# Patient Record
Sex: FEMALE | Race: WHITE | NOT HISPANIC OR LATINO | Employment: OTHER | URBAN - METROPOLITAN AREA
[De-identification: names, ages, dates, MRNs, and addresses within clinical notes are randomized per-mention and may not be internally consistent; named-entity substitution may affect disease eponyms.]

---

## 2018-02-19 ENCOUNTER — OFFICE VISIT (OUTPATIENT)
Dept: PRIMARY CARE CLINIC | Facility: CLINIC | Age: 63
End: 2018-02-19
Payer: MEDICARE

## 2018-02-19 VITALS
HEIGHT: 63 IN | SYSTOLIC BLOOD PRESSURE: 112 MMHG | RESPIRATION RATE: 18 BRPM | BODY MASS INDEX: 26.96 KG/M2 | DIASTOLIC BLOOD PRESSURE: 76 MMHG | HEART RATE: 71 BPM | TEMPERATURE: 99 F | OXYGEN SATURATION: 95 % | WEIGHT: 152.19 LBS

## 2018-02-19 DIAGNOSIS — R10.32 ABDOMINAL PAIN, LLQ (LEFT LOWER QUADRANT): Primary | ICD-10-CM

## 2018-02-19 DIAGNOSIS — M06.9 RHEUMATOID ARTHRITIS, INVOLVING UNSPECIFIED SITE, UNSPECIFIED RHEUMATOID FACTOR PRESENCE: ICD-10-CM

## 2018-02-19 DIAGNOSIS — F41.9 ANXIETY: ICD-10-CM

## 2018-02-19 DIAGNOSIS — I50.9 CONGESTIVE HEART FAILURE, UNSPECIFIED CONGESTIVE HEART FAILURE CHRONICITY, UNSPECIFIED CONGESTIVE HEART FAILURE TYPE: ICD-10-CM

## 2018-02-19 DIAGNOSIS — Z95.0 PACEMAKER: ICD-10-CM

## 2018-02-19 DIAGNOSIS — Z12.31 ENCOUNTER FOR SCREENING MAMMOGRAM FOR BREAST CANCER: ICD-10-CM

## 2018-02-19 PROCEDURE — 99213 OFFICE O/P EST LOW 20 MIN: CPT | Mod: S$PBB,,, | Performed by: INTERNAL MEDICINE

## 2018-02-19 PROCEDURE — 99999 PR PBB SHADOW E&M-NEW PATIENT-LVL III: CPT | Mod: PBBFAC,,, | Performed by: INTERNAL MEDICINE

## 2018-02-19 PROCEDURE — 99203 OFFICE O/P NEW LOW 30 MIN: CPT | Mod: PBBFAC,PN | Performed by: INTERNAL MEDICINE

## 2018-02-19 RX ORDER — CARVEDILOL 12.5 MG/1
12.5 TABLET ORAL 2 TIMES DAILY
Qty: 180 TABLET | Refills: 3 | Status: SHIPPED | OUTPATIENT
Start: 2018-02-19 | End: 2018-09-07 | Stop reason: SDUPTHER

## 2018-02-19 RX ORDER — ALPRAZOLAM 0.5 MG/1
0.5 TABLET ORAL NIGHTLY PRN
COMMUNITY
Start: 2017-12-11 | End: 2018-02-19 | Stop reason: SDUPTHER

## 2018-02-19 RX ORDER — CARVEDILOL 12.5 MG/1
12.5 TABLET ORAL 2 TIMES DAILY
COMMUNITY
Start: 2017-12-11 | End: 2018-02-19 | Stop reason: SDUPTHER

## 2018-02-19 RX ORDER — CEFIXIME 400 MG/1
400 CAPSULE ORAL 2 TIMES DAILY
Qty: 20 CAPSULE | Refills: 1 | Status: SHIPPED | OUTPATIENT
Start: 2018-02-19 | End: 2018-02-20

## 2018-02-19 RX ORDER — CITALOPRAM 20 MG/1
20 TABLET, FILM COATED ORAL DAILY
Qty: 90 TABLET | Refills: 3 | Status: SHIPPED | OUTPATIENT
Start: 2018-02-19 | End: 2018-09-07 | Stop reason: SDUPTHER

## 2018-02-19 RX ORDER — METRONIDAZOLE 500 MG/1
500 TABLET ORAL EVERY 8 HOURS
Qty: 30 TABLET | Refills: 1 | Status: SHIPPED | OUTPATIENT
Start: 2018-02-19 | End: 2018-03-01

## 2018-02-19 RX ORDER — CITALOPRAM 20 MG/1
20 TABLET, FILM COATED ORAL DAILY
COMMUNITY
Start: 2017-12-11 | End: 2018-02-19 | Stop reason: SDUPTHER

## 2018-02-19 RX ORDER — CARVEDILOL 12.5 MG/1
12.5 TABLET ORAL 2 TIMES DAILY
Qty: 180 TABLET | Refills: 3 | Status: SHIPPED | OUTPATIENT
Start: 2018-02-19 | End: 2018-02-19 | Stop reason: SDUPTHER

## 2018-02-19 RX ORDER — ALPRAZOLAM 0.5 MG/1
0.5 TABLET ORAL NIGHTLY PRN
Qty: 30 TABLET | Refills: 0 | Status: SHIPPED | OUTPATIENT
Start: 2018-02-19 | End: 2018-04-11 | Stop reason: SDUPTHER

## 2018-02-20 RX ORDER — CIPROFLOXACIN 500 MG/1
500 TABLET ORAL 2 TIMES DAILY
Qty: 20 TABLET | Refills: 0 | Status: SHIPPED | OUTPATIENT
Start: 2018-02-20 | End: 2018-04-11

## 2018-02-20 NOTE — PROGRESS NOTES
Subjective:       Patient ID: Odette Hart is a 62 y.o. female.    Chief Complaint: Diverticulitis and Routine Check Up    HPI  Pt with h/o diverticuloses by colonscopy c/o LLQ pain few days no fever chill no n/v/d no sob cp  No blo din stool pain not better pt had MI 2012 had stnets need f/u with cardiology has pacemaker pt refuse sReview of Systems    Objective:      Physical Exam   Constitutional: She is oriented to person, place, and time. She appears well-developed and well-nourished. No distress.   HENT:   Head: Normocephalic and atraumatic.   Right Ear: External ear normal.   Left Ear: External ear normal.   Nose: Nose normal.   Mouth/Throat: Oropharynx is clear and moist. No oropharyngeal exudate.   Eyes: Conjunctivae and EOM are normal. Pupils are equal, round, and reactive to light. Right eye exhibits no discharge. Left eye exhibits no discharge.   Neck: Normal range of motion. Neck supple. No thyromegaly present.   Cardiovascular: Normal rate, regular rhythm, normal heart sounds and intact distal pulses.  Exam reveals no gallop and no friction rub.    No murmur heard.  Pulmonary/Chest: Effort normal and breath sounds normal. No respiratory distress. She has no wheezes. She has no rales. She exhibits no tenderness.   Abdominal: Soft. Bowel sounds are normal. She exhibits no distension. There is tenderness (tenderness LLQ with plapation no guarding rebounced). There is no rebound and no guarding.   Musculoskeletal: Normal range of motion. She exhibits no edema, tenderness or deformity.   Lymphadenopathy:     She has no cervical adenopathy.   Neurological: She is alert and oriented to person, place, and time.   Skin: Skin is warm and dry. Capillary refill takes less than 2 seconds. No rash noted. No erythema.   Psychiatric: She has a normal mood and affect. Judgment and thought content normal.   Nursing note and vitals reviewed.      Assessment:       1. Abdominal pain, LLQ (left lower quadrant)    2.  Congestive heart failure, unspecified congestive heart failure chronicity, unspecified congestive heart failure type    3. Pacemaker    4. Anxiety    5. Rheumatoid arthritis, involving unspecified site, unspecified rheumatoid factor presence    6. Encounter for screening mammogram for breast cancer        Plan:       Abdominal pain, LLQ (left lower quadrant)  Comments:  CT scan abd ppelvic if not better in 2 days  Orders:  -     metroNIDAZOLE (FLAGYL) 500 MG tablet; Take 1 tablet (500 mg total) by mouth every 8 (eight) hours.  Dispense: 30 tablet; Refill: 1  -     cefixime 400 mg Cap; Take 400 mg by mouth 2 (two) times daily.  Dispense: 20 capsule; Refill: 1  -     CBC auto differential; Future; Expected date: 02/20/2018  -     Comprehensive metabolic panel; Future; Expected date: 02/20/2018  -     POCT URINE DIPSTICK WITHOUT MICROSCOPE; Future; Expected date: 02/20/2018    Congestive heart failure, unspecified congestive heart failure chronicity, unspecified congestive heart failure type  Comments:  stress echo does not want any IV meds for stress test  Orders:  -     Discontinue: carvedilol (COREG) 12.5 MG tablet; Take 1 tablet (12.5 mg total) by mouth 2 (two) times daily.  Dispense: 180 tablet; Refill: 3  -     carvedilol (COREG) 12.5 MG tablet; Take 1 tablet (12.5 mg total) by mouth 2 (two) times daily.  Dispense: 180 tablet; Refill: 3  -     Lipid panel; Future; Expected date: 02/20/2018    Pacemaker  -     X-Ray Chest PA And Lateral; Future; Expected date: 02/20/2018  -     POCT EKG 12-LEAD (NOT FOR [facility] USE); Future; Expected date: 02/20/2018    Anxiety  -     citalopram (CELEXA) 20 MG tablet; Take 1 tablet (20 mg total) by mouth once daily.  Dispense: 90 tablet; Refill: 3  -     ALPRAZolam (XANAX) 0.5 MG tablet; Take 1 tablet (0.5 mg total) by mouth nightly as needed.  Dispense: 30 tablet; Refill: 0    Rheumatoid arthritis, involving unspecified site, unspecified rheumatoid factor presence  -      Rheumatoid factor; Future; Expected date: 02/21/2018    Encounter for screening mammogram for breast cancer  Comments:  pt currently refuse mammogram

## 2018-02-21 ENCOUNTER — CLINICAL SUPPORT (OUTPATIENT)
Dept: PRIMARY CARE CLINIC | Facility: CLINIC | Age: 63
End: 2018-02-21
Payer: MEDICARE

## 2018-02-21 DIAGNOSIS — E53.8 B12 DEFICIENCY: ICD-10-CM

## 2018-02-21 DIAGNOSIS — M06.9 RHEUMATOID ARTHRITIS, INVOLVING UNSPECIFIED SITE, UNSPECIFIED RHEUMATOID FACTOR PRESENCE: ICD-10-CM

## 2018-02-21 DIAGNOSIS — E55.9 VITAMIN D DEFICIENCY: Primary | ICD-10-CM

## 2018-02-21 DIAGNOSIS — R10.32 ABDOMINAL PAIN, LLQ (LEFT LOWER QUADRANT): ICD-10-CM

## 2018-02-21 DIAGNOSIS — I50.9 CONGESTIVE HEART FAILURE, UNSPECIFIED CONGESTIVE HEART FAILURE CHRONICITY, UNSPECIFIED CONGESTIVE HEART FAILURE TYPE: ICD-10-CM

## 2018-02-21 LAB
25(OH)D3+25(OH)D2 SERPL-MCNC: 31 NG/ML
ALBUMIN SERPL BCP-MCNC: 3.2 G/DL
ALP SERPL-CCNC: 88 U/L
ALT SERPL W/O P-5'-P-CCNC: 19 U/L
ANION GAP SERPL CALC-SCNC: 8 MMOL/L
AST SERPL-CCNC: 18 U/L
BASOPHILS # BLD AUTO: 0.02 K/UL
BASOPHILS NFR BLD: 0.2 %
BILIRUB SERPL-MCNC: 0.9 MG/DL
BILIRUB SERPL-MCNC: ABNORMAL MG/DL
BLOOD URINE, POC: 50
BUN SERPL-MCNC: 13 MG/DL
CALCIUM SERPL-MCNC: 9.6 MG/DL
CHLORIDE SERPL-SCNC: 103 MMOL/L
CHOLEST SERPL-MCNC: 218 MG/DL
CHOLEST/HDLC SERPL: 4.6 {RATIO}
CO2 SERPL-SCNC: 29 MMOL/L
COLOR, POC UA: ABNORMAL
CREAT SERPL-MCNC: 0.8 MG/DL
DIFFERENTIAL METHOD: NORMAL
EOSINOPHIL # BLD AUTO: 0.1 K/UL
EOSINOPHIL NFR BLD: 1 %
ERYTHROCYTE [DISTWIDTH] IN BLOOD BY AUTOMATED COUNT: 12.6 %
EST. GFR  (AFRICAN AMERICAN): >60 ML/MIN/1.73 M^2
EST. GFR  (NON AFRICAN AMERICAN): >60 ML/MIN/1.73 M^2
GLUCOSE SERPL-MCNC: 100 MG/DL
GLUCOSE UR QL STRIP: NORMAL
HCT VFR BLD AUTO: 40.8 %
HDLC SERPL-MCNC: 47 MG/DL
HDLC SERPL: 21.6 %
HGB BLD-MCNC: 14 G/DL
IMM GRANULOCYTES # BLD AUTO: 0.02 K/UL
IMM GRANULOCYTES NFR BLD AUTO: 0.2 %
KETONES UR QL STRIP: ABNORMAL
LDLC SERPL CALC-MCNC: 154.4 MG/DL
LEUKOCYTE ESTERASE URINE, POC: ABNORMAL
LYMPHOCYTES # BLD AUTO: 1.9 K/UL
LYMPHOCYTES NFR BLD: 23.4 %
MCH RBC QN AUTO: 30.2 PG
MCHC RBC AUTO-ENTMCNC: 34.3 G/DL
MCV RBC AUTO: 88 FL
MONOCYTES # BLD AUTO: 0.8 K/UL
MONOCYTES NFR BLD: 10.2 %
NEUTROPHILS # BLD AUTO: 5.3 K/UL
NEUTROPHILS NFR BLD: 65 %
NITRITE, POC UA: ABNORMAL
NONHDLC SERPL-MCNC: 171 MG/DL
NRBC BLD-RTO: 0 /100 WBC
PH, POC UA: 6
PLATELET # BLD AUTO: 284 K/UL
PMV BLD AUTO: 11.2 FL
POTASSIUM SERPL-SCNC: 4.4 MMOL/L
PROT SERPL-MCNC: 7.1 G/DL
PROTEIN, POC: ABNORMAL
RBC # BLD AUTO: 4.64 M/UL
RHEUMATOID FACT SERPL-ACNC: 519 IU/ML
SODIUM SERPL-SCNC: 140 MMOL/L
SPECIFIC GRAVITY, POC UA: 1.01
TRIGL SERPL-MCNC: 83 MG/DL
UROBILINOGEN, POC UA: NORMAL
VIT B12 SERPL-MCNC: 1456 PG/ML
WBC # BLD AUTO: 8.16 K/UL

## 2018-02-21 PROCEDURE — 80061 LIPID PANEL: CPT

## 2018-02-21 PROCEDURE — 80053 COMPREHEN METABOLIC PANEL: CPT

## 2018-02-21 PROCEDURE — 86431 RHEUMATOID FACTOR QUANT: CPT

## 2018-02-21 PROCEDURE — 81002 URINALYSIS NONAUTO W/O SCOPE: CPT | Mod: PBBFAC,PN

## 2018-02-21 PROCEDURE — 99999 PR PBB SHADOW E&M-EST. PATIENT-LVL II: CPT | Mod: PBBFAC,,,

## 2018-02-21 PROCEDURE — 85025 COMPLETE CBC W/AUTO DIFF WBC: CPT

## 2018-02-21 PROCEDURE — 99212 OFFICE O/P EST SF 10 MIN: CPT | Mod: PBBFAC,PN

## 2018-02-21 PROCEDURE — 82607 VITAMIN B-12: CPT

## 2018-02-21 PROCEDURE — 82306 VITAMIN D 25 HYDROXY: CPT

## 2018-02-27 NOTE — TELEPHONE ENCOUNTER
----- Message from Kacy Mccollum sent at 2/26/2018  3:04 PM CST -----  Contact: pt  Pt calling states would like something called in please for nausea cause of medication metroNIDAZOLE (FLAGYL) 500 MG tablet..[phone] (please notify when sent)               .  [address] [place]  [address]  [address] [facility]  [place] [address]  Phone: [phone] Fax: [phone]

## 2018-03-01 RX ORDER — ONDANSETRON 8 MG/1
8 TABLET, ORALLY DISINTEGRATING ORAL EVERY 12 HOURS PRN
Qty: 10 TABLET | Refills: 0 | Status: SHIPPED | OUTPATIENT
Start: 2018-03-01 | End: 2018-09-07 | Stop reason: SDUPTHER

## 2018-03-02 ENCOUNTER — TELEPHONE (OUTPATIENT)
Dept: PRIMARY CARE CLINIC | Facility: CLINIC | Age: 63
End: 2018-03-02

## 2018-03-02 NOTE — TELEPHONE ENCOUNTER
----- Message from Lana Chapin sent at 3/2/2018  2:28 PM CST -----  Returning your call.  Please call patient at [phone]

## 2018-04-11 ENCOUNTER — OFFICE VISIT (OUTPATIENT)
Dept: PRIMARY CARE CLINIC | Facility: CLINIC | Age: 63
End: 2018-04-11
Payer: MEDICARE

## 2018-04-11 ENCOUNTER — TELEPHONE (OUTPATIENT)
Dept: PRIMARY CARE CLINIC | Facility: CLINIC | Age: 63
End: 2018-04-11

## 2018-04-11 VITALS
RESPIRATION RATE: 18 BRPM | WEIGHT: 149.19 LBS | DIASTOLIC BLOOD PRESSURE: 74 MMHG | OXYGEN SATURATION: 97 % | SYSTOLIC BLOOD PRESSURE: 138 MMHG | TEMPERATURE: 98 F | HEIGHT: 63 IN | BODY MASS INDEX: 26.43 KG/M2 | HEART RATE: 67 BPM

## 2018-04-11 DIAGNOSIS — F41.9 ANXIETY: ICD-10-CM

## 2018-04-11 DIAGNOSIS — J32.9 SINUSITIS, UNSPECIFIED CHRONICITY, UNSPECIFIED LOCATION: Primary | ICD-10-CM

## 2018-04-11 DIAGNOSIS — R07.81 PLEURITIC CHEST PAIN: ICD-10-CM

## 2018-04-11 DIAGNOSIS — J40 BRONCHITIS: ICD-10-CM

## 2018-04-11 PROCEDURE — 96372 THER/PROPH/DIAG INJ SC/IM: CPT | Mod: PBBFAC,PN

## 2018-04-11 PROCEDURE — 99999 PR PBB SHADOW E&M-EST. PATIENT-LVL IV: CPT | Mod: PBBFAC,,, | Performed by: INTERNAL MEDICINE

## 2018-04-11 PROCEDURE — 99213 OFFICE O/P EST LOW 20 MIN: CPT | Mod: S$PBB,,, | Performed by: INTERNAL MEDICINE

## 2018-04-11 PROCEDURE — 99214 OFFICE O/P EST MOD 30 MIN: CPT | Mod: PBBFAC,PN,25 | Performed by: INTERNAL MEDICINE

## 2018-04-11 RX ORDER — BETAMETHASONE SODIUM PHOSPHATE AND BETAMETHASONE ACETATE 3; 3 MG/ML; MG/ML
12 INJECTION, SUSPENSION INTRA-ARTICULAR; INTRALESIONAL; INTRAMUSCULAR; SOFT TISSUE
Status: COMPLETED | OUTPATIENT
Start: 2018-04-11 | End: 2018-04-11

## 2018-04-11 RX ORDER — ALBUTEROL SULFATE 90 UG/1
2 AEROSOL, METERED RESPIRATORY (INHALATION) EVERY 6 HOURS PRN
Qty: 18 G | Refills: 1 | Status: SHIPPED | OUTPATIENT
Start: 2018-04-11 | End: 2018-09-07 | Stop reason: SDUPTHER

## 2018-04-11 RX ORDER — PREDNISONE 20 MG/1
20 TABLET ORAL 2 TIMES DAILY
Qty: 14 TABLET | Refills: 0 | Status: SHIPPED | OUTPATIENT
Start: 2018-04-11 | End: 2018-04-18

## 2018-04-11 RX ORDER — CIPROFLOXACIN 500 MG/1
500 TABLET ORAL 2 TIMES DAILY
Qty: 20 TABLET | Refills: 0 | Status: SHIPPED | OUTPATIENT
Start: 2018-04-11 | End: 2018-05-24 | Stop reason: SDUPTHER

## 2018-04-11 RX ORDER — CODEINE PHOSPHATE AND GUAIFENESIN 10; 100 MG/5ML; MG/5ML
5 SOLUTION ORAL 3 TIMES DAILY PRN
Qty: 150 ML | Refills: 0 | Status: SHIPPED | OUTPATIENT
Start: 2018-04-11 | End: 2018-04-21

## 2018-04-11 RX ORDER — ALPRAZOLAM 0.5 MG/1
0.5 TABLET ORAL NIGHTLY PRN
Qty: 30 TABLET | Refills: 0 | Status: SHIPPED | OUTPATIENT
Start: 2018-04-11 | End: 2018-09-07 | Stop reason: SDUPTHER

## 2018-04-11 RX ORDER — LINCOMYCIN HYDROCHLORIDE 300 MG/ML
600 INJECTION, SOLUTION INTRAMUSCULAR; INTRAVENOUS; SUBCONJUNCTIVAL
Status: COMPLETED | OUTPATIENT
Start: 2018-04-11 | End: 2018-04-11

## 2018-04-11 RX ADMIN — LINCOMYCIN HYDROCHLORIDE 600 MG: 300 INJECTION, SOLUTION INTRAMUSCULAR; INTRAVENOUS; SUBCONJUNCTIVAL at 02:04

## 2018-04-11 RX ADMIN — BETAMETHASONE SODIUM PHOSPHATE AND BETAMETHASONE ACETATE 12 MG: 3; 3 INJECTION, SUSPENSION INTRA-ARTICULAR; INTRALESIONAL; INTRAMUSCULAR at 02:04

## 2018-04-11 NOTE — PROGRESS NOTES
Pt ID verified by  and Name. Allergies verified. Celestone 12mg to R VG and Lincocin 600mg to L VG administered per MD order. No adverse reactions noted. Pt tolerated well.

## 2018-04-11 NOTE — TELEPHONE ENCOUNTER
----- Message from Elsa Casper sent at 4/11/2018  4:48 PM CDT -----  Contact: self   Patient has a question regarding her medication, please call back at [phone] (home)

## 2018-04-11 NOTE — TELEPHONE ENCOUNTER
Spoke with patient, wanted to make sure if she needs a refill on cipro we can send. Notified yes, to call and let me know and I will run it by Dr. Barragan to get it sent to the pharmacy

## 2018-04-12 NOTE — PROGRESS NOTES
Subjective:       Patient ID: Odette Hart is a 62 y.o. female.    Chief Complaint: Bronchitis and Cough    HPI  Ptc/o coughing congestion x 1 week not better mucinex not helpingmidchest hurt when cough no sob cp no n/v/d  Review of Systems    Objective:      Physical Exam   Constitutional: She is oriented to person, place, and time. She appears well-developed and well-nourished. No distress.   HENT:   Head: Normocephalic and atraumatic.   Right Ear: External ear normal.   Left Ear: External ear normal.   Mouth/Throat: Oropharynx is clear and moist. No oropharyngeal exudate.   Nasal congestion   Eyes: Conjunctivae and EOM are normal. Pupils are equal, round, and reactive to light. Right eye exhibits no discharge. Left eye exhibits no discharge.   Neck: Normal range of motion. Neck supple. No thyromegaly present.   Cardiovascular: Normal rate, regular rhythm, normal heart sounds and intact distal pulses.  Exam reveals no gallop and no friction rub.    No murmur heard.  Pulmonary/Chest: Effort normal. No respiratory distress. She has no wheezes. She has rales (mild rhonchi). She exhibits no tenderness.   Abdominal: Soft. Bowel sounds are normal. She exhibits no distension. There is no tenderness. There is no rebound and no guarding.   Musculoskeletal: Normal range of motion. She exhibits no edema, tenderness or deformity.   Lymphadenopathy:     She has no cervical adenopathy.   Neurological: She is alert and oriented to person, place, and time.   Skin: Skin is warm and dry. Capillary refill takes less than 2 seconds. No rash noted. No erythema.   Psychiatric: She has a normal mood and affect. Judgment and thought content normal.   Nursing note and vitals reviewed.      Assessment:       1. Sinusitis, unspecified chronicity, unspecified location    2. Anxiety    3. Bronchitis    4. Pleuritic chest pain        Plan:       Sinusitis, unspecified chronicity, unspecified location  -     ciprofloxacin HCl (CIPRO) 500  MG tablet; Take 1 tablet (500 mg total) by mouth 2 (two) times daily.  Dispense: 20 tablet; Refill: 0    Anxiety  -     ALPRAZolam (XANAX) 0.5 MG tablet; Take 1 tablet (0.5 mg total) by mouth nightly as needed.  Dispense: 30 tablet; Refill: 0    Bronchitis  -     X-Ray Chest PA And Lateral  -     guaifenesin-codeine 100-10 mg/5 ml (TUSSI-ORGANIDIN NR)  mg/5 mL syrup; Take 5 mLs by mouth 3 (three) times daily as needed.  Dispense: 150 mL; Refill: 0  -     predniSONE (DELTASONE) 20 MG tablet; Take 1 tablet (20 mg total) by mouth 2 (two) times daily.  Dispense: 14 tablet; Refill: 0  -     albuterol 90 mcg/actuation inhaler; Inhale 2 puffs into the lungs every 6 (six) hours as needed for Wheezing. Rescue  Dispense: 18 g; Refill: 1  -     betamethasone acetate-betamethasone sodium phosphate injection 12 mg; Inject 2 mLs (12 mg total) into the muscle one time.    Pleuritic chest pain  -     lincomycin injection 600 mg; Inject 2 mLs (600 mg total) into the muscle one time.

## 2018-05-22 DIAGNOSIS — F41.9 ANXIETY: ICD-10-CM

## 2018-05-22 RX ORDER — ALPRAZOLAM 0.5 MG/1
TABLET ORAL
Qty: 30 TABLET | Refills: 0 | OUTPATIENT
Start: 2018-05-22

## 2018-05-24 DIAGNOSIS — J32.9 SINUSITIS, UNSPECIFIED CHRONICITY, UNSPECIFIED LOCATION: ICD-10-CM

## 2018-05-25 RX ORDER — CIPROFLOXACIN 500 MG/1
TABLET ORAL
Qty: 20 TABLET | Refills: 0 | Status: SHIPPED | OUTPATIENT
Start: 2018-05-25 | End: 2018-09-07

## 2018-09-07 ENCOUNTER — OFFICE VISIT (OUTPATIENT)
Dept: PRIMARY CARE CLINIC | Facility: CLINIC | Age: 63
End: 2018-09-07
Payer: MEDICARE

## 2018-09-07 ENCOUNTER — CLINICAL SUPPORT (OUTPATIENT)
Dept: PRIMARY CARE CLINIC | Facility: CLINIC | Age: 63
End: 2018-09-07
Payer: MEDICARE

## 2018-09-07 VITALS
HEIGHT: 63 IN | WEIGHT: 151 LBS | DIASTOLIC BLOOD PRESSURE: 74 MMHG | SYSTOLIC BLOOD PRESSURE: 123 MMHG | TEMPERATURE: 99 F | OXYGEN SATURATION: 95 % | HEART RATE: 87 BPM | BODY MASS INDEX: 26.75 KG/M2 | RESPIRATION RATE: 18 BRPM

## 2018-09-07 DIAGNOSIS — K57.92 DIVERTICULITIS: Primary | ICD-10-CM

## 2018-09-07 DIAGNOSIS — K57.92 DIVERTICULITIS: ICD-10-CM

## 2018-09-07 DIAGNOSIS — J40 BRONCHITIS: ICD-10-CM

## 2018-09-07 DIAGNOSIS — I50.9 CONGESTIVE HEART FAILURE: ICD-10-CM

## 2018-09-07 DIAGNOSIS — R10.32 ABDOMINAL PAIN, LLQ (LEFT LOWER QUADRANT): ICD-10-CM

## 2018-09-07 DIAGNOSIS — F41.9 ANXIETY: ICD-10-CM

## 2018-09-07 LAB
ALBUMIN SERPL BCP-MCNC: 3.6 G/DL
ALP SERPL-CCNC: 74 U/L
ALT SERPL W/O P-5'-P-CCNC: 20 U/L
ANION GAP SERPL CALC-SCNC: 8 MMOL/L
AST SERPL-CCNC: 25 U/L
BASOPHILS # BLD AUTO: 0 K/UL
BASOPHILS NFR BLD: 0.4 %
BILIRUB SERPL-MCNC: 0.6 MG/DL
BUN SERPL-MCNC: 14 MG/DL
CALCIUM SERPL-MCNC: 9.2 MG/DL
CHLORIDE SERPL-SCNC: 101 MMOL/L
CO2 SERPL-SCNC: 27 MMOL/L
CREAT SERPL-MCNC: 0.8 MG/DL
DIFFERENTIAL METHOD: NORMAL
EOSINOPHIL # BLD AUTO: 0 K/UL
EOSINOPHIL NFR BLD: 0.5 %
ERYTHROCYTE [DISTWIDTH] IN BLOOD BY AUTOMATED COUNT: 13 %
ERYTHROCYTE [SEDIMENTATION RATE] IN BLOOD BY WESTERGREN METHOD: 63 MM/HR
EST. GFR  (AFRICAN AMERICAN): >60 ML/MIN/1.73 M^2
EST. GFR  (NON AFRICAN AMERICAN): >60 ML/MIN/1.73 M^2
GLUCOSE SERPL-MCNC: 158 MG/DL
HCT VFR BLD AUTO: 42.6 %
HGB BLD-MCNC: 14.6 G/DL
LYMPHOCYTES # BLD AUTO: 2.6 K/UL
LYMPHOCYTES NFR BLD: 23.6 %
MCH RBC QN AUTO: 30 PG
MCHC RBC AUTO-ENTMCNC: 34.3 G/DL
MCV RBC AUTO: 88 FL
MONOCYTES # BLD AUTO: 0.9 K/UL
MONOCYTES NFR BLD: 8.1 %
NEUTROPHILS # BLD AUTO: 7.3 K/UL
NEUTROPHILS NFR BLD: 67.4 %
PLATELET # BLD AUTO: 260 K/UL
PMV BLD AUTO: 9.7 FL
POTASSIUM SERPL-SCNC: 3.7 MMOL/L
PROT SERPL-MCNC: 7.3 G/DL
RBC # BLD AUTO: 4.87 M/UL
SODIUM SERPL-SCNC: 136 MMOL/L
WBC # BLD AUTO: 10.9 K/UL

## 2018-09-07 PROCEDURE — 99213 OFFICE O/P EST LOW 20 MIN: CPT | Mod: S$PBB,,, | Performed by: INTERNAL MEDICINE

## 2018-09-07 PROCEDURE — 99215 OFFICE O/P EST HI 40 MIN: CPT | Mod: PBBFAC,27,PN,25 | Performed by: INTERNAL MEDICINE

## 2018-09-07 PROCEDURE — 99212 OFFICE O/P EST SF 10 MIN: CPT | Mod: PBBFAC,PN,25

## 2018-09-07 PROCEDURE — 96372 THER/PROPH/DIAG INJ SC/IM: CPT | Mod: PBBFAC,PN

## 2018-09-07 PROCEDURE — 99999 PR PBB SHADOW E&M-EST. PATIENT-LVL II: CPT | Mod: PBBFAC,,,

## 2018-09-07 PROCEDURE — 99999 PR PBB SHADOW E&M-EST. PATIENT-LVL V: CPT | Mod: PBBFAC,,, | Performed by: INTERNAL MEDICINE

## 2018-09-07 RX ORDER — LINCOMYCIN HYDROCHLORIDE 300 MG/ML
600 INJECTION, SOLUTION INTRAMUSCULAR; INTRAVENOUS; SUBCONJUNCTIVAL ONCE
Status: COMPLETED | OUTPATIENT
Start: 2018-09-07 | End: 2018-09-07

## 2018-09-07 RX ORDER — ONDANSETRON 8 MG/1
8 TABLET, ORALLY DISINTEGRATING ORAL EVERY 12 HOURS PRN
Qty: 10 TABLET | Refills: 0 | Status: SHIPPED | OUTPATIENT
Start: 2018-09-07 | End: 2019-01-08

## 2018-09-07 RX ORDER — CIPROFLOXACIN 500 MG/1
500 TABLET ORAL 2 TIMES DAILY
Qty: 20 TABLET | Refills: 1 | Status: SHIPPED | OUTPATIENT
Start: 2018-09-07 | End: 2018-09-18

## 2018-09-07 RX ORDER — METRONIDAZOLE 500 MG/1
500 TABLET ORAL EVERY 8 HOURS
Qty: 30 TABLET | Refills: 1 | Status: SHIPPED | OUTPATIENT
Start: 2018-09-07 | End: 2019-01-08

## 2018-09-07 RX ORDER — ALBUTEROL SULFATE 90 UG/1
2 AEROSOL, METERED RESPIRATORY (INHALATION) EVERY 6 HOURS PRN
Qty: 18 G | Refills: 1 | Status: SHIPPED | OUTPATIENT
Start: 2018-09-07 | End: 2019-01-08 | Stop reason: SDUPTHER

## 2018-09-07 RX ORDER — CITALOPRAM 20 MG/1
20 TABLET, FILM COATED ORAL DAILY
Qty: 90 TABLET | Refills: 3 | Status: SHIPPED | OUTPATIENT
Start: 2018-09-07 | End: 2019-01-08 | Stop reason: SDUPTHER

## 2018-09-07 RX ORDER — CARVEDILOL 12.5 MG/1
12.5 TABLET ORAL 2 TIMES DAILY
Qty: 180 TABLET | Refills: 3 | Status: SHIPPED | OUTPATIENT
Start: 2018-09-07 | End: 2019-01-08 | Stop reason: SDUPTHER

## 2018-09-07 RX ORDER — ALPRAZOLAM 0.5 MG/1
0.5 TABLET ORAL NIGHTLY PRN
Qty: 30 TABLET | Refills: 3 | Status: SHIPPED | OUTPATIENT
Start: 2018-09-07 | End: 2019-01-08 | Stop reason: SDUPTHER

## 2018-09-07 RX ADMIN — LINCOMYCIN HYDROCHLORIDE 600 MG: 300 INJECTION, SOLUTION INTRAMUSCULAR; INTRAVENOUS; SUBCONJUNCTIVAL at 12:09

## 2018-09-13 ENCOUNTER — TELEPHONE (OUTPATIENT)
Dept: PRIMARY CARE CLINIC | Facility: CLINIC | Age: 63
End: 2018-09-13

## 2018-09-13 NOTE — TELEPHONE ENCOUNTER
Patient called wanting her results from her ultrasound. Is it possible you can review it and let me know, patient really wants to know the results.      Please advise.

## 2018-09-13 NOTE — TELEPHONE ENCOUNTER
Spoke with the patient and informed her that she does indeed have diverticulitis.  She has not had pancreatitis as previously discussed.  She reports that she has been started on is compliant with Cipro and Flagyl.  Started antibiotics Saturday.  Has been resting her bowels well with Gatorade, water, soups.  The pain has improved in her left lower quadrant.  Diarrhea continues.  Denies fever.

## 2018-09-18 ENCOUNTER — OFFICE VISIT (OUTPATIENT)
Dept: PRIMARY CARE CLINIC | Facility: CLINIC | Age: 63
End: 2018-09-18
Payer: MEDICARE

## 2018-09-18 VITALS
HEIGHT: 63 IN | OXYGEN SATURATION: 99 % | BODY MASS INDEX: 26.61 KG/M2 | HEART RATE: 78 BPM | SYSTOLIC BLOOD PRESSURE: 122 MMHG | DIASTOLIC BLOOD PRESSURE: 76 MMHG | WEIGHT: 150.19 LBS | RESPIRATION RATE: 18 BRPM | TEMPERATURE: 98 F

## 2018-09-18 DIAGNOSIS — I50.9 CONGESTIVE HEART FAILURE, UNSPECIFIED HF CHRONICITY, UNSPECIFIED HEART FAILURE TYPE: ICD-10-CM

## 2018-09-18 DIAGNOSIS — R73.9 HYPERGLYCEMIA: ICD-10-CM

## 2018-09-18 DIAGNOSIS — Z12.31 ENCOUNTER FOR SCREENING MAMMOGRAM FOR BREAST CANCER: ICD-10-CM

## 2018-09-18 DIAGNOSIS — K57.92 DIVERTICULITIS: Primary | ICD-10-CM

## 2018-09-18 DIAGNOSIS — Z23 NEED FOR IMMUNIZATION AGAINST INFLUENZA: ICD-10-CM

## 2018-09-18 PROCEDURE — 99214 OFFICE O/P EST MOD 30 MIN: CPT | Mod: PBBFAC,PN | Performed by: INTERNAL MEDICINE

## 2018-09-18 PROCEDURE — 99999 PR PBB SHADOW E&M-EST. PATIENT-LVL IV: CPT | Mod: PBBFAC,,, | Performed by: INTERNAL MEDICINE

## 2018-09-18 PROCEDURE — 99213 OFFICE O/P EST LOW 20 MIN: CPT | Mod: S$PBB,,, | Performed by: INTERNAL MEDICINE

## 2018-09-18 RX ORDER — ONDANSETRON HYDROCHLORIDE 8 MG/1
TABLET, FILM COATED ORAL
COMMUNITY
Start: 2018-09-09 | End: 2018-09-18

## 2018-09-19 NOTE — PROGRESS NOTES
Subjective:       Patient ID: Odette Hrat is a 62 y.o. female.    Chief Complaint: Follow-up    HPI  Pt is here for f/u abd pain resolved still have few days of po abx had colonoscopy before no polyps just diverticuloses not and diet   Review of Systems    Objective:      Physical Exam   Constitutional: She is oriented to person, place, and time. She appears well-developed and well-nourished. No distress.   HENT:   Head: Normocephalic and atraumatic.   Right Ear: External ear normal.   Left Ear: External ear normal.   Nose: Nose normal.   Mouth/Throat: Oropharynx is clear and moist. No oropharyngeal exudate.   Eyes: Conjunctivae and EOM are normal. Pupils are equal, round, and reactive to light. Right eye exhibits no discharge. Left eye exhibits no discharge.   Neck: Normal range of motion. Neck supple. No thyromegaly present.   Cardiovascular: Normal rate, regular rhythm, normal heart sounds and intact distal pulses. Exam reveals no gallop and no friction rub.   No murmur heard.  Pulmonary/Chest: Effort normal and breath sounds normal. No respiratory distress. She has no wheezes. She has no rales. She exhibits no tenderness.   Abdominal: Soft. Bowel sounds are normal. She exhibits no distension. There is no tenderness. There is no rebound and no guarding.   Musculoskeletal: Normal range of motion. She exhibits no edema, tenderness or deformity.   Lymphadenopathy:     She has no cervical adenopathy.   Neurological: She is alert and oriented to person, place, and time.   Skin: Skin is warm and dry. Capillary refill takes less than 2 seconds. No rash noted. No erythema.   Psychiatric: She has a normal mood and affect. Judgment and thought content normal.   Nursing note and vitals reviewed.      Assessment:       1. Diverticulitis    2. Need for immunization against influenza    3. Congestive heart failure, unspecified HF chronicity, unspecified heart failure type    4. Encounter for screening mammogram for  breast cancer    5. Hyperglycemia        Plan:       Diverticulitis  Comments:  resolved will nathalia colonoscopy in near future pt aware will let me know when ready    Need for immunization against influenza  Comments:  will get in 1 month    Congestive heart failure, unspecified HF chronicity, unspecified heart failure type  -     CBC auto differential; Future; Expected date: 09/18/2018  -     Comprehensive metabolic panel; Future; Expected date: 09/18/2018  -     Lipid panel; Future; Expected date: 09/18/2018  -     TSH; Future; Expected date: 09/18/2018  -     X-Ray Chest PA And Lateral; Future; Expected date: 09/18/2018  -     Hepatitis C antibody; Future; Expected date: 09/18/2018    Encounter for screening mammogram for breast cancer  -     Mammo Digital Screening Bilat without CA; Future; Expected date: 10/02/2018    Hyperglycemia  Comments:  was not fasting  Orders:  -     Hemoglobin A1c; Future; Expected date: 09/18/2018

## 2019-01-08 ENCOUNTER — OFFICE VISIT (OUTPATIENT)
Dept: PRIMARY CARE CLINIC | Facility: CLINIC | Age: 64
End: 2019-01-08
Payer: MEDICARE

## 2019-01-08 VITALS
OXYGEN SATURATION: 98 % | WEIGHT: 151.19 LBS | RESPIRATION RATE: 18 BRPM | TEMPERATURE: 99 F | DIASTOLIC BLOOD PRESSURE: 84 MMHG | HEIGHT: 63 IN | HEART RATE: 66 BPM | SYSTOLIC BLOOD PRESSURE: 138 MMHG | BODY MASS INDEX: 26.79 KG/M2

## 2019-01-08 DIAGNOSIS — Z12.11 COLON CANCER SCREENING: ICD-10-CM

## 2019-01-08 DIAGNOSIS — Z12.31 ENCOUNTER FOR SCREENING MAMMOGRAM FOR BREAST CANCER: ICD-10-CM

## 2019-01-08 DIAGNOSIS — Z11.59 NEED FOR HEPATITIS C SCREENING TEST: ICD-10-CM

## 2019-01-08 DIAGNOSIS — Z13.6 ENCOUNTER FOR SCREENING FOR CARDIOVASCULAR DISORDERS: ICD-10-CM

## 2019-01-08 DIAGNOSIS — J40 BRONCHITIS: Primary | ICD-10-CM

## 2019-01-08 DIAGNOSIS — I50.30 CONGESTIVE HEART FAILURE WITH LEFT VENTRICULAR DIASTOLIC DYSFUNCTION, NYHA CLASS 2: ICD-10-CM

## 2019-01-08 DIAGNOSIS — F41.9 ANXIETY: ICD-10-CM

## 2019-01-08 DIAGNOSIS — K04.7 ABSCESSED TOOTH: ICD-10-CM

## 2019-01-08 PROCEDURE — 99499 UNLISTED E&M SERVICE: CPT | Mod: S$GLB,,, | Performed by: INTERNAL MEDICINE

## 2019-01-08 PROCEDURE — 96372 PR INJECTION,THERAP/PROPH/DIAG2ST, IM OR SUBCUT: ICD-10-PCS | Mod: S$GLB,,, | Performed by: INTERNAL MEDICINE

## 2019-01-08 PROCEDURE — 99214 OFFICE O/P EST MOD 30 MIN: CPT | Mod: 25,S$GLB,, | Performed by: INTERNAL MEDICINE

## 2019-01-08 PROCEDURE — 99999 PR PBB SHADOW E&M-EST. PATIENT-LVL V: CPT | Mod: PBBFAC,,, | Performed by: INTERNAL MEDICINE

## 2019-01-08 PROCEDURE — 96372 THER/PROPH/DIAG INJ SC/IM: CPT | Mod: S$GLB,,, | Performed by: INTERNAL MEDICINE

## 2019-01-08 PROCEDURE — 3008F PR BODY MASS INDEX (BMI) DOCUMENTED: ICD-10-PCS | Mod: CPTII,S$GLB,, | Performed by: INTERNAL MEDICINE

## 2019-01-08 PROCEDURE — 81002 POCT URINE DIPSTICK WITHOUT MICROSCOPE: ICD-10-PCS | Mod: S$GLB,,, | Performed by: INTERNAL MEDICINE

## 2019-01-08 PROCEDURE — 99499 RISK ADDL DX/OHS AUDIT: ICD-10-PCS | Mod: S$GLB,,, | Performed by: INTERNAL MEDICINE

## 2019-01-08 PROCEDURE — 99214 PR OFFICE/OUTPT VISIT, EST, LEVL IV, 30-39 MIN: ICD-10-PCS | Mod: 25,S$GLB,, | Performed by: INTERNAL MEDICINE

## 2019-01-08 PROCEDURE — 81002 URINALYSIS NONAUTO W/O SCOPE: CPT | Mod: S$GLB,,, | Performed by: INTERNAL MEDICINE

## 2019-01-08 PROCEDURE — 3008F BODY MASS INDEX DOCD: CPT | Mod: CPTII,S$GLB,, | Performed by: INTERNAL MEDICINE

## 2019-01-08 PROCEDURE — 99999 PR PBB SHADOW E&M-EST. PATIENT-LVL V: ICD-10-PCS | Mod: PBBFAC,,, | Performed by: INTERNAL MEDICINE

## 2019-01-08 RX ORDER — CEFTRIAXONE 1 G/1
1 INJECTION, POWDER, FOR SOLUTION INTRAMUSCULAR; INTRAVENOUS
Status: COMPLETED | OUTPATIENT
Start: 2019-01-08 | End: 2019-01-08

## 2019-01-08 RX ORDER — CLINDAMYCIN HYDROCHLORIDE 300 MG/1
300 CAPSULE ORAL EVERY 6 HOURS
Qty: 40 CAPSULE | Refills: 0 | Status: SHIPPED | OUTPATIENT
Start: 2019-01-08 | End: 2019-01-21

## 2019-01-08 RX ORDER — CARVEDILOL 12.5 MG/1
12.5 TABLET ORAL 2 TIMES DAILY
Qty: 180 TABLET | Refills: 3 | Status: SHIPPED | OUTPATIENT
Start: 2019-01-08 | End: 2019-09-09 | Stop reason: SDUPTHER

## 2019-01-08 RX ORDER — ALBUTEROL SULFATE 90 UG/1
2 AEROSOL, METERED RESPIRATORY (INHALATION) EVERY 6 HOURS PRN
Qty: 18 G | Refills: 1 | Status: SHIPPED | OUTPATIENT
Start: 2019-01-08 | End: 2019-09-09 | Stop reason: SDUPTHER

## 2019-01-08 RX ORDER — CITALOPRAM 20 MG/1
20 TABLET, FILM COATED ORAL DAILY
Qty: 90 TABLET | Refills: 3 | Status: SHIPPED | OUTPATIENT
Start: 2019-01-08 | End: 2019-09-09 | Stop reason: SDUPTHER

## 2019-01-08 RX ORDER — ALPRAZOLAM 0.5 MG/1
0.5 TABLET ORAL NIGHTLY PRN
Qty: 30 TABLET | Refills: 3 | Status: SHIPPED | OUTPATIENT
Start: 2019-01-08 | End: 2019-09-09 | Stop reason: SDUPTHER

## 2019-01-08 RX ADMIN — CEFTRIAXONE 1 G: 1 INJECTION, POWDER, FOR SOLUTION INTRAMUSCULAR; INTRAVENOUS at 10:01

## 2019-01-08 NOTE — PROGRESS NOTES
Patient ID verified by name and . Allergies verified. Ceftriaxone 1 gram IM injection given in right ventrogluteal using aseptic technique. Aspirated with no blood noted. Patient tolerated well. Given per physicians order. No adverse reaction noted.

## 2019-01-09 NOTE — PATIENT INSTRUCTIONS
Patient need blood tests stool for occult blood mammogram chest x-ray UA and referral to cardiology Dr. resendez

## 2019-01-09 NOTE — PROGRESS NOTES
Subjective:       Patient ID: Odette Hart is a 63 y.o. female.    Chief Complaint: Sinusitis    HPI  patient is here with complaint of sinus congestion coughing greenish phlegm sometime for more than a week no fever chills and no nausea vomiting short of breath or chest pain patient refused the flu vaccine and pneumococcal vaccine she also recently seen by Dr. Cowart cardiologist who ordered a nuclear stress test but patient does not want to have it done she a symptomatic cyst she had history of MI and coronary artery stents in the past she request to be referred to a new cardiologist patient also refused colonoscopy but okay with the stool for occult blood still suffer with chronic anxiety take anxiety medication once in while a prescription last her several months  Review of Systems    Objective:      Physical Exam   Constitutional: She is oriented to person, place, and time. She appears well-developed and well-nourished. No distress.   HENT:   Head: Normocephalic and atraumatic.   Right Ear: External ear normal.   Left Ear: External ear normal.   Nose: Nose normal.   Mouth/Throat: Oropharynx is clear and moist. No oropharyngeal exudate.   Eyes: Conjunctivae and EOM are normal. Pupils are equal, round, and reactive to light. Right eye exhibits no discharge. Left eye exhibits no discharge.   Neck: Normal range of motion. Neck supple. No thyromegaly present.   Cardiovascular: Normal rate, regular rhythm, normal heart sounds and intact distal pulses. Exam reveals no gallop and no friction rub.   No murmur heard.  Pulmonary/Chest: Effort normal and breath sounds normal. No respiratory distress. She has no wheezes. She has no rales. She exhibits no tenderness.   Abdominal: Soft. Bowel sounds are normal. She exhibits no distension. There is no tenderness. There is no rebound and no guarding.   Musculoskeletal: Normal range of motion. She exhibits no edema, tenderness or deformity.   Lymphadenopathy:     She  has no cervical adenopathy.   Neurological: She is alert and oriented to person, place, and time.   Skin: Skin is warm and dry. Capillary refill takes less than 2 seconds. No rash noted. No erythema.   Psychiatric: She has a normal mood and affect. Judgment and thought content normal.   Nursing note and vitals reviewed.      Assessment:       1. Congestive heart failure, unspecified HF chronicity, unspecified heart failure type    2. Bronchitis    3. Anxiety    4. Congestive heart failure    5. Abscessed tooth    6. Colon cancer screening    7. Encounter for screening for cardiovascular disorders    8. Need for hepatitis C screening test    9. Encounter for screening mammogram for breast cancer        Plan:       Congestive heart failure, unspecified HF chronicity, unspecified heart failure type  -     Ambulatory consult to Cardiology    Bronchitis  -     albuterol (PROVENTIL/VENTOLIN HFA) 90 mcg/actuation inhaler; Inhale 2 puffs into the lungs every 6 (six) hours as needed for Wheezing. Rescue  Dispense: 18 g; Refill: 1  -     X-Ray Chest PA And Lateral; Future; Expected date: 01/09/2019    Anxiety  -     ALPRAZolam (XANAX) 0.5 MG tablet; Take 1 tablet (0.5 mg total) by mouth nightly as needed.  Dispense: 30 tablet; Refill: 3  -     citalopram (CELEXA) 20 MG tablet; Take 1 tablet (20 mg total) by mouth once daily.  Dispense: 90 tablet; Refill: 3    Congestive heart failure  -     carvedilol (COREG) 12.5 MG tablet; Take 1 tablet (12.5 mg total) by mouth 2 (two) times daily.  Dispense: 180 tablet; Refill: 3  -     Ambulatory consult to Cardiology  -     CBC auto differential; Future; Expected date: 01/09/2019  -     Comprehensive metabolic panel; Future; Expected date: 01/09/2019  -     POCT URINE DIPSTICK WITHOUT MICROSCOPE  -     SCHEDULED EKG 12-LEAD (to [facility]); Future  -     Ambulatory consult to Cardiology    Abscessed tooth  -     cefTRIAXone injection 1 g  -     clindamycin (CLEOCIN) 300 MG capsule; Take 1  capsule (300 mg total) by mouth every 6 (six) hours.  Dispense: 40 capsule; Refill: 0    Colon cancer screening  -     Fecal Immunochemical Test (iFOBT); Future; Expected date: 01/08/2019    Encounter for screening for cardiovascular disorders  -     Lipid panel; Future; Expected date: 01/09/2019    Need for hepatitis C screening test  -     Hepatitis C antibody; Future; Expected date: 01/09/2019    Encounter for screening mammogram for breast cancer  -     Mammo Digital Screening Bilat without CA; Future; Expected date: 01/23/2019

## 2019-01-14 ENCOUNTER — TELEPHONE (OUTPATIENT)
Dept: PRIMARY CARE CLINIC | Facility: CLINIC | Age: 64
End: 2019-01-14

## 2019-01-14 NOTE — TELEPHONE ENCOUNTER
----- Message from Harini Tubbs sent at 1/14/2019  3:18 PM CST -----  Contact: patient  Type: Needs Medical Advice    Who Called:  Patient  Symptoms (please be specific):    How long has patient had these symptoms:    Pharmacy name and phone #:    Best Call Back Number: [phone]  Additional Information: requesting a call back have questions regarding order for labs

## 2019-01-16 ENCOUNTER — TELEPHONE (OUTPATIENT)
Dept: PRIMARY CARE CLINIC | Facility: CLINIC | Age: 64
End: 2019-01-16

## 2019-01-16 NOTE — TELEPHONE ENCOUNTER
----- Message from Pamela Kaiser sent at 1/16/2019 12:35 PM CST -----  Contact: Patient  Type: Needs Medical Advice    Who Called:  Odette patient  Symptoms (please be specific):  Chest discomfort  How long has patient had these symptoms:  A couple of days  Pharmacy name and phone #:    [facility] [id number]  [place], [place] - [address]  [address]  [facility] [address]  Phone: [phone] Fax: [phone]  Best Call Back Number: [phone]  Additional Information: Calling because she has been having chest discomfort, has tried using Patti Salsa, Prilosec and nothing seems to be helping. Please call her. Thanks.

## 2019-01-16 NOTE — TELEPHONE ENCOUNTER
Spoke with patient states she has had a heart attack in the past and now she is having chest discomfort for a couple of days but doesn't know if it is heart burn or not. Advised her we do not have any appointments for today. States she is going to the ER just to make sure she nothing is going on with her heart.

## 2019-01-17 NOTE — TELEPHONE ENCOUNTER
----- Message from Pamela Kaiser sent at 1/17/2019 12:29 PM CST -----  Contact: Patient  Type: Needs Medical Advice    Who Called:  Odette patient  Symptoms (please be specific):  Acid reflux  How long has patient had these symptoms:  A couple of days  Pharmacy name and phone #:    [facility] [id number]  [place], [place] - [address]  [address]  [place] [address]  Phone: [phone] Fax: [phone]  Best Call Back Number: [phone]  Additional Information: Calling because she went to the emergency room and they told her she had acid reflux and gave her a GI cocktail which made her fell better. They also prescribed Prilosec which is not helping. What help can she take. Please call her. Thanks.

## 2019-01-21 LAB
BILIRUB SERPL-MCNC: NORMAL MG/DL
BLOOD URINE, POC: NORMAL
COLOR, POC UA: YELLOW
GLUCOSE UR QL STRIP: NORMAL
KETONES UR QL STRIP: NORMAL
LEUKOCYTE ESTERASE URINE, POC: NORMAL
NITRITE, POC UA: NORMAL
PH, POC UA: 7
PROTEIN, POC: NORMAL
SPECIFIC GRAVITY, POC UA: 1.01
UROBILINOGEN, POC UA: NORMAL

## 2019-01-21 RX ORDER — AMOXICILLIN 500 MG/1
500 CAPSULE ORAL EVERY 12 HOURS
Qty: 20 CAPSULE | Refills: 0 | Status: SHIPPED | OUTPATIENT
Start: 2019-01-21 | End: 2019-03-07

## 2019-01-21 NOTE — TELEPHONE ENCOUNTER
Patient stopped by, states that she is unable to take clindamycin due to side effect. However, patient would like amoxil sent into the pharmacy, please advise.

## 2019-02-04 ENCOUNTER — OFFICE VISIT (OUTPATIENT)
Dept: CARDIOLOGY | Facility: CLINIC | Age: 64
End: 2019-02-04
Payer: MEDICARE

## 2019-02-04 VITALS
DIASTOLIC BLOOD PRESSURE: 75 MMHG | BODY MASS INDEX: 26.91 KG/M2 | SYSTOLIC BLOOD PRESSURE: 123 MMHG | WEIGHT: 151.88 LBS | HEART RATE: 60 BPM | HEIGHT: 63 IN

## 2019-02-04 DIAGNOSIS — I10 ESSENTIAL HYPERTENSION: ICD-10-CM

## 2019-02-04 DIAGNOSIS — I25.2 OLD MI (MYOCARDIAL INFARCTION): Primary | ICD-10-CM

## 2019-02-04 DIAGNOSIS — I25.5 ISCHEMIC CARDIOMYOPATHY: ICD-10-CM

## 2019-02-04 DIAGNOSIS — E78.00 PURE HYPERCHOLESTEROLEMIA: ICD-10-CM

## 2019-02-04 DIAGNOSIS — Z98.61 S/P PTCA (PERCUTANEOUS TRANSLUMINAL CORONARY ANGIOPLASTY): ICD-10-CM

## 2019-02-04 DIAGNOSIS — Z95.810 AICD (AUTOMATIC CARDIOVERTER/DEFIBRILLATOR) PRESENT: ICD-10-CM

## 2019-02-04 PROCEDURE — 99999 PR PBB SHADOW E&M-EST. PATIENT-LVL III: CPT | Mod: PBBFAC,,, | Performed by: INTERNAL MEDICINE

## 2019-02-04 PROCEDURE — 3008F BODY MASS INDEX DOCD: CPT | Mod: CPTII,S$GLB,, | Performed by: INTERNAL MEDICINE

## 2019-02-04 PROCEDURE — 3078F PR MOST RECENT DIASTOLIC BLOOD PRESSURE < 80 MM HG: ICD-10-PCS | Mod: CPTII,S$GLB,, | Performed by: INTERNAL MEDICINE

## 2019-02-04 PROCEDURE — 3008F PR BODY MASS INDEX (BMI) DOCUMENTED: ICD-10-PCS | Mod: CPTII,S$GLB,, | Performed by: INTERNAL MEDICINE

## 2019-02-04 PROCEDURE — 99205 PR OFFICE/OUTPT VISIT, NEW, LEVL V, 60-74 MIN: ICD-10-PCS | Mod: S$GLB,,, | Performed by: INTERNAL MEDICINE

## 2019-02-04 PROCEDURE — 99999 PR PBB SHADOW E&M-EST. PATIENT-LVL III: ICD-10-PCS | Mod: PBBFAC,,, | Performed by: INTERNAL MEDICINE

## 2019-02-04 PROCEDURE — 3074F PR MOST RECENT SYSTOLIC BLOOD PRESSURE < 130 MM HG: ICD-10-PCS | Mod: CPTII,S$GLB,, | Performed by: INTERNAL MEDICINE

## 2019-02-04 PROCEDURE — 3074F SYST BP LT 130 MM HG: CPT | Mod: CPTII,S$GLB,, | Performed by: INTERNAL MEDICINE

## 2019-02-04 PROCEDURE — 99499 RISK ADDL DX/OHS AUDIT: ICD-10-PCS | Mod: S$GLB,,, | Performed by: INTERNAL MEDICINE

## 2019-02-04 PROCEDURE — 99499 UNLISTED E&M SERVICE: CPT | Mod: S$GLB,,, | Performed by: INTERNAL MEDICINE

## 2019-02-04 PROCEDURE — 3078F DIAST BP <80 MM HG: CPT | Mod: CPTII,S$GLB,, | Performed by: INTERNAL MEDICINE

## 2019-02-04 PROCEDURE — 99205 OFFICE O/P NEW HI 60 MIN: CPT | Mod: S$GLB,,, | Performed by: INTERNAL MEDICINE

## 2019-02-04 RX ORDER — EZETIMIBE 10 MG/1
10 TABLET ORAL DAILY
Qty: 90 TABLET | Refills: 3 | Status: ON HOLD | OUTPATIENT
Start: 2019-02-04 | End: 2021-01-15 | Stop reason: HOSPADM

## 2019-02-04 NOTE — PROGRESS NOTES
Subjective:      Patient ID: Odette Hart is a 63 y.o. female.    Chief Complaint: Coronary Artery Disease (Ref by Dr Barragan - Use to see Dr Alexsander Pérez)    HPI:  Pt had a heart attack and a PTCA in her LAD in  while living in [place].  Due to LVEF of 20% pt states she had an ICD placed.    Pt moved back to [place].  Pt saw Dr Cowart.  Last ICD check 10/18 indicated JOSE 9 years.  Pt never has received a shock.    Pt is no longer using her remote device interrogator.    Active.  Walks daily.  Short of breath with running Pt c/o chest pain when running .  Pt is able to climb stairs without difficulty.    Dr Chacon had recommended a nuclear stress test but pt declined.    Last echo was in .    Review of Systems   Cardiovascular: Positive for chest pain and dyspnea on exertion. Negative for claudication, irregular heartbeat, leg swelling, near-syncope, orthopnea, palpitations and syncope.      Pt had a bad reaction to lisinopril (fluid) and losartan (high potassium).  Pt has had bad reactions to clindamycin (inflamed esophagus) and Augmentin (swelliing and itching of hands)  Pt developed muscle soreness on Lipitor and Crestor and pravastatin(which also caused chest pain.)      Past Medical History:   Diagnosis Date    Acute coronary syndrome     Allergy     Arthritis     Cardiomyopathy     CHF (congestive heart failure)     Coronary artery disease     Diverticulitis     Diverticulosis     Heart attack     Heart disease     Hyperlipidemia     Hypertension     ICD (implantable cardioverter-defibrillator) in place         Past Surgical History:   Procedure Laterality Date    ATRIAL CARDIAC PACEMAKER INSERTION       SECTION      CORONARY STENT PLACEMENT         Family History   Problem Relation Age of Onset    Heart disease Mother     Emphysema Father     Heart attack Brother        Social History     Socioeconomic History    Marital status:      Spouse name: None     Number of children: None    Years of education: None    Highest education level: None   Social Needs    Financial resource strain: None    Food insecurity - worry: None    Food insecurity - inability: None    Transportation needs - medical: None    Transportation needs - non-medical: None   Occupational History    None   Tobacco Use    Smoking status: Former Smoker     Packs/day: 0.50     Start date: 1976     Last attempt to quit: 2012     Years since quittin.1    Smokeless tobacco: Never Used   Substance and Sexual Activity    Alcohol use: No    Drug use: No    Sexual activity: None   Other Topics Concern    None   Social History Narrative    None       Current Outpatient Medications on File Prior to Visit   Medication Sig Dispense Refill    ALPRAZolam (XANAX) 0.5 MG tablet Take 1 tablet (0.5 mg total) by mouth nightly as needed. 30 tablet 3    aspirin (ECOTRIN) 81 MG EC tablet Take 81 mg by mouth once daily.      carvedilol (COREG) 12.5 MG tablet Take 1 tablet (12.5 mg total) by mouth 2 (two) times daily. 180 tablet 3    citalopram (CELEXA) 20 MG tablet Take 1 tablet (20 mg total) by mouth once daily. 90 tablet 3    omeprazole (PRILOSEC) 20 MG capsule Take 2 capsules (40 mg total) by mouth once daily. 30 capsule 0    albuterol (PROVENTIL/VENTOLIN HFA) 90 mcg/actuation inhaler Inhale 2 puffs into the lungs every 6 (six) hours as needed for Wheezing. Rescue 18 g 1    amoxicillin (AMOXIL) 500 MG capsule Take 1 capsule (500 mg total) by mouth every 12 (twelve) hours. 20 capsule 0    ondansetron (ZOFRAN-ODT) 4 MG TbDL Take 1 tablet (4 mg total) by mouth every 6 (six) hours as needed. 20 tablet 0    [DISCONTINUED] ranitidine (ZANTAC) 300 MG tablet Take 1 tablet (300 mg total) by mouth every evening. 30 tablet 2     No current facility-administered medications on file prior to visit.        Review of patient's allergies indicates:   Allergen Reactions    Lisinopril Other (See  "Comments)     Fluid around heart    Losartan Other (See Comments)     High potassium    Shellfish containing products Anaphylaxis     Pt.states she is allergic to SEAFOOD since the age of 13    Statins-hmg-coa reductase inhibitors Other (See Comments)     Chest pains    Levaquin [levofloxacin] Other (See Comments)     Very bad joint pain    Clindamycin Palpitations     Chest pain     Objective:     Vitals:    02/04/19 0857   BP: 123/75   BP Location: Right arm   Patient Position: Sitting   BP Method: Large (Automatic)   Pulse: 60   Weight: 68.9 kg (151 lb 14.4 oz)   Height: 5' 3" (1.6 m)        Physical Exam   Constitutional: She is oriented to person, place, and time. She appears well-developed and well-nourished.   Eyes: No scleral icterus.   Neck: No JVD present. Carotid bruit is not present.   Cardiovascular: Normal rate and regular rhythm. Exam reveals no gallop.   Murmur (I/VI systolic) heard.  Pulses:       Dorsalis pedis pulses are 2+ on the right side.        Posterior tibial pulses are 2+ on the right side, and 2+ on the left side.   Pulmonary/Chest: She has rales in the right lower field and the left lower field.   Musculoskeletal: She exhibits no edema.   Neurological: She is alert and oriented to person, place, and time.   Skin: Skin is warm and dry.   Psychiatric: She has a normal mood and affect. Her behavior is normal. Judgment and thought content normal.   Vitals reviewed.     ECG 1/16/19--atrial pacing with native AV conduction; anteroseptal infarct; RBBB, LAHB    Recent lab1/21/19-- CBC and CMP WNL,  Cholesterol 295, HDL 59,    Assessment:     1. Old MI (myocardial infarction)    2. S/P PTCA (percutaneous transluminal coronary angioplasty)    3. Ischemic cardiomyopathy    4. AICD (automatic cardioverter/defibrillator) present    5. Pure hypercholesterolemia    6. Essential hypertension      Plan:   Odette was seen today for coronary artery disease.    Diagnoses and all orders for " this visit:    Old MI (myocardial infarction)    S/P PTCA (percutaneous transluminal coronary angioplasty)    Ischemic cardiomyopathy    AICD (automatic cardioverter/defibrillator) present    Pure hypercholesterolemia  -     Lipid panel; Future  -     ezetimibe (ZETIA) 10 mg tablet; Take 1 tablet (10 mg total) by mouth once daily.    Essential hypertension       Avoid NSAID's.  Substitute Tylenol.    Pt declines another trial of losartan.  Pt declines trial of Entresto.     Pt declines Lexiscan Myoview stress test.  Pt declines treadmill Myoview stress test.  Pt does not feel her current symptoms warrant a stress test.  Pt attributes her recent non-exertional chest pains to GERD after taking clindamycin.    Pt is willing to try Zetia for hyperlipidemia    Continue carvedilol and ASA    Will check echocardiogram    Consider treadmill stress echocardiogram although pt's RBBB and LAHB will make interpretation of wall motion abnormalities problematic.      CXR has been ordered already by Dr Barragan.    Need records from [place]    Follow-up in about 2 months (around 4/4/2019). with lipid profile.  May need to add colestipol.    Pt needs a new [employer] remote AICD interrogator.

## 2019-02-04 NOTE — LETTER
February 4, 2019      Braxton Barragan MD  [address] [place] [address]           [facility]  [address]  [place] [address]  Phone: [phone]  Fax: [phone]          Patient: Odette Hart   MR Number: [id number]   YOB: 1955   Date of Visit: 2/4/2019       Dear Dr. Braxton Barragan:    Thank you for referring Odette Hart to me for evaluation. Attached you will find relevant portions of my assessment and plan of care.    If you have questions, please do not hesitate to call me. I look forward to following Odette Hart along with you.    Sincerely,    Freddy Arenas MD    Enclosure  CC:  No Recipients    If you would like to receive this communication electronically, please contact [email or web address] or [phone] to request more information on [employer] Link access.    For providers and/or their staff who would like to refer a patient to [facility], please contact us through our one-stop-shop provider referral line, [facility], at [phone].    If you feel you have received this communication in error or would no longer like to receive these types of communications, please e-mail [email or web address]

## 2019-02-11 ENCOUNTER — TELEPHONE (OUTPATIENT)
Dept: CARDIOLOGY | Facility: CLINIC | Age: 64
End: 2019-02-11

## 2019-02-12 NOTE — TELEPHONE ENCOUNTER
Echocardiogram shows anteroseptal and apical akinesis with an LVEF 20%  CXR shows AICD in place, mild cardiomegaly and minimal central pulmonary vasculature prominence.  Discussed with pt.  Continue same meds  Will add a diuretic if pt develops symptoms of shortness of breath and again explain rationale for Entresto which pt has declined.

## 2019-03-07 ENCOUNTER — OFFICE VISIT (OUTPATIENT)
Dept: PRIMARY CARE CLINIC | Facility: CLINIC | Age: 64
End: 2019-03-07
Payer: MEDICARE

## 2019-03-07 VITALS
SYSTOLIC BLOOD PRESSURE: 130 MMHG | HEART RATE: 65 BPM | HEIGHT: 63 IN | DIASTOLIC BLOOD PRESSURE: 68 MMHG | OXYGEN SATURATION: 98 % | BODY MASS INDEX: 27.46 KG/M2 | WEIGHT: 155 LBS | RESPIRATION RATE: 18 BRPM

## 2019-03-07 DIAGNOSIS — M25.531 RIGHT WRIST PAIN: ICD-10-CM

## 2019-03-07 DIAGNOSIS — E66.3 OVERWEIGHT (BMI 25.0-29.9): ICD-10-CM

## 2019-03-07 DIAGNOSIS — Z98.61 S/P PTCA (PERCUTANEOUS TRANSLUMINAL CORONARY ANGIOPLASTY): ICD-10-CM

## 2019-03-07 DIAGNOSIS — R76.8 ELEVATED RHEUMATOID FACTOR: Primary | ICD-10-CM

## 2019-03-07 DIAGNOSIS — I25.2 OLD MI (MYOCARDIAL INFARCTION): ICD-10-CM

## 2019-03-07 DIAGNOSIS — I50.9 CONGESTIVE HEART FAILURE, UNSPECIFIED HF CHRONICITY, UNSPECIFIED HEART FAILURE TYPE: ICD-10-CM

## 2019-03-07 DIAGNOSIS — R07.9 CHEST PAIN: ICD-10-CM

## 2019-03-07 DIAGNOSIS — R21 SKIN RASH: ICD-10-CM

## 2019-03-07 DIAGNOSIS — Z95.810 AICD (AUTOMATIC CARDIOVERTER/DEFIBRILLATOR) PRESENT: ICD-10-CM

## 2019-03-07 DIAGNOSIS — I25.5 ISCHEMIC CARDIOMYOPATHY: ICD-10-CM

## 2019-03-07 PROCEDURE — 3075F SYST BP GE 130 - 139MM HG: CPT | Mod: CPTII,S$GLB,, | Performed by: FAMILY MEDICINE

## 2019-03-07 PROCEDURE — 3008F BODY MASS INDEX DOCD: CPT | Mod: CPTII,S$GLB,, | Performed by: FAMILY MEDICINE

## 2019-03-07 PROCEDURE — 3075F PR MOST RECENT SYSTOLIC BLOOD PRESS GE 130-139MM HG: ICD-10-PCS | Mod: CPTII,S$GLB,, | Performed by: FAMILY MEDICINE

## 2019-03-07 PROCEDURE — 3078F PR MOST RECENT DIASTOLIC BLOOD PRESSURE < 80 MM HG: ICD-10-PCS | Mod: CPTII,S$GLB,, | Performed by: FAMILY MEDICINE

## 2019-03-07 PROCEDURE — 3078F DIAST BP <80 MM HG: CPT | Mod: CPTII,S$GLB,, | Performed by: FAMILY MEDICINE

## 2019-03-07 PROCEDURE — 99999 PR PBB SHADOW E&M-EST. PATIENT-LVL IV: CPT | Mod: PBBFAC,,, | Performed by: FAMILY MEDICINE

## 2019-03-07 PROCEDURE — 96372 THER/PROPH/DIAG INJ SC/IM: CPT | Mod: S$GLB,,, | Performed by: FAMILY MEDICINE

## 2019-03-07 PROCEDURE — 3008F PR BODY MASS INDEX (BMI) DOCUMENTED: ICD-10-PCS | Mod: CPTII,S$GLB,, | Performed by: FAMILY MEDICINE

## 2019-03-07 PROCEDURE — 99999 PR PBB SHADOW E&M-EST. PATIENT-LVL IV: ICD-10-PCS | Mod: PBBFAC,,, | Performed by: FAMILY MEDICINE

## 2019-03-07 PROCEDURE — 96372 PR INJECTION,THERAP/PROPH/DIAG2ST, IM OR SUBCUT: ICD-10-PCS | Mod: S$GLB,,, | Performed by: FAMILY MEDICINE

## 2019-03-07 PROCEDURE — 99214 OFFICE O/P EST MOD 30 MIN: CPT | Mod: 25,S$GLB,, | Performed by: FAMILY MEDICINE

## 2019-03-07 PROCEDURE — 99214 PR OFFICE/OUTPT VISIT, EST, LEVL IV, 30-39 MIN: ICD-10-PCS | Mod: 25,S$GLB,, | Performed by: FAMILY MEDICINE

## 2019-03-07 RX ORDER — METHYLPREDNISOLONE 4 MG/1
TABLET ORAL
Qty: 1 PACKAGE | Refills: 0 | Status: SHIPPED | OUTPATIENT
Start: 2019-03-07 | End: 2019-04-05

## 2019-03-07 RX ORDER — IBUPROFEN 600 MG/1
600 TABLET ORAL EVERY 8 HOURS PRN
Qty: 30 TABLET | Refills: 5 | Status: SHIPPED | OUTPATIENT
Start: 2019-03-07 | End: 2019-04-05 | Stop reason: SDUPTHER

## 2019-03-07 RX ORDER — BETAMETHASONE VALERATE 1.2 MG/G
CREAM TOPICAL 2 TIMES DAILY
Qty: 60 G | Refills: 2 | Status: SHIPPED | OUTPATIENT
Start: 2019-03-07 | End: 2019-09-09

## 2019-03-07 RX ORDER — TRIAMCINOLONE ACETONIDE 40 MG/ML
40 INJECTION, SUSPENSION INTRA-ARTICULAR; INTRAMUSCULAR
Status: COMPLETED | OUTPATIENT
Start: 2019-03-07 | End: 2019-03-07

## 2019-03-07 RX ORDER — OMEPRAZOLE 40 MG/1
40 CAPSULE, DELAYED RELEASE ORAL DAILY
Qty: 30 CAPSULE | Refills: 5 | Status: SHIPPED | OUTPATIENT
Start: 2019-03-07 | End: 2019-09-09 | Stop reason: SDUPTHER

## 2019-03-07 RX ORDER — HYDROCODONE BITARTRATE AND ACETAMINOPHEN 5; 325 MG/1; MG/1
1 TABLET ORAL EVERY 6 HOURS PRN
Qty: 30 TABLET | Refills: 0 | Status: SHIPPED | OUTPATIENT
Start: 2019-03-07 | End: 2019-04-05

## 2019-03-07 RX ADMIN — TRIAMCINOLONE ACETONIDE 40 MG: 40 INJECTION, SUSPENSION INTRA-ARTICULAR; INTRAMUSCULAR at 03:03

## 2019-03-07 NOTE — PROGRESS NOTES
Subjective:       Patient ID: Odette Hart is a 63 y.o. female.    Chief Complaint: Wrist Pain (fell)    HPI:  63-year-old female complaining of right wrist pain--secondary to a fall--fell last night.  Patient went to get something to eat at [employer]--was going to the bathroom.  Patient noticed a black Square in front of the door--patient thought it was a mat--but as she went to step on it slid fall ordered and patient's leg went fall ordered she fell backward--patient tried to catch herself with her right arm and hyperextended her wrist.  Patient then realize it was not a map but was a ceiling tile and had fallen on the floor and no one had picked it up.  Told Nic about it--the owner's son after 2 men who were  eating at a table and help patient up.  Patient had a large swelling on the radial aspect of the right wrist like like an  egg .  Patient put ice on the wrist.  Then patient left  And went home--upset they never got pt's telephone number.  Patient had difficulty sleeping because it hurts so much.  Patient did not want go to the emergency room because of cost of a co-pay.  Had and wrist splint at home so placed in on her right wrist.  Patient has pain even if she is holding the wrist still but has difficulty extending and flexing the wrist and with inversion eversion.  Pain especially in the radial aspect the wrist but does have some pain in the ulnar aspect of the wrist and anterior wrist.  Some swelling radial wrist and 2nd and 3rd digits--fingers.  Can even but her pants       Skin rash on the lower legs bilaterally between the knees and ankles follicular pattern patient states no new soaps no new detergents no new medicines no new foods no new clothes no guarding  ROS:  Skin: no psoriasis, eczema, skin cancer  HEENT: No headache, ocular pain, blurred vision, diplopia, epistaxis, hoarseness change in voice, thyroid trouble  Lung: No pneumonia, asthma, Tb, wheezing, SOB, no smoking  Heart:  No chest pain, ankle edema, palpitations, MI, herb murmur, +hypertension,+ hyperlipidemia,m cardiomyopathy, congestive heart failure, coronary artery disease with stent +old MI has pacemaker defibrillator  Abdomen: No nausea, vomiting, diarrhea, constipation, ulcers, hepatitis, gallbladder disease, melena, hematochezia, hematemesis  : no UTI, renal disease, stones  GYN uterine prolapse states think she needs a hysterectomy no mammogram  MS: no fractures, O/A, lupus, rheumatoid, gout + rheumatoid factor 500 or more Dr. Barragan feels patient may have rheumatoid arthritis  Neuro: No dizziness, LOC, seizures   No diabetes, no anemia, no anxiety, no depression   , 2 children, work unemployed on social security, lives alone    Objective:   Physical Exam:  General: Well nourished, well developed, no acute distress +overweight  Skin:  Follicular lesions on the lower legs bilaterally between the knee and the ankles   HEENT: Eyes PERRLA, EOM intact, nose patent, throat non-erythematous ears TMs clear  NECK: Supple, no bruits, No JVD, no nodes  Lungs: Clear, no rales, rhonchi, wheezing  Heart: Regular rate and rhythm, no murmurs, gallops, or rubs  Abdomen: flat, bowel sounds positive, no tenderness, or organomegaly  MS:  Tenderness in the right wrist especially the radial aspect with some swelling very tender on palpation pain with flexion extension inversion eversion also some discomfort on palpation with the anterior wrist and ulnar aspect of the wrist but much less severe than radial aspect of the wrist--slight swelling of the 2nd 3rd digits of the right hand  Neuro: Alert, CN intact, oriented X 3  Extremities: No cyanosis, clubbing, or edema  patient feels some swelling of the feet        Assessment:       1. Elevated rheumatoid factor    2. Right wrist pain    3. Chest pain    4. Skin rash    5. Overweight (BMI 25.0-29.9)    6. Ischemic cardiomyopathy    7. AICD (automatic cardioverter/defibrillator) present    8.  S/P PTCA (percutaneous transluminal coronary angioplasty)    9. Old MI (myocardial infarction)    10. Congestive heart failure, unspecified HF chronicity, unspecified heart failure type        Plan:       Elevated rheumatoid factor    Right wrist pain  -     X-Ray Wrist Complete 3 views Right; Future; Expected date: 03/07/2019    Chest pain  -     omeprazole (PRILOSEC) 40 MG capsule; Take 1 capsule (40 mg total) by mouth once daily.  Dispense: 30 capsule; Refill: 5    Skin rash    Overweight (BMI 25.0-29.9)    Ischemic cardiomyopathy    AICD (automatic cardioverter/defibrillator) present    S/P PTCA (percutaneous transluminal coronary angioplasty)    Old MI (myocardial infarction)    Congestive heart failure, unspecified HF chronicity, unspecified heart failure type    Other orders  -     triamcinolone acetonide injection 40 mg  -     methylPREDNISolone (MEDROL DOSEPACK) 4 mg tablet; use as directed  Dispense: 1 Package; Refill: 0  -     ibuprofen (ADVIL,MOTRIN) 600 MG tablet; Take 1 tablet (600 mg total) by mouth every 8 (eight) hours as needed for Pain.  Dispense: 30 tablet; Refill: 5  -     betamethasone valerate 0.1% (VALISONE) 0.1 % Crea; Apply topically 2 (two) times daily. Apply to rash b.i.d. or p.r.n. itch do not apply to the face  Dispense: 60 g; Refill: 2  -     HYDROcodone-acetaminophen (NORCO) 5-325 mg per tablet; Take 1 tablet by mouth every 6 (six) hours as needed.  Dispense: 30 tablet; Refill: 0      x-ray right wrist--Ice times 48 hr then heat--continue wrist splint may benefit from a sling--Norco--after Medrol Dosepak complete ibuprofen 600 mg 1 q.8 hours  Due to skin rash--Kenalog/Medrol/Valisone cream b.i.d. or p.r.n. Itch  Patient's history of possible rheumatoid arthritis Dr. Barragan to decide on referral to rheumatologist  Significant coronary artery disease  Ret 2 weeks recheck wrist   History rheumatoid factor elevated 519 patient referred to rheumatologist

## 2019-03-07 NOTE — PROGRESS NOTES
Patient verified by name and . Allergies verified. Kenalog 40mg given im to left ventrogluteal using aseptic technique. Patient tolerated well

## 2019-03-08 ENCOUNTER — OFFICE VISIT (OUTPATIENT)
Dept: ORTHOPEDICS | Facility: CLINIC | Age: 64
End: 2019-03-08
Payer: MEDICARE

## 2019-03-08 VITALS
DIASTOLIC BLOOD PRESSURE: 65 MMHG | BODY MASS INDEX: 27.65 KG/M2 | HEART RATE: 69 BPM | WEIGHT: 156.06 LBS | SYSTOLIC BLOOD PRESSURE: 114 MMHG | HEIGHT: 63 IN

## 2019-03-08 DIAGNOSIS — S52.531A CLOSED COLLES' FRACTURE OF RIGHT RADIUS, INITIAL ENCOUNTER: Primary | ICD-10-CM

## 2019-03-08 PROCEDURE — 3008F BODY MASS INDEX DOCD: CPT | Mod: CPTII,S$GLB,, | Performed by: ORTHOPAEDIC SURGERY

## 2019-03-08 PROCEDURE — 3074F PR MOST RECENT SYSTOLIC BLOOD PRESSURE < 130 MM HG: ICD-10-PCS | Mod: CPTII,S$GLB,, | Performed by: ORTHOPAEDIC SURGERY

## 2019-03-08 PROCEDURE — 99203 OFFICE O/P NEW LOW 30 MIN: CPT | Mod: 25,S$GLB,, | Performed by: ORTHOPAEDIC SURGERY

## 2019-03-08 PROCEDURE — 3008F PR BODY MASS INDEX (BMI) DOCUMENTED: ICD-10-PCS | Mod: CPTII,S$GLB,, | Performed by: ORTHOPAEDIC SURGERY

## 2019-03-08 PROCEDURE — 99999 PR PBB SHADOW E&M-EST. PATIENT-LVL III: CPT | Mod: PBBFAC,,, | Performed by: ORTHOPAEDIC SURGERY

## 2019-03-08 PROCEDURE — 29075 PR APPLY FOREARM CAST: ICD-10-PCS | Mod: RT,S$GLB,, | Performed by: ORTHOPAEDIC SURGERY

## 2019-03-08 PROCEDURE — 99203 PR OFFICE/OUTPT VISIT, NEW, LEVL III, 30-44 MIN: ICD-10-PCS | Mod: 25,S$GLB,, | Performed by: ORTHOPAEDIC SURGERY

## 2019-03-08 PROCEDURE — 3074F SYST BP LT 130 MM HG: CPT | Mod: CPTII,S$GLB,, | Performed by: ORTHOPAEDIC SURGERY

## 2019-03-08 PROCEDURE — 29075 APPL CST ELBW FNGR SHORT ARM: CPT | Mod: RT,S$GLB,, | Performed by: ORTHOPAEDIC SURGERY

## 2019-03-08 PROCEDURE — 3078F DIAST BP <80 MM HG: CPT | Mod: CPTII,S$GLB,, | Performed by: ORTHOPAEDIC SURGERY

## 2019-03-08 PROCEDURE — 3078F PR MOST RECENT DIASTOLIC BLOOD PRESSURE < 80 MM HG: ICD-10-PCS | Mod: CPTII,S$GLB,, | Performed by: ORTHOPAEDIC SURGERY

## 2019-03-08 PROCEDURE — 99999 PR PBB SHADOW E&M-EST. PATIENT-LVL III: ICD-10-PCS | Mod: PBBFAC,,, | Performed by: ORTHOPAEDIC SURGERY

## 2019-03-08 NOTE — PATIENT INSTRUCTIONS
Mrs. Hart has a minimally displaced fracture of right distal radius which was gently manipulated and a cast was applied. She is advised to keep the hand elevated and the fingers moving. She is advised to come to the ER if her pain gets worse or any tingling or numbness of hand appears.

## 2019-03-08 NOTE — LETTER
March 8, 2019      Scout Smith MD  [address] [place] [address]           [facility]  [address]  [place] [address]  Phone: [phone]  Fax: [phone]          Patient: Odette Hart   MR Number: [id number]   YOB: 1955   Date of Visit: 3/8/2019       Dear Dr. Scout Smith:    Thank you for referring Odette Hart to me for evaluation. Attached you will find relevant portions of my assessment and plan of care.    If you have questions, please do not hesitate to call me. I look forward to following Odette Hart along with you.    Sincerely,    Cody Ellis MD    Enclosure  CC:  No Recipients    If you would like to receive this communication electronically, please contact [email or web address] or [phone] to request more information on "[employer] Link access.    For providers and/or their staff who would like to refer a patient to [facility], please contact us through our one-stop-shop provider referral line, [facility], at [phone].    If you feel you have received this communication in error or would no longer like to receive these types of communications, please e-mail [email or web address]

## 2019-03-08 NOTE — PROGRESS NOTES
Subjective:      Patient ID: Odette Hart is a 63 y.o. female.    Chief Complaint: Pain and Injury of the Left Wrist    Distal Radius Fracture: Patient here for a right wrist injury. This is evaluated as a personal injury. The injury occurred 2days ago. Since that time they have been experiencing right wrist pain and swelling.  The pain is currently rated moderate.  They were originally seen at physician's office where an x-ray was done, a fracture of the wrist was detected and was referred here.Her X-ray showed minimally displaced colles fracture which was gently manipulated and the patient was placed in a short arm cast.      Social History     Occupational History    Not on file   Tobacco Use    Smoking status: Former Smoker     Packs/day: 0.50     Start date: 1976     Last attempt to quit: 2012     Years since quittin.2    Smokeless tobacco: Never Used   Substance and Sexual Activity    Alcohol use: No    Drug use: No    Sexual activity: Not on file      Review of Systems   Musculoskeletal: Positive for joint pain, joint swelling, muscle cramps and myalgias.         Objective:    Left Ankle Exam     Comments:  Patient has mild swelling of her right wrist. Neurovascular systems were intact. X-ray showed minimally displaced fracture of distal radius on right. After gentle manipulation she was placed in a short arm cast.               Assessment:       1. Closed Colles' fracture of right radius, initial encounter          Plan:     After gentle manipulation a short arm cast was applied.

## 2019-03-11 ENCOUNTER — TELEPHONE (OUTPATIENT)
Dept: ORTHOPEDICS | Facility: CLINIC | Age: 64
End: 2019-03-11

## 2019-03-11 DIAGNOSIS — T14.8XXA FRACTURE: Primary | ICD-10-CM

## 2019-03-11 NOTE — TELEPHONE ENCOUNTER
Patient advised imaging needed prior to appointment patient verbalized understanding and no other issues discussed.

## 2019-03-18 ENCOUNTER — OFFICE VISIT (OUTPATIENT)
Dept: ORTHOPEDICS | Facility: CLINIC | Age: 64
End: 2019-03-18
Payer: MEDICARE

## 2019-03-18 VITALS
WEIGHT: 155.88 LBS | DIASTOLIC BLOOD PRESSURE: 70 MMHG | SYSTOLIC BLOOD PRESSURE: 126 MMHG | HEART RATE: 60 BPM | BODY MASS INDEX: 27.61 KG/M2

## 2019-03-18 DIAGNOSIS — S52.531D CLOSED COLLES' FRACTURE OF RIGHT RADIUS WITH ROUTINE HEALING, SUBSEQUENT ENCOUNTER: Primary | ICD-10-CM

## 2019-03-18 PROCEDURE — 3078F DIAST BP <80 MM HG: CPT | Mod: CPTII,S$GLB,, | Performed by: ORTHOPAEDIC SURGERY

## 2019-03-18 PROCEDURE — 3008F BODY MASS INDEX DOCD: CPT | Mod: CPTII,S$GLB,, | Performed by: ORTHOPAEDIC SURGERY

## 2019-03-18 PROCEDURE — 99212 OFFICE O/P EST SF 10 MIN: CPT | Mod: S$GLB,,, | Performed by: ORTHOPAEDIC SURGERY

## 2019-03-18 PROCEDURE — 3078F PR MOST RECENT DIASTOLIC BLOOD PRESSURE < 80 MM HG: ICD-10-PCS | Mod: CPTII,S$GLB,, | Performed by: ORTHOPAEDIC SURGERY

## 2019-03-18 PROCEDURE — 99999 PR PBB SHADOW E&M-EST. PATIENT-LVL III: CPT | Mod: PBBFAC,,, | Performed by: ORTHOPAEDIC SURGERY

## 2019-03-18 PROCEDURE — 99999 PR PBB SHADOW E&M-EST. PATIENT-LVL III: ICD-10-PCS | Mod: PBBFAC,,, | Performed by: ORTHOPAEDIC SURGERY

## 2019-03-18 PROCEDURE — 3074F SYST BP LT 130 MM HG: CPT | Mod: CPTII,S$GLB,, | Performed by: ORTHOPAEDIC SURGERY

## 2019-03-18 PROCEDURE — 3074F PR MOST RECENT SYSTOLIC BLOOD PRESSURE < 130 MM HG: ICD-10-PCS | Mod: CPTII,S$GLB,, | Performed by: ORTHOPAEDIC SURGERY

## 2019-03-18 PROCEDURE — 99212 PR OFFICE/OUTPT VISIT, EST, LEVL II, 10-19 MIN: ICD-10-PCS | Mod: S$GLB,,, | Performed by: ORTHOPAEDIC SURGERY

## 2019-03-18 PROCEDURE — 3008F PR BODY MASS INDEX (BMI) DOCUMENTED: ICD-10-PCS | Mod: CPTII,S$GLB,, | Performed by: ORTHOPAEDIC SURGERY

## 2019-03-18 RX ORDER — MELOXICAM 15 MG/1
15 TABLET ORAL DAILY
Qty: 15 TABLET | Refills: 0 | Status: SHIPPED | OUTPATIENT
Start: 2019-03-18 | End: 2019-03-18 | Stop reason: ALTCHOICE

## 2019-03-18 NOTE — PROGRESS NOTES
Subjective:      Patient ID: Odette Hart is a 63 y.o. female.    Chief Complaint: Injury of the Right Forearm and Follow-up    Ms. Hart is doing well. Pain decreasing. Cast intact.       Social History     Occupational History    Not on file   Tobacco Use    Smoking status: Former Smoker     Packs/day: 0.50     Start date: 1976     Last attempt to quit: 2012     Years since quittin.2    Smokeless tobacco: Never Used   Substance and Sexual Activity    Alcohol use: No    Drug use: No    Sexual activity: Not on file      Review of Systems   Musculoskeletal: Positive for joint pain.         Objective:    Right Hand Exam     Comments:  She is in a cast. Her neurovascular status is intact. X-ray shows acceptable position of fracture.               Assessment:       1. Closed Colles' fracture of right radius with routine healing, subsequent encounter          Plan:     Return in 2 weeks with X-rays.

## 2019-03-19 ENCOUNTER — TELEPHONE (OUTPATIENT)
Dept: CARDIOLOGY | Facility: CLINIC | Age: 64
End: 2019-03-19

## 2019-03-19 NOTE — TELEPHONE ENCOUNTER
I spoke with pt,  Tylenol is OK for wrist fracture   Pt advised to avoid NSAID's due to potential for retention of fluid due to severe cardiomyopathy

## 2019-04-01 ENCOUNTER — TELEPHONE (OUTPATIENT)
Dept: SURGERY | Facility: CLINIC | Age: 64
End: 2019-04-01

## 2019-04-01 ENCOUNTER — OFFICE VISIT (OUTPATIENT)
Dept: ORTHOPEDICS | Facility: CLINIC | Age: 64
End: 2019-04-01
Payer: MEDICARE

## 2019-04-01 ENCOUNTER — DOCUMENTATION ONLY (OUTPATIENT)
Dept: SURGERY | Facility: CLINIC | Age: 64
End: 2019-04-01

## 2019-04-01 VITALS — SYSTOLIC BLOOD PRESSURE: 130 MMHG | HEART RATE: 59 BPM | DIASTOLIC BLOOD PRESSURE: 72 MMHG

## 2019-04-01 DIAGNOSIS — S52.531D COLLES' FRACTURE OF RIGHT RADIUS, SUBSEQUENT ENCOUNTER FOR CLOSED FRACTURE WITH ROUTINE HEALING: Primary | ICD-10-CM

## 2019-04-01 PROCEDURE — 99214 PR OFFICE/OUTPT VISIT, EST, LEVL IV, 30-39 MIN: ICD-10-PCS | Mod: S$GLB,,, | Performed by: ORTHOPAEDIC SURGERY

## 2019-04-01 PROCEDURE — 99214 OFFICE O/P EST MOD 30 MIN: CPT | Mod: S$GLB,,, | Performed by: ORTHOPAEDIC SURGERY

## 2019-04-01 PROCEDURE — 99999 PR PBB SHADOW E&M-EST. PATIENT-LVL III: ICD-10-PCS | Mod: PBBFAC,,, | Performed by: ORTHOPAEDIC SURGERY

## 2019-04-01 PROCEDURE — 3075F SYST BP GE 130 - 139MM HG: CPT | Mod: CPTII,S$GLB,, | Performed by: ORTHOPAEDIC SURGERY

## 2019-04-01 PROCEDURE — 99999 PR PBB SHADOW E&M-EST. PATIENT-LVL III: CPT | Mod: PBBFAC,,, | Performed by: ORTHOPAEDIC SURGERY

## 2019-04-01 PROCEDURE — 3078F DIAST BP <80 MM HG: CPT | Mod: CPTII,S$GLB,, | Performed by: ORTHOPAEDIC SURGERY

## 2019-04-01 PROCEDURE — 3078F PR MOST RECENT DIASTOLIC BLOOD PRESSURE < 80 MM HG: ICD-10-PCS | Mod: CPTII,S$GLB,, | Performed by: ORTHOPAEDIC SURGERY

## 2019-04-01 PROCEDURE — 3075F PR MOST RECENT SYSTOLIC BLOOD PRESS GE 130-139MM HG: ICD-10-PCS | Mod: CPTII,S$GLB,, | Performed by: ORTHOPAEDIC SURGERY

## 2019-04-01 NOTE — PATIENT INSTRUCTIONS
Mrs. Hart is doing well in cast. She has pain in her left hand due to overuse. She is advised to return in 2 weeks with X-rays and the cast will be removed at next visit.

## 2019-04-01 NOTE — PROGRESS NOTES
Subjective:      Patient ID: Odette Hart is a 63 y.o. female.    Chief Complaint: Follow-up (2 week folow up)      HPI: Mrs. Hart is back for follow up with X-rays. She is comfortable in cast and pain is subsiding. She has pain in her left wrist and hand from overuse.     Past Medical History:   Diagnosis Date    Acute coronary syndrome     Allergy     Arthritis     Cardiomyopathy     CHF (congestive heart failure)     Coronary artery disease     Diverticulitis     Diverticulosis     Heart attack     Heart disease     Hyperlipidemia     Hypertension     ICD (implantable cardioverter-defibrillator) in place      Past Surgical History:   Procedure Laterality Date    ATRIAL CARDIAC PACEMAKER INSERTION       SECTION      CORONARY STENT PLACEMENT       Social History     Socioeconomic History    Marital status:      Spouse name: Not on file    Number of children: Not on file    Years of education: Not on file    Highest education level: Not on file   Occupational History    Not on file   Social Needs    Financial resource strain: Not on file    Food insecurity:     Worry: Not on file     Inability: Not on file    Transportation needs:     Medical: Not on file     Non-medical: Not on file   Tobacco Use    Smoking status: Former Smoker     Packs/day: 0.50     Start date: 1976     Last attempt to quit: 2012     Years since quittin.3    Smokeless tobacco: Never Used   Substance and Sexual Activity    Alcohol use: No    Drug use: No    Sexual activity: Not on file   Lifestyle    Physical activity:     Days per week: Not on file     Minutes per session: Not on file    Stress: Not on file   Relationships    Social connections:     Talks on phone: Not on file     Gets together: Not on file     Attends Congregation service: Not on file     Active member of club or organization: Not on file     Attends meetings of clubs or organizations: Not on file      Relationship status: Not on file    Intimate partner violence:     Fear of current or ex partner: Not on file     Emotionally abused: Not on file     Physically abused: Not on file     Forced sexual activity: Not on file   Other Topics Concern    Not on file   Social History Narrative    Not on file         Current Outpatient Medications:     albuterol (PROVENTIL/VENTOLIN HFA) 90 mcg/actuation inhaler, Inhale 2 puffs into the lungs every 6 (six) hours as needed for Wheezing. Rescue, Disp: 18 g, Rfl: 1    ALPRAZolam (XANAX) 0.5 MG tablet, Take 1 tablet (0.5 mg total) by mouth nightly as needed., Disp: 30 tablet, Rfl: 3    aspirin (ECOTRIN) 81 MG EC tablet, Take 81 mg by mouth once daily., Disp: , Rfl:     betamethasone valerate 0.1% (VALISONE) 0.1 % Crea, Apply topically 2 (two) times daily. Apply to rash b.i.d. or p.r.n. itch do not apply to the face, Disp: 60 g, Rfl: 2    carvedilol (COREG) 12.5 MG tablet, Take 1 tablet (12.5 mg total) by mouth 2 (two) times daily., Disp: 180 tablet, Rfl: 3    citalopram (CELEXA) 20 MG tablet, Take 1 tablet (20 mg total) by mouth once daily., Disp: 90 tablet, Rfl: 3    ezetimibe (ZETIA) 10 mg tablet, Take 1 tablet (10 mg total) by mouth once daily., Disp: 90 tablet, Rfl: 3    HYDROcodone-acetaminophen (NORCO) 5-325 mg per tablet, Take 1 tablet by mouth every 6 (six) hours as needed., Disp: 30 tablet, Rfl: 0    ibuprofen (ADVIL,MOTRIN) 600 MG tablet, Take 1 tablet (600 mg total) by mouth every 8 (eight) hours as needed for Pain., Disp: 30 tablet, Rfl: 5    methylPREDNISolone (MEDROL DOSEPACK) 4 mg tablet, use as directed, Disp: 1 Package, Rfl: 0    omeprazole (PRILOSEC) 40 MG capsule, Take 1 capsule (40 mg total) by mouth once daily., Disp: 30 capsule, Rfl: 5    ondansetron (ZOFRAN-ODT) 4 MG TbDL, Take 1 tablet (4 mg total) by mouth every 6 (six) hours as needed., Disp: 20 tablet, Rfl: 0  Review of patient's allergies indicates:   Allergen Reactions    Augmentin  [amoxicillin-pot clavulanate] Swelling    Lisinopril Other (See Comments)     Fluid around heart    Losartan Other (See Comments)     High potassium    Shellfish containing products Anaphylaxis     Pt.states she is allergic to SEAFOOD since the age of 13    Statins-hmg-coa reductase inhibitors Other (See Comments)     Chest pains    Levaquin [levofloxacin] Other (See Comments)     Very bad joint pain    Clindamycin Palpitations     Chest pain       /72 (BP Location: Left arm, Patient Position: Sitting, BP Method: Medium (Automatic))   Pulse (!) 59     Review of Systems   Constitution: Negative.   HENT: Negative.    Eyes: Negative.    Cardiovascular: Negative.    Respiratory: Negative.    Endocrine: Negative.    Skin: Negative.    Musculoskeletal: Positive for joint pain, joint swelling, muscle cramps, myalgias, neck pain and stiffness. Negative for arthritis, back pain, falls, gout and muscle weakness.         Objective:    Right Hand Exam     Tenderness   The patient is experiencing tenderness in the pereyra area.    Range of Motion   The patient has normal right wrist ROM.     Muscle Strength   Wrist extension: 4/5   Wrist flexion: 4/5   : 4/5     Tests   Phalens Sign: negative  Tinel's sign (median nerve): negative  Finkelstein's test: negative    Other   Erythema: absent  Scars: absent  Sensation: normal  Pulse: present    Comments:  Mrs. Hart has pain in her left hand from overuse as the right hand is in cast.       Left Hand Exam     Comments:  Mrs. Hart's cast is intact and X-ray shows acceptable position of fracture.She is advised to   Return for follow up with x-rays.Cast will be removed at next visit.               Assessment:     Imagin. Colles' fracture of right radius, subsequent encounter for closed fracture with routine healing          Plan:          RTC in 2 weeks with X-rays.

## 2019-04-01 NOTE — TELEPHONE ENCOUNTER
----- Message from Paemla Kaiser sent at 4/1/2019 12:32 PM CDT -----  Contact: Patient  Type: Needs Medical Advice    Who Called:  Odette patient  Symptoms (please be specific):  Right wrist pain  How long has patient had these symptoms:  ?  Pharmacy name and phone #:  N/A  Best Call Back Number: [phone]  Additional Information: Calling because she just left the office and is confused about the xray reports and what Dr Srivastava told her. Please call her. Thanks.

## 2019-04-01 NOTE — TELEPHONE ENCOUNTER
Returned patient's call. Patient express concern  over x-ray reports. Requested a call from Dr. Ellis to address concerns. Patient have a follow up appointment with Dr Ellis in 2 weeks on 04/15/2019, and does not want to wait until follow up appointment to discuss x-ray report.

## 2019-04-05 ENCOUNTER — OFFICE VISIT (OUTPATIENT)
Dept: PRIMARY CARE CLINIC | Facility: CLINIC | Age: 64
End: 2019-04-05
Payer: MEDICARE

## 2019-04-05 VITALS
OXYGEN SATURATION: 97 % | SYSTOLIC BLOOD PRESSURE: 145 MMHG | DIASTOLIC BLOOD PRESSURE: 66 MMHG | RESPIRATION RATE: 18 BRPM | BODY MASS INDEX: 26.75 KG/M2 | WEIGHT: 151 LBS | HEIGHT: 63 IN | HEART RATE: 58 BPM

## 2019-04-05 DIAGNOSIS — M25.562 CHRONIC PAIN OF BOTH KNEES: ICD-10-CM

## 2019-04-05 DIAGNOSIS — Z98.61 S/P PTCA (PERCUTANEOUS TRANSLUMINAL CORONARY ANGIOPLASTY): ICD-10-CM

## 2019-04-05 DIAGNOSIS — G89.29 CHRONIC LEFT SHOULDER PAIN: ICD-10-CM

## 2019-04-05 DIAGNOSIS — M77.8 TENDINITIS OF LEFT HAND: ICD-10-CM

## 2019-04-05 DIAGNOSIS — I25.5 ISCHEMIC CARDIOMYOPATHY: ICD-10-CM

## 2019-04-05 DIAGNOSIS — E66.3 OVERWEIGHT (BMI 25.0-29.9): ICD-10-CM

## 2019-04-05 DIAGNOSIS — M25.561 CHRONIC PAIN OF BOTH KNEES: ICD-10-CM

## 2019-04-05 DIAGNOSIS — M25.512 CHRONIC LEFT SHOULDER PAIN: ICD-10-CM

## 2019-04-05 DIAGNOSIS — I25.2 OLD MI (MYOCARDIAL INFARCTION): ICD-10-CM

## 2019-04-05 DIAGNOSIS — M71.21 BAKER CYST, RIGHT: ICD-10-CM

## 2019-04-05 DIAGNOSIS — Z95.810 AICD (AUTOMATIC CARDIOVERTER/DEFIBRILLATOR) PRESENT: ICD-10-CM

## 2019-04-05 DIAGNOSIS — I50.9 CONGESTIVE HEART FAILURE, UNSPECIFIED HF CHRONICITY, UNSPECIFIED HEART FAILURE TYPE: ICD-10-CM

## 2019-04-05 DIAGNOSIS — R76.8 ELEVATED RHEUMATOID FACTOR: ICD-10-CM

## 2019-04-05 DIAGNOSIS — G89.29 CHRONIC PAIN OF BOTH KNEES: ICD-10-CM

## 2019-04-05 DIAGNOSIS — M79.602 LEFT ARM PAIN: ICD-10-CM

## 2019-04-05 DIAGNOSIS — S52.124D CLOSED NONDISPLACED FRACTURE OF HEAD OF RIGHT RADIUS WITH ROUTINE HEALING, SUBSEQUENT ENCOUNTER: Primary | ICD-10-CM

## 2019-04-05 PROCEDURE — 3008F PR BODY MASS INDEX (BMI) DOCUMENTED: ICD-10-PCS | Mod: CPTII,S$GLB,, | Performed by: FAMILY MEDICINE

## 2019-04-05 PROCEDURE — 99213 PR OFFICE/OUTPT VISIT, EST, LEVL III, 20-29 MIN: ICD-10-PCS | Mod: S$GLB,,, | Performed by: FAMILY MEDICINE

## 2019-04-05 PROCEDURE — 3008F BODY MASS INDEX DOCD: CPT | Mod: CPTII,S$GLB,, | Performed by: FAMILY MEDICINE

## 2019-04-05 PROCEDURE — 99213 OFFICE O/P EST LOW 20 MIN: CPT | Mod: S$GLB,,, | Performed by: FAMILY MEDICINE

## 2019-04-05 PROCEDURE — 3078F PR MOST RECENT DIASTOLIC BLOOD PRESSURE < 80 MM HG: ICD-10-PCS | Mod: CPTII,S$GLB,, | Performed by: FAMILY MEDICINE

## 2019-04-05 PROCEDURE — 99999 PR PBB SHADOW E&M-EST. PATIENT-LVL IV: ICD-10-PCS | Mod: PBBFAC,,, | Performed by: FAMILY MEDICINE

## 2019-04-05 PROCEDURE — 99999 PR PBB SHADOW E&M-EST. PATIENT-LVL IV: CPT | Mod: PBBFAC,,, | Performed by: FAMILY MEDICINE

## 2019-04-05 PROCEDURE — 3077F SYST BP >= 140 MM HG: CPT | Mod: CPTII,S$GLB,, | Performed by: FAMILY MEDICINE

## 2019-04-05 PROCEDURE — 3078F DIAST BP <80 MM HG: CPT | Mod: CPTII,S$GLB,, | Performed by: FAMILY MEDICINE

## 2019-04-05 PROCEDURE — 3077F PR MOST RECENT SYSTOLIC BLOOD PRESSURE >= 140 MM HG: ICD-10-PCS | Mod: CPTII,S$GLB,, | Performed by: FAMILY MEDICINE

## 2019-04-05 RX ORDER — HYDROCODONE BITARTRATE AND ACETAMINOPHEN 5; 325 MG/1; MG/1
1 TABLET ORAL EVERY 6 HOURS PRN
Qty: 30 TABLET | Refills: 0 | Status: SHIPPED | OUTPATIENT
Start: 2019-04-05 | End: 2019-09-09

## 2019-04-05 RX ORDER — METHOCARBAMOL 500 MG/1
500 TABLET, FILM COATED ORAL 4 TIMES DAILY
Qty: 40 TABLET | Refills: 0 | Status: SHIPPED | OUTPATIENT
Start: 2019-04-05 | End: 2019-04-15

## 2019-04-05 RX ORDER — IBUPROFEN 600 MG/1
600 TABLET ORAL EVERY 8 HOURS PRN
Qty: 30 TABLET | Refills: 5 | Status: SHIPPED | OUTPATIENT
Start: 2019-04-05 | End: 2019-09-09

## 2019-04-05 NOTE — PROGRESS NOTES
Subjective:       Patient ID: Odette Hart is a 63 y.o. female.    Chief Complaint: Arm Pain (left arm and hand pain ) and Knee Pain    HPI:  63-year-old female--fell 03/06/2019--sent for x-ray--found to have a fracture of the right wrist--saw --had a cast placed on the right wrist and hand and forearm.  Just saw Dr. Ellis 4 days ago has an appointment 2 weeks--04/15/2019.  Patient told patient right arm has pain from left shoulder to the left hand and especially the left 3rd digit--pain in the Mccoy tendon area of the 3rd digit occasionally sticks like to Nelson chin is contracture and pain radiates up the left forearm to the left upper arm. Told over use of arm occas can't raise left arm over the shoulder level or overhead--but is intermittent      Both  Knees  with pain in the popliteal area--but right much greater than left .  History of a Baker cyst in the past comes and goes        Office visit 03/07/2019   63-year-old female complaining of right wrist pain--secondary to a fall--fell last night.  Patient went to get something to eat at [employer]--was going to the bathroom.  Patient noticed a black Square in front of the door--patient thought it was a mat--but as she went to step on it slid fall ordered and patient's leg went fall ordered she fell backward--patient tried to catch herself with her right arm and hyperextended her wrist.  Patient then realize it was not a map but was a ceiling tile and had fallen on the floor and no one had picked it up.  Told Nic about it--the owner's son after 2 men who were  eating at a table and help patient up.  Patient had a large swelling on the radial aspect of the right wrist like like an  egg .  Patient put ice on the wrist.  Then patient left  And went home--upset they never got pt's telephone number.  Patient had difficulty sleeping because it hurts so much.  Patient did not want go to the emergency room because of cost of a co-pay.  Had and  wrist splint at home so placed in on her right wrist.  Patient has pain even if she is holding the wrist still but has difficulty extending and flexion  ROS:--no significant change--patient does have a cast on the right arm secondary fracture of the wrist--knee pain is Baker cyst--left shoulder pain--tenderness in the left palmar aspect of the 3rd digit  Skin: no psoriasis, eczema, skin cancer  HEENT: No headache, ocular pain, blurred vision, diplopia, epistaxis, hoarseness change in voice, thyroid trouble  Lung: No pneumonia, asthma, Tb, wheezing, SOB, no smoking  Heart: No chest pain, ankle edema, palpitations, MI, herb murmur, +hypertension,+ hyperlipidemia,m cardiomyopathy, congestive heart failure, coronary artery disease with stent +old MI has pacemaker defibrillator  Abdomen: No nausea, vomiting, diarrhea, constipation, ulcers, hepatitis, gallbladder disease, melena, hematochezia, hematemesis  : no UTI, renal disease, stones  GYN uterine prolapse states think she needs a hysterectomy no mammogram  MS: no fractures, O/A, lupus, rheumatoid, gout + rheumatoid factor 500 or more Dr. Barragan feels patient may have rheumatoid arthritis  Neuro: No dizziness, LOC, seizures   No diabetes, no anemia, no anxiety, no depression   , 2 children, work unemployed on social security, lives alone    Objective:   Physical Exam:  Significant change--cast right wrist, left hand pain especially the 3rd finger and 3rd Mccoy tendon, left arm pain, bilateral popliteal pain with Baker cyst right knee  General: Well nourished, well developed, no acute distress +overweight  Skin:  Follicular lesions on the lower legs bilaterally between the knee and the ankles   HEENT: Eyes PERRLA, EOM intact, nose patent, throat non-erythematous ears TMs clear  NECK: Supple, no bruits, No JVD, no nodes  Lungs: Clear, no rales, rhonchi, wheezing  Heart: Regular rate and rhythm, no murmurs, gallops, or rubs  Abdomen: flat, bowel sounds positive,  no tenderness, or organomegaly  MS:  Tenderness in the right wrist especially the radial aspect with some swelling very tender on palpation pain with flexion extension inversion eversion also some discomfort on palpation with the anterior wrist and ulnar aspect of the wrist but much less severe than radial aspect of the wrist--slight swelling of the 2nd 3rd digits of the right hand  Neuro: Alert, CN intact, oriented X 3  Extremities: No cyanosis, clubbing, or edema  patient feels some swelling of the feet        Assessment:       1. Closed nondisplaced fracture of head of right radius with routine healing, subsequent encounter    2. Siu cyst, right    3. Chronic pain of both knees    4. Chronic left shoulder pain    5. Left arm pain    6. Elevated rheumatoid factor    7. Ischemic cardiomyopathy    8. AICD (automatic cardioverter/defibrillator) present    9. S/P PTCA (percutaneous transluminal coronary angioplasty)    10. Old MI (myocardial infarction)    11. Congestive heart failure, unspecified HF chronicity, unspecified heart failure type    12. Overweight (BMI 25.0-29.9)        Plan:       Closed nondisplaced fracture of head of right radius with routine healing, subsequent encounter    Baker cyst, right    Chronic pain of both knees    Chronic left shoulder pain    Left arm pain    Elevated rheumatoid factor    Ischemic cardiomyopathy    AICD (automatic cardioverter/defibrillator) present    S/P PTCA (percutaneous transluminal coronary angioplasty)    Old MI (myocardial infarction)    Congestive heart failure, unspecified HF chronicity, unspecified heart failure type    Overweight (BMI 25.0-29.9)      history right fractured distal radial head--has cast--F/U Dr Caleb Ross or Dr Young --hand surgeons--evaluate swelling of the left 3rd digit and pain palmar surface left hand 3rd tendon if push 0 1 tendon or patient flexes are makes hand grasps has pain radiating up the left forearm to left upper arm to  the shoulder  Dr. Ellis--evaluate bilateral knee pain especially in the popliteal areas bilaterally with a Siu cyst in the right popliteal area, left shoulder pain left forearm pain left hand pain especially 3rd digit and Mccoy tendon-3rd --may benefit from MRI of the knees to evaluate if Baker cyst needs to be drained removed  Norco/ibuprofen/Flexeril--no recent to follow up with me  History rheumatoid factor elevated 519 patient referred to rheumatologist

## 2019-04-15 ENCOUNTER — OFFICE VISIT (OUTPATIENT)
Dept: CARDIOLOGY | Facility: CLINIC | Age: 64
End: 2019-04-15
Payer: MEDICARE

## 2019-04-15 ENCOUNTER — TELEPHONE (OUTPATIENT)
Dept: CARDIOLOGY | Facility: CLINIC | Age: 64
End: 2019-04-15

## 2019-04-15 ENCOUNTER — OFFICE VISIT (OUTPATIENT)
Dept: ORTHOPEDICS | Facility: CLINIC | Age: 64
End: 2019-04-15
Payer: MEDICARE

## 2019-04-15 VITALS
HEART RATE: 59 BPM | SYSTOLIC BLOOD PRESSURE: 109 MMHG | BODY MASS INDEX: 26.45 KG/M2 | HEIGHT: 63 IN | DIASTOLIC BLOOD PRESSURE: 62 MMHG | WEIGHT: 149.25 LBS

## 2019-04-15 VITALS
DIASTOLIC BLOOD PRESSURE: 62 MMHG | HEART RATE: 59 BPM | SYSTOLIC BLOOD PRESSURE: 109 MMHG | BODY MASS INDEX: 26.44 KG/M2 | WEIGHT: 149.25 LBS

## 2019-04-15 DIAGNOSIS — I50.42 CHRONIC COMBINED SYSTOLIC AND DIASTOLIC CONGESTIVE HEART FAILURE: ICD-10-CM

## 2019-04-15 DIAGNOSIS — Z95.810 AICD (AUTOMATIC CARDIOVERTER/DEFIBRILLATOR) PRESENT: ICD-10-CM

## 2019-04-15 DIAGNOSIS — Z98.61 S/P PTCA (PERCUTANEOUS TRANSLUMINAL CORONARY ANGIOPLASTY): ICD-10-CM

## 2019-04-15 DIAGNOSIS — S52.531D CLOSED COLLES' FRACTURE OF RIGHT RADIUS WITH ROUTINE HEALING, SUBSEQUENT ENCOUNTER: Primary | ICD-10-CM

## 2019-04-15 DIAGNOSIS — I25.2 OLD MI (MYOCARDIAL INFARCTION): ICD-10-CM

## 2019-04-15 DIAGNOSIS — I45.2 RBBB WITH LEFT ANTERIOR FASCICULAR BLOCK: ICD-10-CM

## 2019-04-15 DIAGNOSIS — I25.5 ISCHEMIC CARDIOMYOPATHY: Primary | ICD-10-CM

## 2019-04-15 PROCEDURE — 3008F PR BODY MASS INDEX (BMI) DOCUMENTED: ICD-10-PCS | Mod: CPTII,S$GLB,, | Performed by: ORTHOPAEDIC SURGERY

## 2019-04-15 PROCEDURE — 99214 PR OFFICE/OUTPT VISIT, EST, LEVL IV, 30-39 MIN: ICD-10-PCS | Mod: 25,S$GLB,, | Performed by: INTERNAL MEDICINE

## 2019-04-15 PROCEDURE — 3074F PR MOST RECENT SYSTOLIC BLOOD PRESSURE < 130 MM HG: ICD-10-PCS | Mod: CPTII,S$GLB,, | Performed by: ORTHOPAEDIC SURGERY

## 2019-04-15 PROCEDURE — 3078F PR MOST RECENT DIASTOLIC BLOOD PRESSURE < 80 MM HG: ICD-10-PCS | Mod: CPTII,S$GLB,, | Performed by: ORTHOPAEDIC SURGERY

## 2019-04-15 PROCEDURE — 3008F BODY MASS INDEX DOCD: CPT | Mod: CPTII,S$GLB,, | Performed by: ORTHOPAEDIC SURGERY

## 2019-04-15 PROCEDURE — 3008F BODY MASS INDEX DOCD: CPT | Mod: CPTII,S$GLB,, | Performed by: INTERNAL MEDICINE

## 2019-04-15 PROCEDURE — 3074F SYST BP LT 130 MM HG: CPT | Mod: CPTII,S$GLB,, | Performed by: ORTHOPAEDIC SURGERY

## 2019-04-15 PROCEDURE — 93283 PR PROGRAM EVAL (IN PERSON) IMPLANT DEVICE,CARDVERT/DEFIB,2 LEAD: ICD-10-PCS | Mod: 26,,, | Performed by: INTERNAL MEDICINE

## 2019-04-15 PROCEDURE — 99999 PR PBB SHADOW E&M-EST. PATIENT-LVL II: ICD-10-PCS | Mod: PBBFAC,,, | Performed by: INTERNAL MEDICINE

## 2019-04-15 PROCEDURE — 99213 OFFICE O/P EST LOW 20 MIN: CPT | Mod: S$GLB,,, | Performed by: ORTHOPAEDIC SURGERY

## 2019-04-15 PROCEDURE — 99213 PR OFFICE/OUTPT VISIT, EST, LEVL III, 20-29 MIN: ICD-10-PCS | Mod: S$GLB,,, | Performed by: ORTHOPAEDIC SURGERY

## 2019-04-15 PROCEDURE — 99999 PR PBB SHADOW E&M-EST. PATIENT-LVL II: CPT | Mod: PBBFAC,,, | Performed by: INTERNAL MEDICINE

## 2019-04-15 PROCEDURE — 99999 PR PBB SHADOW E&M-EST. PATIENT-LVL III: ICD-10-PCS | Mod: PBBFAC,,, | Performed by: ORTHOPAEDIC SURGERY

## 2019-04-15 PROCEDURE — 99999 PR PBB SHADOW E&M-EST. PATIENT-LVL III: CPT | Mod: PBBFAC,,, | Performed by: ORTHOPAEDIC SURGERY

## 2019-04-15 PROCEDURE — 3078F DIAST BP <80 MM HG: CPT | Mod: CPTII,S$GLB,, | Performed by: ORTHOPAEDIC SURGERY

## 2019-04-15 PROCEDURE — 3074F SYST BP LT 130 MM HG: CPT | Mod: CPTII,S$GLB,, | Performed by: INTERNAL MEDICINE

## 2019-04-15 PROCEDURE — 3008F PR BODY MASS INDEX (BMI) DOCUMENTED: ICD-10-PCS | Mod: CPTII,S$GLB,, | Performed by: INTERNAL MEDICINE

## 2019-04-15 PROCEDURE — 3074F PR MOST RECENT SYSTOLIC BLOOD PRESSURE < 130 MM HG: ICD-10-PCS | Mod: CPTII,S$GLB,, | Performed by: INTERNAL MEDICINE

## 2019-04-15 PROCEDURE — 99214 OFFICE O/P EST MOD 30 MIN: CPT | Mod: 25,S$GLB,, | Performed by: INTERNAL MEDICINE

## 2019-04-15 PROCEDURE — 3078F PR MOST RECENT DIASTOLIC BLOOD PRESSURE < 80 MM HG: ICD-10-PCS | Mod: CPTII,S$GLB,, | Performed by: INTERNAL MEDICINE

## 2019-04-15 PROCEDURE — 93283 PRGRMG EVAL IMPLANTABLE DFB: CPT | Mod: 26,,, | Performed by: INTERNAL MEDICINE

## 2019-04-15 PROCEDURE — 3078F DIAST BP <80 MM HG: CPT | Mod: CPTII,S$GLB,, | Performed by: INTERNAL MEDICINE

## 2019-04-15 NOTE — PATIENT INSTRUCTIONS
Ms. Hart's X-ray shows good healing and the cast was removed.She is advised to attend physical therapy and to return in 1 month.

## 2019-04-15 NOTE — PROGRESS NOTES
"  Subjective:      Patient ID: Odette Hart is a 63 y.o. female.    Chief Complaint: Follow-up (Device check )    HPI:  Slipped on something on the floor and broke right wrist 3/8/19.    Pt has positional soreness of left upper extremity which she attributes to overuse.    Pt has pain in right knee which pt attributes to Baker's cyst    Review of Systems   Cardiovascular: Negative for chest pain, claudication, dyspnea on exertion, irregular heartbeat, leg swelling, near-syncope, orthopnea, palpitations and syncope.      No cigarettes for 4 years.    "I suffer from anxiety" which "sometimes gives me a rash"    Quit taking Zetia because "I forget" but pt is now back on it daily.  Past Medical History:   Diagnosis Date    Acute coronary syndrome     Allergy     Arthritis     Cardiomyopathy     CHF (congestive heart failure)     Coronary artery disease     Diverticulitis     Diverticulosis     Heart attack     Heart disease     Hyperlipidemia     Hypertension     ICD (implantable cardioverter-defibrillator) in place         Past Surgical History:   Procedure Laterality Date    ATRIAL CARDIAC PACEMAKER INSERTION       SECTION      CORONARY STENT PLACEMENT         Family History   Problem Relation Age of Onset    Heart disease Mother     Emphysema Father     Heart attack Brother        Social History     Socioeconomic History    Marital status:      Spouse name: Not on file    Number of children: Not on file    Years of education: Not on file    Highest education level: Not on file   Occupational History    Not on file   Social Needs    Financial resource strain: Not on file    Food insecurity:     Worry: Not on file     Inability: Not on file    Transportation needs:     Medical: Not on file     Non-medical: Not on file   Tobacco Use    Smoking status: Former Smoker     Packs/day: 0.50     Start date: 1976     Last attempt to quit: 2012     Years since quitting: " 6.3    Smokeless tobacco: Never Used   Substance and Sexual Activity    Alcohol use: No    Drug use: No    Sexual activity: Not on file   Lifestyle    Physical activity:     Days per week: Not on file     Minutes per session: Not on file    Stress: Not on file   Relationships    Social connections:     Talks on phone: Not on file     Gets together: Not on file     Attends Scientologist service: Not on file     Active member of club or organization: Not on file     Attends meetings of clubs or organizations: Not on file     Relationship status: Not on file   Other Topics Concern    Not on file   Social History Narrative    Not on file       Current Outpatient Medications on File Prior to Visit   Medication Sig Dispense Refill    albuterol (PROVENTIL/VENTOLIN HFA) 90 mcg/actuation inhaler Inhale 2 puffs into the lungs every 6 (six) hours as needed for Wheezing. Rescue 18 g 1    ALPRAZolam (XANAX) 0.5 MG tablet Take 1 tablet (0.5 mg total) by mouth nightly as needed. 30 tablet 3    aspirin (ECOTRIN) 81 MG EC tablet Take 81 mg by mouth once daily.      betamethasone valerate 0.1% (VALISONE) 0.1 % Crea Apply topically 2 (two) times daily. Apply to rash b.i.d. or p.r.n. itch do not apply to the face 60 g 2    carvedilol (COREG) 12.5 MG tablet Take 1 tablet (12.5 mg total) by mouth 2 (two) times daily. 180 tablet 3    citalopram (CELEXA) 20 MG tablet Take 1 tablet (20 mg total) by mouth once daily. 90 tablet 3    ezetimibe (ZETIA) 10 mg tablet Take 1 tablet (10 mg total) by mouth once daily. 90 tablet 3    HYDROcodone-acetaminophen (NORCO) 5-325 mg per tablet Take 1 tablet by mouth every 6 (six) hours as needed. 30 tablet 0    ibuprofen (ADVIL,MOTRIN) 600 MG tablet Take 1 tablet (600 mg total) by mouth every 8 (eight) hours as needed for Pain. 30 tablet 5    omeprazole (PRILOSEC) 40 MG capsule Take 1 capsule (40 mg total) by mouth once daily. 30 capsule 5    [DISCONTINUED] methocarbamol (ROBAXIN) 500 MG Tab  "Take 1 tablet (500 mg total) by mouth 4 (four) times daily. for 10 days 40 tablet 0    [DISCONTINUED] ondansetron (ZOFRAN-ODT) 4 MG TbDL Take 1 tablet (4 mg total) by mouth every 6 (six) hours as needed. 20 tablet 0     No current facility-administered medications on file prior to visit.        Review of patient's allergies indicates:   Allergen Reactions    Augmentin [amoxicillin-pot clavulanate] Swelling    Lisinopril Other (See Comments)     Fluid around heart    Losartan Other (See Comments)     High potassium    Shellfish containing products Anaphylaxis     Pt.states she is allergic to SEAFOOD since the age of 13    Statins-hmg-coa reductase inhibitors Other (See Comments)     Chest pains    Levaquin [levofloxacin] Other (See Comments)     Very bad joint pain    Clindamycin Palpitations     Chest pain     Objective:     Vitals:    04/15/19 0907   BP: 109/62   BP Location: Right arm   Patient Position: Sitting   BP Method: Large (Automatic)   Pulse: (!) 59   Weight: 67.7 kg (149 lb 4 oz)   Height: 5' 3" (1.6 m)        Physical Exam   Constitutional: She is oriented to person, place, and time. She appears well-developed and well-nourished.   Eyes: No scleral icterus.   Neck: No JVD present. Carotid bruit is not present.   Cardiovascular: Normal rate and regular rhythm. Exam reveals no gallop.   No murmur heard.  Pulmonary/Chest: Breath sounds normal.   Musculoskeletal: She exhibits no edema.   Neurological: She is alert and oriented to person, place, and time.   Skin: Skin is warm and dry.   Psychiatric: She has a normal mood and affect. Her behavior is normal. Judgment and thought content normal.   Vitals reviewed.     AICD interrogated in office with tech from [employer]--JOSE 8 years.   No events.  Leads OK    Wt down 2 lbs    Reviewed most recent CXR and BNP with pt  Assessment:     1. Ischemic cardiomyopathy    2. AICD (automatic cardioverter/defibrillator) present    3. S/P PTCA (percutaneous " transluminal coronary angioplasty)    4. Old MI (myocardial infarction)    5. Chronic combined systolic and diastolic congestive heart failure    6. RBBB with left anterior fascicular block      Plan:   Odette was seen today for follow-up.    Diagnoses and all orders for this visit:    Ischemic cardiomyopathy    AICD (automatic cardioverter/defibrillator) present    S/P PTCA (percutaneous transluminal coronary angioplasty)    Old MI (myocardial infarction)    Chronic combined systolic and diastolic congestive heart failure    RBBB with left anterior fascicular block         Pt cautioned about risk of fluid retention and CHF when she takes NSAID's     Restrict fluid and salt    Medterranean diet reviewed with pt    Pt again declines another trial of ARB and declines Entresto    Check lipids now that pt is taking Entresto    Pt declines Lexiscan Cardiolite stress test since she does not want an injection    Stress echo would be problematic due to old MI and bifascicular block    Pt set up with a new [employer] remote AICD monitor    Follow up in about 6 months (around 10/15/2019), or with [employer].

## 2019-04-15 NOTE — PROGRESS NOTES
Subjective:      Patient ID: Odette Hart is a 63 y.o. female.    Chief Complaint: Follow-up      HPI: Mrs. Hart is feeling better. Has minimal pain in right wrist.    Past Medical History:   Diagnosis Date    Acute coronary syndrome     Allergy     Arthritis     Cardiomyopathy     CHF (congestive heart failure)     Coronary artery disease     Diverticulitis     Diverticulosis     Heart attack     Heart disease     Hyperlipidemia     Hypertension     ICD (implantable cardioverter-defibrillator) in place      Past Surgical History:   Procedure Laterality Date    ATRIAL CARDIAC PACEMAKER INSERTION       SECTION      CORONARY STENT PLACEMENT       Social History     Socioeconomic History    Marital status:      Spouse name: Not on file    Number of children: Not on file    Years of education: Not on file    Highest education level: Not on file   Occupational History    Not on file   Social Needs    Financial resource strain: Not on file    Food insecurity:     Worry: Not on file     Inability: Not on file    Transportation needs:     Medical: Not on file     Non-medical: Not on file   Tobacco Use    Smoking status: Former Smoker     Packs/day: 0.50     Start date: 1976     Last attempt to quit: 2012     Years since quittin.3    Smokeless tobacco: Never Used   Substance and Sexual Activity    Alcohol use: No    Drug use: No    Sexual activity: Not on file   Lifestyle    Physical activity:     Days per week: Not on file     Minutes per session: Not on file    Stress: Not on file   Relationships    Social connections:     Talks on phone: Not on file     Gets together: Not on file     Attends [employer] service: Not on file     Active member of club or organization: Not on file     Attends meetings of clubs or organizations: Not on file     Relationship status: Not on file   Other Topics Concern    Not on file   Social History Narrative    Not on file          Current Outpatient Medications:     albuterol (PROVENTIL/VENTOLIN HFA) 90 mcg/actuation inhaler, Inhale 2 puffs into the lungs every 6 (six) hours as needed for Wheezing. Rescue, Disp: 18 g, Rfl: 1    ALPRAZolam (XANAX) 0.5 MG tablet, Take 1 tablet (0.5 mg total) by mouth nightly as needed., Disp: 30 tablet, Rfl: 3    aspirin (ECOTRIN) 81 MG EC tablet, Take 81 mg by mouth once daily., Disp: , Rfl:     betamethasone valerate 0.1% (VALISONE) 0.1 % Crea, Apply topically 2 (two) times daily. Apply to rash b.i.d. or p.r.n. itch do not apply to the face, Disp: 60 g, Rfl: 2    carvedilol (COREG) 12.5 MG tablet, Take 1 tablet (12.5 mg total) by mouth 2 (two) times daily., Disp: 180 tablet, Rfl: 3    citalopram (CELEXA) 20 MG tablet, Take 1 tablet (20 mg total) by mouth once daily., Disp: 90 tablet, Rfl: 3    ezetimibe (ZETIA) 10 mg tablet, Take 1 tablet (10 mg total) by mouth once daily., Disp: 90 tablet, Rfl: 3    HYDROcodone-acetaminophen (NORCO) 5-325 mg per tablet, Take 1 tablet by mouth every 6 (six) hours as needed., Disp: 30 tablet, Rfl: 0    ibuprofen (ADVIL,MOTRIN) 600 MG tablet, Take 1 tablet (600 mg total) by mouth every 8 (eight) hours as needed for Pain., Disp: 30 tablet, Rfl: 5    omeprazole (PRILOSEC) 40 MG capsule, Take 1 capsule (40 mg total) by mouth once daily., Disp: 30 capsule, Rfl: 5  Review of patient's allergies indicates:   Allergen Reactions    Augmentin [amoxicillin-pot clavulanate] Swelling    Lisinopril Other (See Comments)     Fluid around heart    Losartan Other (See Comments)     High potassium    Shellfish containing products Anaphylaxis     Pt.states she is allergic to SEAFOOD since the age of 13    Statins-hmg-coa reductase inhibitors Other (See Comments)     Chest pains    Levaquin [levofloxacin] Other (See Comments)     Very bad joint pain    Clindamycin Palpitations     Chest pain       /62   Pulse (!) 59   Wt 67.7 kg (149 lb 4 oz)   LMP  (LMP Unknown)    BMI 26.44 kg/m²     Review of Systems   Constitution: Negative.   HENT: Negative.    Eyes: Negative.    Cardiovascular: Negative.    Respiratory: Negative.    Endocrine: Negative.    Skin: Negative.    Musculoskeletal: Positive for joint pain, joint swelling, muscle cramps, myalgias, neck pain and stiffness. Negative for arthritis, back pain, falls, gout and muscle weakness.         Objective:    Right Hand Exam     Tenderness   The patient is experiencing tenderness in the dorsal area.    Range of Motion   Wrist   Extension: abnormal   Flexion: abnormal   Pronation: abnormal   Supination: abnormal     Muscle Strength   Wrist extension: 4/5   Wrist flexion: 4/5   : 4/5     Tests   Phalens Sign: negative  Tinel's sign (median nerve): negative  Finkelstein's test: negative    Other   Erythema: absent  Scars: absent  Sensation: normal  Pulse: present    Comments:  Ms. Hart's Cast was removed. Has mild stiffness of wrist. Her X-ray shows good healing of the fracture.      Left Hand Exam   Left hand exam is normal.               Assessment:     Imagin. Closed Colles' fracture of right radius with routine healing, subsequent encounter          Plan:          No follow-ups on file.

## 2019-04-15 NOTE — TELEPHONE ENCOUNTER
Records received from Dr Beltran.  Patient will come by clinic to sign a THOM for doctor in [place].     ----- Message from Efrain Maravilla sent at 4/15/2019  1:03 PM CDT -----  Type: Needs Medical Advice    Who Called:  Patient    Best Call Back Number: [phone]  Additional Information: Patient states that she would like to know if Dr. Arenas's office had received the medical records from Dr. Beltran and [place].  Please call to advise

## 2019-04-29 PROBLEM — S52.531D CLOSED COLLES' FRACTURE OF RIGHT RADIUS WITH ROUTINE HEALING: Status: ACTIVE | Noted: 2019-04-29

## 2019-06-18 ENCOUNTER — TELEPHONE (OUTPATIENT)
Dept: ORTHOPEDICS | Facility: CLINIC | Age: 64
End: 2019-06-18

## 2019-06-18 NOTE — TELEPHONE ENCOUNTER
Have previously been in contact regarding an appointment with orthopedics. I tried calling again today, no answer, LVM. Will try again.

## 2019-06-18 NOTE — TELEPHONE ENCOUNTER
----- Message from Cristina Boyce sent at 6/11/2019  9:37 AM CDT -----  Regarding: Ortho patient  Contact: [phone]  Simone Bernardo,  Ms Hart called our office trying to get in touch with you, She is a patient of Dr Ellis and said she had a message from you but when she tried calling your Office back the person on the phone said they didn't see any messages in the system so didn't know how to help her. She requested that you please call her back.   Thank You,   Cristina RANDOLPH

## 2019-06-18 NOTE — TELEPHONE ENCOUNTER
Spoke with patient. She is still attending PT sessions. She would like to f/u with an orthopedic and have an x-ray completed. I offered an appointment with Dr. Padilla, but she stated she prefers to see an orthopedic physician she's seen in the past. She stated she will call them and make an appointment when she is ready. I instructed her to call with any questions or concerns. Patient verbalized understanding; all questions answered; no other issues discussed.

## 2019-06-25 PROBLEM — S52.531D CLOSED COLLES' FRACTURE OF RIGHT RADIUS WITH ROUTINE HEALING: Status: RESOLVED | Noted: 2019-04-29 | Resolved: 2019-06-25

## 2019-08-27 ENCOUNTER — CLINICAL SUPPORT (OUTPATIENT)
Dept: CARDIOLOGY | Facility: CLINIC | Age: 64
End: 2019-08-27
Payer: MEDICARE

## 2019-08-27 DIAGNOSIS — Z95.810 AICD (AUTOMATIC CARDIOVERTER/DEFIBRILLATOR) PRESENT: Primary | ICD-10-CM

## 2019-08-27 PROCEDURE — 99499 RISK ADDL DX/OHS AUDIT: ICD-10-PCS | Mod: S$GLB,,, | Performed by: INTERNAL MEDICINE

## 2019-08-27 PROCEDURE — 93296 PR INTERROG EVAL, REMOTE, UP TO 90 DAYS, PACER/DEFIB,TECH REVIEW: ICD-10-PCS | Mod: S$GLB,,, | Performed by: INTERNAL MEDICINE

## 2019-08-27 PROCEDURE — 93296 REM INTERROG EVL PM/IDS: CPT | Mod: S$GLB,,, | Performed by: INTERNAL MEDICINE

## 2019-08-27 PROCEDURE — 99499 UNLISTED E&M SERVICE: CPT | Mod: S$GLB,,, | Performed by: INTERNAL MEDICINE

## 2019-08-27 PROCEDURE — 93295 DEV INTERROG REMOTE 1/2/MLT: CPT | Mod: S$GLB,,, | Performed by: INTERNAL MEDICINE

## 2019-08-27 PROCEDURE — 93295 PR INTERROG EVAL, REMOTE, UP TO 90 DAYS,CARDVERT/DEFIB: ICD-10-PCS | Mod: S$GLB,,, | Performed by: INTERNAL MEDICINE

## 2019-08-27 NOTE — PROGRESS NOTES
Subjective:      Patient ID: Odette Hart is a 63 y.o. female.    Chief Complaint: No chief complaint on file.    HPI:    [employer] remote interrogation of AICD report downloaded and prepared by medical assistant and interpreted by me and full report scanned into Epic media.  Battery OK.  JOSE 7.5 years.  Leads OK.  30% atrial pacing.  0% ventricular pacing.  One non-treated VT noted. No a fib.  Normal device function.    Past Medical History:   Diagnosis Date    Acute coronary syndrome     Allergy     Arthritis     Cardiomyopathy     CHF (congestive heart failure)     Coronary artery disease     Diverticulitis     Diverticulosis     Heart attack     Heart disease     Hyperlipidemia     Hypertension     ICD (implantable cardioverter-defibrillator) in place         Past Surgical History:   Procedure Laterality Date    ATRIAL CARDIAC PACEMAKER INSERTION       SECTION      CORONARY STENT PLACEMENT         Family History   Problem Relation Age of Onset    Heart disease Mother     Emphysema Father     Heart attack Brother        Social History     Socioeconomic History    Marital status:      Spouse name: Not on file    Number of children: Not on file    Years of education: Not on file    Highest education level: Not on file   Occupational History    Not on file   Social Needs    Financial resource strain: Not on file    Food insecurity:     Worry: Not on file     Inability: Not on file    Transportation needs:     Medical: Not on file     Non-medical: Not on file   Tobacco Use    Smoking status: Former Smoker     Packs/day: 0.50     Start date: 1976     Last attempt to quit: 2012     Years since quittin.7    Smokeless tobacco: Never Used   Substance and Sexual Activity    Alcohol use: No    Drug use: No    Sexual activity: Not on file   Lifestyle    Physical activity:     Days per week: Not on file     Minutes per session: Not on file     Stress: Not on file   Relationships    Social connections:     Talks on phone: Not on file     Gets together: Not on file     Attends Latter-day service: Not on file     Active member of club or organization: Not on file     Attends meetings of clubs or organizations: Not on file     Relationship status: Not on file   Other Topics Concern    Not on file   Social History Narrative    Not on file       Current Outpatient Medications on File Prior to Visit   Medication Sig Dispense Refill    albuterol (PROVENTIL/VENTOLIN HFA) 90 mcg/actuation inhaler Inhale 2 puffs into the lungs every 6 (six) hours as needed for Wheezing. Rescue 18 g 1    ALPRAZolam (XANAX) 0.5 MG tablet Take 1 tablet (0.5 mg total) by mouth nightly as needed. 30 tablet 3    aspirin (ECOTRIN) 81 MG EC tablet Take 81 mg by mouth once daily.      betamethasone valerate 0.1% (VALISONE) 0.1 % Crea Apply topically 2 (two) times daily. Apply to rash b.i.d. or p.r.n. itch do not apply to the face 60 g 2    carvedilol (COREG) 12.5 MG tablet Take 1 tablet (12.5 mg total) by mouth 2 (two) times daily. 180 tablet 3    citalopram (CELEXA) 20 MG tablet Take 1 tablet (20 mg total) by mouth once daily. 90 tablet 3    ezetimibe (ZETIA) 10 mg tablet Take 1 tablet (10 mg total) by mouth once daily. 90 tablet 3    HYDROcodone-acetaminophen (NORCO) 5-325 mg per tablet Take 1 tablet by mouth every 6 (six) hours as needed. 30 tablet 0    ibuprofen (ADVIL,MOTRIN) 600 MG tablet Take 1 tablet (600 mg total) by mouth every 8 (eight) hours as needed for Pain. 30 tablet 5    omeprazole (PRILOSEC) 40 MG capsule Take 1 capsule (40 mg total) by mouth once daily. 30 capsule 5     No current facility-administered medications on file prior to visit.        Review of patient's allergies indicates:   Allergen Reactions    Augmentin [amoxicillin-pot clavulanate] Swelling    Lisinopril Other (See Comments)     Fluid around heart    Losartan Other (See Comments)     High  potassium    Shellfish containing products Anaphylaxis     Pt.states she is allergic to SEAFOOD since the age of 13    Statins-hmg-coa reductase inhibitors Other (See Comments)     Chest pains    Levaquin [levofloxacin] Other (See Comments)     Very bad joint pain    Clindamycin Palpitations     Chest pain     Objective:   There were no vitals filed for this visit.     Physical Exam     Assessment:     1. AICD (automatic cardioverter/defibrillator) present      Plan:   Diagnoses and all orders for this visit:    AICD (automatic cardioverter/defibrillator) present         No follow-ups on file.

## 2019-09-09 ENCOUNTER — OFFICE VISIT (OUTPATIENT)
Dept: PRIMARY CARE CLINIC | Facility: CLINIC | Age: 64
End: 2019-09-09
Payer: MEDICARE

## 2019-09-09 VITALS
SYSTOLIC BLOOD PRESSURE: 129 MMHG | HEIGHT: 63 IN | TEMPERATURE: 98 F | OXYGEN SATURATION: 97 % | RESPIRATION RATE: 18 BRPM | WEIGHT: 145.19 LBS | HEART RATE: 60 BPM | BODY MASS INDEX: 25.73 KG/M2 | DIASTOLIC BLOOD PRESSURE: 74 MMHG

## 2019-09-09 DIAGNOSIS — S52.124D CLOSED NONDISPLACED FRACTURE OF HEAD OF RIGHT RADIUS WITH ROUTINE HEALING, SUBSEQUENT ENCOUNTER: Primary | ICD-10-CM

## 2019-09-09 DIAGNOSIS — Z98.61 S/P PTCA (PERCUTANEOUS TRANSLUMINAL CORONARY ANGIOPLASTY): ICD-10-CM

## 2019-09-09 DIAGNOSIS — Z00.00 ROUTINE MEDICAL EXAM: ICD-10-CM

## 2019-09-09 DIAGNOSIS — F41.9 ANXIETY: ICD-10-CM

## 2019-09-09 DIAGNOSIS — Z13.6 ENCOUNTER FOR SCREENING FOR CARDIOVASCULAR DISORDERS: ICD-10-CM

## 2019-09-09 DIAGNOSIS — I50.30 CONGESTIVE HEART FAILURE WITH LEFT VENTRICULAR DIASTOLIC DYSFUNCTION, NYHA CLASS 2: ICD-10-CM

## 2019-09-09 DIAGNOSIS — J40 BRONCHITIS: ICD-10-CM

## 2019-09-09 DIAGNOSIS — I25.2 OLD MI (MYOCARDIAL INFARCTION): ICD-10-CM

## 2019-09-09 DIAGNOSIS — R76.8 ELEVATED RHEUMATOID FACTOR: ICD-10-CM

## 2019-09-09 DIAGNOSIS — E66.3 OVERWEIGHT (BMI 25.0-29.9): ICD-10-CM

## 2019-09-09 PROCEDURE — 3074F PR MOST RECENT SYSTOLIC BLOOD PRESSURE < 130 MM HG: ICD-10-PCS | Mod: CPTII,S$GLB,, | Performed by: INTERNAL MEDICINE

## 2019-09-09 PROCEDURE — 99214 OFFICE O/P EST MOD 30 MIN: CPT | Mod: 25,S$GLB,, | Performed by: INTERNAL MEDICINE

## 2019-09-09 PROCEDURE — 3078F PR MOST RECENT DIASTOLIC BLOOD PRESSURE < 80 MM HG: ICD-10-PCS | Mod: CPTII,S$GLB,, | Performed by: INTERNAL MEDICINE

## 2019-09-09 PROCEDURE — 81002 POCT URINE DIPSTICK WITHOUT MICROSCOPE: ICD-10-PCS | Mod: S$GLB,,, | Performed by: INTERNAL MEDICINE

## 2019-09-09 PROCEDURE — 3008F PR BODY MASS INDEX (BMI) DOCUMENTED: ICD-10-PCS | Mod: CPTII,S$GLB,, | Performed by: INTERNAL MEDICINE

## 2019-09-09 PROCEDURE — 3008F BODY MASS INDEX DOCD: CPT | Mod: CPTII,S$GLB,, | Performed by: INTERNAL MEDICINE

## 2019-09-09 PROCEDURE — 99499 UNLISTED E&M SERVICE: CPT | Mod: S$GLB,,, | Performed by: INTERNAL MEDICINE

## 2019-09-09 PROCEDURE — 3078F DIAST BP <80 MM HG: CPT | Mod: CPTII,S$GLB,, | Performed by: INTERNAL MEDICINE

## 2019-09-09 PROCEDURE — 99499 RISK ADDL DX/OHS AUDIT: ICD-10-PCS | Mod: S$GLB,,, | Performed by: INTERNAL MEDICINE

## 2019-09-09 PROCEDURE — 99999 PR PBB SHADOW E&M-EST. PATIENT-LVL IV: CPT | Mod: PBBFAC,,, | Performed by: INTERNAL MEDICINE

## 2019-09-09 PROCEDURE — 99214 PR OFFICE/OUTPT VISIT, EST, LEVL IV, 30-39 MIN: ICD-10-PCS | Mod: 25,S$GLB,, | Performed by: INTERNAL MEDICINE

## 2019-09-09 PROCEDURE — 99999 PR PBB SHADOW E&M-EST. PATIENT-LVL IV: ICD-10-PCS | Mod: PBBFAC,,, | Performed by: INTERNAL MEDICINE

## 2019-09-09 PROCEDURE — 81002 URINALYSIS NONAUTO W/O SCOPE: CPT | Mod: S$GLB,,, | Performed by: INTERNAL MEDICINE

## 2019-09-09 PROCEDURE — 3074F SYST BP LT 130 MM HG: CPT | Mod: CPTII,S$GLB,, | Performed by: INTERNAL MEDICINE

## 2019-09-09 RX ORDER — CARVEDILOL 12.5 MG/1
12.5 TABLET ORAL 2 TIMES DAILY
Qty: 180 TABLET | Refills: 3 | Status: SHIPPED | OUTPATIENT
Start: 2019-09-09 | End: 2019-09-09 | Stop reason: SDUPTHER

## 2019-09-09 RX ORDER — ALPRAZOLAM 0.5 MG/1
0.5 TABLET ORAL NIGHTLY PRN
Qty: 30 TABLET | Refills: 0 | Status: SHIPPED | OUTPATIENT
Start: 2019-09-09 | End: 2019-10-30 | Stop reason: SDUPTHER

## 2019-09-09 RX ORDER — ALBUTEROL SULFATE 90 UG/1
2 AEROSOL, METERED RESPIRATORY (INHALATION) EVERY 6 HOURS PRN
Qty: 18 G | Refills: 1 | Status: SHIPPED | OUTPATIENT
Start: 2019-09-09 | End: 2020-01-06 | Stop reason: SDUPTHER

## 2019-09-09 RX ORDER — OMEPRAZOLE 40 MG/1
40 CAPSULE, DELAYED RELEASE ORAL DAILY
Qty: 30 CAPSULE | Refills: 5 | Status: SHIPPED | OUTPATIENT
Start: 2019-09-09 | End: 2020-03-17

## 2019-09-09 RX ORDER — CITALOPRAM 20 MG/1
20 TABLET, FILM COATED ORAL DAILY
Qty: 90 TABLET | Refills: 3 | Status: SHIPPED | OUTPATIENT
Start: 2019-09-09 | End: 2020-06-14 | Stop reason: SDUPTHER

## 2019-09-09 RX ORDER — PREDNISONE 20 MG/1
20 TABLET ORAL 2 TIMES DAILY
Qty: 10 TABLET | Refills: 0 | Status: SHIPPED | OUTPATIENT
Start: 2019-09-09 | End: 2019-09-14

## 2019-09-09 RX ORDER — CARVEDILOL 12.5 MG/1
12.5 TABLET ORAL 2 TIMES DAILY
Qty: 180 TABLET | Refills: 3 | Status: SHIPPED | OUTPATIENT
Start: 2019-09-09 | End: 2020-06-14 | Stop reason: SDUPTHER

## 2019-09-09 NOTE — PROGRESS NOTES
Subjective:       Patient ID: Odette Hart is a 63 y.o. female.    Chief Complaint: Wrist Pain (right wrist) and Medication Refill    HPI  patient here with complaint her right wrist still hurts see fell in May of this year landed on the right wrist suffer with distal radius fracture was in cast for 6 weeks by orthopedist who not practicing anymore here patient had rehab has been operate cast but her right wrist still hurt mainly medial at the distal ulna and of 5th metacarpal and also at the base of the right thumb no swelling no erythema right wrist hurt more when even tim denies short of breath chest pain dyspnea with exertion see has a questionable history of rheumatoid arthritis supposed to see rheumatologist but not able to make appointment yet deny any other joint pain has chronic anxiety insomnia took stand neck low dose p.r.n. request refill denies depression or suicidal ideation  Review of Systems    Objective:      Physical Exam   Constitutional: She is oriented to person, place, and time. She appears well-developed and well-nourished. No distress.   HENT:   Head: Normocephalic and atraumatic.   Right Ear: External ear normal.   Left Ear: External ear normal.   Nose: Nose normal.   Mouth/Throat: Oropharynx is clear and moist. No oropharyngeal exudate.   Eyes: Pupils are equal, round, and reactive to light. Conjunctivae and EOM are normal. Right eye exhibits no discharge. Left eye exhibits no discharge.   Neck: Normal range of motion. Neck supple. No thyromegaly present.   Cardiovascular: Normal rate, regular rhythm, normal heart sounds and intact distal pulses. Exam reveals no gallop and no friction rub.   No murmur heard.  Pulmonary/Chest: Effort normal and breath sounds normal. No respiratory distress. She has no wheezes. She has no rales. She exhibits no tenderness.   Abdominal: Soft. Bowel sounds are normal. She exhibits no distension. There is no tenderness. There is no rebound and no  guarding.   Musculoskeletal: Normal range of motion. She exhibits tenderness (Tenderness with palpation at the medial wrist around the styloid process and the base of the 5th metatarcarpal no swelling or erythema also tenderness at the base of the 1st metacarpal). She exhibits no edema or deformity.   Lymphadenopathy:     She has no cervical adenopathy.   Neurological: She is alert and oriented to person, place, and time.   Skin: Skin is warm and dry. Capillary refill takes less than 2 seconds. No rash noted. No erythema.   Psychiatric: She has a normal mood and affect. Judgment and thought content normal.   Nursing note and vitals reviewed.      Assessment:       1. Closed nondisplaced fracture of head of right radius with routine healing, subsequent encounter    2. Overweight (BMI 25.0-29.9)    3. S/P PTCA (percutaneous transluminal coronary angioplasty)    4. Old MI (myocardial infarction)    5. Anxiety    6. Congestive heart failure with left ventricular diastolic dysfunction, NYHA class 2    7. Bronchitis    8. Elevated rheumatoid factor    9. Encounter for screening for cardiovascular disorders    10. Routine medical exam        Plan:       Closed nondisplaced fracture of head of right radius with routine healing, subsequent encounter  -     X-Ray Wrist Complete 3 views Right; Future; Expected date: 09/09/2019    Overweight (BMI 25.0-29.9)  -     omeprazole (PRILOSEC) 40 MG capsule; Take 1 capsule (40 mg total) by mouth once daily.  Dispense: 30 capsule; Refill: 5    S/P PTCA (percutaneous transluminal coronary angioplasty)  -     omeprazole (PRILOSEC) 40 MG capsule; Take 1 capsule (40 mg total) by mouth once daily.  Dispense: 30 capsule; Refill: 5    Old MI (myocardial infarction)  -     omeprazole (PRILOSEC) 40 MG capsule; Take 1 capsule (40 mg total) by mouth once daily.  Dispense: 30 capsule; Refill: 5    Anxiety  -     citalopram (CELEXA) 20 MG tablet; Take 1 tablet (20 mg total) by mouth once daily.   Dispense: 90 tablet; Refill: 3  -     ALPRAZolam (XANAX) 0.5 MG tablet; Take 1 tablet (0.5 mg total) by mouth nightly as needed.  Dispense: 30 tablet; Refill: 0  -     T4, free; Future; Expected date: 09/09/2019  -     TSH; Future; Expected date: 09/09/2019    Congestive heart failure with left ventricular diastolic dysfunction, NYHA class 2  -     Discontinue: carvedilol (COREG) 12.5 MG tablet; Take 1 tablet (12.5 mg total) by mouth 2 (two) times daily.  Dispense: 180 tablet; Refill: 3  -     carvedilol (COREG) 12.5 MG tablet; Take 1 tablet (12.5 mg total) by mouth 2 (two) times daily.  Dispense: 180 tablet; Refill: 3  -     POCT urine dipstick without microscope    Bronchitis  -     albuterol (PROVENTIL/VENTOLIN HFA) 90 mcg/actuation inhaler; Inhale 2 puffs into the lungs every 6 (six) hours as needed for Wheezing. Rescue  Dispense: 18 g; Refill: 1    Elevated rheumatoid factor  -     X-Ray Hand 2 View Bilat; Future; Expected date: 09/09/2019  -     predniSONE (DELTASONE) 20 MG tablet; Take 1 tablet (20 mg total) by mouth 2 (two) times daily. for 5 days  Dispense: 10 tablet; Refill: 0  -     CBC auto differential; Future; Expected date: 09/09/2019  -     Comprehensive metabolic panel; Future; Expected date: 09/09/2019  -     Rheumatoid factor; Future; Expected date: 09/09/2019    Encounter for screening for cardiovascular disorders  -     Lipid panel; Future; Expected date: 09/09/2019    Routine medical exam

## 2019-09-10 ENCOUNTER — TELEPHONE (OUTPATIENT)
Dept: PRIMARY CARE CLINIC | Facility: CLINIC | Age: 64
End: 2019-09-10

## 2019-09-10 DIAGNOSIS — S52.124D CLOSED NONDISPLACED FRACTURE OF HEAD OF RIGHT RADIUS WITH ROUTINE HEALING, SUBSEQUENT ENCOUNTER: Primary | ICD-10-CM

## 2019-09-10 DIAGNOSIS — M89.9 DISEASE OF BONE: ICD-10-CM

## 2019-09-10 NOTE — TELEPHONE ENCOUNTER
----- Message from Braxton Barragan MD sent at 9/10/2019  7:21 AM CDT -----  Please call the patient regarding her abnormal result. Rt wrist xray chronic fx distal radius and styloid process pt need wrist brace and need f/u with orthopedist and need bone density

## 2019-09-11 ENCOUNTER — TELEPHONE (OUTPATIENT)
Dept: PRIMARY CARE CLINIC | Facility: CLINIC | Age: 64
End: 2019-09-11

## 2019-09-11 ENCOUNTER — TELEPHONE (OUTPATIENT)
Dept: ORTHOPEDICS | Facility: CLINIC | Age: 64
End: 2019-09-11

## 2019-09-11 DIAGNOSIS — S52.124D CLOSED NONDISPLACED FRACTURE OF HEAD OF RIGHT RADIUS WITH ROUTINE HEALING, SUBSEQUENT ENCOUNTER: Primary | ICD-10-CM

## 2019-09-11 NOTE — TELEPHONE ENCOUNTER
Spoke with pt. Ref. Was pended to Dr. Barragan for Dr. Will Ross for Chronic Wrist fracture that was never healed. Waiting on Dr. Barragan to sign. Once signed will fax ref. & imaging to Dr. Ross. Pt. Will call their office to set up apt.

## 2019-09-11 NOTE — TELEPHONE ENCOUNTER
Pt. States she already has a wrist brace with metal that she uses when needed. Reviewed results of wrist x-ray. Pt. States she had PT for her wrist. Ref. For Ortho Lucio Padilla, gave her the telephone # to call and set up apt. Scheduled DEXA scan on Wed 9/18 at 9am.

## 2019-09-11 NOTE — TELEPHONE ENCOUNTER
----- Message from Mindy Hoffman sent at 9/11/2019  9:35 AM CDT -----  Contact: Pt self Mobile/Home [phone]   Patient is returning a phone call.  Who left a message for the patient:   Does patient know what this is regarding:    Comments: Patient said she received a call on today and she would like a call back please.

## 2019-09-11 NOTE — TELEPHONE ENCOUNTER
----- Message from Manju Hassan sent at 9/11/2019  2:32 PM CDT -----  Contact: self/[phone]  Patient is returning a phone call.  Who left a message for the patient: Katia  Does patient know what this is regarding: no   Comments:

## 2019-09-11 NOTE — TELEPHONE ENCOUNTER
----- Message from Georgina Paez sent at 9/11/2019  9:57 AM CDT -----  Contact: Self [phone]  PT is calling for a fu appt sooner than December. She states she was a pt of Dr. Ellis. He left in the middle of her therapy. PT can be reached at [phone].

## 2019-09-12 ENCOUNTER — OFFICE VISIT (OUTPATIENT)
Dept: ORTHOPEDICS | Facility: CLINIC | Age: 64
End: 2019-09-12
Payer: MEDICARE

## 2019-09-12 VITALS
DIASTOLIC BLOOD PRESSURE: 71 MMHG | WEIGHT: 145.63 LBS | SYSTOLIC BLOOD PRESSURE: 124 MMHG | BODY MASS INDEX: 25.79 KG/M2 | HEART RATE: 60 BPM

## 2019-09-12 DIAGNOSIS — S52.124D CLOSED NONDISPLACED FRACTURE OF HEAD OF RIGHT RADIUS WITH ROUTINE HEALING, SUBSEQUENT ENCOUNTER: ICD-10-CM

## 2019-09-12 PROCEDURE — 99999 PR PBB SHADOW E&M-EST. PATIENT-LVL III: CPT | Mod: PBBFAC,,, | Performed by: ORTHOPAEDIC SURGERY

## 2019-09-12 PROCEDURE — 3078F DIAST BP <80 MM HG: CPT | Mod: CPTII,S$GLB,, | Performed by: ORTHOPAEDIC SURGERY

## 2019-09-12 PROCEDURE — 3008F BODY MASS INDEX DOCD: CPT | Mod: CPTII,S$GLB,, | Performed by: ORTHOPAEDIC SURGERY

## 2019-09-12 PROCEDURE — 3008F PR BODY MASS INDEX (BMI) DOCUMENTED: ICD-10-PCS | Mod: CPTII,S$GLB,, | Performed by: ORTHOPAEDIC SURGERY

## 2019-09-12 PROCEDURE — 3078F PR MOST RECENT DIASTOLIC BLOOD PRESSURE < 80 MM HG: ICD-10-PCS | Mod: CPTII,S$GLB,, | Performed by: ORTHOPAEDIC SURGERY

## 2019-09-12 PROCEDURE — 99214 OFFICE O/P EST MOD 30 MIN: CPT | Mod: S$GLB,,, | Performed by: ORTHOPAEDIC SURGERY

## 2019-09-12 PROCEDURE — 3074F PR MOST RECENT SYSTOLIC BLOOD PRESSURE < 130 MM HG: ICD-10-PCS | Mod: CPTII,S$GLB,, | Performed by: ORTHOPAEDIC SURGERY

## 2019-09-12 PROCEDURE — 99214 PR OFFICE/OUTPT VISIT, EST, LEVL IV, 30-39 MIN: ICD-10-PCS | Mod: S$GLB,,, | Performed by: ORTHOPAEDIC SURGERY

## 2019-09-12 PROCEDURE — 3074F SYST BP LT 130 MM HG: CPT | Mod: CPTII,S$GLB,, | Performed by: ORTHOPAEDIC SURGERY

## 2019-09-12 PROCEDURE — 99999 PR PBB SHADOW E&M-EST. PATIENT-LVL III: ICD-10-PCS | Mod: PBBFAC,,, | Performed by: ORTHOPAEDIC SURGERY

## 2019-09-12 RX ORDER — MELOXICAM 15 MG/1
15 TABLET ORAL DAILY
Qty: 30 TABLET | Refills: 2 | Status: SHIPPED | OUTPATIENT
Start: 2019-09-12 | End: 2020-01-06

## 2019-09-12 NOTE — LETTER
September 12, 2019      Braxton Barragan MD  [address] [place] [address]           [facility]  [address], [facility] [address]  [place] [address]  Phone: [phone]  Fax: [phone]          Patient: Odette Hart   MR Number: [id number]   YOB: 1955   Date of Visit: 9/12/2019       Dear Dr. Braxton Barragan:    Thank you for referring Odette Hart to me for evaluation. Attached you will find relevant portions of my assessment and plan of care.    If you have questions, please do not hesitate to call me. I look forward to following Odette Hart along with you.    Sincerely,    Lucio Padilla MD    Enclosure  CC:  No Recipients    If you would like to receive this communication electronically, please contact [email or web address] or [phone] to request more information on [facility] [employer] access.    For providers and/or their staff who would like to refer a patient to [facility], please contact us through our one-stop-shop provider referral line, [facility], at [phone].    If you feel you have received this communication in error or would no longer like to receive these types of communications, please e-mail [email or web address]

## 2019-09-12 NOTE — PROGRESS NOTES
Orthopaedic Surgery History and Physical     History of present illness:   Odette Hart is a 63 y.o. female who presents with history of right wrist pain.  Patient sustained a fall in 2019 where she sustained a distal radius fracture of the right wrist. She presented to Dr. Ellis who made the recommendation of non operative management. Patient was cleared from the cast and presented to physical therapy and eventually discharged.  X-ray taken by primary care physician resulted in a report of a chronic fracture deformity at which point the person who called her told her her wrist was still fractured.  She has been wearing a brace since the phone call.  Presents today with dull achy pain in the wrist and reduced range of motion.    Allergies:   Review of patient's allergies indicates:   Allergen Reactions    Augmentin [amoxicillin-pot clavulanate] Swelling    Lisinopril Other (See Comments)     Fluid around heart    Losartan Other (See Comments)     High potassium    Shellfish containing products Anaphylaxis     Pt.states she is allergic to SEAFOOD since the age of 13    Statins-hmg-coa reductase inhibitors Other (See Comments)     Chest pains    Levaquin [levofloxacin] Other (See Comments)     Very bad joint pain    Clindamycin Palpitations     Chest pain       Past medical history:   Past Medical History:   Diagnosis Date    Acute coronary syndrome     Allergy     Arthritis     Cardiomyopathy     CHF (congestive heart failure)     Coronary artery disease     Diverticulitis     Diverticulosis     Heart attack     Heart disease     Hyperlipidemia     Hypertension     ICD (implantable cardioverter-defibrillator) in place        Past surgical history:  Past Surgical History:   Procedure Laterality Date    ATRIAL CARDIAC PACEMAKER INSERTION       SECTION      CORONARY STENT PLACEMENT         Social history:   Reviewed per EPIC history for tobacco or alcohol use      Medications:    Current Outpatient Medications:     albuterol (PROVENTIL/VENTOLIN HFA) 90 mcg/actuation inhaler, Inhale 2 puffs into the lungs every 6 (six) hours as needed for Wheezing. Rescue, Disp: 18 g, Rfl: 1    ALPRAZolam (XANAX) 0.5 MG tablet, Take 1 tablet (0.5 mg total) by mouth nightly as needed., Disp: 30 tablet, Rfl: 0    aspirin (ECOTRIN) 81 MG EC tablet, Take 81 mg by mouth once daily., Disp: , Rfl:     carvedilol (COREG) 12.5 MG tablet, Take 1 tablet (12.5 mg total) by mouth 2 (two) times daily., Disp: 180 tablet, Rfl: 3    citalopram (CELEXA) 20 MG tablet, Take 1 tablet (20 mg total) by mouth once daily., Disp: 90 tablet, Rfl: 3    ezetimibe (ZETIA) 10 mg tablet, Take 1 tablet (10 mg total) by mouth once daily., Disp: 90 tablet, Rfl: 3    omeprazole (PRILOSEC) 40 MG capsule, Take 1 capsule (40 mg total) by mouth once daily., Disp: 30 capsule, Rfl: 5    predniSONE (DELTASONE) 20 MG tablet, Take 1 tablet (20 mg total) by mouth 2 (two) times daily. for 5 days, Disp: 10 tablet, Rfl: 0    Review of systems:  Denies chest pain, palpitations, shortness of breath.   Denies excessive thirst, urination or heat or cold intolerance.   Denies nausea, vomiting, melena or hematochezia.    Denies fever, chills, night sweats, weight loss.    Denies dysuria or hematuria.   Denies history of anxiety or depression.   Denies any skin abnormalities or rash.   Denies upper or lower extremity paresthesias or lightheadedness.    Denies cough, shortness of breath or hemoptysis.     Physical Exam:   Vitals:    09/12/19 1322   BP: 124/71   Pulse: 60   Weight: 66 kg (145 lb 9.8 oz)     Awake/alert/oriented x3, No acute distress, Afebrile, Vital signs stable  Normocephalic, Atraumatic  Heart is beating at normal rate  Good inspiratory effort with unlaboured breathing  Abdomen soft/nondistended/nontender    Right upper extremity  Motor intact C5-T1  Sensation intact C5-T2  2+ brachial/radial/ulnar pulses  <2s CR refill  to digits        Imaging:  Radiographs of the right wrist demonstrate a malunion of the distal radius fracture with 15° of dorsal angulation    Assessment:   63 y.o. female with right wrist pain    Plan:   Activity as tolerated  NSAIDs p.r.n., script given the patient  Return to clinic p.r.n.    Lucio Padilla MD  [facility]

## 2019-10-02 ENCOUNTER — CLINICAL SUPPORT (OUTPATIENT)
Dept: PRIMARY CARE CLINIC | Facility: CLINIC | Age: 64
End: 2019-10-02
Payer: MEDICARE

## 2019-10-02 DIAGNOSIS — F41.9 ANXIETY: ICD-10-CM

## 2019-10-02 DIAGNOSIS — R76.8 ELEVATED RHEUMATOID FACTOR: ICD-10-CM

## 2019-10-02 DIAGNOSIS — Z13.6 ENCOUNTER FOR SCREENING FOR CARDIOVASCULAR DISORDERS: ICD-10-CM

## 2019-10-02 LAB
ALBUMIN SERPL BCP-MCNC: 3.8 G/DL (ref 3.5–5.2)
ALP SERPL-CCNC: 76 U/L (ref 38–126)
ALT SERPL W/O P-5'-P-CCNC: 24 U/L (ref 14–54)
ANION GAP SERPL CALC-SCNC: 7 MMOL/L (ref 8–16)
AST SERPL-CCNC: 23 U/L (ref 15–41)
BASOPHILS # BLD AUTO: 0 K/UL (ref 0–0.2)
BASOPHILS NFR BLD: 0.4 % (ref 0–1.9)
BILIRUB SERPL-MCNC: 0.8 MG/DL (ref 0.3–1.2)
BILIRUB SERPL-MCNC: ABNORMAL MG/DL
BLOOD URINE, POC: 50
BUN SERPL-MCNC: 15 MG/DL (ref 8–23)
CALCIUM SERPL-MCNC: 9.3 MG/DL (ref 8.6–10)
CHLORIDE SERPL-SCNC: 101 MMOL/L (ref 101–111)
CHOLEST SERPL-MCNC: 331 MG/DL (ref 80–200)
CHOLEST/HDLC SERPL: 5.6 {RATIO} (ref 2–5)
CO2 SERPL-SCNC: 32 MMOL/L (ref 23–29)
COLOR, POC UA: YELLOW
CREAT SERPL-MCNC: 0.7 MG/DL (ref 0.5–1.4)
DIFFERENTIAL METHOD: ABNORMAL
EOSINOPHIL # BLD AUTO: 0.1 K/UL (ref 0–0.5)
EOSINOPHIL NFR BLD: 1.1 % (ref 0–8)
ERYTHROCYTE [DISTWIDTH] IN BLOOD BY AUTOMATED COUNT: 14.4 % (ref 11.5–14.5)
EST. GFR  (AFRICAN AMERICAN): >60 ML/MIN/1.73 M^2
EST. GFR  (NON AFRICAN AMERICAN): >60 ML/MIN/1.73 M^2
GLUCOSE SERPL-MCNC: 99 MG/DL (ref 74–118)
GLUCOSE UR QL STRIP: NORMAL
HCT VFR BLD AUTO: 46.1 % (ref 37–48.5)
HDLC SERPL-MCNC: 59 MG/DL (ref 40–75)
HDLC SERPL: 17.8 % (ref 20–50)
HGB BLD-MCNC: 15.2 G/DL (ref 12–16)
KETONES UR QL STRIP: ABNORMAL
LDLC SERPL CALC-MCNC: 230 MG/DL
LEUKOCYTE ESTERASE URINE, POC: ABNORMAL
LYMPHOCYTES # BLD AUTO: 2.6 K/UL (ref 1–4.8)
LYMPHOCYTES NFR BLD: 48.4 % (ref 18–48)
MCH RBC QN AUTO: 29.5 PG (ref 27–31)
MCHC RBC AUTO-ENTMCNC: 33 G/DL (ref 32–36)
MCV RBC AUTO: 90 FL (ref 82–98)
MONOCYTES # BLD AUTO: 0.7 K/UL (ref 0.3–1)
MONOCYTES NFR BLD: 13.5 % (ref 4–15)
NEUTROPHILS # BLD AUTO: 2 K/UL (ref 1.8–7.7)
NEUTROPHILS NFR BLD: 36.6 % (ref 38–73)
NITRITE, POC UA: ABNORMAL
NONHDLC SERPL-MCNC: 272 MG/DL
PH, POC UA: 7
PLATELET # BLD AUTO: 207 K/UL (ref 150–350)
PMV BLD AUTO: 9.2 FL (ref 9.2–12.9)
POTASSIUM SERPL-SCNC: 4.5 MMOL/L (ref 3.5–5.1)
PROT SERPL-MCNC: 7 G/DL (ref 6–8.4)
PROTEIN, POC: ABNORMAL
RBC # BLD AUTO: 5.14 M/UL (ref 4–5.4)
SODIUM SERPL-SCNC: 140 MMOL/L (ref 136–145)
SPECIFIC GRAVITY, POC UA: 1.01
T4 FREE SERPL-MCNC: 1.05 NG/DL (ref 0.61–1.12)
TRIGL SERPL-MCNC: 211 MG/DL (ref 30–150)
TSH SERPL DL<=0.005 MIU/L-ACNC: 1.99 UIU/ML (ref 0.45–5.33)
UROBILINOGEN, POC UA: NORMAL
WBC # BLD AUTO: 5.3 K/UL (ref 3.9–12.7)

## 2019-10-02 PROCEDURE — 80053 COMPREHEN METABOLIC PANEL: CPT

## 2019-10-02 PROCEDURE — 80061 LIPID PANEL: CPT

## 2019-10-02 PROCEDURE — 85025 COMPLETE CBC W/AUTO DIFF WBC: CPT

## 2019-10-02 PROCEDURE — 84443 ASSAY THYROID STIM HORMONE: CPT

## 2019-10-02 PROCEDURE — 36415 PR COLLECTION VENOUS BLOOD,VENIPUNCTURE: ICD-10-PCS | Mod: S$GLB,,, | Performed by: INTERNAL MEDICINE

## 2019-10-02 PROCEDURE — 84439 ASSAY OF FREE THYROXINE: CPT

## 2019-10-02 PROCEDURE — 36415 COLL VENOUS BLD VENIPUNCTURE: CPT | Mod: S$GLB,,, | Performed by: INTERNAL MEDICINE

## 2019-10-02 PROCEDURE — 86431 RHEUMATOID FACTOR QUANT: CPT

## 2019-10-03 ENCOUNTER — TELEPHONE (OUTPATIENT)
Dept: CARDIOLOGY | Facility: CLINIC | Age: 64
End: 2019-10-03

## 2019-10-03 NOTE — TELEPHONE ENCOUNTER
Spoke to patient.  Will talk to Dr Gilliam's nurse and schedule an appointment and device check in [facility] and call her on Monday.     ----- Message from Alis Adkins sent at 10/3/2019  4:12 PM CDT -----  Contact: Patient   Patient would like a nurse to call her back regarding her pack maker check is due and if the new doctor will have her records    [phone]

## 2019-10-04 LAB — RHEUMATOID FACT SERPL-ACNC: 701 IU/ML (ref 0–15)

## 2019-10-07 ENCOUNTER — TELEPHONE (OUTPATIENT)
Dept: CARDIOLOGY | Facility: CLINIC | Age: 64
End: 2019-10-07

## 2019-10-07 NOTE — TELEPHONE ENCOUNTER
Scheduled patients follow up clinic visit with Dr Gilliam and the [employer] rep for 10/21/2019 @ 8 am.  Confirmed location, date and time with patient.  Patient verbalized understanding.

## 2019-10-15 ENCOUNTER — TELEPHONE (OUTPATIENT)
Dept: PRIMARY CARE CLINIC | Facility: CLINIC | Age: 64
End: 2019-10-15

## 2019-10-15 NOTE — TELEPHONE ENCOUNTER
----- Message from Pamela Kaiser sent at 10/15/2019  3:33 PM CDT -----  Contact: Patient  Type: Needs Medical Advice    Who Called:  Odette, patient  Symptoms (please be specific):  N/A  How long has patient had these symptoms:  N/A  Pharmacy name and phone #:  N/A  Best Call Back Number: [phone]  Additional Information: Calling to ask for her clinicals to be sent to Dr Ross, fax [phone]. Call her to let her know you did it. Thanks.

## 2019-10-16 ENCOUNTER — PATIENT OUTREACH (OUTPATIENT)
Dept: ADMINISTRATIVE | Facility: OTHER | Age: 64
End: 2019-10-16

## 2019-10-16 ENCOUNTER — TELEPHONE (OUTPATIENT)
Dept: PRIMARY CARE CLINIC | Facility: CLINIC | Age: 64
End: 2019-10-16

## 2019-10-16 NOTE — TELEPHONE ENCOUNTER
Spoke with pt. Regarding phone call about mammogram - pt. Would like to decline the mammogram at this time. Post-pone till Jan 2020

## 2019-10-16 NOTE — TELEPHONE ENCOUNTER
----- Message from Luna Cheek sent at 10/16/2019  3:15 PM CDT -----  Contact: self   Patient is returning a phone call.  Who left a message for the patient: Pearl Romero LPN  Does patient know what this is regarding:  ?  Comments:

## 2019-10-17 ENCOUNTER — PATIENT MESSAGE (OUTPATIENT)
Dept: PRIMARY CARE CLINIC | Facility: CLINIC | Age: 64
End: 2019-10-17

## 2019-10-17 DIAGNOSIS — J32.9 SINUSITIS, UNSPECIFIED CHRONICITY, UNSPECIFIED LOCATION: Primary | ICD-10-CM

## 2019-10-17 RX ORDER — FLUTICASONE PROPIONATE 50 MCG
1 SPRAY, SUSPENSION (ML) NASAL DAILY
Qty: 9.9 ML | Refills: 1 | Status: SHIPPED | OUTPATIENT
Start: 2019-10-17 | End: 2020-01-06 | Stop reason: SDUPTHER

## 2019-10-19 ENCOUNTER — PATIENT OUTREACH (OUTPATIENT)
Dept: ADMINISTRATIVE | Facility: HOSPITAL | Age: 64
End: 2019-10-19

## 2019-10-20 NOTE — PROGRESS NOTES
Attempted to contact patient to discuss/schedule overdue HM. I advised number on file is the wrong number.   Portal message sent to patient w/ appt tasks attached.

## 2019-10-21 ENCOUNTER — TELEPHONE (OUTPATIENT)
Dept: CARDIOLOGY | Facility: CLINIC | Age: 64
End: 2019-10-21

## 2019-10-30 DIAGNOSIS — F41.9 ANXIETY: ICD-10-CM

## 2019-10-31 ENCOUNTER — PATIENT MESSAGE (OUTPATIENT)
Dept: PRIMARY CARE CLINIC | Facility: CLINIC | Age: 64
End: 2019-10-31

## 2019-10-31 RX ORDER — ALPRAZOLAM 0.5 MG/1
TABLET ORAL
Qty: 30 TABLET | Refills: 0 | Status: SHIPPED | OUTPATIENT
Start: 2019-10-31 | End: 2019-11-29 | Stop reason: SDUPTHER

## 2019-11-07 ENCOUNTER — PATIENT MESSAGE (OUTPATIENT)
Dept: PRIMARY CARE CLINIC | Facility: CLINIC | Age: 64
End: 2019-11-07

## 2019-11-26 ENCOUNTER — CLINICAL SUPPORT (OUTPATIENT)
Dept: CARDIOLOGY | Facility: CLINIC | Age: 64
End: 2019-11-26
Payer: MEDICARE

## 2019-11-26 DIAGNOSIS — Z95.810 AICD (AUTOMATIC CARDIOVERTER/DEFIBRILLATOR) PRESENT: Primary | ICD-10-CM

## 2019-11-26 PROCEDURE — 99499 NO LOS: ICD-10-PCS | Mod: S$GLB,,, | Performed by: INTERNAL MEDICINE

## 2019-11-26 PROCEDURE — 99499 UNLISTED E&M SERVICE: CPT | Mod: S$GLB,,, | Performed by: INTERNAL MEDICINE

## 2019-11-26 PROCEDURE — 93296 REM INTERROG EVL PM/IDS: CPT | Mod: S$GLB,,, | Performed by: INTERNAL MEDICINE

## 2019-11-26 PROCEDURE — 93295 DEV INTERROG REMOTE 1/2/MLT: CPT | Mod: S$GLB,,, | Performed by: INTERNAL MEDICINE

## 2019-11-26 PROCEDURE — 93295 PR INTERROG EVAL, REMOTE, UP TO 90 DAYS,CARDVERT/DEFIB: ICD-10-PCS | Mod: S$GLB,,, | Performed by: INTERNAL MEDICINE

## 2019-11-26 PROCEDURE — 93296 PR INTERROG EVAL, REMOTE, UP TO 90 DAYS, PACER/DEFIB,TECH REVIEW: ICD-10-PCS | Mod: S$GLB,,, | Performed by: INTERNAL MEDICINE

## 2019-11-29 ENCOUNTER — OFFICE VISIT (OUTPATIENT)
Dept: PRIMARY CARE CLINIC | Facility: CLINIC | Age: 64
End: 2019-11-29
Payer: MEDICARE

## 2019-11-29 VITALS
RESPIRATION RATE: 18 BRPM | SYSTOLIC BLOOD PRESSURE: 128 MMHG | BODY MASS INDEX: 26.38 KG/M2 | DIASTOLIC BLOOD PRESSURE: 78 MMHG | WEIGHT: 148.88 LBS | HEIGHT: 63 IN | HEART RATE: 60 BPM | TEMPERATURE: 98 F | OXYGEN SATURATION: 98 %

## 2019-11-29 DIAGNOSIS — J01.90 ACUTE NON-RECURRENT SINUSITIS, UNSPECIFIED LOCATION: Primary | ICD-10-CM

## 2019-11-29 DIAGNOSIS — Z98.61 S/P PTCA (PERCUTANEOUS TRANSLUMINAL CORONARY ANGIOPLASTY): ICD-10-CM

## 2019-11-29 DIAGNOSIS — Z88.8 ALLERGY TO STATIN MEDICATION: ICD-10-CM

## 2019-11-29 DIAGNOSIS — I50.42 CHRONIC COMBINED SYSTOLIC AND DIASTOLIC CONGESTIVE HEART FAILURE: ICD-10-CM

## 2019-11-29 DIAGNOSIS — R05.9 COUGH: ICD-10-CM

## 2019-11-29 DIAGNOSIS — F41.9 ANXIETY: ICD-10-CM

## 2019-11-29 PROCEDURE — 3008F BODY MASS INDEX DOCD: CPT | Mod: CPTII,S$GLB,, | Performed by: INTERNAL MEDICINE

## 2019-11-29 PROCEDURE — 99999 PR PBB SHADOW E&M-EST. PATIENT-LVL IV: CPT | Mod: PBBFAC,,, | Performed by: INTERNAL MEDICINE

## 2019-11-29 PROCEDURE — 99499 RISK ADDL DX/OHS AUDIT: ICD-10-PCS | Mod: S$GLB,,, | Performed by: INTERNAL MEDICINE

## 2019-11-29 PROCEDURE — 96372 PR INJECTION,THERAP/PROPH/DIAG2ST, IM OR SUBCUT: ICD-10-PCS | Mod: S$GLB,,, | Performed by: INTERNAL MEDICINE

## 2019-11-29 PROCEDURE — 3078F PR MOST RECENT DIASTOLIC BLOOD PRESSURE < 80 MM HG: ICD-10-PCS | Mod: CPTII,S$GLB,, | Performed by: INTERNAL MEDICINE

## 2019-11-29 PROCEDURE — 99214 OFFICE O/P EST MOD 30 MIN: CPT | Mod: 25,S$GLB,, | Performed by: INTERNAL MEDICINE

## 2019-11-29 PROCEDURE — 3074F PR MOST RECENT SYSTOLIC BLOOD PRESSURE < 130 MM HG: ICD-10-PCS | Mod: CPTII,S$GLB,, | Performed by: INTERNAL MEDICINE

## 2019-11-29 PROCEDURE — 3074F SYST BP LT 130 MM HG: CPT | Mod: CPTII,S$GLB,, | Performed by: INTERNAL MEDICINE

## 2019-11-29 PROCEDURE — 99499 UNLISTED E&M SERVICE: CPT | Mod: S$GLB,,, | Performed by: INTERNAL MEDICINE

## 2019-11-29 PROCEDURE — 99999 PR PBB SHADOW E&M-EST. PATIENT-LVL IV: ICD-10-PCS | Mod: PBBFAC,,, | Performed by: INTERNAL MEDICINE

## 2019-11-29 PROCEDURE — 96372 THER/PROPH/DIAG INJ SC/IM: CPT | Mod: S$GLB,,, | Performed by: INTERNAL MEDICINE

## 2019-11-29 PROCEDURE — 3008F PR BODY MASS INDEX (BMI) DOCUMENTED: ICD-10-PCS | Mod: CPTII,S$GLB,, | Performed by: INTERNAL MEDICINE

## 2019-11-29 PROCEDURE — 3078F DIAST BP <80 MM HG: CPT | Mod: CPTII,S$GLB,, | Performed by: INTERNAL MEDICINE

## 2019-11-29 PROCEDURE — 99214 PR OFFICE/OUTPT VISIT, EST, LEVL IV, 30-39 MIN: ICD-10-PCS | Mod: 25,S$GLB,, | Performed by: INTERNAL MEDICINE

## 2019-11-29 RX ORDER — ALPRAZOLAM 0.5 MG/1
0.5 TABLET ORAL NIGHTLY PRN
Qty: 30 TABLET | Refills: 0 | Status: SHIPPED | OUTPATIENT
Start: 2019-11-29 | End: 2020-01-06 | Stop reason: SDUPTHER

## 2019-11-29 RX ORDER — TRIAMCINOLONE ACETONIDE 40 MG/ML
80 INJECTION, SUSPENSION INTRA-ARTICULAR; INTRAMUSCULAR ONCE
Status: COMPLETED | OUTPATIENT
Start: 2019-11-29 | End: 2019-11-29

## 2019-11-29 RX ORDER — CODEINE PHOSPHATE AND GUAIFENESIN 10; 100 MG/5ML; MG/5ML
5 SOLUTION ORAL EVERY 6 HOURS PRN
Qty: 120 ML | Refills: 0 | Status: ON HOLD | OUTPATIENT
Start: 2019-11-29 | End: 2021-01-15 | Stop reason: HOSPADM

## 2019-11-29 RX ORDER — AZITHROMYCIN 250 MG/1
TABLET, FILM COATED ORAL
Qty: 6 TABLET | Refills: 0 | Status: SHIPPED | OUTPATIENT
Start: 2019-11-29 | End: 2019-12-03

## 2019-11-29 RX ADMIN — TRIAMCINOLONE ACETONIDE 80 MG: 40 INJECTION, SUSPENSION INTRA-ARTICULAR; INTRAMUSCULAR at 02:11

## 2019-11-29 NOTE — PROGRESS NOTES
Verified pt ID using name and . Allergies Verified with pt. Administered Kenalog 80 mg IM in Right VG per physician order using aseptic technique. Aspirated and no blood return noted. Pt tolerated well with no adverse reactions noted.

## 2019-11-29 NOTE — PROGRESS NOTES
Subjective:       Patient ID: Odette Hart is a 64 y.o. female.    Chief Complaint: Cough (x 1 week) and Back Pain (upper back)    HPI  Pt c/o coughing congestion and upper back pian since last week also chronic anxiety h/o MI with cardiomyopthy CHF with EF 20% sob with exertion seen by cardiolgy x 2 had stress test positive need neclera stress test but pt refuse afraid will give MI try get another cardiology 2nd opinion  Also seen by hand surgery had 3 injections into hands had Rx for pain med but no pharmacy will fill it and having productive cough with greenish mucous since last week  Review of Systems    Objective:      Physical Exam   Constitutional: She is oriented to person, place, and time. She appears well-developed and well-nourished. No distress.   HENT:   Head: Normocephalic and atraumatic.   Right Ear: External ear normal.   Left Ear: External ear normal.   Nose: Nose normal.   Mouth/Throat: Oropharynx is clear and moist. No oropharyngeal exudate.   Eyes: Pupils are equal, round, and reactive to light. Conjunctivae and EOM are normal. Right eye exhibits no discharge. Left eye exhibits no discharge.   Neck: Normal range of motion. Neck supple. No thyromegaly present.   Cardiovascular: Normal rate, regular rhythm, normal heart sounds and intact distal pulses. Exam reveals no gallop and no friction rub.   No murmur heard.  Pulmonary/Chest: Effort normal and breath sounds normal. No respiratory distress. She has no wheezes. She has no rales. She exhibits no tenderness.   Abdominal: Soft. Bowel sounds are normal. She exhibits no distension. There is no tenderness. There is no rebound and no guarding.   Musculoskeletal: Normal range of motion. She exhibits no edema, tenderness or deformity.   Lymphadenopathy:     She has no cervical adenopathy.   Neurological: She is alert and oriented to person, place, and time.   Skin: Skin is warm and dry. Capillary refill takes less than 2 seconds. No rash noted. No  erythema.   Psychiatric: She has a normal mood and affect. Judgment and thought content normal.   Nursing note and vitals reviewed.      Assessment:       1. Acute non-recurrent sinusitis, unspecified location    2. Cough    3. Chronic combined systolic and diastolic congestive heart failure    4. S/P PTCA (percutaneous transluminal coronary angioplasty)    5. Anxiety    6. Allergy to statin medication        Plan:       Acute non-recurrent sinusitis, unspecified location  -     triamcinolone acetonide injection 80 mg  -     azithromycin (Z-LOWELL) 250 MG tablet; 2 tabs by mouth day 1, then 1 tab by mouth daily x 4 days  Dispense: 6 tablet; Refill: 0    Cough  -     guaifenesin-codeine 100-10 mg/5 ml (TUSSI-ORGANIDIN NR)  mg/5 mL syrup; Take 5 mLs by mouth every 6 (six) hours as needed.  Dispense: 120 mL; Refill: 0    Chronic combined systolic and diastolic congestive heart failure  Comments:  EF 20% h/o CAD s/p stent h/o MI pt is getting 2nd opinion need neclear stress test vs cardiac cath had abnormal stress test    S/P PTCA (percutaneous transluminal coronary angioplasty)    Anxiety  -     ALPRAZolam (XANAX) 0.5 MG tablet; Take 1 tablet (0.5 mg total) by mouth nightly as needed.  Dispense: 30 tablet; Refill: 0    Allergy to statin medication  Comments:  pt is allergic to statin meds

## 2019-12-03 NOTE — PROGRESS NOTES
Subjective:      Patient ID: Odette Hart is a 64 y.o. female.    Chief Complaint: No chief complaint on file.    HPI:    ROS [employer] AICD remote interrogation report downloaded and prepared by medical assistant and interpreted by me and full report scanned into Epic media.  Battery OK with JOSE 7 years.  Leads OK.  No events. No ventricular pacing.  32% atrial pacing.  Normal device function    Past Medical History:   Diagnosis Date    Acute coronary syndrome     Allergy     Arthritis     Cardiomyopathy     CHF (congestive heart failure)     Coronary artery disease     Diverticulitis     Diverticulosis     Heart attack     Heart disease     Hyperlipidemia     Hypertension     ICD (implantable cardioverter-defibrillator) in place         Past Surgical History:   Procedure Laterality Date    ATRIAL CARDIAC PACEMAKER INSERTION       SECTION      CORONARY STENT PLACEMENT         Family History   Problem Relation Age of Onset    Heart disease Mother     Emphysema Father     Heart attack Brother        Social History     Socioeconomic History    Marital status:      Spouse name: Not on file    Number of children: Not on file    Years of education: Not on file    Highest education level: Not on file   Occupational History    Not on file   Social Needs    Financial resource strain: Not on file    Food insecurity:     Worry: Not on file     Inability: Not on file    Transportation needs:     Medical: Not on file     Non-medical: Not on file   Tobacco Use    Smoking status: Former Smoker     Packs/day: 0.50     Start date: 1976     Last attempt to quit: 2012     Years since quittin.9    Smokeless tobacco: Never Used   Substance and Sexual Activity    Alcohol use: No    Drug use: No    Sexual activity: Not on file   Lifestyle    Physical activity:     Days per week: Not on file     Minutes per session: Not on file    Stress: Not on file    Relationships    Social connections:     Talks on phone: Not on file     Gets together: Not on file     Attends Spiritism service: Not on file     Active member of club or organization: Not on file     Attends meetings of clubs or organizations: Not on file     Relationship status: Not on file   Other Topics Concern    Not on file   Social History Narrative    Not on file       Current Outpatient Medications on File Prior to Visit   Medication Sig Dispense Refill    albuterol (PROVENTIL/VENTOLIN HFA) 90 mcg/actuation inhaler Inhale 2 puffs into the lungs every 6 (six) hours as needed for Wheezing. Rescue 18 g 1    aspirin (ECOTRIN) 81 MG EC tablet Take 81 mg by mouth once daily.      carvedilol (COREG) 12.5 MG tablet Take 1 tablet (12.5 mg total) by mouth 2 (two) times daily. 180 tablet 3    citalopram (CELEXA) 20 MG tablet Take 1 tablet (20 mg total) by mouth once daily. 90 tablet 3    ezetimibe (ZETIA) 10 mg tablet Take 1 tablet (10 mg total) by mouth once daily. 90 tablet 3    fluticasone propionate (FLONASE) 50 mcg/actuation nasal spray 1 spray (50 mcg total) by Each Nostril route once daily. 9.9 mL 1    meloxicam (MOBIC) 15 MG tablet Take 1 tablet (15 mg total) by mouth once daily. Do not take with any other NSAIDs  Take with a meal 30 tablet 2    omeprazole (PRILOSEC) 40 MG capsule Take 1 capsule (40 mg total) by mouth once daily. 30 capsule 5     No current facility-administered medications on file prior to visit.        Review of patient's allergies indicates:   Allergen Reactions    Augmentin [amoxicillin-pot clavulanate] Swelling    Lisinopril Other (See Comments)     Fluid around heart    Losartan Other (See Comments)     High potassium    Shellfish containing products Anaphylaxis     Pt.states she is allergic to SEAFOOD since the age of 13    Statins-hmg-coa reductase inhibitors Other (See Comments)     Chest pains    Levaquin [levofloxacin] Other (See Comments)     Very bad joint pain     Clindamycin Palpitations     Chest pain     Objective:   There were no vitals filed for this visit.     Physical Exam     Assessment:     1. AICD (automatic cardioverter/defibrillator) present      Plan:   Diagnoses and all orders for this visit:    AICD (automatic cardioverter/defibrillator) present         No follow-ups on file.

## 2019-12-12 ENCOUNTER — PATIENT OUTREACH (OUTPATIENT)
Dept: ADMINISTRATIVE | Facility: OTHER | Age: 64
End: 2019-12-12

## 2020-01-06 ENCOUNTER — OFFICE VISIT (OUTPATIENT)
Dept: PRIMARY CARE CLINIC | Facility: CLINIC | Age: 65
End: 2020-01-06
Payer: MEDICARE

## 2020-01-06 VITALS
RESPIRATION RATE: 18 BRPM | WEIGHT: 149.69 LBS | DIASTOLIC BLOOD PRESSURE: 80 MMHG | HEART RATE: 67 BPM | SYSTOLIC BLOOD PRESSURE: 138 MMHG | BODY MASS INDEX: 26.52 KG/M2 | OXYGEN SATURATION: 97 % | HEIGHT: 63 IN | TEMPERATURE: 98 F

## 2020-01-06 DIAGNOSIS — F41.9 ANXIETY: ICD-10-CM

## 2020-01-06 DIAGNOSIS — Z01.419 ROUTINE GYNECOLOGICAL EXAMINATION: Primary | ICD-10-CM

## 2020-01-06 DIAGNOSIS — Z13.6 ENCOUNTER FOR LIPID SCREENING FOR CARDIOVASCULAR DISEASE: ICD-10-CM

## 2020-01-06 DIAGNOSIS — J32.9 SINUSITIS, UNSPECIFIED CHRONICITY, UNSPECIFIED LOCATION: ICD-10-CM

## 2020-01-06 DIAGNOSIS — J40 BRONCHITIS: ICD-10-CM

## 2020-01-06 DIAGNOSIS — Z13.220 ENCOUNTER FOR LIPID SCREENING FOR CARDIOVASCULAR DISEASE: ICD-10-CM

## 2020-01-06 DIAGNOSIS — R58 ECCHYMOSIS OF FOREARM: ICD-10-CM

## 2020-01-06 PROCEDURE — 3008F BODY MASS INDEX DOCD: CPT | Mod: CPTII,S$GLB,, | Performed by: INTERNAL MEDICINE

## 2020-01-06 PROCEDURE — 3075F SYST BP GE 130 - 139MM HG: CPT | Mod: CPTII,S$GLB,, | Performed by: INTERNAL MEDICINE

## 2020-01-06 PROCEDURE — 99213 OFFICE O/P EST LOW 20 MIN: CPT | Mod: 25,S$GLB,, | Performed by: INTERNAL MEDICINE

## 2020-01-06 PROCEDURE — 96372 THER/PROPH/DIAG INJ SC/IM: CPT | Mod: S$GLB,,, | Performed by: INTERNAL MEDICINE

## 2020-01-06 PROCEDURE — 3079F PR MOST RECENT DIASTOLIC BLOOD PRESSURE 80-89 MM HG: ICD-10-PCS | Mod: CPTII,S$GLB,, | Performed by: INTERNAL MEDICINE

## 2020-01-06 PROCEDURE — 99213 PR OFFICE/OUTPT VISIT, EST, LEVL III, 20-29 MIN: ICD-10-PCS | Mod: 25,S$GLB,, | Performed by: INTERNAL MEDICINE

## 2020-01-06 PROCEDURE — 96372 PR INJECTION,THERAP/PROPH/DIAG2ST, IM OR SUBCUT: ICD-10-PCS | Mod: S$GLB,,, | Performed by: INTERNAL MEDICINE

## 2020-01-06 PROCEDURE — 3079F DIAST BP 80-89 MM HG: CPT | Mod: CPTII,S$GLB,, | Performed by: INTERNAL MEDICINE

## 2020-01-06 PROCEDURE — 99999 PR PBB SHADOW E&M-EST. PATIENT-LVL IV: CPT | Mod: PBBFAC,,, | Performed by: INTERNAL MEDICINE

## 2020-01-06 PROCEDURE — 3008F PR BODY MASS INDEX (BMI) DOCUMENTED: ICD-10-PCS | Mod: CPTII,S$GLB,, | Performed by: INTERNAL MEDICINE

## 2020-01-06 PROCEDURE — 99999 PR PBB SHADOW E&M-EST. PATIENT-LVL IV: ICD-10-PCS | Mod: PBBFAC,,, | Performed by: INTERNAL MEDICINE

## 2020-01-06 PROCEDURE — 3075F PR MOST RECENT SYSTOLIC BLOOD PRESS GE 130-139MM HG: ICD-10-PCS | Mod: CPTII,S$GLB,, | Performed by: INTERNAL MEDICINE

## 2020-01-06 RX ORDER — ALPRAZOLAM 0.5 MG/1
0.5 TABLET ORAL NIGHTLY PRN
Qty: 30 TABLET | Refills: 0 | Status: SHIPPED | OUTPATIENT
Start: 2020-01-06 | End: 2020-06-13

## 2020-01-06 RX ORDER — TRIAMCINOLONE ACETONIDE 40 MG/ML
60 INJECTION, SUSPENSION INTRA-ARTICULAR; INTRAMUSCULAR ONCE
Status: COMPLETED | OUTPATIENT
Start: 2020-01-06 | End: 2020-01-06

## 2020-01-06 RX ORDER — AZITHROMYCIN 250 MG/1
TABLET, FILM COATED ORAL
Qty: 6 TABLET | Refills: 0 | Status: SHIPPED | OUTPATIENT
Start: 2020-01-06 | End: 2020-01-10

## 2020-01-06 RX ORDER — GUAIFENESIN 1200 MG/1
1200 TABLET, EXTENDED RELEASE ORAL 2 TIMES DAILY PRN
Qty: 30 TABLET | Refills: 1 | Status: SHIPPED | OUTPATIENT
Start: 2020-01-06 | End: 2020-01-16

## 2020-01-06 RX ORDER — BENZONATATE 200 MG/1
200 CAPSULE ORAL 3 TIMES DAILY PRN
Qty: 30 CAPSULE | Refills: 0 | Status: SHIPPED | OUTPATIENT
Start: 2020-01-06 | End: 2020-01-16

## 2020-01-06 RX ORDER — FLUTICASONE PROPIONATE 50 MCG
1 SPRAY, SUSPENSION (ML) NASAL DAILY
Qty: 9.9 ML | Refills: 1 | Status: SHIPPED | OUTPATIENT
Start: 2020-01-06 | End: 2021-08-02 | Stop reason: SDUPTHER

## 2020-01-06 RX ORDER — ALBUTEROL SULFATE 90 UG/1
2 AEROSOL, METERED RESPIRATORY (INHALATION) EVERY 6 HOURS PRN
Qty: 18 G | Refills: 1 | Status: SHIPPED | OUTPATIENT
Start: 2020-01-06 | End: 2020-06-14 | Stop reason: SDUPTHER

## 2020-01-06 RX ADMIN — TRIAMCINOLONE ACETONIDE 60 MG: 40 INJECTION, SUSPENSION INTRA-ARTICULAR; INTRAMUSCULAR at 01:01

## 2020-01-06 NOTE — PROGRESS NOTES
Subjective:       Patient ID: Odette Hart is a 64 y.o. female.    Chief Complaint: Follow-up (bronchitis); Cough; and bruising on arms    HPI  patient states see had chest go 2 week before [place] getting better but the day before [place] the symptoms come back with coughing sneezing feel that the mucus go into her lung and causing pressure in her year and cm been coughing since then her cough is mostly nonproductive she deny high fever does have mild short of breath and chest hurt when she cough she has history of cardiomyopathy coronary artery disease she used to see Dr. Cowart but now changing to Dr. Gilliam she already has appointment with him patient also having chronic anxiety she take a low-dose xanax sometime she deny depression suicidal thought or ideation  Review of Systems    Objective:      Physical Exam   Constitutional: She is oriented to person, place, and time. She appears well-developed and well-nourished. No distress.   HENT:   Head: Normocephalic and atraumatic.   Right Ear: External ear normal.   Left Ear: External ear normal.   Mouth/Throat: Oropharynx is clear and moist. No oropharyngeal exudate.   Nasal congestion bilaterally   Eyes: Pupils are equal, round, and reactive to light. Conjunctivae and EOM are normal. Right eye exhibits no discharge. Left eye exhibits no discharge.   Neck: Normal range of motion. Neck supple. No thyromegaly present.   Cardiovascular: Normal rate, regular rhythm, normal heart sounds and intact distal pulses. Exam reveals no gallop and no friction rub.   No murmur heard.  Pulmonary/Chest: Effort normal. No respiratory distress. She has no wheezes. She has rales (Few scattered rhonchi bilaterally). She exhibits no tenderness.   Abdominal: Soft. Bowel sounds are normal. She exhibits no distension. There is no tenderness. There is no rebound and no guarding.   Musculoskeletal: Normal range of motion. She exhibits no edema, tenderness or deformity.    Lymphadenopathy:     She has no cervical adenopathy.   Neurological: She is alert and oriented to person, place, and time.   Skin: Skin is warm and dry. Capillary refill takes less than 2 seconds. No rash noted. No erythema.   Few patchy ecchymosis in the forearms bilaterally   Psychiatric: She has a normal mood and affect. Judgment and thought content normal.   Nursing note and vitals reviewed.      Assessment:       1. Routine gynecological examination    2. Bronchitis    3. Sinusitis, unspecified chronicity, unspecified location    4. Anxiety    5. Ecchymosis of forearm    6. Encounter for lipid screening for cardiovascular disease        Plan:       Routine gynecological examination  -     Ambulatory referral to Obstetrics / Gynecology    Bronchitis  -     albuterol (PROVENTIL/VENTOLIN HFA) 90 mcg/actuation inhaler; Inhale 2 puffs into the lungs every 6 (six) hours as needed for Wheezing. Rescue  Dispense: 18 g; Refill: 1  -     X-Ray Chest PA And Lateral; Future; Expected date: 01/06/2020  -     azithromycin (Z-LOWELL) 250 MG tablet; 2 tabs by mouth day 1, then 1 tab by mouth daily x 4 days  Dispense: 6 tablet; Refill: 0  -     triamcinolone acetonide injection 60 mg  -     benzonatate (TESSALON) 200 MG capsule; Take 1 capsule (200 mg total) by mouth 3 (three) times daily as needed for Cough.  Dispense: 30 capsule; Refill: 0  -     guaiFENesin (MUCINEX) 1,200 mg Ta12; Take 1,200 mg by mouth 2 (two) times daily as needed.  Dispense: 30 tablet; Refill: 1  -     Comprehensive metabolic panel; Future; Expected date: 01/07/2020    Sinusitis, unspecified chronicity, unspecified location  -     fluticasone propionate (FLONASE) 50 mcg/actuation nasal spray; 1 spray (50 mcg total) by Each Nostril route once daily.  Dispense: 9.9 mL; Refill: 1  -     azithromycin (Z-LOWELL) 250 MG tablet; 2 tabs by mouth day 1, then 1 tab by mouth daily x 4 days  Dispense: 6 tablet; Refill: 0    Anxiety  -     ALPRAZolam (XANAX) 0.5 MG  tablet; Take 1 tablet (0.5 mg total) by mouth nightly as needed.  Dispense: 30 tablet; Refill: 0    Ecchymosis of forearm  -     CBC auto differential; Future; Expected date: 01/07/2020  -     Protime-INR; Future; Expected date: 01/07/2020    Encounter for lipid screening for cardiovascular disease  -     Lipid panel; Future; Expected date: 01/07/2020

## 2020-01-06 NOTE — PROGRESS NOTES
Two patient identifiers verified.  Allergies reviewed.  Kenalog 60mg given to LT upper outer quad qluteus, per MD order advise patient to wait 15min after shot is given for any adverse reaction.

## 2020-01-24 ENCOUNTER — PATIENT MESSAGE (OUTPATIENT)
Dept: PRIMARY CARE CLINIC | Facility: CLINIC | Age: 65
End: 2020-01-24

## 2020-01-25 ENCOUNTER — PATIENT MESSAGE (OUTPATIENT)
Dept: PRIMARY CARE CLINIC | Facility: CLINIC | Age: 65
End: 2020-01-25

## 2020-01-27 ENCOUNTER — TELEPHONE (OUTPATIENT)
Dept: PRIMARY CARE CLINIC | Facility: CLINIC | Age: 65
End: 2020-01-27

## 2020-01-27 ENCOUNTER — PATIENT MESSAGE (OUTPATIENT)
Dept: PRIMARY CARE CLINIC | Facility: CLINIC | Age: 65
End: 2020-01-27

## 2020-02-27 ENCOUNTER — CLINICAL SUPPORT (OUTPATIENT)
Dept: CARDIOLOGY | Facility: CLINIC | Age: 65
End: 2020-02-27
Payer: MEDICARE

## 2020-02-27 DIAGNOSIS — Z95.810 AICD (AUTOMATIC CARDIOVERTER/DEFIBRILLATOR) PRESENT: Primary | ICD-10-CM

## 2020-02-27 PROCEDURE — 93295 DEV INTERROG REMOTE 1/2/MLT: CPT | Mod: S$GLB,,, | Performed by: INTERNAL MEDICINE

## 2020-02-27 PROCEDURE — 93295 PR INTERROG EVAL, REMOTE, UP TO 90 DAYS,CARDVERT/DEFIB: ICD-10-PCS | Mod: S$GLB,,, | Performed by: INTERNAL MEDICINE

## 2020-02-27 NOTE — PROGRESS NOTES
Subjective:      Patient ID: Odette Hart is a 64 y.o. female.    Chief Complaint: No chief complaint on file.    HPI:    ROS [employer] remote AICD interrogation report downloaded and interpreted by me and full report scanned into Epic media.  Battery OK with JOSE 7 years. Leads OK. 30% atrial pacing  No ventricular pacing.  Nonsustained events noted for which there is no rhythm strip provided.  Normal device function.    Past Medical History:   Diagnosis Date    Acute coronary syndrome     Allergy     Arthritis     Cardiomyopathy     CHF (congestive heart failure)     Coronary artery disease     Diverticulitis     Diverticulosis     Heart attack     Heart disease     Hyperlipidemia     Hypertension     ICD (implantable cardioverter-defibrillator) in place         Past Surgical History:   Procedure Laterality Date    ATRIAL CARDIAC PACEMAKER INSERTION       SECTION      CORONARY STENT PLACEMENT         Family History   Problem Relation Age of Onset    Heart disease Mother     Emphysema Father     Heart attack Brother        Social History     Socioeconomic History    Marital status:      Spouse name: Not on file    Number of children: Not on file    Years of education: Not on file    Highest education level: Not on file   Occupational History    Not on file   Social Needs    Financial resource strain: Not on file    Food insecurity:     Worry: Not on file     Inability: Not on file    Transportation needs:     Medical: Not on file     Non-medical: Not on file   Tobacco Use    Smoking status: Former Smoker     Packs/day: 0.50     Start date: 1976     Last attempt to quit: 2012     Years since quittin.2    Smokeless tobacco: Never Used   Substance and Sexual Activity    Alcohol use: No    Drug use: No    Sexual activity: Not on file   Lifestyle    Physical activity:     Days per week: Not on file     Minutes per session: Not on file     Stress: Not on file   Relationships    Social connections:     Talks on phone: Not on file     Gets together: Not on file     Attends Buddhism service: Not on file     Active member of club or organization: Not on file     Attends meetings of clubs or organizations: Not on file     Relationship status: Not on file   Other Topics Concern    Not on file   Social History Narrative    Not on file       Current Outpatient Medications on File Prior to Visit   Medication Sig Dispense Refill    albuterol (PROVENTIL/VENTOLIN HFA) 90 mcg/actuation inhaler Inhale 2 puffs into the lungs every 6 (six) hours as needed for Wheezing. Rescue 18 g 1    ALPRAZolam (XANAX) 0.5 MG tablet Take 1 tablet (0.5 mg total) by mouth nightly as needed. 30 tablet 0    aspirin (ECOTRIN) 81 MG EC tablet Take 81 mg by mouth once daily.      carvedilol (COREG) 12.5 MG tablet Take 1 tablet (12.5 mg total) by mouth 2 (two) times daily. 180 tablet 3    citalopram (CELEXA) 20 MG tablet Take 1 tablet (20 mg total) by mouth once daily. 90 tablet 3    ezetimibe (ZETIA) 10 mg tablet Take 1 tablet (10 mg total) by mouth once daily. 90 tablet 3    fluticasone propionate (FLONASE) 50 mcg/actuation nasal spray 1 spray (50 mcg total) by Each Nostril route once daily. 9.9 mL 1    guaifenesin-codeine 100-10 mg/5 ml (TUSSI-ORGANIDIN NR)  mg/5 mL syrup Take 5 mLs by mouth every 6 (six) hours as needed. 120 mL 0    omeprazole (PRILOSEC) 40 MG capsule Take 1 capsule (40 mg total) by mouth once daily. 30 capsule 5     No current facility-administered medications on file prior to visit.        Review of patient's allergies indicates:   Allergen Reactions    Augmentin [amoxicillin-pot clavulanate] Swelling     Not allergic to amoxicillin just a derivative of augmentin    Lisinopril Other (See Comments)     Fluid around heart    Losartan Other (See Comments)     High potassium    Shellfish containing products Anaphylaxis     Pt.states she is allergic  to SEAFOOD since the age of 13    Statins-hmg-coa reductase inhibitors Other (See Comments)     Chest pains    Levaquin [levofloxacin] Other (See Comments)     Very bad joint pain    Clindamycin Palpitations     Chest pain     Objective:   There were no vitals filed for this visit.     Physical Exam     Assessment:     1. AICD (automatic cardioverter/defibrillator) present      Plan:   Diagnoses and all orders for this visit:    AICD (automatic cardioverter/defibrillator) present         No follow-ups on file.

## 2020-03-16 DIAGNOSIS — E66.3 OVERWEIGHT (BMI 25.0-29.9): ICD-10-CM

## 2020-03-16 DIAGNOSIS — Z98.61 S/P PTCA (PERCUTANEOUS TRANSLUMINAL CORONARY ANGIOPLASTY): ICD-10-CM

## 2020-03-16 DIAGNOSIS — I25.2 OLD MI (MYOCARDIAL INFARCTION): ICD-10-CM

## 2020-03-17 RX ORDER — OMEPRAZOLE 40 MG/1
CAPSULE, DELAYED RELEASE ORAL
Qty: 90 CAPSULE | Refills: 0 | Status: SHIPPED | OUTPATIENT
Start: 2020-03-17 | End: 2020-06-14 | Stop reason: SDUPTHER

## 2020-03-31 ENCOUNTER — TELEPHONE (OUTPATIENT)
Dept: CARDIOLOGY | Facility: CLINIC | Age: 65
End: 2020-03-31

## 2020-03-31 NOTE — TELEPHONE ENCOUNTER
----- Message from Radha Paez sent at 3/31/2020 11:18 AM CDT -----  Contact: pt @ [phone]  Returning a missed call from Dr. Ann's office, please call.

## 2020-04-22 ENCOUNTER — OFFICE VISIT (OUTPATIENT)
Dept: PRIMARY CARE CLINIC | Facility: CLINIC | Age: 65
End: 2020-04-22
Payer: MEDICARE

## 2020-04-22 DIAGNOSIS — K57.92 DIVERTICULITIS: Primary | ICD-10-CM

## 2020-04-22 DIAGNOSIS — R11.0 NAUSEA: ICD-10-CM

## 2020-04-22 PROCEDURE — 99213 PR OFFICE/OUTPT VISIT, EST, LEVL III, 20-29 MIN: ICD-10-PCS | Mod: 95,,, | Performed by: INTERNAL MEDICINE

## 2020-04-22 PROCEDURE — 99213 OFFICE O/P EST LOW 20 MIN: CPT | Mod: 95,,, | Performed by: INTERNAL MEDICINE

## 2020-04-22 RX ORDER — PROMETHAZINE HYDROCHLORIDE 25 MG/1
25 TABLET ORAL EVERY 6 HOURS PRN
Qty: 15 TABLET | Refills: 1 | Status: SHIPPED | OUTPATIENT
Start: 2020-04-22 | End: 2021-03-09

## 2020-04-22 RX ORDER — CIPROFLOXACIN 500 MG/1
500 TABLET ORAL EVERY 12 HOURS
Qty: 20 TABLET | Refills: 0 | Status: SHIPPED | OUTPATIENT
Start: 2020-04-22 | End: 2020-06-14 | Stop reason: ALTCHOICE

## 2020-04-22 RX ORDER — METRONIDAZOLE 500 MG/1
500 TABLET ORAL EVERY 8 HOURS
Qty: 30 TABLET | Refills: 1 | Status: ON HOLD | OUTPATIENT
Start: 2020-04-22 | End: 2021-01-15 | Stop reason: SDUPTHER

## 2020-04-22 NOTE — PROGRESS NOTES
Subjective:    The patient location is: home [address]  The chief complaint leading to consultation is:abd pain  Visit type: audiovisual  Total time spent with patient: 12 minutes  Each patient to whom he or she provides medical services by telemedicine is:  (1) informed of the relationship between the physician and patient and the respective role of any other health care provider with respect to management of the patient; and (2) notified that he or she may decline to receive medical services by telemedicine and may withdraw from such care at any time.    Notes:    Patient ID: Odette Hart is a 64 y.o. female.   Patient was seen by telemedicine physical exam limited vital signs not available  Chief Complaint: No chief complaint on file.    HPI  patient states she begin to feel bad since Monday last week on Wednesday begin to have abdominal pain in the left lower quadrant see begin liquid diet since the it help the last time but she continued to have abdominal pain she took some old  antibiotic on 2018 on Monday but her abdominal pain persists she took Flagyl in the past but had to take medicine for nausea in able to tolerate the medication she states that she can take amoxil without any reaction she only allergic to Augmentin cause finger swelling she deny fever short of breath chest pain dyspnea with exertion  Review of Systems    Objective:      Physical Exam   Constitutional: She is oriented to person, place, and time. She appears well-developed and well-nourished. No distress.   Eyes: EOM are normal.   Neck: Neck supple.   Pulmonary/Chest: Effort normal.   Abdominal: There is tenderness (Patient states she is tender in the left lower quadrant with palpation).   Neurological: She is alert and oriented to person, place, and time.   Psychiatric: She has a normal mood and affect. Her behavior is normal. Judgment and thought content normal.       Assessment:       1. Diverticulitis    2. Nausea        Plan:        Diverticulitis  Comments:  Discussed with patient if not better in 2 days knees blood test CT scan of the abdomen pelvis on need to go to the emergency room  Orders:  -     metroNIDAZOLE (FLAGYL) 500 MG tablet; Take 1 tablet (500 mg total) by mouth every 8 (eight) hours.  Dispense: 30 tablet; Refill: 1  -     ciprofloxacin HCl (CIPRO) 500 MG tablet; Take 1 tablet (500 mg total) by mouth every 12 (twelve) hours.  Dispense: 20 tablet; Refill: 0    Nausea  -     promethazine (PHENERGAN) 25 MG tablet; Take 1 tablet (25 mg total) by mouth every 6 (six) hours as needed for Nausea.  Dispense: 15 tablet; Refill: 1

## 2020-06-04 ENCOUNTER — CLINICAL SUPPORT (OUTPATIENT)
Dept: CARDIOLOGY | Facility: CLINIC | Age: 65
End: 2020-06-04
Payer: MEDICARE

## 2020-06-04 DIAGNOSIS — Z95.810 AICD (AUTOMATIC CARDIOVERTER/DEFIBRILLATOR) PRESENT: Primary | ICD-10-CM

## 2020-06-04 PROCEDURE — 99499 NO LOS: ICD-10-PCS | Mod: S$GLB,,, | Performed by: INTERNAL MEDICINE

## 2020-06-04 PROCEDURE — 93296 REM INTERROG EVL PM/IDS: CPT | Mod: S$GLB,,, | Performed by: INTERNAL MEDICINE

## 2020-06-04 PROCEDURE — 93295 PR INTERROG EVAL, REMOTE, UP TO 90 DAYS,CARDVERT/DEFIB: ICD-10-PCS | Mod: S$GLB,,, | Performed by: INTERNAL MEDICINE

## 2020-06-04 PROCEDURE — 99499 UNLISTED E&M SERVICE: CPT | Mod: S$GLB,,, | Performed by: INTERNAL MEDICINE

## 2020-06-04 PROCEDURE — 93295 DEV INTERROG REMOTE 1/2/MLT: CPT | Mod: S$GLB,,, | Performed by: INTERNAL MEDICINE

## 2020-06-04 PROCEDURE — 93296 PR INTERROG EVAL, REMOTE, UP TO 90 DAYS, PACER/DEFIB,TECH REVIEW: ICD-10-PCS | Mod: S$GLB,,, | Performed by: INTERNAL MEDICINE

## 2020-06-04 NOTE — PROGRESS NOTES
Subjective:      Patient ID: Odette Hart is a 64 y.o. female.    Chief Complaint: No chief complaint on file.    HPI:    ROS  [employer] remote AICD interrogation report downloaded and prepared by medical assistant and interpreted by me and full report scanned into Epic media.  Battery OK .. Leads OK.  Normal device function.    Past Medical History:   Diagnosis Date    Acute coronary syndrome     Allergy     Arthritis     Cardiomyopathy     CHF (congestive heart failure)     Coronary artery disease     Diverticulitis     Diverticulosis     Heart attack     Heart disease     Hyperlipidemia     Hypertension     ICD (implantable cardioverter-defibrillator) in place         Past Surgical History:   Procedure Laterality Date    ATRIAL CARDIAC PACEMAKER INSERTION       SECTION      CORONARY STENT PLACEMENT         Family History   Problem Relation Age of Onset    Heart disease Mother     Emphysema Father     Heart attack Brother        Social History     Socioeconomic History    Marital status:      Spouse name: Not on file    Number of children: Not on file    Years of education: Not on file    Highest education level: Not on file   Occupational History    Not on file   Social Needs    Financial resource strain: Not on file    Food insecurity:     Worry: Not on file     Inability: Not on file    Transportation needs:     Medical: Not on file     Non-medical: Not on file   Tobacco Use    Smoking status: Former Smoker     Packs/day: 0.50     Start date: 1976     Last attempt to quit: 2012     Years since quittin.5    Smokeless tobacco: Never Used   Substance and Sexual Activity    Alcohol use: No    Drug use: No    Sexual activity: Not on file   Lifestyle    Physical activity:     Days per week: Not on file     Minutes per session: Not on file    Stress: Not on file   Relationships    Social connections:     Talks on phone: Not on file     Gets  together: Not on file     Attends Christian service: Not on file     Active member of club or organization: Not on file     Attends meetings of clubs or organizations: Not on file     Relationship status: Not on file   Other Topics Concern    Not on file   Social History Narrative    Not on file       Current Outpatient Medications on File Prior to Visit   Medication Sig Dispense Refill    albuterol (PROVENTIL/VENTOLIN HFA) 90 mcg/actuation inhaler Inhale 2 puffs into the lungs every 6 (six) hours as needed for Wheezing. Rescue 18 g 1    ALPRAZolam (XANAX) 0.5 MG tablet Take 1 tablet (0.5 mg total) by mouth nightly as needed. 30 tablet 0    aspirin (ECOTRIN) 81 MG EC tablet Take 81 mg by mouth once daily.      carvedilol (COREG) 12.5 MG tablet Take 1 tablet (12.5 mg total) by mouth 2 (two) times daily. 180 tablet 3    ciprofloxacin HCl (CIPRO) 500 MG tablet Take 1 tablet (500 mg total) by mouth every 12 (twelve) hours. 20 tablet 0    citalopram (CELEXA) 20 MG tablet Take 1 tablet (20 mg total) by mouth once daily. 90 tablet 3    ezetimibe (ZETIA) 10 mg tablet Take 1 tablet (10 mg total) by mouth once daily. 90 tablet 3    fluticasone propionate (FLONASE) 50 mcg/actuation nasal spray 1 spray (50 mcg total) by Each Nostril route once daily. 9.9 mL 1    guaifenesin-codeine 100-10 mg/5 ml (TUSSI-ORGANIDIN NR)  mg/5 mL syrup Take 5 mLs by mouth every 6 (six) hours as needed. 120 mL 0    metroNIDAZOLE (FLAGYL) 500 MG tablet Take 1 tablet (500 mg total) by mouth every 8 (eight) hours. 30 tablet 1    omeprazole (PRILOSEC) 40 MG capsule Take 1 capsule by mouth once daily 90 capsule 0    promethazine (PHENERGAN) 25 MG tablet Take 1 tablet (25 mg total) by mouth every 6 (six) hours as needed for Nausea. 15 tablet 1     No current facility-administered medications on file prior to visit.        Review of patient's allergies indicates:   Allergen Reactions    Augmentin [amoxicillin-pot clavulanate] Swelling      Not allergic to amoxicillin just a derivative of augmentin    Lisinopril Other (See Comments)     Fluid around heart    Losartan Other (See Comments)     High potassium    Shellfish containing products Anaphylaxis     Pt.states she is allergic to SEAFOOD since the age of 13    Statins-hmg-coa reductase inhibitors Other (See Comments)     Chest pains    Levaquin [levofloxacin] Other (See Comments)     Very bad joint pain    Clindamycin Palpitations     Chest pain     Objective:   There were no vitals filed for this visit.     Physical Exam     Assessment:     1. AICD (automatic cardioverter/defibrillator) present      Plan:   Diagnoses and all orders for this visit:    AICD (automatic cardioverter/defibrillator) present         No follow-ups on file.

## 2020-06-13 ENCOUNTER — PATIENT MESSAGE (OUTPATIENT)
Dept: PRIMARY CARE CLINIC | Facility: CLINIC | Age: 65
End: 2020-06-13

## 2020-06-14 ENCOUNTER — OFFICE VISIT (OUTPATIENT)
Dept: PRIMARY CARE CLINIC | Facility: CLINIC | Age: 65
End: 2020-06-14
Payer: MEDICARE

## 2020-06-14 DIAGNOSIS — Z12.31 ENCOUNTER FOR SCREENING MAMMOGRAM FOR BREAST CANCER: ICD-10-CM

## 2020-06-14 DIAGNOSIS — Z98.61 S/P PTCA (PERCUTANEOUS TRANSLUMINAL CORONARY ANGIOPLASTY): ICD-10-CM

## 2020-06-14 DIAGNOSIS — I50.30 CONGESTIVE HEART FAILURE WITH LEFT VENTRICULAR DIASTOLIC DYSFUNCTION, NYHA CLASS 2: Primary | ICD-10-CM

## 2020-06-14 DIAGNOSIS — Z23 ENCOUNTER FOR VACCINATION: ICD-10-CM

## 2020-06-14 DIAGNOSIS — Z13.6 ENCOUNTER FOR LIPID SCREENING FOR CARDIOVASCULAR DISEASE: ICD-10-CM

## 2020-06-14 DIAGNOSIS — E66.3 OVERWEIGHT (BMI 25.0-29.9): ICD-10-CM

## 2020-06-14 DIAGNOSIS — Z01.419 ROUTINE GYNECOLOGICAL EXAMINATION: ICD-10-CM

## 2020-06-14 DIAGNOSIS — J32.9 SINUSITIS, UNSPECIFIED CHRONICITY, UNSPECIFIED LOCATION: ICD-10-CM

## 2020-06-14 DIAGNOSIS — I25.2 OLD MI (MYOCARDIAL INFARCTION): ICD-10-CM

## 2020-06-14 DIAGNOSIS — Z13.220 ENCOUNTER FOR LIPID SCREENING FOR CARDIOVASCULAR DISEASE: ICD-10-CM

## 2020-06-14 DIAGNOSIS — J40 BRONCHITIS: ICD-10-CM

## 2020-06-14 DIAGNOSIS — F41.9 ANXIETY: ICD-10-CM

## 2020-06-14 PROCEDURE — 99213 PR OFFICE/OUTPT VISIT, EST, LEVL III, 20-29 MIN: ICD-10-PCS | Mod: 95,,, | Performed by: INTERNAL MEDICINE

## 2020-06-14 PROCEDURE — 99499 RISK ADDL DX/OHS AUDIT: ICD-10-PCS | Mod: 95,,, | Performed by: INTERNAL MEDICINE

## 2020-06-14 PROCEDURE — 99499 UNLISTED E&M SERVICE: CPT | Mod: 95,,, | Performed by: INTERNAL MEDICINE

## 2020-06-14 PROCEDURE — 99213 OFFICE O/P EST LOW 20 MIN: CPT | Mod: 95,,, | Performed by: INTERNAL MEDICINE

## 2020-06-14 RX ORDER — ALPRAZOLAM 0.5 MG/1
0.5 TABLET ORAL NIGHTLY PRN
Qty: 30 TABLET | Refills: 0 | Status: SHIPPED | OUTPATIENT
Start: 2020-06-14 | End: 2021-01-26 | Stop reason: SDUPTHER

## 2020-06-14 RX ORDER — CITALOPRAM 20 MG/1
20 TABLET, FILM COATED ORAL DAILY
Qty: 90 TABLET | Refills: 3 | Status: SHIPPED | OUTPATIENT
Start: 2020-06-14 | End: 2021-01-26 | Stop reason: SDUPTHER

## 2020-06-14 RX ORDER — CARVEDILOL 12.5 MG/1
12.5 TABLET ORAL 2 TIMES DAILY
Qty: 180 TABLET | Refills: 3 | Status: SHIPPED | OUTPATIENT
Start: 2020-06-14 | End: 2021-01-26 | Stop reason: SDUPTHER

## 2020-06-14 RX ORDER — ALBUTEROL SULFATE 90 UG/1
2 AEROSOL, METERED RESPIRATORY (INHALATION) EVERY 6 HOURS PRN
Qty: 18 G | Refills: 1 | Status: SHIPPED | OUTPATIENT
Start: 2020-06-14 | End: 2021-01-26 | Stop reason: SDUPTHER

## 2020-06-14 RX ORDER — OMEPRAZOLE 40 MG/1
40 CAPSULE, DELAYED RELEASE ORAL DAILY
Qty: 90 CAPSULE | Refills: 2 | Status: SHIPPED | OUTPATIENT
Start: 2020-06-14 | End: 2021-01-26 | Stop reason: SDUPTHER

## 2020-06-15 RX ORDER — ZOSTER VACCINE RECOMBINANT, ADJUVANTED 50 MCG/0.5
0.5 KIT INTRAMUSCULAR ONCE
Qty: 0.5 ML | Refills: 0 | Status: SHIPPED | OUTPATIENT
Start: 2020-06-15 | End: 2020-06-15

## 2020-06-15 NOTE — PROGRESS NOTES
The patient location is: home  The chief complaint leading to consultation is: refill    Visit type: audiovisual   Subjective:       Patient ID: Odette Hart is a 64 y.o. female.  Patient was seen by telemedicine physical exam limited vital signs not available  Chief Complaint: No chief complaint on file.    HPI  patient visit today is to refill medications  physically patient is doing well no physical complain she has been home due to corona virus endemic she denies short breath chest pain dyspnea with exertion she still has chronic anxiety that see had to take medication on and off as needed she has not had mammogram and Pap smear for few years she refuse mammogram states that see doing well does not feel any abnormality she recently had  diverticulitis resolved with oral antibiotics  Review of Systems    Objective:      Physical Exam  Constitutional:       General: She is not in acute distress.     Appearance: Normal appearance. She is normal weight.   HENT:      Head: Atraumatic.   Eyes:      Extraocular Movements: Extraocular movements intact.   Pulmonary:      Effort: Pulmonary effort is normal.   Abdominal:      Tenderness: There is no abdominal tenderness.   Neurological:      General: No focal deficit present.      Mental Status: She is alert and oriented to person, place, and time.   Psychiatric:         Mood and Affect: Mood normal.         Thought Content: Thought content normal.         Judgment: Judgment normal.         Assessment:       1. Congestive heart failure with left ventricular diastolic dysfunction, NYHA class 2    2. Overweight (BMI 25.0-29.9)    3. S/P PTCA (percutaneous transluminal coronary angioplasty)    4. Old MI (myocardial infarction)    5. Anxiety    6. Sinusitis, unspecified chronicity, unspecified location    7. Bronchitis    8. Encounter for lipid screening for cardiovascular disease    9. Encounter for screening mammogram for breast cancer    10. Routine gynecological  examination    11. Encounter for vaccination        Plan:       Congestive heart failure with left ventricular diastolic dysfunction, NYHA class 2  -     carvediloL (COREG) 12.5 MG tablet; Take 1 tablet (12.5 mg total) by mouth 2 (two) times daily.  Dispense: 180 tablet; Refill: 3  -     CBC auto differential; Future; Expected date: 06/15/2020  -     Comprehensive metabolic panel; Future; Expected date: 06/15/2020  -     POCT urine dipstick without microscope    Overweight (BMI 25.0-29.9)  -     omeprazole (PRILOSEC) 40 MG capsule; Take 1 capsule (40 mg total) by mouth once daily.  Dispense: 90 capsule; Refill: 2    S/P PTCA (percutaneous transluminal coronary angioplasty)  -     omeprazole (PRILOSEC) 40 MG capsule; Take 1 capsule (40 mg total) by mouth once daily.  Dispense: 90 capsule; Refill: 2    Old MI (myocardial infarction)  -     omeprazole (PRILOSEC) 40 MG capsule; Take 1 capsule (40 mg total) by mouth once daily.  Dispense: 90 capsule; Refill: 2    Anxiety  -     ALPRAZolam (XANAX) 0.5 MG tablet; Take 1 tablet (0.5 mg total) by mouth nightly as needed.  Dispense: 30 tablet; Refill: 0  -     citalopram (CELEXA) 20 MG tablet; Take 1 tablet (20 mg total) by mouth once daily.  Dispense: 90 tablet; Refill: 3    Sinusitis, unspecified chronicity, unspecified location    Bronchitis  -     albuterol (PROVENTIL/VENTOLIN HFA) 90 mcg/actuation inhaler; Inhale 2 puffs into the lungs every 6 (six) hours as needed for Wheezing. Rescue  Dispense: 18 g; Refill: 1    Encounter for lipid screening for cardiovascular disease  -     Lipid Panel; Future; Expected date: 06/15/2020    Encounter for screening mammogram for breast cancer  -     Mammo Digital Screening Bilat without CA; Future; Expected date: 06/15/2020    Routine gynecological examination  -     Ambulatory referral/consult to Obstetrics / Gynecology; Future; Expected date: 06/22/2020    Encounter for vaccination  -     varicella-zoster [id number], PF, (SHINGRIX,  PF,) 50 mcg/0.5 mL injection; Inject 0.5 mLs into the muscle once. for 1 dose  Dispense: 0.5 mL; Refill: 0          Face to Face time with patient: 10 minutes  15 minutes of total time spent on the encounter, which includes face to face time and non-face to face time preparing to see the patient (eg, review of tests), Obtaining and/or reviewing separately obtained history, Documenting clinical information in the electronic or other health record, Independently interpreting results (not separately reported) and communicating results to the patient/family/caregiver, or Care coordination (not separately reported).         Each patient to whom he or she provides medical services by telemedicine is:  (1) informed of the relationship between the physician and patient and the respective role of any other health care provider with respect to management of the patient; and (2) notified that he or she may decline to receive medical services by telemedicine and may withdraw from such care at any time.    Notes:

## 2020-08-31 ENCOUNTER — CLINICAL SUPPORT (OUTPATIENT)
Dept: CARDIOLOGY | Facility: CLINIC | Age: 65
End: 2020-08-31
Payer: MEDICARE

## 2020-08-31 DIAGNOSIS — Z95.810 AICD (AUTOMATIC CARDIOVERTER/DEFIBRILLATOR) PRESENT: Primary | ICD-10-CM

## 2020-08-31 PROCEDURE — 93295 DEV INTERROG REMOTE 1/2/MLT: CPT | Mod: S$GLB,,, | Performed by: INTERNAL MEDICINE

## 2020-08-31 PROCEDURE — 93295 PR INTERROG EVAL, REMOTE, UP TO 90 DAYS,CARDVERT/DEFIB: ICD-10-PCS | Mod: S$GLB,,, | Performed by: INTERNAL MEDICINE

## 2020-08-31 PROCEDURE — 93296 REM INTERROG EVL PM/IDS: CPT | Mod: S$GLB,,, | Performed by: INTERNAL MEDICINE

## 2020-08-31 PROCEDURE — 93296 PR INTERROG EVAL, REMOTE, UP TO 90 DAYS, PACER/DEFIB,TECH REVIEW: ICD-10-PCS | Mod: S$GLB,,, | Performed by: INTERNAL MEDICINE

## 2020-09-01 NOTE — PROGRESS NOTES
Subjective:      Patient ID: Odette Hart is a 64 y.o. female.    Chief Complaint: No chief complaint on file.    HPI:    ROS [employer] remote interrogation of AICD report downloaded and prepared by medical assistant and interpreted by me and full report scanned into0 Epic media. Battery OK with JOSE 6.5 years.  Leads OK  No therapy.  Nonsustained VT monitored.  Normal device function.    Past Medical History:   Diagnosis Date    Acute coronary syndrome     Allergy     Arthritis     Cardiomyopathy     CHF (congestive heart failure)     Coronary artery disease     Diverticulitis     Diverticulosis     Heart attack     Heart disease     Hyperlipidemia     Hypertension     ICD (implantable cardioverter-defibrillator) in place         Past Surgical History:   Procedure Laterality Date    ATRIAL CARDIAC PACEMAKER INSERTION       SECTION      CORONARY STENT PLACEMENT         Family History   Problem Relation Age of Onset    Heart disease Mother     Emphysema Father     Heart attack Brother        Social History     Socioeconomic History    Marital status:      Spouse name: Not on file    Number of children: Not on file    Years of education: Not on file    Highest education level: Not on file   Occupational History    Not on file   Social Needs    Financial resource strain: Not on file    Food insecurity     Worry: Not on file     Inability: Not on file    Transportation needs     Medical: Not on file     Non-medical: Not on file   Tobacco Use    Smoking status: Former Smoker     Packs/day: 0.50     Start date: 1976     Quit date: 2012     Years since quittin.7    Smokeless tobacco: Never Used   Substance and Sexual Activity    Alcohol use: No    Drug use: No    Sexual activity: Not on file   Lifestyle    Physical activity     Days per week: Not on file     Minutes per session: Not on file    Stress: Not on file   Relationships    Social  connections     Talks on phone: Not on file     Gets together: Not on file     Attends Orthodox service: Not on file     Active member of club or organization: Not on file     Attends meetings of clubs or organizations: Not on file     Relationship status: Not on file   Other Topics Concern    Not on file   Social History Narrative    Not on file       Current Outpatient Medications on File Prior to Visit   Medication Sig Dispense Refill    albuterol (PROVENTIL/VENTOLIN HFA) 90 mcg/actuation inhaler Inhale 2 puffs into the lungs every 6 (six) hours as needed for Wheezing. Rescue 18 g 1    ALPRAZolam (XANAX) 0.5 MG tablet Take 1 tablet (0.5 mg total) by mouth nightly as needed. 30 tablet 0    aspirin (ECOTRIN) 81 MG EC tablet Take 81 mg by mouth once daily.      carvediloL (COREG) 12.5 MG tablet Take 1 tablet (12.5 mg total) by mouth 2 (two) times daily. 180 tablet 3    citalopram (CELEXA) 20 MG tablet Take 1 tablet (20 mg total) by mouth once daily. 90 tablet 3    ezetimibe (ZETIA) 10 mg tablet Take 1 tablet (10 mg total) by mouth once daily. 90 tablet 3    fluticasone propionate (FLONASE) 50 mcg/actuation nasal spray 1 spray (50 mcg total) by Each Nostril route once daily. 9.9 mL 1    guaifenesin-codeine 100-10 mg/5 ml (TUSSI-ORGANIDIN NR)  mg/5 mL syrup Take 5 mLs by mouth every 6 (six) hours as needed. 120 mL 0    metroNIDAZOLE (FLAGYL) 500 MG tablet Take 1 tablet (500 mg total) by mouth every 8 (eight) hours. 30 tablet 1    omeprazole (PRILOSEC) 40 MG capsule Take 1 capsule (40 mg total) by mouth once daily. 90 capsule 2    promethazine (PHENERGAN) 25 MG tablet Take 1 tablet (25 mg total) by mouth every 6 (six) hours as needed for Nausea. 15 tablet 1     No current facility-administered medications on file prior to visit.        Review of patient's allergies indicates:   Allergen Reactions    Augmentin [amoxicillin-pot clavulanate] Swelling     Not allergic to amoxicillin just a derivative  of augmentin    Lisinopril Other (See Comments)     Fluid around heart    Losartan Other (See Comments)     High potassium    Shellfish containing products Anaphylaxis     Pt.states she is allergic to SEAFOOD since the age of 13    Statins-hmg-coa reductase inhibitors Other (See Comments)     Chest pains    Levaquin [levofloxacin] Other (See Comments)     Very bad joint pain    Clindamycin Palpitations     Chest pain     Objective:   There were no vitals filed for this visit.     Physical Exam     Assessment:     1. AICD (automatic cardioverter/defibrillator) present      Plan:   Diagnoses and all orders for this visit:    AICD (automatic cardioverter/defibrillator) present         No follow-ups on file.

## 2020-10-05 ENCOUNTER — PATIENT MESSAGE (OUTPATIENT)
Dept: ADMINISTRATIVE | Facility: HOSPITAL | Age: 65
End: 2020-10-05

## 2020-12-04 ENCOUNTER — CLINICAL SUPPORT (OUTPATIENT)
Dept: CARDIOLOGY | Facility: CLINIC | Age: 65
End: 2020-12-04
Payer: MEDICARE

## 2020-12-04 DIAGNOSIS — Z95.810 AICD (AUTOMATIC CARDIOVERTER/DEFIBRILLATOR) PRESENT: Primary | ICD-10-CM

## 2020-12-04 PROCEDURE — 93295 DEV INTERROG REMOTE 1/2/MLT: CPT | Mod: S$GLB,,, | Performed by: INTERNAL MEDICINE

## 2020-12-04 PROCEDURE — 99499 UNLISTED E&M SERVICE: CPT | Mod: S$GLB,,, | Performed by: INTERNAL MEDICINE

## 2020-12-04 PROCEDURE — 93296 PR INTERROG EVAL, REMOTE, UP TO 90 DAYS, PACER/DEFIB,TECH REVIEW: ICD-10-PCS | Mod: S$GLB,,, | Performed by: INTERNAL MEDICINE

## 2020-12-04 PROCEDURE — 93295 PR INTERROG EVAL, REMOTE, UP TO 90 DAYS,CARDVERT/DEFIB: ICD-10-PCS | Mod: S$GLB,,, | Performed by: INTERNAL MEDICINE

## 2020-12-04 PROCEDURE — 99499 RISK ADDL DX/OHS AUDIT: ICD-10-PCS | Mod: S$GLB,,, | Performed by: INTERNAL MEDICINE

## 2020-12-04 PROCEDURE — 93296 REM INTERROG EVL PM/IDS: CPT | Mod: S$GLB,,, | Performed by: INTERNAL MEDICINE

## 2020-12-04 NOTE — PROGRESS NOTES
Subjective:      Patient ID: Odette Hart is a 65 y.o. female.    Chief Complaint: No chief complaint on file.    HPI:    ROS  Needle remote interrogation of AICD report downloaded and prepared by medical assistant and interpreted by me and full report scanned into Epic media.  Battery ok WITH emily 6 years.  Leads OK.   Events reviewed.  Normal device function      Past Medical History:   Diagnosis Date    Acute coronary syndrome     Allergy     Arthritis     Cardiomyopathy     CHF (congestive heart failure)     Coronary artery disease     Diverticulitis     Diverticulosis     Heart attack     Heart disease     Hyperlipidemia     Hypertension     ICD (implantable cardioverter-defibrillator) in place         Past Surgical History:   Procedure Laterality Date    ATRIAL CARDIAC PACEMAKER INSERTION       SECTION      CORONARY STENT PLACEMENT         Family History   Problem Relation Age of Onset    Heart disease Mother     Emphysema Father     Heart attack Brother        Social History     Socioeconomic History    Marital status:      Spouse name: Not on file    Number of children: Not on file    Years of education: Not on file    Highest education level: Not on file   Occupational History    Not on file   Social Needs    Financial resource strain: Not on file    Food insecurity     Worry: Not on file     Inability: Not on file    Transportation needs     Medical: Not on file     Non-medical: Not on file   Tobacco Use    Smoking status: Former Smoker     Packs/day: 0.50     Start date: 1976     Quit date: 2012     Years since quittin.0    Smokeless tobacco: Never Used   Substance and Sexual Activity    Alcohol use: No    Drug use: No    Sexual activity: Not on file   Lifestyle    Physical activity     Days per week: Not on file     Minutes per session: Not on file    Stress: Not on file   Relationships    Social connections     Talks on  phone: Not on file     Gets together: Not on file     Attends Anabaptism service: Not on file     Active member of club or organization: Not on file     Attends meetings of clubs or organizations: Not on file     Relationship status: Not on file   Other Topics Concern    Not on file   Social History Narrative    Not on file       Current Outpatient Medications on File Prior to Visit   Medication Sig Dispense Refill    albuterol (PROVENTIL/VENTOLIN HFA) 90 mcg/actuation inhaler Inhale 2 puffs into the lungs every 6 (six) hours as needed for Wheezing. Rescue 18 g 1    ALPRAZolam (XANAX) 0.5 MG tablet Take 1 tablet (0.5 mg total) by mouth nightly as needed. 30 tablet 0    aspirin (ECOTRIN) 81 MG EC tablet Take 81 mg by mouth once daily.      carvediloL (COREG) 12.5 MG tablet Take 1 tablet (12.5 mg total) by mouth 2 (two) times daily. 180 tablet 3    citalopram (CELEXA) 20 MG tablet Take 1 tablet (20 mg total) by mouth once daily. 90 tablet 3    ezetimibe (ZETIA) 10 mg tablet Take 1 tablet (10 mg total) by mouth once daily. 90 tablet 3    fluticasone propionate (FLONASE) 50 mcg/actuation nasal spray 1 spray (50 mcg total) by Each Nostril route once daily. 9.9 mL 1    guaifenesin-codeine 100-10 mg/5 ml (TUSSI-ORGANIDIN NR)  mg/5 mL syrup Take 5 mLs by mouth every 6 (six) hours as needed. 120 mL 0    metroNIDAZOLE (FLAGYL) 500 MG tablet Take 1 tablet (500 mg total) by mouth every 8 (eight) hours. 30 tablet 1    omeprazole (PRILOSEC) 40 MG capsule Take 1 capsule (40 mg total) by mouth once daily. 90 capsule 2    promethazine (PHENERGAN) 25 MG tablet Take 1 tablet (25 mg total) by mouth every 6 (six) hours as needed for Nausea. 15 tablet 1     No current facility-administered medications on file prior to visit.        Review of patient's allergies indicates:   Allergen Reactions    Augmentin [amoxicillin-pot clavulanate] Swelling     Not allergic to amoxicillin just a derivative of augmentin     Lisinopril Other (See Comments)     Fluid around heart    Losartan Other (See Comments)     High potassium    Shellfish containing products Anaphylaxis     Pt.states she is allergic to SEAFOOD since the age of 13    Statins-hmg-coa reductase inhibitors Other (See Comments)     Chest pains    Levaquin [levofloxacin] Other (See Comments)     Very bad joint pain    Clindamycin Palpitations     Chest pain     Objective:   There were no vitals filed for this visit.     Physical Exam     Assessment:     1. AICD (automatic cardioverter/defibrillator) present      Plan:   Diagnoses and all orders for this visit:    AICD (automatic cardioverter/defibrillator) present         No follow-ups on file.

## 2021-01-04 ENCOUNTER — PATIENT MESSAGE (OUTPATIENT)
Dept: ADMINISTRATIVE | Facility: HOSPITAL | Age: 66
End: 2021-01-04

## 2021-01-12 PROBLEM — K57.80 DIVERTICULITIS OF INTESTINE WITH ABSCESS: Status: ACTIVE | Noted: 2021-01-12

## 2021-01-21 ENCOUNTER — PATIENT OUTREACH (OUTPATIENT)
Dept: ADMINISTRATIVE | Facility: OTHER | Age: 66
End: 2021-01-21

## 2021-01-21 ENCOUNTER — PATIENT OUTREACH (OUTPATIENT)
Dept: ADMINISTRATIVE | Facility: HOSPITAL | Age: 66
End: 2021-01-21

## 2021-01-25 ENCOUNTER — TELEPHONE (OUTPATIENT)
Dept: CARDIOLOGY | Facility: CLINIC | Age: 66
End: 2021-01-25

## 2021-01-26 ENCOUNTER — OFFICE VISIT (OUTPATIENT)
Dept: PRIMARY CARE CLINIC | Facility: CLINIC | Age: 66
End: 2021-01-26
Payer: MEDICARE

## 2021-01-26 VITALS
SYSTOLIC BLOOD PRESSURE: 132 MMHG | BODY MASS INDEX: 23.96 KG/M2 | OXYGEN SATURATION: 98 % | DIASTOLIC BLOOD PRESSURE: 80 MMHG | TEMPERATURE: 98 F | HEART RATE: 72 BPM | RESPIRATION RATE: 18 BRPM | HEIGHT: 63 IN | WEIGHT: 135.25 LBS

## 2021-01-26 DIAGNOSIS — Z11.4 SCREENING FOR HIV (HUMAN IMMUNODEFICIENCY VIRUS): ICD-10-CM

## 2021-01-26 DIAGNOSIS — Z98.61 S/P PTCA (PERCUTANEOUS TRANSLUMINAL CORONARY ANGIOPLASTY): ICD-10-CM

## 2021-01-26 DIAGNOSIS — K57.32 DIVERTICULITIS OF SIGMOID COLON: Primary | ICD-10-CM

## 2021-01-26 DIAGNOSIS — N32.1 COLOVESICAL FISTULA: ICD-10-CM

## 2021-01-26 DIAGNOSIS — I25.2 OLD MI (MYOCARDIAL INFARCTION): ICD-10-CM

## 2021-01-26 DIAGNOSIS — E66.3 OVERWEIGHT (BMI 25.0-29.9): ICD-10-CM

## 2021-01-26 DIAGNOSIS — J40 BRONCHITIS: ICD-10-CM

## 2021-01-26 DIAGNOSIS — F41.9 ANXIETY: ICD-10-CM

## 2021-01-26 DIAGNOSIS — Z23 NEED FOR VACCINATION: ICD-10-CM

## 2021-01-26 DIAGNOSIS — I50.30 CONGESTIVE HEART FAILURE WITH LEFT VENTRICULAR DIASTOLIC DYSFUNCTION, NYHA CLASS 2: ICD-10-CM

## 2021-01-26 LAB
BILIRUB SERPL-MCNC: NORMAL MG/DL
BLOOD URINE, POC: NORMAL
CLARITY, POC UA: NORMAL
COLOR, POC UA: NORMAL
GLUCOSE UR QL STRIP: NORMAL
KETONES UR QL STRIP: NORMAL
LEUKOCYTE ESTERASE URINE, POC: NORMAL
NITRITE, POC UA: NORMAL
PH, POC UA: 5
PROTEIN, POC: NORMAL
SPECIFIC GRAVITY, POC UA: 1.01
UROBILINOGEN, POC UA: NORMAL

## 2021-01-26 PROCEDURE — 90471 IMMUNIZATION ADMIN: CPT | Mod: S$GLB,,, | Performed by: INTERNAL MEDICINE

## 2021-01-26 PROCEDURE — 90714 TD VACC NO PRESV 7 YRS+ IM: CPT | Mod: S$GLB,,, | Performed by: INTERNAL MEDICINE

## 2021-01-26 PROCEDURE — 99999 PR PBB SHADOW E&M-EST. PATIENT-LVL IV: ICD-10-PCS | Mod: PBBFAC,,, | Performed by: INTERNAL MEDICINE

## 2021-01-26 PROCEDURE — 1126F PR PAIN SEVERITY QUANTIFIED, NO PAIN PRESENT: ICD-10-PCS | Mod: S$GLB,,, | Performed by: INTERNAL MEDICINE

## 2021-01-26 PROCEDURE — 81002 URINALYSIS NONAUTO W/O SCOPE: CPT | Mod: S$GLB,,, | Performed by: INTERNAL MEDICINE

## 2021-01-26 PROCEDURE — 99214 OFFICE O/P EST MOD 30 MIN: CPT | Mod: 25,S$GLB,, | Performed by: INTERNAL MEDICINE

## 2021-01-26 PROCEDURE — 3079F PR MOST RECENT DIASTOLIC BLOOD PRESSURE 80-89 MM HG: ICD-10-PCS | Mod: CPTII,S$GLB,, | Performed by: INTERNAL MEDICINE

## 2021-01-26 PROCEDURE — 3075F PR MOST RECENT SYSTOLIC BLOOD PRESS GE 130-139MM HG: ICD-10-PCS | Mod: CPTII,S$GLB,, | Performed by: INTERNAL MEDICINE

## 2021-01-26 PROCEDURE — 1126F AMNT PAIN NOTED NONE PRSNT: CPT | Mod: S$GLB,,, | Performed by: INTERNAL MEDICINE

## 2021-01-26 PROCEDURE — 81002 POCT URINE DIPSTICK WITHOUT MICROSCOPE: ICD-10-PCS | Mod: S$GLB,,, | Performed by: INTERNAL MEDICINE

## 2021-01-26 PROCEDURE — 3008F BODY MASS INDEX DOCD: CPT | Mod: CPTII,S$GLB,, | Performed by: INTERNAL MEDICINE

## 2021-01-26 PROCEDURE — 99214 PR OFFICE/OUTPT VISIT, EST, LEVL IV, 30-39 MIN: ICD-10-PCS | Mod: 25,S$GLB,, | Performed by: INTERNAL MEDICINE

## 2021-01-26 PROCEDURE — 90714 TD VACCINE GREATER THAN OR EQUAL TO 7YO PRESERVATIVE FREE IM: ICD-10-PCS | Mod: S$GLB,,, | Performed by: INTERNAL MEDICINE

## 2021-01-26 PROCEDURE — 3079F DIAST BP 80-89 MM HG: CPT | Mod: CPTII,S$GLB,, | Performed by: INTERNAL MEDICINE

## 2021-01-26 PROCEDURE — 99999 PR PBB SHADOW E&M-EST. PATIENT-LVL IV: CPT | Mod: PBBFAC,,, | Performed by: INTERNAL MEDICINE

## 2021-01-26 PROCEDURE — 3008F PR BODY MASS INDEX (BMI) DOCUMENTED: ICD-10-PCS | Mod: CPTII,S$GLB,, | Performed by: INTERNAL MEDICINE

## 2021-01-26 PROCEDURE — 3075F SYST BP GE 130 - 139MM HG: CPT | Mod: CPTII,S$GLB,, | Performed by: INTERNAL MEDICINE

## 2021-01-26 PROCEDURE — 90471 TD VACCINE GREATER THAN OR EQUAL TO 7YO PRESERVATIVE FREE IM: ICD-10-PCS | Mod: S$GLB,,, | Performed by: INTERNAL MEDICINE

## 2021-01-26 RX ORDER — ALPRAZOLAM 0.5 MG/1
0.5 TABLET ORAL NIGHTLY PRN
Qty: 30 TABLET | Refills: 2 | Status: SHIPPED | OUTPATIENT
Start: 2021-01-26 | End: 2021-08-02 | Stop reason: SDUPTHER

## 2021-01-26 RX ORDER — OMEPRAZOLE 40 MG/1
40 CAPSULE, DELAYED RELEASE ORAL DAILY
Qty: 90 CAPSULE | Refills: 2 | Status: SHIPPED | OUTPATIENT
Start: 2021-01-26 | End: 2021-08-02 | Stop reason: SDUPTHER

## 2021-01-26 RX ORDER — CARVEDILOL 12.5 MG/1
12.5 TABLET ORAL 2 TIMES DAILY
Qty: 180 TABLET | Refills: 3 | Status: SHIPPED | OUTPATIENT
Start: 2021-01-26 | End: 2021-08-02 | Stop reason: SDUPTHER

## 2021-01-26 RX ORDER — CITALOPRAM 20 MG/1
20 TABLET, FILM COATED ORAL DAILY
Qty: 90 TABLET | Refills: 3 | Status: SHIPPED | OUTPATIENT
Start: 2021-01-26 | End: 2021-08-02 | Stop reason: SDUPTHER

## 2021-01-26 RX ORDER — ALBUTEROL SULFATE 90 UG/1
2 AEROSOL, METERED RESPIRATORY (INHALATION) EVERY 6 HOURS PRN
Qty: 18 G | Refills: 1 | Status: SHIPPED | OUTPATIENT
Start: 2021-01-26 | End: 2021-08-02 | Stop reason: SDUPTHER

## 2021-02-01 RX ORDER — METRONIDAZOLE 500 MG/1
500 TABLET ORAL EVERY 8 HOURS
Qty: 30 TABLET | Refills: 0 | Status: SHIPPED | OUTPATIENT
Start: 2021-02-01 | End: 2021-03-09

## 2021-02-03 ENCOUNTER — PATIENT MESSAGE (OUTPATIENT)
Dept: PRIMARY CARE CLINIC | Facility: CLINIC | Age: 66
End: 2021-02-03

## 2021-02-03 ENCOUNTER — TELEPHONE (OUTPATIENT)
Dept: PRIMARY CARE CLINIC | Facility: CLINIC | Age: 66
End: 2021-02-03

## 2021-02-03 DIAGNOSIS — K57.32 DIVERTICULITIS OF SIGMOID COLON: Primary | ICD-10-CM

## 2021-02-14 ENCOUNTER — PATIENT MESSAGE (OUTPATIENT)
Dept: CARDIOLOGY | Facility: CLINIC | Age: 66
End: 2021-02-14

## 2021-03-09 ENCOUNTER — CLINICAL SUPPORT (OUTPATIENT)
Dept: CARDIOLOGY | Facility: CLINIC | Age: 66
End: 2021-03-09
Payer: MEDICARE

## 2021-03-09 ENCOUNTER — OFFICE VISIT (OUTPATIENT)
Dept: PRIMARY CARE CLINIC | Facility: CLINIC | Age: 66
End: 2021-03-09
Payer: MEDICARE

## 2021-03-09 ENCOUNTER — TELEPHONE (OUTPATIENT)
Dept: PRIMARY CARE CLINIC | Facility: CLINIC | Age: 66
End: 2021-03-09

## 2021-03-09 DIAGNOSIS — J03.90 TONSILLITIS: Primary | ICD-10-CM

## 2021-03-09 DIAGNOSIS — Z95.810 AICD (AUTOMATIC CARDIOVERTER/DEFIBRILLATOR) PRESENT: Primary | ICD-10-CM

## 2021-03-09 PROCEDURE — 93296 REM INTERROG EVL PM/IDS: CPT | Mod: S$GLB,,, | Performed by: INTERNAL MEDICINE

## 2021-03-09 PROCEDURE — 99213 OFFICE O/P EST LOW 20 MIN: CPT | Mod: 95,,, | Performed by: INTERNAL MEDICINE

## 2021-03-09 PROCEDURE — 93295 PR INTERROG EVAL, REMOTE, UP TO 90 DAYS,CARDVERT/DEFIB: ICD-10-PCS | Mod: S$GLB,,, | Performed by: INTERNAL MEDICINE

## 2021-03-09 PROCEDURE — 93296 PR INTERROG EVAL, REMOTE, UP TO 90 DAYS, PACER/DEFIB,TECH REVIEW: ICD-10-PCS | Mod: S$GLB,,, | Performed by: INTERNAL MEDICINE

## 2021-03-09 PROCEDURE — 93295 DEV INTERROG REMOTE 1/2/MLT: CPT | Mod: S$GLB,,, | Performed by: INTERNAL MEDICINE

## 2021-03-09 PROCEDURE — 99213 PR OFFICE/OUTPT VISIT, EST, LEVL III, 20-29 MIN: ICD-10-PCS | Mod: 95,,, | Performed by: INTERNAL MEDICINE

## 2021-03-09 RX ORDER — AMOXICILLIN 500 MG/1
TABLET, FILM COATED ORAL
Qty: 40 TABLET | Refills: 1 | Status: SHIPPED | OUTPATIENT
Start: 2021-03-09 | End: 2021-04-19 | Stop reason: ALTCHOICE

## 2021-03-13 ENCOUNTER — PATIENT MESSAGE (OUTPATIENT)
Dept: PRIMARY CARE CLINIC | Facility: CLINIC | Age: 66
End: 2021-03-13

## 2021-03-16 ENCOUNTER — PATIENT MESSAGE (OUTPATIENT)
Dept: CARDIOLOGY | Facility: CLINIC | Age: 66
End: 2021-03-16

## 2021-04-14 ENCOUNTER — PATIENT OUTREACH (OUTPATIENT)
Dept: ADMINISTRATIVE | Facility: OTHER | Age: 66
End: 2021-04-14

## 2021-04-19 ENCOUNTER — OFFICE VISIT (OUTPATIENT)
Dept: CARDIOLOGY | Facility: CLINIC | Age: 66
End: 2021-04-19
Payer: MEDICARE

## 2021-04-19 VITALS
SYSTOLIC BLOOD PRESSURE: 138 MMHG | HEIGHT: 63 IN | BODY MASS INDEX: 23.81 KG/M2 | HEART RATE: 62 BPM | DIASTOLIC BLOOD PRESSURE: 62 MMHG | OXYGEN SATURATION: 99 % | WEIGHT: 134.38 LBS

## 2021-04-19 DIAGNOSIS — E87.1 HYPONATREMIA: ICD-10-CM

## 2021-04-19 DIAGNOSIS — I25.2 OLD MI (MYOCARDIAL INFARCTION): ICD-10-CM

## 2021-04-19 DIAGNOSIS — I25.110 CORONARY ARTERY DISEASE INVOLVING NATIVE CORONARY ARTERY OF NATIVE HEART WITH UNSTABLE ANGINA PECTORIS: Primary | ICD-10-CM

## 2021-04-19 DIAGNOSIS — Z98.61 S/P PTCA (PERCUTANEOUS TRANSLUMINAL CORONARY ANGIOPLASTY): ICD-10-CM

## 2021-04-19 DIAGNOSIS — R07.9 CHEST PAIN, UNSPECIFIED TYPE: ICD-10-CM

## 2021-04-19 DIAGNOSIS — I25.5 ISCHEMIC CARDIOMYOPATHY: ICD-10-CM

## 2021-04-19 DIAGNOSIS — E78.01 FAMILIAL HYPERCHOLESTEROLEMIA: ICD-10-CM

## 2021-04-19 DIAGNOSIS — Z95.810 AICD (AUTOMATIC CARDIOVERTER/DEFIBRILLATOR) PRESENT: ICD-10-CM

## 2021-04-19 DIAGNOSIS — I50.42 CHRONIC COMBINED SYSTOLIC AND DIASTOLIC CONGESTIVE HEART FAILURE: ICD-10-CM

## 2021-04-19 DIAGNOSIS — K57.92 DIVERTICULITIS: ICD-10-CM

## 2021-04-19 DIAGNOSIS — I45.2 RBBB WITH LEFT ANTERIOR FASCICULAR BLOCK: ICD-10-CM

## 2021-04-19 PROCEDURE — 99499 RISK ADDL DX/OHS AUDIT: ICD-10-PCS | Mod: S$GLB,,, | Performed by: INTERNAL MEDICINE

## 2021-04-19 PROCEDURE — 93000 ELECTROCARDIOGRAM COMPLETE: CPT | Mod: S$GLB,,, | Performed by: INTERNAL MEDICINE

## 2021-04-19 PROCEDURE — 3008F PR BODY MASS INDEX (BMI) DOCUMENTED: ICD-10-PCS | Mod: CPTII,S$GLB,, | Performed by: INTERNAL MEDICINE

## 2021-04-19 PROCEDURE — 99214 PR OFFICE/OUTPT VISIT, EST, LEVL IV, 30-39 MIN: ICD-10-PCS | Mod: 25,S$GLB,, | Performed by: INTERNAL MEDICINE

## 2021-04-19 PROCEDURE — 99499 UNLISTED E&M SERVICE: CPT | Mod: S$GLB,,, | Performed by: INTERNAL MEDICINE

## 2021-04-19 PROCEDURE — 3008F BODY MASS INDEX DOCD: CPT | Mod: CPTII,S$GLB,, | Performed by: INTERNAL MEDICINE

## 2021-04-19 PROCEDURE — 99999 PR PBB SHADOW E&M-EST. PATIENT-LVL IV: CPT | Mod: PBBFAC,,, | Performed by: INTERNAL MEDICINE

## 2021-04-19 PROCEDURE — 1126F PR PAIN SEVERITY QUANTIFIED, NO PAIN PRESENT: ICD-10-PCS | Mod: S$GLB,,, | Performed by: INTERNAL MEDICINE

## 2021-04-19 PROCEDURE — 99999 PR PBB SHADOW E&M-EST. PATIENT-LVL IV: ICD-10-PCS | Mod: PBBFAC,,, | Performed by: INTERNAL MEDICINE

## 2021-04-19 PROCEDURE — 93000 EKG 12-LEAD: ICD-10-PCS | Mod: S$GLB,,, | Performed by: INTERNAL MEDICINE

## 2021-04-19 PROCEDURE — 1126F AMNT PAIN NOTED NONE PRSNT: CPT | Mod: S$GLB,,, | Performed by: INTERNAL MEDICINE

## 2021-04-19 PROCEDURE — 99214 OFFICE O/P EST MOD 30 MIN: CPT | Mod: 25,S$GLB,, | Performed by: INTERNAL MEDICINE

## 2021-04-19 RX ORDER — EZETIMIBE 10 MG/1
10 TABLET ORAL DAILY
Qty: 90 TABLET | Refills: 3 | Status: SHIPPED | OUTPATIENT
Start: 2021-04-19 | End: 2021-08-02 | Stop reason: SDUPTHER

## 2021-04-21 ENCOUNTER — TELEPHONE (OUTPATIENT)
Dept: CARDIOLOGY | Facility: CLINIC | Age: 66
End: 2021-04-21

## 2021-04-26 ENCOUNTER — PATIENT MESSAGE (OUTPATIENT)
Dept: RESEARCH | Facility: HOSPITAL | Age: 66
End: 2021-04-26

## 2021-06-07 ENCOUNTER — PATIENT OUTREACH (OUTPATIENT)
Dept: ADMINISTRATIVE | Facility: OTHER | Age: 66
End: 2021-06-07

## 2021-06-11 ENCOUNTER — CLINICAL SUPPORT (OUTPATIENT)
Dept: CARDIOLOGY | Facility: CLINIC | Age: 66
End: 2021-06-11
Payer: MEDICARE

## 2021-06-11 DIAGNOSIS — Z95.810 AICD (AUTOMATIC CARDIOVERTER/DEFIBRILLATOR) PRESENT: Primary | ICD-10-CM

## 2021-06-11 PROCEDURE — 93296 PR INTERROG EVAL, REMOTE, UP TO 90 DAYS, PACER/DEFIB,TECH REVIEW: ICD-10-PCS | Mod: S$GLB,,, | Performed by: INTERNAL MEDICINE

## 2021-06-11 PROCEDURE — 93296 REM INTERROG EVL PM/IDS: CPT | Mod: S$GLB,,, | Performed by: INTERNAL MEDICINE

## 2021-06-28 ENCOUNTER — PATIENT MESSAGE (OUTPATIENT)
Dept: CARDIOLOGY | Facility: CLINIC | Age: 66
End: 2021-06-28

## 2021-07-05 ENCOUNTER — PATIENT MESSAGE (OUTPATIENT)
Dept: PRIMARY CARE CLINIC | Facility: CLINIC | Age: 66
End: 2021-07-05

## 2021-07-05 DIAGNOSIS — K21.9 GASTROESOPHAGEAL REFLUX DISEASE, UNSPECIFIED WHETHER ESOPHAGITIS PRESENT: Primary | ICD-10-CM

## 2021-07-06 RX ORDER — SUCRALFATE 1 G/1
TABLET ORAL
Qty: 90 TABLET | Refills: 3 | Status: SHIPPED | OUTPATIENT
Start: 2021-07-06 | End: 2021-08-02 | Stop reason: SDUPTHER

## 2021-07-07 RX ORDER — SUCRALFATE 1 G/1
1 TABLET ORAL
Qty: 90 TABLET | Refills: 1 | OUTPATIENT
Start: 2021-07-07

## 2021-07-28 ENCOUNTER — PATIENT MESSAGE (OUTPATIENT)
Dept: CARDIOLOGY | Facility: CLINIC | Age: 66
End: 2021-07-28

## 2021-07-28 ENCOUNTER — PATIENT MESSAGE (OUTPATIENT)
Dept: PRIMARY CARE CLINIC | Facility: CLINIC | Age: 66
End: 2021-07-28

## 2021-08-02 ENCOUNTER — TELEPHONE (OUTPATIENT)
Dept: PRIMARY CARE CLINIC | Facility: CLINIC | Age: 66
End: 2021-08-02

## 2021-08-02 ENCOUNTER — OFFICE VISIT (OUTPATIENT)
Dept: PRIMARY CARE CLINIC | Facility: CLINIC | Age: 66
End: 2021-08-02
Payer: MEDICARE

## 2021-08-02 VITALS
TEMPERATURE: 98 F | DIASTOLIC BLOOD PRESSURE: 72 MMHG | HEART RATE: 70 BPM | RESPIRATION RATE: 18 BRPM | HEIGHT: 63 IN | WEIGHT: 138 LBS | BODY MASS INDEX: 24.45 KG/M2 | OXYGEN SATURATION: 99 % | SYSTOLIC BLOOD PRESSURE: 114 MMHG

## 2021-08-02 DIAGNOSIS — Z98.61 S/P PTCA (PERCUTANEOUS TRANSLUMINAL CORONARY ANGIOPLASTY): ICD-10-CM

## 2021-08-02 DIAGNOSIS — R76.8 ELEVATED RHEUMATOID FACTOR: ICD-10-CM

## 2021-08-02 DIAGNOSIS — Z95.810 AICD (AUTOMATIC CARDIOVERTER/DEFIBRILLATOR) PRESENT: ICD-10-CM

## 2021-08-02 DIAGNOSIS — E66.3 OVERWEIGHT (BMI 25.0-29.9): ICD-10-CM

## 2021-08-02 DIAGNOSIS — I50.30 CONGESTIVE HEART FAILURE WITH LEFT VENTRICULAR DIASTOLIC DYSFUNCTION, NYHA CLASS 2: ICD-10-CM

## 2021-08-02 DIAGNOSIS — I50.42 CHRONIC COMBINED SYSTOLIC AND DIASTOLIC CONGESTIVE HEART FAILURE: ICD-10-CM

## 2021-08-02 DIAGNOSIS — I45.2 RBBB WITH LEFT ANTERIOR FASCICULAR BLOCK: ICD-10-CM

## 2021-08-02 DIAGNOSIS — K21.9 GASTROESOPHAGEAL REFLUX DISEASE, UNSPECIFIED WHETHER ESOPHAGITIS PRESENT: ICD-10-CM

## 2021-08-02 DIAGNOSIS — K57.80 DIVERTICULITIS OF INTESTINE WITH ABSCESS WITHOUT BLEEDING, UNSPECIFIED PART OF INTESTINAL TRACT: ICD-10-CM

## 2021-08-02 DIAGNOSIS — E78.01 FAMILIAL HYPERCHOLESTEROLEMIA: ICD-10-CM

## 2021-08-02 DIAGNOSIS — F41.9 ANXIETY: ICD-10-CM

## 2021-08-02 DIAGNOSIS — J40 BRONCHITIS: ICD-10-CM

## 2021-08-02 DIAGNOSIS — I25.5 ISCHEMIC CARDIOMYOPATHY: Primary | ICD-10-CM

## 2021-08-02 DIAGNOSIS — I25.2 OLD MI (MYOCARDIAL INFARCTION): ICD-10-CM

## 2021-08-02 DIAGNOSIS — J32.9 SINUSITIS, UNSPECIFIED CHRONICITY, UNSPECIFIED LOCATION: ICD-10-CM

## 2021-08-02 DIAGNOSIS — J02.9 PHARYNGITIS, UNSPECIFIED ETIOLOGY: ICD-10-CM

## 2021-08-02 PROCEDURE — 3074F SYST BP LT 130 MM HG: CPT | Mod: CPTII,S$GLB,, | Performed by: FAMILY MEDICINE

## 2021-08-02 PROCEDURE — 96372 THER/PROPH/DIAG INJ SC/IM: CPT | Mod: S$GLB,,, | Performed by: FAMILY MEDICINE

## 2021-08-02 PROCEDURE — 99214 PR OFFICE/OUTPT VISIT, EST, LEVL IV, 30-39 MIN: ICD-10-PCS | Mod: 25,S$GLB,, | Performed by: FAMILY MEDICINE

## 2021-08-02 PROCEDURE — 3008F PR BODY MASS INDEX (BMI) DOCUMENTED: ICD-10-PCS | Mod: CPTII,S$GLB,, | Performed by: FAMILY MEDICINE

## 2021-08-02 PROCEDURE — 1126F AMNT PAIN NOTED NONE PRSNT: CPT | Mod: CPTII,S$GLB,, | Performed by: FAMILY MEDICINE

## 2021-08-02 PROCEDURE — 1159F MED LIST DOCD IN RCRD: CPT | Mod: CPTII,S$GLB,, | Performed by: FAMILY MEDICINE

## 2021-08-02 PROCEDURE — 3074F PR MOST RECENT SYSTOLIC BLOOD PRESSURE < 130 MM HG: ICD-10-PCS | Mod: CPTII,S$GLB,, | Performed by: FAMILY MEDICINE

## 2021-08-02 PROCEDURE — 99999 PR PBB SHADOW E&M-EST. PATIENT-LVL III: CPT | Mod: PBBFAC,,, | Performed by: FAMILY MEDICINE

## 2021-08-02 PROCEDURE — 1159F PR MEDICATION LIST DOCUMENTED IN MEDICAL RECORD: ICD-10-PCS | Mod: CPTII,S$GLB,, | Performed by: FAMILY MEDICINE

## 2021-08-02 PROCEDURE — 96372 PR INJECTION,THERAP/PROPH/DIAG2ST, IM OR SUBCUT: ICD-10-PCS | Mod: S$GLB,,, | Performed by: FAMILY MEDICINE

## 2021-08-02 PROCEDURE — 99214 OFFICE O/P EST MOD 30 MIN: CPT | Mod: 25,S$GLB,, | Performed by: FAMILY MEDICINE

## 2021-08-02 PROCEDURE — 3078F DIAST BP <80 MM HG: CPT | Mod: CPTII,S$GLB,, | Performed by: FAMILY MEDICINE

## 2021-08-02 PROCEDURE — 1126F PR PAIN SEVERITY QUANTIFIED, NO PAIN PRESENT: ICD-10-PCS | Mod: CPTII,S$GLB,, | Performed by: FAMILY MEDICINE

## 2021-08-02 PROCEDURE — 3078F PR MOST RECENT DIASTOLIC BLOOD PRESSURE < 80 MM HG: ICD-10-PCS | Mod: CPTII,S$GLB,, | Performed by: FAMILY MEDICINE

## 2021-08-02 PROCEDURE — 99999 PR PBB SHADOW E&M-EST. PATIENT-LVL III: ICD-10-PCS | Mod: PBBFAC,,, | Performed by: FAMILY MEDICINE

## 2021-08-02 PROCEDURE — 3008F BODY MASS INDEX DOCD: CPT | Mod: CPTII,S$GLB,, | Performed by: FAMILY MEDICINE

## 2021-08-02 RX ORDER — CARVEDILOL 12.5 MG/1
12.5 TABLET ORAL 2 TIMES DAILY
Qty: 180 TABLET | Refills: 3 | Status: ON HOLD | OUTPATIENT
Start: 2021-08-02 | End: 2022-01-26 | Stop reason: HOSPADM

## 2021-08-02 RX ORDER — TRIAMCINOLONE ACETONIDE 40 MG/ML
40 INJECTION, SUSPENSION INTRA-ARTICULAR; INTRAMUSCULAR ONCE
Status: COMPLETED | OUTPATIENT
Start: 2021-08-02 | End: 2021-08-02

## 2021-08-02 RX ORDER — ALBUTEROL SULFATE 90 UG/1
2 AEROSOL, METERED RESPIRATORY (INHALATION) EVERY 6 HOURS PRN
Qty: 18 G | Refills: 1 | Status: SHIPPED | OUTPATIENT
Start: 2021-08-02 | End: 2022-01-01 | Stop reason: SDUPTHER

## 2021-08-02 RX ORDER — EZETIMIBE 10 MG/1
10 TABLET ORAL DAILY
Qty: 90 TABLET | Refills: 3 | Status: ON HOLD | OUTPATIENT
Start: 2021-08-02 | End: 2022-02-28 | Stop reason: HOSPADM

## 2021-08-02 RX ORDER — SUCRALFATE 1 G/1
TABLET ORAL
Qty: 90 TABLET | Refills: 3 | Status: SHIPPED | OUTPATIENT
Start: 2021-08-02 | End: 2021-10-25

## 2021-08-02 RX ORDER — ALPRAZOLAM 0.5 MG/1
0.5 TABLET ORAL NIGHTLY PRN
Qty: 30 TABLET | Refills: 2 | Status: SHIPPED | OUTPATIENT
Start: 2021-08-02 | End: 2021-11-01 | Stop reason: SDUPTHER

## 2021-08-02 RX ORDER — FLUTICASONE PROPIONATE 50 MCG
1 SPRAY, SUSPENSION (ML) NASAL DAILY
Qty: 9.9 ML | Refills: 1 | Status: SHIPPED | OUTPATIENT
Start: 2021-08-02

## 2021-08-02 RX ORDER — CEFDINIR 300 MG/1
CAPSULE ORAL
Qty: 20 CAPSULE | Refills: 0 | Status: SHIPPED | OUTPATIENT
Start: 2021-08-02 | End: 2021-10-25

## 2021-08-02 RX ORDER — PREDNISONE 5 MG/1
TABLET ORAL
Qty: 20 TABLET | Refills: 0 | Status: SHIPPED | OUTPATIENT
Start: 2021-08-02 | End: 2021-10-25

## 2021-08-02 RX ORDER — OMEPRAZOLE 40 MG/1
40 CAPSULE, DELAYED RELEASE ORAL DAILY
Qty: 90 CAPSULE | Refills: 2 | Status: ON HOLD | OUTPATIENT
Start: 2021-08-02 | End: 2022-01-01

## 2021-08-02 RX ORDER — PROMETHAZINE HYDROCHLORIDE AND CODEINE PHOSPHATE 6.25; 1 MG/5ML; MG/5ML
5 SOLUTION ORAL EVERY 6 HOURS PRN
Qty: 180 ML | Refills: 0 | Status: SHIPPED | OUTPATIENT
Start: 2021-08-02 | End: 2021-12-16

## 2021-08-02 RX ORDER — CITALOPRAM 20 MG/1
20 TABLET, FILM COATED ORAL DAILY
Qty: 90 TABLET | Refills: 3 | Status: ON HOLD | OUTPATIENT
Start: 2021-08-02 | End: 2022-01-26 | Stop reason: HOSPADM

## 2021-08-02 RX ADMIN — TRIAMCINOLONE ACETONIDE 40 MG: 40 INJECTION, SUSPENSION INTRA-ARTICULAR; INTRAMUSCULAR at 10:08

## 2021-08-03 ENCOUNTER — PATIENT MESSAGE (OUTPATIENT)
Dept: PRIMARY CARE CLINIC | Facility: CLINIC | Age: 66
End: 2021-08-03

## 2021-08-10 ENCOUNTER — PATIENT MESSAGE (OUTPATIENT)
Dept: PRIMARY CARE CLINIC | Facility: CLINIC | Age: 66
End: 2021-08-10

## 2021-08-13 NOTE — TELEPHONE ENCOUNTER
She wants a rx for amoxicillin          Put in my record I can take amoxicillin and penicillin not allergic it is something else that's in augmentin that I'm allergic to not amoxicillin thanks         No

## 2021-09-08 ENCOUNTER — PATIENT MESSAGE (OUTPATIENT)
Dept: PRIMARY CARE CLINIC | Facility: CLINIC | Age: 66
End: 2021-09-08

## 2021-09-08 RX ORDER — BETAMETHASONE VALERATE 1.2 MG/G
CREAM TOPICAL 2 TIMES DAILY
Qty: 45 G | Refills: 0 | Status: SHIPPED | OUTPATIENT
Start: 2021-09-08 | End: 2021-12-16

## 2021-09-08 RX ORDER — BETAMETHASONE DIPROPIONATE 0.5 MG/G
CREAM TOPICAL 2 TIMES DAILY
Qty: 45 G | Refills: 1 | Status: CANCELLED | OUTPATIENT
Start: 2021-09-08

## 2021-09-09 ENCOUNTER — PATIENT MESSAGE (OUTPATIENT)
Dept: PRIMARY CARE CLINIC | Facility: CLINIC | Age: 66
End: 2021-09-09

## 2021-09-13 ENCOUNTER — CLINICAL SUPPORT (OUTPATIENT)
Dept: CARDIOLOGY | Facility: CLINIC | Age: 66
End: 2021-09-13
Payer: MEDICARE

## 2021-09-13 DIAGNOSIS — Z95.810 AICD (AUTOMATIC CARDIOVERTER/DEFIBRILLATOR) PRESENT: Primary | ICD-10-CM

## 2021-09-13 PROCEDURE — 93295 PR INTERROG EVAL, REMOTE, UP TO 90 DAYS,CARDVERT/DEFIB: ICD-10-PCS | Mod: S$GLB,,, | Performed by: INTERNAL MEDICINE

## 2021-09-13 PROCEDURE — 93296 PR INTERROG EVAL, REMOTE, UP TO 90 DAYS, PACER/DEFIB,TECH REVIEW: ICD-10-PCS | Mod: S$GLB,,, | Performed by: INTERNAL MEDICINE

## 2021-09-13 PROCEDURE — 93295 DEV INTERROG REMOTE 1/2/MLT: CPT | Mod: S$GLB,,, | Performed by: INTERNAL MEDICINE

## 2021-09-13 PROCEDURE — 99499 UNLISTED E&M SERVICE: CPT | Mod: S$GLB,,, | Performed by: INTERNAL MEDICINE

## 2021-09-13 PROCEDURE — 99499 RISK ADDL DX/OHS AUDIT: ICD-10-PCS | Mod: S$GLB,,, | Performed by: INTERNAL MEDICINE

## 2021-09-13 PROCEDURE — 93296 REM INTERROG EVL PM/IDS: CPT | Mod: S$GLB,,, | Performed by: INTERNAL MEDICINE

## 2021-09-20 ENCOUNTER — PATIENT MESSAGE (OUTPATIENT)
Dept: PRIMARY CARE CLINIC | Facility: CLINIC | Age: 66
End: 2021-09-20

## 2021-09-24 ENCOUNTER — TELEPHONE (OUTPATIENT)
Dept: PRIMARY CARE CLINIC | Facility: CLINIC | Age: 66
End: 2021-09-24

## 2021-09-24 ENCOUNTER — OFFICE VISIT (OUTPATIENT)
Dept: PRIMARY CARE CLINIC | Facility: CLINIC | Age: 66
End: 2021-09-24
Payer: MEDICARE

## 2021-09-24 DIAGNOSIS — R76.8 ELEVATED RHEUMATOID FACTOR: ICD-10-CM

## 2021-09-24 DIAGNOSIS — N76.0 ACUTE VAGINITIS: Primary | ICD-10-CM

## 2021-09-24 DIAGNOSIS — R76.8 POSITIVE ANA (ANTINUCLEAR ANTIBODY): ICD-10-CM

## 2021-09-24 PROCEDURE — 99442 PR PHYSICIAN TELEPHONE EVALUATION 11-20 MIN: CPT | Mod: 95,,, | Performed by: INTERNAL MEDICINE

## 2021-09-24 PROCEDURE — 1160F PR REVIEW ALL MEDS BY PRESCRIBER/CLIN PHARMACIST DOCUMENTED: ICD-10-PCS | Mod: CPTII,95,, | Performed by: INTERNAL MEDICINE

## 2021-09-24 PROCEDURE — 99442 PR PHYSICIAN TELEPHONE EVALUATION 11-20 MIN: ICD-10-PCS | Mod: 95,,, | Performed by: INTERNAL MEDICINE

## 2021-09-24 PROCEDURE — 1159F MED LIST DOCD IN RCRD: CPT | Mod: CPTII,95,, | Performed by: INTERNAL MEDICINE

## 2021-09-24 PROCEDURE — 1159F PR MEDICATION LIST DOCUMENTED IN MEDICAL RECORD: ICD-10-PCS | Mod: CPTII,95,, | Performed by: INTERNAL MEDICINE

## 2021-09-24 PROCEDURE — 1160F RVW MEDS BY RX/DR IN RCRD: CPT | Mod: CPTII,95,, | Performed by: INTERNAL MEDICINE

## 2021-09-24 RX ORDER — METRONIDAZOLE 500 MG/1
500 TABLET ORAL EVERY 12 HOURS
Qty: 14 TABLET | Refills: 0 | Status: SHIPPED | OUTPATIENT
Start: 2021-09-24 | End: 2021-10-25

## 2021-09-24 RX ORDER — METRONIDAZOLE 7.5 MG/G
1 GEL VAGINAL 2 TIMES DAILY
Qty: 70 G | Refills: 1 | Status: SHIPPED | OUTPATIENT
Start: 2021-09-24 | End: 2021-10-25

## 2021-09-27 ENCOUNTER — TELEPHONE (OUTPATIENT)
Dept: RHEUMATOLOGY | Facility: CLINIC | Age: 66
End: 2021-09-27

## 2021-09-29 ENCOUNTER — PATIENT MESSAGE (OUTPATIENT)
Dept: PRIMARY CARE CLINIC | Facility: CLINIC | Age: 66
End: 2021-09-29

## 2021-10-11 ENCOUNTER — PATIENT OUTREACH (OUTPATIENT)
Dept: ADMINISTRATIVE | Facility: OTHER | Age: 66
End: 2021-10-11

## 2021-10-19 ENCOUNTER — PATIENT MESSAGE (OUTPATIENT)
Dept: CARDIOLOGY | Facility: CLINIC | Age: 66
End: 2021-10-19
Payer: MEDICARE

## 2021-10-25 ENCOUNTER — OFFICE VISIT (OUTPATIENT)
Dept: PRIMARY CARE CLINIC | Facility: CLINIC | Age: 66
End: 2021-10-25
Payer: MEDICARE

## 2021-10-25 VITALS
WEIGHT: 130.06 LBS | TEMPERATURE: 98 F | RESPIRATION RATE: 16 BRPM | OXYGEN SATURATION: 100 % | DIASTOLIC BLOOD PRESSURE: 64 MMHG | HEART RATE: 71 BPM | HEIGHT: 63 IN | SYSTOLIC BLOOD PRESSURE: 110 MMHG | BODY MASS INDEX: 23.04 KG/M2

## 2021-10-25 DIAGNOSIS — R76.8 ELEVATED RHEUMATOID FACTOR: ICD-10-CM

## 2021-10-25 DIAGNOSIS — R10.30 LOWER ABDOMINAL PAIN: ICD-10-CM

## 2021-10-25 DIAGNOSIS — N30.00 ACUTE CYSTITIS WITHOUT HEMATURIA: Primary | ICD-10-CM

## 2021-10-25 PROCEDURE — 99214 OFFICE O/P EST MOD 30 MIN: CPT | Mod: S$GLB,,, | Performed by: INTERNAL MEDICINE

## 2021-10-25 PROCEDURE — 99214 PR OFFICE/OUTPT VISIT, EST, LEVL IV, 30-39 MIN: ICD-10-PCS | Mod: S$GLB,,, | Performed by: INTERNAL MEDICINE

## 2021-10-25 PROCEDURE — 3008F PR BODY MASS INDEX (BMI) DOCUMENTED: ICD-10-PCS | Mod: CPTII,S$GLB,, | Performed by: INTERNAL MEDICINE

## 2021-10-25 PROCEDURE — 3288F FALL RISK ASSESSMENT DOCD: CPT | Mod: CPTII,S$GLB,, | Performed by: INTERNAL MEDICINE

## 2021-10-25 PROCEDURE — 3008F BODY MASS INDEX DOCD: CPT | Mod: CPTII,S$GLB,, | Performed by: INTERNAL MEDICINE

## 2021-10-25 PROCEDURE — 1101F PT FALLS ASSESS-DOCD LE1/YR: CPT | Mod: CPTII,S$GLB,, | Performed by: INTERNAL MEDICINE

## 2021-10-25 PROCEDURE — 1160F RVW MEDS BY RX/DR IN RCRD: CPT | Mod: CPTII,S$GLB,, | Performed by: INTERNAL MEDICINE

## 2021-10-25 PROCEDURE — 1159F MED LIST DOCD IN RCRD: CPT | Mod: CPTII,S$GLB,, | Performed by: INTERNAL MEDICINE

## 2021-10-25 PROCEDURE — 99999 PR PBB SHADOW E&M-EST. PATIENT-LVL IV: ICD-10-PCS | Mod: PBBFAC,,, | Performed by: INTERNAL MEDICINE

## 2021-10-25 PROCEDURE — 3078F DIAST BP <80 MM HG: CPT | Mod: CPTII,S$GLB,, | Performed by: INTERNAL MEDICINE

## 2021-10-25 PROCEDURE — 3288F PR FALLS RISK ASSESSMENT DOCUMENTED: ICD-10-PCS | Mod: CPTII,S$GLB,, | Performed by: INTERNAL MEDICINE

## 2021-10-25 PROCEDURE — 1160F PR REVIEW ALL MEDS BY PRESCRIBER/CLIN PHARMACIST DOCUMENTED: ICD-10-PCS | Mod: CPTII,S$GLB,, | Performed by: INTERNAL MEDICINE

## 2021-10-25 PROCEDURE — 1159F PR MEDICATION LIST DOCUMENTED IN MEDICAL RECORD: ICD-10-PCS | Mod: CPTII,S$GLB,, | Performed by: INTERNAL MEDICINE

## 2021-10-25 PROCEDURE — 3074F SYST BP LT 130 MM HG: CPT | Mod: CPTII,S$GLB,, | Performed by: INTERNAL MEDICINE

## 2021-10-25 PROCEDURE — 3078F PR MOST RECENT DIASTOLIC BLOOD PRESSURE < 80 MM HG: ICD-10-PCS | Mod: CPTII,S$GLB,, | Performed by: INTERNAL MEDICINE

## 2021-10-25 PROCEDURE — 99999 PR PBB SHADOW E&M-EST. PATIENT-LVL IV: CPT | Mod: PBBFAC,,, | Performed by: INTERNAL MEDICINE

## 2021-10-25 PROCEDURE — 1101F PR PT FALLS ASSESS DOC 0-1 FALLS W/OUT INJ PAST YR: ICD-10-PCS | Mod: CPTII,S$GLB,, | Performed by: INTERNAL MEDICINE

## 2021-10-25 PROCEDURE — 3074F PR MOST RECENT SYSTOLIC BLOOD PRESSURE < 130 MM HG: ICD-10-PCS | Mod: CPTII,S$GLB,, | Performed by: INTERNAL MEDICINE

## 2021-10-25 RX ORDER — METRONIDAZOLE 500 MG/1
500 TABLET ORAL 3 TIMES DAILY
Qty: 30 TABLET | Refills: 0 | Status: SHIPPED | OUTPATIENT
Start: 2021-10-25 | End: 2021-12-16 | Stop reason: ALTCHOICE

## 2021-10-25 RX ORDER — PHENAZOPYRIDINE HYDROCHLORIDE 100 MG/1
100 TABLET, FILM COATED ORAL 3 TIMES DAILY PRN
Qty: 15 TABLET | Refills: 0 | Status: SHIPPED | OUTPATIENT
Start: 2021-10-25 | End: 2021-11-04

## 2021-10-25 RX ORDER — CIPROFLOXACIN 500 MG/1
500 TABLET ORAL 2 TIMES DAILY
Qty: 20 TABLET | Refills: 0 | Status: SHIPPED | OUTPATIENT
Start: 2021-10-25 | End: 2021-11-01

## 2021-11-01 ENCOUNTER — OFFICE VISIT (OUTPATIENT)
Dept: PRIMARY CARE CLINIC | Facility: CLINIC | Age: 66
End: 2021-11-01
Payer: MEDICARE

## 2021-11-01 VITALS
RESPIRATION RATE: 16 BRPM | OXYGEN SATURATION: 99 % | WEIGHT: 134.5 LBS | HEIGHT: 63 IN | HEART RATE: 71 BPM | BODY MASS INDEX: 23.83 KG/M2 | SYSTOLIC BLOOD PRESSURE: 118 MMHG | DIASTOLIC BLOOD PRESSURE: 62 MMHG

## 2021-11-01 DIAGNOSIS — R76.8 POSITIVE ANA (ANTINUCLEAR ANTIBODY): ICD-10-CM

## 2021-11-01 DIAGNOSIS — Z12.31 ENCOUNTER FOR SCREENING MAMMOGRAM FOR BREAST CANCER: Primary | ICD-10-CM

## 2021-11-01 DIAGNOSIS — N76.0 ACUTE VAGINITIS: ICD-10-CM

## 2021-11-01 DIAGNOSIS — F41.9 ANXIETY: ICD-10-CM

## 2021-11-01 DIAGNOSIS — N30.00 ACUTE CYSTITIS WITHOUT HEMATURIA: ICD-10-CM

## 2021-11-01 DIAGNOSIS — R76.8 ELEVATED RHEUMATOID FACTOR: ICD-10-CM

## 2021-11-01 LAB
BILIRUB SERPL-MCNC: ABNORMAL MG/DL
BLOOD URINE, POC: 50
CLARITY, POC UA: ABNORMAL
COLOR, POC UA: YELLOW
GLUCOSE UR QL STRIP: ABNORMAL
KETONES UR QL STRIP: ABNORMAL
LEUKOCYTE ESTERASE URINE, POC: ABNORMAL
NITRITE, POC UA: ABNORMAL
PH, POC UA: 5
PROTEIN, POC: ABNORMAL
SPECIFIC GRAVITY, POC UA: 1.02
UROBILINOGEN, POC UA: ABNORMAL

## 2021-11-01 PROCEDURE — 3078F DIAST BP <80 MM HG: CPT | Mod: CPTII,S$GLB,, | Performed by: INTERNAL MEDICINE

## 2021-11-01 PROCEDURE — 1101F PR PT FALLS ASSESS DOC 0-1 FALLS W/OUT INJ PAST YR: ICD-10-PCS | Mod: CPTII,S$GLB,, | Performed by: INTERNAL MEDICINE

## 2021-11-01 PROCEDURE — 3008F BODY MASS INDEX DOCD: CPT | Mod: CPTII,S$GLB,, | Performed by: INTERNAL MEDICINE

## 2021-11-01 PROCEDURE — 1160F RVW MEDS BY RX/DR IN RCRD: CPT | Mod: CPTII,S$GLB,, | Performed by: INTERNAL MEDICINE

## 2021-11-01 PROCEDURE — 99999 PR PBB SHADOW E&M-EST. PATIENT-LVL V: CPT | Mod: PBBFAC,,, | Performed by: INTERNAL MEDICINE

## 2021-11-01 PROCEDURE — 1159F PR MEDICATION LIST DOCUMENTED IN MEDICAL RECORD: ICD-10-PCS | Mod: CPTII,S$GLB,, | Performed by: INTERNAL MEDICINE

## 2021-11-01 PROCEDURE — 3288F FALL RISK ASSESSMENT DOCD: CPT | Mod: CPTII,S$GLB,, | Performed by: INTERNAL MEDICINE

## 2021-11-01 PROCEDURE — 3078F PR MOST RECENT DIASTOLIC BLOOD PRESSURE < 80 MM HG: ICD-10-PCS | Mod: CPTII,S$GLB,, | Performed by: INTERNAL MEDICINE

## 2021-11-01 PROCEDURE — 81002 POCT URINE DIPSTICK WITHOUT MICROSCOPE: ICD-10-PCS | Mod: S$GLB,,, | Performed by: INTERNAL MEDICINE

## 2021-11-01 PROCEDURE — 3074F PR MOST RECENT SYSTOLIC BLOOD PRESSURE < 130 MM HG: ICD-10-PCS | Mod: CPTII,S$GLB,, | Performed by: INTERNAL MEDICINE

## 2021-11-01 PROCEDURE — 1159F MED LIST DOCD IN RCRD: CPT | Mod: CPTII,S$GLB,, | Performed by: INTERNAL MEDICINE

## 2021-11-01 PROCEDURE — 99214 PR OFFICE/OUTPT VISIT, EST, LEVL IV, 30-39 MIN: ICD-10-PCS | Mod: S$GLB,,, | Performed by: INTERNAL MEDICINE

## 2021-11-01 PROCEDURE — 1160F PR REVIEW ALL MEDS BY PRESCRIBER/CLIN PHARMACIST DOCUMENTED: ICD-10-PCS | Mod: CPTII,S$GLB,, | Performed by: INTERNAL MEDICINE

## 2021-11-01 PROCEDURE — 99999 PR PBB SHADOW E&M-EST. PATIENT-LVL V: ICD-10-PCS | Mod: PBBFAC,,, | Performed by: INTERNAL MEDICINE

## 2021-11-01 PROCEDURE — 99214 OFFICE O/P EST MOD 30 MIN: CPT | Mod: S$GLB,,, | Performed by: INTERNAL MEDICINE

## 2021-11-01 PROCEDURE — 1101F PT FALLS ASSESS-DOCD LE1/YR: CPT | Mod: CPTII,S$GLB,, | Performed by: INTERNAL MEDICINE

## 2021-11-01 PROCEDURE — 81002 URINALYSIS NONAUTO W/O SCOPE: CPT | Mod: S$GLB,,, | Performed by: INTERNAL MEDICINE

## 2021-11-01 PROCEDURE — 3008F PR BODY MASS INDEX (BMI) DOCUMENTED: ICD-10-PCS | Mod: CPTII,S$GLB,, | Performed by: INTERNAL MEDICINE

## 2021-11-01 PROCEDURE — 3288F PR FALLS RISK ASSESSMENT DOCUMENTED: ICD-10-PCS | Mod: CPTII,S$GLB,, | Performed by: INTERNAL MEDICINE

## 2021-11-01 PROCEDURE — 3074F SYST BP LT 130 MM HG: CPT | Mod: CPTII,S$GLB,, | Performed by: INTERNAL MEDICINE

## 2021-11-01 RX ORDER — ALPRAZOLAM 0.5 MG/1
0.5 TABLET ORAL NIGHTLY PRN
Qty: 30 TABLET | Refills: 2 | Status: SHIPPED | OUTPATIENT
Start: 2021-11-01 | End: 2022-01-01 | Stop reason: SDUPTHER

## 2021-11-02 DIAGNOSIS — U07.1 RASH ASSOCIATED WITH COVID-19: Primary | ICD-10-CM

## 2021-11-02 DIAGNOSIS — R21 RASH ASSOCIATED WITH COVID-19: Primary | ICD-10-CM

## 2021-11-03 RX ORDER — PREDNISONE 20 MG/1
20 TABLET ORAL 2 TIMES DAILY
Qty: 10 TABLET | Refills: 0 | Status: SHIPPED | OUTPATIENT
Start: 2021-11-03 | End: 2021-12-16 | Stop reason: ALTCHOICE

## 2021-11-11 ENCOUNTER — PATIENT MESSAGE (OUTPATIENT)
Dept: CARDIOLOGY | Facility: CLINIC | Age: 66
End: 2021-11-11
Payer: MEDICARE

## 2021-11-11 ENCOUNTER — PATIENT OUTREACH (OUTPATIENT)
Dept: ADMINISTRATIVE | Facility: OTHER | Age: 66
End: 2021-11-11
Payer: MEDICARE

## 2021-12-13 ENCOUNTER — PATIENT OUTREACH (OUTPATIENT)
Dept: ADMINISTRATIVE | Facility: OTHER | Age: 66
End: 2021-12-13
Payer: MEDICARE

## 2021-12-13 ENCOUNTER — CLINICAL SUPPORT (OUTPATIENT)
Dept: CARDIOLOGY | Facility: CLINIC | Age: 66
End: 2021-12-13
Payer: MEDICARE

## 2021-12-13 DIAGNOSIS — Z95.810 AICD (AUTOMATIC CARDIOVERTER/DEFIBRILLATOR) PRESENT: Primary | ICD-10-CM

## 2021-12-13 PROCEDURE — 93296 PR INTERROG EVAL, REMOTE, UP TO 90 DAYS, PACER/DEFIB,TECH REVIEW: ICD-10-PCS | Mod: S$GLB,,, | Performed by: INTERNAL MEDICINE

## 2021-12-13 PROCEDURE — 93295 DEV INTERROG REMOTE 1/2/MLT: CPT | Mod: S$GLB,,, | Performed by: INTERNAL MEDICINE

## 2021-12-13 PROCEDURE — 93295 PR INTERROG EVAL, REMOTE, UP TO 90 DAYS,CARDVERT/DEFIB: ICD-10-PCS | Mod: S$GLB,,, | Performed by: INTERNAL MEDICINE

## 2021-12-13 PROCEDURE — 93296 REM INTERROG EVL PM/IDS: CPT | Mod: S$GLB,,, | Performed by: INTERNAL MEDICINE

## 2021-12-16 ENCOUNTER — OFFICE VISIT (OUTPATIENT)
Dept: CARDIOLOGY | Facility: CLINIC | Age: 66
End: 2021-12-16
Payer: MEDICARE

## 2021-12-16 ENCOUNTER — PATIENT MESSAGE (OUTPATIENT)
Dept: PRIMARY CARE CLINIC | Facility: CLINIC | Age: 66
End: 2021-12-16

## 2021-12-16 VITALS
WEIGHT: 130.5 LBS | OXYGEN SATURATION: 97 % | SYSTOLIC BLOOD PRESSURE: 132 MMHG | HEIGHT: 63 IN | BODY MASS INDEX: 23.12 KG/M2 | DIASTOLIC BLOOD PRESSURE: 63 MMHG | HEART RATE: 73 BPM

## 2021-12-16 DIAGNOSIS — E78.01 FAMILIAL HYPERCHOLESTEROLEMIA: ICD-10-CM

## 2021-12-16 DIAGNOSIS — E87.1 HYPONATREMIA: ICD-10-CM

## 2021-12-16 DIAGNOSIS — I50.22 CHRONIC SYSTOLIC CONGESTIVE HEART FAILURE: ICD-10-CM

## 2021-12-16 DIAGNOSIS — Z98.61 S/P PTCA (PERCUTANEOUS TRANSLUMINAL CORONARY ANGIOPLASTY): ICD-10-CM

## 2021-12-16 DIAGNOSIS — Z95.810 AICD (AUTOMATIC CARDIOVERTER/DEFIBRILLATOR) PRESENT: Primary | ICD-10-CM

## 2021-12-16 DIAGNOSIS — I25.2 OLD MI (MYOCARDIAL INFARCTION): ICD-10-CM

## 2021-12-16 DIAGNOSIS — I45.2 RBBB WITH LEFT ANTERIOR FASCICULAR BLOCK: ICD-10-CM

## 2021-12-16 DIAGNOSIS — I25.5 ISCHEMIC CARDIOMYOPATHY: ICD-10-CM

## 2021-12-16 DIAGNOSIS — I25.10 CORONARY ARTERY DISEASE INVOLVING NATIVE CORONARY ARTERY OF NATIVE HEART WITHOUT ANGINA PECTORIS: ICD-10-CM

## 2021-12-16 PROCEDURE — 99214 OFFICE O/P EST MOD 30 MIN: CPT | Mod: 25,S$GLB,, | Performed by: INTERNAL MEDICINE

## 2021-12-16 PROCEDURE — 99214 PR OFFICE/OUTPT VISIT, EST, LEVL IV, 30-39 MIN: ICD-10-PCS | Mod: 25,S$GLB,, | Performed by: INTERNAL MEDICINE

## 2021-12-16 PROCEDURE — 99999 PR PBB SHADOW E&M-EST. PATIENT-LVL III: ICD-10-PCS | Mod: PBBFAC,,, | Performed by: INTERNAL MEDICINE

## 2021-12-16 PROCEDURE — 93283 PR PROGRAM EVAL (IN PERSON) IMPLANT DEVICE,CARDVERT/DEFIB,2 LEAD: ICD-10-PCS | Mod: 26,,, | Performed by: INTERNAL MEDICINE

## 2021-12-16 PROCEDURE — 93000 EKG 12-LEAD: ICD-10-PCS | Mod: 59,S$GLB,, | Performed by: INTERNAL MEDICINE

## 2021-12-16 PROCEDURE — 93283 PRGRMG EVAL IMPLANTABLE DFB: CPT | Mod: 26,,, | Performed by: INTERNAL MEDICINE

## 2021-12-16 PROCEDURE — 99499 UNLISTED E&M SERVICE: CPT | Mod: S$GLB,,, | Performed by: INTERNAL MEDICINE

## 2021-12-16 PROCEDURE — 93000 ELECTROCARDIOGRAM COMPLETE: CPT | Mod: 59,S$GLB,, | Performed by: INTERNAL MEDICINE

## 2021-12-16 PROCEDURE — 99999 PR PBB SHADOW E&M-EST. PATIENT-LVL III: CPT | Mod: PBBFAC,,, | Performed by: INTERNAL MEDICINE

## 2021-12-16 PROCEDURE — 99499 RISK ADDL DX/OHS AUDIT: ICD-10-PCS | Mod: S$GLB,,, | Performed by: INTERNAL MEDICINE

## 2022-01-01 ENCOUNTER — DOCUMENTATION ONLY (OUTPATIENT)
Dept: CARDIAC CATH/INVASIVE PROCEDURES | Facility: HOSPITAL | Age: 67
End: 2022-01-01
Payer: MEDICARE

## 2022-01-01 ENCOUNTER — PATIENT OUTREACH (OUTPATIENT)
Dept: ADMINISTRATIVE | Facility: OTHER | Age: 67
End: 2022-01-01
Payer: MEDICARE

## 2022-01-01 ENCOUNTER — TELEPHONE (OUTPATIENT)
Dept: SURGERY | Facility: CLINIC | Age: 67
End: 2022-01-01
Payer: MEDICARE

## 2022-01-01 ENCOUNTER — OFFICE VISIT (OUTPATIENT)
Dept: WOUND CARE | Facility: CLINIC | Age: 67
End: 2022-01-01
Payer: MEDICARE

## 2022-01-01 ENCOUNTER — PATIENT MESSAGE (OUTPATIENT)
Dept: PRIMARY CARE CLINIC | Facility: CLINIC | Age: 67
End: 2022-01-01
Payer: MEDICARE

## 2022-01-01 ENCOUNTER — TELEPHONE (OUTPATIENT)
Dept: TRANSPLANT | Facility: CLINIC | Age: 67
End: 2022-01-01
Payer: MEDICARE

## 2022-01-01 ENCOUNTER — DOCUMENTATION ONLY (OUTPATIENT)
Dept: TRANSPLANT | Facility: CLINIC | Age: 67
End: 2022-01-01
Payer: MEDICARE

## 2022-01-01 ENCOUNTER — PATIENT MESSAGE (OUTPATIENT)
Dept: WOUND CARE | Facility: CLINIC | Age: 67
End: 2022-01-01
Payer: MEDICARE

## 2022-01-01 ENCOUNTER — DOCUMENT SCAN (OUTPATIENT)
Dept: HOME HEALTH SERVICES | Facility: HOSPITAL | Age: 67
End: 2022-01-01
Payer: MEDICARE

## 2022-01-01 ENCOUNTER — PATIENT MESSAGE (OUTPATIENT)
Dept: SURGERY | Facility: CLINIC | Age: 67
End: 2022-01-01
Payer: MEDICARE

## 2022-01-01 ENCOUNTER — OFFICE VISIT (OUTPATIENT)
Dept: PRIMARY CARE CLINIC | Facility: CLINIC | Age: 67
End: 2022-01-01
Payer: MEDICARE

## 2022-01-01 ENCOUNTER — PATIENT MESSAGE (OUTPATIENT)
Dept: CARDIOLOGY | Facility: CLINIC | Age: 67
End: 2022-01-01

## 2022-01-01 ENCOUNTER — CLINICAL SUPPORT (OUTPATIENT)
Dept: CARDIOLOGY | Facility: CLINIC | Age: 67
End: 2022-01-01
Payer: MEDICARE

## 2022-01-01 ENCOUNTER — EXTERNAL HOME HEALTH (OUTPATIENT)
Dept: HOME HEALTH SERVICES | Facility: HOSPITAL | Age: 67
End: 2022-01-01
Payer: MEDICARE

## 2022-01-01 ENCOUNTER — HOSPITAL ENCOUNTER (INPATIENT)
Facility: OTHER | Age: 67
LOS: 17 days | Discharge: HOME HEALTH | DRG: 291 | End: 2022-06-25
Attending: EMERGENCY MEDICINE | Admitting: INTERNAL MEDICINE
Payer: MEDICARE

## 2022-01-01 ENCOUNTER — PATIENT OUTREACH (OUTPATIENT)
Dept: ADMINISTRATIVE | Facility: CLINIC | Age: 67
End: 2022-01-01
Payer: MEDICARE

## 2022-01-01 ENCOUNTER — OFFICE VISIT (OUTPATIENT)
Dept: CARDIOLOGY | Facility: CLINIC | Age: 67
End: 2022-01-01
Attending: INTERNAL MEDICINE
Payer: MEDICARE

## 2022-01-01 ENCOUNTER — TELEPHONE (OUTPATIENT)
Dept: CARDIOLOGY | Facility: CLINIC | Age: 67
End: 2022-01-01
Payer: MEDICARE

## 2022-01-01 ENCOUNTER — PATIENT MESSAGE (OUTPATIENT)
Dept: CARDIOLOGY | Facility: CLINIC | Age: 67
End: 2022-01-01
Payer: MEDICARE

## 2022-01-01 ENCOUNTER — TELEPHONE (OUTPATIENT)
Dept: PRIMARY CARE CLINIC | Facility: CLINIC | Age: 67
End: 2022-01-01
Payer: MEDICARE

## 2022-01-01 ENCOUNTER — OFFICE VISIT (OUTPATIENT)
Dept: SURGERY | Facility: CLINIC | Age: 67
End: 2022-01-01
Payer: MEDICARE

## 2022-01-01 ENCOUNTER — HOSPITAL ENCOUNTER (INPATIENT)
Facility: OTHER | Age: 67
LOS: 18 days | Discharge: HOME | DRG: 291 | End: 2022-09-13
Attending: EMERGENCY MEDICINE | Admitting: HOSPITALIST
Payer: MEDICARE

## 2022-01-01 ENCOUNTER — TELEPHONE (OUTPATIENT)
Dept: ADMINISTRATIVE | Facility: OTHER | Age: 67
End: 2022-01-01
Payer: MEDICARE

## 2022-01-01 ENCOUNTER — HOSPITAL ENCOUNTER (INPATIENT)
Facility: OTHER | Age: 67
LOS: 6 days | Discharge: HOME HEALTH | DRG: 291 | End: 2022-05-31
Attending: EMERGENCY MEDICINE | Admitting: INTERNAL MEDICINE
Payer: MEDICARE

## 2022-01-01 ENCOUNTER — PES CALL (OUTPATIENT)
Dept: ADMINISTRATIVE | Facility: CLINIC | Age: 67
End: 2022-01-01
Payer: MEDICARE

## 2022-01-01 ENCOUNTER — PATIENT MESSAGE (OUTPATIENT)
Dept: ADMINISTRATIVE | Facility: CLINIC | Age: 67
End: 2022-01-01
Payer: MEDICARE

## 2022-01-01 ENCOUNTER — OFFICE VISIT (OUTPATIENT)
Dept: CARDIOLOGY | Facility: CLINIC | Age: 67
End: 2022-01-01
Payer: MEDICARE

## 2022-01-01 ENCOUNTER — LAB VISIT (OUTPATIENT)
Dept: LAB | Facility: HOSPITAL | Age: 67
End: 2022-01-01
Attending: STUDENT IN AN ORGANIZED HEALTH CARE EDUCATION/TRAINING PROGRAM
Payer: MEDICARE

## 2022-01-01 ENCOUNTER — HOSPITAL ENCOUNTER (INPATIENT)
Facility: OTHER | Age: 67
LOS: 5 days | Discharge: HOME | DRG: 602 | End: 2022-07-23
Attending: EMERGENCY MEDICINE | Admitting: HOSPITALIST
Payer: MEDICARE

## 2022-01-01 ENCOUNTER — HOSPITAL ENCOUNTER (INPATIENT)
Facility: HOSPITAL | Age: 67
LOS: 5 days | Discharge: HOME | DRG: 291 | End: 2022-08-08
Attending: EMERGENCY MEDICINE | Admitting: FAMILY MEDICINE
Payer: MEDICARE

## 2022-01-01 ENCOUNTER — TELEPHONE (OUTPATIENT)
Dept: INTERNAL MEDICINE | Facility: CLINIC | Age: 67
End: 2022-01-01
Payer: MEDICARE

## 2022-01-01 ENCOUNTER — TELEPHONE (OUTPATIENT)
Dept: WOUND CARE | Facility: CLINIC | Age: 67
End: 2022-01-01
Payer: MEDICARE

## 2022-01-01 ENCOUNTER — HOSPITAL ENCOUNTER (INPATIENT)
Facility: HOSPITAL | Age: 67
LOS: 9 days | Discharge: HOME HEALTH | DRG: 919 | End: 2022-12-31
Attending: STUDENT IN AN ORGANIZED HEALTH CARE EDUCATION/TRAINING PROGRAM | Admitting: STUDENT IN AN ORGANIZED HEALTH CARE EDUCATION/TRAINING PROGRAM
Payer: MEDICARE

## 2022-01-01 VITALS
OXYGEN SATURATION: 98 % | SYSTOLIC BLOOD PRESSURE: 98 MMHG | DIASTOLIC BLOOD PRESSURE: 51 MMHG | OXYGEN SATURATION: 99 % | HEIGHT: 63 IN | BODY MASS INDEX: 20.54 KG/M2 | TEMPERATURE: 98 F | HEART RATE: 80 BPM | WEIGHT: 115.94 LBS | DIASTOLIC BLOOD PRESSURE: 48 MMHG | BODY MASS INDEX: 20.98 KG/M2 | HEART RATE: 83 BPM | SYSTOLIC BLOOD PRESSURE: 100 MMHG | DIASTOLIC BLOOD PRESSURE: 56 MMHG | HEIGHT: 63 IN | HEIGHT: 63 IN | RESPIRATION RATE: 18 BRPM | WEIGHT: 118.38 LBS | BODY MASS INDEX: 20.04 KG/M2 | WEIGHT: 113.13 LBS | OXYGEN SATURATION: 99 % | HEART RATE: 78 BPM | SYSTOLIC BLOOD PRESSURE: 92 MMHG | TEMPERATURE: 97 F | RESPIRATION RATE: 18 BRPM

## 2022-01-01 VITALS
TEMPERATURE: 98 F | RESPIRATION RATE: 18 BRPM | OXYGEN SATURATION: 97 % | HEART RATE: 89 BPM | WEIGHT: 130.94 LBS | SYSTOLIC BLOOD PRESSURE: 103 MMHG | RESPIRATION RATE: 12 BRPM | HEIGHT: 63 IN | DIASTOLIC BLOOD PRESSURE: 51 MMHG | BODY MASS INDEX: 23.2 KG/M2 | SYSTOLIC BLOOD PRESSURE: 109 MMHG | DIASTOLIC BLOOD PRESSURE: 53 MMHG | HEART RATE: 93 BPM | WEIGHT: 118.81 LBS | OXYGEN SATURATION: 100 % | BODY MASS INDEX: 21.05 KG/M2 | TEMPERATURE: 98 F | HEIGHT: 63 IN

## 2022-01-01 VITALS — BODY MASS INDEX: 19.76 KG/M2 | HEIGHT: 63 IN | WEIGHT: 111.5 LBS

## 2022-01-01 VITALS
SYSTOLIC BLOOD PRESSURE: 110 MMHG | DIASTOLIC BLOOD PRESSURE: 60 MMHG | WEIGHT: 109.38 LBS | HEART RATE: 74 BPM | HEART RATE: 94 BPM | BODY MASS INDEX: 19.18 KG/M2 | DIASTOLIC BLOOD PRESSURE: 47 MMHG | SYSTOLIC BLOOD PRESSURE: 98 MMHG | WEIGHT: 108.25 LBS | RESPIRATION RATE: 19 BRPM | RESPIRATION RATE: 16 BRPM | OXYGEN SATURATION: 97 % | HEIGHT: 63 IN | BODY MASS INDEX: 19.38 KG/M2 | HEIGHT: 63 IN | TEMPERATURE: 98 F | OXYGEN SATURATION: 99 %

## 2022-01-01 VITALS
BODY MASS INDEX: 20.02 KG/M2 | SYSTOLIC BLOOD PRESSURE: 101 MMHG | HEART RATE: 86 BPM | WEIGHT: 113 LBS | DIASTOLIC BLOOD PRESSURE: 51 MMHG | HEIGHT: 63 IN

## 2022-01-01 VITALS
RESPIRATION RATE: 18 BRPM | SYSTOLIC BLOOD PRESSURE: 113 MMHG | DIASTOLIC BLOOD PRESSURE: 62 MMHG | RESPIRATION RATE: 18 BRPM | BODY MASS INDEX: 20.91 KG/M2 | HEART RATE: 87 BPM | SYSTOLIC BLOOD PRESSURE: 98 MMHG | OXYGEN SATURATION: 98 % | HEIGHT: 63 IN | TEMPERATURE: 98 F | WEIGHT: 111 LBS | BODY MASS INDEX: 19.67 KG/M2 | DIASTOLIC BLOOD PRESSURE: 62 MMHG | HEART RATE: 69 BPM | HEIGHT: 63 IN | WEIGHT: 118 LBS | OXYGEN SATURATION: 97 %

## 2022-01-01 VITALS
HEIGHT: 63 IN | RESPIRATION RATE: 18 BRPM | BODY MASS INDEX: 21.05 KG/M2 | TEMPERATURE: 99 F | HEART RATE: 87 BPM | OXYGEN SATURATION: 95 % | DIASTOLIC BLOOD PRESSURE: 50 MMHG | SYSTOLIC BLOOD PRESSURE: 106 MMHG

## 2022-01-01 VITALS
HEART RATE: 91 BPM | HEIGHT: 63 IN | SYSTOLIC BLOOD PRESSURE: 90 MMHG | OXYGEN SATURATION: 96 % | BODY MASS INDEX: 19.92 KG/M2 | RESPIRATION RATE: 16 BRPM | WEIGHT: 112.44 LBS | DIASTOLIC BLOOD PRESSURE: 50 MMHG

## 2022-01-01 VITALS
SYSTOLIC BLOOD PRESSURE: 91 MMHG | HEART RATE: 82 BPM | BODY MASS INDEX: 20.51 KG/M2 | WEIGHT: 115.81 LBS | DIASTOLIC BLOOD PRESSURE: 57 MMHG

## 2022-01-01 VITALS
SYSTOLIC BLOOD PRESSURE: 94 MMHG | HEIGHT: 63 IN | HEART RATE: 55 BPM | OXYGEN SATURATION: 99 % | WEIGHT: 116.94 LBS | DIASTOLIC BLOOD PRESSURE: 60 MMHG | BODY MASS INDEX: 20.72 KG/M2

## 2022-01-01 VITALS
HEART RATE: 84 BPM | SYSTOLIC BLOOD PRESSURE: 105 MMHG | OXYGEN SATURATION: 96 % | BODY MASS INDEX: 20.36 KG/M2 | TEMPERATURE: 98 F | WEIGHT: 114.88 LBS | RESPIRATION RATE: 18 BRPM | DIASTOLIC BLOOD PRESSURE: 55 MMHG | HEIGHT: 63 IN

## 2022-01-01 VITALS
SYSTOLIC BLOOD PRESSURE: 100 MMHG | WEIGHT: 110.25 LBS | HEIGHT: 63 IN | HEART RATE: 82 BPM | DIASTOLIC BLOOD PRESSURE: 64 MMHG | BODY MASS INDEX: 19.54 KG/M2 | OXYGEN SATURATION: 99 %

## 2022-01-01 VITALS
SYSTOLIC BLOOD PRESSURE: 108 MMHG | HEIGHT: 63 IN | OXYGEN SATURATION: 98 % | DIASTOLIC BLOOD PRESSURE: 59 MMHG | HEART RATE: 85 BPM | BODY MASS INDEX: 20.9 KG/M2

## 2022-01-01 DIAGNOSIS — Z93.3 COLOSTOMY IN PLACE: ICD-10-CM

## 2022-01-01 DIAGNOSIS — I50.23 ACUTE ON CHRONIC SYSTOLIC HEART FAILURE: ICD-10-CM

## 2022-01-01 DIAGNOSIS — R10.13 EPIGASTRIC PAIN: Primary | ICD-10-CM

## 2022-01-01 DIAGNOSIS — R89.9 ABNORMAL LABORATORY TEST: ICD-10-CM

## 2022-01-01 DIAGNOSIS — R60.0 PERIPHERAL EDEMA: ICD-10-CM

## 2022-01-01 DIAGNOSIS — L02.91 ABSCESS: ICD-10-CM

## 2022-01-01 DIAGNOSIS — M25.519 SHOULDER PAIN, UNSPECIFIED CHRONICITY, UNSPECIFIED LATERALITY: Primary | ICD-10-CM

## 2022-01-01 DIAGNOSIS — Z90.49 HISTORY OF PARTIAL COLECTOMY: ICD-10-CM

## 2022-01-01 DIAGNOSIS — N32.1 COLOVESICAL FISTULA: Primary | ICD-10-CM

## 2022-01-01 DIAGNOSIS — G89.29 CHRONIC MIDLINE LOW BACK PAIN WITHOUT SCIATICA: ICD-10-CM

## 2022-01-01 DIAGNOSIS — Z95.5 HISTORY OF CORONARY ARTERY STENT PLACEMENT: ICD-10-CM

## 2022-01-01 DIAGNOSIS — R62.7 FTT (FAILURE TO THRIVE) IN ADULT: ICD-10-CM

## 2022-01-01 DIAGNOSIS — I50.9 CHF (CONGESTIVE HEART FAILURE): ICD-10-CM

## 2022-01-01 DIAGNOSIS — E87.1 HYPONATREMIA: ICD-10-CM

## 2022-01-01 DIAGNOSIS — R29.898 WEAKNESS OF BOTH LOWER EXTREMITIES: Primary | ICD-10-CM

## 2022-01-01 DIAGNOSIS — R23.9 ALTERATION IN SKIN INTEGRITY IN ADULT: ICD-10-CM

## 2022-01-01 DIAGNOSIS — I25.5 ISCHEMIC CARDIOMYOPATHY: ICD-10-CM

## 2022-01-01 DIAGNOSIS — F41.9 ANXIETY: ICD-10-CM

## 2022-01-01 DIAGNOSIS — Z71.89 ENCOUNTER FOR OSTOMY CARE EDUCATION: ICD-10-CM

## 2022-01-01 DIAGNOSIS — Z43.3 ATTENTION TO COLOSTOMY: Primary | ICD-10-CM

## 2022-01-01 DIAGNOSIS — Z95.810 AUTOMATIC IMPLANTABLE CARDIOVERTER-DEFIBRILLATOR IN SITU: ICD-10-CM

## 2022-01-01 DIAGNOSIS — I50.22 CHRONIC SYSTOLIC CONGESTIVE HEART FAILURE: ICD-10-CM

## 2022-01-01 DIAGNOSIS — D64.9 NORMOCYTIC ANEMIA: ICD-10-CM

## 2022-01-01 DIAGNOSIS — I25.118 CORONARY ARTERY DISEASE OF NATIVE ARTERY OF NATIVE HEART WITH STABLE ANGINA PECTORIS: ICD-10-CM

## 2022-01-01 DIAGNOSIS — J40 BRONCHITIS: ICD-10-CM

## 2022-01-01 DIAGNOSIS — I95.0 IDIOPATHIC HYPOTENSION: ICD-10-CM

## 2022-01-01 DIAGNOSIS — E88.09 HYPOALBUMINEMIA: ICD-10-CM

## 2022-01-01 DIAGNOSIS — M54.50 CHRONIC MIDLINE LOW BACK PAIN WITHOUT SCIATICA: ICD-10-CM

## 2022-01-01 DIAGNOSIS — I10 PRIMARY HYPERTENSION: ICD-10-CM

## 2022-01-01 DIAGNOSIS — Z95.810 AUTOMATIC IMPLANTABLE CARDIOVERTER-DEFIBRILLATOR IN SITU: Primary | ICD-10-CM

## 2022-01-01 DIAGNOSIS — I50.42 HEART FAILURE, SYSTOLIC AND DIASTOLIC, CHRONIC: ICD-10-CM

## 2022-01-01 DIAGNOSIS — I50.42 CHRONIC COMBINED SYSTOLIC AND DIASTOLIC CONGESTIVE HEART FAILURE: ICD-10-CM

## 2022-01-01 DIAGNOSIS — Z16.21 VRE (VANCOMYCIN-RESISTANT ENTEROCOCCI) INFECTION: ICD-10-CM

## 2022-01-01 DIAGNOSIS — I25.10 CORONARY ARTERY DISEASE INVOLVING NATIVE CORONARY ARTERY OF NATIVE HEART WITHOUT ANGINA PECTORIS: ICD-10-CM

## 2022-01-01 DIAGNOSIS — Z87.19 HISTORY OF DIVERTICULITIS: ICD-10-CM

## 2022-01-01 DIAGNOSIS — I50.9 ACUTE ON CHRONIC CONGESTIVE HEART FAILURE, UNSPECIFIED HEART FAILURE TYPE: Primary | ICD-10-CM

## 2022-01-01 DIAGNOSIS — R14.1 ABDOMINAL GAS PAIN: ICD-10-CM

## 2022-01-01 DIAGNOSIS — I49.9 ARRHYTHMIA: ICD-10-CM

## 2022-01-01 DIAGNOSIS — I48.20 ATRIAL FIBRILLATION, CHRONIC: ICD-10-CM

## 2022-01-01 DIAGNOSIS — K63.2 ENTEROCUTANEOUS FISTULA: ICD-10-CM

## 2022-01-01 DIAGNOSIS — K63.2 ENTEROCUTANEOUS FISTULA: Primary | ICD-10-CM

## 2022-01-01 DIAGNOSIS — R07.9 ACUTE CHEST PAIN: ICD-10-CM

## 2022-01-01 DIAGNOSIS — Z87.891 FORMER SMOKER: ICD-10-CM

## 2022-01-01 DIAGNOSIS — I45.2 RBBB WITH LEFT ANTERIOR FASCICULAR BLOCK: ICD-10-CM

## 2022-01-01 DIAGNOSIS — R10.13 EPIGASTRIC PAIN: ICD-10-CM

## 2022-01-01 DIAGNOSIS — K63.2 COLOCUTANEOUS FISTULA: Primary | ICD-10-CM

## 2022-01-01 DIAGNOSIS — Z43.3 ATTENTION TO COLOSTOMY: ICD-10-CM

## 2022-01-01 DIAGNOSIS — R06.02 SOB (SHORTNESS OF BREATH): ICD-10-CM

## 2022-01-01 DIAGNOSIS — T81.31XD SUPERFICIAL DISRUPTION OR DEHISCENCE OF OPERATION WOUND, SUBSEQUENT ENCOUNTER: ICD-10-CM

## 2022-01-01 DIAGNOSIS — N32.1 COLOVESICAL FISTULA: ICD-10-CM

## 2022-01-01 DIAGNOSIS — I50.42 CHRONIC COMBINED SYSTOLIC AND DIASTOLIC CONGESTIVE HEART FAILURE: Primary | ICD-10-CM

## 2022-01-01 DIAGNOSIS — R05.9 COUGH: ICD-10-CM

## 2022-01-01 DIAGNOSIS — F32.A DEPRESSION, UNSPECIFIED DEPRESSION TYPE: ICD-10-CM

## 2022-01-01 DIAGNOSIS — R94.31 PROLONGED Q-T INTERVAL ON ECG: ICD-10-CM

## 2022-01-01 DIAGNOSIS — R06.02 SHORTNESS OF BREATH: ICD-10-CM

## 2022-01-01 DIAGNOSIS — R53.82 CHRONIC FATIGUE: ICD-10-CM

## 2022-01-01 DIAGNOSIS — R53.81 DEBILITY: ICD-10-CM

## 2022-01-01 DIAGNOSIS — I50.9 CHRONIC CONGESTIVE HEART FAILURE, UNSPECIFIED HEART FAILURE TYPE: ICD-10-CM

## 2022-01-01 DIAGNOSIS — L03.90 CELLULITIS, UNSPECIFIED CELLULITIS SITE: ICD-10-CM

## 2022-01-01 DIAGNOSIS — I50.23 ACUTE ON CHRONIC SYSTOLIC CONGESTIVE HEART FAILURE: ICD-10-CM

## 2022-01-01 DIAGNOSIS — R07.9 CHEST PAIN: ICD-10-CM

## 2022-01-01 DIAGNOSIS — L02.211 ABDOMINAL WALL ABSCESS: ICD-10-CM

## 2022-01-01 DIAGNOSIS — Z12.31 ENCOUNTER FOR SCREENING MAMMOGRAM FOR BREAST CANCER: ICD-10-CM

## 2022-01-01 DIAGNOSIS — Z95.810 AICD (AUTOMATIC CARDIOVERTER/DEFIBRILLATOR) PRESENT: ICD-10-CM

## 2022-01-01 DIAGNOSIS — R82.71 ASYMPTOMATIC BACTERIURIA: ICD-10-CM

## 2022-01-01 DIAGNOSIS — Z93.3 S/P COLOSTOMY: ICD-10-CM

## 2022-01-01 DIAGNOSIS — I25.2 HISTORY OF MYOCARDIAL INFARCTION: ICD-10-CM

## 2022-01-01 DIAGNOSIS — A49.1 VRE (VANCOMYCIN-RESISTANT ENTEROCOCCI) INFECTION: ICD-10-CM

## 2022-01-01 DIAGNOSIS — D64.9 ANEMIA, UNSPECIFIED TYPE: ICD-10-CM

## 2022-01-01 DIAGNOSIS — I50.9 CONGESTIVE HEART FAILURE, UNSPECIFIED HF CHRONICITY, UNSPECIFIED HEART FAILURE TYPE: Primary | ICD-10-CM

## 2022-01-01 DIAGNOSIS — T14.8XXA NONHEALING NONSURGICAL WOUND: Primary | ICD-10-CM

## 2022-01-01 DIAGNOSIS — N30.00 ACUTE CYSTITIS WITHOUT HEMATURIA: ICD-10-CM

## 2022-01-01 DIAGNOSIS — L89.159 PRESSURE INJURY OF SKIN OF SACRAL REGION, UNSPECIFIED INJURY STAGE: Primary | ICD-10-CM

## 2022-01-01 DIAGNOSIS — J96.11 CHRONIC HYPOXEMIC RESPIRATORY FAILURE: Primary | ICD-10-CM

## 2022-01-01 DIAGNOSIS — E43 SEVERE PROTEIN-CALORIE MALNUTRITION: ICD-10-CM

## 2022-01-01 DIAGNOSIS — I50.23 ACUTE ON CHRONIC SYSTOLIC HEART FAILURE: Primary | ICD-10-CM

## 2022-01-01 DIAGNOSIS — L03.311 CELLULITIS OF ABDOMINAL WALL: Primary | ICD-10-CM

## 2022-01-01 DIAGNOSIS — L89.153 SACRAL DECUBITUS ULCER, STAGE III: ICD-10-CM

## 2022-01-01 DIAGNOSIS — R63.4 WEIGHT LOSS: ICD-10-CM

## 2022-01-01 DIAGNOSIS — I50.30 CONGESTIVE HEART FAILURE WITH LEFT VENTRICULAR DIASTOLIC DYSFUNCTION, NYHA CLASS 2: ICD-10-CM

## 2022-01-01 DIAGNOSIS — D72.819 LEUKOPENIA, UNSPECIFIED TYPE: ICD-10-CM

## 2022-01-01 DIAGNOSIS — R11.2 NON-INTRACTABLE VOMITING WITH NAUSEA, UNSPECIFIED VOMITING TYPE: ICD-10-CM

## 2022-01-01 DIAGNOSIS — Z78.9 STATIN INTOLERANCE: ICD-10-CM

## 2022-01-01 DIAGNOSIS — I50.41 ACUTE COMBINED SYSTOLIC AND DIASTOLIC CONGESTIVE HEART FAILURE: Primary | ICD-10-CM

## 2022-01-01 DIAGNOSIS — E78.01 FAMILIAL HYPERCHOLESTEROLEMIA: ICD-10-CM

## 2022-01-01 DIAGNOSIS — I25.118 CORONARY ARTERY DISEASE OF NATIVE ARTERY OF NATIVE HEART WITH STABLE ANGINA PECTORIS: Primary | ICD-10-CM

## 2022-01-01 DIAGNOSIS — R53.1 WEAKNESS: ICD-10-CM

## 2022-01-01 DIAGNOSIS — N39.0 URINARY TRACT INFECTION WITHOUT HEMATURIA, SITE UNSPECIFIED: ICD-10-CM

## 2022-01-01 DIAGNOSIS — J44.9 CHRONIC OBSTRUCTIVE PULMONARY DISEASE, UNSPECIFIED COPD TYPE: ICD-10-CM

## 2022-01-01 DIAGNOSIS — F33.0 MILD EPISODE OF RECURRENT MAJOR DEPRESSIVE DISORDER: ICD-10-CM

## 2022-01-01 DIAGNOSIS — R11.0 NAUSEA: ICD-10-CM

## 2022-01-01 LAB
ABO + RH BLD: NORMAL
ALBUMIN SERPL BCP-MCNC: 1.5 G/DL (ref 3.5–5.2)
ALBUMIN SERPL BCP-MCNC: 1.6 G/DL (ref 3.5–5.2)
ALBUMIN SERPL BCP-MCNC: 1.6 G/DL (ref 3.5–5.2)
ALBUMIN SERPL BCP-MCNC: 1.7 G/DL (ref 3.5–5.2)
ALBUMIN SERPL BCP-MCNC: 1.8 G/DL (ref 3.5–5.2)
ALBUMIN SERPL BCP-MCNC: 1.9 G/DL (ref 3.5–5.2)
ALBUMIN SERPL BCP-MCNC: 2.1 G/DL (ref 3.5–5.2)
ALBUMIN SERPL BCP-MCNC: 2.2 G/DL (ref 3.5–5.2)
ALBUMIN SERPL BCP-MCNC: 2.2 G/DL (ref 3.5–5.2)
ALP SERPL-CCNC: 101 U/L (ref 55–135)
ALP SERPL-CCNC: 104 U/L (ref 55–135)
ALP SERPL-CCNC: 104 U/L (ref 55–135)
ALP SERPL-CCNC: 108 U/L (ref 55–135)
ALP SERPL-CCNC: 113 U/L (ref 55–135)
ALP SERPL-CCNC: 128 U/L (ref 55–135)
ALP SERPL-CCNC: 134 U/L (ref 55–135)
ALP SERPL-CCNC: 138 U/L (ref 55–135)
ALP SERPL-CCNC: 142 U/L (ref 55–135)
ALP SERPL-CCNC: 66 U/L (ref 55–135)
ALP SERPL-CCNC: 80 U/L (ref 55–135)
ALP SERPL-CCNC: 81 U/L (ref 55–135)
ALP SERPL-CCNC: 87 U/L (ref 55–135)
ALP SERPL-CCNC: 88 U/L (ref 55–135)
ALP SERPL-CCNC: 88 U/L (ref 55–135)
ALP SERPL-CCNC: 89 U/L (ref 55–135)
ALP SERPL-CCNC: 93 U/L (ref 55–135)
ALP SERPL-CCNC: 94 U/L (ref 55–135)
ALP SERPL-CCNC: 96 U/L (ref 55–135)
ALP SERPL-CCNC: 99 U/L (ref 55–135)
ALT SERPL W/O P-5'-P-CCNC: 11 U/L (ref 10–44)
ALT SERPL W/O P-5'-P-CCNC: 12 U/L (ref 10–44)
ALT SERPL W/O P-5'-P-CCNC: 13 U/L (ref 10–44)
ALT SERPL W/O P-5'-P-CCNC: 13 U/L (ref 10–44)
ALT SERPL W/O P-5'-P-CCNC: 14 U/L (ref 10–44)
ALT SERPL W/O P-5'-P-CCNC: 14 U/L (ref 10–44)
ALT SERPL W/O P-5'-P-CCNC: 15 U/L (ref 10–44)
ALT SERPL W/O P-5'-P-CCNC: 18 U/L (ref 10–44)
ALT SERPL W/O P-5'-P-CCNC: 21 U/L (ref 10–44)
ALT SERPL W/O P-5'-P-CCNC: 22 U/L (ref 10–44)
ALT SERPL W/O P-5'-P-CCNC: 26 U/L (ref 10–44)
ALT SERPL W/O P-5'-P-CCNC: 30 U/L (ref 10–44)
ALT SERPL W/O P-5'-P-CCNC: 34 U/L (ref 10–44)
ALT SERPL W/O P-5'-P-CCNC: 41 U/L (ref 10–44)
ANION GAP SERPL CALC-SCNC: 10 MMOL/L (ref 8–16)
ANION GAP SERPL CALC-SCNC: 11 MMOL/L (ref 8–16)
ANION GAP SERPL CALC-SCNC: 12 MMOL/L (ref 8–16)
ANION GAP SERPL CALC-SCNC: 13 MMOL/L (ref 8–16)
ANION GAP SERPL CALC-SCNC: 13 MMOL/L (ref 8–16)
ANION GAP SERPL CALC-SCNC: 14 MMOL/L (ref 8–16)
ANION GAP SERPL CALC-SCNC: 5 MMOL/L (ref 8–16)
ANION GAP SERPL CALC-SCNC: 6 MMOL/L (ref 8–16)
ANION GAP SERPL CALC-SCNC: 7 MMOL/L (ref 8–16)
ANION GAP SERPL CALC-SCNC: 8 MMOL/L (ref 8–16)
ANION GAP SERPL CALC-SCNC: 9 MMOL/L (ref 8–16)
ANISOCYTOSIS BLD QL SMEAR: SLIGHT
ASCENDING AORTA: 2.82 CM
ASCENDING AORTA: 3.24 CM
ASCENDING AORTA: 3.27 CM
AST SERPL-CCNC: 16 U/L (ref 10–40)
AST SERPL-CCNC: 18 U/L (ref 10–40)
AST SERPL-CCNC: 19 U/L (ref 10–40)
AST SERPL-CCNC: 20 U/L (ref 10–40)
AST SERPL-CCNC: 20 U/L (ref 10–40)
AST SERPL-CCNC: 22 U/L (ref 10–40)
AST SERPL-CCNC: 24 U/L (ref 10–40)
AST SERPL-CCNC: 25 U/L (ref 10–40)
AST SERPL-CCNC: 27 U/L (ref 10–40)
AST SERPL-CCNC: 27 U/L (ref 10–40)
AST SERPL-CCNC: 43 U/L (ref 10–40)
AST SERPL-CCNC: 45 U/L (ref 10–40)
AST SERPL-CCNC: 45 U/L (ref 10–40)
AST SERPL-CCNC: 72 U/L (ref 10–40)
AST SERPL-CCNC: 97 U/L (ref 10–40)
AV INDEX (PROSTH): 0.46
AV INDEX (PROSTH): 0.65
AV INDEX (PROSTH): 0.68
AV INDEX (PROSTH): 0.74
AV MEAN GRADIENT: 4 MMHG
AV MEAN GRADIENT: 4 MMHG
AV MEAN GRADIENT: 5 MMHG
AV MEAN GRADIENT: 6 MMHG
AV PEAK GRADIENT: 11 MMHG
AV PEAK GRADIENT: 11 MMHG
AV PEAK GRADIENT: 7 MMHG
AV PEAK GRADIENT: 7 MMHG
AV VALVE AREA: 1.24 CM2
AV VALVE AREA: 1.65 CM2
AV VALVE AREA: 2.2 CM2
AV VALVE AREA: 2.54 CM2
AV VELOCITY RATIO: 0.63
AV VELOCITY RATIO: 0.71
AV VELOCITY RATIO: 0.8
AV VELOCITY RATIO: 0.81
BACTERIA #/AREA URNS AUTO: ABNORMAL /HPF
BACTERIA #/AREA URNS HPF: ABNORMAL /HPF
BACTERIA BLD CULT: NORMAL
BACTERIA BLD CULT: NORMAL
BACTERIA UR CULT: ABNORMAL
BACTERIA UR CULT: ABNORMAL
BASOPHILS # BLD AUTO: 0.01 K/UL (ref 0–0.2)
BASOPHILS # BLD AUTO: 0.02 K/UL (ref 0–0.2)
BASOPHILS # BLD AUTO: 0.03 K/UL (ref 0–0.2)
BASOPHILS # BLD AUTO: 0.04 K/UL (ref 0–0.2)
BASOPHILS # BLD AUTO: 0.05 K/UL (ref 0–0.2)
BASOPHILS # BLD AUTO: 0.05 K/UL (ref 0–0.2)
BASOPHILS # BLD AUTO: ABNORMAL K/UL (ref 0–0.2)
BASOPHILS NFR BLD: 0 % (ref 0–1.9)
BASOPHILS NFR BLD: 0.2 % (ref 0–1.9)
BASOPHILS NFR BLD: 0.3 % (ref 0–1.9)
BASOPHILS NFR BLD: 0.4 % (ref 0–1.9)
BASOPHILS NFR BLD: 0.5 % (ref 0–1.9)
BASOPHILS NFR BLD: 0.6 % (ref 0–1.9)
BASOPHILS NFR BLD: 0.7 % (ref 0–1.9)
BASOPHILS NFR BLD: 0.8 % (ref 0–1.9)
BASOPHILS NFR BLD: 0.9 % (ref 0–1.9)
BASOPHILS NFR BLD: 0.9 % (ref 0–1.9)
BASOPHILS NFR BLD: 1 % (ref 0–1.9)
BASOPHILS NFR BLD: 1.1 % (ref 0–1.9)
BASOPHILS NFR BLD: 1.1 % (ref 0–1.9)
BASOPHILS NFR BLD: 1.2 % (ref 0–1.9)
BASOPHILS NFR BLD: 1.4 % (ref 0–1.9)
BILIRUB DIRECT SERPL-MCNC: 0.7 MG/DL (ref 0.1–0.3)
BILIRUB SERPL-MCNC: 0.5 MG/DL (ref 0.1–1)
BILIRUB SERPL-MCNC: 0.6 MG/DL (ref 0.1–1)
BILIRUB SERPL-MCNC: 0.7 MG/DL (ref 0.1–1)
BILIRUB SERPL-MCNC: 0.8 MG/DL (ref 0.1–1)
BILIRUB SERPL-MCNC: 0.9 MG/DL (ref 0.1–1)
BILIRUB SERPL-MCNC: 1 MG/DL (ref 0.1–1)
BILIRUB SERPL-MCNC: 1.1 MG/DL (ref 0.1–1)
BILIRUB SERPL-MCNC: 1.2 MG/DL (ref 0.1–1)
BILIRUB SERPL-MCNC: 1.2 MG/DL (ref 0.1–1)
BILIRUB SERPL-MCNC: 1.3 MG/DL (ref 0.1–1)
BILIRUB SERPL-MCNC: 1.5 MG/DL (ref 0.1–1)
BILIRUB UR QL STRIP: NEGATIVE
BLD GP AB SCN CELLS X3 SERPL QL: NORMAL
BNP SERPL-MCNC: 1321 PG/ML (ref 0–99)
BNP SERPL-MCNC: 1847 PG/ML (ref 0–99)
BNP SERPL-MCNC: 1922 PG/ML (ref 0–99)
BNP SERPL-MCNC: 2596 PG/ML (ref 0–99)
BNP SERPL-MCNC: 2699 PG/ML (ref 0–99)
BNP SERPL-MCNC: 3340 PG/ML (ref 0–99)
BNP SERPL-MCNC: 3420 PG/ML (ref 0–99)
BNP SERPL-MCNC: 3698 PG/ML (ref 0–99)
BNP SERPL-MCNC: 4567 PG/ML (ref 0–99)
BNP SERPL-MCNC: >4900 PG/ML (ref 0–99)
BODY FLUID SOURCE, CREATININE: NORMAL
BSA FOR ECHO PROCEDURE: 1.54 M2
BSA FOR ECHO PROCEDURE: 1.57 M2
BSA FOR ECHO PROCEDURE: 1.63 M2
BUN SERPL-MCNC: 10 MG/DL (ref 8–23)
BUN SERPL-MCNC: 11 MG/DL (ref 8–23)
BUN SERPL-MCNC: 12 MG/DL (ref 8–23)
BUN SERPL-MCNC: 13 MG/DL (ref 8–23)
BUN SERPL-MCNC: 14 MG/DL (ref 8–23)
BUN SERPL-MCNC: 15 MG/DL (ref 8–23)
BUN SERPL-MCNC: 16 MG/DL (ref 8–23)
BUN SERPL-MCNC: 16 MG/DL (ref 8–23)
BUN SERPL-MCNC: 17 MG/DL (ref 8–23)
BUN SERPL-MCNC: 17 MG/DL (ref 8–23)
BUN SERPL-MCNC: 18 MG/DL (ref 8–23)
BUN SERPL-MCNC: 19 MG/DL (ref 8–23)
BUN SERPL-MCNC: 20 MG/DL (ref 8–23)
BUN SERPL-MCNC: 21 MG/DL (ref 8–23)
BUN SERPL-MCNC: 22 MG/DL (ref 8–23)
BUN SERPL-MCNC: 23 MG/DL (ref 8–23)
BUN SERPL-MCNC: 24 MG/DL (ref 8–23)
BUN SERPL-MCNC: 26 MG/DL (ref 8–23)
BUN SERPL-MCNC: 8 MG/DL (ref 8–23)
BUN SERPL-MCNC: 9 MG/DL (ref 8–23)
CALCIUM SERPL-MCNC: 7.6 MG/DL (ref 8.7–10.5)
CALCIUM SERPL-MCNC: 7.6 MG/DL (ref 8.7–10.5)
CALCIUM SERPL-MCNC: 7.7 MG/DL (ref 8.7–10.5)
CALCIUM SERPL-MCNC: 7.8 MG/DL (ref 8.7–10.5)
CALCIUM SERPL-MCNC: 7.8 MG/DL (ref 8.7–10.5)
CALCIUM SERPL-MCNC: 7.9 MG/DL (ref 8.7–10.5)
CALCIUM SERPL-MCNC: 8 MG/DL (ref 8.7–10.5)
CALCIUM SERPL-MCNC: 8.1 MG/DL (ref 8.7–10.5)
CALCIUM SERPL-MCNC: 8.2 MG/DL (ref 8.7–10.5)
CALCIUM SERPL-MCNC: 8.3 MG/DL (ref 8.7–10.5)
CALCIUM SERPL-MCNC: 8.4 MG/DL (ref 8.7–10.5)
CALCIUM SERPL-MCNC: 8.5 MG/DL (ref 8.7–10.5)
CALCIUM SERPL-MCNC: 8.6 MG/DL (ref 8.7–10.5)
CALCIUM SERPL-MCNC: 8.7 MG/DL (ref 8.7–10.5)
CALCIUM SERPL-MCNC: 8.8 MG/DL (ref 8.7–10.5)
CALCIUM SERPL-MCNC: 8.9 MG/DL (ref 8.7–10.5)
CALCIUM SERPL-MCNC: 9.1 MG/DL (ref 8.7–10.5)
CALCIUM SERPL-MCNC: 9.1 MG/DL (ref 8.7–10.5)
CHLORIDE SERPL-SCNC: 100 MMOL/L (ref 95–110)
CHLORIDE SERPL-SCNC: 101 MMOL/L (ref 95–110)
CHLORIDE SERPL-SCNC: 102 MMOL/L (ref 95–110)
CHLORIDE SERPL-SCNC: 102 MMOL/L (ref 95–110)
CHLORIDE SERPL-SCNC: 86 MMOL/L (ref 95–110)
CHLORIDE SERPL-SCNC: 87 MMOL/L (ref 95–110)
CHLORIDE SERPL-SCNC: 88 MMOL/L (ref 95–110)
CHLORIDE SERPL-SCNC: 89 MMOL/L (ref 95–110)
CHLORIDE SERPL-SCNC: 90 MMOL/L (ref 95–110)
CHLORIDE SERPL-SCNC: 90 MMOL/L (ref 95–110)
CHLORIDE SERPL-SCNC: 91 MMOL/L (ref 95–110)
CHLORIDE SERPL-SCNC: 92 MMOL/L (ref 95–110)
CHLORIDE SERPL-SCNC: 93 MMOL/L (ref 95–110)
CHLORIDE SERPL-SCNC: 94 MMOL/L (ref 95–110)
CHLORIDE SERPL-SCNC: 95 MMOL/L (ref 95–110)
CHLORIDE SERPL-SCNC: 96 MMOL/L (ref 95–110)
CHLORIDE SERPL-SCNC: 97 MMOL/L (ref 95–110)
CHLORIDE SERPL-SCNC: 98 MMOL/L (ref 95–110)
CHLORIDE SERPL-SCNC: 99 MMOL/L (ref 95–110)
CHLORIDE SERPL-SCNC: 99 MMOL/L (ref 95–110)
CHOLEST SERPL-MCNC: 106 MG/DL (ref 120–199)
CHOLEST/HDLC SERPL: 8.2 {RATIO} (ref 2–5)
CLARITY UR REFRACT.AUTO: CLEAR
CLARITY UR: CLEAR
CO2 SERPL-SCNC: 21 MMOL/L (ref 23–29)
CO2 SERPL-SCNC: 21 MMOL/L (ref 23–29)
CO2 SERPL-SCNC: 22 MMOL/L (ref 23–29)
CO2 SERPL-SCNC: 24 MMOL/L (ref 23–29)
CO2 SERPL-SCNC: 25 MMOL/L (ref 23–29)
CO2 SERPL-SCNC: 26 MMOL/L (ref 23–29)
CO2 SERPL-SCNC: 27 MMOL/L (ref 23–29)
CO2 SERPL-SCNC: 28 MMOL/L (ref 23–29)
CO2 SERPL-SCNC: 29 MMOL/L (ref 23–29)
CO2 SERPL-SCNC: 30 MMOL/L (ref 23–29)
CO2 SERPL-SCNC: 31 MMOL/L (ref 23–29)
CO2 SERPL-SCNC: 32 MMOL/L (ref 23–29)
CO2 SERPL-SCNC: 33 MMOL/L (ref 23–29)
CO2 SERPL-SCNC: 34 MMOL/L (ref 23–29)
CO2 SERPL-SCNC: 35 MMOL/L (ref 23–29)
CO2 SERPL-SCNC: 35 MMOL/L (ref 23–29)
COLOR UR AUTO: ABNORMAL
COLOR UR: YELLOW
CORTIS SERPL-MCNC: 11.6 UG/DL (ref 4.3–22.4)
CORTIS SERPL-MCNC: 13.2 UG/DL
CORTIS SERPL-MCNC: 4.9 UG/DL (ref 4.3–22.4)
CREAT FLD-MCNC: 0.5 MG/DL
CREAT FLD-MCNC: 0.5 MG/DL
CREAT FLD-MCNC: 0.6 MG/DL
CREAT SERPL-MCNC: 0.5 MG/DL (ref 0.5–1.4)
CREAT SERPL-MCNC: 0.6 MG/DL (ref 0.5–1.4)
CREAT SERPL-MCNC: 0.7 MG/DL (ref 0.5–1.4)
CREAT SERPL-MCNC: 0.8 MG/DL (ref 0.5–1.4)
CREAT SERPL-MCNC: 0.9 MG/DL (ref 0.5–1.4)
CREAT SERPL-MCNC: 0.9 MG/DL (ref 0.5–1.4)
CREAT SERPL-MCNC: 1.1 MG/DL (ref 0.5–1.4)
CREAT UR-MCNC: 41 MG/DL (ref 15–325)
CRP SERPL-MCNC: 17.2 MG/L (ref 0–8.2)
CRP SERPL-MCNC: 23.3 MG/L (ref 0–8.2)
CRP SERPL-MCNC: 35 MG/L (ref 0–8.2)
CRP SERPL-MCNC: 49.6 MG/L (ref 0–8.2)
CRP SERPL-MCNC: 50.2 MG/L (ref 0–8.2)
CRP SERPL-MCNC: 69.1 MG/L (ref 0–8.2)
CRP SERPL-MCNC: 81.5 MG/L (ref 0–8.2)
CTP QC/QA: YES
CV ECHO LV RWT: 0.2 CM
CV ECHO LV RWT: 0.21 CM
CV ECHO LV RWT: 0.24 CM
CV ECHO LV RWT: 0.25 CM
D DIMER PPP IA.FEU-MCNC: 9.17 MG/L FEU
DACRYOCYTES BLD QL SMEAR: ABNORMAL
DIFFERENTIAL METHOD: ABNORMAL
DOP CALC AO PEAK VEL: 1.34 M/S
DOP CALC AO PEAK VEL: 1.36 M/S
DOP CALC AO PEAK VEL: 1.63 M/S
DOP CALC AO PEAK VEL: 1.63 M/S
DOP CALC AO VTI: 21.57 CM
DOP CALC AO VTI: 22.75 CM
DOP CALC AO VTI: 26.98 CM
DOP CALC AO VTI: 33.55 CM
DOP CALC LVOT AREA: 2.4 CM2
DOP CALC LVOT AREA: 2.7 CM2
DOP CALC LVOT AREA: 3.4 CM2
DOP CALC LVOT AREA: 3.4 CM2
DOP CALC LVOT DIAMETER: 1.76 CM
DOP CALC LVOT DIAMETER: 1.86 CM
DOP CALC LVOT DIAMETER: 2.08 CM
DOP CALC LVOT DIAMETER: 2.09 CM
DOP CALC LVOT PEAK VEL: 1.02 M/S
DOP CALC LVOT PEAK VEL: 1.09 M/S
DOP CALC LVOT PEAK VEL: 1.09 M/S
DOP CALC LVOT PEAK VEL: 1.16 M/S
DOP CALC LVOT STROKE VOLUME: 35.55 CM3
DOP CALC LVOT STROKE VOLUME: 41.66 CM3
DOP CALC LVOT STROKE VOLUME: 57.81 CM3
DOP CALC LVOT STROKE VOLUME: 59.43 CM3
DOP CALCLVOT PEAK VEL VTI: 14.62 CM
DOP CALCLVOT PEAK VEL VTI: 15.34 CM
DOP CALCLVOT PEAK VEL VTI: 16.86 CM
DOP CALCLVOT PEAK VEL VTI: 17.5 CM
E WAVE DECELERATION TIME: 114.74 MSEC
E WAVE DECELERATION TIME: 131.31 MSEC
E WAVE DECELERATION TIME: 159.98 MSEC
E WAVE DECELERATION TIME: 173.92 MSEC
E/A RATIO: 1.32
E/A RATIO: 1.45
E/A RATIO: 1.48
E/A RATIO: 2.78
E/E' RATIO: 20.5 M/S
E/E' RATIO: 22 M/S
E/E' RATIO: 22 M/S
E/E' RATIO: 41.2 M/S
ECHO LV POSTERIOR WALL: 0.66 CM (ref 0.6–1.1)
ECHO LV POSTERIOR WALL: 0.77 CM (ref 0.6–1.1)
ECHO LV POSTERIOR WALL: 0.82 CM (ref 0.6–1.1)
ECHO LV POSTERIOR WALL: 0.87 CM (ref 0.6–1.1)
EJECTION FRACTION: 10 %
EJECTION FRACTION: 10 %
EJECTION FRACTION: 20 %
EJECTION FRACTION: 20 %
EOSINOPHIL # BLD AUTO: 0 K/UL (ref 0–0.5)
EOSINOPHIL # BLD AUTO: 0.1 K/UL (ref 0–0.5)
EOSINOPHIL # BLD AUTO: 0.1 K/UL (ref 0–0.5)
EOSINOPHIL # BLD AUTO: ABNORMAL K/UL (ref 0–0.5)
EOSINOPHIL NFR BLD: 0 % (ref 0–8)
EOSINOPHIL NFR BLD: 0.1 % (ref 0–8)
EOSINOPHIL NFR BLD: 0.2 % (ref 0–8)
EOSINOPHIL NFR BLD: 0.3 % (ref 0–8)
EOSINOPHIL NFR BLD: 0.4 % (ref 0–8)
EOSINOPHIL NFR BLD: 0.4 % (ref 0–8)
EOSINOPHIL NFR BLD: 0.5 % (ref 0–8)
EOSINOPHIL NFR BLD: 0.6 % (ref 0–8)
EOSINOPHIL NFR BLD: 0.8 % (ref 0–8)
EOSINOPHIL NFR BLD: 1 % (ref 0–8)
EOSINOPHIL NFR BLD: 1 % (ref 0–8)
EOSINOPHIL NFR BLD: 1.7 % (ref 0–8)
EOSINOPHIL NFR BLD: 1.9 % (ref 0–8)
ERYTHROCYTE [DISTWIDTH] IN BLOOD BY AUTOMATED COUNT: 17.9 % (ref 11.5–14.5)
ERYTHROCYTE [DISTWIDTH] IN BLOOD BY AUTOMATED COUNT: 18 % (ref 11.5–14.5)
ERYTHROCYTE [DISTWIDTH] IN BLOOD BY AUTOMATED COUNT: 18.2 % (ref 11.5–14.5)
ERYTHROCYTE [DISTWIDTH] IN BLOOD BY AUTOMATED COUNT: 18.4 % (ref 11.5–14.5)
ERYTHROCYTE [DISTWIDTH] IN BLOOD BY AUTOMATED COUNT: 19 % (ref 11.5–14.5)
ERYTHROCYTE [DISTWIDTH] IN BLOOD BY AUTOMATED COUNT: 19.5 % (ref 11.5–14.5)
ERYTHROCYTE [DISTWIDTH] IN BLOOD BY AUTOMATED COUNT: 19.6 % (ref 11.5–14.5)
ERYTHROCYTE [DISTWIDTH] IN BLOOD BY AUTOMATED COUNT: 19.7 % (ref 11.5–14.5)
ERYTHROCYTE [DISTWIDTH] IN BLOOD BY AUTOMATED COUNT: 19.9 % (ref 11.5–14.5)
ERYTHROCYTE [DISTWIDTH] IN BLOOD BY AUTOMATED COUNT: 20.1 % (ref 11.5–14.5)
ERYTHROCYTE [DISTWIDTH] IN BLOOD BY AUTOMATED COUNT: 20.4 % (ref 11.5–14.5)
ERYTHROCYTE [DISTWIDTH] IN BLOOD BY AUTOMATED COUNT: 20.6 % (ref 11.5–14.5)
ERYTHROCYTE [DISTWIDTH] IN BLOOD BY AUTOMATED COUNT: 20.6 % (ref 11.5–14.5)
ERYTHROCYTE [DISTWIDTH] IN BLOOD BY AUTOMATED COUNT: 20.8 % (ref 11.5–14.5)
ERYTHROCYTE [DISTWIDTH] IN BLOOD BY AUTOMATED COUNT: 21 % (ref 11.5–14.5)
ERYTHROCYTE [DISTWIDTH] IN BLOOD BY AUTOMATED COUNT: 21.1 % (ref 11.5–14.5)
ERYTHROCYTE [DISTWIDTH] IN BLOOD BY AUTOMATED COUNT: 21.1 % (ref 11.5–14.5)
ERYTHROCYTE [DISTWIDTH] IN BLOOD BY AUTOMATED COUNT: 21.2 % (ref 11.5–14.5)
ERYTHROCYTE [DISTWIDTH] IN BLOOD BY AUTOMATED COUNT: 21.3 % (ref 11.5–14.5)
ERYTHROCYTE [DISTWIDTH] IN BLOOD BY AUTOMATED COUNT: 21.3 % (ref 11.5–14.5)
ERYTHROCYTE [DISTWIDTH] IN BLOOD BY AUTOMATED COUNT: 21.4 % (ref 11.5–14.5)
ERYTHROCYTE [DISTWIDTH] IN BLOOD BY AUTOMATED COUNT: 21.4 % (ref 11.5–14.5)
ERYTHROCYTE [DISTWIDTH] IN BLOOD BY AUTOMATED COUNT: 21.5 % (ref 11.5–14.5)
ERYTHROCYTE [DISTWIDTH] IN BLOOD BY AUTOMATED COUNT: 21.6 % (ref 11.5–14.5)
ERYTHROCYTE [DISTWIDTH] IN BLOOD BY AUTOMATED COUNT: 21.7 % (ref 11.5–14.5)
ERYTHROCYTE [DISTWIDTH] IN BLOOD BY AUTOMATED COUNT: 21.8 % (ref 11.5–14.5)
ERYTHROCYTE [DISTWIDTH] IN BLOOD BY AUTOMATED COUNT: 21.9 % (ref 11.5–14.5)
ERYTHROCYTE [DISTWIDTH] IN BLOOD BY AUTOMATED COUNT: 22.6 % (ref 11.5–14.5)
EST. GFR  (AFRICAN AMERICAN): >60 ML/MIN/1.73 M^2
EST. GFR  (NO RACE VARIABLE): 56 ML/MIN/1.73 M^2
EST. GFR  (NO RACE VARIABLE): >60 ML/MIN/1.73 M^2
EST. GFR  (NON AFRICAN AMERICAN): >60 ML/MIN/1.73 M^2
ESTIMATED AVG GLUCOSE: 94 MG/DL (ref 68–131)
ESTIMATED AVG GLUCOSE: 94 MG/DL (ref 68–131)
ESTIMATED AVG GLUCOSE: 97 MG/DL (ref 68–131)
ESTIMATED AVG GLUCOSE: 97 MG/DL (ref 68–131)
FERRITIN SERPL-MCNC: 438 NG/ML (ref 20–300)
FINAL PATHOLOGIC DIAGNOSIS: NORMAL
FOLATE SERPL-MCNC: 12.1 NG/ML (ref 4–24)
FRACTIONAL SHORTENING: 4 % (ref 28–44)
FRACTIONAL SHORTENING: 5 % (ref 28–44)
FRACTIONAL SHORTENING: 7 % (ref 28–44)
FRACTIONAL SHORTENING: 9 % (ref 28–44)
GIANT PLATELETS BLD QL SMEAR: PRESENT
GLUCOSE SERPL-MCNC: 100 MG/DL (ref 70–110)
GLUCOSE SERPL-MCNC: 100 MG/DL (ref 70–110)
GLUCOSE SERPL-MCNC: 101 MG/DL (ref 70–110)
GLUCOSE SERPL-MCNC: 102 MG/DL (ref 70–110)
GLUCOSE SERPL-MCNC: 103 MG/DL (ref 70–110)
GLUCOSE SERPL-MCNC: 103 MG/DL (ref 70–110)
GLUCOSE SERPL-MCNC: 105 MG/DL (ref 70–110)
GLUCOSE SERPL-MCNC: 106 MG/DL (ref 70–110)
GLUCOSE SERPL-MCNC: 106 MG/DL (ref 70–110)
GLUCOSE SERPL-MCNC: 107 MG/DL (ref 70–110)
GLUCOSE SERPL-MCNC: 108 MG/DL (ref 70–110)
GLUCOSE SERPL-MCNC: 108 MG/DL (ref 70–110)
GLUCOSE SERPL-MCNC: 109 MG/DL (ref 70–110)
GLUCOSE SERPL-MCNC: 110 MG/DL (ref 70–110)
GLUCOSE SERPL-MCNC: 111 MG/DL (ref 70–110)
GLUCOSE SERPL-MCNC: 112 MG/DL (ref 70–110)
GLUCOSE SERPL-MCNC: 113 MG/DL (ref 70–110)
GLUCOSE SERPL-MCNC: 113 MG/DL (ref 70–110)
GLUCOSE SERPL-MCNC: 116 MG/DL (ref 70–110)
GLUCOSE SERPL-MCNC: 117 MG/DL (ref 70–110)
GLUCOSE SERPL-MCNC: 117 MG/DL (ref 70–110)
GLUCOSE SERPL-MCNC: 118 MG/DL (ref 70–110)
GLUCOSE SERPL-MCNC: 118 MG/DL (ref 70–110)
GLUCOSE SERPL-MCNC: 119 MG/DL (ref 70–110)
GLUCOSE SERPL-MCNC: 120 MG/DL (ref 70–110)
GLUCOSE SERPL-MCNC: 121 MG/DL (ref 70–110)
GLUCOSE SERPL-MCNC: 121 MG/DL (ref 70–110)
GLUCOSE SERPL-MCNC: 122 MG/DL (ref 70–110)
GLUCOSE SERPL-MCNC: 122 MG/DL (ref 70–110)
GLUCOSE SERPL-MCNC: 123 MG/DL (ref 70–110)
GLUCOSE SERPL-MCNC: 123 MG/DL (ref 70–110)
GLUCOSE SERPL-MCNC: 125 MG/DL (ref 70–110)
GLUCOSE SERPL-MCNC: 126 MG/DL (ref 70–110)
GLUCOSE SERPL-MCNC: 127 MG/DL (ref 70–110)
GLUCOSE SERPL-MCNC: 130 MG/DL (ref 70–110)
GLUCOSE SERPL-MCNC: 131 MG/DL (ref 70–110)
GLUCOSE SERPL-MCNC: 132 MG/DL (ref 70–110)
GLUCOSE SERPL-MCNC: 134 MG/DL (ref 70–110)
GLUCOSE SERPL-MCNC: 139 MG/DL (ref 70–110)
GLUCOSE SERPL-MCNC: 141 MG/DL (ref 70–110)
GLUCOSE SERPL-MCNC: 142 MG/DL (ref 70–110)
GLUCOSE SERPL-MCNC: 147 MG/DL (ref 70–110)
GLUCOSE SERPL-MCNC: 148 MG/DL (ref 70–110)
GLUCOSE SERPL-MCNC: 151 MG/DL (ref 70–110)
GLUCOSE SERPL-MCNC: 173 MG/DL (ref 70–110)
GLUCOSE SERPL-MCNC: 76 MG/DL (ref 70–110)
GLUCOSE SERPL-MCNC: 78 MG/DL (ref 70–110)
GLUCOSE SERPL-MCNC: 79 MG/DL (ref 70–110)
GLUCOSE SERPL-MCNC: 82 MG/DL (ref 70–110)
GLUCOSE SERPL-MCNC: 83 MG/DL (ref 70–110)
GLUCOSE SERPL-MCNC: 84 MG/DL (ref 70–110)
GLUCOSE SERPL-MCNC: 85 MG/DL (ref 70–110)
GLUCOSE SERPL-MCNC: 85 MG/DL (ref 70–110)
GLUCOSE SERPL-MCNC: 88 MG/DL (ref 70–110)
GLUCOSE SERPL-MCNC: 89 MG/DL (ref 70–110)
GLUCOSE SERPL-MCNC: 89 MG/DL (ref 70–110)
GLUCOSE SERPL-MCNC: 91 MG/DL (ref 70–110)
GLUCOSE SERPL-MCNC: 92 MG/DL (ref 70–110)
GLUCOSE SERPL-MCNC: 92 MG/DL (ref 70–110)
GLUCOSE SERPL-MCNC: 93 MG/DL (ref 70–110)
GLUCOSE SERPL-MCNC: 93 MG/DL (ref 70–110)
GLUCOSE SERPL-MCNC: 94 MG/DL (ref 70–110)
GLUCOSE SERPL-MCNC: 95 MG/DL (ref 70–110)
GLUCOSE SERPL-MCNC: 95 MG/DL (ref 70–110)
GLUCOSE SERPL-MCNC: 96 MG/DL (ref 70–110)
GLUCOSE SERPL-MCNC: 96 MG/DL (ref 70–110)
GLUCOSE SERPL-MCNC: 97 MG/DL (ref 70–110)
GLUCOSE SERPL-MCNC: 98 MG/DL (ref 70–110)
GLUCOSE SERPL-MCNC: 98 MG/DL (ref 70–110)
GLUCOSE SERPL-MCNC: 99 MG/DL (ref 70–110)
GLUCOSE SERPL-MCNC: 99 MG/DL (ref 70–110)
GLUCOSE UR QL STRIP: NEGATIVE
HBA1C MFR BLD: 4.9 % (ref 4–5.6)
HBA1C MFR BLD: 4.9 % (ref 4–5.6)
HBA1C MFR BLD: 5 % (ref 4–5.6)
HBA1C MFR BLD: 5 % (ref 4–5.6)
HCT VFR BLD AUTO: 26.7 % (ref 37–48.5)
HCT VFR BLD AUTO: 29 % (ref 37–48.5)
HCT VFR BLD AUTO: 29.2 % (ref 37–48.5)
HCT VFR BLD AUTO: 29.6 % (ref 37–48.5)
HCT VFR BLD AUTO: 29.9 % (ref 37–48.5)
HCT VFR BLD AUTO: 30 % (ref 37–48.5)
HCT VFR BLD AUTO: 30.4 % (ref 37–48.5)
HCT VFR BLD AUTO: 30.5 % (ref 37–48.5)
HCT VFR BLD AUTO: 30.8 % (ref 37–48.5)
HCT VFR BLD AUTO: 30.9 % (ref 37–48.5)
HCT VFR BLD AUTO: 31.1 % (ref 37–48.5)
HCT VFR BLD AUTO: 31.4 % (ref 37–48.5)
HCT VFR BLD AUTO: 31.4 % (ref 37–48.5)
HCT VFR BLD AUTO: 31.9 % (ref 37–48.5)
HCT VFR BLD AUTO: 32.2 % (ref 37–48.5)
HCT VFR BLD AUTO: 32.3 % (ref 37–48.5)
HCT VFR BLD AUTO: 32.8 % (ref 37–48.5)
HCT VFR BLD AUTO: 33 % (ref 37–48.5)
HCT VFR BLD AUTO: 33.1 % (ref 37–48.5)
HCT VFR BLD AUTO: 33.2 % (ref 37–48.5)
HCT VFR BLD AUTO: 33.3 % (ref 37–48.5)
HCT VFR BLD AUTO: 33.4 % (ref 37–48.5)
HCT VFR BLD AUTO: 33.7 % (ref 37–48.5)
HCT VFR BLD AUTO: 34.2 % (ref 37–48.5)
HCT VFR BLD AUTO: 34.6 % (ref 37–48.5)
HCT VFR BLD AUTO: 35.1 % (ref 37–48.5)
HCT VFR BLD AUTO: 35.4 % (ref 37–48.5)
HCT VFR BLD AUTO: 36.4 % (ref 37–48.5)
HCT VFR BLD AUTO: 36.6 % (ref 37–48.5)
HCT VFR BLD AUTO: 36.7 % (ref 37–48.5)
HCV AB SERPL QL IA: NEGATIVE
HDLC SERPL-MCNC: 13 MG/DL (ref 40–75)
HDLC SERPL: 12.3 % (ref 20–50)
HGB BLD-MCNC: 10 G/DL (ref 12–16)
HGB BLD-MCNC: 10.1 G/DL (ref 12–16)
HGB BLD-MCNC: 10.1 G/DL (ref 12–16)
HGB BLD-MCNC: 10.2 G/DL (ref 12–16)
HGB BLD-MCNC: 10.2 G/DL (ref 12–16)
HGB BLD-MCNC: 10.3 G/DL (ref 12–16)
HGB BLD-MCNC: 10.3 G/DL (ref 12–16)
HGB BLD-MCNC: 10.4 G/DL (ref 12–16)
HGB BLD-MCNC: 10.4 G/DL (ref 12–16)
HGB BLD-MCNC: 10.5 G/DL (ref 12–16)
HGB BLD-MCNC: 10.5 G/DL (ref 12–16)
HGB BLD-MCNC: 10.7 G/DL (ref 12–16)
HGB BLD-MCNC: 10.7 G/DL (ref 12–16)
HGB BLD-MCNC: 10.8 G/DL (ref 12–16)
HGB BLD-MCNC: 10.9 G/DL (ref 12–16)
HGB BLD-MCNC: 11 G/DL (ref 12–16)
HGB BLD-MCNC: 11.1 G/DL (ref 12–16)
HGB BLD-MCNC: 11.2 G/DL (ref 12–16)
HGB BLD-MCNC: 11.3 G/DL (ref 12–16)
HGB BLD-MCNC: 11.6 G/DL (ref 12–16)
HGB BLD-MCNC: 8.7 G/DL (ref 12–16)
HGB BLD-MCNC: 9.1 G/DL (ref 12–16)
HGB BLD-MCNC: 9.2 G/DL (ref 12–16)
HGB BLD-MCNC: 9.5 G/DL (ref 12–16)
HGB BLD-MCNC: 9.5 G/DL (ref 12–16)
HGB BLD-MCNC: 9.6 G/DL (ref 12–16)
HGB BLD-MCNC: 9.6 G/DL (ref 12–16)
HGB BLD-MCNC: 9.8 G/DL (ref 12–16)
HGB BLD-MCNC: 9.9 G/DL (ref 12–16)
HGB UR QL STRIP: ABNORMAL
HGB UR QL STRIP: NEGATIVE
HYALINE CASTS UR QL AUTO: 0 /LPF
HYPOCHROMIA BLD QL SMEAR: ABNORMAL
IMM GRANULOCYTES # BLD AUTO: 0 K/UL (ref 0–0.04)
IMM GRANULOCYTES # BLD AUTO: 0.01 K/UL (ref 0–0.04)
IMM GRANULOCYTES # BLD AUTO: 0.02 K/UL (ref 0–0.04)
IMM GRANULOCYTES # BLD AUTO: 0.03 K/UL (ref 0–0.04)
IMM GRANULOCYTES # BLD AUTO: 0.04 K/UL (ref 0–0.04)
IMM GRANULOCYTES # BLD AUTO: 0.06 K/UL (ref 0–0.04)
IMM GRANULOCYTES # BLD AUTO: ABNORMAL K/UL (ref 0–0.04)
IMM GRANULOCYTES NFR BLD AUTO: 0 % (ref 0–0.5)
IMM GRANULOCYTES NFR BLD AUTO: 0.2 % (ref 0–0.5)
IMM GRANULOCYTES NFR BLD AUTO: 0.3 % (ref 0–0.5)
IMM GRANULOCYTES NFR BLD AUTO: 0.4 % (ref 0–0.5)
IMM GRANULOCYTES NFR BLD AUTO: 0.5 % (ref 0–0.5)
IMM GRANULOCYTES NFR BLD AUTO: 0.9 % (ref 0–0.5)
IMM GRANULOCYTES NFR BLD AUTO: ABNORMAL % (ref 0–0.5)
INR PPP: 1.2 (ref 0.8–1.2)
INTERVENTRICULAR SEPTUM: 0.76 CM (ref 0.6–1.1)
INTERVENTRICULAR SEPTUM: 0.79 CM (ref 0.6–1.1)
INTERVENTRICULAR SEPTUM: 0.81 CM (ref 0.6–1.1)
INTERVENTRICULAR SEPTUM: 0.85 CM (ref 0.6–1.1)
IRON SERPL-MCNC: 22 UG/DL (ref 30–160)
IVRT: 108.47 MSEC
KETONES UR QL STRIP: ABNORMAL
KETONES UR QL STRIP: NEGATIVE
LA MAJOR: 5.95 CM
LA MAJOR: 6.19 CM
LA MAJOR: 6.52 CM
LA MAJOR: 6.57 CM
LA MINOR: 5.93 CM
LA MINOR: 5.96 CM
LA MINOR: 6.19 CM
LA MINOR: 6.43 CM
LA WIDTH: 4.34 CM
LA WIDTH: 4.35 CM
LA WIDTH: 4.56 CM
LA WIDTH: 5.38 CM
LACTATE SERPL-SCNC: 0.9 MMOL/L (ref 0.5–2.2)
LACTATE SERPL-SCNC: 1.4 MMOL/L (ref 0.5–2.2)
LACTATE SERPL-SCNC: 1.5 MMOL/L (ref 0.5–2.2)
LACTATE SERPL-SCNC: 1.6 MMOL/L (ref 0.5–2.2)
LACTATE SERPL-SCNC: 2.1 MMOL/L (ref 0.5–2.2)
LACTATE SERPL-SCNC: 3.1 MMOL/L (ref 0.5–2.2)
LDLC SERPL CALC-MCNC: 70.6 MG/DL (ref 63–159)
LEFT ATRIUM SIZE: 4.5 CM
LEFT ATRIUM SIZE: 5.19 CM
LEFT ATRIUM SIZE: 5.35 CM
LEFT ATRIUM SIZE: 5.46 CM
LEFT ATRIUM VOLUME INDEX MOD: 46.1 ML/M2
LEFT ATRIUM VOLUME INDEX MOD: 80.9 ML/M2
LEFT ATRIUM VOLUME INDEX MOD: 84.4 ML/M2
LEFT ATRIUM VOLUME INDEX: 73.5 ML/M2
LEFT ATRIUM VOLUME INDEX: 79.9 ML/M2
LEFT ATRIUM VOLUME INDEX: 93.5 ML/M2
LEFT ATRIUM VOLUME MOD: 127 CM3
LEFT ATRIUM VOLUME MOD: 136.75 CM3
LEFT ATRIUM VOLUME MOD: 71.41 CM3
LEFT ATRIUM VOLUME MOD: 85.48 CM3
LEFT ATRIUM VOLUME: 113.36 CM3
LEFT ATRIUM VOLUME: 113.99 CM3
LEFT ATRIUM VOLUME: 125.43 CM3
LEFT ATRIUM VOLUME: 151.44 CM3
LEFT INTERNAL DIMENSION IN SYSTOLE: 5.85 CM (ref 2.1–4)
LEFT INTERNAL DIMENSION IN SYSTOLE: 6.42 CM (ref 2.1–4)
LEFT INTERNAL DIMENSION IN SYSTOLE: 6.65 CM (ref 2.1–4)
LEFT INTERNAL DIMENSION IN SYSTOLE: 7.28 CM (ref 2.1–4)
LEFT VENTRICLE DIASTOLIC VOLUME INDEX: 127.75 ML/M2
LEFT VENTRICLE DIASTOLIC VOLUME INDEX: 166.87 ML/M2
LEFT VENTRICLE DIASTOLIC VOLUME INDEX: 193.1 ML/M2
LEFT VENTRICLE DIASTOLIC VOLUME: 198.02 ML
LEFT VENTRICLE DIASTOLIC VOLUME: 251.7 ML
LEFT VENTRICLE DIASTOLIC VOLUME: 261.99 ML
LEFT VENTRICLE DIASTOLIC VOLUME: 312.82 ML
LEFT VENTRICLE MASS INDEX: 115 G/M2
LEFT VENTRICLE MASS INDEX: 173 G/M2
LEFT VENTRICLE MASS INDEX: 175 G/M2
LEFT VENTRICLE SYSTOLIC VOLUME INDEX: 109.4 ML/M2
LEFT VENTRICLE SYSTOLIC VOLUME INDEX: 133.6 ML/M2
LEFT VENTRICLE SYSTOLIC VOLUME INDEX: 172.3 ML/M2
LEFT VENTRICLE SYSTOLIC VOLUME: 169.54 ML
LEFT VENTRICLE SYSTOLIC VOLUME: 209.76 ML
LEFT VENTRICLE SYSTOLIC VOLUME: 227.6 ML
LEFT VENTRICLE SYSTOLIC VOLUME: 279.18 ML
LEFT VENTRICULAR INTERNAL DIMENSION IN DIASTOLE: 6.26 CM (ref 3.5–6)
LEFT VENTRICULAR INTERNAL DIMENSION IN DIASTOLE: 6.95 CM (ref 3.5–6)
LEFT VENTRICULAR INTERNAL DIMENSION IN DIASTOLE: 7.08 CM (ref 3.5–6)
LEFT VENTRICULAR INTERNAL DIMENSION IN DIASTOLE: 7.66 CM (ref 3.5–6)
LEFT VENTRICULAR MASS: 177.82 G
LEFT VENTRICULAR MASS: 237.68 G
LEFT VENTRICULAR MASS: 272.31 G
LEFT VENTRICULAR MASS: 283.01 G
LEUKOCYTE ESTERASE UR QL STRIP: ABNORMAL
LEUKOCYTE ESTERASE UR QL STRIP: ABNORMAL
LEUKOCYTE ESTERASE UR QL STRIP: NEGATIVE
LEUKOCYTE ESTERASE UR QL STRIP: NEGATIVE
LIPASE SERPL-CCNC: 5 U/L (ref 4–60)
LV LATERAL E/E' RATIO: 19.25 M/S
LV LATERAL E/E' RATIO: 25.67 M/S
LV LATERAL E/E' RATIO: 27.33 M/S
LV LATERAL E/E' RATIO: 34.33 M/S
LV SEPTAL E/E' RATIO: 16.4 M/S
LV SEPTAL E/E' RATIO: 19.25 M/S
LV SEPTAL E/E' RATIO: 25.67 M/S
LV SEPTAL E/E' RATIO: 51.5 M/S
LYMPHOCYTES # BLD AUTO: 1.1 K/UL (ref 1–4.8)
LYMPHOCYTES # BLD AUTO: 1.3 K/UL (ref 1–4.8)
LYMPHOCYTES # BLD AUTO: 1.4 K/UL (ref 1–4.8)
LYMPHOCYTES # BLD AUTO: 1.4 K/UL (ref 1–4.8)
LYMPHOCYTES # BLD AUTO: 1.5 K/UL (ref 1–4.8)
LYMPHOCYTES # BLD AUTO: 1.5 K/UL (ref 1–4.8)
LYMPHOCYTES # BLD AUTO: 1.6 K/UL (ref 1–4.8)
LYMPHOCYTES # BLD AUTO: 1.7 K/UL (ref 1–4.8)
LYMPHOCYTES # BLD AUTO: 1.7 K/UL (ref 1–4.8)
LYMPHOCYTES # BLD AUTO: 2 K/UL (ref 1–4.8)
LYMPHOCYTES # BLD AUTO: 2 K/UL (ref 1–4.8)
LYMPHOCYTES # BLD AUTO: 2.1 K/UL (ref 1–4.8)
LYMPHOCYTES # BLD AUTO: 2.2 K/UL (ref 1–4.8)
LYMPHOCYTES # BLD AUTO: 2.3 K/UL (ref 1–4.8)
LYMPHOCYTES # BLD AUTO: 2.4 K/UL (ref 1–4.8)
LYMPHOCYTES # BLD AUTO: 2.6 K/UL (ref 1–4.8)
LYMPHOCYTES # BLD AUTO: 2.6 K/UL (ref 1–4.8)
LYMPHOCYTES # BLD AUTO: ABNORMAL K/UL (ref 1–4.8)
LYMPHOCYTES NFR BLD: 21.7 % (ref 18–48)
LYMPHOCYTES NFR BLD: 23 % (ref 18–48)
LYMPHOCYTES NFR BLD: 29.6 % (ref 18–48)
LYMPHOCYTES NFR BLD: 32.4 % (ref 18–48)
LYMPHOCYTES NFR BLD: 34.6 % (ref 18–48)
LYMPHOCYTES NFR BLD: 36.7 % (ref 18–48)
LYMPHOCYTES NFR BLD: 38.5 % (ref 18–48)
LYMPHOCYTES NFR BLD: 39.6 % (ref 18–48)
LYMPHOCYTES NFR BLD: 40.4 % (ref 18–48)
LYMPHOCYTES NFR BLD: 40.5 % (ref 18–48)
LYMPHOCYTES NFR BLD: 44 % (ref 18–48)
LYMPHOCYTES NFR BLD: 44.4 % (ref 18–48)
LYMPHOCYTES NFR BLD: 48.7 % (ref 18–48)
LYMPHOCYTES NFR BLD: 48.8 % (ref 18–48)
LYMPHOCYTES NFR BLD: 49.3 % (ref 18–48)
LYMPHOCYTES NFR BLD: 50.5 % (ref 18–48)
LYMPHOCYTES NFR BLD: 53.1 % (ref 18–48)
LYMPHOCYTES NFR BLD: 53.6 % (ref 18–48)
LYMPHOCYTES NFR BLD: 54 % (ref 18–48)
LYMPHOCYTES NFR BLD: 55 % (ref 18–48)
LYMPHOCYTES NFR BLD: 56.1 % (ref 18–48)
LYMPHOCYTES NFR BLD: 57.4 % (ref 18–48)
LYMPHOCYTES NFR BLD: 59 % (ref 18–48)
LYMPHOCYTES NFR BLD: 62.9 % (ref 18–48)
LYMPHOCYTES NFR BLD: 64 % (ref 18–48)
LYMPHOCYTES NFR BLD: 64.8 % (ref 18–48)
LYMPHOCYTES NFR BLD: 72.3 % (ref 18–48)
LYMPHOCYTES NFR BLD: 72.3 % (ref 18–48)
LYMPHOCYTES NFR BLD: 74 % (ref 18–48)
LYMPHOCYTES NFR BLD: 75 % (ref 18–48)
Lab: NORMAL
MAGNESIUM SERPL-MCNC: 1.3 MG/DL (ref 1.6–2.6)
MAGNESIUM SERPL-MCNC: 1.4 MG/DL (ref 1.6–2.6)
MAGNESIUM SERPL-MCNC: 1.4 MG/DL (ref 1.6–2.6)
MAGNESIUM SERPL-MCNC: 1.5 MG/DL (ref 1.6–2.6)
MAGNESIUM SERPL-MCNC: 1.6 MG/DL (ref 1.6–2.6)
MAGNESIUM SERPL-MCNC: 1.7 MG/DL (ref 1.6–2.6)
MAGNESIUM SERPL-MCNC: 1.8 MG/DL (ref 1.6–2.6)
MAGNESIUM SERPL-MCNC: 1.9 MG/DL (ref 1.6–2.6)
MAGNESIUM SERPL-MCNC: 2 MG/DL (ref 1.6–2.6)
MAGNESIUM SERPL-MCNC: 2.1 MG/DL (ref 1.6–2.6)
MCH RBC QN AUTO: 25.2 PG (ref 27–31)
MCH RBC QN AUTO: 26.2 PG (ref 27–31)
MCH RBC QN AUTO: 26.3 PG (ref 27–31)
MCH RBC QN AUTO: 26.4 PG (ref 27–31)
MCH RBC QN AUTO: 26.4 PG (ref 27–31)
MCH RBC QN AUTO: 26.5 PG (ref 27–31)
MCH RBC QN AUTO: 26.6 PG (ref 27–31)
MCH RBC QN AUTO: 26.7 PG (ref 27–31)
MCH RBC QN AUTO: 26.8 PG (ref 27–31)
MCH RBC QN AUTO: 26.8 PG (ref 27–31)
MCH RBC QN AUTO: 26.9 PG (ref 27–31)
MCH RBC QN AUTO: 27 PG (ref 27–31)
MCH RBC QN AUTO: 27.5 PG (ref 27–31)
MCH RBC QN AUTO: 27.6 PG (ref 27–31)
MCH RBC QN AUTO: 27.7 PG (ref 27–31)
MCH RBC QN AUTO: 27.8 PG (ref 27–31)
MCH RBC QN AUTO: 28.3 PG (ref 27–31)
MCH RBC QN AUTO: 28.5 PG (ref 27–31)
MCH RBC QN AUTO: 28.7 PG (ref 27–31)
MCH RBC QN AUTO: 28.7 PG (ref 27–31)
MCH RBC QN AUTO: 29.3 PG (ref 27–31)
MCH RBC QN AUTO: 29.5 PG (ref 27–31)
MCHC RBC AUTO-ENTMCNC: 30.5 G/DL (ref 32–36)
MCHC RBC AUTO-ENTMCNC: 30.8 G/DL (ref 32–36)
MCHC RBC AUTO-ENTMCNC: 31 G/DL (ref 32–36)
MCHC RBC AUTO-ENTMCNC: 31 G/DL (ref 32–36)
MCHC RBC AUTO-ENTMCNC: 31.1 G/DL (ref 32–36)
MCHC RBC AUTO-ENTMCNC: 31.1 G/DL (ref 32–36)
MCHC RBC AUTO-ENTMCNC: 31.2 G/DL (ref 32–36)
MCHC RBC AUTO-ENTMCNC: 31.4 G/DL (ref 32–36)
MCHC RBC AUTO-ENTMCNC: 31.5 G/DL (ref 32–36)
MCHC RBC AUTO-ENTMCNC: 31.6 G/DL (ref 32–36)
MCHC RBC AUTO-ENTMCNC: 31.7 G/DL (ref 32–36)
MCHC RBC AUTO-ENTMCNC: 31.8 G/DL (ref 32–36)
MCHC RBC AUTO-ENTMCNC: 32 G/DL (ref 32–36)
MCHC RBC AUTO-ENTMCNC: 32 G/DL (ref 32–36)
MCHC RBC AUTO-ENTMCNC: 32.1 G/DL (ref 32–36)
MCHC RBC AUTO-ENTMCNC: 32.1 G/DL (ref 32–36)
MCHC RBC AUTO-ENTMCNC: 32.2 G/DL (ref 32–36)
MCHC RBC AUTO-ENTMCNC: 32.3 G/DL (ref 32–36)
MCHC RBC AUTO-ENTMCNC: 32.4 G/DL (ref 32–36)
MCHC RBC AUTO-ENTMCNC: 32.5 G/DL (ref 32–36)
MCHC RBC AUTO-ENTMCNC: 32.5 G/DL (ref 32–36)
MCHC RBC AUTO-ENTMCNC: 32.6 G/DL (ref 32–36)
MCHC RBC AUTO-ENTMCNC: 32.6 G/DL (ref 32–36)
MCHC RBC AUTO-ENTMCNC: 32.8 G/DL (ref 32–36)
MCHC RBC AUTO-ENTMCNC: 32.9 G/DL (ref 32–36)
MCHC RBC AUTO-ENTMCNC: 33.4 G/DL (ref 32–36)
MCV RBC AUTO: 80 FL (ref 82–98)
MCV RBC AUTO: 82 FL (ref 82–98)
MCV RBC AUTO: 83 FL (ref 82–98)
MCV RBC AUTO: 84 FL (ref 82–98)
MCV RBC AUTO: 84 FL (ref 82–98)
MCV RBC AUTO: 85 FL (ref 82–98)
MCV RBC AUTO: 86 FL (ref 82–98)
MCV RBC AUTO: 87 FL (ref 82–98)
MCV RBC AUTO: 88 FL (ref 82–98)
MCV RBC AUTO: 89 FL (ref 82–98)
MCV RBC AUTO: 90 FL (ref 82–98)
MCV RBC AUTO: 91 FL (ref 82–98)
MICROSCOPIC COMMENT: ABNORMAL
MONOCYTES # BLD AUTO: 0.3 K/UL (ref 0.3–1)
MONOCYTES # BLD AUTO: 0.4 K/UL (ref 0.3–1)
MONOCYTES # BLD AUTO: 0.5 K/UL (ref 0.3–1)
MONOCYTES # BLD AUTO: 0.6 K/UL (ref 0.3–1)
MONOCYTES # BLD AUTO: 0.6 K/UL (ref 0.3–1)
MONOCYTES # BLD AUTO: 0.7 K/UL (ref 0.3–1)
MONOCYTES # BLD AUTO: 0.7 K/UL (ref 0.3–1)
MONOCYTES # BLD AUTO: 0.8 K/UL (ref 0.3–1)
MONOCYTES # BLD AUTO: 0.9 K/UL (ref 0.3–1)
MONOCYTES # BLD AUTO: 1 K/UL (ref 0.3–1)
MONOCYTES # BLD AUTO: 1 K/UL (ref 0.3–1)
MONOCYTES # BLD AUTO: 1.1 K/UL (ref 0.3–1)
MONOCYTES # BLD AUTO: 1.1 K/UL (ref 0.3–1)
MONOCYTES # BLD AUTO: ABNORMAL K/UL (ref 0.3–1)
MONOCYTES NFR BLD: 10 % (ref 4–15)
MONOCYTES NFR BLD: 10.1 % (ref 4–15)
MONOCYTES NFR BLD: 10.4 % (ref 4–15)
MONOCYTES NFR BLD: 11 % (ref 4–15)
MONOCYTES NFR BLD: 12.7 % (ref 4–15)
MONOCYTES NFR BLD: 13.6 % (ref 4–15)
MONOCYTES NFR BLD: 13.8 % (ref 4–15)
MONOCYTES NFR BLD: 15 % (ref 4–15)
MONOCYTES NFR BLD: 15.5 % (ref 4–15)
MONOCYTES NFR BLD: 17.8 % (ref 4–15)
MONOCYTES NFR BLD: 20.1 % (ref 4–15)
MONOCYTES NFR BLD: 21 % (ref 4–15)
MONOCYTES NFR BLD: 21.3 % (ref 4–15)
MONOCYTES NFR BLD: 21.5 % (ref 4–15)
MONOCYTES NFR BLD: 23 % (ref 4–15)
MONOCYTES NFR BLD: 24.9 % (ref 4–15)
MONOCYTES NFR BLD: 24.9 % (ref 4–15)
MONOCYTES NFR BLD: 26.3 % (ref 4–15)
MONOCYTES NFR BLD: 3 % (ref 4–15)
MONOCYTES NFR BLD: 30.4 % (ref 4–15)
MONOCYTES NFR BLD: 31.5 % (ref 4–15)
MONOCYTES NFR BLD: 33.1 % (ref 4–15)
MONOCYTES NFR BLD: 36 % (ref 4–15)
MONOCYTES NFR BLD: 42.5 % (ref 4–15)
MONOCYTES NFR BLD: 6 % (ref 4–15)
MONOCYTES NFR BLD: 6.3 % (ref 4–15)
MONOCYTES NFR BLD: 7 % (ref 4–15)
MONOCYTES NFR BLD: 7.8 % (ref 4–15)
MONOCYTES NFR BLD: 8 % (ref 4–15)
MONOCYTES NFR BLD: 9.4 % (ref 4–15)
MV PEAK A VEL: 0.37 M/S
MV PEAK A VEL: 0.52 M/S
MV PEAK A VEL: 0.53 M/S
MV PEAK A VEL: 0.62 M/S
MV PEAK E VEL: 0.77 M/S
MV PEAK E VEL: 0.77 M/S
MV PEAK E VEL: 0.82 M/S
MV PEAK E VEL: 1.03 M/S
MV STENOSIS PRESSURE HALF TIME: 33.28 MS
MV STENOSIS PRESSURE HALF TIME: 38.08 MS
MV STENOSIS PRESSURE HALF TIME: 39.52 MS
MV STENOSIS PRESSURE HALF TIME: 50.44 MS
MV VALVE AREA P 1/2 METHOD: 4.36 CM2
MV VALVE AREA P 1/2 METHOD: 5.57 CM2
MV VALVE AREA P 1/2 METHOD: 5.78 CM2
MV VALVE AREA P 1/2 METHOD: 6.61 CM2
NEUTROPHILS # BLD AUTO: 0.1 K/UL (ref 1.8–7.7)
NEUTROPHILS # BLD AUTO: 0.2 K/UL (ref 1.8–7.7)
NEUTROPHILS # BLD AUTO: 0.4 K/UL (ref 1.8–7.7)
NEUTROPHILS # BLD AUTO: 0.4 K/UL (ref 1.8–7.7)
NEUTROPHILS # BLD AUTO: 0.5 K/UL (ref 1.8–7.7)
NEUTROPHILS # BLD AUTO: 0.7 K/UL (ref 1.8–7.7)
NEUTROPHILS # BLD AUTO: 0.8 K/UL (ref 1.8–7.7)
NEUTROPHILS # BLD AUTO: 0.9 K/UL (ref 1.8–7.7)
NEUTROPHILS # BLD AUTO: 1.1 K/UL (ref 1.8–7.7)
NEUTROPHILS # BLD AUTO: 1.2 K/UL (ref 1.8–7.7)
NEUTROPHILS # BLD AUTO: 1.6 K/UL (ref 1.8–7.7)
NEUTROPHILS # BLD AUTO: 2.4 K/UL (ref 1.8–7.7)
NEUTROPHILS # BLD AUTO: 2.5 K/UL (ref 1.8–7.7)
NEUTROPHILS # BLD AUTO: 2.6 K/UL (ref 1.8–7.7)
NEUTROPHILS # BLD AUTO: 2.7 K/UL (ref 1.8–7.7)
NEUTROPHILS # BLD AUTO: 2.8 K/UL (ref 1.8–7.7)
NEUTROPHILS # BLD AUTO: 3 K/UL (ref 1.8–7.7)
NEUTROPHILS # BLD AUTO: 3.1 K/UL (ref 1.8–7.7)
NEUTROPHILS # BLD AUTO: 3.9 K/UL (ref 1.8–7.7)
NEUTROPHILS # BLD AUTO: 5.1 K/UL (ref 1.8–7.7)
NEUTROPHILS NFR BLD: 11 % (ref 38–73)
NEUTROPHILS NFR BLD: 11.6 % (ref 38–73)
NEUTROPHILS NFR BLD: 14.4 % (ref 38–73)
NEUTROPHILS NFR BLD: 19.4 % (ref 38–73)
NEUTROPHILS NFR BLD: 2 % (ref 38–73)
NEUTROPHILS NFR BLD: 2.3 % (ref 38–73)
NEUTROPHILS NFR BLD: 20.2 % (ref 38–73)
NEUTROPHILS NFR BLD: 25 % (ref 38–73)
NEUTROPHILS NFR BLD: 25 % (ref 38–73)
NEUTROPHILS NFR BLD: 28.5 % (ref 38–73)
NEUTROPHILS NFR BLD: 3 % (ref 38–73)
NEUTROPHILS NFR BLD: 30 % (ref 38–73)
NEUTROPHILS NFR BLD: 30.9 % (ref 38–73)
NEUTROPHILS NFR BLD: 33 % (ref 38–73)
NEUTROPHILS NFR BLD: 34 % (ref 38–73)
NEUTROPHILS NFR BLD: 35.6 % (ref 38–73)
NEUTROPHILS NFR BLD: 37.8 % (ref 38–73)
NEUTROPHILS NFR BLD: 4 % (ref 38–73)
NEUTROPHILS NFR BLD: 4.8 % (ref 38–73)
NEUTROPHILS NFR BLD: 41.8 % (ref 38–73)
NEUTROPHILS NFR BLD: 47.2 % (ref 38–73)
NEUTROPHILS NFR BLD: 5 % (ref 38–73)
NEUTROPHILS NFR BLD: 52.4 % (ref 38–73)
NEUTROPHILS NFR BLD: 52.7 % (ref 38–73)
NEUTROPHILS NFR BLD: 56.1 % (ref 38–73)
NEUTROPHILS NFR BLD: 56.7 % (ref 38–73)
NEUTROPHILS NFR BLD: 57.2 % (ref 38–73)
NEUTROPHILS NFR BLD: 61.3 % (ref 38–73)
NEUTROPHILS NFR BLD: 61.7 % (ref 38–73)
NEUTROPHILS NFR BLD: 67.9 % (ref 38–73)
NEUTS BAND NFR BLD MANUAL: 3 %
NITRITE UR QL STRIP: NEGATIVE
NON-SQ EPI CELLS #/AREA URNS AUTO: 3 /HPF
NONHDLC SERPL-MCNC: 93 MG/DL
NRBC BLD-RTO: 0 /100 WBC
NRBC BLD-RTO: 1 /100 WBC
OSMOLALITY SERPL: 275 MOSM/KG (ref 275–295)
OSMOLALITY SERPL: 283 MOSM/KG (ref 275–295)
OSMOLALITY UR: 221 MOSM/KG (ref 50–1200)
OSMOLALITY UR: 485 MOSM/KG (ref 50–1200)
OVALOCYTES BLD QL SMEAR: ABNORMAL
PH UR STRIP: 5 [PH] (ref 5–8)
PH UR STRIP: 6 [PH] (ref 5–8)
PH UR STRIP: 6 [PH] (ref 5–8)
PH UR STRIP: 8 [PH] (ref 5–8)
PHOSPHATE SERPL-MCNC: 1.6 MG/DL (ref 2.7–4.5)
PHOSPHATE SERPL-MCNC: 1.7 MG/DL (ref 2.7–4.5)
PHOSPHATE SERPL-MCNC: 2 MG/DL (ref 2.7–4.5)
PHOSPHATE SERPL-MCNC: 2.3 MG/DL (ref 2.7–4.5)
PHOSPHATE SERPL-MCNC: 2.5 MG/DL (ref 2.7–4.5)
PHOSPHATE SERPL-MCNC: 2.6 MG/DL (ref 2.7–4.5)
PHOSPHATE SERPL-MCNC: 2.6 MG/DL (ref 2.7–4.5)
PHOSPHATE SERPL-MCNC: 2.7 MG/DL (ref 2.7–4.5)
PHOSPHATE SERPL-MCNC: 2.7 MG/DL (ref 2.7–4.5)
PHOSPHATE SERPL-MCNC: 2.8 MG/DL (ref 2.7–4.5)
PHOSPHATE SERPL-MCNC: 2.9 MG/DL (ref 2.7–4.5)
PHOSPHATE SERPL-MCNC: 2.9 MG/DL (ref 2.7–4.5)
PHOSPHATE SERPL-MCNC: 3.1 MG/DL (ref 2.7–4.5)
PHOSPHATE SERPL-MCNC: 3.1 MG/DL (ref 2.7–4.5)
PHOSPHATE SERPL-MCNC: 3.2 MG/DL (ref 2.7–4.5)
PHOSPHATE SERPL-MCNC: 3.3 MG/DL (ref 2.7–4.5)
PHOSPHATE SERPL-MCNC: 3.4 MG/DL (ref 2.7–4.5)
PHOSPHATE SERPL-MCNC: 3.6 MG/DL (ref 2.7–4.5)
PHOSPHATE SERPL-MCNC: 3.8 MG/DL (ref 2.7–4.5)
PHOSPHATE SERPL-MCNC: 3.8 MG/DL (ref 2.7–4.5)
PHOSPHATE SERPL-MCNC: 4.6 MG/DL (ref 2.7–4.5)
PISA MRMAX VEL: 0.04 M/S
PISA MRMAX VEL: 0.04 M/S
PISA MRMAX VEL: 0.05 M/S
PISA TR MAX VEL: 2.82 M/S
PISA TR MAX VEL: 3.04 M/S
PISA TR MAX VEL: 3.2 M/S
PISA TR MAX VEL: 3.46 M/S
PLATELET # BLD AUTO: 151 K/UL (ref 150–450)
PLATELET # BLD AUTO: 161 K/UL (ref 150–450)
PLATELET # BLD AUTO: 170 K/UL (ref 150–450)
PLATELET # BLD AUTO: 188 K/UL (ref 150–450)
PLATELET # BLD AUTO: 188 K/UL (ref 150–450)
PLATELET # BLD AUTO: 202 K/UL (ref 150–450)
PLATELET # BLD AUTO: 206 K/UL (ref 150–450)
PLATELET # BLD AUTO: 211 K/UL (ref 150–450)
PLATELET # BLD AUTO: 212 K/UL (ref 150–450)
PLATELET # BLD AUTO: 225 K/UL (ref 150–450)
PLATELET # BLD AUTO: 232 K/UL (ref 150–450)
PLATELET # BLD AUTO: 234 K/UL (ref 150–450)
PLATELET # BLD AUTO: 239 K/UL (ref 150–450)
PLATELET # BLD AUTO: 240 K/UL (ref 150–450)
PLATELET # BLD AUTO: 244 K/UL (ref 150–450)
PLATELET # BLD AUTO: 245 K/UL (ref 150–450)
PLATELET # BLD AUTO: 251 K/UL (ref 150–450)
PLATELET # BLD AUTO: 255 K/UL (ref 150–450)
PLATELET # BLD AUTO: 257 K/UL (ref 150–450)
PLATELET # BLD AUTO: 261 K/UL (ref 150–450)
PLATELET # BLD AUTO: 262 K/UL (ref 150–450)
PLATELET # BLD AUTO: 264 K/UL (ref 150–450)
PLATELET # BLD AUTO: 264 K/UL (ref 150–450)
PLATELET # BLD AUTO: 268 K/UL (ref 150–450)
PLATELET # BLD AUTO: 274 K/UL (ref 150–450)
PLATELET # BLD AUTO: 279 K/UL (ref 150–450)
PLATELET # BLD AUTO: 285 K/UL (ref 150–450)
PLATELET # BLD AUTO: 285 K/UL (ref 150–450)
PLATELET # BLD AUTO: 293 K/UL (ref 150–450)
PLATELET # BLD AUTO: 301 K/UL (ref 150–450)
PLATELET # BLD AUTO: 313 K/UL (ref 150–450)
PLATELET # BLD AUTO: 347 K/UL (ref 150–450)
PLATELET # BLD AUTO: 369 K/UL (ref 150–450)
PLATELET BLD QL SMEAR: ABNORMAL
PMV BLD AUTO: 10 FL (ref 9.2–12.9)
PMV BLD AUTO: 10.1 FL (ref 9.2–12.9)
PMV BLD AUTO: 10.1 FL (ref 9.2–12.9)
PMV BLD AUTO: 10.2 FL (ref 9.2–12.9)
PMV BLD AUTO: 10.3 FL (ref 9.2–12.9)
PMV BLD AUTO: 10.4 FL (ref 9.2–12.9)
PMV BLD AUTO: 10.5 FL (ref 9.2–12.9)
PMV BLD AUTO: 10.5 FL (ref 9.2–12.9)
PMV BLD AUTO: 10.6 FL (ref 9.2–12.9)
PMV BLD AUTO: 10.7 FL (ref 9.2–12.9)
PMV BLD AUTO: 10.9 FL (ref 9.2–12.9)
PMV BLD AUTO: 11 FL (ref 9.2–12.9)
PMV BLD AUTO: 11.1 FL (ref 9.2–12.9)
PMV BLD AUTO: 11.2 FL (ref 9.2–12.9)
PMV BLD AUTO: 11.2 FL (ref 9.2–12.9)
PMV BLD AUTO: 9.6 FL (ref 9.2–12.9)
PMV BLD AUTO: 9.7 FL (ref 9.2–12.9)
PMV BLD AUTO: 9.7 FL (ref 9.2–12.9)
PMV BLD AUTO: 9.8 FL (ref 9.2–12.9)
POCT GLUCOSE: 104 MG/DL (ref 70–110)
POCT GLUCOSE: 105 MG/DL (ref 70–110)
POCT GLUCOSE: 113 MG/DL (ref 70–110)
POCT GLUCOSE: 117 MG/DL (ref 70–110)
POCT GLUCOSE: 126 MG/DL (ref 70–110)
POCT GLUCOSE: 137 MG/DL (ref 70–110)
POCT GLUCOSE: 142 MG/DL (ref 70–110)
POCT GLUCOSE: 144 MG/DL (ref 70–110)
POCT GLUCOSE: 146 MG/DL (ref 70–110)
POCT GLUCOSE: 148 MG/DL (ref 70–110)
POCT GLUCOSE: 153 MG/DL (ref 70–110)
POCT GLUCOSE: 173 MG/DL (ref 70–110)
POCT GLUCOSE: 178 MG/DL (ref 70–110)
POCT GLUCOSE: 94 MG/DL (ref 70–110)
POIKILOCYTOSIS BLD QL SMEAR: SLIGHT
POLYCHROMASIA BLD QL SMEAR: ABNORMAL
POTASSIUM SERPL-SCNC: 2.7 MMOL/L (ref 3.5–5.1)
POTASSIUM SERPL-SCNC: 3 MMOL/L (ref 3.5–5.1)
POTASSIUM SERPL-SCNC: 3.1 MMOL/L (ref 3.5–5.1)
POTASSIUM SERPL-SCNC: 3.2 MMOL/L (ref 3.5–5.1)
POTASSIUM SERPL-SCNC: 3.2 MMOL/L (ref 3.5–5.1)
POTASSIUM SERPL-SCNC: 3.3 MMOL/L (ref 3.5–5.1)
POTASSIUM SERPL-SCNC: 3.4 MMOL/L (ref 3.5–5.1)
POTASSIUM SERPL-SCNC: 3.5 MMOL/L (ref 3.5–5.1)
POTASSIUM SERPL-SCNC: 3.6 MMOL/L (ref 3.5–5.1)
POTASSIUM SERPL-SCNC: 3.7 MMOL/L (ref 3.5–5.1)
POTASSIUM SERPL-SCNC: 3.7 MMOL/L (ref 3.5–5.1)
POTASSIUM SERPL-SCNC: 3.8 MMOL/L (ref 3.5–5.1)
POTASSIUM SERPL-SCNC: 3.9 MMOL/L (ref 3.5–5.1)
POTASSIUM SERPL-SCNC: 4 MMOL/L (ref 3.5–5.1)
POTASSIUM SERPL-SCNC: 4.1 MMOL/L (ref 3.5–5.1)
POTASSIUM SERPL-SCNC: 4.2 MMOL/L (ref 3.5–5.1)
POTASSIUM SERPL-SCNC: 4.3 MMOL/L (ref 3.5–5.1)
POTASSIUM SERPL-SCNC: 4.4 MMOL/L (ref 3.5–5.1)
POTASSIUM SERPL-SCNC: 4.5 MMOL/L (ref 3.5–5.1)
POTASSIUM SERPL-SCNC: 4.6 MMOL/L (ref 3.5–5.1)
POTASSIUM SERPL-SCNC: 4.6 MMOL/L (ref 3.5–5.1)
POTASSIUM SERPL-SCNC: 4.7 MMOL/L (ref 3.5–5.1)
POTASSIUM SERPL-SCNC: 4.8 MMOL/L (ref 3.5–5.1)
POTASSIUM SERPL-SCNC: 4.8 MMOL/L (ref 3.5–5.1)
POTASSIUM SERPL-SCNC: 5.1 MMOL/L (ref 3.5–5.1)
POTASSIUM SERPL-SCNC: 5.3 MMOL/L (ref 3.5–5.1)
POTASSIUM SERPL-SCNC: 5.6 MMOL/L (ref 3.5–5.1)
PREALB SERPL-MCNC: 10 MG/DL (ref 20–43)
PREALB SERPL-MCNC: 10 MG/DL (ref 20–43)
PREALB SERPL-MCNC: 3 MG/DL (ref 20–43)
PROCALCITONIN SERPL IA-MCNC: 0.05 NG/ML
PROCALCITONIN SERPL IA-MCNC: 0.42 NG/ML
PROCALCITONIN SERPL IA-MCNC: 1.6 NG/ML
PROT SERPL-MCNC: 6 G/DL (ref 6–8.4)
PROT SERPL-MCNC: 6.1 G/DL (ref 6–8.4)
PROT SERPL-MCNC: 6.2 G/DL (ref 6–8.4)
PROT SERPL-MCNC: 6.3 G/DL (ref 6–8.4)
PROT SERPL-MCNC: 6.4 G/DL (ref 6–8.4)
PROT SERPL-MCNC: 6.5 G/DL (ref 6–8.4)
PROT SERPL-MCNC: 6.6 G/DL (ref 6–8.4)
PROT SERPL-MCNC: 6.7 G/DL (ref 6–8.4)
PROT SERPL-MCNC: 6.8 G/DL (ref 6–8.4)
PROT SERPL-MCNC: 6.9 G/DL (ref 6–8.4)
PROT SERPL-MCNC: 7.1 G/DL (ref 6–8.4)
PROT SERPL-MCNC: 7.4 G/DL (ref 6–8.4)
PROT SERPL-MCNC: 8.4 G/DL (ref 6–8.4)
PROT SERPL-MCNC: 8.4 G/DL (ref 6–8.4)
PROT UR QL STRIP: ABNORMAL
PROT UR QL STRIP: NEGATIVE
PROTHROMBIN TIME: 12.5 SEC (ref 9–12.5)
PV PEAK S VEL: 0.43 M/S
PV PEAK VELOCITY: 0.74 CM/S
RA MAJOR: 4.63 CM
RA MAJOR: 4.68 CM
RA MAJOR: 4.83 CM
RA MAJOR: 5.36 CM
RA PRESSURE: 3 MMHG
RA PRESSURE: 3 MMHG
RA PRESSURE: 8 MMHG
RA WIDTH: 3.11 CM
RA WIDTH: 3.15 CM
RA WIDTH: 3.46 CM
RA WIDTH: 3.86 CM
RBC # BLD AUTO: 2.97 M/UL (ref 4–5.4)
RBC # BLD AUTO: 3.19 M/UL (ref 4–5.4)
RBC # BLD AUTO: 3.33 M/UL (ref 4–5.4)
RBC # BLD AUTO: 3.45 M/UL (ref 4–5.4)
RBC # BLD AUTO: 3.46 M/UL (ref 4–5.4)
RBC # BLD AUTO: 3.52 M/UL (ref 4–5.4)
RBC # BLD AUTO: 3.58 M/UL (ref 4–5.4)
RBC # BLD AUTO: 3.59 M/UL (ref 4–5.4)
RBC # BLD AUTO: 3.59 M/UL (ref 4–5.4)
RBC # BLD AUTO: 3.66 M/UL (ref 4–5.4)
RBC # BLD AUTO: 3.67 M/UL (ref 4–5.4)
RBC # BLD AUTO: 3.7 M/UL (ref 4–5.4)
RBC # BLD AUTO: 3.73 M/UL (ref 4–5.4)
RBC # BLD AUTO: 3.75 M/UL (ref 4–5.4)
RBC # BLD AUTO: 3.76 M/UL (ref 4–5.4)
RBC # BLD AUTO: 3.77 M/UL (ref 4–5.4)
RBC # BLD AUTO: 3.81 M/UL (ref 4–5.4)
RBC # BLD AUTO: 3.82 M/UL (ref 4–5.4)
RBC # BLD AUTO: 3.82 M/UL (ref 4–5.4)
RBC # BLD AUTO: 3.85 M/UL (ref 4–5.4)
RBC # BLD AUTO: 3.85 M/UL (ref 4–5.4)
RBC # BLD AUTO: 3.86 M/UL (ref 4–5.4)
RBC # BLD AUTO: 3.87 M/UL (ref 4–5.4)
RBC # BLD AUTO: 3.89 M/UL (ref 4–5.4)
RBC # BLD AUTO: 3.89 M/UL (ref 4–5.4)
RBC # BLD AUTO: 3.93 M/UL (ref 4–5.4)
RBC # BLD AUTO: 3.95 M/UL (ref 4–5.4)
RBC # BLD AUTO: 4 M/UL (ref 4–5.4)
RBC # BLD AUTO: 4.07 M/UL (ref 4–5.4)
RBC # BLD AUTO: 4.08 M/UL (ref 4–5.4)
RBC # BLD AUTO: 4.13 M/UL (ref 4–5.4)
RBC # BLD AUTO: 4.15 M/UL (ref 4–5.4)
RBC # BLD AUTO: 4.44 M/UL (ref 4–5.4)
RBC #/AREA URNS AUTO: 38 /HPF (ref 0–4)
RBC #/AREA URNS HPF: 5 /HPF (ref 0–4)
RBC #/AREA URNS HPF: 8 /HPF (ref 0–4)
RIGHT VENTRICULAR END-DIASTOLIC DIMENSION: 3.51 CM
RIGHT VENTRICULAR END-DIASTOLIC DIMENSION: 4.05 CM
RIGHT VENTRICULAR END-DIASTOLIC DIMENSION: 4.1 CM
ROULEAUX BLD QL SMEAR: PRESENT
RV TISSUE DOPPLER FREE WALL SYSTOLIC VELOCITY 1 (APICAL 4 CHAMBER VIEW): 8.58 CM/S
SARS-COV-2 RDRP RESP QL NAA+PROBE: NEGATIVE
SARS-COV-2 RNA RESP QL NAA+PROBE: NOT DETECTED
SATURATED IRON: 8 % (ref 20–50)
SCHISTOCYTES BLD QL SMEAR: ABNORMAL
SINUS: 2.6 CM
SINUS: 2.79 CM
SINUS: 3.15 CM
SINUS: 3.2 CM
SMUDGE CELLS BLD QL SMEAR: ABNORMAL
SMUDGE CELLS BLD QL SMEAR: PRESENT
SODIUM SERPL-SCNC: 125 MMOL/L (ref 136–145)
SODIUM SERPL-SCNC: 126 MMOL/L (ref 136–145)
SODIUM SERPL-SCNC: 127 MMOL/L (ref 136–145)
SODIUM SERPL-SCNC: 128 MMOL/L (ref 136–145)
SODIUM SERPL-SCNC: 129 MMOL/L (ref 136–145)
SODIUM SERPL-SCNC: 130 MMOL/L (ref 136–145)
SODIUM SERPL-SCNC: 131 MMOL/L (ref 136–145)
SODIUM SERPL-SCNC: 132 MMOL/L (ref 136–145)
SODIUM SERPL-SCNC: 133 MMOL/L (ref 136–145)
SODIUM SERPL-SCNC: 134 MMOL/L (ref 136–145)
SODIUM SERPL-SCNC: 135 MMOL/L (ref 136–145)
SODIUM SERPL-SCNC: 136 MMOL/L (ref 136–145)
SODIUM SERPL-SCNC: 137 MMOL/L (ref 136–145)
SODIUM SERPL-SCNC: 138 MMOL/L (ref 136–145)
SODIUM SERPL-SCNC: 138 MMOL/L (ref 136–145)
SODIUM SERPL-SCNC: 139 MMOL/L (ref 136–145)
SODIUM SERPL-SCNC: 140 MMOL/L (ref 136–145)
SODIUM UR-SCNC: <10 MMOL/L (ref 20–250)
SODIUM UR-SCNC: <20 MMOL/L (ref 20–250)
SP GR UR STRIP: 1.01 (ref 1–1.03)
SP GR UR STRIP: 1.01 (ref 1–1.03)
SP GR UR STRIP: <=1.005 (ref 1–1.03)
SP GR UR STRIP: >=1.03 (ref 1–1.03)
SPHEROCYTES BLD QL SMEAR: ABNORMAL
SQUAMOUS #/AREA URNS AUTO: 4 /HPF
STJ: 2.71 CM
STJ: 2.79 CM
STJ: 2.95 CM
STOMATOCYTES BLD QL SMEAR: PRESENT
TB INDURATION 48 - 72 HR READ: 0 MM
TDI LATERAL: 0.03 M/S
TDI LATERAL: 0.04 M/S
TDI SEPTAL: 0.02 M/S
TDI SEPTAL: 0.03 M/S
TDI SEPTAL: 0.04 M/S
TDI SEPTAL: 0.05 M/S
TDI: 0.03 M/S
TDI: 0.04 M/S
TOTAL IRON BINDING CAPACITY: 280 UG/DL (ref 250–450)
TOXIC GRANULES BLD QL SMEAR: PRESENT
TR MAX PG: 32 MMHG
TR MAX PG: 37 MMHG
TR MAX PG: 41 MMHG
TR MAX PG: 48 MMHG
TRANSFERRIN SERPL-MCNC: 189 MG/DL (ref 200–375)
TRICUSPID ANNULAR PLANE SYSTOLIC EXCURSION: 1.3 CM
TRICUSPID ANNULAR PLANE SYSTOLIC EXCURSION: 1.4 CM
TRICUSPID ANNULAR PLANE SYSTOLIC EXCURSION: 1.7 CM
TRICUSPID ANNULAR PLANE SYSTOLIC EXCURSION: 1.9 CM
TRIGL SERPL-MCNC: 112 MG/DL (ref 30–150)
TROPONIN I SERPL DL<=0.01 NG/ML-MCNC: 0.02 NG/ML (ref 0–0.03)
TROPONIN I SERPL DL<=0.01 NG/ML-MCNC: 0.02 NG/ML (ref 0–0.03)
TROPONIN I SERPL DL<=0.01 NG/ML-MCNC: 0.03 NG/ML (ref 0–0.03)
TV REST PULMONARY ARTERY PRESSURE: 35 MMHG
TV REST PULMONARY ARTERY PRESSURE: 45 MMHG
TV REST PULMONARY ARTERY PRESSURE: 51 MMHG
URATE SERPL-MCNC: 6.3 MG/DL (ref 2.4–5.7)
URATE SERPL-MCNC: 6.8 MG/DL (ref 2.4–5.7)
URN SPEC COLLECT METH UR: ABNORMAL
URN SPEC COLLECT METH UR: NORMAL
UROBILINOGEN UR STRIP-ACNC: 1 EU/DL
UROBILINOGEN UR STRIP-ACNC: NEGATIVE EU/DL
UROBILINOGEN UR STRIP-ACNC: NEGATIVE EU/DL
VANCOMYCIN TROUGH SERPL-MCNC: 12.5 UG/ML (ref 10–22)
VANCOMYCIN TROUGH SERPL-MCNC: 17.2 UG/ML (ref 10–22)
VANCOMYCIN TROUGH SERPL-MCNC: 19.4 UG/ML (ref 10–22)
VANCOMYCIN TROUGH SERPL-MCNC: 2.8 UG/ML (ref 10–22)
VANCOMYCIN TROUGH SERPL-MCNC: 24.2 UG/ML (ref 10–22)
VIT B12 SERPL-MCNC: 1452 PG/ML (ref 210–950)
WBC # BLD AUTO: 1.85 K/UL (ref 3.9–12.7)
WBC # BLD AUTO: 2.1 K/UL (ref 3.9–12.7)
WBC # BLD AUTO: 2.19 K/UL (ref 3.9–12.7)
WBC # BLD AUTO: 2.25 K/UL (ref 3.9–12.7)
WBC # BLD AUTO: 2.5 K/UL (ref 3.9–12.7)
WBC # BLD AUTO: 2.52 K/UL (ref 3.9–12.7)
WBC # BLD AUTO: 2.72 K/UL (ref 3.9–12.7)
WBC # BLD AUTO: 2.88 K/UL (ref 3.9–12.7)
WBC # BLD AUTO: 2.89 K/UL (ref 3.9–12.7)
WBC # BLD AUTO: 2.89 K/UL (ref 3.9–12.7)
WBC # BLD AUTO: 2.92 K/UL (ref 3.9–12.7)
WBC # BLD AUTO: 3.02 K/UL (ref 3.9–12.7)
WBC # BLD AUTO: 3.04 K/UL (ref 3.9–12.7)
WBC # BLD AUTO: 3.1 K/UL (ref 3.9–12.7)
WBC # BLD AUTO: 3.23 K/UL (ref 3.9–12.7)
WBC # BLD AUTO: 3.29 K/UL (ref 3.9–12.7)
WBC # BLD AUTO: 3.48 K/UL (ref 3.9–12.7)
WBC # BLD AUTO: 3.53 K/UL (ref 3.9–12.7)
WBC # BLD AUTO: 3.54 K/UL (ref 3.9–12.7)
WBC # BLD AUTO: 3.65 K/UL (ref 3.9–12.7)
WBC # BLD AUTO: 3.76 K/UL (ref 3.9–12.7)
WBC # BLD AUTO: 3.87 K/UL (ref 3.9–12.7)
WBC # BLD AUTO: 4.26 K/UL (ref 3.9–12.7)
WBC # BLD AUTO: 4.29 K/UL (ref 3.9–12.7)
WBC # BLD AUTO: 4.44 K/UL (ref 3.9–12.7)
WBC # BLD AUTO: 4.5 K/UL (ref 3.9–12.7)
WBC # BLD AUTO: 4.74 K/UL (ref 3.9–12.7)
WBC # BLD AUTO: 4.96 K/UL (ref 3.9–12.7)
WBC # BLD AUTO: 5 K/UL (ref 3.9–12.7)
WBC # BLD AUTO: 5.25 K/UL (ref 3.9–12.7)
WBC # BLD AUTO: 5.92 K/UL (ref 3.9–12.7)
WBC # BLD AUTO: 6.39 K/UL (ref 3.9–12.7)
WBC # BLD AUTO: 7.52 K/UL (ref 3.9–12.7)
WBC #/AREA URNS AUTO: 18 /HPF (ref 0–5)
WBC #/AREA URNS HPF: 10 /HPF (ref 0–5)
WBC #/AREA URNS HPF: 3 /HPF (ref 0–5)

## 2022-01-01 PROCEDURE — 25000003 PHARM REV CODE 250: Performed by: INTERNAL MEDICINE

## 2022-01-01 PROCEDURE — 63600175 PHARM REV CODE 636 W HCPCS: Performed by: INTERNAL MEDICINE

## 2022-01-01 PROCEDURE — 81001 URINALYSIS AUTO W/SCOPE: CPT | Performed by: PHYSICIAN ASSISTANT

## 2022-01-01 PROCEDURE — 99232 SBSQ HOSP IP/OBS MODERATE 35: CPT | Mod: GC,,, | Performed by: HOSPITALIST

## 2022-01-01 PROCEDURE — 93010 EKG 12-LEAD: ICD-10-PCS | Mod: ,,, | Performed by: INTERNAL MEDICINE

## 2022-01-01 PROCEDURE — 99233 SBSQ HOSP IP/OBS HIGH 50: CPT | Mod: ,,, | Performed by: INTERNAL MEDICINE

## 2022-01-01 PROCEDURE — 36415 COLL VENOUS BLD VENIPUNCTURE: CPT | Performed by: PHYSICIAN ASSISTANT

## 2022-01-01 PROCEDURE — 97165 OT EVAL LOW COMPLEX 30 MIN: CPT

## 2022-01-01 PROCEDURE — 84100 ASSAY OF PHOSPHORUS: CPT | Performed by: NURSE PRACTITIONER

## 2022-01-01 PROCEDURE — 3044F HG A1C LEVEL LT 7.0%: CPT | Mod: CPTII,S$GLB,, | Performed by: NURSE PRACTITIONER

## 2022-01-01 PROCEDURE — 25000003 PHARM REV CODE 250: Performed by: NURSE PRACTITIONER

## 2022-01-01 PROCEDURE — 3078F PR MOST RECENT DIASTOLIC BLOOD PRESSURE < 80 MM HG: ICD-10-PCS | Mod: CPTII,S$GLB,, | Performed by: INTERNAL MEDICINE

## 2022-01-01 PROCEDURE — 21400001 HC TELEMETRY ROOM

## 2022-01-01 PROCEDURE — 80202 ASSAY OF VANCOMYCIN: CPT | Performed by: HOSPITALIST

## 2022-01-01 PROCEDURE — 25000003 PHARM REV CODE 250: Performed by: SURGERY

## 2022-01-01 PROCEDURE — 82565 ASSAY OF CREATININE: CPT | Performed by: INTERNAL MEDICINE

## 2022-01-01 PROCEDURE — 85025 COMPLETE CBC W/AUTO DIFF WBC: CPT | Performed by: HOSPITALIST

## 2022-01-01 PROCEDURE — 36415 COLL VENOUS BLD VENIPUNCTURE: CPT | Performed by: HOSPITALIST

## 2022-01-01 PROCEDURE — 1160F RVW MEDS BY RX/DR IN RCRD: CPT | Mod: CPTII,S$GLB,, | Performed by: INTERNAL MEDICINE

## 2022-01-01 PROCEDURE — 99499 RISK ADDL DX/OHS AUDIT: ICD-10-PCS | Mod: S$GLB,,, | Performed by: STUDENT IN AN ORGANIZED HEALTH CARE EDUCATION/TRAINING PROGRAM

## 2022-01-01 PROCEDURE — 99232 PR SUBSEQUENT HOSPITAL CARE,LEVL II: ICD-10-PCS | Mod: ,,, | Performed by: FAMILY MEDICINE

## 2022-01-01 PROCEDURE — 84100 ASSAY OF PHOSPHORUS: CPT | Performed by: INTERNAL MEDICINE

## 2022-01-01 PROCEDURE — 1160F PR REVIEW ALL MEDS BY PRESCRIBER/CLIN PHARMACIST DOCUMENTED: ICD-10-PCS | Mod: CPTII,S$GLB,, | Performed by: INTERNAL MEDICINE

## 2022-01-01 PROCEDURE — 25000003 PHARM REV CODE 250: Performed by: HOSPITALIST

## 2022-01-01 PROCEDURE — 20600001 HC STEP DOWN PRIVATE ROOM

## 2022-01-01 PROCEDURE — U0002 COVID-19 LAB TEST NON-CDC: HCPCS | Performed by: NURSE PRACTITIONER

## 2022-01-01 PROCEDURE — 27000207 HC ISOLATION

## 2022-01-01 PROCEDURE — 4010F PR ACE/ARB THEARPY RXD/TAKEN: ICD-10-PCS | Mod: CPTII,S$GLB,, | Performed by: INTERNAL MEDICINE

## 2022-01-01 PROCEDURE — 3074F SYST BP LT 130 MM HG: CPT | Mod: CPTII,S$GLB,, | Performed by: INTERNAL MEDICINE

## 2022-01-01 PROCEDURE — 97535 SELF CARE MNGMENT TRAINING: CPT

## 2022-01-01 PROCEDURE — 93005 ELECTROCARDIOGRAM TRACING: CPT

## 2022-01-01 PROCEDURE — S0030 INJECTION, METRONIDAZOLE: HCPCS | Performed by: STUDENT IN AN ORGANIZED HEALTH CARE EDUCATION/TRAINING PROGRAM

## 2022-01-01 PROCEDURE — 80053 COMPREHEN METABOLIC PANEL: CPT | Performed by: EMERGENCY MEDICINE

## 2022-01-01 PROCEDURE — 83036 HEMOGLOBIN GLYCOSYLATED A1C: CPT | Performed by: STUDENT IN AN ORGANIZED HEALTH CARE EDUCATION/TRAINING PROGRAM

## 2022-01-01 PROCEDURE — 99233 PR SUBSEQUENT HOSPITAL CARE,LEVL III: ICD-10-PCS | Mod: ,,, | Performed by: INTERNAL MEDICINE

## 2022-01-01 PROCEDURE — 99233 SBSQ HOSP IP/OBS HIGH 50: CPT | Mod: ,,, | Performed by: HOSPITALIST

## 2022-01-01 PROCEDURE — 85007 BL SMEAR W/DIFF WBC COUNT: CPT | Performed by: INTERNAL MEDICINE

## 2022-01-01 PROCEDURE — 85025 COMPLETE CBC W/AUTO DIFF WBC: CPT | Performed by: STUDENT IN AN ORGANIZED HEALTH CARE EDUCATION/TRAINING PROGRAM

## 2022-01-01 PROCEDURE — G0378 HOSPITAL OBSERVATION PER HR: HCPCS

## 2022-01-01 PROCEDURE — 1159F MED LIST DOCD IN RCRD: CPT | Mod: CPTII,S$GLB,, | Performed by: CLINICAL NURSE SPECIALIST

## 2022-01-01 PROCEDURE — 3044F HG A1C LEVEL LT 7.0%: CPT | Mod: CPTII,S$GLB,, | Performed by: INTERNAL MEDICINE

## 2022-01-01 PROCEDURE — 99233 SBSQ HOSP IP/OBS HIGH 50: CPT | Mod: GC,,, | Performed by: HOSPITALIST

## 2022-01-01 PROCEDURE — 94761 N-INVAS EAR/PLS OXIMETRY MLT: CPT

## 2022-01-01 PROCEDURE — 85025 COMPLETE CBC W/AUTO DIFF WBC: CPT | Performed by: EMERGENCY MEDICINE

## 2022-01-01 PROCEDURE — 3074F PR MOST RECENT SYSTOLIC BLOOD PRESSURE < 130 MM HG: ICD-10-PCS | Mod: CPTII,S$GLB,, | Performed by: INTERNAL MEDICINE

## 2022-01-01 PROCEDURE — 99233 PR SUBSEQUENT HOSPITAL CARE,LEVL III: ICD-10-PCS | Mod: ,,, | Performed by: PHYSICIAN ASSISTANT

## 2022-01-01 PROCEDURE — 82570 ASSAY OF URINE CREATININE: CPT | Performed by: INTERNAL MEDICINE

## 2022-01-01 PROCEDURE — 99499 UNLISTED E&M SERVICE: CPT | Mod: S$GLB,,, | Performed by: INTERNAL MEDICINE

## 2022-01-01 PROCEDURE — 63600175 PHARM REV CODE 636 W HCPCS: Performed by: STUDENT IN AN ORGANIZED HEALTH CARE EDUCATION/TRAINING PROGRAM

## 2022-01-01 PROCEDURE — 25000003 PHARM REV CODE 250: Performed by: STUDENT IN AN ORGANIZED HEALTH CARE EDUCATION/TRAINING PROGRAM

## 2022-01-01 PROCEDURE — 97530 THERAPEUTIC ACTIVITIES: CPT

## 2022-01-01 PROCEDURE — 83735 ASSAY OF MAGNESIUM: CPT | Performed by: EMERGENCY MEDICINE

## 2022-01-01 PROCEDURE — 93010 ELECTROCARDIOGRAM REPORT: CPT | Mod: ,,, | Performed by: INTERNAL MEDICINE

## 2022-01-01 PROCEDURE — 84100 ASSAY OF PHOSPHORUS: CPT | Performed by: STUDENT IN AN ORGANIZED HEALTH CARE EDUCATION/TRAINING PROGRAM

## 2022-01-01 PROCEDURE — 11000001 HC ACUTE MED/SURG PRIVATE ROOM

## 2022-01-01 PROCEDURE — 96361 HYDRATE IV INFUSION ADD-ON: CPT

## 2022-01-01 PROCEDURE — 27000221 HC OXYGEN, UP TO 24 HOURS

## 2022-01-01 PROCEDURE — 87040 BLOOD CULTURE FOR BACTERIA: CPT | Performed by: EMERGENCY MEDICINE

## 2022-01-01 PROCEDURE — 99223 PR INITIAL HOSPITAL CARE,LEVL III: ICD-10-PCS | Mod: AI,,, | Performed by: STUDENT IN AN ORGANIZED HEALTH CARE EDUCATION/TRAINING PROGRAM

## 2022-01-01 PROCEDURE — 1159F PR MEDICATION LIST DOCUMENTED IN MEDICAL RECORD: ICD-10-PCS | Mod: CPTII,S$GLB,, | Performed by: INTERNAL MEDICINE

## 2022-01-01 PROCEDURE — 99239 HOSP IP/OBS DSCHRG MGMT >30: CPT | Mod: ,,, | Performed by: STUDENT IN AN ORGANIZED HEALTH CARE EDUCATION/TRAINING PROGRAM

## 2022-01-01 PROCEDURE — 1159F MED LIST DOCD IN RCRD: CPT | Mod: CPTII,S$GLB,, | Performed by: STUDENT IN AN ORGANIZED HEALTH CARE EDUCATION/TRAINING PROGRAM

## 2022-01-01 PROCEDURE — 99024 PR POST-OP FOLLOW-UP VISIT: ICD-10-PCS | Mod: ,,, | Performed by: SPECIALIST

## 2022-01-01 PROCEDURE — 80048 BASIC METABOLIC PNL TOTAL CA: CPT | Performed by: PHYSICIAN ASSISTANT

## 2022-01-01 PROCEDURE — 93295 DEV INTERROG REMOTE 1/2/MLT: CPT | Mod: S$GLB,,, | Performed by: INTERNAL MEDICINE

## 2022-01-01 PROCEDURE — 99214 PR OFFICE/OUTPT VISIT, EST, LEVL IV, 30-39 MIN: ICD-10-PCS | Mod: S$GLB,,, | Performed by: NURSE PRACTITIONER

## 2022-01-01 PROCEDURE — 99285 EMERGENCY DEPT VISIT HI MDM: CPT | Mod: 25

## 2022-01-01 PROCEDURE — 1100F PTFALLS ASSESS-DOCD GE2>/YR: CPT | Mod: CPTII,S$GLB,, | Performed by: STUDENT IN AN ORGANIZED HEALTH CARE EDUCATION/TRAINING PROGRAM

## 2022-01-01 PROCEDURE — 99233 PR SUBSEQUENT HOSPITAL CARE,LEVL III: ICD-10-PCS | Mod: ,,, | Performed by: HOSPITALIST

## 2022-01-01 PROCEDURE — 80053 COMPREHEN METABOLIC PANEL: CPT | Performed by: HOSPITALIST

## 2022-01-01 PROCEDURE — 99999 PR PBB SHADOW E&M-EST. PATIENT-LVL III: ICD-10-PCS | Mod: PBBFAC,,, | Performed by: INTERNAL MEDICINE

## 2022-01-01 PROCEDURE — 3044F HG A1C LEVEL LT 7.0%: CPT | Mod: CPTII,S$GLB,, | Performed by: STUDENT IN AN ORGANIZED HEALTH CARE EDUCATION/TRAINING PROGRAM

## 2022-01-01 PROCEDURE — 80048 BASIC METABOLIC PNL TOTAL CA: CPT | Performed by: INTERNAL MEDICINE

## 2022-01-01 PROCEDURE — 96365 THER/PROPH/DIAG IV INF INIT: CPT

## 2022-01-01 PROCEDURE — 1101F PT FALLS ASSESS-DOCD LE1/YR: CPT | Mod: CPTII,S$GLB,, | Performed by: STUDENT IN AN ORGANIZED HEALTH CARE EDUCATION/TRAINING PROGRAM

## 2022-01-01 PROCEDURE — 30200315 PPD INTRADERMAL TEST REV CODE 302: Performed by: STUDENT IN AN ORGANIZED HEALTH CARE EDUCATION/TRAINING PROGRAM

## 2022-01-01 PROCEDURE — 99233 PR SUBSEQUENT HOSPITAL CARE,LEVL III: ICD-10-PCS | Mod: GC,,, | Performed by: HOSPITALIST

## 2022-01-01 PROCEDURE — 3288F PR FALLS RISK ASSESSMENT DOCUMENTED: ICD-10-PCS | Mod: CPTII,S$GLB,, | Performed by: STUDENT IN AN ORGANIZED HEALTH CARE EDUCATION/TRAINING PROGRAM

## 2022-01-01 PROCEDURE — 1111F DSCHRG MED/CURRENT MED MERGE: CPT | Mod: CPTII,S$GLB,, | Performed by: INTERNAL MEDICINE

## 2022-01-01 PROCEDURE — 99233 SBSQ HOSP IP/OBS HIGH 50: CPT | Mod: ,,, | Performed by: PHYSICIAN ASSISTANT

## 2022-01-01 PROCEDURE — 80061 LIPID PANEL: CPT | Performed by: INTERNAL MEDICINE

## 2022-01-01 PROCEDURE — 99214 PR OFFICE/OUTPT VISIT, EST, LEVL IV, 30-39 MIN: ICD-10-PCS | Mod: S$GLB,,, | Performed by: INTERNAL MEDICINE

## 2022-01-01 PROCEDURE — 1159F MED LIST DOCD IN RCRD: CPT | Mod: CPTII,S$GLB,, | Performed by: INTERNAL MEDICINE

## 2022-01-01 PROCEDURE — 3008F PR BODY MASS INDEX (BMI) DOCUMENTED: ICD-10-PCS | Mod: CPTII,S$GLB,, | Performed by: STUDENT IN AN ORGANIZED HEALTH CARE EDUCATION/TRAINING PROGRAM

## 2022-01-01 PROCEDURE — 99213 OFFICE O/P EST LOW 20 MIN: CPT | Mod: S$GLB,,, | Performed by: STUDENT IN AN ORGANIZED HEALTH CARE EDUCATION/TRAINING PROGRAM

## 2022-01-01 PROCEDURE — 80053 COMPREHEN METABOLIC PANEL: CPT | Performed by: STUDENT IN AN ORGANIZED HEALTH CARE EDUCATION/TRAINING PROGRAM

## 2022-01-01 PROCEDURE — 96375 TX/PRO/DX INJ NEW DRUG ADDON: CPT

## 2022-01-01 PROCEDURE — 4010F ACE/ARB THERAPY RXD/TAKEN: CPT | Mod: CPTII,S$GLB,, | Performed by: INTERNAL MEDICINE

## 2022-01-01 PROCEDURE — 3008F BODY MASS INDEX DOCD: CPT | Mod: CPTII,S$GLB,, | Performed by: INTERNAL MEDICINE

## 2022-01-01 PROCEDURE — 36415 COLL VENOUS BLD VENIPUNCTURE: CPT | Performed by: STUDENT IN AN ORGANIZED HEALTH CARE EDUCATION/TRAINING PROGRAM

## 2022-01-01 PROCEDURE — 96374 THER/PROPH/DIAG INJ IV PUSH: CPT

## 2022-01-01 PROCEDURE — 25000003 PHARM REV CODE 250: Performed by: PHYSICIAN ASSISTANT

## 2022-01-01 PROCEDURE — 3008F BODY MASS INDEX DOCD: CPT | Mod: CPTII,S$GLB,, | Performed by: FAMILY MEDICINE

## 2022-01-01 PROCEDURE — 99999 PR PBB SHADOW E&M-EST. PATIENT-LVL III: CPT | Mod: PBBFAC,,, | Performed by: NURSE PRACTITIONER

## 2022-01-01 PROCEDURE — 99232 PR SUBSEQUENT HOSPITAL CARE,LEVL II: ICD-10-PCS | Mod: ,,, | Performed by: INTERNAL MEDICINE

## 2022-01-01 PROCEDURE — 51702 INSERT TEMP BLADDER CATH: CPT

## 2022-01-01 PROCEDURE — 83735 ASSAY OF MAGNESIUM: CPT | Performed by: INTERNAL MEDICINE

## 2022-01-01 PROCEDURE — 82570 ASSAY OF URINE CREATININE: CPT | Performed by: STUDENT IN AN ORGANIZED HEALTH CARE EDUCATION/TRAINING PROGRAM

## 2022-01-01 PROCEDURE — 3288F FALL RISK ASSESSMENT DOCD: CPT | Mod: CPTII,S$GLB,, | Performed by: INTERNAL MEDICINE

## 2022-01-01 PROCEDURE — 87186 SC STD MICRODIL/AGAR DIL: CPT | Mod: 59 | Performed by: PHYSICIAN ASSISTANT

## 2022-01-01 PROCEDURE — 97116 GAIT TRAINING THERAPY: CPT

## 2022-01-01 PROCEDURE — 80048 BASIC METABOLIC PNL TOTAL CA: CPT | Mod: 91,XB | Performed by: STUDENT IN AN ORGANIZED HEALTH CARE EDUCATION/TRAINING PROGRAM

## 2022-01-01 PROCEDURE — U0003 INFECTIOUS AGENT DETECTION BY NUCLEIC ACID (DNA OR RNA); SEVERE ACUTE RESPIRATORY SYNDROME CORONAVIRUS 2 (SARS-COV-2) (CORONAVIRUS DISEASE [COVID-19]), AMPLIFIED PROBE TECHNIQUE, MAKING USE OF HIGH THROUGHPUT TECHNOLOGIES AS DESCRIBED BY CMS-2020-01-R: HCPCS | Performed by: STUDENT IN AN ORGANIZED HEALTH CARE EDUCATION/TRAINING PROGRAM

## 2022-01-01 PROCEDURE — 84295 ASSAY OF SERUM SODIUM: CPT | Performed by: NURSE PRACTITIONER

## 2022-01-01 PROCEDURE — 85610 PROTHROMBIN TIME: CPT | Performed by: STUDENT IN AN ORGANIZED HEALTH CARE EDUCATION/TRAINING PROGRAM

## 2022-01-01 PROCEDURE — 99233 SBSQ HOSP IP/OBS HIGH 50: CPT | Mod: ,,, | Performed by: FAMILY MEDICINE

## 2022-01-01 PROCEDURE — 84100 ASSAY OF PHOSPHORUS: CPT | Performed by: HOSPITALIST

## 2022-01-01 PROCEDURE — 80048 BASIC METABOLIC PNL TOTAL CA: CPT | Performed by: NURSE PRACTITIONER

## 2022-01-01 PROCEDURE — 25000003 PHARM REV CODE 250: Performed by: FAMILY MEDICINE

## 2022-01-01 PROCEDURE — 63600175 PHARM REV CODE 636 W HCPCS: Performed by: PHYSICIAN ASSISTANT

## 2022-01-01 PROCEDURE — 63600175 PHARM REV CODE 636 W HCPCS: Performed by: NURSE PRACTITIONER

## 2022-01-01 PROCEDURE — 99291 CRITICAL CARE FIRST HOUR: CPT | Mod: 25

## 2022-01-01 PROCEDURE — 1101F PR PT FALLS ASSESS DOC 0-1 FALLS W/OUT INJ PAST YR: ICD-10-PCS | Mod: CPTII,S$GLB,, | Performed by: STUDENT IN AN ORGANIZED HEALTH CARE EDUCATION/TRAINING PROGRAM

## 2022-01-01 PROCEDURE — 83735 ASSAY OF MAGNESIUM: CPT | Performed by: HOSPITALIST

## 2022-01-01 PROCEDURE — 30200315 PPD INTRADERMAL TEST REV CODE 302: Performed by: HOSPITALIST

## 2022-01-01 PROCEDURE — 97110 THERAPEUTIC EXERCISES: CPT | Mod: CQ

## 2022-01-01 PROCEDURE — 99024 POSTOP FOLLOW-UP VISIT: CPT | Mod: ,,, | Performed by: SPECIALIST

## 2022-01-01 PROCEDURE — 3288F FALL RISK ASSESSMENT DOCD: CPT | Mod: CPTII,S$GLB,, | Performed by: STUDENT IN AN ORGANIZED HEALTH CARE EDUCATION/TRAINING PROGRAM

## 2022-01-01 PROCEDURE — 1125F AMNT PAIN NOTED PAIN PRSNT: CPT | Mod: CPTII,S$GLB,, | Performed by: STUDENT IN AN ORGANIZED HEALTH CARE EDUCATION/TRAINING PROGRAM

## 2022-01-01 PROCEDURE — 3008F BODY MASS INDEX DOCD: CPT | Mod: CPTII,S$GLB,, | Performed by: STUDENT IN AN ORGANIZED HEALTH CARE EDUCATION/TRAINING PROGRAM

## 2022-01-01 PROCEDURE — 3288F PR FALLS RISK ASSESSMENT DOCUMENTED: ICD-10-PCS | Mod: CPTII,S$GLB,, | Performed by: INTERNAL MEDICINE

## 2022-01-01 PROCEDURE — 36415 COLL VENOUS BLD VENIPUNCTURE: CPT | Performed by: EMERGENCY MEDICINE

## 2022-01-01 PROCEDURE — 99239 HOSP IP/OBS DSCHRG MGMT >30: CPT | Mod: ,,, | Performed by: HOSPITALIST

## 2022-01-01 PROCEDURE — 94799 UNLISTED PULMONARY SVC/PX: CPT

## 2022-01-01 PROCEDURE — 3044F HG A1C LEVEL LT 7.0%: CPT | Mod: CPTII,S$GLB,, | Performed by: FAMILY MEDICINE

## 2022-01-01 PROCEDURE — 93295 PR INTERROG EVAL, REMOTE, UP TO 90 DAYS,CARDVERT/DEFIB: ICD-10-PCS | Mod: S$GLB,,, | Performed by: INTERNAL MEDICINE

## 2022-01-01 PROCEDURE — 80048 BASIC METABOLIC PNL TOTAL CA: CPT | Mod: XB | Performed by: STUDENT IN AN ORGANIZED HEALTH CARE EDUCATION/TRAINING PROGRAM

## 2022-01-01 PROCEDURE — 83880 ASSAY OF NATRIURETIC PEPTIDE: CPT | Performed by: EMERGENCY MEDICINE

## 2022-01-01 PROCEDURE — 25500020 PHARM REV CODE 255: Performed by: EMERGENCY MEDICINE

## 2022-01-01 PROCEDURE — 99900035 HC TECH TIME PER 15 MIN (STAT)

## 2022-01-01 PROCEDURE — 85025 COMPLETE CBC W/AUTO DIFF WBC: CPT | Performed by: INTERNAL MEDICINE

## 2022-01-01 PROCEDURE — 63600175 PHARM REV CODE 636 W HCPCS: Performed by: FAMILY MEDICINE

## 2022-01-01 PROCEDURE — 36415 COLL VENOUS BLD VENIPUNCTURE: CPT | Performed by: NURSE PRACTITIONER

## 2022-01-01 PROCEDURE — 63600175 PHARM REV CODE 636 W HCPCS: Performed by: HOSPITALIST

## 2022-01-01 PROCEDURE — 83735 ASSAY OF MAGNESIUM: CPT | Performed by: PHYSICIAN ASSISTANT

## 2022-01-01 PROCEDURE — 87077 CULTURE AEROBIC IDENTIFY: CPT | Performed by: PHYSICIAN ASSISTANT

## 2022-01-01 PROCEDURE — 36415 COLL VENOUS BLD VENIPUNCTURE: CPT | Performed by: INTERNAL MEDICINE

## 2022-01-01 PROCEDURE — 99499 RISK ADDL DX/OHS AUDIT: ICD-10-PCS | Mod: S$GLB,,, | Performed by: INTERNAL MEDICINE

## 2022-01-01 PROCEDURE — 99999 PR PBB SHADOW E&M-EST. PATIENT-LVL III: ICD-10-PCS | Mod: PBBFAC,,, | Performed by: NURSE PRACTITIONER

## 2022-01-01 PROCEDURE — 99024 POSTOP FOLLOW-UP VISIT: CPT | Mod: ,,, | Performed by: SURGERY

## 2022-01-01 PROCEDURE — 3008F PR BODY MASS INDEX (BMI) DOCUMENTED: ICD-10-PCS | Mod: CPTII,S$GLB,, | Performed by: INTERNAL MEDICINE

## 2022-01-01 PROCEDURE — 99223 PR INITIAL HOSPITAL CARE,LEVL III: ICD-10-PCS | Mod: ,,, | Performed by: NURSE PRACTITIONER

## 2022-01-01 PROCEDURE — 99214 OFFICE O/P EST MOD 30 MIN: CPT | Mod: S$GLB,,, | Performed by: INTERNAL MEDICINE

## 2022-01-01 PROCEDURE — 96376 TX/PRO/DX INJ SAME DRUG ADON: CPT

## 2022-01-01 PROCEDURE — 97161 PT EVAL LOW COMPLEX 20 MIN: CPT

## 2022-01-01 PROCEDURE — 99214 OFFICE O/P EST MOD 30 MIN: CPT | Mod: 25,S$GLB,, | Performed by: INTERNAL MEDICINE

## 2022-01-01 PROCEDURE — 3078F PR MOST RECENT DIASTOLIC BLOOD PRESSURE < 80 MM HG: ICD-10-PCS | Mod: CPTII,S$GLB,, | Performed by: STUDENT IN AN ORGANIZED HEALTH CARE EDUCATION/TRAINING PROGRAM

## 2022-01-01 PROCEDURE — 99233 PR SUBSEQUENT HOSPITAL CARE,LEVL III: ICD-10-PCS | Mod: ,,, | Performed by: COLON & RECTAL SURGERY

## 2022-01-01 PROCEDURE — 80048 BASIC METABOLIC PNL TOTAL CA: CPT | Performed by: EMERGENCY MEDICINE

## 2022-01-01 PROCEDURE — 83605 ASSAY OF LACTIC ACID: CPT | Performed by: NURSE PRACTITIONER

## 2022-01-01 PROCEDURE — 99232 SBSQ HOSP IP/OBS MODERATE 35: CPT | Mod: ,,, | Performed by: FAMILY MEDICINE

## 2022-01-01 PROCEDURE — 25000242 PHARM REV CODE 250 ALT 637 W/ HCPCS: Performed by: STUDENT IN AN ORGANIZED HEALTH CARE EDUCATION/TRAINING PROGRAM

## 2022-01-01 PROCEDURE — 99232 SBSQ HOSP IP/OBS MODERATE 35: CPT | Mod: ,,, | Performed by: INTERNAL MEDICINE

## 2022-01-01 PROCEDURE — 1125F AMNT PAIN NOTED PAIN PRSNT: CPT | Mod: CPTII,S$GLB,, | Performed by: INTERNAL MEDICINE

## 2022-01-01 PROCEDURE — 99232 PR SUBSEQUENT HOSPITAL CARE,LEVL II: ICD-10-PCS | Mod: ,,, | Performed by: NURSE PRACTITIONER

## 2022-01-01 PROCEDURE — 99223 1ST HOSP IP/OBS HIGH 75: CPT | Mod: AI,,, | Performed by: HOSPITALIST

## 2022-01-01 PROCEDURE — 92610 EVALUATE SWALLOWING FUNCTION: CPT

## 2022-01-01 PROCEDURE — 99226 PR SUBSEQUENT OBSERVATION CARE,LEVEL III: CPT | Mod: ,,, | Performed by: FAMILY MEDICINE

## 2022-01-01 PROCEDURE — 83935 ASSAY OF URINE OSMOLALITY: CPT | Performed by: STUDENT IN AN ORGANIZED HEALTH CARE EDUCATION/TRAINING PROGRAM

## 2022-01-01 PROCEDURE — 83735 ASSAY OF MAGNESIUM: CPT | Performed by: STUDENT IN AN ORGANIZED HEALTH CARE EDUCATION/TRAINING PROGRAM

## 2022-01-01 PROCEDURE — 80048 BASIC METABOLIC PNL TOTAL CA: CPT | Mod: 91 | Performed by: INTERNAL MEDICINE

## 2022-01-01 PROCEDURE — G0180 PR HOME HEALTH MD CERTIFICATION: ICD-10-PCS | Mod: ,,, | Performed by: HOSPITALIST

## 2022-01-01 PROCEDURE — 80048 BASIC METABOLIC PNL TOTAL CA: CPT | Performed by: FAMILY MEDICINE

## 2022-01-01 PROCEDURE — 83880 ASSAY OF NATRIURETIC PEPTIDE: CPT | Performed by: HOSPITALIST

## 2022-01-01 PROCEDURE — 99999 PR PBB SHADOW E&M-EST. PATIENT-LVL II: ICD-10-PCS | Mod: PBBFAC,,, | Performed by: STUDENT IN AN ORGANIZED HEALTH CARE EDUCATION/TRAINING PROGRAM

## 2022-01-01 PROCEDURE — 3044F HG A1C LEVEL LT 7.0%: CPT | Mod: CPTII,S$GLB,, | Performed by: CLINICAL NURSE SPECIALIST

## 2022-01-01 PROCEDURE — 3044F PR MOST RECENT HEMOGLOBIN A1C LEVEL <7.0%: ICD-10-PCS | Mod: CPTII,S$GLB,, | Performed by: INTERNAL MEDICINE

## 2022-01-01 PROCEDURE — 99285 PR EMERGENCY DEPT VISIT,LEVEL V: ICD-10-PCS | Mod: ,,, | Performed by: EMERGENCY MEDICINE

## 2022-01-01 PROCEDURE — 25000242 PHARM REV CODE 250 ALT 637 W/ HCPCS: Performed by: PHYSICIAN ASSISTANT

## 2022-01-01 PROCEDURE — 97116 GAIT TRAINING THERAPY: CPT | Mod: CQ

## 2022-01-01 PROCEDURE — 99232 SBSQ HOSP IP/OBS MODERATE 35: CPT | Mod: ,,, | Performed by: STUDENT IN AN ORGANIZED HEALTH CARE EDUCATION/TRAINING PROGRAM

## 2022-01-01 PROCEDURE — 99232 SBSQ HOSP IP/OBS MODERATE 35: CPT | Mod: GC,,, | Performed by: INTERNAL MEDICINE

## 2022-01-01 PROCEDURE — 84484 ASSAY OF TROPONIN QUANT: CPT | Performed by: INTERNAL MEDICINE

## 2022-01-01 PROCEDURE — 25500020 PHARM REV CODE 255: Performed by: FAMILY MEDICINE

## 2022-01-01 PROCEDURE — 99999 PR PBB SHADOW E&M-EST. PATIENT-LVL II: CPT | Mod: PBBFAC,,, | Performed by: CLINICAL NURSE SPECIALIST

## 2022-01-01 PROCEDURE — 3078F DIAST BP <80 MM HG: CPT | Mod: CPTII,S$GLB,, | Performed by: FAMILY MEDICINE

## 2022-01-01 PROCEDURE — 4010F PR ACE/ARB THEARPY RXD/TAKEN: ICD-10-PCS | Mod: CPTII,S$GLB,, | Performed by: STUDENT IN AN ORGANIZED HEALTH CARE EDUCATION/TRAINING PROGRAM

## 2022-01-01 PROCEDURE — 1111F DSCHRG MED/CURRENT MED MERGE: CPT | Mod: CPTII,S$GLB,, | Performed by: FAMILY MEDICINE

## 2022-01-01 PROCEDURE — 1125F PR PAIN SEVERITY QUANTIFIED, PAIN PRESENT: ICD-10-PCS | Mod: CPTII,S$GLB,, | Performed by: STUDENT IN AN ORGANIZED HEALTH CARE EDUCATION/TRAINING PROGRAM

## 2022-01-01 PROCEDURE — 90832 PR PSYCHOTHERAPY W/PATIENT, 30 MIN: ICD-10-PCS | Mod: ,,, | Performed by: STUDENT IN AN ORGANIZED HEALTH CARE EDUCATION/TRAINING PROGRAM

## 2022-01-01 PROCEDURE — 99223 1ST HOSP IP/OBS HIGH 75: CPT | Mod: ,,, | Performed by: INTERNAL MEDICINE

## 2022-01-01 PROCEDURE — 83735 ASSAY OF MAGNESIUM: CPT | Performed by: FAMILY MEDICINE

## 2022-01-01 PROCEDURE — 99239 PR HOSPITAL DISCHARGE DAY,>30 MIN: ICD-10-PCS | Mod: ,,, | Performed by: STUDENT IN AN ORGANIZED HEALTH CARE EDUCATION/TRAINING PROGRAM

## 2022-01-01 PROCEDURE — 99999 PR PBB SHADOW E&M-EST. PATIENT-LVL II: CPT | Mod: PBBFAC,,, | Performed by: STUDENT IN AN ORGANIZED HEALTH CARE EDUCATION/TRAINING PROGRAM

## 2022-01-01 PROCEDURE — 3008F PR BODY MASS INDEX (BMI) DOCUMENTED: ICD-10-PCS | Mod: CPTII,S$GLB,, | Performed by: FAMILY MEDICINE

## 2022-01-01 PROCEDURE — 83605 ASSAY OF LACTIC ACID: CPT | Performed by: PHYSICIAN ASSISTANT

## 2022-01-01 PROCEDURE — 99232 PR SUBSEQUENT HOSPITAL CARE,LEVL II: ICD-10-PCS | Mod: ,,, | Performed by: STUDENT IN AN ORGANIZED HEALTH CARE EDUCATION/TRAINING PROGRAM

## 2022-01-01 PROCEDURE — 1159F PR MEDICATION LIST DOCUMENTED IN MEDICAL RECORD: ICD-10-PCS | Mod: CPTII,S$GLB,, | Performed by: NURSE PRACTITIONER

## 2022-01-01 PROCEDURE — 80048 BASIC METABOLIC PNL TOTAL CA: CPT | Performed by: STUDENT IN AN ORGANIZED HEALTH CARE EDUCATION/TRAINING PROGRAM

## 2022-01-01 PROCEDURE — 80053 COMPREHEN METABOLIC PANEL: CPT | Performed by: NURSE PRACTITIONER

## 2022-01-01 PROCEDURE — 80048 BASIC METABOLIC PNL TOTAL CA: CPT | Mod: XB | Performed by: PHYSICIAN ASSISTANT

## 2022-01-01 PROCEDURE — 99223 1ST HOSP IP/OBS HIGH 75: CPT | Mod: AI,,, | Performed by: INTERNAL MEDICINE

## 2022-01-01 PROCEDURE — 99215 PR OFFICE/OUTPT VISIT, EST, LEVL V, 40-54 MIN: ICD-10-PCS | Mod: S$GLB,,, | Performed by: INTERNAL MEDICINE

## 2022-01-01 PROCEDURE — 84134 ASSAY OF PREALBUMIN: CPT | Performed by: NURSE PRACTITIONER

## 2022-01-01 PROCEDURE — 87086 URINE CULTURE/COLONY COUNT: CPT | Performed by: PHYSICIAN ASSISTANT

## 2022-01-01 PROCEDURE — 99233 PR SUBSEQUENT HOSPITAL CARE,LEVL III: ICD-10-PCS | Mod: ,,, | Performed by: NURSE PRACTITIONER

## 2022-01-01 PROCEDURE — 83880 ASSAY OF NATRIURETIC PEPTIDE: CPT | Performed by: INTERNAL MEDICINE

## 2022-01-01 PROCEDURE — 99215 OFFICE O/P EST HI 40 MIN: CPT | Mod: S$GLB,,, | Performed by: INTERNAL MEDICINE

## 2022-01-01 PROCEDURE — 99239 HOSP IP/OBS DSCHRG MGMT >30: CPT | Mod: ,,, | Performed by: INTERNAL MEDICINE

## 2022-01-01 PROCEDURE — 83036 HEMOGLOBIN GLYCOSYLATED A1C: CPT | Performed by: NURSE PRACTITIONER

## 2022-01-01 PROCEDURE — 85007 BL SMEAR W/DIFF WBC COUNT: CPT | Performed by: HOSPITALIST

## 2022-01-01 PROCEDURE — 3044F PR MOST RECENT HEMOGLOBIN A1C LEVEL <7.0%: ICD-10-PCS | Mod: CPTII,S$GLB,, | Performed by: CLINICAL NURSE SPECIALIST

## 2022-01-01 PROCEDURE — C1751 CATH, INF, PER/CENT/MIDLINE: HCPCS

## 2022-01-01 PROCEDURE — 1159F PR MEDICATION LIST DOCUMENTED IN MEDICAL RECORD: ICD-10-PCS | Mod: CPTII,S$GLB,, | Performed by: STUDENT IN AN ORGANIZED HEALTH CARE EDUCATION/TRAINING PROGRAM

## 2022-01-01 PROCEDURE — 1101F PT FALLS ASSESS-DOCD LE1/YR: CPT | Mod: CPTII,S$GLB,, | Performed by: INTERNAL MEDICINE

## 2022-01-01 PROCEDURE — 99233 PR SUBSEQUENT HOSPITAL CARE,LEVL III: ICD-10-PCS | Mod: ,,, | Performed by: FAMILY MEDICINE

## 2022-01-01 PROCEDURE — 1160F RVW MEDS BY RX/DR IN RCRD: CPT | Mod: CPTII,95,, | Performed by: INTERNAL MEDICINE

## 2022-01-01 PROCEDURE — 99223 PR INITIAL HOSPITAL CARE,LEVL III: ICD-10-PCS | Mod: ,,, | Performed by: INTERNAL MEDICINE

## 2022-01-01 PROCEDURE — 1111F PR DISCHARGE MEDS RECONCILED W/ CURRENT OUTPATIENT MED LIST: ICD-10-PCS | Mod: CPTII,S$GLB,, | Performed by: INTERNAL MEDICINE

## 2022-01-01 PROCEDURE — 3078F DIAST BP <80 MM HG: CPT | Mod: CPTII,S$GLB,, | Performed by: INTERNAL MEDICINE

## 2022-01-01 PROCEDURE — 82607 VITAMIN B-12: CPT | Performed by: NURSE PRACTITIONER

## 2022-01-01 PROCEDURE — 84145 PROCALCITONIN (PCT): CPT | Performed by: EMERGENCY MEDICINE

## 2022-01-01 PROCEDURE — 25500020 PHARM REV CODE 255: Performed by: STUDENT IN AN ORGANIZED HEALTH CARE EDUCATION/TRAINING PROGRAM

## 2022-01-01 PROCEDURE — 83735 ASSAY OF MAGNESIUM: CPT | Performed by: NURSE PRACTITIONER

## 2022-01-01 PROCEDURE — 97110 THERAPEUTIC EXERCISES: CPT

## 2022-01-01 PROCEDURE — 99239 PR HOSPITAL DISCHARGE DAY,>30 MIN: ICD-10-PCS | Mod: ,,, | Performed by: INTERNAL MEDICINE

## 2022-01-01 PROCEDURE — 82533 TOTAL CORTISOL: CPT | Performed by: STUDENT IN AN ORGANIZED HEALTH CARE EDUCATION/TRAINING PROGRAM

## 2022-01-01 PROCEDURE — 4010F ACE/ARB THERAPY RXD/TAKEN: CPT | Mod: CPTII,S$GLB,, | Performed by: STUDENT IN AN ORGANIZED HEALTH CARE EDUCATION/TRAINING PROGRAM

## 2022-01-01 PROCEDURE — 99232 PR SUBSEQUENT HOSPITAL CARE,LEVL II: ICD-10-PCS | Mod: ,,, | Performed by: HOSPITALIST

## 2022-01-01 PROCEDURE — 83880 ASSAY OF NATRIURETIC PEPTIDE: CPT | Performed by: FAMILY MEDICINE

## 2022-01-01 PROCEDURE — G0180 MD CERTIFICATION HHA PATIENT: HCPCS | Mod: ,,, | Performed by: HOSPITALIST

## 2022-01-01 PROCEDURE — 99999 PR PBB SHADOW E&M-EST. PATIENT-LVL IV: ICD-10-PCS | Mod: PBBFAC,,, | Performed by: INTERNAL MEDICINE

## 2022-01-01 PROCEDURE — 96372 THER/PROPH/DIAG INJ SC/IM: CPT | Performed by: NURSE PRACTITIONER

## 2022-01-01 PROCEDURE — 83690 ASSAY OF LIPASE: CPT | Performed by: EMERGENCY MEDICINE

## 2022-01-01 PROCEDURE — 3074F PR MOST RECENT SYSTOLIC BLOOD PRESSURE < 130 MM HG: ICD-10-PCS | Mod: CPTII,S$GLB,, | Performed by: FAMILY MEDICINE

## 2022-01-01 PROCEDURE — 93000 ELECTROCARDIOGRAM COMPLETE: CPT | Mod: S$GLB,,, | Performed by: INTERNAL MEDICINE

## 2022-01-01 PROCEDURE — 1159F MED LIST DOCD IN RCRD: CPT | Mod: CPTII,95,, | Performed by: INTERNAL MEDICINE

## 2022-01-01 PROCEDURE — 99232 PR SUBSEQUENT HOSPITAL CARE,LEVL II: ICD-10-PCS | Mod: GC,,, | Performed by: INTERNAL MEDICINE

## 2022-01-01 PROCEDURE — 99497 PR ADVNCD CARE PLAN 30 MIN: ICD-10-PCS | Mod: 25,,, | Performed by: INTERNAL MEDICINE

## 2022-01-01 PROCEDURE — 1100F PR PT FALLS ASSESS DOC 2+ FALLS/FALL W/INJURY/YR: ICD-10-PCS | Mod: CPTII,S$GLB,, | Performed by: STUDENT IN AN ORGANIZED HEALTH CARE EDUCATION/TRAINING PROGRAM

## 2022-01-01 PROCEDURE — 99024 POSTOP FOLLOW-UP VISIT: CPT | Mod: S$GLB,,, | Performed by: STUDENT IN AN ORGANIZED HEALTH CARE EDUCATION/TRAINING PROGRAM

## 2022-01-01 PROCEDURE — 99214 PR OFFICE/OUTPT VISIT, EST, LEVL IV, 30-39 MIN: ICD-10-PCS | Mod: S$GLB,,, | Performed by: FAMILY MEDICINE

## 2022-01-01 PROCEDURE — 83605 ASSAY OF LACTIC ACID: CPT | Performed by: HOSPITALIST

## 2022-01-01 PROCEDURE — 99024 POSTOP FOLLOW-UP VISIT: CPT | Mod: S$GLB,,, | Performed by: CLINICAL NURSE SPECIALIST

## 2022-01-01 PROCEDURE — 1111F PR DISCHARGE MEDS RECONCILED W/ CURRENT OUTPATIENT MED LIST: ICD-10-PCS | Mod: CPTII,S$GLB,, | Performed by: STUDENT IN AN ORGANIZED HEALTH CARE EDUCATION/TRAINING PROGRAM

## 2022-01-01 PROCEDURE — 4010F ACE/ARB THERAPY RXD/TAKEN: CPT | Mod: CPTII,95,, | Performed by: INTERNAL MEDICINE

## 2022-01-01 PROCEDURE — 83036 HEMOGLOBIN GLYCOSYLATED A1C: CPT | Performed by: EMERGENCY MEDICINE

## 2022-01-01 PROCEDURE — S0030 INJECTION, METRONIDAZOLE: HCPCS | Performed by: NURSE PRACTITIONER

## 2022-01-01 PROCEDURE — 1101F PR PT FALLS ASSESS DOC 0-1 FALLS W/OUT INJ PAST YR: ICD-10-PCS | Mod: CPTII,S$GLB,, | Performed by: INTERNAL MEDICINE

## 2022-01-01 PROCEDURE — 4010F PR ACE/ARB THEARPY RXD/TAKEN: ICD-10-PCS | Mod: CPTII,S$GLB,, | Performed by: CLINICAL NURSE SPECIALIST

## 2022-01-01 PROCEDURE — 99223 1ST HOSP IP/OBS HIGH 75: CPT | Mod: AI,,, | Performed by: STUDENT IN AN ORGANIZED HEALTH CARE EDUCATION/TRAINING PROGRAM

## 2022-01-01 PROCEDURE — 99214 PR OFFICE/OUTPT VISIT, EST, LEVL IV, 30-39 MIN: ICD-10-PCS | Mod: S$GLB,,, | Performed by: STUDENT IN AN ORGANIZED HEALTH CARE EDUCATION/TRAINING PROGRAM

## 2022-01-01 PROCEDURE — 36415 COLL VENOUS BLD VENIPUNCTURE: CPT | Performed by: FAMILY MEDICINE

## 2022-01-01 PROCEDURE — 86140 C-REACTIVE PROTEIN: CPT | Performed by: STUDENT IN AN ORGANIZED HEALTH CARE EDUCATION/TRAINING PROGRAM

## 2022-01-01 PROCEDURE — 81003 URINALYSIS AUTO W/O SCOPE: CPT | Performed by: NURSE PRACTITIONER

## 2022-01-01 PROCEDURE — 99024 PR POST-OP FOLLOW-UP VISIT: ICD-10-PCS | Mod: ,,, | Performed by: SURGERY

## 2022-01-01 PROCEDURE — 1125F PR PAIN SEVERITY QUANTIFIED, PAIN PRESENT: ICD-10-PCS | Mod: CPTII,S$GLB,, | Performed by: INTERNAL MEDICINE

## 2022-01-01 PROCEDURE — 85027 COMPLETE CBC AUTOMATED: CPT | Performed by: NURSE PRACTITIONER

## 2022-01-01 PROCEDURE — 99999 PR PBB SHADOW E&M-EST. PATIENT-LVL V: ICD-10-PCS | Mod: PBBFAC,,, | Performed by: INTERNAL MEDICINE

## 2022-01-01 PROCEDURE — 93296 PR INTERROG EVAL, REMOTE, UP TO 90 DAYS, PACER/DEFIB,TECH REVIEW: ICD-10-PCS | Mod: S$GLB,,, | Performed by: INTERNAL MEDICINE

## 2022-01-01 PROCEDURE — 99999 PR PBB SHADOW E&M-EST. PATIENT-LVL V: CPT | Mod: PBBFAC,,, | Performed by: INTERNAL MEDICINE

## 2022-01-01 PROCEDURE — 99223 PR INITIAL HOSPITAL CARE,LEVL III: ICD-10-PCS | Mod: ,,, | Performed by: FAMILY MEDICINE

## 2022-01-01 PROCEDURE — 83735 ASSAY OF MAGNESIUM: CPT | Mod: 91 | Performed by: PHYSICIAN ASSISTANT

## 2022-01-01 PROCEDURE — 63600175 PHARM REV CODE 636 W HCPCS: Performed by: EMERGENCY MEDICINE

## 2022-01-01 PROCEDURE — 1125F AMNT PAIN NOTED PAIN PRSNT: CPT | Mod: CPTII,S$GLB,, | Performed by: FAMILY MEDICINE

## 2022-01-01 PROCEDURE — 99223 1ST HOSP IP/OBS HIGH 75: CPT | Mod: ,,, | Performed by: NURSE PRACTITIONER

## 2022-01-01 PROCEDURE — 96372 PR INJECTION,THERAP/PROPH/DIAG2ST, IM OR SUBCUT: ICD-10-PCS | Mod: S$GLB,,, | Performed by: INTERNAL MEDICINE

## 2022-01-01 PROCEDURE — 4010F ACE/ARB THERAPY RXD/TAKEN: CPT | Mod: CPTII,S$GLB,, | Performed by: CLINICAL NURSE SPECIALIST

## 2022-01-01 PROCEDURE — 4010F PR ACE/ARB THEARPY RXD/TAKEN: ICD-10-PCS | Mod: CPTII,S$GLB,, | Performed by: NURSE PRACTITIONER

## 2022-01-01 PROCEDURE — 1111F DSCHRG MED/CURRENT MED MERGE: CPT | Mod: CPTII,S$GLB,, | Performed by: STUDENT IN AN ORGANIZED HEALTH CARE EDUCATION/TRAINING PROGRAM

## 2022-01-01 PROCEDURE — 4010F PR ACE/ARB THEARPY RXD/TAKEN: ICD-10-PCS | Mod: CPTII,95,, | Performed by: INTERNAL MEDICINE

## 2022-01-01 PROCEDURE — 85027 COMPLETE CBC AUTOMATED: CPT | Performed by: INTERNAL MEDICINE

## 2022-01-01 PROCEDURE — 86580 TB INTRADERMAL TEST: CPT | Performed by: STUDENT IN AN ORGANIZED HEALTH CARE EDUCATION/TRAINING PROGRAM

## 2022-01-01 PROCEDURE — 86803 HEPATITIS C AB TEST: CPT | Performed by: EMERGENCY MEDICINE

## 2022-01-01 PROCEDURE — 3044F PR MOST RECENT HEMOGLOBIN A1C LEVEL <7.0%: ICD-10-PCS | Mod: CPTII,95,, | Performed by: INTERNAL MEDICINE

## 2022-01-01 PROCEDURE — 83930 ASSAY OF BLOOD OSMOLALITY: CPT | Performed by: STUDENT IN AN ORGANIZED HEALTH CARE EDUCATION/TRAINING PROGRAM

## 2022-01-01 PROCEDURE — 85007 BL SMEAR W/DIFF WBC COUNT: CPT | Performed by: NURSE PRACTITIONER

## 2022-01-01 PROCEDURE — 85027 COMPLETE CBC AUTOMATED: CPT | Performed by: STUDENT IN AN ORGANIZED HEALTH CARE EDUCATION/TRAINING PROGRAM

## 2022-01-01 PROCEDURE — 80076 HEPATIC FUNCTION PANEL: CPT | Performed by: NURSE PRACTITIONER

## 2022-01-01 PROCEDURE — 99999 PR PBB SHADOW E&M-EST. PATIENT-LVL V: CPT | Mod: PBBFAC,,, | Performed by: FAMILY MEDICINE

## 2022-01-01 PROCEDURE — 99223 PR INITIAL HOSPITAL CARE,LEVL III: ICD-10-PCS | Mod: AI,,, | Performed by: HOSPITALIST

## 2022-01-01 PROCEDURE — 80048 BASIC METABOLIC PNL TOTAL CA: CPT | Mod: 91 | Performed by: PHYSICIAN ASSISTANT

## 2022-01-01 PROCEDURE — 25000003 PHARM REV CODE 250: Performed by: EMERGENCY MEDICINE

## 2022-01-01 PROCEDURE — 81000 URINALYSIS NONAUTO W/SCOPE: CPT | Performed by: EMERGENCY MEDICINE

## 2022-01-01 PROCEDURE — 1160F RVW MEDS BY RX/DR IN RCRD: CPT | Mod: CPTII,S$GLB,, | Performed by: CLINICAL NURSE SPECIALIST

## 2022-01-01 PROCEDURE — 99226 PR SUBSEQUENT OBSERVATION CARE,LEVEL III: ICD-10-PCS | Mod: ,,, | Performed by: INTERNAL MEDICINE

## 2022-01-01 PROCEDURE — 83930 ASSAY OF BLOOD OSMOLALITY: CPT | Performed by: INTERNAL MEDICINE

## 2022-01-01 PROCEDURE — 99232 SBSQ HOSP IP/OBS MODERATE 35: CPT | Mod: ,,, | Performed by: NURSE PRACTITIONER

## 2022-01-01 PROCEDURE — 80048 BASIC METABOLIC PNL TOTAL CA: CPT | Mod: 91 | Performed by: HOSPITALIST

## 2022-01-01 PROCEDURE — 99999 PR PBB SHADOW E&M-EST. PATIENT-LVL V: ICD-10-PCS | Mod: PBBFAC,,, | Performed by: FAMILY MEDICINE

## 2022-01-01 PROCEDURE — 90832 PSYTX W PT 30 MINUTES: CPT | Mod: ,,, | Performed by: STUDENT IN AN ORGANIZED HEALTH CARE EDUCATION/TRAINING PROGRAM

## 2022-01-01 PROCEDURE — U0005 INFEC AGEN DETEC AMPLI PROBE: HCPCS | Performed by: STUDENT IN AN ORGANIZED HEALTH CARE EDUCATION/TRAINING PROGRAM

## 2022-01-01 PROCEDURE — 99999 PR PBB SHADOW E&M-EST. PATIENT-LVL III: CPT | Mod: PBBFAC,,, | Performed by: INTERNAL MEDICINE

## 2022-01-01 PROCEDURE — 99214 PR OFFICE/OUTPT VISIT, EST, LEVL IV, 30-39 MIN: ICD-10-PCS | Mod: 25,S$GLB,, | Performed by: INTERNAL MEDICINE

## 2022-01-01 PROCEDURE — U0002 COVID-19 LAB TEST NON-CDC: HCPCS | Performed by: EMERGENCY MEDICINE

## 2022-01-01 PROCEDURE — 99232 SBSQ HOSP IP/OBS MODERATE 35: CPT | Mod: ,,, | Performed by: HOSPITALIST

## 2022-01-01 PROCEDURE — 97162 PT EVAL MOD COMPLEX 30 MIN: CPT

## 2022-01-01 PROCEDURE — 85379 FIBRIN DEGRADATION QUANT: CPT | Performed by: FAMILY MEDICINE

## 2022-01-01 PROCEDURE — 84100 ASSAY OF PHOSPHORUS: CPT | Performed by: PHYSICIAN ASSISTANT

## 2022-01-01 PROCEDURE — 99499 UNLISTED E&M SERVICE: CPT | Mod: S$GLB,,, | Performed by: STUDENT IN AN ORGANIZED HEALTH CARE EDUCATION/TRAINING PROGRAM

## 2022-01-01 PROCEDURE — 83605 ASSAY OF LACTIC ACID: CPT | Mod: 91 | Performed by: EMERGENCY MEDICINE

## 2022-01-01 PROCEDURE — 99219 PR INITIAL OBSERVATION CARE,LEVL II: ICD-10-PCS | Mod: 25,,, | Performed by: INTERNAL MEDICINE

## 2022-01-01 PROCEDURE — 3074F SYST BP LT 130 MM HG: CPT | Mod: CPTII,S$GLB,, | Performed by: STUDENT IN AN ORGANIZED HEALTH CARE EDUCATION/TRAINING PROGRAM

## 2022-01-01 PROCEDURE — 99999 PR PBB SHADOW E&M-EST. PATIENT-LVL IV: CPT | Mod: PBBFAC,,, | Performed by: INTERNAL MEDICINE

## 2022-01-01 PROCEDURE — 93000 EKG 12-LEAD: ICD-10-PCS | Mod: S$GLB,,, | Performed by: INTERNAL MEDICINE

## 2022-01-01 PROCEDURE — 83880 ASSAY OF NATRIURETIC PEPTIDE: CPT | Performed by: STUDENT IN AN ORGANIZED HEALTH CARE EDUCATION/TRAINING PROGRAM

## 2022-01-01 PROCEDURE — 84550 ASSAY OF BLOOD/URIC ACID: CPT | Performed by: NURSE PRACTITIONER

## 2022-01-01 PROCEDURE — 1160F RVW MEDS BY RX/DR IN RCRD: CPT | Mod: CPTII,S$GLB,, | Performed by: NURSE PRACTITIONER

## 2022-01-01 PROCEDURE — 99999 PR PBB SHADOW E&M-EST. PATIENT-LVL III: ICD-10-PCS | Mod: PBBFAC,,, | Performed by: STUDENT IN AN ORGANIZED HEALTH CARE EDUCATION/TRAINING PROGRAM

## 2022-01-01 PROCEDURE — 84484 ASSAY OF TROPONIN QUANT: CPT | Performed by: EMERGENCY MEDICINE

## 2022-01-01 PROCEDURE — 97166 OT EVAL MOD COMPLEX 45 MIN: CPT

## 2022-01-01 PROCEDURE — 99212 OFFICE O/P EST SF 10 MIN: CPT | Mod: GC,,, | Performed by: INTERNAL MEDICINE

## 2022-01-01 PROCEDURE — 99024 PR POST-OP FOLLOW-UP VISIT: ICD-10-PCS | Mod: S$GLB,,, | Performed by: CLINICAL NURSE SPECIALIST

## 2022-01-01 PROCEDURE — 1159F PR MEDICATION LIST DOCUMENTED IN MEDICAL RECORD: ICD-10-PCS | Mod: CPTII,S$GLB,, | Performed by: CLINICAL NURSE SPECIALIST

## 2022-01-01 PROCEDURE — 87088 URINE BACTERIA CULTURE: CPT | Performed by: PHYSICIAN ASSISTANT

## 2022-01-01 PROCEDURE — 99232 PR SUBSEQUENT HOSPITAL CARE,LEVL II: ICD-10-PCS | Mod: GC,,, | Performed by: HOSPITALIST

## 2022-01-01 PROCEDURE — 1160F PR REVIEW ALL MEDS BY PRESCRIBER/CLIN PHARMACIST DOCUMENTED: ICD-10-PCS | Mod: CPTII,S$GLB,, | Performed by: CLINICAL NURSE SPECIALIST

## 2022-01-01 PROCEDURE — 99233 SBSQ HOSP IP/OBS HIGH 50: CPT | Mod: ,,, | Performed by: COLON & RECTAL SURGERY

## 2022-01-01 PROCEDURE — 99220 PR INITIAL OBSERVATION CARE,LEVL III: CPT | Mod: ,,, | Performed by: NURSE PRACTITIONER

## 2022-01-01 PROCEDURE — 99285 EMERGENCY DEPT VISIT HI MDM: CPT | Mod: ,,, | Performed by: EMERGENCY MEDICINE

## 2022-01-01 PROCEDURE — 86901 BLOOD TYPING SEROLOGIC RH(D): CPT | Performed by: EMERGENCY MEDICINE

## 2022-01-01 PROCEDURE — 96365 THER/PROPH/DIAG IV INF INIT: CPT | Mod: 59

## 2022-01-01 PROCEDURE — 99999 PR PBB SHADOW E&M-EST. PATIENT-LVL III: CPT | Mod: PBBFAC,,, | Performed by: STUDENT IN AN ORGANIZED HEALTH CARE EDUCATION/TRAINING PROGRAM

## 2022-01-01 PROCEDURE — 1160F PR REVIEW ALL MEDS BY PRESCRIBER/CLIN PHARMACIST DOCUMENTED: ICD-10-PCS | Mod: CPTII,S$GLB,, | Performed by: NURSE PRACTITIONER

## 2022-01-01 PROCEDURE — 84484 ASSAY OF TROPONIN QUANT: CPT | Performed by: HOSPITALIST

## 2022-01-01 PROCEDURE — 83935 ASSAY OF URINE OSMOLALITY: CPT | Performed by: INTERNAL MEDICINE

## 2022-01-01 PROCEDURE — 84134 ASSAY OF PREALBUMIN: CPT | Performed by: STUDENT IN AN ORGANIZED HEALTH CARE EDUCATION/TRAINING PROGRAM

## 2022-01-01 PROCEDURE — 4010F ACE/ARB THERAPY RXD/TAKEN: CPT | Mod: CPTII,S$GLB,, | Performed by: FAMILY MEDICINE

## 2022-01-01 PROCEDURE — 99024 PR POST-OP FOLLOW-UP VISIT: ICD-10-PCS | Mod: S$GLB,,, | Performed by: STUDENT IN AN ORGANIZED HEALTH CARE EDUCATION/TRAINING PROGRAM

## 2022-01-01 PROCEDURE — 99497 ADVNCD CARE PLAN 30 MIN: CPT | Mod: 25,,, | Performed by: INTERNAL MEDICINE

## 2022-01-01 PROCEDURE — 82533 TOTAL CORTISOL: CPT | Mod: 91 | Performed by: STUDENT IN AN ORGANIZED HEALTH CARE EDUCATION/TRAINING PROGRAM

## 2022-01-01 PROCEDURE — 99999 PR PBB SHADOW E&M-EST. PATIENT-LVL III: ICD-10-PCS | Mod: PBBFAC,,, | Performed by: CLINICAL NURSE SPECIALIST

## 2022-01-01 PROCEDURE — 93296 REM INTERROG EVL PM/IDS: CPT | Mod: S$GLB,,, | Performed by: INTERNAL MEDICINE

## 2022-01-01 PROCEDURE — 3074F SYST BP LT 130 MM HG: CPT | Mod: CPTII,S$GLB,, | Performed by: FAMILY MEDICINE

## 2022-01-01 PROCEDURE — 63600175 PHARM REV CODE 636 W HCPCS: Mod: JG

## 2022-01-01 PROCEDURE — 3074F PR MOST RECENT SYSTOLIC BLOOD PRESSURE < 130 MM HG: ICD-10-PCS | Mod: CPTII,S$GLB,, | Performed by: STUDENT IN AN ORGANIZED HEALTH CARE EDUCATION/TRAINING PROGRAM

## 2022-01-01 PROCEDURE — 84466 ASSAY OF TRANSFERRIN: CPT | Performed by: NURSE PRACTITIONER

## 2022-01-01 PROCEDURE — 96366 THER/PROPH/DIAG IV INF ADDON: CPT

## 2022-01-01 PROCEDURE — 1101F PR PT FALLS ASSESS DOC 0-1 FALLS W/OUT INJ PAST YR: ICD-10-PCS | Mod: CPTII,S$GLB,, | Performed by: FAMILY MEDICINE

## 2022-01-01 PROCEDURE — 3288F PR FALLS RISK ASSESSMENT DOCUMENTED: ICD-10-PCS | Mod: CPTII,S$GLB,, | Performed by: FAMILY MEDICINE

## 2022-01-01 PROCEDURE — 3044F PR MOST RECENT HEMOGLOBIN A1C LEVEL <7.0%: ICD-10-PCS | Mod: CPTII,S$GLB,, | Performed by: STUDENT IN AN ORGANIZED HEALTH CARE EDUCATION/TRAINING PROGRAM

## 2022-01-01 PROCEDURE — 1159F MED LIST DOCD IN RCRD: CPT | Mod: CPTII,S$GLB,, | Performed by: NURSE PRACTITIONER

## 2022-01-01 PROCEDURE — 99223 PR INITIAL HOSPITAL CARE,LEVL III: ICD-10-PCS | Mod: AI,,, | Performed by: INTERNAL MEDICINE

## 2022-01-01 PROCEDURE — 1160F PR REVIEW ALL MEDS BY PRESCRIBER/CLIN PHARMACIST DOCUMENTED: ICD-10-PCS | Mod: CPTII,95,, | Performed by: INTERNAL MEDICINE

## 2022-01-01 PROCEDURE — 99219 PR INITIAL OBSERVATION CARE,LEVL II: CPT | Mod: 25,,, | Performed by: INTERNAL MEDICINE

## 2022-01-01 PROCEDURE — 84145 PROCALCITONIN (PCT): CPT | Performed by: STUDENT IN AN ORGANIZED HEALTH CARE EDUCATION/TRAINING PROGRAM

## 2022-01-01 PROCEDURE — 1101F PT FALLS ASSESS-DOCD LE1/YR: CPT | Mod: CPTII,S$GLB,, | Performed by: FAMILY MEDICINE

## 2022-01-01 PROCEDURE — 63600175 PHARM REV CODE 636 W HCPCS

## 2022-01-01 PROCEDURE — 81000 URINALYSIS NONAUTO W/SCOPE: CPT | Performed by: INTERNAL MEDICINE

## 2022-01-01 PROCEDURE — 83605 ASSAY OF LACTIC ACID: CPT | Performed by: FAMILY MEDICINE

## 2022-01-01 PROCEDURE — 85027 COMPLETE CBC AUTOMATED: CPT | Performed by: HOSPITALIST

## 2022-01-01 PROCEDURE — 99213 PR OFFICE/OUTPT VISIT, EST, LEVL III, 20-29 MIN: ICD-10-PCS | Mod: S$GLB,,, | Performed by: STUDENT IN AN ORGANIZED HEALTH CARE EDUCATION/TRAINING PROGRAM

## 2022-01-01 PROCEDURE — 1126F PR PAIN SEVERITY QUANTIFIED, NO PAIN PRESENT: ICD-10-PCS | Mod: CPTII,S$GLB,, | Performed by: INTERNAL MEDICINE

## 2022-01-01 PROCEDURE — 99443 PR PHYSICIAN TELEPHONE EVALUATION 21-30 MIN: ICD-10-PCS | Mod: 95,,, | Performed by: INTERNAL MEDICINE

## 2022-01-01 PROCEDURE — 84134 ASSAY OF PREALBUMIN: CPT | Performed by: HOSPITALIST

## 2022-01-01 PROCEDURE — 99223 1ST HOSP IP/OBS HIGH 75: CPT | Mod: ,,, | Performed by: FAMILY MEDICINE

## 2022-01-01 PROCEDURE — 3078F DIAST BP <80 MM HG: CPT | Mod: CPTII,S$GLB,, | Performed by: STUDENT IN AN ORGANIZED HEALTH CARE EDUCATION/TRAINING PROGRAM

## 2022-01-01 PROCEDURE — 99220 PR INITIAL OBSERVATION CARE,LEVL III: ICD-10-PCS | Mod: ,,, | Performed by: NURSE PRACTITIONER

## 2022-01-01 PROCEDURE — 99233 SBSQ HOSP IP/OBS HIGH 50: CPT | Mod: ,,, | Performed by: NURSE PRACTITIONER

## 2022-01-01 PROCEDURE — 82570 ASSAY OF URINE CREATININE: CPT | Performed by: NURSE PRACTITIONER

## 2022-01-01 PROCEDURE — 99499 RISK ADDL DX/OHS AUDIT: ICD-10-PCS | Mod: S$GLB,,, | Performed by: FAMILY MEDICINE

## 2022-01-01 PROCEDURE — 84300 ASSAY OF URINE SODIUM: CPT | Performed by: STUDENT IN AN ORGANIZED HEALTH CARE EDUCATION/TRAINING PROGRAM

## 2022-01-01 PROCEDURE — 86901 BLOOD TYPING SEROLOGIC RH(D): CPT | Mod: 91 | Performed by: PHYSICIAN ASSISTANT

## 2022-01-01 PROCEDURE — 99220 PR INITIAL OBSERVATION CARE,LEVL III: CPT | Mod: ,,, | Performed by: FAMILY MEDICINE

## 2022-01-01 PROCEDURE — 84300 ASSAY OF URINE SODIUM: CPT | Performed by: INTERNAL MEDICINE

## 2022-01-01 PROCEDURE — 82728 ASSAY OF FERRITIN: CPT | Performed by: NURSE PRACTITIONER

## 2022-01-01 PROCEDURE — 3288F FALL RISK ASSESSMENT DOCD: CPT | Mod: CPTII,S$GLB,, | Performed by: FAMILY MEDICINE

## 2022-01-01 PROCEDURE — 3044F PR MOST RECENT HEMOGLOBIN A1C LEVEL <7.0%: ICD-10-PCS | Mod: CPTII,S$GLB,, | Performed by: FAMILY MEDICINE

## 2022-01-01 PROCEDURE — 36410 VNPNXR 3YR/> PHY/QHP DX/THER: CPT

## 2022-01-01 PROCEDURE — 80202 ASSAY OF VANCOMYCIN: CPT | Performed by: INTERNAL MEDICINE

## 2022-01-01 PROCEDURE — 3078F PR MOST RECENT DIASTOLIC BLOOD PRESSURE < 80 MM HG: ICD-10-PCS | Mod: CPTII,S$GLB,, | Performed by: FAMILY MEDICINE

## 2022-01-01 PROCEDURE — 4010F PR ACE/ARB THEARPY RXD/TAKEN: ICD-10-PCS | Mod: CPTII,S$GLB,, | Performed by: FAMILY MEDICINE

## 2022-01-01 PROCEDURE — 99214 OFFICE O/P EST MOD 30 MIN: CPT | Mod: S$GLB,,, | Performed by: FAMILY MEDICINE

## 2022-01-01 PROCEDURE — 84484 ASSAY OF TROPONIN QUANT: CPT | Performed by: NURSE PRACTITIONER

## 2022-01-01 PROCEDURE — 99999 PR PBB SHADOW E&M-EST. PATIENT-LVL III: CPT | Mod: PBBFAC,,, | Performed by: CLINICAL NURSE SPECIALIST

## 2022-01-01 PROCEDURE — 3044F HG A1C LEVEL LT 7.0%: CPT | Mod: CPTII,95,, | Performed by: INTERNAL MEDICINE

## 2022-01-01 PROCEDURE — 1111F PR DISCHARGE MEDS RECONCILED W/ CURRENT OUTPATIENT MED LIST: ICD-10-PCS | Mod: CPTII,S$GLB,, | Performed by: FAMILY MEDICINE

## 2022-01-01 PROCEDURE — 86580 TB INTRADERMAL TEST: CPT | Performed by: HOSPITALIST

## 2022-01-01 PROCEDURE — 80202 ASSAY OF VANCOMYCIN: CPT | Performed by: STUDENT IN AN ORGANIZED HEALTH CARE EDUCATION/TRAINING PROGRAM

## 2022-01-01 PROCEDURE — 84100 ASSAY OF PHOSPHORUS: CPT | Performed by: EMERGENCY MEDICINE

## 2022-01-01 PROCEDURE — 99226 PR SUBSEQUENT OBSERVATION CARE,LEVEL III: CPT | Mod: ,,, | Performed by: INTERNAL MEDICINE

## 2022-01-01 PROCEDURE — 99226 PR SUBSEQUENT OBSERVATION CARE,LEVEL III: ICD-10-PCS | Mod: ,,, | Performed by: FAMILY MEDICINE

## 2022-01-01 PROCEDURE — 1125F PR PAIN SEVERITY QUANTIFIED, PAIN PRESENT: ICD-10-PCS | Mod: CPTII,S$GLB,, | Performed by: FAMILY MEDICINE

## 2022-01-01 PROCEDURE — 1126F AMNT PAIN NOTED NONE PRSNT: CPT | Mod: CPTII,S$GLB,, | Performed by: INTERNAL MEDICINE

## 2022-01-01 PROCEDURE — 85025 COMPLETE CBC W/AUTO DIFF WBC: CPT | Performed by: PHYSICIAN ASSISTANT

## 2022-01-01 PROCEDURE — 80053 COMPREHEN METABOLIC PANEL: CPT | Performed by: PHYSICIAN ASSISTANT

## 2022-01-01 PROCEDURE — 99239 PR HOSPITAL DISCHARGE DAY,>30 MIN: ICD-10-PCS | Mod: ,,, | Performed by: HOSPITALIST

## 2022-01-01 PROCEDURE — 99999 PR PBB SHADOW E&M-EST. PATIENT-LVL II: ICD-10-PCS | Mod: PBBFAC,,, | Performed by: CLINICAL NURSE SPECIALIST

## 2022-01-01 PROCEDURE — 97530 THERAPEUTIC ACTIVITIES: CPT | Mod: CQ

## 2022-01-01 PROCEDURE — 99499 UNLISTED E&M SERVICE: CPT | Mod: S$GLB,,, | Performed by: FAMILY MEDICINE

## 2022-01-01 PROCEDURE — 36415 COLL VENOUS BLD VENIPUNCTURE: CPT | Performed by: SURGERY

## 2022-01-01 PROCEDURE — 92526 ORAL FUNCTION THERAPY: CPT

## 2022-01-01 PROCEDURE — 85025 COMPLETE CBC W/AUTO DIFF WBC: CPT | Performed by: NURSE PRACTITIONER

## 2022-01-01 PROCEDURE — 99443 PR PHYSICIAN TELEPHONE EVALUATION 21-30 MIN: CPT | Mod: 95,,, | Performed by: INTERNAL MEDICINE

## 2022-01-01 PROCEDURE — 80048 BASIC METABOLIC PNL TOTAL CA: CPT | Performed by: SURGERY

## 2022-01-01 PROCEDURE — 99214 OFFICE O/P EST MOD 30 MIN: CPT | Mod: S$GLB,,, | Performed by: STUDENT IN AN ORGANIZED HEALTH CARE EDUCATION/TRAINING PROGRAM

## 2022-01-01 PROCEDURE — 96372 THER/PROPH/DIAG INJ SC/IM: CPT | Mod: S$GLB,,, | Performed by: INTERNAL MEDICINE

## 2022-01-01 PROCEDURE — 99214 OFFICE O/P EST MOD 30 MIN: CPT | Mod: S$GLB,,, | Performed by: NURSE PRACTITIONER

## 2022-01-01 PROCEDURE — 1159F PR MEDICATION LIST DOCUMENTED IN MEDICAL RECORD: ICD-10-PCS | Mod: CPTII,95,, | Performed by: INTERNAL MEDICINE

## 2022-01-01 PROCEDURE — 4010F ACE/ARB THERAPY RXD/TAKEN: CPT | Mod: CPTII,S$GLB,, | Performed by: NURSE PRACTITIONER

## 2022-01-01 PROCEDURE — 99220 PR INITIAL OBSERVATION CARE,LEVL III: ICD-10-PCS | Mod: ,,, | Performed by: FAMILY MEDICINE

## 2022-01-01 PROCEDURE — 99212 PR OFFICE/OUTPT VISIT, EST, LEVL II, 10-19 MIN: ICD-10-PCS | Mod: GC,,, | Performed by: INTERNAL MEDICINE

## 2022-01-01 PROCEDURE — 82746 ASSAY OF FOLIC ACID SERUM: CPT | Performed by: NURSE PRACTITIONER

## 2022-01-01 PROCEDURE — 3044F PR MOST RECENT HEMOGLOBIN A1C LEVEL <7.0%: ICD-10-PCS | Mod: CPTII,S$GLB,, | Performed by: NURSE PRACTITIONER

## 2022-01-01 RX ORDER — ONDANSETRON 2 MG/ML
8 INJECTION INTRAMUSCULAR; INTRAVENOUS EVERY 8 HOURS PRN
Status: DISCONTINUED | OUTPATIENT
Start: 2022-01-01 | End: 2022-01-01 | Stop reason: HOSPADM

## 2022-01-01 RX ORDER — CLOPIDOGREL BISULFATE 75 MG/1
75 TABLET ORAL DAILY
Qty: 30 TABLET | Refills: 1 | Status: SHIPPED | OUTPATIENT
Start: 2022-01-01 | End: 2022-01-01 | Stop reason: SDUPTHER

## 2022-01-01 RX ORDER — ALBUTEROL SULFATE 90 UG/1
2 AEROSOL, METERED RESPIRATORY (INHALATION) EVERY 6 HOURS PRN
Qty: 18 G | Refills: 1 | Status: SHIPPED | OUTPATIENT
Start: 2022-01-01 | End: 2023-08-11

## 2022-01-01 RX ORDER — FUROSEMIDE 40 MG/1
40 TABLET ORAL 2 TIMES DAILY
Qty: 60 TABLET | Refills: 1 | Status: SHIPPED | OUTPATIENT
Start: 2022-01-01 | End: 2022-01-01 | Stop reason: SDUPTHER

## 2022-01-01 RX ORDER — ALPRAZOLAM 0.5 MG/1
0.5 TABLET ORAL NIGHTLY PRN
Status: DISCONTINUED | OUTPATIENT
Start: 2022-01-01 | End: 2022-01-01 | Stop reason: HOSPADM

## 2022-01-01 RX ORDER — FUROSEMIDE 20 MG/1
20 TABLET ORAL DAILY
Status: DISCONTINUED | OUTPATIENT
Start: 2022-01-01 | End: 2022-01-01 | Stop reason: HOSPADM

## 2022-01-01 RX ORDER — CLOPIDOGREL BISULFATE 75 MG/1
75 TABLET ORAL DAILY
Status: DISCONTINUED | OUTPATIENT
Start: 2022-01-01 | End: 2022-01-01 | Stop reason: HOSPADM

## 2022-01-01 RX ORDER — HYDROCODONE BITARTRATE AND ACETAMINOPHEN 5; 325 MG/1; MG/1
1 TABLET ORAL EVERY 12 HOURS PRN
Qty: 10 TABLET | Refills: 0 | Status: SHIPPED | OUTPATIENT
Start: 2022-01-01 | End: 2022-01-01 | Stop reason: SDUPTHER

## 2022-01-01 RX ORDER — OXYCODONE HYDROCHLORIDE 5 MG/1
5 TABLET ORAL EVERY 6 HOURS PRN
Status: DISCONTINUED | OUTPATIENT
Start: 2022-01-01 | End: 2022-01-01 | Stop reason: HOSPADM

## 2022-01-01 RX ORDER — FUROSEMIDE 20 MG/1
20 TABLET ORAL DAILY
Status: DISCONTINUED | OUTPATIENT
Start: 2022-01-01 | End: 2022-01-01

## 2022-01-01 RX ORDER — FUROSEMIDE 10 MG/ML
20 INJECTION INTRAMUSCULAR; INTRAVENOUS ONCE
Status: COMPLETED | OUTPATIENT
Start: 2022-01-01 | End: 2022-01-01

## 2022-01-01 RX ORDER — DOXYCYCLINE HYCLATE 100 MG
100 TABLET ORAL EVERY 12 HOURS
Status: DISCONTINUED | OUTPATIENT
Start: 2022-01-01 | End: 2022-01-01 | Stop reason: HOSPADM

## 2022-01-01 RX ORDER — CARVEDILOL 3.12 MG/1
3.12 TABLET ORAL 2 TIMES DAILY
Status: DISCONTINUED | OUTPATIENT
Start: 2022-01-01 | End: 2022-01-01

## 2022-01-01 RX ORDER — MAGNESIUM SULFATE HEPTAHYDRATE 40 MG/ML
2 INJECTION, SOLUTION INTRAVENOUS ONCE
Status: COMPLETED | OUTPATIENT
Start: 2022-01-01 | End: 2022-01-01

## 2022-01-01 RX ORDER — HYDROCODONE BITARTRATE AND ACETAMINOPHEN 5; 325 MG/1; MG/1
1 TABLET ORAL EVERY 6 HOURS PRN
Status: DISCONTINUED | OUTPATIENT
Start: 2022-01-01 | End: 2022-01-01 | Stop reason: HOSPADM

## 2022-01-01 RX ORDER — CARVEDILOL 6.25 MG/1
6.25 TABLET ORAL 2 TIMES DAILY WITH MEALS
Status: DISCONTINUED | OUTPATIENT
Start: 2022-01-01 | End: 2022-01-01

## 2022-01-01 RX ORDER — HYDROCODONE BITARTRATE AND ACETAMINOPHEN 5; 325 MG/1; MG/1
1 TABLET ORAL EVERY 12 HOURS PRN
Qty: 16 TABLET | Refills: 0 | Status: ON HOLD | OUTPATIENT
Start: 2022-01-01 | End: 2022-01-01 | Stop reason: HOSPADM

## 2022-01-01 RX ORDER — CARVEDILOL 3.12 MG/1
3.12 TABLET ORAL 2 TIMES DAILY WITH MEALS
Qty: 60 TABLET | Refills: 0 | Status: ON HOLD
Start: 2022-01-01 | End: 2022-01-01 | Stop reason: SDUPTHER

## 2022-01-01 RX ORDER — ACETAMINOPHEN 325 MG/1
650 TABLET ORAL EVERY 6 HOURS PRN
Status: DISCONTINUED | OUTPATIENT
Start: 2022-01-01 | End: 2022-01-01 | Stop reason: HOSPADM

## 2022-01-01 RX ORDER — POLYETHYLENE GLYCOL 3350 17 G/17G
17 POWDER, FOR SOLUTION ORAL DAILY
Status: DISCONTINUED | OUTPATIENT
Start: 2022-01-01 | End: 2022-01-01 | Stop reason: HOSPADM

## 2022-01-01 RX ORDER — DOXYCYCLINE 100 MG/1
100 CAPSULE ORAL EVERY 12 HOURS
Qty: 14 CAPSULE | Refills: 0 | Status: SHIPPED | OUTPATIENT
Start: 2022-01-01 | End: 2022-01-01

## 2022-01-01 RX ORDER — POLYETHYLENE GLYCOL 3350 17 G/17G
17 POWDER, FOR SOLUTION ORAL 2 TIMES DAILY
Status: DISCONTINUED | OUTPATIENT
Start: 2022-01-01 | End: 2022-01-01 | Stop reason: HOSPADM

## 2022-01-01 RX ORDER — SODIUM CHLORIDE 9 MG/ML
INJECTION, SOLUTION INTRAVENOUS CONTINUOUS
Status: DISCONTINUED | OUTPATIENT
Start: 2022-01-01 | End: 2022-01-01

## 2022-01-01 RX ORDER — POLYETHYLENE GLYCOL 3350 17 G/17G
17 POWDER, FOR SOLUTION ORAL ONCE
Status: COMPLETED | OUTPATIENT
Start: 2022-01-01 | End: 2022-01-01

## 2022-01-01 RX ORDER — METOPROLOL SUCCINATE 25 MG/1
25 TABLET, EXTENDED RELEASE ORAL DAILY
Qty: 30 TABLET | Refills: 1 | Status: SHIPPED | OUTPATIENT
Start: 2022-01-01 | End: 2022-01-01 | Stop reason: SDUPTHER

## 2022-01-01 RX ORDER — ADHESIVE BANDAGE
30 BANDAGE TOPICAL ONCE
Status: COMPLETED | OUTPATIENT
Start: 2022-01-01 | End: 2022-01-01

## 2022-01-01 RX ORDER — EZETIMIBE 10 MG/1
10 TABLET ORAL NIGHTLY
Qty: 90 TABLET | Refills: 3 | Status: SHIPPED | OUTPATIENT
Start: 2022-01-01 | End: 2022-01-01 | Stop reason: SDUPTHER

## 2022-01-01 RX ORDER — LOSARTAN POTASSIUM 25 MG/1
25 TABLET ORAL DAILY
Status: DISCONTINUED | OUTPATIENT
Start: 2022-01-01 | End: 2022-01-01 | Stop reason: HOSPADM

## 2022-01-01 RX ORDER — LANOLIN ALCOHOL/MO/W.PET/CERES
400 CREAM (GRAM) TOPICAL DAILY
Status: DISCONTINUED | OUTPATIENT
Start: 2022-01-01 | End: 2022-01-01 | Stop reason: HOSPADM

## 2022-01-01 RX ORDER — TALC
6 POWDER (GRAM) TOPICAL NIGHTLY PRN
Status: DISCONTINUED | OUTPATIENT
Start: 2022-01-01 | End: 2022-01-01 | Stop reason: HOSPADM

## 2022-01-01 RX ORDER — SIMETHICONE 80 MG
2 TABLET,CHEWABLE ORAL EVERY 8 HOURS PRN
Status: DISCONTINUED | OUTPATIENT
Start: 2022-01-01 | End: 2022-01-01 | Stop reason: HOSPADM

## 2022-01-01 RX ORDER — ONDANSETRON 4 MG/1
4 TABLET, ORALLY DISINTEGRATING ORAL EVERY 6 HOURS PRN
Status: COMPLETED | OUTPATIENT
Start: 2022-01-01 | End: 2022-01-01

## 2022-01-01 RX ORDER — ONDANSETRON 4 MG/1
4 TABLET, ORALLY DISINTEGRATING ORAL
Status: COMPLETED | OUTPATIENT
Start: 2022-01-01 | End: 2022-01-01

## 2022-01-01 RX ORDER — TRAMADOL HYDROCHLORIDE 50 MG/1
50 TABLET ORAL EVERY 6 HOURS PRN
Start: 2022-01-01 | End: 2022-01-01 | Stop reason: SDUPTHER

## 2022-01-01 RX ORDER — POLYETHYLENE GLYCOL 3350 17 G/17G
17 POWDER, FOR SOLUTION ORAL DAILY
Qty: 510 G | Refills: 1 | Status: SHIPPED | OUTPATIENT
Start: 2022-01-01 | End: 2022-01-01 | Stop reason: SDUPTHER

## 2022-01-01 RX ORDER — SODIUM CHLORIDE 0.9 % (FLUSH) 0.9 %
10 SYRINGE (ML) INJECTION
Status: DISCONTINUED | OUTPATIENT
Start: 2022-01-01 | End: 2022-01-01 | Stop reason: HOSPADM

## 2022-01-01 RX ORDER — NALOXONE HCL 0.4 MG/ML
0.02 VIAL (ML) INJECTION
Status: DISCONTINUED | OUTPATIENT
Start: 2022-01-01 | End: 2022-01-01 | Stop reason: HOSPADM

## 2022-01-01 RX ORDER — FEEDING PUMP
240 EACH MISCELLANEOUS 2 TIMES DAILY
Qty: 60 EACH | Refills: 3 | Status: SHIPPED | OUTPATIENT
Start: 2022-01-01

## 2022-01-01 RX ORDER — TOLVAPTAN 30 MG/1
30 TABLET ORAL
Status: DISCONTINUED | OUTPATIENT
Start: 2022-01-01 | End: 2022-01-01 | Stop reason: HOSPADM

## 2022-01-01 RX ORDER — CARVEDILOL 3.12 MG/1
3.12 TABLET ORAL 2 TIMES DAILY WITH MEALS
Qty: 60 TABLET | Refills: 1 | Status: ON HOLD | OUTPATIENT
Start: 2022-01-01 | End: 2022-01-01 | Stop reason: HOSPADM

## 2022-01-01 RX ORDER — PRASUGREL 10 MG/1
10 TABLET, FILM COATED ORAL DAILY
Status: DISCONTINUED | OUTPATIENT
Start: 2022-01-01 | End: 2022-01-01

## 2022-01-01 RX ORDER — CLOPIDOGREL BISULFATE 75 MG/1
75 TABLET ORAL DAILY
Qty: 90 TABLET | Refills: 3 | Status: SHIPPED | OUTPATIENT
Start: 2022-01-01

## 2022-01-01 RX ORDER — POTASSIUM CHLORIDE 20 MEQ/1
40 TABLET, EXTENDED RELEASE ORAL ONCE
Status: COMPLETED | OUTPATIENT
Start: 2022-01-01 | End: 2022-01-01

## 2022-01-01 RX ORDER — ALPRAZOLAM 0.5 MG/1
0.5 TABLET ORAL NIGHTLY PRN
Qty: 30 TABLET | Refills: 1 | Status: SHIPPED | OUTPATIENT
Start: 2022-01-01 | End: 2023-01-01 | Stop reason: SDUPTHER

## 2022-01-01 RX ORDER — MORPHINE SULFATE 2 MG/ML
2 INJECTION, SOLUTION INTRAMUSCULAR; INTRAVENOUS
Status: COMPLETED | OUTPATIENT
Start: 2022-01-01 | End: 2022-01-01

## 2022-01-01 RX ORDER — CARVEDILOL 3.12 MG/1
3.12 TABLET ORAL 2 TIMES DAILY WITH MEALS
Qty: 60 TABLET | Refills: 1 | Status: SHIPPED | OUTPATIENT
Start: 2022-01-01 | End: 2022-01-01 | Stop reason: SDUPTHER

## 2022-01-01 RX ORDER — ASPIRIN 81 MG/1
81 TABLET ORAL DAILY
Status: DISCONTINUED | OUTPATIENT
Start: 2022-01-01 | End: 2022-01-01 | Stop reason: HOSPADM

## 2022-01-01 RX ORDER — SIMETHICONE 80 MG
80 TABLET,CHEWABLE ORAL EVERY 8 HOURS PRN
Qty: 120 TABLET | Refills: 5 | Status: SHIPPED | OUTPATIENT
Start: 2022-01-01

## 2022-01-01 RX ORDER — IPRATROPIUM BROMIDE AND ALBUTEROL SULFATE 2.5; .5 MG/3ML; MG/3ML
3 SOLUTION RESPIRATORY (INHALATION) EVERY 4 HOURS PRN
Qty: 90 ML | Refills: 0 | Status: ON HOLD | OUTPATIENT
Start: 2022-01-01 | End: 2022-01-01 | Stop reason: CLARIF

## 2022-01-01 RX ORDER — FUROSEMIDE 40 MG/1
40 TABLET ORAL 2 TIMES DAILY
Qty: 60 TABLET | Refills: 11 | Status: SHIPPED | OUTPATIENT
Start: 2022-01-01 | End: 2022-01-01

## 2022-01-01 RX ORDER — ONDANSETRON 2 MG/ML
4 INJECTION INTRAMUSCULAR; INTRAVENOUS EVERY 8 HOURS PRN
Status: DISCONTINUED | OUTPATIENT
Start: 2022-01-01 | End: 2022-01-01

## 2022-01-01 RX ORDER — ONDANSETRON 2 MG/ML
4 INJECTION INTRAMUSCULAR; INTRAVENOUS EVERY 6 HOURS PRN
Status: DISCONTINUED | OUTPATIENT
Start: 2022-01-01 | End: 2022-01-01

## 2022-01-01 RX ORDER — ESOMEPRAZOLE MAGNESIUM 40 MG/1
40 CAPSULE, DELAYED RELEASE ORAL
Qty: 30 CAPSULE | Refills: 11 | Status: ON HOLD | OUTPATIENT
Start: 2022-01-01 | End: 2022-01-01 | Stop reason: HOSPADM

## 2022-01-01 RX ORDER — DOCUSATE SODIUM 100 MG/1
100 CAPSULE, LIQUID FILLED ORAL 2 TIMES DAILY
Qty: 60 CAPSULE | Refills: 1 | Status: ON HOLD | OUTPATIENT
Start: 2022-01-01 | End: 2023-01-01 | Stop reason: HOSPADM

## 2022-01-01 RX ORDER — SODIUM,POTASSIUM PHOSPHATES 280-250MG
2 POWDER IN PACKET (EA) ORAL EVERY 4 HOURS
Status: COMPLETED | OUTPATIENT
Start: 2022-01-01 | End: 2022-01-01

## 2022-01-01 RX ORDER — SPIRONOLACTONE 25 MG/1
25 TABLET ORAL DAILY
Status: DISCONTINUED | OUTPATIENT
Start: 2022-01-01 | End: 2022-01-01

## 2022-01-01 RX ORDER — ALPRAZOLAM 0.25 MG/1
0.25 TABLET ORAL 2 TIMES DAILY PRN
Status: DISCONTINUED | OUTPATIENT
Start: 2022-01-01 | End: 2022-01-01 | Stop reason: HOSPADM

## 2022-01-01 RX ORDER — IPRATROPIUM BROMIDE AND ALBUTEROL SULFATE 2.5; .5 MG/3ML; MG/3ML
3 SOLUTION RESPIRATORY (INHALATION) EVERY 6 HOURS PRN
Status: DISCONTINUED | OUTPATIENT
Start: 2022-01-01 | End: 2022-01-01

## 2022-01-01 RX ORDER — SIMETHICONE 80 MG
80 TABLET,CHEWABLE ORAL EVERY 8 HOURS PRN
Status: DISCONTINUED | OUTPATIENT
Start: 2022-01-01 | End: 2022-01-01

## 2022-01-01 RX ORDER — CALCIUM CARBONATE 200(500)MG
500 TABLET,CHEWABLE ORAL ONCE
Status: COMPLETED | OUTPATIENT
Start: 2022-01-01 | End: 2022-01-01

## 2022-01-01 RX ORDER — FUROSEMIDE 10 MG/ML
80 INJECTION INTRAMUSCULAR; INTRAVENOUS 2 TIMES DAILY
Status: DISCONTINUED | OUTPATIENT
Start: 2022-01-01 | End: 2022-01-01

## 2022-01-01 RX ORDER — TOLVAPTAN 30 MG/1
30 TABLET ORAL
Status: DISCONTINUED | OUTPATIENT
Start: 2022-01-01 | End: 2022-01-01

## 2022-01-01 RX ORDER — POTASSIUM CHLORIDE 20 MEQ/1
20 TABLET, EXTENDED RELEASE ORAL ONCE
Status: COMPLETED | OUTPATIENT
Start: 2022-01-01 | End: 2022-01-01

## 2022-01-01 RX ORDER — MIDODRINE HYDROCHLORIDE 5 MG/1
5 TABLET ORAL EVERY 12 HOURS
Status: DISCONTINUED | OUTPATIENT
Start: 2022-01-01 | End: 2022-01-01

## 2022-01-01 RX ORDER — METOPROLOL SUCCINATE 25 MG/1
25 TABLET, EXTENDED RELEASE ORAL DAILY
Qty: 90 TABLET | Refills: 3 | Status: SHIPPED | OUTPATIENT
Start: 2022-01-01 | End: 2023-01-01 | Stop reason: SDUPTHER

## 2022-01-01 RX ORDER — TOLVAPTAN 15 MG/1
15 TABLET ORAL ONCE
Status: COMPLETED | OUTPATIENT
Start: 2022-01-01 | End: 2022-01-01

## 2022-01-01 RX ORDER — MIDODRINE HYDROCHLORIDE 2.5 MG/1
2.5 TABLET ORAL EVERY 24 HOURS
Status: DISCONTINUED | OUTPATIENT
Start: 2022-01-01 | End: 2022-01-01

## 2022-01-01 RX ORDER — EMPAGLIFLOZIN 10 MG/1
10 TABLET, FILM COATED ORAL DAILY
Qty: 30 TABLET | Refills: 2 | Status: SHIPPED | OUTPATIENT
Start: 2022-01-01 | End: 2022-01-01 | Stop reason: SDUPTHER

## 2022-01-01 RX ORDER — MECLIZINE HCL 12.5 MG 12.5 MG/1
12.5 TABLET ORAL 3 TIMES DAILY PRN
Refills: 0 | Status: ON HOLD
Start: 2022-01-01 | End: 2022-01-01

## 2022-01-01 RX ORDER — FUROSEMIDE 10 MG/ML
40 INJECTION INTRAMUSCULAR; INTRAVENOUS DAILY
Status: COMPLETED | OUTPATIENT
Start: 2022-01-01 | End: 2022-01-01

## 2022-01-01 RX ORDER — FLUCONAZOLE 200 MG/1
400 TABLET ORAL
Status: COMPLETED | OUTPATIENT
Start: 2022-01-01 | End: 2022-01-01

## 2022-01-01 RX ORDER — OXYCODONE HYDROCHLORIDE 5 MG/1
5 TABLET ORAL EVERY 6 HOURS PRN
Qty: 20 TABLET | Refills: 0 | Status: ON HOLD | OUTPATIENT
Start: 2022-01-01 | End: 2022-01-01 | Stop reason: HOSPADM

## 2022-01-01 RX ORDER — CARVEDILOL 3.12 MG/1
3.12 TABLET ORAL 2 TIMES DAILY
Qty: 60 TABLET | Refills: 11 | Status: ON HOLD | OUTPATIENT
Start: 2022-01-01 | End: 2022-01-01 | Stop reason: HOSPADM

## 2022-01-01 RX ORDER — POLYETHYLENE GLYCOL 3350 17 G/17G
17 POWDER, FOR SOLUTION ORAL DAILY PRN
Status: DISCONTINUED | OUTPATIENT
Start: 2022-01-01 | End: 2022-01-01 | Stop reason: HOSPADM

## 2022-01-01 RX ORDER — ONDANSETRON 4 MG/1
4 TABLET, ORALLY DISINTEGRATING ORAL ONCE
Status: COMPLETED | OUTPATIENT
Start: 2022-01-01 | End: 2022-01-01

## 2022-01-01 RX ORDER — FUROSEMIDE 40 MG/1
40 TABLET ORAL 2 TIMES DAILY
Status: DISCONTINUED | OUTPATIENT
Start: 2022-01-01 | End: 2022-01-01 | Stop reason: HOSPADM

## 2022-01-01 RX ORDER — CEFEPIME HYDROCHLORIDE 1 G/50ML
2 INJECTION, SOLUTION INTRAVENOUS
Status: COMPLETED | OUTPATIENT
Start: 2022-01-01 | End: 2022-01-01

## 2022-01-01 RX ORDER — FUROSEMIDE 10 MG/ML
40 INJECTION INTRAMUSCULAR; INTRAVENOUS
Status: DISCONTINUED | OUTPATIENT
Start: 2022-01-01 | End: 2022-01-01

## 2022-01-01 RX ORDER — PANTOPRAZOLE SODIUM 40 MG/1
40 TABLET, DELAYED RELEASE ORAL DAILY
Status: DISCONTINUED | OUTPATIENT
Start: 2022-01-01 | End: 2022-01-01 | Stop reason: HOSPADM

## 2022-01-01 RX ORDER — FUROSEMIDE 20 MG/1
20 TABLET ORAL DAILY
Qty: 30 TABLET | Refills: 11 | Status: ON HOLD
Start: 2022-01-01 | End: 2022-01-01 | Stop reason: SDUPTHER

## 2022-01-01 RX ORDER — EZETIMIBE 10 MG/1
10 TABLET ORAL NIGHTLY
Status: DISCONTINUED | OUTPATIENT
Start: 2022-01-01 | End: 2022-01-01 | Stop reason: HOSPADM

## 2022-01-01 RX ORDER — DOXYCYCLINE 100 MG/1
100 CAPSULE ORAL EVERY 12 HOURS
Qty: 14 CAPSULE | Refills: 0 | Status: SHIPPED | OUTPATIENT
Start: 2022-01-01 | End: 2022-01-01 | Stop reason: SDUPTHER

## 2022-01-01 RX ORDER — FAMOTIDINE 20 MG/1
20 TABLET, FILM COATED ORAL DAILY
Status: DISCONTINUED | OUTPATIENT
Start: 2022-01-01 | End: 2022-01-01 | Stop reason: HOSPADM

## 2022-01-01 RX ORDER — MUPIROCIN 20 MG/G
OINTMENT TOPICAL 2 TIMES DAILY
Status: DISPENSED | OUTPATIENT
Start: 2022-01-01 | End: 2022-01-01

## 2022-01-01 RX ORDER — FUROSEMIDE 10 MG/ML
40 INJECTION INTRAMUSCULAR; INTRAVENOUS ONCE
Status: COMPLETED | OUTPATIENT
Start: 2022-01-01 | End: 2022-01-01

## 2022-01-01 RX ORDER — TOLVAPTAN 30 MG/1
30 TABLET ORAL ONCE
Status: COMPLETED | OUTPATIENT
Start: 2022-01-01 | End: 2022-01-01

## 2022-01-01 RX ORDER — MAGNESIUM SULFATE HEPTAHYDRATE 40 MG/ML
2 INJECTION, SOLUTION INTRAVENOUS DAILY
Status: COMPLETED | OUTPATIENT
Start: 2022-01-01 | End: 2022-01-01

## 2022-01-01 RX ORDER — SIMETHICONE 80 MG
80 TABLET,CHEWABLE ORAL EVERY 8 HOURS PRN
Status: DISCONTINUED | OUTPATIENT
Start: 2022-01-01 | End: 2022-01-01 | Stop reason: HOSPADM

## 2022-01-01 RX ORDER — SIMETHICONE 80 MG
1 TABLET,CHEWABLE ORAL 3 TIMES DAILY PRN
Status: DISCONTINUED | OUTPATIENT
Start: 2022-01-01 | End: 2022-01-01 | Stop reason: HOSPADM

## 2022-01-01 RX ORDER — BISACODYL 10 MG
10 SUPPOSITORY, RECTAL RECTAL DAILY PRN
Status: DISCONTINUED | OUTPATIENT
Start: 2022-01-01 | End: 2022-01-01 | Stop reason: HOSPADM

## 2022-01-01 RX ORDER — FUROSEMIDE 10 MG/ML
40 INJECTION INTRAMUSCULAR; INTRAVENOUS DAILY
Status: DISCONTINUED | OUTPATIENT
Start: 2022-01-01 | End: 2022-01-01

## 2022-01-01 RX ORDER — POTASSIUM CHLORIDE 20 MEQ/1
20 TABLET, EXTENDED RELEASE ORAL ONCE
Status: DISCONTINUED | OUTPATIENT
Start: 2022-01-01 | End: 2022-01-01

## 2022-01-01 RX ORDER — TRAMADOL HYDROCHLORIDE 50 MG/1
50 TABLET ORAL EVERY 8 HOURS PRN
Qty: 20 TABLET | Refills: 0 | Status: SHIPPED | OUTPATIENT
Start: 2022-01-01 | End: 2022-01-01 | Stop reason: SDUPTHER

## 2022-01-01 RX ORDER — MECLIZINE HCL 12.5 MG 12.5 MG/1
12.5 TABLET ORAL 3 TIMES DAILY
Qty: 1 TABLET | Refills: 0
Start: 2022-01-01 | End: 2022-01-01 | Stop reason: HOSPADM

## 2022-01-01 RX ORDER — IPRATROPIUM BROMIDE AND ALBUTEROL SULFATE 2.5; .5 MG/3ML; MG/3ML
3 SOLUTION RESPIRATORY (INHALATION) EVERY 6 HOURS PRN
Status: DISCONTINUED | OUTPATIENT
Start: 2022-01-01 | End: 2022-01-01 | Stop reason: HOSPADM

## 2022-01-01 RX ORDER — OXYCODONE HYDROCHLORIDE 5 MG/1
5 TABLET ORAL ONCE
Status: COMPLETED | OUTPATIENT
Start: 2022-01-01 | End: 2022-01-01

## 2022-01-01 RX ORDER — METRONIDAZOLE 500 MG/100ML
500 INJECTION, SOLUTION INTRAVENOUS
Status: DISCONTINUED | OUTPATIENT
Start: 2022-01-01 | End: 2022-01-01

## 2022-01-01 RX ORDER — SODIUM CHLORIDE 9 MG/ML
INJECTION, SOLUTION INTRAVENOUS CONTINUOUS
Status: ACTIVE | OUTPATIENT
Start: 2022-01-01 | End: 2022-01-01

## 2022-01-01 RX ORDER — FUROSEMIDE 20 MG/1
20 TABLET ORAL DAILY
Qty: 30 TABLET | Refills: 1 | Status: ON HOLD | OUTPATIENT
Start: 2022-01-01 | End: 2022-01-01 | Stop reason: HOSPADM

## 2022-01-01 RX ORDER — DOCUSATE SODIUM 100 MG/1
100 CAPSULE, LIQUID FILLED ORAL 2 TIMES DAILY
Status: DISCONTINUED | OUTPATIENT
Start: 2022-01-01 | End: 2022-01-01 | Stop reason: HOSPADM

## 2022-01-01 RX ORDER — LOSARTAN POTASSIUM 25 MG/1
25 TABLET ORAL DAILY
Qty: 90 TABLET | Refills: 3 | Status: SHIPPED | OUTPATIENT
Start: 2022-01-01 | End: 2022-01-01 | Stop reason: SDUPTHER

## 2022-01-01 RX ORDER — MIDODRINE HYDROCHLORIDE 2.5 MG/1
2.5 TABLET ORAL 2 TIMES DAILY WITH MEALS
Status: DISCONTINUED | OUTPATIENT
Start: 2022-01-01 | End: 2022-01-01

## 2022-01-01 RX ORDER — GLUCAGON 1 MG
1 KIT INJECTION
Status: DISCONTINUED | OUTPATIENT
Start: 2022-01-01 | End: 2022-01-01 | Stop reason: HOSPADM

## 2022-01-01 RX ORDER — METOPROLOL TARTRATE 25 MG/1
12.5 TABLET ORAL 2 TIMES DAILY
Status: DISPENSED | OUTPATIENT
Start: 2022-01-01 | End: 2022-01-01

## 2022-01-01 RX ORDER — MORPHINE SULFATE 4 MG/ML
4 INJECTION, SOLUTION INTRAMUSCULAR; INTRAVENOUS
Status: COMPLETED | OUTPATIENT
Start: 2022-01-01 | End: 2022-01-01

## 2022-01-01 RX ORDER — FUROSEMIDE 40 MG/1
40 TABLET ORAL DAILY
Status: DISCONTINUED | OUTPATIENT
Start: 2022-01-01 | End: 2022-01-01

## 2022-01-01 RX ORDER — SODIUM,POTASSIUM PHOSPHATES 280-250MG
1 POWDER IN PACKET (EA) ORAL
Status: COMPLETED | OUTPATIENT
Start: 2022-01-01 | End: 2022-01-01

## 2022-01-01 RX ORDER — METOPROLOL SUCCINATE 25 MG/1
25 TABLET, EXTENDED RELEASE ORAL DAILY
Status: DISCONTINUED | OUTPATIENT
Start: 2022-01-01 | End: 2022-01-01 | Stop reason: HOSPADM

## 2022-01-01 RX ORDER — BALSAM PERU/CASTOR OIL
OINTMENT (GRAM) TOPICAL 2 TIMES DAILY
Status: DISCONTINUED | OUTPATIENT
Start: 2022-01-01 | End: 2022-01-01 | Stop reason: HOSPADM

## 2022-01-01 RX ORDER — EMPAGLIFLOZIN 10 MG/1
10 TABLET, FILM COATED ORAL DAILY
Qty: 30 TABLET | Refills: 2 | Status: SHIPPED | OUTPATIENT
Start: 2022-01-01 | End: 2022-01-01

## 2022-01-01 RX ORDER — VALSARTAN 40 MG/1
40 TABLET ORAL DAILY
Qty: 90 TABLET | Refills: 3 | Status: ON HOLD | OUTPATIENT
Start: 2022-01-01 | End: 2022-01-01

## 2022-01-01 RX ORDER — FLUTICASONE PROPIONATE 50 MCG
1 SPRAY, SUSPENSION (ML) NASAL DAILY
Status: DISCONTINUED | OUTPATIENT
Start: 2022-01-01 | End: 2022-01-01 | Stop reason: HOSPADM

## 2022-01-01 RX ORDER — METRONIDAZOLE 500 MG/1
500 TABLET ORAL EVERY 8 HOURS
Status: DISPENSED | OUTPATIENT
Start: 2022-01-01 | End: 2022-01-01

## 2022-01-01 RX ORDER — TRAMADOL HYDROCHLORIDE 50 MG/1
50 TABLET ORAL EVERY 4 HOURS PRN
Status: DISCONTINUED | OUTPATIENT
Start: 2022-01-01 | End: 2022-01-01 | Stop reason: HOSPADM

## 2022-01-01 RX ORDER — LINEZOLID 600 MG/1
600 TABLET, FILM COATED ORAL EVERY 12 HOURS
Status: COMPLETED | OUTPATIENT
Start: 2022-01-01 | End: 2022-01-01

## 2022-01-01 RX ORDER — ONDANSETRON 4 MG/1
4 TABLET, ORALLY DISINTEGRATING ORAL EVERY 8 HOURS PRN
Status: DISCONTINUED | OUTPATIENT
Start: 2022-01-01 | End: 2022-01-01 | Stop reason: HOSPADM

## 2022-01-01 RX ORDER — VALSARTAN 40 MG/1
40 TABLET ORAL DAILY
Status: DISCONTINUED | OUTPATIENT
Start: 2022-01-01 | End: 2022-01-01

## 2022-01-01 RX ORDER — FUROSEMIDE 40 MG/1
40 TABLET ORAL DAILY
Status: DISCONTINUED | OUTPATIENT
Start: 2022-01-01 | End: 2022-01-01 | Stop reason: HOSPADM

## 2022-01-01 RX ORDER — POTASSIUM CHLORIDE 7.45 MG/ML
20 INJECTION INTRAVENOUS ONCE
Status: COMPLETED | OUTPATIENT
Start: 2022-01-01 | End: 2022-01-01

## 2022-01-01 RX ORDER — CARVEDILOL 3.12 MG/1
3.12 TABLET ORAL 2 TIMES DAILY WITH MEALS
Status: DISCONTINUED | OUTPATIENT
Start: 2022-01-01 | End: 2022-01-01

## 2022-01-01 RX ORDER — HYDROCODONE BITARTRATE AND ACETAMINOPHEN 5; 325 MG/1; MG/1
1 TABLET ORAL EVERY 12 HOURS PRN
Qty: 14 TABLET | Refills: 0 | Status: SHIPPED | OUTPATIENT
Start: 2022-01-01 | End: 2022-01-01

## 2022-01-01 RX ORDER — TRAMADOL HYDROCHLORIDE 50 MG/1
50 TABLET ORAL EVERY 8 HOURS PRN
Qty: 20 TABLET | Refills: 0 | Status: ON HOLD | OUTPATIENT
Start: 2022-01-01 | End: 2022-01-01 | Stop reason: HOSPADM

## 2022-01-01 RX ORDER — FUROSEMIDE 80 MG/1
80 TABLET ORAL 2 TIMES DAILY
Status: DISCONTINUED | OUTPATIENT
Start: 2022-01-01 | End: 2022-01-01

## 2022-01-01 RX ORDER — LANOLIN ALCOHOL/MO/W.PET/CERES
400 CREAM (GRAM) TOPICAL DAILY
Qty: 30 TABLET | Refills: 0 | Status: ON HOLD | OUTPATIENT
Start: 2022-01-01 | End: 2022-01-01 | Stop reason: HOSPADM

## 2022-01-01 RX ORDER — DOCUSATE SODIUM 100 MG/1
100 CAPSULE, LIQUID FILLED ORAL DAILY
Status: DISCONTINUED | OUTPATIENT
Start: 2022-01-01 | End: 2022-01-01

## 2022-01-01 RX ORDER — MAGNESIUM SULFATE 1 G/100ML
1 INJECTION INTRAVENOUS ONCE
Status: COMPLETED | OUTPATIENT
Start: 2022-01-01 | End: 2022-01-01

## 2022-01-01 RX ORDER — TRAMADOL HYDROCHLORIDE 50 MG/1
50 TABLET ORAL EVERY 6 HOURS PRN
Status: DISCONTINUED | OUTPATIENT
Start: 2022-01-01 | End: 2022-01-01 | Stop reason: HOSPADM

## 2022-01-01 RX ORDER — FUROSEMIDE 40 MG/1
40 TABLET ORAL DAILY
Qty: 30 TABLET | Refills: 11 | Status: SHIPPED | OUTPATIENT
Start: 2022-01-01 | End: 2023-01-01 | Stop reason: SDUPTHER

## 2022-01-01 RX ORDER — LOSARTAN POTASSIUM 25 MG/1
25 TABLET ORAL DAILY
Qty: 90 TABLET | Refills: 3 | Status: SHIPPED | OUTPATIENT
Start: 2022-01-01 | End: 2023-01-01 | Stop reason: SDUPTHER

## 2022-01-01 RX ORDER — HYDROXYZINE HYDROCHLORIDE 25 MG/1
25 TABLET, FILM COATED ORAL 3 TIMES DAILY PRN
Status: DISCONTINUED | OUTPATIENT
Start: 2022-01-01 | End: 2022-01-01 | Stop reason: HOSPADM

## 2022-01-01 RX ORDER — ONDANSETRON 4 MG/1
4 TABLET, FILM COATED ORAL EVERY 6 HOURS PRN
Status: DISCONTINUED | OUTPATIENT
Start: 2022-01-01 | End: 2022-01-01 | Stop reason: HOSPADM

## 2022-01-01 RX ORDER — POLYETHYLENE GLYCOL 3350 17 G/17G
17 POWDER, FOR SOLUTION ORAL DAILY
Qty: 1 PACKET | Refills: 0 | Status: ON HOLD
Start: 2022-01-01 | End: 2022-01-01 | Stop reason: SDUPTHER

## 2022-01-01 RX ORDER — POTASSIUM CHLORIDE 7.45 MG/ML
10 INJECTION INTRAVENOUS
Status: COMPLETED | OUTPATIENT
Start: 2022-01-01 | End: 2022-01-01

## 2022-01-01 RX ORDER — LIDOCAINE 50 MG/G
1 PATCH TOPICAL
Status: DISCONTINUED | OUTPATIENT
Start: 2022-01-01 | End: 2022-01-01 | Stop reason: HOSPADM

## 2022-01-01 RX ORDER — LINEZOLID 600 MG/1
600 TABLET, FILM COATED ORAL EVERY 12 HOURS
Status: DISPENSED | OUTPATIENT
Start: 2022-01-01 | End: 2022-01-01

## 2022-01-01 RX ORDER — PANTOPRAZOLE SODIUM 40 MG/1
40 TABLET, DELAYED RELEASE ORAL DAILY
Qty: 30 TABLET | Refills: 1 | Status: ON HOLD | OUTPATIENT
Start: 2022-01-01 | End: 2022-01-01 | Stop reason: HOSPADM

## 2022-01-01 RX ORDER — MIDODRINE HYDROCHLORIDE 5 MG/1
5 TABLET ORAL 2 TIMES DAILY WITH MEALS
Status: DISCONTINUED | OUTPATIENT
Start: 2022-01-01 | End: 2022-01-01

## 2022-01-01 RX ORDER — LOSARTAN POTASSIUM 50 MG/1
50 TABLET ORAL DAILY
Qty: 30 TABLET | Refills: 11 | Status: ON HOLD | OUTPATIENT
Start: 2022-01-01 | End: 2022-01-01 | Stop reason: HOSPADM

## 2022-01-01 RX ORDER — ONDANSETRON 2 MG/ML
4 INJECTION INTRAMUSCULAR; INTRAVENOUS
Status: COMPLETED | OUTPATIENT
Start: 2022-01-01 | End: 2022-01-01

## 2022-01-01 RX ORDER — MECLIZINE HCL 12.5 MG 12.5 MG/1
12.5 TABLET ORAL 3 TIMES DAILY
Status: DISCONTINUED | OUTPATIENT
Start: 2022-01-01 | End: 2022-01-01

## 2022-01-01 RX ORDER — EZETIMIBE 10 MG/1
10 TABLET ORAL NIGHTLY
Qty: 90 TABLET | Refills: 3 | Status: SHIPPED | OUTPATIENT
Start: 2022-01-01 | End: 2023-01-01 | Stop reason: SDUPTHER

## 2022-01-01 RX ORDER — TRAMADOL HYDROCHLORIDE 50 MG/1
50 TABLET ORAL EVERY 4 HOURS PRN
Qty: 20 TABLET | Refills: 0 | Status: SHIPPED | OUTPATIENT
Start: 2022-01-01 | End: 2023-01-01 | Stop reason: SDUPTHER

## 2022-01-01 RX ORDER — HYDROCODONE BITARTRATE AND ACETAMINOPHEN 5; 325 MG/1; MG/1
1 TABLET ORAL EVERY 12 HOURS PRN
Status: DISCONTINUED | OUTPATIENT
Start: 2022-01-01 | End: 2022-01-01

## 2022-01-01 RX ORDER — TALC
6 POWDER (GRAM) TOPICAL NIGHTLY
Status: DISCONTINUED | OUTPATIENT
Start: 2022-01-01 | End: 2022-01-01 | Stop reason: HOSPADM

## 2022-01-01 RX ORDER — CARVEDILOL 3.12 MG/1
3.12 TABLET ORAL 2 TIMES DAILY
Status: DISCONTINUED | OUTPATIENT
Start: 2022-01-01 | End: 2022-01-01 | Stop reason: HOSPADM

## 2022-01-01 RX ORDER — ONDANSETRON 8 MG/1
8 TABLET, ORALLY DISINTEGRATING ORAL EVERY 12 HOURS PRN
Qty: 20 TABLET | Refills: 2 | Status: SHIPPED | OUTPATIENT
Start: 2022-01-01 | End: 2023-01-01 | Stop reason: SDUPTHER

## 2022-01-01 RX ORDER — MECLIZINE HCL 12.5 MG 12.5 MG/1
12.5 TABLET ORAL 3 TIMES DAILY PRN
Status: DISCONTINUED | OUTPATIENT
Start: 2022-01-01 | End: 2022-01-01 | Stop reason: HOSPADM

## 2022-01-01 RX ORDER — MECLIZINE HYDROCHLORIDE 25 MG/1
25 TABLET ORAL 3 TIMES DAILY
Status: DISCONTINUED | OUTPATIENT
Start: 2022-01-01 | End: 2022-01-01

## 2022-01-01 RX ORDER — SPIRONOLACTONE 25 MG/1
12.5 TABLET ORAL DAILY
Qty: 15 TABLET | Refills: 1 | Status: SHIPPED | OUTPATIENT
Start: 2022-01-01 | End: 2022-01-01 | Stop reason: SDUPTHER

## 2022-01-01 RX ORDER — POLYETHYLENE GLYCOL 3350 17 G/17G
17 POWDER, FOR SOLUTION ORAL DAILY
Qty: 510 G | Refills: 1 | Status: ON HOLD | OUTPATIENT
Start: 2022-01-01 | End: 2022-01-01 | Stop reason: HOSPADM

## 2022-01-01 RX ORDER — ENOXAPARIN SODIUM 100 MG/ML
40 INJECTION SUBCUTANEOUS EVERY 24 HOURS
Status: DISCONTINUED | OUTPATIENT
Start: 2022-01-01 | End: 2022-01-01

## 2022-01-01 RX ORDER — ALPRAZOLAM 0.5 MG/1
0.5 TABLET ORAL NIGHTLY PRN
Qty: 30 TABLET | Refills: 1 | Status: SHIPPED | OUTPATIENT
Start: 2022-01-01 | End: 2022-01-01 | Stop reason: SDUPTHER

## 2022-01-01 RX ORDER — METOPROLOL SUCCINATE 25 MG/1
25 TABLET, EXTENDED RELEASE ORAL DAILY
Status: DISCONTINUED | OUTPATIENT
Start: 2022-01-01 | End: 2022-01-01

## 2022-01-01 RX ORDER — FUROSEMIDE 40 MG/1
40 TABLET ORAL 2 TIMES DAILY
Qty: 220 TABLET | Refills: 3 | Status: ON HOLD | OUTPATIENT
Start: 2022-01-01 | End: 2022-01-01 | Stop reason: HOSPADM

## 2022-01-01 RX ORDER — MIRTAZAPINE 15 MG/1
15 TABLET, FILM COATED ORAL NIGHTLY
Qty: 30 TABLET | Refills: 11 | Status: ON HOLD | OUTPATIENT
Start: 2022-01-01 | End: 2022-01-01 | Stop reason: HOSPADM

## 2022-01-01 RX ORDER — DOCUSATE SODIUM 100 MG/1
100 CAPSULE, LIQUID FILLED ORAL 2 TIMES DAILY
Qty: 60 CAPSULE | Refills: 1 | Status: SHIPPED | OUTPATIENT
Start: 2022-01-01 | End: 2022-01-01 | Stop reason: SDUPTHER

## 2022-01-01 RX ORDER — FUROSEMIDE 10 MG/ML
40 INJECTION INTRAMUSCULAR; INTRAVENOUS
Status: COMPLETED | OUTPATIENT
Start: 2022-01-01 | End: 2022-01-01

## 2022-01-01 RX ORDER — MIDODRINE HYDROCHLORIDE 5 MG/1
5 TABLET ORAL EVERY 24 HOURS
Status: DISCONTINUED | OUTPATIENT
Start: 2022-01-01 | End: 2022-01-01

## 2022-01-01 RX ORDER — IBUPROFEN 200 MG
16 TABLET ORAL
Status: DISCONTINUED | OUTPATIENT
Start: 2022-01-01 | End: 2022-01-01 | Stop reason: HOSPADM

## 2022-01-01 RX ORDER — LOSARTAN POTASSIUM 25 MG/1
25 TABLET ORAL DAILY
Qty: 30 TABLET | Refills: 1 | Status: SHIPPED | OUTPATIENT
Start: 2022-01-01 | End: 2022-01-01 | Stop reason: SDUPTHER

## 2022-01-01 RX ORDER — VANCOMYCIN HCL IN 5 % DEXTROSE 1G/250ML
1000 PLASTIC BAG, INJECTION (ML) INTRAVENOUS
Status: DISCONTINUED | OUTPATIENT
Start: 2022-01-01 | End: 2022-01-01

## 2022-01-01 RX ORDER — BALSAM PERU/CASTOR OIL
OINTMENT (GRAM) TOPICAL 2 TIMES DAILY
Qty: 1 EACH | Refills: 0 | Status: ON HOLD
Start: 2022-01-01 | End: 2022-01-01 | Stop reason: CLARIF

## 2022-01-01 RX ORDER — SODIUM,POTASSIUM PHOSPHATES 280-250MG
1 POWDER IN PACKET (EA) ORAL ONCE
Status: COMPLETED | OUTPATIENT
Start: 2022-01-01 | End: 2022-01-01

## 2022-01-01 RX ORDER — SPIRONOLACTONE 25 MG/1
25 TABLET ORAL DAILY
Status: DISCONTINUED | OUTPATIENT
Start: 2022-01-01 | End: 2022-01-01 | Stop reason: HOSPADM

## 2022-01-01 RX ORDER — FUROSEMIDE 10 MG/ML
20 INJECTION INTRAMUSCULAR; INTRAVENOUS
Status: DISCONTINUED | OUTPATIENT
Start: 2022-01-01 | End: 2022-01-01 | Stop reason: HOSPADM

## 2022-01-01 RX ORDER — ONDANSETRON 2 MG/ML
4 INJECTION INTRAMUSCULAR; INTRAVENOUS EVERY 6 HOURS PRN
Status: DISCONTINUED | OUTPATIENT
Start: 2022-01-01 | End: 2022-01-01 | Stop reason: HOSPADM

## 2022-01-01 RX ORDER — NITROGLYCERIN 0.4 MG/1
0.4 TABLET SUBLINGUAL EVERY 5 MIN PRN
Qty: 25 TABLET | Refills: 3 | Status: SHIPPED | OUTPATIENT
Start: 2022-01-01

## 2022-01-01 RX ORDER — LINEZOLID 600 MG/1
600 TABLET, FILM COATED ORAL EVERY 12 HOURS
Qty: 8 TABLET | Refills: 0 | Status: ON HOLD | OUTPATIENT
Start: 2022-01-01 | End: 2022-01-01

## 2022-01-01 RX ORDER — METOPROLOL SUCCINATE 25 MG/1
25 TABLET, EXTENDED RELEASE ORAL DAILY
Qty: 1 TABLET | Refills: 0
Start: 2022-01-01 | End: 2022-01-01

## 2022-01-01 RX ORDER — PANTOPRAZOLE SODIUM 40 MG/1
40 TABLET, DELAYED RELEASE ORAL DAILY
Refills: 11 | Status: DISCONTINUED | OUTPATIENT
Start: 2022-01-01 | End: 2022-01-01 | Stop reason: HOSPADM

## 2022-01-01 RX ORDER — ENOXAPARIN SODIUM 100 MG/ML
40 INJECTION SUBCUTANEOUS EVERY 24 HOURS
Status: DISCONTINUED | OUTPATIENT
Start: 2022-01-01 | End: 2022-01-01 | Stop reason: HOSPADM

## 2022-01-01 RX ORDER — TRAMADOL HYDROCHLORIDE 50 MG/1
50 TABLET ORAL ONCE
Status: COMPLETED | OUTPATIENT
Start: 2022-01-01 | End: 2022-01-01

## 2022-01-01 RX ORDER — FUROSEMIDE 40 MG/1
40 TABLET ORAL DAILY
Qty: 30 TABLET | Refills: 11 | Status: ON HOLD | OUTPATIENT
Start: 2022-01-01 | End: 2022-01-01 | Stop reason: HOSPADM

## 2022-01-01 RX ORDER — POTASSIUM CHLORIDE 20 MEQ/1
40 TABLET, EXTENDED RELEASE ORAL 2 TIMES DAILY
Status: DISCONTINUED | OUTPATIENT
Start: 2022-01-01 | End: 2022-01-01

## 2022-01-01 RX ORDER — SYRING-NEEDL,DISP,INSUL,0.3 ML 29 G X1/2"
296 SYRINGE, EMPTY DISPOSABLE MISCELLANEOUS ONCE
Status: COMPLETED | OUTPATIENT
Start: 2022-01-01 | End: 2022-01-01

## 2022-01-01 RX ORDER — ONDANSETRON 2 MG/ML
4 INJECTION INTRAMUSCULAR; INTRAVENOUS EVERY 8 HOURS PRN
Status: DISCONTINUED | OUTPATIENT
Start: 2022-01-01 | End: 2022-01-01 | Stop reason: HOSPADM

## 2022-01-01 RX ORDER — CLOPIDOGREL BISULFATE 75 MG/1
75 TABLET ORAL DAILY
Qty: 30 TABLET | Refills: 0 | Status: ON HOLD | OUTPATIENT
Start: 2022-01-01 | End: 2022-01-01 | Stop reason: SDUPTHER

## 2022-01-01 RX ORDER — LANOLIN ALCOHOL/MO/W.PET/CERES
400 CREAM (GRAM) TOPICAL DAILY
Status: DISCONTINUED | OUTPATIENT
Start: 2022-01-01 | End: 2022-01-01

## 2022-01-01 RX ORDER — INSULIN ASPART 100 [IU]/ML
1-10 INJECTION, SOLUTION INTRAVENOUS; SUBCUTANEOUS
Status: DISCONTINUED | OUTPATIENT
Start: 2022-01-01 | End: 2022-01-01 | Stop reason: HOSPADM

## 2022-01-01 RX ORDER — ONDANSETRON 8 MG/1
8 TABLET, ORALLY DISINTEGRATING ORAL ONCE
Status: DISCONTINUED | OUTPATIENT
Start: 2022-01-01 | End: 2022-01-01 | Stop reason: HOSPADM

## 2022-01-01 RX ORDER — SPIRONOLACTONE 25 MG/1
12.5 TABLET ORAL DAILY
Qty: 15 TABLET | Refills: 11 | Status: ON HOLD
Start: 2022-01-01 | End: 2022-01-01 | Stop reason: SDUPTHER

## 2022-01-01 RX ORDER — FLUTICASONE PROPIONATE 50 MCG
2 SPRAY, SUSPENSION (ML) NASAL DAILY
Status: DISCONTINUED | OUTPATIENT
Start: 2022-01-01 | End: 2022-01-01 | Stop reason: HOSPADM

## 2022-01-01 RX ORDER — SPIRONOLACTONE 25 MG/1
12.5 TABLET ORAL DAILY
Qty: 45 TABLET | Refills: 3 | Status: SHIPPED | OUTPATIENT
Start: 2022-01-01 | End: 2022-01-01 | Stop reason: SDUPTHER

## 2022-01-01 RX ORDER — HEPARIN SODIUM 5000 [USP'U]/ML
5000 INJECTION, SOLUTION INTRAVENOUS; SUBCUTANEOUS EVERY 8 HOURS
Status: DISCONTINUED | OUTPATIENT
Start: 2022-01-01 | End: 2022-01-01

## 2022-01-01 RX ORDER — FUROSEMIDE 20 MG/1
40 TABLET ORAL DAILY
Status: DISCONTINUED | OUTPATIENT
Start: 2022-01-01 | End: 2022-01-01

## 2022-01-01 RX ORDER — SPIRONOLACTONE 25 MG/1
12.5 TABLET ORAL DAILY
COMMUNITY
End: 2022-01-01

## 2022-01-01 RX ORDER — SPIRONOLACTONE 25 MG/1
12.5 TABLET ORAL DAILY
Qty: 15 TABLET | Refills: 1 | Status: ON HOLD | OUTPATIENT
Start: 2022-01-01 | End: 2022-01-01 | Stop reason: HOSPADM

## 2022-01-01 RX ORDER — ALBUTEROL SULFATE 90 UG/1
2 AEROSOL, METERED RESPIRATORY (INHALATION) EVERY 6 HOURS PRN
Status: DISCONTINUED | OUTPATIENT
Start: 2022-01-01 | End: 2022-01-01 | Stop reason: HOSPADM

## 2022-01-01 RX ORDER — ALPRAZOLAM 0.5 MG/1
0.5 TABLET ORAL NIGHTLY PRN
Qty: 30 TABLET | Refills: 0 | Status: ON HOLD | OUTPATIENT
Start: 2022-01-01 | End: 2022-01-01 | Stop reason: SDUPTHER

## 2022-01-01 RX ORDER — PRASUGREL 10 MG/1
10 TABLET, FILM COATED ORAL DAILY
Status: DISCONTINUED | OUTPATIENT
Start: 2022-01-01 | End: 2022-01-01 | Stop reason: HOSPADM

## 2022-01-01 RX ORDER — TALC
6 POWDER (GRAM) TOPICAL NIGHTLY
Qty: 1 TABLET | Refills: 0 | Status: ON HOLD
Start: 2022-01-01 | End: 2022-01-01

## 2022-01-01 RX ORDER — MIRTAZAPINE 15 MG/1
15 TABLET, FILM COATED ORAL NIGHTLY
Status: DISCONTINUED | OUTPATIENT
Start: 2022-01-01 | End: 2022-01-01

## 2022-01-01 RX ORDER — IBUPROFEN 200 MG
24 TABLET ORAL
Status: DISCONTINUED | OUTPATIENT
Start: 2022-01-01 | End: 2022-01-01 | Stop reason: HOSPADM

## 2022-01-01 RX ORDER — ASPIRIN 81 MG/1
81 TABLET ORAL DAILY
Qty: 90 TABLET | Refills: 3 | Status: SHIPPED | OUTPATIENT
Start: 2022-01-01 | End: 2023-01-01 | Stop reason: SDUPTHER

## 2022-01-01 RX ORDER — HYDROMORPHONE HYDROCHLORIDE 1 MG/ML
0.5 INJECTION, SOLUTION INTRAMUSCULAR; INTRAVENOUS; SUBCUTANEOUS EVERY 6 HOURS PRN
Status: DISCONTINUED | OUTPATIENT
Start: 2022-01-01 | End: 2022-01-01 | Stop reason: HOSPADM

## 2022-01-01 RX ORDER — CARVEDILOL 3.12 MG/1
3.12 TABLET ORAL 2 TIMES DAILY WITH MEALS
Status: DISCONTINUED | OUTPATIENT
Start: 2022-01-01 | End: 2022-01-01 | Stop reason: HOSPADM

## 2022-01-01 RX ORDER — OXYMETAZOLINE HCL 0.05 %
1 SPRAY, NON-AEROSOL (ML) NASAL 2 TIMES DAILY PRN
Status: ACTIVE | OUTPATIENT
Start: 2022-01-01 | End: 2022-01-01

## 2022-01-01 RX ORDER — FUROSEMIDE 10 MG/ML
80 INJECTION INTRAMUSCULAR; INTRAVENOUS
Status: DISCONTINUED | OUTPATIENT
Start: 2022-01-01 | End: 2022-01-01

## 2022-01-01 RX ORDER — TOLVAPTAN 30 MG/1
30 TABLET ORAL
Qty: 24 TABLET | Refills: 1 | Status: SHIPPED | OUTPATIENT
Start: 2022-01-01 | End: 2022-01-01

## 2022-01-01 RX ORDER — PROCHLORPERAZINE EDISYLATE 5 MG/ML
2.5 INJECTION INTRAMUSCULAR; INTRAVENOUS EVERY 6 HOURS PRN
Status: DISCONTINUED | OUTPATIENT
Start: 2022-01-01 | End: 2022-01-01 | Stop reason: HOSPADM

## 2022-01-01 RX ORDER — FUROSEMIDE 20 MG/1
20 TABLET ORAL DAILY
Qty: 30 TABLET | Refills: 1 | Status: SHIPPED | OUTPATIENT
Start: 2022-01-01 | End: 2022-01-01 | Stop reason: SDUPTHER

## 2022-01-01 RX ORDER — FAMOTIDINE 20 MG/1
20 TABLET, FILM COATED ORAL DAILY
Qty: 30 TABLET | Refills: 0 | Status: SHIPPED | OUTPATIENT
Start: 2022-01-01 | End: 2023-09-14

## 2022-01-01 RX ORDER — FUROSEMIDE 10 MG/ML
40 INJECTION INTRAMUSCULAR; INTRAVENOUS EVERY 12 HOURS
Status: DISCONTINUED | OUTPATIENT
Start: 2022-01-01 | End: 2022-01-01

## 2022-01-01 RX ORDER — FUROSEMIDE 10 MG/ML
20 INJECTION INTRAMUSCULAR; INTRAVENOUS
Status: DISCONTINUED | OUTPATIENT
Start: 2022-01-01 | End: 2022-01-01

## 2022-01-01 RX ORDER — FLUTICASONE PROPIONATE 50 MCG
2 SPRAY, SUSPENSION (ML) NASAL DAILY
Status: DISCONTINUED | OUTPATIENT
Start: 2022-01-01 | End: 2022-01-01

## 2022-01-01 RX ORDER — PROCHLORPERAZINE EDISYLATE 5 MG/ML
5 INJECTION INTRAMUSCULAR; INTRAVENOUS EVERY 6 HOURS PRN
Status: DISCONTINUED | OUTPATIENT
Start: 2022-01-01 | End: 2022-01-01 | Stop reason: HOSPADM

## 2022-01-01 RX ORDER — PANTOPRAZOLE SODIUM 40 MG/1
40 TABLET, DELAYED RELEASE ORAL DAILY
Refills: 11 | Status: DISCONTINUED | OUTPATIENT
Start: 2022-01-01 | End: 2022-01-01

## 2022-01-01 RX ORDER — POLYETHYLENE GLYCOL 3350 17 G/17G
17 POWDER, FOR SOLUTION ORAL DAILY
Status: DISCONTINUED | OUTPATIENT
Start: 2022-01-01 | End: 2022-01-01

## 2022-01-01 RX ORDER — PROMETHAZINE HYDROCHLORIDE 25 MG/1
25 TABLET ORAL EVERY 6 HOURS PRN
Qty: 15 TABLET | Refills: 0 | Status: SHIPPED | OUTPATIENT
Start: 2022-01-01 | End: 2023-01-01

## 2022-01-01 RX ORDER — DOXYCYCLINE 100 MG/1
100 CAPSULE ORAL EVERY 12 HOURS
Qty: 14 CAPSULE | Refills: 0
Start: 2022-01-01 | End: 2022-01-01 | Stop reason: SDUPTHER

## 2022-01-01 RX ORDER — ACETAMINOPHEN 325 MG/1
650 TABLET ORAL EVERY 8 HOURS PRN
Status: DISCONTINUED | OUTPATIENT
Start: 2022-01-01 | End: 2022-01-01 | Stop reason: HOSPADM

## 2022-01-01 RX ORDER — CYANOCOBALAMIN 1000 UG/ML
2000 INJECTION, SOLUTION INTRAMUSCULAR; SUBCUTANEOUS ONCE
Status: COMPLETED | OUTPATIENT
Start: 2022-01-01 | End: 2022-01-01

## 2022-01-01 RX ORDER — SPIRONOLACTONE 25 MG/1
25 TABLET ORAL DAILY
Qty: 90 TABLET | Refills: 3 | Status: SHIPPED | OUTPATIENT
Start: 2022-01-01 | End: 2023-01-01

## 2022-01-01 RX ADMIN — EZETIMIBE 10 MG: 10 TABLET ORAL at 10:05

## 2022-01-01 RX ADMIN — OXYCODONE 5 MG: 5 TABLET ORAL at 03:06

## 2022-01-01 RX ADMIN — SIMETHICONE 160 MG: 80 TABLET, CHEWABLE ORAL at 09:09

## 2022-01-01 RX ADMIN — ALPRAZOLAM 0.5 MG: 0.5 TABLET ORAL at 12:12

## 2022-01-01 RX ADMIN — DOCUSATE SODIUM 100 MG: 100 CAPSULE, LIQUID FILLED ORAL at 09:09

## 2022-01-01 RX ADMIN — ONDANSETRON 4 MG: 4 TABLET, ORALLY DISINTEGRATING ORAL at 08:09

## 2022-01-01 RX ADMIN — ASPIRIN 81 MG: 81 TABLET, COATED ORAL at 09:12

## 2022-01-01 RX ADMIN — EZETIMIBE 10 MG: 10 TABLET ORAL at 08:08

## 2022-01-01 RX ADMIN — ASPIRIN 81 MG: 81 TABLET, COATED ORAL at 09:05

## 2022-01-01 RX ADMIN — Medication 400 MG: at 09:09

## 2022-01-01 RX ADMIN — ASPIRIN 81 MG: 81 TABLET, COATED ORAL at 09:09

## 2022-01-01 RX ADMIN — LOSARTAN POTASSIUM 25 MG: 25 TABLET, FILM COATED ORAL at 09:12

## 2022-01-01 RX ADMIN — ALPRAZOLAM 0.5 MG: 0.5 TABLET ORAL at 09:05

## 2022-01-01 RX ADMIN — ONDANSETRON 4 MG: 4 TABLET, ORALLY DISINTEGRATING ORAL at 08:06

## 2022-01-01 RX ADMIN — ASPIRIN 81 MG: 81 TABLET, COATED ORAL at 08:08

## 2022-01-01 RX ADMIN — ACETAMINOPHEN 1000 MG: 500 TABLET ORAL at 09:05

## 2022-01-01 RX ADMIN — ASPIRIN 81 MG: 81 TABLET, COATED ORAL at 09:08

## 2022-01-01 RX ADMIN — ASPIRIN 81 MG: 81 TABLET, COATED ORAL at 09:06

## 2022-01-01 RX ADMIN — ONDANSETRON 4 MG: 2 INJECTION INTRAMUSCULAR; INTRAVENOUS at 11:07

## 2022-01-01 RX ADMIN — TRAMADOL HYDROCHLORIDE 50 MG: 50 TABLET, COATED ORAL at 02:06

## 2022-01-01 RX ADMIN — SIMETHICONE 80 MG: 80 TABLET, CHEWABLE ORAL at 12:08

## 2022-01-01 RX ADMIN — CARVEDILOL 3.12 MG: 3.12 TABLET, FILM COATED ORAL at 08:06

## 2022-01-01 RX ADMIN — SODIUM CHLORIDE 250 ML: 0.9 INJECTION, SOLUTION INTRAVENOUS at 08:08

## 2022-01-01 RX ADMIN — ONDANSETRON 4 MG: 4 TABLET, ORALLY DISINTEGRATING ORAL at 03:08

## 2022-01-01 RX ADMIN — CLOPIDOGREL BISULFATE 75 MG: 75 TABLET ORAL at 09:12

## 2022-01-01 RX ADMIN — TRAMADOL HYDROCHLORIDE 50 MG: 50 TABLET, COATED ORAL at 10:09

## 2022-01-01 RX ADMIN — SIMETHICONE 80 MG: 80 TABLET, CHEWABLE ORAL at 03:06

## 2022-01-01 RX ADMIN — MAGNESIUM CITRATE 296 ML: 1.75 LIQUID ORAL at 12:08

## 2022-01-01 RX ADMIN — HYDROCODONE BITARTRATE AND ACETAMINOPHEN 1 TABLET: 5; 325 TABLET ORAL at 04:07

## 2022-01-01 RX ADMIN — HYDROCODONE BITARTRATE AND ACETAMINOPHEN 1 TABLET: 5; 325 TABLET ORAL at 02:07

## 2022-01-01 RX ADMIN — ONDANSETRON 4 MG: 2 INJECTION INTRAMUSCULAR; INTRAVENOUS at 01:06

## 2022-01-01 RX ADMIN — CLOPIDOGREL 75 MG: 75 TABLET, FILM COATED ORAL at 09:08

## 2022-01-01 RX ADMIN — ASPIRIN 81 MG: 81 TABLET, COATED ORAL at 08:09

## 2022-01-01 RX ADMIN — TRAMADOL HYDROCHLORIDE 50 MG: 50 TABLET, COATED ORAL at 07:06

## 2022-01-01 RX ADMIN — ALPRAZOLAM 0.5 MG: 0.5 TABLET ORAL at 01:12

## 2022-01-01 RX ADMIN — METOPROLOL SUCCINATE 25 MG: 25 TABLET, EXTENDED RELEASE ORAL at 09:09

## 2022-01-01 RX ADMIN — SPIRONOLACTONE 25 MG: 25 TABLET, FILM COATED ORAL at 09:05

## 2022-01-01 RX ADMIN — CARVEDILOL 3.12 MG: 3.12 TABLET, FILM COATED ORAL at 09:09

## 2022-01-01 RX ADMIN — POLYETHYLENE GLYCOL 3350 17 G: 17 POWDER, FOR SOLUTION ORAL at 09:05

## 2022-01-01 RX ADMIN — PROMETHAZINE HYDROCHLORIDE 12.5 MG: 25 INJECTION INTRAMUSCULAR; INTRAVENOUS at 12:05

## 2022-01-01 RX ADMIN — CLOPIDOGREL BISULFATE 75 MG: 75 TABLET, FILM COATED ORAL at 08:06

## 2022-01-01 RX ADMIN — POTASSIUM CHLORIDE 10 MEQ: 7.46 INJECTION, SOLUTION INTRAVENOUS at 01:05

## 2022-01-01 RX ADMIN — POTASSIUM CHLORIDE 10 MEQ: 7.46 INJECTION, SOLUTION INTRAVENOUS at 06:05

## 2022-01-01 RX ADMIN — PANTOPRAZOLE SODIUM 40 MG: 40 TABLET, DELAYED RELEASE ORAL at 09:05

## 2022-01-01 RX ADMIN — PIPERACILLIN SODIUM AND TAZOBACTAM SODIUM 4.5 G: 4; .5 INJECTION, POWDER, LYOPHILIZED, FOR SOLUTION INTRAVENOUS at 02:08

## 2022-01-01 RX ADMIN — ASPIRIN 81 MG: 81 TABLET, COATED ORAL at 08:06

## 2022-01-01 RX ADMIN — SIMETHICONE 80 MG: 80 TABLET, CHEWABLE ORAL at 08:08

## 2022-01-01 RX ADMIN — VANCOMYCIN HYDROCHLORIDE 1250 MG: 1.25 INJECTION, POWDER, LYOPHILIZED, FOR SOLUTION INTRAVENOUS at 03:06

## 2022-01-01 RX ADMIN — TRAMADOL HYDROCHLORIDE 50 MG: 50 TABLET, COATED ORAL at 02:09

## 2022-01-01 RX ADMIN — FAMOTIDINE 20 MG: 20 TABLET ORAL at 09:12

## 2022-01-01 RX ADMIN — ALPRAZOLAM 0.5 MG: 0.5 TABLET ORAL at 11:06

## 2022-01-01 RX ADMIN — CEFEPIME HYDROCHLORIDE 2 G: 2 INJECTION, SOLUTION INTRAVENOUS at 05:05

## 2022-01-01 RX ADMIN — MIDODRINE HYDROCHLORIDE 5 MG: 5 TABLET ORAL at 09:09

## 2022-01-01 RX ADMIN — SPIRONOLACTONE 25 MG: 25 TABLET, FILM COATED ORAL at 11:08

## 2022-01-01 RX ADMIN — SPIRONOLACTONE 12.5 MG: 25 TABLET, FILM COATED ORAL at 11:06

## 2022-01-01 RX ADMIN — LINEZOLID 600 MG: 600 INJECTION, SOLUTION INTRAVENOUS at 06:05

## 2022-01-01 RX ADMIN — POTASSIUM & SODIUM PHOSPHATES POWDER PACK 280-160-250 MG 2 PACKET: 280-160-250 PACK at 06:05

## 2022-01-01 RX ADMIN — MAGNESIUM SULFATE HEPTAHYDRATE 2 G: 40 INJECTION, SOLUTION INTRAVENOUS at 09:05

## 2022-01-01 RX ADMIN — CLOPIDOGREL BISULFATE 75 MG: 75 TABLET, FILM COATED ORAL at 10:06

## 2022-01-01 RX ADMIN — OXYCODONE 5 MG: 5 TABLET ORAL at 10:05

## 2022-01-01 RX ADMIN — PANTOPRAZOLE SODIUM 40 MG: 40 TABLET, DELAYED RELEASE ORAL at 10:08

## 2022-01-01 RX ADMIN — CLOPIDOGREL 75 MG: 75 TABLET, FILM COATED ORAL at 08:08

## 2022-01-01 RX ADMIN — EZETIMIBE 10 MG: 10 TABLET ORAL at 09:06

## 2022-01-01 RX ADMIN — CARVEDILOL 3.12 MG: 3.12 TABLET, FILM COATED ORAL at 05:06

## 2022-01-01 RX ADMIN — EZETIMIBE 10 MG: 10 TABLET ORAL at 11:05

## 2022-01-01 RX ADMIN — VANCOMYCIN HYDROCHLORIDE 750 MG: 750 INJECTION, POWDER, LYOPHILIZED, FOR SOLUTION INTRAVENOUS at 05:07

## 2022-01-01 RX ADMIN — MORPHINE SULFATE 2 MG: 2 INJECTION, SOLUTION INTRAMUSCULAR; INTRAVENOUS at 12:07

## 2022-01-01 RX ADMIN — FUROSEMIDE 20 MG: 20 TABLET ORAL at 11:06

## 2022-01-01 RX ADMIN — TRAMADOL HYDROCHLORIDE 50 MG: 50 TABLET, COATED ORAL at 03:06

## 2022-01-01 RX ADMIN — TRAMADOL HYDROCHLORIDE 50 MG: 50 TABLET, COATED ORAL at 09:09

## 2022-01-01 RX ADMIN — FUROSEMIDE 40 MG: 40 TABLET ORAL at 09:09

## 2022-01-01 RX ADMIN — FLUCONAZOLE 400 MG: 200 TABLET ORAL at 04:05

## 2022-01-01 RX ADMIN — DOCUSATE SODIUM 100 MG: 100 CAPSULE, LIQUID FILLED ORAL at 08:06

## 2022-01-01 RX ADMIN — Medication 400 MG: at 12:09

## 2022-01-01 RX ADMIN — DOXYCYCLINE HYCLATE 100 MG: 100 TABLET, COATED ORAL at 10:06

## 2022-01-01 RX ADMIN — CLOPIDOGREL 75 MG: 75 TABLET, FILM COATED ORAL at 08:09

## 2022-01-01 RX ADMIN — COLLAGENASE SANTYL: 250 OINTMENT TOPICAL at 09:06

## 2022-01-01 RX ADMIN — CLOPIDOGREL 75 MG: 75 TABLET, FILM COATED ORAL at 09:07

## 2022-01-01 RX ADMIN — ENOXAPARIN SODIUM 40 MG: 100 INJECTION SUBCUTANEOUS at 04:08

## 2022-01-01 RX ADMIN — FUROSEMIDE 40 MG: 20 TABLET ORAL at 09:05

## 2022-01-01 RX ADMIN — DOCUSATE SODIUM 100 MG: 100 CAPSULE, LIQUID FILLED ORAL at 08:09

## 2022-01-01 RX ADMIN — CARVEDILOL 12.5 MG: 12.5 TABLET, FILM COATED ORAL at 09:05

## 2022-01-01 RX ADMIN — HYDROCODONE BITARTRATE AND ACETAMINOPHEN 1 TABLET: 5; 325 TABLET ORAL at 12:08

## 2022-01-01 RX ADMIN — POTASSIUM BICARBONATE 40 MEQ: 391 TABLET, EFFERVESCENT ORAL at 08:08

## 2022-01-01 RX ADMIN — METRONIDAZOLE 500 MG: 500 INJECTION, SOLUTION INTRAVENOUS at 03:05

## 2022-01-01 RX ADMIN — MAGNESIUM SULFATE 2 G: 2 INJECTION INTRAVENOUS at 10:05

## 2022-01-01 RX ADMIN — MAGNESIUM SULFATE HEPTAHYDRATE 2 G: 40 INJECTION, SOLUTION INTRAVENOUS at 12:05

## 2022-01-01 RX ADMIN — ENOXAPARIN SODIUM 40 MG: 100 INJECTION SUBCUTANEOUS at 06:09

## 2022-01-01 RX ADMIN — CARVEDILOL 3.12 MG: 3.12 TABLET, FILM COATED ORAL at 08:08

## 2022-01-01 RX ADMIN — CEFEPIME HYDROCHLORIDE 2 G: 2 INJECTION, SOLUTION INTRAVENOUS at 09:05

## 2022-01-01 RX ADMIN — COLLAGENASE SANTYL: 250 OINTMENT TOPICAL at 11:06

## 2022-01-01 RX ADMIN — ASPIRIN 81 MG: 81 TABLET, COATED ORAL at 10:06

## 2022-01-01 RX ADMIN — DOCUSATE SODIUM 100 MG: 100 CAPSULE, LIQUID FILLED ORAL at 09:06

## 2022-01-01 RX ADMIN — ENOXAPARIN SODIUM 40 MG: 100 INJECTION SUBCUTANEOUS at 07:06

## 2022-01-01 RX ADMIN — POTASSIUM & SODIUM PHOSPHATES POWDER PACK 280-160-250 MG 1 PACKET: 280-160-250 PACK at 10:05

## 2022-01-01 RX ADMIN — COLLAGENASE SANTYL: 250 OINTMENT TOPICAL at 08:06

## 2022-01-01 RX ADMIN — SIMETHICONE 80 MG: 80 TABLET, CHEWABLE ORAL at 08:07

## 2022-01-01 RX ADMIN — POLYETHYLENE GLYCOL 3350 17 G: 17 POWDER, FOR SOLUTION ORAL at 08:06

## 2022-01-01 RX ADMIN — SIMETHICONE 160 MG: 80 TABLET, CHEWABLE ORAL at 10:09

## 2022-01-01 RX ADMIN — PANTOPRAZOLE SODIUM 40 MG: 40 TABLET, DELAYED RELEASE ORAL at 09:09

## 2022-01-01 RX ADMIN — POTASSIUM CHLORIDE 40 MEQ: 1500 TABLET, EXTENDED RELEASE ORAL at 08:07

## 2022-01-01 RX ADMIN — MIDODRINE HYDROCHLORIDE 5 MG: 5 TABLET ORAL at 04:09

## 2022-01-01 RX ADMIN — FUROSEMIDE 80 MG: 80 TABLET ORAL at 05:08

## 2022-01-01 RX ADMIN — ONDANSETRON 4 MG: 4 TABLET, ORALLY DISINTEGRATING ORAL at 12:09

## 2022-01-01 RX ADMIN — SODIUM CHLORIDE: 0.9 INJECTION, SOLUTION INTRAVENOUS at 12:05

## 2022-01-01 RX ADMIN — FAMOTIDINE 20 MG: 20 TABLET ORAL at 09:09

## 2022-01-01 RX ADMIN — COLLAGENASE SANTYL: 250 OINTMENT TOPICAL at 02:06

## 2022-01-01 RX ADMIN — METRONIDAZOLE 500 MG: 500 INJECTION, SOLUTION INTRAVENOUS at 02:07

## 2022-01-01 RX ADMIN — CLOPIDOGREL BISULFATE 75 MG: 75 TABLET, FILM COATED ORAL at 09:06

## 2022-01-01 RX ADMIN — TRAMADOL HYDROCHLORIDE 50 MG: 50 TABLET, COATED ORAL at 09:12

## 2022-01-01 RX ADMIN — METOPROLOL SUCCINATE 25 MG: 25 TABLET, EXTENDED RELEASE ORAL at 09:05

## 2022-01-01 RX ADMIN — PANTOPRAZOLE SODIUM 40 MG: 40 TABLET, DELAYED RELEASE ORAL at 08:08

## 2022-01-01 RX ADMIN — PANTOPRAZOLE SODIUM 40 MG: 40 TABLET, DELAYED RELEASE ORAL at 10:06

## 2022-01-01 RX ADMIN — FUROSEMIDE 40 MG: 10 INJECTION, SOLUTION INTRAMUSCULAR; INTRAVENOUS at 08:05

## 2022-01-01 RX ADMIN — ENOXAPARIN SODIUM 40 MG: 100 INJECTION SUBCUTANEOUS at 05:09

## 2022-01-01 RX ADMIN — EZETIMIBE 10 MG: 10 TABLET ORAL at 08:06

## 2022-01-01 RX ADMIN — FUROSEMIDE 40 MG: 40 TABLET ORAL at 09:12

## 2022-01-01 RX ADMIN — PIPERACILLIN SODIUM AND TAZOBACTAM SODIUM 4.5 G: 4; .5 INJECTION, POWDER, LYOPHILIZED, FOR SOLUTION INTRAVENOUS at 05:08

## 2022-01-01 RX ADMIN — FAMOTIDINE 20 MG: 20 TABLET ORAL at 10:12

## 2022-01-01 RX ADMIN — PANTOPRAZOLE SODIUM 40 MG: 40 TABLET, DELAYED RELEASE ORAL at 08:05

## 2022-01-01 RX ADMIN — TRAMADOL HYDROCHLORIDE 50 MG: 50 TABLET, COATED ORAL at 01:06

## 2022-01-01 RX ADMIN — COLLAGENASE SANTYL: 250 OINTMENT TOPICAL at 10:06

## 2022-01-01 RX ADMIN — FUROSEMIDE 20 MG: 20 TABLET ORAL at 08:06

## 2022-01-01 RX ADMIN — DOCUSATE SODIUM 100 MG: 100 CAPSULE, LIQUID FILLED ORAL at 08:08

## 2022-01-01 RX ADMIN — TRAMADOL HYDROCHLORIDE 50 MG: 50 TABLET, COATED ORAL at 08:12

## 2022-01-01 RX ADMIN — PANTOPRAZOLE SODIUM 40 MG: 40 TABLET, DELAYED RELEASE ORAL at 09:08

## 2022-01-01 RX ADMIN — TRAMADOL HYDROCHLORIDE 50 MG: 50 TABLET, COATED ORAL at 03:12

## 2022-01-01 RX ADMIN — CLOPIDOGREL BISULFATE 75 MG: 75 TABLET ORAL at 08:12

## 2022-01-01 RX ADMIN — PROMETHAZINE HYDROCHLORIDE 12.5 MG: 25 INJECTION INTRAMUSCULAR; INTRAVENOUS at 05:05

## 2022-01-01 RX ADMIN — FLUTICASONE PROPIONATE 50 MCG: 50 SPRAY, METERED NASAL at 09:06

## 2022-01-01 RX ADMIN — ALUMINUM HYDROXIDE, MAGNESIUM HYDROXIDE, AND SIMETHICONE 30 ML: 200; 200; 20 SUSPENSION ORAL at 05:05

## 2022-01-01 RX ADMIN — PROMETHAZINE HYDROCHLORIDE 12.5 MG: 25 INJECTION INTRAMUSCULAR; INTRAVENOUS at 06:05

## 2022-01-01 RX ADMIN — PANTOPRAZOLE SODIUM 40 MG: 40 TABLET, DELAYED RELEASE ORAL at 09:06

## 2022-01-01 RX ADMIN — ENOXAPARIN SODIUM 40 MG: 100 INJECTION SUBCUTANEOUS at 05:08

## 2022-01-01 RX ADMIN — CLOPIDOGREL 75 MG: 75 TABLET, FILM COATED ORAL at 11:08

## 2022-01-01 RX ADMIN — ALPRAZOLAM 0.5 MG: 0.5 TABLET ORAL at 10:09

## 2022-01-01 RX ADMIN — TRAMADOL HYDROCHLORIDE 50 MG: 50 TABLET, COATED ORAL at 09:05

## 2022-01-01 RX ADMIN — FAMOTIDINE 20 MG: 20 TABLET ORAL at 08:09

## 2022-01-01 RX ADMIN — FLUTICASONE PROPIONATE 50 MCG: 50 SPRAY, METERED NASAL at 09:12

## 2022-01-01 RX ADMIN — IOHEXOL 75 ML: 350 INJECTION, SOLUTION INTRAVENOUS at 02:07

## 2022-01-01 RX ADMIN — CARVEDILOL 3.12 MG: 3.12 TABLET, FILM COATED ORAL at 09:05

## 2022-01-01 RX ADMIN — COLLAGENASE SANTYL: 250 OINTMENT TOPICAL at 09:08

## 2022-01-01 RX ADMIN — CARVEDILOL 3.12 MG: 3.12 TABLET, FILM COATED ORAL at 08:09

## 2022-01-01 RX ADMIN — TRAMADOL HYDROCHLORIDE 50 MG: 50 TABLET, COATED ORAL at 05:09

## 2022-01-01 RX ADMIN — FUROSEMIDE 80 MG: 10 INJECTION, SOLUTION INTRAMUSCULAR; INTRAVENOUS at 08:08

## 2022-01-01 RX ADMIN — DOCUSATE SODIUM 100 MG: 100 CAPSULE, LIQUID FILLED ORAL at 09:08

## 2022-01-01 RX ADMIN — ALUMINUM HYDROXIDE, MAGNESIUM HYDROXIDE, AND SIMETHICONE 30 ML: 200; 200; 20 SUSPENSION ORAL at 12:05

## 2022-01-01 RX ADMIN — FUROSEMIDE 40 MG: 40 TABLET ORAL at 08:12

## 2022-01-01 RX ADMIN — SIMETHICONE 80 MG: 80 TABLET, CHEWABLE ORAL at 12:07

## 2022-01-01 RX ADMIN — CARVEDILOL 3.12 MG: 3.12 TABLET, FILM COATED ORAL at 09:08

## 2022-01-01 RX ADMIN — ENOXAPARIN SODIUM 40 MG: 100 INJECTION SUBCUTANEOUS at 04:09

## 2022-01-01 RX ADMIN — Medication: at 09:05

## 2022-01-01 RX ADMIN — TRAMADOL HYDROCHLORIDE 50 MG: 50 TABLET, COATED ORAL at 01:09

## 2022-01-01 RX ADMIN — DOCUSATE SODIUM 100 MG: 100 CAPSULE, LIQUID FILLED ORAL at 08:07

## 2022-01-01 RX ADMIN — POTASSIUM CHLORIDE 10 MEQ: 7.46 INJECTION, SOLUTION INTRAVENOUS at 07:05

## 2022-01-01 RX ADMIN — MUPIROCIN: 20 OINTMENT TOPICAL at 09:05

## 2022-01-01 RX ADMIN — POTASSIUM CHLORIDE 20 MEQ: 1500 TABLET, EXTENDED RELEASE ORAL at 11:06

## 2022-01-01 RX ADMIN — SIMETHICONE 80 MG: 80 TABLET, CHEWABLE ORAL at 09:06

## 2022-01-01 RX ADMIN — DOCUSATE SODIUM 100 MG: 100 CAPSULE, LIQUID FILLED ORAL at 10:09

## 2022-01-01 RX ADMIN — SODIUM CHLORIDE 500 ML: 0.9 INJECTION, SOLUTION INTRAVENOUS at 12:05

## 2022-01-01 RX ADMIN — FUROSEMIDE 80 MG: 10 INJECTION, SOLUTION INTRAMUSCULAR; INTRAVENOUS at 09:08

## 2022-01-01 RX ADMIN — CALCIUM CARBONATE (ANTACID) CHEW TAB 500 MG 500 MG: 500 CHEW TAB at 12:05

## 2022-01-01 RX ADMIN — POTASSIUM BICARBONATE 40 MEQ: 391 TABLET, EFFERVESCENT ORAL at 11:08

## 2022-01-01 RX ADMIN — ALTEPLASE 2 MG: 2.2 INJECTION, POWDER, LYOPHILIZED, FOR SOLUTION INTRAVENOUS at 10:05

## 2022-01-01 RX ADMIN — LINEZOLID 600 MG: 600 TABLET, FILM COATED ORAL at 08:05

## 2022-01-01 RX ADMIN — TRAMADOL HYDROCHLORIDE 50 MG: 50 TABLET, COATED ORAL at 08:08

## 2022-01-01 RX ADMIN — VANCOMYCIN HYDROCHLORIDE 1000 MG: 1 INJECTION, POWDER, LYOPHILIZED, FOR SOLUTION INTRAVENOUS at 03:06

## 2022-01-01 RX ADMIN — CARVEDILOL 3.12 MG: 3.12 TABLET, FILM COATED ORAL at 10:06

## 2022-01-01 RX ADMIN — ONDANSETRON 8 MG: 2 INJECTION INTRAMUSCULAR; INTRAVENOUS at 08:08

## 2022-01-01 RX ADMIN — CARVEDILOL 3.12 MG: 3.12 TABLET, FILM COATED ORAL at 08:05

## 2022-01-01 RX ADMIN — ONDANSETRON 4 MG: 2 INJECTION INTRAMUSCULAR; INTRAVENOUS at 07:06

## 2022-01-01 RX ADMIN — EZETIMIBE 10 MG: 10 TABLET ORAL at 09:05

## 2022-01-01 RX ADMIN — VANCOMYCIN HYDROCHLORIDE 750 MG: 750 INJECTION, POWDER, LYOPHILIZED, FOR SOLUTION INTRAVENOUS at 06:07

## 2022-01-01 RX ADMIN — FUROSEMIDE 40 MG: 10 INJECTION, SOLUTION INTRAMUSCULAR; INTRAVENOUS at 02:06

## 2022-01-01 RX ADMIN — TRAMADOL HYDROCHLORIDE 50 MG: 50 TABLET, COATED ORAL at 06:09

## 2022-01-01 RX ADMIN — ONDANSETRON 4 MG: 2 INJECTION INTRAMUSCULAR; INTRAVENOUS at 11:08

## 2022-01-01 RX ADMIN — MIDODRINE HYDROCHLORIDE 2.5 MG: 2.5 TABLET ORAL at 09:08

## 2022-01-01 RX ADMIN — SIMETHICONE 80 MG: 80 TABLET, CHEWABLE ORAL at 05:09

## 2022-01-01 RX ADMIN — ASPIRIN 81 MG: 81 TABLET, COATED ORAL at 10:12

## 2022-01-01 RX ADMIN — MIDODRINE HYDROCHLORIDE 2.5 MG: 2.5 TABLET ORAL at 11:08

## 2022-01-01 RX ADMIN — MAGNESIUM SULFATE 2 G: 2 INJECTION INTRAVENOUS at 10:06

## 2022-01-01 RX ADMIN — HYDROCODONE BITARTRATE AND ACETAMINOPHEN 1 TABLET: 5; 325 TABLET ORAL at 02:06

## 2022-01-01 RX ADMIN — HYDROCODONE BITARTRATE AND ACETAMINOPHEN 1 TABLET: 5; 325 TABLET ORAL at 03:07

## 2022-01-01 RX ADMIN — FUROSEMIDE 40 MG: 10 INJECTION, SOLUTION INTRAMUSCULAR; INTRAVENOUS at 09:06

## 2022-01-01 RX ADMIN — MUPIROCIN: 20 OINTMENT TOPICAL at 10:05

## 2022-01-01 RX ADMIN — VANCOMYCIN HYDROCHLORIDE 750 MG: 750 INJECTION, POWDER, LYOPHILIZED, FOR SOLUTION INTRAVENOUS at 01:07

## 2022-01-01 RX ADMIN — SIMETHICONE 80 MG: 80 TABLET, CHEWABLE ORAL at 12:05

## 2022-01-01 RX ADMIN — FLUCONAZOLE 400 MG: 200 TABLET ORAL at 06:05

## 2022-01-01 RX ADMIN — TRAMADOL HYDROCHLORIDE 50 MG: 50 TABLET, COATED ORAL at 04:06

## 2022-01-01 RX ADMIN — ASPIRIN 81 MG: 81 TABLET, COATED ORAL at 08:05

## 2022-01-01 RX ADMIN — POLYETHYLENE GLYCOL 3350 17 G: 17 POWDER, FOR SOLUTION ORAL at 09:08

## 2022-01-01 RX ADMIN — CARVEDILOL 12.5 MG: 12.5 TABLET, FILM COATED ORAL at 10:05

## 2022-01-01 RX ADMIN — Medication 400 MG: at 02:05

## 2022-01-01 RX ADMIN — PRASUGREL 10 MG: 10 TABLET, FILM COATED ORAL at 01:05

## 2022-01-01 RX ADMIN — LINEZOLID 600 MG: 600 TABLET, FILM COATED ORAL at 09:05

## 2022-01-01 RX ADMIN — ONDANSETRON 4 MG: 2 INJECTION INTRAMUSCULAR; INTRAVENOUS at 12:08

## 2022-01-01 RX ADMIN — SPIRONOLACTONE 25 MG: 25 TABLET, FILM COATED ORAL at 08:05

## 2022-01-01 RX ADMIN — METRONIDAZOLE 500 MG: 500 INJECTION, SOLUTION INTRAVENOUS at 03:07

## 2022-01-01 RX ADMIN — CEFEPIME HYDROCHLORIDE 2 G: 2 INJECTION, SOLUTION INTRAVENOUS at 06:05

## 2022-01-01 RX ADMIN — TRAMADOL HYDROCHLORIDE 50 MG: 50 TABLET, COATED ORAL at 02:12

## 2022-01-01 RX ADMIN — METOPROLOL TARTRATE 12.5 MG: 25 TABLET, FILM COATED ORAL at 09:09

## 2022-01-01 RX ADMIN — COLLAGENASE SANTYL: 250 OINTMENT TOPICAL at 10:05

## 2022-01-01 RX ADMIN — MAGNESIUM SULFATE HEPTAHYDRATE 2 G: 40 INJECTION, SOLUTION INTRAVENOUS at 09:07

## 2022-01-01 RX ADMIN — EZETIMIBE 10 MG: 10 TABLET ORAL at 08:09

## 2022-01-01 RX ADMIN — TRAMADOL HYDROCHLORIDE 50 MG: 50 TABLET, COATED ORAL at 08:09

## 2022-01-01 RX ADMIN — ONDANSETRON 4 MG: 4 TABLET, ORALLY DISINTEGRATING ORAL at 09:08

## 2022-01-01 RX ADMIN — TRAMADOL HYDROCHLORIDE 50 MG: 50 TABLET, COATED ORAL at 12:09

## 2022-01-01 RX ADMIN — FLUCONAZOLE 400 MG: 200 TABLET ORAL at 07:05

## 2022-01-01 RX ADMIN — POTASSIUM CHLORIDE 10 MEQ: 7.46 INJECTION, SOLUTION INTRAVENOUS at 03:05

## 2022-01-01 RX ADMIN — MUPIROCIN: 20 OINTMENT TOPICAL at 08:06

## 2022-01-01 RX ADMIN — FUROSEMIDE 40 MG: 40 TABLET ORAL at 08:09

## 2022-01-01 RX ADMIN — LINEZOLID 600 MG: 600 INJECTION, SOLUTION INTRAVENOUS at 05:05

## 2022-01-01 RX ADMIN — LOSARTAN POTASSIUM 25 MG: 25 TABLET, FILM COATED ORAL at 10:12

## 2022-01-01 RX ADMIN — CARVEDILOL 3.12 MG: 3.12 TABLET, FILM COATED ORAL at 10:08

## 2022-01-01 RX ADMIN — ONDANSETRON 4 MG: 2 INJECTION INTRAMUSCULAR; INTRAVENOUS at 09:06

## 2022-01-01 RX ADMIN — VANCOMYCIN HYDROCHLORIDE 750 MG: 750 INJECTION, POWDER, LYOPHILIZED, FOR SOLUTION INTRAVENOUS at 11:06

## 2022-01-01 RX ADMIN — POLYETHYLENE GLYCOL 3350 17 G: 17 POWDER, FOR SOLUTION ORAL at 09:09

## 2022-01-01 RX ADMIN — ACETAMINOPHEN 650 MG: 325 TABLET, FILM COATED ORAL at 09:09

## 2022-01-01 RX ADMIN — CLOPIDOGREL 75 MG: 75 TABLET, FILM COATED ORAL at 09:09

## 2022-01-01 RX ADMIN — ONDANSETRON 4 MG: 2 INJECTION INTRAMUSCULAR; INTRAVENOUS at 03:07

## 2022-01-01 RX ADMIN — LOSARTAN POTASSIUM 25 MG: 25 TABLET, FILM COATED ORAL at 09:09

## 2022-01-01 RX ADMIN — SIMETHICONE 80 MG: 80 TABLET, CHEWABLE ORAL at 07:08

## 2022-01-01 RX ADMIN — FUROSEMIDE 40 MG: 40 TABLET ORAL at 09:08

## 2022-01-01 RX ADMIN — ALPRAZOLAM 0.5 MG: 0.5 TABLET ORAL at 09:08

## 2022-01-01 RX ADMIN — TRAMADOL HYDROCHLORIDE 50 MG: 50 TABLET, COATED ORAL at 04:09

## 2022-01-01 RX ADMIN — EZETIMIBE 10 MG: 10 TABLET ORAL at 09:09

## 2022-01-01 RX ADMIN — POLYETHYLENE GLYCOL 3350 17 G: 17 POWDER, FOR SOLUTION ORAL at 10:06

## 2022-01-01 RX ADMIN — ENOXAPARIN SODIUM 40 MG: 100 INJECTION SUBCUTANEOUS at 05:05

## 2022-01-01 RX ADMIN — FUROSEMIDE 40 MG: 10 INJECTION, SOLUTION INTRAMUSCULAR; INTRAVENOUS at 04:08

## 2022-01-01 RX ADMIN — COLLAGENASE SANTYL: 250 OINTMENT TOPICAL at 05:05

## 2022-01-01 RX ADMIN — SIMETHICONE 80 MG: 80 TABLET, CHEWABLE ORAL at 12:06

## 2022-01-01 RX ADMIN — TOLVAPTAN 30 MG: 30 TABLET ORAL at 08:09

## 2022-01-01 RX ADMIN — ENOXAPARIN SODIUM 40 MG: 100 INJECTION SUBCUTANEOUS at 05:06

## 2022-01-01 RX ADMIN — ENOXAPARIN SODIUM 40 MG: 100 INJECTION SUBCUTANEOUS at 04:06

## 2022-01-01 RX ADMIN — METRONIDAZOLE 500 MG: 500 TABLET ORAL at 07:05

## 2022-01-01 RX ADMIN — METOPROLOL SUCCINATE 25 MG: 25 TABLET, EXTENDED RELEASE ORAL at 09:08

## 2022-01-01 RX ADMIN — MUPIROCIN: 20 OINTMENT TOPICAL at 08:05

## 2022-01-01 RX ADMIN — Medication: at 08:05

## 2022-01-01 RX ADMIN — FUROSEMIDE 40 MG: 40 TABLET ORAL at 05:09

## 2022-01-01 RX ADMIN — SIMETHICONE 160 MG: 80 TABLET, CHEWABLE ORAL at 05:09

## 2022-01-01 RX ADMIN — TRAMADOL HYDROCHLORIDE 50 MG: 50 TABLET, COATED ORAL at 10:08

## 2022-01-01 RX ADMIN — PRASUGREL 10 MG: 10 TABLET, FILM COATED ORAL at 09:05

## 2022-01-01 RX ADMIN — CEFEPIME HYDROCHLORIDE 2 G: 2 INJECTION, SOLUTION INTRAVENOUS at 04:07

## 2022-01-01 RX ADMIN — EZETIMIBE 10 MG: 10 TABLET ORAL at 08:12

## 2022-01-01 RX ADMIN — CYANOCOBALAMIN 2000 MCG: 1000 INJECTION, SOLUTION INTRAMUSCULAR; SUBCUTANEOUS at 03:09

## 2022-01-01 RX ADMIN — FUROSEMIDE 80 MG: 80 TABLET ORAL at 09:08

## 2022-01-01 RX ADMIN — METRONIDAZOLE 500 MG: 500 INJECTION, SOLUTION INTRAVENOUS at 12:05

## 2022-01-01 RX ADMIN — FUROSEMIDE 20 MG: 20 TABLET ORAL at 09:06

## 2022-01-01 RX ADMIN — CLOPIDOGREL BISULFATE 75 MG: 75 TABLET, FILM COATED ORAL at 11:06

## 2022-01-01 RX ADMIN — SCOPALAMINE 1 PATCH: 1 PATCH, EXTENDED RELEASE TRANSDERMAL at 03:05

## 2022-01-01 RX ADMIN — TRAMADOL HYDROCHLORIDE 50 MG: 50 TABLET, COATED ORAL at 02:08

## 2022-01-01 RX ADMIN — METRONIDAZOLE 500 MG: 500 TABLET ORAL at 09:05

## 2022-01-01 RX ADMIN — METOPROLOL SUCCINATE 25 MG: 25 TABLET, EXTENDED RELEASE ORAL at 09:12

## 2022-01-01 RX ADMIN — PSYLLIUM HUSK 1 PACKET: 3.4 POWDER ORAL at 09:12

## 2022-01-01 RX ADMIN — HYDROCODONE BITARTRATE AND ACETAMINOPHEN 1 TABLET: 5; 325 TABLET ORAL at 02:08

## 2022-01-01 RX ADMIN — DOCUSATE SODIUM 100 MG: 100 CAPSULE, LIQUID FILLED ORAL at 10:08

## 2022-01-01 RX ADMIN — FUROSEMIDE 40 MG: 40 TABLET ORAL at 05:08

## 2022-01-01 RX ADMIN — ALPRAZOLAM 0.5 MG: 0.5 TABLET ORAL at 01:06

## 2022-01-01 RX ADMIN — Medication 400 MG: at 08:09

## 2022-01-01 RX ADMIN — POLYETHYLENE GLYCOL 3350 17 G: 17 POWDER, FOR SOLUTION ORAL at 08:08

## 2022-01-01 RX ADMIN — DOCUSATE SODIUM 100 MG: 100 CAPSULE, LIQUID FILLED ORAL at 09:07

## 2022-01-01 RX ADMIN — SIMETHICONE 80 MG: 80 TABLET, CHEWABLE ORAL at 05:08

## 2022-01-01 RX ADMIN — FUROSEMIDE 20 MG: 10 INJECTION, SOLUTION INTRAMUSCULAR; INTRAVENOUS at 12:05

## 2022-01-01 RX ADMIN — ONDANSETRON 4 MG: 2 INJECTION INTRAMUSCULAR; INTRAVENOUS at 01:05

## 2022-01-01 RX ADMIN — PROCHLORPERAZINE EDISYLATE 2.5 MG: 5 INJECTION INTRAMUSCULAR; INTRAVENOUS at 04:05

## 2022-01-01 RX ADMIN — ONDANSETRON 8 MG: 2 INJECTION INTRAMUSCULAR; INTRAVENOUS at 11:08

## 2022-01-01 RX ADMIN — TRAMADOL HYDROCHLORIDE 50 MG: 50 TABLET, COATED ORAL at 03:08

## 2022-01-01 RX ADMIN — SIMETHICONE 80 MG: 80 TABLET, CHEWABLE ORAL at 08:05

## 2022-01-01 RX ADMIN — EZETIMIBE 10 MG: 10 TABLET ORAL at 09:08

## 2022-01-01 RX ADMIN — CARVEDILOL 3.12 MG: 3.12 TABLET, FILM COATED ORAL at 11:06

## 2022-01-01 RX ADMIN — PANTOPRAZOLE SODIUM 40 MG: 40 TABLET, DELAYED RELEASE ORAL at 08:07

## 2022-01-01 RX ADMIN — TRAMADOL HYDROCHLORIDE 50 MG: 50 TABLET, COATED ORAL at 12:05

## 2022-01-01 RX ADMIN — PANTOPRAZOLE SODIUM 40 MG: 40 TABLET, DELAYED RELEASE ORAL at 09:07

## 2022-01-01 RX ADMIN — METOPROLOL SUCCINATE 25 MG: 25 TABLET, EXTENDED RELEASE ORAL at 10:12

## 2022-01-01 RX ADMIN — SPIRONOLACTONE 12.5 MG: 25 TABLET, FILM COATED ORAL at 08:06

## 2022-01-01 RX ADMIN — MAGNESIUM SULFATE 2 G: 2 INJECTION INTRAVENOUS at 09:05

## 2022-01-01 RX ADMIN — SPIRONOLACTONE 12.5 MG: 25 TABLET, FILM COATED ORAL at 10:06

## 2022-01-01 RX ADMIN — POLYETHYLENE GLYCOL 3350 17 G: 17 POWDER, FOR SOLUTION ORAL at 01:09

## 2022-01-01 RX ADMIN — TRAMADOL HYDROCHLORIDE 50 MG: 50 TABLET, COATED ORAL at 12:06

## 2022-01-01 RX ADMIN — POTASSIUM BICARBONATE 25 MEQ: 978 TABLET, EFFERVESCENT ORAL at 06:05

## 2022-01-01 RX ADMIN — FUROSEMIDE 40 MG: 10 INJECTION, SOLUTION INTRAMUSCULAR; INTRAVENOUS at 01:07

## 2022-01-01 RX ADMIN — OXYCODONE 5 MG: 5 TABLET ORAL at 04:05

## 2022-01-01 RX ADMIN — COLLAGENASE SANTYL: 250 OINTMENT TOPICAL at 09:07

## 2022-01-01 RX ADMIN — ALPRAZOLAM 0.5 MG: 0.5 TABLET ORAL at 09:06

## 2022-01-01 RX ADMIN — TRAMADOL HYDROCHLORIDE 50 MG: 50 TABLET, COATED ORAL at 11:09

## 2022-01-01 RX ADMIN — POTASSIUM BICARBONATE 50 MEQ: 978 TABLET, EFFERVESCENT ORAL at 09:05

## 2022-01-01 RX ADMIN — POTASSIUM BICARBONATE 50 MEQ: 978 TABLET, EFFERVESCENT ORAL at 12:06

## 2022-01-01 RX ADMIN — CEFEPIME HYDROCHLORIDE 2 G: 2 INJECTION, SOLUTION INTRAVENOUS at 11:05

## 2022-01-01 RX ADMIN — CARVEDILOL 3.12 MG: 3.12 TABLET, FILM COATED ORAL at 07:06

## 2022-01-01 RX ADMIN — SIMETHICONE 80 MG: 80 TABLET, CHEWABLE ORAL at 06:08

## 2022-01-01 RX ADMIN — HYDROCODONE BITARTRATE AND ACETAMINOPHEN 1 TABLET: 5; 325 TABLET ORAL at 01:07

## 2022-01-01 RX ADMIN — SPIRONOLACTONE 12.5 MG: 25 TABLET, FILM COATED ORAL at 09:06

## 2022-01-01 RX ADMIN — ALPRAZOLAM 0.5 MG: 0.5 TABLET ORAL at 09:12

## 2022-01-01 RX ADMIN — METRONIDAZOLE 500 MG: 500 INJECTION, SOLUTION INTRAVENOUS at 07:05

## 2022-01-01 RX ADMIN — HEPARIN SODIUM 5000 UNITS: 5000 INJECTION INTRAVENOUS; SUBCUTANEOUS at 05:05

## 2022-01-01 RX ADMIN — FAMOTIDINE 20 MG: 20 TABLET ORAL at 12:09

## 2022-01-01 RX ADMIN — VANCOMYCIN HYDROCHLORIDE 1500 MG: 1.5 INJECTION, POWDER, LYOPHILIZED, FOR SOLUTION INTRAVENOUS at 07:08

## 2022-01-01 RX ADMIN — OXYCODONE 5 MG: 5 TABLET ORAL at 03:05

## 2022-01-01 RX ADMIN — HYDROCODONE BITARTRATE AND ACETAMINOPHEN 1 TABLET: 5; 325 TABLET ORAL at 09:08

## 2022-01-01 RX ADMIN — FUROSEMIDE 40 MG: 40 TABLET ORAL at 10:12

## 2022-01-01 RX ADMIN — TRAMADOL HYDROCHLORIDE 50 MG: 50 TABLET, COATED ORAL at 03:09

## 2022-01-01 RX ADMIN — FUROSEMIDE 20 MG: 20 TABLET ORAL at 10:06

## 2022-01-01 RX ADMIN — FUROSEMIDE 40 MG: 10 INJECTION, SOLUTION INTRAMUSCULAR; INTRAVENOUS at 10:06

## 2022-01-01 RX ADMIN — TRAMADOL HYDROCHLORIDE 50 MG: 50 TABLET, COATED ORAL at 08:05

## 2022-01-01 RX ADMIN — TRAMADOL HYDROCHLORIDE 50 MG: 50 TABLET, COATED ORAL at 06:12

## 2022-01-01 RX ADMIN — SIMETHICONE 80 MG: 80 TABLET, CHEWABLE ORAL at 02:08

## 2022-01-01 RX ADMIN — POTASSIUM CHLORIDE 40 MEQ: 1500 TABLET, EXTENDED RELEASE ORAL at 09:08

## 2022-01-01 RX ADMIN — PANTOPRAZOLE SODIUM 40 MG: 40 TABLET, DELAYED RELEASE ORAL at 11:06

## 2022-01-01 RX ADMIN — SIMETHICONE 80 MG: 80 TABLET, CHEWABLE ORAL at 05:06

## 2022-01-01 RX ADMIN — SODIUM CHLORIDE, SODIUM LACTATE, POTASSIUM CHLORIDE, AND CALCIUM CHLORIDE 250 ML: .6; .31; .03; .02 INJECTION, SOLUTION INTRAVENOUS at 12:07

## 2022-01-01 RX ADMIN — PROMETHAZINE HYDROCHLORIDE 12.5 MG: 25 INJECTION INTRAMUSCULAR; INTRAVENOUS at 02:08

## 2022-01-01 RX ADMIN — SODIUM CHLORIDE: 0.9 INJECTION, SOLUTION INTRAVENOUS at 03:05

## 2022-01-01 RX ADMIN — MAGNESIUM SULFATE 1 G: 1 INJECTION INTRAVENOUS at 12:06

## 2022-01-01 RX ADMIN — SIMETHICONE 160 MG: 80 TABLET, CHEWABLE ORAL at 08:09

## 2022-01-01 RX ADMIN — VANCOMYCIN HYDROCHLORIDE 750 MG: 750 INJECTION, POWDER, LYOPHILIZED, FOR SOLUTION INTRAVENOUS at 10:06

## 2022-01-01 RX ADMIN — LIDOCAINE 1 PATCH: 50 PATCH CUTANEOUS at 09:06

## 2022-01-01 RX ADMIN — FUROSEMIDE 40 MG: 10 INJECTION, SOLUTION INTRAMUSCULAR; INTRAVENOUS at 09:08

## 2022-01-01 RX ADMIN — SIMETHICONE 80 MG: 80 TABLET, CHEWABLE ORAL at 06:06

## 2022-01-01 RX ADMIN — ASPIRIN 81 MG: 81 TABLET, COATED ORAL at 09:07

## 2022-01-01 RX ADMIN — POTASSIUM BICARBONATE 25 MEQ: 978 TABLET, EFFERVESCENT ORAL at 01:05

## 2022-01-01 RX ADMIN — IOHEXOL 75 ML: 350 INJECTION, SOLUTION INTRAVENOUS at 09:08

## 2022-01-01 RX ADMIN — DOCUSATE SODIUM 100 MG: 100 CAPSULE, LIQUID FILLED ORAL at 10:07

## 2022-01-01 RX ADMIN — ONDANSETRON 4 MG: 2 INJECTION INTRAMUSCULAR; INTRAVENOUS at 08:05

## 2022-01-01 RX ADMIN — FUROSEMIDE 40 MG: 10 INJECTION, SOLUTION INTRAMUSCULAR; INTRAVENOUS at 12:05

## 2022-01-01 RX ADMIN — HYDROCODONE BITARTRATE AND ACETAMINOPHEN 1 TABLET: 5; 325 TABLET ORAL at 08:07

## 2022-01-01 RX ADMIN — CARVEDILOL 3.12 MG: 3.12 TABLET, FILM COATED ORAL at 09:06

## 2022-01-01 RX ADMIN — MAGNESIUM SULFATE 2 G: 2 INJECTION INTRAVENOUS at 12:05

## 2022-01-01 RX ADMIN — CEFEPIME HYDROCHLORIDE 2 G: 2 INJECTION, SOLUTION INTRAVENOUS at 07:05

## 2022-01-01 RX ADMIN — POTASSIUM & SODIUM PHOSPHATES POWDER PACK 280-160-250 MG 2 PACKET: 280-160-250 PACK at 10:05

## 2022-01-01 RX ADMIN — ONDANSETRON 4 MG: 2 INJECTION INTRAMUSCULAR; INTRAVENOUS at 09:08

## 2022-01-01 RX ADMIN — TOLVAPTAN 30 MG: 30 TABLET ORAL at 10:09

## 2022-01-01 RX ADMIN — CLOPIDOGREL 75 MG: 75 TABLET, FILM COATED ORAL at 10:09

## 2022-01-01 RX ADMIN — ONDANSETRON 4 MG: 2 INJECTION INTRAMUSCULAR; INTRAVENOUS at 12:07

## 2022-01-01 RX ADMIN — PANTOPRAZOLE SODIUM 40 MG: 40 TABLET, DELAYED RELEASE ORAL at 08:09

## 2022-01-01 RX ADMIN — TRAMADOL HYDROCHLORIDE 50 MG: 50 TABLET, COATED ORAL at 09:06

## 2022-01-01 RX ADMIN — COLLAGENASE SANTYL: 250 OINTMENT TOPICAL at 08:08

## 2022-01-01 RX ADMIN — ALPRAZOLAM 0.5 MG: 0.5 TABLET ORAL at 10:08

## 2022-01-01 RX ADMIN — HEPARIN SODIUM 5000 UNITS: 5000 INJECTION INTRAVENOUS; SUBCUTANEOUS at 01:05

## 2022-01-01 RX ADMIN — CLOPIDOGREL 75 MG: 75 TABLET, FILM COATED ORAL at 08:07

## 2022-01-01 RX ADMIN — ONDANSETRON HYDROCHLORIDE 4 MG: 4 TABLET, FILM COATED ORAL at 11:06

## 2022-01-01 RX ADMIN — ALPRAZOLAM 0.5 MG: 0.5 TABLET ORAL at 11:05

## 2022-01-01 RX ADMIN — EZETIMIBE 10 MG: 10 TABLET ORAL at 08:05

## 2022-01-01 RX ADMIN — CARVEDILOL 3.12 MG: 3.12 TABLET, FILM COATED ORAL at 10:05

## 2022-01-01 RX ADMIN — MAGNESIUM HYDROXIDE 2400 MG: 400 SUSPENSION ORAL at 10:05

## 2022-01-01 RX ADMIN — PROCHLORPERAZINE EDISYLATE 2.5 MG: 5 INJECTION INTRAMUSCULAR; INTRAVENOUS at 05:05

## 2022-01-01 RX ADMIN — ALPRAZOLAM 0.5 MG: 0.5 TABLET ORAL at 08:06

## 2022-01-01 RX ADMIN — MIDODRINE HYDROCHLORIDE 5 MG: 5 TABLET ORAL at 05:09

## 2022-01-01 RX ADMIN — METRONIDAZOLE 500 MG: 500 INJECTION, SOLUTION INTRAVENOUS at 07:07

## 2022-01-01 RX ADMIN — ONDANSETRON 8 MG: 2 INJECTION INTRAMUSCULAR; INTRAVENOUS at 07:08

## 2022-01-01 RX ADMIN — HEPARIN SODIUM 5000 UNITS: 5000 INJECTION INTRAVENOUS; SUBCUTANEOUS at 02:05

## 2022-01-01 RX ADMIN — POLYETHYLENE GLYCOL 3350 17 G: 17 POWDER, FOR SOLUTION ORAL at 09:06

## 2022-01-01 RX ADMIN — PRASUGREL 10 MG: 10 TABLET, FILM COATED ORAL at 08:05

## 2022-01-01 RX ADMIN — FAMOTIDINE 20 MG: 20 TABLET ORAL at 10:09

## 2022-01-01 RX ADMIN — ALPRAZOLAM 0.5 MG: 0.5 TABLET ORAL at 08:08

## 2022-01-01 RX ADMIN — TRAMADOL HYDROCHLORIDE 50 MG: 50 TABLET, COATED ORAL at 11:06

## 2022-01-01 RX ADMIN — FLUTICASONE PROPIONATE 100 MCG: 50 SPRAY, METERED NASAL at 09:07

## 2022-01-01 RX ADMIN — ASPIRIN 81 MG: 81 TABLET, COATED ORAL at 08:07

## 2022-01-01 RX ADMIN — FUROSEMIDE 40 MG: 20 TABLET ORAL at 10:05

## 2022-01-01 RX ADMIN — PROMETHAZINE HYDROCHLORIDE 6.25 MG: 25 INJECTION INTRAMUSCULAR; INTRAVENOUS at 10:08

## 2022-01-01 RX ADMIN — VANCOMYCIN HYDROCHLORIDE 1250 MG: 1.25 INJECTION, POWDER, LYOPHILIZED, FOR SOLUTION INTRAVENOUS at 02:06

## 2022-01-01 RX ADMIN — CLOPIDOGREL BISULFATE 75 MG: 75 TABLET ORAL at 10:12

## 2022-01-01 RX ADMIN — POTASSIUM CHLORIDE 40 MEQ: 1500 TABLET, EXTENDED RELEASE ORAL at 11:07

## 2022-01-01 RX ADMIN — FLUCONAZOLE IN SODIUM CHLORIDE 400 MG: 2 INJECTION, SOLUTION INTRAVENOUS at 05:05

## 2022-01-01 RX ADMIN — TRAMADOL HYDROCHLORIDE 50 MG: 50 TABLET, COATED ORAL at 08:06

## 2022-01-01 RX ADMIN — DOCUSATE SODIUM 100 MG: 100 CAPSULE, LIQUID FILLED ORAL at 10:06

## 2022-01-01 RX ADMIN — ALPRAZOLAM 0.5 MG: 0.5 TABLET ORAL at 01:09

## 2022-01-01 RX ADMIN — MAGNESIUM SULFATE HEPTAHYDRATE 2 G: 40 INJECTION, SOLUTION INTRAVENOUS at 11:06

## 2022-01-01 RX ADMIN — OXYCODONE HYDROCHLORIDE 10 MG: 10 TABLET ORAL at 05:05

## 2022-01-01 RX ADMIN — ACETAMINOPHEN 1000 MG: 500 TABLET ORAL at 02:05

## 2022-01-01 RX ADMIN — EZETIMIBE 10 MG: 10 TABLET ORAL at 10:06

## 2022-01-01 RX ADMIN — FUROSEMIDE 40 MG: 40 TABLET ORAL at 06:09

## 2022-01-01 RX ADMIN — SIMETHICONE 80 MG: 80 TABLET, CHEWABLE ORAL at 11:06

## 2022-01-01 RX ADMIN — MIDODRINE HYDROCHLORIDE 2.5 MG: 2.5 TABLET ORAL at 05:08

## 2022-01-01 RX ADMIN — TRAMADOL HYDROCHLORIDE 50 MG: 50 TABLET, COATED ORAL at 01:12

## 2022-01-01 RX ADMIN — CLOPIDOGREL 75 MG: 75 TABLET, FILM COATED ORAL at 09:05

## 2022-01-01 RX ADMIN — CARVEDILOL 3.12 MG: 3.12 TABLET, FILM COATED ORAL at 05:07

## 2022-01-01 RX ADMIN — SCOPALAMINE 1 PATCH: 1 PATCH, EXTENDED RELEASE TRANSDERMAL at 04:05

## 2022-01-01 RX ADMIN — SIMETHICONE 160 MG: 80 TABLET, CHEWABLE ORAL at 02:09

## 2022-01-01 RX ADMIN — MAGNESIUM SULFATE HEPTAHYDRATE 2 G: 40 INJECTION, SOLUTION INTRAVENOUS at 01:05

## 2022-01-01 RX ADMIN — FUROSEMIDE 40 MG: 40 TABLET ORAL at 08:08

## 2022-01-01 RX ADMIN — FUROSEMIDE 40 MG: 10 INJECTION, SOLUTION INTRAMUSCULAR; INTRAVENOUS at 02:08

## 2022-01-01 RX ADMIN — SIMETHICONE 80 MG: 80 TABLET, CHEWABLE ORAL at 01:08

## 2022-01-01 RX ADMIN — CLOPIDOGREL 75 MG: 75 TABLET, FILM COATED ORAL at 10:08

## 2022-01-01 RX ADMIN — ONDANSETRON 4 MG: 2 INJECTION INTRAMUSCULAR; INTRAVENOUS at 08:07

## 2022-01-01 RX ADMIN — ASPIRIN 81 MG: 81 TABLET, COATED ORAL at 10:09

## 2022-01-01 RX ADMIN — ALPRAZOLAM 0.5 MG: 0.5 TABLET ORAL at 10:06

## 2022-01-01 RX ADMIN — COLLAGENASE SANTYL: 250 OINTMENT TOPICAL at 08:07

## 2022-01-01 RX ADMIN — VANCOMYCIN HYDROCHLORIDE 1250 MG: 1.25 INJECTION, POWDER, LYOPHILIZED, FOR SOLUTION INTRAVENOUS at 04:06

## 2022-01-01 RX ADMIN — PRASUGREL 10 MG: 10 TABLET, FILM COATED ORAL at 11:05

## 2022-01-01 RX ADMIN — FUROSEMIDE 40 MG: 10 INJECTION, SOLUTION INTRAMUSCULAR; INTRAVENOUS at 01:05

## 2022-01-01 RX ADMIN — CARVEDILOL 12.5 MG: 12.5 TABLET, FILM COATED ORAL at 11:05

## 2022-01-01 RX ADMIN — POLYETHYLENE GLYCOL 3350 17 G: 17 POWDER, FOR SOLUTION ORAL at 08:05

## 2022-01-01 RX ADMIN — CARVEDILOL 12.5 MG: 12.5 TABLET, FILM COATED ORAL at 07:05

## 2022-01-01 RX ADMIN — FLUTICASONE PROPIONATE 50 MCG: 50 SPRAY, METERED NASAL at 11:06

## 2022-01-01 RX ADMIN — ALPRAZOLAM 0.5 MG: 0.5 TABLET ORAL at 12:06

## 2022-01-01 RX ADMIN — METRONIDAZOLE 500 MG: 500 INJECTION, SOLUTION INTRAVENOUS at 01:07

## 2022-01-01 RX ADMIN — TOLVAPTAN 30 MG: 30 TABLET ORAL at 11:08

## 2022-01-01 RX ADMIN — ONDANSETRON 4 MG: 2 INJECTION INTRAMUSCULAR; INTRAVENOUS at 03:06

## 2022-01-01 RX ADMIN — ONDANSETRON HYDROCHLORIDE 4 MG: 4 TABLET, FILM COATED ORAL at 09:06

## 2022-01-01 RX ADMIN — MUPIROCIN: 20 OINTMENT TOPICAL at 09:06

## 2022-01-01 RX ADMIN — ALPRAZOLAM 0.5 MG: 0.5 TABLET ORAL at 11:09

## 2022-01-01 RX ADMIN — MORPHINE SULFATE 4 MG: 4 INJECTION INTRAVENOUS at 12:08

## 2022-01-01 RX ADMIN — SIMETHICONE 80 MG: 80 TABLET, CHEWABLE ORAL at 01:06

## 2022-01-01 RX ADMIN — POLYETHYLENE GLYCOL 3350 17 G: 17 POWDER, FOR SOLUTION ORAL at 11:06

## 2022-01-01 RX ADMIN — MIDODRINE HYDROCHLORIDE 5 MG: 5 TABLET ORAL at 08:09

## 2022-01-01 RX ADMIN — ONDANSETRON 8 MG: 2 INJECTION INTRAMUSCULAR; INTRAVENOUS at 09:08

## 2022-01-01 RX ADMIN — POTASSIUM & SODIUM PHOSPHATES POWDER PACK 280-160-250 MG 1 PACKET: 280-160-250 PACK at 09:05

## 2022-01-01 RX ADMIN — MECLIZINE HYDROCHLORIDE 12.5 MG: 12.5 TABLET ORAL at 09:05

## 2022-01-01 RX ADMIN — PANTOPRAZOLE SODIUM 40 MG: 40 TABLET, DELAYED RELEASE ORAL at 08:06

## 2022-01-01 RX ADMIN — VANCOMYCIN HYDROCHLORIDE 750 MG: 750 INJECTION, POWDER, LYOPHILIZED, FOR SOLUTION INTRAVENOUS at 12:06

## 2022-01-01 RX ADMIN — SIMETHICONE 80 MG: 80 TABLET, CHEWABLE ORAL at 07:06

## 2022-01-01 RX ADMIN — LINEZOLID 600 MG: 600 INJECTION, SOLUTION INTRAVENOUS at 04:05

## 2022-01-01 RX ADMIN — ASPIRIN 81 MG: 81 TABLET, COATED ORAL at 10:05

## 2022-01-01 RX ADMIN — METRONIDAZOLE 500 MG: 500 INJECTION, SOLUTION INTRAVENOUS at 09:07

## 2022-01-01 RX ADMIN — ONDANSETRON 4 MG: 2 INJECTION INTRAMUSCULAR; INTRAVENOUS at 07:05

## 2022-01-01 RX ADMIN — ASPIRIN 81 MG: 81 TABLET, COATED ORAL at 11:06

## 2022-01-01 RX ADMIN — PSYLLIUM HUSK 1 PACKET: 3.4 POWDER ORAL at 08:12

## 2022-01-01 RX ADMIN — ONDANSETRON 8 MG: 2 INJECTION INTRAMUSCULAR; INTRAVENOUS at 05:08

## 2022-01-01 RX ADMIN — ALPRAZOLAM 0.5 MG: 0.5 TABLET ORAL at 09:09

## 2022-01-01 RX ADMIN — POLYETHYLENE GLYCOL 3350 17 G: 17 POWDER, FOR SOLUTION ORAL at 01:08

## 2022-01-01 RX ADMIN — POTASSIUM CHLORIDE 10 MEQ: 7.46 INJECTION, SOLUTION INTRAVENOUS at 04:05

## 2022-01-01 RX ADMIN — ONDANSETRON 4 MG: 4 TABLET, ORALLY DISINTEGRATING ORAL at 03:06

## 2022-01-01 RX ADMIN — PROCHLORPERAZINE EDISYLATE 2.5 MG: 5 INJECTION INTRAMUSCULAR; INTRAVENOUS at 11:05

## 2022-01-01 RX ADMIN — POTASSIUM CHLORIDE 20 MEQ: 1500 TABLET, EXTENDED RELEASE ORAL at 10:06

## 2022-01-01 RX ADMIN — CARVEDILOL 3.12 MG: 3.12 TABLET, FILM COATED ORAL at 06:06

## 2022-01-01 RX ADMIN — TRAMADOL HYDROCHLORIDE 50 MG: 50 TABLET, COATED ORAL at 10:06

## 2022-01-01 RX ADMIN — METRONIDAZOLE 500 MG: 500 INJECTION, SOLUTION INTRAVENOUS at 08:05

## 2022-01-01 RX ADMIN — HUMAN ALBUMIN MICROSPHERES AND PERFLUTREN 0.66 MG: 10; .22 INJECTION, SOLUTION INTRAVENOUS at 08:12

## 2022-01-01 RX ADMIN — HUMAN ALBUMIN MICROSPHERES AND PERFLUTREN 0.66 MG: 10; .22 INJECTION, SOLUTION INTRAVENOUS at 10:05

## 2022-01-01 RX ADMIN — ALTEPLASE 2 MG: 2.2 INJECTION, POWDER, LYOPHILIZED, FOR SOLUTION INTRAVENOUS at 09:05

## 2022-01-01 RX ADMIN — TRAMADOL HYDROCHLORIDE 50 MG: 50 TABLET, COATED ORAL at 01:05

## 2022-01-01 RX ADMIN — SPIRONOLACTONE 12.5 MG: 25 TABLET, FILM COATED ORAL at 01:05

## 2022-01-01 RX ADMIN — FUROSEMIDE 20 MG: 20 TABLET ORAL at 08:09

## 2022-01-01 RX ADMIN — PROCHLORPERAZINE EDISYLATE 2.5 MG: 5 INJECTION INTRAMUSCULAR; INTRAVENOUS at 12:05

## 2022-01-01 RX ADMIN — Medication 400 MG: at 10:09

## 2022-01-01 RX ADMIN — SODIUM CHLORIDE: 0.9 INJECTION, SOLUTION INTRAVENOUS at 05:05

## 2022-01-01 RX ADMIN — ACETAMINOPHEN 1000 MG: 500 TABLET ORAL at 06:05

## 2022-01-01 RX ADMIN — SIMETHICONE 80 MG: 80 TABLET, CHEWABLE ORAL at 12:09

## 2022-01-01 RX ADMIN — SPIRONOLACTONE 12.5 MG: 25 TABLET, FILM COATED ORAL at 12:08

## 2022-01-01 RX ADMIN — TRAMADOL HYDROCHLORIDE 50 MG: 50 TABLET, COATED ORAL at 10:12

## 2022-01-01 RX ADMIN — DOXYCYCLINE HYCLATE 100 MG: 100 TABLET, COATED ORAL at 09:06

## 2022-01-01 RX ADMIN — IOHEXOL 75 ML: 350 INJECTION, SOLUTION INTRAVENOUS at 10:08

## 2022-01-01 RX ADMIN — OXYCODONE HYDROCHLORIDE 10 MG: 10 TABLET ORAL at 07:05

## 2022-01-01 RX ADMIN — SODIUM CHLORIDE 500 ML: 0.9 INJECTION, SOLUTION INTRAVENOUS at 09:08

## 2022-01-01 RX ADMIN — Medication: at 10:05

## 2022-01-01 RX ADMIN — PRASUGREL 10 MG: 10 TABLET, FILM COATED ORAL at 02:05

## 2022-01-01 RX ADMIN — HYDROXYZINE HYDROCHLORIDE 25 MG: 25 TABLET, FILM COATED ORAL at 05:08

## 2022-01-01 RX ADMIN — ENOXAPARIN SODIUM 40 MG: 100 INJECTION SUBCUTANEOUS at 06:06

## 2022-01-01 RX ADMIN — FUROSEMIDE 20 MG: 20 TABLET ORAL at 01:05

## 2022-01-01 RX ADMIN — SODIUM CHLORIDE: 0.9 INJECTION, SOLUTION INTRAVENOUS at 10:05

## 2022-01-01 RX ADMIN — POLYETHYLENE GLYCOL 3350 17 G: 17 POWDER, FOR SOLUTION ORAL at 10:05

## 2022-01-01 RX ADMIN — ENOXAPARIN SODIUM 40 MG: 100 INJECTION SUBCUTANEOUS at 06:08

## 2022-01-01 RX ADMIN — POTASSIUM BICARBONATE 25 MEQ: 978 TABLET, EFFERVESCENT ORAL at 11:08

## 2022-01-01 RX ADMIN — ONDANSETRON 8 MG: 2 INJECTION INTRAMUSCULAR; INTRAVENOUS at 07:05

## 2022-01-01 RX ADMIN — PROCHLORPERAZINE EDISYLATE 2.5 MG: 5 INJECTION INTRAMUSCULAR; INTRAVENOUS at 10:05

## 2022-01-01 RX ADMIN — HYDROCODONE BITARTRATE AND ACETAMINOPHEN 1 TABLET: 5; 325 TABLET ORAL at 10:07

## 2022-01-01 RX ADMIN — CEFEPIME HYDROCHLORIDE 2 G: 2 INJECTION, SOLUTION INTRAVENOUS at 08:07

## 2022-01-01 RX ADMIN — FUROSEMIDE 40 MG: 10 INJECTION, SOLUTION INTRAMUSCULAR; INTRAVENOUS at 03:08

## 2022-01-01 RX ADMIN — METRONIDAZOLE 500 MG: 500 TABLET ORAL at 05:05

## 2022-01-01 RX ADMIN — VANCOMYCIN HYDROCHLORIDE 750 MG: 750 INJECTION, POWDER, LYOPHILIZED, FOR SOLUTION INTRAVENOUS at 09:06

## 2022-01-01 RX ADMIN — POTASSIUM CHLORIDE 40 MEQ: 1500 TABLET, EXTENDED RELEASE ORAL at 01:08

## 2022-01-01 RX ADMIN — TRAMADOL HYDROCHLORIDE 50 MG: 50 TABLET, COATED ORAL at 02:05

## 2022-01-01 RX ADMIN — FLUTICASONE PROPIONATE 100 MCG: 50 SPRAY, METERED NASAL at 08:07

## 2022-01-01 RX ADMIN — METOPROLOL TARTRATE 12.5 MG: 25 TABLET, FILM COATED ORAL at 08:09

## 2022-01-01 RX ADMIN — PROCHLORPERAZINE EDISYLATE 2.5 MG: 5 INJECTION INTRAMUSCULAR; INTRAVENOUS at 09:05

## 2022-01-01 RX ADMIN — ONDANSETRON 4 MG: 2 INJECTION INTRAMUSCULAR; INTRAVENOUS at 06:05

## 2022-01-01 RX ADMIN — CEFEPIME HYDROCHLORIDE 2 G: 2 INJECTION, SOLUTION INTRAVENOUS at 10:05

## 2022-01-01 RX ADMIN — FLUTICASONE PROPIONATE 50 MCG: 50 SPRAY, METERED NASAL at 10:12

## 2022-01-01 RX ADMIN — IRON SUCROSE 200 MG: 20 INJECTION, SOLUTION INTRAVENOUS at 11:08

## 2022-01-01 RX ADMIN — ACETAMINOPHEN 1000 MG: 500 TABLET ORAL at 05:05

## 2022-01-01 RX ADMIN — TRAMADOL HYDROCHLORIDE 50 MG: 50 TABLET, COATED ORAL at 11:08

## 2022-01-01 RX ADMIN — POTASSIUM CHLORIDE 10 MEQ: 7.46 INJECTION, SOLUTION INTRAVENOUS at 12:05

## 2022-01-01 RX ADMIN — CARVEDILOL 3.12 MG: 3.12 TABLET, FILM COATED ORAL at 08:07

## 2022-01-01 RX ADMIN — TUBERCULIN PURIFIED PROTEIN DERIVATIVE 5 UNITS: 5 INJECTION, SOLUTION INTRADERMAL at 05:09

## 2022-01-01 RX ADMIN — TUBERCULIN PURIFIED PROTEIN DERIVATIVE 5 UNITS: 5 INJECTION, SOLUTION INTRADERMAL at 01:05

## 2022-01-01 RX ADMIN — SIMETHICONE 80 MG: 80 TABLET, CHEWABLE ORAL at 02:06

## 2022-01-01 RX ADMIN — EZETIMIBE 10 MG: 10 TABLET ORAL at 09:12

## 2022-01-01 RX ADMIN — POTASSIUM BICARBONATE 25 MEQ: 978 TABLET, EFFERVESCENT ORAL at 08:07

## 2022-01-01 RX ADMIN — FUROSEMIDE 20 MG: 10 INJECTION, SOLUTION INTRAMUSCULAR; INTRAVENOUS at 02:06

## 2022-01-01 RX ADMIN — SIMETHICONE 80 MG: 80 TABLET, CHEWABLE ORAL at 08:06

## 2022-01-01 RX ADMIN — ONDANSETRON 4 MG: 4 TABLET, ORALLY DISINTEGRATING ORAL at 02:08

## 2022-01-01 RX ADMIN — POTASSIUM CHLORIDE 10 MEQ: 7.46 INJECTION, SOLUTION INTRAVENOUS at 02:05

## 2022-01-01 RX ADMIN — CEFEPIME HYDROCHLORIDE 2 G: 2 INJECTION, SOLUTION INTRAVENOUS at 03:05

## 2022-01-01 RX ADMIN — FUROSEMIDE 40 MG: 10 INJECTION, SOLUTION INTRAMUSCULAR; INTRAVENOUS at 11:05

## 2022-01-01 RX ADMIN — HYDROCODONE BITARTRATE AND ACETAMINOPHEN 1 TABLET: 5; 325 TABLET ORAL at 01:08

## 2022-01-01 RX ADMIN — HYDROCODONE BITARTRATE AND ACETAMINOPHEN 1 TABLET: 5; 325 TABLET ORAL at 05:08

## 2022-01-01 RX ADMIN — ALPRAZOLAM 0.5 MG: 0.5 TABLET ORAL at 02:09

## 2022-01-01 RX ADMIN — SIMETHICONE 80 MG: 80 TABLET, CHEWABLE ORAL at 10:05

## 2022-01-01 RX ADMIN — CARVEDILOL 3.12 MG: 3.12 TABLET, FILM COATED ORAL at 04:06

## 2022-01-01 RX ADMIN — OXYCODONE 5 MG: 5 TABLET ORAL at 05:05

## 2022-01-01 RX ADMIN — TOLVAPTAN 15 MG: 15 TABLET ORAL at 11:08

## 2022-01-01 RX ADMIN — FAMOTIDINE 20 MG: 20 TABLET ORAL at 08:12

## 2022-01-01 RX ADMIN — MORPHINE SULFATE 2 MG: 2 INJECTION, SOLUTION INTRAMUSCULAR; INTRAVENOUS at 05:07

## 2022-01-01 RX ADMIN — HYDROCODONE BITARTRATE AND ACETAMINOPHEN 1 TABLET: 5; 325 TABLET ORAL at 11:08

## 2022-01-01 RX ADMIN — CEFEPIME HYDROCHLORIDE 2 G: 2 INJECTION, SOLUTION INTRAVENOUS at 02:07

## 2022-01-01 RX ADMIN — HYDROCODONE BITARTRATE AND ACETAMINOPHEN 1 TABLET: 5; 325 TABLET ORAL at 03:06

## 2022-01-01 RX ADMIN — ONDANSETRON 4 MG: 2 INJECTION INTRAMUSCULAR; INTRAVENOUS at 04:07

## 2022-01-01 RX ADMIN — METRONIDAZOLE 500 MG: 500 TABLET ORAL at 02:05

## 2022-01-01 RX ADMIN — ONDANSETRON 4 MG: 2 INJECTION INTRAMUSCULAR; INTRAVENOUS at 10:05

## 2022-01-01 RX ADMIN — LOSARTAN POTASSIUM 25 MG: 25 TABLET, FILM COATED ORAL at 08:12

## 2022-01-01 RX ADMIN — FUROSEMIDE 20 MG: 20 TABLET ORAL at 11:08

## 2022-01-01 RX ADMIN — ONDANSETRON 4 MG: 2 INJECTION INTRAMUSCULAR; INTRAVENOUS at 01:08

## 2022-01-01 RX ADMIN — SODIUM CHLORIDE: 0.9 INJECTION, SOLUTION INTRAVENOUS at 04:05

## 2022-01-01 RX ADMIN — PROMETHAZINE HYDROCHLORIDE 12.5 MG: 25 INJECTION INTRAMUSCULAR; INTRAVENOUS at 09:08

## 2022-01-01 RX ADMIN — FUROSEMIDE 40 MG: 40 TABLET ORAL at 10:05

## 2022-01-01 RX ADMIN — FLUCONAZOLE IN SODIUM CHLORIDE 400 MG: 2 INJECTION, SOLUTION INTRAVENOUS at 06:05

## 2022-01-01 RX ADMIN — CEFEPIME HYDROCHLORIDE 2 G: 2 INJECTION, SOLUTION INTRAVENOUS at 01:05

## 2022-01-01 RX ADMIN — LIDOCAINE 1 PATCH: 50 PATCH CUTANEOUS at 10:06

## 2022-01-01 RX ADMIN — MECLIZINE HYDROCHLORIDE 12.5 MG: 12.5 TABLET ORAL at 04:05

## 2022-01-01 RX ADMIN — SIMETHICONE 80 MG: 80 TABLET, CHEWABLE ORAL at 09:05

## 2022-01-01 RX ADMIN — HYDROCODONE BITARTRATE AND ACETAMINOPHEN 1 TABLET: 5; 325 TABLET ORAL at 03:08

## 2022-01-01 RX ADMIN — POTASSIUM & SODIUM PHOSPHATES POWDER PACK 280-160-250 MG 1 PACKET: 280-160-250 PACK at 11:06

## 2022-01-01 RX ADMIN — HEPARIN SODIUM 5000 UNITS: 5000 INJECTION INTRAVENOUS; SUBCUTANEOUS at 10:05

## 2022-01-01 RX ADMIN — FLUTICASONE PROPIONATE 50 MCG: 50 SPRAY, METERED NASAL at 08:12

## 2022-01-01 RX ADMIN — POTASSIUM BICARBONATE 40 MEQ: 391 TABLET, EFFERVESCENT ORAL at 09:08

## 2022-01-01 RX ADMIN — OXYCODONE 5 MG: 5 TABLET ORAL at 06:05

## 2022-01-01 RX ADMIN — LOSARTAN POTASSIUM 25 MG: 25 TABLET, FILM COATED ORAL at 10:09

## 2022-01-01 RX ADMIN — DOXYCYCLINE HYCLATE 100 MG: 100 TABLET, COATED ORAL at 08:06

## 2022-01-01 RX ADMIN — CEFEPIME HYDROCHLORIDE 2 G: 2 INJECTION, SOLUTION INTRAVENOUS at 03:07

## 2022-01-01 RX ADMIN — ASPIRIN 81 MG: 81 TABLET, COATED ORAL at 08:12

## 2022-01-01 RX ADMIN — POTASSIUM CHLORIDE 20 MEQ: 7.46 INJECTION, SOLUTION INTRAVENOUS at 11:07

## 2022-01-01 RX ADMIN — HYDROCODONE BITARTRATE AND ACETAMINOPHEN 1 TABLET: 5; 325 TABLET ORAL at 04:08

## 2022-01-01 RX ADMIN — OXYCODONE 5 MG: 5 TABLET ORAL at 01:05

## 2022-01-01 RX ADMIN — TRAMADOL HYDROCHLORIDE 50 MG: 50 TABLET, COATED ORAL at 11:05

## 2022-01-01 RX ADMIN — PANTOPRAZOLE SODIUM 40 MG: 40 TABLET, DELAYED RELEASE ORAL at 11:08

## 2022-01-01 RX ADMIN — LINEZOLID 600 MG: 600 TABLET, FILM COATED ORAL at 05:05

## 2022-01-01 RX ADMIN — FUROSEMIDE 40 MG: 10 INJECTION, SOLUTION INTRAMUSCULAR; INTRAVENOUS at 10:08

## 2022-01-01 RX ADMIN — SPIRONOLACTONE 25 MG: 25 TABLET, FILM COATED ORAL at 08:07

## 2022-01-01 RX ADMIN — SIMETHICONE 80 MG: 80 TABLET, CHEWABLE ORAL at 04:05

## 2022-01-01 RX ADMIN — FUROSEMIDE 20 MG: 10 INJECTION, SOLUTION INTRAMUSCULAR; INTRAVENOUS at 03:06

## 2022-01-01 RX ADMIN — METOPROLOL SUCCINATE 25 MG: 25 TABLET, EXTENDED RELEASE ORAL at 08:12

## 2022-01-01 RX ADMIN — SPIRONOLACTONE 25 MG: 25 TABLET, FILM COATED ORAL at 01:07

## 2022-01-01 RX ADMIN — ALPRAZOLAM 0.5 MG: 0.5 TABLET ORAL at 08:12

## 2022-01-01 RX ADMIN — PROMETHAZINE HYDROCHLORIDE 12.5 MG: 25 INJECTION INTRAMUSCULAR; INTRAVENOUS at 01:05

## 2022-01-01 RX ADMIN — HYDROCODONE BITARTRATE AND ACETAMINOPHEN 1 TABLET: 5; 325 TABLET ORAL at 07:07

## 2022-01-01 RX ADMIN — SPIRONOLACTONE 25 MG: 25 TABLET, FILM COATED ORAL at 12:05

## 2022-01-01 RX ADMIN — FUROSEMIDE 20 MG: 10 INJECTION, SOLUTION INTRAMUSCULAR; INTRAVENOUS at 05:06

## 2022-01-01 RX ADMIN — HEPARIN SODIUM 5000 UNITS: 5000 INJECTION INTRAVENOUS; SUBCUTANEOUS at 09:05

## 2022-01-01 RX ADMIN — TRAMADOL HYDROCHLORIDE 50 MG: 50 TABLET, COATED ORAL at 06:06

## 2022-01-01 RX ADMIN — SPIRONOLACTONE 12.5 MG: 25 TABLET, FILM COATED ORAL at 11:08

## 2022-01-01 RX ADMIN — POTASSIUM & SODIUM PHOSPHATES POWDER PACK 280-160-250 MG 1 PACKET: 280-160-250 PACK at 04:05

## 2022-01-01 RX ADMIN — TRAMADOL HYDROCHLORIDE 50 MG: 50 TABLET, COATED ORAL at 05:08

## 2022-01-01 RX ADMIN — POTASSIUM & SODIUM PHOSPHATES POWDER PACK 280-160-250 MG 2 PACKET: 280-160-250 PACK at 03:05

## 2022-01-01 RX ADMIN — SPIRONOLACTONE 25 MG: 25 TABLET, FILM COATED ORAL at 10:05

## 2022-01-01 RX ADMIN — SIMETHICONE 80 MG: 80 TABLET, CHEWABLE ORAL at 08:09

## 2022-01-01 RX ADMIN — METRONIDAZOLE 500 MG: 500 INJECTION, SOLUTION INTRAVENOUS at 08:07

## 2022-01-01 RX ADMIN — ALPRAZOLAM 0.25 MG: 0.25 TABLET ORAL at 09:05

## 2022-01-01 RX ADMIN — ONDANSETRON 8 MG: 2 INJECTION INTRAMUSCULAR; INTRAVENOUS at 04:08

## 2022-01-01 RX ADMIN — CEFEPIME HYDROCHLORIDE 2 G: 2 INJECTION, SOLUTION INTRAVENOUS at 12:07

## 2022-01-01 RX ADMIN — METRONIDAZOLE 500 MG: 500 TABLET ORAL at 03:05

## 2022-01-01 RX ADMIN — PROCHLORPERAZINE EDISYLATE 5 MG: 5 INJECTION INTRAMUSCULAR; INTRAVENOUS at 11:12

## 2022-01-01 RX ADMIN — ONDANSETRON 4 MG: 2 INJECTION INTRAMUSCULAR; INTRAVENOUS at 10:07

## 2022-01-01 RX ADMIN — PROMETHAZINE HYDROCHLORIDE 6.25 MG: 25 INJECTION INTRAMUSCULAR; INTRAVENOUS at 04:06

## 2022-01-01 RX ADMIN — CEFEPIME HYDROCHLORIDE 2 G: 2 INJECTION, SOLUTION INTRAVENOUS at 09:07

## 2022-01-01 RX ADMIN — ALPRAZOLAM 0.5 MG: 0.5 TABLET ORAL at 02:08

## 2022-01-01 RX ADMIN — HYDROCODONE BITARTRATE AND ACETAMINOPHEN 1 TABLET: 5; 325 TABLET ORAL at 07:08

## 2022-01-01 RX ADMIN — CEFEPIME HYDROCHLORIDE 2 G: 2 INJECTION, SOLUTION INTRAVENOUS at 11:07

## 2022-01-01 RX ADMIN — FUROSEMIDE 80 MG: 10 INJECTION, SOLUTION INTRAMUSCULAR; INTRAVENOUS at 11:08

## 2022-01-01 RX ADMIN — LIDOCAINE 1 PATCH: 50 PATCH CUTANEOUS at 01:05

## 2022-01-01 RX ADMIN — ALUMINUM HYDROXIDE, MAGNESIUM HYDROXIDE, AND SIMETHICONE 30 ML: 200; 200; 20 SUSPENSION ORAL at 03:05

## 2022-01-01 RX ADMIN — TOLVAPTAN 30 MG: 30 TABLET ORAL at 09:09

## 2022-01-01 RX ADMIN — METRONIDAZOLE 500 MG: 500 INJECTION, SOLUTION INTRAVENOUS at 10:05

## 2022-01-01 RX ADMIN — VANCOMYCIN HYDROCHLORIDE 1250 MG: 1.25 INJECTION, POWDER, LYOPHILIZED, FOR SOLUTION INTRAVENOUS at 06:07

## 2022-01-01 RX ADMIN — EZETIMIBE 10 MG: 10 TABLET ORAL at 10:08

## 2022-01-01 RX ADMIN — CARVEDILOL 3.12 MG: 3.12 TABLET, FILM COATED ORAL at 09:07

## 2022-01-01 RX ADMIN — POLYETHYLENE GLYCOL 3350 17 G: 17 POWDER, FOR SOLUTION ORAL at 02:05

## 2022-01-01 RX ADMIN — PANTOPRAZOLE SODIUM 40 MG: 40 TABLET, DELAYED RELEASE ORAL at 10:05

## 2022-01-01 RX ADMIN — POTASSIUM CHLORIDE 20 MEQ: 1500 TABLET, EXTENDED RELEASE ORAL at 12:05

## 2022-01-01 RX ADMIN — EZETIMIBE 10 MG: 10 TABLET ORAL at 06:08

## 2022-01-01 RX ADMIN — PRASUGREL 10 MG: 10 TABLET, FILM COATED ORAL at 10:05

## 2022-01-01 RX ADMIN — SIMETHICONE 80 MG: 80 TABLET, CHEWABLE ORAL at 05:07

## 2022-01-01 RX ADMIN — ACETAMINOPHEN 650 MG: 325 TABLET ORAL at 12:05

## 2022-01-01 RX ADMIN — ACETAMINOPHEN 1000 MG: 500 TABLET ORAL at 01:05

## 2022-01-01 RX ADMIN — ENOXAPARIN SODIUM 40 MG: 100 INJECTION SUBCUTANEOUS at 04:05

## 2022-01-01 RX ADMIN — FUROSEMIDE 40 MG: 10 INJECTION, SOLUTION INTRAMUSCULAR; INTRAVENOUS at 01:08

## 2022-01-01 RX ADMIN — POTASSIUM BICARBONATE 25 MEQ: 978 TABLET, EFFERVESCENT ORAL at 01:08

## 2022-01-01 RX ADMIN — CEFTRIAXONE 2 G: 2 INJECTION, SOLUTION INTRAVENOUS at 01:07

## 2022-01-01 RX ADMIN — MAGNESIUM SULFATE 2 G: 2 INJECTION INTRAVENOUS at 11:05

## 2022-01-01 RX ADMIN — PIPERACILLIN SODIUM AND TAZOBACTAM SODIUM 4.5 G: 4; .5 INJECTION, POWDER, LYOPHILIZED, FOR SOLUTION INTRAVENOUS at 10:08

## 2022-01-01 RX ADMIN — ALPRAZOLAM 0.5 MG: 0.5 TABLET ORAL at 12:08

## 2022-01-01 RX ADMIN — FUROSEMIDE 20 MG: 10 INJECTION, SOLUTION INTRAMUSCULAR; INTRAVENOUS at 12:06

## 2022-01-22 ENCOUNTER — HOSPITAL ENCOUNTER (INPATIENT)
Facility: OTHER | Age: 67
LOS: 4 days | Discharge: HOME | DRG: 246 | End: 2022-01-26
Attending: INTERNAL MEDICINE | Admitting: INTERNAL MEDICINE
Payer: MEDICARE

## 2022-01-22 DIAGNOSIS — I21.4 NSTEMI (NON-ST ELEVATED MYOCARDIAL INFARCTION): ICD-10-CM

## 2022-01-22 DIAGNOSIS — I50.9 CHF (CONGESTIVE HEART FAILURE): ICD-10-CM

## 2022-01-22 DIAGNOSIS — R07.9 CHEST PAIN: ICD-10-CM

## 2022-01-22 DIAGNOSIS — I50.23 HEART FAILURE, SYSTOLIC, ACUTE ON CHRONIC: ICD-10-CM

## 2022-01-22 DIAGNOSIS — I21.4 NON-ST ELEVATION MYOCARDIAL INFARCTION (NSTEMI): Primary | ICD-10-CM

## 2022-01-22 DIAGNOSIS — I25.10 CAD (CORONARY ARTERY DISEASE): ICD-10-CM

## 2022-01-22 PROBLEM — E78.5 HLD (HYPERLIPIDEMIA): Status: ACTIVE | Noted: 2022-01-22

## 2022-01-22 LAB
ANION GAP SERPL CALC-SCNC: 9 MMOL/L (ref 8–16)
APTT BLDCRRT: 36.3 SEC (ref 21–32)
BUN SERPL-MCNC: 20 MG/DL (ref 8–23)
CALCIUM SERPL-MCNC: 8.6 MG/DL (ref 8.7–10.5)
CHLORIDE SERPL-SCNC: 97 MMOL/L (ref 95–110)
CO2 SERPL-SCNC: 27 MMOL/L (ref 23–29)
CREAT SERPL-MCNC: 0.7 MG/DL (ref 0.5–1.4)
EST. GFR  (AFRICAN AMERICAN): >60 ML/MIN/1.73 M^2
EST. GFR  (NON AFRICAN AMERICAN): >60 ML/MIN/1.73 M^2
GLUCOSE SERPL-MCNC: 112 MG/DL (ref 70–110)
MAGNESIUM SERPL-MCNC: 1.9 MG/DL (ref 1.6–2.6)
POTASSIUM SERPL-SCNC: 4.6 MMOL/L (ref 3.5–5.1)
SODIUM SERPL-SCNC: 133 MMOL/L (ref 136–145)
TROPONIN I SERPL DL<=0.01 NG/ML-MCNC: 4.95 NG/ML (ref 0–0.03)

## 2022-01-22 PROCEDURE — 83735 ASSAY OF MAGNESIUM: CPT | Performed by: PHYSICIAN ASSISTANT

## 2022-01-22 PROCEDURE — 25000003 PHARM REV CODE 250: Performed by: FAMILY MEDICINE

## 2022-01-22 PROCEDURE — 80048 BASIC METABOLIC PNL TOTAL CA: CPT | Performed by: PHYSICIAN ASSISTANT

## 2022-01-22 PROCEDURE — 93010 ELECTROCARDIOGRAM REPORT: CPT | Mod: ,,, | Performed by: INTERNAL MEDICINE

## 2022-01-22 PROCEDURE — 20000000 HC ICU ROOM

## 2022-01-22 PROCEDURE — 25000003 PHARM REV CODE 250: Performed by: PHYSICIAN ASSISTANT

## 2022-01-22 PROCEDURE — 85025 COMPLETE CBC W/AUTO DIFF WBC: CPT | Performed by: INTERNAL MEDICINE

## 2022-01-22 PROCEDURE — 36415 COLL VENOUS BLD VENIPUNCTURE: CPT | Performed by: INTERNAL MEDICINE

## 2022-01-22 PROCEDURE — 94761 N-INVAS EAR/PLS OXIMETRY MLT: CPT

## 2022-01-22 PROCEDURE — 85730 THROMBOPLASTIN TIME PARTIAL: CPT | Mod: 91 | Performed by: INTERNAL MEDICINE

## 2022-01-22 PROCEDURE — 63600175 PHARM REV CODE 636 W HCPCS: Performed by: FAMILY MEDICINE

## 2022-01-22 PROCEDURE — 93010 EKG 12-LEAD: ICD-10-PCS | Mod: ,,, | Performed by: INTERNAL MEDICINE

## 2022-01-22 PROCEDURE — 84484 ASSAY OF TROPONIN QUANT: CPT | Mod: 91 | Performed by: PHYSICIAN ASSISTANT

## 2022-01-22 PROCEDURE — 93005 ELECTROCARDIOGRAM TRACING: CPT

## 2022-01-22 RX ORDER — SCOLOPAMINE TRANSDERMAL SYSTEM 1 MG/1
1 PATCH, EXTENDED RELEASE TRANSDERMAL
Status: DISCONTINUED | OUTPATIENT
Start: 2022-01-22 | End: 2022-01-25

## 2022-01-22 RX ORDER — NITROGLYCERIN 0.4 MG/1
0.4 TABLET SUBLINGUAL EVERY 5 MIN PRN
Status: DISCONTINUED | OUTPATIENT
Start: 2022-01-22 | End: 2022-01-26 | Stop reason: HOSPADM

## 2022-01-22 RX ORDER — SODIUM CHLORIDE 0.9 % (FLUSH) 0.9 %
10 SYRINGE (ML) INJECTION
Status: DISCONTINUED | OUTPATIENT
Start: 2022-01-22 | End: 2022-01-25

## 2022-01-22 RX ORDER — ACETAMINOPHEN 325 MG/1
650 TABLET ORAL EVERY 6 HOURS PRN
Status: DISCONTINUED | OUTPATIENT
Start: 2022-01-22 | End: 2022-01-25

## 2022-01-22 RX ORDER — PRAVASTATIN SODIUM 20 MG/1
40 TABLET ORAL DAILY
Status: DISCONTINUED | OUTPATIENT
Start: 2022-01-23 | End: 2022-01-26 | Stop reason: HOSPADM

## 2022-01-22 RX ORDER — MUPIROCIN 20 MG/G
OINTMENT TOPICAL 2 TIMES DAILY
Status: DISCONTINUED | OUTPATIENT
Start: 2022-01-22 | End: 2022-01-26 | Stop reason: HOSPADM

## 2022-01-22 RX ORDER — CARVEDILOL 12.5 MG/1
12.5 TABLET ORAL 2 TIMES DAILY
Status: DISCONTINUED | OUTPATIENT
Start: 2022-01-22 | End: 2022-01-26

## 2022-01-22 RX ORDER — EZETIMIBE 10 MG/1
10 TABLET ORAL DAILY
Status: DISCONTINUED | OUTPATIENT
Start: 2022-01-23 | End: 2022-01-26 | Stop reason: HOSPADM

## 2022-01-22 RX ORDER — ASPIRIN 81 MG/1
81 TABLET ORAL DAILY
Status: DISCONTINUED | OUTPATIENT
Start: 2022-01-23 | End: 2022-01-26 | Stop reason: HOSPADM

## 2022-01-22 RX ORDER — PRAVASTATIN SODIUM 20 MG/1
40 TABLET ORAL DAILY
Status: DISCONTINUED | OUTPATIENT
Start: 2022-01-23 | End: 2022-01-22 | Stop reason: ALTCHOICE

## 2022-01-22 RX ORDER — NITROGLYCERIN 80 MG/1
1 PATCH TRANSDERMAL DAILY
Status: DISCONTINUED | OUTPATIENT
Start: 2022-01-23 | End: 2022-01-24

## 2022-01-22 RX ORDER — HEPARIN SODIUM,PORCINE/D5W 25000/250
0-40 INTRAVENOUS SOLUTION INTRAVENOUS CONTINUOUS
Status: DISCONTINUED | OUTPATIENT
Start: 2022-01-22 | End: 2022-01-24

## 2022-01-22 RX ORDER — PANTOPRAZOLE SODIUM 40 MG/1
40 TABLET, DELAYED RELEASE ORAL DAILY
Status: DISCONTINUED | OUTPATIENT
Start: 2022-01-23 | End: 2022-01-26 | Stop reason: HOSPADM

## 2022-01-22 RX ORDER — FUROSEMIDE 10 MG/ML
40 INJECTION INTRAMUSCULAR; INTRAVENOUS DAILY
Status: COMPLETED | OUTPATIENT
Start: 2022-01-23 | End: 2022-01-23

## 2022-01-22 RX ADMIN — HEPARIN SODIUM 12 UNITS/KG/HR: 5000 INJECTION INTRAVENOUS; SUBCUTANEOUS at 08:01

## 2022-01-22 RX ADMIN — CARVEDILOL 12.5 MG: 12.5 TABLET, FILM COATED ORAL at 09:01

## 2022-01-22 RX ADMIN — MUPIROCIN: 20 OINTMENT TOPICAL at 09:01

## 2022-01-22 RX ADMIN — SCOPALAMINE 1 PATCH: 1 PATCH, EXTENDED RELEASE TRANSDERMAL at 10:01

## 2022-01-22 NOTE — Clinical Note
An angiography was performed of the left coronary arteries. The angiography was performed via hand injection with .

## 2022-01-22 NOTE — Clinical Note
The site was marked. The right groin and right radial was prepped. The site was prepped with ChloraPrep. The patient was draped.

## 2022-01-22 NOTE — Clinical Note
The DP pulses were 2+ bilaterally. The PT pulses were 2+ bilaterally. The right radial pulse was +2.

## 2022-01-22 NOTE — Clinical Note
120 ml of contrast were injected throughout the case. 80 mL of contrast was the total wasted during the case. 200 mL was the total amount used during the case.

## 2022-01-23 PROBLEM — D64.9 NORMOCYTIC ANEMIA: Status: ACTIVE | Noted: 2022-01-23

## 2022-01-23 PROBLEM — I50.9 CONGESTIVE HEART FAILURE: Status: RESOLVED | Noted: 2019-03-07 | Resolved: 2022-01-23

## 2022-01-23 PROBLEM — N39.0 UTI (URINARY TRACT INFECTION): Status: ACTIVE | Noted: 2022-01-23

## 2022-01-23 PROBLEM — N39.0 UTI (URINARY TRACT INFECTION): Status: RESOLVED | Noted: 2022-01-23 | Resolved: 2022-01-23

## 2022-01-23 LAB
ANISOCYTOSIS BLD QL SMEAR: SLIGHT
APTT BLDCRRT: 32.2 SEC (ref 21–32)
APTT BLDCRRT: 35.6 SEC (ref 21–32)
APTT BLDCRRT: 37.8 SEC (ref 21–32)
APTT BLDCRRT: 39.3 SEC (ref 21–32)
APTT BLDCRRT: 40.5 SEC (ref 21–32)
BACTERIA #/AREA URNS HPF: NORMAL /HPF
BASOPHILS # BLD AUTO: 0.03 K/UL (ref 0–0.2)
BASOPHILS # BLD AUTO: 0.03 K/UL (ref 0–0.2)
BASOPHILS # BLD AUTO: ABNORMAL K/UL (ref 0–0.2)
BASOPHILS NFR BLD: 0.8 % (ref 0–1.9)
BASOPHILS NFR BLD: 0.9 % (ref 0–1.9)
BASOPHILS NFR BLD: 4 % (ref 0–1.9)
BILIRUB UR QL STRIP: NEGATIVE
BNP SERPL-MCNC: 1406 PG/ML (ref 0–99)
CLARITY UR: CLEAR
COLOR UR: YELLOW
DIFFERENTIAL METHOD: ABNORMAL
EOSINOPHIL # BLD AUTO: 0 K/UL (ref 0–0.5)
EOSINOPHIL # BLD AUTO: 0 K/UL (ref 0–0.5)
EOSINOPHIL # BLD AUTO: ABNORMAL K/UL (ref 0–0.5)
EOSINOPHIL NFR BLD: 0 % (ref 0–8)
EOSINOPHIL NFR BLD: 0 % (ref 0–8)
EOSINOPHIL NFR BLD: 0.3 % (ref 0–8)
ERYTHROCYTE [DISTWIDTH] IN BLOOD BY AUTOMATED COUNT: 19.8 % (ref 11.5–14.5)
ERYTHROCYTE [DISTWIDTH] IN BLOOD BY AUTOMATED COUNT: 19.9 % (ref 11.5–14.5)
ERYTHROCYTE [DISTWIDTH] IN BLOOD BY AUTOMATED COUNT: 20.7 % (ref 11.5–14.5)
GLUCOSE UR QL STRIP: NEGATIVE
HCT VFR BLD AUTO: 32.2 % (ref 37–48.5)
HCT VFR BLD AUTO: 33.2 % (ref 37–48.5)
HCT VFR BLD AUTO: 44.6 % (ref 37–48.5)
HGB BLD-MCNC: 10.5 G/DL (ref 12–16)
HGB BLD-MCNC: 10.7 G/DL (ref 12–16)
HGB BLD-MCNC: 14.7 G/DL (ref 12–16)
HGB UR QL STRIP: ABNORMAL
HYPOCHROMIA BLD QL SMEAR: ABNORMAL
HYPOCHROMIA BLD QL SMEAR: ABNORMAL
IMM GRANULOCYTES # BLD AUTO: 0 K/UL (ref 0–0.04)
IMM GRANULOCYTES # BLD AUTO: 0 K/UL (ref 0–0.04)
IMM GRANULOCYTES # BLD AUTO: ABNORMAL K/UL (ref 0–0.04)
IMM GRANULOCYTES NFR BLD AUTO: 0 % (ref 0–0.5)
IMM GRANULOCYTES NFR BLD AUTO: 0 % (ref 0–0.5)
IMM GRANULOCYTES NFR BLD AUTO: ABNORMAL % (ref 0–0.5)
KETONES UR QL STRIP: NEGATIVE
LEUKOCYTE ESTERASE UR QL STRIP: NEGATIVE
LYMPHOCYTES # BLD AUTO: 2.2 K/UL (ref 1–4.8)
LYMPHOCYTES # BLD AUTO: 2.8 K/UL (ref 1–4.8)
LYMPHOCYTES # BLD AUTO: ABNORMAL K/UL (ref 1–4.8)
LYMPHOCYTES NFR BLD: 68 % (ref 18–48)
LYMPHOCYTES NFR BLD: 68.3 % (ref 18–48)
LYMPHOCYTES NFR BLD: 72.5 % (ref 18–48)
MCH RBC QN AUTO: 27 PG (ref 27–31)
MCH RBC QN AUTO: 27 PG (ref 27–31)
MCH RBC QN AUTO: 27.4 PG (ref 27–31)
MCHC RBC AUTO-ENTMCNC: 32.2 G/DL (ref 32–36)
MCHC RBC AUTO-ENTMCNC: 32.6 G/DL (ref 32–36)
MCHC RBC AUTO-ENTMCNC: 33 G/DL (ref 32–36)
MCV RBC AUTO: 83 FL (ref 82–98)
MCV RBC AUTO: 83 FL (ref 82–98)
MCV RBC AUTO: 84 FL (ref 82–98)
MICROSCOPIC COMMENT: NORMAL
MONOCYTES # BLD AUTO: 1 K/UL (ref 0.3–1)
MONOCYTES # BLD AUTO: 1 K/UL (ref 0.3–1)
MONOCYTES # BLD AUTO: ABNORMAL K/UL (ref 0.3–1)
MONOCYTES NFR BLD: 26.4 % (ref 4–15)
MONOCYTES NFR BLD: 28 % (ref 4–15)
MONOCYTES NFR BLD: 30.8 % (ref 4–15)
NEUTROPHILS # BLD AUTO: 0 K/UL (ref 1.8–7.7)
NEUTROPHILS # BLD AUTO: 0 K/UL (ref 1.8–7.7)
NEUTROPHILS # BLD AUTO: ABNORMAL K/UL (ref 1.8–7.7)
NEUTROPHILS NFR BLD: 0 % (ref 38–73)
NITRITE UR QL STRIP: POSITIVE
NRBC BLD-RTO: 0 /100 WBC
PH UR STRIP: 7 [PH] (ref 5–8)
PLATELET # BLD AUTO: 184 K/UL (ref 150–450)
PLATELET # BLD AUTO: 250 K/UL (ref 150–450)
PLATELET # BLD AUTO: 262 K/UL (ref 150–450)
PLATELET BLD QL SMEAR: ABNORMAL
PMV BLD AUTO: 9.6 FL (ref 9.2–12.9)
PMV BLD AUTO: 9.7 FL (ref 9.2–12.9)
PMV BLD AUTO: 9.8 FL (ref 9.2–12.9)
PROCALCITONIN SERPL IA-MCNC: 0.56 NG/ML
PROT UR QL STRIP: NEGATIVE
RBC # BLD AUTO: 3.89 M/UL (ref 4–5.4)
RBC # BLD AUTO: 3.97 M/UL (ref 4–5.4)
RBC # BLD AUTO: 5.37 M/UL (ref 4–5.4)
RBC #/AREA URNS HPF: 3 /HPF (ref 0–4)
SP GR UR STRIP: 1.01 (ref 1–1.03)
TROPONIN I SERPL DL<=0.01 NG/ML-MCNC: 3.14 NG/ML (ref 0–0.03)
URN SPEC COLLECT METH UR: ABNORMAL
UROBILINOGEN UR STRIP-ACNC: NEGATIVE EU/DL
WBC # BLD AUTO: 1.98 K/UL (ref 3.9–12.7)
WBC # BLD AUTO: 3.18 K/UL (ref 3.9–12.7)
WBC # BLD AUTO: 3.83 K/UL (ref 3.9–12.7)

## 2022-01-23 PROCEDURE — 36415 COLL VENOUS BLD VENIPUNCTURE: CPT | Performed by: PHYSICIAN ASSISTANT

## 2022-01-23 PROCEDURE — 85027 COMPLETE CBC AUTOMATED: CPT | Performed by: FAMILY MEDICINE

## 2022-01-23 PROCEDURE — 20000000 HC ICU ROOM

## 2022-01-23 PROCEDURE — 99233 PR SUBSEQUENT HOSPITAL CARE,LEVL III: ICD-10-PCS | Mod: ,,, | Performed by: INTERNAL MEDICINE

## 2022-01-23 PROCEDURE — 25000003 PHARM REV CODE 250: Performed by: PHYSICIAN ASSISTANT

## 2022-01-23 PROCEDURE — 99223 PR INITIAL HOSPITAL CARE,LEVL III: ICD-10-PCS | Mod: ,,, | Performed by: PHYSICIAN ASSISTANT

## 2022-01-23 PROCEDURE — 25000003 PHARM REV CODE 250: Performed by: FAMILY MEDICINE

## 2022-01-23 PROCEDURE — 85007 BL SMEAR W/DIFF WBC COUNT: CPT | Performed by: FAMILY MEDICINE

## 2022-01-23 PROCEDURE — 81000 URINALYSIS NONAUTO W/SCOPE: CPT | Performed by: PHYSICIAN ASSISTANT

## 2022-01-23 PROCEDURE — 36415 COLL VENOUS BLD VENIPUNCTURE: CPT | Performed by: INTERNAL MEDICINE

## 2022-01-23 PROCEDURE — 84145 PROCALCITONIN (PCT): CPT | Performed by: PHYSICIAN ASSISTANT

## 2022-01-23 PROCEDURE — 85730 THROMBOPLASTIN TIME PARTIAL: CPT | Performed by: FAMILY MEDICINE

## 2022-01-23 PROCEDURE — 85025 COMPLETE CBC W/AUTO DIFF WBC: CPT | Performed by: INTERNAL MEDICINE

## 2022-01-23 PROCEDURE — 84484 ASSAY OF TROPONIN QUANT: CPT | Performed by: PHYSICIAN ASSISTANT

## 2022-01-23 PROCEDURE — 83880 ASSAY OF NATRIURETIC PEPTIDE: CPT | Performed by: PHYSICIAN ASSISTANT

## 2022-01-23 PROCEDURE — 94761 N-INVAS EAR/PLS OXIMETRY MLT: CPT

## 2022-01-23 PROCEDURE — 63600175 PHARM REV CODE 636 W HCPCS: Performed by: FAMILY MEDICINE

## 2022-01-23 PROCEDURE — 85730 THROMBOPLASTIN TIME PARTIAL: CPT | Mod: 91 | Performed by: INTERNAL MEDICINE

## 2022-01-23 PROCEDURE — 99223 1ST HOSP IP/OBS HIGH 75: CPT | Mod: ,,, | Performed by: PHYSICIAN ASSISTANT

## 2022-01-23 PROCEDURE — 99233 SBSQ HOSP IP/OBS HIGH 50: CPT | Mod: ,,, | Performed by: INTERNAL MEDICINE

## 2022-01-23 PROCEDURE — 25000003 PHARM REV CODE 250: Performed by: INTERNAL MEDICINE

## 2022-01-23 RX ORDER — LISINOPRIL 2.5 MG/1
2.5 TABLET ORAL DAILY
Status: DISCONTINUED | OUTPATIENT
Start: 2022-01-23 | End: 2022-01-24

## 2022-01-23 RX ORDER — ALPRAZOLAM 0.5 MG/1
0.5 TABLET ORAL NIGHTLY PRN
Status: DISCONTINUED | OUTPATIENT
Start: 2022-01-24 | End: 2022-01-26 | Stop reason: HOSPADM

## 2022-01-23 RX ORDER — FLUTICASONE PROPIONATE 50 MCG
1 SPRAY, SUSPENSION (ML) NASAL DAILY
Status: DISCONTINUED | OUTPATIENT
Start: 2022-01-24 | End: 2022-01-26 | Stop reason: HOSPADM

## 2022-01-23 RX ADMIN — PANTOPRAZOLE SODIUM 40 MG: 40 TABLET, DELAYED RELEASE ORAL at 08:01

## 2022-01-23 RX ADMIN — ACETAMINOPHEN 650 MG: 325 TABLET, FILM COATED ORAL at 09:01

## 2022-01-23 RX ADMIN — ASPIRIN 81 MG: 81 TABLET, DELAYED RELEASE ORAL at 08:01

## 2022-01-23 RX ADMIN — MUPIROCIN: 20 OINTMENT TOPICAL at 08:01

## 2022-01-23 RX ADMIN — CARVEDILOL 12.5 MG: 12.5 TABLET, FILM COATED ORAL at 08:01

## 2022-01-23 RX ADMIN — HEPARIN SODIUM 18 UNITS/KG/HR: 5000 INJECTION INTRAVENOUS; SUBCUTANEOUS at 07:01

## 2022-01-23 RX ADMIN — NITROGLYCERIN 1 PATCH: 0.4 PATCH TRANSDERMAL at 08:01

## 2022-01-23 RX ADMIN — FUROSEMIDE 40 MG: 40 INJECTION, SOLUTION INTRAMUSCULAR; INTRAVENOUS at 08:01

## 2022-01-23 RX ADMIN — ACETAMINOPHEN 650 MG: 325 TABLET, FILM COATED ORAL at 12:01

## 2022-01-23 RX ADMIN — EZETIMIBE 10 MG: 10 TABLET ORAL at 08:01

## 2022-01-23 RX ADMIN — HEPARIN SODIUM 18 UNITS/KG/HR: 5000 INJECTION INTRAVENOUS; SUBCUTANEOUS at 10:01

## 2022-01-23 NOTE — HPI
"Per Martha Hameed PA-C:    "Ms. Odette Hart is a 66 y.o. female, with PMH of CAD, prior MI (2012) s/p LAD stenting, ischemic cardiomyopathy s/p AICD placement in 2014, HFrEF (EF 20%) , HTN, HLD, RA, [place] [facility] [place] [facility] due to chest pain. She states she has awoken in the night, with chest pain (often relieved by drinking water), which she attributed to heart burn. At 0222 on 1/21/22 she awoke with this same burning sensation and was unable to get relief, this time she notes it feels dull and heavy and locates it to the sternal region and denies radiation. There is not dyspnea or shortness of breath associated with her chest pain. She then began to experience nausea and vomiting, and shortness of breath that afternoon. She called EMS, and was taken to the ED in [place]. She states that while there, her pain resolved. An EKG showed no acute ST changes. [place] [facility] [place] to lack of bed availability at another [facility] facility, or [facility] [place] [place] where there are cardiac catheterization capabilities on weekends. She was started on a high intensity heparin drip, ASA, and her home statin were contined. She is admitted to inpatient status."  "

## 2022-01-23 NOTE — ASSESSMENT & PLAN NOTE
Patient presented to [place] with chest pain that started while sleeping.  Trops elevated to a peak of 8.992.  EKG with changes in inferior leads which are new.  Patient was started on IV Heparin and NTG patch and transferred to [facility] for Cardiology intervention.  Patient will have LHC on Monday.    Plan:  Continue ASA  Continue IV Heparin  Continue Carvedilol  Continue with Telemetry  Follow up with Cardiology recommendations

## 2022-01-23 NOTE — H&P
[facility] Intensive [facility] Medicine  History & Physical    Patient Name: Odette Hart  MRN: [id number]  Patient Class: IP- Inpatient  Admission Date: 1/22/2022  Attending Physician: JAIRO Coker MD   Primary Care Provider: Braxton Barragan MD         Patient information was obtained from patient and ER records.     Subjective:     Principal Problem:Ischemic cardiomyopathy    Chief Complaint: No chief complaint on file.       HPI: Ms. Odette Hart is a 66 y.o. female, with PMH of CAD, prior MI (2012) s/p LAD stenting, ischemic cardiomyopathy s/p AICD placement in 2014, HFrEF (EF 20%) , HTN, HLD, RA, who presented to [facility] due to [facility]. She states she has awoken in the night, with chest pain (often relieved by drinking water), which she attributed to heart burn. At 0222 on 1/21/22 she awoke with this same burning sensation and was unable to get relief, this time she notes it feels dull and heavy and locates it to the sternal region and denies radiation. There is not dyspnea or shortness of breath associated with her chest pain. She then began to experience nausea and vomiting, and shortness of breath that afternoon. She called EMS, and was taken to the ED in [place]. She states that while there, her pain resolved. An EKG showed no acute ST changes. Transfer was arranged to [facility] due to lack of bed availability at another [facility] facility, or [facility] [place] [place] where there are cardiac catheterization capabilities on weekends. She was started on a high intensity heparin drip, ASA, and her home statin were contined. She is admitted to inpatient status.       Past Medical History:   Diagnosis Date    Acute coronary syndrome     Allergy     Arthritis     Cardiomyopathy     CHF (congestive heart failure)     Coronary artery disease     Diverticulitis     Diverticulosis     Heart attack     Heart disease     Hyperlipidemia     Hypertension     ICD (implantable  cardioverter-defibrillator) in place        Past Surgical History:   Procedure Laterality Date    ATRIAL CARDIAC PACEMAKER INSERTION       SECTION      CORONARY STENT PLACEMENT         Review of patient's allergies indicates:   Allergen Reactions    Augmentin [amoxicillin-pot clavulanate] Swelling     Not allergic to amoxicillin just a derivative of augmentin    Lisinopril Other (See Comments)     Fluid around heart    Losartan Other (See Comments)     High potassium    Shellfish containing products Anaphylaxis     Pt.states she is allergic to SEAFOOD since the age of 13    Statins-hmg-coa reductase inhibitors Other (See Comments)     Chest pains    Levaquin [levofloxacin] Other (See Comments)     Very bad joint pain    Clindamycin Palpitations     Chest pain       Current Facility-Administered Medications on File Prior to Encounter   Medication    [COMPLETED] acetaminophen tablet 650 mg    [DISCONTINUED] aspirin EC tablet 81 mg    [DISCONTINUED] carvediloL tablet 12.5 mg    [DISCONTINUED] ezetimibe tablet 10 mg    [DISCONTINUED] furosemide injection 40 mg    [DISCONTINUED] heparin 25,000 units in dextrose 5% (100 units/ml) IV bolus from bag - ADDITIONAL PRN BOLUS - 30 units/kg (max bolus 4000 units)    [DISCONTINUED] heparin 25,000 units in dextrose 5% (100 units/ml) IV bolus from bag - ADDITIONAL PRN BOLUS - 60 units/kg (max bolus 4000 units)    [DISCONTINUED] heparin 25,000 units in dextrose 5% 250 mL (100 units/mL) infusion LOW INTENSITY nomogram - [facility]    [DISCONTINUED] mupirocin 2 % ointment    [DISCONTINUED] nitroGLYCERIN 0.4 MG/HR TD PT24 1 patch    [DISCONTINUED] nitroGLYCERIN SL tablet 0.4 mg    [DISCONTINUED] pantoprazole EC tablet 40 mg    [DISCONTINUED] pravastatin tablet 40 mg    [DISCONTINUED] sodium chloride 0.9% flush 10 mL     Current Outpatient Medications on File Prior to Encounter   Medication Sig    albuterol (PROVENTIL/VENTOLIN HFA) 90 mcg/actuation inhaler  Inhale 2 puffs into the lungs every 6 (six) hours as needed for Wheezing. Rescue    ALPRAZolam (XANAX) 0.5 MG tablet Take 1 tablet (0.5 mg total) by mouth nightly as needed for Anxiety.    aspirin (ECOTRIN) 81 MG EC tablet Take 81 mg by mouth once daily.    carvediloL (COREG) 12.5 MG tablet Take 1 tablet (12.5 mg total) by mouth 2 (two) times daily.    citalopram (CELEXA) 20 MG tablet Take 1 tablet (20 mg total) by mouth once daily.    ezetimibe (ZETIA) 10 mg tablet Take 1 tablet (10 mg total) by mouth once daily.    fluticasone propionate (FLONASE) 50 mcg/actuation nasal spray 1 spray (50 mcg total) by Each Nostril route once daily.    Lactobacillus acidoph-L.bulgar 1 million cell Chew Take 2 tablets by mouth 3 (three) times daily with meals. (Patient not taking: Reported on 2021)    omeprazole (PRILOSEC) 40 MG capsule Take 1 capsule (40 mg total) by mouth once daily.    promethazine (PHENERGAN) 25 MG tablet TAKE 1 TABLET BY MOUTH EVERY 6 HOURS AS NEEDED FOR NAUSEA (Patient not taking: Reported on 2021)     Family History     Problem Relation (Age of Onset)    Emphysema Father    Heart attack Brother    Heart disease Mother        Tobacco Use    Smoking status: Former Smoker     Packs/day: 0.50     Start date: 1976     Quit date: 2012     Years since quittin.1    Smokeless tobacco: Never Used   Substance and Sexual Activity    Alcohol use: No    Drug use: No    Sexual activity: Not on file     Review of Systems   Constitutional: Negative for chills, diaphoresis and fever.   Respiratory: Negative for cough, shortness of breath and wheezing.    Cardiovascular: Positive for chest pain. Negative for palpitations.   Gastrointestinal: Positive for nausea and vomiting. Negative for abdominal distention, abdominal pain, constipation and diarrhea.   Genitourinary: Negative for dysuria, flank pain, frequency, hematuria and urgency.   Musculoskeletal: Positive for myalgias. Negative for  arthralgias, neck pain and neck stiffness.   Skin: Negative for color change and rash.   Neurological: Negative for dizziness, syncope, weakness, light-headedness and headaches.   Psychiatric/Behavioral: Negative for agitation and confusion.     Objective:     Vital Signs (Most Recent):  Temp: 98.4 °F (36.9 °C) (01/23/22 0000)  Pulse: 66 (01/23/22 0300)  Resp: (!) 24 (01/23/22 0300)  BP: (!) 100/56 (01/23/22 0300)  SpO2: (!) 94 % (01/23/22 0300) Vital Signs (24h Range):  Temp:  [97.5 °F (36.4 °C)-100.4 °F (38 °C)] 98.4 °F (36.9 °C)  Pulse:  [66-92] 66  Resp:  [15-35] 24  SpO2:  [92 %-99 %] 94 %  BP: ()/(50-71) 100/56     Weight: 60 kg (132 lb 4.4 oz)  Body mass index is 23.43 kg/m².    Physical Exam  Vitals and nursing note reviewed.   Constitutional:       General: She is not in acute distress.     Appearance: Normal appearance. She is well-developed, normal weight and well-nourished. She is not ill-appearing or diaphoretic.   HENT:      Head: Normocephalic and atraumatic.   Eyes:      General: No scleral icterus.        Right eye: No discharge.         Left eye: No discharge.      Extraocular Movements: EOM normal.      Conjunctiva/sclera: Conjunctivae normal.   Neck:      Trachea: No tracheal deviation.   Cardiovascular:      Rate and Rhythm: Normal rate and regular rhythm.      Pulses: Intact distal pulses.      Heart sounds: Normal heart sounds. No murmur heard.  No friction rub. No gallop.    Pulmonary:      Effort: Pulmonary effort is normal. No respiratory distress.      Breath sounds: Normal breath sounds. No stridor. No wheezing or rales.   Abdominal:      General: Bowel sounds are normal. There is no distension.      Palpations: Abdomen is soft. There is no mass.      Tenderness: There is no abdominal tenderness. There is no guarding.   Musculoskeletal:      Cervical back: Normal range of motion and neck supple.   Skin:     General: Skin is warm and dry.      Coloration: Skin is not pale.       Findings: No erythema or rash.   Neurological:      General: No focal deficit present.      Mental Status: She is alert and oriented to person, place, and time.      Cranial Nerves: No cranial nerve deficit.      Motor: No abnormal muscle tone.   Psychiatric:         Mood and Affect: Mood and affect and mood normal.         Behavior: Behavior normal.         Thought Content: Thought content normal.         Judgment: Judgment normal.           CRANIAL NERVES     CN III, IV, VI   Extraocular motions are normal.        Significant Labs:   All pertinent labs within the past 24 hours have been reviewed.  BMP:   Recent Labs   Lab 01/22/22  1923   *   *   K 4.6   CL 97   CO2 27   BUN 20   CREATININE 0.7   CALCIUM 8.6*   MG 1.9     CBC:   Recent Labs   Lab 01/21/22  1503 01/22/22  0319 01/22/22  2353   WBC 2.26* 3.82* 3.83*   HGB 12.5 11.6* 10.5*   HCT 39.4 36.7* 32.2*    298 262     CMP:   Recent Labs   Lab 01/21/22  1503 01/22/22  1105 01/22/22  1923    134* 133*   K 4.7 4.6 4.6    99 97   CO2 22* 22* 27   * 103 112*   BUN 17 17 20   CREATININE 0.8 0.7 0.7   CALCIUM 9.0 9.1 8.6*   PROT 7.6 7.3  --    ALBUMIN 2.8* 2.7*  --    BILITOT 0.6 0.9  --    ALKPHOS 95 94  --    AST 49* 56*  --    ALT 33 30  --    ANIONGAP 13 13 9   EGFRNONAA >60.0 >60.0 >60     Urine Culture: No results for input(s): LABURIN in the last 48 hours.  Urine Studies:   Recent Labs   Lab 01/23/22  0141   COLORU Yellow   APPEARANCEUA Clear   PHUR 7.0   SPECGRAV 1.010   PROTEINUA Negative   GLUCUA Negative   KETONESU Negative   BILIRUBINUA Negative   OCCULTUA Trace*   NITRITE Positive*   UROBILINOGEN Negative   LEUKOCYTESUR Negative   RBCUA 3   BACTERIA Occasional       Significant Imaging: I have reviewed all pertinent imaging results/findings within the past 24 hours.               Assessment/Plan:     * Ischemic cardiomyopathy  - s/p ACID placement   - monitor on tele       NSTEMI (non-ST elevated myocardial  infarction)  - Ms. Odette Hart presents with chest pain that started while sleeping   - troponin is elevated   - Cardiology consulted    - seen by Cardiology with recs for Cardiac catheterization on Monday  - continue heparin drip, ASA, statin, nitro PRN, oxygen PRN, morphine PRN  - troponin downward trending while in ED       UTI (urinary tract infection)  - started on cipro/vancomycin, continue   - Urine culture pending       HLD (hyperlipidemia)  - continue formulary equivalent of pravastatin       Congestive heart failure  - BNP is 1692 upon admission  - no physical exam swelling of lower extremities   - monitor I&O and daily weights      VTE Risk Mitigation (From admission, onward)         Ordered     heparin 25,000 units in dextrose 5% 250 mL (100 units/mL) infusion LOW INTENSITY nomogram - [facility]  Continuous        Question Answer Comment   Heparin Infusion Adjustment (DO NOT MODIFY ANSWER) \\[email or web address]\epic\Images\Pharmacy\HeparinInfusions\heparin LOW INTENSITY nomogram for [facility] [id number].pdf    Begin at (in units/kg/hr) 12        01/22/22 1907     IP VTE HIGH RISK PATIENT  Once         01/22/22 1907     Reason for no Mechanical VTE Prophylaxis  Once        Question:  Reasons:  Answer:  IV Heparin within 24 hrs. Pre or Post-Op    01/22/22 1907     heparin 25,000 units in dextrose 5% (100 units/ml) IV bolus from bag - ADDITIONAL PRN BOLUS - 60 units/kg (max bolus 4000 units)  As needed (PRN)        Question:  Heparin Infusion Adjustment (DO NOT MODIFY ANSWER)  Answer:  \[email or web address]\epic\Images\Pharmacy\HeparinInfusions\heparin LOW INTENSITY nomogram for [facility] [id number].pdf    01/22/22 1907     heparin 25,000 units in dextrose 5% (100 units/ml) IV bolus from bag - ADDITIONAL PRN BOLUS - 30 units/kg (max bolus 4000 units)  As needed (PRN)        Question:  Heparin Infusion Adjustment (DO NOT MODIFY ANSWER)  Answer:  \[email or web address]\epic\Images\Pharmacy\HeparinInfusions\heparin LOW INTENSITY nomogram for [facility]  [id number].pdf    01/22/22 1907                   Martha Hameed PA-C  Department of Hospital Medicine   [facility] - Intensive Care ([place])

## 2022-01-23 NOTE — ASSESSMENT & PLAN NOTE
- Ms. Odette Hart presents with chest pain that started while sleeping   - troponin is elevated   - Cardiology consulted    - seen by Cardiology with recs for Cardiac catheterization on Monday  - continue heparin drip, ASA, statin, nitro PRN, oxygen PRN, morphine PRN  - troponin downward trending while in ED

## 2022-01-23 NOTE — EICU
Eicu brief admission review note.  Pt was examined on video, notes, images, labs  reviewed.  66F with CAD s/p LAD stent, ICM/HFrEF 20-25% s/p AICD, HTN, HLD who presented to [facility] 1/21 for chest pain. She was diagnosed with NSTEMI, also found to have some volume overload based on CXR nad  BNP 1692.  COVID 19 neg  She was started on Heparin qtt,  Received  IV Lasix 40 mg and transferred for higher level of care.  NSTEMI- ASA,  Coreg,  statin,  Nitro, plans for cath lab on Monday  HFrEF-  Mild pulmonary emeda on CXR,lasix prn  Going in /out of a.fluteer prior to transfer- on coreg, in sinus now  Anemia- mild H&H drop, no active bleeding, monitor   DVT proph- Heparin iv  D/w RN

## 2022-01-23 NOTE — PROGRESS NOTES
[facility] - Intensive Care [facility] Medicine  Progress Note    Patient Name: Odette Hart  MRN: [id number]  Patient Class: IP- Inpatient   Admission Date: 1/22/2022  Length of Stay: 1 days  Attending Physician: Marco Antonio Alvarado MD  Primary Care Provider: Braxton Barragan MD        Subjective:     Principal Problem:Ischemic cardiomyopathy        HPI:  Ms. Odette Hart is a 66 y.o. female, with PMH of CAD, prior MI (2012) s/p LAD stenting, ischemic cardiomyopathy s/p AICD placement in 2014, HFrEF (EF 20%) , HTN, HLD, RA, who presented to [facility] in transfer from [facility] ICU due to chest pain. She states she has awoken in the night, with chest pain (often relieved by drinking water), which she attributed to heart burn. At 0222 on 1/21/22 she awoke with this same burning sensation and was unable to get relief, this time she notes it feels dull and heavy and locates it to the sternal region and denies radiation. There is not dyspnea or shortness of breath associated with her chest pain. She then began to experience nausea and vomiting, and shortness of breath that afternoon. She called EMS, and was taken to the ED in [place]. She states that while there, her pain resolved. An EKG showed no acute ST changes. Transfer was arranged to [facility] due to lack of bed availability at another [facility] facility, or [facility] where there are cardiac catheterization capabilities on weekends. She was started on a high intensity heparin drip, ASA, and her home statin were contined. She is admitted to inpatient status.       Overview/Hospital Course:  Patient was admitted to critical care for NSTEMI. On heparin drip. Going in and out of atrial flutter. Cardiology recommended patient be transferred to facility with function cath lab.  Patient will be transferred to [facility]. She is clinically stable for transfer.       Interval History: Patient is awake and alert this morning.  No further chest pain.  No SOB at rest.  She  has a frontal headache and nausea from the NTG.  No abdominal apin.    Review of Systems   Constitutional: Negative for chills, diaphoresis and fever.   HENT: Negative for ear pain.    Eyes: Negative for pain.   Respiratory: Negative for cough, shortness of breath and wheezing.    Cardiovascular: Negative for chest pain and palpitations.   Gastrointestinal: Positive for nausea. Negative for abdominal distention, abdominal pain, constipation, diarrhea and vomiting.   Genitourinary: Negative for dysuria, flank pain, frequency, hematuria and urgency.   Musculoskeletal: Negative for arthralgias, myalgias, neck pain and neck stiffness.   Skin: Negative for color change and rash.   Neurological: Negative for dizziness, syncope, weakness, light-headedness and headaches.   Psychiatric/Behavioral: Negative for agitation and confusion.     Objective:     Vital Signs (Most Recent):  Temp: 98 °F (36.7 °C) (01/23/22 0705)  Pulse: 79 (01/23/22 0900)  Resp: (!) 25 (01/23/22 0900)  BP: 119/65 (01/23/22 0900)  SpO2: 96 % (01/23/22 0900) Vital Signs (24h Range):  Temp:  [98 °F (36.7 °C)-100.4 °F (38 °C)] 98 °F (36.7 °C)  Pulse:  [64-86] 79  Resp:  [15-35] 25  SpO2:  [90 %-99 %] 96 %  BP: ()/(50-78) 119/65     Weight: 60 kg (132 lb 4.4 oz)  Body mass index is 23.43 kg/m².    Intake/Output Summary (Last 24 hours) at 1/23/2022 1350  Last data filed at 1/23/2022 0633  Gross per 24 hour   Intake 105.7 ml   Output 1000 ml   Net -894.3 ml      Physical Exam  Vitals and nursing note reviewed.   Constitutional:       General: She is not in acute distress.     Appearance: Normal appearance. She is well-developed and normal weight. She is not ill-appearing or diaphoretic.   HENT:      Head: Normocephalic and atraumatic.      Right Ear: External ear normal.      Left Ear: External ear normal.      Nose: Nose normal.      Mouth/Throat:      Mouth: Mucous membranes are moist.      Pharynx: No oropharyngeal exudate.   Eyes:      General: No  scleral icterus.        Right eye: No discharge.         Left eye: No discharge.      Conjunctiva/sclera: Conjunctivae normal.   Neck:      Trachea: No tracheal deviation.   Cardiovascular:      Rate and Rhythm: Normal rate and regular rhythm.      Heart sounds: Normal heart sounds. No murmur heard.  No friction rub. No gallop.    Pulmonary:      Effort: Pulmonary effort is normal. No respiratory distress.      Breath sounds: Normal breath sounds. No stridor. No wheezing or rales.   Abdominal:      General: Bowel sounds are normal. There is no distension.      Palpations: Abdomen is soft. There is no mass.      Tenderness: There is no abdominal tenderness. There is no guarding.   Musculoskeletal:         General: Normal range of motion.      Cervical back: Normal range of motion and neck supple.      Right lower leg: No edema.      Left lower leg: No edema.   Skin:     General: Skin is warm and dry.      Coloration: Skin is not pale.      Findings: No erythema or rash.   Neurological:      General: No focal deficit present.      Mental Status: She is alert and oriented to person, place, and time.      Cranial Nerves: No cranial nerve deficit.      Motor: No abnormal muscle tone.   Psychiatric:         Mood and Affect: Mood normal.         Behavior: Behavior normal.         Significant Labs: All pertinent labs within the past 24 hours have been reviewed.    Significant Imaging: I have reviewed all pertinent imaging results/findings within the past 24 hours.      Assessment/Plan:      * Ischemic cardiomyopathy  Patient is s/p ACID placement.   CXR on admission with mild edema and BNP elevated at 1692.  She was treated with IV Lasix at [place] and a dose given on 1/23/2022 at [facility].  TTE in 2/2019 with Moderate left ventricular enlargement.  Normal right ventricular systolic function.  Severely decreased left ventricular systolic function. The estimated ejection fraction is 20%  Grade I (mild) left ventricular  diastolic dysfunction consistent with impaired relaxation.  Local segmental wall motion abnormalities.  Home Meds: Carvedilol 12.5mg bid  Started on Lisinopril 2.5mg on admission  -Follow up with repeat TTE  -Monitor I&Os      - monitor on tele       NSTEMI (non-ST elevated myocardial infarction)  Patient presented to [place] with chest pain that started while sleeping.  Trops elevated to a peak of 8.992.  EKG with changes in inferior leads which are new.  Patient was started on IV Heparin and NTG patch and transferred to [facility] for Cardiology intervention.  Patient will have LHC on Monday.    Plan:  Continue ASA  Continue IV Heparin  Continue Carvedilol  Continue with Telemetry  Follow up with Cardiology recommendations        HLD (hyperlipidemia)  -Continue Statin        VTE Risk Mitigation (From admission, onward)         Ordered     heparin 25,000 units in dextrose 5% 250 mL (100 units/mL) infusion LOW INTENSITY nomogram - [facility]  Continuous        Question Answer Comment   Heparin Infusion Adjustment (DO NOT MODIFY ANSWER) \\[email or web address]\epic\Images\Pharmacy\HeparinInfusions\heparin LOW INTENSITY nomogram for [facility] [id number].pdf    Begin at (in units/kg/hr) 12        01/22/22 1907     IP VTE HIGH RISK PATIENT  Once         01/22/22 1907     Reason for no Mechanical VTE Prophylaxis  Once        Question:  Reasons:  Answer:  IV Heparin within 24 hrs. Pre or Post-Op    01/22/22 1907     heparin 25,000 units in dextrose 5% (100 units/ml) IV bolus from bag - ADDITIONAL PRN BOLUS - 60 units/kg (max bolus 4000 units)  As needed (PRN)        Question:  Heparin Infusion Adjustment (DO NOT MODIFY ANSWER)  Answer:  \[employer]\epic\Images\Pharmacy\HeparinInfusions\heparin LOW INTENSITY nomogram for [facility] [id number].pdf    01/22/22 1907     heparin 25,000 units in dextrose 5% (100 units/ml) IV bolus from bag - ADDITIONAL PRN BOLUS - 30 units/kg (max bolus 4000 units)  As needed (PRN)        Question:  Heparin Infusion Adjustment  (DO NOT MODIFY ANSWER)  Answer:  \\[email or web address]\epic\Images\Pharmacy\HeparinInfusions\heparin LOW INTENSITY nomogram for [facility] [id number].pdf    01/22/22 1907                Discharge Planning   SIVAN:      Code Status: Full Code   Is the patient medically ready for discharge?:     Reason for patient still in hospital (select all that apply): Patient trending condition, Treatment and Consult recommendations                     Marco Antonio Alvarado MD  Department of Hospital Medicine   [facility] - Intensive Care [facility])

## 2022-01-23 NOTE — ASSESSMENT & PLAN NOTE
- BNP is 1692 upon admission  - no physical exam swelling of lower extremities   - monitor I&O and daily weights

## 2022-01-23 NOTE — DISCHARGE SUMMARY
[facility] Medicine  Discharge Summary      Patient Name: Odette Hart  MRN: [id number]  Admission Date: 1/22/2022  Hospital Length of Stay: 1 days  Discharge Date and Time: 1/22/2022  Attending Physician: Marco Antonio Alvarado MD   Discharging Provider: Katlin Aranda MD  Primary Care Provider: Braxton Barragan MD        HPI: Odette Hart is a 66 y.o.  female who  has a past medical history of acute MI in 2012 s/p coronary stent in LAD, ischemic cardiomyopathy, CHF with severely reduced EF for 20-25% s/p AICD, CAD, RA, Allergy, Arthritis,Diverticulitis, Diverticulosis, Hyperlipidemia, Hypertension. The patient presented to [facility] on 1/21/22 with chest pain x1 day. Patient reports her pain started day before yesterday, describes as dull, heaviness in substernal region, non radiating, rating it 5/10 in intensity. Patient denies associated dyspnea or SOB. ED workup was concerning for NSTEMI with significantly elevated troponin and EKG changes. Ed physician spoke with Cardiologist on call Dr. Cowart who recommended patient be transferred to [facility] for cath.             Hospital Course: Patient was admitted ICU on heparin drip. She is currently asymptomatic. Receiving IV lasix for vascular congestion seen on CXR. Patient currently on 2L NC. Hemodynamically stable.  Patient was evaluated by Cardiology recommending transfer to [facility] ICU for cath. Patient is accepted and is stable for transfer.       Consults:   Consults (From admission, onward)        Status Ordering Provider     Inpatient consult to Cardiology  Once        Provider:  Yohannes Cordova MD    Acknowledged AYDEN CALLAWAY          Final Active Diagnoses:    Diagnosis Date Noted POA    PRINCIPAL PROBLEM:  Ischemic cardiomyopathy [I25.5] 02/04/2019 Yes    UTI (urinary tract infection) [N39.0] 01/23/2022 Yes    HLD (hyperlipidemia) [E78.5] 01/22/2022 Yes    Congestive heart  failure [I50.9] 03/07/2019 Yes    NSTEMI (non-ST elevated myocardial infarction) [I21.4] 02/04/2019 Yes      Problems Resolved During this Admission:      Discharged Condition: stable    Disposition: Discharged to Other Facility    Follow Up:    Patient Instructions:   No discharge procedures on file.  Medications:  Transfer Medications (for Discharge Readmit only):   Current Facility-Administered Medications   Medication Dose Route Frequency Provider Last Rate Last Admin    acetaminophen tablet 650 mg  650 mg Oral Q6H PRN Martha Hameed PA-C   650 mg at 01/23/22 0033    aspirin EC tablet 81 mg  81 mg Oral Daily Jamie Parks MD   81 mg at 01/23/22 0804    carvediloL tablet 12.5 mg  12.5 mg Oral BID Jamie Parks MD   12.5 mg at 01/23/22 0804    ezetimibe tablet 10 mg  10 mg Oral Daily Jamie Parks MD   10 mg at 01/23/22 0803    heparin 25,000 units in dextrose 5% (100 units/ml) IV bolus from bag - ADDITIONAL PRN BOLUS - 30 units/kg (max bolus 4000 units)  30 Units/kg (Adjusted) Intravenous PRN Jamie Parks MD   1,690 Units at 01/23/22 1234    heparin 25,000 units in dextrose 5% (100 units/ml) IV bolus from bag - ADDITIONAL PRN BOLUS - 60 units/kg (max bolus 4000 units)  60 Units/kg (Adjusted) Intravenous PRN Jamie Parks MD        heparin 25,000 units in dextrose 5% 250 mL (100 units/mL) infusion LOW INTENSITY nomogram - [facility]  0-40 Units/kg/hr (Adjusted) Intravenous Continuous Jamie Parks MD 9 mL/hr at 01/23/22 1237 16 Units/kg/hr at 01/23/22 1237    lisinopriL tablet 2.5 mg  2.5 mg Oral Daily Yohannes Cordova MD        mupirocin 2 % ointment   Nasal BID Jamie Parks MD   Given at 01/23/22 0807    nitroGLYCERIN 0.4 MG/HR TD PT24 1 patch  1 patch Transdermal Daily Jamie Parks MD   1 patch at 01/23/22 0805    nitroGLYCERIN SL tablet 0.4 mg  0.4 mg Sublingual Q5 Min PRN Jamie Parks MD        pantoprazole EC tablet 40 mg  40 mg Oral Daily Jamie Parks MD   40 mg at 01/23/22 0803     pravastatin tablet 40 mg  40 mg Oral Daily Jamie Parks MD        scopolamine 1.3-1.5 mg (1 mg over 3 days) 1 patch  1 patch Transdermal Q3 Days Martha Hameed PA-C   1 patch at 01/22/22 2215    sodium chloride 0.9% flush 10 mL  10 mL Intravenous PRN Jamie Parks MD           Significant Diagnostic Studies: Labs:   CMP   Recent Labs   Lab 01/21/22  1503 01/22/22  1105 01/22/22 1923    134* 133*   K 4.7 4.6 4.6    99 97   CO2 22* 22* 27   * 103 112*   BUN 17 17 20   CREATININE 0.8 0.7 0.7   CALCIUM 9.0 9.1 8.6*   PROT 7.6 7.3  --    ALBUMIN 2.8* 2.7*  --    BILITOT 0.6 0.9  --    ALKPHOS 95 94  --    AST 49* 56*  --    ALT 33 30  --    ANIONGAP 13 13 9   ESTGFRAFRICA >60.0 >60.0 >60   EGFRNONAA >60.0 >60.0 >60   , CBC   Recent Labs   Lab 01/22/22  2353 01/22/22  2353 01/23/22  0349 01/23/22  0349 01/23/22  0614   WBC 3.83*  --  1.98*  --  3.18*   HGB 10.5*  --  14.7  --  10.7*   HCT 32.2*   < > 44.6   < > 33.2*     --  184  --  250    < > = values in this interval not displayed.   , INR   Lab Results   Component Value Date    INR 1.0 01/22/2022    and Troponin   Recent Labs   Lab 01/22/22  0319 01/22/22  1923 01/23/22  0350   TROPONINI 6.745* 4.949* 3.138*       Pending Diagnostic Studies:     Procedure Component Value Units Date/Time    EKG 12-LEAD [[id number]]     Order Status: Sent Lab Status: No result     Echo [[id number]]     Order Status: Sent Lab Status: No result         Indwelling Lines/Drains at time of discharge:   Lines/Drains/Airways     None                 Time spent on the discharge of patient: 35 minutes         Katlin Aranda MD  Department of Hospital Medicine  [facility] Int[facility] ([place])

## 2022-01-23 NOTE — ASSESSMENT & PLAN NOTE
Patient presented to [place] with chest pain that started while sleeping.  Trops elevated to a peak of 8.992.  EKG with changes in inferior leads which are new.  Patient was started on IV Heparin and NTG patch and transferred to [facility] for Cardiology intervention.  Patient s/p [facility] this morning:  LAD: Proximal stent patent. LCX: Proximal 80%. LCX: Dominant. Moderate disease. LCX: HEVER 2.75 x 18 mm. Distal dissection. HEVER 2.75 x 22 mm.    Plan:  Follow up with Cardiology recommendations - Start Prasugrel and Stop IV Heparin.  Continue ASA.    Continue Carvedilol  Continue with Telemetry

## 2022-01-23 NOTE — HOSPITAL COURSE
Patient presented to [facility] with chest pain that started while sleeping.  Trops elevated to a peak of 8.992.  EKG with changes in inferior leads which are new.  Patient was started on IV Heparin and NTG patch and transferred to [facility] for Cardiology intervention.  Patient s/p [facility] this morning:  LAD: Proximal stent patent. LCX: Proximal 80%. LCX: Dominant. Moderate disease. LCX: HEVER 2.75 x 18 mm. Distal dissection. HEVER 2.75 x 22 mm.  IV Heparin stopped on 1/24/2021.  Prasugrel started by Cardiology.

## 2022-01-23 NOTE — ASSESSMENT & PLAN NOTE
Patient is s/p ACID placement.   CXR on admission with mild edema and BNP elevated at 1692.  She was treated with IV Lasix at [place] and a dose given on 1/23/2022 at [facility].  TTE in 2/2019 with Moderate left ventricular enlargement.  Normal right ventricular systolic function.  Severely decreased left ventricular systolic function. The estimated ejection fraction is 20%  Grade I (mild) left ventricular diastolic dysfunction consistent with impaired relaxation.  Local segmental wall motion abnormalities.  Home Meds: Carvedilol 12.5mg bid  Started on Lisinopril 2.5mg on admission  -Follow up with repeat TTE  -Monitor I&Os      - monitor on tele
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
## 2022-01-23 NOTE — SUBJECTIVE & OBJECTIVE
Interval History: Patient is awake and alert this morning.  No further chest pain.  No SOB at rest.  She has a frontal headache and nausea from the NTG.  No abdominal apin.    Review of Systems   Constitutional: Negative for chills, diaphoresis and fever.   HENT: Negative for ear pain.    Eyes: Negative for pain.   Respiratory: Negative for cough, shortness of breath and wheezing.    Cardiovascular: Negative for chest pain and palpitations.   Gastrointestinal: Positive for nausea. Negative for abdominal distention, abdominal pain, constipation, diarrhea and vomiting.   Genitourinary: Negative for dysuria, flank pain, frequency, hematuria and urgency.   Musculoskeletal: Negative for arthralgias, myalgias, neck pain and neck stiffness.   Skin: Negative for color change and rash.   Neurological: Negative for dizziness, syncope, weakness, light-headedness and headaches.   Psychiatric/Behavioral: Negative for agitation and confusion.     Objective:     Vital Signs (Most Recent):  Temp: 98 °F (36.7 °C) (01/23/22 0705)  Pulse: 79 (01/23/22 0900)  Resp: (!) 25 (01/23/22 0900)  BP: 119/65 (01/23/22 0900)  SpO2: 96 % (01/23/22 0900) Vital Signs (24h Range):  Temp:  [98 °F (36.7 °C)-100.4 °F (38 °C)] 98 °F (36.7 °C)  Pulse:  [64-86] 79  Resp:  [15-35] 25  SpO2:  [90 %-99 %] 96 %  BP: ()/(50-78) 119/65     Weight: 60 kg (132 lb 4.4 oz)  Body mass index is 23.43 kg/m².    Intake/Output Summary (Last 24 hours) at 1/23/2022 1350  Last data filed at 1/23/2022 0633  Gross per 24 hour   Intake 105.7 ml   Output 1000 ml   Net -894.3 ml      Physical Exam  Vitals and nursing note reviewed.   Constitutional:       General: She is not in acute distress.     Appearance: Normal appearance. She is well-developed and normal weight. She is not ill-appearing or diaphoretic.   HENT:      Head: Normocephalic and atraumatic.      Right Ear: External ear normal.      Left Ear: External ear normal.      Nose: Nose normal.      Mouth/Throat:       Mouth: Mucous membranes are moist.      Pharynx: No oropharyngeal exudate.   Eyes:      General: No scleral icterus.        Right eye: No discharge.         Left eye: No discharge.      Conjunctiva/sclera: Conjunctivae normal.   Neck:      Trachea: No tracheal deviation.   Cardiovascular:      Rate and Rhythm: Normal rate and regular rhythm.      Heart sounds: Normal heart sounds. No murmur heard.  No friction rub. No gallop.    Pulmonary:      Effort: Pulmonary effort is normal. No respiratory distress.      Breath sounds: Normal breath sounds. No stridor. No wheezing or rales.   Abdominal:      General: Bowel sounds are normal. There is no distension.      Palpations: Abdomen is soft. There is no mass.      Tenderness: There is no abdominal tenderness. There is no guarding.   Musculoskeletal:         General: Normal range of motion.      Cervical back: Normal range of motion and neck supple.      Right lower leg: No edema.      Left lower leg: No edema.   Skin:     General: Skin is warm and dry.      Coloration: Skin is not pale.      Findings: No erythema or rash.   Neurological:      General: No focal deficit present.      Mental Status: She is alert and oriented to person, place, and time.      Cranial Nerves: No cranial nerve deficit.      Motor: No abnormal muscle tone.   Psychiatric:         Mood and Affect: Mood normal.         Behavior: Behavior normal.         Significant Labs: All pertinent labs within the past 24 hours have been reviewed.    Significant Imaging: I have reviewed all pertinent imaging results/findings within the past 24 hours.

## 2022-01-23 NOTE — SUBJECTIVE & OBJECTIVE
Past Medical History:   Diagnosis Date    Acute coronary syndrome     Allergy     Arthritis     Cardiomyopathy     CHF (congestive heart failure)     Coronary artery disease     Diverticulitis     Diverticulosis     Heart attack     Heart disease     Hyperlipidemia     Hypertension     ICD (implantable cardioverter-defibrillator) in place        Past Surgical History:   Procedure Laterality Date    ATRIAL CARDIAC PACEMAKER INSERTION       SECTION      CORONARY STENT PLACEMENT         Review of patient's allergies indicates:   Allergen Reactions    Augmentin [amoxicillin-pot clavulanate] Swelling     Not allergic to amoxicillin just a derivative of augmentin    Lisinopril Other (See Comments)     Fluid around heart    Losartan Other (See Comments)     High potassium    Shellfish containing products Anaphylaxis     Pt.states she is allergic to SEAFOOD since the age of 13    Statins-hmg-coa reductase inhibitors Other (See Comments)     Chest pains    Levaquin [levofloxacin] Other (See Comments)     Very bad joint pain    Clindamycin Palpitations     Chest pain       Current Facility-Administered Medications on File Prior to Encounter   Medication    [COMPLETED] acetaminophen tablet 650 mg    [DISCONTINUED] aspirin EC tablet 81 mg    [DISCONTINUED] carvediloL tablet 12.5 mg    [DISCONTINUED] ezetimibe tablet 10 mg    [DISCONTINUED] furosemide injection 40 mg    [DISCONTINUED] heparin 25,000 units in dextrose 5% (100 units/ml) IV bolus from bag - ADDITIONAL PRN BOLUS - 30 units/kg (max bolus 4000 units)    [DISCONTINUED] heparin 25,000 units in dextrose 5% (100 units/ml) IV bolus from bag - ADDITIONAL PRN BOLUS - 60 units/kg (max bolus 4000 units)    [DISCONTINUED] heparin 25,000 units in dextrose 5% 250 mL (100 units/mL) infusion LOW INTENSITY nomogram - [facility]    [DISCONTINUED] mupirocin 2 % ointment    [DISCONTINUED] nitroGLYCERIN 0.4 MG/HR TD PT24 1 patch    [DISCONTINUED]  nitroGLYCERIN SL tablet 0.4 mg    [DISCONTINUED] pantoprazole EC tablet 40 mg    [DISCONTINUED] pravastatin tablet 40 mg    [DISCONTINUED] sodium chloride 0.9% flush 10 mL     Current Outpatient Medications on File Prior to Encounter   Medication Sig    albuterol (PROVENTIL/VENTOLIN HFA) 90 mcg/actuation inhaler Inhale 2 puffs into the lungs every 6 (six) hours as needed for Wheezing. Rescue    ALPRAZolam (XANAX) 0.5 MG tablet Take 1 tablet (0.5 mg total) by mouth nightly as needed for Anxiety.    aspirin (ECOTRIN) 81 MG EC tablet Take 81 mg by mouth once daily.    carvediloL (COREG) 12.5 MG tablet Take 1 tablet (12.5 mg total) by mouth 2 (two) times daily.    citalopram (CELEXA) 20 MG tablet Take 1 tablet (20 mg total) by mouth once daily.    ezetimibe (ZETIA) 10 mg tablet Take 1 tablet (10 mg total) by mouth once daily.    fluticasone propionate (FLONASE) 50 mcg/actuation nasal spray 1 spray (50 mcg total) by Each Nostril route once daily.    Lactobacillus acidoph-L.bulgar 1 million cell Chew Take 2 tablets by mouth 3 (three) times daily with meals. (Patient not taking: Reported on 2021)    omeprazole (PRILOSEC) 40 MG capsule Take 1 capsule (40 mg total) by mouth once daily.    promethazine (PHENERGAN) 25 MG tablet TAKE 1 TABLET BY MOUTH EVERY 6 HOURS AS NEEDED FOR NAUSEA (Patient not taking: Reported on 2021)     Family History     Problem Relation (Age of Onset)    Emphysema Father    Heart attack Brother    Heart disease Mother        Tobacco Use    Smoking status: Former Smoker     Packs/day: 0.50     Start date: 1976     Quit date: 2012     Years since quittin.1    Smokeless tobacco: Never Used   Substance and Sexual Activity    Alcohol use: No    Drug use: No    Sexual activity: Not on file     Review of Systems   Constitutional: Negative for chills, diaphoresis and fever.   Respiratory: Negative for cough, shortness of breath and wheezing.    Cardiovascular:  Positive for chest pain. Negative for palpitations.   Gastrointestinal: Positive for nausea and vomiting. Negative for abdominal distention, abdominal pain, constipation and diarrhea.   Genitourinary: Negative for dysuria, flank pain, frequency, hematuria and urgency.   Musculoskeletal: Positive for myalgias. Negative for arthralgias, neck pain and neck stiffness.   Skin: Negative for color change and rash.   Neurological: Negative for dizziness, syncope, weakness, light-headedness and headaches.   Psychiatric/Behavioral: Negative for agitation and confusion.     Objective:     Vital Signs (Most Recent):  Temp: 98.4 °F (36.9 °C) (01/23/22 0000)  Pulse: 66 (01/23/22 0300)  Resp: (!) 24 (01/23/22 0300)  BP: (!) 100/56 (01/23/22 0300)  SpO2: (!) 94 % (01/23/22 0300) Vital Signs (24h Range):  Temp:  [97.5 °F (36.4 °C)-100.4 °F (38 °C)] 98.4 °F (36.9 °C)  Pulse:  [66-92] 66  Resp:  [15-35] 24  SpO2:  [92 %-99 %] 94 %  BP: ()/(50-71) 100/56     Weight: 60 kg (132 lb 4.4 oz)  Body mass index is 23.43 kg/m².    Physical Exam  Vitals and nursing note reviewed.   Constitutional:       General: She is not in acute distress.     Appearance: Normal appearance. She is well-developed, normal weight and well-nourished. She is not ill-appearing or diaphoretic.   HENT:      Head: Normocephalic and atraumatic.   Eyes:      General: No scleral icterus.        Right eye: No discharge.         Left eye: No discharge.      Extraocular Movements: EOM normal.      Conjunctiva/sclera: Conjunctivae normal.   Neck:      Trachea: No tracheal deviation.   Cardiovascular:      Rate and Rhythm: Normal rate and regular rhythm.      Pulses: Intact distal pulses.      Heart sounds: Normal heart sounds. No murmur heard.  No friction rub. No gallop.    Pulmonary:      Effort: Pulmonary effort is normal. No respiratory distress.      Breath sounds: Normal breath sounds. No stridor. No wheezing or rales.   Abdominal:      General: Bowel sounds are  normal. There is no distension.      Palpations: Abdomen is soft. There is no mass.      Tenderness: There is no abdominal tenderness. There is no guarding.   Musculoskeletal:      Cervical back: Normal range of motion and neck supple.   Skin:     General: Skin is warm and dry.      Coloration: Skin is not pale.      Findings: No erythema or rash.   Neurological:      General: No focal deficit present.      Mental Status: She is alert and oriented to person, place, and time.      Cranial Nerves: No cranial nerve deficit.      Motor: No abnormal muscle tone.   Psychiatric:         Mood and Affect: Mood and affect and mood normal.         Behavior: Behavior normal.         Thought Content: Thought content normal.         Judgment: Judgment normal.           CRANIAL NERVES     CN III, IV, VI   Extraocular motions are normal.        Significant Labs:   All pertinent labs within the past 24 hours have been reviewed.  BMP:   Recent Labs   Lab 01/22/22  1923   *   *   K 4.6   CL 97   CO2 27   BUN 20   CREATININE 0.7   CALCIUM 8.6*   MG 1.9     CBC:   Recent Labs   Lab 01/21/22  1503 01/22/22  0319 01/22/22  2353   WBC 2.26* 3.82* 3.83*   HGB 12.5 11.6* 10.5*   HCT 39.4 36.7* 32.2*    298 262     CMP:   Recent Labs   Lab 01/21/22  1503 01/22/22  1105 01/22/22  1923    134* 133*   K 4.7 4.6 4.6    99 97   CO2 22* 22* 27   * 103 112*   BUN 17 17 20   CREATININE 0.8 0.7 0.7   CALCIUM 9.0 9.1 8.6*   PROT 7.6 7.3  --    ALBUMIN 2.8* 2.7*  --    BILITOT 0.6 0.9  --    ALKPHOS 95 94  --    AST 49* 56*  --    ALT 33 30  --    ANIONGAP 13 13 9   EGFRNONAA >60.0 >60.0 >60     Urine Culture: No results for input(s): LABURIN in the last 48 hours.  Urine Studies:   Recent Labs   Lab 01/23/22  0141   COLORU Yellow   APPEARANCEUA Clear   PHUR 7.0   SPECGRAV 1.010   PROTEINUA Negative   GLUCUA Negative   KETONESU Negative   BILIRUBINUA Negative   OCCULTUA Trace*   NITRITE Positive*   UROBILINOGEN  Negative   LEUKOCYTESUR Negative   RBCUA 3   BACTERIA Occasional       Significant Imaging: I have reviewed all pertinent imaging results/findings within the past 24 hours.

## 2022-01-24 PROBLEM — I50.23 HEART FAILURE, SYSTOLIC, ACUTE ON CHRONIC: Status: ACTIVE | Noted: 2022-01-24

## 2022-01-24 PROBLEM — Z95.5 HISTORY OF CORONARY ARTERY STENT PLACEMENT: Status: ACTIVE | Noted: 2019-02-04

## 2022-01-24 PROBLEM — I10 HYPERTENSION: Status: ACTIVE | Noted: 2022-01-24

## 2022-01-24 PROBLEM — I25.2 HISTORY OF MYOCARDIAL INFARCTION: Status: ACTIVE | Noted: 2022-01-24

## 2022-01-24 PROBLEM — Z87.891 FORMER SMOKER: Status: ACTIVE | Noted: 2022-01-24

## 2022-01-24 LAB
ANION GAP SERPL CALC-SCNC: 11 MMOL/L (ref 8–16)
ANISOCYTOSIS BLD QL SMEAR: SLIGHT
APTT BLDCRRT: 39.7 SEC (ref 21–32)
APTT BLDCRRT: 44.5 SEC (ref 21–32)
APTT BLDCRRT: 45.3 SEC (ref 21–32)
AV INDEX (PROSTH): 0.84
AV MEAN GRADIENT: 6 MMHG
AV PEAK GRADIENT: 10 MMHG
AV VALVE AREA: 3.03 CM2
AV VELOCITY RATIO: 0.89
BASOPHILS # BLD AUTO: 0.02 K/UL (ref 0–0.2)
BASOPHILS NFR BLD: 0.7 % (ref 0–1.9)
BSA FOR ECHO PROCEDURE: 1.63 M2
BUN SERPL-MCNC: 14 MG/DL (ref 8–23)
CALCIUM SERPL-MCNC: 8.7 MG/DL (ref 8.7–10.5)
CHLORIDE SERPL-SCNC: 97 MMOL/L (ref 95–110)
CO2 SERPL-SCNC: 26 MMOL/L (ref 23–29)
CREAT SERPL-MCNC: 0.6 MG/DL (ref 0.5–1.4)
CV ECHO LV RWT: 0.2 CM
DIFFERENTIAL METHOD: ABNORMAL
DOP CALC AO PEAK VEL: 1.59 M/S
DOP CALC AO VTI: 27.57 CM
DOP CALC LVOT AREA: 3.6 CM2
DOP CALC LVOT DIAMETER: 2.14 CM
DOP CALC LVOT PEAK VEL: 1.42 M/S
DOP CALC LVOT STROKE VOLUME: 83.48 CM3
DOP CALCLVOT PEAK VEL VTI: 23.22 CM
E WAVE DECELERATION TIME: 163.88 MSEC
E/A RATIO: 2.23
E/E' RATIO: 26.25 M/S
ECHO LV POSTERIOR WALL: 0.69 CM (ref 0.6–1.1)
EJECTION FRACTION: 20 %
EOSINOPHIL # BLD AUTO: 0 K/UL (ref 0–0.5)
EOSINOPHIL NFR BLD: 0 % (ref 0–8)
ERYTHROCYTE [DISTWIDTH] IN BLOOD BY AUTOMATED COUNT: 19.7 % (ref 11.5–14.5)
ERYTHROCYTE [DISTWIDTH] IN BLOOD BY AUTOMATED COUNT: 19.7 % (ref 11.5–14.5)
EST. GFR  (AFRICAN AMERICAN): >60 ML/MIN/1.73 M^2
EST. GFR  (NON AFRICAN AMERICAN): >60 ML/MIN/1.73 M^2
FRACTIONAL SHORTENING: 5 % (ref 28–44)
GLUCOSE SERPL-MCNC: 111 MG/DL (ref 70–110)
HCT VFR BLD AUTO: 35 % (ref 37–48.5)
HCT VFR BLD AUTO: 35 % (ref 37–48.5)
HGB BLD-MCNC: 11.4 G/DL (ref 12–16)
HGB BLD-MCNC: 11.4 G/DL (ref 12–16)
HYPOCHROMIA BLD QL SMEAR: ABNORMAL
IMM GRANULOCYTES # BLD AUTO: 0 K/UL (ref 0–0.04)
IMM GRANULOCYTES NFR BLD AUTO: 0 % (ref 0–0.5)
INTERVENTRICULAR SEPTUM: 0.69 CM (ref 0.6–1.1)
IVRT: 83.73 MSEC
LA MAJOR: 6.13 CM
LA MINOR: 6.02 CM
LA WIDTH: 4.89 CM
LEFT ATRIUM SIZE: 4.5 CM
LEFT ATRIUM VOLUME INDEX MOD: 59.9 ML/M2
LEFT ATRIUM VOLUME INDEX: 70.1 ML/M2
LEFT ATRIUM VOLUME MOD: 97 CM3
LEFT ATRIUM VOLUME: 113.62 CM3
LEFT INTERNAL DIMENSION IN SYSTOLE: 6.51 CM (ref 2.1–4)
LEFT VENTRICLE DIASTOLIC VOLUME INDEX: 148.71 ML/M2
LEFT VENTRICLE DIASTOLIC VOLUME: 240.91 ML
LEFT VENTRICLE MASS INDEX: 121 G/M2
LEFT VENTRICLE SYSTOLIC VOLUME INDEX: 133.6 ML/M2
LEFT VENTRICLE SYSTOLIC VOLUME: 216.49 ML
LEFT VENTRICULAR INTERNAL DIMENSION IN DIASTOLE: 6.82 CM (ref 3.5–6)
LEFT VENTRICULAR MASS: 195.42 G
LV LATERAL E/E' RATIO: 26.25 M/S
LV SEPTAL E/E' RATIO: 26.25 M/S
LYMPHOCYTES # BLD AUTO: 1.9 K/UL (ref 1–4.8)
LYMPHOCYTES NFR BLD: 66.3 % (ref 18–48)
MCH RBC QN AUTO: 26.8 PG (ref 27–31)
MCH RBC QN AUTO: 26.8 PG (ref 27–31)
MCHC RBC AUTO-ENTMCNC: 32.6 G/DL (ref 32–36)
MCHC RBC AUTO-ENTMCNC: 32.6 G/DL (ref 32–36)
MCV RBC AUTO: 82 FL (ref 82–98)
MCV RBC AUTO: 82 FL (ref 82–98)
MONOCYTES # BLD AUTO: 0.9 K/UL (ref 0.3–1)
MONOCYTES NFR BLD: 31.3 % (ref 4–15)
MV PEAK A VEL: 0.47 M/S
MV PEAK E VEL: 1.05 M/S
MV STENOSIS PRESSURE HALF TIME: 47.53 MS
MV VALVE AREA P 1/2 METHOD: 4.63 CM2
NEUTROPHILS # BLD AUTO: 0.1 K/UL (ref 1.8–7.7)
NEUTROPHILS NFR BLD: 1.7 % (ref 38–73)
NRBC BLD-RTO: 0 /100 WBC
OVALOCYTES BLD QL SMEAR: ABNORMAL
PISA MRMAX VEL: 0.04 M/S
PISA TR MAX VEL: 2.23 M/S
PLATELET # BLD AUTO: 241 K/UL (ref 150–450)
PLATELET # BLD AUTO: 241 K/UL (ref 150–450)
PLATELET BLD QL SMEAR: ABNORMAL
PMV BLD AUTO: 9.9 FL (ref 9.2–12.9)
PMV BLD AUTO: 9.9 FL (ref 9.2–12.9)
POC ACTIVATED CLOTTING TIME K: 196 SEC (ref 74–137)
POC ACTIVATED CLOTTING TIME K: 267 SEC (ref 74–137)
POCT GLUCOSE: 123 MG/DL (ref 70–110)
POTASSIUM SERPL-SCNC: 3.9 MMOL/L (ref 3.5–5.1)
PULM VEIN S/D RATIO: 3.15
PV PEAK D VEL: 0.2 M/S
PV PEAK S VEL: 0.63 M/S
PV PEAK VELOCITY: 0.57 CM/S
RA MAJOR: 4.44 CM
RA PRESSURE: 8 MMHG
RA WIDTH: 3.22 CM
RBC # BLD AUTO: 4.25 M/UL (ref 4–5.4)
RBC # BLD AUTO: 4.25 M/UL (ref 4–5.4)
SAMPLE: ABNORMAL
SAMPLE: ABNORMAL
SINUS: 2.98 CM
SODIUM SERPL-SCNC: 134 MMOL/L (ref 136–145)
STJ: 2.06 CM
TDI LATERAL: 0.04 M/S
TDI SEPTAL: 0.04 M/S
TDI: 0.04 M/S
TR MAX PG: 20 MMHG
TRICUSPID ANNULAR PLANE SYSTOLIC EXCURSION: 1.38 CM
TV REST PULMONARY ARTERY PRESSURE: 28 MMHG
WBC # BLD AUTO: 2.91 K/UL (ref 3.9–12.7)
WBC # BLD AUTO: 2.91 K/UL (ref 3.9–12.7)

## 2022-01-24 PROCEDURE — 92928 PRQ TCAT PLMT NTRAC ST 1 LES: CPT | Mod: LC,,, | Performed by: INTERNAL MEDICINE

## 2022-01-24 PROCEDURE — 25000003 PHARM REV CODE 250: Performed by: FAMILY MEDICINE

## 2022-01-24 PROCEDURE — C9600 PERC DRUG-EL COR STENT SING: HCPCS | Performed by: INTERNAL MEDICINE

## 2022-01-24 PROCEDURE — 20000000 HC ICU ROOM

## 2022-01-24 PROCEDURE — C1894 INTRO/SHEATH, NON-LASER: HCPCS | Performed by: INTERNAL MEDICINE

## 2022-01-24 PROCEDURE — 25000242 PHARM REV CODE 250 ALT 637 W/ HCPCS: Performed by: PHYSICIAN ASSISTANT

## 2022-01-24 PROCEDURE — 25000003 PHARM REV CODE 250: Performed by: INTERNAL MEDICINE

## 2022-01-24 PROCEDURE — 85027 COMPLETE CBC AUTOMATED: CPT | Performed by: INTERNAL MEDICINE

## 2022-01-24 PROCEDURE — 94761 N-INVAS EAR/PLS OXIMETRY MLT: CPT

## 2022-01-24 PROCEDURE — C1725 CATH, TRANSLUMIN NON-LASER: HCPCS | Performed by: INTERNAL MEDICINE

## 2022-01-24 PROCEDURE — 63600175 PHARM REV CODE 636 W HCPCS: Performed by: INTERNAL MEDICINE

## 2022-01-24 PROCEDURE — 99233 SBSQ HOSP IP/OBS HIGH 50: CPT | Mod: ,,, | Performed by: INTERNAL MEDICINE

## 2022-01-24 PROCEDURE — 36415 COLL VENOUS BLD VENIPUNCTURE: CPT | Performed by: INTERNAL MEDICINE

## 2022-01-24 PROCEDURE — 99233 SBSQ HOSP IP/OBS HIGH 50: CPT | Mod: 25,,, | Performed by: INTERNAL MEDICINE

## 2022-01-24 PROCEDURE — 93454 CORONARY ARTERY ANGIO S&I: CPT | Mod: 26,59,51, | Performed by: INTERNAL MEDICINE

## 2022-01-24 PROCEDURE — 85007 BL SMEAR W/DIFF WBC COUNT: CPT | Performed by: INTERNAL MEDICINE

## 2022-01-24 PROCEDURE — C1769 GUIDE WIRE: HCPCS | Performed by: INTERNAL MEDICINE

## 2022-01-24 PROCEDURE — 25000003 PHARM REV CODE 250: Performed by: PHYSICIAN ASSISTANT

## 2022-01-24 PROCEDURE — 99233 PR SUBSEQUENT HOSPITAL CARE,LEVL III: ICD-10-PCS | Mod: ,,, | Performed by: INTERNAL MEDICINE

## 2022-01-24 PROCEDURE — 99152 PR MOD CONSCIOUS SEDATION, SAME PHYS, 5+ YRS, FIRST 15 MIN: ICD-10-PCS | Mod: ,,, | Performed by: INTERNAL MEDICINE

## 2022-01-24 PROCEDURE — C1887 CATHETER, GUIDING: HCPCS | Performed by: INTERNAL MEDICINE

## 2022-01-24 PROCEDURE — 36415 COLL VENOUS BLD VENIPUNCTURE: CPT | Performed by: FAMILY MEDICINE

## 2022-01-24 PROCEDURE — 85730 THROMBOPLASTIN TIME PARTIAL: CPT | Mod: 91 | Performed by: INTERNAL MEDICINE

## 2022-01-24 PROCEDURE — 93454 CORONARY ARTERY ANGIO S&I: CPT | Performed by: INTERNAL MEDICINE

## 2022-01-24 PROCEDURE — 85025 COMPLETE CBC W/AUTO DIFF WBC: CPT | Performed by: FAMILY MEDICINE

## 2022-01-24 PROCEDURE — C1874 STENT, COATED/COV W/DEL SYS: HCPCS | Performed by: INTERNAL MEDICINE

## 2022-01-24 PROCEDURE — 93454 PR CATH PLACE/CORONARY ANGIO, IMG SUPER/INTERP: ICD-10-PCS | Mod: 26,59,51, | Performed by: INTERNAL MEDICINE

## 2022-01-24 PROCEDURE — 99233 PR SUBSEQUENT HOSPITAL CARE,LEVL III: ICD-10-PCS | Mod: 25,,, | Performed by: INTERNAL MEDICINE

## 2022-01-24 PROCEDURE — 92928 PR STENT: ICD-10-PCS | Mod: LC,,, | Performed by: INTERNAL MEDICINE

## 2022-01-24 PROCEDURE — 85730 THROMBOPLASTIN TIME PARTIAL: CPT | Performed by: INTERNAL MEDICINE

## 2022-01-24 PROCEDURE — 99152 MOD SED SAME PHYS/QHP 5/>YRS: CPT | Mod: ,,, | Performed by: INTERNAL MEDICINE

## 2022-01-24 PROCEDURE — 85730 THROMBOPLASTIN TIME PARTIAL: CPT | Mod: 91 | Performed by: FAMILY MEDICINE

## 2022-01-24 PROCEDURE — 93005 ELECTROCARDIOGRAM TRACING: CPT

## 2022-01-24 PROCEDURE — 25500020 PHARM REV CODE 255: Performed by: INTERNAL MEDICINE

## 2022-01-24 PROCEDURE — 80048 BASIC METABOLIC PNL TOTAL CA: CPT | Performed by: INTERNAL MEDICINE

## 2022-01-24 DEVICE — STENT RONYX27522UX RESOLUTE ONYX 2.75X22
Type: IMPLANTABLE DEVICE | Site: HEART | Status: FUNCTIONAL
Brand: RESOLUTE ONYX™

## 2022-01-24 DEVICE — STENT RONYX27518UX RESOLUTE ONYX 2.75X18
Type: IMPLANTABLE DEVICE | Site: HEART | Status: FUNCTIONAL
Brand: RESOLUTE ONYX™

## 2022-01-24 RX ORDER — MIDAZOLAM HYDROCHLORIDE 1 MG/ML
INJECTION, SOLUTION INTRAMUSCULAR; INTRAVENOUS
Status: DISCONTINUED | OUTPATIENT
Start: 2022-01-24 | End: 2022-01-24 | Stop reason: HOSPADM

## 2022-01-24 RX ORDER — ONDANSETRON 8 MG/1
8 TABLET, ORALLY DISINTEGRATING ORAL EVERY 8 HOURS PRN
Status: DISCONTINUED | OUTPATIENT
Start: 2022-01-24 | End: 2022-01-26 | Stop reason: HOSPADM

## 2022-01-24 RX ORDER — SODIUM CHLORIDE 9 MG/ML
INJECTION, SOLUTION INTRAVENOUS CONTINUOUS
Status: DISCONTINUED | OUTPATIENT
Start: 2022-01-24 | End: 2022-01-24

## 2022-01-24 RX ORDER — HEPARIN SODIUM 1000 [USP'U]/ML
INJECTION, SOLUTION INTRAVENOUS; SUBCUTANEOUS
Status: DISCONTINUED | OUTPATIENT
Start: 2022-01-24 | End: 2022-01-24 | Stop reason: HOSPADM

## 2022-01-24 RX ORDER — DIPHENHYDRAMINE HYDROCHLORIDE 50 MG/ML
INJECTION INTRAMUSCULAR; INTRAVENOUS
Status: DISCONTINUED | OUTPATIENT
Start: 2022-01-24 | End: 2022-01-24 | Stop reason: HOSPADM

## 2022-01-24 RX ORDER — PRASUGREL 10 MG/1
10 TABLET, FILM COATED ORAL DAILY
Status: DISCONTINUED | OUTPATIENT
Start: 2022-01-25 | End: 2022-01-26 | Stop reason: HOSPADM

## 2022-01-24 RX ORDER — IPRATROPIUM BROMIDE AND ALBUTEROL SULFATE 2.5; .5 MG/3ML; MG/3ML
3 SOLUTION RESPIRATORY (INHALATION) EVERY 4 HOURS PRN
Status: DISCONTINUED | OUTPATIENT
Start: 2022-01-24 | End: 2022-01-26 | Stop reason: HOSPADM

## 2022-01-24 RX ORDER — NITROGLYCERIN 5 MG/ML
INJECTION, SOLUTION INTRAVENOUS
Status: DISCONTINUED | OUTPATIENT
Start: 2022-01-24 | End: 2022-01-24 | Stop reason: HOSPADM

## 2022-01-24 RX ORDER — FUROSEMIDE 20 MG/1
20 TABLET ORAL DAILY
Status: DISCONTINUED | OUTPATIENT
Start: 2022-01-25 | End: 2022-01-26 | Stop reason: HOSPADM

## 2022-01-24 RX ORDER — FENTANYL CITRATE 50 UG/ML
INJECTION, SOLUTION INTRAMUSCULAR; INTRAVENOUS
Status: DISCONTINUED | OUTPATIENT
Start: 2022-01-24 | End: 2022-01-24 | Stop reason: HOSPADM

## 2022-01-24 RX ORDER — PRASUGREL 10 MG/1
TABLET, FILM COATED ORAL
Status: DISCONTINUED | OUTPATIENT
Start: 2022-01-24 | End: 2022-01-24 | Stop reason: HOSPADM

## 2022-01-24 RX ORDER — DIPHENHYDRAMINE HCL 25 MG
25 CAPSULE ORAL
Status: DISCONTINUED | OUTPATIENT
Start: 2022-01-24 | End: 2022-01-24 | Stop reason: HOSPADM

## 2022-01-24 RX ORDER — ACETAMINOPHEN 325 MG/1
650 TABLET ORAL EVERY 4 HOURS PRN
Status: DISCONTINUED | OUTPATIENT
Start: 2022-01-24 | End: 2022-01-25

## 2022-01-24 RX ORDER — SODIUM CHLORIDE 9 MG/ML
INJECTION, SOLUTION INTRAVENOUS CONTINUOUS
Status: ACTIVE | OUTPATIENT
Start: 2022-01-24 | End: 2022-01-24

## 2022-01-24 RX ORDER — OXYCODONE HYDROCHLORIDE 5 MG/1
5 TABLET ORAL EVERY 4 HOURS PRN
Status: DISCONTINUED | OUTPATIENT
Start: 2022-01-24 | End: 2022-01-25

## 2022-01-24 RX ADMIN — CARVEDILOL 12.5 MG: 12.5 TABLET, FILM COATED ORAL at 08:01

## 2022-01-24 RX ADMIN — SODIUM CHLORIDE: 0.9 INJECTION, SOLUTION INTRAVENOUS at 11:01

## 2022-01-24 RX ADMIN — SODIUM CHLORIDE: 0.9 INJECTION, SOLUTION INTRAVENOUS at 08:01

## 2022-01-24 RX ADMIN — NITROGLYCERIN 1 PATCH: 0.4 PATCH TRANSDERMAL at 08:01

## 2022-01-24 RX ADMIN — ACETAMINOPHEN 650 MG: 325 TABLET, FILM COATED ORAL at 03:01

## 2022-01-24 RX ADMIN — MUPIROCIN: 20 OINTMENT TOPICAL at 08:01

## 2022-01-24 RX ADMIN — PRAVASTATIN SODIUM 40 MG: 20 TABLET ORAL at 08:01

## 2022-01-24 RX ADMIN — ASPIRIN 81 MG: 81 TABLET, DELAYED RELEASE ORAL at 08:01

## 2022-01-24 RX ADMIN — EZETIMIBE 10 MG: 10 TABLET ORAL at 08:01

## 2022-01-24 RX ADMIN — PANTOPRAZOLE SODIUM 40 MG: 40 TABLET, DELAYED RELEASE ORAL at 08:01

## 2022-01-24 RX ADMIN — FLUTICASONE PROPIONATE 50 MCG: 50 SPRAY, METERED NASAL at 08:01

## 2022-01-24 RX ADMIN — ONDANSETRON 8 MG: 8 TABLET, ORALLY DISINTEGRATING ORAL at 11:01

## 2022-01-24 RX ADMIN — ALPRAZOLAM 0.5 MG: 0.5 TABLET ORAL at 08:01

## 2022-01-24 NOTE — PLAN OF CARE
During routine rounds, assessed pt for spiritual distress, and assured pt aware  of spiritual care available.  While providing reflective listening and compassionate presence, pt concerns were explored.  Prayer was offered and led.     No distress or particular spiritual care needs were reported nor presented at this time.  Pt reported and presented with relational, amna, and emotional support.  Pt reported gratitude for the spiritual wellness check.   Follow-up was offered upon request, and will also be offered at staff's discretion, should pt's conditions change, and/or if hospital admission continues significantly.

## 2022-01-24 NOTE — PLAN OF CARE
TR Band deflated per protocol. No hematoma or bleeding noted at site. Site tender to touch, tegaderm placed over site. Will continue to monitor.
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
## 2022-01-24 NOTE — PROGRESS NOTES
Review Echo and cath findings in detail with patient and son.    Discussed need for DAPT, importance of statin and management of HF and value of appropriate therapy.    Yohannes Cordova M.D.

## 2022-01-24 NOTE — ASSESSMENT & PLAN NOTE
Patient is s/p ACID placement.   CXR on admission with mild edema and BNP elevated at 1692.  She was treated with IV Lasix at [place] and a dose given on 1/23/2022 at [facility].  TTE in 2/2019 with Moderate left ventricular enlargement.  Normal right ventricular systolic function.  Severely decreased left ventricular systolic function. The estimated ejection fraction is 20%  Grade I (mild) left ventricular diastolic dysfunction consistent with impaired relaxation.  Local segmental wall motion abnormalities.  Home Meds: Carvedilol 12.5mg bid  -Follow up with repeat TTE  -Monitor I&Os      - monitor on tele

## 2022-01-24 NOTE — BRIEF OP NOTE
1/24/2022: [facility]: Cath: LAD: Proximal stent patent. LCX: Proximal 80%. LCX: Dominant. Moderate disease. LCX: HEVER 2.75 x 18 mm. Distal dissection. HEVER 2.75 x 22 mm.            Yohannes Cordova M.D.

## 2022-01-24 NOTE — PLAN OF CARE
[facility] ([place])  Initial Discharge Assessment       Primary Care Provider: Braxton Barragan MD    Admission Diagnosis: NSTEMI (non-ST elevated myocardial infarction) [I21.4]    Admission Date: 1/22/2022  Expected Discharge Date:      Discharge Barriers Identified: (P) None    Payor: [employer]ARE / Plan: [employer] CHOICES 65 / Product Type: Medicare Advantage /     Extended Emergency Contact Information  Primary Emergency Contact: Sheila Ortiz  Address: [address] [place] [address] [facility]  Home Phone: [phone]  Mobile Phone: [phone]  Relation: Son  Preferred language: English   needed? No    Discharge Plan A: (P) Home  Discharge Plan B: (P) Home      [facility] - [place], [place] - [address]  [address]  [facility] [address]  Phone: [phone] Fax: [phone]      Initial Assessment (most recent)       Adult Discharge Assessment - 01/24/22 1649          Discharge Assessment    Assessment Type Discharge Planning Assessment (P)      Confirmed/corrected address, phone number and insurance Yes (P)      Confirmed Demographics Correct on Facesheet (P)      Source of Information patient;family (P)      Communicated SIVAN with patient/caregiver Date not available/Unable to determine (P)      Reason For Admission transferred here for cardiology (P)      Lives With alone (P)      Facility Arrived From: [facility] (P)      Do you expect to return to your current living situation? Yes (P)      Do you have help at home or someone to help you manage your care at home? Yes (P)      Who are your caregiver(s) and their phone number(s)? sheila hanna, [phone] (P)      Prior to hospitilization cognitive status: Alert/Oriented;No Deficits (P)      Current cognitive status: Alert/Oriented;No Deficits (P)      Walking or Climbing Stairs Difficulty none (P)      Dressing/Bathing  Difficulty none (P)      Equipment Currently Used at Home none (P)      Readmission within 30 days? No (P)      Patient currently being followed by outpatient case management? No (P)      Do you currently have service(s) that help you manage your care at home? No (P)      Do you take prescription medications? Yes (P)      Do you have prescription coverage? Yes (P)      Coverage PHN (P)      Do you have any problems affording any of your prescribed medications? No (P)      Is the patient taking medications as prescribed? yes (P)      Who is going to help you get home at discharge? family (P)      How do you get to doctors appointments? car, drives self (P)      Are you on dialysis? No (P)      Do you take coumadin? No (P)      Discharge Plan A Home (P)      Discharge Plan B Home (P)      DME Needed Upon Discharge  none (P)      Discharge Plan discussed with: Adult children;Patient (P)      Discharge Barriers Identified None (P)                  CM met with pt and son, no CM needs anticipated for discharge. Pt did request PHN meals to be arranged when discharged.

## 2022-01-24 NOTE — SUBJECTIVE & OBJECTIVE
Interval History: Patient is awake and alert this morning.  No further chest pain.  No SOB at rest, but having LI.  Scheduled for Angiogram this morning.    Review of Systems   Constitutional: Negative for chills, diaphoresis and fever.   HENT: Negative for ear pain.    Eyes: Negative for pain.   Respiratory: Negative for cough, shortness of breath and wheezing.    Cardiovascular: Negative for chest pain and palpitations.   Gastrointestinal: Positive for nausea. Negative for abdominal distention, abdominal pain, constipation, diarrhea and vomiting.   Genitourinary: Negative for dysuria, flank pain, frequency, hematuria and urgency.   Musculoskeletal: Negative for arthralgias, myalgias, neck pain and neck stiffness.   Skin: Negative for color change and rash.   Neurological: Negative for dizziness, syncope, weakness, light-headedness and headaches.   Psychiatric/Behavioral: Negative for agitation and confusion.     Objective:     Vital Signs (Most Recent):  Temp: 99.6 °F (37.6 °C) (01/24/22 1105)  Pulse: 66 (01/24/22 1230)  Resp: (!) 26 (01/24/22 1230)  BP: (!) 85/51 (01/24/22 1230)  SpO2: 97 % (01/24/22 1230) Vital Signs (24h Range):  Temp:  [98 °F (36.7 °C)-99.6 °F (37.6 °C)] 99.6 °F (37.6 °C)  Pulse:  [65-89] 66  Resp:  [12-35] 26  SpO2:  [92 %-97 %] 97 %  BP: ()/(46-75) 85/51     Weight: 59.9 kg (132 lb)  Body mass index is 23.38 kg/m².    Intake/Output Summary (Last 24 hours) at 1/24/2022 1338  Last data filed at 1/24/2022 1248  Gross per 24 hour   Intake 720.41 ml   Output 2630 ml   Net -1909.59 ml      Physical Exam  Vitals and nursing note reviewed.   Constitutional:       General: She is not in acute distress.     Appearance: Normal appearance. She is well-developed and normal weight. She is not ill-appearing or diaphoretic.   HENT:      Head: Normocephalic and atraumatic.      Right Ear: External ear normal.      Left Ear: External ear normal.      Nose: Nose normal.      Mouth/Throat:      Mouth:  Mucous membranes are moist.      Pharynx: No oropharyngeal exudate.   Eyes:      General: No scleral icterus.        Right eye: No discharge.         Left eye: No discharge.      Conjunctiva/sclera: Conjunctivae normal.   Neck:      Trachea: No tracheal deviation.   Cardiovascular:      Rate and Rhythm: Normal rate and regular rhythm.      Heart sounds: Normal heart sounds. No murmur heard.  No friction rub. No gallop.    Pulmonary:      Effort: Pulmonary effort is normal. No respiratory distress.      Breath sounds: Normal breath sounds. No stridor. No wheezing or rales.   Abdominal:      General: Bowel sounds are normal. There is no distension.      Palpations: Abdomen is soft. There is no mass.      Tenderness: There is no abdominal tenderness. There is no guarding.   Musculoskeletal:         General: Normal range of motion.      Cervical back: Normal range of motion and neck supple.      Right lower leg: No edema.      Left lower leg: No edema.   Skin:     General: Skin is warm and dry.      Coloration: Skin is not pale.      Findings: No erythema or rash.   Neurological:      General: No focal deficit present.      Mental Status: She is alert and oriented to person, place, and time.      Cranial Nerves: No cranial nerve deficit.      Motor: No abnormal muscle tone.   Psychiatric:         Mood and Affect: Mood normal.         Behavior: Behavior normal.         Significant Labs: All pertinent labs within the past 24 hours have been reviewed.    Significant Imaging: I have reviewed all pertinent imaging results/findings within the past 24 hours.

## 2022-01-24 NOTE — PROGRESS NOTES
[facility] - Intensive Care [facility] Medicine  Progress Note    Patient Name: Odette Hart  MRN: [id number]  Patient Class: IP- Inpatient   Admission Date: 1/22/2022  Length of Stay: 2 days  Attending Physician: Marco Antonio Alvarado MD  Primary Care Provider: Braxton Barragan MD        Subjective:     Principal Problem:Non-ST elevation myocardial infarction (NSTEMI)        HPI:  Ms. Odette Hart is a 66 y.o. female, with PMH of CAD, prior MI (2012) s/p LAD stenting, ischemic cardiomyopathy s/p AICD placement in 2014, HFrEF (EF 20%) , HTN, HLD, RA, who presented to [facility] in transfer from [facility] ICU due to chest pain. She states she has awoken in the night, with chest pain (often relieved by drinking water), which she attributed to heart burn. At 0222 on 1/21/22 she awoke with this same burning sensation and was unable to get relief, this time she notes it feels dull and heavy and locates it to the sternal region and denies radiation. There is not dyspnea or shortness of breath associated with her chest pain. She then began to experience nausea and vomiting, and shortness of breath that afternoon. She called EMS, and was taken to the ED in [place]. She states that while there, her pain resolved. An EKG showed no acute ST changes. Transfer was arranged to [facility] due to lack of bed availability at another [facility] facility, or [facility] where there are cardiac catheterization capabilities on weekends. She was started on a high intensity heparin drip, ASA, and her home statin were contined. She is admitted to inpatient status.       Overview/Hospital Course:  Patient presented to [place] with chest pain that started while sleeping.  Trops elevated to a peak of 8.992.  EKG with changes in inferior leads which are new.  Patient was started on IV Heparin and NTG patch and transferred to [facility] for Cardiology intervention.  Patient will have C on Monday.      Interval History: Patient is awake and alert this  morning.  No further chest pain.  No SOB at rest, but having LI.  Scheduled for Angiogram this morning.    Review of Systems   Constitutional: Negative for chills, diaphoresis and fever.   HENT: Negative for ear pain.    Eyes: Negative for pain.   Respiratory: Negative for cough, shortness of breath and wheezing.    Cardiovascular: Negative for chest pain and palpitations.   Gastrointestinal: Positive for nausea. Negative for abdominal distention, abdominal pain, constipation, diarrhea and vomiting.   Genitourinary: Negative for dysuria, flank pain, frequency, hematuria and urgency.   Musculoskeletal: Negative for arthralgias, myalgias, neck pain and neck stiffness.   Skin: Negative for color change and rash.   Neurological: Negative for dizziness, syncope, weakness, light-headedness and headaches.   Psychiatric/Behavioral: Negative for agitation and confusion.     Objective:     Vital Signs (Most Recent):  Temp: 99.6 °F (37.6 °C) (01/24/22 1105)  Pulse: 66 (01/24/22 1230)  Resp: (!) 26 (01/24/22 1230)  BP: (!) 85/51 (01/24/22 1230)  SpO2: 97 % (01/24/22 1230) Vital Signs (24h Range):  Temp:  [98 °F (36.7 °C)-99.6 °F (37.6 °C)] 99.6 °F (37.6 °C)  Pulse:  [65-89] 66  Resp:  [12-35] 26  SpO2:  [92 %-97 %] 97 %  BP: ()/(46-75) 85/51     Weight: 59.9 kg (132 lb)  Body mass index is 23.38 kg/m².    Intake/Output Summary (Last 24 hours) at 1/24/2022 1338  Last data filed at 1/24/2022 1248  Gross per 24 hour   Intake 720.41 ml   Output 2630 ml   Net -1909.59 ml      Physical Exam  Vitals and nursing note reviewed.   Constitutional:       General: She is not in acute distress.     Appearance: Normal appearance. She is well-developed and normal weight. She is not ill-appearing or diaphoretic.   HENT:      Head: Normocephalic and atraumatic.      Right Ear: External ear normal.      Left Ear: External ear normal.      Nose: Nose normal.      Mouth/Throat:      Mouth: Mucous membranes are moist.      Pharynx: No  oropharyngeal exudate.   Eyes:      General: No scleral icterus.        Right eye: No discharge.         Left eye: No discharge.      Conjunctiva/sclera: Conjunctivae normal.   Neck:      Trachea: No tracheal deviation.   Cardiovascular:      Rate and Rhythm: Normal rate and regular rhythm.      Heart sounds: Normal heart sounds. No murmur heard.  No friction rub. No gallop.    Pulmonary:      Effort: Pulmonary effort is normal. No respiratory distress.      Breath sounds: Normal breath sounds. No stridor. No wheezing or rales.   Abdominal:      General: Bowel sounds are normal. There is no distension.      Palpations: Abdomen is soft. There is no mass.      Tenderness: There is no abdominal tenderness. There is no guarding.   Musculoskeletal:         General: Normal range of motion.      Cervical back: Normal range of motion and neck supple.      Right lower leg: No edema.      Left lower leg: No edema.   Skin:     General: Skin is warm and dry.      Coloration: Skin is not pale.      Findings: No erythema or rash.   Neurological:      General: No focal deficit present.      Mental Status: She is alert and oriented to person, place, and time.      Cranial Nerves: No cranial nerve deficit.      Motor: No abnormal muscle tone.   Psychiatric:         Mood and Affect: Mood normal.         Behavior: Behavior normal.         Significant Labs: All pertinent labs within the past 24 hours have been reviewed.    Significant Imaging: I have reviewed all pertinent imaging results/findings within the past 24 hours.      Assessment/Plan:      * Non-ST elevation myocardial infarction (NSTEMI)  Patient presented to [place] with chest pain that started while sleeping.  Trops elevated to a peak of 8.992.  EKG with changes in inferior leads which are new.  Patient was started on IV Heparin and NTG patch and transferred to [facility] for Cardiology intervention.  Patient s/p [facility] this morning:  LAD: Proximal stent patent. LCX:  Proximal 80%. LCX: Dominant. Moderate disease. LCX: HEVER 2.75 x 18 mm. Distal dissection. HEVER 2.75 x 22 mm.    Plan:  Follow up with Cardiology recommendations - Start Prasugrel and Stop IV Heparin.  Continue ASA.    Continue Carvedilol  Continue with Telemetry          Ischemic cardiomyopathy  Patient is s/p ACID placement.   CXR on admission with mild edema and BNP elevated at 1692.  She was treated with IV Lasix at [place] and a dose given on 1/23/2022 at [facility].  TTE in 2/2019 with Moderate left ventricular enlargement.  Normal right ventricular systolic function.  Severely decreased left ventricular systolic function. The estimated ejection fraction is 20%  Grade I (mild) left ventricular diastolic dysfunction consistent with impaired relaxation.  Local segmental wall motion abnormalities.  Home Meds: Carvedilol 12.5mg bid  -Follow up with repeat TTE  -Monitor I&Os      - monitor on tele       HLD (hyperlipidemia)  -Continue Statin        VTE Risk Mitigation (From admission, onward)         Ordered     IP VTE HIGH RISK PATIENT  Once         01/22/22 1907     Reason for no Mechanical VTE Prophylaxis  Once        Question:  Reasons:  Answer:  IV Heparin within 24 hrs. Pre or Post-Op    01/22/22 1907                Discharge Planning   SIVAN:      Code Status: Full Code   Is the patient medically ready for discharge?:     Reason for patient still in hospital (select all that apply): Patient trending condition and Treatment                     Marco Antonio Alvarado MD  Department of Hospital Medicine   [facility] - Intensive Care ([facility]

## 2022-01-25 PROBLEM — I50.43 HEART FAILURE, SYSTOLIC AND DIASTOLIC, ACUTE ON CHRONIC: Status: ACTIVE | Noted: 2022-01-24

## 2022-01-25 LAB
ANION GAP SERPL CALC-SCNC: 9 MMOL/L (ref 8–16)
BASOPHILS # BLD AUTO: ABNORMAL K/UL (ref 0–0.2)
BASOPHILS NFR BLD: 0 % (ref 0–1.9)
BUN SERPL-MCNC: 12 MG/DL (ref 8–23)
CALCIUM SERPL-MCNC: 8.3 MG/DL (ref 8.7–10.5)
CHLORIDE SERPL-SCNC: 99 MMOL/L (ref 95–110)
CHOLEST SERPL-MCNC: 148 MG/DL (ref 120–199)
CHOLEST/HDLC SERPL: 5.9 {RATIO} (ref 2–5)
CO2 SERPL-SCNC: 22 MMOL/L (ref 23–29)
CREAT SERPL-MCNC: 0.8 MG/DL (ref 0.5–1.4)
DIFFERENTIAL METHOD: ABNORMAL
EOSINOPHIL # BLD AUTO: ABNORMAL K/UL (ref 0–0.5)
EOSINOPHIL NFR BLD: 0 % (ref 0–8)
ERYTHROCYTE [DISTWIDTH] IN BLOOD BY AUTOMATED COUNT: 19.6 % (ref 11.5–14.5)
EST. GFR  (AFRICAN AMERICAN): >60 ML/MIN/1.73 M^2
EST. GFR  (NON AFRICAN AMERICAN): >60 ML/MIN/1.73 M^2
GLUCOSE SERPL-MCNC: 127 MG/DL (ref 70–110)
HCT VFR BLD AUTO: 33.2 % (ref 37–48.5)
HDLC SERPL-MCNC: 25 MG/DL (ref 40–75)
HDLC SERPL: 16.9 % (ref 20–50)
HGB BLD-MCNC: 10.7 G/DL (ref 12–16)
HYPOCHROMIA BLD QL SMEAR: ABNORMAL
IMM GRANULOCYTES # BLD AUTO: ABNORMAL K/UL (ref 0–0.04)
IMM GRANULOCYTES NFR BLD AUTO: ABNORMAL % (ref 0–0.5)
LDLC SERPL CALC-MCNC: 98.6 MG/DL (ref 63–159)
LYMPHOCYTES # BLD AUTO: ABNORMAL K/UL (ref 1–4.8)
LYMPHOCYTES NFR BLD: 53 % (ref 18–48)
MCH RBC QN AUTO: 26.6 PG (ref 27–31)
MCHC RBC AUTO-ENTMCNC: 32.2 G/DL (ref 32–36)
MCV RBC AUTO: 83 FL (ref 82–98)
MONOCYTES # BLD AUTO: ABNORMAL K/UL (ref 0.3–1)
MONOCYTES NFR BLD: 27 % (ref 4–15)
NEUTROPHILS NFR BLD: 20 % (ref 38–73)
NONHDLC SERPL-MCNC: 123 MG/DL
NRBC BLD-RTO: 0 /100 WBC
OVALOCYTES BLD QL SMEAR: ABNORMAL
PLATELET # BLD AUTO: 222 K/UL (ref 150–450)
PLATELET BLD QL SMEAR: ABNORMAL
PMV BLD AUTO: 10.1 FL (ref 9.2–12.9)
POCT GLUCOSE: 142 MG/DL (ref 70–110)
POTASSIUM SERPL-SCNC: 4.2 MMOL/L (ref 3.5–5.1)
RBC # BLD AUTO: 4.02 M/UL (ref 4–5.4)
SODIUM SERPL-SCNC: 130 MMOL/L (ref 136–145)
TRIGL SERPL-MCNC: 122 MG/DL (ref 30–150)
WBC # BLD AUTO: 4.38 K/UL (ref 3.9–12.7)

## 2022-01-25 PROCEDURE — 94761 N-INVAS EAR/PLS OXIMETRY MLT: CPT

## 2022-01-25 PROCEDURE — 99233 SBSQ HOSP IP/OBS HIGH 50: CPT | Mod: ,,, | Performed by: HOSPITALIST

## 2022-01-25 PROCEDURE — 99233 PR SUBSEQUENT HOSPITAL CARE,LEVL III: ICD-10-PCS | Mod: ,,, | Performed by: INTERNAL MEDICINE

## 2022-01-25 PROCEDURE — 25000003 PHARM REV CODE 250: Performed by: INTERNAL MEDICINE

## 2022-01-25 PROCEDURE — 94640 AIRWAY INHALATION TREATMENT: CPT

## 2022-01-25 PROCEDURE — 36415 COLL VENOUS BLD VENIPUNCTURE: CPT | Performed by: INTERNAL MEDICINE

## 2022-01-25 PROCEDURE — 99900035 HC TECH TIME PER 15 MIN (STAT)

## 2022-01-25 PROCEDURE — 97162 PT EVAL MOD COMPLEX 30 MIN: CPT

## 2022-01-25 PROCEDURE — 25000242 PHARM REV CODE 250 ALT 637 W/ HCPCS: Performed by: INTERNAL MEDICINE

## 2022-01-25 PROCEDURE — 80048 BASIC METABOLIC PNL TOTAL CA: CPT | Performed by: INTERNAL MEDICINE

## 2022-01-25 PROCEDURE — 97530 THERAPEUTIC ACTIVITIES: CPT

## 2022-01-25 PROCEDURE — 99233 PR SUBSEQUENT HOSPITAL CARE,LEVL III: ICD-10-PCS | Mod: ,,, | Performed by: HOSPITALIST

## 2022-01-25 PROCEDURE — 80061 LIPID PANEL: CPT | Performed by: INTERNAL MEDICINE

## 2022-01-25 PROCEDURE — 99233 SBSQ HOSP IP/OBS HIGH 50: CPT | Mod: ,,, | Performed by: INTERNAL MEDICINE

## 2022-01-25 PROCEDURE — 20000000 HC ICU ROOM

## 2022-01-25 PROCEDURE — 25000003 PHARM REV CODE 250: Performed by: FAMILY MEDICINE

## 2022-01-25 RX ORDER — ACETAMINOPHEN 500 MG
1000 TABLET ORAL EVERY 8 HOURS PRN
Status: DISCONTINUED | OUTPATIENT
Start: 2022-01-25 | End: 2022-01-26 | Stop reason: HOSPADM

## 2022-01-25 RX ORDER — VALSARTAN 40 MG/1
40 TABLET ORAL DAILY
Status: DISCONTINUED | OUTPATIENT
Start: 2022-01-25 | End: 2022-01-26 | Stop reason: HOSPADM

## 2022-01-25 RX ADMIN — SPIRONOLACTONE 12.5 MG: 25 TABLET, FILM COATED ORAL at 10:01

## 2022-01-25 RX ADMIN — ASPIRIN 81 MG: 81 TABLET, DELAYED RELEASE ORAL at 08:01

## 2022-01-25 RX ADMIN — FUROSEMIDE 20 MG: 20 TABLET ORAL at 08:01

## 2022-01-25 RX ADMIN — PRASUGREL 10 MG: 10 TABLET, FILM COATED ORAL at 08:01

## 2022-01-25 RX ADMIN — PANTOPRAZOLE SODIUM 40 MG: 40 TABLET, DELAYED RELEASE ORAL at 08:01

## 2022-01-25 RX ADMIN — MUPIROCIN: 20 OINTMENT TOPICAL at 08:01

## 2022-01-25 RX ADMIN — MUPIROCIN: 20 OINTMENT TOPICAL at 09:01

## 2022-01-25 RX ADMIN — EZETIMIBE 10 MG: 10 TABLET ORAL at 08:01

## 2022-01-25 RX ADMIN — IPRATROPIUM BROMIDE AND ALBUTEROL SULFATE 3 ML: .5; 3 SOLUTION RESPIRATORY (INHALATION) at 06:01

## 2022-01-25 RX ADMIN — CARVEDILOL 12.5 MG: 12.5 TABLET, FILM COATED ORAL at 08:01

## 2022-01-25 RX ADMIN — VALSARTAN 40 MG: 40 TABLET, FILM COATED ORAL at 02:01

## 2022-01-25 RX ADMIN — FLUTICASONE PROPIONATE 50 MCG: 50 SPRAY, METERED NASAL at 08:01

## 2022-01-25 RX ADMIN — PRAVASTATIN SODIUM 40 MG: 20 TABLET ORAL at 08:01

## 2022-01-25 NOTE — PLAN OF CARE
Plan of care reviewed with patient. Patient voiced understanding. Cath lab intervention today. 2 stents placed. Puncture site R radial. DR band deflated at bedside. No bleeding noted. Pt educated on the immobilizing arm to prevent bleeding. Pt asked to place splint on R wrist site to help with immobilization. Tegaderm placed over puncture site. Small serosanguinous drainage noted at site at the end of shift. No hematoma, swelling, or redness noted. NSR on monitor. No acute distress noted. Side rails x2, bed in lowest position, call bell within reach, pt advised to call for assistance. Maintain bed alarm for patient safety. Will continue to monitor.

## 2022-01-25 NOTE — PLAN OF CARE
Problem: Physical Therapy Goal  Goal: Physical Therapy Goal  Description: Goals to be met by: 22    Patient will increase functional independence with mobility by performin. Gait x 300 feet with supervision with no AD or LRAD.  2. Ascend/descend 4 step(s) with least restrictive assistive device and uni HR with supervision.   3. Standing activity without onset of SOB or CP x5 min.  Outcome: Ongoing, Progressing     Patient evaluated by PT and goals established. Patient performed OOB mobility well with SBA and no AD, occasional LOB with intact stepping responses to prevent fall. Pt reports slight SOB during ambulation without change in vitals. PT will continue to follow and progress as tolerated. Rec for dc to home with OP cardiac rehab. Please see progress note for detailed plan of care and recommendations.
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
## 2022-01-25 NOTE — PT/OT/SLP EVAL
Physical Therapy Evaluation and Treatment    Patient Name:  Odette Hart   MRN:  [id number]    Recommendations:     Discharge Recommendations:  cardiac rehab   Discharge Equipment Recommendations:  (possible rollator)   Barriers to discharge: Inaccessible home and Decreased caregiver support    Assessment:     Odette Hart is a 66 y.o. female admitted with a medical diagnosis of Non-ST elevation myocardial infarction (NSTEMI). Pmhx significant for MI with AICD and cardiomyopathy with EF 20%.  She presents with the following impairments/functional limitations:  impaired endurance,gait instability,impaired balance,decreased safety awareness,impaired cardiopulmonary response to activity.    Patient evaluated by PT and goals established. Patient performed OOB mobility well with SBA and no AD, occasional LOB with intact stepping responses to prevent fall. Pt reports slight SOB during ambulation without change in vitals. PT will continue to follow and progress as tolerated. Rec for dc to home with OP cardiac rehab.    Rehab Prognosis: Good; patient would benefit from acute skilled PT services to address these deficits and reach maximum level of function.    Recent Surgery: Procedure(s) (LRB):  CATHETERIZATION, HEART, LEFT (Left) 1 Day Post-Op    Plan:     During this hospitalization, patient to be seen 3 x/week to address the identified rehab impairments via gait training,therapeutic activities,therapeutic exercises and progress toward the following goals:    · Plan of Care Expires:  02/08/22    Subjective     Chief Complaint: Slight SOB especially when performing bed mobility  Patient/Family Comments/goals: Goal to return home and to not have to use AD; Patient agreeable to evaluation.  Pain/Comfort:  · Pain Rating 1: 0/10  · Pain Rating Post-Intervention 1: 0/10    Patients cultural, spiritual, Sikhism conflicts given the current situation: no    Living Environment:  · Pt lives alone in a mobile home with 4  steps to enter and uni handrail(s).   · Upon discharge, patient will have assistance from family PRN.  Prior level of function:  · Ambulation: Indep  · ADL's: Indep  · IADLs: indep  · Used to be more active pre-pandemic  · Falls: Denies  ·  Equipment used at home: none.    Objective:     Communicated with JUAN Gonzales prior to session.  Patient found up in chair with peripheral IV,telemetry,pulse ox (continuous),blood pressure cuff  upon PT entry to room.    General Precautions: Standard, fall (cardiac)   Orthopedic Precautions:N/A   Braces: N/A  Respiratory Status: Room air    Patient donned yellow socks and gait belt for OOB mobility. Patient donned mask for hallway ambulation.    Exams:  · Cognition:   · Patient is oriented x4.  · Pt follows approximately 100% of one and two step commands.    · Mood: Pleasant and cooperative.   · Safety Awareness: Good  · Musculoskeletal:  · BMI: 23.38  · Posture:  Forward head, rounded shoulders  · LE ROM/Strength:   · R ROM: WFL  · L ROM: WFL  · R Strength: WFL  · L Strength: WFL  · Neuromuscular:  · Sensation: Intact to light touch bilateral LEs.  · Coordination/Tone/Reflexes: No impairments identified with functional mobility. No formal testing performed.   · Balance:   · Static sitting: Good  · Static standing: Good  · Dynamic standing: Good  · Visual-vestibular: No impairments identified with functional mobility. No formal testing performed.  · Integument: Visible skin intact  · Cardiopulmonary:  · O2 Requirements: RA  · Vital signs: BP remained 100-110/50-60, HR maintained less than 90 bpm, SpO2 >90%  · Edema: None noted    Functional Mobility:  · Bed Mobility:     · Sit to Supine: contact guard assistance  · Transfers:     · Sit to Stand:  independence and supervision with no AD  · Gait: x260 ft with no AD and SBA-CGA.   · x4 lateral LOB with intact stepping response with PT with CGA to ensure safety from falls, pt reports d/t lack of glasses.   · Improved after initial half of  gait bout.   · Vitals remained stable.   · Pt denied SOB but increased RR/WOB noted.     Therapeutic Activities and Exercises:  ·  TA:  · Instructed in logroll to prevent excessive exertion with bed mobility  · Vitals monitored during ambulation bout  PT educated patient re:   PT plan of care/role of PT  Safety with OOB mobility  Use of rollator for possible community mobility upon dc  Pt verbalized understanding       AM-PAC 6 CLICK MOBILITY  Total Score:22     Patient left HOB elevated with all lines intact, call button in reach and RN notified.    GOALS:   Multidisciplinary Problems     Physical Therapy Goals        Problem: Physical Therapy Goal    Goal Priority Disciplines Outcome Goal Variances Interventions   Physical Therapy Goal     PT, PT/OT Ongoing, Progressing     Description: Goals to be met by: 22    Patient will increase functional independence with mobility by performin. Gait x 300 feet with supervision with no AD or LRAD.  2. Ascend/descend 4 step(s) with least restrictive assistive device and uni HR with supervision.   3. Standing activity without onset of SOB or CP x5 min.                   History:     Past Medical History:   Diagnosis Date    Acute coronary syndrome     Allergy     Arthritis     Cardiomyopathy     CHF (congestive heart failure)     Coronary artery disease     Diverticulitis     Diverticulosis     Former smoker 2022    Heart attack     Heart disease     History of myocardial infarction 2022    Hyperlipidemia     Hypertension     Hypertension 2022    ICD (implantable cardioverter-defibrillator) in place        Past Surgical History:   Procedure Laterality Date    ATRIAL CARDIAC PACEMAKER INSERTION       SECTION      CORONARY STENT PLACEMENT         Time Tracking:     PT Received On: 22  PT Start Time: 1355     PT Stop Time: 1414  PT Total Time (min): 19 min     Billable Minutes: Evaluation 10 and Therapeutic Activity  9      01/25/2022

## 2022-01-25 NOTE — PLAN OF CARE
[facility] - Intensive Care ([place])  Initial Discharge Assessment       Primary Care Provider: Braxton Barragan MD    Admission Diagnosis: NSTEMI (non-ST elevated myocardial infarction) [I21.4]    Admission Date: 1/22/2022  Expected Discharge Date:      Discharge Barriers Identified: None    Payor: [employer]ARE / Plan: [employer] CHOICES 65 / Product Type: Medicare Advantage /     Extended Emergency Contact Information  Primary Emergency Contact: Sheila Ortiz  Address: [address]           [place] [address] [facility]  Home Phone: [phone]  Mobile Phone: [phone]  Relation: Son  Preferred language: English   needed? No    Discharge Plan A: Home  Discharge Plan B: Home      [facility] [address] [place], [place] - [address]  [address]  [place] [address]  Phone: [phone] Fax: [phone]      Initial Assessment (most recent)       Adult Discharge Assessment - 01/24/22 1649          Discharge Assessment    Assessment Type Discharge Planning Assessment     Confirmed/corrected address, phone number and insurance Yes     Confirmed Demographics Correct on Facesheet     Source of Information patient;family     Communicated SIVAN with patient/caregiver Date not available/Unable to determine     Reason For Admission transferred here for cardiology     Lives With alone     Facility Arrived From: [facility]     Do you expect to return to your current living situation? Yes     Do you have help at home or someone to help you manage your care at home? Yes     Who are your caregiver(s) and their phone number(s)? sheila hanna, [phone]     Prior to hospitilization cognitive status: Alert/Oriented;No Deficits     Current cognitive status: Alert/Oriented;No Deficits     Walking or Climbing Stairs Difficulty none     Dressing/Bathing Difficulty none     Equipment Currently Used at Home none     Readmission within 30  days? No     Patient currently being followed by outpatient case management? No     Do you currently have service(s) that help you manage your care at home? No     Do you take prescription medications? Yes     Do you have prescription coverage? Yes     Coverage [employer]     Do you have any problems affording any of your prescribed medications? No     Is the patient taking medications as prescribed? yes     Who is going to help you get home at discharge? family     How do you get to doctors appointments? car, drives self     Are you on dialysis? No     Do you take coumadin? No     Discharge Plan A Home     Discharge Plan B Home     DME Needed Upon Discharge  none     Discharge Plan discussed with: Adult children;Patient     Discharge Barriers Identified None                     Readmission Assessment (most recent)       Readmission Assessment - 01/25/22 1430          Readmission    Why were you hospitalized in the last 30 days? [place] pt transferred here to [facility];     When you left the hospital where did you go? --   transferred to [facility]

## 2022-01-25 NOTE — PLAN OF CARE
Pt was transferred to [facility] from [facility], she did not discharge home between hospitals.   01/25/22 1430   Readmission   Why were you hospitalized in the last 30 days? [place] pt transferred here to [facility];   When you left the hospital where did you go?   (transferred to [facility])      Detail Level: Zone

## 2022-01-26 ENCOUNTER — PATIENT MESSAGE (OUTPATIENT)
Dept: CARDIOLOGY | Facility: CLINIC | Age: 67
End: 2022-01-26
Payer: MEDICARE

## 2022-01-26 ENCOUNTER — TELEPHONE (OUTPATIENT)
Dept: CARDIOLOGY | Facility: CLINIC | Age: 67
End: 2022-01-26
Payer: MEDICARE

## 2022-01-26 ENCOUNTER — PATIENT MESSAGE (OUTPATIENT)
Dept: PRIMARY CARE CLINIC | Facility: CLINIC | Age: 67
End: 2022-01-26
Payer: MEDICARE

## 2022-01-26 ENCOUNTER — TELEPHONE (OUTPATIENT)
Dept: PRIMARY CARE CLINIC | Facility: CLINIC | Age: 67
End: 2022-01-26

## 2022-01-26 VITALS
WEIGHT: 132 LBS | BODY MASS INDEX: 23.39 KG/M2 | HEART RATE: 64 BPM | RESPIRATION RATE: 18 BRPM | SYSTOLIC BLOOD PRESSURE: 103 MMHG | DIASTOLIC BLOOD PRESSURE: 57 MMHG | TEMPERATURE: 98 F | HEIGHT: 63 IN | OXYGEN SATURATION: 98 %

## 2022-01-26 DIAGNOSIS — I25.5 ISCHEMIC CARDIOMYOPATHY: Primary | ICD-10-CM

## 2022-01-26 LAB — POCT GLUCOSE: 89 MG/DL (ref 70–110)

## 2022-01-26 PROCEDURE — 25000003 PHARM REV CODE 250: Performed by: FAMILY MEDICINE

## 2022-01-26 PROCEDURE — 99233 SBSQ HOSP IP/OBS HIGH 50: CPT | Mod: ,,, | Performed by: INTERNAL MEDICINE

## 2022-01-26 PROCEDURE — 25000003 PHARM REV CODE 250: Performed by: INTERNAL MEDICINE

## 2022-01-26 PROCEDURE — 94761 N-INVAS EAR/PLS OXIMETRY MLT: CPT

## 2022-01-26 PROCEDURE — 99233 PR SUBSEQUENT HOSPITAL CARE,LEVL III: ICD-10-PCS | Mod: ,,, | Performed by: INTERNAL MEDICINE

## 2022-01-26 RX ORDER — FUROSEMIDE 20 MG/1
20 TABLET ORAL DAILY
Qty: 90 TABLET | Refills: 0 | Status: SHIPPED | OUTPATIENT
Start: 2022-01-27 | End: 2022-02-02

## 2022-01-26 RX ORDER — ASPIRIN 81 MG/1
81 TABLET ORAL DAILY
Qty: 90 TABLET | Refills: 0 | Status: SHIPPED | OUTPATIENT
Start: 2022-01-26 | End: 2022-01-01 | Stop reason: SDUPTHER

## 2022-01-26 RX ORDER — SPIRONOLACTONE 25 MG/1
12.5 TABLET ORAL DAILY
Qty: 45 TABLET | Refills: 0 | Status: SHIPPED | OUTPATIENT
Start: 2022-01-27 | End: 2022-02-02 | Stop reason: DRUGHIGH

## 2022-01-26 RX ORDER — ACETAMINOPHEN 500 MG
1000 TABLET ORAL EVERY 8 HOURS PRN
Refills: 0 | COMMUNITY
Start: 2022-01-26

## 2022-01-26 RX ORDER — CARVEDILOL 6.25 MG/1
6.25 TABLET ORAL 2 TIMES DAILY
Qty: 180 TABLET | Refills: 0 | Status: SHIPPED | OUTPATIENT
Start: 2022-01-26 | End: 2022-02-02 | Stop reason: DRUGHIGH

## 2022-01-26 RX ORDER — VALSARTAN 40 MG/1
40 TABLET ORAL DAILY
Qty: 90 TABLET | Refills: 0 | Status: SHIPPED | OUTPATIENT
Start: 2022-01-27 | End: 2022-02-02

## 2022-01-26 RX ORDER — NITROGLYCERIN 0.4 MG/1
0.4 TABLET SUBLINGUAL EVERY 5 MIN PRN
Qty: 25 TABLET | Refills: 0 | Status: SHIPPED | OUTPATIENT
Start: 2022-01-26 | End: 2022-01-01 | Stop reason: SDUPTHER

## 2022-01-26 RX ORDER — PRASUGREL 10 MG/1
10 TABLET, FILM COATED ORAL DAILY
Qty: 90 TABLET | Refills: 0 | Status: SHIPPED | OUTPATIENT
Start: 2022-01-27 | End: 2022-01-01

## 2022-01-26 RX ORDER — PRAVASTATIN SODIUM 40 MG/1
40 TABLET ORAL DAILY
Qty: 90 TABLET | Refills: 0 | Status: SHIPPED | OUTPATIENT
Start: 2022-01-27 | End: 2022-02-02

## 2022-01-26 RX ORDER — CARVEDILOL 6.25 MG/1
6.25 TABLET ORAL 2 TIMES DAILY
Status: DISCONTINUED | OUTPATIENT
Start: 2022-01-26 | End: 2022-01-26 | Stop reason: HOSPADM

## 2022-01-26 RX ADMIN — PRASUGREL 10 MG: 10 TABLET, FILM COATED ORAL at 08:01

## 2022-01-26 RX ADMIN — FUROSEMIDE 20 MG: 20 TABLET ORAL at 08:01

## 2022-01-26 RX ADMIN — PRAVASTATIN SODIUM 40 MG: 20 TABLET ORAL at 08:01

## 2022-01-26 RX ADMIN — VALSARTAN 40 MG: 40 TABLET, FILM COATED ORAL at 08:01

## 2022-01-26 RX ADMIN — PANTOPRAZOLE SODIUM 40 MG: 40 TABLET, DELAYED RELEASE ORAL at 08:01

## 2022-01-26 RX ADMIN — SPIRONOLACTONE 12.5 MG: 25 TABLET, FILM COATED ORAL at 08:01

## 2022-01-26 RX ADMIN — ASPIRIN 81 MG: 81 TABLET, DELAYED RELEASE ORAL at 08:01

## 2022-01-26 RX ADMIN — EZETIMIBE 10 MG: 10 TABLET ORAL at 08:01

## 2022-01-26 RX ADMIN — MUPIROCIN: 20 OINTMENT TOPICAL at 08:01

## 2022-01-26 NOTE — TELEPHONE ENCOUNTER
Spoke with patient.    Advised her that she needs to check with Dr Barragan or Dr Cordova regarding home health.  Patient verbalized understanding.    ----- Message from Sophy Morales sent at 1/26/2022  2:07 PM CST -----  Regarding: Patient Advice  Contact: Pt [phone]  Patient is calling Dr. Barragan's  and Dr. Arenas's office in regards to obtaining a order for [facility]. Please call. [phone]

## 2022-01-26 NOTE — PLAN OF CARE
[facility] - Intensive Care ([place])  Discharge Final Note    Primary Care Provider: Braxton Barragan MD    Expected Discharge Date: 1/26/2022    Final Discharge Note (most recent)       Final Note - 01/26/22 0939          Final Note    Assessment Type Final Discharge Note     Anticipated Discharge Disposition Home or Self Care     What phone number can be called within the next 1-3 days to see how you are doing after discharge? [phone]     Hospital Resources/Appts/Education Provided Appointments scheduled by Navigator/Coordinator;Provided patient/caregiver with written discharge plan information        Post-Acute Status    Discharge Delays None known at this time (P)                    Son to provide transportation home.  Apt scheduled with Dr Arenas for hospital follow-up.  Per pt request, CM called [employer] to arrange home meal delivery.    Pt ready for discharge after meds delivered from [facility] retail pharmacy.    Pt is in agreement with plan.  Important Message from Medicare

## 2022-01-26 NOTE — NURSING
Discharge orders reviewed with patient, including new medications (indications, frequency, side effects, etc), follow up care, s/s to report to PCP, when to seek medical attention. Pt verbalized understanding, able to teach-back d/c instructions. All prescriptions but prasugrel filled and delivered to patient. Pt instructed to  prasugrel Rx from home pharmacy. Pt escorted to [facility] via wheelchair, pt ambulatory to [facility] with steady gait. All belongings sent home with pt. Son to receive patient at home.

## 2022-01-26 NOTE — PROGRESS NOTES
"[facility] - Intensive Care [facility] Medicine  Progress Note    Patient Name: Odette Hart  MRN: [id number]  Patient Class: IP- Inpatient   Admission Date: 1/22/2022  Length of Stay: 3 days  Attending Physician: Nghia Hunt MD  Primary Care Provider: Braxton Barragan MD        Subjective:     Principal Problem:Non-ST elevation myocardial infarction (NSTEMI)        HPI:  Per Martha Hameed, PA-C:    "Ms. Odette Hart is a 66 y.o. female, with PMH of CAD, prior MI (2012) s/p LAD stenting, ischemic cardiomyopathy s/p AICD placement in 2014, HFrEF (EF 20%) , HTN, HLD, RA, who presented to [facility] in transfer from [facility] ICU due to chest pain. She states she has awoken in the night, with chest pain (often relieved by drinking water), which she attributed to heart burn. At 0222 on 1/21/22 she awoke with this same burning sensation and was unable to get relief, this time she notes it feels dull and heavy and locates it to the sternal region and denies radiation. There is not dyspnea or shortness of breath associated with her chest pain. She then began to experience nausea and vomiting, and shortness of breath that afternoon. She called EMS, and was taken to the ED in [place]. She states that while there, her pain resolved. An EKG showed no acute ST changes. Transfer was arranged to [facility] due to lack of bed availability at another [facility] facility, or [facility] where there are cardiac catheterization capabilities on weekends. She was started on a high intensity heparin drip, ASA, and her home statin were contined. She is admitted to inpatient status."      Overview/Hospital Course:  Patient presented to [place] with chest pain that started while sleeping.  Trops elevated to a peak of 8.992.  EKG with changes in inferior leads which are new.  Patient was started on IV Heparin and NTG patch and transferred to [facility] for Cardiology intervention.  Patient s/p [facility] this morning:  LAD: Proximal stent " patent. LCX: Proximal 80%. LCX: Dominant. Moderate disease. LCX: HEVER 2.75 x 18 mm. Distal dissection. HEVER 2.75 x 22 mm.  IV Heparin stopped on 1/24/2021.  Prasugrel started by Cardiology.          Interval History:  No acute events.  Mild shortness of breath with ambulation.    Review of Systems   Constitutional: Negative for chills and fever.   Respiratory: Positive for shortness of breath.    Cardiovascular: Negative for chest pain.   Gastrointestinal: Negative for abdominal pain, nausea and vomiting.   Genitourinary: Negative for dysuria and frequency.     Objective:     Vital Signs (Most Recent):  Temp: 98.7 °F (37.1 °C) (01/25/22 2001)  Pulse: 64 (01/25/22 2001)  Resp: (!) 28 (01/25/22 2001)  BP: (!) 92/55 (01/25/22 2001)  SpO2: 97 % (01/25/22 2001) Vital Signs (24h Range):  Temp:  [98.4 °F (36.9 °C)-99.1 °F (37.3 °C)] 98.7 °F (37.1 °C)  Pulse:  [60-81] 64  Resp:  [16-35] 28  SpO2:  [90 %-97 %] 97 %  BP: ()/(48-58) 92/55     Weight: 59.9 kg (132 lb)  Body mass index is 23.38 kg/m².    Intake/Output Summary (Last 24 hours) at 1/25/2022 2212  Last data filed at 1/25/2022 2100  Gross per 24 hour   Intake 1260 ml   Output 1380 ml   Net -120 ml      Physical Exam  Constitutional:       General: She is not in acute distress.  Eyes:      Conjunctiva/sclera: Conjunctivae normal.   Cardiovascular:      Rate and Rhythm: Normal rate and regular rhythm.   Pulmonary:      Effort: Pulmonary effort is normal. No respiratory distress.      Breath sounds: Rales present. No wheezing.      Comments: Mild crackles at lung base  Abdominal:      General: Bowel sounds are normal.      Palpations: Abdomen is soft.      Tenderness: There is no abdominal tenderness.   Musculoskeletal:         General: No swelling or tenderness.      Cervical back: Neck supple.   Neurological:      General: No focal deficit present.      Mental Status: She is oriented to person, place, and time.      Motor: No weakness.         Significant Labs:  All pertinent labs within the past 24 hours have been reviewed.    Significant Imaging: I have reviewed all pertinent imaging results/findings within the past 24 hours.      Assessment/Plan:      * Non-ST elevation myocardial infarction (NSTEMI)  Patient presented to [place] with chest pain that started while sleeping.  Trops elevated to a peak of 8.992.  EKG with changes in inferior leads which are new.  Patient was started on IV Heparin and NTG patch and transferred to [facility] for Cardiology intervention.  Patient s/p [facility] this morning:  LAD: Proximal stent patent. LCX: Proximal 80%. LCX: Dominant. Moderate disease. LCX: HEVER 2.75 x 18 mm. Distal dissection. HEVER 2.75 x 22 mm.  Continue with medical therapy. Stable to transfer to the floor.    HLD (hyperlipidemia)  -Continue Statin      Ischemic cardiomyopathy  Mild volume overload.  Continue medical therapy including diuretic.      VTE Risk Mitigation (From admission, onward)         Ordered     IP VTE HIGH RISK PATIENT  Once         01/22/22 1907     Reason for no Mechanical VTE Prophylaxis  Once        Question:  Reasons:  Answer:  IV Heparin within 24 hrs. Pre or Post-Op    01/22/22 1907                Nghia Hunt MD  Department of Hospital Medicine   [facility] - Intensive Care ([place])

## 2022-01-26 NOTE — TELEPHONE ENCOUNTER
----- Message from Sophy Morales sent at 1/26/2022  2:07 PM CST -----  Regarding: Patient Advice  Contact: Pt [phone]  Patient is calling Dr. Barragan's  and Dr. Arenas's office in regards to obtaining a order for [facility]. Please call. [phone]
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
## 2022-01-26 NOTE — SUBJECTIVE & OBJECTIVE
Interval History:  No acute events.  Mild shortness of breath with ambulation.    Review of Systems   Constitutional: Negative for chills and fever.   Respiratory: Positive for shortness of breath.    Cardiovascular: Negative for chest pain.   Gastrointestinal: Negative for abdominal pain, nausea and vomiting.   Genitourinary: Negative for dysuria and frequency.     Objective:     Vital Signs (Most Recent):  Temp: 98.7 °F (37.1 °C) (01/25/22 2001)  Pulse: 64 (01/25/22 2001)  Resp: (!) 28 (01/25/22 2001)  BP: (!) 92/55 (01/25/22 2001)  SpO2: 97 % (01/25/22 2001) Vital Signs (24h Range):  Temp:  [98.4 °F (36.9 °C)-99.1 °F (37.3 °C)] 98.7 °F (37.1 °C)  Pulse:  [60-81] 64  Resp:  [16-35] 28  SpO2:  [90 %-97 %] 97 %  BP: ()/(48-58) 92/55     Weight: 59.9 kg (132 lb)  Body mass index is 23.38 kg/m².    Intake/Output Summary (Last 24 hours) at 1/25/2022 2212  Last data filed at 1/25/2022 2100  Gross per 24 hour   Intake 1260 ml   Output 1380 ml   Net -120 ml      Physical Exam  Constitutional:       General: She is not in acute distress.  Eyes:      Conjunctiva/sclera: Conjunctivae normal.   Cardiovascular:      Rate and Rhythm: Normal rate and regular rhythm.   Pulmonary:      Effort: Pulmonary effort is normal. No respiratory distress.      Breath sounds: Rales present. No wheezing.      Comments: Mild crackles at lung base  Abdominal:      General: Bowel sounds are normal.      Palpations: Abdomen is soft.      Tenderness: There is no abdominal tenderness.   Musculoskeletal:         General: No swelling or tenderness.      Cervical back: Neck supple.   Neurological:      General: No focal deficit present.      Mental Status: She is oriented to person, place, and time.      Motor: No weakness.         Significant Labs: All pertinent labs within the past 24 hours have been reviewed.    Significant Imaging: I have reviewed all pertinent imaging results/findings within the past 24 hours.

## 2022-01-31 ENCOUNTER — TELEPHONE (OUTPATIENT)
Dept: PRIMARY CARE CLINIC | Facility: CLINIC | Age: 67
End: 2022-01-31
Payer: MEDICARE

## 2022-01-31 ENCOUNTER — PATIENT OUTREACH (OUTPATIENT)
Dept: ADMINISTRATIVE | Facility: OTHER | Age: 67
End: 2022-01-31
Payer: MEDICARE

## 2022-01-31 NOTE — TELEPHONE ENCOUNTER
----- Message from Martha Cruz sent at 1/31/2022  8:23 AM CST -----  Contact: [facility] [phone] ext. [phone]  Requesting recent notes/orders for Home Health.    Please call and advise.    Thank You

## 2022-01-31 NOTE — TELEPHONE ENCOUNTER
----- Message from Je Soto sent at 1/31/2022  9:43 AM CST -----  Contact: [phone]  [employer] calling to get a PA started for home health for this patient  Case number is [id number].Please advise

## 2022-01-31 NOTE — TELEPHONE ENCOUNTER
Spoke with [facility] representative - pt. Had called around 9:30 am they received the clinical notes at 9:47 am we should be getting a letter of approval shortly

## 2022-01-31 NOTE — PROGRESS NOTES
LINKS immunization registry updated  Care Everywhere updated  Health Maintenance updated  DIS/Chart reviewed for overdue Proactive [facility] Encounters (CLAY) health maintenance testing (CRS, Breast Ca, Diabetic Eye Exam)   Orders entered:N/A

## 2022-02-01 ENCOUNTER — TELEPHONE (OUTPATIENT)
Dept: PRIMARY CARE CLINIC | Facility: CLINIC | Age: 67
End: 2022-02-01
Payer: MEDICARE

## 2022-02-01 NOTE — TELEPHONE ENCOUNTER
Pt. Already has a ref. To Rheumatology in the system from Nov due to a positive [place] - message sent to scheduling.

## 2022-02-01 NOTE — TELEPHONE ENCOUNTER
----- Message from Cristina Candelario sent at 2/1/2022 11:44 AM CST -----  Contact: [phone] Odette  Patient would like to receive a call back in regards to an appointment with an Rheumatoid Arthritis doctor. Please look at patient's blood work. Please call and advise.

## 2022-02-02 ENCOUNTER — OFFICE VISIT (OUTPATIENT)
Dept: CARDIOLOGY | Facility: CLINIC | Age: 67
End: 2022-02-02
Payer: MEDICARE

## 2022-02-02 VITALS
HEART RATE: 66 BPM | DIASTOLIC BLOOD PRESSURE: 58 MMHG | BODY MASS INDEX: 22.58 KG/M2 | HEIGHT: 63 IN | WEIGHT: 127.44 LBS | OXYGEN SATURATION: 100 % | SYSTOLIC BLOOD PRESSURE: 124 MMHG

## 2022-02-02 DIAGNOSIS — E78.01 FAMILIAL HYPERCHOLESTEROLEMIA: ICD-10-CM

## 2022-02-02 DIAGNOSIS — Z95.5 HISTORY OF CORONARY ARTERY STENT PLACEMENT: ICD-10-CM

## 2022-02-02 DIAGNOSIS — I21.4 NON-ST ELEVATION MYOCARDIAL INFARCTION (NSTEMI): Primary | ICD-10-CM

## 2022-02-02 DIAGNOSIS — M05.80 POLYARTHRITIS WITH POSITIVE RHEUMATOID FACTOR: ICD-10-CM

## 2022-02-02 DIAGNOSIS — Z87.891 FORMER SMOKER: ICD-10-CM

## 2022-02-02 DIAGNOSIS — I25.2 HISTORY OF MYOCARDIAL INFARCTION: ICD-10-CM

## 2022-02-02 DIAGNOSIS — I10 PRIMARY HYPERTENSION: ICD-10-CM

## 2022-02-02 DIAGNOSIS — I45.2 RBBB WITH LEFT ANTERIOR FASCICULAR BLOCK: ICD-10-CM

## 2022-02-02 DIAGNOSIS — E78.2 MIXED HYPERLIPIDEMIA: ICD-10-CM

## 2022-02-02 DIAGNOSIS — I25.5 ISCHEMIC CARDIOMYOPATHY: ICD-10-CM

## 2022-02-02 DIAGNOSIS — Z95.810 AUTOMATIC IMPLANTABLE CARDIOVERTER-DEFIBRILLATOR IN SITU: ICD-10-CM

## 2022-02-02 PROCEDURE — 3008F BODY MASS INDEX DOCD: CPT | Mod: CPTII,S$GLB,, | Performed by: INTERNAL MEDICINE

## 2022-02-02 PROCEDURE — 1111F DSCHRG MED/CURRENT MED MERGE: CPT | Mod: CPTII,S$GLB,, | Performed by: INTERNAL MEDICINE

## 2022-02-02 PROCEDURE — 1160F PR REVIEW ALL MEDS BY PRESCRIBER/CLIN PHARMACIST DOCUMENTED: ICD-10-PCS | Mod: CPTII,S$GLB,, | Performed by: INTERNAL MEDICINE

## 2022-02-02 PROCEDURE — 99214 PR OFFICE/OUTPT VISIT, EST, LEVL IV, 30-39 MIN: ICD-10-PCS | Mod: S$GLB,,, | Performed by: INTERNAL MEDICINE

## 2022-02-02 PROCEDURE — 1111F PR DISCHARGE MEDS RECONCILED W/ CURRENT OUTPATIENT MED LIST: ICD-10-PCS | Mod: CPTII,S$GLB,, | Performed by: INTERNAL MEDICINE

## 2022-02-02 PROCEDURE — 99499 UNLISTED E&M SERVICE: CPT | Mod: S$GLB,,, | Performed by: INTERNAL MEDICINE

## 2022-02-02 PROCEDURE — 99999 PR PBB SHADOW E&M-EST. PATIENT-LVL III: ICD-10-PCS | Mod: PBBFAC,,, | Performed by: INTERNAL MEDICINE

## 2022-02-02 PROCEDURE — 1159F MED LIST DOCD IN RCRD: CPT | Mod: CPTII,S$GLB,, | Performed by: INTERNAL MEDICINE

## 2022-02-02 PROCEDURE — 1126F PR PAIN SEVERITY QUANTIFIED, NO PAIN PRESENT: ICD-10-PCS | Mod: CPTII,S$GLB,, | Performed by: INTERNAL MEDICINE

## 2022-02-02 PROCEDURE — 99999 PR PBB SHADOW E&M-EST. PATIENT-LVL III: CPT | Mod: PBBFAC,,, | Performed by: INTERNAL MEDICINE

## 2022-02-02 PROCEDURE — 3074F SYST BP LT 130 MM HG: CPT | Mod: CPTII,S$GLB,, | Performed by: INTERNAL MEDICINE

## 2022-02-02 PROCEDURE — 3078F DIAST BP <80 MM HG: CPT | Mod: CPTII,S$GLB,, | Performed by: INTERNAL MEDICINE

## 2022-02-02 PROCEDURE — 4010F ACE/ARB THERAPY RXD/TAKEN: CPT | Mod: CPTII,S$GLB,, | Performed by: INTERNAL MEDICINE

## 2022-02-02 PROCEDURE — 3288F PR FALLS RISK ASSESSMENT DOCUMENTED: ICD-10-PCS | Mod: CPTII,S$GLB,, | Performed by: INTERNAL MEDICINE

## 2022-02-02 PROCEDURE — 1101F PR PT FALLS ASSESS DOC 0-1 FALLS W/OUT INJ PAST YR: ICD-10-PCS | Mod: CPTII,S$GLB,, | Performed by: INTERNAL MEDICINE

## 2022-02-02 PROCEDURE — 3288F FALL RISK ASSESSMENT DOCD: CPT | Mod: CPTII,S$GLB,, | Performed by: INTERNAL MEDICINE

## 2022-02-02 PROCEDURE — 1126F AMNT PAIN NOTED NONE PRSNT: CPT | Mod: CPTII,S$GLB,, | Performed by: INTERNAL MEDICINE

## 2022-02-02 PROCEDURE — 3008F PR BODY MASS INDEX (BMI) DOCUMENTED: ICD-10-PCS | Mod: CPTII,S$GLB,, | Performed by: INTERNAL MEDICINE

## 2022-02-02 PROCEDURE — 99499 RISK ADDL DX/OHS AUDIT: ICD-10-PCS | Mod: S$GLB,,, | Performed by: INTERNAL MEDICINE

## 2022-02-02 PROCEDURE — 1160F RVW MEDS BY RX/DR IN RCRD: CPT | Mod: CPTII,S$GLB,, | Performed by: INTERNAL MEDICINE

## 2022-02-02 PROCEDURE — 99214 OFFICE O/P EST MOD 30 MIN: CPT | Mod: S$GLB,,, | Performed by: INTERNAL MEDICINE

## 2022-02-02 PROCEDURE — 3074F PR MOST RECENT SYSTOLIC BLOOD PRESSURE < 130 MM HG: ICD-10-PCS | Mod: CPTII,S$GLB,, | Performed by: INTERNAL MEDICINE

## 2022-02-02 PROCEDURE — 1159F PR MEDICATION LIST DOCUMENTED IN MEDICAL RECORD: ICD-10-PCS | Mod: CPTII,S$GLB,, | Performed by: INTERNAL MEDICINE

## 2022-02-02 PROCEDURE — 4010F PR ACE/ARB THEARPY RXD/TAKEN: ICD-10-PCS | Mod: CPTII,S$GLB,, | Performed by: INTERNAL MEDICINE

## 2022-02-02 PROCEDURE — 3078F PR MOST RECENT DIASTOLIC BLOOD PRESSURE < 80 MM HG: ICD-10-PCS | Mod: CPTII,S$GLB,, | Performed by: INTERNAL MEDICINE

## 2022-02-02 PROCEDURE — 1101F PT FALLS ASSESS-DOCD LE1/YR: CPT | Mod: CPTII,S$GLB,, | Performed by: INTERNAL MEDICINE

## 2022-02-02 RX ORDER — SPIRONOLACTONE 25 MG/1
25 TABLET ORAL DAILY
Qty: 90 TABLET | Refills: 3 | Status: ON HOLD | OUTPATIENT
Start: 2022-02-02 | End: 2022-01-01 | Stop reason: HOSPADM

## 2022-02-02 RX ORDER — CARVEDILOL 12.5 MG/1
12.5 TABLET ORAL 2 TIMES DAILY WITH MEALS
Qty: 180 TABLET | Refills: 3 | Status: ON HOLD | OUTPATIENT
Start: 2022-02-02 | End: 2022-01-01 | Stop reason: HOSPADM

## 2022-02-02 NOTE — PROGRESS NOTES
Subjective:      Patient ID: Odette Hart is a 66 y.o. female.    Chief Complaint: Coronary Artery Disease and Hospital Follow Up    HPI:  Pt was hospitalized 22 with chest pain.  Dx with NSTEMI.    Dr Cordova put 2 stents in the circumflex for 80 and 90% stenoses/    Valsartan and prasugrel and pravastatin and furosemide and spironolactone were added to pt's regimen    Pt has a hx of myositis on atorvastatin.    Pt has a hx of chest pains on pravastatin when she tried it in the past.    Pt did not take lasix yesterday or today      The carvedilol was reduced      Valsartan was added but pt did not take since her blood pressure was low    Pt went back to prior higher dose of carvedilol and feels better today.      Pt felt short of breath in Dr Barragan's office yesterday and was sent to ER and then sent home.    Pt had been having heartburn prior to heart attack    Review of Systems   Cardiovascular: Positive for chest pain and dyspnea on exertion. Negative for claudication, irregular heartbeat, leg swelling, near-syncope, orthopnea, palpitations and syncope.        Past Medical History:   Diagnosis Date    Acute coronary syndrome     Allergy     Arthritis     Cardiomyopathy     CHF (congestive heart failure)     Coronary artery disease     Diverticulitis     Diverticulosis     Former smoker 2022    Heart attack     Heart disease     History of myocardial infarction 2022    Hyperlipidemia     Hypertension     Hypertension 2022    ICD (implantable cardioverter-defibrillator) in place         Past Surgical History:   Procedure Laterality Date    ATRIAL CARDIAC PACEMAKER INSERTION       SECTION      CORONARY STENT PLACEMENT      LEFT HEART CATHETERIZATION Left 2022    Procedure: CATHETERIZATION, HEART, LEFT;  Surgeon: Yohannes Cordova MD;  Location: [facility] CATH LAB;  Service: Cardiology;  Laterality: Left;       Family History   Problem Relation Age of Onset     Heart disease Mother     Emphysema Father     Heart attack Brother        Social History     Socioeconomic History    Marital status:    Tobacco Use    Smoking status: Former Smoker     Packs/day: 0.50     Start date: 1976     Quit date: 2012     Years since quittin.1    Smokeless tobacco: Never Used   Substance and Sexual Activity    Alcohol use: No    Drug use: No       Current Outpatient Medications on File Prior to Visit   Medication Sig Dispense Refill    acetaminophen (TYLENOL) 500 MG tablet Take 2 tablets (1,000 mg total) by mouth every 8 (eight) hours as needed for Pain.  0    albuterol (PROVENTIL/VENTOLIN HFA) 90 mcg/actuation inhaler Inhale 2 puffs into the lungs every 6 (six) hours as needed for Wheezing. Rescue 18 g 1    ALPRAZolam (XANAX) 0.5 MG tablet Take 1 tablet (0.5 mg total) by mouth nightly as needed for Anxiety. 30 tablet 2    aspirin (ECOTRIN) 81 MG EC tablet Take 1 tablet (81 mg total) by mouth once daily. 90 tablet 0    ezetimibe (ZETIA) 10 mg tablet Take 1 tablet (10 mg total) by mouth once daily. 90 tablet 3    fluticasone propionate (FLONASE) 50 mcg/actuation nasal spray 1 spray (50 mcg total) by Each Nostril route once daily. 9.9 mL 1    nitroGLYCERIN (NITROSTAT) 0.4 MG SL tablet Place 1 tablet (0.4 mg total) under the tongue every 5 (five) minutes as needed for Chest pain. 25 tablet 0    omeprazole (PRILOSEC) 40 MG capsule Take 1 capsule (40 mg total) by mouth once daily. (Patient taking differently: Take 40 mg by mouth once daily. Pt reports taking PRN) 90 capsule 2    prasugreL (EFFIENT) 10 mg Tab Take 1 tablet (10 mg total) by mouth once daily. 90 tablet 0    [DISCONTINUED] carvediloL (COREG) 6.25 MG tablet Take 1 tablet (6.25 mg total) by mouth 2 (two) times daily. (Patient taking differently: Take 6.25 mg by mouth 2 (two) times daily. Pt reports she started 12.5 mg PO BID on 2022) 180 tablet 0    [DISCONTINUED] pravastatin (PRAVACHOL) 40 MG  "tablet Take 1 tablet (40 mg total) by mouth once daily. 90 tablet 0    [DISCONTINUED] spironolactone (ALDACTONE) 25 MG tablet Take 0.5 tablets (12.5 mg total) by mouth once daily. 45 tablet 0    [DISCONTINUED] furosemide (LASIX) 20 MG tablet Take 1 tablet (20 mg total) by mouth once daily. (Patient not taking: Reported on 2/2/2022) 90 tablet 0    [DISCONTINUED] valsartan (DIOVAN) 40 MG tablet Take 1 tablet (40 mg total) by mouth once daily. (Patient not taking: Reported on 2/2/2022) 90 tablet 0     Current Facility-Administered Medications on File Prior to Visit   Medication Dose Route Frequency Provider Last Rate Last Admin    [DISCONTINUED] LORazepam tablet 1 mg  1 mg Oral ED 1 Time Joseph Collins MD           Review of patient's allergies indicates:   Allergen Reactions    Augmentin [amoxicillin-pot clavulanate] Swelling     Not allergic to amoxicillin just a derivative of augmentin    Lisinopril Other (See Comments)     Fluid around heart    Losartan Other (See Comments)     High potassium    Shellfish containing products Anaphylaxis     Pt.states she is allergic to SEAFOOD since the age of 13    Levaquin [levofloxacin] Other (See Comments)     Very bad joint pain    Clindamycin Palpitations     Chest pain     Objective:     Vitals:    02/02/22 1318   BP: (!) 124/58   BP Location: Left arm   Patient Position: Sitting   BP Method: Medium (Automatic)   Pulse: 66   SpO2: 100%   Weight: 57.8 kg (127 lb 6.8 oz)   Height: 5' 3" (1.6 m)        Physical Exam  Vitals reviewed.   Constitutional:       Appearance: She is well-developed and well-nourished.   Eyes:      General: No scleral icterus.  Neck:      Vascular: No carotid bruit or JVD.   Cardiovascular:      Rate and Rhythm: Normal rate and regular rhythm.      Heart sounds: No murmur heard.  No gallop.    Pulmonary:      Breath sounds: Normal breath sounds.   Musculoskeletal:         General: No edema.      Right lower leg: No edema.      Left lower " leg: No edema.   Skin:     General: Skin is warm and dry.   Neurological:      Mental Status: She is alert and oriented to person, place, and time.   Psychiatric:         Mood and Affect: Mood and affect normal.         Behavior: Behavior normal.         Thought Content: Thought content normal.         Judgment: Judgment normal.       Generalized rash noted  ·      The procedure log was documented by Documenter: RT Waqas and verified by Yohannes Cordova MD.     Date: 1/24/2022  Time: 5:19 PM       Procedures    CATHETERIZATION, HEART, LEFT     Result Image Hyperlink     Show images for Cardiac catheterization      Pre Procedure Diagnosis    NSTEMI (non-ST elevated myocardial infarction) [I21.4]      Post Procedure Diagnosis    NSTEMI (non-ST elevated myocardial infarction) [I21.4]        Reviewed By    Yohannes Cordova MD on 1/24/2022 17:21     Procedural Details    Odette Hart was counseled regarding the potential benefits, risks, and alternatives to the procedure(s). The patient gave informed consent and consent forms were signed. The patient was brought to the cath lab in a fasting state, prepped, and draped in the usual sterile manner.      Coronary Findings      Diagnostic  Dominance: Right      Left Anterior Descending   Previously placed Ost LAD to Prox LAD stent (unknown type) is widely patent. The lesion was 15 mm long. The previous treatment took place >2 years ago.No restenosis in the previous stent. The strut appeared well apposed.   Mid LAD lesion is 70% stenosed. LAD small caliber distal to lesion.   Left Circumflex   Prox Cx lesion is 80% stenosed. This was the culprit lesion. The lesion was located at the bend. The lesion was < 10 mm long. The lesion shape was concentric. The segment angulation was less than 45 degrees. The lesion was not previously treated. The stenosis was measured by a visual reading.   Mid Cx lesion is 90% stenosed. This is not the culprit lesion. The lesion was 15  mm long.   Right Coronary Artery   Prox RCA lesion is 50% stenosed.   Right Posterior Descending Artery   RPDA lesion is 70% stenosed.   Right Posterior Atrioventricular Artery   RPAV lesion is 60% stenosed.     Intervention        Prox Cx lesion   Stent (Also treats lesions: Mid Cx)   A drug-eluting stent was successfully placed. The stent used was a STENT RESOLUTE AMBER [id number]. The stent was deployed by way of balloon expansion. The maximum pressure reached was 16 bishnu. The inflation time was 10 sec. Balloon removed after expansion of stent The stent balloon was inflated.   Supplies used: STENT RESOLUTE AMBER 2.55F90FR   Post-Intervention Lesion Assessment   An intervention was planned with the diagnostic cath. The intervention was successful. The guidewire crossed the lesion. Intentional subintimal strategy was not used. The device was deployed. The diagnostic JOHN flow was 3. The post-intervention JOHN flow was 3. The TMP grade was 3. The lesion had 18 mm of its length treated. There were no complications. Distal dissection was present following the delivery of the appropriately sized stent..   There is a 0% residual stenosis post intervention.   Mid Cx lesion   Stent   A drug-eluting stent was successfully placed. The stent used was a STENT RESOLUTE AMBER [id number]. The stent was deployed by way of balloon expansion. The maximum pressure reached was 12 bishnu. The inflation time was 15 sec. Balloon removed after expansion of stent The stent balloon was inflated.   Supplies used: STENT RESOLUTE AMBER 2.67B26JO   Stent (Also treats lesions: Prox Cx)   See details in Prox Cx lesion.   Supplies used: STENT RESOLUTE AMBER 2.02V50UT   Post-Intervention Lesion Assessment   The intervention was successful. The guidewire crossed the lesion. Intentional subintimal strategy was not used. The device was deployed. The diagnostic JOHN flow was 2. The post-intervention JOHN flow was 3. The TMP grade was 3. The lesion had 22 mm of  its length treated. There were no complications.   There is a 0% residual stenosis post intervention.     Left Heart Findings      Left Ventricle    LV systolic pressure is decreased. LV end diastolic pressure is elevated.   LVEDP (Pre):20 mmHG       Recommendations     Routine post PCI care.   Maximize medical management.   ASA 81mg.   Statin therapy.   Prasugrel (Effient) for one year.     Complications      Complications documented before study signed (1/24/2022  5:19 PM)       No complications were associated with this study.   Documented by Yohannes Cordova MD - 1/24/2022  5:19 PM         Referring Physician Case Authorizing Physician   Yohannes Cordova MD (Primary)         Indications    NSTEMI (non-ST elevated myocardial infarction) [I21.4 (ICD-10-CM)]     Summary       · The left ventricular end diastolic pressure was elevated.  · The pre-procedure left ventricular end diastolic pressure was 20.  · The Mid LAD lesion was 70% stenosed.  · The Prox Cx lesion was 80% stenosed with 0% stenosis post-intervention.  · The Mid Cx lesion was 90% stenosed with 0% stenosis post-intervention.  · The Prox RCA lesion was 50% stenosed.  · The RPDA lesion was 70% stenosed.  · The RPAV lesion was 60% stenosed.  · A STENT RESOLUTE AMBER 2.82U80DW stent was successfully placed at 16 PJ for 10 sec.  · A STENT RESOLUTE AMBER 2.12U98OU stent was successfully placed at 12 PJ for 15 sec.  · The estimated blood loss was <50 mL.  · There was three vessel coronary artery disease.  · The PCI was successful.     The procedure log was documented by Documenter: RT Waqas and verified by Yohannes Cordova MD.     Date: 1/24/2022  Time: 5:19 PM         Reading physician: Yohannes Cordova MD Ordering physician: Yohannes Cordova MD Study date: 1/24/22       Reason for Exam  Priority: Routine  Dx: Heart failure, systolic, acute on chronic [I50.23 (ICD-10-CM)]   Comments: Please do very early am before cath.     Result Image  Hyperlink     Show images for Echo Saline Bubble? No    Summary    · The left ventricle is severely enlarged with mild eccentric hypertrophy and severely decreased systolic function.  · There are segmental left ventricular wall motion abnormalities. The mid anteroseptal and anterior walls are akinetic. The apex is akinetic. The remainder of the left ventricular walls are severely hypokinetic.  · The estimated ejection fraction is 20%.  · Spontaneous echocardiographic contrast in the left ventricle.  · Grade III left ventricular diastolic dysfunction.  · Severe left atrial enlargement.  · Normal right ventricular size with normal right ventricular systolic function.  · Mild right atrial enlargement.  · Mild aortic regurgitation.  · Mild-to-moderate mitral regurgitation.  · Mild tricuspid regurgitation.  · The estimated PA systolic pressure is 28 mmHg.  · Intermediate central venous pressure (8 mmHg).  · Pacemaker/ICD lead.       Details    Reading Physician Reading Date Result Priority   Gigi Lawson MD  [phone]  [phone] 1/21/2022 STAT     Narrative & Impression  EXAMINATION:  XR CHEST AP PORTABLE     CLINICAL HISTORY:  Chest Pain;     TECHNIQUE:  Single frontal view of the chest was performed.     COMPARISON:  08/02/2021     FINDINGS:  ICD remains in place with leads in similar position as compared to prior exam.  Cardiac silhouette is magnified by portable AP technique.  The heart appears to be at the upper limits of normal in size and unchanged.  Tortuous thoracic aorta.  Pulmonary vascularity is a little more prominent as compared to the previous study and may be mildly congested.  Lungs are satisfactorily expanded.  Mild interstitial accentuation bilaterally which could represent interstitial edema.  There may be a little perihilar alveolar filling on the right.  No pleural fluid or pneumothorax.  Skeletal structures appear intact.     Impression:     Pulmonary vascularity is prominent and may be mildly  congested.  Interstitial accentuation bilaterally which may represent mild interstitial edema.  There may be some perihilar alveolar filling on the right as well.  Findings are concerning for mild congestive heart failure.     This report was flagged in Epic as abnormal.        Electronically signed by: Gigi Lawson MD  Date:                                            01/21/2022     MyChart Results Release    fundfindrt Status: Active  Results Release       PACS Images for [facility] [place] Viewer     Show images for X-Ray Chest 1 View    X-Ray Chest 1 View  Order: [id number]  · Status: Final result     · Visible to patient: Yes (seen)      · Next appt: None     · 0 Result Notes      Details    Reading Physician Reading Date Result Priority   Abelardo Fisher MD  [phone]  [phone] 2/1/2022 STAT     Narrative & Impression  EXAMINATION:  XR CHEST 1 VIEW     CLINICAL HISTORY:  shortness of breath;     TECHNIQUE:  Single frontal view of the chest was performed.     FINDINGS:  The lungs are clear.  There is no pneumothorax or pleural fluid.  The cardiac silhouette is not enlarged.  There is a left-sided cardiac defibrillator.  The osseous structures demonstrate degenerative change.     Impression:     As above.        Electronically signed by: Abelardo Fisher MD  Date:                                            02/01/2022               Assessment:     1. Non-ST elevation myocardial infarction (NSTEMI)    2. RBBB with left anterior fascicular block    3. Ischemic cardiomyopathy    4. Primary hypertension    5. Mixed hyperlipidemia    6. History of myocardial infarction    7. History of coronary artery stent placement    8. Familial hypercholesterolemia    9. Automatic implantable cardioverter-defibrillator in situ    10. Former smoker    11. Polyarthritis with positive rheumatoid factor      Plan:   Odette was seen today for coronary artery disease and hospital follow up.    Diagnoses and all orders for this  visit:    Non-ST elevation myocardial infarction (NSTEMI)    RBBB with left anterior fascicular block    Ischemic cardiomyopathy    Primary hypertension    Mixed hyperlipidemia    History of myocardial infarction    History of coronary artery stent placement    Familial hypercholesterolemia    Automatic implantable cardioverter-defibrillator in situ    Former smoker    Polyarthritis with positive rheumatoid factor  -     Ambulatory referral/consult to Rheumatology; Future    Other orders  -     spironolactone (ALDACTONE) 25 MG tablet; Take 1 tablet (25 mg total) by mouth once daily.  -     carvediloL (COREG) 12.5 MG tablet; Take 1 tablet (12.5 mg total) by mouth 2 (two) times daily with meals.           Due to lowish blood pressure at home and skin rash will continue to hold the valsartan    Due to rash  And lowish BP will discontinue the furosemide     Due to rash will discontinue the pravastatin    Continue carvedilol (and go back up to 12.5 mg bid) and ASA and prasugrel and Zetia.  Increase the spironolactone to 25 mg daily    RTC 2 weeks    If rash is improved and BP is OK we may try a low dose of ARB    F/u with dermatology for rash    If rash improves we might add statin again    Pt wishes to see a rheumatologist for elevated RF    If blood pressure goes below 110 mm Hg would hold the spironolactone and reduce the dose to qod    Follow up in about 2 weeks (around 2/16/2022).

## 2022-02-03 ENCOUNTER — TELEPHONE (OUTPATIENT)
Dept: PRIMARY CARE CLINIC | Facility: CLINIC | Age: 67
End: 2022-02-03
Payer: MEDICARE

## 2022-02-03 ENCOUNTER — TELEPHONE (OUTPATIENT)
Dept: RHEUMATOLOGY | Facility: CLINIC | Age: 67
End: 2022-02-03
Payer: MEDICARE

## 2022-02-03 NOTE — TELEPHONE ENCOUNTER
----- Message from Kourtney Cruz sent at 2/3/2022  8:30 AM CST -----  Contact: [facility]/Odette/  Odette said that she is calling in regards to needing to clarify what discipline the doctor is requesting for Home health services for pt and also to  approve the eval and treat . Please advise

## 2022-02-03 NOTE — TELEPHONE ENCOUNTER
Called the patient and left a message in regards to a new patient appointment. I scheduled her with the first available doctor and placed her on the wait list. I also put the reminder in the mail for the patient. And asked her to call back if she finds a doctor sooner to cancel

## 2022-02-07 ENCOUNTER — TELEPHONE (OUTPATIENT)
Dept: PRIMARY CARE CLINIC | Facility: CLINIC | Age: 67
End: 2022-02-07
Payer: MEDICARE

## 2022-02-07 NOTE — TELEPHONE ENCOUNTER
----- Message from Aracely Mercado sent at 2/7/2022  9:39 AM CST -----  Contact: Jen [phone]  Patient is returning a phone call.  Who left a message for the patient: Pearl  Does patient know what this is regarding:  yes   Would you like a call back, or a response through your [facility] portal?:   call back  Comments:

## 2022-02-07 NOTE — TELEPHONE ENCOUNTER
----- Message from Pamela Kaiser sent at 2/7/2022  8:07 AM CST -----  Contact: [place] with [employer] phone [phone]  [place] with [employer] phone [phone], Calling to get the disciplines that are being ordered for Home Health. Please call her. Thanks.

## 2022-02-07 NOTE — TELEPHONE ENCOUNTER
Pt. Will require skilled nursing confirmed with [place] at [facility] that pt. Can receive eval/treat according to their evaluation - pt. Recently hospitalized on 2/1 saw Cardiology Dr. Arenas on 2/2 needs skilled nursing at home to help with ADL's

## 2022-02-07 NOTE — TELEPHONE ENCOUNTER
Spoke with [facility] representative [place] zeng pt. Will be redirected to another  facility as [facility] is not in network with [facility]

## 2022-02-07 NOTE — TELEPHONE ENCOUNTER
----- Message from Francisca Edouard sent at 2/7/2022 10:49 AM CST -----  Contact: blade friedman  [phone]  Is asking for a return call from corinne

## 2022-02-08 ENCOUNTER — OFFICE VISIT (OUTPATIENT)
Dept: PRIMARY CARE CLINIC | Facility: CLINIC | Age: 67
End: 2022-02-08
Payer: MEDICARE

## 2022-02-08 ENCOUNTER — TELEPHONE (OUTPATIENT)
Dept: PRIMARY CARE CLINIC | Facility: CLINIC | Age: 67
End: 2022-02-08

## 2022-02-08 DIAGNOSIS — J03.90 TONSILLITIS: ICD-10-CM

## 2022-02-08 DIAGNOSIS — J03.01 ACUTE RECURRENT STREPTOCOCCAL TONSILLITIS: Primary | ICD-10-CM

## 2022-02-08 PROCEDURE — G0180 MD CERTIFICATION HHA PATIENT: HCPCS | Mod: ,,, | Performed by: INTERNAL MEDICINE

## 2022-02-08 PROCEDURE — 4010F ACE/ARB THERAPY RXD/TAKEN: CPT | Mod: CPTII,95,, | Performed by: STUDENT IN AN ORGANIZED HEALTH CARE EDUCATION/TRAINING PROGRAM

## 2022-02-08 PROCEDURE — 1160F RVW MEDS BY RX/DR IN RCRD: CPT | Mod: CPTII,95,, | Performed by: STUDENT IN AN ORGANIZED HEALTH CARE EDUCATION/TRAINING PROGRAM

## 2022-02-08 PROCEDURE — 1159F PR MEDICATION LIST DOCUMENTED IN MEDICAL RECORD: ICD-10-PCS | Mod: CPTII,95,, | Performed by: STUDENT IN AN ORGANIZED HEALTH CARE EDUCATION/TRAINING PROGRAM

## 2022-02-08 PROCEDURE — 99214 OFFICE O/P EST MOD 30 MIN: CPT | Mod: 95,,, | Performed by: STUDENT IN AN ORGANIZED HEALTH CARE EDUCATION/TRAINING PROGRAM

## 2022-02-08 PROCEDURE — 4010F PR ACE/ARB THEARPY RXD/TAKEN: ICD-10-PCS | Mod: CPTII,95,, | Performed by: STUDENT IN AN ORGANIZED HEALTH CARE EDUCATION/TRAINING PROGRAM

## 2022-02-08 PROCEDURE — 1160F PR REVIEW ALL MEDS BY PRESCRIBER/CLIN PHARMACIST DOCUMENTED: ICD-10-PCS | Mod: CPTII,95,, | Performed by: STUDENT IN AN ORGANIZED HEALTH CARE EDUCATION/TRAINING PROGRAM

## 2022-02-08 PROCEDURE — 1111F PR DISCHARGE MEDS RECONCILED W/ CURRENT OUTPATIENT MED LIST: ICD-10-PCS | Mod: CPTII,95,, | Performed by: STUDENT IN AN ORGANIZED HEALTH CARE EDUCATION/TRAINING PROGRAM

## 2022-02-08 PROCEDURE — 1111F DSCHRG MED/CURRENT MED MERGE: CPT | Mod: CPTII,95,, | Performed by: STUDENT IN AN ORGANIZED HEALTH CARE EDUCATION/TRAINING PROGRAM

## 2022-02-08 PROCEDURE — 1159F MED LIST DOCD IN RCRD: CPT | Mod: CPTII,95,, | Performed by: STUDENT IN AN ORGANIZED HEALTH CARE EDUCATION/TRAINING PROGRAM

## 2022-02-08 PROCEDURE — 99214 PR OFFICE/OUTPT VISIT, EST, LEVL IV, 30-39 MIN: ICD-10-PCS | Mod: 95,,, | Performed by: STUDENT IN AN ORGANIZED HEALTH CARE EDUCATION/TRAINING PROGRAM

## 2022-02-08 PROCEDURE — G0180 PR HOME HEALTH MD CERTIFICATION: ICD-10-PCS | Mod: ,,, | Performed by: INTERNAL MEDICINE

## 2022-02-08 RX ORDER — PREDNISONE 5 MG/1
TABLET ORAL
Qty: 12 TABLET | Refills: 0 | Status: ON HOLD | OUTPATIENT
Start: 2022-02-08 | End: 2022-02-28 | Stop reason: HOSPADM

## 2022-02-08 RX ORDER — AMOXICILLIN 875 MG/1
875 TABLET, FILM COATED ORAL EVERY 12 HOURS
Qty: 20 TABLET | Refills: 0 | Status: SHIPPED | OUTPATIENT
Start: 2022-02-08 | End: 2022-02-18

## 2022-02-08 RX ORDER — LIDOCAINE HYDROCHLORIDE 20 MG/ML
SOLUTION OROPHARYNGEAL 4 TIMES DAILY PRN
Qty: 100 ML | Refills: 0 | Status: ON HOLD | OUTPATIENT
Start: 2022-02-08 | End: 2022-02-28 | Stop reason: HOSPADM

## 2022-02-08 NOTE — TELEPHONE ENCOUNTER
----- Message from Cristina Candelario sent at 2/8/2022  4:46 PM CST -----  Contact: [phone] [facility]  Pharmacy is calling to clarify an RX.  RX name:  LIDOcaine HCl 2% (LIDOCAINE VISCOUS) 2 % Soln  What do they need to clarify:  How much should be taken 4 times daily    Comments: [address] [place] - [address] [facility] [address] [place] [address]  Phone: [phone] Fax: [phone]  Hours: Not open 24 hours

## 2022-02-08 NOTE — PROGRESS NOTES
The patient location is: [facility]  The chief complaint leading to consultation is: URI    Visit type: audiovisual    Face to Face time with patient: 25  25 minutes of total time spent on the encounter, which includes face to face time and non-face to face time preparing to see the patient (eg, review of tests), Obtaining and/or reviewing separately obtained history, Documenting clinical information in the electronic or other health record, Independently interpreting results (not separately reported) and communicating results to the patient/family/caregiver, or Care coordination (not separately reported).         Each patient to whom he or she provides medical services by telemedicine is:  (1) informed of the relationship between the physician and patient and the respective role of any other health care provider with respect to management of the patient; and (2) notified that he or she may decline to receive medical services by telemedicine and may withdraw from such care at any time.    Notes:     65yo female presenting to clinic for right tonsil swelling and fevers since this weekend.     Woke Friday night with her tonsil hurting.  Has tried gargling with salt water and using listarine.     Use some Tylenol and got sweats.    Has watched her temp, 101.9 degrees.  Says the right tonsil is swollen and is also making her ear hurt.    States she took an old Amoxicillin this AM.    No N/V. No diarrhea.     Has a heart attack and 2 stents were placed last month.      Physical Exam  Constitutional:       Appearance: Normal appearance. She is ill-appearing.   HENT:      Right Ear: External ear normal.      Left Ear: External ear normal.      Nose: No rhinorrhea.   Eyes:      Extraocular Movements: Extraocular movements intact.   Pulmonary:      Effort: Pulmonary effort is normal. No respiratory distress.   Neurological:      Mental Status: She is alert and oriented to person, place, and time.   Psychiatric:         Mood  and Affect: Mood normal.         Behavior: Behavior normal.             Tonsillitis:   - has sore throat, swollen tonsils.   - Amoxicillin 875mg BID for 10 days for tonsillitis.    - has not tolerated Augmentin before but has tolerated Amoxicillin.     - Prednisone 15mg daily for 2 days, then 10mg daily for 2 days, then 5mg daily for 2 days.   - would also use some Viscous lidocaine, 5mg as need up to 4 times daily for sore throat. .    - advise use of pro-biotics due to duration of antibiotics needed for treating tonsillitis.   - advised patient to be mindful of her airway, if feeling that her airway is narrowing or tonsill is swelling more in a way that threatens the airway she should come the the ED for help immediately.

## 2022-02-08 NOTE — PATIENT INSTRUCTIONS
Tonsillitis:   - has sore throat, swollen tonsils.   - Amoxicillin 875mg BID for 10 days for tonsillitis   - Prednisone 15mg daily for 2 days, then 10mg daily for 2 days, then 5mg daily for 2 days.   - would also use some Viscous lidocaine, 5mg as need up to 4 times daily for sore throat. .    - advise use of pro-biotics due to duration of antibiotics needed for treating tonsillitis.   - advised patient to be mindful of her airway, if feeling that her airway is narrowing or tonsill is swelling more in a way that threatens the airway she should come the the ED for help immediately.

## 2022-02-17 ENCOUNTER — OFFICE VISIT (OUTPATIENT)
Dept: CARDIOLOGY | Facility: CLINIC | Age: 67
End: 2022-02-17
Payer: MEDICARE

## 2022-02-17 VITALS
BODY MASS INDEX: 22.69 KG/M2 | HEIGHT: 63 IN | DIASTOLIC BLOOD PRESSURE: 58 MMHG | WEIGHT: 128.06 LBS | HEART RATE: 69 BPM | SYSTOLIC BLOOD PRESSURE: 121 MMHG | OXYGEN SATURATION: 98 %

## 2022-02-17 DIAGNOSIS — I45.2 RBBB WITH LEFT ANTERIOR FASCICULAR BLOCK: ICD-10-CM

## 2022-02-17 DIAGNOSIS — Z95.5 HISTORY OF CORONARY ARTERY STENT PLACEMENT: ICD-10-CM

## 2022-02-17 DIAGNOSIS — E78.5 HYPERLIPIDEMIA, UNSPECIFIED HYPERLIPIDEMIA TYPE: ICD-10-CM

## 2022-02-17 DIAGNOSIS — I25.5 ISCHEMIC CARDIOMYOPATHY: Primary | ICD-10-CM

## 2022-02-17 DIAGNOSIS — I25.2 HISTORY OF MYOCARDIAL INFARCTION: ICD-10-CM

## 2022-02-17 DIAGNOSIS — I50.42 CHRONIC COMBINED SYSTOLIC AND DIASTOLIC CONGESTIVE HEART FAILURE: ICD-10-CM

## 2022-02-17 DIAGNOSIS — Z95.810 AUTOMATIC IMPLANTABLE CARDIOVERTER-DEFIBRILLATOR IN SITU: ICD-10-CM

## 2022-02-17 DIAGNOSIS — I25.10 CORONARY ARTERY DISEASE INVOLVING NATIVE CORONARY ARTERY OF NATIVE HEART WITHOUT ANGINA PECTORIS: ICD-10-CM

## 2022-02-17 PROCEDURE — 1160F PR REVIEW ALL MEDS BY PRESCRIBER/CLIN PHARMACIST DOCUMENTED: ICD-10-PCS | Mod: CPTII,S$GLB,, | Performed by: INTERNAL MEDICINE

## 2022-02-17 PROCEDURE — 99999 PR PBB SHADOW E&M-EST. PATIENT-LVL III: ICD-10-PCS | Mod: PBBFAC,,, | Performed by: INTERNAL MEDICINE

## 2022-02-17 PROCEDURE — 1126F AMNT PAIN NOTED NONE PRSNT: CPT | Mod: CPTII,S$GLB,, | Performed by: INTERNAL MEDICINE

## 2022-02-17 PROCEDURE — 1111F PR DISCHARGE MEDS RECONCILED W/ CURRENT OUTPATIENT MED LIST: ICD-10-PCS | Mod: CPTII,S$GLB,, | Performed by: INTERNAL MEDICINE

## 2022-02-17 PROCEDURE — 3008F PR BODY MASS INDEX (BMI) DOCUMENTED: ICD-10-PCS | Mod: CPTII,S$GLB,, | Performed by: INTERNAL MEDICINE

## 2022-02-17 PROCEDURE — 3074F PR MOST RECENT SYSTOLIC BLOOD PRESSURE < 130 MM HG: ICD-10-PCS | Mod: CPTII,S$GLB,, | Performed by: INTERNAL MEDICINE

## 2022-02-17 PROCEDURE — 3074F SYST BP LT 130 MM HG: CPT | Mod: CPTII,S$GLB,, | Performed by: INTERNAL MEDICINE

## 2022-02-17 PROCEDURE — 3288F PR FALLS RISK ASSESSMENT DOCUMENTED: ICD-10-PCS | Mod: CPTII,S$GLB,, | Performed by: INTERNAL MEDICINE

## 2022-02-17 PROCEDURE — 3078F PR MOST RECENT DIASTOLIC BLOOD PRESSURE < 80 MM HG: ICD-10-PCS | Mod: CPTII,S$GLB,, | Performed by: INTERNAL MEDICINE

## 2022-02-17 PROCEDURE — 1101F PT FALLS ASSESS-DOCD LE1/YR: CPT | Mod: CPTII,S$GLB,, | Performed by: INTERNAL MEDICINE

## 2022-02-17 PROCEDURE — 99499 RISK ADDL DX/OHS AUDIT: ICD-10-PCS | Mod: S$GLB,,, | Performed by: INTERNAL MEDICINE

## 2022-02-17 PROCEDURE — 99999 PR PBB SHADOW E&M-EST. PATIENT-LVL III: CPT | Mod: PBBFAC,,, | Performed by: INTERNAL MEDICINE

## 2022-02-17 PROCEDURE — 3078F DIAST BP <80 MM HG: CPT | Mod: CPTII,S$GLB,, | Performed by: INTERNAL MEDICINE

## 2022-02-17 PROCEDURE — 1101F PR PT FALLS ASSESS DOC 0-1 FALLS W/OUT INJ PAST YR: ICD-10-PCS | Mod: CPTII,S$GLB,, | Performed by: INTERNAL MEDICINE

## 2022-02-17 PROCEDURE — 99214 OFFICE O/P EST MOD 30 MIN: CPT | Mod: S$GLB,,, | Performed by: INTERNAL MEDICINE

## 2022-02-17 PROCEDURE — 4010F PR ACE/ARB THEARPY RXD/TAKEN: ICD-10-PCS | Mod: CPTII,S$GLB,, | Performed by: INTERNAL MEDICINE

## 2022-02-17 PROCEDURE — 1111F DSCHRG MED/CURRENT MED MERGE: CPT | Mod: CPTII,S$GLB,, | Performed by: INTERNAL MEDICINE

## 2022-02-17 PROCEDURE — 1159F PR MEDICATION LIST DOCUMENTED IN MEDICAL RECORD: ICD-10-PCS | Mod: CPTII,S$GLB,, | Performed by: INTERNAL MEDICINE

## 2022-02-17 PROCEDURE — 1126F PR PAIN SEVERITY QUANTIFIED, NO PAIN PRESENT: ICD-10-PCS | Mod: CPTII,S$GLB,, | Performed by: INTERNAL MEDICINE

## 2022-02-17 PROCEDURE — 1159F MED LIST DOCD IN RCRD: CPT | Mod: CPTII,S$GLB,, | Performed by: INTERNAL MEDICINE

## 2022-02-17 PROCEDURE — 1160F RVW MEDS BY RX/DR IN RCRD: CPT | Mod: CPTII,S$GLB,, | Performed by: INTERNAL MEDICINE

## 2022-02-17 PROCEDURE — 3008F BODY MASS INDEX DOCD: CPT | Mod: CPTII,S$GLB,, | Performed by: INTERNAL MEDICINE

## 2022-02-17 PROCEDURE — 99499 UNLISTED E&M SERVICE: CPT | Mod: S$GLB,,, | Performed by: INTERNAL MEDICINE

## 2022-02-17 PROCEDURE — 3288F FALL RISK ASSESSMENT DOCD: CPT | Mod: CPTII,S$GLB,, | Performed by: INTERNAL MEDICINE

## 2022-02-17 PROCEDURE — 4010F ACE/ARB THERAPY RXD/TAKEN: CPT | Mod: CPTII,S$GLB,, | Performed by: INTERNAL MEDICINE

## 2022-02-17 PROCEDURE — 99214 PR OFFICE/OUTPT VISIT, EST, LEVL IV, 30-39 MIN: ICD-10-PCS | Mod: S$GLB,,, | Performed by: INTERNAL MEDICINE

## 2022-02-17 NOTE — PROGRESS NOTES
"  Subjective:      Patient ID: Odette Hart is a 66 y.o. female.    Chief Complaint: Follow-up    HPI:  "I feel good"    Walks in yard    Has stationary bike    "I feel like I am getting my strength back."    Pt took antibiotics and steroids for right tonsillitis.    Rash is doing better and no longer itches.    Pt has chronic pain in shoulders and numbness of her hands and pain in her ribs.    Pt took prednisone with relief.    "It wasn't pain in my heart"    Pt has a home health nurse.    Pt had steroid shots in wrists by Dr Will Ross.    "I can't sleep at night due to shoulder pains radiating down both arms."    "My knees swell up sometimes."    Wrists hurt.    Rheumatology consult is pending 6/3/22.    "I get a headache when I take the blood thinner."    Review of Systems   Cardiovascular: Positive for chest pain and irregular heartbeat (rare, transient flutter in chest). Negative for claudication, dyspnea on exertion, leg swelling, near-syncope, orthopnea, palpitations and syncope.      Pt only took spironolactone once over the past week. Pt takes the spironolactone prn (once or twice a week)          Past Medical History:   Diagnosis Date    Acute coronary syndrome     Allergy     Arthritis     Cardiomyopathy     CHF (congestive heart failure)     Coronary artery disease     Diverticulitis     Diverticulosis     Former smoker 2022    Heart attack     Heart disease     History of myocardial infarction 2022    Hyperlipidemia     Hypertension     Hypertension 2022    ICD (implantable cardioverter-defibrillator) in place         Past Surgical History:   Procedure Laterality Date    ATRIAL CARDIAC PACEMAKER INSERTION       SECTION      CORONARY STENT PLACEMENT      LEFT HEART CATHETERIZATION Left 2022    Procedure: CATHETERIZATION, HEART, LEFT;  Surgeon: Yohannes Cordova MD;  Location: [facility] CATH LAB;  Service: Cardiology;  Laterality: Left;       Family History "   Problem Relation Age of Onset    Heart disease Mother     Emphysema Father     Heart attack Brother        Social History     Socioeconomic History    Marital status:    Tobacco Use    Smoking status: Former Smoker     Packs/day: 0.50     Start date: 1976     Quit date: 2012     Years since quittin.2    Smokeless tobacco: Never Used   Substance and Sexual Activity    Alcohol use: No    Drug use: No       Current Outpatient Medications on File Prior to Visit   Medication Sig Dispense Refill    acetaminophen (TYLENOL) 500 MG tablet Take 2 tablets (1,000 mg total) by mouth every 8 (eight) hours as needed for Pain.  0    albuterol (PROVENTIL/VENTOLIN HFA) 90 mcg/actuation inhaler Inhale 2 puffs into the lungs every 6 (six) hours as needed for Wheezing. Rescue 18 g 1    ALPRAZolam (XANAX) 0.5 MG tablet Take 1 tablet (0.5 mg total) by mouth nightly as needed for Anxiety. 30 tablet 2    amoxicillin (AMOXIL) 875 MG tablet Take 1 tablet (875 mg total) by mouth every 12 (twelve) hours. for 10 days 20 tablet 0    aspirin (ECOTRIN) 81 MG EC tablet Take 1 tablet (81 mg total) by mouth once daily. 90 tablet 0    carvediloL (COREG) 12.5 MG tablet Take 1 tablet (12.5 mg total) by mouth 2 (two) times daily with meals. 180 tablet 3    ezetimibe (ZETIA) 10 mg tablet Take 1 tablet (10 mg total) by mouth once daily. 90 tablet 3    fluticasone propionate (FLONASE) 50 mcg/actuation nasal spray 1 spray (50 mcg total) by Each Nostril route once daily. 9.9 mL 1    nitroGLYCERIN (NITROSTAT) 0.4 MG SL tablet Place 1 tablet (0.4 mg total) under the tongue every 5 (five) minutes as needed for Chest pain. 25 tablet 0    omeprazole (PRILOSEC) 40 MG capsule Take 1 capsule (40 mg total) by mouth once daily. 90 capsule 2    prasugreL (EFFIENT) 10 mg Tab Take 1 tablet (10 mg total) by mouth once daily. 90 tablet 0    predniSONE (DELTASONE) 5 MG tablet 15mg (3 tabs) daily for 2 days, then 10mg (2 tabs) daily  "for 2 days, then 5mg (1 tab) daily for 2 days. 12 tablet 0    spironolactone (ALDACTONE) 25 MG tablet Take 1 tablet (25 mg total) by mouth once daily. 90 tablet 3    LIDOcaine HCl 2% (LIDOCAINE VISCOUS) 2 % Soln by Mucous Membrane route 4 (four) times daily as needed (sore throat). (Patient not taking: Reported on 2/17/2022) 100 mL 0     No current facility-administered medications on file prior to visit.       Review of patient's allergies indicates:   Allergen Reactions    Augmentin [amoxicillin-pot clavulanate] Swelling     Not allergic to amoxicillin just a derivative of augmentin    Lisinopril Other (See Comments)     Fluid around heart    Losartan Other (See Comments)     High potassium    Shellfish containing products Anaphylaxis     Pt.states she is allergic to SEAFOOD since the age of 13    Levaquin [levofloxacin] Other (See Comments)     Very bad joint pain    Clindamycin Palpitations     Chest pain     Objective:     Vitals:    02/17/22 1353   BP: (!) 121/58   BP Location: Left arm   Patient Position: Sitting   BP Method: Medium (Automatic)   Pulse: 69   SpO2: 98%   Weight: 58.1 kg (128 lb 1.4 oz)   Height: 5' 3" (1.6 m)        Physical Exam  Constitutional:       Appearance: She is well-developed and well-nourished.   Eyes:      General: No scleral icterus.  Neck:      Vascular: No carotid bruit or JVD.   Cardiovascular:      Rate and Rhythm: Normal rate and regular rhythm.      Heart sounds: Murmur (III/VI systolic) heard.   No gallop.    Pulmonary:      Breath sounds: Normal breath sounds.   Musculoskeletal:         General: No edema.      Left lower leg: No edema.   Skin:     General: Skin is warm and dry.      Comments: Fading generalized rash     Neurological:      Mental Status: She is alert and oriented to person, place, and time.   Psychiatric:         Mood and Affect: Mood and affect normal.         Behavior: Behavior normal.         Thought Content: Thought content normal.         " Judgment: Judgment normal.        Admission on 02/01/2022, Discharged on 02/01/2022   Component Date Value Ref Range Status    WBC 02/01/2022 4.52  3.90 - 12.70 K/uL Final    RBC 02/01/2022 4.82  4.00 - 5.40 M/uL Final    Hemoglobin 02/01/2022 12.7  12.0 - 16.0 g/dL Final    Hematocrit 02/01/2022 39.8  37.0 - 48.5 % Final    MCV 02/01/2022 83  82 - 98 fL Final    MCH 02/01/2022 26.3* 27.0 - 31.0 pg Final    MCHC 02/01/2022 31.9* 32.0 - 36.0 g/dL Final    RDW 02/01/2022 19.6* 11.5 - 14.5 % Final    Platelets 02/01/2022 310  150 - 450 K/uL Final    MPV 02/01/2022 9.7  9.2 - 12.9 fL Final    Immature Granulocytes 02/01/2022 CANCELED  0.0 - 0.5 % Final-Edited    Immature Grans (Abs) 02/01/2022 CANCELED  0.00 - 0.04 K/uL Final-Edited    nRBC 02/01/2022 0  0 /100 WBC Final    Gran % 02/01/2022 19.0* 38.0 - 73.0 % Final    Lymph % 02/01/2022 74.0* 18.0 - 48.0 % Final    Mono % 02/01/2022 7.0  4.0 - 15.0 % Final    Eosinophil % 02/01/2022 0.0  0.0 - 8.0 % Final    Basophil % 02/01/2022 0.0  0.0 - 1.9 % Final    Platelet Estimate 02/01/2022 Appears normal   Final    Aniso 02/01/2022 Slight   Final    Hypo 02/01/2022 Occasional   Final    Ovalocytes 02/01/2022 Occasional   Final    Differential Method 02/01/2022 Manual   Corrected    Sodium 02/01/2022 138  136 - 145 mmol/L Final    Potassium 02/01/2022 4.3  3.5 - 5.1 mmol/L Final    Chloride 02/01/2022 101  95 - 110 mmol/L Final    CO2 02/01/2022 22* 23 - 29 mmol/L Final    Glucose 02/01/2022 124* 70 - 110 mg/dL Final    BUN 02/01/2022 14  8 - 23 mg/dL Final    Creatinine 02/01/2022 0.8  0.5 - 1.4 mg/dL Final    Calcium 02/01/2022 9.2  8.7 - 10.5 mg/dL Final    Total Protein 02/01/2022 7.6  6.0 - 8.4 g/dL Final    Albumin 02/01/2022 3.1* 3.5 - 5.2 g/dL Final    Total Bilirubin 02/01/2022 0.8  0.1 - 1.0 mg/dL Final    Alkaline Phosphatase 02/01/2022 109  55 - 135 U/L Final    AST 02/01/2022 33  10 - 40 U/L Final    ALT 02/01/2022 32  10 -  44 U/L Final    Anion Gap 02/01/2022 15  8 - 16 mmol/L Final    eGFR if African American 02/01/2022 >60.0  >60 mL/min/1.73 m^2 Final    eGFR if non African American 02/01/2022 >60.0  >60 mL/min/1.73 m^2 Final    POC Rapid COVID 02/01/2022 Negative  Negative Final     Acceptable 02/01/2022 Yes   Final    Troponin I 02/01/2022 0.044* 0.000 - 0.026 ng/mL Final    BNP 02/01/2022 377* 0 - 99 pg/mL Final    Troponin I 02/01/2022 0.043* 0.000 - 0.026 ng/mL Final   No results displayed because visit has over 200 results.      Admission on 01/21/2022, Discharged on 01/22/2022   Component Date Value Ref Range Status    WBC 01/21/2022 2.26* 3.90 - 12.70 K/uL Final    RBC 01/21/2022 4.68  4.00 - 5.40 M/uL Final    Hemoglobin 01/21/2022 12.5  12.0 - 16.0 g/dL Final    Hematocrit 01/21/2022 39.4  37.0 - 48.5 % Final    MCV 01/21/2022 84  82 - 98 fL Final    MCH 01/21/2022 26.7* 27.0 - 31.0 pg Final    MCHC 01/21/2022 31.7* 32.0 - 36.0 g/dL Final    RDW 01/21/2022 20.3* 11.5 - 14.5 % Final    Platelets 01/21/2022 360  150 - 450 K/uL Final    MPV 01/21/2022 9.8  9.2 - 12.9 fL Final    Immature Granulocytes 01/21/2022 Test Not Performed  0.0 - 0.5 % Corrected    Immature Grans (Abs) 01/21/2022 Test Not Performed  0.00 - 0.04 K/uL Corrected    nRBC 01/21/2022 0  0 /100 WBC Final    Gran % 01/21/2022 1.0* 38.0 - 73.0 % Corrected    Lymph % 01/21/2022 76.0* 18.0 - 48.0 % Corrected    Mono % 01/21/2022 23.0* 4.0 - 15.0 % Corrected    Eosinophil % 01/21/2022 0.0  0.0 - 8.0 % Final    Basophil % 01/21/2022 0.0  0.0 - 1.9 % Corrected    Platelet Estimate 01/21/2022 Appears normal   Final    Aniso 01/21/2022 Moderate   Final    Acanthocytes 01/21/2022 Present   Final    Large/Giant Platelets 01/21/2022 Present   Final    Differential Method 01/21/2022 Manual   Corrected    Sodium 01/21/2022 138  136 - 145 mmol/L Final    Potassium 01/21/2022 4.7  3.5 - 5.1 mmol/L Final    Chloride 01/21/2022  103  95 - 110 mmol/L Final    CO2 01/21/2022 22* 23 - 29 mmol/L Final    Glucose 01/21/2022 128* 70 - 110 mg/dL Final    BUN 01/21/2022 17  8 - 23 mg/dL Final    Creatinine 01/21/2022 0.8  0.5 - 1.4 mg/dL Final    Calcium 01/21/2022 9.0  8.7 - 10.5 mg/dL Final    Total Protein 01/21/2022 7.6  6.0 - 8.4 g/dL Final    Albumin 01/21/2022 2.8* 3.5 - 5.2 g/dL Final    Total Bilirubin 01/21/2022 0.6  0.1 - 1.0 mg/dL Final    Alkaline Phosphatase 01/21/2022 95  55 - 135 U/L Final    AST 01/21/2022 49* 10 - 40 U/L Final    ALT 01/21/2022 33  10 - 44 U/L Final    Anion Gap 01/21/2022 13  8 - 16 mmol/L Final    eGFR if African American 01/21/2022 >60.0  >60 mL/min/1.73 m^2 Final    eGFR if non African American 01/21/2022 >60.0  >60 mL/min/1.73 m^2 Final    Troponin I 01/21/2022 3.325* 0.000 - 0.026 ng/mL Final    BNP 01/21/2022 1,692* 0 - 99 pg/mL Final    POC Rapid COVID 01/21/2022 Negative  Negative Final     Acceptable 01/21/2022 Yes   Final    Troponin I 01/21/2022 8.992* 0.000 - 0.026 ng/mL Final    Troponin I 01/21/2022 8.581* 0.000 - 0.026 ng/mL Final    Troponin I 01/22/2022 6.745* 0.000 - 0.026 ng/mL Final    aPTT 01/22/2022 29.0  21.0 - 32.0 sec Final    Prothrombin Time 01/22/2022 11.3  9.0 - 12.5 sec Final    INR 01/22/2022 1.0  0.8 - 1.2 Final    WBC 01/22/2022 3.82* 3.90 - 12.70 K/uL Final    RBC 01/22/2022 4.33  4.00 - 5.40 M/uL Final    Hemoglobin 01/22/2022 11.6* 12.0 - 16.0 g/dL Final    Hematocrit 01/22/2022 36.7* 37.0 - 48.5 % Final    MCV 01/22/2022 85  82 - 98 fL Final    MCH 01/22/2022 26.8* 27.0 - 31.0 pg Final    MCHC 01/22/2022 31.6* 32.0 - 36.0 g/dL Final    RDW 01/22/2022 20.2* 11.5 - 14.5 % Final    Platelets 01/22/2022 298  150 - 450 K/uL Final    MPV 01/22/2022 9.8  9.2 - 12.9 fL Final    Immature Granulocytes 01/22/2022 CANCELED  0.0 - 0.5 % Final    Immature Grans (Abs) 01/22/2022 CANCELED  0.00 - 0.04 K/uL Final    nRBC 01/22/2022 0  0 /100  WBC Final    Gran % 01/22/2022 0.0* 38.0 - 73.0 % Final    Lymph % 01/22/2022 82.0* 18.0 - 48.0 % Final    Mono % 01/22/2022 18.0* 4.0 - 15.0 % Final    Eosinophil % 01/22/2022 0.0  0.0 - 8.0 % Final    Basophil % 01/22/2022 0.0  0.0 - 1.9 % Final    Platelet Estimate 01/22/2022 Appears normal   Final    Aniso 01/22/2022 Moderate   Final    Spherocytes 01/22/2022 Occasional   Final    Differential Method 01/22/2022 Manual   Corrected    Sodium 01/22/2022 134* 136 - 145 mmol/L Final    Potassium 01/22/2022 4.6  3.5 - 5.1 mmol/L Final    Chloride 01/22/2022 99  95 - 110 mmol/L Final    CO2 01/22/2022 22* 23 - 29 mmol/L Final    Glucose 01/22/2022 103  70 - 110 mg/dL Final    BUN 01/22/2022 17  8 - 23 mg/dL Final    Creatinine 01/22/2022 0.7  0.5 - 1.4 mg/dL Final    Calcium 01/22/2022 9.1  8.7 - 10.5 mg/dL Final    Total Protein 01/22/2022 7.3  6.0 - 8.4 g/dL Final    Albumin 01/22/2022 2.7* 3.5 - 5.2 g/dL Final    Total Bilirubin 01/22/2022 0.9  0.1 - 1.0 mg/dL Final    Alkaline Phosphatase 01/22/2022 94  55 - 135 U/L Final    AST 01/22/2022 56* 10 - 40 U/L Final    ALT 01/22/2022 30  10 - 44 U/L Final    Anion Gap 01/22/2022 13  8 - 16 mmol/L Final    eGFR if African American 01/22/2022 >60.0  >60 mL/min/1.73 m^2 Final    eGFR if non African American 01/22/2022 >60.0  >60 mL/min/1.73 m^2 Final    aPTT 01/22/2022 32.7* 21.0 - 32.0 sec Final   Appointment on 12/16/2021   Component Date Value Ref Range Status    Color, UA 12/16/2021 Yellow   Final    pH, UA 12/16/2021 Trace   Final    WBC, UA 12/16/2021 Neg   Final    Nitrite, UA 12/16/2021 trace   Final    Protein, POC 12/16/2021 normal   Final    Glucose, UA 12/16/2021 neg   Final    Ketones, UA 12/16/2021 neg   Final    Urobilinogen, UA 12/16/2021 normal   Final    Bilirubin, POC 12/16/2021 neg   Final    Blood, UA 12/16/2021 about 50   Final    Clarity, UA 12/16/2021 Cloudy   Final    Spec Grav UA 12/16/2021 1.020   Final    Office Visit on 11/01/2021   Component Date Value Ref Range Status    Color, UA 11/01/2021 Yellow   Final    pH, UA 11/01/2021 5   Final    WBC, UA 11/01/2021 ++   Final    Nitrite, UA 11/01/2021 neg   Final    Protein, POC 11/01/2021 trace   Final    Glucose, UA 11/01/2021 norm   Final    Ketones, UA 11/01/2021 neg   Final    Urobilinogen, UA 11/01/2021 norm   Final    Bilirubin, POC 11/01/2021 neg   Final    Blood, UA 11/01/2021 50   Final    Clarity, UA 11/01/2021 Cloudy   Final    Spec Grav UA 11/01/2021 1.025   Final   Lab Visit on 10/25/2021   Component Date Value Ref Range Status    WBC 10/25/2021 7.51  3.90 - 12.70 K/uL Final    RBC 10/25/2021 4.90  4.00 - 5.40 M/uL Final    Hemoglobin 10/25/2021 13.2  12.0 - 16.0 g/dL Final    Hematocrit 10/25/2021 39.4  37.0 - 48.5 % Final    MCV 10/25/2021 80* 82 - 98 fL Final    MCH 10/25/2021 26.9* 27.0 - 31.0 pg Final    MCHC 10/25/2021 33.5  32.0 - 36.0 g/dL Final    RDW 10/25/2021 17.9* 11.5 - 14.5 % Final    Platelets 10/25/2021 269  150 - 450 K/uL Final    MPV 10/25/2021 10.1  9.2 - 12.9 fL Final    Immature Granulocytes 10/25/2021 0.3  0.0 - 0.5 % Final    Gran # (ANC) 10/25/2021 2.4  1.8 - 7.7 K/uL Final    Immature Grans (Abs) 10/25/2021 0.02  0.00 - 0.04 K/uL Final    Lymph # 10/25/2021 3.6  1.0 - 4.8 K/uL Final    Mono # 10/25/2021 1.4* 0.3 - 1.0 K/uL Final    Eos # 10/25/2021 0.0  0.0 - 0.5 K/uL Final    Baso # 10/25/2021 0.05  0.00 - 0.20 K/uL Final    nRBC 10/25/2021 0  0 /100 WBC Final    Gran % 10/25/2021 32.3* 38.0 - 73.0 % Final    Lymph % 10/25/2021 47.7  18.0 - 48.0 % Final    Mono % 10/25/2021 19.0* 4.0 - 15.0 % Final    Eosinophil % 10/25/2021 0.0  0.0 - 8.0 % Final    Basophil % 10/25/2021 0.7  0.0 - 1.9 % Final    Differential Method 10/25/2021 Automated   Final    Sodium 10/25/2021 135* 136 - 145 mmol/L Final    Potassium 10/25/2021 4.9  3.5 - 5.1 mmol/L Final    Chloride 10/25/2021 101  95 - 110 mmol/L  Final    CO2 10/25/2021 28  23 - 29 mmol/L Final    Glucose 10/25/2021 115* 70 - 110 mg/dL Final    BUN 10/25/2021 16  8 - 23 mg/dL Final    Creatinine 10/25/2021 0.9  0.5 - 1.4 mg/dL Final    Calcium 10/25/2021 9.8  8.7 - 10.5 mg/dL Final    Total Protein 10/25/2021 7.4  6.0 - 8.4 g/dL Final    Albumin 10/25/2021 2.9* 3.5 - 5.2 g/dL Final    Total Bilirubin 10/25/2021 0.6  0.1 - 1.0 mg/dL Final    Alkaline Phosphatase 10/25/2021 96  55 - 135 U/L Final    AST 10/25/2021 20  10 - 40 U/L Final    ALT 10/25/2021 17  10 - 44 U/L Final    Anion Gap 10/25/2021 6* 8 - 16 mmol/L Final    eGFR if African American 10/25/2021 >60.0  >60 mL/min/1.73 m^2 Final    eGFR if non African American 10/25/2021 >60.0  >60 mL/min/1.73 m^2 Final   Lab Visit on 09/21/2021   Component Date Value Ref Range Status    SARS-CoV2 (COVID-19) Qualitative P* 09/21/2021 Not Detected  Not Detected Final    SARS-COV-2- Cycle Number 09/21/2021 N/A   Final   Lab Visit on 09/16/2021   Component Date Value Ref Range Status    [place] Screen 09/16/2021 Positive* Negative <1:80 Final    CCP Antibodies 09/16/2021 >1173.6* <5.0 U/mL Final    Rheumatoid Factor 09/16/2021 795.0* 0.0 - 15.0 IU/mL Final    Sed Rate 09/16/2021 40* 0 - 20 mm/Hr Final    [place] PATTERN 1 09/16/2021 Homogeneous   Final    Anti Sm Antibody 09/16/2021 0.08  0.00 - 0.99 Ratio Final    Anti-Sm Interpretation 09/16/2021 Negative  Negative Final    Anti-SSA Antibody 09/16/2021 0.08  0.00 - 0.99 Ratio Final    Anti-SSA Interpretation 09/16/2021 Negative  Negative Final    Anti-SSB Antibody 09/16/2021 0.06  0.00 - 0.99 Ratio Final    Anti-SSB Interpretation 09/16/2021 Negative  Negative Final    ds DNA Ab 09/16/2021 Negative 1:10  Negative 1:10 Final    Anti Sm/RNP Antibody 09/16/2021 0.31  0.00 - 0.99 Ratio Final    Anti-Sm/RNP Interpretation 09/16/2021 Negative  Negative Final    [place] Titer 1 09/16/2021 >=1:2560   Final   (        0747 2/1/2022 STAT     Narrative &  Impression  EXAMINATION:  XR CHEST 1 VIEW     CLINICAL HISTORY:  shortness of breath;     TECHNIQUE:  Single frontal view of the chest was performed.     FINDINGS:  The lungs are clear.  There is no pneumothorax or pleural fluid.  The cardiac silhouette is not enlarged.  There is a left-sided cardiac defibrillator.  The osseous structures demonstrate degenerative change.     Impression:     As above.        Electronically signed by: Abelardo Fisher MD  Date:                                            02/01/2022  Time:                                           10:06             Exam Ended: 02/01/22 10:03 Last Resulted: 02/01/22 10:06                Details    Reading Physician Reading Date Result Priority   Gigi Lawson MD  [phone]  [phone] 1/21/2022 STAT     Narrative & Impression  EXAMINATION:  XR CHEST AP PORTABLE     CLINICAL HISTORY:  Chest Pain;     TECHNIQUE:  Single frontal view of the chest was performed.     COMPARISON:  08/02/2021     FINDINGS:  ICD remains in place with leads in similar position as compared to prior exam.  Cardiac silhouette is magnified by portable AP technique.  The heart appears to be at the upper limits of normal in size and unchanged.  Tortuous thoracic aorta.  Pulmonary vascularity is a little more prominent as compared to the previous study and may be mildly congested.  Lungs are satisfactorily expanded.  Mild interstitial accentuation bilaterally which could represent interstitial edema.  There may be a little perihilar alveolar filling on the right.  No pleural fluid or pneumothorax.  Skeletal structures appear intact.     Impression:     Pulmonary vascularity is prominent and may be mildly congested.  Interstitial accentuation bilaterally which may represent mild interstitial edema.  There may be some perihilar alveolar filling on the right as well.  Findings are concerning for mild congestive heart failure.     This report was flagged in Epic as abnormal.         Electronically signed by: Gigi Lawson MD  Date:                                            01/21/2022       Patient Information    Name MRN Description   Odette Hart [id number] 66 y.o. female       Physicians    Panel Physicians Referring Physician Case Authorizing Physician   Yohannes Cordova MD (Primary)       Indications    NSTEMI (non-ST elevated myocardial infarction) [I21.4 (ICD-10-CM)]     Summary       · The left ventricular end diastolic pressure was elevated.  · The pre-procedure left ventricular end diastolic pressure was 20.  · The Mid LAD lesion was 70% stenosed.  · The Prox Cx lesion was 80% stenosed with 0% stenosis post-intervention.  · The Mid Cx lesion was 90% stenosed with 0% stenosis post-intervention.  · The Prox RCA lesion was 50% stenosed.  · The RPDA lesion was 70% stenosed.  · The RPAV lesion was 60% stenosed.  · A STENT RESOLUTE AMBER [id number] stent was successfully placed at 16 PJ for 10 sec.  · A STENT RESOLUTE AMBER [id number] stent was successfully placed at 12 PJ for 15 sec.  · The estimated blood loss was <50 mL.  · There was three vessel coronary artery disease.  · The PCI was successful.     The procedure log was documented by Documenter: RT Waqas and verified by Yohannes Cordova MD.     Date: 1/24/2022  Time: 5:19 PM    0747 2/1/2022 STAT     Narrative & Impression  EXAMINATION:  XR CHEST 1 VIEW     CLINICAL HISTORY:  shortness of breath;     TECHNIQUE:  Single frontal view of the chest was performed.     FINDINGS:  The lungs are clear.  There is no pneumothorax or pleural fluid.  The cardiac silhouette is not enlarged.  There is a left-sided cardiac defibrillator.  The osseous structures demonstrate degenerative change.     Impression:     As above.        Electronically signed by: Abelardo Fisher MD  Date:                                            02/01/2022  Time:                                           10:06             Exam Ended: 02/01/22 10:03 Last Resulted:  02/01/22 10:06                Details    Reading Physician Reading Date Result Priority   Gigi Lawson MD  [phone]  [phone] 1/21/2022 STAT     Narrative & Impression  EXAMINATION:  XR CHEST AP PORTABLE     CLINICAL HISTORY:  Chest Pain;     TECHNIQUE:  Single frontal view of the chest was performed.     COMPARISON:  08/02/2021     FINDINGS:  ICD remains in place with leads in similar position as compared to prior exam.  Cardiac silhouette is magnified by portable AP technique.  The heart appears to be at the upper limits of normal in size and unchanged.  Tortuous thoracic aorta.  Pulmonary vascularity is a little more prominent as compared to the previous study and may be mildly congested.  Lungs are satisfactorily expanded.  Mild interstitial accentuation bilaterally which could represent interstitial edema.  There may be a little perihilar alveolar filling on the right.  No pleural fluid or pneumothorax.  Skeletal structures appear intact.     Impression:     Pulmonary vascularity is prominent and may be mildly congested.  Interstitial accentuation bilaterally which may represent mild interstitial edema.  There may be some perihilar alveolar filling on the right as well.  Findings are concerning for mild congestive heart failure.     This report was flagged in Epic as abnormal.        Electronically signed by: Gigi Lawson MD  Date:                                            01/21/2022     Reading physician: Yohannes Cordova MD Ordering physician: Yohannes Cordova MD Study date: 1/24/22       Reason for Exam  Priority: Routine  Dx: Heart failure, systolic, acute on chronic [I50.23 (ICD-10-CM)]   Comments: Please do very early am before cath.     Result Image Hyperlink     Show images for Echo Saline Bubble? No    Summary    · The left ventricle is severely enlarged with mild eccentric hypertrophy and severely decreased systolic function.  · There are segmental left ventricular wall motion abnormalities. The  mid anteroseptal and anterior walls are akinetic. The apex is akinetic. The remainder of the left ventricular walls are severely hypokinetic.  · The estimated ejection fraction is 20%.  · Spontaneous echocardiographic contrast in the left ventricle.  · Grade III left ventricular diastolic dysfunction.  · Severe left atrial enlargement.  · Normal right ventricular size with normal right ventricular systolic function.  · Mild right atrial enlargement.  · Mild aortic regurgitation.  · Mild-to-moderate mitral regurgitation.  · Mild tricuspid regurgitation.  · The estimated PA systolic pressure is 28 mmHg.  · Intermediate central venous pressure (8 mmHg).  · Pacemaker/ICD lead.                 Assessment:     1. Ischemic cardiomyopathy    2. RBBB with left anterior fascicular block    3. Automatic implantable cardioverter-defibrillator in situ    4. Coronary artery disease involving native coronary artery of native heart without angina pectoris    5. History of coronary artery stent placement    6. History of myocardial infarction    7. Hyperlipidemia, unspecified hyperlipidemia type    8. Chronic combined systolic and diastolic congestive heart failure      Plan:   Odette was seen today for follow-up.    Diagnoses and all orders for this visit:    Ischemic cardiomyopathy    RBBB with left anterior fascicular block    Automatic implantable cardioverter-defibrillator in situ    Coronary artery disease involving native coronary artery of native heart without angina pectoris    History of coronary artery stent placement    History of myocardial infarction    Hyperlipidemia, unspecified hyperlipidemia type    Chronic combined systolic and diastolic congestive heart failure     Pt's rash appears to be improving with cessation of furosemide and valsartan and pravastatin.    CHF appears compensated    CAD is stable    HBP is controlled    Anemia and leukopenia and hypoalbuminemia are improving.    Follow up in about 2 months  (around 4/17/2022).

## 2022-02-25 ENCOUNTER — TELEPHONE (OUTPATIENT)
Dept: PRIMARY CARE CLINIC | Facility: CLINIC | Age: 67
End: 2022-02-25
Payer: MEDICARE

## 2022-02-25 ENCOUNTER — HOSPITAL ENCOUNTER (INPATIENT)
Facility: HOSPITAL | Age: 67
LOS: 2 days | Discharge: HOME | DRG: 392 | End: 2022-02-28
Attending: EMERGENCY MEDICINE | Admitting: INTERNAL MEDICINE
Payer: MEDICARE

## 2022-02-25 DIAGNOSIS — D70.8 OTHER NEUTROPENIA: ICD-10-CM

## 2022-02-25 DIAGNOSIS — I25.118 CORONARY ARTERY DISEASE OF NATIVE ARTERY OF NATIVE HEART WITH STABLE ANGINA PECTORIS: ICD-10-CM

## 2022-02-25 DIAGNOSIS — E78.01 FAMILIAL HYPERCHOLESTEROLEMIA: ICD-10-CM

## 2022-02-25 DIAGNOSIS — Z95.810 AUTOMATIC IMPLANTABLE CARDIOVERTER-DEFIBRILLATOR IN SITU: ICD-10-CM

## 2022-02-25 DIAGNOSIS — I10 PRIMARY HYPERTENSION: ICD-10-CM

## 2022-02-25 DIAGNOSIS — N32.1 COLOVESICAL FISTULA: ICD-10-CM

## 2022-02-25 DIAGNOSIS — I25.2 HISTORY OF MYOCARDIAL INFARCTION: ICD-10-CM

## 2022-02-25 DIAGNOSIS — R65.10 SIRS (SYSTEMIC INFLAMMATORY RESPONSE SYNDROME): Primary | ICD-10-CM

## 2022-02-25 DIAGNOSIS — I25.5 ISCHEMIC CARDIOMYOPATHY: ICD-10-CM

## 2022-02-25 DIAGNOSIS — R10.9 ABDOMINAL PAIN: ICD-10-CM

## 2022-02-25 DIAGNOSIS — N39.0 URINARY TRACT INFECTION WITH PYURIA: ICD-10-CM

## 2022-02-25 DIAGNOSIS — R31.9 URINARY TRACT INFECTION WITH HEMATURIA, SITE UNSPECIFIED: ICD-10-CM

## 2022-02-25 DIAGNOSIS — I50.42 CHRONIC COMBINED SYSTOLIC AND DIASTOLIC CONGESTIVE HEART FAILURE: ICD-10-CM

## 2022-02-25 DIAGNOSIS — K57.92 ACUTE DIVERTICULITIS: ICD-10-CM

## 2022-02-25 DIAGNOSIS — R07.9 CHEST PAIN: ICD-10-CM

## 2022-02-25 DIAGNOSIS — D64.9 NORMOCYTIC ANEMIA: ICD-10-CM

## 2022-02-25 DIAGNOSIS — N82.8 FISTULA OF VAGINA: ICD-10-CM

## 2022-02-25 DIAGNOSIS — Z95.5 HISTORY OF CORONARY ARTERY STENT PLACEMENT: ICD-10-CM

## 2022-02-25 DIAGNOSIS — N39.0 URINARY TRACT INFECTION WITH HEMATURIA, SITE UNSPECIFIED: ICD-10-CM

## 2022-02-25 LAB
ALBUMIN SERPL BCP-MCNC: 2.2 G/DL (ref 3.5–5.2)
ALP SERPL-CCNC: 79 U/L (ref 55–135)
ALT SERPL W/O P-5'-P-CCNC: 14 U/L (ref 10–44)
ANION GAP SERPL CALC-SCNC: 9 MMOL/L (ref 8–16)
ANISOCYTOSIS BLD QL SMEAR: SLIGHT
AST SERPL-CCNC: 22 U/L (ref 10–40)
BACTERIA #/AREA URNS AUTO: ABNORMAL /HPF
BASOPHILS # BLD AUTO: 0.04 K/UL (ref 0–0.2)
BASOPHILS NFR BLD: 1 % (ref 0–1.9)
BILIRUB SERPL-MCNC: 0.6 MG/DL (ref 0.1–1)
BILIRUB UR QL STRIP: NEGATIVE
BNP SERPL-MCNC: 1096 PG/ML (ref 0–99)
BUN SERPL-MCNC: 13 MG/DL (ref 8–23)
BURR CELLS BLD QL SMEAR: ABNORMAL
CALCIUM SERPL-MCNC: 8.5 MG/DL (ref 8.7–10.5)
CHLORIDE SERPL-SCNC: 98 MMOL/L (ref 95–110)
CLARITY UR REFRACT.AUTO: ABNORMAL
CO2 SERPL-SCNC: 24 MMOL/L (ref 23–29)
COLOR UR AUTO: ABNORMAL
CREAT SERPL-MCNC: 0.7 MG/DL (ref 0.5–1.4)
CTP QC/QA: YES
DIFFERENTIAL METHOD: ABNORMAL
EOSINOPHIL # BLD AUTO: 0 K/UL (ref 0–0.5)
EOSINOPHIL NFR BLD: 0 % (ref 0–8)
ERYTHROCYTE [DISTWIDTH] IN BLOOD BY AUTOMATED COUNT: 19.4 % (ref 11.5–14.5)
EST. GFR  (AFRICAN AMERICAN): >60 ML/MIN/1.73 M^2
EST. GFR  (NON AFRICAN AMERICAN): >60 ML/MIN/1.73 M^2
GLUCOSE SERPL-MCNC: 93 MG/DL (ref 70–110)
GLUCOSE UR QL STRIP: NEGATIVE
HCT VFR BLD AUTO: 34 % (ref 37–48.5)
HGB BLD-MCNC: 10.6 G/DL (ref 12–16)
HGB UR QL STRIP: ABNORMAL
HYALINE CASTS UR QL AUTO: 0 /LPF
HYPOCHROMIA BLD QL SMEAR: ABNORMAL
IMM GRANULOCYTES # BLD AUTO: 0.01 K/UL (ref 0–0.04)
IMM GRANULOCYTES NFR BLD AUTO: 0.3 % (ref 0–0.5)
KETONES UR QL STRIP: NEGATIVE
LACTATE SERPL-SCNC: 0.9 MMOL/L (ref 0.5–2.2)
LACTATE SERPL-SCNC: 1.6 MMOL/L (ref 0.5–2.2)
LEUKOCYTE ESTERASE UR QL STRIP: ABNORMAL
LYMPHOCYTES # BLD AUTO: 2.4 K/UL (ref 1–4.8)
LYMPHOCYTES NFR BLD: 61.3 % (ref 18–48)
MCH RBC QN AUTO: 26.2 PG (ref 27–31)
MCHC RBC AUTO-ENTMCNC: 31.2 G/DL (ref 32–36)
MCV RBC AUTO: 84 FL (ref 82–98)
MICROSCOPIC COMMENT: ABNORMAL
MONOCYTES # BLD AUTO: 1.1 K/UL (ref 0.3–1)
MONOCYTES NFR BLD: 27.9 % (ref 4–15)
NEUTROPHILS # BLD AUTO: 0.4 K/UL (ref 1.8–7.7)
NEUTROPHILS NFR BLD: 9.5 % (ref 38–73)
NITRITE UR QL STRIP: POSITIVE
NRBC BLD-RTO: 0 /100 WBC
OVALOCYTES BLD QL SMEAR: ABNORMAL
PH UR STRIP: 6 [PH] (ref 5–8)
PLATELET # BLD AUTO: 213 K/UL (ref 150–450)
PLATELET BLD QL SMEAR: ABNORMAL
PMV BLD AUTO: 10.1 FL (ref 9.2–12.9)
POIKILOCYTOSIS BLD QL SMEAR: SLIGHT
POLYCHROMASIA BLD QL SMEAR: ABNORMAL
POTASSIUM SERPL-SCNC: 4.1 MMOL/L (ref 3.5–5.1)
PROCALCITONIN SERPL IA-MCNC: 0.04 NG/ML
PROT SERPL-MCNC: 7 G/DL (ref 6–8.4)
PROT UR QL STRIP: ABNORMAL
RBC # BLD AUTO: 4.05 M/UL (ref 4–5.4)
RBC #/AREA URNS AUTO: 86 /HPF (ref 0–4)
SARS-COV-2 RDRP RESP QL NAA+PROBE: NEGATIVE
SMUDGE CELLS BLD QL SMEAR: PRESENT
SODIUM SERPL-SCNC: 131 MMOL/L (ref 136–145)
SP GR UR STRIP: 1.02 (ref 1–1.03)
SQUAMOUS #/AREA URNS AUTO: 7 /HPF
TROPONIN I SERPL DL<=0.01 NG/ML-MCNC: 0.03 NG/ML (ref 0–0.03)
URN SPEC COLLECT METH UR: ABNORMAL
WBC # BLD AUTO: 3.9 K/UL (ref 3.9–12.7)
WBC #/AREA URNS AUTO: >100 /HPF (ref 0–5)
WBC CLUMPS UR QL AUTO: ABNORMAL

## 2022-02-25 PROCEDURE — 83605 ASSAY OF LACTIC ACID: CPT | Mod: 91 | Performed by: STUDENT IN AN ORGANIZED HEALTH CARE EDUCATION/TRAINING PROGRAM

## 2022-02-25 PROCEDURE — 85025 COMPLETE CBC W/AUTO DIFF WBC: CPT | Performed by: STUDENT IN AN ORGANIZED HEALTH CARE EDUCATION/TRAINING PROGRAM

## 2022-02-25 PROCEDURE — 81001 URINALYSIS AUTO W/SCOPE: CPT | Performed by: STUDENT IN AN ORGANIZED HEALTH CARE EDUCATION/TRAINING PROGRAM

## 2022-02-25 PROCEDURE — 96375 TX/PRO/DX INJ NEW DRUG ADDON: CPT

## 2022-02-25 PROCEDURE — 80053 COMPREHEN METABOLIC PANEL: CPT | Performed by: STUDENT IN AN ORGANIZED HEALTH CARE EDUCATION/TRAINING PROGRAM

## 2022-02-25 PROCEDURE — 96360 HYDRATION IV INFUSION INIT: CPT | Mod: 59

## 2022-02-25 PROCEDURE — 63600175 PHARM REV CODE 636 W HCPCS: Performed by: STUDENT IN AN ORGANIZED HEALTH CARE EDUCATION/TRAINING PROGRAM

## 2022-02-25 PROCEDURE — 87086 URINE CULTURE/COLONY COUNT: CPT | Performed by: STUDENT IN AN ORGANIZED HEALTH CARE EDUCATION/TRAINING PROGRAM

## 2022-02-25 PROCEDURE — 99285 EMERGENCY DEPT VISIT HI MDM: CPT | Mod: 25

## 2022-02-25 PROCEDURE — 93010 EKG 12-LEAD: ICD-10-PCS | Mod: ,,, | Performed by: INTERNAL MEDICINE

## 2022-02-25 PROCEDURE — U0002 COVID-19 LAB TEST NON-CDC: HCPCS | Performed by: STUDENT IN AN ORGANIZED HEALTH CARE EDUCATION/TRAINING PROGRAM

## 2022-02-25 PROCEDURE — 96361 HYDRATE IV INFUSION ADD-ON: CPT

## 2022-02-25 PROCEDURE — 87040 BLOOD CULTURE FOR BACTERIA: CPT | Performed by: STUDENT IN AN ORGANIZED HEALTH CARE EDUCATION/TRAINING PROGRAM

## 2022-02-25 PROCEDURE — 93005 ELECTROCARDIOGRAM TRACING: CPT

## 2022-02-25 PROCEDURE — 25000003 PHARM REV CODE 250: Performed by: STUDENT IN AN ORGANIZED HEALTH CARE EDUCATION/TRAINING PROGRAM

## 2022-02-25 PROCEDURE — 25500020 PHARM REV CODE 255: Performed by: EMERGENCY MEDICINE

## 2022-02-25 PROCEDURE — 99285 PR EMERGENCY DEPT VISIT,LEVEL V: ICD-10-PCS | Mod: GC,CS,, | Performed by: EMERGENCY MEDICINE

## 2022-02-25 PROCEDURE — 96365 THER/PROPH/DIAG IV INF INIT: CPT

## 2022-02-25 PROCEDURE — 83880 ASSAY OF NATRIURETIC PEPTIDE: CPT | Performed by: STUDENT IN AN ORGANIZED HEALTH CARE EDUCATION/TRAINING PROGRAM

## 2022-02-25 PROCEDURE — 93010 ELECTROCARDIOGRAM REPORT: CPT | Mod: ,,, | Performed by: INTERNAL MEDICINE

## 2022-02-25 PROCEDURE — 84484 ASSAY OF TROPONIN QUANT: CPT | Performed by: STUDENT IN AN ORGANIZED HEALTH CARE EDUCATION/TRAINING PROGRAM

## 2022-02-25 PROCEDURE — 99285 EMERGENCY DEPT VISIT HI MDM: CPT | Mod: GC,CS,, | Performed by: EMERGENCY MEDICINE

## 2022-02-25 PROCEDURE — 84145 PROCALCITONIN (PCT): CPT | Performed by: STUDENT IN AN ORGANIZED HEALTH CARE EDUCATION/TRAINING PROGRAM

## 2022-02-25 RX ORDER — ONDANSETRON 2 MG/ML
4 INJECTION INTRAMUSCULAR; INTRAVENOUS
Status: COMPLETED | OUTPATIENT
Start: 2022-02-25 | End: 2022-02-25

## 2022-02-25 RX ORDER — CEFEPIME HYDROCHLORIDE 1 G/50ML
2 INJECTION, SOLUTION INTRAVENOUS
Status: COMPLETED | OUTPATIENT
Start: 2022-02-25 | End: 2022-02-25

## 2022-02-25 RX ADMIN — ONDANSETRON 4 MG: 2 INJECTION INTRAMUSCULAR; INTRAVENOUS at 05:02

## 2022-02-25 RX ADMIN — SODIUM CHLORIDE 500 ML: 0.9 INJECTION, SOLUTION INTRAVENOUS at 11:02

## 2022-02-25 RX ADMIN — SODIUM CHLORIDE 250 ML: 0.9 INJECTION, SOLUTION INTRAVENOUS at 05:02

## 2022-02-25 RX ADMIN — SODIUM CHLORIDE 500 ML: 0.9 INJECTION, SOLUTION INTRAVENOUS at 06:02

## 2022-02-25 RX ADMIN — IOHEXOL 30 ML: 350 INJECTION, SOLUTION INTRAVENOUS at 09:02

## 2022-02-25 RX ADMIN — CEFEPIME HYDROCHLORIDE 2 G: 2 INJECTION, SOLUTION INTRAVENOUS at 05:02

## 2022-02-25 NOTE — ED TRIAGE NOTES
Odette Hart, a 66 y.o. female presents to the ED w/ complaint of abdominal pain for the past 2 days. Pt states that she has had foul, green discharge from vagina and has noted passing air when urinating. Pt states that yesterday she noted stool when wiping after urinating. Denies chest pain. Reports SOB on exertion. Reports vomiting.     Triage note:  Chief Complaint   Patient presents with    Abdominal Pain     X 2 days; pt reports foul discharge from vagina. She has a diagnosed fistula.      Review of patient's allergies indicates:   Allergen Reactions    Augmentin [amoxicillin-pot clavulanate] Swelling     Not allergic to amoxicillin just a derivative of augmentin    Lisinopril Other (See Comments)     Fluid around heart    Losartan Other (See Comments)     High potassium    Shellfish containing products Anaphylaxis     Pt.states she is allergic to SEAFOOD since the age of 13    Levaquin [levofloxacin] Other (See Comments)     Very bad joint pain    Clindamycin Palpitations     Chest pain     Past Medical History:   Diagnosis Date    Acute coronary syndrome     Allergy     Arthritis     Cardiomyopathy     CHF (congestive heart failure)     Coronary artery disease     Diverticulitis     Diverticulosis     Former smoker 1/24/2022    Heart attack     Heart disease     History of myocardial infarction 1/24/2022    Hyperlipidemia     Hypertension     Hypertension 1/24/2022    ICD (implantable cardioverter-defibrillator) in place      Patient identifiers verified and correct for Odette Hart    LOC: The patient is awake, alert, and aware of environment. The patient is AOX4 and speaking appropriately.   APPEARANCE: No acute distress noted.  HEENT: WDL, PERRLA  PSYCHOSOCIAL: Patient is calm and cooperative. Denies SI/HI.  SKIN: The skin is warm, dry, color consistent with ethnicity. No breakdown or brusing visible.  RESPIRATORY: Airway is open and patent. Bilateral chest rise and fall.  Respiratory rate even and unlabored.  No accessory muscle use noted.  CARDIAC: Patient has a normal rate and rhythm. No complaints of chest pain.  ABDOMEN/GI: Soft, non tender. No distention noted. Reports nausea and vomiting.  URINARY:  Voids independently without difficulty. No complaints of frequency, urgency, burning, or blood in urine. Reports stool when wiping after urination.  NEUROLOGIC: Eyes open spontaneously. Speech clear.  Able to follow commands, demonstrating ability to actively and appropriately communicate within context of current conversation. Symmetrical facial muscles. Movement is purposeful. Denies dizziness/lightheadedness.  MUSCULOSKELETAL: No obvious deformities noted. Generalized weakness noted.  PERIPHERAL VASCULAR: Cap refill <3 secs bilaterally. No peripheral edema noted. Denies numbness and tingling in extremities.

## 2022-02-25 NOTE — ED PROVIDER NOTES
Encounter Date: 2/25/2022       History     Chief Complaint   Patient presents with    Abdominal Pain     X 2 days; pt reports foul discharge from vagina. She has a diagnosed fistula.      66-year-old female with PMH of CHF (EF 30%), ACS, hypertension, ICD placement, and diverticulitis presents with abdominal pain and vaginal discharge.  Patient states that she has had lower abdominal pain for the past 2 days that was acute onset, intermittent, not currently present, 5/10 intensity, associated with nausea and multiple episodes of nonbloody vomiting.  Patient also reports that she has had a foul-smelling, green discharge from her vagina and notes that she is passing air when she urinates.  She reports that she thinks she had a fistula in the past.  She also reports dysuria.  She also reports that when she wiped after urinating, there was stool on the toilet paper.  She denies chest pain, shortness of breath, hematuria, diarrhea, constipation, and black/bloody stools.  Reports a fever of 102° at home yesterday.  History of tobacco use and denies alcohol and recreational drug use. She takes a beta blocker.     The history is provided by the patient.     Review of patient's allergies indicates:   Allergen Reactions    Augmentin [amoxicillin-pot clavulanate] Swelling     Not allergic to amoxicillin just a derivative of augmentin    Lisinopril Other (See Comments)     Fluid around heart    Losartan Other (See Comments)     High potassium    Shellfish containing products Anaphylaxis     Pt.states she is allergic to SEAFOOD since the age of 13    Levaquin [levofloxacin] Other (See Comments)     Very bad joint pain    Clindamycin Palpitations     Chest pain     Past Medical History:   Diagnosis Date    Acute coronary syndrome     Allergy     Arthritis     Cardiomyopathy     CHF (congestive heart failure)     Coronary artery disease     Diverticulitis     Diverticulosis     Former smoker 1/24/2022    Heart  attack     Heart disease     History of myocardial infarction 2022    Hyperlipidemia     Hypertension     Hypertension 2022    ICD (implantable cardioverter-defibrillator) in place      Past Surgical History:   Procedure Laterality Date    ATRIAL CARDIAC PACEMAKER INSERTION       SECTION      CORONARY STENT PLACEMENT      LEFT HEART CATHETERIZATION Left 2022    Procedure: CATHETERIZATION, HEART, LEFT;  Surgeon: Yohannes Cordova MD;  Location: [facility] CATH LAB;  Service: Cardiology;  Laterality: Left;     Family History   Problem Relation Age of Onset    Heart disease Mother     Emphysema Father     Heart attack Brother      Social History     Tobacco Use    Smoking status: Former Smoker     Packs/day: 0.50     Start date: 1976     Quit date: 2012     Years since quittin.2    Smokeless tobacco: Never Used   Substance Use Topics    Alcohol use: No    Drug use: No     Review of Systems   Constitutional: Positive for chills, fatigue and fever.   HENT: Negative for congestion and sinus pain.    Eyes: Negative for pain and visual disturbance.   Respiratory: Negative for cough and shortness of breath.    Cardiovascular: Negative for chest pain and leg swelling.   Gastrointestinal: Positive for abdominal pain, nausea and vomiting. Negative for blood in stool, constipation and diarrhea.   Endocrine: Negative for polydipsia and polyuria.   Genitourinary: Positive for dysuria and vaginal discharge. Negative for hematuria.   Musculoskeletal: Negative for arthralgias and back pain.   Skin: Negative for color change and rash.   Neurological: Negative for dizziness, light-headedness and headaches.       Physical Exam     Initial Vitals   BP Pulse Resp Temp SpO2   22 1642 22 1642 22 1642 22 1801 22 1651   (!) 100/56 78 17 98.5 °F (36.9 °C) 99 %      MAP       --                Physical Exam    Nursing note and vitals reviewed.  Constitutional: She  appears well-developed and well-nourished. She is not diaphoretic. No distress.   HENT:   Head: Normocephalic and atraumatic.   Eyes: Conjunctivae and EOM are normal. Pupils are equal, round, and reactive to light.   Neck: Neck supple.   Normal range of motion.  Cardiovascular: Normal rate, regular rhythm, normal heart sounds and intact distal pulses.   No murmur heard.  Pulmonary/Chest: Breath sounds normal. No respiratory distress. She has no wheezes. She has no rhonchi. She has no rales.   No increased WOB or tachypnea   Abdominal: Abdomen is soft. She exhibits no distension.   Mild lower abdominal TTP without rebound or guarding There is no rebound and no guarding.   Genitourinary:    Genitourinary Comments: Pt declined pelvic exam      Musculoskeletal:         General: No tenderness or edema.      Cervical back: Normal range of motion and neck supple.     Neurological: She is alert and oriented to person, place, and time. She has normal strength. No sensory deficit.   Skin: Skin is warm and dry. Capillary refill takes less than 2 seconds.         ED Course   Procedures  Labs Reviewed   CBC W/ AUTO DIFFERENTIAL - Abnormal; Notable for the following components:       Result Value    Hemoglobin 10.6 (*)     Hematocrit 34.0 (*)     MCH 26.2 (*)     MCHC 31.2 (*)     RDW 19.4 (*)     Gran # (ANC) 0.4 (*)     Mono # 1.1 (*)     Gran % 9.5 (*)     Lymph % 61.3 (*)     Mono % 27.9 (*)     All other components within normal limits   COMPREHENSIVE METABOLIC PANEL - Abnormal; Notable for the following components:    Sodium 131 (*)     Calcium 8.5 (*)     Albumin 2.2 (*)     All other components within normal limits   URINALYSIS, REFLEX TO URINE CULTURE - Abnormal; Notable for the following components:    Appearance, UA Cloudy (*)     Protein, UA 2+ (*)     Occult Blood UA 1+ (*)     Nitrite, UA Positive (*)     Leukocytes, UA 2+ (*)     All other components within normal limits    Narrative:     Specimen Source->Urine    B-TYPE NATRIURETIC PEPTIDE - Abnormal; Notable for the following components:    BNP 1,096 (*)     All other components within normal limits   TROPONIN I - Abnormal; Notable for the following components:    Troponin I 0.032 (*)     All other components within normal limits   URINALYSIS MICROSCOPIC - Abnormal; Notable for the following components:    RBC, UA 86 (*)     WBC, UA >100 (*)     WBC Clumps, UA Many (*)     Bacteria Moderate (*)     All other components within normal limits    Narrative:     Specimen Source->Urine   SARS-COV-2 RDRP GENE - Normal    Narrative:     This test utilizes isothermal nucleic acid amplification   technology to detect the SARS-CoV-2 RdRp nucleic acid segment.   The analytical sensitivity (limit of detection) is 125 genome   equivalents/mL.   A POSITIVE result implies infection with the SARS-CoV-2 virus;   the patient is presumed to be contagious.     A NEGATIVE result means that SARS-CoV-2 nucleic acids are not   present above the limit of detection. A NEGATIVE result should be   treated as presumptive. It does not rule out the possibility of   COVID-19 and should not be the sole basis for treatment decisions.   If COVID-19 is strongly suspected based on clinical and exposure   history, re-testing using an alternate molecular assay should be   considered.   This test is only for use under the Food and Drug   Administration s Emergency Use Authorization (EUA).   Commercial kits are provided by [employer].   Performance characteristics of the EUA have been independently   verified by [facility] Department of   Pathology and Laboratory Medicine.   _________________________________________________________________   The authorized Fact Sheet for Healthcare Providers and the authorized Fact   Sheet for Patients of the ID NOW COVID-19 are available on the FDA   website:     https://www.fda.gov/media/531494/download  https://www.fda.gov/media/931343/download           CULTURE, BLOOD   CULTURE, BLOOD   CULTURE, URINE   LACTIC ACID, PLASMA   PROCALCITONIN   LACTIC ACID, PLASMA   LACTIC ACID, PLASMA     EKG Readings: (Independently Interpreted)   Previous EKG: Compared with most recent EKG   EKG shows normal sinus rhythm at a rate of 62, ST elevations in V1, V2, and V3 in addition to possibly in 2 and 3, consistent with prior EKGs.  No reciprocal depressions.  Not consistent with STEMI.       Imaging Results          CT Abdomen Pelvis With Contrast (In process)                X-Ray Chest AP Portable (Final result)  Result time 02/25/22 17:39:16    Final result by Doug Caballero MD (02/25/22 17:39:16)                 Impression:      Cardiac device without detrimental change or radiographic acute intrathoracic process seen on this single view.  Specifically, no consolidation.      Electronically signed by: Doug Caballero MD  Date:    02/25/2022  Time:    17:39             Narrative:    EXAMINATION:  XR CHEST AP PORTABLE    CLINICAL HISTORY:  Sepsis;    TECHNIQUE:  Single frontal view of the chest was performed.    COMPARISON:  Chest radiograph 02/01/2022    FINDINGS:  Monitoring leads overlie the chest.  No detrimental change.  Left chest dual lead cardiac device is stable.  Cardiomediastinal silhouette is midline and stable without evidence of failure.  Coronary arterial stent versus calcification unchanged.  Pulmonary vasculature and hilar contours are within normal limits.    The lungs are well expanded without large consolidation, pleural effusion or pneumothorax noting bibasilar minimal platelike scarring versus atelectasis.  No acute osseous process seen.  PA and lateral views can be obtained.                                 Medications   sodium chloride 0.9% bolus 250 mL (0 mLs Intravenous Stopped 2/25/22 1806)   cefepime in dextrose 5 % IVPB 2 g (0 g Intravenous Stopped 2/25/22 1850)   ondansetron injection 4 mg (4 mg Intravenous Given 2/25/22 1722)   sodium chloride 0.9%  "bolus 500 mL (0 mLs Intravenous Stopped 2/25/22 1924)   iohexoL (OMNIPAQUE 350) injection 30 mL (30 mLs Rectal Given 2/25/22 2158)     Medical Decision Making:   History:   Old Medical Records: I decided to obtain old medical records.  Initial Assessment:   66-year-old female with PMH of CHF (EF 30%), ACS, hypertension, ICD placement, and diverticulitis presents with a 2 day history of progressive lower abdominal pain associated with dysuria and vaginal discharge but the patient thinks this stool, febrile at home to 102° but afebrile in triage, hypertensive to 100/56 initially, exam remarkable for lower abdominal tenderness to palpation.  Will initiate septic workup.  Patient's EF is 30% so I will not administer 30 cc/kg bolus.  Will initiate with 750 cc normal saline.  Will cover with Zosyn initially for intra-abdominal/enterococcus coverage.  Differential Diagnosis:   Sepsis, UTI, bacteremia, pneumonia, diverticulitis, colitis, appendicitis, vaginitis, PID, ACS  Clinical Tests:   Lab Tests: Ordered and Reviewed  Radiological Study: Ordered and Reviewed  Medical Tests: Ordered and Reviewed  Sepsis Perfusion Assessment: "I attest a sepsis perfusion exam was performed within 6 hours of sepsis, severe sepsis, or septic shock presentation, following fluid resuscitation."    Sepsis Perfusion Assessment Complete: 2/25/2022 8:16 PM    ED Management:  See ED course             ED Course as of 02/25/22 2239 Fri Feb 25, 2022 1856 Troponin I(!): 0.032  Elevated but less than prior [BD]   1856 BNP(!): 1,096  More elevated than prior.  Patient denies any shortness of breath, chest pain, waking, or peripheral edema.  Unclear etiology of this elevation at this time [BD]   1857 Comprehensive metabolic panel(!)  CMP shows mild hyponatremia of 131 without impaired renal function or elevated LFTs [BD]   1857 CBC auto differential(!)  CBC unremarkable without leukocytosis, significant anemia, or decreased platelets   [BD]   1857 " Procalcitonin: 0.04 [BD]   1857 SARS-CoV-2 RNA, Amplification, Qual: Negative [BD]   1857 Lactate, Robbie: 1.6 [BD]   1857 X-Ray Chest AP Portable  Chest x-ray shows no acute process such as pneumonia, pneumothorax, or pulmonary edema.    [BD]   1949 Urinalysis, Reflex to Urine Culture Urine, Clean Catch(!)  UA consistent with UTI for which patient is already receiving antibiotics [BD]   2132 Lactate, Robbie: 0.9 [BD]   2239 Resident care handed off to Dr. Mendez. CT still pending [BD]      ED Course User Index  [BD] Chris Monk MD             Clinical Impression:   Final diagnoses:  [R10.9] Abdominal pain  [R65.10] SIRS (systemic inflammatory response syndrome)  [N39.0, R31.9] Urinary tract infection with hematuria, site unspecified (Primary)          ED Disposition Condition    Admit               Chris Monk MD  Resident  02/25/22 5312

## 2022-02-25 NOTE — TELEPHONE ENCOUNTER
Returned patients call. I advised patient to go to the ER and seek medical attention ASAP. Patient stated that the ER will not do anything for her. She said she needs Dr. Barragan to send her to the person who can help her condition. Put pt on hold and spoke with Dr. Barragan and he advised to send patient to go see Dr. Guerra. I put in referral and routed it to Dr. Barragan. I told pt what he advised. Patient understood and said she will schedule as soon as they call.

## 2022-02-25 NOTE — TELEPHONE ENCOUNTER
----- Message from Daphnie Kinney sent at 2/25/2022 11:50 AM CST -----  Needs advice from nurse:      Who Called:pt  Regarding:patient is running fever of 102 due to possible bladder issues/she has a fistula and is having feces coming through her vagina/having abdominal pain/cannot wait until 3/3 for an appt  Would the patient rather a call back or VIA [facility]?  Best Call Back number:[phone]  Additional Info:

## 2022-02-26 ENCOUNTER — EXTERNAL HOME HEALTH (OUTPATIENT)
Dept: HOME HEALTH SERVICES | Facility: HOSPITAL | Age: 67
End: 2022-02-26
Payer: MEDICARE

## 2022-02-26 ENCOUNTER — TELEPHONE (OUTPATIENT)
Dept: PRIMARY CARE CLINIC | Facility: CLINIC | Age: 67
End: 2022-02-26
Payer: MEDICARE

## 2022-02-26 PROBLEM — E78.01 FAMILIAL HYPERCHOLESTEROLEMIA: Status: ACTIVE | Noted: 2022-01-22

## 2022-02-26 PROBLEM — N32.1 COLOVESICAL FISTULA: Status: ACTIVE | Noted: 2022-02-26

## 2022-02-26 PROBLEM — D72.819 LEUKOPENIA: Status: ACTIVE | Noted: 2022-02-26

## 2022-02-26 PROBLEM — I25.118 CORONARY ARTERY DISEASE OF NATIVE ARTERY OF NATIVE HEART WITH STABLE ANGINA PECTORIS: Status: ACTIVE | Noted: 2021-04-19

## 2022-02-26 PROBLEM — R65.20 SEVERE SEPSIS: Status: ACTIVE | Noted: 2022-02-26

## 2022-02-26 PROBLEM — N39.0 UTI (URINARY TRACT INFECTION): Status: ACTIVE | Noted: 2022-02-26

## 2022-02-26 PROBLEM — I21.4 NON-ST ELEVATION MYOCARDIAL INFARCTION (NSTEMI): Status: RESOLVED | Noted: 2019-02-04 | Resolved: 2022-02-26

## 2022-02-26 PROBLEM — E78.5 HLD (HYPERLIPIDEMIA): Status: RESOLVED | Noted: 2022-01-22 | Resolved: 2022-02-26

## 2022-02-26 PROBLEM — R82.81 PYURIA: Status: ACTIVE | Noted: 2022-02-26

## 2022-02-26 PROBLEM — A41.9 SEVERE SEPSIS: Status: ACTIVE | Noted: 2022-02-26

## 2022-02-26 PROBLEM — E78.2 MIXED HYPERLIPIDEMIA: Status: ACTIVE | Noted: 2022-01-22

## 2022-02-26 PROBLEM — K57.92 ACUTE DIVERTICULITIS: Status: ACTIVE | Noted: 2021-01-12

## 2022-02-26 LAB
ABO + RH BLD: NORMAL
ALBUMIN SERPL BCP-MCNC: 2.1 G/DL (ref 3.5–5.2)
ALP SERPL-CCNC: 70 U/L (ref 55–135)
ALT SERPL W/O P-5'-P-CCNC: 14 U/L (ref 10–44)
ANION GAP SERPL CALC-SCNC: 6 MMOL/L (ref 8–16)
ANISOCYTOSIS BLD QL SMEAR: SLIGHT
AST SERPL-CCNC: 18 U/L (ref 10–40)
BACTERIA UR CULT: NORMAL
BACTERIA UR CULT: NORMAL
BASOPHILS # BLD AUTO: 0.04 K/UL (ref 0–0.2)
BASOPHILS NFR BLD: 1.3 % (ref 0–1.9)
BILIRUB SERPL-MCNC: 0.5 MG/DL (ref 0.1–1)
BLD GP AB SCN CELLS X3 SERPL QL: NORMAL
BUN SERPL-MCNC: 9 MG/DL (ref 8–23)
CALCIUM SERPL-MCNC: 8.3 MG/DL (ref 8.7–10.5)
CHLORIDE SERPL-SCNC: 106 MMOL/L (ref 95–110)
CO2 SERPL-SCNC: 24 MMOL/L (ref 23–29)
CREAT SERPL-MCNC: 0.6 MG/DL (ref 0.5–1.4)
DIFFERENTIAL METHOD: ABNORMAL
EOSINOPHIL # BLD AUTO: 0 K/UL (ref 0–0.5)
EOSINOPHIL NFR BLD: 0 % (ref 0–8)
ERYTHROCYTE [DISTWIDTH] IN BLOOD BY AUTOMATED COUNT: 19.7 % (ref 11.5–14.5)
EST. GFR  (AFRICAN AMERICAN): >60 ML/MIN/1.73 M^2
EST. GFR  (NON AFRICAN AMERICAN): >60 ML/MIN/1.73 M^2
GLUCOSE SERPL-MCNC: 87 MG/DL (ref 70–110)
HCT VFR BLD AUTO: 32.3 % (ref 37–48.5)
HGB BLD-MCNC: 10.1 G/DL (ref 12–16)
IMM GRANULOCYTES # BLD AUTO: 0 K/UL (ref 0–0.04)
IMM GRANULOCYTES NFR BLD AUTO: 0 % (ref 0–0.5)
INR PPP: 1.1 (ref 0.8–1.2)
LYMPHOCYTES # BLD AUTO: 1.9 K/UL (ref 1–4.8)
LYMPHOCYTES NFR BLD: 59.9 % (ref 18–48)
MAGNESIUM SERPL-MCNC: 1.8 MG/DL (ref 1.6–2.6)
MCH RBC QN AUTO: 26.2 PG (ref 27–31)
MCHC RBC AUTO-ENTMCNC: 31.3 G/DL (ref 32–36)
MCV RBC AUTO: 84 FL (ref 82–98)
MONOCYTES # BLD AUTO: 0.8 K/UL (ref 0.3–1)
MONOCYTES NFR BLD: 26.3 % (ref 4–15)
NEUTROPHILS # BLD AUTO: 0.4 K/UL (ref 1.8–7.7)
NEUTROPHILS NFR BLD: 12.5 % (ref 38–73)
NRBC BLD-RTO: 0 /100 WBC
PHOSPHATE SERPL-MCNC: 3 MG/DL (ref 2.7–4.5)
PLATELET # BLD AUTO: 207 K/UL (ref 150–450)
PLATELET BLD QL SMEAR: ABNORMAL
PMV BLD AUTO: 10.2 FL (ref 9.2–12.9)
POTASSIUM SERPL-SCNC: 4 MMOL/L (ref 3.5–5.1)
PROT SERPL-MCNC: 6.7 G/DL (ref 6–8.4)
PROTHROMBIN TIME: 11.9 SEC (ref 9–12.5)
RBC # BLD AUTO: 3.85 M/UL (ref 4–5.4)
SODIUM SERPL-SCNC: 136 MMOL/L (ref 136–145)
TOXIC GRANULES BLD QL SMEAR: PRESENT
TROPONIN I SERPL DL<=0.01 NG/ML-MCNC: 0.02 NG/ML (ref 0–0.03)
WBC # BLD AUTO: 3.19 K/UL (ref 3.9–12.7)

## 2022-02-26 PROCEDURE — 85025 COMPLETE CBC W/AUTO DIFF WBC: CPT | Performed by: STUDENT IN AN ORGANIZED HEALTH CARE EDUCATION/TRAINING PROGRAM

## 2022-02-26 PROCEDURE — 96361 HYDRATE IV INFUSION ADD-ON: CPT

## 2022-02-26 PROCEDURE — 96376 TX/PRO/DX INJ SAME DRUG ADON: CPT

## 2022-02-26 PROCEDURE — 25000003 PHARM REV CODE 250: Performed by: INTERNAL MEDICINE

## 2022-02-26 PROCEDURE — 36415 COLL VENOUS BLD VENIPUNCTURE: CPT | Performed by: INTERNAL MEDICINE

## 2022-02-26 PROCEDURE — 96375 TX/PRO/DX INJ NEW DRUG ADDON: CPT

## 2022-02-26 PROCEDURE — 25000003 PHARM REV CODE 250: Performed by: STUDENT IN AN ORGANIZED HEALTH CARE EDUCATION/TRAINING PROGRAM

## 2022-02-26 PROCEDURE — 86901 BLOOD TYPING SEROLOGIC RH(D): CPT | Performed by: STUDENT IN AN ORGANIZED HEALTH CARE EDUCATION/TRAINING PROGRAM

## 2022-02-26 PROCEDURE — 84484 ASSAY OF TROPONIN QUANT: CPT | Performed by: STUDENT IN AN ORGANIZED HEALTH CARE EDUCATION/TRAINING PROGRAM

## 2022-02-26 PROCEDURE — 20600001 HC STEP DOWN PRIVATE ROOM

## 2022-02-26 PROCEDURE — 63600175 PHARM REV CODE 636 W HCPCS: Performed by: INTERNAL MEDICINE

## 2022-02-26 PROCEDURE — 85610 PROTHROMBIN TIME: CPT | Performed by: STUDENT IN AN ORGANIZED HEALTH CARE EDUCATION/TRAINING PROGRAM

## 2022-02-26 PROCEDURE — 63600175 PHARM REV CODE 636 W HCPCS: Performed by: STUDENT IN AN ORGANIZED HEALTH CARE EDUCATION/TRAINING PROGRAM

## 2022-02-26 PROCEDURE — 80053 COMPREHEN METABOLIC PANEL: CPT | Performed by: STUDENT IN AN ORGANIZED HEALTH CARE EDUCATION/TRAINING PROGRAM

## 2022-02-26 PROCEDURE — 83735 ASSAY OF MAGNESIUM: CPT | Performed by: STUDENT IN AN ORGANIZED HEALTH CARE EDUCATION/TRAINING PROGRAM

## 2022-02-26 PROCEDURE — 99223 PR INITIAL HOSPITAL CARE,LEVL III: ICD-10-PCS | Mod: AI,GC,, | Performed by: INTERNAL MEDICINE

## 2022-02-26 PROCEDURE — 86900 BLOOD TYPING SEROLOGIC ABO: CPT | Performed by: STUDENT IN AN ORGANIZED HEALTH CARE EDUCATION/TRAINING PROGRAM

## 2022-02-26 PROCEDURE — 84100 ASSAY OF PHOSPHORUS: CPT | Performed by: STUDENT IN AN ORGANIZED HEALTH CARE EDUCATION/TRAINING PROGRAM

## 2022-02-26 PROCEDURE — 99223 1ST HOSP IP/OBS HIGH 75: CPT | Mod: AI,GC,, | Performed by: INTERNAL MEDICINE

## 2022-02-26 RX ORDER — ONDANSETRON 2 MG/ML
4 INJECTION INTRAMUSCULAR; INTRAVENOUS
Status: COMPLETED | OUTPATIENT
Start: 2022-02-26 | End: 2022-02-26

## 2022-02-26 RX ORDER — IBUPROFEN 200 MG
16 TABLET ORAL
Status: DISCONTINUED | OUTPATIENT
Start: 2022-02-26 | End: 2022-02-28 | Stop reason: HOSPADM

## 2022-02-26 RX ORDER — GLUCAGON 1 MG
1 KIT INJECTION
Status: DISCONTINUED | OUTPATIENT
Start: 2022-02-26 | End: 2022-02-28 | Stop reason: HOSPADM

## 2022-02-26 RX ORDER — OXYCODONE HYDROCHLORIDE 5 MG/1
5 TABLET ORAL EVERY 6 HOURS PRN
Status: DISCONTINUED | OUTPATIENT
Start: 2022-02-26 | End: 2022-02-28 | Stop reason: HOSPADM

## 2022-02-26 RX ORDER — ACETAMINOPHEN 325 MG/1
650 TABLET ORAL EVERY 6 HOURS PRN
Status: DISCONTINUED | OUTPATIENT
Start: 2022-02-26 | End: 2022-02-28 | Stop reason: HOSPADM

## 2022-02-26 RX ORDER — ASPIRIN 81 MG/1
81 TABLET ORAL DAILY
Status: DISCONTINUED | OUTPATIENT
Start: 2022-02-26 | End: 2022-02-28 | Stop reason: HOSPADM

## 2022-02-26 RX ORDER — IBUPROFEN 200 MG
24 TABLET ORAL
Status: DISCONTINUED | OUTPATIENT
Start: 2022-02-26 | End: 2022-02-28 | Stop reason: HOSPADM

## 2022-02-26 RX ORDER — ALBUTEROL SULFATE 90 UG/1
2 AEROSOL, METERED RESPIRATORY (INHALATION) EVERY 6 HOURS PRN
Status: DISCONTINUED | OUTPATIENT
Start: 2022-02-26 | End: 2022-02-28 | Stop reason: HOSPADM

## 2022-02-26 RX ORDER — PRASUGREL 10 MG/1
10 TABLET, FILM COATED ORAL DAILY
Status: DISCONTINUED | OUTPATIENT
Start: 2022-02-26 | End: 2022-02-28 | Stop reason: HOSPADM

## 2022-02-26 RX ORDER — CIPROFLOXACIN 500 MG/1
500 TABLET ORAL EVERY 12 HOURS
Status: DISCONTINUED | OUTPATIENT
Start: 2022-02-26 | End: 2022-02-26

## 2022-02-26 RX ORDER — KETOROLAC TROMETHAMINE 15 MG/ML
15 INJECTION, SOLUTION INTRAMUSCULAR; INTRAVENOUS EVERY 6 HOURS PRN
Status: DISCONTINUED | OUTPATIENT
Start: 2022-02-26 | End: 2022-02-26

## 2022-02-26 RX ORDER — METRONIDAZOLE 500 MG/1
500 TABLET ORAL EVERY 8 HOURS
Status: DISCONTINUED | OUTPATIENT
Start: 2022-02-26 | End: 2022-02-28 | Stop reason: HOSPADM

## 2022-02-26 RX ORDER — NALOXONE HCL 0.4 MG/ML
0.02 VIAL (ML) INJECTION
Status: DISCONTINUED | OUTPATIENT
Start: 2022-02-26 | End: 2022-02-28 | Stop reason: HOSPADM

## 2022-02-26 RX ORDER — PANTOPRAZOLE SODIUM 40 MG/1
40 TABLET, DELAYED RELEASE ORAL DAILY
Refills: 2 | Status: DISCONTINUED | OUTPATIENT
Start: 2022-02-26 | End: 2022-02-28 | Stop reason: HOSPADM

## 2022-02-26 RX ORDER — CEFEPIME HYDROCHLORIDE 1 G/50ML
1 INJECTION, SOLUTION INTRAVENOUS
Status: DISCONTINUED | OUTPATIENT
Start: 2022-02-26 | End: 2022-02-28 | Stop reason: HOSPADM

## 2022-02-26 RX ORDER — KETOROLAC TROMETHAMINE 30 MG/ML
10 INJECTION, SOLUTION INTRAMUSCULAR; INTRAVENOUS
Status: COMPLETED | OUTPATIENT
Start: 2022-02-26 | End: 2022-02-26

## 2022-02-26 RX ORDER — SODIUM CHLORIDE 0.9 % (FLUSH) 0.9 %
10 SYRINGE (ML) INJECTION EVERY 12 HOURS PRN
Status: DISCONTINUED | OUTPATIENT
Start: 2022-02-26 | End: 2022-02-28 | Stop reason: HOSPADM

## 2022-02-26 RX ORDER — EZETIMIBE 10 MG/1
10 TABLET ORAL NIGHTLY
Status: DISCONTINUED | OUTPATIENT
Start: 2022-02-26 | End: 2022-02-28 | Stop reason: HOSPADM

## 2022-02-26 RX ADMIN — Medication 1 CAPSULE: at 09:02

## 2022-02-26 RX ADMIN — ONDANSETRON 4 MG: 2 INJECTION INTRAMUSCULAR; INTRAVENOUS at 01:02

## 2022-02-26 RX ADMIN — SODIUM CHLORIDE 500 ML: 0.9 INJECTION, SOLUTION INTRAVENOUS at 08:02

## 2022-02-26 RX ADMIN — CIPROFLOXACIN 500 MG: 500 TABLET, FILM COATED ORAL at 03:02

## 2022-02-26 RX ADMIN — METRONIDAZOLE 500 MG: 500 TABLET ORAL at 09:02

## 2022-02-26 RX ADMIN — KETOROLAC TROMETHAMINE 15 MG: 15 INJECTION, SOLUTION INTRAMUSCULAR; INTRAVENOUS at 04:02

## 2022-02-26 RX ADMIN — METRONIDAZOLE 500 MG: 500 TABLET ORAL at 03:02

## 2022-02-26 RX ADMIN — METRONIDAZOLE 500 MG: 500 TABLET ORAL at 02:02

## 2022-02-26 RX ADMIN — KETOROLAC TROMETHAMINE 10 MG: 30 INJECTION, SOLUTION INTRAMUSCULAR at 01:02

## 2022-02-26 RX ADMIN — Medication 1 CAPSULE: at 11:02

## 2022-02-26 RX ADMIN — ASPIRIN 81 MG: 81 TABLET, COATED ORAL at 07:02

## 2022-02-26 RX ADMIN — EZETIMIBE 10 MG: 10 TABLET ORAL at 09:02

## 2022-02-26 RX ADMIN — PANTOPRAZOLE SODIUM 40 MG: 40 TABLET, DELAYED RELEASE ORAL at 08:02

## 2022-02-26 RX ADMIN — PRASUGREL 10 MG: 10 TABLET, FILM COATED ORAL at 12:02

## 2022-02-26 RX ADMIN — SODIUM CHLORIDE 500 ML: 0.9 INJECTION, SOLUTION INTRAVENOUS at 02:02

## 2022-02-26 RX ADMIN — CEFEPIME 1 G: 1 INJECTION, POWDER, FOR SOLUTION INTRAMUSCULAR; INTRAVENOUS at 08:02

## 2022-02-26 RX ADMIN — CEFEPIME 1 G: 1 INJECTION, POWDER, FOR SOLUTION INTRAMUSCULAR; INTRAVENOUS at 04:02

## 2022-02-26 NOTE — ASSESSMENT & PLAN NOTE
"66 year old female with PMH of multivessel CAD/MI s/p PCI (last 1/24/22), HFrEF with EF 20% s/p AICD, arthritis, HLD, HTN who presents due to abdominal pain and concerns for "fistula". In the ED afebrile but soft Bps. Labs with no leukocytosis, normal kidney function, UA appears infectious, LA WNL, trop 0.032. EKG with no acute ischemic changes when compared to prior. CT AP with contrast concerning for diverticulits of sigmoid and concern for vesicovaginal vs colorectal vs colovesical fistula. CRS consulted. She was given 1L IVFs, dose of cefepime,and admitted to .    - CRS to see this AM  - npo for now    "

## 2022-02-26 NOTE — PROGRESS NOTES
Pharmacist Renal Dose Adjustment Note    Odette Hart is a 66 y.o. female being treated with the medication cefepime    Patient Data:    Vital Signs (Most Recent):  Temp: 98.8 °F (37.1 °C) (02/26/22 0641)  Pulse: 60 (02/26/22 0641)  Resp: 18 (02/26/22 0641)  BP: (!) 100/49 (02/26/22 0641)  SpO2: 100 % (02/26/22 0641)   Vital Signs (72h Range):  Temp:  [97.6 °F (36.4 °C)-98.8 °F (37.1 °C)]   Pulse:  [60-78]   Resp:  [17-24]   BP: ()/(38-56)   SpO2:  [95 %-100 %]      Recent Labs   Lab 02/25/22  1710 02/26/22  0450   CREATININE 0.7 0.6     Serum creatinine: 0.6 mg/dL 02/26/22 0450  Estimated creatinine clearance: 77.3 mL/min    Cefepime 1744mg IV q12h was changed to 1g IV q8h    Christine Benavidez, PharmD, BCPS  [id number]

## 2022-02-26 NOTE — PROGRESS NOTES
ED Resident HAND-OFF NOTE:  10:16 PM 2/25/2022  Odette Hart is a 66 y.o. female who presented to the ED on 2/25/2022 and has been managed by ***, who reports patient C/O ***. I assumed care of patient from off-going ED physician team at 10:16 PM pending ***.    On my evaluation, Odette Hart appears {DESC; WELL/ILL:63412} hemodynamically stable and in NAD. Thus far, Odette Hart has received:  Medications   sodium chloride 0.9% bolus 250 mL (0 mLs Intravenous Stopped 2/25/22 1806)   cefepime in dextrose 5 % IVPB 2 g (0 g Intravenous Stopped 2/25/22 1850)   ondansetron injection 4 mg (4 mg Intravenous Given 2/25/22 1722)   sodium chloride 0.9% bolus 500 mL (0 mLs Intravenous Stopped 2/25/22 1924)   iohexoL (OMNIPAQUE 350) injection 30 mL (30 mLs Rectal Given 2/25/22 2158)       On my exam, I appreciate:  BP (!) 98/49   Pulse 72   Temp 97.6 °F (36.4 °C) (Oral)   Resp (!) 22   LMP  (LMP Unknown)   SpO2 100%   Breastfeeding No     ED Course as of 02/25/22 2216 Fri Feb 25, 2022 1856 Troponin I(!): 0.032  Elevated but less than prior [BD]   1856 BNP(!): 1,096  More elevated than prior.  Patient denies any shortness of breath, chest pain, waking, or peripheral edema.  Unclear etiology of this elevation at this time [BD]   1857 Comprehensive metabolic panel(!)  CMP shows mild hyponatremia of 131 without impaired renal function or elevated LFTs [BD]   1857 CBC auto differential(!)  CBC unremarkable without leukocytosis, significant anemia, or decreased platelets   [BD]   1857 Procalcitonin: 0.04 [BD]   1857 SARS-CoV-2 RNA, Amplification, Qual: Negative [BD]   1857 Lactate, Robbie: 1.6 [BD]   1857 X-Ray Chest AP Portable  Chest x-ray shows no acute process such as pneumonia, pneumothorax, or pulmonary edema.    [BD]   1949 Urinalysis, Reflex to Urine Culture Urine, Clean Catch(!)  UA consistent with UTI for which patient is already receiving antibiotics [BD]   2132 Lactate, Robbie: 0.9 [BD]      ED  Course User Index  [BD] Chris Monk MD        Additional ED course:  ***    Disposition: ***  I have discussed and counseled Odette Hart regarding exam, results, diagnosis, treatment, and plan.  ______________________  Iker Mendez MD   Emergency Medicine Resident  2/25/2022

## 2022-02-26 NOTE — PLAN OF CARE
Problem: Adult Inpatient Plan of Care  Goal: Plan of Care Review  Outcome: Ongoing, Progressing  Goal: Absence of Hospital-Acquired Illness or Injury  Outcome: Ongoing, Progressing  Goal: Optimal Comfort and Wellbeing  Outcome: Ongoing, Progressing       AAOx4.  Able to ambulate independently in room.  Tolerating clear liquid diet.  C/O of LLQ abdominal pain, MD notified and new order rec'd for toradol IV.  Moderate relief obtained.  Will continue to monitor. Side rails up x 2, call light in reach, bed low and locked.

## 2022-02-26 NOTE — ED NOTES
"Assumed care of pt bed 07. Pt complaint of 10/10 pain to vagina and rectal area. Pt hypotensive BP 84/45 at this time. Pt states " It's right in my vagina. The pain is awful. It's painful where your pee comes out too and then it goes in my vagina. It's sharp and feels like air too. I can't even pass gas. " Awaiting MD orders/disposition. Bed rails up x 2 with bed locked in lowest position. Call light in reach. ED eval continues. Pt verbalizes understanding of plan of care.   "

## 2022-02-26 NOTE — HPI
"66 year old female with PMH of multivessel CAD/MI s/p PCI (last 1/24/22), HFrEF with EF 20% s/p AICD, arthritis, HLD, HTN who presents due to abdominal pain and concerns for "fistula". Patient reports she has been having green foul smelling vaginal discharge and the feeling of "air in urine" for the past year. Has never been worked up. Two days ago she developed worsening left sided/suprapubic abdominal pain with associated fever of 102, nausea, vomiting yesterday, and dysuria. She also noticed "stool on tissue after wiping". Due to worsening symptoms she presented to ED    In the ED afebrile but soft Bps. Labs with no leukocytosis, normal kidney function, UA appears infectious, LA WNL, trop 0.032. EKG with no acute ischemic changes when compared to prior. CT AP with contrast concerning for diverticulits of sigmoid and concern for vesicovaginal vs colorectal vs colovesical fistula. CRS consulted. She was given 1L IVFs, dose of cefepime,and admitted to .    "

## 2022-02-26 NOTE — ASSESSMENT & PLAN NOTE
Chronic systolic heart failure     PCI approx 1 month ago. BNP elevated but no evidence of volume overload or pulm edema     - started on Asa  - holding prasugrel until cleared by CRS  - hold GDMT given spesis  - continue home zetia     Results for orders placed during the hospital encounter of 01/22/22    Echo Saline Bubble? No    Interpretation Summary  · The left ventricle is severely enlarged with mild eccentric hypertrophy and severely decreased systolic function.  · There are segmental left ventricular wall motion abnormalities. The mid anteroseptal and anterior walls are akinetic. The apex is akinetic. The remainder of the left ventricular walls are severely hypokinetic.  · The estimated ejection fraction is 20%.  · Spontaneous echocardiographic contrast in the left ventricle.  · Grade III left ventricular diastolic dysfunction.  · Severe left atrial enlargement.  · Normal right ventricular size with normal right ventricular systolic function.  · Mild right atrial enlargement.  · Mild aortic regurgitation.  · Mild-to-moderate mitral regurgitation.  · Mild tricuspid regurgitation.  · The estimated PA systolic pressure is 28 mmHg.  · Intermediate central venous pressure (8 mmHg).  · Pacemaker/ICD lead.

## 2022-02-26 NOTE — SUBJECTIVE & OBJECTIVE
Past Medical History:   Diagnosis Date    Acute coronary syndrome     Allergy     Arthritis     Cardiomyopathy     CHF (congestive heart failure)     Coronary artery disease     Diverticulitis     Diverticulosis     Former smoker 2022    Heart attack     Heart disease     History of myocardial infarction 2022    Hyperlipidemia     Hypertension     Hypertension 2022    ICD (implantable cardioverter-defibrillator) in place        Past Surgical History:   Procedure Laterality Date    ATRIAL CARDIAC PACEMAKER INSERTION       SECTION      CORONARY STENT PLACEMENT      LEFT HEART CATHETERIZATION Left 2022    Procedure: CATHETERIZATION, HEART, LEFT;  Surgeon: Yohannes Cordova MD;  Location: [facility] CATH LAB;  Service: Cardiology;  Laterality: Left;       Review of patient's allergies indicates:   Allergen Reactions    Augmentin [amoxicillin-pot clavulanate] Swelling     Not allergic to amoxicillin just a derivative of augmentin    Lisinopril Other (See Comments)     Fluid around heart    Losartan Other (See Comments)     High potassium    Shellfish containing products Anaphylaxis     Pt.states she is allergic to SEAFOOD since the age of 13    Levaquin [levofloxacin] Other (See Comments)     Very bad joint pain    Clindamycin Palpitations     Chest pain       No current facility-administered medications on file prior to encounter.     Current Outpatient Medications on File Prior to Encounter   Medication Sig    acetaminophen (TYLENOL) 500 MG tablet Take 2 tablets (1,000 mg total) by mouth every 8 (eight) hours as needed for Pain.    albuterol (PROVENTIL/VENTOLIN HFA) 90 mcg/actuation inhaler Inhale 2 puffs into the lungs every 6 (six) hours as needed for Wheezing. Rescue    ALPRAZolam (XANAX) 0.5 MG tablet Take 1 tablet (0.5 mg total) by mouth nightly as needed for Anxiety.    aspirin (ECOTRIN) 81 MG EC tablet Take 1 tablet (81 mg total) by mouth once daily.    carvediloL (COREG) 12.5 MG tablet Take  1 tablet (12.5 mg total) by mouth 2 (two) times daily with meals.    ezetimibe (ZETIA) 10 mg tablet Take 1 tablet (10 mg total) by mouth once daily.    fluticasone propionate (FLONASE) 50 mcg/actuation nasal spray 1 spray (50 mcg total) by Each Nostril route once daily.    LIDOcaine HCl 2% (LIDOCAINE VISCOUS) 2 % Soln by Mucous Membrane route 4 (four) times daily as needed (sore throat). (Patient not taking: Reported on 2022)    nitroGLYCERIN (NITROSTAT) 0.4 MG SL tablet Place 1 tablet (0.4 mg total) under the tongue every 5 (five) minutes as needed for Chest pain.    omeprazole (PRILOSEC) 40 MG capsule Take 1 capsule (40 mg total) by mouth once daily.    prasugreL (EFFIENT) 10 mg Tab Take 1 tablet (10 mg total) by mouth once daily.    predniSONE (DELTASONE) 5 MG tablet 15mg (3 tabs) daily for 2 days, then 10mg (2 tabs) daily for 2 days, then 5mg (1 tab) daily for 2 days.    spironolactone (ALDACTONE) 25 MG tablet Take 1 tablet (25 mg total) by mouth once daily.     Family History       Problem Relation (Age of Onset)    Emphysema Father    Heart attack Brother    Heart disease Mother          Tobacco Use    Smoking status: Former Smoker     Packs/day: 0.50     Start date: 1976     Quit date: 2012     Years since quittin.2    Smokeless tobacco: Never Used   Substance and Sexual Activity    Alcohol use: No    Drug use: No    Sexual activity: Not on file     Review of Systems   Constitutional:  Positive for fatigue. Negative for chills and fever.   HENT:  Negative for congestion and rhinorrhea.    Eyes:  Negative for visual disturbance.   Respiratory:  Negative for cough and shortness of breath.    Cardiovascular:  Negative for chest pain and leg swelling.   Gastrointestinal:  Positive for abdominal pain, constipation, nausea and vomiting. Negative for diarrhea.   Genitourinary:  Negative for dysuria.   Musculoskeletal:  Negative for back pain.   Skin:  Negative for rash.   Neurological:  Negative  for syncope and weakness.   Psychiatric/Behavioral:  Negative for agitation and confusion.    Objective:     Vital Signs (Most Recent):  Temp: 98.8 °F (37.1 °C) (02/26/22 0641)  Pulse: 60 (02/26/22 0641)  Resp: 18 (02/26/22 0641)  BP: (!) 100/49 (02/26/22 0641)  SpO2: 100 % (02/26/22 0641)   Vital Signs (24h Range):  Temp:  [97.6 °F (36.4 °C)-98.8 °F (37.1 °C)] 98.8 °F (37.1 °C)  Pulse:  [60-78] 60  Resp:  [17-24] 18  SpO2:  [95 %-100 %] 100 %  BP: ()/(38-56) 100/49        There is no height or weight on file to calculate BMI.    Physical Exam  Constitutional:       General: She is not in acute distress.     Appearance: Normal appearance. She is not ill-appearing.   HENT:      Head: Normocephalic and atraumatic.      Mouth/Throat:      Mouth: Mucous membranes are moist.   Eyes:      Conjunctiva/sclera: Conjunctivae normal.   Cardiovascular:      Rate and Rhythm: Normal rate and regular rhythm.   Pulmonary:      Effort: Pulmonary effort is normal. No respiratory distress.      Breath sounds: Normal breath sounds. No wheezing.   Abdominal:      General: Abdomen is flat. There is no distension.      Palpations: Abdomen is soft.      Tenderness: There is abdominal tenderness (Mild LUQ/LLQ and suprabubic tenderness). There is no guarding or rebound.   Musculoskeletal:         General: Normal range of motion.      Cervical back: Normal range of motion. No rigidity.      Right lower leg: No edema.      Left lower leg: No edema.   Skin:     Capillary Refill: Capillary refill takes less than 2 seconds.   Neurological:      General: No focal deficit present.      Mental Status: She is alert and oriented to person, place, and time.   Psychiatric:         Mood and Affect: Mood normal.           Significant Labs: All pertinent labs within the past 24 hours have been reviewed.  Blood Culture:   Recent Labs   Lab 02/25/22 1710   LABBLOO No Growth to date  No Growth to date     CBC:   Recent Labs   Lab 02/25/22 1710  02/26/22  0450   WBC 3.90 3.19*   HGB 10.6* 10.1*   HCT 34.0* 32.3*    207     CMP:   Recent Labs   Lab 02/25/22  1710 02/26/22  0450   * 136   K 4.1 4.0   CL 98 106   CO2 24 24   GLU 93 87   BUN 13 9   CREATININE 0.7 0.6   CALCIUM 8.5* 8.3*   PROT 7.0 6.7   ALBUMIN 2.2* 2.1*   BILITOT 0.6 0.5   ALKPHOS 79 70   AST 22 18   ALT 14 14   ANIONGAP 9 6*   EGFRNONAA >60.0 >60.0     Lactic Acid:   Recent Labs   Lab 02/25/22 1710 02/25/22 2002   LACTATE 1.6 0.9     Troponin:   Recent Labs   Lab 02/25/22 1710   TROPONINI 0.032*     Urine Studies:   Recent Labs   Lab 02/25/22  1807   COLORU Tammy   APPEARANCEUA Cloudy*   PHUR 6.0   SPECGRAV 1.025   PROTEINUA 2+*   GLUCUA Negative   KETONESU Negative   BILIRUBINUA Negative   OCCULTUA 1+*   NITRITE Positive*   LEUKOCYTESUR 2+*   RBCUA 86*   WBCUA >100*   BACTERIA Moderate*   SQUAMEPITHEL 7   HYALINECASTS 0       Significant Imaging: I have reviewed all pertinent imaging results/findings within the past 24 hours.

## 2022-02-26 NOTE — H&P
"[facility]  History & Physical    Patient Name: Odette Hart  MRN: [id number]  Patient Class: IP- Inpatient  Admission Date: 2/25/2022  Attending Physician: Wesley Gottlieb MD   Primary Care Provider: Braxton Barragan MD         Patient information was obtained from patient, past medical records and ER records.     Subjective:     Principal Problem:Acute diverticulitis    Chief Complaint:   Chief Complaint   Patient presents with    Abdominal Pain     X 2 days; pt reports foul discharge from vagina. She has a diagnosed fistula.         HPI: 66 year old female with PMH of multivessel CAD/MI s/p PCI (last 1/24/22), HFrEF with EF 20% s/p AICD, arthritis, HLD, HTN who presents due to abdominal pain and concerns for "fistula". Patient reports she has been having green foul smelling vaginal discharge and the feeling of "air in urine" for the past year. Has never been worked up. Two days ago she developed worsening left sided/suprapubic abdominal pain with associated fever of 102, nausea, vomiting yesterday, and dysuria. She also noticed "stool on tissue after wiping". Due to worsening symptoms she presented to ED    In the ED afebrile but soft Bps. Labs with no leukocytosis, normal kidney function, UA appears infectious, LA WNL, trop 0.032. EKG with no acute ischemic changes when compared to prior. CT AP with contrast concerning for diverticulits of sigmoid and concern for vesicovaginal vs colorectal vs colovesical fistula. CRS consulted. She was given 1L IVFs, dose of cefepime,and admitted to .        Past Medical History:   Diagnosis Date    Acute coronary syndrome     Allergy     Arthritis     Cardiomyopathy     CHF (congestive heart failure)     Coronary artery disease     Diverticulitis     Diverticulosis     Former smoker 1/24/2022    Heart attack     Heart disease     History of myocardial infarction 1/24/2022    Hyperlipidemia     Hypertension     Hypertension " 2022    ICD (implantable cardioverter-defibrillator) in place        Past Surgical History:   Procedure Laterality Date    ATRIAL CARDIAC PACEMAKER INSERTION       SECTION      CORONARY STENT PLACEMENT      LEFT HEART CATHETERIZATION Left 2022    Procedure: CATHETERIZATION, HEART, LEFT;  Surgeon: Yohannes Cordova MD;  Location: [facility] CATH LAB;  Service: Cardiology;  Laterality: Left;       Review of patient's allergies indicates:   Allergen Reactions    Augmentin [amoxicillin-pot clavulanate] Swelling     Not allergic to amoxicillin just a derivative of augmentin    Lisinopril Other (See Comments)     Fluid around heart    Losartan Other (See Comments)     High potassium    Shellfish containing products Anaphylaxis     Pt.states she is allergic to SEAFOOD since the age of 13    Levaquin [levofloxacin] Other (See Comments)     Very bad joint pain    Clindamycin Palpitations     Chest pain       No current facility-administered medications on file prior to encounter.     Current Outpatient Medications on File Prior to Encounter   Medication Sig    acetaminophen (TYLENOL) 500 MG tablet Take 2 tablets (1,000 mg total) by mouth every 8 (eight) hours as needed for Pain.    albuterol (PROVENTIL/VENTOLIN HFA) 90 mcg/actuation inhaler Inhale 2 puffs into the lungs every 6 (six) hours as needed for Wheezing. Rescue    ALPRAZolam (XANAX) 0.5 MG tablet Take 1 tablet (0.5 mg total) by mouth nightly as needed for Anxiety.    aspirin (ECOTRIN) 81 MG EC tablet Take 1 tablet (81 mg total) by mouth once daily.    carvediloL (COREG) 12.5 MG tablet Take 1 tablet (12.5 mg total) by mouth 2 (two) times daily with meals.    ezetimibe (ZETIA) 10 mg tablet Take 1 tablet (10 mg total) by mouth once daily.    fluticasone propionate (FLONASE) 50 mcg/actuation nasal spray 1 spray (50 mcg total) by Each Nostril route once daily.    LIDOcaine HCl 2% (LIDOCAINE VISCOUS) 2 % Soln by Mucous Membrane route 4 (four)  times daily as needed (sore throat). (Patient not taking: Reported on 2022)    nitroGLYCERIN (NITROSTAT) 0.4 MG SL tablet Place 1 tablet (0.4 mg total) under the tongue every 5 (five) minutes as needed for Chest pain.    omeprazole (PRILOSEC) 40 MG capsule Take 1 capsule (40 mg total) by mouth once daily.    prasugreL (EFFIENT) 10 mg Tab Take 1 tablet (10 mg total) by mouth once daily.    predniSONE (DELTASONE) 5 MG tablet 15mg (3 tabs) daily for 2 days, then 10mg (2 tabs) daily for 2 days, then 5mg (1 tab) daily for 2 days.    spironolactone (ALDACTONE) 25 MG tablet Take 1 tablet (25 mg total) by mouth once daily.     Family History       Problem Relation (Age of Onset)    Emphysema Father    Heart attack Brother    Heart disease Mother          Tobacco Use    Smoking status: Former Smoker     Packs/day: 0.50     Start date: 1976     Quit date: 2012     Years since quittin.2    Smokeless tobacco: Never Used   Substance and Sexual Activity    Alcohol use: No    Drug use: No    Sexual activity: Not on file     Review of Systems   Constitutional:  Positive for fatigue. Negative for chills and fever.   HENT:  Negative for congestion and rhinorrhea.    Eyes:  Negative for visual disturbance.   Respiratory:  Negative for cough and shortness of breath.    Cardiovascular:  Negative for chest pain and leg swelling.   Gastrointestinal:  Positive for abdominal pain, constipation, nausea and vomiting. Negative for diarrhea.   Genitourinary:  Negative for dysuria.   Musculoskeletal:  Negative for back pain.   Skin:  Negative for rash.   Neurological:  Negative for syncope and weakness.   Psychiatric/Behavioral:  Negative for agitation and confusion.    Objective:     Vital Signs (Most Recent):  Temp: 98.8 °F (37.1 °C) (22)  Pulse: 60 (22)  Resp: 18 (22)  BP: (!) 100/49 (22)  SpO2: 100 % (22)   Vital Signs (24h Range):  Temp:  [97.6 °F (36.4  °C)-98.8 °F (37.1 °C)] 98.8 °F (37.1 °C)  Pulse:  [60-78] 60  Resp:  [17-24] 18  SpO2:  [95 %-100 %] 100 %  BP: ()/(38-56) 100/49        There is no height or weight on file to calculate BMI.    Physical Exam  Constitutional:       General: She is not in acute distress.     Appearance: Normal appearance. She is not ill-appearing.   HENT:      Head: Normocephalic and atraumatic.      Mouth/Throat:      Mouth: Mucous membranes are moist.   Eyes:      Conjunctiva/sclera: Conjunctivae normal.   Cardiovascular:      Rate and Rhythm: Normal rate and regular rhythm.   Pulmonary:      Effort: Pulmonary effort is normal. No respiratory distress.      Breath sounds: Normal breath sounds. No wheezing.   Abdominal:      General: Abdomen is flat. There is no distension.      Palpations: Abdomen is soft.      Tenderness: There is abdominal tenderness (Mild LUQ/LLQ and suprabubic tenderness). There is no guarding or rebound.   Musculoskeletal:         General: Normal range of motion.      Cervical back: Normal range of motion. No rigidity.      Right lower leg: No edema.      Left lower leg: No edema.   Skin:     Capillary Refill: Capillary refill takes less than 2 seconds.   Neurological:      General: No focal deficit present.      Mental Status: She is alert and oriented to person, place, and time.   Psychiatric:         Mood and Affect: Mood normal.           Significant Labs: All pertinent labs within the past 24 hours have been reviewed.  Blood Culture:   Recent Labs   Lab 02/25/22 1710   LABBLOO No Growth to date  No Growth to date     CBC:   Recent Labs   Lab 02/25/22 1710 02/26/22  0450   WBC 3.90 3.19*   HGB 10.6* 10.1*   HCT 34.0* 32.3*    207     CMP:   Recent Labs   Lab 02/25/22 1710 02/26/22  0450   * 136   K 4.1 4.0   CL 98 106   CO2 24 24   GLU 93 87   BUN 13 9   CREATININE 0.7 0.6   CALCIUM 8.5* 8.3*   PROT 7.0 6.7   ALBUMIN 2.2* 2.1*   BILITOT 0.6 0.5   ALKPHOS 79 70   AST 22 18   ALT 14 14  "  ANIONGAP 9 6*   EGFRNONAA >60.0 >60.0     Lactic Acid:   Recent Labs   Lab 02/25/22  1710 02/25/22 2002   LACTATE 1.6 0.9     Troponin:   Recent Labs   Lab 02/25/22  1710   TROPONINI 0.032*     Urine Studies:   Recent Labs   Lab 02/25/22  1807   COLORU Tammy   APPEARANCEUA Cloudy*   PHUR 6.0   SPECGRAV 1.025   PROTEINUA 2+*   GLUCUA Negative   KETONESU Negative   BILIRUBINUA Negative   OCCULTUA 1+*   NITRITE Positive*   LEUKOCYTESUR 2+*   RBCUA 86*   WBCUA >100*   BACTERIA Moderate*   SQUAMEPITHEL 7   HYALINECASTS 0       Significant Imaging: I have reviewed all pertinent imaging results/findings within the past 24 hours.    Assessment/Plan:     * Acute diverticulitis  CT scan with acute diverticulitis of sigmoid    - cefepime and flagyl    Colovesical fistula  66 year old female with PMH of multivessel CAD/MI s/p PCI (last 1/24/22), HFrEF with EF 20% s/p AICD, arthritis, HLD, HTN who presents due to abdominal pain and concerns for "fistula". In the ED afebrile but soft Bps. Labs with no leukocytosis, normal kidney function, UA appears infectious, LA WNL, trop 0.032. EKG with no acute ischemic changes when compared to prior. CT AP with contrast concerning for diverticulits of sigmoid and concern for vesicovaginal vs colorectal vs colovesical fistula. CRS consulted. She was given 1L IVFs, dose of cefepime,and admitted to .    - CRS to see this AM  - npo for now      Urinary tract infection with pyuria  UA appears consistent with infection     - cefepime as above  - follow up urine and blood cultures       Primary hypertension  - hold home bp meds      Coronary artery disease of native artery of native heart with stable angina pectoris  - see above      Ischemic cardiomyopathy  Chronic systolic heart failure     PCI approx 1 month ago. BNP elevated but no evidence of volume overload or pulm edema     - started on Asa  - holding prasugrel until cleared by CRS  - hold GDMT given spesis  - continue home zetia "     Results for orders placed during the hospital encounter of 01/22/22    Echo Saline Bubble? No    Interpretation Summary  · The left ventricle is severely enlarged with mild eccentric hypertrophy and severely decreased systolic function.  · There are segmental left ventricular wall motion abnormalities. The mid anteroseptal and anterior walls are akinetic. The apex is akinetic. The remainder of the left ventricular walls are severely hypokinetic.  · The estimated ejection fraction is 20%.  · Spontaneous echocardiographic contrast in the left ventricle.  · Grade III left ventricular diastolic dysfunction.  · Severe left atrial enlargement.  · Normal right ventricular size with normal right ventricular systolic function.  · Mild right atrial enlargement.  · Mild aortic regurgitation.  · Mild-to-moderate mitral regurgitation.  · Mild tricuspid regurgitation.  · The estimated PA systolic pressure is 28 mmHg.  · Intermediate central venous pressure (8 mmHg).  · Pacemaker/ICD lead.          VTE Risk Mitigation (From admission, onward)         Ordered     Reason for No Pharmacological VTE Prophylaxis  Once        Question:  Reasons:  Answer:  Risk of Bleeding    02/26/22 0353     IP VTE HIGH RISK PATIENT  Once         02/26/22 0353     Place sequential compression device  Until discontinued         02/26/22 0353                   Evens Talley MD  Department of Hospital Medicine   [facility] - Telemetry Stepdown

## 2022-02-26 NOTE — CONSULTS
[facility] - Telemetry Stepdown  Colorectal Surgery  Consult Note    Patient Name: Odette Hart  MRN: [id number]  Admission Date: 2/25/2022  Hospital Length of Stay: 0 days  Attending Physician: Wesley Gottlieb MD  Primary Care Provider: Braxton Barragan MD    Inpatient consult to Colorectal Surgery  Consult performed by: Martha Luis MD  Consult ordered by: Iker Mendez MD        Subjective:     Chief Complaint/Reason for Admission: sepsis secondary to urinary tract infection, concern for colovesical fistula     History of Present Illness:   66F with complicated cardiac history including CAD s/p PCI (1/24/22) on dual antiplatelet therapy, CHF (EF 20%), HTN, ICD, and diverticulitis presents with complaints of fecaluria.     Patient has a longstanding history of diverticulitis. First episode in 2006, then another significant episode requiring inpatient admission in 2014 and again in 2021. Describes multiple episodes treated with oral antibiotics by PCP, particularly in the past two years when associated urinary symptoms developed. Initially UTIs were asymptomatic, but have progressively caused dysuria. Yesterday, noted the presence of fecaluria, which prompted her to present for evaluation. Admits to febrile episode to 102 two day ago. Associated weakness and fatigue that attributes to chronic infection and feelings of illness. Denies current CP, SOB, or LE edema. No prior colonoscopy. No family history of colon cancer. Denies BRBPR or melena. No PO intolerance. Also notes new onset of foul smelling vaginal discharge in the past year. No prior referral or evaluation for surgical resection.     Workup in the ED notable for positive U/a (+nitrite, 2+leukocytes) and CT demonstrating sigmoid diverticulitis with concern for colovesical fistula, possible vesicovaginal fistula vs colovaginal fistula.         Current Facility-Administered Medications   Medication    albuterol inhaler 2 puff    aspirin EC tablet 81 mg     ciprofloxacin HCl tablet 500 mg    dextrose 10% bolus 125 mL    dextrose 10% bolus 250 mL    glucagon (human recombinant) injection 1 mg    glucose chewable tablet 16 g    glucose chewable tablet 24 g    metroNIDAZOLE tablet 500 mg    naloxone 0.4 mg/mL injection 0.02 mg    pantoprazole EC tablet 40 mg    sodium chloride 0.9% flush 10 mL       Review of patient's allergies indicates:   Allergen Reactions    Augmentin [amoxicillin-pot clavulanate] Swelling     Not allergic to amoxicillin just a derivative of augmentin    Lisinopril Other (See Comments)     Fluid around heart    Losartan Other (See Comments)     High potassium    Shellfish containing products Anaphylaxis     Pt.states she is allergic to SEAFOOD since the age of 13    Levaquin [levofloxacin] Other (See Comments)     Very bad joint pain    Clindamycin Palpitations     Chest pain       Past Medical History:   Diagnosis Date    Acute coronary syndrome     Allergy     Arthritis     Cardiomyopathy     CHF (congestive heart failure)     Coronary artery disease     Diverticulitis     Diverticulosis     Former smoker 2022    Heart attack     Heart disease     History of myocardial infarction 2022    Hyperlipidemia     Hypertension     Hypertension 2022    ICD (implantable cardioverter-defibrillator) in place      Past Surgical History:   Procedure Laterality Date    ATRIAL CARDIAC PACEMAKER INSERTION       SECTION      CORONARY STENT PLACEMENT      LEFT HEART CATHETERIZATION Left 2022    Procedure: CATHETERIZATION, HEART, LEFT;  Surgeon: Yohannes Cordova MD;  Location: [facility] CATH LAB;  Service: Cardiology;  Laterality: Left;     Family History     Problem Relation (Age of Onset)    Emphysema Father    Heart attack Brother    Heart disease Mother        Tobacco Use    Smoking status: Former Smoker     Packs/day: 0.50     Start date: 1976     Quit date: 2012     Years since quittin.2     Smokeless tobacco: Never Used   Substance and Sexual Activity    Alcohol use: No    Drug use: No    Sexual activity: Not on file     Review of Systems   Constitutional: Positive for fatigue and fever.   HENT: Negative for voice change.    Respiratory: Negative for chest tightness and shortness of breath.    Cardiovascular: Negative for leg swelling.   Gastrointestinal: Positive for constipation. Negative for abdominal pain and blood in stool.   Genitourinary: Positive for difficulty urinating and vaginal discharge.   Neurological: Positive for weakness.   Psychiatric/Behavioral: Negative for confusion.     Objective:     Vital Signs (Most Recent):  Temp: 98.1 °F (36.7 °C) (02/26/22 0332)  Pulse: 60 (02/26/22 0332)  Resp: 18 (02/26/22 0332)  BP: (!) 96/54 (02/26/22 0332)  SpO2: 96 % (02/26/22 0332) Vital Signs (24h Range):  Temp:  [97.6 °F (36.4 °C)-98.5 °F (36.9 °C)] 98.1 °F (36.7 °C)  Pulse:  [60-78] 60  Resp:  [17-24] 18  SpO2:  [95 %-100 %] 96 %  BP: ()/(38-56) 96/54        There is no height or weight on file to calculate BMI.    Physical Exam  Constitutional:       Appearance: Normal appearance.   HENT:      Head: Normocephalic.      Nose: Nose normal.      Mouth/Throat:      Mouth: Mucous membranes are moist.   Eyes:      Extraocular Movements: Extraocular movements intact.   Cardiovascular:      Rate and Rhythm: Normal rate.   Pulmonary:      Effort: Pulmonary effort is normal.   Abdominal:      Comments: Soft, ND, mild suprapubic ttp, no rebound, no guarding, low midline vertical incision well healed   Musculoskeletal:         General: Normal range of motion.      Cervical back: Normal range of motion.   Skin:     General: Skin is warm and dry.   Neurological:      General: No focal deficit present.      Mental Status: She is alert.   Psychiatric:         Mood and Affect: Mood normal.         Behavior: Behavior normal.         Thought Content: Thought content normal.         Judgment: Judgment  normal.         Significant Labs:  BMP:   Recent Labs   Lab 02/26/22  0450   GLU 87      K 4.0      CO2 24   BUN 9   CREATININE 0.6   CALCIUM 8.3*   MG 1.8     CBC:   Recent Labs   Lab 02/26/22  0450   WBC 3.19*   RBC 3.85*   HGB 10.1*   HCT 32.3*      MCV 84   MCH 26.2*   MCHC 31.3*     CRP: No results for input(s): CRP in the last 168 hours.    Significant Diagnostics:  CT: I have reviewed all pertinent results/findings within the past 24 hours:  ct abd/pelvis 2/25/22     Impression:     1. Sigmoid colon extensive diverticulosis with wall thickening and surrounding fat stranding suggestive of recurrent diverticulitis.  No free air or intraperitoneal fluid collection.  Given the chronicity of sigmoid colon wall thickening compared to the October 25, 2021 examination, consider sigmoidoscopy follow-up to exclude neoplasia.  2. No rectal contrast extravasation to suggest rectovaginal fistula, however there is persistent gas within the bladder, cul-de-sac, and left adnexae which could be seen in vesicovaginal fistula or colorectal fistula.  An inflamed diverticulum is contiguous with abnormal urinary bladder wall thickening superolaterally on the left.  CT findings are suspicious but not definitive or colovesical fistula.  Could be further evaluated with nonemergent fluoroscopic examination, as clinically indicated.  3. Atherosclerotic calcification of the abdominal aorta with infrarenal ectasia.    Assessment/Plan:     Active Diagnoses:    Diagnosis Date Noted POA    PRINCIPAL PROBLEM:  Acute diverticulitis [K57.92] 01/12/2021 Yes    Colovesical fistula [N32.1] 02/26/2022 Yes    Urinary tract infection with pyuria [N39.0] 02/26/2022 Yes    Leukopenia [D72.819] 02/26/2022 Yes    Chronic combined systolic and diastolic congestive heart failure [I50.42] 02/17/2022 Yes    History of myocardial infarction [I25.2] 01/24/2022 Not Applicable    Primary hypertension [I10] 01/24/2022 Yes    Normocytic  anemia [D64.9] 01/23/2022 Yes    Familial hypercholesterolemia [E78.01] 01/22/2022 Yes    Coronary artery disease of native artery of native heart with stable angina pectoris [I25.118] 04/19/2021 Yes    Ischemic cardiomyopathy [I25.5] 02/04/2019 Yes    Automatic implantable cardioverter-defibrillator in situ [Z95.810] 02/04/2019 Yes    History of coronary artery stent placement [Z95.5] 02/04/2019 Not Applicable      Problems Resolved During this Admission:     66F c complicated cardiac history including CAD s/p PCI on dual antiplt therapy, CHF, and ICD presents with recurrent diverticulitis c/b colovesical fistula with possible vaginal involvement (colovaginal or vesicovaginal fistula). Clinically, appears improved on exam after resuscitation with IVFs and antibiotics.  However, may require suppressive antibiotics to prevent recurrent UTIs or associated sepsis in the future. Will plan for colonoscopy during this admission to evaluate for extent of diverticular disease and rule out presence of colon mass. Will need cardiac risk stratification for possible surgical intervention in the future, assuming not risk prohibitive, and discuss ability to hold anticoagulation therapy.   -cont antibiotic therapy, may narrow as cultures allow  -cont dual antiplt therapy  -colonoscopy this admission, time to be determined  -may continue diet as tolerated today  -will continue to follow    Thank you for your consult. Please call with questions.    Martha Luis MD  Colorectal Surgery  [facility] - Telemetry Stepdown

## 2022-02-26 NOTE — ASSESSMENT & PLAN NOTE
UA appears consistent with infection     - cefepime as above  - follow up urine and blood cultures

## 2022-02-27 LAB
ALBUMIN SERPL BCP-MCNC: 2.2 G/DL (ref 3.5–5.2)
ALP SERPL-CCNC: 70 U/L (ref 55–135)
ALT SERPL W/O P-5'-P-CCNC: 14 U/L (ref 10–44)
ANION GAP SERPL CALC-SCNC: 9 MMOL/L (ref 8–16)
ANISOCYTOSIS BLD QL SMEAR: SLIGHT
AST SERPL-CCNC: 15 U/L (ref 10–40)
BASOPHILS # BLD AUTO: 0.03 K/UL (ref 0–0.2)
BASOPHILS NFR BLD: 0.9 % (ref 0–1.9)
BILIRUB SERPL-MCNC: 0.7 MG/DL (ref 0.1–1)
BUN SERPL-MCNC: 7 MG/DL (ref 8–23)
CALCIUM SERPL-MCNC: 8.7 MG/DL (ref 8.7–10.5)
CHLORIDE SERPL-SCNC: 104 MMOL/L (ref 95–110)
CO2 SERPL-SCNC: 22 MMOL/L (ref 23–29)
CREAT SERPL-MCNC: 0.7 MG/DL (ref 0.5–1.4)
CRP SERPL-MCNC: 66.2 MG/L (ref 0–8.2)
DIFFERENTIAL METHOD: ABNORMAL
EOSINOPHIL # BLD AUTO: 0 K/UL (ref 0–0.5)
EOSINOPHIL NFR BLD: 0.3 % (ref 0–8)
ERYTHROCYTE [DISTWIDTH] IN BLOOD BY AUTOMATED COUNT: 19.6 % (ref 11.5–14.5)
EST. GFR  (AFRICAN AMERICAN): >60 ML/MIN/1.73 M^2
EST. GFR  (NON AFRICAN AMERICAN): >60 ML/MIN/1.73 M^2
GLUCOSE SERPL-MCNC: 96 MG/DL (ref 70–110)
HCT VFR BLD AUTO: 31.7 % (ref 37–48.5)
HGB BLD-MCNC: 10 G/DL (ref 12–16)
HYPOCHROMIA BLD QL SMEAR: ABNORMAL
IMM GRANULOCYTES # BLD AUTO: 0 K/UL (ref 0–0.04)
IMM GRANULOCYTES NFR BLD AUTO: 0 % (ref 0–0.5)
LYMPHOCYTES # BLD AUTO: 2.1 K/UL (ref 1–4.8)
LYMPHOCYTES NFR BLD: 64.7 % (ref 18–48)
MAGNESIUM SERPL-MCNC: 1.8 MG/DL (ref 1.6–2.6)
MCH RBC QN AUTO: 26.2 PG (ref 27–31)
MCHC RBC AUTO-ENTMCNC: 31.5 G/DL (ref 32–36)
MCV RBC AUTO: 83 FL (ref 82–98)
MONOCYTES # BLD AUTO: 0.7 K/UL (ref 0.3–1)
MONOCYTES NFR BLD: 22.4 % (ref 4–15)
NEUTROPHILS # BLD AUTO: 0.4 K/UL (ref 1.8–7.7)
NEUTROPHILS NFR BLD: 11.7 % (ref 38–73)
NRBC BLD-RTO: 1 /100 WBC
PHOSPHATE SERPL-MCNC: 3 MG/DL (ref 2.7–4.5)
PLATELET # BLD AUTO: 216 K/UL (ref 150–450)
PLATELET BLD QL SMEAR: ABNORMAL
PMV BLD AUTO: 10.7 FL (ref 9.2–12.9)
POTASSIUM SERPL-SCNC: 4.4 MMOL/L (ref 3.5–5.1)
PROT SERPL-MCNC: 6.7 G/DL (ref 6–8.4)
RBC # BLD AUTO: 3.82 M/UL (ref 4–5.4)
SODIUM SERPL-SCNC: 135 MMOL/L (ref 136–145)
WBC # BLD AUTO: 3.31 K/UL (ref 3.9–12.7)

## 2022-02-27 PROCEDURE — 85025 COMPLETE CBC W/AUTO DIFF WBC: CPT | Performed by: STUDENT IN AN ORGANIZED HEALTH CARE EDUCATION/TRAINING PROGRAM

## 2022-02-27 PROCEDURE — 20600001 HC STEP DOWN PRIVATE ROOM

## 2022-02-27 PROCEDURE — 25000003 PHARM REV CODE 250: Performed by: INTERNAL MEDICINE

## 2022-02-27 PROCEDURE — 99233 PR SUBSEQUENT HOSPITAL CARE,LEVL III: ICD-10-PCS | Mod: GC,,, | Performed by: INTERNAL MEDICINE

## 2022-02-27 PROCEDURE — 25000003 PHARM REV CODE 250: Performed by: STUDENT IN AN ORGANIZED HEALTH CARE EDUCATION/TRAINING PROGRAM

## 2022-02-27 PROCEDURE — 83735 ASSAY OF MAGNESIUM: CPT | Performed by: STUDENT IN AN ORGANIZED HEALTH CARE EDUCATION/TRAINING PROGRAM

## 2022-02-27 PROCEDURE — 94761 N-INVAS EAR/PLS OXIMETRY MLT: CPT

## 2022-02-27 PROCEDURE — 63600175 PHARM REV CODE 636 W HCPCS: Performed by: INTERNAL MEDICINE

## 2022-02-27 PROCEDURE — 86140 C-REACTIVE PROTEIN: CPT | Performed by: STUDENT IN AN ORGANIZED HEALTH CARE EDUCATION/TRAINING PROGRAM

## 2022-02-27 PROCEDURE — 36415 COLL VENOUS BLD VENIPUNCTURE: CPT | Performed by: STUDENT IN AN ORGANIZED HEALTH CARE EDUCATION/TRAINING PROGRAM

## 2022-02-27 PROCEDURE — 84100 ASSAY OF PHOSPHORUS: CPT | Performed by: STUDENT IN AN ORGANIZED HEALTH CARE EDUCATION/TRAINING PROGRAM

## 2022-02-27 PROCEDURE — 99233 SBSQ HOSP IP/OBS HIGH 50: CPT | Mod: GC,,, | Performed by: INTERNAL MEDICINE

## 2022-02-27 PROCEDURE — 80053 COMPREHEN METABOLIC PANEL: CPT | Performed by: STUDENT IN AN ORGANIZED HEALTH CARE EDUCATION/TRAINING PROGRAM

## 2022-02-27 RX ORDER — SPIRONOLACTONE 25 MG/1
25 TABLET ORAL DAILY
Status: DISCONTINUED | OUTPATIENT
Start: 2022-02-27 | End: 2022-02-28 | Stop reason: HOSPADM

## 2022-02-27 RX ORDER — POLYETHYLENE GLYCOL 3350 17 G/17G
17 POWDER, FOR SOLUTION ORAL DAILY
Status: DISCONTINUED | OUTPATIENT
Start: 2022-02-27 | End: 2022-02-28 | Stop reason: HOSPADM

## 2022-02-27 RX ORDER — SIMETHICONE 80 MG
1 TABLET,CHEWABLE ORAL 3 TIMES DAILY PRN
Status: DISCONTINUED | OUTPATIENT
Start: 2022-02-27 | End: 2022-02-28 | Stop reason: HOSPADM

## 2022-02-27 RX ADMIN — ACETAMINOPHEN 650 MG: 325 TABLET ORAL at 02:02

## 2022-02-27 RX ADMIN — METRONIDAZOLE 500 MG: 500 TABLET ORAL at 08:02

## 2022-02-27 RX ADMIN — Medication 1 CAPSULE: at 09:02

## 2022-02-27 RX ADMIN — PANTOPRAZOLE SODIUM 40 MG: 40 TABLET, DELAYED RELEASE ORAL at 08:02

## 2022-02-27 RX ADMIN — CEFEPIME 1 G: 1 INJECTION, POWDER, FOR SOLUTION INTRAMUSCULAR; INTRAVENOUS at 11:02

## 2022-02-27 RX ADMIN — METRONIDAZOLE 500 MG: 500 TABLET ORAL at 09:02

## 2022-02-27 RX ADMIN — EZETIMIBE 10 MG: 10 TABLET ORAL at 09:02

## 2022-02-27 RX ADMIN — CEFEPIME 1 G: 1 INJECTION, POWDER, FOR SOLUTION INTRAMUSCULAR; INTRAVENOUS at 08:02

## 2022-02-27 RX ADMIN — PRASUGREL 10 MG: 10 TABLET, FILM COATED ORAL at 08:02

## 2022-02-27 RX ADMIN — CEFEPIME 1 G: 1 INJECTION, POWDER, FOR SOLUTION INTRAMUSCULAR; INTRAVENOUS at 04:02

## 2022-02-27 RX ADMIN — ASPIRIN 81 MG: 81 TABLET, COATED ORAL at 08:02

## 2022-02-27 RX ADMIN — Medication 1 CAPSULE: at 08:02

## 2022-02-27 RX ADMIN — CEFEPIME 1 G: 1 INJECTION, POWDER, FOR SOLUTION INTRAMUSCULAR; INTRAVENOUS at 02:02

## 2022-02-27 RX ADMIN — METRONIDAZOLE 500 MG: 500 TABLET ORAL at 02:02

## 2022-02-27 NOTE — HOSPITAL COURSE
Imaging shows acute diverticulitis, consistent with patient's LLQ abdominal pain. Also shows evidence of a colovesicular fistula with possible vaginal involvement. She was started on cefepime and flagyl with rapid improvement of symptoms and CRP. She was evaluated by CRS who recommend a coloscopy for further insight to where the tract is going and surgical fixation. However, due to her history of very recently HEVER placement, it was decided that the best option is to manage medically for now, with a long course of antibiotics, and pursue colonoscopy and surgery on an outpatient basis. Patient agrees with this plan as she was very anxious about undergoing anesthesia after her recent PCI. She has no history of ESBL on prior urine cx, so we will treat with cefdinir and flagyl (no quinolone due to her extensive cardiac history) for one month.

## 2022-02-27 NOTE — PLAN OF CARE
Problem: Adult Inpatient Plan of Care  Goal: Plan of Care Review  Outcome: Ongoing, Progressing  Goal: Absence of Hospital-Acquired Illness or Injury  Outcome: Ongoing, Progressing  Goal: Optimal Comfort and Wellbeing  Outcome: Ongoing, Progressing  Goal: Readiness for Transition of Care  Outcome: Ongoing, Progressing       AAOx4, ambulates independently in room.  No c/o pain this shift.  Denies n/v.  Will continue to monitor. Side rails up x 2, call light in reach, bed low and locked.

## 2022-02-27 NOTE — PROGRESS NOTES
"[facility] - Telemetry [facility] Medicine  Progress Note    Patient Name: Odette Hart  MRN: [id number]  Patient Class: IP- Inpatient   Admission Date: 2/25/2022  Length of Stay: 1 days  Attending Physician: Wesley Gottlieb MD  Primary Care Provider: Barxton Barragan MD        Subjective:     Principal Problem:Acute diverticulitis        HPI:  66 year old female with PMH of multivessel CAD/MI s/p PCI (last 1/24/22), HFrEF with EF 20% s/p AICD, arthritis, HLD, HTN who presents due to abdominal pain and concerns for "fistula". Patient reports she has been having green foul smelling vaginal discharge and the feeling of "air in urine" for the past year. Has never been worked up. Two days ago she developed worsening left sided/suprapubic abdominal pain with associated fever of 102, nausea, vomiting yesterday, and dysuria. She also noticed "stool on tissue after wiping". Due to worsening symptoms she presented to ED    In the ED afebrile but soft Bps. Labs with no leukocytosis, normal kidney function, UA appears infectious, LA WNL, trop 0.032. EKG with no acute ischemic changes when compared to prior. CT AP with contrast concerning for diverticulits of sigmoid and concern for vesicovaginal vs colorectal vs colovesical fistula. CRS consulted. She was given 1L IVFs, dose of cefepime,and admitted to .        Overview/Hospital Course:  Admitted for acute diverticulitis, IV Abx of cefepime and flagyl. CRS deferred Colonoscopy to outpatient. Diet advanced to full liquid. Patient is very anxious, all questions and concerned addressed.         Interval History: No acute event overnight.       Review of Systems   Constitutional:  Positive for fatigue. Negative for chills and fever.   HENT:  Negative for congestion and rhinorrhea.    Eyes:  Negative for visual disturbance.   Respiratory:  Negative for cough and shortness of breath.    Cardiovascular:  Negative for chest pain and leg swelling.   Gastrointestinal:  Positive " for abdominal pain and vomiting. Negative for constipation, diarrhea and nausea.   Genitourinary:  Negative for dysuria.   Musculoskeletal:  Negative for back pain.   Skin:  Negative for rash.   Neurological:  Negative for syncope and weakness.   Psychiatric/Behavioral:  Negative for agitation and confusion.    Objective:     Vital Signs (Most Recent):  Temp: 97.5 °F (36.4 °C) (02/27/22 1115)  Pulse: 71 (02/27/22 1204)  Resp: 20 (02/27/22 1115)  BP: (!) 125/58 (02/27/22 1115)  SpO2: 98 % (02/27/22 1211)   Vital Signs (24h Range):  Temp:  [97.5 °F (36.4 °C)-98.4 °F (36.9 °C)] 97.5 °F (36.4 °C)  Pulse:  [60-74] 71  Resp:  [18-20] 20  SpO2:  [96 %-100 %] 98 %  BP: (102-125)/(52-59) 125/58     Weight: 59 kg (130 lb 1.1 oz)  Body mass index is 23.04 kg/m².    Intake/Output Summary (Last 24 hours) at 2/27/2022 1212  Last data filed at 2/26/2022 2000  Gross per 24 hour   Intake 1315.9 ml   Output --   Net 1315.9 ml      Physical Exam  Constitutional:       General: She is not in acute distress.     Appearance: Normal appearance. She is not ill-appearing.   HENT:      Head: Normocephalic and atraumatic.      Mouth/Throat:      Mouth: Mucous membranes are moist.   Eyes:      Conjunctiva/sclera: Conjunctivae normal.   Cardiovascular:      Rate and Rhythm: Normal rate and regular rhythm.   Pulmonary:      Effort: Pulmonary effort is normal. No respiratory distress.      Breath sounds: Normal breath sounds. No wheezing.   Abdominal:      General: Abdomen is flat. There is no distension.      Palpations: Abdomen is soft.      Tenderness: There is abdominal tenderness (Mild LUQ/LLQ and suprabubic tenderness). There is no guarding or rebound.   Musculoskeletal:         General: Normal range of motion.      Cervical back: Normal range of motion. No rigidity.      Right lower leg: No edema.      Left lower leg: No edema.   Skin:     Capillary Refill: Capillary refill takes less than 2 seconds.   Neurological:      General: No focal  deficit present.      Mental Status: She is alert and oriented to person, place, and time.   Psychiatric:         Mood and Affect: Mood normal.       Significant Labs: All pertinent labs within the past 24 hours have been reviewed.  Recent Lab Results         02/27/22  0232        Albumin 2.2       Alkaline Phosphatase 70       ALT 14       Anion Gap 9       Aniso Slight       AST 15       Baso # 0.03       Basophil % 0.9       BILIRUBIN TOTAL 0.7  Comment: For infants and newborns, interpretation of results should be based  on gestational age, weight and in agreement with clinical  observations.    Premature Infant recommended reference ranges:  Up to 24 hours.............<8.0 mg/dL  Up to 48 hours............<12.0 mg/dL  3-5 days..................<15.0 mg/dL  6-29 days.................<15.0 mg/dL         BUN 7       Calcium 8.7       Chloride 104       CO2 22       Creatinine 0.7       Differential Method Automated       eGFR if  >60.0       eGFR if non  >60.0  Comment: Calculation used to obtain the estimated glomerular filtration  rate (eGFR) is the CKD-EPI equation.          Eos # 0.0       Eosinophil % 0.3       Glucose 96       Gran # (ANC) 0.4       Gran % 11.7       Hematocrit 31.7       Hemoglobin 10.0       Hypo Occasional       Immature Grans (Abs) 0.00  Comment: Mild elevation in immature granulocytes is non specific and   can be seen in a variety of conditions including stress response,   acute inflammation, trauma and pregnancy. Correlation with other   laboratory and clinical findings is essential.         Immature Granulocytes 0.0       Lymph # 2.1       Lymph % 64.7       Magnesium 1.8       MCH 26.2       MCHC 31.5       MCV 83       Mono # 0.7       Mono % 22.4       MPV 10.7       nRBC 1       Phosphorus 3.0       Platelet Estimate Appears normal       Platelets 216       Potassium 4.4       PROTEIN TOTAL 6.7       RBC 3.82       RDW 19.6       Sodium 135        "WBC 3.31               Significant Imaging: I have reviewed all pertinent imaging results/findings within the past 24 hours.      Assessment/Plan:      * Acute diverticulitis  CT scan with acute diverticulitis of sigmoid    - cefepime and flagyl    Leukopenia        Urinary tract infection with pyuria  UA appears consistent with infection     - cefepime as above  - follow up urine and blood cultures       Colovesical fistula  66 year old female with PMH of multivessel CAD/MI s/p PCI (last 1/24/22), HFrEF with EF 20% s/p AICD, arthritis, HLD, HTN who presents due to abdominal pain and concerns for "fistula". In the ED afebrile but soft Bps. Labs with no leukocytosis, normal kidney function, UA appears infectious, LA WNL, trop 0.032. EKG with no acute ischemic changes when compared to prior. CT AP with contrast concerning for diverticulits of sigmoid and concern for vesicovaginal vs colorectal vs colovesical fistula. CRS consulted. She was given 1L IVFs, dose of cefepime,and admitted to .    - Continue IV Abx for now  - CRS will plan for colonoscopy outpatient   - advance diet   - Bowl regimen       Chronic combined systolic and diastolic congestive heart failure  Resume home Aldactone       Primary hypertension  - hold home bp meds      Normocytic anemia  Daily CBC       Familial hypercholesterolemia  Continue home statin       Coronary artery disease of native artery of native heart with stable angina pectoris  - see above      Automatic implantable cardioverter-defibrillator in situ        Ischemic cardiomyopathy  Chronic systolic heart failure     PCI approx 1 month ago. BNP elevated but no evidence of volume overload or pulm edema     - started on Asa  - holding prasugrel until cleared by CRS  - hold GDMT given spesis  - continue home zetia     Results for orders placed during the hospital encounter of 01/22/22    Echo Saline Bubble? No    Interpretation Summary  · The left ventricle is severely enlarged with " mild eccentric hypertrophy and severely decreased systolic function.  · There are segmental left ventricular wall motion abnormalities. The mid anteroseptal and anterior walls are akinetic. The apex is akinetic. The remainder of the left ventricular walls are severely hypokinetic.  · The estimated ejection fraction is 20%.  · Spontaneous echocardiographic contrast in the left ventricle.  · Grade III left ventricular diastolic dysfunction.  · Severe left atrial enlargement.  · Normal right ventricular size with normal right ventricular systolic function.  · Mild right atrial enlargement.  · Mild aortic regurgitation.  · Mild-to-moderate mitral regurgitation.  · Mild tricuspid regurgitation.  · The estimated PA systolic pressure is 28 mmHg.  · Intermediate central venous pressure (8 mmHg).  · Pacemaker/ICD lead.          VTE Risk Mitigation (From admission, onward)         Ordered     Reason for No Pharmacological VTE Prophylaxis  Once        Question:  Reasons:  Answer:  Risk of Bleeding    02/26/22 0353     IP VTE HIGH RISK PATIENT  Once         02/26/22 0353     Place sequential compression device  Until discontinued         02/26/22 0353                Discharge Planning   SIVAN: 2/28/2022     Code Status: Full Code   Is the patient medically ready for discharge?:     Reason for patient still in hospital (select all that apply): Patient trending condition                     Parth Grajeda MD  Department of Hospital Medicine   [facility] - Telemetry Stepdown

## 2022-02-27 NOTE — ASSESSMENT & PLAN NOTE
"66 year old female with PMH of multivessel CAD/MI s/p PCI (last 1/24/22), HFrEF with EF 20% s/p AICD, arthritis, HLD, HTN who presents due to abdominal pain and concerns for "fistula". In the ED afebrile but soft Bps. Labs with no leukocytosis, normal kidney function, UA appears infectious, LA WNL, trop 0.032. EKG with no acute ischemic changes when compared to prior. CT AP with contrast concerning for diverticulits of sigmoid and concern for vesicovaginal vs colorectal vs colovesical fistula. CRS consulted. She was given 1L IVFs, dose of cefepime,and admitted to .    - Continue IV Abx for now  - CRS will plan for colonoscopy outpatient   - advance diet   - Bowl regimen     "

## 2022-02-27 NOTE — SUBJECTIVE & OBJECTIVE
Interval History: No acute event overnight.       Review of Systems   Constitutional:  Positive for fatigue. Negative for chills and fever.   HENT:  Negative for congestion and rhinorrhea.    Eyes:  Negative for visual disturbance.   Respiratory:  Negative for cough and shortness of breath.    Cardiovascular:  Negative for chest pain and leg swelling.   Gastrointestinal:  Positive for abdominal pain and vomiting. Negative for constipation, diarrhea and nausea.   Genitourinary:  Negative for dysuria.   Musculoskeletal:  Negative for back pain.   Skin:  Negative for rash.   Neurological:  Negative for syncope and weakness.   Psychiatric/Behavioral:  Negative for agitation and confusion.    Objective:     Vital Signs (Most Recent):  Temp: 97.5 °F (36.4 °C) (02/27/22 1115)  Pulse: 71 (02/27/22 1204)  Resp: 20 (02/27/22 1115)  BP: (!) 125/58 (02/27/22 1115)  SpO2: 98 % (02/27/22 1211)   Vital Signs (24h Range):  Temp:  [97.5 °F (36.4 °C)-98.4 °F (36.9 °C)] 97.5 °F (36.4 °C)  Pulse:  [60-74] 71  Resp:  [18-20] 20  SpO2:  [96 %-100 %] 98 %  BP: (102-125)/(52-59) 125/58     Weight: 59 kg (130 lb 1.1 oz)  Body mass index is 23.04 kg/m².    Intake/Output Summary (Last 24 hours) at 2/27/2022 1212  Last data filed at 2/26/2022 2000  Gross per 24 hour   Intake 1315.9 ml   Output --   Net 1315.9 ml      Physical Exam  Constitutional:       General: She is not in acute distress.     Appearance: Normal appearance. She is not ill-appearing.   HENT:      Head: Normocephalic and atraumatic.      Mouth/Throat:      Mouth: Mucous membranes are moist.   Eyes:      Conjunctiva/sclera: Conjunctivae normal.   Cardiovascular:      Rate and Rhythm: Normal rate and regular rhythm.   Pulmonary:      Effort: Pulmonary effort is normal. No respiratory distress.      Breath sounds: Normal breath sounds. No wheezing.   Abdominal:      General: Abdomen is flat. There is no distension.      Palpations: Abdomen is soft.      Tenderness: There is  abdominal tenderness (Mild LUQ/LLQ and suprabubic tenderness). There is no guarding or rebound.   Musculoskeletal:         General: Normal range of motion.      Cervical back: Normal range of motion. No rigidity.      Right lower leg: No edema.      Left lower leg: No edema.   Skin:     Capillary Refill: Capillary refill takes less than 2 seconds.   Neurological:      General: No focal deficit present.      Mental Status: She is alert and oriented to person, place, and time.   Psychiatric:         Mood and Affect: Mood normal.       Significant Labs: All pertinent labs within the past 24 hours have been reviewed.  Recent Lab Results         02/27/22  0232        Albumin 2.2       Alkaline Phosphatase 70       ALT 14       Anion Gap 9       Aniso Slight       AST 15       Baso # 0.03       Basophil % 0.9       BILIRUBIN TOTAL 0.7  Comment: For infants and newborns, interpretation of results should be based  on gestational age, weight and in agreement with clinical  observations.    Premature Infant recommended reference ranges:  Up to 24 hours.............<8.0 mg/dL  Up to 48 hours............<12.0 mg/dL  3-5 days..................<15.0 mg/dL  6-29 days.................<15.0 mg/dL         BUN 7       Calcium 8.7       Chloride 104       CO2 22       Creatinine 0.7       Differential Method Automated       eGFR if  >60.0       eGFR if non  >60.0  Comment: Calculation used to obtain the estimated glomerular filtration  rate (eGFR) is the CKD-EPI equation.          Eos # 0.0       Eosinophil % 0.3       Glucose 96       Gran # (ANC) 0.4       Gran % 11.7       Hematocrit 31.7       Hemoglobin 10.0       Hypo Occasional       Immature Grans (Abs) 0.00  Comment: Mild elevation in immature granulocytes is non specific and   can be seen in a variety of conditions including stress response,   acute inflammation, trauma and pregnancy. Correlation with other   laboratory and clinical findings is  essential.         Immature Granulocytes 0.0       Lymph # 2.1       Lymph % 64.7       Magnesium 1.8       MCH 26.2       MCHC 31.5       MCV 83       Mono # 0.7       Mono % 22.4       MPV 10.7       nRBC 1       Phosphorus 3.0       Platelet Estimate Appears normal       Platelets 216       Potassium 4.4       PROTEIN TOTAL 6.7       RBC 3.82       RDW 19.6       Sodium 135       WBC 3.31               Significant Imaging: I have reviewed all pertinent imaging results/findings within the past 24 hours.

## 2022-02-27 NOTE — PROGRESS NOTES
[facility] - Telemetry Stepdown  Colorectal Surgery  Progress Note    Patient Name: Odette Hart  MRN: [id number]  Admission Date: 2/25/2022  Hospital Length of Stay: 1 days  Attending Physician: Wesley Gottlieb MD    Subjective:     Interval History:   NAEON. Tolerating clears, but expresses interest in regular diet. No BM in past day. Notes mild pain to L side. No further dysuria.     Post-Op Info:  * No surgery found *          Medications:  Continuous Infusions:  Scheduled Meds:   aspirin  81 mg Oral Daily    ceFEPime (MAXIPIME) IVPB  1 g Intravenous Q8H    ezetimibe  10 mg Oral QHS    Lactobacillus rhamnosus GG  1 capsule Oral BID    metroNIDAZOLE  500 mg Oral Q8H    pantoprazole  40 mg Oral Daily    prasugreL  10 mg Oral Daily     PRN Meds:   acetaminophen    albuterol    dextrose 10%    dextrose 10%    glucagon (human recombinant)    glucose    glucose    naloxone    oxyCODONE    sodium chloride 0.9%        Objective:     Vital Signs (Most Recent):  Temp: 97.7 °F (36.5 °C) (02/27/22 0744)  Pulse: 60 (02/27/22 0821)  Resp: 18 (02/27/22 0744)  BP: (!) 121/59 (02/27/22 0744)  SpO2: 96 % (02/27/22 0744) Vital Signs (24h Range):  Temp:  [97.7 °F (36.5 °C)-98.4 °F (36.9 °C)] 97.7 °F (36.5 °C)  Pulse:  [60-74] 60  Resp:  [17-20] 18  SpO2:  [96 %-100 %] 96 %  BP: (102-121)/(52-59) 121/59     Intake/Output - Last 3 Shifts       02/25 0700  02/26 0659 02/26 0700  02/27 0659 02/27 0700  02/28 0659    P.O.  1260     IV Piggyback 833.3 595.9     Total Intake(mL/kg) 833.3 1855.9 (31.5)     Net +833.3 +1855.9            Urine Occurrence  5 x     Stool Occurrence  1 x           Physical Exam  Constitutional:       Appearance: Normal appearance.   HENT:      Head: Normocephalic.      Mouth/Throat:      Mouth: Mucous membranes are moist.   Eyes:      Extraocular Movements: Extraocular movements intact.   Pulmonary:      Effort: Pulmonary effort is normal.   Abdominal:      Comments: Soft, ND, mild L and  suprapubic ttp, no rebound, no guarding   Skin:     General: Skin is warm and dry.   Neurological:      Mental Status: She is alert.           Significant Labs:  CBC (Last 3 Results):   Recent Labs   Lab 02/25/22  1710 02/26/22  0450 02/27/22  0232   WBC 3.90 3.19* 3.31*   RBC 4.05 3.85* 3.82*   HGB 10.6* 10.1* 10.0*   HCT 34.0* 32.3* 31.7*    207 216   MCV 84 84 83   MCH 26.2* 26.2* 26.2*   MCHC 31.2* 31.3* 31.5*       Significant Diagnostics:  None    Assessment/Plan:     Active Diagnoses:    Diagnosis Date Noted POA    PRINCIPAL PROBLEM:  Acute diverticulitis [K57.92] 01/12/2021 Yes    Colovesical fistula [N32.1] 02/26/2022 Yes    Urinary tract infection with pyuria [N39.0] 02/26/2022 Yes    Leukopenia [D72.819] 02/26/2022 Yes    Chronic combined systolic and diastolic congestive heart failure [I50.42] 02/17/2022 Yes    History of myocardial infarction [I25.2] 01/24/2022 Not Applicable    Primary hypertension [I10] 01/24/2022 Yes    Normocytic anemia [D64.9] 01/23/2022 Yes    Familial hypercholesterolemia [E78.01] 01/22/2022 Yes    Coronary artery disease of native artery of native heart with stable angina pectoris [I25.118] 04/19/2021 Yes    Ischemic cardiomyopathy [I25.5] 02/04/2019 Yes    Automatic implantable cardioverter-defibrillator in situ [Z95.810] 02/04/2019 Yes    History of coronary artery stent placement [Z95.5] 02/04/2019 Not Applicable      Problems Resolved During this Admission:     66F with complicated cardiac history including recent PCI on dual antiplatelet therapy admitted with recurrent diverticulitis c/b colovesical fistula, with possible vaginal involvement. Clinically, she appears stable on exam and given need for anticoagulation, will plan for outpatient colonoscopy in the near future and surgical intervention once cleared by Cardiology.   -may advance diet as tolerated  -recommend Miralax daily to prevent constipation  -pain medication as needed  -encourage  ambulation and normal physical activity  -cont antibiotics, may convert to oral regimen   -will continue to follow  -please call with questions      Martha Luis MD  Colorectal Surgery  [facility] - Telemetry Stepdown

## 2022-02-28 VITALS
SYSTOLIC BLOOD PRESSURE: 121 MMHG | BODY MASS INDEX: 23.04 KG/M2 | DIASTOLIC BLOOD PRESSURE: 59 MMHG | RESPIRATION RATE: 18 BRPM | HEIGHT: 63 IN | HEART RATE: 62 BPM | OXYGEN SATURATION: 97 % | TEMPERATURE: 98 F | WEIGHT: 130.06 LBS

## 2022-02-28 LAB
ALBUMIN SERPL BCP-MCNC: 2.1 G/DL (ref 3.5–5.2)
ALP SERPL-CCNC: 100 U/L (ref 55–135)
ALT SERPL W/O P-5'-P-CCNC: 13 U/L (ref 10–44)
ANION GAP SERPL CALC-SCNC: 8 MMOL/L (ref 8–16)
AST SERPL-CCNC: 16 U/L (ref 10–40)
BASOPHILS # BLD AUTO: 0.02 K/UL (ref 0–0.2)
BASOPHILS NFR BLD: 0.7 % (ref 0–1.9)
BILIRUB SERPL-MCNC: 0.6 MG/DL (ref 0.1–1)
BUN SERPL-MCNC: 7 MG/DL (ref 8–23)
CALCIUM SERPL-MCNC: 8.5 MG/DL (ref 8.7–10.5)
CHLORIDE SERPL-SCNC: 101 MMOL/L (ref 95–110)
CO2 SERPL-SCNC: 25 MMOL/L (ref 23–29)
CREAT SERPL-MCNC: 0.7 MG/DL (ref 0.5–1.4)
CRP SERPL-MCNC: 49 MG/L (ref 0–8.2)
DIFFERENTIAL METHOD: ABNORMAL
EOSINOPHIL # BLD AUTO: 0 K/UL (ref 0–0.5)
EOSINOPHIL NFR BLD: 0.4 % (ref 0–8)
ERYTHROCYTE [DISTWIDTH] IN BLOOD BY AUTOMATED COUNT: 19.5 % (ref 11.5–14.5)
EST. GFR  (AFRICAN AMERICAN): >60 ML/MIN/1.73 M^2
EST. GFR  (NON AFRICAN AMERICAN): >60 ML/MIN/1.73 M^2
GLUCOSE SERPL-MCNC: 94 MG/DL (ref 70–110)
HCT VFR BLD AUTO: 34 % (ref 37–48.5)
HGB BLD-MCNC: 11 G/DL (ref 12–16)
IMM GRANULOCYTES # BLD AUTO: 0.01 K/UL (ref 0–0.04)
IMM GRANULOCYTES NFR BLD AUTO: 0.4 % (ref 0–0.5)
LYMPHOCYTES # BLD AUTO: 1.9 K/UL (ref 1–4.8)
LYMPHOCYTES NFR BLD: 65.4 % (ref 18–48)
MAGNESIUM SERPL-MCNC: 1.8 MG/DL (ref 1.6–2.6)
MCH RBC QN AUTO: 26.8 PG (ref 27–31)
MCHC RBC AUTO-ENTMCNC: 32.4 G/DL (ref 32–36)
MCV RBC AUTO: 83 FL (ref 82–98)
MONOCYTES # BLD AUTO: 0.6 K/UL (ref 0.3–1)
MONOCYTES NFR BLD: 19.4 % (ref 4–15)
NEUTROPHILS # BLD AUTO: 0.4 K/UL (ref 1.8–7.7)
NEUTROPHILS NFR BLD: 13.7 % (ref 38–73)
NRBC BLD-RTO: 0 /100 WBC
PHOSPHATE SERPL-MCNC: 2.9 MG/DL (ref 2.7–4.5)
PLATELET # BLD AUTO: 232 K/UL (ref 150–450)
PMV BLD AUTO: 10.3 FL (ref 9.2–12.9)
POTASSIUM SERPL-SCNC: 3.9 MMOL/L (ref 3.5–5.1)
PROT SERPL-MCNC: 6.7 G/DL (ref 6–8.4)
RBC # BLD AUTO: 4.11 M/UL (ref 4–5.4)
SODIUM SERPL-SCNC: 134 MMOL/L (ref 136–145)
WBC # BLD AUTO: 2.83 K/UL (ref 3.9–12.7)

## 2022-02-28 PROCEDURE — 25000003 PHARM REV CODE 250: Performed by: STUDENT IN AN ORGANIZED HEALTH CARE EDUCATION/TRAINING PROGRAM

## 2022-02-28 PROCEDURE — 83735 ASSAY OF MAGNESIUM: CPT | Performed by: STUDENT IN AN ORGANIZED HEALTH CARE EDUCATION/TRAINING PROGRAM

## 2022-02-28 PROCEDURE — 36415 COLL VENOUS BLD VENIPUNCTURE: CPT | Performed by: STUDENT IN AN ORGANIZED HEALTH CARE EDUCATION/TRAINING PROGRAM

## 2022-02-28 PROCEDURE — 84100 ASSAY OF PHOSPHORUS: CPT | Performed by: STUDENT IN AN ORGANIZED HEALTH CARE EDUCATION/TRAINING PROGRAM

## 2022-02-28 PROCEDURE — 86140 C-REACTIVE PROTEIN: CPT | Performed by: STUDENT IN AN ORGANIZED HEALTH CARE EDUCATION/TRAINING PROGRAM

## 2022-02-28 PROCEDURE — 99239 PR HOSPITAL DISCHARGE DAY,>30 MIN: ICD-10-PCS | Mod: GC,,, | Performed by: INTERNAL MEDICINE

## 2022-02-28 PROCEDURE — 63600175 PHARM REV CODE 636 W HCPCS: Performed by: INTERNAL MEDICINE

## 2022-02-28 PROCEDURE — 85025 COMPLETE CBC W/AUTO DIFF WBC: CPT | Performed by: STUDENT IN AN ORGANIZED HEALTH CARE EDUCATION/TRAINING PROGRAM

## 2022-02-28 PROCEDURE — 80053 COMPREHEN METABOLIC PANEL: CPT | Performed by: STUDENT IN AN ORGANIZED HEALTH CARE EDUCATION/TRAINING PROGRAM

## 2022-02-28 PROCEDURE — 63600175 PHARM REV CODE 636 W HCPCS: Performed by: STUDENT IN AN ORGANIZED HEALTH CARE EDUCATION/TRAINING PROGRAM

## 2022-02-28 PROCEDURE — 99239 HOSP IP/OBS DSCHRG MGMT >30: CPT | Mod: GC,,, | Performed by: INTERNAL MEDICINE

## 2022-02-28 PROCEDURE — 25000003 PHARM REV CODE 250: Performed by: INTERNAL MEDICINE

## 2022-02-28 PROCEDURE — 63600175 PHARM REV CODE 636 W HCPCS

## 2022-02-28 RX ORDER — ONDANSETRON 4 MG/1
4 TABLET, ORALLY DISINTEGRATING ORAL EVERY 6 HOURS PRN
Qty: 30 TABLET | Refills: 3 | Status: SHIPPED | OUTPATIENT
Start: 2022-02-28 | End: 2022-03-04 | Stop reason: SDUPTHER

## 2022-02-28 RX ORDER — SIMETHICONE 80 MG
80 TABLET,CHEWABLE ORAL 3 TIMES DAILY PRN
Qty: 30 TABLET | Refills: 2 | Status: SHIPPED | OUTPATIENT
Start: 2022-02-28 | End: 2022-03-30

## 2022-02-28 RX ORDER — EZETIMIBE 10 MG/1
10 TABLET ORAL NIGHTLY
Qty: 90 TABLET | Refills: 3 | Status: ON HOLD | OUTPATIENT
Start: 2022-02-28 | End: 2022-01-01 | Stop reason: SDUPTHER

## 2022-02-28 RX ORDER — ENOXAPARIN SODIUM 100 MG/ML
40 INJECTION SUBCUTANEOUS EVERY 24 HOURS
Status: CANCELLED | OUTPATIENT
Start: 2022-02-28

## 2022-02-28 RX ORDER — CEFDINIR 300 MG/1
300 CAPSULE ORAL 2 TIMES DAILY
Qty: 60 CAPSULE | Refills: 0 | Status: SHIPPED | OUTPATIENT
Start: 2022-02-28 | End: 2022-03-30

## 2022-02-28 RX ORDER — METRONIDAZOLE 500 MG/1
500 TABLET ORAL EVERY 8 HOURS
Qty: 90 TABLET | Refills: 0 | Status: SHIPPED | OUTPATIENT
Start: 2022-02-28 | End: 2022-03-30

## 2022-02-28 RX ORDER — ONDANSETRON 2 MG/ML
4 INJECTION INTRAMUSCULAR; INTRAVENOUS ONCE
Status: DISCONTINUED | OUTPATIENT
Start: 2022-02-28 | End: 2022-02-28

## 2022-02-28 RX ORDER — POTASSIUM CHLORIDE 20 MEQ/1
20 TABLET, EXTENDED RELEASE ORAL ONCE
Status: COMPLETED | OUTPATIENT
Start: 2022-02-28 | End: 2022-02-28

## 2022-02-28 RX ORDER — ONDANSETRON 2 MG/ML
4 INJECTION INTRAMUSCULAR; INTRAVENOUS ONCE
Status: COMPLETED | OUTPATIENT
Start: 2022-02-28 | End: 2022-02-28

## 2022-02-28 RX ORDER — TRAMADOL HYDROCHLORIDE 50 MG/1
50 TABLET ORAL EVERY 12 HOURS PRN
Qty: 14 TABLET | Refills: 0 | Status: SHIPPED | OUTPATIENT
Start: 2022-02-28 | End: 2022-03-07

## 2022-02-28 RX ORDER — ONDANSETRON 4 MG/1
4 TABLET, ORALLY DISINTEGRATING ORAL EVERY 6 HOURS PRN
Qty: 14 TABLET | Refills: 3 | Status: CANCELLED | OUTPATIENT
Start: 2022-02-28 | End: 2022-03-14

## 2022-02-28 RX ORDER — ONDANSETRON 4 MG/1
4 TABLET, ORALLY DISINTEGRATING ORAL EVERY 6 HOURS PRN
Status: DISCONTINUED | OUTPATIENT
Start: 2022-02-28 | End: 2022-02-28 | Stop reason: HOSPADM

## 2022-02-28 RX ORDER — MAGNESIUM SULFATE HEPTAHYDRATE 40 MG/ML
2 INJECTION, SOLUTION INTRAVENOUS ONCE
Status: COMPLETED | OUTPATIENT
Start: 2022-02-28 | End: 2022-02-28

## 2022-02-28 RX ORDER — POLYETHYLENE GLYCOL 3350 17 G/17G
17 POWDER, FOR SOLUTION ORAL DAILY
Qty: 510 G | Refills: 0 | Status: SHIPPED | OUTPATIENT
Start: 2022-03-01 | End: 2022-03-31

## 2022-02-28 RX ADMIN — PANTOPRAZOLE SODIUM 40 MG: 40 TABLET, DELAYED RELEASE ORAL at 08:02

## 2022-02-28 RX ADMIN — Medication 1 CAPSULE: at 08:02

## 2022-02-28 RX ADMIN — CEFEPIME 1 G: 1 INJECTION, POWDER, FOR SOLUTION INTRAMUSCULAR; INTRAVENOUS at 08:02

## 2022-02-28 RX ADMIN — ONDANSETRON 4 MG: 4 TABLET, ORALLY DISINTEGRATING ORAL at 09:02

## 2022-02-28 RX ADMIN — SIMETHICONE 80 MG: 80 TABLET, CHEWABLE ORAL at 07:02

## 2022-02-28 RX ADMIN — ONDANSETRON 4 MG: 2 INJECTION INTRAMUSCULAR; INTRAVENOUS at 04:02

## 2022-02-28 RX ADMIN — PRASUGREL 10 MG: 10 TABLET, FILM COATED ORAL at 08:02

## 2022-02-28 RX ADMIN — METRONIDAZOLE 500 MG: 500 TABLET ORAL at 08:02

## 2022-02-28 RX ADMIN — MAGNESIUM SULFATE IN WATER 2 G: 40 INJECTION, SOLUTION INTRAVENOUS at 09:02

## 2022-02-28 RX ADMIN — POTASSIUM CHLORIDE 20 MEQ: 1500 TABLET, EXTENDED RELEASE ORAL at 08:02

## 2022-02-28 RX ADMIN — ASPIRIN 81 MG: 81 TABLET, COATED ORAL at 08:02

## 2022-02-28 NOTE — DISCHARGE SUMMARY
"[facility] - Telemetry [facility] Medicine  Discharge Summary      Patient Name: Odette Hart  MRN: [id number]  Patient Class: IP- Inpatient  Admission Date: 2/25/2022  Hospital Length of Stay: 2 days  Discharge Date and Time:  02/28/2022 12:48 PM  Attending Physician: Wesley Gottlieb MD   Discharging Provider: Zeina Pelayo DO  Primary Care Provider: Braxton Barragan MD  Hospital Medicine Team: [facility] Zeina Pelayo DO    HPI:   66 year old female with PMH of multivessel CAD/MI s/p PCI (last 1/24/22), HFrEF with EF 20% s/p AICD, arthritis, HLD, HTN who presents due to abdominal pain and concerns for "fistula". Patient reports she has been having green foul smelling vaginal discharge and the feeling of "air in urine" for the past year. Has never been worked up. Two days ago she developed worsening left sided/suprapubic abdominal pain with associated fever of 102, nausea, vomiting yesterday, and dysuria. She also noticed "stool on tissue after wiping". Due to worsening symptoms she presented to ED    In the ED afebrile but soft Bps. Labs with no leukocytosis, normal kidney function, UA appears infectious, LA WNL, trop 0.032. EKG with no acute ischemic changes when compared to prior. CT AP with contrast concerning for diverticulits of sigmoid and concern for vesicovaginal vs colorectal vs colovesical fistula. CRS consulted. She was given 1L IVFs, dose of cefepime,and admitted to .        * No surgery found *      Hospital Course:   Imaging shows acute diverticulitis, consistent with patient's LLQ abdominal pain. Also shows evidence of a colovesicular fistula with possible vaginal involvement. She was started on cefepime and flagyl with rapid improvement of symptoms and CRP. She was evaluated by CRS who recommend a coloscopy for further insight to where the tract is going and surgical fixation. However, due to her history of very recently HEVER placement, it was decided that the best option is to " manage medically for now, with a long course of antibiotics, and pursue colonoscopy and surgery on an outpatient basis. Patient agrees with this plan as she was very anxious about undergoing anesthesia after her recent PCI. She has no history of ESBL on prior urine cx, so we will treat with cefdinir and flagyl (no quinolone due to her extensive cardiac history) for one month.       Objective:      Vital Signs (Most Recent):  Temp: 97.5 °F (36.4 °C) (02/27/22 1115)  Pulse: 71 (02/27/22 1204)  Resp: 20 (02/27/22 1115)  BP: (!) 125/58 (02/27/22 1115)  SpO2: 98 % (02/27/22 1211)    Vital Signs (24h Range):  Temp:  [97.5 °F (36.4 °C)-98.4 °F (36.9 °C)] 97.5 °F (36.4 °C)  Pulse:  [60-74] 71  Resp:  [18-20] 20  SpO2:  [96 %-100 %] 98 %  BP: (102-125)/(52-59) 125/58      Weight: 59 kg (130 lb 1.1 oz)  Body mass index is 23.04 kg/m².     Intake/Output Summary (Last 24 hours) at 2/27/2022 1212  Last data filed at 2/26/2022 2000      Gross per 24 hour   Intake 1315.9 ml   Output --   Net 1315.9 ml      Physical Exam  Constitutional:       General: She is not in acute distress.     Appearance: Normal appearance. She is not ill-appearing.   HENT:      Head: Normocephalic and atraumatic.      Mouth/Throat:      Mouth: Mucous membranes are moist.   Eyes:      Conjunctiva/sclera: Conjunctivae normal.   Cardiovascular:      Rate and Rhythm: Normal rate and regular rhythm.   Pulmonary:      Effort: Pulmonary effort is normal. No respiratory distress.      Breath sounds: Normal breath sounds. No wheezing.   Abdominal:      General: Abdomen is flat. There is no distension.      Palpations: Abdomen is soft.      Tenderness: There is abdominal tenderness (Mild LUQ/LLQ and suprabubic tenderness). There is no guarding or rebound.   Musculoskeletal:         General: Normal range of motion.      Cervical back: Normal range of motion. No rigidity.      Right lower leg: No edema.      Left lower leg: No edema.   Skin:     Capillary Refill:  Capillary refill takes less than 2 seconds.   Neurological:      General: No focal deficit present.      Mental Status: She is alert and oriented to person, place, and time.   Psychiatric:         Mood and Affect: Mood normal.       Goals of Care Treatment Preferences:  Code Status: Full Code      Consults:   Consults (From admission, onward)        Status Ordering Provider     Inpatient consult to Colorectal Surgery  Once        Provider:  (Not yet assigned)    MIMI Rubio          No new Assessment & Plan notes have been filed under this hospital service since the last note was generated.  Service: Hospital Medicine    Final Active Diagnoses:    Diagnosis Date Noted POA    PRINCIPAL PROBLEM:  Acute diverticulitis [K57.92] 01/12/2021 Yes    Colovesical fistula [N32.1] 02/26/2022 Yes    Urinary tract infection with pyuria [N39.0] 02/26/2022 Yes    Leukopenia [D72.819] 02/26/2022 Yes    Chronic combined systolic and diastolic congestive heart failure [I50.42] 02/17/2022 Yes    History of myocardial infarction [I25.2] 01/24/2022 Not Applicable    Primary hypertension [I10] 01/24/2022 Yes    Normocytic anemia [D64.9] 01/23/2022 Yes    Familial hypercholesterolemia [E78.01] 01/22/2022 Yes    Coronary artery disease of native artery of native heart with stable angina pectoris [I25.118] 04/19/2021 Yes    Ischemic cardiomyopathy [I25.5] 02/04/2019 Yes    Automatic implantable cardioverter-defibrillator in situ [Z95.810] 02/04/2019 Yes    History of coronary artery stent placement [Z95.5] 02/04/2019 Not Applicable      Problems Resolved During this Admission:       Discharged Condition: good    Disposition: Home or Self Care    Follow Up:   Follow-up Information     Braxton Barragan MD Follow up in 1 week(s).    Specialties: Internal Medicine, Pediatrics  Why: Hospital follow-up  Contact information:  [address] [place] [address] [phone]             [facility] COLON & RECTAL SURGERY  Follow up in 2 week(s).    Specialty: Colon and Rectal Surgery  Why: Hospital follow-up  Contact information:  [address]  [facility] [address] [phone]                     Patient Instructions:      Ambulatory referral/consult to Colorectal Surgery   Standing Status: Future   Referral Priority: Routine Referral Type: Consultation   Referral Reason: Specialty Services Required   Requested Specialty: Colon and Rectal Surgery   Number of Visits Requested: 1     Diet Dysphagia Mechanical Soft     Notify your health care provider if you experience any of the following:  temperature >100.4     Notify your health care provider if you experience any of the following:  persistent nausea and vomiting or diarrhea     Notify your health care provider if you experience any of the following:  severe uncontrolled pain     Activity as tolerated       Significant Diagnostic Studies: Labs:   CBC   Recent Labs   Lab 02/27/22  0232 02/28/22  0326   WBC 3.31* 2.83*   HGB 10.0* 11.0*   HCT 31.7* 34.0*    232       Pending Diagnostic Studies:     Procedure Component Value Units Date/Time    Lactic acid, plasma #2 [[id number]] Collected: 02/25/22 1710    Order Status: Sent Lab Status: In process Updated: 02/25/22 1710    Specimen: Blood          Medications:  Reconciled Home Medications:      Medication List      START taking these medications    cefdinir 300 MG capsule  Commonly known as: OMNICEF  Take 1 capsule (300 mg total) by mouth 2 (two) times daily.     Lactobacillus rhamnosus GG 10 billion cell capsule  Commonly known as: CULTURELLE  Take 1 capsule by mouth 2 (two) times daily.     metroNIDAZOLE 500 MG tablet  Commonly known as: FLAGYL  Take 1 tablet (500 mg total) by mouth every 8 (eight) hours.     ondansetron 4 MG Tbdl  Commonly known as: ZOFRAN-ODT  Take 1 tablet (4 mg total) by mouth every 6 (six) hours as needed (nausea).     polyethylene glycol 17 gram/dose powder  Commonly known as: GLYCOLAX  Mix  one capful (17 grams) with liquid and drink by mouth once daily.  Start taking on: March 1, 2022     simethicone 80 MG chewable tablet  Commonly known as: MYLICON  Take 1 tablet (80 mg total) by mouth 3 (three) times daily as needed for Flatulence.     traMADoL 50 mg tablet  Commonly known as: ULTRAM  Take 1 tablet (50 mg total) by mouth every 12 (twelve) hours as needed for Pain (Severe pain that does not respond to tylenol; DO NOT TAKE WITH XANAX).        CHANGE how you take these medications    ezetimibe 10 mg tablet  Commonly known as: ZETIA  Take 1 tablet (10 mg total) by mouth every evening.  What changed: when to take this        CONTINUE taking these medications    acetaminophen 500 MG tablet  Commonly known as: TYLENOL  Take 2 tablets (1,000 mg total) by mouth every 8 (eight) hours as needed for Pain.     albuterol 90 mcg/actuation inhaler  Commonly known as: PROVENTIL/VENTOLIN HFA  Inhale 2 puffs into the lungs every 6 (six) hours as needed for Wheezing. Rescue     ALPRAZolam 0.5 MG tablet  Commonly known as: XANAX  Take 1 tablet (0.5 mg total) by mouth nightly as needed for Anxiety.     aspirin 81 MG EC tablet  Commonly known as: ECOTRIN  Take 1 tablet (81 mg total) by mouth once daily.     carvediloL 12.5 MG tablet  Commonly known as: COREG  Take 1 tablet (12.5 mg total) by mouth 2 (two) times daily with meals.     fluticasone propionate 50 mcg/actuation nasal spray  Commonly known as: FLONASE  1 spray (50 mcg total) by Each Nostril route once daily.     nitroGLYCERIN 0.4 MG SL tablet  Commonly known as: NITROSTAT  Place 1 tablet (0.4 mg total) under the tongue every 5 (five) minutes as needed for Chest pain.     omeprazole 40 MG capsule  Commonly known as: PRILOSEC  Take 1 capsule (40 mg total) by mouth once daily.     prasugreL 10 mg Tab  Commonly known as: EFFIENT  Take 1 tablet (10 mg total) by mouth once daily.     spironolactone 25 MG tablet  Commonly known as: ALDACTONE  Take 1 tablet (25 mg  total) by mouth once daily.        STOP taking these medications    LIDOcaine HCl 2% 2 % Soln  Commonly known as: LIDOcaine VISCOUS     predniSONE 5 MG tablet  Commonly known as: DELTASONE            Indwelling Lines/Drains at time of discharge:   Lines/Drains/Airways     None                 Time spent on the discharge of patient: 35 minutes         Zeina Pelayo DO  Department of Hospital Medicine  [facility] - Telemetry Stepdown

## 2022-02-28 NOTE — PLAN OF CARE
[facility] - Telemetry Stepdown  Discharge Final Note    Primary Care Provider: Braxton Barragan MD    Expected Discharge Date: 2/28/2022    Final Discharge Note (most recent)     Final Note - 02/28/22 1613        Final Note    Assessment Type Final Discharge Note     Anticipated Discharge Disposition Home or Self Care     Hospital Resources/Appts/Education Provided Provided patient/caregiver with written discharge plan information        Post-Acute Status    Post-Acute Authorization Other     Other Status No Post-Acute Service Needs     Discharge Delays None known at this time                 Important Message from Medicare  Important Message from Medicare regarding Discharge Appeal Rights: Given to patient/caregiver, Explained to patient/caregiver, Signed/date by patient/caregiver     Date IMM was signed: 02/28/22  Time IMM was signed: 1037    Contact Info     Braxton Barragan MD   Specialty: Internal Medicine, Pediatrics   Relationship: PCP - General    [address] [place] [address]   Phone: [phone]       Next Steps: Follow up in 1 week(s)    Instructions: Hospital follow-up    [facility] COLON & RECTAL SURGERY   Specialty: Colon and Rectal Surgery    [address]  [facility] [address]   Phone: [phone]       Next Steps: Follow up in 2 week(s)    Instructions: Hospital follow-up        Future Appointments   Date Time Provider Department Center   3/3/2022 10:15 AM Braxton Barragan MD [facility]   4/1/2022  1:20 PM Zoila Rodriguez NP-SANDRA [facility] OBG[facility]   4/7/2022  2:40 PM Lucio Sotelo MD [facility] GASTR[facility]   4/20/2022 11:30 AM Freddy Arenas MD [facility] CARDIO [facility]   6/3/2022 11:00 AM Manju Mcduffie MD [facility]     No Social Service needs identified at this time.     Tati Gonzalez LMSW  PRN-  [facility]  Ext. [phone]

## 2022-02-28 NOTE — PLAN OF CARE
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]  SIRS (systemic inflammatory response syndrome) [R65.10]  Abdominal pain [R10.9]  Chest pain [R07.9]  Urinary tract infection with hematuria, site unspecified [N39.0, R31.9]    Admission Date: 2/25/2022  Expected Discharge Date: 2/28/2022    Discharge Barriers Identified: None    Payor: [employer] / Plan: "[employer] 65 / Product Type: Medicare Advantage /     Extended Emergency Contact Information  Primary Emergency Contact: Telly Ortiz  Address: [address] [place] [address] [facility]  Home Phone: [phone]  Mobile Phone: [phone]  Relation: Son  Preferred language: English   needed? No    Discharge Plan A: Home with family  Discharge Plan B: Home Health, Home with family      [address] [facility]  [address] [address]  Phone: [phone] Fax: [phone]    [facility]  [address] [facility] [address]  [facility] [address]  Phone: [phone] Fax: [phone]      Initial Assessment (most recent)     Adult Discharge Assessment - 02/28/22 1036        Discharge Assessment    Confirmed/corrected address, phone number and insurance Yes     Confirmed Demographics Correct on Facesheet     Source of Information patient     Communicated SIVAN with patient/caregiver Yes     Lives With alone     Do you expect to return to your current living situation? No   going to sister in law's home    Prior to hospitilization cognitive status: Alert/Oriented     Current cognitive status: Alert/Oriented     Home Layout Able to live on 1st floor     Equipment Currently Used at Home none     Patient currently being followed by outpatient case management? No     Do you currently have service(s) that help you manage your  care at home? No     Do you take prescription medications? Yes     Do you have prescription coverage? Yes     Do you have any problems affording any of your prescribed medications? No     Is the patient taking medications as prescribed? yes     How do you get to doctors appointments? car, drives self;family or friend will provide     Are you on dialysis? No     Do you take coumadin? No     Discharge Plan A Home with family     Discharge Plan B Home Health;Home with family     DME Needed Upon Discharge  none     Discharge Plan discussed with: Patient     Discharge Barriers Identified None               Patient lives alone, but will be going to her sister-in-law's home until she can care for herself. Patient does not feel she will need any post acute services upon hospital discharge. Family will provide transportation home.

## 2022-02-28 NOTE — NURSING
Patient AAOx4.  Family member at bedside.  Discharge instructions reviewed with patient and family. Questions asked and answered.  Hard copy prescription for tramadol given to patient.  IV removed from right wrist with tip intact.  No complications noted.  Patient ambulated off floor with family member at side.  All personal belongings with patient.  No distress noted.

## 2022-02-28 NOTE — PROGRESS NOTES
Colon & Rectal Surgery Progress Note    Subjective:         Tolerated full liquids yesterday but some bloating overnight and slight nausea.  Feeling better this morning and pain improving    Current Facility-Administered Medications   Medication Dose Route Frequency Provider Last Rate Last Admin    acetaminophen tablet 650 mg  650 mg Oral Q6H PRN Zeina Pelayo DO   650 mg at 02/27/22 0220    albuterol inhaler 2 puff  2 puff Inhalation Q6H PRN Evens Talley MD        aspirin EC tablet 81 mg  81 mg Oral Daily Evens Talley MD   81 mg at 02/28/22 0833    cefepime in dextrose 5 % 1 gram/50 mL IVPB 1 g  1 g Intravenous Q8H Wesley Gottlieb  mL/hr at 02/28/22 0832 1 g at 02/28/22 0832    dextrose 10% bolus 125 mL  12.5 g Intravenous PRN Evens Talley MD        dextrose 10% bolus 250 mL  25 g Intravenous PRN Evens Talley MD        ezetimibe tablet 10 mg  10 mg Oral QHS Evens Talley MD   10 mg at 02/27/22 2122    glucagon (human recombinant) injection 1 mg  1 mg Intramuscular PRN Evens Talley MD        glucose chewable tablet 16 g  16 g Oral PRN Evens Talley MD        glucose chewable tablet 24 g  24 g Oral PRN Evens Talley MD        Lactobacillus rhamnosus GG capsule 1 capsule  1 capsule Oral BID Wesley Gottlieb MD   1 capsule at 02/28/22 0833    magnesium sulfate 2g in water 50mL IVPB (premix)  2 g Intravenous Once Zeina Pelayo DO        metroNIDAZOLE tablet 500 mg  500 mg Oral Q8H Evens Talley MD   500 mg at 02/28/22 0833    naloxone 0.4 mg/mL injection 0.02 mg  0.02 mg Intravenous PRN Evens Talley MD        ondansetron disintegrating tablet 4 mg  4 mg Oral Q6H PRN Zeina Pelayo DO        oxyCODONE immediate release tablet 5 mg  5 mg Oral Q6H PRN Wesley Gottlieb MD        pantoprazole EC tablet 40 mg  40 mg Oral Daily Evens Talley MD   40 mg at 02/28/22 0833    polyethylene glycol packet 17 g  17 g Oral Daily Parth Grajeda MD        prasugreL tablet 10 mg  10 mg Oral Daily Zeina  DO Gita   10 mg at 02/28/22 0834    simethicone chewable tablet 80 mg  1 tablet Oral TID PRN Parth Grajeda MD   80 mg at 02/28/22 0751    sodium chloride 0.9% flush 10 mL  10 mL Intravenous Q12H PRN Evens Talley MD        spironolactone tablet 25 mg  25 mg Oral Daily Parth Grajeda MD               Objective:      General:  Alert and oriented no acute distress  CV:  Normal rate, good peripheral perfusion  Pulmonary:  No increased work of breathing, symmetrical expansion  GI:  Abdomen soft, mildly distended, mild TTP LLQ and suprapubic region  Neuro:  Alert and oriented x3, no focal deficits  Psych:  Cooperative, appropriate mood and affect          Lab Results   Component Value Date    WBC 2.83 (L) 02/28/2022    HGB 11.0 (L) 02/28/2022    HCT 34.0 (L) 02/28/2022    MCV 83 02/28/2022     02/28/2022     BMP  Lab Results   Component Value Date     (L) 02/28/2022    K 3.9 02/28/2022     02/28/2022    CO2 25 02/28/2022    BUN 7 (L) 02/28/2022    CREATININE 0.7 02/28/2022    CALCIUM 8.5 (L) 02/28/2022    ANIONGAP 8 02/28/2022    ESTGFRAFRICA >60.0 02/28/2022    EGFRNONAA >60.0 02/28/2022     CMP  Sodium   Date Value Ref Range Status   02/28/2022 134 (L) 136 - 145 mmol/L Final     Potassium   Date Value Ref Range Status   02/28/2022 3.9 3.5 - 5.1 mmol/L Final     Chloride   Date Value Ref Range Status   02/28/2022 101 95 - 110 mmol/L Final     CO2   Date Value Ref Range Status   02/28/2022 25 23 - 29 mmol/L Final     Glucose   Date Value Ref Range Status   02/28/2022 94 70 - 110 mg/dL Final     BUN   Date Value Ref Range Status   02/28/2022 7 (L) 8 - 23 mg/dL Final     Creatinine   Date Value Ref Range Status   02/28/2022 0.7 0.5 - 1.4 mg/dL Final     Calcium   Date Value Ref Range Status   02/28/2022 8.5 (L) 8.7 - 10.5 mg/dL Final     Total Protein   Date Value Ref Range Status   02/28/2022 6.7 6.0 - 8.4 g/dL Final     Albumin   Date Value Ref Range Status   02/28/2022 2.1 (L) 3.5 - 5.2 g/dL  Final     Total Bilirubin   Date Value Ref Range Status   02/28/2022 0.6 0.1 - 1.0 mg/dL Final     Comment:     For infants and newborns, interpretation of results should be based  on gestational age, weight and in agreement with clinical  observations.    Premature Infant recommended reference ranges:  Up to 24 hours.............<8.0 mg/dL  Up to 48 hours............<12.0 mg/dL  3-5 days..................<15.0 mg/dL  6-29 days.................<15.0 mg/dL       Alkaline Phosphatase   Date Value Ref Range Status   02/28/2022 100 55 - 135 U/L Final     AST   Date Value Ref Range Status   02/28/2022 16 10 - 40 U/L Final     ALT   Date Value Ref Range Status   02/28/2022 13 10 - 44 U/L Final     Anion Gap   Date Value Ref Range Status   02/28/2022 8 8 - 16 mmol/L Final     eGFR if    Date Value Ref Range Status   02/28/2022 >60.0 >60 mL/min/1.73 m^2 Final     eGFR if non    Date Value Ref Range Status   02/28/2022 >60.0 >60 mL/min/1.73 m^2 Final     Comment:     Calculation used to obtain the estimated glomerular filtration  rate (eGFR) is the CKD-EPI equation.        No results found for: CEA        Assessment:       1. SIRS (systemic inflammatory response syndrome)    2. Abdominal pain    3. Urinary tract infection with hematuria, site unspecified    4. Fistula of vagina    5. Chest pain    6. Acute diverticulitis    7. Colovesical fistula    8. Urinary tract infection with pyuria    9. Other neutropenia    10. Automatic implantable cardioverter-defibrillator in situ    11. History of myocardial infarction    12. Primary hypertension    13. Familial hypercholesterolemia    14. Normocytic anemia    15. Chronic combined systolic and diastolic congestive heart failure    16. Coronary artery disease of native artery of native heart with stable angina pectoris    17. History of coronary artery stent placement    18. Ischemic cardiomyopathy        Plan:       66F with complicated cardiac  history including recent PCI on dual antiplatelet therapy admitted with recurrent diverticulitis c/b colovesical fistula, with possible vaginal involvement. Clinically, she appears stable on exam and given need for anticoagulation, will plan for outpatient colonoscopy in the near future and surgical intervention once cleared by Cardiology.     Recommendations:   -advance to low residue diet  -continue antibiotics, may convert to oral regimen  -will recommend suppressive antibiotics given recurring UTIs (Macrobid, Bactrim, or ciprofloxacin) on discharge    Disposition:  Medicine primary.  Colorectal surgery will continue to follow    Brien Morin MD  Fellow  Colon & Rectal Surgery    This note was created using voice recognition software and may contain some unrecognized transcriptional errors.

## 2022-02-28 NOTE — NURSING
"Patient refused miralax and aldactone this AM.  She states she does not want to take the miralax r/t she may be discharging and has a long ride home. She refused the aldactone r/t she only takes prn at home, and does not feel like she has any "extra fluid"  "

## 2022-02-28 NOTE — MEDICAL/APP STUDENT
Subjective:       Patient ID: Odette Hart is a 66 y.o. female with PMHx of CAD/MI s/p PCI (1/24/22), HFrEF (EF of 20%) s/p AICD, HTN, HLD, arthritis.     Chief Complaint: Abdominal Pain (X 2 days; pt reports foul discharge from vagina. She has a diagnosed fistula. )    On admission Ms. Hart complained of abdominal pain, nausea, vomiting, and dysuria. She had a fever of 102 at home, afebrile in ED. She also complained of foul-smelling green vaginal discharge and passing air when urinating. She was treated conservatively for SIRS 2/2 to diverticulitis/UTI with cefepime and metronidazole. CT abdomen was suggestive of diverticulitis. Urine cultures grew many organisms with none in predominance. Blood cultures show no growth to date x 3 days. Based on symptoms, colovesical fistula with possible vaginal involvement is presumed.     Today she feels bloated and is passing gas, but has not had a bowel movement in 2 days. She felt nauseated after taking metronidazole and a probiotic last night and again this morning. The nausea improves on ondansetron. She is tolerating soft foods. She continues to experience lower abdominal pain, especially in the LLQ. She also still reports green, foul-smelling vaginal discharge without dysuria.      Review of Systems   Constitutional: Negative.    HENT: Negative.    Eyes: Negative.    Respiratory: Negative.    Cardiovascular: Negative.    Gastrointestinal: Positive for abdominal distention.   Endocrine: Negative.    Genitourinary: Positive for vaginal discharge.        Green, foul-smelling discharge still present   Musculoskeletal: Negative.    Integumentary:  Negative.   Allergic/Immunologic: Negative.    Neurological: Negative.    Hematological: Negative.    Psychiatric/Behavioral: Negative.          Objective:      Physical Exam  Constitutional:       Appearance: Normal appearance. She is normal weight.   Cardiovascular:      Rate and Rhythm: Normal rate and regular rhythm.       Pulses: Normal pulses.      Heart sounds: Normal heart sounds.   Pulmonary:      Effort: Pulmonary effort is normal.      Breath sounds: Normal breath sounds.   Abdominal:      General: Bowel sounds are normal. There is distension.      Palpations: Abdomen is soft.      Tenderness: There is abdominal tenderness in the suprapubic area and left lower quadrant. There is guarding.      Comments: Voluntary guarding in the LLQ   Musculoskeletal:         General: Normal range of motion.   Skin:     General: Skin is warm and dry.   Neurological:      General: No focal deficit present.      Mental Status: She is alert and oriented to person, place, and time.   Psychiatric:         Mood and Affect: Mood normal.         Behavior: Behavior normal.         Abnormal labs:   WBC = 2.83 (vs. 3.31 yesterday)  Hgb = 11.0  Hct = 34.0  Na+ = 134  Albumin = 2.1  CRP = 49 (vs. 66.2 yesterday)     Urine culture: multiple organisms, none in predominance  Blood culture: no growth to date x3 days     Assessment:       Problem List Items Addressed This Visit        Cardiac/Vascular    Ischemic cardiomyopathy    Automatic implantable cardioverter-defibrillator in situ    History of coronary artery stent placement    Coronary artery disease of native artery of native heart with stable angina pectoris    Familial hypercholesterolemia    History of myocardial infarction    Primary hypertension    Chronic combined systolic and diastolic congestive heart failure       Renal/    Colovesical fistula    Relevant Orders    C-reactive protein (Completed)    Urinary tract infection with pyuria       Oncology    Normocytic anemia    Leukopenia       GI    * (Principal) Acute diverticulitis    Relevant Orders    C-reactive protein      Other Visit Diagnoses     SIRS (systemic inflammatory response syndrome)    -  Primary    Relevant Orders    EKG 12-lead (Completed)    Abdominal pain        Relevant Orders    EKG 12-lead (Completed)    Urinary tract  infection with hematuria, site unspecified        Fistula of vagina        Chest pain        Relevant Orders    EKG 12-lead          Plan:       Acute diverticulitis :   Discontinue IV cefepime, change to cefdinir PO BID x 30 days. Continue metronidazole TID x 30 days. Patient declines probiotic and will incorporate yogurt into diet. She will take OTC polyethylene glycol to avoid constipation and straining bowels. Follow-up with CRS outpatient in 2 weeks for colonoscopy. Surgery is deferred until patient can safely discontinue DAPT s/p PCI with drug-eluting stent (1/24/22).     HFrEF :   AICD in place. Continue home ASA, prasugrel, ezetimibe, PRN spironolactone.    Primary hypertension :   Continue home spironolactone PRN.     Familial hypercholesterolemia :   Continue home ezetimibe.     The patient is medically ready for discharge.

## 2022-03-02 ENCOUNTER — PATIENT OUTREACH (OUTPATIENT)
Dept: ADMINISTRATIVE | Facility: CLINIC | Age: 67
End: 2022-03-02
Payer: MEDICARE

## 2022-03-02 ENCOUNTER — TELEPHONE (OUTPATIENT)
Dept: SURGERY | Facility: CLINIC | Age: 67
End: 2022-03-02
Payer: MEDICARE

## 2022-03-02 LAB
BACTERIA BLD CULT: NORMAL
BACTERIA BLD CULT: NORMAL

## 2022-03-02 NOTE — PROGRESS NOTES
C3 nurse attempted to contact Odette Hart for a TCC post hospital discharge follow up call. No answer. Left voicemail with callback information. The patient has a scheduled HOSFU appointment with Dr. Barragan on 3/14/2022 @ 11:45 am.       C3 nurse attempted to contact Odette Hart for a TCC post hospital discharge follow up call. No answer. Left voicemail with callback information. The patient has a scheduled HOSFU appointment with Dr. Barragan on 3/14/2022 @ 11:45 am.     Patient transferred to triage due to  DC on 2/28 for acute diverticulitis, has been constantly throwing up and is SOB, unable to hold anything down. Will check back with patient later.     C3 nurse spoke with Odette Hart for a TCC post hospital discharge follow up call. The patient has a scheduled HOSFU appointment with Dr. Barragan on 3/14/2022 @ 11:45 am.

## 2022-03-03 ENCOUNTER — PATIENT MESSAGE (OUTPATIENT)
Dept: ADMINISTRATIVE | Facility: CLINIC | Age: 67
End: 2022-03-03
Payer: MEDICARE

## 2022-03-03 ENCOUNTER — NURSE TRIAGE (OUTPATIENT)
Dept: ADMINISTRATIVE | Facility: CLINIC | Age: 67
End: 2022-03-03
Payer: MEDICARE

## 2022-03-03 NOTE — PHYSICIAN QUERY
PT Name: Odette Hart  MR #: [id number]     DOCUMENTATION CLARIFICATION     CDS: Brandy Capley, RN  Email: [email or web address]    This form is a permanent document in the medical record.     Query Date: March 3, 2022    By submitting this query, we are merely seeking further clarification of documentation.  Please utilize your independent clinical judgment when addressing the question(s) below.  The Medical Record contains the following:  Indicators Supporting Clinical Findings Location in Medical Record   X HR         RR          BP        Temp   Reports a fever of 102° at home yesterday.    Vital Signs (24h Range):  Temp:  [97.6 °F (36.4 °C)-98.8 °F (37.1 °C)] 98.8 °F (37.1 °C)  Pulse:  [60-78] 60  Resp:  [17-24] 18  SpO2:  [95 %-100 %] 100 %  BP: ()/(38-56) 100/49    Vital Signs (24h Range):  Temp:  [97.5 °F (36.4 °C)-98.4 °F (36.9 °C)] 97.5 °F (36.4 °C)  Pulse:  [60-74] 71  Resp:  [18-20] 20  SpO2:  [96 %-100 %] 98 %  BP: (102-125)/(52-59) 125/58     ED provider notes 2/25, filed 2/28    H&P 2/26              Hospital medicine progress notes 2/27   X Lactic Acid          Procalcitonin  02/25/22 17:10 02/25/22 20:02   Lactate, Robbie 1.6  0.9       Labs 2/25   X WBC           Bands          CRP     02/25/22 17:10 02/26/22 04:50 02/27/22 02:32 02/28/22 03:26   WBC 3.90 3.19 (L) 3.31 (L) 2.83 (L)      02/27/22 11:53 02/28/22 03:26   CRP 66.2 (H) 49.0 (H)        Labs 2/25-2/28   X Culture(s) No growth after 5 days    Multiple organisms isolated. None in predominance.  Repeat if clinically necessary.   Blood cultures X 2 sets 2/25    Urine culture 2/25    AMS, Confusion, LOC, etc.      Organ Dysfunction/Failure     X Bacteremia or Sepsis / Septic Sepsis secondary to urinary tract infection, concern for colovesical fistula Colon and rectal surgery consult 2/26, filed 2/28   X Known or Suspected Source of Infection documented * Acute diverticulitis  CT scan with acute diverticulitis of sigmoid    Colovesical  fistula    Urinary tract infection with pyuria  UA appears consistent with infection      - cefepime as above  - follow up urine and blood cultures    H&P 2/26    (Failed) Outpatient Treatment     X Medication DC to home with 30 days of PO antibiotics due to diverticulitis with colovesical fistula.     She has no history of ESBL on prior urine cx, so we will treat with cefdinir and flagyl (no quinolone due to her extensive cardiac history) for one month.   Discharge summary 2/28    Treatment     X Other SIRS (systemic inflammatory response syndrome)    Two days ago she developed worsening left sided/suprapubic abdominal pain with associated fever of 102, nausea, vomiting yesterday, and dysuria.    ED provider notes 2/25, filed 2/28    Discharge summary 2/28        Provider, please clarify infectious process:    [   ] SIRS with infection but without Sepsis     [   ] Sepsis due to unknown organism     [   ] Sepsis due to (suspected) organism (please specify): __________     [X   ] Other Infectious Disease (please specify): No sepsis. Acute diverticulitis with colovesical fistula     [  ] Clinically Undetermined         Please document in your progress notes daily for the duration of treatment until resolved and include in your discharge summary.

## 2022-03-03 NOTE — TELEPHONE ENCOUNTER
Message forwarded to MD - pt. Is complaining of vomiting may need zofran - tried calling pt. No answer left a VM to return my call

## 2022-03-04 ENCOUNTER — TELEPHONE (OUTPATIENT)
Dept: PRIMARY CARE CLINIC | Facility: CLINIC | Age: 67
End: 2022-03-04
Payer: MEDICARE

## 2022-03-04 DIAGNOSIS — R11.2 NAUSEA AND VOMITING, INTRACTABILITY OF VOMITING NOT SPECIFIED, UNSPECIFIED VOMITING TYPE: Primary | ICD-10-CM

## 2022-03-04 RX ORDER — ONDANSETRON 4 MG/1
4 TABLET, ORALLY DISINTEGRATING ORAL EVERY 6 HOURS PRN
Qty: 20 TABLET | Refills: 1 | Status: SHIPPED | OUTPATIENT
Start: 2022-03-04 | End: 2022-04-03

## 2022-03-05 ENCOUNTER — NURSE TRIAGE (OUTPATIENT)
Dept: ADMINISTRATIVE | Facility: CLINIC | Age: 67
End: 2022-03-05
Payer: MEDICARE

## 2022-03-05 NOTE — TELEPHONE ENCOUNTER
"Odette calling c/o "not feeling any better" Pt states she has a fistula and most concerned about stool being in urine. Pt also c/o lower abdominal pain rated as 8/10, burning with urination and nausea. Pt states she is on full liquid diet but has had several soft bowel movements in the past 24 hours. Pt taking prescribed antibiotics. Advised pt per triage protocol to go to nearest ED now for physician andres. Verbalized understanding.     Reason for Disposition   [1] Abdominal pain, moderate-severe AND [2] female   [1] SEVERE pain (e.g., excruciating) AND [2] present > 1 hour    Additional Information   Negative: Shock suspected (e.g., cold/pale/clammy skin, too weak to stand, low BP, rapid pulse)   Negative: Difficult to awaken or acting confused (e.g., disoriented, slurred speech)   Negative: Passed out (i.e., lost consciousness, collapsed and was not responding)   Negative: Sounds like a life-threatening emergency to the triager    Protocols used: GI MULTIPLE SYMPTOMS - GUIDELINE [id number], ABDOMINAL PAIN - FEMALE-A-AH      "

## 2022-03-07 ENCOUNTER — TELEPHONE (OUTPATIENT)
Dept: PRIMARY CARE CLINIC | Facility: CLINIC | Age: 67
End: 2022-03-07
Payer: MEDICARE

## 2022-03-07 NOTE — TELEPHONE ENCOUNTER
Spoke with pt. States she had a missed called from [facility] - not sure why? Pt. Has apt. With Dr. Barragan on 3/14 and Colorectal on 3/25 pt. Is complaining of nausea has zofran that she has pick it up. Pt. Is looking to be put in the hospital for antibiotic treatment. Currently taking flagyl and cefdinir for the stool in her urinary tract.

## 2022-03-07 NOTE — TELEPHONE ENCOUNTER
----- Message from Clarence Santoyo sent at 3/7/2022 11:07 AM CST -----  Contact: Pt [phone]  Patient is returning a phone call.  Who left a message for the patient: She doesn't know  Does patient know what this is regarding:  No

## 2022-03-09 ENCOUNTER — PATIENT MESSAGE (OUTPATIENT)
Dept: PRIMARY CARE CLINIC | Facility: CLINIC | Age: 67
End: 2022-03-09
Payer: MEDICARE

## 2022-03-15 ENCOUNTER — OFFICE VISIT (OUTPATIENT)
Dept: PRIMARY CARE CLINIC | Facility: CLINIC | Age: 67
End: 2022-03-15
Payer: MEDICARE

## 2022-03-15 ENCOUNTER — CLINICAL SUPPORT (OUTPATIENT)
Dept: CARDIOLOGY | Facility: CLINIC | Age: 67
End: 2022-03-15
Payer: MEDICARE

## 2022-03-15 ENCOUNTER — TELEPHONE (OUTPATIENT)
Dept: CARDIOLOGY | Facility: CLINIC | Age: 67
End: 2022-03-15
Payer: MEDICARE

## 2022-03-15 VITALS
OXYGEN SATURATION: 99 % | DIASTOLIC BLOOD PRESSURE: 64 MMHG | BODY MASS INDEX: 21.17 KG/M2 | HEIGHT: 63 IN | RESPIRATION RATE: 16 BRPM | WEIGHT: 119.5 LBS | SYSTOLIC BLOOD PRESSURE: 104 MMHG | HEART RATE: 77 BPM

## 2022-03-15 DIAGNOSIS — I12.9 HYPERTENSIVE CHRONIC KIDNEY DISEASE WITH STAGE 1 THROUGH STAGE 4 CHRONIC KIDNEY DISEASE, OR UNSPECIFIED CHRONIC KIDNEY DISEASE: ICD-10-CM

## 2022-03-15 DIAGNOSIS — I45.2 RBBB WITH LEFT ANTERIOR FASCICULAR BLOCK: ICD-10-CM

## 2022-03-15 DIAGNOSIS — N32.1 COLOVESICAL FISTULA: Primary | ICD-10-CM

## 2022-03-15 DIAGNOSIS — R63.4 WEIGHT LOSS: ICD-10-CM

## 2022-03-15 DIAGNOSIS — N30.00 ACUTE CYSTITIS WITHOUT HEMATURIA: ICD-10-CM

## 2022-03-15 DIAGNOSIS — I25.5 ISCHEMIC CARDIOMYOPATHY: ICD-10-CM

## 2022-03-15 DIAGNOSIS — Z95.810 AUTOMATIC IMPLANTABLE CARDIOVERTER-DEFIBRILLATOR IN SITU: Primary | ICD-10-CM

## 2022-03-15 DIAGNOSIS — I47.10 PAROXYSMAL SUPRAVENTRICULAR TACHYCARDIA: ICD-10-CM

## 2022-03-15 PROCEDURE — 1126F PR PAIN SEVERITY QUANTIFIED, NO PAIN PRESENT: ICD-10-PCS | Mod: CPTII,S$GLB,, | Performed by: INTERNAL MEDICINE

## 2022-03-15 PROCEDURE — 1160F PR REVIEW ALL MEDS BY PRESCRIBER/CLIN PHARMACIST DOCUMENTED: ICD-10-PCS | Mod: CPTII,S$GLB,, | Performed by: INTERNAL MEDICINE

## 2022-03-15 PROCEDURE — 4010F ACE/ARB THERAPY RXD/TAKEN: CPT | Mod: CPTII,S$GLB,, | Performed by: INTERNAL MEDICINE

## 2022-03-15 PROCEDURE — 1159F PR MEDICATION LIST DOCUMENTED IN MEDICAL RECORD: ICD-10-PCS | Mod: CPTII,S$GLB,, | Performed by: INTERNAL MEDICINE

## 2022-03-15 PROCEDURE — 3074F SYST BP LT 130 MM HG: CPT | Mod: CPTII,S$GLB,, | Performed by: INTERNAL MEDICINE

## 2022-03-15 PROCEDURE — 3288F PR FALLS RISK ASSESSMENT DOCUMENTED: ICD-10-PCS | Mod: CPTII,S$GLB,, | Performed by: INTERNAL MEDICINE

## 2022-03-15 PROCEDURE — 93296 REM INTERROG EVL PM/IDS: CPT | Mod: S$GLB,,, | Performed by: INTERNAL MEDICINE

## 2022-03-15 PROCEDURE — 99999 PR PBB SHADOW E&M-EST. PATIENT-LVL V: CPT | Mod: PBBFAC,,, | Performed by: INTERNAL MEDICINE

## 2022-03-15 PROCEDURE — 87086 URINE CULTURE/COLONY COUNT: CPT | Performed by: INTERNAL MEDICINE

## 2022-03-15 PROCEDURE — 3074F PR MOST RECENT SYSTOLIC BLOOD PRESSURE < 130 MM HG: ICD-10-PCS | Mod: CPTII,S$GLB,, | Performed by: INTERNAL MEDICINE

## 2022-03-15 PROCEDURE — 99499 RISK ADDL DX/OHS AUDIT: ICD-10-PCS | Mod: S$GLB,,, | Performed by: INTERNAL MEDICINE

## 2022-03-15 PROCEDURE — 99999 PR PBB SHADOW E&M-EST. PATIENT-LVL V: ICD-10-PCS | Mod: PBBFAC,,, | Performed by: INTERNAL MEDICINE

## 2022-03-15 PROCEDURE — 99214 OFFICE O/P EST MOD 30 MIN: CPT | Mod: S$GLB,,, | Performed by: INTERNAL MEDICINE

## 2022-03-15 PROCEDURE — 93296 PR INTERROG EVAL, REMOTE, UP TO 90 DAYS, PACER/DEFIB,TECH REVIEW: ICD-10-PCS | Mod: S$GLB,,, | Performed by: INTERNAL MEDICINE

## 2022-03-15 PROCEDURE — 1101F PR PT FALLS ASSESS DOC 0-1 FALLS W/OUT INJ PAST YR: ICD-10-PCS | Mod: CPTII,S$GLB,, | Performed by: INTERNAL MEDICINE

## 2022-03-15 PROCEDURE — 93295 PR INTERROG EVAL, REMOTE, UP TO 90 DAYS,CARDVERT/DEFIB: ICD-10-PCS | Mod: S$GLB,,, | Performed by: INTERNAL MEDICINE

## 2022-03-15 PROCEDURE — 1126F AMNT PAIN NOTED NONE PRSNT: CPT | Mod: CPTII,S$GLB,, | Performed by: INTERNAL MEDICINE

## 2022-03-15 PROCEDURE — 99214 PR OFFICE/OUTPT VISIT, EST, LEVL IV, 30-39 MIN: ICD-10-PCS | Mod: S$GLB,,, | Performed by: INTERNAL MEDICINE

## 2022-03-15 PROCEDURE — 4010F PR ACE/ARB THEARPY RXD/TAKEN: ICD-10-PCS | Mod: CPTII,S$GLB,, | Performed by: INTERNAL MEDICINE

## 2022-03-15 PROCEDURE — 3008F BODY MASS INDEX DOCD: CPT | Mod: CPTII,S$GLB,, | Performed by: INTERNAL MEDICINE

## 2022-03-15 PROCEDURE — 3078F PR MOST RECENT DIASTOLIC BLOOD PRESSURE < 80 MM HG: ICD-10-PCS | Mod: CPTII,S$GLB,, | Performed by: INTERNAL MEDICINE

## 2022-03-15 PROCEDURE — 1160F RVW MEDS BY RX/DR IN RCRD: CPT | Mod: CPTII,S$GLB,, | Performed by: INTERNAL MEDICINE

## 2022-03-15 PROCEDURE — 1111F DSCHRG MED/CURRENT MED MERGE: CPT | Mod: CPTII,S$GLB,, | Performed by: INTERNAL MEDICINE

## 2022-03-15 PROCEDURE — 93295 DEV INTERROG REMOTE 1/2/MLT: CPT | Mod: S$GLB,,, | Performed by: INTERNAL MEDICINE

## 2022-03-15 PROCEDURE — 1101F PT FALLS ASSESS-DOCD LE1/YR: CPT | Mod: CPTII,S$GLB,, | Performed by: INTERNAL MEDICINE

## 2022-03-15 PROCEDURE — 99499 UNLISTED E&M SERVICE: CPT | Mod: S$GLB,,, | Performed by: INTERNAL MEDICINE

## 2022-03-15 PROCEDURE — 3008F PR BODY MASS INDEX (BMI) DOCUMENTED: ICD-10-PCS | Mod: CPTII,S$GLB,, | Performed by: INTERNAL MEDICINE

## 2022-03-15 PROCEDURE — 3078F DIAST BP <80 MM HG: CPT | Mod: CPTII,S$GLB,, | Performed by: INTERNAL MEDICINE

## 2022-03-15 PROCEDURE — 3288F FALL RISK ASSESSMENT DOCD: CPT | Mod: CPTII,S$GLB,, | Performed by: INTERNAL MEDICINE

## 2022-03-15 PROCEDURE — 1159F MED LIST DOCD IN RCRD: CPT | Mod: CPTII,S$GLB,, | Performed by: INTERNAL MEDICINE

## 2022-03-15 PROCEDURE — 1111F PR DISCHARGE MEDS RECONCILED W/ CURRENT OUTPATIENT MED LIST: ICD-10-PCS | Mod: CPTII,S$GLB,, | Performed by: INTERNAL MEDICINE

## 2022-03-15 NOTE — PROGRESS NOTES
Subjective:       Patient ID: Odette Hart is a 66 y.o. female.    Chief Complaint: Hospital Follow Up, Abdominal Pain, and Pain (Bladder )    HPI Pt just recently d/annalise [facility] with h/o ischemic cardiomyopathy chronic CHF EF 20% s/p recent coronary stent placement and recurrent UTI nad passing air when urinate found to have bladdercolon fistula need surgical correction but not able get of anticoagulant just had new cardiac stent on low residue diet loosing wt no fever chill no abd pain no sob cp but positive fr LI . Pt is currently on omnicef  Review of Systems    Objective:      Physical Exam  Vitals and nursing note reviewed.   Constitutional:       General: She is not in acute distress.     Appearance: She is well-developed.   HENT:      Head: Normocephalic and atraumatic.      Right Ear: External ear normal.      Left Ear: External ear normal.      Nose: Nose normal.      Mouth/Throat:      Pharynx: No oropharyngeal exudate.   Eyes:      Extraocular Movements: Extraocular movements intact.      Conjunctiva/sclera: Conjunctivae normal.      Pupils: Pupils are equal, round, and reactive to light.   Neck:      Thyroid: No thyromegaly.   Cardiovascular:      Rate and Rhythm: Normal rate and regular rhythm.      Heart sounds: Normal heart sounds. No murmur heard.    No friction rub. No gallop.   Pulmonary:      Effort: Pulmonary effort is normal. No respiratory distress.      Breath sounds: Normal breath sounds. No wheezing or rales.   Abdominal:      General: Bowel sounds are normal. There is no distension.      Palpations: Abdomen is soft.      Tenderness: There is no abdominal tenderness. There is no guarding.   Musculoskeletal:         General: No tenderness or deformity. Normal range of motion.      Cervical back: Normal range of motion and neck supple.   Lymphadenopathy:      Cervical: No cervical adenopathy.   Skin:     General: Skin is warm and dry.      Findings: No erythema or rash.    Neurological:      Mental Status: She is alert and oriented to person, place, and time.   Psychiatric:         Thought Content: Thought content normal.         Judgment: Judgment normal.      Comments: Anxious worry         Assessment:       1. Colovesical fistula    2. Acute cystitis without hematuria    3. Weight loss    4. Ischemic cardiomyopathy    5. Paroxysmal supraventricular tachycardia    6. Hypertensive chronic kidney disease with stage 1 through stage 4 chronic kidney disease, or unspecified chronic kidney disease         Plan:       Colovesical fistula  Comments:  await surgery when can get off anticoagulant continue with low residue diet and monitor for infection  Orders:  -     CBC Auto Differential; Future; Expected date: 03/15/2022  -     Comprehensive Metabolic Panel; Future; Expected date: 03/15/2022  -     Procalcitonin; Future; Expected date: 03/15/2022  -     Sedimentation rate; Future; Expected date: 03/15/2022  -     Ambulatory referral/consult to Nutrition Services; Future; Expected date: 03/17/2022    Acute cystitis without hematuria  -     CULTURE, BLOOD; Future; Expected date: 03/15/2022  -     POCT urine dipstick without microscope  -     Urine culture    Weight loss  Comments:  try consult dietitian continue with supplement at home  Orders:  -     Ambulatory referral/consult to Nutrition Services; Future; Expected date: 03/17/2022    Ischemic cardiomyopathy  -     BNP; Future; Expected date: 03/15/2022    Paroxysmal supraventricular tachycardia  Comments:  currently normal sinuses rythm HR controlled    Hypertensive chronic kidney disease with stage 1 through stage 4 chronic kidney disease, or unspecified chronic kidney disease   -     Ambulatory referral/consult to Nutrition Services; Future; Expected date: 03/17/2022        Medication List with Changes/Refills   Current Medications    ACETAMINOPHEN (TYLENOL) 500 MG TABLET    Take 2 tablets (1,000 mg total) by mouth every 8 (eight)  hours as needed for Pain.    ALBUTEROL (PROVENTIL/VENTOLIN HFA) 90 MCG/ACTUATION INHALER    Inhale 2 puffs into the lungs every 6 (six) hours as needed for Wheezing. Rescue    ALPRAZOLAM (XANAX) 0.5 MG TABLET    Take 1 tablet (0.5 mg total) by mouth nightly as needed for Anxiety.    ASPIRIN (ECOTRIN) 81 MG EC TABLET    Take 1 tablet (81 mg total) by mouth once daily.    CARVEDILOL (COREG) 12.5 MG TABLET    Take 1 tablet (12.5 mg total) by mouth 2 (two) times daily with meals.    CEFDINIR (OMNICEF) 300 MG CAPSULE    Take 1 capsule (300 mg total) by mouth 2 (two) times daily.    EZETIMIBE (ZETIA) 10 MG TABLET    Take 1 tablet (10 mg total) by mouth every evening.    FLUTICASONE PROPIONATE (FLONASE) 50 MCG/ACTUATION NASAL SPRAY    1 spray (50 mcg total) by Each Nostril route once daily.    LACTOBACILLUS RHAMNOSUS GG (CULTURELLE) 10 BILLION CELL CAPSULE    Take 1 capsule by mouth 2 (two) times daily.    METRONIDAZOLE (FLAGYL) 500 MG TABLET    Take 1 tablet (500 mg total) by mouth every 8 (eight) hours.    NITROGLYCERIN (NITROSTAT) 0.4 MG SL TABLET    Place 1 tablet (0.4 mg total) under the tongue every 5 (five) minutes as needed for Chest pain.    OMEPRAZOLE (PRILOSEC) 40 MG CAPSULE    Take 1 capsule (40 mg total) by mouth once daily.    ONDANSETRON (ZOFRAN-ODT) 4 MG TBDL    Take 1 tablet (4 mg total) by mouth every 6 (six) hours as needed (nausea).    POLYETHYLENE GLYCOL (GLYCOLAX) 17 GRAM/DOSE POWDER    Mix one capful (17 grams) with liquid and drink by mouth once daily.    PRASUGREL (EFFIENT) 10 MG TAB    Take 1 tablet (10 mg total) by mouth once daily.    SIMETHICONE (MYLICON) 80 MG CHEWABLE TABLET    Take 1 tablet (80 mg total) by mouth 3 (three) times daily as needed for Flatulence.    SPIRONOLACTONE (ALDACTONE) 25 MG TABLET    Take 1 tablet (25 mg total) by mouth once daily.

## 2022-03-15 NOTE — PROGRESS NOTES
Subjective:      Patient ID: Odette Hart is a 66 y.o. female.    Chief Complaint: No chief complaint on file.    HPI:    ROS [employer] AICD remote interrogation report downloaded and prepared by medical assistant and interpreted by me and full report scanned into Epic media.  Battery OK with JOES 5 years. Both leads OK.   17% RA pacing.  No RV pacing.  Nonsustained VT noted (no ECG however). No therapy.  Normal device function.    Past Medical History:   Diagnosis Date    Acute coronary syndrome     Allergy     Arthritis     Cardiomyopathy     CHF (congestive heart failure)     Coronary artery disease     Coronary artery disease of native artery of native heart with stable angina pectoris 2021: [facility]: Cath: LAD: Proximal stent patent. LCX: Proximal 80%. LCX: Dominant. Moderate disease. LCX: HEVER 2.75 x 18 mm. Distal dissection. HEVER 2.75 x 22 mm.     Diverticulitis     Diverticulosis     Familial hypercholesterolemia 2022    Former smoker 2022    Heart attack     Heart disease     History of myocardial infarction 2022    Hyperlipidemia     Hypertension     Hypertension 2022    ICD (implantable cardioverter-defibrillator) in place     Non-ST elevation myocardial infarction (NSTEMI) 2019        Past Surgical History:   Procedure Laterality Date    ATRIAL CARDIAC PACEMAKER INSERTION       SECTION      CORONARY STENT PLACEMENT      LEFT HEART CATHETERIZATION Left 2022    Procedure: CATHETERIZATION, HEART, LEFT;  Surgeon: Yohannes Cordova MD;  Location: [facility] C[facility];  Service: Cardiology;  Laterality: Left;       Family History   Problem Relation Age of Onset    Heart disease Mother     Emphysema Father     Heart attack Brother        Social History     Socioeconomic History    Marital status:    Tobacco Use    Smoking status: Former Smoker     Packs/day: 0.50     Start date: 1976     Quit date: 2012     Years  since quittin.2    Smokeless tobacco: Never Used   Substance and Sexual Activity    Alcohol use: No    Drug use: No       Current Outpatient Medications on File Prior to Visit   Medication Sig Dispense Refill    acetaminophen (TYLENOL) 500 MG tablet Take 2 tablets (1,000 mg total) by mouth every 8 (eight) hours as needed for Pain. (Patient not taking: No sig reported)  0    albuterol (PROVENTIL/VENTOLIN HFA) 90 mcg/actuation inhaler Inhale 2 puffs into the lungs every 6 (six) hours as needed for Wheezing. Rescue 18 g 1    ALPRAZolam (XANAX) 0.5 MG tablet Take 1 tablet (0.5 mg total) by mouth nightly as needed for Anxiety. 30 tablet 2    aspirin (ECOTRIN) 81 MG EC tablet Take 1 tablet (81 mg total) by mouth once daily. 90 tablet 0    carvediloL (COREG) 12.5 MG tablet Take 1 tablet (12.5 mg total) by mouth 2 (two) times daily with meals. 180 tablet 3    cefdinir (OMNICEF) 300 MG capsule Take 1 capsule (300 mg total) by mouth 2 (two) times daily. 60 capsule 0    ezetimibe (ZETIA) 10 mg tablet Take 1 tablet (10 mg total) by mouth every evening. 90 tablet 3    fluticasone propionate (FLONASE) 50 mcg/actuation nasal spray 1 spray (50 mcg total) by Each Nostril route once daily. 9.9 mL 1    Lactobacillus rhamnosus GG (CULTURELLE) 10 billion cell capsule Take 1 capsule by mouth 2 (two) times daily. (Patient not taking: No sig reported) 60 capsule 0    metroNIDAZOLE (FLAGYL) 500 MG tablet Take 1 tablet (500 mg total) by mouth every 8 (eight) hours. 90 tablet 0    nitroGLYCERIN (NITROSTAT) 0.4 MG SL tablet Place 1 tablet (0.4 mg total) under the tongue every 5 (five) minutes as needed for Chest pain. 25 tablet 0    omeprazole (PRILOSEC) 40 MG capsule Take 1 capsule (40 mg total) by mouth once daily. 90 capsule 2    ondansetron (ZOFRAN-ODT) 4 MG TbDL Take 1 tablet (4 mg total) by mouth every 6 (six) hours as needed (nausea). 20 tablet 1    polyethylene glycol (GLYCOLAX) 17 gram/dose powder Mix one capful  (17 grams) with liquid and drink by mouth once daily. 510 g 0    prasugreL (EFFIENT) 10 mg Tab Take 1 tablet (10 mg total) by mouth once daily. 90 tablet 0    simethicone (MYLICON) 80 MG chewable tablet Take 1 tablet (80 mg total) by mouth 3 (three) times daily as needed for Flatulence. 30 tablet 2    spironolactone (ALDACTONE) 25 MG tablet Take 1 tablet (25 mg total) by mouth once daily. 90 tablet 3     No current facility-administered medications on file prior to visit.       Review of patient's allergies indicates:   Allergen Reactions    Augmentin [amoxicillin-pot clavulanate] Swelling     Not allergic to amoxicillin just a derivative of augmentin    Lisinopril Other (See Comments)     Fluid around heart    Losartan Other (See Comments)     High potassium    Shellfish containing products Anaphylaxis     Pt.states she is allergic to SEAFOOD since the age of 13    Levaquin [levofloxacin] Other (See Comments)     Very bad joint pain    Clindamycin Palpitations     Chest pain     Objective:   There were no vitals filed for this visit.     Physical Exam     Assessment:     1. Automatic implantable cardioverter-defibrillator in situ    2. RBBB with left anterior fascicular block      Plan:   Diagnoses and all orders for this visit:    Automatic implantable cardioverter-defibrillator in situ    RBBB with left anterior fascicular block         No follow-ups on file.

## 2022-03-16 ENCOUNTER — PATIENT MESSAGE (OUTPATIENT)
Dept: PRIMARY CARE CLINIC | Facility: CLINIC | Age: 67
End: 2022-03-16
Payer: MEDICARE

## 2022-03-17 LAB
BACTERIA UR CULT: NORMAL
BACTERIA UR CULT: NORMAL

## 2022-03-21 ENCOUNTER — PATIENT MESSAGE (OUTPATIENT)
Dept: CARDIOLOGY | Facility: CLINIC | Age: 67
End: 2022-03-21
Payer: MEDICARE

## 2022-03-21 NOTE — TELEPHONE ENCOUNTER
Patient needs to be scheduled for surgery for colovesical fistula. Rescheduled patient for a sooner appointment with Dr Arenas for recommendations for stopping blood thinners and clearance for surgery.  Confirmed appointment with patient.

## 2022-03-22 ENCOUNTER — PATIENT OUTREACH (OUTPATIENT)
Dept: ADMINISTRATIVE | Facility: OTHER | Age: 67
End: 2022-03-22
Payer: MEDICARE

## 2022-04-04 ENCOUNTER — OFFICE VISIT (OUTPATIENT)
Dept: CARDIOLOGY | Facility: CLINIC | Age: 67
End: 2022-04-04
Payer: MEDICARE

## 2022-04-04 VITALS
SYSTOLIC BLOOD PRESSURE: 117 MMHG | BODY MASS INDEX: 21.38 KG/M2 | OXYGEN SATURATION: 98 % | HEIGHT: 63 IN | WEIGHT: 120.69 LBS | DIASTOLIC BLOOD PRESSURE: 63 MMHG | HEART RATE: 92 BPM

## 2022-04-04 DIAGNOSIS — I25.118 CORONARY ARTERY DISEASE OF NATIVE ARTERY OF NATIVE HEART WITH STABLE ANGINA PECTORIS: ICD-10-CM

## 2022-04-04 DIAGNOSIS — I10 PRIMARY HYPERTENSION: ICD-10-CM

## 2022-04-04 DIAGNOSIS — Z95.810 AUTOMATIC IMPLANTABLE CARDIOVERTER-DEFIBRILLATOR IN SITU: Primary | ICD-10-CM

## 2022-04-04 DIAGNOSIS — E78.01 FAMILIAL HYPERCHOLESTEROLEMIA: ICD-10-CM

## 2022-04-04 DIAGNOSIS — I45.2 RBBB WITH LEFT ANTERIOR FASCICULAR BLOCK: ICD-10-CM

## 2022-04-04 DIAGNOSIS — I50.42 CHRONIC COMBINED SYSTOLIC AND DIASTOLIC CONGESTIVE HEART FAILURE: ICD-10-CM

## 2022-04-04 DIAGNOSIS — N32.1 COLOVESICAL FISTULA: ICD-10-CM

## 2022-04-04 DIAGNOSIS — I25.5 ISCHEMIC CARDIOMYOPATHY: ICD-10-CM

## 2022-04-04 DIAGNOSIS — I25.2 HISTORY OF MYOCARDIAL INFARCTION: ICD-10-CM

## 2022-04-04 DIAGNOSIS — Z95.5 HISTORY OF CORONARY ARTERY STENT PLACEMENT: ICD-10-CM

## 2022-04-04 PROCEDURE — 99214 PR OFFICE/OUTPT VISIT, EST, LEVL IV, 30-39 MIN: ICD-10-PCS | Mod: 25,S$GLB,, | Performed by: INTERNAL MEDICINE

## 2022-04-04 PROCEDURE — 1101F PR PT FALLS ASSESS DOC 0-1 FALLS W/OUT INJ PAST YR: ICD-10-PCS | Mod: CPTII,S$GLB,, | Performed by: INTERNAL MEDICINE

## 2022-04-04 PROCEDURE — 1160F PR REVIEW ALL MEDS BY PRESCRIBER/CLIN PHARMACIST DOCUMENTED: ICD-10-PCS | Mod: CPTII,S$GLB,, | Performed by: INTERNAL MEDICINE

## 2022-04-04 PROCEDURE — 1125F PR PAIN SEVERITY QUANTIFIED, PAIN PRESENT: ICD-10-PCS | Mod: CPTII,S$GLB,, | Performed by: INTERNAL MEDICINE

## 2022-04-04 PROCEDURE — 3078F DIAST BP <80 MM HG: CPT | Mod: CPTII,S$GLB,, | Performed by: INTERNAL MEDICINE

## 2022-04-04 PROCEDURE — 1101F PT FALLS ASSESS-DOCD LE1/YR: CPT | Mod: CPTII,S$GLB,, | Performed by: INTERNAL MEDICINE

## 2022-04-04 PROCEDURE — 3078F PR MOST RECENT DIASTOLIC BLOOD PRESSURE < 80 MM HG: ICD-10-PCS | Mod: CPTII,S$GLB,, | Performed by: INTERNAL MEDICINE

## 2022-04-04 PROCEDURE — 1160F RVW MEDS BY RX/DR IN RCRD: CPT | Mod: CPTII,S$GLB,, | Performed by: INTERNAL MEDICINE

## 2022-04-04 PROCEDURE — 1159F PR MEDICATION LIST DOCUMENTED IN MEDICAL RECORD: ICD-10-PCS | Mod: CPTII,S$GLB,, | Performed by: INTERNAL MEDICINE

## 2022-04-04 PROCEDURE — 3008F PR BODY MASS INDEX (BMI) DOCUMENTED: ICD-10-PCS | Mod: CPTII,S$GLB,, | Performed by: INTERNAL MEDICINE

## 2022-04-04 PROCEDURE — 3008F BODY MASS INDEX DOCD: CPT | Mod: CPTII,S$GLB,, | Performed by: INTERNAL MEDICINE

## 2022-04-04 PROCEDURE — 99499 UNLISTED E&M SERVICE: CPT | Mod: S$GLB,,, | Performed by: INTERNAL MEDICINE

## 2022-04-04 PROCEDURE — 99214 OFFICE O/P EST MOD 30 MIN: CPT | Mod: 25,S$GLB,, | Performed by: INTERNAL MEDICINE

## 2022-04-04 PROCEDURE — 1159F MED LIST DOCD IN RCRD: CPT | Mod: CPTII,S$GLB,, | Performed by: INTERNAL MEDICINE

## 2022-04-04 PROCEDURE — 1125F AMNT PAIN NOTED PAIN PRSNT: CPT | Mod: CPTII,S$GLB,, | Performed by: INTERNAL MEDICINE

## 2022-04-04 PROCEDURE — 3288F FALL RISK ASSESSMENT DOCD: CPT | Mod: CPTII,S$GLB,, | Performed by: INTERNAL MEDICINE

## 2022-04-04 PROCEDURE — 3074F SYST BP LT 130 MM HG: CPT | Mod: CPTII,S$GLB,, | Performed by: INTERNAL MEDICINE

## 2022-04-04 PROCEDURE — 3288F PR FALLS RISK ASSESSMENT DOCUMENTED: ICD-10-PCS | Mod: CPTII,S$GLB,, | Performed by: INTERNAL MEDICINE

## 2022-04-04 PROCEDURE — 4010F PR ACE/ARB THEARPY RXD/TAKEN: ICD-10-PCS | Mod: CPTII,S$GLB,, | Performed by: INTERNAL MEDICINE

## 2022-04-04 PROCEDURE — 93000 EKG 12-LEAD: ICD-10-PCS | Mod: 59,S$GLB,, | Performed by: INTERNAL MEDICINE

## 2022-04-04 PROCEDURE — 93283 PR PROGRAM EVAL (IN PERSON) IMPLANT DEVICE,CARDVERT/DEFIB,2 LEAD: ICD-10-PCS | Mod: 26,,, | Performed by: INTERNAL MEDICINE

## 2022-04-04 PROCEDURE — 99499 RISK ADDL DX/OHS AUDIT: ICD-10-PCS | Mod: S$GLB,,, | Performed by: INTERNAL MEDICINE

## 2022-04-04 PROCEDURE — 93283 PRGRMG EVAL IMPLANTABLE DFB: CPT | Mod: 26,,, | Performed by: INTERNAL MEDICINE

## 2022-04-04 PROCEDURE — 4010F ACE/ARB THERAPY RXD/TAKEN: CPT | Mod: CPTII,S$GLB,, | Performed by: INTERNAL MEDICINE

## 2022-04-04 PROCEDURE — 3074F PR MOST RECENT SYSTOLIC BLOOD PRESSURE < 130 MM HG: ICD-10-PCS | Mod: CPTII,S$GLB,, | Performed by: INTERNAL MEDICINE

## 2022-04-04 PROCEDURE — 99999 PR PBB SHADOW E&M-EST. PATIENT-LVL III: ICD-10-PCS | Mod: PBBFAC,,, | Performed by: INTERNAL MEDICINE

## 2022-04-04 PROCEDURE — 99999 PR PBB SHADOW E&M-EST. PATIENT-LVL III: CPT | Mod: PBBFAC,,, | Performed by: INTERNAL MEDICINE

## 2022-04-04 PROCEDURE — 93000 ELECTROCARDIOGRAM COMPLETE: CPT | Mod: 59,S$GLB,, | Performed by: INTERNAL MEDICINE

## 2022-04-04 RX ORDER — CEFDINIR 300 MG/1
300 CAPSULE ORAL
COMMUNITY
Start: 2022-03-25 | End: 2022-04-18

## 2022-04-04 RX ORDER — METRONIDAZOLE 500 MG/1
500 TABLET ORAL
COMMUNITY
Start: 2022-03-25 | End: 2022-04-18

## 2022-04-04 RX ORDER — SIMETHICONE 80 MG
80 TABLET,CHEWABLE ORAL EVERY 8 HOURS PRN
COMMUNITY
End: 2022-01-01 | Stop reason: SDUPTHER

## 2022-04-04 NOTE — PROGRESS NOTES
"  Subjective:      Patient ID: Odette Hart is a 66 y.o. female.    Chief Complaint: Follow-up (C/o racing heart rate)    HPI:   Hospitalized at [facility] with LLQ pain and pt was dx with diverticulitis and colovesicular fistula.  Pt was told she need surgery but this was delayed due to recent stents and need for DAPT.    Pt was advised to have a colonoscopy but requested deferral of exam due to pain.    Pt saw Dr Pereira.    "I'm peeing feces."    Pt took antibiotics for a month.    Pt went to [facility] with persistent LLQ pain and chest pain.      Pt was instructed to stop daily products and pt was told to eat food.    Review of Systems   Cardiovascular: Positive for chest pain (Pt has mild transient soreness in central chest unrelated to exertion.  The discomfort is worse with deep inspiration.. (It feels like when my esophagus was inflamed)), dyspnea on exertion (Chronic, especially when picking something up or when walking fast.) and palpitations (Pt feels heartbeat after taking Effietn). Negative for claudication, irregular heartbeat, leg swelling, near-syncope, orthopnea and syncope.      Pt also has a vaginal discharge        Past Medical History:   Diagnosis Date    Acute coronary syndrome     Allergy     Arthritis     Cardiomyopathy     CHF (congestive heart failure)     Coronary artery disease     Coronary artery disease of native artery of native heart with stable angina pectoris 4/19/2021 1/24/2022: [facility]: Cath: LAD: Proximal stent patent. LCX: Proximal 80%. LCX: Dominant. Moderate disease. LCX: HEVER 2.75 x 18 mm. Distal dissection. HEVER 2.75 x 22 mm.     Diverticulitis     Diverticulosis     Familial hypercholesterolemia 1/22/2022    Former smoker 1/24/2022    Heart attack     Heart disease     History of myocardial infarction 1/24/2022    Hyperlipidemia     Hypertension     Hypertension 1/24/2022    ICD (implantable cardioverter-defibrillator) in place     Non-ST " elevation myocardial infarction (NSTEMI) 2019        Past Surgical History:   Procedure Laterality Date    ATRIAL CARDIAC PACEMAKER INSERTION       SECTION      CORONARY STENT PLACEMENT      LEFT HEART CATHETERIZATION Left 2022    Procedure: CATHETERIZATION, HEART, LEFT;  Surgeon: Yohannes Cordova MD;  Location: [facility] CATH LAB;  Service: Cardiology;  Laterality: Left;       Family History   Problem Relation Age of Onset    Heart disease Mother     Emphysema Father     Heart attack Brother        Social History     Socioeconomic History    Marital status:    Tobacco Use    Smoking status: Former Smoker     Packs/day: 0.50     Start date: 1976     Quit date: 2012     Years since quittin.3    Smokeless tobacco: Never Used   Substance and Sexual Activity    Alcohol use: No    Drug use: No       Current Outpatient Medications on File Prior to Visit   Medication Sig Dispense Refill    acetaminophen (TYLENOL) 500 MG tablet Take 2 tablets (1,000 mg total) by mouth every 8 (eight) hours as needed for Pain.  0    albuterol (PROVENTIL/VENTOLIN HFA) 90 mcg/actuation inhaler Inhale 2 puffs into the lungs every 6 (six) hours as needed for Wheezing. Rescue 18 g 1    ALPRAZolam (XANAX) 0.5 MG tablet Take 1 tablet (0.5 mg total) by mouth nightly as needed for Anxiety. 30 tablet 2    aspirin (ECOTRIN) 81 MG EC tablet Take 1 tablet (81 mg total) by mouth once daily. 90 tablet 0    carvediloL (COREG) 12.5 MG tablet Take 1 tablet (12.5 mg total) by mouth 2 (two) times daily with meals. 180 tablet 3    cefdinir (OMNICEF) 300 MG capsule Take 300 mg by mouth.      ezetimibe (ZETIA) 10 mg tablet Take 1 tablet (10 mg total) by mouth every evening. 90 tablet 3    fluticasone propionate (FLONASE) 50 mcg/actuation nasal spray 1 spray (50 mcg total) by Each Nostril route once daily. 9.9 mL 1    Lactobacillus rhamnosus GG (CULTURELLE) 10 billion cell capsule Take 1 capsule by mouth once  "daily.      metroNIDAZOLE (FLAGYL) 500 MG tablet Take 500 mg by mouth.      nitroGLYCERIN (NITROSTAT) 0.4 MG SL tablet Place 1 tablet (0.4 mg total) under the tongue every 5 (five) minutes as needed for Chest pain. 25 tablet 0    omeprazole (PRILOSEC) 40 MG capsule Take 1 capsule (40 mg total) by mouth once daily. 90 capsule 2    [] ondansetron (ZOFRAN-ODT) 4 MG TbDL Take 1 tablet (4 mg total) by mouth every 6 (six) hours as needed (nausea). 20 tablet 1    prasugreL (EFFIENT) 10 mg Tab Take 1 tablet (10 mg total) by mouth once daily. 90 tablet 0    spironolactone (ALDACTONE) 25 MG tablet Take 1 tablet (25 mg total) by mouth once daily. 90 tablet 3    simethicone (MYLICON) 80 MG chewable tablet Take 80 mg by mouth every 8 (eight) hours as needed.       Current Facility-Administered Medications on File Prior to Visit   Medication Dose Route Frequency Provider Last Rate Last Admin    [DISCONTINUED] ondansetron disintegrating tablet  4 mg Oral Q6H PRN Generic External Data Provider           Review of patient's allergies indicates:   Allergen Reactions    Augmentin [amoxicillin-pot clavulanate] Swelling     Not allergic to amoxicillin just a derivative of augmentin    Lisinopril Other (See Comments)     Fluid around heart    Losartan Other (See Comments)     High potassium    Shellfish containing products Anaphylaxis     Pt.states she is allergic to SEAFOOD since the age of 13    Levaquin [levofloxacin] Other (See Comments)     Very bad joint pain    Clindamycin Palpitations     Chest pain     Objective:     Vitals:    22 1436   BP: 117/63   BP Location: Right arm   Patient Position: Sitting   BP Method: Large (Automatic)   Pulse: 92   SpO2: 98%   Weight: 54.8 kg (120 lb 11.2 oz)   Height: 5' 3" (1.6 m)        Physical Exam  Vitals reviewed.   Constitutional:       Appearance: She is well-developed.   HENT:      Mouth/Throat:      Pharynx: Oropharyngeal exudate (white coating on tongue) " present.   Eyes:      General: No scleral icterus.  Neck:      Vascular: No carotid bruit or JVD.   Cardiovascular:      Rate and Rhythm: Normal rate and regular rhythm.      Heart sounds: No murmur heard.    No gallop.   Pulmonary:      Breath sounds: Normal breath sounds.   Skin:     General: Skin is warm and dry.   Neurological:      Mental Status: She is alert and oriented to person, place, and time.   Psychiatric:         Behavior: Behavior normal.         Thought Content: Thought content normal.         Judgment: Judgment normal.        ECG in office today reviewed by me:  NSR, RBBB, LAHB, anterolateral infarct, unchanged      AICD interrogated in office with company rep:  Normal function  5 beat VT noted      Lab Visit on 03/15/2022   Component Date Value Ref Range Status    HIV 1/2 Ag/Ab 03/15/2022 Negative  Negative Final    CPK 03/15/2022 43  20 - 180 U/L Final    Magnesium 03/15/2022 2.0  1.6 - 2.6 mg/dL Final    BNP 03/15/2022 425 (A) 0 - 99 pg/mL Final    Cholesterol 03/15/2022 150  120 - 199 mg/dL Final    Triglycerides 03/15/2022 137  30 - 150 mg/dL Final    HDL 03/15/2022 29 (A) 40 - 75 mg/dL Final    LDL Cholesterol 03/15/2022 93.6  63.0 - 159.0 mg/dL Final    HDL/Cholesterol Ratio 03/15/2022 19.3 (A) 20.0 - 50.0 % Final    Total Cholesterol/HDL Ratio 03/15/2022 5.2 (A) 2.0 - 5.0 Final    Non-HDL Cholesterol 03/15/2022 121  mg/dL Final    TSH 03/15/2022 1.553  0.400 - 4.000 uIU/mL Final    WBC 03/15/2022 4.64  3.90 - 12.70 K/uL Final    RBC 03/15/2022 4.76  4.00 - 5.40 M/uL Final    Hemoglobin 03/15/2022 12.7  12.0 - 16.0 g/dL Final    Hematocrit 03/15/2022 40.8  37.0 - 48.5 % Final    MCV 03/15/2022 86  82 - 98 fL Final    MCH 03/15/2022 26.7 (A) 27.0 - 31.0 pg Final    MCHC 03/15/2022 31.1 (A) 32.0 - 36.0 g/dL Final    RDW 03/15/2022 21.2 (A) 11.5 - 14.5 % Final    Platelets 03/15/2022 327  150 - 450 K/uL Final    MPV 03/15/2022 10.0  9.2 - 12.9 fL Final    Immature  Granulocytes 03/15/2022 0.2  0.0 - 0.5 % Final    Gran # (ANC) 03/15/2022 1.1 (A) 1.8 - 7.7 K/uL Final    Immature Grans (Abs) 03/15/2022 0.01  0.00 - 0.04 K/uL Final    Lymph # 03/15/2022 2.9  1.0 - 4.8 K/uL Final    Mono # 03/15/2022 0.6  0.3 - 1.0 K/uL Final    Eos # 03/15/2022 0.0  0.0 - 0.5 K/uL Final    Baso # 03/15/2022 0.05  0.00 - 0.20 K/uL Final    nRBC 03/15/2022 0  0 /100 WBC Final    Gran % 03/15/2022 23.5 (A) 38.0 - 73.0 % Final    Lymph % 03/15/2022 62.1 (A) 18.0 - 48.0 % Final    Mono % 03/15/2022 12.9  4.0 - 15.0 % Final    Eosinophil % 03/15/2022 0.2  0.0 - 8.0 % Final    Basophil % 03/15/2022 1.1  0.0 - 1.9 % Final    Differential Method 03/15/2022 Automated   Final    Sodium 03/15/2022 136  136 - 145 mmol/L Final    Potassium 03/15/2022 4.8  3.5 - 5.1 mmol/L Final    Chloride 03/15/2022 99  95 - 110 mmol/L Final    CO2 03/15/2022 27  23 - 29 mmol/L Final    Glucose 03/15/2022 116 (A) 70 - 110 mg/dL Final    BUN 03/15/2022 9  8 - 23 mg/dL Final    Creatinine 03/15/2022 0.8  0.5 - 1.4 mg/dL Final    Calcium 03/15/2022 9.4  8.7 - 10.5 mg/dL Final    Total Protein 03/15/2022 8.7 (A) 6.0 - 8.4 g/dL Final    Albumin 03/15/2022 3.0 (A) 3.5 - 5.2 g/dL Final    Total Bilirubin 03/15/2022 0.6  0.1 - 1.0 mg/dL Final    Alkaline Phosphatase 03/15/2022 77  55 - 135 U/L Final    AST 03/15/2022 54 (A) 10 - 40 U/L Final    ALT 03/15/2022 32  10 - 44 U/L Final    Anion Gap 03/15/2022 10  8 - 16 mmol/L Final    eGFR if African American 03/15/2022 >60.0  >60 mL/min/1.73 m^2 Final    eGFR if non African American 03/15/2022 >60.0  >60 mL/min/1.73 m^2 Final    Procalcitonin 03/15/2022 0.05  <0.25 ng/mL Final    BNP 03/15/2022 425 (A) 0 - 99 pg/mL Final    Sed Rate 03/15/2022 104 (A) 0 - 20 mm/Hr Final    Blood Culture, Routine 03/15/2022 No growth after 5 days.   Final   Office Visit on 03/15/2022   Component Date Value Ref Range Status    Urine Culture, Routine 03/15/2022 Multiple  organisms isolated. None in predominance.  Repeat if   Final    Urine Culture, Routine 03/15/2022 clinically necessary.   Final   No results displayed because visit has over 200 results.      Admission on 02/01/2022, Discharged on 02/01/2022   Component Date Value Ref Range Status    WBC 02/01/2022 4.52  3.90 - 12.70 K/uL Final    RBC 02/01/2022 4.82  4.00 - 5.40 M/uL Final    Hemoglobin 02/01/2022 12.7  12.0 - 16.0 g/dL Final    Hematocrit 02/01/2022 39.8  37.0 - 48.5 % Final    MCV 02/01/2022 83  82 - 98 fL Final    MCH 02/01/2022 26.3 (A) 27.0 - 31.0 pg Final    MCHC 02/01/2022 31.9 (A) 32.0 - 36.0 g/dL Final    RDW 02/01/2022 19.6 (A) 11.5 - 14.5 % Final    Platelets 02/01/2022 310  150 - 450 K/uL Final    MPV 02/01/2022 9.7  9.2 - 12.9 fL Final    Immature Granulocytes 02/01/2022 CANCELED  0.0 - 0.5 % Final-Edited    Immature Grans (Abs) 02/01/2022 CANCELED  0.00 - 0.04 K/uL Final-Edited    nRBC 02/01/2022 0  0 /100 WBC Final    Gran % 02/01/2022 19.0 (A) 38.0 - 73.0 % Final    Lymph % 02/01/2022 74.0 (A) 18.0 - 48.0 % Final    Mono % 02/01/2022 7.0  4.0 - 15.0 % Final    Eosinophil % 02/01/2022 0.0  0.0 - 8.0 % Final    Basophil % 02/01/2022 0.0  0.0 - 1.9 % Final    Platelet Estimate 02/01/2022 Appears normal   Final    Aniso 02/01/2022 Slight   Final    Hypo 02/01/2022 Occasional   Final    Ovalocytes 02/01/2022 Occasional   Final    Differential Method 02/01/2022 Manual   Corrected    Sodium 02/01/2022 138  136 - 145 mmol/L Final    Potassium 02/01/2022 4.3  3.5 - 5.1 mmol/L Final    Chloride 02/01/2022 101  95 - 110 mmol/L Final    CO2 02/01/2022 22 (A) 23 - 29 mmol/L Final    Glucose 02/01/2022 124 (A) 70 - 110 mg/dL Final    BUN 02/01/2022 14  8 - 23 mg/dL Final    Creatinine 02/01/2022 0.8  0.5 - 1.4 mg/dL Final    Calcium 02/01/2022 9.2  8.7 - 10.5 mg/dL Final    Total Protein 02/01/2022 7.6  6.0 - 8.4 g/dL Final    Albumin 02/01/2022 3.1 (A) 3.5 - 5.2 g/dL Final     Total Bilirubin 02/01/2022 0.8  0.1 - 1.0 mg/dL Final    Alkaline Phosphatase 02/01/2022 109  55 - 135 U/L Final    AST 02/01/2022 33  10 - 40 U/L Final    ALT 02/01/2022 32  10 - 44 U/L Final    Anion Gap 02/01/2022 15  8 - 16 mmol/L Final    eGFR if African American 02/01/2022 >60.0  >60 mL/min/1.73 m^2 Final    eGFR if non African American 02/01/2022 >60.0  >60 mL/min/1.73 m^2 Final    POC Rapid COVID 02/01/2022 Negative  Negative Final     Acceptable 02/01/2022 Yes   Final    Troponin I 02/01/2022 0.044 (A) 0.000 - 0.026 ng/mL Final    BNP 02/01/2022 377 (A) 0 - 99 pg/mL Final    Troponin I 02/01/2022 0.043 (A) 0.000 - 0.026 ng/mL Final   No results displayed because visit has over 200 results.      Admission on 01/21/2022, Discharged on 01/22/2022   Component Date Value Ref Range Status    WBC 01/21/2022 2.26 (A) 3.90 - 12.70 K/uL Final    RBC 01/21/2022 4.68  4.00 - 5.40 M/uL Final    Hemoglobin 01/21/2022 12.5  12.0 - 16.0 g/dL Final    Hematocrit 01/21/2022 39.4  37.0 - 48.5 % Final    MCV 01/21/2022 84  82 - 98 fL Final    MCH 01/21/2022 26.7 (A) 27.0 - 31.0 pg Final    MCHC 01/21/2022 31.7 (A) 32.0 - 36.0 g/dL Final    RDW 01/21/2022 20.3 (A) 11.5 - 14.5 % Final    Platelets 01/21/2022 360  150 - 450 K/uL Final    MPV 01/21/2022 9.8  9.2 - 12.9 fL Final    Immature Granulocytes 01/21/2022 Test Not Performed  0.0 - 0.5 % Corrected    Immature Grans (Abs) 01/21/2022 Test Not Performed  0.00 - 0.04 K/uL Corrected    nRBC 01/21/2022 0  0 /100 WBC Final    Gran % 01/21/2022 1.0 (A) 38.0 - 73.0 % Corrected    Lymph % 01/21/2022 76.0 (A) 18.0 - 48.0 % Corrected    Mono % 01/21/2022 23.0 (A) 4.0 - 15.0 % Corrected    Eosinophil % 01/21/2022 0.0  0.0 - 8.0 % Final    Basophil % 01/21/2022 0.0  0.0 - 1.9 % Corrected    Platelet Estimate 01/21/2022 Appears normal   Final    Aniso 01/21/2022 Moderate   Final    Acanthocytes 01/21/2022 Present   Final    Large/Giant  Platelets 01/21/2022 Present   Final    Differential Method 01/21/2022 Manual   Corrected    Sodium 01/21/2022 138  136 - 145 mmol/L Final    Potassium 01/21/2022 4.7  3.5 - 5.1 mmol/L Final    Chloride 01/21/2022 103  95 - 110 mmol/L Final    CO2 01/21/2022 22 (A) 23 - 29 mmol/L Final    Glucose 01/21/2022 128 (A) 70 - 110 mg/dL Final    BUN 01/21/2022 17  8 - 23 mg/dL Final    Creatinine 01/21/2022 0.8  0.5 - 1.4 mg/dL Final    Calcium 01/21/2022 9.0  8.7 - 10.5 mg/dL Final    Total Protein 01/21/2022 7.6  6.0 - 8.4 g/dL Final    Albumin 01/21/2022 2.8 (A) 3.5 - 5.2 g/dL Final    Total Bilirubin 01/21/2022 0.6  0.1 - 1.0 mg/dL Final    Alkaline Phosphatase 01/21/2022 95  55 - 135 U/L Final    AST 01/21/2022 49 (A) 10 - 40 U/L Final    ALT 01/21/2022 33  10 - 44 U/L Final    Anion Gap 01/21/2022 13  8 - 16 mmol/L Final    eGFR if African American 01/21/2022 >60.0  >60 mL/min/1.73 m^2 Final    eGFR if non African American 01/21/2022 >60.0  >60 mL/min/1.73 m^2 Final    Troponin I 01/21/2022 3.325 (A) 0.000 - 0.026 ng/mL Final    BNP 01/21/2022 1,692 (A) 0 - 99 pg/mL Final    POC Rapid COVID 01/21/2022 Negative  Negative Final     Acceptable 01/21/2022 Yes   Final    Troponin I 01/21/2022 8.992 (A) 0.000 - 0.026 ng/mL Final    Troponin I 01/21/2022 8.581 (A) 0.000 - 0.026 ng/mL Final    Troponin I 01/22/2022 6.745 (A) 0.000 - 0.026 ng/mL Final    aPTT 01/22/2022 29.0  21.0 - 32.0 sec Final    Prothrombin Time 01/22/2022 11.3  9.0 - 12.5 sec Final    INR 01/22/2022 1.0  0.8 - 1.2 Final    WBC 01/22/2022 3.82 (A) 3.90 - 12.70 K/uL Final    RBC 01/22/2022 4.33  4.00 - 5.40 M/uL Final    Hemoglobin 01/22/2022 11.6 (A) 12.0 - 16.0 g/dL Final    Hematocrit 01/22/2022 36.7 (A) 37.0 - 48.5 % Final    MCV 01/22/2022 85  82 - 98 fL Final    MCH 01/22/2022 26.8 (A) 27.0 - 31.0 pg Final    MCHC 01/22/2022 31.6 (A) 32.0 - 36.0 g/dL Final    RDW 01/22/2022 20.2 (A) 11.5 - 14.5 %  Final    Platelets 01/22/2022 298  150 - 450 K/uL Final    MPV 01/22/2022 9.8  9.2 - 12.9 fL Final    Immature Granulocytes 01/22/2022 CANCELED  0.0 - 0.5 % Final    Immature Grans (Abs) 01/22/2022 CANCELED  0.00 - 0.04 K/uL Final    nRBC 01/22/2022 0  0 /100 WBC Final    Gran % 01/22/2022 0.0 (A) 38.0 - 73.0 % Final    Lymph % 01/22/2022 82.0 (A) 18.0 - 48.0 % Final    Mono % 01/22/2022 18.0 (A) 4.0 - 15.0 % Final    Eosinophil % 01/22/2022 0.0  0.0 - 8.0 % Final    Basophil % 01/22/2022 0.0  0.0 - 1.9 % Final    Platelet Estimate 01/22/2022 Appears normal   Final    Aniso 01/22/2022 Moderate   Final    Spherocytes 01/22/2022 Occasional   Final    Differential Method 01/22/2022 Manual   Corrected    Sodium 01/22/2022 134 (A) 136 - 145 mmol/L Final    Potassium 01/22/2022 4.6  3.5 - 5.1 mmol/L Final    Chloride 01/22/2022 99  95 - 110 mmol/L Final    CO2 01/22/2022 22 (A) 23 - 29 mmol/L Final    Glucose 01/22/2022 103  70 - 110 mg/dL Final    BUN 01/22/2022 17  8 - 23 mg/dL Final    Creatinine 01/22/2022 0.7  0.5 - 1.4 mg/dL Final    Calcium 01/22/2022 9.1  8.7 - 10.5 mg/dL Final    Total Protein 01/22/2022 7.3  6.0 - 8.4 g/dL Final    Albumin 01/22/2022 2.7 (A) 3.5 - 5.2 g/dL Final    Total Bilirubin 01/22/2022 0.9  0.1 - 1.0 mg/dL Final    Alkaline Phosphatase 01/22/2022 94  55 - 135 U/L Final    AST 01/22/2022 56 (A) 10 - 40 U/L Final    ALT 01/22/2022 30  10 - 44 U/L Final    Anion Gap 01/22/2022 13  8 - 16 mmol/L Final    eGFR if African American 01/22/2022 >60.0  >60 mL/min/1.73 m^2 Final    eGFR if non African American 01/22/2022 >60.0  >60 mL/min/1.73 m^2 Final    aPTT 01/22/2022 32.7 (A) 21.0 - 32.0 sec Final   Appointment on 12/16/2021   Component Date Value Ref Range Status    Color, UA 12/16/2021 Yellow   Final    pH, UA 12/16/2021 Trace   Final    WBC, UA 12/16/2021 Neg   Final    Nitrite, UA 12/16/2021 trace   Final    Protein, POC 12/16/2021 normal   Final     Glucose, UA 12/16/2021 neg   Final    Ketones, UA 12/16/2021 neg   Final    Urobilinogen, UA 12/16/2021 normal   Final    Bilirubin, POC 12/16/2021 neg   Final    Blood, UA 12/16/2021 about 50   Final    Clarity, UA 12/16/2021 Cloudy   Final    Spec Grav UA 12/16/2021 1.020   Final   Office Visit on 11/01/2021   Component Date Value Ref Range Status    Color, UA 11/01/2021 Yellow   Final    pH, UA 11/01/2021 5   Final    WBC, UA 11/01/2021 ++   Final    Nitrite, UA 11/01/2021 neg   Final    Protein, POC 11/01/2021 trace   Final    Glucose, UA 11/01/2021 norm   Final    Ketones, UA 11/01/2021 neg   Final    Urobilinogen, UA 11/01/2021 norm   Final    Bilirubin, POC 11/01/2021 neg   Final    Blood, UA 11/01/2021 50   Final    Clarity, UA 11/01/2021 Cloudy   Final    Spec Grav UA 11/01/2021 1.025   Final   Lab Visit on 10/25/2021   Component Date Value Ref Range Status    WBC 10/25/2021 7.51  3.90 - 12.70 K/uL Final    RBC 10/25/2021 4.90  4.00 - 5.40 M/uL Final    Hemoglobin 10/25/2021 13.2  12.0 - 16.0 g/dL Final    Hematocrit 10/25/2021 39.4  37.0 - 48.5 % Final    MCV 10/25/2021 80 (A) 82 - 98 fL Final    MCH 10/25/2021 26.9 (A) 27.0 - 31.0 pg Final    MCHC 10/25/2021 33.5  32.0 - 36.0 g/dL Final    RDW 10/25/2021 17.9 (A) 11.5 - 14.5 % Final    Platelets 10/25/2021 269  150 - 450 K/uL Final    MPV 10/25/2021 10.1  9.2 - 12.9 fL Final    Immature Granulocytes 10/25/2021 0.3  0.0 - 0.5 % Final    Gran # (ANC) 10/25/2021 2.4  1.8 - 7.7 K/uL Final    Immature Grans (Abs) 10/25/2021 0.02  0.00 - 0.04 K/uL Final    Lymph # 10/25/2021 3.6  1.0 - 4.8 K/uL Final    Mono # 10/25/2021 1.4 (A) 0.3 - 1.0 K/uL Final    Eos # 10/25/2021 0.0  0.0 - 0.5 K/uL Final    Baso # 10/25/2021 0.05  0.00 - 0.20 K/uL Final    nRBC 10/25/2021 0  0 /100 WBC Final    Gran % 10/25/2021 32.3 (A) 38.0 - 73.0 % Final    Lymph % 10/25/2021 47.7  18.0 - 48.0 % Final    Mono % 10/25/2021 19.0 (A) 4.0 - 15.0 %  Final    Eosinophil % 10/25/2021 0.0  0.0 - 8.0 % Final    Basophil % 10/25/2021 0.7  0.0 - 1.9 % Final    Differential Method 10/25/2021 Automated   Final    Sodium 10/25/2021 135 (A) 136 - 145 mmol/L Final    Potassium 10/25/2021 4.9  3.5 - 5.1 mmol/L Final    Chloride 10/25/2021 101  95 - 110 mmol/L Final    CO2 10/25/2021 28  23 - 29 mmol/L Final    Glucose 10/25/2021 115 (A) 70 - 110 mg/dL Final    BUN 10/25/2021 16  8 - 23 mg/dL Final    Creatinine 10/25/2021 0.9  0.5 - 1.4 mg/dL Final    Calcium 10/25/2021 9.8  8.7 - 10.5 mg/dL Final    Total Protein 10/25/2021 7.4  6.0 - 8.4 g/dL Final    Albumin 10/25/2021 2.9 (A) 3.5 - 5.2 g/dL Final    Total Bilirubin 10/25/2021 0.6  0.1 - 1.0 mg/dL Final    Alkaline Phosphatase 10/25/2021 96  55 - 135 U/L Final    AST 10/25/2021 20  10 - 40 U/L Final    ALT 10/25/2021 17  10 - 44 U/L Final    Anion Gap 10/25/2021 6 (A) 8 - 16 mmol/L Final    eGFR if African American 10/25/2021 >60.0  >60 mL/min/1.73 m^2 Final    eGFR if non African American 10/25/2021 >60.0  >60 mL/min/1.73 m^2 Final   (      XR Chest Ap Pa Lateral 2 VW    Anatomical Region Laterality Modality   Chest -- Computed Radiography       Impression    No radiographic evidence of acute cardiopulmonary process.     Preliminary Report Dictated By: YAMILKA ARNOLD MD     Electronically Signed By: Maxwell Carrasco MD 3/23/2022 4:36 PM CDT    Narrative    [facility] [place]ERAL 2 VW     REASON FOR STUDY: Chest Pain.     COMPARISON: None.     FINDINGS:     SUPPORT LINES & TUBES: Left-sided dual-lead cardiac device in place.     LUNG BORJA: No evidence of edema, airspace disease, mass, or effusion. No evidence of pneumothorax.     HEART & MEDIASTINUM: Coronary artery stents in place. Cardiomediastinal silhouette within normal limits.     LARRY: Normal.     AIRWAYS: Normal.     BONES & JOINTS: No acute abnormality.     SOFT TISSUES OF THORAX & UPPER ABDOMEN: Within normal limits.    Procedure  Note    Maxwell Carrasco MD - 03/23/2022   Formatting of this note might be different from the original.   [facility] [place] CHEST AP PA LATERAL 2 VW     REASON FOR STUDY: Chest Pain.     COMPARISON: None.     FINDINGS:     SUPPORT LINES & TUBES: Left-sided dual-lead cardiac device in place.     LUNG BORJA: No evidence of edema, airspace disease, mass, or effusion. No evidence of pneumothorax.     HEART & MEDIASTINUM: Coronary artery stents in place. Cardiomediastinal silhouette within normal limits.     LARRY: Normal.     AIRWAYS: Normal.     BONES & JOINTS: No acute abnormality.     SOFT TISSUES OF THORAX & UPPER ABDOMEN: Within normal limits.     IMPRESSION:   No radiographic evidence of acute cardiopulmonary process.     Preliminary Report Dictated By: YAMILKA ARNOLD MD     Electronically Signed By: Maxwell Carrasco MD 3/23/2022 4:36 PM CDT  Exam End: 03/23/22  3:24 PM    Specimen Collected: 03/23/22  3:25 PM Last Resulted: 03/23/22  4:36 PM   Received From: [facility] [place] [facility] [place]  Result Received: 04/04/22  2:04 PM     herwise unremarkable.    Procedure Note    Ramya Adam MD - 03/24/2022   Formatting of this note might be different from the original.   [facility] [place] CT ABDOMEN PELVIS WITH CONTRAST     ICD10: R07.9   Chest pain, unspecified type   REASON FOR STUDY: Abdominal abscess/infection suspected.     TECHNIQUE:   Axial images of the abdomen and pelvis were obtained following the administration of IV contrast.     CONTRAST: IOHEXOL 350 MG IODINE/ML INTRAVENOUS SOLUTION:<See Chart>;  IOHEXOL 350 MG IODINE/ML INTRAVENOUS SOLUTION:<See Chart>;  IOHEXOL 350 MG IODINE/ML INTRAVENOUS SOLUTION:<See Chart>;  IOHEXOL 350 MG IODINE/ML INTRAVENOUS SOLUTION:10mL     RADIATION DOSE: 634.9 (mGy.cm)     This CT utilized automated exposure control and/or adjustment of the mA according to patient size and/or iterative reconstruction technique(s).     COMPARISON: None.       FINDINGS:     HEART BASE: Coronary atherosclerosis. Pacemaker  leads. Dilated left ventricle. Otherwise unremarkable.     LIVER/BILE DUCTS: Left hepatic hypodensity too small to characterize. This most likely represents a small cyst. The liver is otherwise unremarkable. No intra or extrahepatic bile duct dilatation. The portal vein is patent.     GALLBLADDER: Unremarkable.     SPLEEN: Small wedge-shaped defect most likely a remote infarct. Prominent in size but otherwise unremarkable.     ADRENAL GLANDS: Unremarkable.     PANCREAS: Unremarkable.     STOMACH, SMALL BOWEL, LARGE BOWEL: The stomach is unremarkable. The small bowel is unremarkable.     The pelvis appears abnormal with diffuse wall thickening of much of the sigmoid colon with scattered diverticula and pericolonic inflammatory change suggestive of acute diverticulitis. There is an approximately 4.5 cm long fistula tract extending from the inferior aspect of the mid to distal sigmoid colon inferiorly between the rectum and the vagina (series 203, images 162-179). It is unclear if this fistula tract inferiorly involves the rectum (colocolonic fistula) or the vagina. Additionally: There is the suggestion of a possible fistula tract between the mid sigmoid colon and the urinary bladder (series 203, images 160-162, series 207 images 58-60). The large bowel demonstrates prominent liquid stool but is otherwise unremarkable.     APPENDIX: Unremarkable.     KIDNEYS & URETERS: Left renal cyst and left renal hypodensities too small to characterize. These most likely represents other small cysts. Otherwise unremarkable. No hydronephrosis.     URINARY BLADDER: Diffuse wall thickening with intraluminal air and fluid. Otherwise unremarkable.     AORTA: Atherosclerotic and ectatic but nonaneurysmal. Otherwise unremarkable     LYMPH NODES & PERITONEAL SPACE: There are a few mildly prominent pelvic sidewall and retroperitoneal lymph nodes. Largest node is to the left of the aorta and the retroperitoneum measuring 9 mm on short axis  ((series 203, image #90). No ascites. No free air.     REPRODUCTIVE ORGANS: Unremarkable.     EXTRAPERITONEUM: Unremarkable. No inguinal lymphadenopathy.     LUNG BASES: Peripheral reticular opacities of the lung bases possibly edema or atelectasis but nonspecific. Otherwise unremarkable.     BONES: Degenerative change of the spine. Otherwise unremarkable.       IMPRESSION:     1. Acute diverticulitis with 2 somewhat thin extraluminal collections of fluid that are worrisome for fistula tracts, one extending to the urinary bladder and the other extending inferiorly from the sigmoid colon possibly to the vagina or to the rectum. No drainable abscess collection is seen. No free air.     2. Diffuse wall thickening of the urinary bladder worrisome for cystitis.     3. Ancillary findings as above.     Electronically Signed By: Ramya Adam MD 3/24/2022 9:21 AM CDT  Exam End: 22 11:52 PM    Specimen Collected: 22  8:41 AM Last Resulted: 22  9:21 AM   Received From: [facility] [place] [facility] [employer]  Result Received: 22  2:04 PM     Transthoracic Echocardiogram (TTE) Complete      Narrative  Performed by [employer]  TRANSTHORACIC ECHOCARDIOGRAM REPORT     Name:   JERE        MRN#:        [id number]   Study Date:    3/24/2022           DIETER   :    1955       Accession#:  [id number] Room #:        2117   Sex:    F               Account #:   [id number]     [facility]      [address] [address]                                                     Provider:      ARLEEN SIMMONS   Height: 63 in           Weight:      115 lb       BSA:           1.53 m²   Age:    66 years        Patient      OBS          Primary                           Type:                     Provider:       Fellow Supervised:        Nora Mack MD   Procedure Performed:      Complete Echocardiogram (2D, M-Mode, Doppler) - CPT                             [address]   Study Priority:           Routine   Study Quality:            This was a technically  difficult study.   Indications / Past        I50.9 Congestive heart failure   History:   Sonographer / Comments:   Braxton Tu SILVERIO   Blood Pressure:           109/58 mmHg       PROCEDURE: Microbubble ultrasound enhancing contrast agent (Optison) was given IV to enhance endocardial border definition.     CARDIAC MEASUREMENTS (normal ranges within parentheses):   +--------+------+---------+ +-----------+-----+---------+   Left          Normal    Aorta/Left      Normal      Ventricl                Atrium:                     e:                      +-----------+-----+---------+   +--------+------+---------+ Left Atrium4.11 (1.9-4.0)   IVSd    0.73  (0.7-1.1) (2D):      cm               (2D):   cm              +-----------+-----+---------+   +--------+------+---------+   LVPWd   0.81  (0.7-1.1)   (2D):   cm                +--------+------+---------+   LVIDd   7.05  (3.4-5.7)   (2D):   cm                +--------+------+---------+   LVIDs   6.53              (2D):   cm                +--------+------+---------+   Relative0.23  (<0.42)     Wall                      Thicknes                  s:                        +--------+------+---------+   LV Mass 155.02            index:  g/m²              +--------+------+---------+       LV DIASTOLIC FUNCTION:   +--------+--------+---------+--------+--------+----------+---------+-----------+   MV Peak 0.83 m/se', MV   0.04    LV IVRT:LA Vol             (<34ml/m²)    E:              Gloria:                     Index:                           +--------+--------+---------+--------+--------+----------+---------+-----------+   MV Peak 0.89 m/sE/e'     20.76   RUPV A  PASP:     19.49    (<35mmHg)     A:              Ratio:           Vmax:             mmHg                   +--------+--------+---------+--------+--------+----------+---------+-----------+   E/A     0.93     Decel    207 msecRUPV A  PADP:              (<15mmHg)     Ratio:         Time:            Dur:                                     +--------+--------+---------+--------+--------+----------+---------+-----------+       SPECTRAL DOPPLER ANAYLSIS (where applicable):   Mitral Valve:   +------------+--------------+--------+--------------+----------+   MV Max Thad: MV Area, PHT: 3.66 cm²MV P1/2 Time: 60.05 msec   +------------+--------------+--------+--------------+----------+     Aortic Valve:   +--------------+--------+---------------+---------+----------+-------------+   LVOT Vmax:    0.74 m/sAoV Mean P.01 mmHgLVOT diam:2.15 cm         +--------------+--------+---------------+---------+----------+-------------+   LVOT VTI      0.13 m  AoV Area, Vmax:1.96 cm² LVSVi:    31.40 ml/m²     +--------------+--------+---------------+---------+----------+-------------+   AoV Vmax      1.38 m/sAoV Area, VTI: 2.14 cm² CO:       3.56 l/min      +--------------+--------+---------------+---------+----------+-------------+   LVOT/ZONIA (DI):0.59    AI Half-time:           CI        2.34 l/min/m²   +--------------+--------+---------------+---------+----------+-------------+     Tricuspid Valve and PA/RV Systolic Pressure:   +----------------+--------+------------+------+----------+-------+   TR Max Velocity:2.04 m/Claudy Pressure:5 mmHgRVSP/PASP:19 mmHg   +----------------+--------+------------+------+----------+-------+       PHYSICIAN INTERPRETATION by Chanda Cooper MD:     RHYTHM: Sinus rhythm. The average ventricular rate was 74 bpm.   LEFT VENTRICLE: Calculated left ventricular geometry shows eccentric left ventricular hypertrophy pattern. Severely increased left ventricular cavity size. Global left ventricular systolic function is severely decreased. Left ventricular ejection fraction is less than 20%. Rest of the walls are hypokinetic. Diastolic dysfunction is  present. The left ventricular diastolic function is mildly abnormal (Grade I). Analysis of strain segmental curves and bullseye plot was not possible on this study.   LV Wall Scoring:     The apical septal segment, apical lateral segment, and apex are dyskinetic.     LV HEMODYNAMICS: The calculated cardiac output is 3.56 l/min. The LV stroke volume index is 31.4 ml/m². Borderline increased estimated systemic vascular resistance of 1570 dynes·s/cm5. Borderline decreased estimated cardiac power output of 0.59 Flowers.   RIGHT VENTRICLE: The right ventricular size is normal. Right ventricular wall thickness is normal. Overall RV systolic function is normal. Normal tricuspid annular plane systolic excursion (TAPSE), measuring 1.7 cm. A pacer or defibrillator wire lead system is seen in right ventricle.   LEFT ATRIUM: The left atrium is moderately dilated.   RIGHT ATRIUM: The right atrial size is normal, measuring 26.2 mL/m². Estimated right atrial pressure is 5 mmHg.   AORTIC VALVE: The native aortic valve is trileaflet. No evidence of aortic sclerosis or stenosis. No aortic valve regurgitation is seen.   MITRAL VALVE: The mitral valve appears normal. No evidence of mitral valve stenosis. Mild mitral valve regurgitation.   TRICUSPID VALVE: Normal tricuspid valve leaflets. No tricuspid regurgitation is present.   PULMONIC VALVE: Trace pulmonic regurgitation. No evidence of pulmonic stenosis.   AORTA: The visualized portions of the aortic root, ascending aorta, aortic arch, descending thoracic aorta and abdominal aorta are normal. The largest aortic root diameter in diastole measures 3.1 cm.   PULMONARY ARTERY: The estimated right ventricular systolic pressure is normal at 22 mmHg.   VENA CAVAE: The inferior vena cava is normal in size, measuring 1.4 cm in diameter. The IVC demonstrates greater than 50% decrease in size with respiration.   PERICARDIUM: No pericardial effusion.     SUMMARY:    1. Eccentric left ventricular  hypertrophy.    2. Severely decreased left ventricular systolic function. LVEF of < 20%.    3. Severely increased left ventricular cavity size.    4. LV diastolic function is mildly abnormal (Grade I).    5. Moderately dilated left atrium.    6. Mild mitral valve regurgitation.    7. Normal right ventricular systolic function.    8. Apical septal segment, apical lateral segment, and apex are abnormal.    9. Rest of the walls are hypokinetic.     Electronically signed by Chanda Cooper MD on 3/24/2022 at 11:21:02 AM        report may not trend or trigger automated decision support.       Troponin I  Order: [id number]   Ref Range & Units 11 d ago   Troponin I <=0.04 ng/mL 0.04    Resulting Agency  [facility]   Specimen Collected: 03/24/22 11:57 Last Resulted: 03/24/22 13:42   Received From: [facility] [place] [place] [email or web address]  Result Received: 04/04/22 14:04       Outside Information       suggestion  Information displayed in this report may not trend or trigger automated decision support.       Magnesium  Order: [id number]   Ref Range & Units 10 d ago   Magnesium 1.5 - 2.6 mg/dL 1.8    Resulting Agency  [facility]   Specimen Collected: 03/25/22 05:25 Last Resulted: 03/25/22 06:50   Received From: [employer]  Result Received: 04/04/22 14:04   ·  View Encounter     ·  Received Information          [facility] Results Release    [facility] Status: Active  Results Release       [facility]  Outside Information       suggestion  Information displayed in this report may not trend or trigger automated decision support.        Contains abnormal data Basic Metabolic Panel  Order: [id number]   Ref Range & Units 10 d ago   Sodium 135 - 146 mmol/L 134 Low     Potassium 3.6 - 5.2 mmol/L 3.9    Chloride 96 - 110 mmol/L 97    Carbon Dioxide 24 - 32 mmol/L 28    Glucose 65 - 99 mg/dL 88    Calcium 8.4 - 10.3 mg/dL 8.4    BUN 7.0 - 25.0 mg/dL 6.0 Low     Creatinine 0.50 - 1.10 mg/dL 0.60    EGFR,  >89 mL/min >105    EGFR, Non   >=89 mL/min 96    Resulting Agency  [facility]   Specimen Collected: 03/25/22 05:25 Last Resulted: 03/25/22 06:50   Received From: [facility]  Result Received: 04/04/22 14:04       Outside Information       suggestion  Information displayed in this report may not trend or trigger automated decision support.        Contains abnormal data CBC  Order: [id number]   Ref Range & Units 10 d ago   WBC 4.5 - 11.0 103/uL 3.8 Low     RBC 4.00 - 5.20 106/uL 4.21    Hemoglobin 12.0 - 16.0 gm/dL 11.4 Low     Hematocrit 35.0 - 46.0 % 33.8 Low     MCV 80.0 - 100.0 fL 80.2    MCH 26.0 - 34.0 pg 27.0    MCHC 31.0 - 37.0 g/dL 33.7    RDW 11.5 - 14.5 % 22.2 High     Platelet Count 130 - 400 103/uL 223    MPV 7.4 - 10.4 fL 8.5    Resulting Agency  [facility]   Specimen Collected: 03/25/22 05:25 Last Resulted: 03/25/22 06:39   Received From: [facility]  Result Received: 04/04/22 14:04     Patient Information    Name MRN Description   Odette Hart [id number] 66 y.o. female       Physicians    Panel Physicians Referring Physician Case Authorizing Physician   Yohannes Cordova MD (Primary)       Indications    NSTEMI (non-ST elevated myocardial infarction) [I21.4 (ICD-10-CM)]     Summary       · The left ventricular end diastolic pressure was elevated.  · The pre-procedure left ventricular end diastolic pressure was 20.  · The Mid LAD lesion was 70% stenosed.  · The Prox Cx lesion was 80% stenosed with 0% stenosis post-intervention.  · The Mid Cx lesion was 90% stenosed with 0% stenosis post-intervention.  · The Prox RCA lesion was 50% stenosed.  · The RPDA lesion was 70% stenosed.  · The RPAV lesion was 60% stenosed.  · A STENT RESOLUTE AMBER [id number] stent was successfully placed at 16 PJ for 10 sec.  · A STENT RESOLUTE AMBER [id number] stent was successfully placed at 12 PJ for 15 sec.  · The estimated blood loss was <50 mL.  · There was three vessel coronary artery disease.  · The PCI was successful.     The procedure log was  documented by Documenter: RT Waqas and verified by Yohannes Cordova MD.     Date: 1/24/2022  Time: 5:19 PM    0747 2/1/2022 STAT     Narrative & Impression  EXAMINATION:  XR CHEST 1 VIEW     CLINICAL HISTORY:  shortness of breath;     TECHNIQUE:  Single frontal view of the chest was performed.     FINDINGS:  The lungs are clear.  There is no pneumothorax or pleural fluid.  The cardiac silhouette is not enlarged.  There is a left-sided cardiac defibrillator.  The osseous structures demonstrate degenerative change.     Impression:     As above.        Electronically signed by: Abelardo Fisher MD  Date:                                            02/01/2022  Time:                                           10:06             Exam Ended: 02/01/22 10:03 Last Resulted: 02/01/22 10:06                Details    Reading Physician Reading Date Result Priority   Gigi Lawson MD  [phone]  [phone] 1/21/2022 STAT     Narrative & Impression  EXAMINATION:  XR CHEST AP PORTABLE     CLINICAL HISTORY:  Chest Pain;     TECHNIQUE:  Single frontal view of the chest was performed.     COMPARISON:  08/02/2021     FINDINGS:  ICD remains in place with leads in similar position as compared to prior exam.  Cardiac silhouette is magnified by portable AP technique.  The heart appears to be at the upper limits of normal in size and unchanged.  Tortuous thoracic aorta.  Pulmonary vascularity is a little more prominent as compared to the previous study and may be mildly congested.  Lungs are satisfactorily expanded.  Mild interstitial accentuation bilaterally which could represent interstitial edema.  There may be a little perihilar alveolar filling on the right.  No pleural fluid or pneumothorax.  Skeletal structures appear intact.     Impression:     Pulmonary vascularity is prominent and may be mildly congested.  Interstitial accentuation bilaterally which may represent mild interstitial edema.  There may be some perihilar alveolar  filling on the right as well.  Findings are concerning for mild congestive heart failure.     This report was flagged in Epic as abnormal.        Electronically signed by: Gigi Lawson MD  Date:                                            01/21/2022         Assessment:     1. Automatic implantable cardioverter-defibrillator in situ    2. Chronic combined systolic and diastolic congestive heart failure    3. Coronary artery disease of native artery of native heart with stable angina pectoris    4. Familial hypercholesterolemia    5. History of coronary artery stent placement    6. History of myocardial infarction    7. Ischemic cardiomyopathy    8. Primary hypertension    9. RBBB with left anterior fascicular block    10. Colovesical fistula      Plan:   Odette was seen today for follow-up.    Diagnoses and all orders for this visit:    Automatic implantable cardioverter-defibrillator in situ  -     IN OFFICE EKG 12-LEAD (to [place])    Chronic combined systolic and diastolic congestive heart failure  -     IN OFFICE EKG 12-LEAD (to [place])    Coronary artery disease of native artery of native heart with stable angina pectoris  -     IN OFFICE EKG 12-LEAD (to [place])    Familial hypercholesterolemia  -     IN OFFICE EKG 12-LEAD (to [facility])    History of coronary artery stent placement  -     IN OFFICE EKG 12-LEAD (to [facility])    History of myocardial infarction  -     IN OFFICE EKG 12-LEAD (to [facility])    Ischemic cardiomyopathy  -     IN OFFICE EKG 12-LEAD (to [facility])    Primary hypertension  -     IN OFFICE EKG 12-LEAD (to [facility])    RBBB with left anterior fascicular block  -     IN OFFICE EKG 12-LEAD (to [facility])    Colovesical fistula    CHF is adequately compensated.    Severe ischemic cardiomyopathy is stable.    Pt may have thrush from prolonged antibiotics.    Same meds     Pt has an appt 4/7/22 with GI Dr Sotelo.    Could colonoscopy be performed while holding the Effient but continuing the ASA?    Could partial colectomy and  colovesicular fistula repair be performed after holding Effient for a week but while still taking ASA 81 mg daily?    The risk of stent thrombosis from holding Effient and especially from holding both Effient and ASA discussed with pt.    The ongoing risk of sepsis from delaying colon surgery discussed.    It may be safer to proceed with surgery.    Pt is at increased risk of heart attack and CHF from surgery.    Pt passes flatus and feces from her bladder daily.  Pt states she also passes feces from her vagina.    Follow up in about 3 months (around 7/4/2022).

## 2022-04-07 ENCOUNTER — OFFICE VISIT (OUTPATIENT)
Dept: GASTROENTEROLOGY | Facility: CLINIC | Age: 67
End: 2022-04-07
Payer: MEDICARE

## 2022-04-07 VITALS
WEIGHT: 117.38 LBS | BODY MASS INDEX: 20.8 KG/M2 | HEART RATE: 95 BPM | HEIGHT: 63 IN | DIASTOLIC BLOOD PRESSURE: 55 MMHG | OXYGEN SATURATION: 97 % | SYSTOLIC BLOOD PRESSURE: 112 MMHG

## 2022-04-07 DIAGNOSIS — R10.9 ABDOMINAL PAIN, UNSPECIFIED ABDOMINAL LOCATION: ICD-10-CM

## 2022-04-07 DIAGNOSIS — K57.90 DIVERTICULOSIS: ICD-10-CM

## 2022-04-07 DIAGNOSIS — R19.7 DIARRHEA, UNSPECIFIED TYPE: ICD-10-CM

## 2022-04-07 DIAGNOSIS — N32.1 COLOVESICAL FISTULA: Primary | ICD-10-CM

## 2022-04-07 PROCEDURE — 1159F MED LIST DOCD IN RCRD: CPT | Mod: CPTII,S$GLB,, | Performed by: INTERNAL MEDICINE

## 2022-04-07 PROCEDURE — 1159F PR MEDICATION LIST DOCUMENTED IN MEDICAL RECORD: ICD-10-PCS | Mod: CPTII,S$GLB,, | Performed by: INTERNAL MEDICINE

## 2022-04-07 PROCEDURE — 99204 PR OFFICE/OUTPT VISIT, NEW, LEVL IV, 45-59 MIN: ICD-10-PCS | Mod: S$GLB,,, | Performed by: INTERNAL MEDICINE

## 2022-04-07 PROCEDURE — 3078F PR MOST RECENT DIASTOLIC BLOOD PRESSURE < 80 MM HG: ICD-10-PCS | Mod: CPTII,S$GLB,, | Performed by: INTERNAL MEDICINE

## 2022-04-07 PROCEDURE — 3008F PR BODY MASS INDEX (BMI) DOCUMENTED: ICD-10-PCS | Mod: CPTII,S$GLB,, | Performed by: INTERNAL MEDICINE

## 2022-04-07 PROCEDURE — 3288F PR FALLS RISK ASSESSMENT DOCUMENTED: ICD-10-PCS | Mod: CPTII,S$GLB,, | Performed by: INTERNAL MEDICINE

## 2022-04-07 PROCEDURE — 1101F PT FALLS ASSESS-DOCD LE1/YR: CPT | Mod: CPTII,S$GLB,, | Performed by: INTERNAL MEDICINE

## 2022-04-07 PROCEDURE — 99499 UNLISTED E&M SERVICE: CPT | Mod: S$GLB,,, | Performed by: INTERNAL MEDICINE

## 2022-04-07 PROCEDURE — 3078F DIAST BP <80 MM HG: CPT | Mod: CPTII,S$GLB,, | Performed by: INTERNAL MEDICINE

## 2022-04-07 PROCEDURE — 3074F SYST BP LT 130 MM HG: CPT | Mod: CPTII,S$GLB,, | Performed by: INTERNAL MEDICINE

## 2022-04-07 PROCEDURE — 1125F AMNT PAIN NOTED PAIN PRSNT: CPT | Mod: CPTII,S$GLB,, | Performed by: INTERNAL MEDICINE

## 2022-04-07 PROCEDURE — 99499 RISK ADDL DX/OHS AUDIT: ICD-10-PCS | Mod: S$GLB,,, | Performed by: INTERNAL MEDICINE

## 2022-04-07 PROCEDURE — 4010F ACE/ARB THERAPY RXD/TAKEN: CPT | Mod: CPTII,S$GLB,, | Performed by: INTERNAL MEDICINE

## 2022-04-07 PROCEDURE — 3008F BODY MASS INDEX DOCD: CPT | Mod: CPTII,S$GLB,, | Performed by: INTERNAL MEDICINE

## 2022-04-07 PROCEDURE — 1101F PR PT FALLS ASSESS DOC 0-1 FALLS W/OUT INJ PAST YR: ICD-10-PCS | Mod: CPTII,S$GLB,, | Performed by: INTERNAL MEDICINE

## 2022-04-07 PROCEDURE — 99999 PR PBB SHADOW E&M-EST. PATIENT-LVL IV: CPT | Mod: PBBFAC,,, | Performed by: INTERNAL MEDICINE

## 2022-04-07 PROCEDURE — 3288F FALL RISK ASSESSMENT DOCD: CPT | Mod: CPTII,S$GLB,, | Performed by: INTERNAL MEDICINE

## 2022-04-07 PROCEDURE — 99999 PR PBB SHADOW E&M-EST. PATIENT-LVL IV: ICD-10-PCS | Mod: PBBFAC,,, | Performed by: INTERNAL MEDICINE

## 2022-04-07 PROCEDURE — 4010F PR ACE/ARB THEARPY RXD/TAKEN: ICD-10-PCS | Mod: CPTII,S$GLB,, | Performed by: INTERNAL MEDICINE

## 2022-04-07 PROCEDURE — 99204 OFFICE O/P NEW MOD 45 MIN: CPT | Mod: S$GLB,,, | Performed by: INTERNAL MEDICINE

## 2022-04-07 PROCEDURE — 3074F PR MOST RECENT SYSTOLIC BLOOD PRESSURE < 130 MM HG: ICD-10-PCS | Mod: CPTII,S$GLB,, | Performed by: INTERNAL MEDICINE

## 2022-04-07 PROCEDURE — 1125F PR PAIN SEVERITY QUANTIFIED, PAIN PRESENT: ICD-10-PCS | Mod: CPTII,S$GLB,, | Performed by: INTERNAL MEDICINE

## 2022-04-07 RX ORDER — DICYCLOMINE HYDROCHLORIDE 10 MG/1
10 CAPSULE ORAL 4 TIMES DAILY PRN
Qty: 120 CAPSULE | Refills: 3 | Status: ON HOLD | OUTPATIENT
Start: 2022-04-07 | End: 2022-01-01 | Stop reason: HOSPADM

## 2022-04-07 NOTE — PROGRESS NOTES
Subjective:       Patient ID: Odette Hart is a 66 y.o. female.    Chief Complaint: Abdominal Pain and Emesis    Patient here today to establish care with aforementioned complaints.  Patient reports history of abdominal pain and dysuria which has been going on for several months.  She also reports passing feces from her bladder as well as 8 year with urination.  She has been hospitalized both within the [facility] system and outside.  She has seen different surgeons, however there has not to perform surgery due to recent MI requiring placement of drug-eluting stents in January.  She reports significant weight loss as she has modified her diet significantly in effort to decrease her diarrhea.  She is currently on chronic antibiotics (at [facility] and Flagyl).      CT abdomen pelvis 03/2022 ([facility])  1. Acute diverticulitis with 2 somewhat thin extraluminal collections of fluid that are worrisome for fistula tracts, one extending to the urinary bladder and the other extending inferiorly from the sigmoid colon possibly to the vagina or to the rectum. No drainable abscess collection is seen. No free air.     2. Diffuse wall thickening of the urinary bladder worrisome for cystitis.     3. Ancillary findings as above.       CT Abd/P 2/2022  Impression:     1. Sigmoid colon extensive diverticulosis with wall thickening and surrounding fat stranding suggestive of recurrent diverticulitis.  No free air or intraperitoneal fluid collection.  Given the chronicity of sigmoid colon wall thickening compared to the October 25, 2021 examination, consider sigmoidoscopy follow-up to exclude neoplasia.  2. No rectal contrast extravasation to suggest rectovaginal fistula, however there is persistent gas within the bladder, cul-de-sac, and left adnexae which could be seen in vesicovaginal fistula or colorectal fistula.  An inflamed diverticulum is contiguous with abnormal urinary bladder wall thickening superolaterally on the left.  CT  findings are suspicious but not definitive or colovesical fistula.  Could be further evaluated with nonemergent fluoroscopic examination, as clinically indicated.  3. Atherosclerotic calcification of the abdominal aorta with infrarenal ectasia.  4. Additional findings as above.  This report was flagged in Epic as abnormal.      Past Medical History:   Diagnosis Date    Acute coronary syndrome     Allergy     Arthritis     Cardiomyopathy     CHF (congestive heart failure)     Coronary artery disease     Coronary artery disease of native artery of native heart with stable angina pectoris 2021: [facility]: Cath: LAD: Proximal stent patent. LCX: Proximal 80%. LCX: Dominant. Moderate disease. LCX: HEVER 2.75 x 18 mm. Distal dissection. HEVER 2.75 x 22 mm.     Diverticulitis     Diverticulosis     Familial hypercholesterolemia 2022    Former smoker 2022    Heart attack     Heart disease     History of myocardial infarction 2022    Hyperlipidemia     Hypertension     Hypertension 2022    ICD (implantable cardioverter-defibrillator) in place     Non-ST elevation myocardial infarction (NSTEMI) 2019       Past Surgical History:   Procedure Laterality Date    ATRIAL CARDIAC PACEMAKER INSERTION       SECTION      CORONARY STENT PLACEMENT      LEFT HEART CATHETERIZATION Left 2022    Procedure: CATHETERIZATION, HEART, LEFT;  Surgeon: Yohannes Cordova MD;  Location: [facility] CATH LAB;  Service: Cardiology;  Laterality: Left;       Social History  Social History     Tobacco Use    Smoking status: Former Smoker     Packs/day: 0.50     Start date: 1976     Quit date: 2012     Years since quittin.3    Smokeless tobacco: Never Used   Substance Use Topics    Alcohol use: No    Drug use: No       Family History   Problem Relation Age of Onset    Heart disease Mother     Emphysema Father     Heart attack Brother        Review of Systems   Constitutional:  Positive for unexpected weight change.   Gastrointestinal: Positive for abdominal distention, abdominal pain, diarrhea, nausea and vomiting.   Genitourinary: Positive for dysuria.   Neurological: Positive for weakness.           Objective:      Physical Exam  Constitutional:       Appearance: She is well-developed.   HENT:      Head: Normocephalic and atraumatic.   Eyes:      Conjunctiva/sclera: Conjunctivae normal.   Pulmonary:      Effort: Pulmonary effort is normal. No respiratory distress.   Musculoskeletal:      Cervical back: Normal range of motion.   Neurological:      Mental Status: She is alert and oriented to person, place, and time.   Psychiatric:         Behavior: Behavior normal.         Thought Content: Thought content normal.         Judgment: Judgment normal.           Pertinent labs and imaging studies reviewed    Assessment:       1. Colovesical fistula    2. Abdominal pain, unspecified abdominal location    3. Diarrhea, unspecified type    4. Diverticulosis        Plan:       Complicated case.  Patient has what sounds like a colovesicular fistula which needs surgery, however she does have recent MI and is currently on dual antiplatelet therapy  Case discussed with Dr. Arenas.  Despite relative increased risk of InStent thrombosis he is willing to allow patient to stop Plavix temporarily for surgery assuming she can continue on aspirin  Patient has previously been referred to colorectal surgery however canceled her appointment due to admission for urinary tract infection.  Will reach back out to see if she can get rescheduled  Will also refer to urology.  May need urogyn  In the meantime continue of suppressive antibiotics  Offered Bentyl for abdominal pain however uncertain whether not his can help    (Portions of this note were dictated using voice recognition software and may contain dictation related errors in spelling/grammar/syntax not found on text review)

## 2022-04-20 ENCOUNTER — OFFICE VISIT (OUTPATIENT)
Dept: UROLOGY | Facility: CLINIC | Age: 67
DRG: 391 | End: 2022-04-20
Payer: MEDICARE

## 2022-04-20 ENCOUNTER — HOSPITAL ENCOUNTER (INPATIENT)
Facility: HOSPITAL | Age: 67
LOS: 7 days | Discharge: NURSING, REHAB OR LONG-TERM CARE | DRG: 391 | End: 2022-04-27
Attending: COLON & RECTAL SURGERY | Admitting: COLON & RECTAL SURGERY
Payer: MEDICARE

## 2022-04-20 VITALS
HEART RATE: 69 BPM | WEIGHT: 117.06 LBS | SYSTOLIC BLOOD PRESSURE: 84 MMHG | BODY MASS INDEX: 20.74 KG/M2 | HEIGHT: 63 IN | DIASTOLIC BLOOD PRESSURE: 52 MMHG

## 2022-04-20 DIAGNOSIS — N32.1 COLOVESICAL FISTULA: ICD-10-CM

## 2022-04-20 DIAGNOSIS — R30.0 DYSURIA: Primary | ICD-10-CM

## 2022-04-20 DIAGNOSIS — N82.4 COLOVAGINAL FISTULA: Primary | ICD-10-CM

## 2022-04-20 LAB
ALBUMIN SERPL BCP-MCNC: 2.1 G/DL (ref 3.5–5.2)
ALP SERPL-CCNC: 68 U/L (ref 55–135)
ALT SERPL W/O P-5'-P-CCNC: 9 U/L (ref 10–44)
ANION GAP SERPL CALC-SCNC: 8 MMOL/L (ref 8–16)
AST SERPL-CCNC: 16 U/L (ref 10–40)
BASOPHILS # BLD AUTO: 0.03 K/UL (ref 0–0.2)
BASOPHILS NFR BLD: 0.9 % (ref 0–1.9)
BILIRUB SERPL-MCNC: 0.6 MG/DL (ref 0.1–1)
BUN SERPL-MCNC: 8 MG/DL (ref 8–23)
CALCIUM SERPL-MCNC: 8.4 MG/DL (ref 8.7–10.5)
CHLORIDE SERPL-SCNC: 100 MMOL/L (ref 95–110)
CO2 SERPL-SCNC: 28 MMOL/L (ref 23–29)
CREAT SERPL-MCNC: 0.6 MG/DL (ref 0.5–1.4)
DIFFERENTIAL METHOD: ABNORMAL
EOSINOPHIL # BLD AUTO: 0 K/UL (ref 0–0.5)
EOSINOPHIL NFR BLD: 0 % (ref 0–8)
ERYTHROCYTE [DISTWIDTH] IN BLOOD BY AUTOMATED COUNT: 22.2 % (ref 11.5–14.5)
EST. GFR  (AFRICAN AMERICAN): >60 ML/MIN/1.73 M^2
EST. GFR  (NON AFRICAN AMERICAN): >60 ML/MIN/1.73 M^2
GLUCOSE SERPL-MCNC: 99 MG/DL (ref 70–110)
HCT VFR BLD AUTO: 34.9 % (ref 37–48.5)
HGB BLD-MCNC: 11.4 G/DL (ref 12–16)
IMM GRANULOCYTES # BLD AUTO: 0.01 K/UL (ref 0–0.04)
IMM GRANULOCYTES NFR BLD AUTO: 0.3 % (ref 0–0.5)
LYMPHOCYTES # BLD AUTO: 1.5 K/UL (ref 1–4.8)
LYMPHOCYTES NFR BLD: 45.6 % (ref 18–48)
MCH RBC QN AUTO: 27.4 PG (ref 27–31)
MCHC RBC AUTO-ENTMCNC: 32.7 G/DL (ref 32–36)
MCV RBC AUTO: 84 FL (ref 82–98)
MONOCYTES # BLD AUTO: 0.9 K/UL (ref 0.3–1)
MONOCYTES NFR BLD: 28.8 % (ref 4–15)
NEUTROPHILS # BLD AUTO: 0.8 K/UL (ref 1.8–7.7)
NEUTROPHILS NFR BLD: 24.4 % (ref 38–73)
NRBC BLD-RTO: 0 /100 WBC
PLATELET # BLD AUTO: 205 K/UL (ref 150–450)
PMV BLD AUTO: 11.6 FL (ref 9.2–12.9)
POTASSIUM SERPL-SCNC: 4.4 MMOL/L (ref 3.5–5.1)
PREALB SERPL-MCNC: 7 MG/DL (ref 20–43)
PROT SERPL-MCNC: 6.4 G/DL (ref 6–8.4)
RBC # BLD AUTO: 4.16 M/UL (ref 4–5.4)
SODIUM SERPL-SCNC: 136 MMOL/L (ref 136–145)
WBC # BLD AUTO: 3.2 K/UL (ref 3.9–12.7)

## 2022-04-20 PROCEDURE — 3288F FALL RISK ASSESSMENT DOCD: CPT | Mod: CPTII,S$GLB,, | Performed by: UROLOGY

## 2022-04-20 PROCEDURE — 85025 COMPLETE CBC W/AUTO DIFF WBC: CPT | Performed by: COLON & RECTAL SURGERY

## 2022-04-20 PROCEDURE — 84134 ASSAY OF PREALBUMIN: CPT | Performed by: STUDENT IN AN ORGANIZED HEALTH CARE EDUCATION/TRAINING PROGRAM

## 2022-04-20 PROCEDURE — 3078F DIAST BP <80 MM HG: CPT | Mod: CPTII,S$GLB,, | Performed by: UROLOGY

## 2022-04-20 PROCEDURE — 1125F AMNT PAIN NOTED PAIN PRSNT: CPT | Mod: CPTII,S$GLB,, | Performed by: UROLOGY

## 2022-04-20 PROCEDURE — 87186 SC STD MICRODIL/AGAR DIL: CPT | Performed by: UROLOGY

## 2022-04-20 PROCEDURE — 87088 URINE BACTERIA CULTURE: CPT | Performed by: UROLOGY

## 2022-04-20 PROCEDURE — 3288F PR FALLS RISK ASSESSMENT DOCUMENTED: ICD-10-PCS | Mod: CPTII,S$GLB,, | Performed by: UROLOGY

## 2022-04-20 PROCEDURE — 63600175 PHARM REV CODE 636 W HCPCS: Performed by: STUDENT IN AN ORGANIZED HEALTH CARE EDUCATION/TRAINING PROGRAM

## 2022-04-20 PROCEDURE — 99223 PR INITIAL HOSPITAL CARE,LEVL III: ICD-10-PCS | Mod: AI,,, | Performed by: COLON & RECTAL SURGERY

## 2022-04-20 PROCEDURE — 99205 PR OFFICE/OUTPT VISIT, NEW, LEVL V, 60-74 MIN: ICD-10-PCS | Mod: S$GLB,,, | Performed by: UROLOGY

## 2022-04-20 PROCEDURE — 3074F PR MOST RECENT SYSTOLIC BLOOD PRESSURE < 130 MM HG: ICD-10-PCS | Mod: CPTII,S$GLB,, | Performed by: UROLOGY

## 2022-04-20 PROCEDURE — 3078F PR MOST RECENT DIASTOLIC BLOOD PRESSURE < 80 MM HG: ICD-10-PCS | Mod: CPTII,S$GLB,, | Performed by: UROLOGY

## 2022-04-20 PROCEDURE — 4010F PR ACE/ARB THEARPY RXD/TAKEN: ICD-10-PCS | Mod: CPTII,S$GLB,, | Performed by: UROLOGY

## 2022-04-20 PROCEDURE — 99999 PR PBB SHADOW E&M-EST. PATIENT-LVL III: CPT | Mod: PBBFAC,,, | Performed by: UROLOGY

## 2022-04-20 PROCEDURE — 1101F PT FALLS ASSESS-DOCD LE1/YR: CPT | Mod: CPTII,S$GLB,, | Performed by: UROLOGY

## 2022-04-20 PROCEDURE — 1101F PR PT FALLS ASSESS DOC 0-1 FALLS W/OUT INJ PAST YR: ICD-10-PCS | Mod: CPTII,S$GLB,, | Performed by: UROLOGY

## 2022-04-20 PROCEDURE — 99999 PR PBB SHADOW E&M-EST. PATIENT-LVL III: ICD-10-PCS | Mod: PBBFAC,,, | Performed by: UROLOGY

## 2022-04-20 PROCEDURE — 36415 COLL VENOUS BLD VENIPUNCTURE: CPT | Performed by: COLON & RECTAL SURGERY

## 2022-04-20 PROCEDURE — 3074F SYST BP LT 130 MM HG: CPT | Mod: CPTII,S$GLB,, | Performed by: UROLOGY

## 2022-04-20 PROCEDURE — 87086 URINE CULTURE/COLONY COUNT: CPT | Performed by: UROLOGY

## 2022-04-20 PROCEDURE — 1125F PR PAIN SEVERITY QUANTIFIED, PAIN PRESENT: ICD-10-PCS | Mod: CPTII,S$GLB,, | Performed by: UROLOGY

## 2022-04-20 PROCEDURE — 80053 COMPREHEN METABOLIC PANEL: CPT | Performed by: COLON & RECTAL SURGERY

## 2022-04-20 PROCEDURE — 87077 CULTURE AEROBIC IDENTIFY: CPT | Mod: 59 | Performed by: UROLOGY

## 2022-04-20 PROCEDURE — 3008F BODY MASS INDEX DOCD: CPT | Mod: CPTII,S$GLB,, | Performed by: UROLOGY

## 2022-04-20 PROCEDURE — 99223 1ST HOSP IP/OBS HIGH 75: CPT | Mod: AI,,, | Performed by: COLON & RECTAL SURGERY

## 2022-04-20 PROCEDURE — 25000003 PHARM REV CODE 250: Performed by: STUDENT IN AN ORGANIZED HEALTH CARE EDUCATION/TRAINING PROGRAM

## 2022-04-20 PROCEDURE — 20600001 HC STEP DOWN PRIVATE ROOM

## 2022-04-20 PROCEDURE — 99205 OFFICE O/P NEW HI 60 MIN: CPT | Mod: S$GLB,,, | Performed by: UROLOGY

## 2022-04-20 PROCEDURE — 4010F ACE/ARB THERAPY RXD/TAKEN: CPT | Mod: CPTII,S$GLB,, | Performed by: UROLOGY

## 2022-04-20 PROCEDURE — 3008F PR BODY MASS INDEX (BMI) DOCUMENTED: ICD-10-PCS | Mod: CPTII,S$GLB,, | Performed by: UROLOGY

## 2022-04-20 RX ORDER — ENOXAPARIN SODIUM 100 MG/ML
40 INJECTION SUBCUTANEOUS EVERY 24 HOURS
Status: DISCONTINUED | OUTPATIENT
Start: 2022-04-21 | End: 2022-04-27 | Stop reason: HOSPADM

## 2022-04-20 RX ORDER — ACETAMINOPHEN 325 MG/1
650 TABLET ORAL EVERY 4 HOURS PRN
Status: DISCONTINUED | OUTPATIENT
Start: 2022-04-20 | End: 2022-04-27 | Stop reason: HOSPADM

## 2022-04-20 RX ORDER — PROCHLORPERAZINE EDISYLATE 5 MG/ML
5 INJECTION INTRAMUSCULAR; INTRAVENOUS EVERY 6 HOURS PRN
Status: DISCONTINUED | OUTPATIENT
Start: 2022-04-20 | End: 2022-04-27 | Stop reason: HOSPADM

## 2022-04-20 RX ORDER — DEXTROSE MONOHYDRATE AND SODIUM CHLORIDE 5; .9 G/100ML; G/100ML
INJECTION, SOLUTION INTRAVENOUS CONTINUOUS
Status: DISCONTINUED | OUTPATIENT
Start: 2022-04-20 | End: 2022-04-22

## 2022-04-20 RX ORDER — ONDANSETRON 2 MG/ML
4 INJECTION INTRAMUSCULAR; INTRAVENOUS EVERY 8 HOURS PRN
Status: DISCONTINUED | OUTPATIENT
Start: 2022-04-20 | End: 2022-04-27 | Stop reason: HOSPADM

## 2022-04-20 RX ORDER — SODIUM CHLORIDE 0.9 % (FLUSH) 0.9 %
10 SYRINGE (ML) INJECTION
Status: DISCONTINUED | OUTPATIENT
Start: 2022-04-20 | End: 2022-04-27 | Stop reason: HOSPADM

## 2022-04-20 RX ORDER — NALOXONE HCL 0.4 MG/ML
0.02 VIAL (ML) INJECTION
Status: DISCONTINUED | OUTPATIENT
Start: 2022-04-20 | End: 2022-04-27 | Stop reason: HOSPADM

## 2022-04-20 RX ADMIN — SODIUM CHLORIDE, SODIUM LACTATE, POTASSIUM CHLORIDE, AND CALCIUM CHLORIDE 1000 ML: .6; .31; .03; .02 INJECTION, SOLUTION INTRAVENOUS at 05:04

## 2022-04-20 RX ADMIN — DEXTROSE AND SODIUM CHLORIDE: 5; .9 INJECTION, SOLUTION INTRAVENOUS at 07:04

## 2022-04-20 RX ADMIN — ONDANSETRON 4 MG: 2 INJECTION INTRAMUSCULAR; INTRAVENOUS at 11:04

## 2022-04-20 RX ADMIN — PIPERACILLIN SODIUM AND TAZOBACTAM SODIUM 4.5 G: 4; .5 INJECTION, POWDER, FOR SOLUTION INTRAVENOUS at 07:04

## 2022-04-20 NOTE — H&P
[facility]  Colorectal Surgery  History & Physical    Patient Name: Odette Hart  MRN: [id number]  Admission Date: (Not on file)  Attending Physician: Joseph Potter MD   Primary Care Provider: Braxton Barragan MD    Subjective:     Chief Complaint/Reason for Admission: Abdominal pain    History of Present Illness: 66F with complicated cardiac history including CAD s/p PCI (1/24/22) on dual antiplatelet therapy, CHF (EF 20%), HTN, ICD, and complicated diverticulitis with colovesical fistula. She describes fecaluria which is getting progressively worse. She also notes night sweats, poor PO tolerance, nausea, weight loss, and abdominal pain. She tries to take her medication but often feels like things are stuck in her throat and and vomits the pills up. She has not been taking her prescribed antibiotics. She overall feels weak and like she is having trouble managing her health conditions. She denies CP or SOB. She has no new LLQ abdominal pain. She has never had a colonoscopy as she was inpatient at the time her previous scope was scheduled. She presents today from Urology clinic for evaluation and hospital admission.    No medications prior to admission.       Review of patient's allergies indicates:   Allergen Reactions    Augmentin [amoxicillin-pot clavulanate] Swelling     Not allergic to amoxicillin just a derivative of augmentin    Lisinopril Other (See Comments)     Fluid around heart    Losartan Other (See Comments)     High potassium    Shellfish containing products Anaphylaxis     Pt.states she is allergic to SEAFOOD since the age of 13    Levaquin [levofloxacin] Other (See Comments)     Very bad joint pain    Clindamycin Palpitations     Chest pain       Past Medical History:   Diagnosis Date    Acute coronary syndrome     Allergy     Arthritis     Cardiomyopathy     CHF (congestive heart failure)     Coronary artery disease     Coronary artery disease of native artery of native  heart with stable angina pectoris 2021: [facility]: Cath: LAD: Proximal stent patent. LCX: Proximal 80%. LCX: Dominant. Moderate disease. LCX: HEVER 2.75 x 18 mm. Distal dissection. HEVER 2.75 x 22 mm.     Diverticulitis     Diverticulosis     Familial hypercholesterolemia 2022    Former smoker 2022    Heart attack     Heart disease     History of myocardial infarction 2022    Hyperlipidemia     Hypertension     Hypertension 2022    ICD (implantable cardioverter-defibrillator) in place     Non-ST elevation myocardial infarction (NSTEMI) 2019     Past Surgical History:   Procedure Laterality Date    ATRIAL CARDIAC PACEMAKER INSERTION       SECTION      CORONARY STENT PLACEMENT      LEFT HEART CATHETERIZATION Left 2022    Procedure: CATHETERIZATION, HEART, LEFT;  Surgeon: Yohannes Cordova MD;  Location: [facility] CATH LAB;  Service: Cardiology;  Laterality: Left;     Family History     Problem Relation (Age of Onset)    Emphysema Father    Heart attack Brother    Heart disease Mother        Tobacco Use    Smoking status: Former Smoker     Packs/day: 0.50     Start date: 1976     Quit date: 2012     Years since quittin.3    Smokeless tobacco: Never Used   Substance and Sexual Activity    Alcohol use: No    Drug use: No    Sexual activity: Not on file     Review of Systems   Constitutional: Positive for activity change, appetite change, fever and unexpected weight change. Negative for chills.   HENT: Negative for congestion and sore throat.    Eyes: Negative for pain and redness.   Respiratory: Negative for cough and shortness of breath.    Cardiovascular: Negative for chest pain and palpitations.   Gastrointestinal: Positive for abdominal pain, nausea and vomiting. Negative for abdominal distention, constipation and diarrhea.   Genitourinary: Positive for dysuria. Negative for hematuria.   Musculoskeletal: Negative for arthralgias and myalgias.    Skin: Negative for color change and pallor.   Neurological: Positive for dizziness and weakness.   Psychiatric/Behavioral: Negative for agitation and confusion.     Objective:     Vital Signs (Most Recent):    Vital Signs (24h Range):  Pulse:  [69] 69  BP: (84)/(52) 84/52        There is no height or weight on file to calculate BMI.    Physical Exam  Constitutional:       Appearance: Normal appearance.   HENT:      Head: Normocephalic and atraumatic.      Nose: Nose normal.   Eyes:      Extraocular Movements: Extraocular movements intact.   Cardiovascular:      Rate and Rhythm: Normal rate and regular rhythm.      Heart sounds: Normal heart sounds.   Pulmonary:      Effort: Pulmonary effort is normal. No respiratory distress.   Abdominal:      Comments: Soft, nondistended, mild lower abdominal tenderness to palpation   Musculoskeletal:         General: No swelling or deformity. Normal range of motion.      Cervical back: Normal range of motion.   Skin:     General: Skin is warm and dry.   Neurological:      General: No focal deficit present.      Mental Status: She is alert and oriented to person, place, and time.   Psychiatric:         Mood and Affect: Mood normal.         Behavior: Behavior normal.       Significant Labs:  Pending    Significant Diagnostics:  Pending    Assessment/Plan:     66F with complicated cardiac history including CAD s/p PCI (1/24/22) on dual antiplatelet therapy, CHF (EF 20%), HTN, ICD, and complicated diverticulitis with colovesical fistula. She presents with overall failure to thrive, poor PO intake, night sweats, weight loss, abdominal pain, and worsening fecaluria. Will plan for admission, medical optimization, and further workup.  -Admit to Colorectal Surgery  -Pain control  -Monitor vitals, hemodynamics  -Encourage IS  -Clears as tolerated, nutrition labs  -CT A/P with PO and IV contrast  -Full labs  -Monitor UOP, follow up outpatient urology note/labs  -SCD/lovenox for DVT  ppx  -Continue home DAPT for now, will likely involve cardiology if need for surgical intervention  -Encourage OOB  -IV zosyn, trend WBC  -Discussed with Dr. Tariq Candelario MD  Colorectal Surgery  [facility]

## 2022-04-20 NOTE — H&P (VIEW-ONLY)
[facility]  Colorectal Surgery  History & Physical    Patient Name: Odette Hart  MRN: [id number]  Admission Date: (Not on file)  Attending Physician: Joseph Potter MD   Primary Care Provider: Braxton Barragan MD    Subjective:     Chief Complaint/Reason for Admission: Abdominal pain    History of Present Illness: 66F with complicated cardiac history including CAD s/p PCI (1/24/22) on dual antiplatelet therapy, CHF (EF 20%), HTN, ICD, and complicated diverticulitis with colovesical fistula. She describes fecaluria which is getting progressively worse. She also notes night sweats, poor PO tolerance, nausea, weight loss, and abdominal pain. She tries to take her medication but often feels like things are stuck in her throat and and vomits the pills up. She has not been taking her prescribed antibiotics. She overall feels weak and like she is having trouble managing her health conditions. She denies CP or SOB. She has no new LLQ abdominal pain. She has never had a colonoscopy as she was inpatient at the time her previous scope was scheduled. She presents today from Urology clinic for evaluation and hospital admission.    No medications prior to admission.       Review of patient's allergies indicates:   Allergen Reactions    Augmentin [amoxicillin-pot clavulanate] Swelling     Not allergic to amoxicillin just a derivative of augmentin    Lisinopril Other (See Comments)     Fluid around heart    Losartan Other (See Comments)     High potassium    Shellfish containing products Anaphylaxis     Pt.states she is allergic to SEAFOOD since the age of 13    Levaquin [levofloxacin] Other (See Comments)     Very bad joint pain    Clindamycin Palpitations     Chest pain       Past Medical History:   Diagnosis Date    Acute coronary syndrome     Allergy     Arthritis     Cardiomyopathy     CHF (congestive heart failure)     Coronary artery disease     Coronary artery disease of native artery of native  heart with stable angina pectoris 2021: [facility]: Cath: LAD: Proximal stent patent. LCX: Proximal 80%. LCX: Dominant. Moderate disease. LCX: HEVER 2.75 x 18 mm. Distal dissection. HEVER 2.75 x 22 mm.     Diverticulitis     Diverticulosis     Familial hypercholesterolemia 2022    Former smoker 2022    Heart attack     Heart disease     History of myocardial infarction 2022    Hyperlipidemia     Hypertension     Hypertension 2022    ICD (implantable cardioverter-defibrillator) in place     Non-ST elevation myocardial infarction (NSTEMI) 2019     Past Surgical History:   Procedure Laterality Date    ATRIAL CARDIAC PACEMAKER INSERTION       SECTION      CORONARY STENT PLACEMENT      LEFT HEART CATHETERIZATION Left 2022    Procedure: CATHETERIZATION, HEART, LEFT;  Surgeon: Yohannes Cordova MD;  Location: [facility] CATH LAB;  Service: Cardiology;  Laterality: Left;     Family History     Problem Relation (Age of Onset)    Emphysema Father    Heart attack Brother    Heart disease Mother        Tobacco Use    Smoking status: Former Smoker     Packs/day: 0.50     Start date: 1976     Quit date: 2012     Years since quittin.3    Smokeless tobacco: Never Used   Substance and Sexual Activity    Alcohol use: No    Drug use: No    Sexual activity: Not on file     Review of Systems   Constitutional: Positive for activity change, appetite change, fever and unexpected weight change. Negative for chills.   HENT: Negative for congestion and sore throat.    Eyes: Negative for pain and redness.   Respiratory: Negative for cough and shortness of breath.    Cardiovascular: Negative for chest pain and palpitations.   Gastrointestinal: Positive for abdominal pain, nausea and vomiting. Negative for abdominal distention, constipation and diarrhea.   Genitourinary: Positive for dysuria. Negative for hematuria.   Musculoskeletal: Negative for arthralgias and myalgias.    Skin: Negative for color change and pallor.   Neurological: Positive for dizziness and weakness.   Psychiatric/Behavioral: Negative for agitation and confusion.     Objective:     Vital Signs (Most Recent):    Vital Signs (24h Range):  Pulse:  [69] 69  BP: (84)/(52) 84/52        There is no height or weight on file to calculate BMI.    Physical Exam  Constitutional:       Appearance: Normal appearance.   HENT:      Head: Normocephalic and atraumatic.      Nose: Nose normal.   Eyes:      Extraocular Movements: Extraocular movements intact.   Cardiovascular:      Rate and Rhythm: Normal rate and regular rhythm.      Heart sounds: Normal heart sounds.   Pulmonary:      Effort: Pulmonary effort is normal. No respiratory distress.   Abdominal:      Comments: Soft, nondistended, mild lower abdominal tenderness to palpation   Musculoskeletal:         General: No swelling or deformity. Normal range of motion.      Cervical back: Normal range of motion.   Skin:     General: Skin is warm and dry.   Neurological:      General: No focal deficit present.      Mental Status: She is alert and oriented to person, place, and time.   Psychiatric:         Mood and Affect: Mood normal.         Behavior: Behavior normal.       Significant Labs:  Pending    Significant Diagnostics:  Pending    Assessment/Plan:     66F with complicated cardiac history including CAD s/p PCI (1/24/22) on dual antiplatelet therapy, CHF (EF 20%), HTN, ICD, and complicated diverticulitis with colovesical fistula. She presents with overall failure to thrive, poor PO intake, night sweats, weight loss, abdominal pain, and worsening fecaluria. Will plan for admission, medical optimization, and further workup.  -Admit to Colorectal Surgery  -Pain control  -Monitor vitals, hemodynamics  -Encourage IS  -Clears as tolerated, nutrition labs  -CT A/P with PO and IV contrast  -Full labs  -Monitor UOP, follow up outpatient urology note/labs  -SCD/lovenox for DVT  ppx  -Continue home DAPT for now, will likely involve cardiology if need for surgical intervention  -Encourage OOB  -IV zosyn, trend WBC  -Discussed with Dr. Tariq Candelario MD  Colorectal Surgery  [facility]

## 2022-04-20 NOTE — PROGRESS NOTES
CHIEF COMPLAINT:    Mrs. Hart is a 66 y.o. female presenting for a consultation at the request of Dr. Lucio Sotelo. Patient presents with .    PRESENTING ILLNESS:    Odette Hart is a 66 y.o. female who has a known enterovesical fistula.  The patient is here with her friend Carie.  They both express a sort of desperation at the patient's situation.  The patient has been in and out of the hospital since February with nausea, vomiting and abdominal pain.  She passes stool in the urine and has pneumaturia.  She also has diarrhea which I suspect is partially caused by urine.  She states she had some apple sauce yesterday.  She was seen at the [place] [facility] 2 days ago, was not given fluids was discharged with prescriptions for cefdinir suspension and flagyl suspension, neither of which she has been able to obtain.  She states that she was scheduled for a follow up after seeing Dr. Potter in the hospital in Feb, but was admitted to [facility] and was unable to make that appointment.     Apparently, when she was initially seen in the hospital in February, she had just had an MI.  She just saw her cardiologist on 4/4/2022 (Dr. Arenas)  She has not had a colonoscopy nor has she had a cystoscopy.  She has been unable to take her antiplatelet therapy due to the nausea.  She states she has dysphagia.      She knows she has pelvic organ prolapse, desired a hysterectomy many years ago but was refused for unknown reason.      REVIEW OF SYSTEMS:    Review of Systems   Constitutional:        Feels ill   HENT: Negative.    Eyes: Negative.    Respiratory: Negative.    Cardiovascular: Negative.         History of CAD   Gastrointestinal: Positive for diarrhea and nausea.   Genitourinary: Positive for dysuria.   Musculoskeletal: Negative.    Skin: Negative.    Neurological: Negative.    Endo/Heme/Allergies: Negative.    Psychiatric/Behavioral: Negative.        PATIENT HISTORY:    Past Medical History:   Diagnosis  Date    Acute coronary syndrome     Allergy     Arthritis     Cardiomyopathy     CHF (congestive heart failure)     Coronary artery disease     Coronary artery disease of native artery of native heart with stable angina pectoris 2021: [facility]: Cath: LAD: Proximal stent patent. LCX: Proximal 80%. LCX: Dominant. Moderate disease. LCX: HEVER 2.75 x 18 mm. Distal dissection. HEVER 2.75 x 22 mm.     Diverticulitis     Diverticulosis     Familial hypercholesterolemia 2022    Former smoker 2022    Heart attack     Heart disease     History of myocardial infarction 2022    Hyperlipidemia     Hypertension     Hypertension 2022    ICD (implantable cardioverter-defibrillator) in place     Non-ST elevation myocardial infarction (NSTEMI) 2019       Past Surgical History:   Procedure Laterality Date    ATRIAL CARDIAC PACEMAKER INSERTION       SECTION      CORONARY STENT PLACEMENT      LEFT HEART CATHETERIZATION Left 2022    Procedure: CATHETERIZATION, HEART, LEFT;  Surgeon: Yohannes Cordova MD;  Location: [facility] CATH LAB;  Service: Cardiology;  Laterality: Left;       Family History   Problem Relation Age of Onset    Heart disease Mother     Emphysema Father     Heart attack Brother        Social History     Socioeconomic History    Marital status:    Tobacco Use    Smoking status: Former Smoker     Packs/day: 0.50     Start date: 1976     Quit date: 2012     Years since quittin.3    Smokeless tobacco: Never Used   Substance and Sexual Activity    Alcohol use: No    Drug use: No       Allergies:  Augmentin [amoxicillin-pot clavulanate], Lisinopril, Losartan, Shellfish containing products, Levaquin [levofloxacin], and Clindamycin    Medications:  Outpatient Encounter Medications as of 2022   Medication Sig Dispense Refill    acetaminophen (TYLENOL) 500 MG tablet Take 2 tablets (1,000 mg total) by mouth every 8 (eight) hours as  needed for Pain.  0    albuterol (PROVENTIL/VENTOLIN HFA) 90 mcg/actuation inhaler Inhale 2 puffs into the lungs every 6 (six) hours as needed for Wheezing. Rescue 18 g 1    ALPRAZolam (XANAX) 0.5 MG tablet Take 1 tablet (0.5 mg total) by mouth nightly as needed for Anxiety. 30 tablet 2    aspirin (ECOTRIN) 81 MG EC tablet Take 1 tablet (81 mg total) by mouth once daily. 90 tablet 0    carvediloL (COREG) 12.5 MG tablet Take 1 tablet (12.5 mg total) by mouth 2 (two) times daily with meals. 180 tablet 3    cefdinir (OMNICEF) 125 mg/5 mL suspension Take 12 mLs (300 mg total) by mouth every 12 (twelve) hours. 720 mL 0    dicyclomine (BENTYL) 10 MG capsule Take 1 capsule (10 mg total) by mouth 4 (four) times daily as needed. 120 capsule 3    ezetimibe (ZETIA) 10 mg tablet Take 1 tablet (10 mg total) by mouth every evening. 90 tablet 3    fluticasone propionate (FLONASE) 50 mcg/actuation nasal spray 1 spray (50 mcg total) by Each Nostril route once daily. 9.9 mL 1    Lactobacillus rhamnosus GG (CULTURELLE) 10 billion cell capsule Take 1 capsule by mouth once daily.      metronidazole (FLAGYL) 50 mg/mL Syrg Take 10 mLs (500 mg total) by mouth every 8 (eight) hours. 900 mL 0    omeprazole (PRILOSEC) 40 MG capsule Take 1 capsule (40 mg total) by mouth once daily. 90 capsule 2    prasugreL (EFFIENT) 10 mg Tab Take 1 tablet (10 mg total) by mouth once daily. 90 tablet 0    promethazine (PROMETHEGAN) 25 MG suppository Place 1 suppository (25 mg total) rectally every 6 (six) hours as needed for Nausea. 12 suppository 2    simethicone (MYLICON) 80 MG chewable tablet Take 80 mg by mouth every 8 (eight) hours as needed.      spironolactone (ALDACTONE) 25 MG tablet Take 1 tablet (25 mg total) by mouth once daily. 90 tablet 3    nitroGLYCERIN (NITROSTAT) 0.4 MG SL tablet Place 1 tablet (0.4 mg total) under the tongue every 5 (five) minutes as needed for Chest pain. (Patient not taking: Reported on 4/20/2022) 25 tablet  0     No facility-administered encounter medications on file as of 4/20/2022.         PHYSICAL EXAMINATION:    The patient generally appears in distress, is appropriately interactive.    Skin: No lesions.    Mental: Cooperative with normal affect.    Neuro: Grossly intact.    HEENT: Normal. No evidence of lymphadenopathy.    Chest:  normal inspiratory effort.    Abdomen:  Soft, non-tender. No masses or organomegaly. Bladder is not palpable. No evidence of flank discomfort. No evidence of inguinal hernia.    Extremities: No clubbing, cyanosis, or edema    Normal external female genitalia  Urethral meatus is normal  Urethra and bladder are nontender to bimanual exam  Well supported anteriorly and posteriorly   Uterus and cervix are normal, cervix is down to the hymenal ring.    No adnexal masses  Catheterized and had brown colored urine with particulate matter.      LABS:    Lab Results   Component Value Date    BUN 10 04/18/2022    CREATININE 0.7 04/18/2022       IMPRESSION:    Encounter Diagnoses   Name Primary?    Dysuria Yes    Colovesical fistula        PLAN:    1. Discussed the patient with Dr. Potter who kindly came to clinic to talk to the patient.    2.  She will be admitted to facilitate her care.  3.  I am happy to be available to assist.      Copy to:  Dr.s Freddy Arenas, Lucio Sotelo

## 2022-04-21 ENCOUNTER — PATIENT MESSAGE (OUTPATIENT)
Dept: CARDIOLOGY | Facility: CLINIC | Age: 67
End: 2022-04-21
Payer: MEDICARE

## 2022-04-21 PROBLEM — E43 SEVERE MALNUTRITION: Status: ACTIVE | Noted: 2022-04-21

## 2022-04-21 LAB
ANION GAP SERPL CALC-SCNC: 9 MMOL/L (ref 8–16)
BASOPHILS # BLD AUTO: 0.03 K/UL (ref 0–0.2)
BASOPHILS NFR BLD: 1 % (ref 0–1.9)
BUN SERPL-MCNC: 7 MG/DL (ref 8–23)
CALCIUM SERPL-MCNC: 8.3 MG/DL (ref 8.7–10.5)
CHLORIDE SERPL-SCNC: 102 MMOL/L (ref 95–110)
CO2 SERPL-SCNC: 25 MMOL/L (ref 23–29)
CREAT SERPL-MCNC: 0.7 MG/DL (ref 0.5–1.4)
DIFFERENTIAL METHOD: ABNORMAL
EOSINOPHIL # BLD AUTO: 0 K/UL (ref 0–0.5)
EOSINOPHIL NFR BLD: 0 % (ref 0–8)
ERYTHROCYTE [DISTWIDTH] IN BLOOD BY AUTOMATED COUNT: 21.2 % (ref 11.5–14.5)
EST. GFR  (AFRICAN AMERICAN): >60 ML/MIN/1.73 M^2
EST. GFR  (NON AFRICAN AMERICAN): >60 ML/MIN/1.73 M^2
GLUCOSE SERPL-MCNC: 85 MG/DL (ref 70–110)
HCT VFR BLD AUTO: 33.6 % (ref 37–48.5)
HGB BLD-MCNC: 10.6 G/DL (ref 12–16)
IMM GRANULOCYTES # BLD AUTO: 0 K/UL (ref 0–0.04)
IMM GRANULOCYTES NFR BLD AUTO: 0 % (ref 0–0.5)
LYMPHOCYTES # BLD AUTO: 1.5 K/UL (ref 1–4.8)
LYMPHOCYTES NFR BLD: 48.4 % (ref 18–48)
MCH RBC QN AUTO: 26.4 PG (ref 27–31)
MCHC RBC AUTO-ENTMCNC: 31.5 G/DL (ref 32–36)
MCV RBC AUTO: 84 FL (ref 82–98)
MONOCYTES # BLD AUTO: 0.8 K/UL (ref 0.3–1)
MONOCYTES NFR BLD: 25.6 % (ref 4–15)
NEUTROPHILS # BLD AUTO: 0.8 K/UL (ref 1.8–7.7)
NEUTROPHILS NFR BLD: 25 % (ref 38–73)
NRBC BLD-RTO: 0 /100 WBC
PLATELET # BLD AUTO: 265 K/UL (ref 150–450)
PLATELET BLD QL SMEAR: ABNORMAL
PMV BLD AUTO: 10.5 FL (ref 9.2–12.9)
POTASSIUM SERPL-SCNC: 3.9 MMOL/L (ref 3.5–5.1)
RBC # BLD AUTO: 4.01 M/UL (ref 4–5.4)
SODIUM SERPL-SCNC: 136 MMOL/L (ref 136–145)
WBC # BLD AUTO: 3.08 K/UL (ref 3.9–12.7)

## 2022-04-21 PROCEDURE — 36573 INSJ PICC RS&I 5 YR+: CPT

## 2022-04-21 PROCEDURE — 99232 PR SUBSEQUENT HOSPITAL CARE,LEVL II: ICD-10-PCS | Mod: ,,, | Performed by: NURSE PRACTITIONER

## 2022-04-21 PROCEDURE — 36415 COLL VENOUS BLD VENIPUNCTURE: CPT | Performed by: STUDENT IN AN ORGANIZED HEALTH CARE EDUCATION/TRAINING PROGRAM

## 2022-04-21 PROCEDURE — 94760 N-INVAS EAR/PLS OXIMETRY 1: CPT

## 2022-04-21 PROCEDURE — A4217 STERILE WATER/SALINE, 500 ML: HCPCS | Performed by: COLON & RECTAL SURGERY

## 2022-04-21 PROCEDURE — 85025 COMPLETE CBC W/AUTO DIFF WBC: CPT | Performed by: STUDENT IN AN ORGANIZED HEALTH CARE EDUCATION/TRAINING PROGRAM

## 2022-04-21 PROCEDURE — B4185 PARENTERAL SOL 10 GM LIPIDS: HCPCS | Performed by: COLON & RECTAL SURGERY

## 2022-04-21 PROCEDURE — 80048 BASIC METABOLIC PNL TOTAL CA: CPT | Performed by: STUDENT IN AN ORGANIZED HEALTH CARE EDUCATION/TRAINING PROGRAM

## 2022-04-21 PROCEDURE — 25000003 PHARM REV CODE 250: Performed by: STUDENT IN AN ORGANIZED HEALTH CARE EDUCATION/TRAINING PROGRAM

## 2022-04-21 PROCEDURE — 25500020 PHARM REV CODE 255: Performed by: COLON & RECTAL SURGERY

## 2022-04-21 PROCEDURE — 76937 US GUIDE VASCULAR ACCESS: CPT

## 2022-04-21 PROCEDURE — A4216 STERILE WATER/SALINE, 10 ML: HCPCS | Performed by: COLON & RECTAL SURGERY

## 2022-04-21 PROCEDURE — 99232 SBSQ HOSP IP/OBS MODERATE 35: CPT | Mod: ,,, | Performed by: NURSE PRACTITIONER

## 2022-04-21 PROCEDURE — 20600001 HC STEP DOWN PRIVATE ROOM

## 2022-04-21 PROCEDURE — 63600175 PHARM REV CODE 636 W HCPCS: Performed by: COLON & RECTAL SURGERY

## 2022-04-21 PROCEDURE — 63600175 PHARM REV CODE 636 W HCPCS: Performed by: STUDENT IN AN ORGANIZED HEALTH CARE EDUCATION/TRAINING PROGRAM

## 2022-04-21 PROCEDURE — C1751 CATH, INF, PER/CENT/MIDLINE: HCPCS

## 2022-04-21 PROCEDURE — 25000003 PHARM REV CODE 250: Performed by: COLON & RECTAL SURGERY

## 2022-04-21 PROCEDURE — 25500020 PHARM REV CODE 255: Performed by: STUDENT IN AN ORGANIZED HEALTH CARE EDUCATION/TRAINING PROGRAM

## 2022-04-21 RX ORDER — INSULIN ASPART 100 [IU]/ML
1-10 INJECTION, SOLUTION INTRAVENOUS; SUBCUTANEOUS EVERY 4 HOURS PRN
Status: DISCONTINUED | OUTPATIENT
Start: 2022-04-21 | End: 2022-04-27 | Stop reason: HOSPADM

## 2022-04-21 RX ORDER — GLUCAGON 1 MG
1 KIT INJECTION
Status: DISCONTINUED | OUTPATIENT
Start: 2022-04-21 | End: 2022-04-27 | Stop reason: HOSPADM

## 2022-04-21 RX ORDER — DEXTROSE MONOHYDRATE 100 MG/ML
INJECTION, SOLUTION INTRAVENOUS CONTINUOUS PRN
Status: DISCONTINUED | OUTPATIENT
Start: 2022-04-21 | End: 2022-04-27 | Stop reason: HOSPADM

## 2022-04-21 RX ORDER — SODIUM CHLORIDE 0.9 % (FLUSH) 0.9 %
10 SYRINGE (ML) INJECTION EVERY 6 HOURS
Status: DISCONTINUED | OUTPATIENT
Start: 2022-04-21 | End: 2022-04-27 | Stop reason: HOSPADM

## 2022-04-21 RX ORDER — SODIUM CHLORIDE 0.9 % (FLUSH) 0.9 %
10 SYRINGE (ML) INJECTION
Status: DISCONTINUED | OUTPATIENT
Start: 2022-04-21 | End: 2022-04-27 | Stop reason: HOSPADM

## 2022-04-21 RX ADMIN — MAGNESIUM SULFATE HEPTAHYDRATE: 500 INJECTION, SOLUTION INTRAMUSCULAR; INTRAVENOUS at 09:04

## 2022-04-21 RX ADMIN — IOHEXOL 15 ML: 350 INJECTION, SOLUTION INTRAVENOUS at 11:04

## 2022-04-21 RX ADMIN — PIPERACILLIN SODIUM AND TAZOBACTAM SODIUM 4.5 G: 4; .5 INJECTION, POWDER, FOR SOLUTION INTRAVENOUS at 05:04

## 2022-04-21 RX ADMIN — PIPERACILLIN SODIUM AND TAZOBACTAM SODIUM 4.5 G: 4; .5 INJECTION, POWDER, FOR SOLUTION INTRAVENOUS at 09:04

## 2022-04-21 RX ADMIN — IOHEXOL 15 ML: 350 INJECTION, SOLUTION INTRAVENOUS at 10:04

## 2022-04-21 RX ADMIN — IOHEXOL 75 ML: 350 INJECTION, SOLUTION INTRAVENOUS at 12:04

## 2022-04-21 RX ADMIN — ONDANSETRON 4 MG: 2 INJECTION INTRAMUSCULAR; INTRAVENOUS at 09:04

## 2022-04-21 RX ADMIN — Medication 10 ML: at 06:04

## 2022-04-21 RX ADMIN — I.V. FAT EMULSION 250 ML: 20 EMULSION INTRAVENOUS at 09:04

## 2022-04-21 RX ADMIN — PROCHLORPERAZINE EDISYLATE 5 MG: 5 INJECTION INTRAMUSCULAR; INTRAVENOUS at 12:04

## 2022-04-21 NOTE — ASSESSMENT & PLAN NOTE
Nutrition consulted. Most recent weight and BMI monitored-     Malnutrition Type and Energy Intake  Malnutrition Type: chronic illness    Malnutrition (Moderate to Severe)  Weight Loss (Malnutrition):  (14% x 3 months per chart review)  Energy Intake (Malnutrition): less than 75% for greater than or equal to 1 month (susected <75% x 3 months given wt loss)       PICC-TPN        Measurements:  Wt Readings from Last 1 Encounters:   04/20/22 53.1 kg (117 lb 1 oz)   Body mass index is 20.74 kg/m².    Recommendations: Recommendation/Intervention: 1.  Goals: receive nutrition by RD follow-up    Patient has been screened and assessed by RD. RD will follow patient.

## 2022-04-21 NOTE — ASSESSMENT & PLAN NOTE
Nutrition Problem:  Severe Protein-Calorie Malnutrition  Malnutrition in the context of Chronic Illness/Injury     Related to (etiology):  Inadequate Energy Intake; Altered GI Function      Signs and Symptoms (as evidenced by):  Energy Intake: <75% of estimated energy requirement for 1 month   Body Fat Depletion: KEO  Muscle Mass Depletion: KEO  Weight Loss: 14% x 3 months per chart review       Interventions (treatment strategy):  Collaboration of nutrition care with other providers   Parenteral Nutrition      Nutrition Diagnosis Status:  New

## 2022-04-21 NOTE — CONSULTS
Radiology Consult    Odette Hart is a 66 y.o. female with a history of complicated cardiac history including CAD s/p PCI (22) on dual antiplatelet therapy, CHF (EF 20%), HTN, ICD, and complicated diverticulitis with colovesical fistula. Recently admitted with worsening fecaluria and abd pain, n/v. Imaging findings of sigmoid/rectal fluid collection, possible fistula (in addition to known colovesical fistula).    IR consulted for sigmoid/rectal fluid collection aspiration.     Past Medical History:   Diagnosis Date    Acute coronary syndrome     Allergy     Arthritis     Cardiomyopathy     CHF (congestive heart failure)     Coronary artery disease     Coronary artery disease of native artery of native heart with stable angina pectoris 2021: [facility]: Cath: LAD: Proximal stent patent. LCX: Proximal 80%. LCX: Dominant. Moderate disease. LCX: HEVER 2.75 x 18 mm. Distal dissection. HEVER 2.75 x 22 mm.     Diverticulitis     Diverticulosis     Familial hypercholesterolemia 2022    Former smoker 2022    Heart attack     Heart disease     History of myocardial infarction 2022    Hyperlipidemia     Hypertension     Hypertension 2022    ICD (implantable cardioverter-defibrillator) in place     Non-ST elevation myocardial infarction (NSTEMI) 2019     Past Surgical History:   Procedure Laterality Date    ATRIAL CARDIAC PACEMAKER INSERTION       SECTION      CORONARY STENT PLACEMENT      LEFT HEART CATHETERIZATION Left 2022    Procedure: CATHETERIZATION, HEART, LEFT;  Surgeon: Yohannes Cordova MD;  Location: [facility] CATH LAB;  Service: Cardiology;  Laterality: Left;       Discussed with primary team, Dr. Candelario.    Imaging reviewed with Radiology staff, Dr. Draper.     Procedure: n/a    Scheduled Meds:    enoxaparin  40 mg Subcutaneous Daily    fat emulsion 20%  250 mL Intravenous Daily    piperacillin-tazobactam (ZOSYN) IVPB  4.5 g Intravenous Q8H     sodium chloride 0.9%  10 mL Intravenous Q6H     Continuous Infusions:    dextrose 10 % in water (D10W)      dextrose 5 % and 0.9 % NaCl 75 mL/hr at 04/20/22 1917    TPN ADULT CENTRAL LINE CUSTOM       PRN Meds:acetaminophen, dextrose 10 % in water (D10W), dextrose 10%, glucagon (human recombinant), insulin aspart U-100, naloxone, ondansetron, prochlorperazine, sodium chloride 0.9%, Flushing PICC Protocol **AND** sodium chloride 0.9% **AND** sodium chloride 0.9%    Allergies:   Review of patient's allergies indicates:   Allergen Reactions    Augmentin [amoxicillin-pot clavulanate] Swelling     Not allergic to amoxicillin just a derivative of augmentin    Lisinopril Other (See Comments)     Fluid around heart    Losartan Other (See Comments)     High potassium    Shellfish containing products Anaphylaxis     Pt.states she is allergic to SEAFOOD since the age of 13    Levaquin [levofloxacin] Other (See Comments)     Very bad joint pain    Clindamycin Palpitations     Chest pain       Labs:  No results for input(s): INR in the last 168 hours.    Invalid input(s):  PT,  PTT    Recent Labs   Lab 04/21/22  0629   WBC 3.08*   HGB 10.6*   HCT 33.6*   MCV 84         Recent Labs   Lab 04/20/22  2134 04/21/22  0629   GLU 99 85    136   K 4.4 3.9    102   CO2 28 25   BUN 8 7*   CREATININE 0.6 0.7   CALCIUM 8.4* 8.3*   ALT 9*  --    AST 16  --    ALBUMIN 2.1*  --    BILITOT 0.6  --          Vitals (Most Recent):  Temp: 98.2 °F (36.8 °C) (04/21/22 1548)  Pulse: 78 (04/21/22 1548)  Resp: 18 (04/21/22 1548)  BP: (!) 114/57 (04/21/22 1548)  SpO2: (!) 94 % (04/21/22 1548)    Physical Exam:  ASA: 3  Mallampati: 2     General: no acute distress, ill-appearing  Mental Status: alert and oriented to person, place and time  HEENT: normocephalic, atraumatic  Chest: unlabored breathing  Heart: regular heart rate  Abdomen: nondistended  Extremity: moves all extremities    Plan:   Imaging reviewed. Case discussed  with primary team. Sigmoid/rectal fluid collection is small in size and, if there is a fistula, aspiration would not achieve source control.  Procedure likely to be of limited benefit.     Thank you for the consult. Please contact with any further questions.     Dinesh Bah MD PGY2  Department of Radiology  [facility]   Offered and patient declined

## 2022-04-21 NOTE — PLAN OF CARE
[facility]  Initial Discharge Assessment       Primary Care Provider: Braxton Barragan MD    Admission Diagnosis: Colovesical fistula [N32.1]    Admission Date: 4/20/2022  Expected Discharge Date: 4/25/2022    Discharge Barriers Identified: Transportation    Payor: [employer] MANAGED MEDICARE / Plan: [employer] 65 / Product Type: Medicare Advantage /     Extended Emergency Contact Information  Primary Emergency Contact: Abelardo Ortiz  Mobile Phone: [phone]  Relation: Son  Secondary Emergency Contact: Suri Ortiz  Mobile Phone: [phone]  Relation: Daughter    Discharge Plan A: Home  Discharge Plan B: Home Health      [facility] - [place], [place] - [address]  [address]  [place] [address]  Phone: [phone] Fax: [phone]    [facility]  [address] [facility] [address]  [place] [address]  Phone: [phone] Fax: [phone]      Initial Assessment (most recent)     Adult Discharge Assessment - 04/21/22 1238        Discharge Assessment    Assessment Type Discharge Planning Assessment     Confirmed/corrected address, phone number and insurance Yes     Confirmed Demographics Correct on Facesheet     Source of Information patient     Communicated SIVAN with patient/caregiver Yes     Lives With alone   may have grand-daughter stay with her after discharge    Do you expect to return to your current living situation? Yes     Do you have help at home or someone to help you manage your care at home? Yes     Prior to hospitilization cognitive status: Alert/Oriented     Current cognitive status: Alert/Oriented     Walking or Climbing Stairs Difficulty none     Dressing/Bathing Difficulty none     Equipment Currently Used at Home none     Readmission within 30 days? No     Do you currently have service(s) that help you manage your care at home? No     Do you take prescription medications? Yes     Do you have prescription coverage? Yes      Do you have any problems affording any of your prescribed medications? No     Is the patient taking medications as prescribed? yes     Who is going to help you get home at discharge? Patient stated she may need help with transportation at d/c     Are you on dialysis? No     Do you take coumadin? No     Discharge Plan A Home     Discharge Plan B Home Health     DME Needed Upon Discharge  none     Discharge Plan discussed with: Patient     Discharge Barriers Identified Transportation                  Nahomy Jalloh RN, CM   Ext: [phone]

## 2022-04-21 NOTE — PLAN OF CARE
Recommendations    1. Suggest Custom TPN 75 gm AA / 235 gm Dex + IV lipids to provide 1599 kcal, 75 gm protein, and GIR = 3.07 mg/kg/min.     2. As medically able, ADAT to low fiber/residue + Boost Plus TID with texture per SLP     3. RD following.     Goals: receive nutrition by RD follow-up  Nutrition Goal Status: new

## 2022-04-21 NOTE — ASSESSMENT & PLAN NOTE
Admitted from home with known colovesical fistula    -cont IV Zosyn  -monitor I&O  -monitor vs  -surgical planning

## 2022-04-21 NOTE — PROGRESS NOTES
[facility]  Colorectal Surgery  Progress Note    Patient Name: Odette Hart  MRN: [id number]  Admission Date: 4/20/2022  Hospital Length of Stay: 1 days  Attending Physician: Joseph Potter MD    Subjective:     Interval History: no acute events overnite, minimal abd pain, no n/v    Post-Op Info:  * No surgery found *          Medications:  Continuous Infusions:   dextrose 10 % in water (D10W)      dextrose 5 % and 0.9 % NaCl 75 mL/hr at 04/20/22 1917    Standard Custom Day One ADULT TPN for patient with NO electrolyte abnormality or NO renal dysfunction (PERIPHERAL)       Scheduled Meds:   enoxaparin  40 mg Subcutaneous Daily    fat emulsion 20%  250 mL Intravenous Daily    piperacillin-tazobactam (ZOSYN) IVPB  4.5 g Intravenous Q8H     PRN Meds:   acetaminophen    dextrose 10 % in water (D10W)    dextrose 10%    glucagon (human recombinant)    insulin aspart U-100    iohexol    naloxone    ondansetron    prochlorperazine    sodium chloride 0.9%        Objective:     Vital Signs (Most Recent):  Temp: 97.9 °F (36.6 °C) (04/21/22 0858)  Pulse: 69 (04/21/22 0858)  Resp: (!) 22 (04/21/22 0858)  BP: (!) 101/55 (04/21/22 0858)  SpO2: 96 % (04/21/22 0858)   Vital Signs (24h Range):  Temp:  [96.3 °F (35.7 °C)-97.9 °F (36.6 °C)] 97.9 °F (36.6 °C)  Pulse:  [65-82] 69  Resp:  [16-22] 22  SpO2:  [92 %-100 %] 96 %  BP: ()/(52-55) 101/55     Intake/Output - Last 3 Shifts         04/19 0700 04/20 0659 04/20 0700 04/21 0659 04/21 0700 04/22 0659    IV Piggyback  884.3     Total Intake(mL/kg)  884.3 (16.7)     Net  +884.3            Stool Occurrence  0 x             Physical Exam  Vitals and nursing note reviewed.   Constitutional:       General: She is not in acute distress.     Appearance: She is well-developed. She is ill-appearing.   HENT:      Head: Normocephalic.   Eyes:      Pupils: Pupils are equal, round, and reactive to light.   Cardiovascular:      Rate and Rhythm: Normal rate and  regular rhythm.      Heart sounds: Normal heart sounds.   Pulmonary:      Effort: Pulmonary effort is normal. No respiratory distress.      Breath sounds: Normal breath sounds. No wheezing or rales.   Abdominal:      Palpations: Abdomen is soft. There is no mass.      Tenderness: There is no guarding or rebound.   Skin:     General: Skin is warm and dry.   Neurological:      Mental Status: She is alert and oriented to person, place, and time.   Psychiatric:         Behavior: Behavior normal.         Thought Content: Thought content normal.         Judgment: Judgment normal.           Significant Labs:  BMP (Last 3 Results):   Recent Labs   Lab 04/18/22 2009 04/20/22 2134 04/21/22  0629   * 99 85   * 136 136   K 4.1 4.4 3.9   CL 98 100 102   CO2 24 28 25   BUN 10 8 7*   CREATININE 0.7 0.6 0.7   CALCIUM 8.6* 8.4* 8.3*     CBC (Last 3 Results):   Recent Labs   Lab 04/18/22 2009 04/20/22  1713 04/21/22  0629   WBC 2.90* 3.20* 3.08*   RBC 3.91* 4.16 4.01   HGB 10.9* 11.4* 10.6*   HCT 32.7* 34.9* 33.6*    205 265   MCV 84 84 84   MCH 27.9 27.4 26.4*   MCHC 33.3 32.7 31.5*     CRP: No results for input(s): CRP in the last 168 hours.  Prealbumin:   Recent Labs   Lab 04/20/22 2134   PREALBUMIN 7*       Significant Diagnostics:  CT a/p today    Assessment/Plan:     * Colovesical fistula  Admitted from home with known colovesical fistula    -cont IV Zosyn  -monitor I&O  -monitor vs  -surgical planning    Severe malnutrition  Nutrition consulted. Most recent weight and BMI monitored-     Malnutrition Type and Energy Intake  Malnutrition Type: chronic illness    Malnutrition (Moderate to Severe)  Weight Loss (Malnutrition):  (14% x 3 months per chart review)  Energy Intake (Malnutrition): less than 75% for greater than or equal to 1 month (susected <75% x 3 months given wt loss)       PICC-TPN        Measurements:  Wt Readings from Last 1 Encounters:   04/20/22 53.1 kg (117 lb 1 oz)   Body mass index is  20.74 kg/m².    Recommendations: Recommendation/Intervention: 1.  Goals: receive nutrition by RD follow-up    Patient has been screened and assessed by RD. RD will follow patient.      Chronic combined systolic and diastolic congestive heart failure  Patient is identified as having Combined Systolic and Diastolic heart failure that is Chronic. CHF is currently controlled. Latest ECHO performed and demonstrates- Results for orders placed during the hospital encounter of 01/22/22    Echo Saline Bubble? No    Interpretation Summary  · The left ventricle is severely enlarged with mild eccentric hypertrophy and severely decreased systolic function.  · There are segmental left ventricular wall motion abnormalities. The mid anteroseptal and anterior walls are akinetic. The apex is akinetic. The remainder of the left ventricular walls are severely hypokinetic.  · The estimated ejection fraction is 20%.  · Spontaneous echocardiographic contrast in the left ventricle.  · Grade III left ventricular diastolic dysfunction.  · Severe left atrial enlargement.  · Normal right ventricular size with normal right ventricular systolic function.  · Mild right atrial enlargement.  · Mild aortic regurgitation.  · Mild-to-moderate mitral regurgitation.  · Mild tricuspid regurgitation.  · The estimated PA systolic pressure is 28 mmHg.  · Intermediate central venous pressure (8 mmHg).  · Pacemaker/ICD lead.  Cont home meds  Monitor weight and vs  ..      Primary hypertension  Chronic, controlled.  Latest blood pressure and vitals reviewed-   Temp:  [96.3 °F (35.7 °C)-97.9 °F (36.6 °C)]   Pulse:  [65-82]   Resp:  [16-22]   BP: ()/(52-55)   SpO2:  [92 %-100 %] .   Home meds for hypertension were reviewed and noted below.   Hypertension Medications             carvediloL (COREG) 12.5 MG tablet Take 1 tablet (12.5 mg total) by mouth 2 (two) times daily with meals.    nitroGLYCERIN (NITROSTAT) 0.4 MG SL tablet Place 1 tablet (0.4 mg total)  under the tongue every 5 (five) minutes as needed for Chest pain.    spironolactone (ALDACTONE) 25 MG tablet Take 1 tablet (25 mg total) by mouth once daily.          While in the hospital, will manage blood pressure as follows; Continue home antihypertensive regimen    Will utilize p.r.n. blood pressure medication only if patient's blood pressure greater than  180/110 and she develops symptoms such as worsening chest pain or shortness of breath.      Coronary artery disease of native artery of native heart with stable angina pectoris  Tele  Cont home meds  Monitor vs    Automatic implantable cardioverter-defibrillator in situ  Tele  Cont home meds  Monitor vs          Carola Buckley NP  Colorectal Surgery  [facility]

## 2022-04-21 NOTE — CONSULTS
Double lumen PICC to RIGHT BASILIC vein.  31cm in length, 26cm arm circumference and 0cm exposed.   Lot # [id number].

## 2022-04-21 NOTE — PROGRESS NOTES
"[facility]  Adult Nutrition  Progress Note    SUMMARY       Recommendations    1. Suggest Custom TPN 75 gm AA / 235 gm Dex + IV lipids to provide 1599 kcal, 75 gm protein, and GIR = 3.07 mg/kg/min.     2. As medically able, ADAT to low fiber/residue + Boost Plus TID with texture per SLP     3. RD following.     Goals: receive nutrition by RD follow-up  Nutrition Goal Status: new  Communication of RD Recs:  (POC)    Assessment and Plan    Nutrition Problem:  Severe Protein-Calorie Malnutrition  Malnutrition in the context of Chronic Illness/Injury    Related to (etiology):  Inadequate Energy Intake; Altered GI Function     Signs and Symptoms (as evidenced by):  Energy Intake: <75% of estimated energy requirement for 1 month   Body Fat Depletion: KEO  Muscle Mass Depletion: KEO  Weight Loss: 14% x 3 months per chart review      Interventions (treatment strategy):  Collaboration of nutrition care with other providers   Parenteral Nutrition     Nutrition Diagnosis Status:  New     Malnutrition Assessment  Malnutrition Type: chronic illness          Weight Loss (Malnutrition):  (14% x 3 months per chart review)  Energy Intake (Malnutrition): less than 75% for greater than or equal to 1 month (susected <75% x 3 months given wt loss)                         Reason for Assessment    Reason For Assessment: new TPN  Diagnosis:  (colovesical fistula)  Relevant Medical History: CAD s/p PCI, CHF, HTN, ICD, complicated diverticulitis with colovesical fistula  Interdisciplinary Rounds: did not attend  General Information Comments: Pt on clear liquid diet. PPN ordered and will begin tonight. PICC consult placed. Per H&P, pt with "poor PO tolerance, nausea, weight loss, and abdominal pain" before admit. Noted significant wt loss per chart review. Pt meets chronic severe malnutrition criteria with current information.   Nutrition Discharge Planning: pending medical course    Nutrition Risk Screen    Nutrition Risk Screen: no " "indicators present    Nutrition/Diet History    Factors Affecting Nutritional Intake: NPO, altered gastrointestinal function    Anthropometrics    Temp: 97.9 °F (36.6 °C)  Height: 5' 2.99" (160 cm)  Height (inches): 62.99 in  Weight Method: Bed Scale  Weight: 53.1 kg (117 lb 1 oz)  Weight (lb): 117.07 lb  Ideal Body Weight (IBW), Female: 114.95 lb  % Ideal Body Weight, Female (lb): 101.84 %  BMI (Calculated): 20.7  BMI Grade: 18.5-24.9 - normal  Weight Loss: unintentional  Usual Body Weight (UBW), k.8 kg (pee chart review 2022)  % Usual Body Weight: 86.1  % Weight Change From Usual Weight: -14.08 %     Lab/Procedures/Meds    Pertinent Labs Reviewed: reviewed  Pertinent Labs Comments: BUN 7  Pertinent Medications Reviewed: reviewed  Pertinent Medications Comments: IVF     Estimated/Assessed Needs    Weight Used For Calorie Calculations: 53.1 kg (117 lb 1 oz)  Energy Calorie Requirements (kcal): 1593 kcal/day  Energy Need Method: Kcal/kg (30)  Protein Requirements: 69-80 gm/day (1.3-1.5 gm/kg)  Weight Used For Protein Calculations: 53.1 kg (117 lb 1 oz)  Fluid Requirements (mL): 1 mL/kcal or per MD     RDA Method (mL): 1593     Nutrition Prescription Ordered    Current Diet Order: Clear Liquids  Nutrition Order Comments: PN ordered, will begin tonight  Current Nutrition Support Formula Ordered:  (Standard Day One PPN + IV lipids)    Evaluation of Received Nutrient/Fluid Intake    I/O: +884.3mL since admit   Comments: LBM   Tolerance:  (will monitor)  % Intake of Estimated Energy Needs: 0 - 25 %  % Meal Intake: 0 - 25 %    Nutrition Risk    Level of Risk/Frequency of Follow-up:  (f/u 1 x wk)     Monitor and Evaluation    Food and Nutrient Intake: energy intake  Food and Nutrient Adminstration: diet order, enteral and parenteral nutrition administration  Physical Activity and Function: nutrition-related ADLs and IADLs  Anthropometric Measurements: weight, weight change, body mass index  Biochemical Data, " Medical Tests and Procedures: electrolyte and renal panel, gastrointestinal profile, glucose/endocrine profile, inflammatory profile, lipid profile  Nutrition-Focused Physical Findings: overall appearance     Nutrition Follow-Up    RD Follow-up?: Yes

## 2022-04-21 NOTE — PROCEDURES
"Odette Hart is a 66 y.o. female patient.    Temp: 97.7 °F (36.5 °C) (04/21/22 1158)  Pulse: 73 (04/21/22 1158)  Resp: 18 (04/21/22 1158)  BP: 118/64 (04/21/22 1158)  SpO2: 95 % (04/21/22 1158)  Weight: 53.1 kg (117 lb 1 oz) (04/20/22 1908)  Height: 5' 2.99" (160 cm) (04/20/22 1908)    PICC  Date/Time: 4/21/2022 2:15 PM  Performed by: Mervat Soto RN  Assisting provider: Benigno Guallpa RN  Post-operative diagnosis: PICC  Consent Done: Yes  Time out: Immediately prior to procedure a time out was called to verify the correct patient, procedure, equipment, support staff and site/side marked as required  Indications: med administration and vascular access  Local anesthetic: lidocaine 1% without epinephrine  Anesthetic Total (mL): 3  Description of findings: PICC  Preparation: skin prepped with chlorhexidine (without alcohol)  Skin prep agent dried: skin prep agent completely dried prior to procedure  Sterile barriers: all five maximum sterile barriers used - cap, mask, sterile gown, sterile gloves, and large sterile sheet  Hand hygiene: hand hygiene performed prior to central venous catheter insertion  Location details: right basilic  Catheter type: double lumen  Catheter size: 5 Fr  Catheter Length: 31cm    Ultrasound guidance: yes  Vessel Caliber: medium and patent, compressibility normal  Needle advanced into vessel with real time Ultrasound guidance.  Guidewire confirmed in vessel.  Image recorded and saved.  Sterile sheath used.  Number of attempts: 1  Post-procedure: blood return through all ports, chlorhexidine patch and sterile dressing applied  Technical procedures used: 3CG  Specimens: No  Implants: No  Assessment: placement verified by x-ray  Complications: none          Name   4/21/2022    "

## 2022-04-21 NOTE — NURSING
Patient noted direct admit   assesssed and order implemented  Iv started to left ac 20 g       Abrasion noted to right elbow  Gomez  Stable   Educated on fall precautions

## 2022-04-21 NOTE — ASSESSMENT & PLAN NOTE
Patient is identified as having Combined Systolic and Diastolic heart failure that is Chronic. CHF is currently controlled. Latest ECHO performed and demonstrates- Results for orders placed during the hospital encounter of 01/22/22    Echo Saline Bubble? No    Interpretation Summary  · The left ventricle is severely enlarged with mild eccentric hypertrophy and severely decreased systolic function.  · There are segmental left ventricular wall motion abnormalities. The mid anteroseptal and anterior walls are akinetic. The apex is akinetic. The remainder of the left ventricular walls are severely hypokinetic.  · The estimated ejection fraction is 20%.  · Spontaneous echocardiographic contrast in the left ventricle.  · Grade III left ventricular diastolic dysfunction.  · Severe left atrial enlargement.  · Normal right ventricular size with normal right ventricular systolic function.  · Mild right atrial enlargement.  · Mild aortic regurgitation.  · Mild-to-moderate mitral regurgitation.  · Mild tricuspid regurgitation.  · The estimated PA systolic pressure is 28 mmHg.  · Intermediate central venous pressure (8 mmHg).  · Pacemaker/ICD lead.  Cont home meds  Monitor weight and vs  ..

## 2022-04-21 NOTE — ASSESSMENT & PLAN NOTE
Chronic, controlled.  Latest blood pressure and vitals reviewed-   Temp:  [96.3 °F (35.7 °C)-97.9 °F (36.6 °C)]   Pulse:  [65-82]   Resp:  [16-22]   BP: ()/(52-55)   SpO2:  [92 %-100 %] .   Home meds for hypertension were reviewed and noted below.   Hypertension Medications             carvediloL (COREG) 12.5 MG tablet Take 1 tablet (12.5 mg total) by mouth 2 (two) times daily with meals.    nitroGLYCERIN (NITROSTAT) 0.4 MG SL tablet Place 1 tablet (0.4 mg total) under the tongue every 5 (five) minutes as needed for Chest pain.    spironolactone (ALDACTONE) 25 MG tablet Take 1 tablet (25 mg total) by mouth once daily.          While in the hospital, will manage blood pressure as follows; Continue home antihypertensive regimen    Will utilize p.r.n. blood pressure medication only if patient's blood pressure greater than  180/110 and she develops symptoms such as worsening chest pain or shortness of breath.

## 2022-04-22 PROBLEM — Z91.89: Status: ACTIVE | Noted: 2022-04-22

## 2022-04-22 LAB
ALBUMIN SERPL BCP-MCNC: 2 G/DL (ref 3.5–5.2)
ALP SERPL-CCNC: 69 U/L (ref 55–135)
ALT SERPL W/O P-5'-P-CCNC: 42 U/L (ref 10–44)
ANION GAP SERPL CALC-SCNC: 6 MMOL/L (ref 8–16)
ANISOCYTOSIS BLD QL SMEAR: SLIGHT
AST SERPL-CCNC: 123 U/L (ref 10–40)
BASOPHILS # BLD AUTO: 0.02 K/UL (ref 0–0.2)
BASOPHILS NFR BLD: 0.6 % (ref 0–1.9)
BILIRUB SERPL-MCNC: 0.6 MG/DL (ref 0.1–1)
BUN SERPL-MCNC: 10 MG/DL (ref 8–23)
CALCIUM SERPL-MCNC: 7.8 MG/DL (ref 8.7–10.5)
CHLORIDE SERPL-SCNC: 101 MMOL/L (ref 95–110)
CO2 SERPL-SCNC: 25 MMOL/L (ref 23–29)
CREAT SERPL-MCNC: 0.8 MG/DL (ref 0.5–1.4)
DACRYOCYTES BLD QL SMEAR: ABNORMAL
DIFFERENTIAL METHOD: ABNORMAL
EOSINOPHIL # BLD AUTO: 0 K/UL (ref 0–0.5)
EOSINOPHIL NFR BLD: 0 % (ref 0–8)
ERYTHROCYTE [DISTWIDTH] IN BLOOD BY AUTOMATED COUNT: 21.1 % (ref 11.5–14.5)
EST. GFR  (AFRICAN AMERICAN): >60 ML/MIN/1.73 M^2
EST. GFR  (NON AFRICAN AMERICAN): >60 ML/MIN/1.73 M^2
GLUCOSE SERPL-MCNC: 168 MG/DL (ref 70–110)
HCT VFR BLD AUTO: 34.5 % (ref 37–48.5)
HGB BLD-MCNC: 11.2 G/DL (ref 12–16)
HYPOCHROMIA BLD QL SMEAR: ABNORMAL
IMM GRANULOCYTES # BLD AUTO: 0.02 K/UL (ref 0–0.04)
IMM GRANULOCYTES NFR BLD AUTO: 0.6 % (ref 0–0.5)
LYMPHOCYTES # BLD AUTO: 1.6 K/UL (ref 1–4.8)
LYMPHOCYTES NFR BLD: 49.8 % (ref 18–48)
MAGNESIUM SERPL-MCNC: 2 MG/DL (ref 1.6–2.6)
MCH RBC QN AUTO: 26.9 PG (ref 27–31)
MCHC RBC AUTO-ENTMCNC: 32.5 G/DL (ref 32–36)
MCV RBC AUTO: 83 FL (ref 82–98)
MONOCYTES # BLD AUTO: 0.5 K/UL (ref 0.3–1)
MONOCYTES NFR BLD: 14.8 % (ref 4–15)
NEUTROPHILS # BLD AUTO: 1.1 K/UL (ref 1.8–7.7)
NEUTROPHILS NFR BLD: 34.2 % (ref 38–73)
NRBC BLD-RTO: 0 /100 WBC
OVALOCYTES BLD QL SMEAR: ABNORMAL
PHOSPHATE SERPL-MCNC: 2.9 MG/DL (ref 2.7–4.5)
PLATELET # BLD AUTO: 239 K/UL (ref 150–450)
PLATELET BLD QL SMEAR: ABNORMAL
PMV BLD AUTO: 10.8 FL (ref 9.2–12.9)
POCT GLUCOSE: 113 MG/DL (ref 70–110)
POCT GLUCOSE: 124 MG/DL (ref 70–110)
POCT GLUCOSE: 129 MG/DL (ref 70–110)
POCT GLUCOSE: 147 MG/DL (ref 70–110)
POCT GLUCOSE: 176 MG/DL (ref 70–110)
POCT GLUCOSE: 191 MG/DL (ref 70–110)
POIKILOCYTOSIS BLD QL SMEAR: SLIGHT
POLYCHROMASIA BLD QL SMEAR: ABNORMAL
POTASSIUM SERPL-SCNC: 3.9 MMOL/L (ref 3.5–5.1)
PREALB SERPL-MCNC: 7 MG/DL (ref 20–43)
PROT SERPL-MCNC: 6.6 G/DL (ref 6–8.4)
RBC # BLD AUTO: 4.17 M/UL (ref 4–5.4)
SMUDGE CELLS BLD QL SMEAR: PRESENT
SODIUM SERPL-SCNC: 132 MMOL/L (ref 136–145)
TARGETS BLD QL SMEAR: ABNORMAL
TOXIC GRANULES BLD QL SMEAR: PRESENT
TRIGL SERPL-MCNC: 148 MG/DL (ref 30–150)
WBC # BLD AUTO: 3.17 K/UL (ref 3.9–12.7)

## 2022-04-22 PROCEDURE — 63600175 PHARM REV CODE 636 W HCPCS: Performed by: COLON & RECTAL SURGERY

## 2022-04-22 PROCEDURE — A4216 STERILE WATER/SALINE, 10 ML: HCPCS | Performed by: COLON & RECTAL SURGERY

## 2022-04-22 PROCEDURE — 85025 COMPLETE CBC W/AUTO DIFF WBC: CPT | Performed by: STUDENT IN AN ORGANIZED HEALTH CARE EDUCATION/TRAINING PROGRAM

## 2022-04-22 PROCEDURE — 84100 ASSAY OF PHOSPHORUS: CPT | Performed by: STUDENT IN AN ORGANIZED HEALTH CARE EDUCATION/TRAINING PROGRAM

## 2022-04-22 PROCEDURE — 63600175 PHARM REV CODE 636 W HCPCS: Performed by: STUDENT IN AN ORGANIZED HEALTH CARE EDUCATION/TRAINING PROGRAM

## 2022-04-22 PROCEDURE — 84478 ASSAY OF TRIGLYCERIDES: CPT | Performed by: STUDENT IN AN ORGANIZED HEALTH CARE EDUCATION/TRAINING PROGRAM

## 2022-04-22 PROCEDURE — B4185 PARENTERAL SOL 10 GM LIPIDS: HCPCS | Performed by: STUDENT IN AN ORGANIZED HEALTH CARE EDUCATION/TRAINING PROGRAM

## 2022-04-22 PROCEDURE — 83735 ASSAY OF MAGNESIUM: CPT | Performed by: STUDENT IN AN ORGANIZED HEALTH CARE EDUCATION/TRAINING PROGRAM

## 2022-04-22 PROCEDURE — 20600001 HC STEP DOWN PRIVATE ROOM

## 2022-04-22 PROCEDURE — 99223 1ST HOSP IP/OBS HIGH 75: CPT | Mod: GC,,, | Performed by: INTERNAL MEDICINE

## 2022-04-22 PROCEDURE — 80053 COMPREHEN METABOLIC PANEL: CPT | Performed by: STUDENT IN AN ORGANIZED HEALTH CARE EDUCATION/TRAINING PROGRAM

## 2022-04-22 PROCEDURE — 84134 ASSAY OF PREALBUMIN: CPT | Performed by: STUDENT IN AN ORGANIZED HEALTH CARE EDUCATION/TRAINING PROGRAM

## 2022-04-22 PROCEDURE — 25000003 PHARM REV CODE 250: Performed by: STUDENT IN AN ORGANIZED HEALTH CARE EDUCATION/TRAINING PROGRAM

## 2022-04-22 PROCEDURE — 36415 COLL VENOUS BLD VENIPUNCTURE: CPT | Performed by: STUDENT IN AN ORGANIZED HEALTH CARE EDUCATION/TRAINING PROGRAM

## 2022-04-22 PROCEDURE — 99223 PR INITIAL HOSPITAL CARE,LEVL III: ICD-10-PCS | Mod: GC,,, | Performed by: INTERNAL MEDICINE

## 2022-04-22 PROCEDURE — 25000003 PHARM REV CODE 250: Performed by: COLON & RECTAL SURGERY

## 2022-04-22 PROCEDURE — A4217 STERILE WATER/SALINE, 500 ML: HCPCS | Performed by: COLON & RECTAL SURGERY

## 2022-04-22 PROCEDURE — 94760 N-INVAS EAR/PLS OXIMETRY 1: CPT

## 2022-04-22 RX ORDER — ASPIRIN 81 MG/1
81 TABLET ORAL DAILY
Status: DISCONTINUED | OUTPATIENT
Start: 2022-04-22 | End: 2022-04-27 | Stop reason: HOSPADM

## 2022-04-22 RX ORDER — FUROSEMIDE 10 MG/ML
40 INJECTION INTRAMUSCULAR; INTRAVENOUS
Status: DISPENSED | OUTPATIENT
Start: 2022-04-22 | End: 2022-04-25

## 2022-04-22 RX ORDER — ALPRAZOLAM 0.5 MG/1
0.5 TABLET ORAL NIGHTLY PRN
Status: DISCONTINUED | OUTPATIENT
Start: 2022-04-22 | End: 2022-04-27 | Stop reason: HOSPADM

## 2022-04-22 RX ORDER — CARVEDILOL 12.5 MG/1
12.5 TABLET ORAL 2 TIMES DAILY WITH MEALS
Status: DISCONTINUED | OUTPATIENT
Start: 2022-04-22 | End: 2022-04-27 | Stop reason: HOSPADM

## 2022-04-22 RX ORDER — ALBUTEROL SULFATE 90 UG/1
2 AEROSOL, METERED RESPIRATORY (INHALATION) EVERY 6 HOURS PRN
Status: DISCONTINUED | OUTPATIENT
Start: 2022-04-22 | End: 2022-04-27 | Stop reason: HOSPADM

## 2022-04-22 RX ORDER — PANTOPRAZOLE SODIUM 40 MG/1
40 TABLET, DELAYED RELEASE ORAL DAILY
Status: DISCONTINUED | OUTPATIENT
Start: 2022-04-22 | End: 2022-04-27 | Stop reason: HOSPADM

## 2022-04-22 RX ORDER — PRASUGREL 10 MG/1
10 TABLET, FILM COATED ORAL DAILY
Status: DISCONTINUED | OUTPATIENT
Start: 2022-04-22 | End: 2022-04-27 | Stop reason: HOSPADM

## 2022-04-22 RX ORDER — DICYCLOMINE HYDROCHLORIDE 10 MG/1
10 CAPSULE ORAL 4 TIMES DAILY PRN
Status: DISCONTINUED | OUTPATIENT
Start: 2022-04-22 | End: 2022-04-27 | Stop reason: HOSPADM

## 2022-04-22 RX ADMIN — PIPERACILLIN SODIUM AND TAZOBACTAM SODIUM 4.5 G: 4; .5 INJECTION, POWDER, FOR SOLUTION INTRAVENOUS at 04:04

## 2022-04-22 RX ADMIN — FUROSEMIDE 40 MG: 10 INJECTION, SOLUTION INTRAMUSCULAR; INTRAVENOUS at 09:04

## 2022-04-22 RX ADMIN — Medication 10 ML: at 12:04

## 2022-04-22 RX ADMIN — PIPERACILLIN SODIUM AND TAZOBACTAM SODIUM 4.5 G: 4; .5 INJECTION, POWDER, FOR SOLUTION INTRAVENOUS at 08:04

## 2022-04-22 RX ADMIN — Medication 10 ML: at 06:04

## 2022-04-22 RX ADMIN — PRASUGREL 10 MG: 10 TABLET, FILM COATED ORAL at 10:04

## 2022-04-22 RX ADMIN — PANTOPRAZOLE SODIUM 40 MG: 40 TABLET, DELAYED RELEASE ORAL at 09:04

## 2022-04-22 RX ADMIN — I.V. FAT EMULSION 250 ML: 20 EMULSION INTRAVENOUS at 09:04

## 2022-04-22 RX ADMIN — CARVEDILOL 12.5 MG: 12.5 TABLET, FILM COATED ORAL at 04:04

## 2022-04-22 RX ADMIN — ASPIRIN 81 MG: 81 TABLET, COATED ORAL at 09:04

## 2022-04-22 RX ADMIN — PIPERACILLIN SODIUM AND TAZOBACTAM SODIUM 4.5 G: 4; .5 INJECTION, POWDER, FOR SOLUTION INTRAVENOUS at 11:04

## 2022-04-22 RX ADMIN — PROCHLORPERAZINE EDISYLATE 5 MG: 5 INJECTION INTRAMUSCULAR; INTRAVENOUS at 04:04

## 2022-04-22 RX ADMIN — CARVEDILOL 12.5 MG: 12.5 TABLET, FILM COATED ORAL at 09:04

## 2022-04-22 RX ADMIN — MAGNESIUM SULFATE HEPTAHYDRATE: 500 INJECTION, SOLUTION INTRAMUSCULAR; INTRAVENOUS at 09:04

## 2022-04-22 RX ADMIN — Medication 10 ML: at 11:04

## 2022-04-22 NOTE — HPI
66F with complicated cardiac history including CAD s/p PCI (1/24/22) on dual antiplatelet therapy, CHF (EF 20%), HTN, ICD, and complicated diverticulitis with colovesical fistula. She describes fecaluria which is getting progressively worse. She also notes night sweats, poor PO tolerance, nausea, weight loss, and abdominal pain. She tries to take her medication but often feels like things are stuck in her throat and and vomits the pills up. She has not been taking her prescribed antibiotics. She overall feels weak and like she is having trouble managing her health conditions. She denies CP or SOB. She has no new LLQ abdominal pain. She has never had a colonoscopy as she was inpatient at the time her previous scope was scheduled.       Per last Cards visit 4/4 Dr. Arenas     CHF is adequately compensated.     Severe ischemic cardiomyopathy is stable.     Pt may have thrush from prolonged antibiotics.     Same meds      Pt has an appt 4/7/22 with GI Dr Sotelo.     Could colonoscopy be performed while holding the Effient but continuing the ASA?     Could partial colectomy and colovesicular fistula repair be performed after holding Effient for a week but while still taking ASA 81 mg daily?     The risk of stent thrombosis from holding Effient and especially from holding both Effient and ASA discussed with pt.     The ongoing risk of sepsis from delaying colon surgery discussed.     It may be safer to proceed with surgery.     Pt is at increased risk of heart attack and CHF from surgery.     Pt passes flatus and feces from her bladder daily.  Pt states she also passes feces from her vagina.     Follow up in about 3 months (around 7/4/2022).    EKG 4/4  Vent. Rate : 083 BPM     Atrial Rate : 083 BPM      P-R Int : 124 ms          QRS Dur : 134 ms       QT Int : 374 ms       P-R-T Axes : 056 -63 089 degrees      QTc Int : 439 ms     Normal sinus rhythm   Right bundle branch block   Left anterior fascicular block     Bifascicular block   Anterolateral infarct (cited on or before 16-JAN-2019)   Echo 1/24  The left ventricle is severely enlarged with mild eccentric hypertrophy and severely decreased systolic function.  There are segmental left ventricular wall motion abnormalities. The mid anteroseptal and anterior walls are akinetic. The apex is akinetic. The remainder of the left ventricular walls are severely hypokinetic.  The estimated ejection fraction is 20%.  Spontaneous echocardiographic contrast in the left ventricle.  Grade III left ventricular diastolic dysfunction.  Severe left atrial enlargement.  Normal right ventricular size with normal right ventricular systolic function.  Mild right atrial enlargement.  Mild aortic regurgitation.  Mild-to-moderate mitral regurgitation.  Mild tricuspid regurgitation.  The estimated PA systolic pressure is 28 mmHg.  Intermediate central venous pressure (8 mmHg).  Pacemaker/ICD lead.  Cath 1/24  The left ventricular end diastolic pressure was elevated.  The pre-procedure left ventricular end diastolic pressure was 20.  The Mid LAD lesion was 70% stenosed.  The Prox Cx lesion was 80% stenosed with 0% stenosis post-intervention.  The Mid Cx lesion was 90% stenosed with 0% stenosis post-intervention.  The Prox RCA lesion was 50% stenosed.  The RPDA lesion was 70% stenosed.  The RPAV lesion was 60% stenosed.  A STENT RESOLUTE AMBER [id number] stent was successfully placed at 16 PJ for 10 sec.  A STENT RESOLUTE AMBER [id number] stent was successfully placed at 12 PJ for 15 sec.  The estimated blood loss was <50 mL.  There was three vessel coronary artery disease.  The PCI was successful.     Latest Reference Range & Units 04/21/22 06:29   WBC 3.90 - 12.70 K/uL 3.08 (L)   RBC 4.00 - 5.40 M/uL 4.01   Hemoglobin 12.0 - 16.0 g/dL 10.6 (L)   Hematocrit 37.0 - 48.5 % 33.6 (L)   MCV 82 - 98 fL 84      Latest Reference Range & Units 04/21/22 06:29   Sodium 136 - 145 mmol/L 136   Potassium 3.5 - 5.1  mmol/L 3.9   Chloride 95 - 110 mmol/L 102   CO2 23 - 29 mmol/L 25   Anion Gap 8 - 16 mmol/L 9   BUN 8 - 23 mg/dL 7 (L)   Creatinine 0.5 - 1.4 mg/dL 0.7

## 2022-04-22 NOTE — ASSESSMENT & PLAN NOTE
Nutrition consulted. Most recent weight and BMI monitored-     Malnutrition Type and Energy Intake  Malnutrition Type: chronic illness    Malnutrition (Moderate to Severe)  Weight Loss (Malnutrition):  (14% x 3 months per chart review)  Energy Intake (Malnutrition): less than 75% for greater than or equal to 1 month (susected <75% x 3 months given wt loss)     The patient is currently receiving TPN  K>4 Mg>2              Measurements:  Wt Readings from Last 1 Encounters:   04/20/22 53.1 kg (117 lb 1 oz)   Body mass index is 20.74 kg/m².    Recommendations: Recommendation/Intervention: 1.  Goals: receive nutrition by RD follow-up    Patient has been screened and assessed by RD. RD will follow patient.

## 2022-04-22 NOTE — ASSESSMENT & PLAN NOTE
Latest Reference Range & Units 04/21/22 06:29   WBC 3.90 - 12.70 K/uL 3.08 (L)   RBC 4.00 - 5.40 M/uL 4.01   Hemoglobin 12.0 - 16.0 g/dL 10.6 (L)   Hematocrit 37.0 - 48.5 % 33.6 (L)   MCV 82 - 98 fL 84

## 2022-04-22 NOTE — ASSESSMENT & PLAN NOTE
-Patient with AICD, appears to be functioning well    -Will interrogate at bedside prior to and after procedure  -Continue to monitor on Continuous Telemetry

## 2022-04-22 NOTE — CONSULTS
[facility]  Cardiology  Consult Note    Patient Name: Odette Hart  MRN: [id number]  Admission Date: 4/20/2022  Hospital Length of Stay: 2 days  Code Status: Full Code   Attending Provider: Joseph Potter MD   Consulting Provider: Balbir Zhong MD  Primary Care Physician: Braxton Barragan MD  Principal Problem:Colovesical fistula    Patient information was obtained from patient and ER records.     Consults  Subjective:     Chief Complaint:  Surgical Risk Stratification      HPI:   66F with complicated cardiac history including CAD s/p PCI (1/24/22) on dual antiplatelet therapy, CHF (EF 20%), HTN, ICD, and complicated diverticulitis with colovesical fistula. She describes fecaluria which is getting progressively worse. She also notes night sweats, poor PO tolerance, nausea, weight loss, and abdominal pain. She tries to take her medication but often feels like things are stuck in her throat and and vomits the pills up. She has not been taking her prescribed antibiotics. She overall feels weak and like she is having trouble managing her health conditions. She denies CP or SOB. She has no new LLQ abdominal pain. She has never had a colonoscopy as she was inpatient at the time her previous scope was scheduled.       Per last Cards visit 4/4 Dr. Arenas     CHF is adequately compensated.     Severe ischemic cardiomyopathy is stable.     Pt may have thrush from prolonged antibiotics.     Same meds      Pt has an appt 4/7/22 with GI Dr Sotelo.     Could colonoscopy be performed while holding the Effient but continuing the ASA?     Could partial colectomy and colovesicular fistula repair be performed after holding Effient for a week but while still taking ASA 81 mg daily?     The risk of stent thrombosis from holding Effient and especially from holding both Effient and ASA discussed with pt.     The ongoing risk of sepsis from delaying colon surgery discussed.     It may be safer to proceed with surgery.      Pt is at increased risk of heart attack and CHF from surgery.     Pt passes flatus and feces from her bladder daily.  Pt states she also passes feces from her vagina.     Follow up in about 3 months (around 7/4/2022).    EKG 4/4  Vent. Rate : 083 BPM     Atrial Rate : 083 BPM      P-R Int : 124 ms          QRS Dur : 134 ms       QT Int : 374 ms       P-R-T Axes : 056 -63 089 degrees      QTc Int : 439 ms     Normal sinus rhythm   Right bundle branch block   Left anterior fascicular block    Bifascicular block   Anterolateral infarct (cited on or before 16-JAN-2019)   Echo 1/24  · The left ventricle is severely enlarged with mild eccentric hypertrophy and severely decreased systolic function.  · There are segmental left ventricular wall motion abnormalities. The mid anteroseptal and anterior walls are akinetic. The apex is akinetic. The remainder of the left ventricular walls are severely hypokinetic.  · The estimated ejection fraction is 20%.  · Spontaneous echocardiographic contrast in the left ventricle.  · Grade III left ventricular diastolic dysfunction.  · Severe left atrial enlargement.  · Normal right ventricular size with normal right ventricular systolic function.  · Mild right atrial enlargement.  · Mild aortic regurgitation.  · Mild-to-moderate mitral regurgitation.  · Mild tricuspid regurgitation.  · The estimated PA systolic pressure is 28 mmHg.  · Intermediate central venous pressure (8 mmHg).  · Pacemaker/ICD lead.  Cath 1/24  · The left ventricular end diastolic pressure was elevated.  · The pre-procedure left ventricular end diastolic pressure was 20.  · The Mid LAD lesion was 70% stenosed.  · The Prox Cx lesion was 80% stenosed with 0% stenosis post-intervention.  · The Mid Cx lesion was 90% stenosed with 0% stenosis post-intervention.  · The Prox RCA lesion was 50% stenosed.  · The RPDA lesion was 70% stenosed.  · The RPAV lesion was 60% stenosed.  · A STENT RESOLUTE AMBER [id number] stent was  successfully placed at 16 PJ for 10 sec.  · A STENT RESOLUTE AMBER [id number] stent was successfully placed at 12 PJ for 15 sec.  · The estimated blood loss was <50 mL.  · There was three vessel coronary artery disease.  · The PCI was successful.     Latest Reference Range & Units 22 06:29   WBC 3.90 - 12.70 K/uL 3.08 (L)   RBC 4.00 - 5.40 M/uL 4.01   Hemoglobin 12.0 - 16.0 g/dL 10.6 (L)   Hematocrit 37.0 - 48.5 % 33.6 (L)   MCV 82 - 98 fL 84      Latest Reference Range & Units 22 06:29   Sodium 136 - 145 mmol/L 136   Potassium 3.5 - 5.1 mmol/L 3.9   Chloride 95 - 110 mmol/L 102   CO2 23 - 29 mmol/L 25   Anion Gap 8 - 16 mmol/L 9   BUN 8 - 23 mg/dL 7 (L)   Creatinine 0.5 - 1.4 mg/dL 0.7           Past Medical History:   Diagnosis Date    Acute coronary syndrome     Allergy     Arthritis     Cardiomyopathy     CHF (congestive heart failure)     Coronary artery disease     Coronary artery disease of native artery of native heart with stable angina pectoris 2021: [facility]: Cath: LAD: Proximal stent patent. LCX: Proximal 80%. LCX: Dominant. Moderate disease. LCX: HEVER 2.75 x 18 mm. Distal dissection. HEVER 2.75 x 22 mm.     Diverticulitis     Diverticulosis     Familial hypercholesterolemia 2022    Former smoker 2022    Heart attack     Heart disease     History of myocardial infarction 2022    Hyperlipidemia     Hypertension     Hypertension 2022    ICD (implantable cardioverter-defibrillator) in place     Non-ST elevation myocardial infarction (NSTEMI) 2019       Past Surgical History:   Procedure Laterality Date    ATRIAL CARDIAC PACEMAKER INSERTION       SECTION      CORONARY STENT PLACEMENT      LEFT HEART CATHETERIZATION Left 2022    Procedure: CATHETERIZATION, HEART, LEFT;  Surgeon: Yohannes Cordova MD;  Location[facility] C[facility] LAB;  Service: Cardiology;  Laterality: Left;       Review of patient's allergies indicates:   Allergen  Reactions    Augmentin [amoxicillin-pot clavulanate] Swelling     Not allergic to amoxicillin just a derivative of augmentin    Lisinopril Other (See Comments)     Fluid around heart    Losartan Other (See Comments)     High potassium    Shellfish containing products Anaphylaxis     Pt.states she is allergic to SEAFOOD since the age of 13    Levaquin [levofloxacin] Other (See Comments)     Very bad joint pain    Clindamycin Palpitations     Chest pain       No current facility-administered medications on file prior to encounter.     Current Outpatient Medications on File Prior to Encounter   Medication Sig    acetaminophen (TYLENOL) 500 MG tablet Take 2 tablets (1,000 mg total) by mouth every 8 (eight) hours as needed for Pain.    albuterol (PROVENTIL/VENTOLIN HFA) 90 mcg/actuation inhaler Inhale 2 puffs into the lungs every 6 (six) hours as needed for Wheezing. Rescue    ALPRAZolam (XANAX) 0.5 MG tablet Take 1 tablet (0.5 mg total) by mouth nightly as needed for Anxiety.    aspirin (ECOTRIN) 81 MG EC tablet Take 1 tablet (81 mg total) by mouth once daily.    carvediloL (COREG) 12.5 MG tablet Take 1 tablet (12.5 mg total) by mouth 2 (two) times daily with meals.    cefdinir (OMNICEF) 125 mg/5 mL suspension Take 12 mLs (300 mg total) by mouth every 12 (twelve) hours.    dicyclomine (BENTYL) 10 MG capsule Take 1 capsule (10 mg total) by mouth 4 (four) times daily as needed.    ezetimibe (ZETIA) 10 mg tablet Take 1 tablet (10 mg total) by mouth every evening.    fluticasone propionate (FLONASE) 50 mcg/actuation nasal spray 1 spray (50 mcg total) by Each Nostril route once daily.    Lactobacillus rhamnosus GG (CULTURELLE) 10 billion cell capsule Take 1 capsule by mouth once daily.    metronidazole (FLAGYL) 50 mg/mL Syrg Take 10 mLs (500 mg total) by mouth every 8 (eight) hours.    nitroGLYCERIN (NITROSTAT) 0.4 MG SL tablet Place 1 tablet (0.4 mg total) under the tongue every 5 (five) minutes as needed  for Chest pain. (Patient not taking: Reported on 2022)    omeprazole (PRILOSEC) 40 MG capsule Take 1 capsule (40 mg total) by mouth once daily.    prasugreL (EFFIENT) 10 mg Tab Take 1 tablet (10 mg total) by mouth once daily.    promethazine (PROMETHEGAN) 25 MG suppository Place 1 suppository (25 mg total) rectally every 6 (six) hours as needed for Nausea.    simethicone (MYLICON) 80 MG chewable tablet Take 80 mg by mouth every 8 (eight) hours as needed.    spironolactone (ALDACTONE) 25 MG tablet Take 1 tablet (25 mg total) by mouth once daily.     Family History       Problem Relation (Age of Onset)    Emphysema Father    Heart attack Brother    Heart disease Mother          Tobacco Use    Smoking status: Former Smoker     Packs/day: 0.50     Start date: 1976     Quit date: 2012     Years since quittin.3    Smokeless tobacco: Never Used   Substance and Sexual Activity    Alcohol use: No    Drug use: No    Sexual activity: Not on file     Review of Systems   Constitutional: Positive for chills.   HENT:  Negative for congestion.    Eyes:  Negative for blurred vision.   Cardiovascular:  Positive for dyspnea on exertion, leg swelling and orthopnea.   Respiratory:  Positive for shortness of breath. Negative for cough and hemoptysis.    Genitourinary:         Feces and air in urine   Neurological:  Positive for weakness. Negative for focal weakness.   Psychiatric/Behavioral:  Negative for altered mental status and depression.    Objective:     Vital Signs (Most Recent):  Temp: 98.5 °F (36.9 °C) (22 1111)  Pulse: 80 (22 1112)  Resp: 18 (22 1111)  BP: 105/70 (22 1111)  SpO2: (!) 92 % (22 1111)   Vital Signs (24h Range):  Temp:  [97.5 °F (36.4 °C)-99 °F (37.2 °C)] 98.5 °F (36.9 °C)  Pulse:  [] 80  Resp:  [16-22] 18  SpO2:  [92 %-95 %] 92 %  BP: (105-152)/(56-76) 105/70     Weight: 53.1 kg (117 lb 1 oz)  Body mass index is 20.74 kg/m².    SpO2: (!) 92 %       No  intake or output data in the 24 hours ending 04/22/22 1324    Lines/Drains/Airways       Peripherally Inserted Central Catheter Line  Duration             PICC Double Lumen 04/21/22 1424 right basilic <1 day              Peripheral Intravenous Line  Duration                  Peripheral IV - Single Lumen 04/20/22 1645 20 G Left Antecubital 1 day                    Physical Exam  Vitals and nursing note reviewed.   Constitutional:       Appearance: She is ill-appearing.   HENT:      Head: Normocephalic and atraumatic.      Right Ear: External ear normal.      Left Ear: External ear normal.      Nose: Nose normal.      Mouth/Throat:      Pharynx: Oropharynx is clear.   Eyes:      Conjunctiva/sclera: Conjunctivae normal.   Neck:      Comments: +JVD  Cardiovascular:      Rate and Rhythm: Normal rate and regular rhythm.      Pulses: Normal pulses.      Heart sounds: Normal heart sounds.   Pulmonary:      Effort: No respiratory distress.      Breath sounds: No rales.   Abdominal:      General: Abdomen is flat.      Palpations: Abdomen is soft.   Musculoskeletal:         General: Swelling present.      Right lower leg: Edema present.      Left lower leg: Edema present.   Skin:     General: Skin is warm.      Coloration: Skin is not pale.   Neurological:      General: No focal deficit present.      Mental Status: She is alert and oriented to person, place, and time.   Psychiatric:         Mood and Affect: Mood normal.         Behavior: Behavior normal.       Significant Labs: All pertinent lab results from the last 24 hours have been reviewed.    Significant Imaging: EKG: reviewed    Assessment and Plan:     At risk for surgical complication  Patient is currently short of breath. Unable to perform ADLs currently without becoming short of breath. Reports at baseline is able to walk to mailbox    RCRI class IV indicating a 15 % chance of 30-day risk of death, MI, or cardiac arrest  Would recommend diuresis prior to major  operation     Severe malnutrition  Nutrition consulted. Most recent weight and BMI monitored-     Malnutrition Type and Energy Intake  Malnutrition Type: chronic illness    Malnutrition (Moderate to Severe)  Weight Loss (Malnutrition):  (14% x 3 months per chart review)  Energy Intake (Malnutrition): less than 75% for greater than or equal to 1 month (susected <75% x 3 months given wt loss)     The patient is currently receiving TPN  K>4 Mg>2              Measurements:  Wt Readings from Last 1 Encounters:   04/20/22 53.1 kg (117 lb 1 oz)   Body mass index is 20.74 kg/m².    Recommendations: Recommendation/Intervention: 1.  Goals: receive nutrition by RD follow-up    Patient has been screened and assessed by RD. RD will follow patient.      Chronic combined systolic and diastolic congestive heart failure  Patient is identified as having Combined Systolic and Diastolic heart failure that is Acute on chronic. CHF is currently controlled. Latest ECHO performed and demonstrates- Results for orders placed during the hospital encounter of 01/22/22    The patient clinically appears to be volume overloaded as evidenced by rales, JVD, peripheral edema, and shortness of breath   Echo Saline Bubble? No    Interpretation Summary  · The left ventricle is severely enlarged with mild eccentric hypertrophy and severely decreased systolic function.  · There are segmental left ventricular wall motion abnormalities. The mid anteroseptal and anterior walls are akinetic. The apex is akinetic. The remainder of the left ventricular walls are severely hypokinetic.  · The estimated ejection fraction is 20%.  · Spontaneous echocardiographic contrast in the left ventricle.  · Grade III left ventricular diastolic dysfunction.  · Severe left atrial enlargement.  · Normal right ventricular size with normal right ventricular systolic function.  · Mild right atrial enlargement.  · Mild aortic regurgitation.  · Mild-to-moderate mitral  regurgitation.  · Mild tricuspid regurgitation.  · The estimated PA systolic pressure is 28 mmHg.  · Intermediate central venous pressure (8 mmHg).  · Pacemaker/ICD lead.  . Continue Beta Blocker and monitor clinical status closely. Monitor on telemetry. Patient is off CHF pathway.  Monitor strict Is&Os and daily weights.  Place on fluid restriction of 1.5 L. Continue to stress to patient importance of self efficacy and  on diet for CHF. Last BNP reviewed- and noted below No results for input(s): BNP, BNPTRIAGEBLO in the last 168 hours..    Recommend Lasix 40 IV BID until the patient is euvolemic  Strict IS and Os  Restrict fluid volume  Continue home dose carvedilol      Former smoker  Will continue to  on continued abstinence    Primary hypertension  Continue Carvedilol 12.5 BID    Normocytic anemia   Latest Reference Range & Units 04/21/22 06:29   WBC 3.90 - 12.70 K/uL 3.08 (L)   RBC 4.00 - 5.40 M/uL 4.01   Hemoglobin 12.0 - 16.0 g/dL 10.6 (L)   Hematocrit 37.0 - 48.5 % 33.6 (L)   MCV 82 - 98 fL 84       Familial hypercholesterolemia  Last lipids   Cholesterol 150 HDL 29 LDL 93.6    Coronary artery disease of native artery of native heart with stable angina pectoris  Last cath 1/24/2022: [facility]: Cath: LAD: Proximal stent patent. LCX: Proximal 80%. LCX: Dominant. Moderate disease. LCX: HEVER 2.75 x 18 mm. Distal dissection. HEVER 2.75 x 22 mm.    -Continue Prasugruel 10  -Continue ASA 81  -If surgery indicated, OK to hold the DAPT, restart immediately when cleared by surgery team     RBBB with left anterior fascicular block  Recent EKG reviewed    -Repeat EKG    History of coronary artery stent placement  See   View All Notes    Coronary artery disease of native artery of native heart with stable angina pectoris               Automatic implantable cardioverter-defibrillator in situ  -Patient with AICD, appears to be functioning well    -Will interrogate at bedside prior to and after procedure  -Continue  to monitor on Continuous Telemetry     Ischemic cardiomyopathy  Last echo reviewed        VTE Risk Mitigation (From admission, onward)         Ordered     enoxaparin injection 40 mg  Daily         04/20/22 1619     IP VTE HIGH RISK PATIENT  Once         04/20/22 1619     Place sequential compression device  Until discontinued         04/20/22 1619                Thank you for your consult. I will sign off. Please contact us if you have any additional questions.    Balbir Zhong MD  Cardiology   [facility]

## 2022-04-22 NOTE — ASSESSMENT & PLAN NOTE
Last cath 1/24/2022: [facility]: Cath: LAD: Proximal stent patent. LCX: Proximal 80%. LCX: Dominant. Moderate disease. LCX: HEVER 2.75 x 18 mm. Distal dissection. HEVER 2.75 x 22 mm.    -Continue Prasugruel 10  -Continue ASA 81  -If surgery indicated, OK to hold the DAPT, restart immediately when cleared by surgery team

## 2022-04-22 NOTE — ASSESSMENT & PLAN NOTE
Nutrition consulted. Most recent weight and BMI monitored-     Malnutrition Type and Energy Intake  Malnutrition Type: chronic illness    Malnutrition (Moderate to Severe)  Weight Loss (Malnutrition):  (14% x 3 months per chart review)  Energy Intake (Malnutrition): less than 75% for greater than or equal to 1 month (susected <75% x 3 months given wt loss)       PICC-TPN        Measurements:  Wt Readings from Last 1 Encounters:   04/20/22 53.1 kg (117 lb 1 oz)   Body mass index is 20.74 kg/m².    Recommendations: Recommendation/Intervention: 1.  Goals: receive nutrition by RD follow-up    Patient has been screened and assessed by RD. RD will follow patient.    TPN/lipids concentrated and running at low rate given malnutrition/CHF

## 2022-04-22 NOTE — SUBJECTIVE & OBJECTIVE
Past Medical History:   Diagnosis Date    Acute coronary syndrome     Allergy     Arthritis     Cardiomyopathy     CHF (congestive heart failure)     Coronary artery disease     Coronary artery disease of native artery of native heart with stable angina pectoris 2021: [facility]: Cath: LAD: Proximal stent patent. LCX: Proximal 80%. LCX: Dominant. Moderate disease. LCX: HEVER 2.75 x 18 mm. Distal dissection. HEVER 2.75 x 22 mm.     Diverticulitis     Diverticulosis     Familial hypercholesterolemia 2022    Former smoker 2022    Heart attack     Heart disease     History of myocardial infarction 2022    Hyperlipidemia     Hypertension     Hypertension 2022    ICD (implantable cardioverter-defibrillator) in place     Non-ST elevation myocardial infarction (NSTEMI) 2019       Past Surgical History:   Procedure Laterality Date    ATRIAL CARDIAC PACEMAKER INSERTION       SECTION      CORONARY STENT PLACEMENT      LEFT HEART CATHETERIZATION Left 2022    Procedure: CATHETERIZATION, HEART, LEFT;  Surgeon: Yohannes Cordova MD;  Location: [facility] CATH LAB;  Service: Cardiology;  Laterality: Left;       Review of patient's allergies indicates:   Allergen Reactions    Augmentin [amoxicillin-pot clavulanate] Swelling     Not allergic to amoxicillin just a derivative of augmentin    Lisinopril Other (See Comments)     Fluid around heart    Losartan Other (See Comments)     High potassium    Shellfish containing products Anaphylaxis     Pt.states she is allergic to SEAFOOD since the age of 13    Levaquin [levofloxacin] Other (See Comments)     Very bad joint pain    Clindamycin Palpitations     Chest pain       No current facility-administered medications on file prior to encounter.     Current Outpatient Medications on File Prior to Encounter   Medication Sig    acetaminophen (TYLENOL) 500 MG tablet Take 2 tablets (1,000 mg total) by mouth every 8 (eight) hours as needed for Pain.     albuterol (PROVENTIL/VENTOLIN HFA) 90 mcg/actuation inhaler Inhale 2 puffs into the lungs every 6 (six) hours as needed for Wheezing. Rescue    ALPRAZolam (XANAX) 0.5 MG tablet Take 1 tablet (0.5 mg total) by mouth nightly as needed for Anxiety.    aspirin (ECOTRIN) 81 MG EC tablet Take 1 tablet (81 mg total) by mouth once daily.    carvediloL (COREG) 12.5 MG tablet Take 1 tablet (12.5 mg total) by mouth 2 (two) times daily with meals.    cefdinir (OMNICEF) 125 mg/5 mL suspension Take 12 mLs (300 mg total) by mouth every 12 (twelve) hours.    dicyclomine (BENTYL) 10 MG capsule Take 1 capsule (10 mg total) by mouth 4 (four) times daily as needed.    ezetimibe (ZETIA) 10 mg tablet Take 1 tablet (10 mg total) by mouth every evening.    fluticasone propionate (FLONASE) 50 mcg/actuation nasal spray 1 spray (50 mcg total) by Each Nostril route once daily.    Lactobacillus rhamnosus GG (CULTURELLE) 10 billion cell capsule Take 1 capsule by mouth once daily.    metronidazole (FLAGYL) 50 mg/mL Syrg Take 10 mLs (500 mg total) by mouth every 8 (eight) hours.    nitroGLYCERIN (NITROSTAT) 0.4 MG SL tablet Place 1 tablet (0.4 mg total) under the tongue every 5 (five) minutes as needed for Chest pain. (Patient not taking: Reported on 4/20/2022)    omeprazole (PRILOSEC) 40 MG capsule Take 1 capsule (40 mg total) by mouth once daily.    prasugreL (EFFIENT) 10 mg Tab Take 1 tablet (10 mg total) by mouth once daily.    promethazine (PROMETHEGAN) 25 MG suppository Place 1 suppository (25 mg total) rectally every 6 (six) hours as needed for Nausea.    simethicone (MYLICON) 80 MG chewable tablet Take 80 mg by mouth every 8 (eight) hours as needed.    spironolactone (ALDACTONE) 25 MG tablet Take 1 tablet (25 mg total) by mouth once daily.     Family History       Problem Relation (Age of Onset)    Emphysema Father    Heart attack Brother    Heart disease Mother          Tobacco Use    Smoking status: Former Smoker     Packs/day: 0.50      Start date: 1976     Quit date: 2012     Years since quittin.3    Smokeless tobacco: Never Used   Substance and Sexual Activity    Alcohol use: No    Drug use: No    Sexual activity: Not on file     Review of Systems   Constitutional: Positive for chills.   HENT:  Negative for congestion.    Eyes:  Negative for blurred vision.   Cardiovascular:  Positive for dyspnea on exertion, leg swelling and orthopnea.   Respiratory:  Positive for shortness of breath. Negative for cough and hemoptysis.    Genitourinary:         Feces and air in urine   Neurological:  Positive for weakness. Negative for focal weakness.   Psychiatric/Behavioral:  Negative for altered mental status and depression.    Objective:     Vital Signs (Most Recent):  Temp: 98.5 °F (36.9 °C) (22 1111)  Pulse: 80 (22 1112)  Resp: 18 (22 1111)  BP: 105/70 (22 1111)  SpO2: (!) 92 % (22 1111)   Vital Signs (24h Range):  Temp:  [97.5 °F (36.4 °C)-99 °F (37.2 °C)] 98.5 °F (36.9 °C)  Pulse:  [] 80  Resp:  [16-22] 18  SpO2:  [92 %-95 %] 92 %  BP: (105-152)/(56-76) 105/70     Weight: 53.1 kg (117 lb 1 oz)  Body mass index is 20.74 kg/m².    SpO2: (!) 92 %       No intake or output data in the 24 hours ending 22 1324    Lines/Drains/Airways       Peripherally Inserted Central Catheter Line  Duration             PICC Double Lumen 22 1424 right basilic <1 day              Peripheral Intravenous Line  Duration                  Peripheral IV - Single Lumen 22 1645 20 G Left Antecubital 1 day                    Physical Exam  Vitals and nursing note reviewed.   Constitutional:       Appearance: She is ill-appearing.   HENT:      Head: Normocephalic and atraumatic.      Right Ear: External ear normal.      Left Ear: External ear normal.      Nose: Nose normal.      Mouth/Throat:      Pharynx: Oropharynx is clear.   Eyes:      Conjunctiva/sclera: Conjunctivae normal.   Neck:      Comments:  +JVD  Cardiovascular:      Rate and Rhythm: Normal rate and regular rhythm.      Pulses: Normal pulses.      Heart sounds: Normal heart sounds.   Pulmonary:      Effort: No respiratory distress.      Breath sounds: No rales.   Abdominal:      General: Abdomen is flat.      Palpations: Abdomen is soft.   Musculoskeletal:         General: Swelling present.      Right lower leg: Edema present.      Left lower leg: Edema present.   Skin:     General: Skin is warm.      Coloration: Skin is not pale.   Neurological:      General: No focal deficit present.      Mental Status: She is alert and oriented to person, place, and time.   Psychiatric:         Mood and Affect: Mood normal.         Behavior: Behavior normal.       Significant Labs: All pertinent lab results from the last 24 hours have been reviewed.    Significant Imaging: EKG: reviewed

## 2022-04-22 NOTE — HOSPITAL COURSE
Rim enhancing abscess extending from the sigmoid colon to the rectum and left posterolateral vaginal cuff with a suspected underlying colorectal fistula.  No definite intravaginal air to suggest a colovaginal fistula.     Colovesical fistula communicating the anterior sigmoid colon with the left lateral bladder dome.     Evolving CT findings of acute on chronic sigmoid colon diverticulitis.     Significant amount of atherosclerotic plaques throughout the abdominal aorta.    Cardiology consulted for surgical clearance and optimization

## 2022-04-22 NOTE — PROGRESS NOTES
[facility]  Colorectal Surgery  Progress Note    Patient Name: Odette Hart  MRN: [id number]  Admission Date: 4/20/2022  Hospital Length of Stay: 2 days  Attending Physician: Joseph Potter MD    Subjective:     Interval History: Patient seen and examined at bedside. Overnight refusing IVF, TPN, lovenox, and insulin. Discussed with patient importance of IV nutrition, glucose control, and DVT prophylaxis. Patient agreeable to accept medications. Also complaining of SOB despite SpO2 97%. 2L NC applied for patient comfort. Denies abdominal pain but endorses nausea. Tolerated small sips of fluids.     Post-Op Info:  * No surgery found *          Medications:  Continuous Infusions:   dextrose 10 % in water (D10W)      TPN ADULT CENTRAL LINE CUSTOM 34 mL/hr at 04/21/22 2116    TPN ADULT CENTRAL LINE CUSTOM       Scheduled Meds:   aspirin  81 mg Oral Daily    carvediloL  12.5 mg Oral BID WM    enoxaparin  40 mg Subcutaneous Daily    fat emulsion 20%  250 mL Intravenous Daily    fat emulsion 20%  250 mL Intravenous Daily    pantoprazole  40 mg Oral Daily    piperacillin-tazobactam (ZOSYN) IVPB  4.5 g Intravenous Q8H    prasugreL  10 mg Oral Daily    sodium chloride 0.9%  10 mL Intravenous Q6H     PRN Meds:   acetaminophen    albuterol    ALPRAZolam    dextrose 10 % in water (D10W)    dextrose 10%    dicyclomine    glucagon (human recombinant)    insulin aspart U-100    naloxone    ondansetron    prochlorperazine    sodium chloride 0.9%    sodium chloride 0.9%        Objective:     Vital Signs (Most Recent):  Temp: 97.5 °F (36.4 °C) (04/22/22 0710)  Pulse: (!) 117 (04/22/22 0710)  Resp: (!) 22 (04/22/22 0710)  BP: 119/76 (04/22/22 0710)  SpO2: 95 % (04/22/22 0710)   Vital Signs (24h Range):  Temp:  [97.5 °F (36.4 °C)-99 °F (37.2 °C)] 97.5 °F (36.4 °C)  Pulse:  [] 117  Resp:  [16-22] 22  SpO2:  [92 %-96 %] 95 %  BP: (101-152)/(55-76) 119/76     Intake/Output - Last 3 Shifts          04/20 0700  04/21 0659 04/21 0700  04/22 0659 04/22 0700  04/23 0659    IV Piggyback 884.3      Total Intake(mL/kg) 884.3 (16.7)      Net +884.3             Stool Occurrence 0 x              Physical Exam  Vitals and nursing note reviewed.   Constitutional:       General: She is not in acute distress.     Appearance: She is well-developed. She is ill-appearing.   HENT:      Head: Normocephalic.   Eyes:      Pupils: Pupils are equal, round, and reactive to light.   Cardiovascular:      Rate and Rhythm: Normal rate and regular rhythm.      Heart sounds: Normal heart sounds.   Pulmonary:      Effort: Pulmonary effort is normal. No respiratory distress.      Breath sounds: Normal breath sounds. No wheezing or rales.   Abdominal:      Palpations: Abdomen is soft. There is no mass.      Tenderness: There is no guarding or rebound.   Skin:     General: Skin is warm and dry.   Neurological:      Mental Status: She is alert and oriented to person, place, and time.   Psychiatric:         Behavior: Behavior normal.         Thought Content: Thought content normal.         Judgment: Judgment normal.           Significant Labs:  BMP (Last 3 Results):   Recent Labs   Lab 04/18/22 2009 04/20/22  2134 04/21/22  0629   * 99 85   * 136 136   K 4.1 4.4 3.9   CL 98 100 102   CO2 24 28 25   BUN 10 8 7*   CREATININE 0.7 0.6 0.7   CALCIUM 8.6* 8.4* 8.3*       CBC (Last 3 Results):   Recent Labs   Lab 04/18/22 2009 04/20/22  1713 04/21/22  0629   WBC 2.90* 3.20* 3.08*   RBC 3.91* 4.16 4.01   HGB 10.9* 11.4* 10.6*   HCT 32.7* 34.9* 33.6*    205 265   MCV 84 84 84   MCH 27.9 27.4 26.4*   MCHC 33.3 32.7 31.5*       Prealbumin:   Recent Labs   Lab 04/20/22 2134   PREALBUMIN 7*         Significant Diagnostics:  CT A/P:   Rim enhancing abscess extending from the sigmoid colon to the rectum and left posterolateral vaginal cuff with a suspected underlying colorectal fistula.  No definite intravaginal air to suggest a  colovaginal fistula.     Colovesical fistula communicating the anterior sigmoid colon with the left lateral bladder dome.     Evolving CT findings of acute on chronic sigmoid colon diverticulitis.     Significant amount of atherosclerotic plaques throughout the abdominal aorta.    Assessment/Plan:     * Colovesical fistula  Admitted from home with known colovesical fistula    -cont IV Zosyn  -monitor I&O  -monitor vs  -surgical planning    Severe malnutrition  Nutrition consulted. Most recent weight and BMI monitored-     Malnutrition Type and Energy Intake  Malnutrition Type: chronic illness    Malnutrition (Moderate to Severe)  Weight Loss (Malnutrition):  (14% x 3 months per chart review)  Energy Intake (Malnutrition): less than 75% for greater than or equal to 1 month (susected <75% x 3 months given wt loss)       PICC-TPN        Measurements:  Wt Readings from Last 1 Encounters:   04/20/22 53.1 kg (117 lb 1 oz)   Body mass index is 20.74 kg/m².    Recommendations: Recommendation/Intervention: 1.  Goals: receive nutrition by RD follow-up    Patient has been screened and assessed by RD. RD will follow patient.    TPN/lipids concentrated and running at low rate given malnutrition/CHF      Chronic combined systolic and diastolic congestive heart failure  Patient is identified as having Combined Systolic and Diastolic heart failure that is Chronic. CHF is currently controlled. Latest ECHO performed and demonstrates- Results for orders placed during the hospital encounter of 01/22/22    Echo Saline Bubble? No    Interpretation Summary  · The left ventricle is severely enlarged with mild eccentric hypertrophy and severely decreased systolic function.  · There are segmental left ventricular wall motion abnormalities. The mid anteroseptal and anterior walls are akinetic. The apex is akinetic. The remainder of the left ventricular walls are severely hypokinetic.  · The estimated ejection fraction is 20%.  · Spontaneous  echocardiographic contrast in the left ventricle.  · Grade III left ventricular diastolic dysfunction.  · Severe left atrial enlargement.  · Normal right ventricular size with normal right ventricular systolic function.  · Mild right atrial enlargement.  · Mild aortic regurgitation.  · Mild-to-moderate mitral regurgitation.  · Mild tricuspid regurgitation.  · The estimated PA systolic pressure is 28 mmHg.  · Intermediate central venous pressure (8 mmHg).  · Pacemaker/ICD lead.  Cont home meds  Monitor weight and vs  ..      Primary hypertension  Chronic, controlled.  Latest blood pressure and vitals reviewed-   Temp:  [96.3 °F (35.7 °C)-97.9 °F (36.6 °C)]   Pulse:  [65-82]   Resp:  [16-22]   BP: ()/(52-55)   SpO2:  [92 %-100 %] .   Home meds for hypertension were reviewed and noted below.   Hypertension Medications             carvediloL (COREG) 12.5 MG tablet Take 1 tablet (12.5 mg total) by mouth 2 (two) times daily with meals.    nitroGLYCERIN (NITROSTAT) 0.4 MG SL tablet Place 1 tablet (0.4 mg total) under the tongue every 5 (five) minutes as needed for Chest pain.    spironolactone (ALDACTONE) 25 MG tablet Take 1 tablet (25 mg total) by mouth once daily.          While in the hospital, will manage blood pressure as follows; Continue home antihypertensive regimen    Will utilize p.r.n. blood pressure medication only if patient's blood pressure greater than  180/110 and she develops symptoms such as worsening chest pain or shortness of breath.      Coronary artery disease of native artery of native heart with stable angina pectoris  Tele  Cont home meds  Monitor vs    Acute diverticulitis  CT imaging reviewed with IR, no benefit from aspiration/drainage at this time    Automatic implantable cardioverter-defibrillator in situ  Tele  Cont home meds  Monitor vs          Suri Candelario MD  Colorectal Surgery  [facility]

## 2022-04-22 NOTE — ASSESSMENT & PLAN NOTE
Patient is currently short of breath. Unable to perform ADLs currently without becoming short of breath. Reports at baseline is able to walk to mailbox    RCRI class IV indicating a 15 % chance of 30-day risk of death, MI, or cardiac arrest  Would recommend diuresis prior to major operation

## 2022-04-22 NOTE — PLAN OF CARE
POC reviewed with pt and family.  Pt A & O x 4, adequate urine output via commode. Vital signs stable on room air. Pt denies pain.  Pt NSR on telemetry.

## 2022-04-22 NOTE — ASSESSMENT & PLAN NOTE
See   View All Notes    Coronary artery disease of native artery of native heart with stable angina pectoris

## 2022-04-23 LAB
ANION GAP SERPL CALC-SCNC: 8 MMOL/L (ref 8–16)
ANISOCYTOSIS BLD QL SMEAR: SLIGHT
BASOPHILS # BLD AUTO: 0.02 K/UL (ref 0–0.2)
BASOPHILS NFR BLD: 0.7 % (ref 0–1.9)
BUN SERPL-MCNC: 15 MG/DL (ref 8–23)
CALCIUM SERPL-MCNC: 7.6 MG/DL (ref 8.7–10.5)
CHLORIDE SERPL-SCNC: 98 MMOL/L (ref 95–110)
CO2 SERPL-SCNC: 31 MMOL/L (ref 23–29)
CREAT SERPL-MCNC: 0.6 MG/DL (ref 0.5–1.4)
DIFFERENTIAL METHOD: ABNORMAL
EOSINOPHIL # BLD AUTO: 0 K/UL (ref 0–0.5)
EOSINOPHIL NFR BLD: 0.3 % (ref 0–8)
ERYTHROCYTE [DISTWIDTH] IN BLOOD BY AUTOMATED COUNT: 21 % (ref 11.5–14.5)
EST. GFR  (AFRICAN AMERICAN): >60 ML/MIN/1.73 M^2
EST. GFR  (NON AFRICAN AMERICAN): >60 ML/MIN/1.73 M^2
GLUCOSE SERPL-MCNC: 113 MG/DL (ref 70–110)
HCT VFR BLD AUTO: 33 % (ref 37–48.5)
HGB BLD-MCNC: 11 G/DL (ref 12–16)
HYPOCHROMIA BLD QL SMEAR: ABNORMAL
IMM GRANULOCYTES # BLD AUTO: 0.01 K/UL (ref 0–0.04)
IMM GRANULOCYTES NFR BLD AUTO: 0.3 % (ref 0–0.5)
LYMPHOCYTES # BLD AUTO: 1.7 K/UL (ref 1–4.8)
LYMPHOCYTES NFR BLD: 55.4 % (ref 18–48)
MAGNESIUM SERPL-MCNC: 1.9 MG/DL (ref 1.6–2.6)
MCH RBC QN AUTO: 27.5 PG (ref 27–31)
MCHC RBC AUTO-ENTMCNC: 33.3 G/DL (ref 32–36)
MCV RBC AUTO: 83 FL (ref 82–98)
MONOCYTES # BLD AUTO: 0.5 K/UL (ref 0.3–1)
MONOCYTES NFR BLD: 16.4 % (ref 4–15)
NEUTROPHILS # BLD AUTO: 0.8 K/UL (ref 1.8–7.7)
NEUTROPHILS NFR BLD: 26.9 % (ref 38–73)
NRBC BLD-RTO: 0 /100 WBC
OVALOCYTES BLD QL SMEAR: ABNORMAL
PHOSPHATE SERPL-MCNC: 2.5 MG/DL (ref 2.7–4.5)
PLATELET # BLD AUTO: 209 K/UL (ref 150–450)
PLATELET BLD QL SMEAR: ABNORMAL
PMV BLD AUTO: 10.1 FL (ref 9.2–12.9)
POCT GLUCOSE: 134 MG/DL (ref 70–110)
POCT GLUCOSE: 140 MG/DL (ref 70–110)
POCT GLUCOSE: 143 MG/DL (ref 70–110)
POCT GLUCOSE: 168 MG/DL (ref 70–110)
POIKILOCYTOSIS BLD QL SMEAR: SLIGHT
POTASSIUM SERPL-SCNC: 3.3 MMOL/L (ref 3.5–5.1)
RBC # BLD AUTO: 4 M/UL (ref 4–5.4)
SODIUM SERPL-SCNC: 137 MMOL/L (ref 136–145)
WBC # BLD AUTO: 2.98 K/UL (ref 3.9–12.7)

## 2022-04-23 PROCEDURE — 63600175 PHARM REV CODE 636 W HCPCS: Performed by: STUDENT IN AN ORGANIZED HEALTH CARE EDUCATION/TRAINING PROGRAM

## 2022-04-23 PROCEDURE — B4185 PARENTERAL SOL 10 GM LIPIDS: HCPCS

## 2022-04-23 PROCEDURE — A4216 STERILE WATER/SALINE, 10 ML: HCPCS | Performed by: COLON & RECTAL SURGERY

## 2022-04-23 PROCEDURE — 85025 COMPLETE CBC W/AUTO DIFF WBC: CPT | Performed by: STUDENT IN AN ORGANIZED HEALTH CARE EDUCATION/TRAINING PROGRAM

## 2022-04-23 PROCEDURE — 63600175 PHARM REV CODE 636 W HCPCS

## 2022-04-23 PROCEDURE — A4217 STERILE WATER/SALINE, 500 ML: HCPCS

## 2022-04-23 PROCEDURE — 84100 ASSAY OF PHOSPHORUS: CPT | Performed by: STUDENT IN AN ORGANIZED HEALTH CARE EDUCATION/TRAINING PROGRAM

## 2022-04-23 PROCEDURE — 25000003 PHARM REV CODE 250

## 2022-04-23 PROCEDURE — 80048 BASIC METABOLIC PNL TOTAL CA: CPT | Performed by: STUDENT IN AN ORGANIZED HEALTH CARE EDUCATION/TRAINING PROGRAM

## 2022-04-23 PROCEDURE — 25000003 PHARM REV CODE 250: Performed by: COLON & RECTAL SURGERY

## 2022-04-23 PROCEDURE — 25000003 PHARM REV CODE 250: Performed by: STUDENT IN AN ORGANIZED HEALTH CARE EDUCATION/TRAINING PROGRAM

## 2022-04-23 PROCEDURE — 20600001 HC STEP DOWN PRIVATE ROOM

## 2022-04-23 PROCEDURE — 83735 ASSAY OF MAGNESIUM: CPT | Performed by: STUDENT IN AN ORGANIZED HEALTH CARE EDUCATION/TRAINING PROGRAM

## 2022-04-23 RX ORDER — MUPIROCIN 20 MG/G
OINTMENT TOPICAL 2 TIMES DAILY
Status: DISCONTINUED | OUTPATIENT
Start: 2022-04-23 | End: 2022-04-27 | Stop reason: HOSPADM

## 2022-04-23 RX ORDER — POTASSIUM CHLORIDE 20 MEQ/1
40 TABLET, EXTENDED RELEASE ORAL ONCE
Status: COMPLETED | OUTPATIENT
Start: 2022-04-23 | End: 2022-04-23

## 2022-04-23 RX ADMIN — MUPIROCIN: 20 OINTMENT TOPICAL at 09:04

## 2022-04-23 RX ADMIN — POTASSIUM CHLORIDE 40 MEQ: 20 TABLET, EXTENDED RELEASE ORAL at 11:04

## 2022-04-23 RX ADMIN — FUROSEMIDE 40 MG: 10 INJECTION, SOLUTION INTRAMUSCULAR; INTRAVENOUS at 06:04

## 2022-04-23 RX ADMIN — ONDANSETRON 4 MG: 2 INJECTION INTRAMUSCULAR; INTRAVENOUS at 01:04

## 2022-04-23 RX ADMIN — CARVEDILOL 12.5 MG: 12.5 TABLET, FILM COATED ORAL at 08:04

## 2022-04-23 RX ADMIN — Medication 10 ML: at 06:04

## 2022-04-23 RX ADMIN — MAGNESIUM SULFATE HEPTAHYDRATE: 500 INJECTION, SOLUTION INTRAMUSCULAR; INTRAVENOUS at 11:04

## 2022-04-23 RX ADMIN — PIPERACILLIN SODIUM AND TAZOBACTAM SODIUM 4.5 G: 4; .5 INJECTION, POWDER, FOR SOLUTION INTRAVENOUS at 04:04

## 2022-04-23 RX ADMIN — SODIUM CHLORIDE, SODIUM LACTATE, POTASSIUM CHLORIDE, AND CALCIUM CHLORIDE 500 ML: .6; .31; .03; .02 INJECTION, SOLUTION INTRAVENOUS at 10:04

## 2022-04-23 RX ADMIN — PANTOPRAZOLE SODIUM 40 MG: 40 TABLET, DELAYED RELEASE ORAL at 11:04

## 2022-04-23 RX ADMIN — ASPIRIN 81 MG: 81 TABLET, COATED ORAL at 11:04

## 2022-04-23 RX ADMIN — MUPIROCIN: 20 OINTMENT TOPICAL at 11:04

## 2022-04-23 RX ADMIN — PIPERACILLIN SODIUM AND TAZOBACTAM SODIUM 4.5 G: 4; .5 INJECTION, POWDER, FOR SOLUTION INTRAVENOUS at 08:04

## 2022-04-23 RX ADMIN — ACETAMINOPHEN 650 MG: 325 TABLET ORAL at 04:04

## 2022-04-23 RX ADMIN — Medication 10 ML: at 11:04

## 2022-04-23 RX ADMIN — SOYBEAN OIL 250 ML: 20 INJECTION, SOLUTION INTRAVENOUS at 11:04

## 2022-04-23 NOTE — ASSESSMENT & PLAN NOTE
Admitted from home with known colovesical fistula    -cont IV Zosyn  -monitor I&O  -monitor vs  -surgical planning  - Renew TPN  - Continue regular diet, encouraged patient to try boost over ice

## 2022-04-23 NOTE — PLAN OF CARE
POC reviewed with PT, stated understanding.  AOX4, VSS.  Fecal matter from voids this shift, unchanged since admit.   AMB IND in RM and to chair. Self turns in bed.  Educated on OOB this shift, only to chair X1.   R PICC infusing TPN per order.   Tolerated diet this shift.  Some pain reported with voids. PRNs given at this time.  No adverse events this shift, bed low, call light in reach, will cont to manage POC.

## 2022-04-23 NOTE — SUBJECTIVE & OBJECTIVE
Subjective:     Interval History: No acute events overnight. Hemodynamically stable. Says she is feeling better this morning and her work of breathing has improved. She is eating small amounts of food but she is not taking boosts because she doesn't like them warm.     Post-Op Info:  * No surgery found *          Medications:  Continuous Infusions:   dextrose 10 % in water (D10W)      TPN ADULT CENTRAL LINE CUSTOM 38 mL/hr at 04/22/22 2141     Scheduled Meds:   aspirin  81 mg Oral Daily    carvediloL  12.5 mg Oral BID WM    enoxaparin  40 mg Subcutaneous Daily    fat emulsion 20%  250 mL Intravenous Daily    furosemide (LASIX) injection  40 mg Intravenous Q12H    pantoprazole  40 mg Oral Daily    piperacillin-tazobactam (ZOSYN) IVPB  4.5 g Intravenous Q8H    prasugreL  10 mg Oral Daily    sodium chloride 0.9%  10 mL Intravenous Q6H     PRN Meds:   acetaminophen    albuterol    ALPRAZolam    dextrose 10 % in water (D10W)    dextrose 10%    dicyclomine    glucagon (human recombinant)    insulin aspart U-100    naloxone    ondansetron    prochlorperazine    sodium chloride 0.9%    sodium chloride 0.9%        Objective:     Vital Signs (Most Recent):  Temp: 98.2 °F (36.8 °C) (04/23/22 0459)  Pulse: 65 (04/23/22 0714)  Resp: 20 (04/23/22 0459)  BP: (!) 90/55 (04/23/22 0459)  SpO2: (!) 94 % (04/23/22 0459)   Vital Signs (24h Range):  Temp:  [97.6 °F (36.4 °C)-98.5 °F (36.9 °C)] 98.2 °F (36.8 °C)  Pulse:  [] 65  Resp:  [18-20] 20  SpO2:  [92 %-96 %] 94 %  BP: ()/(55-72) 90/55     Intake/Output - Last 3 Shifts         04/21 0700  04/22 0659 04/22 0700 04/23 0659 04/23 0700 04/24 0659    IV Piggyback       Total Intake(mL/kg)       Net                      Physical Exam  Vitals and nursing note reviewed.   Constitutional:       General: She is not in acute distress.     Appearance: She is well-developed.   HENT:      Head: Normocephalic.   Eyes:      Pupils: Pupils are equal, round, and reactive to light.    Cardiovascular:      Rate and Rhythm: Normal rate and regular rhythm.      Heart sounds: Normal heart sounds.   Pulmonary:      Effort: Pulmonary effort is normal. No respiratory distress.      Breath sounds: Normal breath sounds. No wheezing or rales.   Abdominal:      Palpations: Abdomen is soft. There is no mass.      Tenderness: There is no guarding or rebound.   Skin:     General: Skin is warm and dry.   Neurological:      Mental Status: She is alert and oriented to person, place, and time.   Psychiatric:         Behavior: Behavior normal.         Thought Content: Thought content normal.         Judgment: Judgment normal.       Significant Labs:  BMP (Last 3 Results):   Recent Labs   Lab 04/21/22  0629 04/22/22  1026 04/23/22  0445   GLU 85 168* 113*    132* 137   K 3.9 3.9 3.3*    101 98   CO2 25 25 31*   BUN 7* 10 15   CREATININE 0.7 0.8 0.6   CALCIUM 8.3* 7.8* 7.6*   MG  --  2.0  --      CBC (Last 3 Results):   Recent Labs   Lab 04/21/22  0629 04/22/22  1025 04/23/22  0445   WBC 3.08* 3.17* 2.98*   RBC 4.01 4.17 4.00   HGB 10.6* 11.2* 11.0*   HCT 33.6* 34.5* 33.0*    239 209   MCV 84 83 83   MCH 26.4* 26.9* 27.5   MCHC 31.5* 32.5 33.3       Significant Diagnostics:  I have reviewed all pertinent imaging results/findings within the past 24 hours.

## 2022-04-23 NOTE — CONSULTS
"RD consulted for "Discoloration to buttocks". Wound acknowledged. Pt previously seen by RD 4/21. Recommendations below. Will f/u as scheduled/needed.     Thanks!      Recommendations     1. Suggest Custom TPN 75 gm AA / 235 gm Dex + IV lipids to provide 1599 kcal, 75 gm protein, and GIR = 3.07 mg/kg/min.      2. As medically able, ADAT to low fiber/residue + Boost Plus TID with texture per SLP     3. For wound healing, add daily MVI, Vit C and Zinc.      4. RD following.      Goals: receive nutrition by RD follow-up  Nutrition Goal Status: new  Communication of RD Recs:  (POC)     Assessment and Plan     Nutrition Problem:  Severe Protein-Calorie Malnutrition  Malnutrition in the context of Chronic Illness/Injury     Related to (etiology):  Inadequate Energy Intake; Altered GI Function      Signs and Symptoms (as evidenced by):  Energy Intake: <75% of estimated energy requirement for 1 month   Body Fat Depletion: KEO  Muscle Mass Depletion: KEO  Weight Loss: 14% x 3 months per chart review       Interventions (treatment strategy):  Collaboration of nutrition care with other providers   Parenteral Nutrition      Nutrition Diagnosis Status:  New   "

## 2022-04-23 NOTE — PLAN OF CARE
POC reviewed with pt and family.  Pt A & O x 4, adequate urine output via commode. Vital signs stable on room air. Pt denies pain.  Pt NSR on telemetry.      Went to give pt furosemide around 1930, but pt had an accident in bed, wanted to get cleaned up, refused to take med until she got cleaned up.  I returned med to [facility]. Samara said she would pull the med.

## 2022-04-23 NOTE — PLAN OF CARE
Pt Aox4, VS remained stable throughout shift, no PRN medications neccessary. Pt to [facility] ambulatory independent. Educated on POC, resting comfortably, bed low and locked, call light within reach, will continue to monitor.     Problem: Adult Inpatient Plan of Care  Goal: Plan of Care Review  Outcome: Ongoing, Progressing     Problem: Adult Inpatient Plan of Care  Goal: Absence of Hospital-Acquired Illness or Injury  Outcome: Ongoing, Progressing     Problem: Adult Inpatient Plan of Care  Goal: Optimal Comfort and Wellbeing  Outcome: Ongoing, Progressing     Problem: Infection  Goal: Absence of Infection Signs and Symptoms  Outcome: Ongoing, Progressing

## 2022-04-24 LAB
ALBUMIN SERPL BCP-MCNC: 2.1 G/DL (ref 3.5–5.2)
ALP SERPL-CCNC: 75 U/L (ref 55–135)
ALT SERPL W/O P-5'-P-CCNC: 88 U/L (ref 10–44)
ANION GAP SERPL CALC-SCNC: 7 MMOL/L (ref 8–16)
ANION GAP SERPL CALC-SCNC: 7 MMOL/L (ref 8–16)
AST SERPL-CCNC: 128 U/L (ref 10–40)
BACTERIA UR CULT: ABNORMAL
BACTERIA UR CULT: ABNORMAL
BASOPHILS # BLD AUTO: 0.04 K/UL (ref 0–0.2)
BASOPHILS NFR BLD: 1.4 % (ref 0–1.9)
BILIRUB SERPL-MCNC: 0.7 MG/DL (ref 0.1–1)
BUN SERPL-MCNC: 17 MG/DL (ref 8–23)
BUN SERPL-MCNC: 17 MG/DL (ref 8–23)
CALCIUM SERPL-MCNC: 7.8 MG/DL (ref 8.7–10.5)
CALCIUM SERPL-MCNC: 7.8 MG/DL (ref 8.7–10.5)
CHLORIDE SERPL-SCNC: 98 MMOL/L (ref 95–110)
CHLORIDE SERPL-SCNC: 98 MMOL/L (ref 95–110)
CO2 SERPL-SCNC: 31 MMOL/L (ref 23–29)
CO2 SERPL-SCNC: 31 MMOL/L (ref 23–29)
CREAT SERPL-MCNC: 0.6 MG/DL (ref 0.5–1.4)
CREAT SERPL-MCNC: 0.6 MG/DL (ref 0.5–1.4)
DIFFERENTIAL METHOD: ABNORMAL
EOSINOPHIL # BLD AUTO: 0 K/UL (ref 0–0.5)
EOSINOPHIL NFR BLD: 1 % (ref 0–8)
ERYTHROCYTE [DISTWIDTH] IN BLOOD BY AUTOMATED COUNT: 21.2 % (ref 11.5–14.5)
EST. GFR  (AFRICAN AMERICAN): >60 ML/MIN/1.73 M^2
EST. GFR  (AFRICAN AMERICAN): >60 ML/MIN/1.73 M^2
EST. GFR  (NON AFRICAN AMERICAN): >60 ML/MIN/1.73 M^2
EST. GFR  (NON AFRICAN AMERICAN): >60 ML/MIN/1.73 M^2
GLUCOSE SERPL-MCNC: 118 MG/DL (ref 70–110)
GLUCOSE SERPL-MCNC: 118 MG/DL (ref 70–110)
HCT VFR BLD AUTO: 35.7 % (ref 37–48.5)
HGB BLD-MCNC: 11.6 G/DL (ref 12–16)
IMM GRANULOCYTES # BLD AUTO: 0 K/UL (ref 0–0.04)
IMM GRANULOCYTES NFR BLD AUTO: 0 % (ref 0–0.5)
LYMPHOCYTES # BLD AUTO: 1.7 K/UL (ref 1–4.8)
LYMPHOCYTES NFR BLD: 56.4 % (ref 18–48)
MAGNESIUM SERPL-MCNC: 2 MG/DL (ref 1.6–2.6)
MCH RBC QN AUTO: 27.6 PG (ref 27–31)
MCHC RBC AUTO-ENTMCNC: 32.5 G/DL (ref 32–36)
MCV RBC AUTO: 85 FL (ref 82–98)
MONOCYTES # BLD AUTO: 0.6 K/UL (ref 0.3–1)
MONOCYTES NFR BLD: 21.3 % (ref 4–15)
NEUTROPHILS # BLD AUTO: 0.6 K/UL (ref 1.8–7.7)
NEUTROPHILS NFR BLD: 19.9 % (ref 38–73)
NRBC BLD-RTO: 0 /100 WBC
PHOSPHATE SERPL-MCNC: 2.3 MG/DL (ref 2.7–4.5)
PLATELET # BLD AUTO: 211 K/UL (ref 150–450)
PMV BLD AUTO: 11 FL (ref 9.2–12.9)
POCT GLUCOSE: 121 MG/DL (ref 70–110)
POCT GLUCOSE: 125 MG/DL (ref 70–110)
POCT GLUCOSE: 130 MG/DL (ref 70–110)
POCT GLUCOSE: 132 MG/DL (ref 70–110)
POCT GLUCOSE: 132 MG/DL (ref 70–110)
POCT GLUCOSE: 156 MG/DL (ref 70–110)
POTASSIUM SERPL-SCNC: 3.5 MMOL/L (ref 3.5–5.1)
POTASSIUM SERPL-SCNC: 3.5 MMOL/L (ref 3.5–5.1)
PROT SERPL-MCNC: 6.5 G/DL (ref 6–8.4)
RBC # BLD AUTO: 4.2 M/UL (ref 4–5.4)
SODIUM SERPL-SCNC: 136 MMOL/L (ref 136–145)
SODIUM SERPL-SCNC: 136 MMOL/L (ref 136–145)
WBC # BLD AUTO: 2.96 K/UL (ref 3.9–12.7)

## 2022-04-24 PROCEDURE — 20600001 HC STEP DOWN PRIVATE ROOM

## 2022-04-24 PROCEDURE — 25000003 PHARM REV CODE 250: Performed by: STUDENT IN AN ORGANIZED HEALTH CARE EDUCATION/TRAINING PROGRAM

## 2022-04-24 PROCEDURE — 83735 ASSAY OF MAGNESIUM: CPT | Performed by: COLON & RECTAL SURGERY

## 2022-04-24 PROCEDURE — 84100 ASSAY OF PHOSPHORUS: CPT | Performed by: COLON & RECTAL SURGERY

## 2022-04-24 PROCEDURE — A4217 STERILE WATER/SALINE, 500 ML: HCPCS | Performed by: STUDENT IN AN ORGANIZED HEALTH CARE EDUCATION/TRAINING PROGRAM

## 2022-04-24 PROCEDURE — 63600175 PHARM REV CODE 636 W HCPCS

## 2022-04-24 PROCEDURE — B4185 PARENTERAL SOL 10 GM LIPIDS: HCPCS | Performed by: STUDENT IN AN ORGANIZED HEALTH CARE EDUCATION/TRAINING PROGRAM

## 2022-04-24 PROCEDURE — 25000003 PHARM REV CODE 250: Performed by: COLON & RECTAL SURGERY

## 2022-04-24 PROCEDURE — 80053 COMPREHEN METABOLIC PANEL: CPT | Performed by: COLON & RECTAL SURGERY

## 2022-04-24 PROCEDURE — 63600175 PHARM REV CODE 636 W HCPCS: Performed by: STUDENT IN AN ORGANIZED HEALTH CARE EDUCATION/TRAINING PROGRAM

## 2022-04-24 PROCEDURE — A4216 STERILE WATER/SALINE, 10 ML: HCPCS | Performed by: COLON & RECTAL SURGERY

## 2022-04-24 PROCEDURE — 36415 COLL VENOUS BLD VENIPUNCTURE: CPT | Performed by: COLON & RECTAL SURGERY

## 2022-04-24 PROCEDURE — 85025 COMPLETE CBC W/AUTO DIFF WBC: CPT | Performed by: STUDENT IN AN ORGANIZED HEALTH CARE EDUCATION/TRAINING PROGRAM

## 2022-04-24 RX ORDER — POTASSIUM CHLORIDE 20 MEQ/1
40 TABLET, EXTENDED RELEASE ORAL ONCE
Status: COMPLETED | OUTPATIENT
Start: 2022-04-24 | End: 2022-04-24

## 2022-04-24 RX ADMIN — PIPERACILLIN SODIUM AND TAZOBACTAM SODIUM 4.5 G: 4; .5 INJECTION, POWDER, FOR SOLUTION INTRAVENOUS at 04:04

## 2022-04-24 RX ADMIN — PRASUGREL 10 MG: 10 TABLET, FILM COATED ORAL at 08:04

## 2022-04-24 RX ADMIN — MAGNESIUM SULFATE HEPTAHYDRATE: 500 INJECTION, SOLUTION INTRAMUSCULAR; INTRAVENOUS at 09:04

## 2022-04-24 RX ADMIN — PANTOPRAZOLE SODIUM 40 MG: 40 TABLET, DELAYED RELEASE ORAL at 08:04

## 2022-04-24 RX ADMIN — PIPERACILLIN SODIUM AND TAZOBACTAM SODIUM 4.5 G: 4; .5 INJECTION, POWDER, FOR SOLUTION INTRAVENOUS at 12:04

## 2022-04-24 RX ADMIN — FUROSEMIDE 40 MG: 10 INJECTION, SOLUTION INTRAMUSCULAR; INTRAVENOUS at 05:04

## 2022-04-24 RX ADMIN — Medication 10 ML: at 12:04

## 2022-04-24 RX ADMIN — PIPERACILLIN SODIUM AND TAZOBACTAM SODIUM 4.5 G: 4; .5 INJECTION, POWDER, FOR SOLUTION INTRAVENOUS at 08:04

## 2022-04-24 RX ADMIN — ASPIRIN 81 MG: 81 TABLET, COATED ORAL at 08:04

## 2022-04-24 RX ADMIN — ALPRAZOLAM 0.5 MG: 0.5 TABLET ORAL at 12:04

## 2022-04-24 RX ADMIN — ACETAMINOPHEN 650 MG: 325 TABLET ORAL at 02:04

## 2022-04-24 RX ADMIN — MUPIROCIN: 20 OINTMENT TOPICAL at 08:04

## 2022-04-24 RX ADMIN — SOYBEAN OIL 250 ML: 20 INJECTION, SOLUTION INTRAVENOUS at 09:04

## 2022-04-24 RX ADMIN — SODIUM CHLORIDE, SODIUM LACTATE, POTASSIUM CHLORIDE, AND CALCIUM CHLORIDE 1000 ML: .6; .31; .03; .02 INJECTION, SOLUTION INTRAVENOUS at 09:04

## 2022-04-24 RX ADMIN — Medication 10 ML: at 06:04

## 2022-04-24 RX ADMIN — MUPIROCIN: 20 OINTMENT TOPICAL at 09:04

## 2022-04-24 RX ADMIN — POTASSIUM CHLORIDE 40 MEQ: 20 TABLET, EXTENDED RELEASE ORAL at 08:04

## 2022-04-24 NOTE — NURSING
Spoke with Dr Anne Patient is requesting pain medication Also notified of patient persistent low bp States that she will place order for more fluids and speak with Fellow on possibly placing parameters on coreg

## 2022-04-24 NOTE — PLAN OF CARE
POC is reviewed and understood by patient.  Oriented x4. Up to bedside commode. One occurrence of stool in urine. No c/o pain, nausea. Low sBP in the 80s. A bolus of fluids given last night. Coreg held. Post bolus, BP increased to low 100s. Lasix still ordered this morning. Also another bolus of fluids given as ordered. Pt remained asymptomatic all throughout the shift.

## 2022-04-24 NOTE — PROGRESS NOTES
[facility]  Colorectal Surgery  Progress Note    Patient Name: Odette Hart  MRN: [id number]  Admission Date: 4/20/2022  Hospital Length of Stay: 4 days  Attending Physician: Joseph Potter MD    Subjective:     Interval History: Patient seen and examined at bedside. NAEON. Tolerating some PO, able to drink boost. Complains of pain only when she feels she has to have a BM or urinate. Interested in discussing home TPN to avoid hospitalization until surgery.    Post-Op Info:  * No surgery found *          Medications:  Continuous Infusions:   dextrose 10 % in water (D10W)      TPN ADULT CENTRAL LINE CUSTOM 38 mL/hr at 04/23/22 2307    TPN ADULT CENTRAL LINE CUSTOM       Scheduled Meds:   aspirin  81 mg Oral Daily    carvediloL  12.5 mg Oral BID WM    enoxaparin  40 mg Subcutaneous Daily    fat emulsion 20%  250 mL Intravenous Daily    fat emulsion 20%  250 mL Intravenous Daily    furosemide (LASIX) injection  40 mg Intravenous Q12H    mupirocin   Nasal BID    pantoprazole  40 mg Oral Daily    piperacillin-tazobactam (ZOSYN) IVPB  4.5 g Intravenous Q8H    potassium chloride  40 mEq Oral Once    prasugreL  10 mg Oral Daily    sodium chloride 0.9%  10 mL Intravenous Q6H     PRN Meds:   acetaminophen    albuterol    ALPRAZolam    dextrose 10 % in water (D10W)    dextrose 10%    dicyclomine    glucagon (human recombinant)    insulin aspart U-100    naloxone    ondansetron    prochlorperazine    sodium chloride 0.9%    sodium chloride 0.9%        Objective:     Vital Signs (Most Recent):  Temp: 98 °F (36.7 °C) (04/24/22 0426)  Pulse: 63 (04/24/22 0708)  Resp: 20 (04/24/22 0426)  BP: (!) 105/52 (04/24/22 0426)  SpO2: 97 % (04/24/22 0426)   Vital Signs (24h Range):  Temp:  [97.5 °F (36.4 °C)-98 °F (36.7 °C)] 98 °F (36.7 °C)  Pulse:  [59-75] 63  Resp:  [16-20] 20  SpO2:  [95 %-99 %] 97 %  BP: ()/(43-57) 105/52     Intake/Output - Last 3 Shifts         04/22 0700  04/23 0638  04/23 0700  04/24 0659 04/24 0700  04/25 0659    P.O.  0     IV Piggyback  1060.6     TPN  2395.9     Total Intake(mL/kg)  3456.6 (65.1)     Net  +3456.6            Urine Occurrence  6 x     Stool Occurrence  3 x             Physical Exam  Vitals and nursing note reviewed.   Constitutional:       General: She is not in acute distress.     Appearance: She is well-developed.   HENT:      Head: Normocephalic.   Eyes:      Pupils: Pupils are equal, round, and reactive to light.   Cardiovascular:      Rate and Rhythm: Normal rate and regular rhythm.      Heart sounds: Normal heart sounds.   Pulmonary:      Effort: Pulmonary effort is normal. No respiratory distress.      Breath sounds: Normal breath sounds. No wheezing or rales.   Abdominal:      Palpations: Abdomen is soft. There is no mass.      Tenderness: There is no guarding or rebound.   Skin:     General: Skin is warm and dry.   Neurological:      Mental Status: She is alert and oriented to person, place, and time.   Psychiatric:         Behavior: Behavior normal.         Thought Content: Thought content normal.         Judgment: Judgment normal.       Significant Labs:  BMP (Last 3 Results):   Recent Labs   Lab 04/22/22  1026 04/23/22  0445 04/24/22  0611   * 113* 118*  118*   * 137 136  136   K 3.9 3.3* 3.5  3.5    98 98  98   CO2 25 31* 31*  31*   BUN 10 15 17  17   CREATININE 0.8 0.6 0.6  0.6   CALCIUM 7.8* 7.6* 7.8*  7.8*   MG 2.0 1.9 2.0       CBC (Last 3 Results):   Recent Labs   Lab 04/22/22  1025 04/23/22  0445 04/24/22  0611   WBC 3.17* 2.98* 2.96*   RBC 4.17 4.00 4.20   HGB 11.2* 11.0* 11.6*   HCT 34.5* 33.0* 35.7*    209 211   MCV 83 83 85   MCH 26.9* 27.5 27.6   MCHC 32.5 33.3 32.5         Significant Diagnostics:  I have reviewed all pertinent imaging results/findings within the past 24 hours.    Assessment/Plan:     * Colovesical fistula  Admitted from home with known colovesical fistula    -cont IV Zosyn  -monitor  I&O  -monitor vs  -surgical planning  - Renew TPN  - Continue regular diet, encouraged patient to try boost over ice    Severe malnutrition  Nutrition consulted. Most recent weight and BMI monitored-     Malnutrition Type and Energy Intake  Malnutrition Type: chronic illness    Malnutrition (Moderate to Severe)  Weight Loss (Malnutrition):  (14% x 3 months per chart review)  Energy Intake (Malnutrition): less than 75% for greater than or equal to 1 month (susected <75% x 3 months given wt loss)       PICC-TPN        Measurements:  Wt Readings from Last 1 Encounters:   04/20/22 53.1 kg (117 lb 1 oz)   Body mass index is 20.74 kg/m².    Recommendations: Recommendation/Intervention: 1.  Goals: receive nutrition by RD follow-up    Patient has been screened and assessed by RD. RD will follow patient.    TPN/lipids concentrated and running at low rate given malnutrition/CHF      Chronic combined systolic and diastolic congestive heart failure  Patient is identified as having Combined Systolic and Diastolic heart failure that is Chronic. CHF is currently controlled. Latest ECHO performed and demonstrates- Results for orders placed during the hospital encounter of 01/22/22    Echo Saline Bubble? No    Interpretation Summary  · The left ventricle is severely enlarged with mild eccentric hypertrophy and severely decreased systolic function.  · There are segmental left ventricular wall motion abnormalities. The mid anteroseptal and anterior walls are akinetic. The apex is akinetic. The remainder of the left ventricular walls are severely hypokinetic.  · The estimated ejection fraction is 20%.  · Spontaneous echocardiographic contrast in the left ventricle.  · Grade III left ventricular diastolic dysfunction.  · Severe left atrial enlargement.  · Normal right ventricular size with normal right ventricular systolic function.  · Mild right atrial enlargement.  · Mild aortic regurgitation.  · Mild-to-moderate mitral  regurgitation.  · Mild tricuspid regurgitation.  · The estimated PA systolic pressure is 28 mmHg.  · Intermediate central venous pressure (8 mmHg).  · Pacemaker/ICD lead.  Cont home meds  Monitor weight and vs  ..      Primary hypertension  Chronic, controlled.  Latest blood pressure and vitals reviewed-   Temp:  [96.3 °F (35.7 °C)-97.9 °F (36.6 °C)]   Pulse:  [65-82]   Resp:  [16-22]   BP: ()/(52-55)   SpO2:  [92 %-100 %] .   Home meds for hypertension were reviewed and noted below.   Hypertension Medications             carvediloL (COREG) 12.5 MG tablet Take 1 tablet (12.5 mg total) by mouth 2 (two) times daily with meals.    nitroGLYCERIN (NITROSTAT) 0.4 MG SL tablet Place 1 tablet (0.4 mg total) under the tongue every 5 (five) minutes as needed for Chest pain.    spironolactone (ALDACTONE) 25 MG tablet Take 1 tablet (25 mg total) by mouth once daily.          While in the hospital, will manage blood pressure as follows; Continue home antihypertensive regimen    Will utilize p.r.n. blood pressure medication only if patient's blood pressure greater than  180/110 and she develops symptoms such as worsening chest pain or shortness of breath.      Coronary artery disease of native artery of native heart with stable angina pectoris  Tele  Cont home meds  Monitor vs    Acute diverticulitis  CT imaging reviewed with IR, no benefit from aspiration/drainage at this time    Automatic implantable cardioverter-defibrillator in situ  Tele  Cont home meds  Monitor vs          Suri Candelario MD  Colorectal Surgery  [facility]

## 2022-04-24 NOTE — PLAN OF CARE
Problem: Adult Inpatient Plan of Care  Goal: Plan of Care Review  Outcome: Ongoing, Progressing  Goal: Patient-Specific Goal (Individualized)  Outcome: Ongoing, Progressing  Goal: Absence of Hospital-Acquired Illness or Injury  Outcome: Ongoing, Progressing  Goal: Optimal Comfort and Wellbeing  Outcome: Ongoing, Progressing  Goal: Readiness for Transition of Care  Outcome: Ongoing, Progressing  Aao x 3 stable  Noted with several bm today as well as small amount of dark urine noted  CORONEL  Ambulated in room  Tolerating oral intake  Lungs CTA  Abdomen soft   Will continue to monitor

## 2022-04-24 NOTE — SUBJECTIVE & OBJECTIVE
Subjective:     Interval History: Patient seen and examined at bedside. NAEON. Tolerating some PO, able to drink boost. Complains of pain only when she feels she has to have a BM or urinate. Interested in discussing home TPN to avoid hospitalization until surgery.    Post-Op Info:  * No surgery found *          Medications:  Continuous Infusions:   dextrose 10 % in water (D10W)      TPN ADULT CENTRAL LINE CUSTOM 38 mL/hr at 04/23/22 2307    TPN ADULT CENTRAL LINE CUSTOM       Scheduled Meds:   aspirin  81 mg Oral Daily    carvediloL  12.5 mg Oral BID WM    enoxaparin  40 mg Subcutaneous Daily    fat emulsion 20%  250 mL Intravenous Daily    fat emulsion 20%  250 mL Intravenous Daily    furosemide (LASIX) injection  40 mg Intravenous Q12H    mupirocin   Nasal BID    pantoprazole  40 mg Oral Daily    piperacillin-tazobactam (ZOSYN) IVPB  4.5 g Intravenous Q8H    potassium chloride  40 mEq Oral Once    prasugreL  10 mg Oral Daily    sodium chloride 0.9%  10 mL Intravenous Q6H     PRN Meds:   acetaminophen    albuterol    ALPRAZolam    dextrose 10 % in water (D10W)    dextrose 10%    dicyclomine    glucagon (human recombinant)    insulin aspart U-100    naloxone    ondansetron    prochlorperazine    sodium chloride 0.9%    sodium chloride 0.9%        Objective:     Vital Signs (Most Recent):  Temp: 98 °F (36.7 °C) (04/24/22 0426)  Pulse: 63 (04/24/22 0708)  Resp: 20 (04/24/22 0426)  BP: (!) 105/52 (04/24/22 0426)  SpO2: 97 % (04/24/22 0426)   Vital Signs (24h Range):  Temp:  [97.5 °F (36.4 °C)-98 °F (36.7 °C)] 98 °F (36.7 °C)  Pulse:  [59-75] 63  Resp:  [16-20] 20  SpO2:  [95 %-99 %] 97 %  BP: ()/(43-57) 105/52     Intake/Output - Last 3 Shifts         04/22 0700  04/23 0659 04/23 0700 04/24 0659 04/24 0700 04/25 0659    P.O.  0     IV Piggyback  1060.6     TPN  2395.9     Total Intake(mL/kg)  3456.6 (65.1)     Net  +3456.6            Urine Occurrence  6 x     Stool Occurrence  3 x             Physical  Exam  Vitals and nursing note reviewed.   Constitutional:       General: She is not in acute distress.     Appearance: She is well-developed.   HENT:      Head: Normocephalic.   Eyes:      Pupils: Pupils are equal, round, and reactive to light.   Cardiovascular:      Rate and Rhythm: Normal rate and regular rhythm.      Heart sounds: Normal heart sounds.   Pulmonary:      Effort: Pulmonary effort is normal. No respiratory distress.      Breath sounds: Normal breath sounds. No wheezing or rales.   Abdominal:      Palpations: Abdomen is soft. There is no mass.      Tenderness: There is no guarding or rebound.   Skin:     General: Skin is warm and dry.   Neurological:      Mental Status: She is alert and oriented to person, place, and time.   Psychiatric:         Behavior: Behavior normal.         Thought Content: Thought content normal.         Judgment: Judgment normal.       Significant Labs:  BMP (Last 3 Results):   Recent Labs   Lab 04/22/22  1026 04/23/22  0445 04/24/22  0611   * 113* 118*  118*   * 137 136  136   K 3.9 3.3* 3.5  3.5    98 98  98   CO2 25 31* 31*  31*   BUN 10 15 17  17   CREATININE 0.8 0.6 0.6  0.6   CALCIUM 7.8* 7.6* 7.8*  7.8*   MG 2.0 1.9 2.0       CBC (Last 3 Results):   Recent Labs   Lab 04/22/22  1025 04/23/22  0445 04/24/22  0611   WBC 3.17* 2.98* 2.96*   RBC 4.17 4.00 4.20   HGB 11.2* 11.0* 11.6*   HCT 34.5* 33.0* 35.7*    209 211   MCV 83 83 85   MCH 26.9* 27.5 27.6   MCHC 32.5 33.3 32.5         Significant Diagnostics:  I have reviewed all pertinent imaging results/findings within the past 24 hours.

## 2022-04-25 LAB
ANION GAP SERPL CALC-SCNC: 9 MMOL/L (ref 8–16)
ANISOCYTOSIS BLD QL SMEAR: SLIGHT
BASOPHILS # BLD AUTO: 0.06 K/UL (ref 0–0.2)
BASOPHILS NFR BLD: 1.5 % (ref 0–1.9)
BUN SERPL-MCNC: 18 MG/DL (ref 8–23)
CALCIUM SERPL-MCNC: 8.1 MG/DL (ref 8.7–10.5)
CHLORIDE SERPL-SCNC: 97 MMOL/L (ref 95–110)
CO2 SERPL-SCNC: 28 MMOL/L (ref 23–29)
CREAT SERPL-MCNC: 0.6 MG/DL (ref 0.5–1.4)
DACRYOCYTES BLD QL SMEAR: ABNORMAL
DIFFERENTIAL METHOD: ABNORMAL
EOSINOPHIL # BLD AUTO: 0 K/UL (ref 0–0.5)
EOSINOPHIL NFR BLD: 0.5 % (ref 0–8)
ERYTHROCYTE [DISTWIDTH] IN BLOOD BY AUTOMATED COUNT: 21.1 % (ref 11.5–14.5)
EST. GFR  (AFRICAN AMERICAN): >60 ML/MIN/1.73 M^2
EST. GFR  (NON AFRICAN AMERICAN): >60 ML/MIN/1.73 M^2
GLUCOSE SERPL-MCNC: 115 MG/DL (ref 70–110)
HCT VFR BLD AUTO: 34.7 % (ref 37–48.5)
HGB BLD-MCNC: 11 G/DL (ref 12–16)
HYPOCHROMIA BLD QL SMEAR: ABNORMAL
IMM GRANULOCYTES # BLD AUTO: 0.01 K/UL (ref 0–0.04)
IMM GRANULOCYTES NFR BLD AUTO: 0.3 % (ref 0–0.5)
LYMPHOCYTES # BLD AUTO: 2.3 K/UL (ref 1–4.8)
LYMPHOCYTES NFR BLD: 58.8 % (ref 18–48)
MAGNESIUM SERPL-MCNC: 1.8 MG/DL (ref 1.6–2.6)
MCH RBC QN AUTO: 27.9 PG (ref 27–31)
MCHC RBC AUTO-ENTMCNC: 31.7 G/DL (ref 32–36)
MCV RBC AUTO: 88 FL (ref 82–98)
MONOCYTES # BLD AUTO: 0.8 K/UL (ref 0.3–1)
MONOCYTES NFR BLD: 19.8 % (ref 4–15)
NEUTROPHILS # BLD AUTO: 0.8 K/UL (ref 1.8–7.7)
NEUTROPHILS NFR BLD: 19.1 % (ref 38–73)
NRBC BLD-RTO: 0 /100 WBC
OVALOCYTES BLD QL SMEAR: ABNORMAL
PHOSPHATE SERPL-MCNC: 2.1 MG/DL (ref 2.7–4.5)
PLATELET # BLD AUTO: 235 K/UL (ref 150–450)
PMV BLD AUTO: 11.1 FL (ref 9.2–12.9)
POCT GLUCOSE: 108 MG/DL (ref 70–110)
POCT GLUCOSE: 131 MG/DL (ref 70–110)
POCT GLUCOSE: 142 MG/DL (ref 70–110)
POCT GLUCOSE: 150 MG/DL (ref 70–110)
POIKILOCYTOSIS BLD QL SMEAR: SLIGHT
POLYCHROMASIA BLD QL SMEAR: ABNORMAL
POTASSIUM SERPL-SCNC: 3.9 MMOL/L (ref 3.5–5.1)
RBC # BLD AUTO: 3.94 M/UL (ref 4–5.4)
SODIUM SERPL-SCNC: 134 MMOL/L (ref 136–145)
SPHEROCYTES BLD QL SMEAR: ABNORMAL
WBC # BLD AUTO: 3.93 K/UL (ref 3.9–12.7)

## 2022-04-25 PROCEDURE — B4185 PARENTERAL SOL 10 GM LIPIDS: HCPCS | Performed by: COLON & RECTAL SURGERY

## 2022-04-25 PROCEDURE — 25000003 PHARM REV CODE 250: Performed by: NURSE PRACTITIONER

## 2022-04-25 PROCEDURE — 25000003 PHARM REV CODE 250: Performed by: STUDENT IN AN ORGANIZED HEALTH CARE EDUCATION/TRAINING PROGRAM

## 2022-04-25 PROCEDURE — 99232 SBSQ HOSP IP/OBS MODERATE 35: CPT | Mod: ,,, | Performed by: NURSE PRACTITIONER

## 2022-04-25 PROCEDURE — 63600175 PHARM REV CODE 636 W HCPCS: Performed by: NURSE PRACTITIONER

## 2022-04-25 PROCEDURE — A4217 STERILE WATER/SALINE, 500 ML: HCPCS | Performed by: COLON & RECTAL SURGERY

## 2022-04-25 PROCEDURE — 63600175 PHARM REV CODE 636 W HCPCS: Performed by: STUDENT IN AN ORGANIZED HEALTH CARE EDUCATION/TRAINING PROGRAM

## 2022-04-25 PROCEDURE — A4216 STERILE WATER/SALINE, 10 ML: HCPCS | Performed by: COLON & RECTAL SURGERY

## 2022-04-25 PROCEDURE — S0030 INJECTION, METRONIDAZOLE: HCPCS | Performed by: NURSE PRACTITIONER

## 2022-04-25 PROCEDURE — 25000003 PHARM REV CODE 250: Performed by: COLON & RECTAL SURGERY

## 2022-04-25 PROCEDURE — 20600001 HC STEP DOWN PRIVATE ROOM

## 2022-04-25 PROCEDURE — 83735 ASSAY OF MAGNESIUM: CPT | Performed by: COLON & RECTAL SURGERY

## 2022-04-25 PROCEDURE — 99232 PR SUBSEQUENT HOSPITAL CARE,LEVL II: ICD-10-PCS | Mod: ,,, | Performed by: NURSE PRACTITIONER

## 2022-04-25 PROCEDURE — 84100 ASSAY OF PHOSPHORUS: CPT | Performed by: COLON & RECTAL SURGERY

## 2022-04-25 PROCEDURE — 94761 N-INVAS EAR/PLS OXIMETRY MLT: CPT

## 2022-04-25 PROCEDURE — 85025 COMPLETE CBC W/AUTO DIFF WBC: CPT | Performed by: STUDENT IN AN ORGANIZED HEALTH CARE EDUCATION/TRAINING PROGRAM

## 2022-04-25 PROCEDURE — 63600175 PHARM REV CODE 636 W HCPCS: Performed by: COLON & RECTAL SURGERY

## 2022-04-25 PROCEDURE — 25000003 PHARM REV CODE 250

## 2022-04-25 PROCEDURE — 80048 BASIC METABOLIC PNL TOTAL CA: CPT | Performed by: STUDENT IN AN ORGANIZED HEALTH CARE EDUCATION/TRAINING PROGRAM

## 2022-04-25 RX ORDER — CEFEPIME HYDROCHLORIDE 1 G/50ML
1 INJECTION, SOLUTION INTRAVENOUS
Status: DISCONTINUED | OUTPATIENT
Start: 2022-04-25 | End: 2022-04-25

## 2022-04-25 RX ORDER — CEFEPIME HYDROCHLORIDE 1 G/50ML
2 INJECTION, SOLUTION INTRAVENOUS
Status: DISCONTINUED | OUTPATIENT
Start: 2022-04-25 | End: 2022-04-27 | Stop reason: HOSPADM

## 2022-04-25 RX ORDER — TRAMADOL HYDROCHLORIDE 50 MG/1
50 TABLET ORAL EVERY 6 HOURS PRN
Status: DISCONTINUED | OUTPATIENT
Start: 2022-04-25 | End: 2022-04-27 | Stop reason: HOSPADM

## 2022-04-25 RX ORDER — METRONIDAZOLE 500 MG/100ML
500 INJECTION, SOLUTION INTRAVENOUS
Status: DISCONTINUED | OUTPATIENT
Start: 2022-04-25 | End: 2022-04-27 | Stop reason: HOSPADM

## 2022-04-25 RX ADMIN — PROCHLORPERAZINE EDISYLATE 5 MG: 5 INJECTION INTRAMUSCULAR; INTRAVENOUS at 08:04

## 2022-04-25 RX ADMIN — Medication 10 ML: at 12:04

## 2022-04-25 RX ADMIN — MAGNESIUM SULFATE HEPTAHYDRATE: 500 INJECTION, SOLUTION INTRAMUSCULAR; INTRAVENOUS at 10:04

## 2022-04-25 RX ADMIN — PRASUGREL 10 MG: 10 TABLET, FILM COATED ORAL at 09:04

## 2022-04-25 RX ADMIN — ONDANSETRON 4 MG: 2 INJECTION INTRAMUSCULAR; INTRAVENOUS at 09:04

## 2022-04-25 RX ADMIN — TRAMADOL HYDROCHLORIDE 50 MG: 50 TABLET, COATED ORAL at 08:04

## 2022-04-25 RX ADMIN — ACETAMINOPHEN 650 MG: 325 TABLET ORAL at 03:04

## 2022-04-25 RX ADMIN — Medication 10 ML: at 06:04

## 2022-04-25 RX ADMIN — SODIUM PHOSPHATE, MONOBASIC, MONOHYDRATE 15 MMOL: 276; 142 INJECTION, SOLUTION INTRAVENOUS at 12:04

## 2022-04-25 RX ADMIN — METRONIDAZOLE 500 MG: 500 INJECTION, SOLUTION INTRAVENOUS at 11:04

## 2022-04-25 RX ADMIN — CEFEPIME 2 G: 2 INJECTION, POWDER, FOR SOLUTION INTRAVENOUS at 03:04

## 2022-04-25 RX ADMIN — PANTOPRAZOLE SODIUM 40 MG: 40 TABLET, DELAYED RELEASE ORAL at 09:04

## 2022-04-25 RX ADMIN — METRONIDAZOLE 500 MG: 500 INJECTION, SOLUTION INTRAVENOUS at 03:04

## 2022-04-25 RX ADMIN — PIPERACILLIN SODIUM AND TAZOBACTAM SODIUM 4.5 G: 4; .5 INJECTION, POWDER, FOR SOLUTION INTRAVENOUS at 12:04

## 2022-04-25 RX ADMIN — MUPIROCIN: 20 OINTMENT TOPICAL at 08:04

## 2022-04-25 RX ADMIN — MUPIROCIN: 20 OINTMENT TOPICAL at 09:04

## 2022-04-25 RX ADMIN — ASPIRIN 81 MG: 81 TABLET, COATED ORAL at 09:04

## 2022-04-25 RX ADMIN — ALPRAZOLAM 0.5 MG: 0.5 TABLET ORAL at 12:04

## 2022-04-25 RX ADMIN — DICYCLOMINE HYDROCHLORIDE 10 MG: 10 CAPSULE ORAL at 08:04

## 2022-04-25 RX ADMIN — I.V. FAT EMULSION 250 ML: 20 EMULSION INTRAVENOUS at 10:04

## 2022-04-25 NOTE — PROGRESS NOTES
[facility]  Colorectal Surgery  Progress Note    Patient Name: Odette Hart  MRN: [id number]  Admission Date: 4/20/2022  Hospital Length of Stay: 5 days  Attending Physician: Joseph Potter MD    Subjective:     Interval History: no acute events overnite, pos for bms, some emesis, wants to go home before surgery, understands she would need home TPN     Post-Op Info:  * No surgery found *          Medications:  Continuous Infusions:   dextrose 10 % in water (D10W)      TPN ADULT CENTRAL LINE CUSTOM 38 mL/hr at 04/24/22 2136     Scheduled Meds:   aspirin  81 mg Oral Daily    carvediloL  12.5 mg Oral BID WM    enoxaparin  40 mg Subcutaneous Daily    furosemide (LASIX) injection  40 mg Intravenous Q12H    mupirocin   Nasal BID    pantoprazole  40 mg Oral Daily    piperacillin-tazobactam (ZOSYN) IVPB  4.5 g Intravenous Q8H    prasugreL  10 mg Oral Daily    sodium chloride 0.9%  10 mL Intravenous Q6H    sodium phosphate IVPB  15 mmol Intravenous Once     PRN Meds:   acetaminophen    albuterol    ALPRAZolam    dextrose 10 % in water (D10W)    dextrose 10%    dicyclomine    glucagon (human recombinant)    insulin aspart U-100    naloxone    ondansetron    prochlorperazine    sodium chloride 0.9%    sodium chloride 0.9%        Objective:     Vital Signs (Most Recent):  Temp: 96.9 °F (36.1 °C) (04/25/22 0912)  Pulse: 65 (04/25/22 0912)  Resp: 16 (04/25/22 0912)  BP: (!) 106/52 (04/25/22 0912)  SpO2: 100 % (04/25/22 0912)   Vital Signs (24h Range):  Temp:  [96.9 °F (36.1 °C)-98.1 °F (36.7 °C)] 96.9 °F (36.1 °C)  Pulse:  [62-70] 65  Resp:  [16-18] 16  SpO2:  [97 %-100 %] 100 %  BP: ()/(47-57) 106/52     Intake/Output - Last 3 Shifts         04/23 0700  04/24 0659 04/24 0700  04/25 0659 04/25 0700  04/26 0659    P.O. 0 1440     IV Piggyback 1060.6 398.7     TPN 2395.9 1213.9     Total Intake(mL/kg) 3456.6 (65.1) 3052.6 (57.5)     Urine (mL/kg/hr)  4 (0)     Emesis/NG output  1      Stool  3     Total Output  8     Net +3456.6 +3044.6            Urine Occurrence 6 x 5 x     Stool Occurrence 3 x 1 x             Physical Exam  Constitutional:       Appearance: She is well-developed.   HENT:      Head: Normocephalic.   Eyes:      Pupils: Pupils are equal, round, and reactive to light.   Cardiovascular:      Rate and Rhythm: Normal rate and regular rhythm.      Heart sounds: Normal heart sounds.   Pulmonary:      Effort: Pulmonary effort is normal. No respiratory distress.      Breath sounds: Normal breath sounds. No wheezing or rales.   Abdominal:      Palpations: Abdomen is soft. There is no mass.      Tenderness: There is no guarding or rebound.   Skin:     General: Skin is warm and dry.   Neurological:      Mental Status: She is alert and oriented to person, place, and time.   Psychiatric:         Behavior: Behavior normal.         Thought Content: Thought content normal.         Judgment: Judgment normal.           Significant Labs:  BMP (Last 3 Results):   Recent Labs   Lab 04/23/22  0445 04/24/22  0611 04/25/22  0305   * 118*  118* 115*    136  136 134*   K 3.3* 3.5  3.5 3.9   CL 98 98  98 97   CO2 31* 31*  31* 28   BUN 15 17  17 18   CREATININE 0.6 0.6  0.6 0.6   CALCIUM 7.6* 7.8*  7.8* 8.1*   MG 1.9 2.0 1.8     CBC (Last 3 Results):   Recent Labs   Lab 04/23/22  0445 04/24/22  0611 04/25/22  0305   WBC 2.98* 2.96* 3.93   RBC 4.00 4.20 3.94*   HGB 11.0* 11.6* 11.0*   HCT 33.0* 35.7* 34.7*    211 235   MCV 83 85 88   MCH 27.5 27.6 27.9   MCHC 33.3 32.5 31.7*       Significant Diagnostics:  None    Assessment/Plan:     * Colovesical fistula  Admitted from home with known colovesical fistula    -cont IV Zosyn  -monitor I&O  -monitor vs  -surgical planning  - Renew TPN  - Continue regular diet, encouraged patient to try boost over ice    Severe malnutrition  Nutrition consulted. Most recent weight and BMI monitored-     Malnutrition Type and Energy  Intake  Malnutrition Type: chronic illness    Malnutrition (Moderate to Severe)  Weight Loss (Malnutrition):  (14% x 3 months per chart review)  Energy Intake (Malnutrition): less than 75% for greater than or equal to 1 month (susected <75% x 3 months given wt loss)       PICC-TPN        Measurements:  Wt Readings from Last 1 Encounters:   04/20/22 53.1 kg (117 lb 1 oz)   Body mass index is 20.74 kg/m².    Recommendations: Recommendation/Intervention: 1.  Goals: receive nutrition by RD follow-up    Patient has been screened and assessed by RD. RD will follow patient.    TPN/lipids concentrated and running at low rate given malnutrition/CHF  Consult social service for home tpn and IV zosyn for 2-3 weeks       Chronic combined systolic and diastolic congestive heart failure  Patient is identified as having Combined Systolic and Diastolic heart failure that is Chronic. CHF is currently controlled. Latest ECHO performed and demonstrates- Results for orders placed during the hospital encounter of 01/22/22    Echo Saline Bubble? No    Interpretation Summary  · The left ventricle is severely enlarged with mild eccentric hypertrophy and severely decreased systolic function.  · There are segmental left ventricular wall motion abnormalities. The mid anteroseptal and anterior walls are akinetic. The apex is akinetic. The remainder of the left ventricular walls are severely hypokinetic.  · The estimated ejection fraction is 20%.  · Spontaneous echocardiographic contrast in the left ventricle.  · Grade III left ventricular diastolic dysfunction.  · Severe left atrial enlargement.  · Normal right ventricular size with normal right ventricular systolic function.  · Mild right atrial enlargement.  · Mild aortic regurgitation.  · Mild-to-moderate mitral regurgitation.  · Mild tricuspid regurgitation.  · The estimated PA systolic pressure is 28 mmHg.  · Intermediate central venous pressure (8 mmHg).  · Pacemaker/ICD lead.  Cont home  meds  Monitor weight and vs  ..      Primary hypertension  Chronic, controlled.  Latest blood pressure and vitals reviewed-   Temp:  [96.9 °F (36.1 °C)-98.1 °F (36.7 °C)]   Pulse:  [62-70]   Resp:  [16-18]   BP: ()/(47-57)   SpO2:  [97 %-100 %] .   Home meds for hypertension were reviewed and noted below.   Hypertension Medications             carvediloL (COREG) 12.5 MG tablet Take 1 tablet (12.5 mg total) by mouth 2 (two) times daily with meals.    nitroGLYCERIN (NITROSTAT) 0.4 MG SL tablet Place 1 tablet (0.4 mg total) under the tongue every 5 (five) minutes as needed for Chest pain.    spironolactone (ALDACTONE) 25 MG tablet Take 1 tablet (25 mg total) by mouth once daily.          While in the hospital, will manage blood pressure as follows; Continue home antihypertensive regimen    Will utilize p.r.n. blood pressure medication only if patient's blood pressure greater than  180/110 and she develops symptoms such as worsening chest pain or shortness of breath.      Coronary artery disease of native artery of native heart with stable angina pectoris  Tele  Cont home meds  Monitor vs    Acute diverticulitis  CT imaging reviewed with IR, no benefit from aspiration/drainage at this time  Cont IV gino    History of coronary artery stent placement  Continue home meds  tele    Automatic implantable cardioverter-defibrillator in situ  Tele  Cont home meds  Monitor vs    Ischemic cardiomyopathy  Cont home meds  tele          Carola Buckley NP  Colorectal Surgery  [facility]

## 2022-04-25 NOTE — PLAN OF CARE
[facility]  Discharge Reassessment    Primary Care Provider: Braxton Barragan MD    Expected Discharge Date: 4/27/2022    If patient discharges before surgery, she will need to d/c with TPN and IV ABX     Reassessment (most recent)     Discharge Reassessment - 04/25/22 1448        Discharge Reassessment    Assessment Type Discharge Planning Reassessment     Discharge Plan A Home Health     Discharge Plan B Home     DME Needed Upon Discharge  feeding device;nutrition supplies     Discharge Barriers Identified Transportation     Why the patient remains in the hospital Requires continued medical care               Nahomy Jalloh RN, CM   Ext: [phone]

## 2022-04-25 NOTE — PLAN OF CARE
POC is reviewed and understood by patient.  Oriented. Pt's SBP in the 80s. A bolus of fluids given last night. Coreg held. Post bolus, BP increased to low 100s. Lasix still ordered this morning. Pt remained asymptomatic all throughout the shift. Up to bedside commode. One occurrence of stool in urine. No c/o pain, nausea, just discomfort to her finger, but no pain med given due to low BP. Pt refuses any insulin needed. Pt remained asymptomatic all throughout the shift.Call bell in reach. HOB [id number]. Bed in lowest positive, WCTM.

## 2022-04-25 NOTE — NURSING
Pt prefers to wait to take the lasix due to consistent drops in BP, followed with boluses. Awaits response from primary team. Will report to day nurse to follow up on situation. Med currently on hold per patient's request.

## 2022-04-25 NOTE — SUBJECTIVE & OBJECTIVE
Subjective:     Interval History: no acute events overnite, pos for bms, some emesis, wants to go home before surgery, understands she would need home TPN     Post-Op Info:  * No surgery found *          Medications:  Continuous Infusions:   dextrose 10 % in water (D10W)      TPN ADULT CENTRAL LINE CUSTOM 38 mL/hr at 04/24/22 2136     Scheduled Meds:   aspirin  81 mg Oral Daily    carvediloL  12.5 mg Oral BID WM    enoxaparin  40 mg Subcutaneous Daily    furosemide (LASIX) injection  40 mg Intravenous Q12H    mupirocin   Nasal BID    pantoprazole  40 mg Oral Daily    piperacillin-tazobactam (ZOSYN) IVPB  4.5 g Intravenous Q8H    prasugreL  10 mg Oral Daily    sodium chloride 0.9%  10 mL Intravenous Q6H    sodium phosphate IVPB  15 mmol Intravenous Once     PRN Meds:   acetaminophen    albuterol    ALPRAZolam    dextrose 10 % in water (D10W)    dextrose 10%    dicyclomine    glucagon (human recombinant)    insulin aspart U-100    naloxone    ondansetron    prochlorperazine    sodium chloride 0.9%    sodium chloride 0.9%        Objective:     Vital Signs (Most Recent):  Temp: 96.9 °F (36.1 °C) (04/25/22 0912)  Pulse: 65 (04/25/22 0912)  Resp: 16 (04/25/22 0912)  BP: (!) 106/52 (04/25/22 0912)  SpO2: 100 % (04/25/22 0912)   Vital Signs (24h Range):  Temp:  [96.9 °F (36.1 °C)-98.1 °F (36.7 °C)] 96.9 °F (36.1 °C)  Pulse:  [62-70] 65  Resp:  [16-18] 16  SpO2:  [97 %-100 %] 100 %  BP: ()/(47-57) 106/52     Intake/Output - Last 3 Shifts         04/23 0700 04/24 0659 04/24 0700 04/25 0659 04/25 0700 04/26 0659    P.O. 0 1440     IV Piggyback 1060.6 398.7     TPN 2395.9 1213.9     Total Intake(mL/kg) 3456.6 (65.1) 3052.6 (57.5)     Urine (mL/kg/hr)  4 (0)     Emesis/NG output  1     Stool  3     Total Output  8     Net +3456.6 +3044.6            Urine Occurrence 6 x 5 x     Stool Occurrence 3 x 1 x             Physical Exam  Constitutional:       Appearance: She is well-developed.   HENT:      Head: Normocephalic.    Eyes:      Pupils: Pupils are equal, round, and reactive to light.   Cardiovascular:      Rate and Rhythm: Normal rate and regular rhythm.      Heart sounds: Normal heart sounds.   Pulmonary:      Effort: Pulmonary effort is normal. No respiratory distress.      Breath sounds: Normal breath sounds. No wheezing or rales.   Abdominal:      Palpations: Abdomen is soft. There is no mass.      Tenderness: There is no guarding or rebound.   Skin:     General: Skin is warm and dry.   Neurological:      Mental Status: She is alert and oriented to person, place, and time.   Psychiatric:         Behavior: Behavior normal.         Thought Content: Thought content normal.         Judgment: Judgment normal.           Significant Labs:  BMP (Last 3 Results):   Recent Labs   Lab 04/23/22  0445 04/24/22  0611 04/25/22  0305   * 118*  118* 115*    136  136 134*   K 3.3* 3.5  3.5 3.9   CL 98 98  98 97   CO2 31* 31*  31* 28   BUN 15 17  17 18   CREATININE 0.6 0.6  0.6 0.6   CALCIUM 7.6* 7.8*  7.8* 8.1*   MG 1.9 2.0 1.8     CBC (Last 3 Results):   Recent Labs   Lab 04/23/22  0445 04/24/22  0611 04/25/22  0305   WBC 2.98* 2.96* 3.93   RBC 4.00 4.20 3.94*   HGB 11.0* 11.6* 11.0*   HCT 33.0* 35.7* 34.7*    211 235   MCV 83 85 88   MCH 27.5 27.6 27.9   MCHC 33.3 32.5 31.7*       Significant Diagnostics:  None

## 2022-04-25 NOTE — ASSESSMENT & PLAN NOTE
Chronic, controlled.  Latest blood pressure and vitals reviewed-   Temp:  [96.9 °F (36.1 °C)-98.1 °F (36.7 °C)]   Pulse:  [62-70]   Resp:  [16-18]   BP: ()/(47-57)   SpO2:  [97 %-100 %] .   Home meds for hypertension were reviewed and noted below.   Hypertension Medications             carvediloL (COREG) 12.5 MG tablet Take 1 tablet (12.5 mg total) by mouth 2 (two) times daily with meals.    nitroGLYCERIN (NITROSTAT) 0.4 MG SL tablet Place 1 tablet (0.4 mg total) under the tongue every 5 (five) minutes as needed for Chest pain.    spironolactone (ALDACTONE) 25 MG tablet Take 1 tablet (25 mg total) by mouth once daily.          While in the hospital, will manage blood pressure as follows; Continue home antihypertensive regimen    Will utilize p.r.n. blood pressure medication only if patient's blood pressure greater than  180/110 and she develops symptoms such as worsening chest pain or shortness of breath.

## 2022-04-25 NOTE — ASSESSMENT & PLAN NOTE
Nutrition consulted. Most recent weight and BMI monitored-     Malnutrition Type and Energy Intake  Malnutrition Type: chronic illness    Malnutrition (Moderate to Severe)  Weight Loss (Malnutrition):  (14% x 3 months per chart review)  Energy Intake (Malnutrition): less than 75% for greater than or equal to 1 month (susected <75% x 3 months given wt loss)       PICC-TPN        Measurements:  Wt Readings from Last 1 Encounters:   04/20/22 53.1 kg (117 lb 1 oz)   Body mass index is 20.74 kg/m².    Recommendations: Recommendation/Intervention: 1.  Goals: receive nutrition by RD follow-up    Patient has been screened and assessed by RD. RD will follow patient.    TPN/lipids concentrated and running at low rate given malnutrition/CHF  Consult social service for home tpn and IV zosyn for 2-3 weeks

## 2022-04-26 PROBLEM — N30.00 ACUTE CYSTITIS WITHOUT HEMATURIA: Status: ACTIVE | Noted: 2022-04-26

## 2022-04-26 LAB
ANION GAP SERPL CALC-SCNC: 6 MMOL/L (ref 8–16)
ANISOCYTOSIS BLD QL SMEAR: SLIGHT
BASOPHILS # BLD AUTO: 0.08 K/UL (ref 0–0.2)
BASOPHILS NFR BLD: 1.6 % (ref 0–1.9)
BUN SERPL-MCNC: 16 MG/DL (ref 8–23)
CALCIUM SERPL-MCNC: 8.4 MG/DL (ref 8.7–10.5)
CHLORIDE SERPL-SCNC: 101 MMOL/L (ref 95–110)
CO2 SERPL-SCNC: 28 MMOL/L (ref 23–29)
CREAT SERPL-MCNC: 0.6 MG/DL (ref 0.5–1.4)
DIFFERENTIAL METHOD: ABNORMAL
EOSINOPHIL # BLD AUTO: 0.1 K/UL (ref 0–0.5)
EOSINOPHIL NFR BLD: 1.2 % (ref 0–8)
ERYTHROCYTE [DISTWIDTH] IN BLOOD BY AUTOMATED COUNT: 21.9 % (ref 11.5–14.5)
EST. GFR  (AFRICAN AMERICAN): >60 ML/MIN/1.73 M^2
EST. GFR  (NON AFRICAN AMERICAN): >60 ML/MIN/1.73 M^2
GLUCOSE SERPL-MCNC: 106 MG/DL (ref 70–110)
HCT VFR BLD AUTO: 34.5 % (ref 37–48.5)
HGB BLD-MCNC: 10.8 G/DL (ref 12–16)
HYPOCHROMIA BLD QL SMEAR: ABNORMAL
IMM GRANULOCYTES # BLD AUTO: 0 K/UL (ref 0–0.04)
IMM GRANULOCYTES NFR BLD AUTO: 0 % (ref 0–0.5)
LYMPHOCYTES # BLD AUTO: 3.1 K/UL (ref 1–4.8)
LYMPHOCYTES NFR BLD: 61.3 % (ref 18–48)
MAGNESIUM SERPL-MCNC: 1.9 MG/DL (ref 1.6–2.6)
MCH RBC QN AUTO: 27.9 PG (ref 27–31)
MCHC RBC AUTO-ENTMCNC: 31.3 G/DL (ref 32–36)
MCV RBC AUTO: 89 FL (ref 82–98)
MONOCYTES # BLD AUTO: 1 K/UL (ref 0.3–1)
MONOCYTES NFR BLD: 19.6 % (ref 4–15)
NEUTROPHILS # BLD AUTO: 0.8 K/UL (ref 1.8–7.7)
NEUTROPHILS NFR BLD: 16.3 % (ref 38–73)
NRBC BLD-RTO: 0 /100 WBC
PHOSPHATE SERPL-MCNC: 3.4 MG/DL (ref 2.7–4.5)
PLATELET # BLD AUTO: 241 K/UL (ref 150–450)
PLATELET BLD QL SMEAR: ABNORMAL
PMV BLD AUTO: 10.7 FL (ref 9.2–12.9)
POCT GLUCOSE: 127 MG/DL (ref 70–110)
POCT GLUCOSE: 132 MG/DL (ref 70–110)
POCT GLUCOSE: 144 MG/DL (ref 70–110)
POLYCHROMASIA BLD QL SMEAR: ABNORMAL
POTASSIUM SERPL-SCNC: 5.2 MMOL/L (ref 3.5–5.1)
RBC # BLD AUTO: 3.87 M/UL (ref 4–5.4)
SODIUM SERPL-SCNC: 135 MMOL/L (ref 136–145)
WBC # BLD AUTO: 5.01 K/UL (ref 3.9–12.7)

## 2022-04-26 PROCEDURE — A4216 STERILE WATER/SALINE, 10 ML: HCPCS | Performed by: COLON & RECTAL SURGERY

## 2022-04-26 PROCEDURE — 25000003 PHARM REV CODE 250: Performed by: STUDENT IN AN ORGANIZED HEALTH CARE EDUCATION/TRAINING PROGRAM

## 2022-04-26 PROCEDURE — 99232 PR SUBSEQUENT HOSPITAL CARE,LEVL II: ICD-10-PCS | Mod: ,,, | Performed by: NURSE PRACTITIONER

## 2022-04-26 PROCEDURE — 80048 BASIC METABOLIC PNL TOTAL CA: CPT | Performed by: STUDENT IN AN ORGANIZED HEALTH CARE EDUCATION/TRAINING PROGRAM

## 2022-04-26 PROCEDURE — S0030 INJECTION, METRONIDAZOLE: HCPCS | Performed by: NURSE PRACTITIONER

## 2022-04-26 PROCEDURE — 20600001 HC STEP DOWN PRIVATE ROOM

## 2022-04-26 PROCEDURE — 25000003 PHARM REV CODE 250: Performed by: NURSE PRACTITIONER

## 2022-04-26 PROCEDURE — B4185 PARENTERAL SOL 10 GM LIPIDS: HCPCS | Performed by: STUDENT IN AN ORGANIZED HEALTH CARE EDUCATION/TRAINING PROGRAM

## 2022-04-26 PROCEDURE — 85025 COMPLETE CBC W/AUTO DIFF WBC: CPT | Performed by: STUDENT IN AN ORGANIZED HEALTH CARE EDUCATION/TRAINING PROGRAM

## 2022-04-26 PROCEDURE — A4217 STERILE WATER/SALINE, 500 ML: HCPCS | Performed by: STUDENT IN AN ORGANIZED HEALTH CARE EDUCATION/TRAINING PROGRAM

## 2022-04-26 PROCEDURE — 36415 COLL VENOUS BLD VENIPUNCTURE: CPT | Performed by: COLON & RECTAL SURGERY

## 2022-04-26 PROCEDURE — 99232 SBSQ HOSP IP/OBS MODERATE 35: CPT | Mod: ,,, | Performed by: NURSE PRACTITIONER

## 2022-04-26 PROCEDURE — 83735 ASSAY OF MAGNESIUM: CPT | Performed by: COLON & RECTAL SURGERY

## 2022-04-26 PROCEDURE — 25000003 PHARM REV CODE 250: Performed by: COLON & RECTAL SURGERY

## 2022-04-26 PROCEDURE — 63600175 PHARM REV CODE 636 W HCPCS: Performed by: STUDENT IN AN ORGANIZED HEALTH CARE EDUCATION/TRAINING PROGRAM

## 2022-04-26 PROCEDURE — 25000003 PHARM REV CODE 250

## 2022-04-26 PROCEDURE — 84100 ASSAY OF PHOSPHORUS: CPT | Performed by: COLON & RECTAL SURGERY

## 2022-04-26 PROCEDURE — 63600175 PHARM REV CODE 636 W HCPCS: Performed by: NURSE PRACTITIONER

## 2022-04-26 RX ADMIN — ONDANSETRON 4 MG: 2 INJECTION INTRAMUSCULAR; INTRAVENOUS at 02:04

## 2022-04-26 RX ADMIN — METRONIDAZOLE 500 MG: 500 INJECTION, SOLUTION INTRAVENOUS at 06:04

## 2022-04-26 RX ADMIN — Medication 10 ML: at 12:04

## 2022-04-26 RX ADMIN — TRAMADOL HYDROCHLORIDE 50 MG: 50 TABLET, COATED ORAL at 09:04

## 2022-04-26 RX ADMIN — PRASUGREL 10 MG: 10 TABLET, FILM COATED ORAL at 09:04

## 2022-04-26 RX ADMIN — CEFEPIME 2 G: 2 INJECTION, POWDER, FOR SOLUTION INTRAVENOUS at 09:04

## 2022-04-26 RX ADMIN — Medication 10 ML: at 06:04

## 2022-04-26 RX ADMIN — PANTOPRAZOLE SODIUM 40 MG: 40 TABLET, DELAYED RELEASE ORAL at 09:04

## 2022-04-26 RX ADMIN — CEFEPIME 2 G: 2 INJECTION, POWDER, FOR SOLUTION INTRAVENOUS at 12:04

## 2022-04-26 RX ADMIN — I.V. FAT EMULSION 250 ML: 20 EMULSION INTRAVENOUS at 10:04

## 2022-04-26 RX ADMIN — CEFEPIME 2 G: 2 INJECTION, POWDER, FOR SOLUTION INTRAVENOUS at 04:04

## 2022-04-26 RX ADMIN — PROCHLORPERAZINE EDISYLATE 5 MG: 5 INJECTION INTRAMUSCULAR; INTRAVENOUS at 07:04

## 2022-04-26 RX ADMIN — MUPIROCIN: 20 OINTMENT TOPICAL at 10:04

## 2022-04-26 RX ADMIN — ASPIRIN 81 MG: 81 TABLET, COATED ORAL at 09:04

## 2022-04-26 RX ADMIN — METRONIDAZOLE 500 MG: 500 INJECTION, SOLUTION INTRAVENOUS at 10:04

## 2022-04-26 RX ADMIN — METRONIDAZOLE 500 MG: 500 INJECTION, SOLUTION INTRAVENOUS at 02:04

## 2022-04-26 RX ADMIN — MAGNESIUM SULFATE HEPTAHYDRATE: 500 INJECTION, SOLUTION INTRAMUSCULAR; INTRAVENOUS at 10:04

## 2022-04-26 NOTE — PROGRESS NOTES
[facility]  Colorectal Surgery  Progress Note    Patient Name: Odette Hart  MRN: [id number]  Admission Date: 4/20/2022  Hospital Length of Stay: 6 days  Attending Physician: Joseph Potter MD    Subjective:     Interval History: no acute events overnite, eating small amt, pos for bm, no emesis, thinks she is feeling better    Post-Op Info:  * No surgery found *          Medications:  Continuous Infusions:   dextrose 10 % in water (D10W)      TPN ADULT CENTRAL LINE CUSTOM 38 mL/hr at 04/25/22 2222    TPN ADULT CENTRAL LINE CUSTOM       Scheduled Meds:   aspirin  81 mg Oral Daily    carvediloL  12.5 mg Oral BID WM    ceFEPime (MAXIPIME) IVPB  2 g Intravenous Q8H    enoxaparin  40 mg Subcutaneous Daily    fat emulsion 20%  250 mL Intravenous Daily    metronidazole  500 mg Intravenous Q8H    mupirocin   Nasal BID    pantoprazole  40 mg Oral Daily    prasugreL  10 mg Oral Daily    sodium chloride 0.9%  10 mL Intravenous Q6H     PRN Meds:   acetaminophen    albuterol    ALPRAZolam    dextrose 10 % in water (D10W)    dextrose 10%    dicyclomine    glucagon (human recombinant)    insulin aspart U-100    naloxone    ondansetron    prochlorperazine    sodium chloride 0.9%    sodium chloride 0.9%    traMADoL        Objective:     Vital Signs (Most Recent):  Temp: 97.3 °F (36.3 °C) (04/26/22 0712)  Pulse: 74 (04/26/22 0722)  Resp: 16 (04/26/22 0912)  BP: 110/60 (04/26/22 0712)  SpO2: 97 % (04/26/22 0712) Vital Signs (24h Range):  Temp:  [96.3 °F (35.7 °C)-98.1 °F (36.7 °C)] 97.3 °F (36.3 °C)  Pulse:  [68-85] 74  Resp:  [15-20] 16  SpO2:  [95 %-99 %] 97 %  BP: ()/(48-60) 110/60     Intake/Output - Last 3 Shifts         04/24 0700 04/25 0659 04/25 0700 04/26 0659 04/26 0700 04/27 0659    P.O. 1440 550     IV Piggyback 398.7      TPN 1213.9      Total Intake(mL/kg) 3052.6 (57.5) 550 (10.4)     Urine (mL/kg/hr) 4 (0)      Emesis/NG output 1      Stool 3      Total Output 8       Net +3044.6 +550            Urine Occurrence 5 x 4 x     Stool Occurrence 1 x 4 x             Physical Exam  Constitutional:       Appearance: She is well-developed.   HENT:      Head: Normocephalic.   Eyes:      Pupils: Pupils are equal, round, and reactive to light.   Cardiovascular:      Rate and Rhythm: Normal rate and regular rhythm.      Heart sounds: Normal heart sounds.   Pulmonary:      Effort: Pulmonary effort is normal. No respiratory distress.      Breath sounds: Normal breath sounds. No wheezing or rales.   Abdominal:      Palpations: Abdomen is soft. There is no mass.      Tenderness: There is no guarding or rebound.   Skin:     General: Skin is warm and dry.   Neurological:      Mental Status: She is alert and oriented to person, place, and time.   Psychiatric:         Behavior: Behavior normal.         Thought Content: Thought content normal.         Judgment: Judgment normal.           Significant Labs:  BMP (Last 3 Results):   Recent Labs   Lab 04/24/22  0611 04/25/22  0305 04/26/22  0332   *  118* 115* 106     136 134* 135*   K 3.5  3.5 3.9 5.2*   CL 98  98 97 101   CO2 31*  31* 28 28   BUN 17  17 18 16   CREATININE 0.6  0.6 0.6 0.6   CALCIUM 7.8*  7.8* 8.1* 8.4*   MG 2.0 1.8 1.9     CBC (Last 3 Results):   Recent Labs   Lab 04/24/22  0611 04/25/22  0305 04/26/22  0332   WBC 2.96* 3.93 5.01   RBC 4.20 3.94* 3.87*   HGB 11.6* 11.0* 10.8*   HCT 35.7* 34.7* 34.5*    235 241   MCV 85 88 89   MCH 27.6 27.9 27.9   MCHC 32.5 31.7* 31.3*       Significant Diagnostics:  None    Assessment/Plan:     * Colovesical fistula  Admitted from home with known colovesical fistula    -cont maxipime and flagyl  -monitor I&O  -monitor vs  -surgical planning  - Renew TPN  - Continue regular diet, encouraged patient to try boost over ice    Acute cystitis without hematuria  Urine pos for pseudomonas and enterobacteroid, sensiitve to maxapime and flagyl      Severe malnutrition  Nutrition  consulted. Most recent weight and BMI monitored-     Malnutrition Type and Energy Intake  Malnutrition Type: chronic illness    Malnutrition (Moderate to Severe)  Weight Loss (Malnutrition):  (14% x 3 months per chart review)  Energy Intake (Malnutrition): less than 75% for greater than or equal to 1 month (susected <75% x 3 months given wt loss)       PICC-TPN        Measurements:  Wt Readings from Last 1 Encounters:   04/20/22 53.1 kg (117 lb 1 oz)   Body mass index is 20.74 kg/m².    Recommendations: Recommendation/Intervention: 1.  Goals: receive nutrition by RD follow-up    Patient has been screened and assessed by RD. RD will follow patient.    TPN/lipids concentrated and running at low rate given malnutrition/CHF  Consult social service for home tpn and IV antibiotics for 2-3 weeks, seen for teaching for home TPN, no support at home and having difficulty with tubing and machine, will continue to evaulate      Chronic combined systolic and diastolic congestive heart failure  Patient is identified as having Combined Systolic and Diastolic heart failure that is Chronic. CHF is currently controlled. Latest ECHO performed and demonstrates- Results for orders placed during the hospital encounter of 01/22/22    Echo Saline Bubble? No    Interpretation Summary  · The left ventricle is severely enlarged with mild eccentric hypertrophy and severely decreased systolic function.  · There are segmental left ventricular wall motion abnormalities. The mid anteroseptal and anterior walls are akinetic. The apex is akinetic. The remainder of the left ventricular walls are severely hypokinetic.  · The estimated ejection fraction is 20%.  · Spontaneous echocardiographic contrast in the left ventricle.  · Grade III left ventricular diastolic dysfunction.  · Severe left atrial enlargement.  · Normal right ventricular size with normal right ventricular systolic function.  · Mild right atrial enlargement.  · Mild aortic  regurgitation.  · Mild-to-moderate mitral regurgitation.  · Mild tricuspid regurgitation.  · The estimated PA systolic pressure is 28 mmHg.  · Intermediate central venous pressure (8 mmHg).  · Pacemaker/ICD lead.  Cont home meds  Monitor weight and vs  ..      Primary hypertension  Chronic, controlled.  Latest blood pressure and vitals reviewed-   Temp:  [96.3 °F (35.7 °C)-98.1 °F (36.7 °C)]   Pulse:  [68-85]   Resp:  [15-20]   BP: ()/(48-60)   SpO2:  [95 %-99 %] .   Home meds for hypertension were reviewed and noted below.   Hypertension Medications             carvediloL (COREG) 12.5 MG tablet Take 1 tablet (12.5 mg total) by mouth 2 (two) times daily with meals.    nitroGLYCERIN (NITROSTAT) 0.4 MG SL tablet Place 1 tablet (0.4 mg total) under the tongue every 5 (five) minutes as needed for Chest pain.    spironolactone (ALDACTONE) 25 MG tablet Take 1 tablet (25 mg total) by mouth once daily.          While in the hospital, will manage blood pressure as follows; Continue home antihypertensive regimen    Will utilize p.r.n. blood pressure medication only if patient's blood pressure greater than  180/110 and she develops symptoms such as worsening chest pain or shortness of breath.      Coronary artery disease of native artery of native heart with stable angina pectoris  Tele  Cont home meds  Monitor vs    Acute diverticulitis  CT imaging reviewed with IR, no benefit from aspiration/drainage at this time      History of coronary artery stent placement  Continue home meds  tele    Automatic implantable cardioverter-defibrillator in situ  Tele  Cont home meds  Monitor vs    Ischemic cardiomyopathy  Cont home meds  tele          Carola Buckley NP  Colorectal Surgery  [facility]

## 2022-04-26 NOTE — PHYSICIAN QUERY
PT Name: Odette Hart  MR #: [id number]     DOCUMENTATION CLARIFICATION     CDS: Maricarmen CASTAÑEDA RN  Contact information: [email or web address]    This form is a permanent document in the medical record.     Query Date: April 26, 2022    By submitting this query, we are merely seeking further clarification of documentation.  Please utilize your independent clinical judgment when addressing the question(s) below.    The Medical Record contains the following   Indicators Supporting Clinical Findings Location in Medical Record   X Heart Failure documented Patient is identified as having Combined Systolic and Diastolic heart failure that is Chronic. CHF is currently controlled.     History and physical examination suggest that her heart failure currently is decompensated.       Patient is identified as having Combined Systolic and Diastolic heart failure that is Acute on chronic. PN Colorectal Surg 4/21/2022     Consults Cards 4/22/2022    Consults Cards 4/22/2022    BNP     X EF/Echo · The left ventricle is severely enlarged with mild eccentric hypertrophy and severely decreased systolic function.  · There are segmental left ventricular wall motion abnormalities. The mid anteroseptal and anterior walls are akinetic. The apex is akinetic. The remainder of the left ventricular walls are severely hypokinetic.  · The estimated ejection fraction is 20%.  · Spontaneous echocardiographic contrast in the left ventricle.  · Grade III left ventricular diastolic dysfunction.  · Severe left atrial enlargement.  · Normal right ventricular size with normal right ventricular systolic function.  · Mild right atrial enlargement.  · Mild aortic regurgitation.  · Mild-to-moderate mitral regurgitation.  · Mild tricuspid regurgitation.  · The estimated PA systolic pressure is 28 mmHg.  · Intermediate central venous pressure (8 mmHg).  · Pacemaker/ICD lead. Consults Cards 4/22/2022   X Radiology findings Lungs/Pleura: Scattered  peripheral reticular opacities, stable when compared to CT 02/25/2022.  Diffuse interlobular septal thickening, consistent with pulmonary edema. CT Abd Pelvis 4/21/2022   X Subjective/Objective Respiratory Conditions Also complaining of SOB despite SpO2 97%. 2L NC applied for patient comfort.    At risk for surgical complication  Patient is currently short of breath. Unable to perform ADLs currently without becoming short of breath. Reports at baseline is able to walk to mailbox    Feeling better (less SOB) with diuresis      Says she is feeling better this morning and her work of breathing has improved.    PN Colorectal Surg 4/22/2022    Consults Cards 4/22/2022        PN Colorectal Surg 4/23/2022    PN Colorectal Surg 4/23/2022    Recent/Current MI      Heart Transplant, LVAD     X Edema, JVD The patient clinically appears to be volume overloaded as evidenced by rales, JVD, peripheral edema, and shortness of breath  Consults Cards 4/22/2022    Ascites     X Diuretics/Meds furosemide injection 40 mg  Dose: 40 mg  Freq: Every 12 hours (non-standard times) Route: IV  Start: 04/22/22 1845 End: 04/25/22 1844 MAR    X Other Treatment Recommend Lasix 40 IV BID until the patient is euvolemic  Strict IS and Os  Restrict fluid volume  Continue home dose carvedilol Consults Cards 4/22/2022    Other       Heart failure is a clinical diagnosis which includes symptomatic fluid retention, elevated intracardiac pressures, and/or the inability of the heart to deliver adequate blood flow.    Heart Failure with reduced Ejection Fraction (HFrEF) or Systolic Heart Failure (loses ability to contract normally, EF is <40%)    Heart Failure with preserved Ejection Fraction (HFpEF) or Diastolic Heart Failure (stiff ventricles, does not relax properly, EF is >50%)     Heart Failure with Combined Systolic and Diastolic Failure (stiff ventricles, does not relax properly and EF is <50%)    Mid-range or mildly reduced ejection fraction (HFmrEF)  is classified as systolic heart failure.   Common clues to acute exacerbation:  Rapidly progressive symptoms (w/in 2 weeks of presentation), using IV diuretics, using supplemental O2, pulmonary edema on Xray, new or worsening pleural effusion, +JVD or other signs of volume overload, MI w/in 4 weeks, and/or BNP >500  The clinical guidelines noted are only system guidelines, and do not replace the providers clinical judgment.    Provider, please specify the diagnosis associated with the above clinical findings.    Doctor, due to conflicting documentation, please clarify the diagnosis of heart failure     [   ]  Acute on Chronic Combined Systolic and Diastolic Heart Failure - worsening of CHF signs/symptoms in preexisting CHF   [  x ]  Chronic Combined Systolic and Diastolic Heart Failure - pre-existing and stable   [   ]  Other (please specify): ___________________________________   [  ]  Clinically Undetermined       Present on admission (POA) status:  [ x  ]  Yes (Y)   [   ]  No (N)   [   ]  Documentation insufficient to determine if condition is POA (U)   [  ]  Clinically Undetermined (W)     Please document in your progress notes daily for the duration of treatment until resolved and include in your discharge summary.    References:  American Heart Association editorial staff. (2017, May). Ejection Fraction Heart Failure Measurement. American Heart Association. https://www.heart.org/en/health-topics/heart-failure/diagnosing-heart-failure/ejection-fraction-heart-failure-measurement#:~:text=Ejection%20fraction%20(EF)%20is%20a,pushed%20out%20with%20each%20heartbeat  AMY Dominique (2020, December 15). Heart failure with preserved ejection fraction: Clinical manifestations and diagnosis. [employer]. https://www.[employer].com/contents/heart-failure-with-preserved-ejection-fraction-clinical-manifestations-and-diagnosis.  ICD-10-CM/PCS Coding Clinic Third Quarter ICD-10, Effective with discharges: September 8, 2020 [facility]  [employer] [employer] § Heart failure with mid-range or mildly reduced ejection fraction (2020).  Form No. 79933

## 2022-04-26 NOTE — ASSESSMENT & PLAN NOTE
Admitted from home with known colovesical fistula    -cont maxipime and flagyl  -monitor I&O  -monitor vs  -surgical planning  - Renew TPN  - Continue regular diet, encouraged patient to try boost over ice

## 2022-04-26 NOTE — ASSESSMENT & PLAN NOTE
Nutrition consulted. Most recent weight and BMI monitored-     Malnutrition Type and Energy Intake  Malnutrition Type: chronic illness    Malnutrition (Moderate to Severe)  Weight Loss (Malnutrition):  (14% x 3 months per chart review)  Energy Intake (Malnutrition): less than 75% for greater than or equal to 1 month (susected <75% x 3 months given wt loss)       PICC-TPN        Measurements:  Wt Readings from Last 1 Encounters:   04/20/22 53.1 kg (117 lb 1 oz)   Body mass index is 20.74 kg/m².    Recommendations: Recommendation/Intervention: 1.  Goals: receive nutrition by RD follow-up    Patient has been screened and assessed by RD. RD will follow patient.    TPN/lipids concentrated and running at low rate given malnutrition/CHF  Consult social service for home tpn and IV antibiotics for 2-3 weeks, seen for teaching for home TPN, no support at home and having difficulty with tubing and machine, will continue to evaulate

## 2022-04-26 NOTE — PLAN OF CARE
04/26/22 1041   Post-Acute Status   Post-Acute Authorization IV Infusion;Home Health   Home Health Status Referrals Sent   IV Infusion Status Referral(s) sent   Sw  sent HH orders to [facility] to approve [facility] for home TPN and Ivabx and to assign home health agency.    Cristina Kendall LMSW  [facility]  [id number]  ]

## 2022-04-26 NOTE — PLAN OF CARE
[facility]      HOME HEALTH ORDERS  FACE TO FACE ENCOUNTER    Patient Name: Odette Hart  YOB: 1955    PCP: Braxton Barragan MD   PCP Address: [address] / [place] [address]  PCP Phone Number: [phone]  PCP Fax: [phone]    Encounter Date: 4/20/22    Admit to Home Health    Diagnoses:  Active Hospital Problems    Diagnosis  POA    *Colovesical fistula [N32.1]  Yes    At risk for surgical complication [Z91.89]  Not Applicable    Severe malnutrition [E43]  Yes    Chronic combined systolic and diastolic congestive heart failure [I50.42]  Yes    Primary hypertension [I10]  Yes    Former smoker [Z87.891]  Not Applicable    Normocytic anemia [D64.9]  Yes    Familial hypercholesterolemia [E78.01]  Yes    Coronary artery disease of native artery of native heart with stable angina pectoris [I25.118]  Yes     1/24/2022: [facility]: Cath: LAD: Proximal stent patent. LCX: Proximal 80%. LCX: Dominant. Moderate disease. LCX: HEVER 2.75 x 18 mm. Distal dissection. HEVER 2.75 x 22 mm.        Acute diverticulitis [K57.92]  Yes     2- CT   1. Sigmoid colon extensive diverticulosis with wall thickening and surrounding fat stranding suggestive of recurrent diverticulitis.  No free air or intraperitoneal fluid collection.  Given the chronicity of sigmoid colon wall thickening compared to the October 25, 2021 examination, consider sigmoidoscopy follow-up to exclude neoplasia.  2. No rectal contrast extravasation to suggest rectovaginal fistula, however there is persistent gas within the bladder, cul-de-sac, and left adnexae which could be seen in vesicovaginal fistula or colorectal fistula.  An inflamed diverticulum is contiguous with abnormal urinary bladder wall thickening superolaterally on the left.  CT findings are suspicious but not definitive or colovesical fistula.  Could be further evaluated with nonemergent fluoroscopic examination, as clinically indicated.      RBBB with  left anterior fascicular block [I45.2]  Yes    Automatic implantable cardioverter-defibrillator in situ [Z95.810]  Yes    History of coronary artery stent placement [Z95.5]  Not Applicable     1/24/2022: [facility]: Cath: LCX: HEVER 2.75 x 18 mm. HEVER 2.75 x 22 mm.        Ischemic cardiomyopathy [I25.5]  Yes      Resolved Hospital Problems   No resolved problems to display.       Follow Up Appointments:  Future Appointments   Date Time Provider Department Center   5/3/2022 11:00 AM Myranda Jarrett RD, CDE [facility]   6/3/2022 11:00 AM Manju Mcduffie MD [facility] [address]   7/7/2022  2:30 PM Freddy Arenas MD [facility] CARDIO [facility]       Allergies:  Review of patient's allergies indicates:   Allergen Reactions    Augmentin [amoxicillin-pot clavulanate] Swelling     Not allergic to amoxicillin just a derivative of augmentin    Lisinopril Other (See Comments)     Fluid around heart    Losartan Other (See Comments)     High potassium    Shellfish containing products Anaphylaxis     Pt.states she is allergic to SEAFOOD since the age of 13    Levaquin [levofloxacin] Other (See Comments)     Very bad joint pain    Clindamycin Palpitations     Chest pain       Medications: Review discharge medications with patient and family and provide education.    Current Facility-Administered Medications   Medication Dose Route Frequency Provider Last Rate Last Admin    acetaminophen tablet 650 mg  650 mg Oral Q4H PRN Suri Candelario MD   650 mg at 04/25/22 1524    albuterol inhaler 2 puff  2 puff Inhalation Q6H PRN Suri Candelario MD        ALPRAZolam tablet 0.5 mg  0.5 mg Oral Nightly PRN Suri Candelario MD   0.5 mg at 04/25/22 0037    aspirin EC tablet 81 mg  81 mg Oral Daily Suri Candelario MD   81 mg at 04/25/22 0913    carvediloL tablet 12.5 mg  12.5 mg Oral BID WM Suri Candelario MD   12.5 mg at 04/23/22 0812    cefepime in dextrose 5 % IVPB 2 g  2 g Intravenous Q8H Carola Buckley NP   Stopped at  04/26/22 0114    dextrose 10 % infusion   Intravenous Continuous PRN Carola Buckley NP        dextrose 10% bolus 125 mL  12.5 g Intravenous PRN Caorla Buckley NP        dicyclomine capsule 10 mg  10 mg Oral QID PRN Suri Candelario MD   10 mg at 04/25/22 2053    enoxaparin injection 40 mg  40 mg Subcutaneous Daily Suri Candelario MD        fat emulsion 20% infusion 250 mL  250 mL Intravenous Daily Joseph Potter MD 20.8 mL/hr at 04/25/22 2214 250 mL at 04/25/22 2214    fat emulsion 20% infusion 250 mL  250 mL Intravenous Daily Suri Candelario MD        glucagon (human recombinant) injection 1 mg  1 mg Intramuscular PRN Carola Buckley NP        insulin aspart U-100 pen 1-10 Units  1-10 Units Subcutaneous Q4H PRN Carola Buckley NP        metronidazole IVPB 500 mg  500 mg Intravenous Q8H Carola Buckley NP   Stopped at 04/26/22 0720    mupirocin 2 % ointment   Nasal BID Joseph Potter MD   Given at 04/25/22 2055    naloxone 0.4 mg/mL injection 0.02 mg  0.02 mg Intravenous PRN Suri Candelario MD        ondansetron injection 4 mg  4 mg Intravenous Q8H PRN Suri Candelario MD   4 mg at 04/25/22 0904    pantoprazole EC tablet 40 mg  40 mg Oral Daily Suri Candelario MD   40 mg at 04/25/22 0913    prasugreL tablet 10 mg  10 mg Oral Daily Suri Candelario MD   10 mg at 04/25/22 0914    prochlorperazine injection Soln 5 mg  5 mg Intravenous Q6H PRN Suri Candelario MD   5 mg at 04/25/22 2054    sodium chloride 0.9% flush 10 mL  10 mL Intravenous PRN Suri Candelario MD        sodium chloride 0.9% flush 10 mL  10 mL Intravenous Q6H Joseph Potter MD   10 mL at 04/26/22 0000    And    sodium chloride 0.9% flush 10 mL  10 mL Intravenous PRN Joseph Potter MD        TPN ADULT CENTRAL LINE CUSTOM   Intravenous Continuous Joseph Potter MD 38 mL/hr at 04/25/22 2222 New Bag at 04/25/22 2222    TPN ADULT CENTRAL LINE CUSTOM   Intravenous Continuous Suri Candelario MD         traMADoL tablet 50 mg  50 mg Oral Q6H PRN Atif Rasmussen MD   50 mg at 04/25/22 2053     Current Discharge Medication List      CONTINUE these medications which have NOT CHANGED    Details   acetaminophen (TYLENOL) 500 MG tablet Take 2 tablets (1,000 mg total) by mouth every 8 (eight) hours as needed for Pain.  Refills: 0      albuterol (PROVENTIL/VENTOLIN HFA) 90 mcg/actuation inhaler Inhale 2 puffs into the lungs every 6 (six) hours as needed for Wheezing. Rescue  Qty: 18 g, Refills: 1    Associated Diagnoses: Bronchitis      ALPRAZolam (XANAX) 0.5 MG tablet Take 1 tablet (0.5 mg total) by mouth nightly as needed for Anxiety.  Qty: 30 tablet, Refills: 2    Associated Diagnoses: Anxiety      aspirin (ECOTRIN) 81 MG EC tablet Take 1 tablet (81 mg total) by mouth once daily.  Qty: 90 tablet, Refills: 0      carvediloL (COREG) 12.5 MG tablet Take 1 tablet (12.5 mg total) by mouth 2 (two) times daily with meals.  Qty: 180 tablet, Refills: 3    Comments: .      cefdinir (OMNICEF) 125 mg/5 mL suspension Take 12 mLs (300 mg total) by mouth every 12 (twelve) hours.  Qty: 720 mL, Refills: 0      dicyclomine (BENTYL) 10 MG capsule Take 1 capsule (10 mg total) by mouth 4 (four) times daily as needed.  Qty: 120 capsule, Refills: 3      ezetimibe (ZETIA) 10 mg tablet Take 1 tablet (10 mg total) by mouth every evening.  Qty: 90 tablet, Refills: 3      fluticasone propionate (FLONASE) 50 mcg/actuation nasal spray 1 spray (50 mcg total) by Each Nostril route once daily.  Qty: 9.9 mL, Refills: 1    Associated Diagnoses: Sinusitis, unspecified chronicity, unspecified location      Lactobacillus rhamnosus GG (CULTURELLE) 10 billion cell capsule Take 1 capsule by mouth once daily.      metronidazole (FLAGYL) 50 mg/mL Syrg Take 10 mLs (500 mg total) by mouth every 8 (eight) hours.  Qty: 900 mL, Refills: 0      nitroGLYCERIN (NITROSTAT) 0.4 MG SL tablet Place 1 tablet (0.4 mg total) under the tongue every 5 (five) minutes as needed for  Chest pain.  Qty: 25 tablet, Refills: 0      omeprazole (PRILOSEC) 40 MG capsule Take 1 capsule (40 mg total) by mouth once daily.  Qty: 90 capsule, Refills: 2    Associated Diagnoses: Old MI (myocardial infarction); S/P PTCA (percutaneous transluminal coronary angioplasty); Overweight (BMI 25.0-29.9)      prasugreL (EFFIENT) 10 mg Tab Take 1 tablet (10 mg total) by mouth once daily.  Qty: 90 tablet, Refills: 0      promethazine (PROMETHEGAN) 25 MG suppository Place 1 suppository (25 mg total) rectally every 6 (six) hours as needed for Nausea.  Qty: 12 suppository, Refills: 2      simethicone (MYLICON) 80 MG chewable tablet Take 80 mg by mouth every 8 (eight) hours as needed.      spironolactone (ALDACTONE) 25 MG tablet Take 1 tablet (25 mg total) by mouth once daily.  Qty: 90 tablet, Refills: 3               I have seen and examined this patient within the last 30 days. My clinical findings that support the need for the home health skilled services and home bound status are the following:no   Weakness/numbness causing balance and gait disturbance due to Infection making it taxing to leave home.     Diet:   regular diet    Labs:  Cbc, cmp, prealb, triger, phos, magnesium weekly, fax results to Dr. Patel [phone]    Referrals/ Consults  Physical Therapy to evaluate and treat. Evaluate for home safety and equipment needs; Establish/upgrade home exercise program. Perform / instruct on therapeutic exercises, gait training, transfer training, and Range of Motion.  Occupational Therapy to evaluate and treat. Evaluate home environment for safety and equipment needs. Perform/Instruct on transfers, ADL training, ROM, and therapeutic exercises.   to evaluate for community resources/long-range planning.    Activities:   activity as tolerated    Nursing:   Agency to admit patient within 24 hours of hospital discharge unless specified on physician order or at patient request    SN to complete comprehensive  assessment including routine vital signs. Instruct on disease process and s/s of complications to report to MD. Review/verify medication list sent home with the patient at time of discharge  and instruct patient/caregiver as needed. Frequency may be adjusted depending on start of care date.     Skilled nurse to perform up to 3 visits PRN for symptoms related to diagnosis    Notify MD if SBP > 160 or < 90; DBP > 90 or < 50; HR > 120 or < 50; Temp > 101; O2 < 88%; Other:       Ok to schedule additional visits based on staff availability and patient request on consecutive days within the home health episode.    When multiple disciplines ordered:    Start of Care occurs on Sunday - Wednesday schedule remaining discipline evaluations as ordered on separate consecutive days following the start of care.    Thursday SOC -schedule subsequent evaluations Friday and Monday the following week.     Friday - Saturday SOC - schedule subsequent discipline evaluations on consecutive days starting Monday of the following week.    For all post-discharge communication and subsequent orders please contact patient's primary care physician. If unable to reach primary care physician or do not receive response within 30 minutes, please contact   Dr. Potter for clinical staff order clarification    Miscellaneous   Home Infusion Therapy:   SN to perform Infusion Therapy/Central Line Care.  Review Central Line Care & Central Line Flush with patient.    Administer (drug and dose): TPN at 38cc/hr    AA 15%  75 gms  Dextrose 70% 182 gms  NaCl (hypertonic) 60meq  NaPhos  50 meq  KCl  50 meq  Mag 2 gms  Calcium 1 gm  MVI 10cc   TE 1cc  250cc 20% intralipids daily  For 3 weeks    Maxapime 2 gms every 8 hours  IVPB for 3 weeks  Flagyl 500 mgm every 8 hours IVPB for 3 weeks  Scrub the Hub: Prior to accessing the line, always perform a 30 second alcohol scrub  Each lumen of the central line is to be flushed at least daily with 10 mL Normal Saline and  3 mL Heparin flush (10 units/mL)  Skilled Nurse (SN) may draw blood from IV access  Blood Draw Procedure:   - Aspirate at least 5 mL of blood   - Discard   - Obtain specimen   - Change injection cap   - Flush with 20 mL Normal Saline followed by a                 3-5 mL Heparin flush (10 units/mL)  Central :   - Sterile dressing changes are done weekly and as needed.   - Use chlor-hexadine scrub to cleanse site, apply Biopatch to insertion site,       apply securement device dressing   - Injection caps are changed weekly and after EVERY lab draw.   - If sterile gauze is under dressing to control oozing,                 dressing change must be performed every 24 hours until gauze is not needed.        I certify that this patient is confined to her home and needs intermittent skilled nursing care, physical therapy and occupational therapy.

## 2022-04-26 NOTE — PLAN OF CARE
04/26/22 1558   Post-Acute Status   Post-Acute Authorization Placement   Post-Acute Placement Status Referrals Sent   Pt attempted to learn home infusion, unable to do.   Pt amenable to facility placement. SW sent referral to [facility].    Cristina Kendall LMSW  [facility]  [id number]

## 2022-04-26 NOTE — SUBJECTIVE & OBJECTIVE
Subjective:     Interval History: no acute events overnite, eating small amt, pos for bm, no emesis, thinks she is feeling better    Post-Op Info:  * No surgery found *          Medications:  Continuous Infusions:   dextrose 10 % in water (D10W)      TPN ADULT CENTRAL LINE CUSTOM 38 mL/hr at 04/25/22 2222    TPN ADULT CENTRAL LINE CUSTOM       Scheduled Meds:   aspirin  81 mg Oral Daily    carvediloL  12.5 mg Oral BID WM    ceFEPime (MAXIPIME) IVPB  2 g Intravenous Q8H    enoxaparin  40 mg Subcutaneous Daily    fat emulsion 20%  250 mL Intravenous Daily    metronidazole  500 mg Intravenous Q8H    mupirocin   Nasal BID    pantoprazole  40 mg Oral Daily    prasugreL  10 mg Oral Daily    sodium chloride 0.9%  10 mL Intravenous Q6H     PRN Meds:   acetaminophen    albuterol    ALPRAZolam    dextrose 10 % in water (D10W)    dextrose 10%    dicyclomine    glucagon (human recombinant)    insulin aspart U-100    naloxone    ondansetron    prochlorperazine    sodium chloride 0.9%    sodium chloride 0.9%    traMADoL        Objective:     Vital Signs (Most Recent):  Temp: 97.3 °F (36.3 °C) (04/26/22 0712)  Pulse: 74 (04/26/22 0722)  Resp: 16 (04/26/22 0912)  BP: 110/60 (04/26/22 0712)  SpO2: 97 % (04/26/22 0712) Vital Signs (24h Range):  Temp:  [96.3 °F (35.7 °C)-98.1 °F (36.7 °C)] 97.3 °F (36.3 °C)  Pulse:  [68-85] 74  Resp:  [15-20] 16  SpO2:  [95 %-99 %] 97 %  BP: ()/(48-60) 110/60     Intake/Output - Last 3 Shifts         04/24 0700 04/25 0659 04/25 0700 04/26 0659 04/26 0700 04/27 0659    P.O. 1440 550     IV Piggyback 398.7      TPN 1213.9      Total Intake(mL/kg) 3052.6 (57.5) 550 (10.4)     Urine (mL/kg/hr) 4 (0)      Emesis/NG output 1      Stool 3      Total Output 8      Net +3044.6 +550            Urine Occurrence 5 x 4 x     Stool Occurrence 1 x 4 x             Physical Exam  Constitutional:       Appearance: She is well-developed.   HENT:      Head: Normocephalic.   Eyes:      Pupils: Pupils are equal,  round, and reactive to light.   Cardiovascular:      Rate and Rhythm: Normal rate and regular rhythm.      Heart sounds: Normal heart sounds.   Pulmonary:      Effort: Pulmonary effort is normal. No respiratory distress.      Breath sounds: Normal breath sounds. No wheezing or rales.   Abdominal:      Palpations: Abdomen is soft. There is no mass.      Tenderness: There is no guarding or rebound.   Skin:     General: Skin is warm and dry.   Neurological:      Mental Status: She is alert and oriented to person, place, and time.   Psychiatric:         Behavior: Behavior normal.         Thought Content: Thought content normal.         Judgment: Judgment normal.           Significant Labs:  BMP (Last 3 Results):   Recent Labs   Lab 04/24/22  0611 04/25/22  0305 04/26/22  0332   *  118* 115* 106     136 134* 135*   K 3.5  3.5 3.9 5.2*   CL 98  98 97 101   CO2 31*  31* 28 28   BUN 17  17 18 16   CREATININE 0.6  0.6 0.6 0.6   CALCIUM 7.8*  7.8* 8.1* 8.4*   MG 2.0 1.8 1.9     CBC (Last 3 Results):   Recent Labs   Lab 04/24/22  0611 04/25/22  0305 04/26/22  0332   WBC 2.96* 3.93 5.01   RBC 4.20 3.94* 3.87*   HGB 11.6* 11.0* 10.8*   HCT 35.7* 34.7* 34.5*    235 241   MCV 85 88 89   MCH 27.6 27.9 27.9   MCHC 32.5 31.7* 31.3*       Significant Diagnostics:  None

## 2022-04-26 NOTE — ASSESSMENT & PLAN NOTE
Chronic, controlled.  Latest blood pressure and vitals reviewed-   Temp:  [96.3 °F (35.7 °C)-98.1 °F (36.7 °C)]   Pulse:  [68-85]   Resp:  [15-20]   BP: ()/(48-60)   SpO2:  [95 %-99 %] .   Home meds for hypertension were reviewed and noted below.   Hypertension Medications             carvediloL (COREG) 12.5 MG tablet Take 1 tablet (12.5 mg total) by mouth 2 (two) times daily with meals.    nitroGLYCERIN (NITROSTAT) 0.4 MG SL tablet Place 1 tablet (0.4 mg total) under the tongue every 5 (five) minutes as needed for Chest pain.    spironolactone (ALDACTONE) 25 MG tablet Take 1 tablet (25 mg total) by mouth once daily.          While in the hospital, will manage blood pressure as follows; Continue home antihypertensive regimen    Will utilize p.r.n. blood pressure medication only if patient's blood pressure greater than  180/110 and she develops symptoms such as worsening chest pain or shortness of breath.

## 2022-04-26 NOTE — PROGRESS NOTES
[facility] home infusion liaison met with patient in room to discuss home TPN and double abx. Patient states she does not believe she is capable of doing related to dexterity and anxiety. She is agreeable to placement in a facility.     Jennifer Vera RN, BSN, Liaison   [facility]   [phone]

## 2022-04-27 VITALS
WEIGHT: 117.06 LBS | OXYGEN SATURATION: 100 % | HEART RATE: 81 BPM | BODY MASS INDEX: 20.74 KG/M2 | TEMPERATURE: 97 F | SYSTOLIC BLOOD PRESSURE: 122 MMHG | RESPIRATION RATE: 16 BRPM | DIASTOLIC BLOOD PRESSURE: 58 MMHG | HEIGHT: 63 IN

## 2022-04-27 LAB
ANION GAP SERPL CALC-SCNC: 6 MMOL/L (ref 8–16)
ANISOCYTOSIS BLD QL SMEAR: SLIGHT
BASO STIPL BLD QL SMEAR: ABNORMAL
BASOPHILS # BLD AUTO: 0.07 K/UL (ref 0–0.2)
BASOPHILS NFR BLD: 2 % (ref 0–1.9)
BUN SERPL-MCNC: 15 MG/DL (ref 8–23)
CALCIUM SERPL-MCNC: 8.6 MG/DL (ref 8.7–10.5)
CHLORIDE SERPL-SCNC: 101 MMOL/L (ref 95–110)
CO2 SERPL-SCNC: 24 MMOL/L (ref 23–29)
CREAT SERPL-MCNC: 0.6 MG/DL (ref 0.5–1.4)
DIFFERENTIAL METHOD: ABNORMAL
EOSINOPHIL # BLD AUTO: 0.1 K/UL (ref 0–0.5)
EOSINOPHIL NFR BLD: 2 % (ref 0–8)
ERYTHROCYTE [DISTWIDTH] IN BLOOD BY AUTOMATED COUNT: 21.7 % (ref 11.5–14.5)
EST. GFR  (AFRICAN AMERICAN): >60 ML/MIN/1.73 M^2
EST. GFR  (NON AFRICAN AMERICAN): >60 ML/MIN/1.73 M^2
GLUCOSE SERPL-MCNC: 102 MG/DL (ref 70–110)
HCT VFR BLD AUTO: 34.4 % (ref 37–48.5)
HGB BLD-MCNC: 10.7 G/DL (ref 12–16)
IMM GRANULOCYTES # BLD AUTO: 0 K/UL (ref 0–0.04)
IMM GRANULOCYTES NFR BLD AUTO: 0 % (ref 0–0.5)
LYMPHOCYTES # BLD AUTO: 2.4 K/UL (ref 1–4.8)
LYMPHOCYTES NFR BLD: 68 % (ref 18–48)
MAGNESIUM SERPL-MCNC: 2 MG/DL (ref 1.6–2.6)
MCH RBC QN AUTO: 27.2 PG (ref 27–31)
MCHC RBC AUTO-ENTMCNC: 31.1 G/DL (ref 32–36)
MCV RBC AUTO: 88 FL (ref 82–98)
MONOCYTES # BLD AUTO: 0.6 K/UL (ref 0.3–1)
MONOCYTES NFR BLD: 16.6 % (ref 4–15)
NEUTROPHILS # BLD AUTO: 0.4 K/UL (ref 1.8–7.7)
NEUTROPHILS NFR BLD: 11.4 % (ref 38–73)
NRBC BLD-RTO: 0 /100 WBC
PHOSPHATE SERPL-MCNC: 3.5 MG/DL (ref 2.7–4.5)
PLATELET # BLD AUTO: 246 K/UL (ref 150–450)
PLATELET BLD QL SMEAR: ABNORMAL
PMV BLD AUTO: 10.8 FL (ref 9.2–12.9)
POCT GLUCOSE: 121 MG/DL (ref 70–110)
POCT GLUCOSE: 130 MG/DL (ref 70–110)
POLYCHROMASIA BLD QL SMEAR: ABNORMAL
POTASSIUM SERPL-SCNC: 5 MMOL/L (ref 3.5–5.1)
RBC # BLD AUTO: 3.93 M/UL (ref 4–5.4)
SODIUM SERPL-SCNC: 131 MMOL/L (ref 136–145)
WBC # BLD AUTO: 3.56 K/UL (ref 3.9–12.7)

## 2022-04-27 PROCEDURE — 99238 HOSP IP/OBS DSCHRG MGMT 30/<: CPT | Mod: ,,, | Performed by: NURSE PRACTITIONER

## 2022-04-27 PROCEDURE — 25000003 PHARM REV CODE 250

## 2022-04-27 PROCEDURE — 99238 PR HOSPITAL DISCHARGE DAY,<30 MIN: ICD-10-PCS | Mod: ,,, | Performed by: NURSE PRACTITIONER

## 2022-04-27 PROCEDURE — 84100 ASSAY OF PHOSPHORUS: CPT | Performed by: COLON & RECTAL SURGERY

## 2022-04-27 PROCEDURE — 25000003 PHARM REV CODE 250: Performed by: NURSE PRACTITIONER

## 2022-04-27 PROCEDURE — 80048 BASIC METABOLIC PNL TOTAL CA: CPT | Performed by: STUDENT IN AN ORGANIZED HEALTH CARE EDUCATION/TRAINING PROGRAM

## 2022-04-27 PROCEDURE — 83735 ASSAY OF MAGNESIUM: CPT | Performed by: COLON & RECTAL SURGERY

## 2022-04-27 PROCEDURE — 25000003 PHARM REV CODE 250: Performed by: COLON & RECTAL SURGERY

## 2022-04-27 PROCEDURE — 85025 COMPLETE CBC W/AUTO DIFF WBC: CPT | Performed by: STUDENT IN AN ORGANIZED HEALTH CARE EDUCATION/TRAINING PROGRAM

## 2022-04-27 PROCEDURE — A4216 STERILE WATER/SALINE, 10 ML: HCPCS | Performed by: COLON & RECTAL SURGERY

## 2022-04-27 PROCEDURE — 25000003 PHARM REV CODE 250: Performed by: STUDENT IN AN ORGANIZED HEALTH CARE EDUCATION/TRAINING PROGRAM

## 2022-04-27 PROCEDURE — 63600175 PHARM REV CODE 636 W HCPCS: Performed by: NURSE PRACTITIONER

## 2022-04-27 PROCEDURE — S0030 INJECTION, METRONIDAZOLE: HCPCS | Performed by: NURSE PRACTITIONER

## 2022-04-27 PROCEDURE — 63600175 PHARM REV CODE 636 W HCPCS: Performed by: STUDENT IN AN ORGANIZED HEALTH CARE EDUCATION/TRAINING PROGRAM

## 2022-04-27 RX ORDER — TALC
6 POWDER (GRAM) TOPICAL NIGHTLY PRN
Status: CANCELLED | OUTPATIENT
Start: 2022-04-27

## 2022-04-27 RX ORDER — PRASUGREL 10 MG/1
10 TABLET, FILM COATED ORAL DAILY
Status: CANCELLED | OUTPATIENT
Start: 2022-04-27

## 2022-04-27 RX ORDER — SODIUM CHLORIDE 0.9 % (FLUSH) 0.9 %
10 SYRINGE (ML) INJECTION
Status: CANCELLED | OUTPATIENT
Start: 2022-04-27

## 2022-04-27 RX ORDER — PROCHLORPERAZINE EDISYLATE 5 MG/ML
5 INJECTION INTRAMUSCULAR; INTRAVENOUS EVERY 6 HOURS PRN
Status: CANCELLED | OUTPATIENT
Start: 2022-04-27

## 2022-04-27 RX ORDER — ASPIRIN 81 MG/1
81 TABLET ORAL DAILY
Status: CANCELLED | OUTPATIENT
Start: 2022-04-28

## 2022-04-27 RX ORDER — PANTOPRAZOLE SODIUM 40 MG/1
40 TABLET, DELAYED RELEASE ORAL DAILY
Status: CANCELLED | OUTPATIENT
Start: 2022-04-28

## 2022-04-27 RX ORDER — CARVEDILOL 12.5 MG/1
12.5 TABLET ORAL 2 TIMES DAILY WITH MEALS
Status: CANCELLED | OUTPATIENT
Start: 2022-04-27

## 2022-04-27 RX ORDER — ALBUTEROL SULFATE 90 UG/1
2 AEROSOL, METERED RESPIRATORY (INHALATION) EVERY 6 HOURS PRN
Status: CANCELLED | OUTPATIENT
Start: 2022-04-27

## 2022-04-27 RX ORDER — NALOXONE HCL 0.4 MG/ML
0.02 VIAL (ML) INJECTION
Status: CANCELLED | OUTPATIENT
Start: 2022-04-27

## 2022-04-27 RX ORDER — SODIUM CHLORIDE 0.9 % (FLUSH) 0.9 %
10 SYRINGE (ML) INJECTION EVERY 6 HOURS
Status: CANCELLED | OUTPATIENT
Start: 2022-04-27

## 2022-04-27 RX ORDER — METRONIDAZOLE 500 MG/100ML
500 INJECTION, SOLUTION INTRAVENOUS
Status: CANCELLED | OUTPATIENT
Start: 2022-04-27

## 2022-04-27 RX ORDER — PROMETHAZINE HYDROCHLORIDE 12.5 MG/1
25 TABLET ORAL EVERY 6 HOURS PRN
Status: CANCELLED | OUTPATIENT
Start: 2022-04-27

## 2022-04-27 RX ORDER — ENOXAPARIN SODIUM 100 MG/ML
40 INJECTION SUBCUTANEOUS EVERY 24 HOURS
Status: CANCELLED | OUTPATIENT
Start: 2022-04-27

## 2022-04-27 RX ORDER — CEFEPIME HYDROCHLORIDE 1 G/50ML
2 INJECTION, SOLUTION INTRAVENOUS
Status: CANCELLED | OUTPATIENT
Start: 2022-04-27

## 2022-04-27 RX ORDER — ACETAMINOPHEN 325 MG/1
650 TABLET ORAL EVERY 4 HOURS PRN
Status: CANCELLED | OUTPATIENT
Start: 2022-04-27

## 2022-04-27 RX ORDER — MUPIROCIN 20 MG/G
OINTMENT TOPICAL 2 TIMES DAILY
Status: CANCELLED | OUTPATIENT
Start: 2022-04-27 | End: 2022-04-28

## 2022-04-27 RX ORDER — FAMOTIDINE 10 MG/ML
20 INJECTION INTRAVENOUS 2 TIMES DAILY
Status: CANCELLED | OUTPATIENT
Start: 2022-04-27

## 2022-04-27 RX ORDER — GLUCAGON 1 MG
1 KIT INJECTION
Status: CANCELLED | OUTPATIENT
Start: 2022-04-27

## 2022-04-27 RX ORDER — DICYCLOMINE HYDROCHLORIDE 10 MG/1
10 CAPSULE ORAL 4 TIMES DAILY PRN
Status: CANCELLED | OUTPATIENT
Start: 2022-04-27

## 2022-04-27 RX ORDER — ONDANSETRON 2 MG/ML
4 INJECTION INTRAMUSCULAR; INTRAVENOUS EVERY 8 HOURS PRN
Status: CANCELLED | OUTPATIENT
Start: 2022-04-27

## 2022-04-27 RX ORDER — INSULIN ASPART 100 [IU]/ML
1-10 INJECTION, SOLUTION INTRAVENOUS; SUBCUTANEOUS EVERY 4 HOURS PRN
Status: CANCELLED | OUTPATIENT
Start: 2022-04-27

## 2022-04-27 RX ORDER — TRAMADOL HYDROCHLORIDE 50 MG/1
50 TABLET ORAL EVERY 6 HOURS PRN
Status: CANCELLED | OUTPATIENT
Start: 2022-04-27

## 2022-04-27 RX ORDER — ALPRAZOLAM 0.5 MG/1
0.5 TABLET ORAL NIGHTLY PRN
Status: CANCELLED | OUTPATIENT
Start: 2022-04-27

## 2022-04-27 RX ORDER — DEXTROSE MONOHYDRATE 100 MG/ML
INJECTION, SOLUTION INTRAVENOUS CONTINUOUS PRN
Status: CANCELLED | OUTPATIENT
Start: 2022-04-27

## 2022-04-27 RX ADMIN — METRONIDAZOLE 500 MG: 500 INJECTION, SOLUTION INTRAVENOUS at 02:04

## 2022-04-27 RX ADMIN — Medication 10 ML: at 11:04

## 2022-04-27 RX ADMIN — METRONIDAZOLE 500 MG: 500 INJECTION, SOLUTION INTRAVENOUS at 06:04

## 2022-04-27 RX ADMIN — Medication 10 ML: at 06:04

## 2022-04-27 RX ADMIN — ASPIRIN 81 MG: 81 TABLET, COATED ORAL at 08:04

## 2022-04-27 RX ADMIN — PROCHLORPERAZINE EDISYLATE 5 MG: 5 INJECTION INTRAMUSCULAR; INTRAVENOUS at 12:04

## 2022-04-27 RX ADMIN — PRASUGREL 10 MG: 10 TABLET, FILM COATED ORAL at 08:04

## 2022-04-27 RX ADMIN — TRAMADOL HYDROCHLORIDE 50 MG: 50 TABLET, COATED ORAL at 12:04

## 2022-04-27 RX ADMIN — CEFEPIME 2 G: 2 INJECTION, POWDER, FOR SOLUTION INTRAVENOUS at 08:04

## 2022-04-27 RX ADMIN — ONDANSETRON 4 MG: 2 INJECTION INTRAMUSCULAR; INTRAVENOUS at 08:04

## 2022-04-27 RX ADMIN — MUPIROCIN: 20 OINTMENT TOPICAL at 08:04

## 2022-04-27 RX ADMIN — Medication 10 ML: at 12:04

## 2022-04-27 RX ADMIN — CEFEPIME 2 G: 2 INJECTION, POWDER, FOR SOLUTION INTRAVENOUS at 12:04

## 2022-04-27 RX ADMIN — PANTOPRAZOLE SODIUM 40 MG: 40 TABLET, DELAYED RELEASE ORAL at 08:04

## 2022-04-27 NOTE — PROGRESS NOTES
[facility]  Colorectal Surgery  Progress Note    Patient Name: Odette Hart  MRN: [id number]  Admission Date: 4/20/2022  Hospital Length of Stay: 7 days  Attending Physician: Joseph Potter MD    Subjective:     Interval History: no acute events overnite, feeling very anxious about poss discharge home with tpn, would like to consider short term placement until surgery, not eating much, abd pain improved    Post-Op Info:  * No surgery found *          Medications:  Continuous Infusions:   dextrose 10 % in water (D10W)      TPN ADULT CENTRAL LINE CUSTOM 38 mL/hr at 04/26/22 2213    TPN ADULT CENTRAL LINE CUSTOM       Scheduled Meds:   aspirin  81 mg Oral Daily    carvediloL  12.5 mg Oral BID WM    ceFEPime (MAXIPIME) IVPB  2 g Intravenous Q8H    enoxaparin  40 mg Subcutaneous Daily    fat emulsion 20%  250 mL Intravenous Daily    metronidazole  500 mg Intravenous Q8H    mupirocin   Nasal BID    pantoprazole  40 mg Oral Daily    prasugreL  10 mg Oral Daily    sodium chloride 0.9%  10 mL Intravenous Q6H     PRN Meds:   acetaminophen    albuterol    ALPRAZolam    dextrose 10 % in water (D10W)    dextrose 10%    dicyclomine    glucagon (human recombinant)    insulin aspart U-100    naloxone    ondansetron    prochlorperazine    sodium chloride 0.9%    sodium chloride 0.9%    traMADoL        Objective:     Vital Signs (Most Recent):  Temp: 96.1 °F (35.6 °C) (04/27/22 0759)  Pulse: 73 (04/27/22 0759)  Resp: 16 (04/27/22 0759)  BP: (!) 107/53 (04/27/22 0759)  SpO2: 100 % (04/27/22 0759)   Vital Signs (24h Range):  Temp:  [96.1 °F (35.6 °C)-98 °F (36.7 °C)] 96.1 °F (35.6 °C)  Pulse:  [69-86] 73  Resp:  [15-20] 16  SpO2:  [94 %-100 %] 100 %  BP: (102-121)/(51-62) 107/53     Intake/Output - Last 3 Shifts         04/25 0700  04/26 0659 04/26 0700 04/27 0659 04/27 0700 04/28 0659    P.O. 550 550     IV Piggyback       TPN       Total Intake(mL/kg) 550 (10.4) 550 (10.4)     Urine  (mL/kg/hr)       Emesis/NG output       Stool       Total Output       Net +550 +550            Urine Occurrence 4 x 3 x     Stool Occurrence 4 x 1 x             Physical Exam  Constitutional:       Appearance: She is well-developed.   HENT:      Head: Normocephalic.   Eyes:      Pupils: Pupils are equal, round, and reactive to light.   Cardiovascular:      Rate and Rhythm: Normal rate and regular rhythm.      Heart sounds: Normal heart sounds.   Pulmonary:      Effort: Pulmonary effort is normal. No respiratory distress.      Breath sounds: Normal breath sounds. No wheezing or rales.   Abdominal:      Palpations: Abdomen is soft. There is no mass.      Tenderness: There is no guarding or rebound.   Skin:     General: Skin is warm and dry.   Neurological:      Mental Status: She is alert and oriented to person, place, and time.   Psychiatric:         Behavior: Behavior normal.         Thought Content: Thought content normal.         Judgment: Judgment normal.           Significant Labs:  BMP (Last 3 Results):   Recent Labs   Lab 04/25/22 0305 04/26/22 0332 04/27/22  0148   * 106 102   * 135* 131*   K 3.9 5.2* 5.0   CL 97 101 101   CO2 28 28 24   BUN 18 16 15   CREATININE 0.6 0.6 0.6   CALCIUM 8.1* 8.4* 8.6*   MG 1.8 1.9 2.0     CBC (Last 3 Results):   Recent Labs   Lab 04/25/22 0305 04/26/22 0332 04/27/22  0148   WBC 3.93 5.01 3.56*   RBC 3.94* 3.87* 3.93*   HGB 11.0* 10.8* 10.7*   HCT 34.7* 34.5* 34.4*    241 246   MCV 88 89 88   MCH 27.9 27.9 27.2   MCHC 31.7* 31.3* 31.1*       Significant Diagnostics:  None    Assessment/Plan:     * Colovesical fistula  Admitted from home with known colovesical fistula    -cont maxipime and flagyl  -monitor I&O  -monitor vs  -surgical planning  - Renew TPN  - Continue regular diet, encouraged patient to try boost over ice    Acute cystitis without hematuria  Urine pos for pseudomonas and enterobacteroid, sensiitve to maxapime and flagyl      Severe  malnutrition  Nutrition consulted. Most recent weight and BMI monitored-     Malnutrition Type and Energy Intake  Malnutrition Type: chronic illness    Malnutrition (Moderate to Severe)  Weight Loss (Malnutrition):  (14% x 3 months per chart review)  Energy Intake (Malnutrition): less than 75% for greater than or equal to 1 month (susected <75% x 3 months given wt loss)       PICC-TPN        Measurements:  Wt Readings from Last 1 Encounters:   04/20/22 53.1 kg (117 lb 1 oz)   Body mass index is 20.74 kg/m².    Recommendations: Recommendation/Intervention: 1.  Goals: receive nutrition by RD follow-up    Patient has been screened and assessed by RD. RD will follow patient.    TPN/lipids concentrated and running at low rate given malnutrition/CHF  Consult social service for home tpn and IV antibiotics for 2-3 weeks, seen for teaching for home TPN, no support at home and having difficulty with tubing and machine, will continue to evaulate, pt now would like to go to a LTC until surgery      Chronic combined systolic and diastolic congestive heart failure  Patient is identified as having Combined Systolic and Diastolic heart failure that is Chronic. CHF is currently controlled. Latest ECHO performed and demonstrates- Results for orders placed during the hospital encounter of 01/22/22    Echo Saline Bubble? No    Interpretation Summary  · The left ventricle is severely enlarged with mild eccentric hypertrophy and severely decreased systolic function.  · There are segmental left ventricular wall motion abnormalities. The mid anteroseptal and anterior walls are akinetic. The apex is akinetic. The remainder of the left ventricular walls are severely hypokinetic.  · The estimated ejection fraction is 20%.  · Spontaneous echocardiographic contrast in the left ventricle.  · Grade III left ventricular diastolic dysfunction.  · Severe left atrial enlargement.  · Normal right ventricular size with normal right ventricular systolic  function.  · Mild right atrial enlargement.  · Mild aortic regurgitation.  · Mild-to-moderate mitral regurgitation.  · Mild tricuspid regurgitation.  · The estimated PA systolic pressure is 28 mmHg.  · Intermediate central venous pressure (8 mmHg).  · Pacemaker/ICD lead.  Cont home meds  Monitor weight and vs  ..      Primary hypertension  Chronic, controlled.  Latest blood pressure and vitals reviewed-   Temp:  [96.1 °F (35.6 °C)-98 °F (36.7 °C)]   Pulse:  [69-86]   Resp:  [15-20]   BP: (102-121)/(51-62)   SpO2:  [94 %-100 %] .   Home meds for hypertension were reviewed and noted below.   Hypertension Medications             carvediloL (COREG) 12.5 MG tablet Take 1 tablet (12.5 mg total) by mouth 2 (two) times daily with meals.    nitroGLYCERIN (NITROSTAT) 0.4 MG SL tablet Place 1 tablet (0.4 mg total) under the tongue every 5 (five) minutes as needed for Chest pain.    spironolactone (ALDACTONE) 25 MG tablet Take 1 tablet (25 mg total) by mouth once daily.          While in the hospital, will manage blood pressure as follows; Continue home antihypertensive regimen    Will utilize p.r.n. blood pressure medication only if patient's blood pressure greater than  180/110 and she develops symptoms such as worsening chest pain or shortness of breath.      Coronary artery disease of native artery of native heart with stable angina pectoris  Tele  Cont home meds  Monitor vs    Acute diverticulitis  CT imaging reviewed with IR, no benefit from aspiration/drainage at this time      History of coronary artery stent placement  Continue home meds  tele    Automatic implantable cardioverter-defibrillator in situ  Tele  Cont home meds  Monitor vs    Ischemic cardiomyopathy  Cont home meds  tele          Carola Buckley NP  Colorectal Surgery  [facility]

## 2022-04-27 NOTE — PROGRESS NOTES
Discharge Note:  Pt AVS reviewed at bedside, pt states understanding of plan of care and transition to [facility].  VSS, TPN and IV Fluids stopped, lines flushed.  Pt belongings with her, WC mode of transportation to [place].  Report called to Nurse Martel for pt going to room 215 at [facility].

## 2022-04-27 NOTE — ASSESSMENT & PLAN NOTE
Nutrition consulted. Most recent weight and BMI monitored-     Malnutrition Type and Energy Intake  Malnutrition Type: chronic illness    Malnutrition (Moderate to Severe)  Weight Loss (Malnutrition):  (14% x 3 months per chart review)  Energy Intake (Malnutrition): less than 75% for greater than or equal to 1 month (susected <75% x 3 months given wt loss)       PICC-TPN        Measurements:  Wt Readings from Last 1 Encounters:   04/20/22 53.1 kg (117 lb 1 oz)   Body mass index is 20.74 kg/m².    Recommendations: Recommendation/Intervention: 1.  Goals: receive nutrition by RD follow-up    Patient has been screened and assessed by RD. RD will follow patient.    TPN/lipids concentrated and running at low rate given malnutrition/CHF  Consult social service for home tpn and IV antibiotics for 2-3 weeks, seen for teaching for home TPN, no support at home and having difficulty with tubing and machine, will continue to evaulate, pt now would like to go to a LTC until surgery

## 2022-04-27 NOTE — ASSESSMENT & PLAN NOTE
Chronic, controlled.  Latest blood pressure and vitals reviewed-   Temp:  [96.1 °F (35.6 °C)-98 °F (36.7 °C)]   Pulse:  [69-86]   Resp:  [15-20]   BP: ()/(46-58)   SpO2:  [94 %-100 %] .   Home meds for hypertension were reviewed and noted below.   Hypertension Medications             carvediloL (COREG) 12.5 MG tablet Take 1 tablet (12.5 mg total) by mouth 2 (two) times daily with meals.    nitroGLYCERIN (NITROSTAT) 0.4 MG SL tablet Place 1 tablet (0.4 mg total) under the tongue every 5 (five) minutes as needed for Chest pain.    spironolactone (ALDACTONE) 25 MG tablet Take 1 tablet (25 mg total) by mouth once daily.          While in the hospital, will manage blood pressure as follows; Continue home antihypertensive regimen    Will utilize p.r.n. blood pressure medication only if patient's blood pressure greater than  180/110 and she develops symptoms such as worsening chest pain or shortness of breath.

## 2022-04-27 NOTE — SUBJECTIVE & OBJECTIVE
Subjective:     Interval History: no acute events overnite, feeling very anxious about poss discharge home with tpn, would like to consider short term placement until surgery, not eating much, abd pain improved    Post-Op Info:  * No surgery found *          Medications:  Continuous Infusions:   dextrose 10 % in water (D10W)      TPN ADULT CENTRAL LINE CUSTOM 38 mL/hr at 04/26/22 2213    TPN ADULT CENTRAL LINE CUSTOM       Scheduled Meds:   aspirin  81 mg Oral Daily    carvediloL  12.5 mg Oral BID WM    ceFEPime (MAXIPIME) IVPB  2 g Intravenous Q8H    enoxaparin  40 mg Subcutaneous Daily    fat emulsion 20%  250 mL Intravenous Daily    metronidazole  500 mg Intravenous Q8H    mupirocin   Nasal BID    pantoprazole  40 mg Oral Daily    prasugreL  10 mg Oral Daily    sodium chloride 0.9%  10 mL Intravenous Q6H     PRN Meds:   acetaminophen    albuterol    ALPRAZolam    dextrose 10 % in water (D10W)    dextrose 10%    dicyclomine    glucagon (human recombinant)    insulin aspart U-100    naloxone    ondansetron    prochlorperazine    sodium chloride 0.9%    sodium chloride 0.9%    traMADoL        Objective:     Vital Signs (Most Recent):  Temp: 96.1 °F (35.6 °C) (04/27/22 0759)  Pulse: 73 (04/27/22 0759)  Resp: 16 (04/27/22 0759)  BP: (!) 107/53 (04/27/22 0759)  SpO2: 100 % (04/27/22 0759)   Vital Signs (24h Range):  Temp:  [96.1 °F (35.6 °C)-98 °F (36.7 °C)] 96.1 °F (35.6 °C)  Pulse:  [69-86] 73  Resp:  [15-20] 16  SpO2:  [94 %-100 %] 100 %  BP: (102-121)/(51-62) 107/53     Intake/Output - Last 3 Shifts         04/25 0700 04/26 0659 04/26 0700 04/27 0659 04/27 0700  04/28 0659    P.O. 550 550     IV Piggyback       TPN       Total Intake(mL/kg) 550 (10.4) 550 (10.4)     Urine (mL/kg/hr)       Emesis/NG output       Stool       Total Output       Net +550 +550            Urine Occurrence 4 x 3 x     Stool Occurrence 4 x 1 x             Physical Exam  Constitutional:       Appearance: She is well-developed.   HENT:       Head: Normocephalic.   Eyes:      Pupils: Pupils are equal, round, and reactive to light.   Cardiovascular:      Rate and Rhythm: Normal rate and regular rhythm.      Heart sounds: Normal heart sounds.   Pulmonary:      Effort: Pulmonary effort is normal. No respiratory distress.      Breath sounds: Normal breath sounds. No wheezing or rales.   Abdominal:      Palpations: Abdomen is soft. There is no mass.      Tenderness: There is no guarding or rebound.   Skin:     General: Skin is warm and dry.   Neurological:      Mental Status: She is alert and oriented to person, place, and time.   Psychiatric:         Behavior: Behavior normal.         Thought Content: Thought content normal.         Judgment: Judgment normal.           Significant Labs:  BMP (Last 3 Results):   Recent Labs   Lab 04/25/22 0305 04/26/22 0332 04/27/22 0148   * 106 102   * 135* 131*   K 3.9 5.2* 5.0   CL 97 101 101   CO2 28 28 24   BUN 18 16 15   CREATININE 0.6 0.6 0.6   CALCIUM 8.1* 8.4* 8.6*   MG 1.8 1.9 2.0     CBC (Last 3 Results):   Recent Labs   Lab 04/25/22 0305 04/26/22 0332 04/27/22  0148   WBC 3.93 5.01 3.56*   RBC 3.94* 3.87* 3.93*   HGB 11.0* 10.8* 10.7*   HCT 34.7* 34.5* 34.4*    241 246   MCV 88 89 88   MCH 27.9 27.9 27.2   MCHC 31.7* 31.3* 31.1*       Significant Diagnostics:  None

## 2022-04-27 NOTE — DISCHARGE SUMMARY
[facility]  Colorectal Surgery  Discharge Summary      Patient Name: Odette Hart  MRN: [id number]  Admission Date: 4/20/2022  Hospital Length of Stay: 7 days  Discharge Date and Time: 4/27/2022  Attending Physician: Joseph Potter MD   Discharging Provider: Carola Buckley NP  Primary Care Provider: Braxton Barragan MD     HPI:  No notes on file    * No surgery found *     Hospital Course:  66F with complicated cardiac history including CAD s/p PCI (1/24/22) on dual antiplatelet therapy, CHF (EF 20%), HTN, ICD, and complicated diverticulitis with colovesical fistula. She describes fecaluria which is getting progressively worse. She also notes night sweats, poor PO tolerance, nausea, weight loss, and abdominal pain. She tries to take her medication but often feels like things are stuck in her throat and and vomits the pills up. She has not been taking her prescribed antibiotics. She overall feels weak and like she is having trouble managing her health conditions. She denies CP or SOB. She has no new LLQ abdominal pain. She has never had a colonoscopy as she was inpatient at the time her previous scope was scheduled. She presents today from Urology clinic for evaluation and hospital admission.  CT:   Rim enhancing abscess extending from the sigmoid colon to the rectum and left posterolateral vaginal cuff with a suspected underlying colorectal fistula.  No definite intravaginal air to suggest a colovaginal fistula.     Colovesical fistula communicating the anterior sigmoid colon with the left lateral bladder dome.     Evolving CT findings of acute on chronic sigmoid colon diverticulitis    Admiited thru ER, placed on JUSTYNA, PICC line placed for TPN due to low albumin.  She slowly improved, JUSTYNA changed once sensivity results on urine.  Cards consulted for sugical clearance:  67 YO woman with underlying ischemic cardiomyopathy and recent deployment of drug-eluting stents, now for intraperitoneal surgery.        Non-obstructive disease remained after stent deployment.         History and physical examination suggest that her heart failure currently is decompensated.         Primary risks at surgery stem from (1) volume overload, (2) EF 20%, (3) ischemic heart disease, and (4) perioperative interruption of dual antiplatelet therapy following recent HEVER deployment.         Volume overload is only modifiable risk.         Risk of interruption of prasugrel is acceptable after 90 days of therapy.         Would:   · Begin IV diuresis and continue until euvolemic   · If anticipating surgery this admission, hold prasugrel for 7 days prior to operation.    · Restart prasugrel once post-operative bleeding risk acceptable (per surgical team.)   · Undertake emergent surgery without delay  if necessary.         Once CHF is treated, RCRI class IV, indicating a 15 % chance of   30-day risk of death, MI, or cardiac arrest.  By AHA guidelines algorithm, she is a high risk candidate for high risk surgery.       She expressed an interest in going home before surgery but she became very anxious about the home TPN and antibiotics.  Consult to LTC and pt waws stable enough for transfer to LTC to continue diet, TPN and antibiotics with plan once albumin normalized surgery would be planned.         Goals of Care Treatment Preferences:  Code Status: Full Code      Consults (From admission, onward)        Status Ordering Provider     Inpatient consult to Urogynecology  Once        Provider:  Jake Irvin MD    Completed UDAY CARMONA     Inpatient consult to Social Work  Once        Provider:  (Not yet assigned)    Acknowledged DAVIS DESOUZA     Inpatient consult to Registered Dietitian/Nutritionist  Once        Provider:  (Not yet assigned)    Completed MATT REINOSO     Inpatient consult to Cardiology  Once        Provider:  (Not yet assigned)    Completed THOMAS DEAN     Inpatient consult to Interventional Radiology   Once        Provider:  (Not yet assigned)    Completed THOMAS DEAN     [facility]  Once        Provider:  (Not yet assigned)    Completed THOMAS DEAN          Significant Diagnostic Studies: Labs:   BMP:   Recent Labs   Lab 04/26/22 0332 04/27/22 0148    102   * 131*   K 5.2* 5.0    101   CO2 28 24   BUN 16 15   CREATININE 0.6 0.6   CALCIUM 8.4* 8.6*   MG 1.9 2.0    and CBC   Recent Labs   Lab 04/26/22 0332 04/27/22 0148   WBC 5.01 3.56*   HGB 10.8* 10.7*   HCT 34.5* 34.4*    246       Pending Diagnostic Studies:     Procedure Component Value Units Date/Time    Basic Metabolic Panel [[id number]] Collected: 04/24/22 0611    Order Status: Sent Lab Status: In process Updated: 04/24/22 0611    Specimen: Blood     CBC auto differential [[id number]] Collected: 04/24/22 0611    Order Status: Sent Lab Status: In process Updated: 04/24/22 0611    Specimen: Blood     Magnesium [[id number]] Collected: 04/24/22 0611    Order Status: Sent Lab Status: In process Updated: 04/24/22 0611    Specimen: Blood     Phosphorus [[id number]] Collected: 04/24/22 0611    Order Status: Sent Lab Status: In process Updated: 04/24/22 0611    Specimen: Blood     Urinalysis, Reflex to Urine Culture Urine, Clean Catch [[id number]] Collected: 04/20/22 1919    Order Status: Sent Lab Status: In process Updated: 04/20/22 1920    Specimen: Urine         Final Active Diagnoses:    Diagnosis Date Noted POA    PRINCIPAL PROBLEM:  Colovesical fistula [N32.1] 02/26/2022 Yes    Acute cystitis without hematuria [N30.00] 04/26/2022 Yes    At risk for surgical complication [Z91.89] 04/22/2022 Not Applicable    Severe malnutrition [E43] 04/21/2022 Yes    Chronic combined systolic and diastolic congestive heart failure [I50.42] 02/17/2022 Yes    Primary hypertension [I10] 01/24/2022 Yes    Former smoker [Z87.891] 01/24/2022 Not Applicable    Normocytic anemia [D64.9] 01/23/2022 Yes    Familial  hypercholesterolemia [E78.01] 01/22/2022 Yes    Coronary artery disease of native artery of native heart with stable angina pectoris [I25.118] 04/19/2021 Yes    Acute diverticulitis [K57.92] 01/12/2021 Yes    RBBB with left anterior fascicular block [I45.2] 04/15/2019 Yes    Automatic implantable cardioverter-defibrillator in situ [Z95.810] 02/04/2019 Yes    History of coronary artery stent placement [Z95.5] 02/04/2019 Not Applicable    Ischemic cardiomyopathy [I25.5] 02/04/2019 Yes      Problems Resolved During this Admission:      Discharged Condition: good    Disposition: Long Term Care    Follow Up:    Patient Instructions:   No discharge procedures on file.  Medications:  Reconciled Home Medications:      Medication List      STOP taking these medications    cefdinir 125 mg/5 mL suspension  Commonly known as: OMNICEF        ASK your doctor about these medications    acetaminophen 500 MG tablet  Commonly known as: TYLENOL  Take 2 tablets (1,000 mg total) by mouth every 8 (eight) hours as needed for Pain.     albuterol 90 mcg/actuation inhaler  Commonly known as: PROVENTIL/VENTOLIN HFA  Inhale 2 puffs into the lungs every 6 (six) hours as needed for Wheezing. Rescue     ALPRAZolam 0.5 MG tablet  Commonly known as: XANAX  Take 1 tablet (0.5 mg total) by mouth nightly as needed for Anxiety.     aspirin 81 MG EC tablet  Commonly known as: ECOTRIN  Take 1 tablet (81 mg total) by mouth once daily.     carvediloL 12.5 MG tablet  Commonly known as: COREG  Take 1 tablet (12.5 mg total) by mouth 2 (two) times daily with meals.     dicyclomine 10 MG capsule  Commonly known as: BENTYL  Take 1 capsule (10 mg total) by mouth 4 (four) times daily as needed.     ezetimibe 10 mg tablet  Commonly known as: ZETIA  Take 1 tablet (10 mg total) by mouth every evening.     fluticasone propionate 50 mcg/actuation nasal spray  Commonly known as: FLONASE  1 spray (50 mcg total) by Each Nostril route once daily.     Lactobacillus  rhamnosus GG 10 billion cell capsule  Commonly known as: CULTURELLE  Take 1 capsule by mouth once daily.     metronidazole 50 mg/mL Syrg  Commonly known as: FLAGYL  Take 10 mLs (500 mg total) by mouth every 8 (eight) hours.     nitroGLYCERIN 0.4 MG SL tablet  Commonly known as: NITROSTAT  Place 1 tablet (0.4 mg total) under the tongue every 5 (five) minutes as needed for Chest pain.     omeprazole 40 MG capsule  Commonly known as: PRILOSEC  Take 1 capsule (40 mg total) by mouth once daily.     prasugreL 10 mg Tab  Commonly known as: EFFIENT  Take 1 tablet (10 mg total) by mouth once daily.     promethazine 25 MG suppository  Commonly known as: PHENERGAN  Place 1 suppository (25 mg total) rectally every 6 (six) hours as needed for Nausea.     simethicone 80 MG chewable tablet  Commonly known as: MYLICON  Take 80 mg by mouth every 8 (eight) hours as needed.     spironolactone 25 MG tablet  Commonly known as: ALDACTONE  Take 1 tablet (25 mg total) by mouth once daily.            Carola Buckley NP  Colorectal Surgery  [facility]

## 2022-04-27 NOTE — PLAN OF CARE
POC discussed with patient- she is agreeable. TPN infusion continues. Seen and assessed by GYN. Analgesia given for pain

## 2022-04-27 NOTE — HOSPITAL COURSE
66F with complicated cardiac history including CAD s/p PCI (1/24/22) on dual antiplatelet therapy, CHF (EF 20%), HTN, ICD, and complicated diverticulitis with colovesical fistula. She describes fecaluria which is getting progressively worse. She also notes night sweats, poor PO tolerance, nausea, weight loss, and abdominal pain. She tries to take her medication but often feels like things are stuck in her throat and and vomits the pills up. She has not been taking her prescribed antibiotics. She overall feels weak and like she is having trouble managing her health conditions. She denies CP or SOB. She has no new LLQ abdominal pain. She has never had a colonoscopy as she was inpatient at the time her previous scope was scheduled. She presents today from Urology clinic for evaluation and hospital admission.  CT:   Rim enhancing abscess extending from the sigmoid colon to the rectum and left posterolateral vaginal cuff with a suspected underlying colorectal fistula.  No definite intravaginal air to suggest a colovaginal fistula.     Colovesical fistula communicating the anterior sigmoid colon with the left lateral bladder dome.     Evolving CT findings of acute on chronic sigmoid colon diverticulitis    Admiited thru ER, placed on JUSTYNA, PICC line placed for TPN due to low albumin.  She slowly improved, JUSTYNA changed once sensivity results on urine.  Cards consulted for sugical clearance:  65 YO woman with underlying ischemic cardiomyopathy and recent deployment of drug-eluting stents, now for intraperitoneal surgery.       Non-obstructive disease remained after stent deployment.         History and physical examination suggest that her heart failure currently is decompensated.         Primary risks at surgery stem from (1) volume overload, (2) EF 20%, (3) ischemic heart disease, and (4) perioperative interruption of dual antiplatelet therapy following recent HEVER deployment.         Volume overload is only modifiable risk.          Risk of interruption of prasugrel is acceptable after 90 days of therapy.         Would:   Begin IV diuresis and continue until euvolemic   If anticipating surgery this admission, hold prasugrel for 7 days prior to operation.    Restart prasugrel once post-operative bleeding risk acceptable (per surgical team.)   Undertake emergent surgery without delay  if necessary.         Once CHF is treated, RCRI class IV, indicating a 15 % chance of   30-day risk of death, MI, or cardiac arrest.  By AHA guidelines algorithm, she is a high risk candidate for high risk surgery.       She expressed an interest in going home before surgery but she became very anxious about the home TPN and antibiotics.  Consult to LTC and pt waws stable enough for transfer to LTC to continue diet, TPN and antibiotics with plan once albumin normalized surgery would be planned.

## 2022-04-27 NOTE — ASSESSMENT & PLAN NOTE
Chronic, controlled.  Latest blood pressure and vitals reviewed-   Temp:  [96.1 °F (35.6 °C)-98 °F (36.7 °C)]   Pulse:  [69-86]   Resp:  [15-20]   BP: (102-121)/(51-62)   SpO2:  [94 %-100 %] .   Home meds for hypertension were reviewed and noted below.   Hypertension Medications             carvediloL (COREG) 12.5 MG tablet Take 1 tablet (12.5 mg total) by mouth 2 (two) times daily with meals.    nitroGLYCERIN (NITROSTAT) 0.4 MG SL tablet Place 1 tablet (0.4 mg total) under the tongue every 5 (five) minutes as needed for Chest pain.    spironolactone (ALDACTONE) 25 MG tablet Take 1 tablet (25 mg total) by mouth once daily.          While in the hospital, will manage blood pressure as follows; Continue home antihypertensive regimen    Will utilize p.r.n. blood pressure medication only if patient's blood pressure greater than  180/110 and she develops symptoms such as worsening chest pain or shortness of breath.

## 2022-04-27 NOTE — CONSULTS
"[facility]  [address]  [facility] [address]  [phone]    Odette Hart  [id number]  1955 April 26, 2022    Consulting Physician: Joseph Potter MD   GYN: near ?  Primary M.D.: Braxton Barragan MD    CC: Consult for colovesical fistula    HPI:     66F with complicated cardiac history including CAD s/p PCI (1/24/22) on dual antiplatelet therapy, CHF (EF 20%), HTN, ICD, and complicated diverticulitis with colovesical fistula.     She states that she has a h/o irregular bowel movements and had diverticulitis in 1/2021, warranting 2-day hospitalization for antibiotics. She states that air was noted the CT scan done then and not followed up until 10/2021.  She has had a feeling of air pass vaginally with urination in the past.  She did have a large gush of feculent material from the vagina in 9/2021, and she notes some continued discharge since then.  She has also had episodes of having some stool in her urine.  She denies vaginal bleeding.  She also reports a h/o POP and claims "they should have taken it all out 20 years ago."  She has tried pessaries in the past, but they were uncomfortable.  She was recently hospitalized by the CRS service with complicated diverticulitis with colovesical fistula. She describes fecaluria which is getting progressively worse. She also notes night sweats, poor PO tolerance, nausea, weight loss, and abdominal pain.     4/21/2022 CT A/P revealed: Rim enhancing abscess extending from the sigmoid colon to the rectum and left posterolateral vaginal cuff with a suspected underlying colorectal fistula.  No definite intravaginal air to suggest a colovaginal fistula. Colovesical fistula communicating the anterior sigmoid colon with the left lateral bladder dome. Evolving CT findings of acute on chronic sigmoid colon diverticulitis. Significant amount of atherosclerotic plaques throughout the abdominal aorta.  Imaging was reviewed with IR, who do not think " there would be benefit from aspiration/drainage at this time.     This admission, she has been receiving TPN for malnutrition and IV Zosyn for the Acute on chronic diverticulitis.   However, due to chronic, persistent nature of the infection, decision has been made to move forward with surgical intervention.     One major concern for her surgical candidacy is her decreased cardiac capacity.  Cardiology was consulted inpatient with the following thoughts:  65 YO woman with underlying ischemic cardiomyopathy and recent deployment of drug-eluting stents, now for intraperitoneal surgery.  Non-obstructive disease remained after stent deployment. History and physical examination suggest that her heart failure currently is decompensated. Primary risks at surgery stem from (1) volume overload, (2) EF 20%, (3) ischemic heart disease, and (4) perioperative interruption of dual antiplatelet therapy following recent HEVER deployment. Volume overload is only modifiable risk. Risk of interruption of prasugrel is acceptable after 90 days of therapy. Would begin IV diuresis and continue until euvolemic.  If anticipating surgery this admission, hold prasugrel for 7 days prior to operation. Restart prasugrel once post-operative bleeding risk acceptable (per surgical team.)  Undertake emergent surgery without delay if necessary. Once CHF is treated, RCRI class IV, indicating a 15 % chance of 30-day risk of death, MI, or cardiac arrest. By AHA guidelines algorithm, she is a high risk candidate for high risk surgery.     Past Medical History  Past Medical History:   Diagnosis Date    Acute coronary syndrome     Allergy     Arthritis     Cardiomyopathy     CHF (congestive heart failure)     Coronary artery disease     Coronary artery disease of native artery of native heart with stable angina pectoris 4/19/2021 1/24/2022: [facility]: Cath: LAD: Proximal stent patent. LCX: Proximal 80%. LCX: Dominant. Moderate disease. LCX: HEVER 2.75 x  18 mm. Distal dissection. HEVER 2.75 x 22 mm.     Diverticulitis     Diverticulosis     Familial hypercholesterolemia 2022    Former smoker 2022    Heart attack     Heart disease     History of myocardial infarction 2022    Hyperlipidemia     Hypertension     Hypertension 2022    ICD (implantable cardioverter-defibrillator) in place     Non-ST elevation myocardial infarction (NSTEMI) 2019        Past Surgical History  Past Surgical History:   Procedure Laterality Date    ATRIAL CARDIAC PACEMAKER INSERTION       SECTION      CORONARY STENT PLACEMENT      LEFT HEART CATHETERIZATION Left 2022    Procedure: CATHETERIZATION, HEART, LEFT;  Surgeon: Yohannes Cordova MD;  Location: [facility] CATH LAB;  Service: Cardiology;  Laterality: Left;      Hysterectomy: No    Past Ob History    SAB x 2  C/s x 2 (1st for breech).      Gynecologic History  LMP: No LMP recorded (lmp unknown). Patient is postmenopausal.  Age of menarche: 12 yo  Age of menopause: 45 yo  Menstrual history: h/o normal  Pap test: , normal per report.  History of abnormal paps: No.  History of STIs:  No  Mammogram: Has never had. States due to pacemaker.   Colonoscopy: none  DEXA:  Date of last: years ago per report.  Result:  unknown.     Family History  Family History   Problem Relation Age of Onset    Heart disease Mother     Emphysema Father     Heart attack Brother      Social History  Social History     Tobacco Use   Smoking Status Former Smoker    Packs/day: 0.50    Start date: 1976    Quit date: 2012    Years since quittin.3   Smokeless Tobacco Never Used     Social History     Substance and Sexual Activity   Alcohol Use No   .    Social History     Substance and Sexual Activity   Drug Use No     Resides in [place] [address].    Allergies  Review of patient's allergies indicates:   Allergen Reactions    Augmentin [amoxicillin-pot clavulanate] Swelling     Not allergic to  amoxicillin just a derivative of augmentin    Lisinopril Other (See Comments)     Fluid around heart    Losartan Other (See Comments)     High potassium    Shellfish containing products Anaphylaxis     Pt.states she is allergic to SEAFOOD since the age of 13    Levaquin [levofloxacin] Other (See Comments)     Very bad joint pain    Clindamycin Palpitations     Chest pain       Medications  No current facility-administered medications on file prior to encounter.     Current Outpatient Medications on File Prior to Encounter   Medication Sig Dispense Refill    acetaminophen (TYLENOL) 500 MG tablet Take 2 tablets (1,000 mg total) by mouth every 8 (eight) hours as needed for Pain.  0    albuterol (PROVENTIL/VENTOLIN HFA) 90 mcg/actuation inhaler Inhale 2 puffs into the lungs every 6 (six) hours as needed for Wheezing. Rescue 18 g 1    ALPRAZolam (XANAX) 0.5 MG tablet Take 1 tablet (0.5 mg total) by mouth nightly as needed for Anxiety. 30 tablet 2    aspirin (ECOTRIN) 81 MG EC tablet Take 1 tablet (81 mg total) by mouth once daily. 90 tablet 0    carvediloL (COREG) 12.5 MG tablet Take 1 tablet (12.5 mg total) by mouth 2 (two) times daily with meals. 180 tablet 3    cefdinir (OMNICEF) 125 mg/5 mL suspension Take 12 mLs (300 mg total) by mouth every 12 (twelve) hours. 720 mL 0    dicyclomine (BENTYL) 10 MG capsule Take 1 capsule (10 mg total) by mouth 4 (four) times daily as needed. 120 capsule 3    ezetimibe (ZETIA) 10 mg tablet Take 1 tablet (10 mg total) by mouth every evening. 90 tablet 3    fluticasone propionate (FLONASE) 50 mcg/actuation nasal spray 1 spray (50 mcg total) by Each Nostril route once daily. 9.9 mL 1    Lactobacillus rhamnosus GG (CULTURELLE) 10 billion cell capsule Take 1 capsule by mouth once daily.      metronidazole (FLAGYL) 50 mg/mL Syrg Take 10 mLs (500 mg total) by mouth every 8 (eight) hours. 900 mL 0    nitroGLYCERIN (NITROSTAT) 0.4 MG SL tablet Place 1 tablet (0.4 mg total)  "under the tongue every 5 (five) minutes as needed for Chest pain. (Patient not taking: Reported on 4/20/2022) 25 tablet 0    omeprazole (PRILOSEC) 40 MG capsule Take 1 capsule (40 mg total) by mouth once daily. 90 capsule 2    prasugreL (EFFIENT) 10 mg Tab Take 1 tablet (10 mg total) by mouth once daily. 90 tablet 0    promethazine (PROMETHEGAN) 25 MG suppository Place 1 suppository (25 mg total) rectally every 6 (six) hours as needed for Nausea. 12 suppository 2    simethicone (MYLICON) 80 MG chewable tablet Take 80 mg by mouth every 8 (eight) hours as needed.      spironolactone (ALDACTONE) 25 MG tablet Take 1 tablet (25 mg total) by mouth once daily. 90 tablet 3       Review of Systems A 14 point ROS was reviewed with pertinent positives as noted above in the history of present illness.      Constitutional: negative  Eyes: negative  Endocrine: negative  Gastrointestinal: negative  Cardiovascular: negative  Respiratory: negative  Allergic/Immunologic: negative  Integumentary: negative  Psychiatric: negative  Musculoskeletal: negative   Ear/Nose/Throat: negative  Neurologic: negative  Genitourinary: SEE HPI  Hematologic/Lymphatic: negative   Breast: negative    Urogynecologic Exam  BP (!) 102/51   Pulse 76   Temp 98 °F (36.7 °C)   Resp 16   Ht 5' 2.99" (1.6 m)   Wt 53.1 kg (117 lb 1 oz)   LMP  (LMP Unknown)   SpO2 99%   BMI 20.74 kg/m²     GENERAL APPEARANCE:  The patient is well-developed, thin.  Neck:  Supple with no thyromegaly, no carotid bruits.  Abdomen:  Soft, nontender, nondistended, no hepatosplenomegaly.  Incisions:  vert midline well-healed    PELVIC:    External genitalia:  Normal Bartholins, Skenes and labia bilaterally.    Urethra:  No caruncle, diverticulum or masses.  (+) hypermobility.    Vagina:  Atrophy (+) . Fistula defect noted at left posterior vagina, lateral left of the cervix, approx 4 cm cephalad of hymenal remnant.  White mucoid discharge noted to eminate from area with " "valsalva.  Difficult to discern size of defect, as area is very TTP on palpation but appears at least 0.5 cm at site of discharge.  Cervix:  normal appearance  Uterus: normal size, contour, position, consistency, mobility, non-tender  Adnexa: Not palpable.    POP-Q:  Aa -1; Ba -1; C -3; Ap -1; Bp -1; D -6.  Genital hiatus 3, perineal body 2, total vaginal length 10-11. (poor valsalva effort)    NEUROLOGIC:  Cranial nerves 2 through 12 intact.  Strength 5/5.  DTRs 2+ lower extremities.  S2 through 4 normal.  Sacral reflexes intact.    EXT: CORONEL, 2+ pulses bilaterally, no C/C/E    COUGH STRESS TEST:  negative  KEGEL: unable to perform    RECTAL:    External:  Normal, (--) hemorrhoids, (--) dovetailing.   Internal:   Deferred due to patient discomfort    Impression    1. Colovaginal fistula    2. Colovesical fistula        Initial Plan  The patient was counseled regarding these issues. The patient was given a summary sheet containing each of these issues with possible options for evaluation and management. When appropriate, we also reviewed computer-generated diagrams specific to their diagnoses..  All questions were addressed to the patient's satisfaction.    1)  Colovaginal fistula  --case discussed with Dr. Mckenzie  --appears L lateral of cervix along posterior vagina at mid rectum (4 cm cephalad of hymenal remnant)  --agree with CRS plan for colon resection + diversion  --would recommend closing the vaginal defect with vicryl or monocryl sutures at time of surgery (tootie if defect is 0.5 cm or >)  --agree with thought of omental J interposition flap to create barrier between healing vagina and pelvis so that incision can heal uninterrupted    2)  Stage 1-2 uterovaginal prolapse:  --difficult to fully evaluate, as valsalva effort was poor  --discussed need to defer surgical or other treatment of prolapse until patient is healed from fistula surgery  --patient inquires about hysterectomy: explained that "less is more" " for now (tootie with multiple, significant medical co-morbidities, and creating another incision (vaginal cuff) to heal would not be ideal in the setting of infectious process nearby  --discussed possible pessary use in future (patient states previous attempt was uncomfortable: reassured her there are many shapes/sizes and treating vaginal atrophy 1st may help with discomfort)    3)  Concern for colovesical fistula:  --patient notes stool in urine  --concern for L dome/lateral bladder defect on imaging  --URO plans to perform cystoscopy/stents at time of OR; would defer to their management of any urinary fistula    4)  We appreciate providing consulting for Odette Hart.  We remain available for any further questions concerns and are happy to be available for intraoperative assistance or postoperative follow up if needed.     Approximately 30 min were spent in consult, 90 % in discussion.     Thank you for requesting consultation of your patient.  I look forward to participating in their care.    Jake Irvin  Female Pelvic Medicine and Reconstructive Surgery  [facility] [place], [place]

## 2022-04-27 NOTE — PLAN OF CARE
[facility]  Discharge Final Note    Primary Care Provider: Braxton Barragan MD    Expected Discharge Date: 4/27/2022    Patient will be discharging to [facility]     Final Discharge Note (most recent)     Final Note - 04/27/22 1430        Final Note    Assessment Type Final Discharge Note     Anticipated Discharge Disposition Long Term Acute Care                 Nahomy Jalloh RN, CM   Ext: [phone]

## 2022-04-27 NOTE — PLAN OF CARE
04/27/22 1432   Post-Acute Status   Post-Acute Authorization Placement   Post-Acute Placement Status Set-up Complete/Auth obtained   Pt going to [facility]. SW arranged wheelchair transport via [facility]. Requested  time is 3:00 PM. Requested  time does not guarantee arrival time.    Nurse to call report to [phone].      Cristina Kendall LMSW  [facility]  [phone]

## 2022-04-28 ENCOUNTER — PATIENT OUTREACH (OUTPATIENT)
Dept: ADMINISTRATIVE | Facility: OTHER | Age: 67
End: 2022-04-28
Payer: MEDICARE

## 2022-05-06 ENCOUNTER — TELEPHONE (OUTPATIENT)
Dept: SURGERY | Facility: CLINIC | Age: 67
End: 2022-05-06
Payer: MEDICARE

## 2022-05-06 ENCOUNTER — HOSPITAL ENCOUNTER (INPATIENT)
Facility: HOSPITAL | Age: 67
LOS: 11 days | Discharge: NURSING, REHAB OR LONG-TERM CARE | DRG: 329 | End: 2022-05-20
Attending: EMERGENCY MEDICINE | Admitting: HOSPITALIST
Payer: MEDICARE

## 2022-05-06 ENCOUNTER — ANESTHESIA EVENT (OUTPATIENT)
Dept: SURGERY | Facility: HOSPITAL | Age: 67
DRG: 329 | End: 2022-05-06
Payer: MEDICARE

## 2022-05-06 DIAGNOSIS — N32.1 COLOVESICAL FISTULA: Primary | ICD-10-CM

## 2022-05-06 DIAGNOSIS — R23.9 ALTERATION IN SKIN INTEGRITY IN ADULT: ICD-10-CM

## 2022-05-06 DIAGNOSIS — K80.10 CALCULUS OF GALLBLADDER WITH CHOLECYSTITIS WITHOUT BILIARY OBSTRUCTION, UNSPECIFIED CHOLECYSTITIS ACUITY: ICD-10-CM

## 2022-05-06 DIAGNOSIS — R07.9 CHEST PAIN: ICD-10-CM

## 2022-05-06 DIAGNOSIS — Z51.81 THERAPEUTIC DRUG MONITORING: ICD-10-CM

## 2022-05-06 DIAGNOSIS — R06.02 SOB (SHORTNESS OF BREATH): ICD-10-CM

## 2022-05-06 DIAGNOSIS — I49.3 PVC'S (PREMATURE VENTRICULAR CONTRACTIONS): ICD-10-CM

## 2022-05-06 DIAGNOSIS — R42 DIZZINESS: ICD-10-CM

## 2022-05-06 DIAGNOSIS — K80.12 CALCULUS OF GALLBLADDER WITH ACUTE ON CHRONIC CHOLECYSTITIS WITHOUT OBSTRUCTION: ICD-10-CM

## 2022-05-06 DIAGNOSIS — F39 MOOD DISORDER: ICD-10-CM

## 2022-05-06 DIAGNOSIS — R23.9 ALTERATION IN SKIN INTEGRITY: ICD-10-CM

## 2022-05-06 DIAGNOSIS — K80.10 CALCULUS OF GALLBLADDER WITH CHOLECYSTITIS WITHOUT BILIARY OBSTRUCTION: ICD-10-CM

## 2022-05-06 DIAGNOSIS — I25.5 ISCHEMIC CARDIOMYOPATHY: ICD-10-CM

## 2022-05-06 DIAGNOSIS — R13.10 DYSPHAGIA, UNSPECIFIED TYPE: ICD-10-CM

## 2022-05-06 DIAGNOSIS — Z91.89 AT RISK FOR LONG QT SYNDROME: ICD-10-CM

## 2022-05-06 DIAGNOSIS — I50.20 SYSTOLIC HEART FAILURE: ICD-10-CM

## 2022-05-06 PROBLEM — K81.0 ACUTE CHOLECYSTITIS: Status: ACTIVE | Noted: 2022-05-06

## 2022-05-06 PROBLEM — R10.12 LEFT UPPER QUADRANT ABDOMINAL PAIN: Status: ACTIVE | Noted: 2022-05-06

## 2022-05-06 LAB
ALBUMIN SERPL BCP-MCNC: 2.2 G/DL (ref 3.5–5.2)
ALP SERPL-CCNC: 85 U/L (ref 55–135)
ALT SERPL W/O P-5'-P-CCNC: 25 U/L (ref 10–44)
ANION GAP SERPL CALC-SCNC: 9 MMOL/L (ref 8–16)
ANISOCYTOSIS BLD QL SMEAR: SLIGHT
AST SERPL-CCNC: 46 U/L (ref 10–40)
BACTERIA #/AREA URNS AUTO: ABNORMAL /HPF
BASOPHILS # BLD AUTO: 0.02 K/UL (ref 0–0.2)
BASOPHILS NFR BLD: 1 % (ref 0–1.9)
BILIRUB SERPL-MCNC: 1.2 MG/DL (ref 0.1–1)
BILIRUB UR QL STRIP: NEGATIVE
BUN SERPL-MCNC: 20 MG/DL (ref 8–23)
BUN SERPL-MCNC: 22 MG/DL (ref 6–30)
CALCIUM SERPL-MCNC: 8.3 MG/DL (ref 8.7–10.5)
CHLORIDE SERPL-SCNC: 99 MMOL/L (ref 95–110)
CHLORIDE SERPL-SCNC: 99 MMOL/L (ref 95–110)
CLARITY UR REFRACT.AUTO: CLEAR
CO2 SERPL-SCNC: 27 MMOL/L (ref 23–29)
COLOR UR AUTO: YELLOW
CREAT SERPL-MCNC: 0.5 MG/DL (ref 0.5–1.4)
CREAT SERPL-MCNC: 0.6 MG/DL (ref 0.5–1.4)
CTP QC/QA: YES
DACRYOCYTES BLD QL SMEAR: ABNORMAL
DIFFERENTIAL METHOD: ABNORMAL
EOSINOPHIL # BLD AUTO: 0 K/UL (ref 0–0.5)
EOSINOPHIL NFR BLD: 0.5 % (ref 0–8)
ERYTHROCYTE [DISTWIDTH] IN BLOOD BY AUTOMATED COUNT: 24.1 % (ref 11.5–14.5)
EST. GFR  (AFRICAN AMERICAN): >60 ML/MIN/1.73 M^2
EST. GFR  (NON AFRICAN AMERICAN): >60 ML/MIN/1.73 M^2
GLUCOSE SERPL-MCNC: 78 MG/DL (ref 70–110)
GLUCOSE SERPL-MCNC: 82 MG/DL (ref 70–110)
GLUCOSE UR QL STRIP: NEGATIVE
HCT VFR BLD AUTO: 50.6 % (ref 37–48.5)
HCT VFR BLD CALC: 46 %PCV (ref 36–54)
HGB BLD-MCNC: 16.1 G/DL (ref 12–16)
HGB UR QL STRIP: ABNORMAL
HYALINE CASTS UR QL AUTO: 0 /LPF
IMM GRANULOCYTES # BLD AUTO: 0 K/UL (ref 0–0.04)
IMM GRANULOCYTES NFR BLD AUTO: 0 % (ref 0–0.5)
KETONES UR QL STRIP: NEGATIVE
LEUKOCYTE ESTERASE UR QL STRIP: ABNORMAL
LIPASE SERPL-CCNC: 7 U/L (ref 4–60)
LYMPHOCYTES # BLD AUTO: 1.3 K/UL (ref 1–4.8)
LYMPHOCYTES NFR BLD: 64 % (ref 18–48)
MCH RBC QN AUTO: 27.2 PG (ref 27–31)
MCHC RBC AUTO-ENTMCNC: 31.8 G/DL (ref 32–36)
MCV RBC AUTO: 86 FL (ref 82–98)
MICROSCOPIC COMMENT: ABNORMAL
MONOCYTES # BLD AUTO: 0.4 K/UL (ref 0.3–1)
MONOCYTES NFR BLD: 18.5 % (ref 4–15)
NEUTROPHILS # BLD AUTO: 0.3 K/UL (ref 1.8–7.7)
NEUTROPHILS NFR BLD: 16 % (ref 38–73)
NITRITE UR QL STRIP: NEGATIVE
NRBC BLD-RTO: 2 /100 WBC
OVALOCYTES BLD QL SMEAR: ABNORMAL
PH UR STRIP: 6 [PH] (ref 5–8)
PLATELET # BLD AUTO: 139 K/UL (ref 150–450)
PLATELET BLD QL SMEAR: ABNORMAL
PMV BLD AUTO: 10.3 FL (ref 9.2–12.9)
POC IONIZED CALCIUM: 1.19 MMOL/L (ref 1.06–1.42)
POC TCO2 (MEASURED): 29 MMOL/L (ref 23–29)
POIKILOCYTOSIS BLD QL SMEAR: SLIGHT
POLYCHROMASIA BLD QL SMEAR: ABNORMAL
POTASSIUM BLD-SCNC: 3.7 MMOL/L (ref 3.5–5.1)
POTASSIUM SERPL-SCNC: 3.8 MMOL/L (ref 3.5–5.1)
PROT SERPL-MCNC: 7 G/DL (ref 6–8.4)
PROT UR QL STRIP: ABNORMAL
RBC # BLD AUTO: 5.92 M/UL (ref 4–5.4)
RBC #/AREA URNS AUTO: 5 /HPF (ref 0–4)
SAMPLE: NORMAL
SARS-COV-2 RDRP RESP QL NAA+PROBE: NEGATIVE
SODIUM BLD-SCNC: 138 MMOL/L (ref 136–145)
SODIUM SERPL-SCNC: 135 MMOL/L (ref 136–145)
SP GR UR STRIP: 1.01 (ref 1–1.03)
SQUAMOUS #/AREA URNS AUTO: 1 /HPF
URN SPEC COLLECT METH UR: ABNORMAL
WBC # BLD AUTO: 2 K/UL (ref 3.9–12.7)
WBC #/AREA URNS AUTO: 48 /HPF (ref 0–5)

## 2022-05-06 PROCEDURE — 80053 COMPREHEN METABOLIC PANEL: CPT | Performed by: EMERGENCY MEDICINE

## 2022-05-06 PROCEDURE — 80047 BASIC METABLC PNL IONIZED CA: CPT

## 2022-05-06 PROCEDURE — 63600175 PHARM REV CODE 636 W HCPCS: Performed by: EMERGENCY MEDICINE

## 2022-05-06 PROCEDURE — 96374 THER/PROPH/DIAG INJ IV PUSH: CPT

## 2022-05-06 PROCEDURE — 83690 ASSAY OF LIPASE: CPT | Performed by: EMERGENCY MEDICINE

## 2022-05-06 PROCEDURE — U0002 COVID-19 LAB TEST NON-CDC: HCPCS | Performed by: EMERGENCY MEDICINE

## 2022-05-06 PROCEDURE — 87086 URINE CULTURE/COLONY COUNT: CPT | Performed by: EMERGENCY MEDICINE

## 2022-05-06 PROCEDURE — 99220 PR INITIAL OBSERVATION CARE,LEVL III: ICD-10-PCS | Mod: ,,, | Performed by: PHYSICIAN ASSISTANT

## 2022-05-06 PROCEDURE — 87077 CULTURE AEROBIC IDENTIFY: CPT | Performed by: EMERGENCY MEDICINE

## 2022-05-06 PROCEDURE — 99285 EMERGENCY DEPT VISIT HI MDM: CPT | Mod: 25

## 2022-05-06 PROCEDURE — 99285 EMERGENCY DEPT VISIT HI MDM: CPT | Mod: CS,,, | Performed by: EMERGENCY MEDICINE

## 2022-05-06 PROCEDURE — 87186 SC STD MICRODIL/AGAR DIL: CPT | Performed by: EMERGENCY MEDICINE

## 2022-05-06 PROCEDURE — 99220 PR INITIAL OBSERVATION CARE,LEVL III: CPT | Mod: ,,, | Performed by: PHYSICIAN ASSISTANT

## 2022-05-06 PROCEDURE — 87088 URINE BACTERIA CULTURE: CPT | Performed by: EMERGENCY MEDICINE

## 2022-05-06 PROCEDURE — 81001 URINALYSIS AUTO W/SCOPE: CPT | Performed by: EMERGENCY MEDICINE

## 2022-05-06 PROCEDURE — 99285 PR EMERGENCY DEPT VISIT,LEVEL V: ICD-10-PCS | Mod: CS,,, | Performed by: EMERGENCY MEDICINE

## 2022-05-06 PROCEDURE — 85025 COMPLETE CBC W/AUTO DIFF WBC: CPT | Mod: 91 | Performed by: EMERGENCY MEDICINE

## 2022-05-06 PROCEDURE — G0378 HOSPITAL OBSERVATION PER HR: HCPCS

## 2022-05-06 RX ORDER — ACETAMINOPHEN 325 MG/1
650 TABLET ORAL EVERY 4 HOURS PRN
Status: DISCONTINUED | OUTPATIENT
Start: 2022-05-07 | End: 2022-05-10

## 2022-05-06 RX ORDER — MAG HYDROX/ALUMINUM HYD/SIMETH 200-200-20
30 SUSPENSION, ORAL (FINAL DOSE FORM) ORAL 4 TIMES DAILY PRN
Status: DISCONTINUED | OUTPATIENT
Start: 2022-05-07 | End: 2022-01-01 | Stop reason: HOSPADM

## 2022-05-06 RX ORDER — TALC
6 POWDER (GRAM) TOPICAL NIGHTLY PRN
Status: DISCONTINUED | OUTPATIENT
Start: 2022-05-07 | End: 2022-05-10

## 2022-05-06 RX ORDER — ENOXAPARIN SODIUM 100 MG/ML
40 INJECTION SUBCUTANEOUS EVERY 24 HOURS
Status: DISCONTINUED | OUTPATIENT
Start: 2022-05-07 | End: 2022-01-01

## 2022-05-06 RX ORDER — IPRATROPIUM BROMIDE AND ALBUTEROL SULFATE 2.5; .5 MG/3ML; MG/3ML
3 SOLUTION RESPIRATORY (INHALATION) EVERY 4 HOURS PRN
Status: DISCONTINUED | OUTPATIENT
Start: 2022-05-07 | End: 2022-01-01 | Stop reason: HOSPADM

## 2022-05-06 RX ORDER — ONDANSETRON 8 MG/1
8 TABLET, ORALLY DISINTEGRATING ORAL EVERY 8 HOURS PRN
Status: DISCONTINUED | OUTPATIENT
Start: 2022-05-07 | End: 2022-05-10

## 2022-05-06 RX ORDER — PROCHLORPERAZINE EDISYLATE 5 MG/ML
5 INJECTION INTRAMUSCULAR; INTRAVENOUS EVERY 6 HOURS PRN
Status: DISCONTINUED | OUTPATIENT
Start: 2022-05-07 | End: 2022-05-10

## 2022-05-06 RX ORDER — POLYETHYLENE GLYCOL 3350 17 G/17G
17 POWDER, FOR SOLUTION ORAL DAILY PRN
Status: DISCONTINUED | OUTPATIENT
Start: 2022-05-07 | End: 2022-05-10

## 2022-05-06 RX ORDER — GLUCAGON 1 MG
1 KIT INJECTION
Status: DISCONTINUED | OUTPATIENT
Start: 2022-05-07 | End: 2022-01-01 | Stop reason: HOSPADM

## 2022-05-06 RX ORDER — ONDANSETRON 2 MG/ML
4 INJECTION INTRAMUSCULAR; INTRAVENOUS
Status: COMPLETED | OUTPATIENT
Start: 2022-05-06 | End: 2022-05-06

## 2022-05-06 RX ORDER — SODIUM CHLORIDE 0.9 % (FLUSH) 0.9 %
10 SYRINGE (ML) INJECTION EVERY 8 HOURS PRN
Status: DISCONTINUED | OUTPATIENT
Start: 2022-05-07 | End: 2022-01-01 | Stop reason: HOSPADM

## 2022-05-06 RX ORDER — IBUPROFEN 200 MG
24 TABLET ORAL
Status: DISCONTINUED | OUTPATIENT
Start: 2022-05-07 | End: 2022-01-01 | Stop reason: HOSPADM

## 2022-05-06 RX ORDER — NALOXONE HCL 0.4 MG/ML
0.02 VIAL (ML) INJECTION
Status: DISCONTINUED | OUTPATIENT
Start: 2022-05-07 | End: 2022-05-09

## 2022-05-06 RX ORDER — SIMETHICONE 80 MG
1 TABLET,CHEWABLE ORAL 4 TIMES DAILY PRN
Status: DISCONTINUED | OUTPATIENT
Start: 2022-05-07 | End: 2022-05-10

## 2022-05-06 RX ORDER — IBUPROFEN 200 MG
16 TABLET ORAL
Status: DISCONTINUED | OUTPATIENT
Start: 2022-05-07 | End: 2022-01-01 | Stop reason: HOSPADM

## 2022-05-06 RX ADMIN — ONDANSETRON 4 MG: 2 INJECTION INTRAMUSCULAR; INTRAVENOUS at 09:05

## 2022-05-06 NOTE — TELEPHONE ENCOUNTER
Spoke with representative at [facility] ([phone]). Callback number provided in regards to arranging transportation for surgery on Monday.

## 2022-05-06 NOTE — TELEPHONE ENCOUNTER
Spoke with Chelsea (NP at [facility]) regarding surgery scheduled for Monday. Chelsea would like to speak to Dr. Cardozo directly regarding need for surgery. Secure message sent via Epic.

## 2022-05-07 PROBLEM — D64.9 ANEMIA: Status: ACTIVE | Noted: 2022-05-07

## 2022-05-07 LAB
ALBUMIN SERPL BCP-MCNC: 2 G/DL (ref 3.5–5.2)
ALP SERPL-CCNC: 82 U/L (ref 55–135)
ALT SERPL W/O P-5'-P-CCNC: 27 U/L (ref 10–44)
ANION GAP SERPL CALC-SCNC: 6 MMOL/L (ref 8–16)
ANISOCYTOSIS BLD QL SMEAR: ABNORMAL
AST SERPL-CCNC: 46 U/L (ref 10–40)
BASOPHILS NFR BLD: 1 % (ref 0–1.9)
BILIRUB SERPL-MCNC: 1.2 MG/DL (ref 0.1–1)
BUN SERPL-MCNC: 21 MG/DL (ref 8–23)
CALCIUM SERPL-MCNC: 8.6 MG/DL (ref 8.7–10.5)
CHLORIDE SERPL-SCNC: 99 MMOL/L (ref 95–110)
CHOLEST SERPL-MCNC: 109 MG/DL (ref 120–199)
CHOLEST/HDLC SERPL: 15.6 {RATIO} (ref 2–5)
CO2 SERPL-SCNC: 31 MMOL/L (ref 23–29)
CREAT SERPL-MCNC: 0.6 MG/DL (ref 0.5–1.4)
DACRYOCYTES BLD QL SMEAR: ABNORMAL
DIFFERENTIAL METHOD: ABNORMAL
EOSINOPHIL NFR BLD: 2 % (ref 0–8)
ERYTHROCYTE [DISTWIDTH] IN BLOOD BY AUTOMATED COUNT: 23.4 % (ref 11.5–14.5)
EST. GFR  (AFRICAN AMERICAN): >60 ML/MIN/1.73 M^2
EST. GFR  (NON AFRICAN AMERICAN): >60 ML/MIN/1.73 M^2
GLUCOSE SERPL-MCNC: 75 MG/DL (ref 70–110)
HCT VFR BLD AUTO: 32.7 % (ref 37–48.5)
HDLC SERPL-MCNC: 7 MG/DL (ref 40–75)
HDLC SERPL: 6.4 % (ref 20–50)
HGB BLD-MCNC: 10.3 G/DL (ref 12–16)
HYPOCHROMIA BLD QL SMEAR: ABNORMAL
IMM GRANULOCYTES # BLD AUTO: ABNORMAL K/UL (ref 0–0.04)
IMM GRANULOCYTES NFR BLD AUTO: ABNORMAL % (ref 0–0.5)
LDLC SERPL CALC-MCNC: 63.8 MG/DL (ref 63–159)
LYMPHOCYTES NFR BLD: 66 % (ref 18–48)
MAGNESIUM SERPL-MCNC: 1.7 MG/DL (ref 1.6–2.6)
MCH RBC QN AUTO: 27.5 PG (ref 27–31)
MCHC RBC AUTO-ENTMCNC: 31.5 G/DL (ref 32–36)
MCV RBC AUTO: 87 FL (ref 82–98)
MONOCYTES NFR BLD: 16 % (ref 4–15)
NEUTROPHILS NFR BLD: 14 % (ref 38–73)
NEUTS BAND NFR BLD MANUAL: 1 %
NONHDLC SERPL-MCNC: 102 MG/DL
NRBC BLD-RTO: 0 /100 WBC
OVALOCYTES BLD QL SMEAR: ABNORMAL
PHOSPHATE SERPL-MCNC: 3 MG/DL (ref 2.7–4.5)
PLATELET # BLD AUTO: 234 K/UL (ref 150–450)
PLATELET BLD QL SMEAR: ABNORMAL
PMV BLD AUTO: 10.7 FL (ref 9.2–12.9)
POCT GLUCOSE: 80 MG/DL (ref 70–110)
POIKILOCYTOSIS BLD QL SMEAR: SLIGHT
POLYCHROMASIA BLD QL SMEAR: ABNORMAL
POTASSIUM SERPL-SCNC: 3.9 MMOL/L (ref 3.5–5.1)
PROT SERPL-MCNC: 6.5 G/DL (ref 6–8.4)
RBC # BLD AUTO: 3.74 M/UL (ref 4–5.4)
ROULEAUX BLD QL SMEAR: PRESENT
SMUDGE CELLS BLD QL SMEAR: PRESENT
SODIUM SERPL-SCNC: 136 MMOL/L (ref 136–145)
TOXIC GRANULES BLD QL SMEAR: PRESENT
TRIGL SERPL-MCNC: 191 MG/DL (ref 30–150)
WBC # BLD AUTO: 3.15 K/UL (ref 3.9–12.7)

## 2022-05-07 PROCEDURE — 99222 PR INITIAL HOSPITAL CARE,LEVL II: ICD-10-PCS | Mod: ,,, | Performed by: SURGERY

## 2022-05-07 PROCEDURE — G0378 HOSPITAL OBSERVATION PER HR: HCPCS

## 2022-05-07 PROCEDURE — 85007 BL SMEAR W/DIFF WBC COUNT: CPT | Mod: NCS | Performed by: PHYSICIAN ASSISTANT

## 2022-05-07 PROCEDURE — 83735 ASSAY OF MAGNESIUM: CPT | Performed by: PHYSICIAN ASSISTANT

## 2022-05-07 PROCEDURE — 84100 ASSAY OF PHOSPHORUS: CPT | Performed by: PHYSICIAN ASSISTANT

## 2022-05-07 PROCEDURE — B4185 PARENTERAL SOL 10 GM LIPIDS: HCPCS | Performed by: STUDENT IN AN ORGANIZED HEALTH CARE EDUCATION/TRAINING PROGRAM

## 2022-05-07 PROCEDURE — 63600175 PHARM REV CODE 636 W HCPCS: Performed by: STUDENT IN AN ORGANIZED HEALTH CARE EDUCATION/TRAINING PROGRAM

## 2022-05-07 PROCEDURE — 85027 COMPLETE CBC AUTOMATED: CPT | Performed by: PHYSICIAN ASSISTANT

## 2022-05-07 PROCEDURE — 25000003 PHARM REV CODE 250: Performed by: STUDENT IN AN ORGANIZED HEALTH CARE EDUCATION/TRAINING PROGRAM

## 2022-05-07 PROCEDURE — A4217 STERILE WATER/SALINE, 500 ML: HCPCS | Performed by: STUDENT IN AN ORGANIZED HEALTH CARE EDUCATION/TRAINING PROGRAM

## 2022-05-07 PROCEDURE — 25000003 PHARM REV CODE 250: Performed by: PHYSICIAN ASSISTANT

## 2022-05-07 PROCEDURE — 99222 1ST HOSP IP/OBS MODERATE 55: CPT | Mod: ,,, | Performed by: SURGERY

## 2022-05-07 PROCEDURE — 84165 PROTEIN E-PHORESIS SERUM: CPT | Mod: 26,,, | Performed by: PATHOLOGY

## 2022-05-07 PROCEDURE — 36415 COLL VENOUS BLD VENIPUNCTURE: CPT | Performed by: PHYSICIAN ASSISTANT

## 2022-05-07 PROCEDURE — S0030 INJECTION, METRONIDAZOLE: HCPCS | Performed by: STUDENT IN AN ORGANIZED HEALTH CARE EDUCATION/TRAINING PROGRAM

## 2022-05-07 PROCEDURE — 84165 PATHOLOGIST INTERPRETATION SPE: ICD-10-PCS | Mod: 26,,, | Performed by: PATHOLOGY

## 2022-05-07 PROCEDURE — 99226 PR SUBSEQUENT OBSERVATION CARE,LEVEL III: CPT | Mod: ,,, | Performed by: STUDENT IN AN ORGANIZED HEALTH CARE EDUCATION/TRAINING PROGRAM

## 2022-05-07 PROCEDURE — 84165 PROTEIN E-PHORESIS SERUM: CPT | Performed by: STUDENT IN AN ORGANIZED HEALTH CARE EDUCATION/TRAINING PROGRAM

## 2022-05-07 PROCEDURE — 99226 PR SUBSEQUENT OBSERVATION CARE,LEVEL III: ICD-10-PCS | Mod: ,,, | Performed by: STUDENT IN AN ORGANIZED HEALTH CARE EDUCATION/TRAINING PROGRAM

## 2022-05-07 PROCEDURE — 80053 COMPREHEN METABOLIC PANEL: CPT | Performed by: PHYSICIAN ASSISTANT

## 2022-05-07 PROCEDURE — 80061 LIPID PANEL: CPT | Performed by: STUDENT IN AN ORGANIZED HEALTH CARE EDUCATION/TRAINING PROGRAM

## 2022-05-07 PROCEDURE — 63600175 PHARM REV CODE 636 W HCPCS: Performed by: PHYSICIAN ASSISTANT

## 2022-05-07 RX ORDER — TRAMADOL HYDROCHLORIDE 50 MG/1
50 TABLET ORAL EVERY 6 HOURS PRN
Status: DISCONTINUED | OUTPATIENT
Start: 2022-05-07 | End: 2022-05-10

## 2022-05-07 RX ORDER — CARVEDILOL 12.5 MG/1
12.5 TABLET ORAL 2 TIMES DAILY WITH MEALS
Status: DISCONTINUED | OUTPATIENT
Start: 2022-05-07 | End: 2022-01-01

## 2022-05-07 RX ORDER — FUROSEMIDE 10 MG/ML
40 INJECTION INTRAMUSCULAR; INTRAVENOUS ONCE
Status: COMPLETED | OUTPATIENT
Start: 2022-05-07 | End: 2022-05-07

## 2022-05-07 RX ORDER — SIMETHICONE 80 MG
1 TABLET,CHEWABLE ORAL 3 TIMES DAILY
Status: DISCONTINUED | OUTPATIENT
Start: 2022-05-07 | End: 2022-05-10

## 2022-05-07 RX ORDER — SCOLOPAMINE TRANSDERMAL SYSTEM 1 MG/1
1 PATCH, EXTENDED RELEASE TRANSDERMAL
Status: DISCONTINUED | OUTPATIENT
Start: 2022-05-07 | End: 2022-01-01

## 2022-05-07 RX ORDER — METRONIDAZOLE 500 MG/100ML
500 INJECTION, SOLUTION INTRAVENOUS
Status: COMPLETED | OUTPATIENT
Start: 2022-05-07 | End: 2022-05-09

## 2022-05-07 RX ORDER — DICYCLOMINE HYDROCHLORIDE 10 MG/1
10 CAPSULE ORAL 3 TIMES DAILY
Status: DISCONTINUED | OUTPATIENT
Start: 2022-05-07 | End: 2022-05-10

## 2022-05-07 RX ORDER — ASPIRIN 81 MG/1
81 TABLET ORAL DAILY
Status: DISCONTINUED | OUTPATIENT
Start: 2022-05-07 | End: 2022-01-01 | Stop reason: HOSPADM

## 2022-05-07 RX ORDER — ONDANSETRON 2 MG/ML
4 INJECTION INTRAMUSCULAR; INTRAVENOUS 4 TIMES DAILY
Status: DISCONTINUED | OUTPATIENT
Start: 2022-05-07 | End: 2022-01-01

## 2022-05-07 RX ORDER — POLYETHYLENE GLYCOL 3350, SODIUM SULFATE ANHYDROUS, SODIUM BICARBONATE, SODIUM CHLORIDE, POTASSIUM CHLORIDE 236; 22.74; 6.74; 5.86; 2.97 G/4L; G/4L; G/4L; G/4L; G/4L
4000 POWDER, FOR SOLUTION ORAL ONCE
Status: COMPLETED | OUTPATIENT
Start: 2022-05-07 | End: 2022-05-07

## 2022-05-07 RX ORDER — INSULIN ASPART 100 [IU]/ML
1-10 INJECTION, SOLUTION INTRAVENOUS; SUBCUTANEOUS EVERY 6 HOURS PRN
Status: DISCONTINUED | OUTPATIENT
Start: 2022-05-07 | End: 2022-01-01 | Stop reason: HOSPADM

## 2022-05-07 RX ORDER — PANTOPRAZOLE SODIUM 40 MG/1
40 TABLET, DELAYED RELEASE ORAL DAILY
Status: DISCONTINUED | OUTPATIENT
Start: 2022-05-07 | End: 2022-01-01 | Stop reason: HOSPADM

## 2022-05-07 RX ORDER — CEFEPIME HYDROCHLORIDE 1 G/50ML
2 INJECTION, SOLUTION INTRAVENOUS EVERY 8 HOURS
Status: DISPENSED | OUTPATIENT
Start: 2022-05-07 | End: 2022-01-01

## 2022-05-07 RX ORDER — HYDROCORTISONE 25 MG/G
CREAM TOPICAL 2 TIMES DAILY
Status: COMPLETED | OUTPATIENT
Start: 2022-05-07 | End: 2022-05-08

## 2022-05-07 RX ORDER — ALPRAZOLAM 0.5 MG/1
0.5 TABLET ORAL NIGHTLY PRN
Status: DISCONTINUED | OUTPATIENT
Start: 2022-05-07 | End: 2022-01-01 | Stop reason: HOSPADM

## 2022-05-07 RX ORDER — FUROSEMIDE 20 MG/1
40 TABLET ORAL DAILY
Status: DISCONTINUED | OUTPATIENT
Start: 2022-05-07 | End: 2022-05-09

## 2022-05-07 RX ADMIN — ONDANSETRON 4 MG: 2 INJECTION INTRAMUSCULAR; INTRAVENOUS at 08:05

## 2022-05-07 RX ADMIN — CEFEPIME HYDROCHLORIDE 2 G: 2 INJECTION, SOLUTION INTRAVENOUS at 06:05

## 2022-05-07 RX ADMIN — FUROSEMIDE 40 MG: 10 INJECTION, SOLUTION INTRAMUSCULAR; INTRAVENOUS at 02:05

## 2022-05-07 RX ADMIN — HYDROCORTISONE: 25 CREAM TOPICAL at 11:05

## 2022-05-07 RX ADMIN — DICYCLOMINE HYDROCHLORIDE 10 MG: 10 CAPSULE ORAL at 09:05

## 2022-05-07 RX ADMIN — MAGNESIUM SULFATE HEPTAHYDRATE: 500 INJECTION, SOLUTION INTRAMUSCULAR; INTRAVENOUS at 10:05

## 2022-05-07 RX ADMIN — CEFEPIME HYDROCHLORIDE 2 G: 2 INJECTION, SOLUTION INTRAVENOUS at 10:05

## 2022-05-07 RX ADMIN — SCOPALAMINE 1 PATCH: 1 PATCH, EXTENDED RELEASE TRANSDERMAL at 05:05

## 2022-05-07 RX ADMIN — CARVEDILOL 12.5 MG: 12.5 TABLET, FILM COATED ORAL at 08:05

## 2022-05-07 RX ADMIN — ONDANSETRON 4 MG: 2 INJECTION INTRAMUSCULAR; INTRAVENOUS at 02:05

## 2022-05-07 RX ADMIN — ONDANSETRON 4 MG: 2 INJECTION INTRAMUSCULAR; INTRAVENOUS at 05:05

## 2022-05-07 RX ADMIN — I.V. FAT EMULSION 250 ML: 20 EMULSION INTRAVENOUS at 10:05

## 2022-05-07 RX ADMIN — CARVEDILOL 12.5 MG: 12.5 TABLET, FILM COATED ORAL at 05:05

## 2022-05-07 RX ADMIN — POLYETHYLENE GLYCOL 3350, SODIUM SULFATE ANHYDROUS, SODIUM BICARBONATE, SODIUM CHLORIDE, POTASSIUM CHLORIDE 4000 ML: 236; 22.74; 6.74; 5.86; 2.97 POWDER, FOR SOLUTION ORAL at 02:05

## 2022-05-07 RX ADMIN — ASPIRIN 81 MG: 81 TABLET, COATED ORAL at 08:05

## 2022-05-07 RX ADMIN — FUROSEMIDE 40 MG: 40 TABLET ORAL at 08:05

## 2022-05-07 RX ADMIN — HYDROCORTISONE: 25 CREAM TOPICAL at 09:05

## 2022-05-07 RX ADMIN — METRONIDAZOLE 500 MG: 500 SOLUTION INTRAVENOUS at 08:05

## 2022-05-07 RX ADMIN — PANTOPRAZOLE SODIUM 40 MG: 40 TABLET, DELAYED RELEASE ORAL at 08:05

## 2022-05-07 RX ADMIN — METRONIDAZOLE 500 MG: 500 SOLUTION INTRAVENOUS at 11:05

## 2022-05-07 RX ADMIN — CEFEPIME HYDROCHLORIDE 2 G: 2 INJECTION, SOLUTION INTRAVENOUS at 02:05

## 2022-05-07 NOTE — ED NOTES
I-STAT Chem-8+ Results:   Value Reference Range   Sodium 138 136-145 mmol/L   Potassium  3.7 3.5-5.1 mmol/L   Chloride 99  mmol/L   Ionized Calcium 1.19 1.06-1.42 mmol/L   CO2 (measured) 29 23-29 mmol/L   Glucose 82  mg/dL   BUN 22 6-30 mg/dL   Creatinine 0.5 0.5-1.4 mg/dL   Hematocrit 46 36-54%

## 2022-05-07 NOTE — ASSESSMENT & PLAN NOTE
- Continue cefepime 2grams iv and flagyl 500mg iv every 8 hours  - Monitor strict I&O   - Continue Zofran 4mg iv every 6 hours while awake.\  - CRS following, recs appreciated. Per discussion today, planning for corrective surgery on Monday with cholecystectomy.

## 2022-05-07 NOTE — SUBJECTIVE & OBJECTIVE
Interval History:   NAEON. This morning Ms. Hart feels improved, notes breathing has improved since receiving IV lasix yesterday, still feeling sob when she removes her oxygen or transfers to the commode. Able to lie flat with mild worsening dyspnea.     Colorectal surgery present in room to discuss plan. Will restart tpn today, npo if HIDA scan can be done today otherwise resume clear liquids. Prep Sunday night for combined colorectal intervention and cholecystectomy on Monday.    Review of Systems   Constitutional:  Negative for chills and fever.   HENT:  Negative for congestion and rhinorrhea.    Respiratory:  Positive for shortness of breath. Negative for chest tightness.    Cardiovascular:  Negative for chest pain and leg swelling.   Gastrointestinal:  Positive for abdominal pain and nausea. Negative for vomiting.   Genitourinary:  Negative for dysuria, frequency and urgency.   Musculoskeletal:  Negative for arthralgias and back pain.   Skin:  Negative for rash and wound.   Neurological:  Negative for dizziness, seizures and weakness.   Psychiatric/Behavioral:  Negative for agitation and confusion.    Objective:     Vital Signs (Most Recent):  Temp: 96 °F (35.6 °C) (05/07/22 0750)  Pulse: 68 (05/07/22 0750)  Resp: 18 (05/07/22 0750)  BP: (!) 113/56 (05/07/22 0750)  SpO2: (!) 94 % (05/07/22 0750)   Vital Signs (24h Range):  Temp:  [96 °F (35.6 °C)-98.6 °F (37 °C)] 96 °F (35.6 °C)  Pulse:  [64-75] 68  Resp:  [16-22] 18  SpO2:  [88 %-100 %] 94 %  BP: (106-121)/(55-63) 113/56     Weight: 59.8 kg (131 lb 13.4 oz)  Body mass index is 23.35 kg/m².    Intake/Output Summary (Last 24 hours) at 5/7/2022 1049  Last data filed at 5/7/2022 0500  Gross per 24 hour   Intake 240 ml   Output 300 ml   Net -60 ml      Physical Exam  Vitals and nursing note reviewed.   Constitutional:       General: She is not in acute distress.     Appearance: Normal appearance. She is well-developed. She is not ill-appearing.   HENT:       Head: Normocephalic and atraumatic.      Mouth/Throat:      Pharynx: No oropharyngeal exudate.   Eyes:      Conjunctiva/sclera: Conjunctivae normal.      Pupils: Pupils are equal, round, and reactive to light.   Neck:      Comments: No JVD visible  Cardiovascular:      Rate and Rhythm: Normal rate and regular rhythm.      Pulses: Normal pulses.      Heart sounds: Normal heart sounds.   Pulmonary:      Effort: Pulmonary effort is normal. No respiratory distress.      Breath sounds: Normal breath sounds. No wheezing.      Comments: On 2L NC. Becomes mildly sob if taken off oxygen or laid down flat  Abdominal:      General: Bowel sounds are normal. There is no distension.      Palpations: Abdomen is soft.      Tenderness: There is abdominal tenderness.   Musculoskeletal:         General: No tenderness. Normal range of motion.      Cervical back: Normal range of motion and neck supple.   Lymphadenopathy:      Cervical: No cervical adenopathy.   Skin:     General: Skin is warm and dry.      Capillary Refill: Capillary refill takes less than 2 seconds.      Findings: No rash.   Neurological:      Mental Status: She is alert and oriented to person, place, and time.      Cranial Nerves: No cranial nerve deficit.      Sensory: No sensory deficit.      Coordination: Coordination normal.   Psychiatric:         Behavior: Behavior normal.         Thought Content: Thought content normal.         Judgment: Judgment normal.       Significant Labs: All pertinent labs within the past 24 hours have been reviewed.    Significant Imaging: I have reviewed all pertinent imaging results/findings within the past 24 hours.

## 2022-05-07 NOTE — ASSESSMENT & PLAN NOTE
History of coronary artery stent placement    - continue ASA, statin  - Prasugrel held on admission

## 2022-05-07 NOTE — ED NOTES
Received report from JUAN Mccallum. Assumed care of pt.  The patient is resting quietly in ED stretcher, and is AAOx4 at this time. Respirations are even and unlabored, with no distress noted. The patient remains on continuous cardiac monitor, automated BP cuff cycling Q30 minutes, and continuous pulse oximeter. The patient is aware of POC and all questions and concerns addressed at this time. The patient was offered restroom assistance and denies need to void. The patient denies further needs and has no complaints at this time. SR raised x2, bed locked and in low position with brake engaged. Call bell within reach and the patient verbalized he/she would call for assistance if needed. Personal belongings are at bedside within reach.

## 2022-05-07 NOTE — ED TRIAGE NOTES
67 y/o presents to ER with c/c nausea and vomiting for 5 days. Pt. Was in a rehab facility of [facility] and CT scan on 5/4 revealed possible gallb ladder involvement. Advised to come to ER.

## 2022-05-07 NOTE — ASSESSMENT & PLAN NOTE
Patient with LUQ, N/V for past few months who also has a thickened GB with pericholecystic fluid    Her pain, N/V are all likely 2/2 to her complicated diverticulitis with colovesicular fistula as she has had them for past several months since she was diagnosed with this problem  Would recommend HIDA to determine if her cystic duct in patent  If HIDA is positive, would likely recommend percutaneous beatriz tube with IR as she is a high risk surgical candidate  Would recommend reaching out to Colorectal Surgery in the AM to determine if they're still planning on surgery next week    Please call with any questions or concerns

## 2022-05-07 NOTE — ASSESSMENT & PLAN NOTE
Left upper quadrant abdominal pain    - Patient with LUQ, N/V for past few months, worsened over the last few days   - US with cholelithiasis, gallbladder wall thickening and pericholecystic fluid.  No sonographic Rasmussen's sign or wall hyperemia.    - general surgery consulted, appreciate recs  - HIDA ordered to determine if her cystic duct in patent  - If HIDA is positive, would likely recommend percutaneous beatriz tube with IR as she is a high risk surgical candidate

## 2022-05-07 NOTE — ASSESSMENT & PLAN NOTE
- Continue cefepime 2grams iv and flagyl 500mg iv every 8 hours  - Monitor strict I&O   - Continue regular diet, encourage patient to drink supplements    - No elective surgery due to recent MI and HEVER on dual antiplatelet therapy.    - Continue Zofran 4mg iv every 6 hours while awake.

## 2022-05-07 NOTE — PLAN OF CARE
CRS Brief Note    66F c diverticulitis c/b colovesical and colovaginal fistula, well known to service with plan for repair on Monday.     Admitted overnight with complaints of abdominal pain and found to have acute cholecystitis. Gen Surg consulted, pending HIDA scan.     From a CRS standpoint, continue to plan for surgery on Monday. Discussed with gen surg, possibility of cholecystectomy at the same time.      For now, will continue to manage medically--  -cont abx therapy  -resume TPN  -may resume clears when able, do not advance  -plan to start bowel prep today (1L golytely today, 3L tomorrow for a slow prep)  -ok to continue ASA, but no therapeutic anticoagulation pending surgery  -ostomy consult placed for marking, which will likely need to be completed first thing Monday morning  -otherwise, will defer care to primary  -please call with questions

## 2022-05-07 NOTE — ASSESSMENT & PLAN NOTE
Left upper quadrant abdominal pain    - Patient with LUQ, N/V for past few months, worsened over the last few days   - US with cholelithiasis, gallbladder wall thickening and pericholecystic fluid.  No sonographic Rasmussen's sign or wall hyperemia.    - general surgery consulted, appreciate recs  - HIDA ordered to determine if her cystic duct in patent - unclear whether this can be done over the weekend. NPO for now pending possible scan today. If not available today can dc order and resume clear liquid diet  - Likely planning for cholecystectomy on Monday per discussion with CRS

## 2022-05-07 NOTE — HPI
Patient is 65 yo F with hx of CAD s/p 2x HEVER placement in 1/2022 (on effient), CHF (EF 20% on TTE 1/22), ICD, and colovesicular fistula 2/2 complicated diverticulitis who presents to the ED with 10 days of constant LUQ abd pain and N/V who was found on US to have pericholecystic fluid and a thickened GB, who general surgery was consulted for r/o cholecystitis. Upon interview, she admits that the pain has been present for the better part of the past few months during which she was diagnosed with complicated diverticulitis. She has also had N/V over that time so severe that she was started on PTN, and has been at rehab during this period. She denies any significant changes to these symptoms in the past few days. She also denies any fevers, chills, or hx of post-prandial pain symptoms similar to this.

## 2022-05-07 NOTE — ED NOTES
Patient identifiers for Odette Hart 66 y.o. female checked and correct.  Chief Complaint   Patient presents with    Abnormal Ct Scan     Had CT earlier showed possible acute cholecystitis. From [facility]     Past Medical History:   Diagnosis Date    Acute coronary syndrome     Allergy     Arthritis     Cardiomyopathy     CHF (congestive heart failure)     Coronary artery disease     Coronary artery disease of native artery of native heart with stable angina pectoris 4/19/2021 1/24/2022: [facility]: Cath: LAD: Proximal stent patent. LCX: Proximal 80%. LCX: Dominant. Moderate disease. LCX: HEVER 2.75 x 18 mm. Distal dissection. HEVER 2.75 x 22 mm.     Diverticulitis     Diverticulosis     Familial hypercholesterolemia 1/22/2022    Former smoker 1/24/2022    Heart attack     Heart disease     History of myocardial infarction 1/24/2022    Hyperlipidemia     Hypertension     Hypertension 1/24/2022    ICD (implantable cardioverter-defibrillator) in place     Non-ST elevation myocardial infarction (NSTEMI) 2/4/2019     Allergies reported:   Review of patient's allergies indicates:   Allergen Reactions    Augmentin [amoxicillin-pot clavulanate] Swelling     Not allergic to amoxicillin just a derivative of augmentin    Lisinopril Other (See Comments)     Fluid around heart    Losartan Other (See Comments)     High potassium    Shellfish containing products Anaphylaxis     Pt.states she is allergic to SEAFOOD since the age of 13    Levaquin [levofloxacin] Other (See Comments)     Very bad joint pain    Clindamycin Palpitations     Chest pain         LOC: Patient is awake, alert, and aware of environment with an appropriate affect. Patient is oriented x 4 and speaking appropriately.  APPEARANCE: Patient resting comfortably and in no acute distress. Patient is clean and well groomed, patient's clothing is properly fastened.  HEENT: - JVD, + midline trach. No lacerations or bruises noted to  face or scalp.  SKIN: The skin is warm and dry. Patient has normal skin turgor and moist mucus membranes.   MUSKULOSKELETAL: Patient is moving all extremities well, no obvious deformities noted. Pulses intact.   RESPIRATORY: Airway is open and patent. Respirations are spontaneous and non-labored with normal effort and rate.  CARDIAC: Patient has a normal rate and rhythm. ** on cardiac monitor. No peripheral edema noted. Pt. Has R brachial PICC line dressed with no drainage, leakage or edema. Pt. Also has L forearm 20 G peripheral IV dressed with no drainage, leakage, or edema.   ABDOMEN: No distention noted. Pt has tender to palpation pain in ULQ rated at 6/10.  NEUROLOGICAL: pupils 4 mm, PERRL. Facial expression is symmetrical. Hand grasps are equal bilaterally. Normal sensation in all extremities when touched with finger.

## 2022-05-07 NOTE — PROGRESS NOTES
General Surgery Note    Will follow up HIDA scan  If positive, recommend holding off on beatriz tube for now - will discuss with staff and Colorectal team about possible combo case to remove her GB along with her colon surgery on Monday    Please call general surgery with any questions or concerns

## 2022-05-07 NOTE — SUBJECTIVE & OBJECTIVE
Current Facility-Administered Medications on File Prior to Encounter   Medication    [MAR  - Suspended Admission] AA 5%-D15W-electrolytes 1,000 mL    [MAR  - Suspended Admission] AA 5%-D15W-electrolytes 1,000 mL    [MAR  - Suspended Admission] AA 5%-D15W-electrolytes 1,000 mL    [] AA 5%-D15W-electrolytes 2,000 mL    [MAR  - Suspended Admission] acetaminophen tablet 650 mg    [MAR  - Suspended Admission] albuterol nebulizer solution 2.5 mg    [MAR  - Suspended Admission] albuterol-ipratropium 2.5 mg-0.5 mg/3 mL nebulizer solution 3 mL    [MAR  - Suspended Admission] ALPRAZolam tablet 0.5 mg    [MAR  -  Admission] aspirin EC tablet 81 mg    [MAR  -  Admission] carvediloL tablet 12.5 mg    [MAR  -  Admission] cefepime in dextrose 5 % IVPB 2 g    [MAR  - Suspended Admission] dextrose 10% bolus 125 mL    [MAR  -  Admission] dextrose 10% bolus 250 mL    [MAR  -  Admission] dextrose 50% injection 12.5 g    [MAR  -  Admission] dicyclomine capsule 10 mg    [MAR  - Suspended Admission] fat emulsion 20% infusion 250 mL    [MAR  - Suspended Admission] fat emulsion 20% infusion 250 mL    [MAR  - Suspended Admission] furosemide injection 40 mg    [COMPLETED] furosemide injection 40 mg    [MAR  -  Admission] glucagon (human recombinant) injection 1 mg    [MAR  - Suspended Admission] hydrocortisone 2.5 % rectal cream    [MAR  - Suspended Admission] insulin aspart U-100 pen 1-10 Units    [MAR  - Suspended Admission] melatonin tablet 6 mg    [MAR  - Suspended Admission] ondansetron injection 4 mg    [MAR  - Suspended Admission] pantoprazole EC tablet 40 mg    [MAR  - Suspended Admission] promethazine tablet 25 mg    [MAR  - Suspended Admission] scopolamine 1.3-1.5 mg (1 mg over 3 days) 1 patch    [MAR  -  Admission] simethicone chewable tablet 80 mg     [MAR Hold - Suspended Admission] sodium chloride 0.9% flush 10 mL    [MAR Hold - Suspended Admission] sodium chloride 0.9% flush 10 mL    And    [MAR Hold - Suspended Admission] sodium chloride 0.9% flush 10 mL    [MAR Hold - Suspended Admission] traMADoL tablet 50 mg    [DISCONTINUED] AA 5%-D15W-electrolytes 2,000 mL    [DISCONTINUED] AA 5%-D15W-electrolytes 2,000 mL    [DISCONTINUED] AA 5%-D15W-electrolytes 2,000 mL     Current Outpatient Medications on File Prior to Encounter   Medication Sig    acetaminophen (TYLENOL) 500 MG tablet Take 2 tablets (1,000 mg total) by mouth every 8 (eight) hours as needed for Pain.    albuterol (PROVENTIL/VENTOLIN HFA) 90 mcg/actuation inhaler Inhale 2 puffs into the lungs every 6 (six) hours as needed for Wheezing. Rescue    ALPRAZolam (XANAX) 0.5 MG tablet Take 1 tablet (0.5 mg total) by mouth nightly as needed for Anxiety.    aspirin (ECOTRIN) 81 MG EC tablet Take 1 tablet (81 mg total) by mouth once daily.    carvediloL (COREG) 12.5 MG tablet Take 1 tablet (12.5 mg total) by mouth 2 (two) times daily with meals.    dicyclomine (BENTYL) 10 MG capsule Take 1 capsule (10 mg total) by mouth 4 (four) times daily as needed.    ezetimibe (ZETIA) 10 mg tablet Take 1 tablet (10 mg total) by mouth every evening.    fluticasone propionate (FLONASE) 50 mcg/actuation nasal spray 1 spray (50 mcg total) by Each Nostril route once daily.    Lactobacillus rhamnosus GG (CULTURELLE) 10 billion cell capsule Take 1 capsule by mouth once daily.    metronidazole (FLAGYL) 50 mg/mL Syrg Take 10 mLs (500 mg total) by mouth every 8 (eight) hours.    nitroGLYCERIN (NITROSTAT) 0.4 MG SL tablet Place 1 tablet (0.4 mg total) under the tongue every 5 (five) minutes as needed for Chest pain. (Patient not taking: Reported on 4/20/2022)    omeprazole (PRILOSEC) 40 MG capsule Take 1 capsule (40 mg total) by mouth once daily.    prasugreL (EFFIENT) 10 mg Tab Take 1 tablet (10 mg total) by mouth once daily.     promethazine (PROMETHEGAN) 25 MG suppository Place 1 suppository (25 mg total) rectally every 6 (six) hours as needed for Nausea.    simethicone (MYLICON) 80 MG chewable tablet Take 80 mg by mouth every 8 (eight) hours as needed.    spironolactone (ALDACTONE) 25 MG tablet Take 1 tablet (25 mg total) by mouth once daily.       Review of patient's allergies indicates:   Allergen Reactions    Augmentin [amoxicillin-pot clavulanate] Swelling     Not allergic to amoxicillin just a derivative of augmentin    Lisinopril Other (See Comments)     Fluid around heart    Losartan Other (See Comments)     High potassium    Shellfish containing products Anaphylaxis     Pt.states she is allergic to SEAFOOD since the age of 13    Levaquin [levofloxacin] Other (See Comments)     Very bad joint pain    Clindamycin Palpitations     Chest pain       Past Medical History:   Diagnosis Date    Acute coronary syndrome     Allergy     Arthritis     Cardiomyopathy     CHF (congestive heart failure)     Coronary artery disease     Coronary artery disease of native artery of native heart with stable angina pectoris 2021: [facility]: Cath: LAD: Proximal stent patent. LCX: Proximal 80%. LCX: Dominant. Moderate disease. LCX: HEVER 2.75 x 18 mm. Distal dissection. HEVER 2.75 x 22 mm.     Diverticulitis     Diverticulosis     Familial hypercholesterolemia 2022    Former smoker 2022    Heart attack     Heart disease     History of myocardial infarction 2022    Hyperlipidemia     Hypertension     Hypertension 2022    ICD (implantable cardioverter-defibrillator) in place     Non-ST elevation myocardial infarction (NSTEMI) 2019     Past Surgical History:   Procedure Laterality Date    ATRIAL CARDIAC PACEMAKER INSERTION       SECTION      CORONARY STENT PLACEMENT      LEFT HEART CATHETERIZATION Left 2022    Procedure: CATHETERIZATION, HEART, LEFT;  Surgeon: Yohannes Cordova MD;  Location: [facility] CATH LAB;   Service: Cardiology;  Laterality: Left;     Family History       Problem Relation (Age of Onset)    Emphysema Father    Heart attack Brother    Heart disease Mother          Tobacco Use    Smoking status: Former Smoker     Packs/day: 0.50     Start date: 1976     Quit date: 2012     Years since quittin.4    Smokeless tobacco: Never Used   Substance and Sexual Activity    Alcohol use: No    Drug use: No    Sexual activity: Not on file     Review of Systems   Constitutional:  Positive for appetite change and unexpected weight change. Negative for chills and fever.   HENT:  Negative for sore throat and trouble swallowing.    Eyes:  Negative for discharge and redness.   Respiratory:  Negative for chest tightness and shortness of breath.    Cardiovascular:  Negative for chest pain.   Gastrointestinal:  Positive for abdominal pain, diarrhea, nausea and vomiting. Negative for constipation.   Genitourinary:  Negative for dysuria.   Musculoskeletal:  Negative for back pain and neck pain.   Skin:  Negative for color change.   Allergic/Immunologic: Negative for immunocompromised state.   Neurological:  Negative for dizziness and headaches.   Hematological:  Negative for adenopathy.   Objective:     Vital Signs (Most Recent):  Temp: 98 °F (36.7 °C) (22)  Pulse: 72 (22)  Resp: 20 (22)  BP: (!) 112/56 (22)  SpO2: 95 % (22)   Vital Signs (24h Range):  Temp:  [96 °F (35.6 °C)-98.6 °F (37 °C)] 98 °F (36.7 °C)  Pulse:  [69-79] 72  Resp:  [17-22] 20  SpO2:  [88 %-100 %] 95 %  BP: (106-119)/(55-63) 112/56     Weight: 60.3 kg (133 lb)  Body mass index is 23.56 kg/m².    Physical Exam  Vitals and nursing note reviewed.   Constitutional:       General: She is not in acute distress.     Appearance: She is well-developed. She is not diaphoretic.   HENT:      Head: Normocephalic and atraumatic.   Eyes:      Pupils: Pupils are equal, round, and reactive to light.    Cardiovascular:      Rate and Rhythm: Normal rate and regular rhythm.   Pulmonary:      Effort: Pulmonary effort is normal. No respiratory distress.   Abdominal:      General: There is no distension.      Palpations: Abdomen is soft.      Tenderness: There is abdominal tenderness. There is no guarding.      Comments: Abd soft moderately tender in LUQ and left flank, non-tender in RUQ to deep palpation, Rasmussen's negative   Musculoskeletal:         General: Normal range of motion.      Cervical back: Normal range of motion and neck supple.   Skin:     General: Skin is warm and dry.   Neurological:      Mental Status: She is alert and oriented to person, place, and time.   Psychiatric:         Behavior: Behavior normal.         Thought Content: Thought content normal.         Judgment: Judgment normal.       Significant Labs:  CBC:   Recent Labs   Lab 05/06/22  2150 05/06/22  2155   WBC 2.00*  --    RBC 5.92*  --    HGB 16.1*  --    HCT 50.6* 46   *  --    MCV 86  --    MCH 27.2  --    MCHC 31.8*  --      BMP:   Recent Labs   Lab 05/06/22  0420   *   *   K 4.9      CO2 23   BUN 22   CREATININE 0.6   CALCIUM 8.3*   MG 1.9       Significant Diagnostics:  I have reviewed all pertinent imaging results/findings within the past 24 hours.    US showed pericholecystic fluid and a thickened GB wall, with gallstones, CBD 1mm

## 2022-05-07 NOTE — ASSESSMENT & PLAN NOTE
- Hypoxic requiring 2 L today. Reports receiving IV lasix yesterday, not apparent on MAR, may have received at OSH.  - F/u CXR, consider additional IV diuresis today  - Continue Coreg 12.5mg BID, lasix 40 mg daily  - strict I&Os, daily weights

## 2022-05-07 NOTE — ASSESSMENT & PLAN NOTE
- unclear if pt has been eating with nitrition shakes or if on TPN-- f/u in AM with [facility]  - Nutrition consulted  - Body mass index is 23.43 kg/m².

## 2022-05-07 NOTE — PROGRESS NOTES
[facility] Medicine  Progress Note    Patient Name: Odette Hart  MRN: [id number]  Patient Class: OP- Observation   Admission Date: 5/6/2022  Length of Stay: 0 days  Attending Physician: Robson Tiwari MD  Primary Care Provider: Braxton Barragan MD        Subjective:     Principal Problem:Calculus of gallbladder with cholecystitis without biliary obstruction        HPI:  Odette Hart is a 66 y.o. 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 Patient was at [facility] and has been complaining of constant LUQ abdominal pain, poor PO intake, and N/V. She has been having abdominal pain, nausea/vomiting, and inability to tolerate PO since she was diagnosed with diverticulitis. They thought her symptoms were due to flagyl and it was discontinued with improvement, however her symptoms returned 10 days ago. She had a CT of her abdomen on 05/04 which showed improving diverticulitis, persistent colovesicular fistula and distended gallbladder with pericholecystic fluid. She denies any fevers, chills, chest pain, dysuria.    General surgery consulted in ED and recommend HIDA.        Overview/Hospital Course:  No notes on file    Interval History:   NAEON. This morning Ms. Hart feels improved, notes breathing has improved since receiving IV lasix yesterday, still feeling sob when she removes her oxygen or transfers to the commode. Able to lie flat with mild worsening dyspnea.     Colorectal surgery present in room to discuss plan. Will restart tpn today, npo if HIDA scan can be done today otherwise resume clear liquids. Prep Sunday night for combined colorectal intervention and cholecystectomy on Monday.    Review of Systems   Constitutional:  Negative for chills and fever.   HENT:  Negative for congestion and rhinorrhea.     Respiratory:  Positive for shortness of breath. Negative for chest tightness.    Cardiovascular:  Negative for chest pain and leg swelling.   Gastrointestinal:  Positive for abdominal pain and nausea. Negative for vomiting.   Genitourinary:  Negative for dysuria, frequency and urgency.   Musculoskeletal:  Negative for arthralgias and back pain.   Skin:  Negative for rash and wound.   Neurological:  Negative for dizziness, seizures and weakness.   Psychiatric/Behavioral:  Negative for agitation and confusion.    Objective:     Vital Signs (Most Recent):  Temp: 96 °F (35.6 °C) (05/07/22 0750)  Pulse: 68 (05/07/22 0750)  Resp: 18 (05/07/22 0750)  BP: (!) 113/56 (05/07/22 0750)  SpO2: (!) 94 % (05/07/22 0750)   Vital Signs (24h Range):  Temp:  [96 °F (35.6 °C)-98.6 °F (37 °C)] 96 °F (35.6 °C)  Pulse:  [64-75] 68  Resp:  [16-22] 18  SpO2:  [88 %-100 %] 94 %  BP: (106-121)/(55-63) 113/56     Weight: 59.8 kg (131 lb 13.4 oz)  Body mass index is 23.35 kg/m².    Intake/Output Summary (Last 24 hours) at 5/7/2022 1049  Last data filed at 5/7/2022 0500  Gross per 24 hour   Intake 240 ml   Output 300 ml   Net -60 ml      Physical Exam  Vitals and nursing note reviewed.   Constitutional:       General: She is not in acute distress.     Appearance: Normal appearance. She is well-developed. She is not ill-appearing.   HENT:      Head: Normocephalic and atraumatic.      Mouth/Throat:      Pharynx: No oropharyngeal exudate.   Eyes:      Conjunctiva/sclera: Conjunctivae normal.      Pupils: Pupils are equal, round, and reactive to light.   Neck:      Comments: No JVD visible  Cardiovascular:      Rate and Rhythm: Normal rate and regular rhythm.      Pulses: Normal pulses.      Heart sounds: Normal heart sounds.   Pulmonary:      Effort: Pulmonary effort is normal. No respiratory distress.      Breath sounds: Normal breath sounds. No wheezing.      Comments: On 2L NC. Becomes mildly sob if taken off oxygen or laid down  flat  Abdominal:      General: Bowel sounds are normal. There is no distension.      Palpations: Abdomen is soft.      Tenderness: There is abdominal tenderness.   Musculoskeletal:         General: No tenderness. Normal range of motion.      Cervical back: Normal range of motion and neck supple.   Lymphadenopathy:      Cervical: No cervical adenopathy.   Skin:     General: Skin is warm and dry.      Capillary Refill: Capillary refill takes less than 2 seconds.      Findings: No rash.   Neurological:      Mental Status: She is alert and oriented to person, place, and time.      Cranial Nerves: No cranial nerve deficit.      Sensory: No sensory deficit.      Coordination: Coordination normal.   Psychiatric:         Behavior: Behavior normal.         Thought Content: Thought content normal.         Judgment: Judgment normal.       Significant Labs: All pertinent labs within the past 24 hours have been reviewed.    Significant Imaging: I have reviewed all pertinent imaging results/findings within the past 24 hours.      Assessment/Plan:      * Calculus of gallbladder with cholecystitis without biliary obstruction  Left upper quadrant abdominal pain    - Patient with LUQ, N/V for past few months, worsened over the last few days   - US with cholelithiasis, gallbladder wall thickening and pericholecystic fluid.  No sonographic Rasmussen's sign or wall hyperemia.    - general surgery consulted, appreciate recs  - HIDA ordered to determine if her cystic duct in patent - unclear whether this can be done over the weekend. NPO for now pending possible scan today. If not available today can dc order and resume clear liquid diet  - Likely planning for cholecystectomy on Monday per discussion with CRS    Anemia  Stable. Blood smear notable for Rouleaux formation  - F/u SPEP, UPEP      Severe malnutrition  - resume tpn  - Nutrition consulted    Urinary tract infection with pyuria  Pseudomonas / Enterobacteroid acute cystitis without  hematuria    - Continue Cefepime 2grams iv and Flagyl 500mg iv every 8 hours    Colovesical fistula  - Continue cefepime 2grams iv and flagyl 500mg iv every 8 hours  - Monitor strict I&O   - Continue Zofran 4mg iv every 6 hours while awake.\  - CRS following, recs appreciated. Per discussion today, planning for corrective surgery on Monday with cholecystectomy.    Chronic combined systolic and diastolic congestive heart failure  - Hypoxic requiring 2 L today. Reports receiving IV lasix yesterday, not apparent on MAR, may have received at OSH.  - F/u CXR, consider additional IV diuresis today  - Continue Coreg 12.5mg BID, lasix 40 mg daily  - strict I&Os, daily weights    Primary hypertension  - Continue Coreg 12.5mg BID    Familial hypercholesterolemia  - continue statin    Coronary artery disease of native artery of native heart with stable angina pectoris  History of coronary artery stent placement    - continue ASA, statin  - Prasugrel held on admission    Acute diverticulitis  Acute on chronic diverticulitis     - Soft diet  - see above  - minimal oral intake on TPN      VTE Risk Mitigation (From admission, onward)         Ordered     enoxaparin injection 40 mg  Daily         05/06/22 2358     IP VTE HIGH RISK PATIENT  Once         05/06/22 2358     Place sequential compression device  Until discontinued         05/06/22 2358                Discharge Planning   SIVAN:      Code Status: Full Code   Is the patient medically ready for discharge?:     Reason for patient still in hospital (select all that apply): Patient trending condition                     Robson Tiwari MD  Department of Hospital Medicine   [place] - Surgery

## 2022-05-07 NOTE — H&P
[facility] - Emergency Dept  [facility] Medicine  History & Physical    Patient Name: Odette Hart  MRN: [id number]  Patient Class: OP- Observation  Admission Date: 5/6/2022  Attending Physician: Josee Hammond MD   Primary Care Provider: Braxton Barragan MD         Patient information was obtained from patient, past medical records and ER records.     Subjective:     Principal Problem:Acute cholecystitis    Chief Complaint:   Chief Complaint   Patient presents with    Abnormal Ct Scan     Had CT earlier showed possible acute cholecystitis. From [facility]        HPI: Odette Hart is a 66 y.o. 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 Patient was at [facility] and has been complaining of constant LUQ abdominal pain, poor PO intake, and N/V. She has been having abdominal pain, nausea/vomiting, and inability to tolerate PO since she was diagnosed with diverticulitis. They thought her symptoms were due to flagyl and it was discontinued with improvement, however her symptoms returned 10 days ago. She had a CT of her abdomen on 05/04 which showed improving diverticulitis, persistent colovesicular fistula and distended gallbladder with pericholecystic fluid. She denies any fevers, chills, chest pain, dysuria.    General surgery consulted in ED and recommend HIDA.        Past Medical History:   Diagnosis Date    Acute coronary syndrome     Allergy     Arthritis     Cardiomyopathy     CHF (congestive heart failure)     Coronary artery disease     Coronary artery disease of native artery of native heart with stable angina pectoris 4/19/2021 1/24/2022: [facility]: Cath: LAD: Proximal stent patent. LCX: Proximal 80%. LCX: Dominant. Moderate disease. LCX: HEVER 2.75 x 18 mm. Distal dissection. HEVER 2.75 x 22 mm.     Diverticulitis      Diverticulosis     Familial hypercholesterolemia 2022    Former smoker 2022    Heart attack     Heart disease     History of myocardial infarction 2022    Hyperlipidemia     Hypertension     Hypertension 2022    ICD (implantable cardioverter-defibrillator) in place     Non-ST elevation myocardial infarction (NSTEMI) 2019       Past Surgical History:   Procedure Laterality Date    ATRIAL CARDIAC PACEMAKER INSERTION       SECTION      CORONARY STENT PLACEMENT      LEFT HEART CATHETERIZATION Left 2022    Procedure: CATHETERIZATION, HEART, LEFT;  Surgeon: Yohannes Cordova MD;  Location: [facility] CATH LAB;  Service: Cardiology;  Laterality: Left;       Review of patient's allergies indicates:   Allergen Reactions    Augmentin [amoxicillin-pot clavulanate] Swelling     Not allergic to amoxicillin just a derivative of augmentin    Lisinopril Other (See Comments)     Fluid around heart    Losartan Other (See Comments)     High potassium    Shellfish containing products Anaphylaxis     Pt.states she is allergic to SEAFOOD since the age of 13    Levaquin [levofloxacin] Other (See Comments)     Very bad joint pain    Clindamycin Palpitations     Chest pain       Current Facility-Administered Medications on File Prior to Encounter   Medication    [MAR Hold - Suspended Admission] AA 5%-D15W-electrolytes 1,000 mL    [MAR Hold - Suspended Admission] AA 5%-D15W-electrolytes 1,000 mL    [MAR Hold - Suspended Admission] AA 5%-D15W-electrolytes 1,000 mL    [MAR Hold - Suspended Admission] acetaminophen tablet 650 mg    [MAR Hold - Suspended Admission] albuterol nebulizer solution 2.5 mg    [MAR Hold - Suspended Admission] albuterol-ipratropium 2.5 mg-0.5 mg/3 mL nebulizer solution 3 mL    [MAR Hold - Suspended Admission] ALPRAZolam tablet 0.5 mg    [MAR Hold - Suspended Admission] aspirin EC tablet 81 mg    [MAR Hold - Suspended Admission] carvediloL tablet 12.5 mg     [MAR Hold - Suspended Admission] cefepime in dextrose 5 % IVPB 2 g    [MAR Hold - Suspended Admission] dextrose 10% bolus 125 mL    [MAR Hold - Suspended Admission] dextrose 10% bolus 250 mL    [MAR Hold - Suspended Admission] dextrose 50% injection 12.5 g    [MAR Hold - Suspended Admission] dicyclomine capsule 10 mg    [MAR Hold - Suspended Admission] fat emulsion 20% infusion 250 mL    [MAR Hold - Suspended Admission] fat emulsion 20% infusion 250 mL    [MAR Hold - Suspended Admission] furosemide injection 40 mg    [COMPLETED] furosemide injection 40 mg    [MAR Hold - Suspended Admission] glucagon (human recombinant) injection 1 mg    [MAR Hold - Suspended Admission] hydrocortisone 2.5 % rectal cream    [MAR Hold - Suspended Admission] insulin aspart U-100 pen 1-10 Units    [MAR Hold - Suspended Admission] melatonin tablet 6 mg    [MAR Hold - Suspended Admission] ondansetron injection 4 mg    [MAR Hold - Suspended Admission] pantoprazole EC tablet 40 mg    [MAR Hold - Suspended Admission] promethazine tablet 25 mg    [MAR Hold - Suspended Admission] scopolamine 1.3-1.5 mg (1 mg over 3 days) 1 patch    [MAR Hold - Suspended Admission] simethicone chewable tablet 80 mg    [MAR Hold - Suspended Admission] sodium chloride 0.9% flush 10 mL    [MAR Hold - Suspended Admission] sodium chloride 0.9% flush 10 mL    And    [MAR Hold - Suspended Admission] sodium chloride 0.9% flush 10 mL    [MAR Hold - Suspended Admission] traMADoL tablet 50 mg    [DISCONTINUED] AA 5%-D15W-electrolytes 2,000 mL    [DISCONTINUED] AA 5%-D15W-electrolytes 2,000 mL    [DISCONTINUED] AA 5%-D15W-electrolytes 2,000 mL     Current Outpatient Medications on File Prior to Encounter   Medication Sig    acetaminophen (TYLENOL) 500 MG tablet Take 2 tablets (1,000 mg total) by mouth every 8 (eight) hours as needed for Pain.    albuterol (PROVENTIL/VENTOLIN HFA) 90 mcg/actuation inhaler Inhale 2 puffs into the lungs every 6 (six)  hours as needed for Wheezing. Rescue    ALPRAZolam (XANAX) 0.5 MG tablet Take 1 tablet (0.5 mg total) by mouth nightly as needed for Anxiety.    aspirin (ECOTRIN) 81 MG EC tablet Take 1 tablet (81 mg total) by mouth once daily.    carvediloL (COREG) 12.5 MG tablet Take 1 tablet (12.5 mg total) by mouth 2 (two) times daily with meals.    dicyclomine (BENTYL) 10 MG capsule Take 1 capsule (10 mg total) by mouth 4 (four) times daily as needed.    ezetimibe (ZETIA) 10 mg tablet Take 1 tablet (10 mg total) by mouth every evening.    fluticasone propionate (FLONASE) 50 mcg/actuation nasal spray 1 spray (50 mcg total) by Each Nostril route once daily.    Lactobacillus rhamnosus GG (CULTURELLE) 10 billion cell capsule Take 1 capsule by mouth once daily.    metronidazole (FLAGYL) 50 mg/mL Syrg Take 10 mLs (500 mg total) by mouth every 8 (eight) hours.    nitroGLYCERIN (NITROSTAT) 0.4 MG SL tablet Place 1 tablet (0.4 mg total) under the tongue every 5 (five) minutes as needed for Chest pain. (Patient not taking: Reported on 2022)    omeprazole (PRILOSEC) 40 MG capsule Take 1 capsule (40 mg total) by mouth once daily.    prasugreL (EFFIENT) 10 mg Tab Take 1 tablet (10 mg total) by mouth once daily.    promethazine (PROMETHEGAN) 25 MG suppository Place 1 suppository (25 mg total) rectally every 6 (six) hours as needed for Nausea.    simethicone (MYLICON) 80 MG chewable tablet Take 80 mg by mouth every 8 (eight) hours as needed.    spironolactone (ALDACTONE) 25 MG tablet Take 1 tablet (25 mg total) by mouth once daily.     Family History       Problem Relation (Age of Onset)    Emphysema Father    Heart attack Brother    Heart disease Mother          Tobacco Use    Smoking status: Former Smoker     Packs/day: 0.50     Start date: 1976     Quit date: 2012     Years since quittin.4    Smokeless tobacco: Never Used   Substance and Sexual Activity    Alcohol use: No    Drug use: No    Sexual  activity: Not on file     Review of Systems   Constitutional:  Negative for chills and fever.   HENT:  Negative for congestion and rhinorrhea.    Respiratory:  Negative for chest tightness and shortness of breath.    Cardiovascular:  Negative for chest pain and leg swelling.   Gastrointestinal:  Positive for abdominal pain, nausea and vomiting.   Genitourinary:  Negative for dysuria, frequency and urgency.   Musculoskeletal:  Negative for arthralgias and back pain.   Skin:  Negative for rash and wound.   Neurological:  Negative for dizziness, seizures and weakness.   Psychiatric/Behavioral:  Negative for agitation and confusion.    Objective:     Vital Signs (Most Recent):  Temp: 97.7 °F (36.5 °C) (05/06/22 2300)  Pulse: 66 (05/07/22 0100)  Resp: 18 (05/07/22 0100)  BP: (!) 121/58 (05/07/22 0100)  SpO2: 96 % (05/07/22 0100)   Vital Signs (24h Range):  Temp:  [96 °F (35.6 °C)-98.6 °F (37 °C)] 97.7 °F (36.5 °C)  Pulse:  [65-79] 66  Resp:  [17-22] 18  SpO2:  [88 %-100 %] 96 %  BP: (106-121)/(55-63) 121/58     Weight: 60.3 kg (133 lb)  Body mass index is 23.56 kg/m².    Physical Exam  Vitals and nursing note reviewed.   Constitutional:       General: She is not in acute distress.     Appearance: She is well-developed.   HENT:      Head: Normocephalic and atraumatic.      Mouth/Throat:      Pharynx: No oropharyngeal exudate.   Eyes:      Conjunctiva/sclera: Conjunctivae normal.      Pupils: Pupils are equal, round, and reactive to light.   Cardiovascular:      Rate and Rhythm: Normal rate and regular rhythm.      Heart sounds: Normal heart sounds.   Pulmonary:      Effort: Pulmonary effort is normal. No respiratory distress.      Breath sounds: Normal breath sounds. No wheezing.   Abdominal:      General: Bowel sounds are normal. There is no distension.      Palpations: Abdomen is soft.      Tenderness: There is abdominal tenderness.   Musculoskeletal:         General: No tenderness. Normal range of motion.      Cervical  back: Normal range of motion and neck supple.   Lymphadenopathy:      Cervical: No cervical adenopathy.   Skin:     General: Skin is warm and dry.      Capillary Refill: Capillary refill takes less than 2 seconds.      Findings: No rash.   Neurological:      Mental Status: She is alert and oriented to person, place, and time.      Cranial Nerves: No cranial nerve deficit.      Sensory: No sensory deficit.      Coordination: Coordination normal.   Psychiatric:         Behavior: Behavior normal.         Thought Content: Thought content normal.         Judgment: Judgment normal.         CRANIAL NERVES     CN III, IV, VI   Pupils are equal, round, and reactive to light.     Significant Labs: All pertinent labs within the past 24 hours have been reviewed.  Bilirubin:   Recent Labs   Lab 04/29/22  0326 05/01/22  0500 05/02/22  0445 05/04/22  0540 05/06/22  0420 05/06/22  2150   BILIDIR 0.3  --  0.3 0.4* 0.5*  --    BILITOT 0.5 0.8 0.6 0.7 0.7 1.2*     CBC:   Recent Labs   Lab 05/06/22  0420 05/06/22  2150 05/06/22  2155   WBC 5.74 2.00*  --    HGB 9.9* 16.1*  --    HCT 32.6* 50.6* 46    139*  --      CMP:   Recent Labs   Lab 05/05/22  0515 05/06/22  0420 05/06/22  2150   * 131* 135*   K 4.3 4.9 3.8    102 99   CO2 24 23 27   * 117* 78   BUN 20 22 20   CREATININE 0.6 0.6 0.6   CALCIUM 8.0* 8.3* 8.3*   PROT  --  6.9 7.0   ALBUMIN  --  2.0* 2.2*   BILITOT  --  0.7 1.2*   ALKPHOS  --  79 85   AST  --  37 46*   ALT  --  22 25   ANIONGAP 5* 6* 9   EGFRNONAA >60.0 >60.0 >60.0       Significant Imaging: I have reviewed all pertinent imaging results/findings within the past 24 hours.  US Abdomen Limited  Narrative: EXAMINATION:  US ABDOMEN LIMITED    CLINICAL HISTORY:  Acute cholecystits on CT scan;.    TECHNIQUE:  Limited ultrasound of the right upper quadrant of the abdomen including pancreas, liver, gallbladder, common bile duct was performed.    COMPARISON:  CT abdomen pelvis  05/04/2022.    FINDINGS:  Liver: Upper normal in size, measuring 18.5 cm. Homogeneous echotexture. No focal hepatic lesions.    Gallbladder: Gallstones are seen.  There is gallbladder wall thickening measuring up to 8 mm.  There is pericholecystic fluid.  No sonographic Rasmussen's sign or hypervascularity.    Biliary system: The common duct is not dilated, measuring 1 mm.  No intrahepatic ductal dilatation.    Spleen: Upper normal in size with a homogeneous echotexture, measuring 11.7 x 5 cm.    Pancreas: The visualized portions of pancreas appear normal.    Miscellaneous: Bilateral pleural effusions.  No hydronephrosis on either side.  Impression: Cholelithiasis, gallbladder wall thickening and pericholecystic fluid.  No sonographic Rasmussen's sign or wall hyperemia.  These findings are equivocal for acute cholecystitis.  Similar findings could be seen in setting of congestive heart failure or hepatic dysfunction.  If clinical concern persists for acute cholecystitis, HIDA scan may provide further information.    Bilateral pleural effusions.    This report was flagged in Epic as abnormal.    Electronically signed by resident: Marixa Santillan MD  Date:    05/05/2022  Time:    18:52    Electronically signed by: Doug Caballero MD  Date:    05/05/2022  Time:    19:01      Assessment/Plan:     * Calculus of gallbladder with cholecystitis without biliary obstruction  Left upper quadrant abdominal pain    - Patient with LUQ, N/V for past few months, worsened over the last few days   - US with cholelithiasis, gallbladder wall thickening and pericholecystic fluid.  No sonographic Rasmussen's sign or wall hyperemia.    - general surgery consulted, appreciate recs  - HIDA ordered to determine if her cystic duct in patent  - If HIDA is positive, would likely recommend percutaneous beatriz tube with IR as she is a high risk surgical candidate    Colovesical fistula  - Continue cefepime 2grams iv and flagyl 500mg iv every 8 hours  -  Monitor strict I&O   - Continue regular diet, encourage patient to drink supplements    - No elective surgery due to recent MI and HEVER on dual antiplatelet therapy.    - Continue Zofran 4mg iv every 6 hours while awake.    Acute diverticulitis  Acute on chronic diverticulitis     - Soft diet  - see above  - minimal oral intake on TPN    Severe malnutrition  - unclear if pt has been eating with nitrition shakes or if on TPN-- f/u in AM with [facility]  - Nutrition consulted  - Body mass index is 23.43 kg/m².    Urinary tract infection with pyuria  Pseudomonas / Enterobacteroid acute cystitis without hematuria    - Continue Cefepime 2grams iv and Flagyl 500mg iv every 8 hours    Chronic combined systolic and diastolic congestive heart failure  - Continue Coreg 12.5mg BID, lasix 40 mg daily  - strict I&Os    Primary hypertension  - Continue Coreg 12.5mg BID    Familial hypercholesterolemia  - continue statin    Coronary artery disease of native artery of native heart with stable angina pectoris  History of coronary artery stent placement    - continue ASA, statin    VTE Risk Mitigation (From admission, onward)         Ordered     enoxaparin injection 40 mg  Daily         05/06/22 2358     IP VTE HIGH RISK PATIENT  Once         05/06/22 2358     Place sequential compression device  Until discontinued         05/06/22 2358                   Yessi Post PA-C  Department of Hospital Medicine   [facility] - Emergency Dept

## 2022-05-07 NOTE — CONSULTS
[facility] - Emergency Dept  General Surgery  Consult Note    Patient Name: Odette Hart  MRN: [id number]  Code Status: Prior  Admission Date: 2022  Hospital Length of Stay: 0 days  Attending Physician: Josee Hammond MD  Primary Care Provider: Braxton Barragan MD    Patient information was obtained from patient, past medical records and ER records.     Inpatient consult to General surgery  Consult performed by: Joseph Larson MD  Consult ordered by: Josee Hammond MD  Reason for consult: Abd pain        Subjective:     Principal Problem: <principal problem not specified>    History of Present Illness: Patient is 67 yo F with hx of CAD s/p 2x HEVER placement in 2022 (on effient), CHF (EF 20% on TTE ), ICD, and colovesicular fistula 2/2 complicated diverticulitis who presents to the ED with 10 days of constant LUQ abd pain and N/V who was found on US to have pericholecystic fluid and a thickened GB, who general surgery was consulted for r/o cholecystitis. Upon interview, she admits that the pain has been present for the better part of the past few months during which she was diagnosed with complicated diverticulitis. She has also had N/V over that time so severe that she was started on PTN, and has been at rehab during this period. She denies any significant changes to these symptoms in the past few days. She also denies any fevers, chills, or hx of post-prandial pain symptoms similar to this.      Current Facility-Administered Medications on File Prior to Encounter   Medication    [MAR Hold - Suspended Admission] AA 5%-D15W-electrolytes 1,000 mL    [MAR Hold - Suspended Admission] AA 5%-D15W-electrolytes 1,000 mL    [MAR Hold - Suspended Admission] AA 5%-D15W-electrolytes 1,000 mL    [] AA 5%-D15W-electrolytes 2,000 mL    [MAR Hold - Suspended Admission] acetaminophen tablet 650 mg    [MAR Hold - Suspended Admission] albuterol nebulizer solution 2.5 mg    [MAR Hold - Suspended Admission]  albuterol-ipratropium 2.5 mg-0.5 mg/3 mL nebulizer solution 3 mL    [MAR Hold - Suspended Admission] ALPRAZolam tablet 0.5 mg    [MAR Hold - Suspended Admission] aspirin EC tablet 81 mg    [MAR Hold - Suspended Admission] carvediloL tablet 12.5 mg    [MAR Hold - Suspended Admission] cefepime in dextrose 5 % IVPB 2 g    [MAR Hold - Suspended Admission] dextrose 10% bolus 125 mL    [MAR Hold - Suspended Admission] dextrose 10% bolus 250 mL    [MAR Hold - Suspended Admission] dextrose 50% injection 12.5 g    [MAR Hold - Suspended Admission] dicyclomine capsule 10 mg    [MAR Hold - Suspended Admission] fat emulsion 20% infusion 250 mL    [MAR Hold - Suspended Admission] fat emulsion 20% infusion 250 mL    [MAR Hold - Suspended Admission] furosemide injection 40 mg    [COMPLETED] furosemide injection 40 mg    [MAR Hold - Suspended Admission] glucagon (human recombinant) injection 1 mg    [MAR Hold - Suspended Admission] hydrocortisone 2.5 % rectal cream    [MAR Hold - Suspended Admission] insulin aspart U-100 pen 1-10 Units    [MAR Hold - Suspended Admission] melatonin tablet 6 mg    [MAR Hold - Suspended Admission] ondansetron injection 4 mg    [MAR Hold - Suspended Admission] pantoprazole EC tablet 40 mg    [MAR Hold - Suspended Admission] promethazine tablet 25 mg    [MAR Hold - Suspended Admission] scopolamine 1.3-1.5 mg (1 mg over 3 days) 1 patch    [MAR Hold - Suspended Admission] simethicone chewable tablet 80 mg    [MAR Hold - Suspended Admission] sodium chloride 0.9% flush 10 mL    [MAR Hold - Suspended Admission] sodium chloride 0.9% flush 10 mL    And    [MAR Hold - Suspended Admission] sodium chloride 0.9% flush 10 mL    [MAR Hold - Suspended Admission] traMADoL tablet 50 mg    [DISCONTINUED] AA 5%-D15W-electrolytes 2,000 mL    [DISCONTINUED] AA 5%-D15W-electrolytes 2,000 mL    [DISCONTINUED] AA 5%-D15W-electrolytes 2,000 mL     Current Outpatient Medications on File Prior to  Encounter   Medication Sig    acetaminophen (TYLENOL) 500 MG tablet Take 2 tablets (1,000 mg total) by mouth every 8 (eight) hours as needed for Pain.    albuterol (PROVENTIL/VENTOLIN HFA) 90 mcg/actuation inhaler Inhale 2 puffs into the lungs every 6 (six) hours as needed for Wheezing. Rescue    ALPRAZolam (XANAX) 0.5 MG tablet Take 1 tablet (0.5 mg total) by mouth nightly as needed for Anxiety.    aspirin (ECOTRIN) 81 MG EC tablet Take 1 tablet (81 mg total) by mouth once daily.    carvediloL (COREG) 12.5 MG tablet Take 1 tablet (12.5 mg total) by mouth 2 (two) times daily with meals.    dicyclomine (BENTYL) 10 MG capsule Take 1 capsule (10 mg total) by mouth 4 (four) times daily as needed.    ezetimibe (ZETIA) 10 mg tablet Take 1 tablet (10 mg total) by mouth every evening.    fluticasone propionate (FLONASE) 50 mcg/actuation nasal spray 1 spray (50 mcg total) by Each Nostril route once daily.    Lactobacillus rhamnosus GG (CULTURELLE) 10 billion cell capsule Take 1 capsule by mouth once daily.    metronidazole (FLAGYL) 50 mg/mL Syrg Take 10 mLs (500 mg total) by mouth every 8 (eight) hours.    nitroGLYCERIN (NITROSTAT) 0.4 MG SL tablet Place 1 tablet (0.4 mg total) under the tongue every 5 (five) minutes as needed for Chest pain. (Patient not taking: Reported on 4/20/2022)    omeprazole (PRILOSEC) 40 MG capsule Take 1 capsule (40 mg total) by mouth once daily.    prasugreL (EFFIENT) 10 mg Tab Take 1 tablet (10 mg total) by mouth once daily.    promethazine (PROMETHEGAN) 25 MG suppository Place 1 suppository (25 mg total) rectally every 6 (six) hours as needed for Nausea.    simethicone (MYLICON) 80 MG chewable tablet Take 80 mg by mouth every 8 (eight) hours as needed.    spironolactone (ALDACTONE) 25 MG tablet Take 1 tablet (25 mg total) by mouth once daily.       Review of patient's allergies indicates:   Allergen Reactions    Augmentin [amoxicillin-pot clavulanate] Swelling     Not allergic  to amoxicillin just a derivative of augmentin    Lisinopril Other (See Comments)     Fluid around heart    Losartan Other (See Comments)     High potassium    Shellfish containing products Anaphylaxis     Pt.states she is allergic to SEAFOOD since the age of 13    Levaquin [levofloxacin] Other (See Comments)     Very bad joint pain    Clindamycin Palpitations     Chest pain       Past Medical History:   Diagnosis Date    Acute coronary syndrome     Allergy     Arthritis     Cardiomyopathy     CHF (congestive heart failure)     Coronary artery disease     Coronary artery disease of native artery of native heart with stable angina pectoris 2021: [facility]: Cath: LAD: Proximal stent patent. LCX: Proximal 80%. LCX: Dominant. Moderate disease. LCX: HEVER 2.75 x 18 mm. Distal dissection. HEVER 2.75 x 22 mm.     Diverticulitis     Diverticulosis     Familial hypercholesterolemia 2022    Former smoker 2022    Heart attack     Heart disease     History of myocardial infarction 2022    Hyperlipidemia     Hypertension     Hypertension 2022    ICD (implantable cardioverter-defibrillator) in place     Non-ST elevation myocardial infarction (NSTEMI) 2019     Past Surgical History:   Procedure Laterality Date    ATRIAL CARDIAC PACEMAKER INSERTION       SECTION      CORONARY STENT PLACEMENT      LEFT HEART CATHETERIZATION Left 2022    Procedure: CATHETERIZATION, HEART, LEFT;  Surgeon: Yohannes Cordova MD;  Location: [facility] CATH LAB;  Service: Cardiology;  Laterality: Left;     Family History       Problem Relation (Age of Onset)    Emphysema Father    Heart attack Brother    Heart disease Mother          Tobacco Use    Smoking status: Former Smoker     Packs/day: 0.50     Start date: 1976     Quit date: 2012     Years since quittin.4    Smokeless tobacco: Never Used   Substance and Sexual Activity    Alcohol use: No    Drug use: No    Sexual  activity: Not on file     Review of Systems   Constitutional:  Positive for appetite change and unexpected weight change. Negative for chills and fever.   HENT:  Negative for sore throat and trouble swallowing.    Eyes:  Negative for discharge and redness.   Respiratory:  Negative for chest tightness and shortness of breath.    Cardiovascular:  Negative for chest pain.   Gastrointestinal:  Positive for abdominal pain, diarrhea, nausea and vomiting. Negative for constipation.   Genitourinary:  Negative for dysuria.   Musculoskeletal:  Negative for back pain and neck pain.   Skin:  Negative for color change.   Allergic/Immunologic: Negative for immunocompromised state.   Neurological:  Negative for dizziness and headaches.   Hematological:  Negative for adenopathy.   Objective:     Vital Signs (Most Recent):  Temp: 98 °F (36.7 °C) (05/06/22 2101)  Pulse: 72 (05/06/22 2230)  Resp: 20 (05/06/22 2230)  BP: (!) 112/56 (05/06/22 2230)  SpO2: 95 % (05/06/22 2230)   Vital Signs (24h Range):  Temp:  [96 °F (35.6 °C)-98.6 °F (37 °C)] 98 °F (36.7 °C)  Pulse:  [69-79] 72  Resp:  [17-22] 20  SpO2:  [88 %-100 %] 95 %  BP: (106-119)/(55-63) 112/56     Weight: 60.3 kg (133 lb)  Body mass index is 23.56 kg/m².    Physical Exam  Vitals and nursing note reviewed.   Constitutional:       General: She is not in acute distress.     Appearance: She is well-developed. She is not diaphoretic.   HENT:      Head: Normocephalic and atraumatic.   Eyes:      Pupils: Pupils are equal, round, and reactive to light.   Cardiovascular:      Rate and Rhythm: Normal rate and regular rhythm.   Pulmonary:      Effort: Pulmonary effort is normal. No respiratory distress.   Abdominal:      General: There is no distension.      Palpations: Abdomen is soft.      Tenderness: There is abdominal tenderness. There is no guarding.      Comments: Abd soft moderately tender in LUQ and left flank, non-tender in RUQ to deep palpation, Rasmussen's negative    Musculoskeletal:         General: Normal range of motion.      Cervical back: Normal range of motion and neck supple.   Skin:     General: Skin is warm and dry.   Neurological:      Mental Status: She is alert and oriented to person, place, and time.   Psychiatric:         Behavior: Behavior normal.         Thought Content: Thought content normal.         Judgment: Judgment normal.       Significant Labs:  CBC:   Recent Labs   Lab 05/06/22  2150 05/06/22  2155   WBC 2.00*  --    RBC 5.92*  --    HGB 16.1*  --    HCT 50.6* 46   *  --    MCV 86  --    MCH 27.2  --    MCHC 31.8*  --      BMP:   Recent Labs   Lab 05/06/22  0420   *   *   K 4.9      CO2 23   BUN 22   CREATININE 0.6   CALCIUM 8.3*   MG 1.9       Significant Diagnostics:  I have reviewed all pertinent imaging results/findings within the past 24 hours.    US showed pericholecystic fluid and a thickened GB wall, with gallstones, CBD 1mm      Assessment/Plan:     Left upper quadrant abdominal pain  Patient with LUQ, N/V for past few months who also has a thickened GB with pericholecystic fluid    Her pain, N/V are all likely 2/2 to her complicated diverticulitis with colovesicular fistula as she has had them for past several months since she was diagnosed with this problem  Would recommend HIDA to determine if her cystic duct in patent  If HIDA is positive, would likely recommend percutaneous beatriz tube with IR as she is a high risk surgical candidate  Would recommend reaching out to Colorectal Surgery in the AM to determine if they're still planning on surgery next week    Please call with any questions or concerns      VTE Risk Mitigation (From admission, onward)    None          Thank you for your consult. I will follow-up with patient. Please contact us if you have any additional questions.    Joseph Larson MD  General Surgery  [facility] - Emergency Dept

## 2022-05-07 NOTE — ASSESSMENT & PLAN NOTE
Pseudomonas / Enterobacteroid acute cystitis without hematuria    - Continue Cefepime 2grams iv and Flagyl 500mg iv every 8 hours

## 2022-05-07 NOTE — HPI
Odette Hart is a 66 y.o. 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 Patient was at [facility] and has been complaining of constant LUQ abdominal pain, poor PO intake, and N/V. She has been having abdominal pain, nausea/vomiting, and inability to tolerate PO since she was diagnosed with diverticulitis. They thought her symptoms were due to flagyl and it was discontinued with improvement, however her symptoms returned 10 days ago. She had a CT of her abdomen on 05/04 which showed improving diverticulitis, persistent colovesicular fistula and distended gallbladder with pericholecystic fluid. She denies any fevers, chills, chest pain, dysuria.    General surgery consulted in ED and recommend HIDA.

## 2022-05-07 NOTE — PLAN OF CARE
Problem: Adult Inpatient Plan of Care  Goal: Plan of Care Review  Outcome: Ongoing, Progressing  Goal: Patient-Specific Goal (Individualized)  Outcome: Ongoing, Progressing  Goal: Absence of Hospital-Acquired Illness or Injury  Outcome: Ongoing, Progressing  Intervention: Identify and Manage Fall Risk  Flowsheets (Taken 5/7/2022 0616)  Safety Promotion/Fall Prevention:   assistive device/personal item within reach   Fall Risk reviewed with patient/family   lighting adjusted   medications reviewed   nonskid shoes/socks when out of bed  Intervention: Prevent Skin Injury  Flowsheets (Taken 5/7/2022 0616)  Body Position: position changed independently  Intervention: Prevent and Manage VTE (Venous Thromboembolism) Risk  Flowsheets (Taken 5/7/2022 0616)  Activity Management: Ambulated to bathroom - L4  Goal: Optimal Comfort and Wellbeing  Outcome: Ongoing, Progressing  Intervention: Monitor Pain and Promote Comfort  Flowsheets (Taken 5/7/2022 0616)  Pain Management Interventions: care clustered  Intervention: Provide Person-Centered Care  Flowsheets (Taken 5/7/2022 0616)  Trust Relationship/Rapport:   care explained   thoughts/feelings acknowledged  Goal: Readiness for Transition of Care  Outcome: Ongoing, Progressing     Problem: Infection  Goal: Absence of Infection Signs and Symptoms  Outcome: Ongoing, Progressing     Problem: Fall Injury Risk  Goal: Absence of Fall and Fall-Related Injury  Outcome: Ongoing, Progressing  Intervention: Identify and Manage Contributors  Flowsheets (Taken 5/7/2022 0616)  Medication Review/Management: medications reviewed  Intervention: Promote Injury-Free Environment  Flowsheets (Taken 5/7/2022 0616)  Safety Promotion/Fall Prevention:   assistive device/personal item within reach   Fall Risk reviewed with patient/family   lighting adjusted   medications reviewed   nonskid shoes/socks when out of bed

## 2022-05-07 NOTE — ED NOTES
Adult Physical Assessment  LOC: Odette Hart, 66 y.o. female verified via two identifiers.  The patient is awake, alert, oriented and speaking appropriately at this time.  APPEARANCE: Patient resting comfortably and appears to be in no acute distress at this time. Patient is clean and well groomed, patient's clothing is properly fastened.  SKIN:The skin is warm and dry, color consistent with ethnicity, patient has normal skin turgor and moist mucus membranes, skin intact, no breakdown or brusing noted.  MUSCULOSKELETAL: Patient moving all extremities well, no obvious swelling or deformities noted.  RESPIRATORY: Airway is open and patent, respirations are spontaneous, patient has a normal effort and rate, no accessory muscle use noted.  CARDIAC: Patient has a normal rate and rhythm, no periphreal edema noted in any extremity, capillary refill < 3 seconds in all extremities.  ABDOMEN: Soft and mildly tender to palpation in RUQ, no abdominal distention noted. Bowel sounds present in all four quadrants.  NEUROLOGIC: Eyes open spontaneously, behavior appropriate to situation, follows commands, facial expression symmetrical, bilateral hand grasp equal and even, purposeful motor response noted, normal sensation in all extremities when touched with a finger.

## 2022-05-07 NOTE — ED NOTES
Telemetry Verification   Patient placed on Telemetry Box  Verified with War Room  Box # [id number]   Monitor Tech    Rate 66   Rhythm NSR

## 2022-05-07 NOTE — ED PROVIDER NOTES
Encounter Date: 5/6/2022       History     Chief Complaint   Patient presents with    Abnormal Ct Scan     Had CT earlier showed possible acute cholecystitis. From [facility]     Patient is a 66-year-old PMH of CAD s/p PCI (1/24/22) on dual antiplatelet therapy, CHF (EF 20%), HTN, ICD, and complicated diverticulitis with colovesical fistula scheduled for repair 05/09/2022 presenting today for abnormal ultrasound.  She has been having pain in her upper abdomen for the past 10 days with associated nausea, dry heaves and vomiting.  Pain is intermittent, not necessarily worse after her TPN feeds.  She denies fevers or chills.  She had a CT of her abdomen on 05/04 which showed improving diverticulitis, persistent colovesicular fistula and distended gallbladder with pericholecystic fluid.  She had a follow-up ultrasound yesterday which showed cholelithiasis, gallbladder wall thickening with pericholecystic fluid, negative Rasmussen sign.  She was referred to the ED for further evaluation.          Review of patient's allergies indicates:   Allergen Reactions    Augmentin [amoxicillin-pot clavulanate] Swelling     Not allergic to amoxicillin just a derivative of augmentin    Lisinopril Other (See Comments)     Fluid around heart    Losartan Other (See Comments)     High potassium    Shellfish containing products Anaphylaxis     Pt.states she is allergic to SEAFOOD since the age of 13    Levaquin [levofloxacin] Other (See Comments)     Very bad joint pain    Clindamycin Palpitations     Chest pain     Past Medical History:   Diagnosis Date    Acute coronary syndrome     Allergy     Arthritis     Cardiomyopathy     CHF (congestive heart failure)     Coronary artery disease     Coronary artery disease of native artery of native heart with stable angina pectoris 4/19/2021 1/24/2022: [facility]: Cath: LAD: Proximal stent patent. LCX: Proximal 80%. LCX: Dominant. Moderate disease. LCX: HEVER 2.75 x 18 mm.  Distal dissection. HEVER 2.75 x 22 mm.     Diverticulitis     Diverticulosis     Familial hypercholesterolemia 2022    Former smoker 2022    Heart attack     Heart disease     History of myocardial infarction 2022    Hyperlipidemia     Hypertension     Hypertension 2022    ICD (implantable cardioverter-defibrillator) in place     Non-ST elevation myocardial infarction (NSTEMI) 2019     Past Surgical History:   Procedure Laterality Date    ATRIAL CARDIAC PACEMAKER INSERTION       SECTION      CORONARY STENT PLACEMENT      LEFT HEART CATHETERIZATION Left 2022    Procedure: CATHETERIZATION, HEART, LEFT;  Surgeon: Yohannes Cordova MD;  Location[facility] CATH LAB;  Service: Cardiology;  Laterality: Left;     Family History   Problem Relation Age of Onset    Heart disease Mother     Emphysema Father     Heart attack Brother      Social History     Tobacco Use    Smoking status: Former Smoker     Packs/day: 0.50     Start date: 1976     Quit date: 2012     Years since quittin.4    Smokeless tobacco: Never Used   Substance Use Topics    Alcohol use: No    Drug use: No     Review of Systems   Constitutional: Negative for chills, diaphoresis and fever.   HENT: Negative for congestion and sore throat.    Eyes: Negative for visual disturbance.   Respiratory: Negative for cough, chest tightness and shortness of breath.    Cardiovascular: Negative for chest pain and leg swelling.   Gastrointestinal: Positive for abdominal distention, abdominal pain, nausea and vomiting. Negative for constipation and diarrhea.   Genitourinary: Negative for dysuria, hematuria, urgency and vaginal discharge.   Musculoskeletal: Negative for arthralgias, back pain and neck stiffness.   Skin: Negative for color change and rash.   Neurological: Negative for light-headedness.       Physical Exam     Initial Vitals [22]   BP Pulse Resp Temp SpO2   118/63 75 17 97.3 °F (36.3 °C)  96 %      MAP       --         Physical Exam    Nursing note and vitals reviewed.  Constitutional: She appears well-developed and well-nourished. No distress.   HENT:   Mouth/Throat: Oropharynx is clear and moist.   Eyes: Conjunctivae are normal. No scleral icterus.   Neck: No JVD present.   Cardiovascular: Normal rate, regular rhythm and intact distal pulses.   Pulmonary/Chest: Breath sounds normal. No respiratory distress. She has no wheezes. She has no rales.   Abdominal: Abdomen is soft. Bowel sounds are normal. She exhibits no distension. There is abdominal tenderness (Right upper quadrant tenderness,). There is no rebound and no guarding.   Musculoskeletal:         General: No edema.     Lymphadenopathy:     She has no cervical adenopathy.   Neurological: She is alert and oriented to person, place, and time.   Skin: Skin is warm. No rash noted.         ED Course   Procedures  Labs Reviewed   CBC W/ AUTO DIFFERENTIAL - Abnormal; Notable for the following components:       Result Value    WBC 2.00 (*)     RBC 5.92 (*)     Hemoglobin 16.1 (*)     Hematocrit 50.6 (*)     MCHC 31.8 (*)     RDW 24.1 (*)     Platelets 139 (*)     Gran # (ANC) 0.3 (*)     nRBC 2 (*)     Gran % 16.0 (*)     Lymph % 64.0 (*)     Mono % 18.5 (*)     Platelet Estimate Clumped (*)     All other components within normal limits   COMPREHENSIVE METABOLIC PANEL - Abnormal; Notable for the following components:    Sodium 135 (*)     Calcium 8.3 (*)     Albumin 2.2 (*)     Total Bilirubin 1.2 (*)     AST 46 (*)     All other components within normal limits   URINALYSIS, REFLEX TO URINE CULTURE - Abnormal; Notable for the following components:    Protein, UA 1+ (*)     Occult Blood UA 1+ (*)     Leukocytes, UA 2+ (*)     All other components within normal limits    Narrative:     Specimen Source->Urine   URINALYSIS MICROSCOPIC - Abnormal; Notable for the following components:    RBC, UA 5 (*)     WBC, UA 48 (*)     All other components within  normal limits    Narrative:     Specimen Source->Urine   CULTURE, URINE   LIPASE   SARS-COV-2 RDRP GENE   ISTAT PROCEDURE   ISTAT CHEM8          Imaging Results    None          Medications   ondansetron injection 4 mg (4 mg Intravenous Given 5/6/22 2146)     Medical Decision Making:   History:   Old Medical Records: I decided to obtain old medical records.  Old Records Summarized: records from previous admission(s).       <> Summary of Records: Imaging reviewed from yesterday, reported in HPI  Initial Assessment:   Emergent evaluation a 66-year-old female presenting today with abnormal ultrasound for equivocal cholecystitis.    Vital signs stable  Differential Diagnosis:   Acute cholecystitis, hypervolemia, cholelithiasis, gastritis, peptic ulcer disease  ED Management:  - d/w general surgery who recommends HIDA scan.  - d/w radiology, HIDA will be done tomorrow.  - per surgery, if HIDA is positive, will recommend perchole tube as pt is high risk for surgery  - recommend obs to  for further workup.                ED Course as of 05/06/22 2327   Fri May 06, 2022   2243 WBC, UA(!): 48 [GM]   2243 RBC, UA(!): 5 [GM]   2243 WBC(!): 2.00 [GM]      ED Course User Index  [GM] Josee Hammond MD             Clinical Impression:   Final diagnoses:  [K80.10] Calculus of gallbladder with cholecystitis without biliary obstruction, unspecified cholecystitis acuity (Primary)          ED Disposition Condition    Observation               Josee Hammond MD  05/06/22 2327

## 2022-05-07 NOTE — ANESTHESIA PREPROCEDURE EVALUATION
[facility]  Anesthesia Pre-Operative Evaluation         Patient Name: Odette Hart  YOB: 1955  MRN: [id number]    SUBJECTIVE:     Pre-operative evaluation for Procedure(s) (LRB):  COLECTOMY, SIGMOID (ERAS High, lithotomy) (N/A)  SIGMOIDOSCOPY, FLEXIBLE (N/A)  CYSTOSCOPY, WITH URETERAL STENT INSERTION (Bilateral)     05/07/2022    Odette Hart is a 66 y.o. female w/ a significant PMHx of HFrEF EF 20% with ICD, CAD s/p 2 HEVER 01/22 on DAPT, colovesicular fistula 2/2 to complicated diverticulitis. She was at [facility] and now hospitalized with concerns for cholecystitis.     The above procedure was scheduled prior to most recent ED visit.      Diverticulitis Diverticulosis   ICD (implantable cardioverter-defibrillator) in place Hypertension   Heart disease Hyperlipidemia   Arthritis Allergy   Coronary artery disease Acute coronary syndrome   CHF (congestive heart failure) Cardiomyopathy   History of myocardial infarction Hypertension   Former smoker Familial hypercholesterolemia   Coronary artery disease of native artery of native heart with stable angina pectoris              Patient now presents for the above procedure(s).    LDA:   PICC Double Lumen 04/21/22 1424 right basilic (Active)   Site Assessment No drainage;No redness;No swelling;No warmth 05/07/22 0200   Extremity Assessment Distal to IV No warmth;No swelling;No redness;No abnormal discoloration 05/07/22 0200   Line Securement Device Secured with sutureless device 05/07/22 0200   Dressing Type Biopatch in place 05/07/22 0200   Dressing Status Clean;Dry;Intact 05/07/22 0200   Dressing Intervention Integrity maintained 05/07/22 0200   Date on Dressing 04/29/22 05/05/22 0740   Dressing Due to be Changed 05/06/22 05/05/22 0740   Left Lumen Patency/Care Infusing 05/05/22 0740   Right Lumen Patency/Care Blood return present;Flushed w/o difficulty;Normal saline locked 05/04/22 0701   Current Insertion Depth (cm) 31 cm  04/21/22 1420   Current Exposed Catheter (cm) 0 cm 04/21/22 1420   Extremity Circumference (cm) 26 cm 04/21/22 1420   Waveform Normal 04/21/22 1420   Line Necessity Review Longterm central access required 05/05/22 0740   Number of days: 15            Peripheral IV - Single Lumen 05/04/22 2058 20 G Left Forearm (Active)   Site Assessment Clean;Dry;Intact 05/06/22 2058   Extremity Assessment Distal to IV No redness;No swelling;No warmth;No abnormal discoloration 05/06/22 2058   Line Status Blood return noted 05/06/22 2058   Dressing Status Clean;Dry;Intact 05/06/22 2058   Number of days: 2       Prev airway: None documented.    Drips: None documented.      Patient Active Problem List   Diagnosis    Ischemic cardiomyopathy    Automatic implantable cardioverter-defibrillator in situ    History of coronary artery stent placement    Elevated rheumatoid factor    RBBB with left anterior fascicular block    Acute diverticulitis    Coronary artery disease of native artery of native heart with stable angina pectoris    Familial hypercholesterolemia    Normocytic anemia    History of myocardial infarction    Primary hypertension    Former smoker    Chronic combined systolic and diastolic congestive heart failure    Colovesical fistula    Urinary tract infection with pyuria    Leukopenia    Severe malnutrition    At risk for surgical complication    Acute cystitis without hematuria    Left upper quadrant abdominal pain    Acute cholecystitis    Calculus of gallbladder with cholecystitis without biliary obstruction       Review of patient's allergies indicates:   Allergen Reactions    Augmentin [amoxicillin-pot clavulanate] Swelling     Not allergic to amoxicillin just a derivative of augmentin    Lisinopril Other (See Comments)     Fluid around heart    Losartan Other (See Comments)     High potassium    Shellfish containing products Anaphylaxis     Pt.states she is allergic to SEAFOOD since the age of  13    Levaquin [levofloxacin] Other (See Comments)     Very bad joint pain    Clindamycin Palpitations     Chest pain       Current Inpatient Medications:      Current Facility-Administered Medications on File Prior to Encounter   Medication Dose Route Frequency Provider Last Rate Last Admin    [MAR Hold - Suspended Admission] AA 5%-D15W-electrolytes 1,000 mL  1,000 mL Intravenous Continuous Chelsea FLuciano Dee NP        [MAR Hold - Suspended Admission] AA 5%-D15W-electrolytes 1,000 mL  1,000 mL Intravenous Continuous Chelsea F. WINNIE Dee        [MAR Hold - Suspended Admission] AA 5%-D15W-electrolytes 1,000 mL  1,000 mL Intravenous Continuous Chelsea F. WINNIE Dee        [MAR Hold - Suspended Admission] acetaminophen tablet 650 mg  650 mg Oral Q4H PRN Carola Buckley NP   650 mg at 04/29/22 2337    [MAR Hold - Suspended Admission] albuterol nebulizer solution 2.5 mg  2.5 mg Nebulization Q6H PRN Carola Buckley NP   2.5 mg at 05/03/22 1950    [MAR Hold - Suspended Admission] albuterol-ipratropium 2.5 mg-0.5 mg/3 mL nebulizer solution 3 mL  3 mL Nebulization Q6H Chelsea Dee NP   3 mL at 05/06/22 1328    [MAR Hold - Suspended Admission] ALPRAZolam tablet 0.5 mg  0.5 mg Oral Nightly PRN Carola Buckley NP   0.5 mg at 05/06/22 0111    [MAR Hold - Suspended Admission] aspirin EC tablet 81 mg  81 mg Oral Daily Carola Buckley NP   81 mg at 05/06/22 0838    [MAR Hold - Suspended Admission] carvediloL tablet 12.5 mg  12.5 mg Oral BID  Messi Bolaños MD   12.5 mg at 05/06/22 0838    [MAR Hold - Suspended Admission] cefepime in dextrose 5 % IVPB 2 g  2 g Intravenous Q8H Chelsea Dee NP   Stopped at 05/06/22 1432    [MAR Hold - Suspended Admission] dextrose 10% bolus 125 mL  12.5 g Intravenous PRN Isatu Brown NP        [MAR Hold - Suspended Admission] dextrose 10% bolus 250 mL  25 g Intravenous PRN Isatu Brown NP        [MAR Hold - Suspended Admission] dextrose 50%  injection 12.5 g  12.5 g Intravenous PRN Isatu Brown NP        [MAR Hold - Suspended Admission] dicyclomine capsule 10 mg  10 mg Oral TID Chelsea Dee NP   10 mg at 05/05/22 1504    [MAR Hold - Suspended Admission] fat emulsion 20% infusion 250 mL  250 mL Intravenous Every Mon, Wed, Fri Chelsea Dee NP        [MAR Hold - Suspended Admission] fat emulsion 20% infusion 250 mL  250 mL Intravenous Every Mon, Wed, Fri Chelsea Dee NP        [MAR Hold - Suspended Admission] furosemide injection 40 mg  40 mg Intravenous Daily Chelsea Dee NP   40 mg at 05/06/22 1141    [MAR Hold - Suspended Admission] glucagon (human recombinant) injection 1 mg  1 mg Intramuscular PRN Isatu Brown NP        [MAR Hold - Suspended Admission] hydrocortisone 2.5 % rectal cream   Rectal BID Chelsea Dee NP   Given at 05/06/22 0838    [MAR Hold - Suspended Admission] insulin aspart U-100 pen 1-10 Units  1-10 Units Subcutaneous Q6H PRN Isatu Brown NP        [MAR Hold - Suspended Admission] melatonin tablet 6 mg  6 mg Oral Nightly PRN Carola Buckley NP        [MAR Hold - Suspended Admission] ondansetron injection 4 mg  4 mg Intravenous QID Chelsea Dee NP   4 mg at 05/06/22 1706    [MAR Hold - Suspended Admission] pantoprazole EC tablet 40 mg  40 mg Oral Daily Carola Buckley NP   40 mg at 05/06/22 0840    [MAR Hold - Suspended Admission] promethazine tablet 25 mg  25 mg Oral Q8H PRN Isatu Brown NP   25 mg at 05/03/22 0157    [MAR Hold - Suspended Admission] scopolamine 1.3-1.5 mg (1 mg over 3 days) 1 patch  1 patch Transdermal Q3 Days Chelsea Dee NP        [MAR Hold - Suspended Admission] simethicone chewable tablet 80 mg  1 tablet Oral TID Chelsea Dee NP   80 mg at 05/05/22 2100    [MAR Hold - Suspended Admission] sodium chloride 0.9% flush 10 mL  10 mL Intravenous PRN Carola Buckley NP        [MAR Hold - Suspended Admission] sodium chloride 0.9% flush 10 mL   10 mL Intravenous Q6H Carola Buckley NP   10 mL at 05/06/22 1707    And    [MAR Hold - Suspended Admission] sodium chloride 0.9% flush 10 mL  10 mL Intravenous PRN Carola Buckley NP   10 mL at 04/29/22 2223    [MAR Hold - Suspended Admission] traMADoL tablet 50 mg  50 mg Oral Q6H PRN Carola Buckley NP   50 mg at 05/01/22 1408     Current Outpatient Medications on File Prior to Encounter   Medication Sig Dispense Refill    acetaminophen (TYLENOL) 500 MG tablet Take 2 tablets (1,000 mg total) by mouth every 8 (eight) hours as needed for Pain.  0    albuterol (PROVENTIL/VENTOLIN HFA) 90 mcg/actuation inhaler Inhale 2 puffs into the lungs every 6 (six) hours as needed for Wheezing. Rescue 18 g 1    ALPRAZolam (XANAX) 0.5 MG tablet Take 1 tablet (0.5 mg total) by mouth nightly as needed for Anxiety. 30 tablet 2    aspirin (ECOTRIN) 81 MG EC tablet Take 1 tablet (81 mg total) by mouth once daily. 90 tablet 0    carvediloL (COREG) 12.5 MG tablet Take 1 tablet (12.5 mg total) by mouth 2 (two) times daily with meals. 180 tablet 3    dicyclomine (BENTYL) 10 MG capsule Take 1 capsule (10 mg total) by mouth 4 (four) times daily as needed. 120 capsule 3    ezetimibe (ZETIA) 10 mg tablet Take 1 tablet (10 mg total) by mouth every evening. 90 tablet 3    fluticasone propionate (FLONASE) 50 mcg/actuation nasal spray 1 spray (50 mcg total) by Each Nostril route once daily. 9.9 mL 1    Lactobacillus rhamnosus GG (CULTURELLE) 10 billion cell capsule Take 1 capsule by mouth once daily.      metronidazole (FLAGYL) 50 mg/mL Syrg Take 10 mLs (500 mg total) by mouth every 8 (eight) hours. 900 mL 0    nitroGLYCERIN (NITROSTAT) 0.4 MG SL tablet Place 1 tablet (0.4 mg total) under the tongue every 5 (five) minutes as needed for Chest pain. (Patient not taking: Reported on 4/20/2022) 25 tablet 0    omeprazole (PRILOSEC) 40 MG capsule Take 1 capsule (40 mg total) by mouth once daily. 90 capsule 2    prasugreL  (EFFIENT) 10 mg Tab Take 1 tablet (10 mg total) by mouth once daily. 90 tablet 0    promethazine (PROMETHEGAN) 25 MG suppository Place 1 suppository (25 mg total) rectally every 6 (six) hours as needed for Nausea. 12 suppository 2    simethicone (MYLICON) 80 MG chewable tablet Take 80 mg by mouth every 8 (eight) hours as needed.      spironolactone (ALDACTONE) 25 MG tablet Take 1 tablet (25 mg total) by mouth once daily. 90 tablet 3       Past Surgical History:   Procedure Laterality Date    ATRIAL CARDIAC PACEMAKER INSERTION       SECTION      CORONARY STENT PLACEMENT      LEFT HEART CATHETERIZATION Left 2022    Procedure: CATHETERIZATION, HEART, LEFT;  Surgeon: Yohannes Cordova MD;  Location: [facility] CATH LAB;  Service: Cardiology;  Laterality: Left;       Social History     Socioeconomic History    Marital status:    Tobacco Use    Smoking status: Former Smoker     Packs/day: 0.50     Start date: 1976     Quit date: 2012     Years since quittin.4    Smokeless tobacco: Never Used   Substance and Sexual Activity    Alcohol use: No    Drug use: No       OBJECTIVE:     Vital Signs Range (Last 24H):  Temp:  [35.6 °C (96 °F)-37 °C (98.6 °F)]   Pulse:  [64-79]   Resp:  [17-22]   BP: (106-121)/(55-63)   SpO2:  [88 %-100 %]       Significant Labs:  Lab Results   Component Value Date    WBC 2.00 (L) 2022    HGB 16.1 (H) 2022    HCT 46 2022     (L) 2022    CHOL 150 03/15/2022    TRIG 97 2022    HDL 29 (L) 03/15/2022    ALT 25 2022    AST 46 (H) 2022     (L) 2022    K 3.8 2022    CL 99 2022    CREATININE 0.6 2022    BUN 20 2022    CO2 27 2022    TSH 1.553 03/15/2022    INR 1.1 2022       Diagnostic Studies: No relevant studies.    EKG:   Results for orders placed or performed in visit on 22   IN OFFICE EKG 12-LEAD (to [place])    Collection Time: 22  2:34 PM    Narrative    Test  Reason : Z95.810,I50.42,I25.118,E78.01,Z95.5,I25.2,I25.5,I10,I45.2,    Vent. Rate : 083 BPM     Atrial Rate : 083 BPM     P-R Int : 124 ms          QRS Dur : 134 ms      QT Int : 374 ms       P-R-T Axes : 056 -63 089 degrees     QTc Int : 439 ms    Normal sinus rhythm  Right bundle branch block  Left anterior fascicular block   Bifascicular block   Anterolateral infarct (cited on or before 16-JAN-2019)  Abnormal ECG  When compared with ECG of 25-FEB-2022 16:48,  Premature atrial complexes are no longer Present  Nonspecific T wave abnormality no longer evident in Inferior leads  T wave inversion less evident in Lateral leads  Confirmed by Freddy Arenas MD ([id number]) on 4/5/2022 5:51:02 PM    Referred By:  dory           Confirmed By:Freddy Arenas MD       ECHOCARDIOGRAM:  TTE:  Results for orders placed or performed during the hospital encounter of 01/22/22   Echo Saline Bubble? No   Result Value Ref Range    BSA 1.63 m2    TDI SEPTAL 0.04 m/s    LV LATERAL E/E' RATIO 26.25 m/s    LV SEPTAL E/E' RATIO 26.25 m/s    LA WIDTH 4.89 cm    TDI LATERAL 0.04 m/s    PV PEAK VELOCITY 0.57 cm/s    LVIDd 6.82 (A) 3.5 - 6.0 cm    IVS 0.69 0.6 - 1.1 cm    Posterior Wall 0.69 0.6 - 1.1 cm    LVIDs 6.51 (A) 2.1 - 4.0 cm    FS 5 28 - 44 %    LA volume 113.62 cm3    Sinus 2.98 cm    STJ 2.06 cm    LV mass 195.42 g    LA size 4.50 cm    TAPSE 1.38 cm    Left Ventricle Relative Wall Thickness 0.20 cm    AV mean gradient 6 mmHg    AV valve area 3.03 cm2    AV Velocity Ratio 0.89     AV index (prosthetic) 0.84     MV valve area p 1/2 method 4.63 cm2    E/A ratio 2.23     Mean e' 0.04 m/s    E wave deceleration time 163.88 msec    IVRT 83.73 msec    Pulm vein S/D ratio 3.15     LVOT diameter 2.14 cm    LVOT area 3.6 cm2    LVOT peak spencer 1.42 m/s    LVOT peak VTI 23.22 cm    Ao peak spencer 1.59 m/s    Ao VTI 27.57 cm    Mr max spencer 0.04 m/s    LVOT stroke volume 83.48 cm3    AV peak gradient 10 mmHg    E/E' ratio 26.25 m/s    MV Peak E Spencer  1.05 m/s    TR Max Thad 2.23 m/s    MV stenosis pressure 1/2 time 47.53 ms    MV Peak A Thad 0.47 m/s    PV Peak S Thad 0.63 m/s    PV Peak D Thad 0.20 m/s    LV Systolic Volume 216.49 mL    LV Systolic Volume Index 133.6 mL/m2    LV Diastolic Volume 240.91 mL    LV Diastolic Volume Index 148.71 mL/m2    LA Volume Index 70.1 mL/m2    LV Mass Index 121 g/m2    RA Major Axis 4.44 cm    Left Atrium Minor Axis 6.02 cm    Left Atrium Major Axis 6.13 cm    Triscuspid Valve Regurgitation Peak Gradient 20 mmHg    LA Volume Index (Mod) 59.9 mL/m2    LA volume (mod) 97.00 cm3    RA Width 3.22 cm    Right Atrial Pressure (from IVC) 8 mmHg    EF 20 %    TV rest pulmonary artery pressure 28 mmHg    Narrative    · The left ventricle is severely enlarged with mild eccentric hypertrophy   and severely decreased systolic function.  · There are segmental left ventricular wall motion abnormalities. The mid   anteroseptal and anterior walls are akinetic. The apex is akinetic. The   remainder of the left ventricular walls are severely hypokinetic.  · The estimated ejection fraction is 20%.  · Spontaneous echocardiographic contrast in the left ventricle.  · Grade III left ventricular diastolic dysfunction.  · Severe left atrial enlargement.  · Normal right ventricular size with normal right ventricular systolic   function.  · Mild right atrial enlargement.  · Mild aortic regurgitation.  · Mild-to-moderate mitral regurgitation.  · Mild tricuspid regurgitation.  · The estimated PA systolic pressure is 28 mmHg.  · Intermediate central venous pressure (8 mmHg).  · Pacemaker/ICD lead.            ASSESSMENT/PLAN:           Pre-op Assessment    I have reviewed the Patient Summary Reports.     I have reviewed the Nursing Notes. I have reviewed the NPO Status.   I have reviewed the Medications.     Review of Systems  Anesthesia Hx:  No problems with previous Anesthesia  History of prior surgery of interest to airway management or planning:  Denies  Personal Hx of Anesthesia complications.   Cardiovascular:   Hypertension Past MI CAD  Dysrhythmias  Angina CHF        Physical Exam  General: Well nourished    Airway:  Mallampati: III   Mouth Opening: Normal  TM Distance: Normal  Tongue: Normal  Neck ROM: Normal ROM    Dental:  Intact    Chest/Lungs:  Clear to auscultation, Normal Respiratory Rate    Heart:  Rate: Normal  Rhythm: Regular Rhythm  Sounds: Normal        Anesthesia Plan  Type of Anesthesia, risks & benefits discussed:    Anesthesia Type: Gen ETT  Intra-op Monitoring Plan: Standard ASA Monitors, Art Line and Central Line  Post Op Pain Control Plan: multimodal analgesia and IV/PO Opioids PRN  Induction:  IV  Airway Plan: Direct  Informed Consent: Informed consent signed with the Patient and all parties understand the risks and agree with anesthesia plan.  All questions answered.   ASA Score: 4  Day of Surgery Review of History & Physical: H&P Update referred to the surgeon/provider.    Ready For Surgery From Anesthesia Perspective.     .

## 2022-05-08 LAB
ALBUMIN SERPL BCP-MCNC: 2.1 G/DL (ref 3.5–5.2)
ALP SERPL-CCNC: 87 U/L (ref 55–135)
ALT SERPL W/O P-5'-P-CCNC: 29 U/L (ref 10–44)
ANION GAP SERPL CALC-SCNC: 9 MMOL/L (ref 8–16)
ANISOCYTOSIS BLD QL SMEAR: ABNORMAL
AST SERPL-CCNC: 46 U/L (ref 10–40)
BASOPHILS # BLD AUTO: 0.03 K/UL (ref 0–0.2)
BASOPHILS NFR BLD: 0.8 % (ref 0–1.9)
BILIRUB SERPL-MCNC: 1.2 MG/DL (ref 0.1–1)
BUN SERPL-MCNC: 20 MG/DL (ref 8–23)
CALCIUM SERPL-MCNC: 8.4 MG/DL (ref 8.7–10.5)
CHLORIDE SERPL-SCNC: 94 MMOL/L (ref 95–110)
CO2 SERPL-SCNC: 30 MMOL/L (ref 23–29)
CREAT SERPL-MCNC: 0.7 MG/DL (ref 0.5–1.4)
DIFFERENTIAL METHOD: ABNORMAL
EOSINOPHIL # BLD AUTO: 0.1 K/UL (ref 0–0.5)
EOSINOPHIL NFR BLD: 1.3 % (ref 0–8)
ERYTHROCYTE [DISTWIDTH] IN BLOOD BY AUTOMATED COUNT: 23.4 % (ref 11.5–14.5)
EST. GFR  (AFRICAN AMERICAN): >60 ML/MIN/1.73 M^2
EST. GFR  (NON AFRICAN AMERICAN): >60 ML/MIN/1.73 M^2
GLUCOSE SERPL-MCNC: 99 MG/DL (ref 70–110)
HCT VFR BLD AUTO: 35.3 % (ref 37–48.5)
HGB BLD-MCNC: 11.4 G/DL (ref 12–16)
HYPOCHROMIA BLD QL SMEAR: ABNORMAL
IMM GRANULOCYTES # BLD AUTO: 0.01 K/UL (ref 0–0.04)
IMM GRANULOCYTES NFR BLD AUTO: 0.3 % (ref 0–0.5)
LYMPHOCYTES # BLD AUTO: 2.6 K/UL (ref 1–4.8)
LYMPHOCYTES NFR BLD: 69.4 % (ref 18–48)
MAGNESIUM SERPL-MCNC: 1.6 MG/DL (ref 1.6–2.6)
MCH RBC QN AUTO: 28.4 PG (ref 27–31)
MCHC RBC AUTO-ENTMCNC: 32.3 G/DL (ref 32–36)
MCV RBC AUTO: 88 FL (ref 82–98)
MONOCYTES # BLD AUTO: 0.7 K/UL (ref 0.3–1)
MONOCYTES NFR BLD: 17.4 % (ref 4–15)
NEUTROPHILS # BLD AUTO: 0.4 K/UL (ref 1.8–7.7)
NEUTROPHILS NFR BLD: 10.8 % (ref 38–73)
NRBC BLD-RTO: 1 /100 WBC
PHOSPHATE SERPL-MCNC: 3.2 MG/DL (ref 2.7–4.5)
PLATELET # BLD AUTO: 247 K/UL (ref 150–450)
PLATELET BLD QL SMEAR: ABNORMAL
PMV BLD AUTO: 11 FL (ref 9.2–12.9)
POLYCHROMASIA BLD QL SMEAR: ABNORMAL
POTASSIUM SERPL-SCNC: 3.7 MMOL/L (ref 3.5–5.1)
PROT SERPL-MCNC: 7 G/DL (ref 6–8.4)
RBC # BLD AUTO: 4.01 M/UL (ref 4–5.4)
SODIUM SERPL-SCNC: 133 MMOL/L (ref 136–145)
WBC # BLD AUTO: 3.73 K/UL (ref 3.9–12.7)

## 2022-05-08 PROCEDURE — 99226 PR SUBSEQUENT OBSERVATION CARE,LEVEL III: CPT | Mod: ,,, | Performed by: HOSPITALIST

## 2022-05-08 PROCEDURE — 84100 ASSAY OF PHOSPHORUS: CPT | Performed by: PHYSICIAN ASSISTANT

## 2022-05-08 PROCEDURE — 99226 PR SUBSEQUENT OBSERVATION CARE,LEVEL III: ICD-10-PCS | Mod: ,,, | Performed by: HOSPITALIST

## 2022-05-08 PROCEDURE — 80053 COMPREHEN METABOLIC PANEL: CPT | Performed by: PHYSICIAN ASSISTANT

## 2022-05-08 PROCEDURE — 25000003 PHARM REV CODE 250: Performed by: STUDENT IN AN ORGANIZED HEALTH CARE EDUCATION/TRAINING PROGRAM

## 2022-05-08 PROCEDURE — 27000221 HC OXYGEN, UP TO 24 HOURS

## 2022-05-08 PROCEDURE — 63600175 PHARM REV CODE 636 W HCPCS: Performed by: PHYSICIAN ASSISTANT

## 2022-05-08 PROCEDURE — G0378 HOSPITAL OBSERVATION PER HR: HCPCS

## 2022-05-08 PROCEDURE — S0030 INJECTION, METRONIDAZOLE: HCPCS | Performed by: STUDENT IN AN ORGANIZED HEALTH CARE EDUCATION/TRAINING PROGRAM

## 2022-05-08 PROCEDURE — S0030 INJECTION, METRONIDAZOLE: HCPCS | Performed by: HOSPITALIST

## 2022-05-08 PROCEDURE — 25000003 PHARM REV CODE 250: Performed by: HOSPITALIST

## 2022-05-08 PROCEDURE — 85025 COMPLETE CBC W/AUTO DIFF WBC: CPT | Performed by: PHYSICIAN ASSISTANT

## 2022-05-08 PROCEDURE — 25000003 PHARM REV CODE 250

## 2022-05-08 PROCEDURE — 25000003 PHARM REV CODE 250: Performed by: PHYSICIAN ASSISTANT

## 2022-05-08 PROCEDURE — 94761 N-INVAS EAR/PLS OXIMETRY MLT: CPT

## 2022-05-08 PROCEDURE — 83735 ASSAY OF MAGNESIUM: CPT | Performed by: PHYSICIAN ASSISTANT

## 2022-05-08 PROCEDURE — 36415 COLL VENOUS BLD VENIPUNCTURE: CPT | Performed by: PHYSICIAN ASSISTANT

## 2022-05-08 RX ORDER — METRONIDAZOLE 500 MG/1
500 TABLET ORAL EVERY 8 HOURS
Status: DISCONTINUED | OUTPATIENT
Start: 2022-05-09 | End: 2022-05-10

## 2022-05-08 RX ORDER — NEOMYCIN SULFATE 500 MG/1
500 TABLET ORAL ONCE
Status: COMPLETED | OUTPATIENT
Start: 2022-05-08 | End: 2022-05-08

## 2022-05-08 RX ORDER — METRONIDAZOLE 500 MG/1
500 TABLET ORAL ONCE
Status: DISCONTINUED | OUTPATIENT
Start: 2022-05-08 | End: 2022-05-08

## 2022-05-08 RX ORDER — EZETIMIBE 10 MG/1
10 TABLET ORAL NIGHTLY
Status: DISCONTINUED | OUTPATIENT
Start: 2022-05-08 | End: 2022-01-01 | Stop reason: HOSPADM

## 2022-05-08 RX ADMIN — NEOMYCIN SULFATE 500 MG: 500 TABLET ORAL at 01:05

## 2022-05-08 RX ADMIN — CARVEDILOL 12.5 MG: 12.5 TABLET, FILM COATED ORAL at 07:05

## 2022-05-08 RX ADMIN — METRONIDAZOLE 500 MG: 500 SOLUTION INTRAVENOUS at 11:05

## 2022-05-08 RX ADMIN — SIMETHICONE 80 MG: 80 TABLET, CHEWABLE ORAL at 08:05

## 2022-05-08 RX ADMIN — NEOMYCIN SULFATE 500 MG: 500 TABLET ORAL at 03:05

## 2022-05-08 RX ADMIN — ONDANSETRON 4 MG: 2 INJECTION INTRAMUSCULAR; INTRAVENOUS at 01:05

## 2022-05-08 RX ADMIN — ONDANSETRON 8 MG: 8 TABLET, ORALLY DISINTEGRATING ORAL at 04:05

## 2022-05-08 RX ADMIN — ONDANSETRON 4 MG: 2 INJECTION INTRAMUSCULAR; INTRAVENOUS at 05:05

## 2022-05-08 RX ADMIN — METRONIDAZOLE 500 MG: 500 SOLUTION INTRAVENOUS at 04:05

## 2022-05-08 RX ADMIN — CEFEPIME HYDROCHLORIDE 2 G: 2 INJECTION, SOLUTION INTRAVENOUS at 01:05

## 2022-05-08 RX ADMIN — HYDROCORTISONE: 25 CREAM TOPICAL at 09:05

## 2022-05-08 RX ADMIN — FUROSEMIDE 40 MG: 40 TABLET ORAL at 09:05

## 2022-05-08 RX ADMIN — METRONIDAZOLE 500 MG: 500 SOLUTION INTRAVENOUS at 08:05

## 2022-05-08 RX ADMIN — ASPIRIN 81 MG: 81 TABLET, COATED ORAL at 09:05

## 2022-05-08 RX ADMIN — ONDANSETRON 4 MG: 2 INJECTION INTRAMUSCULAR; INTRAVENOUS at 09:05

## 2022-05-08 RX ADMIN — NEOMYCIN SULFATE 500 MG: 500 TABLET ORAL at 10:05

## 2022-05-08 RX ADMIN — CEFEPIME HYDROCHLORIDE 2 G: 2 INJECTION, SOLUTION INTRAVENOUS at 06:05

## 2022-05-08 RX ADMIN — CEFEPIME HYDROCHLORIDE 2 G: 2 INJECTION, SOLUTION INTRAVENOUS at 10:05

## 2022-05-08 RX ADMIN — PANTOPRAZOLE SODIUM 40 MG: 40 TABLET, DELAYED RELEASE ORAL at 09:05

## 2022-05-08 NOTE — ASSESSMENT & PLAN NOTE
· Hypoxic requiring 2L on 5/7  · CXR with edema, but on room air now  · Continue Coreg 12.5mg BID  · Continue Lasix 40mg PO daily  · Strict I&Os, daily weights

## 2022-05-08 NOTE — CONSULTS
[facility] - [facility]  Adult Nutrition  Consult Note    SUMMARY     Recommendations    1. Recommend custom TPN of 215g Dextrose, 65g AA, and 250ml IV lipids to provide 1491 kcal, 65g protein, and GIR= 2.49.     2. As medically able, ADAT to low fiber/residue + Boost Plus TID. Texture per SLP.     3. RD following.     Goals: Pt to meet >75% EEN/EPN by RD follow up  Nutrition Goal Status: new  Communication of RD Recs:  (POC)    Assessment and Plan    Severe malnutrition  Nutrition Problem:  Moderate/Severe Protein-Calorie Malnutrition  Malnutrition in the context of Chronic Illness/Injury    Related to (etiology):  Inadequate energy intake, altered GI function    Signs and Symptoms (as evidenced by):  Energy Intake: <75% of estimated energy requirement for >/=1 month   Body Fat Depletion: mild depletion of orbitals and triceps   Muscle Mass Depletion: mild and moderate depletion of clavicle region, interosseous muscle and lower extremities     Interventions(treatment strategy):  Collaboration of nutrition care with other providers  TPN    Nutrition Diagnosis Status:  Improving           Malnutrition Assessment  Malnutrition Type: chronic illness  Energy Intake: moderate energy intake          Energy Intake (Malnutrition): less than 75% for greater than or equal to 1 month   Orbital Region (Subcutaneous Fat Loss): mild depletion  Upper Arm Region (Subcutaneous Fat Loss): mild depletion   Clavicle Bone Region (Muscle Loss): mild depletion  Dorsal Hand (Muscle Loss): moderate depletion  Anterior Thigh Region (Muscle Loss): mild depletion  Posterior Calf Region (Muscle Loss): moderate depletion                 Reason for Assessment    Reason For Assessment: consult  Diagnosis: other (see comments) (calculus of gallbaldder with cholecystitis without bilary obstruction)  Relevant Medical History: CAD, CHF, ICD, coloresicular fistula complicated by diverticulitis  Interdisciplinary Rounds: did not attend  General Information  "Comments: Pt resting in bed at time of visit, TPN running. Pt states her appetite has gotten better, able to eat some jello and italian ice. Reports poor PO intake for 10 days PTA. Since last admission, Pt has gained 14#s. UBW is 130-145 per Pt. NFPE completed 5/8 with mild to moderate wasting present. Pt diagnosed with malnutrition at last admission. Pt continues with malnutrition, but status improving with wt gain. RD to continue to monitor and f/u.  Nutrition Discharge Planning: pending clinical course    Nutrition Risk Screen    Nutrition Risk Screen: no indicators present    Nutrition/Diet History    Food Allergies: NKFA  Factors Affecting Nutritional Intake: clear liquid diet, abdominal pain    Anthropometrics    Temp: 97.1 °F (36.2 °C)  Height: 5' 3" (160 cm)  Height (inches): 63 in  Weight Method: Bed Scale  Weight: 59.8 kg (131 lb 13.4 oz)  Weight (lb): 131.84 lb  Ideal Body Weight (IBW), Female: 115 lb  % Ideal Body Weight, Female (lb): 114.64 %  BMI (Calculated): 23.4       Lab/Procedures/Meds    Pertinent Labs Reviewed: reviewed  Pertinent Labs Comments: sodium 133, calcium 8.4  Pertinent Medications Reviewed: reviewed  Pertinent Medications Comments: furosemide    Estimated/Assessed Needs    Weight Used For Calorie Calculations: 59.8 kg (131 lb 13.4 oz)  Energy Calorie Requirements (kcal): 1495 kcal  Energy Need Method: Kcal/kg (25 kcal/kg)  Protein Requirements: 60-72g (1-1.2g/kg)  Weight Used For Protein Calculations: 59.8 kg (131 lb 13.4 oz)  Fluid Requirements (mL): 1ml/kcal or per MD     RDA Method (mL): 1495         Nutrition Prescription Ordered    Current Diet Order: Clear liquid    Evaluation of Received Nutrient/Fluid Intake    Parenteral Calories (kcal): 453  Parenteral Protein (gm): 28  Lipid Calories (kcals): 500 kcals  GIR (Glucose Infusion Rate) (mg/kg/min): 1.17 mg/kg/min  Total Calories (kcal/kg): 953  % Kcal Needs: 64  Energy Calories Required: not meeting needs  Protein Required: not " meeting needs  Tolerance: tolerating  % Intake of Estimated Energy Needs: 50 - 75 %  % Meal Intake: 0 - 25 %    Nutrition Risk    Level of Risk/Frequency of Follow-up: low       Monitor and Evaluation    Food and Nutrient Intake: energy intake, food and beverage intake, parenteral nutrition intake  Food and Nutrient Adminstration: diet order, enteral and parenteral nutrition administration  Knowledge/Beliefs/Attitudes: food and nutrition knowledge/skill, beliefs and attitudes  Physical Activity and Function: nutrition-related ADLs and IADLs  Anthropometric Measurements: weight, weight change, body mass index  Biochemical Data, Medical Tests and Procedures: electrolyte and renal panel, lipid profile, gastrointestinal profile, glucose/endocrine profile, inflammatory profile  Nutrition-Focused Physical Findings: overall appearance, extremities, muscles and bones       Nutrition Follow-Up    RD Follow-up?: Yes

## 2022-05-08 NOTE — PLAN OF CARE
Patient had clear liquid diet, patient drinking her golytely throughout the day, up to BSC. IV antibiotics given throughout the day. Patient to be NPO after midnight, consents in the chart for tomorrows procedure.     Problem: Adult Inpatient Plan of Care  Goal: Plan of Care Review  Outcome: Ongoing, Progressing  Goal: Patient-Specific Goal (Individualized)  Outcome: Ongoing, Progressing  Goal: Absence of Hospital-Acquired Illness or Injury  Outcome: Ongoing, Progressing  Goal: Optimal Comfort and Wellbeing  Outcome: Ongoing, Progressing  Goal: Readiness for Transition of Care  Outcome: Ongoing, Progressing     Problem: Infection  Goal: Absence of Infection Signs and Symptoms  Outcome: Ongoing, Progressing     Problem: Fall Injury Risk  Goal: Absence of Fall and Fall-Related Injury  Outcome: Ongoing, Progressing

## 2022-05-08 NOTE — SUBJECTIVE & OBJECTIVE
Interval History: No acute events overnight.  Feels better today.  On Golytely.  Nausea improved.    Review of Systems   Constitutional:  Negative for chills, fatigue and fever.   Respiratory:  Negative for cough and shortness of breath.    Cardiovascular:  Negative for chest pain, palpitations and leg swelling.   Gastrointestinal:  Positive for abdominal pain and nausea. Negative for diarrhea and vomiting.   Genitourinary:  Negative for dysuria and urgency.   Neurological:  Negative for dizziness and headaches.   All other systems reviewed and are negative.  Objective:     Vital Signs (Most Recent):  Temp: 97.1 °F (36.2 °C) (05/08/22 0738)  Pulse: 71 (05/08/22 0738)  Resp: 17 (05/08/22 0738)  BP: (!) 115/56 (05/08/22 0738)  SpO2: 97 % (05/08/22 0738)   Vital Signs (24h Range):  Temp:  [96.1 °F (35.6 °C)-97.5 °F (36.4 °C)] 97.1 °F (36.2 °C)  Pulse:  [64-79] 71  Resp:  [16-18] 17  SpO2:  [92 %-98 %] 97 %  BP: ()/(48-56) 115/56     Weight: 59.8 kg (131 lb 13.4 oz)  Body mass index is 23.35 kg/m².    Intake/Output Summary (Last 24 hours) at 5/8/2022 1054  Last data filed at 5/7/2022 1800  Gross per 24 hour   Intake 550 ml   Output 2 ml   Net 548 ml      Physical Exam  Constitutional:       Appearance: Normal appearance. She is well-developed.   HENT:      Head: Normocephalic and atraumatic.   Cardiovascular:      Rate and Rhythm: Normal rate and regular rhythm.      Heart sounds: No murmur heard.  Pulmonary:      Effort: Pulmonary effort is normal. No respiratory distress.      Breath sounds: Normal breath sounds. No wheezing or rales.   Abdominal:      General: There is no distension.      Palpations: Abdomen is soft.      Tenderness: There is abdominal tenderness.   Musculoskeletal:         General: No deformity.      Comments: PICC in RUE   Skin:     General: Skin is warm.   Neurological:      Mental Status: She is alert.       Significant Labs: All pertinent labs within the past 24 hours have been  reviewed.    Significant Imaging: I have reviewed all pertinent imaging results/findings within the past 24 hours.

## 2022-05-08 NOTE — PROGRESS NOTES
[facility] Hospital Medicine  Progress Note    Patient Name: Odette Hart  MRN: [id number]  Patient Class: OP- Observation   Admission Date: 5/6/2022  Length of Stay: 0 days  Attending Physician: Linda Manning MD  Primary Care Provider: Braxton Barragan MD        Subjective:     Principal Problem:Calculus of gallbladder with cholecystitis without biliary obstruction        HPI:  Odette Hart is a 66 y.o. 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 Patient was at [facility] and has been complaining of constant LUQ abdominal pain, poor PO intake, and N/V. She has been having abdominal pain, nausea/vomiting, and inability to tolerate PO since she was diagnosed with diverticulitis. They thought her symptoms were due to flagyl and it was discontinued with improvement, however her symptoms returned 10 days ago. She had a CT of her abdomen on 05/04 which showed improving diverticulitis, persistent colovesicular fistula and distended gallbladder with pericholecystic fluid. She denies any fevers, chills, chest pain, dysuria.    General surgery consulted in ED and recommend HIDA.        Overview/Hospital Course:  Ms. Hart was admitted to Hospital Medicine for management of acute cholecystitis.  Gen Surg was consulted and HIDA was ordered.  CRS was consulted who plan for corrective surgery of her colovesical fistula with ostomy placement on 5/9 with combined beatriz.      Interval History: No acute events overnight.  Feels better today.  On Golytely.  Nausea improved.    Review of Systems   Constitutional:  Negative for chills, fatigue and fever.   Respiratory:  Negative for cough and shortness of breath.    Cardiovascular:  Negative for chest pain, palpitations and leg swelling.   Gastrointestinal:  Positive for abdominal pain  and nausea. Negative for diarrhea and vomiting.   Genitourinary:  Negative for dysuria and urgency.   Neurological:  Negative for dizziness and headaches.   All other systems reviewed and are negative.  Objective:     Vital Signs (Most Recent):  Temp: 97.1 °F (36.2 °C) (05/08/22 0738)  Pulse: 71 (05/08/22 0738)  Resp: 17 (05/08/22 0738)  BP: (!) 115/56 (05/08/22 0738)  SpO2: 97 % (05/08/22 0738)   Vital Signs (24h Range):  Temp:  [96.1 °F (35.6 °C)-97.5 °F (36.4 °C)] 97.1 °F (36.2 °C)  Pulse:  [64-79] 71  Resp:  [16-18] 17  SpO2:  [92 %-98 %] 97 %  BP: ()/(48-56) 115/56     Weight: 59.8 kg (131 lb 13.4 oz)  Body mass index is 23.35 kg/m².    Intake/Output Summary (Last 24 hours) at 5/8/2022 1054  Last data filed at 5/7/2022 1800  Gross per 24 hour   Intake 550 ml   Output 2 ml   Net 548 ml      Physical Exam  Constitutional:       Appearance: Normal appearance. She is well-developed.   HENT:      Head: Normocephalic and atraumatic.   Cardiovascular:      Rate and Rhythm: Normal rate and regular rhythm.      Heart sounds: No murmur heard.  Pulmonary:      Effort: Pulmonary effort is normal. No respiratory distress.      Breath sounds: Normal breath sounds. No wheezing or rales.   Abdominal:      General: There is no distension.      Palpations: Abdomen is soft.      Tenderness: There is abdominal tenderness.   Musculoskeletal:         General: No deformity.      Comments: PICC in RUE   Skin:     General: Skin is warm.   Neurological:      Mental Status: She is alert.       Significant Labs: All pertinent labs within the past 24 hours have been reviewed.    Significant Imaging: I have reviewed all pertinent imaging results/findings within the past 24 hours.      Assessment/Plan:      * Calculus of gallbladder with cholecystitis without biliary obstruction  · LUQ, N/V for past few months, worsened over the last few days   · US with cholelithiasis, gallbladder wall thickening and pericholecystic fluid.  No  sonographic Rasmussen's sign or wall hyperemia.    · Gen Surg consulted, appreciate recs  · HIDA ordered to determine if her cystic duct in patent  · Likely planning for cholecystectomy on Monday per discussion with CRS    Acute cholecystitis  · As above      Colovesical fistula  · Continue Cefepime and Flagyl  · CRS following, recs appreciated. Planning for corrective surgery on Monday with cholecystectomy.    Acute diverticulitis  · Chronic issue  · Has been on TPN  · CRS following    Anemia  · Stable.   · Blood smear notable for Rouleaux formation  · F/u SPEP, UPEP      Left upper quadrant abdominal pain  · As above      Severe malnutrition  · On TPN for chronic diverticulitis  · Nutrition consult    Urinary tract infection with pyuria  · In April - Pseudomonas / Enterobacteroid acute cystitis without hematuria  · Continue Cefepime and Flagyl  · F/u urine cx    Chronic combined systolic and diastolic congestive heart failure  · Hypoxic requiring 2L on 5/7  · CXR with edema, but on room air now  · Continue Coreg 12.5mg BID  · Continue Lasix 40mg PO daily  · Strict I&Os, daily weights    Primary hypertension  · Chronic and stable  · Continue Coreg 12.5mg BID  · Continue Lasix 40mg PO daily    Familial hypercholesterolemia  · Chronic issue but HDL 7  · Continue Zetia  · Might need Niacin    Coronary artery disease of native artery of native heart with stable angina pectoris  · Chronic and stable  · Continue Aspirin 81mg PO daily  · Continue Coreg 12.5mg PO BID  · Prasugrel held on admission for surgery    Automatic implantable cardioverter-defibrillator in situ  · Chronic and stable  · No acute issues      VTE Risk Mitigation (From admission, onward)         Ordered     enoxaparin injection 40 mg  Daily         05/06/22 2358     IP VTE HIGH RISK PATIENT  Once         05/06/22 2358     Place sequential compression device  Until discontinued         05/06/22 2358                Discharge Planning   SIVAN:      Code Status:  Full Code   Is the patient medically ready for discharge?:     Reason for patient still in hospital (select all that apply): Treatment                     Linda Manning MD  Department of [facility] Medicine   [facility]

## 2022-05-08 NOTE — HOSPITAL COURSE
Ms. Hart was admitted to Hospital Medicine for management of acute cholecystitis.  Gen Surg was consulted and HIDA was ordered.  CRS was consulted who plan for corrective surgery of her colovesical fistula with ostomy placement on 5/9 with combined cholecystectomy. Transferred to CRS service,  consulted for continued care in setting of ADHF. Started on diuresis with lasix 40mg IV BID, symptoms and oxygen requirements now much improved. Repeat echo unchanged with IVC of 3cm so lasix stopped. Now with decreased UOP and downtrending sodium. Will given gentle IVFs. Encouraged oral. Na improving. Psychology consulted for depression and anxiety. Poor motivation to engage in care. Still with SOB and dizziness on exertion. VQ scan to r/o PE.

## 2022-05-08 NOTE — ASSESSMENT & PLAN NOTE
· Continue Cefepime and Flagyl  · CRS following, recs appreciated. Planning for corrective surgery on Monday with cholecystectomy.

## 2022-05-08 NOTE — ASSESSMENT & PLAN NOTE
· LUQ, N/V for past few months, worsened over the last few days   · US with cholelithiasis, gallbladder wall thickening and pericholecystic fluid.  No sonographic Rasmussen's sign or wall hyperemia.    · Gen Surg consulted, appreciate recs  · HIDA ordered to determine if her cystic duct in patent  · Likely planning for cholecystectomy on Monday per discussion with CRS

## 2022-05-08 NOTE — PROGRESS NOTES
[facility]  Colorectal Surgery  Progress Note    Patient Name: Odette Hart  MRN: [id number]  Admission Date: 5/6/2022  Hospital Length of Stay: 0 days  Attending Physician: Robson Tiwari MD    Subjective:     Interval History: NAEON. Tolerating golytely. Continues to note mild nausea.     Post-Op Info:  * No surgery found *          Medications:  Continuous Infusions:   Standard Custom Day One ADULT TPN for patient with NO electrolyte abnormality or NO renal dysfunction (PERIPHERAL) 42 mL/hr at 05/07/22 2237     Scheduled Meds:   aspirin  81 mg Oral Daily    carvediloL  12.5 mg Oral BID WM    ceFEPime (MAXIPIME) IVPB  2 g Intravenous Q8H    dicyclomine  10 mg Oral TID    enoxaparin  40 mg Subcutaneous Daily    fat emulsion  250 mL Intravenous Daily    furosemide  40 mg Oral Daily    hydrocortisone   Rectal BID    metronidazole  500 mg Intravenous Q8H    ondansetron  4 mg Intravenous QID    pantoprazole  40 mg Oral Daily    scopolamine  1 patch Transdermal Q3 Days    simethicone  1 tablet Oral TID     PRN Meds:   acetaminophen    albuterol-ipratropium    ALPRAZolam    aluminum-magnesium hydroxide-simethicone    dextrose 10%    dextrose 10%    glucagon (human recombinant)    glucose    glucose    insulin aspart U-100    melatonin    naloxone    ondansetron    polyethylene glycol    prochlorperazine    simethicone    sodium chloride 0.9%    traMADoL        Objective:     Vital Signs (Most Recent):  Temp: 97.5 °F (36.4 °C) (05/08/22 0506)  Pulse: 72 (05/08/22 0506)  Resp: 16 (05/08/22 0506)  BP: (!) 116/54 (05/08/22 0506)  SpO2: (!) 94 % (05/08/22 0506) Vital Signs (24h Range):  Temp:  [96 °F (35.6 °C)-97.6 °F (36.4 °C)] 97.5 °F (36.4 °C)  Pulse:  [64-79] 72  Resp:  [16-18] 16  SpO2:  [92 %-98 %] 94 %  BP: ()/(48-57) 116/54     Intake/Output - Last 3 Shifts       05/06 0700  05/07 0659 05/07 0700  05/08 0659    P.O.  600    Total Intake(mL/kg)  600 (10)    Urine (mL/kg/hr)  300 3 (0)    Total Output 300 3    Net -300 +597                Physical Exam  HENT:      Head: Normocephalic.      Nose: Nose normal.   Eyes:      Extraocular Movements: Extraocular movements intact.   Pulmonary:      Effort: Pulmonary effort is normal.   Abdominal:      Comments: Soft, ND, mild suprapubic ttp, mild RUQ ttp   Musculoskeletal:         General: Normal range of motion.   Neurological:      Mental Status: She is alert.         Significant Labs:  CBC (Last 3 Results):   Recent Labs   Lab 05/06/22 0420 05/06/22 2150 05/06/22 2155 05/07/22  0613   WBC 5.74 2.00*  --  3.15*   RBC 3.66* 5.92*  --  3.74*   HGB 9.9* 16.1*  --  10.3*   HCT 32.6* 50.6* 46 32.7*    139*  --  234   MCV 89 86  --  87   MCH 27.0 27.2  --  27.5   MCHC 30.4* 31.8*  --  31.5*     CMP (Last 3 Results):   Recent Labs   Lab 05/06/22 0420 05/06/22 2150 05/07/22  0608   * 78 75   CALCIUM 8.3* 8.3* 8.6*   ALBUMIN 2.0* 2.2* 2.0*   PROT 6.9 7.0 6.5   * 135* 136   K 4.9 3.8 3.9   CO2 23 27 31*    99 99   BUN 22 20 21   CREATININE 0.6 0.6 0.6   ALKPHOS 79 85 82   ALT 22 25 27   AST 37 46* 46*   BILITOT 0.7 1.2* 1.2*       Significant Diagnostics:  I have reviewed all pertinent imaging results/findings within the past 24 hours.    Assessment/Plan:     Active Diagnoses:    Diagnosis Date Noted POA    PRINCIPAL PROBLEM:  Calculus of gallbladder with cholecystitis without biliary obstruction [K80.10] 05/06/2022 Yes    Anemia [D64.9] 05/07/2022 Unknown    Left upper quadrant abdominal pain [R10.12] 05/06/2022 Yes    Acute cholecystitis [K81.0] 05/06/2022 Yes    Severe malnutrition [E43] 04/21/2022 Yes    Colovesical fistula [N32.1] 02/26/2022 Yes    Urinary tract infection with pyuria [N39.0] 02/26/2022 Yes    Chronic combined systolic and diastolic congestive heart failure [I50.42] 02/17/2022 Yes    Primary hypertension [I10] 01/24/2022 Yes    Familial hypercholesterolemia [E78.01] 01/22/2022 Yes     Coronary artery disease of native artery of native heart with stable angina pectoris [I25.118] 04/19/2021 Yes    Acute diverticulitis [K57.92] 01/12/2021 Yes    Automatic implantable cardioverter-defibrillator in situ [Z95.810] 02/04/2019 Yes      Problems Resolved During this Admission:     66F c diverticulitis c/b colovesical and colovaginal fistula, well known to service with plan for repair on Monday. Admitted with acute cholecystitis. Continue to plan for surgery on Monday, discussed concomitant cholecystectomy with general surgery.   -cont abx therapy  -cont TPN  -clears until 2 hours prior to surgery   -cont bowel prep  -ok to continue ASA, but no therapeutic anticoagulation pending surgery  -ostomy consult placed for marking, which will likely need to be completed first thing Monday morning  -otherwise, will defer care to primary  -please call with questions      Martha Luis MD  Colorectal Surgery  [facility]

## 2022-05-08 NOTE — PLAN OF CARE
Recommendations    1. Recommend custom TPN of 215g Dextrose, 65g AA, and 250ml IV lipids to provide 1491 kcal, 65g protein, and GIR= 2.49.     2. As medically able, ADAT to low fiber/residue + Boost Plus TID. Texture per SLP.     3. RD following.     Goals: Pt to meet >75% EEN/EPN by RD follow up  Nutrition Goal Status: new  Communication of RD Recs:  (POC)

## 2022-05-08 NOTE — ASSESSMENT & PLAN NOTE
· In April - Pseudomonas / Enterobacteroid acute cystitis without hematuria  · Continue Cefepime and Flagyl  · F/u urine cx

## 2022-05-08 NOTE — ASSESSMENT & PLAN NOTE
· Chronic and stable  · Continue Aspirin 81mg PO daily  · Continue Coreg 12.5mg PO BID  · Prasugrel held on admission for surgery

## 2022-05-08 NOTE — ASSESSMENT & PLAN NOTE
Nutrition Problem:  Moderate/Severe Protein-Calorie Malnutrition  Malnutrition in the context of Chronic Illness/Injury    Related to (etiology):  Inadequate energy intake, altered GI function    Signs and Symptoms (as evidenced by):  Energy Intake: <75% of estimated energy requirement for >/=1 month   Body Fat Depletion: mild depletion of orbitals and triceps   Muscle Mass Depletion: mild and moderate depletion of clavicle region, interosseous muscle and lower extremities     Interventions(treatment strategy):  Collaboration of nutrition care with other providers  TPN    Nutrition Diagnosis Status:  Improving

## 2022-05-09 ENCOUNTER — DOCUMENTATION ONLY (OUTPATIENT)
Dept: ELECTROPHYSIOLOGY | Facility: CLINIC | Age: 67
End: 2022-05-09
Payer: MEDICARE

## 2022-05-09 ENCOUNTER — ANESTHESIA (OUTPATIENT)
Dept: SURGERY | Facility: HOSPITAL | Age: 67
DRG: 329 | End: 2022-05-09
Payer: MEDICARE

## 2022-05-09 LAB
ALBUMIN SERPL BCP-MCNC: 2.1 G/DL (ref 3.5–5.2)
ALBUMIN SERPL ELPH-MCNC: 2.52 G/DL (ref 3.35–5.55)
ALP SERPL-CCNC: 83 U/L (ref 55–135)
ALPHA1 GLOB SERPL ELPH-MCNC: 0.37 G/DL (ref 0.17–0.41)
ALPHA2 GLOB SERPL ELPH-MCNC: 0.66 G/DL (ref 0.43–0.99)
ALT SERPL W/O P-5'-P-CCNC: 28 U/L (ref 10–44)
ANION GAP SERPL CALC-SCNC: 8 MMOL/L (ref 8–16)
ANION GAP SERPL CALC-SCNC: 9 MMOL/L (ref 8–16)
ANISOCYTOSIS BLD QL SMEAR: SLIGHT
AST SERPL-CCNC: 43 U/L (ref 10–40)
B-GLOBULIN SERPL ELPH-MCNC: 1.07 G/DL (ref 0.5–1.1)
BACTERIA UR CULT: ABNORMAL
BASOPHILS # BLD AUTO: 0.03 K/UL (ref 0–0.2)
BASOPHILS NFR BLD: 1 % (ref 0–1.9)
BILIRUB SERPL-MCNC: 1.1 MG/DL (ref 0.1–1)
BNP SERPL-MCNC: 2792 PG/ML (ref 0–99)
BUN SERPL-MCNC: 12 MG/DL (ref 8–23)
BUN SERPL-MCNC: 13 MG/DL (ref 8–23)
CALCIUM SERPL-MCNC: 7.8 MG/DL (ref 8.7–10.5)
CALCIUM SERPL-MCNC: 8.5 MG/DL (ref 8.7–10.5)
CHLORIDE SERPL-SCNC: 102 MMOL/L (ref 95–110)
CHLORIDE SERPL-SCNC: 97 MMOL/L (ref 95–110)
CO2 SERPL-SCNC: 27 MMOL/L (ref 23–29)
CO2 SERPL-SCNC: 35 MMOL/L (ref 23–29)
CREAT SERPL-MCNC: 0.6 MG/DL (ref 0.5–1.4)
CREAT SERPL-MCNC: 0.6 MG/DL (ref 0.5–1.4)
DIFFERENTIAL METHOD: ABNORMAL
EOSINOPHIL # BLD AUTO: 0 K/UL (ref 0–0.5)
EOSINOPHIL NFR BLD: 1.3 % (ref 0–8)
ERYTHROCYTE [DISTWIDTH] IN BLOOD BY AUTOMATED COUNT: 22.9 % (ref 11.5–14.5)
ERYTHROCYTE [DISTWIDTH] IN BLOOD BY AUTOMATED COUNT: 23.4 % (ref 11.5–14.5)
EST. GFR  (AFRICAN AMERICAN): >60 ML/MIN/1.73 M^2
EST. GFR  (AFRICAN AMERICAN): >60 ML/MIN/1.73 M^2
EST. GFR  (NON AFRICAN AMERICAN): >60 ML/MIN/1.73 M^2
EST. GFR  (NON AFRICAN AMERICAN): >60 ML/MIN/1.73 M^2
GAMMA GLOB SERPL ELPH-MCNC: 2.28 G/DL (ref 0.67–1.58)
GLUCOSE SERPL-MCNC: 138 MG/DL (ref 70–110)
GLUCOSE SERPL-MCNC: 83 MG/DL (ref 70–110)
HCT VFR BLD AUTO: 35.4 % (ref 37–48.5)
HCT VFR BLD AUTO: 36.5 % (ref 37–48.5)
HGB BLD-MCNC: 11 G/DL (ref 12–16)
HGB BLD-MCNC: 11.5 G/DL (ref 12–16)
HYPOCHROMIA BLD QL SMEAR: ABNORMAL
IMM GRANULOCYTES # BLD AUTO: 0 K/UL (ref 0–0.04)
IMM GRANULOCYTES NFR BLD AUTO: 0 % (ref 0–0.5)
LYMPHOCYTES # BLD AUTO: 2.2 K/UL (ref 1–4.8)
LYMPHOCYTES NFR BLD: 73.5 % (ref 18–48)
MAGNESIUM SERPL-MCNC: 1.7 MG/DL (ref 1.6–2.6)
MCH RBC QN AUTO: 27.6 PG (ref 27–31)
MCH RBC QN AUTO: 27.6 PG (ref 27–31)
MCHC RBC AUTO-ENTMCNC: 31.1 G/DL (ref 32–36)
MCHC RBC AUTO-ENTMCNC: 31.5 G/DL (ref 32–36)
MCV RBC AUTO: 88 FL (ref 82–98)
MCV RBC AUTO: 89 FL (ref 82–98)
MONOCYTES # BLD AUTO: 0.5 K/UL (ref 0.3–1)
MONOCYTES NFR BLD: 17.4 % (ref 4–15)
NEUTROPHILS # BLD AUTO: 0.2 K/UL (ref 1.8–7.7)
NEUTROPHILS NFR BLD: 6.8 % (ref 38–73)
NRBC BLD-RTO: 0 /100 WBC
PATHOLOGIST INTERPRETATION SPE: NORMAL
PHOSPHATE SERPL-MCNC: 3.1 MG/DL (ref 2.7–4.5)
PLATELET # BLD AUTO: 250 K/UL (ref 150–450)
PLATELET # BLD AUTO: 263 K/UL (ref 150–450)
PLATELET BLD QL SMEAR: ABNORMAL
PMV BLD AUTO: 10.9 FL (ref 9.2–12.9)
PMV BLD AUTO: 11.5 FL (ref 9.2–12.9)
POCT GLUCOSE: 110 MG/DL (ref 70–110)
POCT GLUCOSE: 114 MG/DL (ref 70–110)
POCT GLUCOSE: 141 MG/DL (ref 70–110)
POIKILOCYTOSIS BLD QL SMEAR: SLIGHT
POLYCHROMASIA BLD QL SMEAR: ABNORMAL
POTASSIUM SERPL-SCNC: 3.1 MMOL/L (ref 3.5–5.1)
POTASSIUM SERPL-SCNC: 3.5 MMOL/L (ref 3.5–5.1)
PROT SERPL-MCNC: 6.9 G/DL (ref 6–8.4)
PROT SERPL-MCNC: 7.1 G/DL (ref 6–8.4)
RBC # BLD AUTO: 3.99 M/UL (ref 4–5.4)
RBC # BLD AUTO: 4.17 M/UL (ref 4–5.4)
SODIUM SERPL-SCNC: 138 MMOL/L (ref 136–145)
SODIUM SERPL-SCNC: 140 MMOL/L (ref 136–145)
TOXIC GRANULES BLD QL SMEAR: PRESENT
WBC # BLD AUTO: 1.49 K/UL (ref 3.9–12.7)
WBC # BLD AUTO: 2.98 K/UL (ref 3.9–12.7)

## 2022-05-09 PROCEDURE — D9220A PRA ANESTHESIA: ICD-10-PCS | Mod: ANES,,, | Performed by: ANESTHESIOLOGY

## 2022-05-09 PROCEDURE — 88304 TISSUE EXAM BY PATHOLOGIST: CPT | Mod: 26,,, | Performed by: STUDENT IN AN ORGANIZED HEALTH CARE EDUCATION/TRAINING PROGRAM

## 2022-05-09 PROCEDURE — 71000039 HC RECOVERY, EACH ADD'L HOUR: Performed by: STUDENT IN AN ORGANIZED HEALTH CARE EDUCATION/TRAINING PROGRAM

## 2022-05-09 PROCEDURE — 25000003 PHARM REV CODE 250: Performed by: HOSPITALIST

## 2022-05-09 PROCEDURE — 37000009 HC ANESTHESIA EA ADD 15 MINS: Performed by: STUDENT IN AN ORGANIZED HEALTH CARE EDUCATION/TRAINING PROGRAM

## 2022-05-09 PROCEDURE — C1765 ADHESION BARRIER: HCPCS | Performed by: STUDENT IN AN ORGANIZED HEALTH CARE EDUCATION/TRAINING PROGRAM

## 2022-05-09 PROCEDURE — 88307 TISSUE EXAM BY PATHOLOGIST: CPT | Mod: 26,,, | Performed by: STUDENT IN AN ORGANIZED HEALTH CARE EDUCATION/TRAINING PROGRAM

## 2022-05-09 PROCEDURE — 63600175 PHARM REV CODE 636 W HCPCS: Performed by: STUDENT IN AN ORGANIZED HEALTH CARE EDUCATION/TRAINING PROGRAM

## 2022-05-09 PROCEDURE — 85025 COMPLETE CBC W/AUTO DIFF WBC: CPT | Performed by: PHYSICIAN ASSISTANT

## 2022-05-09 PROCEDURE — 83880 ASSAY OF NATRIURETIC PEPTIDE: CPT | Performed by: SURGERY

## 2022-05-09 PROCEDURE — 25000003 PHARM REV CODE 250: Performed by: SURGERY

## 2022-05-09 PROCEDURE — 49905 PR OMENTAL FLAP,INTRA-ABDOMINAL: ICD-10-PCS | Mod: ,,, | Performed by: STUDENT IN AN ORGANIZED HEALTH CARE EDUCATION/TRAINING PROGRAM

## 2022-05-09 PROCEDURE — D9220A PRA ANESTHESIA: Mod: CRNA,,, | Performed by: NURSE ANESTHETIST, CERTIFIED REGISTERED

## 2022-05-09 PROCEDURE — 27201037 HC PRESSURE MONITORING SET UP

## 2022-05-09 PROCEDURE — 63600175 PHARM REV CODE 636 W HCPCS: Performed by: NURSE ANESTHETIST, CERTIFIED REGISTERED

## 2022-05-09 PROCEDURE — 44143 PR PART REMOVAL COLON W END COLOSTOMY: ICD-10-PCS | Mod: ,,, | Performed by: STUDENT IN AN ORGANIZED HEALTH CARE EDUCATION/TRAINING PROGRAM

## 2022-05-09 PROCEDURE — 36000709 HC OR TIME LEV III EA ADD 15 MIN: Performed by: STUDENT IN AN ORGANIZED HEALTH CARE EDUCATION/TRAINING PROGRAM

## 2022-05-09 PROCEDURE — 63600175 PHARM REV CODE 636 W HCPCS: Performed by: HOSPITALIST

## 2022-05-09 PROCEDURE — 71000033 HC RECOVERY, INTIAL HOUR: Performed by: STUDENT IN AN ORGANIZED HEALTH CARE EDUCATION/TRAINING PROGRAM

## 2022-05-09 PROCEDURE — 44143 PARTIAL REMOVAL OF COLON: CPT | Mod: ,,, | Performed by: STUDENT IN AN ORGANIZED HEALTH CARE EDUCATION/TRAINING PROGRAM

## 2022-05-09 PROCEDURE — 47562 LAPAROSCOPIC CHOLECYSTECTOMY: CPT | Mod: ,,, | Performed by: SURGERY

## 2022-05-09 PROCEDURE — 63600175 PHARM REV CODE 636 W HCPCS: Performed by: SURGERY

## 2022-05-09 PROCEDURE — 36415 COLL VENOUS BLD VENIPUNCTURE: CPT | Performed by: PHYSICIAN ASSISTANT

## 2022-05-09 PROCEDURE — 44955 PR APPENDECTOMY,W OTHR PROC: ICD-10-PCS | Mod: ,,, | Performed by: STUDENT IN AN ORGANIZED HEALTH CARE EDUCATION/TRAINING PROGRAM

## 2022-05-09 PROCEDURE — 84100 ASSAY OF PHOSPHORUS: CPT | Performed by: PHYSICIAN ASSISTANT

## 2022-05-09 PROCEDURE — 85027 COMPLETE CBC AUTOMATED: CPT | Performed by: SURGERY

## 2022-05-09 PROCEDURE — S0030 INJECTION, METRONIDAZOLE: HCPCS | Performed by: HOSPITALIST

## 2022-05-09 PROCEDURE — D9220A PRA ANESTHESIA: Mod: ANES,,, | Performed by: ANESTHESIOLOGY

## 2022-05-09 PROCEDURE — 52005 CYSTO W/URTRL CATHJ: CPT | Mod: ,,, | Performed by: UROLOGY

## 2022-05-09 PROCEDURE — 88307 TISSUE EXAM BY PATHOLOGIST: CPT | Performed by: STUDENT IN AN ORGANIZED HEALTH CARE EDUCATION/TRAINING PROGRAM

## 2022-05-09 PROCEDURE — 88304 TISSUE EXAM BY PATHOLOGIST: CPT | Performed by: STUDENT IN AN ORGANIZED HEALTH CARE EDUCATION/TRAINING PROGRAM

## 2022-05-09 PROCEDURE — 83735 ASSAY OF MAGNESIUM: CPT | Performed by: PHYSICIAN ASSISTANT

## 2022-05-09 PROCEDURE — 80053 COMPREHEN METABOLIC PANEL: CPT | Performed by: PHYSICIAN ASSISTANT

## 2022-05-09 PROCEDURE — 63600175 PHARM REV CODE 636 W HCPCS: Performed by: PHYSICIAN ASSISTANT

## 2022-05-09 PROCEDURE — 71000015 HC POSTOP RECOV 1ST HR: Performed by: STUDENT IN AN ORGANIZED HEALTH CARE EDUCATION/TRAINING PROGRAM

## 2022-05-09 PROCEDURE — 94761 N-INVAS EAR/PLS OXIMETRY MLT: CPT

## 2022-05-09 PROCEDURE — 25000003 PHARM REV CODE 250: Performed by: NURSE ANESTHETIST, CERTIFIED REGISTERED

## 2022-05-09 PROCEDURE — 37000008 HC ANESTHESIA 1ST 15 MINUTES: Performed by: STUDENT IN AN ORGANIZED HEALTH CARE EDUCATION/TRAINING PROGRAM

## 2022-05-09 PROCEDURE — 80048 BASIC METABOLIC PNL TOTAL CA: CPT | Mod: XB | Performed by: SURGERY

## 2022-05-09 PROCEDURE — 71000016 HC POSTOP RECOV ADDL HR: Performed by: STUDENT IN AN ORGANIZED HEALTH CARE EDUCATION/TRAINING PROGRAM

## 2022-05-09 PROCEDURE — D9220A PRA ANESTHESIA: ICD-10-PCS | Mod: CRNA,,, | Performed by: NURSE ANESTHETIST, CERTIFIED REGISTERED

## 2022-05-09 PROCEDURE — 36000708 HC OR TIME LEV III 1ST 15 MIN: Performed by: STUDENT IN AN ORGANIZED HEALTH CARE EDUCATION/TRAINING PROGRAM

## 2022-05-09 PROCEDURE — 52005 PR CYSTOURETHROSCOPY,URETER CATHETER: ICD-10-PCS | Mod: ,,, | Performed by: UROLOGY

## 2022-05-09 PROCEDURE — 36620 PR INSERT CATH,ART,PERCUT,SHORTTERM: ICD-10-PCS | Mod: 59,,, | Performed by: ANESTHESIOLOGY

## 2022-05-09 PROCEDURE — 25000003 PHARM REV CODE 250: Performed by: PHYSICIAN ASSISTANT

## 2022-05-09 PROCEDURE — 49905 OMENTAL FLAP INTRA-ABDOM: CPT | Mod: ,,, | Performed by: STUDENT IN AN ORGANIZED HEALTH CARE EDUCATION/TRAINING PROGRAM

## 2022-05-09 PROCEDURE — C1758 CATHETER, URETERAL: HCPCS | Performed by: STUDENT IN AN ORGANIZED HEALTH CARE EDUCATION/TRAINING PROGRAM

## 2022-05-09 PROCEDURE — 88304 PR  SURG PATH,LEVEL III: ICD-10-PCS | Mod: 26,,, | Performed by: STUDENT IN AN ORGANIZED HEALTH CARE EDUCATION/TRAINING PROGRAM

## 2022-05-09 PROCEDURE — 20600001 HC STEP DOWN PRIVATE ROOM

## 2022-05-09 PROCEDURE — S0030 INJECTION, METRONIDAZOLE: HCPCS | Performed by: NURSE ANESTHETIST, CERTIFIED REGISTERED

## 2022-05-09 PROCEDURE — 44955 APPENDECTOMY ADD-ON: CPT | Mod: ,,, | Performed by: STUDENT IN AN ORGANIZED HEALTH CARE EDUCATION/TRAINING PROGRAM

## 2022-05-09 PROCEDURE — 27201423 OPTIME MED/SURG SUP & DEVICES STERILE SUPPLY: Performed by: STUDENT IN AN ORGANIZED HEALTH CARE EDUCATION/TRAINING PROGRAM

## 2022-05-09 PROCEDURE — 36620 INSERTION CATHETER ARTERY: CPT | Mod: 59,,, | Performed by: ANESTHESIOLOGY

## 2022-05-09 PROCEDURE — 47562 PR LAP,CHOLECYSTECTOMY: ICD-10-PCS | Mod: ,,, | Performed by: SURGERY

## 2022-05-09 PROCEDURE — 88307 PR  SURG PATH,LEVEL V: ICD-10-PCS | Mod: 26,,, | Performed by: STUDENT IN AN ORGANIZED HEALTH CARE EDUCATION/TRAINING PROGRAM

## 2022-05-09 DEVICE — BARRIER SEPRAFILM ADHESION: Type: IMPLANTABLE DEVICE | Site: ABDOMEN | Status: FUNCTIONAL

## 2022-05-09 RX ORDER — LIDOCAINE HYDROCHLORIDE ANHYDROUS AND DEXTROSE MONOHYDRATE .8; 5 G/100ML; G/100ML
INJECTION, SOLUTION INTRAVENOUS CONTINUOUS PRN
Status: DISCONTINUED | OUTPATIENT
Start: 2022-05-09 | End: 2022-05-09

## 2022-05-09 RX ORDER — NALOXONE HCL 0.4 MG/ML
0.02 VIAL (ML) INJECTION
Status: DISCONTINUED | OUTPATIENT
Start: 2022-05-09 | End: 2022-05-10

## 2022-05-09 RX ORDER — PROCHLORPERAZINE EDISYLATE 5 MG/ML
5 INJECTION INTRAMUSCULAR; INTRAVENOUS EVERY 30 MIN PRN
Status: DISCONTINUED | OUTPATIENT
Start: 2022-05-09 | End: 2022-05-09 | Stop reason: HOSPADM

## 2022-05-09 RX ORDER — VANCOMYCIN HCL IN 5 % DEXTROSE 1G/250ML
15 PLASTIC BAG, INJECTION (ML) INTRAVENOUS ONCE
Status: DISCONTINUED | OUTPATIENT
Start: 2022-05-09 | End: 2022-05-09

## 2022-05-09 RX ORDER — FENTANYL CITRATE 50 UG/ML
25 INJECTION, SOLUTION INTRAMUSCULAR; INTRAVENOUS EVERY 5 MIN PRN
Status: DISCONTINUED | OUTPATIENT
Start: 2022-05-09 | End: 2022-05-09 | Stop reason: HOSPADM

## 2022-05-09 RX ORDER — FUROSEMIDE 10 MG/ML
40 INJECTION INTRAMUSCULAR; INTRAVENOUS 2 TIMES DAILY
Status: DISCONTINUED | OUTPATIENT
Start: 2022-05-09 | End: 2022-05-11

## 2022-05-09 RX ORDER — ONDANSETRON 2 MG/ML
4 INJECTION INTRAMUSCULAR; INTRAVENOUS EVERY 8 HOURS PRN
Status: DISCONTINUED | OUTPATIENT
Start: 2022-05-09 | End: 2022-05-09

## 2022-05-09 RX ORDER — LIDOCAINE HYDROCHLORIDE 20 MG/ML
INJECTION, SOLUTION EPIDURAL; INFILTRATION; INTRACAUDAL; PERINEURAL
Status: DISCONTINUED | OUTPATIENT
Start: 2022-05-09 | End: 2022-05-09

## 2022-05-09 RX ORDER — ETOMIDATE 2 MG/ML
INJECTION INTRAVENOUS
Status: DISCONTINUED | OUTPATIENT
Start: 2022-05-09 | End: 2022-05-09

## 2022-05-09 RX ORDER — ONDANSETRON 2 MG/ML
INJECTION INTRAMUSCULAR; INTRAVENOUS
Status: DISCONTINUED | OUTPATIENT
Start: 2022-05-09 | End: 2022-05-09

## 2022-05-09 RX ORDER — MUPIROCIN 20 MG/G
OINTMENT TOPICAL 2 TIMES DAILY
Status: COMPLETED | OUTPATIENT
Start: 2022-05-09 | End: 2022-01-01

## 2022-05-09 RX ORDER — ACETAMINOPHEN 10 MG/ML
INJECTION, SOLUTION INTRAVENOUS
Status: DISCONTINUED | OUTPATIENT
Start: 2022-05-09 | End: 2022-05-09

## 2022-05-09 RX ORDER — DEXAMETHASONE SODIUM PHOSPHATE 4 MG/ML
INJECTION, SOLUTION INTRA-ARTICULAR; INTRALESIONAL; INTRAMUSCULAR; INTRAVENOUS; SOFT TISSUE
Status: DISCONTINUED | OUTPATIENT
Start: 2022-05-09 | End: 2022-05-09

## 2022-05-09 RX ORDER — SODIUM CHLORIDE 0.9 % (FLUSH) 0.9 %
10 SYRINGE (ML) INJECTION
Status: DISCONTINUED | OUTPATIENT
Start: 2022-05-09 | End: 2022-01-01 | Stop reason: HOSPADM

## 2022-05-09 RX ORDER — MIDAZOLAM HYDROCHLORIDE 1 MG/ML
INJECTION, SOLUTION INTRAMUSCULAR; INTRAVENOUS
Status: DISCONTINUED | OUTPATIENT
Start: 2022-05-09 | End: 2022-05-09

## 2022-05-09 RX ORDER — ROCURONIUM BROMIDE 10 MG/ML
INJECTION, SOLUTION INTRAVENOUS
Status: DISCONTINUED | OUTPATIENT
Start: 2022-05-09 | End: 2022-05-09

## 2022-05-09 RX ORDER — NOREPINEPHRINE BITARTRATE 1 MG/ML
INJECTION, SOLUTION INTRAVENOUS CONTINUOUS PRN
Status: DISCONTINUED | OUTPATIENT
Start: 2022-05-09 | End: 2022-05-09

## 2022-05-09 RX ORDER — HYDROMORPHONE HYDROCHLORIDE 1 MG/ML
0.2 INJECTION, SOLUTION INTRAMUSCULAR; INTRAVENOUS; SUBCUTANEOUS EVERY 5 MIN PRN
Status: DISCONTINUED | OUTPATIENT
Start: 2022-05-09 | End: 2022-05-09 | Stop reason: HOSPADM

## 2022-05-09 RX ORDER — SODIUM CHLORIDE 0.9 % (FLUSH) 0.9 %
3 SYRINGE (ML) INJECTION
Status: DISCONTINUED | OUTPATIENT
Start: 2022-05-09 | End: 2022-05-09 | Stop reason: HOSPADM

## 2022-05-09 RX ORDER — HALOPERIDOL 5 MG/ML
0.5 INJECTION INTRAMUSCULAR EVERY 10 MIN PRN
Status: DISCONTINUED | OUTPATIENT
Start: 2022-05-09 | End: 2022-05-09 | Stop reason: HOSPADM

## 2022-05-09 RX ORDER — NOREPINEPHRINE BITARTRATE 1 MG/ML
INJECTION, SOLUTION INTRAVENOUS
Status: DISCONTINUED | OUTPATIENT
Start: 2022-05-09 | End: 2022-05-09

## 2022-05-09 RX ORDER — FENTANYL CITRATE 50 UG/ML
INJECTION, SOLUTION INTRAMUSCULAR; INTRAVENOUS
Status: DISCONTINUED | OUTPATIENT
Start: 2022-05-09 | End: 2022-05-09

## 2022-05-09 RX ORDER — HYDROMORPHONE HCL IN 0.9% NACL 6 MG/30 ML
PATIENT CONTROLLED ANALGESIA SYRINGE INTRAVENOUS CONTINUOUS
Status: DISCONTINUED | OUTPATIENT
Start: 2022-05-09 | End: 2022-05-10

## 2022-05-09 RX ORDER — KETAMINE HCL IN 0.9 % NACL 50 MG/5 ML
SYRINGE (ML) INTRAVENOUS
Status: DISCONTINUED | OUTPATIENT
Start: 2022-05-09 | End: 2022-05-09

## 2022-05-09 RX ORDER — ACETAMINOPHEN 500 MG
1000 TABLET ORAL EVERY 8 HOURS
Status: DISCONTINUED | OUTPATIENT
Start: 2022-05-10 | End: 2022-01-01 | Stop reason: HOSPADM

## 2022-05-09 RX ORDER — METRONIDAZOLE 500 MG/100ML
INJECTION, SOLUTION INTRAVENOUS
Status: DISCONTINUED | OUTPATIENT
Start: 2022-05-09 | End: 2022-05-09

## 2022-05-09 RX ORDER — NEOSTIGMINE METHYLSULFATE 0.5 MG/ML
INJECTION, SOLUTION INTRAVENOUS
Status: DISCONTINUED | OUTPATIENT
Start: 2022-05-09 | End: 2022-05-09

## 2022-05-09 RX ORDER — VANCOMYCIN HCL IN 5 % DEXTROSE 1G/250ML
15 PLASTIC BAG, INJECTION (ML) INTRAVENOUS ONCE
Status: COMPLETED | OUTPATIENT
Start: 2022-05-09 | End: 2022-05-09

## 2022-05-09 RX ORDER — ACETAMINOPHEN 10 MG/ML
1000 INJECTION, SOLUTION INTRAVENOUS EVERY 8 HOURS
Status: COMPLETED | OUTPATIENT
Start: 2022-05-09 | End: 2022-05-10

## 2022-05-09 RX ORDER — DIPHENHYDRAMINE HYDROCHLORIDE 50 MG/ML
25 INJECTION INTRAMUSCULAR; INTRAVENOUS EVERY 6 HOURS PRN
Status: DISCONTINUED | OUTPATIENT
Start: 2022-05-09 | End: 2022-05-09 | Stop reason: HOSPADM

## 2022-05-09 RX ADMIN — FUROSEMIDE 40 MG: 10 INJECTION, SOLUTION INTRAVENOUS at 05:05

## 2022-05-09 RX ADMIN — ONDANSETRON 4 MG: 2 INJECTION INTRAMUSCULAR; INTRAVENOUS at 04:05

## 2022-05-09 RX ADMIN — Medication 30 MG: at 07:05

## 2022-05-09 RX ADMIN — EZETIMIBE 10 MG: 10 TABLET ORAL at 09:05

## 2022-05-09 RX ADMIN — ONDANSETRON 4 MG: 2 INJECTION INTRAMUSCULAR; INTRAVENOUS at 10:05

## 2022-05-09 RX ADMIN — LIDOCAINE HYDROCHLORIDE 100 MG: 20 INJECTION, SOLUTION EPIDURAL; INFILTRATION; INTRACAUDAL at 07:05

## 2022-05-09 RX ADMIN — ROCURONIUM BROMIDE 50 MG: 10 INJECTION, SOLUTION INTRAVENOUS at 07:05

## 2022-05-09 RX ADMIN — LIDOCAINE HYDROCHLORIDE 0.02 MG/KG/MIN: 8 INJECTION, SOLUTION INTRAVENOUS at 08:05

## 2022-05-09 RX ADMIN — FUROSEMIDE 40 MG: 40 TABLET ORAL at 04:05

## 2022-05-09 RX ADMIN — SODIUM CHLORIDE: 9 INJECTION, SOLUTION INTRAVENOUS at 07:05

## 2022-05-09 RX ADMIN — ONDANSETRON 4 MG: 2 INJECTION INTRAMUSCULAR; INTRAVENOUS at 01:05

## 2022-05-09 RX ADMIN — METRONIDAZOLE 500 MG: 500 SOLUTION INTRAVENOUS at 07:05

## 2022-05-09 RX ADMIN — SODIUM CHLORIDE, SODIUM GLUCONATE, SODIUM ACETATE, POTASSIUM CHLORIDE, MAGNESIUM CHLORIDE, SODIUM PHOSPHATE, DIBASIC, AND POTASSIUM PHOSPHATE: .53; .5; .37; .037; .03; .012; .00082 INJECTION, SOLUTION INTRAVENOUS at 10:05

## 2022-05-09 RX ADMIN — VANCOMYCIN HYDROCHLORIDE 1000 MG: 1 INJECTION, POWDER, LYOPHILIZED, FOR SOLUTION INTRAVENOUS at 07:05

## 2022-05-09 RX ADMIN — ONDANSETRON 4 MG: 2 INJECTION INTRAMUSCULAR; INTRAVENOUS at 09:05

## 2022-05-09 RX ADMIN — ROCURONIUM BROMIDE 10 MG: 10 INJECTION, SOLUTION INTRAVENOUS at 09:05

## 2022-05-09 RX ADMIN — NEOSTIGMINE METHYLSULFATE 4 MG: 0.5 INJECTION, SOLUTION INTRAVENOUS at 11:05

## 2022-05-09 RX ADMIN — METRONIDAZOLE 500 MG: 500 SOLUTION INTRAVENOUS at 04:05

## 2022-05-09 RX ADMIN — GLYCOPYRROLATE 0.6 MG: 0.2 INJECTION INTRAMUSCULAR; INTRAVENOUS at 11:05

## 2022-05-09 RX ADMIN — NOREPINEPHRINE BITARTRATE 3 MCG: 1 INJECTION, SOLUTION, CONCENTRATE INTRAVENOUS at 09:05

## 2022-05-09 RX ADMIN — PANTOPRAZOLE SODIUM 40 MG: 40 TABLET, DELAYED RELEASE ORAL at 04:05

## 2022-05-09 RX ADMIN — MIDAZOLAM 1 MG: 1 INJECTION INTRAMUSCULAR; INTRAVENOUS at 07:05

## 2022-05-09 RX ADMIN — ROCURONIUM BROMIDE 20 MG: 10 INJECTION, SOLUTION INTRAVENOUS at 09:05

## 2022-05-09 RX ADMIN — ACETAMINOPHEN 1000 MG: 10 INJECTION INTRAVENOUS at 10:05

## 2022-05-09 RX ADMIN — FENTANYL CITRATE 100 MCG: 50 INJECTION INTRAMUSCULAR; INTRAVENOUS at 07:05

## 2022-05-09 RX ADMIN — MUPIROCIN: 20 OINTMENT TOPICAL at 09:05

## 2022-05-09 RX ADMIN — FENTANYL CITRATE 25 MCG: 50 INJECTION INTRAMUSCULAR; INTRAVENOUS at 11:05

## 2022-05-09 RX ADMIN — ACETAMINOPHEN 1000 MG: 10 INJECTION INTRAVENOUS at 05:05

## 2022-05-09 RX ADMIN — NOREPINEPHRINE BITARTRATE 0.06 MCG/KG/MIN: 1 INJECTION, SOLUTION INTRAVENOUS at 09:05

## 2022-05-09 RX ADMIN — CEFEPIME HYDROCHLORIDE 2 G: 2 INJECTION, SOLUTION INTRAVENOUS at 02:05

## 2022-05-09 RX ADMIN — DEXAMETHASONE SODIUM PHOSPHATE 8 MG: 4 INJECTION INTRA-ARTICULAR; INTRALESIONAL; INTRAMUSCULAR; INTRAVENOUS; SOFT TISSUE at 08:05

## 2022-05-09 RX ADMIN — NOREPINEPHRINE BITARTRATE 6 MCG: 1 INJECTION, SOLUTION, CONCENTRATE INTRAVENOUS at 08:05

## 2022-05-09 RX ADMIN — ETOMIDATE 8 MG: 2 INJECTION INTRAVENOUS at 07:05

## 2022-05-09 RX ADMIN — Medication 10 MG: at 10:05

## 2022-05-09 RX ADMIN — ASPIRIN 81 MG: 81 TABLET, COATED ORAL at 04:05

## 2022-05-09 RX ADMIN — CEFTRIAXONE 2 G: 1 INJECTION, POWDER, FOR SOLUTION INTRAMUSCULAR; INTRAVENOUS at 07:05

## 2022-05-09 RX ADMIN — Medication: at 12:05

## 2022-05-09 RX ADMIN — CEFEPIME HYDROCHLORIDE 2 G: 2 INJECTION, SOLUTION INTRAVENOUS at 09:05

## 2022-05-09 RX ADMIN — METRONIDAZOLE 500 MG: 500 TABLET ORAL at 09:05

## 2022-05-09 RX ADMIN — FENTANYL CITRATE 50 MCG: 50 INJECTION INTRAMUSCULAR; INTRAVENOUS at 10:05

## 2022-05-09 RX ADMIN — NOREPINEPHRINE BITARTRATE 6 MCG: 1 INJECTION, SOLUTION, CONCENTRATE INTRAVENOUS at 09:05

## 2022-05-09 NOTE — ANESTHESIA PROCEDURE NOTES
Intubation    Date/Time: 5/9/2022 7:38 AM  Performed by: Nicole A. Lombardi, CRNA  Authorized by: Uriel Bland MD     Intubation:     Induction:  Intravenous    Intubated:  Postinduction    Mask Ventilation:  Easy mask    Attempts:  1    Attempted By:  CRNA    Blade:  Leonides 3    Laryngeal View Grade: Grade IIb - only the arytenoids and epiglottis seen      Difficult Airway Encountered?: No      Complications:  None    Airway Device:  Oral endotracheal tube    Airway Device Size:  7.5    Style/Cuff Inflation:  Cuffed    Inflation Amount (mL):  4    Tube secured:  22    Secured at:  The lips    Placement Verified By:  Capnometry    Complicating Factors:  Small mouth    Findings Post-Intubation:  BS equal bilateral and atraumatic/condition of teeth unchanged

## 2022-05-09 NOTE — OP NOTE
[facility]  Colon and Rectal Surgery    [address]  [place], [place]  Tel: [phone]  Fax: [phone]  [email or web address]   MD Wesley Fink MD Brian Kann, MD W. Forrest Johnston, MD Matthew Giglia, MD Jennifer Paruch, MD William Kethman, MD Danielle Kay, MD     Patient name: Odette Hrat   YOB: 1955   MRN: [id number]  Date of surgery: 05/09/2022    Operative Report    Pre-operative diagnoses: Colovesical and colovaginal fistula, acalculous cholecystitis    Patient Active Problem List   Diagnosis    Ischemic cardiomyopathy    Automatic implantable cardioverter-defibrillator in situ    History of coronary artery stent placement    Elevated rheumatoid factor    RBBB with left anterior fascicular block    Acute diverticulitis    Coronary artery disease of native artery of native heart with stable angina pectoris    Familial hypercholesterolemia    Normocytic anemia    History of myocardial infarction    Primary hypertension    Former smoker    Chronic combined systolic and diastolic congestive heart failure    Colovesical fistula    Urinary tract infection with pyuria    Leukopenia    Severe malnutrition    At risk for surgical complication    Acute cystitis without hematuria    Left upper quadrant abdominal pain    Acute cholecystitis    Calculus of gallbladder with cholecystitis without biliary obstruction    Anemia     Post-operative diagnosis: Same as above    Procedure:  1. Open takedown of colovesical and colovaginal fistuli (two separate areas) with sigmoid colectomy, drainage of intra-abdominal abscess and end colostomy - omental pedicle flap placed between rectal stump and vagina  2. Colonoscopy with endoscopically guided partial cecectomy and appendectomy for appendiceal orifice polyp  3. Cystoscopy and bilateral ureteral catheter placement (Dr. Torres - Urology)  4. Cholecystectomy (Dr. Epstein -  General Surgery)    Findings:  1. Significant inflammation in the mid to distal sigmoid inflammation with two separate fistuli identified - left ureter identified and protected, stents intact bilaterally on completion  2. 1.5 cm sessile appendiceal orifice polyp - appendectomy and partial cecectomy performed and with 45 mm TA blue load stapler   3. Cholecystectomy performed by General Surgery        Surgeon: Rafa Cardozo MD  Assistant: Rafa Morin MD    Indication: Ms. Hart is a 66 year old woman with a history of CAD and PCI in 1/2022, FH (EF 20%), HTN, ICD, severe malnutrition, complicated diverticulitis (clinical evidence of colovaginal and colovesical fistuli), and recent acute cholecystitis who is scheduled to undergo a sigmoid colectomy, end colostomy, takedown of fistuli, and cholecystectomy. The benefits, risks, and alternatives were discussed with the patient, they were given the opportunity to ask questions and they elected to proceed with operative intervention after signing written consent.    Procedure:  After pre-operative assessment and review of informed consent, the patient was taken to the operating room and received general anesthesia. Pre-operative antibiotics were administered and the patient was placed in lithotomy position. The abdomen was prepped and draped in the usual sterile fashion and a timeout was performed according to [facility] Quality and Safety guidelines.      A midline laparotomy was performed and fascia incised sharply and extended inferiorly being careful not to injure the bladder. A wound protector was placed. We began by performed a lateral to medial mobilization of the sigmoid - this was performed bluntly due to severity of inflammation with care to prevent injury to bladder, ureters, uterus, fallopian tubes, ovary, and vagina. Two fistuli were encountered and an intervening abscess was drained. The fistuli were debrided and the bladder irrigated with 200 mL of  methylene blue stained saline - no leak was identified. We then performed a complete colonoscopy to ileocecal valve and appendiceal orifice.    A sessile 1.5 cm polyp was found adjacent to the appendiceal orifice. The appendix was then mobilized and appendiceal artery divided with Ligasure. An appendectomy and partial cecectomy was performed with endoscopic guidance to ensure the polyp was completely excised. This staple line was imbricated with interrupted with 3-0 Vicryl and buttressed with surrounding adipose tissue.     The rectosigmoid junction was then divided with a Contour green load 45 mm stapler. Two 2-0 Prolene sutures were placed at either end of the staple line. The intervening colonic mesentery from this junction to the more proximal descending colon was divided with Ligasure (mid-mesenteric). The descending colon was then mobilized laterally to ensure a tension free ostomy.     A Bookwalter retractor was then placed and retraction setup for our General Surgery colleagues. Dr. Epstein then performed a cholecystectomy which will be dictated separately.     The retractor was then removed and abdomen thoroughly irrigated. An omental flap was then harvested with blood supply derived from the left gastroepiploic artery - Hemoblast was placed in the pelvis and then a 15 Fr Chris drain and then the omental flap between vagina, rectum, and bladder.     A circular disc of skin was excised at the previously marked ostomy site. The subcutaneous tissue was spread down to the anterior fascia - this was incised and rectus spread. The posterior sheath was incised and the defect accommodated two fingerbreadths. The descending colon was brought through the defect and pericolic adipose tissue cleared.    Seprafilm was then placed throughout the abdomen and the midline laparotomy was approximated with two looped 1-PDS suture and subcutaneous tissue irrigated. The skin was approximated with 3-0 Vicryl interrupted sutures  and the ostomy was matured in standard fashion with 3-0 Vicryl sutures. An ostomy appliance and dry dressing was applied.    Prior to closure and after final closure instrument, needle, and sponge counts were correct.    The procedure was completed without complication and was well-tolerated. The bilateral ureteral stents were removed at the conclusion of the case (intact) and the quinones was exchanged. The patient was then brought to the post-anesthesia care unit in stable condition. I was present for the entire operation and discussed our findings with her family (Don) by phone.    Complications: None  Estimated blood loss: 150 mL  Disposition: PACU      Rafa Cardozo MD - Staff Surgeon  Department of Colon & Rectal Surgery  [facility]

## 2022-05-09 NOTE — PLAN OF CARE
POC reviewed with patient, verbalized understanding. AAOx4, VSS on 2LNC.     -ML incision with island dressing  -RS PRASAD drain  -LS colostomy  -Cardona cath in place, blood tinged output noted  -Pain managed with dilaudid PCA  -Tolerating diet well, no c/o nausea  -Bed in low and locked position, call light inreach      Problem: Adult Inpatient Plan of Care  Goal: Plan of Care Review  Outcome: Ongoing, Progressing  Goal: Patient-Specific Goal (Individualized)  Outcome: Ongoing, Progressing  Goal: Absence of Hospital-Acquired Illness or Injury  Outcome: Ongoing, Progressing  Goal: Optimal Comfort and Wellbeing  Outcome: Ongoing, Progressing     Problem: Infection  Goal: Absence of Infection Signs and Symptoms  Outcome: Ongoing, Progressing     Problem: Fall Injury Risk  Goal: Absence of Fall and Fall-Related Injury  Outcome: Ongoing, Progressing

## 2022-05-09 NOTE — TRANSFER OF CARE
"Anesthesia Transfer of Care Note    Patient: Odette Hart    Procedure(s) Performed: Procedure(s) (LRB):  COLECTOMY, SIGMOID (ERAS High, lithotomy) (N/A)  COLONOSCOPY (N/A)  CYSTOSCOPY,WITH URETERAL CATHETER INSERTION (Bilateral)  CHOLECYSTECTOMY (N/A)  APPENDECTOMY (N/A)  EXCISION, CECUM (N/A)  CREATION, COLOSTOMY    Patient location: PACU    Anesthesia Type: general    Transport from OR: Transported from OR on room air with adequate spontaneous ventilation    Post pain: adequate analgesia    Post assessment: no apparent anesthetic complications and tolerated procedure well    Post vital signs: stable    Level of consciousness: responds to stimulation    Nausea/Vomiting: no nausea/vomiting    Complications: none    Transfer of care protocol was followed      Last vitals:   Visit Vitals  BP (!) 125/58 (BP Location: Left arm, Patient Position: Lying)   Pulse 73   Temp 36.7 °C (98.1 °F) (Temporal)   Resp 18   Ht 5' 3" (1.6 m)   Wt 59.8 kg (131 lb 13.4 oz)   LMP  (LMP Unknown)   SpO2 95%   Breastfeeding No   BMI 23.35 kg/m²     "

## 2022-05-09 NOTE — PROGRESS NOTES
Pt has a [employer] Defibrillator.    Pt is going to Surgery for: Colectomy    Underlying rhythm:  NSR    No reprogramming to Base Rate or Mode required, currently programmed DDD .     ICD detection and tachy therapies disabled at bedside    Notified CRNA/Anesthesia that reprogramming was performed.     Post op: Please call [phone] or Cardiology Fellow On Call after hours to have initial settings restored if reprogramming was performed.

## 2022-05-09 NOTE — OP NOTE
[facility]  Operative Note    Date: 05/09/2022    Pre-Op Diagnosis:   1. Colovesical fistula  2. Colovaginal fistula  3. Diverticulitis  4. Acute cholecystitis    Patient Active Problem List    Diagnosis Date Noted    Anemia 05/07/2022    Left upper quadrant abdominal pain 05/06/2022    Acute cholecystitis 05/06/2022    Calculus of gallbladder with cholecystitis without biliary obstruction 05/06/2022    Acute cystitis without hematuria 04/26/2022    At risk for surgical complication 04/22/2022    Severe malnutrition 04/21/2022    Colovesical fistula 02/26/2022    Urinary tract infection with pyuria 02/26/2022    Leukopenia 02/26/2022    Chronic combined systolic and diastolic congestive heart failure 02/17/2022    History of myocardial infarction 01/24/2022    Primary hypertension 01/24/2022    Former smoker 01/24/2022    Normocytic anemia 01/23/2022    Familial hypercholesterolemia 01/22/2022    Coronary artery disease of native artery of native heart with stable angina pectoris 04/19/2021    Acute diverticulitis 01/12/2021    RBBB with left anterior fascicular block 04/15/2019    Elevated rheumatoid factor 03/07/2019    Ischemic cardiomyopathy 02/04/2019    Automatic implantable cardioverter-defibrillator in situ 02/04/2019    History of coronary artery stent placement 02/04/2019     Post-Op Diagnosis: same    Procedure(s) Performed:   1.  Cystoscopy with bilateral ureteral catheter placement    Specimen(s): none    Staff Surgeon: Guadalupe Torres MD    Assistant Surgeon: Braxton Soto MD    Anesthesia: General endotracheal anesthesia    Indications: Odette Hart is a 66 y.o. female with a history of diverticulitis with colovesical and colovaginal fistulas. She is undergoing sigmoid colectomy with colorectal surgery. In addition, she has acute cholecystitis and is undergoing concomitant cholecystectomy with general surgery. Dr. Cardozo has requested intra-operative ureteral catheters to  allow for early intra-operative identification and repair of any injuries.      Findings:   1. Feculent material within bladder lumen.  2. Approximately 2 cm area of bullous edema on upper left posterolateral wall with central punctum (approx 3 mm) consistent with location of colovesical fistula.    Estimated Blood Loss: min    Drains:   1.  Bilateral 5 Fr ureteral catheters  2.  16 Fr Cardona catheter    Procedure in Detail: Upon entering the room, the patient was under general anesthesia.  The patient was then placed in the dorsal lithotomy position and prepped and draped in the usual sterile fashion. Preoperative antibiotics were administered per the primary surgeon preference. In addition, vancomycin was given due to Enterococcus growing in her urine. Timeout was performed.      A 22 Fr cystoscope was inserted into the urethra and formal cystourethroscopy was performed. The urethra was normal. The right and left ureteral orifices were in the normal anatomic position. There was an approximately 2 cm area of bullous edema on the upper left posterolateral wall with a central punctum (approx 3 mm) consistent with location of colovesical fistula. There were no additional areas of the bladder wall concerning for fistulae. A 5 Fr open-ended ureteral catheter was inserted into the left ureteral orifice and advanced to the level of the left renal pelvis. The cystoscope was then removed leaving the ureteral catheter in place.     The cystoscope was then reinserted alongside the ureteral catheter and a 5 Fr open-ended ureteral catheter was inserted into the right ureteral orifice and advanced to the level of the right renal pelvis. The cystoscope was then removed.      A 16 Fr Cardona catheter was inserted and the balloon was filled with 10 mL of sterile water. The ureteral catheters were secured in the standard fashion. There were no complications with the procedure and the patient tolerated our procedure well.     The case  was then turned over to the primary surgeon.     Braxton Soto MD    I was present for the entire case and agree with the above note.

## 2022-05-09 NOTE — NURSING TRANSFER
Nursing Transfer Note      5/9/2022     Reason patient is being transferred: postop    Transfer To: 1001    Transfer via bed    Transfer with cardiac monitoring    Transported by pacu pct    Medicines sent: IVF, PCA dilaudid    Any special needs or follow-up needed: none    Chart send with patient: Yes    Notified: son via text    Patient reassessed at: 5/9/22    Upon arrival to floor:  Report given to Araceli Honeycutt

## 2022-05-09 NOTE — ANESTHESIA PROCEDURE NOTES
Arterial    Diagnosis: ischemic cardiomyopathy    Patient location during procedure: done in OR    Staffing  Authorizing Provider: Uriel Bland MD  Performing Provider: Uriel Bland MD    Anesthesiologist was present at the time of the procedure.    Preanesthetic Checklist  Completed: patient identified, IV checked, site marked, risks and benefits discussed, surgical consent, monitors and equipment checked, pre-op evaluation, timeout performed and anesthesia consent givenArterial  Skin Prep: chlorhexidine gluconate  Local Infiltration: none  Orientation: right  Location: radial    Catheter Size: 20 G  Catheter placement by Anatomical landmarks. Heme positive aspiration all ports. Insertion Attempts: 1  Assessment  Dressing: secured with tape and tegaderm  Patient: Tolerated well

## 2022-05-09 NOTE — INTERVAL H&P NOTE
The patient has been examined and the H&P has been reviewed:    I concur with the findings and no changes have occurred since H&P was written.    Surgery risks, benefits and alternative options discussed and understood by patient/family.          Active Hospital Problems    Diagnosis  POA    *Calculus of gallbladder with cholecystitis without biliary obstruction [K80.10]  Yes    Anemia [D64.9]  Yes    Left upper quadrant abdominal pain [R10.12]  Yes    Acute cholecystitis [K81.0]  Yes    Severe malnutrition [E43]  Yes    Colovesical fistula [N32.1]  Yes    Urinary tract infection with pyuria [N39.0]  Yes    Chronic combined systolic and diastolic congestive heart failure [I50.42]  Yes    Primary hypertension [I10]  Yes    Familial hypercholesterolemia [E78.01]  Yes    Coronary artery disease of native artery of native heart with stable angina pectoris [I25.118]  Yes     1/24/2022: [facility]: Cath: LAD: Proximal stent patent. LCX: Proximal 80%. LCX: Dominant. Moderate disease. LCX: HEVER 2.75 x 18 mm. Distal dissection. HEVER 2.75 x 22 mm.          Acute diverticulitis [K57.92]  Yes     2- CT   1. Sigmoid colon extensive diverticulosis with wall thickening and surrounding fat stranding suggestive of recurrent diverticulitis.  No free air or intraperitoneal fluid collection.  Given the chronicity of sigmoid colon wall thickening compared to the October 25, 2021 examination, consider sigmoidoscopy follow-up to exclude neoplasia.  2. No rectal contrast extravasation to suggest rectovaginal fistula, however there is persistent gas within the bladder, cul-de-sac, and left adnexae which could be seen in vesicovaginal fistula or colorectal fistula.  An inflamed diverticulum is contiguous with abnormal urinary bladder wall thickening superolaterally on the left.  CT findings are suspicious but not definitive or colovesical fistula.  Could be further evaluated with nonemergent fluoroscopic examination, as clinically  indicated.        Automatic implantable cardioverter-defibrillator in situ [Z95.810]  Yes      Resolved Hospital Problems   No resolved problems to display.

## 2022-05-09 NOTE — BRIEF OP NOTE
[facility] - Surgery  Brief Operative Note    SUMMARY     Surgery Date: 5/9/2022     Surgeon(s) and Role:  Panel 1:     * Rafa Cardozo MD - Primary     * Rafa Morin MD - Resident - Assisting  Panel 2:     * Guadalupe Torres MD - Primary     * Braxton Soto MD - Resident - Assisting  Panel 3:     * Doug Epstein MD - Primary     * Joseph Larson MD - Resident - Assisting        Pre-op Diagnosis:  Colovesical fistula [N32.1]  Colovaginal fistula [N82.4]    Post-op Diagnosis:  Post-Op Diagnosis Codes:     * Colovesical fistula [N32.1]     * Colovaginal fistula [N82.4]    Procedure(s) (LRB):  COLECTOMY, SIGMOID (ERAS High, lithotomy) (N/A)  COLONOSCOPY (N/A)  CYSTOSCOPY,WITH URETERAL CATHETER INSERTION (Bilateral)  CHOLECYSTECTOMY (N/A)  APPENDECTOMY (N/A)  EXCISION, CECUM (N/A)    Anesthesia: General    Operative Findings: left sided colovesical and colovaginal fistula.  Negative intraoperative leak test on bladder    Estimated Blood Loss: 150 mL    Estimated Blood Loss has been documented.         Specimens:   Specimen (24h ago, onward)             Start     Ordered    05/09/22 1135  Specimen to Pathology, Surgery Gastrointestinal tract  Once        Comments: Pre-op Diagnosis: Colovesical fistula [N32.1]Colovaginal fistula [N82.4]Procedure(s):COLECTOMY, SIGMOID (ERAS High, lithotomy)SIGMOIDOSCOPY, FLEXIBLECYSTOSCOPY, WITH URETERAL STENT INSERTIONCHOLECYSTECTOMYAPPENDECTOMY Number of specimens: 3Name of specimens: 1. Appendix - permanent2. Sigmoid Colon and Portion of Cecum - permanent3. Gallbladder - permanent     References:    Click here for ordering Quick Tip   Question Answer Comment   Procedure Type: Gastrointestinal tract    Which provider would you like to cc? RAFA CARDOZO    Release to patient Immediate        05/09/22 1135                [id number]

## 2022-05-09 NOTE — PLAN OF CARE
Called EP cardiology; pt has a [employer] pacemaker. Per Grady,Cardiology EP fellow on call; NO intervention needed due to procedure operating below the diaphragm.

## 2022-05-09 NOTE — CARE UPDATE
Has been in the OR all day.  Chart reviewed.  Growing VRE in urine.  ID consulted.  Postop management per surgical specialties    Linda Manning MD  [facility] Medicine

## 2022-05-09 NOTE — OP NOTE
[facility]  Surgery Department  Operative Note    SUMMARY     Patient: Odette Hart  Date: 5/9/2022  MRN: [id number]    Date of Procedure: 5/9/2022     Procedure:   CHOLECYSTECTOMY (N/A)     Surgeon(s) and Role:  Panel 1:     * Rafa Cardozo MD - Primary     * Rafa Morin MD - Resident - Assisting  Panel 2:     * Guadalupe Torres MD - Primary     * Braxton Soto MD - Resident - Assisting  Panel 3:     * Doug Epstein MD - Primary     * Joseph Larson MD - Resident - Assisting        Pre-Operative Diagnosis: Cholelithiasis    Post-Operative Diagnosis: Same    Anesthesia: General    Indications for Procedure:   Odette Hart is a 66 y.o. female with Hx of colovesicular fistula, CHF, CAD and chronic abd pain who was found to have thickened GB on imagining. We recommended cholecystectomy at time of surgery as she was going to colostomy, and she agreed to proceed.    Procedure in Detail:   After consent was obtained, the patient was taken to the operating room and general anesthesia was initiated successfully. The patient was positioned supine. Her abd was prepped and draped in the normal sterile fashion. Pre-operative antibiotics were administered. Time out was performed and all present were in agreement. Colorectal began the surgery via a midline incision, please see their Operative Note for details.    We were called in when they were ready. The gallbladder was dissected off the gallbladder fossa. The cystic duct was identified. It  Was tied off using 2-0 silks, and then transected. The cystic artery was encountered posteriorly, and was controlled with electrocautery. The gallbladder dissection was then completed and the specimen was passed off. The surgical field was hemostatic with no signs of biliary leakage at this time. We then handed care of the patient back to colorectal surgery to complete the case.    Drains:     Estimated Blood Loss (EBL): 20cc    Total IV  Fluids:     Complications: No    Specimens: Gallbladder  Specimen (24h ago, onward)                None            Condition: Stable    Disposition: Still in surgery, stable.    Attestation: Dr. Epstein was present and scrubbed for the entire procedure.      Joseph Larson MD  General Surgery Resident, PGY-5  Pager [phone]  5/9/2022 10:27 AM

## 2022-05-09 NOTE — PROGRESS NOTES
Pt out of the Surgery.    Cardiac device reprogramming  for AICD    No reprogramming to Base Rate or Mode required   ICD detection and tachy therapies enabled at bedside       Pt's nurse notified of changes.

## 2022-05-09 NOTE — PROGRESS NOTES
Pt remained stable during pacu stay. Pt AAOx4. Pt followering commands. VSS. Patient states pain is tolerable. PCA dilaudid in use. No N&V. Pt tolerated clears. Incision to abdomen intact with island dsg and scant drainage. PRASAD drain RLQ to bulb suction with bloody drainage. LLQ colostomy in place. No output yet. WBC was 1.49 MD notified no new orders. Also urine output was 75cc bloody drainage MD made aware. Ok with output for now because of pt CHF. Teds/SCD's in place throughout duration in PACU. Transferred to the next Phase of Care.

## 2022-05-10 PROBLEM — I50.9 ACUTE DECOMPENSATED HEART FAILURE: Status: ACTIVE | Noted: 2022-05-10

## 2022-05-10 LAB
ALBUMIN SERPL BCP-MCNC: 2.1 G/DL (ref 3.5–5.2)
ALP SERPL-CCNC: 73 U/L (ref 55–135)
ALT SERPL W/O P-5'-P-CCNC: 29 U/L (ref 10–44)
ANION GAP SERPL CALC-SCNC: 6 MMOL/L (ref 8–16)
AST SERPL-CCNC: 44 U/L (ref 10–40)
BASOPHILS # BLD AUTO: 0.02 K/UL (ref 0–0.2)
BASOPHILS NFR BLD: 0.6 % (ref 0–1.9)
BILIRUB SERPL-MCNC: 1.1 MG/DL (ref 0.1–1)
BUN SERPL-MCNC: 20 MG/DL (ref 8–23)
CALCIUM SERPL-MCNC: 8.6 MG/DL (ref 8.7–10.5)
CHLORIDE SERPL-SCNC: 100 MMOL/L (ref 95–110)
CO2 SERPL-SCNC: 32 MMOL/L (ref 23–29)
CREAT SERPL-MCNC: 0.7 MG/DL (ref 0.5–1.4)
DIFFERENTIAL METHOD: ABNORMAL
EOSINOPHIL # BLD AUTO: 0 K/UL (ref 0–0.5)
EOSINOPHIL NFR BLD: 0 % (ref 0–8)
ERYTHROCYTE [DISTWIDTH] IN BLOOD BY AUTOMATED COUNT: 23.5 % (ref 11.5–14.5)
EST. GFR  (AFRICAN AMERICAN): >60 ML/MIN/1.73 M^2
EST. GFR  (NON AFRICAN AMERICAN): >60 ML/MIN/1.73 M^2
GLUCOSE SERPL-MCNC: 134 MG/DL (ref 70–110)
HCT VFR BLD AUTO: 37.9 % (ref 37–48.5)
HGB BLD-MCNC: 11.6 G/DL (ref 12–16)
IMM GRANULOCYTES # BLD AUTO: 0 K/UL (ref 0–0.04)
IMM GRANULOCYTES NFR BLD AUTO: 0 % (ref 0–0.5)
LYMPHOCYTES # BLD AUTO: 2.2 K/UL (ref 1–4.8)
LYMPHOCYTES NFR BLD: 66.8 % (ref 18–48)
MCH RBC QN AUTO: 27.5 PG (ref 27–31)
MCHC RBC AUTO-ENTMCNC: 30.6 G/DL (ref 32–36)
MCV RBC AUTO: 90 FL (ref 82–98)
MONOCYTES # BLD AUTO: 1 K/UL (ref 0.3–1)
MONOCYTES NFR BLD: 28.4 % (ref 4–15)
NEUTROPHILS # BLD AUTO: 0.1 K/UL (ref 1.8–7.7)
NEUTROPHILS NFR BLD: 4.2 % (ref 38–73)
NRBC BLD-RTO: 0 /100 WBC
PLATELET # BLD AUTO: 222 K/UL (ref 150–450)
PMV BLD AUTO: 11.2 FL (ref 9.2–12.9)
POTASSIUM SERPL-SCNC: 4.8 MMOL/L (ref 3.5–5.1)
PROT SERPL-MCNC: 6.7 G/DL (ref 6–8.4)
RBC # BLD AUTO: 4.22 M/UL (ref 4–5.4)
SODIUM SERPL-SCNC: 138 MMOL/L (ref 136–145)
WBC # BLD AUTO: 3.34 K/UL (ref 3.9–12.7)

## 2022-05-10 PROCEDURE — 25000003 PHARM REV CODE 250: Performed by: SURGERY

## 2022-05-10 PROCEDURE — 99223 PR INITIAL HOSPITAL CARE,LEVL III: ICD-10-PCS | Mod: ,,, | Performed by: PHYSICIAN ASSISTANT

## 2022-05-10 PROCEDURE — 97116 GAIT TRAINING THERAPY: CPT

## 2022-05-10 PROCEDURE — 99233 SBSQ HOSP IP/OBS HIGH 50: CPT | Mod: GC,,, | Performed by: HOSPITALIST

## 2022-05-10 PROCEDURE — 97530 THERAPEUTIC ACTIVITIES: CPT

## 2022-05-10 PROCEDURE — 97161 PT EVAL LOW COMPLEX 20 MIN: CPT

## 2022-05-10 PROCEDURE — 25000003 PHARM REV CODE 250: Performed by: PHYSICIAN ASSISTANT

## 2022-05-10 PROCEDURE — 99223 1ST HOSP IP/OBS HIGH 75: CPT | Mod: ,,, | Performed by: PHYSICIAN ASSISTANT

## 2022-05-10 PROCEDURE — 99233 PR SUBSEQUENT HOSPITAL CARE,LEVL III: ICD-10-PCS | Mod: GC,,, | Performed by: HOSPITALIST

## 2022-05-10 PROCEDURE — 25000003 PHARM REV CODE 250: Performed by: STUDENT IN AN ORGANIZED HEALTH CARE EDUCATION/TRAINING PROGRAM

## 2022-05-10 PROCEDURE — 20600001 HC STEP DOWN PRIVATE ROOM

## 2022-05-10 PROCEDURE — 25000003 PHARM REV CODE 250: Performed by: HOSPITALIST

## 2022-05-10 PROCEDURE — 63600175 PHARM REV CODE 636 W HCPCS: Performed by: SURGERY

## 2022-05-10 PROCEDURE — 80053 COMPREHEN METABOLIC PANEL: CPT | Performed by: STUDENT IN AN ORGANIZED HEALTH CARE EDUCATION/TRAINING PROGRAM

## 2022-05-10 PROCEDURE — 63600175 PHARM REV CODE 636 W HCPCS: Performed by: PHYSICIAN ASSISTANT

## 2022-05-10 PROCEDURE — 25000003 PHARM REV CODE 250: Performed by: NURSE PRACTITIONER

## 2022-05-10 PROCEDURE — 97535 SELF CARE MNGMENT TRAINING: CPT

## 2022-05-10 PROCEDURE — 27000207 HC ISOLATION

## 2022-05-10 PROCEDURE — 85025 COMPLETE CBC W/AUTO DIFF WBC: CPT | Performed by: SURGERY

## 2022-05-10 PROCEDURE — 36415 COLL VENOUS BLD VENIPUNCTURE: CPT | Performed by: STUDENT IN AN ORGANIZED HEALTH CARE EDUCATION/TRAINING PROGRAM

## 2022-05-10 PROCEDURE — 63600175 PHARM REV CODE 636 W HCPCS: Performed by: HOSPITALIST

## 2022-05-10 PROCEDURE — 97166 OT EVAL MOD COMPLEX 45 MIN: CPT

## 2022-05-10 PROCEDURE — S0030 INJECTION, METRONIDAZOLE: HCPCS | Performed by: NURSE PRACTITIONER

## 2022-05-10 RX ORDER — OXYCODONE HYDROCHLORIDE 10 MG/1
10 TABLET ORAL EVERY 4 HOURS PRN
Status: DISCONTINUED | OUTPATIENT
Start: 2022-05-10 | End: 2022-01-01

## 2022-05-10 RX ORDER — HYDROMORPHONE HYDROCHLORIDE 1 MG/ML
0.2 INJECTION, SOLUTION INTRAMUSCULAR; INTRAVENOUS; SUBCUTANEOUS EVERY 6 HOURS PRN
Status: DISCONTINUED | OUTPATIENT
Start: 2022-05-10 | End: 2022-01-01

## 2022-05-10 RX ORDER — OXYCODONE HYDROCHLORIDE 5 MG/1
5 TABLET ORAL EVERY 4 HOURS PRN
Status: DISCONTINUED | OUTPATIENT
Start: 2022-05-10 | End: 2022-01-01

## 2022-05-10 RX ORDER — LINEZOLID 2 MG/ML
600 INJECTION, SOLUTION INTRAVENOUS
Status: DISCONTINUED | OUTPATIENT
Start: 2022-05-10 | End: 2022-01-01

## 2022-05-10 RX ORDER — METRONIDAZOLE 500 MG/100ML
500 INJECTION, SOLUTION INTRAVENOUS
Status: DISCONTINUED | OUTPATIENT
Start: 2022-05-10 | End: 2022-01-01

## 2022-05-10 RX ORDER — OXYCODONE HYDROCHLORIDE 10 MG/1
10 TABLET ORAL EVERY 6 HOURS PRN
Status: DISCONTINUED | OUTPATIENT
Start: 2022-05-10 | End: 2022-05-10

## 2022-05-10 RX ORDER — OXYCODONE HYDROCHLORIDE 5 MG/1
5 TABLET ORAL EVERY 6 HOURS PRN
Status: DISCONTINUED | OUTPATIENT
Start: 2022-05-10 | End: 2022-05-10

## 2022-05-10 RX ORDER — ONDANSETRON 2 MG/ML
8 INJECTION INTRAMUSCULAR; INTRAVENOUS EVERY 6 HOURS PRN
Status: DISCONTINUED | OUTPATIENT
Start: 2022-05-10 | End: 2022-01-01

## 2022-05-10 RX ORDER — FLUCONAZOLE 2 MG/ML
400 INJECTION, SOLUTION INTRAVENOUS
Status: DISCONTINUED | OUTPATIENT
Start: 2022-05-10 | End: 2022-01-01

## 2022-05-10 RX ADMIN — ONDANSETRON 4 MG: 2 INJECTION INTRAMUSCULAR; INTRAVENOUS at 08:05

## 2022-05-10 RX ADMIN — CEFEPIME HYDROCHLORIDE 2 G: 2 INJECTION, SOLUTION INTRAVENOUS at 05:05

## 2022-05-10 RX ADMIN — ACETAMINOPHEN 1000 MG: 10 INJECTION INTRAVENOUS at 05:05

## 2022-05-10 RX ADMIN — CEFEPIME HYDROCHLORIDE 2 G: 2 INJECTION, SOLUTION INTRAVENOUS at 02:05

## 2022-05-10 RX ADMIN — CARVEDILOL 12.5 MG: 12.5 TABLET, FILM COATED ORAL at 08:05

## 2022-05-10 RX ADMIN — SCOPALAMINE 1 PATCH: 1 PATCH, EXTENDED RELEASE TRANSDERMAL at 04:05

## 2022-05-10 RX ADMIN — METRONIDAZOLE 500 MG: 500 INJECTION, SOLUTION INTRAVENOUS at 11:05

## 2022-05-10 RX ADMIN — FUROSEMIDE 40 MG: 10 INJECTION, SOLUTION INTRAVENOUS at 08:05

## 2022-05-10 RX ADMIN — MUPIROCIN: 20 OINTMENT TOPICAL at 09:05

## 2022-05-10 RX ADMIN — FLUCONAZOLE IN SODIUM CHLORIDE 400 MG: 2 INJECTION, SOLUTION INTRAVENOUS at 05:05

## 2022-05-10 RX ADMIN — OXYCODONE 5 MG: 5 TABLET ORAL at 12:05

## 2022-05-10 RX ADMIN — ACETAMINOPHEN 1000 MG: 500 TABLET ORAL at 09:05

## 2022-05-10 RX ADMIN — FUROSEMIDE 40 MG: 10 INJECTION, SOLUTION INTRAVENOUS at 05:05

## 2022-05-10 RX ADMIN — Medication: at 03:05

## 2022-05-10 RX ADMIN — EZETIMIBE 10 MG: 10 TABLET ORAL at 08:05

## 2022-05-10 RX ADMIN — PANTOPRAZOLE SODIUM 40 MG: 40 TABLET, DELAYED RELEASE ORAL at 08:05

## 2022-05-10 RX ADMIN — MUPIROCIN: 20 OINTMENT TOPICAL at 08:05

## 2022-05-10 RX ADMIN — ONDANSETRON 4 MG: 2 INJECTION INTRAMUSCULAR; INTRAVENOUS at 05:05

## 2022-05-10 RX ADMIN — OXYCODONE HYDROCHLORIDE 10 MG: 10 TABLET ORAL at 08:05

## 2022-05-10 RX ADMIN — CEFEPIME HYDROCHLORIDE 2 G: 2 INJECTION, SOLUTION INTRAVENOUS at 09:05

## 2022-05-10 RX ADMIN — PROCHLORPERAZINE EDISYLATE 5 MG: 5 INJECTION INTRAMUSCULAR; INTRAVENOUS at 01:05

## 2022-05-10 RX ADMIN — LINEZOLID 600 MG: 600 INJECTION, SOLUTION INTRAVENOUS at 05:05

## 2022-05-10 RX ADMIN — ASPIRIN 81 MG: 81 TABLET, COATED ORAL at 08:05

## 2022-05-10 RX ADMIN — ONDANSETRON 4 MG: 2 INJECTION INTRAMUSCULAR; INTRAVENOUS at 12:05

## 2022-05-10 RX ADMIN — ACETAMINOPHEN 1000 MG: 10 INJECTION INTRAVENOUS at 02:05

## 2022-05-10 RX ADMIN — METRONIDAZOLE 500 MG: 500 INJECTION, SOLUTION INTRAVENOUS at 04:05

## 2022-05-10 NOTE — SUBJECTIVE & OBJECTIVE
Interval History: Feeling ok today. 2L O2 via NC. Some LI. Initiated Lasix 40 mg IV BID late yesterday. Monitor UOP and adjust as indicated, diurese to euvolemia and wean O2. MDR VRE in urine, ID consulted. F/u recs.    Review of Systems   Constitutional:  Negative for chills, fatigue and fever.   Respiratory:  Negative for cough and shortness of breath.    Cardiovascular:  Negative for chest pain, palpitations and leg swelling.   Gastrointestinal:  Positive for abdominal pain and nausea. Negative for diarrhea and vomiting.   Genitourinary:  Negative for dysuria and urgency.   Neurological:  Negative for dizziness and headaches.   All other systems reviewed and are negative.  Objective:     Vital Signs (Most Recent):  Temp: 97.8 °F (36.6 °C) (05/10/22 1120)  Pulse: 74 (05/10/22 1120)  Resp: 18 (05/10/22 1240)  BP: (!) 108/55 (05/10/22 1120)  SpO2: 96 % (05/10/22 1120)   Vital Signs (24h Range):  Temp:  [97 °F (36.1 °C)-97.8 °F (36.6 °C)] 97.8 °F (36.6 °C)  Pulse:  [60-78] 74  Resp:  [12-20] 18  SpO2:  [93 %-100 %] 96 %  BP: (108-132)/(52-65) 108/55     Weight: 59.8 kg (131 lb 13.4 oz)  Body mass index is 23.35 kg/m².    Intake/Output Summary (Last 24 hours) at 5/10/2022 1321  Last data filed at 5/10/2022 0509  Gross per 24 hour   Intake 100 ml   Output 455 ml   Net -355 ml        Physical Exam  Constitutional:       Appearance: Normal appearance. She is well-developed.   HENT:      Head: Normocephalic and atraumatic.   Cardiovascular:      Rate and Rhythm: Normal rate and regular rhythm.      Heart sounds: No murmur heard.  Pulmonary:      Effort: Pulmonary effort is normal. No respiratory distress.      Breath sounds: Normal breath sounds. No wheezing or rales.   Abdominal:      General: There is no distension.      Palpations: Abdomen is soft.      Tenderness: There is abdominal tenderness.   Musculoskeletal:         General: No deformity.      Comments: PICC in RUE   Skin:     General: Skin is warm.    Neurological:      Mental Status: She is alert.       Significant Labs: All pertinent labs within the past 24 hours have been reviewed.    Significant Imaging: I have reviewed all pertinent imaging results/findings within the past 24 hours.

## 2022-05-10 NOTE — PROGRESS NOTES
General Surgery Update    Patient seen and examined. Feels well this morning.   Incision without strikethrough. Ostomy is healthy. Drain is thin and nonbilious.    T bili stable at 1.1.    POD1 from open cholecystectomy during concurrent sigmoid colectomy. Doing well. General surgery will sign off at this time. Please call with any questions or concerns.    Alex Richetr MD  General Surgery PGY-2  05/10/2022

## 2022-05-10 NOTE — PROGRESS NOTES
[facility] Medicine  Progress Note    Patient Name: Odette Hart  MRN: [id number]  Patient Class: IP- Inpatient   Admission Date: 5/6/2022  Length of Stay: 1 days  Attending Physician: Rafa Cardozo MD  Primary Care Provider: Braxton Barragan MD        Subjective:     Principal Problem:Calculus of gallbladder with cholecystitis without biliary obstruction        HPI:  Odette Hart is a 66 y.o. 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 Patient was at [facility] and has been complaining of constant LUQ abdominal pain, poor PO intake, and N/V. She has been having abdominal pain, nausea/vomiting, and inability to tolerate PO since she was diagnosed with diverticulitis. They thought her symptoms were due to flagyl and it was discontinued with improvement, however her symptoms returned 10 days ago. She had a CT of her abdomen on 05/04 which showed improving diverticulitis, persistent colovesicular fistula and distended gallbladder with pericholecystic fluid. She denies any fevers, chills, chest pain, dysuria.    General surgery consulted in ED and recommend HIDA.        Overview/Hospital Course:  Ms. Hart was admitted to Hospital Medicine for management of acute cholecystitis.  Gen Surg was consulted and HIDA was ordered.  CRS was consulted who plan for corrective surgery of her colovesical fistula with ostomy placement on 5/9 with combined beatriz.      Interval History: Feeling ok today. 2L O2 via NC. Some LI. Initiated Lasix 40 mg IV BID late yesterday. Monitor UOP and adjust as indicated, diurese to euvolemia and wean O2. MDR VRE in urine, ID consulted. F/u recs.    Review of Systems   Constitutional:  Negative for chills, fatigue and fever.   Respiratory:  Negative for cough and shortness of breath.     Cardiovascular:  Negative for chest pain, palpitations and leg swelling.   Gastrointestinal:  Positive for abdominal pain and nausea. Negative for diarrhea and vomiting.   Genitourinary:  Negative for dysuria and urgency.   Neurological:  Negative for dizziness and headaches.   All other systems reviewed and are negative.  Objective:     Vital Signs (Most Recent):  Temp: 97.8 °F (36.6 °C) (05/10/22 1120)  Pulse: 74 (05/10/22 1120)  Resp: 18 (05/10/22 1240)  BP: (!) 108/55 (05/10/22 1120)  SpO2: 96 % (05/10/22 1120)   Vital Signs (24h Range):  Temp:  [97 °F (36.1 °C)-97.8 °F (36.6 °C)] 97.8 °F (36.6 °C)  Pulse:  [60-78] 74  Resp:  [12-20] 18  SpO2:  [93 %-100 %] 96 %  BP: (108-132)/(52-65) 108/55     Weight: 59.8 kg (131 lb 13.4 oz)  Body mass index is 23.35 kg/m².    Intake/Output Summary (Last 24 hours) at 5/10/2022 1321  Last data filed at 5/10/2022 0509  Gross per 24 hour   Intake 100 ml   Output 455 ml   Net -355 ml        Physical Exam  Constitutional:       Appearance: Normal appearance. She is well-developed.   HENT:      Head: Normocephalic and atraumatic.   Cardiovascular:      Rate and Rhythm: Normal rate and regular rhythm.      Heart sounds: No murmur heard.  Pulmonary:      Effort: Pulmonary effort is normal. No respiratory distress.      Breath sounds: Normal breath sounds. No wheezing or rales.   Abdominal:      General: There is no distension.      Palpations: Abdomen is soft.      Tenderness: There is abdominal tenderness.   Musculoskeletal:         General: No deformity.      Comments: PICC in RUE   Skin:     General: Skin is warm.   Neurological:      Mental Status: She is alert.       Significant Labs: All pertinent labs within the past 24 hours have been reviewed.    Significant Imaging: I have reviewed all pertinent imaging results/findings within the past 24 hours.      Assessment/Plan:      * Calculus of gallbladder with cholecystitis without biliary obstruction  · LUQ, N/V for past few  months, worsened over the last few days   · US with cholelithiasis, gallbladder wall thickening and pericholecystic fluid.  No sonographic Rasmussen's sign or wall hyperemia.    · HIDA ordered to determine if her cystic duct in patent  · S/p cholecystectomy with general surgery and ex-lap with ostomy creation with CRS on 5/9    Acute decompensated heart failure  · Combined HF with EF 20%  · Hypoxic requiring 2L on 5/7  · Radiographic evidence of pulmonary edema  · BNP ~ 3000  · Continue Coreg 12.5mg BID  · Transitioned to Lasix 40 mg IV BID on 5/9 (uses only prn at home). Monitor UOP and adjust as indicated to goal net negative 2-3 L /day  · Low sodium diet with 1.5 L fluid restriction  · Strict I&Os, daily weights      Anemia  · Stable.   · Blood smear notable for Rouleaux formation      Acute cholecystitis  · As above      Left upper quadrant abdominal pain  · As above      Severe malnutrition  · On TPN for chronic diverticulitis  · Nutrition consult    Urinary tract infection with pyuria  · In April - Pseudomonas / Enterobacteroid acute cystitis without hematuria  · Continue Cefepime and Flagyl  · Urine cx positive for MDR VRE. ID consulted. F/u recs    Colovesical fistula  · Continue Cefepime and Flagyl  S/p cholecystectomy with general surgery and ex-lap with ostomy creation with CRS on     Chronic combined systolic and diastolic congestive heart failure  See ADHF    Primary hypertension  · Chronic and stable  · Continue Coreg 12.5mg BID    Familial hypercholesterolemia  Continue Zetia  Might need Niacin  ? Not on statin, unclear why    Coronary artery disease of native artery of native heart with stable angina pectoris  · Chronic and stable  · Continue Aspirin 81mg PO daily  · Continue Coreg 12.5mg PO BID  · Prasugrel held on admission for surgery. Resume when safe from surgical standpoint and f/u her cardiologist outpatient    Acute diverticulitis  · Chronic issue  · Has been on TPN  · CRS following    Automatic  implantable cardioverter-defibrillator in situ  · Chronic and stable  · No acute issues      VTE Risk Mitigation (From admission, onward)         Ordered     enoxaparin injection 40 mg  Daily         05/06/22 2358     IP VTE HIGH RISK PATIENT  Once         05/06/22 2358     Place sequential compression device  Until discontinued         05/06/22 2358                  Analy Garrison DO  Department of Hospital Medicine   [facility] mattie Curry [facility]

## 2022-05-10 NOTE — ASSESSMENT & PLAN NOTE
Chronic, controlled.  Latest blood pressure and vitals reviewed-   Temp:  [97 °F (36.1 °C)-98.1 °F (36.7 °C)]   Pulse:  [60-78]   Resp:  [12-20]   BP: (109-132)/(52-65)   SpO2:  [93 %-100 %] .   Home meds for hypertension were reviewed and noted below.   Hypertension Medications             carvediloL (COREG) 12.5 MG tablet Take 1 tablet (12.5 mg total) by mouth 2 (two) times daily with meals.    nitroGLYCERIN (NITROSTAT) 0.4 MG SL tablet Place 1 tablet (0.4 mg total) under the tongue every 5 (five) minutes as needed for Chest pain.    spironolactone (ALDACTONE) 25 MG tablet Take 1 tablet (25 mg total) by mouth once daily.          While in the hospital, will manage blood pressure as follows; Continue home antihypertensive regimen    Will utilize p.r.n. blood pressure medication only if patient's blood pressure greater than  180/110 and she develops symptoms such as worsening chest pain or shortness of breath.

## 2022-05-10 NOTE — PT/OT/SLP EVAL
Physical Therapy Co-Evaluation and Co-Treatment with OT    Patient Name:  Odette Hart   MRN:  [id number]    Recent Surgery: Procedure(s) (LRB):  COLECTOMY, SIGMOID (ERAS High, lithotomy) (N/A)  COLONOSCOPY (N/A)  CYSTOSCOPY,WITH URETERAL CATHETER INSERTION (Bilateral)  CHOLECYSTECTOMY (N/A)  APPENDECTOMY (N/A)  EXCISION, CECUM (N/A)  CREATION, COLOSTOMY 1 Day Post-Op    *Co-treatment with OT due to suspected patient complexity and need for two skilled therapists to ensure safe mobilization.    Recommendations:     Discharge Recommendations:  home health PT   Discharge Equipment Recommendations: walker, rolling (TBD)   Barriers to discharge: Decreased caregiver support    Highest Level of Mobility: Gait 80'  Assistance Required: SBA w/ RW    Assessment:     Odette Hart is a 66 y.o. female admitted with a medical diagnosis of Calculus of gallbladder with cholecystitis without biliary obstruction. She presents with the following impairments/functional limitations:  weakness, impaired endurance, impaired self care skills, impaired functional mobilty, gait instability, pain, impaired cardiopulmonary response to activity, impaired balance    Pt met with HOB elevated and agreeable to PT session. Pt reports her PLOF is (I) with functional mobility and ADLs using no DME. She is currently limited by above listed deficits and now requires SBA for gait training with RW. Pt expresses dizziness at multiple points during session, especially while turning or transitioning from sitting to standing.    Pt would benefit from continued skilled acute PT 3x/wk to address above listed functional deficits, provide patient/caregiver education, reduce fall risk, and maximize (I) and safety with functional mobility. After hospital discharge, pt would benefit from HHPT to maximize rehab potential.      Rehab Prognosis: Good; patient would benefit from acute skilled PT services to address these deficits and reach maximum level of  "function.      Plan:     During this hospitalization, patient to be seen 3 x/week to address the identified rehab impairments via gait training, therapeutic activities, therapeutic exercises and progress toward the following goals:    · Plan of Care Expires:  06/10/22    This plan of care has been discussed with the patient/caregiver, who was included in its development and is in agreement with the identified goals and treatment plan.     Subjective     Communicated with RN prior to session.  Patient agreeable to participate.     Chief Complaint: Pain  Patient/Family Comments/goals: "I want to go dancing again"    Pain/Comfort:  Pain Rating 1: 7/10  Location - Orientation 1: generalized  Location 1: abdomen  Pain Addressed 1: Distraction, Reposition  Pain Rating Post-Intervention 1: 7/10    Patients cultural, spiritual, Alevism conflicts given the current situation: no    Patient's living environment is as follows:  Living Environment: Pt lives alone in mobile home with 4 SKYLER, B HRs.  Prior Level of Function: independent with mobility and ADLs  DME used: none  DME owned (not currently used): none  Upon discharge, patient will have assistance from: Pt reports she will have very little assist at d/c. Her children work during the day    Objective:     Patient found HOB elevated with telemetry, peripheral IV, PICC line, PRASAD drain, quinones catheter  upon PT entry to room.    General Precautions: Standard, fall   Orthopedic Precautions:N/A   Braces: N/A   BP (!) 111/52 (BP Location: Left arm, Patient Position: Lying)   Pulse 71   Temp 97.5 °F (36.4 °C) (Oral)   Resp 18   Ht 5' 3" (1.6 m)   Wt 59.8 kg (131 lb 13.4 oz)   LMP  (LMP Unknown)   SpO2 95%   Breastfeeding No   BMI 23.35 kg/m²   Oxygen Device: room air      Exams:     Cognition:  o Patient is oriented to Person, Place, Time, Situation, follows commands 100% of the time      Edema: None present     Postural examination/scapula alignment: Rounded " shoulder and Head forward     Lower Extremity Range of Motion:  o Right Lower Extremity: WNL  o Left Lower Extremity: WNL     Lower Extremity Strength:      Iliopsoas Quadriceps Knee  Flexion (HS) Tibialis  anterior Gastro- cnemius EHL   R LE NT (abdominal pain) 4/5 4/5 4/5 4/5 NT   L LE NT (abdominal pain) 4/5 4/5 4/5 4/5 NT       Sensation:   o Light touch sensation: Intact BLEs    Functional Mobility:    Bed Mobility:  · Supine to Sit: Minimal Assistance on L side of bed  · Via logroll   · Dizziness reported when sitting EOB  · Scooting anteriorly to EOB to plant feet on floor: Stand-by Assistance    Transfers:   · Sit to Stand Transfer: Stand-by Assistance  from EOB with RW AD   · Bed to Chair: Stand-by Assistance with RW AD via stand step t/f             Gait:  · Patient received gait training 80 feet with Stand-by Assistance and rolling walker  · Gait Assessment: occasional unsteady gait, decreased step length, flexed posture and decreased jan  · Gait Pattern Observed: Step-through reciprocal gait  · Comments: All lines remained intact throughout ambulation trial, mask donned for out of room ambulation. PT provided multiple cues for forward gaze and proper AD management. Pt reports dizziness during turns    Stair Navigation:    Deferred 2/2 pt's pain level and reported dizziness    Balance:  · Static Sit:   · Supervision at EOB  · Normal: Patient able to maintain steady balance without handhold support.  · Static Stand:   · Stand-By Assist with Rolling walker  · Good: Patient able to maintain balance without handhold support, limited postural sway  · Dynamic Stand:  · Stand-By Assist with Rolling walker        Therapeutic Activities/Exercises      Patient assisted with functional mobility as noted above   Patient ambulated to restroom and completed standing ADLs   Discussed the need to practice stair navigation next session as pt has 4 SKYLER her home   Patient educated on the importance of early  mobility to prevent functional decline during hospital stay   Patient was instructed to utilize staff assistance for mobility/transfers.  o Patient is appropriate to transfer with SBA w/ RW and RN/PCT assist  o Patient instructed to ambulate at least 3x daily with hospital staff   Patient educated on PT POC and role of PT in acute care   White board updated to include patient's safest level of mobility with staff assistance, RN also updated    AM-PAC 6 CLICK MOBILITY  Turning over in bed (including adjusting bedclothes, sheets and blankets)?: 3  Sitting down on and standing up from a chair with arms (e.g., wheelchair, bedside commode, etc.): 3  Moving from lying on back to sitting on the side of the bed?: 3  Moving to and from a bed to a chair (including a wheelchair)?: 3  Need to walk in hospital room?: 3  Climbing 3-5 steps with a railing?: 3  Basic Mobility Total Score: 18      Patient left up in chair with all lines intact, call button in reach and RN notified. Patient requested to apply 1L of O2. RN notified      History/Goals:     PAST MEDICAL HISTORY:  Past Medical History:   Diagnosis Date    Acute coronary syndrome     Allergy     Arthritis     Cardiomyopathy     CHF (congestive heart failure)     Coronary artery disease     Coronary artery disease of native artery of native heart with stable angina pectoris 4/19/2021 1/24/2022: [facility]: Cath: LAD: Proximal stent patent. LCX: Proximal 80%. LCX: Dominant. Moderate disease. LCX: HEVER 2.75 x 18 mm. Distal dissection. HEVER 2.75 x 22 mm.     Diverticulitis     Diverticulosis     Familial hypercholesterolemia 1/22/2022    Former smoker 1/24/2022    Heart attack     Heart disease     History of myocardial infarction 1/24/2022    Hyperlipidemia     Hypertension     Hypertension 1/24/2022    ICD (implantable cardioverter-defibrillator) in place     Non-ST elevation myocardial infarction (NSTEMI) 2/4/2019       Past Surgical History:   Procedure  Laterality Date    APPENDECTOMY N/A 2022    Procedure: APPENDECTOMY;  Surgeon: Rafa Cardozo MD;  Location: [facility]LR;  Service: Colon and Rectal;  Laterality: N/A;    ATRIAL CARDIAC PACEMAKER INSERTION      CECECTOMY N/A 2022    Procedure: EXCISION, CECUM;  Surgeon: Rafa Cardozo MD;  Location: [facility];  Service: Colon and Rectal;  Laterality: N/A;     SECTION      CHOLECYSTECTOMY N/A 2022    Procedure: CHOLECYSTECTOMY;  Surgeon: Doug Epstein MD;  Location: [facility];  Service: General;  Laterality: N/A;    COLONOSCOPY N/A 2022    Procedure: COLONOSCOPY;  Surgeon: Rafa Cardozo MD;  [facility]: [facility];  Service: Colon and Rectal;  Laterality: N/A;    COLOSTOMY  2022    Procedure: CREATION, COLOSTOMY;  Surgeon: Rafa Cardozo MD;  Location: [facility] OR 2[facility] FLR;  Service: Colon and Rectal;;    CORONARY STENT PLACEMENT      LEFT HEART CATHETERIZATION Left 2022    Procedure: CATHETERIZATION, HEART, LEFT;  Surgeon: Yohannes Cordova MD;  Location: [facility] CATH LAB;  Service: Cardiology;  Laterality: Left;       GOALS:   Multidisciplinary Problems     Physical Therapy Goals        Problem: Physical Therapy    Goal Priority Disciplines Outcome Goal Variances Interventions   Physical Therapy Goal     PT, PT/OT Ongoing, Progressing     Description: Goals to be met by: 22     Patient will increase functional independence with mobility by performin. Supine to sit with Modified [place]  2. Sit to supine with Modified [place]  3. Sit to stand transfer with Supervision  4. Bed to chair transfer with Supervision using LRAD  5. Gait  x 150 feet with Supervision using LRAD.   6. Ascend/descend 4 stair with bilateral Handrails and Supervision using LRAD.   7. Lower extremity exercise program x15 reps per handout, with supervision                     Time Tracking:     PT Received On: 05/10/22  PT Start Time: 923     PT Stop  Time: 0956  PT Total Time (min): 33 min     Billable Minutes: Evaluation 10, Gait Training 15 and Therapeutic Activity 8      Marion Arriaga, PT  05/10/2022  Pager# [phone]

## 2022-05-10 NOTE — PLAN OF CARE
Problem: Occupational Therapy  Goal: Occupational Therapy Goal  Description: Goals to be met by: 5/24/22     Patient will increase functional independence with ADLs by performing:    Feeding with [employer].  UE Dressing with [employer].  LE Dressing with Stand-by Assistance.  Grooming while standing at sink with Supervision.  Toileting from toilet with Supervision for hygiene and clothing management.   Toilet transfer to toilet with Supervision.  Pt will engage in functional mobility to simulate household distances in order to maximize functional activity tolerance required for engagement in occupations of choice with supervision using LRAD.   5/10/2022 1013 by Brigid Tamez OT  Outcome: Ongoing, Progressing     Evaluation complete-see note for details. Goals and POC established.    Brigid Tamez OTR/L  5/10/2022   Pager #: [phone]

## 2022-05-10 NOTE — ASSESSMENT & PLAN NOTE
OR 5/9 sigmoid colectomy, end colostomy, takedown of fistuli, and cholecystectomy    -await return of bowel function, lfd  -continue multi modality for pain control, dc pca and begin oral jpain meds  -encourage ambulation and use of IS  -phani hose and SCD  -prophalactic lovenox and PPI  -quinones in place

## 2022-05-10 NOTE — PLAN OF CARE
POC established and functional mobility goals were created to help pt return to PLOF. Will be reassessed as appropriate to measure pt progress.    Problem: Physical Therapy  Goal: Physical Therapy Goal  Description: Goals to be met by: 22     Patient will increase functional independence with mobility by performin. Supine to sit with Modified [place]  2. Sit to supine with Modified [place]  3. Sit to stand transfer with Supervision  4. Bed to chair transfer with Supervision using LRAD  5. Gait  x 150 feet with Supervision using LRAD.   6. Ascend/descend 4 stair with bilateral Handrails and Supervision using LRAD.   7. Lower extremity exercise program x15 reps per handout, with supervision    Outcome: Ongoing, Progressing

## 2022-05-10 NOTE — ASSESSMENT & PLAN NOTE
· In April - Pseudomonas / Enterobacteroid acute cystitis without hematuria  · Continue Cefepime and Flagyl  · Urine cx positive for MDR VRE. ID consulted. F/u recs

## 2022-05-10 NOTE — PLAN OF CARE
Pt is A&O x4. VS remained stable throughout the shift. Pt is on a clear liquid diet and tolerating well. Pain controlled with dilaudid PCA. NSR on the monitor. Adequate red urine on the quinones. Pt resting comfortably, bed in low position, call light within reach, WCTM.    Problem: Adult Inpatient Plan of Care  Goal: Plan of Care Review  Outcome: Ongoing, Progressing     Problem: Adult Inpatient Plan of Care  Goal: Patient-Specific Goal (Individualized)  Outcome: Ongoing, Progressing     Problem: Adult Inpatient Plan of Care  Goal: Absence of Hospital-Acquired Illness or Injury  Outcome: Ongoing, Progressing     Problem: Adult Inpatient Plan of Care  Goal: Optimal Comfort and Wellbeing  Outcome: Ongoing, Progressing     Problem: Adult Inpatient Plan of Care  Goal: Readiness for Transition of Care  Outcome: Ongoing, Progressing     Problem: Infection  Goal: Absence of Infection Signs and Symptoms  Outcome: Ongoing, Progressing

## 2022-05-10 NOTE — SUBJECTIVE & OBJECTIVE
Subjective:     Interval History: no acute events overnite, no n/v, adequate pain control with pca, denies flatus     Post-Op Info:  Procedure(s) (LRB):  COLECTOMY, SIGMOID (ERAS High, lithotomy) (N/A)  COLONOSCOPY (N/A)  CYSTOSCOPY,WITH URETERAL CATHETER INSERTION (Bilateral)  CHOLECYSTECTOMY (N/A)  APPENDECTOMY (N/A)  EXCISION, CECUM (N/A)  CREATION, COLOSTOMY   1 Day Post-Op      Medications:  Continuous Infusions:  Scheduled Meds:   acetaminophen  1,000 mg Intravenous Q8H    Followed by    acetaminophen  1,000 mg Oral Q8H    aspirin  81 mg Oral Daily    carvediloL  12.5 mg Oral BID WM    ceFEPime (MAXIPIME) IVPB  2 g Intravenous Q8H    enoxaparin  40 mg Subcutaneous Daily    ezetimibe  10 mg Oral QHS    furosemide (LASIX) injection  40 mg Intravenous BID    metroNIDAZOLE  500 mg Oral Q8H    mupirocin   Nasal BID    ondansetron  4 mg Intravenous QID    pantoprazole  40 mg Oral Daily    scopolamine  1 patch Transdermal Q3 Days     PRN Meds:   albuterol-ipratropium    ALPRAZolam    aluminum-magnesium hydroxide-simethicone    dextrose 10%    dextrose 10%    glucagon (human recombinant)    glucose    glucose    insulin aspart U-100    ondansetron    oxyCODONE    oxyCODONE    sodium chloride 0.9%    sodium chloride 0.9%        Objective:     Vital Signs (Most Recent):  Temp: 97.5 °F (36.4 °C) (05/10/22 0820)  Pulse: 71 (05/10/22 0820)  Resp: 18 (05/10/22 0820)  BP: (!) 111/52 (05/10/22 0820)  SpO2: 95 % (05/10/22 0820)   Vital Signs (24h Range):  Temp:  [97 °F (36.1 °C)-98.1 °F (36.7 °C)] 97.5 °F (36.4 °C)  Pulse:  [60-78] 71  Resp:  [12-20] 18  SpO2:  [93 %-100 %] 95 %  BP: (109-132)/(52-65) 111/52     Intake/Output - Last 3 Shifts         05/08 0700  05/09 0659 05/09 0700  05/10 0659 05/10 0700  05/11 0659    P.O.       IV Piggyback  1591.7     Total Intake(mL/kg)  1591.7 (26.6)     Urine (mL/kg/hr)  665 (0.5)     Drains  90     Stool  0     Blood  150     Total Output  905     Net  +686.7            Urine  Occurrence 2 x  1 x    Stool Occurrence  0 x 0 x            Physical Exam  Vitals and nursing note reviewed.   Constitutional:       Appearance: She is well-developed. She is not ill-appearing.   HENT:      Head: Normocephalic.   Eyes:      Pupils: Pupils are equal, round, and reactive to light.   Cardiovascular:      Rate and Rhythm: Normal rate and regular rhythm.      Heart sounds: Normal heart sounds.   Pulmonary:      Effort: Pulmonary effort is normal. No respiratory distress.      Breath sounds: Normal breath sounds. No wheezing or rales.   Abdominal:      Palpations: Abdomen is soft. There is no mass.      Tenderness: There is no guarding or rebound.      Comments: Abd inc line healin gwell  Ileostomy with no output but stoma pink and viable  PRASAD serous drainage    Skin:     General: Skin is warm and dry.   Neurological:      Mental Status: She is alert and oriented to person, place, and time.   Psychiatric:         Behavior: Behavior normal.         Thought Content: Thought content normal.         Judgment: Judgment normal.           Significant Labs:  BMP (Last 3 Results):   Recent Labs   Lab 05/07/22  0608 05/08/22  0530 05/09/22  0416 05/09/22  1214 05/10/22  0508   GLU 75 99 83 138* 134*    133* 140 138 138   K 3.9 3.7 3.5 3.1* 4.8   CL 99 94* 97 102 100   CO2 31* 30* 35* 27 32*   BUN 21 20 12 13 20   CREATININE 0.6 0.7 0.6 0.6 0.7   CALCIUM 8.6* 8.4* 8.5* 7.8* 8.6*   MG 1.7 1.6 1.7  --   --      CBC (Last 3 Results):   Recent Labs   Lab 05/09/22  0416 05/09/22  1214 05/10/22  0508   WBC 2.98* 1.49* 3.34*   RBC 4.17 3.99* 4.22   HGB 11.5* 11.0* 11.6*   HCT 36.5* 35.4* 37.9    263 222   MCV 88 89 90   MCH 27.6 27.6 27.5   MCHC 31.5* 31.1* 30.6*       Significant Diagnostics:  None

## 2022-05-10 NOTE — PLAN OF CARE
POC reviewed with Pt, verbalized understanding. AAOx4. VSS on RA. Pt tolerating small amounts of clear liquid diet. Pt did not want to eat her low fiber/low residue diet. Endorses nausea, managed with scheduled meds. Voiding per quinones, adequate UOP.  Up in chair most of day. Midline incision with island dressing, small amount of drainage marked. RLQ PRASAD drain intact. LLQ colostomy intact, no output. Pain managed with PRN meds. Bed in lowest position, call light within reach. WCTM.

## 2022-05-10 NOTE — ASSESSMENT & PLAN NOTE
· Continue Cefepime and Flagyl  S/p cholecystectomy with general surgery and ex-lap with ostomy creation with CRS on

## 2022-05-10 NOTE — PROGRESS NOTES
[facility]  Colorectal Surgery  Progress Note    Patient Name: Odette Hart  MRN: [id number]  Admission Date: 5/6/2022  Hospital Length of Stay: 1 days  Attending Physician: Rafa Cardozo MD    Subjective:     Interval History: no acute events overnite, no n/v, adequate pain control with pca, denies flatus     Post-Op Info:  Procedure(s) (LRB):  COLECTOMY, SIGMOID (ERAS High, lithotomy) (N/A)  COLONOSCOPY (N/A)  CYSTOSCOPY,WITH URETERAL CATHETER INSERTION (Bilateral)  CHOLECYSTECTOMY (N/A)  APPENDECTOMY (N/A)  EXCISION, CECUM (N/A)  CREATION, COLOSTOMY   1 Day Post-Op      Medications:  Continuous Infusions:  Scheduled Meds:   acetaminophen  1,000 mg Intravenous Q8H    Followed by    acetaminophen  1,000 mg Oral Q8H    aspirin  81 mg Oral Daily    carvediloL  12.5 mg Oral BID WM    ceFEPime (MAXIPIME) IVPB  2 g Intravenous Q8H    enoxaparin  40 mg Subcutaneous Daily    ezetimibe  10 mg Oral QHS    furosemide (LASIX) injection  40 mg Intravenous BID    metroNIDAZOLE  500 mg Oral Q8H    mupirocin   Nasal BID    ondansetron  4 mg Intravenous QID    pantoprazole  40 mg Oral Daily    scopolamine  1 patch Transdermal Q3 Days     PRN Meds:   albuterol-ipratropium    ALPRAZolam    aluminum-magnesium hydroxide-simethicone    dextrose 10%    dextrose 10%    glucagon (human recombinant)    glucose    glucose    insulin aspart U-100    ondansetron    oxyCODONE    oxyCODONE    sodium chloride 0.9%    sodium chloride 0.9%        Objective:     Vital Signs (Most Recent):  Temp: 97.5 °F (36.4 °C) (05/10/22 0820)  Pulse: 71 (05/10/22 0820)  Resp: 18 (05/10/22 0820)  BP: (!) 111/52 (05/10/22 0820)  SpO2: 95 % (05/10/22 0820)   Vital Signs (24h Range):  Temp:  [97 °F (36.1 °C)-98.1 °F (36.7 °C)] 97.5 °F (36.4 °C)  Pulse:  [60-78] 71  Resp:  [12-20] 18  SpO2:  [93 %-100 %] 95 %  BP: (109-132)/(52-65) 111/52     Intake/Output - Last 3 Shifts         05/08 0700 05/09 0659 05/09 0700  05/10 0659  05/10 0700  05/11 0659    P.O.       IV Piggyback  1591.7     Total Intake(mL/kg)  1591.7 (26.6)     Urine (mL/kg/hr)  665 (0.5)     Drains  90     Stool  0     Blood  150     Total Output  905     Net  +686.7            Urine Occurrence 2 x  1 x    Stool Occurrence  0 x 0 x            Physical Exam  Vitals and nursing note reviewed.   Constitutional:       Appearance: She is well-developed. She is not ill-appearing.   HENT:      Head: Normocephalic.   Eyes:      Pupils: Pupils are equal, round, and reactive to light.   Cardiovascular:      Rate and Rhythm: Normal rate and regular rhythm.      Heart sounds: Normal heart sounds.   Pulmonary:      Effort: Pulmonary effort is normal. No respiratory distress.      Breath sounds: Normal breath sounds. No wheezing or rales.   Abdominal:      Palpations: Abdomen is soft. There is no mass.      Tenderness: There is no guarding or rebound.      Comments: Abd inc line healin gwell  Ileostomy with no output but stoma pink and viable  PRASAD serous drainage    Skin:     General: Skin is warm and dry.   Neurological:      Mental Status: She is alert and oriented to person, place, and time.   Psychiatric:         Behavior: Behavior normal.         Thought Content: Thought content normal.         Judgment: Judgment normal.           Significant Labs:  BMP (Last 3 Results):   Recent Labs   Lab 05/07/22  0608 05/08/22  0530 05/09/22  0416 05/09/22  1214 05/10/22  0508   GLU 75 99 83 138* 134*    133* 140 138 138   K 3.9 3.7 3.5 3.1* 4.8   CL 99 94* 97 102 100   CO2 31* 30* 35* 27 32*   BUN 21 20 12 13 20   CREATININE 0.6 0.7 0.6 0.6 0.7   CALCIUM 8.6* 8.4* 8.5* 7.8* 8.6*   MG 1.7 1.6 1.7  --   --      CBC (Last 3 Results):   Recent Labs   Lab 05/09/22  0416 05/09/22  1214 05/10/22  0508   WBC 2.98* 1.49* 3.34*   RBC 4.17 3.99* 4.22   HGB 11.5* 11.0* 11.6*   HCT 36.5* 35.4* 37.9    263 222   MCV 88 89 90   MCH 27.6 27.6 27.5   MCHC 31.5* 31.1* 30.6*       Significant  Diagnostics:  None    Assessment/Plan:     Acute cholecystitis  OR 5/9    Severe malnutrition  Nutrition consulted. Most recent weight and BMI monitored-     Malnutrition Type and Energy Intake  Malnutrition Type: chronic illness  Energy Intake: moderate energy intake    Malnutrition (Moderate to Severe)  Energy Intake (Malnutrition): less than 75% for greater than or equal to 1 month              Measurements:  Wt Readings from Last 1 Encounters:   05/09/22 59.8 kg (131 lb 13.4 oz)   Body mass index is 23.35 kg/m².    Recommendations: Recommendation/Intervention: 1.  Goals: Pt to meet >75% EEN/EPN by RD follow up    Patient has been screened and assessed by RD. RD will follow patient.      Urinary tract infection with pyuria  Consult to ID for hx of UTI with VRE, continuing antibiotics until seen by ID    Colovesical fistula  Repaired during surgery 5/9  Cardona in place    Chronic combined systolic and diastolic congestive heart failure  Patient is identified as having Combined Systolic and Diastolic heart failure that is Chronic. CHF is currently controlled. Latest ECHO performed and demonstrates- Results for orders placed during the hospital encounter of 01/22/22    Echo Saline Bubble? No    Interpretation Summary  · The left ventricle is severely enlarged with mild eccentric hypertrophy and severely decreased systolic function.  · There are segmental left ventricular wall motion abnormalities. The mid anteroseptal and anterior walls are akinetic. The apex is akinetic. The remainder of the left ventricular walls are severely hypokinetic.  · The estimated ejection fraction is 20%.  · Spontaneous echocardiographic contrast in the left ventricle.  · Grade III left ventricular diastolic dysfunction.  · Severe left atrial enlargement.  · Normal right ventricular size with normal right ventricular systolic function.  · Mild right atrial enlargement.  · Mild aortic regurgitation.  · Mild-to-moderate mitral  regurgitation.  · Mild tricuspid regurgitation.  · The estimated PA systolic pressure is 28 mmHg.  · Intermediate central venous pressure (8 mmHg).  · Pacemaker/ICD lead.  . Continue home meds and monitor clinical status closely. Monitor on telemetry.   Recent Labs   Lab 05/09/22  1214   BNP 2,792*   .      Primary hypertension  Chronic, controlled.  Latest blood pressure and vitals reviewed-   Temp:  [97 °F (36.1 °C)-98.1 °F (36.7 °C)]   Pulse:  [60-78]   Resp:  [12-20]   BP: (109-132)/(52-65)   SpO2:  [93 %-100 %] .   Home meds for hypertension were reviewed and noted below.   Hypertension Medications             carvediloL (COREG) 12.5 MG tablet Take 1 tablet (12.5 mg total) by mouth 2 (two) times daily with meals.    nitroGLYCERIN (NITROSTAT) 0.4 MG SL tablet Place 1 tablet (0.4 mg total) under the tongue every 5 (five) minutes as needed for Chest pain.    spironolactone (ALDACTONE) 25 MG tablet Take 1 tablet (25 mg total) by mouth once daily.          While in the hospital, will manage blood pressure as follows; Continue home antihypertensive regimen    Will utilize p.r.n. blood pressure medication only if patient's blood pressure greater than  180/110 and she develops symptoms such as worsening chest pain or shortness of breath.    Coronary artery disease of native artery of native heart with stable angina pectoris  Patient with known CAD s/p stent placement and CABG, which is controlled   Tele  Resume home meds    Acute diverticulitis  OR 5/9 sigmoid colectomy, end colostomy, takedown of fistuli, and cholecystectomy    -await return of bowel function, lfd  -continue multi modality for pain control, dc pca and begin oral jpain meds  -encourage ambulation and use of IS  -phani hose and SCD  -prophalactic lovenox and PPI  -quinones in place    Automatic implantable cardioverter-defibrillator in situ  Cont tele  Cont home meds  Monitor vs          Carola Buckley NP  Colorectal Surgery  [facility]

## 2022-05-10 NOTE — ASSESSMENT & PLAN NOTE
Patient is identified as having Combined Systolic and Diastolic heart failure that is Chronic. CHF is currently controlled. Latest ECHO performed and demonstrates- Results for orders placed during the hospital encounter of 01/22/22    Echo Saline Bubble? No    Interpretation Summary  · The left ventricle is severely enlarged with mild eccentric hypertrophy and severely decreased systolic function.  · There are segmental left ventricular wall motion abnormalities. The mid anteroseptal and anterior walls are akinetic. The apex is akinetic. The remainder of the left ventricular walls are severely hypokinetic.  · The estimated ejection fraction is 20%.  · Spontaneous echocardiographic contrast in the left ventricle.  · Grade III left ventricular diastolic dysfunction.  · Severe left atrial enlargement.  · Normal right ventricular size with normal right ventricular systolic function.  · Mild right atrial enlargement.  · Mild aortic regurgitation.  · Mild-to-moderate mitral regurgitation.  · Mild tricuspid regurgitation.  · The estimated PA systolic pressure is 28 mmHg.  · Intermediate central venous pressure (8 mmHg).  · Pacemaker/ICD lead.  . Continue home meds and monitor clinical status closely. Monitor on telemetry.   Recent Labs   Lab 05/09/22  1214   BNP 2,792*   .

## 2022-05-10 NOTE — ASSESSMENT & PLAN NOTE
· Combined HF with EF 20%  · Hypoxic requiring 2L on 5/7  · Radiographic evidence of pulmonary edema  · BNP ~ 3000  · Continue Coreg 12.5mg BID  · Transitioned to Lasix 40 mg IV BID on 5/9 (uses only prn at home). Monitor UOP and adjust as indicated to goal net negative 2-3 L /day  · Low sodium diet with 1.5 L fluid restriction  · Strict I&Os, daily weights

## 2022-05-10 NOTE — PT/OT/SLP EVAL
Occupational Therapy   Co-Evaluation/Treatment with PT    Name: Odette Hart  MRN: [id number]  Admitting Diagnosis:  Calculus of gallbladder with cholecystitis without biliary obstruction  Recent Surgery: Procedure(s) (LRB):  COLECTOMY, SIGMOID (ERAS High, lithotomy) (N/A)  COLONOSCOPY (N/A)  CYSTOSCOPY,WITH URETERAL CATHETER INSERTION (Bilateral)  CHOLECYSTECTOMY (N/A)  APPENDECTOMY (N/A)  EXCISION, CECUM (N/A)  CREATION, COLOSTOMY 1 Day Post-Op    Recommendations:     Discharge Recommendations: home health PT, home health OT  Discharge Equipment Recommendations:  walker, rolling  Barriers to discharge:  None    Assessment:     Odette Hart is a 66 y.o. female with a medical diagnosis of Calculus of gallbladder with cholecystitis without biliary obstruction.  She presents with good motivation and participation. Pt tolerated session well, limited by dizziness with transitional movements and with turns. Pt with mild confusion this date. She is currently requiring increased assistance with functional tasks. Performance deficits affecting function: weakness, impaired endurance, impaired self care skills, impaired functional mobilty, gait instability, impaired balance, pain, impaired cardiopulmonary response to activity.  Pt would benefit from skilled OT services in order to maximize independence with ADLs and facilitate safe discharge. Pt would benefit from home health OT once medically stable for discharge.     Rehab Prognosis: Good; patient would benefit from acute skilled OT services to address these deficits and reach maximum level of function.       Plan:     Patient to be seen 3 x/week to address the above listed problems via self-care/home management, therapeutic activities, therapeutic exercises, neuromuscular re-education  · Plan of Care Expires: 06/09/22  · Plan of Care Reviewed with: patient    Subjective     Chief Complaint: dizziness with turns and transitional movements  Patient/Family  Comments/goals: to return home    Occupational Profile:  Living Environment: Pt lives alone in a mobile home with 4 SKYLER and R HR  Previous level of function: independent with ADLs and mobility  Roles and Routines: retired, drives  Equipment Used at Home:  none  Assistance upon Discharge: Upon discharge, pt will have limited assistance from family    Pain/Comfort:  · Pain Rating 1: 7/10  · Location - Orientation 1: generalized  · Location 1: abdomen  · Pain Addressed 1: Distraction, Reposition  · Pain Rating Post-Intervention 1: 7/10    Patients cultural, spiritual, Worship conflicts given the current situation: no    Objective:     Communicated with: RN and PT prior to session.  Patient found HOB elevated with telemetry, peripheral IV, PICC line upon OT entry to room.    General Precautions: Standard, fall   Orthopedic Precautions:N/A   Braces: N/A  Respiratory Status: Room air    Occupational Performance:    Bed Mobility:    · Patient completed Supine to Sit with minimum assistance   · Patient completed scooting anterior towards EOB with minimum assistance   · Pt with c/o of dizziness following supine>sit     Functional Mobility/Transfers:  · Patient completed Sit <> Stand Transfer with stand by assistance  with  rolling walker   · Functional Mobility: Pt ambulated bed>bathroom and short distance in johnson with CGA-SBA and RW in order to maximize functional activity tolerance and standing balance required for engagement in occupations of choice.    · Cuing for RW management and overall safety  · Pt with c/o of dizziness during turns and mild instability noted    Activities of Daily Living:  · Grooming: stand by assistance to perform oral care and faical hygiene standing at sink  · Upper Body Dressing: minimum assistance to don posterior gown simulating jacket seated EOB  · Lower Body Dressing: maximal assistance to doff/don  socks seated EOB; pt unable to bend in forward flexion or achieve figure 4 position 2'  abdominal pain    Cognitive/Visual Perceptual:  Cognitive/Psychosocial Skills:     -       Oriented to: Person, Place, Time, Situation and demonstrated moments of confusion   -       Follows Commands/attention:Follows multistep  commands  -       Communication: clear/fluent  -       Memory: No Deficits noted  -       Safety awareness/insight to disability: intact   -       Mood/Affect/Coping skills/emotional control: Appropriate to situation    Physical Exam:  Sensation:    -       Intact  Upper Extremity Range of Motion:     -       Right Upper Extremity: WFL  -       Left Upper Extremity: WFL  Upper Extremity Strength:    -       Right Upper Extremity: WFL  -       Left Upper Extremity: WFL   Strength:    -       Right Upper Extremity: WFL  -       Left Upper Extremity: WFL  Fine Motor Coordination:    -       Intact    AMPAC 6 Click ADL:  AMPAC Total Score: 15    Treatment & Education:  -Therapist provided facilitation and instruction of proper body mechanics, energy conservation, and fall prevention strategies during tasks listed above.  -Pt educated on role of OT, POC and goals for therapy  -Pt educated on importance of OOB activities with staff member assistance and sitting OOB majority of the day.   -Pt verbalized understanding. Pt expressed no further concerns/questions  -Whiteboard updated  -Evaluation completed with PT to best establish plan of care for acute setting.   -Pt requesting oxygen to be placed at conclusion of session; RN notified  Education:    Patient left up in chair with all lines intact, call button in reach and RN notified    GOALS:   Multidisciplinary Problems     Occupational Therapy Goals        Problem: Occupational Therapy    Goal Priority Disciplines Outcome Interventions   Occupational Therapy Goal     OT, PT/OT Ongoing, Progressing    Description: Goals to be met by: 5/24/22     Patient will increase functional independence with ADLs by performing:    Feeding with  [place].  UE Dressing with [place].  LE Dressing with Stand-by Assistance.  Grooming while standing at sink with Supervision.  Toileting from toilet with Supervision for hygiene and clothing management.   Toilet transfer to toilet with Supervision.  Pt will engage in functional mobility to simulate household distances in order to maximize functional activity tolerance required for engagement in occupations of choice with supervision using LRAD.                    History:     Past Medical History:   Diagnosis Date    Acute coronary syndrome     Allergy     Arthritis     Cardiomyopathy     CHF (congestive heart failure)     Coronary artery disease     Coronary artery disease of native artery of native heart with stable angina pectoris 2021: [facility]: Cath: LAD: Proximal stent patent. LCX: Proximal 80%. LCX: Dominant. Moderate disease. LCX: HEVER 2.75 x 18 mm. Distal dissection. HEVER 2.75 x 22 mm.     Diverticulitis     Diverticulosis     Familial hypercholesterolemia 2022    Former smoker 2022    Heart attack     Heart disease     History of myocardial infarction 2022    Hyperlipidemia     Hypertension     Hypertension 2022    ICD (implantable cardioverter-defibrillator) in place     Non-ST elevation myocardial infarction (NSTEMI) 2019       Past Surgical History:   Procedure Laterality Date    APPENDECTOMY N/A 2022    Procedure: APPENDECTOMY;  Surgeon: Rafa Cardozo MD;  Location: [facility] OR [address] [place] [address];  Service: Colon and Rectal;  Laterality: N/A;    ATRIAL CARDIAC PACEMAKER INSERTION      CECECTOMY N/A 2022    Procedure: EXCISION, CECUM;  Surgeon: Rafa Cardozo MD;  [facility];  Service: Colon and Rectal;  Laterality: N/A;     SECTION      CHOLECYSTECTOMY N/A 2022    Procedure: CHOLECYSTECTOMY;  Surgeon: Doug Epstein MD;  [facility];  Service: General;  Laterality: N/A;    COLONOSCOPY  N/A 5/9/2022    Procedure: COLONOSCOPY;  Surgeon: Rafa Cardozo MD;  [facility];  Service: Colon and Rectal;  Laterality: N/A;    COLOSTOMY  5/9/2022    Procedure: CREATION, COLOSTOMY;  Surgeon: Rafa Cardozo MD;  [facility];  Service: Colon and Rectal;;    CORONARY STENT PLACEMENT      LEFT HEART CATHETERIZATION Left 1/24/2022    Procedure: CATHETERIZATION, HEART, LEFT;  Surgeon: Yohannes Cordova MD;  Location: [facility] CATH LAB;  Service: Cardiology;  Laterality: Left;       Time Tracking:     OT Date of Treatment: 05/10/22  OT Start Time: 0923  OT Stop Time: 0956  OT Total Time (min): 33 min    Billable Minutes:Evaluation 10  Self Care/Home Management 13  Therapeutic Activity 10    5/10/2022

## 2022-05-10 NOTE — ASSESSMENT & PLAN NOTE
Nutrition consulted. Most recent weight and BMI monitored-     Malnutrition Type and Energy Intake  Malnutrition Type: chronic illness  Energy Intake: moderate energy intake    Malnutrition (Moderate to Severe)  Energy Intake (Malnutrition): less than 75% for greater than or equal to 1 month              Measurements:  Wt Readings from Last 1 Encounters:   05/09/22 59.8 kg (131 lb 13.4 oz)   Body mass index is 23.35 kg/m².    Recommendations: Recommendation/Intervention: 1.  Goals: Pt to meet >75% EEN/EPN by RD follow up    Patient has been screened and assessed by RD. RD will follow patient.

## 2022-05-10 NOTE — ANESTHESIA POSTPROCEDURE EVALUATION
Anesthesia Post Evaluation    Patient: Odette Hart    Procedure(s) Performed: Procedure(s) (LRB):  COLECTOMY, SIGMOID (ERAS High, lithotomy) (N/A)  COLONOSCOPY (N/A)  CYSTOSCOPY,WITH URETERAL CATHETER INSERTION (Bilateral)  CHOLECYSTECTOMY (N/A)  APPENDECTOMY (N/A)  EXCISION, CECUM (N/A)  CREATION, COLOSTOMY    Final Anesthesia Type: general      Patient location during evaluation: floor  Patient participation: Yes- Able to Participate  Level of consciousness: awake and alert  Post-procedure vital signs: reviewed and stable  Pain management: adequate  Airway patency: patent    PONV status at discharge: No PONV  Anesthetic complications: no      Cardiovascular status: hemodynamically stable  Respiratory status: unassisted  Follow-up not needed.          Vitals Value Taken Time   /52 05/10/22 0820   Temp 36.4 °C (97.5 °F) 05/10/22 0820   Pulse 71 05/10/22 0820   Resp 18 05/10/22 0820   SpO2 95 % 05/10/22 0820         Event Time   Out of Recovery 05/09/2022 13:15:00         Pain/Gal Score: Pain Rating Prior to Med Admin: 6 (5/10/2022  7:18 AM)  Pain Rating Post Med Admin: 5 (5/10/2022  5:35 AM)  Gal Score: 8 (5/9/2022  3:15 PM)

## 2022-05-10 NOTE — PLAN OF CARE
[facility]  Initial Discharge Assessment       Primary Care Provider: Braxton Barragan MD    Admission Diagnosis: Chest pain [R07.9]  Calculus of gallbladder with cholecystitis without biliary obstruction, unspecified cholecystitis acuity [K80.10]    Admission Date: 5/6/2022  Expected Discharge Date: 5/13/2022    Discharge Barriers Identified: None    Payor: [employer] / Plan: [id number] / Product Type: Medicare Advantage /     Extended Emergency Contact Information  Primary Emergency Contact: Abelardo Ortiz  Mobile Phone: [phone]  Relation: Son  Secondary Emergency Contact: Suri Ortiz  Mobile Phone: [phone]  Relation: Daughter    Discharge Plan A: Home Health  Discharge Plan B: Other (TBD)      [facility] [address] [facility] [address] [place] - [address]  [address]  [place] [address]  Phone: [phone] Fax: [phone]    [facility]  [address]  [facility] [address]  Phone: [phone] Fax: [phone]      Initial Assessment (most recent)     Adult Discharge Assessment - 05/10/22 1146        Discharge Assessment    Assessment Type Discharge Planning Assessment     Confirmed/corrected address, phone number and insurance Yes     Confirmed Demographics Correct on Facesheet     Source of Information patient     Communicated SIVAN with patient/caregiver Yes     Lives With alone     Do you have help at home or someone to help you manage your care at home? No     Prior to hospitilization cognitive status: Alert/Oriented     Current cognitive status: Alert/Oriented     Walking or Climbing Stairs Difficulty none     Dressing/Bathing Difficulty none     Equipment Currently Used at Home none     Readmission within 30 days? Yes     Do you currently have service(s) that help you manage your care at home? No     Do you take prescription medications? Yes     Do you have prescription coverage? Yes     Do you have any  problems affording any of your prescribed medications? No     Is the patient taking medications as prescribed? yes     Who is going to help you get home at discharge? Patient may have a friend provide transport but she is not sure     Are you on dialysis? No     Do you take coumadin? No     Discharge Plan A Home Health     Discharge Plan B Other   TBD    DME Needed Upon Discharge  other (see comments)   TBD    Discharge Plan discussed with: Patient     Discharge Barriers Identified None                  Nahomy Jalloh RN, CM   Ext: [phone]

## 2022-05-10 NOTE — ASSESSMENT & PLAN NOTE
· Chronic and stable  · Continue Aspirin 81mg PO daily  · Continue Coreg 12.5mg PO BID  · Prasugrel held on admission for surgery. Resume when safe from surgical standpoint and f/u her cardiologist outpatient

## 2022-05-11 LAB
ANION GAP SERPL CALC-SCNC: 9 MMOL/L (ref 8–16)
BUN SERPL-MCNC: 18 MG/DL (ref 8–23)
CALCIUM SERPL-MCNC: 8.7 MG/DL (ref 8.7–10.5)
CHLORIDE SERPL-SCNC: 91 MMOL/L (ref 95–110)
CO2 SERPL-SCNC: 31 MMOL/L (ref 23–29)
CREAT SERPL-MCNC: 0.7 MG/DL (ref 0.5–1.4)
EST. GFR  (AFRICAN AMERICAN): >60 ML/MIN/1.73 M^2
EST. GFR  (NON AFRICAN AMERICAN): >60 ML/MIN/1.73 M^2
GLUCOSE SERPL-MCNC: 81 MG/DL (ref 70–110)
POTASSIUM SERPL-SCNC: 3.2 MMOL/L (ref 3.5–5.1)
SODIUM SERPL-SCNC: 131 MMOL/L (ref 136–145)

## 2022-05-11 PROCEDURE — 25000003 PHARM REV CODE 250: Performed by: PHYSICIAN ASSISTANT

## 2022-05-11 PROCEDURE — 27000221 HC OXYGEN, UP TO 24 HOURS

## 2022-05-11 PROCEDURE — 93010 EKG 12-LEAD: ICD-10-PCS | Mod: ,,, | Performed by: INTERNAL MEDICINE

## 2022-05-11 PROCEDURE — 99900035 HC TECH TIME PER 15 MIN (STAT)

## 2022-05-11 PROCEDURE — 63600175 PHARM REV CODE 636 W HCPCS: Performed by: HOSPITALIST

## 2022-05-11 PROCEDURE — 93010 ELECTROCARDIOGRAM REPORT: CPT | Mod: ,,, | Performed by: INTERNAL MEDICINE

## 2022-05-11 PROCEDURE — 25000003 PHARM REV CODE 250: Performed by: NURSE PRACTITIONER

## 2022-05-11 PROCEDURE — 93005 ELECTROCARDIOGRAM TRACING: CPT

## 2022-05-11 PROCEDURE — 80048 BASIC METABOLIC PNL TOTAL CA: CPT | Performed by: SURGERY

## 2022-05-11 PROCEDURE — 25000003 PHARM REV CODE 250: Performed by: HOSPITALIST

## 2022-05-11 PROCEDURE — 63600175 PHARM REV CODE 636 W HCPCS: Performed by: PHYSICIAN ASSISTANT

## 2022-05-11 PROCEDURE — 25000003 PHARM REV CODE 250: Performed by: SURGERY

## 2022-05-11 PROCEDURE — 25000003 PHARM REV CODE 250: Performed by: STUDENT IN AN ORGANIZED HEALTH CARE EDUCATION/TRAINING PROGRAM

## 2022-05-11 PROCEDURE — 94799 UNLISTED PULMONARY SVC/PX: CPT

## 2022-05-11 PROCEDURE — 99233 PR SUBSEQUENT HOSPITAL CARE,LEVL III: ICD-10-PCS | Mod: GC,,, | Performed by: HOSPITALIST

## 2022-05-11 PROCEDURE — 94761 N-INVAS EAR/PLS OXIMETRY MLT: CPT

## 2022-05-11 PROCEDURE — 99233 SBSQ HOSP IP/OBS HIGH 50: CPT | Mod: GC,,, | Performed by: HOSPITALIST

## 2022-05-11 PROCEDURE — 27000207 HC ISOLATION

## 2022-05-11 PROCEDURE — 99233 SBSQ HOSP IP/OBS HIGH 50: CPT | Mod: ,,, | Performed by: PHYSICIAN ASSISTANT

## 2022-05-11 PROCEDURE — 99233 PR SUBSEQUENT HOSPITAL CARE,LEVL III: ICD-10-PCS | Mod: ,,, | Performed by: PHYSICIAN ASSISTANT

## 2022-05-11 PROCEDURE — 97530 THERAPEUTIC ACTIVITIES: CPT

## 2022-05-11 PROCEDURE — 36415 COLL VENOUS BLD VENIPUNCTURE: CPT | Performed by: SURGERY

## 2022-05-11 PROCEDURE — 97535 SELF CARE MNGMENT TRAINING: CPT

## 2022-05-11 PROCEDURE — 20600001 HC STEP DOWN PRIVATE ROOM

## 2022-05-11 PROCEDURE — S0030 INJECTION, METRONIDAZOLE: HCPCS | Performed by: NURSE PRACTITIONER

## 2022-05-11 PROCEDURE — 63600175 PHARM REV CODE 636 W HCPCS: Performed by: NURSE PRACTITIONER

## 2022-05-11 RX ORDER — POTASSIUM CHLORIDE 20 MEQ/1
40 TABLET, EXTENDED RELEASE ORAL EVERY 4 HOURS
Status: CANCELLED | OUTPATIENT
Start: 2022-05-11 | End: 2022-05-11

## 2022-05-11 RX ORDER — POTASSIUM CHLORIDE 20 MEQ/1
40 TABLET, EXTENDED RELEASE ORAL ONCE
Status: COMPLETED | OUTPATIENT
Start: 2022-05-11 | End: 2022-05-11

## 2022-05-11 RX ADMIN — MUPIROCIN: 20 OINTMENT TOPICAL at 09:05

## 2022-05-11 RX ADMIN — OXYCODONE HYDROCHLORIDE 10 MG: 10 TABLET ORAL at 03:05

## 2022-05-11 RX ADMIN — FUROSEMIDE 40 MG: 10 INJECTION, SOLUTION INTRAVENOUS at 08:05

## 2022-05-11 RX ADMIN — LINEZOLID 600 MG: 600 INJECTION, SOLUTION INTRAVENOUS at 04:05

## 2022-05-11 RX ADMIN — CEFEPIME HYDROCHLORIDE 2 G: 2 INJECTION, SOLUTION INTRAVENOUS at 10:05

## 2022-05-11 RX ADMIN — ONDANSETRON 4 MG: 2 INJECTION INTRAMUSCULAR; INTRAVENOUS at 09:05

## 2022-05-11 RX ADMIN — ONDANSETRON 8 MG: 2 INJECTION INTRAMUSCULAR; INTRAVENOUS at 04:05

## 2022-05-11 RX ADMIN — CEFEPIME HYDROCHLORIDE 2 G: 2 INJECTION, SOLUTION INTRAVENOUS at 05:05

## 2022-05-11 RX ADMIN — CEFEPIME HYDROCHLORIDE 2 G: 2 INJECTION, SOLUTION INTRAVENOUS at 03:05

## 2022-05-11 RX ADMIN — METRONIDAZOLE 500 MG: 500 INJECTION, SOLUTION INTRAVENOUS at 05:05

## 2022-05-11 RX ADMIN — MUPIROCIN: 20 OINTMENT TOPICAL at 08:05

## 2022-05-11 RX ADMIN — OXYCODONE HYDROCHLORIDE 10 MG: 10 TABLET ORAL at 04:05

## 2022-05-11 RX ADMIN — ONDANSETRON 4 MG: 2 INJECTION INTRAMUSCULAR; INTRAVENOUS at 04:05

## 2022-05-11 RX ADMIN — FLUCONAZOLE IN SODIUM CHLORIDE 400 MG: 2 INJECTION, SOLUTION INTRAVENOUS at 06:05

## 2022-05-11 RX ADMIN — EZETIMIBE 10 MG: 10 TABLET ORAL at 09:05

## 2022-05-11 RX ADMIN — POTASSIUM CHLORIDE 40 MEQ: 1500 TABLET, EXTENDED RELEASE ORAL at 12:05

## 2022-05-11 RX ADMIN — ONDANSETRON 4 MG: 2 INJECTION INTRAMUSCULAR; INTRAVENOUS at 08:05

## 2022-05-11 RX ADMIN — PANTOPRAZOLE SODIUM 40 MG: 40 TABLET, DELAYED RELEASE ORAL at 08:05

## 2022-05-11 RX ADMIN — METRONIDAZOLE 500 MG: 500 INJECTION, SOLUTION INTRAVENOUS at 08:05

## 2022-05-11 RX ADMIN — ASPIRIN 81 MG: 81 TABLET, COATED ORAL at 08:05

## 2022-05-11 RX ADMIN — ONDANSETRON 4 MG: 2 INJECTION INTRAMUSCULAR; INTRAVENOUS at 12:05

## 2022-05-11 NOTE — ASSESSMENT & PLAN NOTE
· In April - Pseudomonas / Enterobacteroid acute cystitis without hematuria  · Urine cx positive for MDR VRE. ID following, continue  Cefepime, linezolid, and Flagyl

## 2022-05-11 NOTE — ASSESSMENT & PLAN NOTE
OR 5/9 sigmoid colectomy, end colostomy, takedown of fistuli, and cholecystectomy    -await return of bowel function, lfd  -continue multi modality for pain control, dc pca and begin oral jpain meds  -encourage ambulation and use of IS  -phani hose and SCD  -prophalactic lovenox and PPI  -quinones in place for 8-10 days

## 2022-05-11 NOTE — ASSESSMENT & PLAN NOTE
· Combined HF with EF 20%  · Hypoxic requiring 2L on 5/7  · Radiographic evidence of pulmonary edema  · BNP ~ 3000  · Continue Coreg 12.5mg BID  · Transitioned to Lasix 40 mg IV BID on 5/9 (uses only prn at home).  · Na trended down, symptoms and oxygen requirements improved. Hold further diuresis for now and will reassess daily   · Low sodium diet with 1.5 L fluid restriction  · Strict I&Os, daily weights

## 2022-05-11 NOTE — PROGRESS NOTES
[facility]  Progress Note    Patient Name: Odette Hart  MRN: [id number]  Patient Class: IP- Inpatient   Admission Date: 5/6/2022  Length of Stay: 2 days  Attending Physician: Rafa Cardozo MD  Primary Care Provider: Braxton Barragan MD        Subjective:     Principal Problem:Calculus of gallbladder with cholecystitis without biliary obstruction        HPI:  Odette Hart is a 66 y.o. 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 Patient was at [facility] and has been complaining of constant LUQ abdominal pain, poor PO intake, and N/V. She has been having abdominal pain, nausea/vomiting, and inability to tolerate PO since she was diagnosed with diverticulitis. They thought her symptoms were due to flagyl and it was discontinued with improvement, however her symptoms returned 10 days ago. She had a CT of her abdomen on 05/04 which showed improving diverticulitis, persistent colovesicular fistula and distended gallbladder with pericholecystic fluid. She denies any fevers, chills, chest pain, dysuria.    General surgery consulted in ED and recommend HIDA.        Overview/Hospital Course:  Ms. Hart was admitted to Hospital Medicine for management of acute cholecystitis.  Gen Surg was consulted and HIDA was ordered.  CRS was consulted who plan for corrective surgery of her colovesical fistula with ostomy placement on 5/9 with combined beatriz. Transferred to CRS service,  consulted for continued care in setting of ADHF. Started on diuresis with lasix 40mg IV BID, symptoms and oxygen requirements now much improved. Will hold further diuresis for now       Interval History: POD2. reports she is tired from minimal sleep while inpatient. Expected post op pain. Orthopnea improved and now on 0.5L O2. Na  trended down this AM. Holding further diuresis     Review of Systems   Constitutional:  Negative for chills, fatigue and fever.   Respiratory:  Negative for cough and shortness of breath.    Cardiovascular:  Negative for chest pain, palpitations and leg swelling.   Gastrointestinal:  Positive for abdominal pain and nausea. Negative for diarrhea and vomiting.   Genitourinary:  Negative for dysuria and urgency.   Neurological:  Negative for dizziness and headaches.   All other systems reviewed and are negative.  Objective:     Vital Signs (Most Recent):  Temp: 98.6 °F (37 °C) (05/11/22 1145)  Pulse: 88 (05/11/22 1215)  Resp: 18 (05/11/22 1215)  BP: (!) 105/56 (05/11/22 1145)  SpO2: 96 % (05/11/22 1215)   Vital Signs (24h Range):  Temp:  [98 °F (36.7 °C)-98.7 °F (37.1 °C)] 98.6 °F (37 °C)  Pulse:  [67-91] 88  Resp:  [16-20] 18  SpO2:  [93 %-97 %] 96 %  BP: ()/(44-58) 105/56     Weight: 59.8 kg (131 lb 13.4 oz)  Body mass index is 23.35 kg/m².    Intake/Output Summary (Last 24 hours) at 5/11/2022 1530  Last data filed at 5/11/2022 1506  Gross per 24 hour   Intake 230 ml   Output 4240 ml   Net -4010 ml      Physical Exam  Constitutional:       Appearance: Normal appearance. She is well-developed.   HENT:      Head: Normocephalic and atraumatic.      Mouth/Throat:      Mouth: Mucous membranes are moist.   Eyes:      Conjunctiva/sclera: Conjunctivae normal.   Neck:      Comments: No JVD  Cardiovascular:      Rate and Rhythm: Normal rate and regular rhythm.      Heart sounds: No murmur heard.  Pulmonary:      Effort: Pulmonary effort is normal. No respiratory distress.      Breath sounds: Normal breath sounds. No wheezing or rales.      Comments: On 0.5L NC  Abdominal:      General: There is no distension.      Palpations: Abdomen is soft.      Tenderness: There is abdominal tenderness.      Comments: Colostomy bag with minimal stool and gas, incision site c/d/i   Musculoskeletal:      Right lower leg: No edema.       Left lower leg: No edema.      Comments: PICC in RURADHA   Skin:     General: Skin is warm.   Neurological:      General: No focal deficit present.      Mental Status: She is alert and oriented to person, place, and time.   Psychiatric:      Comments: Appears tired       Significant Labs: All pertinent labs within the past 24 hours have been reviewed.  CBC:   Recent Labs   Lab 05/10/22  0508   WBC 3.34*   HGB 11.6*   HCT 37.9        CMP:   Recent Labs   Lab 05/10/22  0508 05/11/22  0420    131*   K 4.8 3.2*    91*   CO2 32* 31*   * 81   BUN 20 18   CREATININE 0.7 0.7   CALCIUM 8.6* 8.7   PROT 6.7  --    ALBUMIN 2.1*  --    BILITOT 1.1*  --    ALKPHOS 73  --    AST 44*  --    ALT 29  --    ANIONGAP 6* 9   EGFRNONAA >60.0 >60.0       Significant Imaging: I have reviewed all pertinent imaging results/findings within the past 24 hours.      Assessment/Plan:      * Calculus of gallbladder with cholecystitis without biliary obstruction  Colovesical fistula  Acute diverticulitis  · LUQ, N/V for past few months, worsened over the last few days   · US with cholelithiasis, gallbladder wall thickening and pericholecystic fluid.  No sonographic Rasmussen's sign or wall hyperemia.    · HIDA ordered to determine if her cystic duct in patent  · S/p cholecystectomy with general surgery and ex-lap with ostomy creation with CRS on 5/9  · Continue Cefepime and Flagyl    Acute decompensated heart failure  · Combined HF with EF 20%  · Hypoxic requiring 2L on 5/7  · Radiographic evidence of pulmonary edema  · BNP ~ 3000  · Continue Coreg 12.5mg BID  · Transitioned to Lasix 40 mg IV BID on 5/9 (uses only prn at home).  · Na trended down, symptoms and oxygen requirements improved. Hold further diuresis for now and will reassess daily   · Low sodium diet with 1.5 L fluid restriction  · Strict I&Os, daily weights      Urinary tract infection with pyuria  · In April - Pseudomonas / Enterobacteroid acute cystitis without  hematuria  · Urine cx positive for MDR VRE. ID following, continue  Cefepime, linezolid, and Flagyl      Anemia  · Stable.   · Blood smear notable for Rouleaux formation      Acute cholecystitis  · As above      Left upper quadrant abdominal pain  · As above      Severe malnutrition  · On TPN for chronic diverticulitis  · Nutrition consult    Chronic combined systolic and diastolic congestive heart failure  See ADHF    Primary hypertension  · Chronic and stable  · Continue Coreg 12.5mg BID    Familial hypercholesterolemia  Continue Zetia  Might need Niacin  ? Not on statin, unclear why    Coronary artery disease of native artery of native heart with stable angina pectoris  · Chronic and stable  · Continue Aspirin 81mg PO daily  · Continue Coreg 12.5mg PO BID  · Prasugrel held on admission for surgery. Resume when safe from surgical standpoint and f/u her cardiologist outpatient    Automatic implantable cardioverter-defibrillator in situ  · Chronic and stable  · No acute issues      VTE Risk Mitigation (From admission, onward)         Ordered     enoxaparin injection 40 mg  Daily         05/06/22 2358     IP VTE HIGH RISK PATIENT  Once         05/06/22 2358     Place sequential compression device  Until discontinued         05/06/22 2352                        Evens Talley MD  Department of Hospital Medicine   [facility]

## 2022-05-11 NOTE — SUBJECTIVE & OBJECTIVE
Interval History:   No AEON.   Afebrile and WBC 3.34  Still with SOB  CXR: Stable predom perihilar interstitial opacities slightly improved vs prior.  The patient denies any recent fever, chills, or sweats.  CO abd pain and no appetite.  Denies flatus.  The patient denies any recent fever, chills, or sweats.      Review of Systems   Constitutional:  Negative for activity change, chills, diaphoresis and fever.   Respiratory:  Negative for cough, shortness of breath and wheezing.    Cardiovascular:  Negative for chest pain.   Gastrointestinal:  Positive for abdominal pain and nausea. Negative for constipation, diarrhea and vomiting.   Genitourinary:  Negative for decreased urine volume, difficulty urinating, dysuria, frequency and urgency.   Musculoskeletal:  Negative for back pain, gait problem and neck pain.   Neurological:  Negative for dizziness, weakness and numbness.   Hematological:  Does not bruise/bleed easily.   Objective:     Vital Signs (Most Recent):  Temp: 98.6 °F (37 °C) (05/11/22 0727)  Pulse: 82 (05/11/22 0727)  Resp: 18 (05/11/22 0727)  BP: (!) 106/53 (05/11/22 0727)  SpO2: (!) 93 % (05/11/22 0727)   Vital Signs (24h Range):  Temp:  [97.8 °F (36.6 °C)-98.7 °F (37.1 °C)] 98.6 °F (37 °C)  Pulse:  [67-91] 82  Resp:  [16-20] 18  SpO2:  [93 %-97 %] 93 %  BP: ()/(44-58) 106/53     Weight: 59.8 kg (131 lb 13.4 oz)  Body mass index is 23.35 kg/m².    Estimated Creatinine Clearance: 66.3 mL/min (based on SCr of 0.7 mg/dL).    Physical Exam  Constitutional:       General: She is not in acute distress.     Appearance: She is well-developed and underweight. She is ill-appearing. She is not diaphoretic.       HENT:      Head: Normocephalic and atraumatic.      Mouth/Throat:      Mouth: No oral lesions.      Pharynx: Uvula midline.   Eyes:      General: No scleral icterus.     Pupils: Pupils are equal, round, and reactive to light.   Cardiovascular:      Rate and Rhythm: Normal rate and regular rhythm.       Heart sounds: Normal heart sounds. No murmur heard.    No friction rub. No gallop.   Pulmonary:      Effort: Pulmonary effort is normal. No respiratory distress.      Breath sounds: Normal breath sounds. No stridor. No wheezing, rhonchi or rales.   Abdominal:      General: There is no distension.      Palpations: Abdomen is soft. There is no mass.      Tenderness: There is abdominal tenderness. There is no guarding or rebound.   Skin:     General: Skin is warm and dry.      Findings: No rash.   Neurological:      Mental Status: She is alert and oriented to person, place, and time.   Psychiatric:         Behavior: Behavior normal.                 Significant Labs: Blood Culture:   Recent Labs   Lab 02/25/22  1710 03/15/22  1254   LABBLOO No growth after 5 days.  No growth after 5 days. No growth after 5 days.     CBC:   Recent Labs   Lab 05/10/22  0508   WBC 3.34*   HGB 11.6*   HCT 37.9        CMP:   Recent Labs   Lab 05/10/22  0508 05/11/22  0420    131*   K 4.8 3.2*    91*   CO2 32* 31*   * 81   BUN 20 18   CREATININE 0.7 0.7   CALCIUM 8.6* 8.7   PROT 6.7  --    ALBUMIN 2.1*  --    BILITOT 1.1*  --    ALKPHOS 73  --    AST 44*  --    ALT 29  --    ANIONGAP 6* 9   EGFRNONAA >60.0 >60.0     All pertinent labs within the past 24 hours have been reviewed.    Significant Imaging: I have reviewed all pertinent imaging results/findings within the past 24 hours.  X-Ray Chest AP Portable [[id number]] Resulted: 05/11/22 1018   Order Status: Completed Updated: 05/11/22 1021   Narrative:     EXAMINATION:   XR CHEST AP PORTABLE     CLINICAL HISTORY:   eval for pulm edema;.     TECHNIQUE:   Single frontal portable view of the chest was performed.     COMPARISON:   Radiograph one view 05/07/2022     FINDINGS:   Dual lead pacer defibrillator device with leads terminating in the right heart.     Right-sided PICC line with tip terminating at the SVC.     Lungs are symmetrically expanded with patchy  opacities, primarily in a perihilar distribution, slightly improved compared to prior.  No pneumothorax or significant volume pleural fluid.     The cardiac silhouette is stable in appearance.  Prominence of the perihilar vasculature.     Osseous structures demonstrate degenerative changes.  No acute abnormality.    Impression:       Stable predominantly perihilar interstitial opacities, slightly improved compared to prior study.     Electronically signed by resident: Magdalnea Navarrete   Date: 05/11/2022   Time: 10:12     Electronically signed by: Denis Sepulveda MD   Date: 05/11/2022   Time: 10:18   X-Ray Chest 1 View [[id number]] Resulted: 05/07/22 1136   Order Status: Completed Updated: 05/07/22 1138   Narrative:     EXAMINATION:   XR CHEST 1 VIEW     CLINICAL HISTORY:   hypoxia;     TECHNIQUE:   Single frontal view of the chest was performed.     COMPARISON:   Radiograph 05/02/2022, 04/21/2022, 02/25/2022 .     FINDINGS:   Left chest wall cardiac ICD in place.  Right upper extremity PICC line tip projects over the SVC.     Cardiomediastinal silhouette is stable.  Coronary artery stent versus vascular calcification, similar to prior.  Mediastinal structures are midline.  Lungs are symmetrically expanded.  Prominent interstitial opacities throughout both lungs, similar to slightly more pronounced when compared to the most recent prior radiograph.  No focal consolidation, pneumothorax, or significant pleural effusion.    Impression:       Interstitial opacities throughout both lungs which may reflect edema or interstitial pneumonia.       Electronically signed by: Rudolph Cotton   Date: 05/07/2022   Time: 11:36       Imaging History    2022  Date Procedure Name Status Accession Number Location   05/11/22 09:58 AM X-Ray Chest AP Portable Final [id number] [facility]   05/07/22 11:14 AM X-Ray Chest 1 View Final [id number] [facility]   05/05/22 06:46 PM US Abdomen Limited Final [id number] [facility]   05/04/22 02:32 PM CT Abdomen Pelvis With  Contrast Final [id number] [facility]   05/02/22 05:40 PM X-Ray Abdomen AP 1 View Final [id number] [facility]   05/02/22 05:40 PM X-Ray Chest AP Portable Final [id number] [facility]   04/21/22 02:45 PM X-Ray Chest 1 View S/P PICC Line by Nursing Final [id number] [facility]   04/21/22 01:02 PM CT ABDOMEN PELVIS WITH CONTRAST Final [id number] [facility]

## 2022-05-11 NOTE — NURSING
Patient bed saturated when rounded on this afternoon. Patient denies spilling any water on herself and quinones was not leaking when turning patient side to side while cleaning. Patient denies bladder spasms as well. NP was notified of the leakage and WCTM for now at this point.

## 2022-05-11 NOTE — HPI
Odette Hart is a 66 y.o. 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 Patient was at [facility] and has been complaining of constant abdominal pain, poor PO intake, and N/V. She has been having abdominal pain, nausea/vomiting, and inability to tolerate PO since she was diagnosed with diverticulitis. They thought her symptoms were due to flagyl and it was discontinued with improvement, however her symptoms returned days ago.     She had a CT of her abdomen on 05/04 which showed:  1. Improving sigmoid colon diverticulitis.  2. Interval decrease in size of the collection between the anterior rectum and posterior vaginal cuff with persistent colorectal fistulous tract.  Previously noted collection along the anterior border of the sigmoid colon and left lateral border of the bladder is not seen in today's exam.  Air does remain in the bladder suggesting persistent fistula.  3. Distended gallbladder with pericholecystic fluid.  Could be seen in acute cholecystitis in the appropriate setting.  Further evaluation with ultrasound is recommended.  4. Persistent intraluminal air within the urinary bladder could be related to known colovesical fistula or catheterization.    She has undergone sigmoid colectomy, cholecystectomy, colovagino/vesico fistula resection, washout, and appendectomy with colostomy placement on 5/9.  She has been on cefepime and flagyl.  Recent Ucx showed VRE faecium sensitive only to zyvox and nitrofurantoin.  ID consulted for abx recs.

## 2022-05-11 NOTE — ASSESSMENT & PLAN NOTE
Chronic, controlled.  Latest blood pressure and vitals reviewed-   Temp:  [98 °F (36.7 °C)-98.7 °F (37.1 °C)]   Pulse:  [67-91]   Resp:  [16-20]   BP: ()/(44-58)   SpO2:  [93 %-97 %] .   Home meds for hypertension were reviewed and noted below.   Hypertension Medications             carvediloL (COREG) 12.5 MG tablet Take 1 tablet (12.5 mg total) by mouth 2 (two) times daily with meals.    nitroGLYCERIN (NITROSTAT) 0.4 MG SL tablet Place 1 tablet (0.4 mg total) under the tongue every 5 (five) minutes as needed for Chest pain.    spironolactone (ALDACTONE) 25 MG tablet Take 1 tablet (25 mg total) by mouth once daily.          While in the hospital, will manage blood pressure as follows; Continue home antihypertensive regimen    Will utilize p.r.n. blood pressure medication only if patient's blood pressure greater than  180/110 and she develops symptoms such as worsening chest pain or shortness of breath.

## 2022-05-11 NOTE — ASSESSMENT & PLAN NOTE
65 yo female with Hx of colovaginal/vesico fistula, abscess, secondary to diverticulitis and now cholecystitis s/p cholecystectomy, appendectomy, fistula repair, and sigmoid colectomy 5/9 found to have VRE in urine.  Already on flagyl and cefepime.  No OR cultures done.  ID consulted for VRE in urine.  Concern that abscess could have had VRE as well and possible yeast given intrabdominal;    SOB - CXR slightly improved    Plan:  1. Continue cefepime and flagyl x 7 d from sx  2. zyvox for VRE and fluconazole for yeast x 7 days from start - discussed with ID pharmacist and felt ok given other current meds  3. Labs weekly  4. Rec quinones change ASAP as likely contaminated  5. Discussed with ID staff   6. Will sign off - reconsult as needed

## 2022-05-11 NOTE — PROGRESS NOTES
[facility]  Colorectal Surgery  Progress Note    Patient Name: Odette Hart  MRN: [id number]  Admission Date: 5/6/2022  Hospital Length of Stay: 2 days  Attending Physician: Rafa Cardozo MD    Subjective:     Interval History: no acute events overnite, afraid to eat, adequate pain control, not wanting to eat or ambulate    Post-Op Info:  Procedure(s) (LRB):  COLECTOMY, SIGMOID (ERAS High, lithotomy) (N/A)  COLONOSCOPY (N/A)  CYSTOSCOPY,WITH URETERAL CATHETER INSERTION (Bilateral)  CHOLECYSTECTOMY (N/A)  APPENDECTOMY (N/A)  EXCISION, CECUM (N/A)  CREATION, COLOSTOMY   2 Days Post-Op      Medications:  Continuous Infusions:  Scheduled Meds:   acetaminophen  1,000 mg Oral Q8H    aspirin  81 mg Oral Daily    carvediloL  12.5 mg Oral BID WM    ceFEPime (MAXIPIME) IVPB  2 g Intravenous Q8H    enoxaparin  40 mg Subcutaneous Daily    ezetimibe  10 mg Oral QHS    fluconazole (DIFLUCAN) IV (PEDS and ADULTS)  400 mg Intravenous Q24H    linezolid  600 mg Intravenous Q12H    metronidazole  500 mg Intravenous Q8H    mupirocin   Nasal BID    ondansetron  4 mg Intravenous QID    pantoprazole  40 mg Oral Daily    scopolamine  1 patch Transdermal Q3 Days     PRN Meds:   albuterol-ipratropium    ALPRAZolam    aluminum-magnesium hydroxide-simethicone    dextrose 10%    dextrose 10%    glucagon (human recombinant)    glucose    glucose    HYDROmorphone    insulin aspart U-100    ondansetron    oxyCODONE    oxyCODONE    sodium chloride 0.9%    sodium chloride 0.9%        Objective:     Vital Signs (Most Recent):  Temp: 98.6 °F (37 °C) (05/11/22 1145)  Pulse: 88 (05/11/22 1215)  Resp: 18 (05/11/22 1215)  BP: (!) 105/56 (05/11/22 1145)  SpO2: 96 % (05/11/22 1215)   Vital Signs (24h Range):  Temp:  [98 °F (36.7 °C)-98.7 °F (37.1 °C)] 98.6 °F (37 °C)  Pulse:  [67-91] 88  Resp:  [16-20] 18  SpO2:  [93 %-97 %] 96 %  BP: ()/(44-58) 105/56     Intake/Output - Last 3 Shifts         05/09  0700  05/10 0659 05/10 0700 05/11 0659 05/11 0700 05/12 0659    IV Piggyback 1591.7      Total Intake(mL/kg) 1591.7 (26.6)      Urine (mL/kg/hr) 665 (0.5) 3050 (2.1) 300 (0.8)    Drains 90 40     Stool 0 0     Blood 150      Total Output 905 3090 300    Net +686.7 -3090 -300           Urine Occurrence  1 x     Stool Occurrence 0 x 0 x             Physical Exam  Vitals and nursing note reviewed.   Constitutional:       Appearance: She is well-developed. She is not ill-appearing.   HENT:      Head: Normocephalic.   Eyes:      Pupils: Pupils are equal, round, and reactive to light.   Cardiovascular:      Rate and Rhythm: Normal rate and regular rhythm.      Heart sounds: Normal heart sounds.   Pulmonary:      Effort: Pulmonary effort is normal. No respiratory distress.      Breath sounds: Normal breath sounds. No wheezing or rales.   Abdominal:      Palpations: Abdomen is soft. There is no mass.      Tenderness: There is no guarding or rebound.      Comments: Abd inc line healing well  Colostomy liquid stool   Skin:     General: Skin is warm and dry.   Neurological:      Mental Status: She is alert and oriented to person, place, and time.   Psychiatric:         Behavior: Behavior normal.         Thought Content: Thought content normal.         Judgment: Judgment normal.           Significant Labs:  BMP (Last 3 Results):   Recent Labs   Lab 05/07/22  0608 05/08/22  0530 05/09/22  0416 05/09/22  1214 05/10/22  0508 05/11/22  0420   GLU 75 99 83 138* 134* 81    133* 140 138 138 131*   K 3.9 3.7 3.5 3.1* 4.8 3.2*   CL 99 94* 97 102 100 91*   CO2 31* 30* 35* 27 32* 31*   BUN 21 20 12 13 20 18   CREATININE 0.6 0.7 0.6 0.6 0.7 0.7   CALCIUM 8.6* 8.4* 8.5* 7.8* 8.6* 8.7   MG 1.7 1.6 1.7  --   --   --      CBC (Last 3 Results):   Recent Labs   Lab 05/09/22  0416 05/09/22  1214 05/10/22  0508   WBC 2.98* 1.49* 3.34*   RBC 4.17 3.99* 4.22   HGB 11.5* 11.0* 11.6*   HCT 36.5* 35.4* 37.9    263 222   MCV 88 89 90    [facility] 27.6 27.6 27.5   [facility] 31.5* 31.1* 30.6*       Significant Diagnostics:  None    Assessment/Plan:     Acute cholecystitis  OR 5/9    Severe malnutrition  Nutrition consulted. Most recent weight and BMI monitored-     Malnutrition Type and Energy Intake  Malnutrition Type: chronic illness  Energy Intake: moderate energy intake    Malnutrition (Moderate to Severe)  Energy Intake (Malnutrition): less than 75% for greater than or equal to 1 month              Measurements:  Wt Readings from Last 1 Encounters:   05/09/22 59.8 kg (131 lb 13.4 oz)   Body mass index is 23.35 kg/m².    Recommendations: Recommendation/Intervention: 1.  Goals: Pt to meet >75% EEN/EPN by RD follow up    Patient has been screened and assessed by RD. RD will follow patient.      Urinary tract infection with pyuria  Consult to ID for hx of UTI with VRE, continuing antibiotics until seen by ID  Appreciate ID input, continue antibiotics 5-7 days post op    Colovesical fistula  Repaired during surgery 5/9  Cardona in place    Chronic combined systolic and diastolic congestive heart failure  Patient is identified as having Combined Systolic and Diastolic heart failure that is Chronic. CHF is currently controlled. Latest ECHO performed and demonstrates- Results for orders placed during the hospital encounter of 01/22/22    Echo Saline Bubble? No    Interpretation Summary  · The left ventricle is severely enlarged with mild eccentric hypertrophy and severely decreased systolic function.  · There are segmental left ventricular wall motion abnormalities. The mid anteroseptal and anterior walls are akinetic. The apex is akinetic. The remainder of the left ventricular walls are severely hypokinetic.  · The estimated ejection fraction is 20%.  · Spontaneous echocardiographic contrast in the left ventricle.  · Grade III left ventricular diastolic dysfunction.  · Severe left atrial enlargement.  · Normal right ventricular size with normal right ventricular  systolic function.  · Mild right atrial enlargement.  · Mild aortic regurgitation.  · Mild-to-moderate mitral regurgitation.  · Mild tricuspid regurgitation.  · The estimated PA systolic pressure is 28 mmHg.  · Intermediate central venous pressure (8 mmHg).  · Pacemaker/ICD lead.  . Continue home meds and monitor clinical status closely. Monitor on telemetry.   Recent Labs   Lab 05/09/22  1214   BNP 2,792*   .      Primary hypertension  Chronic, controlled.  Latest blood pressure and vitals reviewed-   Temp:  [98 °F (36.7 °C)-98.7 °F (37.1 °C)]   Pulse:  [67-91]   Resp:  [16-20]   BP: ()/(44-58)   SpO2:  [93 %-97 %] .   Home meds for hypertension were reviewed and noted below.   Hypertension Medications             carvediloL (COREG) 12.5 MG tablet Take 1 tablet (12.5 mg total) by mouth 2 (two) times daily with meals.    nitroGLYCERIN (NITROSTAT) 0.4 MG SL tablet Place 1 tablet (0.4 mg total) under the tongue every 5 (five) minutes as needed for Chest pain.    spironolactone (ALDACTONE) 25 MG tablet Take 1 tablet (25 mg total) by mouth once daily.          While in the hospital, will manage blood pressure as follows; Continue home antihypertensive regimen    Will utilize p.r.n. blood pressure medication only if patient's blood pressure greater than  180/110 and she develops symptoms such as worsening chest pain or shortness of breath.    Coronary artery disease of native artery of native heart with stable angina pectoris  Patient with known CAD s/p stent placement and CABG, which is controlled   Tele  Resume home meds    Acute diverticulitis  OR 5/9 sigmoid colectomy, end colostomy, takedown of fistuli, and cholecystectomy    -await return of bowel function, lfd  -continue multi modality for pain control, dc pca and begin oral jpain meds  -encourage ambulation and use of IS  -phani hose and SCD  -prophalactic lovenox and PPI  -quinones in place for 8-10 days    Automatic implantable cardioverter-defibrillator in  situ  Cont tele  Cont home meds  Monitor vs          Carola Buckley NP  Colorectal Surgery  [facility]

## 2022-05-11 NOTE — CONSULTS
[facility]  Infectious Disease  Consult Note    Patient Name: Odette Hart  MRN: [id number]  Admission Date: 5/6/2022  Hospital Length of Stay: 1 days  Attending Physician: Rafa Cardozo MD  Primary Care Provider: Braxton Barragan MD     Isolation Status: Contact    Patient information was obtained from patient and ER records.      Inpatient consult to Infectious Diseases  Consult performed by: TERESSA Salvador Jr.  Consult ordered by: Linda Manning MD        Assessment/Plan:     Urinary tract infection with pyuria  67 yo female with Hx of colovaginal/vesico fistula, abscess, secondary to diverticulitis and now cholecystitis s/p cholecystectomy, appendectomy, fistula repair, and sigmoid colectomy 5/9 found to have VRE in urine.  Already on flagyl and cefepime.  No OR cultures done.  ID consulted for VRE in urine.  Concern that abscess could have had VRE as well and possible yeast given intrabdominal;    Plan:  1. Continue cefepime and flagyl  2. Add zyvox for VRE and fluconazole for yeast - discussed with ID pharmacist and felt ok given other current meds  3. Plan fo 5-7 days  4. Discussed with ID staff - will follow.          Thank you for your consult. I will follow-up with patient. Please contact us if you have any additional questions.    TERESSA Ríos  Infectious Disease  [facility]    Subjective:     Principal Problem: Calculus of gallbladder with cholecystitis without biliary obstruction    HPI: Odette Hart is a 66 y.o. 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 Patient was at [facility] and has been complaining of constant abdominal pain, poor PO intake, and N/V. She has been having abdominal pain, nausea/vomiting, and inability to tolerate PO since she was diagnosed with  diverticulitis. They thought her symptoms were due to flagyl and it was discontinued with improvement, however her symptoms returned days ago.     She had a CT of her abdomen on 05/04 which showed:  1. Improving sigmoid colon diverticulitis.  2. Interval decrease in size of the collection between the anterior rectum and posterior vaginal cuff with persistent colorectal fistulous tract.  Previously noted collection along the anterior border of the sigmoid colon and left lateral border of the bladder is not seen in today's exam.  Air does remain in the bladder suggesting persistent fistula.  3. Distended gallbladder with pericholecystic fluid.  Could be seen in acute cholecystitis in the appropriate setting.  Further evaluation with ultrasound is recommended.  4. Persistent intraluminal air within the urinary bladder could be related to known colovesical fistula or catheterization.    She has undergone sigmoid colectomy, cholecystectomy, colovagino/vesico fistula resection, washout, and appendectomy with colostomy placement on 5/9.  She has been on cefepime and flagyl.  Recent Ucx showed VRE faecium sensitive only to zyvox and nitrofurantoin.  ID consulted for abx recs.      Past Medical History:   Diagnosis Date    Acute coronary syndrome     Allergy     Arthritis     Cardiomyopathy     CHF (congestive heart failure)     Coronary artery disease     Coronary artery disease of native artery of native heart with stable angina pectoris 4/19/2021 1/24/2022: [facility]: Cath: LAD: Proximal stent patent. LCX: Proximal 80%. LCX: Dominant. Moderate disease. LCX: HEVER 2.75 x 18 mm. Distal dissection. HEVER 2.75 x 22 mm.     Diverticulitis     Diverticulosis     Familial hypercholesterolemia 1/22/2022    Former smoker 1/24/2022    Heart attack     Heart disease     History of myocardial infarction 1/24/2022    Hyperlipidemia     Hypertension     Hypertension 1/24/2022    ICD (implantable cardioverter-defibrillator)  in place     Non-ST elevation myocardial infarction (NSTEMI) 2019       Past Surgical History:   Procedure Laterality Date    APPENDECTOMY N/A 2022    Procedure: APPENDECTOMY;  Surgeon: Rafa Cardozo MD;  Location: [facility] OR [facility] FLR;  Service: Colon and Rectal;  Laterality: N/A;    ATRIAL CARDIAC PACEMAKER INSERTION      CECECTOMY N/A 2022    Procedure: EXCISION, CECUM;  Surgeon: Rafa Cardozo MD;  Location: [facility] OR [facility] FLR;  Service: Colon and Rectal;  Laterality: N/A;     SECTION      CHOLECYSTECTOMY N/A 2022    Procedure: CHOLECYSTECTOMY;  Surgeon: Doug Epstein MD;  Location: [facility] OR [facility] FLR;  Service: General;  Laterality: N/A;    COLONOSCOPY N/A 2022    Procedure: COLONOSCOPY;  Surgeon: Rafa Cardozo MD;  [facility];  Service: Colon and Rectal;  Laterality: N/A;    COLOSTOMY  2022    Procedure: CREATION, COLOSTOMY;  Surgeon: Rafa Cardozo MD;  [facility];  Service: Colon and Rectal;;    CORONARY STENT PLACEMENT      LEFT HEART CATHETERIZATION Left 2022    Procedure: CATHETERIZATION, HEART, LEFT;  Surgeon: Yohannes Cordova MD;  Location: [facility];  Service: Cardiology;  Laterality: Left;       Review of patient's allergies indicates:   Allergen Reactions    Augmentin [amoxicillin-pot clavulanate] Swelling     Not allergic to amoxicillin just a derivative of augmentin    Lisinopril Other (See Comments)     Fluid around heart    Losartan Other (See Comments)     High potassium    Shellfish containing products Anaphylaxis     Pt.states she is allergic to SEAFOOD since the age of 13    Levaquin [levofloxacin] Other (See Comments)     Very bad joint pain    Clindamycin Palpitations     Chest pain       Medications:  Medications Prior to Admission   Medication Sig    carvediloL (COREG) 12.5 MG tablet Take 1 tablet (12.5 mg total) by mouth 2 (two) times daily with meals.    acetaminophen (TYLENOL) 500 MG  tablet Take 2 tablets (1,000 mg total) by mouth every 8 (eight) hours as needed for Pain.    albuterol (PROVENTIL/VENTOLIN HFA) 90 mcg/actuation inhaler Inhale 2 puffs into the lungs every 6 (six) hours as needed for Wheezing. Rescue    ALPRAZolam (XANAX) 0.5 MG tablet Take 1 tablet (0.5 mg total) by mouth nightly as needed for Anxiety.    aspirin (ECOTRIN) 81 MG EC tablet Take 1 tablet (81 mg total) by mouth once daily.    [] dicyclomine (BENTYL) 10 MG capsule Take 1 capsule (10 mg total) by mouth 4 (four) times daily as needed.    ezetimibe (ZETIA) 10 mg tablet Take 1 tablet (10 mg total) by mouth every evening.    fluticasone propionate (FLONASE) 50 mcg/actuation nasal spray 1 spray (50 mcg total) by Each Nostril route once daily.    Lactobacillus rhamnosus GG (CULTURELLE) 10 billion cell capsule Take 1 capsule by mouth once daily.    metronidazole (FLAGYL) 50 mg/mL Syrg Take 10 mLs (500 mg total) by mouth every 8 (eight) hours.    nitroGLYCERIN (NITROSTAT) 0.4 MG SL tablet Place 1 tablet (0.4 mg total) under the tongue every 5 (five) minutes as needed for Chest pain. (Patient not taking: Reported on 2022)    omeprazole (PRILOSEC) 40 MG capsule Take 1 capsule (40 mg total) by mouth once daily.    prasugreL (EFFIENT) 10 mg Tab Take 1 tablet (10 mg total) by mouth once daily.    promethazine (PROMETHEGAN) 25 MG suppository Place 1 suppository (25 mg total) rectally every 6 (six) hours as needed for Nausea.    simethicone (MYLICON) 80 MG chewable tablet Take 80 mg by mouth every 8 (eight) hours as needed.    spironolactone (ALDACTONE) 25 MG tablet Take 1 tablet (25 mg total) by mouth once daily.     Antibiotics (From admission, onward)                Start     Stop Route Frequency Ordered    05/10/22 1700  linezolid 600 mg/300 mL IVPB 600 mg         -- IV Every 12 hours (non-standard times) 05/10/22 1559    05/10/22 1630  metronidazole IVPB 500 mg         -- IV Every 8 hours (non-standard  times) 05/10/22 1525    22 2100  mupirocin 2 % ointment          205 Nasl 2 times daily 22 1205    22 0600  cefepime in dextrose 5 % IVPB 2 g          1359 IV Every 8 hours 22 0121          Antifungals (From admission, onward)                Start     Stop Route Frequency Ordered    05/10/22 1800  fluconazole (DIFLUCAN) IVPB 400 mg         -- IV Every 24 hours (non-standard times) 05/10/22 1559          Antivirals (From admission, onward)      None             Immunization History   Administered Date(s) Administered    COVID-19, MRNA, LN-S, PF (MODERNA FULL 0.5 ML DOSE) 08/10/2021    Influenza (FLUBLOK) - Quadrivalent - Recombinant - PF *Preferred* (egg allergy) 10/27/2020    Td (Adult), Unspecified Formulation 2005    Td - PF (ADULT) 2021       Family History       Problem Relation (Age of Onset)    Emphysema Father    Heart attack Brother    Heart disease Mother          Social History     Socioeconomic History    Marital status:    Tobacco Use    Smoking status: Former Smoker     Packs/day: 0.50     Start date: 1976     Quit date: 2012     Years since quittin.4    Smokeless tobacco: Never Used   Substance and Sexual Activity    Alcohol use: No    Drug use: No     Review of Systems   Constitutional:  Positive for fatigue. Negative for activity change, appetite change, chills, diaphoresis, fever and unexpected weight change.   HENT:  Negative for congestion, ear pain, hearing loss, sore throat and tinnitus.    Eyes:  Negative for pain, redness and visual disturbance.   Respiratory:  Positive for shortness of breath. Negative for cough, chest tightness and wheezing.    Cardiovascular:  Negative for chest pain.   Gastrointestinal:  Positive for abdominal pain. Negative for constipation, diarrhea, nausea and vomiting.   Endocrine: Negative for cold intolerance and heat intolerance.   Genitourinary:  Negative for decreased urine volume,  difficulty urinating, dysuria, flank pain, frequency, hematuria and urgency.   Musculoskeletal:  Negative for arthralgias, back pain, myalgias and neck pain.   Skin:  Negative for rash and wound.   Allergic/Immunologic: Negative for environmental allergies, food allergies and immunocompromised state.   Neurological:  Negative for dizziness, facial asymmetry, weakness, light-headedness, numbness and headaches.   Hematological:  Negative for adenopathy. Does not bruise/bleed easily.   Psychiatric/Behavioral:  Negative for agitation, behavioral problems and confusion.    Objective:     Vital Signs (Most Recent):  Temp: 98 °F (36.7 °C) (05/10/22 1617)  Pulse: 85 (05/10/22 1900)  Resp: 18 (05/10/22 1617)  BP: (!) 99/47 (05/10/22 1636)  SpO2: (!) 93 % (05/10/22 1618)   Vital Signs (24h Range):  Temp:  [97 °F (36.1 °C)-98 °F (36.7 °C)] 98 °F (36.7 °C)  Pulse:  [64-85] 85  Resp:  [16-20] 18  SpO2:  [93 %-96 %] 93 %  BP: ()/(44-55) 99/47     Weight: 59.8 kg (131 lb 13.4 oz)  Body mass index is 23.35 kg/m².    Estimated Creatinine Clearance: 66.3 mL/min (based on SCr of 0.7 mg/dL).    Physical Exam  Constitutional:       General: She is not in acute distress.     Appearance: She is well-developed and underweight. She is not diaphoretic.       HENT:      Head: Normocephalic and atraumatic.      Mouth/Throat:      Mouth: No oral lesions.      Pharynx: Uvula midline.   Eyes:      General: No scleral icterus.     Pupils: Pupils are equal, round, and reactive to light.   Cardiovascular:      Rate and Rhythm: Normal rate and regular rhythm.      Heart sounds: Normal heart sounds. No murmur heard.    No friction rub. No gallop.   Pulmonary:      Effort: Pulmonary effort is normal. No respiratory distress.      Breath sounds: Normal breath sounds. No wheezing or rales.   Abdominal:      General: Bowel sounds are normal. There is no distension.      Palpations: Abdomen is soft. There is no mass.      Tenderness: There is no  abdominal tenderness. There is no guarding or rebound.   Skin:     General: Skin is warm and dry.      Findings: No rash.   Neurological:      Mental Status: She is alert and oriented to person, place, and time.   Psychiatric:         Behavior: Behavior normal.       Significant Labs: Blood Culture:   Recent Labs   Lab 02/25/22  1710 03/15/22  1254   LABBLOO No growth after 5 days.  No growth after 5 days. No growth after 5 days.     CBC:   Recent Labs   Lab 05/09/22  0416 05/09/22  1214 05/10/22  0508   WBC 2.98* 1.49* 3.34*   HGB 11.5* 11.0* 11.6*   HCT 36.5* 35.4* 37.9    263 222     CMP:   Recent Labs   Lab 05/09/22  0416 05/09/22  1214 05/10/22  0508    138 138   K 3.5 3.1* 4.8   CL 97 102 100   CO2 35* 27 32*   GLU 83 138* 134*   BUN 12 13 20   CREATININE 0.6 0.6 0.7   CALCIUM 8.5* 7.8* 8.6*   PROT 7.1  --  6.7   ALBUMIN 2.1*  --  2.1*   BILITOT 1.1*  --  1.1*   ALKPHOS 83  --  73   AST 43*  --  44*   ALT 28  --  29   ANIONGAP 8 9 6*   EGFRNONAA >60.0 >60.0 >60.0     Urine Culture:   Recent Labs   Lab 02/25/22  1807 03/15/22  1225 04/20/22  1418 05/06/22  2219   LABURIN Multiple organisms isolated. None in predominance.  Repeat if  clinically necessary. Multiple organisms isolated. None in predominance.  Repeat if  clinically necessary. ENTEROBACTER CLOACAE  >100,000 cfu/ml  *  PSEUDOMONAS AERUGINOSA  10,000 - 49,999 cfu/ml  * ENTEROCOCCUS FAECIUM VRE  10,000 - 49,999 cfu/ml  *     Urine Studies:   Recent Labs   Lab 01/23/22  0141 02/25/22  1807 05/06/22  2219   COLORU Yellow   < > Yellow   APPEARANCEUA Clear   < > Clear   PHUR 7.0   < > 6.0   SPECGRAV 1.010   < > 1.010   PROTEINUA Negative   < > 1+*   GLUCUA Negative   < > Negative   KETONESU Negative   < > Negative   BILIRUBINUA Negative   < > Negative   OCCULTUA Trace*   < > 1+*   NITRITE Positive*   < > Negative   UROBILINOGEN Negative  --   --    LEUKOCYTESUR Negative   < > 2+*   RBCUA 3   < > 5*   WBCUA  --    < > 48*   BACTERIA  Occasional   < > Occasional   SQUAMEPITHEL  --    < > 1   HYALINECASTS  --    < > 0    < > = values in this interval not displayed.     Wound Culture: No results for input(s): LABAERO in the last 4320 hours.  All pertinent labs within the past 24 hours have been reviewed.    Significant Imaging:   Procedure Component Value Units Date/Time   X-Ray Chest 1 View [[id number]] Resulted: 05/07/22 1136   Order Status: Completed Updated: 05/07/22 1138   Narrative:     EXAMINATION:   XR CHEST 1 VIEW     CLINICAL HISTORY:   hypoxia;     TECHNIQUE:   Single frontal view of the chest was performed.     COMPARISON:   Radiograph 05/02/2022, 04/21/2022, 02/25/2022 .     FINDINGS:   Left chest wall cardiac ICD in place.  Right upper extremity PICC line tip projects over the SVC.     Cardiomediastinal silhouette is stable.  Coronary artery stent versus vascular calcification, similar to prior.  Mediastinal structures are midline.  Lungs are symmetrically expanded.  Prominent interstitial opacities throughout both lungs, similar to slightly more pronounced when compared to the most recent prior radiograph.  No focal consolidation, pneumothorax, or significant pleural effusion.    Impression:       Interstitial opacities throughout both lungs which may reflect edema or interstitial pneumonia.       Electronically signed by: Rudolph Cotton   Date: 05/07/2022   Time: 11:36       Imaging History    2022  Date Procedure Name Status Accession Number Location   05/07/22 11:14 AM X-Ray Chest 1 View Final [id number] [facility]   05/05/22 06:46 PM US Abdomen Limited Final [id number] [facility]   05/04/22 02:32 PM CT Abdomen Pelvis With Contrast Final [id number] [facility]   05/02/22 05:40 PM X-Ray Abdomen AP 1 View Final [id number] [facility]   05/02/22 05:40 PM X-Ray Chest AP Portable Final [id number] [facility]   04/21/22 02:45 PM X-Ray Chest 1 View S/P PICC Line by Nursing Final [id number] [facility]   04/21/22 01:02 PM CT ABDOMEN PELVIS WITH CONTRAST Final [id number] [facility]

## 2022-05-11 NOTE — PT/OT/SLP PROGRESS
Occupational Therapy   Treatment    Name: Odette Hart  MRN: [id number]  Admitting Diagnosis:  Calculus of gallbladder with cholecystitis without biliary obstruction  2 Days Post-Op    Recommendations:     Discharge Recommendations: home health OT  Discharge Equipment Recommendations:  walker, rolling  Barriers to discharge:  Decreased caregiver support    Assessment:     Odette Hart is a 66 y.o. female with a medical diagnosis of Calculus of gallbladder with cholecystitis without biliary obstruction. Performance deficits affecting function are weakness, impaired endurance, impaired self care skills, impaired functional mobilty, pain, gait instability, impaired balance, impaired cardiopulmonary response to activity.     Pt was agreeable to session despite reports of pain on this date. With increased time and effort, pt engaged in functional mobility and ADLs at bedside with Mod (A)-SBA. Pt tolerated sitting edge of bed for ~10 minutes with c/o of ongoing pain and nausea. Pt then performed upper body dressing and drank small sips from straw prior to returning to rest in bed. Overall, pt was severely limited on this date secondary to pain and generalized weakness requiring increased (A). She would benefit from continued OT in order to return to [facility] and facilitate safe discharge.     Rehab Prognosis:  Good; patient would benefit from acute skilled OT services to address these deficits and reach maximum level of function.       Plan:     Patient to be seen 3 x/week to address the above listed problems via self-care/home management, therapeutic activities, therapeutic exercises  · Plan of Care Expires: 06/09/22  · Plan of Care Reviewed with: patient    Subjective     Pain/Comfort:  Pain Rating 1: other (see comments) (Pt did not rate)  Location - Orientation 1: generalized  Location 1: abdomen  Pain Addressed 1: Reposition, Cessation of Activity, Nurse notified  Pain Rating Post-Intervention 1: other (see  comments) (pt did not rate)    Objective:     Communicated with: RN prior to session.  Patient found right sidelying with PCT/RN with telemetry, oxygen, quinones catheter, PRASAD drain upon OT entry to room.    General Precautions: Standard, fall, contact  Orthopedic Precautions:N/A   Braces: N/A  Respiratory Status: Nasal cannula, flow 2 L/min     Occupational Performance:     Bed Mobility:    · Patient completed Rolling/Turning to Left with CGA and cueing for hand placement on railing  · Patient completed Rolling/Turning to the Right with CGA and cueing for hand placement on railing  · Patient completed Scooting/Bridging with stand by assistance to bring hips anteriorly and feet flat on floor  · Increased time required  · Patient completed Supine to Sit with moderate assistance to facilitate transitioning of trunk upright   · HOB elevated and increased time required  · Patient completed Sit to Supine with minimum assistance to raise LLE onto bed  · Cueing to bring hips to the right via glute bridge    Functional Mobility/Transfers:  · Patient completed Sit <> Stand Transfer x1 trial from EOB with CGA and no AD  · Pt ambulated 4 small steps to the left x1 trial with CGA and no AD  · Increased trunk flexion observed due to pain    Activities of Daily Living:  · Feeding: Set-up (A) to drink water from straw seated EOB  · Upper Body Dressing: moderate assistance to don/doff hospital gown anteriorly seated EOB    Balance:  Fair+ (SBA) for static sitting balance EOB and tolerated for ~10 minutes     [facility] 6 Click ADL: 15    Treatment & Education:  -Education on energy conservation and task modification to maximize safety and (I) during ADLs and mobility  -Education on importance of OOB activity to improve overall activity tolerance and promote recovery  -Pt educated to call for assistance and to transfer with hospital staff only  -Provided education regarding role of OT, POC, & discharge recommendations with pt verbalizing  understanding.  Pt had no further questions & when asked whether there were any concerns pt reported none.    Patient left right sidelying with all lines intact, call button in reach and PTA  presentEducation:      GOALS:   Multidisciplinary Problems     Occupational Therapy Goals        Problem: Occupational Therapy    Goal Priority Disciplines Outcome Interventions   Occupational Therapy Goal     OT, PT/OT Ongoing, Progressing    Description: Goals to be met by: 5/24/22     Patient will increase functional independence with ADLs by performing:    Feeding with [place].  UE Dressing with [place].  LE Dressing with Stand-by Assistance.  Grooming while standing at sink with Supervision.  Toileting from toilet with Supervision for hygiene and clothing management.   Toilet transfer to toilet with Supervision.  Pt will engage in functional mobility to simulate household distances in order to maximize functional activity tolerance required for engagement in occupations of choice with supervision using LRAD.                    Time Tracking:     OT Date of Treatment: 05/11/22  OT Start Time: 1455  OT Stop Time: 1519  OT Total Time (min): 24 min    Billable Minutes:Self Care/Home Management 10  Therapeutic Activity 14    OT/FROILAN: OT     FROILAN Visit Number: 0    5/11/2022

## 2022-05-11 NOTE — NURSING
Patient turned in bed, redness and purple spot noted to sacrum. Wound care consulted and SOS submitted.

## 2022-05-11 NOTE — ASSESSMENT & PLAN NOTE
Consult to ID for hx of UTI with VRE, continuing antibiotics until seen by ID  Appreciate ID input, continue antibiotics 5-7 days post op

## 2022-05-11 NOTE — SUBJECTIVE & OBJECTIVE
Interval History: POD2. reports she is tired from minimal sleep while inpatient. Expected post op pain. Orthopnea improved and now on 0.5L O2. Na trended down this AM. Holding further diuresis     Review of Systems   Constitutional:  Negative for chills, fatigue and fever.   Respiratory:  Negative for cough and shortness of breath.    Cardiovascular:  Negative for chest pain, palpitations and leg swelling.   Gastrointestinal:  Positive for abdominal pain and nausea. Negative for diarrhea and vomiting.   Genitourinary:  Negative for dysuria and urgency.   Neurological:  Negative for dizziness and headaches.   All other systems reviewed and are negative.  Objective:     Vital Signs (Most Recent):  Temp: 98.6 °F (37 °C) (05/11/22 1145)  Pulse: 88 (05/11/22 1215)  Resp: 18 (05/11/22 1215)  BP: (!) 105/56 (05/11/22 1145)  SpO2: 96 % (05/11/22 1215)   Vital Signs (24h Range):  Temp:  [98 °F (36.7 °C)-98.7 °F (37.1 °C)] 98.6 °F (37 °C)  Pulse:  [67-91] 88  Resp:  [16-20] 18  SpO2:  [93 %-97 %] 96 %  BP: ()/(44-58) 105/56     Weight: 59.8 kg (131 lb 13.4 oz)  Body mass index is 23.35 kg/m².    Intake/Output Summary (Last 24 hours) at 5/11/2022 1530  Last data filed at 5/11/2022 1506  Gross per 24 hour   Intake 230 ml   Output 4240 ml   Net -4010 ml      Physical Exam  Constitutional:       Appearance: Normal appearance. She is well-developed.   HENT:      Head: Normocephalic and atraumatic.      Mouth/Throat:      Mouth: Mucous membranes are moist.   Eyes:      Conjunctiva/sclera: Conjunctivae normal.   Neck:      Comments: No JVD  Cardiovascular:      Rate and Rhythm: Normal rate and regular rhythm.      Heart sounds: No murmur heard.  Pulmonary:      Effort: Pulmonary effort is normal. No respiratory distress.      Breath sounds: Normal breath sounds. No wheezing or rales.      Comments: On 0.5L NC  Abdominal:      General: There is no distension.      Palpations: Abdomen is soft.      Tenderness: There is abdominal  tenderness.      Comments: Colostomy bag with minimal stool and gas, incision site c/d/i   Musculoskeletal:      Right lower leg: No edema.      Left lower leg: No edema.      Comments: PICC in RUE   Skin:     General: Skin is warm.   Neurological:      General: No focal deficit present.      Mental Status: She is alert and oriented to person, place, and time.   Psychiatric:      Comments: Appears tired       Significant Labs: All pertinent labs within the past 24 hours have been reviewed.  CBC:   Recent Labs   Lab 05/10/22  0508   WBC 3.34*   HGB 11.6*   HCT 37.9        CMP:   Recent Labs   Lab 05/10/22  0508 05/11/22  0420    131*   K 4.8 3.2*    91*   CO2 32* 31*   * 81   BUN 20 18   CREATININE 0.7 0.7   CALCIUM 8.6* 8.7   PROT 6.7  --    ALBUMIN 2.1*  --    BILITOT 1.1*  --    ALKPHOS 73  --    AST 44*  --    ALT 29  --    ANIONGAP 6* 9   EGFRNONAA >60.0 >60.0       Significant Imaging: I have reviewed all pertinent imaging results/findings within the past 24 hours.

## 2022-05-11 NOTE — SUBJECTIVE & OBJECTIVE
Past Medical History:   Diagnosis Date    Acute coronary syndrome     Allergy     Arthritis     Cardiomyopathy     CHF (congestive heart failure)     Coronary artery disease     Coronary artery disease of native artery of native heart with stable angina pectoris 2021: [facility]: Cath: LAD: Proximal stent patent. LCX: Proximal 80%. LCX: Dominant. Moderate disease. LCX: HEVER 2.75 x 18 mm. Distal dissection. HEVER 2.75 x 22 mm.     Diverticulitis     Diverticulosis     Familial hypercholesterolemia 2022    Former smoker 2022    Heart attack     Heart disease     History of myocardial infarction 2022    Hyperlipidemia     Hypertension     Hypertension 2022    ICD (implantable cardioverter-defibrillator) in place     Non-ST elevation myocardial infarction (NSTEMI) 2019       Past Surgical History:   Procedure Laterality Date    APPENDECTOMY N/A 2022    Procedure: APPENDECTOMY;  Surgeon: Rafa Cardozo MD;  [facility];  Service: Colon and Rectal;  Laterality: N/A;    ATRIAL CARDIAC PACEMAKER INSERTION      CECECTOMY N/A 2022    Procedure: EXCISION, CECUM;  Surgeon: Rafa Cardozo MD;  [facility];  Service: Colon and Rectal;  Laterality: N/A;     SECTION      CHOLECYSTECTOMY N/A 2022    Procedure: CHOLECYSTECTOMY;  Surgeon: Doug Epstein MD;  [facility];  Service: General;  Laterality: N/A;    COLONOSCOPY N/A 2022    Procedure: COLONOSCOPY;  Surgeon: Rafa Cardozo MD;  [facility];  Service: Colon and Rectal;  Laterality: N/A;    COLOSTOMY  2022    Procedure: CREATION, COLOSTOMY;  Surgeon: Rafa Cardozo MD;  [facility];  Service: Colon and Rectal;;    CORONARY STENT PLACEMENT      LEFT HEART CATHETERIZATION Left 2022    Procedure: CATHETERIZATION, HEART, LEFT;  Surgeon: Yohannes Cordova MD;  Location: [facility] C[facility];  Service: Cardiology;  Laterality: Left;        Review of patient's allergies indicates:   Allergen Reactions    Augmentin [amoxicillin-pot clavulanate] Swelling     Not allergic to amoxicillin just a derivative of augmentin    Lisinopril Other (See Comments)     Fluid around heart    Losartan Other (See Comments)     High potassium    Shellfish containing products Anaphylaxis     Pt.states she is allergic to SEAFOOD since the age of 13    Levaquin [levofloxacin] Other (See Comments)     Very bad joint pain    Clindamycin Palpitations     Chest pain       Medications:  Medications Prior to Admission   Medication Sig    carvediloL (COREG) 12.5 MG tablet Take 1 tablet (12.5 mg total) by mouth 2 (two) times daily with meals.    acetaminophen (TYLENOL) 500 MG tablet Take 2 tablets (1,000 mg total) by mouth every 8 (eight) hours as needed for Pain.    albuterol (PROVENTIL/VENTOLIN HFA) 90 mcg/actuation inhaler Inhale 2 puffs into the lungs every 6 (six) hours as needed for Wheezing. Rescue    ALPRAZolam (XANAX) 0.5 MG tablet Take 1 tablet (0.5 mg total) by mouth nightly as needed for Anxiety.    aspirin (ECOTRIN) 81 MG EC tablet Take 1 tablet (81 mg total) by mouth once daily.    [] dicyclomine (BENTYL) 10 MG capsule Take 1 capsule (10 mg total) by mouth 4 (four) times daily as needed.    ezetimibe (ZETIA) 10 mg tablet Take 1 tablet (10 mg total) by mouth every evening.    fluticasone propionate (FLONASE) 50 mcg/actuation nasal spray 1 spray (50 mcg total) by Each Nostril route once daily.    Lactobacillus rhamnosus GG (CULTURELLE) 10 billion cell capsule Take 1 capsule by mouth once daily.    metronidazole (FLAGYL) 50 mg/mL Syrg Take 10 mLs (500 mg total) by mouth every 8 (eight) hours.    nitroGLYCERIN (NITROSTAT) 0.4 MG SL tablet Place 1 tablet (0.4 mg total) under the tongue every 5 (five) minutes as needed for Chest pain. (Patient not taking: Reported on 2022)    omeprazole (PRILOSEC) 40 MG capsule Take 1 capsule (40 mg total) by mouth once  daily.    prasugreL (EFFIENT) 10 mg Tab Take 1 tablet (10 mg total) by mouth once daily.    promethazine (PROMETHEGAN) 25 MG suppository Place 1 suppository (25 mg total) rectally every 6 (six) hours as needed for Nausea.    simethicone (MYLICON) 80 MG chewable tablet Take 80 mg by mouth every 8 (eight) hours as needed.    spironolactone (ALDACTONE) 25 MG tablet Take 1 tablet (25 mg total) by mouth once daily.     Antibiotics (From admission, onward)                Start     Stop Route Frequency Ordered    05/10/22 1700  linezolid 600 mg/300 mL IVPB 600 mg         -- IV Every 12 hours (non-standard times) 05/10/22 1559    05/10/22 1630  metronidazole IVPB 500 mg         -- IV Every 8 hours (non-standard times) 05/10/22 1525    22 2100  mupirocin 2 % ointment          2059 Nasl 2 times daily 22 1205    22 0600  cefepime in dextrose 5 % IVPB 2 g          1359 IV Every 8 hours 22 0121          Antifungals (From admission, onward)                Start     Stop Route Frequency Ordered    05/10/22 1800  fluconazole (DIFLUCAN) IVPB 400 mg         -- IV Every 24 hours (non-standard times) 05/10/22 1559          Antivirals (From admission, onward)      None             Immunization History   Administered Date(s) Administered    COVID-19, MRNA, LN-S, PF (MODERNA FULL 0.5 ML DOSE) 08/10/2021    Influenza (FLUBLOK) - Quadrivalent - Recombinant - PF *Preferred* (egg allergy) 10/27/2020    Td (Adult), Unspecified Formulation 2005    Td - PF (ADULT) 2021       Family History       Problem Relation (Age of Onset)    Emphysema Father    Heart attack Brother    Heart disease Mother          Social History     Socioeconomic History    Marital status:    Tobacco Use    Smoking status: Former Smoker     Packs/day: 0.50     Start date: 1976     Quit date: 2012     Years since quittin.4    Smokeless tobacco: Never Used   Substance and Sexual Activity    Alcohol use: No     Drug use: No     Review of Systems   Constitutional:  Positive for fatigue. Negative for activity change, appetite change, chills, diaphoresis, fever and unexpected weight change.   HENT:  Negative for congestion, ear pain, hearing loss, sore throat and tinnitus.    Eyes:  Negative for pain, redness and visual disturbance.   Respiratory:  Positive for shortness of breath. Negative for cough, chest tightness and wheezing.    Cardiovascular:  Negative for chest pain.   Gastrointestinal:  Positive for abdominal pain. Negative for constipation, diarrhea, nausea and vomiting.   Endocrine: Negative for cold intolerance and heat intolerance.   Genitourinary:  Negative for decreased urine volume, difficulty urinating, dysuria, flank pain, frequency, hematuria and urgency.   Musculoskeletal:  Negative for arthralgias, back pain, myalgias and neck pain.   Skin:  Negative for rash and wound.   Allergic/Immunologic: Negative for environmental allergies, food allergies and immunocompromised state.   Neurological:  Negative for dizziness, facial asymmetry, weakness, light-headedness, numbness and headaches.   Hematological:  Negative for adenopathy. Does not bruise/bleed easily.   Psychiatric/Behavioral:  Negative for agitation, behavioral problems and confusion.    Objective:     Vital Signs (Most Recent):  Temp: 98 °F (36.7 °C) (05/10/22 1617)  Pulse: 85 (05/10/22 1900)  Resp: 18 (05/10/22 1617)  BP: (!) 99/47 (05/10/22 1636)  SpO2: (!) 93 % (05/10/22 1618)   Vital Signs (24h Range):  Temp:  [97 °F (36.1 °C)-98 °F (36.7 °C)] 98 °F (36.7 °C)  Pulse:  [64-85] 85  Resp:  [16-20] 18  SpO2:  [93 %-96 %] 93 %  BP: ()/(44-55) 99/47     Weight: 59.8 kg (131 lb 13.4 oz)  Body mass index is 23.35 kg/m².    Estimated Creatinine Clearance: 66.3 mL/min (based on SCr of 0.7 mg/dL).    Physical Exam  Constitutional:       General: She is not in acute distress.     Appearance: She is well-developed and underweight. She is not  diaphoretic.       HENT:      Head: Normocephalic and atraumatic.      Mouth/Throat:      Mouth: No oral lesions.      Pharynx: Uvula midline.   Eyes:      General: No scleral icterus.     Pupils: Pupils are equal, round, and reactive to light.   Cardiovascular:      Rate and Rhythm: Normal rate and regular rhythm.      Heart sounds: Normal heart sounds. No murmur heard.    No friction rub. No gallop.   Pulmonary:      Effort: Pulmonary effort is normal. No respiratory distress.      Breath sounds: Normal breath sounds. No wheezing or rales.   Abdominal:      General: Bowel sounds are normal. There is no distension.      Palpations: Abdomen is soft. There is no mass.      Tenderness: There is no abdominal tenderness. There is no guarding or rebound.   Skin:     General: Skin is warm and dry.      Findings: No rash.   Neurological:      Mental Status: She is alert and oriented to person, place, and time.   Psychiatric:         Behavior: Behavior normal.       Significant Labs: Blood Culture:   Recent Labs   Lab 02/25/22  1710 03/15/22  1254   LABBLOO No growth after 5 days.  No growth after 5 days. No growth after 5 days.     CBC:   Recent Labs   Lab 05/09/22  0416 05/09/22  1214 05/10/22  0508   WBC 2.98* 1.49* 3.34*   HGB 11.5* 11.0* 11.6*   HCT 36.5* 35.4* 37.9    263 222     CMP:   Recent Labs   Lab 05/09/22  0416 05/09/22  1214 05/10/22  0508    138 138   K 3.5 3.1* 4.8   CL 97 102 100   CO2 35* 27 32*   GLU 83 138* 134*   BUN 12 13 20   CREATININE 0.6 0.6 0.7   CALCIUM 8.5* 7.8* 8.6*   PROT 7.1  --  6.7   ALBUMIN 2.1*  --  2.1*   BILITOT 1.1*  --  1.1*   ALKPHOS 83  --  73   AST 43*  --  44*   ALT 28  --  29   ANIONGAP 8 9 6*   EGFRNONAA >60.0 >60.0 >60.0     Urine Culture:   Recent Labs   Lab 02/25/22  1807 03/15/22  1225 04/20/22  1418 05/06/22  2219   LABURIN Multiple organisms isolated. None in predominance.  Repeat if  clinically necessary. Multiple organisms isolated. None in predominance.   Repeat if  clinically necessary. ENTEROBACTER CLOACAE  >100,000 cfu/ml  *  PSEUDOMONAS AERUGINOSA  10,000 - 49,999 cfu/ml  * ENTEROCOCCUS FAECIUM VRE  10,000 - 49,999 cfu/ml  *     Urine Studies:   Recent Labs   Lab 01/23/22  0141 02/25/22  1807 05/06/22  2219   COLORU Yellow   < > Yellow   APPEARANCEUA Clear   < > Clear   PHUR 7.0   < > 6.0   SPECGRAV 1.010   < > 1.010   PROTEINUA Negative   < > 1+*   GLUCUA Negative   < > Negative   KETONESU Negative   < > Negative   BILIRUBINUA Negative   < > Negative   OCCULTUA Trace*   < > 1+*   NITRITE Positive*   < > Negative   UROBILINOGEN Negative  --   --    LEUKOCYTESUR Negative   < > 2+*   RBCUA 3   < > 5*   WBCUA  --    < > 48*   BACTERIA Occasional   < > Occasional   SQUAMEPITHEL  --    < > 1   HYALINECASTS  --    < > 0    < > = values in this interval not displayed.     Wound Culture: No results for input(s): LABAERO in the last 4320 hours.  All pertinent labs within the past 24 hours have been reviewed.    Significant Imaging:   Procedure Component Value Units Date/Time   X-Ray Chest 1 View [[id number]] Resulted: 05/07/22 1136   Order Status: Completed Updated: 05/07/22 1138   Narrative:     EXAMINATION:   XR CHEST 1 VIEW     CLINICAL HISTORY:   hypoxia;     TECHNIQUE:   Single frontal view of the chest was performed.     COMPARISON:   Radiograph 05/02/2022, 04/21/2022, 02/25/2022 .     FINDINGS:   Left chest wall cardiac ICD in place.  Right upper extremity PICC line tip projects over the SVC.     Cardiomediastinal silhouette is stable.  Coronary artery stent versus vascular calcification, similar to prior.  Mediastinal structures are midline.  Lungs are symmetrically expanded.  Prominent interstitial opacities throughout both lungs, similar to slightly more pronounced when compared to the most recent prior radiograph.  No focal consolidation, pneumothorax, or significant pleural effusion.    Impression:       Interstitial opacities throughout both lungs which may  reflect edema or interstitial pneumonia.       Electronically signed by: Rudolph Cotton   Date: 05/07/2022   Time: 11:36       Imaging History    2022  Date Procedure Name Status Accession Number Location   05/07/22 11:14 AM X-Ray Chest 1 View Final [id number] [facility]   05/05/22 06:46 PM US Abdomen Limited Final [id number] [facility]   05/04/22 02:32 PM CT Abdomen Pelvis With Contrast Final [id number] [facility]   05/02/22 05:40 PM X-Ray Abdomen AP 1 View Final [id number] [facility]   05/02/22 05:40 PM X-Ray Chest AP Portable Final [id number] [facility]   04/21/22 02:45 PM X-Ray Chest 1 View S/P PICC Line by Nursing Final [id number] [facility]   04/21/22 01:02 PM CT ABDOMEN PELVIS WITH CONTRAST Final [id number] [facility]

## 2022-05-11 NOTE — NURSING
Attempted to continue with ostomy lessons and demonstration. Patient awake and alert in bed but decline lessons this morning due to being nauseated. Ostomy assessed. No output noted in pouch. This writer asked patient was she ambulating with therapy in order to increase her strength and to stimulate her bowels? Patient replied I walked once yesterday with therapy but I haven't today. Patient encouraged to ambulate with therapy and also to try to begin eating to help stimulate her bowels. Patient verbalized understanding. Will continue with lessons 5/12/2022.

## 2022-05-11 NOTE — ASSESSMENT & PLAN NOTE
· LUQ, N/V for past few months, worsened over the last few days   · US with cholelithiasis, gallbladder wall thickening and pericholecystic fluid.  No sonographic Rasmussen's sign or wall hyperemia.    · HIDA ordered to determine if her cystic duct in patent  · S/p cholecystectomy with general surgery and ex-lap with ostomy creation with CRS on 5/9

## 2022-05-11 NOTE — PROGRESS NOTES
[facility]  Infectious Disease  Progress Note    Patient Name: Odette Hart  MRN: [id number]  Admission Date: 5/6/2022  Length of Stay: 2 days  Attending Physician: Rafa Cardozo MD  Primary Care Provider: Braxton Barragan MD    Isolation Status: Contact  Assessment/Plan:      Urinary tract infection with pyuria  65 yo female with Hx of colovaginal/vesico fistula, abscess, secondary to diverticulitis and now cholecystitis s/p cholecystectomy, appendectomy, fistula repair, and sigmoid colectomy 5/9 found to have VRE in urine.  Already on flagyl and cefepime.  No OR cultures done.  ID consulted for VRE in urine.  Concern that abscess could have had VRE as well and possible yeast given intrabdominal;    Plan:  1. Continue cefepime and flagyl x 7 d from sx  2. zyvox for VRE and fluconazole for yeast x 7 days from start - discussed with ID pharmacist and felt ok given other current meds  3. Labs weekly  4. Rec quinones change ASAP as likely contaminated  5. Discussed with ID staff   6. Will sign off - reconsult as needed          Anticipated Disposition: tbd    Thank you for your consult. I will sign off. Please contact us if you have any additional questions.    TERESSA Ríos  Infectious Disease  [facility]    Subjective:     Principal Problem:Calculus of gallbladder with cholecystitis without biliary obstruction    HPI: Odette Hart is a 66 y.o. 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 Patient was at [facility] and has been complaining of constant abdominal pain, poor PO intake, and N/V. She has been having abdominal pain, nausea/vomiting, and inability to tolerate PO since she was diagnosed with diverticulitis. They thought her symptoms were due to flagyl and it was discontinued with improvement, however  her symptoms returned days ago.     She had a CT of her abdomen on 05/04 which showed:  1. Improving sigmoid colon diverticulitis.  2. Interval decrease in size of the collection between the anterior rectum and posterior vaginal cuff with persistent colorectal fistulous tract.  Previously noted collection along the anterior border of the sigmoid colon and left lateral border of the bladder is not seen in today's exam.  Air does remain in the bladder suggesting persistent fistula.  3. Distended gallbladder with pericholecystic fluid.  Could be seen in acute cholecystitis in the appropriate setting.  Further evaluation with ultrasound is recommended.  4. Persistent intraluminal air within the urinary bladder could be related to known colovesical fistula or catheterization.    She has undergone sigmoid colectomy, cholecystectomy, colovagino/vesico fistula resection, washout, and appendectomy with colostomy placement on 5/9.  She has been on cefepime and flagyl.  Recent Ucx showed VRE faecium sensitive only to zyvox and nitrofurantoin.  ID consulted for abx recs.    Interval History:   No AEON.   Afebrile and WBC 3.34  Still with SOB  CXR: Stable predom perihilar interstitial opacities slightly improved vs prior.  The patient denies any recent fever, chills, or sweats.  CO abd pain and no appetite.  Denies flatus.  The patient denies any recent fever, chills, or sweats.      Review of Systems   Constitutional:  Negative for activity change, chills, diaphoresis and fever.   Respiratory:  Negative for cough, shortness of breath and wheezing.    Cardiovascular:  Negative for chest pain.   Gastrointestinal:  Positive for abdominal pain and nausea. Negative for constipation, diarrhea and vomiting.   Genitourinary:  Negative for decreased urine volume, difficulty urinating, dysuria, frequency and urgency.   Musculoskeletal:  Negative for back pain, gait problem and neck pain.   Neurological:  Negative for dizziness, weakness  and numbness.   Hematological:  Does not bruise/bleed easily.   Objective:     Vital Signs (Most Recent):  Temp: 98.6 °F (37 °C) (05/11/22 0727)  Pulse: 82 (05/11/22 0727)  Resp: 18 (05/11/22 0727)  BP: (!) 106/53 (05/11/22 0727)  SpO2: (!) 93 % (05/11/22 0727)   Vital Signs (24h Range):  Temp:  [97.8 °F (36.6 °C)-98.7 °F (37.1 °C)] 98.6 °F (37 °C)  Pulse:  [67-91] 82  Resp:  [16-20] 18  SpO2:  [93 %-97 %] 93 %  BP: ()/(44-58) 106/53     Weight: 59.8 kg (131 lb 13.4 oz)  Body mass index is 23.35 kg/m².    Estimated Creatinine Clearance: 66.3 mL/min (based on SCr of 0.7 mg/dL).    Physical Exam  Constitutional:       General: She is not in acute distress.     Appearance: She is well-developed and underweight. She is ill-appearing. She is not diaphoretic.       HENT:      Head: Normocephalic and atraumatic.      Mouth/Throat:      Mouth: No oral lesions.      Pharynx: Uvula midline.   Eyes:      General: No scleral icterus.     Pupils: Pupils are equal, round, and reactive to light.   Cardiovascular:      Rate and Rhythm: Normal rate and regular rhythm.      Heart sounds: Normal heart sounds. No murmur heard.    No friction rub. No gallop.   Pulmonary:      Effort: Pulmonary effort is normal. No respiratory distress.      Breath sounds: Normal breath sounds. No stridor. No wheezing, rhonchi or rales.   Abdominal:      General: There is no distension.      Palpations: Abdomen is soft. There is no mass.      Tenderness: There is abdominal tenderness. There is no guarding or rebound.   Skin:     General: Skin is warm and dry.      Findings: No rash.   Neurological:      Mental Status: She is alert and oriented to person, place, and time.   Psychiatric:         Behavior: Behavior normal.                 Significant Labs: Blood Culture:   Recent Labs   Lab 02/25/22  1710 03/15/22  1254   LABBLOO No growth after 5 days.  No growth after 5 days. No growth after 5 days.     CBC:   Recent Labs   Lab 05/10/22  0508   WBC  3.34*   HGB 11.6*   HCT 37.9        CMP:   Recent Labs   Lab 05/10/22  0508 05/11/22  0420    131*   K 4.8 3.2*    91*   CO2 32* 31*   * 81   BUN 20 18   CREATININE 0.7 0.7   CALCIUM 8.6* 8.7   PROT 6.7  --    ALBUMIN 2.1*  --    BILITOT 1.1*  --    ALKPHOS 73  --    AST 44*  --    ALT 29  --    ANIONGAP 6* 9   EGFRNONAA >60.0 >60.0     All pertinent labs within the past 24 hours have been reviewed.    Significant Imaging: I have reviewed all pertinent imaging results/findings within the past 24 hours.  X-Ray Chest AP Portable [[id number]] Resulted: 05/11/22 1018   Order Status: Completed Updated: 05/11/22 1021   Narrative:     EXAMINATION:   XR CHEST AP PORTABLE     CLINICAL HISTORY:   eval for pulm edema;.     TECHNIQUE:   Single frontal portable view of the chest was performed.     COMPARISON:   Radiograph one view 05/07/2022     FINDINGS:   Dual lead pacer defibrillator device with leads terminating in the right heart.     Right-sided PICC line with tip terminating at the SVC.     Lungs are symmetrically expanded with patchy opacities, primarily in a perihilar distribution, slightly improved compared to prior.  No pneumothorax or significant volume pleural fluid.     The cardiac silhouette is stable in appearance.  Prominence of the perihilar vasculature.     Osseous structures demonstrate degenerative changes.  No acute abnormality.    Impression:       Stable predominantly perihilar interstitial opacities, slightly improved compared to prior study.     Electronically signed by resident: Magdalena Navarrete   Date: 05/11/2022   Time: 10:12     Electronically signed by: Denis Sepulveda MD   Date: 05/11/2022   Time: 10:18   X-Ray Chest 1 View [[id number]] Resulted: 05/07/22 1136   Order Status: Completed Updated: 05/07/22 1138   Narrative:     EXAMINATION:   XR CHEST 1 VIEW     CLINICAL HISTORY:   hypoxia;     TECHNIQUE:   Single frontal view of the chest was performed.     COMPARISON:    Radiograph 05/02/2022, 04/21/2022, 02/25/2022 .     FINDINGS:   Left chest wall cardiac ICD in place.  Right upper extremity PICC line tip projects over the SVC.     Cardiomediastinal silhouette is stable.  Coronary artery stent versus vascular calcification, similar to prior.  Mediastinal structures are midline.  Lungs are symmetrically expanded.  Prominent interstitial opacities throughout both lungs, similar to slightly more pronounced when compared to the most recent prior radiograph.  No focal consolidation, pneumothorax, or significant pleural effusion.    Impression:       Interstitial opacities throughout both lungs which may reflect edema or interstitial pneumonia.       Electronically signed by: Rudolph Cotton   Date: 05/07/2022   Time: 11:36       Imaging History    2022  Date Procedure Name Status Accession Number Location   05/11/22 09:58 AM X-Ray Chest AP Portable Final [id number] [facility]   05/07/22 11:14 AM X-Ray Chest 1 View Final [id number] [facility]   05/05/22 06:46 PM US Abdomen Limited Final [id number] [facility]   05/04/22 02:32 PM CT Abdomen Pelvis With Contrast Final [id number] [facility]   05/02/22 05:40 PM X-Ray Abdomen AP 1 View Final [id number] [facility]   05/02/22 05:40 PM X-Ray Chest AP Portable Final [id number] [facility]   04/21/22 02:45 PM X-Ray Chest 1 View S/P PICC Line by Nursing Final [id number] [facility]   04/21/22 01:02 PM CT ABDOMEN PELVIS WITH CONTRAST Final [id number] [facility]

## 2022-05-11 NOTE — PT/OT/SLP PROGRESS
Physical Therapy  Pt Not Seen    Patient Name:  Odette Hart   MRN:  [id number]    3:12 pm  Patient not seen today secondary to  Pain (Pt declines getting OOB this date with c/o 10/10 abdominal pain level.  Nurse made aware). Will follow-up on next scheduled visit.    Vera Brady, PTA  5/11/2022

## 2022-05-11 NOTE — SUBJECTIVE & OBJECTIVE
Subjective:     Interval History: no acute events overnite, afraid to eat, adequate pain control, not wanting to eat or ambulate    Post-Op Info:  Procedure(s) (LRB):  COLECTOMY, SIGMOID (ERAS High, lithotomy) (N/A)  COLONOSCOPY (N/A)  CYSTOSCOPY,WITH URETERAL CATHETER INSERTION (Bilateral)  CHOLECYSTECTOMY (N/A)  APPENDECTOMY (N/A)  EXCISION, CECUM (N/A)  CREATION, COLOSTOMY   2 Days Post-Op      Medications:  Continuous Infusions:  Scheduled Meds:   acetaminophen  1,000 mg Oral Q8H    aspirin  81 mg Oral Daily    carvediloL  12.5 mg Oral BID WM    ceFEPime (MAXIPIME) IVPB  2 g Intravenous Q8H    enoxaparin  40 mg Subcutaneous Daily    ezetimibe  10 mg Oral QHS    fluconazole (DIFLUCAN) IV (PEDS and ADULTS)  400 mg Intravenous Q24H    linezolid  600 mg Intravenous Q12H    metronidazole  500 mg Intravenous Q8H    mupirocin   Nasal BID    ondansetron  4 mg Intravenous QID    pantoprazole  40 mg Oral Daily    scopolamine  1 patch Transdermal Q3 Days     PRN Meds:   albuterol-ipratropium    ALPRAZolam    aluminum-magnesium hydroxide-simethicone    dextrose 10%    dextrose 10%    glucagon (human recombinant)    glucose    glucose    HYDROmorphone    insulin aspart U-100    ondansetron    oxyCODONE    oxyCODONE    sodium chloride 0.9%    sodium chloride 0.9%        Objective:     Vital Signs (Most Recent):  Temp: 98.6 °F (37 °C) (05/11/22 1145)  Pulse: 88 (05/11/22 1215)  Resp: 18 (05/11/22 1215)  BP: (!) 105/56 (05/11/22 1145)  SpO2: 96 % (05/11/22 1215)   Vital Signs (24h Range):  Temp:  [98 °F (36.7 °C)-98.7 °F (37.1 °C)] 98.6 °F (37 °C)  Pulse:  [67-91] 88  Resp:  [16-20] 18  SpO2:  [93 %-97 %] 96 %  BP: ()/(44-58) 105/56     Intake/Output - Last 3 Shifts         05/09 0700  05/10 0659 05/10 0700 05/11 0659 05/11 0700 05/12 0659    IV Piggyback 1591.7      Total Intake(mL/kg) 1591.7 (26.6)      Urine (mL/kg/hr) 665 (0.5) 3050 (2.1) 300 (0.8)    Drains 90 40     Stool 0 0     Blood 150      Total Output 905  3090 300    Net +686.7 -3090 -300           Urine Occurrence  1 x     Stool Occurrence 0 x 0 x             Physical Exam  Vitals and nursing note reviewed.   Constitutional:       Appearance: She is well-developed. She is not ill-appearing.   HENT:      Head: Normocephalic.   Eyes:      Pupils: Pupils are equal, round, and reactive to light.   Cardiovascular:      Rate and Rhythm: Normal rate and regular rhythm.      Heart sounds: Normal heart sounds.   Pulmonary:      Effort: Pulmonary effort is normal. No respiratory distress.      Breath sounds: Normal breath sounds. No wheezing or rales.   Abdominal:      Palpations: Abdomen is soft. There is no mass.      Tenderness: There is no guarding or rebound.      Comments: Abd inc line healing well  Colostomy liquid stool   Skin:     General: Skin is warm and dry.   Neurological:      Mental Status: She is alert and oriented to person, place, and time.   Psychiatric:         Behavior: Behavior normal.         Thought Content: Thought content normal.         Judgment: Judgment normal.           Significant Labs:  BMP (Last 3 Results):   Recent Labs   Lab 05/07/22  0608 05/08/22  0530 05/09/22  0416 05/09/22  1214 05/10/22  0508 05/11/22  0420   GLU 75 99 83 138* 134* 81    133* 140 138 138 131*   K 3.9 3.7 3.5 3.1* 4.8 3.2*   CL 99 94* 97 102 100 91*   CO2 31* 30* 35* 27 32* 31*   BUN 21 20 12 13 20 18   CREATININE 0.6 0.7 0.6 0.6 0.7 0.7   CALCIUM 8.6* 8.4* 8.5* 7.8* 8.6* 8.7   MG 1.7 1.6 1.7  --   --   --      CBC (Last 3 Results):   Recent Labs   Lab 05/09/22  0416 05/09/22  1214 05/10/22  0508   WBC 2.98* 1.49* 3.34*   RBC 4.17 3.99* 4.22   HGB 11.5* 11.0* 11.6*   HCT 36.5* 35.4* 37.9    263 222   MCV 88 89 90   MCH 27.6 27.6 27.5   MCHC 31.5* 31.1* 30.6*       Significant Diagnostics:  None

## 2022-05-11 NOTE — ASSESSMENT & PLAN NOTE
65 yo female with Hx of colovaginal/vesico fistula, abscess, secondary to diverticulitis and now cholecystitis s/p cholecystectomy, appendectomy, fistula repair, and sigmoid colectomy 5/9 found to have VRE in urine.  Already on flagyl and cefepime.  No OR cultures done.  ID consulted for VRE in urine.  Concern that abscess could have had VRE as well and possible yeast given intrabdominal;    Plan:  1. Continue cefepime and flagyl  2. Add zyvox for VRE and fluconazole for yeast - discussed with ID pharmacist and felt ok given other current meds  3. Plan fo 5-7 days  4. Discussed with ID staff - will follow.

## 2022-05-12 PROBLEM — R42 DIZZINESS: Status: ACTIVE | Noted: 2022-01-01

## 2022-05-12 NOTE — ASSESSMENT & PLAN NOTE
Chronic, controlled.  Latest blood pressure and vitals reviewed-   Temp:  [97.5 °F (36.4 °C)-99.1 °F (37.3 °C)]   Pulse:  []   Resp:  [16-20]   BP: (102-118)/(52-59)   SpO2:  [94 %-97 %] .   Home meds for hypertension were reviewed and noted below.   Hypertension Medications             carvediloL (COREG) 12.5 MG tablet Take 1 tablet (12.5 mg total) by mouth 2 (two) times daily with meals.    nitroGLYCERIN (NITROSTAT) 0.4 MG SL tablet Place 1 tablet (0.4 mg total) under the tongue every 5 (five) minutes as needed for Chest pain.    spironolactone (ALDACTONE) 25 MG tablet Take 1 tablet (25 mg total) by mouth once daily.          While in the hospital, will manage blood pressure as follows; Continue home antihypertensive regimen    Will utilize p.r.n. blood pressure medication only if patient's blood pressure greater than  180/110 and she develops symptoms such as worsening chest pain or shortness of breath.

## 2022-05-12 NOTE — ASSESSMENT & PLAN NOTE
Nutrition consulted. Most recent weight and BMI monitored-     Malnutrition Type and Energy Intake  Malnutrition Type: chronic illness  Energy Intake: moderate energy intake    Malnutrition (Moderate to Severe)  Energy Intake (Malnutrition): less than 75% for greater than or equal to 1 month  Enc diet and boost            Measurements:  Wt Readings from Last 1 Encounters:   05/09/22 59.8 kg (131 lb 13.4 oz)   Body mass index is 23.35 kg/m².    Recommendations: Recommendation/Intervention: 1.  Goals: Pt to meet >75% EEN/EPN by RD follow up    Patient has been screened and assessed by RD. RD will follow patient.

## 2022-05-12 NOTE — PROGRESS NOTES
[facility]  Colorectal Surgery  Progress Note    Patient Name: Odette Hart  MRN: [id number]  Admission Date: 5/6/2022  Hospital Length of Stay: 3 days  Attending Physician: Rafa Cardozo MD    Subjective:     Interval History: no acute events overnite, trying to eat a little more, adequate pain control, nausea at times but no vomiting    Post-Op Info:  Procedure(s) (LRB):  COLECTOMY, SIGMOID (ERAS High, lithotomy) (N/A)  COLONOSCOPY (N/A)  CYSTOSCOPY,WITH URETERAL CATHETER INSERTION (Bilateral)  CHOLECYSTECTOMY (N/A)  APPENDECTOMY (N/A)  EXCISION, CECUM (N/A)  CREATION, COLOSTOMY   3 Days Post-Op      Medications:  Continuous Infusions:  Scheduled Meds:   acetaminophen  1,000 mg Oral Q8H    aspirin  81 mg Oral Daily    carvediloL  12.5 mg Oral BID WM    ceFEPime (MAXIPIME) IVPB  2 g Intravenous Q8H    enoxaparin  40 mg Subcutaneous Daily    ezetimibe  10 mg Oral QHS    fluconazole (DIFLUCAN) IV (PEDS and ADULTS)  400 mg Intravenous Q24H    linezolid  600 mg Intravenous Q12H    metronidazole  500 mg Intravenous Q8H    mupirocin   Nasal BID    ondansetron  4 mg Intravenous QID    pantoprazole  40 mg Oral Daily    scopolamine  1 patch Transdermal Q3 Days     PRN Meds:   albuterol-ipratropium    ALPRAZolam    aluminum-magnesium hydroxide-simethicone    dextrose 10%    dextrose 10%    glucagon (human recombinant)    glucose    glucose    HYDROmorphone    insulin aspart U-100    ondansetron    oxyCODONE    oxyCODONE    sodium chloride 0.9%    sodium chloride 0.9%        Objective:     Vital Signs (Most Recent):  Temp: 98.1 °F (36.7 °C) (05/12/22 0721)  Pulse: 77 (05/12/22 0847)  Resp: 16 (05/12/22 0722)  BP: (!) 103/57 (05/12/22 0847)  SpO2: 95 % (05/12/22 0721)   Vital Signs (24h Range):  Temp:  [97.5 °F (36.4 °C)-99.1 °F (37.3 °C)] 98.1 °F (36.7 °C)  Pulse:  [] 77  Resp:  [16-20] 16  SpO2:  [94 %-97 %] 95 %  BP: (102-118)/(52-59) 103/57     Intake/Output - Last 3 Shifts          05/10 0700 05/11 0659 05/11 0700 05/12 0659 05/12 0700 05/13 0659    P.O.  520     IV Piggyback       Total Intake(mL/kg)  520 (8.7)     Urine (mL/kg/hr) 3050 (2.1) 2050 (1.4)     Drains 40 25     Stool 0 0     Blood       Total Output 3090 2075     Net -3090 -1555            Urine Occurrence 1 x 1 x     Stool Occurrence 0 x              Physical Exam  Vitals and nursing note reviewed.   Constitutional:       Appearance: She is well-developed. She is not ill-appearing.   HENT:      Head: Normocephalic.   Eyes:      Pupils: Pupils are equal, round, and reactive to light.   Cardiovascular:      Rate and Rhythm: Normal rate and regular rhythm.      Heart sounds: Normal heart sounds.   Pulmonary:      Effort: Pulmonary effort is normal. No respiratory distress.      Breath sounds: Normal breath sounds. No wheezing or rales.   Abdominal:      Palpations: Abdomen is soft. There is no mass.      Tenderness: There is no guarding or rebound.      Comments: Abd inc line healing well  Colostomy small amt of output  PRASAD serous drainage     Skin:     General: Skin is warm and dry.   Neurological:      Mental Status: She is alert and oriented to person, place, and time.   Psychiatric:         Behavior: Behavior normal.         Thought Content: Thought content normal.         Judgment: Judgment normal.           Significant Labs:  BMP (Last 3 Results):   Recent Labs   Lab 05/07/22  0608 05/08/22  0530 05/09/22  0416 05/09/22  1214 05/10/22  0508 05/11/22  0420 05/12/22  0354   GLU 75 99 83   < > 134* 81 78    133* 140   < > 138 131* 131*   K 3.9 3.7 3.5   < > 4.8 3.2* 3.6   CL 99 94* 97   < > 100 91* 91*   CO2 31* 30* 35*   < > 32* 31* 33*   BUN 21 20 12   < > 20 18 15   CREATININE 0.6 0.7 0.6   < > 0.7 0.7 0.6   CALCIUM 8.6* 8.4* 8.5*   < > 8.6* 8.7 8.6*   MG 1.7 1.6 1.7  --   --   --   --     < > = values in this interval not displayed.     CBC (Last 3 Results):   Recent Labs   Lab 05/09/22  0416 05/09/22  1214  05/10/22  0508   WBC 2.98* 1.49* 3.34*   RBC 4.17 3.99* 4.22   HGB 11.5* 11.0* 11.6*   HCT 36.5* 35.4* 37.9    263 222   MCV 88 89 90   MCH 27.6 27.6 27.5   MCHC 31.5* 31.1* 30.6*       Significant Diagnostics:  None    Assessment/Plan:     Acute cholecystitis  OR 5/9    Severe malnutrition  Nutrition consulted. Most recent weight and BMI monitored-     Malnutrition Type and Energy Intake  Malnutrition Type: chronic illness  Energy Intake: moderate energy intake    Malnutrition (Moderate to Severe)  Energy Intake (Malnutrition): less than 75% for greater than or equal to 1 month  Enc diet and boost            Measurements:  Wt Readings from Last 1 Encounters:   05/09/22 59.8 kg (131 lb 13.4 oz)   Body mass index is 23.35 kg/m².    Recommendations: Recommendation/Intervention: 1.  Goals: Pt to meet >75% EEN/EPN by RD follow up    Patient has been screened and assessed by RD. RD will follow patient.      Urinary tract infection with pyuria  Consult to ID for hx of UTI with VRE, continuing antibiotics until seen by ID  Appreciate ID input, continue antibiotics 5-7 days post op    Colovesical fistula  Repaired during surgery 5/9  Cardona in place    Chronic combined systolic and diastolic congestive heart failure  Patient is identified as having Combined Systolic and Diastolic heart failure that is Chronic. CHF is currently controlled. Latest ECHO performed and demonstrates- Results for orders placed during the hospital encounter of 01/22/22    Echo Saline Bubble? No    Interpretation Summary  · The left ventricle is severely enlarged with mild eccentric hypertrophy and severely decreased systolic function.  · There are segmental left ventricular wall motion abnormalities. The mid anteroseptal and anterior walls are akinetic. The apex is akinetic. The remainder of the left ventricular walls are severely hypokinetic.  · The estimated ejection fraction is 20%.  · Spontaneous echocardiographic contrast in the left  ventricle.  · Grade III left ventricular diastolic dysfunction.  · Severe left atrial enlargement.  · Normal right ventricular size with normal right ventricular systolic function.  · Mild right atrial enlargement.  · Mild aortic regurgitation.  · Mild-to-moderate mitral regurgitation.  · Mild tricuspid regurgitation.  · The estimated PA systolic pressure is 28 mmHg.  · Intermediate central venous pressure (8 mmHg).  · Pacemaker/ICD lead.  . Continue home meds and monitor clinical status closely. Monitor on telemetry.   Recent Labs   Lab 05/09/22  1214   BNP 2,792*   .      Primary hypertension  Chronic, controlled.  Latest blood pressure and vitals reviewed-   Temp:  [97.5 °F (36.4 °C)-99.1 °F (37.3 °C)]   Pulse:  []   Resp:  [16-20]   BP: (102-118)/(52-59)   SpO2:  [94 %-97 %] .   Home meds for hypertension were reviewed and noted below.   Hypertension Medications             carvediloL (COREG) 12.5 MG tablet Take 1 tablet (12.5 mg total) by mouth 2 (two) times daily with meals.    nitroGLYCERIN (NITROSTAT) 0.4 MG SL tablet Place 1 tablet (0.4 mg total) under the tongue every 5 (five) minutes as needed for Chest pain.    spironolactone (ALDACTONE) 25 MG tablet Take 1 tablet (25 mg total) by mouth once daily.          While in the hospital, will manage blood pressure as follows; Continue home antihypertensive regimen    Will utilize p.r.n. blood pressure medication only if patient's blood pressure greater than  180/110 and she develops symptoms such as worsening chest pain or shortness of breath.    Coronary artery disease of native artery of native heart with stable angina pectoris  Patient with known CAD s/p stent placement and CABG, which is controlled   Tele  Resume home meds    Acute diverticulitis  OR 5/9 sigmoid colectomy, end colostomy, takedown of fistuli, and cholecystectomy    -await return of bowel function, lfd  -continue multi modality for pain control, dc pca and begin oral jpain  meds  -encourage ambulation and use of IS  -phani hose and SCD  -prophalactic lovenox and PPI  -quinones in place for 8-10 days    Automatic implantable cardioverter-defibrillator in situ  Cont tele  Cont home meds  Monitor vs          Carola Buckley NP  Colorectal Surgery  [facility]

## 2022-05-12 NOTE — PROGRESS NOTES
[facility] Medicine  Progress Note    Patient Name: Odette Hart  MRN: [id number]  Patient Class: IP- Inpatient   Admission Date: 5/6/2022  Length of Stay: 3 days  Attending Physician: Rafa Cardozo MD  Primary Care Provider: Braxton Barragan MD        Subjective:     Principal Problem:Calculus of gallbladder with cholecystitis without biliary obstruction        HPI:  Odette Hart is a 66 y.o. 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 Patient was at [facility] and has been complaining of constant LUQ abdominal pain, poor PO intake, and N/V. She has been having abdominal pain, nausea/vomiting, and inability to tolerate PO since she was diagnosed with diverticulitis. They thought her symptoms were due to flagyl and it was discontinued with improvement, however her symptoms returned 10 days ago. She had a CT of her abdomen on 05/04 which showed improving diverticulitis, persistent colovesicular fistula and distended gallbladder with pericholecystic fluid. She denies any fevers, chills, chest pain, dysuria.    General surgery consulted in ED and recommend HIDA.        Overview/Hospital Course:  Ms. Hart was admitted to Hospital Medicine for management of acute cholecystitis.  Gen Surg was consulted and HIDA was ordered.  CRS was consulted who plan for corrective surgery of her colovesical fistula with ostomy placement on 5/9 with combined beatriz. Transferred to CRS service,  consulted for continued care in setting of ADHF. Started on diuresis with lasix 40mg IV BID, symptoms and oxygen requirements now much improved. Lasix weaned to 40mg IV daily. Repeat echo and BNP pending       Interval History: complaining of some nausea and abd pain this AM. Also reports dizziness when standing/ambulating. Will recheck  cbc. Also restarting lasix 40mg IV qd, recheck echo and bnp    Review of Systems   Constitutional:  Negative for chills, fatigue and fever.   HENT:  Negative for congestion and rhinorrhea.    Eyes:  Negative for visual disturbance.   Respiratory:  Positive for shortness of breath. Negative for cough.    Cardiovascular:  Negative for chest pain, palpitations and leg swelling.   Gastrointestinal:  Negative for abdominal pain, diarrhea, nausea and vomiting.   Genitourinary:  Negative for dysuria and urgency.   Musculoskeletal:  Negative for back pain.   Skin:  Negative for rash.   Neurological:  Positive for light-headedness. Negative for dizziness, syncope, weakness and headaches.   Psychiatric/Behavioral:  Negative for agitation and confusion.    All other systems reviewed and are negative.  Objective:     Vital Signs (Most Recent):  Temp: 98.1 °F (36.7 °C) (05/12/22 0721)  Pulse: 68 (05/12/22 1211)  Resp: 18 (05/12/22 1132)  BP: (!) 93/55 (05/12/22 1211)  SpO2: 99 % (05/12/22 1132)   Vital Signs (24h Range):  Temp:  [97.5 °F (36.4 °C)-99.1 °F (37.3 °C)] 98.1 °F (36.7 °C)  Pulse:  [] 68  Resp:  [16-20] 18  SpO2:  [94 %-99 %] 99 %  BP: ()/(46-59) 93/55     Weight: 59.8 kg (131 lb 13.4 oz)  Body mass index is 23.35 kg/m².    Intake/Output Summary (Last 24 hours) at 5/12/2022 1352  Last data filed at 5/12/2022 0500  Gross per 24 hour   Intake 290 ml   Output 1625 ml   Net -1335 ml      Physical Exam  Constitutional:       Appearance: Normal appearance. She is well-developed.   HENT:      Head: Normocephalic and atraumatic.      Mouth/Throat:      Mouth: Mucous membranes are moist.   Eyes:      Conjunctiva/sclera: Conjunctivae normal.   Neck:      Comments: No JVD  Cardiovascular:      Rate and Rhythm: Normal rate and regular rhythm.      Heart sounds: No murmur heard.  Pulmonary:      Effort: Pulmonary effort is normal. No respiratory distress.      Breath sounds: Normal breath sounds. No wheezing or rales.       Comments: On 0.5L NC  Abdominal:      General: There is no distension.      Palpations: Abdomen is soft.      Tenderness: There is abdominal tenderness.      Comments: Colostomy bag with minimal stool and gas, incision site c/d/i   Musculoskeletal:      Right lower leg: No edema.      Left lower leg: No edema.      Comments: PICC in RUE   Skin:     General: Skin is warm.   Neurological:      General: No focal deficit present.      Mental Status: She is alert and oriented to person, place, and time.   Psychiatric:      Comments: Appears tired       Significant Labs: All pertinent labs within the past 24 hours have been reviewed.  CBC: No results for input(s): WBC, HGB, HCT, PLT in the last 48 hours.  CMP:   Recent Labs   Lab 05/11/22  0420 05/12/22  0354   * 131*   K 3.2* 3.6   CL 91* 91*   CO2 31* 33*   GLU 81 78   BUN 18 15   CREATININE 0.7 0.6   CALCIUM 8.7 8.6*   ANIONGAP 9 7*   EGFRNONAA >60.0 >60.0       Significant Imaging: I have reviewed all pertinent imaging results/findings within the past 24 hours.      Assessment/Plan:        * Calculus of gallbladder with cholecystitis without biliary obstruction  Colovesical fistula  Acute diverticulitis  · LUQ, N/V for past few months, worsened over the last few days   · US with cholelithiasis, gallbladder wall thickening and pericholecystic fluid.  No sonographic Rasmussen's sign or wall hyperemia.    · HIDA ordered to determine if her cystic duct in patent  · S/p cholecystectomy with general surgery and ex-lap with ostomy creation with CRS on 5/9  · Continue Cefepime and Flagyl      Acute decompensated heart failure  · Combined HF with EF 20%  · Hypoxic requiring 2L on 5/7  · Radiographic evidence of pulmonary edema  · BNP ~ 3000  · Continue Coreg 12.5mg BID  · Transitioned to Lasix 40 mg IV BID on 5/9 (uses only prn at home).  · Na trended down, symptoms and oxygen requirements improved. Restarting lasix 40mg IV qd.   · Repeat TTE and bnp pending   · Low  sodium diet with 1.5 L fluid restriction  · Strict I&Os, daily weights      Dizziness  - reports dizziness on exertion/standing. Possibly related to Bps or multifactorial given acute illness and acute decompensated systolic heart failure   - will repeat CBC to check for anemia          Urinary tract infection with pyuria  · In April - Pseudomonas / Enterobacteroid acute cystitis without hematuria  · Urine cx positive for MDR VRE. ID following, continue  Cefepime, linezolid, and Flagyl        Anemia  · Stable.   · Blood smear notable for Rouleaux formation        Acute cholecystitis  · As above     Severe malnutrition  · On TPN for chronic diverticulitis  · Nutrition consult        Primary hypertension  · Chronic and stable  · Continue Coreg 12.5mg BID     Familial hypercholesterolemia  Continue Zetia  Might need Niacin       Coronary artery disease of native artery of native heart with stable angina pectoris  · Chronic and stable  · Continue Aspirin 81mg PO daily  · Continue Coreg 12.5mg PO BID  · Prasugrel held on admission for surgery. Resume when safe from surgical standpoint and f/u her cardiologist outpatient     Automatic implantable cardioverter-defibrillator in situ  · Chronic and stable  · No acute issues      VTE Risk Mitigation (From admission, onward)         Ordered     enoxaparin injection 40 mg  Daily         05/06/22 2358     IP VTE HIGH RISK PATIENT  Once         05/06/22 2358     Place sequential compression device  Until discontinued         05/06/22 2358                        Evens Talley MD  Department of Hospital Medicine   [facility]

## 2022-05-12 NOTE — SUBJECTIVE & OBJECTIVE
Interval History: complaining of some nausea and abd pain this AM. Also reports dizziness when standing/ambulating. Will recheck cbc. Also restarting lasix 40mg IV qd, recheck echo and bnp    Review of Systems   Constitutional:  Negative for chills, fatigue and fever.   HENT:  Negative for congestion and rhinorrhea.    Eyes:  Negative for visual disturbance.   Respiratory:  Positive for shortness of breath. Negative for cough.    Cardiovascular:  Negative for chest pain, palpitations and leg swelling.   Gastrointestinal:  Negative for abdominal pain, diarrhea, nausea and vomiting.   Genitourinary:  Negative for dysuria and urgency.   Musculoskeletal:  Negative for back pain.   Skin:  Negative for rash.   Neurological:  Positive for light-headedness. Negative for dizziness, syncope, weakness and headaches.   Psychiatric/Behavioral:  Negative for agitation and confusion.    All other systems reviewed and are negative.  Objective:     Vital Signs (Most Recent):  Temp: 98.1 °F (36.7 °C) (05/12/22 0721)  Pulse: 68 (05/12/22 1211)  Resp: 18 (05/12/22 1132)  BP: (!) 93/55 (05/12/22 1211)  SpO2: 99 % (05/12/22 1132)   Vital Signs (24h Range):  Temp:  [97.5 °F (36.4 °C)-99.1 °F (37.3 °C)] 98.1 °F (36.7 °C)  Pulse:  [] 68  Resp:  [16-20] 18  SpO2:  [94 %-99 %] 99 %  BP: ()/(46-59) 93/55     Weight: 59.8 kg (131 lb 13.4 oz)  Body mass index is 23.35 kg/m².    Intake/Output Summary (Last 24 hours) at 5/12/2022 1352  Last data filed at 5/12/2022 0500  Gross per 24 hour   Intake 290 ml   Output 1625 ml   Net -1335 ml      Physical Exam  Constitutional:       Appearance: Normal appearance. She is well-developed.   HENT:      Head: Normocephalic and atraumatic.      Mouth/Throat:      Mouth: Mucous membranes are moist.   Eyes:      Conjunctiva/sclera: Conjunctivae normal.   Neck:      Comments: No JVD  Cardiovascular:      Rate and Rhythm: Normal rate and regular rhythm.      Heart sounds: No murmur heard.  Pulmonary:       Effort: Pulmonary effort is normal. No respiratory distress.      Breath sounds: Normal breath sounds. No wheezing or rales.      Comments: On 0.5L NC  Abdominal:      General: There is no distension.      Palpations: Abdomen is soft.      Tenderness: There is abdominal tenderness.      Comments: Colostomy bag with minimal stool and gas, incision site c/d/i   Musculoskeletal:      Right lower leg: No edema.      Left lower leg: No edema.      Comments: PICC in RUE   Skin:     General: Skin is warm.   Neurological:      General: No focal deficit present.      Mental Status: She is alert and oriented to person, place, and time.   Psychiatric:      Comments: Appears tired       Significant Labs: All pertinent labs within the past 24 hours have been reviewed.  CBC: No results for input(s): WBC, HGB, HCT, PLT in the last 48 hours.  CMP:   Recent Labs   Lab 05/11/22  0420 05/12/22  0354   * 131*   K 3.2* 3.6   CL 91* 91*   CO2 31* 33*   GLU 81 78   BUN 18 15   CREATININE 0.7 0.6   CALCIUM 8.7 8.6*   ANIONGAP 9 7*   EGFRNONAA >60.0 >60.0       Significant Imaging: I have reviewed all pertinent imaging results/findings within the past 24 hours.

## 2022-05-12 NOTE — ASSESSMENT & PLAN NOTE
· Combined HF with EF 20%  · Hypoxic requiring 2L on 5/7  · Radiographic evidence of pulmonary edema  · BNP ~ 3000  · Continue Coreg 12.5mg BID  · Transitioned to Lasix 40 mg IV BID on 5/9 (uses only prn at home).  · Na trended down, symptoms and oxygen requirements improved. Restarting lasix 40mg IV qd.   · Repeat TTE and bnp pending   · Low sodium diet with 1.5 L fluid restriction  · Strict I&Os, daily weights

## 2022-05-12 NOTE — PROGRESS NOTES
[facility]  Wound Care    Patient Name:  Odette Hart   MRN:  [id number]  Date: 2022  Diagnosis: Calculus of gallbladder with cholecystitis without biliary obstruction    History:     Past Medical History:   Diagnosis Date    Acute coronary syndrome     Allergy     Arthritis     Cardiomyopathy     CHF (congestive heart failure)     Coronary artery disease     Coronary artery disease of native artery of native heart with stable angina pectoris 2021: [facility]: Cath: LAD: Proximal stent patent. LCX: Proximal 80%. LCX: Dominant. Moderate disease. LCX: HEVER 2.75 x 18 mm. Distal dissection. HEVER 2.75 x 22 mm.     Diverticulitis     Diverticulosis     Familial hypercholesterolemia 2022    Former smoker 2022    Heart attack     Heart disease     History of myocardial infarction 2022    Hyperlipidemia     Hypertension     Hypertension 2022    ICD (implantable cardioverter-defibrillator) in place     Non-ST elevation myocardial infarction (NSTEMI) 2019       Social History     Socioeconomic History    Marital status:    Tobacco Use    Smoking status: Former Smoker     Packs/day: 0.50     Start date: 1976     Quit date: 2012     Years since quittin.4    Smokeless tobacco: Never Used   Substance and Sexual Activity    Alcohol use: No    Drug use: No       Precautions:     Allergies as of 2022 - Reviewed 2022   Allergen Reaction Noted    Augmentin [amoxicillin-pot clavulanate] Swelling 2018    Lisinopril Other (See Comments) 2018    Losartan Other (See Comments) 2018    Shellfish containing products Anaphylaxis 09/10/2018    Levaquin [levofloxacin] Other (See Comments) 2018    Clindamycin Palpitations 2019       [facility] Assessment Details/Treatment        22 1145        Altered Skin Integrity 22 1511 Sacral spine   Date First Assessed/Time First Assessed: 22 1511   Altered  Skin Integrity Present on Admission: suspected hospital acquired  Location: Sacral spine   Wound Image    Description of Altered Skin Integrity Intact skin with non-blanchable redness of localized area   Dressing Appearance Open to air   Appearance Maroon;Red     Wound care consult received from RN for assessment of sacral redness with small portion of marooning. Upon assessment nonblanchable redness to sacrum with area dark maroon discoloration, skin intact at present. Etiology uinknown. Consult discussed with skin integrity NP Callie Hart.      Recommendations made to primary team for:  -Nursing to apply BPCO to sacrum BID/PRN  -Nursing to continue skin and pressure prevention interventions  RN and MD aware. Orders placed. Wound care signing off. Re-consult wound care as needed.    05/12/2022

## 2022-05-12 NOTE — PROGRESS NOTES
[facility]  Wound Care    Patient Name:  Odette Hart   MRN:  [id number]  Date: 2022  Diagnosis: Calculus of gallbladder with cholecystitis without biliary obstruction    History:     Past Medical History:   Diagnosis Date    Acute coronary syndrome     Allergy     Arthritis     Cardiomyopathy     CHF (congestive heart failure)     Coronary artery disease     Coronary artery disease of native artery of native heart with stable angina pectoris 2021: [facility]: Cath: LAD: Proximal stent patent. LCX: Proximal 80%. LCX: Dominant. Moderate disease. LCX: HEVER 2.75 x 18 mm. Distal dissection. HEVER 2.75 x 22 mm.     Diverticulitis     Diverticulosis     Familial hypercholesterolemia 2022    Former smoker 2022    Heart attack     Heart disease     History of myocardial infarction 2022    Hyperlipidemia     Hypertension     Hypertension 2022    ICD (implantable cardioverter-defibrillator) in place     Non-ST elevation myocardial infarction (NSTEMI) 2019       Social History     Socioeconomic History    Marital status:    Tobacco Use    Smoking status: Former Smoker     Packs/day: 0.50     Start date: 1976     Quit date: 2012     Years since quittin.4    Smokeless tobacco: Never Used   Substance and Sexual Activity    Alcohol use: No    Drug use: No       Precautions:     Allergies as of 2022 - Reviewed 2022   Allergen Reaction Noted    Augmentin [amoxicillin-pot clavulanate] Swelling 2018    Lisinopril Other (See Comments) 2018    Losartan Other (See Comments) 2018    Shellfish containing products Anaphylaxis 09/10/2018    Levaquin [levofloxacin] Other (See Comments) 2018    Clindamycin Palpitations 2019       [facility] Assessment Details/Treatment        22 1453        Colostomy 22 1100 Descending/sigmoid LLQ   Placement Date/Time: 22 1100   Inserted by: MD Watkins  Type: Descending/sigmoid  Location: LLQ   Wound Image    Stomal Appliance 1 piece   Stoma Appearance round;rosebud appearance;swollen;moist;red   Stoma Size (in) 30   Site Assessment Clean;Intact   Peristomal Assessment Clean;Intact   Accessories/Skin Care cleansed w/ water;skin sealant   Stoma Function no stool;no flatus;bowel sweat   Output (mL) 0 mL     Ostomy lesson #2 completed. Met with patient up in chair in room. Awake and alert. Discussed and demonstrated cutting ostomy pouch and emptying pouch of stool and flatus. No output noted in pouch, this writer asked patient has her stoma began producing any stool, patient replied, not yet. This writer continues to encourage patient to ambulate and increase po intake to stimulate her bowels. Patient verbalized understanding. Follow up lesson on 5/13/2022.          05/12/2022

## 2022-05-12 NOTE — PLAN OF CARE
POC reviewed with pt. Pt verbalized understanding. AAOX'4. VSS on RA. Pt tolerating small amounts of clear liquid diet. Pt did not want to eat her low fiber/low residue diet. C/o nausea treated with scheduled and PRN meds as per MD order.Midline incision CDI.RLQ PRASAD drain intact. LLQ colostomy intact with  no output.  Bed in lowest position with call light within reach. WCTM.

## 2022-05-12 NOTE — SUBJECTIVE & OBJECTIVE
Subjective:     Interval History: no acute events overnite, trying to eat a little more, adequate pain control, nausea at times but no vomiting    Post-Op Info:  Procedure(s) (LRB):  COLECTOMY, SIGMOID (ERAS High, lithotomy) (N/A)  COLONOSCOPY (N/A)  CYSTOSCOPY,WITH URETERAL CATHETER INSERTION (Bilateral)  CHOLECYSTECTOMY (N/A)  APPENDECTOMY (N/A)  EXCISION, CECUM (N/A)  CREATION, COLOSTOMY   3 Days Post-Op      Medications:  Continuous Infusions:  Scheduled Meds:   acetaminophen  1,000 mg Oral Q8H    aspirin  81 mg Oral Daily    carvediloL  12.5 mg Oral BID WM    ceFEPime (MAXIPIME) IVPB  2 g Intravenous Q8H    enoxaparin  40 mg Subcutaneous Daily    ezetimibe  10 mg Oral QHS    fluconazole (DIFLUCAN) IV (PEDS and ADULTS)  400 mg Intravenous Q24H    linezolid  600 mg Intravenous Q12H    metronidazole  500 mg Intravenous Q8H    mupirocin   Nasal BID    ondansetron  4 mg Intravenous QID    pantoprazole  40 mg Oral Daily    scopolamine  1 patch Transdermal Q3 Days     PRN Meds:   albuterol-ipratropium    ALPRAZolam    aluminum-magnesium hydroxide-simethicone    dextrose 10%    dextrose 10%    glucagon (human recombinant)    glucose    glucose    HYDROmorphone    insulin aspart U-100    ondansetron    oxyCODONE    oxyCODONE    sodium chloride 0.9%    sodium chloride 0.9%        Objective:     Vital Signs (Most Recent):  Temp: 98.1 °F (36.7 °C) (05/12/22 0721)  Pulse: 77 (05/12/22 0847)  Resp: 16 (05/12/22 0722)  BP: (!) 103/57 (05/12/22 0847)  SpO2: 95 % (05/12/22 0721)   Vital Signs (24h Range):  Temp:  [97.5 °F (36.4 °C)-99.1 °F (37.3 °C)] 98.1 °F (36.7 °C)  Pulse:  [] 77  Resp:  [16-20] 16  SpO2:  [94 %-97 %] 95 %  BP: (102-118)/(52-59) 103/57     Intake/Output - Last 3 Shifts         05/10 0700  05/11 0659 05/11 0700 05/12 0659 05/12 0700 05/13 0659    P.O.  520     IV Piggyback       Total Intake(mL/kg)  520 (8.7)     Urine (mL/kg/hr) 3050 (2.1) 2050 (1.4)     Drains 40 25     Stool 0 0     Blood        Total Output 3090 207     Net -3090 -1554            Urine Occurrence 1 x 1 x     Stool Occurrence 0 x              Physical Exam  Vitals and nursing note reviewed.   Constitutional:       Appearance: She is well-developed. She is not ill-appearing.   HENT:      Head: Normocephalic.   Eyes:      Pupils: Pupils are equal, round, and reactive to light.   Cardiovascular:      Rate and Rhythm: Normal rate and regular rhythm.      Heart sounds: Normal heart sounds.   Pulmonary:      Effort: Pulmonary effort is normal. No respiratory distress.      Breath sounds: Normal breath sounds. No wheezing or rales.   Abdominal:      Palpations: Abdomen is soft. There is no mass.      Tenderness: There is no guarding or rebound.      Comments: Abd inc line healing well  Colostomy small amt of output  PRASAD serous drainage     Skin:     General: Skin is warm and dry.   Neurological:      Mental Status: She is alert and oriented to person, place, and time.   Psychiatric:         Behavior: Behavior normal.         Thought Content: Thought content normal.         Judgment: Judgment normal.           Significant Labs:  BMP (Last 3 Results):   Recent Labs   Lab 05/07/22  0608 05/08/22  0530 05/09/22  0416 05/09/22  1214 05/10/22  0508 05/11/22  0420 05/12/22  0354   GLU 75 99 83   < > 134* 81 78    133* 140   < > 138 131* 131*   K 3.9 3.7 3.5   < > 4.8 3.2* 3.6   CL 99 94* 97   < > 100 91* 91*   CO2 31* 30* 35*   < > 32* 31* 33*   BUN 21 20 12   < > 20 18 15   CREATININE 0.6 0.7 0.6   < > 0.7 0.7 0.6   CALCIUM 8.6* 8.4* 8.5*   < > 8.6* 8.7 8.6*   MG 1.7 1.6 1.7  --   --   --   --     < > = values in this interval not displayed.     CBC (Last 3 Results):   Recent Labs   Lab 05/09/22  0416 05/09/22  1214 05/10/22  0508   WBC 2.98* 1.49* 3.34*   RBC 4.17 3.99* 4.22   HGB 11.5* 11.0* 11.6*   HCT 36.5* 35.4* 37.9    263 222   MCV 88 89 90   MCH 27.6 27.6 27.5   MCHC 31.5* 31.1* 30.6*       Significant Diagnostics:  None

## 2022-05-12 NOTE — ASSESSMENT & PLAN NOTE
- reports dizziness on exertion/standing. Possibly related to Bps or multifactorial given acute illness and acute decompensated systolic heart failure   - will repeat CBC to check for anemia

## 2022-05-12 NOTE — PT/OT/SLP PROGRESS
Physical Therapy Treatment    Patient Name:  dOette Hart   MRN:  [id number]    Recent Surgery: Procedure(s) (LRB):  COLECTOMY, SIGMOID (ERAS High, lithotomy) (N/A)  COLONOSCOPY (N/A)  CYSTOSCOPY,WITH URETERAL CATHETER INSERTION (Bilateral)  CHOLECYSTECTOMY (N/A)  APPENDECTOMY (N/A)  EXCISION, CECUM (N/A)  CREATION, COLOSTOMY 3 Days Post-Op    Recommendations:     Discharge Recommendations:  nursing facility, skilled   Discharge Equipment Recommendations: walker, rolling, bedside commode   Barriers to discharge: Increased level of assist and Decreased caregiver support    Highest Level of Mobility: Gait 6', 9'  Assistance Required: Min(A) w/ RW    Assessment:     Odette Hart is a 66 y.o. female admitted with a medical diagnosis of Calculus of gallbladder with cholecystitis without biliary obstruction.  She presents with the following impairments/functional limitations:  weakness, impaired endurance, impaired self care skills, impaired functional mobilty, decreased lower extremity function, gait instability, impaired balance, pain, impaired cardiopulmonary response to activity. Odette Hart would benefit from acute PT intervention to address listed functional deficits, provide patient/caregiver education, reduce fall risk, and maximize (I) and safety with functional mobility.    Pt met seated in bedside chair and agreeable to PT treatment. Today's PT treatment focus was on gait training to improve activity tolerance and function. Pt reports increased pain and dizziness today as compared to previous session and requires increased assistance. PT provided extensive education on the importance of continued participation with therapy and the benefits of OOB mobility. Writing therapist changed d/c recommendation to SNF today as pt requires more assistance as compared to eval. She is also anxious about returning home as she has no caregiver support.    Rehab Prognosis: Good; patient would benefit from acute  "skilled PT services to address these deficits and reach maximum level of function.      Plan:     During this hospitalization, patient to be seen 3 x/week to address the identified rehab impairments via gait training, therapeutic activities, therapeutic exercises and progress toward the following goals:    · Plan of Care Expires:  06/10/22    This plan of care has been discussed with the patient/caregiver, who was included in its development and is in agreement with the identified goals and treatment plan.     Subjective     Communicated with RN prior to session.  Patient agreeable to participate.     Pain/Comfort:  · Pain Rating 1: 9/10  · Location - Orientation 1: generalized  · Location 1: abdomen  · Pain Addressed 1: Reposition, Distraction, Cessation of Activity  · Pain Rating Post-Intervention 1: 9/10    Chief Complaint: Dizziness and fatigue  Patient/Family Comments/goals: "I just feel so bad today"      Objective:     Patient found HOB elevated with telemetry, quinones catheter, PRASAD drain (ostomy)  upon PT entry to room.    General Precautions: Standard, fall   Orthopedic Precautions:N/A   Braces: N/A   Vitals: Sitting up in chair, /55 mmHg      Exams:     Cognition:  o Patient is oriented to Person, Place, Time, Situation, follows commands 100% of the time    Functional Mobility:    Bed Mobility:  · Not assessed as pt found up in chair and returned to chair after session    Transfers:   · Sit to Stand Transfer: Minimal Assistance  from bedside chair with RW AD              Gait:  · Patient received gait training 2 trials, 6', 9' with Minimal Assistance and rolling walker  · Gait Assessment: unsteady gait, decreased step length, flexed posture and decreased jan  · Gait Pattern Observed: Step-to pattern  · Comments: All lines remained intact throughout ambulation trial, gait belt utilized, chair follow for patient safety. Pt demos a very slow pacing and requires cues from therapist for improved upright " posture. Pt reports increased dizziness and requires a seated rest break between gait trials.    Balance:  · Static Stand:   · Contact-Guard Assist with Rolling walker  · Good: Patient able to maintain balance without handhold support, limited postural sway  · Dynamic Stand:  · Min Assist with Rolling walker      Therapeutic Activities/Exercises      Patient assisted with functional mobility as noted above   Discussed d/c rec for SNF, pt agreeable   Patient educated on the importance of early mobility to prevent functional decline during hospital stay   Patient was instructed to utilize staff assistance for mobility/transfers.  o Patient is appropriate to transfer with min(A) and RN/PCT assist   Patient educated on PT POC and role of PT in acute care   White board updated regarding patient's safest level of mobility with staff assistance, RN also updated.     AM-PAC 6 CLICK MOBILITY  Turning over in bed (including adjusting bedclothes, sheets and blankets)?: 3  Sitting down on and standing up from a chair with arms (e.g., wheelchair, bedside commode, etc.): 3  Moving from lying on back to sitting on the side of the bed?: 3  Moving to and from a bed to a chair (including a wheelchair)?: 3  Need to walk in hospital room?: 3  Climbing 3-5 steps with a railing?: 2  Basic Mobility Total Score: 17     Patient left up in chair with all lines intact, call button in reach and RN notified.        History/Goals:     PAST MEDICAL HISTORY:  Past Medical History:   Diagnosis Date    Acute coronary syndrome     Allergy     Arthritis     Cardiomyopathy     CHF (congestive heart failure)     Coronary artery disease     Coronary artery disease of native artery of native heart with stable angina pectoris 4/19/2021 1/24/2022: [facility]: Cath: LAD: Proximal stent patent. LCX: Proximal 80%. LCX: Dominant. Moderate disease. LCX: HEVER 2.75 x 18 mm. Distal dissection. HEVER 2.75 x 22 mm.     Diverticulitis     Diverticulosis      Familial hypercholesterolemia 2022    Former smoker 2022    Heart attack     Heart disease     History of myocardial infarction 2022    Hyperlipidemia     Hypertension     Hypertension 2022    ICD (implantable cardioverter-defibrillator) in place     Non-ST elevation myocardial infarction (NSTEMI) 2019       Past Surgical History:   Procedure Laterality Date    APPENDECTOMY N/A 2022    Procedure: APPENDECTOMY;  Surgeon: Rafa Cardozo MD;  Location: [facility] FLR;  Service: Colon and Rectal;  Laterality: N/A;    ATRIAL CARDIAC PACEMAKER INSERTION      CECECTOMY N/A 2022    Procedure: EXCISION, CECUM;  Surgeon: Rafa Cardozo MD;  Location: [facility] FLR;  Service: Colon and Rectal;  Laterality: N/A;     SECTION      CHOLECYSTECTOMY N/A 2022    Procedure: CHOLECYSTECTOMY;  Surgeon: Doug Epstein MD;  Location: [facility] FLR;  Service: General;  Laterality: N/A;    COLONOSCOPY N/A 2022    Procedure: COLONOSCOPY;  Surgeon: Rafa Cardozo MD;  Location: [facility];  Service: Colon and Rectal;  Laterality: N/A;    COLOSTOMY  2022    Procedure: CREATION, COLOSTOMY;  Surgeon: Rafa Cardozo MD;  Location: [facility] FLR;  Service: Colon and Rectal;;    CORONARY STENT PLACEMENT      LEFT HEART CATHETERIZATION Left 2022    Procedure: CATHETERIZATION, HEART, LEFT;  Surgeon: Yohannes Cordova MD;  Location: [facility] CATH LAB;  Service: Cardiology;  Laterality: Left;       GOALS:   Multidisciplinary Problems     Physical Therapy Goals        Problem: Physical Therapy    Goal Priority Disciplines Outcome Goal Variances Interventions   Physical Therapy Goal     PT, PT/OT Ongoing, Progressing     Description: Goals to be met by: 22     Patient will increase functional independence with mobility by performin. Supine to sit with Modified [place]  2. Sit to supine with Modified [place]  3. Sit to stand  transfer with Supervision  4. Bed to chair transfer with Supervision using LRAD  5. Gait  x 150 feet with Supervision using LRAD.   6. Ascend/descend 4 stair with bilateral Handrails and Supervision using LRAD.   7. Lower extremity exercise program x15 reps per handout, with supervision                     Time Tracking:     PT Received On: 05/12/22  PT Start Time: 1026     PT Stop Time: 1057  PT Total Time (min): 31 min     Billable Minutes: Gait Training 20 and Therapeutic Activity 11      Marion Arriaga, PT  05/12/2022  Pager# [phone]

## 2022-05-12 NOTE — PLAN OF CARE
POC reviewed with pt. Pt verbalized understanding.   AAOX'4.   VSS on RA.   Pt tolerating small amounts of clear liquid diet. Pt did not want to eat her low fiber/low residue diet.   C/o nausea treated with scheduled and PRN meds as per MD order.  Midline incision CDI.RLQ PRASAD drain intact.   LLQ colostomy intact with  no output.   Cardona intact  Worked with PT, OT. Sat up on the chair for more than 3 hrs, complaints of pain in the tailbone, balsam peru applied , foam applied and air mattress on the bed   Bed in lowest position with call light within reach. WCTM

## 2022-05-13 PROBLEM — R23.9 ALTERATION IN SKIN INTEGRITY: Status: ACTIVE | Noted: 2022-01-01

## 2022-05-13 PROBLEM — F39 MOOD DISORDER: Status: ACTIVE | Noted: 2022-01-01

## 2022-05-13 NOTE — NURSING
Pt's quinones leaking, urine draining in the bag also but leaking around the quinones. MD notified.

## 2022-05-13 NOTE — PROGRESS NOTES
[facility]  Colorectal Surgery  Progress Note    Patient Name: Odette Hart  MRN: [id number]  Admission Date: 5/6/2022  Hospital Length of Stay: 4 days  Attending Physician: Rafa Cardozo MD    Subjective:     Interval History: Patient seen and examined at bedside. NAEON. Up with PT yesterday. Tolerating small amounts of PO, trying to drink boost. Endorses some nausea but no emesis.    Post-Op Info:  Procedure(s) (LRB):  COLECTOMY, SIGMOID (ERAS High, lithotomy) (N/A)  COLONOSCOPY (N/A)  CYSTOSCOPY,WITH URETERAL CATHETER INSERTION (Bilateral)  CHOLECYSTECTOMY (N/A)  APPENDECTOMY (N/A)  EXCISION, CECUM (N/A)  CREATION, COLOSTOMY   4 Days Post-Op      Medications:  Continuous Infusions:  Scheduled Meds:   acetaminophen  1,000 mg Oral Q8H    aspirin  81 mg Oral Daily    balsam peru-castor oiL   Topical (Top) BID    carvediloL  12.5 mg Oral BID WM    ceFEPime (MAXIPIME) IVPB  2 g Intravenous Q8H    enoxaparin  40 mg Subcutaneous Daily    ezetimibe  10 mg Oral QHS    fluconazole (DIFLUCAN) IV (PEDS and ADULTS)  400 mg Intravenous Q24H    furosemide (LASIX) injection  40 mg Intravenous Daily    linezolid  600 mg Intravenous Q12H    metronidazole  500 mg Intravenous Q8H    mupirocin   Nasal BID    pantoprazole  40 mg Oral Daily    promethazine (PHENERGAN) IVPB  12.5 mg Intravenous Q6H    scopolamine  1 patch Transdermal Q3 Days     PRN Meds:   albuterol-ipratropium    ALPRAZolam    aluminum-magnesium hydroxide-simethicone    dextrose 10%    dextrose 10%    glucagon (human recombinant)    glucose    glucose    HYDROmorphone    insulin aspart U-100    ondansetron    oxyCODONE    oxyCODONE    sodium chloride 0.9%    sodium chloride 0.9%        Objective:     Vital Signs (Most Recent):  Temp: 96.2 °F (35.7 °C) (05/13/22 0439)  Pulse: 70 (05/13/22 0734)  Resp: 16 (05/13/22 0528)  BP: (!) 111/53 (05/13/22 0439)  SpO2: 96 % (05/13/22 0439)   Vital Signs (24h Range):  Temp:  [96.2 °F  (35.7 °C)-97 °F (36.1 °C)] 96.2 °F (35.7 °C)  Pulse:  [65-84] 70  Resp:  [16-20] 16  SpO2:  [92 %-99 %] 96 %  BP: ()/(46-57) 111/53     Intake/Output - Last 3 Shifts         05/11 0700  05/12 0659 05/12 0700  05/13 0659 05/13 0700  05/14 0659    P.O. 520 150     Total Intake(mL/kg) 520 (8.7) 150 (2.5)     Urine (mL/kg/hr) 2050 (1.4) 2050 (1.4)     Drains 25 10     Stool 0 0     Total Output 2075 2060     Net -1555 -1910            Urine Occurrence 1 x      Stool Occurrence  0 x             Physical Exam  Vitals and nursing note reviewed.   Constitutional:       Appearance: She is well-developed. She is not ill-appearing.   HENT:      Head: Normocephalic.   Eyes:      Pupils: Pupils are equal, round, and reactive to light.   Cardiovascular:      Rate and Rhythm: Normal rate and regular rhythm.      Heart sounds: Normal heart sounds.   Pulmonary:      Effort: Pulmonary effort is normal. No respiratory distress.      Breath sounds: Normal breath sounds. No wheezing or rales.   Abdominal:      Palpations: Abdomen is soft. There is no mass.      Tenderness: There is no guarding or rebound.      Comments: Incisions c/d/I, ostomy pink/viable with no output   Skin:     General: Skin is warm and dry.   Neurological:      Mental Status: She is alert and oriented to person, place, and time.   Psychiatric:         Behavior: Behavior normal.         Thought Content: Thought content normal.         Judgment: Judgment normal.           Significant Labs:  BMP (Last 3 Results):   Recent Labs   Lab 05/07/22  0608 05/08/22  0530 05/09/22  0416 05/09/22  1214 05/11/22  0420 05/12/22  0354 05/13/22  0443   GLU 75 99 83   < > 81 78 84    133* 140   < > 131* 131* 132*   K 3.9 3.7 3.5   < > 3.2* 3.6 3.0*   CL 99 94* 97   < > 91* 91* 91*   CO2 31* 30* 35*   < > 31* 33* 33*   BUN 21 20 12   < > 18 15 14   CREATININE 0.6 0.7 0.6   < > 0.7 0.6 0.6   CALCIUM 8.6* 8.4* 8.5*   < > 8.7 8.6* 8.8   MG 1.7 1.6 1.7  --   --   --   --     <  > = values in this interval not displayed.       CBC (Last 3 Results):   Recent Labs   Lab 05/09/22  1214 05/10/22  0508 05/12/22  1326   WBC 1.49* 3.34* 2.52*   RBC 3.99* 4.22 3.87*   HGB 11.0* 11.6* 10.7*   HCT 35.4* 37.9 33.0*    222 211   MCV 89 90 85   MCH 27.6 27.5 27.6   MCHC 31.1* 30.6* 32.4         Significant Diagnostics:  None    Assessment/Plan:     Acute cholecystitis  OR 5/9    Severe malnutrition  Nutrition consulted. Most recent weight and BMI monitored-     Malnutrition Type and Energy Intake  Malnutrition Type: chronic illness  Energy Intake: moderate energy intake    Malnutrition (Moderate to Severe)  Energy Intake (Malnutrition): less than 75% for greater than or equal to 1 month  Enc diet and boost            Measurements:  Wt Readings from Last 1 Encounters:   05/09/22 59.8 kg (131 lb 13.4 oz)   Body mass index is 23.35 kg/m².    Recommendations: Recommendation/Intervention: 1.  Goals: Pt to meet >75% EEN/EPN by RD follow up    Patient has been screened and assessed by RD. RD will follow patient.      Urinary tract infection with pyuria  Consult to ID for hx of UTI with VRE, continuing antibiotics until seen by ID  Appreciate ID input, continue antibiotics 5-7 days post op    Colovesical fistula  Repaired during surgery 5/9  Cardona in place    Chronic combined systolic and diastolic congestive heart failure  Patient is identified as having Combined Systolic and Diastolic heart failure that is Chronic. CHF is currently controlled. Latest ECHO performed and demonstrates- Results for orders placed during the hospital encounter of 01/22/22    Echo Saline Bubble? No    Interpretation Summary  · The left ventricle is severely enlarged with mild eccentric hypertrophy and severely decreased systolic function.  · There are segmental left ventricular wall motion abnormalities. The mid anteroseptal and anterior walls are akinetic. The apex is akinetic. The remainder of the left ventricular walls are  severely hypokinetic.  · The estimated ejection fraction is 20%.  · Spontaneous echocardiographic contrast in the left ventricle.  · Grade III left ventricular diastolic dysfunction.  · Severe left atrial enlargement.  · Normal right ventricular size with normal right ventricular systolic function.  · Mild right atrial enlargement.  · Mild aortic regurgitation.  · Mild-to-moderate mitral regurgitation.  · Mild tricuspid regurgitation.  · The estimated PA systolic pressure is 28 mmHg.  · Intermediate central venous pressure (8 mmHg).  · Pacemaker/ICD lead.  . Continue home meds and monitor clinical status closely. Monitor on telemetry.   Recent Labs   Lab 05/09/22  1214   BNP 2,792*   .      Primary hypertension  Chronic, controlled.  Latest blood pressure and vitals reviewed-   Temp:  [97.5 °F (36.4 °C)-99.1 °F (37.3 °C)]   Pulse:  []   Resp:  [16-20]   BP: (102-118)/(52-59)   SpO2:  [94 %-97 %] .   Home meds for hypertension were reviewed and noted below.   Hypertension Medications             carvediloL (COREG) 12.5 MG tablet Take 1 tablet (12.5 mg total) by mouth 2 (two) times daily with meals.    nitroGLYCERIN (NITROSTAT) 0.4 MG SL tablet Place 1 tablet (0.4 mg total) under the tongue every 5 (five) minutes as needed for Chest pain.    spironolactone (ALDACTONE) 25 MG tablet Take 1 tablet (25 mg total) by mouth once daily.          While in the hospital, will manage blood pressure as follows; Continue home antihypertensive regimen    Will utilize p.r.n. blood pressure medication only if patient's blood pressure greater than  180/110 and she develops symptoms such as worsening chest pain or shortness of breath.    Coronary artery disease of native artery of native heart with stable angina pectoris  Patient with known CAD s/p stent placement and CABG, which is controlled   Tele  Resume home meds    Acute diverticulitis  OR 5/9 sigmoid colectomy, end colostomy, takedown of fistuli, and  cholecystectomy    -await return of bowel function, lfd  -continue multi modality for pain control, dc pca and begin oral jpain meds  -encourage ambulation and use of IS  -phani hose and SCD  -prophalactic lovenox and PPI  -quinones in place for 8-10 days    Automatic implantable cardioverter-defibrillator in situ  Cont tele  Cont home meds  Monitor vs          Suri Candelario MD  Colorectal Surgery  [facility]

## 2022-05-13 NOTE — SUBJECTIVE & OBJECTIVE
"Interval History: remains tired this morning. Stating "I hope yall are taking care of my infection". Echo with small ivc, transition to oral lasix today. Suspect some of SOB symptoms related to deconditioning/recent surgery and psych component. Will have psychology eval patient today. Also with some lightheadedness after pain meds    Review of Systems   Constitutional:  Negative for chills, fatigue and fever.   HENT:  Negative for congestion and rhinorrhea.    Eyes:  Negative for visual disturbance.   Respiratory:  Positive for shortness of breath. Negative for cough.    Cardiovascular:  Negative for chest pain, palpitations and leg swelling.   Gastrointestinal:  Negative for abdominal pain, diarrhea, nausea and vomiting.   Genitourinary:  Negative for dysuria and urgency.   Musculoskeletal:  Negative for back pain.   Skin:  Negative for rash.   Neurological:  Positive for light-headedness. Negative for dizziness, syncope, weakness and headaches.   Psychiatric/Behavioral:  Negative for agitation and confusion.    All other systems reviewed and are negative.  Objective:     Vital Signs (Most Recent):  Temp: 98 °F (36.7 °C) (05/13/22 1129)  Pulse: 82 (05/13/22 1129)  Resp: 20 (05/13/22 1129)  BP: (!) 99/54 (05/13/22 1129)  SpO2: 98 % (05/13/22 1129)   Vital Signs (24h Range):  Temp:  [96.2 °F (35.7 °C)-98 °F (36.7 °C)] 98 °F (36.7 °C)  Pulse:  [67-84] 82  Resp:  [16-20] 20  SpO2:  [92 %-99 %] 98 %  BP: ()/(47-55) 99/54     Weight: 59.4 kg (131 lb)  Body mass index is 23.21 kg/m².    Intake/Output Summary (Last 24 hours) at 5/13/2022 1252  Last data filed at 5/13/2022 0534  Gross per 24 hour   Intake 150 ml   Output 2060 ml   Net -1910 ml      Physical Exam  Constitutional:       Appearance: Normal appearance. She is well-developed.   HENT:      Head: Normocephalic and atraumatic.      Mouth/Throat:      Mouth: Mucous membranes are moist.   Eyes:      Conjunctiva/sclera: Conjunctivae normal.   Neck:      " Comments: No JVD  Cardiovascular:      Rate and Rhythm: Normal rate and regular rhythm.      Heart sounds: No murmur heard.  Pulmonary:      Effort: Pulmonary effort is normal. No respiratory distress.      Breath sounds: Normal breath sounds. No wheezing or rales.      Comments: On 0.5L NC  Abdominal:      General: There is no distension.      Palpations: Abdomen is soft.      Tenderness: There is abdominal tenderness.      Comments: Colostomy bag with minimal stool and gas, incision site c/d/i   Musculoskeletal:      Right lower leg: No edema.      Left lower leg: No edema.      Comments: PICC in RUE   Skin:     General: Skin is warm.   Neurological:      General: No focal deficit present.      Mental Status: She is alert and oriented to person, place, and time.   Psychiatric:      Comments: Appears tired       Significant Labs: All pertinent labs within the past 24 hours have been reviewed.  CBC:   Recent Labs   Lab 05/12/22  1326   WBC 2.52*   HGB 10.7*   HCT 33.0*        CMP:   Recent Labs   Lab 05/12/22  0354 05/13/22  0443   * 132*   K 3.6 3.0*   CL 91* 91*   CO2 33* 33*   GLU 78 84   BUN 15 14   CREATININE 0.6 0.6   CALCIUM 8.6* 8.8   PROT  --  6.7   ALBUMIN  --  1.8*   BILITOT  --  1.3*   ALKPHOS  --  66   AST  --  18   ALT  --  14   ANIONGAP 7* 8   EGFRNONAA >60.0 >60.0       Significant Imaging: I have reviewed all pertinent imaging results/findings within the past 24 hours.

## 2022-05-13 NOTE — CONSULTS
[facility]  Skin Integrity SIMRAN  Consult Note    Patient Name: Odette Hart  MRN: [id number]  Admission Date: 5/6/2022  Hospital Length of Stay: 4 days  Attending Physician: Rafa Cardozo MD  Primary Care Provider: Braxton Barragan MD     Consults  Subjective:     History of Present Illness:  Odette Hart is a 66 year old female with a PMHx of CAD s/p 2x HEVER placement in 1/2022 (on effient), CHF (EF 20%), ICD, and colovesicular fistula 2/2 complicated diverticulitis who presented to the ED with concerns for acute cholecystitis found on ultrasound. Patient was at [facility] and has been complaining of constant LUQ abdominal pain, poor PO intake, and N/V. She has been having abdominal pain, nausea/vomiting, and inability to tolerate PO since she was diagnosed with diverticulitis. They thought her symptoms were due to flagyl and it was discontinued with improvement, however her symptoms returned 10 days ago. She had a CT of her abdomen on 05/04 which showed improving diverticulitis, persistent colovesicular fistula and distended gallbladder with pericholecystic fluid. She was admitted to hospital medicine for management. Gen surg and CRS consulted. Pt is now s/p sigmoid colectomy, cholecystectomy, colovagino/vesico fistula resection, washout, and appendectomy with colostomy placement on 5/9. Wound care consulted for evaluation of skin injury.            Scheduled Meds:   acetaminophen  1,000 mg Oral Q8H    aspirin  81 mg Oral Daily    balsam peru-castor oiL   Topical (Top) BID    carvediloL  12.5 mg Oral BID WM    ceFEPime (MAXIPIME) IVPB  2 g Intravenous Q8H    enoxaparin  40 mg Subcutaneous Daily    ezetimibe  10 mg Oral QHS    fluconazole (DIFLUCAN) IV (PEDS and ADULTS)  400 mg Intravenous Q24H    [START ON 5/14/2022] furosemide  40 mg Oral Daily    linezolid  600 mg Intravenous Q12H    metronidazole  500 mg Intravenous Q8H    mupirocin   Nasal BID    pantoprazole  40 mg Oral Daily     promethazine (PHENERGAN) IVPB  12.5 mg Intravenous Q6H    scopolamine  1 patch Transdermal Q3 Days     Continuous Infusions:  PRN Meds:albuterol-ipratropium, ALPRAZolam, aluminum-magnesium hydroxide-simethicone, dextrose 10%, dextrose 10%, glucagon (human recombinant), glucose, glucose, HYDROmorphone, insulin aspart U-100, ondansetron, oxyCODONE, oxyCODONE, sodium chloride 0.9%, sodium chloride 0.9%    Review of patient's allergies indicates:   Allergen Reactions    Augmentin [amoxicillin-pot clavulanate] Swelling     Not allergic to amoxicillin just a derivative of augmentin    Lisinopril Other (See Comments)     Fluid around heart    Losartan Other (See Comments)     High potassium    Shellfish containing products Anaphylaxis     Pt.states she is allergic to SEAFOOD since the age of 13    Levaquin [levofloxacin] Other (See Comments)     Very bad joint pain    Clindamycin Palpitations     Chest pain        Past Medical History:   Diagnosis Date    Acute coronary syndrome     Allergy     Arthritis     Cardiomyopathy     CHF (congestive heart failure)     Coronary artery disease     Coronary artery disease of native artery of native heart with stable angina pectoris 4/19/2021 1/24/2022: [facility]: Cath: LAD: Proximal stent patent. LCX: Proximal 80%. LCX: Dominant. Moderate disease. LCX: HEVER 2.75 x 18 mm. Distal dissection. HEVER 2.75 x 22 mm.     Diverticulitis     Diverticulosis     Familial hypercholesterolemia 1/22/2022    Former smoker 1/24/2022    Heart attack     Heart disease     History of myocardial infarction 1/24/2022    Hyperlipidemia     Hypertension     Hypertension 1/24/2022    ICD (implantable cardioverter-defibrillator) in place     Non-ST elevation myocardial infarction (NSTEMI) 2/4/2019     Past Surgical History:   Procedure Laterality Date    APPENDECTOMY N/A 5/9/2022    Procedure: APPENDECTOMY;  Surgeon: Rafa Cardozo MD;  [place] [facility] [place] [address] [place] [address];  Service: Colon  and Rectal;  Laterality: N/A;    ATRIAL CARDIAC PACEMAKER INSERTION      CECECTOMY N/A 2022    Procedure: EXCISION, CECUM;  Surgeon: Rafa Cardozo MD;  Location: [facility] OR 2ND FLR;  Service: Colon and Rectal;  Laterality: N/A;     SECTION      CHOLECYSTECTOMY N/A 2022    Procedure: CHOLECYSTECTOMY;  Surgeon: Doug Epstein MD;  [facility];  Service: General;  Laterality: N/A;    COLONOSCOPY N/A 2022    Procedure: COLONOSCOPY;  Surgeon: Rafa Cardozo MD;  Location: [facility] OR 2ND FLR;  Service: Colon and Rectal;  Laterality: N/A;    COLOSTOMY  2022    Procedure: CREATION, COLOSTOMY;  Surgeon: Rafa Cardozo MD;  Location: [facility] OR 2ND FLR;  Service: Colon and Rectal;;    CORONARY STENT PLACEMENT      LEFT HEART CATHETERIZATION Left 2022    Procedure: CATHETERIZATION, HEART, LEFT;  Surgeon: Yohannes Cordova MD;  Location: [facility] CATH LAB;  Service: Cardiology;  Laterality: Left;       Family History       Problem Relation (Age of Onset)    Emphysema Father    Heart attack Brother    Heart disease Mother          Tobacco Use    Smoking status: Former Smoker     Packs/day: 0.50     Start date: 1976     Quit date: 2012     Years since quittin.4    Smokeless tobacco: Never Used   Substance and Sexual Activity    Alcohol use: No    Drug use: No    Sexual activity: Not on file     Review of Systems   Skin:  Positive for wound.     Objective:     Vital Signs (Most Recent):  Temp: 98 °F (36.7 °C) (22 1129)  Pulse: 82 (22 1129)  Resp: 20 (22 1129)  BP: (!) 99/54 (22 1129)  SpO2: 98 % (22 1129)   Vital Signs (24h Range):  Temp:  [96.2 °F (35.7 °C)-98 °F (36.7 °C)] 98 °F (36.7 °C)  Pulse:  [67-84] 82  Resp:  [16-20] 20  SpO2:  [92 %-99 %] 98 %  BP: ()/(47-55) 99/54     Weight: 59.4 kg (131 lb)  Body mass index is 23.21 kg/m².  Physical Exam  Constitutional:       Appearance: Normal appearance.   Skin:     General:  Skin is warm and dry.      Findings: Lesion present.   Neurological:      Mental Status: She is alert.       Laboratory:  All pertinent labs reviewed within the last 24 hours.    Diagnostic Results:  None        Assessment/Plan:          SIMRAN Skin Integrity Evaluation    Skin Integrity SIMRAN evaluation of patient as part of the comprehensive skin care team.   She has been admitted for 7 days. Skin injury was noted on 5/11/22. POA No.            Alteration in skin integrity  - consult received for evaluation of sacral injury.  - pt transferred from [facility] with LUQ abdominal pain concerning for acute cholecystitis.  - pt underwent cholecystectomy and colostomy placement on 5/9.  - pt seen sitting up to chair on assessment c/o pain to buttocks.  - she also c/o feeling sob and nauseous. Looked unwell. RN notified.  - pt assisted back to bed with help of RN.  - sacrum with small area of maroon/purple discoloration with blanchable redness to the periwound. Skin intact, no breakdown noted.  - foam dressing in place. Pt also with indwelling catheter.  - continue BPCO (Venelex) bid/prn.  - currently on [place] surface with waffle overlay.  - will order specialty bed, heel boots and chair cushion.   - nursing to maintain pressure injury prevention measures.  - nursing to continue scheduled wound care per orders.         Thank you for your consult. I will follow-up with patient. Please contact us if you have any additional questions.         Callie Hart, NP  Skin Integrity SIMRAN  [facility]

## 2022-05-13 NOTE — HPI
Odette Hart is a 66 year old female with a PMHx of CAD s/p 2x HEVER placement in 1/2022 (on effient), CHF (EF 20%), ICD, and colovesicular fistula 2/2 complicated diverticulitis who presented to the ED with concerns for acute cholecystitis found on ultrasound. Patient was at [facility] and has been complaining of constant LUQ abdominal pain, poor PO intake, and N/V. She has been having abdominal pain, nausea/vomiting, and inability to tolerate PO since she was diagnosed with diverticulitis. They thought her symptoms were due to flagyl and it was discontinued with improvement, however her symptoms returned 10 days ago. She had a CT of her abdomen on 05/04 which showed improving diverticulitis, persistent colovesicular fistula and distended gallbladder with pericholecystic fluid. She was admitted to hospital medicine for management. Gen surg and CRS consulted. Pt is now s/p sigmoid colectomy, cholecystectomy, colovagino/vesico fistula resection, washout, and appendectomy with colostomy placement on 5/9. Wound care consulted for evaluation of skin injury.

## 2022-05-13 NOTE — ASSESSMENT & PLAN NOTE
- reports dizziness on exertion/standing. Possibly related to Bps or multifactorial given acute illness and acute decompensated systolic heart failure. Although now appears to be related to pain meds. Hgb stable

## 2022-05-13 NOTE — SUBJECTIVE & OBJECTIVE
Scheduled Meds:   acetaminophen  1,000 mg Oral Q8H    aspirin  81 mg Oral Daily    balsam peru-castor oiL   Topical (Top) BID    carvediloL  12.5 mg Oral BID WM    ceFEPime (MAXIPIME) IVPB  2 g Intravenous Q8H    enoxaparin  40 mg Subcutaneous Daily    ezetimibe  10 mg Oral QHS    fluconazole (DIFLUCAN) IV (PEDS and ADULTS)  400 mg Intravenous Q24H    [START ON 5/14/2022] furosemide  40 mg Oral Daily    linezolid  600 mg Intravenous Q12H    metronidazole  500 mg Intravenous Q8H    mupirocin   Nasal BID    pantoprazole  40 mg Oral Daily    promethazine (PHENERGAN) IVPB  12.5 mg Intravenous Q6H    scopolamine  1 patch Transdermal Q3 Days     Continuous Infusions:  PRN Meds:albuterol-ipratropium, ALPRAZolam, aluminum-magnesium hydroxide-simethicone, dextrose 10%, dextrose 10%, glucagon (human recombinant), glucose, glucose, HYDROmorphone, insulin aspart U-100, ondansetron, oxyCODONE, oxyCODONE, sodium chloride 0.9%, sodium chloride 0.9%    Review of patient's allergies indicates:   Allergen Reactions    Augmentin [amoxicillin-pot clavulanate] Swelling     Not allergic to amoxicillin just a derivative of augmentin    Lisinopril Other (See Comments)     Fluid around heart    Losartan Other (See Comments)     High potassium    Shellfish containing products Anaphylaxis     Pt.states she is allergic to SEAFOOD since the age of 13    Levaquin [levofloxacin] Other (See Comments)     Very bad joint pain    Clindamycin Palpitations     Chest pain        Past Medical History:   Diagnosis Date    Acute coronary syndrome     Allergy     Arthritis     Cardiomyopathy     CHF (congestive heart failure)     Coronary artery disease     Coronary artery disease of native artery of native heart with stable angina pectoris 4/19/2021 1/24/2022: [facility]: Cath: LAD: Proximal stent patent. LCX: Proximal 80%. LCX: Dominant. Moderate disease. LCX: HEVER 2.75 x 18 mm. Distal dissection. HEVER 2.75 x 22 mm.     Diverticulitis     Diverticulosis      Familial hypercholesterolemia 2022    Former smoker 2022    Heart attack     Heart disease     History of myocardial infarction 2022    Hyperlipidemia     Hypertension     Hypertension 2022    ICD (implantable cardioverter-defibrillator) in place     Non-ST elevation myocardial infarction (NSTEMI) 2019     Past Surgical History:   Procedure Laterality Date    APPENDECTOMY N/A 2022    Procedure: APPENDECTOMY;  Surgeon: Rafa Cardozo MD;  [facility];  Service: Colon and Rectal;  Laterality: N/A;    ATRIAL CARDIAC PACEMAKER INSERTION      CECECTOMY N/A 2022    Procedure: EXCISION, CECUM;  Surgeon: Rafa Cardozo MD;  [facility];  Service: Colon and Rectal;  Laterality: N/A;     SECTION      CHOLECYSTECTOMY N/A 2022    Procedure: CHOLECYSTECTOMY;  Surgeon: Doug Epstein MD;  Location: [facility] OR 2ND FLR;  Service: General;  Laterality: N/A;    COLONOSCOPY N/A 2022    Procedure: COLONOSCOPY;  Surgeon: Rafa Cardozo MD;  [facility];  Service: Colon and Rectal;  Laterality: N/A;    COLOSTOMY  2022    Procedure: CREATION, COLOSTOMY;  Surgeon: Rafa Cardozo MD;  [facility];  Service: Colon and Rectal;;    CORONARY STENT PLACEMENT      LEFT HEART CATHETERIZATION Left 2022    Procedure: CATHETERIZATION, HEART, LEFT;  Surgeon: Yohannes Cordova MD;  Location: [facility] CATH LAB;  Service: Cardiology;  Laterality: Left;       Family History       Problem Relation (Age of Onset)    Emphysema Father    Heart attack Brother    Heart disease Mother          Tobacco Use    Smoking status: Former Smoker     Packs/day: 0.50     Start date: 1976     Quit date: 2012     Years since quittin.4    Smokeless tobacco: Never Used   Substance and Sexual Activity    Alcohol use: No    Drug use: No    Sexual activity: Not on file     Review of Systems   Skin:  Positive for wound.     Objective:      Vital Signs (Most Recent):  Temp: 98 °F (36.7 °C) (05/13/22 1129)  Pulse: 82 (05/13/22 1129)  Resp: 20 (05/13/22 1129)  BP: (!) 99/54 (05/13/22 1129)  SpO2: 98 % (05/13/22 1129)   Vital Signs (24h Range):  Temp:  [96.2 °F (35.7 °C)-98 °F (36.7 °C)] 98 °F (36.7 °C)  Pulse:  [67-84] 82  Resp:  [16-20] 20  SpO2:  [92 %-99 %] 98 %  BP: ()/(47-55) 99/54     Weight: 59.4 kg (131 lb)  Body mass index is 23.21 kg/m².  Physical Exam  Constitutional:       Appearance: Normal appearance.   Skin:     General: Skin is warm and dry.      Findings: Lesion present.   Neurological:      Mental Status: She is alert.       Laboratory:  All pertinent labs reviewed within the last 24 hours.    Diagnostic Results:  None

## 2022-05-13 NOTE — SUBJECTIVE & OBJECTIVE
Subjective:     Interval History: Patient seen and examined at bedside. NAEON. Up with PT yesterday. Tolerating small amounts of PO, trying to drink boost. Endorses some nausea but no emesis.    Post-Op Info:  Procedure(s) (LRB):  COLECTOMY, SIGMOID (ERAS High, lithotomy) (N/A)  COLONOSCOPY (N/A)  CYSTOSCOPY,WITH URETERAL CATHETER INSERTION (Bilateral)  CHOLECYSTECTOMY (N/A)  APPENDECTOMY (N/A)  EXCISION, CECUM (N/A)  CREATION, COLOSTOMY   4 Days Post-Op      Medications:  Continuous Infusions:  Scheduled Meds:   acetaminophen  1,000 mg Oral Q8H    aspirin  81 mg Oral Daily    balsam peru-castor oiL   Topical (Top) BID    carvediloL  12.5 mg Oral BID WM    ceFEPime (MAXIPIME) IVPB  2 g Intravenous Q8H    enoxaparin  40 mg Subcutaneous Daily    ezetimibe  10 mg Oral QHS    fluconazole (DIFLUCAN) IV (PEDS and ADULTS)  400 mg Intravenous Q24H    furosemide (LASIX) injection  40 mg Intravenous Daily    linezolid  600 mg Intravenous Q12H    metronidazole  500 mg Intravenous Q8H    mupirocin   Nasal BID    pantoprazole  40 mg Oral Daily    promethazine (PHENERGAN) IVPB  12.5 mg Intravenous Q6H    scopolamine  1 patch Transdermal Q3 Days     PRN Meds:   albuterol-ipratropium    ALPRAZolam    aluminum-magnesium hydroxide-simethicone    dextrose 10%    dextrose 10%    glucagon (human recombinant)    glucose    glucose    HYDROmorphone    insulin aspart U-100    ondansetron    oxyCODONE    oxyCODONE    sodium chloride 0.9%    sodium chloride 0.9%        Objective:     Vital Signs (Most Recent):  Temp: 96.2 °F (35.7 °C) (05/13/22 0439)  Pulse: 70 (05/13/22 0734)  Resp: 16 (05/13/22 0528)  BP: (!) 111/53 (05/13/22 0439)  SpO2: 96 % (05/13/22 0439)   Vital Signs (24h Range):  Temp:  [96.2 °F (35.7 °C)-97 °F (36.1 °C)] 96.2 °F (35.7 °C)  Pulse:  [65-84] 70  Resp:  [16-20] 16  SpO2:  [92 %-99 %] 96 %  BP: ()/(46-57) 111/53     Intake/Output - Last 3 Shifts         05/11 0700  05/12 0659 05/12 0700 05/13 0659 05/13  0700  05/14 0659    P.O. 520 150     Total Intake(mL/kg) 520 (8.7) 150 (2.5)     Urine (mL/kg/hr) 2050 (1.4) 2050 (1.4)     Drains 25 10     Stool 0 0     Total Output 2075 2060     Net -1555 -1910            Urine Occurrence 1 x      Stool Occurrence  0 x             Physical Exam  Vitals and nursing note reviewed.   Constitutional:       Appearance: She is well-developed. She is not ill-appearing.   HENT:      Head: Normocephalic.   Eyes:      Pupils: Pupils are equal, round, and reactive to light.   Cardiovascular:      Rate and Rhythm: Normal rate and regular rhythm.      Heart sounds: Normal heart sounds.   Pulmonary:      Effort: Pulmonary effort is normal. No respiratory distress.      Breath sounds: Normal breath sounds. No wheezing or rales.   Abdominal:      Palpations: Abdomen is soft. There is no mass.      Tenderness: There is no guarding or rebound.      Comments: Incisions c/d/I, ostomy pink/viable with no output   Skin:     General: Skin is warm and dry.   Neurological:      Mental Status: She is alert and oriented to person, place, and time.   Psychiatric:         Behavior: Behavior normal.         Thought Content: Thought content normal.         Judgment: Judgment normal.           Significant Labs:  BMP (Last 3 Results):   Recent Labs   Lab 05/07/22  0608 05/08/22  0530 05/09/22  0416 05/09/22  1214 05/11/22  0420 05/12/22  0354 05/13/22  0443   GLU 75 99 83   < > 81 78 84    133* 140   < > 131* 131* 132*   K 3.9 3.7 3.5   < > 3.2* 3.6 3.0*   CL 99 94* 97   < > 91* 91* 91*   CO2 31* 30* 35*   < > 31* 33* 33*   BUN 21 20 12   < > 18 15 14   CREATININE 0.6 0.7 0.6   < > 0.7 0.6 0.6   CALCIUM 8.6* 8.4* 8.5*   < > 8.7 8.6* 8.8   MG 1.7 1.6 1.7  --   --   --   --     < > = values in this interval not displayed.       CBC (Last 3 Results):   Recent Labs   Lab 05/09/22  1214 05/10/22  0508 05/12/22  1326   WBC 1.49* 3.34* 2.52*   RBC 3.99* 4.22 3.87*   HGB 11.0* 11.6* 10.7*   HCT 35.4* 37.9 33.0*     222 211   MCV 89 90 85   MCH 27.6 27.5 27.6   MCHC 31.1* 30.6* 32.4         Significant Diagnostics:  None

## 2022-05-13 NOTE — PROGRESS NOTES
[facility] Medicine  Progress Note    Patient Name: Odette Hart  MRN: [id number]  Patient Class: IP- Inpatient   Admission Date: 5/6/2022  Length of Stay: 4 days  Attending Physician: Rafa Cardozo MD  Primary Care Provider: Braxton Barragan MD        Subjective:     Principal Problem:Calculus of gallbladder with cholecystitis without biliary obstruction        HPI:  Odette Hart is a 66 y.o. 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 Patient was at [facility] and has been complaining of constant LUQ abdominal pain, poor PO intake, and N/V. She has been having abdominal pain, nausea/vomiting, and inability to tolerate PO since she was diagnosed with diverticulitis. They thought her symptoms were due to flagyl and it was discontinued with improvement, however her symptoms returned 10 days ago. She had a CT of her abdomen on 05/04 which showed improving diverticulitis, persistent colovesicular fistula and distended gallbladder with pericholecystic fluid. She denies any fevers, chills, chest pain, dysuria.    General surgery consulted in ED and recommend HIDA.        Overview/Hospital Course:  Ms. Hart was admitted to Hospital Medicine for management of acute cholecystitis.  Gen Surg was consulted and HIDA was ordered.  CRS was consulted who plan for corrective surgery of her colovesical fistula with ostomy placement on 5/9 with combined cholecystectomy. Transferred to CRS service,  consulted for continued care in setting of ADHF. Started on diuresis with lasix 40mg IV BID, symptoms and oxygen requirements now much improved. Lasix weaned to 40mg IV daily. Repeat echo  unchanged with IVC 3, although BNP remains elevated. Will transition to oral lasix today. Also will have pts outpt psychologist come see  "her given her anxiety and depressive symptoms.       Interval History: remains tired this morning. Stating "I hope yall are taking care of my infection". Echo with small ivc, transition to oral lasix today. Suspect some of SOB symptoms related to deconditioning/recent surgery and psych component. Will have psychology eval patient today. Also with some lightheadedness after pain meds    Review of Systems   Constitutional:  Negative for chills, fatigue and fever.   HENT:  Negative for congestion and rhinorrhea.    Eyes:  Negative for visual disturbance.   Respiratory:  Positive for shortness of breath. Negative for cough.    Cardiovascular:  Negative for chest pain, palpitations and leg swelling.   Gastrointestinal:  Negative for abdominal pain, diarrhea, nausea and vomiting.   Genitourinary:  Negative for dysuria and urgency.   Musculoskeletal:  Negative for back pain.   Skin:  Negative for rash.   Neurological:  Positive for light-headedness. Negative for dizziness, syncope, weakness and headaches.   Psychiatric/Behavioral:  Negative for agitation and confusion.    All other systems reviewed and are negative.  Objective:     Vital Signs (Most Recent):  Temp: 98 °F (36.7 °C) (05/13/22 1129)  Pulse: 82 (05/13/22 1129)  Resp: 20 (05/13/22 1129)  BP: (!) 99/54 (05/13/22 1129)  SpO2: 98 % (05/13/22 1129)   Vital Signs (24h Range):  Temp:  [96.2 °F (35.7 °C)-98 °F (36.7 °C)] 98 °F (36.7 °C)  Pulse:  [67-84] 82  Resp:  [16-20] 20  SpO2:  [92 %-99 %] 98 %  BP: ()/(47-55) 99/54     Weight: 59.4 kg (131 lb)  Body mass index is 23.21 kg/m².    Intake/Output Summary (Last 24 hours) at 5/13/2022 1252  Last data filed at 5/13/2022 0534  Gross per 24 hour   Intake 150 ml   Output 2060 ml   Net -1910 ml      Physical Exam  Constitutional:       Appearance: Normal appearance. She is well-developed.   HENT:      Head: Normocephalic and atraumatic.      Mouth/Throat:      Mouth: Mucous membranes are moist.   Eyes:      " Conjunctiva/sclera: Conjunctivae normal.   Neck:      Comments: No JVD  Cardiovascular:      Rate and Rhythm: Normal rate and regular rhythm.      Heart sounds: No murmur heard.  Pulmonary:      Effort: Pulmonary effort is normal. No respiratory distress.      Breath sounds: Normal breath sounds. No wheezing or rales.      Comments: On 0.5L NC  Abdominal:      General: There is no distension.      Palpations: Abdomen is soft.      Tenderness: There is abdominal tenderness.      Comments: Colostomy bag with minimal stool and gas, incision site c/d/i   Musculoskeletal:      Right lower leg: No edema.      Left lower leg: No edema.      Comments: PICC in RUE   Skin:     General: Skin is warm.   Neurological:      General: No focal deficit present.      Mental Status: She is alert and oriented to person, place, and time.   Psychiatric:      Comments: Appears tired       Significant Labs: All pertinent labs within the past 24 hours have been reviewed.  CBC:   Recent Labs   Lab 05/12/22  1326   WBC 2.52*   HGB 10.7*   HCT 33.0*        CMP:   Recent Labs   Lab 05/12/22  0354 05/13/22  0443   * 132*   K 3.6 3.0*   CL 91* 91*   CO2 33* 33*   GLU 78 84   BUN 15 14   CREATININE 0.6 0.6   CALCIUM 8.6* 8.8   PROT  --  6.7   ALBUMIN  --  1.8*   BILITOT  --  1.3*   ALKPHOS  --  66   AST  --  18   ALT  --  14   ANIONGAP 7* 8   EGFRNONAA >60.0 >60.0       Significant Imaging: I have reviewed all pertinent imaging results/findings within the past 24 hours.      Assessment/Plan:      * Calculus of gallbladder with cholecystitis without biliary obstruction  Acute diverticulitis  Colovesical fistula  · LUQ, N/V for past few months, worsened over the last few days   · US with cholelithiasis, gallbladder wall thickening and pericholecystic fluid.  No sonographic Rasmussen's sign or wall hyperemia.    · HIDA ordered to determine if her cystic duct in patent  · S/p cholecystectomy with general surgery and ex-lap with ostomy  creation with CRS on 5/9  · Cont cefepime and flagyl x 6 days post op   · Plans for voiding trial vs quinones exchange, awaiting CRS input       Acute diverticulitis  · Chronic issue  · Has been on TPN  · CRS following    Urinary tract infection with pyuria  · In April - Pseudomonas / Enterobacteroid acute cystitis without hematuria  · Urine cx positive for MDR VRE. ID following, continue linezolid     Mood disorder  - patient exhibiting signs of depression and anxiety   - psychology consult placed     Dizziness  - reports dizziness on exertion/standing. Possibly related to Bps or multifactorial given acute illness and acute decompensated systolic heart failure. Although now appears to be related to pain meds. Hgb stable       Acute decompensated heart failure  · Combined HF with EF 20%  · Radiographic evidence of pulmonary edema  · BNP ~ 3000  · Continue Coreg 12.5mg BID  · Transitioned to Lasix 40 mg IV BID on 5/9 (uses only prn at home).  · Low sodium diet with 1.5 L fluid restriction  · Strict I&Os, daily weights    - BNP remains elevated but repeat TTE with small IVC, transition to lasix 40mg daily     Results for orders placed during the hospital encounter of 05/06/22    Echo    Interpretation Summary  · The left ventricle is severely enlarged with eccentric hypertrophy and severely decreased systolic function. The estimated ejection fraction is 20%.  · Mild right ventricular enlargement with normal right ventricular systolic function.  · Grade II left ventricular diastolic dysfunction.  · Severe left atrial enlargement.  · Mild-to-moderate mitral regurgitation.  · Mild tricuspid regurgitation.  · The estimated PA systolic pressure is 35 mmHg.  · Normal central venous pressure (3 mmHg).      Anemia  · Stable.   · Blood smear notable for Rouleaux formation      Primary hypertension  · Chronic and stable  · Continue Coreg 12.5mg BID    Familial hypercholesterolemia  Continue Zetia  Might need Niacin    Coronary  artery disease of native artery of native heart with stable angina pectoris  · Chronic and stable  · Continue Aspirin 81mg PO daily  · Continue Coreg 12.5mg PO BID  · Prasugrel held on admission for surgery. Resume when safe from surgical standpoint and f/u her cardiologist outpatient    Automatic implantable cardioverter-defibrillator in situ  · Chronic and stable  · No acute issues      VTE Risk Mitigation (From admission, onward)         Ordered     enoxaparin injection 40 mg  Daily         05/06/22 2358     IP VTE HIGH RISK PATIENT  Once         05/06/22 2358     Place sequential compression device  Until discontinued         05/06/22 2358                            Evens Talley MD  Department of Hospital Medicine   [facility]

## 2022-05-13 NOTE — NURSING
"  Met with patient at the bedside for ostomy lesson # 3. Patient in bed with eyes closed easily aroused to name. Patient noted to be sleepy. This writer asked patient if she would like to change her ostomy pouch and perform her ostomy care. Patient stated, I am sleepy" patient became irritable when this writer attempted to encourage patient  to change her pouch and I wound assist her. Patient unwilling to change pouch today. Will attempt follow up lesson on 5/16/2022.  "

## 2022-05-13 NOTE — PLAN OF CARE
POC reviewed with pt. Pt verbalized understanding.   AAOX'4.   VSS on RA.   Pt tolerating small amounts of food.   C/o nausea treated with scheduled and PRN meds as per MD order.  Midline incision CDI.RLQ PRASAD drain intact.   LLQ colostomy intact with  no output.   Pt refused SCDs  Quinones leaked , MD made aware and replaced the quinones  Worked with OT.  complaints of pain in the tailbone, balsam peru applied , foam applied and air mattress on the bed   Bed in lowest position with call light within reach. WCTM

## 2022-05-13 NOTE — PT/OT/SLP PROGRESS
Occupational Therapy   Treatment    Name: Odette Hart  MRN: [id number]  Admitting Diagnosis:  Calculus of gallbladder with cholecystitis without biliary obstruction  4 Days Post-Op    Recommendations:     Discharge Recommendations: nursing facility, skilled  Discharge Equipment Recommendations:  walker, rolling, bedside commode  Barriers to discharge:  Decreased caregiver support    Assessment:     Odette Hart is a 66 y.o. female with a medical diagnosis of Calculus of gallbladder with cholecystitis without biliary obstruction.  She presents with impaired ADL and mobility performance deficits. Pt found upright in bed requiring encouragement and therapeutic rapport building for session completion. Session focused on bedroom mobility today with pt showing limited activity tolerance and debility. Pt used RW and tolerated stand pivot to bedside chair only this date with min A taking ~8 steps total. Pt fearful of falling and currently is not at her baseline. Pt is a high fall risk and not safe to return home with limited supports at residence. POC updated from  to SNF to improve pt outcomes and increase pt's ability to complete self care tasks and demonstrate safe mobility. Pt would benefit from continued OT skilled services 3x/wk to improve daily living skills to optimize QOL.  Pt is recommended to discharge to SNF at this time.  Performance deficits affecting function are impaired functional mobilty, weakness, impaired endurance, gait instability, impaired self care skills, decreased lower extremity function, decreased upper extremity function, impaired cardiopulmonary response to activity.     Rehab Prognosis:  Good; patient would benefit from acute skilled OT services to address these deficits and reach maximum level of function.       Plan:     Patient to be seen 3 x/week to address the above listed problems via self-care/home management, therapeutic activities, therapeutic exercises  · Plan of Care  Expires: 06/09/22  · Plan of Care Reviewed with: patient    Subjective     Pain/Comfort:  · Pain Rating 1: other (see comments) (pain in abdomen not ranked)  · Location - Orientation 1: generalized  · Location 1: abdomen    Objective:     Communicated with: RN prior to session.  Patient found HOB elevated with telemetry, PRASAD drain, quinones catheter, Other (comments) (ostomy) upon OT entry to room.    General Precautions: Standard, fall   Orthopedic Precautions:N/A   Braces: N/A  Respiratory Status: Nasal cannula, flow 1 L/min     Occupational Performance:     Bed Mobility:    · Patient completed Rolling/Turning to Right with minimum assistance  · Patient completed Scooting/Bridging with minimum assistance  · Patient completed Supine to Sit with minimum assistance     Functional Mobility/Transfers:  · Patient completed Sit <> Stand Transfer with minimum assistance  with  rolling walker   · Patient completed Bed <> Chair Transfer using Stand Pivot technique with minimum assistance with rolling walker  · Functional Mobility: Pt stood and took 6 steps with pivot to bedside chair using RW. Pt with 2 significant posterior leaning episodes with pt crying out in fear however this therapist recovered pt and provided cues for upright posturing and RW management.     Activities of Daily Living:  · Upper Body Dressing: minimum assistance donning gown around back seated   · Lower Body Dressing: total assistance donning  socks       AMPA 6 Click ADL: 15    Treatment & Education:  Pt educated on role of occupational therapy, POC, and safety during ADLs and functional mobility. Pt and OT discussed importance of safe, continued mobility to optimize daily living skills. Pt verbalized understanding. White board updated during session. Pt given instruction to call for medical staff/nurse for assistance.       Patient left up in chair with all lines intact, call button in reach and RN notifiedEducation:      GOALS:   Multidisciplinary  Problems     Occupational Therapy Goals        Problem: Occupational Therapy    Goal Priority Disciplines Outcome Interventions   Occupational Therapy Goal     OT, PT/OT Ongoing, Progressing    Description: Goals to be met by: 5/24/22     Patient will increase functional independence with ADLs by performing:    Feeding with [place].  UE Dressing with [place].  LE Dressing with Stand-by Assistance.  Grooming while standing at sink with Supervision.  Toileting from toilet with Supervision for hygiene and clothing management.   Toilet transfer to toilet with Supervision.  Pt will engage in functional mobility to simulate household distances in order to maximize functional activity tolerance required for engagement in occupations of choice with supervision using LRAD.                    Time Tracking:     OT Date of Treatment: 05/13/22  OT Start Time: 0853  OT Stop Time: 0919  OT Total Time (min): 26 min    Billable Minutes:Self Care/Home Management 14 min  Therapeutic Activity 12 min    OT/FROILAN: OT     FROILAN Visit Number: 0    5/13/2022

## 2022-05-13 NOTE — PLAN OF CARE
"[facility]  Discharge Reassessment    Primary Care Provider: Braxton Barragan MD    Expected Discharge Date: 5/13/2022    Per progress note 5/13: "Interval History: remains tired this morning. Stating "I hope yall are taking care of my infection". Echo with small ivc, transition to oral lasix today. Suspect some of SOB symptoms related to deconditioning/recent surgery and psych component. Will have psychology eval patient today. Also with some lightheadedness after pain meds"    Reassessment (most recent)     Discharge Reassessment - 05/13/22 1350        Discharge Reassessment    Assessment Type Discharge Planning Reassessment     Discharge Plan A Skilled Nursing Facility   Recs are currently SNF - patient is to finish IV ABX course while inpatient, LTAC no longer the plan    Discharge Plan B Home Health     DME Needed Upon Discharge  none     Discharge Barriers Identified None     Why the patient remains in the hospital Requires continued medical care               Nahomy Jalloh RN, CM   Ext: [phone]    "

## 2022-05-13 NOTE — ASSESSMENT & PLAN NOTE
· Combined HF with EF 20%  · Radiographic evidence of pulmonary edema  · BNP ~ 3000  · Continue Coreg 12.5mg BID  · Transitioned to Lasix 40 mg IV BID on 5/9 (uses only prn at home).  · Low sodium diet with 1.5 L fluid restriction  · Strict I&Os, daily weights    - BNP remains elevated but repeat TTE with small IVC, transition to lasix 40mg daily     Results for orders placed during the hospital encounter of 05/06/22    Echo    Interpretation Summary  · The left ventricle is severely enlarged with eccentric hypertrophy and severely decreased systolic function. The estimated ejection fraction is 20%.  · Mild right ventricular enlargement with normal right ventricular systolic function.  · Grade II left ventricular diastolic dysfunction.  · Severe left atrial enlargement.  · Mild-to-moderate mitral regurgitation.  · Mild tricuspid regurgitation.  · The estimated PA systolic pressure is 35 mmHg.  · Normal central venous pressure (3 mmHg).

## 2022-05-13 NOTE — ASSESSMENT & PLAN NOTE
- consult received for evaluation of sacral injury.  - pt transferred from [facility] with LUQ abdominal pain concerning for acute cholecystitis.  - pt underwent cholecystectomy and colostomy placement on 5/9.  - pt seen sitting up to chair on assessment c/o pain to buttocks.  - she also c/o feeling sob and nauseous. RN notified.  - pt assisted back to bed with help of RN.  - sacrum with small area of maroon/purple discoloration with blanchable redness to the periwound. Skin intact, no breakdown noted.  - foam dressing in place. Pt also with indwelling catheter.  - continue BPCO (Venelex) bid/prn.  - currently on Jose surface with waffle overlay.  - will order specialty bed, heel boots and chair cushion.   - nursing to maintain pressure injury prevention measures.  - nursing to continue scheduled wound care per orders.

## 2022-05-13 NOTE — PLAN OF CARE
POC reviewed w/ pt, verbalized understanding. Pt AAOx4. VS stable on 2L NC. On TELE, NSR, some PVC's noticed overnight. IS bedside. Denies chest pain & SOB. SCD's in place. Pt remains free of fall & injury. No acute events. Bed in lowest position, call light in reach, frequent rounds made for safety.    - m/l w DB O2A  - ROSARIO PICC line, 5/7 dressing date  - Sacral spine altered integrity, covered w/ foam  - Cardona in pace d/t retention  - Low fiber/residue diet, poor apetite  - ABX running per MAR  - Nausea managed w/ PRN and schedule antiemetic  - Pain manged w PRN pain meds  - RLQ PRASAD drain, minimal output over night, striped 2x    -* Alteplase ordered for BOTH PICC line lumens d/t no drawback. No success.    Care transferred to oncoming nurse.     Problem: Adult Inpatient Plan of Care  Goal: Optimal Comfort and Wellbeing  Outcome: Ongoing, Progressing     Problem: Infection  Goal: Absence of Infection Signs and Symptoms  Outcome: Ongoing, Progressing     Problem: Fall Injury Risk  Goal: Absence of Fall and Fall-Related Injury  Outcome: Ongoing, Progressing     Problem: Impaired Wound Healing  Goal: Optimal Wound Healing  Outcome: Ongoing, Progressing

## 2022-05-13 NOTE — PLAN OF CARE
Problem: Occupational Therapy  Goal: Occupational Therapy Goal  Description: Goals to be met by: 5/24/22     Patient will increase functional independence with ADLs by performing:    Feeding with [place].  UE Dressing with [place].  LE Dressing with Stand-by Assistance.  Grooming while standing at sink with Supervision.  Toileting from toilet with Supervision for hygiene and clothing management.   Toilet transfer to toilet with Supervision.  Pt will engage in functional mobility to simulate household distances in order to maximize functional activity tolerance required for engagement in occupations of choice with supervision using LRAD.     POC updated from  to SNF 2/2 poor activity tolerance and debility. Continue OT as tolerated.  Pita Hart OT  5/13/2022    Outcome: Ongoing, Progressing

## 2022-05-14 PROBLEM — R94.31 PROLONGED Q-T INTERVAL ON ECG: Status: ACTIVE | Noted: 2022-01-01

## 2022-05-14 NOTE — PROGRESS NOTES
[facility]  Colorectal Surgery  Progress Note    Patient Name: Odette Hart  MRN: [id number]  Admission Date: 5/6/2022  Hospital Length of Stay: 5 days  Attending Physician: Rafa Cardozo MD    Subjective:     Interval History: Patient seen and examined at bedside. NAEON. Continues to complain of nausea, no ostomy output yet. OOB with PT/OT. Tolerating small amounts of PO.    Post-Op Info:  Procedure(s) (LRB):  COLECTOMY, SIGMOID (ERAS High, lithotomy) (N/A)  COLONOSCOPY (N/A)  CYSTOSCOPY,WITH URETERAL CATHETER INSERTION (Bilateral)  CHOLECYSTECTOMY (N/A)  APPENDECTOMY (N/A)  EXCISION, CECUM (N/A)  CREATION, COLOSTOMY   5 Days Post-Op      Medications:  Continuous Infusions:  Scheduled Meds:   acetaminophen  1,000 mg Oral Q8H    aspirin  81 mg Oral Daily    balsam peru-castor oiL   Topical (Top) BID    carvediloL  12.5 mg Oral BID WM    ceFEPime (MAXIPIME) IVPB  2 g Intravenous Q8H    enoxaparin  40 mg Subcutaneous Daily    ezetimibe  10 mg Oral QHS    fluconazole (DIFLUCAN) IV (PEDS and ADULTS)  400 mg Intravenous Q24H    furosemide  40 mg Oral Daily    linezolid  600 mg Intravenous Q12H    metronidazole  500 mg Intravenous Q8H    mupirocin   Nasal BID    pantoprazole  40 mg Oral Daily    polyethylene glycol  17 g Oral Daily    promethazine (PHENERGAN) IVPB  12.5 mg Intravenous Q6H    scopolamine  1 patch Transdermal Q3 Days     PRN Meds:   albuterol-ipratropium    ALPRAZolam    aluminum-magnesium hydroxide-simethicone    dextrose 10%    dextrose 10%    glucagon (human recombinant)    glucose    glucose    HYDROmorphone    insulin aspart U-100    ondansetron    oxyCODONE    oxyCODONE    promethazine (PHENERGAN) IVPB    sodium chloride 0.9%    sodium chloride 0.9%        Objective:     Vital Signs (Most Recent):  Temp: 96.8 °F (36 °C) (05/14/22 0350)  Pulse: 98 (05/14/22 0350)  Resp: 17 (05/14/22 0350)  BP: (!) 115/58 (05/14/22 0350)  SpO2: 96 % (05/14/22 0350)   Vital  Signs (24h Range):  Temp:  [96.8 °F (36 °C)-98.3 °F (36.8 °C)] 96.8 °F (36 °C)  Pulse:  [67-98] 98  Resp:  [16-20] 17  SpO2:  [95 %-98 %] 96 %  BP: ()/(53-58) 115/58     Intake/Output - Last 3 Shifts         05/12 0700 05/13 0659 05/13 0700 05/14 0659 05/14 0700  05/15 0659    P.O. 150      IV Piggyback  4656.4     Total Intake(mL/kg) 150 (2.5) 4656.4 (78.4)     Urine (mL/kg/hr) 2050 (1.4) 2075 (1.5)     Drains 10 0     Stool 0 0     Total Output 2060 2075     Net -1910 +2581.4            Stool Occurrence 0 x 0 x             Physical Exam  Vitals and nursing note reviewed.   Constitutional:       Appearance: She is well-developed. She is not ill-appearing.   HENT:      Head: Normocephalic.   Eyes:      Pupils: Pupils are equal, round, and reactive to light.   Cardiovascular:      Rate and Rhythm: Normal rate and regular rhythm.      Heart sounds: Normal heart sounds.   Pulmonary:      Effort: Pulmonary effort is normal. No respiratory distress.      Breath sounds: Normal breath sounds. No wheezing or rales.   Abdominal:      Palpations: Abdomen is soft. There is no mass.      Tenderness: There is no guarding or rebound.      Comments: Incisions c/d/I, ostomy pink/viable with no output   Skin:     General: Skin is warm and dry.   Neurological:      Mental Status: She is alert and oriented to person, place, and time.   Psychiatric:         Behavior: Behavior normal.         Thought Content: Thought content normal.         Judgment: Judgment normal.           Significant Labs:  BMP (Last 3 Results):   Recent Labs   Lab 05/08/22  0530 05/09/22  0416 05/09/22  1214 05/11/22  0420 05/12/22  0354 05/13/22  0443   GLU 99 83   < > 81 78 84   * 140   < > 131* 131* 132*   K 3.7 3.5   < > 3.2* 3.6 3.0*   CL 94* 97   < > 91* 91* 91*   CO2 30* 35*   < > 31* 33* 33*   BUN 20 12   < > 18 15 14   CREATININE 0.7 0.6   < > 0.7 0.6 0.6   CALCIUM 8.4* 8.5*   < > 8.7 8.6* 8.8   MG 1.6 1.7  --   --   --   --     < > = values  in this interval not displayed.       CBC (Last 3 Results):   Recent Labs   Lab 05/09/22  1214 05/10/22  0508 05/12/22  1326   WBC 1.49* 3.34* 2.52*   RBC 3.99* 4.22 3.87*   HGB 11.0* 11.6* 10.7*   HCT 35.4* 37.9 33.0*    222 211   MCV 89 90 85   MCH 27.6 27.5 27.6   MCHC 31.1* 30.6* 32.4         Significant Diagnostics:  None    Assessment/Plan:     Acute cholecystitis  OR 5/9    Severe malnutrition  Nutrition consulted. Most recent weight and BMI monitored-     Malnutrition Type and Energy Intake  Malnutrition Type: chronic illness  Energy Intake: moderate energy intake    Malnutrition (Moderate to Severe)  Energy Intake (Malnutrition): less than 75% for greater than or equal to 1 month  Enc diet and boost            Measurements:  Wt Readings from Last 1 Encounters:   05/09/22 59.8 kg (131 lb 13.4 oz)   Body mass index is 23.35 kg/m².    Recommendations: Recommendation/Intervention: 1.  Goals: Pt to meet >75% EEN/EPN by RD follow up    Patient has been screened and assessed by RD. RD will follow patient.      Urinary tract infection with pyuria  Consult to ID for hx of UTI with VRE, continuing antibiotics until seen by ID  Appreciate ID input, continue antibiotics 5-7 days post op    Colovesical fistula  Repaired during surgery 5/9  Cardona in place    Primary hypertension  Chronic, controlled.  Latest blood pressure and vitals reviewed-   Temp:  [97.5 °F (36.4 °C)-99.1 °F (37.3 °C)]   Pulse:  []   Resp:  [16-20]   BP: (102-118)/(52-59)   SpO2:  [94 %-97 %] .   Home meds for hypertension were reviewed and noted below.   Hypertension Medications             carvediloL (COREG) 12.5 MG tablet Take 1 tablet (12.5 mg total) by mouth 2 (two) times daily with meals.    nitroGLYCERIN (NITROSTAT) 0.4 MG SL tablet Place 1 tablet (0.4 mg total) under the tongue every 5 (five) minutes as needed for Chest pain.    spironolactone (ALDACTONE) 25 MG tablet Take 1 tablet (25 mg total) by mouth once daily.           While in the hospital, will manage blood pressure as follows; Continue home antihypertensive regimen    Will utilize p.r.n. blood pressure medication only if patient's blood pressure greater than  180/110 and she develops symptoms such as worsening chest pain or shortness of breath.    Coronary artery disease of native artery of native heart with stable angina pectoris  Patient with known CAD s/p stent placement and CABG, which is controlled   Tele  Resume home meds    Acute diverticulitis  OR 5/9 sigmoid colectomy, end colostomy, takedown of fistuli, and cholecystectomy    -await return of bowel function, lfd  -continue multi modality for pain control  -encourage ambulation and use of IS  -phani hose and SCD  -prophalactic lovenox and PPI  -cbc this am, if appropriate will plan to start home effient and stop lovenox  -quinones in place for 8-10 days    Automatic implantable cardioverter-defibrillator in situ  Cont tele  Cont home meds  Monitor vs          Suri Candelario MD  Colorectal Surgery  [facility]

## 2022-05-14 NOTE — PROGRESS NOTES
[facility] Medicine  Progress Note    Patient Name: Odette Hart  MRN: [id number]  Patient Class: IP- Inpatient   Admission Date: 5/6/2022  Length of Stay: 5 days  Attending Physician: Rafa Cardozo MD  Primary Care Provider: Braxton Barragan MD        Subjective:     Principal Problem:Calculus of gallbladder with cholecystitis without biliary obstruction        HPI:  Odette Hart is a 66 y.o. 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 Patient was at [facility] and has been complaining of constant LUQ abdominal pain, poor PO intake, and N/V. She has been having abdominal pain, nausea/vomiting, and inability to tolerate PO since she was diagnosed with diverticulitis. They thought her symptoms were due to flagyl and it was discontinued with improvement, however her symptoms returned 10 days ago. She had a CT of her abdomen on 05/04 which showed improving diverticulitis, persistent colovesicular fistula and distended gallbladder with pericholecystic fluid. She denies any fevers, chills, chest pain, dysuria.    General surgery consulted in ED and recommend HIDA.        Overview/Hospital Course:  Ms. Hart was admitted to Hospital Medicine for management of acute cholecystitis.  Gen Surg was consulted and HIDA was ordered.  CRS was consulted who plan for corrective surgery of her colovesical fistula with ostomy placement on 5/9 with combined cholecystectomy. Transferred to CRS service,  consulted for continued care in setting of ADHF. Started on diuresis with lasix 40mg IV BID, symptoms and oxygen requirements now much improved. Lasix weaned to 40mg IV daily. Repeat echo  unchanged with IVC 3, although BNP remains elevated. transitioned to oral lasix today. Also will have pts outpt psychologist come see her  given her anxiety and depressive symptoms.           Interval History: Patient remains with flat affect this morning and tired off working with PT. She is complaining about nausea and dizziness likely related to pain meds. QTC prolonged on initial ekg, will repeat. Switched to compazine in the mean time. Reports good response to tramadol, will also transition to tramadol if QTC improved. Repeat electrolytes pending. Started on BR for decreased ostomy output. Also will talk with ID about converting to oral abx given 4L in from IV abx yesterday. CBC pending, if hgb stable will restart home antiplt      Review of Systems   Constitutional:  Negative for chills, fatigue and fever.   HENT:  Negative for congestion and rhinorrhea.    Eyes:  Negative for visual disturbance.   Respiratory:  Positive for shortness of breath. Negative for cough.    Cardiovascular:  Negative for chest pain, palpitations and leg swelling.   Gastrointestinal:  Positive for constipation and nausea. Negative for abdominal pain, diarrhea and vomiting.   Genitourinary:  Negative for dysuria and urgency.   Musculoskeletal:  Negative for back pain.   Skin:  Negative for rash.   Neurological:  Positive for light-headedness. Negative for dizziness, syncope, weakness and headaches.   Psychiatric/Behavioral:  Negative for agitation and confusion.    All other systems reviewed and are negative.  Objective:     Vital Signs (Most Recent):  Temp: 98.1 °F (36.7 °C) (05/14/22 0841)  Pulse: 97 (05/14/22 0841)  Resp: 16 (05/14/22 0841)  BP: 112/73 (05/14/22 0841)  SpO2: 96 % (05/14/22 0841) Vital Signs (24h Range):  Temp:  [96.8 °F (36 °C)-98.3 °F (36.8 °C)] 98.1 °F (36.7 °C)  Pulse:  [78-98] 97  Resp:  [16-20] 16  SpO2:  [95 %-98 %] 96 %  BP: ()/(53-73) 112/73     Weight: 59.4 kg (131 lb)  Body mass index is 23.21 kg/m².    Intake/Output Summary (Last 24 hours) at 5/14/2022 0924  Last data filed at 5/14/2022 0604  Gross per 24 hour   Intake 4656.37 ml    Output 2075 ml   Net 2581.37 ml      Physical Exam  Constitutional:       Appearance: Normal appearance. She is well-developed.   HENT:      Head: Normocephalic and atraumatic.      Mouth/Throat:      Mouth: Mucous membranes are moist.   Eyes:      Conjunctiva/sclera: Conjunctivae normal.   Neck:      Comments: No JVD  Cardiovascular:      Rate and Rhythm: Normal rate and regular rhythm.      Heart sounds: No murmur heard.  Pulmonary:      Effort: Pulmonary effort is normal. No respiratory distress.      Breath sounds: Normal breath sounds. No wheezing or rales.      Comments: On 2L NC  Abdominal:      General: There is no distension.      Palpations: Abdomen is soft.      Tenderness: There is abdominal tenderness (improving daily).      Comments: Drain with serosanguinous ouput. Colostomy bag with minimal stool and gas, incision site c/d/i   Musculoskeletal:      Right lower leg: No edema.      Left lower leg: No edema.      Comments: PICC in RUE   Skin:     General: Skin is warm.   Neurological:      General: No focal deficit present.      Mental Status: She is alert and oriented to person, place, and time.   Psychiatric:      Comments: Appears tired       Significant Labs: All pertinent labs within the past 24 hours have been reviewed.  CBC:   Recent Labs   Lab 05/12/22  1326   WBC 2.52*   HGB 10.7*   HCT 33.0*        CMP:   Recent Labs   Lab 05/13/22  0443   *   K 3.0*   CL 91*   CO2 33*   GLU 84   BUN 14   CREATININE 0.6   CALCIUM 8.8   PROT 6.7   ALBUMIN 1.8*   BILITOT 1.3*   ALKPHOS 66   AST 18   ALT 14   ANIONGAP 8   EGFRNONAA >60.0       Significant Imaging: I have reviewed all pertinent imaging results/findings within the past 24 hours.      Assessment/Plan:      * Calculus of gallbladder with cholecystitis without biliary obstruction  Acute diverticulitis  Colovesical fistula  · LUQ, N/V for past few months, worsened over the last few days   · US with cholelithiasis, gallbladder wall  thickening and pericholecystic fluid.  No sonographic Rasmussen's sign or wall hyperemia.    · HIDA ordered to determine if her cystic duct in patent  · S/p cholecystectomy with general surgery and ex-lap with ostomy creation with CRS on 5/9  · Cont cefepime and flagyl x 7 days post op (end date 5/16)    Colovesical fistula  · Continue Cefepime and Flagyl  S/p cholecystectomy with general surgery and ex-lap with ostomy creation with CRS on     Acute diverticulitis  · Chronic issue  · Has been on TPN  · CRS following    Urinary tract infection with pyuria  · In April - Pseudomonas / Enterobacteroid acute cystitis without hematuria  · Urine cx positive for MDR VRE. ID following, continue  linezolid x 7 days (end date 5/16)  · Continue fluconazole for yeast (endate date 5/16)  · Talking with ID about transitioning to oral abx    Prolonged Q-T interval on ECG  QTc 581 on admit  - repeat EKG pending. Switched antiemetics to compazine  - will transition to tramadol for pain meds as well pending repeat EKG   - rechecking electrolytes   - avoid other QT prolonging meds    Alteration in skin integrity  - sacral wound, wound care following       Mood disorder  - patient exhibiting signs of depression and anxiety   - psychology consult placed     Dizziness  - reports dizziness on exertion/standing. Possibly related to Bps or multifactorial given acute illness and acute decompensated systolic heart failure. Although now appears to be related to pain meds. Hgb stable   - may benefit from dose of meclizine if continues and QT okay       Acute decompensated heart failure  · Combined HF with EF 20%  · Radiographic evidence of pulmonary edema  · BNP ~ 3000  · Continue Coreg 12.5mg BID  · Holding further diuresis for now  · Low sodium diet with 1.5 L fluid restriction  · Strict I&Os, daily weights    - BNP remains elevated but repeat TTE with small IVC, volume overload resolved    Results for orders placed during the hospital encounter  of 05/06/22    Echo    Interpretation Summary  · The left ventricle is severely enlarged with eccentric hypertrophy and severely decreased systolic function. The estimated ejection fraction is 20%.  · Mild right ventricular enlargement with normal right ventricular systolic function.  · Grade II left ventricular diastolic dysfunction.  · Severe left atrial enlargement.  · Mild-to-moderate mitral regurgitation.  · Mild tricuspid regurgitation.  · The estimated PA systolic pressure is 35 mmHg.  · Normal central venous pressure (3 mmHg).        Anemia  · Stable.   · Blood smear notable for Rouleaux formation      Acute cholecystitis  · As above      Primary hypertension  · Chronic and stable  · Continue Coreg 12.5mg BID    Familial hypercholesterolemia  Continue Zetia  Might need Niacin  ? Not on statin, unclear why    Coronary artery disease of native artery of native heart with stable angina pectoris  · Chronic and stable  · Continue Aspirin 81mg PO daily  · Continue Coreg 12.5mg PO BID  · Prasugrel restarted 5/14    Automatic implantable cardioverter-defibrillator in situ  · Chronic and stable  · No acute issues      VTE Risk Mitigation (From admission, onward)         Ordered     IP VTE HIGH RISK PATIENT  Once         05/06/22 1930     Place sequential compression device  Until discontinued         05/06/22 3871                        Evens Talley MD  Department of Hospital Medicine   [facility]

## 2022-05-14 NOTE — ASSESSMENT & PLAN NOTE
OR 5/9 sigmoid colectomy, end colostomy, takedown of fistuli, and cholecystectomy    -await return of bowel function, lfd  -continue multi modality for pain control  -encourage ambulation and use of IS  -phani hose and SCD  -prophalactic lovenox and PPI  -cbc this am, if appropriate will plan to start home effient and stop lovenox  -quinones in place for 8-10 days

## 2022-05-14 NOTE — ASSESSMENT & PLAN NOTE
· Chronic and stable  · Continue Aspirin 81mg PO daily  · Continue Coreg 12.5mg PO BID  · Prasugrel restarted 5/14

## 2022-05-14 NOTE — ASSESSMENT & PLAN NOTE
· Combined HF with EF 20%  · Radiographic evidence of pulmonary edema  · BNP ~ 3000  · Continue Coreg 12.5mg BID  · Holding further diuresis for now  · Low sodium diet with 1.5 L fluid restriction  · Strict I&Os, daily weights    - BNP remains elevated but repeat TTE with small IVC, volume overload resolved    Results for orders placed during the hospital encounter of 05/06/22    Echo    Interpretation Summary  · The left ventricle is severely enlarged with eccentric hypertrophy and severely decreased systolic function. The estimated ejection fraction is 20%.  · Mild right ventricular enlargement with normal right ventricular systolic function.  · Grade II left ventricular diastolic dysfunction.  · Severe left atrial enlargement.  · Mild-to-moderate mitral regurgitation.  · Mild tricuspid regurgitation.  · The estimated PA systolic pressure is 35 mmHg.  · Normal central venous pressure (3 mmHg).

## 2022-05-14 NOTE — ASSESSMENT & PLAN NOTE
· In April - Pseudomonas / Enterobacteroid acute cystitis without hematuria  · Urine cx positive for MDR VRE. ID following, continue  linezolid x 7 days (end date 5/16)  · Continue fluconazole for yeast (endate date 5/16)  · Talking with ID about transitioning to oral abx

## 2022-05-14 NOTE — ASSESSMENT & PLAN NOTE
Acute diverticulitis  Colovesical fistula  · LUQ, N/V for past few months, worsened over the last few days   · US with cholelithiasis, gallbladder wall thickening and pericholecystic fluid.  No sonographic Rasmussen's sign or wall hyperemia.    · HIDA ordered to determine if her cystic duct in patent  · S/p cholecystectomy with general surgery and ex-lap with ostomy creation with CRS on 5/9  · Cont cefepime and flagyl x 7 days post op (end date 5/16)

## 2022-05-14 NOTE — PLAN OF CARE
POC reviewed with patient who verbalized understanding. VSS on 2L NC. AAOX4. Remains free of falls and injury. Patient up to side of bed, refused to move to chair or ambulate due to weakness. Frequent weight shift encouraged.     - ML w/DB  - sacral wound, care per order  - RLQ PRASAD  - Colostomy, no flatus or stool  - R PICC  - quinones to gravity     Poor tolerance of diet, no appetite.  Nausea moderately controlled with PRN medications. Pain controlled with PRN medications per MAR. Telemetry being monitored running NSR. Patient denies chest pain & SOB. No acute events. No distress noted. Bed in lowest position, call light within reach, frequent rounds made for safety.     WCTM

## 2022-05-14 NOTE — SUBJECTIVE & OBJECTIVE
Subjective:     Interval History: Patient seen and examined at bedside. NAEON. Continues to complain of nausea, no ostomy output yet. OOB with PT/OT. Tolerating small amounts of PO.    Post-Op Info:  Procedure(s) (LRB):  COLECTOMY, SIGMOID (ERAS High, lithotomy) (N/A)  COLONOSCOPY (N/A)  CYSTOSCOPY,WITH URETERAL CATHETER INSERTION (Bilateral)  CHOLECYSTECTOMY (N/A)  APPENDECTOMY (N/A)  EXCISION, CECUM (N/A)  CREATION, COLOSTOMY   5 Days Post-Op      Medications:  Continuous Infusions:  Scheduled Meds:   acetaminophen  1,000 mg Oral Q8H    aspirin  81 mg Oral Daily    balsam peru-castor oiL   Topical (Top) BID    carvediloL  12.5 mg Oral BID WM    ceFEPime (MAXIPIME) IVPB  2 g Intravenous Q8H    enoxaparin  40 mg Subcutaneous Daily    ezetimibe  10 mg Oral QHS    fluconazole (DIFLUCAN) IV (PEDS and ADULTS)  400 mg Intravenous Q24H    furosemide  40 mg Oral Daily    linezolid  600 mg Intravenous Q12H    metronidazole  500 mg Intravenous Q8H    mupirocin   Nasal BID    pantoprazole  40 mg Oral Daily    polyethylene glycol  17 g Oral Daily    promethazine (PHENERGAN) IVPB  12.5 mg Intravenous Q6H    scopolamine  1 patch Transdermal Q3 Days     PRN Meds:   albuterol-ipratropium    ALPRAZolam    aluminum-magnesium hydroxide-simethicone    dextrose 10%    dextrose 10%    glucagon (human recombinant)    glucose    glucose    HYDROmorphone    insulin aspart U-100    ondansetron    oxyCODONE    oxyCODONE    promethazine (PHENERGAN) IVPB    sodium chloride 0.9%    sodium chloride 0.9%        Objective:     Vital Signs (Most Recent):  Temp: 96.8 °F (36 °C) (05/14/22 0350)  Pulse: 98 (05/14/22 0350)  Resp: 17 (05/14/22 0350)  BP: (!) 115/58 (05/14/22 0350)  SpO2: 96 % (05/14/22 0350)   Vital Signs (24h Range):  Temp:  [96.8 °F (36 °C)-98.3 °F (36.8 °C)] 96.8 °F (36 °C)  Pulse:  [67-98] 98  Resp:  [16-20] 17  SpO2:  [95 %-98 %] 96 %  BP: ()/(53-58) 115/58     Intake/Output - Last 3 Shifts         05/12 0700  05/13 0659  05/13 0700  05/14 0659 05/14 0700  05/15 0659    P.O. 150      IV Piggyback  4656.4     Total Intake(mL/kg) 150 (2.5) 4656.4 (78.4)     Urine (mL/kg/hr) 2050 (1.4) 2075 (1.5)     Drains 10 0     Stool 0 0     Total Output 2060 2075     Net -1910 +2581.4            Stool Occurrence 0 x 0 x             Physical Exam  Vitals and nursing note reviewed.   Constitutional:       Appearance: She is well-developed. She is not ill-appearing.   HENT:      Head: Normocephalic.   Eyes:      Pupils: Pupils are equal, round, and reactive to light.   Cardiovascular:      Rate and Rhythm: Normal rate and regular rhythm.      Heart sounds: Normal heart sounds.   Pulmonary:      Effort: Pulmonary effort is normal. No respiratory distress.      Breath sounds: Normal breath sounds. No wheezing or rales.   Abdominal:      Palpations: Abdomen is soft. There is no mass.      Tenderness: There is no guarding or rebound.      Comments: Incisions c/d/I, ostomy pink/viable with no output   Skin:     General: Skin is warm and dry.   Neurological:      Mental Status: She is alert and oriented to person, place, and time.   Psychiatric:         Behavior: Behavior normal.         Thought Content: Thought content normal.         Judgment: Judgment normal.           Significant Labs:  BMP (Last 3 Results):   Recent Labs   Lab 05/08/22  0530 05/09/22  0416 05/09/22  1214 05/11/22  0420 05/12/22  0354 05/13/22  0443   GLU 99 83   < > 81 78 84   * 140   < > 131* 131* 132*   K 3.7 3.5   < > 3.2* 3.6 3.0*   CL 94* 97   < > 91* 91* 91*   CO2 30* 35*   < > 31* 33* 33*   BUN 20 12   < > 18 15 14   CREATININE 0.7 0.6   < > 0.7 0.6 0.6   CALCIUM 8.4* 8.5*   < > 8.7 8.6* 8.8   MG 1.6 1.7  --   --   --   --     < > = values in this interval not displayed.       CBC (Last 3 Results):   Recent Labs   Lab 05/09/22  1214 05/10/22  0508 05/12/22  1326   WBC 1.49* 3.34* 2.52*   RBC 3.99* 4.22 3.87*   HGB 11.0* 11.6* 10.7*   HCT 35.4* 37.9 33.0*    222 211    MCV 89 90 85   MCH 27.6 27.5 27.6   MCHC 31.1* 30.6* 32.4         Significant Diagnostics:  None

## 2022-05-14 NOTE — PLAN OF CARE
POC reviewed w/ pt, verbalized understanding. Pt AAOx4. VS stable on 2L NC. On TELE, NSR, some PVC's noticed overnight. IS bedside. Denies chest pain & SOB. SCD's in place. Pt remains free of fall & injury. No acute events. Bed in lowest position, call light in reach, frequent rounds made for safety.     - m/l w DB O2A  - ROSARIO PICC line, 5/7 dressing date  - Sacral spine altered integrity, covered w/ foam  - Cardona in pace d/t retention  - Low fiber/residue diet, poor apetite  - ABX running per MAR  - Nausea managed w/ PRN and schedule antiemetic were refused  - Pain manged w PRN pain meds  - RLQ PRASAD drain, minimal output over night, striped 2x          Care transferred to oncoming nurse.      Problem: Adult Inpatient Plan of Care  Goal: Optimal Comfort and Wellbeing  Outcome: Ongoing, Progressing     Problem: Infection  Goal: Absence of Infection Signs and Symptoms  Outcome: Ongoing, Progressing     Problem: Fall Injury Risk  Goal: Absence of Fall and Fall-Related Injury  Outcome: Ongoing, Progressing     Problem: Impaired Wound Healing  Goal: Optimal Wound Healing  Outcome: Ongoing, Progressing

## 2022-05-14 NOTE — ASSESSMENT & PLAN NOTE
QTc 581 on admit  - repeat EKG pending. Switched antiemetics to compazine  - will transition to tramadol for pain meds as well pending repeat EKG   - rechecking electrolytes   - avoid other QT prolonging meds

## 2022-05-14 NOTE — ASSESSMENT & PLAN NOTE
- reports dizziness on exertion/standing. Possibly related to Bps or multifactorial given acute illness and acute decompensated systolic heart failure. Although now appears to be related to pain meds. Hgb stable   - may benefit from dose of meclizine if continues and QT okay

## 2022-05-14 NOTE — SUBJECTIVE & OBJECTIVE
Interval History: Patient remains with flat affect this morning and tired off working with PT. She is complaining about nausea and dizziness likely related to pain meds. QTC prolonged on initial ekg, will repeat. Switched to compazine in the mean time. Reports good response to tramadol, will also transition to tramadol if QTC improved. Repeat electrolytes pending. Started on BR for decreased ostomy output. Also will talk with ID about converting to oral abx given 4L in from IV abx yesterday. CBC pending, if hgb stable will restart home antiplt      Review of Systems   Constitutional:  Negative for chills, fatigue and fever.   HENT:  Negative for congestion and rhinorrhea.    Eyes:  Negative for visual disturbance.   Respiratory:  Positive for shortness of breath. Negative for cough.    Cardiovascular:  Negative for chest pain, palpitations and leg swelling.   Gastrointestinal:  Positive for constipation and nausea. Negative for abdominal pain, diarrhea and vomiting.   Genitourinary:  Negative for dysuria and urgency.   Musculoskeletal:  Negative for back pain.   Skin:  Negative for rash.   Neurological:  Positive for light-headedness. Negative for dizziness, syncope, weakness and headaches.   Psychiatric/Behavioral:  Negative for agitation and confusion.    All other systems reviewed and are negative.  Objective:     Vital Signs (Most Recent):  Temp: 98.1 °F (36.7 °C) (05/14/22 0841)  Pulse: 97 (05/14/22 0841)  Resp: 16 (05/14/22 0841)  BP: 112/73 (05/14/22 0841)  SpO2: 96 % (05/14/22 0841) Vital Signs (24h Range):  Temp:  [96.8 °F (36 °C)-98.3 °F (36.8 °C)] 98.1 °F (36.7 °C)  Pulse:  [78-98] 97  Resp:  [16-20] 16  SpO2:  [95 %-98 %] 96 %  BP: ()/(53-73) 112/73     Weight: 59.4 kg (131 lb)  Body mass index is 23.21 kg/m².    Intake/Output Summary (Last 24 hours) at 5/14/2022 0924  Last data filed at 5/14/2022 0604  Gross per 24 hour   Intake 4656.37 ml   Output 2075 ml   Net 2581.37 ml      Physical  Exam  Constitutional:       Appearance: Normal appearance. She is well-developed.   HENT:      Head: Normocephalic and atraumatic.      Mouth/Throat:      Mouth: Mucous membranes are moist.   Eyes:      Conjunctiva/sclera: Conjunctivae normal.   Neck:      Comments: No JVD  Cardiovascular:      Rate and Rhythm: Normal rate and regular rhythm.      Heart sounds: No murmur heard.  Pulmonary:      Effort: Pulmonary effort is normal. No respiratory distress.      Breath sounds: Normal breath sounds. No wheezing or rales.      Comments: On 2L NC  Abdominal:      General: There is no distension.      Palpations: Abdomen is soft.      Tenderness: There is abdominal tenderness (improving daily).      Comments: Drain with serosanguinous ouput. Colostomy bag with minimal stool and gas, incision site c/d/i   Musculoskeletal:      Right lower leg: No edema.      Left lower leg: No edema.      Comments: PICC in RUE   Skin:     General: Skin is warm.   Neurological:      General: No focal deficit present.      Mental Status: She is alert and oriented to person, place, and time.   Psychiatric:      Comments: Appears tired       Significant Labs: All pertinent labs within the past 24 hours have been reviewed.  CBC:   Recent Labs   Lab 05/12/22  1326   WBC 2.52*   HGB 10.7*   HCT 33.0*        CMP:   Recent Labs   Lab 05/13/22  0443   *   K 3.0*   CL 91*   CO2 33*   GLU 84   BUN 14   CREATININE 0.6   CALCIUM 8.8   PROT 6.7   ALBUMIN 1.8*   BILITOT 1.3*   ALKPHOS 66   AST 18   ALT 14   ANIONGAP 8   EGFRNONAA >60.0       Significant Imaging: I have reviewed all pertinent imaging results/findings within the past 24 hours.

## 2022-05-15 PROBLEM — E87.1 HYPONATREMIA: Status: ACTIVE | Noted: 2022-01-01

## 2022-05-15 NOTE — ASSESSMENT & PLAN NOTE
OR 5/9 sigmoid colectomy, end colostomy, takedown of fistuli, and cholecystectomy    -await return of bowel function, lfd  -continue multi modality for pain control  -encourage ambulation and use of IS  -phani hose and SCD  -prophalactic PPI  -home effient resumed  -quinones in place for 8-10 days

## 2022-05-15 NOTE — PROGRESS NOTES
Pt refused nightly tylenol and ezetimibe for cholesterol due to nausea. Pt also does not want to use SCD's d/t pain on legs w/ compression.    Experiment response team notified to look over pt chart d/t pt having abnormal labs and being symptomatic. Anxiety medication administered d/t pt experiencing some episodes of feeling winded despite having normal oxygen sat's and  Clear lungs sounds all over.    2:00am update - pt currently sleeping, no SOB or tachypnea.    Will continue to monitor.

## 2022-05-15 NOTE — ASSESSMENT & PLAN NOTE
· In April - Pseudomonas / Enterobacteroid acute cystitis without hematuria  · Urine cx positive for MDR VRE. ID following, continue  linezolid x 7 days (end date 5/16)  · Continue fluconazole for yeast (endate date 5/16)

## 2022-05-15 NOTE — ASSESSMENT & PLAN NOTE
- urine studies and exam consistent with hypovolemic hyponatremia  - giving NS and trending BMPs

## 2022-05-15 NOTE — PROGRESS NOTES
[facility]  Colorectal Surgery  Progress Note    Patient Name: Odette Hart  MRN: [id number]  Admission Date: 5/6/2022  Hospital Length of Stay: 6 days  Attending Physician: Rafa Cardozo MD    Subjective:     Interval History: Patient seen and examined at bedside. NAEON. Nausea mildly improved, no ostomy output yet.     Post-Op Info:  Procedure(s) (LRB):  COLECTOMY, SIGMOID (ERAS High, lithotomy) (N/A)  COLONOSCOPY (N/A)  CYSTOSCOPY,WITH URETERAL CATHETER INSERTION (Bilateral)  CHOLECYSTECTOMY (N/A)  APPENDECTOMY (N/A)  EXCISION, CECUM (N/A)  CREATION, COLOSTOMY   6 Days Post-Op      Medications:  Continuous Infusions:  Scheduled Meds:   acetaminophen  1,000 mg Oral Q8H    aspirin  81 mg Oral Daily    balsam peru-castor oiL   Topical (Top) BID    carvediloL  12.5 mg Oral BID WM    ceFEPime (MAXIPIME) IVPB  2 g Intravenous Q8H    ezetimibe  10 mg Oral QHS    fluconazole  400 mg Oral Q24H    linezolid  600 mg Oral Q12H    metroNIDAZOLE  500 mg Oral Q8H    pantoprazole  40 mg Oral Daily    polyethylene glycol  17 g Oral Daily    prasugreL  10 mg Oral Daily    scopolamine  1 patch Transdermal Q3 Days     PRN Meds:   albuterol-ipratropium    ALPRAZolam    aluminum-magnesium hydroxide-simethicone    dextrose 10%    dextrose 10%    glucagon (human recombinant)    glucose    glucose    HYDROmorphone    insulin aspart U-100    oxyCODONE    prochlorperazine    sodium chloride 0.9%    sodium chloride 0.9%        Objective:     Vital Signs (Most Recent):  Temp: 98.1 °F (36.7 °C) (05/15/22 0746)  Pulse: 85 (05/15/22 0746)  Resp: 16 (05/15/22 0746)  BP: (!) 106/55 (05/15/22 0746)  SpO2: 99 % (05/15/22 0746)   Vital Signs (24h Range):  Temp:  [97.3 °F (36.3 °C)-99.2 °F (37.3 °C)] 98.1 °F (36.7 °C)  Pulse:  [] 85  Resp:  [16-18] 16  SpO2:  [93 %-99 %] 99 %  BP: ()/(53-73) 106/55     Intake/Output - Last 3 Shifts         05/13 0700  05/14 0659 05/14 0700  05/15 0659 05/15  0700  05/16 0659    P.O.  360     I.V. (mL/kg)  49.4 (0.8)     IV Piggyback 4656.4 898.9     Total Intake(mL/kg) 4656.4 (78.4) 1308.3 (22)     Urine (mL/kg/hr) 2075 (1.5) 900 (0.6)     Emesis/NG output  0     Drains 0 5     Stool 0 0     Total Output 2075 905     Net +2581.4 +403.3            Urine Occurrence  0 x     Stool Occurrence 0 x 0 x     Emesis Occurrence  0 x             Physical Exam  Vitals and nursing note reviewed.   Constitutional:       Appearance: She is well-developed. She is not ill-appearing.   HENT:      Head: Normocephalic.   Eyes:      Pupils: Pupils are equal, round, and reactive to light.   Cardiovascular:      Rate and Rhythm: Normal rate and regular rhythm.      Heart sounds: Normal heart sounds.   Pulmonary:      Effort: Pulmonary effort is normal. No respiratory distress.      Breath sounds: Normal breath sounds. No wheezing or rales.   Abdominal:      Palpations: Abdomen is soft. There is no mass.      Tenderness: There is no guarding or rebound.      Comments: Incisions c/d/I, ostomy pink/viable with no output   Skin:     General: Skin is warm and dry.   Neurological:      Mental Status: She is alert and oriented to person, place, and time.   Psychiatric:         Behavior: Behavior normal.         Thought Content: Thought content normal.         Judgment: Judgment normal.           Significant Labs:  BMP (Last 3 Results):   Recent Labs   Lab 05/09/22  0416 05/09/22  1214 05/13/22  0443 05/14/22  0850 05/14/22  1529   GLU 83   < > 84 105 107      < > 132* 129* 129*   K 3.5   < > 3.0* 2.7* 3.9   CL 97   < > 91* 89* 89*   CO2 35*   < > 33* 31* 31*   BUN 12   < > 14 11 11   CREATININE 0.6   < > 0.6 0.6 0.6   CALCIUM 8.5*   < > 8.8 8.4* 8.2*   MG 1.7  --   --  1.5*  --     < > = values in this interval not displayed.       CBC (Last 3 Results):   Recent Labs   Lab 05/10/22  0508 05/12/22  1326 05/14/22  0850   WBC 3.34* 2.52* 3.76*   RBC 4.22 3.87* 4.00   HGB 11.6* 10.7* 11.0*   HCT  37.9 33.0* 34.2*    211 285   MCV 90 85 86   MCH 27.5 27.6 27.5   MCHC 30.6* 32.4 32.2         Significant Diagnostics:  None    Assessment/Plan:     Acute cholecystitis  OR 5/9    Urinary tract infection with pyuria  Consult to ID for hx of UTI with VRE, continuing antibiotics until seen by ID  Appreciate ID input, continue antibiotics 5-7 days post op    Colovesical fistula  Repaired during surgery 5/9  Quinones in place  Abx per ID, transitioned to PO due to high volume with IV, completion date 5/16    Primary hypertension  Chronic, controlled.  Latest blood pressure and vitals reviewed-   Temp:  [97.5 °F (36.4 °C)-99.1 °F (37.3 °C)]   Pulse:  []   Resp:  [16-20]   BP: (102-118)/(52-59)   SpO2:  [94 %-97 %] .   Home meds for hypertension were reviewed and noted below.   Hypertension Medications             carvediloL (COREG) 12.5 MG tablet Take 1 tablet (12.5 mg total) by mouth 2 (two) times daily with meals.    nitroGLYCERIN (NITROSTAT) 0.4 MG SL tablet Place 1 tablet (0.4 mg total) under the tongue every 5 (five) minutes as needed for Chest pain.    spironolactone (ALDACTONE) 25 MG tablet Take 1 tablet (25 mg total) by mouth once daily.          While in the hospital, will manage blood pressure as follows; Continue home antihypertensive regimen    Will utilize p.r.n. blood pressure medication only if patient's blood pressure greater than  180/110 and she develops symptoms such as worsening chest pain or shortness of breath.    Coronary artery disease of native artery of native heart with stable angina pectoris  Patient with known CAD s/p stent placement and CABG, which is controlled   Tele  Resume home meds    Acute diverticulitis  OR 5/9 sigmoid colectomy, end colostomy, takedown of fistuli, and cholecystectomy    -await return of bowel function, lfd  -continue multi modality for pain control  -encourage ambulation and use of IS  -phani hose and SCD  -prophalactic PPI  -home effient resumed  -quinones in  place for 8-10 days    Automatic implantable cardioverter-defibrillator in situ  Cont tele  Cont home meds  Monitor vs          Suri Candelario MD  Colorectal Surgery  [facility]

## 2022-05-15 NOTE — PROGRESS NOTES
"[facility]  Progress Note    Patient Name: Odette Hart  MRN: [id number]  Patient Class: IP- Inpatient   Admission Date: 5/6/2022  Length of Stay: 6 days  Attending Physician: Rafa Cardozo MD  Primary Care Provider: Braxton Barragan MD        Subjective:     Principal Problem:Calculus of gallbladder with cholecystitis without biliary obstruction        HPI:  Odette Hart is a 66 y.o. 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 Patient was at [facility] and has been complaining of constant LUQ abdominal pain, poor PO intake, and N/V. She has been having abdominal pain, nausea/vomiting, and inability to tolerate PO since she was diagnosed with diverticulitis. They thought her symptoms were due to flagyl and it was discontinued with improvement, however her symptoms returned 10 days ago. She had a CT of her abdomen on 05/04 which showed improving diverticulitis, persistent colovesicular fistula and distended gallbladder with pericholecystic fluid. She denies any fevers, chills, chest pain, dysuria.    General surgery consulted in ED and recommend HIDA.        Overview/Hospital Course:  Ms. Hart was admitted to Hospital Medicine for management of acute cholecystitis.  Gen Surg was consulted and HIDA was ordered.  CRS was consulted who plan for corrective surgery of her colovesical fistula with ostomy placement on 5/9 with combined cholecystectomy. Transferred to CRS service,  consulted for continued care in setting of ADHF. Started on diuresis with lasix 40mg IV BID, symptoms and oxygen requirements now much improved. Repeat echo unchanged with IVC of 3cm so lasix stopped. Now with decreased UOP and downtrending sodium. Will given gentle IVFs          Interval History: patient complaining of "head " "not feeling right" likely secondary to worsening hypoNa. Suspect volume down. Giving NS today and trending BMPs. Also reports fluttering. Tele with some ectopy, repleating electrolytes. EKG pending     Review of Systems   Constitutional:  Negative for chills, fatigue and fever.   HENT:  Negative for congestion and rhinorrhea.    Eyes:  Negative for visual disturbance.   Respiratory:  Positive for shortness of breath. Negative for cough.    Cardiovascular:  Negative for chest pain, palpitations and leg swelling.   Gastrointestinal:  Positive for constipation and nausea. Negative for abdominal pain, diarrhea and vomiting.   Genitourinary:  Negative for dysuria and urgency.   Musculoskeletal:  Negative for back pain.   Skin:  Negative for rash.   Neurological:  Positive for light-headedness. Negative for dizziness, syncope, weakness and headaches.   Psychiatric/Behavioral:  Negative for agitation and confusion.    All other systems reviewed and are negative.  Objective:     Vital Signs (Most Recent):  Temp: 98.1 °F (36.7 °C) (05/15/22 1303)  Pulse: 70 (05/15/22 1303)  Resp: 20 (05/15/22 1303)  BP: (!) 82/51 (05/15/22 1303)  SpO2: 96 % (05/15/22 1303)   Vital Signs (24h Range):  Temp:  [97.3 °F (36.3 °C)-99.2 °F (37.3 °C)] 98.1 °F (36.7 °C)  Pulse:  [] 70  Resp:  [16-20] 20  SpO2:  [94 %-99 %] 96 %  BP: ()/(51-58) 82/51     Weight: 59.4 kg (131 lb)  Body mass index is 23.21 kg/m².    Intake/Output Summary (Last 24 hours) at 5/15/2022 1426  Last data filed at 5/15/2022 1255  Gross per 24 hour   Intake 1164.83 ml   Output 590 ml   Net 574.83 ml      Physical Exam  Constitutional:       Appearance: Normal appearance. She is well-developed.   HENT:      Head: Normocephalic and atraumatic.      Mouth/Throat:      Mouth: Mucous membranes are moist.   Eyes:      Conjunctiva/sclera: Conjunctivae normal.   Neck:      Comments: No JVD  Cardiovascular:      Rate and Rhythm: Normal rate and regular rhythm.      Heart " sounds: No murmur heard.  Pulmonary:      Effort: Pulmonary effort is normal. No respiratory distress.      Breath sounds: Normal breath sounds. No wheezing or rales.      Comments: On RA  Abdominal:      General: There is no distension.      Palpations: Abdomen is soft.      Tenderness: There is abdominal tenderness (improving daily).      Comments: Drain with serosanguinous ouput. Colostomy bag with minimal stool and gas, incision site c/d/i   Musculoskeletal:      Right lower leg: No edema.      Left lower leg: No edema.      Comments: PICC in RUE   Skin:     General: Skin is warm.   Neurological:      General: No focal deficit present.      Mental Status: She is alert and oriented to person, place, and time.      Comments: Appears more tired today, muttered speech this afternoon        Significant Labs: All pertinent labs within the past 24 hours have been reviewed.  CBC:   Recent Labs   Lab 05/14/22  0850 05/15/22  0636   WBC 3.76* 6.39   HGB 11.0* 11.3*   HCT 34.2* 36.4*    232     CMP:   Recent Labs   Lab 05/14/22  0850 05/14/22  1529 05/15/22  0815 05/15/22  1029 05/15/22  1236   *   < > 126* 127* 127*   K 2.7*   < > 3.5 3.2* 3.3*   CL 89*   < > 88* 89* 88*   CO2 31*   < > 29 29 29      < > 101 148* 151*   BUN 11   < > 14 14 15   CREATININE 0.6   < > 0.7 0.7 0.7   CALCIUM 8.4*   < > 8.3* 8.4* 8.5*   PROT 6.7  --  6.8  --   --    ALBUMIN 1.8*  --  1.8*  --   --    BILITOT 1.0  --  1.2*  --   --    ALKPHOS 80  --  93  --   --    AST 19  --  25  --   --    ALT 13  --  12  --   --    ANIONGAP 9   < > 9 9 10   EGFRNONAA >60.0   < > >60.0 >60.0 >60.0    < > = values in this interval not displayed.       Significant Imaging: I have reviewed all pertinent imaging results/findings within the past 24 hours.      Assessment/Plan:      * Calculus of gallbladder with cholecystitis without biliary obstruction  Acute diverticulitis  Colovesical fistula  · LUQ, N/V for past few months, worsened over the  last few days   · US with cholelithiasis, gallbladder wall thickening and pericholecystic fluid.  No sonographic Rasmussen's sign or wall hyperemia.    · HIDA ordered to determine if her cystic duct in patent  · S/p cholecystectomy with general surgery and ex-lap with ostomy creation with CRS on 5/9  · Cont cefepime and flagyl x 7 days post op (end date 5/16)    Urinary tract infection with pyuria  · In April - Pseudomonas / Enterobacteroid acute cystitis without hematuria  · Urine cx positive for MDR VRE. ID following, continue  linezolid x 7 days (end date 5/16)  · Continue fluconazole for yeast (endate date 5/16)    Hyponatremia  - urine studies and exam consistent with hypovolemic hyponatremia  - s/p 500cc bolus this AM, started on NS infusion 75cc/hr x 12 hours  -trending BMPs q6hr  - goal is no more than 8meq/24hr, please stop IVFs if correcting too quickly.       Prolonged Q-T interval on ECG  QTc 581 on admit  - repeat EKG pending. Switched antiemetics to compazine  - will transition to tramadol for pain meds as well pending repeat EKG   - rechecking electrolytes   - avoid other QT prolonging meds    Alteration in skin integrity  - sacral wound, wound care following       Mood disorder  - patient exhibiting signs of depression and anxiety   - psychology consult placed     Dizziness  - reports dizziness on exertion/standing. Possibly related to Bps or multifactorial given acute illness and acute decompensated systolic heart failure. Although now appears to be related to pain meds. Hgb stable   - may benefit from dose of meclizine if continues and QT okay       Acute decompensated heart failure  · Combined HF with EF 20%  · Radiographic evidence of pulmonary edema  · BNP ~ 3000  · Continue Coreg 12.5mg BID  · Holding further diuresis for now  · Low sodium diet with 1.5 L fluid restriction  · Strict I&Os, daily weights    - BNP remains elevated but repeat TTE with small IVC, volume overload resolved    Results for  orders placed during the hospital encounter of 05/06/22    Echo    Interpretation Summary  · The left ventricle is severely enlarged with eccentric hypertrophy and severely decreased systolic function. The estimated ejection fraction is 20%.  · Mild right ventricular enlargement with normal right ventricular systolic function.  · Grade II left ventricular diastolic dysfunction.  · Severe left atrial enlargement.  · Mild-to-moderate mitral regurgitation.  · Mild tricuspid regurgitation.  · The estimated PA systolic pressure is 35 mmHg.  · Normal central venous pressure (3 mmHg).        Anemia  · Stable.   · Blood smear notable for Rouleaux formation      Acute cholecystitis  · As above      Primary hypertension  · Chronic and stable  · Continue Coreg 12.5mg BID    Familial hypercholesterolemia  Continue Zetia  Might need Niacin    Coronary artery disease of native artery of native heart with stable angina pectoris  · Chronic and stable  · Continue Aspirin 81mg PO daily  · Continue Coreg 12.5mg PO BID  · Prasugrel restarted 5/14    Automatic implantable cardioverter-defibrillator in situ  · Chronic and stable  · No acute issues      VTE Risk Mitigation (From admission, onward)         Ordered     IP VTE HIGH RISK PATIENT  Once         05/06/22 1471     Place sequential compression device  Until discontinued         05/06/22 5597                      Evens Talley MD  Department of Hospital Medicine   [facility]

## 2022-05-15 NOTE — PROGRESS NOTES
Pt put out 150 ml of concentrated urine throughout shift. MD lincoln notified. MD's will round on pt and decide if interventions are necessary given the pt's heart history.    wctm

## 2022-05-15 NOTE — ASSESSMENT & PLAN NOTE
Repaired during surgery 5/9  Cardona in place  Abx per ID, transitioned to PO due to high volume with IV, completion date 5/16

## 2022-05-15 NOTE — SUBJECTIVE & OBJECTIVE
Subjective:     Interval History: Patient seen and examined at bedside. RASHMI. Nausea mildly improved, no ostomy output yet.     Post-Op Info:  Procedure(s) (LRB):  COLECTOMY, SIGMOID (ERAS High, lithotomy) (N/A)  COLONOSCOPY (N/A)  CYSTOSCOPY,WITH URETERAL CATHETER INSERTION (Bilateral)  CHOLECYSTECTOMY (N/A)  APPENDECTOMY (N/A)  EXCISION, CECUM (N/A)  CREATION, COLOSTOMY   6 Days Post-Op      Medications:  Continuous Infusions:  Scheduled Meds:   acetaminophen  1,000 mg Oral Q8H    aspirin  81 mg Oral Daily    balsam peru-castor oiL   Topical (Top) BID    carvediloL  12.5 mg Oral BID WM    ceFEPime (MAXIPIME) IVPB  2 g Intravenous Q8H    ezetimibe  10 mg Oral QHS    fluconazole  400 mg Oral Q24H    linezolid  600 mg Oral Q12H    metroNIDAZOLE  500 mg Oral Q8H    pantoprazole  40 mg Oral Daily    polyethylene glycol  17 g Oral Daily    prasugreL  10 mg Oral Daily    scopolamine  1 patch Transdermal Q3 Days     PRN Meds:   albuterol-ipratropium    ALPRAZolam    aluminum-magnesium hydroxide-simethicone    dextrose 10%    dextrose 10%    glucagon (human recombinant)    glucose    glucose    HYDROmorphone    insulin aspart U-100    oxyCODONE    prochlorperazine    sodium chloride 0.9%    sodium chloride 0.9%        Objective:     Vital Signs (Most Recent):  Temp: 98.1 °F (36.7 °C) (05/15/22 0746)  Pulse: 85 (05/15/22 0746)  Resp: 16 (05/15/22 0746)  BP: (!) 106/55 (05/15/22 0746)  SpO2: 99 % (05/15/22 0746)   Vital Signs (24h Range):  Temp:  [97.3 °F (36.3 °C)-99.2 °F (37.3 °C)] 98.1 °F (36.7 °C)  Pulse:  [] 85  Resp:  [16-18] 16  SpO2:  [93 %-99 %] 99 %  BP: ()/(53-73) 106/55     Intake/Output - Last 3 Shifts         05/13 0700 05/14 0659 05/14 0700  05/15 0659 05/15 0700  05/16 0659    P.O.  360     I.V. (mL/kg)  49.4 (0.8)     IV Piggyback 4656.4 898.9     Total Intake(mL/kg) 4656.4 (78.4) 1308.3 (22)     Urine (mL/kg/hr) 2075 (1.5) 900 (0.6)     Emesis/NG output  0     Drains 0 5     Stool 0 0      Total Output 2075 905     Net +2581.4 +403.3            Urine Occurrence  0 x     Stool Occurrence 0 x 0 x     Emesis Occurrence  0 x             Physical Exam  Vitals and nursing note reviewed.   Constitutional:       Appearance: She is well-developed. She is not ill-appearing.   HENT:      Head: Normocephalic.   Eyes:      Pupils: Pupils are equal, round, and reactive to light.   Cardiovascular:      Rate and Rhythm: Normal rate and regular rhythm.      Heart sounds: Normal heart sounds.   Pulmonary:      Effort: Pulmonary effort is normal. No respiratory distress.      Breath sounds: Normal breath sounds. No wheezing or rales.   Abdominal:      Palpations: Abdomen is soft. There is no mass.      Tenderness: There is no guarding or rebound.      Comments: Incisions c/d/I, ostomy pink/viable with no output   Skin:     General: Skin is warm and dry.   Neurological:      Mental Status: She is alert and oriented to person, place, and time.   Psychiatric:         Behavior: Behavior normal.         Thought Content: Thought content normal.         Judgment: Judgment normal.           Significant Labs:  BMP (Last 3 Results):   Recent Labs   Lab 05/09/22  0416 05/09/22  1214 05/13/22  0443 05/14/22  0850 05/14/22  1529   GLU 83   < > 84 105 107      < > 132* 129* 129*   K 3.5   < > 3.0* 2.7* 3.9   CL 97   < > 91* 89* 89*   CO2 35*   < > 33* 31* 31*   BUN 12   < > 14 11 11   CREATININE 0.6   < > 0.6 0.6 0.6   CALCIUM 8.5*   < > 8.8 8.4* 8.2*   MG 1.7  --   --  1.5*  --     < > = values in this interval not displayed.       CBC (Last 3 Results):   Recent Labs   Lab 05/10/22  0508 05/12/22  1326 05/14/22  0850   WBC 3.34* 2.52* 3.76*   RBC 4.22 3.87* 4.00   HGB 11.6* 10.7* 11.0*   HCT 37.9 33.0* 34.2*    211 285   MCV 90 85 86   MCH 27.5 27.6 27.5   MCHC 30.6* 32.4 32.2         Significant Diagnostics:  None

## 2022-05-15 NOTE — CARE UPDATE
"RAPID RESPONSE NURSE CHART REVIEW        Chart Reviewed: 05/14/2022, 11:06 PM    MRN: [id number]  Bed: 1001/1001 A    Dx: Calculus of gallbladder with cholecystitis without biliary obstruction    Odette Hart has a past medical history of Acute coronary syndrome, Allergy, Arthritis, Cardiomyopathy, CHF (congestive heart failure), Coronary artery disease, Coronary artery disease of native artery of native heart with stable angina pectoris, Diverticulitis, Diverticulosis, Familial hypercholesterolemia, Former smoker, Heart attack, Heart disease, History of myocardial infarction, Hyperlipidemia, Hypertension, Hypertension, ICD (implantable cardioverter-defibrillator) in place, and Non-ST elevation myocardial infarction (NSTEMI).    Last VS: BP (!) 101/53 (BP Location: Left arm, Patient Position: Lying)   Pulse 87   Temp 98 °F (36.7 °C) (Oral)   Resp 16   Ht 5' 3" (1.6 m)   Wt 59.4 kg (131 lb)   LMP  (LMP Unknown)   SpO2 98%   Breastfeeding No   BMI 23.21 kg/m²     24H Vital Sign Range:  Temp:  [96.8 °F (36 °C)-98.2 °F (36.8 °C)]   Pulse:  [68-98]   Resp:  [16-18]   BP: (101-115)/(53-73)   SpO2:  [93 %-98 %]     Level of Consciousness (AVPU): alert    Recent Labs     05/12/22  1326 05/14/22  0850   WBC 2.52* 3.76*   HGB 10.7* 11.0*   HCT 33.0* 34.2*    285       Recent Labs     05/13/22  0443 05/14/22  0850 05/14/22  1529   * 129* 129*   K 3.0* 2.7* 3.9   CL 91* 89* 89*   CO2 33* 31* 31*   CREATININE 0.6 0.6 0.6   GLU 84 105 107   PHOS  --  1.7*  --    MG  --  1.5*  --         No results for input(s): PH, PCO2, PO2, HCO3, POCSATURATED, BE in the last 72 hours.     OXYGEN:  Flow (L/min): 2  Oxygen Concentration (%): 28  O2 Device (Oxygen Therapy): nasal cannula    MEWS score: 1    Bedside RNChauncey contacted. No concerns verbalized at this time. Instructed to call [phone] for further concerns or assistance.    Martha Carter RN      "

## 2022-05-15 NOTE — SUBJECTIVE & OBJECTIVE
"Interval History: patient complaining of "head not feeling right" likely secondary to worsening hypoNa. Suspect volume down. Giving NS today and trending BMPs. Also reports fluttering. Tele with some ectopy, repleating electrolytes. EKG pending     Review of Systems   Constitutional:  Negative for chills, fatigue and fever.   HENT:  Negative for congestion and rhinorrhea.    Eyes:  Negative for visual disturbance.   Respiratory:  Positive for shortness of breath. Negative for cough.    Cardiovascular:  Negative for chest pain, palpitations and leg swelling.   Gastrointestinal:  Positive for constipation and nausea. Negative for abdominal pain, diarrhea and vomiting.   Genitourinary:  Negative for dysuria and urgency.   Musculoskeletal:  Negative for back pain.   Skin:  Negative for rash.   Neurological:  Positive for light-headedness. Negative for dizziness, syncope, weakness and headaches.   Psychiatric/Behavioral:  Negative for agitation and confusion.    All other systems reviewed and are negative.  Objective:     Vital Signs (Most Recent):  Temp: 98.1 °F (36.7 °C) (05/15/22 1303)  Pulse: 70 (05/15/22 1303)  Resp: 20 (05/15/22 1303)  BP: (!) 82/51 (05/15/22 1303)  SpO2: 96 % (05/15/22 1303)   Vital Signs (24h Range):  Temp:  [97.3 °F (36.3 °C)-99.2 °F (37.3 °C)] 98.1 °F (36.7 °C)  Pulse:  [] 70  Resp:  [16-20] 20  SpO2:  [94 %-99 %] 96 %  BP: ()/(51-58) 82/51     Weight: 59.4 kg (131 lb)  Body mass index is 23.21 kg/m².    Intake/Output Summary (Last 24 hours) at 5/15/2022 1426  Last data filed at 5/15/2022 1255  Gross per 24 hour   Intake 1164.83 ml   Output 590 ml   Net 574.83 ml      Physical Exam  Constitutional:       Appearance: Normal appearance. She is well-developed.   HENT:      Head: Normocephalic and atraumatic.      Mouth/Throat:      Mouth: Mucous membranes are moist.   Eyes:      Conjunctiva/sclera: Conjunctivae normal.   Neck:      Comments: No JVD  Cardiovascular:      Rate and " Rhythm: Normal rate and regular rhythm.      Heart sounds: No murmur heard.  Pulmonary:      Effort: Pulmonary effort is normal. No respiratory distress.      Breath sounds: Normal breath sounds. No wheezing or rales.      Comments: On RA  Abdominal:      General: There is no distension.      Palpations: Abdomen is soft.      Tenderness: There is abdominal tenderness (improving daily).      Comments: Drain with serosanguinous ouput. Colostomy bag with minimal stool and gas, incision site c/d/i   Musculoskeletal:      Right lower leg: No edema.      Left lower leg: No edema.      Comments: PICC in RUE   Skin:     General: Skin is warm.   Neurological:      General: No focal deficit present.      Mental Status: She is alert and oriented to person, place, and time.      Comments: Appears more tired today, muttered speech this afternoon        Significant Labs: All pertinent labs within the past 24 hours have been reviewed.  CBC:   Recent Labs   Lab 05/14/22  0850 05/15/22  0636   WBC 3.76* 6.39   HGB 11.0* 11.3*   HCT 34.2* 36.4*    232     CMP:   Recent Labs   Lab 05/14/22  0850 05/14/22  1529 05/15/22  0815 05/15/22  1029 05/15/22  1236   *   < > 126* 127* 127*   K 2.7*   < > 3.5 3.2* 3.3*   CL 89*   < > 88* 89* 88*   CO2 31*   < > 29 29 29      < > 101 148* 151*   BUN 11   < > 14 14 15   CREATININE 0.6   < > 0.7 0.7 0.7   CALCIUM 8.4*   < > 8.3* 8.4* 8.5*   PROT 6.7  --  6.8  --   --    ALBUMIN 1.8*  --  1.8*  --   --    BILITOT 1.0  --  1.2*  --   --    ALKPHOS 80  --  93  --   --    AST 19  --  25  --   --    ALT 13  --  12  --   --    ANIONGAP 9   < > 9 9 10   EGFRNONAA >60.0   < > >60.0 >60.0 >60.0    < > = values in this interval not displayed.       Significant Imaging: I have reviewed all pertinent imaging results/findings within the past 24 hours.

## 2022-05-15 NOTE — PLAN OF CARE
POC reviewed w/ pt, verbalized understanding. Pt AAOx4. VS stable on 2L NC. On TELE, NSR, some PVC's noticed overnight. IS bedside. Denies chest pain & SOB. SCD's in place. Pt remains free of fall & injury. No acute events. Bed in lowest position, call light in reach, frequent rounds made for safety.     - m/l w DB O2A  - ROSARIO PICC line, 5/14 dressing on date  - Sacral spine altered integrity, covered w/ foam, barrier cream applied  - Cardona in place d/t retention  - Low fiber/residue diet, poor apetite  - ABX running per MAR  - Nausea managed w/ PRN meds  - Pain manged w PRN pain meds  - RLQ PRASAD drain, minimal output over night, striped 2x           Care transferred to oncoming nurse.      Problem: Adult Inpatient Plan of Care  Goal: Optimal Comfort and Wellbeing  Outcome: Ongoing, Progressing     Problem: Infection  Goal: Absence of Infection Signs and Symptoms  Outcome: Ongoing, Progressing     Problem: Fall Injury Risk  Goal: Absence of Fall and Fall-Related Injury  Outcome: Ongoing, Progressing     Problem: Impaired Wound Healing  Goal: Optimal Wound Healing  Outcome: Ongoing, Progressing

## 2022-05-16 PROBLEM — R53.81 PHYSICAL DECONDITIONING: Status: ACTIVE | Noted: 2022-01-01

## 2022-05-16 NOTE — PROGRESS NOTES
[facility] Medicine  Progress Note    Patient Name: Odette Hart  MRN: [id number]  Patient Class: IP- Inpatient   Admission Date: 5/6/2022  Length of Stay: 7 days  Attending Physician: Rafa Cardozo MD  Primary Care Provider: Braxton Barragan MD        Subjective:     Principal Problem:Calculus of gallbladder with cholecystitis without biliary obstruction        HPI:  Odette Hart is a 66 y.o. 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 Patient was at [facility] and has been complaining of constant LUQ abdominal pain, poor PO intake, and N/V. She has been having abdominal pain, nausea/vomiting, and inability to tolerate PO since she was diagnosed with diverticulitis. They thought her symptoms were due to flagyl and it was discontinued with improvement, however her symptoms returned 10 days ago. She had a CT of her abdomen on 05/04 which showed improving diverticulitis, persistent colovesicular fistula and distended gallbladder with pericholecystic fluid. She denies any fevers, chills, chest pain, dysuria.    General surgery consulted in ED and recommend HIDA.        Overview/Hospital Course:  Ms. Hart was admitted to Hospital Medicine for management of acute cholecystitis.  Gen Surg was consulted and HIDA was ordered.  CRS was consulted who plan for corrective surgery of her colovesical fistula with ostomy placement on 5/9 with combined cholecystectomy. Transferred to CRS service,  consulted for continued care in setting of ADHF. Started on diuresis with lasix 40mg IV BID, symptoms and oxygen requirements now much improved. Repeat echo unchanged with IVC of 3cm so lasix stopped. Now with decreased UOP and downtrending sodium. Will given gentle IVFs          Interval History: Feels SOB and dizzy at times,  particularly with exertion. Finishing abx and fluconazole today. Linezolid tomorrow. Na 127 > 129 > 127. Dry. Thirsty. Repeat NS today.     Review of Systems   Constitutional:  Negative for chills, fatigue and fever.   HENT:  Negative for congestion and rhinorrhea.    Eyes:  Negative for visual disturbance.   Respiratory:  Positive for shortness of breath. Negative for cough.    Cardiovascular:  Negative for chest pain, palpitations and leg swelling.   Gastrointestinal:  Positive for constipation and nausea. Negative for abdominal pain, diarrhea and vomiting.   Genitourinary:  Negative for dysuria and urgency.   Musculoskeletal:  Negative for back pain.   Skin:  Negative for rash.   Neurological:  Positive for light-headedness. Negative for dizziness, syncope, weakness and headaches.   Psychiatric/Behavioral:  Negative for agitation and confusion.    All other systems reviewed and are negative.  Objective:     Vital Signs (Most Recent):  Temp: 97.5 °F (36.4 °C) (05/16/22 0752)  Pulse: 81 (05/16/22 0752)  Resp: 17 (05/16/22 0752)  BP: (!) 107/57 (05/16/22 0752)  SpO2: (!) 94 % (05/16/22 0752)   Vital Signs (24h Range):  Temp:  [96.1 °F (35.6 °C)-98.1 °F (36.7 °C)] 97.5 °F (36.4 °C)  Pulse:  [62-86] 81  Resp:  [16-20] 17  SpO2:  [92 %-96 %] 94 %  BP: ()/(51-57) 107/57     Weight: 59.4 kg (131 lb)  Body mass index is 23.21 kg/m².    Intake/Output Summary (Last 24 hours) at 5/16/2022 1101  Last data filed at 5/16/2022 0400  Gross per 24 hour   Intake 1667.39 ml   Output 2005 ml   Net -337.61 ml        Physical Exam  Constitutional:       Appearance: Normal appearance. She is well-developed.   HENT:      Head: Normocephalic and atraumatic.      Mouth/Throat:      Mouth: Mucous membranes are moist.   Eyes:      Conjunctiva/sclera: Conjunctivae normal.   Neck:      Comments: No JVD  Cardiovascular:      Rate and Rhythm: Normal rate and regular rhythm.      Heart sounds: No murmur heard.  Pulmonary:      Effort:  Pulmonary effort is normal. No respiratory distress.      Breath sounds: Normal breath sounds. No wheezing or rales.      Comments: On RA  Abdominal:      General: There is no distension.      Palpations: Abdomen is soft.      Tenderness: There is abdominal tenderness (improving daily).      Comments: Drain with serosanguinous ouput. Colostomy bag with minimal stool and gas, incision site c/d/i   Musculoskeletal:      Right lower leg: No edema.      Left lower leg: No edema.      Comments: PICC in RUE   Skin:     General: Skin is warm.   Neurological:      General: No focal deficit present.      Mental Status: She is alert and oriented to person, place, and time.      Comments: Appears more tired today, muttered speech this afternoon        Significant Labs: All pertinent labs within the past 24 hours have been reviewed.  CBC:   Recent Labs   Lab 05/15/22  0636 05/16/22  0607   WBC 6.39 7.52   HGB 11.3* 10.8*   HCT 36.4* 33.2*    264       CMP:   Recent Labs   Lab 05/15/22  0815 05/15/22  1029 05/16/22  0017 05/16/22  0607 05/16/22  0820   *   < > 129* 127* 131*   K 3.5   < > 3.9 4.2 4.0   CL 88*   < > 92* 91* 95   CO2 29   < > 30* 28 28      < > 173* 147* 139*   BUN 14   < > 15 16 16   CREATININE 0.7   < > 0.6 0.6 0.6   CALCIUM 8.3*   < > 7.9* 7.7* 7.7*   PROT 6.8  --   --  6.0  --    ALBUMIN 1.8*  --   --  1.8*  --    BILITOT 1.2*  --   --  0.9  --    ALKPHOS 93  --   --  88  --    AST 25  --   --  19  --    ALT 12  --   --  12  --    ANIONGAP 9   < > 7* 8 8   EGFRNONAA >60.0   < > >60.0 >60.0 >60.0    < > = values in this interval not displayed.         Significant Imaging: I have reviewed all pertinent imaging results/findings within the past 24 hours.      Assessment/Plan:      * Calculus of gallbladder with cholecystitis without biliary obstruction  Acute diverticulitis  Colovesical fistula  · LUQ, N/V for past few months, worsened over the last few days   · US with cholelithiasis,  gallbladder wall thickening and pericholecystic fluid.  No sonographic Rasmussen's sign or wall hyperemia.    · HIDA ordered to determine if her cystic duct in patent  · S/p cholecystectomy with general surgery and ex-lap with ostomy creation with CRS on 5/9  · Finish today cefepime and flagyl x 7 days post op    Hyponatremia  - urine studies and exam consistent with hypovolemic hyponatremia  - giving NS and trending BMPs  - Na 127 > 129 > 127. Dry. Thirsty. Repeat NS today.       Prolonged Q-T interval on ECG  QTc 581 on admit  - repeat EKG pending. Switched antiemetics to compazine  - will transition to tramadol for pain meds as well pending repeat EKG   - rechecking electrolytes   - avoid other QT prolonging meds    Alteration in skin integrity  - sacral wound, wound care following       Mood disorder  - patient exhibiting signs of depression and anxiety   - psychology consult placed     Dizziness  - reports dizziness on exertion/standing. Possibly related to Bps or multifactorial given acute illness and acute decompensated systolic heart failure. Although now appears to be related to pain meds. Hgb stable   - may benefit from dose of meclizine if continues and QT improved      Acute decompensated heart failure  · Combined HF with EF 20%  · Radiographic evidence of pulmonary edema  · BNP ~ 3000  · Continue Coreg 12.5mg BID  · Holding further diuresis for now  · Low sodium diet with 1.5 L fluid restriction  · Strict I&Os, daily weights    - BNP remains elevated but repeat TTE with small IVC, volume overload resolved    Results for orders placed during the hospital encounter of 05/06/22    Echo    Interpretation Summary  · The left ventricle is severely enlarged with eccentric hypertrophy and severely decreased systolic function. The estimated ejection fraction is 20%.  · Mild right ventricular enlargement with normal right ventricular systolic function.  · Grade II left ventricular diastolic dysfunction.  · Severe  left atrial enlargement.  · Mild-to-moderate mitral regurgitation.  · Mild tricuspid regurgitation.  · The estimated PA systolic pressure is 35 mmHg.  · Normal central venous pressure (3 mmHg).        Anemia  · Stable.   · Blood smear notable for Rouleaux formation      Acute cholecystitis  · As above      Urinary tract infection with pyuria  · In April - Pseudomonas / Enterobacteroid acute cystitis without hematuria  · Urine cx positive for MDR VRE. ID following, continue  linezolid x 7 days (end date 5/17)  · Finish fluconazole for yeast today    Colovesical fistula  · Continue Cefepime and Flagyl  S/p cholecystectomy with general surgery and ex-lap with ostomy creation with CRS on     Primary hypertension  · Chronic and stable  · Continue Coreg 12.5mg BID    Familial hypercholesterolemia  Continue Zetia  Might need Niacin    Coronary artery disease of native artery of native heart with stable angina pectoris  · Chronic and stable  · Continue Aspirin 81mg PO daily  · Continue Coreg 12.5mg PO BID  · Prasugrel restarted 5/14    Acute diverticulitis  · Chronic issue  · Has been on TPN  · CRS following    Automatic implantable cardioverter-defibrillator in situ  · Chronic and stable  · No acute issues      VTE Risk Mitigation (From admission, onward)         Ordered     IP VTE HIGH RISK PATIENT  Once         05/06/22 8155     Place sequential compression device  Until discontinued         05/06/22 5817                    Analy Garrison DO  Department of Hospital Medicine   [facility]

## 2022-05-16 NOTE — ASSESSMENT & PLAN NOTE
- reports dizziness on exertion/standing. Possibly related to Bps or multifactorial given acute illness and acute decompensated systolic heart failure. Although now appears to be related to pain meds. Hgb stable   - may benefit from dose of meclizine if continues and QT improved

## 2022-05-16 NOTE — SUBJECTIVE & OBJECTIVE
Interval History: Feels SOB and dizzy at times, particularly with exertion. Finishing abx and fluconazole today. Linezolid tomorrow. Na 127 > 129 > 127. Dry. Thirsty. Repeat NS today.     Review of Systems   Constitutional:  Negative for chills, fatigue and fever.   HENT:  Negative for congestion and rhinorrhea.    Eyes:  Negative for visual disturbance.   Respiratory:  Positive for shortness of breath. Negative for cough.    Cardiovascular:  Negative for chest pain, palpitations and leg swelling.   Gastrointestinal:  Positive for constipation and nausea. Negative for abdominal pain, diarrhea and vomiting.   Genitourinary:  Negative for dysuria and urgency.   Musculoskeletal:  Negative for back pain.   Skin:  Negative for rash.   Neurological:  Positive for light-headedness. Negative for dizziness, syncope, weakness and headaches.   Psychiatric/Behavioral:  Negative for agitation and confusion.    All other systems reviewed and are negative.  Objective:     Vital Signs (Most Recent):  Temp: 97.5 °F (36.4 °C) (05/16/22 0752)  Pulse: 81 (05/16/22 0752)  Resp: 17 (05/16/22 0752)  BP: (!) 107/57 (05/16/22 0752)  SpO2: (!) 94 % (05/16/22 0752)   Vital Signs (24h Range):  Temp:  [96.1 °F (35.6 °C)-98.1 °F (36.7 °C)] 97.5 °F (36.4 °C)  Pulse:  [62-86] 81  Resp:  [16-20] 17  SpO2:  [92 %-96 %] 94 %  BP: ()/(51-57) 107/57     Weight: 59.4 kg (131 lb)  Body mass index is 23.21 kg/m².    Intake/Output Summary (Last 24 hours) at 5/16/2022 1101  Last data filed at 5/16/2022 0400  Gross per 24 hour   Intake 1667.39 ml   Output 2005 ml   Net -337.61 ml        Physical Exam  Constitutional:       Appearance: Normal appearance. She is well-developed.   HENT:      Head: Normocephalic and atraumatic.      Mouth/Throat:      Mouth: Mucous membranes are moist.   Eyes:      Conjunctiva/sclera: Conjunctivae normal.   Neck:      Comments: No JVD  Cardiovascular:      Rate and Rhythm: Normal rate and regular rhythm.      Heart sounds:  No murmur heard.  Pulmonary:      Effort: Pulmonary effort is normal. No respiratory distress.      Breath sounds: Normal breath sounds. No wheezing or rales.      Comments: On RA  Abdominal:      General: There is no distension.      Palpations: Abdomen is soft.      Tenderness: There is abdominal tenderness (improving daily).      Comments: Drain with serosanguinous ouput. Colostomy bag with minimal stool and gas, incision site c/d/i   Musculoskeletal:      Right lower leg: No edema.      Left lower leg: No edema.      Comments: PICC in RUE   Skin:     General: Skin is warm.   Neurological:      General: No focal deficit present.      Mental Status: She is alert and oriented to person, place, and time.      Comments: Appears more tired today, muttered speech this afternoon        Significant Labs: All pertinent labs within the past 24 hours have been reviewed.  CBC:   Recent Labs   Lab 05/15/22  0636 05/16/22  0607   WBC 6.39 7.52   HGB 11.3* 10.8*   HCT 36.4* 33.2*    264       CMP:   Recent Labs   Lab 05/15/22  0815 05/15/22  1029 05/16/22  0017 05/16/22  0607 05/16/22  0820   *   < > 129* 127* 131*   K 3.5   < > 3.9 4.2 4.0   CL 88*   < > 92* 91* 95   CO2 29   < > 30* 28 28      < > 173* 147* 139*   BUN 14   < > 15 16 16   CREATININE 0.7   < > 0.6 0.6 0.6   CALCIUM 8.3*   < > 7.9* 7.7* 7.7*   PROT 6.8  --   --  6.0  --    ALBUMIN 1.8*  --   --  1.8*  --    BILITOT 1.2*  --   --  0.9  --    ALKPHOS 93  --   --  88  --    AST 25  --   --  19  --    ALT 12  --   --  12  --    ANIONGAP 9   < > 7* 8 8   EGFRNONAA >60.0   < > >60.0 >60.0 >60.0    < > = values in this interval not displayed.         Significant Imaging: I have reviewed all pertinent imaging results/findings within the past 24 hours.

## 2022-05-16 NOTE — ASSESSMENT & PLAN NOTE
- urine studies and exam consistent with hypovolemic hyponatremia  - giving NS and trending BMPs  - Na 127 > 129 > 127. Dry. Thirsty. Repeat NS today.

## 2022-05-16 NOTE — PLAN OF CARE
Recommendations     1. Continue current Low fiber diet + ONS. Encourage adequate PO intake as tolerated.   2. RD to monitor & follow-up.     Goals: Pt to meet >75% EEN/EPN by RD follow up  Nutrition Goal Status: goal not met  Communication of RD Recs:  (POC)

## 2022-05-16 NOTE — NURSING
Returned to patients room to continue with ostomy lessons and teaching. Received patient awake sitting in chair at the bedside talking on phone. This writer asked patient did she feel like changing her ostomy pouch this afternoon. Patient voiced that she felt like she could. Pouch removal demonstrated and patient actively cleaned ostomy herself with continued encouragement. Demonstrated ostomy measuring and applying new pouch.Output 250 ml of dark green liquid stool. Patient replied once ostomy lesson and demonstration was completed that she has a bad memory. Will continue with follow up lessons.

## 2022-05-16 NOTE — PLAN OF CARE
Patient up in the chair most of the day, IVF infusing. PO antibiotics, Patient alert and oriented, 2LO2 NC. No family at bedside today.     Problem: Adult Inpatient Plan of Care  Goal: Plan of Care Review  Flowsheets (Taken 5/16/2022 1814)  Plan of Care Reviewed With: patient

## 2022-05-16 NOTE — PROGRESS NOTES
[facility]  Adult Nutrition  Consult Note    SUMMARY     Recommendations    1. Continue current Low fiber diet + ONS. Encourage adequate PO intake as tolerated.   2. RD to monitor & follow-up.    Goals: Pt to meet >75% EEN/EPN by RD follow up  Nutrition Goal Status: goal not met  Communication of RD Recs:  (POC)    Assessment and Plan    Severe malnutrition    Nutrition Problem:  Moderate/Severe Protein-Calorie Malnutrition  Malnutrition in the context of Chronic Illness/Injury     Related to (etiology):  Inadequate energy intake, altered GI function     Signs and Symptoms (as evidenced by):  Energy Intake: <75% of estimated energy requirement for >/=1 month   Body Fat Depletion: mild depletion of orbitals and triceps   Muscle Mass Depletion: mild and moderate depletion of clavicle region, interosseous muscle and lower extremities      Interventions(treatment strategy):  Collaboration of nutrition care with other providers     Nutrition Diagnosis Status:  Continues     Malnutrition Assessment    Malnutrition Type: chronic illness  Energy Intake: moderate energy intake    Energy Intake (Malnutrition): less than 75% for greater than or equal to 1 month   Orbital Region (Subcutaneous Fat Loss): mild depletion  Upper Arm Region (Subcutaneous Fat Loss): mild depletion   Clavicle Bone Region (Muscle Loss): mild depletion  Dorsal Hand (Muscle Loss): moderate depletion  Anterior Thigh Region (Muscle Loss): mild depletion  Posterior Calf Region (Muscle Loss): moderate depletion     Reason for Assessment    Reason For Assessment: RD follow-up  Diagnosis: other (see comments) (calculus of gallbaldder with cholecystitis without bilary obstruction)  Relevant Medical History: CAD, CHF, ICD, 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 UBW is 130-145 per pt. NFPE  "completed  with mild to moderate wasting present. Pt diagnosed with malnutrition at last admission. Pt continues with malnutrition, but status improving with wt gain. RD to continue to monitor and f/u. S/p colectomy, colonoscopy, cystoscopy, appendectomy, colostomy creation, cholecystectomy.  Nutrition Discharge Planning: pending clinical course    Nutrition/Diet History    Spiritual, Cultural Beliefs, Alevism Practices, Values that Affect Care: no  Food Allergies: NKFA  Factors Affecting Nutritional Intake: decreased appetite    Anthropometrics    Temp: 97.5 °F (36.4 °C)  Height: 5' 3" (160 cm)  Height (inches): 63 in  Weight Method: Bed Scale  Weight: 59.4 kg (130 lb 15.3 oz)  Weight (lb): 130.95 lb  Ideal Body Weight (IBW), Female: 115 lb  % Ideal Body Weight, Female (lb): 113.87 %  BMI (Calculated): 23.2  BMI Grade: 18.5-24.9 - normal  Usual Body Weight (UBW), k kg  % Usual Body Weight: 94.48  % Weight Change From Usual Weight: -5.72 %    Lab/Procedures/Meds    Pertinent Labs Reviewed: reviewed  Pertinent Labs Comments: Na 127  Pertinent Medications Reviewed: reviewed  Pertinent Medications Comments: -    Estimated/Assessed Needs    Weight Used For Calorie Calculations: 59.4 kg (130 lb 15.3 oz)     Energy Calorie Requirements (kcal): 1495 kcal  Energy Need Method: Kcal/kg (25 kcal/kg)     Protein Requirements: 60-72g (1-1.2g/kg)   Weight Used For Protein Calculations: 59.4 kg (130 lb 15.3 oz)     Fluid Requirements (mL): 1ml/kcal or per MD  RDA Method (mL): 1495    Nutrition Prescription Ordered    Current Diet Order: Low fiber  Nutrition Order Comments: Boost Plus ONS    Evaluation of Received Nutrient/Fluid Intake    I/O: -2.3L since admit    Comments: LBM: 5/15    Tolerance: tolerating    Nutrition Risk    Level of Risk/Frequency of Follow-up:  (1x/week)     Monitor and Evaluation    Food and Nutrient Intake: energy intake, food and beverage intake, parenteral nutrition intake  Food and Nutrient " Adminstration: diet order, enteral and parenteral nutrition administration  Knowledge/Beliefs/Attitudes: food and nutrition knowledge/skill, beliefs and attitudes  Physical Activity and Function: nutrition-related ADLs and IADLs  Anthropometric Measurements: weight, weight change, body mass index  Biochemical Data, Medical Tests and Procedures: electrolyte and renal panel, lipid profile, gastrointestinal profile, glucose/endocrine profile, inflammatory profile  Nutrition-Focused Physical Findings: overall appearance, extremities, muscles and bones     Nutrition Follow-Up    RD Follow-up?: Yes

## 2022-05-16 NOTE — SUBJECTIVE & OBJECTIVE
Subjective:     Interval History: no acute events overnite, still not eating, refusing to partijcipate in ostomy care, c/o nausea, no appetitie    Post-Op Info:  Procedure(s) (LRB):  COLECTOMY, SIGMOID (ERAS High, lithotomy) (N/A)  COLONOSCOPY (N/A)  CYSTOSCOPY,WITH URETERAL CATHETER INSERTION (Bilateral)  CHOLECYSTECTOMY (N/A)  APPENDECTOMY (N/A)  EXCISION, CECUM (N/A)  CREATION, COLOSTOMY   7 Days Post-Op      Medications:  Continuous Infusions:   sodium chloride 0.9% 100 mL/hr at 05/16/22 1048     Scheduled Meds:   acetaminophen  1,000 mg Oral Q8H    aspirin  81 mg Oral Daily    balsam peru-castor oiL   Topical (Top) BID    carvediloL  12.5 mg Oral BID WM    ceFEPime (MAXIPIME) IVPB  2 g Intravenous Q8H    ezetimibe  10 mg Oral QHS    fluconazole  400 mg Oral Q24H    linezolid  600 mg Oral Q12H    metroNIDAZOLE  500 mg Oral Q8H    pantoprazole  40 mg Oral Daily    polyethylene glycol  17 g Oral Daily    potassium, sodium phosphates  1 packet Oral QID (AC & HS)    prasugreL  10 mg Oral Daily    scopolamine  1 patch Transdermal Q3 Days     PRN Meds:   albuterol-ipratropium    ALPRAZolam    aluminum-magnesium hydroxide-simethicone    dextrose 10%    dextrose 10%    glucagon (human recombinant)    glucose    glucose    HYDROmorphone    insulin aspart U-100    oxyCODONE    prochlorperazine    sodium chloride 0.9%    sodium chloride 0.9%        Objective:     Vital Signs (Most Recent):  Temp: 97.5 °F (36.4 °C) (05/16/22 0752)  Pulse: 81 (05/16/22 0752)  Resp: 17 (05/16/22 0752)  BP: (!) 107/57 (05/16/22 0752)  SpO2: (!) 94 % (05/16/22 0752)   Vital Signs (24h Range):  Temp:  [96.1 °F (35.6 °C)-98.1 °F (36.7 °C)] 97.5 °F (36.4 °C)  Pulse:  [62-86] 81  Resp:  [16-20] 17  SpO2:  [92 %-96 %] 94 %  BP: ()/(51-57) 107/57     Intake/Output - Last 3 Shifts         05/14 0700  05/15 0659 05/15 0700  05/16 0659 05/16 0700  05/17 0659    P.O. 360 480     I.V. (mL/kg) 49.4 (0.8) 781.2 (13.2)     IV Piggyback 898.9 646.2      Total Intake(mL/kg) 1308.3 (22) 1907.4 (32.1)     Urine (mL/kg/hr) 350 (0.2) 1700 (1.2)     Emesis/NG output 0      Drains 5 15     Stool 0 525     Total Output 355 2240     Net +953.3 -332.6            Urine Occurrence 0 x      Stool Occurrence 0 x 0 x     Emesis Occurrence 0 x              Physical Exam  Constitutional:       Appearance: She is well-developed. She is ill-appearing.   HENT:      Head: Normocephalic.   Eyes:      Pupils: Pupils are equal, round, and reactive to light.   Cardiovascular:      Rate and Rhythm: Normal rate and regular rhythm.      Heart sounds: Normal heart sounds.   Pulmonary:      Effort: Pulmonary effort is normal. No respiratory distress.      Breath sounds: Normal breath sounds. No wheezing or rales.   Abdominal:      Palpations: Abdomen is soft. There is no mass.      Tenderness: There is no guarding or rebound.      Comments: Abd inc line healing well  PRASAD serous drainage  Cardona elissa uirne   Skin:     General: Skin is warm and dry.      Comments: Discoloration of sacrum, no skin breakdown ntoed   Neurological:      Mental Status: She is alert and oriented to person, place, and time.   Psychiatric:         Behavior: Behavior normal.         Thought Content: Thought content normal.         Judgment: Judgment normal.           Significant Labs:  BMP (Last 3 Results):   Recent Labs   Lab 05/14/22  0850 05/14/22  1529 05/15/22  0815 05/15/22  1029 05/16/22  0017 05/16/22  0607 05/16/22  0820      < > 101   < > 173* 147* 139*   *   < > 126*   < > 129* 127* 131*   K 2.7*   < > 3.5   < > 3.9 4.2 4.0   CL 89*   < > 88*   < > 92* 91* 95   CO2 31*   < > 29   < > 30* 28 28   BUN 11   < > 14   < > 15 16 16   CREATININE 0.6   < > 0.7   < > 0.6 0.6 0.6   CALCIUM 8.4*   < > 8.3*   < > 7.9* 7.7* 7.7*   MG 1.5*  --  1.7  --   --  1.9  --     < > = values in this interval not displayed.     CBC (Last 3 Results):   Recent Labs   Lab 05/14/22  0850 05/15/22  0636 05/16/22  0607   WBC  3.76* 6.39 7.52   RBC 4.00 4.08 3.93*   HGB 11.0* 11.3* 10.8*   HCT 34.2* 36.4* 33.2*    232 264   MCV 86 89 85   MCH 27.5 27.7 27.5   MCHC 32.2 31.0* 32.5       Significant Diagnostics:  None

## 2022-05-16 NOTE — ASSESSMENT & PLAN NOTE
Cont PT/OT  Strongly enc pt to participate in care, becomes quite irate when providers suggest she attempt to walke and care for her ostomy

## 2022-05-16 NOTE — ASSESSMENT & PLAN NOTE
Acute diverticulitis  Colovesical fistula  · LUQ, N/V for past few months, worsened over the last few days   · US with cholelithiasis, gallbladder wall thickening and pericholecystic fluid.  No sonographic Rasmussen's sign or wall hyperemia.    · HIDA ordered to determine if her cystic duct in patent  · S/p cholecystectomy with general surgery and ex-lap with ostomy creation with CRS on 5/9  · Finish today cefepime and flagyl x 7 days post op

## 2022-05-16 NOTE — ASSESSMENT & PLAN NOTE
· In April - Pseudomonas / Enterobacteroid acute cystitis without hematuria  · Urine cx positive for MDR VRE. ID following, continue  linezolid x 7 days (end date 5/17)  · Finish fluconazole for yeast today

## 2022-05-16 NOTE — ASSESSMENT & PLAN NOTE
Consult to ID for hx of UTI with VRE, continuing antibiotics until seen by ID  Appreciate ID input, continue antibiotics 5-7 days post op, to be finished today 5/16

## 2022-05-16 NOTE — PT/OT/SLP PROGRESS
Physical Therapy Co-Treatment with OT    Patient Name:  Odette Hart   MRN:  [id number]    Recent Surgery: Procedure(s) (LRB):  COLECTOMY, SIGMOID (ERAS High, lithotomy) (N/A)  COLONOSCOPY (N/A)  CYSTOSCOPY,WITH URETERAL CATHETER INSERTION (Bilateral)  CHOLECYSTECTOMY (N/A)  APPENDECTOMY (N/A)  EXCISION, CECUM (N/A)  CREATION, COLOSTOMY 7 Days Post-Op    *Co-treatment with OT due to suspected patient complexity and need for two skilled therapists to ensure safe mobilization.    Recommendations:     Discharge Recommendations:  nursing facility, skilled   Discharge Equipment Recommendations: walker, rolling, bedside commode   Barriers to discharge: Increased level of assist and Inaccessible home    Highest Level of Mobility: Gait 21'  Assistance Required: Min(A) w/ RW    Assessment:     Odette Hart is a 66 y.o. female admitted with a medical diagnosis of Calculus of gallbladder with cholecystitis without biliary obstruction.  She presents with the following impairments/functional limitations:  weakness, impaired endurance, impaired self care skills, gait instability, impaired functional mobilty, decreased lower extremity function, decreased safety awareness, impaired balance, impaired cardiopulmonary response to activity, pain. Odette Hart would benefit from acute PT intervention to address listed functional deficits, provide patient/caregiver education, reduce fall risk, and maximize (I) and safety with functional mobility.    Pt met with HOB elevated and agreeable to PT treatment. Today's PT treatment focus was on gait training to improve pt's activity tolerance and address gait deviations. Pt fatigues very rapidly and requires min(A) for gait training with RW. She was able to ambulate 21' today but required 3 standing rest breaks. Pt is a high fall risk at this time and is unsafe to return home.    Pt is progressing towards acute PT goals appropriately and continues to benefit from acute PT  "sessions. After hospital discharge, pt would benefit from SNF to maximize rehab potential.    Rehab Prognosis: Good; patient would benefit from acute skilled PT services to address these deficits and reach maximum level of function.      Plan:     During this hospitalization, patient to be seen 3 x/week to address the identified rehab impairments via therapeutic activities, therapeutic exercises, gait training and progress toward the following goals:    · Plan of Care Expires:  06/10/22    This plan of care has been discussed with the patient/caregiver, who was included in its development and is in agreement with the identified goals and treatment plan.     Subjective     Communicated with RN prior to session.  Patient agreeable to participate.     Pain/Comfort:  · Pain Rating 1: 5/10  · Location - Orientation 1: generalized  · Location 1: abdomen  · Pain Addressed 1: Reposition, Distraction  · Pain Rating Post-Intervention 1: 5/10    Chief Complaint: Dizziness  Patient/Family Comments/goals: "I get so woozy anytime I move around"      Objective:     Patient found HOB elevated with telemetry, PRASAD drain, quinones catheter (ostomy)  upon PT entry to room.    General Precautions: Standard, fall   Orthopedic Precautions:N/A   Braces: N/A         Exams:     Cognition:  o Patient is oriented to Person, Place, Time, Situation, follows commands 100% of the time    Functional Mobility:    Bed Mobility:  · Supine to Sit: Moderate Assistance on R side of bed  · Rolling R: Minimal Assistance  · Scooting anteriorly to EOB to plant feet on floor: Minimal Assistance     Transfers:   · Sit to Stand Transfer: Minimal Assistance  from EOB with RW AD   · Increased time  · PT provided verbal cues for proper hand placement on AD  · Bed to Chair: Minimal Assistance with RW AD via stand step t/f             Gait:  · Patient received gait training in hallway 21 feet with Minimal Assistance and rolling walker  · Gait Assessment: unsteady gait, " decreased step length, narrow base of support, decreased weight shift, ambulates outside TACHO of RW, flexed posture and decreased jan  · Gait Pattern Observed: Step-to gait pattern  · Comments: All lines remained intact throughout ambulation trial, chair follow for patient safety. Pt required 3 standing rest breaks 2/2 fatigue. PT provided verbal cues for proper AD management.    Balance:  · Static Sit:   · Stand-By Assist at EOB   · Normal: Patient able to maintain steady balance without handhold support.  · Static Stand:   · Contact-Guard Assist with Rolling walker  · Good: Patient able to maintain balance without handhold support, limited postural sway  · Dynamic Stand:  · Min Assist with Rolling walker    Therapeutic Activities/Exercises      Patient assisted with functional mobility as noted above   PT educated pt on performing APs when she begins to feel lightheaded in sitting to improve circulation   Patient educated on the importance of early mobility to prevent functional decline during hospital stay   Patient was instructed to utilize staff assistance for mobility/transfers.  o Patient is appropriate to transfer with min(A) and RN/PCT assist   Patient educated on PT POC and role of PT in acute care   White board updated regarding patient's safest level of mobility with staff assistance, RN also updated.     AM-PAC 6 CLICK MOBILITY  Turning over in bed (including adjusting bedclothes, sheets and blankets)?: 3  Sitting down on and standing up from a chair with arms (e.g., wheelchair, bedside commode, etc.): 3  Moving from lying on back to sitting on the side of the bed?: 3  Moving to and from a bed to a chair (including a wheelchair)?: 3  Need to walk in hospital room?: 3  Climbing 3-5 steps with a railing?: 2  Basic Mobility Total Score: 17     Patient left up in chair with all lines intact, call button in reach and RN notified.        History/Goals:     PAST MEDICAL HISTORY:  Past Medical History:    Diagnosis Date    Acute coronary syndrome     Allergy     Arthritis     Cardiomyopathy     CHF (congestive heart failure)     Coronary artery disease     Coronary artery disease of native artery of native heart with stable angina pectoris 2021: [facility]: Cath: LAD: Proximal stent patent. LCX: Proximal 80%. LCX: Dominant. Moderate disease. LCX: HEVER 2.75 x 18 mm. Distal dissection. HEVER 2.75 x 22 mm.     Diverticulitis     Diverticulosis     Familial hypercholesterolemia 2022    Former smoker 2022    Heart attack     Heart disease     History of myocardial infarction 2022    Hyperlipidemia     Hypertension     Hypertension 2022    ICD (implantable cardioverter-defibrillator) in place     Non-ST elevation myocardial infarction (NSTEMI) 2019       Past Surgical History:   Procedure Laterality Date    APPENDECTOMY N/A 2022    Procedure: APPENDECTOMY;  Surgeon: Rafa Cardozo MD;  [facility];  Service: Colon and Rectal;  Laterality: N/A;    ATRIAL CARDIAC PACEMAKER INSERTION      CECECTOMY N/A 2022    Procedure: EXCISION, CECUM;  Surgeon: Rafa Cardozo MD;  [facility];  Service: Colon and Rectal;  Laterality: N/A;     SECTION      CHOLECYSTECTOMY N/A 2022    Procedure: CHOLECYSTECTOMY;  Surgeon: Doug Epstein MD;  [facility];  Service: General;  Laterality: N/A;    COLONOSCOPY N/A 2022    Procedure: COLONOSCOPY;  Surgeon: Rafa Cardozo MD;  [facility];  Service: Colon and Rectal;  Laterality: N/A;    COLOSTOMY  2022    Procedure: CREATION, COLOSTOMY;  Surgeon: Rafa Cardozo MD;  [facility];  Service: Colon and Rectal;;    CORONARY STENT PLACEMENT      LEFT HEART CATHETERIZATION Left 2022    Procedure: CATHETERIZATION, HEART, LEFT;  Surgeon: Yohannes Cordova MD;  Location: [facility] CATH LAB;  Service: Cardiology;  Laterality:  Left;       GOALS:   Multidisciplinary Problems     Physical Therapy Goals        Problem: Physical Therapy    Goal Priority Disciplines Outcome Goal Variances Interventions   Physical Therapy Goal     PT, PT/OT Ongoing, Progressing     Description: Goals to be met by: 22     Patient will increase functional independence with mobility by performin. Supine to sit with Modified [place]  2. Sit to supine with Modified [place]  3. Sit to stand transfer with Supervision  4. Bed to chair transfer with Supervision using LRAD  5. Gait  x 150 feet with Supervision using LRAD.   6. Ascend/descend 4 stair with bilateral Handrails and Supervision using LRAD.   7. Lower extremity exercise program x15 reps per handout, with supervision                     Time Tracking:     PT Received On: 22  PT Start Time: 906     PT Stop Time: 933  PT Total Time (min): 27 min     Billable Minutes: Gait Training 15 and Therapeutic Activity 12      Marion Arriaga, PT  2022  Pager# [phone]

## 2022-05-16 NOTE — NURSING
Met with patient at the bedside to begin ostomy lesson #3 with return demonstration from patient of removing and putting on new ostomy pouch. Patient requested for this writer to come back later when she is more awake and alert. This writer acknowledged patients request, will return later to see if patient is more awake and willing to participate in her ostomy lessons.

## 2022-05-16 NOTE — PT/OT/SLP PROGRESS
Occupational Therapy   Co-Treatment  Co-treatment with PT for maximal pt participation, safety, and activity tolerance     Name: Odette Hatr  MRN: [id number]  Admitting Diagnosis:  Calculus of gallbladder with cholecystitis without biliary obstruction  7 Days Post-Op    Recommendations:     Discharge Recommendations: nursing facility, skilled  Discharge Equipment Recommendations:  walker, rolling, bedside commode  Barriers to discharge:  Decreased caregiver support    Assessment:     Odette Hart is a 66 y.o. female with a medical diagnosis of Calculus of gallbladder with cholecystitis without biliary obstruction.  She presents with impaired ADL and mobility performance defiicts. Pt found upright in bed and agreeable for therapy with encouragement. Pt continues to report pain in abdomen, dizziness, and LI requiring increased time and cues for safety to complete. Pt required min-mod A for bed mobility today and completed mobility to her doorway today using RW. Pt required 3 standing breaks during gait with LI reported and chair ultimately brought to pt once at doorway 2/2 debility and fatigue noted. Pt on room air throughout entirety of this session. Pt was left upright with grooming care tasks reporting appreciation.     At this time, pt is a high fall risk and is not safe to return home. Pt would benefit from continued OT skilled services 3x/wk to improve daily living skills to optimize QOL. Pt is recommended to discharge to SNF at this time. Performance deficits affecting function are weakness, impaired functional mobilty, impaired endurance, decreased safety awareness, gait instability, impaired balance, impaired self care skills, decreased lower extremity function, decreased upper extremity function, pain, impaired cardiopulmonary response to activity.     Rehab Prognosis:  Good; patient would benefit from acute skilled OT services to address these deficits and reach maximum level of function.        Plan:     Patient to be seen 3 x/week to address the above listed problems via self-care/home management, therapeutic activities, therapeutic exercises  · Plan of Care Expires: 06/09/22  · Plan of Care Reviewed with: patient    Subjective     Pain/Comfort:  · Pain Rating 1: 5/10  · Location - Orientation 1: generalized  · Location 1: abdomen  · Pain Addressed 1: Reposition, Distraction    Objective:     Communicated with: RN prior to session.  Patient found HOB elevated with telemetry, PRASAD drain, quinones catheter, Other (comments) (ostomy) upon OT entry to room.    General Precautions: Standard, fall   Orthopedic Precautions:N/A   Braces: N/A  Respiratory Status: Room air     Occupational Performance:     Bed Mobility:    · Patient completed Rolling/Turning to Right with minimum assistance  · Patient completed Scooting/Bridging with moderate assistance  · Patient completed Supine to Sit with moderate assistance     Functional Mobility/Transfers:  · Patient completed Sit <> Stand Transfer with minimum assistance  with  rolling walker   · Patient completed Bed <> Chair Transfer using Step Transfer technique with minimum assistance with rolling walker  · Functional Mobility: Pt sat EOB ~4 minutes for hair styling with SBA. Pt stood from bed with RW and mobilized to doorway only this date. Pt required increased time for completion using RW and needed 3 standing breaks 2/2 LI. Pt did also require increased time at start of stand to statically maintain 2/2 dizziness. Pt was left in chair with oral care items setup for pt.     Activities of Daily Living:  · Grooming: SBA for balance  with this therapist styling hair for pt at EOB; oral care items set upright for pt in chair.   · Upper Body Dressing: minimum assistance donning gown at EOB seated         [facility] 6 Click ADL: 15    Treatment & Education:  Pt educated on role of occupational therapy, POC, and safety during ADLs and functional mobility. Pt and OT discussed  importance of safe, continued mobility to optimize daily living skills. Pt verbalized understanding.   White board updated during session. Pt given instruction to call for medical staff/nurse for assistance.       Patient left up in chair with all lines intact, call button in reach and RN notifiedEducation:      GOALS:   Multidisciplinary Problems     Occupational Therapy Goals        Problem: Occupational Therapy    Goal Priority Disciplines Outcome Interventions   Occupational Therapy Goal     OT, PT/OT Ongoing, Progressing    Description: Goals to be met by: 5/24/22     Patient will increase functional independence with ADLs by performing:    Feeding with [place].  UE Dressing with [place].  LE Dressing with Stand-by Assistance.  Grooming while standing at sink with Supervision.  Toileting from toilet with Supervision for hygiene and clothing management.   Toilet transfer to toilet with Supervision.  Pt will engage in functional mobility to simulate household distances in order to maximize functional activity tolerance required for engagement in occupations of choice with supervision using LRAD.                    Time Tracking:     OT Date of Treatment: 05/16/22  OT Start Time: 0903  OT Stop Time: 0933  OT Total Time (min): 30 min    Billable Minutes:Self Care/Home Management 15 min  Therapeutic Activity 15 min    OT/FROILAN: OT     FROILAN Visit Number: 0    5/16/2022

## 2022-05-16 NOTE — PLAN OF CARE
POC reviewed with patient who verbalized understanding. VSS on 1L NC.  Remains free of falls and injury. Patient up to chair, refused to ambulate due to weakness. Frequent weight shift encouraged.      - AAOX4 with periods of confusion.  - ML w/DB  - sacral wound, care per order  - RLQ PRASAD  - Colostomy w/ flatus or stool, 1 x bag change  - R PICC  - quinones to gravity      Poor tolerance of diet, no appetite.  Nausea moderately controlled with PRN medications. Pain controlled with PRN medications per MAR. Telemetry being monitored running NSR. Patient denies chest pain & SOB. No acute events. No distress noted. Bed in lowest position, call light within reach, frequent rounds made for safety.      WCTM

## 2022-05-16 NOTE — PROGRESS NOTES
[facility]  Colorectal Surgery  Progress Note    Patient Name: Odette Hart  MRN: [id number]  Admission Date: 5/6/2022  Hospital Length of Stay: 7 days  Attending Physician: Rafa Cardozo MD    Subjective:     Interval History: no acute events overnite, still not eating, refusing to partijcipate in ostomy care, c/o nausea, no appetitie    Post-Op Info:  Procedure(s) (LRB):  COLECTOMY, SIGMOID (ERAS High, lithotomy) (N/A)  COLONOSCOPY (N/A)  CYSTOSCOPY,WITH URETERAL CATHETER INSERTION (Bilateral)  CHOLECYSTECTOMY (N/A)  APPENDECTOMY (N/A)  EXCISION, CECUM (N/A)  CREATION, COLOSTOMY   7 Days Post-Op      Medications:  Continuous Infusions:   sodium chloride 0.9% 100 mL/hr at 05/16/22 1048     Scheduled Meds:   acetaminophen  1,000 mg Oral Q8H    aspirin  81 mg Oral Daily    balsam peru-castor oiL   Topical (Top) BID    carvediloL  12.5 mg Oral BID WM    ceFEPime (MAXIPIME) IVPB  2 g Intravenous Q8H    ezetimibe  10 mg Oral QHS    fluconazole  400 mg Oral Q24H    linezolid  600 mg Oral Q12H    metroNIDAZOLE  500 mg Oral Q8H    pantoprazole  40 mg Oral Daily    polyethylene glycol  17 g Oral Daily    potassium, sodium phosphates  1 packet Oral QID (AC & HS)    prasugreL  10 mg Oral Daily    scopolamine  1 patch Transdermal Q3 Days     PRN Meds:   albuterol-ipratropium    ALPRAZolam    aluminum-magnesium hydroxide-simethicone    dextrose 10%    dextrose 10%    glucagon (human recombinant)    glucose    glucose    HYDROmorphone    insulin aspart U-100    oxyCODONE    prochlorperazine    sodium chloride 0.9%    sodium chloride 0.9%        Objective:     Vital Signs (Most Recent):  Temp: 97.5 °F (36.4 °C) (05/16/22 0752)  Pulse: 81 (05/16/22 0752)  Resp: 17 (05/16/22 0752)  BP: (!) 107/57 (05/16/22 0752)  SpO2: (!) 94 % (05/16/22 0752)   Vital Signs (24h Range):  Temp:  [96.1 °F (35.6 °C)-98.1 °F (36.7 °C)] 97.5 °F (36.4 °C)  Pulse:  [62-86] 81  Resp:  [16-20] 17  SpO2:  [92 %-96  %] 94 %  BP: ()/(51-57) 107/57     Intake/Output - Last 3 Shifts         05/14 0700  05/15 0659 05/15 0700  05/16 0659 05/16 0700  05/17 0659    P.O. 360 480     I.V. (mL/kg) 49.4 (0.8) 781.2 (13.2)     IV Piggyback 898.9 646.2     Total Intake(mL/kg) 1308.3 (22) 1907.4 (32.1)     Urine (mL/kg/hr) 350 (0.2) 1700 (1.2)     Emesis/NG output 0      Drains 5 15     Stool 0 525     Total Output 355 2240     Net +953.3 -332.6            Urine Occurrence 0 x      Stool Occurrence 0 x 0 x     Emesis Occurrence 0 x              Physical Exam  Constitutional:       Appearance: She is well-developed. She is ill-appearing.   HENT:      Head: Normocephalic.   Eyes:      Pupils: Pupils are equal, round, and reactive to light.   Cardiovascular:      Rate and Rhythm: Normal rate and regular rhythm.      Heart sounds: Normal heart sounds.   Pulmonary:      Effort: Pulmonary effort is normal. No respiratory distress.      Breath sounds: Normal breath sounds. No wheezing or rales.   Abdominal:      Palpations: Abdomen is soft. There is no mass.      Tenderness: There is no guarding or rebound.      Comments: Abd inc line healing well  PRASAD serous drainage  Cardona elissa uirne   Skin:     General: Skin is warm and dry.      Comments: Discoloration of sacrum, no skin breakdown ntoed   Neurological:      Mental Status: She is alert and oriented to person, place, and time.   Psychiatric:         Behavior: Behavior normal.         Thought Content: Thought content normal.         Judgment: Judgment normal.           Significant Labs:  BMP (Last 3 Results):   Recent Labs   Lab 05/14/22  0850 05/14/22  1529 05/15/22  0815 05/15/22  1029 05/16/22  0017 05/16/22  0607 05/16/22  0820      < > 101   < > 173* 147* 139*   *   < > 126*   < > 129* 127* 131*   K 2.7*   < > 3.5   < > 3.9 4.2 4.0   CL 89*   < > 88*   < > 92* 91* 95   CO2 31*   < > 29   < > 30* 28 28   BUN 11   < > 14   < > 15 16 16   CREATININE 0.6   < > 0.7   < > 0.6 0.6  0.6   CALCIUM 8.4*   < > 8.3*   < > 7.9* 7.7* 7.7*   MG 1.5*  --  1.7  --   --  1.9  --     < > = values in this interval not displayed.     CBC (Last 3 Results):   Recent Labs   Lab 05/14/22  0850 05/15/22  0636 05/16/22  0607   WBC 3.76* 6.39 7.52   RBC 4.00 4.08 3.93*   HGB 11.0* 11.3* 10.8*   HCT 34.2* 36.4* 33.2*    232 264   MCV 86 89 85   MCH 27.5 27.7 27.5   MCHC 32.2 31.0* 32.5       Significant Diagnostics:  None    Assessment/Plan:     Physical deconditioning  Cont PT/OT  Strongly enc pt to participate in care, becomes quite irate when providers suggest she attempt to [place]ke and care for her ostomy     Hyponatremia  Appreciate medicine recs  IVF intermittent   Monitor labs  Monitor I&O    Alteration in skin integrity in adult  Alteration in skin on sacrum  No skin breakfown  Appreciate wound care recs  Specialty bed  Strongly enc pt to eat and ambulate       Acute cholecystitis  OR 5/9    Urinary tract infection with pyuria  Consult to ID for hx of UTI with VRE, continuing antibiotics until seen by ID  Appreciate ID input, continue antibiotics 5-7 days post op, to be finished today 5/16    Colovesical fistula  Repaired during surgery 5/9  Cardona in place  Abx per ID, transitioned to PO due to high volume with IV, completion date 5/16    Primary hypertension  Chronic, controlled.  Latest blood pressure and vitals reviewed-   Temp:  [96.1 °F (35.6 °C)-98.1 °F (36.7 °C)]   Pulse:  [62-86]   Resp:  [16-20]   BP: ()/(51-57)   SpO2:  [92 %-96 %] .   Home meds for hypertension were reviewed and noted below.   Hypertension Medications             carvediloL (COREG) 12.5 MG tablet Take 1 tablet (12.5 mg total) by mouth 2 (two) times daily with meals.    nitroGLYCERIN (NITROSTAT) 0.4 MG SL tablet Place 1 tablet (0.4 mg total) under the tongue every 5 (five) minutes as needed for Chest pain.    spironolactone (ALDACTONE) 25 MG tablet Take 1 tablet (25 mg total) by mouth once daily.          While in the  hospital, will manage blood pressure as follows; Continue home antihypertensive regimen    Will utilize p.r.n. blood pressure medication only if patient's blood pressure greater than  180/110 and she develops symptoms such as worsening chest pain or shortness of breath.    Coronary artery disease of native artery of native heart with stable angina pectoris  Patient with known CAD s/p stent placement and CABG, which is controlled   Tele  Resume home meds    Acute diverticulitis  OR 5/9 sigmoid colectomy, end colostomy, takedown of fistuli, and cholecystectomy    -await return of bowel function, lfd, not eating, no appetite  -continue multi modality for pain control  -encourage ambulation and use of IS  -phani hose and SCD  -prophalactic PPI  -home effient resumed  -quinones in place for 8-10 days    Automatic implantable cardioverter-defibrillator in situ  Cont tele  Cont home meds  Monitor vs          Carola Buckley NP  Colorectal Surgery  [facility]

## 2022-05-16 NOTE — ASSESSMENT & PLAN NOTE
Chronic, controlled.  Latest blood pressure and vitals reviewed-   Temp:  [96.1 °F (35.6 °C)-98.1 °F (36.7 °C)]   Pulse:  [62-86]   Resp:  [16-20]   BP: ()/(51-57)   SpO2:  [92 %-96 %] .   Home meds for hypertension were reviewed and noted below.   Hypertension Medications             carvediloL (COREG) 12.5 MG tablet Take 1 tablet (12.5 mg total) by mouth 2 (two) times daily with meals.    nitroGLYCERIN (NITROSTAT) 0.4 MG SL tablet Place 1 tablet (0.4 mg total) under the tongue every 5 (five) minutes as needed for Chest pain.    spironolactone (ALDACTONE) 25 MG tablet Take 1 tablet (25 mg total) by mouth once daily.          While in the hospital, will manage blood pressure as follows; Continue home antihypertensive regimen    Will utilize p.r.n. blood pressure medication only if patient's blood pressure greater than  180/110 and she develops symptoms such as worsening chest pain or shortness of breath.

## 2022-05-16 NOTE — PLAN OF CARE
Problem: Occupational Therapy  Goal: Occupational Therapy Goal  Description: Goals to be met by: 5/24/22     Patient will increase functional independence with ADLs by performing:    Feeding with [place].  UE Dressing with [place].  LE Dressing with Stand-by Assistance.  Grooming while standing at sink with Supervision.  Toileting from toilet with Supervision for hygiene and clothing management.   Toilet transfer to toilet with Supervision.  Pt will engage in functional mobility to simulate household distances in order to maximize functional activity tolerance required for engagement in occupations of choice with supervision using LRAD.     Goals remain appropriate. Continue OT as tolerated.  Pt progressing in fx'l mobility tolerance.  Pita Hart OT  5/16/2022    Outcome: Ongoing, Progressing

## 2022-05-16 NOTE — ASSESSMENT & PLAN NOTE
OR 5/9 sigmoid colectomy, end colostomy, takedown of fistuli, and cholecystectomy    -await return of bowel function, lfd, not eating, no appetite  -continue multi modality for pain control  -encourage ambulation and use of IS  -phani hose and SCD  -prophalactic PPI  -home effient resumed  -quinones in place for 8-10 days

## 2022-05-16 NOTE — ASSESSMENT & PLAN NOTE
Alteration in skin on sacrum  No skin breakfown  Appreciate wound care recs  Specialty bed  Strongly enc pt to eat and ambulate

## 2022-05-17 NOTE — ASSESSMENT & PLAN NOTE
- follow up evaluation of sacral injury.  - pt transferred from [facility] with LUQ abdominal pain concerning for acute cholecystitis.  - pt underwent cholecystectomy and colostomy placement on 5/9.  - pt seen sitting up to chair on assessment c/o tenderness to sacral area.  - chair cushion in place.  - sacrum with small area of dark discoloration to periwound and deep purple center. Skin intact, no breakdown noted.  - foam dressing in place. Pt also with indwelling catheter.  - continue BPCO (Venelex) bid/prn.  - currently on [employer] surface with waffle overlay.  - specialty bed ordered 5/13. Will follow up on eta.  - nursing to maintain pressure injury prevention measures.  - nursing to continue scheduled wound care per orders.

## 2022-05-17 NOTE — PT/OT/SLP PROGRESS
"Physical Therapy Treatment    Patient Name:  Odette Hart   MRN:  [id number]  Admitting Diagnosis: Calculus of gallbladder with cholecystitis without biliary obstruction  Recent Surgery: Procedure(s) (LRB):  COLECTOMY, SIGMOID (ERAS High, lithotomy) (N/A)  COLONOSCOPY (N/A)  CYSTOSCOPY,WITH URETERAL CATHETER INSERTION (Bilateral)  CHOLECYSTECTOMY (N/A)  APPENDECTOMY (N/A)  EXCISION, CECUM (N/A)  CREATION, COLOSTOMY 8 Days Post-Op    Recommendations:     Discharge Recommendations:  nursing facility, skilled   Discharge Equipment Recommendations: bedside commode, walker, rolling   Barriers to discharge: Inaccessible home Pt requiring increased assistance at current time.     Plan:     During this hospitalization, patient to be seen 3 x/week to address the above listed problems via gait training, therapeutic activities, therapeutic exercises  · Plan of Care Expires:  06/10/22   Plan of Care Reviewed with: patient    This Plan of care has been discussed with the patient who was involved in its development and understands and is in agreement with the identified goals and treatment plan    Subjective     Communicated with nurse (Zita COLE) prior to session.     Patient comments: "I was having difficulty breathing" during gait trial  Pain/Comfort:  · Pain Rating 1: 0/10  · Pain Rating Post-Intervention 1: 0/10    Objective:     Patient found with: colostomy, PRASAD drain, oxygen, telemetry (waffle pad)    Patient found sup in bed with HOB at 40* angle upon PT entry to room, agreeable to treatment.  No family present in the room.    Respiratory Status: Nasal cannula, flow 2 L/min    General Precautions: Standard, fall, contact (VRE in urine)   Orthopedic Precautions:N/A   Braces: N/A       BED MOBILITY (vc's for hand placement sequencing of task):        Rolling to the R:  Min A.       Rolling to the L:  NT.        Sup > sit at the EOB:  Mod A for trunk elevation and to guide LE's, exiting on the R side, HOB at 15* " angle.       Sit > sup:  Not performed 2* pt left seated up in the chair.                      SITTING AT THE EDGE OF THE BED    Assistance Level Required: SBA for safety with B UE support   Postural deviations noted: flexed trunk   Encouraged: upright posture, B feet flat on the floor        TRANSFERS  (vc's for hand placement, sequencing of task and safety)   Patient completed Sit <> Stand Transfer from EOB/BSC with CGA/min A for hip elevation and balance with rolling walker x203 trial(s)   Patient completed Stand <> Sit Transfer to BSC/BS chair with CGA for balance and safety with rolling walker      GAIT: within room   Patient ambulated: 24ft   Patient required: min/CGA for balance and safety   Patient used:  Rolling Walker   Gait Pattern observed: reciprocal gait   Gait Deviation(s): decreased jan, increased time in double stance, decreased step length, decreased stride length, decreased toe-to-floor clearance and FFP    Comments: vc's for upright posture, placement of AD, directional guidance, appropriate breathing    Pt's SpO2 monitored during tx session and in remains between 98% and 100% while on 2LPM via NC.  HR fluctuated between 66 bpm and 120's bpm.  Nursing was notified      EDUCATION  Patient provided with daily orientation and goals of this PT session. They were educated to call for assistance and to transfer with hospital staff only.  Also, pt was educated on the effects of prolonged immobility and the importance of performing OOB activity and exercises to promote healing and reduce recovery time    Whiteboard updated with correct mobility information. RN/PCT notified.  Pt safe to transfer OOB/BTB and amb short distance with RN/PCT: Use RW with min/CGA.    Patient left up in chair, with  all lines intact, call button in reach and nurse notified    AM-PAC 6 CLICK MOBILITY  Turning over in bed (including adjusting bedclothes, sheets and blankets)?: 3  Sitting down on and standing up from a chair  with arms (e.g., wheelchair, bedside commode, etc.): 3  Moving from lying on back to sitting on the side of the bed?: 2  Moving to and from a bed to a chair (including a wheelchair)?: 3  Need to walk in hospital room?: 3  Climbing 3-5 steps with a railing?: 2  Basic Mobility Total Score: 16     Assessment:     Odette Hart is a 66 y.o. female admitted with a medical diagnosis of Calculus of gallbladder with cholecystitis without biliary obstruction.  She presents with the following impairments/functional limitations:  weakness, impaired endurance, impaired self care skills, impaired functional mobilty, gait instability, impaired balance, decreased upper extremity function, decreased lower extremity function, decreased safety awareness, edema, impaired cardiopulmonary response to activity. requiring significant assistance and verbal cues for bed mob and light for transfers and gait to prevent falls due to weakness, fatigue, SOB.   In light of pt's current functional level and deficits, it is anticipated that pt will need to participate in an intense rehab program consisting of PT and OT in order to achieve full rehab potential to return to previous level of function and roles.  Pt remains motivated to participate in PT session and will cont to benefit from skilled PT intervention.    Rehab Prognosis:  Fair/Good; patient would benefit from acute skilled PT services to address these deficits and reach maximum level of function.      GOALS:   Multidisciplinary Problems     Physical Therapy Goals        Problem: Physical Therapy    Goal Priority Disciplines Outcome Goal Variances Interventions   Physical Therapy Goal     PT, PT/OT Ongoing, Progressing     Description: Goals to be met by: 22     Patient will increase functional independence with mobility by performin. Supine to sit with Modified [place]  2. Sit to supine with Modified [place]  3. Sit to stand transfer with Supervision  4. Bed  to chair transfer with Supervision using LRAD  5. Gait  x 150 feet with Supervision using LRAD.   6. Ascend/descend 4 stair with bilateral Handrails and Supervision using LRAD.   7. Lower extremity exercise program x15 reps per handout, with supervision                     Time Tracking:     PT Received On: 05/17/22  PT Start Time: 1606     PT Stop Time: 1653  PT Total Time (min): 47 min     Billable Minutes: Gait Training 10 and Therapeutic Activity 37    Treatment Type: Treatment  PT/PTA: PTA     PTA Visit Number: 1       FRANCIS Kearney.  Pager [phone]    5/17/2022    .

## 2022-05-17 NOTE — SUBJECTIVE & OBJECTIVE
Subjective:     Interval History: no acute events overnite, nausea slighltyh better, did participate in ostomy lesson, still not eating, c/o of feeling like food gets stuck in her throat     Post-Op Info:  Procedure(s) (LRB):  COLECTOMY, SIGMOID (ERAS High, lithotomy) (N/A)  COLONOSCOPY (N/A)  CYSTOSCOPY,WITH URETERAL CATHETER INSERTION (Bilateral)  CHOLECYSTECTOMY (N/A)  APPENDECTOMY (N/A)  EXCISION, CECUM (N/A)  CREATION, COLOSTOMY   8 Days Post-Op      Medications:  Continuous Infusions:  Scheduled Meds:   acetaminophen  1,000 mg Oral Q8H    aspirin  81 mg Oral Daily    balsam peru-castor oiL   Topical (Top) BID    carvediloL  12.5 mg Oral BID WM    ezetimibe  10 mg Oral QHS    pantoprazole  40 mg Oral Daily    polyethylene glycol  17 g Oral Daily    prasugreL  10 mg Oral Daily    scopolamine  1 patch Transdermal Q3 Days     PRN Meds:   albuterol-ipratropium    ALPRAZolam    aluminum-magnesium hydroxide-simethicone    dextrose 10%    dextrose 10%    glucagon (human recombinant)    glucose    glucose    insulin aspart U-100    oxyCODONE    prochlorperazine    sodium chloride 0.9%    sodium chloride 0.9%        Objective:     Vital Signs (Most Recent):  Temp: 96.6 °F (35.9 °C) (05/17/22 0714)  Pulse: 75 (05/17/22 0714)  Resp: 17 (05/17/22 0714)  BP: (!) 100/59 (05/17/22 0714)  SpO2: 100 % (05/17/22 0714)   Vital Signs (24h Range):  Temp:  [96.2 °F (35.7 °C)-98 °F (36.7 °C)] 96.6 °F (35.9 °C)  Pulse:  [] 75  Resp:  [16-20] 17  SpO2:  [93 %-100 %] 100 %  BP: ()/(50-61) 100/59     Intake/Output - Last 3 Shifts         05/15 0700  05/16 0659 05/16 0700  05/17 0659 05/17 0700  05/18 0659    P.O. 480 600     I.V. (mL/kg) 781.2 (13.2) 1155.6 (19.5)     IV Piggyback 646.2      Total Intake(mL/kg) 1907.4 (32.1) 1755.6 (29.6)     Urine (mL/kg/hr) 1700 (1.2) 550 (0.4)     Emesis/NG output       Drains 15 10     Stool 525 500     Total Output 2240 1060     Net -332.6 +695.6            Stool Occurrence 0 x               Physical Exam  Vitals and nursing note reviewed.   Constitutional:       Appearance: She is well-developed.   HENT:      Head: Normocephalic.   Eyes:      Pupils: Pupils are equal, round, and reactive to light.   Cardiovascular:      Rate and Rhythm: Normal rate and regular rhythm.      Heart sounds: Normal heart sounds.   Pulmonary:      Effort: Pulmonary effort is normal. No respiratory distress.      Breath sounds: Normal breath sounds. No wheezing or rales.   Abdominal:      Palpations: Abdomen is soft. There is no mass.      Tenderness: There is no guarding or rebound.      Comments: Abd in mack healing well  Colostomy functional  Arturo fluid negative for creatinine   Cardona dced   Skin:     General: Skin is warm and dry.   Neurological:      Mental Status: She is alert and oriented to person, place, and time.   Psychiatric:         Behavior: Behavior normal.         Thought Content: Thought content normal.         Judgment: Judgment normal.           Significant Labs:  BMP (Last 3 Results):   Recent Labs   Lab 05/15/22  0815 05/15/22  1029 05/16/22  0607 05/16/22  0820 05/17/22  0017 05/17/22  0428 05/17/22  0952      < > 147*   < > 127* 96 119*   *   < > 127*   < > 134* 132* 132*   K 3.5   < > 4.2   < > 4.1 3.8 4.0   CL 88*   < > 91*   < > 99 98 98   CO2 29   < > 28   < > 27 25 27   BUN 14   < > 16   < > 13 13 11   CREATININE 0.7   < > 0.6   < > 0.6 0.5 0.6   CALCIUM 8.3*   < > 7.7*   < > 7.6* 8.1* 8.1*   MG 1.7  --  1.9  --   --  2.0  --     < > = values in this interval not displayed.     CBC (Last 3 Results):   Recent Labs   Lab 05/15/22  0636 05/16/22  0607 05/17/22  0428   WBC 6.39 7.52 5.92   RBC 4.08 3.93* 3.81*   HGB 11.3* 10.8* 10.5*   HCT 36.4* 33.2* 33.4*    264 234   MCV 89 85 88   MCH 27.7 27.5 27.6   MCHC 31.0* 32.5 31.4*       Significant Diagnostics:  None

## 2022-05-17 NOTE — ASSESSMENT & PLAN NOTE
- patient exhibiting signs of depression and anxiety. Poorly motivated. Not engaging much in care  - Appreciate psychology

## 2022-05-17 NOTE — ASSESSMENT & PLAN NOTE
Chronic, controlled.  Latest blood pressure and vitals reviewed-   Temp:  [96.2 °F (35.7 °C)-98 °F (36.7 °C)]   Pulse:  []   Resp:  [16-20]   BP: ()/(50-61)   SpO2:  [93 %-100 %] .   Home meds for hypertension were reviewed and noted below.   Hypertension Medications             carvediloL (COREG) 12.5 MG tablet Take 1 tablet (12.5 mg total) by mouth 2 (two) times daily with meals.    nitroGLYCERIN (NITROSTAT) 0.4 MG SL tablet Place 1 tablet (0.4 mg total) under the tongue every 5 (five) minutes as needed for Chest pain.    spironolactone (ALDACTONE) 25 MG tablet Take 1 tablet (25 mg total) by mouth once daily.          While in the hospital, will manage blood pressure as follows; Continue home antihypertensive regimen    Will utilize p.r.n. blood pressure medication only if patient's blood pressure greater than  180/110 and she develops symptoms such as worsening chest pain or shortness of breath.

## 2022-05-17 NOTE — PT/OT/SLP EVAL
Speech Language Pathology Evaluation  Bedside Swallow    Patient Name:  Odette Hart   MRN:  [id number]  Admitting Diagnosis: Calculus of gallbladder with cholecystitis without biliary obstruction    Recommendations:                 General Recommendations:  Dysphagia therapy  Diet recommendations:  Regular, Thin   Aspiration Precautions: Standard aspiration precautions   General Precautions: Standard, fall  Communication strategies:  none    History:     Past Medical History:   Diagnosis Date    Acute coronary syndrome     Allergy     Arthritis     Cardiomyopathy     CHF (congestive heart failure)     Coronary artery disease     Coronary artery disease of native artery of native heart with stable angina pectoris 2021: [facility]: Cath: LAD: Proximal stent patent. LCX: Proximal 80%. LCX: Dominant. Moderate disease. LCX: HEVER 2.75 x 18 mm. Distal dissection. HEVER 2.75 x 22 mm.     Diverticulitis     Diverticulosis     Familial hypercholesterolemia 2022    Former smoker 2022    Heart attack     Heart disease     History of myocardial infarction 2022    Hyperlipidemia     Hypertension     Hypertension 2022    ICD (implantable cardioverter-defibrillator) in place     Non-ST elevation myocardial infarction (NSTEMI) 2019       Past Surgical History:   Procedure Laterality Date    APPENDECTOMY N/A 2022    Procedure: APPENDECTOMY;  Surgeon: Rafa Cardozo MD;  Location: [facility] OR [address] [place] [address];  Service: Colon and Rectal;  Laterality: N/A;    ATRIAL CARDIAC PACEMAKER INSERTION      CECECTOMY N/A 2022    Procedure: EXCISION, CECUM;  Surgeon: Rafa Cardozo MD;  [facility];  Service: Colon and Rectal;  Laterality: N/A;     SECTION      CHOLECYSTECTOMY N/A 2022    Procedure: CHOLECYSTECTOMY;  Surgeon: Doug Epstein MD;  [facility];  Service: General;  Laterality: N/A;    COLONOSCOPY N/A 2022    Procedure:  "COLONOSCOPY;  Surgeon: Rafa Cardozo MD;  Location: [facility] OR [address] [place] [address];  Service: Colon and Rectal;  Laterality: N/A;    COLOSTOMY  5/9/2022    Procedure: CREATION, COLOSTOMY;  Surgeon: Rafa Cardozo MD;  [facility];  Service: Colon and Rectal;;    CORONARY STENT PLACEMENT      LEFT HEART CATHETERIZATION Left 1/24/2022    Procedure: CATHETERIZATION, HEART, LEFT;  Surgeon: Yohannes Cordova MD;  Location: [facility] [place] CATH LAB;  Service: Cardiology;  Laterality: Left;       Prior Intubation HX:  5/9/22 6916-0506    Prior diet: regular/thin    Subjective     Awake/alert  "I just feel like my throat and mouth are so dry."     Pain/Comfort:  · Pain Rating 1: 0/10  · Pain Rating Post-Intervention 1: 0/10    Respiratory Status: Room air    Objective:     Oral Musculature Evaluation  · Oral Musculature: WFL  · Dentition: present and adequate  · Oral Labial Strength and Mobility: WFL  · Lingual Strength and Mobility: WFL  · Voice Prior to PO Intake: clear    Bedside Swallow Eval:   Consistencies Assessed:  · Thin liquids x6 cup/straw  · Solids x3     Oral Phase:   · WFL    Pharyngeal Phase:   · no overt clinical signs/symptoms of aspiration; however pt stated "Sometimes things just stick". SLP educated pt on diet recs and swallow precautions. SLP will follow up.       Assessment:     Odette Hart is a 66 y.o. female with an SLP diagnosis of Dysphagia.     Goals:   Multidisciplinary Problems     SLP Goals     Not on file                Plan:     · Patient to be seen:  3 x/week   · Plan of Care reviewed with:  patient   · SLP Follow-Up:  Yes       Discharge recommendations:  nursing facility, skilled       Time Tracking:     SLP Treatment Date:   05/17/22  Speech Start Time:  0822  Speech Stop Time:  0832     Speech Total Time (min):  10 min    Billable Minutes: Eval Swallow and Oral Function 10    05/17/2022           "

## 2022-05-17 NOTE — SUBJECTIVE & OBJECTIVE
Subjective:     HPI:  Odette Hart is a 66 year old female with a PMHx of CAD s/p 2x HEVER placement in 1/2022 (on effient), CHF (EF 20%), ICD, and colovesicular fistula 2/2 complicated diverticulitis who presented to the ED with concerns for acute cholecystitis found on ultrasound. Patient was at [facility] and has been complaining of constant LUQ abdominal pain, poor PO intake, and N/V. She has been having abdominal pain, nausea/vomiting, and inability to tolerate PO since she was diagnosed with diverticulitis. They thought her symptoms were due to flagyl and it was discontinued with improvement, however her symptoms returned 10 days ago. She had a CT of her abdomen on 05/04 which showed improving diverticulitis, persistent colovesicular fistula and distended gallbladder with pericholecystic fluid. She was admitted to hospital medicine for management. Gen surg and CRS consulted. Pt is now s/p sigmoid colectomy, cholecystectomy, colovagino/vesico fistula resection, washout, and appendectomy with colostomy placement on 5/9. Wound care consulted for evaluation of skin injury.          Hospital Course:   Interval history: Pt seen sitting in chair. C/o tenderness to sacrum. Chair cushion being used.       Follow-up For: Procedure(s) (LRB):  COLECTOMY, SIGMOID (ERAS High, lithotomy) (N/A)  COLONOSCOPY (N/A)  CYSTOSCOPY,WITH URETERAL CATHETER INSERTION (Bilateral)  CHOLECYSTECTOMY (N/A)  APPENDECTOMY (N/A)  EXCISION, CECUM (N/A)  CREATION, COLOSTOMY    Post-Operative Day: 8 Days Post-Op    Scheduled Meds:   acetaminophen  1,000 mg Oral Q8H    aspirin  81 mg Oral Daily    balsam peru-castor oiL   Topical (Top) BID    carvediloL  12.5 mg Oral BID WM    ezetimibe  10 mg Oral QHS    pantoprazole  40 mg Oral Daily    polyethylene glycol  17 g Oral Daily    prasugreL  10 mg Oral Daily    scopolamine  1 patch Transdermal Q3 Days     Continuous Infusions:  PRN Meds:albuterol-ipratropium, ALPRAZolam, aluminum-magnesium  hydroxide-simethicone, dextrose 10%, dextrose 10%, glucagon (human recombinant), glucose, glucose, insulin aspart U-100, oxyCODONE, prochlorperazine, sodium chloride 0.9%, sodium chloride 0.9%    Review of Systems   Skin:  Positive for wound.   Objective:     Vital Signs (Most Recent):  Temp: 96.6 °F (35.9 °C) (05/17/22 0714)  Pulse: 75 (05/17/22 0714)  Resp: 17 (05/17/22 0714)  BP: (!) 100/59 (05/17/22 0714)  SpO2: 100 % (05/17/22 0714)   Vital Signs (24h Range):  Temp:  [96.2 °F (35.7 °C)-98 °F (36.7 °C)] 96.6 °F (35.9 °C)  Pulse:  [] 75  Resp:  [16-18] 17  SpO2:  [93 %-100 %] 100 %  BP: ()/(50-59) 100/59     Weight: 59.4 kg (130 lb 15.3 oz)  Body mass index is 23.2 kg/m².    Physical Exam  Constitutional:       Appearance: Normal appearance.   Skin:     General: Skin is warm and dry.      Findings: Lesion present.   Neurological:      Mental Status: She is alert.       Laboratory:  All pertinent labs reviewed within the last 24 hours.    Diagnostic Results:  None

## 2022-05-17 NOTE — ASSESSMENT & PLAN NOTE
OR 5/9 sigmoid colectomy, end colostomy, takedown of fistuli, and cholecystectomy    -await return of bowel function, lfd, not eating, no appetite  -continue multi modality for pain control  -encourage ambulation and use of IS  -phani hose and SCD  -prophalactic PPI  -home effient resumed  -quinones dced after negative creatitine from [facility]

## 2022-05-17 NOTE — SUBJECTIVE & OBJECTIVE
Interval History: Na improving 131 > 134 > 132. Continue gentle fluids through today. Encouraged oral. Evaluated by psychology consulted for depression and anxiety. Poor motivation to engage in care. Still with SOB and dizziness on exertion. VQ scan to r/o PE.    Review of Systems   Constitutional:  Negative for chills, fatigue and fever.   HENT:  Negative for congestion and rhinorrhea.    Eyes:  Negative for visual disturbance.   Respiratory:  Positive for shortness of breath. Negative for cough.    Cardiovascular:  Negative for chest pain, palpitations and leg swelling.   Gastrointestinal:  Positive for constipation and nausea. Negative for abdominal pain, diarrhea and vomiting.   Genitourinary:  Negative for dysuria and urgency.   Musculoskeletal:  Negative for back pain.   Skin:  Negative for rash.   Neurological:  Positive for light-headedness. Negative for dizziness, syncope, weakness and headaches.   Psychiatric/Behavioral:  Negative for agitation and confusion.    All other systems reviewed and are negative.  Objective:     Vital Signs (Most Recent):  Temp: 96.6 °F (35.9 °C) (05/17/22 0714)  Pulse: 75 (05/17/22 0714)  Resp: 17 (05/17/22 0714)  BP: (!) 100/59 (05/17/22 0714)  SpO2: 100 % (05/17/22 0714)   Vital Signs (24h Range):  Temp:  [96.2 °F (35.7 °C)-98 °F (36.7 °C)] 96.6 °F (35.9 °C)  Pulse:  [] 75  Resp:  [16-20] 17  SpO2:  [93 %-100 %] 100 %  BP: ()/(50-61) 100/59     Weight: 59.4 kg (130 lb 15.3 oz)  Body mass index is 23.2 kg/m².    Intake/Output Summary (Last 24 hours) at 5/17/2022 1300  Last data filed at 5/17/2022 1200  Gross per 24 hour   Intake 1895.62 ml   Output 1060 ml   Net 835.62 ml        Physical Exam  Constitutional:       Appearance: Normal appearance. She is well-developed.   HENT:      Head: Normocephalic and atraumatic.      Mouth/Throat:      Mouth: Mucous membranes are moist.   Eyes:      Conjunctiva/sclera: Conjunctivae normal.   Neck:      Comments: No  JVD  Cardiovascular:      Rate and Rhythm: Normal rate and regular rhythm.      Heart sounds: No murmur heard.  Pulmonary:      Effort: Pulmonary effort is normal. No respiratory distress.      Breath sounds: Normal breath sounds. No wheezing or rales.      Comments: On RA  Abdominal:      General: There is no distension.      Palpations: Abdomen is soft.      Tenderness: There is abdominal tenderness (improving daily).      Comments: Drain with serosanguinous ouput. Colostomy bag with minimal stool and gas, incision site c/d/i   Musculoskeletal:      Right lower leg: No edema.      Left lower leg: No edema.      Comments: PICC in RUE   Skin:     General: Skin is warm.   Neurological:      General: No focal deficit present.      Mental Status: She is alert and oriented to person, place, and time.      Comments: Appears more tired today, muttered speech this afternoon        Significant Labs: All pertinent labs within the past 24 hours have been reviewed.  CBC:   Recent Labs   Lab 05/16/22  0607 05/17/22  0428   WBC 7.52 5.92   HGB 10.8* 10.5*   HCT 33.2* 33.4*    234       CMP:   Recent Labs   Lab 05/16/22  0607 05/16/22  0820 05/17/22  0017 05/17/22  0428 05/17/22  0952   *   < > 134* 132* 132*   K 4.2   < > 4.1 3.8 4.0   CL 91*   < > 99 98 98   CO2 28   < > 27 25 27   *   < > 127* 96 119*   BUN 16   < > 13 13 11   CREATININE 0.6   < > 0.6 0.5 0.6   CALCIUM 7.7*   < > 7.6* 8.1* 8.1*   PROT 6.0  --   --  6.4  --    ALBUMIN 1.8*  --   --  1.7*  --    BILITOT 0.9  --   --  0.7  --    ALKPHOS 88  --   --  96  --    AST 19  --   --  22  --    ALT 12  --   --  11  --    ANIONGAP 8   < > 8 9 7*   EGFRNONAA >60.0   < > >60.0 >60.0 >60.0    < > = values in this interval not displayed.         Significant Imaging: I have reviewed all pertinent imaging results/findings within the past 24 hours.

## 2022-05-17 NOTE — PLAN OF CARE
[facility]  Discharge Reassessment    Primary Care Provider: Braxton Barragan MD    Expected Discharge Date: 5/18/2022    Reassessment (most recent)     Discharge Reassessment - 05/17/22 1526        Discharge Reassessment    Assessment Type Discharge Planning Reassessment     Discharge Plan A Skilled Nursing Facility     Discharge Plan B Home Health     DME Needed Upon Discharge  walker, rolling;bedside commode     Discharge Barriers Identified None     Why the patient remains in the hospital Requires continued medical care               Nahomy Jalloh RN, CM   Ext: [phone]

## 2022-05-17 NOTE — PROGRESS NOTES
[facility] Medicine  Progress Note    Patient Name: Odette Hart  MRN: [id number]  Patient Class: IP- Inpatient   Admission Date: 5/6/2022  Length of Stay: 8 days  Attending Physician: Rafa Cardozo MD  Primary Care Provider: Braxton Barragan MD        Subjective:     Principal Problem:Calculus of gallbladder with cholecystitis without biliary obstruction        HPI:  Odette Hart is a 66 y.o. 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 Patient was at [facility] and has been complaining of constant LUQ abdominal pain, poor PO intake, and N/V. She has been having abdominal pain, nausea/vomiting, and inability to tolerate PO since she was diagnosed with diverticulitis. They thought her symptoms were due to flagyl and it was discontinued with improvement, however her symptoms returned 10 days ago. She had a CT of her abdomen on 05/04 which showed improving diverticulitis, persistent colovesicular fistula and distended gallbladder with pericholecystic fluid. She denies any fevers, chills, chest pain, dysuria.    General surgery consulted in ED and recommend HIDA.        Overview/Hospital Course:  Ms. Hart was admitted to Hospital Medicine for management of acute cholecystitis.  Gen Surg was consulted and HIDA was ordered.  CRS was consulted who plan for corrective surgery of her colovesical fistula with ostomy placement on 5/9 with combined cholecystectomy. Transferred to CRS service,  consulted for continued care in setting of ADHF. Started on diuresis with lasix 40mg IV BID, symptoms and oxygen requirements now much improved. Repeat echo unchanged with IVC of 3cm so lasix stopped. Now with decreased UOP and downtrending sodium. Will given gentle IVFs. Encouraged oral. Na improving. Psychology consulted for  depression and anxiety. Poor motivation to engage in care. Still with SOB and dizziness on exertion. VQ scan to r/o PE.          Interval History: Na improving 131 > 134 > 132. Continue gentle fluids through today. Encouraged oral. Evaluated by psychology consulted for depression and anxiety. Poor motivation to engage in care. Still with SOB and dizziness on exertion. VQ scan to r/o PE.    Review of Systems   Constitutional:  Negative for chills, fatigue and fever.   HENT:  Negative for congestion and rhinorrhea.    Eyes:  Negative for visual disturbance.   Respiratory:  Positive for shortness of breath. Negative for cough.    Cardiovascular:  Negative for chest pain, palpitations and leg swelling.   Gastrointestinal:  Positive for constipation and nausea. Negative for abdominal pain, diarrhea and vomiting.   Genitourinary:  Negative for dysuria and urgency.   Musculoskeletal:  Negative for back pain.   Skin:  Negative for rash.   Neurological:  Positive for light-headedness. Negative for dizziness, syncope, weakness and headaches.   Psychiatric/Behavioral:  Negative for agitation and confusion.    All other systems reviewed and are negative.  Objective:     Vital Signs (Most Recent):  Temp: 96.6 °F (35.9 °C) (05/17/22 0714)  Pulse: 75 (05/17/22 0714)  Resp: 17 (05/17/22 0714)  BP: (!) 100/59 (05/17/22 0714)  SpO2: 100 % (05/17/22 0714)   Vital Signs (24h Range):  Temp:  [96.2 °F (35.7 °C)-98 °F (36.7 °C)] 96.6 °F (35.9 °C)  Pulse:  [] 75  Resp:  [16-20] 17  SpO2:  [93 %-100 %] 100 %  BP: ()/(50-61) 100/59     Weight: 59.4 kg (130 lb 15.3 oz)  Body mass index is 23.2 kg/m².    Intake/Output Summary (Last 24 hours) at 5/17/2022 1300  Last data filed at 5/17/2022 1200  Gross per 24 hour   Intake 1895.62 ml   Output 1060 ml   Net 835.62 ml        Physical Exam  Constitutional:       Appearance: Normal appearance. She is well-developed.   HENT:      Head: Normocephalic and atraumatic.      Mouth/Throat:       Mouth: Mucous membranes are moist.   Eyes:      Conjunctiva/sclera: Conjunctivae normal.   Neck:      Comments: No JVD  Cardiovascular:      Rate and Rhythm: Normal rate and regular rhythm.      Heart sounds: No murmur heard.  Pulmonary:      Effort: Pulmonary effort is normal. No respiratory distress.      Breath sounds: Normal breath sounds. No wheezing or rales.      Comments: On RA  Abdominal:      General: There is no distension.      Palpations: Abdomen is soft.      Tenderness: There is abdominal tenderness (improving daily).      Comments: Drain with serosanguinous ouput. Colostomy bag with minimal stool and gas, incision site c/d/i   Musculoskeletal:      Right lower leg: No edema.      Left lower leg: No edema.      Comments: PICC in RUE   Skin:     General: Skin is warm.   Neurological:      General: No focal deficit present.      Mental Status: She is alert and oriented to person, place, and time.      Comments: Appears more tired today, muttered speech this afternoon        Significant Labs: All pertinent labs within the past 24 hours have been reviewed.  CBC:   Recent Labs   Lab 05/16/22  0607 05/17/22  0428   WBC 7.52 5.92   HGB 10.8* 10.5*   HCT 33.2* 33.4*    234       CMP:   Recent Labs   Lab 05/16/22  0607 05/16/22  0820 05/17/22  0017 05/17/22  0428 05/17/22  0952   *   < > 134* 132* 132*   K 4.2   < > 4.1 3.8 4.0   CL 91*   < > 99 98 98   CO2 28   < > 27 25 27   *   < > 127* 96 119*   BUN 16   < > 13 13 11   CREATININE 0.6   < > 0.6 0.5 0.6   CALCIUM 7.7*   < > 7.6* 8.1* 8.1*   PROT 6.0  --   --  6.4  --    ALBUMIN 1.8*  --   --  1.7*  --    BILITOT 0.9  --   --  0.7  --    ALKPHOS 88  --   --  96  --    AST 19  --   --  22  --    ALT 12  --   --  11  --    ANIONGAP 8   < > 8 9 7*   EGFRNONAA >60.0   < > >60.0 >60.0 >60.0    < > = values in this interval not displayed.         Significant Imaging: I have reviewed all pertinent imaging results/findings within the past 24  hours.      Assessment/Plan:      * Calculus of gallbladder with cholecystitis without biliary obstruction  Acute diverticulitis  Colovesical fistula  · LUQ, N/V for past few months, worsened over the last few days   · US with cholelithiasis, gallbladder wall thickening and pericholecystic fluid.  No sonographic Rasmussen's sign or wall hyperemia.    · HIDA ordered to determine if her cystic duct in patent  · S/p cholecystectomy with general surgery and ex-lap with ostomy creation with CRS on 5/9  · Finish 5/16 cefepime and flagyl x 7 days post op    Hyponatremia  - urine studies and exam consistent with hypovolemic hyponatremia  - giving NS and trending BMPs  - Na improving 131 > 134 > 132. Continue gentle fluids through today. Encouraged oral.     Prolonged Q-T interval on ECG  QTc 581 on admit  - Switched antiemetics to compazine  - monitor electrolytes   - avoid other QT prolonging meds    Alteration in skin integrity in adult  - sacral wound, wound care following       Mood disorder  - patient exhibiting signs of depression and anxiety. Poorly motivated. Not engaging much in care  - Appreciate psychology    Dizziness  - reports dizziness on exertion/standing. Possibly related to BP or multifactorial given acute illness, pain medication, and hypovolemia. Hgb stable   - may benefit from dose of meclizine if continues and QT improved  - Continued dizziness and SOB on exertion. At risk for PE in the setting of immobilization /hospitalization /surgery. VQ scan to rule out (defer CTA given critical global shortage of contrast)      Acute decompensated heart failure  · Combined HF with EF 20%  · Radiographic evidence of pulmonary edema  · BNP ~ 3000  · Continue Coreg 12.5mg BID  · Holding further diuresis for now  · Low sodium diet with 1.5 L fluid restriction  · Strict I&Os, daily weights    - BNP remains elevated but repeat TTE with small IVC, volume overload resolved    Results for orders placed during the hospital  encounter of 05/06/22    Echo    Interpretation Summary  · The left ventricle is severely enlarged with eccentric hypertrophy and severely decreased systolic function. The estimated ejection fraction is 20%.  · Mild right ventricular enlargement with normal right ventricular systolic function.  · Grade II left ventricular diastolic dysfunction.  · Severe left atrial enlargement.  · Mild-to-moderate mitral regurgitation.  · Mild tricuspid regurgitation.  · The estimated PA systolic pressure is 35 mmHg.  · Normal central venous pressure (3 mmHg).        Anemia  · Stable.   · Blood smear notable for Rouleaux formation      Acute cholecystitis  · As above      Urinary tract infection with pyuria  · In April - Pseudomonas / Enterobacteroid acute cystitis without hematuria  · Urine cx positive for MDR VRE. ID following, continue  linezolid x 7 days (end date 5/17)  · Finish fluconazole for yeast 5/16    Colovesical fistula  · Continue Cefepime and Flagyl  S/p cholecystectomy with general surgery and ex-lap with ostomy creation with CRS on     Primary hypertension  · Chronic and stable  · Continue Coreg 12.5mg BID    Familial hypercholesterolemia  Continue Zetia  Might need Niacin    Coronary artery disease of native artery of native heart with stable angina pectoris  · Chronic and stable  · Continue Aspirin 81mg PO daily  · Continue Coreg 12.5mg PO BID  · Prasugrel restarted 5/14    Acute diverticulitis  · Chronic issue  · Has been on TPN  · CRS following    Automatic implantable cardioverter-defibrillator in situ  · Chronic and stable  · No acute issues      VTE Risk Mitigation (From admission, onward)         Ordered     IP VTE HIGH RISK PATIENT  Once         05/06/22 2359     Place sequential compression device  Until discontinued         05/06/22 5148                    Analy Garrison DO  Department of Hospital Medicine   [facility]

## 2022-05-17 NOTE — PLAN OF CARE
Patient alert and oriented, noticed patient had lack of interest in getting out of bed, not wanting to eat much. Quinones removed, patient urinated immediately after removing quinones. Patient up with chair. IVF stopped.   Problem: Adult Inpatient Plan of Care  Goal: Plan of Care Review  Outcome: Ongoing, Progressing  Flowsheets (Taken 5/17/2022 1529)  Plan of Care Reviewed With: patient  Goal: Absence of Hospital-Acquired Illness or Injury  Outcome: Ongoing, Progressing  Intervention: Identify and Manage Fall Risk  Flowsheets (Taken 5/17/2022 1529)  Safety Promotion/Fall Prevention:   assistive device/personal item within reach   Fall Risk reviewed with patient/family   Fall Risk signage in place   lighting adjusted   medications reviewed   nonskid shoes/socks when out of bed   side rails raised x 2   instructed to call staff for mobility  Intervention: Prevent Skin Injury  Flowsheets (Taken 5/17/2022 1529)  Body Position: position changed independently  Skin Protection:   adhesive use limited   incontinence pads utilized  Intervention: Prevent and Manage VTE (Venous Thromboembolism) Risk  Flowsheets (Taken 5/17/2022 1529)  Activity Management:   Arm raise - L1   Rolling - L1  VTE Prevention/Management:   remove, assess skin, and reapply sequential compression device   dorsiflexion/plantar flexion performed  Range of Motion: active ROM (range of motion) encouraged  Intervention: Prevent Infection  Flowsheets (Taken 5/17/2022 1529)  Infection Prevention:   environmental surveillance performed   equipment surfaces disinfected   hand hygiene promoted   personal protective equipment utilized   rest/sleep promoted   single patient room provided  Goal: Optimal Comfort and Wellbeing  Outcome: Ongoing, Progressing  Intervention: Monitor Pain and Promote Comfort  Flowsheets (Taken 5/17/2022 1529)  Pain Management Interventions:   awakened for pain meds per patient request   care clustered  Intervention:  Provide Person-Centered Care  Flowsheets (Taken 5/17/2022 1529)  Trust Relationship/Rapport:   care explained   choices provided   emotional support provided   empathic listening provided   questions answered   questions encouraged   reassurance provided     Problem: Infection  Goal: Absence of Infection Signs and Symptoms  Outcome: Ongoing, Progressing     Problem: Impaired Wound Healing  Goal: Optimal Wound Healing  Outcome: Ongoing, Progressing  Intervention: Promote Wound Healing  Flowsheets (Taken 5/17/2022 1529)  Oral Nutrition Promotion: rest periods promoted  Sleep/Rest Enhancement:   awakenings minimized   consistent schedule promoted  Activity Management:   Arm raise - L1   Rolling - L1  Pain Management Interventions:   awakened for pain meds per patient request   care clustered     Problem: Skin Injury Risk Increased  Goal: Skin Health and Integrity  Outcome: Ongoing, Progressing  Intervention: Optimize Skin Protection  Flowsheets (Taken 5/17/2022 1529)  Pressure Reduction Techniques:   frequent weight shift encouraged   weight shift assistance provided  Pressure Reduction Devices: specialty bed utilized  Skin Protection:   adhesive use limited   incontinence pads utilized  Head of Bed (HOB) Positioning: HOB elevated

## 2022-05-17 NOTE — ASSESSMENT & PLAN NOTE
- reports dizziness on exertion/standing. Possibly related to BP or multifactorial given acute illness, pain medication, and hypovolemia. Hgb stable   - may benefit from dose of meclizine if continues and QT improved  - Continued dizziness and SOB on exertion. At risk for PE in the setting of immobilization /hospitalization /surgery. VQ scan to rule out (defer CTA given critical global shortage of contrast)

## 2022-05-17 NOTE — ASSESSMENT & PLAN NOTE
· In April - Pseudomonas / Enterobacteroid acute cystitis without hematuria  · Urine cx positive for MDR VRE. ID following, continue  linezolid x 7 days (end date 5/17)  · Finish fluconazole for yeast 5/16

## 2022-05-17 NOTE — ASSESSMENT & PLAN NOTE
QTc 581 on admit  - Switched antiemetics to compazine  - monitor electrolytes   - avoid other QT prolonging meds

## 2022-05-17 NOTE — PROGRESS NOTES
[facility]  Colorectal Surgery  Progress Note    Patient Name: Odette Hart  MRN: [id number]  Admission Date: 5/6/2022  Hospital Length of Stay: 8 days  Attending Physician: Rafa Cardozo MD    Subjective:     Interval History: no acute events overnite, nausea slighltyh better, did participate in ostomy lesson, still not eating, c/o of feeling like food gets stuck in her throat     Post-Op Info:  Procedure(s) (LRB):  COLECTOMY, SIGMOID (ERAS High, lithotomy) (N/A)  COLONOSCOPY (N/A)  CYSTOSCOPY,WITH URETERAL CATHETER INSERTION (Bilateral)  CHOLECYSTECTOMY (N/A)  APPENDECTOMY (N/A)  EXCISION, CECUM (N/A)  CREATION, COLOSTOMY   8 Days Post-Op      Medications:  Continuous Infusions:  Scheduled Meds:   acetaminophen  1,000 mg Oral Q8H    aspirin  81 mg Oral Daily    balsam peru-castor oiL   Topical (Top) BID    carvediloL  12.5 mg Oral BID WM    ezetimibe  10 mg Oral QHS    pantoprazole  40 mg Oral Daily    polyethylene glycol  17 g Oral Daily    prasugreL  10 mg Oral Daily    scopolamine  1 patch Transdermal Q3 Days     PRN Meds:   albuterol-ipratropium    ALPRAZolam    aluminum-magnesium hydroxide-simethicone    dextrose 10%    dextrose 10%    glucagon (human recombinant)    glucose    glucose    insulin aspart U-100    oxyCODONE    prochlorperazine    sodium chloride 0.9%    sodium chloride 0.9%        Objective:     Vital Signs (Most Recent):  Temp: 96.6 °F (35.9 °C) (05/17/22 0714)  Pulse: 75 (05/17/22 0714)  Resp: 17 (05/17/22 0714)  BP: (!) 100/59 (05/17/22 0714)  SpO2: 100 % (05/17/22 0714)   Vital Signs (24h Range):  Temp:  [96.2 °F (35.7 °C)-98 °F (36.7 °C)] 96.6 °F (35.9 °C)  Pulse:  [] 75  Resp:  [16-20] 17  SpO2:  [93 %-100 %] 100 %  BP: ()/(50-61) 100/59     Intake/Output - Last 3 Shifts         05/15 0700  05/16 0659 05/16 0700 05/17 0659 05/17 0700 05/18 0659    P.O. 480 600     I.V. (mL/kg) 781.2 (13.2) 1155.6 (19.5)     IV Piggyback 646.2      Total  Intake(mL/kg) 1907.4 (32.1) 1755.6 (29.6)     Urine (mL/kg/hr) 1700 (1.2) 550 (0.4)     Emesis/NG output       Drains 15 10     Stool 525 500     Total Output 2240 1060     Net -332.6 +695.6            Stool Occurrence 0 x              Physical Exam  Vitals and nursing note reviewed.   Constitutional:       Appearance: She is well-developed.   HENT:      Head: Normocephalic.   Eyes:      Pupils: Pupils are equal, round, and reactive to light.   Cardiovascular:      Rate and Rhythm: Normal rate and regular rhythm.      Heart sounds: Normal heart sounds.   Pulmonary:      Effort: Pulmonary effort is normal. No respiratory distress.      Breath sounds: Normal breath sounds. No wheezing or rales.   Abdominal:      Palpations: Abdomen is soft. There is no mass.      Tenderness: There is no guarding or rebound.      Comments: Abd in mack healing well  Colostomy functional  Arturo fluid negative for creatinine   Cardona dced   Skin:     General: Skin is warm and dry.   Neurological:      Mental Status: She is alert and oriented to person, place, and time.   Psychiatric:         Behavior: Behavior normal.         Thought Content: Thought content normal.         Judgment: Judgment normal.           Significant Labs:  BMP (Last 3 Results):   Recent Labs   Lab 05/15/22  0815 05/15/22  1029 05/16/22  0607 05/16/22  0820 05/17/22  0017 05/17/22  0428 05/17/22  0952      < > 147*   < > 127* 96 119*   *   < > 127*   < > 134* 132* 132*   K 3.5   < > 4.2   < > 4.1 3.8 4.0   CL 88*   < > 91*   < > 99 98 98   CO2 29   < > 28   < > 27 25 27   BUN 14   < > 16   < > 13 13 11   CREATININE 0.7   < > 0.6   < > 0.6 0.5 0.6   CALCIUM 8.3*   < > 7.7*   < > 7.6* 8.1* 8.1*   MG 1.7  --  1.9  --   --  2.0  --     < > = values in this interval not displayed.     CBC (Last 3 Results):   Recent Labs   Lab 05/15/22  0636 05/16/22  0607 05/17/22  0428   WBC 6.39 7.52 5.92   RBC 4.08 3.93* 3.81*   HGB 11.3* 10.8* 10.5*   HCT 36.4* 33.2* 33.4*     264 234   MCV 89 85 88   MCH 27.7 27.5 27.6   MCHC 31.0* 32.5 31.4*       Significant Diagnostics:  None    Assessment/Plan:     Physical deconditioning  Cont PT/OT  Strongly enc pt to participate in care, becomes quite irate when providers suggest she attempt to walke and care for her ostomy     Hyponatremia  Appreciate medicine recs  IVF intermittent   Monitor labs  Monitor I&O    Alteration in skin integrity in adult  Alteration in skin on sacrum  No skin breakfown  Appreciate wound care recs  Specialty bed  Strongly enc pt to eat and ambulate       Acute cholecystitis  OR 5/9    Urinary tract infection with pyuria  Consult to ID for hx of UTI with VRE, continuing antibiotics until seen by ID  Appreciate ID input, continue antibiotics 5-7 days post op, to be finished today 5/16    Colovesical fistula  Repaired during surgery 5/9  Cardona in place  Abx per ID, transitioned to PO due to high volume with IV, completion date 5/16    Primary hypertension  Chronic, controlled.  Latest blood pressure and vitals reviewed-   Temp:  [96.2 °F (35.7 °C)-98 °F (36.7 °C)]   Pulse:  []   Resp:  [16-20]   BP: ()/(50-61)   SpO2:  [93 %-100 %] .   Home meds for hypertension were reviewed and noted below.   Hypertension Medications             carvediloL (COREG) 12.5 MG tablet Take 1 tablet (12.5 mg total) by mouth 2 (two) times daily with meals.    nitroGLYCERIN (NITROSTAT) 0.4 MG SL tablet Place 1 tablet (0.4 mg total) under the tongue every 5 (five) minutes as needed for Chest pain.    spironolactone (ALDACTONE) 25 MG tablet Take 1 tablet (25 mg total) by mouth once daily.          While in the hospital, will manage blood pressure as follows; Continue home antihypertensive regimen    Will utilize p.r.n. blood pressure medication only if patient's blood pressure greater than  180/110 and she develops symptoms such as worsening chest pain or shortness of breath.    Coronary artery disease of native artery of  native heart with stable angina pectoris  Patient with known CAD s/p stent placement and CABG, which is controlled   Tele  Resume home meds    Acute diverticulitis  OR 5/9 sigmoid colectomy, end colostomy, takedown of fistuli, and cholecystectomy    -await return of bowel function, lfd, not eating, no appetite  -continue multi modality for pain control  -encourage ambulation and use of IS  -phani hose and SCD  -prophalactic PPI  -home effient resumed  -quinones dced after negative creatitine from PRASAD    Automatic implantable cardioverter-defibrillator in situ  Cont tele  Cont home meds  Monitor vs          Carola Buckley NP  Colorectal Surgery  [facility]

## 2022-05-17 NOTE — PROGRESS NOTES
[facility]  Skin Integrity SIMRAN  Progress Note    Patient Name: Odette Hart  MRN: [id number]  Admission Date: 5/6/2022  Hospital Length of Stay: 8 days  Attending Physician: Rafa Cardozo MD  Primary Care Provider: Braxton Barragan MD         Subjective:     HPI:  Odette Hart is a 66 year old female with a PMHx of CAD s/p 2x HEVER placement in 1/2022 (on effient), CHF (EF 20%), ICD, and colovesicular fistula 2/2 complicated diverticulitis who presented to the ED with concerns for acute cholecystitis found on ultrasound. Patient was at [facility] and has been complaining of constant LUQ abdominal pain, poor PO intake, and N/V. She has been having abdominal pain, nausea/vomiting, and inability to tolerate PO since she was diagnosed with diverticulitis. They thought her symptoms were due to flagyl and it was discontinued with improvement, however her symptoms returned 10 days ago. She had a CT of her abdomen on 05/04 which showed improving diverticulitis, persistent colovesicular fistula and distended gallbladder with pericholecystic fluid. She was admitted to hospital medicine for management. Gen surg and CRS consulted. Pt is now s/p sigmoid colectomy, cholecystectomy, colovagino/vesico fistula resection, washout, and appendectomy with colostomy placement on 5/9. Wound care consulted for evaluation of skin injury.          Hospital Course:   Interval history: Pt seen sitting in chair. C/o tenderness to sacrum. Chair cushion being used.       Follow-up For: Procedure(s) (LRB):  COLECTOMY, SIGMOID (ERAS High, lithotomy) (N/A)  COLONOSCOPY (N/A)  CYSTOSCOPY,WITH URETERAL CATHETER INSERTION (Bilateral)  CHOLECYSTECTOMY (N/A)  APPENDECTOMY (N/A)  EXCISION, CECUM (N/A)  CREATION, COLOSTOMY    Post-Operative Day: 8 Days Post-Op    Scheduled Meds:   acetaminophen  1,000 mg Oral Q8H    aspirin  81 mg Oral Daily    balsam peru-castor oiL   Topical (Top) BID    carvediloL  12.5 mg Oral BID WM    ezetimibe  10  mg Oral QHS    pantoprazole  40 mg Oral Daily    polyethylene glycol  17 g Oral Daily    prasugreL  10 mg Oral Daily    scopolamine  1 patch Transdermal Q3 Days     Continuous Infusions:  PRN Meds:albuterol-ipratropium, ALPRAZolam, aluminum-magnesium hydroxide-simethicone, dextrose 10%, dextrose 10%, glucagon (human recombinant), glucose, glucose, insulin aspart U-100, oxyCODONE, prochlorperazine, sodium chloride 0.9%, sodium chloride 0.9%    Review of Systems   Skin:  Positive for wound.   Objective:     Vital Signs (Most Recent):  Temp: 96.6 °F (35.9 °C) (05/17/22 0714)  Pulse: 75 (05/17/22 0714)  Resp: 17 (05/17/22 0714)  BP: (!) 100/59 (05/17/22 0714)  SpO2: 100 % (05/17/22 0714)   Vital Signs (24h Range):  Temp:  [96.2 °F (35.7 °C)-98 °F (36.7 °C)] 96.6 °F (35.9 °C)  Pulse:  [] 75  Resp:  [16-18] 17  SpO2:  [93 %-100 %] 100 %  BP: ()/(50-59) 100/59     Weight: 59.4 kg (130 lb 15.3 oz)  Body mass index is 23.2 kg/m².    Physical Exam  Constitutional:       Appearance: Normal appearance.   Skin:     General: Skin is warm and dry.      Findings: Lesion present.   Neurological:      Mental Status: She is alert.       Laboratory:  All pertinent labs reviewed within the last 24 hours.    Diagnostic Results:  None      Assessment/Plan:          SIMRAN Skin Integrity Evaluation    Skin Integrity SIMRAN evaluation of patient as part of the comprehensive skin care team.   She has been admitted for 11 days. Skin injury was noted on 5/11/22. POA no.    Sacrum            Alteration in skin integrity in adult  - follow up evaluation of sacral injury.  - pt transferred from [facility] with LUQ abdominal pain concerning for acute cholecystitis.  - pt underwent cholecystectomy and colostomy placement on 5/9.  - pt seen sitting up to chair on assessment c/o tenderness to sacral area.  - chair cushion in place.  - sacrum with small area of dark discoloration to periwound and deep purple center. Skin intact, no breakdown  noted.  - foam dressing in place. Pt also with indwelling catheter.  - continue BPCO (Venelex) bid/prn.  - currently on [place] surface with waffle overlay.  - specialty bed ordered 5/13. Will follow up on eta.  - nursing to maintain pressure injury prevention measures.  - nursing to continue scheduled wound care per orders.         Will follow up with patient weekly.      Callie Hart NP  Skin Integrity SIMRAN  [facility]

## 2022-05-17 NOTE — ASSESSMENT & PLAN NOTE
Acute diverticulitis  Colovesical fistula  · LUQ, N/V for past few months, worsened over the last few days   · US with cholelithiasis, gallbladder wall thickening and pericholecystic fluid.  No sonographic Rasmussen's sign or wall hyperemia.    · HIDA ordered to determine if her cystic duct in patent  · S/p cholecystectomy with general surgery and ex-lap with ostomy creation with CRS on 5/9  · Finish 5/16 cefepime and flagyl x 7 days post op

## 2022-05-17 NOTE — ASSESSMENT & PLAN NOTE
- urine studies and exam consistent with hypovolemic hyponatremia  - giving NS and trending BMPs  - Na improving 131 > 134 > 132. Continue gentle fluids through today. Encouraged oral.

## 2022-05-17 NOTE — CONSULTS
[facility]  Psychology  Consult Note    Patient Name: Odette Hart  MRN: [id number]   Patient Class: IP- Inpatient  Admission Date: 5/6/2022  Hospital Length of Stay: 8 days  Attending Physician: Rafa Cardozo MD  Primary Care Provider: Braxton Barragan MD    Consults  History of Present Illness: Odette Hart is a 66 y.o. female with a PMHx of CAD s/p 2x HEVER placement in 1/2022 (on effient), CHF (EF 20%), ICD, and colovesicular fistula 2/2 complicated diverticulitis who presented to the ED with concerns for acute cholecystitis found on ultrasound    Symptoms  · Mood: Depressed mood, anhedonia, passive suicidal ideation. Patient reported that she occasionally wishes for death, and that she views death as an escape from physical suffering. She denied any suicidal plans or intent. Also noted regular periods of irritability.  · Anxiety: Mild anxiety related to worries about health, recovery, functional impairments.   · Pain: Rated current pain as 7/10. Denied concerns regarding pain management. Complained of dry mouth.  · Appetite: Decreased.  · Sleep: Mild-moderate impairment involving delayed onset and regular nighttime awakenings.    Psychiatric History: Reported a history of mild anxiety and depression starting in 2005 after Lana. Noted that she occasionally takes Xanax for difficulties with sleep initiation. No history of self-harm behavior or suicide attempt.    Past Medical History:   Diagnosis Date    Acute coronary syndrome     Allergy     Arthritis     Cardiomyopathy     CHF (congestive heart failure)     Coronary artery disease     Coronary artery disease of native artery of native heart with stable angina pectoris 4/19/2021 1/24/2022: [facility]: Cath: LAD: Proximal stent patent. LCX: Proximal 80%. LCX: Dominant. Moderate disease. LCX: HEVER 2.75 x 18 mm. Distal dissection. HEVER 2.75 x 22 mm.     Diverticulitis     Diverticulosis     Familial hypercholesterolemia 1/22/2022     Former smoker 1/24/2022    Heart attack     Heart disease     History of myocardial infarction 1/24/2022    Hyperlipidemia     Hypertension     Hypertension 1/24/2022    ICD (implantable cardioverter-defibrillator) in place     Non-ST elevation myocardial infarction (NSTEMI) 2/4/2019     Psychotherapeutics (From admission, onward)            Start     Stop Route Frequency Ordered    05/07/22 0120  ALPRAZolam tablet 0.5 mg         -- Oral Nightly PRN 05/07/22 0121        Intervention: Engaged patient in brief Motivational Interviewing focused on initiation of counseling/psychotherapy. Elicited description of health values and recovery goals. Provided overview of benefits of counseling/psychotherapy.    Mental Status Exam  General Appearance:  unremarkable, age appropriate, good grooming and hygiene   Speech: Normal rate, volume, and prosody      Level of Cooperation: cooperative      Thought Processes: normal and logical   Mood: dysphoric      Thought Content: normal, no suicidality, no homicidality, delusions, or paranoia   Affect: congruent and appropriate   Orientation: Oriented x 4   Memory: No deficits noted.   Attention & Concentration: intact   Fund of General Knowledge: intact and appropriate to age and level of education   Abstract Reasoning: No deficits noted.   Judgment & Insight: good   Language: intact   Behavioral Observations: Seated in chair. Good eye contact. No signs of psychomotor agitation or retardation.     Diagnostic Impression - Plan:     Active Diagnoses:    Diagnosis Date Noted POA    PRINCIPAL PROBLEM:  Calculus of gallbladder with cholecystitis without biliary obstruction [K80.10] 05/06/2022 Yes    Physical deconditioning [R53.81] 05/16/2022 Yes    Hyponatremia [E87.1] 05/15/2022 No    Prolonged Q-T interval on ECG [R94.31] 05/14/2022 Yes    Mood disorder [F39] 05/13/2022 Yes    Alteration in skin integrity in adult [R23.9] 05/13/2022 No    Dizziness [R42] 05/12/2022 Yes     Acute decompensated heart failure [I50.9] 05/10/2022 Unknown    Anemia [D64.9] 05/07/2022 Yes    Acute cholecystitis [K81.0] 05/06/2022 Yes    Colovesical fistula [N32.1] 02/26/2022 Yes    Urinary tract infection with pyuria [N39.0] 02/26/2022 Yes    Primary hypertension [I10] 01/24/2022 Yes    Familial hypercholesterolemia [E78.01] 01/22/2022 Yes    Coronary artery disease of native artery of native heart with stable angina pectoris [I25.118] 04/19/2021 Yes    Acute diverticulitis [K57.92] 01/12/2021 Yes    Automatic implantable cardioverter-defibrillator in situ [Z95.810] 02/04/2019 Yes      Problems Resolved During this Admission:     Impression: Odette Hart is a 66 y.o. 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 Psychology consulted to assess depression and anxiety. Patient endorsed mild-moderate anxiety and depressive symptoms. Patient has a history of depression. Current medical stress has exacerbated baseline symptoms. Patient meets criteria for Major Depressive Disorder, Recurrent, In partial remission. Depressive symptoms may be interfering with self-care/health behaviors.    Plan: Psychology will continue to follow.    Recommendations: Follow up with outpatient Psychology or Counseling. Provided Psychiatry Dept. Contact info and encouraged pt to schedule.      Length of Service: 22 minutes    Alan Long, PhD  Psychology  [facility]

## 2022-05-18 PROBLEM — N39.0 URINARY TRACT INFECTION WITH PYURIA: Chronic | Status: ACTIVE | Noted: 2022-02-26

## 2022-05-18 NOTE — ASSESSMENT & PLAN NOTE
Repaired during surgery 5/9  Abx per ID, transitioned to PO due to high volume with IV, completion date 5/16

## 2022-05-18 NOTE — PLAN OF CARE
05/18/22 1112   Post-Acute Status   Post-Acute Authorization Placement   Post-Acute Placement Status Referrals Sent   SW met with pt at the bedside regarding dispo. SW brought list of facilities in network and pt reviewed it. She stated she would like [facility] and [facility]. SW stated [facility] does not have any beds until next week.   SW sent referrals in [facility].  SW completed LOCET and faxed PASRR to state. Waiting on 142.      Cristina Kendall LMSW  [facility]  [id number]

## 2022-05-18 NOTE — PLAN OF CARE
Patient more willing to participate in activities today, patient up in chair most of day, pt ate around 25-50% of meals today which is a big improvement from yesterday. TB kin test done, no noticeable reaction seen hours after administering same.   Patient alert and oriented, RA with occasional oxygen needed. Patient up to BSC.         Problem: Adult Inpatient Plan of Care  Goal: Plan of Care Review  Outcome: Ongoing, Progressing  Flowsheets (Taken 5/18/2022 1436)  Plan of Care Reviewed With: patient  Goal: Absence of Hospital-Acquired Illness or Injury  Outcome: Ongoing, Progressing  Intervention: Identify and Manage Fall Risk  Flowsheets (Taken 5/18/2022 1436)  Safety Promotion/Fall Prevention:   assistive device/personal item within reach   Fall Risk reviewed with patient/family   Fall Risk signage in place   chair alarm set   in recliner, wheels locked   lighting adjusted   medications reviewed   side rails raised x 2   instructed to call staff for mobility   nonskid shoes/socks when out of bed  Intervention: Prevent Skin Injury  Flowsheets (Taken 5/18/2022 1436)  Body Position: position changed independently  Skin Protection:   adhesive use limited   incontinence pads utilized  Intervention: Prevent and Manage VTE (Venous Thromboembolism) Risk  Flowsheets (Taken 5/18/2022 1436)  Activity Management:   Arm raise - L1   Rolling - L1   Ambulated in room - L4   Up in chair - L3  VTE Prevention/Management:   remove, assess skin, and reapply sequential compression device   ambulation promoted   dorsiflexion/plantar flexion performed  Range of Motion: active ROM (range of motion) encouraged  Intervention: Prevent Infection  Flowsheets (Taken 5/18/2022 1436)  Infection Prevention:   environmental surveillance performed   equipment surfaces disinfected   hand hygiene promoted   personal protective equipment utilized   rest/sleep promoted   single patient room provided  Goal: Optimal Comfort and  Wellbeing  Outcome: Ongoing, Progressing  Intervention: Monitor Pain and Promote Comfort  Flowsheets (Taken 5/18/2022 1436)  Pain Management Interventions:   awakened for pain meds per patient request   care clustered  Intervention: Provide Person-Centered Care  Flowsheets (Taken 5/18/2022 1436)  Trust Relationship/Rapport:   care explained   choices provided   emotional support provided   empathic listening provided     Problem: Infection  Goal: Absence of Infection Signs and Symptoms  Outcome: Ongoing, Progressing  Intervention: Prevent or Manage Infection  Flowsheets (Taken 5/18/2022 1436)  Fever Reduction/Comfort Measures:   lightweight clothing   lightweight bedding  Infection Management: aseptic technique maintained  Isolation Precautions: precautions maintained     Problem: Fall Injury Risk  Goal: Absence of Fall and Fall-Related Injury  Outcome: Ongoing, Progressing  Intervention: Identify and Manage Contributors  Flowsheets (Taken 5/18/2022 1436)  Self-Care Promotion:   independence encouraged   BADL personal objects within reach  Medication Review/Management: medications reviewed  Intervention: Promote Injury-Free Environment  Flowsheets (Taken 5/18/2022 1436)  Safety Promotion/Fall Prevention:   assistive device/personal item within reach   Fall Risk reviewed with patient/family   Fall Risk signage in place   chair alarm set   in recliner, wheels locked   lighting adjusted   medications reviewed   side rails raised x 2   instructed to call staff for mobility   nonskid shoes/socks when out of bed     Problem: Impaired Wound Healing  Goal: Optimal Wound Healing  Outcome: Ongoing, Progressing  Intervention: Promote Wound Healing  Flowsheets (Taken 5/18/2022 1436)  Activity Management:   Arm raise - L1   Rolling - L1   Ambulated in room - L4   Up in chair - L3  Pain Management Interventions:   awakened for pain meds per patient request   care clustered

## 2022-05-18 NOTE — PROGRESS NOTES
[facility] Medicine  Progress Note    Patient Name: dOette Hart  MRN: [id number]  Patient Class: IP- Inpatient   Admission Date: 5/6/2022  Length of Stay: 9 days  Attending Physician: Rafa Cardozo MD  Primary Care Provider: Braxton Barragan MD        Subjective:     Principal Problem:Calculus of gallbladder with cholecystitis without biliary obstruction        HPI:  Odette Hart is a 66 y.o. 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 Patient was at [facility] and has been complaining of constant LUQ abdominal pain, poor PO intake, and N/V. She has been having abdominal pain, nausea/vomiting, and inability to tolerate PO since she was diagnosed with diverticulitis. They thought her symptoms were due to flagyl and it was discontinued with improvement, however her symptoms returned 10 days ago. She had a CT of her abdomen on 05/04 which showed improving diverticulitis, persistent colovesicular fistula and distended gallbladder with pericholecystic fluid. She denies any fevers, chills, chest pain, dysuria.    General surgery consulted in ED and recommend HIDA.        Overview/Hospital Course:  Ms. Hart was admitted to Hospital Medicine for management of acute cholecystitis.  Gen Surg was consulted and HIDA was ordered.  CRS was consulted who plan for corrective surgery of her colovesical fistula with ostomy placement on 5/9 with combined cholecystectomy. Transferred to CRS service,  consulted for continued care in setting of ADHF. Started on diuresis with lasix 40mg IV BID, symptoms and oxygen requirements now much improved. Repeat echo unchanged with IVC of 3cm so lasix stopped. Now with decreased UOP and downtrending sodium. Will given gentle IVFs. Encouraged oral. Na improving. Psychology consulted for  depression and anxiety. Poor motivation to engage in care. Still with SOB and dizziness on exertion. VQ scan to r/o PE.          Interval History: Na improving 132 > 133. Orals encouraged. Still dizzy and SOB on exertion 2/2 deconditioning vs PE vs hypotension vs hypovolemia. Perfusion scan indeterminate for PE. Full VQ scan today. BLE US negative for DVT. Decreased Coreg to 6.25 mg BID. Consider transition to Toprol if hypotension continues.    Review of Systems   Constitutional:  Negative for chills, fatigue and fever.   HENT:  Negative for congestion and rhinorrhea.    Eyes:  Negative for visual disturbance.   Respiratory:  Positive for shortness of breath. Negative for cough.    Cardiovascular:  Negative for chest pain, palpitations and leg swelling.   Gastrointestinal:  Positive for constipation and nausea. Negative for abdominal pain, diarrhea and vomiting.   Genitourinary:  Negative for dysuria and urgency.   Musculoskeletal:  Negative for back pain.   Skin:  Negative for rash.   Neurological:  Positive for light-headedness. Negative for dizziness, syncope, weakness and headaches.   Psychiatric/Behavioral:  Negative for agitation and confusion.    All other systems reviewed and are negative.  Objective:     Vital Signs (Most Recent):  Temp: 97 °F (36.1 °C) (05/18/22 0717)  Pulse: 77 (05/18/22 1100)  Resp: 17 (05/18/22 0717)  BP: (!) 98/55 (05/18/22 0717)  SpO2: 100 % (05/18/22 0717)   Vital Signs (24h Range):  Temp:  [97 °F (36.1 °C)-98 °F (36.7 °C)] 97 °F (36.1 °C)  Pulse:  [60-89] 77  Resp:  [16-20] 17  SpO2:  [96 %-100 %] 100 %  BP: ()/(55-64) 98/55     Weight: 59.4 kg (130 lb 15.3 oz)  Body mass index is 23.2 kg/m².    Intake/Output Summary (Last 24 hours) at 5/18/2022 1120  Last data filed at 5/18/2022 0515  Gross per 24 hour   Intake 340 ml   Output 555 ml   Net -215 ml        Physical Exam  Constitutional:       Appearance: Normal appearance. She is well-developed.   HENT:      Head:  Normocephalic and atraumatic.      Mouth/Throat:      Mouth: Mucous membranes are moist.   Eyes:      Conjunctiva/sclera: Conjunctivae normal.   Neck:      Comments: No JVD  Cardiovascular:      Rate and Rhythm: Normal rate and regular rhythm.      Heart sounds: No murmur heard.  Pulmonary:      Effort: Pulmonary effort is normal. No respiratory distress.      Breath sounds: Normal breath sounds. No wheezing or rales.      Comments: On RA  Abdominal:      General: There is no distension.      Palpations: Abdomen is soft.      Tenderness: There is abdominal tenderness (improving daily).      Comments: Drain with serosanguinous ouput. Colostomy bag with minimal stool and gas, incision site c/d/i   Musculoskeletal:      Right lower leg: No edema.      Left lower leg: No edema.      Comments: PICC in RUE   Skin:     General: Skin is warm.   Neurological:      General: No focal deficit present.      Mental Status: She is alert and oriented to person, place, and time.      Comments: Appears more tired today, muttered speech this afternoon        Significant Labs: All pertinent labs within the past 24 hours have been reviewed.  CBC:   Recent Labs   Lab 05/17/22 0428 05/18/22  0429   WBC 5.92 4.44   HGB 10.5* 9.8*   HCT 33.4* 30.9*    202       CMP:   Recent Labs   Lab 05/17/22  0428 05/17/22  0952 05/17/22  1603 05/18/22  0429 05/18/22  0800   *   < > 132* 133*  133* 134*   K 3.8   < > 4.5 4.3  4.3 4.7   CL 98   < > 98 100  100 100   CO2 25   < > 27 24  26 27   GLU 96   < > 113* 100  99 113*   BUN 13   < > 11 12  12 12   CREATININE 0.5   < > 0.6 0.5  0.6 0.6   CALCIUM 8.1*   < > 8.1* 8.3*  8.3* 8.3*   PROT 6.4  --   --  6.0  --    ALBUMIN 1.7*  --   --  1.7*  --    BILITOT 0.7  --   --  0.7  --    ALKPHOS 96  --   --  81  --    AST 22  --   --  19  --    ALT 11  --   --  12  --    ANIONGAP 9   < > 7* 9  7* 7*   EGFRNONAA >60.0   < > >60.0 >60.0  >60.0 >60.0    < > = values in this interval not  displayed.         Significant Imaging: I have reviewed all pertinent imaging results/findings within the past 24 hours.      Assessment/Plan:      * Calculus of gallbladder with cholecystitis without biliary obstruction  Acute diverticulitis  Colovesical fistula  · LUQ, N/V for past few months, worsened over the last few days   · US with cholelithiasis, gallbladder wall thickening and pericholecystic fluid.  No sonographic Rasmussen's sign or wall hyperemia.    · HIDA ordered to determine if her cystic duct in patent  · S/p cholecystectomy with general surgery and ex-lap with ostomy creation with CRS on 5/9  · Finish 5/16 cefepime and flagyl x 7 days post op    Physical deconditioning  -- PT /OT    Hyponatremia  - urine studies and exam consistent with hypovolemic hyponatremia  - Improved with IV fluids  - Na improving 132 > 133. Orals encouraged.    Prolonged Q-T interval on ECG  QTc 581 on admit  - Switched antiemetics to compazine  - monitor electrolytes   - avoid other QT prolonging meds    Alteration in skin integrity in adult  - sacral wound, wound care following       Mood disorder  - patient exhibiting signs of depression and anxiety. Poorly motivated. Not engaging much in care  - Appreciate psychology    Dizziness  - reports dizziness on exertion/standing. Possibly related to BP or multifactorial given acute illness, pain medication, and hypovolemia. Hgb stable   - may benefit from dose of meclizine if continues and QT improved  - Continued dizziness and SOB on exertion 2/2 deconditioning vs PE vs hypotension vs hypovolemia.   - Perfusion scan 5/17 indeterminate for PE. Full VQ scan today. BLE US negative for DVT. If PE present, will need discussion +/- AC as she is on DAPT and recent intra-abdominal surgery.  - Decreased Coreg to 6.25 mg BID. Consider transition to Toprol if hypotension continues.  - Orals encouraged      Acute decompensated heart failure  · Combined HF with EF 20%  · Radiographic evidence  of pulmonary edema  · BNP ~ 3000  · Continue Coreg 12.5mg BID  · Holding further diuresis for now  · Low sodium diet with 1.5 L fluid restriction  · Strict I&Os, daily weights    - BNP remains elevated but repeat TTE with small IVC, volume overload resolved    Results for orders placed during the hospital encounter of 05/06/22    Echo    Interpretation Summary  · The left ventricle is severely enlarged with eccentric hypertrophy and severely decreased systolic function. The estimated ejection fraction is 20%.  · Mild right ventricular enlargement with normal right ventricular systolic function.  · Grade II left ventricular diastolic dysfunction.  · Severe left atrial enlargement.  · Mild-to-moderate mitral regurgitation.  · Mild tricuspid regurgitation.  · The estimated PA systolic pressure is 35 mmHg.  · Normal central venous pressure (3 mmHg).        Anemia  · Stable.   · Blood smear notable for Rouleaux formation      Acute cholecystitis  · As above      Urinary tract infection with pyuria  · In April - Pseudomonas / Enterobacteroid acute cystitis without hematuria  · Urine cx positive for MDR VRE. ID following, continue  linezolid x 7 days (end date 5/17)  · Finish fluconazole for yeast 5/16    Colovesical fistula  · Continue Cefepime and Flagyl  S/p cholecystectomy with general surgery and ex-lap with ostomy creation with CRS on     Primary hypertension  · Chronic and stable  · Decrease Coreg to 6.25 mg BID as she is hypotensive    Familial hypercholesterolemia  Continue Zetia  Might need Niacin    Coronary artery disease of native artery of native heart with stable angina pectoris  · Chronic and stable  · Continue Aspirin 81mg PO daily  · Continue Coreg 12.5mg PO BID  · Prasugrel restarted 5/14    Acute diverticulitis  · Chronic issue  · Has been on TPN  · CRS following    Automatic implantable cardioverter-defibrillator in situ  · Chronic and stable  · No acute issues      VTE Risk Mitigation (From admission,  onward)         Ordered     IP VTE HIGH RISK PATIENT  Once         05/06/22 2358     Place sequential compression device  Until discontinued         05/06/22 6435                  Analy Garrison DO  Department of Hospital Medicine   [facility]

## 2022-05-18 NOTE — SUBJECTIVE & OBJECTIVE
Interval History: Na improving 132 > 133. Orals encouraged. Still dizzy and SOB on exertion 2/2 deconditioning vs PE vs hypotension vs hypovolemia. Perfusion scan indeterminate for PE. Full VQ scan today. BLE US negative for DVT. Decreased Coreg to 6.25 mg BID. Consider transition to Toprol if hypotension continues.    Review of Systems   Constitutional:  Negative for chills, fatigue and fever.   HENT:  Negative for congestion and rhinorrhea.    Eyes:  Negative for visual disturbance.   Respiratory:  Positive for shortness of breath. Negative for cough.    Cardiovascular:  Negative for chest pain, palpitations and leg swelling.   Gastrointestinal:  Positive for constipation and nausea. Negative for abdominal pain, diarrhea and vomiting.   Genitourinary:  Negative for dysuria and urgency.   Musculoskeletal:  Negative for back pain.   Skin:  Negative for rash.   Neurological:  Positive for light-headedness. Negative for dizziness, syncope, weakness and headaches.   Psychiatric/Behavioral:  Negative for agitation and confusion.    All other systems reviewed and are negative.  Objective:     Vital Signs (Most Recent):  Temp: 97 °F (36.1 °C) (05/18/22 0717)  Pulse: 77 (05/18/22 1100)  Resp: 17 (05/18/22 0717)  BP: (!) 98/55 (05/18/22 0717)  SpO2: 100 % (05/18/22 0717)   Vital Signs (24h Range):  Temp:  [97 °F (36.1 °C)-98 °F (36.7 °C)] 97 °F (36.1 °C)  Pulse:  [60-89] 77  Resp:  [16-20] 17  SpO2:  [96 %-100 %] 100 %  BP: ()/(55-64) 98/55     Weight: 59.4 kg (130 lb 15.3 oz)  Body mass index is 23.2 kg/m².    Intake/Output Summary (Last 24 hours) at 5/18/2022 1120  Last data filed at 5/18/2022 0515  Gross per 24 hour   Intake 340 ml   Output 555 ml   Net -215 ml        Physical Exam  Constitutional:       Appearance: Normal appearance. She is well-developed.   HENT:      Head: Normocephalic and atraumatic.      Mouth/Throat:      Mouth: Mucous membranes are moist.   Eyes:      Conjunctiva/sclera: Conjunctivae  normal.   Neck:      Comments: No JVD  Cardiovascular:      Rate and Rhythm: Normal rate and regular rhythm.      Heart sounds: No murmur heard.  Pulmonary:      Effort: Pulmonary effort is normal. No respiratory distress.      Breath sounds: Normal breath sounds. No wheezing or rales.      Comments: On RA  Abdominal:      General: There is no distension.      Palpations: Abdomen is soft.      Tenderness: There is abdominal tenderness (improving daily).      Comments: Drain with serosanguinous ouput. Colostomy bag with minimal stool and gas, incision site c/d/i   Musculoskeletal:      Right lower leg: No edema.      Left lower leg: No edema.      Comments: PICC in RUE   Skin:     General: Skin is warm.   Neurological:      General: No focal deficit present.      Mental Status: She is alert and oriented to person, place, and time.      Comments: Appears more tired today, muttered speech this afternoon        Significant Labs: All pertinent labs within the past 24 hours have been reviewed.  CBC:   Recent Labs   Lab 05/17/22  0428 05/18/22  0429   WBC 5.92 4.44   HGB 10.5* 9.8*   HCT 33.4* 30.9*    202       CMP:   Recent Labs   Lab 05/17/22  0428 05/17/22  0952 05/17/22  1603 05/18/22  0429 05/18/22  0800   *   < > 132* 133*  133* 134*   K 3.8   < > 4.5 4.3  4.3 4.7   CL 98   < > 98 100  100 100   CO2 25   < > 27 24  26 27   GLU 96   < > 113* 100  99 113*   BUN 13   < > 11 12  12 12   CREATININE 0.5   < > 0.6 0.5  0.6 0.6   CALCIUM 8.1*   < > 8.1* 8.3*  8.3* 8.3*   PROT 6.4  --   --  6.0  --    ALBUMIN 1.7*  --   --  1.7*  --    BILITOT 0.7  --   --  0.7  --    ALKPHOS 96  --   --  81  --    AST 22  --   --  19  --    ALT 11  --   --  12  --    ANIONGAP 9   < > 7* 9  7* 7*   EGFRNONAA >60.0   < > >60.0 >60.0  >60.0 >60.0    < > = values in this interval not displayed.         Significant Imaging: I have reviewed all pertinent imaging results/findings within the past 24 hours.

## 2022-05-18 NOTE — PLAN OF CARE
Problem: Occupational Therapy  Goal: Occupational Therapy Goal  Description: Goals to be met by: 5/24/22     Patient will increase functional independence with ADLs by performing:    Feeding with [place].  UE Dressing with [place].  LE Dressing with Stand-by Assistance.  Grooming while standing at sink with Supervision.  Toileting from toilet with Supervision for hygiene and clothing management.   Toilet transfer to toilet with Supervision.  Pt will engage in functional mobility to simulate household distances in order to maximize functional activity tolerance required for engagement in occupations of choice with supervision using LRAD.     Goals remain appropriate. Continue OT as tolerated.  Pita Hart OT  5/18/2022    Outcome: Ongoing, Progressing

## 2022-05-18 NOTE — PT/OT/SLP PROGRESS
Physical Therapy  Continue Current Plan of Care     Patient Name:  Odette Hart   MRN:  [id number]  Admitting Diagnosis:  Calculus of gallbladder with cholecystitis without biliary obstruction   Recent Surgery: Procedure(s) (LRB):  COLECTOMY, SIGMOID (ERAS High, lithotomy) (N/A)  COLONOSCOPY (N/A)  CYSTOSCOPY,WITH URETERAL CATHETER INSERTION (Bilateral)  CHOLECYSTECTOMY (N/A)  APPENDECTOMY (N/A)  EXCISION, CECUM (N/A)  CREATION, COLOSTOMY 9 Days Post-Op  Admit Date: 5/6/2022  Length of Stay: 9 days    Patient not seen on this date, however per chart review pt continues to benefit from acute PT services. PT to follow up as POC allows. Please continue progressive mobility as appropriate.    Discharge Disposition Recommendation: DESEAN Kearney PTA  5/18/2022  Pager: [phone]

## 2022-05-18 NOTE — SUBJECTIVE & OBJECTIVE
Subjective:     Interval History: no acute events overnite, did particpate with ostomy lesson, colostomy functional, seems depressed, psy seeing    Post-Op Info:  Procedure(s) (LRB):  COLECTOMY, SIGMOID (ERAS High, lithotomy) (N/A)  COLONOSCOPY (N/A)  CYSTOSCOPY,WITH URETERAL CATHETER INSERTION (Bilateral)  CHOLECYSTECTOMY (N/A)  APPENDECTOMY (N/A)  EXCISION, CECUM (N/A)  CREATION, COLOSTOMY   9 Days Post-Op      Medications:  Continuous Infusions:  Scheduled Meds:   acetaminophen  1,000 mg Oral Q8H    aspirin  81 mg Oral Daily    balsam peru-castor oiL   Topical (Top) BID    carvediloL  6.25 mg Oral BID WM    ezetimibe  10 mg Oral QHS    pantoprazole  40 mg Oral Daily    polyethylene glycol  17 g Oral Daily    prasugreL  10 mg Oral Daily    scopolamine  1 patch Transdermal Q3 Days     PRN Meds:   albuterol-ipratropium    ALPRAZolam    aluminum-magnesium hydroxide-simethicone    dextrose 10%    dextrose 10%    glucagon (human recombinant)    glucose    glucose    insulin aspart U-100    oxyCODONE    prochlorperazine    sodium chloride 0.9%    sodium chloride 0.9%        Objective:     Vital Signs (Most Recent):  Temp: 97 °F (36.1 °C) (05/18/22 0717)  Pulse: 60 (05/18/22 0730)  Resp: 17 (05/18/22 0717)  BP: (!) 98/55 (05/18/22 0717)  SpO2: 100 % (05/18/22 0717)   Vital Signs (24h Range):  Temp:  [97 °F (36.1 °C)-98 °F (36.7 °C)] 97 °F (36.1 °C)  Pulse:  [60-89] 60  Resp:  [16-20] 17  SpO2:  [96 %-100 %] 100 %  BP: ()/(55-64) 98/55     Intake/Output - Last 3 Shifts         05/16 0700  05/17 0659 05/17 0700  05/18 0659 05/18 0700 05/19 0659    P.O. 600 240     I.V. (mL/kg) 1155.6 (19.5)      Other  100     IV Piggyback       Total Intake(mL/kg) 1755.6 (29.6) 340 (5.7)     Urine (mL/kg/hr) 550 (0.4) 500 (0.4)     Drains 10 55     Stool 500 0     Total Output 1060 555     Net +695.6 -215            Stool Occurrence  1 x             Physical Exam  Vitals and nursing note reviewed.   Constitutional:        Appearance: She is well-developed.   HENT:      Head: Normocephalic.   Eyes:      Pupils: Pupils are equal, round, and reactive to light.   Cardiovascular:      Rate and Rhythm: Normal rate and regular rhythm.      Heart sounds: Normal heart sounds.   Pulmonary:      Effort: Pulmonary effort is normal. No respiratory distress.      Breath sounds: Normal breath sounds. No wheezing or rales.   Abdominal:      Palpations: Abdomen is soft. There is no mass.      Tenderness: There is no guarding or rebound.      Comments: Abd inc line healing well  PRASAD serous drainage  Colostomy flatus    Skin:     General: Skin is warm and dry.      Comments: Skin injury to sacrum    Neurological:      Mental Status: She is alert and oriented to person, place, and time.   Psychiatric:         Behavior: Behavior normal.         Thought Content: Thought content normal.         Judgment: Judgment normal.           Significant Labs:  BMP (Last 3 Results):   Recent Labs   Lab 05/16/22  0607 05/16/22  0820 05/17/22  0428 05/17/22  0952 05/17/22  1603 05/18/22  0429 05/18/22  0800   *   < > 96   < > 113* 100  99 113*   *   < > 132*   < > 132* 133*  133* 134*   K 4.2   < > 3.8   < > 4.5 4.3  4.3 4.7   CL 91*   < > 98   < > 98 100  100 100   CO2 28   < > 25   < > 27 24  26 27   BUN 16   < > 13   < > 11 12  12 12   CREATININE 0.6   < > 0.5   < > 0.6 0.5  0.6 0.6   CALCIUM 7.7*   < > 8.1*   < > 8.1* 8.3*  8.3* 8.3*   MG 1.9  --  2.0  --   --  1.8  --     < > = values in this interval not displayed.     CBC (Last 3 Results):   Recent Labs   Lab 05/16/22  0607 05/17/22 0428 05/18/22 0429   WBC 7.52 5.92 4.44   RBC 3.93* 3.81* 3.52*   HGB 10.8* 10.5* 9.8*   HCT 33.2* 33.4* 30.9*    234 202   MCV 85 88 88   MCH 27.5 27.6 27.8   MCHC 32.5 31.4* 31.7*       Seignificant Diagnostics:  VQ scan quesetionable PE, US legs negative for dvts, for nuclear lung scan today

## 2022-05-18 NOTE — PT/OT/SLP PROGRESS
Occupational Therapy   Treatment    Name: Odette Hart  MRN: [id number]  Admitting Diagnosis:  Calculus of gallbladder with cholecystitis without biliary obstruction  9 Days Post-Op    Recommendations:     Discharge Recommendations: nursing facility, skilled  Discharge Equipment Recommendations:  bedside commode, walker, rolling  Barriers to discharge:  Decreased caregiver support    Assessment:     Odette Hart is a 66 y.o. female with a medical diagnosis of Calculus of gallbladder with cholecystitis without biliary obstruction.  Pt presents with decreased endurance and impaired mobility performance as limited by cardiovascular status and generalized weakness. Pt found upright in bed and agreeable for therapy with encouragement. Session focused on bedroom mobility today however pt limited 2/2 increased LI and chest pain reported during segments of ambulation. Pt stood from chair x3 today with min-mod A using RW. Pt tolerated <1 minute each trial 2/2 voiced discomfort with pt oftentimes placing self back into chair. Pt was provided gentle therapeutic listening and positive reinforcement of pt's progress as pt appears to have anhedonia and is frustrated given current debility. Pt was left upright in chair with IM MD present.     At this time, pt is a high fall risk and not safe to return home. Pt would benefit from continued OT skilled services 3x/wk to improve daily living skills to optimize QOL.  Pt is recommended to discharge to SNF at this time.   Performance deficits affecting function are weakness, impaired functional mobilty, impaired endurance, gait instability, impaired self care skills, decreased lower extremity function, decreased upper extremity function, decreased coordination, impaired cardiopulmonary response to activity, pain.     Rehab Prognosis:  Good; patient would benefit from acute skilled OT services to address these deficits and reach maximum level of function.       Plan:      Patient to be seen 3 x/week to address the above listed problems via self-care/home management, therapeutic activities, therapeutic exercises  · Plan of Care Expires: 06/09/22  · Plan of Care Reviewed with: patient    Subjective     Pain/Comfort:  · Pain Rating 1: 0/10  · Pain Rating Post-Intervention 1: other (see comments) (chest pain/LI)    Objective:     Communicated with: RN prior to session.  Patient found up in chair with colostomy, PRASAD drain, oxygen, telemetry, Other (comments) (avys camera) upon OT entry to room.    General Precautions: Standard, fall, contact   Orthopedic Precautions:N/A   Braces: N/A  Respiratory Status: Room air (removed 1L 02 however was placed back on pt x2 during session)      Occupational Performance:     Bed Mobility:    · NT as pt up in chair     Functional Mobility/Transfers:  · Patient completed Sit <> Stand Transfer with minimum assistance and moderate assistance  with  rolling walker   · Functional Mobility: Pt stood x3 from chair requiring A to lean forward in chair and achieve full stand. Pt was mod A the first 2 trials and min A the second. Pt tolerated static stand and took 4 steps forward in attempt to mobilize to bathroom however placed self in chair abruptly that this therapist was using to follow pt. Pt required seated break. Pt then stood and took ~8 steps forward again placing self in chair with RW with min A. Pt completed third stand tolerating ~10 seconds for chair waffle pillow placement     Activities of Daily Living:  · Feeding:  provided boost  setup in chair   · Grooming: placed in bathroom however ultimately required setup for pt to use in chair. pt was given brush with setup A and complete face cleaning in chair. oral care items setup for pt  .  · Upper Body Dressing: minimum assistance donning gown at edge of chair       [facility] 6 Click ADL: 15    Treatment & Education:  Pt educated on role of occupational therapy, POC, and safety during ADLs and  functional mobility. Pt and OT discussed importance of safe, continued mobility to optimize daily living skills. Pt verbalized understanding.   White board updated during session. Pt given instruction to call for medical staff/nurse for assistance.       Patient left up in chair with all lines intact, call button in reach, RN notified and TONYS camera and pt's MD presentEducation:      GOALS:   Multidisciplinary Problems     Occupational Therapy Goals        Problem: Occupational Therapy    Goal Priority Disciplines Outcome Interventions   Occupational Therapy Goal     OT, PT/OT Ongoing, Progressing    Description: Goals to be met by: 5/24/22     Patient will increase functional independence with ADLs by performing:    Feeding with [place].  UE Dressing with [place].  LE Dressing with Stand-by Assistance.  Grooming while standing at sink with Supervision.  Toileting from toilet with Supervision for hygiene and clothing management.   Toilet transfer to toilet with Supervision.  Pt will engage in functional mobility to simulate household distances in order to maximize functional activity tolerance required for engagement in occupations of choice with supervision using LRAD.                    Time Tracking:     OT Date of Treatment: 05/18/22  OT Start Time: 1003  OT Stop Time: 1029  OT Total Time (min): 26 min    Billable Minutes:Self Care/Home Management 14 min  Therapeutic Activity 12 min    OT/FROILAN: OT     FROILAN Visit Number: 0    5/18/2022

## 2022-05-18 NOTE — ASSESSMENT & PLAN NOTE
Chronic, controlled.  Latest blood pressure and vitals reviewed-   Temp:  [97 °F (36.1 °C)-98 °F (36.7 °C)]   Pulse:  [60-89]   Resp:  [16-20]   BP: ()/(55-64)   SpO2:  [96 %-100 %] .   Home meds for hypertension were reviewed and noted below.   Hypertension Medications             carvediloL (COREG) 12.5 MG tablet Take 1 tablet (12.5 mg total) by mouth 2 (two) times daily with meals.    nitroGLYCERIN (NITROSTAT) 0.4 MG SL tablet Place 1 tablet (0.4 mg total) under the tongue every 5 (five) minutes as needed for Chest pain.    spironolactone (ALDACTONE) 25 MG tablet Take 1 tablet (25 mg total) by mouth once daily.          While in the hospital, will manage blood pressure as follows; Continue home antihypertensive regimen    Will utilize p.r.n. blood pressure medication only if patient's blood pressure greater than  180/110 and she develops symptoms such as worsening chest pain or shortness of breath.

## 2022-05-18 NOTE — ASSESSMENT & PLAN NOTE
- urine studies and exam consistent with hypovolemic hyponatremia  - Improved with IV fluids  - Na improving 132 > 133. Orals encouraged.

## 2022-05-18 NOTE — ASSESSMENT & PLAN NOTE
- reports dizziness on exertion/standing. Possibly related to BP or multifactorial given acute illness, pain medication, and hypovolemia. Hgb stable   - may benefit from dose of meclizine if continues and QT improved  - Continued dizziness and SOB on exertion 2/2 deconditioning vs PE vs hypotension vs hypovolemia.   - Perfusion scan 5/17 indeterminate for PE. Full VQ scan today. BLE US negative for DVT. If PE present, will need discussion +/- AC as she is on DAPT and recent intra-abdominal surgery.  - Decreased Coreg to 6.25 mg BID. Consider transition to Toprol if hypotension continues.  - Orals encouraged

## 2022-05-18 NOTE — ASSESSMENT & PLAN NOTE
Cont PT/OT  Strongly enc pt to participate in care, becomes quite irate when providers suggest she attempt to walke and care for her ostomy   Suspect dizziness related to weakness due to deconditioning   received 2l of ivf. will check repeat lactate level. IVF NS 75cc/hr.  f/u culture results. received dose of zosyn in ED. received 2l of ivf. will check repeat lactate level. IVF NS 75cc/hr.  f/u culture results. received dose of zosyn in ED.  no evidence of acute infection or source.  clinically does not look septic.  repeat lactate. 2.

## 2022-05-18 NOTE — PLAN OF CARE
"BLE US done - negative. Patient remains on 1L NC. No episodes of desatting. Highly anxious and unable to self soothe. She refuses to participate in breathing exercises or anything that would help calm herself. Incontinent, wanting to put Purewick in, currently on Chux pad and encouraging pt to get oob to commode. C/o abd pain. R P-drain with 25ml out. Ostomy w/adequate output. Refusing APAP - claims it doesn't "do anything."  "

## 2022-05-18 NOTE — ASSESSMENT & PLAN NOTE
OR 5/9 sigmoid colectomy, end colostomy, takedown of fistuli, and cholecystectomy    -await return of bowel function, lfd, not eating, no appetite  -continue multi modality for pain control  -encourage ambulation and use of IS  -phani hose and SCD  -prophalactic PPI  -home effient resumed  -quinones dced after negative creatitine from PRASAD, voiding

## 2022-05-18 NOTE — PROGRESS NOTES
[facility]  Colorectal Surgery  Progress Note    Patient Name: Odette Hart  MRN: [id number]  Admission Date: 5/6/2022  Hospital Length of Stay: 9 days  Attending Physician: Rafa Cardozo MD    Subjective:     Interval History: no acute events overnite, did particpate with ostomy lesson, colostomy functional, seems depressed, psy seeing    Post-Op Info:  Procedure(s) (LRB):  COLECTOMY, SIGMOID (ERAS High, lithotomy) (N/A)  COLONOSCOPY (N/A)  CYSTOSCOPY,WITH URETERAL CATHETER INSERTION (Bilateral)  CHOLECYSTECTOMY (N/A)  APPENDECTOMY (N/A)  EXCISION, CECUM (N/A)  CREATION, COLOSTOMY   9 Days Post-Op      Medications:  Continuous Infusions:  Scheduled Meds:   acetaminophen  1,000 mg Oral Q8H    aspirin  81 mg Oral Daily    balsam peru-castor oiL   Topical (Top) BID    carvediloL  6.25 mg Oral BID WM    ezetimibe  10 mg Oral QHS    pantoprazole  40 mg Oral Daily    polyethylene glycol  17 g Oral Daily    prasugreL  10 mg Oral Daily    scopolamine  1 patch Transdermal Q3 Days     PRN Meds:   albuterol-ipratropium    ALPRAZolam    aluminum-magnesium hydroxide-simethicone    dextrose 10%    dextrose 10%    glucagon (human recombinant)    glucose    glucose    insulin aspart U-100    oxyCODONE    prochlorperazine    sodium chloride 0.9%    sodium chloride 0.9%        Objective:     Vital Signs (Most Recent):  Temp: 97 °F (36.1 °C) (05/18/22 0717)  Pulse: 60 (05/18/22 0730)  Resp: 17 (05/18/22 0717)  BP: (!) 98/55 (05/18/22 0717)  SpO2: 100 % (05/18/22 0717)   Vital Signs (24h Range):  Temp:  [97 °F (36.1 °C)-98 °F (36.7 °C)] 97 °F (36.1 °C)  Pulse:  [60-89] 60  Resp:  [16-20] 17  SpO2:  [96 %-100 %] 100 %  BP: ()/(55-64) 98/55     Intake/Output - Last 3 Shifts         05/16 0700 05/17 0659 05/17 0700 05/18 0659 05/18 0700 05/19 0659    P.O. 600 240     I.V. (mL/kg) 1155.6 (19.5)      Other  100     IV Piggyback       Total Intake(mL/kg) 1755.6 (29.6) 340 (5.7)     Urine  (mL/kg/hr) 550 (0.4) 500 (0.4)     Drains 10 55     Stool 500 0     Total Output 1060 555     Net +695.6 -215            Stool Occurrence  1 x             Physical Exam  Vitals and nursing note reviewed.   Constitutional:       Appearance: She is well-developed.   HENT:      Head: Normocephalic.   Eyes:      Pupils: Pupils are equal, round, and reactive to light.   Cardiovascular:      Rate and Rhythm: Normal rate and regular rhythm.      Heart sounds: Normal heart sounds.   Pulmonary:      Effort: Pulmonary effort is normal. No respiratory distress.      Breath sounds: Normal breath sounds. No wheezing or rales.   Abdominal:      Palpations: Abdomen is soft. There is no mass.      Tenderness: There is no guarding or rebound.      Comments: Abd inc line healing well  PRASAD serous drainage  Colostomy flatus    Skin:     General: Skin is warm and dry.      Comments: Skin injury to sacrum    Neurological:      Mental Status: She is alert and oriented to person, place, and time.   Psychiatric:         Behavior: Behavior normal.         Thought Content: Thought content normal.         Judgment: Judgment normal.           Significant Labs:  BMP (Last 3 Results):   Recent Labs   Lab 05/16/22  0607 05/16/22  0820 05/17/22  0428 05/17/22  0952 05/17/22  1603 05/18/22  0429 05/18/22  0800   *   < > 96   < > 113* 100  99 113*   *   < > 132*   < > 132* 133*  133* 134*   K 4.2   < > 3.8   < > 4.5 4.3  4.3 4.7   CL 91*   < > 98   < > 98 100  100 100   CO2 28   < > 25   < > 27 24  26 27   BUN 16   < > 13   < > 11 12  12 12   CREATININE 0.6   < > 0.5   < > 0.6 0.5  0.6 0.6   CALCIUM 7.7*   < > 8.1*   < > 8.1* 8.3*  8.3* 8.3*   MG 1.9  --  2.0  --   --  1.8  --     < > = values in this interval not displayed.     CBC (Last 3 Results):   Recent Labs   Lab 05/16/22  0607 05/17/22  0428 05/18/22  0429   WBC 7.52 5.92 4.44   RBC 3.93* 3.81* 3.52*   HGB 10.8* 10.5* 9.8*   HCT 33.2* 33.4* 30.9*    234 202   MCV 85  88 88   [facility] 27.5 27.6 27.8   [facility] 32.5 31.4* 31.7*       Seignificant Diagnostics:  VQ scan quesetionable PE, US legs negative for dvts, for nuclear lung scan today    Assessment/Plan:     Physical deconditioning  Cont PT/OT  Strongly enc pt to participate in care, becomes quite irate when providers suggest she attempt to walke and care for her ostomy   Suspect dizziness related to weakness due to deconditioning    Hyponatremia  Appreciate medicine recs  IVF intermittent   Monitor labs  Monitor I&O    Alteration in skin integrity in adult  Alteration in skin on sacrum  No skin breakfown  Appreciate wound care recs  Specialty bed  Strongly enc pt to eat and ambulate       Mood disorder  Appreciate psy input    Dizziness  PE work up in progress  C/o of dizziness and some sob when atempting to ambulate    Acute cholecystitis  OR 5/9    Urinary tract infection with pyuria  Consult to ID for hx of UTI with VRE, continuing antibiotics until seen by ID  Appreciate ID input, continue antibiotics 5-7 days post op, to be finished today 5/16    Colovesical fistula  Repaired during surgery 5/9  Abx per ID, transitioned to PO due to high volume with IV, completion date 5/16    Primary hypertension  Chronic, controlled.  Latest blood pressure and vitals reviewed-   Temp:  [97 °F (36.1 °C)-98 °F (36.7 °C)]   Pulse:  [60-89]   Resp:  [16-20]   BP: ()/(55-64)   SpO2:  [96 %-100 %] .   Home meds for hypertension were reviewed and noted below.   Hypertension Medications             carvediloL (COREG) 12.5 MG tablet Take 1 tablet (12.5 mg total) by mouth 2 (two) times daily with meals.    nitroGLYCERIN (NITROSTAT) 0.4 MG SL tablet Place 1 tablet (0.4 mg total) under the tongue every 5 (five) minutes as needed for Chest pain.    spironolactone (ALDACTONE) 25 MG tablet Take 1 tablet (25 mg total) by mouth once daily.          While in the hospital, will manage blood pressure as follows; Continue home antihypertensive  regimen    Will utilize p.r.n. blood pressure medication only if patient's blood pressure greater than  180/110 and she develops symptoms such as worsening chest pain or shortness of breath.    Coronary artery disease of native artery of native heart with stable angina pectoris  Patient with known CAD s/p stent placement and CABG, which is controlled   Tele  Resume home meds    Acute diverticulitis  OR 5/9 sigmoid colectomy, end colostomy, takedown of fistuli, and cholecystectomy    -await return of bowel function, lfd, not eating, no appetite  -continue multi modality for pain control  -encourage ambulation and use of IS  -phani hose and SCD  -prophalactic PPI  -home effient resumed  -quinones dced after negative creatitine from PRASAD, voiding    Automatic implantable cardioverter-defibrillator in situ  Cont tele  Cont home meds  Monitor vs          Carola Buckley NP  Colorectal Surgery  [facility]

## 2022-05-19 PROBLEM — F33.9 RECURRENT MAJOR DEPRESSIVE DISORDER: Status: ACTIVE | Noted: 2022-01-01

## 2022-05-19 PROBLEM — I50.23 ACUTE ON CHRONIC SYSTOLIC HEART FAILURE: Status: ACTIVE | Noted: 2022-05-10

## 2022-05-19 PROBLEM — Z16.21 VRE (VANCOMYCIN-RESISTANT ENTEROCOCCI) INFECTION: Status: ACTIVE | Noted: 2022-01-01

## 2022-05-19 PROBLEM — I95.9 HYPOTENSION (ARTERIAL): Status: ACTIVE | Noted: 2022-01-01

## 2022-05-19 PROBLEM — A49.1 VRE (VANCOMYCIN-RESISTANT ENTEROCOCCI) INFECTION: Status: ACTIVE | Noted: 2022-01-01

## 2022-05-19 NOTE — PROGRESS NOTES
[facility]  Colorectal Surgery  Progress Note    Patient Name: Odette Hart  MRN: [id number]  Admission Date: 5/6/2022  Hospital Length of Stay: 10 days  Attending Physician: Rafa Cardozo MD    Subjective:     Interval History: no acute events overnite, eating a little better, colostomy functional, up in chair most of days yesterday    Post-Op Info:  Procedure(s) (LRB):  COLECTOMY, SIGMOID (ERAS High, lithotomy) (N/A)  COLONOSCOPY (N/A)  CYSTOSCOPY,WITH URETERAL CATHETER INSERTION (Bilateral)  CHOLECYSTECTOMY (N/A)  APPENDECTOMY (N/A)  EXCISION, CECUM (N/A)  CREATION, COLOSTOMY   10 Days Post-Op      Medications:  Continuous Infusions:  Scheduled Meds:   acetaminophen  1,000 mg Oral Q8H    aspirin  81 mg Oral Daily    balsam peru-castor oiL   Topical (Top) BID    ezetimibe  10 mg Oral QHS    magnesium sulfate IVPB  2 g Intravenous Once    [START ON 5/20/2022] metoprolol succinate  25 mg Oral Daily    pantoprazole  40 mg Oral Daily    polyethylene glycol  17 g Oral Daily    prasugreL  10 mg Oral Daily    scopolamine  1 patch Transdermal Q3 Days     PRN Meds:   albuterol-ipratropium    ALPRAZolam    aluminum-magnesium hydroxide-simethicone    dextrose 10%    dextrose 10%    glucagon (human recombinant)    glucose    glucose    insulin aspart U-100    oxyCODONE    prochlorperazine    simethicone    sodium chloride 0.9%    sodium chloride 0.9%        Objective:     Vital Signs (Most Recent):  Temp: 97.6 °F (36.4 °C) (05/19/22 0713)  Pulse: 78 (05/19/22 0746)  Resp: 17 (05/19/22 0713)  BP: (!) 100/51 (05/19/22 0713)  SpO2: 96 % (05/19/22 0713)   Vital Signs (24h Range):  Temp:  [96.5 °F (35.8 °C)-97.7 °F (36.5 °C)] 97.6 °F (36.4 °C)  Pulse:  [69-82] 78  Resp:  [16-18] 17  SpO2:  [96 %-100 %] 96 %  BP: ()/(50-58) 100/51     Intake/Output - Last 3 Shifts         05/17 0700 05/18 0659 05/18 0700 05/19 0659 05/19 0700 05/20 0659    P.O. 240      I.V. (mL/kg)       Other 100       Total Intake(mL/kg) 340 (5.7)      Urine (mL/kg/hr) 500 (0.4) 200 (0.1) 150 (0.7)    Drains 55      Stool 0 100     Total Output 555 300 150    Net -215 -300 -150           Urine Occurrence  3 x     Stool Occurrence 1 x 1 x             Physical Exam  Vitals and nursing note reviewed.   Constitutional:       Appearance: She is well-developed.   HENT:      Head: Normocephalic.   Eyes:      Pupils: Pupils are equal, round, and reactive to light.   Cardiovascular:      Rate and Rhythm: Normal rate and regular rhythm.      Heart sounds: Normal heart sounds.   Pulmonary:      Effort: Pulmonary effort is normal. No respiratory distress.      Breath sounds: Normal breath sounds. No wheezing or rales.   Abdominal:      Palpations: Abdomen is soft. There is no mass.      Tenderness: There is no guarding or rebound.      Comments: Abd inc line healing well  Colotomy functional  PRASAD serous draiange   Skin:     General: Skin is warm and dry.      Comments: Sacral skin injury same   Neurological:      Mental Status: She is alert and oriented to person, place, and time.   Psychiatric:         Behavior: Behavior normal.         Thought Content: Thought content normal.         Judgment: Judgment normal.           Significant Labs:  BMP (Last 3 Results):   Recent Labs   Lab 05/17/22 0428 05/17/22  0952 05/18/22  0429 05/18/22  0800 05/19/22  0003 05/19/22  0622 05/19/22  0837   GLU 96   < > 100  99   < > 89 112* 121*   *   < > 133*  133*   < > 133* 133* 137   K 3.8   < > 4.3  4.3   < > 4.8 4.6 4.6   CL 98   < > 100  100   < > 101 100 102   CO2 25   < > 24  26   < > 27 26 26   BUN 13   < > 12  12   < > 14 14 15   CREATININE 0.5   < > 0.5  0.6   < > 0.5 0.6 0.6   CALCIUM 8.1*   < > 8.3*  8.3*   < > 7.8* 8.1* 8.4*   MG 2.0  --  1.8  --   --  1.7  --     < > = values in this interval not displayed.     CBC (Last 3 Results):   Recent Labs   Lab 05/17/22 0428 05/18/22  0429 05/19/22  0622   WBC 5.92 4.44 4.96   RBC 3.81*  3.52* 3.46*   HGB 10.5* 9.8* 9.5*   HCT 33.4* 30.9* 29.6*    202 170   MCV 88 88 86   MCH 27.6 27.8 27.5   MCHC 31.4* 31.7* 32.1       Significant Diagnostics:  None    Assessment/Plan:     Physical deconditioning  Cont PT/OT  Strongly enc pt to participate in care, becomes quite irate when providers suggest she attempt to walke and care for her ostomy   Suspect dizziness related to weakness due to deconditioning    Hyponatremia  Appreciate medicine recs  IVF intermittent   Monitor labs  Monitor I&O    Alteration in skin integrity in adult  Alteration in skin on sacrum  No skin breakfown  Appreciate wound care recs  Specialty bed  Strongly enc pt to eat and ambulate       Mood disorder  Appreciate psy input    Dizziness  PE work up in progress, negative  C/o of dizziness and some sob when atempting to ambulate, improving with increased activity    Acute cholecystitis  OR 5/9    Urinary tract infection with pyuria  Consult to ID for hx of UTI with VRE, continuing antibiotics until seen by ID  Appreciate ID input, continue antibiotics 5-7 days post op, to be finished today 5/16  Cardona dced and voiding without diff    Colovesical fistula  Repaired during surgery 5/9  Abx per ID, transitioned to PO due to high volume with IV, completion date 5/16    Primary hypertension  Chronic, controlled.  Latest blood pressure and vitals reviewed-   Temp:  [96.5 °F (35.8 °C)-97.7 °F (36.5 °C)]   Pulse:  [69-82]   Resp:  [16-18]   BP: ()/(50-58)   SpO2:  [96 %-100 %] .   Home meds for hypertension were reviewed and noted below.   Hypertension Medications             carvediloL (COREG) 12.5 MG tablet Take 1 tablet (12.5 mg total) by mouth 2 (two) times daily with meals.    nitroGLYCERIN (NITROSTAT) 0.4 MG SL tablet Place 1 tablet (0.4 mg total) under the tongue every 5 (five) minutes as needed for Chest pain.    spironolactone (ALDACTONE) 25 MG tablet Take 1 tablet (25 mg total) by mouth once daily.          While in the  hospital, will manage blood pressure as follows; Continue home antihypertensive regimen    Will utilize p.r.n. blood pressure medication only if patient's blood pressure greater than  180/110 and she develops symptoms such as worsening chest pain or shortness of breath.    Coronary artery disease of native artery of native heart with stable angina pectoris  Patient with known CAD s/p stent placement and CABG, which is controlled   Tele  Resume home meds    Acute diverticulitis  OR 5/9 sigmoid colectomy, end colostomy, takedown of fistuli, and cholecystectomy    -await return of bowel function, lfd, eating better, appetite improved  -continue multi modality for pain control  -encourage ambulation and use of IS  -phani hose and SCD  -prophalactic PPI  -home effient resumed  -quinones dced after negative creatitine from PRASAD, voiding    Automatic implantable cardioverter-defibrillator in situ  Cont tele  Cont home meds  Monitor vs          Carola Buckley NP  Colorectal Surgery  [facility]

## 2022-05-19 NOTE — PLAN OF CARE
05/19/22 1114   Post-Acute Status   Post-Acute Authorization Placement   Post-Acute Placement Status Referrals Sent   Leeann spoke with Kevin in admissions at [facility] ([phone]). He is reviewing referral now and will call LEEANN back.    Update:1:29 PM  LEEANN received call from Kevin that they can clinically accept pt. LEEANN sent auth request to [facility].  LEEANN left message for Cristina at [facility] to make sure she received the request, had to leave a message ([phone]).    UPDATE:2:48 PM  LEEANN spoke with pt at the bedside regarding paperwork for [facility]. LEEANN left paperwork and pen at the bedside for pt to complete.     Cristina Kendall LMSW  [facility]  [phone]

## 2022-05-19 NOTE — ASSESSMENT & PLAN NOTE
· In April - Pseudomonas / Enterobacteroid acute cystitis without hematuria  · Urine cx positive for MDR VRE. ID following, continue  linezolid x 7 days (end date 5/17)  · Finish fluconazole for yeast 5/16  · Cardona d/c on 5/18 after creatinine negative from PRASAD. Voiding well

## 2022-05-19 NOTE — PT/OT/SLP PROGRESS
Physical Therapy Treatment    Patient Name:  Odette Hart   MRN:  [id number]    Recent Surgery: Procedure(s) (LRB):  COLECTOMY, SIGMOID (ERAS High, lithotomy) (N/A)  COLONOSCOPY (N/A)  CYSTOSCOPY,WITH URETERAL CATHETER INSERTION (Bilateral)  CHOLECYSTECTOMY (N/A)  APPENDECTOMY (N/A)  EXCISION, CECUM (N/A)  CREATION, COLOSTOMY 10 Days Post-Op    Recommendations:     Discharge Recommendations:  nursing facility, skilled   Discharge Equipment Recommendations: bedside commode, walker, rolling   Barriers to discharge: Increased level of assist and Decreased caregiver support    Highest Level of Mobility: Gait 15'  Assistance Required: CGA-min(A) w/ RW    Assessment:     Odette Hart is a 66 y.o. female admitted with a medical diagnosis of Calculus of gallbladder with cholecystitis without biliary obstruction.  She presents with the following impairments/functional limitations:  weakness, impaired endurance, impaired self care skills, impaired functional mobilty, gait instability, impaired balance, impaired cardiopulmonary response to activity, pain, decreased safety awareness, decreased lower extremity function. Odette Hart would benefit from acute PT intervention to address listed functional deficits, provide patient/caregiver education, reduce fall risk, and maximize (I) and safety with functional mobility.    Pt met with HOB elevated and agreeable to PT treatment. Today's PT treatment focus was on gait training to improve activity tolerance and address gait deviations. Pt was able to complete 3 gait trials of 15' with CGA-min(A) and RW. Pt is very anxious during mobility and has an increased respiratory rate. PT provided reassurance to pt and guided pt through deep breathing techniques. Pt continues to be a high fall risk and is unsafe to return home at this time.     Pt is progressing towards acute PT goals appropriately and continues to benefit from acute PT sessions. After hospital discharge, pt  "would benefit from SNF to maximize rehab potential.    Rehab Prognosis: Good; patient would benefit from acute skilled PT services to address these deficits and reach maximum level of function.      Plan:     During this hospitalization, patient to be seen 3 x/week to address the identified rehab impairments via gait training, therapeutic activities, therapeutic exercises and progress toward the following goals:    · Plan of Care Expires:  06/10/22    This plan of care has been discussed with the patient/caregiver, who was included in its development and is in agreement with the identified goals and treatment plan.     Subjective     Communicated with RN prior to session.  Patient agreeable to participate.     Pain/Comfort:  · Pain Rating 1: 4/10  · Location - Orientation 1: generalized  · Location 1: abdomen  · Pain Addressed 1: Reposition, Distraction  · Pain Rating Post-Intervention 1: 4/10    Chief Complaint: Fatigue  Patient/Family Comments/goals: "I've been trying to do leg exercises during the day, I'm just so weak"      Objective:     Patient found up in chair with PRASAD drain, colostomy, telemetry ([employer] camera)  upon PT entry to room.    General Precautions: Standard, fall   Orthopedic Precautions:N/A   Braces: N/A         Exams:     Cognition:  o Patient is oriented to Person, Place, Time, Situation, follows commands 100% of the time    Functional Mobility:    Bed Mobility:  · Not assessed as pt found up in chair and returned to chair after session    Transfers:   · Sit to Stand Transfer: Minimal Assistance  from bedside chair with RW AD   · X4 trials   · PT provided multiple cues for proper hand placement on RW             Gait:  · Patient received gait training in room 15 feet with Contact Guard Assistance and Minimal Assistance and rolling walker  · Gait Assessment: occasional unsteady gait, decreased step length, narrow base of support, flexed posture and decreased jan  · Gait Pattern Observed: " Step-through reciprocal gait  · Comments: All lines remained intact throughout ambulation trial, gait belt utilized, chair follow for patient safety. Pt completed 4 trials of 15 ft with chair follow in place. Pt with increased respiratory rate and reported anxiety during mobility. PT guided pt through deep breathing techniques and provided tactile cues for upright posture and chest expansion     Balance:  · Static Stand:   · Contact-Guard Assist with Rolling walker  · Good: Patient able to maintain balance without handhold support, limited postural sway  · Dynamic Stand:  · CGA-min(A) with Rolling walker    Therapeutic Activities/Exercises      Patient assisted with functional mobility as noted above   Patient educated on the importance of early mobility to prevent functional decline during hospital stay   Patient was instructed to utilize staff assistance for mobility/transfers.  o Patient is appropriate to transfer with min(A)-CGA and RN/PCT assist   Patient educated on PT POC and role of PT in acute care   White board updated regarding patient's safest level of mobility with staff assistance, RN also updated.     AM-PAC 6 CLICK MOBILITY  Turning over in bed (including adjusting bedclothes, sheets and blankets)?: 3  Sitting down on and standing up from a chair with arms (e.g., wheelchair, bedside commode, etc.): 3  Moving from lying on back to sitting on the side of the bed?: 2  Moving to and from a bed to a chair (including a wheelchair)?: 3  Need to walk in hospital room?: 3  Climbing 3-5 steps with a railing?: 2  Basic Mobility Total Score: 16     Patient left up in chair with all lines intact, call button in reach and RN notified.        History/Goals:     PAST MEDICAL HISTORY:  Past Medical History:   Diagnosis Date    Acute coronary syndrome     Allergy     Arthritis     Cardiomyopathy     CHF (congestive heart failure)     Coronary artery disease     Coronary artery disease of native artery of  native heart with stable angina pectoris 2021: [facility]: Cath: LAD: Proximal stent patent. LCX: Proximal 80%. LCX: Dominant. Moderate disease. LCX: HEVER 2.75 x 18 mm. Distal dissection. HEVER 2.75 x 22 mm.     Diverticulitis     Diverticulosis     Familial hypercholesterolemia 2022    Former smoker 2022    Heart attack     Heart disease     History of myocardial infarction 2022    Hyperlipidemia     Hypertension     Hypertension 2022    ICD (implantable cardioverter-defibrillator) in place     Non-ST elevation myocardial infarction (NSTEMI) 2019       Past Surgical History:   Procedure Laterality Date    APPENDECTOMY N/A 2022    Procedure: APPENDECTOMY;  Surgeon: Rafa Cardozo MD;  [facility];  Service: Colon and Rectal;  Laterality: N/A;    ATRIAL CARDIAC PACEMAKER INSERTION      CECECTOMY N/A 2022    Procedure: EXCISION, CECUM;  Surgeon: Rafa Cardozo MD;  [facility];  Service: Colon and Rectal;  Laterality: N/A;     SECTION      CHOLECYSTECTOMY N/A 2022    Procedure: CHOLECYSTECTOMY;  Surgeon: Doug Epstein MD;  [facility];  Service: General;  Laterality: N/A;    COLONOSCOPY N/A 2022    Procedure: COLONOSCOPY;  Surgeon: Rafa Cardozo MD;  [facility];  Service: Colon and Rectal;  Laterality: N/A;    COLOSTOMY  2022    Procedure: CREATION, COLOSTOMY;  Surgeon: Rafa Cardozo MD;  [facility];  Service: Colon and Rectal;;    CORONARY STENT PLACEMENT      LEFT HEART CATHETERIZATION Left 2022    Procedure: CATHETERIZATION, HEART, LEFT;  Surgeon: Yohannes Cordova MD;  Location: [facility] CATH LAB;  Service: Cardiology;  Laterality: Left;       GOALS:   Multidisciplinary Problems     Physical Therapy Goals        Problem: Physical Therapy    Goal Priority Disciplines Outcome Goal Variances Interventions   Physical Therapy Goal     PT,  PT/OT Ongoing, Progressing     Description: Goals to be met by: 22     Patient will increase functional independence with mobility by performin. Supine to sit with [place]  2. Sit to supine with [place]  3. Sit to stand transfer with Supervision  4. Bed to chair transfer with Supervision using LRAD  5. Gait  x 150 feet with Supervision using LRAD.   6. Ascend/descend 4 stair with bilateral Handrails and Supervision using LRAD.   7. Lower extremity exercise program x15 reps per handout, with supervision                     Time Tracking:     PT Received On: 22  PT Start Time: 1037     PT Stop Time: 1106  PT Total Time (min): 29 min     Billable Minutes: Gait Training 29      Marion Arriaga, PT  2022  Pager# [phone]

## 2022-05-19 NOTE — ASSESSMENT & PLAN NOTE
- QTc 581 on admit  - Switched antiemetics to compazine  - monitor electrolytes   - avoid other QT prolonging meds  - Improved to 483 on 5/19

## 2022-05-19 NOTE — PROGRESS NOTES
[facility] Medicine  Progress Note    Patient Name: Odette Hart  MRN: [id number]  Patient Class: IP- Inpatient   Admission Date: 5/6/2022  Length of Stay: 10 days  Attending Physician: Rafa Cardozo MD  Primary Care Provider: Braxton Barragan MD        Subjective:     Principal Problem:Calculus of gallbladder with cholecystitis without biliary obstruction        HPI:  Odette Hart is a 66 y.o. 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 Patient was at [facility] and has been complaining of constant LUQ abdominal pain, poor PO intake, and N/V. She has been having abdominal pain, nausea/vomiting, and inability to tolerate PO since she was diagnosed with diverticulitis. They thought her symptoms were due to flagyl and it was discontinued with improvement, however her symptoms returned 10 days ago. She had a CT of her abdomen on 05/04 which showed improving diverticulitis, persistent colovesicular fistula and distended gallbladder with pericholecystic fluid. She denies any fevers, chills, chest pain, dysuria.    General surgery consulted in ED and recommend HIDA.        Overview/Hospital Course:  Ms. Hart was admitted to Hospital Medicine for management of acute cholecystitis.  Gen Surg was consulted and HIDA was ordered.  CRS was consulted who plan for corrective surgery of her colovesical fistula with ostomy placement on 5/9 with combined cholecystectomy. Transferred to CRS service,  consulted for continued care in setting of ADHF. Started on diuresis with lasix 40mg IV BID, symptoms and oxygen requirements now much improved. Repeat echo unchanged with IVC of 3cm so lasix stopped. Now with decreased UOP and downtrending sodium. Will given gentle IVFs. Encouraged oral. Na improving. Psychology consulted  for depression and anxiety. Poor motivation to engage in care. Still with SOB and dizziness on exertion. VQ scan to r/o PE.          Interval History: Na improving 133 > 133. Orals encouraged. Still dizzy and SOB on exertion 2/2 deconditioning vs hypotension vs hypovolemia vs ischemic heart disease. [facility] films reviewed, multivessel disease. Would benefit from continued ischemic w/u outpatient. EKG today without ischemic changes. VQ scan negative for PE. Still hypotensive. Transition Coreg to Toprol with parameters. More motivated. Trial meclizine for dizziness, although reports more lightheadedness than vertigo. Resume home maintenance diuretic tomorrow. Working well with PT and eating and drinking better. Declines anti-depressant. Not sleeping well. No VS overnight and time morning labs after 6am.    Review of Systems   Constitutional:  Negative for chills, fatigue and fever.   HENT:  Negative for congestion and rhinorrhea.    Eyes:  Negative for visual disturbance.   Respiratory:  Positive for shortness of breath. Negative for cough.    Cardiovascular:  Negative for chest pain, palpitations and leg swelling.   Gastrointestinal:  Positive for constipation and nausea. Negative for abdominal pain, diarrhea and vomiting.   Genitourinary:  Negative for dysuria and urgency.   Musculoskeletal:  Negative for back pain.   Skin:  Negative for rash.   Neurological:  Positive for light-headedness. Negative for dizziness, syncope, weakness and headaches.   Psychiatric/Behavioral:  Negative for agitation and confusion.    All other systems reviewed and are negative.  Objective:     Vital Signs (Most Recent):  Temp: 97.6 °F (36.4 °C) (05/19/22 0713)  Pulse: 78 (05/19/22 0746)  Resp: 17 (05/19/22 0713)  BP: (!) 100/51 (05/19/22 0713)  SpO2: 96 % (05/19/22 0713)   Vital Signs (24h Range):  Temp:  [96.5 °F (35.8 °C)-97.7 °F (36.5 °C)] 97.6 °F (36.4 °C)  Pulse:  [69-82] 78  Resp:  [16-18] 17  SpO2:  [96 %-100 %] 96 %  BP:  ()/(50-58) 100/51     Weight: 59.4 kg (130 lb 15.3 oz)  Body mass index is 23.2 kg/m².    Intake/Output Summary (Last 24 hours) at 5/19/2022 1332  Last data filed at 5/19/2022 0815  Gross per 24 hour   Intake 240 ml   Output 450 ml   Net -210 ml        Physical Exam  Constitutional:       Appearance: Normal appearance. She is well-developed.   HENT:      Head: Normocephalic and atraumatic.      Mouth/Throat:      Mouth: Mucous membranes are moist.   Eyes:      Conjunctiva/sclera: Conjunctivae normal.   Neck:      Comments: No JVD  Cardiovascular:      Rate and Rhythm: Normal rate and regular rhythm.      Heart sounds: No murmur heard.  Pulmonary:      Effort: Pulmonary effort is normal. No respiratory distress.      Breath sounds: Normal breath sounds. No wheezing or rales.      Comments: On RA  Abdominal:      General: There is no distension.      Palpations: Abdomen is soft.      Tenderness: There is abdominal tenderness (improving daily).      Comments: Drain with serosanguinous ouput. Colostomy bag with minimal stool and gas, incision site c/d/i   Musculoskeletal:      Right lower leg: No edema.      Left lower leg: No edema.      Comments: PICC in RUE   Skin:     General: Skin is warm.   Neurological:      General: No focal deficit present.      Mental Status: She is alert and oriented to person, place, and time.      Comments: Appears more tired today, muttered speech this afternoon        Significant Labs: All pertinent labs within the past 24 hours have been reviewed.  CBC:   Recent Labs   Lab 05/18/22  0429 05/19/22  0622   WBC 4.44 4.96   HGB 9.8* 9.5*   HCT 30.9* 29.6*    170       CMP:   Recent Labs   Lab 05/18/22  0429 05/18/22  0800 05/19/22  0003 05/19/22  0622 05/19/22  0837   *  133*   < > 133* 133* 137   K 4.3  4.3   < > 4.8 4.6 4.6     100   < > 101 100 102   CO2 24  26   < > 27 26 26     99   < > 89 112* 121*   BUN 12  12   < > 14 14 15   CREATININE 0.5  0.6    < > 0.5 0.6 0.6   CALCIUM 8.3*  8.3*   < > 7.8* 8.1* 8.4*   PROT 6.0  --   --  6.0  --    ALBUMIN 1.7*  --   --  1.7*  --    BILITOT 0.7  --   --  0.7  --    ALKPHOS 81  --   --  87  --    AST 19  --   --  24  --    ALT 12  --   --  12  --    ANIONGAP 9  7*   < > 5* 7* 9   EGFRNONAA >60.0  >60.0   < > >60.0 >60.0 >60.0    < > = values in this interval not displayed.         Significant Imaging: I have reviewed all pertinent imaging results/findings within the past 24 hours.      Assessment/Plan:      * Calculus of gallbladder with cholecystitis without biliary obstruction  Acute diverticulitis  Colovesical fistula  · LUQ, N/V for past few months, worsened over the last few days   · US with cholelithiasis, gallbladder wall thickening and pericholecystic fluid.  No sonographic Rasmussen's sign or wall hyperemia.    · HIDA ordered to determine if her cystic duct in patent  · S/p cholecystectomy with general surgery and ex-lap with ostomy creation with CRS on 5/9  · Finish 5/16 cefepime and flagyl x 7 days post op    Physical deconditioning  -- PT /OT. Working better with today    Hyponatremia  - urine studies and exam consistent with hypovolemic hyponatremia  - Improved with IV fluids  - Na improving 133 > 133. Orals encouraged.  - Spaced lab to once daily    Prolonged Q-T interval on ECG  - QTc 581 on admit  - Switched antiemetics to compazine  - monitor electrolytes   - avoid other QT prolonging meds  - Improved to 483 on 5/19    Alteration in skin integrity in adult  - sacral wound, wound care following       Mood disorder  - patient exhibiting signs of depression and anxiety. Poorly motivated. Not engaging much in care  - Appreciate psychology  - Discussed possibly mirtazapine to also help with appetite and sleep. Declines  - Would avoid Celexa (previously on) due to cardiac history, avoid in prolonged QT, recent MI, CHF, ventricular arrhythmia    Dizziness  - reports dizziness on exertion/standing. Possibly  related to BP or multifactorial given acute illness, pain medication, and hypovolemia. Hgb stable   - may benefit from dose of meclizine if continues and QT improved  - Dizziness and SOB on exertion likely 2/2 deconditioning vs hypotension vs hypovolemia vs ischemic heart disease.   - Ischemic w/u outpatient (see CAD). EKG today without ischemic changes.  - Transition Coreg to Toprol 25 mg daily with parameters for HF  - Orals encouraged  - Trial meclizine for dizziness, although reports more lightheadedness than vertigo.     Acute on chronic systolic heart failure  · Combined HF with EF 20%  · Radiographic evidence of pulmonary edema  · BNP ~ 3000  · Coreg transitioned to metoprolol succinate 25 mg daily for HF due to hypotension. F/u outpatient to optimize  · Low sodium diet with 1.5 L fluid restriction  · Strict I&Os, daily weights  · BNP remains elevated but repeat TTE with small IVC, volume overload resolved  · Resume home maintenance diuretic tomorrow. Avoid today while making changes including meclizine that may affect BP    Results for orders placed during the hospital encounter of 05/06/22    Echo    Interpretation Summary  · The left ventricle is severely enlarged with eccentric hypertrophy and severely decreased systolic function. The estimated ejection fraction is 20%.  · Mild right ventricular enlargement with normal right ventricular systolic function.  · Grade II left ventricular diastolic dysfunction.  · Severe left atrial enlargement.  · Mild-to-moderate mitral regurgitation.  · Mild tricuspid regurgitation.  · The estimated PA systolic pressure is 35 mmHg.  · Normal central venous pressure (3 mmHg).        Anemia  · Stable.   · Blood smear notable for Rouleaux formation      Acute cholecystitis  · As above      Urinary tract infection with pyuria  · In April - Pseudomonas / Enterobacteroid acute cystitis without hematuria  · Urine cx positive for MDR VRE. ID following, continue  linezolid x 7 days  (end date 5/17)  · Finish fluconazole for yeast 5/16  · Cardona d/c on 5/18 after creatinine negative from PRASAD. Voiding well    Colovesical fistula  · Finished Cefepime and Flagyl  · S/p cholecystectomy with general surgery and ex-lap with ostomy creation with CRS 5/9    Primary hypertension  · Chronic and stable  · Transition carvedilol to metoprolol succinate 25 mg daily with parameters, as she remains hypotensive    Familial hypercholesterolemia  Continue Zetia  Might need Niacin    Coronary artery disease of native artery of native heart with stable angina pectoris  · Chronic and stable  · Continue Aspirin 81mg PO daily  · Continue Coreg 12.5mg PO BID  · Prasugrel restarted 5/14  · LHC 1/24/22: mLAD 70%, pCX 80% PCI with 0% stenosis after, mCx 90% PCI with 0% stenosis after, pRCA 50%, RPDA 70%, RPAV 60%  · Symptoms of SOB and dizziness on exertion possible ischemic etiology. [facility] films reviewed. Needs further ischemic workup /[facility] outpatient when safe to heparinize per general surgery. Underlying EKG changes and HFrEF with EF 20% precludes efficacy of stress test  · Can consider ambulation after NTG to assess if improves symptoms on exertion, when BP can tolerate. Remains hypotensive  · EKG today without new ischemic changes. Pathologic q waves in anteroseptal leads unchanged    Acute diverticulitis  · Chronic issue  · Has been on TPN  · CRS following    Automatic implantable cardioverter-defibrillator in situ  · Chronic and stable  · No acute issues      VTE Risk Mitigation (From admission, onward)         Ordered     IP VTE HIGH RISK PATIENT  Once         05/06/22 6327     Place sequential compression device  Until discontinued         05/06/22 0411                    Analy Garrison DO  Department of Hospital Medicine   [facility]

## 2022-05-19 NOTE — ASSESSMENT & PLAN NOTE
OR 5/9 sigmoid colectomy, end colostomy, takedown of fistuli, and cholecystectomy    -await return of bowel function, lfd, eating better, appetite improved  -continue multi modality for pain control  -encourage ambulation and use of IS  -phani hose and SCD  -prophalactic PPI  -home effient resumed  -quinones dced after negative creatitine from PRASAD, voiding

## 2022-05-19 NOTE — ASSESSMENT & PLAN NOTE
· Chronic and stable  · Continue Aspirin 81mg PO daily  · Continue Coreg 12.5mg PO BID  · Prasugrel restarted 5/14  · LHC 1/24/22: mLAD 70%, pCX 80% PCI with 0% stenosis after, mCx 90% PCI with 0% stenosis after, pRCA 50%, RPDA 70%, RPAV 60%  · Symptoms of SOB and dizziness on exertion possible ischemic etiology. [facility] films reviewed. Needs further ischemic workup /[facility] outpatient when safe to heparinize per general surgery. Underlying EKG changes and HFrEF with EF 20% precludes efficacy of stress test  · Can consider ambulation after NTG to assess if improves symptoms on exertion, when BP can tolerate. Remains hypotensive  · EKG today without new ischemic changes. Pathologic q waves in anteroseptal leads unchanged

## 2022-05-19 NOTE — ASSESSMENT & PLAN NOTE
· Finished Cefepime and Flagyl  · S/p cholecystectomy with general surgery and ex-lap with ostomy creation with CRS 5/9

## 2022-05-19 NOTE — SUBJECTIVE & OBJECTIVE
Subjective:     Interval History: no acute events overnite, eating a little better, colostomy functional, up in chair most of days yesterday    Post-Op Info:  Procedure(s) (LRB):  COLECTOMY, SIGMOID (ERAS High, lithotomy) (N/A)  COLONOSCOPY (N/A)  CYSTOSCOPY,WITH URETERAL CATHETER INSERTION (Bilateral)  CHOLECYSTECTOMY (N/A)  APPENDECTOMY (N/A)  EXCISION, CECUM (N/A)  CREATION, COLOSTOMY   10 Days Post-Op      Medications:  Continuous Infusions:  Scheduled Meds:   acetaminophen  1,000 mg Oral Q8H    aspirin  81 mg Oral Daily    balsam peru-castor oiL   Topical (Top) BID    ezetimibe  10 mg Oral QHS    magnesium sulfate IVPB  2 g Intravenous Once    [START ON 5/20/2022] metoprolol succinate  25 mg Oral Daily    pantoprazole  40 mg Oral Daily    polyethylene glycol  17 g Oral Daily    prasugreL  10 mg Oral Daily    scopolamine  1 patch Transdermal Q3 Days     PRN Meds:   albuterol-ipratropium    ALPRAZolam    aluminum-magnesium hydroxide-simethicone    dextrose 10%    dextrose 10%    glucagon (human recombinant)    glucose    glucose    insulin aspart U-100    oxyCODONE    prochlorperazine    simethicone    sodium chloride 0.9%    sodium chloride 0.9%        Objective:     Vital Signs (Most Recent):  Temp: 97.6 °F (36.4 °C) (05/19/22 0713)  Pulse: 78 (05/19/22 0746)  Resp: 17 (05/19/22 0713)  BP: (!) 100/51 (05/19/22 0713)  SpO2: 96 % (05/19/22 0713)   Vital Signs (24h Range):  Temp:  [96.5 °F (35.8 °C)-97.7 °F (36.5 °C)] 97.6 °F (36.4 °C)  Pulse:  [69-82] 78  Resp:  [16-18] 17  SpO2:  [96 %-100 %] 96 %  BP: ()/(50-58) 100/51     Intake/Output - Last 3 Shifts         05/17 0700  05/18 0659 05/18 0700  05/19 0659 05/19 0700 05/20 0659    P.O. 240      I.V. (mL/kg)       Other 100      Total Intake(mL/kg) 340 (5.7)      Urine (mL/kg/hr) 500 (0.4) 200 (0.1) 150 (0.7)    Drains 55      Stool 0 100     Total Output 555 300 150    Net -215 -300 -150           Urine Occurrence  3 x     Stool Occurrence 1 x 1 x              Physical Exam  Vitals and nursing note reviewed.   Constitutional:       Appearance: She is well-developed.   HENT:      Head: Normocephalic.   Eyes:      Pupils: Pupils are equal, round, and reactive to light.   Cardiovascular:      Rate and Rhythm: Normal rate and regular rhythm.      Heart sounds: Normal heart sounds.   Pulmonary:      Effort: Pulmonary effort is normal. No respiratory distress.      Breath sounds: Normal breath sounds. No wheezing or rales.   Abdominal:      Palpations: Abdomen is soft. There is no mass.      Tenderness: There is no guarding or rebound.      Comments: Abd inc line healing well  Colotomy functional  PRASAD serous draiange   Skin:     General: Skin is warm and dry.      Comments: Sacral skin injury same   Neurological:      Mental Status: She is alert and oriented to person, place, and time.   Psychiatric:         Behavior: Behavior normal.         Thought Content: Thought content normal.         Judgment: Judgment normal.           Significant Labs:  BMP (Last 3 Results):   Recent Labs   Lab 05/17/22 0428 05/17/22  0952 05/18/22  0429 05/18/22  0800 05/19/22  0003 05/19/22  0622 05/19/22  0837   GLU 96   < > 100  99   < > 89 112* 121*   *   < > 133*  133*   < > 133* 133* 137   K 3.8   < > 4.3  4.3   < > 4.8 4.6 4.6   CL 98   < > 100  100   < > 101 100 102   CO2 25   < > 24  26   < > 27 26 26   BUN 13   < > 12  12   < > 14 14 15   CREATININE 0.5   < > 0.5  0.6   < > 0.5 0.6 0.6   CALCIUM 8.1*   < > 8.3*  8.3*   < > 7.8* 8.1* 8.4*   MG 2.0  --  1.8  --   --  1.7  --     < > = values in this interval not displayed.     CBC (Last 3 Results):   Recent Labs   Lab 05/17/22 0428 05/18/22 0429 05/19/22  0622   WBC 5.92 4.44 4.96   RBC 3.81* 3.52* 3.46*   HGB 10.5* 9.8* 9.5*   HCT 33.4* 30.9* 29.6*    202 170   MCV 88 88 86   MCH 27.6 27.8 27.5   MCHC 31.4* 31.7* 32.1       Significant Diagnostics:  None

## 2022-05-19 NOTE — ASSESSMENT & PLAN NOTE
Consult to ID for hx of UTI with VRE, continuing antibiotics until seen by ID  Appreciate ID input, continue antibiotics 5-7 days post op, to be finished today 5/16  Cardona dced and voiding without diff

## 2022-05-19 NOTE — ASSESSMENT & PLAN NOTE
- patient exhibiting signs of depression and anxiety. Poorly motivated. Not engaging much in care  - Appreciate psychology  - Discussed possibly mirtazapine to also help with appetite and sleep. Declines  - Would avoid Celexa (previously on) due to cardiac history, avoid in prolonged QT, recent MI, CHF, ventricular arrhythmia

## 2022-05-19 NOTE — ASSESSMENT & PLAN NOTE
· Chronic and stable  · Transition carvedilol to metoprolol succinate 25 mg daily with parameters, as she remains hypotensive

## 2022-05-19 NOTE — SUBJECTIVE & OBJECTIVE
Interval History: Na improving 133 > 133. Orals encouraged. Still dizzy and SOB on exertion 2/2 deconditioning vs hypotension vs hypovolemia vs ischemic heart disease. [facility] films reviewed, multivessel disease. Would benefit from continued ischemic w/u outpatient. EKG today without ischemic changes. VQ scan negative for PE. Still hypotensive. Transition Coreg to Toprol with parameters. More motivated. Trial meclizine for dizziness, although reports more lightheadedness than vertigo. Resume home maintenance diuretic tomorrow. Working well with PT and eating and drinking better. Declines anti-depressant. Not sleeping well. No VS overnight and time morning labs after 6am.    Review of Systems   Constitutional:  Negative for chills, fatigue and fever.   HENT:  Negative for congestion and rhinorrhea.    Eyes:  Negative for visual disturbance.   Respiratory:  Positive for shortness of breath. Negative for cough.    Cardiovascular:  Negative for chest pain, palpitations and leg swelling.   Gastrointestinal:  Positive for constipation and nausea. Negative for abdominal pain, diarrhea and vomiting.   Genitourinary:  Negative for dysuria and urgency.   Musculoskeletal:  Negative for back pain.   Skin:  Negative for rash.   Neurological:  Positive for light-headedness. Negative for dizziness, syncope, weakness and headaches.   Psychiatric/Behavioral:  Negative for agitation and confusion.    All other systems reviewed and are negative.  Objective:     Vital Signs (Most Recent):  Temp: 97.6 °F (36.4 °C) (05/19/22 0713)  Pulse: 78 (05/19/22 0746)  Resp: 17 (05/19/22 0713)  BP: (!) 100/51 (05/19/22 0713)  SpO2: 96 % (05/19/22 0713)   Vital Signs (24h Range):  Temp:  [96.5 °F (35.8 °C)-97.7 °F (36.5 °C)] 97.6 °F (36.4 °C)  Pulse:  [69-82] 78  Resp:  [16-18] 17  SpO2:  [96 %-100 %] 96 %  BP: ()/(50-58) 100/51     Weight: 59.4 kg (130 lb 15.3 oz)  Body mass index is 23.2 kg/m².    Intake/Output Summary (Last 24 hours) at  5/19/2022 1332  Last data filed at 5/19/2022 0815  Gross per 24 hour   Intake 240 ml   Output 450 ml   Net -210 ml        Physical Exam  Constitutional:       Appearance: Normal appearance. She is well-developed.   HENT:      Head: Normocephalic and atraumatic.      Mouth/Throat:      Mouth: Mucous membranes are moist.   Eyes:      Conjunctiva/sclera: Conjunctivae normal.   Neck:      Comments: No JVD  Cardiovascular:      Rate and Rhythm: Normal rate and regular rhythm.      Heart sounds: No murmur heard.  Pulmonary:      Effort: Pulmonary effort is normal. No respiratory distress.      Breath sounds: Normal breath sounds. No wheezing or rales.      Comments: On RA  Abdominal:      General: There is no distension.      Palpations: Abdomen is soft.      Tenderness: There is abdominal tenderness (improving daily).      Comments: Drain with serosanguinous ouput. Colostomy bag with minimal stool and gas, incision site c/d/i   Musculoskeletal:      Right lower leg: No edema.      Left lower leg: No edema.      Comments: PICC in RUE   Skin:     General: Skin is warm.   Neurological:      General: No focal deficit present.      Mental Status: She is alert and oriented to person, place, and time.      Comments: Appears more tired today, muttered speech this afternoon        Significant Labs: All pertinent labs within the past 24 hours have been reviewed.  CBC:   Recent Labs   Lab 05/18/22  0429 05/19/22  0622   WBC 4.44 4.96   HGB 9.8* 9.5*   HCT 30.9* 29.6*    170       CMP:   Recent Labs   Lab 05/18/22  0429 05/18/22  0800 05/19/22  0003 05/19/22  0622 05/19/22  0837   *  133*   < > 133* 133* 137   K 4.3  4.3   < > 4.8 4.6 4.6     100   < > 101 100 102   CO2 24  26   < > 27 26 26     99   < > 89 112* 121*   BUN 12  12   < > 14 14 15   CREATININE 0.5  0.6   < > 0.5 0.6 0.6   CALCIUM 8.3*  8.3*   < > 7.8* 8.1* 8.4*   PROT 6.0  --   --  6.0  --    ALBUMIN 1.7*  --   --  1.7*  --    BILITOT  0.7  --   --  0.7  --    ALKPHOS 81  --   --  87  --    AST 19  --   --  24  --    ALT 12  --   --  12  --    ANIONGAP 9  7*   < > 5* 7* 9   EGFRNONAA >60.0  >60.0   < > >60.0 >60.0 >60.0    < > = values in this interval not displayed.         Significant Imaging: I have reviewed all pertinent imaging results/findings within the past 24 hours.

## 2022-05-19 NOTE — ASSESSMENT & PLAN NOTE
· Combined HF with EF 20%  · Radiographic evidence of pulmonary edema  · BNP ~ 3000  · Coreg transitioned to metoprolol succinate 25 mg daily for HF due to hypotension. F/u outpatient to optimize  · Low sodium diet with 1.5 L fluid restriction  · Strict I&Os, daily weights  · BNP remains elevated but repeat TTE with small IVC, volume overload resolved  · Resume home maintenance diuretic tomorrow. Avoid today while making changes including meclizine that may affect BP    Results for orders placed during the hospital encounter of 05/06/22    Echo    Interpretation Summary  · The left ventricle is severely enlarged with eccentric hypertrophy and severely decreased systolic function. The estimated ejection fraction is 20%.  · Mild right ventricular enlargement with normal right ventricular systolic function.  · Grade II left ventricular diastolic dysfunction.  · Severe left atrial enlargement.  · Mild-to-moderate mitral regurgitation.  · Mild tricuspid regurgitation.  · The estimated PA systolic pressure is 35 mmHg.  · Normal central venous pressure (3 mmHg).

## 2022-05-19 NOTE — ASSESSMENT & PLAN NOTE
- reports dizziness on exertion/standing. Possibly related to BP or multifactorial given acute illness, pain medication, and hypovolemia. Hgb stable   - may benefit from dose of meclizine if continues and QT improved  - Dizziness and SOB on exertion likely 2/2 deconditioning vs hypotension vs hypovolemia vs ischemic heart disease.   - Ischemic w/u outpatient (see CAD). EKG today without ischemic changes.  - Transition Coreg to Toprol 25 mg daily with parameters for HF  - Orals encouraged  - Trial meclizine for dizziness, although reports more lightheadedness than vertigo.

## 2022-05-19 NOTE — PLAN OF CARE
Pt Aox4, VS remained stable throughout shift, pain controlled with PRN meds. Pt to [facility] ambulatory with assist. Educated on POC, resting comfortably, bed low and locked, call light within reach, will continue to monitor.       Problem: Adult Inpatient Plan of Care  Goal: Plan of Care Review  Outcome: Ongoing, Progressing     Problem: Adult Inpatient Plan of Care  Goal: Absence of Hospital-Acquired Illness or Injury  Outcome: Ongoing, Progressing     Problem: Adult Inpatient Plan of Care  Goal: Optimal Comfort and Wellbeing  Outcome: Ongoing, Progressing     Problem: Infection  Goal: Absence of Infection Signs and Symptoms  Outcome: Ongoing, Progressing

## 2022-05-19 NOTE — PT/OT/SLP PROGRESS
"Speech Language Pathology Treatment    Patient Name:  Odette Hart   MRN:  [id number]  Admitting Diagnosis: Calculus of gallbladder with cholecystitis without biliary obstruction    Recommendations:                 General Recommendations:  Follow-up not indicated  Diet recommendations:  Regular, Liquid Diet Level: Thin   Aspiration Precautions: Standard aspiration precautions   General Precautions: Standard, fall  Communication strategies:  none    Subjective     Patient awake and alert.     "that meat is so touch and spicy" - patient comment.     Pain/Comfort:  ·  No c/o pain.     Respiratory Status: Nasal cannula, flow . L/min    Objective:     Has the patient been evaluated by SLP for swallowing?   Yes  Keep patient NPO? No   Current Respiratory Status:        Patient seen for follow up diet assessment.  Patient tolerated thin liquids via straw sips x6 along with regular solids x3 with no overt signs of airway compromise. Both oral and pharyngeal phases of swallow appear WFL. Patient reporting distaste of hospital food, and meats being "tough and spicy".  Patient to remain on regular diet at this time to have optimal vareity of menu options for comfort. Skilled education was provided to patient and family members re: diet recs, standard aspiration precautions of which to follow, and discharge ST plan of care.      Assessment:     Odette Hart is a 66 y.o. female with an SLP diagnosis of no oropharyngeal dysphagia.        Plan:     · Patient to be seen:  3 x/week   · Plan of Care expires:     · Plan of Care reviewed with:  patient   · SLP Follow-Up:  No       Discharge recommendations:  nursing facility, skilled   Barriers to Discharge:  None    Time Tracking:     SLP Treatment Date:   05/19/22  Speech Start Time:  0750  Speech Stop Time:  0757     Speech Total Time (min):  7 min    Billable Minutes: Treatment Swallowing Dysfunction 7    05/19/2022  "

## 2022-05-19 NOTE — PLAN OF CARE
Patient a lot more willing to participate in activities today than yesterday and the day before. Patient alert and oriented, with occasional oxygen needs. Patient in the chair most of the day. Patient participated in PT/OT today.       Problem: Adult Inpatient Plan of Care  Goal: Plan of Care Review  Outcome: Ongoing, Progressing  Goal: Patient-Specific Goal (Individualized)  Outcome: Ongoing, Progressing  Goal: Absence of Hospital-Acquired Illness or Injury  Outcome: Ongoing, Progressing  Goal: Optimal Comfort and Wellbeing  Outcome: Ongoing, Progressing  Goal: Readiness for Transition of Care  Outcome: Ongoing, Progressing     Problem: Infection  Goal: Absence of Infection Signs and Symptoms  Outcome: Ongoing, Progressing     Problem: Fall Injury Risk  Goal: Absence of Fall and Fall-Related Injury  Outcome: Ongoing, Progressing     Problem: Impaired Wound Healing  Goal: Optimal Wound Healing  Outcome: Ongoing, Progressing     Problem: Skin Injury Risk Increased  Goal: Skin Health and Integrity  Outcome: Ongoing, Progressing

## 2022-05-19 NOTE — ASSESSMENT & PLAN NOTE
Chronic, controlled.  Latest blood pressure and vitals reviewed-   Temp:  [96.5 °F (35.8 °C)-97.7 °F (36.5 °C)]   Pulse:  [69-82]   Resp:  [16-18]   BP: ()/(50-58)   SpO2:  [96 %-100 %] .   Home meds for hypertension were reviewed and noted below.   Hypertension Medications             carvediloL (COREG) 12.5 MG tablet Take 1 tablet (12.5 mg total) by mouth 2 (two) times daily with meals.    nitroGLYCERIN (NITROSTAT) 0.4 MG SL tablet Place 1 tablet (0.4 mg total) under the tongue every 5 (five) minutes as needed for Chest pain.    spironolactone (ALDACTONE) 25 MG tablet Take 1 tablet (25 mg total) by mouth once daily.          While in the hospital, will manage blood pressure as follows; Continue home antihypertensive regimen    Will utilize p.r.n. blood pressure medication only if patient's blood pressure greater than  180/110 and she develops symptoms such as worsening chest pain or shortness of breath.

## 2022-05-19 NOTE — ASSESSMENT & PLAN NOTE
Acute diverticulitis  Colovesical fistula  · LUQ, N/V for past few months, worsened over the last few days   · US with cholelithiasis, gallbladder wall thickening and pericholecystic fluid.  No sonographic Rsamussen's sign or wall hyperemia.    · HIDA ordered to determine if her cystic duct in patent  · S/p cholecystectomy with general surgery and ex-lap with ostomy creation with CRS on 5/9  · Finish 5/16 cefepime and flagyl x 7 days post op

## 2022-05-19 NOTE — PLAN OF CARE
NURSING HOME ORDERS    05/19/2022  [facility]  [address]  [address]  [place] [address]  Dept: [phone]  Loc: [phone]     Admit to Nursing Home:  SNF    Diagnoses:  Active Hospital Problems    Diagnosis  POA    *Calculus of gallbladder with cholecystitis without biliary obstruction [K80.10]  Yes    Recurrent major depressive disorder [F33.9]  Yes    Hypotension (arterial) [I95.9]  Yes    VRE (vancomycin-resistant Enterococci) infection [A49.1, Z16.21]  Yes    Physical deconditioning [R53.81]  Yes    Hyponatremia [E87.1]  No    Prolonged Q-T interval on ECG [R94.31]  Yes    Mood disorder [F39]  Yes    Alteration in skin integrity in adult [R23.9]  No    Dizziness [R42]  Yes    Acute on chronic systolic heart failure [I50.23]  Yes    Anemia [D64.9]  Yes    Acute cholecystitis [K81.0]  Yes    Colovesical fistula [N32.1]  Yes    Urinary tract infection with pyuria [N39.0]  Yes     Chronic    Primary hypertension [I10]  Yes    Familial hypercholesterolemia [E78.01]  Yes    Coronary artery disease of native artery of native heart with stable angina pectoris [I25.118]  Yes     1/24/2022: [facility]: Cath: LAD: Proximal stent patent. LCX: Proximal 80%. LCX: Dominant. Moderate disease. LCX: HEVER 2.75 x 18 mm. Distal dissection. HEVER 2.75 x 22 mm.        Acute diverticulitis [K57.92]  Yes     2- CT   1. Sigmoid colon extensive diverticulosis with wall thickening and surrounding fat stranding suggestive of recurrent diverticulitis.  No free air or intraperitoneal fluid collection.  Given the chronicity of sigmoid colon wall thickening compared to the October 25, 2021 examination, consider sigmoidoscopy follow-up to exclude neoplasia.  2. No rectal contrast extravasation to suggest rectovaginal fistula, however there is persistent gas within the bladder, cul-de-sac, and left adnexae which could be seen in vesicovaginal fistula or colorectal fistula.  An  inflamed diverticulum is contiguous with abnormal urinary bladder wall thickening superolaterally on the left.  CT findings are suspicious but not definitive or colovesical fistula.  Could be further evaluated with nonemergent fluoroscopic examination, as clinically indicated.      Automatic implantable cardioverter-defibrillator in situ [Z95.810]  Yes      Resolved Hospital Problems   No resolved problems to display.       Code Status: full    Patient is homebound due to:  Calculus of gallbladder with cholecystitis without biliary obstruction    Allergies:  Review of patient's allergies indicates:   Allergen Reactions    Augmentin [amoxicillin-pot clavulanate] Swelling     Not allergic to amoxicillin just a derivative of augmentin    Lisinopril Other (See Comments)     Fluid around heart    Losartan Other (See Comments)     High potassium    Shellfish containing products Anaphylaxis     Pt.states she is allergic to SEAFOOD since the age of 13    Levaquin [levofloxacin] Other (See Comments)     Very bad joint pain    Clindamycin Palpitations     Chest pain       Vitals:  Every shift    Diet: regular diet    Activities:   Ambulate with assistance to bathroom and Activity as tolerated    Labs:  na    Nursing Precautions:  Aspiration  and Pressure ulcer prevention    Consults:   PT to evaluate and treat- 5 times a week, OT to evaluate and treat- 5 times a week, ST to evaluate and treat- 5 times a week, Wound Care and Nutrition to evaluate and recommend diet     Miscellaneous Care: Colostomy Care:  Empty bag every shift and prn  Wound Care: yes:  Pressure Ulcer(s) Stage I :   Location: sacrum  Apply Miconzazole:  2% cream                       Frequency:  Twice Daily                             If incontinent of stool or urine, apply thin layer Barrier cream                   twice daily and PRN to wound         Pressure relief measure:  for pressure redistribution                            Diabetes Care:         Medications: Discontinue all previous medication orders, if any. See new list below.     Medication List      START taking these medications    albuterol-ipratropium 2.5 mg-0.5 mg/3 mL nebulizer solution  Commonly known as: DUO-NEB  Take 3 mLs by nebulization every 4 (four) hours as needed for Wheezing or Shortness of Breath. Rescue     balsam peru-castor oiL Oint  Apply topically 2 (two) times daily.     meclizine 12.5 mg tablet  Commonly known as: ANTIVERT  Take 1 tablet (12.5 mg total) by mouth 3 (three) times daily.     melatonin 3 mg tablet  Commonly known as: MELATIN  Take 2 tablets (6 mg total) by mouth nightly.     metoprolol succinate 25 MG 24 hr tablet  Commonly known as: TOPROL-XL  Take 1 tablet (25 mg total) by mouth once daily.  Start taking on: May 20, 2022     oxyCODONE 5 MG immediate release tablet  Commonly known as: ROXICODONE  Take 1 tablet (5 mg total) by mouth every 6 (six) hours as needed for Pain.     polyethylene glycol 17 gram Pwpk  Commonly known as: GLYCOLAX  Take 17 g by mouth once daily.  Start taking on: May 20, 2022        CHANGE how you take these medications    * ALPRAZolam 0.5 MG tablet  Commonly known as: XANAX  Take 1 tablet (0.5 mg total) by mouth nightly as needed for Anxiety.  What changed: Another medication with the same name was added. Make sure you understand how and when to take each.     * ALPRAZolam 0.5 MG tablet  Commonly known as: XANAX  Take 1 tablet (0.5 mg total) by mouth nightly as needed for Anxiety.  What changed: You were already taking a medication with the same name, and this prescription was added. Make sure you understand how and when to take each.         * This list has 2 medication(s) that are the same as other medications prescribed for you. Read the directions carefully, and ask your doctor or other care provider to review them with you.            CONTINUE taking these medications    acetaminophen 500 MG tablet  Commonly known as: TYLENOL  Take  2 tablets (1,000 mg total) by mouth every 8 (eight) hours as needed for Pain.     albuterol 90 mcg/actuation inhaler  Commonly known as: PROVENTIL/VENTOLIN HFA  Inhale 2 puffs into the lungs every 6 (six) hours as needed for Wheezing. Rescue     aspirin 81 MG EC tablet  Commonly known as: ECOTRIN  Take 1 tablet (81 mg total) by mouth once daily.     carvediloL 12.5 MG tablet  Commonly known as: COREG  Take 1 tablet (12.5 mg total) by mouth 2 (two) times daily with meals.     ezetimibe 10 mg tablet  Commonly known as: ZETIA  Take 1 tablet (10 mg total) by mouth every evening.     fluticasone propionate 50 mcg/actuation nasal spray  Commonly known as: FLONASE  1 spray (50 mcg total) by Each Nostril route once daily.     Lactobacillus rhamnosus GG 10 billion cell capsule  Commonly known as: CULTURELLE  Take 1 capsule by mouth once daily.     nitroGLYCERIN 0.4 MG SL tablet  Commonly known as: NITROSTAT  Place 1 tablet (0.4 mg total) under the tongue every 5 (five) minutes as needed for Chest pain.     omeprazole 40 MG capsule  Commonly known as: PRILOSEC  Take 1 capsule (40 mg total) by mouth once daily.     prasugreL 10 mg Tab  Commonly known as: EFFIENT  Take 1 tablet (10 mg total) by mouth once daily.     simethicone 80 MG chewable tablet  Commonly known as: MYLICON  Take 80 mg by mouth every 8 (eight) hours as needed.     spironolactone 25 MG tablet  Commonly known as: ALDACTONE  Take 1 tablet (25 mg total) by mouth once daily.        STOP taking these medications    dicyclomine 10 MG capsule  Commonly known as: BENTYL     metronidazole 50 mg/mL Syrg  Commonly known as: FLAGYL     promethazine 25 MG suppository  Commonly known as: PHENERGAN          Dc coreg    Immunizations Administered as of 5/19/2022     Name Date Dose VIS Date Route Exp Date    COVID-19, MRNA, LN-S, PF (Moderna) 8/10/2021 -- -- Intramuscular --    Site: Left arm     Lot: [id number]           see above    Some patients may experience side effects  after vaccination.  These may include fever, headache, muscle or joint aches.  Most symptoms resolve with 24-48 hours and do not require urgent medical evaluation unless they persist for more than 72 hours or symptoms are concerning for an unrelated medical condition.          _________________________________  Carola Buckley NP  05/19/2022

## 2022-05-19 NOTE — ASSESSMENT & PLAN NOTE
- urine studies and exam consistent with hypovolemic hyponatremia  - Improved with IV fluids  - Na improving 133 > 133. Orals encouraged.  - Spaced lab to once daily

## 2022-05-19 NOTE — ASSESSMENT & PLAN NOTE
PE work up in progress, negative  C/o of dizziness and some sob when atempting to ambulate, improving with increased activity

## 2022-05-20 NOTE — PROGRESS NOTES
[facility]  Progress Note    Patient Name: Odette Hart  MRN: [id number]  Patient Class: IP- Inpatient   Admission Date: 5/6/2022  Length of Stay: 11 days  Attending Physician: Rafa Cardozo MD  Primary Care Provider: Braxton Barragan MD        Subjective:     Principal Problem:Calculus of gallbladder with cholecystitis without biliary obstruction        HPI:  Odette Hart is a 66 y.o. 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 Patient was at [facility] and has been complaining of constant LUQ abdominal pain, poor PO intake, and N/V. She has been having abdominal pain, nausea/vomiting, and inability to tolerate PO since she was diagnosed with diverticulitis. They thought her symptoms were due to flagyl and it was discontinued with improvement, however her symptoms returned 10 days ago. She had a CT of her abdomen on 05/04 which showed improving diverticulitis, persistent colovesicular fistula and distended gallbladder with pericholecystic fluid. She denies any fevers, chills, chest pain, dysuria.    General surgery consulted in ED and recommend HIDA.        Overview/Hospital Course:  Ms. Hart was admitted to Hospital Medicine for management of acute cholecystitis.  Gen Surg was consulted and HIDA was ordered.  CRS was consulted who plan for corrective surgery of her colovesical fistula with ostomy placement on 5/9 with combined cholecystectomy. Transferred to CRS service,  consulted for continued care in setting of ADHF. Started on diuresis with lasix 40mg IV BID, symptoms and oxygen requirements now much improved. Repeat echo unchanged with IVC of 3cm so lasix stopped. Now with decreased UOP and downtrending sodium. Will given gentle IVFs. Encouraged oral. Na improving. Psychology consulted  for depression and anxiety. Poor motivation to engage in care. Still with SOB and dizziness on exertion. VQ scan to r/o PE.          Interval History: Na 134. Mild ankle edema. Initiate Lasix 20 mg daily and spironolactone 12.5 mg daily. Sleepy on meclizine. Unclear if helpful. Decrease to prn. SBP slightly improved. Cortisol normal.     Review of Systems   Constitutional:  Negative for chills, fatigue and fever.   HENT:  Negative for congestion and rhinorrhea.    Eyes:  Negative for visual disturbance.   Respiratory:  Positive for shortness of breath. Negative for cough.    Cardiovascular:  Negative for chest pain, palpitations and leg swelling.   Gastrointestinal:  Positive for constipation and nausea. Negative for abdominal pain, diarrhea and vomiting.   Genitourinary:  Negative for dysuria and urgency.   Musculoskeletal:  Negative for back pain.   Skin:  Negative for rash.   Neurological:  Positive for light-headedness. Negative for dizziness, syncope, weakness and headaches.   Psychiatric/Behavioral:  Negative for agitation and confusion.    All other systems reviewed and are negative.  Objective:     Vital Signs (Most Recent):  Temp: 97.6 °F (36.4 °C) (05/20/22 0441)  Pulse: 87 (05/20/22 0738)  Resp: 17 (05/20/22 0738)  BP: 104/60 (05/20/22 0738)  SpO2: 100 % (05/20/22 0738)   Vital Signs (24h Range):  Temp:  [97 °F (36.1 °C)-97.8 °F (36.6 °C)] 97.6 °F (36.4 °C)  Pulse:  [76-98] 87  Resp:  [16-20] 17  SpO2:  [84 %-100 %] 100 %  BP: (101-111)/(54-61) 104/60     Weight: 59.4 kg (130 lb 15.3 oz)  Body mass index is 23.2 kg/m².    Intake/Output Summary (Last 24 hours) at 5/20/2022 1156  Last data filed at 5/20/2022 0659  Gross per 24 hour   Intake 560 ml   Output 365 ml   Net 195 ml        Physical Exam  Constitutional:       Appearance: Normal appearance. She is well-developed.   HENT:      Head: Normocephalic and atraumatic.      Mouth/Throat:      Mouth: Mucous membranes are moist.   Eyes:       Conjunctiva/sclera: Conjunctivae normal.   Neck:      Comments: No JVD  Cardiovascular:      Rate and Rhythm: Normal rate and regular rhythm.      Heart sounds: No murmur heard.  Pulmonary:      Effort: Pulmonary effort is normal. No respiratory distress.      Breath sounds: Normal breath sounds. No wheezing or rales.      Comments: On RA  Abdominal:      General: There is no distension.      Palpations: Abdomen is soft.      Tenderness: There is abdominal tenderness (improving daily).      Comments: Drain with serosanguinous ouput. Colostomy bag with minimal stool and gas, incision site c/d/i   Musculoskeletal:      Right lower leg: No edema.      Left lower leg: No edema.      Comments: PICC in RUE   Skin:     General: Skin is warm.   Neurological:      General: No focal deficit present.      Mental Status: She is alert and oriented to person, place, and time.      Comments: Appears more tired today, muttered speech this afternoon        Significant Labs: All pertinent labs within the past 24 hours have been reviewed.  CBC:   Recent Labs   Lab 05/19/22  0622 05/20/22  0556   WBC 4.96 4.74   HGB 9.5* 9.2*   HCT 29.6* 29.2*    161       CMP:   Recent Labs   Lab 05/19/22  0622 05/19/22  0837 05/20/22  0556   * 137 134*   K 4.6 4.6 4.5    102 102   CO2 26 26 24   * 121* 106   BUN 14 15 13   CREATININE 0.6 0.6 0.5   CALCIUM 8.1* 8.4* 8.2*   PROT 6.0  --  6.2   ALBUMIN 1.7*  --  1.8*   BILITOT 0.7  --  0.9   ALKPHOS 87  --  89   AST 24  --  25   ALT 12  --  13   ANIONGAP 7* 9 8   EGFRNONAA >60.0 >60.0 >60.0         Significant Imaging: I have reviewed all pertinent imaging results/findings within the past 24 hours.      Assessment/Plan:      * Calculus of gallbladder with cholecystitis without biliary obstruction  Acute diverticulitis  Colovesical fistula  · LUQ, N/V for past few months, worsened over the last few days   · US with cholelithiasis, gallbladder wall thickening and pericholecystic  fluid.  No sonographic Rasmussen's sign or wall hyperemia.    · HIDA ordered to determine if her cystic duct in patent  · S/p cholecystectomy with general surgery and ex-lap with ostomy creation with CRS on 5/9  · Finish 5/16 cefepime and flagyl x 7 days post op    Physical deconditioning  -- PT /OT. Working better with    Hyponatremia  - urine studies and exam consistent with hypovolemic hyponatremia  - Improved with IV fluids  - Na improving 133 > 133 > 134. Orals encouraged.    Prolonged Q-T interval on ECG  - QTc 581 on admit  - Switched antiemetics to compazine  - monitor electrolytes   - avoid other QT prolonging meds  - Improved to 483 on 5/19    Alteration in skin integrity in adult  - sacral wound, wound care following       Mood disorder  - patient exhibiting signs of depression and anxiety. Poorly motivated. Not engaging much in care  - Appreciate psychology  - Discussed possibly mirtazapine to also help with appetite and sleep. Declines currently  - Would avoid Celexa (previously on) due to cardiac history, avoid in prolonged QT, recent MI, CHF, ventricular arrhythmia    Dizziness  - reports dizziness on exertion/standing. Possibly related to BP or multifactorial given acute illness, pain medication, and hypovolemia. Hgb stable   - may benefit from dose of meclizine if continues and QT improved  - Dizziness and SOB on exertion likely 2/2 deconditioning vs hypotension vs hypovolemia vs ischemic heart disease.   - Ischemic w/u outpatient (see CAD).  - Transitioned Coreg to Toprol 25 mg daily with parameters for HF due to hypotension  - Orals encouraged  - Meclizine prn for dizziness (unclear if helpful yesterday), although reports more lightheadedness than vertigo.   - D/c scopolamine patch  - Cortisol 8am WNL    Acute on chronic systolic heart failure  · Combined HF with EF 20%  · Radiographic evidence of pulmonary edema  · BNP ~ 3000  · Coreg transitioned to metoprolol succinate 25 mg daily for HF due to  hypotension. F/u outpatient to optimize  · Low sodium diet with 1.5 L fluid restriction  · Strict I&Os, daily weights  · BNP remains elevated but repeat TTE with small IVC, volume overload resolved  · Resume maintenance diuretic. Developing some BLE edema. Uses prn sparingly at home. Lasix 20 mg daily and spironolactone 12.5 mg daily. F/u with cardiologist 1 month    Results for orders placed during the hospital encounter of 05/06/22    Echo    Interpretation Summary  · The left ventricle is severely enlarged with eccentric hypertrophy and severely decreased systolic function. The estimated ejection fraction is 20%.  · Mild right ventricular enlargement with normal right ventricular systolic function.  · Grade II left ventricular diastolic dysfunction.  · Severe left atrial enlargement.  · Mild-to-moderate mitral regurgitation.  · Mild tricuspid regurgitation.  · The estimated PA systolic pressure is 35 mmHg.  · Normal central venous pressure (3 mmHg).        Anemia  · Stable.   · Blood smear notable for Rouleaux formation      Acute cholecystitis  · As above      Urinary tract infection with pyuria  · In April - Pseudomonas / Enterobacteroid acute cystitis without hematuria  · Urine cx positive for MDR VRE. ID following, continue  linezolid x 7 days (end date 5/17)  · Finish fluconazole for yeast 5/16  · Cardona d/c on 5/18 after creatinine negative from PR[facility]. Voiding well    Colovesical fistula  · Finished Cefepime and Flagyl  · S/p cholecystectomy with general surgery and ex-lap with ostomy creation with CRS 5/9    Primary hypertension  · Chronic and stable  · Transitioned carvedilol to metoprolol succinate 25 mg daily with parameters, as she remains hypotensive    Familial hypercholesterolemia  Continue Zetia  Might need Niacin    Coronary artery disease of native artery of native heart with stable angina pectoris  · Chronic and stable  · Continue Aspirin 81mg PO daily  · Continue Coreg 12.5mg PO BID  · Prasugrel  restarted 5/14  · LHC 1/24/22: mLAD 70%, pCX 80% PCI with 0% stenosis after, mCx 90% PCI with 0% stenosis after, pRCA 50%, RPDA 70%, RPAV 60%  · Symptoms of SOB and dizziness on exertion possible ischemic etiology. [facility] films reviewed. F/u with her cardiologist for further ischemic workup /[facility] outpatient when safe to heparinize per general surgery. Underlying EKG changes and HFrEF with EF 20% precludes efficacy of stress test  · EKG repeat without new ischemic changes. Pathologic q waves in anteroseptal leads unchanged    Acute diverticulitis  · Chronic issue  · Has been on TPN  · CRS following    Automatic implantable cardioverter-defibrillator in situ  · Chronic and stable  · No acute issues      VTE Risk Mitigation (From admission, onward)         Ordered     IP VTE HIGH RISK PATIENT  Once         05/06/22 7751     Place sequential compression device  Until discontinued         05/06/22 7411                    Analy Garrison DO  Department of Hospital Medicine   [facility]

## 2022-05-20 NOTE — PLAN OF CARE
05/20/22 1024   Post-Acute Status   Post-Acute Authorization Placement   Post-Acute Placement Status   (NH paperwork)   [facility] can accept pt today, pending completion of nursing home paperwork.    UPDATE:11:36 AM  SW assisted pt at the bedside with NH paperwork. ADAN sent packet to admissions at [facility].     Cristina Kendall LMSW  [facility]  [id number]

## 2022-05-20 NOTE — PT/OT/SLP PROGRESS
Occupational Therapy   Treatment    Name: Odette Hart  MRN: [id number]  Admitting Diagnosis:  Calculus of gallbladder with cholecystitis without biliary obstruction  11 Days Post-Op    Recommendations:     Discharge Recommendations: nursing facility, skilled  Discharge Equipment Recommendations:  bedside commode  Barriers to discharge:  Decreased caregiver support    Assessment:     Odette Hart is a 66 y.o. female with a medical diagnosis of Calculus of gallbladder with cholecystitis without biliary obstruction.  She presents with impaired ADL and mobility performance deficits. Pt found upright in bed and agreeable for therapy today with session focused on  OOB mobility, toileting using BSC, and assisting in filling out d/c paperwork as pt with low vision. Pt required SBA for bed mobility and CGA using RW for short distance bedroom mobility. Pt continues to require increased time for all mobility and cues for step length and posturing as pt oftentimes with decreased jan and kyphotic in nature. Pt reported abdomen discomfort however improved overall OOB tolerance today. Pt left upright on commode setup for bed bath with PCT.     At this time, pt is a high fall risk and not safe to return home. Pt appearing more motivated this date and is eager to participate in therapy. Pt would benefit from continued OT skilled services 3x/wk to improve daily living skills to optimize QOL.  Pt is recommended to discharge to SNF at this time.   Performance deficits affecting function are weakness, impaired functional mobilty, impaired endurance, gait instability, impaired self care skills, impaired balance, impaired cardiopulmonary response to activity, pain.     Rehab Prognosis:  Good; patient would benefit from acute skilled OT services to address these deficits and reach maximum level of function.       Plan:     Patient to be seen 3 x/week to address the above listed problems via self-care/home management,  therapeutic activities, therapeutic exercises  · Plan of Care Expires: 06/09/22  · Plan of Care Reviewed with: patient    Subjective     Pain/Comfort:  · Pain Rating 1: other (see comments) (generalized abdomen pain not ranked)  · Location - Orientation 1: generalized  · Location 1: abdomen  · Pain Addressed 1: Reposition, Distraction    Objective:     Communicated with: RN prior to session.  Patient found HOB elevated with PRASAD drain, colostomy, telemetry (avys camera) upon OT entry to room.    General Precautions: Standard, fall   Orthopedic Precautions:N/A   Braces: N/A  Respiratory Status: NC at 2L, off of pt upon arrival     Occupational Performance:     Bed Mobility:    · Patient completed Rolling/Turning to Right with stand by assistance  · Patient completed Scooting/Bridging with stand by assistance  · Patient completed Supine to Sit with stand by assistance     Functional Mobility/Transfers:  · Patient completed Sit <> Stand Transfer with contact guard assistance  with  rolling walker   · Patient completed Toilet Transfer Step Transfer technique with contact guard assistance with  rolling walker  · Functional Mobility: Pt stood from bed and mobilized 14 steps towards BSC using RW. Pt completed pivot and sat for toileting episode. Pt completed standing pericare with A for upper body dressing management.     Activities of Daily Living:  · Grooming: setup A of lotion for LEs as well as hand wipe following toileting   · Upper Body Dressing: minimum assistance adjusting gown fit at EOB   · Lower Body Dressing: total assistance donning  socks for pt in bed   · Toileting: minimum assistance for clothing management with pt standing following urination       AMPAC 6 Click ADL: 15    Treatment & Education:  Pt educated on role of occupational therapy, POC, and safety during ADLs and functional mobility. Pt and OT discussed importance of safe, continued mobility to optimize daily living skills. Pt verbalized  understanding. White board updated during session. Pt given instruction to call for medical staff/nurse for assistance.       Patient left up on BSC with all lines intact, call button in reach and PCT presentEducation:      GOALS:   Multidisciplinary Problems     Occupational Therapy Goals        Problem: Occupational Therapy    Goal Priority Disciplines Outcome Interventions   Occupational Therapy Goal     OT, PT/OT Ongoing, Progressing    Description: Goals to be met by: 5/24/22     Patient will increase functional independence with ADLs by performing:    Feeding with [place].  UE Dressing with [place].  LE Dressing with Stand-by Assistance.  Grooming while standing at sink with Supervision.  Toileting from toilet with Supervision for hygiene and clothing management.   Toilet transfer to toilet with Supervision.  Pt will engage in functional mobility to simulate household distances in order to maximize functional activity tolerance required for engagement in occupations of choice with supervision using LRAD.                    Time Tracking:     OT Date of Treatment: 05/20/22  OT Start Time: 1032  OT Stop Time: 1058  OT Total Time (min): 26 min    Billable Minutes:Self Care/Home Management 14 min  Therapeutic Activity 12 min    OT/FROILAN: OT     FROILAN Visit Number: 0    5/20/2022

## 2022-05-20 NOTE — PLAN OF CARE
Problem: Occupational Therapy  Goal: Occupational Therapy Goal  Description: Goals to be met by: 5/24/22     Patient will increase functional independence with ADLs by performing:    Feeding with [place].  UE Dressing with [place].  LE Dressing with Stand-by Assistance.  Grooming while standing at sink with Supervision.  Toileting from toilet with Supervision for hygiene and clothing management.   Toilet transfer to toilet with Supervision.  Pt will engage in functional mobility to simulate household distances in order to maximize functional activity tolerance required for engagement in occupations of choice with supervision using LRAD.     Goals remain appropriate. Continue OT as tolerated.  Pita Hart OT  5/20/2022 5/20/2022 1312 by Pita Hart OT  Outcome: Ongoing, Progressing

## 2022-05-20 NOTE — SUBJECTIVE & OBJECTIVE
Interval History: Na 134. Mild ankle edema. Initiate Lasix 20 mg daily and spironolactone 25 mg daily. Sleepy on meclizine. Unclear if helpful. Decrease to prn. SBP slightly improved. Cortisol normal.     Review of Systems   Constitutional:  Negative for chills, fatigue and fever.   HENT:  Negative for congestion and rhinorrhea.    Eyes:  Negative for visual disturbance.   Respiratory:  Positive for shortness of breath. Negative for cough.    Cardiovascular:  Negative for chest pain, palpitations and leg swelling.   Gastrointestinal:  Positive for constipation and nausea. Negative for abdominal pain, diarrhea and vomiting.   Genitourinary:  Negative for dysuria and urgency.   Musculoskeletal:  Negative for back pain.   Skin:  Negative for rash.   Neurological:  Positive for light-headedness. Negative for dizziness, syncope, weakness and headaches.   Psychiatric/Behavioral:  Negative for agitation and confusion.    All other systems reviewed and are negative.  Objective:     Vital Signs (Most Recent):  Temp: 97.6 °F (36.4 °C) (05/20/22 0441)  Pulse: 87 (05/20/22 0738)  Resp: 17 (05/20/22 0738)  BP: 104/60 (05/20/22 0738)  SpO2: 100 % (05/20/22 0738)   Vital Signs (24h Range):  Temp:  [97 °F (36.1 °C)-97.8 °F (36.6 °C)] 97.6 °F (36.4 °C)  Pulse:  [76-98] 87  Resp:  [16-20] 17  SpO2:  [84 %-100 %] 100 %  BP: (101-111)/(54-61) 104/60     Weight: 59.4 kg (130 lb 15.3 oz)  Body mass index is 23.2 kg/m².    Intake/Output Summary (Last 24 hours) at 5/20/2022 1156  Last data filed at 5/20/2022 0659  Gross per 24 hour   Intake 560 ml   Output 365 ml   Net 195 ml        Physical Exam  Constitutional:       Appearance: Normal appearance. She is well-developed.   HENT:      Head: Normocephalic and atraumatic.      Mouth/Throat:      Mouth: Mucous membranes are moist.   Eyes:      Conjunctiva/sclera: Conjunctivae normal.   Neck:      Comments: No JVD  Cardiovascular:      Rate and Rhythm: Normal rate and regular rhythm.       Heart sounds: No murmur heard.  Pulmonary:      Effort: Pulmonary effort is normal. No respiratory distress.      Breath sounds: Normal breath sounds. No wheezing or rales.      Comments: On RA  Abdominal:      General: There is no distension.      Palpations: Abdomen is soft.      Tenderness: There is abdominal tenderness (improving daily).      Comments: Drain with serosanguinous ouput. Colostomy bag with minimal stool and gas, incision site c/d/i   Musculoskeletal:      Right lower leg: No edema.      Left lower leg: No edema.      Comments: PICC in RUE   Skin:     General: Skin is warm.   Neurological:      General: No focal deficit present.      Mental Status: She is alert and oriented to person, place, and time.      Comments: Appears more tired today, muttered speech this afternoon        Significant Labs: All pertinent labs within the past 24 hours have been reviewed.  CBC:   Recent Labs   Lab 05/19/22  0622 05/20/22  0556   WBC 4.96 4.74   HGB 9.5* 9.2*   HCT 29.6* 29.2*    161       CMP:   Recent Labs   Lab 05/19/22  0622 05/19/22  0837 05/20/22  0556   * 137 134*   K 4.6 4.6 4.5    102 102   CO2 26 26 24   * 121* 106   BUN 14 15 13   CREATININE 0.6 0.6 0.5   CALCIUM 8.1* 8.4* 8.2*   PROT 6.0  --  6.2   ALBUMIN 1.7*  --  1.8*   BILITOT 0.7  --  0.9   ALKPHOS 87  --  89   AST 24  --  25   ALT 12  --  13   ANIONGAP 7* 9 8   EGFRNONAA >60.0 >60.0 >60.0         Significant Imaging: I have reviewed all pertinent imaging results/findings within the past 24 hours.

## 2022-05-20 NOTE — ASSESSMENT & PLAN NOTE
- reports dizziness on exertion/standing. Possibly related to BP or multifactorial given acute illness, pain medication, and hypovolemia. Hgb stable   - may benefit from dose of meclizine if continues and QT improved  - Dizziness and SOB on exertion likely 2/2 deconditioning vs hypotension vs hypovolemia vs ischemic heart disease.   - Ischemic w/u outpatient (see CAD).  - Transitioned Coreg to Toprol 25 mg daily with parameters for HF due to hypotension  - Orals encouraged  - Meclizine prn for dizziness (unclear if helpful yesterday), although reports more lightheadedness than vertigo.   - D/c scopolamine patch  - Cortisol 8am WNL

## 2022-05-20 NOTE — PLAN OF CARE
POC reviewed with patient. No complaints of SOB, headaches, or dizziness. Patient did complain of being fatigued after being in chair most of the day. AAOx4, able to assist in her care. VSS on 2L O2 NC. Low fiber/residue diet, no intake during evening shift. No complaints of N/V. Bowel output per ostomy, small. Pain controlled with ordered pain medications. Patient goal was to get as much rest/sleep as she could, enabled. Blood glucose monitored q6h, within range requiring no insulin coverage. Resting with side rails up and call light with in reach. Continuing to monitor patient status.

## 2022-05-20 NOTE — PLAN OF CARE
Froy Dangeloy Patricio GISSU  Discharge Final Note    Primary Care Provider: Braxton Barragan MD    Expected Discharge Date: 5/20/2022    Patient will be discharged to [facility]     Final Discharge Note (most recent)     Final Note - 05/20/22 1350        Final Note    Assessment Type Final Discharge Note     Anticipated Discharge Disposition Skilled Nursing Facility        Post-Acute Status    Post-Acute Authorization Placement   Simultaneous filing. User may not have seen previous data.    Post-Acute Placement Status Set-up Complete/Auth obtained   Simultaneous filing. User may not have seen previous data.                Important Message from Medicare  Important Message from Medicare regarding Discharge Appeal Rights: Given to patient/caregiver, Explained to patient/caregiver, Signed/date by patient/caregiver     Date IMM was signed: 05/20/22  Time IMM was signed: 0945    Nahomy Jalloh RN, CM   Ext: [phone]

## 2022-05-20 NOTE — SUBJECTIVE & OBJECTIVE
Subjective:     Interval History: Patient seen and examined at bedside yesterday. More active yesterday, eating more. Continues to complain of fatigue with activities.    Post-Op Info:  Procedure(s) (LRB):  COLECTOMY, SIGMOID (ERAS High, lithotomy) (N/A)  COLONOSCOPY (N/A)  CYSTOSCOPY,WITH URETERAL CATHETER INSERTION (Bilateral)  CHOLECYSTECTOMY (N/A)  APPENDECTOMY (N/A)  EXCISION, CECUM (N/A)  CREATION, COLOSTOMY   11 Days Post-Op      Medications:  Continuous Infusions:  Scheduled Meds:   acetaminophen  1,000 mg Oral Q8H    aspirin  81 mg Oral Daily    balsam peru-castor oiL   Topical (Top) BID    ezetimibe  10 mg Oral QHS    meclizine  12.5 mg Oral TID    melatonin  6 mg Oral Nightly    metoprolol succinate  25 mg Oral Daily    pantoprazole  40 mg Oral Daily    polyethylene glycol  17 g Oral Daily    prasugreL  10 mg Oral Daily     PRN Meds:   albuterol-ipratropium    ALPRAZolam    aluminum-magnesium hydroxide-simethicone    dextrose 10%    dextrose 10%    glucagon (human recombinant)    glucose    glucose    insulin aspart U-100    oxyCODONE    prochlorperazine    simethicone    sodium chloride 0.9%    sodium chloride 0.9%        Objective:     Vital Signs (Most Recent):  Temp: 97.6 °F (36.4 °C) (05/20/22 0441)  Pulse: 85 (05/20/22 0441)  Resp: 20 (05/20/22 0640)  BP: (!) 101/56 (05/20/22 0441)  SpO2: 99 % (2L O2 NC) (05/20/22 0443)   Vital Signs (24h Range):  Temp:  [97 °F (36.1 °C)-97.8 °F (36.6 °C)] 97.6 °F (36.4 °C)  Pulse:  [76-98] 85  Resp:  [16-20] 20  SpO2:  [84 %-100 %] 99 %  BP: (101-111)/(54-61) 101/56     Intake/Output - Last 3 Shifts         05/18 0700  05/19 0659 05/19 0700  05/20 0659 05/20 0700 05/21 0659    P.O.  720     Other  80     Total Intake(mL/kg)  800 (13.5)     Urine (mL/kg/hr) 200 (0.1) 375 (0.3)     Emesis/NG output  0     Drains  40     Other  0     Stool 100 100     Total Output 300 515     Net -300 +285            Urine Occurrence 3 x 2 x     Stool Occurrence 1 x 1 x     Emesis  Occurrence  0 x             Physical Exam  Vitals and nursing note reviewed.   Constitutional:       Appearance: She is well-developed.   HENT:      Head: Normocephalic.   Eyes:      Pupils: Pupils are equal, round, and reactive to light.   Cardiovascular:      Rate and Rhythm: Normal rate and regular rhythm.      Heart sounds: Normal heart sounds.   Pulmonary:      Effort: Pulmonary effort is normal. No respiratory distress.      Breath sounds: Normal breath sounds. No wheezing or rales.   Abdominal:      Palpations: Abdomen is soft. There is no mass.      Tenderness: There is no guarding or rebound.      Comments: Midline incision with mild drainage, non-purulent  Colotomy functional  PRASAD with SS output   Skin:     General: Skin is warm and dry.      Comments: Sacral skin injury same   Neurological:      Mental Status: She is alert and oriented to person, place, and time.   Psychiatric:         Behavior: Behavior normal.         Thought Content: Thought content normal.         Judgment: Judgment normal.           Significant Labs:  BMP (Last 3 Results):   Recent Labs   Lab 05/17/22 0428 05/17/22  0952 05/18/22  0429 05/18/22  0800 05/19/22  0003 05/19/22  0622 05/19/22  0837   GLU 96   < > 100  99   < > 89 112* 121*   *   < > 133*  133*   < > 133* 133* 137   K 3.8   < > 4.3  4.3   < > 4.8 4.6 4.6   CL 98   < > 100  100   < > 101 100 102   CO2 25   < > 24  26   < > 27 26 26   BUN 13   < > 12  12   < > 14 14 15   CREATININE 0.5   < > 0.5  0.6   < > 0.5 0.6 0.6   CALCIUM 8.1*   < > 8.3*  8.3*   < > 7.8* 8.1* 8.4*   MG 2.0  --  1.8  --   --  1.7  --     < > = values in this interval not displayed.       CBC (Last 3 Results):   Recent Labs   Lab 05/17/22 0428 05/18/22 0429 05/19/22  0622   WBC 5.92 4.44 4.96   RBC 3.81* 3.52* 3.46*   HGB 10.5* 9.8* 9.5*   HCT 33.4* 30.9* 29.6*    202 170   MCV 88 88 86   MCH 27.6 27.8 27.5   MCHC 31.4* 31.7* 32.1         Significant Diagnostics:  None

## 2022-05-20 NOTE — ASSESSMENT & PLAN NOTE
· Chronic and stable  · Continue Aspirin 81mg PO daily  · Continue Coreg 12.5mg PO BID  · Prasugrel restarted 5/14  · C 1/24/22: mLAD 70%, pCX 80% PCI with 0% stenosis after, mCx 90% PCI with 0% stenosis after, pRCA 50%, RPDA 70%, RPAV 60%  · Symptoms of SOB and dizziness on exertion possible ischemic etiology. [facility] films reviewed. F/u with her cardiologist for further ischemic workup /[facility] outpatient when safe to heparinize per general surgery. Underlying EKG changes and HFrEF with EF 20% precludes efficacy of stress test  · EKG repeat without new ischemic changes. Pathologic q waves in anteroseptal leads unchanged

## 2022-05-20 NOTE — DISCHARGE SUMMARY
[facility]  Colorectal Surgery  Discharge Summary      Patient Name: Odette Hart  MRN: [id number]  Admission Date: 5/6/2022  Hospital Length of Stay: 11 days  Discharge Date and Time:  05/20/2022 3:38 PM  Attending Physician: Rafa Cardozo MD   Discharging Provider: Suri Candelario MD  Primary Care Provider: Braxton Barragan MD     HPI: Odette Hart is a 66 y.o. 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 Patient was at [facility] and has been complaining of constant LUQ abdominal pain, poor PO intake, and N/V. She has been having abdominal pain, nausea/vomiting, and inability to tolerate PO since she was diagnosed with diverticulitis. They thought her symptoms were due to flagyl and it was discontinued with improvement, however her symptoms returned 10 days ago. She had a CT of her abdomen on 05/04 which showed improving diverticulitis, persistent colovesicular fistula and distended gallbladder with pericholecystic fluid. She denies any fevers, chills, chest pain, dysuria.      General surgery consulted in ED and recommend HIDA.    Procedure(s) (LRB):  COLECTOMY, SIGMOID (ERAS High, lithotomy) (N/A)  COLONOSCOPY (N/A)  CYSTOSCOPY,WITH URETERAL CATHETER INSERTION (Bilateral)  CHOLECYSTECTOMY (N/A)  APPENDECTOMY (N/A)  EXCISION, CECUM (N/A)  CREATION, COLOSTOMY     Hospital Course: She was admitted to hospital medicine and remained on TPN for planned operation. She tolerated her bowel prep. On 5/9/22 she underwent cystoscopy with BL ureteral catheters, open sigmoid colon resection, appendectomy, and creation of end colostomy. She tolerated the procedure well and was transferred to Colorectal Surgery postop. On POD1 she tolerated some clear liquids. Her pain was well controlled but she was afraid to eat. She was  encouraged to take in PO with boost. Her diet as advanced to regular diet and she continued to have small intake. On POD5 she was given a dose of milk of magnesia and started to have ostomy output. She was appropriately diuresed with medicine consulting. She remained on antibiotics for 7 days postoperatively per ID recommendations. On POD 6-12 she continued to eat more, tolerated her diet, and moved more with PT and OT. PRASAD creatinine was normal and her quinones was removed. She voided without issue. PRASAD creatinine was checked again prior to arrival and discontinued.    Consults (From admission, onward)        Status Ordering Provider     Inpatient consult to [facility]  Once        Provider:  (Not yet assigned)    Acknowledged UDAY CARMONA     Inpatient consult to Social Work  Once        Provider:  (Not yet assigned)    Acknowledged DAVIS DESOUZA     Inpatient consult to Psychology  Once        Provider:  (Not yet assigned)    Completed LU TORIBIO     Inpatient consult to Infectious Diseases  Once        Provider:  (Not yet assigned)    Completed EMANUEL ALVARADO     Inpatient consult to Registered Dietitian/Nutritionist  Once        Provider:  (Not yet assigned)    Completed ROSEANNA WAYNE     Inpatient consult to Registered Dietitian/Nutritionist  Once        Provider:  (Not yet assigned)    Completed NICOLE CROSS     Inpatient consult to General surgery  Once        Provider:  (Not yet assigned)    Completed DWIGHT SCHAFFER          Significant Diagnostic Studies: Labs:   BMP:   Recent Labs   Lab 05/19/22  0622 05/19/22  0837 05/20/22  0556   * 121* 106   * 137 134*   K 4.6 4.6 4.5    102 102   CO2 26 26 24   BUN 14 15 13   CREATININE 0.6 0.6 0.5   CALCIUM 8.1* 8.4* 8.2*   MG 1.7  --  1.7    and CBC   Recent Labs   Lab 05/19/22  0622 05/20/22  0556   WBC 4.96 4.74   HGB 9.5* 9.2*   HCT 29.6* 29.2*    161       Pending Diagnostic Studies:     None        Final Active  Diagnoses:    Diagnosis Date Noted POA    PRINCIPAL PROBLEM:  Calculus of gallbladder with cholecystitis without biliary obstruction [K80.10] 05/06/2022 Yes    Recurrent major depressive disorder [F33.9] 05/19/2022 Yes    Hypotension (arterial) [I95.9] 05/19/2022 Yes    VRE (vancomycin-resistant Enterococci) infection [A49.1, Z16.21] 05/19/2022 Yes    Physical deconditioning [R53.81] 05/16/2022 Yes    Hyponatremia [E87.1] 05/15/2022 No    Prolonged Q-T interval on ECG [R94.31] 05/14/2022 Yes    Mood disorder [F39] 05/13/2022 Yes    Alteration in skin integrity in adult [R23.9] 05/13/2022 No    Dizziness [R42] 05/12/2022 Yes    Acute on chronic systolic heart failure [I50.23] 05/10/2022 Yes    Anemia [D64.9] 05/07/2022 Yes    Acute cholecystitis [K81.0] 05/06/2022 Yes    Colovesical fistula [N32.1] 02/26/2022 Yes    Urinary tract infection with pyuria [N39.0] 02/26/2022 Yes     Chronic    Primary hypertension [I10] 01/24/2022 Yes    Familial hypercholesterolemia [E78.01] 01/22/2022 Yes    Coronary artery disease of native artery of native heart with stable angina pectoris [I25.118] 04/19/2021 Yes    Acute diverticulitis [K57.92] 01/12/2021 Yes    Automatic implantable cardioverter-defibrillator in situ [Z95.810] 02/04/2019 Yes      Problems Resolved During this Admission:      Discharged Condition: fair    Disposition: Short Term Hospital    Follow Up:   Follow-up Information     Rafa Cardozo MD Follow up.    Specialty: Colon and Rectal Surgery  Why: as scheduled  Contact information:  [address]  [facility] [address] [phone]                       Patient Instructions:      Diet Adult Regular   Order Comments: Low residue/low fiber diet     Lifting restrictions   Order Comments: Do not lift more than 20 lbs (2 milk jugs) for 4-6 weeks     No driving until:   Order Comments: No driving within 48 hours of taking narcotic pain medication     Notify your health care provider if you  experience any of the following:  temperature >100.4     Notify your health care provider if you experience any of the following:  persistent nausea and vomiting or diarrhea     Notify your health care provider if you experience any of the following:  severe uncontrolled pain     Notify your health care provider if you experience any of the following:  redness, tenderness, or signs of infection (pain, swelling, redness, odor or green/yellow discharge around incision site)     Nursing communication   Order Comments: Remove picc     Activity as tolerated   Order Comments: Keep active and out of the bed during the day     Shower on day dressing removed (No bath)   Order Comments: Showering ok. Pat incision dry.  Do not swim or take bath until clinic follow up     Medications:  Reconciled Home Medications:      Medication List      START taking these medications    albuterol-ipratropium 2.5 mg-0.5 mg/3 mL nebulizer solution  Commonly known as: DUO-NEB  Take 3 mLs by nebulization every 4 (four) hours as needed for Wheezing or Shortness of Breath. Rescue     balsam peru-castor oiL Oint  Apply topically 2 (two) times daily.     furosemide 20 MG tablet  Commonly known as: LASIX  Take 1 tablet (20 mg total) by mouth once daily.     linezolid 600 mg Tab  Commonly known as: ZYVOX  Take 1 tablet (600 mg total) by mouth every 12 (twelve) hours. for 4 days     meclizine 12.5 mg tablet  Commonly known as: ANTIVERT  Take 1 tablet (12.5 mg total) by mouth 3 (three) times daily as needed for Dizziness.     melatonin 3 mg tablet  Commonly known as: MELATIN  Take 2 tablets (6 mg total) by mouth nightly.     metoprolol succinate 25 MG 24 hr tablet  Commonly known as: TOPROL-XL  Take 1 tablet (25 mg total) by mouth once daily.     oxyCODONE 5 MG immediate release tablet  Commonly known as: ROXICODONE  Take 1 tablet (5 mg total) by mouth every 6 (six) hours as needed for Pain.     polyethylene glycol 17 gram Pwpk  Commonly known as:  GLYCOLAX  Take 17 g by mouth once daily.        CHANGE how you take these medications    * ALPRAZolam 0.5 MG tablet  Commonly known as: XANAX  Take 1 tablet (0.5 mg total) by mouth nightly as needed for Anxiety.  What changed: Another medication with the same name was added. Make sure you understand how and when to take each.     * ALPRAZolam 0.5 MG tablet  Commonly known as: XANAX  Take 1 tablet (0.5 mg total) by mouth nightly as needed for Anxiety.  What changed: You were already taking a medication with the same name, and this prescription was added. Make sure you understand how and when to take each.     spironolactone 25 MG tablet  Commonly known as: ALDACTONE  Take 0.5 tablets (12.5 mg total) by mouth once daily.  What changed: how much to take         * This list has 2 medication(s) that are the same as other medications prescribed for you. Read the directions carefully, and ask your doctor or other care provider to review them with you.            CONTINUE taking these medications    acetaminophen 500 MG tablet  Commonly known as: TYLENOL  Take 2 tablets (1,000 mg total) by mouth every 8 (eight) hours as needed for Pain.     albuterol 90 mcg/actuation inhaler  Commonly known as: PROVENTIL/VENTOLIN HFA  Inhale 2 puffs into the lungs every 6 (six) hours as needed for Wheezing. Rescue     aspirin 81 MG EC tablet  Commonly known as: ECOTRIN  Take 1 tablet (81 mg total) by mouth once daily.     ezetimibe 10 mg tablet  Commonly known as: ZETIA  Take 1 tablet (10 mg total) by mouth every evening.     fluticasone propionate 50 mcg/actuation nasal spray  Commonly known as: FLONASE  1 spray (50 mcg total) by Each Nostril route once daily.     Lactobacillus rhamnosus GG 10 billion cell capsule  Commonly known as: CULTURELLE  Take 1 capsule by mouth once daily.     nitroGLYCERIN 0.4 MG SL tablet  Commonly known as: NITROSTAT  Place 1 tablet (0.4 mg total) under the tongue every 5 (five) minutes as needed for Chest  pain.     omeprazole 40 MG capsule  Commonly known as: PRILOSEC  Take 1 capsule (40 mg total) by mouth once daily.     prasugreL 10 mg Tab  Commonly known as: EFFIENT  Take 1 tablet (10 mg total) by mouth once daily.     simethicone 80 MG chewable tablet  Commonly known as: MYLICON  Take 80 mg by mouth every 8 (eight) hours as needed.        STOP taking these medications    carvediloL 12.5 MG tablet  Commonly known as: COREG     dicyclomine 10 MG capsule  Commonly known as: BENTYL     metronidazole 50 mg/mL Syrg  Commonly known as: FLAGYL     promethazine 25 MG suppository  Commonly known as: PHENERGAN            Suri Candelario MD  Colorectal Surgery  [facility]

## 2022-05-20 NOTE — ASSESSMENT & PLAN NOTE
- urine studies and exam consistent with hypovolemic hyponatremia  - Improved with IV fluids  - Na improving 133 > 133 > 134. Orals encouraged.

## 2022-05-20 NOTE — ASSESSMENT & PLAN NOTE
Patient with known CAD s/p stent placement and CABG, which is controlled   Tele  Resume home meds  Ischemic workup as outpatient

## 2022-05-20 NOTE — NURSING
"  Met with patient at the bedside for continued ostomy lessons, ostomy lesson #4. Patient actively participated in pouch removal, cleansing of the stoma, she remembered the stoma size from previous lesson. Patient needed assistance with cutting of wafer and applying new pouch. Once lesson was completed and questions and concerns addressed. Patient replied, " I will learn eventually." Patient is progressing towards ostomy goal of being able to perform ostomy care independently.  "

## 2022-05-20 NOTE — ASSESSMENT & PLAN NOTE
Chronic, controlled.  Latest blood pressure and vitals reviewed-   Temp:  [97 °F (36.1 °C)-97.8 °F (36.6 °C)]   Pulse:  [76-98]   Resp:  [16-20]   BP: (101-111)/(54-61)   SpO2:  [84 %-100 %] .   Home meds for hypertension were reviewed and noted below.   Hypertension Medications             carvediloL (COREG) 12.5 MG tablet Take 1 tablet (12.5 mg total) by mouth 2 (two) times daily with meals.    nitroGLYCERIN (NITROSTAT) 0.4 MG SL tablet Place 1 tablet (0.4 mg total) under the tongue every 5 (five) minutes as needed for Chest pain.    spironolactone (ALDACTONE) 25 MG tablet Take 1 tablet (25 mg total) by mouth once daily.          While in the hospital, will manage blood pressure as follows; Continue home antihypertensive regimen    Will utilize p.r.n. blood pressure medication only if patient's blood pressure greater than  180/110 and she develops symptoms such as worsening chest pain or shortness of breath.    Medicine following for management of medication regimen, appreciate input

## 2022-05-20 NOTE — PROGRESS NOTES
[facility]  Psychology  Consult Note    Patient Name: Odette Hart  MRN: [id number]   Patient Class: IP- Inpatient  Admission Date: 5/6/2022  Hospital Length of Stay: 11 days  Attending Physician: Rafa Cardozo MD  Primary Care Provider: Braxton Barragan MD    Consults  History of Present Illness: Odette Hart is a 66 y.o. female with a PMHx of CAD s/p 2x HEVER placement in 1/2022 (on effient), CHF (EF 20%), ICD, and colovesicular fistula 2/2 complicated diverticulitis who presented to the ED with concerns for acute cholecystitis found on ultrasound    Symptoms  · Mood: Endorsed low mood but clarified that mood has lifted over the past few days. Denied anhedonia. Denied suicidal ideation.  · Anxiety: Mild anxiety related to worries about health, recovery, functional impairments. Also noted anxiety associated with participation in PT.  · Pain: Rated current pain as 5/10. Denied concerns regarding pain management.  · Appetite: Decreased.  · Sleep: Mild-moderate impairment involving delayed onset and regular nighttime awakenings.    Psychotherapeutics (From admission, onward)            Start     Stop Route Frequency Ordered    05/07/22 0120  ALPRAZolam tablet 0.5 mg         -- Oral Nightly PRN 05/07/22 0121        Intervention: Engaged patient in brief Motivational Interviewing focused on participation in rehab therapies. Elicited description of health values and recovery goals. Patient described strong desire to regain independence. Assisted patient in linking recovery goals to therapy participation.    Mental Status Exam  General Appearance:  unremarkable, age appropriate, good grooming and hygiene   Speech: Normal rate, volume, and prosody      Level of Cooperation: cooperative      Thought Processes: normal and logical   Mood: dysphoric      Thought Content: normal, no suicidality, no homicidality, delusions, or paranoia   Affect: congruent and appropriate   Orientation: Oriented x 4   Memory: No  deficits noted.   Attention & Concentration: intact   Fund of General Knowledge: intact and appropriate to age and level of education   Abstract Reasoning: No deficits noted.   Judgment & Insight: good   Language: intact   Behavioral Observations: Laying with head of bed elevated. Good eye contact. No signs of psychomotor agitation or retardation.     Diagnostic Impression - Plan:     Active Diagnoses:    Diagnosis Date Noted POA    PRINCIPAL PROBLEM:  Calculus of gallbladder with cholecystitis without biliary obstruction [K80.10] 05/06/2022 Yes    Recurrent major depressive disorder [F33.9] 05/19/2022 Yes    Hypotension (arterial) [I95.9] 05/19/2022 Yes    VRE (vancomycin-resistant Enterococci) infection [A49.1, Z16.21] 05/19/2022 Yes    Physical deconditioning [R53.81] 05/16/2022 Yes    Hyponatremia [E87.1] 05/15/2022 No    Prolonged Q-T interval on ECG [R94.31] 05/14/2022 Yes    Mood disorder [F39] 05/13/2022 Yes    Alteration in skin integrity in adult [R23.9] 05/13/2022 No    Dizziness [R42] 05/12/2022 Yes    Acute on chronic systolic heart failure [I50.23] 05/10/2022 Yes    Anemia [D64.9] 05/07/2022 Yes    Acute cholecystitis [K81.0] 05/06/2022 Yes    Colovesical fistula [N32.1] 02/26/2022 Yes    Urinary tract infection with pyuria [N39.0] 02/26/2022 Yes     Chronic    Primary hypertension [I10] 01/24/2022 Yes    Familial hypercholesterolemia [E78.01] 01/22/2022 Yes    Coronary artery disease of native artery of native heart with stable angina pectoris [I25.118] 04/19/2021 Yes    Acute diverticulitis [K57.92] 01/12/2021 Yes    Automatic implantable cardioverter-defibrillator in situ [Z95.810] 02/04/2019 Yes      Problems Resolved During this Admission:     Impression: Odette Hart is a 66 y.o. female with a PMHx of CAD s/p 2x HEVER placement in 1/2022 (on effient), CHF (EF 20%), ICD, and colovesicular fistula 2/2 complicated diverticulitis who presented to the ED with concerns for acute  cholecystitis found on ultrasound. Psychology consulted to assess depression and anxiety. Patient endorsed mild anxiety and depressive symptoms. Patient has a history of depression. Current medical stress has exacerbated baseline symptoms. Patient meets criteria for Major Depressive Disorder, Recurrent, In partial remission. Patient described good motivation for rehab therapies. Overall, she appears to be coping within normal limits.    Plan: Psychology will continue to follow.    Recommendations: Follow up with outpatient Psychology or Counseling. Provided Psychiatry Dept. Contact info and encouraged pt to schedule.       Length of Service: 24 minutes    Alan Long, PhD  Psychology  [facility]

## 2022-05-20 NOTE — PROGRESS NOTES
[facility]  Colorectal Surgery  Progress Note    Patient Name: Odette Hart  MRN: [id number]  Admission Date: 5/6/2022  Hospital Length of Stay: 11 days  Attending Physician: Rafa Cardozo MD    Subjective:     Interval History: Patient seen and examined at bedside yesterday. More active yesterday, eating more. Continues to complain of fatigue with activities.    Post-Op Info:  Procedure(s) (LRB):  COLECTOMY, SIGMOID (ERAS High, lithotomy) (N/A)  COLONOSCOPY (N/A)  CYSTOSCOPY,WITH URETERAL CATHETER INSERTION (Bilateral)  CHOLECYSTECTOMY (N/A)  APPENDECTOMY (N/A)  EXCISION, CECUM (N/A)  CREATION, COLOSTOMY   11 Days Post-Op      Medications:  Continuous Infusions:  Scheduled Meds:   acetaminophen  1,000 mg Oral Q8H    aspirin  81 mg Oral Daily    balsam peru-castor oiL   Topical (Top) BID    ezetimibe  10 mg Oral QHS    meclizine  12.5 mg Oral TID    melatonin  6 mg Oral Nightly    metoprolol succinate  25 mg Oral Daily    pantoprazole  40 mg Oral Daily    polyethylene glycol  17 g Oral Daily    prasugreL  10 mg Oral Daily     PRN Meds:   albuterol-ipratropium    ALPRAZolam    aluminum-magnesium hydroxide-simethicone    dextrose 10%    dextrose 10%    glucagon (human recombinant)    glucose    glucose    insulin aspart U-100    oxyCODONE    prochlorperazine    simethicone    sodium chloride 0.9%    sodium chloride 0.9%        Objective:     Vital Signs (Most Recent):  Temp: 97.6 °F (36.4 °C) (05/20/22 0441)  Pulse: 85 (05/20/22 0441)  Resp: 20 (05/20/22 0640)  BP: (!) 101/56 (05/20/22 0441)  SpO2: 99 % (2L O2 NC) (05/20/22 0443)   Vital Signs (24h Range):  Temp:  [97 °F (36.1 °C)-97.8 °F (36.6 °C)] 97.6 °F (36.4 °C)  Pulse:  [76-98] 85  Resp:  [16-20] 20  SpO2:  [84 %-100 %] 99 %  BP: (101-111)/(54-61) 101/56     Intake/Output - Last 3 Shifts         05/18 0700 05/19 0659 05/19 0700 05/20 0659 05/20 0700 05/21 0659    P.O.  720     Other  80     Total Intake(mL/kg)  800  (13.5)     Urine (mL/kg/hr) 200 (0.1) 375 (0.3)     Emesis/NG output  0     Drains  40     Other  0     Stool 100 100     Total Output 300 515     Net -300 +285            Urine Occurrence 3 x 2 x     Stool Occurrence 1 x 1 x     Emesis Occurrence  0 x             Physical Exam  Vitals and nursing note reviewed.   Constitutional:       Appearance: She is well-developed.   HENT:      Head: Normocephalic.   Eyes:      Pupils: Pupils are equal, round, and reactive to light.   Cardiovascular:      Rate and Rhythm: Normal rate and regular rhythm.      Heart sounds: Normal heart sounds.   Pulmonary:      Effort: Pulmonary effort is normal. No respiratory distress.      Breath sounds: Normal breath sounds. No wheezing or rales.   Abdominal:      Palpations: Abdomen is soft. There is no mass.      Tenderness: There is no guarding or rebound.      Comments: Midline incision with mild drainage, non-purulent  Colotomy functional  PRASAD with SS output   Skin:     General: Skin is warm and dry.      Comments: Sacral skin injury same   Neurological:      Mental Status: She is alert and oriented to person, place, and time.   Psychiatric:         Behavior: Behavior normal.         Thought Content: Thought content normal.         Judgment: Judgment normal.           Significant Labs:  BMP (Last 3 Results):   Recent Labs   Lab 05/17/22 0428 05/17/22  0952 05/18/22  0429 05/18/22  0800 05/19/22  0003 05/19/22  0622 05/19/22  0837   GLU 96   < > 100  99   < > 89 112* 121*   *   < > 133*  133*   < > 133* 133* 137   K 3.8   < > 4.3  4.3   < > 4.8 4.6 4.6   CL 98   < > 100  100   < > 101 100 102   CO2 25   < > 24  26   < > 27 26 26   BUN 13   < > 12  12   < > 14 14 15   CREATININE 0.5   < > 0.5  0.6   < > 0.5 0.6 0.6   CALCIUM 8.1*   < > 8.3*  8.3*   < > 7.8* 8.1* 8.4*   MG 2.0  --  1.8  --   --  1.7  --     < > = values in this interval not displayed.       CBC (Last 3 Results):   Recent Labs   Lab 05/17/22 0428  05/18/22  0429 05/19/22  0622   WBC 5.92 4.44 4.96   RBC 3.81* 3.52* 3.46*   HGB 10.5* 9.8* 9.5*   HCT 33.4* 30.9* 29.6*    202 170   MCV 88 88 86   MCH 27.6 27.8 27.5   MCHC 31.4* 31.7* 32.1         Significant Diagnostics:  None    Assessment/Plan:     Physical deconditioning  Cont PT/OT  Strongly enc pt to participate in care, becomes quite irate when providers suggest she attempt to walke and care for her ostomy   Suspect dizziness related to weakness due to deconditioning    Hyponatremia  Appreciate medicine recs  IVF intermittent   Monitor labs  Monitor I&O    Alteration in skin integrity in adult  Alteration in skin on sacrum  No skin breakfown  Appreciate wound care recs  Specialty bed  Strongly enc pt to eat and ambulate       Mood disorder  Appreciate psy input    Dizziness  PE work up in progress, negative  C/o of dizziness and some sob when atempting to ambulate, improving with increased activity    Acute cholecystitis  OR 5/9    Urinary tract infection with pyuria  Consult to ID for hx of UTI with VRE, continuing antibiotics until seen by ID  Appreciate ID input, continue antibiotics 5-7 days post op, finished 5/16  Cardona dced and voiding without diff    Colovesical fistula  Repaired during surgery 5/9  Abx per ID, transitioned to PO due to high volume with IV, completion date 5/16    Primary hypertension  Chronic, controlled.  Latest blood pressure and vitals reviewed-   Temp:  [97 °F (36.1 °C)-97.8 °F (36.6 °C)]   Pulse:  [76-98]   Resp:  [16-20]   BP: (101-111)/(54-61)   SpO2:  [84 %-100 %] .   Home meds for hypertension were reviewed and noted below.   Hypertension Medications             carvediloL (COREG) 12.5 MG tablet Take 1 tablet (12.5 mg total) by mouth 2 (two) times daily with meals.    nitroGLYCERIN (NITROSTAT) 0.4 MG SL tablet Place 1 tablet (0.4 mg total) under the tongue every 5 (five) minutes as needed for Chest pain.    spironolactone (ALDACTONE) 25 MG tablet Take 1 tablet (25  mg total) by mouth once daily.          While in the hospital, will manage blood pressure as follows; Continue home antihypertensive regimen    Will utilize p.r.n. blood pressure medication only if patient's blood pressure greater than  180/110 and she develops symptoms such as worsening chest pain or shortness of breath.    Medicine following for management of medication regimen, appreciate input    Coronary artery disease of native artery of native heart with stable angina pectoris  Patient with known CAD s/p stent placement and CABG, which is controlled   Tele  Resume home meds  Ischemic workup as outpatient    Acute diverticulitis  OR 5/9 sigmoid colectomy, end colostomy, takedown of fistuli, and cholecystectomy    -lfd, eating better, appetite improved  -continue multi modality for pain control  -encourage ambulation and use of IS  -phani hose and SCD  -prophalactic PPI  -home effient resumed  -quinones dced after negative creatitine from PRASAD, voiding    Automatic implantable cardioverter-defibrillator in situ  Cont tele  Cont home meds  Monitor vs  Will plan for outpatient ischemic workup          Suri Candelario MD  Colorectal Surgery  [facility]

## 2022-05-20 NOTE — ASSESSMENT & PLAN NOTE
Consult to ID for hx of UTI with VRE, continuing antibiotics until seen by ID  Appreciate ID input, continue antibiotics 5-7 days post op, finished 5/16  Cardona dced and voiding without diff

## 2022-05-20 NOTE — PLAN OF CARE
05/20/22 1350   Post-Acute Status   Post-Acute Authorization Placement   Post-Acute Placement Status Set-up Complete/Auth obtained   Pt going to [facility]. SW arranged wheelchair transport via [facility]. Requested  time is 3:000 PM. Requested  time does not guarantee arrival time.     Pt going to room P101 and nurse can call report  to [phone].      Cristina Kendall LMSW  [facility]  [id number]

## 2022-05-20 NOTE — ASSESSMENT & PLAN NOTE
· Combined HF with EF 20%  · Radiographic evidence of pulmonary edema  · BNP ~ 3000  · Coreg transitioned to metoprolol succinate 25 mg daily for HF due to hypotension. F/u outpatient to optimize  · Low sodium diet with 1.5 L fluid restriction  · Strict I&Os, daily weights  · BNP remains elevated but repeat TTE with small IVC, volume overload resolved  · Resume maintenance diuretic. Developing some BLE edema. Uses prn sparingly at home. Lasix 20 mg daily and spironolactone 25 mg daily. F/u with cardiologist 1 month    Results for orders placed during the hospital encounter of 05/06/22    Echo    Interpretation Summary  · The left ventricle is severely enlarged with eccentric hypertrophy and severely decreased systolic function. The estimated ejection fraction is 20%.  · Mild right ventricular enlargement with normal right ventricular systolic function.  · Grade II left ventricular diastolic dysfunction.  · Severe left atrial enlargement.  · Mild-to-moderate mitral regurgitation.  · Mild tricuspid regurgitation.  · The estimated PA systolic pressure is 35 mmHg.  · Normal central venous pressure (3 mmHg).

## 2022-05-20 NOTE — ASSESSMENT & PLAN NOTE
OR 5/9 sigmoid colectomy, end colostomy, takedown of fistuli, and cholecystectomy    -lfd, eating better, appetite improved  -continue multi modality for pain control  -encourage ambulation and use of IS  -phani hose and SCD  -prophalactic PPI  -home effient resumed  -quinones dced after negative creatitine from PRASAD, voiding

## 2022-05-20 NOTE — ASSESSMENT & PLAN NOTE
· Chronic and stable  · Transitioned carvedilol to metoprolol succinate 25 mg daily with parameters, as she remains hypotensive

## 2022-05-23 NOTE — ED TRIAGE NOTES
Pt presents to ED c/o SOB on exertion. States this has been occurring since major abdominal surgery on 5/9. States when she moves or sits up, it is hard to catch her breath. Denies chest pain, N/V, fever at home.

## 2022-05-23 NOTE — ED PROVIDER NOTES
Encounter Date: 5/23/2022    SCRIBE #1 NOTE: I, Debra Reyes, am scribing for, and in the presence of, Ajith Diaz II, MD.       History     Chief Complaint   Patient presents with    Shortness of Breath     Reporting SOB, fatigue x4 days. Recently seen in ED for similar s/s. Extensive Hx.      Time seen by provider: 3:47 PM    This is a 66 y.o. female with PMHx of HTN, HLD, CAD, CHF with EF of 20%, MI and ICD who presents with complaint of shortness of breath x 2 weeks. Patient explains that she had an appendectomy, colostomy, cecectomy, and cholecystectomy on 05/09. Since the surgery, she has been recovering in a rehab facility. She states that she has been experiencing shortness of breath on exertion since the surgery. No fever or cough. She reports nausea but no vomiting. Patient's cardiologist is Dr. Arenas. She denies smoking, drinking or drug use. This is the extent of the patient's complaints at this time.    The history is provided by the patient.     Review of patient's allergies indicates:   Allergen Reactions    Augmentin [amoxicillin-pot clavulanate] Swelling     Not allergic to amoxicillin just a derivative of augmentin    Lisinopril Other (See Comments)     Fluid around heart    Losartan Other (See Comments)     High potassium    Shellfish containing products Anaphylaxis     Pt.states she is allergic to SEAFOOD since the age of 13    Levaquin [levofloxacin] Other (See Comments)     Very bad joint pain    Clindamycin Palpitations     Chest pain     Past Medical History:   Diagnosis Date    Acute coronary syndrome     Allergy     Arthritis     Cardiomyopathy     CHF (congestive heart failure)     Coronary artery disease     Coronary artery disease of native artery of native heart with stable angina pectoris 4/19/2021 1/24/2022: [facility]: Cath: LAD: Proximal stent patent. LCX: Proximal 80%. LCX: Dominant. Moderate disease. LCX: HEVER 2.75 x 18 mm. Distal dissection. HEVER 2.75 x 22 mm.      Diverticulitis     Diverticulosis     Familial hypercholesterolemia 2022    Former smoker 2022    Heart attack     Heart disease     History of myocardial infarction 2022    Hyperlipidemia     Hypertension     Hypertension 2022    ICD (implantable cardioverter-defibrillator) in place     Non-ST elevation myocardial infarction (NSTEMI) 2019     Past Surgical History:   Procedure Laterality Date    APPENDECTOMY N/A 2022    Procedure: APPENDECTOMY;  Surgeon: Rafa Cardozo MD;  Location: [facility] OR 2ND FLR;  Service: Colon and Rectal;  Laterality: N/A;    ATRIAL CARDIAC PACEMAKER INSERTION      CECECTOMY N/A 2022    Procedure: EXCISION, CECUM;  Surgeon: Rafa Cardozo MD;  Location: [facility] OR [facility] FLR;  Service: Colon and Rectal;  Laterality: N/A;     SECTION      CHOLECYSTECTOMY N/A 2022    Procedure: CHOLECYSTECTOMY;  Surgeon: Doug Epstein MD;  Location: [facility] OR [facility] FLR;  Service: General;  Laterality: N/A;    COLONOSCOPY N/A 2022    Procedure: COLONOSCOPY;  Surgeon: Rafa Cardozo MD;  Location: [facility] OR 2ND FLR;  Service: Colon and Rectal;  Laterality: N/A;    COLOSTOMY  2022    Procedure: CREATION, COLOSTOMY;  Surgeon: Rafa Cardozo MD;  Location: [facility] OR 2ND FLR;  Service: Colon and Rectal;;    CORONARY STENT PLACEMENT      LEFT HEART CATHETERIZATION Left 2022    Procedure: CATHETERIZATION, HEART, LEFT;  Surgeon: Yohannes Cordova MD;  Location: [facility] CATH LAB;  Service: Cardiology;  Laterality: Left;     Family History   Problem Relation Age of Onset    Heart disease Mother     Emphysema Father     Heart attack Brother      Social History     Tobacco Use    Smoking status: Former Smoker     Packs/day: 0.50     Start date: 1976     Quit date: 2012     Years since quittin.4    Smokeless tobacco: Never Used   Substance Use Topics    Alcohol use: No    Drug use: No     Review of Systems    Constitutional: Negative for fever.   HENT: Negative for sore throat.    Respiratory: Positive for shortness of breath. Negative for cough.    Cardiovascular: Negative for chest pain.   Gastrointestinal: Positive for nausea. Negative for vomiting.   Genitourinary: Negative for dysuria.   Musculoskeletal: Negative for back pain.   Skin: Negative for rash.   Neurological: Negative for weakness.   Hematological: Does not bruise/bleed easily.       Physical Exam     Initial Vitals   BP Pulse Resp Temp SpO2   05/23/22 1446 05/23/22 1446 05/23/22 1446 05/23/22 1546 05/23/22 1446   (!) 106/53 79 18 97.4 °F (36.3 °C) 100 %      MAP       --                Physical Exam    Nursing note and vitals reviewed.  Constitutional: She appears well-developed. She is not diaphoretic. No distress.   Frail. Chronically ill-appearing.   HENT:   Head: Normocephalic and atraumatic.   Eyes: EOM are normal.   Neck: Neck supple. No JVD present.   Normal range of motion.  Cardiovascular: Normal rate, regular rhythm and normal heart sounds. Exam reveals no gallop and no friction rub.    No murmur heard.  Pulmonary/Chest: No respiratory distress. She has no rhonchi.   Not tachypneic on stretcher. Good air exchange. No expiratory wheezes. Bibasilar rales.   Abdominal:   Colostomy bag LLQ without surrounding erythema with brown stool.   Musculoskeletal:         General: No tenderness. Normal range of motion.      Cervical back: Normal range of motion and neck supple.      Comments: 1+ edema bilateral ankles.      Neurological: She is alert and oriented to person, place, and time.   Skin: Skin is warm and dry.         ED Course   Procedures  Labs Reviewed   B-TYPE NATRIURETIC PEPTIDE - Abnormal; Notable for the following components:       Result Value    BNP 3,340 (*)     All other components within normal limits   CBC W/ AUTO DIFFERENTIAL - Abnormal; Notable for the following components:    RBC 2.97 (*)     Hemoglobin 8.7 (*)     Hematocrit 26.7  (*)     RDW 20.6 (*)     nRBC 1 (*)     All other components within normal limits   COMPREHENSIVE METABOLIC PANEL - Abnormal; Notable for the following components:    Potassium 5.6 (*)     Calcium 8.3 (*)     Albumin 2.1 (*)     AST 72 (*)     All other components within normal limits   HEPATITIS C ANTIBODY    Narrative:     Release to patient->Immediate   SARS-COV-2 RDRP GENE     EKG Readings: (Independently Interpreted)   Rhythm: Normal Sinus Rhythm. Heart Rate: 70.   Widened QRS. Nonspecific ST changes. Similar to 05/19. No acute ischemia.        Imaging Results          X-Ray Chest AP Portable (Final result)  Result time 05/23/22 15:52:28    Final result by Messi Villanueva MD (05/23/22 15:52:28)                 Impression:      Stable findings of cardiomegaly with mild pulmonary vascular congestion.      Electronically signed by: Messi Villanueva MD  Date:    05/23/2022  Time:    15:52             Narrative:    EXAMINATION:  XR CHEST AP PORTABLE    CLINICAL HISTORY:  Chest Pain;    TECHNIQUE:  Single frontal view of the chest was performed.    COMPARISON:  05/11/2022.    FINDINGS:  There has been interval removal of the right-sided PICC line.  There is stable position of left-sided AICD.  The trachea is unremarkable.  The stable enlargement cardiac silhouette.  There is no evidence of free air beneath the hemidiaphragms.  The stable appearance of trace bilateral pleural effusions.  There is no evidence of a pneumothorax.  There is no evidence of pneumomediastinum.  There is mild pulmonary vascular congestion.  There is no focal consolidation.  There are degenerative changes in the osseous structures.                              X-Rays:   Independently Interpreted Readings:   Chest X-Ray: Moderate pulmonary edema. No focal infiltrate. No pneumothorax.      Medications   furosemide injection 40 mg (0 mg Intravenous Hold 5/23/22 1930)     Medical Decision Making:   History:   Old Medical Records: I decided to obtain old  medical records.  Independently Interpreted Test(s):   I have ordered and independently interpreted X-rays - see prior notes.  I have ordered and independently interpreted EKG Reading(s) - see prior notes  Clinical Tests:   Lab Tests: Reviewed and Ordered  Radiological Study: Ordered and Reviewed  Medical Tests: Ordered and Reviewed  Patient presents shortly after placement at nursing facility, as she was experiencing shortness of breath with movement since arriving there.  Denies fever.  She did have recent protracted stay with Colorectal Service at [facility], with colostomy placement, but is not having any acute GI symptoms.  On exam she did have bibasilar rales and peripheral edema but no JVD.  Good oxygenation.  Chest x-ray is consistent with exacerbation of congestive heart failure.  BNP is 3300, higher than prior values.  Potassium is elevated however we will be initiating Lasix therapy for the CHF which should help correct this as well.  Case discussed with hospitalist service to whom she will be admitted        Scribe Attestation:   Scribe #1: I performed the above scribed service and the documentation accurately describes the services I performed. I attest to the accuracy of the note.              Physician Attestation for Scribe: I, [facility], reviewed documentation as scribed in my presence, which is both accurate and complete.    Clinical Impression:   Final diagnoses:  [R06.02] SOB (shortness of breath)          ED Disposition Condition    Observation               Ajith Diaz II, MD  05/23/22 0999

## 2022-05-23 NOTE — TELEPHONE ENCOUNTER
Spoke with patient.   She is currently admitted at [facility] skilled nursing and rehab facility.  She is complaining of SOB.  Patient is currently on oxygen and says she still cannot breathe.   Since she is currently admitted to a facility.  Instructed her to call for her nurse to be evaluated.           ----- Message from Judie Franklin sent at 5/23/2022 12:03 PM CDT -----  Contact: pt  Pt is requesting call back , pt is out off breath and needs to speak with Dr ASAP .     She at [facility] , they are saying she is having panic attacks , pt says she's not . Please call . Pt is on oxygen and says she still cant breath.          Confirmed patient's contact info below:  Contact Name: Odette Hart  Phone Number: [phone]

## 2022-05-24 PROBLEM — E87.5 HYPERKALEMIA: Status: ACTIVE | Noted: 2022-01-01

## 2022-05-24 PROBLEM — I25.10 CAD (CORONARY ARTERY DISEASE): Status: ACTIVE | Noted: 2022-01-01

## 2022-05-24 PROBLEM — E83.42 HYPOMAGNESEMIA: Status: ACTIVE | Noted: 2022-01-01

## 2022-05-24 NOTE — CONSULTS
[facility]    Date of admission:  5/23/2022     Reason for Consult:    Heart failure    Chief Complaint    Chief Complaint   Patient presents with    Shortness of Breath     Reporting SOB, fatigue x4 days. Recently seen in ED for similar s/s. Extensive Hx.        HPI:    This 66-year-old female with a known ischemic cardiomyopathy was brought from her nursing with worsening shortness of breath.  Shortness of breath has been worsening for past couple weeks.  She has been in and out the hospital and long-term facilities over the past couple of months due to abdominal problems.  This past January, she underwent implantation of coronary stents after non ST segment elevation myocardial infarction.  She is usually followed by Dr. Arenas.  Efforts at diuresis have been somewhat limited by the patient's relative hypotension.    Medications  Current Facility-Administered Medications   Medication Dose Route Frequency Provider Last Rate Last Admin    aspirin EC tablet 81 mg  81 mg Oral Daily Michael Long NP   81 mg at 05/24/22 0942    ezetimibe tablet 10 mg  10 mg Oral QHS Michael Long NP        furosemide injection 40 mg  40 mg Intravenous Q12H Sanjuanita Briones MD   40 mg at 05/24/22 1231    heparin (porcine) injection 5,000 Units  5,000 Units Subcutaneous Q8H Michael Long NP   5,000 Units at 05/24/22 1330    linezolid tablet 600 mg  600 mg Oral Q12H Michael Long NP   600 mg at 05/24/22 0942    magnesium oxide tablet 400 mg  400 mg Oral Daily Sanjuanita Briones MD   400 mg at 05/24/22 1459    ondansetron injection 4 mg  4 mg Intravenous Q8H PRN Michael Long NP        prasugreL tablet 10 mg  10 mg Oral Daily Sanjuanita Briones MD   10 mg at 05/24/22 1459    sodium chloride 0.9% flush 10 mL  10 mL Intravenous PRN Michael Long NP          Prior to Admission medications    Medication Sig Start Date End Date Taking? Authorizing Provider   acetaminophen (TYLENOL) 500 MG  tablet Take 2 tablets (1,000 mg total) by mouth every 8 (eight) hours as needed for Pain. 1/26/22   Nghia Hunt MD   albuterol (PROVENTIL/VENTOLIN HFA) 90 mcg/actuation inhaler Inhale 2 puffs into the lungs every 6 (six) hours as needed for Wheezing. Rescue 8/2/21 8/2/22  Scout Smith MD   albuterol-ipratropium (DUO-NEB) 2.5 mg-0.5 mg/3 mL nebulizer solution Take 3 mLs by nebulization every 4 (four) hours as needed for Wheezing or Shortness of Breath. Rescue 5/19/22 5/19/23  Carola Buckley NP   ALPRAZolam (XANAX) 0.5 MG tablet Take 1 tablet (0.5 mg total) by mouth nightly as needed for Anxiety. 11/1/21   Braxton Barragan MD   ALPRAZolam (XANAX) 0.5 MG tablet Take 1 tablet (0.5 mg total) by mouth nightly as needed for Anxiety. 5/19/22 6/19/22  Carola Buckley NP   aspirin (ECOTRIN) 81 MG EC tablet Take 1 tablet (81 mg total) by mouth once daily. 1/26/22   Nghia Hunt MD   balsam peru-castor oiL Oint Apply topically 2 (two) times daily. 5/19/22   Carola Buckley NP   ezetimibe (ZETIA) 10 mg tablet Take 1 tablet (10 mg total) by mouth every evening. 2/28/22 2/28/23  Zeina Pelayo,    fluticasone propionate (FLONASE) 50 mcg/actuation nasal spray 1 spray (50 mcg total) by Each Nostril route once daily. 8/2/21   Scout Smith MD   furosemide (LASIX) 20 MG tablet Take 1 tablet (20 mg total) by mouth once daily. 5/20/22 5/20/23  Analy Garrison,    Lactobacillus rhamnosus GG (CULTURELLE) 10 billion cell capsule Take 1 capsule by mouth once daily.    Historical Provider   linezolid (ZYVOX) 600 mg Tab Take 1 tablet (600 mg total) by mouth every 12 (twelve) hours. for 4 days 5/20/22 5/24/22  Rafa Morin MD   meclizine (ANTIVERT) 12.5 mg tablet Take 1 tablet (12.5 mg total) by mouth 3 (three) times daily as needed for Dizziness. 5/20/22   Analy Garrison DO   melatonin (MELATIN) 3 mg tablet Take 2 tablets (6 mg total) by mouth nightly. 5/19/22   Carola Buckley NP   metoprolol  succinate (TOPROL-XL) 25 MG 24 hr tablet Take 1 tablet (25 mg total) by mouth once daily. 5/20/22 5/20/23  Carola Buckley NP   nitroGLYCERIN (NITROSTAT) 0.4 MG SL tablet Place 1 tablet (0.4 mg total) under the tongue every 5 (five) minutes as needed for Chest pain.  Patient not taking: Reported on 4/20/2022 1/26/22 1/26/23  Nghia Hunt MD   omeprazole (PRILOSEC) 40 MG capsule Take 1 capsule (40 mg total) by mouth once daily. 8/2/21   Scout Smith MD   oxyCODONE (ROXICODONE) 5 MG immediate release tablet Take 1 tablet (5 mg total) by mouth every 6 (six) hours as needed for Pain. 5/19/22   Carola Buckley NP   polyethylene glycol (GLYCOLAX) 17 gram PwPk Take 17 g by mouth once daily. 5/20/22   Carola Buckley NP   prasugreL (EFFIENT) 10 mg Tab Take 1 tablet (10 mg total) by mouth once daily. 1/27/22 4/27/22  Nghia Hunt MD   simethicone (MYLICON) 80 MG chewable tablet Take 80 mg by mouth every 8 (eight) hours as needed.    Historical Provider   spironolactone (ALDACTONE) 25 MG tablet Take 0.5 tablets (12.5 mg total) by mouth once daily. 5/20/22 5/20/23  Analy Garrison DO   carvediloL (COREG) 12.5 MG tablet Take 1 tablet (12.5 mg total) by mouth 2 (two) times daily with meals. 2/2/22 5/20/22  Freddy Arenas MD       History  Past Medical History:   Diagnosis Date    Acute coronary syndrome     Allergy     Arthritis     Cardiomyopathy     CHF (congestive heart failure)     Coronary artery disease     Coronary artery disease of native artery of native heart with stable angina pectoris 4/19/2021 1/24/2022: [facility]: Cath: LAD: Proximal stent patent. LCX: Proximal 80%. LCX: Dominant. Moderate disease. LCX: HEVER 2.75 x 18 mm. Distal dissection. HEVER 2.75 x 22 mm.     Diverticulitis     Diverticulosis     Familial hypercholesterolemia 1/22/2022    Former smoker 1/24/2022    Heart attack     Heart disease     History of myocardial infarction 1/24/2022    Hyperlipidemia      Hypertension     Hypertension 2022    ICD (implantable cardioverter-defibrillator) in place     Non-ST elevation myocardial infarction (NSTEMI) 2019     Past Surgical History:   Procedure Laterality Date    APPENDECTOMY N/A 2022    Procedure: APPENDECTOMY;  Surgeon: Rafa Cardozo MD;  Location: [facility] OR [facility];  Service: Colon and Rectal;  Laterality: N/A;    ATRIAL CARDIAC PACEMAKER INSERTION      CECECTOMY N/A 2022    Procedure: EXCISION, CECUM;  Surgeon: Rafa Cardozo MD;  Location: [facility] OR [facility];  Service: Colon and Rectal;  Laterality: N/A;     SECTION      CHOLECYSTECTOMY N/A 2022    Procedure: CHOLECYSTECTOMY;  Surgeon: Doug Epstein MD;  Location: [facility] OR [facility];  Service: General;  Laterality: N/A;    COLONOSCOPY N/A 2022    Procedure: COLONOSCOPY;  Surgeon: Rafa Cardozo MD;  Location: [facility] OR [facility];  Service: Colon and Rectal;  Laterality: N/A;    COLOSTOMY  2022    Procedure: CREATION, COLOSTOMY;  Surgeon: Rafa Cardozo MD;  [facility];  Service: Colon and Rectal;;    CORONARY STENT PLACEMENT      LEFT HEART CATHETERIZATION Left 2022    Procedure: CATHETERIZATION, HEART, LEFT;  Surgeon: Yohannes Cordova MD;  Location: [facility] CATH [facility];  Service: Cardiology;  Laterality: Left;     Social History     Socioeconomic History    Marital status:    Tobacco Use    Smoking status: Former Smoker     Packs/day: 0.50     Start date: 1976     Quit date: 2012     Years since quittin.4    Smokeless tobacco: Never Used   Substance and Sexual Activity    Alcohol use: No    Drug use: No     Family History   Problem Relation Age of Onset    Heart disease Mother     Emphysema Father     Heart attack Brother         Allergies  Review of patient's allergies indicates:   Allergen Reactions    Augmentin [amoxicillin-pot clavulanate] Swelling     Not allergic to amoxicillin just a derivative of augmentin     Lisinopril Other (See Comments)     Fluid around heart    Losartan Other (See Comments)     High potassium    Shellfish containing products Anaphylaxis     Pt.states she is allergic to SEAFOOD since the age of 13    Levaquin [levofloxacin] Other (See Comments)     Very bad joint pain    Clindamycin Palpitations     Chest pain       Review of Systems   Review of Systems   Constitutional: Negative for malaise/fatigue, weight gain and weight loss.   Eyes: Negative for visual disturbance.   Cardiovascular: Positive for chest pain, dyspnea on exertion and leg swelling. Negative for claudication, cyanosis, irregular heartbeat, near-syncope, orthopnea, palpitations, paroxysmal nocturnal dyspnea and syncope.   Respiratory: Positive for cough and shortness of breath. Negative for hemoptysis, sleep disturbances due to breathing and wheezing.    Hematologic/Lymphatic: Negative for bleeding problem. Does not bruise/bleed easily.   Skin: Negative for poor wound healing.   Musculoskeletal: Negative for muscle cramps and myalgias.   Gastrointestinal: Negative for abdominal pain, diarrhea, heartburn, hematemesis, hematochezia, melena, nausea and vomiting.   Genitourinary: Negative for hematuria and nocturia.   Neurological: Negative for excessive daytime sleepiness, dizziness, focal weakness, light-headedness and weakness.       Physical Exam    Temp:  [98.2 °F (36.8 °C)-98.6 °F (37 °C)]   Pulse:  [68-73]   Resp:  [18]   BP: ()/(40-62)   SpO2:  [96 %-100 %]    Wt Readings from Last 1 Encounters:   05/24/22 55.8 kg (123 lb)     Physical Exam  Vitals and nursing note reviewed.   Constitutional:       General: She is not in acute distress.     Appearance: She is not toxic-appearing or diaphoretic.   HENT:      Head: Normocephalic and atraumatic.      Mouth/Throat:      Lips: Pink.      Mouth: Mucous membranes are moist.   Eyes:      General: No scleral icterus.     Conjunctiva/sclera: Conjunctivae normal.   Neck:       Thyroid: No thyromegaly.      Vascular: JVD present. No carotid bruit.      Trachea: Trachea normal.   Cardiovascular:      Rate and Rhythm: Normal rate and regular rhythm.  No extrasystoles are present.     Chest Wall: PMI is not displaced.      Pulses:           Carotid pulses are 2+ on the right side and 2+ on the left side.       Radial pulses are 2+ on the right side and 2+ on the left side.      Heart sounds: S1 normal and S2 normal. No murmur heard.    No friction rub. Gallop present. S3 and S4 sounds present.   Pulmonary:      Effort: Pulmonary effort is normal. No accessory muscle usage or respiratory distress.      Breath sounds: Normal air entry. Examination of the right-lower field reveals rales. Examination of the left-lower field reveals rales. Rales present. No decreased breath sounds, wheezing or rhonchi.   Abdominal:      General: Bowel sounds are normal. There is no distension or abdominal bruit.      Palpations: Abdomen is soft. There is no hepatomegaly, splenomegaly or pulsatile mass.      Tenderness: There is no abdominal tenderness.   Musculoskeletal:         General: No tenderness or deformity.      Right lower leg: Edema present.      Left lower leg: Edema present.   Skin:     General: Skin is warm and dry.      Capillary Refill: Capillary refill takes less than 2 seconds.      Coloration: Skin is not cyanotic or pale.      Nails: There is no clubbing.   Neurological:      General: No focal deficit present.      Mental Status: She is alert and oriented to person, place, and time.   Psychiatric:         Attention and Perception: Attention normal.         Mood and Affect: Mood normal.         Speech: Speech normal.         Behavior: Behavior normal. Behavior is cooperative.         EKG  Sinus rhythm, right bundle-branch block, anterolateral wall infarct, age undetermined.  Not significantly changed compared to prior electrocardiograms.    Echocardiogram  · The left ventricle is severely  enlarged with eccentric hypertrophy and severely decreased systolic function.  · Severe left atrial enlargement.  · Grade III left ventricular diastolic dysfunction.  · Normal right ventricular size with normal right ventricular systolic function.  · Mild-to-moderate mitral regurgitation.  · Mild tricuspid regurgitation.  · There is mild pulmonary hypertension.    Labs  Recent Results (from the past 72 hour(s))   POCT COVID-19 Rapid Screening    Collection Time: 05/23/22  3:10 PM   Result Value Ref Range    POC Rapid COVID Negative Negative     Acceptable Yes    Hepatitis C Antibody    Collection Time: 05/23/22  5:43 PM   Result Value Ref Range    Hepatitis C Ab Negative Negative   B-Type natriuretic peptide (BNP)    Collection Time: 05/23/22  5:43 PM   Result Value Ref Range    BNP 3,340 (H) 0 - 99 pg/mL   CBC auto differential    Collection Time: 05/23/22  6:14 PM   Result Value Ref Range    WBC 5.25 3.90 - 12.70 K/uL    RBC 2.97 (L) 4.00 - 5.40 M/uL    Hemoglobin 8.7 (L) 12.0 - 16.0 g/dL    Hematocrit 26.7 (L) 37.0 - 48.5 %    MCV 90 82 - 98 fL    MCH 29.3 27.0 - 31.0 pg    MCHC 32.6 32.0 - 36.0 g/dL    RDW 20.6 (H) 11.5 - 14.5 %    Platelets 151 150 - 450 K/uL    MPV 11.1 9.2 - 12.9 fL    Immature Granulocytes 0.2 0.0 - 0.5 %    Gran # (ANC) 3.0 1.8 - 7.7 K/uL    Immature Grans (Abs) 0.01 0.00 - 0.04 K/uL    Lymph # 1.7 1.0 - 4.8 K/uL    Mono # 0.5 0.3 - 1.0 K/uL    Eos # 0.0 0.0 - 0.5 K/uL    Baso # 0.03 0.00 - 0.20 K/uL    nRBC 1 (A) 0 /100 WBC    Gran % 56.7 38.0 - 73.0 %    Lymph % 32.4 18.0 - 48.0 %    Mono % 10.1 4.0 - 15.0 %    Eosinophil % 0.0 0.0 - 8.0 %    Basophil % 0.6 0.0 - 1.9 %    Differential Method Automated    Comprehensive Metabolic Panel    Collection Time: 05/23/22  7:02 PM   Result Value Ref Range    Sodium 137 136 - 145 mmol/L    Potassium 5.6 (H) 3.5 - 5.1 mmol/L    Chloride 99 95 - 110 mmol/L    CO2 27 23 - 29 mmol/L    Glucose 93 70 - 110 mg/dL    BUN 19 8 - 23 mg/dL     Creatinine 0.7 0.5 - 1.4 mg/dL    Calcium 8.3 (L) 8.7 - 10.5 mg/dL    Total Protein 6.5 6.0 - 8.4 g/dL    Albumin 2.1 (L) 3.5 - 5.2 g/dL    Total Bilirubin 1.0 0.1 - 1.0 mg/dL    Alkaline Phosphatase 89 55 - 135 U/L    AST 72 (H) 10 - 40 U/L    ALT 41 10 - 44 U/L    Anion Gap 11 8 - 16 mmol/L    eGFR if African American >60 >60 mL/min/1.73 m^2    eGFR if non African American >60 >60 mL/min/1.73 m^2   Hemoglobin A1c    Collection Time: 05/24/22 12:18 AM   Result Value Ref Range    Hemoglobin A1C 5.0 4.0 - 5.6 %    Estimated Avg Glucose 97 68 - 131 mg/dL   Lactic acid, plasma    Collection Time: 05/24/22  2:13 AM   Result Value Ref Range    Lactate (Lactic Acid) 0.9 0.5 - 2.2 mmol/L   Magnesium    Collection Time: 05/24/22  2:13 AM   Result Value Ref Range    Magnesium 1.5 (L) 1.6 - 2.6 mg/dL   Phosphorus    Collection Time: 05/24/22  2:13 AM   Result Value Ref Range    Phosphorus 3.6 2.7 - 4.5 mg/dL   CBC auto differential    Collection Time: 05/24/22  2:13 AM   Result Value Ref Range    WBC 5.00 3.90 - 12.70 K/uL    RBC 3.19 (L) 4.00 - 5.40 M/uL    Hemoglobin 9.1 (L) 12.0 - 16.0 g/dL    Hematocrit 29.0 (L) 37.0 - 48.5 %    MCV 91 82 - 98 fL    MCH 28.5 27.0 - 31.0 pg    MCHC 31.4 (L) 32.0 - 36.0 g/dL    RDW 20.8 (H) 11.5 - 14.5 %    Platelets 188 150 - 450 K/uL    MPV 10.9 9.2 - 12.9 fL    Immature Granulocytes 0.4 0.0 - 0.5 %    Gran # (ANC) 2.4 1.8 - 7.7 K/uL    Immature Grans (Abs) 0.02 0.00 - 0.04 K/uL    Lymph # 2.0 1.0 - 4.8 K/uL    Mono # 0.6 0.3 - 1.0 K/uL    Eos # 0.0 0.0 - 0.5 K/uL    Baso # 0.03 0.00 - 0.20 K/uL    nRBC 1 (A) 0 /100 WBC    Gran % 47.2 38.0 - 73.0 %    Lymph % 40.4 18.0 - 48.0 %    Mono % 11.0 4.0 - 15.0 %    Eosinophil % 0.4 0.0 - 8.0 %    Basophil % 0.6 0.0 - 1.9 %    Differential Method Automated    Echo    Collection Time: 05/24/22  9:23 AM   Result Value Ref Range    AV mean gradient 5 mmHg    Ao peak spencer 1.63 m/s    Ao VTI 33.55 cm    IVRT 108.47 msec    IVS 0.85 0.6 - 1.1 cm    LA  size 5.46 cm    Left Atrium Major Axis 6.52 cm    Left Atrium Minor Axis 5.96 cm    LVIDd 7.08 (A) 3.5 - 6.0 cm    LVIDs 6.42 (A) 2.1 - 4.0 cm    LVOT diameter 1.86 cm    LVOT peak VTI 15.34 cm    Posterior Wall 0.87 0.6 - 1.1 cm    MV Peak A Thad 0.37 m/s    E wave deceleration time 159.98 msec    MV Peak E Thad 1.03 m/s    PV Peak S Thad 0.43 m/s    RA Major Axis 4.68 cm    RA Width 3.86 cm    Sinus 2.79 cm    TAPSE 1.70 cm    TR Max Thad 3.04 m/s    LA WIDTH 4.34 cm    PV PEAK VELOCITY 0.74 cm/s    MV stenosis pressure 1/2 time 39.52 ms    LV Diastolic Volume 261.99 mL    LV Systolic Volume 209.76 mL    LVOT peak thad 1.02 m/s    TDI LATERAL 0.03 m/s    TDI SEPTAL 0.02 m/s    Mr max thad 0.05 m/s    LV LATERAL E/E' RATIO 34.33 m/s    LV SEPTAL E/E' RATIO 51.50 m/s    FS 9 %    LA volume 125.43 cm3    LV mass 272.31 g    Left Ventricle Relative Wall Thickness 0.25 cm    AV valve area 1.24 cm2    AV Velocity Ratio 0.63     AV index (prosthetic) 0.46     MV valve area p 1/2 method 5.57 cm2    E/A ratio 2.78     Mean e' 0.03 m/s    LVOT area 2.7 cm2    LVOT stroke volume 41.66 cm3    AV peak gradient 11 mmHg    E/E' ratio 41.20 m/s    LV Systolic Volume Index 133.6 mL/m2    LV Diastolic Volume Index 166.87 mL/m2    LA Volume Index 79.9 mL/m2    LV Mass Index 173 g/m2    Triscuspid Valve Regurgitation Peak Gradient 37 mmHg    BSA 1.57 m2    LA Volume Index (Mod) 80.9 mL/m2    LA volume (mod) 127.00 cm3    Right Atrial Pressure (from IVC) 8 mmHg    EF 20 %    TV rest pulmonary artery pressure 45 mmHg   Basic Metabolic Panel    Collection Time: 05/24/22  2:42 PM   Result Value Ref Range    Sodium 140 136 - 145 mmol/L    Potassium 4.2 3.5 - 5.1 mmol/L    Chloride 97 95 - 110 mmol/L    CO2 31 (H) 23 - 29 mmol/L    Glucose 94 70 - 110 mg/dL    BUN 15 8 - 23 mg/dL    Creatinine 0.7 0.5 - 1.4 mg/dL    Calcium 8.7 8.7 - 10.5 mg/dL    Anion Gap 12 8 - 16 mmol/L    eGFR if African American >60 >60 mL/min/1.73 m^2    eGFR if non  African American >60 >60 mL/min/1.73 m^2        Imaging  X-Ray Chest 1 View    Result Date: 5/7/2022  EXAMINATION: XR CHEST 1 VIEW CLINICAL HISTORY: hypoxia; TECHNIQUE: Single frontal view of the chest was performed. COMPARISON: Radiograph 05/02/2022, 04/21/2022, 02/25/2022 . FINDINGS: Left chest wall cardiac ICD in place.  Right upper extremity PICC line tip projects over the SVC. Cardiomediastinal silhouette is stable.  Coronary artery stent versus vascular calcification, similar to prior.  Mediastinal structures are midline.  Lungs are symmetrically expanded.  Prominent interstitial opacities throughout both lungs, similar to slightly more pronounced when compared to the most recent prior radiograph.  No focal consolidation, pneumothorax, or significant pleural effusion.     Interstitial opacities throughout both lungs which may reflect edema or interstitial pneumonia. Electronically signed by: Rudolph Cotton Date:    05/07/2022 Time:    11:36    X-Ray Abdomen AP 1 View    Result Date: 5/2/2022  EXAMINATION: XR ABDOMEN AP 1 VIEW CLINICAL HISTORY: n/v; TECHNIQUE: AP View(s) of the abdomen was performed. COMPARISON: None FINDINGS: AP abdominal radiographic examination is submitted, 2 radiographs are submitted.  Intrathoracic findings are dictated separately on the chest radiograph performed the same date. There are a few air-filled bowel loops of the abdomen noted however there is no abnormal bowel distention.  The pattern is nonspecific.  The visualized osseous structures demonstrate chronic change.     Abdominal findings as above. Electronically signed by: Adriná Valdez Date:    05/02/2022 Time:    21:50    NM Lung Scan Perfusion Particulate    Result Date: 5/17/2022  EXAMINATION: NM LUNG SCAN PERFUSION CLINICAL HISTORY: Pulmonary embolism (PE) suspected, high prob;pe; TECHNIQUE: Following the IV administration of 5.2 mCi of [id number], multiple images of the thorax were obtained in various projections.  COMPARISON: Chest radiograph 05/01/2022, 05/07/2022 FINDINGS: Small wedge-shaped perfusion defect noted within the peripheral right lower anterior lung zone as well as possibly in the medial basal segment of the right lower lobe, best appreciated on the RPO view.     Perfusion defects in the right lung as noted above.  This represents a moderate suspicion for pulmonary embolism. Recommend further evaluation with lower extremity duplex or PE protocol chest CT.  Alternatively, correlative ventilation imaging may be performed in approximately 24 hours to allow for decay of perfusion radiotracer. This report was flagged in Epic as abnormal. I, Mehran Gottlieb MD, attest that I reviewed and interpreted the images. Electronically signed by resident: Dinesh Bah Date:    05/17/2022 Time:    15:28 Electronically signed by: Mehran Gottlieb Date:    05/17/2022 Time:    15:42    CT Abdomen Pelvis With Contrast    Result Date: 5/4/2022  EXAMINATION: CT ABDOMEN PELVIS WITH CONTRAST CLINICAL HISTORY: Abdominal abscess/infection suspected; TECHNIQUE: Axial images of the abdomen and pelvis were acquired  after the use of 75 cc [id number] IV contrast.  Coronal and sagittal reconstructions were also obtained COMPARISON: CT 04/21/2022 02/25/2022 FINDINGS: Heart: Enlarged.  No pericardial effusion.  Right heart transvenous leads. Lungs: Small-sized right and trace left pleural fluid associated with right compressive atelectasis.  Bilateral ground-glass and reticular opacities.  Peribronchial cuffing is noted Liver: Normal in size and contour.  No focal hepatic lesion. Gallbladder: Distended gallbladder with pericholecystic fluid.  No calcified gallstones. Bile Ducts: No evidence of dilated ducts. Pancreas: No mass or peripancreatic fat stranding. Spleen: Unremarkable. Stomach and duodenum: Unremarkable. Adrenals: Unremarkable. Kidneys/ Ureters: Normal in size and location. Left renal hypodensity, too small to characterize.  No hydronephrosis or  nephrolithiasis. No ureteral dilatation. Bladder: Persistent intraluminal air likely related to known colovesical fistula. Reproductive organs: Unremarkable. Bowel/Mesentery/peritoneum: Duodenal diverticulum.  Previously noted collection between the sigmoid colon and left lateral border of the bladder is not visualized in today's exam.  Significantly decreased collection between posterior vaginal cuff and anterior rectum with persistent fistula between the sigmoid colon and rectum (axial series 2, image 151 through 157).  Persistent sigmoid colon thickening associated with surrounding fat stranding. Lymph nodes: Prominent periaortic lymph nodes, likely reactive. Vasculature: No aneurysm. Extensive atherosclerotic disease associated with aortic ectasia. Abdominal wall:  Unremarkable. Bones: Degenerative spine changes.  No acute fracture. No suspicious osseous lesions.     1. Improving sigmoid colon diverticulitis. 2. Interval decrease in size of the collection between the anterior rectum and posterior vaginal cuff with persistent colorectal fistulous tract.  Previously noted collection along the anterior border of the sigmoid colon and left lateral border of the bladder is not seen in today's exam.  Air does remain in the bladder suggesting persistent fistula. 3. Distended gallbladder with pericholecystic fluid.  Could be seen in acute cholecystitis in the appropriate setting.  Further evaluation with ultrasound is recommended. 4. Persistent intraluminal air within the urinary bladder could be related to known colovesical fistula or catheterization. 5. Small-sized right and trace left pleural fluid associated with right compressive atelectasis. 6. Cardiomegaly associated with peribronchial cuffing and bilateral reticular and ground-glass opacities, favored to represent pulmonary edema. Electronically signed by resident: Lillie Leigh Date:    05/04/2022 Time:    15:18 Electronically signed by: Mehran Schulz MD  Date:    05/04/2022 Time:    15:56    X-Ray Chest AP Portable    Result Date: 5/23/2022  EXAMINATION: XR CHEST AP PORTABLE CLINICAL HISTORY: Chest Pain; TECHNIQUE: Single frontal view of the chest was performed. COMPARISON: 05/11/2022. FINDINGS: There has been interval removal of the right-sided PICC line.  There is stable position of left-sided AICD.  The trachea is unremarkable.  The stable enlargement cardiac silhouette.  There is no evidence of free air beneath the hemidiaphragms.  The stable appearance of trace bilateral pleural effusions.  There is no evidence of a pneumothorax.  There is no evidence of pneumomediastinum.  There is mild pulmonary vascular congestion.  There is no focal consolidation.  There are degenerative changes in the osseous structures.     Stable findings of cardiomegaly with mild pulmonary vascular congestion. Electronically signed by: Messi Villanueva MD Date:    05/23/2022 Time:    15:52    X-Ray Chest AP Portable    Result Date: 5/11/2022  EXAMINATION: XR CHEST AP PORTABLE CLINICAL HISTORY: eval for pulm edema;. TECHNIQUE: Single frontal portable view of the chest was performed. COMPARISON: Radiograph one view 05/07/2022 FINDINGS: Dual lead pacer defibrillator device with leads terminating in the right heart. Right-sided PICC line with tip terminating at the SVC. Lungs are symmetrically expanded with patchy opacities, primarily in a perihilar distribution, slightly improved compared to prior.  No pneumothorax or significant volume pleural fluid. The cardiac silhouette is stable in appearance.  Prominence of the perihilar vasculature. Osseous structures demonstrate degenerative changes.  No acute abnormality.     Stable predominantly perihilar interstitial opacities, slightly improved compared to prior study. Electronically signed by resident: Magdalena Navarrete Date:    05/11/2022 Time:    10:12 Electronically signed by: Denis Sepulveda MD Date:    05/11/2022 Time:    10:18    X-Ray Chest  AP Portable    Result Date: 5/2/2022  EXAMINATION: XR CHEST AP PORTABLE CLINICAL HISTORY: SOB; TECHNIQUE: Single frontal view of the chest was performed. COMPARISON: Chest radiograph April 21, 2022 FINDINGS: Single portable chest view is submitted.  Right-sided PICC line and cardiac pacemaker again noted.  The appearance of the cardiomediastinal silhouette is stable. There is mild prominence of the central pulmonary vasculature noted.  There is appearance of mild perihilar infiltrate, more prominent on the right.  Bilateral pattern of interstitial and patchy alveolar infiltrate noted, the appearance is similar to the prior study perhaps mild progression of radiographic findings.  These findings are seen superimposed on chronic change. There is no evidence for pneumothorax and there is no evidence for significant pleural effusion.  The visualized osseous structures demonstrate chronic change.     Intrathoracic findings as above. Electronically signed by: Adrián Valdez Date:    05/02/2022 Time:    21:52    US Lower Extremity Veins Bilateral    Result Date: 5/17/2022  EXAMINATION: US LOWER EXTREMITY VEINS BILATERAL CLINICAL HISTORY: Rule out DVT. VQ scan moderate suspicion for PE; Dizziness and giddiness TECHNIQUE: Duplex and color flow Doppler and dynamic compression was performed of the bilateral lower extremity veins was performed. COMPARISON: None FINDINGS: Right thigh veins: The common femoral, femoral, popliteal, upper greater saphenous, and deep femoral veins are patent and free of thrombus. The veins are normally compressible and have normal phasic flow and augmentation response. Right calf veins: The visualized calf veins are patent. Left thigh veins: The common femoral, femoral, popliteal, upper greater saphenous, and deep femoral veins are patent and free of thrombus. The veins are normally compressible and have normal phasic flow and augmentation response. Left calf veins: The visualized calf veins are  patent. Miscellaneous: Nonvascular complex cyst right popliteal fossa measuring 8.3 x 1.3 x 4.5 cm containing internal debris, suspect hemorrhagic popliteal fossa cyst.  Nonvascular irregular margined cyst left popliteal fossa measuring 4.7 x 1.6 x 2.0 cm, suspect partially ruptured popliteal fossa cyst.     No evidence of deep venous thrombosis in either lower extremity. Nonvascular complex cyst right popliteal fossa measuring 8.3 x 1.3 x 4.5 cm containing internal debris, suspect hemorrhagic popliteal fossa cyst. Nonvascular irregular margined cyst left popliteal fossa measuring 4.7 x 1.6 x 2.0 cm, suspect partially ruptured popliteal fossa cyst. Electronically signed by: Ryan Condon MD Date:    05/17/2022 Time:    22:51    Echo    Result Date: 5/24/2022  · The left ventricle is severely enlarged with eccentric hypertrophy and severely decreased systolic function. · Severe left atrial enlargement. · Grade III left ventricular diastolic dysfunction. · Normal right ventricular size with normal right ventricular systolic function. · Mild-to-moderate mitral regurgitation. · Mild tricuspid regurgitation. · There is mild pulmonary hypertension.      Echo    Result Date: 5/13/2022  · The left ventricle is severely enlarged with eccentric hypertrophy and severely decreased systolic function. The estimated ejection fraction is 20%. · Mild right ventricular enlargement with normal right ventricular systolic function. · Grade II left ventricular diastolic dysfunction. · Severe left atrial enlargement. · Mild-to-moderate mitral regurgitation. · Mild tricuspid regurgitation. · The estimated PA systolic pressure is 35 mmHg. · Normal central venous pressure (3 mmHg).      US Abdomen Limited    Result Date: 5/5/2022  EXAMINATION: US ABDOMEN LIMITED CLINICAL HISTORY: Acute cholecystits on CT scan;. TECHNIQUE: Limited ultrasound of the right upper quadrant of the abdomen including pancreas, liver, gallbladder, common bile duct  was performed. COMPARISON: CT abdomen pelvis 05/04/2022. FINDINGS: Liver: Upper normal in size, measuring 18.5 cm. Homogeneous echotexture. No focal hepatic lesions. Gallbladder: Gallstones are seen.  There is gallbladder wall thickening measuring up to 8 mm.  There is pericholecystic fluid.  No sonographic Rasmussen's sign or hypervascularity. Biliary system: The common duct is not dilated, measuring 1 mm.  No intrahepatic ductal dilatation. Spleen: Upper normal in size with a homogeneous echotexture, measuring 11.7 x 5 cm. Pancreas: The visualized portions of pancreas appear normal. Miscellaneous: Bilateral pleural effusions.  No hydronephrosis on either side.     Cholelithiasis, gallbladder wall thickening and pericholecystic fluid.  No sonographic Rasmussen's sign or wall hyperemia.  These findings are equivocal for acute cholecystitis.  Similar findings could be seen in setting of congestive heart failure or hepatic dysfunction.  If clinical concern persists for acute cholecystitis, HIDA scan may provide further information. Bilateral pleural effusions. This report was flagged in Epic as abnormal. Electronically signed by resident: Marixa Santillan MD Date:    05/05/2022 Time:    18:52 Electronically signed by: Doug Caballero MD Date:    05/05/2022 Time:    19:01    NM Lung Scan Ventilation Perfusion    Result Date: 5/18/2022  EXAMINATION: NM LUNG VENTILATION AND PERFUSION IMAGING CLINICAL HISTORY: Ventilation only, perfusion study performed 5/17;  Dizziness and giddiness TECHNIQUE: 42.3 mCi of Tc-99m-DTPA were placed in the nebulizer. Following the inhalation Tc-99m-DTPA in aerosol, multiple images of the thorax were obtained in various projections.  Ventilation imaging was performed for correlation with lung perfusion imaging performed the previous day. Lung perfusion imaging was not performed as part of this examination. COMPARISON: NM lung perfusion 05/17/2022 FINDINGS: Ventilation imaging is compromised by retention  of tracer in the upper aerodigestive tract and some central clumping of tracer due to poor inspiratory effort versus reactive airways disease. In comparison to the perfusion study, there is a smaller peripheral wedge-shaped ventilation defect in the region of the right lower lobe medial basal segment.  The anterior right lung perfusion defect appears to be matched with the ventilation defect best appreciated on the ZAMORA view.     Allowing for degraded ventilation images, there are mostly matched perfusion defects.  This represents a low probability of pulmonary embolism. Please note that only ventilation images were acquired.  There should be no charge for perfusion imaging. I, Mehran Gottlieb MD, attest that I reviewed and interpreted the images. Electronically signed by resident: Dinesh Bah Date:    05/18/2022 Time:    11:49 Electronically signed by: Mehran Gottlieb Date:    05/18/2022 Time:    13:21      Assessment    Acute on chronic systolic heart failure    Coronary atherosclerosis    Hypercholesterolemia    Plan and Discussion    Withhold her usual afterload reducing agents.  Allow her blood pressure to come up.  Continue with cautious diuresis.  Hold parameters will be ordered.       Vince Lopez MD

## 2022-05-24 NOTE — ASSESSMENT & PLAN NOTE
CXR-  Stable findings of cardiomegaly with mild pulmonary vascular congestion.   BNP- 3340    Consult Cardiology  Echo  Lasix BID, increase to 40 bid  Will check us lower extremities

## 2022-05-24 NOTE — PROGRESS NOTES
05/23/22 2154   Paul Risk Assessment   Sensory Perception 4-->no impairment   Moisture 3-->occasionally moist   Activity 2-->chairfast   Mobility 2-->very limited   Nutrition 2-->probably inadequate   Friction and Shear 1-->problem   Paul Score 14   [facility] ([facility])  Wound Care  Progress Note    Patient Name: Odette Hart  MRN: [id number]  Admission Date: 5/23/2022  Hospital Length of Stay: 0 days  Attending Physician: Sanjuanita Briones MD  Primary Care Provider: Braxton Barragan MD   Subjective & objective note cannot be loaded without a specified hospital service.    Assessment/Plan:         HAPI prevention, Paul <18 PIP orders placed .    Nemo Stover LPN  Wound Care  [facility] ([facility])

## 2022-05-24 NOTE — ASSESSMENT & PLAN NOTE
CXR-  Stable findings of cardiomegaly with mild pulmonary vascular congestion.   BNP- 3340    Consult Cardiology  Echo  Lasix BID, attempt gentle diuresis with hypotension

## 2022-05-24 NOTE — ED NOTES
MD aware pt's BP 93/52, still requesting RN hold Lasix.  No further actions requested.    Pt currently speaking in full sentences, respirations unlabored at this time, not reporting any pain.  Restroom and comfort needs addressed.  Call light at bedside.  Pt updated on POC.  Will continue to monitor.

## 2022-05-24 NOTE — PROGRESS NOTES
[facility] Medicine  Progress Note    Patient Name: Odette Hart  MRN: [id number]  Patient Class: OP- Observation   Admission Date: 5/23/2022  Length of Stay: 0 days  Attending Physician: Sanjuanita Briones MD  Primary Care Provider: Braxton Barragan MD        Subjective:     Principal Problem:Acute on chronic systolic heart failure        HPI:  The patient is a 66 y.o. female with a past medical history of HTN, HLD, CAD, CHF with EF of 20%, MI and ICD who presents with complaint of shortness of breath x 2 weeks. Patient explains that she had an appendectomy, colostomy, cecectomy, and cholecystectomy on 05/09. Since the surgery, she has been recovering in a rehab facility. She states that she has been experiencing shortness of breath on exertion since the surgery. No fever or cough. She reports nausea but no vomiting.  On initial exam, the patient with acute dyspnea with any exertion.  BNP greater than 3000 with peripheral edema.  CXR not very impressive for pulmonary edema.  She will be admitted for further management of her acute on chronic systolic heart failure and Cardiology consult.      Overview/Hospital Course:  No notes on file    Interval History: still c/o sob , little better than admit.    Review of Systems   Constitutional:  Negative for chills and fever.   HENT:  Negative for trouble swallowing.    Respiratory:  Positive for shortness of breath. Negative for cough.    Cardiovascular:  Positive for leg swelling. Negative for chest pain.   Gastrointestinal:  Positive for abdominal pain. Negative for blood in stool, nausea and vomiting.   Genitourinary:  Negative for dysuria and hematuria.   Skin:  Negative for rash.   Neurological:  Positive for weakness. Negative for headaches.   Psychiatric/Behavioral:  Negative for confusion.    Objective:     Vital Signs (Most Recent):  Temp: 98.2 °F (36.8 °C) (05/24/22 1234)  Pulse: 70 (05/24/22 1234)  Resp: 18 (05/24/22 1234)  BP: (!)  89/40 (05/24/22 1234)  SpO2: 100 % (05/24/22 1234)   Vital Signs (24h Range):  Temp:  [97.4 °F (36.3 °C)-98.2 °F (36.8 °C)] 98.2 °F (36.8 °C)  Pulse:  [64-91] 70  Resp:  [16-31] 18  SpO2:  [96 %-100 %] 100 %  BP: ()/(40-55) 89/40     Weight: 55.8 kg (123 lb)  Body mass index is 21.79 kg/m².    Intake/Output Summary (Last 24 hours) at 5/24/2022 1418  Last data filed at 5/24/2022 0300  Gross per 24 hour   Intake 240 ml   Output 865 ml   Net -625 ml      Physical Exam  Vitals reviewed.   Constitutional:       General: She is not in acute distress.     Appearance: She is well-developed.   HENT:      Head: Normocephalic and atraumatic.   Eyes:      Extraocular Movements: Extraocular movements intact.      Pupils: Pupils are equal, round, and reactive to light.   Cardiovascular:      Rate and Rhythm: Normal rate and regular rhythm.   Pulmonary:      Effort: Pulmonary effort is normal. No respiratory distress.      Comments: Bilateral crackles in back.  Abdominal:      General: Bowel sounds are normal. There is no distension.      Palpations: Abdomen is soft.      Tenderness: There is no abdominal tenderness.      Comments: Midline incision, ostomy with liquid stool   Musculoskeletal:         General: Swelling present. Normal range of motion.      Cervical back: Normal range of motion and neck supple.      Comments: Right leg more swollen than left, swelling in both feet   Skin:     General: Skin is warm.      Findings: No rash.   Neurological:      General: No focal deficit present.      Mental Status: She is alert and oriented to person, place, and time.   Psychiatric:         Mood and Affect: Mood normal.         Behavior: Behavior normal.       Significant Labs: All pertinent labs within the past 24 hours have been reviewed.    Significant Imaging: I have reviewed all pertinent imaging results/findings within the past 24 hours.      Assessment/Plan:      * Acute on chronic systolic heart failure  CXR-  Stable  findings of cardiomegaly with mild pulmonary vascular congestion.   BNP- 3340    Consult Cardiology  Echo  Lasix BID, increase to 40 bid  Will check us lower extremities        CAD (coronary artery disease)  s/p stent in 01/22  On asa and effient, per last cards note till 5/1/22  Will resume      Hypomagnesemia  Replace       Hyperkalemia  K- 5.6    Hold aldactone  Lasix BID  Check labs today      Hypotension (arterial)  Running low   hold antihypertensives      Colovesical fistula   On 5/9/22 she underwent cystoscopy with BL ureteral catheters, open sigmoid colon resection, appendectomy, and creation of end colostomy.  Diet as tolerated      Familial hypercholesterolemia  Continue Zetia        VTE Risk Mitigation (From admission, onward)         Ordered     heparin (porcine) injection 5,000 Units  Every 8 hours         05/24/22 0009     IP VTE HIGH RISK PATIENT  Once         05/24/22 0009     Place sequential compression device  Until discontinued         05/24/22 0009                Discharge Planning   SIVAN:      Code Status: Full Code   Is the patient medically ready for discharge?:     Reason for patient still in hospital (select all that apply): Patient trending condition and Treatment                     Sanjuanita Briones MD  Department of Hospital Medicine   [facility] ([facility])

## 2022-05-24 NOTE — SUBJECTIVE & OBJECTIVE
Past Medical History:   Diagnosis Date    Acute coronary syndrome     Allergy     Arthritis     Cardiomyopathy     CHF (congestive heart failure)     Coronary artery disease     Coronary artery disease of native artery of native heart with stable angina pectoris 2021: [facility]: Cath: LAD: Proximal stent patent. LCX: Proximal 80%. LCX: Dominant. Moderate disease. LCX: HEVER 2.75 x 18 mm. Distal dissection. HEVER 2.75 x 22 mm.     Diverticulitis     Diverticulosis     Familial hypercholesterolemia 2022    Former smoker 2022    Heart attack     Heart disease     History of myocardial infarction 2022    Hyperlipidemia     Hypertension     Hypertension 2022    ICD (implantable cardioverter-defibrillator) in place     Non-ST elevation myocardial infarction (NSTEMI) 2019       Past Surgical History:   Procedure Laterality Date    APPENDECTOMY N/A 2022    Procedure: APPENDECTOMY;  Surgeon: Rafa Cardozo MD;  [facility];  Service: Colon and Rectal;  Laterality: N/A;    ATRIAL CARDIAC PACEMAKER INSERTION      CECECTOMY N/A 2022    Procedure: EXCISION, CECUM;  Surgeon: Rafa Cardozo MD;  Location: [facility];  Service: Colon and Rectal;  Laterality: N/A;     SECTION      CHOLECYSTECTOMY N/A 2022    Procedure: CHOLECYSTECTOMY;  Surgeon: Doug Epstein MD;  Location: [facility];  Service: General;  Laterality: N/A;    COLONOSCOPY N/A 2022    Procedure: COLONOSCOPY;  Surgeon: Rafa Cardozo MD;  Location: [facility];  Service: Colon and Rectal;  Laterality: N/A;    COLOSTOMY  2022    Procedure: CREATION, COLOSTOMY;  Surgeon: Rafa Cardozo MD;  Location: [facility];  Service: Colon and Rectal;;    CORONARY STENT PLACEMENT      LEFT HEART CATHETERIZATION Left 2022    Procedure: CATHETERIZATION, HEART, LEFT;  Surgeon: Yohannes Cordova MD;  Location: [facility] CATH LAB;  Service: Cardiology;  Laterality: Left;        Review of patient's allergies indicates:   Allergen Reactions    Augmentin [amoxicillin-pot clavulanate] Swelling     Not allergic to amoxicillin just a derivative of augmentin    Lisinopril Other (See Comments)     Fluid around heart    Losartan Other (See Comments)     High potassium    Shellfish containing products Anaphylaxis     Pt.states she is allergic to SEAFOOD since the age of 13    Levaquin [levofloxacin] Other (See Comments)     Very bad joint pain    Clindamycin Palpitations     Chest pain       No current facility-administered medications on file prior to encounter.     Current Outpatient Medications on File Prior to Encounter   Medication Sig    acetaminophen (TYLENOL) 500 MG tablet Take 2 tablets (1,000 mg total) by mouth every 8 (eight) hours as needed for Pain.    albuterol (PROVENTIL/VENTOLIN HFA) 90 mcg/actuation inhaler Inhale 2 puffs into the lungs every 6 (six) hours as needed for Wheezing. Rescue    albuterol-ipratropium (DUO-NEB) 2.5 mg-0.5 mg/3 mL nebulizer solution Take 3 mLs by nebulization every 4 (four) hours as needed for Wheezing or Shortness of Breath. Rescue    ALPRAZolam (XANAX) 0.5 MG tablet Take 1 tablet (0.5 mg total) by mouth nightly as needed for Anxiety.    ALPRAZolam (XANAX) 0.5 MG tablet Take 1 tablet (0.5 mg total) by mouth nightly as needed for Anxiety.    aspirin (ECOTRIN) 81 MG EC tablet Take 1 tablet (81 mg total) by mouth once daily.    balsam peru-castor oiL Oint Apply topically 2 (two) times daily.    ezetimibe (ZETIA) 10 mg tablet Take 1 tablet (10 mg total) by mouth every evening.    fluticasone propionate (FLONASE) 50 mcg/actuation nasal spray 1 spray (50 mcg total) by Each Nostril route once daily.    furosemide (LASIX) 20 MG tablet Take 1 tablet (20 mg total) by mouth once daily.    Lactobacillus rhamnosus GG (CULTURELLE) 10 billion cell capsule Take 1 capsule by mouth once daily.    linezolid (ZYVOX) 600 mg Tab Take 1 tablet (600 mg total) by mouth every  12 (twelve) hours. for 4 days    meclizine (ANTIVERT) 12.5 mg tablet Take 1 tablet (12.5 mg total) by mouth 3 (three) times daily as needed for Dizziness.    melatonin (MELATIN) 3 mg tablet Take 2 tablets (6 mg total) by mouth nightly.    metoprolol succinate (TOPROL-XL) 25 MG 24 hr tablet Take 1 tablet (25 mg total) by mouth once daily.    nitroGLYCERIN (NITROSTAT) 0.4 MG SL tablet Place 1 tablet (0.4 mg total) under the tongue every 5 (five) minutes as needed for Chest pain. (Patient not taking: Reported on 2022)    omeprazole (PRILOSEC) 40 MG capsule Take 1 capsule (40 mg total) by mouth once daily.    oxyCODONE (ROXICODONE) 5 MG immediate release tablet Take 1 tablet (5 mg total) by mouth every 6 (six) hours as needed for Pain.    polyethylene glycol (GLYCOLAX) 17 gram PwPk Take 17 g by mouth once daily.    prasugreL (EFFIENT) 10 mg Tab Take 1 tablet (10 mg total) by mouth once daily.    simethicone (MYLICON) 80 MG chewable tablet Take 80 mg by mouth every 8 (eight) hours as needed.    spironolactone (ALDACTONE) 25 MG tablet Take 0.5 tablets (12.5 mg total) by mouth once daily.    [DISCONTINUED] carvediloL (COREG) 12.5 MG tablet Take 1 tablet (12.5 mg total) by mouth 2 (two) times daily with meals.     Family History       Problem Relation (Age of Onset)    Emphysema Father    Heart attack Brother    Heart disease Mother          Tobacco Use    Smoking status: Former Smoker     Packs/day: 0.50     Start date: 1976     Quit date: 2012     Years since quittin.4    Smokeless tobacco: Never Used   Substance and Sexual Activity    Alcohol use: No    Drug use: No    Sexual activity: Not on file     Review of Systems   Constitutional:  Positive for activity change, appetite change and fatigue. Negative for fever.   HENT:  Negative for congestion, ear pain, rhinorrhea and sinus pressure.    Eyes:  Negative for pain and discharge.   Respiratory:  Positive for cough, chest tightness and shortness of  breath. Negative for wheezing.    Cardiovascular:  Positive for leg swelling. Negative for chest pain.   Gastrointestinal:  Positive for abdominal pain. Negative for abdominal distention, diarrhea, nausea and vomiting.   Endocrine: Negative for cold intolerance and heat intolerance.   Genitourinary:  Negative for difficulty urinating, flank pain, frequency, hematuria and urgency.   Musculoskeletal:  Positive for myalgias. Negative for arthralgias and joint swelling.   Allergic/Immunologic: Negative for environmental allergies and food allergies.   Neurological:  Negative for dizziness, weakness, light-headedness and headaches.   Hematological:  Does not bruise/bleed easily.   Psychiatric/Behavioral:  Negative for agitation, behavioral problems and decreased concentration.    Objective:     Vital Signs (Most Recent):  Temp: 98.1 °F (36.7 °C) (05/23/22 2352)  Pulse: 65 (05/23/22 2352)  Resp: 17 (05/23/22 2352)  BP: (!) 100/54 (05/23/22 2352)  SpO2: 100 % (05/23/22 2352)   Vital Signs (24h Range):  Temp:  [97.4 °F (36.3 °C)-98.1 °F (36.7 °C)] 98.1 °F (36.7 °C)  Pulse:  [65-79] 65  Resp:  [17-31] 17  SpO2:  [96 %-100 %] 100 %  BP: ()/(50-55) 100/54     Weight: 53 kg (116 lb 13.5 oz)  Body mass index is 20.7 kg/m².    Physical Exam  Constitutional:       Appearance: Normal appearance. She is well-developed.   HENT:      Head: Normocephalic.   Eyes:      General:         Right eye: No discharge.         Left eye: No discharge.      Conjunctiva/sclera: Conjunctivae normal.   Cardiovascular:      Rate and Rhythm: Tachycardia present. Rhythm irregular.      Pulses:           Radial pulses are 2+ on the right side and 2+ on the left side.      Heart sounds: Normal heart sounds.   Pulmonary:      Effort: Pulmonary effort is normal. Tachypnea present. No respiratory distress.      Breath sounds: Examination of the right-lower field reveals rales. Examination of the left-lower field reveals rales. Rales present.    Abdominal:      General: Bowel sounds are decreased. There is no distension.      Palpations: Abdomen is soft.      Tenderness: There is generalized abdominal tenderness.   Musculoskeletal:         General: Normal range of motion.      Cervical back: Normal range of motion and neck supple.      Right lower le+ Pitting Edema present.      Left lower le+ Pitting Edema present.   Skin:     General: Skin is warm and dry.      Coloration: Skin is pale.   Neurological:      Mental Status: She is alert and oriented to person, place, and time.      GCS: GCS eye subscore is 4. GCS verbal subscore is 5. GCS motor subscore is 6.      Motor: Motor function is intact.   Psychiatric:         Mood and Affect: Mood is depressed.         Speech: Speech normal.         Behavior: Behavior is slowed.         Thought Content: Thought content normal.           Significant Labs: All pertinent labs within the past 24 hours have been reviewed.  CBC:   Recent Labs   Lab 22  1814   WBC 5.25   HGB 8.7*   HCT 26.7*        CMP:   Recent Labs   Lab 22  1902      K 5.6*   CL 99   CO2 27   GLU 93   BUN 19   CREATININE 0.7   CALCIUM 8.3*   PROT 6.5   ALBUMIN 2.1*   BILITOT 1.0   ALKPHOS 89   AST 72*   ALT 41   ANIONGAP 11   EGFRNONAA >60       Significant Imaging: I have reviewed all pertinent imaging results/findings within the past 24 hours.

## 2022-05-24 NOTE — CONSULTS
Food & Nutrition  Education    Diet Education: CHF diet edu   Time Spent: 15 minutes   Learners: patient       Nutrition Education provided with handouts: Heart Failure Nutrition Therapy; Shake the Salt Habit; Fluid Restriction Nutrition Therapy       Comments: RD consulted for FR + low Na diet edu. Pt resting quietly in bed and awaiting lunch upon admission. Pt explains has never had any issues with fluid/ breathing until her most recent surgery. Dicussed diets in full. Explains she was never a big salt eater- her biggest thing was eating potato chips. She states she uses a Greek Seasoning without salt to flavor foods. She occasionally adds salt to add flavor to her foods. Expalined other seasonings avaliable that would not affect her salt intake. Fluid Restriction explained- pt without questions and understood. Pt without any comments/questions/ concerns. Wound noted- see recommendation below.     Nutriton Recommendations:   1.Recommend Ezra BID mix with 8 fl oz to promote wound healing.     All questions and concerns answered. Dietitian's contact information provided.       Follow-Up:  5/31/2022    Please Re-consult as needed        Thanks!

## 2022-05-24 NOTE — HPI
"From H&P by Michael Long NP:  "The patient is a 66 y.o. female with a past medical history of HTN, HLD, CAD, CHF with EF of 20%, MI and ICD who presents with complaint of shortness of breath x 2 weeks. Patient explains that she had an appendectomy, colostomy, cecectomy, and cholecystectomy on 05/09. Since the surgery, she has been recovering in a rehab facility. She states that she has been experiencing shortness of breath on exertion since the surgery. No fever or cough. She reports nausea but no vomiting.  On initial exam, the patient with acute dyspnea with any exertion.  BNP greater than 3000 with peripheral edema.  CXR not very impressive for pulmonary edema.  She will be admitted for further management of her acute on chronic systolic heart failure and Cardiology consult."  "

## 2022-05-24 NOTE — SUBJECTIVE & OBJECTIVE
Interval History: still c/o sob , little better than admit.    Review of Systems   Constitutional:  Negative for chills and fever.   HENT:  Negative for trouble swallowing.    Respiratory:  Positive for shortness of breath. Negative for cough.    Cardiovascular:  Positive for leg swelling. Negative for chest pain.   Gastrointestinal:  Positive for abdominal pain. Negative for blood in stool, nausea and vomiting.   Genitourinary:  Negative for dysuria and hematuria.   Skin:  Negative for rash.   Neurological:  Positive for weakness. Negative for headaches.   Psychiatric/Behavioral:  Negative for confusion.    Objective:     Vital Signs (Most Recent):  Temp: 98.2 °F (36.8 °C) (05/24/22 1234)  Pulse: 70 (05/24/22 1234)  Resp: 18 (05/24/22 1234)  BP: (!) 89/40 (05/24/22 1234)  SpO2: 100 % (05/24/22 1234)   Vital Signs (24h Range):  Temp:  [97.4 °F (36.3 °C)-98.2 °F (36.8 °C)] 98.2 °F (36.8 °C)  Pulse:  [64-91] 70  Resp:  [16-31] 18  SpO2:  [96 %-100 %] 100 %  BP: ()/(40-55) 89/40     Weight: 55.8 kg (123 lb)  Body mass index is 21.79 kg/m².    Intake/Output Summary (Last 24 hours) at 5/24/2022 1418  Last data filed at 5/24/2022 0300  Gross per 24 hour   Intake 240 ml   Output 865 ml   Net -625 ml      Physical Exam  Vitals reviewed.   Constitutional:       General: She is not in acute distress.     Appearance: She is well-developed.   HENT:      Head: Normocephalic and atraumatic.   Eyes:      Extraocular Movements: Extraocular movements intact.      Pupils: Pupils are equal, round, and reactive to light.   Cardiovascular:      Rate and Rhythm: Normal rate and regular rhythm.   Pulmonary:      Effort: Pulmonary effort is normal. No respiratory distress.      Comments: Bilateral crackles in back.  Abdominal:      General: Bowel sounds are normal. There is no distension.      Palpations: Abdomen is soft.      Tenderness: There is no abdominal tenderness.      Comments: Midline incision, ostomy with liquid stool    Musculoskeletal:         General: Swelling present. Normal range of motion.      Cervical back: Normal range of motion and neck supple.      Comments: Right leg more swollen than left, swelling in both feet   Skin:     General: Skin is warm.      Findings: No rash.   Neurological:      General: No focal deficit present.      Mental Status: She is alert and oriented to person, place, and time.   Psychiatric:         Mood and Affect: Mood normal.         Behavior: Behavior normal.       Significant Labs: All pertinent labs within the past 24 hours have been reviewed.    Significant Imaging: I have reviewed all pertinent imaging results/findings within the past 24 hours.

## 2022-05-24 NOTE — H&P
[facility] Medicine  History & Physical    Patient Name: Odette Hart  MRN: [id number]  Patient Class: OP- Observation  Admission Date: 5/23/2022  Attending Physician: Elina Moncada MD   Primary Care Provider: Braxton Barragan MD         Patient information was obtained from patient, past medical records and ER records.     Subjective:     Principal Problem:Acute on chronic systolic heart failure    Chief Complaint:   Chief Complaint   Patient presents with    Shortness of Breath     Reporting SOB, fatigue x4 days. Recently seen in ED for similar s/s. Extensive Hx.         HPI: The patient is a 66 y.o. female with a past medical history of HTN, HLD, CAD, CHF with EF of 20%, MI and ICD who presents with complaint of shortness of breath x 2 weeks. Patient explains that she had an appendectomy, colostomy, cecectomy, and cholecystectomy on 05/09. Since the surgery, she has been recovering in a rehab facility. She states that she has been experiencing shortness of breath on exertion since the surgery. No fever or cough. She reports nausea but no vomiting.  On initial exam, the patient with acute dyspnea with any exertion.  BNP greater than 3000 with peripheral edema.  CXR not very impressive for pulmonary edema.  She will be admitted for further management of her acute on chronic systolic heart failure and Cardiology consult.      Past Medical History:   Diagnosis Date    Acute coronary syndrome     Allergy     Arthritis     Cardiomyopathy     CHF (congestive heart failure)     Coronary artery disease     Coronary artery disease of native artery of native heart with stable angina pectoris 4/19/2021 1/24/2022: [facility]: Cath: LAD: Proximal stent patent. LCX: Proximal 80%. LCX: Dominant. Moderate disease. LCX: HEVER 2.75 x 18 mm. Distal dissection. HEVER 2.75 x 22 mm.     Diverticulitis     Diverticulosis     Familial hypercholesterolemia 1/22/2022    Former smoker 1/24/2022     Heart attack     Heart disease     History of myocardial infarction 2022    Hyperlipidemia     Hypertension     Hypertension 2022    ICD (implantable cardioverter-defibrillator) in place     Non-ST elevation myocardial infarction (NSTEMI) 2019       Past Surgical History:   Procedure Laterality Date    APPENDECTOMY N/A 2022    Procedure: APPENDECTOMY;  Surgeon: Rafa Cardozo MD;  Location: [facility] OR [facility] FLR;  Service: Colon and Rectal;  Laterality: N/A;    ATRIAL CARDIAC PACEMAKER INSERTION      CECECTOMY N/A 2022    Procedure: EXCISION, CECUM;  Surgeon: Rafa Cardozo MD;  [facility];  Service: Colon and Rectal;  Laterality: N/A;     SECTION      CHOLECYSTECTOMY N/A 2022    Procedure: CHOLECYSTECTOMY;  Surgeon: Doug Epstein MD;  Location: [facility] OR [facility] FLR;  Service: General;  Laterality: N/A;    COLONOSCOPY N/A 2022    Procedure: COLONOSCOPY;  Surgeon: Rafa Cardozo MD;  Location: [facility] OR [facility] FLR;  Service: Colon and Rectal;  Laterality: N/A;    COLOSTOMY  2022    Procedure: CREATION, COLOSTOMY;  Surgeon: Rafa Cardozo MD;  [facility];  Service: Colon and Rectal;;    CORONARY STENT PLACEMENT      LEFT HEART CATHETERIZATION Left 2022    Procedure: CATHETERIZATION, HEART, LEFT;  Surgeon: Yohannes Cordova MD;  Location[facility];  Service: Cardiology;  Laterality: Left;       Review of patient's allergies indicates:   Allergen Reactions    Augmentin [amoxicillin-pot clavulanate] Swelling     Not allergic to amoxicillin just a derivative of augmentin    Lisinopril Other (See Comments)     Fluid around heart    Losartan Other (See Comments)     High potassium    Shellfish containing products Anaphylaxis     Pt.states she is allergic to SEAFOOD since the age of 13    Levaquin [levofloxacin] Other (See Comments)     Very bad joint pain    Clindamycin Palpitations     Chest pain       No  current facility-administered medications on file prior to encounter.     Current Outpatient Medications on File Prior to Encounter   Medication Sig    acetaminophen (TYLENOL) 500 MG tablet Take 2 tablets (1,000 mg total) by mouth every 8 (eight) hours as needed for Pain.    albuterol (PROVENTIL/VENTOLIN HFA) 90 mcg/actuation inhaler Inhale 2 puffs into the lungs every 6 (six) hours as needed for Wheezing. Rescue    albuterol-ipratropium (DUO-NEB) 2.5 mg-0.5 mg/3 mL nebulizer solution Take 3 mLs by nebulization every 4 (four) hours as needed for Wheezing or Shortness of Breath. Rescue    ALPRAZolam (XANAX) 0.5 MG tablet Take 1 tablet (0.5 mg total) by mouth nightly as needed for Anxiety.    ALPRAZolam (XANAX) 0.5 MG tablet Take 1 tablet (0.5 mg total) by mouth nightly as needed for Anxiety.    aspirin (ECOTRIN) 81 MG EC tablet Take 1 tablet (81 mg total) by mouth once daily.    balsam peru-castor oiL Oint Apply topically 2 (two) times daily.    ezetimibe (ZETIA) 10 mg tablet Take 1 tablet (10 mg total) by mouth every evening.    fluticasone propionate (FLONASE) 50 mcg/actuation nasal spray 1 spray (50 mcg total) by Each Nostril route once daily.    furosemide (LASIX) 20 MG tablet Take 1 tablet (20 mg total) by mouth once daily.    Lactobacillus rhamnosus GG (CULTURELLE) 10 billion cell capsule Take 1 capsule by mouth once daily.    linezolid (ZYVOX) 600 mg Tab Take 1 tablet (600 mg total) by mouth every 12 (twelve) hours. for 4 days    meclizine (ANTIVERT) 12.5 mg tablet Take 1 tablet (12.5 mg total) by mouth 3 (three) times daily as needed for Dizziness.    melatonin (MELATIN) 3 mg tablet Take 2 tablets (6 mg total) by mouth nightly.    metoprolol succinate (TOPROL-XL) 25 MG 24 hr tablet Take 1 tablet (25 mg total) by mouth once daily.    nitroGLYCERIN (NITROSTAT) 0.4 MG SL tablet Place 1 tablet (0.4 mg total) under the tongue every 5 (five) minutes as needed for Chest pain. (Patient not taking:  Reported on 2022)    omeprazole (PRILOSEC) 40 MG capsule Take 1 capsule (40 mg total) by mouth once daily.    oxyCODONE (ROXICODONE) 5 MG immediate release tablet Take 1 tablet (5 mg total) by mouth every 6 (six) hours as needed for Pain.    polyethylene glycol (GLYCOLAX) 17 gram PwPk Take 17 g by mouth once daily.    prasugreL (EFFIENT) 10 mg Tab Take 1 tablet (10 mg total) by mouth once daily.    simethicone (MYLICON) 80 MG chewable tablet Take 80 mg by mouth every 8 (eight) hours as needed.    spironolactone (ALDACTONE) 25 MG tablet Take 0.5 tablets (12.5 mg total) by mouth once daily.    [DISCONTINUED] carvediloL (COREG) 12.5 MG tablet Take 1 tablet (12.5 mg total) by mouth 2 (two) times daily with meals.     Family History       Problem Relation (Age of Onset)    Emphysema Father    Heart attack Brother    Heart disease Mother          Tobacco Use    Smoking status: Former Smoker     Packs/day: 0.50     Start date: 1976     Quit date: 2012     Years since quittin.4    Smokeless tobacco: Never Used   Substance and Sexual Activity    Alcohol use: No    Drug use: No    Sexual activity: Not on file     Review of Systems   Constitutional:  Positive for activity change, appetite change and fatigue. Negative for fever.   HENT:  Negative for congestion, ear pain, rhinorrhea and sinus pressure.    Eyes:  Negative for pain and discharge.   Respiratory:  Positive for cough, chest tightness and shortness of breath. Negative for wheezing.    Cardiovascular:  Positive for leg swelling. Negative for chest pain.   Gastrointestinal:  Positive for abdominal pain. Negative for abdominal distention, diarrhea, nausea and vomiting.   Endocrine: Negative for cold intolerance and heat intolerance.   Genitourinary:  Negative for difficulty urinating, flank pain, frequency, hematuria and urgency.   Musculoskeletal:  Positive for myalgias. Negative for arthralgias and joint swelling.   Allergic/Immunologic:  Negative for environmental allergies and food allergies.   Neurological:  Negative for dizziness, weakness, light-headedness and headaches.   Hematological:  Does not bruise/bleed easily.   Psychiatric/Behavioral:  Negative for agitation, behavioral problems and decreased concentration.    Objective:     Vital Signs (Most Recent):  Temp: 98.1 °F (36.7 °C) (22)  Pulse: 65 (22)  Resp: 17 (22)  BP: (!) 100/54 (22)  SpO2: 100 % (22)   Vital Signs (24h Range):  Temp:  [97.4 °F (36.3 °C)-98.1 °F (36.7 °C)] 98.1 °F (36.7 °C)  Pulse:  [65-79] 65  Resp:  [17-31] 17  SpO2:  [96 %-100 %] 100 %  BP: ()/(50-55) 100/54     Weight: 53 kg (116 lb 13.5 oz)  Body mass index is 20.7 kg/m².    Physical Exam  Constitutional:       Appearance: Normal appearance. She is well-developed.   HENT:      Head: Normocephalic.   Eyes:      General:         Right eye: No discharge.         Left eye: No discharge.      Conjunctiva/sclera: Conjunctivae normal.   Cardiovascular:      Rate and Rhythm: Tachycardia present. Rhythm irregular.      Pulses:           Radial pulses are 2+ on the right side and 2+ on the left side.      Heart sounds: Normal heart sounds.   Pulmonary:      Effort: Pulmonary effort is normal. Tachypnea present. No respiratory distress.      Breath sounds: Examination of the right-lower field reveals rales. Examination of the left-lower field reveals rales. Rales present.   Abdominal:      General: Bowel sounds are decreased. There is no distension.      Palpations: Abdomen is soft.      Tenderness: There is generalized abdominal tenderness.   Musculoskeletal:         General: Normal range of motion.      Cervical back: Normal range of motion and neck supple.      Right lower le+ Pitting Edema present.      Left lower le+ Pitting Edema present.   Skin:     General: Skin is warm and dry.      Coloration: Skin is pale.   Neurological:      Mental Status: She is  alert and oriented to person, place, and time.      GCS: GCS eye subscore is 4. GCS verbal subscore is 5. GCS motor subscore is 6.      Motor: Motor function is intact.   Psychiatric:         Mood and Affect: Mood is depressed.         Speech: Speech normal.         Behavior: Behavior is slowed.         Thought Content: Thought content normal.           Significant Labs: All pertinent labs within the past 24 hours have been reviewed.  CBC:   Recent Labs   Lab 05/23/22  1814   WBC 5.25   HGB 8.7*   HCT 26.7*        CMP:   Recent Labs   Lab 05/23/22  1902      K 5.6*   CL 99   CO2 27   GLU 93   BUN 19   CREATININE 0.7   CALCIUM 8.3*   PROT 6.5   ALBUMIN 2.1*   BILITOT 1.0   ALKPHOS 89   AST 72*   ALT 41   ANIONGAP 11   EGFRNONAA >60       Significant Imaging: I have reviewed all pertinent imaging results/findings within the past 24 hours.    Assessment/Plan:     * Acute on chronic systolic heart failure  CXR-  Stable findings of cardiomegaly with mild pulmonary vascular congestion.   BNP- 3340    Consult Cardiology  Echo  Lasix BID, attempt gentle diuresis with hypotension        Hyperkalemia  K- 5.6    Hold aldactone  Lasix BID  CMP daily      Hypotension (arterial)  SBP ; hold antihypertensives      Familial hypercholesterolemia  Continue Zetia        VTE Risk Mitigation (From admission, onward)         Ordered     heparin (porcine) injection 5,000 Units  Every 8 hours         05/24/22 0009     IP VTE HIGH RISK PATIENT  Once         05/24/22 0009     Place sequential compression device  Until discontinued         05/24/22 0009                   Michael Long NP  Department of Hospital Medicine   [facility] Surg [facility]

## 2022-05-24 NOTE — CONSULTS
[facility] - Med Surg [facility])  Wound Care    Patient Name:  Odette Hart   MRN:  [id number]  Date: 2022  Diagnosis: Acute on chronic systolic heart failure    History:     Past Medical History:   Diagnosis Date    Acute coronary syndrome     Allergy     Arthritis     Cardiomyopathy     CHF (congestive heart failure)     Coronary artery disease     Coronary artery disease of native artery of native heart with stable angina pectoris 2021: [facility]: Cath: LAD: Proximal stent patent. LCX: Proximal 80%. LCX: Dominant. Moderate disease. LCX: HEVER 2.75 x 18 mm. Distal dissection. HEVER 2.75 x 22 mm.     Diverticulitis     Diverticulosis     Familial hypercholesterolemia 2022    Former smoker 2022    Heart attack     Heart disease     History of myocardial infarction 2022    Hyperlipidemia     Hypertension     Hypertension 2022    ICD (implantable cardioverter-defibrillator) in place     Non-ST elevation myocardial infarction (NSTEMI) 2019       Social History     Socioeconomic History    Marital status:    Tobacco Use    Smoking status: Former Smoker     Packs/day: 0.50     Start date: 1976     Quit date: 2012     Years since quittin.4    Smokeless tobacco: Never Used   Substance and Sexual Activity    Alcohol use: No    Drug use: No       Precautions:     Allergies as of 2022 - Reviewed 2022   Allergen Reaction Noted    Augmentin [amoxicillin-pot clavulanate] Swelling 2018    Lisinopril Other (See Comments) 2018    Losartan Other (See Comments) 2018    Shellfish containing products Anaphylaxis 09/10/2018    Levaquin [levofloxacin] Other (See Comments) 2018    Clindamycin Palpitations 2019       WO Assessment Details/Treatment     Wound care consult received from RN for assessment of sacrum. Per chart review, patient is a 66 year old female with a past medical history of HTN, HLD,  CAD, CHF, MI and ICD who presents with complaint of shortness of breath x 2 weeks. Assessment and picture listed below.     Upon assessment noted a present on admit unstageable sacral pressure injury. Patient complaining of pain to sacrum. Recommend Triad ointment BID to promote autolytic debridement and moist wound healing. Nursing and MD team notified with recommendations. Orders placed. Nursing to maintain pressure injury prevention interventions. Will order a low air loss immerse surface. Wound care to assist with pt prn.      05/24/22 1107        Altered Skin Integrity 05/09/22 1630 Sacral spine Full thickness tissue loss. Base is covered by slough and/or eschar in the wound bed   Date First Assessed/Time First Assessed: 05/09/22 1630   Altered Skin Integrity Present on Admission: yes  Location: Sacral spine  Description of Altered Skin Integrity: Full thickness tissue loss. Base is covered by slough and/or eschar in the wound bed   Wound Image    Description of Altered Skin Integrity Full thickness tissue loss. Base is covered by slough and/or eschar in the wound bed   Dressing Appearance Moist drainage   Drainage Amount Small   Drainage Characteristics/Odor Serous;Tan;No odor   Appearance Pink;Yellow;Slough;Moist   Tissue loss description Full thickness   Periwound Area Dry;Intact   Wound Edges Undefined   Wound Length (cm) 7 cm   Wound Width (cm) 5 cm   Wound Surface Area (cm^2) 35 cm^2   Care Cleansed with:;Soap and water;Applied:;Skin Barrier  (Bathing wipes)   Dressing Other (comment)  (Left open to air due to incontience)           05/24/2022

## 2022-05-24 NOTE — ASSESSMENT & PLAN NOTE
On 5/9/22 she underwent cystoscopy with BL ureteral catheters, open sigmoid colon resection, appendectomy, and creation of end colostomy.  Diet as tolerated

## 2022-05-24 NOTE — PLAN OF CARE
05/24/22 1541   Discharge Assessment   Assessment Type Discharge Planning Assessment   Confirmed/corrected address, phone number and insurance Yes   Confirmed Demographics Correct on Facesheet   Source of Information patient   Does patient/caregiver understand observation status Yes   Communicated SIVAN with patient/caregiver Yes;Date not available/Unable to determine   Lives With facility resident   Facility Arrived From: [facility]   Do you expect to return to your current living situation? No   Prior to hospitilization cognitive status: Alert/Oriented   Current cognitive status: Alert/Oriented   Walking or Climbing Stairs Difficulty ambulation difficulty, requires equipment   Mobility Management Walker   Dressing/Bathing Difficulty bathing difficulty, requires equipment   Equipment Currently Used at Home walker, rolling   Readmission within 30 days? Yes   Patient currently being followed by outpatient case management? No   Do you take prescription medications? Yes   Is the patient taking medications as prescribed? yes   Are you on dialysis? No   Do you take coumadin? Yes   Discharge Plan A Skilled Nursing Facility   Discharge Barriers Identified None       Patient stated that she does not want to return to [facility] because they did not change her dressings and they did not help her.

## 2022-05-24 NOTE — PLAN OF CARE
Problem: Adult Inpatient Plan of Care  Goal: Absence of Hospital-Acquired Illness or Injury  Outcome: Ongoing, Progressing     Problem: Impaired Wound Healing  Goal: Optimal Wound Healing  Outcome: Ongoing, Progressing     Problem: Skin Injury Risk Increased  Goal: Skin Health and Integrity  Outcome: Ongoing, Progressing

## 2022-05-24 NOTE — ED NOTES
Furosemide ordered, pt. Imer blood pressure 86-50 and blood pressure on the monitor is 86/44, notified Rey Long NP and was advised to hold the lasix for now. Attempted to give report to the nurse for 316 but they floor just received 5 pts and would like a call back in 10 minutes.

## 2022-05-25 NOTE — PT/OT/SLP EVAL
Physical Therapy Evaluation and treatment    Patient Name:  Odette Hart   MRN:  [id number]    Recommendations:     Discharge Recommendations:  nursing facility, skilled (short stay SNF)   Discharge Equipment Recommendations: walker, rolling, bedside commode, shower chair, oxygen   Barriers to discharge: Inaccessible home and Decreased caregiver support    Assessment:     Odette Hart is a 66 y.o. female admitted with a medical diagnosis of Acute on chronic systolic heart failure - admitted from [facility] d/t LI. Previous hospitalization for colovesical fistula s/p appendectomy, colostomy, cecectomy, and cholecystectomy 05/09 with DC to SNF.  She presents with the following impairments/functional limitations:  weakness, impaired endurance, impaired self care skills, impaired functional mobilty, gait instability, impaired balance, decreased coordination, decreased safety awareness, pain, decreased ROM, impaired skin, impaired cardiopulmonary response to activity.    Patient evaluated by PT and goals established. Patient with multiple abdominal surgeries at beginning of month, dc to SNF but returned to ED d/t increasing SOB/LI. Patient reports fear of return of SOB but eager for OOB mobility. Performed mobility with CGA with RW - slow and guarded movements noted with minimal SOB during gait bout. PT will continue to follow and progress as tolerated. Rec for dc to short stay SNF - anticipate with intensive therapy efforts patient will be appropriate for transition to home within 2 weeks.     Rehab Prognosis: Good; patient would benefit from acute skilled PT services to address these deficits and reach maximum level of function.    Recent Surgery: * No surgery found *      Plan:     During this hospitalization, patient to be seen 5 x/week to address the identified rehab impairments via gait training, therapeutic activities, therapeutic exercises, neuromuscular re-education and progress toward the following  goals:    · Plan of Care Expires:  06/24/22    Subjective     Chief Complaint: Worried about being SOB, reports she c/o being unable to breath with previous therapists felt like she was not listened to  Patient/Family Comments/goals: Goal to be able to return to her PLOF including living (I) and driving; Patient agreeable to evaluation.  Pain/Comfort:  · Pain Rating 1: 0/10  · Location 1: abdomen  · Pain Addressed 1: Reposition, Pre-medicate for activity, Distraction, Cessation of Activity  · Pain Rating Post-Intervention 1: 0/10 (abd pain with mobility, appears comfortable at rest)    Patients cultural, spiritual, [employer] conflicts given the current situation: no    Living Environment:  · Pt lives alone in a single story home/trailer with 4 steps to enter and B handrail(s).    · Upon discharge, patient will have assistance from her granddaughter - pt unsure if able to provide level of assistance needed.  Prior level of function:  · Ambulation: Indep  · Immediately prior to admission, was using rollator (borrowed from friend)  · ADL's: Indep  · IADLs: Indep  · Manages her home  · Drives  ·  Equipment used at home: none.     Objective:     Communicated with JUAN Mckeon prior to session.  Patient found supine with colostomy, PureWick, telemetry, peripheral IV, oxygen, bed alarm  upon PT entry to room.    General Precautions: Standard, fall   Orthopedic Precautions:N/A (abd midline incision, LUQ ostomy)   Braces: N/A  Respiratory Status: Nasal cannula, flow 3 L/min    Patient donned red socks and gait belt for OOB mobility.    Exams:  · Cognition:   · Patient is oriented x4.  · Pt follows approximately 100% of one and two step commands.    · Mood: Pleasant and cooperative.   · Safety Awareness: Good  · Musculoskeletal:  · BMI: 21.05  · Posture:  Forward head, rounded shoulders, guarding of abd  · LE ROM/Strength:   · R ROM: WFL  · L ROM: WFL  · R Strength:   · Knee extension: 4/5  · Dorsiflexion: 4/5   · L Strength:    · Knee extension: 4/5  · Dorsiflexion: 4/5   · Neuromuscular:  · Sensation: Intact to light touch bilateral LEs. Denies paresthesias.   · Coordination/Tone/Reflexes: No impairments identified with functional mobility. No formal testing performed.   · Balance:   · Static sitting: Good  · Static standing: Fair, maintains BUE support on RW  · Dynamic standing: Fair, maintains BUE support on RW  · Visual-vestibular: No impairments identified with functional mobility. No formal testing performed.  · Integument: Visible skin intact and abd dressings intact  · Cardiopulmonary:  · O2 Requirements: 3 L NC  · Vital signs:   · Pre(supine): HR 70 SpO2 100%  · During(ambulation): HR 80 max SpO2 96%  · Post(sitting): HR 69 SpO2 100%  · Edema: None noted BLE    Functional Mobility:  · Bed Mobility:     · Supine to Sit: contact guard assistance  · Transfers:     · Sit to Stand:  contact guard assistance with rolling walker  · From EOB and chair  · Gait: x60 ft with RW and CGA.   · Slow gait with decreased jan, increased B stance time, and narrowed step widths.   · Stiff posture with guarding of abd.   · No overt LOB noted.     Therapeutic Activities and Exercises:  ·  Gait:  · Cueing for use of RW  · Encouraged standing rest breaks as needed d/t slight SOB at end of gait bout  PT educated patient re:   PT plan of care/role of PT  Safety with OOB mobility  Use of RW  Discharge disposition    Pt verbalized understanding       AM-PAC 6 CLICK MOBILITY  Total Score:17     Patient left up in chair with all lines intact, call button in reach, Tangela Mckeon notified, OT present and white board updated. No chair alarm box present in room, notified nursing of pt c/o meals not being delivered.    GOALS:   Multidisciplinary Problems     Physical Therapy Goals        Problem: Physical Therapy    Goal Priority Disciplines Outcome Goal Variances Interventions   Physical Therapy Goal     PT, PT/OT Ongoing, Progressing     Description: Goals to  be met by: 22    Patient will increase functional independence with mobility by performin. Supine<>sit with supervision without use of hospital bed features utilizing logroll technique.   2. Sit<>stand with supervision with LRAD.  3. Gait x 150 feet with supervision with LRAD.  4. Standing activity x5 min on appropriate supplemental O2 without LI.   5. Ascend/descend 4 step(s) with least restrictive assistive device and B HR with SBA.                    History:     Past Medical History:   Diagnosis Date    Acute coronary syndrome     Allergy     Arthritis     Cardiomyopathy     CHF (congestive heart failure)     Coronary artery disease     Coronary artery disease of native artery of native heart with stable angina pectoris 2021: [facility]: Cath: LAD: Proximal stent patent. LCX: Proximal 80%. LCX: Dominant. Moderate disease. LCX: HEVER 2.75 x 18 mm. Distal dissection. HEVER 2.75 x 22 mm.     Diverticulitis     Diverticulosis     Familial hypercholesterolemia 2022    Former smoker 2022    Heart attack     Heart disease     History of myocardial infarction 2022    Hyperlipidemia     Hypertension     Hypertension 2022    ICD (implantable cardioverter-defibrillator) in place     Non-ST elevation myocardial infarction (NSTEMI) 2019       Past Surgical History:   Procedure Laterality Date    APPENDECTOMY N/A 2022    Procedure: APPENDECTOMY;  Surgeon: Rafa Cardozo MD;  Location: [facility] OR [address] [place] [address];  Service: Colon and Rectal;  Laterality: N/A;    ATRIAL CARDIAC PACEMAKER INSERTION      CECECTOMY N/A 2022    Procedure: EXCISION, CECUM;  Surgeon: Rafa Cardozo MD;  [facility];  Service: Colon and Rectal;  Laterality: N/A;     SECTION      CHOLECYSTECTOMY N/A 2022    Procedure: CHOLECYSTECTOMY;  Surgeon: Doug Epstein MD;  Location: [facility] OR [address] [place] [address];  Service: General;  Laterality: N/A;    COLONOSCOPY N/A  5/9/2022    Procedure: COLONOSCOPY;  Surgeon: Rafa Cardozo MD;  [facility];  Service: Colon and Rectal;  Laterality: N/A;    COLOSTOMY  5/9/2022    Procedure: CREATION, COLOSTOMY;  Surgeon: Rafa Cardozo MD;  [facility] OR [facility] FLR;  Service: Colon and Rectal;;    CORONARY STENT PLACEMENT      LEFT HEART CATHETERIZATION Left 1/24/2022    Procedure: CATHETERIZATION, HEART, LEFT;  Surgeon: Yohannes Cordova MD;  Location: [facility] CATH LAB;  Service: Cardiology;  Laterality: Left;       Time Tracking:     PT Received On: 05/25/22  PT Start Time: 1217     PT Stop Time: 1242  PT Total Time (min): 25 min     Overlap with OT for portions of session due to complex nature of patient, tolerance for therapeutic modalities, and safety with mobility to decrease fall risk for patient and caregiver injury requiring two skilled therapists to provide interventions.    Billable Minutes: Evaluation 13 and Gait Training 8      05/25/2022

## 2022-05-25 NOTE — PT/OT/SLP EVAL
Occupational Therapy   Evaluation    Name: Odette Hart  MRN: [id number]  Admitting Diagnosis:  Acute on chronic systolic heart failure  Recent Surgery: * No surgery found *      Recommendations:     Discharge Recommendations:  Short stay SNF  Discharge Equipment Recommendations:   (TBD pending progress)  Barriers to discharge:  None    Assessment:     Odette Hart is a 66 y.o. female with a medical diagnosis of Acute on chronic systolic heart failure.  She presents with mild belly pain.  Performance deficits affecting function: impaired endurance, impaired self care skills, impaired functional mobilty, impaired balance, impaired cardiopulmonary response to activity.  Pt agreeable to participating in therapy upon arrival to room.   Pt demonstrates strength and ROM in (B) UE needed for ADLs that is WFL, and is able to perform sit <> stand transfer with CGA, RW.  Pt able to perform LB dressing with SBA-CGA, RW.  While ambulating in room with CGA, RW no instances of LOB noted.    Overall, pt tolerated therapy well and was excited to be sitting up in chair at end of session. Pt was recently at SNF, but PTA there pt reports being (I) with ADLs and mobility.  Pt would benefit from skilled OT services to address problems listed above and increase independence with ADLs.  Short stay SNF is recommended upon d/c from acute care to further address deficits and help pt improve overall functional independence.         Rehab Prognosis: Good; patient would benefit from acute skilled OT services to address these deficits and reach maximum level of function.       Plan:     Patient to be seen 5 x/week to address the above listed problems via self-care/home management, therapeutic activities, therapeutic exercises  · Plan of Care Expires: 06/24/22  · Plan of Care Reviewed with: patient    Subjective     Pt does not want to return to previous SNF!    Chief Complaint: Decreased endurance, incontinence  Patient/Family  "Comments/goals: "Get back to walking like I used to"    Occupational Profile:  Living Environment: Pt lives alone in mobile home, 4 SKYLER, R HR.  Bathroom has tub/shower.  Previous level of function:   -ADLs: Independent  -Mobility: Independent  -Other:  States she got sick in February and grew weak; since then has still been able to manage at home, but more challenging  Roles and Routines: Mother, grandmother, drives, enjoys walking (states she used to walk daily)  Equipment Used at Home:  none  Assistance upon Discharge: Granddaughter (just graduated high school) able to stay with pt; but pt unsure of how long or in what capacity    Pain/Comfort:  · Pain Rating 1: mild belly pain; not rated  · Pain Rating Post-Intervention 1: mild belly pain; not rated    Patients cultural, spiritual, Baptism conflicts given the current situation: no    Objective:     Communicated with: RN Kimberly) prior to session.  Patient found HOB elevated with colostomy, PureWick, telemetry, peripheral IV, oxygen upon OT entry to room.    General Precautions: Standard, fall   Orthopedic Precautions:N/A   Braces: N/A  Respiratory Status: Nasal cannula, flow 3 L/min    Occupational Performance:    Bed Mobility:    · Supine <> sit: CGA  · Scooting: CGA    Functional Mobility/Transfers:  · Sit <> Stand: CGA, RW x 1 trial from EOB  · Functional Mobility: Pt ambulated ~40 ft in room with CGA, RW.  · No LOB noted    Activities of Daily Living:  · Grooming: Set up for applying chap stick while supine with HOB elevated.  · Upper Body Dressing: SBA for donning gown while supine with HOB elevated.  · Lower Body Dressing:   · SBA-CGA for donning mesh underwear.    · Pt started task in seated position at EOB utilizing figure four dressing technique; SBA.  · Pt then stood with RW to pull underwear up and over bottom; CGA, RW     Cognitive/Visual Perceptual:  Cognitive/Psychosocial Skills:    -       Oriented to: Person, Place, Time and Situation   -      "  Follows Commands/attention: Follows multistep commands  -       Communication: clear/fluent  -       Memory: No Deficits noted  -       Safety awareness/insight to disability: intact   -       Mood/Affect/Coping skills/emotional control: Cooperative and Pleasant    Physical Exam:  Postural examination/scapula alignment:    -       No postural abnormalities identified  Skin integrity: Visible skin intact  Edema:  None noted  Sensation: -       Intact for (B) UE; reports feet feel swollen  Motor Planning: -       WNL  Dominant hand: -       Right  Upper Extremity Range of Motion: As observed through functional tasks  -       Right Upper Extremity: WFL  -       Left Upper Extremity: WFL  Upper Extremity Strength: As observed through functional tasks  -       Right Upper Extremity: WFL  -       Left Upper Extremity: WFL   Strength: WNL  Fine Motor Coordination: Intact  Gross motor coordination: WFL  Balance:  Sitting- Independent; Standing- CGA  Vision: Wears glasses; reports blurred vision    AMPAC 6 Click ADL:  AMPAC Total Score: 19    Treatment & Education:  *Pt educated on role of OT in acute care setting  *Pt performed grooming, UB dressing, and LB dressing task; endurance, ROM, and technique addressed  *Pt ambulated within room to address coordination, endurance, and balance needed for functional mobility   *POC reviewed with pt  Education:    Patient left up in chair with all lines intact, call button in reach and RN (Linda) notified    GOALS:   Multidisciplinary Problems     Occupational Therapy Goals        Problem: Occupational Therapy    Goal Priority Disciplines Outcome Interventions   Occupational Therapy Goal     OT, PT/OT Ongoing, Progressing    Description: Goals to be met by: 6/1/2022    Patient will increase functional independence with ADLs by performing:    UE Dressing with [place].  LE Dressing with [place].  Grooming while standing at sink with Supervision.  Toileting from toilet  with Supervision for hygiene and clothing management.   Toilet transfer to toilet with Supervision.                     History:     Past Medical History:   Diagnosis Date    Acute coronary syndrome     Allergy     Arthritis     Cardiomyopathy     CHF (congestive heart failure)     Coronary artery disease     Coronary artery disease of native artery of native heart with stable angina pectoris 2021: [facility]: Cath: LAD: Proximal stent patent. LCX: Proximal 80%. LCX: Dominant. Moderate disease. LCX: HEVER 2.75 x 18 mm. Distal dissection. HEVER 2.75 x 22 mm.     Diverticulitis     Diverticulosis     Familial hypercholesterolemia 2022    Former smoker 2022    Heart attack     Heart disease     History of myocardial infarction 2022    Hyperlipidemia     Hypertension     Hypertension 2022    ICD (implantable cardioverter-defibrillator) in place     Non-ST elevation myocardial infarction (NSTEMI) 2019         Past Surgical History:   Procedure Laterality Date    APPENDECTOMY N/A 2022    Procedure: APPENDECTOMY;  Surgeon: Rafa Cardozo MD;  Location: [facility] OR 2ND FLR;  Service: Colon and Rectal;  Laterality: N/A;    ATRIAL CARDIAC PACEMAKER INSERTION      CECECTOMY N/A 2022    Procedure: EXCISION, CECUM;  Surgeon: Rafa Cardozo MD;  [facility] OR [facility]ND [facility]LR;  Service: Colon and Rectal;  Laterality: N/A;     SECTION      CHOLECYSTECTOMY N/A 2022    Procedure: CHOLECYSTECTOMY;  Surgeon: Doug Epstein MD;  Location: [facility] OR 2ND FLR;  Service: General;  Laterality: N/A;    COLONOSCOPY N/A 2022    Procedure: COLONOSCOPY;  Surgeon: Rafa Cardozo MD;  Location: [facility] OR 2ND FLR;  Service: Colon and Rectal;  Laterality: N/A;    COLOSTOMY  2022    Procedure: CREATION, COLOSTOMY;  Surgeon: Rafa Cardozo MD;  Location: [facility] OR 2ND FLR;  Service: Colon and Rectal;;    CORONARY STENT PLACEMENT      LEFT HEART  CATHETERIZATION Left 1/24/2022    Procedure: CATHETERIZATION, HEART, LEFT;  Surgeon: Yohannes Cordova MD;  Location: [facility] CATH LAB;  Service: Cardiology;  Laterality: Left;       Time Tracking:     OT Date of Treatment: 05/25/22  OT Start Time: 1217  OT Stop Time: 1246  OT Total Time (min): 29 min    Billable Minutes:Evaluation 21  Self Care/Home Management 8  *Completed with PT    5/25/2022

## 2022-05-25 NOTE — PLAN OF CARE
Problem: Occupational Therapy  Goal: Occupational Therapy Goal  Description: Goals to be met by: 6/1/2022    Patient will increase functional independence with ADLs by performing:    UE Dressing with White.  LE Dressing with White.  Grooming while standing at sink with Supervision.  Toileting from toilet with Supervision for hygiene and clothing management.   Toilet transfer to toilet with Supervision.    Outcome: Ongoing, Progressing     OT evaluation complete and POC established.  Short stay SNF is recommended upon d/c from acute care to further address deficits and help pt improve overall functional independence.       WILL Hess  5/25/2022

## 2022-05-25 NOTE — ASSESSMENT & PLAN NOTE
CXR-  Stable findings of cardiomegaly with mild pulmonary vascular congestion.   BNP- 3340    Consult Cardiology, following  Echo  Lasix BID, increase to 40 bid  dvt negative  Continue diuresis  Results for orders placed during the hospital encounter of 05/23/22    Echo    Interpretation Summary  · The left ventricle is severely enlarged with eccentric hypertrophy and severely decreased systolic function.  · Severe left atrial enlargement.  · Grade III left ventricular diastolic dysfunction.  · Normal right ventricular size with normal right ventricular systolic function.  · Mild-to-moderate mitral regurgitation.  · Mild tricuspid regurgitation.  · There is mild pulmonary hypertension.

## 2022-05-25 NOTE — PLAN OF CARE
Problem: Physical Therapy  Goal: Physical Therapy Goal  Description: Goals to be met by: 22    Patient will increase functional independence with mobility by performin. Supine<>sit with supervision without use of hospital bed features utilizing logroll technique.   2. Sit<>stand with supervision with LRAD.  3. Gait x 150 feet with supervision with LRAD.  4. Standing activity x5 min on appropriate supplemental O2 without LI.   5. Ascend/descend 4 step(s) with least restrictive assistive device and B HR with SBA.   Outcome: Ongoing, Progressing     Patient evaluated by PT and goals established. Patient with multiple abdominal surgeries at beginning of month, dc to SNF but returned to ED d/t increasing SOB/LI. Patient reports fear of return of SOB but eager for OOB mobility. Performed mobility with CGA with RW - slow and guarded movements noted with minimal SOB during gait bout. PT will continue to follow and progress as tolerated. Rec for dc to SNF. Please see progress note for detailed plan of care and recommendations.

## 2022-05-25 NOTE — HOSPITAL COURSE
Patient was admitted for IV diuresis, which was difficult as she has relative hypotension.  She had a good improvement in breathing and edema and was weaned off oxygen supplementation.  She was seen by cardiology and 2D echo was done with summary below:    Summary    The left ventricle is severely enlarged with eccentric hypertrophy and severely decreased systolic function.  Severe left atrial enlargement.  Grade III left ventricular diastolic dysfunction.  Normal right ventricular size with normal right ventricular systolic function.  Mild-to-moderate mitral regurgitation.  Mild tricuspid regurgitation.  There is mild pulmonary hypertension.    She was continued on low dose carvedilol, furosemide and spironolactone while she was here.  She was not on an ARB or Ace due to documented intolerance to both.  Diuresis was difficult due to her relative hypotension, and on the day of discharge her furosemide was held as she felt weak (although still felt comfortable with being discharged).  She will need close follow up as outpatient.  Home health was ordered as well as DME (bedside commode, rolling walker, bath bench).

## 2022-05-25 NOTE — PROGRESS NOTES
[facility] Medicine  Progress Note    Patient Name: Odette Hart  MRN: [id number]  Patient Class: IP- Inpatient   Admission Date: 5/23/2022  Length of Stay: 0 days  Attending Physician: Sanjuanita Briones MD  Primary Care Provider: Braxton Barragan MD        Subjective:     Principal Problem:Acute on chronic systolic heart failure        HPI:  The patient is a 66 y.o. female with a past medical history of HTN, HLD, CAD, CHF with EF of 20%, MI and ICD who presents with complaint of shortness of breath x 2 weeks. Patient explains that she had an appendectomy, colostomy, cecectomy, and cholecystectomy on 05/09. Since the surgery, she has been recovering in a rehab facility. She states that she has been experiencing shortness of breath on exertion since the surgery. No fever or cough. She reports nausea but no vomiting.  On initial exam, the patient with acute dyspnea with any exertion.  BNP greater than 3000 with peripheral edema.  CXR not very impressive for pulmonary edema.  She will be admitted for further management of her acute on chronic systolic heart failure and Cardiology consult.      Overview/Hospital Course:  Continued on diuresis with slow improvement.      Interval History: improved  sob and swelling in legs, still very weak, able to eat some.    Review of Systems   Constitutional:  Negative for chills and fever.   HENT:  Negative for trouble swallowing.    Respiratory:  Positive for shortness of breath. Negative for cough.    Cardiovascular:  Positive for leg swelling. Negative for chest pain.   Gastrointestinal:  Positive for abdominal pain. Negative for blood in stool, nausea and vomiting.   Genitourinary:  Negative for dysuria and hematuria.   Skin:  Negative for rash.   Neurological:  Positive for weakness. Negative for headaches.   Psychiatric/Behavioral:  Negative for confusion.    Objective:     Vital Signs (Most Recent):  Temp: 97.5 °F (36.4 °C) (05/25/22  0829)  Pulse: 74 (05/25/22 1400)  Resp: 17 (05/25/22 0829)  BP: 113/63 (05/25/22 0829)  SpO2: 97 % (05/25/22 1300)   Vital Signs (24h Range):  Temp:  [97.5 °F (36.4 °C)-97.6 °F (36.4 °C)] 97.5 °F (36.4 °C)  Pulse:  [66-87] 74  Resp:  [16-18] 17  SpO2:  [97 %-100 %] 97 %  BP: ()/(52-63) 113/63     Weight: 53.9 kg (118 lb 13.3 oz)  Body mass index is 21.05 kg/m².    Intake/Output Summary (Last 24 hours) at 5/25/2022 1613  Last data filed at 5/25/2022 0800  Gross per 24 hour   Intake 240 ml   Output 900 ml   Net -660 ml        Physical Exam  Vitals reviewed.   Constitutional:       General: She is not in acute distress.     Appearance: She is well-developed.   HENT:      Head: Normocephalic and atraumatic.   Eyes:      Extraocular Movements: Extraocular movements intact.      Pupils: Pupils are equal, round, and reactive to light.   Cardiovascular:      Rate and Rhythm: Normal rate and regular rhythm.   Pulmonary:      Effort: Pulmonary effort is normal. No respiratory distress.      Comments: Bilateral crackles in back.  Abdominal:      General: Bowel sounds are normal. There is no distension.      Palpations: Abdomen is soft.      Tenderness: There is no abdominal tenderness.      Comments: Midline incision, ostomy with liquid stool   Musculoskeletal:         General: Swelling present. Normal range of motion.      Cervical back: Normal range of motion and neck supple.      Comments: Right leg more swollen than left, swelling in both feet much improved   Skin:     General: Skin is warm.      Findings: No rash.   Neurological:      General: No focal deficit present.      Mental Status: She is alert and oriented to person, place, and time.   Psychiatric:         Mood and Affect: Mood normal.         Behavior: Behavior normal.       Significant Labs: All pertinent labs within the past 24 hours have been reviewed.    Significant Imaging: I have reviewed all pertinent imaging results/findings within the past 24  hours.      Assessment/Plan:      * Acute on chronic systolic heart failure  CXR-  Stable findings of cardiomegaly with mild pulmonary vascular congestion.   BNP- 3340    Consult Cardiology, following  Echo  Lasix BID, increase to 40 bid  dvt negative  Continue diuresis  Results for orders placed during the hospital encounter of 05/23/22    Echo    Interpretation Summary  · The left ventricle is severely enlarged with eccentric hypertrophy and severely decreased systolic function.  · Severe left atrial enlargement.  · Grade III left ventricular diastolic dysfunction.  · Normal right ventricular size with normal right ventricular systolic function.  · Mild-to-moderate mitral regurgitation.  · Mild tricuspid regurgitation.  · There is mild pulmonary hypertension.      CAD (coronary artery disease)  s/p stent in 01/22  On asa and effient, per last cards note till 5/1/22  Will resume      Hypomagnesemia  Replace iv      Hyperkalemia  K- 5.6    Hold aldactone  Lasix BID  On low side now      Hypotension (arterial)  Running low   hold antihypertensives      Debility  Therapy eval      Colovesical fistula   On 5/9/22 she underwent cystoscopy with BL ureteral catheters, open sigmoid colon resection, appendectomy, and creation of end colostomy.  Diet as tolerated      Familial hypercholesterolemia  Continue Zetia        VTE Risk Mitigation (From admission, onward)         Ordered     heparin (porcine) injection 5,000 Units  Every 8 hours         05/24/22 0009     IP VTE HIGH RISK PATIENT  Once         05/24/22 0009     Place sequential compression device  Until discontinued         05/24/22 0009                Discharge Planning   SIVAN:      Code Status: Full Code   Is the patient medically ready for discharge?:     Reason for patient still in hospital (select all that apply): Patient trending condition and Treatment  Discharge Plan A: Skilled Nursing Facility                  Sanjuanita Briones MD  Department of Hospital  [facility] - Med Surg [facility])

## 2022-05-25 NOTE — ASSESSMENT & PLAN NOTE
K- 5.6    Hold aldactone  Lasix BID  On low side now     SEVERITY:- ABNORMAL ECG -

SINUS RHYTHM

LEFT ANTERIOR FASCICULAR BLOCK

LOW VOLTAGE IN FRONTAL LEADS

:

Confirmed by: Indira Kapoor 16-Mar-2020 00:30:23

## 2022-05-25 NOTE — PLAN OF CARE
Problem: Adult Inpatient Plan of Care  Goal: Plan of Care Review  Outcome: Ongoing, Progressing  Goal: Patient-Specific Goal (Individualized)  Outcome: Ongoing, Progressing  Goal: Absence of Hospital-Acquired Illness or Injury  Outcome: Ongoing, Progressing  Goal: Optimal Comfort and Wellbeing  Outcome: Ongoing, Progressing  Goal: Readiness for Transition of Care  Outcome: Ongoing, Progressing     Problem: Impaired Wound Healing  Goal: Optimal Wound Healing  Outcome: Ongoing, Progressing     Problem: Skin Injury Risk Increased  Goal: Skin Health and Integrity  Outcome: Ongoing, Progressing

## 2022-05-25 NOTE — SUBJECTIVE & OBJECTIVE
Interval History: improved  sob and swelling in legs, still very weak, able to eat some.    Review of Systems   Constitutional:  Negative for chills and fever.   HENT:  Negative for trouble swallowing.    Respiratory:  Positive for shortness of breath. Negative for cough.    Cardiovascular:  Positive for leg swelling. Negative for chest pain.   Gastrointestinal:  Positive for abdominal pain. Negative for blood in stool, nausea and vomiting.   Genitourinary:  Negative for dysuria and hematuria.   Skin:  Negative for rash.   Neurological:  Positive for weakness. Negative for headaches.   Psychiatric/Behavioral:  Negative for confusion.    Objective:     Vital Signs (Most Recent):  Temp: 97.5 °F (36.4 °C) (05/25/22 0829)  Pulse: 74 (05/25/22 1400)  Resp: 17 (05/25/22 0829)  BP: 113/63 (05/25/22 0829)  SpO2: 97 % (05/25/22 1300)   Vital Signs (24h Range):  Temp:  [97.5 °F (36.4 °C)-97.6 °F (36.4 °C)] 97.5 °F (36.4 °C)  Pulse:  [66-87] 74  Resp:  [16-18] 17  SpO2:  [97 %-100 %] 97 %  BP: ()/(52-63) 113/63     Weight: 53.9 kg (118 lb 13.3 oz)  Body mass index is 21.05 kg/m².    Intake/Output Summary (Last 24 hours) at 5/25/2022 1613  Last data filed at 5/25/2022 0800  Gross per 24 hour   Intake 240 ml   Output 900 ml   Net -660 ml        Physical Exam  Vitals reviewed.   Constitutional:       General: She is not in acute distress.     Appearance: She is well-developed.   HENT:      Head: Normocephalic and atraumatic.   Eyes:      Extraocular Movements: Extraocular movements intact.      Pupils: Pupils are equal, round, and reactive to light.   Cardiovascular:      Rate and Rhythm: Normal rate and regular rhythm.   Pulmonary:      Effort: Pulmonary effort is normal. No respiratory distress.      Comments: Bilateral crackles in back.  Abdominal:      General: Bowel sounds are normal. There is no distension.      Palpations: Abdomen is soft.      Tenderness: There is no abdominal tenderness.      Comments: Midline  incision, ostomy with liquid stool   Musculoskeletal:         General: Swelling present. Normal range of motion.      Cervical back: Normal range of motion and neck supple.      Comments: Right leg more swollen than left, swelling in both feet much improved   Skin:     General: Skin is warm.      Findings: No rash.   Neurological:      General: No focal deficit present.      Mental Status: She is alert and oriented to person, place, and time.   Psychiatric:         Mood and Affect: Mood normal.         Behavior: Behavior normal.       Significant Labs: All pertinent labs within the past 24 hours have been reviewed.    Significant Imaging: I have reviewed all pertinent imaging results/findings within the past 24 hours.

## 2022-05-26 PROBLEM — E87.5 HYPERKALEMIA: Status: RESOLVED | Noted: 2022-01-01 | Resolved: 2022-01-01

## 2022-05-26 NOTE — PT/OT/SLP PROGRESS
"Occupational Therapy   Treatment    Name: Odette Hart  MRN: [id number]  Admitting Diagnosis:  Acute on chronic systolic heart failure       Recommendations:     Discharge Recommendations: nursing facility, skilled  Discharge Equipment Recommendations:   (TBD pending progress; at this time RW, BSC, and O2 tank)  Barriers to discharge:  None    Assessment:     Odette Hart is a 66 y.o. female with a medical diagnosis of Acute on chronic systolic heart failure.  She presents with discomfort in stomach. Performance deficits affecting function are weakness, impaired endurance, impaired self care skills, impaired functional mobilty, gait instability, impaired balance, impaired cardiopulmonary response to activity, pain.  Upon arrival to room pt reported she had already completed therapy earlier in day.  With encouragement pt agreeable to participating in chair level activity.  Adequate ROM noted in (B) UE.    Overall, pt tolerated therapy well.  Mild SOB noted during session, but with rest pt able to recover.  Pt would continue to benefit from skilled OT services to address problems listed above and increase independence with ADLs.  SNF is recommended upon d/c from acute care to further address deficits and help pt improve overall functional independence.       Rehab Prognosis:  Good; patient would benefit from acute skilled OT services to address these deficits and reach maximum level of function.       Plan:     Patient to be seen 5 x/week to address the above listed problems via self-care/home management, therapeutic activities, therapeutic exercises  · Plan of Care Expires: 06/24/22  · Plan of Care Reviewed with: patient    Subjective     Pt reiterated she does not want to go back to previous SNF.  Pt stated, "I would rather go to retirement."    Pain/Comfort:  · Pain Rating 1:  (discomfort in stomach reported; unrated)  · Pain Rating Post-Intervention 1:  (discomfort in stomach remained consistent)    Objective: "     Communicated with: Patient found up in chair with colostomy, PureWick, peripheral IV, oxygen upon OT entry to room.    General Precautions: Standard, fall   Orthopedic Precautions:N/A   Braces: N/A  Respiratory Status: Nasal cannula, flow 3 L/min     Occupational Performance:     Bed Mobility:    · Not assessed 2* pt up in chair upon arrival.    Functional Mobility/Transfers:  · Sit <> Stand:  Not assessed 2 requested to remain in chair during session.  · Functional Mobility: To be assessed in upcoming session    Activities of Daily Living:  · To be assessed in upcoming sessions.      [facility] 6 Click ADL: 19    Treatment & Education:  *Pt educated on role of OT in acute care setting  *Pt performed UB exercise to provide stretch and promote increased endurance needed for ADLs: 2 sets x 10 reps per exercise; seated up in chair  -Shoulder flexion  -Shoulder abduction/adduction with scapular squeeze  *POC reviewed     Patient left up in chair with all lines intact, Pure wick in place, and call button in reachEducation:      GOALS:   Multidisciplinary Problems     Occupational Therapy Goals        Problem: Occupational Therapy    Goal Priority Disciplines Outcome Interventions   Occupational Therapy Goal     OT, PT/OT Ongoing, Progressing    Description: Goals to be met by: 6/1/2022    Patient will increase functional independence with ADLs by performing:    UE Dressing with [place].  LE Dressing with [place].  Grooming while standing at sink with Supervision.  Toileting from toilet with Supervision for hygiene and clothing management.   Toilet transfer to toilet with Supervision.                     Time Tracking:     OT Date of Treatment: 05/26/22  OT Start Time: 1353  OT Stop Time: 1409  OT Total Time (min): 16 min    Billable Minutes:Therapeutic Activity 16    OT/FROILAN: OT          5/26/2022

## 2022-05-26 NOTE — SUBJECTIVE & OBJECTIVE
DISPLAY PLAN FREE TEXT Interval History: improved  sob and swelling in legs,still very weak, able to eat small amounts at times.    Review of Systems   Constitutional:  Negative for chills and fever.   HENT:  Negative for trouble swallowing.    Respiratory:  Positive for shortness of breath. Negative for cough.    Cardiovascular:  Negative for chest pain and leg swelling.   Gastrointestinal:  Positive for abdominal pain. Negative for blood in stool, nausea and vomiting.   Genitourinary:  Negative for dysuria and hematuria.   Skin:  Negative for rash.   Neurological:  Positive for weakness. Negative for headaches.   Psychiatric/Behavioral:  Negative for confusion.    Objective:     Vital Signs (Most Recent):  Temp: 97.8 °F (36.6 °C) (05/26/22 1125)  Pulse: 84 (05/26/22 1125)  Resp: 17 (05/26/22 1245)  BP: (!) 164/110 (05/26/22 1125)  SpO2: 100 % (05/26/22 1125)   Vital Signs (24h Range):  Temp:  [97.6 °F (36.4 °C)-98.1 °F (36.7 °C)] 97.8 °F (36.6 °C)  Pulse:  [67-84] 84  Resp:  [17-20] 17  SpO2:  [99 %-100 %] 100 %  BP: ()/() 164/110     Weight: 53.9 kg (118 lb 13.3 oz)  Body mass index is 21.05 kg/m².    Intake/Output Summary (Last 24 hours) at 5/26/2022 1453  Last data filed at 5/26/2022 1000  Gross per 24 hour   Intake 60 ml   Output 2300 ml   Net -2240 ml        Physical Exam  Vitals reviewed.   Constitutional:       General: She is not in acute distress.     Appearance: She is well-developed. She is ill-appearing.   HENT:      Head: Normocephalic and atraumatic.   Eyes:      Extraocular Movements: Extraocular movements intact.      Pupils: Pupils are equal, round, and reactive to light.   Cardiovascular:      Rate and Rhythm: Normal rate and regular rhythm.   Pulmonary:      Effort: Pulmonary effort is normal. No respiratory distress.      Comments: Bilateral crackles in back improved.  Abdominal:      General: Bowel sounds are normal. There is no distension.      Palpations: Abdomen is soft.      Tenderness: There is no  abdominal tenderness.      Comments: Midline incision, ostomy with small amount of semi solid stool   Musculoskeletal:         General: Swelling present. Normal range of motion.      Cervical back: Normal range of motion and neck supple.      Comments: swelling in both feet much improved   Skin:     General: Skin is warm.   Neurological:      General: No focal deficit present.      Mental Status: She is alert and oriented to person, place, and time.   Psychiatric:         Mood and Affect: Mood normal.         Behavior: Behavior normal.       Significant Labs: All pertinent labs within the past 24 hours have been reviewed.    Significant Imaging: I have reviewed all pertinent imaging results/findings within the past 24 hours.   DISPLAY PLAN FREE TEXT

## 2022-05-26 NOTE — PROGRESS NOTES
[facility] - Med Surg [facility])  Wound Care    Patient Name:  Odette Hart   MRN:  [id number]  Date: 2022  Diagnosis: Acute on chronic systolic heart failure    History:     Past Medical History:   Diagnosis Date    Acute coronary syndrome     Allergy     Arthritis     Cardiomyopathy     CHF (congestive heart failure)     Coronary artery disease     Coronary artery disease of native artery of native heart with stable angina pectoris 2021: [facility]: Cath: LAD: Proximal stent patent. LCX: Proximal 80%. LCX: Dominant. Moderate disease. LCX: HEVER 2.75 x 18 mm. Distal dissection. HEVER 2.75 x 22 mm.     Diverticulitis     Diverticulosis     Familial hypercholesterolemia 2022    Former smoker 2022    Heart attack     Heart disease     History of myocardial infarction 2022    Hyperlipidemia     Hypertension     Hypertension 2022    ICD (implantable cardioverter-defibrillator) in place     Non-ST elevation myocardial infarction (NSTEMI) 2019       Social History     Socioeconomic History    Marital status:    Tobacco Use    Smoking status: Former Smoker     Packs/day: 0.50     Start date: 1976     Quit date: 2012     Years since quittin.4    Smokeless tobacco: Never Used   Substance and Sexual Activity    Alcohol use: No    Drug use: No       Precautions:     Allergies as of 2022 - Reviewed 2022   Allergen Reaction Noted    Augmentin [amoxicillin-pot clavulanate] Swelling 2018    Lisinopril Other (See Comments) 2018    Losartan Other (See Comments) 2018    Shellfish containing products Anaphylaxis 09/10/2018    Levaquin [levofloxacin] Other (See Comments) 2018    Clindamycin Palpitations 2019     66 y.o. female with a past medical history of HTN, HLD, CAD, CHF with EF of 20%, MI and ICD who presents with complaint of shortness of breath x 2 weeks. Patient explains that she had an  appendectomy, colostomy, cecectomy, and cholecystectomy on 05/09. Since the surgery, she has been recovering in a rehab facility. She states that she has been experiencing shortness of breath on exertion since the surgery. No fever or cough. She reports nausea but no vomiting.  On initial exam, the patient with acute dyspnea with any exertion. Admitted with exacerbation of acute on chronic CHF.  [facility] Assessment Details/Treatment     Patient expresses need for additional teaching opportunities in regards to her new ostomy.  She has a well budded stoma which measures 35mm oval . Peristomal skin is clear. Participated in lesson by cutting out pouch and assisting with placement. Had difficulty manipulating scissors and cutting out appliance.Will discuss with Dr Briones if Occupational therapy consult is warranted due to poor hand strength.  Will benefit from a precut system once post op swelling has abated and home supplies have been ordered.  Unsure of marquis ring application so she may not have received this instruction previously at [facility].  Will continue to reinforce teaching with prn visits.   Noted serosanguinous exudate along superior and distal midline incision. Cleaned and  redressed.     05/26/22 1500        Colostomy 05/09/22 1100 Descending/sigmoid LLQ   Placement Date/Time: 05/09/22 1100   Inserted by: MD  Colostomy Type: Descending/sigmoid  Location: LLQ   Stomal Appliance 1 piece;Changed;No Leakage  (Changed for patient teaching)   Stoma Appearance oval;red;rosebud appearance;protruding above skin level   Stoma Size (in) 35mm oval   Site Assessment Moist;Red;Protruding above skin level;Rosebud appearance   Peristomal Assessment Intact without breakdown   Accessories/Skin Care cleansed w/ water;skin barrier ring;skin sealant   Stoma Function flatus;stool   Treatment Bag change   Tolerance assisted with stoma care     05/26/2022

## 2022-05-26 NOTE — PROGRESS NOTES
[facility] Medicine  Progress Note    Patient Name: Odette Hart  MRN: [id number]  Patient Class: IP- Inpatient   Admission Date: 5/23/2022  Length of Stay: 1 days  Attending Physician: Sanjuanita Briones MD  Primary Care Provider: Braxton Barragan MD        Subjective:     Principal Problem:Acute on chronic systolic heart failure        HPI:  The patient is a 66 y.o. female with a past medical history of HTN, HLD, CAD, CHF with EF of 20%, MI and ICD who presents with complaint of shortness of breath x 2 weeks. Patient explains that she had an appendectomy, colostomy, cecectomy, and cholecystectomy on 05/09. Since the surgery, she has been recovering in a rehab facility. She states that she has been experiencing shortness of breath on exertion since the surgery. No fever or cough. She reports nausea but no vomiting.  On initial exam, the patient with acute dyspnea with any exertion.  BNP greater than 3000 with peripheral edema.  CXR not very impressive for pulmonary edema.  She will be admitted for further management of her acute on chronic systolic heart failure and Cardiology consult.      Overview/Hospital Course:  Continued on diuresis with slow improvement.      Interval History: improved  sob and swelling in legs,still very weak, able to eat small amounts at times.    Review of Systems   Constitutional:  Negative for chills and fever.   HENT:  Negative for trouble swallowing.    Respiratory:  Positive for shortness of breath. Negative for cough.    Cardiovascular:  Negative for chest pain and leg swelling.   Gastrointestinal:  Positive for abdominal pain. Negative for blood in stool, nausea and vomiting.   Genitourinary:  Negative for dysuria and hematuria.   Skin:  Negative for rash.   Neurological:  Positive for weakness. Negative for headaches.   Psychiatric/Behavioral:  Negative for confusion.    Objective:     Vital Signs (Most Recent):  Temp: 97.8 °F (36.6 °C) (05/26/22  1125)  Pulse: 84 (05/26/22 1125)  Resp: 17 (05/26/22 1245)  BP: (!) 164/110 (05/26/22 1125)  SpO2: 100 % (05/26/22 1125)   Vital Signs (24h Range):  Temp:  [97.6 °F (36.4 °C)-98.1 °F (36.7 °C)] 97.8 °F (36.6 °C)  Pulse:  [67-84] 84  Resp:  [17-20] 17  SpO2:  [99 %-100 %] 100 %  BP: ()/() 164/110     Weight: 53.9 kg (118 lb 13.3 oz)  Body mass index is 21.05 kg/m².    Intake/Output Summary (Last 24 hours) at 5/26/2022 1453  Last data filed at 5/26/2022 1000  Gross per 24 hour   Intake 60 ml   Output 2300 ml   Net -2240 ml        Physical Exam  Vitals reviewed.   Constitutional:       General: She is not in acute distress.     Appearance: She is well-developed. She is ill-appearing.   HENT:      Head: Normocephalic and atraumatic.   Eyes:      Extraocular Movements: Extraocular movements intact.      Pupils: Pupils are equal, round, and reactive to light.   Cardiovascular:      Rate and Rhythm: Normal rate and regular rhythm.   Pulmonary:      Effort: Pulmonary effort is normal. No respiratory distress.      Comments: Bilateral crackles in back improved.  Abdominal:      General: Bowel sounds are normal. There is no distension.      Palpations: Abdomen is soft.      Tenderness: There is no abdominal tenderness.      Comments: Midline incision, ostomy with small amount of semi solid stool   Musculoskeletal:         General: Swelling present. Normal range of motion.      Cervical back: Normal range of motion and neck supple.      Comments: swelling in both feet much improved   Skin:     General: Skin is warm.   Neurological:      General: No focal deficit present.      Mental Status: She is alert and oriented to person, place, and time.   Psychiatric:         Mood and Affect: Mood normal.         Behavior: Behavior normal.       Significant Labs: All pertinent labs within the past 24 hours have been reviewed.    Significant Imaging: I have reviewed all pertinent imaging results/findings within the past 24  hours.      Assessment/Plan:      * Acute on chronic systolic heart failure  CXR-  Stable findings of cardiomegaly with mild pulmonary vascular congestion.   BNP- 3340    Consult Cardiology, following  Lasix decreased to 40 mg oral with improved volume status  Also started aldactone 25 mg daily  dvt negative    Results for orders placed during the hospital encounter of 05/23/22    Echo    Interpretation Summary  · The left ventricle is severely enlarged with eccentric hypertrophy and severely decreased systolic function.  · Severe left atrial enlargement.  · Grade III left ventricular diastolic dysfunction.  · Normal right ventricular size with normal right ventricular systolic function.  · Mild-to-moderate mitral regurgitation.  · Mild tricuspid regurgitation.  · There is mild pulmonary hypertension.      CAD (coronary artery disease)  s/p stent in 01/22  On asa and effient, per last cards note till 5/1/22  Will resume      Hypomagnesemia  Replaced iv  improved      Hypotension (arterial)  Stable on meds      Debility  Therapy eval  Skilled suggested, does not want to go back to same facility    Anemia  Appears stable, will monitor      Colovesical fistula   On 5/9/22 she underwent cystoscopy with BL ureteral catheters, open sigmoid colon resection, appendectomy, and creation of end colostomy.  Diet as tolerated  Add miralax      Familial hypercholesterolemia  Continue Zetia        VTE Risk Mitigation (From admission, onward)         Ordered     heparin (porcine) injection 5,000 Units  Every 8 hours         05/24/22 0009     IP VTE HIGH RISK PATIENT  Once         05/24/22 0009     Place sequential compression device  Until discontinued         05/24/22 0009                Discharge Planning   SIVAN:      Code Status: Full Code   Is the patient medically ready for discharge?:     Reason for patient still in hospital (select all that apply): Patient trending condition and Treatment  Discharge Plan A: Skilled Nursing  Facility                  Sanjuanita Briones MD  Department of Hospital Medicine   [facility]

## 2022-05-26 NOTE — ASSESSMENT & PLAN NOTE
CXR-  Stable findings of cardiomegaly with mild pulmonary vascular congestion.   BNP- 3340    Consult Cardiology, following  Lasix decreased to 40 mg oral with improved volume status  Also started aldactone 25 mg daily  dvt negative    Results for orders placed during the hospital encounter of 05/23/22    Echo    Interpretation Summary  · The left ventricle is severely enlarged with eccentric hypertrophy and severely decreased systolic function.  · Severe left atrial enlargement.  · Grade III left ventricular diastolic dysfunction.  · Normal right ventricular size with normal right ventricular systolic function.  · Mild-to-moderate mitral regurgitation.  · Mild tricuspid regurgitation.  · There is mild pulmonary hypertension.

## 2022-05-26 NOTE — PROGRESS NOTES
[facility]    Admission date:  5/23/2022     Assessment    Acute on chronic systolic heart failure  With no documented intake, I/O are inaccurate.  But, seems to be responding well to current diuretic regimen.     Coronary atherosclerosis  Stable and free of angina    Plan and Discussion    Will convert to oral diuretics.  Continue to cautiously reintroduce afterload reducing agents    Subjective    Feels much better since admission.  Less dyspneic.  Walked around in her room yesterday with some initial orthostasis that resolved quickly.    Medications  Current Facility-Administered Medications   Medication Dose Route Frequency Provider Last Rate Last Admin    aspirin EC tablet 81 mg  81 mg Oral Daily Michael Long NP   81 mg at 05/26/22 0851    carvediloL tablet 3.125 mg  3.125 mg Oral BID Vince Lopez MD   3.125 mg at 05/26/22 0851    ezetimibe tablet 10 mg  10 mg Oral QHS Michael Long NP   10 mg at 05/25/22 2215    furosemide injection 40 mg  40 mg Intravenous Daily Vince Lopez MD   40 mg at 05/26/22 0851    heparin (porcine) injection 5,000 Units  5,000 Units Subcutaneous Q8H Michael Long NP   5,000 Units at 05/26/22 0559    magnesium sulfate 2g in water 50mL IVPB (premix)  2 g Intravenous Daily Vince Lopez MD   Stopped at 05/25/22 1533    ondansetron injection 4 mg  4 mg Intravenous Q8H PRN Michael Long NP        potassium chloride SA CR tablet 20 mEq  20 mEq Oral Once Sanjuanita Briones MD        prasugreL tablet 10 mg  10 mg Oral Daily Sanjuanita Briones MD   10 mg at 05/26/22 0851    sodium chloride 0.9% flush 10 mL  10 mL Intravenous PRN Michael Long NP        spironolactone tablet 25 mg  25 mg Oral Daily Vince Lopez MD   25 mg at 05/26/22 0851        Physical Exam    Temp:  [97.6 °F (36.4 °C)-98.1 °F (36.7 °C)]   Pulse:  [67-86]   Resp:  [17-20]   BP: ()/(53-56)   SpO2:  [97 %-100 %]    Wt Readings from Last 3 Encounters:   05/25/22  53.9 kg (118 lb 13.3 oz)   05/16/22 59.4 kg (130 lb 15.3 oz)   05/05/22 60.7 kg (133 lb 13.1 oz)     Physical Exam  Constitutional:       General: She is not in acute distress.  Neck:      Vascular: No JVD.   Cardiovascular:      Rate and Rhythm: Normal rate and regular rhythm.      Heart sounds: S1 normal and S2 normal. No murmur heard.    Gallop present. S4 sounds present. No S3 sounds.   Pulmonary:      Effort: Pulmonary effort is normal.      Breath sounds: Normal breath sounds and air entry.   Abdominal:      General: Bowel sounds are normal.      Palpations: Abdomen is soft. There is no hepatomegaly.      Tenderness: There is no abdominal tenderness.   Musculoskeletal:      Right lower leg: No edema.      Left lower leg: No edema.   Skin:     Coloration: Skin is not pale.   Neurological:      Mental Status: She is alert.   Psychiatric:         Behavior: Behavior is cooperative.         Telemetry  Sinus rhythm    Labs  Recent Results (from the past 24 hour(s))   Basic metabolic panel    Collection Time: 05/26/22  5:54 AM   Result Value Ref Range    Sodium 136 136 - 145 mmol/L    Potassium 4.1 3.5 - 5.1 mmol/L    Chloride 94 (L) 95 - 110 mmol/L    CO2 33 (H) 23 - 29 mmol/L    Glucose 96 70 - 110 mg/dL    BUN 10 8 - 23 mg/dL    Creatinine 0.6 0.5 - 1.4 mg/dL    Calcium 8.3 (L) 8.7 - 10.5 mg/dL    Anion Gap 9 8 - 16 mmol/L    eGFR if African American >60 >60 mL/min/1.73 m^2    eGFR if non African American >60 >60 mL/min/1.73 m^2   CBC auto differential    Collection Time: 05/26/22  5:54 AM   Result Value Ref Range    WBC 2.89 (L) 3.90 - 12.70 K/uL    RBC 3.67 (L) 4.00 - 5.40 M/uL    Hemoglobin 10.4 (L) 12.0 - 16.0 g/dL    Hematocrit 32.8 (L) 37.0 - 48.5 %    MCV 89 82 - 98 fL    MCH 28.3 27.0 - 31.0 pg    MCHC 31.7 (L) 32.0 - 36.0 g/dL    RDW 21.8 (H) 11.5 - 14.5 %    Platelets 245 150 - 450 K/uL    MPV 10.5 9.2 - 12.9 fL    Immature Granulocytes CANCELED 0.0 - 0.5 %    Immature Grans (Abs) CANCELED 0.00 - 0.04  K/uL    Lymph # CANCELED 1.0 - 4.8 K/uL    Mono # CANCELED 0.3 - 1.0 K/uL    Eos # CANCELED 0.0 - 0.5 K/uL    Baso # CANCELED 0.00 - 0.20 K/uL    nRBC 0 0 /100 WBC    Gran % 33.0 (L) 38.0 - 73.0 %    Lymph % 64.0 (H) 18.0 - 48.0 %    Mono % 3.0 (L) 4.0 - 15.0 %    Eosinophil % 0.0 0.0 - 8.0 %    Basophil % 0.0 0.0 - 1.9 %    Platelet Estimate Clumped (A)     Aniso Slight     Poik Slight     Poly Occasional     Hypo Occasional     Ovalocytes Occasional     Tear Drop Cells Occasional     Stomatocytes Present     Spherocytes Occasional     Toxic Granulation Present     Rouleaux Present (A)     Smudge Cells CANCELED     Differential Method Manual    Magnesium    Collection Time: 05/26/22  5:54 AM   Result Value Ref Range    Magnesium 1.9 1.6 - 2.6 mg/dL             Vince Lopez MD

## 2022-05-26 NOTE — PLAN OF CARE
Problem: Adult Inpatient Plan of Care  Goal: Plan of Care Review  Outcome: Ongoing, Progressing  Goal: Patient-Specific Goal (Individualized)  Outcome: Ongoing, Progressing  Goal: Absence of Hospital-Acquired Illness or Injury  Outcome: Ongoing, Progressing  Goal: Optimal Comfort and Wellbeing  Outcome: Ongoing, Progressing     Problem: Impaired Wound Healing  Goal: Optimal Wound Healing  Outcome: Ongoing, Progressing     Problem: Skin Injury Risk Increased  Goal: Skin Health and Integrity  Outcome: Ongoing, Progressing

## 2022-05-26 NOTE — ASSESSMENT & PLAN NOTE
On 5/9/22 she underwent cystoscopy with BL ureteral catheters, open sigmoid colon resection, appendectomy, and creation of end colostomy.  Diet as tolerated  Add miralax

## 2022-05-26 NOTE — PT/OT/SLP PROGRESS
"Physical Therapy Treatment    Patient Name:  Odette Hart   MRN:  [id number]    Recommendations:     Discharge Recommendations:  nursing facility, skilled (short stay)   Discharge Equipment Recommendations:  (TBD, though at this time using a RW and O2 tank)   Barriers to discharge: Decreased caregiver support    Assessment:     Odette Hart is a 66 y.o. female admitted with a medical diagnosis of Acute on chronic systolic heart failure.  She presents with the following impairments/functional limitations:  weakness, impaired endurance, impaired self care skills, impaired functional mobilty, gait instability, impaired balance, impaired cardiopulmonary response to activity ;pt with good mobility today, inc amb distance in room, less c/o's SOB.    Rehab Prognosis: Good; patient would benefit from acute skilled PT services to address these deficits and reach maximum level of function.    Recent Surgery: * No surgery found *      Plan:     During this hospitalization, patient to be seen 5 x/week to address the identified rehab impairments via gait training, therapeutic activities, therapeutic exercises, neuromuscular re-education and progress toward the following goals:    · Plan of Care Expires:  06/24/22    Subjective     Chief Complaint: urinary incontinence  Patient/Family Comments/goals: "I don't know why I can't tell when I need to go". Pt agreeable to session, requesting to go to restroom. Pt reports feeling better sitting up in chair.  Pain/Comfort:  · Pain Rating 1: 0/10  · Pain Rating Post-Intervention 1: 0/10      Objective:     Communicated with nurse prior to session.  Patient found HOB elevated with colostomy, PureWick, peripheral IV, oxygen upon PT entry to room.     General Precautions: Standard, fall   Orthopedic Precautions:N/A   Braces: N/A  Respiratory Status: Nasal cannula, flow 3 L/min     Functional Mobility:  · Bed Mobility:     · Supine to Sit: minimum assistance and for log rolling, " using bedrail  · Transfers:     · Sit to Stand:  contact guard assistance with rolling walker  · Gait: amb'd 25' and 50' w/ RW and CGA, O2@3L:100% following      AM-PAC 6 CLICK MOBILITY  Turning over in bed (including adjusting bedclothes, sheets and blankets)?: 3  Sitting down on and standing up from a chair with arms (e.g., wheelchair, bedside commode, etc.): 3  Moving from lying on back to sitting on the side of the bed?: 3  Moving to and from a bed to a chair (including a wheelchair)?: 3  Need to walk in hospital room?: 3  Climbing 3-5 steps with a railing?: 2  Basic Mobility Total Score: 17       Therapeutic Activities and Exercises:   PCT present to take BP during session, 164/110, nsg. Notified.  Pt perf'd commode t/f's in bathroom w/ CGA and use of railing on wall, Danii for donning /doffing briefs w/ pad for incontinence.     Patient left up in chair with all lines intact, call button in reach, nurse notified and Purewick replaced and pt sitting on pillow w/ 2 pillows supporting back in upright position..    GOALS:   Multidisciplinary Problems     Physical Therapy Goals        Problem: Physical Therapy    Goal Priority Disciplines Outcome Goal Variances Interventions   Physical Therapy Goal     PT, PT/OT Ongoing, Progressing     Description: Goals to be met by: 22    Patient will increase functional independence with mobility by performin. Supine<>sit with supervision without use of hospital bed features utilizing logroll technique.   2. Sit<>stand with supervision with LRAD.  3. Gait x 150 feet with supervision with LRAD.  4. Standing activity x5 min on appropriate supplemental O2 without LI.   5. Ascend/descend 4 step(s) with least restrictive assistive device and B HR with SBA.                    Time Tracking:     PT Received On: 22  PT Start Time: 1108     PT Stop Time: 1136  PT Total Time (min): 28 min     Billable Minutes: Gait Training 18 and Therapeutic Activity 10    Treatment  Type: Treatment  PT/PTA: PTA     PTA Visit Number: 1     05/26/2022

## 2022-05-27 PROBLEM — J96.11 CHRONIC HYPOXEMIC RESPIRATORY FAILURE: Status: ACTIVE | Noted: 2022-01-01

## 2022-05-27 PROBLEM — I95.9 HYPOTENSION (ARTERIAL): Status: RESOLVED | Noted: 2022-01-01 | Resolved: 2022-01-01

## 2022-05-27 NOTE — PLAN OF CARE
Spoke with patient about SNF placement, patient stated that she would like to go to [facility] and stated that if she can not get into [facility] she would like to go home with home health and home oxygen. Patient stated that she was at [facility] before she had surgery.

## 2022-05-27 NOTE — PT/OT/SLP PROGRESS
Physical Therapy Treatment    Patient Name:  Odette Hart   MRN:  [id number]    Recommendations:     Discharge Recommendations:  nursing facility, skilled (short stay)   Discharge Equipment Recommendations:  (TBD, though at this time using a RW and O2 tank)   Barriers to discharge: Decreased caregiver support    Assessment:     Odette Hart is a 66 y.o. female admitted with a medical diagnosis of Acute on chronic systolic heart failure.  She presents with the following impairments/functional limitations:  weakness, impaired self care skills, impaired functional mobilty, gait instability, impaired endurance, pain .    Pt seated in bedside chair upon arrival. Sit<>stand with RW CGA for steadying. Pt ambulated 25ft x2 (to and from bathroom) with RW & CGA on RA. Pt c/o dizziness with gait, SPO2 99%, HR 81 on RA with standing. Pt performed LAQ, HF & AP x15 ea LE with cues for technique.    Rehab Prognosis: Good; patient would benefit from acute skilled PT services to address these deficits and reach maximum level of function.    Recent Surgery: * No surgery found *      Plan:     During this hospitalization, patient to be seen 5 x/week to address the identified rehab impairments via gait training, therapeutic activities, therapeutic exercises, neuromuscular re-education and progress toward the following goals:    · Plan of Care Expires:  06/24/22    Subjective     Chief Complaint: pain in bottom with sitting, assisted with  Pillow positioning to comfort  Patient/Family Comments/goals: pt agreeable to therapy session  Pain/Comfort:  · Pain Rating 1: 5/10  · Location 1: coccyx  · Pain Addressed 1: Reposition, Distraction, Cessation of Activity      Objective:     Communicated with nurse liao prior to session.  Patient found up in chair with colostomy, PureWick, peripheral IV, oxygen upon PT entry to room.     General Precautions: Standard, fall   Orthopedic Precautions:N/A   Braces:    Respiratory Status:  Nasal cannula, flow 2 L/min   100%on 2LO2, 98-99% on RA during gait  Functional Mobility:  Transfers- Pt seated in bedside chair upon arrival. Sit<>stand with RW CGA for steadying.   Gait- Pt ambulated 25ft x2 (to and from bathroom) with RW & CGA on RA. Pt c/o dizziness with gait, SPO2 99%, HR 81 on RA with standing.       AM-PAC 6 CLICK MOBILITY  Turning over in bed (including adjusting bedclothes, sheets and blankets)?: 3  Sitting down on and standing up from a chair with arms (e.g., wheelchair, bedside commode, etc.): 3  Moving from lying on back to sitting on the side of the bed?: 3  Moving to and from a bed to a chair (including a wheelchair)?: 3  Need to walk in hospital room?: 3  Climbing 3-5 steps with a railing?: 2  Basic Mobility Total Score: 17       Therapeutic Activities and Exercises:  Pt performed LAQ, HF & AP x15 ea LE with cues for technique.    Patient left up in chair with all lines intact, call button in reach and chair alarm on..    GOALS:   Multidisciplinary Problems     Physical Therapy Goals        Problem: Physical Therapy    Goal Priority Disciplines Outcome Goal Variances Interventions   Physical Therapy Goal     PT, PT/OT Ongoing, Progressing     Description: Goals to be met by: 22    Patient will increase functional independence with mobility by performin. Supine<>sit with supervision without use of hospital bed features utilizing logroll technique.   2. Sit<>stand with supervision with LRAD.  3. Gait x 150 feet with supervision with LRAD.  4. Standing activity x5 min on appropriate supplemental O2 without LI.   5. Ascend/descend 4 step(s) with least restrictive assistive device and B HR with SBA.                    Time Tracking:     PT Received On: 22  PT Start Time: 1005     PT Stop Time: 1028  PT Total Time (min): 23 min     Billable Minutes: Gait Training 13 and Therapeutic Exercise 10    Treatment Type: Treatment  PT/PTA: PTA     PTA Visit Number: 2      05/27/2022

## 2022-05-27 NOTE — ASSESSMENT & PLAN NOTE
CXR-  Stable findings of cardiomegaly with mild pulmonary vascular congestion.   BNP- 3340    Consult Cardiology, following  Lasix decreased to 40 mg oral with improved volume status  Also started aldactone 25 mg daily  dvt negative  Improved fluid status    Results for orders placed during the hospital encounter of 05/23/22    Echo    Interpretation Summary  · The left ventricle is severely enlarged with eccentric hypertrophy and severely decreased systolic function.  · Severe left atrial enlargement.  · Grade III left ventricular diastolic dysfunction.  · Normal right ventricular size with normal right ventricular systolic function.  · Mild-to-moderate mitral regurgitation.  · Mild tricuspid regurgitation.  · There is mild pulmonary hypertension.

## 2022-05-27 NOTE — ASSESSMENT & PLAN NOTE
Patient has been on o2 since her dc from last sx  Currently saturating well on o2, wean off as tolerated

## 2022-05-27 NOTE — ASSESSMENT & PLAN NOTE
On 5/9/22 she underwent cystoscopy with BL ureteral catheters, open sigmoid colon resection, appendectomy, and creation of end colostomy.  Diet as tolerated  Added miralax

## 2022-05-27 NOTE — SUBJECTIVE & OBJECTIVE
Interval History: improved  sob and swelling in legs,able to eat small amounts at times, weak    Review of Systems   Constitutional:  Negative for chills and fever.   HENT:  Negative for trouble swallowing.    Respiratory:  Positive for shortness of breath. Negative for cough.    Cardiovascular:  Negative for chest pain and leg swelling.   Gastrointestinal:  Positive for abdominal pain. Negative for blood in stool, nausea and vomiting.   Genitourinary:  Negative for dysuria and hematuria.   Skin:  Negative for rash.   Neurological:  Positive for weakness. Negative for headaches.   Psychiatric/Behavioral:  Negative for confusion.    Objective:     Vital Signs (Most Recent):  Temp: 98.4 °F (36.9 °C) (05/27/22 1231)  Pulse: 79 (05/27/22 1231)  Resp: 18 (05/27/22 1231)  BP: (!) 104/53 (05/27/22 1231)  SpO2: 100 % (05/27/22 1231)   Vital Signs (24h Range):  Temp:  [97.5 °F (36.4 °C)-98.4 °F (36.9 °C)] 98.4 °F (36.9 °C)  Pulse:  [76-82] 79  Resp:  [16-19] 18  SpO2:  [99 %-100 %] 100 %  BP: ()/(53-60) 104/53     Weight: 53.9 kg (118 lb 13.3 oz)  Body mass index is 21.05 kg/m².    Intake/Output Summary (Last 24 hours) at 5/27/2022 1351  Last data filed at 5/27/2022 1000  Gross per 24 hour   Intake 780 ml   Output 1532 ml   Net -752 ml        Physical Exam  Vitals reviewed.   Constitutional:       General: She is not in acute distress.     Appearance: She is well-developed. She is ill-appearing.   HENT:      Head: Normocephalic and atraumatic.   Eyes:      Extraocular Movements: Extraocular movements intact.      Pupils: Pupils are equal, round, and reactive to light.   Cardiovascular:      Rate and Rhythm: Normal rate and regular rhythm.   Pulmonary:      Effort: Pulmonary effort is normal. No respiratory distress.      Comments: Bilateral crackles in back improved.  Abdominal:      General: Bowel sounds are normal. There is no distension.      Palpations: Abdomen is soft.      Tenderness: There is no abdominal  tenderness.      Comments: Midline incision, ostomy with small amount of semi solid stool   Musculoskeletal:         General: Swelling present. Normal range of motion.      Cervical back: Normal range of motion and neck supple.      Comments: swelling in both feet much improved   Skin:     General: Skin is warm.   Neurological:      General: No focal deficit present.      Mental Status: She is alert and oriented to person, place, and time.   Psychiatric:         Mood and Affect: Mood normal.         Behavior: Behavior normal.       Significant Labs: All pertinent labs within the past 24 hours have been reviewed.    Significant Imaging: I have reviewed all pertinent imaging results/findings within the past 24 hours.

## 2022-05-27 NOTE — PLAN OF CARE
Problem: Occupational Therapy  Goal: Occupational Therapy Goal  Description: Goals to be met by: 6/1/2022    Patient will increase functional independence with ADLs by performing:      LE Dressing with [place].  Grooming while standing at sink with Supervision.  Toileting from toilet with Supervision for hygiene and clothing management.   Toilet transfer to toilet with Supervision.    Completed Goals:   UE Dressing with [place].  Met 5/27/2022  Outcome: Ongoing, Progressing     Pt met one goal this date and is making progress towards others.  Short stay SNF is recommended upon d/c from acute care to further address deficits and help pt improve overall functional independence.     WILL Hess  5/27/2022

## 2022-05-27 NOTE — PT/OT/SLP PROGRESS
Occupational Therapy   Treatment    Name: Odette Hart  MRN: [id number]  Admitting Diagnosis:  Acute on chronic systolic heart failure       Recommendations:     Discharge Recommendations: nursing facility, skilled  Discharge Equipment Recommendations:   (TBD at next level of care)  Barriers to discharge:  None    Assessment:     Odette Hart is a 66 y.o. female with a medical diagnosis of Acute on chronic systolic heart failure.  She presents with mild discomfort in stomach. Performance deficits affecting function are weakness, impaired endurance, impaired self care skills, impaired functional mobilty, gait instability, impaired balance.  Pt agreeable to participating in therapy upon arrival to room.  Pt demonstrated improvement with toileting this date performing with CGA from BSC, and with grooming in standing performing with SBA, RW.  In addition, pt able to perform UB dressing without assist.  Pt reported feeling slightly dizzy while ambulating.  No LOB noted.      Overall, pt tolerated therapy well, but appeared somewhat anxious about breathing during session requiring encouragement, education, and redirection.  Pt expressed feeling SOB; however, SpO2 remained at 96% and above throughout session.  She met one goal this date and is making strong progress towards others.  She would continue to benefit from skilled OT services to address problems listed above and increase independence with ADLs.  SNF is recommended upon d/c from acute care to further address deficits and help pt improve overall functional independence.           Rehab Prognosis:  Good; patient would benefit from acute skilled OT services to address these deficits and reach maximum level of function.       Plan:     Patient to be seen 5 x/week to address the above listed problems via self-care/home management, therapeutic activities, therapeutic exercises  · Plan of Care Expires: 06/24/22  · Plan of Care Reviewed with:  patient    Subjective     Pain/Comfort:  Pain Rating 1: 5/10    Objective:     Communicated with: RN prior to session.  Patient found sitting edge of bed with oxygen, peripheral IV upon OT entry to room.  -Pt reported pads and underwear had been soaked with urine    General Precautions: Standard, fall   Orthopedic Precautions:N/A   Braces: N/A  Respiratory Status: Nasal cannula, flow 2 L/min     Occupational Performance:     Bed Mobility:    · Scooting: SBA seated towards EOB    Functional Mobility/Transfers:  · Sit <> Stand:   · CGA x 1 trial from EOB  · CGA x 1 trial from BSC  · CGA, RW x 1 trial from chair  · Toilet:  CGA/HHA taking a step from bed <> BSC  · Functional Mobility: Pt ambulated ~50 ft in room with CGA, RW on room air (bed <> sink in bathroom <> bedside chair); SpO2 remained at 99%.    Activities of Daily Living:  · Grooming: SBA, RW for washing hands while standing at sink.  · BSC placed behind pt in case seated rest break needed; not required   · Upper Body Dressing: Independent for donning gown on backside while seated on BSC.  · Lower Body Dressing: CGA for donning/doffing underwear.  · Pt performed first part of task in seated position on BSC, then stood to pull up.  · Toileting: CGA from BSC placed next to bed.  · Pt performed hygiene care after urinating in standing position.      [facility] 6 Click ADL: 20    Treatment & Education:  *Session focused on promoting increased endurance, UB strength, balance,  strength, fine motor control, coordination, and ROM needed for ADLs and functional transfers as part of daily routine.   -pt educated on role of OT in acute care setting and hand strengthening exercises  -pt performed toileting from BSC, LB dressing, UB dressing, and grooming in standing; cues provided for RW placement and safety.  -pt ambulated within room to address coordination, endurance, and balance needed for functional mobility  -pt performed hand strengthening exercises using  theraputty while seated up in chair  -pt performed sit <> stand transfer from EOB, BSC, and chair  -pt performed toilet transfer to BSC  -POC reviewed with pt        Patient left up in chair with all lines intact, Pure[place] in place, and call button in reachEducation:      GOALS:   Multidisciplinary Problems     Occupational Therapy Goals        Problem: Occupational Therapy    Goal Priority Disciplines Outcome Interventions   Occupational Therapy Goal     OT, PT/OT Ongoing, Progressing    Description: Goals to be met by: 6/1/2022    Patient will increase functional independence with ADLs by performing:      LE Dressing with [place].  Grooming while standing at sink with Supervision.  Toileting from toilet with Supervision for hygiene and clothing management.   Toilet transfer to toilet with Supervision.    Completed Goals:   UE Dressing with [place].  Met 5/27/2022                   Time Tracking:     OT Date of Treatment: 05/27/22  OT Start Time: 0956  OT Stop Time: 1028  OT Total Time (min): 31 min    Billable Minutes:Self Care/Home Management 31   *partial co-tx    OT/FROILAN: OT          5/27/2022

## 2022-05-27 NOTE — PROGRESS NOTES
[facility] Medicine  Progress Note    Patient Name: Odette Hart  MRN: [id number]  Patient Class: IP- Inpatient   Admission Date: 5/23/2022  Length of Stay: 2 days  Attending Physician: Sanjuanita Briones MD  Primary Care Provider: Braxton Barragan MD        Subjective:     Principal Problem:Acute on chronic systolic heart failure        HPI:  The patient is a 66 y.o. female with a past medical history of HTN, HLD, CAD, CHF with EF of 20%, MI and ICD who presents with complaint of shortness of breath x 2 weeks. Patient explains that she had an appendectomy, colostomy, cecectomy, and cholecystectomy on 05/09. Since the surgery, she has been recovering in a rehab facility. She states that she has been experiencing shortness of breath on exertion since the surgery. No fever or cough. She reports nausea but no vomiting.  On initial exam, the patient with acute dyspnea with any exertion.  BNP greater than 3000 with peripheral edema.  CXR not very impressive for pulmonary edema.  She will be admitted for further management of her acute on chronic systolic heart failure and Cardiology consult.      Overview/Hospital Course:  Continued on diuresis with improvement in breathing and swelling.      Interval History: improved  sob and swelling in legs,able to eat small amounts at times, weak    Review of Systems   Constitutional:  Negative for chills and fever.   HENT:  Negative for trouble swallowing.    Respiratory:  Positive for shortness of breath. Negative for cough.    Cardiovascular:  Negative for chest pain and leg swelling.   Gastrointestinal:  Positive for abdominal pain. Negative for blood in stool, nausea and vomiting.   Genitourinary:  Negative for dysuria and hematuria.   Skin:  Negative for rash.   Neurological:  Positive for weakness. Negative for headaches.   Psychiatric/Behavioral:  Negative for confusion.    Objective:     Vital Signs (Most Recent):  Temp: 98.4 °F (36.9 °C)  (05/27/22 1231)  Pulse: 79 (05/27/22 1231)  Resp: 18 (05/27/22 1231)  BP: (!) 104/53 (05/27/22 1231)  SpO2: 100 % (05/27/22 1231)   Vital Signs (24h Range):  Temp:  [97.5 °F (36.4 °C)-98.4 °F (36.9 °C)] 98.4 °F (36.9 °C)  Pulse:  [76-82] 79  Resp:  [16-19] 18  SpO2:  [99 %-100 %] 100 %  BP: ()/(53-60) 104/53     Weight: 53.9 kg (118 lb 13.3 oz)  Body mass index is 21.05 kg/m².    Intake/Output Summary (Last 24 hours) at 5/27/2022 1351  Last data filed at 5/27/2022 1000  Gross per 24 hour   Intake 780 ml   Output 1532 ml   Net -752 ml        Physical Exam  Vitals reviewed.   Constitutional:       General: She is not in acute distress.     Appearance: She is well-developed. She is ill-appearing.   HENT:      Head: Normocephalic and atraumatic.   Eyes:      Extraocular Movements: Extraocular movements intact.      Pupils: Pupils are equal, round, and reactive to light.   Cardiovascular:      Rate and Rhythm: Normal rate and regular rhythm.   Pulmonary:      Effort: Pulmonary effort is normal. No respiratory distress.      Comments: Bilateral crackles in back improved.  Abdominal:      General: Bowel sounds are normal. There is no distension.      Palpations: Abdomen is soft.      Tenderness: There is no abdominal tenderness.      Comments: Midline incision, ostomy with small amount of semi solid stool   Musculoskeletal:         General: Swelling present. Normal range of motion.      Cervical back: Normal range of motion and neck supple.      Comments: swelling in both feet much improved   Skin:     General: Skin is warm.   Neurological:      General: No focal deficit present.      Mental Status: She is alert and oriented to person, place, and time.   Psychiatric:         Mood and Affect: Mood normal.         Behavior: Behavior normal.       Significant Labs: All pertinent labs within the past 24 hours have been reviewed.    Significant Imaging: I have reviewed all pertinent imaging results/findings within the  past 24 hours.      Assessment/Plan:      * Acute on chronic systolic heart failure  CXR-  Stable findings of cardiomegaly with mild pulmonary vascular congestion.   BNP- 3340    Consult Cardiology, following  Lasix decreased to 40 mg oral with improved volume status  Also started aldactone 25 mg daily  dvt negative  Improved fluid status    Results for orders placed during the hospital encounter of 05/23/22    Echo    Interpretation Summary  · The left ventricle is severely enlarged with eccentric hypertrophy and severely decreased systolic function.  · Severe left atrial enlargement.  · Grade III left ventricular diastolic dysfunction.  · Normal right ventricular size with normal right ventricular systolic function.  · Mild-to-moderate mitral regurgitation.  · Mild tricuspid regurgitation.  · There is mild pulmonary hypertension.      Chronic hypoxemic respiratory failure  Patient has been on o2 since her dc from last sx  Currently saturating well on o2, wean off as tolerated        CAD (coronary artery disease)  s/p stent in 01/22  On asa and effient, per last cards note till 5/1/22  resumed      Hypomagnesemia  Replaced iv  improved      Debility  Therapy eval  Skilled suggested, does not want to go back to same facility    Anemia  Appears stable, will monitor      Colovesical fistula   On 5/9/22 she underwent cystoscopy with BL ureteral catheters, open sigmoid colon resection, appendectomy, and creation of end colostomy.  Diet as tolerated  Added miralax      Familial hypercholesterolemia  Continue Zetia        VTE Risk Mitigation (From admission, onward)         Ordered     enoxaparin injection 40 mg  Daily         05/26/22 1459     IP VTE HIGH RISK PATIENT  Once         05/24/22 0009     Place sequential compression device  Until discontinued         05/24/22 0009                Discharge Planning   SIVAN:      Code Status: Full Code   Is the patient medically ready for discharge?:     Reason for patient still  in hospital (select all that apply): Patient trending condition and Treatment  Discharge Plan A: Skilled Nursing Facility   Discharge Delays: None known at this time              Sanjuanita Briones MD  Department of Hospital Medicine   [facility] - Med Surg ([facility])

## 2022-05-27 NOTE — PROGRESS NOTES
[facility]    Admission date:  5/23/2022     Assessment    Acute on chronic systolic heart failure  Volume status looks good.     Coronary atherosclerosis  Stable and free of angina    Plan and Discussion    Continue with oral diuretics.  Continue carvedilol as blood pressure tolerates.  Is not on an ACE-inhibitor or ARB because of documented intolerances.  Does not seem to be true allergies.  Can consider rechallenging as an outpatient.    Subjective    Feels well.  Reports being out of bed most of yesterday.  Feeling stronger.  Less dyspneic.    Medications  Current Facility-Administered Medications   Medication Dose Route Frequency Provider Last Rate Last Admin    acetaminophen tablet 650 mg  650 mg Oral Q6H PRN Sanjuanita Briones MD        aspirin EC tablet 81 mg  81 mg Oral Daily Michael Long NP   81 mg at 05/26/22 0851    carvediloL tablet 3.125 mg  3.125 mg Oral BID Vince Lopez MD   3.125 mg at 05/26/22 0851    enoxaparin injection 40 mg  40 mg Subcutaneous Daily Sanjuanita Briones MD   40 mg at 05/26/22 1723    ezetimibe tablet 10 mg  10 mg Oral QHS Michael Long NP   10 mg at 05/26/22 2052    furosemide tablet 40 mg  40 mg Oral Daily Vince Lopez MD        ondansetron injection 4 mg  4 mg Intravenous Q8H PRN Michael Long NP        polyethylene glycol packet 17 g  17 g Oral Daily Sanjuanita Briones MD   17 g at 05/26/22 1441    prasugreL tablet 10 mg  10 mg Oral Daily Sanjuanita Briones MD   10 mg at 05/26/22 0851    sodium chloride 0.9% flush 10 mL  10 mL Intravenous PRN Michael Long NP        spironolactone tablet 25 mg  25 mg Oral Daily Vince Loepz MD   25 mg at 05/26/22 0851    traMADoL tablet 50 mg  50 mg Oral Q6H PRN Sanjuanita Briones MD   50 mg at 05/26/22 2052        Physical Exam    Temp:  [97.5 °F (36.4 °C)-97.9 °F (36.6 °C)]   Pulse:  [76-84]   Resp:  [16-19]   BP: ()/()   SpO2:  [99 %-100 %]    Wt Readings from Last 3 Encounters:    05/25/22 53.9 kg (118 lb 13.3 oz)   05/16/22 59.4 kg (130 lb 15.3 oz)   05/05/22 60.7 kg (133 lb 13.1 oz)     Physical Exam  Constitutional:       General: She is not in acute distress.  Neck:      Vascular: No JVD.   Cardiovascular:      Rate and Rhythm: Normal rate and regular rhythm.      Heart sounds: S1 normal and S2 normal. No murmur heard.    Gallop present. S4 sounds present. No S3 sounds.   Pulmonary:      Effort: Pulmonary effort is normal.      Breath sounds: Normal breath sounds and air entry.   Abdominal:      General: Bowel sounds are normal.      Palpations: Abdomen is soft. There is no hepatomegaly.      Tenderness: There is no abdominal tenderness.   Musculoskeletal:      Right lower leg: No edema.      Left lower leg: No edema.   Skin:     Coloration: Skin is not pale.   Neurological:      Mental Status: She is alert.   Psychiatric:         Behavior: Behavior is cooperative.           Labs  Recent Results (from the past 24 hour(s))   Basic metabolic panel    Collection Time: 05/27/22  3:41 AM   Result Value Ref Range    Sodium 134 (L) 136 - 145 mmol/L    Potassium 4.2 3.5 - 5.1 mmol/L    Chloride 92 (L) 95 - 110 mmol/L    CO2 34 (H) 23 - 29 mmol/L    Glucose 85 70 - 110 mg/dL    BUN 13 8 - 23 mg/dL    Creatinine 0.6 0.5 - 1.4 mg/dL    Calcium 8.6 (L) 8.7 - 10.5 mg/dL    Anion Gap 8 8 - 16 mmol/L    eGFR if African American >60 >60 mL/min/1.73 m^2    eGFR if non African American >60 >60 mL/min/1.73 m^2   CBC Auto Differential    Collection Time: 05/27/22  3:41 AM   Result Value Ref Range    WBC 2.88 (L) 3.90 - 12.70 K/uL    RBC 3.45 (L) 4.00 - 5.40 M/uL    Hemoglobin 9.9 (L) 12.0 - 16.0 g/dL    Hematocrit 31.1 (L) 37.0 - 48.5 %    MCV 90 82 - 98 fL    MCH 28.7 27.0 - 31.0 pg    MCHC 31.8 (L) 32.0 - 36.0 g/dL    RDW 21.1 (H) 11.5 - 14.5 %    Platelets 240 150 - 450 K/uL    MPV 10.2 9.2 - 12.9 fL    Immature Granulocytes CANCELED 0.0 - 0.5 %    Immature Grans (Abs) CANCELED 0.00 - 0.04 K/uL    Lymph  # CANCELED 1.0 - 4.8 K/uL    Mono # CANCELED 0.3 - 1.0 K/uL    Eos # CANCELED 0.0 - 0.5 K/uL    Baso # CANCELED 0.00 - 0.20 K/uL    nRBC 0 0 /100 WBC    Gran % 11.0 (L) 38.0 - 73.0 %    Lymph % 74.0 (H) 18.0 - 48.0 %    Mono % 15.0 4.0 - 15.0 %    Eosinophil % 0.0 0.0 - 8.0 %    Basophil % 0.0 0.0 - 1.9 %    Platelet Estimate Appears normal     Aniso Slight     Differential Method Manual    Magnesium    Collection Time: 05/27/22  3:41 AM   Result Value Ref Range    Magnesium 1.9 1.6 - 2.6 mg/dL   Phosphorus    Collection Time: 05/27/22  3:41 AM   Result Value Ref Range    Phosphorus 3.1 2.7 - 4.5 mg/dL               Vince Lopez MD

## 2022-05-27 NOTE — PLAN OF CARE
05/27/22 1004   Post-Acute Status   Post-Acute Authorization Placement   Post-Acute Placement Status Referrals Sent   Discharge Delays None known at this time   Discharge Plan   Discharge Plan A Skilled Nursing Facility

## 2022-05-28 NOTE — PLAN OF CARE
Pt AAO x 4. Pt sitting up in chair in room. Pt up with assist to BSCC. Voiding clear yellow urine. Pain controlled with pain medications as ordered. Pt free from falls or injury. Dsg to sacrum and Lt arm CDI. Tolerating diet. Safety maintained.  Call light in reach. VSS.  Problem: Adult Inpatient Plan of Care  Goal: Plan of Care Review  Outcome: Ongoing, Progressing  Goal: Patient-Specific Goal (Individualized)  Outcome: Ongoing, Progressing  Goal: Absence of Hospital-Acquired Illness or Injury  Outcome: Ongoing, Progressing  Goal: Optimal Comfort and Wellbeing  Outcome: Ongoing, Progressing  Goal: Readiness for Transition of Care  Outcome: Ongoing, Progressing     Problem: Impaired Wound Healing  Goal: Optimal Wound Healing  Outcome: Ongoing, Progressing     Problem: Skin Injury Risk Increased  Goal: Skin Health and Integrity  Outcome: Ongoing, Progressing     Problem: Fall Injury Risk  Goal: Absence of Fall and Fall-Related Injury  Outcome: Ongoing, Progressing

## 2022-05-28 NOTE — PROGRESS NOTES
"[facility]  Cardiology  Progress Note    Patient Name: Odette Hart  MRN: [id number]  Admission Date: 5/23/2022  Hospital Length of Stay: 3 days  Code Status: Full Code   Attending Physician: Sanjuanita Briones MD   Primary Care Physician: Braxton Barragan MD  Expected Discharge Date:   Principal Problem:Acute on chronic systolic heart failure    Subjective:     Brief HPI:    "This 66-year-old female with a known ischemic cardiomyopathy was brought from her nursing with worsening shortness of breath.  Shortness of breath has been worsening for past couple weeks.  She has been in and out the hospital and long-term facilities over the past couple of months due to abdominal problems.  This past January, she underwent implantation of coronary stents after non ST segment elevation myocardial infarction.  She is usually followed by Dr. Arenas.  Efforts at diuresis have been somewhat limited by the patient's relative hypotension".    Hospital Course:     Diuresis.    Interval History:     Weak.    Not SOB.    Review of Systems   Constitutional: Positive for malaise/fatigue. Negative for chills and fever.   HENT: Negative for nosebleeds.    Eyes: Negative for vision loss in left eye and vision loss in right eye.   Cardiovascular: Negative for chest pain, leg swelling, orthopnea, palpitations and paroxysmal nocturnal dyspnea.   Respiratory: Negative for cough, hemoptysis, shortness of breath, sputum production and wheezing.    Hematologic/Lymphatic: Negative for bleeding problem. Does not bruise/bleed easily.   Skin: Negative for color change and rash.   Musculoskeletal: Negative for muscle weakness and myalgias.   Gastrointestinal: Negative for abdominal pain, heartburn, hematemesis, hematochezia, melena, nausea and vomiting.   Genitourinary: Negative for hematuria.   Neurological: Positive for weakness. Negative for dizziness, focal weakness, headaches, light-headedness and vertigo. "   Psychiatric/Behavioral: Negative for altered mental status. The patient is not nervous/anxious.    Allergic/Immunologic: Negative for persistent infections.     Objective:     Vital Signs (Most Recent):  Temp: 98.2 °F (36.8 °C) (05/28/22 1238)  Pulse: 86 (05/28/22 1238)  Resp: 18 (05/28/22 1238)  BP: (!) 113/52 (05/28/22 1238)  SpO2: (!) 94 % (05/28/22 1238) Vital Signs (24h Range):  Temp:  [97.6 °F (36.4 °C)-98.4 °F (36.9 °C)] 98.2 °F (36.8 °C)  Pulse:  [] 86  Resp:  [18-20] 18  SpO2:  [94 %-100 %] 94 %  BP: ()/(45-60) 113/52     Weight: 53.9 kg (118 lb 13.3 oz)  Body mass index is 21.05 kg/m².    SpO2: (!) 94 %  O2 Device (Oxygen Therapy): nasal cannula      Intake/Output Summary (Last 24 hours) at 5/28/2022 1349  Last data filed at 5/28/2022 0745  Gross per 24 hour   Intake 80 ml   Output 501 ml   Net -421 ml       Lines/Drains/Airways     Drain  Duration                Colostomy 05/09/22 1100 Descending/sigmoid LLQ 19 days          Peripheral Intravenous Line  Duration                Peripheral IV - Single Lumen 05/23/22 1650 22 G Right Hand 4 days                Physical Exam  Constitutional:       General: She is not in acute distress.     Appearance: Normal appearance. She is well-developed. She is not ill-appearing, toxic-appearing or diaphoretic.   HENT:      Head: Normocephalic and atraumatic.      Nose: Nose normal.   Eyes:      General:         Right eye: No discharge.         Left eye: No discharge.      Conjunctiva/sclera:      Right eye: Right conjunctiva is not injected. No hemorrhage.     Left eye: Left conjunctiva is not injected. No hemorrhage.     Pupils: Pupils are equal.      Right eye: Pupil is round.      Left eye: Pupil is round.   Neck:      Thyroid: No thyromegaly.      Vascular: No carotid bruit or JVD.   Cardiovascular:      Rate and Rhythm: Normal rate and regular rhythm.  No extrasystoles are present.     Chest Wall: PMI is not displaced.      Pulses:           Radial  pulses are 2+ on the right side and 2+ on the left side.        Femoral pulses are 2+ on the right side and 2+ on the left side.       Dorsalis pedis pulses are 2+ on the right side and 2+ on the left side.        Posterior tibial pulses are 2+ on the right side and 2+ on the left side.      Heart sounds: S1 normal and S2 normal. Murmur heard.     Gallop present. S3 and S4 sounds present.   Pulmonary:      Effort: Pulmonary effort is normal.      Breath sounds: Normal breath sounds.   Chest:      Chest wall: No swelling or tenderness.   Abdominal:      Palpations: Abdomen is soft.      Tenderness: There is no abdominal tenderness.      Comments: Colostomy bag.   Musculoskeletal:      Cervical back: Neck supple.      Right lower leg: Normal. No swelling. No edema.      Left lower leg: Normal. No swelling. No edema.      Right ankle: No swelling.      Left ankle: No swelling.   Lymphadenopathy:      Head:      Right side of head: No submandibular adenopathy.      Left side of head: No submandibular adenopathy.      Cervical: No cervical adenopathy.   Skin:     General: Skin is warm and dry.      Findings: No rash.      Nails: There is no clubbing.   Neurological:      General: No focal deficit present.      Mental Status: She is alert and oriented to person, place, and time. She is not disoriented.      Cranial Nerves: No cranial nerve deficit.   Psychiatric:         Attention and Perception: Attention and perception normal.         Mood and Affect: Affect is blunt.         Current Medications:     aspirin  81 mg Oral Daily    carvediloL  3.125 mg Oral BID    enoxaparin  40 mg Subcutaneous Daily    ezetimibe  10 mg Oral QHS    furosemide  40 mg Oral Daily    polyethylene glycol  17 g Oral Daily    prasugreL  10 mg Oral Daily    spironolactone  25 mg Oral Daily     Current Laboratory Results:    No results found for this or any previous visit (from the past 24 hour(s)).  Current Imaging Results:    US Lower  Extremity Veins Bilateral   Final Result      No evidence of deep venous thrombosis in either lower extremity.      Bilateral popliteal fossa cysts.         Electronically signed by: Lucy Tovar MD   Date:    05/24/2022   Time:    21:17      X-Ray Chest AP Portable   Final Result      Stable findings of cardiomegaly with mild pulmonary vascular congestion.         Electronically signed by: Messi Villanueva MD   Date:    05/23/2022   Time:    15:52          Assessment and Plan:     Problem List:    Active Diagnoses:    Diagnosis Date Noted POA    PRINCIPAL PROBLEM:  Acute on chronic systolic heart failure [I50.23] 05/10/2022 Yes    Chronic hypoxemic respiratory failure [J96.11] 05/27/2022 Yes    Hypomagnesemia [E83.42] 05/24/2022 Yes    CAD (coronary artery disease) [I25.10] 05/24/2022 Yes    Debility [R53.81] 05/16/2022 Yes    Anemia [D64.9] 05/07/2022 Yes    Colovesical fistula [N32.1] 02/26/2022 Yes    Familial hypercholesterolemia [E78.01] 01/22/2022 Yes      Problems Resolved During this Admission:    Diagnosis Date Noted Date Resolved POA    Hyperkalemia [E87.5] 05/24/2022 05/26/2022 Yes    Hypotension (arterial) [I95.9] 05/19/2022 05/27/2022 Yes       Assessment and Plan:    Acute on chronic systolic heart failure   Volume status looks good.   Is not on an ACE-inhibitor or ARB because of documented intolerances.  Does not seem to be true allergies.   Continue with oral diuretics.  Continue carvedilol as blood pressure tolerates.    Coronary atherosclerosis   Stable and free of angina       VTE Risk Mitigation (From admission, onward)         Ordered     enoxaparin injection 40 mg  Daily         05/26/22 9189     IP VTE HIGH RISK PATIENT  Once         05/24/22 0009     Place sequential compression device  Until discontinued         05/24/22 0009                Yohannes Cordova MD  Cardiology  [facility] ([facility]

## 2022-05-28 NOTE — SUBJECTIVE & OBJECTIVE
Interval History: improved  sob and swelling in legs,able to eat small amounts at times, some amount of stool in bag, weaned to room air.    Review of Systems   Constitutional:  Negative for chills and fever.   HENT:  Negative for trouble swallowing.    Respiratory:  Positive for shortness of breath. Negative for cough.    Cardiovascular:  Negative for chest pain and leg swelling.   Gastrointestinal:  Positive for abdominal pain. Negative for blood in stool, nausea and vomiting.   Genitourinary:  Negative for dysuria and hematuria.   Skin:  Negative for rash.   Neurological:  Positive for weakness. Negative for headaches.   Psychiatric/Behavioral:  Negative for confusion.    Objective:     Vital Signs (Most Recent):  Temp: 98.2 °F (36.8 °C) (05/28/22 1238)  Pulse: 86 (05/28/22 1238)  Resp: 16 (05/28/22 1409)  BP: (!) 113/52 (05/28/22 1238)  SpO2: 99 % (05/28/22 1245)   Vital Signs (24h Range):  Temp:  [97.6 °F (36.4 °C)-98.4 °F (36.9 °C)] 98.2 °F (36.8 °C)  Pulse:  [] 86  Resp:  [16-20] 16  SpO2:  [94 %-100 %] 99 %  BP: ()/(45-60) 113/52     Weight: 53.9 kg (118 lb 13.3 oz)  Body mass index is 21.05 kg/m².    Intake/Output Summary (Last 24 hours) at 5/28/2022 1434  Last data filed at 5/28/2022 0745  Gross per 24 hour   Intake 20 ml   Output 500 ml   Net -480 ml        Physical Exam  Vitals reviewed.   Constitutional:       General: She is not in acute distress.     Appearance: She is well-developed. She is ill-appearing.   HENT:      Head: Normocephalic and atraumatic.   Eyes:      Extraocular Movements: Extraocular movements intact.      Pupils: Pupils are equal, round, and reactive to light.   Cardiovascular:      Rate and Rhythm: Normal rate and regular rhythm.   Pulmonary:      Effort: Pulmonary effort is normal. No respiratory distress.      Comments: Decreased breath sounds at bases.  Abdominal:      General: Bowel sounds are normal. There is no distension.      Palpations: Abdomen is soft.       Tenderness: There is no abdominal tenderness.      Comments: Midline incision, ostomy with small amount of semi solid stool   Musculoskeletal:         General: No swelling. Normal range of motion.      Cervical back: Normal range of motion and neck supple.      Comments: swelling in both feet much improved   Skin:     General: Skin is warm.   Neurological:      General: No focal deficit present.      Mental Status: She is alert and oriented to person, place, and time.   Psychiatric:         Mood and Affect: Mood normal.         Behavior: Behavior normal.       Significant Labs: All pertinent labs within the past 24 hours have been reviewed.    Significant Imaging: I have reviewed all pertinent imaging results/findings within the past 24 hours.

## 2022-05-28 NOTE — PT/OT/SLP PROGRESS
"Physical Therapy Treatment    Patient Name:  Odette Hart   MRN:  [id number]    Recommendations:     Discharge Recommendations:  nursing facility, skilled (short stay)   Discharge Equipment Recommendations:  (TBD, though at this time using a RW and O2 tank)   Barriers to discharge: Decreased caregiver support    Assessment:     Odette Hart is a 66 y.o. female admitted with a medical diagnosis of Acute on chronic systolic heart failure.  She presents with the following impairments/functional limitations:  weakness, gait instability, impaired balance, impaired endurance, impaired functional mobilty, impaired self care skills.    Sit>stand with RW and SBA  Toilet transfer with no AD and CGA, step transfer  Amb 3 x 30' with RW and SBA on RA, standing rest breaks taken btwn bouts, decreased gait speed, jan and B step length  SpO2 at rest on RA 93% increased to 99% with intentional breathing  Pt with decreased O2 needs, good sats on RA, decreased endurance and strength  Rec SNF    Rehab Prognosis: Good; patient would benefit from acute skilled PT services to address these deficits and reach maximum level of function.    Recent Surgery: * No surgery found *      Plan:     During this hospitalization, patient to be seen 5 x/week to address the identified rehab impairments via gait training, therapeutic activities, therapeutic exercises, neuromuscular re-education and progress toward the following goals:    · Plan of Care Expires:  06/24/22    Subjective     Chief Complaint: colostomy pain from air intrusion; some stinging when I pee, I hope it's not a UTI  Patient/Family Comments/goals: They took me off O2 this morning; The edema is down and "my feet actually look like feet today."  Pain/Comfort:  · Pain Rating 1: 6/10  · Location - Orientation 1: anterior  · Location 1:  (colostomy site)  · Pain Addressed 1: Reposition, Distraction, Cessation of Activity  · Pain Rating Post-Intervention 1: " 6/10      Objective:     Communicated with nurse Mon prior to session.  Patient found ambulatory in room/johnson with peripheral IV upon PT entry to room.     General Precautions: Standard, fall   Orthopedic Precautions:N/A   Braces:    Respiratory Status: Room air     Functional Mobility:  · Transfers:     · Sit to Stand:  stand by assistance with no AD and rolling walker  · Toilet Transfer: contact guard assistance with  no AD  using  Step Transfer  · Gait: 3 x 30' with RW and SBA on RA, standing rest breaks taken btwn bouts, decreased gait speed, jan and B step length      AM-PAC 6 CLICK MOBILITY  Turning over in bed (including adjusting bedclothes, sheets and blankets)?: 3  Sitting down on and standing up from a chair with arms (e.g., wheelchair, bedside commode, etc.): 3  Moving from lying on back to sitting on the side of the bed?: 3  Moving to and from a bed to a chair (including a wheelchair)?: 3  Need to walk in hospital room?: 3  Climbing 3-5 steps with a railing?: 2  Basic Mobility Total Score: 17       Therapeutic Activities and Exercises:   SpO2 at rest on RA 93% increased to 99% with intentional breathing  Gait training as noted    Patient left up in chair with all lines intact, call button in reach and nurse Yaa notified..    GOALS:   Multidisciplinary Problems     Physical Therapy Goals        Problem: Physical Therapy    Goal Priority Disciplines Outcome Goal Variances Interventions   Physical Therapy Goal     PT, PT/OT Ongoing, Progressing     Description: Goals to be met by: 22    Patient will increase functional independence with mobility by performin. Supine<>sit with supervision without use of hospital bed features utilizing logroll technique.   2. Sit<>stand with supervision with LRAD.  3. Gait x 150 feet with supervision with LRAD.  4. Standing activity x5 min on appropriate supplemental O2 without LI.   5. Ascend/descend 4 step(s) with least restrictive assistive device and B  HR with SBA.                    Time Tracking:     PT Received On: 05/28/22  PT Start Time: 1332     PT Stop Time: 1357  PT Total Time (min): 25 min     Billable Minutes: Gait Training 15 and Therapeutic Exercise 10    Treatment Type: Treatment  PT/PTA: PTA     PTA Visit Number: 3     05/28/2022

## 2022-05-28 NOTE — PROGRESS NOTES
[facility] Medicine  Progress Note    Patient Name: Odette Hart  MRN: [id number]  Patient Class: IP- Inpatient   Admission Date: 5/23/2022  Length of Stay: 3 days  Attending Physician: Sanjuanita Briones MD  Primary Care Provider: Braxton Barragan MD        Subjective:     Principal Problem:Acute on chronic systolic heart failure        HPI:  The patient is a 66 y.o. female with a past medical history of HTN, HLD, CAD, CHF with EF of 20%, MI and ICD who presents with complaint of shortness of breath x 2 weeks. Patient explains that she had an appendectomy, colostomy, cecectomy, and cholecystectomy on 05/09. Since the surgery, she has been recovering in a rehab facility. She states that she has been experiencing shortness of breath on exertion since the surgery. No fever or cough. She reports nausea but no vomiting.  On initial exam, the patient with acute dyspnea with any exertion.  BNP greater than 3000 with peripheral edema.  CXR not very impressive for pulmonary edema.  She will be admitted for further management of her acute on chronic systolic heart failure and Cardiology consult.      Overview/Hospital Course:  Continued on diuresis with improvement in breathing and swelling.      Interval History: improved  sob and swelling in legs,able to eat small amounts at times, some amount of stool in bag, weaned to room air.    Review of Systems   Constitutional:  Negative for chills and fever.   HENT:  Negative for trouble swallowing.    Respiratory:  Positive for shortness of breath. Negative for cough.    Cardiovascular:  Negative for chest pain and leg swelling.   Gastrointestinal:  Positive for abdominal pain. Negative for blood in stool, nausea and vomiting.   Genitourinary:  Negative for dysuria and hematuria.   Skin:  Negative for rash.   Neurological:  Positive for weakness. Negative for headaches.   Psychiatric/Behavioral:  Negative for confusion.    Objective:     Vital Signs  (Most Recent):  Temp: 98.2 °F (36.8 °C) (05/28/22 1238)  Pulse: 86 (05/28/22 1238)  Resp: 16 (05/28/22 1409)  BP: (!) 113/52 (05/28/22 1238)  SpO2: 99 % (05/28/22 1245)   Vital Signs (24h Range):  Temp:  [97.6 °F (36.4 °C)-98.4 °F (36.9 °C)] 98.2 °F (36.8 °C)  Pulse:  [] 86  Resp:  [16-20] 16  SpO2:  [94 %-100 %] 99 %  BP: ()/(45-60) 113/52     Weight: 53.9 kg (118 lb 13.3 oz)  Body mass index is 21.05 kg/m².    Intake/Output Summary (Last 24 hours) at 5/28/2022 1434  Last data filed at 5/28/2022 0745  Gross per 24 hour   Intake 20 ml   Output 500 ml   Net -480 ml        Physical Exam  Vitals reviewed.   Constitutional:       General: She is not in acute distress.     Appearance: She is well-developed. She is ill-appearing.   HENT:      Head: Normocephalic and atraumatic.   Eyes:      Extraocular Movements: Extraocular movements intact.      Pupils: Pupils are equal, round, and reactive to light.   Cardiovascular:      Rate and Rhythm: Normal rate and regular rhythm.   Pulmonary:      Effort: Pulmonary effort is normal. No respiratory distress.      Comments: Decreased breath sounds at bases.  Abdominal:      General: Bowel sounds are normal. There is no distension.      Palpations: Abdomen is soft.      Tenderness: There is no abdominal tenderness.      Comments: Midline incision, ostomy with small amount of semi solid stool   Musculoskeletal:         General: No swelling. Normal range of motion.      Cervical back: Normal range of motion and neck supple.      Comments: swelling in both feet much improved   Skin:     General: Skin is warm.   Neurological:      General: No focal deficit present.      Mental Status: She is alert and oriented to person, place, and time.   Psychiatric:         Mood and Affect: Mood normal.         Behavior: Behavior normal.       Significant Labs: All pertinent labs within the past 24 hours have been reviewed.    Significant Imaging: I have reviewed all pertinent imaging  results/findings within the past 24 hours.      Assessment/Plan:      * Acute on chronic systolic heart failure  CXR-  Stable findings of cardiomegaly with mild pulmonary vascular congestion.   BNP- 3340    Consult Cardiology, following  Lasix decreased to 40 mg oral with improved volume status  Also started aldactone 25 mg daily  dvt negative  Improved fluid status    Results for orders placed during the hospital encounter of 05/23/22    Echo    Interpretation Summary  · The left ventricle is severely enlarged with eccentric hypertrophy and severely decreased systolic function.  · Severe left atrial enlargement.  · Grade III left ventricular diastolic dysfunction.  · Normal right ventricular size with normal right ventricular systolic function.  · Mild-to-moderate mitral regurgitation.  · Mild tricuspid regurgitation.  · There is mild pulmonary hypertension.      Chronic hypoxemic respiratory failure  Patient has been on o2 since her dc from last sx  Currently saturating well on o2, wean off as tolerated  On room air today        CAD (coronary artery disease)  s/p stent in 01/22  On asa and effient, per last cards note till 5/1/22  resumed      Hypomagnesemia  Replaced iv  improved      Debility  Therapy eval  Skilled suggested, does not want to go back to same facility    Anemia  Appears stable, will monitor      Colovesical fistula   On 5/9/22 she underwent cystoscopy with BL ureteral catheters, open sigmoid colon resection, appendectomy, and creation of end colostomy.  Diet as tolerated  Added miralax      Familial hypercholesterolemia  Continue Zetia        VTE Risk Mitigation (From admission, onward)         Ordered     enoxaparin injection 40 mg  Daily         05/26/22 1459     IP VTE HIGH RISK PATIENT  Once         05/24/22 0009     Place sequential compression device  Until discontinued         05/24/22 0009                Discharge Planning   SIVAN:      Code Status: Full Code   Is the patient medically  ready for discharge?:     Reason for patient still in hospital (select all that apply): Patient trending condition and Treatment  Discharge Plan A: Skilled Nursing Facility   Discharge Delays: None known at this time              Sanjuanita Briones MD  Department of Hospital Medicine   [facility])

## 2022-05-28 NOTE — ASSESSMENT & PLAN NOTE
Patient has been on o2 since her dc from last sx  Currently saturating well on o2, wean off as tolerated  On room air today

## 2022-05-29 NOTE — PLAN OF CARE
Patient on RA at %    Up in chair and OOB.  BRP/BSC x assist Wound care completed and revised per Dr Briones.

## 2022-05-29 NOTE — ASSESSMENT & PLAN NOTE
Therapy andrse  Skilled suggested, does not want to go back to same facility  Wants to go to [facility]

## 2022-05-29 NOTE — PLAN OF CARE
Patient A/Ox4, afebrile resting in bed, able to make needs known. Vss within normal range, respiration even bilaterally and unlabored. Complained of legs pain and PRN pain medication administered per Physician order. Strict fluids restriction to 1.5 L maintained. Safety precaution are in place. Purposeful Hourly rounding completed, no events noted during shift.   Problem: Adult Inpatient Plan of Care  Goal: Plan of Care Review  Outcome: Ongoing, Progressing  Goal: Patient-Specific Goal (Individualized)  Outcome: Ongoing, Progressing  Goal: Absence of Hospital-Acquired Illness or Injury  Outcome: Ongoing, Progressing  Goal: Optimal Comfort and Wellbeing  Outcome: Ongoing, Progressing  Goal: Readiness for Transition of Care  Outcome: Ongoing, Progressing     Problem: Impaired Wound Healing  Goal: Optimal Wound Healing  Outcome: Ongoing, Progressing     Problem: Skin Injury Risk Increased  Goal: Skin Health and Integrity  Outcome: Ongoing, Progressing     Problem: Fall Injury Risk  Goal: Absence of Fall and Fall-Related Injury  Outcome: Ongoing, Progressing

## 2022-05-29 NOTE — PROGRESS NOTES
"[facility]  Cardiology  Progress Note    Patient Name: Odette Hart  MRN: [id number]  Admission Date: 5/23/2022  Hospital Length of Stay: 4 days  Code Status: Full Code   Attending Physician: Sanjuanita Briones MD   Primary Care Physician: Braxton Barragan MD  Expected Discharge Date:   Principal Problem:Acute on chronic systolic heart failure    Subjective:     Brief HPI:    "This 66-year-old female with a known ischemic cardiomyopathy was brought from her nursing with worsening shortness of breath.  Shortness of breath has been worsening for past couple weeks.  She has been in and out the hospital and long-term facilities over the past couple of months due to abdominal problems.  This past January, she underwent implantation of coronary stents after non ST segment elevation myocardial infarction.  She is usually followed by Dr. Arenas.  Efforts at diuresis have been somewhat limited by the patient's relative hypotension".    Hospital Course:     Diuresis.    Interval History:     Weak.    Not SOB.    Review of Systems   Constitutional: Positive for malaise/fatigue. Negative for chills and fever.   HENT: Negative for nosebleeds.    Eyes: Negative for vision loss in left eye and vision loss in right eye.   Cardiovascular: Negative for chest pain, leg swelling, orthopnea, palpitations and paroxysmal nocturnal dyspnea.   Respiratory: Negative for cough, hemoptysis, shortness of breath, sputum production and wheezing.    Hematologic/Lymphatic: Negative for bleeding problem. Does not bruise/bleed easily.   Skin: Negative for color change and rash.   Musculoskeletal: Negative for muscle weakness and myalgias.   Gastrointestinal: Negative for abdominal pain, heartburn, hematemesis, hematochezia, melena, nausea and vomiting.   Genitourinary: Negative for hematuria.   Neurological: Positive for weakness. Negative for dizziness, focal weakness, headaches, light-headedness and vertigo. "   Psychiatric/Behavioral: Negative for altered mental status. The patient is not nervous/anxious.    Allergic/Immunologic: Negative for persistent infections.     Objective:     Vital Signs (Most Recent):  Temp: 98.6 °F (37 °C) (05/29/22 1155)  Pulse: 92 (05/29/22 1155)  Resp: 18 (05/29/22 1155)  BP: (S) (!) 88/53 (05/29/22 1155)  SpO2: 99 % (05/29/22 1155) Vital Signs (24h Range):  Temp:  [97.8 °F (36.6 °C)-98.6 °F (37 °C)] 98.6 °F (37 °C)  Pulse:  [] 92  Resp:  [16-19] 18  SpO2:  [95 %-100 %] 99 %  BP: ()/(50-59) 88/53     Weight: 53.9 kg (118 lb 13.3 oz)  Body mass index is 21.05 kg/m².    SpO2: 99 %  O2 Device (Oxygen Therapy): room air      Intake/Output Summary (Last 24 hours) at 5/29/2022 1340  Last data filed at 5/29/2022 1227  Gross per 24 hour   Intake 290 ml   Output 800 ml   Net -510 ml       Lines/Drains/Airways     Drain  Duration                Colostomy 05/09/22 1100 Descending/sigmoid LLQ 20 days          Peripheral Intravenous Line  Duration                Peripheral IV - Single Lumen 05/23/22 1650 22 G Right Hand 5 days                Physical Exam  Constitutional:       General: She is not in acute distress.     Appearance: Normal appearance. She is well-developed. She is not ill-appearing, toxic-appearing or diaphoretic.   HENT:      Head: Normocephalic and atraumatic.      Nose: Nose normal.   Eyes:      General:         Right eye: No discharge.         Left eye: No discharge.      Conjunctiva/sclera:      Right eye: Right conjunctiva is not injected. No hemorrhage.     Left eye: Left conjunctiva is not injected. No hemorrhage.     Pupils: Pupils are equal.      Right eye: Pupil is round.      Left eye: Pupil is round.   Neck:      Thyroid: No thyromegaly.      Vascular: No carotid bruit or JVD.   Cardiovascular:      Rate and Rhythm: Normal rate and regular rhythm.  No extrasystoles are present.     Chest Wall: PMI is not displaced.      Pulses:           Radial pulses are 2+ on  the right side and 2+ on the left side.        Femoral pulses are 2+ on the right side and 2+ on the left side.       Dorsalis pedis pulses are 2+ on the right side and 2+ on the left side.        Posterior tibial pulses are 2+ on the right side and 2+ on the left side.      Heart sounds: S1 normal and S2 normal. Murmur heard.     Gallop present. S3 and S4 sounds present.   Pulmonary:      Effort: Pulmonary effort is normal.      Breath sounds: Normal breath sounds.   Chest:      Chest wall: No swelling or tenderness.   Abdominal:      Palpations: Abdomen is soft.      Tenderness: There is no abdominal tenderness.      Comments: Colostomy bag.   Musculoskeletal:      Cervical back: Neck supple.      Right lower leg: Normal. No swelling. No edema.      Left lower leg: Normal. No swelling. No edema.      Right ankle: No swelling.      Left ankle: No swelling.   Lymphadenopathy:      Head:      Right side of head: No submandibular adenopathy.      Left side of head: No submandibular adenopathy.      Cervical: No cervical adenopathy.   Skin:     General: Skin is warm and dry.      Findings: No rash.      Nails: There is no clubbing.   Neurological:      General: No focal deficit present.      Mental Status: She is alert and oriented to person, place, and time. She is not disoriented.      Cranial Nerves: No cranial nerve deficit.   Psychiatric:         Attention and Perception: Attention and perception normal.         Mood and Affect: Affect is blunt.         Current Medications:     aspirin  81 mg Oral Daily    carvediloL  3.125 mg Oral BID    enoxaparin  40 mg Subcutaneous Daily    ezetimibe  10 mg Oral QHS    furosemide  40 mg Oral Daily    magnesium sulfate IVPB  2 g Intravenous Once    polyethylene glycol  17 g Oral Daily    prasugreL  10 mg Oral Daily    spironolactone  25 mg Oral Daily     Current Laboratory Results:    Recent Results (from the past 24 hour(s))   Urinalysis, Reflex to Urine Culture  Urine, Catheterized    Collection Time: 05/28/22  4:20 PM    Specimen: Urine   Result Value Ref Range    Specimen UA Urine, Catheterized     Color, UA Yellow Yellow, Straw, Tammy    Appearance, UA Clear Clear    pH, UA 8.0 5.0 - 8.0    Specific Gravity, UA 1.015 1.005 - 1.030    Protein, UA Negative Negative    Glucose, UA Negative Negative    Ketones, UA Negative Negative    Bilirubin (UA) Negative Negative    Occult Blood UA 3+ (A) Negative    Nitrite, UA Negative Negative    Urobilinogen, UA Negative <2.0 EU/dL    Leukocytes, UA 1+ (A) Negative   Urinalysis Microscopic    Collection Time: 05/28/22  4:20 PM   Result Value Ref Range    RBC, UA 8 (H) 0 - 4 /hpf    WBC, UA 10 (H) 0 - 5 /hpf    Bacteria Occasional None-Occ /hpf    Microscopic Comment SEE COMMENT    Basic Metabolic Panel    Collection Time: 05/29/22  4:59 AM   Result Value Ref Range    Sodium 135 (L) 136 - 145 mmol/L    Potassium 3.5 3.5 - 5.1 mmol/L    Chloride 96 95 - 110 mmol/L    CO2 27 23 - 29 mmol/L    Glucose 93 70 - 110 mg/dL    BUN 11 8 - 23 mg/dL    Creatinine 0.6 0.5 - 1.4 mg/dL    Calcium 8.7 8.7 - 10.5 mg/dL    Anion Gap 12 8 - 16 mmol/L    eGFR if African American >60 >60 mL/min/1.73 m^2    eGFR if non African American >60 >60 mL/min/1.73 m^2   CBC Auto Differential    Collection Time: 05/29/22  4:59 AM   Result Value Ref Range    WBC 3.10 (L) 3.90 - 12.70 K/uL    RBC 3.59 (L) 4.00 - 5.40 M/uL    Hemoglobin 10.3 (L) 12.0 - 16.0 g/dL    Hematocrit 32.2 (L) 37.0 - 48.5 %    MCV 90 82 - 98 fL    MCH 28.7 27.0 - 31.0 pg    MCHC 32.0 32.0 - 36.0 g/dL    RDW 20.6 (H) 11.5 - 14.5 %    Platelets 255 150 - 450 K/uL    MPV 10.1 9.2 - 12.9 fL    Immature Granulocytes CANCELED 0.0 - 0.5 %    Immature Grans (Abs) CANCELED 0.00 - 0.04 K/uL    Lymph # CANCELED 1.0 - 4.8 K/uL    Mono # CANCELED 0.3 - 1.0 K/uL    Eos # CANCELED 0.0 - 0.5 K/uL    Baso # CANCELED 0.00 - 0.20 K/uL    nRBC 0 0 /100 WBC    Gran % 3.0 (L) 38.0 - 73.0 %    Lymph % 75.0 (H) 18.0  - 48.0 %    Mono % 21.0 (H) 4.0 - 15.0 %    Eosinophil % 1.0 0.0 - 8.0 %    Basophil % 0.0 0.0 - 1.9 %    Platelet Estimate Appears normal     Aniso Slight     Differential Method Manual    Magnesium    Collection Time: 05/29/22  4:59 AM   Result Value Ref Range    Magnesium 1.6 1.6 - 2.6 mg/dL   Phosphorus    Collection Time: 05/29/22  4:59 AM   Result Value Ref Range    Phosphorus 2.9 2.7 - 4.5 mg/dL     Current Imaging Results:    US Lower Extremity Veins Bilateral   Final Result      No evidence of deep venous thrombosis in either lower extremity.      Bilateral popliteal fossa cysts.         Electronically signed by: Lucy Tovar MD   Date:    05/24/2022   Time:    21:17      X-Ray Chest AP Portable   Final Result      Stable findings of cardiomegaly with mild pulmonary vascular congestion.         Electronically signed by: Messi Villanueva MD   Date:    05/23/2022   Time:    15:52          Assessment and Plan:     Problem List:    Active Diagnoses:    Diagnosis Date Noted POA    PRINCIPAL PROBLEM:  Acute on chronic systolic heart failure [I50.23] 05/10/2022 Yes    Chronic hypoxemic respiratory failure [J96.11] 05/27/2022 Yes    Hypomagnesemia [E83.42] 05/24/2022 Yes    CAD (coronary artery disease) [I25.10] 05/24/2022 Yes    Debility [R53.81] 05/16/2022 Yes    Anemia [D64.9] 05/07/2022 Yes    Colovesical fistula [N32.1] 02/26/2022 Yes    Familial hypercholesterolemia [E78.01] 01/22/2022 Yes      Problems Resolved During this Admission:    Diagnosis Date Noted Date Resolved POA    Hyperkalemia [E87.5] 05/24/2022 05/26/2022 Yes    Hypotension (arterial) [I95.9] 05/19/2022 05/27/2022 Yes       Assessment and Plan:    Acute on chronic systolic heart failure   Volume status looks good.   Is not on an ACE-inhibitor or ARB because of documented intolerances.  Does not seem to be true allergies.   Continue with oral diuretics.  Continue carvedilol as blood pressure tolerates.    Coronary atherosclerosis   Stable  and free of angina.   Will change prasugrel to clopidogrel to reduec risk of bleeding.       VTE Risk Mitigation (From admission, onward)         Ordered     enoxaparin injection 40 mg  Daily         05/26/22 1459     IP VTE HIGH RISK PATIENT  Once         05/24/22 0009     Place sequential compression device  Until discontinued         05/24/22 0009                Yohannes Cordova MD  Cardiology  [facility] ([facility])

## 2022-05-29 NOTE — SUBJECTIVE & OBJECTIVE
Interval History: improved  sob and swelling in legs,able to eat small amounts at times, more stool in bag, weaned to room air, c/o urine darker in colour and some burning.    Review of Systems   Constitutional:  Negative for chills and fever.   HENT:  Negative for trouble swallowing.    Respiratory:  Negative for cough and shortness of breath.    Cardiovascular:  Negative for chest pain and leg swelling.   Gastrointestinal:  Positive for abdominal pain. Negative for blood in stool, nausea and vomiting.   Genitourinary:  Negative for dysuria and hematuria.   Skin:  Negative for rash.   Neurological:  Positive for weakness. Negative for headaches.   Psychiatric/Behavioral:  Negative for confusion.    Objective:     Vital Signs (Most Recent):  Temp: 98.6 °F (37 °C) (05/29/22 1155)  Pulse: 92 (05/29/22 1155)  Resp: 18 (05/29/22 1155)  BP: (S) (!) 88/53 (05/29/22 1155)  SpO2: 99 % (05/29/22 1155)   Vital Signs (24h Range):  Temp:  [97.8 °F (36.6 °C)-98.6 °F (37 °C)] 98.6 °F (37 °C)  Pulse:  [] 92  Resp:  [17-19] 18  SpO2:  [95 %-100 %] 99 %  BP: ()/(50-59) 88/53     Weight: 53.9 kg (118 lb 13.3 oz)  Body mass index is 21.05 kg/m².    Intake/Output Summary (Last 24 hours) at 5/29/2022 1439  Last data filed at 5/29/2022 1227  Gross per 24 hour   Intake 230 ml   Output 800 ml   Net -570 ml        Physical Exam  Vitals reviewed.   Constitutional:       General: She is not in acute distress.     Appearance: She is well-developed. She is ill-appearing.   HENT:      Head: Normocephalic and atraumatic.   Eyes:      Extraocular Movements: Extraocular movements intact.      Pupils: Pupils are equal, round, and reactive to light.   Cardiovascular:      Rate and Rhythm: Normal rate and regular rhythm.   Pulmonary:      Effort: Pulmonary effort is normal. No respiratory distress.      Comments: Decreased breath sounds at bases.  Abdominal:      General: Bowel sounds are normal. There is no distension.      Palpations:  Abdomen is soft.      Tenderness: There is no abdominal tenderness.      Comments: Midline incision, ostomy with small amount of semi solid stool   Musculoskeletal:         General: No swelling. Normal range of motion.      Cervical back: Normal range of motion and neck supple.      Comments: swelling in both feet much improved   Skin:     General: Skin is warm.   Neurological:      General: No focal deficit present.      Mental Status: She is alert and oriented to person, place, and time.   Psychiatric:         Mood and Affect: Mood normal.         Behavior: Behavior normal.       Significant Labs: All pertinent labs within the past 24 hours have been reviewed.    Significant Imaging: I have reviewed all pertinent imaging results/findings within the past 24 hours.

## 2022-05-29 NOTE — ASSESSMENT & PLAN NOTE
CXR-  Stable findings of cardiomegaly with mild pulmonary vascular congestion.   BNP- 3340    Consult Cardiology, following  Lasix decreased to 40 mg oral with improved volume status  Also started aldactone 25 mg daily  dvt negative  Improved fluid status  ? Decrease lasix to 20 mg daily, bp on lower side and urine looks concentrated    Results for orders placed during the hospital encounter of 05/23/22    Echo    Interpretation Summary  · The left ventricle is severely enlarged with eccentric hypertrophy and severely decreased systolic function.  · Severe left atrial enlargement.  · Grade III left ventricular diastolic dysfunction.  · Normal right ventricular size with normal right ventricular systolic function.  · Mild-to-moderate mitral regurgitation.  · Mild tricuspid regurgitation.  · There is mild pulmonary hypertension.

## 2022-05-30 NOTE — PLAN OF CARE
[facility] Med [facility] ([facility])  Discharge Reassessment    Primary Care Provider: Braxton Barragan MD    Expected Discharge Date:     Reassessment (most recent)       Discharge Reassessment - 05/30/22 1623          Discharge Reassessment    Assessment Type Discharge Planning Reassessment     Did the patient's condition or plan change since previous assessment? Yes     Discharge Plan discussed with: Patient     Discharge Plan A Rehab     Discharge Plan B Home Health     Discharge Barriers Identified None     Why the patient remains in the hospital Placement issues (P)         Post-Acute Status    Post-Acute Placement Status Referrals Sent (P)      Patient choice form signed by patient/caregiver List with quality metrics by geographic area provided (P)      Discharge Delays Post-Acute Set-up (P)                    Patient Choice form signed by patient.  Stated that granddaughter will stay with her after discharge.  Patient stated that she does not want [facility]IFF.  She said she wants [facility] or home with HH.  Patient [facility] her.

## 2022-05-30 NOTE — ASSESSMENT & PLAN NOTE
s/p stent in 01/22  On asa and effient, per last cards note till 5/1/22  Resumed and changed to plavix

## 2022-05-30 NOTE — ASSESSMENT & PLAN NOTE
Patient has been on o2 since her dc from last sx  Currently saturating well on o2, wean off as tolerated  On room air for 3 days

## 2022-05-30 NOTE — ASSESSMENT & PLAN NOTE
Therapy andres  Skilled suggested, does not want to go back to same facility  Wants to go to [facility]   Patient decided to go home tomorrow

## 2022-05-30 NOTE — PT/OT/SLP PROGRESS
Occupational Therapy   Treatment    Name: Odette Hart  MRN: [id number]  Admitting Diagnosis:  Acute on chronic systolic heart failure       Recommendations:     Discharge Recommendations: nursing facility, skilled  Discharge Equipment Recommendations:   (defer to next level of care)  Barriers to discharge:   (current functional level)    Assessment:   Requiring CGA for steadying/safety during standing ADL for clothing management and during household level ambulation to restroom due to impaired dynamic standing.  Requires consistent reminders for preparatory positioning during transitional movements.  Progressing towards goals.  Continued recommendation of post acute OT in SNF.  To benefit from continued acute care OT services to increase independence in self-care/functional transfers.  Continue POC.     Odette Hart is a 66 y.o. female with a medical diagnosis of Acute on chronic systolic heart failure.  She presents with below deficits decreasing independence in self-care/functional transfers. Performance deficits affecting function are weakness, impaired endurance, impaired self care skills, gait instability, impaired functional mobilty, impaired balance, decreased safety awareness, impaired cardiopulmonary response to activity, impaired skin.     Rehab Prognosis:  Good; patient would benefit from acute skilled OT services to address these deficits and reach maximum level of function.       Plan:     Patient to be seen 5 x/week to address the above listed problems via self-care/home management, therapeutic activities, therapeutic exercises  · Plan of Care Expires: 06/24/22  · Plan of Care Reviewed with: patient    Subjective     Pain/Comfort:  · Pain Rating 1: 0/10  · Pain Rating Post-Intervention 1: 0/10    Objective:     Communicated with: Elisabeth Bae RN prior to session.  Patient found sitting edge of bed with peripheral IV, colostomy upon OT entry to room.    General Precautions: Standard,  fall (fluid restriction)   Orthopedic Precautions:N/A   Braces: N/A  Respiratory Status: Room air     Occupational Performance:     Vitals     Prior to activity:  Position:  EOB sitting   BP at LUE:  99/59  O2 on room air:  100%  HR:   BPM    After activity:  Position:  EOB sitting   BP at LUE:  100/54  O2 on room air:  99%  HR:   BPM    Bed Mobility:    · Found seated EOB; sit>supine with SBA.     Functional Mobility/Transfers:  · Sit>stand EOB>RW with CGA for steadying/safety and verbal cuing for hand placement during transitions though with 0 follow through.  Ambulating short household distance with RW with CGA for steadying/safety bed>bathroom sink>bathroom toilet and return to bed.  Step transfer to toilet with RW and BSC frame over toilet with verbal cuing for safe hand placement 0 follow through during controlled descent though following through with command during stance.     Activities of Daily Living:  · Voiding less than 100 CC seated at BSC frame over toilet, able to clean front renata region and place sanitary pad in mesh underwear all in sitting and retrieval of needed items.  CGA for steadying/safety while managing clothing prior to and after toileting task.  Hand hygiene and oral care in stance at sink with good self-initiation of RW positioning at sink and retrieval of all needed items.       [facility] 6 Click ADL: 19    Treatment & Education:  · Found seated EOB; sit>supine with SBA.   · Sit>stand EOB>RW with CGA for steadying/safety and verbal cuing for hand placement during transitions though with 0 follow through.  Ambulating short household distance with RW with CGA for steadying/safety bed>bathroom sink>bathroom toilet and return to bed.  Step transfer to toilet with RW and BSC frame over toilet with verbal cuing for safe hand placement 0 follow through during controlled descent though following through with command during stance.   · Voiding less than 100 CC seated at BSC frame over  toilet, able to clean front renata region and place sanitary pad in mesh underwear all in sitting and retrieval of needed items.  CGA for steadying/safety while managing clothing prior to and after toileting task.  Hand hygiene and oral care in stance at sink with good self-initiation of RW positioning at sink and retrieval of all needed items.   · Educated on call light review and importance of calling for assists with general needs and all OOB activity.     Patient left right sidelying with all lines intact, call button in reach, bed alarm on, nursing notified and nursing presentEducation:      GOALS:   Multidisciplinary Problems     Occupational Therapy Goals        Problem: Occupational Therapy    Goal Priority Disciplines Outcome Interventions   Occupational Therapy Goal     OT, PT/OT Ongoing, Progressing    Description: Goals to be met by: 6/1/2022    Patient will increase functional independence with ADLs by performing:      LE Dressing with [place].  Grooming while standing at sink with Supervision.  Toileting from toilet with Supervision for hygiene and clothing management.   Toilet transfer to toilet with Supervision.    Completed Goals:   UE Dressing with [place].  Met 5/27/2022                   Time Tracking:     OT Date of Treatment: 05/29/22  OT Start Time: 1728  OT Stop Time: 1759  OT Total Time (min): 31 min    Billable Minutes:Self Care/Home Management 31    OT/FROILAN: OT          5/29/2022

## 2022-05-30 NOTE — PT/OT/SLP PROGRESS
Occupational Therapy   Treatment    Name: Odette Hart  MRN: [id number]  Admitting Diagnosis:  Acute on chronic systolic heart failure       Recommendations:     Discharge Recommendations: rehabilitation facility (If returning home will require HH OT/PT and increased assistance/supervision from family)  Discharge Equipment Recommendations:  bedside commode, walker, rolling, bath bench (currently needed)  Barriers to discharge:   (current functional level)    Assessment:   Pt. Requiring unilateral UE support during shower to wash perineum.  Cues needed to wash all other body regions while seated with good follow through.  MIN to MOD cuing for cross leg tech to wash legs/feet during task.  Pt. Fatigued after showering task and able to don mesh underwear with CGA for steadying, increased time, and cues for cross leg tech though assist needed to don socks via cross leg tech this day - overall MOD A for LB dress.  Progressing towards goals.  Currently needs RW, BSC, and TTB.  Recommend post acute OT/PT in IRF.  If returning home with require HH OT/PT with increased supervision/assistance from family.  To benefit from continued acute care OT services to increase independence in self-care/functional transfers.  Continue POC.     Odette Hart is a 66 y.o. female with a medical diagnosis of Acute on chronic systolic heart failure.  She presents with below deficits decreasing independence in self-care/functional transfers. Performance deficits affecting function are weakness, impaired endurance, impaired self care skills, impaired functional mobilty, gait instability, impaired balance, decreased safety awareness, pain, impaired skin, decreased upper extremity function, decreased lower extremity function, impaired cardiopulmonary response to activity.     Rehab Prognosis:  Good; patient would benefit from acute skilled OT services to address these deficits and reach maximum level of function.       Plan:     Patient  to be seen 5 x/week to address the above listed problems via self-care/home management, therapeutic activities, therapeutic exercises  · Plan of Care Expires: 06/24/22  · Plan of Care Reviewed with: patient    Subjective     Pain/Comfort:  · Pain Rating 1:  (Not initially rated.)  · Location 1: sacral spine  · Pain Addressed 1: Reposition, Distraction, Cessation of Activity, Nurse notified  · Pain Rating Post-Intervention 1: 4/10 (at rest)    Objective:     Communicated with: Elisabeth Bae RN prior to session.  Patient found right sidelying with peripheral IV, colostomy upon OT entry to room.    General Precautions: Standard, fall (fluid restriction; midline abdominal incision)   Orthopedic Precautions:N/A   Braces: N/A  Respiratory Status: Room air     Occupational Performance:     Bed Mobility:    · Supine<>sit with SBA.      Functional Mobility/Transfers:  · Ambulating short household distance bed>bathroom with RW and CGA for steadying/safety.  Step transfer to standard toilet X 2 trials with use of grab bar for lift/lower and CGA for steadying.  Step transfer to built-in shower chair in walk-in shower with CGA for steadying and MOD cuing for safe sequencing of task.     Activities of Daily Living:  · Voiding seated at toilet and performing front renata hygiene while in stance.  Assist needed to don hospital while seated.  MOD A for LB dress task this day due to fatigue as Pt. Requiring assist to don socks while seated at toilet and requiring increased cuing for cross leg tech, increased time, and CGA for pulling underwear to waist.  Requires cues for washing most body regions while seated and use of cross leg tech to wash legs/feet, assist needed to wash back, and needing LUE support at grab bar while washing front/back renata regions.      [facility] 6 Click ADL: 19    Treatment & Education:  · Supine<>sit with SBA.    · Ambulating short household distance bed>bathroom with RW and CGA for steadying/safety.  Step  transfer to standard toilet X 2 trials with use of grab bar for lift/lower and CGA for steadying.  Step transfer to built-in shower chair in walk-in shower with CGA for steadying and MOD cuing for safe sequencing of task.   · Voiding seated at toilet and performing front renata hygiene while in stance.  Assist needed to don hospital while seated.  MOD A for LB dress task this day due to fatigue as Pt. Requiring assist to don socks while seated at toilet and requiring increased cuing for cross leg tech, increased time, and CGA for pulling underwear to waist.  Requires cues for washing most body regions while seated and use of cross leg tech to wash legs/feet, assist needed to wash back, and needing LUE support at grab bar while washing front/back renata regions.      Patient left HOB elevated with all lines intact, call button in reach, bed alarm on and nursing notifiedEducation:      GOALS:   Multidisciplinary Problems     Occupational Therapy Goals        Problem: Occupational Therapy    Goal Priority Disciplines Outcome Interventions   Occupational Therapy Goal     OT, PT/OT Ongoing, Progressing    Description: Goals to be met by: 6/1/2022    Patient will increase functional independence with ADLs by performing:      LE Dressing with [place].  Grooming while standing at sink with Supervision.  Toileting from toilet with Supervision for hygiene and clothing management.   Toilet transfer to toilet with Supervision.    Completed Goals:   UE Dressing with Gaston.  Met 5/27/2022                   Time Tracking:     OT Date of Treatment: 05/30/22  OT Start Time: 1633  OT Stop Time: 1711  OT Total Time (min): 38 min    Billable Minutes:Self Care/Home Management 38    OT/FROILAN: OT          5/30/2022

## 2022-05-30 NOTE — PROGRESS NOTES
[facility] Medicine  Progress Note    Patient Name: Odette Hart  MRN: [id number]  Patient Class: IP- Inpatient   Admission Date: 5/23/2022  Length of Stay: 5 days  Attending Physician: Sanjuanita Briones MD  Primary Care Provider: Braxton Barragan MD        Subjective:     Principal Problem:Acute on chronic systolic heart failure        HPI:  The patient is a 66 y.o. female with a past medical history of HTN, HLD, CAD, CHF with EF of 20%, MI and ICD who presents with complaint of shortness of breath x 2 weeks. Patient explains that she had an appendectomy, colostomy, cecectomy, and cholecystectomy on 05/09. Since the surgery, she has been recovering in a rehab facility. She states that she has been experiencing shortness of breath on exertion since the surgery. No fever or cough. She reports nausea but no vomiting.  On initial exam, the patient with acute dyspnea with any exertion.  BNP greater than 3000 with peripheral edema.  CXR not very impressive for pulmonary edema.  She will be admitted for further management of her acute on chronic systolic heart failure and Cardiology consult.      Overview/Hospital Course:  Continued on diuresis with improvement in breathing and swelling.Weaned off o2.      Interval History: improved  sob and swelling in legs,able to eat small amounts at times, more stool in bag, weaned to room air, thinks on too much diuretics now.  Review of Systems   Constitutional:  Negative for chills and fever.   HENT:  Negative for trouble swallowing.    Respiratory:  Negative for cough and shortness of breath.    Cardiovascular:  Negative for chest pain and leg swelling.   Gastrointestinal:  Positive for abdominal pain. Negative for blood in stool, nausea and vomiting.   Genitourinary:  Negative for dysuria and hematuria.   Skin:  Negative for rash.   Neurological:  Positive for weakness. Negative for headaches.   Psychiatric/Behavioral:  Negative for confusion.     Objective:     Vital Signs (Most Recent):  Temp: 98.4 °F (36.9 °C) (05/30/22 1531)  Pulse: 85 (05/30/22 1531)  Resp: 18 (05/30/22 1531)  BP: (!) 99/55 (05/30/22 1531)  SpO2: 97 % (05/30/22 1531)   Vital Signs (24h Range):  Temp:  [97.5 °F (36.4 °C)-98.4 °F (36.9 °C)] 98.4 °F (36.9 °C)  Pulse:  [85-94] 85  Resp:  [17-20] 18  SpO2:  [96 %-100 %] 97 %  BP: ()/(51-58) 99/55     Weight: 53.9 kg (118 lb 13.3 oz)  Body mass index is 21.05 kg/m².    Intake/Output Summary (Last 24 hours) at 5/30/2022 1608  Last data filed at 5/30/2022 1300  Gross per 24 hour   Intake 1234.9 ml   Output 650 ml   Net 584.9 ml        Physical Exam  Vitals reviewed.   Constitutional:       General: She is not in acute distress.     Appearance: She is well-developed. She is ill-appearing.   HENT:      Head: Normocephalic and atraumatic.   Eyes:      Extraocular Movements: Extraocular movements intact.      Pupils: Pupils are equal, round, and reactive to light.   Cardiovascular:      Rate and Rhythm: Normal rate and regular rhythm.   Pulmonary:      Effort: Pulmonary effort is normal. No respiratory distress.      Comments: Decreased breath sounds at bases.  Abdominal:      General: Bowel sounds are normal. There is no distension.      Palpations: Abdomen is soft.      Tenderness: There is no abdominal tenderness.      Comments: Midline incision, ostomy with small amount of semi solid stool   Musculoskeletal:         General: No swelling. Normal range of motion.      Cervical back: Normal range of motion and neck supple.      Comments: swelling in both feet much improved   Skin:     General: Skin is warm.   Neurological:      General: No focal deficit present.      Mental Status: She is alert and oriented to person, place, and time.   Psychiatric:         Mood and Affect: Mood normal.         Behavior: Behavior normal.       Significant Labs: All pertinent labs within the past 24 hours have been reviewed.    Significant Imaging: I have  reviewed all pertinent imaging results/findings within the past 24 hours.      Assessment/Plan:      * Acute on chronic systolic heart failure  CXR-  Stable findings of cardiomegaly with mild pulmonary vascular congestion.   BNP- 3340    Consult Cardiology, following  Lasix decreased to 40 mg oral with improved volume status  Also started aldactone 25 mg daily  dvt negative  Improved fluid status   Decrease lasix to 20 mg daily, bp on lower side a    Results for orders placed during the hospital encounter of 05/23/22    Echo    Interpretation Summary  · The left ventricle is severely enlarged with eccentric hypertrophy and severely decreased systolic function.  · Severe left atrial enlargement.  · Grade III left ventricular diastolic dysfunction.  · Normal right ventricular size with normal right ventricular systolic function.  · Mild-to-moderate mitral regurgitation.  · Mild tricuspid regurgitation.  · There is mild pulmonary hypertension.      Chronic hypoxemic respiratory failure  Patient has been on o2 since her dc from last sx  Currently saturating well on o2, wean off as tolerated  On room air for 3 days          CAD (coronary artery disease)  s/p stent in 01/22  On asa and effient, per last cards note till 5/1/22  Resumed and changed to plavix      Hypomagnesemia  Replaced iv  Improved  Replace 5/29      Debility  Therapy eval  Skilled suggested, does not want to go back to same facility  Wants to go to [facility]   Patient decided to go home tomorrow    Anemia  Appears stable, will monitor      Colovesical fistula   On 5/9/22 she underwent cystoscopy with BL ureteral catheters, open sigmoid colon resection, appendectomy, and creation of end colostomy.  Diet as tolerated  Added miralax      Familial hypercholesterolemia  Continue Zetia        VTE Risk Mitigation (From admission, onward)         Ordered     enoxaparin injection 40 mg  Daily         05/26/22 1459     IP VTE HIGH RISK PATIENT  Once          05/24/22 0009     Place sequential compression device  Until discontinued         05/24/22 0009                Discharge Planning   SIVAN:      Code Status: Full Code   Is the patient medically ready for discharge?:     Reason for patient still in hospital (select all that apply): Patient trending condition and Treatment  Discharge Plan A: Skilled Nursing Facility   Discharge Delays: None known at this time              Sanjuanita Briones MD  Department of Hospital Medicine   [facility])

## 2022-05-30 NOTE — ASSESSMENT & PLAN NOTE
CXR-  Stable findings of cardiomegaly with mild pulmonary vascular congestion.   BNP- 3340    Consult Cardiology, following  Lasix decreased to 40 mg oral with improved volume status  Also started aldactone 25 mg daily  dvt negative  Improved fluid status   Decrease lasix to 20 mg daily, bp on lower side a    Results for orders placed during the hospital encounter of 05/23/22    Echo    Interpretation Summary  · The left ventricle is severely enlarged with eccentric hypertrophy and severely decreased systolic function.  · Severe left atrial enlargement.  · Grade III left ventricular diastolic dysfunction.  · Normal right ventricular size with normal right ventricular systolic function.  · Mild-to-moderate mitral regurgitation.  · Mild tricuspid regurgitation.  · There is mild pulmonary hypertension.

## 2022-05-30 NOTE — PLAN OF CARE
Problem: Adult Inpatient Plan of Care  Goal: Plan of Care Review  Outcome: Ongoing, Progressing  Goal: Patient-Specific Goal (Individualized)  Outcome: Ongoing, Progressing  Goal: Absence of Hospital-Acquired Illness or Injury  Outcome: Ongoing, Progressing  Goal: Optimal Comfort and Wellbeing  Outcome: Ongoing, Progressing  Goal: Readiness for Transition of Care  Outcome: Ongoing, Progressing     Problem: Impaired Wound Healing  Goal: Optimal Wound Healing  Outcome: Ongoing, Progressing     Problem: Skin Injury Risk Increased  Goal: Skin Health and Integrity  Outcome: Ongoing, Progressing     Problem: Fall Injury Risk  Goal: Absence of Fall and Fall-Related Injury  Outcome: Ongoing, Progressing

## 2022-05-30 NOTE — PROGRESS NOTES
"[facility]  Cardiology  Progress Note    Patient Name: Odette Hart  MRN: [id number]  Admission Date: 5/23/2022  Hospital Length of Stay: 5 days  Code Status: Full Code   Attending Physician: Sanjuanita Briones MD   Primary Care Physician: Braxton Barragan MD  Expected Discharge Date:   Principal Problem:Acute on chronic systolic heart failure    Subjective:     Brief HPI:    "This 66-year-old female with a known ischemic cardiomyopathy was brought from her nursing with worsening shortness of breath.  Shortness of breath has been worsening for past couple weeks.  She has been in and out the hospital and long-term facilities over the past couple of months due to abdominal problems.  This past January, she underwent implantation of coronary stents after non ST segment elevation myocardial infarction.  She is usually followed by Dr. Arenas.  Efforts at diuresis have been somewhat limited by the patient's relative hypotension".    Hospital Course:     Diuresis.    Interval History:     Weak.    Not SOB.    Review of Systems   Constitutional: Positive for malaise/fatigue. Negative for chills and fever.   HENT: Negative for nosebleeds.    Eyes: Negative for vision loss in left eye and vision loss in right eye.   Cardiovascular: Negative for chest pain, leg swelling, orthopnea, palpitations and paroxysmal nocturnal dyspnea.   Respiratory: Negative for cough, hemoptysis, shortness of breath, sputum production and wheezing.    Hematologic/Lymphatic: Negative for bleeding problem. Does not bruise/bleed easily.   Skin: Negative for color change and rash.   Musculoskeletal: Negative for muscle weakness and myalgias.   Gastrointestinal: Positive for heartburn. Negative for abdominal pain, hematemesis, hematochezia, melena, nausea and vomiting.   Genitourinary: Negative for hematuria.   Neurological: Positive for weakness. Negative for dizziness, focal weakness, headaches, light-headedness and vertigo. "   Psychiatric/Behavioral: Negative for altered mental status. The patient is not nervous/anxious.    Allergic/Immunologic: Negative for persistent infections.     Objective:     Vital Signs (Most Recent):  Temp: 97.5 °F (36.4 °C) (05/30/22 0826)  Pulse: 85 (05/30/22 0826)  Resp: 18 (05/30/22 0826)  BP: (!) 102/58 (05/30/22 0826)  SpO2: 99 % (05/30/22 0826) Vital Signs (24h Range):  Temp:  [97.5 °F (36.4 °C)-98.6 °F (37 °C)] 97.5 °F (36.4 °C)  Pulse:  [85-94] 85  Resp:  [17-20] 18  SpO2:  [96 %-100 %] 99 %  BP: ()/(51-58) 102/58     Weight: 53.9 kg (118 lb 13.3 oz)  Body mass index is 21.05 kg/m².    SpO2: 99 %  O2 Device (Oxygen Therapy): room air      Intake/Output Summary (Last 24 hours) at 5/30/2022 0859  Last data filed at 5/30/2022 0600  Gross per 24 hour   Intake 1204.9 ml   Output 2150 ml   Net -945.1 ml       Lines/Drains/Airways     Drain  Duration                Colostomy 05/09/22 1100 Descending/sigmoid LLQ 20 days          Peripheral Intravenous Line  Duration                Peripheral IV - Single Lumen 05/23/22 1650 22 G Right Hand 6 days                Physical Exam  Constitutional:       General: She is not in acute distress.     Appearance: Normal appearance. She is well-developed. She is not ill-appearing, toxic-appearing or diaphoretic.   HENT:      Head: Normocephalic and atraumatic.      Nose: Nose normal.   Eyes:      General:         Right eye: No discharge.         Left eye: No discharge.      Conjunctiva/sclera:      Right eye: Right conjunctiva is not injected. No hemorrhage.     Left eye: Left conjunctiva is not injected. No hemorrhage.     Pupils: Pupils are equal.      Right eye: Pupil is round.      Left eye: Pupil is round.   Neck:      Thyroid: No thyromegaly.      Vascular: No carotid bruit or JVD.   Cardiovascular:      Rate and Rhythm: Normal rate and regular rhythm.  No extrasystoles are present.     Chest Wall: PMI is not displaced.      Pulses:           Radial pulses are  2+ on the right side and 2+ on the left side.        Femoral pulses are 2+ on the right side and 2+ on the left side.       Dorsalis pedis pulses are 2+ on the right side and 2+ on the left side.        Posterior tibial pulses are 2+ on the right side and 2+ on the left side.      Heart sounds: S1 normal and S2 normal. Murmur heard.     Gallop present. S3 and S4 sounds present.   Pulmonary:      Effort: Pulmonary effort is normal.      Breath sounds: Normal breath sounds.   Chest:      Chest wall: No swelling or tenderness.   Abdominal:      Palpations: Abdomen is soft.      Tenderness: There is no abdominal tenderness.      Comments: Colostomy bag.   Musculoskeletal:      Cervical back: Neck supple.      Right lower leg: Normal. No swelling. No edema.      Left lower leg: Normal. No swelling. No edema.      Right ankle: No swelling.      Left ankle: No swelling.   Lymphadenopathy:      Head:      Right side of head: No submandibular adenopathy.      Left side of head: No submandibular adenopathy.      Cervical: No cervical adenopathy.   Skin:     General: Skin is warm and dry.      Findings: No rash.      Nails: There is no clubbing.   Neurological:      General: No focal deficit present.      Mental Status: She is alert and oriented to person, place, and time. She is not disoriented.      Cranial Nerves: No cranial nerve deficit.   Psychiatric:         Attention and Perception: Attention and perception normal.         Mood and Affect: Affect is blunt.         Current Medications:     aspirin  81 mg Oral Daily    carvediloL  3.125 mg Oral BID    clopidogreL  75 mg Oral Daily    enoxaparin  40 mg Subcutaneous Daily    ezetimibe  10 mg Oral QHS    furosemide  40 mg Oral Daily    polyethylene glycol  17 g Oral Daily    potassium bicarbonate  50 mEq Oral Once    spironolactone  25 mg Oral Daily     Current Laboratory Results:    Recent Results (from the past 24 hour(s))   Basic Metabolic Panel    Collection  Time: 05/30/22  4:44 AM   Result Value Ref Range    Sodium 134 (L) 136 - 145 mmol/L    Potassium 3.4 (L) 3.5 - 5.1 mmol/L    Chloride 94 (L) 95 - 110 mmol/L    CO2 30 (H) 23 - 29 mmol/L    Glucose 107 70 - 110 mg/dL    BUN 11 8 - 23 mg/dL    Creatinine 0.6 0.5 - 1.4 mg/dL    Calcium 8.5 (L) 8.7 - 10.5 mg/dL    Anion Gap 10 8 - 16 mmol/L    eGFR if African American >60 >60 mL/min/1.73 m^2    eGFR if non African American >60 >60 mL/min/1.73 m^2   Magnesium    Collection Time: 05/30/22  4:44 AM   Result Value Ref Range    Magnesium 1.9 1.6 - 2.6 mg/dL   Phosphorus    Collection Time: 05/30/22  4:44 AM   Result Value Ref Range    Phosphorus 2.7 2.7 - 4.5 mg/dL     Current Imaging Results:    US Lower Extremity Veins Bilateral   Final Result      No evidence of deep venous thrombosis in either lower extremity.      Bilateral popliteal fossa cysts.         Electronically signed by: Lucy Tovar MD   Date:    05/24/2022   Time:    21:17      X-Ray Chest AP Portable   Final Result      Stable findings of cardiomegaly with mild pulmonary vascular congestion.         Electronically signed by: Messi Villanueva MD   Date:    05/23/2022   Time:    15:52          Assessment and Plan:     Problem List:    Active Diagnoses:    Diagnosis Date Noted POA    PRINCIPAL PROBLEM:  Acute on chronic systolic heart failure [I50.23] 05/10/2022 Yes    Chronic hypoxemic respiratory failure [J96.11] 05/27/2022 Yes    Hypomagnesemia [E83.42] 05/24/2022 Yes    CAD (coronary artery disease) [I25.10] 05/24/2022 Yes    Debility [R53.81] 05/16/2022 Yes    Anemia [D64.9] 05/07/2022 Yes    Colovesical fistula [N32.1] 02/26/2022 Yes    Familial hypercholesterolemia [E78.01] 01/22/2022 Yes      Problems Resolved During this Admission:    Diagnosis Date Noted Date Resolved POA    Hyperkalemia [E87.5] 05/24/2022 05/26/2022 Yes    Hypotension (arterial) [I95.9] 05/19/2022 05/27/2022 Yes       Assessment and Plan:    Acute on chronic systolic heart  failure   Volume status looks good.   Is not on an ACE-inhibitor or ARB because of documented intolerances.  Does not seem to be true allergies.   Continue with oral diuretics.  Continue carvedilol as blood pressure tolerates.   On carvedilol 3.125 mg Q12, spironolactone 25 mg Q24 and furosemide 40 mg Q24.   Occasional doses of KCl.    At some point would again try at least ARB.    Coronary atherosclerosis   Stable and free of angina.   Prasugrel was changed to clopidogrel to reduce risk of bleeding.       VTE Risk Mitigation (From admission, onward)         Ordered     enoxaparin injection 40 mg  Daily         05/26/22 1459     IP VTE HIGH RISK PATIENT  Once         05/24/22 0009     Place sequential compression device  Until discontinued         05/24/22 0009                Yohannes Cordova MD  Cardiology  [facility] ([facility])

## 2022-05-30 NOTE — SUBJECTIVE & OBJECTIVE
Interval History: improved  sob and swelling in legs,able to eat small amounts at times, more stool in bag, weaned to room air, thinks on too much diuretics now.  Review of Systems   Constitutional:  Negative for chills and fever.   HENT:  Negative for trouble swallowing.    Respiratory:  Negative for cough and shortness of breath.    Cardiovascular:  Negative for chest pain and leg swelling.   Gastrointestinal:  Positive for abdominal pain. Negative for blood in stool, nausea and vomiting.   Genitourinary:  Negative for dysuria and hematuria.   Skin:  Negative for rash.   Neurological:  Positive for weakness. Negative for headaches.   Psychiatric/Behavioral:  Negative for confusion.    Objective:     Vital Signs (Most Recent):  Temp: 98.4 °F (36.9 °C) (05/30/22 1531)  Pulse: 85 (05/30/22 1531)  Resp: 18 (05/30/22 1531)  BP: (!) 99/55 (05/30/22 1531)  SpO2: 97 % (05/30/22 1531)   Vital Signs (24h Range):  Temp:  [97.5 °F (36.4 °C)-98.4 °F (36.9 °C)] 98.4 °F (36.9 °C)  Pulse:  [85-94] 85  Resp:  [17-20] 18  SpO2:  [96 %-100 %] 97 %  BP: ()/(51-58) 99/55     Weight: 53.9 kg (118 lb 13.3 oz)  Body mass index is 21.05 kg/m².    Intake/Output Summary (Last 24 hours) at 5/30/2022 1608  Last data filed at 5/30/2022 1300  Gross per 24 hour   Intake 1234.9 ml   Output 650 ml   Net 584.9 ml        Physical Exam  Vitals reviewed.   Constitutional:       General: She is not in acute distress.     Appearance: She is well-developed. She is ill-appearing.   HENT:      Head: Normocephalic and atraumatic.   Eyes:      Extraocular Movements: Extraocular movements intact.      Pupils: Pupils are equal, round, and reactive to light.   Cardiovascular:      Rate and Rhythm: Normal rate and regular rhythm.   Pulmonary:      Effort: Pulmonary effort is normal. No respiratory distress.      Comments: Decreased breath sounds at bases.  Abdominal:      General: Bowel sounds are normal. There is no distension.      Palpations: Abdomen is  soft.      Tenderness: There is no abdominal tenderness.      Comments: Midline incision, ostomy with small amount of semi solid stool   Musculoskeletal:         General: No swelling. Normal range of motion.      Cervical back: Normal range of motion and neck supple.      Comments: swelling in both feet much improved   Skin:     General: Skin is warm.   Neurological:      General: No focal deficit present.      Mental Status: She is alert and oriented to person, place, and time.   Psychiatric:         Mood and Affect: Mood normal.         Behavior: Behavior normal.       Significant Labs: All pertinent labs within the past 24 hours have been reviewed.    Significant Imaging: I have reviewed all pertinent imaging results/findings within the past 24 hours.

## 2022-05-31 PROBLEM — R63.8 INCREASED NUTRITIONAL NEEDS: Status: ACTIVE | Noted: 2022-01-01

## 2022-05-31 NOTE — PLAN OF CARE
[facility] - [facility] ([facility])      HOME HEALTH ORDERS  FACE TO FACE ENCOUNTER    Patient Name: Odette Hart  YOB: 1955    PCP: Braxton Barragan MD   PCP Address: [address] / [place] [address]  PCP Phone Number: [phone]  PCP Fax: [phone]    Encounter Date: 5/23/22    Admit to Home Health    Diagnoses:  Active Hospital Problems    Diagnosis  POA    *Acute on chronic systolic heart failure [I50.23]  Yes    Chronic hypoxemic respiratory failure [J96.11]  Yes    Hypomagnesemia [E83.42]  Yes    CAD (coronary artery disease) [I25.10]  Yes    Debility [R53.81]  Yes    Anemia [D64.9]  Yes    Colovesical fistula [N32.1]  Yes    Familial hypercholesterolemia [E78.01]  Yes      Resolved Hospital Problems    Diagnosis Date Resolved POA    Hyperkalemia [E87.5] 05/26/2022 Yes    Hypotension (arterial) [I95.9] 05/27/2022 Yes       Follow Up Appointments:  Future Appointments   Date Time Provider Department Center   6/3/2022 11:00 AM Manju Mcduffie MD [facility] [address]   7/7/2022  2:30 PM Freddy Arenas MD [facility]       Allergies:  Review of patient's allergies indicates:   Allergen Reactions    Augmentin [amoxicillin-pot clavulanate] Swelling     Not allergic to amoxicillin just a derivative of augmentin    Lisinopril Other (See Comments)     Fluid around heart    Losartan Other (See Comments)     High potassium    Shellfish containing products Anaphylaxis     Pt.states she is allergic to SEAFOOD since the age of 13    Levaquin [levofloxacin] Other (See Comments)     Very bad joint pain    Clindamycin Palpitations     Chest pain       Medications: Review discharge medications with patient and family and provide education.      Current Discharge Medication List      START taking these medications    Details   clopidogreL (PLAVIX) 75 mg tablet Take 1 tablet (75 mg total) by mouth once daily.  Qty: 30 tablet, Refills: 0         CONTINUE these  medications which have CHANGED    Details   carvediloL (COREG) 3.125 MG tablet Take 1 tablet (3.125 mg total) by mouth 2 (two) times daily with meals.  Qty: 60 tablet, Refills: 0    Comments: .         CONTINUE these medications which have NOT CHANGED    Details   acetaminophen (TYLENOL) 500 MG tablet Take 2 tablets (1,000 mg total) by mouth every 8 (eight) hours as needed for Pain.  Refills: 0      albuterol (PROVENTIL/VENTOLIN HFA) 90 mcg/actuation inhaler Inhale 2 puffs into the lungs every 6 (six) hours as needed for Wheezing. Rescue  Qty: 18 g, Refills: 1    Associated Diagnoses: Bronchitis      ALPRAZolam (XANAX) 0.5 MG tablet Take 1 tablet (0.5 mg total) by mouth nightly as needed for Anxiety.  Qty: 30 tablet, Refills: 2    Associated Diagnoses: Anxiety      aspirin (ECOTRIN) 81 MG EC tablet Take 1 tablet (81 mg total) by mouth once daily.  Qty: 90 tablet, Refills: 0      ezetimibe (ZETIA) 10 mg tablet Take 1 tablet (10 mg total) by mouth every evening.  Qty: 90 tablet, Refills: 3      fluticasone propionate (FLONASE) 50 mcg/actuation nasal spray 1 spray (50 mcg total) by Each Nostril route once daily.  Qty: 9.9 mL, Refills: 1    Associated Diagnoses: Sinusitis, unspecified chronicity, unspecified location      furosemide (LASIX) 20 MG tablet Take 1 tablet (20 mg total) by mouth once daily.  Qty: 30 tablet, Refills: 11      Lactobacillus rhamnosus GG (CULTURELLE) 10 billion cell capsule Take 1 capsule by mouth once daily.      nitroGLYCERIN (NITROSTAT) 0.4 MG SL tablet Place 1 tablet (0.4 mg total) under the tongue every 5 (five) minutes as needed for Chest pain.  Qty: 25 tablet, Refills: 0      polyethylene glycol (GLYCOLAX) 17 gram PwPk Take 17 g by mouth once daily.  Qty: 1 packet, Refills: 0      simethicone (MYLICON) 80 MG chewable tablet Take 80 mg by mouth every 8 (eight) hours as needed.      spironolactone (ALDACTONE) 25 MG tablet Take 0.5 tablets (12.5 mg total) by mouth once daily.  Qty: 15 tablet,  Refills: 11    Comments: .         STOP taking these medications       metoprolol succinate (TOPROL-XL) 25 MG 24 hr tablet Comments:   Reason for Stopping:         oxyCODONE (ROXICODONE) 5 MG immediate release tablet Comments:   Reason for Stopping:         prasugreL (EFFIENT) 10 mg Tab Comments:   Reason for Stopping:                 I have seen and examined this patient within the last 30 days. My clinical findings that support the need for the home health skilled services and home bound status are the following:no   Weakness/numbness causing balance and gait disturbance due to Surgery making it taxing to leave home.     Diet:   cardiac diet      Referrals/ Consults  Physical Therapy to evaluate and treat. Evaluate for home safety and equipment needs; Establish/upgrade home exercise program. Perform / instruct on therapeutic exercises, gait training, transfer training, and Range of Motion.  Occupational Therapy to evaluate and treat. Evaluate home environment for safety and equipment needs. Perform/Instruct on transfers, ADL training, ROM, and therapeutic exercises.   to evaluate for community resources/long-range planning.  Aide to provide assistance with personal care, ADLs, and vital signs.    Activities:   activity as tolerated    Nursing:   Agency to admit patient within 24 hours of hospital discharge unless specified on physician order or at patient request    SN to complete comprehensive assessment including routine vital signs. Instruct on disease process and s/s of complications to report to MD. Review/verify medication list sent home with the patient at time of discharge  and instruct patient/caregiver as needed. Frequency may be adjusted depending on start of care date.     Skilled nurse to perform up to 3 visits PRN for symptoms related to diagnosis    Notify MD if SBP > 160 or < 90; DBP > 90 or < 50; HR > 120 or < 50; Temp > 101; O2 < 88%    Ok to schedule additional visits based on staff  availability and patient request on consecutive days within the home health episode.    When multiple disciplines ordered:    Start of Care occurs on Sunday - Wednesday schedule remaining discipline evaluations as ordered on separate consecutive days following the start of care.    Thursday SOC -schedule subsequent evaluations Friday and Monday the following week.     Friday - Saturday SOC - schedule subsequent discipline evaluations on consecutive days starting Monday of the following week.    For all post-discharge communication and subsequent orders please contact patient's primary care physician.     Wound Care Orders  Sacrum- soak and clean with Vashe wound cleanser. Pat dry. Protect renata-wound with Cavalon.    Apply a nickel thick layer of Santyl to yellow area of wound bed.   Please cover unless having incontinence.  Standard ostomy care instructions    I certify that this patient is confined to her home and needs intermittent skilled nursing care, physical therapy and occupational therapy.

## 2022-05-31 NOTE — PT/OT/SLP PROGRESS
"Physical Therapy Treatment    Patient Name:  Odette Hart   MRN:  [id number]    Recommendations:     Discharge Recommendations:  nursing facility, skilled (though if pt discharges home, will need HHPT and inc family assistance)   Discharge Equipment Recommendations: bedside commode, walker, rolling, bath bench   Barriers to discharge: None    Assessment:     Odette Hart is a 66 y.o. female admitted with a medical diagnosis of Acute on chronic systolic heart failure.  She presents with the following impairments/functional limitations:  weakness, impaired endurance, impaired self care skills, impaired functional mobilty, gait instability, impaired balance, decreased lower extremity function, decreased upper extremity function, decreased safety awareness, pain, impaired skin, impaired cardiopulmonary response to activity ;pt with good mobility, NO LOB noted, mild SOB, O2 sats % on RA, HR:90's.    Rehab Prognosis: Good and Fair; patient would benefit from acute skilled PT services to address these deficits and reach maximum level of function.    Recent Surgery: * No surgery found *      Plan:     During this hospitalization, patient to be seen 5 x/week to address the identified rehab impairments via gait training, therapeutic activities, therapeutic exercises, neuromuscular re-education and progress toward the following goals:    · Plan of Care Expires:  06/24/22    Subjective     Chief Complaint: some pain, and SOB  Patient/Family Comments/goals: "If my oxygen's good, why am I so short of breath". "I know, it's my heart". Pt reports her 19yo granddaughter will be home to help her.  Pain/Comfort:  · Pain Rating 1: 6/10  · Location - Orientation 1: generalized  · Location 1: abdomen  · Pain Addressed 1: Reposition, Distraction  · Pain Rating Post-Intervention 1: 6/10      Objective:     Communicated with nurse prior to session.  Patient found left sidelying with peripheral IV, colostomy upon PT entry to " room.     General Precautions: Standard, fall   Orthopedic Precautions:N/A   Braces: N/A  Respiratory Status: Nasal cannula, flow .5 L/min     Functional Mobility:  · Bed Mobility:     · Supine to Sit: modified independence and supervision  · Transfers:     · Sit to Stand:  supervision with rolling walker  · Gait: amb'd 30' x 2 w/ RW and SBA      AM-PAC 6 CLICK MOBILITY  Turning over in bed (including adjusting bedclothes, sheets and blankets)?: 4  Sitting down on and standing up from a chair with arms (e.g., wheelchair, bedside commode, etc.): 4  Moving from lying on back to sitting on the side of the bed?: 4  Moving to and from a bed to a chair (including a wheelchair)?: 3  Need to walk in hospital room?: 3  Climbing 3-5 steps with a railing?: 3  Basic Mobility Total Score: 21       Therapeutic Activities and Exercises:   pt perf'd doffing/donning of undergarments and hygiene in bathroom w/ SBA/Sup.     Patient left sitting edge of bed with all lines intact, call button in reach, nurse notified and lunch tray present..    GOALS:   Multidisciplinary Problems     Physical Therapy Goals     Not on file          Multidisciplinary Problems (Resolved)        Problem: Physical Therapy    Goal Priority Disciplines Outcome Goal Variances Interventions   Physical Therapy Goal   (Resolved)     PT, PT/OT Met     Description: Goals to be met by: 22    Patient will increase functional independence with mobility by performin. Supine<>sit with supervision without use of hospital bed features utilizing logroll technique.   2. Sit<>stand with supervision with LRAD.  3. Gait x 150 feet with supervision with LRAD.  4. Standing activity x5 min on appropriate supplemental O2 without LI.   5. Ascend/descend 4 step(s) with least restrictive assistive device and B HR with SBA.                    Time Tracking:     PT Received On: 22  PT Start Time: 1330     PT Stop Time: 1350  PT Total Time (min): 20 min     Billable  Minutes: Gait Training 20    Treatment Type: Treatment  PT/PTA: PTA     PTA Visit Number: 4     05/31/2022

## 2022-05-31 NOTE — NURSING
Patient was discharged, reviewed AVS, patient verbally understood and had no concerns, no respiratory distress, and was transported by transportation via wheelchair to friend's car in [facility].

## 2022-05-31 NOTE — PT/OT/SLP PROGRESS
Occupational Therapy      Patient Name:  Odette Hart   MRN:  [id number]    Patient not seen today secondary to Other (Comment) (Pt declined x2 attempts and reporting anticipating d/c later today). 1st attempt- undergoing wound care and need vashe to soak; 2nd attempt requesting to take nap prior to dc.  Will follow-up on next available date if pt remains inpatient.    5/31/2022

## 2022-05-31 NOTE — PLAN OF CARE
05/31/22 1223   Final Note   Assessment Type Final Discharge Note   Anticipated Discharge Disposition Home-Health   Post-Acute Status   Post-Acute Authorization Home Health   Home Health Status Referrals Sent   Discharge Delays None known at this time     Referral sent to [facility] for Home Health set up, spoke with Snehal at [facility] to confirm that she received referral, she will contact me back on placement.     Patient stated that her friend will come pick her up when discharged.

## 2022-05-31 NOTE — PROGRESS NOTES
IP Liaison - Initial Visit Note    Patient: Odette Hart  MRN:  [id number]  Date of Service:  5/31/2022  Completed by:  RADHA Madrigal    Reason for Visit   Patient presents with    IP Liaison Initial Visit       RSW met with patient at bedside in order to complete SDOH questionnaire and liaison assessment.  Pt has identified no barriers to care.    The following were addressed during this visit:  - Review SDOH Questions   - Complete patient assessment   - Complete initial visit with patient        Patient Summary     IP Liaison Patient Assessment    General  Level of Caregiver support: Caregiver currently provides assistance  Have you had to make a decision between paying for any of the following in the last 2 months?: None  Transportation means: Family  Employment status: Disabled  Current symptoms: Sleep disturbances  Assessments  Was the PHQ Depression Screening completed this visit?: Yes  Was the DANIEL-7 Screening completed this visit?: No         RADHA Madrigal

## 2022-05-31 NOTE — PROGRESS NOTES
[facility] - Med Surg ([facility])  Adult Nutrition  Progress Note    SUMMARY       Recommendations  1. Continue low Na diet with FR as tolerated.    - continue Ezra BID mix w 8 fl oz of fluid of choice   -continue small/ frequent meals with the largest meal for lunch  2. If PO intake is miniamal consider Boost Plus daily.   3.Encourage good PO intake as needed.    Goals: Pt to meet % EEN/EPN via PO intake + ONS by RD f/u.  Nutrition Goal Status: new  Communication of RD Recs:  (POC)    Assessment and Plan    Increased nutritional needs  Contributing Nutrition Diagnosis  Increased nutritonal needs    Related to (etiology):   Increase demand for nutrients kcals/ protein (CHF/ wounds)     Signs and Symptoms (as evidenced by):   Small PO intake 25-75%- ostomy   ONS to aid in wound healing  Low Na diet with FR     Interventions/Recommendations (treatment strategy):  Collaboration with other healthcare providers  Low Na; FR 1500mL   ONS    Nutrition Diagnosis Status:   New    Malnutrition Assessment     Skin (Micronutrient):  (Paul Score 16)     Reason for Assessment    Reason For Assessment: RD follow-up  Diagnosis:  (acute on chronic systolic heart failure)  Relevant Medical History:   Past Medical History:   Diagnosis Date    Acute coronary syndrome     Allergy     Arthritis     Cardiomyopathy     CHF (congestive heart failure)     Coronary artery disease     Coronary artery disease of native artery of native heart with stable angina pectoris 4/19/2021 1/24/2022: [facility]: Cath: LAD: Proximal stent patent. LCX: Proximal 80%. LCX: Dominant. Moderate disease. LCX: HEVER 2.75 x 18 mm. Distal dissection. HEVER 2.75 x 22 mm.     Diverticulitis     Diverticulosis     Familial hypercholesterolemia 1/22/2022    Former smoker 1/24/2022    Heart attack     Heart disease     History of myocardial infarction 1/24/2022    Hyperlipidemia     Hypertension     Hypertension 1/24/2022    ICD (implantable  "cardioverter-defibrillator) in place     Non-ST elevation myocardial infarction (NSTEMI) 2/4/2019     Interdisciplinary Rounds: did not attend  General Information Comments: 5/31- RD f/u. Pt resting quietly- explains how she was restless all night. Explains how she needs to get up and get dresses 2/2 hopeful d/c later today. Recently seen patient for FR + low Na diet. No questions/comments/ concerns regarding diet. Gave the patient edu- Colostomy Nutriton Theray 2/2 pt reports complatins of GI issues. Explained tips/ tricks to help patient to help with some of these issues. Minimal PO intake noted- explains has been trying to eat small / frequent meals. Attempting to drink Ezra- explained benefits- states will drink as long as it does not make her nauseated. Will continue to monitor and follow patient.    Nutrition Discharge Planning: Pt to follow a low Na diet with FR as tolerated. - ONS Ezra BID to aid in wound healing.    Nutrition Risk Screen    Nutrition Risk Screen: large or nonhealing wound, burn or pressure injury    Nutrition/Diet History    Spiritual, Cultural Beliefs, Orthodox Practices, Values that Affect Care:  (None stated.)  Factors Affecting Nutritional Intake: altered gastrointestinal function (ostomy)    Anthropometrics    Temp: 98.2 °F (36.8 °C)  Height Method: Stated  Height: 5' 3" (160 cm)  Height (inches): 63 in  Weight Method: Bed Scale  Weight: 53.9 kg (118 lb 13.3 oz)  Weight (lb): 118.83 lb  Ideal Body Weight (IBW), Female: 115 lb  % Ideal Body Weight, Female (lb): 106.96 %  BMI (Calculated): 21.1  BMI Grade: 18.5-24.9 - normal    Lab/Procedures/Meds    Pertinent Labs Reviewed: reviewed  Pertinent Labs Comments: Na 134, Cl 94, H/H 10.3/32.2  Pertinent Medications Reviewed: reviewed  Pertinent Medications Comments: Carvedilol, clopidogrel, collagenase, enoxaparin, ezetimibe, furosemide, polyethylene glycol, spironolactone    Estimated/Assessed Needs    Weight Used For Calorie " Calculations: 53.9 kg (118 lb 13.3 oz)  Energy Calorie Requirements (kcal): 5049-8020 kcal day (25-35 kcal/kg low BMI with wounds)  Energy Need Method: Kcal/kg  Protein Requirements: 65 - 81 g day (1.2-1.5 g/kg wounds/ low BMI)  Weight Used For Protein Calculations: 53.9 kg (118 lb 13.3 oz)  Estimated Fluid Requirement Method: RDA Method  RDA Method (mL): 1300  CHO Requirement: 163- 236 g day    Nutrition Prescription Ordered    Current Diet Order: Low Na; FR 1500mL  Oral Nutrition Supplement: Ezra BID    Evaluation of Received Nutrient/Fluid Intake    Energy Calories Required: not meeting needs  Protein Required: not meeting needs  Fluid Required: not meeting needs  Comments: LBM 5/30  Tolerance: tolerating  % Intake of Estimated Energy Needs: Other: 25-75%  % Meal Intake: Other: 25-75%    Nutrition Risk    Level of Risk/Frequency of Follow-up:  (1-2x weekly)     Monitor and Evaluation    Food and Nutrient Intake: food and beverage intake, energy intake  Food and Nutrient Adminstration: diet order  Knowledge/Beliefs/Attitudes: food and nutrition knowledge/skill  Physical Activity and Function: nutrition-related ADLs and IADLs  Anthropometric Measurements: weight, weight change, body mass index  Biochemical Data, Medical Tests and Procedures: glucose/endocrine profile, gastrointestinal profile, electrolyte and renal panel, inflammatory profile, lipid profile  Nutrition-Focused Physical Findings: overall appearance     Nutrition Follow-Up    RD Follow-up?: Yes

## 2022-05-31 NOTE — PLAN OF CARE
Recommendations  1. Continue low Na diet with FR as tolerated.    - continue Ezra BID mix w 8 fl oz of fluid of choice   -continue small/ frequent meals with the largest meal for lunch  2. If PO intake is miniamal consider Boost Plus daily.   3.Encourage good PO intake as needed.    Goals: Pt to meet % EEN/EPN via PO intake + ONS by RD f/u.  Nutrition Goal Status: new  Communication of RD Recs:  (POC)

## 2022-05-31 NOTE — ASSESSMENT & PLAN NOTE
Contributing Nutrition Diagnosis  Increased nutritonal needs    Related to (etiology):   Increase demand for nutrients kcals/ protein (CHF/ wounds)     Signs and Symptoms (as evidenced by):   Minimal PO intake 25-75%  ONS to aid in wound healing  Low Na diet with FR     Interventions/Recommendations (treatment strategy):  Collaboration with other healthcare providers  Low Na; FR 1500mL   ONS    Nutrition Diagnosis Status:   New

## 2022-05-31 NOTE — PROGRESS NOTES
"[facility]  Cardiology  Progress Note    Patient Name: Odette Hart  MRN: [id number]  Admission Date: 5/23/2022  Hospital Length of Stay: 6 days  Code Status: Full Code   Attending Physician: Elina Moncada MD   Primary Care Physician: Braxton Barragan MD  Expected Discharge Date:   Principal Problem:Acute on chronic systolic heart failure    Subjective:     Brief HPI:    "This 66-year-old female with a known ischemic cardiomyopathy was brought from her nursing with worsening shortness of breath.  Shortness of breath has been worsening for past couple weeks.  She has been in and out the hospital and long-term facilities over the past couple of months due to abdominal problems.  This past January, she underwent implantation of coronary stents after non ST segment elevation myocardial infarction.  She is usually followed by Dr. Arenas.  Efforts at diuresis have been somewhat limited by the patient's relative hypotension".    Hospital Course:     Diuresis.    Interval History:     5/31/2022: Furosemide and spironolactone held due to low blood pressure readings.    Weak.    Not SOB.    Review of Systems   Constitutional: Positive for malaise/fatigue. Negative for chills and fever.   HENT: Negative for nosebleeds.    Eyes: Negative for vision loss in left eye and vision loss in right eye.   Cardiovascular: Negative for chest pain, leg swelling, orthopnea, palpitations and paroxysmal nocturnal dyspnea.   Respiratory: Negative for cough, hemoptysis, shortness of breath, sputum production and wheezing.    Hematologic/Lymphatic: Negative for bleeding problem. Does not bruise/bleed easily.   Skin: Negative for color change and rash.   Musculoskeletal: Negative for muscle weakness and myalgias.   Gastrointestinal: Positive for heartburn. Negative for abdominal pain, hematemesis, hematochezia, melena, nausea and vomiting.   Genitourinary: Negative for hematuria.   Neurological: Positive for weakness. " Negative for dizziness, focal weakness, headaches, light-headedness and vertigo.   Psychiatric/Behavioral: Negative for altered mental status. The patient is not nervous/anxious.    Allergic/Immunologic: Negative for persistent infections.     Objective:     Vital Signs (Most Recent):  Temp: 98.2 °F (36.8 °C) (05/31/22 0850)  Pulse: 89 (05/31/22 1230)  Resp: 17 (05/31/22 0543)  BP: (!) 103/51 (05/31/22 1248)  SpO2: 100 % (05/31/22 1248) Vital Signs (24h Range):  Temp:  [98.2 °F (36.8 °C)-99 °F (37.2 °C)] 98.2 °F (36.8 °C)  Pulse:  [85-99] 89  Resp:  [17-18] 17  SpO2:  [94 %-100 %] 100 %  BP: ()/(49-58) 103/51     Weight: 53.9 kg (118 lb 13.3 oz)  Body mass index is 21.05 kg/m².    SpO2: 100 %  O2 Device (Oxygen Therapy): nasal cannula (0.5 L)      Intake/Output Summary (Last 24 hours) at 5/31/2022 1356  Last data filed at 5/31/2022 0334  Gross per 24 hour   Intake 240 ml   Output 500 ml   Net -260 ml       Lines/Drains/Airways     Drain  Duration                Colostomy 05/09/22 1100 Descending/sigmoid LLQ 22 days          Peripheral Intravenous Line  Duration                Peripheral IV - Single Lumen 05/23/22 1650 22 G Right Hand 7 days                Physical Exam  Constitutional:       General: She is not in acute distress.     Appearance: Normal appearance. She is well-developed. She is not ill-appearing, toxic-appearing or diaphoretic.   HENT:      Head: Normocephalic and atraumatic.      Nose: Nose normal.   Eyes:      General:         Right eye: No discharge.         Left eye: No discharge.      Conjunctiva/sclera:      Right eye: Right conjunctiva is not injected. No hemorrhage.     Left eye: Left conjunctiva is not injected. No hemorrhage.     Pupils: Pupils are equal.      Right eye: Pupil is round.      Left eye: Pupil is round.   Neck:      Thyroid: No thyromegaly.      Vascular: No carotid bruit or JVD.   Cardiovascular:      Rate and Rhythm: Normal rate and regular rhythm.  No extrasystoles  are present.     Chest Wall: PMI is not displaced.      Pulses:           Radial pulses are 2+ on the right side and 2+ on the left side.        Femoral pulses are 2+ on the right side and 2+ on the left side.       Dorsalis pedis pulses are 2+ on the right side and 2+ on the left side.        Posterior tibial pulses are 2+ on the right side and 2+ on the left side.      Heart sounds: S1 normal and S2 normal. Murmur heard.     Gallop present. S3 and S4 sounds present.   Pulmonary:      Effort: Pulmonary effort is normal.      Breath sounds: Normal breath sounds.   Chest:      Chest wall: No swelling or tenderness.   Abdominal:      Palpations: Abdomen is soft.      Tenderness: There is no abdominal tenderness.      Comments: Colostomy bag.   Musculoskeletal:      Cervical back: Neck supple.      Right lower leg: Normal. No swelling. No edema.      Left lower leg: Normal. No swelling. No edema.      Right ankle: No swelling.      Left ankle: No swelling.   Lymphadenopathy:      Head:      Right side of head: No submandibular adenopathy.      Left side of head: No submandibular adenopathy.      Cervical: No cervical adenopathy.   Skin:     General: Skin is warm and dry.      Findings: No rash.      Nails: There is no clubbing.   Neurological:      General: No focal deficit present.      Mental Status: She is alert and oriented to person, place, and time. She is not disoriented.      Cranial Nerves: No cranial nerve deficit.   Psychiatric:         Attention and Perception: Attention and perception normal.         Mood and Affect: Affect is blunt.         Current Medications:     aspirin  81 mg Oral Daily    carvediloL  3.125 mg Oral BID    clopidogreL  75 mg Oral Daily    collagenase   Topical (Top) Daily    enoxaparin  40 mg Subcutaneous Daily    ezetimibe  10 mg Oral QHS    furosemide  20 mg Oral Daily    polyethylene glycol  17 g Oral Daily    spironolactone  25 mg Oral Daily     Current Laboratory  Results:    Recent Results (from the past 24 hour(s))   Basic Metabolic Panel    Collection Time: 05/31/22  6:23 AM   Result Value Ref Range    Sodium 134 (L) 136 - 145 mmol/L    Potassium 4.8 3.5 - 5.1 mmol/L    Chloride 94 (L) 95 - 110 mmol/L    CO2 29 23 - 29 mmol/L    Glucose 98 70 - 110 mg/dL    BUN 12 8 - 23 mg/dL    Creatinine 0.6 0.5 - 1.4 mg/dL    Calcium 9.1 8.7 - 10.5 mg/dL    Anion Gap 11 8 - 16 mmol/L    eGFR if African American >60 >60 mL/min/1.73 m^2    eGFR if non African American >60 >60 mL/min/1.73 m^2   Magnesium    Collection Time: 05/31/22  6:23 AM   Result Value Ref Range    Magnesium 1.8 1.6 - 2.6 mg/dL     Current Imaging Results:    US Lower Extremity Veins Bilateral   Final Result      No evidence of deep venous thrombosis in either lower extremity.      Bilateral popliteal fossa cysts.         Electronically signed by: Lucy Tovar MD   Date:    05/24/2022   Time:    21:17      X-Ray Chest AP Portable   Final Result      Stable findings of cardiomegaly with mild pulmonary vascular congestion.         Electronically signed by: Messi Villanueva MD   Date:    05/23/2022   Time:    15:52          Assessment and Plan:     Problem List:    Active Diagnoses:    Diagnosis Date Noted POA    PRINCIPAL PROBLEM:  Acute on chronic systolic heart failure [I50.23] 05/10/2022 Yes    Increased nutritional needs [R63.8] 05/31/2022 Yes    Chronic hypoxemic respiratory failure [J96.11] 05/27/2022 Yes    Hypomagnesemia [E83.42] 05/24/2022 Yes    CAD (coronary artery disease) [I25.10] 05/24/2022 Yes    Debility [R53.81] 05/16/2022 Yes    Anemia [D64.9] 05/07/2022 Yes    Colovesical fistula [N32.1] 02/26/2022 Yes    Familial hypercholesterolemia [E78.01] 01/22/2022 Yes      Problems Resolved During this Admission:    Diagnosis Date Noted Date Resolved POA    Hyperkalemia [E87.5] 05/24/2022 05/26/2022 Yes    Hypotension (arterial) [I95.9] 05/19/2022 05/27/2022 Yes       Assessment and Plan:    Acute on  chronic systolic heart failure   Volume status looks good.   Is not on an ACE-inhibitor or ARB because of documented intolerances.  Does not seem to be true allergies.   Continue with oral diuretics.  Continue carvedilol as blood pressure tolerates.   On carvedilol 3.125 mg Q12, spironolactone 25 mg Q24 and furosemide 40 mg Q24.   5/31/2022: Furosemide and spironolactone held due to low blood pressure readings.   Occasional doses of KCl.    At some point would again try at least ARB.    Coronary atherosclerosis   Stable and free of angina.   Prasugrel was changed to clopidogrel to reduce risk of bleeding.       VTE Risk Mitigation (From admission, onward)         Ordered     enoxaparin injection 40 mg  Daily         05/26/22 6859     IP VTE HIGH RISK PATIENT  Once         05/24/22 0009     Place sequential compression device  Until discontinued         05/24/22 0009                Yohannes Cordova MD  Cardiology  [facility] - [facility] ([facility])

## 2022-05-31 NOTE — PROGRESS NOTES
[facility] - Med Surg [facility])  Wound Care    Patient Name:  Odette Hart   MRN:  [id number]  Date: 2022  Diagnosis: Acute on chronic systolic heart failure    History:     Past Medical History:   Diagnosis Date    Acute coronary syndrome     Allergy     Arthritis     Cardiomyopathy     CHF (congestive heart failure)     Coronary artery disease     Coronary artery disease of native artery of native heart with stable angina pectoris 2021: [facility]: Cath: LAD: Proximal stent patent. LCX: Proximal 80%. LCX: Dominant. Moderate disease. LCX: HEVER 2.75 x 18 mm. Distal dissection. HEVER 2.75 x 22 mm.     Diverticulitis     Diverticulosis     Familial hypercholesterolemia 2022    Former smoker 2022    Heart attack     Heart disease     History of myocardial infarction 2022    Hyperlipidemia     Hypertension     Hypertension 2022    ICD (implantable cardioverter-defibrillator) in place     Non-ST elevation myocardial infarction (NSTEMI) 2019       Social History     Socioeconomic History    Marital status:    Tobacco Use    Smoking status: Former Smoker     Packs/day: 0.50     Start date: 1976     Quit date: 2012     Years since quittin.4    Smokeless tobacco: Never Used   Substance and Sexual Activity    Alcohol use: No    Drug use: No       Precautions:     Allergies as of 2022 - Reviewed 2022   Allergen Reaction Noted    Augmentin [amoxicillin-pot clavulanate] Swelling 2018    Lisinopril Other (See Comments) 2018    Losartan Other (See Comments) 2018    Shellfish containing products Anaphylaxis 09/10/2018    Levaquin [levofloxacin] Other (See Comments) 2018    Clindamycin Palpitations 2019       WOC Assessment Details/Treatment   Wound care follow up:  Colostomy appliance is intact and she has not functioned since it was changed yesterday.  Pt participated in pouch change and feels more accepting of ostomy  care.  Sacral ulcer is smaller since last assessment , but remains slough filled. Santyl and sacral foam in use.   Supplies for home use and will have  care for assistance.     05/31/22 1030        Colostomy 05/09/22 1100 Descending/sigmoid LLQ   Placement Date/Time: 05/09/22 1100   Inserted by: MD  Colostomy Type: Descending/sigmoid  Location: LLQ   Stomal Appliance 1 piece;Clean;Intact;No Leakage   Stoma Appearance red;oval;rosebud appearance   Stoma Size (in) 35mm oval   Site Assessment Red;Oval;Rosebud appearance   Peristomal Assessment KEO   Stoma Function flatus  (pouch is empty at this time)        Altered Skin Integrity 05/09/22 1630 Sacral spine Full thickness tissue loss. Base is covered by slough and/or eschar in the wound bed   Date First Assessed/Time First Assessed: 05/09/22 1630   Altered Skin Integrity Present on Admission: yes  Location: Sacral spine  Description of Altered Skin Integrity: Full thickness tissue loss. Base is covered by slough and/or eschar in the wound bed   Wound Image    Description of Altered Skin Integrity Full thickness tissue loss. Base is covered by slough and/or eschar in the wound bed   Dressing Appearance Clean;Intact   Drainage Amount None   Drainage Characteristics/Odor No odor   Appearance Slough;Yellow;White;Moist   Tissue loss description Full thickness   Yellow (%), Wound Tissue Color 100 %   Periwound Area Redness   Wound Edges Open   Wound Length (cm) 3 cm   Wound Width (cm) 1.8 cm   Wound Depth (cm) 0.2 cm   Wound Volume (cm^3) 1.08 cm^3   Wound Surface Area (cm^2) 5.4 cm^2   Care Cleansed with:;Wound cleanser;Applied:;Skin Barrier  (Vashe, cavilon to wound edges)   Dressing Applied;Silicone;Foam  (santyl and mepilex)       05/31/2022

## 2022-05-31 NOTE — NURSING
Patient refused diuretics and pain med today  Due to concern of bowel movements, gave her miralax as ordered     Will continue to monitor

## 2022-05-31 NOTE — DISCHARGE SUMMARY
"[facility] Medicine  Discharge Summary      Patient Name: Odette Hart  MRN: [id number]  Patient Class: IP- Inpatient  Admission Date: 5/23/2022  Hospital Length of Stay: 6 days  Discharge Date and Time: 5/31/2022  3:47 PM  Attending Physician: No att. providers found   Discharging Provider: Elina Lyons MD  Primary Care Provider: Braxton Barragan MD      HPI:   From H&P by Michael Long NP:  "The patient is a 66 y.o. female with a past medical history of HTN, HLD, CAD, CHF with EF of 20%, MI and ICD who presents with complaint of shortness of breath x 2 weeks. Patient explains that she had an appendectomy, colostomy, cecectomy, and cholecystectomy on 05/09. Since the surgery, she has been recovering in a rehab facility. She states that she has been experiencing shortness of breath on exertion since the surgery. No fever or cough. She reports nausea but no vomiting.  On initial exam, the patient with acute dyspnea with any exertion.  BNP greater than 3000 with peripheral edema.  CXR not very impressive for pulmonary edema.  She will be admitted for further management of her acute on chronic systolic heart failure and Cardiology consult."            Hospital Course:   Patient was admitted for IV diuresis, which was difficult as she has relative hypotension.  She had a good improvement in breathing and edema and was weaned off oxygen supplementation.  She was seen by cardiology and 2D echo was done with summary below:    Summary    · The left ventricle is severely enlarged with eccentric hypertrophy and severely decreased systolic function.  · Severe left atrial enlargement.  · Grade III left ventricular diastolic dysfunction.  · Normal right ventricular size with normal right ventricular systolic function.  · Mild-to-moderate mitral regurgitation.  · Mild tricuspid regurgitation.  · There is mild pulmonary hypertension.    She was continued on low dose carvedilol, furosemide " and spironolactone while she was here.  She was not on an ARB or Ace due to documented intolerance to both.  Diuresis was difficult due to her relative hypotension, and on the day of discharge her furosemide was held as she felt weak (although still felt comfortable with being discharged).  She will need close follow up as outpatient.  Home health was ordered as well as DME (bedside commode, rolling walker, bath bench).         Goals of Care Treatment Preferences:  Code Status: Full Code      Consults:   Consults (From admission, onward)        Status Ordering Provider     Inpatient consult to Cardiology  Once        Provider:  Vince Lopez MD    Completed GAVI ROMANO     Inpatient consult to Social Work/Case Management  Once        Provider:  (Not yet assigned)    Completed GAVI ROMANO     Inpatient consult to Registered Dietitian/Nutritionist  Once        Provider:  (Not yet assigned)    Completed GAVI ROMANO     IP consult to case management  Once        Provider:  (Not yet assigned)    Completed GAVI ROMANO            Final Active Diagnoses:    Diagnosis Date Noted POA    PRINCIPAL PROBLEM:  Acute on chronic systolic heart failure [I50.23] 05/10/2022 Yes    Increased nutritional needs [R63.8] 05/31/2022 Yes    Chronic hypoxemic respiratory failure [J96.11] 05/27/2022 Yes    Hypomagnesemia [E83.42] 05/24/2022 Yes    CAD (coronary artery disease) [I25.10] 05/24/2022 Yes    Debility [R53.81] 05/16/2022 Yes    Anemia [D64.9] 05/07/2022 Yes    Colovesical fistula [N32.1] 02/26/2022 Yes    Familial hypercholesterolemia [E78.01] 01/22/2022 Yes      Problems Resolved During this Admission:    Diagnosis Date Noted Date Resolved POA    Hyperkalemia [E87.5] 05/24/2022 05/26/2022 Yes    Hypotension (arterial) [I95.9] 05/19/2022 05/27/2022 Yes       Discharged Condition: stable    Disposition: Home-Health Care Svc    Follow Up:   Follow-up Information     Freddy Arenas MD  "Follow up.    Specialty: Cardiology  Why: Follow up for the next available appointment  Contact information:  [address]  [address]  [facility] [address]  [phone]             Manju Mcduffie MD Follow up.    Specialty: Rheumatology  Why: As scheduled 6/3/2022  Contact information:  [address]  [address]  [place] [address]  [phone]             Braxton Barragan MD Follow up.    Specialties: Internal Medicine, Pediatrics  Why: Follow up in 1-2 weeks  Contact information:  [address] [place] [address]  [phone]             [facility] Follow up.    Why: For Home Health  Contact information:  [address]  [facility] [address]  [phone]                     Patient Instructions:      WALKER FOR HOME USE     Order Specific Question Answer Comments   Type of Walker: Adult (5'4"-6'6")    With wheels? Yes    Height: 5' 3" (1.6 m)    Weight: 53.9 kg (118 lb 13.3 oz)    Length of need (1-99 months): 99    Does patient have medical equipment at home? none    Please check all that apply: Patient's condition impairs ambulation.    Please check all that apply: Patient is unable to safely ambulate without equipment.    Please check all that apply: Patient needs help to get in and out of chair.      COMMODE FOR HOME USE     Order Specific Question Answer Comments   Type: Standard    Height: 5' 3" (1.6 m)    Weight: 53.9 kg (118 lb 13.3 oz)    Does patient have medical equipment at home? none    Length of need (1-99 months): 99      TRANSFER TUB BENCH FOR HOME USE   Order Comments: Call son for payment:   Telly Ortiz  [phone]     Order Specific Question Answer Comments   Type of Transfer Tub Bench: Unpadded    Height: 5' 3" (1.6 m)    Weight: 53.9 kg (118 lb 13.3 oz)    Does patient have medical equipment at home? none    Length of need (1-99 months): 99    Patient notified - Not covered by insurance considered a convenience item Yes    Discussed financial " responsibility with responsible party Yes      Diet Cardiac     Activity as tolerated       Medications:  Reconciled Home Medications:      Medication List      START taking these medications    clopidogreL 75 mg tablet  Commonly known as: PLAVIX  Take 1 tablet (75 mg total) by mouth once daily.  Start taking on: June 1, 2022        CHANGE how you take these medications    carvediloL 3.125 MG tablet  Commonly known as: COREG  Take 1 tablet (3.125 mg total) by mouth 2 (two) times daily with meals.  What changed:   · medication strength  · how much to take        CONTINUE taking these medications    acetaminophen 500 MG tablet  Commonly known as: TYLENOL  Take 2 tablets (1,000 mg total) by mouth every 8 (eight) hours as needed for Pain.     albuterol 90 mcg/actuation inhaler  Commonly known as: PROVENTIL/VENTOLIN HFA  Inhale 2 puffs into the lungs every 6 (six) hours as needed for Wheezing. Rescue     ALPRAZolam 0.5 MG tablet  Commonly known as: XANAX  Take 1 tablet (0.5 mg total) by mouth nightly as needed for Anxiety.     aspirin 81 MG EC tablet  Commonly known as: ECOTRIN  Take 1 tablet (81 mg total) by mouth once daily.     ezetimibe 10 mg tablet  Commonly known as: ZETIA  Take 1 tablet (10 mg total) by mouth every evening.     fluticasone propionate 50 mcg/actuation nasal spray  Commonly known as: FLONASE  1 spray (50 mcg total) by Each Nostril route once daily.     furosemide 20 MG tablet  Commonly known as: LASIX  Take 1 tablet (20 mg total) by mouth once daily.     Lactobacillus rhamnosus GG 10 billion cell capsule  Commonly known as: CULTURELLE  Take 1 capsule by mouth once daily.     nitroGLYCERIN 0.4 MG SL tablet  Commonly known as: NITROSTAT  Place 1 tablet (0.4 mg total) under the tongue every 5 (five) minutes as needed for Chest pain.     polyethylene glycol 17 gram Pwpk  Commonly known as: GLYCOLAX  Take 17 g by mouth once daily.     simethicone 80 MG chewable tablet  Commonly known as: MYLICON  Take 80  mg by mouth every 8 (eight) hours as needed.     spironolactone 25 MG tablet  Commonly known as: ALDACTONE  Take 0.5 tablets (12.5 mg total) by mouth once daily.        STOP taking these medications    metoprolol succinate 25 MG 24 hr tablet  Commonly known as: TOPROL-XL     oxyCODONE 5 MG immediate release tablet  Commonly known as: ROXICODONE     prasugreL 10 mg Tab  Commonly known as: EFFIENT            Indwelling Lines/Drains at time of discharge:   Lines/Drains/Airways     Drain  Duration                Colostomy 05/09/22 1100 Descending/sigmoid LLQ 22 days                Time spent on the discharge of patient: >30 minutes         Elina Lyons MD  Department of Hospital Medicine  [facility] ([facility])

## 2022-06-01 NOTE — PROGRESS NOTES
IP Liaison - Final Visit Note    Patient: Odette Hart  MRN:  [id number]  Date of Service:  6/1/2022  Completed by:  RADHA Madrigal    Reason for Visit   Patient presents with    IP Liaison Chart Review       Patient's granddaughter will make pcp appointment.    Patient Summary     Discharge Date: 5/31/2022  Discharge telephone number/address:[phone] /  [address] [place] [address]  Follow up provider: Manju Mcduffie MD  Follow up appointments: 6/3/2022, 11:00am  Home Health agency & telephone number: [facility] agency  DME ordered &  name: [facility]  Assigned OPCM RN/SW: n/a  Report sent to follow up team (PCP/OPCM) via in basket message: n/a  Community Resources arranged including agency name & contact info: No support & services have been documented.    RADHA Madrigal

## 2022-06-01 NOTE — TELEPHONE ENCOUNTER
Called patient to schedule 2 week post op. No answer. Left message to please call back to confirm date and time okay. Appointment slip mailed as well.

## 2022-06-01 NOTE — PROGRESS NOTES
C3 nurse spoke with Odette Hart   for a TCC post hospital discharge follow up call. The patient has a scheduled HOSFU appointment with Braxton Barragan MD   on 6/7/2022 @ 2:15.      jacques Varner, is going  prescription, Plavix, today at [facility] pharmacy. Patient states she will take 1st dose today.    Patient is upset as she does not have information about Home Health that was ordered. C3 Nurse called [facility] and left VM with patient concerns asking them to reach out to patient.    HOSFU appointment with pcp created by C3 nurse and details provided to Telly hanna, who verbalized understanding.

## 2022-06-02 NOTE — PLAN OF CARE
Spoke with patient about Home Health set up with [facility], patient stated that nobody has called her about her home health.    I contacted Miguelina with [facility] and was informed that they are having a hard time finding an home health agency because they also need a wound care nurse. I let her know that the patient has been inquiring about the status. Miguelina stated that they are still working on it and she or the care coordinator will call me back with an update.     I spoke with the patient and informed her of everything Miguelina told me and gave her [facility]s light phone number so she can call and continue to check on the status of her home health set up.

## 2022-06-07 NOTE — TELEPHONE ENCOUNTER
----- Message from Pamela Kaiser sent at 6/7/2022  8:31 AM CDT -----  Contact: [facility] phone [phone]  Calling to change patients appointment today to a virtual appointment. Please call the patient. Thanks.

## 2022-06-07 NOTE — TELEPHONE ENCOUNTER
Spoke with pt. Changed in person visit to a Virtual visit this afternoon - pt. Went to the ER at [facility] about a month ago on 5/6 winded up with emergency surgery - for a colovesical fistula repair, appendectomy, cholecystectomy and partial colectomy. Recently notified she was also in Heart Failure. She has f/u with Cardiology in July. Yesterday, Went to the ED and was told her incision looked fine. Pictures in that evaluation - as the middle incision looks infected they told her it was okay. Respirations 26. Also, told she has a UTI. Pt. Also has a bed sore on her sacrum. HH is coming now to evaluate her bed sore- pt. Does not see colorectal until the 17th of June and is about to run out of bags.

## 2022-06-07 NOTE — PROGRESS NOTES
Subjective:    The patient location is: home  The chief complaint leading to consultation is: abd pain    Visit type: audio only    Face to Face time with patient: 25  minutes of total time spent on the encounter, which includes face to face time and non-face to face time preparing to see the patient (eg, review of tests), Obtaining and/or reviewing separately obtained history, Documenting clinical information in the electronic or other health record, Independently interpreting results (not separately reported) and communicating results to the patient/family/caregiver, or Care coordination (not separately reported).         Each patient to whom he or she provides medical services by telemedicine is:  (1) informed of the relationship between the physician and patient and the respective role of any other health care provider with respect to management of the patient; and (2) notified that he or she may decline to receive medical services by telemedicine and may withdraw from such care at any time.    Notes:    Patient ID: Odette Hart is a 66 y.o. female.    Chief Complaint: No chief complaint on file.    HPI  Pt with multiple medical conditions was just seen in ER yesterday for abd pain send home any any new medication or anything for pain her pain in epigastric area nausea no vomitting no diarrhea she has colostomy review medical records  She had cholecystectomy colon vesicular fistula repair h/o MI ischemic cardiomyopathy CHF s/p AICD placement her main symptoms today is abd pain and she not given any med for pain she denies sob cp no edema  Her labs in ER were stable and procalcitonin negative  Review of Systems    Objective:      Physical Exam   Pt sound she is in apin does not want to go back to ER nausea no vomitting   Assessment:       1. Epigastric pain    2. Colovesical fistula    3. S/P colostomy    4. AICD (automatic cardioverter/defibrillator) present    5. Congestive heart failure with left  ventricular diastolic dysfunction, NYHA class 2        Plan:       Epigastric pain  Comments:  will treat with pain med and antiacid if not better or fever no intractable N/V need to go to ER keep apptw ith surgery  Orders:  -     pantoprazole (PROTONIX) 40 MG tablet; Take 1 tablet (40 mg total) by mouth once daily.  Dispense: 30 tablet; Refill: 1  -     HYDROcodone-acetaminophen (NORCO) 5-325 mg per tablet; Take 1 tablet by mouth every 12 (twelve) hours as needed for Pain.  Dispense: 16 tablet; Refill: 0    Colovesical fistula  Comments:  s/p repair    S/P colostomy  Comments:  stool present and normal    AICD (automatic cardioverter/defibrillator) present    Congestive heart failure with left ventricular diastolic dysfunction, NYHA class 2  Comments:  no symptoms of decomsation        Medication List with Changes/Refills   New Medications    HYDROCODONE-ACETAMINOPHEN (NORCO) 5-325 MG PER TABLET    Take 1 tablet by mouth every 12 (twelve) hours as needed for Pain.    PANTOPRAZOLE (PROTONIX) 40 MG TABLET    Take 1 tablet (40 mg total) by mouth once daily.   Current Medications    ACETAMINOPHEN (TYLENOL) 500 MG TABLET    Take 2 tablets (1,000 mg total) by mouth every 8 (eight) hours as needed for Pain.    ALBUTEROL (PROVENTIL/VENTOLIN HFA) 90 MCG/ACTUATION INHALER    Inhale 2 puffs into the lungs every 6 (six) hours as needed for Wheezing. Rescue    ALPRAZOLAM (XANAX) 0.5 MG TABLET    Take 1 tablet (0.5 mg total) by mouth nightly as needed for Anxiety.    ASPIRIN (ECOTRIN) 81 MG EC TABLET    Take 1 tablet (81 mg total) by mouth once daily.    CARVEDILOL (COREG) 3.125 MG TABLET    Take 1 tablet (3.125 mg total) by mouth 2 (two) times daily with meals.    CLOPIDOGREL (PLAVIX) 75 MG TABLET    Take 1 tablet (75 mg total) by mouth once daily.    EZETIMIBE (ZETIA) 10 MG TABLET    Take 1 tablet (10 mg total) by mouth every evening.    FLUTICASONE PROPIONATE (FLONASE) 50 MCG/ACTUATION NASAL SPRAY    1 spray (50 mcg total) by  Each Nostril route once daily.    FUROSEMIDE (LASIX) 20 MG TABLET    Take 1 tablet (20 mg total) by mouth once daily.    LACTOBACILLUS RHAMNOSUS GG (CULTURELLE) 10 BILLION CELL CAPSULE    Take 1 capsule by mouth once daily.    NITROFURANTOIN, MACROCRYSTAL-MONOHYDRATE, (MACROBID) 100 MG CAPSULE    Take 1 capsule (100 mg total) by mouth 2 (two) times daily. for 5 days    NITROGLYCERIN (NITROSTAT) 0.4 MG SL TABLET    Place 1 tablet (0.4 mg total) under the tongue every 5 (five) minutes as needed for Chest pain.    POLYETHYLENE GLYCOL (GLYCOLAX) 17 GRAM PWPK    Take 17 g by mouth once daily.    SIMETHICONE (MYLICON) 80 MG CHEWABLE TABLET    Take 80 mg by mouth every 8 (eight) hours as needed.    SPIRONOLACTONE (ALDACTONE) 25 MG TABLET    Take 0.5 tablets (12.5 mg total) by mouth once daily.

## 2022-06-08 PROBLEM — J44.1 ACUTE EXACERBATION OF CHRONIC OBSTRUCTIVE PULMONARY DISEASE (COPD): Status: ACTIVE | Noted: 2022-03-23

## 2022-06-08 PROBLEM — T81.31XA SUPERFICIAL DEHISCENCE OF OPERATION WOUND: Status: ACTIVE | Noted: 2022-01-01

## 2022-06-08 NOTE — ED PROVIDER NOTES
"Encounter Date: 6/8/2022    SCRIBE #1 NOTE: I, Daphnie Sims, am scribing for, and in the presence of, Analy Stern MD.       History     Chief Complaint   Patient presents with    multiple complaints     Pt seen yesterday at [facility] for pain to ostomy site and D/C, pt also has Hx of CHF and wants to be checked for fluid, complains of SOB     Odette Hart is a 66 y.o. female, with a PMHx of HTN, HLD, CAD, CHF (EF 20%), MI and ICD, who presents to the ED with shortness of breath. Patient reports she started experiencing a sudden onset of persistent, moderate shortness of breath tonight prior to arrival while "doing nothing". She also notes worsening shortness of breath on exertion. She also reports having a colostomy bag placed in on 5/7/22, and has been experiencing pain around her colostomy site for the last several days as well. Currently reports pain a 10/10 in severity. She also notes 2 episodes of vomiting today. No other exacerbating or alleviating factors. Patient is on Plavix. She states she has been taking her Lasix. Denies home O2 use. Denies leg swelling, or other associated symptoms.     The history is provided by the patient.     Review of patient's allergies indicates:   Allergen Reactions    Augmentin [amoxicillin-pot clavulanate] Swelling     Not allergic to amoxicillin just a derivative of augmentin    Lisinopril Other (See Comments)     Fluid around heart    Losartan Other (See Comments)     High potassium    Shellfish containing products Anaphylaxis     Pt.states she is allergic to SEAFOOD since the age of 13    Levaquin [levofloxacin] Other (See Comments)     Very bad joint pain    Clindamycin Palpitations     Chest pain     Past Medical History:   Diagnosis Date    Acute coronary syndrome     Allergy     Arthritis     Cardiomyopathy     CHF (congestive heart failure)     Coronary artery disease     Coronary artery disease of native artery of native heart with stable angina " pectoris 2021: [facility]: Cath: LAD: Proximal stent patent. LCX: Proximal 80%. LCX: Dominant. Moderate disease. LCX: HEVER 2.75 x 18 mm. Distal dissection. HEVER 2.75 x 22 mm.     Diverticulitis     Diverticulosis     Familial hypercholesterolemia 2022    Former smoker 2022    Heart attack     Heart disease     History of myocardial infarction 2022    Hyperlipidemia     Hypertension     Hypertension 2022    ICD (implantable cardioverter-defibrillator) in place     Non-ST elevation myocardial infarction (NSTEMI) 2019     Past Surgical History:   Procedure Laterality Date    APPENDECTOMY N/A 2022    Procedure: APPENDECTOMY;  Surgeon: Rafa Cardozo MD;  Location: [facility];  Service: Colon and Rectal;  Laterality: N/A;    ATRIAL CARDIAC PACEMAKER INSERTION      CECECTOMY N/A 2022    Procedure: EXCISION, CECUM;  Surgeon: Rafa Cardozo MD;  Location: [facility];  Service: Colon and Rectal;  Laterality: N/A;     SECTION      CHOLECYSTECTOMY N/A 2022    Procedure: CHOLECYSTECTOMY;  Surgeon: Doug Epstein MD;  Location: [facility];  Service: General;  Laterality: N/A;    COLONOSCOPY N/A 2022    Procedure: COLONOSCOPY;  Surgeon: Rafa Cardozo MD;  Location: [facility];  Service: Colon and Rectal;  Laterality: N/A;    COLOSTOMY  2022    Procedure: CREATION, COLOSTOMY;  Surgeon: Rafa Cardozo MD;  Location: [facility];  Service: Colon and Rectal;;    CORONARY STENT PLACEMENT      LEFT HEART CATHETERIZATION Left 2022    Procedure: CATHETERIZATION, HEART, LEFT;  Surgeon: Yohannes Cordova MD;  Location: [facility] CATH LAB;  Service: Cardiology;  Laterality: Left;     Family History   Problem Relation Age of Onset    Heart disease Mother     Emphysema Father     Heart attack Brother      Social History     Tobacco Use    Smoking status: Former Smoker     Packs/day: 0.50     Start date:  1976     Quit date: 2012     Years since quittin.5    Smokeless tobacco: Never Used   Substance Use Topics    Alcohol use: No    Drug use: No     Review of Systems   Constitutional: Negative for activity change, appetite change, chills, diaphoresis and fever.   HENT: Negative for congestion, sore throat and trouble swallowing.    Eyes: Negative for photophobia and visual disturbance.   Respiratory: Positive for shortness of breath. Negative for cough and chest tightness.    Cardiovascular: Negative for chest pain and leg swelling.   Gastrointestinal: Positive for abdominal pain (around ostomy site), nausea and vomiting.   Endocrine: Negative for polydipsia and polyuria.   Genitourinary: Negative for difficulty urinating and flank pain.   Musculoskeletal: Negative for back pain and neck pain.   Skin: Negative for rash.   Neurological: Negative for weakness and headaches.   Psychiatric/Behavioral: Negative for confusion.       Physical Exam     Initial Vitals [22 0023]   BP Pulse Resp Temp SpO2   115/84 93 20 98.8 °F (37.1 °C) 96 %      MAP       --         Physical Exam    Nursing note and vitals reviewed.  Constitutional: She is cooperative.  Non-toxic appearance. No distress.   Chronically ill appearing.    HENT:   Head: Normocephalic and atraumatic.   Mouth/Throat: Oropharynx is clear and moist.   Eyes: Conjunctivae and EOM are normal. Pupils are equal, round, and reactive to light.   Neck: Neck supple. No thyromegaly present.   Normal range of motion.   Full passive range of motion without pain.     Cardiovascular: Normal rate, regular rhythm, normal heart sounds and normal pulses.   Pulmonary/Chest: Effort normal.   Speaking in short sentences, diminished air entry in the bilateral bases, ICD present to the left anterior chest.    Abdominal: Abdomen is soft. Bowel sounds are normal. She exhibits no distension. There is no abdominal tenderness.   Colostomy bag in place.    Musculoskeletal:          General: No edema. Normal range of motion.      Cervical back: Full passive range of motion without pain, normal range of motion and neck supple.      Comments: No lower extremity edema in BLE     Neurological: She is alert and oriented to person, place, and time. She has normal strength. No cranial nerve deficit or sensory deficit.   Skin: Skin is warm, dry and intact. No rash noted.   Psychiatric: She has a normal mood and affect. Her speech is normal and behavior is normal. Judgment and thought content normal.         ED Course   Critical Care    Date/Time: 6/8/2022 2:19 AM  Performed by: Analy Stern MD  Authorized by: Analy Stern MD   Total critical care time (exclusive of procedural time) : 60 minutes  Critical care was necessary to treat or prevent imminent or life-threatening deterioration of the following conditions: cardiac failure.  Critical care was time spent personally by me on the following activities: re-evaluation of patient's condition, review of old charts, ordering and review of radiographic studies, pulse oximetry, ordering and review of laboratory studies, obtaining history from patient or surrogate and examination of patient.        Labs Reviewed   CBC W/ AUTO DIFFERENTIAL - Abnormal; Notable for the following components:       Result Value    RBC 3.70 (*)     Hemoglobin 10.9 (*)     Hematocrit 33.1 (*)     RDW 20.1 (*)     All other components within normal limits   COMPREHENSIVE METABOLIC PANEL - Abnormal; Notable for the following components:    Sodium 132 (*)     Glucose 125 (*)     Albumin 2.1 (*)     Alkaline Phosphatase 138 (*)     All other components within normal limits   B-TYPE NATRIURETIC PEPTIDE - Abnormal; Notable for the following components:    BNP >4,900 (*)     All other components within normal limits   HEMOGLOBIN A1C   MAGNESIUM   PROCALCITONIN   SARS-COV-2 RDRP GENE     EKG Readings: (Independently Interpreted)   Initial Reading: No STEMI.   Sinus arrhythmia  with a rate of 91 bpm. RBBB present. No change in comparison to previous EKG on 5/23/22.        Imaging Results          X-Ray Chest 1 View (Final result)  Result time 06/08/22 01:10:16    Final result by Adrián Valdez MD (06/08/22 01:10:16)                 Impression:      Intrathoracic findings as above.      Electronically signed by: Adrián Valdez  Date:    06/08/2022  Time:    01:10             Narrative:    EXAMINATION:  XR CHEST 1 VIEW    CLINICAL HISTORY:  Cough, unspecified    TECHNIQUE:  Single frontal view of the chest was performed.    COMPARISON:  Chest radiograph May 23, 2022    FINDINGS:  Single chest view is submitted.  Cardiac pacemaker is again noted.  When accounting for difference in position and technique the appearance of the cardiomediastinal silhouette is stable.  There is mild diminished depth of inspiration and mild rotation.    Prominence of the pulmonary vasculature is noted in there is bilateral pattern of interstitial and patchy alveolar infiltrate.  There is no evidence for significant pleural effusion and no evidence for pneumothorax.  The osseous structures appear intact with chronic change noted.                              X-Rays:   Independently Interpreted Readings:   Chest X-Ray: Cardiomegaly present. Defibrillator in place. Moderate pulmonary edema noted.      Medications   sodium chloride 0.9% flush 10 mL (has no administration in time range)   melatonin tablet 6 mg (has no administration in time range)   acetaminophen tablet 650 mg (has no administration in time range)   sodium chloride 0.9% flush 10 mL (has no administration in time range)   furosemide injection 40 mg (has no administration in time range)   ALPRAZolam tablet 0.5 mg (has no administration in time range)   aspirin EC tablet 81 mg (has no administration in time range)   carvediloL tablet 3.125 mg (has no administration in time range)   clopidogreL tablet 75 mg (has no administration in time range)    ezetimibe tablet 10 mg (has no administration in time range)   fluticasone propionate 50 mcg/actuation nasal spray 50 mcg (has no administration in time range)   HYDROcodone-acetaminophen 5-325 mg per tablet 1 tablet (has no administration in time range)   pantoprazole EC tablet 40 mg (has no administration in time range)   spironolactone split tablet 12.5 mg (has no administration in time range)   furosemide injection 40 mg (40 mg Intravenous Given 6/8/22 0212)     Medical Decision Making:   History:   Old Medical Records: I decided to obtain old medical records.  Initial Assessment:   Urgent evaluation a 66-year-old female with history of hypertension, hyperlipidemia, CAD, CHF with EF of 20%, with AICD in place here with complaint of shortness of breath.  Patient had extensive abdominal surgery last month, with colostomy placed.  She has been experiencing shortness of breath since that surgery, discharge to rehab facility, then to home.  Patient reports minimal exercise tolerance.  Independently Interpreted Test(s):   I have ordered and independently interpreted X-rays - see prior notes.  I have ordered and independently interpreted EKG Reading(s) - see prior notes  Clinical Tests:   Lab Tests: Ordered and Reviewed  Radiological Study: Ordered and Reviewed  Medical Tests: Ordered and Reviewed  ED Management:  Pt had a V/Q on last admission and neg, BNP greatly elevated here though consistently >1000 no overt fluid overload on exam- no edema or elevated jvd. I have some concerns deconditioning may be contributing to her dyspnea and expressed concern about need for recurrent admissions- pt desiring admission for cardiac eval and concerns about caring for herself at home. Will admit for gentle diuresis, possible card eval, pt/ot placement eval.            Scribe Attestation:   Scribe #1: I performed the above scribed service and the documentation accurately describes the services I performed. I attest to the  accuracy of the note.        ED Course as of 06/08/22 0256 Wed Jun 08, 2022 0255 BNP(!): >4,900 [DM]      ED Course User Index  [DM] MD LUTHER Mitchell Muhlfelder, personally performed the services described in this documentation. All medical record entries made by the scribe were at my direction and in my presence. I have reviewed the chart and agree that the record reflects my personal performance and is accurate and complete.    Clinical Impression:   Final diagnoses:  [R05.9] Cough  [I50.9] Acute on chronic congestive heart failure, unspecified heart failure type (Primary)  [Z93.3] Colostomy in place          ED Disposition Condition    Admit               Analy Stern MD  06/08/22 0219       Analy Stern MD  06/08/22 0256

## 2022-06-08 NOTE — ED TRIAGE NOTES
Patient presents with multiple complaints after being discharged s/p colostomy placement. Patient states she doesn't want to go home to die. AAOx4.

## 2022-06-08 NOTE — ASSESSMENT & PLAN NOTE
- started on Macrobid on 6/6/22 during an ED visit   - Urine culture is without reported growth to date   - she had a prior VRE UTI   - continue Macrobid

## 2022-06-08 NOTE — HPI
"From H&P by Martha GANNON:  "Ms. Odette Hart is a 66 y.o. female, with PMH of HFrEF (EF 20%), CAD s/p 2x HEVER placement in 1/2022 (on Plavix), Ischemic Cardiomyopathy s/p ICD placement, RBBB, HTN, calculus cholecystitis s/p recent cholecystectomy, colovesicular fistula 2/2 complicated diverticulitis s/p cystoscopy with BL ureteral catheters, open sigmoid colon resection, appendectomy, and creation of end colostomy on 5/9/22, anemia, chronic hypoxemic respiratory failure (not on home O2), MDD, Debility, who presented to [facility] ED on 6/8/22 due to shortness of breath. She notes sudden onset a few days ago, with worsening tonight. She states this is worse with exertion. She also notes pain around her colostomy site over the past several days. She was seen in the ED due to this pain on 6/6/22 and was diagnosed with a UTI, and discharged on Macrobid. She states she had two episodes of emesis yesterday. She was evaluated in the ED with labs showing anemia with H&H of 10.9/33.1, ALP of 138, BNP of >4900. CXR showed prominent pulmonary vasculature, with interstitial and patchy alveolar infiltrates, and without pleural effusion. She was treated in the ED with 40 mg IV lasix. She was admitted to inpatient status."  "

## 2022-06-08 NOTE — PLAN OF CARE
[facility], [place], operated by [facility] - Med Surg ([facility])  Initial Discharge Assessment       Primary Care Provider: Braxton Barragan MD    Admission Diagnosis: Cough [R05.9]  Colostomy in place [Z93.3]  Acute on chronic congestive heart failure, unspecified heart failure type [I50.9]    Admission Date: 6/8/2022  Expected Discharge Date:     Discharge Barriers Identified: None    Payor: [employer] / Plan: [employer] 65 / Product Type: Medicare Advantage /     Extended Emergency Contact Information  Primary Emergency Contact: Telly Ortiz  Mobile Phone: [phone]  Relation: Son  Preferred language: English   needed? No  Secondary Emergency Contact: Scot Marroquin  Mobile Phone: [phone]  Relation: Other  Preferred language: English   needed? No              [facility] [place] [id number]  [place] - [address]  [address]  [facility] [address]  Phone: [phone] Fax: [phone]    [facility], [place], operated by [facility]  [address] [place] [address] [address]  [facility] [address]  Phone: [phone] Fax: [phone]      Initial Assessment (most recent)       Adult Discharge Assessment - 06/08/22 1647          Discharge Assessment    Assessment Type Discharge Planning Assessment     Confirmed/corrected address, phone number and insurance Yes     Confirmed Demographics Correct on Facesheet     Source of Information patient     Lives With alone     Do you expect to return to your current living situation? Yes   Patient stated she wants to go home and not to rehab.    Do you have help at home or someone to help you manage your care at home? Yes     Prior to hospitilization cognitive status: Alert/Oriented     Current cognitive status: Alert/Oriented     Walking or Climbing Stairs Difficulty ambulation difficulty, assistance 1 person     Dressing/Bathing Difficulty none     Home Layout Able to live on 1st floor     Equipment Currently Used at Home walker, rolling      Readmission within 30 days? Yes     Do you currently have service(s) that help you manage your care at home? Yes     Name and Contact number of agency --   Patient has home health that is supposed to start coming out to her this week.  She cannot remember name of agency.    Do you take prescription medications? Yes     Do you have prescription coverage? Yes     Do you have any problems affording any of your prescribed medications? No     Is the patient taking medications as prescribed? yes     Who is going to help you get home at discharge? Family/friend     How do you get to doctors appointments? car, drives self     Are you on dialysis? No     Do you take coumadin? No     DME Needed Upon Discharge  none     Discharge Plan discussed with: Patient     Discharge Barriers Identified None                      CM met with the patient at the bedside.     Patient is alert and oriented with no communication barriers.     Prior to admission patient is independent. Patient has a HH agency that is supposed to start coming to see her this week. Patient uses a RW. Patient stated that she wants to go home and not to a SNF because she feels that another facility made her sick.    Patients PCP is correct on the face sheet. Patient choice pharmacy is [facility] on [address].    Advance directives: Unknown      Patients family will transport the patient home at discharge.     No CM needs identified at this time.     CM team will continue to follow.

## 2022-06-08 NOTE — HOSPITAL COURSE
Patient small amount of drainage from the most superior and inferior aspect of her midline incision.  some stool contents spilled into her incision and caused a superficial dehiscence.  No sign of any deep abscess.

## 2022-06-08 NOTE — PROGRESS NOTES
[facility] ([facility])  Wound Care    Patient Name:  Odette Hart   MRN:  [id number]  Date: 2022  Diagnosis: Acute on chronic congestive heart failure    History:     Past Medical History:   Diagnosis Date    Acute coronary syndrome     Acute exacerbation of chronic obstructive pulmonary disease (COPD) 3/23/2022    Allergy     Arthritis     Cardiomyopathy     CHF (congestive heart failure)     Coronary artery disease     Coronary artery disease of native artery of native heart with stable angina pectoris 2021: [facility]: Cath: LAD: Proximal stent patent. LCX: Proximal 80%. LCX: Dominant. Moderate disease. LCX: HEVER 2.75 x 18 mm. Distal dissection. HEVER 2.75 x 22 mm.     Diverticulitis     Diverticulosis     Familial hypercholesterolemia 2022    Former smoker 2022    Heart attack     Heart disease     History of myocardial infarction 2022    Hyperlipidemia     Hypertension     Hypertension 2022    ICD (implantable cardioverter-defibrillator) in place     Non-ST elevation myocardial infarction (NSTEMI) 2019       Social History     Socioeconomic History    Marital status:    Tobacco Use    Smoking status: Former Smoker     Packs/day: 0.50     Start date: 1976     Quit date: 2012     Years since quittin.5    Smokeless tobacco: Never Used   Substance and Sexual Activity    Alcohol use: No    Drug use: No     Social Determinants of Health     Financial Resource Strain: Low Risk     Difficulty of Paying Living Expenses: Not very hard   Food Insecurity: No Food Insecurity    Worried About Running Out of Food in the Last Year: Never true    Ran Out of Food in the Last Year: Never true   Transportation Needs: No Transportation Needs    Lack of Transportation (Medical): No    Lack of Transportation (Non-Medical): No   Physical Activity: Insufficiently Active    Days of Exercise per Week: 3 days    Minutes of Exercise per Session: 10 min   Stress: Stress  Concern Present    Feeling of Stress : To some extent   Social Connections: Socially Isolated    Frequency of Communication with Friends and Family: Twice a week    Frequency of Social Gatherings with Friends and Family: Three times a week    Attends Gnosticist Services: Never    Active Member of Clubs or Organizations: No    Attends Club or Organization Meetings: Never    Marital Status:    Housing Stability: Low Risk     Unable to Pay for Housing in the Last Year: No    Number of Places Lived in the Last Year: 1    Unstable Housing in the Last Year: No       Precautions:     Allergies as of 06/08/2022 - Reviewed 06/08/2022   Allergen Reaction Noted    Augmentin [amoxicillin-pot clavulanate] Swelling 02/19/2018    Lisinopril Other (See Comments) 02/19/2018    Losartan Other (See Comments) 02/19/2018    Shellfish containing products Anaphylaxis 09/10/2018    Levaquin [levofloxacin] Other (See Comments) 09/18/2018    Clindamycin Palpitations 01/21/2019       WOC Assessment Details/Treatment     Wound care consulted for multiple wounds  Chronic full thickness sacral wound with loose slough in base. Was able to preform conservative sharp wound debridement to remove loose slough from wound base. Will order santyl ointment enzymatic debrider for daily wound care.  Left buttock with two punctate openings. Superior opening is 0.3x0.3x0.2cm with a pink base.  Distal opening is 0.2x0.2cm and slough filled.   Midline incision with 5 openings :      Umbilicus to distal   0.7x0.5x0.5 and tracks medially 1cm , applied aquacel AG to wick wound opening.  0.3x0.2x0.2cm with black suture exposed., applied Triad  1.8x0.9x0.3cm slough filled wound, applied Triad  0.4x0.3x0.2cm slough filled wound, applied Triad.  0.2x0.3x0.2cm slough filled wound , applied Triad  Colostomy appliance is intact and pt reports it was changed last night. Admits to multiple leaks at home and wants reinforcement with education.   Right lower quadrant  wound covered by dark brown scab, lifted to reveal slough filled wound base. Cleaned with vase and applied triad to wound bed and covered with mepore dressing.    Left forearm skin teat with mepilex border dressing in use.     06/08/22 1500   Genitourinary   Urine Characteristics clear;clear yellow;without foul odor        Incision/Site 05/09/22 1135 Abdomen   Date First Assessed/Time First Assessed: 05/09/22 1135   Present Prior to Hospital Arrival?: Yes  Location: Abdomen   Wound Image   (5 openings in incision line , 3 distal wounds are slough filled)   Incision WDL ex   Dressing Appearance Clean;Intact   Drainage Amount Scant   Drainage Characteristics/Odor Yellow;No odor   Appearance Yellow;Slough;Moist  (umbilicus wound tracts 1cm, suture visible in base,)   Periwound Area Redness   Wound Edges Open;Dehisced   Wound Length (cm) 0.7 cm  (see wound care notes)   Wound Width (cm) 0.5 cm   Wound Depth (cm) 0.5 cm   Wound Volume (cm^3) 0.175 cm^3   Wound Surface Area (cm^2) 0.35 cm^2   Tunneling (depth (cm)/location) 1cm medially   Care Cleansed with:;Wound cleanser  (Vashe soak)        Incision/Site 05/23/22 2200 Right Lower quadrant lower Laparoscopic Puncture   Date First Assessed/Time First Assessed: 05/23/22 2200   Present Prior to Hospital Arrival?: Yes  Side: Right  Location: Lower quadrant  Orientation: lower  Incision Type: Laparoscopic Puncture  Additional Comments: purulent drainage.   Incision WDL ex   Dressing Appearance Open to air;No dressing   Drainage Amount None   Drainage Characteristics/Odor No odor   Appearance Dry  (brown crusted scab)   Black (%), Wound Tissue Color 100 %  (brown)   Periwound Area Blomkest   Wound Edges Open   Wound Length (cm) 0.8 cm   Wound Width (cm) 1.5 cm   Wound Depth (cm) 0.2 cm   Wound Volume (cm^3) 0.24 cm^3   Wound Surface Area (cm^2) 1.2 cm^2   Care Cleansed with:;Wound cleanser   Dressing Applied;Island/border  (triad)        Wound 05/23/22 2200 Skin Tear Left  distal;lower;posterior Arm #1   Date First Assessed/Time First Assessed: 05/23/22 2200   Pre-existing: Yes  Primary Wound Type: Skin Tear  Side: Left  Orientation: distal;lower;posterior  Location: Arm  Wound Number: #1   Wound WDL ex   Dressing Appearance Dry;Intact   Drainage Amount None   Drainage Characteristics/Odor No odor   Appearance Red   Dressing Silicone;Foam        Altered Skin Integrity 05/09/22 1630 Sacral spine Full thickness tissue loss. Base is covered by slough and/or eschar in the wound bed   Date First Assessed/Time First Assessed: 05/09/22 1630   Altered Skin Integrity Present on Admission: yes  Location: Sacral spine  Description of Altered Skin Integrity: Full thickness tissue loss. Base is covered by slough and/or eschar in the wound bed   Wound Image    Appearance Moist;Tan;Slough;Pink;White   Tissue loss description Full thickness   Periwound Area Redness   Wound Edges Open   Wound Length (cm) 3.2 cm   Wound Width (cm) 1.5 cm   Wound Depth (cm) 0.5 cm   Wound Volume (cm^3) 2.4 cm^3   Wound Surface Area (cm^2) 4.8 cm^2   Care Cleansed with:;Wound cleanser  (Vashe soak, triad applied)   Dressing Applied;Silicone;Foam        Altered Skin Integrity 06/08/22 1558 Left medial Buttocks Abscess   Date First Assessed/Time First Assessed: 06/08/22 1558   Altered Skin Integrity Present on Admission: yes  Side: Left  Orientation: medial  Location: Buttocks  Is this injury device related?: No  Primary Wound Type: Abscess   Dressing Appearance Clean;Intact   Drainage Amount None   Drainage Characteristics/Odor No odor   Appearance Pink;Yellow;Moist   Tissue loss description Full thickness   Red (%), Wound Tissue Color   (superior opening pink, distal opening yellow and slough filled)   Periwound Area Redness   Wound Edges Open   Wound Length (cm) 0.3 cm   Wound Width (cm) 0.3 cm   Wound Depth (cm) 0.2 cm  (distal 0.2x0.2 slough filled)   Wound Volume (cm^3) 0.018 cm^3   Wound Surface Area (cm^2) 0.09  cm^2   Care Cleansed with:;Wound cleanser;Applied:;Skin Barrier   Dressing Silicone;Foam     06/08/2022

## 2022-06-08 NOTE — PT/OT/SLP EVAL
"Physical Therapy Evaluation    Patient Name:  Odette Hart   MRN:  [id number]    Recommendations:     Discharge Recommendations:  home health PT, home health OT (HH aide)   Discharge Equipment Recommendations: colostomy/ostomy supplies   Barriers to discharge: Inaccessible home and Decreased caregiver support    Assessment:     Odette Hart is a 66 y.o. female admitted with a medical diagnosis of Acute on chronic congestive heart failure. Previous hospitalization for colovesical fistula s/p appendectomy, colostomy, cecectomy, and cholecystectomy 05/09 with DC to SNF. Readmitted from SNF 5/23-5/31 d/t LI with DC to home with HH. She presents with the following impairments/functional limitations:  impaired endurance, weakness, impaired self care skills, impaired functional mobilty, gait instability, impaired balance, impaired skin, impaired cardiopulmonary response to activity.    Patient evaluated by PT and goals established. Patient with improved endurance and cardiopulm response to activity from previous admission; patient remarks that she "messed up" her medications and feels improved this PM and wishing to return home with HH when medically cleared. Currently performing stand step transfers with no AD and ambulation with rollator with SBA-supervision. PT will continue to follow and progress as tolerated. Rec for dc to home with HH (pt with difficulty establishing care after last DC, would greatly benefit from closer management of HH set up).     Rehab Prognosis: Good; patient would benefit from acute skilled PT services to address these deficits and reach maximum level of function.    Recent Surgery: * No surgery found *      Plan:     During this hospitalization, patient to be seen 3 x/week to address the identified rehab impairments via gait training, therapeutic activities, therapeutic exercises, neuromuscular re-education and progress toward the following goals:    · Plan of Care Expires:  " 06/22/22    Subjective     Chief Complaint: Feeling better after the night  Patient/Family Comments/goals: Goal to return home without a delay in getting HH; Patient agreeable to evaluation.  Pain/Comfort:  · Pain Rating 1: 0/10  · Pain Rating Post-Intervention 1: 0/10    Patients cultural, spiritual, Judaism conflicts given the current situation: no    Living Environment:  · Pt lives alone in a single story home/trailer with 4 steps to enter and B handrail(s).    · Upon discharge, patient will have assistance from her granddaughter PRN  Prior level of function:  · Ambulation: Mod(I) - uses rollator, has been progressing to (I) recently  · ADL's: Indep  · IADLs: Indep  ? Manages her home  ? Drives  ·  Equipment used at home: rollator.    Objective:     Communicated with PCT prior to session.  Patient found sitting on BSC with peripheral IV, colostomy, telemetry  upon PT entry to room.    General Precautions: Standard, fall   Orthopedic Precautions:N/A   Braces: N/A  Respiratory Status: Room air    Patient donned yellow socks and gait belt for OOB mobility.    Exams:  · Cognition:   ? Patient is oriented x4.  ? Pt follows approximately 100% of one and two step commands.    ? Mood: Pleasant and cooperative.   ? Safety Awareness: Good  · Musculoskeletal:  ? BMI: 19.82 (declined from 21.0 in 5/25)  ? Posture:  Forward head, rounded shoulders  ? LE ROM/Strength:   § R ROM: WFL  § L ROM: WFL  § R Strength:   § Knee extension: 4/5  § L Strength:   § Knee extension: 4/5   · Neuromuscular:  ? Sensation: Intact to light touch bilateral LEs. Denies paresthesias.   ? Coordination/Tone/Reflexes: No impairments identified with functional mobility. No formal testing performed.   ? Balance:   § Static sitting: Good  § Static standing: Good, maintains without UE support and without sway  ? Visual-vestibular: No impairments identified with functional mobility. No formal testing performed.  · Integument: Visible skin intact - abd  incisions not visualized  · Cardiopulmonary:  ? O2 Requirements: RA  ? Vital signs:   § Pre(sitting): HR 69 SpO2 96%  ? Edema: None noted BLE    Functional Mobility:  · Transfers:     · Sit to Stand:  supervision with no AD and 4 wheeled walker  · Toilet Transfer: supervision with  no AD  using  Step Transfer  · Gait: x90 ft with rollator and SBA-supervision.   · Cueing for use of brakes.  ·  No overt LOB or gross gait instability noted.   · Pt without c/o LI.     Therapeutic Activities and Exercises:   PT educated patient re:   PT plan of care/role of PT  Safety with OOB mobility  Use of rollator  Discharge disposition    Pt verbalized understanding       AM-PAC 6 CLICK MOBILITY  Total Score:19     Patient left sitting edge of bed with all lines intact, call button in reach and RN Lety notified.    GOALS:   Multidisciplinary Problems     Physical Therapy Goals        Problem: Physical Therapy    Goal Priority Disciplines Outcome Goal Variances Interventions   Physical Therapy Goal     PT, PT/OT Ongoing, Progressing     Description: Goals to be met by: 22    Patient will increase functional independence with mobility by performin. TUG in less than 12 sec to indicate not at increased risk for falls.   2. Gait x 150 feet with supervision with rollator or no AD.  3. Ascend/descend 4 step(s) with no AD and BHR.                    History:     Past Medical History:   Diagnosis Date    Acute coronary syndrome     Acute exacerbation of chronic obstructive pulmonary disease (COPD) 3/23/2022    Allergy     Arthritis     Cardiomyopathy     CHF (congestive heart failure)     Coronary artery disease     Coronary artery disease of native artery of native heart with stable angina pectoris 2021: [facility]: Cath: LAD: Proximal stent patent. LCX: Proximal 80%. LCX: Dominant. Moderate disease. LCX: HEVER 2.75 x 18 mm. Distal dissection. HEVER 2.75 x 22 mm.     Diverticulitis     Diverticulosis      Familial hypercholesterolemia 2022    Former smoker 2022    Heart attack     Heart disease     History of myocardial infarction 2022    Hyperlipidemia     Hypertension     Hypertension 2022    ICD (implantable cardioverter-defibrillator) in place     Non-ST elevation myocardial infarction (NSTEMI) 2019       Past Surgical History:   Procedure Laterality Date    APPENDECTOMY N/A 2022    Procedure: APPENDECTOMY;  Surgeon: Rafa Cardozo MD;  Location: [facility];  Service: Colon and Rectal;  Laterality: N/A;    ATRIAL CARDIAC PACEMAKER INSERTION      CECECTOMY N/A 2022    Procedure: EXCISION, CECUM;  Surgeon: Rafa Cardozo MD;  [facility]: [facility];  Service: Colon and Rectal;  Laterality: N/A;     SECTION      CHOLECYSTECTOMY N/A 2022    Procedure: CHOLECYSTECTOMY;  Surgeon: Doug Epstein MD;  [facility]: [facility];  Service: General;  Laterality: N/A;    COLONOSCOPY N/A 2022    Procedure: COLONOSCOPY;  Surgeon: Rafa Cardozo MD;  [facility]: [facility];  Service: Colon and Rectal;  Laterality: N/A;    COLOSTOMY  2022    Procedure: CREATION, COLOSTOMY;  Surgeon: Rafa Cardozo MD;  [facility]: [facility];  Service: Colon and Rectal;;    CORONARY STENT PLACEMENT      LEFT HEART CATHETERIZATION Left 2022    Procedure: CATHETERIZATION, HEART, LEFT;  Surgeon: Yohannes Cordova MD;  Location: [facility] CATH LAB;  Service: Cardiology;  Laterality: Left;       Time Tracking:     PT Received On: 22  PT Start Time: 1201     PT Stop Time: 1216  PT Total Time (min): 15 min     Billable Minutes: Evaluation 15      2022

## 2022-06-08 NOTE — ASSESSMENT & PLAN NOTE
Patient with a superficial wound dehiscence and her lower midline incision.  Recommend local wound care for now.  No sign of a deep abscess or obvious infection.  Plan discussed with hospitalist.

## 2022-06-08 NOTE — PLAN OF CARE
"  Problem: Physical Therapy  Goal: Physical Therapy Goal  Description: Goals to be met by: 22    Patient will increase functional independence with mobility by performin. TUG in less than 12 sec to indicate not at increased risk for falls.   2. Gait x 150 feet with supervision with rollator or no AD.  3. Ascend/descend 4 step(s) with no AD and BHR.   Outcome: Ongoing, Progressing     Patient evaluated by PT and goals established. Patient with improved endurance and cardiopulm response to activity from previous admission; patient remarks that she "messed up" her medications and feels improved this PM and wishing to return home with HH when medically cleared. Currently performing stand step transfers with no AD and ambulation with rollator with SBA-supervision. PT will continue to follow and progress as tolerated. Rec for dc to home with HH (pt with difficulty establishing care after last DC, would greatly benefit from closer management of HH set up). Please see progress note for detailed plan of care and recommendations.  "

## 2022-06-08 NOTE — SUBJECTIVE & OBJECTIVE
Past Medical History:   Diagnosis Date    Acute coronary syndrome     Acute exacerbation of chronic obstructive pulmonary disease (COPD) 3/23/2022    Allergy     Arthritis     Cardiomyopathy     CHF (congestive heart failure)     Coronary artery disease     Coronary artery disease of native artery of native heart with stable angina pectoris 2021: [facility]: Cath: LAD: Proximal stent patent. LCX: Proximal 80%. LCX: Dominant. Moderate disease. LCX: HEVER 2.75 x 18 mm. Distal dissection. HEVER 2.75 x 22 mm.     Diverticulitis     Diverticulosis     Familial hypercholesterolemia 2022    Former smoker 2022    Heart attack     Heart disease     History of myocardial infarction 2022    Hyperlipidemia     Hypertension     Hypertension 2022    ICD (implantable cardioverter-defibrillator) in place     Non-ST elevation myocardial infarction (NSTEMI) 2019       Past Surgical History:   Procedure Laterality Date    APPENDECTOMY N/A 2022    Procedure: APPENDECTOMY;  Surgeon: Rafa Cardozo MD;  Location: [facility];  Service: Colon and Rectal;  Laterality: N/A;    ATRIAL CARDIAC PACEMAKER INSERTION      CECECTOMY N/A 2022    Procedure: EXCISION, CECUM;  Surgeon: Rafa Cardozo MD;  Location: [facility];  Service: Colon and Rectal;  Laterality: N/A;     SECTION      CHOLECYSTECTOMY N/A 2022    Procedure: CHOLECYSTECTOMY;  Surgeon: Doug Epstein MD;  Location: [facility];  Service: General;  Laterality: N/A;    COLONOSCOPY N/A 2022    Procedure: COLONOSCOPY;  Surgeon: Rafa Cardozo MD;  Location: [facility];  Service: Colon and Rectal;  Laterality: N/A;    COLOSTOMY  2022    Procedure: CREATION, COLOSTOMY;  Surgeon: Rafa Cardozo MD;  Location: [facility];  Service: Colon and Rectal;;    CORONARY STENT PLACEMENT      LEFT HEART CATHETERIZATION Left 2022    Procedure: CATHETERIZATION, HEART, LEFT;  Surgeon: Yohannes  MD Tasha;  Location: [facility] CATH LAB;  Service: Cardiology;  Laterality: Left;       Review of patient's allergies indicates:   Allergen Reactions    Augmentin [amoxicillin-pot clavulanate] Swelling     Not allergic to amoxicillin just a derivative of augmentin    Lisinopril Other (See Comments)     Fluid around heart    Losartan Other (See Comments)     High potassium    Shellfish containing products Anaphylaxis     Pt.states she is allergic to SEAFOOD since the age of 13    Levaquin [levofloxacin] Other (See Comments)     Very bad joint pain    Clindamycin Palpitations     Chest pain       No current facility-administered medications on file prior to encounter.     Current Outpatient Medications on File Prior to Encounter   Medication Sig    acetaminophen (TYLENOL) 500 MG tablet Take 2 tablets (1,000 mg total) by mouth every 8 (eight) hours as needed for Pain.    albuterol (PROVENTIL/VENTOLIN HFA) 90 mcg/actuation inhaler Inhale 2 puffs into the lungs every 6 (six) hours as needed for Wheezing. Rescue    ALPRAZolam (XANAX) 0.5 MG tablet Take 1 tablet (0.5 mg total) by mouth nightly as needed for Anxiety.    aspirin (ECOTRIN) 81 MG EC tablet Take 1 tablet (81 mg total) by mouth once daily.    carvediloL (COREG) 3.125 MG tablet Take 1 tablet (3.125 mg total) by mouth 2 (two) times daily with meals.    clopidogreL (PLAVIX) 75 mg tablet Take 1 tablet (75 mg total) by mouth once daily.    ezetimibe (ZETIA) 10 mg tablet Take 1 tablet (10 mg total) by mouth every evening.    fluticasone propionate (FLONASE) 50 mcg/actuation nasal spray 1 spray (50 mcg total) by Each Nostril route once daily.    furosemide (LASIX) 20 MG tablet Take 1 tablet (20 mg total) by mouth once daily. (Patient not taking: Reported on 6/1/2022)    HYDROcodone-acetaminophen (NORCO) 5-325 mg per tablet Take 1 tablet by mouth every 12 (twelve) hours as needed for Pain.    Lactobacillus rhamnosus GG (CULTURELLE) 10 billion cell capsule Take 1 capsule  by mouth once daily.    nitrofurantoin, macrocrystal-monohydrate, (MACROBID) 100 MG capsule Take 1 capsule (100 mg total) by mouth 2 (two) times daily. for 5 days    nitroGLYCERIN (NITROSTAT) 0.4 MG SL tablet Place 1 tablet (0.4 mg total) under the tongue every 5 (five) minutes as needed for Chest pain.    pantoprazole (PROTONIX) 40 MG tablet Take 1 tablet (40 mg total) by mouth once daily.    polyethylene glycol (GLYCOLAX) 17 gram PwPk Take 17 g by mouth once daily.    simethicone (MYLICON) 80 MG chewable tablet Take 80 mg by mouth every 8 (eight) hours as needed.    spironolactone (ALDACTONE) 25 MG tablet Take 0.5 tablets (12.5 mg total) by mouth once daily.    [DISCONTINUED] metoprolol succinate (TOPROL-XL) 25 MG 24 hr tablet Take 1 tablet (25 mg total) by mouth once daily.    [DISCONTINUED] prasugreL (EFFIENT) 10 mg Tab Take 1 tablet (10 mg total) by mouth once daily.     Family History       Problem Relation (Age of Onset)    Emphysema Father    Heart attack Brother    Heart disease Mother          Tobacco Use    Smoking status: Former Smoker     Packs/day: 0.50     Start date: 1976     Quit date: 2012     Years since quittin.5    Smokeless tobacco: Never Used   Substance and Sexual Activity    Alcohol use: No    Drug use: No    Sexual activity: Not on file     Review of Systems   Constitutional:  Negative for chills, diaphoresis, fatigue and fever.   Respiratory:  Positive for shortness of breath. Negative for cough, chest tightness and wheezing.    Cardiovascular:  Negative for chest pain, palpitations and leg swelling.   Gastrointestinal:  Positive for vomiting. Negative for abdominal distention, abdominal pain, constipation, diarrhea and nausea.   Genitourinary:  Positive for decreased urine volume. Negative for dysuria, flank pain, frequency, hematuria and urgency.   Musculoskeletal:  Negative for arthralgias, back pain, gait problem, neck pain and neck stiffness.   Skin:  Positive for wound.  Negative for color change and rash.   Neurological:  Negative for dizziness, syncope, weakness, light-headedness, numbness and headaches.   Psychiatric/Behavioral:  Negative for agitation and confusion.    Objective:     Vital Signs (Most Recent):  Temp: 97.8 °F (36.6 °C) (06/08/22 0347)  Pulse: 77 (06/08/22 0420)  Resp: (!) 36 (06/08/22 0304)  BP: (!) 91/53 (06/08/22 0347)  SpO2: (!) 92 % (06/08/22 0347)   Vital Signs (24h Range):  Temp:  [97.8 °F (36.6 °C)-98.8 °F (37.1 °C)] 97.8 °F (36.6 °C)  Pulse:  [76-93] 77  Resp:  [20-36] 36  SpO2:  [89 %-97 %] 92 %  BP: ()/(52-84) 91/53     Weight: 52.7 kg (116 lb 4.7 oz)  Body mass index is 20.6 kg/m².    Physical Exam  Vitals and nursing note reviewed.   Constitutional:       General: She is not in acute distress.     Appearance: She is well-developed and normal weight. She is not ill-appearing, toxic-appearing or diaphoretic.      Comments: Elderly and chronically ill appearing.   HENT:      Head: Normocephalic and atraumatic.   Eyes:      General: No scleral icterus.        Right eye: No discharge.         Left eye: No discharge.      Conjunctiva/sclera: Conjunctivae normal.   Neck:      Trachea: No tracheal deviation.   Cardiovascular:      Rate and Rhythm: Normal rate and regular rhythm.      Pulses: Normal pulses.      Heart sounds: Normal heart sounds. No murmur heard.    No gallop.   Pulmonary:      Effort: Pulmonary effort is normal. No respiratory distress.      Breath sounds: Normal breath sounds. No stridor. No wheezing or rales.   Abdominal:      General: Bowel sounds are normal. There is no distension.      Palpations: Abdomen is soft. There is no mass.      Tenderness: There is no abdominal tenderness. There is no guarding.   Musculoskeletal:         General: No deformity. Normal range of motion.      Cervical back: Normal range of motion and neck supple.   Skin:     General: Skin is warm and dry.      Coloration: Skin is not pale.      Findings: No  erythema or rash.      Comments: Vertical midline surgical scar, with dehiscence in the lower 1/3 with drainage of slightly purulent material.    Neurological:      General: No focal deficit present.      Mental Status: She is alert and oriented to person, place, and time.      Cranial Nerves: No cranial nerve deficit.      Motor: No abnormal muscle tone.   Psychiatric:         Mood and Affect: Mood normal.         Behavior: Behavior normal.         Thought Content: Thought content normal.         Judgment: Judgment normal.           Significant Labs: All pertinent labs within the past 24 hours have been reviewed.  BMP:   Recent Labs   Lab 06/08/22 0107   *   *   K 3.8   CL 96   CO2 24   BUN 18   CREATININE 0.8   CALCIUM 8.9   MG 1.5*     CBC:   Recent Labs   Lab 06/06/22 1729 06/08/22 0107   WBC 3.50* 4.50   HGB 11.3* 10.9*   HCT 37.2 33.1*    212     CMP:   Recent Labs   Lab 06/06/22 1729 06/08/22 0107    132*   K 3.6 3.8   CL 98 96   CO2 27 24   * 125*   BUN 20 18   CREATININE 0.7 0.8   CALCIUM 8.6* 8.9   PROT 7.1 6.7   ALBUMIN 2.3* 2.1*   BILITOT 0.8 0.8   ALKPHOS 104 138*   AST 15 27   ALT 11 15   ANIONGAP 12 12   EGFRNONAA >60.0 >60     Urine Culture: No results for input(s): LABURIN in the last 48 hours.  Urine Studies:   Recent Labs   Lab 06/06/22 1959   COLORU Yellow   APPEARANCEUA Clear   PHUR 6.0   SPECGRAV 1.020   PROTEINUA Trace*   GLUCUA Negative   KETONESU Trace*   BILIRUBINUA Negative   OCCULTUA Negative   NITRITE Negative   UROBILINOGEN 1.0   LEUKOCYTESUR 1+*   RBCUA 0   WBCUA 14*   BACTERIA Rare       Significant Imaging: I have reviewed all pertinent imaging results/findings within the past 24 hours.  Imaging Results              X-Ray Chest 1 View (Final result)  Result time 06/08/22 01:10:16      Final result by Adrián Valdez MD (06/08/22 01:10:16)                   Impression:      Intrathoracic findings as above.      Electronically signed  by: Adrián Valdez  Date:    06/08/2022  Time:    01:10               Narrative:    EXAMINATION:  XR CHEST 1 VIEW    CLINICAL HISTORY:  Cough, unspecified    TECHNIQUE:  Single frontal view of the chest was performed.    COMPARISON:  Chest radiograph May 23, 2022    FINDINGS:  Single chest view is submitted.  Cardiac pacemaker is again noted.  When accounting for difference in position and technique the appearance of the cardiomediastinal silhouette is stable.  There is mild diminished depth of inspiration and mild rotation.    Prominence of the pulmonary vasculature is noted in there is bilateral pattern of interstitial and patchy alveolar infiltrate.  There is no evidence for significant pleural effusion and no evidence for pneumothorax.  The osseous structures appear intact with chronic change noted.

## 2022-06-08 NOTE — ASSESSMENT & PLAN NOTE
- continue Plavix   - 1/24/2022: [facility]: Cath: LAD: Proximal stent patent. LCX: Proximal 80%. LCX: Dominant. Moderate disease. LCX: HEVER 2.75 x 18 mm. Distal dissection. HEVER 2.75 x 22 mm.

## 2022-06-08 NOTE — ASSESSMENT & PLAN NOTE
- Ms. Odette Hart presents with shortness of breath  - BNP is elevated at >4900   - CXR showed prominent pulmonary vasculature, with interstitial and patchy alveolar infiltrates, and without pleural effusion  - s/p 40 mg IV lasix in ED   - last Echo was 5/24/2022  Summary  · The left ventricle is severely enlarged with eccentric hypertrophy and severely decreased systolic function.  · Severe left atrial enlargement.  · Grade III left ventricular diastolic dysfunction.  · Normal right ventricular size with normal right ventricular systolic function.  · Mild-to-moderate mitral regurgitation.  · Mild tricuspid regurgitation.  · There is mild pulmonary hypertension.  - CHF pathways initiated   - monitor during diuresis

## 2022-06-08 NOTE — H&P
Formerly [facility] Medicine  History & Physical    Patient Name: Odette Hart  MRN: [id number]  Patient Class: IP- Inpatient  Admission Date: 6/8/2022  Attending Physician: Sanjuanita Briones MD   Primary Care Provider: Braxton Barragan MD         Patient information was obtained from patient, past medical records and ER records.     Subjective:     Principal Problem:Acute on chronic congestive heart failure    Chief Complaint:   Chief Complaint   Patient presents with    multiple complaints     Pt seen yesterday at [facility] for pain to ostomy site and D/C, pt also has Hx of CHF and wants to be checked for fluid, complains of SOB        HPI: Ms. Odette Hart is a 66 y.o. female, with PMH of HFrEF (EF 20%), CAD s/p 2x HEVER placement in 1/2022 (on Plavix), Ischemic Cardiomyopathy s/p ICD placement, RBBB, HTN, calculus cholecystitis s/p recent cholecystectomy, colovesicular fistula 2/2 complicated diverticulitis s/p cystoscopy with BL ureteral catheters, open sigmoid colon resection, appendectomy, and creation of end colostomy on 5/9/22, anemia, chronic hypoxemic respiratory failure (not on home O2), MDD, Debility, who presented to [facility] ED on 6/8/22 due to shortness of breath. She notes sudden onset a few days ago, with worsening tonight. She states this is worse with exertion. She also notes pain around her colostomy site over the past several days. She was seen in the ED due to this pain on 6/6/22 and was diagnosed with a UTI, and discharged on Macrobid. She states she had two episodes of emesis yesterday. She was evaluated in the ED with labs showing anemia with H&H of 10.9/33.1, ALP of 138, BNP of >4900. CXR showed prominent pulmonary vasculature, with interstitial and patchy alveolar infiltrates, and without pleural effusion. She was treated in the ED with 40 mg IV lasix. She was admitted to inpatient status.       Past Medical History:   Diagnosis Date    Acute coronary syndrome     Acute  exacerbation of chronic obstructive pulmonary disease (COPD) 3/23/2022    Allergy     Arthritis     Cardiomyopathy     CHF (congestive heart failure)     Coronary artery disease     Coronary artery disease of native artery of native heart with stable angina pectoris 2021: [facility]: Cath: LAD: Proximal stent patent. LCX: Proximal 80%. LCX: Dominant. Moderate disease. LCX: HEVER 2.75 x 18 mm. Distal dissection. HEVER 2.75 x 22 mm.     Diverticulitis     Diverticulosis     Familial hypercholesterolemia 2022    Former smoker 2022    Heart attack     Heart disease     History of myocardial infarction 2022    Hyperlipidemia     Hypertension     Hypertension 2022    ICD (implantable cardioverter-defibrillator) in place     Non-ST elevation myocardial infarction (NSTEMI) 2019       Past Surgical History:   Procedure Laterality Date    APPENDECTOMY N/A 2022    Procedure: APPENDECTOMY;  Surgeon: Rafa Cardozo MD;  [facility];  Service: Colon and Rectal;  Laterality: N/A;    ATRIAL CARDIAC PACEMAKER INSERTION      CECECTOMY N/A 2022    Procedure: EXCISION, CECUM;  Surgeon: Rafa Cardozo MD;  Location: [facility] OR [facility] FLR;  Service: Colon and Rectal;  Laterality: N/A;     SECTION      CHOLECYSTECTOMY N/A 2022    Procedure: CHOLECYSTECTOMY;  Surgeon: Doug Epstein MD;  Location: [facility] OR 2ND FLR;  Service: General;  Laterality: N/A;    COLONOSCOPY N/A 2022    Procedure: COLONOSCOPY;  Surgeon: Rafa Cardozo MD;  Location: [facility] OR 2ND FLR;  Service: Colon and Rectal;  Laterality: N/A;    COLOSTOMY  2022    Procedure: CREATION, COLOSTOMY;  Surgeon: Rafa Cardozo MD;  Location: [facility] OR 2ND FLR;  Service: Colon and Rectal;;    CORONARY STENT PLACEMENT      LEFT HEART CATHETERIZATION Left 2022    Procedure: CATHETERIZATION, HEART, LEFT;  Surgeon: Yohannes Cordova MD;  Location: [facility] CATH LAB;  Service:  Cardiology;  Laterality: Left;       Review of patient's allergies indicates:   Allergen Reactions    Augmentin [amoxicillin-pot clavulanate] Swelling     Not allergic to amoxicillin just a derivative of augmentin    Lisinopril Other (See Comments)     Fluid around heart    Losartan Other (See Comments)     High potassium    Shellfish containing products Anaphylaxis     Pt.states she is allergic to SEAFOOD since the age of 13    Levaquin [levofloxacin] Other (See Comments)     Very bad joint pain    Clindamycin Palpitations     Chest pain       No current facility-administered medications on file prior to encounter.     Current Outpatient Medications on File Prior to Encounter   Medication Sig    acetaminophen (TYLENOL) 500 MG tablet Take 2 tablets (1,000 mg total) by mouth every 8 (eight) hours as needed for Pain.    albuterol (PROVENTIL/VENTOLIN HFA) 90 mcg/actuation inhaler Inhale 2 puffs into the lungs every 6 (six) hours as needed for Wheezing. Rescue    ALPRAZolam (XANAX) 0.5 MG tablet Take 1 tablet (0.5 mg total) by mouth nightly as needed for Anxiety.    aspirin (ECOTRIN) 81 MG EC tablet Take 1 tablet (81 mg total) by mouth once daily.    carvediloL (COREG) 3.125 MG tablet Take 1 tablet (3.125 mg total) by mouth 2 (two) times daily with meals.    clopidogreL (PLAVIX) 75 mg tablet Take 1 tablet (75 mg total) by mouth once daily.    ezetimibe (ZETIA) 10 mg tablet Take 1 tablet (10 mg total) by mouth every evening.    fluticasone propionate (FLONASE) 50 mcg/actuation nasal spray 1 spray (50 mcg total) by Each Nostril route once daily.    furosemide (LASIX) 20 MG tablet Take 1 tablet (20 mg total) by mouth once daily. (Patient not taking: Reported on 6/1/2022)    HYDROcodone-acetaminophen (NORCO) 5-325 mg per tablet Take 1 tablet by mouth every 12 (twelve) hours as needed for Pain.    Lactobacillus rhamnosus GG (CULTURELLE) 10 billion cell capsule Take 1 capsule by mouth once daily.     nitrofurantoin, macrocrystal-monohydrate, (MACROBID) 100 MG capsule Take 1 capsule (100 mg total) by mouth 2 (two) times daily. for 5 days    nitroGLYCERIN (NITROSTAT) 0.4 MG SL tablet Place 1 tablet (0.4 mg total) under the tongue every 5 (five) minutes as needed for Chest pain.    pantoprazole (PROTONIX) 40 MG tablet Take 1 tablet (40 mg total) by mouth once daily.    polyethylene glycol (GLYCOLAX) 17 gram PwPk Take 17 g by mouth once daily.    simethicone (MYLICON) 80 MG chewable tablet Take 80 mg by mouth every 8 (eight) hours as needed.    spironolactone (ALDACTONE) 25 MG tablet Take 0.5 tablets (12.5 mg total) by mouth once daily.    [DISCONTINUED] metoprolol succinate (TOPROL-XL) 25 MG 24 hr tablet Take 1 tablet (25 mg total) by mouth once daily.    [DISCONTINUED] prasugreL (EFFIENT) 10 mg Tab Take 1 tablet (10 mg total) by mouth once daily.     Family History       Problem Relation (Age of Onset)    Emphysema Father    Heart attack Brother    Heart disease Mother          Tobacco Use    Smoking status: Former Smoker     Packs/day: 0.50     Start date: 1976     Quit date: 2012     Years since quittin.5    Smokeless tobacco: Never Used   Substance and Sexual Activity    Alcohol use: No    Drug use: No    Sexual activity: Not on file     Review of Systems   Constitutional:  Negative for chills, diaphoresis, fatigue and fever.   Respiratory:  Positive for shortness of breath. Negative for cough, chest tightness and wheezing.    Cardiovascular:  Negative for chest pain, palpitations and leg swelling.   Gastrointestinal:  Positive for vomiting. Negative for abdominal distention, abdominal pain, constipation, diarrhea and nausea.   Genitourinary:  Positive for decreased urine volume. Negative for dysuria, flank pain, frequency, hematuria and urgency.   Musculoskeletal:  Negative for arthralgias, back pain, gait problem, neck pain and neck stiffness.   Skin:  Positive for wound. Negative for  color change and rash.   Neurological:  Negative for dizziness, syncope, weakness, light-headedness, numbness and headaches.   Psychiatric/Behavioral:  Negative for agitation and confusion.    Objective:     Vital Signs (Most Recent):  Temp: 97.8 °F (36.6 °C) (06/08/22 0347)  Pulse: 77 (06/08/22 0420)  Resp: (!) 36 (06/08/22 0304)  BP: (!) 91/53 (06/08/22 0347)  SpO2: (!) 92 % (06/08/22 0347)   Vital Signs (24h Range):  Temp:  [97.8 °F (36.6 °C)-98.8 °F (37.1 °C)] 97.8 °F (36.6 °C)  Pulse:  [76-93] 77  Resp:  [20-36] 36  SpO2:  [89 %-97 %] 92 %  BP: ()/(52-84) 91/53     Weight: 52.7 kg (116 lb 4.7 oz)  Body mass index is 20.6 kg/m².    Physical Exam  Vitals and nursing note reviewed.   Constitutional:       General: She is not in acute distress.     Appearance: She is well-developed and normal weight. She is not ill-appearing, toxic-appearing or diaphoretic.      Comments: Elderly and chronically ill appearing.   HENT:      Head: Normocephalic and atraumatic.   Eyes:      General: No scleral icterus.        Right eye: No discharge.         Left eye: No discharge.      Conjunctiva/sclera: Conjunctivae normal.   Neck:      Trachea: No tracheal deviation.   Cardiovascular:      Rate and Rhythm: Normal rate and regular rhythm.      Pulses: Normal pulses.      Heart sounds: Normal heart sounds. No murmur heard.    No gallop.   Pulmonary:      Effort: Pulmonary effort is normal. No respiratory distress.      Breath sounds: Normal breath sounds. No stridor. No wheezing or rales.   Abdominal:      General: Bowel sounds are normal. There is no distension.      Palpations: Abdomen is soft. There is no mass.      Tenderness: There is no abdominal tenderness. There is no guarding.   Musculoskeletal:         General: No deformity. Normal range of motion.      Cervical back: Normal range of motion and neck supple.   Skin:     General: Skin is warm and dry.      Coloration: Skin is not pale.      Findings: No erythema or  rash.      Comments: Vertical midline surgical scar, with dehiscence in the lower 1/3 with drainage of slightly purulent material.    Neurological:      General: No focal deficit present.      Mental Status: She is alert and oriented to person, place, and time.      Cranial Nerves: No cranial nerve deficit.      Motor: No abnormal muscle tone.   Psychiatric:         Mood and Affect: Mood normal.         Behavior: Behavior normal.         Thought Content: Thought content normal.         Judgment: Judgment normal.           Significant Labs: All pertinent labs within the past 24 hours have been reviewed.  BMP:   Recent Labs   Lab 06/08/22 0107   *   *   K 3.8   CL 96   CO2 24   BUN 18   CREATININE 0.8   CALCIUM 8.9   MG 1.5*     CBC:   Recent Labs   Lab 06/06/22 1729 06/08/22 0107   WBC 3.50* 4.50   HGB 11.3* 10.9*   HCT 37.2 33.1*    212     CMP:   Recent Labs   Lab 06/06/22 1729 06/08/22 0107    132*   K 3.6 3.8   CL 98 96   CO2 27 24   * 125*   BUN 20 18   CREATININE 0.7 0.8   CALCIUM 8.6* 8.9   PROT 7.1 6.7   ALBUMIN 2.3* 2.1*   BILITOT 0.8 0.8   ALKPHOS 104 138*   AST 15 27   ALT 11 15   ANIONGAP 12 12   EGFRNONAA >60.0 >60     Urine Culture: No results for input(s): LABURIN in the last 48 hours.  Urine Studies:   Recent Labs   Lab 06/06/22 1959   COLORU Yellow   APPEARANCEUA Clear   PHUR 6.0   SPECGRAV 1.020   PROTEINUA Trace*   GLUCUA Negative   KETONESU Trace*   BILIRUBINUA Negative   OCCULTUA Negative   NITRITE Negative   UROBILINOGEN 1.0   LEUKOCYTESUR 1+*   RBCUA 0   WBCUA 14*   BACTERIA Rare       Significant Imaging: I have reviewed all pertinent imaging results/findings within the past 24 hours.  Imaging Results              X-Ray Chest 1 View (Final result)  Result time 06/08/22 01:10:16      Final result by Adrián Valdez MD (06/08/22 01:10:16)                   Impression:      Intrathoracic findings as above.      Electronically signed by: Adrián  Courtney  Date:    06/08/2022  Time:    01:10               Narrative:    EXAMINATION:  XR CHEST 1 VIEW    CLINICAL HISTORY:  Cough, unspecified    TECHNIQUE:  Single frontal view of the chest was performed.    COMPARISON:  Chest radiograph May 23, 2022    FINDINGS:  Single chest view is submitted.  Cardiac pacemaker is again noted.  When accounting for difference in position and technique the appearance of the cardiomediastinal silhouette is stable.  There is mild diminished depth of inspiration and mild rotation.    Prominence of the pulmonary vasculature is noted in there is bilateral pattern of interstitial and patchy alveolar infiltrate.  There is no evidence for significant pleural effusion and no evidence for pneumothorax.  The osseous structures appear intact with chronic change noted.                                       Assessment/Plan:     * Acute on chronic congestive heart failure  - Ms. Odette Hart presents with shortness of breath  - BNP is elevated at >4900   - CXR showed prominent pulmonary vasculature, with interstitial and patchy alveolar infiltrates, and without pleural effusion  - s/p 40 mg IV lasix in ED   - last Echo was 5/24/2022  Summary  · The left ventricle is severely enlarged with eccentric hypertrophy and severely decreased systolic function.  · Severe left atrial enlargement.  · Grade III left ventricular diastolic dysfunction.  · Normal right ventricular size with normal right ventricular systolic function.  · Mild-to-moderate mitral regurgitation.  · Mild tricuspid regurgitation.  · There is mild pulmonary hypertension.  - CHF pathways initiated   - monitor during diuresis       Acute cystitis without hematuria  - started on Macrobid on 6/6/22 during an ED visit   - Urine culture is without reported growth to date   - she had a prior VRE UTI   - continue Macrobid       Ischemic cardiomyopathy  - history noted   - s/p ICD placement       CAD (coronary artery disease)  - continue  Plavix   - 1/24/2022: [facility]: Cath: LAD: Proximal stent patent. LCX: Proximal 80%. LCX: Dominant. Moderate disease. LCX: HEVER 2.75 x 18 mm. Distal dissection. HEVER 2.75 x 22 mm.       Primary hypertension  - mildly hypotensive with preserved MAP at time of admission   - home meds: carvedilol 3.125 mg BID, lasix 20 mg QD, spironolactone 25 mg QD  - monitor     Anemia  - H&H appear to be at approximate baseline   - monitor       Chronic hypoxemic respiratory failure  - not on home O2   - sats are 95% on RA   - monitor       Debility  - fall precautions   - Social Work consulted for NH placement as patient no longer desires to live independently       VTE Risk Mitigation (From admission, onward)         Ordered     IP VTE HIGH RISK PATIENT  Once         06/08/22 0253     Place sequential compression device  Until discontinued         06/08/22 0253     Place sequential compression device  Until discontinued         06/08/22 0252                   PRIMO LucasC  Department of Hospital Medicine   [facility] - Emergency Dept

## 2022-06-08 NOTE — PT/OT/SLP EVAL
Occupational Therapy   Evaluation    Name: Odette Hart  MRN: [id number]  Admitting Diagnosis:  Acute on chronic congestive heart failure  Recent Surgery: * No surgery found *      Recommendations:     Discharge Recommendations: home health OT  Discharge Equipment Recommendations:  colostomy/ostomy supplies  Barriers to discharge:  None    Assessment:     Odette Hart is a 66 y.o. female with a medical diagnosis of Acute on chronic congestive heart failure.  She presents with no pain.  Performance deficits affecting function: impaired endurance, impaired self care skills, impaired functional mobilty, gait instability, impaired cardiopulmonary response to activity, weakness.  Pt agreeable to participating in therapy upon arrival to room.  Pt demonstrates strength and ROM in (B) UE needed for ADLs that is WFL, and is able to perform sit <> stand transfer with supervision, rollator.    Overall, pt tolerated therapy well.  Pt recently at hospital at end of May 2022; discharged home.  Since being discharged pt states she has been requiring some assist with ADLs and was Mod I-I with mobility.  Pt would benefit from skilled OT services to address problems listed above and maximize independence with ADLs.  Home health is recommended upon d/c from acute care to further address deficits and help pt improve overall functional independence.       Rehab Prognosis: Good; patient would benefit from acute skilled OT services to address these deficits and reach maximum level of function.       Plan:     Patient to be seen 3 x/week to address the above listed problems via self-care/home management, therapeutic activities, therapeutic exercises  · Plan of Care Expires:  7/8/2022  · Plan of Care Reviewed with: patient    Subjective     Chief Complaint: SOB with activity   Patient/Family Comments/goals: Return home; resume Home health     Occupational Profile: Information taken from my OT eval dated 5/25; confirmed with pt  "information is same  Living Environment: Pt lives alone in mobile home, 4 SKYL[address], R HR.  Bathroom has tub/shower.  Previous level of function:   -ADLs: Independent for most part, but sometimes "needs a little help" when feeling fatigued  -Mobility: Independent- Mod I with rollator.  Pt states she uses equipment when she is tired, but sometimes walks on her own.    -Other:  States she got sick in February and grew weak; since then has still been able to manage at home, but more challenging  Roles and Routines: Mother, mother in law, grandmother, drives, enjoys walking (states she used to walk daily), about to receive Home health therapy before being admitted to hospital again  Equipment Used at Home:  none  Assistance upon Discharge: Granddaughter (just graduated high school) able to stay with pt    Pain/Comfort:  · Pain Rating 1: 0/10  · Pain Rating Post-Intervention 1: 0/10    Patients cultural, spiritual, Oriental orthodox conflicts given the current situation:  None stated    Objective:     Communicated with: RN prior to session.  Patient found sitting on BSC with peripheral IV, colostomy, telemetry upon OT entry to room.    General Precautions: Standard, fall   Orthopedic Precautions:N/A   Braces: N/A  Respiratory Status: Room air    Occupational Performance:    Bed Mobility:    · Not assessed 2* pt sitting on BSC upon arrival to room    Functional Mobility/Transfers:  · Sit <> Stand: Supervision, rollator x 1 trial from EOB  · Functional Mobility: Pt ambulated ~50 ft with supervision, rollator  · No instances of LOB noted    Activities of Daily Living:  · Grooming: Set up for cleaning hands with  while seated at EOB.    Cognitive/Visual Perceptual:  Cognitive/Psychosocial Skills:    -       Oriented to: Person, Place, Time and Situation   -       Follows Commands/attention: Follows multistep commands  -       Communication: clear/fluent  -       Memory: No Deficits noted  -       Safety awareness/insight to " disability: intact   -       Mood/Affect/Coping skills/emotional control: Cooperative and Pleasant    Physical Exam:  Postural examination/scapula alignment:    -       No postural abnormalities identified  Skin integrity: Visible skin intact  Edema:  None noted  Sensation: -       Intact  Motor Planning: -       WNL  Dominant hand: Right  Upper Extremity Range of Motion:    -       Right Upper Extremity: WFL  -       Left Upper Extremity: WFL  Upper Extremity Strength:   -       Right Upper Extremity: WFL; grossly 4/5 all muscle groups  -       Left Upper Extremity: WFL; grossly 4/5 all muscle gruops   Strength: WNL  Fine Motor Coordination: Intact  Gross motor coordination: WFL  Balance:  Sitting- Independent; Standing- Supervision  Vision: Intact    AMPAC 6 Click ADL:  AMPAC Total Score: 20    Treatment & Education:  *Pt educated on role of OT in acute care setting  Education:    Patient left sitting at EOB with all lines intact and call button in reach    GOALS:   Multidisciplinary Problems     Occupational Therapy Goals     Not on file                History:     Past Medical History:   Diagnosis Date    Acute coronary syndrome     Acute exacerbation of chronic obstructive pulmonary disease (COPD) 3/23/2022    Allergy     Arthritis     Cardiomyopathy     CHF (congestive heart failure)     Coronary artery disease     Coronary artery disease of native artery of native heart with stable angina pectoris 4/19/2021 1/24/2022: [facility]: Cath: LAD: Proximal stent patent. LCX: Proximal 80%. LCX: Dominant. Moderate disease. LCX: HEVER 2.75 x 18 mm. Distal dissection. HEVER 2.75 x 22 mm.     Diverticulitis     Diverticulosis     Familial hypercholesterolemia 1/22/2022    Former smoker 1/24/2022    Heart attack     Heart disease     History of myocardial infarction 1/24/2022    Hyperlipidemia     Hypertension     Hypertension 1/24/2022    ICD (implantable cardioverter-defibrillator) in place     Non-ST  elevation myocardial infarction (NSTEMI) 2019       Past Surgical History:   Procedure Laterality Date    APPENDECTOMY N/A 2022    Procedure: APPENDECTOMY;  Surgeon: Rafa Cardozo MD;  Location: [facility] OR 2ND FLR;  Service: Colon and Rectal;  Laterality: N/A;    ATRIAL CARDIAC PACEMAKER INSERTION      CECECTOMY N/A 2022    Procedure: EXCISION, CECUM;  Surgeon: Rafa Cardozo MD;  Location: [facility] OR [facility] FLR;  Service: Colon and Rectal;  Laterality: N/A;     SECTION      CHOLECYSTECTOMY N/A 2022    Procedure: CHOLECYSTECTOMY;  Surgeon: Doug Epstein MD;  Location: [facility] OR [facility] FLR;  Service: General;  Laterality: N/A;    COLONOSCOPY N/A 2022    Procedure: COLONOSCOPY;  Surgeon: Rafa Cardozo MD;  Location: [facility] OR [facility] FLR;  Service: Colon and Rectal;  Laterality: N/A;    COLOSTOMY  2022    Procedure: CREATION, COLOSTOMY;  Surgeon: Rafa Cardozo MD;  [facility];  Service: Colon and Rectal;;    CORONARY STENT PLACEMENT      LEFT HEART CATHETERIZATION Left 2022    Procedure: CATHETERIZATION, HEART, LEFT;  Surgeon: Yohannes Cordova MD;  Location: [facility] CATH LAB;  Service: Cardiology;  Laterality: Left;       Time Tracking:     OT Date of Treatment: 22  OT Start Time: 1201  OT Stop Time: 1220  OT Total Time (min): 19 min    Billable Minutes:Evaluation 19    2022

## 2022-06-08 NOTE — CONSULTS
Food & Nutrition  Education    Diet Education: CHF diet edu   Time Spent:  Learners:      Nutrition Education provided with handouts: Heart Failure Nutrition Therapy; Shake the Salt Habit; Fluid Restriction Nutrition Therapy       Comments:RD consulted for FR + low Na diet edu. Pt was hospitalized recently and was educated of FR + Low Na diet and colostomy diet. Explains she was following a low Na diet but not a FR. Explains how she is concerned with dehydration with taking a fluid pill with the restriction. No questions/ comments/ concerns at this time. With 2 sacral wounds- Paul Score 17. Was drinking Ezra when she was d/c. Will continue to monitor.     · Recommendations: Recommend Ezra BID to promote wound healing.     All questions and concerns answered. Dietitian's contact information provided.       Follow-Up: 6/15/2022    Please Re-consult as needed        Thanks!

## 2022-06-08 NOTE — PROGRESS NOTES
[facility] Med Surg [facility])  Wound Care    Patient Name:  Odette Hart   MRN:  [id number]  Date: 2022  Diagnosis: Acute on chronic congestive heart failure    History:     Past Medical History:   Diagnosis Date    Acute coronary syndrome     Acute exacerbation of chronic obstructive pulmonary disease (COPD) 3/23/2022    Allergy     Arthritis     Cardiomyopathy     CHF (congestive heart failure)     Coronary artery disease     Coronary artery disease of native artery of native heart with stable angina pectoris 2021: [facility]: Cath: LAD: Proximal stent patent. LCX: Proximal 80%. LCX: Dominant. Moderate disease. LCX: HEVER 2.75 x 18 mm. Distal dissection. HEVER 2.75 x 22 mm.     Diverticulitis     Diverticulosis     Familial hypercholesterolemia 2022    Former smoker 2022    Heart attack     Heart disease     History of myocardial infarction 2022    Hyperlipidemia     Hypertension     Hypertension 2022    ICD (implantable cardioverter-defibrillator) in place     Non-ST elevation myocardial infarction (NSTEMI) 2019       Social History     Socioeconomic History    Marital status:    Tobacco Use    Smoking status: Former Smoker     Packs/day: 0.50     Start date: 1976     Quit date: 2012     Years since quittin.5    Smokeless tobacco: Never Used   Substance and Sexual Activity    Alcohol use: No    Drug use: No     Social Determinants of Health     Financial Resource Strain: Low Risk     Difficulty of Paying Living Expenses: Not very hard   Food Insecurity: No Food Insecurity    Worried About Running Out of Food in the Last Year: Never true    Ran Out of Food in the Last Year: Never true   Transportation Needs: No Transportation Needs    Lack of Transportation (Medical): No    Lack of Transportation (Non-Medical): No   Physical Activity: Insufficiently Active    Days of Exercise per Week: 3 days    Minutes of Exercise per  Session: 10 min   Stress: Stress Concern Present    Feeling of Stress : To some extent   Social Connections: Socially Isolated    Frequency of Communication with Friends and Family: Twice a week    Frequency of Social Gatherings with Friends and Family: Three times a week    Attends Denominational Services: Never    Active Member of Clubs or Organizations: No    Attends Club or Organization Meetings: Never    Marital Status:    Housing Stability: Low Risk     Unable to Pay for Housing in the Last Year: No    Number of Places Lived in the Last Year: 1    Unstable Housing in the Last Year: No       Precautions:     Allergies as of 06/08/2022 - Reviewed 06/08/2022   Allergen Reaction Noted    Augmentin [amoxicillin-pot clavulanate] Swelling 02/19/2018    Lisinopril Other (See Comments) 02/19/2018    Losartan Other (See Comments) 02/19/2018    Shellfish containing products Anaphylaxis 09/10/2018    Levaquin [levofloxacin] Other (See Comments) 09/18/2018    Clindamycin Palpitations 01/21/2019       WOC Assessment Details/Treatment     Identified as at risk for pressure injury with low Paul score. Pressure injury prevention orders entered.     Paul Risk Assessment   Sensory Perception 3-->slightly limited   Moisture 4-->rarely moist   Activity 3-->walks occasionally   Mobility 3-->slightly limited   Nutrition 3-->adequate   Friction and Shear 1-->problem   Paul Score 17     06/08/2022

## 2022-06-08 NOTE — NURSING
Patient arrived to floor via wheelchair. Midline incision to abdomen weeping. Foul smelling. Colostomy leaking. 2 sacral wounds noted. Wound care performed and colostomy changed. CHG bath given. Commode at bedside. Patient resting comfortably at present. Bed locked in lowest position with side rails up x 2. Call light within reach of patient. Will continue purposeful rounding.

## 2022-06-08 NOTE — ASSESSMENT & PLAN NOTE
- fall precautions   - Social Work consulted for NH placement as patient no longer desires to live independently

## 2022-06-08 NOTE — ASSESSMENT & PLAN NOTE
- mildly hypotensive with preserved MAP at time of admission   - home meds: carvedilol 3.125 mg BID, lasix 20 mg QD, spironolactone 25 mg QD  - monitor

## 2022-06-08 NOTE — HPI
67 yo recently discharged to home with home health after chf exacerbation, comes with shortness of breath and nausea, vomiting, abdominal pain, some drainage from  midline abdominal incision.Patient states she just got home health to see her day before yesterday.She has been having trouble with leaking of the ostomy bag at home.Does c/o nausea and vomiting yesterday and abdominal pain with eating.Patient not consistently taking her lasix but admits to taking aldactone.Continue lasix bid for chf exacerbation.

## 2022-06-08 NOTE — SUBJECTIVE & OBJECTIVE
No current facility-administered medications on file prior to encounter.     Current Outpatient Medications on File Prior to Encounter   Medication Sig    acetaminophen (TYLENOL) 500 MG tablet Take 2 tablets (1,000 mg total) by mouth every 8 (eight) hours as needed for Pain.    albuterol (PROVENTIL/VENTOLIN HFA) 90 mcg/actuation inhaler Inhale 2 puffs into the lungs every 6 (six) hours as needed for Wheezing. Rescue    ALPRAZolam (XANAX) 0.5 MG tablet Take 1 tablet (0.5 mg total) by mouth nightly as needed for Anxiety.    aspirin (ECOTRIN) 81 MG EC tablet Take 1 tablet (81 mg total) by mouth once daily.    carvediloL (COREG) 3.125 MG tablet Take 1 tablet (3.125 mg total) by mouth 2 (two) times daily with meals.    clopidogreL (PLAVIX) 75 mg tablet Take 1 tablet (75 mg total) by mouth once daily.    ezetimibe (ZETIA) 10 mg tablet Take 1 tablet (10 mg total) by mouth every evening.    fluticasone propionate (FLONASE) 50 mcg/actuation nasal spray 1 spray (50 mcg total) by Each Nostril route once daily.    furosemide (LASIX) 20 MG tablet Take 1 tablet (20 mg total) by mouth once daily. (Patient not taking: Reported on 6/1/2022)    HYDROcodone-acetaminophen (NORCO) 5-325 mg per tablet Take 1 tablet by mouth every 12 (twelve) hours as needed for Pain.    Lactobacillus rhamnosus GG (CULTURELLE) 10 billion cell capsule Take 1 capsule by mouth once daily.    nitrofurantoin, macrocrystal-monohydrate, (MACROBID) 100 MG capsule Take 1 capsule (100 mg total) by mouth 2 (two) times daily. for 5 days    nitroGLYCERIN (NITROSTAT) 0.4 MG SL tablet Place 1 tablet (0.4 mg total) under the tongue every 5 (five) minutes as needed for Chest pain.    pantoprazole (PROTONIX) 40 MG tablet Take 1 tablet (40 mg total) by mouth once daily.    polyethylene glycol (GLYCOLAX) 17 gram PwPk Take 17 g by mouth once daily.    simethicone (MYLICON) 80 MG chewable tablet Take 80 mg by mouth every 8 (eight) hours as needed.    spironolactone (ALDACTONE)  25 MG tablet Take 0.5 tablets (12.5 mg total) by mouth once daily.    [DISCONTINUED] metoprolol succinate (TOPROL-XL) 25 MG 24 hr tablet Take 1 tablet (25 mg total) by mouth once daily.    [DISCONTINUED] prasugreL (EFFIENT) 10 mg Tab Take 1 tablet (10 mg total) by mouth once daily.       Review of patient's allergies indicates:   Allergen Reactions    Augmentin [amoxicillin-pot clavulanate] Swelling     Not allergic to amoxicillin just a derivative of augmentin    Lisinopril Other (See Comments)     Fluid around heart    Losartan Other (See Comments)     High potassium    Shellfish containing products Anaphylaxis     Pt.states she is allergic to SEAFOOD since the age of 13    Levaquin [levofloxacin] Other (See Comments)     Very bad joint pain    Clindamycin Palpitations     Chest pain       Past Medical History:   Diagnosis Date    Acute coronary syndrome     Acute exacerbation of chronic obstructive pulmonary disease (COPD) 3/23/2022    Allergy     Arthritis     Cardiomyopathy     CHF (congestive heart failure)     Coronary artery disease     Coronary artery disease of native artery of native heart with stable angina pectoris 4/19/2021 1/24/2022: [facility]: Cath: LAD: Proximal stent patent. LCX: Proximal 80%. LCX: Dominant. Moderate disease. LCX: HEVER 2.75 x 18 mm. Distal dissection. HEVER 2.75 x 22 mm.     Diverticulitis     Diverticulosis     Familial hypercholesterolemia 1/22/2022    Former smoker 1/24/2022    Heart attack     Heart disease     History of myocardial infarction 1/24/2022    Hyperlipidemia     Hypertension     Hypertension 1/24/2022    ICD (implantable cardioverter-defibrillator) in place     Non-ST elevation myocardial infarction (NSTEMI) 2/4/2019     Past Surgical History:   Procedure Laterality Date    APPENDECTOMY N/A 5/9/2022    Procedure: APPENDECTOMY;  Surgeon: Rafa Cardozo MD;  Location: [facility] OR [address] [place] [address];  Service: Colon and Rectal;  Laterality: N/A;    ATRIAL CARDIAC PACEMAKER  INSERTION      CECECTOMY N/A 2022    Procedure: EXCISION, CECUM;  Surgeon: Rafa Cardozo MD;  Location: [facility] OR 2ND FLR;  Service: Colon and Rectal;  Laterality: N/A;     SECTION      CHOLECYSTECTOMY N/A 2022    Procedure: CHOLECYSTECTOMY;  Surgeon: Doug Epstein MD;  Location: [facility] OR 2ND FLR;  Service: General;  Laterality: N/A;    COLONOSCOPY N/A 2022    Procedure: COLONOSCOPY;  Surgeon: Rafa Cardozo MD;  Location: [facility] OR 2ND FLR;  Service: Colon and Rectal;  Laterality: N/A;    COLOSTOMY  2022    Procedure: CREATION, COLOSTOMY;  Surgeon: Rafa Cardozo MD;  Location: [facility] OR 2ND FLR;  Service: Colon and Rectal;;    CORONARY STENT PLACEMENT      LEFT HEART CATHETERIZATION Left 2022    Procedure: CATHETERIZATION, HEART, LEFT;  Surgeon: Yohannes Cordova MD;  Location: [facility] [place] CATH LAB;  Service: Cardiology;  Laterality: Left;     Family History       Problem Relation (Age of Onset)    Emphysema Father    Heart attack Brother    Heart disease Mother          Tobacco Use    Smoking status: Former Smoker     Packs/day: 0.50     Start date: 1976     Quit date: 2012     Years since quittin.5    Smokeless tobacco: Never Used   Substance and Sexual Activity    Alcohol use: No    Drug use: No    Sexual activity: Not on file     Review of Systems   Constitutional:  Negative for appetite change, fatigue, fever and unexpected weight change.   HENT:  Negative for sore throat and trouble swallowing.    Eyes: Negative.    Respiratory:  Negative for cough, shortness of breath and wheezing.    Cardiovascular:  Negative for chest pain and leg swelling.   Gastrointestinal:  Negative for abdominal distention, abdominal pain, blood in stool, constipation, diarrhea, nausea and vomiting.   Endocrine: Negative.    Genitourinary: Negative.    Musculoskeletal:  Negative for back pain.   Skin: Negative.  Negative for rash.   Allergic/Immunologic: Negative.    Neurological:  Negative.    Hematological: Negative.    Psychiatric/Behavioral:  Negative for confusion.    Objective:     Vital Signs (Most Recent):  Temp: 97.6 °F (36.4 °C) (06/08/22 1124)  Pulse: 70 (06/08/22 1200)  Resp: 14 (06/08/22 1124)  BP: (!) 92/53 (06/08/22 1124)  SpO2: 96 % (06/08/22 1124)   Vital Signs (24h Range):  Temp:  [97.6 °F (36.4 °C)-98.8 °F (37.1 °C)] 97.6 °F (36.4 °C)  Pulse:  [70-93] 70  Resp:  [14-36] 14  SpO2:  [89 %-97 %] 96 %  BP: ()/(52-84) 92/53     Weight: 50.8 kg (111 lb 14.1 oz)  Body mass index is 19.82 kg/m².    Physical Exam  Vitals and nursing note reviewed.   Constitutional:       Appearance: She is well-developed.   HENT:      Head: Normocephalic and atraumatic.   Cardiovascular:      Rate and Rhythm: Normal rate.      Heart sounds: Normal heart sounds.   Pulmonary:      Effort: Pulmonary effort is normal.   Abdominal:      General: Bowel sounds are normal. There is no distension.      Palpations: Abdomen is soft.      Tenderness: There is no abdominal tenderness.   Musculoskeletal:         General: Normal range of motion.      Cervical back: Normal range of motion.   Skin:     General: Skin is warm and dry.      Capillary Refill: Capillary refill takes less than 2 seconds.             Comments: 1.5 cm drainage from the most superior aspect of the incision.  Inferior aspect of incision has a small opening as well with minimal amount of drainage and some fibrinous exudate.     Neurological:      Mental Status: She is alert and oriented to person, place, and time.   Psychiatric:         Behavior: Behavior normal.       Significant Labs:  I have reviewed all pertinent lab results within the past 24 hours.  CBC:   Recent Labs   Lab 06/08/22 0107   WBC 4.50   RBC 3.70*   HGB 10.9*   HCT 33.1*      MCV 90   MCH 29.5   MCHC 32.9     CMP:   Recent Labs   Lab 06/08/22 0107   *   CALCIUM 8.9   ALBUMIN 2.1*   PROT 6.7   *   K 3.8   CO2 24   CL 96   BUN 18   CREATININE 0.8    ALKPHOS 138*   ALT 15   AST 27   BILITOT 0.8

## 2022-06-09 PROBLEM — J96.11 CHRONIC HYPOXEMIC RESPIRATORY FAILURE: Status: RESOLVED | Noted: 2022-01-01 | Resolved: 2022-01-01

## 2022-06-09 NOTE — ASSESSMENT & PLAN NOTE
- continue Plavix , asa, zetia  - 1/24/2022: [facility]: Cath: LAD: Proximal stent patent. LCX: Proximal 80%. LCX: Dominant. Moderate disease. LCX: HEVER 2.75 x 18 mm. Distal dissection. HEVER 2.75 x 22 mm.

## 2022-06-09 NOTE — PROGRESS NOTES
[facility], [place] - Med Surg [facility])  Wound Care    Patient Name:  Odette Hart   MRN:  [id number]  Date: 2022  Diagnosis: Acute on chronic congestive heart failure    History:     Past Medical History:   Diagnosis Date    Acute coronary syndrome     Acute exacerbation of chronic obstructive pulmonary disease (COPD) 3/23/2022    Allergy     Arthritis     Cardiomyopathy     CHF (congestive heart failure)     Coronary artery disease     Coronary artery disease of native artery of native heart with stable angina pectoris 2021: [facility]: Cath: LAD: Proximal stent patent. LCX: Proximal 80%. LCX: Dominant. Moderate disease. LCX: HEVER 2.75 x 18 mm. Distal dissection. HEVER 2.75 x 22 mm.     Diverticulitis     Diverticulosis     Familial hypercholesterolemia 2022    Former smoker 2022    Heart attack     Heart disease     History of myocardial infarction 2022    Hyperlipidemia     Hypertension     Hypertension 2022    ICD (implantable cardioverter-defibrillator) in place     Non-ST elevation myocardial infarction (NSTEMI) 2019       Social History     Socioeconomic History    Marital status:    Tobacco Use    Smoking status: Former Smoker     Packs/day: 0.50     Start date: 1976     Quit date: 2012     Years since quittin.5    Smokeless tobacco: Never Used   Substance and Sexual Activity    Alcohol use: No    Drug use: No     Social Determinants of Health     Financial Resource Strain: Low Risk     Difficulty of Paying Living Expenses: Not hard at all   Food Insecurity: No Food Insecurity    Worried About Running Out of Food in the Last Year: Never true    Ran Out of Food in the Last Year: Never true   Transportation Needs: No Transportation Needs    Lack of Transportation (Medical): No    Lack of Transportation (Non-Medical): No   Physical Activity: Inactive    Days of Exercise per Week: 0 days    Minutes of Exercise per Session: 0 min   Stress: No Stress Concern  Present    Feeling of Stress : Not at all   Social Connections: Socially Isolated    Frequency of Communication with Friends and Family: More than three times a week    Frequency of Social Gatherings with Friends and Family: Once a week    Attends Yarsanism Services: Never    Active Member of Clubs or Organizations: No    Attends Club or Organization Meetings: Never    Marital Status:    Housing Stability: Low Risk     Unable to Pay for Housing in the Last Year: No    Number of Places Lived in the Last Year: 1    Unstable Housing in the Last Year: No       Precautions:     Allergies as of 06/08/2022 - Reviewed 06/08/2022   Allergen Reaction Noted    Augmentin [amoxicillin-pot clavulanate] Swelling 02/19/2018    Lisinopril Other (See Comments) 02/19/2018    Losartan Other (See Comments) 02/19/2018    Shellfish containing products Anaphylaxis 09/10/2018    Levaquin [levofloxacin] Other (See Comments) 09/18/2018    Clindamycin Palpitations 01/21/2019       [facility] Assessment Details/Treatment     Dressings changed to midline incision. Used silver hydrofiber to packs wound at umbilicus, triad applied to all slough filled wounds. Covered with island dressing.   Sacral wound assessed and still has slough in base. Buttock with two pinpoint openings and redness. Ostomy pouch intact .      06/09/22 1400        Incision/Site 05/09/22 1135 Abdomen   Date First Assessed/Time First Assessed: 05/09/22 1135   Present Prior to Hospital Arrival?: Yes  Location: Abdomen   Incision WDL ex   Dressing Appearance Moist drainage;Intact   Drainage Amount Small   Drainage Characteristics/Odor Tan;No odor   Appearance Yellow;Slough  (wound near umbilicus tracking and has suture exposed)   Periwound Area Redness   Wound Edges Open   Care Cleansed with:;Wound cleanser;Applied:;Skin Barrier  (Triad hydrophillic)   Dressing Applied;Hydrofiber;Silver;Island/border  (to umbilicus wound)        Incision/Site 05/23/22 2200 Right Lower quadrant  lower Laparoscopic Puncture   Date First Assessed/Time First Assessed: 05/23/22 2200   Present Prior to Hospital Arrival?: Yes  Side: Right  Location: Lower quadrant  Orientation: lower  Incision Type: Laparoscopic Puncture  Additional Comments: purulent drainage.   Incision WDL ex   Dressing Appearance Clean;Intact   Drainage Amount Scant   Drainage Characteristics/Odor No odor;Yellow   Appearance Pink;Yellow;Slough;Moist   Red (%), Wound Tissue Color 50 %   Yellow (%), Wound Tissue Color 50 %   Periwound Area Redness   Wound Edges Open   Care Cleansed with:;Wound cleanser;Applied:  (Triad)   Dressing Applied;Island/border        Altered Skin Integrity 05/09/22 1630 Sacral spine Full thickness tissue loss. Base is covered by slough and/or eschar in the wound bed   Date First Assessed/Time First Assessed: 05/09/22 1630   Altered Skin Integrity Present on Admission: yes  Location: Sacral spine  Description of Altered Skin Integrity: Full thickness tissue loss. Base is covered by slough and/or eschar in the wound bed   Description of Altered Skin Integrity Full thickness tissue loss. Base is covered by slough and/or eschar in the wound bed   Dressing Appearance Clean;Intact   Drainage Amount Scant   Drainage Characteristics/Odor Yellow;No odor   Appearance Pink;White;Slough;Moist   Tissue loss description Full thickness   Periwound Area Redness   Wound Edges Open   Care Cleansed with:;Wound cleanser;Applied:;Skin Barrier  (triad: santyl ordered but not available)   Dressing Applied;Silicone;Foam        Altered Skin Integrity 06/08/22 1558 Left medial Buttocks Abscess   Date First Assessed/Time First Assessed: 06/08/22 1558   Altered Skin Integrity Present on Admission: yes  Side: Left  Orientation: medial  Location: Buttocks  Is this injury device related?: No  Primary Wound Type: Abscess   Dressing Appearance Intact   Drainage Amount None   Drainage Characteristics/Odor No odor   Appearance Pink;Yellow  (two pinpoint  openings one pink, one slough filled, unable to express)   Periwound Area Swelling;Redness   Care Cleansed with:;Wound cleanser;Applied:;Skin Barrier  (triad)   Dressing Silicone;Foam     06/09/2022

## 2022-06-09 NOTE — ASSESSMENT & PLAN NOTE
- mildly hypotensive with preserved MAP at time of admission   - home meds: carvedilol 3.125 mg BID, lasix 20 mg QD, spironolactone 12.5 mg QD  - monitor

## 2022-06-09 NOTE — PROGRESS NOTES
IP Liaison - Initial Visit Note    Patient: Odette Hart  MRN:  [id number]  Date of Service:  6/9/2022  Completed by:  RADHA Madrigal    Reason for Visit   Patient presents with    IP Liaison Initial Visit       RSW met with patient at bedside in order to complete SDOH questionnaire and liaison assessment.  Pt has identified no barriers to care.  RSW is familiar with patient from previous inpatient stay last week. Patient has granddaughters that help her at home. RSW to follow up with patient.   The following were addressed during this visit:  - Review SDOH Questions   - Complete patient assessment   - Complete initial visit with patient        Patient Summary     IP Liaison Patient Assessment    General  Level of Caregiver support: Caregiver currently provides assistance  Have you had to make a decision between paying for any of the following in the last 2 months?: None  Transportation means: Family  Employment status: Disabled  Current symptoms: Sleep disturbances, Restlessness  Assessments  Was the PHQ Depression Screening completed this visit?: Yes  Was the DANIEL-7 Screening completed this visit?: No         RADHA Madrigal

## 2022-06-09 NOTE — PT/OT/SLP PROGRESS
Physical Therapy Treatment    Patient Name:  Odette Hart   MRN:  [id number]    Recommendations:     Discharge Recommendations:  home health OT, home health PT   Discharge Equipment Recommendations: colostomy/ostomy supplies   Barriers to discharge: None    Assessment:     Odette Hart is a 66 y.o. female admitted with a medical diagnosis of Acute on chronic congestive heart failure.  She presents with the following impairments/functional limitations:  weakness, impaired endurance, impaired functional mobilty, gait instability, decreased lower extremity function, impaired cardiopulmonary response to activity ;pt with good mobility today, inc amb distance into hallway, NO LOB or SOB noted, O2:99% on RA, HR:70's following amb..    Rehab Prognosis: Good; patient would benefit from acute skilled PT services to address these deficits and reach maximum level of function.    Recent Surgery: * No surgery found *      Plan:     During this hospitalization, patient to be seen 3 x/week to address the identified rehab impairments via gait training, therapeutic activities, therapeutic exercises, neuromuscular re-education and progress toward the following goals:    · Plan of Care Expires:  06/22/22    Subjective     Chief Complaint: gas pains  Patient/Family Comments/goals: pt agreeable to session  Pain/Comfort:  · Pain Rating 1:  (pt c/o gas pains in stomach, did not rate)      Objective:     Communicated with nurse prior to session.  Patient found sitting up on sofa upon arrival with peripheral IV, colostomy, telemetry upon PT entry to room.     General Precautions: Standard, fall   Orthopedic Precautions:N/A   Braces: N/A  Respiratory Status: Room air     Functional Mobility:  · Transfers:     · Sit to Stand:  supervision with 4 wheeled walker  · Gait: amb'd ~300' w/ rollator and SBA, 3 brief standing rests      AM-PAC 6 CLICK MOBILITY  Turning over in bed (including adjusting bedclothes, sheets and blankets)?:  4  Sitting down on and standing up from a chair with arms (e.g., wheelchair, bedside commode, etc.): 3  Moving from lying on back to sitting on the side of the bed?: 3  Moving to and from a bed to a chair (including a wheelchair)?: 3  Need to walk in hospital room?: 3  Climbing 3-5 steps with a railing?: 3  Basic Mobility Total Score: 19       Therapeutic Activities and Exercises:   pt inst'd in seated LE ex's of LAQ's and AP's    Patient left sitting on sofa with all lines intact..    GOALS:   Multidisciplinary Problems     Physical Therapy Goals        Problem: Physical Therapy    Goal Priority Disciplines Outcome Goal Variances Interventions   Physical Therapy Goal     PT, PT/OT Ongoing, Progressing     Description: Goals to be met by: 22    Patient will increase functional independence with mobility by performin. TUG in less than 12 sec to indicate not at increased risk for falls.   2. Gait x 150 feet with supervision with rollator or no AD.  3. Ascend/descend 4 step(s) with no AD and BHR.                    Time Tracking:     PT Received On: 22  PT Start Time: 1644     PT Stop Time: 1700  PT Total Time (min): 16 min     Billable Minutes: Gait Training 16    Treatment Type: Treatment  PT/PTA: PTA     PTA Visit Number: 1     2022

## 2022-06-09 NOTE — PROGRESS NOTES
ADAN spoke with Miguelina Domínguez from [facility] to discuss what the patient may need at d/c. Since the patient has returned to the hospital so quickly, ADAN and Miguelina were discussing how the d/c might be different so that the patient does better at home this time. Will follow up with this.

## 2022-06-09 NOTE — ASSESSMENT & PLAN NOTE
- started on Macrobid on 6/6/22 during an ED visit   - Urine culture is without reported growth to date   - she had a prior VRE UTI   - will dc macrobid

## 2022-06-09 NOTE — PLAN OF CARE
Patient is AAOx4. Complained of nausea overnight and medications were administered per orders. Run of V tach noted and orders were placed. Patient up to bedside commode with assistance. Dressing to sacral wound and abdomen, CDI. Colostomy intact. Patient resting comfortably at present. Bed locked in lowest position with side rails up x 2. Call light within reach of patient. Will continue purposeful rounding.

## 2022-06-09 NOTE — ASSESSMENT & PLAN NOTE
- fall precautions   -wants to continue hh, just got hh a day before admit  - therapy recommends hh

## 2022-06-09 NOTE — PROGRESS NOTES
[facility] [place] - [facility] Surg ([facility])  General Surgery  Progress Note    Subjective:     History of Present Illness:  65 yo recently discharged to home with home health after chf exacerbation, comes with shortness of breath and nausea, vomiting, abdominal pain, some drainage from  midline abdominal incision.Patient states she just got home health to see her day before yesterday.She has been having trouble with leaking of the ostomy bag at home.Does c/o nausea and vomiting yesterday and abdominal pain with eating.Patient not consistently taking her lasix but admits to taking aldactone.Continue lasix bid for chf exacerbation.      Post-Op Info:  * No surgery found *         Interval History: kimberly taylor  Continue current wound care  Ostomy teaching    Medications:  Continuous Infusions:  Scheduled Meds:   aspirin  81 mg Oral Daily    carvediloL  3.125 mg Oral BID WM    clopidogreL  75 mg Oral Daily    collagenase   Topical (Top) Daily    enoxaparin  40 mg Subcutaneous Daily    ezetimibe  10 mg Oral QHS    fluticasone propionate  1 spray Each Nostril Daily    furosemide (LASIX) injection  40 mg Intravenous Daily    pantoprazole  40 mg Oral Daily    spironolactone  12.5 mg Oral Daily     PRN Meds:acetaminophen, ALPRAZolam, HYDROcodone-acetaminophen, melatonin, sodium chloride 0.9%, sodium chloride 0.9%     Review of patient's allergies indicates:   Allergen Reactions    Augmentin [amoxicillin-pot clavulanate] Swelling     Not allergic to amoxicillin just a derivative of augmentin    Lisinopril Other (See Comments)     Fluid around heart    Losartan Other (See Comments)     High potassium    Shellfish containing products Anaphylaxis     Pt.states she is allergic to SEAFOOD since the age of 13    Levaquin [levofloxacin] Other (See Comments)     Very bad joint pain    Clindamycin Palpitations     Chest pain     Objective:     Vital Signs (Most Recent):  Temp: 98.4 °F (36.9 °C) (06/09/22 1200)  Pulse: 75 (06/09/22  1400)  Resp: 18 (06/09/22 1200)  BP: (!) 137/56 (06/09/22 1200)  SpO2: 100 % (06/09/22 1200)   Vital Signs (24h Range):  Temp:  [95.8 °F (35.4 °C)-98.4 °F (36.9 °C)] 98.4 °F (36.9 °C)  Pulse:  [63-75] 75  Resp:  [12-18] 18  SpO2:  [96 %-100 %] 100 %  BP: ()/(51-56) 137/56     Weight: 50.8 kg (111 lb 14.1 oz)  Body mass index is 19.82 kg/m².    Intake/Output - Last 3 Shifts         06/07 0700  06/08 0659 06/08 0700 06/09 0659 06/09 0700  06/10 0659    P.O.  600 180    I.V. (mL/kg)  46.4 (0.9)     Total Intake(mL/kg)  646.4 (12.7) 180 (3.6)    Urine (mL/kg/hr) 750 2300 (1.9)     Total Output 750 2300     Net -750 -1653.7 +180           Urine Occurrence   1 x    Stool Occurrence   0 x            Physical Exam  Vitals and nursing note reviewed.   Constitutional:       Appearance: She is well-developed.   HENT:      Head: Normocephalic and atraumatic.   Cardiovascular:      Rate and Rhythm: Normal rate.      Heart sounds: Normal heart sounds.   Pulmonary:      Effort: Pulmonary effort is normal.   Abdominal:      General: Bowel sounds are normal. There is no distension.      Palpations: Abdomen is soft.      Tenderness: There is no abdominal tenderness.   Musculoskeletal:         General: Normal range of motion.      Cervical back: Normal range of motion.   Skin:     General: Skin is warm and dry.      Capillary Refill: Capillary refill takes less than 2 seconds.   Neurological:      Mental Status: She is alert and oriented to person, place, and time.   Psychiatric:         Behavior: Behavior normal.       Significant Labs:  I have reviewed all pertinent lab results within the past 24 hours.  CBC:   Recent Labs   Lab 06/09/22  0519   WBC 2.89*   RBC 3.89*   HGB 11.1*   HCT 34.6*      MCV 89   MCH 28.5   MCHC 32.1     CMP:   Recent Labs   Lab 06/08/22  0107 06/08/22  2309 06/09/22  0519   *   < > 108   CALCIUM 8.9   < > 8.6*   ALBUMIN 2.1*  --   --    PROT 6.7  --   --    *   < > 135*   K 3.8    < > 4.2   CO2 24   < > 31*   CL 96   < > 94*   BUN 18   < > 18   CREATININE 0.8   < > 0.7   ALKPHOS 138*  --   --    ALT 15  --   --    AST 27  --   --    BILITOT 0.8  --   --     < > = values in this interval not displayed.           Assessment/Plan:     Superficial dehiscence of operation wound  Patient with a superficial wound dehiscence and her lower midline incision.  Recommend local wound care for now.  No sign of a deep abscess or obvious infection.  Plan discussed with hospitalist.          Stu Jacobsen MD  General Surgery  [facility] Surg [facility])

## 2022-06-09 NOTE — PT/OT/SLP PROGRESS
Occupational Therapy      Patient Name:  Odette Hart   MRN:  [id number]    Attempted to see pt twice this date.  On first attempt at 13:04 wound care RN present with pt.  On second attempt at 14:03 pt sitting up in chair eating lunch.  Per pt she has been getting up and walking in room well this date, but states her legs feel heavy.  Will follow-up at next scheduled session.    WILL Hess  6/9/2022

## 2022-06-09 NOTE — HOSPITAL COURSE
Patient diuresed with improvement in her shortness of breath and lower extremity swelling.  Able to tolerate her diet.  She was seen by surgery and wound care ordered to evaluate the ostomy and midline incision with dehisced areas.  In addition to dehiscence the wound care nurse noted multiple pustular lesions scattered on the patient's buttocks and abdomen that were new.  She was started on IV vancomycin on 6/15 with improvement in lesions. Surgery re-evaluated and recommended continued wound care and antibiotics.  As she will need daily wound care SNF was consulted.Patient with accepting facility but she refused to go.Vancomycin was changed to oral doxycycline for completion of antibiotics.Patients original home health agency refused to take her back.Patient was notified that it has been difficult to get home health agency for her but she still wanted to go home.She was taught how to do her abdominal wound care.The patient would have to do wound care at home even with home health as they would not provide daily wound care at home.Also referral sent to follow with wound care/ostomy clinic at [facility] , appointment could not be made as the provider was out of office.She will follow with her primary surgeon  as well for wound check and follow up.Patients insurance was trying to find her home health agency on follow up.Patient was ambulating good while in hospital with walker by herself and therapy discharged her from their services.

## 2022-06-09 NOTE — SUBJECTIVE & OBJECTIVE
Interval History: kimberly taylor  Continue current wound care  Ostomy teaching    Medications:  Continuous Infusions:  Scheduled Meds:   aspirin  81 mg Oral Daily    carvediloL  3.125 mg Oral BID WM    clopidogreL  75 mg Oral Daily    collagenase   Topical (Top) Daily    enoxaparin  40 mg Subcutaneous Daily    ezetimibe  10 mg Oral QHS    fluticasone propionate  1 spray Each Nostril Daily    furosemide (LASIX) injection  40 mg Intravenous Daily    pantoprazole  40 mg Oral Daily    spironolactone  12.5 mg Oral Daily     PRN Meds:acetaminophen, ALPRAZolam, HYDROcodone-acetaminophen, melatonin, sodium chloride 0.9%, sodium chloride 0.9%     Review of patient's allergies indicates:   Allergen Reactions    Augmentin [amoxicillin-pot clavulanate] Swelling     Not allergic to amoxicillin just a derivative of augmentin    Lisinopril Other (See Comments)     Fluid around heart    Losartan Other (See Comments)     High potassium    Shellfish containing products Anaphylaxis     Pt.states she is allergic to SEAFOOD since the age of 13    Levaquin [levofloxacin] Other (See Comments)     Very bad joint pain    Clindamycin Palpitations     Chest pain     Objective:     Vital Signs (Most Recent):  Temp: 98.4 °F (36.9 °C) (06/09/22 1200)  Pulse: 75 (06/09/22 1400)  Resp: 18 (06/09/22 1200)  BP: (!) 137/56 (06/09/22 1200)  SpO2: 100 % (06/09/22 1200)   Vital Signs (24h Range):  Temp:  [95.8 °F (35.4 °C)-98.4 °F (36.9 °C)] 98.4 °F (36.9 °C)  Pulse:  [63-75] 75  Resp:  [12-18] 18  SpO2:  [96 %-100 %] 100 %  BP: ()/(51-56) 137/56     Weight: 50.8 kg (111 lb 14.1 oz)  Body mass index is 19.82 kg/m².    Intake/Output - Last 3 Shifts         06/07 0700  06/08 0659 06/08 0700  06/09 0659 06/09 0700  06/10 0659    P.O.  600 180    I.V. (mL/kg)  46.4 (0.9)     Total Intake(mL/kg)  646.4 (12.7) 180 (3.6)    Urine (mL/kg/hr) 750 2300 (1.9)     Total Output 750 2300     Net -750 -1653.7 +180           Urine Occurrence   1 x    Stool Occurrence    0 x            Physical Exam  Vitals and nursing note reviewed.   Constitutional:       Appearance: She is well-developed.   HENT:      Head: Normocephalic and atraumatic.   Cardiovascular:      Rate and Rhythm: Normal rate.      Heart sounds: Normal heart sounds.   Pulmonary:      Effort: Pulmonary effort is normal.   Abdominal:      General: Bowel sounds are normal. There is no distension.      Palpations: Abdomen is soft.      Tenderness: There is no abdominal tenderness.   Musculoskeletal:         General: Normal range of motion.      Cervical back: Normal range of motion.   Skin:     General: Skin is warm and dry.      Capillary Refill: Capillary refill takes less than 2 seconds.   Neurological:      Mental Status: She is alert and oriented to person, place, and time.   Psychiatric:         Behavior: Behavior normal.       Significant Labs:  I have reviewed all pertinent lab results within the past 24 hours.  CBC:   Recent Labs   Lab 06/09/22  0519   WBC 2.89*   RBC 3.89*   HGB 11.1*   HCT 34.6*      MCV 89   MCH 28.5   MCHC 32.1     CMP:   Recent Labs   Lab 06/08/22  0107 06/08/22  2309 06/09/22  0519   *   < > 108   CALCIUM 8.9   < > 8.6*   ALBUMIN 2.1*  --   --    PROT 6.7  --   --    *   < > 135*   K 3.8   < > 4.2   CO2 24   < > 31*   CL 96   < > 94*   BUN 18   < > 18   CREATININE 0.8   < > 0.7   ALKPHOS 138*  --   --    ALT 15  --   --    AST 27  --   --    BILITOT 0.8  --   --     < > = values in this interval not displayed.

## 2022-06-09 NOTE — PROGRESS NOTES
[facility] Medicine  Progress Note    Patient Name: Odette Hart  MRN: [id number]  Patient Class: IP- Inpatient   Admission Date: 6/8/2022  Length of Stay: 1 days  Attending Physician: Sanjuanita Briones MD  Primary Care Provider: Braxton Barragan MD        Subjective:     Principal Problem:Acute on chronic congestive heart failure        HPI:  Ms. Odette Hart is a 66 y.o. female, with PMH of HFrEF (EF 20%), CAD s/p 2x HEVER placement in 1/2022 (on Plavix), Ischemic Cardiomyopathy s/p ICD placement, RBBB, HTN, calculus cholecystitis s/p recent cholecystectomy, colovesicular fistula 2/2 complicated diverticulitis s/p cystoscopy with BL ureteral catheters, open sigmoid colon resection, appendectomy, and creation of end colostomy on 5/9/22, anemia, chronic hypoxemic respiratory failure (not on home O2), MDD, Debility, who [place] [facility] [place]/8/22 due to shortness of breath. She notes sudden onset a few days ago, with worsening tonight. She states this is worse with exertion. She also notes pain around her colostomy site over the past several days. She was seen in the ED due to this pain on 6/6/22 and was diagnosed with a UTI, and discharged on Macrobid. She states she had two episodes of emesis yesterday. She was evaluated in the ED with labs showing anemia with H&H of 10.9/33.1, ALP of 138, BNP of >4900. CXR showed prominent pulmonary vasculature, with interstitial and patchy alveolar infiltrates, and without pleural effusion. She was treated in the ED with 40 mg IV lasix. She was admitted to inpatient status.       Overview/Hospital Course:  Improved breathing and swelling with diuresis.      Interval History: c/o pain in her buttock, passing flatus but not much output in bag, tolerating diet    Review of Systems   Constitutional:  Negative for chills and fever.   HENT:  Negative for trouble swallowing.    Respiratory:  Negative for cough and shortness of breath.     Cardiovascular:  Negative for chest pain and leg swelling.   Gastrointestinal:  Positive for abdominal pain. Negative for blood in stool, nausea and vomiting.   Genitourinary:  Negative for dysuria and hematuria.   Musculoskeletal:  Negative for gait problem.   Skin:  Positive for wound.   Neurological:  Positive for weakness. Negative for headaches.   Psychiatric/Behavioral:  Negative for confusion.    Objective:     Vital Signs (Most Recent):  Temp: 98.1 °F (36.7 °C) (06/09/22 1633)  Pulse: 69 (06/09/22 1633)  Resp: 18 (06/09/22 1633)  BP: (!) 91/53 (06/09/22 1633)  SpO2: 100 % (06/09/22 1633)   Vital Signs (24h Range):  Temp:  [97.3 °F (36.3 °C)-98.4 °F (36.9 °C)] 98.1 °F (36.7 °C)  Pulse:  [63-75] 69  Resp:  [18] 18  SpO2:  [96 %-100 %] 100 %  BP: ()/(53-56) 91/53     Weight: 50.8 kg (111 lb 14.1 oz)  Body mass index is 19.82 kg/m².    Intake/Output Summary (Last 24 hours) at 6/9/2022 1753  Last data filed at 6/9/2022 1600  Gross per 24 hour   Intake 960 ml   Output 1400 ml   Net -440 ml      Physical Exam  Vitals reviewed.   Constitutional:       General: She is not in acute distress.     Appearance: She is well-developed. She is ill-appearing.   HENT:      Head: Normocephalic and atraumatic.   Eyes:      Extraocular Movements: Extraocular movements intact.      Pupils: Pupils are equal, round, and reactive to light.   Cardiovascular:      Rate and Rhythm: Normal rate and regular rhythm.   Pulmonary:      Effort: Pulmonary effort is normal. No respiratory distress.      Breath sounds: Normal breath sounds.   Abdominal:      General: Bowel sounds are normal. There is no distension.      Palpations: Abdomen is soft.      Tenderness: There is no abdominal tenderness.      Comments: Dressing with small amount of drainage   Musculoskeletal:         General: No swelling. Normal range of motion.      Cervical back: Normal range of motion and neck supple.   Skin:     General: Skin is warm.      Comments: Sacral  wound healing, small left buttock wound dressed   Neurological:      General: No focal deficit present.      Mental Status: She is alert and oriented to person, place, and time.   Psychiatric:         Mood and Affect: Mood normal.         Behavior: Behavior normal.       Significant Labs: All pertinent labs within the past 24 hours have been reviewed.    Significant Imaging: I have reviewed all pertinent imaging results/findings within the past 24 hours.      Assessment/Plan:      * Acute on chronic congestive heart failure  - Ms. Odette Hart presents with shortness of breath  - BNP is elevated at >4900   - CXR showed prominent pulmonary vasculature, with interstitial and patchy alveolar infiltrates, and without pleural effusion  - s/p 40 mg IV lasix in ED   - last Echo was 5/24/2022  Summary  · The left ventricle is severely enlarged with eccentric hypertrophy and severely decreased systolic function.  · Severe left atrial enlargement.  · Grade III left ventricular diastolic dysfunction.  · Normal right ventricular size with normal right ventricular systolic function.  · Mild-to-moderate mitral regurgitation.  · Mild tricuspid regurgitation.  · There is mild pulmonary hypertension.  - CHF pathways initiated   - monitor during diuresis   - decreased lasix to 40 mg iv daily, will change to oral in am      Superficial dehiscence of operation wound  appr sx recs  Wound care  Had leaking ostomy at home, doing good here      CAD (coronary artery disease)  - continue Plavix , asa, zetia  - 1/24/2022: [facility]: Cath: LAD: Proximal stent patent. LCX: Proximal 80%. LCX: Dominant. Moderate disease. LCX: HEVER 2.75 x 18 mm. Distal dissection. HEVER 2.75 x 22 mm.       Debility  - fall precautions   -wants to continue hh, just got hh a day before admit  - therapy recommends hh      Anemia  - H&H appear to be at approximate baseline   - monitor       Acute cystitis without hematuria  - started on Macrobid on 6/6/22 during an ED  visit   - Urine culture is without reported growth to date   - she had a prior VRE UTI   - will dc macrobid    Primary hypertension  - mildly hypotensive with preserved MAP at time of admission   - home meds: carvedilol 3.125 mg BID, lasix 20 mg QD, spironolactone 12.5 mg QD  - monitor     Ischemic cardiomyopathy  - history noted   - s/p ICD placement         VTE Risk Mitigation (From admission, onward)         Ordered     enoxaparin injection 40 mg  Daily         06/08/22 1122     IP VTE HIGH RISK PATIENT  Once         06/08/22 0253     Place sequential compression device  Until discontinued         06/08/22 0253     Place sequential compression device  Until discontinued         06/08/22 0252                Discharge Planning   SIVAN:      Code Status: Full Code   Is the patient medically ready for discharge?:     Reason for patient still in hospital (select all that apply): Patient trending condition and Treatment                     Sanjuanita Briones MD  Department of Hospital Medicine   [facility] Med Surg ([facility])

## 2022-06-09 NOTE — SUBJECTIVE & OBJECTIVE
Interval History: c/o pain in her buttock, passing flatus but not much output in bag, tolerating diet    Review of Systems   Constitutional:  Negative for chills and fever.   HENT:  Negative for trouble swallowing.    Respiratory:  Negative for cough and shortness of breath.    Cardiovascular:  Negative for chest pain and leg swelling.   Gastrointestinal:  Positive for abdominal pain. Negative for blood in stool, nausea and vomiting.   Genitourinary:  Negative for dysuria and hematuria.   Musculoskeletal:  Negative for gait problem.   Skin:  Positive for wound.   Neurological:  Positive for weakness. Negative for headaches.   Psychiatric/Behavioral:  Negative for confusion.    Objective:     Vital Signs (Most Recent):  Temp: 98.1 °F (36.7 °C) (06/09/22 1633)  Pulse: 69 (06/09/22 1633)  Resp: 18 (06/09/22 1633)  BP: (!) 91/53 (06/09/22 1633)  SpO2: 100 % (06/09/22 1633)   Vital Signs (24h Range):  Temp:  [97.3 °F (36.3 °C)-98.4 °F (36.9 °C)] 98.1 °F (36.7 °C)  Pulse:  [63-75] 69  Resp:  [18] 18  SpO2:  [96 %-100 %] 100 %  BP: ()/(53-56) 91/53     Weight: 50.8 kg (111 lb 14.1 oz)  Body mass index is 19.82 kg/m².    Intake/Output Summary (Last 24 hours) at 6/9/2022 1753  Last data filed at 6/9/2022 1600  Gross per 24 hour   Intake 960 ml   Output 1400 ml   Net -440 ml      Physical Exam  Vitals reviewed.   Constitutional:       General: She is not in acute distress.     Appearance: She is well-developed. She is ill-appearing.   HENT:      Head: Normocephalic and atraumatic.   Eyes:      Extraocular Movements: Extraocular movements intact.      Pupils: Pupils are equal, round, and reactive to light.   Cardiovascular:      Rate and Rhythm: Normal rate and regular rhythm.   Pulmonary:      Effort: Pulmonary effort is normal. No respiratory distress.      Breath sounds: Normal breath sounds.   Abdominal:      General: Bowel sounds are normal. There is no distension.      Palpations: Abdomen is soft.      Tenderness:  There is no abdominal tenderness.      Comments: Dressing with small amount of drainage   Musculoskeletal:         General: No swelling. Normal range of motion.      Cervical back: Normal range of motion and neck supple.   Skin:     General: Skin is warm.      Comments: Sacral wound healing, small left buttock wound dressed   Neurological:      General: No focal deficit present.      Mental Status: She is alert and oriented to person, place, and time.   Psychiatric:         Mood and Affect: Mood normal.         Behavior: Behavior normal.       Significant Labs: All pertinent labs within the past 24 hours have been reviewed.    Significant Imaging: I have reviewed all pertinent imaging results/findings within the past 24 hours.

## 2022-06-09 NOTE — NURSING
Received report from JUAN Jacobs. Pt lying in bed, alert and oriented x 4. Pt lying on side due to pain from wound on buttocks. Pt denies need for pain medication at this time. Bed in lowest position, Call light in reach. Side rails up x 2.

## 2022-06-09 NOTE — ASSESSMENT & PLAN NOTE
- Ms. Odette Hart presents with shortness of breath  - BNP is elevated at >4900   - CXR showed prominent pulmonary vasculature, with interstitial and patchy alveolar infiltrates, and without pleural effusion  - s/p 40 mg IV lasix in ED   - last Echo was 5/24/2022  Summary  · The left ventricle is severely enlarged with eccentric hypertrophy and severely decreased systolic function.  · Severe left atrial enlargement.  · Grade III left ventricular diastolic dysfunction.  · Normal right ventricular size with normal right ventricular systolic function.  · Mild-to-moderate mitral regurgitation.  · Mild tricuspid regurgitation.  · There is mild pulmonary hypertension.  - CHF pathways initiated   - monitor during diuresis   - decreased lasix to 40 mg iv daily, will change to oral in am

## 2022-06-10 NOTE — PLAN OF CARE
Problem: Occupational Therapy  Goal: Occupational Therapy Goal  Description: Goals to be met by: 6/24/2022     Patient will increase functional independence with ADLs by performing:    UE Dressing with Modified [place].  LE Dressing with Modified [place].  Grooming while standing at sink with Modified [place].  Toileting from bedside commode with Modified [place] for hygiene and clothing management.   Toilet transfer to bedside commode with Modified [place].    Outcome: Ongoing, Progressing     OT goals established at this time.  Continued recommendation of post acute HH OT/PT.  To benefit from continued acute care OT services to increase independence in self-care/functional transfers.  Continue POC.

## 2022-06-10 NOTE — PT/OT/SLP PROGRESS
Occupational Therapy   Treatment    Name: Odette Hart  MRN: [id number]  Admitting Diagnosis:  Acute on chronic congestive heart failure       Recommendations:     Discharge Recommendations: home health PT, home health OT  Discharge Equipment Recommendations:  colostomy/ostomy supplies  Barriers to discharge:   (current functional level)    Assessment:   SBA for functional transfers and ambulation with MIN verbal cuing for locking/maintaining rollator during task.  Arms tiring during standing ADL task requiring task modification with good follow through of instruction.  Short seated rest breaks needed during BUE exercise due to decreased endurance; mildly labored breathing noted after each exercise requiring rest and deep breathing tech with good follow through.  OT goals established at this time.  Continued recommendation of post acute HH OT/PT.  To benefit from continued acute care OT services to increase independence in self-care/functional transfers.  Continue POC.     Odette Hart is a 66 y.o. female with a medical diagnosis of Acute on chronic congestive heart failure.  She presents with below deficits decreasing independence in self-care/functional transfers. Performance deficits affecting function are weakness, impaired endurance, impaired self care skills, impaired functional mobilty, gait instability, impaired balance, impaired cardiopulmonary response to activity, pain, decreased safety awareness, impaired skin.     Rehab Prognosis:  Good; patient would benefit from acute skilled OT services to address these deficits and reach maximum level of function.       Plan:     Patient to be seen 3 x/week to address the above listed problems via self-care/home management, therapeutic activities, therapeutic exercises  · Plan of Care Expires: 06/24/22  · Plan of Care Reviewed with: patient    Subjective     Pain/Comfort:  · Pain Rating 1: 5/10  · Location - Orientation 1: generalized (Pt. attributed pain  to gas.)  · Location 1: abdomen  · Pain Addressed 1: Nurse notified, Distraction  · Pain Rating Post-Intervention 1: 5/10    Objective:     Communicated with: Chelsea REYES RN prior to session.  Patient found up in chair with peripheral IV, telemetry, colostomy upon OT entry to room.    General Precautions: Standard, fall (cardiac diet)   Orthopedic Precautions:N/A   Braces: N/A  Respiratory Status: Room air     Occupational Performance:     Vitals:  Position: Seated  O2:  99%  HR: 72 BPM  BP: 100/56 @ LUE    Functional Mobility/Transfers:  · Sit>stand bedside chair to rollator with verbal cue for locking prior to stance and ambulating short household distance bedside chair<>bathroom with verbal cue needed to maintain rollator throughout entire step transfer to bedside chair all with SBA.    Activities of Daily Living:  · Required retrieval and setup of shampoo cap with oral hygiene supplies already present at sink.  Performed oral hygiene while in stance at sink with MIN verbal cuing for safe rollator positioning at sink.  Pt. With c/o arms getting tired while standing upright and scrubbing head through shampoo cap; given instruction on resting elbows on sink while scrubbing to rest shoulders during task with good follow through.  Pt. Combing hair while in stance with retrieval of comb needed; Pt. Encouraged to wrap towel as would turban around wet hair with good follow through and assist needed to tuck towel at posterior neck to secure it.       [facility] 6 Click ADL: 19    Treatment & Education:  · Sit>stand bedside chair to rollator with verbal cue for locking prior to stance and ambulating short household distance bedside chair<>bathroom with verbal cue needed to maintain rollator throughout entire step transfer to bedside chair all with SBA.  · Required retrieval and setup of shampoo cap with oral hygiene supplies already present at sink.  Performed oral hygiene while in stance at sink with MIN verbal cuing for safe  rollator positioning at sink.  Pt. With c/o arms getting tired while standing upright and scrubbing head through shampoo cap; given instruction on resting elbows on sink while scrubbing to rest shoulders during task with good follow through.  Pt. Combing hair while in stance with retrieval of comb needed; Pt. Encouraged to wrap towel as would turban around wet hair with good follow through and assist needed to tuck towel at posterior neck to secure it.   · Participated in BUE exercise for shoulder flex/ext, forward punches, and forward rows followed by scapular retraction X 8 each and BUE small circumduction with extended UE in scapular plane at 90 degrees X 15; requiring seated rest breaks between sets with MIN verbal cuing and good follow through of deep breathing tech.  For BUE strengthening and endurance needed to increase independence in ADL/functional transfers.    Patient left up in chair with all lines intact, call button in reach and nursing notifiedEducation:      GOALS:   Multidisciplinary Problems     Occupational Therapy Goals        Problem: Occupational Therapy    Goal Priority Disciplines Outcome Interventions   Occupational Therapy Goal     OT, PT/OT Ongoing, Progressing    Description: Goals to be met by: 6/24/2022     Patient will increase functional independence with ADLs by performing:    UE Dressing with Modified [place].  LE Dressing with Modified [place].  Grooming while standing at sink with Modified [place].  Toileting from bedside commode with Modified [place] for hygiene and clothing management.   Toilet transfer to bedside commode with Modified [place].                     Time Tracking:     OT Date of Treatment: 06/10/22  OT Start Time: 1051  OT Stop Time: 1125  OT Total Time (min): 34 min (5-minutes with RN interjecting for admin of meds)    Billable Minutes:Self Care/Home Management 19  Therapeutic Exercise 10    OT/FROILAN: OT          6/10/2022

## 2022-06-10 NOTE — PROGRESS NOTES
[facility], [place] Medicine  Progress Note    Patient Name: Odette Hart  MRN: [id number]  Patient Class: IP- Inpatient   Admission Date: 6/8/2022  Length of Stay: 2 days  Attending Physician: Sanjuanita Briones MD  Primary Care Provider: Braxton Barragan MD        Subjective:     Principal Problem:Acute on chronic congestive heart failure        HPI:  Ms. Odette Hart is a 66 y.o. female, with PMH of HFrEF (EF 20%), CAD s/p 2x HEVER placement in 1/2022 (on Plavix), Ischemic Cardiomyopathy s/p ICD placement, RBBB, HTN, calculus cholecystitis s/p recent cholecystectomy, colovesicular fistula 2/2 complicated diverticulitis s/p cystoscopy with BL ureteral catheters, open sigmoid colon resection, appendectomy, and creation of end colostomy on 5/9/22, anemia, chronic hypoxemic respiratory failure (not on home O2), MDD, Debility, who [place] [facility] [place] due to shortness of breath. She notes sudden onset a few days ago, with worsening tonight. She states this is worse with exertion. She also notes pain around her colostomy site over the past several days. She was seen in the ED due to this pain on 6/6/22 and was diagnosed with a UTI, and discharged on Macrobid. She states she had two episodes of emesis yesterday. She was evaluated in the ED with labs showing anemia with H&H of 10.9/33.1, ALP of 138, BNP of >4900. CXR showed prominent pulmonary vasculature, with interstitial and patchy alveolar infiltrates, and without pleural effusion. She was treated in the ED with 40 mg IV lasix. She was admitted to inpatient status.       Overview/Hospital Course:  Improved breathing and swelling with diuresis.      Interval History: passing flatus but not much output in bag, tolerating diet, no nausea, vomiting, c/o gas pains.    Review of Systems   Constitutional:  Negative for chills and fever.   HENT:  Negative for trouble swallowing.    Respiratory:  Negative for cough and shortness of breath.     Cardiovascular:  Negative for chest pain and leg swelling.   Gastrointestinal:  Positive for abdominal pain. Negative for blood in stool, nausea and vomiting.   Genitourinary:  Negative for dysuria and hematuria.   Musculoskeletal:  Negative for gait problem.   Skin:  Positive for wound.   Neurological:  Positive for weakness. Negative for headaches.   Psychiatric/Behavioral:  Negative for confusion.    Objective:     Vital Signs (Most Recent):  Temp: 97.7 °F (36.5 °C) (06/10/22 1624)  Pulse: 72 (06/10/22 1624)  Resp: 18 (06/10/22 1624)  BP: (!) 91/50 (06/10/22 1624)  SpO2: 98 % (06/10/22 1624)   Vital Signs (24h Range):  Temp:  [97.5 °F (36.4 °C)-98 °F (36.7 °C)] 97.7 °F (36.5 °C)  Pulse:  [59-80] 72  Resp:  [17-19] 18  SpO2:  [98 %-99 %] 98 %  BP: ()/(47-60) 91/50     Weight: 49.7 kg (109 lb 10.6 oz)  Body mass index is 19.43 kg/m².    Intake/Output Summary (Last 24 hours) at 6/10/2022 1642  Last data filed at 6/10/2022 1000  Gross per 24 hour   Intake 720 ml   Output 1800 ml   Net -1080 ml        Physical Exam  Vitals reviewed.   Constitutional:       General: She is not in acute distress.     Appearance: She is well-developed. She is ill-appearing.   HENT:      Head: Normocephalic and atraumatic.   Eyes:      Extraocular Movements: Extraocular movements intact.      Pupils: Pupils are equal, round, and reactive to light.   Cardiovascular:      Rate and Rhythm: Normal rate and regular rhythm.   Pulmonary:      Effort: Pulmonary effort is normal. No respiratory distress.      Breath sounds: Normal breath sounds.   Abdominal:      General: Bowel sounds are normal. There is no distension.      Palpations: Abdomen is soft.      Tenderness: There is no abdominal tenderness.      Comments: Dressing with small amount of drainage   Musculoskeletal:         General: No swelling. Normal range of motion.      Cervical back: Normal range of motion and neck supple.   Skin:     General: Skin is warm.      Comments:  Sacral wound healing, small left buttock wound dressed   Neurological:      General: No focal deficit present.      Mental Status: She is alert and oriented to person, place, and time.   Psychiatric:         Mood and Affect: Mood normal.         Behavior: Behavior normal.       Significant Labs: All pertinent labs within the past 24 hours have been reviewed.    Significant Imaging: I have reviewed all pertinent imaging results/findings within the past 24 hours.      Assessment/Plan:      * Acute on chronic congestive heart failure  - Ms. Odette Hart presents with shortness of breath  - BNP is elevated at >4900   - CXR showed prominent pulmonary vasculature, with interstitial and patchy alveolar infiltrates, and without pleural effusion  - s/p 40 mg IV lasix in ED   - last Echo was 5/24/2022  Summary  · The left ventricle is severely enlarged with eccentric hypertrophy and severely decreased systolic function.  · Severe left atrial enlargement.  · Grade III left ventricular diastolic dysfunction.  · Normal right ventricular size with normal right ventricular systolic function.  · Mild-to-moderate mitral regurgitation.  · Mild tricuspid regurgitation.  · There is mild pulmonary hypertension.  - CHF pathways initiated   - monitor during diuresis   - decreased lasix to 40 mg iv daily,  - changed to 20 mg oral daily with 12.5 mg aldactone      Superficial dehiscence of operation wound  appr sx recs  Wound care  Had leaking ostomy at home, doing good here      CAD (coronary artery disease)  - continue Plavix , asa, zetia  - 1/24/2022: [facility]: Cath: LAD: Proximal stent patent. LCX: Proximal 80%. LCX: Dominant. Moderate disease. LCX: HEVER 2.75 x 18 mm. Distal dissection. HEVER 2.75 x 22 mm.       Debility  - fall precautions   -wants to continue hh, just got hh a day before admit  - therapy recommends hh  - will not be accepted back by previous hh company      Anemia  - H&H appear to be at approximate baseline   -  monitor       Acute cystitis without hematuria  - started on Macrobid on 6/6/22 during an ED visit   - Urine culture is without reported growth to date   - she had a prior VRE UTI   - will dc macrobid    Primary hypertension  - mildly hypotensive with preserved MAP at time of admission   - home meds: carvedilol 3.125 mg BID, lasix 20 mg QD, spironolactone 12.5 mg QD  - monitor     Ischemic cardiomyopathy  - history noted   - s/p ICD placement         VTE Risk Mitigation (From admission, onward)         Ordered     enoxaparin injection 40 mg  Daily         06/08/22 1122     IP VTE HIGH RISK PATIENT  Once         06/08/22 0253     Place sequential compression device  Until discontinued         06/08/22 0253     Place sequential compression device  Until discontinued         06/08/22 0252                Discharge Planning   SIVAN:      Code Status: Full Code   Is the patient medically ready for discharge?:     Reason for patient still in hospital (select all that apply): Patient trending condition and Treatment                     Sanjuanita Briones MD  Department of Hospital Medicine   [facility] Med S[facility]

## 2022-06-10 NOTE — ASSESSMENT & PLAN NOTE
- fall precautions   -wants to continue hh, just got hh a day before admit  - therapy recommends hh  - will not be accepted back by previous hh company

## 2022-06-10 NOTE — PROGRESS NOTES
RSW called patient over the phone and patient has no additional needs. MALKAW scheduled patient follow up with PCP.     RADHA Madrigal

## 2022-06-10 NOTE — PLAN OF CARE
Patient A&Ox4. Pain managed with PRN meds. Wound care completed tolerated well. Patient up with PT / OT tolerated well. Purposeful hourly rounding completed. Call light within reach. Side railsX2       Problem: Adult Inpatient Plan of Care  Goal: Plan of Care Review  Outcome: Ongoing, Progressing  Goal: Patient-Specific Goal (Individualized)  Outcome: Ongoing, Progressing  Goal: Absence of Hospital-Acquired Illness or Injury  Outcome: Ongoing, Progressing  Goal: Readiness for Transition of Care  Outcome: Ongoing, Progressing     Problem: Impaired Wound Healing  Goal: Optimal Wound Healing  Outcome: Ongoing, Progressing     Problem: Fall Injury Risk  Goal: Absence of Fall and Fall-Related Injury  Outcome: Ongoing, Progressing     Problem: Skin Injury Risk Increased  Goal: Skin Health and Integrity  Outcome: Ongoing, Progressing

## 2022-06-10 NOTE — PLAN OF CARE
Pt ambulating in room without assistance. Wound cleaned and dressing changed via Wound Care.     Problem: Adult Inpatient Plan of Care  Goal: Plan of Care Review  Outcome: Ongoing, Progressing  Goal: Patient-Specific Goal (Individualized)  Outcome: Ongoing, Progressing  Goal: Absence of Hospital-Acquired Illness or Injury  Outcome: Ongoing, Progressing  Goal: Optimal Comfort and Wellbeing  Outcome: Ongoing, Progressing  Goal: Readiness for Transition of Care  Outcome: Ongoing, Progressing     Problem: Impaired Wound Healing  Goal: Optimal Wound Healing  Outcome: Ongoing, Progressing     Problem: Fall Injury Risk  Goal: Absence of Fall and Fall-Related Injury  Outcome: Ongoing, Progressing     Problem: Skin Injury Risk Increased  Goal: Skin Health and Integrity  Outcome: Ongoing, Progressing

## 2022-06-10 NOTE — PLAN OF CARE
Pt AOX4 and ambulatory. Pt anxiety managed with PRN medication. No major complaints overnight. No acute distress noted. Will continue to monitor.     Problem: Adult Inpatient Plan of Care  Goal: Plan of Care Review  Outcome: Ongoing, Progressing     Problem: Impaired Wound Healing  Goal: Optimal Wound Healing  Outcome: Ongoing, Progressing     Problem: Fall Injury Risk  Goal: Absence of Fall and Fall-Related Injury  Outcome: Ongoing, Progressing     Problem: Skin Injury Risk Increased  Goal: Skin Health and Integrity  Outcome: Ongoing, Progressing

## 2022-06-10 NOTE — ASSESSMENT & PLAN NOTE
- Ms. Odette Hart presents with shortness of breath  - BNP is elevated at >4900   - CXR showed prominent pulmonary vasculature, with interstitial and patchy alveolar infiltrates, and without pleural effusion  - s/p 40 mg IV lasix in ED   - last Echo was 5/24/2022  Summary  · The left ventricle is severely enlarged with eccentric hypertrophy and severely decreased systolic function.  · Severe left atrial enlargement.  · Grade III left ventricular diastolic dysfunction.  · Normal right ventricular size with normal right ventricular systolic function.  · Mild-to-moderate mitral regurgitation.  · Mild tricuspid regurgitation.  · There is mild pulmonary hypertension.  - CHF pathways initiated   - monitor during diuresis   - decreased lasix to 40 mg iv daily,  - changed to 20 mg oral daily with 12.5 mg aldactone

## 2022-06-10 NOTE — SUBJECTIVE & OBJECTIVE
Interval History: passing flatus but not much output in bag, tolerating diet, no nausea, vomiting, c/o gas pains.    Review of Systems   Constitutional:  Negative for chills and fever.   HENT:  Negative for trouble swallowing.    Respiratory:  Negative for cough and shortness of breath.    Cardiovascular:  Negative for chest pain and leg swelling.   Gastrointestinal:  Positive for abdominal pain. Negative for blood in stool, nausea and vomiting.   Genitourinary:  Negative for dysuria and hematuria.   Musculoskeletal:  Negative for gait problem.   Skin:  Positive for wound.   Neurological:  Positive for weakness. Negative for headaches.   Psychiatric/Behavioral:  Negative for confusion.    Objective:     Vital Signs (Most Recent):  Temp: 97.7 °F (36.5 °C) (06/10/22 1624)  Pulse: 72 (06/10/22 1624)  Resp: 18 (06/10/22 1624)  BP: (!) 91/50 (06/10/22 1624)  SpO2: 98 % (06/10/22 1624)   Vital Signs (24h Range):  Temp:  [97.5 °F (36.4 °C)-98 °F (36.7 °C)] 97.7 °F (36.5 °C)  Pulse:  [59-80] 72  Resp:  [17-19] 18  SpO2:  [98 %-99 %] 98 %  BP: ()/(47-60) 91/50     Weight: 49.7 kg (109 lb 10.6 oz)  Body mass index is 19.43 kg/m².    Intake/Output Summary (Last 24 hours) at 6/10/2022 1642  Last data filed at 6/10/2022 1000  Gross per 24 hour   Intake 720 ml   Output 1800 ml   Net -1080 ml        Physical Exam  Vitals reviewed.   Constitutional:       General: She is not in acute distress.     Appearance: She is well-developed. She is ill-appearing.   HENT:      Head: Normocephalic and atraumatic.   Eyes:      Extraocular Movements: Extraocular movements intact.      Pupils: Pupils are equal, round, and reactive to light.   Cardiovascular:      Rate and Rhythm: Normal rate and regular rhythm.   Pulmonary:      Effort: Pulmonary effort is normal. No respiratory distress.      Breath sounds: Normal breath sounds.   Abdominal:      General: Bowel sounds are normal. There is no distension.      Palpations: Abdomen is soft.       Tenderness: There is no abdominal tenderness.      Comments: Dressing with small amount of drainage   Musculoskeletal:         General: No swelling. Normal range of motion.      Cervical back: Normal range of motion and neck supple.   Skin:     General: Skin is warm.      Comments: Sacral wound healing, small left buttock wound dressed   Neurological:      General: No focal deficit present.      Mental Status: She is alert and oriented to person, place, and time.   Psychiatric:         Mood and Affect: Mood normal.         Behavior: Behavior normal.       Significant Labs: All pertinent labs within the past 24 hours have been reviewed.    Significant Imaging: I have reviewed all pertinent imaging results/findings within the past 24 hours.

## 2022-06-10 NOTE — PT/OT/SLP PROGRESS
Physical Therapy Treatment    Patient Name:  Odette Hart   MRN:  [id number]    Recommendations:     Discharge Recommendations:  home health PT, home health OT   Discharge Equipment Recommendations: colostomy/ostomy supplies   Barriers to discharge: None    Assessment:     Odette Hart is a 66 y.o. female admitted with a medical diagnosis of Acute on chronic congestive heart failure.  She presents with the following impairments/functional limitations:  weakness, impaired endurance, impaired self care skills, impaired functional mobilty, gait instability, pain, impaired skin, impaired cardiopulmonary response to activity ;pt with good mobility today, amb'd w/ rollator and SBA, NO LOB or SOB noted.    Rehab Prognosis: Good; patient would benefit from acute skilled PT services to address these deficits and reach maximum level of function.    Recent Surgery: * No surgery found *      Plan:     During this hospitalization, patient to be seen 3 x/week to address the identified rehab impairments via gait training, therapeutic activities, therapeutic exercises, neuromuscular re-education and progress toward the following goals:    · Plan of Care Expires:  06/22/22    Subjective     Chief Complaint: some abd pain, and pt c/o foul odor coming from top of incision in abdomen.  Patient/Family Comments/goals: pt agreeable to session, feels better after walking.  Pain/Comfort:  · Pain Rating 1:  (did not rate)  · Location - Orientation 1: generalized  · Location 1: abdomen  · Pain Addressed 1: Reposition, Distraction      Objective:     Communicated with nurse prior to session.  Patient found up in chair with colostomy, telemetry, peripheral IV upon PT entry to room.     General Precautions: Standard, fall (cardiac diet)   Orthopedic Precautions:N/A   Braces: N/A  Respiratory Status: Room air     Functional Mobility:  · Transfers:     · Sit to Stand:  modified independence with 4 wheeled walker  · Gait: amb'd  ~300' w/  rollator and SBA, ~4 brief standing rests      AM-PAC 6 CLICK MOBILITY  Turning over in bed (including adjusting bedclothes, sheets and blankets)?: 4  Sitting down on and standing up from a chair with arms (e.g., wheelchair, bedside commode, etc.): 4  Moving from lying on back to sitting on the side of the bed?: 3  Moving to and from a bed to a chair (including a wheelchair)?: 4  Need to walk in hospital room?: 3  Climbing 3-5 steps with a railing?: 3  Basic Mobility Total Score: 21       Therapeutic Activities and Exercises:   pt to perform LE ex's on her own.    Patient left up in chair with all lines intact, call button in reach and nurse notified..    GOALS:   Multidisciplinary Problems     Physical Therapy Goals        Problem: Physical Therapy    Goal Priority Disciplines Outcome Goal Variances Interventions   Physical Therapy Goal     PT, PT/OT Ongoing, Progressing     Description: Goals to be met by: 22    Patient will increase functional independence with mobility by performin. TUG in less than 12 sec to indicate not at increased risk for falls.   2. Gait x 150 feet with supervision with rollator or no AD.  3. Ascend/descend 4 step(s) with no AD and BHR.                    Time Tracking:     PT Received On: 06/10/22  PT Start Time: 1018     PT Stop Time: 1034  PT Total Time (min): 16 min     Billable Minutes: Gait Training 16    Treatment Type: Treatment  PT/PTA: PTA     PTA Visit Number: 2     06/10/2022

## 2022-06-11 NOTE — PROGRESS NOTES
"ADAN and Miguelina from [facility] spoke to discuss the patient's quick re-admission to the hospital. Her home health was with [facility]. But didn't start until days after her d/c. The patient really needed the home health to start sooner. ADAN called to check if the patient's HH would be able to continue the service as soon as she is d/c'd this time. ADAN was told by Sabrina, nurse at [place], that the patient "wasn't HH appropriate" . They believed she wasn't well/strong enough to stay alone. Sabrina checked with her supervisor who said they would not take the patient back after d/c. ADAN told this to Miguelina at [facility]. Her answer was, "please don't d/c the patient until Monday". She stated that maybe the patient could go to Rehab or a different SNF. Miguelina would need to get this approved by their Medical Director. ADAN  Checked the patient's therapy notes which recommend  HH. Her therapists say she is much stronger now. ADAN will follow up on this on Monday.     "

## 2022-06-11 NOTE — PLAN OF CARE
Problem: Adult Inpatient Plan of Care  Goal: Optimal Comfort and Wellbeing  Outcome: Ongoing, Progressing     Problem: Impaired Wound Healing  Goal: Optimal Wound Healing  Outcome: Ongoing, Progressing     Problem: Fall Injury Risk  Goal: Absence of Fall and Fall-Related Injury  Outcome: Ongoing, Progressing     Problem: Skin Injury Risk Increased  Goal: Skin Health and Integrity  Outcome: Ongoing, Progressing

## 2022-06-11 NOTE — SUBJECTIVE & OBJECTIVE
Interval History: passing flatus , started with small liquid output in bag, tolerating diet,improved appetite,  no nausea, vomiting.    Review of Systems   Constitutional:  Negative for chills and fever.   HENT:  Negative for trouble swallowing.    Respiratory:  Negative for cough and shortness of breath.    Cardiovascular:  Negative for chest pain and leg swelling.   Gastrointestinal:  Positive for abdominal pain. Negative for blood in stool, nausea and vomiting.   Genitourinary:  Negative for dysuria and hematuria.   Musculoskeletal:  Negative for gait problem.   Skin:  Positive for wound.   Neurological:  Positive for weakness. Negative for headaches.   Psychiatric/Behavioral:  Negative for confusion.    Objective:     Vital Signs (Most Recent):  Temp: 97.7 °F (36.5 °C) (06/11/22 1153)  Pulse: 79 (06/11/22 1400)  Resp: 18 (06/11/22 1532)  BP: (!) 96/51 (06/11/22 1153)  SpO2: 100 % (06/11/22 1153)   Vital Signs (24h Range):  Temp:  [97.5 °F (36.4 °C)-98.3 °F (36.8 °C)] 97.7 °F (36.5 °C)  Pulse:  [66-79] 79  Resp:  [14-18] 18  SpO2:  [98 %-100 %] 100 %  BP: ()/(41-51) 96/51     Weight: 50.2 kg (110 lb 11.8 oz)  Body mass index is 19.62 kg/m².    Intake/Output Summary (Last 24 hours) at 6/11/2022 1556  Last data filed at 6/11/2022 1541  Gross per 24 hour   Intake 360 ml   Output 2200 ml   Net -1840 ml        Physical Exam  Vitals reviewed.   Constitutional:       General: She is not in acute distress.     Appearance: She is well-developed. She is ill-appearing.   HENT:      Head: Normocephalic and atraumatic.   Eyes:      Extraocular Movements: Extraocular movements intact.      Pupils: Pupils are equal, round, and reactive to light.   Cardiovascular:      Rate and Rhythm: Normal rate and regular rhythm.   Pulmonary:      Effort: Pulmonary effort is normal. No respiratory distress.      Breath sounds: Normal breath sounds.   Abdominal:      General: Bowel sounds are normal. There is no distension.       Palpations: Abdomen is soft.      Tenderness: There is no abdominal tenderness.      Comments: Dressing with small amount of drainage   Musculoskeletal:         General: No swelling. Normal range of motion.      Cervical back: Normal range of motion and neck supple.   Skin:     General: Skin is warm.      Comments: Sacral wound healing, small left buttock wound dressed   Neurological:      General: No focal deficit present.      Mental Status: She is alert and oriented to person, place, and time.   Psychiatric:         Mood and Affect: Mood normal.         Behavior: Behavior normal.       Significant Labs: All pertinent labs within the past 24 hours have been reviewed.    Significant Imaging: I have reviewed all pertinent imaging results/findings within the past 24 hours.

## 2022-06-11 NOTE — PROGRESS NOTES
[facility] Medicine  Progress Note    Patient Name: Odette Hart  MRN: [id number]  Patient Class: IP- Inpatient   Admission Date: 6/8/2022  Length of Stay: 3 days  Attending Physician: Sanjuanita Briones MD  Primary Care Provider: Braxton Barragan MD        Subjective:     Principal Problem:Acute on chronic congestive heart failure        HPI:  Ms. Odette Hart is a 66 y.o. female, with PMH of HFrEF (EF 20%), CAD s/p 2x HEVER placement in 1/2022 (on Plavix), Ischemic Cardiomyopathy s/p ICD placement, RBBB, HTN, calculus cholecystitis s/p recent cholecystectomy, colovesicular fistula 2/2 complicated diverticulitis s/p cystoscopy with BL ureteral catheters, open sigmoid colon resection, appendectomy, and creation of end colostomy on 5/9/22, anemia, chronic hypoxemic respiratory failure (not on home O2), MDD, Debility, who presented to [facility] ED on 6/8/22 due to shortness of breath. She notes sudden onset a few days ago, with worsening tonight. She states this is worse with exertion. She also notes pain around her colostomy site over the past several days. She was seen in the ED due to this pain on 6/6/22 and was diagnosed with a UTI, and discharged on Macrobid. She states she had two episodes of emesis yesterday. She was evaluated in the ED with labs showing anemia with H&H of 10.9/33.1, ALP of 138, BNP of >4900. CXR showed prominent pulmonary vasculature, with interstitial and patchy alveolar infiltrates, and without pleural effusion. She was treated in the ED with 40 mg IV lasix. She was admitted to inpatient status.       Overview/Hospital Course:  Improved breathing and swelling with diuresis.Was tolerating food better.Wound care for midline open incision.      Interval History: passing flatus , started with small liquid output in bag, tolerating diet,improved appetite,  no nausea, vomiting.    Review of Systems   Constitutional:  Negative for chills and fever.   HENT:  Negative for  trouble swallowing.    Respiratory:  Negative for cough and shortness of breath.    Cardiovascular:  Negative for chest pain and leg swelling.   Gastrointestinal:  Positive for abdominal pain. Negative for blood in stool, nausea and vomiting.   Genitourinary:  Negative for dysuria and hematuria.   Musculoskeletal:  Negative for gait problem.   Skin:  Positive for wound.   Neurological:  Positive for weakness. Negative for headaches.   Psychiatric/Behavioral:  Negative for confusion.    Objective:     Vital Signs (Most Recent):  Temp: 97.7 °F (36.5 °C) (06/11/22 1153)  Pulse: 79 (06/11/22 1400)  Resp: 18 (06/11/22 1532)  BP: (!) 96/51 (06/11/22 1153)  SpO2: 100 % (06/11/22 1153)   Vital Signs (24h Range):  Temp:  [97.5 °F (36.4 °C)-98.3 °F (36.8 °C)] 97.7 °F (36.5 °C)  Pulse:  [66-79] 79  Resp:  [14-18] 18  SpO2:  [98 %-100 %] 100 %  BP: ()/(41-51) 96/51     Weight: 50.2 kg (110 lb 11.8 oz)  Body mass index is 19.62 kg/m².    Intake/Output Summary (Last 24 hours) at 6/11/2022 1556  Last data filed at 6/11/2022 1541  Gross per 24 hour   Intake 360 ml   Output 2200 ml   Net -1840 ml        Physical Exam  Vitals reviewed.   Constitutional:       General: She is not in acute distress.     Appearance: She is well-developed. She is ill-appearing.   HENT:      Head: Normocephalic and atraumatic.   Eyes:      Extraocular Movements: Extraocular movements intact.      Pupils: Pupils are equal, round, and reactive to light.   Cardiovascular:      Rate and Rhythm: Normal rate and regular rhythm.   Pulmonary:      Effort: Pulmonary effort is normal. No respiratory distress.      Breath sounds: Normal breath sounds.   Abdominal:      General: Bowel sounds are normal. There is no distension.      Palpations: Abdomen is soft.      Tenderness: There is no abdominal tenderness.      Comments: Dressing with small amount of drainage   Musculoskeletal:         General: No swelling. Normal range of motion.      Cervical back: Normal  range of motion and neck supple.   Skin:     General: Skin is warm.      Comments: Sacral wound healing, small left buttock wound dressed   Neurological:      General: No focal deficit present.      Mental Status: She is alert and oriented to person, place, and time.   Psychiatric:         Mood and Affect: Mood normal.         Behavior: Behavior normal.       Significant Labs: All pertinent labs within the past 24 hours have been reviewed.    Significant Imaging: I have reviewed all pertinent imaging results/findings within the past 24 hours.      Assessment/Plan:      * Acute on chronic congestive heart failure  - Ms. Odette Hart presents with shortness of breath  - BNP is elevated at >4900   - CXR showed prominent pulmonary vasculature, with interstitial and patchy alveolar infiltrates, and without pleural effusion  - s/p 40 mg IV lasix in ED   - last Echo was 5/24/2022  Summary  · The left ventricle is severely enlarged with eccentric hypertrophy and severely decreased systolic function.  · Severe left atrial enlargement.  · Grade III left ventricular diastolic dysfunction.  · Normal right ventricular size with normal right ventricular systolic function.  · Mild-to-moderate mitral regurgitation.  · Mild tricuspid regurgitation.  · There is mild pulmonary hypertension.  - CHF pathways initiated   - monitor during diuresis   - decreased lasix to 40 mg iv daily,  - changed to 20 mg oral daily with 12.5 mg aldactone      Superficial dehiscence of operation wound  appr sx recs  Wound care  Had leaking ostomy at home, doing good here      CAD (coronary artery disease)  - continue Plavix , asa, zetia  - 1/24/2022: [facility]: Cath: LAD: Proximal stent patent. LCX: Proximal 80%. LCX: Dominant. Moderate disease. LCX: HEVER 2.75 x 18 mm. Distal dissection. HEEVR 2.75 x 22 mm.       Debility  - fall precautions   -wants to continue hh, just got hh a day before admit  - therapy recommends hh  - will not be accepted back by  previous  company  - possible rehab, does not want to go to skilled facility    Anemia  - H&H appear to be at approximate baseline   - monitor       Acute cystitis without hematuria  - started on Macrobid on 6/6/22 during an ED visit   - Urine culture is without reported growth to date   - she had a prior VRE UTI   - will dc macrobid    Primary hypertension  - mildly hypotensive with preserved MAP at time of admission   - home meds: carvedilol 3.125 mg BID, lasix 20 mg QD, spironolactone 12.5 mg QD  - monitor     Ischemic cardiomyopathy  - history noted   - s/p ICD placement         VTE Risk Mitigation (From admission, onward)         Ordered     enoxaparin injection 40 mg  Daily         06/08/22 1122     IP VTE HIGH RISK PATIENT  Once         06/08/22 0253     Place sequential compression device  Until discontinued         06/08/22 0253     Place sequential compression device  Until discontinued         06/08/22 0252                Discharge Planning   SIVAN:      Code Status: Full Code   Is the patient medically ready for discharge?:     Reason for patient still in hospital (select all that apply): Patient trending condition and Treatment                     Sanjuanita Briones MD  Department of Hospital Medicine   [facility] ([facility])

## 2022-06-11 NOTE — ASSESSMENT & PLAN NOTE
- fall precautions   -wants to continue hh, just got hh a day before admit  - therapy recommends hh  - will not be accepted back by previous hh company  - possible rehab, does not want to go to skilled facility

## 2022-06-12 NOTE — PLAN OF CARE
VSS and afebrile, AAOx4. Dressings monitored closely. Midline incision dressing changed. Colostomy bag changed by nurse during shift. Pain controlled with PRN medications. PRN anxiety medications utilized. Plan of care reviewed with patient. Purposeful rounding done, call light at bed side, bed at lowest position, brakes on, non-skid socks on, will continue to monitor.     0450- Patient ambulated x2 laps in hallway using walker. Standby assistance provided.    Problem: Adult Inpatient Plan of Care  Goal: Optimal Comfort and Wellbeing  Outcome: Ongoing, Progressing     Problem: Impaired Wound Healing  Goal: Optimal Wound Healing  Outcome: Ongoing, Progressing     Problem: Fall Injury Risk  Goal: Absence of Fall and Fall-Related Injury  Outcome: Ongoing, Progressing     Problem: Skin Injury Risk Increased  Goal: Skin Health and Integrity  Outcome: Ongoing, Progressing

## 2022-06-12 NOTE — SUBJECTIVE & OBJECTIVE
Interval History: had hard stool in bag last night, ,c/o abdominal pain,  tolerating diet,improved appetite,  no nausea, vomiting.    Review of Systems   Constitutional:  Negative for chills and fever.   HENT:  Negative for trouble swallowing.    Respiratory:  Negative for cough and shortness of breath.    Cardiovascular:  Negative for chest pain and leg swelling.   Gastrointestinal:  Positive for abdominal pain. Negative for blood in stool, nausea and vomiting.   Genitourinary:  Negative for dysuria and hematuria.   Musculoskeletal:  Negative for gait problem.   Skin:  Positive for wound.   Neurological:  Positive for weakness. Negative for headaches.   Psychiatric/Behavioral:  Negative for confusion.    Objective:     Vital Signs (Most Recent):  Temp: 97.3 °F (36.3 °C) (06/12/22 1201)  Pulse: 79 (06/12/22 1201)  Resp: 20 (06/12/22 1320)  BP: (!) 103/55 (06/12/22 1201)  SpO2: 98 % (06/12/22 1201)   Vital Signs (24h Range):  Temp:  [97.3 °F (36.3 °C)-98.2 °F (36.8 °C)] 97.3 °F (36.3 °C)  Pulse:  [63-86] 79  Resp:  [14-20] 20  SpO2:  [97 %-100 %] 98 %  BP: ()/(49-58) 103/55     Weight: 50 kg (110 lb 3.7 oz)  Body mass index is 19.53 kg/m².    Intake/Output Summary (Last 24 hours) at 6/12/2022 1351  Last data filed at 6/12/2022 1200  Gross per 24 hour   Intake 240 ml   Output 1250 ml   Net -1010 ml        Physical Exam  Vitals reviewed.   Constitutional:       General: She is not in acute distress.     Appearance: She is well-developed. She is ill-appearing.   HENT:      Head: Normocephalic and atraumatic.   Eyes:      Extraocular Movements: Extraocular movements intact.      Pupils: Pupils are equal, round, and reactive to light.   Cardiovascular:      Rate and Rhythm: Normal rate and regular rhythm.   Pulmonary:      Effort: Pulmonary effort is normal. No respiratory distress.      Breath sounds: Normal breath sounds.   Abdominal:      General: Bowel sounds are normal. There is no distension.      Palpations:  Abdomen is soft.      Tenderness: There is no abdominal tenderness.      Comments: Dressing with drainage from open wound  on top of incision with silver seen, mild redness around the wound, bigger than on admit.   Musculoskeletal:         General: No swelling. Normal range of motion.      Cervical back: Normal range of motion and neck supple.   Skin:     General: Skin is warm.      Comments: Sacral wound healing, small left buttock wound dressed   Neurological:      General: No focal deficit present.      Mental Status: She is alert and oriented to person, place, and time.   Psychiatric:         Mood and Affect: Mood normal.         Behavior: Behavior normal.       Significant Labs: All pertinent labs within the past 24 hours have been reviewed.    Significant Imaging: I have reviewed all pertinent imaging results/findings within the past 24 hours.

## 2022-06-12 NOTE — ASSESSMENT & PLAN NOTE
- fall precautions   -wants to continue hh, just got hh a day before admit  - therapy recommends hh  - will not be accepted back by previous hh company  - possible rehab, does not want to go to skilled facility, but willing to go to [facility] skilled facility  - will need wound care

## 2022-06-12 NOTE — PLAN OF CARE
"Met with patient at bedside to discuss discharge dispo - patient concerned after being told HH refusing to resume care - states "I need some big time help with this wound" - patient interested in SNF placement but only at [facility] recalling a bad experience at previous SNF - explained PHN would need to authorize SNF level of care & [facility] would have to agree to accept - choice form signed - referral forwarded to [facility] SN[facility] via [facility] - packet faxed to PHN for review   "

## 2022-06-12 NOTE — PROGRESS NOTES
[facility] Medicine  Progress Note    Patient Name: Odette Hart  MRN: [id number]  Patient Class: IP- Inpatient   Admission Date: 6/8/2022  Length of Stay: 4 days  Attending Physician: Sanjuanita Briones MD  Primary Care Provider: Braxton Barragan MD        Subjective:     Principal Problem:Acute on chronic congestive heart failure        HPI:  Ms. Odette Hart is a 66 y.o. female, with PMH of HFrEF (EF 20%), CAD s/p 2x HEVER placement in 1/2022 (on Plavix), Ischemic Cardiomyopathy s/p ICD placement, RBBB, HTN, calculus cholecystitis s/p recent cholecystectomy, colovesicular fistula 2/2 complicated diverticulitis s/p cystoscopy with BL ureteral catheters, open sigmoid colon resection, appendectomy, and creation of end colostomy on 5/9/22, anemia, chronic hypoxemic respiratory failure (not on home O2), MDD, Debility, who presented to [facility] ED on 6/8/22 due to shortness of breath. She notes sudden onset a few days ago, with worsening tonight. She states this is worse with exertion. She also notes pain around her colostomy site over the past several days. She was seen in the ED due to this pain on 6/6/22 and was diagnosed with a UTI, and discharged on Macrobid. She states she had two episodes of emesis yesterday. She was evaluated in the ED with labs showing anemia with H&H of 10.9/33.1, ALP of 138, BNP of >4900. CXR showed prominent pulmonary vasculature, with interstitial and patchy alveolar infiltrates, and without pleural effusion. She was treated in the ED with 40 mg IV lasix. She was admitted to inpatient status.       Overview/Hospital Course:  Improved breathing and swelling with diuresis.Was tolerating food better.Wound care for midline open incision.      Interval History: had hard stool in bag last night, ,c/o abdominal pain,  tolerating diet,improved appetite,  no nausea, vomiting.    Review of Systems   Constitutional:  Negative for chills and fever.   HENT:  Negative for  trouble swallowing.    Respiratory:  Negative for cough and shortness of breath.    Cardiovascular:  Negative for chest pain and leg swelling.   Gastrointestinal:  Positive for abdominal pain. Negative for blood in stool, nausea and vomiting.   Genitourinary:  Negative for dysuria and hematuria.   Musculoskeletal:  Negative for gait problem.   Skin:  Positive for wound.   Neurological:  Positive for weakness. Negative for headaches.   Psychiatric/Behavioral:  Negative for confusion.    Objective:     Vital Signs (Most Recent):  Temp: 97.3 °F (36.3 °C) (06/12/22 1201)  Pulse: 79 (06/12/22 1201)  Resp: 20 (06/12/22 1320)  BP: (!) 103/55 (06/12/22 1201)  SpO2: 98 % (06/12/22 1201)   Vital Signs (24h Range):  Temp:  [97.3 °F (36.3 °C)-98.2 °F (36.8 °C)] 97.3 °F (36.3 °C)  Pulse:  [63-86] 79  Resp:  [14-20] 20  SpO2:  [97 %-100 %] 98 %  BP: ()/(49-58) 103/55     Weight: 50 kg (110 lb 3.7 oz)  Body mass index is 19.53 kg/m².    Intake/Output Summary (Last 24 hours) at 6/12/2022 1351  Last data filed at 6/12/2022 1200  Gross per 24 hour   Intake 240 ml   Output 1250 ml   Net -1010 ml        Physical Exam  Vitals reviewed.   Constitutional:       General: She is not in acute distress.     Appearance: She is well-developed. She is ill-appearing.   HENT:      Head: Normocephalic and atraumatic.   Eyes:      Extraocular Movements: Extraocular movements intact.      Pupils: Pupils are equal, round, and reactive to light.   Cardiovascular:      Rate and Rhythm: Normal rate and regular rhythm.   Pulmonary:      Effort: Pulmonary effort is normal. No respiratory distress.      Breath sounds: Normal breath sounds.   Abdominal:      General: Bowel sounds are normal. There is no distension.      Palpations: Abdomen is soft.      Tenderness: There is no abdominal tenderness.      Comments: Dressing with drainage from open wound  on top of incision with silver seen, mild redness around the wound, bigger than on admit.    Musculoskeletal:         General: No swelling. Normal range of motion.      Cervical back: Normal range of motion and neck supple.   Skin:     General: Skin is warm.      Comments: Sacral wound healing, small left buttock wound dressed   Neurological:      General: No focal deficit present.      Mental Status: She is alert and oriented to person, place, and time.   Psychiatric:         Mood and Affect: Mood normal.         Behavior: Behavior normal.       Significant Labs: All pertinent labs within the past 24 hours have been reviewed.    Significant Imaging: I have reviewed all pertinent imaging results/findings within the past 24 hours.      Assessment/Plan:      * Acute on chronic congestive heart failure  - Ms. Odette Hart presents with shortness of breath  - BNP is elevated at >4900   - CXR showed prominent pulmonary vasculature, with interstitial and patchy alveolar infiltrates, and without pleural effusion  - s/p 40 mg IV lasix in ED   - last Echo was 5/24/2022  Summary  · The left ventricle is severely enlarged with eccentric hypertrophy and severely decreased systolic function.  · Severe left atrial enlargement.  · Grade III left ventricular diastolic dysfunction.  · Normal right ventricular size with normal right ventricular systolic function.  · Mild-to-moderate mitral regurgitation.  · Mild tricuspid regurgitation.  · There is mild pulmonary hypertension.  - CHF pathways initiated   - monitor during diuresis   - decreased lasix to 40 mg iv daily,  - changed to 20 mg oral daily with 12.5 mg aldactone      Superficial dehiscence of operation wound  appr sx recs  Wound care  Had leaking ostomy at home, doing good here  Recent ct abdomen 6/6 showed post operative changes without a large focal fluid collection.      CAD (coronary artery disease)  - continue Plavix , asa, zetia  - 1/24/2022: [facility]: Cath: LAD: Proximal stent patent. LCX: Proximal 80%. LCX: Dominant. Moderate disease. LCX: HEVER 2.75 x 18  mm. Distal dissection. HEVER 2.75 x 22 mm.       Debility  - fall precautions   -wants to continue hh, just got hh a day before admit  - therapy recommends hh  - will not be accepted back by previous hh company  - possible rehab, does not want to go to skilled facility, but willing to go to [facility] skilled facility  - will need wound care    Anemia  - H&H appear to be at approximate baseline   - monitor       Acute cystitis without hematuria  - started on Macrobid on 6/6/22 during an ED visit   - Urine culture is without reported growth to date   - she had a prior VRE UTI   - will dc macrobid    Primary hypertension  - mildly hypotensive with preserved MAP at time of admission   - home meds: carvedilol 3.125 mg BID, lasix 20 mg QD, spironolactone 12.5 mg QD  - monitor     Ischemic cardiomyopathy  - history noted   - s/p ICD placement         VTE Risk Mitigation (From admission, onward)         Ordered     enoxaparin injection 40 mg  Daily         06/08/22 1122     IP VTE HIGH RISK PATIENT  Once         06/08/22 0253     Place sequential compression device  Until discontinued         06/08/22 0253     Place sequential compression device  Until discontinued         06/08/22 0252                Discharge Planning   SIVAN:      Code Status: Full Code   Is the patient medically ready for discharge?:     Reason for patient still in hospital (select all that apply): Patient trending condition and Treatment                     Sanjuanita Briones MD  Department of Hospital Medicine   [facility] - Med Surg ([facility])

## 2022-06-12 NOTE — PLAN OF CARE
Patient A&Ox4. Pain managed with PRN. Wound care completed tolerated well. Wound with odor and increased redness Dr. Briones notified no new orders. Patient ambulated in [address]. Purposeful hourly rounding completed. Call light within reach. Side railsX2.       Problem: Adult Inpatient Plan of Care  Goal: Plan of Care Review  Outcome: Ongoing, Progressing  Goal: Patient-Specific Goal (Individualized)  Outcome: Ongoing, Progressing  Goal: Optimal Comfort and Wellbeing  Outcome: Ongoing, Progressing     Problem: Impaired Wound Healing  Goal: Optimal Wound Healing  Outcome: Ongoing, Progressing

## 2022-06-13 PROBLEM — N30.00 ACUTE CYSTITIS WITHOUT HEMATURIA: Status: RESOLVED | Noted: 2022-04-26 | Resolved: 2022-01-01

## 2022-06-13 NOTE — PROGRESS NOTES
ADAN received a call from Miguelina Domínguez at [facility], who said the patient was approved for SNF. Dr. Briones spoke to ADAN stating that she was too high level for inpatient rehab., but needed wound care which could be provided at a SNF. The patient prefers [facility]. ADAN called Jen at [facility]. She said that there are no open beds and wouldn't be one until Thursday or Friday of this week. Also, there many referral to [facility] at this time and no guarantee that Ms. Chan will get a bed. ADAN will discuss other options with her.

## 2022-06-13 NOTE — SUBJECTIVE & OBJECTIVE
Interval History: improved stool output in bag,off and on  abdominal pain,  tolerating diet,improved appetite,  no nausea, vomiting.    Review of Systems   Constitutional:  Negative for chills and fever.   HENT:  Negative for trouble swallowing.    Respiratory:  Negative for cough and shortness of breath.    Cardiovascular:  Negative for chest pain and leg swelling.   Gastrointestinal:  Positive for abdominal pain. Negative for blood in stool, nausea and vomiting.   Genitourinary:  Negative for dysuria and hematuria.   Musculoskeletal:  Negative for gait problem.   Skin:  Positive for wound.   Neurological:  Positive for weakness. Negative for headaches.   Psychiatric/Behavioral:  Negative for confusion.    Objective:     Vital Signs (Most Recent):  Temp: 97.9 °F (36.6 °C) (06/13/22 1209)  Pulse: 72 (06/13/22 1209)  Resp: 18 (06/13/22 1209)  BP: (!) 91/53 (06/13/22 1209)  SpO2: 99 % (06/13/22 1209)   Vital Signs (24h Range):  Temp:  [97.3 °F (36.3 °C)-98.1 °F (36.7 °C)] 97.9 °F (36.6 °C)  Pulse:  [70-74] 72  Resp:  [17-20] 18  SpO2:  [96 %-100 %] 99 %  BP: ()/(51-60) 91/53     Weight: 50.2 kg (110 lb 11.8 oz)  Body mass index is 19.62 kg/m².    Intake/Output Summary (Last 24 hours) at 6/13/2022 1413  Last data filed at 6/13/2022 0745  Gross per 24 hour   Intake 0 ml   Output 500 ml   Net -500 ml        Physical Exam  Vitals reviewed.   Constitutional:       General: She is not in acute distress.     Appearance: She is well-developed. She is ill-appearing.   HENT:      Head: Normocephalic and atraumatic.   Eyes:      Extraocular Movements: Extraocular movements intact.      Pupils: Pupils are equal, round, and reactive to light.   Cardiovascular:      Rate and Rhythm: Normal rate and regular rhythm.   Pulmonary:      Effort: Pulmonary effort is normal. No respiratory distress.      Breath sounds: Normal breath sounds.   Abdominal:      General: Bowel sounds are normal. There is no distension.      Palpations:  Abdomen is soft.      Tenderness: There is no abdominal tenderness.      Comments: Dressing with drainage from open wound  on top of incision with silver seen, mild redness around the wound, bigger than on admit.   Musculoskeletal:         General: No swelling. Normal range of motion.      Cervical back: Normal range of motion and neck supple.   Skin:     General: Skin is warm.      Comments: Sacral wound healing, small left buttock wound dressed   Neurological:      General: No focal deficit present.      Mental Status: She is alert and oriented to person, place, and time.   Psychiatric:         Mood and Affect: Mood normal.         Behavior: Behavior normal.       Significant Labs: All pertinent labs within the past 24 hours have been reviewed.    Significant Imaging: I have reviewed all pertinent imaging results/findings within the past 24 hours.

## 2022-06-13 NOTE — ASSESSMENT & PLAN NOTE
- fall precautions   -wants to continue hh, just got hh a day before admit  - therapy recommends hh  - will not be accepted back by previous hh company  -  willing to go to [facility], referral sent  - will need wound care

## 2022-06-13 NOTE — PROGRESS NOTES
[facility] Surg [facility] Medicine  Progress Note    Patient Name: Odette Hart  MRN: [id number]  Patient Class: IP- Inpatient   Admission Date: 6/8/2022  Length of Stay: 5 days  Attending Physician: Sanjuanita Briones MD  Primary Care Provider: Braxton Barragan MD        Subjective:     Principal Problem:Acute on chronic congestive heart failure        HPI:  Ms. Odette Hart is a 66 y.o. female, with PMH of HFrEF (EF 20%), CAD s/p 2x HEVER placement in 1/2022 (on Plavix), Ischemic Cardiomyopathy s/p ICD placement, RBBB, HTN, calculus cholecystitis s/p recent cholecystectomy, colovesicular fistula 2/2 complicated diverticulitis s/p cystoscopy with BL ureteral catheters, open sigmoid colon resection, appendectomy, and creation of end colostomy on 5/9/22, anemia, chronic hypoxemic respiratory failure (not on home O2), MDD, Debility, who presented to [facility] ED on 6/8/22 due to shortness of breath. She notes sudden onset a few days ago, with worsening tonight. She states this is worse with exertion. She also notes pain around her colostomy site over the past several days. She was seen in the ED due to this pain on 6/6/22 and was diagnosed with a UTI, and discharged on Macrobid. She states she had two episodes of emesis yesterday. She was evaluated in the ED with labs showing anemia with H&H of 10.9/33.1, ALP of 138, BNP of >4900. CXR showed prominent pulmonary vasculature, with interstitial and patchy alveolar infiltrates, and without pleural effusion. She was treated in the ED with 40 mg IV lasix. She was admitted to inpatient status.       Overview/Hospital Course:  Improved breathing and swelling with diuresis.Was tolerating food better.Wound care for midline open incision.      Interval History: improved stool output in bag,off and on  abdominal pain,  tolerating diet,improved appetite,  no nausea, vomiting.    Review of Systems   Constitutional:  Negative for chills and fever.   HENT:  Negative for  trouble swallowing.    Respiratory:  Negative for cough and shortness of breath.    Cardiovascular:  Negative for chest pain and leg swelling.   Gastrointestinal:  Positive for abdominal pain. Negative for blood in stool, nausea and vomiting.   Genitourinary:  Negative for dysuria and hematuria.   Musculoskeletal:  Negative for gait problem.   Skin:  Positive for wound.   Neurological:  Positive for weakness. Negative for headaches.   Psychiatric/Behavioral:  Negative for confusion.    Objective:     Vital Signs (Most Recent):  Temp: 97.9 °F (36.6 °C) (06/13/22 1209)  Pulse: 72 (06/13/22 1209)  Resp: 18 (06/13/22 1209)  BP: (!) 91/53 (06/13/22 1209)  SpO2: 99 % (06/13/22 1209)   Vital Signs (24h Range):  Temp:  [97.3 °F (36.3 °C)-98.1 °F (36.7 °C)] 97.9 °F (36.6 °C)  Pulse:  [70-74] 72  Resp:  [17-20] 18  SpO2:  [96 %-100 %] 99 %  BP: ()/(51-60) 91/53     Weight: 50.2 kg (110 lb 11.8 oz)  Body mass index is 19.62 kg/m².    Intake/Output Summary (Last 24 hours) at 6/13/2022 1413  Last data filed at 6/13/2022 0745  Gross per 24 hour   Intake 0 ml   Output 500 ml   Net -500 ml        Physical Exam  Vitals reviewed.   Constitutional:       General: She is not in acute distress.     Appearance: She is well-developed. She is ill-appearing.   HENT:      Head: Normocephalic and atraumatic.   Eyes:      Extraocular Movements: Extraocular movements intact.      Pupils: Pupils are equal, round, and reactive to light.   Cardiovascular:      Rate and Rhythm: Normal rate and regular rhythm.   Pulmonary:      Effort: Pulmonary effort is normal. No respiratory distress.      Breath sounds: Normal breath sounds.   Abdominal:      General: Bowel sounds are normal. There is no distension.      Palpations: Abdomen is soft.      Tenderness: There is no abdominal tenderness.      Comments: Dressing with drainage from open wound  on top of incision with silver seen, mild redness around the wound, bigger than on admit.    Musculoskeletal:         General: No swelling. Normal range of motion.      Cervical back: Normal range of motion and neck supple.   Skin:     General: Skin is warm.      Comments: Sacral wound healing, small left buttock wound dressed   Neurological:      General: No focal deficit present.      Mental Status: She is alert and oriented to person, place, and time.   Psychiatric:         Mood and Affect: Mood normal.         Behavior: Behavior normal.       Significant Labs: All pertinent labs within the past 24 hours have been reviewed.    Significant Imaging: I have reviewed all pertinent imaging results/findings within the past 24 hours.      Assessment/Plan:      * Acute on chronic congestive heart failure  - Ms. Odette Hart presents with shortness of breath  - BNP is elevated at >4900   - CXR showed prominent pulmonary vasculature, with interstitial and patchy alveolar infiltrates, and without pleural effusion  - s/p 40 mg IV lasix in ED   - last Echo was 5/24/2022  Summary  · The left ventricle is severely enlarged with eccentric hypertrophy and severely decreased systolic function.  · Severe left atrial enlargement.  · Grade III left ventricular diastolic dysfunction.  · Normal right ventricular size with normal right ventricular systolic function.  · Mild-to-moderate mitral regurgitation.  · Mild tricuspid regurgitation.  · There is mild pulmonary hypertension.  - CHF pathways initiated   - monitor during diuresis   - decreased lasix to 40 mg iv daily,  - changed to 20 mg oral daily with 12.5 mg aldactone      Superficial dehiscence of operation wound  appr sx recs  Wound care  Had leaking ostomy at home, doing good here  Recent ct abdomen 6/6 showed post operative changes without a large focal fluid collection.      CAD (coronary artery disease)  - continue Plavix , asa, zetia  - 1/24/2022: [facility]: Cath: LAD: Proximal stent patent. LCX: Proximal 80%. LCX: Dominant. Moderate disease. LCX: HEVER 2.75 x 18  mm. Distal dissection. HEVER 2.75 x 22 mm.       Debility  - fall precautions   -wants to continue hh, just got hh a day before admit  - therapy recommends hh  - will not be accepted back by previous  company  -  willing to go to [facility], referral sent  - will need wound care    Anemia  - H&H appear to be at approximate baseline   - monitor       Primary hypertension  - mildly hypotensive with preserved MAP at time of admission   - home meds: carvedilol 3.125 mg BID, lasix 20 mg QD, spironolactone 12.5 mg QD  - monitor     Ischemic cardiomyopathy  - history noted   - s/p ICD placement         VTE Risk Mitigation (From admission, onward)         Ordered     enoxaparin injection 40 mg  Daily         06/08/22 1122     IP VTE HIGH RISK PATIENT  Once         06/08/22 0253     Place sequential compression device  Until discontinued         06/08/22 0253     Place sequential compression device  Until discontinued         06/08/22 0252                Discharge Planning   SIVAN:      Code Status: Full Code   Is the patient medically ready for discharge?:     Reason for patient still in hospital (select all that apply):   pending disposition                 Sanjuanita Briones MD  Department of Hospital Medicine   [facility] Med Surg ([facility])

## 2022-06-13 NOTE — ASSESSMENT & PLAN NOTE
How Severe Is Your Skin Lesion?: moderate appr sx recs  Wound care  Had leaking ostomy at home, doing good here  Recent ct abdomen 6/6 showed post operative changes without a large focal fluid collection.     Have Your Skin Lesions Been Treated?: not been treated Is This A New Presentation, Or A Follow-Up?: Skin Lesions Which Family Member (Optional)?: Father bcc

## 2022-06-13 NOTE — PLAN OF CARE
This CM Director spoke with Jen with [facility]. Per huddle, the patient only wanted to go to [facility].  Message sent to Jen regarding medical appropriateness for short stay snf at [facility]. Awaiting feedback.

## 2022-06-14 NOTE — PROGRESS NOTES
[facility] Surg [facility])  Wound Care    Patient Name:  Odette Hart   MRN:  [id number]  Date: 2022  Diagnosis: Acute on chronic congestive heart failure    History:     Past Medical History:   Diagnosis Date    Acute coronary syndrome     Acute exacerbation of chronic obstructive pulmonary disease (COPD) 3/23/2022    Allergy     Arthritis     Cardiomyopathy     CHF (congestive heart failure)     Coronary artery disease     Coronary artery disease of native artery of native heart with stable angina pectoris 2021: [facility]: Cath: LAD: Proximal stent patent. LCX: Proximal 80%. LCX: Dominant. Moderate disease. LCX: HEVER 2.75 x 18 mm. Distal dissection. HEVER 2.75 x 22 mm.     Diverticulitis     Diverticulosis     Familial hypercholesterolemia 2022    Former smoker 2022    Heart attack     Heart disease     History of myocardial infarction 2022    Hyperlipidemia     Hypertension     Hypertension 2022    ICD (implantable cardioverter-defibrillator) in place     Non-ST elevation myocardial infarction (NSTEMI) 2019       Social History     Socioeconomic History    Marital status:    Tobacco Use    Smoking status: Former Smoker     Packs/day: 0.50     Start date: 1976     Quit date: 2012     Years since quittin.5    Smokeless tobacco: Never Used   Substance and Sexual Activity    Alcohol use: No    Drug use: No     Social Determinants of Health     Financial Resource Strain: Low Risk     Difficulty of Paying Living Expenses: Not hard at all   Food Insecurity: No Food Insecurity    Worried About Running Out of Food in the Last Year: Never true    Ran Out of Food in the Last Year: Never true   Transportation Needs: No Transportation Needs    Lack of Transportation (Medical): No    Lack of Transportation (Non-Medical): No   Physical Activity: Inactive    Days of Exercise per Week: 0 days    Minutes of Exercise per Session: 0  min   Stress: No Stress Concern Present    Feeling of Stress : Not at all   Social Connections: Socially Isolated    Frequency of Communication with Friends and Family: More than three times a week    Frequency of Social Gatherings with Friends and Family: Once a week    Attends Catholic Services: Never    Active Member of Clubs or Organizations: No    Attends Club or Organization Meetings: Never    Marital Status:    Housing Stability: Low Risk     Unable to Pay for Housing in the Last Year: No    Number of Places Lived in the Last Year: 1    Unstable Housing in the Last Year: No       Precautions:     Allergies as of 06/08/2022 - Reviewed 06/08/2022   Allergen Reaction Noted    Augmentin [amoxicillin-pot clavulanate] Swelling 02/19/2018    Lisinopril Other (See Comments) 02/19/2018    Losartan Other (See Comments) 02/19/2018    Shellfish containing products Anaphylaxis 09/10/2018    Levaquin [levofloxacin] Other (See Comments) 09/18/2018    Clindamycin Palpitations 01/21/2019       [facility] Assessment Details/Treatment     Patient requesting a visit from the ostomy nurse  Pouch is intact , but changed today at her request. Noted a chain of yellow pustules forming at the edge of the appliance from 10-12 o'clock and  4-6 o'clock.         Noted another one along the incision line just superior to the umbilicus dehiscence. Secure chat to Dr Roy and Dr Jacobsen to relate findings of pustules.    Umbilicus wound larger and moderate exudate noted during dressing change. Base is white with suture visible.       Sacral wound improved with decreased slough in base and increased granulation tissue.   Continuing present treatment.              06/14/2022

## 2022-06-14 NOTE — PT/OT/SLP PROGRESS
Physical Therapy      Patient Name:  Odette Hart   MRN:  [id number]    Patient not seen today secondary to pt reports just returning from ambulating in hallway w/ rollator. Will follow-up in PM.

## 2022-06-14 NOTE — ASSESSMENT & PLAN NOTE
appr sx recs  Wound care  Had leaking ostomy at home, doing good here  Recent ct abdomen 6/6 showed post operative changes without a large focal fluid collection.

## 2022-06-14 NOTE — PROGRESS NOTES
[facility] Surg [facility] Medicine  Progress Note    Patient Name: Odette Hart  MRN: [id number]  Patient Class: IP- Inpatient   Admission Date: 6/8/2022  Length of Stay: 6 days  Attending Physician: Zaire Roy MD  Primary Care Provider: Braxton Barragan MD        Subjective:     Principal Problem:Acute on chronic congestive heart failure        HPI:  Ms. Odette Hart is a 66 y.o. female, with PMH of HFrEF (EF 20%), CAD s/p 2x HEVER placement in 1/2022 (on Plavix), Ischemic Cardiomyopathy s/p ICD placement, RBBB, HTN, calculus cholecystitis s/p recent cholecystectomy, colovesicular fistula 2/2 complicated diverticulitis s/p cystoscopy with BL ureteral catheters, open sigmoid colon resection, appendectomy, and creation of end colostomy on 5/9/22, anemia, chronic hypoxemic respiratory failure (not on home O2), MDD, Debility, who presented to [facility] ED on 6/8/22 due to shortness of breath. She notes sudden onset a few days ago, with worsening tonight. She states this is worse with exertion. She also notes pain around her colostomy site over the past several days. She was seen in the ED due to this pain on 6/6/22 and was diagnosed with a UTI, and discharged on Macrobid. She states she had two episodes of emesis yesterday. She was evaluated in the ED with labs showing anemia with H&H of 10.9/33.1, ALP of 138, BNP of >4900. CXR showed prominent pulmonary vasculature, with interstitial and patchy alveolar infiltrates, and without pleural effusion. She was treated in the ED with 40 mg IV lasix. She was admitted to inpatient status.       Overview/Hospital Course:  Improved breathing and swelling with diuresis.Was tolerating food better.Wound care for midline open incision.      Interval History:  continues to report on/off bdominal pain,  tolerating diet,improved appetite,  no nausea, vomiting.  + colostomy output    Denies SOB. On no supplemental oxygen    Looking for SNF placement      Review of  Systems   Constitutional:  Negative for chills and fever.   HENT:  Negative for trouble swallowing.    Respiratory:  Negative for cough and shortness of breath.    Cardiovascular:  Negative for chest pain and leg swelling.   Gastrointestinal:  Positive for abdominal pain. Negative for blood in stool, nausea and vomiting.   Genitourinary:  Negative for dysuria and hematuria.   Musculoskeletal:  Negative for gait problem.   Skin:  Positive for wound.   Neurological:  Positive for weakness. Negative for headaches.   Psychiatric/Behavioral:  Negative for confusion.    Objective:     Vital Signs (Most Recent):  Temp: 97.8 °F (36.6 °C) (06/14/22 0904)  Pulse: 70 (06/14/22 0904)  Resp: 17 (06/14/22 0904)  BP: (!) 104/49 (06/14/22 0904)  SpO2: 98 % (06/14/22 0904)   Vital Signs (24h Range):  Temp:  [97.8 °F (36.6 °C)-98.9 °F (37.2 °C)] 97.8 °F (36.6 °C)  Pulse:  [70-87] 70  Resp:  [17-18] 17  SpO2:  [94 %-100 %] 98 %  BP: ()/(49-55) 104/49     Weight: 50.1 kg (110 lb 7.9 oz)  Body mass index is 19.57 kg/m².    Intake/Output Summary (Last 24 hours) at 6/14/2022 1047  Last data filed at 6/13/2022 1937  Gross per 24 hour   Intake --   Output 100 ml   Net -100 ml        Physical Exam  Vitals reviewed.   Constitutional:       General: She is not in acute distress.     Appearance: She is well-developed. She is ill-appearing.   HENT:      Head: Normocephalic and atraumatic.   Eyes:      Extraocular Movements: Extraocular movements intact.      Pupils: Pupils are equal, round, and reactive to light.   Cardiovascular:      Rate and Rhythm: Normal rate and regular rhythm.   Pulmonary:      Effort: Pulmonary effort is normal. No respiratory distress.      Breath sounds: Normal breath sounds.   Abdominal:      General: Bowel sounds are normal. There is no distension.      Palpations: Abdomen is soft.      Tenderness: There is no abdominal tenderness.      Comments: Dressing with drainage from open wound  on top of incision with  silver seen, mild redness around the wound, bigger than on admit.   Musculoskeletal:         General: No swelling. Normal range of motion.      Cervical back: Normal range of motion and neck supple.   Skin:     General: Skin is warm.      Comments: Sacral wound healing, small left buttock wound dressed   Neurological:      General: No focal deficit present.      Mental Status: She is alert and oriented to person, place, and time.   Psychiatric:         Mood and Affect: Mood normal.         Behavior: Behavior normal.       Significant Labs: All pertinent labs within the past 24 hours have been reviewed.    Significant Imaging: I have reviewed all pertinent imaging results/findings within the past 24 hours.      Assessment/Plan:      * Acute on chronic congestive heart failure  Acute on chronic combines systolic and diastolic HF   Echo  5/24/2022  Summary  · The left ventricle is severely enlarged with eccentric hypertrophy and severely decreased systolic function.  · Severe left atrial enlargement.  · Grade III left ventricular diastolic dysfunction.  · Normal right ventricular size with normal right ventricular systolic function.  · Mild-to-moderate mitral regurgitation.  · Mild tricuspid regurgitation.  · There is mild pulmonary hypertension.  - CHF pathways initiated   - monitor during diuresis     -  20 mg lasix oral daily with 12.5 mg aldactone    - clinically euvolemic      Superficial dehiscence of operation wound  appr sx recs  Wound care  Had leaking ostomy at home, doing good here  Recent ct abdomen 6/6 showed post operative changes without a large focal fluid collection.      CAD (coronary artery disease)  - continue Plavix , asa, zetia  - 1/24/2022: [facility]: Cath: LAD: Proximal stent patent. LCX: Proximal 80%. LCX: Dominant. Moderate disease. LCX: HEVER 2.75 x 18 mm. Distal dissection. HEVER 2.75 x 22 mm.       Debility  - fall precautions   - SNF vs HH    Anemia  - H&H appear to be at approximate baseline   -  monitor       Primary hypertension  - mildly hypotensive with preserved MAP at time of admission   - home meds: carvedilol 3.125 mg BID, lasix 20 mg QD, spironolactone 12.5 mg QD  - monitor     Ischemic cardiomyopathy  - history noted   - s/p ICD placement         VTE Risk Mitigation (From admission, onward)         Ordered     enoxaparin injection 40 mg  Daily         06/08/22 1122     IP VTE HIGH RISK PATIENT  Once         06/08/22 0253     Place sequential compression device  Until discontinued         06/08/22 0253     Place sequential compression device  Until discontinued         06/08/22 0252                Discharge Planning   SIVAN:      Code Status: Full Code   Is the patient medically ready for discharge?:     Reason for patient still in hospital (select all that apply): Pending disposition           Zaire Roy MD  Department of Hospital Medicine   [facility] Med Surg ([facility]

## 2022-06-14 NOTE — PT/OT/SLP PROGRESS
Occupational Therapy      Patient Name:  Odette Hart   MRN:  [id number]    Patient not seen today secondary to  (per nurse, pt is sleeping and asking OT to let her rest.). Will follow-up on 6/15/22.    6/14/2022

## 2022-06-14 NOTE — SUBJECTIVE & OBJECTIVE
Interval History:  continues to report on/off bdominal pain,  tolerating diet,improved appetite,  no nausea, vomiting.  + colostomy output    Denies SOB. On no supplemental oxygen    Looking for SNF placement      Review of Systems   Constitutional:  Negative for chills and fever.   HENT:  Negative for trouble swallowing.    Respiratory:  Negative for cough and shortness of breath.    Cardiovascular:  Negative for chest pain and leg swelling.   Gastrointestinal:  Positive for abdominal pain. Negative for blood in stool, nausea and vomiting.   Genitourinary:  Negative for dysuria and hematuria.   Musculoskeletal:  Negative for gait problem.   Skin:  Positive for wound.   Neurological:  Positive for weakness. Negative for headaches.   Psychiatric/Behavioral:  Negative for confusion.    Objective:     Vital Signs (Most Recent):  Temp: 97.8 °F (36.6 °C) (06/14/22 0904)  Pulse: 70 (06/14/22 0904)  Resp: 17 (06/14/22 0904)  BP: (!) 104/49 (06/14/22 0904)  SpO2: 98 % (06/14/22 0904)   Vital Signs (24h Range):  Temp:  [97.8 °F (36.6 °C)-98.9 °F (37.2 °C)] 97.8 °F (36.6 °C)  Pulse:  [70-87] 70  Resp:  [17-18] 17  SpO2:  [94 %-100 %] 98 %  BP: ()/(49-55) 104/49     Weight: 50.1 kg (110 lb 7.9 oz)  Body mass index is 19.57 kg/m².    Intake/Output Summary (Last 24 hours) at 6/14/2022 1047  Last data filed at 6/13/2022 1937  Gross per 24 hour   Intake --   Output 100 ml   Net -100 ml        Physical Exam  Vitals reviewed.   Constitutional:       General: She is not in acute distress.     Appearance: She is well-developed. She is ill-appearing.   HENT:      Head: Normocephalic and atraumatic.   Eyes:      Extraocular Movements: Extraocular movements intact.      Pupils: Pupils are equal, round, and reactive to light.   Cardiovascular:      Rate and Rhythm: Normal rate and regular rhythm.   Pulmonary:      Effort: Pulmonary effort is normal. No respiratory distress.      Breath sounds: Normal breath sounds.   Abdominal:       General: Bowel sounds are normal. There is no distension.      Palpations: Abdomen is soft.      Tenderness: There is no abdominal tenderness.      Comments: Dressing with drainage from open wound  on top of incision with silver seen, mild redness around the wound, bigger than on admit.   Musculoskeletal:         General: No swelling. Normal range of motion.      Cervical back: Normal range of motion and neck supple.   Skin:     General: Skin is warm.      Comments: Sacral wound healing, small left buttock wound dressed   Neurological:      General: No focal deficit present.      Mental Status: She is alert and oriented to person, place, and time.   Psychiatric:         Mood and Affect: Mood normal.         Behavior: Behavior normal.       Significant Labs: All pertinent labs within the past 24 hours have been reviewed.    Significant Imaging: I have reviewed all pertinent imaging results/findings within the past 24 hours.

## 2022-06-14 NOTE — ASSESSMENT & PLAN NOTE
Acute on chronic combines systolic and diastolic HF   Echo  5/24/2022  Summary  · The left ventricle is severely enlarged with eccentric hypertrophy and severely decreased systolic function.  · Severe left atrial enlargement.  · Grade III left ventricular diastolic dysfunction.  · Normal right ventricular size with normal right ventricular systolic function.  · Mild-to-moderate mitral regurgitation.  · Mild tricuspid regurgitation.  · There is mild pulmonary hypertension.  - CHF pathways initiated   - monitor during diuresis     -  20 mg lasix oral daily with 12.5 mg aldactone    - clinically euvolemic

## 2022-06-14 NOTE — PLAN OF CARE
06/14/22 1744   Discharge Reassessment   Assessment Type Discharge Planning Reassessment   Did the patient's condition or plan change since previous assessment? No   Discharge Plan discussed with: Patient   Communicated SIVAN with patient/caregiver Other (see comments)  (Date could be tomorrow.)   Discharge Plan A Home with family;Home Health   DME Needed Upon Discharge  none   Discharge Barriers Identified None   Why the patient remains in the hospital Placement issues   Post-Acute Status   Post-Acute Authorization Home Health   Home Health Status Referrals Sent   Discharge Delays None known at this time     ADAN visited with the patient today to discuss her d/c. The patient would like to go to [facility], and was willing to go to [place] if there is a bed available there and she was accepted. Patient stated that she spoke to [facility] earlier today and they agreed to take her back after d/c. There was some question about this when she was first re-admitted, because  said that the patient needed someone in the home with her, but had no one. She explained to [place] that her granddaughter who is a young adult, will be staying with her. If she does not get into one of these two SNFs, she wants to go home with [facility].        Gary answered back in [facility] this afternoon stating that the patient was not accepted there.  There is no bed available at [facility] until Thursday or Friday of this week. There is no guarantee that when there is an open bed, Ms. Hart will get it.        ADAN informed Dr. Roy of this. He said she will be d/c'd with  in the AM.

## 2022-06-14 NOTE — PLAN OF CARE
Referral update:    1. [facility]: unable to meet needs  2. [facility]: no beds until the end of the week      ADAN Pino updated and will follow up with the patient. She will continue to follow for d/c needs.

## 2022-06-14 NOTE — PT/OT/SLP PROGRESS
Physical Therapy      Patient Name:  Odette Hart   MRN:  [id number]    Patient not seen today secondary to pt reports being sleepy from pain meds recently. Reports she has been up and ambulating again. Will follow-up in AM.

## 2022-06-15 PROBLEM — L03.311 CELLULITIS OF ABDOMINAL WALL: Status: ACTIVE | Noted: 2022-01-01

## 2022-06-15 NOTE — PROGRESS NOTES
Pharmacokinetic Initial Assessment: IV Vancomycin    Assessment/Plan:    Initiate intravenous vancomycin with loading dose of 1250 mg once followed by a maintenance dose of vancomycin 1000 mg IV every 24 hours  Desired empiric serum trough concentration is 10 to 20 mcg/mL  Draw vancomycin trough level 30 min prior to third dose on 6/17/22 at approximately 1330  Pharmacy will continue to follow and monitor vancomycin.      Please contact pharmacy at extension [phone] with any questions regarding this assessment.     Thank you for the consult,   Pallavi Pearl       Patient brief summary:  Odette Hart is a 66 y.o. female initiated on antimicrobial therapy with IV Vancomycin for treatment of suspected skin & soft tissue infection    Drug Allergies:   Review of patient's allergies indicates:   Allergen Reactions    Augmentin [amoxicillin-pot clavulanate] Swelling     Not allergic to amoxicillin just a derivative of augmentin    Lisinopril Other (See Comments)     Fluid around heart    Losartan Other (See Comments)     High potassium    Shellfish containing products Anaphylaxis     Pt.states she is allergic to SEAFOOD since the age of 13    Levaquin [levofloxacin] Other (See Comments)     Very bad joint pain    Clindamycin Palpitations     Chest pain       Actual Body Weight:   50.1 kg    Renal Function:   Estimated Creatinine Clearance: 63.4 mL/min (based on SCr of 0.7 mg/dL).,     Dialysis Method (if applicable):  N/A    CBC (last 72 hours):  No results for input(s): WHITE BLOOD CELL COUNT, HEMOGLOBIN, HEMATOCRIT, PLATELETS, GRAN%, LYMPH%, MONO%, EOSINOPHIL%, BASOPHIL%, DIFFERENTIAL METHOD in the last 72 hours.    Metabolic Panel (last 72 hours):  No results for input(s): SODIUM, POTASSIUM, CHLORIDE, CO2, GLUCOSE, BUN BLD, CREATININE, ALBUMIN, BILIRUBIN TOTAL, ALK PHOS, AST, ALT, MAGNESIUM, PHOSPHORUS in the last 72 hours.    Drug levels (last 3 results):  No results for input(s): VANCOMYCINRA, VANCORANDOM,  VANCOMYCINPE, VANCOPEAK, VANCOMYCINTR, VANCOTROUGH in the last 72 hours.    Microbiologic Results:  Microbiology Results (last 7 days)       ** No results found for the last 168 hours. **

## 2022-06-15 NOTE — PROGRESS NOTES
[facility] - Med Surg ([facility])  Adult Nutrition  Progress Note    SUMMARY       Recommendations  1. Continue low fiber/residue diet as tolerated.    -continue ONS Ezra BID and Boost Plus TID as tolerated.   2.Encourage good PO intake.    Goals: Pt to meet % EEN/EPN via PO intake + ONS by RD f/u.  Nutrition Goal Status: new  Communication of RD Recs:  (POC)    Assessment and Plan    Increased nutritional needs  Contributing Nutrition Diagnosis  Increased nutrient needs; kcal/protein    Related to (etiology):   Increase demand for nutrients wounds    Signs and Symptoms (as evidenced by):   PO intake 75%, tolerating PO intake, receiving wounds  multiple wounds    Interventions/Recommendations (treatment strategy):  Collaboraiton with other healthcare providers  Low fiber/residue diet  ONS    Nutrition Diagnosis Status:   New    Malnutrition Assessment     Skin (Micronutrient):  (Paul Score 19)     Reason for Assessment    Reason For Assessment: RD follow-up  Diagnosis:  (acute on chronic CHF)  Relevant Medical History:   Past Medical History:   Diagnosis Date    Acute coronary syndrome     Acute exacerbation of chronic obstructive pulmonary disease (COPD) 3/23/2022    Allergy     Arthritis     Cardiomyopathy     CHF (congestive heart failure)     Coronary artery disease     Coronary artery disease of native artery of native heart with stable angina pectoris 4/19/2021 1/24/2022: [facility]: Cath: LAD: Proximal stent patent. LCX: Proximal 80%. LCX: Dominant. Moderate disease. LCX: HEVER 2.75 x 18 mm. Distal dissection. HEVER 2.75 x 22 mm.     Diverticulitis     Diverticulosis     Familial hypercholesterolemia 1/22/2022    Former smoker 1/24/2022    Heart attack     Heart disease     History of myocardial infarction 1/24/2022    Hyperlipidemia     Hypertension     Hypertension 1/24/2022    ICD (implantable cardioverter-defibrillator) in place     Non-ST elevation myocardial infarction (NSTEMI)  "2/4/2019     Interdisciplinary Rounds: attended  General Information Comments: 6/15- RD f/u. Pt  on cardiac diet. With Boost Plus and Ezra ordred. Tolerating diet. PO intake 75%. Appetite is improving. Pt was walking in room on 6/15. Has been with additional pustules.Paul Score 19. Will continue to monitor. NFPE not warranted.    Nutrition Discharge Planning: Pt to follow a low Na diet with FR as tolerated.    Nutrition Risk Screen    Nutrition Risk Screen: large or nonhealing wound, burn or pressure injury    Nutrition/Diet History    Spiritual, Cultural Beliefs, Orthodoxy Practices, Values that Affect Care: no    Anthropometrics    Temp: 97.5 °F (36.4 °C)  Height Method: Stated  Height: 5' 3" (160 cm)  Height (inches): 63 in  Weight Method: Standard Scale  Weight: 50.1 kg (110 lb 7.9 oz)  Weight (lb): 110.5 lb  Ideal Body Weight (IBW), Female: 115 lb  % Ideal Body Weight, Female (lb): 97.29 %  BMI (Calculated): 19.6  BMI Grade: 18.5-24.9 - normal    Lab/Procedures/Meds    Pertinent Labs Reviewed: reviewed  Pertinent Labs Comments: none  Pertinent Medications Reviewed: reviewed  Pertinent Medications Comments: carvedilol, clopidogrel, collagenase, docusate Na, enoxparin, exetimibe, furosemide, pantoprazole, polyethylene glycol, spironolactone, vanc.    Estimated/Assessed Needs    Weight Used For Calorie Calculations: 50.1 kg (110 lb 7.2 oz)  Energy Calorie Requirements (kcal): 1400 kcal day (MSJ X AF 1.4)  Energy Need Method: [place]- Joelor  Protein Requirements: 70 g day (1.4 g/kg- wounds/ CHF)  Weight Used For Protein Calculations: 50.1 kg (110 lb 7.2 oz)  Estimated Fluid Requirement Method: RDA Method  RDA Method (mL): 1400  CHO Requirement: 175 g day    Nutrition Prescription Ordered    Current Diet Order: low fiber/residue  Oral Nutrition Supplement: Ezra BID and Boost Plus TID    Evaluation of Received Nutrient/Fluid Intake    I/O: -5.7L since admit   Tolerance: tolerating  % Intake of Estimated Energy " Needs: 50 - 75 %  % Meal Intake: 50 - 75 %    Nutrition Risk    Level of Risk/Frequency of Follow-up:  (1 x weekly)     Monitor and Evaluation    Food and Nutrient Intake: energy intake, food and beverage intake  Food and Nutrient Adminstration: diet order  Knowledge/Beliefs/Attitudes: food and nutrition knowledge/skill  Physical Activity and Function: nutrition-related ADLs and IADLs  Anthropometric Measurements: weight, weight change, body mass index  Biochemical Data, Medical Tests and Procedures: glucose/endocrine profile, gastrointestinal profile, inflammatory profile, electrolyte and renal panel  Nutrition-Focused Physical Findings: overall appearance     Nutrition Follow-Up    RD Follow-up?: Yes

## 2022-06-15 NOTE — PROGRESS NOTES
[facility] - Med Surg [facility])  Wound Care    Patient Name:  Odette Hart   MRN:  [id number]  Date: 6/15/2022  Diagnosis: Acute on chronic congestive heart failure    History:     Past Medical History:   Diagnosis Date    Acute coronary syndrome     Acute exacerbation of chronic obstructive pulmonary disease (COPD) 3/23/2022    Allergy     Arthritis     Cardiomyopathy     CHF (congestive heart failure)     Coronary artery disease     Coronary artery disease of native artery of native heart with stable angina pectoris 2021: [facility]: Cath: LAD: Proximal stent patent. LCX: Proximal 80%. LCX: Dominant. Moderate disease. LCX: HEVER 2.75 x 18 mm. Distal dissection. HEVER 2.75 x 22 mm.     Diverticulitis     Diverticulosis     Familial hypercholesterolemia 2022    Former smoker 2022    Heart attack     Heart disease     History of myocardial infarction 2022    Hyperlipidemia     Hypertension     Hypertension 2022    ICD (implantable cardioverter-defibrillator) in place     Non-ST elevation myocardial infarction (NSTEMI) 2019       Social History     Socioeconomic History    Marital status:    Tobacco Use    Smoking status: Former Smoker     Packs/day: 0.50     Start date: 1976     Quit date: 2012     Years since quittin.5    Smokeless tobacco: Never Used   Substance and Sexual Activity    Alcohol use: No    Drug use: No     Social Determinants of Health     Financial Resource Strain: Low Risk     Difficulty of Paying Living Expenses: Not hard at all   Food Insecurity: No Food Insecurity    Worried About Running Out of Food in the Last Year: Never true    Ran Out of Food in the Last Year: Never true   Transportation Needs: No Transportation Needs    Lack of Transportation (Medical): No    Lack of Transportation (Non-Medical): No   Physical Activity: Inactive    Days of Exercise per Week: 0 days    Minutes of Exercise per Session: 0 min   Stress: No Stress Concern  Present    Feeling of Stress : Not at all   Social Connections: Socially Isolated    Frequency of Communication with Friends and Family: More than three times a week    Frequency of Social Gatherings with Friends and Family: Once a week    Attends [employer] Services: Never    Active Member of Clubs or Organizations: No    Attends Club or Organization Meetings: Never    Marital Status:    Housing Stability: Low Risk     Unable to Pay for Housing in the Last Year: No    Number of Places Lived in the Last Year: 1    Unstable Housing in the Last Year: No       Precautions:     Allergies as of 06/08/2022 - Reviewed 06/08/2022   Allergen Reaction Noted    Augmentin [amoxicillin-pot clavulanate] Swelling 02/19/2018    Lisinopril Other (See Comments) 02/19/2018    Losartan Other (See Comments) 02/19/2018    Shellfish containing products Anaphylaxis 09/10/2018    Levaquin [levofloxacin] Other (See Comments) 09/18/2018    Clindamycin Palpitations 01/21/2019       [facility] Assessment Details/Treatment     Patient requesting wound care  States nobody has changed the dressing today. Informed her the nursing staff has orders to perform the wound care . Informed her I would be happy to change her dressing at this time.   Midline dressing changed and umbilicus opening had moderate exudate,aquacel was soaked. Cleaned and packed wound with aquacel AG ribbon, covered with mepore dressing.   Sacral wound red and granulating 60% and slough filled 40% was able to remove some loose slough from wound base. Salvatore applied and dressed with sacral foam. Still complaining the wound is quite painful and is unable to lie for any long periods of time on her back. Noted red pustules on abdomen as documented yesterday. Discussed with Dr Moncada earlier.    06/15/2022

## 2022-06-15 NOTE — PLAN OF CARE
Recommendations  1. Continue low fiber/residue diet as tolerated.    -continue ONS Ezra BID and Boost Plus TID as tolerated.   2.Encourage good PO intake.    Goals: Pt to meet % EEN/EPN via PO intake + ONS by RD f/u.  Nutrition Goal Status: new  Communication of RD Recs:  (POC)

## 2022-06-15 NOTE — PT/OT/SLP PROGRESS
Physical Therapy Treatment    Patient Name:  Odette Hart   MRN:  [id number]    Recommendations:     Discharge Recommendations:  home health PT, home health OT, home with home health (pt will need woundcare as well)  Discharge Equipment Recommendations: colostomy/ostomy supplies   Barriers to discharge: None    Assessment:     Odette Hart is a 66 y.o. female admitted with a medical diagnosis of Acute on chronic congestive heart failure.  She presents with the following impairments/functional limitations:  weakness, impaired endurance, impaired self care skills, impaired functional mobilty, decreased lower extremity function, impaired skin, impaired cardiopulmonary response to activity ;pt with good mobility today, amb'd ~400' w/ rollator and Supervision, 4 brief standing rests.    Rehab Prognosis: Good; patient would benefit from acute skilled PT services to address these deficits and reach maximum level of function.    Recent Surgery: * No surgery found *      Plan:     During this hospitalization, patient to be seen 3 x/week to address the identified rehab impairments via gait training, therapeutic activities, therapeutic exercises, neuromuscular re-education and progress toward the following goals:    · Plan of Care Expires:  06/22/22    Subjective     Chief Complaint: pt concerned about her abdominal incision, and the drainage that is present.   Patient/Family Comments/goals: pt worried about who will be doing woundcare if she goes home. Pt stating she would rather go to [facility].  Pain/Comfort:  · Pain Rating 1: 0/10 (pt reports L sided abd pain yesterday, but did not c/o pain at this time)  · Pain Rating Post-Intervention 1: 0/10      Objective:     Communicated with nurse prior to session.  Patient found right sidelying with peripheral IV, colostomy upon PT entry to room.     General Precautions: Standard, fall (cardiac diet)   Orthopedic Precautions:N/A   Braces: N/A  Respiratory Status: Room  air     Functional Mobility:  · Bed Mobility:     · Supine to Sit: modified independence  · Transfers:     · Sit to Stand:  modified independence with 4 wheeled walker  · Gait: amb'd ~400' w/ rollator and Supervision; ~4 brief standing rests 2/2 fatigue, O2 sats upper 90's on RA.      AM-PAC 6 CLICK MOBILITY  Turning over in bed (including adjusting bedclothes, sheets and blankets)?: 4  Sitting down on and standing up from a chair with arms (e.g., wheelchair, bedside commode, etc.): 4  Moving from lying on back to sitting on the side of the bed?: 4  Moving to and from a bed to a chair (including a wheelchair)?: 4  Need to walk in hospital room?: 3  Climbing 3-5 steps with a railing?: 3  Basic Mobility Total Score: 22       Therapeutic Activities and Exercises:   pt perf'd standing LE ex's of heelraises, hip flex, hip abd, hip ext., hs curls x 5 reps ea., holding onto bedrail for support.     Patient left up in chair with all lines intact and call button in reach..    GOALS:   Multidisciplinary Problems     Physical Therapy Goals        Problem: Physical Therapy    Goal Priority Disciplines Outcome Goal Variances Interventions   Physical Therapy Goal     PT, PT/OT Ongoing, Progressing     Description: Goals to be met by: 22    Patient will increase functional independence with mobility by performin. TUG in less than 12 sec to indicate not at increased risk for falls.   2. Gait x 150 feet with supervision with rollator or no AD.  3. Ascend/descend 4 step(s) with no AD and BHR.                    Time Tracking:     PT Received On: 06/15/22  PT Start Time: 1053     PT Stop Time: 1117  PT Total Time (min): 24 min     Billable Minutes: Gait Training 14 and Therapeutic Exercise 10    Treatment Type: Treatment  PT/PTA: PTA     PTA Visit Number: 3     06/15/2022

## 2022-06-15 NOTE — PLAN OF CARE
Referral update:    Per Jen with [facility], PT/OT recommended home health on Friday The patient has not worked with therapy since then.  On 6/14, patient refused therapy as she was returning from a walk.  Per Jen, home health is a recommendation. Denial from [facility]. Dr. Moncada and ADAN Pion updated.    06/15/22 1153   Post-Acute Status   Post-Acute Authorization Placement   Post-Acute Placement Status Pending payor review/awaiting authorization (if required)   Discharge Plan   Discharge Plan A Home Health;Home   Discharge Plan B Home

## 2022-06-15 NOTE — ASSESSMENT & PLAN NOTE
Contributing Nutrition Diagnosis  Increased nutrient needs; kcal/protein    Related to (etiology):   Increase demand for nutrients wounds    Signs and Symptoms (as evidenced by):   PO intake 75%, tolerating PO intake, receiving wounds  multiple wounds    Interventions/Recommendations (treatment strategy):  Collaboraiton with other healthcare providers  Low fiber/residue diet  ONS    Nutrition Diagnosis Status:   New

## 2022-06-15 NOTE — PLAN OF CARE
Patient is AAOx4. Patient reported pain and nausea during the night and medications were administered per orders. VSS on RA. Wounds CDI. Patient resting comfortably at present. Bed locked in lowest positions with side rails up x 2. Call light within reach of patient. Will continue purposeful rounding.

## 2022-06-15 NOTE — ASSESSMENT & PLAN NOTE
Patient with new eruption of pustules over her abdomen.  Wound care nurse had noted coccygeal DTI on presentation as well as 2 pustules on her buttocks.  Now with new pustules on her abdomen.  Cause of this is unclear, although appears to be a Staph infection.  Will start IV antibiotics.  Surgery following, unclear how this is related to the recent surgery/ostomy, although there is wound dehiscence.

## 2022-06-15 NOTE — SUBJECTIVE & OBJECTIVE
Interval History:  Patient is new to me.  She is concerned about all the new pustular lesions on her abdomen.  One lesion is just under the ostomy site.    Review of Systems   Constitutional:  Negative for chills and fever.   Respiratory:  Negative for cough and shortness of breath.    Cardiovascular:  Negative for chest pain and palpitations.   Skin:         Multiple pustular lesions scattered over abdomen.     Objective:     Vital Signs (Most Recent):  Temp: 98.4 °F (36.9 °C) (06/15/22 1553)  Pulse: 78 (06/15/22 1553)  Resp: 17 (06/15/22 1614)  BP: (!) 86/50 (06/15/22 1553)  SpO2: 97 % (06/15/22 1553)   Vital Signs (24h Range):  Temp:  [97.5 °F (36.4 °C)-99.2 °F (37.3 °C)] 98.4 °F (36.9 °C)  Pulse:  [78-93] 78  Resp:  [16-20] 17  SpO2:  [95 %-98 %] 97 %  BP: ()/(48-64) 86/50     Weight: 50.1 kg (110 lb 7.9 oz)  Body mass index is 19.57 kg/m².    Intake/Output Summary (Last 24 hours) at 6/15/2022 1630  Last data filed at 6/15/2022 0200  Gross per 24 hour   Intake 480 ml   Output --   Net 480 ml      Physical Exam  Cardiovascular:      Rate and Rhythm: Normal rate and regular rhythm.      Heart sounds: No murmur heard.    No gallop.   Pulmonary:      Effort: Pulmonary effort is normal.      Breath sounds: Normal breath sounds.   Abdominal:      Comments: Midline incision with areas of dehiscence.  Dressing right side of abdomen with drainage noted.  Multiple red pustules scattered over abdomen.   Neurological:      Mental Status: She is alert.       Significant Labs: All pertinent labs within the past 24 hours have been reviewed.    Significant Imaging: I have reviewed all pertinent imaging results/findings within the past 24 hours.

## 2022-06-15 NOTE — PLAN OF CARE
[facility] ([facility])      HOME HEALTH ORDERS  FACE TO FACE ENCOUNTER    Patient Name: Odette Hart  YOB: 1955    PCP: Braxton Barragan MD   PCP Address: [address] / [place] [address]  PCP Phone Number: [phone]  PCP Fax: [phone]    Encounter Date: 6/8/22    Admit to Home Health    Diagnoses:  Active Hospital Problems    Diagnosis  POA    *Acute on chronic congestive heart failure [I50.9]  Yes    Superficial dehiscence of operation wound [T81.31XA]  Yes     Status post colectomy 1 month ago      CAD (coronary artery disease) [I25.10]  Yes    Debility [R53.81]  Yes    Anemia [D64.9]  Yes    Primary hypertension [I10]  Yes    Ischemic cardiomyopathy [I25.5]  Yes      Resolved Hospital Problems    Diagnosis Date Resolved POA    Chronic hypoxemic respiratory failure [J96.11] 06/09/2022 Yes    Acute cystitis without hematuria [N30.00] 06/13/2022 Yes       Follow Up Appointments:  Future Appointments   Date Time Provider Department Center   6/17/2022  9:00 AM Rafa Cardozo MD [facility]   6/20/2022 12:15 PM Braxton Barragan MD [facility]   7/7/2022  2:30 PM Freddy Arenas MD [facility]       Allergies:  Review of patient's allergies indicates:   Allergen Reactions    Augmentin [amoxicillin-pot clavulanate] Swelling     Not allergic to amoxicillin just a derivative of augmentin    Lisinopril Other (See Comments)     Fluid around heart    Losartan Other (See Comments)     High potassium    Shellfish containing products Anaphylaxis     Pt.states she is allergic to SEAFOOD since the age of 13    Levaquin [levofloxacin] Other (See Comments)     Very bad joint pain    Clindamycin Palpitations     Chest pain       Medications: Review discharge medications with patient and family and provide education.      Current Discharge Medication List      CONTINUE these medications which have NOT CHANGED    Details    acetaminophen (TYLENOL) 500 MG tablet Take 2 tablets (1,000 mg total) by mouth every 8 (eight) hours as needed for Pain.  Refills: 0      albuterol (PROVENTIL/VENTOLIN HFA) 90 mcg/actuation inhaler Inhale 2 puffs into the lungs every 6 (six) hours as needed for Wheezing. Rescue  Qty: 18 g, Refills: 1    Associated Diagnoses: Bronchitis      ALPRAZolam (XANAX) 0.5 MG tablet Take 1 tablet (0.5 mg total) by mouth nightly as needed for Anxiety.  Qty: 30 tablet, Refills: 2    Associated Diagnoses: Anxiety      aspirin (ECOTRIN) 81 MG EC tablet Take 1 tablet (81 mg total) by mouth once daily.  Qty: 90 tablet, Refills: 0      carvediloL (COREG) 3.125 MG tablet Take 1 tablet (3.125 mg total) by mouth 2 (two) times daily with meals.  Qty: 60 tablet, Refills: 0    Comments: .      clopidogreL (PLAVIX) 75 mg tablet Take 1 tablet (75 mg total) by mouth once daily.  Qty: 30 tablet, Refills: 0      ezetimibe (ZETIA) 10 mg tablet Take 1 tablet (10 mg total) by mouth every evening.  Qty: 90 tablet, Refills: 3      fluticasone propionate (FLONASE) 50 mcg/actuation nasal spray 1 spray (50 mcg total) by Each Nostril route once daily.  Qty: 9.9 mL, Refills: 1    Associated Diagnoses: Sinusitis, unspecified chronicity, unspecified location      furosemide (LASIX) 20 MG tablet Take 1 tablet (20 mg total) by mouth once daily.  Qty: 30 tablet, Refills: 11      HYDROcodone-acetaminophen (NORCO) 5-325 mg per tablet Take 1 tablet by mouth every 12 (twelve) hours as needed for Pain.  Qty: 16 tablet, Refills: 0    Comments: Quantity prescribed more than 7 day supply? Yes, quantity medically necessary  Associated Diagnoses: Epigastric pain      Lactobacillus rhamnosus GG (CULTURELLE) 10 billion cell capsule Take 1 capsule by mouth once daily.      nitroGLYCERIN (NITROSTAT) 0.4 MG SL tablet Place 1 tablet (0.4 mg total) under the tongue every 5 (five) minutes as needed for Chest pain.  Qty: 25 tablet, Refills: 0      pantoprazole (PROTONIX) 40  MG tablet Take 1 tablet (40 mg total) by mouth once daily.  Qty: 30 tablet, Refills: 1    Associated Diagnoses: Epigastric pain      polyethylene glycol (GLYCOLAX) 17 gram PwPk Take 17 g by mouth once daily.  Qty: 1 packet, Refills: 0      simethicone (MYLICON) 80 MG chewable tablet Take 80 mg by mouth every 8 (eight) hours as needed.      spironolactone (ALDACTONE) 25 MG tablet Take 0.5 tablets (12.5 mg total) by mouth once daily.  Qty: 15 tablet, Refills: 11    Comments: .         STOP taking these medications       metoprolol succinate (TOPROL-XL) 25 MG 24 hr tablet Comments:   Reason for Stopping:         prasugreL (EFFIENT) 10 mg Tab Comments:   Reason for Stopping:                 I have seen and examined this patient within the last 30 days. My clinical findings that support the need for the home health skilled services and home bound status are the following:no   Weakness/numbness causing balance and gait disturbance due to Weakness/Debility making it taxing to leave home.     Diet:   cardiac diet      Referrals/ Consults  Physical Therapy to evaluate and treat. Evaluate for home safety and equipment needs; Establish/upgrade home exercise program. Perform / instruct on therapeutic exercises, gait training, transfer training, and Range of Motion.  Occupational Therapy to evaluate and treat. Evaluate home environment for safety and equipment needs. Perform/Instruct on transfers, ADL training, ROM, and therapeutic exercises.   to evaluate for community resources/long-range planning.  Aide to provide assistance with personal care, ADLs, and vital signs.    Activities:   activity as tolerated    Nursing:   Agency to admit patient within 24 hours of hospital discharge unless specified on physician order or at patient request    SN to complete comprehensive assessment including routine vital signs. Instruct on disease process and s/s of complications to report to MD. Review/verify medication list sent  home with the patient at time of discharge  and instruct patient/caregiver as needed. Frequency may be adjusted depending on start of care date.     Skilled nurse to perform up to 3 visits PRN for symptoms related to diagnosis    Notify MD if SBP > 160 or < 90; DBP > 90 or < 50; HR > 120 or < 50; Temp > 101; O2 < 88%    Ok to schedule additional visits based on staff availability and patient request on consecutive days within the home health episode.    When multiple disciplines ordered:    Start of Care occurs on Sunday - Wednesday schedule remaining discipline evaluations as ordered on separate consecutive days following the start of care.    Thursday SOC -schedule subsequent evaluations Friday and Monday the following week.     Friday - Saturday SOC - schedule subsequent discipline evaluations on consecutive days starting Monday of the following week.    For all post-discharge communication and subsequent orders please contact patient's primary care physician.    Wound Care Orders  1.  Twice weekly:  Left arm skin tear- clean with wound cleanser and apply mepilex border dressing.  2.  Daily:  Clean sacral ulcer wound with sterile normal saline. Apply skin prep to periwound. Apply Santyl ointment (collagenase), nickel-thick, to wound bed and cover with sacral mepilex.  3.  Daily:  Clean abdominal wounds with Vashe. Apply Triad to all slough filled openings , pack a small thin ribbon of Aquacel AG into umbilicus wound and cover with [place] dressings.      I certify that this patient is confined to her home and needs intermittent skilled nursing care, physical therapy and occupational therapy.

## 2022-06-15 NOTE — PROGRESS NOTES
"[facility] [place], operated by [facility]  Progress Note    Patient Name: Odette Hart  MRN: [id number]  Patient Class: IP- Inpatient   Admission Date: 6/8/2022  Length of Stay: 7 days  Attending Physician: Elina Moncada MD  Primary Care Provider: Braxton Barragan MD        Subjective:     Principal Problem:Acute on chronic congestive heart failure        HPI:  From H&P by Martha Hameed PA:  "Ms. Odette Hart is a 66 y.o. female, with PMH of HFrEF (EF 20%), CAD s/p 2x HEVER placement in 1/2022 (on Plavix), Ischemic Cardiomyopathy s/p ICD placement, RBBB, HTN, calculus cholecystitis s/p recent cholecystectomy, colovesicular fistula 2/2 complicated diverticulitis s/p cystoscopy with BL ureteral catheters, open sigmoid colon resection, appendectomy, and creation of end colostomy on 5/9/22, anemia, chronic hypoxemic respiratory failure (not on home O2), MDD, Debility, who presented to [facility] ED on 6/8/22 due to shortness of breath. She notes sudden onset a few days ago, with worsening tonight. She states this is worse with exertion. She also notes pain around her colostomy site over the past several days. She was seen in the ED due to this pain on 6/6/22 and was diagnosed with a UTI, and discharged on Macrobid. She states she had two episodes of emesis yesterday. She was evaluated in the ED with labs showing anemia with H&H of 10.9/33.1, ALP of 138, BNP of >4900. CXR showed prominent pulmonary vasculature, with interstitial and patchy alveolar infiltrates, and without pleural effusion. She was treated in the ED with 40 mg IV lasix. She was admitted to inpatient status."      Overview/Hospital Course:  Patient diuresed with improvement in her shortness of breath and lower extremity swelling.  Able to tolerate her diet.  She was seen by surgery and wound care ordered to evaluate the ostomy and midline incision with dehisced areas.  In addition to dehiscence the wound care nurse noted multiple pustular " lesions scattered on the patient's buttocks and abdomen that were new.  She was started on IV vancomycin on 6/15.      Interval History:  Patient is new to me.  She is concerned about all the new pustular lesions on her abdomen.  One lesion is just under the ostomy site.    Review of Systems   Constitutional:  Negative for chills and fever.   Respiratory:  Negative for cough and shortness of breath.    Cardiovascular:  Negative for chest pain and palpitations.   Skin:         Multiple pustular lesions scattered over abdomen.     Objective:     Vital Signs (Most Recent):  Temp: 98.4 °F (36.9 °C) (06/15/22 1553)  Pulse: 78 (06/15/22 1553)  Resp: 17 (06/15/22 1614)  BP: (!) 86/50 (06/15/22 1553)  SpO2: 97 % (06/15/22 1553)   Vital Signs (24h Range):  Temp:  [97.5 °F (36.4 °C)-99.2 °F (37.3 °C)] 98.4 °F (36.9 °C)  Pulse:  [78-93] 78  Resp:  [16-20] 17  SpO2:  [95 %-98 %] 97 %  BP: ()/(48-64) 86/50     Weight: 50.1 kg (110 lb 7.9 oz)  Body mass index is 19.57 kg/m².    Intake/Output Summary (Last 24 hours) at 6/15/2022 1630  Last data filed at 6/15/2022 0200  Gross per 24 hour   Intake 480 ml   Output --   Net 480 ml      Physical Exam  Cardiovascular:      Rate and Rhythm: Normal rate and regular rhythm.      Heart sounds: No murmur heard.    No gallop.   Pulmonary:      Effort: Pulmonary effort is normal.      Breath sounds: Normal breath sounds.   Abdominal:      Comments: Midline incision with areas of dehiscence.  Dressing right side of abdomen with drainage noted.  Multiple red pustules scattered over abdomen.   Neurological:      Mental Status: She is alert.       Significant Labs: All pertinent labs within the past 24 hours have been reviewed.    Significant Imaging: I have reviewed all pertinent imaging results/findings within the past 24 hours.      Assessment/Plan:      * Acute on chronic congestive heart failure  Acute on chronic combines systolic and diastolic HF   Echo  5/24/2022  Summary  · The left  ventricle is severely enlarged with eccentric hypertrophy and severely decreased systolic function.  · Severe left atrial enlargement.  · Grade III left ventricular diastolic dysfunction.  · Normal right ventricular size with normal right ventricular systolic function.  · Mild-to-moderate mitral regurgitation.  · Mild tricuspid regurgitation.  · There is mild pulmonary hypertension.  - CHF pathways initiated   - monitor during diuresis     -  20 mg lasix oral daily with 12.5 mg aldactone    - clinically euvolemic      Cellulitis of abdominal wall  Patient with new eruption of pustules over her abdomen.  Wound care nurse had noted coccygeal DTI on presentation as well as 2 pustules on her buttocks.  Now with new pustules on her abdomen.  Cause of this is unclear, although appears to be a Staph infection.  Will start IV antibiotics.  Surgery following, unclear how this is related to the recent surgery/ostomy, although there is wound dehiscence.      Superficial dehiscence of operation wound  appr sx recs  Wound care  Had leaking ostomy at home, doing good here  Recent ct abdomen 6/6 showed post operative changes without a large focal fluid collection.      Increased nutritional needs        CAD (coronary artery disease)  - continue Plavix , asa, zetia  - 1/24/2022: [facility]: Cath: LAD: Proximal stent patent. LCX: Proximal 80%. LCX: Dominant. Moderate disease. LCX: HEVER 2.75 x 18 mm. Distal dissection. HEVER 2.75 x 22 mm.       Debility  - fall precautions   - SNF vs HH    Anemia  - H&H appear to be at approximate baseline   - monitor       Primary hypertension  - mildly hypotensive with preserved MAP at time of admission   - home meds: carvedilol 3.125 mg BID, lasix 20 mg QD, spironolactone 12.5 mg QD  - monitor     Ischemic cardiomyopathy  - history noted   - s/p ICD placement         VTE Risk Mitigation (From admission, onward)         Ordered     enoxaparin injection 40 mg  Daily         06/08/22 1122     IP VTE HIGH  RISK PATIENT  Once         06/08/22 0253     Place sequential compression device  Until discontinued         06/08/22 0253     Place sequential compression device  Until discontinued         06/08/22 0252                      Elina Lyons MD  Department of Hospital Medicine   [facility]

## 2022-06-16 NOTE — ASSESSMENT & PLAN NOTE
Patient with new eruption of pustules over her abdomen.  Wound care nurse had noted coccygeal DTI on presentation as well as 2 pustules on her buttocks.  Now with new pustules on her abdomen.  Cause of this is unclear, although appears to be a Staph infection.  Started vancomycin.  Surgery following, unclear how this is related to the recent surgery/ostomy, although there is some wound dehiscence that looks unrelated to the skin infection.

## 2022-06-16 NOTE — PT/OT/SLP PROGRESS
Occupational Therapy   Treatment    Name: Odette Hart  MRN: [id number]  Admitting Diagnosis:  Acute on chronic congestive heart failure       Recommendations:     Discharge Recommendations: home health OT, home health PT (Assist as needed)  Discharge Equipment Recommendations:  colostomy/ostomy supplies; Pt would benefit from hospital bed to elevate chest/head and LEs due to cardiopulmonary diagnoses  Barriers to discharge:   (Pt receiving antibiotics for wound/infection.)    Assessment:     Odette Hart is a 66 y.o. female with a medical diagnosis of Acute on chronic congestive heart failure.  She presents alert and agreeable to participating in OT tx session.   Pt receptive to all education and training today for ADL and bilateral hand strengthening.  Pt reporting she felt her hands were getting weaker so pt issued Theraputty and trained in HEP.  Pt with concerns about he cardiac diet and wanting to know foods that will not produce a lot of gas due to ileostomy throughout tx session.  Meme COLE RD and Kell FLORES RN contacted about pt's concerns through secure chat.  Performance deficits affecting function are weakness, impaired endurance, impaired self care skills, impaired functional mobilty, gait instability, impaired balance, decreased lower extremity function, impaired skin, impaired cardiopulmonary response to activity, pain.     Rehab Prognosis:  Good; patient would benefit from acute skilled OT services to address these deficits and reach maximum level of function.       Plan:     Patient to be seen 3 x/week to address the above listed problems via self-care/home management, therapeutic activities, therapeutic exercises  · Plan of Care Expires: 06/24/22  · Plan of Care Reviewed with: patient    Subjective   Pt stating she is concerned about going home with an infection.      Pain/Comfort:  · Pain Rating 1:  (Pt reporting some buttocks pain.)  · Location - Orientation 1: generalized  · Location 1:   (Sacral/coccyx area)  · Pain Addressed 1: Reposition (Pt to call for pain medication if needed using call button.)  · Pain Rating Post-Intervention 1:  (Pt more comfortable in right sidelying in bed and reporting she was going to call nurse for pain medication using call button)    Objective:     Communicated with: nurseLinda prior to session.  Patient found up in chair with peripheral IV, colostomy upon OT entry to room.    General Precautions: Standard, fall (Cardiac diet)   Orthopedic Precautions:N/A   Braces: N/A  Respiratory Status: Room air     Occupational Performance:     Bed Mobility:    · Patient completed Sit to Supine with supervision     Functional Mobility/Transfers:  · Sit to stand: Mod I  · Transfers: Supervision  · Functional Mobility: No LOB during ADL mobility in room    Activities of Daily Living:  · Pt educated on not bringing her head below her heart level during functional tasks and positioning.  Pt has a recliner in her sofa but has some difficulty pushing to foot rest back down when she elevates her feet at home.  Pt trained in crossing foot over opposite knee to reach her feet for self care tasks.  Pt needing assist of her UEs to lift each leg to position herself with foot over knee but able to do it with right and left LE.  Pt educated on positioning herself in bed with her head and chest elevated due to CHF.      Theraputty Hand Exercises:    and pinch exercises with mix of yellow and pink Theraputty since pt reporting the yellow was too easy.  Pt reporting she had the yellow Theraputty at home so HEP started with yellow strength.     [facility] 6 Click ADL: 19    Treatment & Education:    Role of OT, POC, adaptive techniques for LB self care, positioning in bed and when sitting for CHF, energy conservation education, bilateral hand strengthening, discharge recs      Patient left right sidelying and HOB elevated with all lines intact and call button in reachEducation:      GOALS:    Multidisciplinary Problems     Occupational Therapy Goals        Problem: Occupational Therapy    Goal Priority Disciplines Outcome Interventions   Occupational Therapy Goal     OT, PT/OT Ongoing, Progressing    Description: Goals to be met by: 6/24/2022     Patient will increase functional independence with ADLs by performing:    UE Dressing with Modified [place].  LE Dressing with Modified [place].  Grooming while standing at sink with Modified [place].  Toileting from bedside commode with Modified [place] for hygiene and clothing management.   Toilet transfer to bedside commode with Modified [place].                     Time Tracking:     OT Date of Treatment: 06/15/22  OT Start Time: 1515  OT Stop Time: 1602  OT Total Time (min): 47 min    Billable Minutes:Self Care/Home Management 32  Therapeutic Exercise 15    OT/FROILAN: OT          6/15/2022

## 2022-06-16 NOTE — ASSESSMENT & PLAN NOTE
appr sx recs  Wound care  Had leaking ostomy at home, doing OK here  Recent ct abdomen 6/6 showed post operative changes without a large focal fluid collection.

## 2022-06-16 NOTE — CARE UPDATE
"Colorectal Surgery Staff Update     Ms. Hart is a 66 year old woman with a history of CAD and PCI in 1/2022, HF (EF 20%), HTN, ICD, chronic hypoxemic respiratory failure, MDD, debility, severe malnutrition, recent acute cholecystitis who underwent an open takedown of colovesical and colovaginal fistuli (two separate areas) with sigmoid colectomy, drainage of intra-abdominal abscess and end colostomy (with cholecystectomy by Dr. Epstein) on 5/9/2022, complicated by readmission for management of shortness of breath/HF exacerbation, severe malnutrition, and wound care (images of wounds reviewed). She was recently started on IV Vancomycin. She has been scheduled to see me in clinic at [facility] (work I primarily work), however, has been admitted at [facility]. Discussed care with Dr. Moncada - overall she was doing better - she is walking and was going to be discharged until these small wounds developed just outside her ostomy appliance a few days ago.     BP (!) 81/43 (BP Location: Left arm, Patient Position: Lying)   Pulse 79   Temp 97.5 °F (36.4 °C) (Oral)   Resp 18   Ht 5' 3" (1.6 m)   Wt 50.1 kg (110 lb 7.9 oz)   LMP  (LMP Unknown)   SpO2 100%   Breastfeeding No   BMI 19.57 kg/m²     Albumin 2.1 - 2.3 from 1.8  CT Reviewed, Echo reviewed     OOB on my evaluation at bedside, walking, eating now, still having intermittent nausea, ostomy productive and pink, midline and old drain wounds stable, new wounds improving - overall she is much improved than immediately post-operative    · Discussed importance of nutritional optimization, agree with Nutrition recommendations  · Continue Miralax daily - suspect at least intermittently her nausea is related to constipation - seems better when ostomy is more productive  · Unclear etiology for new wounds - agree with treatment for presumed MRSA with likely transition to oral antibiotics as they seem to be improving  · Pressure ulcer prevention and ongoing " treatment- discussed importance of offloading pressure with patient but to be careful to not put to much pressure on other parts of her body  · Continue PT/OT  · Defer management of co-morbidities  · Will ultimately defer to in-house General Surgery while admitted, however, am available as need and by phone (cell phone provided to Dr. Moncada and patient)      Rafa Cardozo MD - Staff Surgeon  Department of Colon & Rectal Surgery  [facility]

## 2022-06-16 NOTE — PROGRESS NOTES
Food & Nutrition  Education    Diet Education: Ostomy   Time Spent: 15 minutes   Learners: patient       Nutrition Education provided with handouts: Ostomy Nutrition Therapy       Comments: RD received secure message from WILL Lopez regarding patient with complaints of gas and questions regarding diet. Most recent edu provided on 5/31/2022. Pt reports has been eating fibrous foods- salads, broccoli, bananas, etc. Also explains she has been sipping through a straw. Dicussed diet. Pt without questions. Highlighted sections of importance for patient.  Will continue to monitor.     All questions and concerns answered. Dietitian's contact information provided.       Follow-Up:Yes   Please Re-consult as needed        Thanks!

## 2022-06-16 NOTE — SUBJECTIVE & OBJECTIVE
Interval History:  She has more drainage from abdominal lesions and states they are painful.  No fever, WBC normal (low, at baseline).  Has had more output from ostomy as well.  She is ambulating well.    Review of Systems   Constitutional:  Negative for chills and fever.   Respiratory:  Negative for cough and shortness of breath.    Cardiovascular:  Negative for chest pain and palpitations.   Skin:         Painful draining lesions scattered over abdomen.     Objective:     Vital Signs (Most Recent):  Temp: 97.9 °F (36.6 °C) (06/16/22 0739)  Pulse: 84 (06/16/22 1149)  Resp: 16 (06/16/22 1516)  BP: (!) 91/53 (06/16/22 1149)  SpO2: 99 % (06/16/22 1149)   Vital Signs (24h Range):  Temp:  [97.9 °F (36.6 °C)-99.2 °F (37.3 °C)] 97.9 °F (36.6 °C)  Pulse:  [81-93] 84  Resp:  [14-18] 16  SpO2:  [93 %-99 %] 99 %  BP: (86-92)/(49-53) 91/53     Weight: 50.1 kg (110 lb 7.9 oz)  Body mass index is 19.57 kg/m².  No intake or output data in the 24 hours ending 06/16/22 1608   Physical Exam  Cardiovascular:      Rate and Rhythm: Normal rate and regular rhythm.      Heart sounds: No murmur heard.    No gallop.   Pulmonary:      Effort: Pulmonary effort is normal.      Breath sounds: Normal breath sounds.   Abdominal:      Comments: Midline incision with areas of dehiscence.  Dressing right side of abdomen with drainage noted.  Multiple red pustules scattered over abdomen.   Neurological:      Mental Status: She is alert.       Significant Labs: All pertinent labs within the past 24 hours have been reviewed.    Significant Imaging: I have reviewed all pertinent imaging results/findings within the past 24 hours.

## 2022-06-16 NOTE — PLAN OF CARE
VSS and afebrile, AAOx4. Dressings monitored closely. Midline incision dressing CDI. Sacral dressing changed during shift. Colostomy care performed PRN. Pain controlled with PRN medications. Plan of care reviewed with patient. Purposeful rounding done, call light at bed side, bed at lowest position, brakes on, non-skid socks on, will continue to monitor.     Problem: Adult Inpatient Plan of Care  Goal: Optimal Comfort and Wellbeing  Outcome: Ongoing, Progressing     Problem: Impaired Wound Healing  Goal: Optimal Wound Healing  Outcome: Ongoing, Progressing     Problem: Fall Injury Risk  Goal: Absence of Fall and Fall-Related Injury  Outcome: Ongoing, Progressing     Problem: Skin Injury Risk Increased  Goal: Skin Health and Integrity  Outcome: Ongoing, Progressing

## 2022-06-16 NOTE — PLAN OF CARE
Problem: Physical Therapy  Goal: Physical Therapy Goal  Description: Goals to be met by: 22    Patient will increase functional independence with mobility by performin. TUG in less than 12 sec to indicate not at increased risk for falls.   2. Gait x 150 feet with supervision with rollator or no AD.-MET 22  3. Ascend/descend 4 step(s) with no AD and BHR. -MET 22    Outcome: Ongoing, Progressing   Amb'd ~500' w/ Supervision w/ rollator, asc/desc 8 steps w/ 2 rails and CGA. Recommend HHPT and family assistance as needed.

## 2022-06-16 NOTE — PT/OT/SLP PROGRESS
Occupational Therapy   Treatment    Name: Odette Hart  MRN: [id number]  Admitting Diagnosis:  Acute on chronic congestive heart failure       Recommendations:     Discharge Recommendations: home health OT, home health PT (Family/friend assist)  Discharge Equipment Recommendations:  rollator, colostomy/ostomy supplies  Barriers to discharge:   (Medical needs)    Assessment:     Odette Hart is a 66 y.o. female with a medical diagnosis of Acute on chronic congestive heart failure.  She presents alert and agreeable to participating in OT tx session.  Pt participated in tx session focusing on education and training on positioning for pressure relief and cardiopulmonary function.  Pt is often sitting with LEs in dependent position and needing assistance for understanding how to position herself with head/chest elevated, feet elevated and sacral/coccyx pressure relief when sitting in chair.  Pt resistant at first to attempting positioning but once positioned in recliner in slight left sidelying with head/chest elevated and feet elevated, pt reporting that she does feel better and able to tolerate.  Pt does have a recliner as part of her sofa so she can reproduce this positioning for comfort and for edema management of LEs.  Performance deficits affecting function are weakness, impaired endurance, impaired self care skills, impaired functional mobilty, gait instability, impaired balance, impaired skin, edema, decreased coordination, decreased lower extremity function, impaired cardiopulmonary response to activity.     Rehab Prognosis:  Fair; patient would benefit from acute skilled OT services to address these deficits and reach maximum level of function.       Plan:     Patient to be seen 3 x/week to address the above listed problems via self-care/home management, therapeutic activities, therapeutic exercises  · Plan of Care Expires: 06/24/22  · Plan of Care Reviewed with: patient    Subjective  "    Pain/Comfort:  · Pain Rating 1:  (Pain in lower back and "tailbone".)  · Location - Side 1: Bilateral  · Location - Orientation 1: generalized  · Location 1:  (Lower back and sacral/coccyx area)  · Pain Addressed 1: Reposition, Nurse notified  · Pain Rating Post-Intervention 1:  (Pt reporting increased comfort at end of tx session.)    Objective:     Communicated with: Kade otero and FRANCIS Larson, prior to session.  Patient found up in chair with peripheral IV, colostomy, telemetry upon OT entry to room.    General Precautions: Standard, fall   Orthopedic Precautions:N/A   Braces: N/A  Respiratory Status: Room air     Occupational Performance:     Bed Mobility:    · N/A    Functional Mobility/Transfers:  · Functional Mobility: Supervision<>MOd I  · Pt educated and trained in positioning for pressure relief and cardiopulmonary function when sitting in chair.    Activities of Daily Living:  · Pt already performed self care tasks today.  Nurse reporting that pt did recall LB dressing technique pt trained on in previous OT tx sessions.     [facility] 6 Click ADL: 19    Treatment & Education:  Role of OT, POC, positioning when upright for pressure relief of butocks and keeping head/chest elevated and bilateral LEs elevated, edema management techniques, discharge recs    Patient left in recliner in partial left sidelying, head/chest and feet elevated with all lines intact, call button in reach and Kade otero notifiedEducation:      GOALS:   Multidisciplinary Problems     Occupational Therapy Goals        Problem: Occupational Therapy    Goal Priority Disciplines Outcome Interventions   Occupational Therapy Goal     OT, PT/OT Ongoing, Progressing    Description: Goals to be met by: 6/24/2022     Patient will increase functional independence with ADLs by performing:    UE Dressing with Modified [place].  LE Dressing with Modified [place].  Grooming while standing at sink with Modified " [place].  Toileting from bedside commode with Modified [place] for hygiene and clothing management.   Toilet transfer to bedside commode with Modified [place].                     Time Tracking:     OT Date of Treatment: 06/16/22  OT Start Time: 1716  OT Stop Time: 1736  OT Total Time (min): 20 min    Billable Minutes:Self Care/Home Management 20    OT/FROILAN: OT          6/16/2022

## 2022-06-16 NOTE — PROGRESS NOTES
"[facility] Medicine  Progress Note    Patient Name: Odette Hart  MRN: [id number]  Patient Class: IP- Inpatient   Admission Date: 6/8/2022  Length of Stay: 8 days  Attending Physician: Elina Moncada MD  Primary Care Provider: Braxton Barragan MD        Subjective:     Principal Problem:Acute on chronic congestive heart failure        HPI:  From H&P by Martha Hameed PA:  "Ms. Odette Hart is a 66 y.o. 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 She notes sudden onset a few days ago, with worsening tonight. She states this is worse with exertion. She also notes pain around her colostomy site over the past several days. She was seen in the ED due to this pain on 6/6/22 and was diagnosed with a UTI, and discharged on Macrobid. She states she had two episodes of emesis yesterday. She was evaluated in the ED with labs showing anemia with H&H of 10.9/33.1, ALP of 138, BNP of >4900. CXR showed prominent pulmonary vasculature, with interstitial and patchy alveolar infiltrates, and without pleural effusion. She was treated in the ED with 40 mg IV lasix. She was admitted to inpatient status."      Overview/Hospital Course:  Patient diuresed with improvement in her shortness of breath and lower extremity swelling.  Able to tolerate her diet.  She was seen by surgery and wound care ordered to evaluate the ostomy and midline incision with dehisced areas.  In addition to dehiscence the wound care nurse noted multiple pustular " lesions scattered on the patient's buttocks and abdomen that were new.  She was started on IV vancomycin on 6/15.      Interval History:  She has more drainage from abdominal lesions and states they are painful.  No fever, WBC normal (low, at baseline).  Has had more output from ostomy as well.  She is ambulating well.    Review of Systems   Constitutional:  Negative for chills and fever.   Respiratory:  Negative for cough and shortness of breath.    Cardiovascular:  Negative for chest pain and palpitations.   Skin:         Painful draining lesions scattered over abdomen.     Objective:     Vital Signs (Most Recent):  Temp: 97.9 °F (36.6 °C) (06/16/22 0739)  Pulse: 84 (06/16/22 1149)  Resp: 16 (06/16/22 1516)  BP: (!) 91/53 (06/16/22 1149)  SpO2: 99 % (06/16/22 1149)   Vital Signs (24h Range):  Temp:  [97.9 °F (36.6 °C)-99.2 °F (37.3 °C)] 97.9 °F (36.6 °C)  Pulse:  [81-93] 84  Resp:  [14-18] 16  SpO2:  [93 %-99 %] 99 %  BP: (86-92)/(49-53) 91/53     Weight: 50.1 kg (110 lb 7.9 oz)  Body mass index is 19.57 kg/m².  No intake or output data in the 24 hours ending 06/16/22 1608   Physical Exam  Cardiovascular:      Rate and Rhythm: Normal rate and regular rhythm.      Heart sounds: No murmur heard.    No gallop.   Pulmonary:      Effort: Pulmonary effort is normal.      Breath sounds: Normal breath sounds.   Abdominal:      Comments: Midline incision with areas of dehiscence.  Dressing right side of abdomen with drainage noted.  Multiple red pustules scattered over abdomen.   Neurological:      Mental Status: She is alert.       Significant Labs: All pertinent labs within the past 24 hours have been reviewed.    Significant Imaging: I have reviewed all pertinent imaging results/findings within the past 24 hours.      Assessment/Plan:      * Acute on chronic congestive heart failure  Acute on chronic combines systolic and diastolic HF   Echo  5/24/2022  Summary  · The left ventricle is severely enlarged with eccentric  hypertrophy and severely decreased systolic function.  · Severe left atrial enlargement.  · Grade III left ventricular diastolic dysfunction.  · Normal right ventricular size with normal right ventricular systolic function.  · Mild-to-moderate mitral regurgitation.  · Mild tricuspid regurgitation.  · There is mild pulmonary hypertension.  - CHF pathways initiated   - monitor during diuresis     -  20 mg lasix oral daily with 12.5 mg aldactone    - clinically euvolemic      Cellulitis of abdominal wall  Patient with new eruption of pustules over her abdomen.  Wound care nurse had noted coccygeal DTI on presentation as well as 2 pustules on her buttocks.  Now with new pustules on her abdomen.  Cause of this is unclear, although appears to be a Staph infection.  Started vancomycin.  Surgery following, unclear how this is related to the recent surgery/ostomy, although there is some wound dehiscence that looks unrelated to the skin infection.      Superficial dehiscence of operation wound  appr sx recs  Wound care  Had leaking ostomy at home, doing OK here  Recent ct abdomen 6/6 showed post operative changes without a large focal fluid collection.      Increased nutritional needs        CAD (coronary artery disease)  - continue Plavix , asa, zetia  - 1/24/2022: [facility]: Cath: LAD: Proximal stent patent. LCX: Proximal 80%. LCX: Dominant. Moderate disease. LCX: HEVER 2.75 x 18 mm. Distal dissection. HEVER 2.75 x 22 mm.       Debility  - fall precautions   - SNF vs HH    Anemia  - H&H appear to be at approximate baseline   - monitor       Primary hypertension  - mildly hypotensive with preserved MAP at time of admission   - home meds: carvedilol 3.125 mg BID, lasix 20 mg QD, spironolactone 12.5 mg QD  - monitor     Ischemic cardiomyopathy  - history noted   - s/p ICD placement         VTE Risk Mitigation (From admission, onward)         Ordered     enoxaparin injection 40 mg  Daily         06/08/22 1122     IP VTE HIGH RISK  PATIENT  Once         06/08/22 0253     Place sequential compression device  Until discontinued         06/08/22 0253     Place sequential compression device  Until discontinued         06/08/22 0252                        Elina Lyons MD  Department of Hospital Medicine   [facility]

## 2022-06-16 NOTE — PLAN OF CARE
Problem: Occupational Therapy  Goal: Occupational Therapy Goal  Description: Goals to be met by: 6/24/2022     Patient will increase functional independence with ADLs by performing:    UE Dressing with Modified [place].  LE Dressing with Modified [place].  Grooming while standing at sink with Modified [place].  Toileting from bedside commode with Modified [place] for hygiene and clothing management.   Toilet transfer to bedside commode with Modified [place].    Outcome: Ongoing, Progressing   Pt is grossly SBA<>Supervision at ADL and ADL mobility.  Pt educated on positioning with head/chest elevated and LE elevated when lying in bedor sitting and also avoiding bending over to floor level to reach feet for LB self care due to CHF.  Pt trained in adaptive techniques for LB self care.   Pt issued Theraputty and training in bilateral hand strengthening HEP.

## 2022-06-16 NOTE — PT/OT/SLP PROGRESS
Physical Therapy Treatment    Patient Name:  Odette Hart   MRN:  [id number]    Recommendations:     Discharge Recommendations:  home health PT, home health OT (family assistance as needed)   Discharge Equipment Recommendations: colostomy/ostomy supplies   Barriers to discharge: None    Assessment:     Odette Hart is a 66 y.o. female admitted with a medical diagnosis of Acute on chronic congestive heart failure.  She presents with the following impairments/functional limitations:  weakness, impaired endurance, impaired self care skills, impaired functional mobilty, gait instability, decreased lower extremity function, pain, impaired skin, edema, impaired cardiopulmonary response to activity ;pt with good mobility today, able to asc/desc 8 steps w/ 2 rails w/ CGA, ambulating around unit, though req's brief standing rests 2/2 SOB, though O2:97% on RA.    Rehab Prognosis: Good and Fair; patient would benefit from acute skilled PT services to address these deficits and reach maximum level of function.    Recent Surgery: * No surgery found *      Plan:     During this hospitalization, patient to be seen 3 x/week to address the identified rehab impairments via gait training, therapeutic activities, therapeutic exercises, neuromuscular re-education and progress toward the following goals:    · Plan of Care Expires:  06/22/22    Subjective     Chief Complaint: pt c/o nausea mostly  Patient/Family Comments/goals: pt agreeable to session, wants to sit up near window in waiting area of 3rd floor after therapy.   Pain/Comfort:  · Pain Rating 1:  (pt reporting some buttocks pain, did not rate)  · Pain Rating 2: 6/10  · Location 2:  (colostomy area)  · Pain Addressed 2: Reposition, Distraction  · Pain Rating Post-Intervention 2: 6/10      Objective:     Communicated with nurse prior to session.  Patient found left sidelying with peripheral IV, colostomy, telemetry upon PT entry to room.     General Precautions:  Standard, fall   Orthopedic Precautions:N/A   Braces: N/A  Respiratory Status: Room air     Functional Mobility:  · Bed Mobility:     · Supine to Sit: modified independence  · Transfers:     · Sit to Stand:  modified independence and supervision with no AD  · Gait: amb'd ~500' w/ rollator and Supervision   · Stairs: pt asc/desc 8 steps w/ 2 rails and CGA      AM-PAC 6 CLICK MOBILITY  Turning over in bed (including adjusting bedclothes, sheets and blankets)?: 4  Sitting down on and standing up from a chair with arms (e.g., wheelchair, bedside commode, etc.): 4  Moving from lying on back to sitting on the side of the bed?: 4  Moving to and from a bed to a chair (including a wheelchair)?: 4  Need to walk in hospital room?: 3  Climbing 3-5 steps with a railing?: 3  Basic Mobility Total Score: 22       Therapeutic Activities and Exercises:   perf'd standing LE exs' of heelraises, hip flex, hip abd, hs curls x 5 reps ea. (rests b/w sets).    Patient left sitting up in chair in waiting area w/ rollator with all lines intact and nurse notified..    GOALS:   Multidisciplinary Problems     Physical Therapy Goals        Problem: Physical Therapy    Goal Priority Disciplines Outcome Goal Variances Interventions   Physical Therapy Goal     PT, PT/OT Ongoing, Progressing     Description: Goals to be met by: 22    Patient will increase functional independence with mobility by performin. TUG in less than 12 sec to indicate not at increased risk for falls.   2. Gait x 150 feet with supervision with rollator or no AD.  3. Ascend/descend 4 step(s) with no AD and BHR.                    Time Tracking:     PT Received On: 22  PT Start Time: 1033     PT Stop Time: 1100  PT Total Time (min): 27 min     Billable Minutes: Gait Training 18 and Therapeutic Exercise 9    Treatment Type: Treatment  PT/PTA: PTA     PTA Visit Number: 4     2022

## 2022-06-16 NOTE — PROGRESS NOTES
Patient reviewed, renal function stable, cultures reviewed, no new levels, continue current therapy; Next levels due: 6/17 @2525

## 2022-06-17 NOTE — PT/OT/SLP PROGRESS
Occupational Therapy      Patient Name:  Odette Hart   MRN:  [id number]    Patient not seen today secondary to Patient fatigue. Will follow-up per POC.    6/17/2022

## 2022-06-17 NOTE — PROGRESS NOTES
Pharmacokinetic Assessment Follow Up: IV Vancomycin    Vancomycin serum concentration assessment(s):    The trough level was drawn correctly and can be used to guide therapy at this time. The measurement is below the desired definitive target range of 10 to 20 mcg/mL.    Vancomycin Regimen Plan:    Change regimen to Vancomycin 1250 mg IV every 12 hours with next serum trough concentration measured at 1430 prior to 1500 dose on 6/19    Drug levels (last 3 results):  Recent Labs   Lab Result Units 06/17/22  1404   Vancomycin-Trough ug/mL 2.8*       Pharmacy will continue to follow and monitor vancomycin.    Please contact pharmacy at extension [phone] for questions regarding this assessment.    Thank you for the consult,   Jen Swann       Patient brief summary:  Odette Hart is a 66 y.o. female initiated on antimicrobial therapy with IV Vancomycin for treatment of skin & soft tissue infection    The patient's current regimen is Vancomycin 1250mg IVPB every 12 hours    Drug Allergies:   Review of patient's allergies indicates:   Allergen Reactions    Augmentin [amoxicillin-pot clavulanate] Swelling     Not allergic to amoxicillin just a derivative of augmentin    Lisinopril Other (See Comments)     Fluid around heart    Losartan Other (See Comments)     High potassium    Shellfish containing products Anaphylaxis     Pt.states she is allergic to SEAFOOD since the age of 13    Levaquin [levofloxacin] Other (See Comments)     Very bad joint pain    Clindamycin Palpitations     Chest pain       Actual Body Weight:   50.1kg    Renal Function:   Estimated Creatinine Clearance: 63.4 mL/min (based on SCr of 0.7 mg/dL).,     Dialysis Method (if applicable):  N/A    CBC (last 72 hours):  No results for input(s): WHITE BLOOD CELL COUNT, HEMOGLOBIN, HEMATOCRIT, PLATELETS, GRAN%, LYMPH%, MONO%, EOSINOPHIL%, BASOPHIL%, DIFFERENTIAL METHOD in the last 72 hours.    Metabolic Panel (last 72 hours):  No results for input(s):  SODIUM, POTASSIUM, CHLORIDE, CO2, GLUCOSE, BUN BLD, CREATININE, ALBUMIN, BILIRUBIN TOTAL, ALK PHOS, AST, ALT, MAGNESIUM, PHOSPHORUS in the last 72 hours.    Vancomycin Administrations:  vancomycin given in the last 96 hours                     vancomycin in dextrose 5 % 1 gram/250 mL IVPB 1,000 mg (mg) 1,000 mg New Bag 06/16/22 1512    vancomycin 1.25 g in dextrose 5% 250 mL IVPB (ready to mix) (mg) 1,250 mg New Bag 06/15/22 1422                    Microbiologic Results:  Microbiology Results (last 7 days)       ** No results found for the last 168 hours. **

## 2022-06-17 NOTE — ASSESSMENT & PLAN NOTE
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  Required RLQ drain for some time.  Recent ct abdomen 6/6 showed post operative changes without a large focal fluid collection.

## 2022-06-17 NOTE — ASSESSMENT & PLAN NOTE
Patient with new eruption of pustules over her abdomen.  Wound care nurse had noted coccygeal DTI on presentation as well as 2 pustules on her buttocks.  Now with new pustules on her abdomen.  Cause of this is unclear, although appears to be a Staph infection.  Started vancomycin.  Surgery following, unclear how this is related to the recent surgery/ostomy, although there is some wound dehiscence that looks unrelated to the skin infection.  Dr. Cardozo was the CRS on her case and visited her yesterday, see his note.  Having good improvement on the antibiotics with daily wound care.

## 2022-06-17 NOTE — SUBJECTIVE & OBJECTIVE
Interval History:  She is wondering if her carvedilol should be increased as she says she feels better on a higher dose.  We discussed her low blood pressure.  She has been asymptomatic from low BP and HR is well controlled, so probably should hold off on increasing medication for now.    Review of Systems   Constitutional:  Negative for chills and fever.   Respiratory:  Negative for cough and shortness of breath.    Cardiovascular:  Negative for chest pain and palpitations.   Skin:         Painful draining lesions scattered over abdomen.     Objective:     Vital Signs (Most Recent):  Temp: 98.1 °F (36.7 °C) (06/17/22 1526)  Pulse: 76 (06/17/22 1526)  Resp: 18 (06/17/22 1526)  BP: (!) 86/45 (06/17/22 1526)  SpO2: 98 % (06/17/22 1526)   Vital Signs (24h Range):  Temp:  [97.9 °F (36.6 °C)-98.9 °F (37.2 °C)] 98.1 °F (36.7 °C)  Pulse:  [71-89] 76  Resp:  [18-19] 18  SpO2:  [96 %-98 %] 98 %  BP: (84-99)/(45-56) 86/45     Weight: 50.1 kg (110 lb 7.9 oz)  Body mass index is 19.57 kg/m².    Intake/Output Summary (Last 24 hours) at 6/17/2022 1641  Last data filed at 6/17/2022 0500  Gross per 24 hour   Intake 120 ml   Output --   Net 120 ml      Physical Exam  Cardiovascular:      Rate and Rhythm: Normal rate and regular rhythm.      Heart sounds: No murmur heard.    No gallop.   Pulmonary:      Effort: Pulmonary effort is normal.      Breath sounds: Normal breath sounds.   Abdominal:      Comments: Midline incision with areas of dehiscence.  Dressing right side of abdomen with drainage noted.  Multiple red pustules scattered over abdomen, improving.   Neurological:      Mental Status: She is alert.       Significant Labs: All pertinent labs within the past 24 hours have been reviewed.    Significant Imaging: I have reviewed all pertinent imaging results/findings within the past 24 hours.

## 2022-06-18 NOTE — PROGRESS NOTES
"[facility] Surg [facility] Medicine  Progress Note    Patient Name: Odette Hart  MRN: [id number]  Patient Class: IP- Inpatient   Admission Date: 6/8/2022  Length of Stay: 10 days  Attending Physician: Elina Moncada MD  Primary Care Provider: Braxton Barragan MD        Subjective:     Principal Problem:Acute on chronic congestive heart failure        HPI:  From H&P by Martha Hameed PA:  "Ms. Odette Hart is a 66 y.o. 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 She notes sudden onset a few days ago, with worsening tonight. She states this is worse with exertion. She also notes pain around her colostomy site over the past several days. She was seen in the ED due to this pain on 6/6/22 and was diagnosed with a UTI, and discharged on Macrobid. She states she had two episodes of emesis yesterday. She was evaluated in the ED with labs showing anemia with H&H of 10.9/33.1, ALP of 138, BNP of >4900. CXR showed prominent pulmonary vasculature, with interstitial and patchy alveolar infiltrates, and without pleural effusion. She was treated in the ED with 40 mg IV lasix. She was admitted to inpatient status."      Overview/Hospital Course:  Patient diuresed with improvement in her shortness of breath and lower extremity swelling.  Able to tolerate her diet.  She was seen by surgery and wound care ordered to evaluate the ostomy and midline incision with dehisced areas.  In addition to dehiscence the wound care nurse noted multiple " pustular lesions scattered on the patient's buttocks and abdomen that were new.  She was started on IV vancomycin on 6/15 with some improvement.  Surgery re-evaluated and recommended continued wound care and antibiotics.  As she will need daily wound care SNF was consulted.      Interval History:  Doing well, drainage from lesions a little better.  Needs help from nurses/wound care with dressing changes as the ostomy bag adheres to some of the lesions.  She has been on low fiber diet and has less gap produced.    Review of Systems   Constitutional:  Negative for chills and fever.   Respiratory:  Negative for cough and shortness of breath.    Cardiovascular:  Negative for chest pain and palpitations.   Skin:         Painful draining lesions scattered over abdomen.     Objective:     Vital Signs (Most Recent):  Temp: 98 °F (36.7 °C) (06/18/22 0827)  Pulse: 68 (06/18/22 0827)  Resp: 18 (06/18/22 0827)  BP: (!) 97/51 (06/18/22 0827)  SpO2: 99 % (06/18/22 0827)   Vital Signs (24h Range):  Temp:  [97.7 °F (36.5 °C)-98.5 °F (36.9 °C)] 98 °F (36.7 °C)  Pulse:  [68-82] 68  Resp:  [14-18] 18  SpO2:  [95 %-99 %] 99 %  BP: ()/(44-57) 97/51     Weight: 50.1 kg (110 lb 7.9 oz)  Body mass index is 19.57 kg/m².    Intake/Output Summary (Last 24 hours) at 6/18/2022 0852  Last data filed at 6/18/2022 0800  Gross per 24 hour   Intake 960 ml   Output --   Net 960 ml      Physical Exam  Cardiovascular:      Rate and Rhythm: Normal rate and regular rhythm.      Heart sounds: No murmur heard.    No gallop.   Pulmonary:      Effort: Pulmonary effort is normal.      Breath sounds: Normal breath sounds.   Abdominal:      Comments: Midline incision with areas of dehiscence.  Dressings over multiple pustular lesions including adjacent to ostomy bag.  No strike-through noted today, improving.   Neurological:      Mental Status: She is alert.       Significant Labs: All pertinent labs within the past 24 hours have been  reviewed.    Significant Imaging: I have reviewed all pertinent imaging results/findings within the past 24 hours.      Assessment/Plan:      * Acute on chronic congestive heart failure  Acute on chronic combines systolic and diastolic HF   Echo  5/24/2022  Summary  · The left ventricle is severely enlarged with eccentric hypertrophy and severely decreased systolic function.  · Severe left atrial enlargement.  · Grade III left ventricular diastolic dysfunction.  · Normal right ventricular size with normal right ventricular systolic function.  · Mild-to-moderate mitral regurgitation.  · Mild tricuspid regurgitation.  · There is mild pulmonary hypertension.  - CHF pathways initiated   - monitor during diuresis     -  20 mg lasix oral daily with 12.5 mg aldactone    - clinically euvolemic      Cellulitis of abdominal wall  Patient with new eruption of pustules over her abdomen.  Wound care nurse had noted coccygeal DTI on presentation as well as 2 pustules on her buttocks.  Now with new pustules on her abdomen.  Cause of this is unclear, although appears to be a Staph infection.  Started vancomycin.  Surgery following, unclear how this is related to the recent surgery/ostomy, although there is some wound dehiscence that looks unrelated to the skin infection.  Dr. Cardozo was the CRS on her case and visited her yesterday, see his note.  Having good improvement on the antibiotics with daily wound care.      Superficial dehiscence of operation wound  appr sx recs  Wound care  Had leaking ostomy at home, doing OK here  Had both a colo-vaginal and colo-vesical fistula prior to surgery, repaired.  Required RLQ drain for some time.  Recent ct abdomen 6/6 showed post operative changes without a large focal fluid collection.      Increased nutritional needs        CAD (coronary artery disease)  - continue Plavix , asa, zetia  - 1/24/2022: [facility]: Cath: LAD: Proximal stent patent. LCX: Proximal 80%. LCX: Dominant. Moderate  disease. LCX: HEVER 2.75 x 18 mm. Distal dissection. HEVER 2.75 x 22 mm.       Debility  - fall precautions   - She will need SNF due to need for daily wound care.    Anemia  - H&H appear to be at approximate baseline   - monitor       Primary hypertension  - mildly hypotensive with preserved MAP at time of admission   - home meds: carvedilol 3.125 mg BID, lasix 20 mg QD, spironolactone 12.5 mg QD  - monitor     Ischemic cardiomyopathy  - history noted   - s/p ICD placement         VTE Risk Mitigation (From admission, onward)         Ordered     enoxaparin injection 40 mg  Daily         06/08/22 1122     IP VTE HIGH RISK PATIENT  Once         06/08/22 0253     Place sequential compression device  Until discontinued         06/08/22 0253     Place sequential compression device  Until discontinued         06/08/22 0252                        Elina Lyons MD  Department of Hospital Medicine   [facility] ([facility]

## 2022-06-18 NOTE — SUBJECTIVE & OBJECTIVE
Interval History:  Doing well, drainage from lesions a little better.  Needs help from nurses/wound care with dressing changes as the ostomy bag adheres to some of the lesions.  She has been on low fiber diet and has less gap produced.    Review of Systems   Constitutional:  Negative for chills and fever.   Respiratory:  Negative for cough and shortness of breath.    Cardiovascular:  Negative for chest pain and palpitations.   Skin:         Painful draining lesions scattered over abdomen.     Objective:     Vital Signs (Most Recent):  Temp: 98 °F (36.7 °C) (06/18/22 0827)  Pulse: 68 (06/18/22 0827)  Resp: 18 (06/18/22 0827)  BP: (!) 97/51 (06/18/22 0827)  SpO2: 99 % (06/18/22 0827)   Vital Signs (24h Range):  Temp:  [97.7 °F (36.5 °C)-98.5 °F (36.9 °C)] 98 °F (36.7 °C)  Pulse:  [68-82] 68  Resp:  [14-18] 18  SpO2:  [95 %-99 %] 99 %  BP: ()/(44-57) 97/51     Weight: 50.1 kg (110 lb 7.9 oz)  Body mass index is 19.57 kg/m².    Intake/Output Summary (Last 24 hours) at 6/18/2022 0852  Last data filed at 6/18/2022 0800  Gross per 24 hour   Intake 960 ml   Output --   Net 960 ml      Physical Exam  Cardiovascular:      Rate and Rhythm: Normal rate and regular rhythm.      Heart sounds: No murmur heard.    No gallop.   Pulmonary:      Effort: Pulmonary effort is normal.      Breath sounds: Normal breath sounds.   Abdominal:      Comments: Midline incision with areas of dehiscence.  Dressings over multiple pustular lesions including adjacent to ostomy bag.  No strike-through noted today, improving.   Neurological:      Mental Status: She is alert.       Significant Labs: All pertinent labs within the past 24 hours have been reviewed.    Significant Imaging: I have reviewed all pertinent imaging results/findings within the past 24 hours.

## 2022-06-18 NOTE — PLAN OF CARE
Problem: Adult Inpatient Plan of Care  Goal: Optimal Comfort and Wellbeing  6/18/2022 0337 by Evens Newsome RN  Outcome: Ongoing, Progressing  6/18/2022 0314 by Evens Newsome RN  Outcome: Ongoing, Progressing     Problem: Impaired Wound Healing  Goal: Optimal Wound Healing  6/18/2022 0337 by Evens Newsome RN  Outcome: Ongoing, Progressing  6/18/2022 0314 by Evens Newsome RN  Outcome: Ongoing, Progressing     Problem: Fall Injury Risk  Goal: Absence of Fall and Fall-Related Injury  Outcome: Ongoing, Progressing     Problem: Skin Injury Risk Increased  Goal: Skin Health and Integrity  6/18/2022 0337 by Evens Newsome RN  Outcome: Ongoing, Progressing  6/18/2022 0314 by Evens Newsome RN  Outcome: Ongoing, Progressing

## 2022-06-19 NOTE — PROGRESS NOTES
"[facility] Surg [facility] Medicine  Progress Note    Patient Name: Odette Hart  MRN: [id number]  Patient Class: IP- Inpatient   Admission Date: 6/8/2022  Length of Stay: 11 days  Attending Physician: Elina Moncada MD  Primary Care Provider: Braxton Barragan MD        Subjective:     Principal Problem:Acute on chronic congestive heart failure        HPI:  From H&P by Martha Hameed PA:  "Ms. Odette Hart is a 66 y.o. female, with PMH of HFrEF (EF 20%), CAD s/p 2x HEVER placement in 1/2022 (on Plavix), Ischemic Cardiomyopathy s/p ICD placement, RBBB, HTN, calculus cholecystitis s/p recent cholecystectomy, colovesicular fistula 2/2 complicated diverticulitis s/p cystoscopy with BL ureteral catheters, open sigmoid colon resection, appendectomy, and creation of end colostomy on 5/9/22, anemia, chronic hypoxemic respiratory failure (not on home O2), MDD, Debility, who presented to [facility] ED on 6/8/22 due to shortness of breath. She notes sudden onset a few days ago, with worsening tonight. She states this is worse with exertion. She also notes pain around her colostomy site over the past several days. She was seen in the ED due to this pain on 6/6/22 and was diagnosed with a UTI, and discharged on Macrobid. She states she had two episodes of emesis yesterday. She was evaluated in the ED with labs showing anemia with H&H of 10.9/33.1, ALP of 138, BNP of >4900. CXR showed prominent pulmonary vasculature, with interstitial and patchy alveolar infiltrates, and without pleural effusion. She was treated in the ED with 40 mg IV lasix. She was admitted to inpatient status."      Overview/Hospital Course:  Patient diuresed with improvement in her shortness of breath and lower extremity swelling.  Able to tolerate her diet.  She was seen by surgery and wound care ordered to evaluate the ostomy and midline incision with dehisced areas.  In addition to dehiscence the wound care nurse noted multiple " pustular lesions scattered on the patient's buttocks and abdomen that were new.  She was started on IV vancomycin on 6/15 with some improvement.  Surgery re-evaluated and recommended continued wound care and antibiotics.  As she will need daily wound care SNF was consulted.      Interval History:  Doing better overall, lesions drying up.  One is under the adhesive of the ostomy bag and this one is taking longer to heal.    Review of Systems   Constitutional:  Negative for chills and fever.   Respiratory:  Negative for cough and shortness of breath.    Cardiovascular:  Positive for leg swelling. Negative for chest pain and palpitations.   Skin:         Painful draining lesions scattered over abdomen.     Objective:     Vital Signs (Most Recent):  Temp: 97.7 °F (36.5 °C) (06/19/22 1153)  Pulse: 65 (06/19/22 1153)  Resp: 18 (06/19/22 1153)  BP: (!) 95/50 (06/19/22 1153)  SpO2: 99 % (06/19/22 1153)   Vital Signs (24h Range):  Temp:  [97.7 °F (36.5 °C)-98.5 °F (36.9 °C)] 97.7 °F (36.5 °C)  Pulse:  [61-68] 65  Resp:  [18] 18  SpO2:  [95 %-100 %] 99 %  BP: ()/(44-55) 95/50     Weight: 51.4 kg (113 lb 5.8 oz)  Body mass index is 20.08 kg/m².    Intake/Output Summary (Last 24 hours) at 6/19/2022 1357  Last data filed at 6/19/2022 1200  Gross per 24 hour   Intake 1320 ml   Output --   Net 1320 ml      Physical Exam  Cardiovascular:      Rate and Rhythm: Normal rate and regular rhythm.      Heart sounds: No murmur heard.    No gallop.   Pulmonary:      Effort: Pulmonary effort is normal.      Breath sounds: Normal breath sounds.   Abdominal:      Comments: Midline incision with areas of dehiscence.  Dressings over multiple pustular lesions including adjacent to ostomy bag.  No strike-through noted today, improving.   Neurological:      Mental Status: She is alert.       Significant Labs: All pertinent labs within the past 24 hours have been reviewed.    Significant Imaging: I have reviewed all pertinent imaging  results/findings within the past 24 hours.      Assessment/Plan:      * Acute on chronic congestive heart failure  Acute on chronic combines systolic and diastolic HF   Echo  5/24/2022  Summary  · The left ventricle is severely enlarged with eccentric hypertrophy and severely decreased systolic function.  · Severe left atrial enlargement.  · Grade III left ventricular diastolic dysfunction.  · Normal right ventricular size with normal right ventricular systolic function.  · Mild-to-moderate mitral regurgitation.  · Mild tricuspid regurgitation.  · There is mild pulmonary hypertension.  - CHF pathways initiated   - monitor during diuresis     -  20 mg lasix oral daily with 12.5 mg aldactone    - clinically euvolemic      Cellulitis of abdominal wall  Patient with new eruption of pustules over her abdomen.  Wound care nurse had noted coccygeal DTI on presentation as well as 2 pustules on her buttocks.  Now with new pustules on her abdomen.  Cause of this is unclear, although appears to be a Staph infection.  Started vancomycin.  Surgery following, unclear how this is related to the recent surgery/ostomy, although there is some wound dehiscence that looks unrelated to the skin infection.  Dr. Cardozo was the CRS on her case and visited her yesterday, see his note.  Having good improvement on the antibiotics with daily wound care.      Superficial dehiscence of operation wound  appr sx recs  Wound care  Had leaking ostomy at home, doing OK here  Had both a colo-vaginal and colo-vesical fistula prior to surgery, repaired.  Required RLQ drain for some time.  Recent ct abdomen 6/6 showed post operative changes without a large focal fluid collection.      Increased nutritional needs        CAD (coronary artery disease)  - continue Plavix , asa, zetia  - 1/24/2022: [facility]: Cath: LAD: Proximal stent patent. LCX: Proximal 80%. LCX: Dominant. Moderate disease. LCX: HEVER 2.75 x 18 mm. Distal dissection. HEVER 2.75 x 22 mm.        Debility  - fall precautions   - She will need SNF due to need for daily wound care.    Anemia  - H&H appear to be at approximate baseline   - monitor       Primary hypertension  - mildly hypotensive with preserved MAP at time of admission   - home meds: carvedilol 3.125 mg BID, lasix 20 mg QD, spironolactone 12.5 mg QD  - monitor     Ischemic cardiomyopathy  - history noted   - s/p ICD placement         VTE Risk Mitigation (From admission, onward)         Ordered     enoxaparin injection 40 mg  Daily         06/08/22 1122     IP VTE HIGH RISK PATIENT  Once         06/08/22 0253     Place sequential compression device  Until discontinued         06/08/22 0253     Place sequential compression device  Until discontinued         06/08/22 0252                      Elina Lyons MD  Department of Hospital Medicine   [facility] Surg ([facility])

## 2022-06-19 NOTE — PROGRESS NOTES
Pharmacokinetic Assessment Follow Up: IV Vancomycin    Vancomycin serum concentration assessment(s):    The trough level was drawn correctly and can be used to guide therapy at this time. The measurement is above the desired definitive target range of 10 to 20 mcg/mL.    Vancomycin Regimen Plan:    Change regimen to Vancomycin 750 mg IV every 12 hours with next serum trough concentration measured at 0930 prior to 4th dose on 6/21    Drug levels (last 3 results):  Recent Labs   Lab Result Units 06/17/22  1404 06/19/22  1441   Vancomycin-Trough ug/mL 2.8* 24.2*       Pharmacy will continue to follow and monitor vancomycin.    Please contact pharmacy at extension [phone] for questions regarding this assessment.    Thank you for the consult,   Hyacinth Osborne       Patient brief summary:  Odette Hart is a 66 y.o. female initiated on antimicrobial therapy with IV Vancomycin for treatment of skin & soft tissue infection    The patient's current regimen is 750 mg q12h    Drug Allergies:   Review of patient's allergies indicates:   Allergen Reactions    Augmentin [amoxicillin-pot clavulanate] Swelling     Not allergic to amoxicillin just a derivative of augmentin    Lisinopril Other (See Comments)     Fluid around heart    Losartan Other (See Comments)     High potassium    Shellfish containing products Anaphylaxis     Pt.states she is allergic to SEAFOOD since the age of 13    Levaquin [levofloxacin] Other (See Comments)     Very bad joint pain    Clindamycin Palpitations     Chest pain       Actual Body Weight:   51.4 kg    Renal Function:   CrCl cannot be calculated (Patient's most recent lab result is older than the maximum 7 days allowed.).,     Dialysis Method (if applicable):  N/A    CBC (last 72 hours):  No results for input(s): WHITE BLOOD CELL COUNT, HEMOGLOBIN, HEMATOCRIT, PLATELETS, GRAN%, LYMPH%, MONO%, EOSINOPHIL%, BASOPHIL%, DIFFERENTIAL METHOD in the last 72 hours.    Metabolic Panel (last 72  hours):  No results for input(s): SODIUM, POTASSIUM, CHLORIDE, CO2, GLUCOSE, BUN BLD, CREATININE, ALBUMIN, BILIRUBIN TOTAL, ALK PHOS, AST, ALT, MAGNESIUM, PHOSPHORUS in the last 72 hours.    Vancomycin Administrations:  vancomycin given in the last 96 hours                   vancomycin 1.25 g in dextrose 5% 250 mL IVPB (ready to mix) (mg) 1,250 mg New Bag 06/19/22 0319     1,250 mg New Bag 06/18/22 1543     1,250 mg New Bag  0400    vancomycin in dextrose 5 % 1 gram/250 mL IVPB 1,000 mg (mg) 1,000 mg New Bag 06/16/22 1512                Microbiologic Results:  Microbiology Results (last 7 days)     ** No results found for the last 168 hours. **

## 2022-06-19 NOTE — SUBJECTIVE & OBJECTIVE
Interval History:  Doing better overall, lesions drying up.  One is under the adhesive of the ostomy bag and this one is taking longer to heal.    Review of Systems   Constitutional:  Negative for chills and fever.   Respiratory:  Negative for cough and shortness of breath.    Cardiovascular:  Positive for leg swelling. Negative for chest pain and palpitations.   Skin:         Painful draining lesions scattered over abdomen.     Objective:     Vital Signs (Most Recent):  Temp: 97.7 °F (36.5 °C) (06/19/22 1153)  Pulse: 65 (06/19/22 1153)  Resp: 18 (06/19/22 1153)  BP: (!) 95/50 (06/19/22 1153)  SpO2: 99 % (06/19/22 1153)   Vital Signs (24h Range):  Temp:  [97.7 °F (36.5 °C)-98.5 °F (36.9 °C)] 97.7 °F (36.5 °C)  Pulse:  [61-68] 65  Resp:  [18] 18  SpO2:  [95 %-100 %] 99 %  BP: ()/(44-55) 95/50     Weight: 51.4 kg (113 lb 5.8 oz)  Body mass index is 20.08 kg/m².    Intake/Output Summary (Last 24 hours) at 6/19/2022 1357  Last data filed at 6/19/2022 1200  Gross per 24 hour   Intake 1320 ml   Output --   Net 1320 ml      Physical Exam  Cardiovascular:      Rate and Rhythm: Normal rate and regular rhythm.      Heart sounds: No murmur heard.    No gallop.   Pulmonary:      Effort: Pulmonary effort is normal.      Breath sounds: Normal breath sounds.   Abdominal:      Comments: Midline incision with areas of dehiscence.  Dressings over multiple pustular lesions including adjacent to ostomy bag.  No strike-through noted today, improving.   Neurological:      Mental Status: She is alert.       Significant Labs: All pertinent labs within the past 24 hours have been reviewed.    Significant Imaging: I have reviewed all pertinent imaging results/findings within the past 24 hours.

## 2022-06-20 NOTE — PT/OT/SLP PROGRESS
Occupational Therapy   Treatment    Name: Odette Hart  MRN: [id number]  Admitting Diagnosis:  Acute on chronic congestive heart failure       Recommendations:     Discharge Recommendations: home health OT, home health PT  Discharge Equipment Recommendations:  rollator, colostomy/ostomy supplies  Barriers to discharge:   (medical needs)    Assessment:     Odette Hart is a 66 y.o. female with a medical diagnosis of Acute on chronic congestive heart failure.  She presents with swelling in both ankles and feet. Performance deficits affecting function are weakness, impaired endurance, impaired self care skills, impaired functional mobilty, gait instability, impaired balance, impaired skin, edema, decreased coordination, decreased lower extremity function, impaired cardiopulmonary response to activity.  Pt agreeable to participating in therapy upon arrival to room.  Pt able to perform grooming task standing at sink with supervision.    Overall, pt tolerated therapy well; however, experienced pain in UB muscles during exercises.  Pt states she believes it is from exercising throughout the day.  No SOB observed during activity.  Pt would continues to benefit from skilled OT services to address problems listed above and maximzie independence with ADLs.  Home health is recommended upon d/c from acute care to further address deficits and help pt improve overall functional independence.         Rehab Prognosis:  Good; patient would benefit from acute skilled OT services to address these deficits and reach maximum level of function.       Plan:     Patient to be seen 3 x/week to address the above listed problems via self-care/home management, therapeutic activities, therapeutic exercises  · Plan of Care Expires: 06/24/22  · Plan of Care Reviewed with: patient    Subjective     Pt states she has been getting up on her own and going to bathroom without rollator.  Pt reports she has some difficulty getting up and down  "from toilet stating sometimes it requires extra time and effort.    Pain/Comfort:  · Pain Rating 1:  (pt reported some pain in back, but did not rate)    Objective:     Communicated with:  Patient found lying down on couch with peripheral IV, colostomy, telemetry upon OT entry to room.    General Precautions: Standard, fall   Orthopedic Precautions:N/A   Braces: N/A  Respiratory Status: Room air     Occupational Performance:     Bed Mobility:    · Not assessed 2* pt up in chair upon arrival.    Functional Mobility/Transfers:  · Sit <> Stand: Supervision, rollator x 2 trials from couch  · Functional Mobility: Pt ambulated ~30 ft in room with supervision, no AD.  · No LOB noted    Activities of Daily Living:  · Grooming: supervision for washing hands while standing at sink.      [facility] 6 Click ADL: 19    Treatment & Education:  *Pt educated on role of OT in acute care setting  *Pt ambulated within room to address coordination, endurance, and balance needed for functional mobility  *Pt performed grooming task standing at sink  *Pt performed UB exercises to provide stretch and promote increased endurance needed for ADLs: 2 sets x 10 reps per exercise; seated on couch  -Chest press  -Shoulder shrugs and circles  -Overhead claps  -"Runners"  *POC reviewed with pt        Patient left sitting up on couch with PCT presentEducation:      GOALS:   Multidisciplinary Problems     Occupational Therapy Goals        Problem: Occupational Therapy    Goal Priority Disciplines Outcome Interventions   Occupational Therapy Goal     OT, PT/OT Ongoing, Progressing    Description: Goals to be met by: 6/24/2022     Patient will increase functional independence with ADLs by performing:    UE Dressing with Modified [place].  LE Dressing with Modified [place].  Grooming while standing at sink with Modified [place].  Toileting from bedside commode with Modified [place] for hygiene and clothing management.   Toilet transfer to " bedside commode with Modified [place].                     Time Tracking:     OT Date of Treatment: 06/20/22  OT Start Time: 1005  OT Stop Time: 1021  OT Total Time (min): 16 min    Billable Minutes:Therapeutic Activity 16    OT/FROILAN: OT          6/20/2022

## 2022-06-20 NOTE — PROGRESS NOTES
ADAN called [facility] and [facility] because neither responded in [facility]. ADAN spoke with Alek at [facility] who said that the patient was still not accepted there after the second referral. ADAN had to leave a message requesting a call back at [facility].   The patient will need daily wound care which she cannot do. ADAN will speak with Ms. Tanner about going to a different SNF, which she thus far has refused to consider.

## 2022-06-20 NOTE — SUBJECTIVE & OBJECTIVE
Interval History:  She is doing well except for the lesions involving the appliance.  Has 2 lesions that are difficult to dress as they are underneath the adhesive of the bag.  Daily wound care still being done.    Review of Systems   Constitutional:  Negative for chills and fever.   Respiratory:  Negative for cough and shortness of breath.    Cardiovascular:  Positive for leg swelling. Negative for chest pain and palpitations.   Skin:         Painful draining lesions scattered over abdomen.     Objective:     Vital Signs (Most Recent):  Temp: 97.9 °F (36.6 °C) (06/20/22 1143)  Pulse: 71 (06/20/22 1143)  Resp: 18 (06/20/22 1143)  BP: (!) 90/47 (06/20/22 1143)  SpO2: 100 % (06/20/22 1143)   Vital Signs (24h Range):  Temp:  [97.4 °F (36.3 °C)-98.2 °F (36.8 °C)] 97.9 °F (36.6 °C)  Pulse:  [65-88] 71  Resp:  [16-18] 18  SpO2:  [96 %-100 %] 100 %  BP: ()/(42-59) 90/47     Weight: 51.9 kg (114 lb 6.4 oz)  Body mass index is 20.26 kg/m².  No intake or output data in the 24 hours ending 06/20/22 1425   Physical Exam  Cardiovascular:      Rate and Rhythm: Normal rate and regular rhythm.      Heart sounds: No murmur heard.    No gallop.   Pulmonary:      Effort: Pulmonary effort is normal.      Breath sounds: Normal breath sounds.   Abdominal:      Comments: Midline incision with areas of dehiscence.  Dressings over multiple pustular lesions including adjacent to ostomy bag.  No strike-through noted today, improving.   Neurological:      Mental Status: She is alert.       Significant Labs: All pertinent labs within the past 24 hours have been reviewed.    Significant Imaging: I have reviewed all pertinent imaging results/findings within the past 24 hours.

## 2022-06-20 NOTE — NURSING
Received report from JUAN Mullins. Pt sitting in chair alert and oriented x 4. Pt denies pain. Call light in reach. Pt instructed to call if assistance is needed. Pt voiced understanding.

## 2022-06-20 NOTE — PROGRESS NOTES
SW sent referrals for several SNFs. These were included: [facility], [facility], [facility], [facility], [facility], [facility], [facility], [facility], [facility] and [facility]. ADAN also called in the [facility] and faxed the PASSR.

## 2022-06-20 NOTE — PROGRESS NOTES
"ADAN spoke to the patient about her d/c plan. ADAN informed her that she had made referrals to [facility] and [facility] on her behalf. ADAN told her that [facility] refused her. (Stating that they could not provide the level of care.) The patient still wants and believes she will be accepted to [facility] SNF. ADAN stressed that there was a good chance that she would not, and should pick another facility. Ms. Hart would not pick another. She stated that she would like to stay here until her wounds are better. ADAN told her that her insurance company will expect her to go to a "lower level of care". ADAN explained what this is, usually a SNF.   ADAN spoke with Jen at [facility] who said that they are full and have many patient's on the waiting list.  "

## 2022-06-20 NOTE — PROGRESS NOTES
"[facility] Surg [facility] Medicine  Progress Note    Patient Name: Odette Hart  MRN: [id number]  Patient Class: IP- Inpatient   Admission Date: 6/8/2022  Length of Stay: 12 days  Attending Physician: Elina Moncada MD  Primary Care Provider: Braxton Barragan MD        Subjective:     Principal Problem:Acute on chronic congestive heart failure        HPI:  From H&P by Martha Hameed PA:  "Ms. Odette Hart is a 66 y.o. 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 She notes sudden onset a few days ago, with worsening tonight. She states this is worse with exertion. She also notes pain around her colostomy site over the past several days. She was seen in the ED due to this pain on 6/6/22 and was diagnosed with a UTI, and discharged on Macrobid. She states she had two episodes of emesis yesterday. She was evaluated in the ED with labs showing anemia with H&H of 10.9/33.1, ALP of 138, BNP of >4900. CXR showed prominent pulmonary vasculature, with interstitial and patchy alveolar infiltrates, and without pleural effusion. She was treated in the ED with 40 mg IV lasix. She was admitted to inpatient status."      Overview/Hospital Course:  Patient diuresed with improvement in her shortness of breath and lower extremity swelling.  Able to tolerate her diet.  She was seen by surgery and wound care ordered to evaluate the ostomy and midline incision with dehisced areas.  In addition to dehiscence the wound care nurse noted multiple " pustular lesions scattered on the patient's buttocks and abdomen that were new.  She was started on IV vancomycin on 6/15 with some improvement.  Surgery re-evaluated and recommended continued wound care and antibiotics.  As she will need daily wound care SNF was consulted.      Interval History:  She is doing well except for the lesions involving the appliance.  Has 2 lesions that are difficult to dress as they are underneath the adhesive of the bag.  Daily wound care still being done.    Review of Systems   Constitutional:  Negative for chills and fever.   Respiratory:  Negative for cough and shortness of breath.    Cardiovascular:  Positive for leg swelling. Negative for chest pain and palpitations.   Skin:         Painful draining lesions scattered over abdomen.     Objective:     Vital Signs (Most Recent):  Temp: 97.9 °F (36.6 °C) (06/20/22 1143)  Pulse: 71 (06/20/22 1143)  Resp: 18 (06/20/22 1143)  BP: (!) 90/47 (06/20/22 1143)  SpO2: 100 % (06/20/22 1143)   Vital Signs (24h Range):  Temp:  [97.4 °F (36.3 °C)-98.2 °F (36.8 °C)] 97.9 °F (36.6 °C)  Pulse:  [65-88] 71  Resp:  [16-18] 18  SpO2:  [96 %-100 %] 100 %  BP: ()/(42-59) 90/47     Weight: 51.9 kg (114 lb 6.4 oz)  Body mass index is 20.26 kg/m².  No intake or output data in the 24 hours ending 06/20/22 1425   Physical Exam  Cardiovascular:      Rate and Rhythm: Normal rate and regular rhythm.      Heart sounds: No murmur heard.    No gallop.   Pulmonary:      Effort: Pulmonary effort is normal.      Breath sounds: Normal breath sounds.   Abdominal:      Comments: Midline incision with areas of dehiscence.  Dressings over multiple pustular lesions including adjacent to ostomy bag.  No strike-through noted today, improving.   Neurological:      Mental Status: She is alert.       Significant Labs: All pertinent labs within the past 24 hours have been reviewed.    Significant Imaging: I have reviewed all pertinent imaging results/findings within the past  24 hours.      Assessment/Plan:      * Acute on chronic congestive heart failure  Acute on chronic combines systolic and diastolic HF   Echo  5/24/2022  Summary  · The left ventricle is severely enlarged with eccentric hypertrophy and severely decreased systolic function.  · Severe left atrial enlargement.  · Grade III left ventricular diastolic dysfunction.  · Normal right ventricular size with normal right ventricular systolic function.  · Mild-to-moderate mitral regurgitation.  · Mild tricuspid regurgitation.  · There is mild pulmonary hypertension.  - CHF pathways initiated   - monitor during diuresis     -  20 mg lasix oral daily with 12.5 mg aldactone    - clinically euvolemic      Cellulitis of abdominal wall  Patient with new eruption of pustules over her abdomen.  Wound care nurse had noted coccygeal DTI on presentation as well as 2 pustules on her buttocks.  Now with new pustules on her abdomen.  Cause of this is unclear, although appears to be a Staph infection.  Started vancomycin.  Surgery following, unclear how this is related to the recent surgery/ostomy, although there is some wound dehiscence that looks unrelated to the skin infection.  Dr. Cardozo was the CRS on her case and has visited her here, see his note.  Having good improvement on the antibiotics with daily wound care.    Superficial dehiscence of operation wound  appr sx recs  Wound care  Had leaking ostomy at home, doing OK here  Had both a colo-vaginal and colo-vesical fistula prior to surgery, repaired.  Required RLQ drain for some time.  Recent ct abdomen 6/6 showed post operative changes without a large focal fluid collection.      Increased nutritional needs        CAD (coronary artery disease)  - continue Plavix , asa, zetia  - 1/24/2022: [facility]: Cath: LAD: Proximal stent patent. LCX: Proximal 80%. LCX: Dominant. Moderate disease. LCX: HEVER 2.75 x 18 mm. Distal dissection. HEVER 2.75 x 22 mm.       Debility  - fall precautions   - She  will need SNF due to need for daily wound care.    Anemia  - H&H appear to be at approximate baseline   - monitor       Primary hypertension  - mildly hypotensive with preserved MAP at time of admission   - home meds: carvedilol 3.125 mg BID, lasix 20 mg QD, spironolactone 12.5 mg QD  - monitor     Ischemic cardiomyopathy  - history noted   - s/p ICD placement         VTE Risk Mitigation (From admission, onward)         Ordered     enoxaparin injection 40 mg  Daily         06/08/22 1122     IP VTE HIGH RISK PATIENT  Once         06/08/22 0253     Place sequential compression device  Until discontinued         06/08/22 0253     Place sequential compression device  Until discontinued         06/08/22 0252                        Elina Lyons MD  Department of Hospital Medicine   [facility] ([facility])

## 2022-06-20 NOTE — PLAN OF CARE
Per SW Pat, patient not accepted to [facility]. [facility] does not have beds. SW Pat to send additional referrals to SNF facilities.  Accepting facilities to be presented to the patient once received. SW Pat to continue to follow for d/c needs.

## 2022-06-20 NOTE — NURSING
Right wrist IV removed. C/o pain to site. Area red with thrombosed vein noted. Warm compresses for comfort recommended.

## 2022-06-20 NOTE — PT/OT/SLP PROGRESS
Physical Therapy      Patient Name:  Odette Hart   MRN:  [id number]    Patient not seen today secondary to Other (Comment) ( with patient). Will follow-up as schedule allows and as pt is available / appropriate.

## 2022-06-20 NOTE — ASSESSMENT & PLAN NOTE
Patient with new eruption of pustules over her abdomen.  Wound care nurse had noted coccygeal DTI on presentation as well as 2 pustules on her buttocks.  Now with new pustules on her abdomen.  Cause of this is unclear, although appears to be a Staph infection.  Started vancomycin.  Surgery following, unclear how this is related to the recent surgery/ostomy, although there is some wound dehiscence that looks unrelated to the skin infection.  Dr. Cardozo was the CRS on her case and has visited her here, see his note.  Having good improvement on the antibiotics with daily wound care.

## 2022-06-20 NOTE — PROGRESS NOTES
Patient reviewed, renal function - cannot determine (ordered Scr), cultures reviewed, no new levels, continue current therapy; Next levels due: 6/21 @0558

## 2022-06-21 NOTE — PLAN OF CARE
06/21/22 1520   Discharge Reassessment   Assessment Type Discharge Planning Reassessment   Did the patient's condition or plan change since previous assessment? No   Discharge Plan discussed with: Patient   Communicated SIVAN with patient/caregiver Yes   Discharge Plan A Skilled Nursing Facility   Discharge Plan B Home with family;Home Health   DME Needed Upon Discharge  none   Discharge Barriers Identified Other (see comments)  (Patient only wishes to consider [facility], but there are no beds available or [facility] which denied her. She does need wound care which she is not able to do at this time.)   Why the patient remains in the hospital Requires continued medical care   Post-Acute Status   Post-Acute Authorization Placement   Post-Acute Placement Status Referrals Sent   Patient choice form signed by patient/caregiver List from [employer]   Discharge Delays None known at this time     ADAN spoke with the patient this morning. She was doing well walking down the hallway with her walker. She is stronger and could return to her home, except for the wound care she needs daily. She is not able to do this herself. She continues to hope for a bed at [facility], but no bed will be available for the foreseeable future. She told ADAN that she was not trying hard enough to get her into [facility] ADAN stated that she has sent them referrals twice and called daily.    ADAN sent other facilities referrals in the case that the patient is ready for d/c'd but still has no one to do her wound care. [facility] is interested in the patient and requested more information on her wounds. ADAN sent this to Yaa at [facility]. ADAN called Asha  at [facility] three times, but was unable to reach her today. ADAN left her a message to return her call.  [facility] responded by saying they were not in next work as did [facility]. [facility] had no open beds. ADAN will continue to follow up.

## 2022-06-21 NOTE — PROGRESS NOTES
Pharmacokinetic Assessment Follow Up: IV Vancomycin    Vancomycin serum concentration assessment(s):    The trough level was drawn correctly and can be used to guide therapy at this time. The measurement is within the desired definitive target range of 10 to 20 mcg/mL.    Vancomycin Regimen Plan:    Continue regimen to Vancomycin 750 mg IV every 12 hours with next serum trough concentration measured at 2130 prior to next dose on 6/22/22.    Drug levels (last 3 results):  Recent Labs   Lab Result Units 06/19/22  1441 06/21/22  0914   Vancomycin-Trough ug/mL 24.2* 19.4       Pharmacy will continue to follow and monitor vancomycin.    Please contact pharmacy at extension [phone] for questions regarding this assessment.    Thank you for the consult,   Melanie Christy       Patient brief summary:  Odette Hart is a 66 y.o. female initiated on antimicrobial therapy with IV Vancomycin for treatment of skin & soft tissue infection    The patient's current regimen is 750 mg IVPB every 12 hours. SCr not drawn on Monday per order. Order modified to 6/22/22 at 2130 with Vancomycin level. Also No urine output charted to review.     Drug Allergies:   Review of patient's allergies indicates:   Allergen Reactions    Augmentin [amoxicillin-pot clavulanate] Swelling     Not allergic to amoxicillin just a derivative of augmentin    Lisinopril Other (See Comments)     Fluid around heart    Losartan Other (See Comments)     High potassium    Shellfish containing products Anaphylaxis     Pt.states she is allergic to SEAFOOD since the age of 13    Levaquin [levofloxacin] Other (See Comments)     Very bad joint pain    Clindamycin Palpitations     Chest pain       Actual Body Weight:   51.2 kg    Renal Function:   CrCl cannot be calculated (Patient's most recent lab result is older than the maximum 7 days allowed.).,     Dialysis Method (if applicable):  N/A    CBC (last 72 hours):  No results for input(s): WHITE BLOOD CELL COUNT,  HEMOGLOBIN, HEMATOCRIT, PLATELETS, GRAN%, LYMPH%, MONO%, EOSINOPHIL%, BASOPHIL%, DIFFERENTIAL METHOD in the last 72 hours.    Metabolic Panel (last 72 hours):  No results for input(s): SODIUM, POTASSIUM, CHLORIDE, CO2, GLUCOSE, BUN BLD, CREATININE, ALBUMIN, BILIRUBIN TOTAL, ALK PHOS, AST, ALT, MAGNESIUM, PHOSPHORUS in the last 72 hours.    Vancomycin Administrations:  vancomycin given in the last 96 hours                     vancomycin 750 mg in dextrose 5 % 250 mL IVPB (ready to mix system) (mg) 750 mg New Bag 06/21/22 0959     750 mg New Bag 06/20/22 2206     750 mg New Bag  1202     750 mg New Bag 06/19/22 2330    vancomycin 1.25 g in dextrose 5% 250 mL IVPB (ready to mix) (mg) 1,250 mg New Bag 06/19/22 0319     1,250 mg New Bag 06/18/22 1543     1,250 mg New Bag  0400                    Microbiologic Results:  Microbiology Results (last 7 days)       ** No results found for the last 168 hours. **

## 2022-06-21 NOTE — PT/OT/SLP PROGRESS
"Occupational Therapy   Treatment & Discharge Summary    Name: Odette Hart  MRN: [id number]  Admitting Diagnosis:  Acute on chronic congestive heart failure       Recommendations:     Discharge Recommendations: home health OT, home health PT  Discharge Equipment Recommendations:  rollator, colostomy/ostomy supplies, tub transfer bench, reacher  Barriers to discharge:   (medical needs)    Assessment:     Odette Hart is a 66 y.o. female with a medical diagnosis of Acute on chronic congestive heart failure.  She presents with edema in both feet, pain in R knee, and pain in both shoulders. Performance deficits affecting function are weakness, impaired endurance, impaired self care skills, impaired functional mobilty, gait instability, impaired balance, impaired skin, edema, decreased coordination, decreased lower extremity function, impaired cardiopulmonary response to activity.  Pt agreeable to participating in therapy upon arrival to room.  Pt demonstrated improvement with toilet transfer to toilet performing with Mod I.      Overall, pt tolerated therapy well.  Pt reports her legs feeling "heavy" causing her to require increased time and effort to perform certain activities.  Pt has made significant progress and no longer requires OT services in the acute care setting at this time.  Pt to d/c from OT.  Home health is recommended upon d/c from acute care to further address deficits and help pt improve overall functional independence.       Rehab Prognosis:  Good; patient would benefit from acute skilled OT services to address these deficits and reach maximum level of function.       Plan:     · Patient to d/c from OT; home health recommended    Subjective     Pitting edema noted in both feet; no pain indicated    Pain/Comfort:  · Pain Rating 1:  (pt reported increased pain in shoulders; did not rate)  · Pain Rating Post-Intervention 2:  (pain in shoulders remained consistent)    Objective:     Communicated " "with: Patient found up in chair with peripheral IV, colostomy, telemetry upon OT entry to room.    General Precautions: Standard, fall   Orthopedic Precautions:N/A   Braces: N/A  Respiratory Status: Room air     Occupational Performance:     Bed Mobility:    · Not assessed 2* pt up in chair upon arrival.    Functional Mobility/Transfers:  · Sit <> Stand:   · Mod I x 1 trial from chair  · Slightly increased time  · Mod I, grab bar x 1 trial from toilet  · Toilet transfer: Mod I, grab bar taking steps to toilet.  · Functional Mobility: Pt ambulated ~40 ft with Mod I (slightly increased time required).  · No instances of LOB noted.     Activities of Daily Living:  · Lower Body Dressing: modified independence for adjusting slippers while seated up in chair utilizing figure four dressing technique.  · Increased time required 2* pt reporting legs feeling "heavy"      [facility] 6 Click ADL: 22    Treatment & Education:  *Pt educated on role of OT in acute care setting  *Pt ambulated within room to address coordination, endurance, and balance needed for functional mobility.  *Pt performed toilet transfer to toilet; cues provided for technique  *POC reviewed with pt      Patient left up in chair with call button in reach and food services presentEducation:  ; breakfast tray set up    GOALS:   Multidisciplinary Problems     Occupational Therapy Goals        Problem: Occupational Therapy    Goal Priority Disciplines Outcome Interventions   Occupational Therapy Goal     OT, PT/OT Ongoing, Progressing    Description: Goals to be met by: 6/24/2022     Patient will increase functional independence with ADLs by performing:    UE Dressing with Modified [place].  LE Dressing with Modified [place].  Grooming while standing at sink with Modified [place].  Toileting from bedside commode with Modified [place] for hygiene and clothing management.   Toilet transfer to bedside commode with Modified [place].    Completed " Goals:  Toilet transfer to bedside commode with Modified [place]. MET 6/21/2022                     Time Tracking:     OT Date of Treatment: 06/21/22  OT Start Time: 1007  OT Stop Time: 1020  OT Total Time (min): 13 min    Billable Minutes:Self Care/Home Management 13    OT/FROILAN: OT          6/21/2022

## 2022-06-21 NOTE — PROGRESS NOTES
[facility])  Wound Care    Patient Name:  Odette Hart   MRN:  [id number]  Date: 2022  Diagnosis: Acute on chronic congestive heart failure    History:     Past Medical History:   Diagnosis Date    Acute coronary syndrome     Acute exacerbation of chronic obstructive pulmonary disease (COPD) 3/23/2022    Allergy     Arthritis     Cardiomyopathy     CHF (congestive heart failure)     Coronary artery disease     Coronary artery disease of native artery of native heart with stable angina pectoris 2021: [facility]: Cath: LAD: Proximal stent patent. LCX: Proximal 80%. LCX: Dominant. Moderate disease. LCX: HEVER 2.75 x 18 mm. Distal dissection. HEVER 2.75 x 22 mm.     Diverticulitis     Diverticulosis     Familial hypercholesterolemia 2022    Former smoker 2022    Heart attack     Heart disease     History of myocardial infarction 2022    Hyperlipidemia     Hypertension     Hypertension 2022    ICD (implantable cardioverter-defibrillator) in place     Non-ST elevation myocardial infarction (NSTEMI) 2019       Social History     Socioeconomic History    Marital status:    Tobacco Use    Smoking status: Former Smoker     Packs/day: 0.50     Start date: 1976     Quit date: 2012     Years since quittin.5    Smokeless tobacco: Never Used   Substance and Sexual Activity    Alcohol use: No    Drug use: No     Social Determinants of Health     Financial Resource Strain: Low Risk     Difficulty of Paying Living Expenses: Not hard at all   Food Insecurity: No Food Insecurity    Worried About Running Out of Food in the Last Year: Never true    Ran Out of Food in the Last Year: Never true   Transportation Needs: No Transportation Needs    Lack of Transportation (Medical): No    Lack of Transportation (Non-Medical): No   Physical Activity: Inactive    Days of Exercise per Week: 0 days    Minutes of Exercise per Session: 0 min   Stress: No Stress Concern  Present    Feeling of Stress : Not at all   Social Connections: Socially Isolated    Frequency of Communication with Friends and Family: More than three times a week    Frequency of Social Gatherings with Friends and Family: Once a week    Attends Protestant Services: Never    Active Member of Clubs or Organizations: No    Attends Club or Organization Meetings: Never    Marital Status:    Housing Stability: Low Risk     Unable to Pay for Housing in the Last Year: No    Number of Places Lived in the Last Year: 1    Unstable Housing in the Last Year: No       Precautions:     Allergies as of 06/08/2022 - Reviewed 06/08/2022   Allergen Reaction Noted    Augmentin [amoxicillin-pot clavulanate] Swelling 02/19/2018    Lisinopril Other (See Comments) 02/19/2018    Losartan Other (See Comments) 02/19/2018    Shellfish containing products Anaphylaxis 09/10/2018    Levaquin [levofloxacin] Other (See Comments) 09/18/2018    Clindamycin Palpitations 01/21/2019       [facility] Assessment Details/Treatment     Wound care consult received for assessment of sacrum and left arm skin tear. Patient currently being followed by wound care weekly. Wounds to abdomen and sacrum with less slough than previous assessment. Patient asked that I did not reassess skin tear because of recent dressing change to that area. Recommend nursing to continue with wound care orders currently in place. Nursing to maintain pressure injury prevention interventions. Wound care to assist with pt prn.       06/21/22 1325        Altered Skin Integrity 05/09/22 1630 Sacral spine Full thickness tissue loss. Base is covered by slough and/or eschar in the wound bed   Date First Assessed/Time First Assessed: 05/09/22 1630   Altered Skin Integrity Present on Admission: yes  Location: Sacral spine  Description of Altered Skin Integrity: Full thickness tissue loss. Base is covered by slough and/or eschar in the wound bed   Wound Image    Description of Altered Skin  Integrity Full thickness tissue loss. Base is covered by slough and/or eschar in the wound bed   Dressing Appearance Dry;Intact;Clean   Drainage Amount Scant   Drainage Characteristics/Odor No odor   Appearance Yellow;Slough;Moist;Red   Tissue loss description Full thickness   Periwound Area Redness   Wound Edges Open;Defined   Wound Length (cm) 3 cm   Wound Width (cm) 1.2 cm   Wound Depth (cm) 0.3 cm   Wound Volume (cm^3) 1.08 cm^3   Wound Surface Area (cm^2) 3.6 cm^2   Care Cleansed with:;Wound cleanser   Dressing Applied;Silicone;Foam  (Santyl ointment)   Dressing Change Due 06/22/22        Incision/Site 05/09/22 1135 Abdomen   Date First Assessed/Time First Assessed: 05/09/22 1135   Present Prior to Hospital Arrival?: Yes  Location: Abdomen   Wound Image     Incision WDL ex   Dressing Appearance Moist drainage;Intact   Drainage Amount Small   Drainage Characteristics/Odor Serous;Yellow   Appearance Moist;Red;Yellow   Periwound Area Redness;Intact   Wound Edges Open   Care Antimicrobial agent  (Vashe)   Dressing Applied;Silver;Hydrofiber;Silicone;Foam     06/21/2022

## 2022-06-21 NOTE — PT/OT/SLP PROGRESS
Physical Therapy Treatment and Discharge    Patient Name:  Odette Hart   MRN:  [id number]    Recommendations:     Discharge Recommendations:  home health PT (per MD rec for wound care needs)   Discharge Equipment Recommendations: bath bench, colostomy/ostomy supplies (reacher)   Barriers to discharge: None    Assessment:     Odette Hart is a 66 y.o. female admitted with a medical diagnosis of Acute on chronic congestive heart failure. Previous hospitalization for colovesical fistula s/p appendectomy, colostomy, cecectomy, and cholecystectomy 05/09 with DC to SNF. Readmitted from SNF 5/23-5/31 d/t LI with DC to home with HH.  She presents with the following impairments/functional limitations:  gait instability, decreased lower extremity function, pain, edema .    Patient demo's mod(I) mobility with and without rollator. Has been performing ambulation and ramp navigation ad gilberto for strengthening. Discussed DC from acute care PT with sufficient achievement of goals with pt in agreement - deferred trial of TUG d/t increased muscle soreness from ramp walking.     During this hospitalization, patient does not require further acute PT services.  Please re-consult if situation changes.      Rehab Prognosis: Good; patient would benefit from acute skilled PT services to address these deficits and reach maximum level of function.    Recent Surgery: * No surgery found *      Plan:     During this hospitalization, patient to be seen  (DC) to address the identified rehab impairments via  (DC) and progress toward the following goals:    · Plan of Care Expires:  06/21/22    Subjective     Chief Complaint: Increased abd pain last week d/t lots of salads, increased soreness from walking up ramp  Patient/Family Comments/goals: Asking about knee brace; Patient agreeable to PT treatment.  Pain/Comfort:  Pain Rating 1:  (unrated)  Location - Orientation 1: generalized  Location 1:  (arms, hamstrings and buttocks soreness; R  knee pain at patellar tendon)  Pain Rating Post-Intervention 1:  (improved knee pain with application of ace wrap)      Objective:     Communicated with OT prior to session.  Patient found ambulatory in room/johnson with peripheral IV, colostomy upon PT entry to room.     General Precautions: Standard, fall   Orthopedic Precautions:N/A   Braces: N/A  Respiratory Status: Room air     Functional Mobility:  · Transfers:     · Sit to Stand:  modified independence with no AD and 4 wheeled walker   · Gait: 2x200 ft with mod(I) with rollator - brief bouts with no AD.   · With rollator, slow but stable gait with pt able to maintain conversation while walking.   · Without AD, increased gait instability without overt LOB.   · Stairs:  Pt ascended/descended 8 stair(s) with No Assistive Device with bilateral handrails with Modified Independent.    · Cueing for step to gait pattern leading with LLE when ascending d/t R patellar tendon pain with good effect on pain level      AM-PAC 6 CLICK MOBILITY  Turning over in bed (including adjusting bedclothes, sheets and blankets)?: 4  Sitting down on and standing up from a chair with arms (e.g., wheelchair, bedside commode, etc.): 4  Moving from lying on back to sitting on the side of the bed?: 4  Moving to and from a bed to a chair (including a wheelchair)?: 4  Need to walk in hospital room?: 4  Climbing 3-5 steps with a railing?: 4  Basic Mobility Total Score: 24       Therapeutic Activities and Exercises:  · Assisted pt in donning ACE wrap for R knee pain - instructed pt to remove if increased R distal edema     Patient left ambulatory in room/johnson with all lines intact and JUAN Braun notified.    GOALS:   Multidisciplinary Problems     Physical Therapy Goals        Problem: Physical Therapy    Goal Priority Disciplines Outcome Goal Variances Interventions   Physical Therapy Goal     PT, PT/OT Adequate for Care Transition     Description: Goals to be met by: 6/22/22    Patient will  increase functional independence with mobility by performin. TUG in less than 12 sec to indicate not at increased risk for falls.   2. Gait x 150 feet with supervision with rollator or no AD. MET   3. Ascend/descend 4 step(s) with no AD and BHR. MET                    Time Tracking:     PT Received On: 22  PT Start Time: 1521     PT Stop Time: 1544  PT Total Time (min): 23 min     Billable Minutes: Gait Training 15 and Therapeutic Activity 8    Treatment Type: Treatment (DC)  PT/PTA: PT     PTA Visit Number: 0     2022

## 2022-06-21 NOTE — PLAN OF CARE
Problem: Occupational Therapy  Goal: Occupational Therapy Goal  Description: Goals to be met by: 6/24/2022     Patient will increase functional independence with ADLs by performing:    UE Dressing with Modified [place].  LE Dressing with Modified [place].  Grooming while standing at sink with Modified [place].  Toileting from bedside commode with Modified [place] for hygiene and clothing management.   Toilet transfer to bedside commode with Modified [place].    Completed Goals:  Toilet transfer to bedside commode with Modified [place]. MET 6/21/2022    Outcome: Ongoing, Progressing     Pt has made significant progress and no longer requires OT services in the acute care setting at this time.  Pt to d/c from OT.  Home health is recommended upon d/c from acute care to further address deficits and help pt improve overall functional independence.     WILL Hess  6/21/2022

## 2022-06-21 NOTE — PROGRESS NOTES
"[facility] Medicine  Progress Note    Patient Name: Odette Hart  MRN: [id number]  Patient Class: IP- Inpatient   Admission Date: 6/8/2022  Length of Stay: 13 days  Attending Physician: Sanjuanita Briones MD  Primary Care Provider: Braxton Barragan MD        Subjective:     Principal Problem:Acute on chronic congestive heart failure        HPI:  From H&P by Martha Hameed PA:  "Ms. Odette Hart is a 66 y.o. female, with PMH of HFrEF (EF 20%), CAD s/p 2x HEVER placement in 1/2022 (on Plavix), Ischemic Cardiomyopathy s/p ICD placement, RBBB, HTN, calculus cholecystitis s/p recent cholecystectomy, colovesicular fistula 2/2 complicated diverticulitis s/p cystoscopy with BL ureteral catheters, open sigmoid colon resection, appendectomy, and creation of end colostomy on 5/9/22, anemia, chronic hypoxemic respiratory failure (not on home O2), MDD, Debility, who presented to [facility] ED on 6/8/22 due to shortness of breath. She notes sudden onset a few days ago, with worsening tonight. She states this is worse with exertion. She also notes pain around her colostomy site over the past several days. She was seen in the ED due to this pain on 6/6/22 and was diagnosed with a UTI, and discharged on Macrobid. She states she had two episodes of emesis yesterday. She was evaluated in the ED with labs showing anemia with H&H of 10.9/33.1, ALP of 138, BNP of >4900. CXR showed prominent pulmonary vasculature, with interstitial and patchy alveolar infiltrates, and without pleural effusion. She was treated in the ED with 40 mg IV lasix. She was admitted to inpatient status."      Overview/Hospital Course:  Patient diuresed with improvement in her shortness of breath and lower extremity swelling.  Able to tolerate her diet.  She was seen by surgery and wound care ordered to evaluate the ostomy and midline incision with dehisced areas.  In addition to dehiscence the wound care nurse noted multiple pustular " lesions scattered on the patient's buttocks and abdomen that were new.  She was started on IV vancomycin on 6/15 with some improvement.  Surgery re-evaluated and recommended continued wound care and antibiotics.  As she will need daily wound care SNF was consulted.      Interval History:  More swollen in legs for past 2 days per patient, healing lesions on abdomen but still has draining abdominal wound.  Review of Systems   Constitutional:  Negative for chills and fever.   Respiratory:  Negative for cough and shortness of breath.    Cardiovascular:  Positive for leg swelling. Negative for chest pain and palpitations.   Skin:  Positive for rash and wound.        Painful draining lesions scattered over abdomen.     Objective:     Vital Signs (Most Recent):  Temp: 97.9 °F (36.6 °C) (06/21/22 1526)  Pulse: 67 (06/21/22 1526)  Resp: 12 (06/21/22 1526)  BP: (!) 110/54 (06/21/22 1526)  SpO2: 100 % (06/21/22 1526)   Vital Signs (24h Range):  Temp:  [97 °F (36.1 °C)-98.1 °F (36.7 °C)] 97.9 °F (36.6 °C)  Pulse:  [61-82] 67  Resp:  [12-20] 12  SpO2:  [95 %-100 %] 100 %  BP: ()/(44-57) 110/54     Weight: 51.2 kg (112 lb 15.4 oz)  Body mass index is 20.01 kg/m².    Intake/Output Summary (Last 24 hours) at 6/21/2022 1540  Last data filed at 6/21/2022 1100  Gross per 24 hour   Intake 240 ml   Output --   Net 240 ml        Physical Exam  Constitutional:       General: She is not in acute distress.  Eyes:      Extraocular Movements: Extraocular movements intact.      Pupils: Pupils are equal, round, and reactive to light.   Cardiovascular:      Rate and Rhythm: Normal rate and regular rhythm.      Heart sounds: No murmur heard.    No gallop.   Pulmonary:      Effort: Pulmonary effort is normal.      Breath sounds: Normal breath sounds.   Abdominal:      General: Bowel sounds are normal.      Palpations: Abdomen is soft.      Tenderness: There is no abdominal tenderness.      Comments: Midline incision with areas of dehiscence.   Dressings over multiple pustular lesions including adjacent to ostomy bag, drying up.   Musculoskeletal:         General: Swelling present. Normal range of motion.      Cervical back: Normal range of motion and neck supple.   Neurological:      General: No focal deficit present.      Mental Status: She is alert and oriented to person, place, and time.   Psychiatric:         Mood and Affect: Mood normal.         Behavior: Behavior normal.       Significant Labs: All pertinent labs within the past 24 hours have been reviewed.    Significant Imaging: I have reviewed all pertinent imaging results/findings within the past 24 hours.      Assessment/Plan:      * Acute on chronic congestive heart failure  Acute on chronic combines systolic and diastolic HF   Echo  5/24/2022  Summary  · The left ventricle is severely enlarged with eccentric hypertrophy and severely decreased systolic function.  · Severe left atrial enlargement.  · Grade III left ventricular diastolic dysfunction.  · Normal right ventricular size with normal right ventricular systolic function.  · Mild-to-moderate mitral regurgitation.  · Mild tricuspid regurgitation.  · There is mild pulmonary hypertension.  - CHF pathways initiated   - monitor during diuresis     -  20 mg lasix oral daily with 12.5 mg aldactone  - has leg swelling, will give lasix 20 mg iv once    Cellulitis of abdominal wall  Patient with new eruption of pustules over her abdomen.  Wound care nurse had noted coccygeal DTI on presentation as well as 2 pustules on her buttocks.  Now with new pustules on her abdomen.  Cause of this is unclear, although appears to be a Staph infection.  Started vancomycin.  Surgery following, unclear how this is related to the recent surgery/ostomy, although there is some wound dehiscence that looks unrelated to the skin infection.  Dr. Cardozo was the CRS on her case and has visited her here, see his note.  Having good improvement on the antibiotics with  daily wound care.    Superficial dehiscence of operation wound  appr sx recs  Wound care  Had leaking ostomy at home, doing OK here  Had both a colo-vaginal and colo-vesical fistula prior to surgery, repaired.  Required RLQ drain for some time.  Recent ct abdomen 6/6 showed post operative changes without a large focal fluid collection.  Daily wound care    Increased nutritional needs  Continue diet and supplements      CAD (coronary artery disease)  - continue Plavix , asa, zetia  - 1/24/2022: [facility]: Cath: LAD: Proximal stent patent. LCX: Proximal 80%. LCX: Dominant. Moderate disease. LCX: HEVER 2.75 x 18 mm. Distal dissection. HEVER 2.75 x 22 mm.       Debility  - fall precautions   - She will need SNF due to need for daily wound care.    Anemia  - H&H appear to be at approximate baseline   - monitor       Primary hypertension  - mildly hypotensive with preserved MAP at time of admission   - home meds: carvedilol 3.125 mg BID, lasix 20 mg QD, spironolactone 12.5 mg QD  - monitor     Ischemic cardiomyopathy  - history noted   - s/p ICD placement         VTE Risk Mitigation (From admission, onward)         Ordered     enoxaparin injection 40 mg  Daily         06/08/22 1122     IP VTE HIGH RISK PATIENT  Once         06/08/22 0253     Place sequential compression device  Until discontinued         06/08/22 0253     Place sequential compression device  Until discontinued         06/08/22 0252                Discharge Planning   SIVAN:      Code Status: Full Code   Is the patient medically ready for discharge?:     Reason for patient still in hospital (select all that apply): Patient trending condition and Treatment  Discharge Plan A: (P) Skilled Nursing Facility   Discharge Delays: (P) None known at this time              Sanjuanita Briones MD  Department of Hospital Medicine   [facility])

## 2022-06-21 NOTE — PROGRESS NOTES
Pharmacokinetic Assessment Follow Up: IV Vancomycin    Vancomycin serum concentration assessment(s):    The trough level was drawn correctly and can be used to guide therapy at this time. The measurement is within the desired definitive target range of 10 to 20 mcg/mL.    Vancomycin Regimen Plan:    Continue regimen to Vancomycin 750 mg IV every 12 hours with next serum trough concentration measured at 0930 prior to 1000 dose on 6/23    Drug levels (last 3 results):  Recent Labs   Lab Result Units 06/19/22  1441 06/21/22  0914   Vancomycin-Trough ug/mL 24.2* 19.4       Pharmacy will continue to follow and monitor vancomycin.    Please contact pharmacy at extension [phone] for questions regarding this assessment.    Thank you for the consult,   Jen Swann       Patient brief summary:  Odetet Hart is a 66 y.o. female initiated on antimicrobial therapy with IV Vancomycin for treatment of skin & soft tissue infection    The patient's current regimen is Vancomycin 750mg IVPB every 12 hours    Drug Allergies:   Review of patient's allergies indicates:   Allergen Reactions    Augmentin [amoxicillin-pot clavulanate] Swelling     Not allergic to amoxicillin just a derivative of augmentin    Lisinopril Other (See Comments)     Fluid around heart    Losartan Other (See Comments)     High potassium    Shellfish containing products Anaphylaxis     Pt.states she is allergic to SEAFOOD since the age of 13    Levaquin [levofloxacin] Other (See Comments)     Very bad joint pain    Clindamycin Palpitations     Chest pain       Actual Body Weight:   51.2kg    Renal Function:   CrCl cannot be calculated (Patient's most recent lab result is older than the maximum 7 days allowed.).,     Dialysis Method (if applicable):  N/A    CBC (last 72 hours):  No results for input(s): WHITE BLOOD CELL COUNT, HEMOGLOBIN, HEMATOCRIT, PLATELETS, GRAN%, LYMPH%, MONO%, EOSINOPHIL%, BASOPHIL%, DIFFERENTIAL METHOD in the last 72 hours.    Metabolic  Panel (last 72 hours):  No results for input(s): SODIUM, POTASSIUM, CHLORIDE, CO2, GLUCOSE, BUN BLD, CREATININE, ALBUMIN, BILIRUBIN TOTAL, ALK PHOS, AST, ALT, MAGNESIUM, PHOSPHORUS in the last 72 hours.    Vancomycin Administrations:  vancomycin given in the last 96 hours                     vancomycin 750 mg in dextrose 5 % 250 mL IVPB (ready to mix system) (mg) 750 mg New Bag 06/21/22 0959     750 mg New Bag 06/20/22 2206     750 mg New Bag  1202     750 mg New Bag 06/19/22 2330    vancomycin 1.25 g in dextrose 5% 250 mL IVPB (ready to mix) (mg) 1,250 mg New Bag 06/19/22 0319     1,250 mg New Bag 06/18/22 1543     1,250 mg New Bag  0400                    Microbiologic Results:  Microbiology Results (last 7 days)       ** No results found for the last 168 hours. **

## 2022-06-21 NOTE — ASSESSMENT & PLAN NOTE
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  Required RLQ drain for some time.  Recent ct abdomen 6/6 showed post operative changes without a large focal fluid collection.  Daily wound care

## 2022-06-21 NOTE — PLAN OF CARE
Problem: Physical Therapy  Goal: Physical Therapy Goal  Description: Goals to be met by: 22    Patient will increase functional independence with mobility by performin. TUG in less than 12 sec to indicate not at increased risk for falls.   2. Gait x 150 feet with supervision with rollator or no AD. MET   3. Ascend/descend 4 step(s) with no AD and BHR. MET   Outcome: Adequate for Care Transition     Patient demo's mod(I) mobility with and without rollator. Has been performing ambulation and ramp navigation ad gilberto for strengthening. Discussed DC from acute care PT with sufficient achievement of goals with pt in agreement - deferred trial of TUG d/t increased muscle soreness from ramp walking.

## 2022-06-21 NOTE — SUBJECTIVE & OBJECTIVE
Interval History:  More swollen in legs for past 2 days per patient, healing lesions on abdomen but still has draining abdominal wound.  Review of Systems   Constitutional:  Negative for chills and fever.   Respiratory:  Negative for cough and shortness of breath.    Cardiovascular:  Positive for leg swelling. Negative for chest pain and palpitations.   Skin:  Positive for rash and wound.        Painful draining lesions scattered over abdomen.     Objective:     Vital Signs (Most Recent):  Temp: 97.9 °F (36.6 °C) (06/21/22 1526)  Pulse: 67 (06/21/22 1526)  Resp: 12 (06/21/22 1526)  BP: (!) 110/54 (06/21/22 1526)  SpO2: 100 % (06/21/22 1526)   Vital Signs (24h Range):  Temp:  [97 °F (36.1 °C)-98.1 °F (36.7 °C)] 97.9 °F (36.6 °C)  Pulse:  [61-82] 67  Resp:  [12-20] 12  SpO2:  [95 %-100 %] 100 %  BP: ()/(44-57) 110/54     Weight: 51.2 kg (112 lb 15.4 oz)  Body mass index is 20.01 kg/m².    Intake/Output Summary (Last 24 hours) at 6/21/2022 1540  Last data filed at 6/21/2022 1100  Gross per 24 hour   Intake 240 ml   Output --   Net 240 ml        Physical Exam  Constitutional:       General: She is not in acute distress.  Eyes:      Extraocular Movements: Extraocular movements intact.      Pupils: Pupils are equal, round, and reactive to light.   Cardiovascular:      Rate and Rhythm: Normal rate and regular rhythm.      Heart sounds: No murmur heard.    No gallop.   Pulmonary:      Effort: Pulmonary effort is normal.      Breath sounds: Normal breath sounds.   Abdominal:      General: Bowel sounds are normal.      Palpations: Abdomen is soft.      Tenderness: There is no abdominal tenderness.      Comments: Midline incision with areas of dehiscence.  Dressings over multiple pustular lesions including adjacent to ostomy bag, drying up.   Musculoskeletal:         General: Swelling present. Normal range of motion.      Cervical back: Normal range of motion and neck supple.   Neurological:      General: No focal  deficit present.      Mental Status: She is alert and oriented to person, place, and time.   Psychiatric:         Mood and Affect: Mood normal.         Behavior: Behavior normal.       Significant Labs: All pertinent labs within the past 24 hours have been reviewed.    Significant Imaging: I have reviewed all pertinent imaging results/findings within the past 24 hours.

## 2022-06-21 NOTE — ASSESSMENT & PLAN NOTE
Acute on chronic combines systolic and diastolic HF   Echo  5/24/2022  Summary  · The left ventricle is severely enlarged with eccentric hypertrophy and severely decreased systolic function.  · Severe left atrial enlargement.  · Grade III left ventricular diastolic dysfunction.  · Normal right ventricular size with normal right ventricular systolic function.  · Mild-to-moderate mitral regurgitation.  · Mild tricuspid regurgitation.  · There is mild pulmonary hypertension.  - CHF pathways initiated   - monitor during diuresis     -  20 mg lasix oral daily with 12.5 mg aldactone  - has leg swelling, will give lasix 20 mg iv once

## 2022-06-22 NOTE — ASSESSMENT & PLAN NOTE
Contributing Nutrition Diagnosis  Increased nutrient needs; kcal/protein    Related to (etiology):   Increase demand for nutrients wounds    Signs and Symptoms (as evidenced by):   PO intake 75%, tolerating PO intake, receiving ONS to aid in wound healing   multiple wounds    Interventions/Recommendations (treatment strategy):  Collaboraiton with other healthcare providers  Low fiber/residue diet  ONS    Nutrition Diagnosis Status:   Continues

## 2022-06-22 NOTE — PROGRESS NOTES
ADAN sent additional information regarding the patient's wound care  to Yaa at [facility]. After her DON reviewed the information the patient was accepted there. They could admit the patient, tomorrow. ADAN spoke with Miguelina at [facility]  to check if the auth was currently open. She updated the approval.                                                          Later ADAN met with the patient to discuss the option of going to the SNF at [facility] for wound care. Ms. Syd shankar refused the plan at the facility. She looked it up on line on her phone and said they did not have good reviews.  She said she was not going to a nursing home, [facility].  She called the HH company she had prior to admit, [facility]. They said to call tomorrow as it was close to 5:00 pm. ADAN explained that the DrLuciano Did not think she was able to do the wound her self at home. The patient said that she will have her granddaughter help her and have HH, [facility], come over as much as possible. ADAN told her that she would inform the doctor.      Of her decision.                                                                                                                                                                                                                                                                                                                                                                                                                         ,

## 2022-06-22 NOTE — PROGRESS NOTES
[facility] - Med Surg ([facility])  Adult Nutrition  Progress Note    SUMMARY       Recommendations  1. Continue low fiber/residue diet as tolerated.    -continue ONS Ezra BID and Boost Plus TID as tolerated.   2.Encourage good PO intake.    Goals: Pt to meet % EEN/EPN via PO intake + ONS by RD f/u.  Nutrition Goal Status: goal met  Communication of RD Recs:  (POC)     Assessment and Plan    Increased nutritional needs  Contributing Nutrition Diagnosis  Increased nutrient needs; kcal/protein    Related to (etiology):   Increase demand for nutrients wounds    Signs and Symptoms (as evidenced by):   PO intake 75%, tolerating PO intake, receiving ONS to aid in wound healing   multiple wounds    Interventions/Recommendations (treatment strategy):  Collaboraiton with other healthcare providers  Low fiber/residue diet  ONS    Nutrition Diagnosis Status:   Continues      Malnutrition Assessment     Skin (Micronutrient):  (Paul score 15)     Reason for Assessment    Reason For Assessment: consult, RD follow-up  Diagnosis:  (acute on chronic CHF)  Relevant Medical History:  Past Medical History:   Diagnosis Date    Acute coronary syndrome     Acute exacerbation of chronic obstructive pulmonary disease (COPD) 3/23/2022    Allergy     Arthritis     Cardiomyopathy     CHF (congestive heart failure)     Coronary artery disease     Coronary artery disease of native artery of native heart with stable angina pectoris 4/19/2021 1/24/2022: [facility]: Cath: LAD: Proximal stent patent. LCX: Proximal 80%. LCX: Dominant. Moderate disease. LCX: HEVER 2.75 x 18 mm. Distal dissection. HEVER 2.75 x 22 mm.     Diverticulitis     Diverticulosis     Familial hypercholesterolemia 1/22/2022    Former smoker 1/24/2022    Heart attack     Heart disease     History of myocardial infarction 1/24/2022    Hyperlipidemia     Hypertension     Hypertension 1/24/2022    ICD (implantable cardioverter-defibrillator) in place     Non-ST  "elevation myocardial infarction (NSTEMI) 2/4/2019       Interdisciplinary Rounds: attended  General Information Comments: 6/22 RD consulted for altered skin. Continues to follow patient. Reports her appetiet is getting better. Gas is now improving wiht low fiber/residue diet. Encouragement of ONS to aid in wound healing and to help with PO. PO intake % of meals. Paul Score 15. Remains walking around hospital halls. Will continue to monitor.    Nutrition Discharge Planning: Pt to follow a low Na diet with FR as tolerated.    Nutrition Risk Screen    Nutrition Risk Screen: large or nonhealing wound, burn or pressure injury    Nutrition/Diet History    Spiritual, Cultural Beliefs, Denominational Practices, Values that Affect Care: no    Anthropometrics    Temp: 97.8 °F (36.6 °C)  Height Method: Stated  Height: 5' 3" (160 cm)  Height (inches): 63 in  Weight Method: Bed Scale  Weight: 52.1 kg (114 lb 13.8 oz)  Weight (lb): 114.86 lb  Ideal Body Weight (IBW), Female: 115 lb  % Ideal Body Weight, Female (lb): 97.29 %  BMI (Calculated): 20.4  BMI Grade: 18.5-24.9 - normal     Lab/Procedures/Meds    Pertinent Labs Reviewed: reviewed  Pertinent Labs Comments: none  Pertinent Medications Reviewed: reviewed  Pertinent Medications Comments: carvedilol, clopidogrel, collagenase, docusate Na, enoxaparin, ezetimbie, furomseide, pantoprazole, polyethylene glycol, spironoloactone, vanc.    Estimated/Assessed Needs    Weight Used For Calorie Calculations: 50.1 kg (110 lb 7.2 oz)  Energy Calorie Requirements (kcal): 1400 kcal day (MSJ X AF 1.4)  Energy Need Method: [place]-St Jeor  Protein Requirements: 70 g day (1.4 g/kg- wounds/ CHF)  Weight Used For Protein Calculations: 50.1 kg (110 lb 7.2 oz)  Estimated Fluid Requirement Method: RDA Method  RDA Method (mL): 1400  CHO Requirement: 175 g day    Nutrition Prescription Ordered    Current Diet Order: low fiber/residue  Oral Nutrition Supplement: Ezra BID and Boost Plus " TID    Evaluation of Received Nutrient/Fluid Intake    I/O: -2.6L since admit  Energy Calories Required: meeting needs  Protein Required: meeting needs  Fluid Required: meeting needs  Tolerance: tolerating  % Intake of Estimated Energy Needs: 75 - 100 %  % Meal Intake: 75 - 100 %    Nutrition Risk    Level of Risk/Frequency of Follow-up:  (1 x weekly)     Monitor and Evaluation    Food and Nutrient Intake: energy intake, food and beverage intake  Food and Nutrient Adminstration: diet order  Knowledge/Beliefs/Attitudes: food and nutrition knowledge/skill  Physical Activity and Function: nutrition-related ADLs and IADLs  Anthropometric Measurements: weight, weight change, body mass index  Biochemical Data, Medical Tests and Procedures: glucose/endocrine profile, gastrointestinal profile, inflammatory profile, electrolyte and renal panel  Nutrition-Focused Physical Findings: overall appearance     Nutrition Follow-Up    RD Follow-up?: Yes

## 2022-06-22 NOTE — PLAN OF CARE
Recommendations  1. Continue low fiber/residue diet as tolerated.    -continue ONS Ezra BID and Boost Plus TID as tolerated.   2.Encourage good PO intake.    Goals: Pt to meet % EEN/EPN via PO intake + ONS by RD f/u.  Nutrition Goal Status: goal met  Communication of RD Recs:  (POC)

## 2022-06-22 NOTE — PROGRESS NOTES
ADAN spoke with Asha at [facility] this morning. She will present the patient at her 9:00 am meeting and will contact ADAN  with the outcome.  Yaa at [facility] is to discuss the patient's wound care needs with her DON this morning and get back to ADAN.

## 2022-06-23 NOTE — ASSESSMENT & PLAN NOTE
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  Required RLQ drain for some time.  Recent ct abdomen 6/6 showed post operative changes without a large focal fluid collection.  Daily wound care  - patient wants to go home, trying to arrange home health, previous [employer] company not able to take her back  - patient advised to learn wound care as will have to do by herself  - follow up with wound care ostomy clinic on dc and surgeon.

## 2022-06-23 NOTE — ASSESSMENT & PLAN NOTE
Acute on chronic combines systolic and diastolic HF   Echo  5/24/2022  Summary  · The left ventricle is severely enlarged with eccentric hypertrophy and severely decreased systolic function.  · Severe left atrial enlargement.  · Grade III left ventricular diastolic dysfunction.  · Normal right ventricular size with normal right ventricular systolic function.  · Mild-to-moderate mitral regurgitation.  · Mild tricuspid regurgitation.  · There is mild pulmonary hypertension.  - CHF pathways initiated   - monitor during diuresis     -  20 mg lasix oral daily with 12.5 mg aldactone  - has leg swelling, will give lasix 20 mg iv bid , dc iv vanc

## 2022-06-23 NOTE — NURSING
Received report from JUAN Jeong. Pt sitting in chair alert and oriented x 4. Pt in smiling and denies pain. Call light in reach.

## 2022-06-23 NOTE — ASSESSMENT & PLAN NOTE
Received emailed referral from [facility] for Dr Langley for eval & recommendations of this patient with (LGL) Lymphocytosis.    In case it was possible, asked pt if she can do telemed (lives in [place]).    Pt declined --- she prefers face-to-face for initial visit.    Scheduled 9/15 with Dr Coleman / Dr Langley    Mailed appointment reminder & directions.  Will order slides.    Emailed [facility] navigator (Kris KENNEDY) of apptmnt date.  Offered sooner appt but with another provider.   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  Required RLQ drain for some time.  Recent ct abdomen 6/6 showed post operative changes without a large focal fluid collection.  Daily wound care

## 2022-06-23 NOTE — ASSESSMENT & PLAN NOTE
Patient with new eruption of pustules over her abdomen.  Wound care nurse had noted coccygeal DTI on presentation as well as 2 pustules on her buttocks.  Now with new pustules on her abdomen.  Cause of this is unclear, although appears to be a Staph infection.  Started vancomycin.  Surgery following, unclear how this is related to the recent surgery/ostomy, although there is some wound dehiscence that looks unrelated to the skin infection.  Dr. Cardozo was the CRS on her case and has visited her here, see his note.  Having good improvement on the antibiotics with daily wound care.Dc vanc and start doxycycline.

## 2022-06-23 NOTE — PROGRESS NOTES
Pharmacy To Dose- Vancomycin    Pharmacy to dose vancomycin consult discontinued by provider. Pharmacy will sign off. Please re-consult if clinically appropriate.    Susanna Medina, PharmD  [phone]

## 2022-06-23 NOTE — SUBJECTIVE & OBJECTIVE
Interval History:  less swelling in legs, healing lesions on abdomen but still has draining mid abdominal wound.  Review of Systems   Constitutional:  Negative for chills and fever.   Respiratory:  Negative for cough and shortness of breath.    Cardiovascular:  Positive for leg swelling. Negative for chest pain and palpitations.   Skin:  Positive for rash and wound.        Painful draining lesions scattered over abdomen.     Objective:     Vital Signs (Most Recent):  Temp: 97.7 °F (36.5 °C) (06/22/22 1946)  Pulse: 73 (06/22/22 1946)  Resp: 18 (06/22/22 2007)  BP: (!) 101/59 (06/22/22 1946)  SpO2: 99 % (06/22/22 1946)   Vital Signs (24h Range):  Temp:  [97.4 °F (36.3 °C)-98.2 °F (36.8 °C)] 97.7 °F (36.5 °C)  Pulse:  [72-85] 73  Resp:  [14-18] 18  SpO2:  [94 %-100 %] 99 %  BP: ()/(46-61) 101/59     Weight: 52.1 kg (114 lb 13.8 oz)  Body mass index is 20.35 kg/m².    Intake/Output Summary (Last 24 hours) at 6/22/2022 2013  Last data filed at 6/22/2022 1800  Gross per 24 hour   Intake 1320 ml   Output 100 ml   Net 1220 ml        Physical Exam  Constitutional:       General: She is not in acute distress.  Eyes:      Extraocular Movements: Extraocular movements intact.      Pupils: Pupils are equal, round, and reactive to light.   Cardiovascular:      Rate and Rhythm: Normal rate and regular rhythm.      Heart sounds: No murmur heard.    No gallop.   Pulmonary:      Effort: Pulmonary effort is normal.      Breath sounds: Normal breath sounds.   Abdominal:      General: Bowel sounds are normal.      Palpations: Abdomen is soft.      Tenderness: There is no abdominal tenderness.      Comments: Midline incision with areas of dehiscence.  Dressings over multiple pustular lesions including adjacent to ostomy bag, drying up.   Musculoskeletal:         General: Swelling present. Normal range of motion.      Cervical back: Normal range of motion and neck supple.   Neurological:      General: No focal deficit present.       Mental Status: She is alert and oriented to person, place, and time.   Psychiatric:         Mood and Affect: Mood normal.         Behavior: Behavior normal.       Significant Labs: All pertinent labs within the past 24 hours have been reviewed.    Significant Imaging: I have reviewed all pertinent imaging results/findings within the past 24 hours.

## 2022-06-23 NOTE — ASSESSMENT & PLAN NOTE
Acute on chronic combines systolic and diastolic HF   Echo  5/24/2022  Summary  · The left ventricle is severely enlarged with eccentric hypertrophy and severely decreased systolic function.  · Severe left atrial enlargement.  · Grade III left ventricular diastolic dysfunction.  · Normal right ventricular size with normal right ventricular systolic function.  · Mild-to-moderate mitral regurgitation.  · Mild tricuspid regurgitation.  · There is mild pulmonary hypertension.  - CHF pathways initiated   - monitor during diuresis     -  20 mg lasix oral daily with 12.5 mg aldactone  - has leg swelling, will give lasix 20 mg iv again

## 2022-06-23 NOTE — PLAN OF CARE
[facility] ([facility])    HOME HEALTH ORDERS  FACE TO FACE ENCOUNTER    Patient Name: Odette Hart  YOB: 1955    PCP: Braxton Barragan MD   PCP Address: [address] / [place] [address]  PCP Phone Number: [phone]  PCP Fax: [phone]    Encounter Date: 06/23/2022    Admit to Home Health    Diagnoses:  Active Hospital Problems    Diagnosis  POA    *Acute on chronic congestive heart failure [I50.9]  Yes    Cellulitis of abdominal wall [L03.311]  No    Superficial dehiscence of operation wound [T81.31XA]  Yes     Status post colectomy 1 month ago      Increased nutritional needs [R63.8]  Yes    CAD (coronary artery disease) [I25.10]  Yes    Debility [R53.81]  Yes    Anemia [D64.9]  Yes    Primary hypertension [I10]  Yes    Ischemic cardiomyopathy [I25.5]  Yes      Resolved Hospital Problems    Diagnosis Date Resolved POA    Chronic hypoxemic respiratory failure [J96.11] 06/09/2022 Yes    Acute cystitis without hematuria [N30.00] 06/13/2022 Yes       Future Appointments   Date Time Provider Department Center   7/1/2022 10:20 AM Rafa Cardozo MD [facility]   7/7/2022  2:30 PM Freddy Arenas MD [facility]           I have seen and examined this patient face to face today. My clinical findings that support the need for the home health skilled services and home bound status are the following:  Weakness/numbness causing balance and gait disturbance due to Surgery making it taxing to leave home.    Allergies:  Review of patient's allergies indicates:   Allergen Reactions    Augmentin [amoxicillin-pot clavulanate] Swelling     Not allergic to amoxicillin just a derivative of augmentin    Lisinopril Other (See Comments)     Fluid around heart    Losartan Other (See Comments)     High potassium    Shellfish containing products Anaphylaxis     Pt.states she is allergic to SEAFOOD since the age of 13    Levaquin [levofloxacin] Other  (See Comments)     Very bad joint pain    Clindamycin Palpitations     Chest pain       Diet: cardiac diet, fluid restriction 1500 cc a day    Activities: activity as tolerated    Nursing:   SN to complete comprehensive assessment including routine vital signs. Instruct on disease process and s/s of complications to report to MD. Review/verify medication list sent home with the patient at time of discharge  and instruct patient/caregiver as needed. Frequency may be adjusted depending on start of care date.    Notify MD if SBP > 160 or < 90; DBP > 90 or < 50; HR > 120 or < 50; Temp > 101      CONSULTS:    Physical Therapy to evaluate and treat. Evaluate for home safety and equipment needs; Establish/upgrade home exercise program. Perform / instruct on therapeutic exercises, gait training, transfer training, and Range of Motion.  Occupational Therapy to evaluate and treat. Evaluate home environment for safety and equipment needs. Perform/Instruct on transfers, ADL training, ROM, and therapeutic exercises.   to evaluate for community resources/long-range planning.  Aide to provide assistance with personal care, ADLs, and vital signs.    MISCELLANEOUS CARE:  Heart Failure:      SN to instruct on the following:    Instruct on the definition of CHF.   Instruct on the signs/sympoms of CHF to be reported.   Instruct on and monitor daily weights.   Instruct on factors that cause exacerbation.   Instruct on action, dose, schedule, and side effects of medications.   Instruct on diet as prescribed.   Instruct on activity allowed.   Instruct on life-style modifications for life long management of CHF   SN to assess compliance with daily weights, diet, medications, fluid retention,    safety precautions, activities permitted and life-style modifications.   Additional 1-2 SN visits per week as needed for signs and symptoms     of CHF exacerbation.      For Weight Gain > 2-3 lbs in 1 day or 4-6 lbs over 1 week notify  PCP:      WOUND CARE ORDERS  Clean abdominal wounds with Vashe. Apply Triad to all slough filled openings , pack a small thin ribbon of Aquacel AG into umbilicus wound and cover with [place] dressings.Change daily.    cleanse sacral ulcer wound with sterile normal saline. Apply skin prep to periwound. Apply Santyl ointment (collagenase), nickel-thick, to wound bed and cover with sacral mepilex, change daily      left arm skin tear- clean with wound cleanser and apply mepilex border dressing twice weekly    Medications: Review discharge medications with patient and family and provide education.      Current Discharge Medication List      START taking these medications    Details   collagenase (SANTYL) ointment Apply topically once daily.  Refills: 0      doxycycline (VIBRAMYCIN) 100 MG Cap Take 1 capsule (100 mg total) by mouth every 12 (twelve) hours. for 7 days  Qty: 14 capsule, Refills: 0      traMADoL (ULTRAM) 50 mg tablet Take 1 tablet (50 mg total) by mouth every 6 (six) hours as needed for Pain.    Comments: Quantity prescribed more than 7 day supply? No         CONTINUE these medications which have NOT CHANGED    Details   acetaminophen (TYLENOL) 500 MG tablet Take 2 tablets (1,000 mg total) by mouth every 8 (eight) hours as needed for Pain.  Refills: 0      albuterol (PROVENTIL/VENTOLIN HFA) 90 mcg/actuation inhaler Inhale 2 puffs into the lungs every 6 (six) hours as needed for Wheezing. Rescue  Qty: 18 g, Refills: 1    Associated Diagnoses: Bronchitis      ALPRAZolam (XANAX) 0.5 MG tablet Take 1 tablet (0.5 mg total) by mouth nightly as needed for Anxiety.  Qty: 30 tablet, Refills: 2    Associated Diagnoses: Anxiety      aspirin (ECOTRIN) 81 MG EC tablet Take 1 tablet (81 mg total) by mouth once daily.  Qty: 90 tablet, Refills: 0      carvediloL (COREG) 3.125 MG tablet Take 1 tablet (3.125 mg total) by mouth 2 (two) times daily with meals.  Qty: 60 tablet, Refills: 0    Comments: .      clopidogreL  (PLAVIX) 75 mg tablet Take 1 tablet (75 mg total) by mouth once daily.  Qty: 30 tablet, Refills: 0      ezetimibe (ZETIA) 10 mg tablet Take 1 tablet (10 mg total) by mouth every evening.  Qty: 90 tablet, Refills: 3      fluticasone propionate (FLONASE) 50 mcg/actuation nasal spray 1 spray (50 mcg total) by Each Nostril route once daily.  Qty: 9.9 mL, Refills: 1    Associated Diagnoses: Sinusitis, unspecified chronicity, unspecified location      furosemide (LASIX) 20 MG tablet Take 1 tablet (20 mg total) by mouth once daily.  Qty: 30 tablet, Refills: 11      nitroGLYCERIN (NITROSTAT) 0.4 MG SL tablet Place 1 tablet (0.4 mg total) under the tongue every 5 (five) minutes as needed for Chest pain.  Qty: 25 tablet, Refills: 0      polyethylene glycol (GLYCOLAX) 17 gram PwPk Take 17 g by mouth once daily.  Qty: 1 packet, Refills: 0      simethicone (MYLICON) 80 MG chewable tablet Take 80 mg by mouth every 8 (eight) hours as needed.      spironolactone (ALDACTONE) 25 MG tablet Take 0.5 tablets (12.5 mg total) by mouth once daily.  Qty: 15 tablet, Refills: 11    Comments: .         STOP taking these medications       HYDROcodone-acetaminophen (NORCO) 5-325 mg per tablet Comments:   Reason for Stopping:         Lactobacillus rhamnosus GG (CULTURELLE) 10 billion cell capsule Comments:   Reason for Stopping:         metoprolol succinate (TOPROL-XL) 25 MG 24 hr tablet Comments:   Reason for Stopping:         pantoprazole (PROTONIX) 40 MG tablet Comments:   Reason for Stopping:         prasugreL (EFFIENT) 10 mg Tab Comments:   Reason for Stopping:               I certify that this patient is confined to her home and needs intermittent skilled nursing care, physical therapy and occupational therapy.        Sanjuanita Briones MD  06/23/2022

## 2022-06-23 NOTE — PLAN OF CARE
Home health referral sent to N via [facility]. Pending home health orders. SW Pat to follow up with N regarding previous home health agency.   06/23/22 1042   Post-Acute Status   Post-Acute Authorization Home Health   Post-Acute Placement Status Referrals Sent   Home Health Status   (home health consult sent, pending home health orders)   Discharge Plan   Discharge Plan A Home Health   Discharge Plan B Home with family;Home

## 2022-06-23 NOTE — ASSESSMENT & PLAN NOTE
- fall precautions   - She will need SNF due to need for daily wound care.  - ambulating well with walker

## 2022-06-23 NOTE — PROGRESS NOTES
Pharmacokinetic Assessment Follow Up: IV Vancomycin    Vancomycin serum concentration assessment(s):    The trough level was drawn correctly and can be used to guide therapy at this time. The measurement is within the desired definitive target range of 10 to 20 mcg/mL.    Vancomycin Regimen Plan:    Continue regimen of Vancomycin 750 mg IV every 12 hours with next serum trough concentration measured at 1100 prior to 1130 dose on 6/25/22    Drug levels (last 3 results):  Recent Labs   Lab Result Units 06/21/22  0914 06/23/22  0929   Vancomycin-Trough ug/mL 19.4 17.2       Pharmacy will continue to follow and monitor vancomycin.    Please contact pharmacy at extension [phone] for questions regarding this assessment.    Thank you for the consult,   Pallavi Pearl       Patient brief summary:  Odette Hart is a 66 y.o. female initiated on antimicrobial therapy with IV Vancomycin for treatment of skin & soft tissue infection    The patient's current regimen is vancomycin 750 mg IV q12h    Drug Allergies:   Review of patient's allergies indicates:   Allergen Reactions    Augmentin [amoxicillin-pot clavulanate] Swelling     Not allergic to amoxicillin just a derivative of augmentin    Lisinopril Other (See Comments)     Fluid around heart    Losartan Other (See Comments)     High potassium    Shellfish containing products Anaphylaxis     Pt.states she is allergic to SEAFOOD since the age of 13    Levaquin [levofloxacin] Other (See Comments)     Very bad joint pain    Clindamycin Palpitations     Chest pain       Actual Body Weight:   52.1 kg    Renal Function:   Estimated Creatinine Clearance: 65.9 mL/min (based on SCr of 0.7 mg/dL).,     Dialysis Method (if applicable):  N/A    CBC (last 72 hours):  No results for input(s): WHITE BLOOD CELL COUNT, HEMOGLOBIN, HEMATOCRIT, PLATELETS, GRAN%, LYMPH%, MONO%, EOSINOPHIL%, BASOPHIL%, DIFFERENTIAL METHOD in the last 72 hours.    Metabolic Panel (last 72 hours):  Recent Labs    Lab Result Units 06/23/22  0929   Creatinine mg/dL 0.7       Vancomycin Administrations:  vancomycin given in the last 96 hours                     vancomycin 750 mg in dextrose 5 % 250 mL IVPB (ready to mix system) (mg) 750 mg New Bag 06/23/22 1138     750 mg New Bag 06/22/22 2236     750 mg New Bag  1020     750 mg New Bag 06/21/22 2208     750 mg New Bag  0959     750 mg New Bag 06/20/22 2206     750 mg New Bag  1202     750 mg New Bag 06/19/22 2330                    Microbiologic Results:  Microbiology Results (last 7 days)       ** No results found for the last 168 hours. **

## 2022-06-23 NOTE — SUBJECTIVE & OBJECTIVE
Interval History:  less swelling in legs, healing lesions on abdomen but still has draining mid abdominal wound.  Review of Systems   Constitutional:  Negative for chills and fever.   Respiratory:  Negative for cough and shortness of breath.    Cardiovascular:  Positive for leg swelling. Negative for chest pain and palpitations.   Skin:  Positive for rash and wound.        Painful draining lesions scattered over abdomen.     Objective:     Vital Signs (Most Recent):  Temp: 98.1 °F (36.7 °C) (06/23/22 1117)  Pulse: 74 (06/23/22 1117)  Resp: 18 (06/23/22 1117)  BP: (!) 83/40 (06/23/22 1117)  SpO2: 97 % (06/23/22 1117)   Vital Signs (24h Range):  Temp:  [97.7 °F (36.5 °C)-98.1 °F (36.7 °C)] 98.1 °F (36.7 °C)  Pulse:  [64-78] 74  Resp:  [18] 18  SpO2:  [96 %-99 %] 97 %  BP: ()/(40-66) 83/40     Weight: 52.1 kg (114 lb 13.8 oz)  Body mass index is 20.35 kg/m².    Intake/Output Summary (Last 24 hours) at 6/23/2022 1530  Last data filed at 6/23/2022 1000  Gross per 24 hour   Intake 960 ml   Output 100 ml   Net 860 ml        Physical Exam  Constitutional:       General: She is not in acute distress.  Eyes:      Extraocular Movements: Extraocular movements intact.      Pupils: Pupils are equal, round, and reactive to light.   Cardiovascular:      Rate and Rhythm: Normal rate and regular rhythm.      Heart sounds: No murmur heard.    No gallop.   Pulmonary:      Effort: Pulmonary effort is normal.      Breath sounds: Normal breath sounds.   Abdominal:      General: Bowel sounds are normal.      Palpations: Abdomen is soft.      Tenderness: There is no abdominal tenderness.      Comments: Midline incision with areas of dehiscence.  Dressings over multiple pustular lesions including adjacent to ostomy bag, drying up.   Musculoskeletal:         General: Swelling present. Normal range of motion.      Cervical back: Normal range of motion and neck supple.   Neurological:      General: No focal deficit present.      Mental  Status: She is alert and oriented to person, place, and time.   Psychiatric:         Mood and Affect: Mood normal.         Behavior: Behavior normal.       Significant Labs: All pertinent labs within the past 24 hours have been reviewed.    Significant Imaging: I have reviewed all pertinent imaging results/findings within the past 24 hours.

## 2022-06-23 NOTE — PROGRESS NOTES
RADHA met with patient, patient has no additional needs.    Follow Up visit scheduled by RADHA Yen

## 2022-06-23 NOTE — PROGRESS NOTES
"[facility] Medicine  Progress Note    Patient Name: Odette Hart  MRN: [id number]  Patient Class: IP- Inpatient   Admission Date: 6/8/2022  Length of Stay: 14 days  Attending Physician: Sanjuanita Briones MD  Primary Care Provider: Braxton Barragan MD        Subjective:     Principal Problem:Acute on chronic congestive heart failure        HPI:  From H&P by Martha Hameed PA:  "Ms. Odette Hart is a 66 y.o. 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 She notes sudden onset a few days ago, with worsening tonight. She states this is worse with exertion. She also notes pain around her colostomy site over the past several days. She was seen in the ED due to this pain on 6/6/22 and was diagnosed with a UTI, and discharged on Macrobid. She states she had two episodes of emesis yesterday. She was evaluated in the ED with labs showing anemia with H&H of 10.9/33.1, ALP of 138, BNP of >4900. CXR showed prominent pulmonary vasculature, with interstitial and patchy alveolar infiltrates, and without pleural effusion. She was treated in the ED with 40 mg IV lasix. She was admitted to inpatient status."      Overview/Hospital Course:  Patient diuresed with improvement in her shortness of breath and lower extremity swelling.  Able to tolerate her diet.  She was seen by surgery and wound care ordered to evaluate the ostomy and midline incision with dehisced areas.  In addition to dehiscence the wound care nurse noted multiple pustular " lesions scattered on the patient's buttocks and abdomen that were new.  She was started on IV vancomycin on 6/15 with some improvement.  Surgery re-evaluated and recommended continued wound care and antibiotics.  As she will need daily wound care SNF was consulted.      Interval History:  less swelling in legs, healing lesions on abdomen but still has draining mid abdominal wound.  Review of Systems   Constitutional:  Negative for chills and fever.   Respiratory:  Negative for cough and shortness of breath.    Cardiovascular:  Positive for leg swelling. Negative for chest pain and palpitations.   Skin:  Positive for rash and wound.        Painful draining lesions scattered over abdomen.     Objective:     Vital Signs (Most Recent):  Temp: 97.7 °F (36.5 °C) (06/22/22 1946)  Pulse: 73 (06/22/22 1946)  Resp: 18 (06/22/22 2007)  BP: (!) 101/59 (06/22/22 1946)  SpO2: 99 % (06/22/22 1946)   Vital Signs (24h Range):  Temp:  [97.4 °F (36.3 °C)-98.2 °F (36.8 °C)] 97.7 °F (36.5 °C)  Pulse:  [72-85] 73  Resp:  [14-18] 18  SpO2:  [94 %-100 %] 99 %  BP: ()/(46-61) 101/59     Weight: 52.1 kg (114 lb 13.8 oz)  Body mass index is 20.35 kg/m².    Intake/Output Summary (Last 24 hours) at 6/22/2022 2013  Last data filed at 6/22/2022 1800  Gross per 24 hour   Intake 1320 ml   Output 100 ml   Net 1220 ml        Physical Exam  Constitutional:       General: She is not in acute distress.  Eyes:      Extraocular Movements: Extraocular movements intact.      Pupils: Pupils are equal, round, and reactive to light.   Cardiovascular:      Rate and Rhythm: Normal rate and regular rhythm.      Heart sounds: No murmur heard.    No gallop.   Pulmonary:      Effort: Pulmonary effort is normal.      Breath sounds: Normal breath sounds.   Abdominal:      General: Bowel sounds are normal.      Palpations: Abdomen is soft.      Tenderness: There is no abdominal tenderness.      Comments: Midline incision with areas of dehiscence.  Dressings over  multiple pustular lesions including adjacent to ostomy bag, drying up.   Musculoskeletal:         General: Swelling present. Normal range of motion.      Cervical back: Normal range of motion and neck supple.   Neurological:      General: No focal deficit present.      Mental Status: She is alert and oriented to person, place, and time.   Psychiatric:         Mood and Affect: Mood normal.         Behavior: Behavior normal.       Significant Labs: All pertinent labs within the past 24 hours have been reviewed.    Significant Imaging: I have reviewed all pertinent imaging results/findings within the past 24 hours.      Assessment/Plan:      * Acute on chronic congestive heart failure  Acute on chronic combines systolic and diastolic HF   Echo  5/24/2022  Summary  · The left ventricle is severely enlarged with eccentric hypertrophy and severely decreased systolic function.  · Severe left atrial enlargement.  · Grade III left ventricular diastolic dysfunction.  · Normal right ventricular size with normal right ventricular systolic function.  · Mild-to-moderate mitral regurgitation.  · Mild tricuspid regurgitation.  · There is mild pulmonary hypertension.  - CHF pathways initiated   - monitor during diuresis     -  20 mg lasix oral daily with 12.5 mg aldactone  - has leg swelling, will give lasix 20 mg iv again    Cellulitis of abdominal wall  Patient with new eruption of pustules over her abdomen.  Wound care nurse had noted coccygeal DTI on presentation as well as 2 pustules on her buttocks.  Now with new pustules on her abdomen.  Cause of this is unclear, although appears to be a Staph infection.  Started vancomycin.  Surgery following, unclear how this is related to the recent surgery/ostomy, although there is some wound dehiscence that looks unrelated to the skin infection.  Dr. Cardozo was the CRS on her case and has visited her here, see his note.  Having good improvement on the antibiotics with daily wound  care.    Superficial dehiscence of operation wound  appr sx recs  Wound care  Had leaking ostomy at home, doing OK here  Had both a colo-vaginal and colo-vesical fistula prior to surgery, repaired.  Required RLQ drain for some time.  Recent ct abdomen 6/6 showed post operative changes without a large focal fluid collection.  Daily wound care    Increased nutritional needs  Continue diet and supplements      CAD (coronary artery disease)  - continue Plavix , asa, zetia  - 1/24/2022: [facility]: Cath: LAD: Proximal stent patent. LCX: Proximal 80%. LCX: Dominant. Moderate disease. LCX: HEVER 2.75 x 18 mm. Distal dissection. HEVER 2.75 x 22 mm.       Debility  - fall precautions   - She will need SNF due to need for daily wound care.    Anemia  - H&H appear to be at approximate baseline   - monitor       Primary hypertension  - mildly hypotensive with preserved MAP at time of admission   - home meds: carvedilol 3.125 mg BID, lasix 20 mg QD, spironolactone 12.5 mg QD  - monitor     Ischemic cardiomyopathy  - history noted   - s/p ICD placement         VTE Risk Mitigation (From admission, onward)         Ordered     enoxaparin injection 40 mg  Daily         06/08/22 1122     IP VTE HIGH RISK PATIENT  Once         06/08/22 0253     Place sequential compression device  Until discontinued         06/08/22 0253     Place sequential compression device  Until discontinued         06/08/22 0252                Discharge Planning   SIVAN:      Code Status: Full Code   Is the patient medically ready for discharge?:     Reason for patient still in hospital (select all that apply): Patient trending condition and Treatment  Discharge Plan A: Skilled Nursing Facility   Discharge Delays: None known at this time              Sanjuanita Briones MD  Department of Hospital Medicine   [facility] Surg ([facility])

## 2022-06-23 NOTE — PROGRESS NOTES
"[facility] Medicine  Progress Note    Patient Name: Odette Hart  MRN: [id number]  Patient Class: IP- Inpatient   Admission Date: 6/8/2022  Length of Stay: 15 days  Attending Physician: Sanjuanita Briones MD  Primary Care Provider: Braxton Barragan MD        Subjective:     Principal Problem:Acute on chronic congestive heart failure        HPI:  From H&P by Martha Hameed PA:  "Ms. Odette Hart is a 66 y.o. 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 She notes sudden onset a few days ago, with worsening tonight. She states this is worse with exertion. She also notes pain around her colostomy site over the past several days. She was seen in the ED due to this pain on 6/6/22 and was diagnosed with a UTI, and discharged on Macrobid. She states she had two episodes of emesis yesterday. She was evaluated in the ED with labs showing anemia with H&H of 10.9/33.1, ALP of 138, BNP of >4900. CXR showed prominent pulmonary vasculature, with interstitial and patchy alveolar infiltrates, and without pleural effusion. She was treated in the ED with 40 mg IV lasix. She was admitted to inpatient status."      Overview/Hospital Course:  Patient diuresed with improvement in her shortness of breath and lower extremity swelling.  Able to tolerate her diet.  She was seen by surgery and wound care ordered to evaluate the ostomy and midline incision with dehisced areas.  In addition to dehiscence the wound care nurse noted multiple pustular " lesions scattered on the patient's buttocks and abdomen that were new.  She was started on IV vancomycin on 6/15 with some improvement.  Surgery re-evaluated and recommended continued wound care and antibiotics.  As she will need daily wound care SNF was consulted.Patient with accepting facility but she refuses to go.      Interval History:  less swelling in legs, healing lesions on abdomen but still has draining mid abdominal wound.  Review of Systems   Constitutional:  Negative for chills and fever.   Respiratory:  Negative for cough and shortness of breath.    Cardiovascular:  Positive for leg swelling. Negative for chest pain and palpitations.   Skin:  Positive for rash and wound.        Painful draining lesions scattered over abdomen.     Objective:     Vital Signs (Most Recent):  Temp: 98.1 °F (36.7 °C) (06/23/22 1117)  Pulse: 74 (06/23/22 1117)  Resp: 18 (06/23/22 1117)  BP: (!) 83/40 (06/23/22 1117)  SpO2: 97 % (06/23/22 1117)   Vital Signs (24h Range):  Temp:  [97.7 °F (36.5 °C)-98.1 °F (36.7 °C)] 98.1 °F (36.7 °C)  Pulse:  [64-78] 74  Resp:  [18] 18  SpO2:  [96 %-99 %] 97 %  BP: ()/(40-66) 83/40     Weight: 52.1 kg (114 lb 13.8 oz)  Body mass index is 20.35 kg/m².    Intake/Output Summary (Last 24 hours) at 6/23/2022 1530  Last data filed at 6/23/2022 1000  Gross per 24 hour   Intake 960 ml   Output 100 ml   Net 860 ml        Physical Exam  Constitutional:       General: She is not in acute distress.  Eyes:      Extraocular Movements: Extraocular movements intact.      Pupils: Pupils are equal, round, and reactive to light.   Cardiovascular:      Rate and Rhythm: Normal rate and regular rhythm.      Heart sounds: No murmur heard.    No gallop.   Pulmonary:      Effort: Pulmonary effort is normal.      Breath sounds: Normal breath sounds.   Abdominal:      General: Bowel sounds are normal.      Palpations: Abdomen is soft.      Tenderness: There is no abdominal tenderness.      Comments: Midline  incision with areas of dehiscence.  Dressings over multiple pustular lesions including adjacent to ostomy bag, drying up.   Musculoskeletal:         General: Swelling present. Normal range of motion.      Cervical back: Normal range of motion and neck supple.   Neurological:      General: No focal deficit present.      Mental Status: She is alert and oriented to person, place, and time.   Psychiatric:         Mood and Affect: Mood normal.         Behavior: Behavior normal.       Significant Labs: All pertinent labs within the past 24 hours have been reviewed.    Significant Imaging: I have reviewed all pertinent imaging results/findings within the past 24 hours.      Assessment/Plan:      * Acute on chronic congestive heart failure  Acute on chronic combines systolic and diastolic HF   Echo  5/24/2022  Summary  · The left ventricle is severely enlarged with eccentric hypertrophy and severely decreased systolic function.  · Severe left atrial enlargement.  · Grade III left ventricular diastolic dysfunction.  · Normal right ventricular size with normal right ventricular systolic function.  · Mild-to-moderate mitral regurgitation.  · Mild tricuspid regurgitation.  · There is mild pulmonary hypertension.  - CHF pathways initiated   - monitor during diuresis     -  20 mg lasix oral daily with 12.5 mg aldactone  - has leg swelling, will give lasix 20 mg iv bid , dc iv vanc    Cellulitis of abdominal wall  Patient with new eruption of pustules over her abdomen.  Wound care nurse had noted coccygeal DTI on presentation as well as 2 pustules on her buttocks.  Now with new pustules on her abdomen.  Cause of this is unclear, although appears to be a Staph infection.  Started vancomycin.  Surgery following, unclear how this is related to the recent surgery/ostomy, although there is some wound dehiscence that looks unrelated to the skin infection.  Dr. Cardozo was the CRS on her case and has visited her here, see his note.   Having good improvement on the antibiotics with daily wound care.Dc vanc and start doxycycline.    Superficial dehiscence of operation wound  appr sx recs  Wound care  Had leaking ostomy at home, doing OK here  Had both a colo-vaginal and colo-vesical fistula prior to surgery, repaired.  Required RLQ drain for some time.  Recent ct abdomen 6/6 showed post operative changes without a large focal fluid collection.  Daily wound care  - patient wants to go home, trying to arrange home health, previous [employer] company not able to take her back  - patient advised to learn wound care as will have to do by herself  - follow up with wound care ostomy clinic on dc and surgeon.      Increased nutritional needs  Continue diet and supplements      CAD (coronary artery disease)  - continue Plavix , asa, zetia  - 1/24/2022: [facility]: Cath: LAD: Proximal stent patent. LCX: Proximal 80%. LCX: Dominant. Moderate disease. LCX: HEVER 2.75 x 18 mm. Distal dissection. HEVER 2.75 x 22 mm.       Debility  - fall precautions   - She will need SNF due to need for daily wound care.  - ambulating well with walker    Anemia  - H&H appear to be at approximate baseline   - monitor       Primary hypertension  - mildly hypotensive with preserved MAP at time of admission   - home meds: carvedilol 3.125 mg BID, lasix 20 mg QD, spironolactone 12.5 mg QD  - monitor     Ischemic cardiomyopathy  - history noted   - s/p ICD placement         VTE Risk Mitigation (From admission, onward)         Ordered     enoxaparin injection 40 mg  Daily         06/08/22 1122     IP VTE HIGH RISK PATIENT  Once         06/08/22 0253     Place sequential compression device  Until discontinued         06/08/22 0253     Place sequential compression device  Until discontinued         06/08/22 0252                Discharge Planning   SIVAN:      Code Status: Full Code   Is the patient medically ready for discharge?:     Reason for patient still in hospital (select all that  apply):treatment  Discharge Plan A: Home Health   Discharge Delays: None known at this time              Sanjuanita Briones MD  Department of Hospital Medicine   [facility] Med Surg [facility])

## 2022-06-24 NOTE — PROGRESS NOTES
MALKAW met with patient and patient has no additional needs. Discharge pending home health acceptance.     RADHA Madrigal

## 2022-06-24 NOTE — PROGRESS NOTES
ADAN called Susanna Moreira at [facility], but she left for the day at 12 pm.  ADAN tried to reach Camilo  or Lori but neither answered. ADAN spoke maurice Gruber at [facility] who said that she would work on getting the patient HH. ADAN told her the names of those agencies that had already turning the referral down. She is to call ADAN back in a short while.

## 2022-06-24 NOTE — PROGRESS NOTES
SW is currently waiting for [facility] to review patient's information, and decide if they will accept the patient.  ADAN contacted [facility], [facility], and [facility]. They all refused the referral. The Medical Team responded that they were unable to take the patient through [facility].  SW called [facility], but they refused the Referral  t

## 2022-06-24 NOTE — PLAN OF CARE
ALECIA spoke with Sabrina at [facility] [phone] and asked her about possibly taking the patient for 2-3 times a week.  Sabrina said that she will check with their  and call me back.  She also suggested that I search for other  agencies for her.  ALECIA will look for other agencies.

## 2022-06-24 NOTE — PLAN OF CARE
ALECIA sent Elaine Lyle, wound care RN for [facility], a message at the suggestion of Dr. Briones.  Dr. Briones wants to check to see if Elaine might be able to help patient Tanner with her outpatient wound care once a week.  However, Elaine is out of the office until June 29th.  Dr. Briones also mentioned that patient spoke with her surgeon who may be able to care for her wound once a week.  Dr. Briones feels that if patient can get wound care twice a week, one day with her surgeon and possibly one day with Elaine, maybe patient would be able to handle the other days of wound care by herself.  CM will follow up with patient.    ALECIA spoke with patient. She stated that she likes the idea but also wants me to check back with [facility] to see if they'd take her back 2-3 times per week if the plan stated above can be confirmed.  ALECIA called [facility] [phone], however the office was not open at the time.  CM to call back today.

## 2022-06-24 NOTE — PROGRESS NOTES
ADAN spoke with July at [facility], who is covering for Sandra Moreira. She said that she has called all of the HH agencies in their network and NONE of them accepted the patient. They do not have nurses that cover the area in which the patient lives. ADAN suggested [facility]  that had covered that area in the past. ADAN spoke with Sadia at [facility] that said they do not take [facility] and if they make an out of network agreement, it takes [facility] weeks to send the ok so the patient doesn't get seen, which doesn't meet her needs anyway. ADAN also spoke to Jefferson at [facility], another CM who covers for Miguelina. He could only suggest that outpatient wound care might be an option for the patient. ADAN will follow up with this if no HH steps in.

## 2022-06-24 NOTE — PLAN OF CARE
This CM Director spoke with Radha at [facility]. Per Radha, Elaine is out of the office until 6/29.  Patient needs follow up for ostomy.  Radha sent a message to the doctor as high priority to get the patient scheduled.  The clinic will call the patient with the appointment.      Home health update: Case escalated to [facility] leadership for home health set up. Pending completion.    Dr. Briones, ALECIA Mckeon, and DAAN Holt updated.

## 2022-06-24 NOTE — PLAN OF CARE
Referrals sent to the following for home health:    [facility]:  1. [facility]  2. [facility]  3. [facility]    [facility]:   [facility]. [facility]  2. [facility]  3. [facility]    [facility]-denied    ADAN Holt and ALECIA Mckeon will continue to follow.

## 2022-06-24 NOTE — PLAN OF CARE
06/24/22 1449   Final Note   Assessment Type Final Discharge Note   Anticipated Discharge Disposition Home  (Patient is going home with the plan of [facility] establishing her with a home health agency.  However, as per Dr. Briones, pt is fine to go home now without home health.)   Hospital Resources/Appts/Education Provided Appointments scheduled and added to AVS   Post-Acute Status   Discharge Delays None known at this time       As per Dr. Briones, patient is fine to go home without home health considering [facility] will try and find her a  agency.

## 2022-06-24 NOTE — PLAN OF CARE
Received call from patient bedside RN inquiring of home health set up before discharge.  Verified [facility] patient portal patient is active with [facility] at the time of admission and current;  Called [facility] at [phone] spoke with after hours intake. Unable to confirm why patient has been denied resumption in care; plan to contact DON to find out why patient can not resume care at discharge. Pending call back from on [facility] nursing staff; updated Odette LEE    Received call back from Suri with [facility] stating unable to continue to provide care for patient in geographical area ([facility])

## 2022-06-24 NOTE — PLAN OF CARE
Pt had a good night, no significant events this shift. AAOx4. VSS. Poc reviewed with pt and questions answered. Repositions self independently. Pt is eating and tolerating diet well. 1500 fluid restriction maintained; intake-360cc this shift. Pt voiding adequately up to bathroom. Clear and yellow urine noted. Dressings to abdomen, CDI. Encouraged fluids and use of IS this shift. Pain is managed with prn p.o medication. Remains free from falls, injury, and skin breakdown. All needs and concerns met. Purposeful rounding done. Bed is locked and in lowest position, side rails up x2, call light in reach. Pt is resting comfortably at present. Will continue to monitor.

## 2022-06-25 NOTE — SIGNIFICANT EVENT
"Patient was to be discharged this evening. Lyft was ordered, but none were available for "a few hours." Patient expresses desire not to return home at the last hour. Discharge delayed until AM, at which time the patient will travel home with a friend by private vehilce   "

## 2022-06-25 NOTE — PROGRESS NOTES
Pt Pt AAOx4. VSS throughout shift. Poc reviewed with pt and questions answered. Discharge paperwork and scripts received and in possession. Pt verbalized knowledge of daily wound care routine and supplies given. IV no longer in place, removed yesterday by day nurse. Pt denies pain or discomfort. No distress noted. Pt escorted down with all belongings via wheelchair and transported home with JUAN Hill.

## 2022-06-25 NOTE — PROGRESS NOTES
Pt transported downstairs @ 1930 to wait on Lyft. Lyft not available and told wait would be a few hours before someone was available. Pt transported back to room and now refusing to leave this shift. Pt states she's uncomfortable leaving this time of night and with ADT 30. Called and informed TERESSA Thomas; verbalized understanding and will delay discharge until to tomorrow morning. Raz Gutierrez notified and aware of decision. Will continue to monitor.

## 2022-06-27 NOTE — PROGRESS NOTES
ADAN learned that [facility] has still been unable to staff the home health wound care ordered on Friday 6/24/22 for this patient. They were having a great deal of trouble attempting to schedule it then. Today, ADAN spoke with ANAM Pandey CM, who suggested outpatient wound care. ADAN was made aware that [facility] is in Network with [facility]  by Fabiola Lucio RN CM Director.   ADAN spoke with Krys at [facility]. She stated she could use the orders she already had in the patient's chart. The patient has an appointment for Thursday, (6/30/22 at 9:30 am).

## 2022-06-27 NOTE — PLAN OF CARE
6/27/2022:    Per ADAN Mckeon, still pending home health acceptance.  Closet outpatient wound care is [facility]. Order for wound care needed. Phone number for scheduling is [phone], fax [phone]. They are in network with [facility]. Discussed information with ADAN Guzman

## 2022-06-27 NOTE — PROGRESS NOTES
IP Liaison - Final Visit Note    Patient: Odette Hart  MRN:  [id number]  Date of Service:  6/27/2022  Completed by:  RADHA Madrigal    Reason for Visit   Patient presents with    IP Liaison Chart Review            Patient Summary     Discharge Date: 6/25/2022  Discharge telephone number/address: [phone]/[address] [place] [address]  Follow up provider: Braxton Barragan MD  Follow up appointments: 6/30/2022, 12:15pm  Home Health agency & telephone number: Do be determined by [facility]  DME ordered &  name: n/a  Assigned OPCM RN/SW: n/a  Report sent to follow up team (PCP/OPCM) via in basket message: n/a  Community Resources arranged including agency name & contact info: No support & services have been documented.        RADHA Madrigal

## 2022-06-28 NOTE — PROGRESS NOTES
Subjective:      Patient ID: Odette Hart is a 66 y.o. female.    Chief Complaint: No chief complaint on file.    HPI:    ROS Cardeeo remote AICD interrogation report downloaded and prepared by medical assistant and interpreted by me and full report scanned into Epic media.  Battery OK with JOSE 4.5 years.  Leads OK.  Nonsustained VT noted.  Normal device function    Past Medical History:   Diagnosis Date    Acute coronary syndrome     Acute exacerbation of chronic obstructive pulmonary disease (COPD) 3/23/2022    Allergy     Arthritis     Cardiomyopathy     CHF (congestive heart failure)     Coronary artery disease     Coronary artery disease of native artery of native heart with stable angina pectoris 2021: [facility]: Cath: LAD: Proximal stent patent. LCX: Proximal 80%. LCX: Dominant. Moderate disease. LCX: HEVER 2.75 x 18 mm. Distal dissection. HEVER 2.75 x 22 mm.     Diverticulitis     Diverticulosis     Familial hypercholesterolemia 2022    Former smoker 2022    Heart attack     Heart disease     History of myocardial infarction 2022    Hyperlipidemia     Hypertension     Hypertension 2022    ICD (implantable cardioverter-defibrillator) in place     Non-ST elevation myocardial infarction (NSTEMI) 2019        Past Surgical History:   Procedure Laterality Date    APPENDECTOMY N/A 2022    Procedure: APPENDECTOMY;  Surgeon: Rafa Cardozo MD;  Location: [facility] OR [address] [place] [address];  Service: Colon and Rectal;  Laterality: N/A;    ATRIAL CARDIAC PACEMAKER INSERTION      CECECTOMY N/A 2022    Procedure: EXCISION, CECUM;  Surgeon: Rafa Cardozo MD;  [facility];  Service: Colon and Rectal;  Laterality: N/A;     SECTION      CHOLECYSTECTOMY N/A 2022    Procedure: CHOLECYSTECTOMY;  Surgeon: Doug Epstein MD;  [facility];  Service: General;  Laterality: N/A;    COLONOSCOPY N/A 2022     Procedure: COLONOSCOPY;  Surgeon: Rafa Cardozo MD;  [facility];  Service: Colon and Rectal;  Laterality: N/A;    COLOSTOMY  2022    Procedure: CREATION, COLOSTOMY;  Surgeon: Rafa Cardozo MD;  [facility];  Service: Colon and Rectal;;    CORONARY STENT PLACEMENT      LEFT HEART CATHETERIZATION Left 2022    Procedure: CATHETERIZATION, HEART, LEFT;  Surgeon: Yohannes Cordova MD;  Location: [facility] CATH LAB;  Service: Cardiology;  Laterality: Left;       Family History   Problem Relation Age of Onset    Heart disease Mother     Emphysema Father     Heart attack Brother        Social History     Socioeconomic History    Marital status:    Tobacco Use    Smoking status: Former Smoker     Packs/day: 0.50     Start date: 1976     Quit date: 2012     Years since quittin.5    Smokeless tobacco: Never Used   Substance and Sexual Activity    Alcohol use: No    Drug use: No     Social Determinants of Health     Financial Resource Strain: Low Risk     Difficulty of Paying Living Expenses: Not hard at all   Food Insecurity: No Food Insecurity    Worried About Running Out of Food in the Last Year: Never true    Ran Out of Food in the Last Year: Never true   Transportation Needs: No Transportation Needs    Lack of Transportation (Medical): No    Lack of Transportation (Non-Medical): No   Physical Activity: Inactive    Days of Exercise per Week: 0 days    Minutes of Exercise per Session: 0 min   Stress: No Stress Concern Present    Feeling of Stress : Not at all   Social Connections: Socially Isolated    Frequency of Communication with Friends and Family: More than three times a week    Frequency of Social Gatherings with Friends and Family: Once a week    Attends Yazdanism Services: Never    Active Member of Clubs or Organizations: No    Attends Club or Organization Meetings: Never    Marital Status:    Housing Stability: Low Risk      Unable to Pay for Housing in the Last Year: No    Number of Places Lived in the Last Year: 1    Unstable Housing in the Last Year: No       Current Outpatient Medications on File Prior to Visit   Medication Sig Dispense Refill    acetaminophen (TYLENOL) 500 MG tablet Take 2 tablets (1,000 mg total) by mouth every 8 (eight) hours as needed for Pain.  0    albuterol (PROVENTIL/VENTOLIN HFA) 90 mcg/actuation inhaler Inhale 2 puffs into the lungs every 6 (six) hours as needed for Wheezing. Rescue 18 g 1    ALPRAZolam (XANAX) 0.5 MG tablet Take 1 tablet (0.5 mg total) by mouth nightly as needed for Anxiety. 30 tablet 2    aspirin (ECOTRIN) 81 MG EC tablet Take 1 tablet (81 mg total) by mouth once daily. 90 tablet 0    carvediloL (COREG) 3.125 MG tablet Take 1 tablet (3.125 mg total) by mouth 2 (two) times daily with meals. 60 tablet 1    clopidogreL (PLAVIX) 75 mg tablet Take 1 tablet (75 mg total) by mouth once daily. 30 tablet 1    collagenase (SANTYL) ointment Apply topically once daily. 90 g 1    docusate sodium (COLACE) 100 MG capsule Take 1 capsule (100 mg total) by mouth 2 (two) times daily. 60 capsule 1    doxycycline (VIBRAMYCIN) 100 MG Cap Take 1 capsule (100 mg total) by mouth every 12 (twelve) hours. for 7 days 14 capsule 0    ezetimibe (ZETIA) 10 mg tablet Take 1 tablet (10 mg total) by mouth every evening. 90 tablet 3    fluticasone propionate (FLONASE) 50 mcg/actuation nasal spray 1 spray (50 mcg total) by Each Nostril route once daily. 9.9 mL 1    furosemide (LASIX) 20 MG tablet Take 1 tablet (20 mg total) by mouth once daily. 30 tablet 1    nitroGLYCERIN (NITROSTAT) 0.4 MG SL tablet Place 1 tablet (0.4 mg total) under the tongue every 5 (five) minutes as needed for Chest pain. 25 tablet 0    polyethylene glycol (GLYCOLAX) 17 gram/dose powder Take 17 g by mouth once daily. 510 g 1    simethicone (MYLICON) 80 MG chewable tablet Take 80 mg by mouth every 8 (eight) hours as needed.       spironolactone (ALDACTONE) 25 MG tablet Take half a tablet (12.5 mg total) by mouth once daily. 15 tablet 1    traMADoL (ULTRAM) 50 mg tablet Take 1 tablet (50 mg total) by mouth every 8 (eight) hours as needed for Pain. 20 tablet 0    [DISCONTINUED] metoprolol succinate (TOPROL-XL) 25 MG 24 hr tablet Take 1 tablet (25 mg total) by mouth once daily. 1 tablet 0    [DISCONTINUED] pantoprazole (PROTONIX) 40 MG tablet Take 1 tablet (40 mg total) by mouth once daily. 30 tablet 1    [DISCONTINUED] prasugreL (EFFIENT) 10 mg Tab Take 1 tablet (10 mg total) by mouth once daily. 90 tablet 0     No current facility-administered medications on file prior to visit.       Review of patient's allergies indicates:   Allergen Reactions    Augmentin [amoxicillin-pot clavulanate] Swelling     Not allergic to amoxicillin just a derivative of augmentin    Lisinopril Other (See Comments)     Fluid around heart    Losartan Other (See Comments)     High potassium    Shellfish containing products Anaphylaxis     Pt.states she is allergic to SEAFOOD since the age of 13    Levaquin [levofloxacin] Other (See Comments)     Very bad joint pain    Clindamycin Palpitations     Chest pain     Objective:   There were no vitals filed for this visit.     Physical Exam     Assessment:     1. Automatic implantable cardioverter-defibrillator in situ      Plan:   Diagnoses and all orders for this visit:    Automatic implantable cardioverter-defibrillator in situ         No follow-ups on file.

## 2022-06-30 NOTE — PROGRESS NOTES
Subjective:       Patient ID: Odette Hart is a 66 y.o. female.    Chief Complaint: Hospital Follow Up, Abdominal Pain, and Medication Refill    HPI  Pt visit today for f/u from hospital s/p colon bladder fistula repair  S/p intraabd sepsis colostomy  malnutrion s/p TPN pt tolerating po diet now but having chronic nausea no vomitting and chronic bad pain she also getting wound care at home she denies sob cp LI she has h/o CHF from dilated cardiomyopathy EF 20% and bilateral leg weakness she was getting better with PT request to continue. She has chronic anxiety worsening with critical med conditions she denies suicidal thought or ideation  Review of Systems    Objective:      Physical Exam  Vitals and nursing note reviewed.   Constitutional:       General: She is not in acute distress.     Appearance: She is well-developed.   HENT:      Head: Normocephalic and atraumatic.      Right Ear: External ear normal.      Left Ear: External ear normal.      Nose: Nose normal.      Mouth/Throat:      Pharynx: No oropharyngeal exudate.   Eyes:      General:         Right eye: No discharge.         Left eye: No discharge.      Conjunctiva/sclera: Conjunctivae normal.      Pupils: Pupils are equal, round, and reactive to light.   Neck:      Thyroid: No thyromegaly.   Cardiovascular:      Rate and Rhythm: Normal rate and regular rhythm.      Heart sounds: Normal heart sounds. No murmur heard.    No friction rub. No gallop.   Pulmonary:      Effort: Pulmonary effort is normal. No respiratory distress.      Breath sounds: Normal breath sounds. No wheezing.   Abdominal:      General: Bowel sounds are normal. There is no distension.      Palpations: Abdomen is soft.      Tenderness: There is no abdominal tenderness.   Musculoskeletal:         General: No tenderness or deformity. Normal range of motion.      Cervical back: Normal range of motion and neck supple.   Lymphadenopathy:      Cervical: No cervical adenopathy.    Skin:     General: Skin is warm and dry.      Findings: No erythema or rash.   Neurological:      Mental Status: She is alert and oriented to person, place, and time.   Psychiatric:         Mood and Affect: Mood normal.         Thought Content: Thought content normal.         Judgment: Judgment normal.         Assessment:       1. Weakness of both lower extremities    2. Anxiety    3. Epigastric pain    4. S/P colostomy    5. Nausea    6. Abdominal gas pain    7. Congestive heart failure with left ventricular diastolic dysfunction, NYHA class 2        Plan:       Weakness of both lower extremities  -     Ambulatory referral/consult to Physical/Occupational Therapy; Future; Expected date: 07/07/2022    Anxiety  -     ALPRAZolam (XANAX) 0.5 MG tablet; Take 1 tablet (0.5 mg total) by mouth nightly as needed for Anxiety.  Dispense: 30 tablet; Refill: 1    Epigastric pain  -     traMADoL (ULTRAM) 50 mg tablet; Take 1 tablet (50 mg total) by mouth every 8 (eight) hours as needed for Pain.  Dispense: 20 tablet; Refill: 0  -     esomeprazole (NEXIUM) 40 MG capsule; Take 1 capsule (40 mg total) by mouth before breakfast.  Dispense: 30 capsule; Refill: 11    S/P colostomy  -     simethicone (MYLICON) 80 MG chewable tablet; Take 1 tablet (80 mg total) by mouth every 8 (eight) hours as needed for Flatulence.  Dispense: 120 tablet; Refill: 5    Nausea  -     ondansetron (ZOFRAN-ODT) 8 MG TbDL; Take 1 tablet (8 mg total) by mouth every 12 (twelve) hours as needed (nausea).  Dispense: 20 tablet; Refill: 2    Abdominal gas pain  -     simethicone (MYLICON) 80 MG chewable tablet; Take 1 tablet (80 mg total) by mouth every 8 (eight) hours as needed for Flatulence.  Dispense: 120 tablet; Refill: 5    Congestive heart failure with left ventricular diastolic dysfunction, NYHA class 2  Comments:  EF 20 % currently compensated        Medication List with Changes/Refills   New Medications    ESOMEPRAZOLE (NEXIUM) 40 MG CAPSULE    Take 1  capsule (40 mg total) by mouth before breakfast.    ONDANSETRON (ZOFRAN-ODT) 8 MG TBDL    Take 1 tablet (8 mg total) by mouth every 12 (twelve) hours as needed (nausea).   Current Medications    ACETAMINOPHEN (TYLENOL) 500 MG TABLET    Take 2 tablets (1,000 mg total) by mouth every 8 (eight) hours as needed for Pain.    ALBUTEROL (PROVENTIL/VENTOLIN HFA) 90 MCG/ACTUATION INHALER    Inhale 2 puffs into the lungs every 6 (six) hours as needed for Wheezing. Rescue    ASPIRIN (ECOTRIN) 81 MG EC TABLET    Take 1 tablet (81 mg total) by mouth once daily.    CARVEDILOL (COREG) 3.125 MG TABLET    Take 1 tablet (3.125 mg total) by mouth 2 (two) times daily with meals.    CLOPIDOGREL (PLAVIX) 75 MG TABLET    Take 1 tablet (75 mg total) by mouth once daily.    COLLAGENASE (SANTYL) OINTMENT    Apply topically once daily.    DOCUSATE SODIUM (COLACE) 100 MG CAPSULE    Take 1 capsule (100 mg total) by mouth 2 (two) times daily.    DOXYCYCLINE (VIBRAMYCIN) 100 MG CAP    Take 1 capsule (100 mg total) by mouth every 12 (twelve) hours. for 7 days    EZETIMIBE (ZETIA) 10 MG TABLET    Take 1 tablet (10 mg total) by mouth every evening.    FLUTICASONE PROPIONATE (FLONASE) 50 MCG/ACTUATION NASAL SPRAY    1 spray (50 mcg total) by Each Nostril route once daily.    FUROSEMIDE (LASIX) 20 MG TABLET    Take 1 tablet (20 mg total) by mouth once daily.    NITROGLYCERIN (NITROSTAT) 0.4 MG SL TABLET    Place 1 tablet (0.4 mg total) under the tongue every 5 (five) minutes as needed for Chest pain.    POLYETHYLENE GLYCOL (GLYCOLAX) 17 GRAM/DOSE POWDER    Take 17 g by mouth once daily.    SPIRONOLACTONE (ALDACTONE) 25 MG TABLET    Take half a tablet (12.5 mg total) by mouth once daily.   Changed and/or Refilled Medications    Modified Medication Previous Medication    ALPRAZOLAM (XANAX) 0.5 MG TABLET ALPRAZolam (XANAX) 0.5 MG tablet       Take 1 tablet (0.5 mg total) by mouth nightly as needed for Anxiety.    Take 1 tablet (0.5 mg total) by mouth  nightly as needed for Anxiety.    SIMETHICONE (MYLICON) 80 MG CHEWABLE TABLET simethicone (MYLICON) 80 MG chewable tablet       Take 1 tablet (80 mg total) by mouth every 8 (eight) hours as needed for Flatulence.    Take 80 mg by mouth every 8 (eight) hours as needed.    TRAMADOL (ULTRAM) 50 MG TABLET traMADoL (ULTRAM) 50 mg tablet       Take 1 tablet (50 mg total) by mouth every 8 (eight) hours as needed for Pain.    Take 1 tablet (50 mg total) by mouth every 8 (eight) hours as needed for Pain.

## 2022-07-01 NOTE — TELEPHONE ENCOUNTER
----- Message from Alma Alvarenga sent at 7/1/2022 11:20 AM CDT -----  Type:  Patient Returning Call    Who Called:pt   Would the patient rather a call back or a response via [facility]? Call   Best Call Back Number: [phone]  Additional Information:     Pt stated she can not make it she isn't feeling well  Pt would a call back with new appt date/ time

## 2022-07-05 NOTE — ASSESSMENT & PLAN NOTE
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  Required RLQ drain for some time.  Recent ct abdomen 6/6 showed post operative changes without a large focal fluid collection.  Daily wound care  - patient wants to go home, trying to arrange home health, previous [employer] company not able to take her back  - patient taught her wound care  - follow up with wound care ostomy clinic on dc and primary  Surgeon as well as pcp.

## 2022-07-05 NOTE — ASSESSMENT & PLAN NOTE
Acute on chronic combines systolic and diastolic HF   Echo  5/24/2022  Summary  · The left ventricle is severely enlarged with eccentric hypertrophy and severely decreased systolic function.  · Severe left atrial enlargement.  · Grade III left ventricular diastolic dysfunction.  · Normal right ventricular size with normal right ventricular systolic function.  · Mild-to-moderate mitral regurgitation.  · Mild tricuspid regurgitation.  · There is mild pulmonary hypertension.  - CHF pathways initiated   - monitor during diuresis     -  20 mg lasix oral daily with 12.5 mg aldactone  - has leg swelling, will give lasix 20 mg iv bid , dc iv vanc  - fluid status better, dc on aldactone and daily lasix, advised to increase lasix to bid with worsening swelling or weight gain.

## 2022-07-05 NOTE — DISCHARGE SUMMARY
"[facility] Medicine  Discharge Summary      Patient Name: Odette Hart  MRN: [id number]  Patient Class: IP- Inpatient  Admission Date: 6/8/2022  Hospital Length of Stay: 17 days  Discharge Date and Time: 6/25/2022  7:00 AM  Attending Physician: No att. providers found   Discharging Provider: Sanjuanita Briones MD  Primary Care Provider: Braxton Barragan MD      HPI:   From H&P by Martha Hameed PA:  "Ms. Odette Hart is a 66 y.o. female, with PMH of HFrEF (EF 20%), CAD s/p 2x HEVER placement in 1/2022 (on Plavix), Ischemic Cardiomyopathy s/p ICD placement, RBBB, HTN, calculus cholecystitis s/p recent cholecystectomy, colovesicular fistula 2/2 complicated diverticulitis s/p cystoscopy with BL ureteral catheters, open sigmoid colon resection, appendectomy, and creation of end colostomy on 5/9/22, anemia, chronic hypoxemic respiratory failure (not on home O2), MDD, Debility, who presented to [facility] ED on 6/8/22 due to shortness of breath. She notes sudden onset a few days ago, with worsening tonight. She states this is worse with exertion. She also notes pain around her colostomy site over the past several days. She was seen in the ED due to this pain on 6/6/22 and was diagnosed with a UTI, and discharged on Macrobid. She states she had two episodes of emesis yesterday. She was evaluated in the ED with labs showing anemia with H&H of 10.9/33.1, ALP of 138, BNP of >4900. CXR showed prominent pulmonary vasculature, with interstitial and patchy alveolar infiltrates, and without pleural effusion. She was treated in the ED with 40 mg IV lasix. She was admitted to inpatient status."      * No surgery found *      Hospital Course:   Patient diuresed with improvement in her shortness of breath and lower extremity swelling.  Able to tolerate her diet.  She was seen by surgery and wound care ordered to evaluate the ostomy and midline incision with dehisced areas.  In addition to dehiscence the " wound care nurse noted multiple pustular lesions scattered on the patient's buttocks and abdomen that were new.  She was started on IV vancomycin on 6/15 with improvement in lesions. Surgery re-evaluated and recommended continued wound care and antibiotics.  As she will need daily wound care SNF was consulted.Patient with accepting facility but she refused to go.Vancomycin was changed to oral doxycycline for completion of antibiotics.Patients original home health agency refused to take her back.Patient was notified that it has been difficult to get home health agency for her but she still wanted to go home.She was taught how to do her abdominal wound care.The patient would have to do wound care at home even with home health as they would not provide daily wound care at home.Also referral sent to follow with wound care/ostomy clinic at [facility] , appointment could not be made as the provider was out of office.She will follow with her primary surgeon  as well for wound check and follow up.Patients insurance was trying to find her home health agency on follow up.Patient was ambulating good while in hospital with walker by herself and therapy discharged her from their services.  Patient left the next morning due to transportation.     Goals of Care Treatment Preferences:  Code Status: Full Code      Consults:   Consults (From admission, onward)        Status Ordering Provider     Inpatient consult to Registered Dietitian/Nutritionist  Once        Provider:  (Not yet assigned)    Completed SANTIAGO LAMBERT     Inpatient consult to Social Work  Once        Provider:  (Not yet assigned)    Completed CHERI MORSE     Inpatient consult to General Surgery  Once        Provider:  Stu Jacobsen MD    Completed CHERI MORSE     Inpatient consult to Social Work/Case Management  Once        Provider:  (Not yet assigned)    Completed AYDEN CALLAWAY     Inpatient consult to Registered Dietitian/Nutritionist   Once        Provider:  (Not yet assigned)    Completed AYDEN CALLAWAY     Inpatient consult to Social Work  Once        Provider:  (Not yet assigned)    Completed AYDEN CALLAWAY          * Acute on chronic congestive heart failure  Acute on chronic combines systolic and diastolic HF   Echo  5/24/2022  Summary  · The left ventricle is severely enlarged with eccentric hypertrophy and severely decreased systolic function.  · Severe left atrial enlargement.  · Grade III left ventricular diastolic dysfunction.  · Normal right ventricular size with normal right ventricular systolic function.  · Mild-to-moderate mitral regurgitation.  · Mild tricuspid regurgitation.  · There is mild pulmonary hypertension.  - CHF pathways initiated   - monitor during diuresis     -  20 mg lasix oral daily with 12.5 mg aldactone  - has leg swelling, will give lasix 20 mg iv bid , dc iv vanc  - fluid status better, dc on aldactone and daily lasix, advised to increase lasix to bid with worsening swelling or weight gain.    Cellulitis of abdominal wall  Patient with new eruption of pustules over her abdomen.  Wound care nurse had noted coccygeal DTI on presentation as well as 2 pustules on her buttocks.  Now with new pustules on her abdomen.  Cause of this is unclear, although appears to be a Staph infection.  Started vancomycin.  Surgery following, unclear how this is related to the recent surgery/ostomy, although there is some wound dehiscence that looks unrelated to the skin infection.  Dr. Cardozo was the CRS on her case and has visited her here, see his note.  Having good improvement on the antibiotics with daily wound care.Dc vanc and started doxycycline.    Superficial dehiscence of operation wound  appr sx recs  Wound care  Had leaking ostomy at home, doing OK here  Had both a colo-vaginal and colo-vesical fistula prior to surgery, repaired.  Required RLQ drain for some time.  Recent ct abdomen 6/6 showed post operative  changes without a large focal fluid collection.  Daily wound care  - patient wants to go home, trying to arrange home health, previous [employer] company not able to take her back  - patient taught her wound care  - follow up with wound care ostomy clinic on dc and primary  Surgeon as well as pcp.      Increased nutritional needs  Continue diet and supplements      CAD (coronary artery disease)  - continue Plavix , asa, zetia  - 1/24/2022: [facility]: Cath: LAD: Proximal stent patent. LCX: Proximal 80%. LCX: Dominant. Moderate disease. LCX: HEVER 2.75 x 18 mm. Distal dissection. HEVER 2.75 x 22 mm.       Anemia  - H&H appear to be at approximate baseline   - monitor       Primary hypertension  - mildly hypotensive with preserved MAP at time of admission   - home meds: carvedilol 3.125 mg BID, lasix 20 mg QD, spironolactone 12.5 mg QD  - monitor     Ischemic cardiomyopathy  - history noted   - s/p ICD placement         Final Active Diagnoses:    Diagnosis Date Noted POA    PRINCIPAL PROBLEM:  Acute on chronic congestive heart failure [I50.9] 03/07/2019 Yes    Cellulitis of abdominal wall [L03.311] 06/15/2022 No    Superficial dehiscence of operation wound [T81.31XA] 06/08/2022 Yes    Increased nutritional needs [R63.8] 05/31/2022 Yes    CAD (coronary artery disease) [I25.10] 05/24/2022 Yes    Debility [R53.81] 05/16/2022 Yes    Anemia [D64.9] 05/07/2022 Yes    Primary hypertension [I10] 01/24/2022 Yes    Ischemic cardiomyopathy [I25.5] 02/04/2019 Yes      Problems Resolved During this Admission:    Diagnosis Date Noted Date Resolved POA    Chronic hypoxemic respiratory failure [J96.11] 05/27/2022 06/09/2022 Yes    Acute cystitis without hematuria [N30.00] 04/26/2022 06/13/2022 Yes       Discharged Condition: stable    Disposition: Home or Self Care    Follow Up:   Follow-up Information     Braxton Barragan MD Follow up in 1 week(s).    Specialties: Internal Medicine, Pediatrics  Contact information:  [address] [place] [address]  [phone]             Rafa Cardozo MD Follow up in 1 week(s).    Specialty: Colon and Rectal Surgery  Contact information:  [address]  [facility] [address]  [phone]             Vince Lopez MD Follow up in 2 week(s).    Specialties: Cardiovascular Disease, Cardiology, Interventional Cardiology  Contact information:  [address]  [address]  [facility] [address] [phone]             [facility] Follow up.    Specialties: Home Health Services, SNF Agency, Physical Therapy, Speech Pathology, Occupational Therapy  Contact information:  [facility]  [phone]                       Patient Instructions:      Ambulatory referral/consult to Wound Clinic   Standing Status: Future   Referral Priority: Urgent Referral Type: Consultation   Referral Reason: Specialty Services Required   Referred to Provider: LC ANTONIO Requested Specialty: Wound Care   Number of Visits Requested: 1     Diet Cardiac   Order Comments: Fluid restriction 1500 cc a day       Significant Diagnostic Studies:   Lab Results   Component Value Date    WBC 3.04 (L) 06/10/2022    HGB 11.6 (L) 06/10/2022    HCT 36.6 (L) 06/10/2022    MCV 90 06/10/2022     06/10/2022       BMP  Lab Results   Component Value Date     (L) 06/24/2022    K 3.6 06/24/2022    CL 93 (L) 06/24/2022    CO2 32 (H) 06/24/2022    BUN 12 06/24/2022    CREATININE 0.6 06/24/2022    CALCIUM 8.7 06/24/2022    ANIONGAP 7 (L) 06/24/2022    ESTGFRAFRICA >60 06/24/2022    EGFRNONAA >60 06/24/2022     X-Ray Chest 1 View  Narrative: EXAMINATION:  XR CHEST 1 VIEW    CLINICAL HISTORY:  Cough, unspecified    TECHNIQUE:  Single frontal view of the chest was performed.    COMPARISON:  Chest radiograph May 23, 2022    FINDINGS:  Single chest view is submitted.  Cardiac pacemaker is again noted.  When accounting for difference in position and technique the appearance of the cardiomediastinal silhouette is stable.  There is  mild diminished depth of inspiration and mild rotation.    Prominence of the pulmonary vasculature is noted in there is bilateral pattern of interstitial and patchy alveolar infiltrate.  There is no evidence for significant pleural effusion and no evidence for pneumothorax.  The osseous structures appear intact with chronic change noted.  Impression: Intrathoracic findings as above.    Electronically signed by: Adrián Valdez  Date:    06/08/2022  Time:    01:10      Pending Diagnostic Studies:     None         Medications:  Reconciled Home Medications:      Medication List      START taking these medications    collagenase ointment  Commonly known as: SANTYL  Apply topically once daily.     docusate sodium 100 MG capsule  Commonly known as: COLACE  Take 1 capsule (100 mg total) by mouth 2 (two) times daily.     polyethylene glycol 17 gram/dose powder  Commonly known as: GLYCOLAX  Take 17 g by mouth once daily.  Replaces: polyethylene glycol 17 gram Pwpk     Tramadol 50 mg  Take  1 tablet every 6 hours as needed for pain.     CONTINUE taking these medications    acetaminophen 500 MG tablet  Commonly known as: TYLENOL  Take 2 tablets (1,000 mg total) by mouth every 8 (eight) hours as needed for Pain.     albuterol 90 mcg/actuation inhaler  Commonly known as: PROVENTIL/VENTOLIN HFA  Inhale 2 puffs into the lungs every 6 (six) hours as needed for Wheezing. Rescue     aspirin 81 MG EC tablet  Commonly known as: ECOTRIN  Take 1 tablet (81 mg total) by mouth once daily.     carvediloL 3.125 MG tablet  Commonly known as: COREG  Take 1 tablet (3.125 mg total) by mouth 2 (two) times daily with meals.     clopidogreL 75 mg tablet  Commonly known as: PLAVIX  Take 1 tablet (75 mg total) by mouth once daily.     ezetimibe 10 mg tablet  Commonly known as: ZETIA  Take 1 tablet (10 mg total) by mouth every evening.     fluticasone propionate 50 mcg/actuation nasal spray  Commonly known as: FLONASE  1 spray (50 mcg total) by Each  Nostril route once daily.     furosemide 20 MG tablet  Commonly known as: LASIX  Take 1 tablet (20 mg total) by mouth once daily.     nitroGLYCERIN 0.4 MG SL tablet  Commonly known as: NITROSTAT  Place 1 tablet (0.4 mg total) under the tongue every 5 (five) minutes as needed for Chest pain.     spironolactone 25 MG tablet  Commonly known as: ALDACTONE  Take half a tablet (12.5 mg total) by mouth once daily.        STOP taking these medications    HYDROcodone-acetaminophen 5-325 mg per tablet  Commonly known as: NORCO     Lactobacillus rhamnosus GG 10 billion cell capsule  Commonly known as: CULTURELLE     metoprolol succinate 25 MG 24 hr tablet  Commonly known as: TOPROL-XL     pantoprazole 40 MG tablet  Commonly known as: PROTONIX     polyethylene glycol 17 gram Pwpk  Commonly known as: GLYCOLAX  Replaced by: polyethylene glycol 17 gram/dose powder     prasugreL 10 mg Tab  Commonly known as: EFFIENT        ASK your doctor about these medications    doxycycline 100 MG Cap  Commonly known as: VIBRAMYCIN  Take 1 capsule (100 mg total) by mouth every 12 (twelve) hours. for 7 days  Ask about: Should I take this medication?            Indwelling Lines/Drains at time of discharge:   Lines/Drains/Airways     Drain  Duration                Colostomy 05/09/22 1100 Descending/sigmoid LLQ 57 days                Time spent on the discharge of patient: 60 minutes         Sanjuanita Briones MD  Department of Hospital Medicine  [facility] ([facility]

## 2022-07-05 NOTE — ASSESSMENT & PLAN NOTE
Patient with new eruption of pustules over her abdomen.  Wound care nurse had noted coccygeal DTI on presentation as well as 2 pustules on her buttocks.  Now with new pustules on her abdomen.  Cause of this is unclear, although appears to be a Staph infection.  Started vancomycin.  Surgery following, unclear how this is related to the recent surgery/ostomy, although there is some wound dehiscence that looks unrelated to the skin infection.  Dr. Cardozo was the CRS on her case and has visited her here, see his note.  Having good improvement on the antibiotics with daily wound care.Dc vanc and started doxycycline.

## 2022-07-07 PROBLEM — E88.09 HYPOALBUMINEMIA: Status: ACTIVE | Noted: 2022-01-01

## 2022-07-07 NOTE — PROGRESS NOTES
"  Subjective:      Patient ID: Odette Hart is a 66 y.o. female.    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  Pt has a colostomy.  Pt has a bedsore and a wound drainage and is being followed by wound care at [facility].    Pt went to SNF at [facility] and was very unhappy there.    Pt has been hospitalized twice since surgery at [facility] with CHF.    Pt drinks protein supplement.    Review of Systems   Cardiovascular: Positive for dyspnea on exertion (chronic, stable), leg swelling (intermittent;  pt takes an extra lasix prn.) and palpitations (mild, intermittent). Negative for chest pain, claudication, irregular heartbeat, near-syncope, orthopnea and syncope.      "I feel tired"    Past Medical History:   Diagnosis Date    Acute coronary syndrome     Acute exacerbation of chronic obstructive pulmonary disease (COPD) 3/23/2022    Allergy     Arthritis     Cardiomyopathy     CHF (congestive heart failure)     Coronary artery disease     Coronary artery disease of native artery of native heart with stable angina pectoris 4/19/2021 1/24/2022: [facility]: Cath: LAD: Proximal stent patent. LCX: Proximal 80%. LCX: Dominant. Moderate disease. LCX: HEVER 2.75 x 18 mm. Distal dissection. HEVER 2.75 x 22 mm.     Diverticulitis     Diverticulosis     Familial hypercholesterolemia 1/22/2022    Former smoker 1/24/2022    Heart attack     Heart disease     History of myocardial infarction 1/24/2022    Hyperlipidemia     Hypertension     Hypertension 1/24/2022    ICD (implantable cardioverter-defibrillator) in place     Non-ST elevation myocardial infarction (NSTEMI) 2/4/2019        Past Surgical History:   Procedure Laterality Date    APPENDECTOMY N/A 5/9/2022    Procedure: APPENDECTOMY;  Surgeon: Rafa Cardozo MD;  Location: [facility] OR [address] [place] [address];  Service: Colon and Rectal;  Laterality: N/A;    ATRIAL CARDIAC PACEMAKER INSERTION   "    CECECTOMY N/A 2022    Procedure: EXCISION, CECUM;  Surgeon: Rafa Cardozo MD;  Location: [facility] OR 2ND FLR;  Service: Colon and Rectal;  Laterality: N/A;     SECTION      CHOLECYSTECTOMY N/A 2022    Procedure: CHOLECYSTECTOMY;  Surgeon: Doug Epstein MD;  Location: [facility] OR 2ND FLR;  Service: General;  Laterality: N/A;    COLONOSCOPY N/A 2022    Procedure: COLONOSCOPY;  Surgeon: Rafa Cardozo MD;  Location: [facility] OR 2ND FLR;  Service: Colon and Rectal;  Laterality: N/A;    COLOSTOMY  2022    Procedure: CREATION, COLOSTOMY;  Surgeon: Rafa Cardozo MD;  Location: [facility] OR 2ND FLR;  Service: Colon and Rectal;;    CORONARY STENT PLACEMENT      LEFT HEART CATHETERIZATION Left 2022    Procedure: CATHETERIZATION, HEART, LEFT;  Surgeon: Yohannes Cordova MD;  Location: [facility] CATH LAB;  Service: Cardiology;  Laterality: Left;       Family History   Problem Relation Age of Onset    Heart disease Mother     Emphysema Father     Heart attack Brother        Social History     Socioeconomic History    Marital status:    Tobacco Use    Smoking status: Former Smoker     Packs/day: 0.50     Start date: 1976     Quit date: 2012     Years since quittin.5    Smokeless tobacco: Never Used   Substance and Sexual Activity    Alcohol use: No    Drug use: No     Social Determinants of Health     Financial Resource Strain: Low Risk     Difficulty of Paying Living Expenses: Not hard at all   Food Insecurity: No Food Insecurity    Worried About Running Out of Food in the Last Year: Never true    Ran Out of Food in the Last Year: Never true   Transportation Needs: No Transportation Needs    Lack of Transportation (Medical): No    Lack of Transportation (Non-Medical): No   Physical Activity: Inactive    Days of Exercise per Week: 0 days    Minutes of Exercise per Session: 0 min   Stress: No Stress Concern Present    Feeling of Stress : Not at all   Social  Connections: Socially Isolated    Frequency of Communication with Friends and Family: More than three times a week    Frequency of Social Gatherings with Friends and Family: Once a week    Attends Adventist Services: Never    Active Member of Clubs or Organizations: No    Attends Club or Organization Meetings: Never    Marital Status:    Housing Stability: Low Risk     Unable to Pay for Housing in the Last Year: No    Number of Places Lived in the Last Year: 1    Unstable Housing in the Last Year: No       Current Outpatient Medications on File Prior to Visit   Medication Sig Dispense Refill    acetaminophen (TYLENOL) 500 MG tablet Take 2 tablets (1,000 mg total) by mouth every 8 (eight) hours as needed for Pain.  0    albuterol (PROVENTIL/VENTOLIN HFA) 90 mcg/actuation inhaler Inhale 2 puffs into the lungs every 6 (six) hours as needed for Wheezing. Rescue 18 g 1    ALPRAZolam (XANAX) 0.5 MG tablet Take 1 tablet (0.5 mg total) by mouth nightly as needed for Anxiety. 30 tablet 1    aspirin (ECOTRIN) 81 MG EC tablet Take 1 tablet (81 mg total) by mouth once daily. 90 tablet 0    carvediloL (COREG) 3.125 MG tablet Take 1 tablet (3.125 mg total) by mouth 2 (two) times daily with meals. 60 tablet 1    clopidogreL (PLAVIX) 75 mg tablet Take 1 tablet (75 mg total) by mouth once daily. 30 tablet 1    collagenase (SANTYL) ointment Apply topically once daily. 90 g 1    docusate sodium (COLACE) 100 MG capsule Take 1 capsule (100 mg total) by mouth 2 (two) times daily. 60 capsule 1    esomeprazole (NEXIUM) 40 MG capsule Take 1 capsule (40 mg total) by mouth before breakfast. 30 capsule 11    ezetimibe (ZETIA) 10 mg tablet Take 1 tablet (10 mg total) by mouth every evening. 90 tablet 3    fluticasone propionate (FLONASE) 50 mcg/actuation nasal spray 1 spray (50 mcg total) by Each Nostril route once daily. 9.9 mL 1    furosemide (LASIX) 20 MG tablet Take 1 tablet (20 mg total) by mouth once daily. 30  "tablet 1    nitroGLYCERIN (NITROSTAT) 0.4 MG SL tablet Place 1 tablet (0.4 mg total) under the tongue every 5 (five) minutes as needed for Chest pain. 25 tablet 0    ondansetron (ZOFRAN-ODT) 8 MG TbDL Take 1 tablet (8 mg total) by mouth every 12 (twelve) hours as needed (nausea). 20 tablet 2    polyethylene glycol (GLYCOLAX) 17 gram/dose powder Take 17 g by mouth once daily. 510 g 1    simethicone (MYLICON) 80 MG chewable tablet Take 1 tablet (80 mg total) by mouth every 8 (eight) hours as needed for Flatulence. 120 tablet 5    spironolactone (ALDACTONE) 25 MG tablet Take half a tablet (12.5 mg total) by mouth once daily. 15 tablet 1    traMADoL (ULTRAM) 50 mg tablet Take 1 tablet (50 mg total) by mouth every 8 (eight) hours as needed for Pain. 20 tablet 0    [DISCONTINUED] metoprolol succinate (TOPROL-XL) 25 MG 24 hr tablet Take 1 tablet (25 mg total) by mouth once daily. 1 tablet 0    [DISCONTINUED] pantoprazole (PROTONIX) 40 MG tablet Take 1 tablet (40 mg total) by mouth once daily. 30 tablet 1    [DISCONTINUED] prasugreL (EFFIENT) 10 mg Tab Take 1 tablet (10 mg total) by mouth once daily. 90 tablet 0     No current facility-administered medications on file prior to visit.       Review of patient's allergies indicates:   Allergen Reactions    Augmentin [amoxicillin-pot clavulanate] Swelling     Not allergic to amoxicillin just a derivative of augmentin    Lisinopril Other (See Comments)     Fluid around heart    Losartan Other (See Comments)     High potassium    Shellfish containing products Anaphylaxis     Pt.states she is allergic to SEAFOOD since the age of 13    Levaquin [levofloxacin] Other (See Comments)     Very bad joint pain    Clindamycin Palpitations     Chest pain     Objective:     Vitals:    07/07/22 1419   BP: (!) 92/51   BP Location: Left arm   Patient Position: Sitting   BP Method: Large (Automatic)   Pulse: 80   SpO2: 99%   Weight: 51.3 kg (113 lb 1.5 oz)   Height: 5' 3" (1.6 m) "        Physical Exam  Vitals reviewed.   Constitutional:       Appearance: She is well-developed.   Eyes:      General: No scleral icterus.  Neck:      Vascular: No carotid bruit or JVD.   Cardiovascular:      Rate and Rhythm: Normal rate and regular rhythm.      Heart sounds: Murmur (III/VI systolic) heard.     No gallop.   Pulmonary:      Breath sounds: Normal breath sounds.   Musculoskeletal:      Right lower leg: No edema.      Left lower leg: No edema.   Skin:     General: Skin is warm and dry.   Neurological:      Mental Status: She is alert and oriented to person, place, and time.   Psychiatric:         Behavior: Behavior normal.         Thought Content: Thought content normal.         Judgment: Judgment normal.              ECG today: NSR, RBBB, anteroseptal infarct age undetermined, no significant change    Admission on 06/08/2022, Discharged on 06/25/2022   Component Date Value Ref Range Status    WBC 06/08/2022 4.50  3.90 - 12.70 K/uL Final    RBC 06/08/2022 3.70 (A) 4.00 - 5.40 M/uL Final    Hemoglobin 06/08/2022 10.9 (A) 12.0 - 16.0 g/dL Final    Hematocrit 06/08/2022 33.1 (A) 37.0 - 48.5 % Final    MCV 06/08/2022 90  82 - 98 fL Final    MCH 06/08/2022 29.5  27.0 - 31.0 pg Final    MCHC 06/08/2022 32.9  32.0 - 36.0 g/dL Final    RDW 06/08/2022 20.1 (A) 11.5 - 14.5 % Final    Platelets 06/08/2022 212  150 - 450 K/uL Final    MPV 06/08/2022 9.8  9.2 - 12.9 fL Final    Immature Granulocytes 06/08/2022 0.4  0.0 - 0.5 % Final    Gran # (ANC) 06/08/2022 2.8  1.8 - 7.7 K/uL Final    Immature Grans (Abs) 06/08/2022 0.02  0.00 - 0.04 K/uL Final    Lymph # 06/08/2022 1.3  1.0 - 4.8 K/uL Final    Mono # 06/08/2022 0.4  0.3 - 1.0 K/uL Final    Eos # 06/08/2022 0.0  0.0 - 0.5 K/uL Final    Baso # 06/08/2022 0.03  0.00 - 0.20 K/uL Final    nRBC 06/08/2022 0  0 /100 WBC Final    Gran % 06/08/2022 61.3  38.0 - 73.0 % Final    Lymph % 06/08/2022 29.6  18.0 - 48.0 % Final    Mono % 06/08/2022 8.0   4.0 - 15.0 % Final    Eosinophil % 06/08/2022 0.0  0.0 - 8.0 % Final    Basophil % 06/08/2022 0.7  0.0 - 1.9 % Final    Differential Method 06/08/2022 Automated   Final    Sodium 06/08/2022 132 (A) 136 - 145 mmol/L Final    Potassium 06/08/2022 3.8  3.5 - 5.1 mmol/L Final    Chloride 06/08/2022 96  95 - 110 mmol/L Final    CO2 06/08/2022 24  23 - 29 mmol/L Final    Glucose 06/08/2022 125 (A) 70 - 110 mg/dL Final    BUN 06/08/2022 18  8 - 23 mg/dL Final    Creatinine 06/08/2022 0.8  0.5 - 1.4 mg/dL Final    Calcium 06/08/2022 8.9  8.7 - 10.5 mg/dL Final    Total Protein 06/08/2022 6.7  6.0 - 8.4 g/dL Final    Albumin 06/08/2022 2.1 (A) 3.5 - 5.2 g/dL Final    Total Bilirubin 06/08/2022 0.8  0.1 - 1.0 mg/dL Final    Alkaline Phosphatase 06/08/2022 138 (A) 55 - 135 U/L Final    AST 06/08/2022 27  10 - 40 U/L Final    ALT 06/08/2022 15  10 - 44 U/L Final    Anion Gap 06/08/2022 12  8 - 16 mmol/L Final    eGFR if African American 06/08/2022 >60  >60 mL/min/1.73 m^2 Final    eGFR if non African American 06/08/2022 >60  >60 mL/min/1.73 m^2 Final    BNP 06/08/2022 >4,900 (A) 0 - 99 pg/mL Final    POC Rapid COVID 06/08/2022 Negative  Negative Final     Acceptable 06/08/2022 Yes   Final    Magnesium 06/08/2022 1.5 (A) 1.6 - 2.6 mg/dL Final    Hemoglobin A1C 06/08/2022 4.9  4.0 - 5.6 % Final    Estimated Avg Glucose 06/08/2022 94  68 - 131 mg/dL Final    Procalcitonin 06/08/2022 1.60 (A) <0.25 ng/mL Final    Sodium 06/08/2022 136  136 - 145 mmol/L Final    Potassium 06/08/2022 3.0 (A) 3.5 - 5.1 mmol/L Final    Chloride 06/08/2022 94 (A) 95 - 110 mmol/L Final    CO2 06/08/2022 29  23 - 29 mmol/L Final    Glucose 06/08/2022 118 (A) 70 - 110 mg/dL Final    BUN 06/08/2022 18  8 - 23 mg/dL Final    Creatinine 06/08/2022 0.7  0.5 - 1.4 mg/dL Final    Calcium 06/08/2022 8.5 (A) 8.7 - 10.5 mg/dL Final    Anion Gap 06/08/2022 13  8 - 16 mmol/L Final    eGFR if   06/08/2022 >60  >60 mL/min/1.73 m^2 Final    eGFR if non African American 06/08/2022 >60  >60 mL/min/1.73 m^2 Final    Magnesium 06/08/2022 2.0  1.6 - 2.6 mg/dL Final    Phosphorus 06/08/2022 3.4  2.7 - 4.5 mg/dL Final    Sodium 06/09/2022 135 (A) 136 - 145 mmol/L Final    Potassium 06/09/2022 4.2  3.5 - 5.1 mmol/L Final    Chloride 06/09/2022 94 (A) 95 - 110 mmol/L Final    CO2 06/09/2022 31 (A) 23 - 29 mmol/L Final    Glucose 06/09/2022 108  70 - 110 mg/dL Final    BUN 06/09/2022 18  8 - 23 mg/dL Final    Creatinine 06/09/2022 0.7  0.5 - 1.4 mg/dL Final    Calcium 06/09/2022 8.6 (A) 8.7 - 10.5 mg/dL Final    Anion Gap 06/09/2022 10  8 - 16 mmol/L Final    eGFR if African American 06/09/2022 >60  >60 mL/min/1.73 m^2 Final    eGFR if non African American 06/09/2022 >60  >60 mL/min/1.73 m^2 Final    Magnesium 06/09/2022 2.1  1.6 - 2.6 mg/dL Final    Phosphorus 06/09/2022 2.8  2.7 - 4.5 mg/dL Final    WBC 06/09/2022 2.89 (A) 3.90 - 12.70 K/uL Final    RBC 06/09/2022 3.89 (A) 4.00 - 5.40 M/uL Final    Hemoglobin 06/09/2022 11.1 (A) 12.0 - 16.0 g/dL Final    Hematocrit 06/09/2022 34.6 (A) 37.0 - 48.5 % Final    MCV 06/09/2022 89  82 - 98 fL Final    MCH 06/09/2022 28.5  27.0 - 31.0 pg Final    MCHC 06/09/2022 32.1  32.0 - 36.0 g/dL Final    RDW 06/09/2022 19.9 (A) 11.5 - 14.5 % Final    Platelets 06/09/2022 188  150 - 450 K/uL Final    MPV 06/09/2022 10.4  9.2 - 12.9 fL Final    Immature Granulocytes 06/09/2022 0.3  0.0 - 0.5 % Final    Gran # (ANC) 06/09/2022 1.1 (A) 1.8 - 7.7 K/uL Final    Immature Grans (Abs) 06/09/2022 0.01  0.00 - 0.04 K/uL Final    Lymph # 06/09/2022 1.4  1.0 - 4.8 K/uL Final    Mono # 06/09/2022 0.3  0.3 - 1.0 K/uL Final    Eos # 06/09/2022 0.1  0.0 - 0.5 K/uL Final    Baso # 06/09/2022 0.03  0.00 - 0.20 K/uL Final    nRBC 06/09/2022 0  0 /100 WBC Final    Gran % 06/09/2022 37.8 (A) 38.0 - 73.0 % Final    Lymph % 06/09/2022 48.8 (A) 18.0 - 48.0 % Final    Mono  % 06/09/2022 10.4  4.0 - 15.0 % Final    Eosinophil % 06/09/2022 1.7  0.0 - 8.0 % Final    Basophil % 06/09/2022 1.0  0.0 - 1.9 % Final    Differential Method 06/09/2022 Automated   Final    Sodium 06/10/2022 139  136 - 145 mmol/L Final    Potassium 06/10/2022 3.5  3.5 - 5.1 mmol/L Final    Chloride 06/10/2022 95  95 - 110 mmol/L Final    CO2 06/10/2022 34 (A) 23 - 29 mmol/L Final    Glucose 06/10/2022 83  70 - 110 mg/dL Final    BUN 06/10/2022 15  8 - 23 mg/dL Final    Creatinine 06/10/2022 0.6  0.5 - 1.4 mg/dL Final    Calcium 06/10/2022 8.6 (A) 8.7 - 10.5 mg/dL Final    Anion Gap 06/10/2022 10  8 - 16 mmol/L Final    eGFR if African American 06/10/2022 >60  >60 mL/min/1.73 m^2 Final    eGFR if non African American 06/10/2022 >60  >60 mL/min/1.73 m^2 Final    WBC 06/10/2022 3.04 (A) 3.90 - 12.70 K/uL Final    RBC 06/10/2022 4.07  4.00 - 5.40 M/uL Final    Hemoglobin 06/10/2022 11.6 (A) 12.0 - 16.0 g/dL Final    Hematocrit 06/10/2022 36.6 (A) 37.0 - 48.5 % Final    MCV 06/10/2022 90  82 - 98 fL Final    MCH 06/10/2022 28.5  27.0 - 31.0 pg Final    MCHC 06/10/2022 31.7 (A) 32.0 - 36.0 g/dL Final    RDW 06/10/2022 19.7 (A) 11.5 - 14.5 % Final    Platelets 06/10/2022 206  150 - 450 K/uL Final    MPV 06/10/2022 10.7  9.2 - 12.9 fL Final    Immature Granulocytes 06/10/2022 CANCELED  0.0 - 0.5 % Final    Immature Grans (Abs) 06/10/2022 CANCELED  0.00 - 0.04 K/uL Final    Lymph # 06/10/2022 CANCELED  1.0 - 4.8 K/uL Final    Mono # 06/10/2022 CANCELED  0.3 - 1.0 K/uL Final    Eos # 06/10/2022 CANCELED  0.0 - 0.5 K/uL Final    Baso # 06/10/2022 CANCELED  0.00 - 0.20 K/uL Final    nRBC 06/10/2022 0  0 /100 WBC Final    Gran % 06/10/2022 34.0 (A) 38.0 - 73.0 % Final    Lymph % 06/10/2022 55.0 (A) 18.0 - 48.0 % Final    Mono % 06/10/2022 10.0  4.0 - 15.0 % Final    Eosinophil % 06/10/2022 1.0  0.0 - 8.0 % Final    Basophil % 06/10/2022 0.0  0.0 - 1.9 % Final    Platelet Estimate 06/10/2022  Appears normal   Final    Aniso 06/10/2022 Slight   Final    Poik 06/10/2022 Slight   Final    Poly 06/10/2022 Occasional   Final    Hypo 06/10/2022 Occasional   Final    Ovalocytes 06/10/2022 Occasional   Final    Differential Method 06/10/2022 Manual   Corrected    Phosphorus 06/10/2022 2.6 (A) 2.7 - 4.5 mg/dL Final    Magnesium 06/10/2022 1.9  1.6 - 2.6 mg/dL Final    Sodium 06/11/2022 138  136 - 145 mmol/L Final    Potassium 06/11/2022 4.1  3.5 - 5.1 mmol/L Final    Chloride 06/11/2022 95  95 - 110 mmol/L Final    CO2 06/11/2022 35 (A) 23 - 29 mmol/L Final    Glucose 06/11/2022 116 (A) 70 - 110 mg/dL Final    BUN 06/11/2022 13  8 - 23 mg/dL Final    Creatinine 06/11/2022 0.7  0.5 - 1.4 mg/dL Final    Calcium 06/11/2022 8.4 (A) 8.7 - 10.5 mg/dL Final    Anion Gap 06/11/2022 8  8 - 16 mmol/L Final    eGFR if African American 06/11/2022 >60  >60 mL/min/1.73 m^2 Final    eGFR if non African American 06/11/2022 >60  >60 mL/min/1.73 m^2 Final    Sodium 06/12/2022 137  136 - 145 mmol/L Final    Potassium 06/12/2022 4.3  3.5 - 5.1 mmol/L Final    Chloride 06/12/2022 96  95 - 110 mmol/L Final    CO2 06/12/2022 32 (A) 23 - 29 mmol/L Final    Glucose 06/12/2022 109  70 - 110 mg/dL Final    BUN 06/12/2022 15  8 - 23 mg/dL Final    Creatinine 06/12/2022 0.7  0.5 - 1.4 mg/dL Final    Calcium 06/12/2022 8.8  8.7 - 10.5 mg/dL Final    Anion Gap 06/12/2022 9  8 - 16 mmol/L Final    eGFR if African American 06/12/2022 >60  >60 mL/min/1.73 m^2 Final    eGFR if non African American 06/12/2022 >60  >60 mL/min/1.73 m^2 Final    Vancomycin-Trough 06/17/2022 2.8 (A) 10.0 - 22.0 ug/mL Final    Vancomycin-Trough 06/19/2022 24.2 (A) 10.0 - 22.0 ug/mL Final    Vancomycin-Trough 06/21/2022 19.4  10.0 - 22.0 ug/mL Final    Vancomycin-Trough 06/23/2022 17.2  10.0 - 22.0 ug/mL Final    Creatinine 06/23/2022 0.7  0.5 - 1.4 mg/dL Final    eGFR if African American 06/23/2022 >60  >60 mL/min/1.73 m^2 Final     eGFR if non African American 06/23/2022 >60  >60 mL/min/1.73 m^2 Final    Sodium 06/24/2022 132 (A) 136 - 145 mmol/L Final    Potassium 06/24/2022 3.6  3.5 - 5.1 mmol/L Final    Chloride 06/24/2022 93 (A) 95 - 110 mmol/L Final    CO2 06/24/2022 32 (A) 23 - 29 mmol/L Final    Glucose 06/24/2022 91  70 - 110 mg/dL Final    BUN 06/24/2022 12  8 - 23 mg/dL Final    Creatinine 06/24/2022 0.6  0.5 - 1.4 mg/dL Final    Calcium 06/24/2022 8.7  8.7 - 10.5 mg/dL Final    Anion Gap 06/24/2022 7 (A) 8 - 16 mmol/L Final    eGFR if African American 06/24/2022 >60  >60 mL/min/1.73 m^2 Final    eGFR if non African American 06/24/2022 >60  >60 mL/min/1.73 m^2 Final    Magnesium 06/24/2022 1.7  1.6 - 2.6 mg/dL Final   Admission on 06/06/2022, Discharged on 06/06/2022   Component Date Value Ref Range Status    WBC 06/06/2022 3.50 (A) 3.90 - 12.70 K/uL Final    RBC 06/06/2022 3.93 (A) 4.00 - 5.40 M/uL Final    Hemoglobin 06/06/2022 11.3 (A) 12.0 - 16.0 g/dL Final    Hematocrit 06/06/2022 37.2  37.0 - 48.5 % Final    MCV 06/06/2022 95  82 - 98 fL Final    MCH 06/06/2022 28.8  27.0 - 31.0 pg Final    MCHC 06/06/2022 30.4 (A) 32.0 - 36.0 g/dL Final    RDW 06/06/2022 20.5 (A) 11.5 - 14.5 % Final    Platelets 06/06/2022 248  150 - 450 K/uL Final    MPV 06/06/2022 10.3  9.2 - 12.9 fL Final    Immature Granulocytes 06/06/2022 CANCELED  0.0 - 0.5 % Final    Immature Grans (Abs) 06/06/2022 CANCELED  0.00 - 0.04 K/uL Final    Lymph # 06/06/2022 CANCELED  1.0 - 4.8 K/uL Final    Mono # 06/06/2022 CANCELED  0.3 - 1.0 K/uL Final    Eos # 06/06/2022 CANCELED  0.0 - 0.5 K/uL Final    Baso # 06/06/2022 CANCELED  0.00 - 0.20 K/uL Final    nRBC 06/06/2022 0  0 /100 WBC Final    Gran % 06/06/2022 27.0 (A) 38.0 - 73.0 % Final    Lymph % 06/06/2022 56.0 (A) 18.0 - 48.0 % Final    Mono % 06/06/2022 11.0  4.0 - 15.0 % Final    Eosinophil % 06/06/2022 1.0  0.0 - 8.0 % Final    Basophil % 06/06/2022 0.0  0.0 - 1.9 % Final     Bands 06/06/2022 5.0  % Final    Platelet Estimate 06/06/2022 Appears normal   Final    Aniso 06/06/2022 Slight   Final    Poik 06/06/2022 Slight   Final    Poly 06/06/2022 Occasional   Final    Ovalocytes 06/06/2022 Occasional   Final    Differential Method 06/06/2022 Manual   Corrected    Sodium 06/06/2022 137  136 - 145 mmol/L Final    Potassium 06/06/2022 3.6  3.5 - 5.1 mmol/L Final    Chloride 06/06/2022 98  95 - 110 mmol/L Final    CO2 06/06/2022 27  23 - 29 mmol/L Final    Glucose 06/06/2022 133 (A) 70 - 110 mg/dL Final    BUN 06/06/2022 20  8 - 23 mg/dL Final    Creatinine 06/06/2022 0.7  0.5 - 1.4 mg/dL Final    Calcium 06/06/2022 8.6 (A) 8.7 - 10.5 mg/dL Final    Total Protein 06/06/2022 7.1  6.0 - 8.4 g/dL Final    Albumin 06/06/2022 2.3 (A) 3.5 - 5.2 g/dL Final    Total Bilirubin 06/06/2022 0.8  0.1 - 1.0 mg/dL Final    Alkaline Phosphatase 06/06/2022 104  55 - 135 U/L Final    AST 06/06/2022 15  10 - 40 U/L Final    ALT 06/06/2022 11  10 - 44 U/L Final    Anion Gap 06/06/2022 12  8 - 16 mmol/L Final    eGFR if African American 06/06/2022 >60.0  >60 mL/min/1.73 m^2 Final    eGFR if non African American 06/06/2022 >60.0  >60 mL/min/1.73 m^2 Final    Lipase 06/06/2022 40  4 - 60 U/L Final    Specimen UA 06/06/2022 Urine, Clean Catch   Final    Color, UA 06/06/2022 Yellow  Yellow, Straw, Tammy Final    Appearance, UA 06/06/2022 Clear  Clear Final    pH, UA 06/06/2022 6.0  5.0 - 8.0 Final    Specific Bieber, UA 06/06/2022 1.020  1.005 - 1.030 Final    Protein, UA 06/06/2022 Trace (A) Negative Final    Glucose, UA 06/06/2022 Negative  Negative Final    Ketones, UA 06/06/2022 Trace (A) Negative Final    Bilirubin (UA) 06/06/2022 Negative  Negative Final    Occult Blood UA 06/06/2022 Negative  Negative Final    Nitrite, UA 06/06/2022 Negative  Negative Final    Urobilinogen, UA 06/06/2022 1.0  Negative EU/dL Final    Leukocytes, UA 06/06/2022 1+ (A) Negative Final    RBC,  UA 06/06/2022 0  0 - 4 /hpf Final    WBC, UA 06/06/2022 14 (A) 0 - 5 /hpf Final    WBC Clumps, UA 06/06/2022 Occasional (A) None-Rare Final    Bacteria 06/06/2022 Rare  None-Occ /hpf Final    Ca Oxalate Carol, UA 06/06/2022 Few  None-Moderate Final    Microscopic Comment 06/06/2022 SEE COMMENT   Final    Urine Culture, Routine 06/06/2022 Multiple organisms isolated. None in predominance.  Repeat if   Final    Urine Culture, Routine 06/06/2022 clinically necessary.   Final   No results displayed because visit has over 200 results.      No results displayed because visit has over 200 results.      No results displayed because visit has over 200 results.      No results displayed because visit has over 200 results.      Office Visit on 04/20/2022   Component Date Value Ref Range Status    Urine Culture, Routine 04/20/2022  (A)  Final                    Value:ENTEROBACTER CLOACAE  >100,000 cfu/ml      Urine Culture, Routine 04/20/2022  (A)  Final                    Value:PSEUDOMONAS AERUGINOSA  10,000 - 49,999 cfu/ml     Admission on 04/18/2022, Discharged on 04/18/2022   Component Date Value Ref Range Status    WBC 04/18/2022 2.90 (A) 3.90 - 12.70 K/uL Final    RBC 04/18/2022 3.91 (A) 4.00 - 5.40 M/uL Final    Hemoglobin 04/18/2022 10.9 (A) 12.0 - 16.0 g/dL Final    Hematocrit 04/18/2022 32.7 (A) 37.0 - 48.5 % Final    MCV 04/18/2022 84  82 - 98 fL Final    MCH 04/18/2022 27.9  27.0 - 31.0 pg Final    MCHC 04/18/2022 33.3  32.0 - 36.0 g/dL Final    RDW 04/18/2022 20.7 (A) 11.5 - 14.5 % Final    Platelets 04/18/2022 266  150 - 450 K/uL Final    MPV 04/18/2022 9.9  9.2 - 12.9 fL Final    Immature Granulocytes 04/18/2022 CANCELED  0.0 - 0.5 % Final    Immature Grans (Abs) 04/18/2022 CANCELED  0.00 - 0.04 K/uL Final    Lymph # 04/18/2022 CANCELED  1.0 - 4.8 K/uL Final    Mono # 04/18/2022 CANCELED  0.3 - 1.0 K/uL Final    Eos # 04/18/2022 CANCELED  0.0 - 0.5 K/uL Final    Baso # 04/18/2022 CANCELED   0.00 - 0.20 K/uL Final    nRBC 04/18/2022 0  0 /100 WBC Final    Gran % 04/18/2022 4.0 (A) 38.0 - 73.0 % Final    Lymph % 04/18/2022 57.0 (A) 18.0 - 48.0 % Final    Mono % 04/18/2022 39.0 (A) 4.0 - 15.0 % Final    Eosinophil % 04/18/2022 0.0  0.0 - 8.0 % Final    Basophil % 04/18/2022 0.0  0.0 - 1.9 % Final    Platelet Estimate 04/18/2022 Appears normal   Final    Aniso 04/18/2022 Slight   Final    Differential Method 04/18/2022 Manual   Final    Sodium 04/18/2022 132 (A) 136 - 145 mmol/L Final    Potassium 04/18/2022 4.1  3.5 - 5.1 mmol/L Final    Chloride 04/18/2022 98  95 - 110 mmol/L Final    CO2 04/18/2022 24  23 - 29 mmol/L Final    Glucose 04/18/2022 111 (A) 70 - 110 mg/dL Final    BUN 04/18/2022 10  8 - 23 mg/dL Final    Creatinine 04/18/2022 0.7  0.5 - 1.4 mg/dL Final    Calcium 04/18/2022 8.6 (A) 8.7 - 10.5 mg/dL Final    Total Protein 04/18/2022 6.7  6.0 - 8.4 g/dL Final    Albumin 04/18/2022 2.3 (A) 3.5 - 5.2 g/dL Final    Total Bilirubin 04/18/2022 0.7  0.1 - 1.0 mg/dL Final    Alkaline Phosphatase 04/18/2022 75  55 - 135 U/L Final    AST 04/18/2022 20  10 - 40 U/L Final    ALT 04/18/2022 13  10 - 44 U/L Final    Anion Gap 04/18/2022 10  8 - 16 mmol/L Final    eGFR if African American 04/18/2022 >60  >60 mL/min/1.73 m^2 Final    eGFR if non African American 04/18/2022 >60  >60 mL/min/1.73 m^2 Final   Lab Visit on 03/15/2022   Component Date Value Ref Range Status    HIV 1/2 Ag/Ab 03/15/2022 Negative  Negative Final    CPK 03/15/2022 43  20 - 180 U/L Final    Magnesium 03/15/2022 2.0  1.6 - 2.6 mg/dL Final    BNP 03/15/2022 425 (A) 0 - 99 pg/mL Final    Cholesterol 03/15/2022 150  120 - 199 mg/dL Final    Triglycerides 03/15/2022 137  30 - 150 mg/dL Final    HDL 03/15/2022 29 (A) 40 - 75 mg/dL Final    LDL Cholesterol 03/15/2022 93.6  63.0 - 159.0 mg/dL Final    HDL/Cholesterol Ratio 03/15/2022 19.3 (A) 20.0 - 50.0 % Final    Total Cholesterol/HDL Ratio 03/15/2022 5.2  (A) 2.0 - 5.0 Final    Non-HDL Cholesterol 03/15/2022 121  mg/dL Final    TSH 03/15/2022 1.553  0.400 - 4.000 uIU/mL Final    WBC 03/15/2022 4.64  3.90 - 12.70 K/uL Final    RBC 03/15/2022 4.76  4.00 - 5.40 M/uL Final    Hemoglobin 03/15/2022 12.7  12.0 - 16.0 g/dL Final    Hematocrit 03/15/2022 40.8  37.0 - 48.5 % Final    MCV 03/15/2022 86  82 - 98 fL Final    MCH 03/15/2022 26.7 (A) 27.0 - 31.0 pg Final    MCHC 03/15/2022 31.1 (A) 32.0 - 36.0 g/dL Final    RDW 03/15/2022 21.2 (A) 11.5 - 14.5 % Final    Platelets 03/15/2022 327  150 - 450 K/uL Final    MPV 03/15/2022 10.0  9.2 - 12.9 fL Final    Immature Granulocytes 03/15/2022 0.2  0.0 - 0.5 % Final    Gran # (ANC) 03/15/2022 1.1 (A) 1.8 - 7.7 K/uL Final    Immature Grans (Abs) 03/15/2022 0.01  0.00 - 0.04 K/uL Final    Lymph # 03/15/2022 2.9  1.0 - 4.8 K/uL Final    Mono # 03/15/2022 0.6  0.3 - 1.0 K/uL Final    Eos # 03/15/2022 0.0  0.0 - 0.5 K/uL Final    Baso # 03/15/2022 0.05  0.00 - 0.20 K/uL Final    nRBC 03/15/2022 0  0 /100 WBC Final    Gran % 03/15/2022 23.5 (A) 38.0 - 73.0 % Final    Lymph % 03/15/2022 62.1 (A) 18.0 - 48.0 % Final    Mono % 03/15/2022 12.9  4.0 - 15.0 % Final    Eosinophil % 03/15/2022 0.2  0.0 - 8.0 % Final    Basophil % 03/15/2022 1.1  0.0 - 1.9 % Final    Differential Method 03/15/2022 Automated   Final    Sodium 03/15/2022 136  136 - 145 mmol/L Final    Potassium 03/15/2022 4.8  3.5 - 5.1 mmol/L Final    Chloride 03/15/2022 99  95 - 110 mmol/L Final    CO2 03/15/2022 27  23 - 29 mmol/L Final    Glucose 03/15/2022 116 (A) 70 - 110 mg/dL Final    BUN 03/15/2022 9  8 - 23 mg/dL Final    Creatinine 03/15/2022 0.8  0.5 - 1.4 mg/dL Final    Calcium 03/15/2022 9.4  8.7 - 10.5 mg/dL Final    Total Protein 03/15/2022 8.7 (A) 6.0 - 8.4 g/dL Final    Albumin 03/15/2022 3.0 (A) 3.5 - 5.2 g/dL Final    Total Bilirubin 03/15/2022 0.6  0.1 - 1.0 mg/dL Final    Alkaline Phosphatase 03/15/2022 77  55 - 135 U/L  "Final    AST 03/15/2022 54 (A) 10 - 40 U/L Final    ALT 03/15/2022 32  10 - 44 U/L Final    Anion Gap 03/15/2022 10  8 - 16 mmol/L Final    eGFR if African American 03/15/2022 >60.0  >60 mL/min/1.73 m^2 Final    eGFR if non African American 03/15/2022 >60.0  >60 mL/min/1.73 m^2 Final    Procalcitonin 03/15/2022 0.05  <0.25 ng/mL Final    BNP 03/15/2022 425 (A) 0 - 99 pg/mL Final    Sed Rate 03/15/2022 104 (A) 0 - 20 mm/Hr Final    Blood Culture, Routine 03/15/2022 No growth after 5 days.   Final   Office Visit on 03/15/2022   Component Date Value Ref Range Status    Urine Culture, Routine 03/15/2022 Multiple organisms isolated. None in predominance.  Repeat if   Final    Urine Culture, Routine 03/15/2022 clinically necessary.   Final   There may be more visits with results that are not included.   (  Accession #: [id number]    Transthoracic echo (TTE) complete  Order# [id number]  Reading physician: Vince Lopez MD Ordering physician: Michael Long NP Study date: 5/24/22       Reason for Exam  Priority: Routine  Dx: Acute on chronic systolic congestive heart failure [I50.23 (ICD-10-CM)]     Result Image Hyperlink     Show images for Echo    Summary    · The left ventricle is severely enlarged with eccentric hypertrophy and severely decreased systolic function.  · Severe left atrial enlargement.  · Grade III left ventricular diastolic dysfunction.  · Normal right ventricular size with normal right ventricular systolic function.  · Mild-to-moderate mitral regurgitation.  · Mild tricuspid regurgitation.  · There is mild pulmonary hypertension.         Vitals    Height Weight BMI (Calculated) BSA (Calculated - sq m) BP Pulse   5' 3" (1.6 m) 55.8 kg (123 lb) 21.8 1.57 sq meters 112/55 69     Study Details    A complete echo was performed using complete 2D, color flow Doppler and spectral Doppler. During the study, the apical, parasternal and subcostal views were captured.  Overall the study quality was " adequate. This was a portable study performed at the patient's bedside.     Echocardiography Findings      Left Ventricle    The left ventricle is severely enlarged with severely decreased systolic function. The estimated ejection fraction is 20%. There is grade III diastolic dysfunction consistent with restrictive physiology. Left atrial pressure is elevated.     Right Ventricle    Normal cavity size, wall thickness and systolic function. Wall motion normal. There is a lead/wire present in the ventricle.     Left Atrium    There is severe left atrial enlargement. Left atrial volume index is 80.9 mL/m2.     Right Atrium    The right atrium is normal in size.     Aortic Valve    The aortic valve appears structurally normal. There is normal leaflet mobility. No regurgitation. There is no stenosis.     Mitral Valve    The mitral valve appears structurally normal. There is normal leaflet mobility.  There is mild to moderate mitral valve regurgitation. There is no stenosis. The estimated mitral valve area by pressure half time is 5.57 cm2.     Tricuspid Valve    The tricuspid valve appears structurally normal. There is normal leaflet mobility. There is mild regurgitation. There is no tricuspid valve stenosis. There is mild pulmonary hypertension. The estimated PA systolic pressure is greater than 37 mmHg.     Pulmonic Valve    Pulmonic valve is structurally normal. There is normal leaflet mobility. No regurgitation. No stenosis.     IVC/SVC    Intermediate central venous pressure (8 mm Hg).     Ascending Aorta    The aortic root and ascending aorta are normal in size.     Pericardium and Other Findings    Pericardium is normal. There is no pericardial effusion.         Status: Final result       [id number] Results Release    [employer] Status: Active  Results Release       PACS Images for [employer] Viewer     Show images for X-Ray Chest 1 View    X-Ray Chest 1 View  Order: [id number]  · Status: Final result     · Visible  to patient: Yes (not seen)      · Next appt: 07/08/2022 at 10:00 AM in Outpatient Rehab ([facility]ALIE [facility]LVARE[facility] DPT)      · 0 Result Notes      Details    Reading Physician Reading Date Result Priority   Adrián Valdez MD  [phone] 6/8/2022 STAT     Narrative & Impression  EXAMINATION:  XR CHEST 1 VIEW     CLINICAL HISTORY:  Cough, unspecified     TECHNIQUE:  Single frontal view of the chest was performed.     COMPARISON:  Chest radiograph May 23, 2022     FINDINGS:  Single chest view is submitted.  Cardiac pacemaker is again noted.  When accounting for difference in position and technique the appearance of the cardiomediastinal silhouette is stable.  There is mild diminished depth of inspiration and mild rotation.     Prominence of the pulmonary vasculature is noted in there is bilateral pattern of interstitial and patchy alveolar infiltrate.  There is no evidence for significant pleural effusion and no evidence for pneumothorax.  The osseous structures appear intact with chronic change noted.     Impression:     Intrathoracic findings as above.        Electronically signed by: Adrián Valdez  Date:                                            06/08/2022  Time:                                           01:10               Exam Ended: 06/08/22 01:05 Last Resulted: 06/08/22 01:10              Patient Information    Name MRN Description   Odette Hart [id number] 66 y.o. female       Physicians    Panel Physicians Referring Physician Case Authorizing Physician   Yohannes Cordova MD (Primary)       Indications    NSTEMI (non-ST elevated myocardial infarction) [I21.4 (ICD-10-CM)]     Summary       · The left ventricular end diastolic pressure was elevated.  · The pre-procedure left ventricular end diastolic pressure was 20.  · The Mid LAD lesion was 70% stenosed.  · The Prox Cx lesion was 80% stenosed with 0% stenosis post-intervention.  · The Mid Cx lesion was 90% stenosed with 0% stenosis post-intervention.  · The  Prox RCA lesion was 50% stenosed.  · The RPDA lesion was 70% stenosed.  · The RPAV lesion was 60% stenosed.  · A STENT RESOLUTE AMBER [id number] stent was successfully placed at 16 PJ for 10 sec.  · A STENT RESOLUTE AMBER [id number] stent was successfully placed at 12 PJ for 15 sec.  · The estimated blood loss was <50 mL.  · There was three vessel coronary artery disease.  · The PCI was successful.     The procedure log was documented by Documenter: RT Waqas and verified by Yohannes Cordova MD.     Date: 1/24/2022  Time: 5:19 PM      Assessment:     1. Coronary artery disease of native artery of native heart with stable angina pectoris    2. Ischemic cardiomyopathy    3. History of myocardial infarction    4. History of coronary artery stent placement    5. Automatic implantable cardioverter-defibrillator in situ    6. Prolonged Q-T interval on ECG    7. RBBB with left anterior fascicular block    8. Hypoalbuminemia    9. Hyponatremia      Plan:   Odette was seen today for follow-up.    Diagnoses and all orders for this visit:    Coronary artery disease of native artery of native heart with stable angina pectoris  -     IN OFFICE EKG 12-LEAD (to Muse)  -     CBC Auto Differential; Future  -     Comprehensive Metabolic Panel; Future  -     TSH; Future    Ischemic cardiomyopathy  -     IN OFFICE EKG 12-LEAD (to Muse)  -     CBC Auto Differential; Future  -     Comprehensive Metabolic Panel; Future  -     TSH; Future    History of myocardial infarction  -     IN OFFICE EKG 12-LEAD (to Muse)  -     CBC Auto Differential; Future  -     Comprehensive Metabolic Panel; Future  -     TSH; Future    History of coronary artery stent placement  -     IN OFFICE EKG 12-LEAD (to Muse)  -     CBC Auto Differential; Future  -     Comprehensive Metabolic Panel; Future  -     TSH; Future    Automatic implantable cardioverter-defibrillator in situ  -     IN OFFICE EKG 12-LEAD (to Muse)  -     CBC Auto Differential; Future  -      Comprehensive Metabolic Panel; Future  -     TSH; Future    Prolonged Q-T interval on ECG  -     IN OFFICE EKG 12-LEAD (to Muse)  -     CBC Auto Differential; Future  -     Comprehensive Metabolic Panel; Future  -     TSH; Future    RBBB with left anterior fascicular block  -     IN OFFICE EKG 12-LEAD (to Muse)  -     CBC Auto Differential; Future  -     Comprehensive Metabolic Panel; Future  -     TSH; Future    Hypoalbuminemia  -     IN OFFICE EKG 12-LEAD (to Muse)  -     CBC Auto Differential; Future  -     Comprehensive Metabolic Panel; Future  -     TSH; Future    Hyponatremia  -     IN OFFICE EKG 12-LEAD (to Muse)  -     CBC Auto Differential; Future  -     Comprehensive Metabolic Panel; Future  -     TSH; Future     Strategies to increase the protein in diet discussed at length    CHF and angina are stable    Same meds except try reducing the furosemide and spironolactone to qod due to low BP.  Resume daily doses of both prn edema or worse shortness of breath.    Pt has chronic low blood pressure.    RTC one month with lab      Follow up in about 4 weeks (around 8/4/2022).

## 2022-07-12 NOTE — PROGRESS NOTES
[facility]  Colon and Rectal Surgery    [address]  [place], [place]  Tel: [phone]  Fax: [phone]  [email or web address]   MD Wesley Fink MD Brian Kann, MD W. Forrest Johnston, MD Matthew Giglia, MD Jennifer Paruch, MD William Kethman, MD Danielle Kay, MD     Patient name: Odette Hart   YOB: 1955   MRN: [id number]  Date of visit: 07/12/2022    Dear Dr. Barragan,    It was a pleasure seeing Ms. Hart in the Colon and Rectal Surgery clinic here at [facility].     Ms. 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 Epstein) on 5/9/2022, complicated by readmission for management of shortness of breath/HF exacerbation, severe malnutrition, and wound care. She has been discharged and is here for follow-up.    Pathology  A.   APPENDIX, APPENDECTOMY:          -Benign appendix, without diagnostic abnormality.          -Attached portion of benign colon, without diagnostic abnormality.          -Proximal margin, negative for tumor.          -Negative for malignancy.   B.   COLON, SIGMOID AND PORTION OF CECUM, SIGMOID COLECTOMY:          -Portions of benign colon showing diverticulitis with associated mural wall thickening, serosal hemorrhage and adhesions (clinical, colovesical/colovaginal fistula).          -Surgical margins, viable benign colon, without diagnostic abnormality.          -Negative for malignancy.   C.   GALLBLADDER, CHOLECYSTECTOMY:          -Mild chronic cholecystitis.     She is walking on her own more, still spirits are low. She is  developing more of an appetite - but not perfect. She is having only intermittent nausea, no vomiting. She gained 3 lbs. She has been seeing wound care and they are marking some progress on the sacral wound. PT is pending.     The patient was informed of the availability of a certified  without charge. A certified  was not necessary for this visit.    Review of Systems  See pertinent review of systems above    Past Medical History:   Diagnosis Date    Acute coronary syndrome     Acute exacerbation of chronic obstructive pulmonary disease (COPD) 3/23/2022    Allergy     Arthritis     Cardiomyopathy     CHF (congestive heart failure)     Coronary artery disease     Coronary artery disease of native artery of native heart with stable angina pectoris 2021: [facility]: Cath: LAD: Proximal stent patent. LCX: Proximal 80%. LCX: Dominant. Moderate disease. LCX: HEVER 2.75 x 18 mm. Distal dissection. HEVER 2.75 x 22 mm.     Diverticulitis     Diverticulosis     Familial hypercholesterolemia 2022    Former smoker 2022    Heart attack     Heart disease     History of myocardial infarction 2022    Hyperlipidemia     Hypertension     Hypertension 2022    ICD (implantable cardioverter-defibrillator) in place     Non-ST elevation myocardial infarction (NSTEMI) 2019     Past Surgical History:   Procedure Laterality Date    APPENDECTOMY N/A 2022    Procedure: APPENDECTOMY;  Surgeon: Rafa Cardozo MD;  Location: [facility] OR [address] [place] [address];  Service: Colon and Rectal;  Laterality: N/A;    ATRIAL CARDIAC PACEMAKER INSERTION      CECECTOMY N/A 2022    Procedure: EXCISION, CECUM;  Surgeon: Rafa Cardozo MD;  Location: [facility] OR [address] [place] [address];  Service: Colon and Rectal;  Laterality: N/A;     SECTION      CHOLECYSTECTOMY N/A 2022    Procedure: CHOLECYSTECTOMY;  Surgeon: Doug Epstein MD;  Location: [facility] OR [address] [place] [address];  Service: General;   Laterality: N/A;    COLONOSCOPY N/A 2022    Procedure: COLONOSCOPY;  Surgeon: Rafa Cardozo MD;  Location: [facility] OR [facility] FLR;  Service: Colon and Rectal;  Laterality: N/A;    COLOSTOMY  2022    Procedure: CREATION, COLOSTOMY;  Surgeon: Rafa Cardozo MD;  Location: [facility] OR 2ND FLR;  Service: Colon and Rectal;;    CORONARY STENT PLACEMENT      LEFT HEART CATHETERIZATION Left 2022    Procedure: CATHETERIZATION, HEART, LEFT;  Surgeon: Yohannes Cordova MD;  Location: [facility] CATH LAB;  Service: Cardiology;  Laterality: Left;     Family History   Problem Relation Age of Onset    Heart disease Mother     Emphysema Father     Heart attack Brother      Social History     Tobacco Use    Smoking status: Former Smoker     Packs/day: 0.50     Start date: 1976     Quit date: 2012     Years since quittin.6    Smokeless tobacco: Never Used   Substance Use Topics    Alcohol use: No    Drug use: No     Review of patient's allergies indicates:   Allergen Reactions    Augmentin [amoxicillin-pot clavulanate] Swelling     Not allergic to amoxicillin just a derivative of augmentin    Lisinopril Other (See Comments)     Fluid around heart    Losartan Other (See Comments)     High potassium    Shellfish containing products Anaphylaxis     Pt.states she is allergic to SEAFOOD since the age of 13    Levaquin [levofloxacin] Other (See Comments)     Very bad joint pain    Clindamycin Palpitations     Chest pain       Current Outpatient Medications on File Prior to Visit   Medication Sig Dispense Refill    acetaminophen (TYLENOL) 500 MG tablet Take 2 tablets (1,000 mg total) by mouth every 8 (eight) hours as needed for Pain.  0    albuterol (PROVENTIL/VENTOLIN HFA) 90 mcg/actuation inhaler Inhale 2 puffs into the lungs every 6 (six) hours as needed for Wheezing. Rescue 18 g 1    ALPRAZolam (XANAX) 0.5 MG tablet Take 1 tablet (0.5 mg total) by mouth nightly as needed for Anxiety. 30 tablet 1     aspirin (ECOTRIN) 81 MG EC tablet Take 1 tablet (81 mg total) by mouth once daily. 90 tablet 0    carvediloL (COREG) 3.125 MG tablet Take 1 tablet (3.125 mg total) by mouth 2 (two) times daily with meals. 60 tablet 1    clopidogreL (PLAVIX) 75 mg tablet Take 1 tablet (75 mg total) by mouth once daily. 30 tablet 1    collagenase (SANTYL) ointment Apply topically once daily. 90 g 1    docusate sodium (COLACE) 100 MG capsule Take 1 capsule (100 mg total) by mouth 2 (two) times daily. 60 capsule 1    esomeprazole (NEXIUM) 40 MG capsule Take 1 capsule (40 mg total) by mouth before breakfast. 30 capsule 11    ezetimibe (ZETIA) 10 mg tablet Take 1 tablet (10 mg total) by mouth every evening. 90 tablet 3    fluticasone propionate (FLONASE) 50 mcg/actuation nasal spray 1 spray (50 mcg total) by Each Nostril route once daily. 9.9 mL 1    furosemide (LASIX) 20 MG tablet Take 1 tablet (20 mg total) by mouth once daily. 30 tablet 1    nitroGLYCERIN (NITROSTAT) 0.4 MG SL tablet Place 1 tablet (0.4 mg total) under the tongue every 5 (five) minutes as needed for Chest pain. 25 tablet 0    ondansetron (ZOFRAN-ODT) 8 MG TbDL Take 1 tablet (8 mg total) by mouth every 12 (twelve) hours as needed (nausea). 20 tablet 2    polyethylene glycol (GLYCOLAX) 17 gram/dose powder Take 17 g by mouth once daily. 510 g 1    simethicone (MYLICON) 80 MG chewable tablet Take 1 tablet (80 mg total) by mouth every 8 (eight) hours as needed for Flatulence. 120 tablet 5    spironolactone (ALDACTONE) 25 MG tablet Take half a tablet (12.5 mg total) by mouth once daily. 15 tablet 1    traMADoL (ULTRAM) 50 mg tablet Take 1 tablet (50 mg total) by mouth every 8 (eight) hours as needed for Pain. 20 tablet 0    [DISCONTINUED] metoprolol succinate (TOPROL-XL) 25 MG 24 hr tablet Take 1 tablet (25 mg total) by mouth once daily. 1 tablet 0    [DISCONTINUED] pantoprazole (PROTONIX) 40 MG tablet Take 1 tablet (40 mg total) by mouth once daily. 30  "tablet 1    [DISCONTINUED] prasugreL (EFFIENT) 10 mg Tab Take 1 tablet (10 mg total) by mouth once daily. 90 tablet 0     No current facility-administered medications on file prior to visit.     Physical Examination  BP (!) 101/51 (BP Location: Left arm, Patient Position: Sitting, BP Method: Large (Automatic))   Pulse 86   Ht 5' 3" (1.6 m)   Wt 51.3 kg (113 lb)   LMP  (LMP Unknown)   BMI 20.02 kg/m²      A chaperone was present for the physical examination.    Constitutional: well developed, no cough, some SOB - at baseline - but not on oxygen  Head: Normocephalic, no lesions, without obvious abnormality  Eye: Normal external eye, conjunctiva, and lids  Cardiovascular: regular rate and regular rhythm  Respiratory: normal air entry  Gastrointestinal: soft, non-tender, non-distended, wound explored at midline and sacral wound dressing evaluated, lower extremities not swollen  Musculoskeletal: full range of motion without pain  Neurologic: alert, oriented, normal speech, no focal findings or movement disorder noted  Psychiatric: appropriate, normal mood    Assessment and Plan of Care    Thank you again for referring Ms. Hart to my care. In summary, Ms. Hart is a 66 year old woman presenting for post-operative follow-up. We discussed treatment options and have provided the following recommendations:    1. Discussed wound care - will ultimately defer to [facility] wound care and management but recommend intermittent dry gauze dressing to midline to encourage granulation (self-debridement with dressing changes). Sacral wound seems to be making significant progress - encouraged.   2. Appreciate multi-discipline coordinated care regarding co-morbidities  3. Colonoscopy in 1 year (7/2023)   4. Follow-up in 1 month    Please do not hesitate to contact me if you have any questions.      Rafa Cardozo MD - Staff Surgeon  Department of Colon & Rectal Surgery  [facility]    "

## 2022-07-18 PROBLEM — Z87.19 HISTORY OF DIVERTICULITIS: Status: ACTIVE | Noted: 2021-01-12

## 2022-07-18 PROBLEM — J44.9 COPD (CHRONIC OBSTRUCTIVE PULMONARY DISEASE): Status: ACTIVE | Noted: 2022-03-23

## 2022-07-18 PROBLEM — R53.1 WEAKNESS: Status: ACTIVE | Noted: 2022-01-01

## 2022-07-18 PROBLEM — E46 MALNOURISHED: Status: ACTIVE | Noted: 2022-01-01

## 2022-07-18 NOTE — ED TRIAGE NOTES
65 yo female reports to ED with L sided lower abd pain surrounding colostomy bag and nausea with intermittent vomiting starting a week ago. Reports fever starting yesterday. AAOx4.

## 2022-07-18 NOTE — ED NOTES
-Open surgical wound to medial abdomen. Area surrounding wound cleaned with normal saline and dressing applied.   -Wound noted to sacrum. Packing placed by wound care nurse intact. Old dressing removed, area around wound cleaned, new foam dressing applied.   -Colostomy bag changed. Area clean. Stoma pink and moist.

## 2022-07-18 NOTE — ED PROVIDER NOTES
"SCRIBE #1 NOTE: I, Brigid Sepulveda, am scribing for, and in the presence of, Lea Watkins MD.         Source of History:  The patient    Chief complaint:  Vomiting (N/v x 1 week with leaking from colostomy and fatigue x 1 week. )      HPI:  Odette Hart is a 66 y.o. female presenting with nausea and vomiting for the past week. The patient states that she has had multiple hospital visits over the past 6 months and lost 21 pounds, including 3 over the past week. She had an abdominal abscess 2 months ago with a colostomy placed 5/9 by Dr. Cardozo and Dr. Epstein. She also has colovesical fistula. The patient now states she is unable to eat or drink without becoming extremely nauseated. She thus has not eaten or drank water over the past week. She has taken Zofran without relief. The patient also notes a fever of 101.5 degrees 2 nights ago, but reports it was a one-time occurrence. She states that she has been unable to sleep due to abdominal pain near her colostomy site. She reports this lack of sleep has made her feel fatigued, weak, and as if her "heart is weak." She further notes red/brown mucus with tissue coming from her rectum, but states it is without odor. The patient reports a sore near her rectum. She also notes a sore throat, but denies cough or rhinorrhea. She denies dysuria or urinary difficulty.    This is the extent to the patients complaints today here in the emergency department.    ROS: As per HPI and below:  General: Notes fever. No chills. Notes unexpected weight change. Notes change in appetite. Notes fatigue. Notes general weakness.  Eyes: No visual changes.  ENT: Notes sore throat. No ear pain. No rhinorrhea.  Head: No headache.    Respiratory: No shortness of breath. No cough.  Cardiovascular: No chest pain.  Abdomen: Notes nausea. Notes vomiting. Notes abdominal pain. Notes abnormal red/brown bowel movements.  Genito-Urinary: No abnormal urination.  Neurologic: No focal weakness.  No " numbness.  MSK: No back pain.  Integument: No rashes or lesions. Notes wound near rectum.  Hematologic: No easy bruising.  Endocrine: No excessive thirst or urination.    Review of patient's allergies indicates:   Allergen Reactions    Augmentin [amoxicillin-pot clavulanate] Swelling     Not allergic to amoxicillin just a derivative of augmentin    Lisinopril Other (See Comments)     Fluid around heart    Losartan Other (See Comments)     High potassium    Shellfish containing products Anaphylaxis     Pt.states she is allergic to SEAFOOD since the age of 13    Levaquin [levofloxacin] Other (See Comments)     Very bad joint pain    Clindamycin Palpitations     Chest pain       PMH:  As per HPI and below:  Past Medical History:   Diagnosis Date    Acute coronary syndrome     Acute exacerbation of chronic obstructive pulmonary disease (COPD) 3/23/2022    Allergy     Arthritis     Cardiomyopathy     CHF (congestive heart failure)     Coronary artery disease     Coronary artery disease of native artery of native heart with stable angina pectoris 4/19/2021 1/24/2022: [facility]: Cath: LAD: Proximal stent patent. LCX: Proximal 80%. LCX: Dominant. Moderate disease. LCX: HEVER 2.75 x 18 mm. Distal dissection. HEVER 2.75 x 22 mm.     Diverticulitis     Diverticulosis     Familial hypercholesterolemia 1/22/2022    Former smoker 1/24/2022    Heart attack     Heart disease     History of myocardial infarction 1/24/2022    Hyperlipidemia     Hypertension     Hypertension 1/24/2022    ICD (implantable cardioverter-defibrillator) in place     Non-ST elevation myocardial infarction (NSTEMI) 2/4/2019     Past Surgical History:   Procedure Laterality Date    APPENDECTOMY N/A 5/9/2022    Procedure: APPENDECTOMY;  Surgeon: Rafa Cardozo MD;  Location: [facility] OR [address] [place] [address];  Service: Colon and Rectal;  Laterality: N/A;    ATRIAL CARDIAC PACEMAKER INSERTION      CECECTOMY N/A 5/9/2022    Procedure: EXCISION, CECUM;  " Surgeon: Rafa Cardozo MD;  Location: [facility] OR 2ND FLR;  Service: Colon and Rectal;  Laterality: N/A;     SECTION      CHOLECYSTECTOMY N/A 2022    Procedure: CHOLECYSTECTOMY;  Surgeon: Doug Epstein MD;  Location: [facility] OR 2ND FLR;  Service: General;  Laterality: N/A;    COLONOSCOPY N/A 2022    Procedure: COLONOSCOPY;  Surgeon: Rafa Cardozo MD;  Location: [facility] OR 2ND FLR;  Service: Colon and Rectal;  Laterality: N/A;    COLOSTOMY  2022    Procedure: CREATION, COLOSTOMY;  Surgeon: Rafa Cardozo MD;  Location: [facility] OR [facility] FLR;  Service: Colon and Rectal;;    CORONARY STENT PLACEMENT      LEFT HEART CATHETERIZATION Left 2022    Procedure: CATHETERIZATION, HEART, LEFT;  Surgeon: Yohannes Cordova MD;  Location: [facility] CATH LAB;  Service: Cardiology;  Laterality: Left;       Social History     Tobacco Use    Smoking status: Former Smoker     Packs/day: 0.50     Start date: 1976     Quit date: 2012     Years since quittin.6    Smokeless tobacco: Never Used   Substance Use Topics    Alcohol use: No    Drug use: No       Physical Exam:    BP (!) 98/48 (BP Location: Right arm, Patient Position: Sitting)   Pulse 78   Temp 97.7 °F (36.5 °C) (Oral)   Resp 18   Ht 5' 3" (1.6 m)   Wt 52.6 kg (115 lb 15.4 oz)   LMP  (LMP Unknown)   SpO2 98%   Breastfeeding No   BMI 20.54 kg/m²   Nursing note and vital signs reviewed.    Appearance: No acute distress. Thin. Frail appearing.  Eyes: No conjunctival injection.  Neck: No deformity.   ENT: Oropharynx clear.  No stridor.   Chest/ Respiratory: Clear to auscultation bilaterally.  Good air movement.  No wheezes.  No rhonchi. No rales. No accessory muscle use.  Cardiovascular: Regular rate. S3 murmur. No gallops. No rubs. Strong peripheral pulses.  Abdomen: Soft.  Not distended.  Nontender.  No guarding.  No rebound. Non-peritoneal. Colostomy in place left abdomen with surrounding erythema. No fluctuance. Tender. Dark " brown stool present. Large scar in lower abdomen. No dehiscence or hernia.  Musculoskeletal: Good range of motion all joints.  No deformities.  Neck supple.  No meningismus.  Skin: No rashes seen.  Good turgor.  No abrasions.  No ecchymoses. Pale. Dry skin with some tinting. Would over sacrum:  Area of 1 cm of fat extrusion and exposure. Mild surrounding erythema and tenderness. No anal involvement.  Neurologic: Motor intact.  Sensation intact.  Cerebellar intact.  Cranial nerves intact.  Mental Status:  Alert and oriented x 3.  Appropriate, conversant.      EKG:  I independently reviewed and interpreted the EKG and my findings are as follows:   Normal sinus rhythm. Rate of 78 bpm. Intraventricular block. Left axis deviation. Anterior Q waves.    Imaging:  I independently reviewed and interpreted the patient's chest x-ray and my findings are as follows:  Cardiomegaly. Some interstitial opacification without consolidation or effusion.    IR  CT Limited   Final Result      Scheduled aspiration of midline subcutaneous anterior abdominal wall fluid collection was canceled as fluid was no longer present within the collection.  Note that the collection likely communicates with the open anterior abdominal wall wound and the fluid has likely been absorbed by the gauze packing when compared to the prior exam.         Electronically signed by: Abelardo De Leon MD   Date:    07/21/2022   Time:    15:02      CT Abdomen Pelvis With Contrast   Final Result      Interval development of a collection of air and fluid along the midline incision anterior abdominal wall measuring 2.4 x 1 cm suspicious for a small developing abscess.  This is too small for percutaneous drain at this time.      Prior partial colectomy with left upper quadrant colostomy.  Stable edema and trace fluid in the pelvis with no focal fluid collection seen.      Small pleural effusions, right greater than left.         Electronically signed by: Layla Verdugo    Date:    07/18/2022   Time:    15:02      X-Ray Chest AP Portable   Final Result      Stable chest with no acute consolidation.  Reticular opacities in both lungs unchanged.  These can be seen with edema in the acute setting or interstitial lung disease in the chronic setting.         Electronically signed by: Layla Verdugo   Date:    07/18/2022   Time:    13:29            Labs:  Results for orders placed or performed during the hospital encounter of 07/18/22   Blood culture x two cultures. Draw prior to antibiotics.    Specimen: Blood   Result Value Ref Range    Blood Culture, Routine No growth after 5 days.    Blood culture x two cultures. Draw prior to antibiotics.    Specimen: Blood   Result Value Ref Range    Blood Culture, Routine No growth after 5 days.    CBC auto differential   Result Value Ref Range    WBC 3.65 (L) 3.90 - 12.70 K/uL    RBC 3.85 (L) 4.00 - 5.40 M/uL    Hemoglobin 10.2 (L) 12.0 - 16.0 g/dL    Hematocrit 31.4 (L) 37.0 - 48.5 %    MCV 82 82 - 98 fL    MCH 26.5 (L) 27.0 - 31.0 pg    MCHC 32.5 32.0 - 36.0 g/dL    RDW 17.9 (H) 11.5 - 14.5 %    Platelets 261 150 - 450 K/uL    MPV 10.3 9.2 - 12.9 fL    Immature Granulocytes 0.0 0.0 - 0.5 %    Gran # (ANC) 0.1 (L) 1.8 - 7.7 K/uL    Immature Grans (Abs) 0.00 0.00 - 0.04 K/uL    Lymph # 2.6 1.0 - 4.8 K/uL    Mono # 0.9 0.3 - 1.0 K/uL    Eos # 0.0 0.0 - 0.5 K/uL    Baso # 0.03 0.00 - 0.20 K/uL    nRBC 0 0 /100 WBC    Gran % 2.0 (L) 38.0 - 73.0 %    Lymph % 72.3 (H) 18.0 - 48.0 %    Mono % 24.9 (H) 4.0 - 15.0 %    Eosinophil % 0.0 0.0 - 8.0 %    Basophil % 0.8 0.0 - 1.9 %    Platelet Estimate Appears normal     Aniso Slight     Poik Slight     Poly Occasional     Hypo Occasional     Differential Method Automated    Comprehensive metabolic panel   Result Value Ref Range    Sodium 128 (L) 136 - 145 mmol/L    Potassium 4.1 3.5 - 5.1 mmol/L    Chloride 91 (L) 95 - 110 mmol/L    CO2 27 23 - 29 mmol/L    Glucose 102 70 - 110 mg/dL    BUN 12 8 - 23 mg/dL     Creatinine 0.7 0.5 - 1.4 mg/dL    Calcium 8.4 (L) 8.7 - 10.5 mg/dL    Total Protein 6.7 6.0 - 8.4 g/dL    Albumin 1.8 (L) 3.5 - 5.2 g/dL    Total Bilirubin 1.5 (H) 0.1 - 1.0 mg/dL    Alkaline Phosphatase 94 55 - 135 U/L    AST 45 (H) 10 - 40 U/L    ALT 22 10 - 44 U/L    Anion Gap 10 8 - 16 mmol/L    eGFR if African American >60 >60 mL/min/1.73 m^2    eGFR if non African American >60 >60 mL/min/1.73 m^2   Lactic Acid, Plasma #1   Result Value Ref Range    Lactate (Lactic Acid) 1.6 0.5 - 2.2 mmol/L   Urinalysis, Reflex to Urine Culture Urine, Clean Catch    Specimen: Urine   Result Value Ref Range    Specimen UA Urine, Clean Catch     Color, UA Yellow Yellow, Straw, Tammy    Appearance, UA Clear Clear    pH, UA 6.0 5.0 - 8.0    Specific Gravity, UA <=1.005 (A) 1.005 - 1.030    Protein, UA Negative Negative    Glucose, UA Negative Negative    Ketones, UA Negative Negative    Bilirubin (UA) Negative Negative    Occult Blood UA 2+ (A) Negative    Nitrite, UA Negative Negative    Urobilinogen, UA 1.0 <2.0 EU/dL    Leukocytes, UA 1+ (A) Negative   Magnesium   Result Value Ref Range    Magnesium 1.7 1.6 - 2.6 mg/dL   Procalcitonin   Result Value Ref Range    Procalcitonin 0.42 (H) <0.25 ng/mL   Troponin I   Result Value Ref Range    Troponin I 0.031 (H) 0.000 - 0.026 ng/mL   Brain natriuretic peptide   Result Value Ref Range    BNP 4,567 (H) 0 - 99 pg/mL   Lipase   Result Value Ref Range    Lipase 5 4 - 60 U/L   Urinalysis Microscopic   Result Value Ref Range    RBC, UA 5 (H) 0 - 4 /hpf    WBC, UA 3 0 - 5 /hpf    Microscopic Comment SEE COMMENT    Lactic acid, plasma #2   Result Value Ref Range    Lactate (Lactic Acid) 1.4 0.5 - 2.2 mmol/L   Uric acid   Result Value Ref Range    Uric Acid 6.3 (H) 2.4 - 5.7 mg/dL   CBC auto differential   Result Value Ref Range    WBC 2.50 (L) 3.90 - 12.70 K/uL    RBC 3.73 (L) 4.00 - 5.40 M/uL    Hemoglobin 10.0 (L) 12.0 - 16.0 g/dL    Hematocrit 29.9 (L) 37.0 - 48.5 %    MCV 80 (L) 82 - 98  fL    MCH 26.8 (L) 27.0 - 31.0 pg    MCHC 33.4 32.0 - 36.0 g/dL    RDW 18.0 (H) 11.5 - 14.5 %    Platelets 262 150 - 450 K/uL    MPV 10.6 9.2 - 12.9 fL    Immature Granulocytes 0.0 0.0 - 0.5 %    Gran # (ANC) 0.1 (L) 1.8 - 7.7 K/uL    Immature Grans (Abs) 0.00 0.00 - 0.04 K/uL    Lymph # 1.6 1.0 - 4.8 K/uL    Mono # 0.8 0.3 - 1.0 K/uL    Eos # 0.0 0.0 - 0.5 K/uL    Baso # 0.02 0.00 - 0.20 K/uL    nRBC 1 (A) 0 /100 WBC    Gran % 4.0 (L) 38.0 - 73.0 %    Lymph % 64.8 (H) 18.0 - 48.0 %    Mono % 30.4 (H) 4.0 - 15.0 %    Eosinophil % 0.0 0.0 - 8.0 %    Basophil % 0.8 0.0 - 1.9 %    Platelet Estimate Appears normal     Aniso Slight     Poly Occasional     Hypo Occasional     Fragmented Cells Occasional     Differential Method Automated    Comprehensive metabolic panel   Result Value Ref Range    Sodium 131 (L) 136 - 145 mmol/L    Potassium 3.5 3.5 - 5.1 mmol/L    Chloride 96 95 - 110 mmol/L    CO2 26 23 - 29 mmol/L    Glucose 85 70 - 110 mg/dL    BUN 12 8 - 23 mg/dL    Creatinine 0.6 0.5 - 1.4 mg/dL    Calcium 8.4 (L) 8.7 - 10.5 mg/dL    Total Protein 6.1 6.0 - 8.4 g/dL    Albumin 1.5 (L) 3.5 - 5.2 g/dL    Total Bilirubin 1.5 (H) 0.1 - 1.0 mg/dL    Alkaline Phosphatase 89 55 - 135 U/L    AST 25 10 - 40 U/L    ALT 18 10 - 44 U/L    Anion Gap 9 8 - 16 mmol/L    eGFR if African American >60 >60 mL/min/1.73 m^2    eGFR if non African American >60 >60 mL/min/1.73 m^2   Basic Metabolic Panel   Result Value Ref Range    Sodium 132 (L) 136 - 145 mmol/L    Potassium 3.5 3.5 - 5.1 mmol/L    Chloride 96 95 - 110 mmol/L    CO2 27 23 - 29 mmol/L    Glucose 123 (H) 70 - 110 mg/dL    BUN 8 8 - 23 mg/dL    Creatinine 0.6 0.5 - 1.4 mg/dL    Calcium 8.0 (L) 8.7 - 10.5 mg/dL    Anion Gap 9 8 - 16 mmol/L    eGFR if African American >60 >60 mL/min/1.73 m^2    eGFR if non African American >60 >60 mL/min/1.73 m^2   CBC Without Differential   Result Value Ref Range    WBC 1.85 (LL) 3.90 - 12.70 K/uL    RBC 3.95 (L) 4.00 - 5.40 M/uL     Hemoglobin 10.5 (L) 12.0 - 16.0 g/dL    Hematocrit 32.2 (L) 37.0 - 48.5 %    MCV 82 82 - 98 fL    MCH 26.6 (L) 27.0 - 31.0 pg    MCHC 32.6 32.0 - 36.0 g/dL    RDW 18.2 (H) 11.5 - 14.5 %    Platelets 285 150 - 450 K/uL    MPV 10.4 9.2 - 12.9 fL   Basic Metabolic Panel   Result Value Ref Range    Sodium 131 (L) 136 - 145 mmol/L    Potassium 3.3 (L) 3.5 - 5.1 mmol/L    Chloride 96 95 - 110 mmol/L    CO2 25 23 - 29 mmol/L    Glucose 98 70 - 110 mg/dL    BUN 9 8 - 23 mg/dL    Creatinine 0.6 0.5 - 1.4 mg/dL    Calcium 7.8 (L) 8.7 - 10.5 mg/dL    Anion Gap 10 8 - 16 mmol/L    eGFR if African American >60 >60 mL/min/1.73 m^2    eGFR if non African American >60 >60 mL/min/1.73 m^2   CBC Without Differential   Result Value Ref Range    WBC 2.19 (L) 3.90 - 12.70 K/uL    RBC 3.75 (L) 4.00 - 5.40 M/uL    Hemoglobin 10.1 (L) 12.0 - 16.0 g/dL    Hematocrit 30.8 (L) 37.0 - 48.5 %    MCV 82 82 - 98 fL    MCH 26.9 (L) 27.0 - 31.0 pg    MCHC 32.8 32.0 - 36.0 g/dL    RDW 18.4 (H) 11.5 - 14.5 %    Platelets 293 150 - 450 K/uL    MPV 10.0 9.2 - 12.9 fL   Protime-INR   Result Value Ref Range    Prothrombin Time 12.5 9.0 - 12.5 sec    INR 1.2 0.8 - 1.2   VANCOMYCIN, TROUGH   Result Value Ref Range    Vancomycin-Trough 12.5 10.0 - 22.0 ug/mL   Basic Metabolic Panel   Result Value Ref Range    Sodium 133 (L) 136 - 145 mmol/L    Potassium 3.1 (L) 3.5 - 5.1 mmol/L    Chloride 98 95 - 110 mmol/L    CO2 26 23 - 29 mmol/L    Glucose 110 70 - 110 mg/dL    BUN 8 8 - 23 mg/dL    Creatinine 0.7 0.5 - 1.4 mg/dL    Calcium 7.7 (L) 8.7 - 10.5 mg/dL    Anion Gap 9 8 - 16 mmol/L    eGFR if African American >60 >60 mL/min/1.73 m^2    eGFR if non African American >60 >60 mL/min/1.73 m^2   CBC Without Differential   Result Value Ref Range    WBC 2.10 (L) 3.90 - 12.70 K/uL    RBC 3.66 (L) 4.00 - 5.40 M/uL    Hemoglobin 9.6 (L) 12.0 - 16.0 g/dL    Hematocrit 30.4 (L) 37.0 - 48.5 %    MCV 83 82 - 98 fL    MCH 26.2 (L) 27.0 - 31.0 pg    MCHC 31.6 (L) 32.0 - 36.0  g/dL    RDW 19.0 (H) 11.5 - 14.5 %    Platelets 301 150 - 450 K/uL    MPV 9.7 9.2 - 12.9 fL   Cortisol, 8AM   Result Value Ref Range    Cortisol, 8 AM 11.60 4.30 - 22.40 ug/dL   C-reactive protein   Result Value Ref Range    CRP 50.2 (H) 0.0 - 8.2 mg/L   Procalcitonin   Result Value Ref Range    Procalcitonin 0.05 <0.25 ng/mL   Basic Metabolic Panel   Result Value Ref Range    Sodium 132 (L) 136 - 145 mmol/L    Potassium 3.5 3.5 - 5.1 mmol/L    Chloride 96 95 - 110 mmol/L    CO2 29 23 - 29 mmol/L    Glucose 122 (H) 70 - 110 mg/dL    BUN 17 8 - 23 mg/dL    Creatinine 0.7 0.5 - 1.4 mg/dL    Calcium 7.9 (L) 8.7 - 10.5 mg/dL    Anion Gap 7 (L) 8 - 16 mmol/L    eGFR if African American >60 >60 mL/min/1.73 m^2    eGFR if non African American >60 >60 mL/min/1.73 m^2   Magnesium   Result Value Ref Range    Magnesium 1.5 (L) 1.6 - 2.6 mg/dL   CBC auto differential   Result Value Ref Range    WBC 2.72 (L) 3.90 - 12.70 K/uL    RBC 3.86 (L) 4.00 - 5.40 M/uL    Hemoglobin 10.3 (L) 12.0 - 16.0 g/dL    Hematocrit 31.9 (L) 37.0 - 48.5 %    MCV 83 82 - 98 fL    MCH 26.7 (L) 27.0 - 31.0 pg    MCHC 32.3 32.0 - 36.0 g/dL    RDW 19.5 (H) 11.5 - 14.5 %    Platelets 313 150 - 450 K/uL    MPV 9.7 9.2 - 12.9 fL    Immature Granulocytes 0.4 0.0 - 0.5 %    Gran # (ANC) 0.5 (L) 1.8 - 7.7 K/uL    Immature Grans (Abs) 0.01 0.00 - 0.04 K/uL    Lymph # 1.6 1.0 - 4.8 K/uL    Mono # 0.6 0.3 - 1.0 K/uL    Eos # 0.0 0.0 - 0.5 K/uL    Baso # 0.03 0.00 - 0.20 K/uL    nRBC 0 0 /100 WBC    Gran % 19.4 (L) 38.0 - 73.0 %    Lymph % 57.4 (H) 18.0 - 48.0 %    Mono % 21.3 (H) 4.0 - 15.0 %    Eosinophil % 0.4 0.0 - 8.0 %    Basophil % 1.1 0.0 - 1.9 %    Platelet Estimate Appears normal     Aniso Slight     Poik Slight     Poly Occasional     Hypo Occasional     Ovalocytes Occasional     Toxic Granulation Present     Smudge Cells Present     Differential Method Automated    POCT COVID-19 Rapid Screening   Result Value Ref Range    POC Rapid COVID Negative  Negative     Acceptable Yes          Initial Impression  66 y.o. female with fatigue, nausea, and vomiting for 1 week with fever a few days ago. Multiple sources of infection, including wounds. Also concerning for intraabdominal infection, abscess, fistula. Plan sepsis workup, gentle hydration, broad spectrum antibiotics. Anticipate admission.    Differential Dx:  Sepsis, bacteremia, UTI, pneumonia, cellulitis, abscess, indwelling line/catheter infection, cholecystitis, viral URI, gastroenteritis, viral syndrome, sinusitis, otitis media/externa, neoplasm, drug reaction, serotonin syndrome, intoxication/withdrawal syndrome.    MDM:    Discussed patient with Dr. Krishnamurthy as well as Dr. Cardozo, her colorectal surgeon.  We all agreed surgical intervention not indicated at this time based on CT results. Patient will be admitted to hospital medicine for broad spectrum abx and close monitoring of vitals, with wound care for sacral and abdominal wall wounds.  Patient with hyponatremia and hypochloremia, likely due to nutritional status and medication side effects. Patient given small fluid bolus of 250ML due to transient hypotension with improvement, however patient with elevated BNP >4000 and known severe heart failure with CXR also concerning for mild pulmonary edema, thus risk v/s benefit does not support large fluid support.  I discussed the patient's presentation, findings and response to treatment in the ED with the hospitalist on call Dr. Hernandez who will admit for further treatment and evaluation.                        Scribe Attestation:   Scribe #1: I performed the above scribed service and the documentation accurately describes the services I performed. I attest to the accuracy of the note.    Physician Attestation for Scribe: I, Lea Watkins MD, reviewed documentation as scribed in my presence, which is both accurate and complete.    Diagnostic Impression:    1. Cellulitis of abdominal wall    2.  Weakness    3. Hyponatremia    4. History of diverticulitis    5. Abscess    6. Abdominal wall abscess    7. Chronic obstructive pulmonary disease, unspecified COPD type    8. Chronic systolic congestive heart failure         ED Disposition Condition    Admit                   Lea Watkins MD  08/05/22 0628

## 2022-07-19 PROBLEM — E43 SEVERE PROTEIN-CALORIE MALNUTRITION: Status: ACTIVE | Noted: 2022-01-01

## 2022-07-19 NOTE — PLAN OF CARE
[facility] - Med Surg ([facility])  Initial Discharge Assessment       Primary Care Provider: Braxton Barragan MD    Admission Diagnosis: Hyponatremia [E87.1]  Weakness [R53.1]    Admission Date: 7/18/2022  Expected Discharge Date:     Discharge Barriers Identified: (P) None    Payor: [employer] / Plan: [employer] CHOICES 65 / Product Type: Medicare Advantage /     Extended Emergency Contact Information  Primary Emergency Contact: Abelardo Ortiz  Mobile Phone: [phone]  Relation: Son  Secondary Emergency Contact: Suri Ortiz  Mobile Phone: [phone]  Relation: Daughter    Discharge Plan A: (P) Home  Discharge Plan B: (P) Home      [facility] - [place], [place] - [address]  [address]  [place] [address]  Phone: [phone] Fax: [phone]    [facility]  [address]  [place] [address]  Phone: [phone] Fax: [phone]      Initial Assessment (most recent)       Adult Discharge Assessment - 07/19/22 1753          Discharge Assessment    Assessment Type Discharge Planning Assessment (P)      Confirmed/corrected address, phone number and insurance Yes (P)      Confirmed Demographics Correct on Facesheet (P)      Source of Information patient (P)      Lives With alone (P)      Do you expect to return to your current living situation? Yes (P)      Do you have help at home or someone to help you manage your care at home? No (P)      Prior to hospitilization cognitive status: Alert/Oriented (P)      Current cognitive status: Alert/Oriented (P)      Dressing/Bathing Difficulty none (P)      Equipment Currently Used at Home walker, rolling (P)      Readmission within 30 days? Yes (P)      Patient currently being followed by outpatient case management? No (P)      Do you currently have service(s) that help you manage your care at home? Yes (P)      Name and Contact number of agency [facility] (P)      Is the pt/caregiver preference to resume services with current agency Yes (P)      Do you take prescription medications? Yes (P)      Do you have prescription coverage? Yes (P)      Do you have any problems affording any of your prescribed medications? No (P)      Is the patient taking medications as prescribed? yes (P)      Who is going to help you get home at discharge? Pt will need a Lyft home. (P)      How do you get to doctors appointments? family or friend will provide (P)      Are you on dialysis? No (P)      Do you take coumadin? No (P)      Discharge Plan A Home (P)      Discharge Plan B Home (P)      DME Needed Upon Discharge  none (P)      Discharge Plan discussed with: Patient (P)      Discharge Barriers Identified None (P)                         CM met with the patient at the bedside.     Patient is alert and oriented with no communication barriers.     Prior to admission patient is independent. Patient denies the use of HH. Patient uses a RW.     Patients PCP is correct on the face sheet. Patient choice pharmacy is [facility] in [facility].    Advance directives: Unknown    Patient will need a Lyft ride home at discharge.     No CM needs identified at this time.     CM team will continue to follow.

## 2022-07-19 NOTE — ASSESSMENT & PLAN NOTE
Nutrition Problem:  Severe Protein-Calorie Malnutrition  Malnutrition in the context of Chronic Illness/Injury    Related to (etiology):  Altered GI function/ poor appetite     Signs and Symptoms (as evidenced by):  Energy Intake: <75% of estimated energy requirement for 1 month  Body Fat Depletion: severe depletion of orbitals and thoracic and lumbar region   Muscle Mass Depletion: severe depletion of temples and scapular region     Interventions(treatment strategy):  Collaboration with other healthcare providers  Clear liquid- cardiac diet  ONS    Nutrition Diagnosis Status:  New

## 2022-07-19 NOTE — H&P
[facility] Medicine  History & Physical    Patient Name: Odette Hart  MRN: [id number]  Patient Class: IP- Inpatient  Admission Date: 7/18/2022  Attending Physician: Brandon Anderson MD   Primary Care Provider: Braxton Barragan MD         Patient information was obtained from patient and ER records.     Subjective:     Principal Problem:History of diverticulitis    Chief Complaint:   Chief Complaint   Patient presents with    Vomiting     N/v x 1 week with leaking from colostomy and fatigue x 1 week.         HPI: Ms. Pino is a 66-year-old female with a past medical history that includes adiverticulitis s/p cystoscopy with BL ureteral catheters, open sigmoid colon resection, appendectomy, and creation of end colostomy on 5/9/22.  She also has coronary artery disease with stents and cardiomyopathy, heart failure and AICD placement.  She seems wound care outpatient for sacral wound and a right lower abdomen surgical wound.  Patient came to emergency department today due to pain to the area of skin around her colostomy and weakness.  She reports she has not been eating and drinking for the past week.  She has pain to her stomach with intake of solids or liquids.  Patient also reports that she had the appearance of blood clots come out of her rectum and some blood on her underwear indicating some red blood from her vagina.  Patient does ambulate.  She has a sacral wound that she sees wound care Tuesday through Friday outpatient.  She reports she is not taking medications since last week due to her nausea.  She lives at home by herself with help from her grandson.  General surgery has been consulted due to concern for a possible abscess.  Patient admitted to hospital medicine for further management.      Past Medical History:   Diagnosis Date    Acute coronary syndrome     Acute exacerbation of chronic obstructive pulmonary disease (COPD) 3/23/2022    Allergy     Arthritis      Cardiomyopathy     CHF (congestive heart failure)     Coronary artery disease     Coronary artery disease of native artery of native heart with stable angina pectoris 2021: [facility]: Cath: LAD: Proximal stent patent. LCX: Proximal 80%. LCX: Dominant. Moderate disease. LCX: HEVER 2.75 x 18 mm. Distal dissection. HEVER 2.75 x 22 mm.     Diverticulitis     Diverticulosis     Familial hypercholesterolemia 2022    Former smoker 2022    Heart attack     Heart disease     History of myocardial infarction 2022    Hyperlipidemia     Hypertension     Hypertension 2022    ICD (implantable cardioverter-defibrillator) in place     Non-ST elevation myocardial infarction (NSTEMI) 2019       Past Surgical History:   Procedure Laterality Date    APPENDECTOMY N/A 2022    Procedure: APPENDECTOMY;  Surgeon: Rafa Cardozo MD;  Location: [facility] OR 2ND FLR;  Service: Colon and Rectal;  Laterality: N/A;    ATRIAL CARDIAC PACEMAKER INSERTION      CECECTOMY N/A 2022    Procedure: EXCISION, CECUM;  Surgeon: Rafa Cardozo MD;  Location: [facility] OR 2ND FLR;  Service: Colon and Rectal;  Laterality: N/A;     SECTION      CHOLECYSTECTOMY N/A 2022    Procedure: CHOLECYSTECTOMY;  Surgeon: Doug Epstein MD;  [facility];  Service: General;  Laterality: N/A;    COLONOSCOPY N/A 2022    Procedure: COLONOSCOPY;  Surgeon: Rafa Cardozo MD;  Location: [facility] OR [facility] FLR;  Service: Colon and Rectal;  Laterality: N/A;    COLOSTOMY  2022    Procedure: CREATION, COLOSTOMY;  Surgeon: Rafa Cardozo MD;  Location: [facility] OR 2ND FLR;  Service: Colon and Rectal;;    CORONARY STENT PLACEMENT      LEFT HEART CATHETERIZATION Left 2022    Procedure: CATHETERIZATION, HEART, LEFT;  Surgeon: Yohannes Cordova MD;  Location: [facility] CATH LAB;  Service: Cardiology;  Laterality: Left;       Review of patient's allergies indicates:   Allergen Reactions     Augmentin [amoxicillin-pot clavulanate] Swelling     Not allergic to amoxicillin just a derivative of augmentin    Lisinopril Other (See Comments)     Fluid around heart    Losartan Other (See Comments)     High potassium    Shellfish containing products Anaphylaxis     Pt.states she is allergic to SEAFOOD since the age of 13    Levaquin [levofloxacin] Other (See Comments)     Very bad joint pain    Clindamycin Palpitations     Chest pain       No current facility-administered medications on file prior to encounter.     Current Outpatient Medications on File Prior to Encounter   Medication Sig    acetaminophen (TYLENOL) 500 MG tablet Take 2 tablets (1,000 mg total) by mouth every 8 (eight) hours as needed for Pain.    albuterol (PROVENTIL/VENTOLIN HFA) 90 mcg/actuation inhaler Inhale 2 puffs into the lungs every 6 (six) hours as needed for Wheezing. Rescue    ALPRAZolam (XANAX) 0.5 MG tablet Take 1 tablet (0.5 mg total) by mouth nightly as needed for Anxiety.    aspirin (ECOTRIN) 81 MG EC tablet Take 1 tablet (81 mg total) by mouth once daily.    carvediloL (COREG) 3.125 MG tablet Take 1 tablet (3.125 mg total) by mouth 2 (two) times daily with meals.    clopidogreL (PLAVIX) 75 mg tablet Take 1 tablet (75 mg total) by mouth once daily.    docusate sodium (COLACE) 100 MG capsule Take 1 capsule (100 mg total) by mouth 2 (two) times daily.    esomeprazole (NEXIUM) 40 MG capsule Take 1 capsule (40 mg total) by mouth before breakfast.    ezetimibe (ZETIA) 10 mg tablet Take 1 tablet (10 mg total) by mouth every evening.    fluticasone propionate (FLONASE) 50 mcg/actuation nasal spray 1 spray (50 mcg total) by Each Nostril route once daily.    furosemide (LASIX) 20 MG tablet Take 1 tablet (20 mg total) by mouth once daily.    ondansetron (ZOFRAN-ODT) 8 MG TbDL Take 1 tablet (8 mg total) by mouth every 12 (twelve) hours as needed (nausea).    polyethylene glycol (GLYCOLAX) 17 gram/dose powder Take 17 g by  mouth once daily.    simethicone (MYLICON) 80 MG chewable tablet Take 1 tablet (80 mg total) by mouth every 8 (eight) hours as needed for Flatulence.    spironolactone (ALDACTONE) 25 MG tablet Take half a tablet (12.5 mg total) by mouth once daily.    traMADoL (ULTRAM) 50 mg tablet Take 1 tablet (50 mg total) by mouth every 8 (eight) hours as needed for Pain.    collagenase (SANTYL) ointment Apply topically once daily.    nitroGLYCERIN (NITROSTAT) 0.4 MG SL tablet Place 1 tablet (0.4 mg total) under the tongue every 5 (five) minutes as needed for Chest pain.    [DISCONTINUED] metoprolol succinate (TOPROL-XL) 25 MG 24 hr tablet Take 1 tablet (25 mg total) by mouth once daily.    [DISCONTINUED] pantoprazole (PROTONIX) 40 MG tablet Take 1 tablet (40 mg total) by mouth once daily.    [DISCONTINUED] prasugreL (EFFIENT) 10 mg Tab Take 1 tablet (10 mg total) by mouth once daily.     Family History       Problem Relation (Age of Onset)    Emphysema Father    Heart attack Brother    Heart disease Mother          Tobacco Use    Smoking status: Former Smoker     Packs/day: 0.50     Start date: 1976     Quit date: 2012     Years since quittin.6    Smokeless tobacco: Never Used   Substance and Sexual Activity    Alcohol use: No    Drug use: No    Sexual activity: Not on file     Review of Systems   Constitutional:  Positive for appetite change and fatigue. Negative for activity change, chills and fever.   HENT:  Negative for congestion and trouble swallowing.    Eyes:  Negative for photophobia and visual disturbance.   Respiratory:  Positive for shortness of breath. Negative for cough and wheezing.    Cardiovascular:  Negative for chest pain, palpitations and leg swelling.   Gastrointestinal:  Positive for abdominal pain, anal bleeding, nausea and vomiting. Negative for diarrhea.   Endocrine: Negative for polydipsia and polyuria.   Genitourinary:  Positive for vaginal bleeding. Negative for dysuria and  frequency.   Musculoskeletal:  Negative for arthralgias and gait problem.   Skin:  Positive for wound (Sacral and abdominal). Negative for rash.   Neurological:  Positive for weakness. Negative for dizziness, speech difficulty and headaches. Tremors: Generalized.  Psychiatric/Behavioral:  Negative for confusion and sleep disturbance.    Objective:     Vital Signs (Most Recent):  Temp: 97.4 °F (36.3 °C) (07/18/22 2006)  Pulse: 81 (07/18/22 2006)  Resp: 16 (07/18/22 2006)  BP: (!) 107/57 (07/18/22 2006)  SpO2: 97 % (07/18/22 2006)   Vital Signs (24h Range):  Temp:  [96.6 °F (35.9 °C)-97.8 °F (36.6 °C)] 97.4 °F (36.3 °C)  Pulse:  [71-81] 81  Resp:  [16-26] 16  SpO2:  [97 %-100 %] 97 %  BP: ()/(50-63) 107/57     Weight: 51.3 kg (113 lb)  Body mass index is 20.02 kg/m².    Physical Exam  Constitutional:       General: She is not in acute distress.     Appearance: She is not ill-appearing.   HENT:      Head: Normocephalic and atraumatic.      Nose: No congestion.      Mouth/Throat:      Mouth: Mucous membranes are moist.      Pharynx: Oropharynx is clear.   Eyes:      General:         Right eye: No discharge.         Left eye: No discharge.      Extraocular Movements: Extraocular movements intact.      Pupils: Pupils are equal, round, and reactive to light.   Cardiovascular:      Rate and Rhythm: Normal rate and regular rhythm.      Heart sounds: Normal heart sounds. No murmur heard.  Pulmonary:      Effort: Pulmonary effort is normal. No respiratory distress.      Breath sounds: Normal breath sounds. No wheezing or rhonchi.   Abdominal:      General: There is no distension.      Palpations: Abdomen is soft.      Tenderness: There is abdominal tenderness. There is no guarding.      Comments: Colostomy/decreased bowel sounds   Musculoskeletal:         General: No swelling or tenderness. Normal range of motion.      Cervical back: Normal range of motion. No rigidity or tenderness.   Skin:     General: Skin is warm.       Findings: No lesion or rash.   Neurological:      General: No focal deficit present.      Mental Status: She is alert and oriented to person, place, and time.      Sensory: No sensory deficit.      Gait: Gait normal.   Psychiatric:         Mood and Affect: Mood normal.         Behavior: Behavior normal.         CRANIAL NERVES     CN III, IV, VI   Pupils are equal, round, and reactive to light.     Significant Labs: All pertinent labs within the past 24 hours have been reviewed.  A1C:   Recent Labs   Lab 05/14/22  0850 05/24/22  0018 06/08/22  0107   HGBA1C 4.9 5.0 4.9     Blood Culture: No results for input(s): LABBLOO in the last 48 hours.  BMP:   Recent Labs   Lab 07/18/22  1300      *   K 4.1   CL 91*   CO2 27   BUN 12   CREATININE 0.7   CALCIUM 8.4*   MG 1.7     CBC:   Recent Labs   Lab 07/18/22  1300   WBC 3.65*   HGB 10.2*   HCT 31.4*        CMP:   Recent Labs   Lab 07/18/22  1300   *   K 4.1   CL 91*   CO2 27      BUN 12   CREATININE 0.7   CALCIUM 8.4*   PROT 6.7   ALBUMIN 1.8*   BILITOT 1.5*   ALKPHOS 94   AST 45*   ALT 22   ANIONGAP 10   EGFRNONAA >60     Lactic Acid:   Recent Labs   Lab 07/18/22  1300 07/18/22  1858   LACTATE 1.6 1.4     Lipase:   Recent Labs   Lab 07/18/22  1300   LIPASE 5     Magnesium:   Recent Labs   Lab 07/18/22  1300   MG 1.7     TSH:   Recent Labs   Lab 03/15/22  1254   TSH 1.553     Urine Culture: No results for input(s): LABURIN in the last 48 hours.  Urine Studies:   Recent Labs   Lab 07/18/22  1322   COLORU Yellow   APPEARANCEUA Clear   PHUR 6.0   SPECGRAV <=1.005*   PROTEINUA Negative   GLUCUA Negative   KETONESU Negative   BILIRUBINUA Negative   OCCULTUA 2+*   NITRITE Negative   UROBILINOGEN 1.0   LEUKOCYTESUR 1+*   RBCUA 5*   WBCUA 3       Significant Imaging: I have reviewed all pertinent imaging results/findings within the past 24 hours.  CT Abdomen Pelvis With Contrast  Narrative: EXAMINATION:  CT ABDOMEN PELVIS WITH CONTRAST    CLINICAL  HISTORY:  Abdominal abscess/infection suspected;    TECHNIQUE:  Low dose axial images, sagittal and coronal reformations were obtained from the lung bases to the pubic symphysis following the IV administration of 75 mL of Omnipaque 350.    COMPARISON:  06/06/2022    FINDINGS:  The lung bases show small pleural effusions, right greater than left, similar compared to prior.  Persistent reticular opacities at the lung bases which can be seen with mild edema or underlying interstitial lung disease.    The liver is homogeneous.  Gallbladder is unremarkable.  Spleen and pancreas are unremarkable.    Adrenal glands are normal.  Kidneys concentrate contrast appropriately.  The urinary bladder is unremarkable.    Ectasia of the infrarenal abdominal aorta measuring 2 cm.  Atherosclerosis.  No retroperitoneal or mesenteric lymph node enlargement seen.    Stomach and loops of bowel are normal caliber.  Duodenal diverticulum.  Left upper quadrant colostomy with postoperative changes at the level of the rectum.  Mild edema and trace free fluid in the pelvis, similar compared to prior.    Regional skeleton shows degenerative change L5-S1.  Impression: Interval development of a collection of air and fluid along the midline incision anterior abdominal wall measuring 2.4 x 1 cm suspicious for a small developing abscess.  This is too small for percutaneous drain at this time.    Prior partial colectomy with left upper quadrant colostomy.  Stable edema and trace fluid in the pelvis with no focal fluid collection seen.    Small pleural effusions, right greater than left.    Electronically signed by: Layla Verdugo  Date:    07/18/2022  Time:    15:02  X-Ray Chest AP Portable  Narrative: EXAMINATION:  XR CHEST AP PORTABLE    CLINICAL HISTORY:  Sepsis;    TECHNIQUE:  Single frontal view of the chest was performed.    COMPARISON:  06/08/2022    FINDINGS:  Lungs are well expanded.  No acute consolidation, pleural effusion, or pneumothorax.   Reticular opacities in both lungs unchanged.    Cardiac silhouette is mildly enlarged, stable.  Left chest wall pacing device with leads in stable position  Impression: Stable chest with no acute consolidation.  Reticular opacities in both lungs unchanged.  These can be seen with edema in the acute setting or interstitial lung disease in the chronic setting.    Electronically signed by: Layla Verdugo  Date:    07/18/2022  Time:    13:29      Assessment/Plan:     * History of diverticulitis  Colovesical Fistula  Nausea/Vomiting  Hyponatremia  Patient with a recent colon resection and colostomy creation in May due to complicated diverticulitis.  She had a wound dehiscence and a colovesical fistula.  She reports difficulty with intake of solids and liquids.  Nausea and vomiting began about a week ago. She also reports pain surrounding her colostomy site and epigastric pain.  Patient has not been taking medications at home due to the nausea.  Patient last vomited this morning.  She reports blood clots came out of her rectum today and she had some possible vaginal bleeding.    -WBC: 3.65  -lactic: 3.65  -H/H: 10/31  -Na 128  -CTA abdomen/pelvis:  Interval development of a collection of air and fluid along the midline incision anterior abdominal wall measuring 2.4 x 1 cm; possible suspicious for a small developing abscess, too small for percutaneous drain at this time. Stable edema and trace fluid in the pelvis with no focal fluid collection seen.    Plan: In the ED, Dr Krishnamurthy was consulted and Dr. Cardozo was contacted(colorectal surgeon). Dr Cardozo recommended IV abx. Pt received cefepime 2g IV in the ED.            Hyponatremia  Pt is asymptomatic. Pt has been having decreased sodium since May. 128 is her lowest.   Likely hypovolemic hyponatremia due to decreased intake.  Uric acid and urine sodium pending. Restrict free water.  Will conservatively manage due to her elevated BNP.      Chronic congestive heart  failure  BNP 4567, last admit it was greater than 4900.  Since May it has been running over 2000. Pt is not hypoxic on room air. Her lungs sound clear to auscultation. No lower extremity edema present. Pt has not been taking her diuretics aldactone/lasix at home due to nausea and vomiting. Chest xray and CTA ab/pelvis indicate small, bilateral pleural effusions. Holding diuresis to not further hyponatremia, as her BNP elevation is along baseline and she is asymptomatic at this time.     Alteration in skin integrity in adult  Sacral wound and healing abdominal wound. Wound care consulted.      COPD (chronic obstructive pulmonary disease)  Unable to locate PFTs. Pt is a former smoker. PRN duo-nebs ordered.      CAD (coronary artery disease)  Patient with history of coronary artery disease, cardiac stents placed in January, cardiomyopathy, and AICD since 2014. Home medications includes carvedilol, Plavix, and ezetimibe.      Malnourished  Weakness  Albumin 1.8. Pt with poor intake. Nutritionist consulted.         VTE Risk Mitigation (From admission, onward)         Ordered     IP VTE HIGH RISK PATIENT  Once         07/18/22 1652     Place sequential compression device  Until discontinued         07/18/22 1652                   Martha Khan DNP  Department of Hospital Medicine   [facility] ([facility])

## 2022-07-19 NOTE — ASSESSMENT & PLAN NOTE
BNP 4567, last admit it was greater than 4900.  Since May it has been running over 2000. Pt is not hypoxic on room air. Her lungs sound clear to auscultation. No lower extremity edema present. Pt has not been taking her diuretics aldactone/lasix at home due to nausea and vomiting. Chest xray and CTA ab/pelvis indicate small, bilateral pleural effusions. Holding diuresis to not further hyponatremia, as her BNP elevation is along baseline and she is asymptomatic at this time.

## 2022-07-19 NOTE — ASSESSMENT & PLAN NOTE
Improving. Pt is asymptomatic. Pt has been having decreased sodium since May. 128 is her lowest.   Likely hypovolemic hyponatremia due to decreased intake.  Uric acid and urine sodium pending. Restrict free water.  Will conservatively manage due to her elevated BNP.

## 2022-07-19 NOTE — PROGRESS NOTES
[facility] Medicine  Progress Note    Patient Name: Odette Hart  MRN: [id number]  Patient Class: IP- Inpatient   Admission Date: 7/18/2022  Length of Stay: 1 days  Attending Physician: Brandon Anderson MD  Primary Care Provider: Braxton Barragan MD        Subjective:     Principal Problem:History of diverticulitis        HPI:  Ms. Pino is a 66-year-old female with a past medical history that includes adiverticulitis s/p cystoscopy with BL ureteral catheters, open sigmoid colon resection, appendectomy, and creation of end colostomy on 5/9/22.  She also has coronary artery disease with stents and cardiomyopathy, heart failure and AICD placement.  She seems wound care outpatient for sacral wound and a right lower abdomen surgical wound.  Patient came to emergency department today due to pain to the area of skin around her colostomy and weakness.  She reports she has not been eating and drinking for the past week.  She has pain to her stomach with intake of solids or liquids.  Patient also reports that she had the appearance of blood clots come out of her rectum and some blood on her underwear indicating some red blood from her vagina.  Patient does ambulate.  She has a sacral wound that she sees wound care Tuesday through Friday outpatient.  She reports she is not taking medications since last week due to her nausea.  She lives at home by herself with help from her grandson.  General surgery has been consulted due to concern for a possible abscess.  Patient admitted to hospital medicine for further management.      Overview/Hospital Course:  Foreign body removed from open abdominal wound by wound care.      Interval History: chronic abdominal pain with open abdominal and sacral wounds. She endorses prior augmentin reaction. A foreign body (unclear if suture material or other object) was removed from the wound.    Review of Systems   Constitutional:  Positive for chills, fatigue  and fever.   HENT:  Positive for congestion. Negative for ear pain.    Respiratory:  Negative for shortness of breath.    Cardiovascular:  Negative for chest pain.   Musculoskeletal:  Negative for back pain.   Skin:  Positive for wound.   Objective:     Vital Signs (Most Recent):  Temp: 98 °F (36.7 °C) (07/19/22 1144)  Pulse: 70 (07/19/22 1144)  Resp: 18 (07/19/22 1144)  BP: (!) 98/50 (07/19/22 1144)  SpO2: 96 % (07/19/22 1144)   Vital Signs (24h Range):  Temp:  [97.4 °F (36.3 °C)-98 °F (36.7 °C)] 98 °F (36.7 °C)  Pulse:  [64-88] 70  Resp:  [16-21] 18  SpO2:  [96 %-100 %] 96 %  BP: ()/(50-60) 98/50     Weight: 52.6 kg (115 lb 15.4 oz)  Body mass index is 20.54 kg/m².    Intake/Output Summary (Last 24 hours) at 7/19/2022 1409  Last data filed at 7/19/2022 1112  Gross per 24 hour   Intake 300 ml   Output 950 ml   Net -650 ml      Physical Exam  Vitals reviewed.   Constitutional:       General: She is not in acute distress.     Comments: Chronically ill appearing   HENT:      Head: Normocephalic.   Eyes:      Pupils: Pupils are equal, round, and reactive to light.   Cardiovascular:      Rate and Rhythm: Normal rate and regular rhythm.   Pulmonary:      Breath sounds: No wheezing or rales.   Abdominal:      Comments: Bowel sounds present. Chronic abdominal wound with fibrinous material adjacent to the wound.    Musculoskeletal:         General: No swelling. Normal range of motion.      Cervical back: Neck supple.   Skin:     General: Skin is warm.      Comments: Sacral wound through to fat, noted on admission.    Neurological:      General: No focal deficit present.      Mental Status: She is alert and oriented to person, place, and time.       Significant Labs: All pertinent labs within the past 24 hours have been reviewed.    Significant Imaging: I have reviewed all pertinent imaging results/findings within the past 24 hours.      Assessment/Plan:      * History of diverticulitis  Continue vancomycin  Continue  ceftriaxone  Continue flagyl  General surgery consulted           Severe protein-calorie malnutrition  Albumin 1.8. Pt with poor intake. Nutritionist consulted.       COPD (chronic obstructive pulmonary disease)  Not in exacerbation. Unable to locate PFTs. Pt is a former smoker. PRN duo-nebs ordered.      CAD (coronary artery disease)  Continue aspirin  Continue plavix  Continue ezetimibe      Hyponatremia  Improving. Pt is asymptomatic. Pt has been having decreased sodium since May. 128 is her lowest.   Likely hypovolemic hyponatremia due to decreased intake.  Uric acid and urine sodium pending. Restrict free water.  Will conservatively manage due to her elevated BNP.      Alteration in skin integrity in adult  Sacral wound and healing abdominal wound. Wound care consulted.      Colovesical fistula  Surgical consult      Chronic congestive heart failure  BNP 4567, last admit it was greater than 4900.  Since May it has been running over 2000. Pt is not hypoxic on room air. Her lungs sound clear to auscultation. No lower extremity edema present. Pt has not been taking her diuretics aldactone/lasix at home due to nausea and vomiting. Chest xray and CTA ab/pelvis indicate small, bilateral pleural effusions. Holding diuresis to not further hyponatremia, as her BNP elevation is along baseline and she is asymptomatic at this time.       VTE Risk Mitigation (From admission, onward)         Ordered     IP VTE HIGH RISK PATIENT  Once         07/18/22 1652     Place sequential compression device  Until discontinued         07/18/22 1652                Discharge Planning   SIVAN:      Code Status: Full Code   Is the patient medically ready for discharge?:     Reason for patient still in hospital (select all that apply): Treatment and Consult recommendations                     Brandon Anderson MD  Department of Hospital Medicine   [facility]

## 2022-07-19 NOTE — HOSPITAL COURSE
Chronically ill patient with malnutrition and nonhealing, open abdominal wound. She was thought to have abdominal wall soft tissue infection. A midline fluid collection seen on initial CT scan was found to have resolved on repeat CT scan. There was discussion about placing a wound VAC, but the patient has retained foreign material (sutures) with rounded wound edges and is not a candidate for a wound VAC (this was discussed with Wound Care and General surgery). Patient was treated with 5 days of antibiotics (skin and soft tissue infection) and stable for discharge. She was instructed to follow up with her primary general surgeon at [facility] and outpatient wound care.    She had borderline systolic blood pressure for which her beta blockers, ace inhibitor, and potassium sparing diuretic were held throughout her admission. This appears to be her baseline blood pressure. She will continue PO lasix upon discharge, but other medications that would induce hypotension at home (coreg and spironolactone) will be held pending re-evaluation in cardiology clinic.

## 2022-07-19 NOTE — PROGRESS NOTES
Pharmacokinetic Initial Assessment: IV Vancomycin    Assessment/Plan:    Initiate intravenous vancomycin with loading dose of 1250 mg once followed by a maintenance dose of vancomycin 750 mg IV every 12 hours  Desired empiric serum trough concentration is 10 to 20 mcg/mL  Draw vancomycin trough level 30 min prior to next dose on 07/21/22 at approximately 10:30  Pharmacy will continue to follow and monitor vancomycin.      Please contact pharmacy at extension [phone] with any questions regarding this assessment.     Thank you for the consult,   Hitesh Wade       Patient brief summary:  Odette Hart is a 66 y.o. female initiated on antimicrobial therapy with IV Vancomycin for treatment of suspected skin & soft tissue infection    Drug Allergies:   Review of patient's allergies indicates:   Allergen Reactions    Augmentin [amoxicillin-pot clavulanate] Swelling     Not allergic to amoxicillin just a derivative of augmentin    Lisinopril Other (See Comments)     Fluid around heart    Losartan Other (See Comments)     High potassium    Shellfish containing products Anaphylaxis     Pt.states she is allergic to SEAFOOD since the age of 13    Levaquin [levofloxacin] Other (See Comments)     Very bad joint pain    Clindamycin Palpitations     Chest pain       Actual Body Weight:   52.6 kg     Renal Function:   Estimated Creatinine Clearance: 77.3 mL/min (based on SCr of 0.6 mg/dL).,     Dialysis Method (if applicable):  N/A    CBC (last 72 hours):  Recent Labs   Lab Result Units 07/18/22  1300 07/19/22  0419   WBC K/uL 3.65* 2.50*   Hemoglobin g/dL 10.2* 10.0*   Hematocrit % 31.4* 29.9*   Platelets K/uL 261 262   Gran % % 2.0* 4.0*   Lymph % % 72.3* 64.8*   Mono % % 24.9* 30.4*   Eosinophil % % 0.0 0.0   Basophil % % 0.8 0.8   Differential Method  Automated Automated       Metabolic Panel (last 72 hours):  Recent Labs   Lab Result Units 07/18/22  1300 07/18/22  1322 07/19/22  0419   Sodium mmol/L 128*  --  131*   Potassium  mmol/L 4.1  --  3.5   Chloride mmol/L 91*  --  96   CO2 mmol/L 27  --  26   Glucose mg/dL 102  --  85   Glucose, UA   --  Negative  --    BUN mg/dL 12  --  12   Creatinine mg/dL 0.7  --  0.6   Albumin g/dL 1.8*  --  1.5*   Total Bilirubin mg/dL 1.5*  --  1.5*   Alkaline Phosphatase U/L 94  --  89   AST U/L 45*  --  25   ALT U/L 22  --  18   Magnesium mg/dL 1.7  --   --        Drug levels (last 3 results):  No results for input(s): VANCOMYCINRA, VANCORANDOM, VANCOMYCINPE, VANCOPEAK, VANCOMYCINTR, VANCOTROUGH in the last 72 hours.    Microbiologic Results:  Microbiology Results (last 7 days)       Procedure Component Value Units Date/Time    Blood culture x two cultures. Draw prior to antibiotics. [[id number]] Collected: 07/18/22 1223    Order Status: Completed Specimen: Blood Updated: 07/19/22 0115     Blood Culture, Routine No Growth to date    Narrative:      Aerobic and anaerobic    Blood culture x two cultures. Draw prior to antibiotics. [[id number]] Collected: 07/18/22 1223    Order Status: Completed Specimen: Blood Updated: 07/19/22 0115     Blood Culture, Routine No Growth to date    Narrative:      Aerobic and anaerobic

## 2022-07-19 NOTE — ASSESSMENT & PLAN NOTE
Colovesical Fistula  Nausea/Vomiting  Hyponatremia  Patient with a recent colon resection and colostomy creation in May due to complicated diverticulitis.  She had a wound dehiscence and a colovesical fistula.  She reports difficulty with intake of solids and liquids.  Nausea and vomiting began about a week ago. She also reports pain surrounding her colostomy site and epigastric pain.  Patient has not been taking medications at home due to the nausea.  Patient last vomited this morning.  She reports blood clots came out of her rectum today and she had some possible vaginal bleeding.    -WBC: 3.65  -lactic: 3.65  -H/H: 10/31  -Na 128  -CTA abdomen/pelvis:  Interval development of a collection of air and fluid along the midline incision anterior abdominal wall measuring 2.4 x 1 cm; possible suspicious for a small developing abscess, too small for percutaneous drain at this time. Stable edema and trace fluid in the pelvis with no focal fluid collection seen.    Plan: In the ED, Dr Krishnamurthy was consulted and Dr. Cardozo was contacted(colorectal surgeon). Dr Cardozo recommended IV abx. Pt received cefepime 2g IV in the ED.

## 2022-07-19 NOTE — PLAN OF CARE
Recommendations  1. Continue clear liquid diet with FR per MD orders.    -when medically able advance to cardiac diet : consider adding [facility] Peptide 1.5 TID.   2.Add Ezra BID mix with 8 fl oz of fluid of choice to promote wound healing.   3.RD to monitor diet advancement, tolerance, wt changes, and labs.    Goals: Diet to advance by RD f/u.  Nutrition Goal Status: new  Communication of RD Recs:  (POC)

## 2022-07-19 NOTE — ASSESSMENT & PLAN NOTE
Patient with history of coronary artery disease, cardiac stents placed in January, cardiomyopathy, and AICD since 2014. Home medications includes carvedilol, Plavix, and ezetimibe.

## 2022-07-19 NOTE — HPI
Ms. Pino is a 66-year-old female with a past medical history that includes adiverticulitis s/p cystoscopy with BL ureteral catheters, open sigmoid colon resection, appendectomy, and creation of end colostomy on 5/9/22.  She also has coronary artery disease with stents and cardiomyopathy, heart failure and AICD placement.  She seems wound care outpatient for sacral wound and a right lower abdomen surgical wound.  Patient came to emergency department today due to pain to the area of skin around her colostomy and weakness.  She reports she has not been eating and drinking for the past week.  She has pain to her stomach with intake of solids or liquids.  Patient also reports that she had the appearance of blood clots come out of her rectum and some blood on her underwear indicating some red blood from her vagina.  Patient does ambulate.  She has a sacral wound that she sees wound care Tuesday through Friday outpatient.  She reports she is not taking medications since last week due to her nausea.  She lives at home by herself with help from her grandson.  General surgery has been consulted due to concern for a possible abscess.  Patient admitted to hospital medicine for further management.

## 2022-07-19 NOTE — SUBJECTIVE & OBJECTIVE
Past Medical History:   Diagnosis Date    Acute coronary syndrome     Acute exacerbation of chronic obstructive pulmonary disease (COPD) 3/23/2022    Allergy     Arthritis     Cardiomyopathy     CHF (congestive heart failure)     Coronary artery disease     Coronary artery disease of native artery of native heart with stable angina pectoris 2021: [facility]: Cath: LAD: Proximal stent patent. LCX: Proximal 80%. LCX: Dominant. Moderate disease. LCX: HEVER 2.75 x 18 mm. Distal dissection. HEVER 2.75 x 22 mm.     Diverticulitis     Diverticulosis     Familial hypercholesterolemia 2022    Former smoker 2022    Heart attack     Heart disease     History of myocardial infarction 2022    Hyperlipidemia     Hypertension     Hypertension 2022    ICD (implantable cardioverter-defibrillator) in place     Non-ST elevation myocardial infarction (NSTEMI) 2019       Past Surgical History:   Procedure Laterality Date    APPENDECTOMY N/A 2022    Procedure: APPENDECTOMY;  Surgeon: Rafa Cardozo MD;  Location: [facility] 2ND FLR;  Service: Colon and Rectal;  Laterality: N/A;    ATRIAL CARDIAC PACEMAKER INSERTION      CECECTOMY N/A 2022    Procedure: EXCISION, CECUM;  Surgeon: Rafa Cardozo MD;  Location: [facility] 2ND F[facility];  Service: Colon and Rectal;  Laterality: N/A;     SECTION      CHOLECYSTECTOMY N/A 2022    Procedure: CHOLECYSTECTOMY;  Surgeon: Doug Epstein MD;  Location: [facility] FLR;  Service: General;  Laterality: N/A;    COLONOSCOPY N/A 2022    Procedure: COLONOSCOPY;  Surgeon: Rafa Cardozo MD;  Location: [facility];  Service: Colon and Rectal;  Laterality: N/A;    COLOSTOMY  2022    Procedure: CREATION, COLOSTOMY;  Surgeon: Rafa Cardozo MD;  Location: [facility] F[facility];  Service: Colon and Rectal;;    CORONARY STENT PLACEMENT      LEFT HEART CATHETERIZATION Left 2022    Procedure: CATHETERIZATION, HEART, LEFT;  Surgeon: Yohannes  MD Tasha;  Location: [facility] CATH LAB;  Service: Cardiology;  Laterality: Left;       Review of patient's allergies indicates:   Allergen Reactions    Augmentin [amoxicillin-pot clavulanate] Swelling     Not allergic to amoxicillin just a derivative of augmentin    Lisinopril Other (See Comments)     Fluid around heart    Losartan Other (See Comments)     High potassium    Shellfish containing products Anaphylaxis     Pt.states she is allergic to SEAFOOD since the age of 13    Levaquin [levofloxacin] Other (See Comments)     Very bad joint pain    Clindamycin Palpitations     Chest pain       No current facility-administered medications on file prior to encounter.     Current Outpatient Medications on File Prior to Encounter   Medication Sig    acetaminophen (TYLENOL) 500 MG tablet Take 2 tablets (1,000 mg total) by mouth every 8 (eight) hours as needed for Pain.    albuterol (PROVENTIL/VENTOLIN HFA) 90 mcg/actuation inhaler Inhale 2 puffs into the lungs every 6 (six) hours as needed for Wheezing. Rescue    ALPRAZolam (XANAX) 0.5 MG tablet Take 1 tablet (0.5 mg total) by mouth nightly as needed for Anxiety.    aspirin (ECOTRIN) 81 MG EC tablet Take 1 tablet (81 mg total) by mouth once daily.    carvediloL (COREG) 3.125 MG tablet Take 1 tablet (3.125 mg total) by mouth 2 (two) times daily with meals.    clopidogreL (PLAVIX) 75 mg tablet Take 1 tablet (75 mg total) by mouth once daily.    docusate sodium (COLACE) 100 MG capsule Take 1 capsule (100 mg total) by mouth 2 (two) times daily.    esomeprazole (NEXIUM) 40 MG capsule Take 1 capsule (40 mg total) by mouth before breakfast.    ezetimibe (ZETIA) 10 mg tablet Take 1 tablet (10 mg total) by mouth every evening.    fluticasone propionate (FLONASE) 50 mcg/actuation nasal spray 1 spray (50 mcg total) by Each Nostril route once daily.    furosemide (LASIX) 20 MG tablet Take 1 tablet (20 mg total) by mouth once daily.    ondansetron (ZOFRAN-ODT) 8 MG TbDL Take 1  tablet (8 mg total) by mouth every 12 (twelve) hours as needed (nausea).    polyethylene glycol (GLYCOLAX) 17 gram/dose powder Take 17 g by mouth once daily.    simethicone (MYLICON) 80 MG chewable tablet Take 1 tablet (80 mg total) by mouth every 8 (eight) hours as needed for Flatulence.    spironolactone (ALDACTONE) 25 MG tablet Take half a tablet (12.5 mg total) by mouth once daily.    traMADoL (ULTRAM) 50 mg tablet Take 1 tablet (50 mg total) by mouth every 8 (eight) hours as needed for Pain.    collagenase (SANTYL) ointment Apply topically once daily.    nitroGLYCERIN (NITROSTAT) 0.4 MG SL tablet Place 1 tablet (0.4 mg total) under the tongue every 5 (five) minutes as needed for Chest pain.    [DISCONTINUED] metoprolol succinate (TOPROL-XL) 25 MG 24 hr tablet Take 1 tablet (25 mg total) by mouth once daily.    [DISCONTINUED] pantoprazole (PROTONIX) 40 MG tablet Take 1 tablet (40 mg total) by mouth once daily.    [DISCONTINUED] prasugreL (EFFIENT) 10 mg Tab Take 1 tablet (10 mg total) by mouth once daily.     Family History       Problem Relation (Age of Onset)    Emphysema Father    Heart attack Brother    Heart disease Mother          Tobacco Use    Smoking status: Former Smoker     Packs/day: 0.50     Start date: 1976     Quit date: 2012     Years since quittin.6    Smokeless tobacco: Never Used   Substance and Sexual Activity    Alcohol use: No    Drug use: No    Sexual activity: Not on file     Review of Systems   Constitutional:  Positive for appetite change and fatigue. Negative for activity change, chills and fever.   HENT:  Negative for congestion and trouble swallowing.    Eyes:  Negative for photophobia and visual disturbance.   Respiratory:  Positive for shortness of breath. Negative for cough and wheezing.    Cardiovascular:  Negative for chest pain, palpitations and leg swelling.   Gastrointestinal:  Positive for abdominal pain, anal bleeding, nausea and vomiting. Negative for  diarrhea.   Endocrine: Negative for polydipsia and polyuria.   Genitourinary:  Positive for vaginal bleeding. Negative for dysuria and frequency.   Musculoskeletal:  Negative for arthralgias and gait problem.   Skin:  Positive for wound (Sacral and abdominal). Negative for rash.   Neurological:  Positive for weakness. Negative for dizziness, speech difficulty and headaches. Tremors: Generalized.  Psychiatric/Behavioral:  Negative for confusion and sleep disturbance.    Objective:     Vital Signs (Most Recent):  Temp: 97.4 °F (36.3 °C) (07/18/22 2006)  Pulse: 81 (07/18/22 2006)  Resp: 16 (07/18/22 2006)  BP: (!) 107/57 (07/18/22 2006)  SpO2: 97 % (07/18/22 2006)   Vital Signs (24h Range):  Temp:  [96.6 °F (35.9 °C)-97.8 °F (36.6 °C)] 97.4 °F (36.3 °C)  Pulse:  [71-81] 81  Resp:  [16-26] 16  SpO2:  [97 %-100 %] 97 %  BP: ()/(50-63) 107/57     Weight: 51.3 kg (113 lb)  Body mass index is 20.02 kg/m².    Physical Exam  Constitutional:       General: She is not in acute distress.     Appearance: She is not ill-appearing.   HENT:      Head: Normocephalic and atraumatic.      Nose: No congestion.      Mouth/Throat:      Mouth: Mucous membranes are moist.      Pharynx: Oropharynx is clear.   Eyes:      General:         Right eye: No discharge.         Left eye: No discharge.      Extraocular Movements: Extraocular movements intact.      Pupils: Pupils are equal, round, and reactive to light.   Cardiovascular:      Rate and Rhythm: Normal rate and regular rhythm.      Heart sounds: Normal heart sounds. No murmur heard.  Pulmonary:      Effort: Pulmonary effort is normal. No respiratory distress.      Breath sounds: Normal breath sounds. No wheezing or rhonchi.   Abdominal:      General: There is no distension.      Palpations: Abdomen is soft.      Tenderness: There is abdominal tenderness. There is no guarding.      Comments: Colostomy/decreased bowel sounds   Musculoskeletal:         General: No swelling or  tenderness. Normal range of motion.      Cervical back: Normal range of motion. No rigidity or tenderness.   Skin:     General: Skin is warm.      Findings: No lesion or rash.   Neurological:      General: No focal deficit present.      Mental Status: She is alert and oriented to person, place, and time.      Sensory: No sensory deficit.      Gait: Gait normal.   Psychiatric:         Mood and Affect: Mood normal.         Behavior: Behavior normal.         CRANIAL NERVES     CN III, IV, VI   Pupils are equal, round, and reactive to light.     Significant Labs: All pertinent labs within the past 24 hours have been reviewed.  A1C:   Recent Labs   Lab 05/14/22  0850 05/24/22  0018 06/08/22  0107   HGBA1C 4.9 5.0 4.9     Blood Culture: No results for input(s): LABBLOO in the last 48 hours.  BMP:   Recent Labs   Lab 07/18/22  1300      *   K 4.1   CL 91*   CO2 27   BUN 12   CREATININE 0.7   CALCIUM 8.4*   MG 1.7     CBC:   Recent Labs   Lab 07/18/22  1300   WBC 3.65*   HGB 10.2*   HCT 31.4*        CMP:   Recent Labs   Lab 07/18/22  1300   *   K 4.1   CL 91*   CO2 27      BUN 12   CREATININE 0.7   CALCIUM 8.4*   PROT 6.7   ALBUMIN 1.8*   BILITOT 1.5*   ALKPHOS 94   AST 45*   ALT 22   ANIONGAP 10   EGFRNONAA >60     Lactic Acid:   Recent Labs   Lab 07/18/22  1300 07/18/22  1858   LACTATE 1.6 1.4     Lipase:   Recent Labs   Lab 07/18/22  1300   LIPASE 5     Magnesium:   Recent Labs   Lab 07/18/22  1300   MG 1.7     TSH:   Recent Labs   Lab 03/15/22  1254   TSH 1.553     Urine Culture: No results for input(s): LABURIN in the last 48 hours.  Urine Studies:   Recent Labs   Lab 07/18/22  1322   COLORU Yellow   APPEARANCEUA Clear   PHUR 6.0   SPECGRAV <=1.005*   PROTEINUA Negative   GLUCUA Negative   KETONESU Negative   BILIRUBINUA Negative   OCCULTUA 2+*   NITRITE Negative   UROBILINOGEN 1.0   LEUKOCYTESUR 1+*   RBCUA 5*   WBCUA 3       Significant Imaging: I have reviewed all pertinent imaging  results/findings within the past 24 hours.  CT Abdomen Pelvis With Contrast  Narrative: EXAMINATION:  CT ABDOMEN PELVIS WITH CONTRAST    CLINICAL HISTORY:  Abdominal abscess/infection suspected;    TECHNIQUE:  Low dose axial images, sagittal and coronal reformations were obtained from the lung bases to the pubic symphysis following the IV administration of 75 mL of Omnipaque 350.    COMPARISON:  06/06/2022    FINDINGS:  The lung bases show small pleural effusions, right greater than left, similar compared to prior.  Persistent reticular opacities at the lung bases which can be seen with mild edema or underlying interstitial lung disease.    The liver is homogeneous.  Gallbladder is unremarkable.  Spleen and pancreas are unremarkable.    Adrenal glands are normal.  Kidneys concentrate contrast appropriately.  The urinary bladder is unremarkable.    Ectasia of the infrarenal abdominal aorta measuring 2 cm.  Atherosclerosis.  No retroperitoneal or mesenteric lymph node enlargement seen.    Stomach and loops of bowel are normal caliber.  Duodenal diverticulum.  Left upper quadrant colostomy with postoperative changes at the level of the rectum.  Mild edema and trace free fluid in the pelvis, similar compared to prior.    Regional skeleton shows degenerative change L5-S1.  Impression: Interval development of a collection of air and fluid along the midline incision anterior abdominal wall measuring 2.4 x 1 cm suspicious for a small developing abscess.  This is too small for percutaneous drain at this time.    Prior partial colectomy with left upper quadrant colostomy.  Stable edema and trace fluid in the pelvis with no focal fluid collection seen.    Small pleural effusions, right greater than left.    Electronically signed by: Layla Verdugo  Date:    07/18/2022  Time:    15:02  X-Ray Chest AP Portable  Narrative: EXAMINATION:  XR CHEST AP PORTABLE    CLINICAL HISTORY:  Sepsis;    TECHNIQUE:  Single frontal view of the  chest was performed.    COMPARISON:  06/08/2022    FINDINGS:  Lungs are well expanded.  No acute consolidation, pleural effusion, or pneumothorax.  Reticular opacities in both lungs unchanged.    Cardiac silhouette is mildly enlarged, stable.  Left chest wall pacing device with leads in stable position  Impression: Stable chest with no acute consolidation.  Reticular opacities in both lungs unchanged.  These can be seen with edema in the acute setting or interstitial lung disease in the chronic setting.    Electronically signed by: Layla Verdugo  Date:    07/18/2022  Time:    13:29

## 2022-07-19 NOTE — PROGRESS NOTES
[facility] Surg [facility])  Wound Care    Patient Name:  Odette Hart   MRN:  [id number]  Date: 2022  Diagnosis: History of diverticulitis    History:     Past Medical History:   Diagnosis Date    Acute coronary syndrome     Acute exacerbation of chronic obstructive pulmonary disease (COPD) 3/23/2022    Allergy     Arthritis     Cardiomyopathy     CHF (congestive heart failure)     Coronary artery disease     Coronary artery disease of native artery of native heart with stable angina pectoris 2021: [facility]: Cath: LAD: Proximal stent patent. LCX: Proximal 80%. LCX: Dominant. Moderate disease. LCX: HEVER 2.75 x 18 mm. Distal dissection. HEVER 2.75 x 22 mm.     Diverticulitis     Diverticulosis     Familial hypercholesterolemia 2022    Former smoker 2022    Heart attack     Heart disease     History of myocardial infarction 2022    Hyperlipidemia     Hypertension     Hypertension 2022    ICD (implantable cardioverter-defibrillator) in place     Non-ST elevation myocardial infarction (NSTEMI) 2019       Social History     Socioeconomic History    Marital status:    Tobacco Use    Smoking status: Former Smoker     Packs/day: 0.50     Start date: 1976     Quit date: 2012     Years since quittin.6    Smokeless tobacco: Never Used   Substance and Sexual Activity    Alcohol use: No    Drug use: No     Social Determinants of Health     Financial Resource Strain: Low Risk     Difficulty of Paying Living Expenses: Not hard at all   Food Insecurity: No Food Insecurity    Worried About Running Out of Food in the Last Year: Never true    Ran Out of Food in the Last Year: Never true   Transportation Needs: No Transportation Needs    Lack of Transportation (Medical): No    Lack of Transportation (Non-Medical): No   Physical Activity: Inactive    Days of Exercise per Week: 0 days    Minutes of Exercise per Session: 0 min   Stress:  "No Stress Concern Present    Feeling of Stress : Not at all   Social Connections: Socially Isolated    Frequency of Communication with Friends and Family: More than three times a week    Frequency of Social Gatherings with Friends and Family: Once a week    Attends Mor[employer] Services: Never    Active Member of Clubs or Organizations: No    Attends Club or Organization Meetings: Never    Marital Status:    Housing Stability: Low Risk     Unable to Pay for Housing in the Last Year: No    Number of Places Lived in the Last Year: 1    Unstable Housing in the Last Year: No       Precautions:     Allergies as of 07/18/2022 - Reviewed 07/18/2022   Allergen Reaction Noted    Augmentin [amoxicillin-pot clavulanate] Swelling 02/19/2018    Lisinopril Other (See Comments) 02/19/2018    Losartan Other (See Comments) 02/19/2018    Shellfish containing products Anaphylaxis 09/10/2018    Levaquin [levofloxacin] Other (See Comments) 09/18/2018    Clindamycin Palpitations 01/21/2019       [facility] Assessment Details/Treatment     Consulted for sacral wound.  Patient well known to wound care from previous admissions  She has a colostomy, non healing midline abdominal wound and an resolving stage 4 pressure injury to the sacrum.  Colostomy - pouch was leaking and new appliance placed. Stoma assessed as a 35mm pink oval with clear peristomal skin. New pouch applied using a barrier ring.  Midline non healing surgical wound- wound has been chronic with yellow base and today presents with a loose mass of knotted clear suture in wound base. Pt states she is "reluctant to return to [facility] as she was not treated well on their med-surg unit. Surgeon told her it would take a long time to heal and she was referred to outpatient wound care in [facility]".  Admitting MD has asked Dr Krishnamurthy to evaluate. Unsure when she can have ostomy reversal.  Sacral wound- Full thickness stage 4 pressure injury acquired at [facility] after " ostomy surgery, tissue pink with yellow fibrinous exudate and slough at edges. Used santyl packing during last admission and will continue same . Will cover with topical sacral foam dressing.According to the nursing staff, she has refused adding a waffle mattress to her bed . Discussed needing to offload her sacral wound and her complaints of being uncomfortable in any position. She is now agreeable to adding the support surface.  Recommendations: begin Santyl packing to abdominal and sacral wound daily. Frequent repositioning, waffle mattress, routine colostomy care.  Orders written for care.     07/19/22 1000        Incision/Site 05/09/22 1135 Abdomen   Date First Assessed/Time First Assessed: 05/09/22 1135   Present Prior to Hospital Arrival?: Yes  Location: Abdomen   Wound Image     Incision WDL ex   Dressing Appearance Moist drainage;Intact   Drainage Amount Small   Drainage Characteristics/Odor Tan;No odor   Appearance Yellow;Not granulating;Other (see comments)  (large mass of clear suture in wound base.)   Yellow (%), Wound Tissue Color 100 %   Periwound Area Intact   Wound Edges Open   Wound Length (cm) 2 cm   Wound Width (cm) 1.5 cm   Wound Depth (cm) 2 cm   Wound Volume (cm^3) 6 cm^3   Wound Surface Area (cm^2) 3 cm^2   Undermining (depth (cm)/location) undermines 2cm at 6 o'clock,1 at 9 o'clockand 1.5 at 3o'clock   Care Cleansed with:;Wound cleanser   Dressing Applied;Silver;Hydrofiber;Silicone;Foam        Altered Skin Integrity 05/09/22 1630 Sacral spine Full thickness tissue loss. Base is covered by slough and/or eschar in the wound bed   Date First Assessed/Time First Assessed: 05/09/22 1630   Altered Skin Integrity Present on Admission: yes  Location: Sacral spine  Description of Altered Skin Integrity: Full thickness tissue loss. Base is covered by slough and/or eschar in the wound bed   Wound Image     Description of Altered Skin Integrity Full thickness tissue loss. Subcutaneous fat may be visible  but bone, tendon or muscle are not exposed   Dressing Appearance Clean;Intact   Drainage Amount Scant   Drainage Characteristics/Odor Serous;No odor   Appearance Pink;Yellow;Moist;Not granulating;Muscle   Tissue loss description Full thickness   Red (%), Wound Tissue Color 50 %   Yellow (%), Wound Tissue Color 50 %   Periwound Area Redness   Wound Edges Open   Wound Length (cm) 1.6 cm   Wound Width (cm) 1 cm   Wound Depth (cm) 1.5 cm   Wound Volume (cm^3) 2.4 cm^3   Wound Surface Area (cm^2) 1.6 cm^2   Undermining (depth (cm)/location) up to 2cm from 9-12 o'clock   Care Cleansed with:;Wound cleanser   Dressing Applied;Silver;Hydrofiber;Silicone;Foam      07/19/22 1112        Colostomy 05/09/22 1100 Descending/sigmoid LLQ   Placement Date/Time: 05/09/22 1100   Inserted by: MD  Colostomy Type: Descending/sigmoid  Location: LLQ   Stomal Appliance 1 piece;Leakage;Changed   Stoma Appearance pink;oval;protruding above skin level;moist   Stoma Size (in) 35mm oval   Site Assessment Pink;Oval;Moist;Protruding above skin level   Peristomal Assessment Intact   Accessories/Skin Care cleansed w/ water;skin barrier ring   Stoma Function brown;mushy;stool;flatus   Treatment Bag change   Output (mL) 200 mL     07/19/2022

## 2022-07-19 NOTE — SUBJECTIVE & OBJECTIVE
Interval History: chronic abdominal pain with open abdominal and sacral wounds. She endorses prior augmentin reaction. A foreign body (unclear if suture material or other object) was removed from the wound.    Review of Systems   Constitutional:  Positive for chills, fatigue and fever.   HENT:  Positive for congestion. Negative for ear pain.    Respiratory:  Negative for shortness of breath.    Cardiovascular:  Negative for chest pain.   Musculoskeletal:  Negative for back pain.   Skin:  Positive for wound.   Objective:     Vital Signs (Most Recent):  Temp: 98 °F (36.7 °C) (07/19/22 1144)  Pulse: 70 (07/19/22 1144)  Resp: 18 (07/19/22 1144)  BP: (!) 98/50 (07/19/22 1144)  SpO2: 96 % (07/19/22 1144)   Vital Signs (24h Range):  Temp:  [97.4 °F (36.3 °C)-98 °F (36.7 °C)] 98 °F (36.7 °C)  Pulse:  [64-88] 70  Resp:  [16-21] 18  SpO2:  [96 %-100 %] 96 %  BP: ()/(50-60) 98/50     Weight: 52.6 kg (115 lb 15.4 oz)  Body mass index is 20.54 kg/m².    Intake/Output Summary (Last 24 hours) at 7/19/2022 1409  Last data filed at 7/19/2022 1112  Gross per 24 hour   Intake 300 ml   Output 950 ml   Net -650 ml      Physical Exam  Vitals reviewed.   Constitutional:       General: She is not in acute distress.     Comments: Chronically ill appearing   HENT:      Head: Normocephalic.   Eyes:      Pupils: Pupils are equal, round, and reactive to light.   Cardiovascular:      Rate and Rhythm: Normal rate and regular rhythm.   Pulmonary:      Breath sounds: No wheezing or rales.   Abdominal:      Comments: Bowel sounds present. Chronic abdominal wound with fibrinous material adjacent to the wound.    Musculoskeletal:         General: No swelling. Normal range of motion.      Cervical back: Neck supple.   Skin:     General: Skin is warm.      Comments: Sacral wound through to fat, noted on admission.    Neurological:      General: No focal deficit present.      Mental Status: She is alert and oriented to person, place, and time.        Significant Labs: All pertinent labs within the past 24 hours have been reviewed.    Significant Imaging: I have reviewed all pertinent imaging results/findings within the past 24 hours.

## 2022-07-19 NOTE — PLAN OF CARE
ALECIA spoke with patient about her experience at the [facility].  She stated she still goes there 4 times a week for her wound care.  She also stated that Cristina a hospital employee in the wound care clinic said that she can come back there after her hospital discharge but the doctor here will need to put another order in for her to go back.    ALECIA will continue to follow.

## 2022-07-19 NOTE — CONSULTS
[facility] - Med Surg ([facility])  Adult Nutrition  Consult Note    SUMMARY     Recommendations  1. Continue clear liquid diet with FR per MD orders.    -when medically able advance to cardiac diet : consider adding [employer] Peptide 1.5 TID.   2.Add Ezra BID mix with 8 fl oz of fluid of choice to promote wound healing.   3.RD to monitor diet advancement, tolerance, wt changes, and labs.    Goals: Diet to advance by RD f/u.  Nutrition Goal Status: new  Communication of RD Recs:  (POC)    Assessment and Plan    Severe protein-calorie malnutrition  Nutrition Problem:  Severe Protein-Calorie Malnutrition  Malnutrition in the context of Chronic Illness/Injury    Related to (etiology):  Altered GI function/ poor appetite / chronic wounds    Signs and Symptoms (as evidenced by):  Energy Intake: <75% of estimated energy requirement for 1 month  Body Fat Depletion: severe depletion of orbitals and thoracic and lumbar region   Muscle Mass Depletion: severe depletion of temples and scapular region     Interventions(treatment strategy):  Collaboration with other healthcare providers  Clear liquid- cardiac diet  ONS    Nutrition Diagnosis Status:  New    Malnutrition Assessment  Malnutrition Type: chronic illness  Energy Intake: severe energy intake  Energy Intake (Malnutrition): less than or equal to 75% for greater than or equal to 1 month   Orbital Region (Subcutaneous Fat Loss): severe depletion  Upper Arm Region (Subcutaneous Fat Loss): mild depletion  Thoracic and Lumbar Region: severe depletion   Voodoo Region (Muscle Loss): severe depletion  Clavicle Bone Region (Muscle Loss): mild depletion  Clavicle and Acromion Bone Region (Muscle Loss): mild depletion  Scapular Bone Region (Muscle Loss): severe depletion  Dorsal Hand (Muscle Loss): mild depletion       Subcutaneous Fat Loss (Final Summary): severe protein-calorie malnutrition  Muscle Loss Evaluation (Final Summary): severe protein-calorie malnutrition      "    Reason for Assessment    Reason For Assessment: consult  Diagnosis:  (hx of diverticultiis)  Relevant Medical History:   Past Medical History:   Diagnosis Date    Acute coronary syndrome     Acute exacerbation of chronic obstructive pulmonary disease (COPD) 3/23/2022    Allergy     Arthritis     Cardiomyopathy     CHF (congestive heart failure)     Coronary artery disease     Coronary artery disease of native artery of native heart with stable angina pectoris 4/19/2021 1/24/2022: [facility]: Cath: LAD: Proximal stent patent. LCX: Proximal 80%. LCX: Dominant. Moderate disease. LCX: HEVER 2.75 x 18 mm. Distal dissection. HEVER 2.75 x 22 mm.     Diverticulitis     Diverticulosis     Familial hypercholesterolemia 1/22/2022    Former smoker 1/24/2022    Heart attack     Heart disease     History of myocardial infarction 1/24/2022    Hyperlipidemia     Hypertension     Hypertension 1/24/2022    ICD (implantable cardioverter-defibrillator) in place     Non-ST elevation myocardial infarction (NSTEMI) 2/4/2019       Interdisciplinary Rounds: did not attend  General Information Comments: 7/19-RD consulted for malnutrition. RD familiar with patient. Multiple recent visits. Pt complaints of nausea this a.m. Does not seem to be eating small/ frequent meals at home. Does still consume small amounts of food- has eaten hamburger/ fried fish/ watermelon. Vomiting X 1 week. Wt stable. NFPE performed- meets criteria for severe malnutrtion in the context of chronic illness/ injury.    Nutrition Discharge Planning: Pending medical couse- cardiac diet with ONS as tolerated    Nutrition Risk Screen    Nutrition Risk Screen: no indicators present    Nutrition/Diet History    Factors Affecting Nutritional Intake: decreased appetite, altered gastrointestinal function, nausea/vomiting    Anthropometrics    Temp: 98 °F (36.7 °C)  Height Method: Stated  Height: 5' 3" (160 cm)  Height (inches): 63 in  Weight Method: Bed " Scale  Weight: 52.6 kg (115 lb 15.4 oz)  Weight (lb): 115.96 lb  Ideal Body Weight (IBW), Female: 115 lb  % Ideal Body Weight, Female (lb): 98.26 %  BMI (Calculated): 20.5  BMI Grade: 18.5-24.9 - normal       Lab/Procedures/Meds    Pertinent Labs Reviewed: reviewed  Pertinent Labs Comments: Na 131, H/H 10.0/29.9, BNP 4567, Troponin I 0.031  Pertinent Medications Reviewed: reviewed  Pertinent Medications Comments: carvedilol, ceftriaxone, clopdiogrel, docusate Na, metronidazole, pantoprazole, vanc    Estimated/Assessed Needs    Weight Used For Calorie Calculations: 52.6 kg (115 lb 15.4 oz)  Energy Calorie Requirements (kcal): 2489-9949 kcal day (23-35 kcal/kg)  Energy Need Method: Kcal/kg  Protein Requirements: 63-79 g day (1.2-1.5 g/kg wounds)  Weight Used For Protein Calculations: 52.6 kg (115 lb 15.4 oz)  Fluid Requirements (mL): per MD ordrs  Estimated Fluid Requirement Method: other (see comments) (CHF)  RDA Method (mL): 1300  CHO Requirement: 163 g day    Nutrition Prescription Ordered    Current Diet Order: clear liquid; FR 1000mL    Evaluation of Received Nutrient/Fluid Intake    I/O: -0.65L since admit  Energy Calories Required: not meeting needs  Protein Required: not meeting needs  Fluid Required:  (per MD orders)  % Intake of Estimated Energy Needs: 0 - 25 %  % Meal Intake: 0 - 25 %    Nutrition Risk    Level of Risk/Frequency of Follow-up:  (2x weekly)     Monitor and Evaluation    Food and Nutrient Intake: energy intake, food and beverage intake  Food and Nutrient Adminstration: diet order  Knowledge/Beliefs/Attitudes: food and nutrition knowledge/skill  Physical Activity and Function: nutrition-related ADLs and IADLs  Anthropometric Measurements: weight, weight change, body mass index  Biochemical Data, Medical Tests and Procedures: glucose/endocrine profile, gastrointestinal profile, electrolyte and renal panel, inflammatory profile, lipid profile  Nutrition-Focused Physical Findings: overall  appearance     Nutrition Follow-Up    RD Follow-up?: Yes

## 2022-07-19 NOTE — ASSESSMENT & PLAN NOTE
Pt is asymptomatic. Pt has been having decreased sodium since May. 128 is her lowest.   Likely hypovolemic hyponatremia due to decreased intake.  Uric acid and urine sodium pending. Restrict free water.  Will conservatively manage due to her elevated BNP.

## 2022-07-20 PROBLEM — L02.211 ABDOMINAL WALL ABSCESS: Status: ACTIVE | Noted: 2022-01-01

## 2022-07-20 PROBLEM — T81.49XA WOUND INFECTION AFTER SURGERY: Status: ACTIVE | Noted: 2022-01-01

## 2022-07-20 NOTE — SUBJECTIVE & OBJECTIVE
Past Medical History:   Diagnosis Date    Acute coronary syndrome     Acute exacerbation of chronic obstructive pulmonary disease (COPD) 3/23/2022    Allergy     Arthritis     Cardiomyopathy     CHF (congestive heart failure)     Coronary artery disease     Coronary artery disease of native artery of native heart with stable angina pectoris 2021: [facility]: Cath: LAD: Proximal stent patent. LCX: Proximal 80%. LCX: Dominant. Moderate disease. LCX: HEVER 2.75 x 18 mm. Distal dissection. HEVER 2.75 x 22 mm.     Diverticulitis     Diverticulosis     Familial hypercholesterolemia 2022    Former smoker 2022    Heart attack     Heart disease     History of myocardial infarction 2022    Hyperlipidemia     Hypertension     Hypertension 2022    ICD (implantable cardioverter-defibrillator) in place     Non-ST elevation myocardial infarction (NSTEMI) 2019       Past Surgical History:   Procedure Laterality Date    APPENDECTOMY N/A 2022    Procedure: APPENDECTOMY;  Surgeon: Rafa Cardozo MD;  Location: [facility] 2ND FLR;  Service: Colon and Rectal;  Laterality: N/A;    ATRIAL CARDIAC PACEMAKER INSERTION      CECECTOMY N/A 2022    Procedure: EXCISION, CECUM;  Surgeon: Rafa Cardozo MD;  Location: [facility]ND FLR;  Service: Colon and Rectal;  Laterality: N/A;     SECTION      CHOLECYSTECTOMY N/A 2022    Procedure: CHOLECYSTECTOMY;  Surgeon: Doug Epstein MD;  Location: [facility]LR;  Service: General;  Laterality: N/A;    COLONOSCOPY N/A 2022    Procedure: COLONOSCOPY;  Surgeon: Rafa Cardozo MD;  Location: [facility];  Service: Colon and Rectal;  Laterality: N/A;    COLOSTOMY  2022    Procedure: CREATION, COLOSTOMY;  Surgeon: Rafa Cardozo MD;  Location: [facility] FLR;  Service: Colon and Rectal;;    CORONARY STENT PLACEMENT      LEFT HEART CATHETERIZATION Left 2022    Procedure: CATHETERIZATION, HEART, LEFT;  Surgeon: Yohannes  MD Tasha;  Location: [facility] CATH LAB;  Service: Cardiology;  Laterality: Left;       Review of patient's allergies indicates:   Allergen Reactions    Augmentin [amoxicillin-pot clavulanate] Swelling     Not allergic to amoxicillin just a derivative of augmentin    Lisinopril Other (See Comments)     Fluid around heart    Losartan Other (See Comments)     High potassium    Shellfish containing products Anaphylaxis     Pt.states she is allergic to SEAFOOD since the age of 13    Levaquin [levofloxacin] Other (See Comments)     Very bad joint pain    Clindamycin Palpitations     Chest pain       Medications:  Medications Prior to Admission   Medication Sig    acetaminophen (TYLENOL) 500 MG tablet Take 2 tablets (1,000 mg total) by mouth every 8 (eight) hours as needed for Pain.    albuterol (PROVENTIL/VENTOLIN HFA) 90 mcg/actuation inhaler Inhale 2 puffs into the lungs every 6 (six) hours as needed for Wheezing. Rescue    ALPRAZolam (XANAX) 0.5 MG tablet Take 1 tablet (0.5 mg total) by mouth nightly as needed for Anxiety.    aspirin (ECOTRIN) 81 MG EC tablet Take 1 tablet (81 mg total) by mouth once daily.    carvediloL (COREG) 3.125 MG tablet Take 1 tablet (3.125 mg total) by mouth 2 (two) times daily with meals.    clopidogreL (PLAVIX) 75 mg tablet Take 1 tablet (75 mg total) by mouth once daily.    collagenase (SANTYL) ointment Apply topically once daily.    docusate sodium (COLACE) 100 MG capsule Take 1 capsule (100 mg total) by mouth 2 (two) times daily.    esomeprazole (NEXIUM) 40 MG capsule Take 1 capsule (40 mg total) by mouth before breakfast.    ezetimibe (ZETIA) 10 mg tablet Take 1 tablet (10 mg total) by mouth every evening.    fluticasone propionate (FLONASE) 50 mcg/actuation nasal spray 1 spray (50 mcg total) by Each Nostril route once daily.    furosemide (LASIX) 20 MG tablet Take 1 tablet (20 mg total) by mouth once daily.    ondansetron (ZOFRAN-ODT) 8 MG TbDL Take 1 tablet (8 mg total) by mouth every  "12 (twelve) hours as needed (nausea).    spironolactone (ALDACTONE) 25 MG tablet Take half a tablet (12.5 mg total) by mouth once daily.    traMADoL (ULTRAM) 50 mg tablet Take 1 tablet (50 mg total) by mouth every 8 (eight) hours as needed for Pain.    nitroGLYCERIN (NITROSTAT) 0.4 MG SL tablet Place 1 tablet (0.4 mg total) under the tongue every 5 (five) minutes as needed for Chest pain.    polyethylene glycol (GLYCOLAX) 17 gram/dose powder Take 17 g by mouth once daily.    simethicone (MYLICON) 80 MG chewable tablet Take 1 tablet (80 mg total) by mouth every 8 (eight) hours as needed for Flatulence.     Antibiotics (From admission, onward)                Start     Stop Route Frequency Ordered    07/19/22 2300  vancomycin 750 mg in dextrose 5 % 250 mL IVPB (ready to mix system)        "Followed by" Linked Group Details    -- IV Every 12 hours (non-standard times) 07/19/22 1006    07/19/22 1100  cefTRIAXone (ROCEPHIN) 2 g/50 mL D5W IVPB         -- IV Every 24 hours (non-standard times) 07/19/22 0947    07/19/22 1100  metronidazole IVPB 500 mg         -- IV Every 8 hours (non-standard times) 07/19/22 0947    07/19/22 1046  vancomycin - pharmacy to dose  (vancomycin IVPB)        "And" Linked Group Details    -- IV pharmacy to manage frequency 07/19/22 0947          Antifungals (From admission, onward)                None          Antivirals (From admission, onward)      None             Immunization History   Administered Date(s) Administered    COVID-19, MRNA, LN-S, PF (MODERNA FULL 0.5 ML DOSE) 08/10/2021    Influenza (FLUBLOK) - Quadrivalent - Recombinant - PF *Preferred* (egg allergy) 10/27/2020    PPD Test 05/18/2022    Td (Adult), Unspecified Formulation 09/28/2005    Td - PF (ADULT) 01/26/2021       Family History       Problem Relation (Age of Onset)    Emphysema Father    Heart attack Brother    Heart disease Mother          Social History     Socioeconomic History    Marital status:    Tobacco Use    " Smoking status: Former Smoker     Packs/day: 0.50     Start date: 1976     Quit date: 2012     Years since quittin.6    Smokeless tobacco: Never Used   Substance and Sexual Activity    Alcohol use: No    Drug use: No     Social Determinants of Health     Financial Resource Strain: Low Risk     Difficulty of Paying Living Expenses: Not hard at all   Food Insecurity: No Food Insecurity    Worried About Running Out of Food in the Last Year: Never true    Ran Out of Food in the Last Year: Never true   Transportation Needs: No Transportation Needs    Lack of Transportation (Medical): No    Lack of Transportation (Non-Medical): No   Physical Activity: Inactive    Days of Exercise per Week: 0 days    Minutes of Exercise per Session: 0 min   Stress: No Stress Concern Present    Feeling of Stress : Not at all   Social Connections: Socially Isolated    Frequency of Communication with Friends and Family: More than three times a week    Frequency of Social Gatherings with Friends and Family: Once a week    Attends [employer] Services: Never    Active Member of Clubs or Organizations: No    Attends Club or Organization Meetings: Never    Marital Status:    Housing Stability: Low Risk     Unable to Pay for Housing in the Last Year: No    Number of Places Lived in the Last Year: 1    Unstable Housing in the Last Year: No     Review of Systems   Constitutional:  Positive for activity change, appetite change, fatigue and fever.   Gastrointestinal:  Positive for nausea and vomiting.   All other systems reviewed and are negative.  Objective:     Vital Signs (Most Recent):  Temp: 97.5 °F (36.4 °C) (22 07)  Pulse: 70 (22 0800)  Resp: 18 (22 07)  BP: (!) 93/44 (22 07)  SpO2: 97 % (22)   Vital Signs (24h Range):  Temp:  [97.5 °F (36.4 °C)-98.1 °F (36.7 °C)] 97.5 °F (36.4 °C)  Pulse:  [59-89] 70  Resp:  [14-18] 18  SpO2:  [96 %-99 %] 97 %  BP: ()/(44-58) 93/44     Weight:  52.6 kg (115 lb 15.4 oz)  Body mass index is 20.54 kg/m².    Estimated Creatinine Clearance: 77.3 mL/min (based on SCr of 0.6 mg/dL).    Physical Exam  Vitals and nursing note reviewed.   Constitutional:       General: She is not in acute distress.     Appearance: She is not ill-appearing, toxic-appearing or diaphoretic.   HENT:      Head: Normocephalic and atraumatic.      Right Ear: External ear normal.      Left Ear: External ear normal.   Eyes:      Extraocular Movements: Extraocular movements intact.      Conjunctiva/sclera: Conjunctivae normal.      Pupils: Pupils are equal, round, and reactive to light.   Abdominal:      General: Abdomen is flat. Bowel sounds are normal.      Comments: Ostomy; drainage from wound?   Neurological:      General: No focal deficit present.      Mental Status: She is alert and oriented to person, place, and time. Mental status is at baseline.   Psychiatric:         Mood and Affect: Mood normal.         Behavior: Behavior normal.       Significant Labs: Blood Culture:   Recent Labs   Lab 02/25/22  1710 03/15/22  1254 07/18/22  1223   LABBLOO No growth after 5 days.  No growth after 5 days. No growth after 5 days. No Growth to date  No Growth to date  No Growth to date  No Growth to date     CBC:   Recent Labs   Lab 07/18/22  1300 07/19/22  0419 07/20/22  0607   WBC 3.65* 2.50* 1.85*   HGB 10.2* 10.0* 10.5*   HCT 31.4* 29.9* 32.2*    262 285     Wound Culture: No results for input(s): LABAERO in the last 4320 hours.    Significant Imaging: I have reviewed all pertinent imaging results/findings within the past 24 hours.

## 2022-07-20 NOTE — PROGRESS NOTES
House day 3.   Status post exploratory laparotomy with sigmoid colectomy, Landry's pouch, end colostomy, drainage of pelvic abscess,  History of wound infection and dehiscence    Subjective   Patient advanced to low residue diet which she is tolerating without difficulty  Denies fever chills     PE   Afebrile   Vital signs stable   Abdomen -soft, colostomy functioning with significant amounts of stool. ,  Abdominal midline incision with open area 1.5 by 2 cm, no evidence of enteric contents or abscess.  Minimal surrounding cellulitis      Lab   White blood cell count -within normal limits    Imaging  CT- small fluid collection along site of open wound,    Impression /plan  No compelling evidence of surgical issue  I.e. abscess requiring drainage or fistula..Continue local care, antibiotics, serial exam

## 2022-07-20 NOTE — ASSESSMENT & PLAN NOTE
Not in acute exacerbation.  Holding diuresis to not further hyponatremia, as her BNP elevation is along baseline and she is asymptomatic at this time.

## 2022-07-20 NOTE — ASSESSMENT & PLAN NOTE
- patient is neutropenic  - will expand abx coverage to include Pseudomonas  - would drain fluid for culture, if at all possible

## 2022-07-20 NOTE — ASSESSMENT & PLAN NOTE
Abdominal wall fluid collection. Symptoms thought to be from abdominal wall infection    Discussed percutaneous drainage with IR  Continue IV abx  ID following

## 2022-07-20 NOTE — ASSESSMENT & PLAN NOTE
Poorly healing sacral and abdominal wound in setting of chronic illness and malnutrition     The patient is a 82y Female complaining of rapid heart beat.

## 2022-07-20 NOTE — SUBJECTIVE & OBJECTIVE
Interval History: advancing diet today. ID consulted. Discussed fluid aspiration with IR    Review of Systems   Constitutional:  Negative for chills, fatigue and fever.   HENT:  Negative for congestion and ear pain.    Respiratory:  Negative for shortness of breath.    Cardiovascular:  Negative for chest pain.   Musculoskeletal:  Negative for back pain.   Skin:  Positive for wound.   Objective:     Vital Signs (Most Recent):  Temp: 97.9 °F (36.6 °C) (07/20/22 1538)  Pulse: 91 (07/20/22 1538)  Resp: 16 (07/20/22 1538)  BP: (!) 103/58 (07/20/22 1538)  SpO2: 98 % (07/20/22 1538)   Vital Signs (24h Range):  Temp:  [97.5 °F (36.4 °C)-97.9 °F (36.6 °C)] 97.9 °F (36.6 °C)  Pulse:  [59-91] 91  Resp:  [14-18] 16  SpO2:  [97 %-99 %] 98 %  BP: ()/(44-58) 103/58     Weight: 52.6 kg (115 lb 15.4 oz)  Body mass index is 20.54 kg/m².    Intake/Output Summary (Last 24 hours) at 7/20/2022 1626  Last data filed at 7/20/2022 0145  Gross per 24 hour   Intake 997.75 ml   Output 600 ml   Net 397.75 ml        Physical Exam  Vitals reviewed.   Constitutional:       General: She is not in acute distress.     Comments: Chronically ill appearing   HENT:      Head: Normocephalic.   Eyes:      Pupils: Pupils are equal, round, and reactive to light.   Cardiovascular:      Rate and Rhythm: Normal rate and regular rhythm.   Pulmonary:      Breath sounds: No wheezing or rales.   Abdominal:      Comments: Bowel sounds present. Chronic abdominal wound with fibrinous material adjacent to the wound.    Musculoskeletal:         General: No swelling. Normal range of motion.      Cervical back: Neck supple.   Skin:     General: Skin is warm.      Comments: Sacral wound through to fat, noted on admission.    Neurological:      General: No focal deficit present.      Mental Status: She is alert and oriented to person, place, and time.       Significant Labs: All pertinent labs within the past 24 hours have been reviewed.    Significant Imaging: I have  reviewed all pertinent imaging results/findings within the past 24 hours.

## 2022-07-20 NOTE — PROGRESS NOTES
[facility] Medicine  Progress Note    Patient Name: Odette Hart  MRN: [id number]  Patient Class: IP- Inpatient   Admission Date: 7/18/2022  Length of Stay: 2 days  Attending Physician: Brandon Anderson MD  Primary Care Provider: Braxton Barragan MD        Subjective:     Principal Problem:Abdominal wall abscess        HPI:  Ms. Pino is a 66-year-old female with a past medical history that includes adiverticulitis s/p cystoscopy with BL ureteral catheters, open sigmoid colon resection, appendectomy, and creation of end colostomy on 5/9/22.  She also has coronary artery disease with stents and cardiomyopathy, heart failure and AICD placement.  She seems wound care outpatient for sacral wound and a right lower abdomen surgical wound.  Patient came to emergency department today due to pain to the area of skin around her colostomy and weakness.  She reports she has not been eating and drinking for the past week.  She has pain to her stomach with intake of solids or liquids.  Patient also reports that she had the appearance of blood clots come out of her rectum and some blood on her underwear indicating some red blood from her vagina.  Patient does ambulate.  She has a sacral wound that she sees wound care Tuesday through Friday outpatient.  She reports she is not taking medications since last week due to her nausea.  She lives at home by herself with help from her grandson.  General surgery has been consulted due to concern for a possible abscess.  Patient admitted to hospital medicine for further management.      Overview/Hospital Course:  Chronically ill patient with malnutrition and nonhealing open abdominal wound.       Interval History: advancing diet today. ID consulted. Discussed fluid aspiration with IR    Review of Systems   Constitutional:  Negative for chills, fatigue and fever.   HENT:  Negative for congestion and ear pain.    Respiratory:  Negative for shortness of  breath.    Cardiovascular:  Negative for chest pain.   Musculoskeletal:  Negative for back pain.   Skin:  Positive for wound.   Objective:     Vital Signs (Most Recent):  Temp: 97.9 °F (36.6 °C) (07/20/22 1538)  Pulse: 91 (07/20/22 1538)  Resp: 16 (07/20/22 1538)  BP: (!) 103/58 (07/20/22 1538)  SpO2: 98 % (07/20/22 1538)   Vital Signs (24h Range):  Temp:  [97.5 °F (36.4 °C)-97.9 °F (36.6 °C)] 97.9 °F (36.6 °C)  Pulse:  [59-91] 91  Resp:  [14-18] 16  SpO2:  [97 %-99 %] 98 %  BP: ()/(44-58) 103/58     Weight: 52.6 kg (115 lb 15.4 oz)  Body mass index is 20.54 kg/m².    Intake/Output Summary (Last 24 hours) at 7/20/2022 1626  Last data filed at 7/20/2022 0145  Gross per 24 hour   Intake 997.75 ml   Output 600 ml   Net 397.75 ml        Physical Exam  Vitals reviewed.   Constitutional:       General: She is not in acute distress.     Comments: Chronically ill appearing   HENT:      Head: Normocephalic.   Eyes:      Pupils: Pupils are equal, round, and reactive to light.   Cardiovascular:      Rate and Rhythm: Normal rate and regular rhythm.   Pulmonary:      Breath sounds: No wheezing or rales.   Abdominal:      Comments: Bowel sounds present. Chronic abdominal wound with fibrinous material adjacent to the wound.    Musculoskeletal:         General: No swelling. Normal range of motion.      Cervical back: Neck supple.   Skin:     General: Skin is warm.      Comments: Sacral wound through to fat, noted on admission.    Neurological:      General: No focal deficit present.      Mental Status: She is alert and oriented to person, place, and time.       Significant Labs: All pertinent labs within the past 24 hours have been reviewed.    Significant Imaging: I have reviewed all pertinent imaging results/findings within the past 24 hours.      Assessment/Plan:      * Abdominal wall abscess  Abdominal wall fluid collection. Symptoms thought to be from abdominal wall infection    Discussed percutaneous drainage with  IR  Continue IV abx  ID following      Wound infection after surgery        Severe protein-calorie malnutrition  Albumin 1.8. Pt with poor intake. Nutritionist consulted.       COPD (chronic obstructive pulmonary disease)  Not in exacerbation. Unable to locate PFTs. Pt is a former smoker. PRN duo-nebs ordered.      CAD (coronary artery disease)  Continue aspirin  Continue plavix  Continue ezetimibe      Weakness        Hyponatremia  Improving. Pt is asymptomatic.      Alteration in skin integrity in adult  Poorly healing sacral and abdominal wound in setting of chronic illness and malnutrition      Colovesical fistula  Surgical consult      History of diverticulitis  Continue vancomycin  Continue ceftriaxone  Continue flagyl  General surgery consulted           Chronic congestive heart failure  Not in acute exacerbation.  Holding diuresis to not further hyponatremia, as her BNP elevation is along baseline and she is asymptomatic at this time.       VTE Risk Mitigation (From admission, onward)         Ordered     IP VTE HIGH RISK PATIENT  Once         07/18/22 1652     Place sequential compression device  Until discontinued         07/18/22 1652                Discharge Planning   SIVAN:      Code Status: Full Code   Is the patient medically ready for discharge?:     Reason for patient still in hospital (select all that apply): Consult recommendations  Discharge Plan A: Home                  Brandon Anderson MD  Department of Hospital Medicine   [facility])

## 2022-07-20 NOTE — PLAN OF CARE
Back in bed at this time.  Hypotensive on RA and afebrile this shift.  CO Nausea relieved with medications and advanced to + appetite at end of shift.  Anorexic to minimal appetite and adequate UOP this shift, independent with BRP.      Fluid Restriction 1L maintained.    IV access achieved per HBourg, midline education provided (3rd IV in 24 hrs) ABX administration adjusted and re-timed per Pharmacy.    Repositions self independently in bed and ambulating around room Independently.       Free from injury or skin breakdown; Fall precautions maintained and call light in reach.  POC updated questions answered and comments acknowledged.  Purposeful hourly rounding completed this shift.    Wound care / Dr Krishnamurthy / RSIENNA consulted and following    Bld Cx collected on 7/18 pending - NGTD

## 2022-07-20 NOTE — CONSULTS
RD consulted for non- healing wound. RD seen patient on 7/19 documented at 12:45pm. Ezra was added to orders at that time. Will continue to monitor.    Please re-consult as needed.    Thanks!  Meme Madera

## 2022-07-20 NOTE — PLAN OF CARE
Patient AAOX4 VSS plan of care discuss with patient verbalizes understanding purposeful round completed . Dsg to abdominal intact . New order for midline awaiting for midline to be placed . Safety maintain , call bell in reach, bed lock in lowest position .   Problem: Adult Inpatient Plan of Care  Goal: Plan of Care Review  Outcome: Ongoing, Progressing  Goal: Patient-Specific Goal (Individualized)  Outcome: Ongoing, Progressing  Goal: Absence of Hospital-Acquired Illness or Injury  Outcome: Ongoing, Progressing  Goal: Optimal Comfort and Wellbeing  Outcome: Ongoing, Progressing  Goal: Readiness for Transition of Care  Outcome: Ongoing, Progressing     Problem: Impaired Wound Healing  Goal: Optimal Wound Healing  Outcome: Ongoing, Progressing     Problem: Fall Injury Risk  Goal: Absence of Fall and Fall-Related Injury  Outcome: Ongoing, Progressing     Problem: Pain Acute  Goal: Acceptable Pain Control and Functional Ability  Outcome: Ongoing, Progressing     Problem: Nausea and Vomiting  Goal: Fluid and Electrolyte Balance  Outcome: Ongoing, Progressing

## 2022-07-20 NOTE — PROGRESS NOTES
IP Liaison - Initial Visit Note    Patient: Odette Hart  MRN:  [id number]  Date of Service:  7/20/2022  Completed by:  RADHA Madrigal    Reason for Visit   Patient presents with    IP Liaison Initial Visit       RSW met with patient at bedside in order to complete SDOH questionnaire and liaison assessment.  Pt has identified no barriers to care. RSW familiar with patient from previous admissions. RSW to follow up.  The following were addressed during this visit:  - Review SDOH Questions   - Complete patient assessment   - Review and discuss options for social groups or events   - Complete initial visit with patient        Patient Summary     IP Liaison Patient Assessment    General  Level of Caregiver support: Caregiver currently provides assistance  Have you had to make a decision between paying for any of the following in the last 2 months?: None  Transportation means: Family  Employment status: Retired and not working  Current symptoms: Sleep disturbances  Assessments  Was the PHQ Depression Screening completed this visit?: Yes  Was the DANIEL-7 Screening completed this visit?: No         RADHA Madrigal

## 2022-07-20 NOTE — CONSULTS
"[facility] ([facility])  Infectious Disease  Consult Note    Patient Name: Odette Hart  MRN: [id number]  Admission Date: 7/18/2022  Hospital Length of Stay: 2 days  Attending Physician: Brandon Anderson MD  Primary Care Provider: Braxton Barragan MD     Isolation Status: No active isolations    Patient information was obtained from patient, past medical records and ER records.      Inpatient consult to Infectious Diseases  Consult performed by: Lucio Perez MD  Consult ordered by: Brandon Anderson MD        Assessment/Plan:     Wound infection after surgery  - clinically, does not look infected but patient is neutropenic  - will expand abx coverage to include Pseudomonas  - would drain fluid for culture, if at all possible    Thank you for your consult. I will follow-up with patient. Please contact us if you have any additional questions.    Lucio Perez MD  Infectious Disease  [facility] ([facility])    Subjective:     Principal Problem: History of diverticulitis    HPI: 65 yo female with Hx of colovaginal/vesico fistula, abscess, secondary to diverticulitis and now cholecystitis s/p cholecystectomy, appendectomy, fistula repair, and sigmoid colectomy 5/9. Developed wound dehiscense and subjective fever. Was on abx x 1 month. Admitted yesterday. CT imaging reviewed. ID is consulted for "dehiscent abdominal wound."      Past Medical History:   Diagnosis Date    Acute coronary syndrome     Acute exacerbation of chronic obstructive pulmonary disease (COPD) 3/23/2022    Allergy     Arthritis     Cardiomyopathy     CHF (congestive heart failure)     Coronary artery disease     Coronary artery disease of native artery of native heart with stable angina pectoris 4/19/2021 1/24/2022: [facility]: Cath: LAD: Proximal stent patent. LCX: Proximal 80%. LCX: Dominant. Moderate disease. LCX: HEVER 2.75 x 18 mm. Distal dissection. HEVER 2.75 x 22 mm.     Diverticulitis     " Diverticulosis     Familial hypercholesterolemia 2022    Former smoker 2022    Heart attack     Heart disease     History of myocardial infarction 2022    Hyperlipidemia     Hypertension     Hypertension 2022    ICD (implantable cardioverter-defibrillator) in place     Non-ST elevation myocardial infarction (NSTEMI) 2019       Past Surgical History:   Procedure Laterality Date    APPENDECTOMY N/A 2022    Procedure: APPENDECTOMY;  Surgeon: Rafa Cardozo MD;  [facility]: [facility] FLR;  Service: Colon and Rectal;  Laterality: N/A;    ATRIAL CARDIAC PACEMAKER INSERTION      CECECTOMY N/A 2022    Procedure: EXCISION, CECUM;  Surgeon: Rafa Cardozo MD;  Location: [facility] OR [facility] FLR;  Service: Colon and Rectal;  Laterality: N/A;     SECTION      CHOLECYSTECTOMY N/A 2022    Procedure: CHOLECYSTECTOMY;  Surgeon: Doug Epstein MD;  Location: [facility] OR [facility] FLR;  Service: General;  Laterality: N/A;    COLONOSCOPY N/A 2022    Procedure: COLONOSCOPY;  Surgeon: Rafa Cardozo MD;  Location: [facility] OR 2ND FLR;  Service: Colon and Rectal;  Laterality: N/A;    COLOSTOMY  2022    Procedure: CREATION, COLOSTOMY;  Surgeon: Rafa Cardozo MD;  Location: [facility] OR 2ND FLR;  Service: Colon and Rectal;;    CORONARY STENT PLACEMENT      LEFT HEART CATHETERIZATION Left 2022    Procedure: CATHETERIZATION, HEART, LEFT;  Surgeon: Yohannes Cordova MD;  Location: [facility] CATH LAB;  Service: Cardiology;  Laterality: Left;       Review of patient's allergies indicates:   Allergen Reactions    Augmentin [amoxicillin-pot clavulanate] Swelling     Not allergic to amoxicillin just a derivative of augmentin    Lisinopril Other (See Comments)     Fluid around heart    Losartan Other (See Comments)     High potassium    Shellfish containing products Anaphylaxis     Pt.states she is allergic to SEAFOOD since the age of 13    Levaquin [levofloxacin] Other (See  Comments)     Very bad joint pain    Clindamycin Palpitations     Chest pain       Medications:  Medications Prior to Admission   Medication Sig    acetaminophen (TYLENOL) 500 MG tablet Take 2 tablets (1,000 mg total) by mouth every 8 (eight) hours as needed for Pain.    albuterol (PROVENTIL/VENTOLIN HFA) 90 mcg/actuation inhaler Inhale 2 puffs into the lungs every 6 (six) hours as needed for Wheezing. Rescue    ALPRAZolam (XANAX) 0.5 MG tablet Take 1 tablet (0.5 mg total) by mouth nightly as needed for Anxiety.    aspirin (ECOTRIN) 81 MG EC tablet Take 1 tablet (81 mg total) by mouth once daily.    carvediloL (COREG) 3.125 MG tablet Take 1 tablet (3.125 mg total) by mouth 2 (two) times daily with meals.    clopidogreL (PLAVIX) 75 mg tablet Take 1 tablet (75 mg total) by mouth once daily.    collagenase (SANTYL) ointment Apply topically once daily.    docusate sodium (COLACE) 100 MG capsule Take 1 capsule (100 mg total) by mouth 2 (two) times daily.    esomeprazole (NEXIUM) 40 MG capsule Take 1 capsule (40 mg total) by mouth before breakfast.    ezetimibe (ZETIA) 10 mg tablet Take 1 tablet (10 mg total) by mouth every evening.    fluticasone propionate (FLONASE) 50 mcg/actuation nasal spray 1 spray (50 mcg total) by Each Nostril route once daily.    furosemide (LASIX) 20 MG tablet Take 1 tablet (20 mg total) by mouth once daily.    ondansetron (ZOFRAN-ODT) 8 MG TbDL Take 1 tablet (8 mg total) by mouth every 12 (twelve) hours as needed (nausea).    spironolactone (ALDACTONE) 25 MG tablet Take half a tablet (12.5 mg total) by mouth once daily.    traMADoL (ULTRAM) 50 mg tablet Take 1 tablet (50 mg total) by mouth every 8 (eight) hours as needed for Pain.    nitroGLYCERIN (NITROSTAT) 0.4 MG SL tablet Place 1 tablet (0.4 mg total) under the tongue every 5 (five) minutes as needed for Chest pain.    polyethylene glycol (GLYCOLAX) 17 gram/dose powder Take 17 g by mouth once daily.    simethicone  "(MYLICON) 80 MG chewable tablet Take 1 tablet (80 mg total) by mouth every 8 (eight) hours as needed for Flatulence.     Antibiotics (From admission, onward)                Start     Stop Route Frequency Ordered    22 2300  vancomycin 750 mg in dextrose 5 % 250 mL IVPB (ready to mix system)        "Followed by" Linked Group Details    -- IV Every 12 hours (non-standard times) 22 1006    22 1100  cefTRIAXone (ROCEPHIN) 2 g/50 mL D5W IVPB         -- IV Every 24 hours (non-standard times) 22 0947    22 1100  metronidazole IVPB 500 mg         -- IV Every 8 hours (non-standard times) 22 0947    22 1046  vancomycin - pharmacy to dose  (vancomycin IVPB)        "And" Linked Group Details    -- IV pharmacy to manage frequency 22 0947          Antifungals (From admission, onward)                None          Antivirals (From admission, onward)      None             Immunization History   Administered Date(s) Administered    COVID-19, MRNA, LN-S, PF (MODERNA FULL 0.5 ML DOSE) 08/10/2021    Influenza (FLUBLOK) - Quadrivalent - Recombinant - PF *Preferred* (egg allergy) 10/27/2020    PPD Test 2022    Td (Adult), Unspecified Formulation 2005    Td - PF (ADULT) 2021       Family History       Problem Relation (Age of Onset)    Emphysema Father    Heart attack Brother    Heart disease Mother          Social History     Socioeconomic History    Marital status:    Tobacco Use    Smoking status: Former Smoker     Packs/day: 0.50     Start date: 1976     Quit date: 2012     Years since quittin.6    Smokeless tobacco: Never Used   Substance and Sexual Activity    Alcohol use: No    Drug use: No     Social Determinants of Health     Financial Resource Strain: Low Risk     Difficulty of Paying Living Expenses: Not hard at all   Food Insecurity: No Food Insecurity    Worried About Running Out of Food in the Last Year: Never true    Ran Out of " Food in the Last Year: Never true   Transportation Needs: No Transportation Needs    Lack of Transportation (Medical): No    Lack of Transportation (Non-Medical): No   Physical Activity: Inactive    Days of Exercise per Week: 0 days    Minutes of Exercise per Session: 0 min   Stress: No Stress Concern Present    Feeling of Stress : Not at all   Social Connections: Socially Isolated    Frequency of Communication with Friends and Family: More than three times a week    Frequency of Social Gatherings with Friends and Family: Once a week    Attends Yarsanism Services: Never    Active Member of Clubs or Organizations: No    Attends Club or Organization Meetings: Never    Marital Status:    Housing Stability: Low Risk     Unable to Pay for Housing in the Last Year: No    Number of Places Lived in the Last Year: 1    Unstable Housing in the Last Year: No     Review of Systems   Constitutional:  Positive for activity change, appetite change, fatigue and fever.   Gastrointestinal:  Positive for nausea and vomiting.   All other systems reviewed and are negative.  Objective:     Vital Signs (Most Recent):  Temp: 97.5 °F (36.4 °C) (07/20/22 0721)  Pulse: 70 (07/20/22 0800)  Resp: 18 (07/20/22 0721)  BP: (!) 93/44 (07/20/22 0721)  SpO2: 97 % (07/20/22 0721)   Vital Signs (24h Range):  Temp:  [97.5 °F (36.4 °C)-98.1 °F (36.7 °C)] 97.5 °F (36.4 °C)  Pulse:  [59-89] 70  Resp:  [14-18] 18  SpO2:  [96 %-99 %] 97 %  BP: ()/(44-58) 93/44     Weight: 52.6 kg (115 lb 15.4 oz)  Body mass index is 20.54 kg/m².    Estimated Creatinine Clearance: 77.3 mL/min (based on SCr of 0.6 mg/dL).    Physical Exam  Vitals and nursing note reviewed.   Constitutional:       General: She is not in acute distress.     Appearance: She is not ill-appearing, toxic-appearing or diaphoretic.   HENT:      Head: Normocephalic and atraumatic.      Right Ear: External ear normal.      Left Ear: External ear normal.   Eyes:       Extraocular Movements: Extraocular movements intact.      Conjunctiva/sclera: Conjunctivae normal.      Pupils: Pupils are equal, round, and reactive to light.   Abdominal:      General: Abdomen is flat. Bowel sounds are normal.      Comments: Ostomy; drainage from wound?   Neurological:      General: No focal deficit present.      Mental Status: She is alert and oriented to person, place, and time. Mental status is at baseline.   Psychiatric:         Mood and Affect: Mood normal.         Behavior: Behavior normal.       Significant Labs: Blood Culture:   Recent Labs   Lab 02/25/22  1710 03/15/22  1254 07/18/22  1223   LABBLOO No growth after 5 days.  No growth after 5 days. No growth after 5 days. No Growth to date  No Growth to date  No Growth to date  No Growth to date     CBC:   Recent Labs   Lab 07/18/22  1300 07/19/22  0419 07/20/22  0607   WBC 3.65* 2.50* 1.85*   HGB 10.2* 10.0* 10.5*   HCT 31.4* 29.9* 32.2*    262 285     Wound Culture: No results for input(s): LABAERO in the last 4320 hours.    Significant Imaging: I have reviewed all pertinent imaging results/findings within the past 24 hours.

## 2022-07-20 NOTE — HPI
"65 yo female with Hx of colovaginal/vesico fistula, abscess, secondary to diverticulitis and now cholecystitis s/p cholecystectomy, appendectomy, fistula repair, and sigmoid colectomy 5/9. Developed wound dehiscense and subjective fever. Was on abx x 1 month. Admitted yesterday. CT imaging reviewed. ID is consulted for "dehiscent abdominal wound."  "

## 2022-07-20 NOTE — PROGRESS NOTES
Active pharmacy to dose vancomycin consult:    Patient reviewed, renal function reviewed, cultures reviewed, no new levels, continue current therapy; Next levels due: 7/21/22 @ 2624    Please contact inpatient pharmacy with any questions: [phone]  Thanks, Pallavi Pearl PharmD

## 2022-07-20 NOTE — PLAN OF CARE
VSS and afebrile, AAOx4. Pain/Nausea controlled with PRN medications. Abdominal/Sacral wound care performed during shift. Colostomy care performed per patient. Patient ambulating/repositioning independently. Plan of care reviewed with patient. Purposeful rounding done, call light at bed side, bed at lowest position, brakes on, non-skid socks on, will continue to monitor.     Problem: Adult Inpatient Plan of Care  Goal: Optimal Comfort and Wellbeing  7/20/2022 0206 by Evens Newsome RN  Outcome: Ongoing, Progressing  7/20/2022 0206 by Evens Newsome, RN  Outcome: Ongoing, Progressing  7/20/2022 0206 by Evens Newsome, RN  Outcome: Ongoing, Progressing     Problem: Impaired Wound Healing  Goal: Optimal Wound Healing  7/20/2022 0206 by Evens Newsome, RN  Outcome: Ongoing, Progressing  7/20/2022 0206 by Evens Newsome, RN  Outcome: Ongoing, Progressing  7/20/2022 0206 by Evens Newsome, RN  Outcome: Ongoing, Progressing     Problem: Fall Injury Risk  Goal: Absence of Fall and Fall-Related Injury  7/20/2022 0206 by Evens Newsome, RN  Outcome: Ongoing, Progressing  7/20/2022 0206 by Evens Newsome, RN  Outcome: Ongoing, Progressing  7/20/2022 0206 by Evens Newsome, RN  Outcome: Ongoing, Progressing     Problem: Pain Acute  Goal: Acceptable Pain Control and Functional Ability  7/20/2022 0206 by Evens Newsome, RN  Outcome: Ongoing, Progressing  7/20/2022 0206 by Evens Newsome, RN  Outcome: Ongoing, Progressing     Problem: Nausea and Vomiting  Goal: Fluid and Electrolyte Balance  Outcome: Ongoing, Progressing

## 2022-07-20 NOTE — CONSULTS
[facility] ([facility])  Consult Note      Date of Consultation:7/19/2022    Reason for Consult:  Abdominal pain, nausea and vomiting  SUBJECTIVE:     History of Present Illness:  66-year-old female with history of exploratory laparotomy, sigmoid colectomy with end-colostomy and Landry's pouch, takedown of colovesical and colovaginal fistulas, open cholecystectomy in May of 2022. Procedure performed for complications of sigmoid diverticulitis.  Patient with recent nausea and vomiting and pain below colostomy site.  Patient with superficial dehiscence of abdominal incision with fascial sutures extruding.  Patient has been seen in Wound Care as an outpatient for treatment of this.  Patient tolerating clear liquids and desires advancing diet.  Significant history of coronary artery disease and congestive heart failure.  Patient with sacral decubitus on admission.   Patient with some rectal and vaginal bleeding.    Past Medical History:   Diagnosis Date    Acute coronary syndrome     Acute exacerbation of chronic obstructive pulmonary disease (COPD) 3/23/2022    Allergy     Arthritis     Cardiomyopathy     CHF (congestive heart failure)     Coronary artery disease     Coronary artery disease of native artery of native heart with stable angina pectoris 4/19/2021 1/24/2022: [facility]: Cath: LAD: Proximal stent patent. LCX: Proximal 80%. LCX: Dominant. Moderate disease. LCX: HEVER 2.75 x 18 mm. Distal dissection. HEVER 2.75 x 22 mm.     Diverticulitis     Diverticulosis     Familial hypercholesterolemia 1/22/2022    Former smoker 1/24/2022    Heart attack     Heart disease     History of myocardial infarction 1/24/2022    Hyperlipidemia     Hypertension     Hypertension 1/24/2022    ICD (implantable cardioverter-defibrillator) in place     Non-ST elevation myocardial infarction (NSTEMI) 2/4/2019     Past Surgical History:   Procedure Laterality Date    APPENDECTOMY N/A 5/9/2022    Procedure:  APPENDECTOMY;  Surgeon: Rafa Cardozo MD;  [facility];  Service: Colon and Rectal;  Laterality: N/A;    ATRIAL CARDIAC PACEMAKER INSERTION      CECECTOMY N/A 2022    Procedure: EXCISION, CECUM;  Surgeon: Rafa Cardozo MD;  [facility];  Service: Colon and Rectal;  Laterality: N/A;     SECTION      CHOLECYSTECTOMY N/A 2022    Procedure: CHOLECYSTECTOMY;  Surgeon: Doug Epstein MD;  [facility];  Service: General;  Laterality: N/A;    COLONOSCOPY N/A 2022    Procedure: COLONOSCOPY;  Surgeon: Rafa Cardozo MD;  [facility];  Service: Colon and Rectal;  Laterality: N/A;    COLOSTOMY  2022    Procedure: CREATION, COLOSTOMY;  Surgeon: Rafa Cardozo MD;  [facility]: [facility];  Service: Colon and Rectal;;    CORONARY STENT PLACEMENT      LEFT HEART CATHETERIZATION Left 2022    Procedure: CATHETERIZATION, HEART, LEFT;  Surgeon: Yohannes Cordova MD;  Location: [facility] CATH LAB;  Service: Cardiology;  Laterality: Left;     Family History   Problem Relation Age of Onset    Heart disease Mother     Emphysema Father     Heart attack Brother      Social History     Tobacco Use    Smoking status: Former Smoker     Packs/day: 0.50     Start date: 1976     Quit date: 2012     Years since quittin.6    Smokeless tobacco: Never Used   Substance Use Topics    Alcohol use: No    Drug use: No       Current Facility-Administered Medications:     acetaminophen tablet 650 mg, 650 mg, Oral, Q6H PRN, Martha Khan DNP    albuterol-ipratropium 2.5 mg-0.5 mg/3 mL nebulizer solution 3 mL, 3 mL, Nebulization, Q6H PRN, Martha Khan DNP    aspirin EC tablet 81 mg, 81 mg, Oral, Daily, Martah Khan DNP    carvediloL tablet 3.125 mg, 3.125 mg, Oral, BID WM, Martha Khan DNP    cefTRIAXone (ROCEPHIN) 2 g/50 mL D5W IVPB, 2 g, Intravenous, Q24H, Brandon Anderson MD, Last Rate: 50  mL/hr at 07/19/22 1334, 2 g at 07/19/22 1334    clopidogreL tablet 75 mg, 75 mg, Oral, Daily, Martha Khan DNP    collagenase ointment, , Topical (Top), Daily, Brandon Anderson MD    docusate sodium capsule 100 mg, 100 mg, Oral, BID, Martha Khan DNP, 100 mg at 07/18/22 2237    HYDROcodone-acetaminophen 5-325 mg per tablet 1 tablet, 1 tablet, Oral, Q6H PRN, Martha Khan DNP, 1 tablet at 07/18/22 2237    melatonin tablet 6 mg, 6 mg, Oral, Nightly PRN, Lea Watkins MD    metronidazole IVPB 500 mg, 500 mg, Intravenous, Q8H, Brandon Anderson MD, Stopped at 07/19/22 1552    ondansetron injection 4 mg, 4 mg, Intravenous, Q6H PRN, Martha Khan DNP, 4 mg at 07/19/22 1105    pantoprazole EC tablet 40 mg, 40 mg, Oral, Daily, Martha Khan DNP    promethazine (PHENERGAN) 12.5 mg in dextrose 5 % 50 mL IVPB, 12.5 mg, Intravenous, Q6H PRN, Martha Khan DNP    simethicone chewable tablet 80 mg, 80 mg, Oral, Q8H PRN, Martha Khan DNP    sodium chloride 0.9% flush 10 mL, 10 mL, Intravenous, PRN, Lea Watkins MD    Pharmacy to dose Vancomycin consult, , , Once **AND** vancomycin - pharmacy to dose, , Intravenous, pharmacy to manage frequency, Brandon Anderson MD    vancomycin 1.25 g in dextrose 5% 250 mL IVPB (ready to mix), 1,250 mg, Intravenous, Once **FOLLOWED BY** vancomycin 750 mg in dextrose 5 % 250 mL IVPB (ready to mix system), 750 mg, Intravenous, Q12H, Brandon Anderson MD     Review of patient's allergies indicates:   Allergen Reactions    Augmentin [amoxicillin-pot clavulanate] Swelling     Not allergic to amoxicillin just a derivative of augmentin    Lisinopril Other (See Comments)     Fluid around heart    Losartan Other (See Comments)     High potassium    Shellfish containing products Anaphylaxis     Pt.states she is allergic to SEAFOOD since the age of 13    Levaquin [levofloxacin] Other (See Comments)     Very bad joint pain    Clindamycin  Palpitations     Chest pain             Current Facility-Administered Medications   Medication    acetaminophen tablet 650 mg    albuterol-ipratropium 2.5 mg-0.5 mg/3 mL nebulizer solution 3 mL    aspirin EC tablet 81 mg    carvediloL tablet 3.125 mg    cefTRIAXone (ROCEPHIN) 2 g/50 mL D5W IVPB    clopidogreL tablet 75 mg    collagenase ointment    docusate sodium capsule 100 mg    HYDROcodone-acetaminophen 5-325 mg per tablet 1 tablet    melatonin tablet 6 mg    metronidazole IVPB 500 mg    ondansetron injection 4 mg    pantoprazole EC tablet 40 mg    promethazine (PHENERGAN) 12.5 mg in dextrose 5 % 50 mL IVPB    simethicone chewable tablet 80 mg    sodium chloride 0.9% flush 10 mL    vancomycin - pharmacy to dose    vancomycin 1.25 g in dextrose 5% 250 mL IVPB (ready to mix)    Followed by    vancomycin 750 mg in dextrose 5 % 250 mL IVPB (ready to mix system)       OBJECTIVE:     Physical exam  Afebrile, vital signs stable,  66-year-old female in no acute distress, asthenic, does not appear toxic.  Awake alert and oriented  HEENT-atraumatic, normocephalic, anicteric  Neck-supple, trachea midline  Chest-clear  Heart-regular rate and rhythm  Abdomen-soft, nondistended, normal bowel sounds, left upper quadrant colostomy functioning with significant amounts of stool.  Midline incision with area of dehiscence extending to fascia, no evidence of surrounding cellulitis or abscess, no evidence of enteric drainage.  Extremities-no tenderness    Laboratory:  Recent Labs   Lab 07/19/22  0419   WBC 2.50*   RBC 3.73*   HGB 10.0*   HCT 29.9*      MCV 80*   MCH 26.8*   MCHC 33.4     BMP:   Recent Labs   Lab 07/18/22  1300 07/19/22  0419    85   * 131*   K 4.1 3.5   CL 91* 96   CO2 27 26   BUN 12 12   CREATININE 0.7 0.6   CALCIUM 8.4* 8.4*   MG 1.7  --      Lab Results   Component Value Date    CALCIUM 8.4 (L) 07/19/2022    PHOS 2.6 (L) 06/10/2022     BNP  Recent Labs   Lab 07/18/22  1300    BNP 4,567*     Lab Results   Component Value Date    URICACID 6.3 (H) 07/18/2022   No results found for: IRON, TIBC, FERRITIN, SATURATEDIRO  Lab Results   Component Value Date    CALCIUM 8.4 (L) 07/19/2022    PHOS 2.6 (L) 06/10/2022       Diagnostic Results:  CT Abdomen Pelvis With Contrast  Narrative: EXAMINATION:  CT ABDOMEN PELVIS WITH CONTRAST    CLINICAL HISTORY:  Abdominal abscess/infection suspected;    TECHNIQUE:  Low dose axial images, sagittal and coronal reformations were obtained from the lung bases to the pubic symphysis following the IV administration of 75 mL of Omnipaque 350.    COMPARISON:  06/06/2022    FINDINGS:  The lung bases show small pleural effusions, right greater than left, similar compared to prior.  Persistent reticular opacities at the lung bases which can be seen with mild edema or underlying interstitial lung disease.    The liver is homogeneous.  Gallbladder is unremarkable.  Spleen and pancreas are unremarkable.    Adrenal glands are normal.  Kidneys concentrate contrast appropriately.  The urinary bladder is unremarkable.    Ectasia of the infrarenal abdominal aorta measuring 2 cm.  Atherosclerosis.  No retroperitoneal or mesenteric lymph node enlargement seen.    Stomach and loops of bowel are normal caliber.  Duodenal diverticulum.  Left upper quadrant colostomy with postoperative changes at the level of the rectum.  Mild edema and trace free fluid in the pelvis, similar compared to prior.    Regional skeleton shows degenerative change L5-S1.  Impression: Interval development of a collection of air and fluid along the midline incision anterior abdominal wall measuring 2.4 x 1 cm suspicious for a small developing abscess.  This is too small for percutaneous drain at this time.    Prior partial colectomy with left upper quadrant colostomy.  Stable edema and trace fluid in the pelvis with no focal fluid collection seen.    Small pleural effusions, right greater than  left.    Electronically signed by: Layla Verdugo  Date:    07/18/2022  Time:    15:02  X-Ray Chest AP Portable  Narrative: EXAMINATION:  XR CHEST AP PORTABLE    CLINICAL HISTORY:  Sepsis;    TECHNIQUE:  Single frontal view of the chest was performed.    COMPARISON:  06/08/2022    FINDINGS:  Lungs are well expanded.  No acute consolidation, pleural effusion, or pneumothorax.  Reticular opacities in both lungs unchanged.    Cardiac silhouette is mildly enlarged, stable.  Left chest wall pacing device with leads in stable position  Impression: Stable chest with no acute consolidation.  Reticular opacities in both lungs unchanged.  These can be seen with edema in the acute setting or interstitial lung disease in the chronic setting.    Electronically signed by: Layla Verdugo  Date:    07/18/2022  Time:    13:29      IMPRESSION:  Segmental dehiscence of abdominal incision.  No evidence of evisceration or enteric fistula.  Abdominal exam benign with functioning colostomy    Plan:  We will advance to low residue diet.  Continue local wound care.  Monitor vaginal and rectal bleeding.  No gross bleeding on physical exam today and small amounts of bleeding not unusual after repair of colovaginal fistula and colostomy.    Thank you for the opportunity of seeing Odette Hart in consultation

## 2022-07-21 PROBLEM — Z71.89 ADVANCE CARE PLANNING: Status: ACTIVE | Noted: 2022-01-01

## 2022-07-21 NOTE — ASSESSMENT & PLAN NOTE
Not in acute exacerbation.  Hold coreg due to blood pressure  Hold aldactone due to blood pressure  Hold losartan due to blood pressure  Resume lasix in AM with holding parameters to help with volume status    Borderline hypotension likely due to underlying cardiomyopathy (EF 20%, G3DD)

## 2022-07-21 NOTE — PLAN OF CARE
VSS and afebrile, AAOx4. NPO status initiated at midnight. Pain/Nausea controlled with PRN medications. Colostomy care performed per patient. Patient ambulating/repositioning independently. Plan of care reviewed with patient. Purposeful rounding done, call light at bed side, bed at lowest position, brakes on, non-skid socks on, will continue to monitor.    Problem: Adult Inpatient Plan of Care  Goal: Optimal Comfort and Wellbeing  Outcome: Ongoing, Progressing     Problem: Impaired Wound Healing  Goal: Optimal Wound Healing  Outcome: Ongoing, Progressing     Problem: Fall Injury Risk  Goal: Absence of Fall and Fall-Related Injury  Outcome: Ongoing, Progressing     Problem: Pain Acute  Goal: Acceptable Pain Control and Functional Ability  Outcome: Ongoing, Progressing     Problem: Nausea and Vomiting  Goal: Fluid and Electrolyte Balance  Outcome: Ongoing, Progressing        Will give Epogen with HD. Fe saturation 10%. Will give IV iron with HD.

## 2022-07-21 NOTE — SUBJECTIVE & OBJECTIVE
Past Medical History:   Diagnosis Date    Acute coronary syndrome     Acute exacerbation of chronic obstructive pulmonary disease (COPD) 3/23/2022    Allergy     Arthritis     Cardiomyopathy     CHF (congestive heart failure)     Coronary artery disease     Coronary artery disease of native artery of native heart with stable angina pectoris 2021: [facility]: Cath: LAD: Proximal stent patent. LCX: Proximal 80%. LCX: Dominant. Moderate disease. LCX: HEVER 2.75 x 18 mm. Distal dissection. HEVER 2.75 x 22 mm.     Diverticulitis     Diverticulosis     Familial hypercholesterolemia 2022    Former smoker 2022    Heart attack     Heart disease     History of myocardial infarction 2022    Hyperlipidemia     Hypertension     Hypertension 2022    ICD (implantable cardioverter-defibrillator) in place     Non-ST elevation myocardial infarction (NSTEMI) 2019       Past Surgical History:   Procedure Laterality Date    APPENDECTOMY N/A 2022    Procedure: APPENDECTOMY;  Surgeon: Rafa Cardozo MD;  [facility];  Service: Colon and Rectal;  Laterality: N/A;    ATRIAL CARDIAC PACEMAKER INSERTION      CECECTOMY N/A 2022    Procedure: EXCISION, CECUM;  Surgeon: Rafa Cardozo MD;  [facility];  Service: Colon and Rectal;  Laterality: N/A;     SECTION      CHOLECYSTECTOMY N/A 2022    Procedure: CHOLECYSTECTOMY;  Surgeon: Doug Epstein MD;  [facility];  Service: General;  Laterality: N/A;    COLONOSCOPY N/A 2022    Procedure: COLONOSCOPY;  Surgeon: Rafa Cardozo MD;  [facility];  Service: Colon and Rectal;  Laterality: N/A;    COLOSTOMY  2022    Procedure: CREATION, COLOSTOMY;  Surgeon: Rafa Cardozo MD;  [facility];  Service: Colon and Rectal;;    CORONARY STENT PLACEMENT      LEFT HEART CATHETERIZATION Left 2022    Procedure: CATHETERIZATION, HEART, LEFT;  Surgeon: Yohannes  MD Tasha;  Location: [facility] CATH LAB;  Service: Cardiology;  Laterality: Left;       Review of patient's allergies indicates:   Allergen Reactions    Augmentin [amoxicillin-pot clavulanate] Swelling     Not allergic to amoxicillin just a derivative of augmentin    Lisinopril Other (See Comments)     Fluid around heart    Losartan Other (See Comments)     High potassium    Shellfish containing products Anaphylaxis     Pt.states she is allergic to SEAFOOD since the age of 13    Levaquin [levofloxacin] Other (See Comments)     Very bad joint pain    Clindamycin Palpitations     Chest pain       Medications:  Continuous Infusions:  Scheduled Meds:   aspirin  81 mg Oral Daily    carvediloL  3.125 mg Oral BID WM    ceFEPime (MAXIPIME) IVPB  2 g Intravenous Q8H    clopidogreL  75 mg Oral Daily    collagenase   Topical (Top) Daily    docusate sodium  100 mg Oral BID    fluticasone propionate  2 spray Each Nostril Daily    metronidazole  500 mg Intravenous Q8H    pantoprazole  40 mg Oral Daily    vancomycin (VANCOCIN) IVPB  750 mg Intravenous Q12H     PRN Meds:acetaminophen, albuterol-ipratropium, HYDROcodone-acetaminophen, melatonin, ondansetron, promethazine (PHENERGAN) IVPB, simethicone, sodium chloride 0.9%, Pharmacy to dose Vancomycin consult **AND** vancomycin - pharmacy to dose    Family History       Problem Relation (Age of Onset)    Emphysema Father    Heart attack Brother    Heart disease Mother          Tobacco Use    Smoking status: Former Smoker     Packs/day: 0.50     Start date: 1976     Quit date: 2012     Years since quittin.6    Smokeless tobacco: Never Used   Substance and Sexual Activity    Alcohol use: No    Drug use: No    Sexual activity: Not on file       Review of Systems   Constitutional:  Negative for fever.   HENT:  Negative for congestion.    Eyes:  Negative for visual disturbance.   Respiratory:  Negative for shortness of breath.    Cardiovascular:  Negative for chest pain.    Gastrointestinal:  Positive for nausea.        (Improved)    Genitourinary:  Negative for difficulty urinating.   Musculoskeletal:  Negative for back pain.   Skin:  Positive for wound.   Neurological:  Negative for headaches.   Psychiatric/Behavioral:  Negative for confusion.    Objective:     Vital Signs (Most Recent):  Temp: 97.8 °F (36.6 °C) (07/21/22 0717)  Pulse: 68 (07/21/22 1000)  Resp: 18 (07/21/22 0717)  BP: (!) 91/54 (07/21/22 0717)  SpO2: 99 % (07/21/22 0717)   Vital Signs (24h Range):  Temp:  [97.8 °F (36.6 °C)-98.3 °F (36.8 °C)] 97.8 °F (36.6 °C)  Pulse:  [62-91] 68  Resp:  [15-18] 18  SpO2:  [97 %-99 %] 99 %  BP: ()/(47-58) 91/54     Weight: 52.6 kg (115 lb 15.4 oz)  Body mass index is 20.54 kg/m².    Physical Exam  Constitutional:       Comments: Sitting up in chair, chronically ill-appearing, polite and conversational    HENT:      Head: Normocephalic.      Nose: Nose normal.      Mouth/Throat:      Mouth: Mucous membranes are moist.   Cardiovascular:      Rate and Rhythm: Normal rate.   Pulmonary:      Comments: On room air   Skin:     General: Skin is warm.   Neurological:      General: No focal deficit present.      Mental Status: She is alert and oriented to person, place, and time.   Psychiatric:         Mood and Affect: Mood normal.         Behavior: Behavior normal.     Significant Labs: All pertinent labs within the past 24 hours have been reviewed.  CBC:   Recent Labs   Lab 07/21/22 0429   WBC 2.19*   HGB 10.1*   HCT 30.8*   MCV 82        BMP:  Recent Labs   Lab 07/21/22 0428   GLU 98   *   K 3.3*   CL 96   CO2 25   BUN 9   CREATININE 0.6   CALCIUM 7.8*     LFT:  Lab Results   Component Value Date    AST 25 07/19/2022    ALKPHOS 89 07/19/2022    BILITOT 1.5 (H) 07/19/2022     Albumin:   Albumin   Date Value Ref Range Status   07/19/2022 1.5 (L) 3.5 - 5.2 g/dL Final     Protein:   Total Protein   Date Value Ref Range Status   07/19/2022 6.1 6.0 - 8.4 g/dL Final      Lactic acid:   Lab Results   Component Value Date    LACTATE 1.4 07/18/2022    LACTATE 1.6 07/18/2022       Significant Imaging: I have reviewed all pertinent imaging results/findings within the past 24 hours.

## 2022-07-21 NOTE — ASSESSMENT & PLAN NOTE
- PT/OT consult  - Had traumatic experience at SNF in the past; would prefer to go home after discharge

## 2022-07-21 NOTE — CONSULTS
Interventional Radiology    Consulted to aspirate abdominal wall fluid collection noted on CT scan dated 7/18/22.  CT scan obtained for procedure and no residual fluid noted. The suspected early abscess noted on the prior examination likely represented fluid communicating with patient's known open abdominal wound rather than a walled off collection. With the interval packing of the wound the fluid is no longer present. Scheduled abscess aspiration canceled.    Abelardo De Leon MD  [phone]

## 2022-07-21 NOTE — CONSULTS
[facility] Surg ([facility])  Palliative Medicine  Consult Note    Patient Name: Odette Hart  MRN: [id number]  Admission Date: 7/18/2022  Hospital Length of Stay: 3 days  Code Status: Full Code   Attending Provider: Brandon Anderson MD  Consulting Provider: Leticia Morales MD  Primary Care Physician: Braxton Barragan MD  Principal Problem:Abdominal wall abscess    Patient information was obtained from patient, past medical records, ER records and primary team.      Inpatient consult to Palliative Care  Consult performed by: Leticia Morales MD  Consult ordered by: Brandon Anderson MD  Reason for consult: Advance Care Planning         Assessment/Plan:     Abdominal wall abscess  - See wound infection     Advance Care Planning       7/21/22  - Consult for advance care planning and support in patient with complex surgical history resulting in chronic wounds, currently in hospital for nausea, vomiting, and increased drainage from wounds. Chart reviewed in depth; discussed pt in person with primary team and during MDT rounds.   - Met with pt at bedside; awake, sitting up in chair, agreeable to conversation. Introduced role of palliative medicine, and learned more about pt outside of hospital. She has been  since 2011. Her 's family is from the [place] initially, so she told me about the [place] [employer] in [facility], which celebrates this heritage. She has two sons (Telly lives locally, whereas Abelardo lives in [place]). She was tearful while we discussed her strained relationship with her son Telly. For this reason, her preferred MPOA is her other son Abelardo Ortiz ([phone]); can complete MPOA paperwork prior to discharge. She does get along well with her grandson, who often will help with appointments. She resides alone, and does majority of her ADLs independently.   - She has always been active and outgoing (loves dancing, for example), and says it's been tough sitting around  "the house "like an old lady." Validated that it seems like she's been through a lot the last few months to years.   - She does have insight into the possibility of her wounds never healing, though for now, remains hopeful. Her specific goals are to improve her nutritional and functional status as much as possible. While she prefers to avoid being in the hospital, she stated, "if I didn't go, that means I would die." We did discuss an alternative path (hospice care, which she qualifies for based on severe protein-calorie nutrition, chronic wounds, HF with EF 20% and AICD), and she was clear that her current goals are not in line with this philosophy of care, and that she would like to continue maximal medical therapy. Did encourage her to consider hospice if she has a worsening functional status, is more frequently in the hospital, or is more symptomatic.   - Following discharge, she is open to having more support at home (ie, home PT/OT or HH), and does not want to go to a SNF. Told her we will respect this preference.   - Spiritual care consult for additional support; patient enjoys having visitors   - Did not discuss code status, as it was initial time meeting patient   - Our team will follow up with pt while admitted and during future hospital stays. Recommend home-based palliative medicine upon discharge.      Wound infection after surgery  - ID consulted; abx per their team and primary   - Per chart review, has history of exploratory laparotomy, sigmoid colectomy with end-colostomy and Landry's pouch, takedown of colovesical and colovaginal fistulas, open cholecystectomy in May of 2022. Procedure performed for complications of sigmoid diverticulitis.     Severe protein-calorie malnutrition  - BMI 20.54 at time of consult, though albumin 1.5 on recent labs. Likely contributes to difficulties with wound healing.     COPD (chronic obstructive pulmonary disease)  - Noted underlying disease     CAD (coronary " "artery disease)  - Noted underlying disease     Weakness  - PT/OT consult  - Had traumatic experience at SNF in the past; would prefer to go home after discharge     Alteration in skin integrity in adult  - General surgery and wound care following     Mood disorder  - We did discuss that she has depression related to her overall situation. Suggested consideration of antidepressant, and she would prefer to start at this time. Can revisit in future.     Colovesical fistula  - S/p surgical repair in past     Chronic congestive heart failure  - Cardiac medications and management per primary; has AICD in place, EF estimated to be 20%.     Thank you for your consult. I will follow-up with patient. Please contact us if you have any additional questions.     ELSA Tsang  Palliative Medicine Staff   [phone]    Total visit time: 86 minutes    > 50% of 70 min visit spent in chart review, face to face discussion of symptom assessment, coordination of care with other specialists, and discharge planning.    16 min ACP time spent: goals of care, emotional support, formulating and communicating prognosis, exploring burden/ benefit of various approaches of treatment.     Subjective:     HPI:   (From H&P) "Ms. Pino is a 66-year-old female with a past medical history that includes adiverticulitis s/p cystoscopy with BL ureteral catheters, open sigmoid colon resection, appendectomy, and creation of end colostomy on 5/9/22.  She also has coronary artery disease with stents and cardiomyopathy, heart failure and AICD placement.  She seems wound care outpatient for sacral wound and a right lower abdomen surgical wound.  Patient came to emergency department today due to pain to the area of skin around her colostomy and weakness.  She reports she has not been eating and drinking for the past week.  She has pain to her stomach with intake of solids or liquids.  Patient also reports that she had the appearance of blood clots " "come out of her rectum and some blood on her underwear indicating some red blood from her vagina.  Patient does ambulate.  She has a sacral wound that she sees wound care Tuesday through Friday outpatient.  She reports she is not taking medications since last week due to her nausea.  She lives at home by herself with help from her grandson.  General surgery has been consulted due to concern for a possible abscess.  Patient admitted to hospital medicine for further management."    Palliative medicine is consulted for advance care planning and support, given her multiple chronic medical conditions. Met with pt at bedside; for details of discussion, see advance care planning section of plan.         Past Medical History:   Diagnosis Date    Acute coronary syndrome     Acute exacerbation of chronic obstructive pulmonary disease (COPD) 3/23/2022    Allergy     Arthritis     Cardiomyopathy     CHF (congestive heart failure)     Coronary artery disease     Coronary artery disease of native artery of native heart with stable angina pectoris 4/19/2021 1/24/2022: [facility]: Cath: LAD: Proximal stent patent. LCX: Proximal 80%. LCX: Dominant. Moderate disease. LCX: HEVER 2.75 x 18 mm. Distal dissection. HEVER 2.75 x 22 mm.     Diverticulitis     Diverticulosis     Familial hypercholesterolemia 1/22/2022    Former smoker 1/24/2022    Heart attack     Heart disease     History of myocardial infarction 1/24/2022    Hyperlipidemia     Hypertension     Hypertension 1/24/2022    ICD (implantable cardioverter-defibrillator) in place     Non-ST elevation myocardial infarction (NSTEMI) 2/4/2019       Past Surgical History:   Procedure Laterality Date    APPENDECTOMY N/A 5/9/2022    Procedure: APPENDECTOMY;  Surgeon: Rafa Cardozo MD;  Location: [facility] OR [address] [place] [address];  Service: Colon and Rectal;  Laterality: N/A;    ATRIAL CARDIAC PACEMAKER INSERTION      CECECTOMY N/A 5/9/2022    Procedure: EXCISION, CECUM;  Surgeon: " Rafa Cardozo MD;  Location: [facility] OR 2ND FLR;  Service: Colon and Rectal;  Laterality: N/A;     SECTION      CHOLECYSTECTOMY N/A 2022    Procedure: CHOLECYSTECTOMY;  Surgeon: Doug Epstein MD;  Location: [facility] OR 2ND FLR;  Service: General;  Laterality: N/A;    COLONOSCOPY N/A 2022    Procedure: COLONOSCOPY;  Surgeon: Rafa Cardozo MD;  Location: [facility] OR 2ND FLR;  Service: Colon and Rectal;  Laterality: N/A;    COLOSTOMY  2022    Procedure: CREATION, COLOSTOMY;  Surgeon: Rafa Cardozo MD;  Location: [facility] OR 2ND FLR;  Service: Colon and Rectal;;    CORONARY STENT PLACEMENT      LEFT HEART CATHETERIZATION Left 2022    Procedure: CATHETERIZATION, HEART, LEFT;  Surgeon: Yohannes Cordova MD;  Location: [facility] [place] CATH LAB;  Service: Cardiology;  Laterality: Left;       Review of patient's allergies indicates:   Allergen Reactions    Augmentin [amoxicillin-pot clavulanate] Swelling     Not allergic to amoxicillin just a derivative of augmentin    Lisinopril Other (See Comments)     Fluid around heart    Losartan Other (See Comments)     High potassium    Shellfish containing products Anaphylaxis     Pt.states she is allergic to SEAFOOD since the age of 13    Levaquin [levofloxacin] Other (See Comments)     Very bad joint pain    Clindamycin Palpitations     Chest pain       Medications:  Continuous Infusions:  Scheduled Meds:   aspirin  81 mg Oral Daily    carvediloL  3.125 mg Oral BID WM    ceFEPime (MAXIPIME) IVPB  2 g Intravenous Q8H    clopidogreL  75 mg Oral Daily    collagenase   Topical (Top) Daily    docusate sodium  100 mg Oral BID    fluticasone propionate  2 spray Each Nostril Daily    metronidazole  500 mg Intravenous Q8H    pantoprazole  40 mg Oral Daily    vancomycin (VANCOCIN) IVPB  750 mg Intravenous Q12H     PRN Meds:acetaminophen, albuterol-ipratropium, HYDROcodone-acetaminophen, melatonin, ondansetron, promethazine (PHENERGAN) IVPB,  simethicone, sodium chloride 0.9%, Pharmacy to dose Vancomycin consult **AND** vancomycin - pharmacy to dose    Family History       Problem Relation (Age of Onset)    Emphysema Father    Heart attack Brother    Heart disease Mother          Tobacco Use    Smoking status: Former Smoker     Packs/day: 0.50     Start date: 1976     Quit date: 2012     Years since quittin.6    Smokeless tobacco: Never Used   Substance and Sexual Activity    Alcohol use: No    Drug use: No    Sexual activity: Not on file       Review of Systems   Constitutional:  Negative for fever.   HENT:  Negative for congestion.    Eyes:  Negative for visual disturbance.   Respiratory:  Negative for shortness of breath.    Cardiovascular:  Negative for chest pain.   Gastrointestinal:  Positive for nausea.        (Improved)    Genitourinary:  Negative for difficulty urinating.   Musculoskeletal:  Negative for back pain.   Skin:  Positive for wound.   Neurological:  Negative for headaches.   Psychiatric/Behavioral:  Negative for confusion.    Objective:     Vital Signs (Most Recent):  Temp: 97.8 °F (36.6 °C) (22 07)  Pulse: 68 (22 1000)  Resp: 18 (22 07)  BP: (!) 91/54 (22 07)  SpO2: 99 % (22)   Vital Signs (24h Range):  Temp:  [97.8 °F (36.6 °C)-98.3 °F (36.8 °C)] 97.8 °F (36.6 °C)  Pulse:  [62-91] 68  Resp:  [15-18] 18  SpO2:  [97 %-99 %] 99 %  BP: ()/(47-58) 91/54     Weight: 52.6 kg (115 lb 15.4 oz)  Body mass index is 20.54 kg/m².    Physical Exam  Constitutional:       Comments: Sitting up in chair, chronically ill-appearing, polite and conversational    HENT:      Head: Normocephalic.      Nose: Nose normal.      Mouth/Throat:      Mouth: Mucous membranes are moist.   Cardiovascular:      Rate and Rhythm: Normal rate.   Pulmonary:      Comments: On room air   Skin:     General: Skin is warm.   Neurological:      General: No focal deficit present.      Mental Status: She is alert  and oriented to person, place, and time.   Psychiatric:         Mood and Affect: Mood normal.         Behavior: Behavior normal.     Significant Labs: All pertinent labs within the past 24 hours have been reviewed.  CBC:   Recent Labs   Lab 07/21/22 0429   WBC 2.19*   HGB 10.1*   HCT 30.8*   MCV 82        BMP:  Recent Labs   Lab 07/21/22 0428   GLU 98   *   K 3.3*   CL 96   CO2 25   BUN 9   CREATININE 0.6   CALCIUM 7.8*     LFT:  Lab Results   Component Value Date    AST 25 07/19/2022    ALKPHOS 89 07/19/2022    BILITOT 1.5 (H) 07/19/2022     Albumin:   Albumin   Date Value Ref Range Status   07/19/2022 1.5 (L) 3.5 - 5.2 g/dL Final     Protein:   Total Protein   Date Value Ref Range Status   07/19/2022 6.1 6.0 - 8.4 g/dL Final     Lactic acid:   Lab Results   Component Value Date    LACTATE 1.4 07/18/2022    LACTATE 1.6 07/18/2022       Significant Imaging: I have reviewed all pertinent imaging results/findings within the past 24 hours.

## 2022-07-21 NOTE — ASSESSMENT & PLAN NOTE
Poorly healing sacral and abdominal wound in setting of chronic illness and malnutrition. Sacral and abdominal wounds present on admission.  Patient is not a candidate for abdominal wound VAC as patient has retained foreign material (sutures) within the abdominal wound.  This was discussed with Wound Care and General surgery.

## 2022-07-21 NOTE — PROGRESS NOTES
House day 3.  Status post exploratory laparotomy with sigmoid colectomy, end colostomy, Landry's pouch, evacuation of pelvic abscess at [facility]  Status post small upper midline wound dehiscence    Subjective  Tolerating low residue diet  Denies nausea    PE  Afebrile  Vital signs stable  Abdomen-soft, nontender, nondistended, stoma functioning  Incision-wounds packed, no gross necrosis abscess or cellulitis    CT abdomen  Interval resolution of small fluid collection    Surgically stable  Will advance to regular diet

## 2022-07-21 NOTE — ASSESSMENT & PLAN NOTE
Relative hypotension in the setting of malnutrition and abdominal wall infection    Check AM cortisol

## 2022-07-21 NOTE — ASSESSMENT & PLAN NOTE
- We did discuss that she has depression related to her overall situation. Suggested consideration of antidepressant, and she would prefer to start at this time. Can revisit in future.

## 2022-07-21 NOTE — PROGRESS NOTES
[facility] Medicine  Progress Note    Patient Name: Odette Hart  MRN: [id number]  Patient Class: IP- Inpatient   Admission Date: 7/18/2022  Length of Stay: 3 days  Attending Physician: Brandon Anderson MD  Primary Care Provider: Braxton Barragan MD        Subjective:     Principal Problem:Abdominal wall abscess        HPI:  Ms. Pino is a 66-year-old female with a past medical history that includes adiverticulitis s/p cystoscopy with BL ureteral catheters, open sigmoid colon resection, appendectomy, and creation of end colostomy on 5/9/22.  She also has coronary artery disease with stents and cardiomyopathy, heart failure and AICD placement.  She seems wound care outpatient for sacral wound and a right lower abdomen surgical wound.  Patient came to emergency department today due to pain to the area of skin around her colostomy and weakness.  She reports she has not been eating and drinking for the past week.  She has pain to her stomach with intake of solids or liquids.  Patient also reports that she had the appearance of blood clots come out of her rectum and some blood on her underwear indicating some red blood from her vagina.  Patient does ambulate.  She has a sacral wound that she sees wound care Tuesday through Friday outpatient.  She reports she is not taking medications since last week due to her nausea.  She lives at home by herself with help from her grandson.  General surgery has been consulted due to concern for a possible abscess.  Patient admitted to hospital medicine for further management.      Overview/Hospital Course:  Chronically ill patient with malnutrition and nonhealing open abdominal wound. Thought to have abdominal wall soft tissue infection. Unable to aspirate fluid from the abdominal wall.       Interval History: unable to aspirate fluid from the abdominal wall with IR. Resuming diet.    Review of Systems   Constitutional:  Negative for chills,  fatigue and fever.   HENT:  Negative for congestion and ear pain.    Respiratory:  Negative for shortness of breath.    Cardiovascular:  Negative for chest pain.   Musculoskeletal:  Negative for back pain.   Skin:  Positive for wound.   Objective:     Vital Signs (Most Recent):  Temp: 97.6 °F (36.4 °C) (07/21/22 1432)  Pulse: 77 (07/21/22 1432)  Resp: 18 (07/21/22 1432)  BP: 115/61 (07/21/22 1432)  SpO2: 99 % (07/21/22 1432)   Vital Signs (24h Range):  Temp:  [97.6 °F (36.4 °C)-98.3 °F (36.8 °C)] 97.6 °F (36.4 °C)  Pulse:  [62-91] 77  Resp:  [15-18] 18  SpO2:  [97 %-100 %] 99 %  BP: ()/(47-61) 115/61     Weight: 52.6 kg (115 lb 15.4 oz)  Body mass index is 20.54 kg/m².    Intake/Output Summary (Last 24 hours) at 7/21/2022 1454  Last data filed at 7/21/2022 1200  Gross per 24 hour   Intake --   Output 1420 ml   Net -1420 ml        Physical Exam  Vitals reviewed.   Constitutional:       General: She is not in acute distress.     Comments: Chronically ill appearing   HENT:      Head: Normocephalic.   Eyes:      Pupils: Pupils are equal, round, and reactive to light.   Cardiovascular:      Rate and Rhythm: Normal rate and regular rhythm.   Pulmonary:      Breath sounds: No wheezing or rales.   Abdominal:      Comments: Bowel sounds present. Chronic abdominal wound with fibrinous material adjacent to the wound.    Musculoskeletal:         General: No swelling. Normal range of motion.      Cervical back: Neck supple.   Skin:     General: Skin is warm.      Comments: Sacral wound through to fat, noted on admission.    Neurological:      General: No focal deficit present.      Mental Status: She is alert and oriented to person, place, and time.       Significant Labs: All pertinent labs within the past 24 hours have been reviewed.    Significant Imaging: I have reviewed all pertinent imaging results/findings within the past 24 hours.      Assessment/Plan:      * Abdominal wall abscess  Abdominal wall fluid collection.  Symptoms thought to be from abdominal wall infection  Discussed percutaneous drainage with IR; drainage cancelled    Continue IV abx  ID following; discuss final abx recs  Repeat inflammatory markers in AM      Advance care planning        Wound infection after surgery        Severe protein-calorie malnutrition  Albumin 1.8. Pt with poor intake. Nutritionist consulted.       COPD (chronic obstructive pulmonary disease)  Not in exacerbation. Unable to locate PFTs. Pt is a former smoker. PRN duo-nebs ordered.      CAD (coronary artery disease)  Continue aspirin  Continue plavix  Continue ezetimibe      Hypotension  Relative hypotension in the setting of malnutrition and abdominal wall infection    Check AM cortisol      Weakness        Hyponatremia  Improving. Pt is asymptomatic.      Alteration in skin integrity in adult  Poorly healing sacral and abdominal wound in setting of chronic illness and malnutrition.  Sacral and abdominal wounds present on admission      Mood disorder      Colovesical fistula  General surgery following      Chronic congestive heart failure  Not in acute exacerbation.  Hold coreg due to blood pressure  Hold aldactone due to blood pressure  Hold losartan due to blood pressure  Resume lasix in AM with holding parameters to help with volume status    Borderline hypotension likely due to underlying cardiomyopathy (EF 20%, G3DD)        VTE Risk Mitigation (From admission, onward)         Ordered     IP VTE HIGH RISK PATIENT  Once         07/18/22 1652     Place sequential compression device  Until discontinued         07/18/22 1652                Discharge Planning   SIVAN:      Code Status: Full Code   Is the patient medically ready for discharge?:     Reason for patient still in hospital (select all that apply): Consult recommendations  Discharge Plan A: Home                  Brandon Anderson MD  Department of Hospital Medicine   [employer] - [facility]

## 2022-07-21 NOTE — ASSESSMENT & PLAN NOTE
- ID consulted; abx per their team and primary   - Per chart review, has history of exploratory laparotomy, sigmoid colectomy with end-colostomy and Landry's pouch, takedown of colovesical and colovaginal fistulas, open cholecystectomy in May of 2022. Procedure performed for complications of sigmoid diverticulitis.

## 2022-07-21 NOTE — H&P
[facility] - Med Surg ([facility])  History & Physical - Short Stay  Interventional Radiology    SUBJECTIVE:     Chief Complaint/Reason for Admission: Vomiting    Informant(s):  self and Electronic Health Record    History of Present Illness:  Odette Hart is a 66 y.o. female with a history of abdominal surgeries and open abdominal wound.    Patient presents for aspiration of suspected developing abscess.    Scheduled Meds:    aspirin  81 mg Oral Daily    carvediloL  3.125 mg Oral BID WM    ceFEPime (MAXIPIME) IVPB  2 g Intravenous Q8H    clopidogreL  75 mg Oral Daily    collagenase   Topical (Top) Daily    docusate sodium  100 mg Oral BID    fluticasone propionate  2 spray Each Nostril Daily    metronidazole  500 mg Intravenous Q8H    pantoprazole  40 mg Oral Daily    vancomycin (VANCOCIN) IVPB  750 mg Intravenous Q12H     Continuous Infusions:   PRN Meds: acetaminophen, albuterol-ipratropium, HYDROcodone-acetaminophen, melatonin, ondansetron, promethazine (PHENERGAN) IVPB, simethicone, sodium chloride 0.9%, Pharmacy to dose Vancomycin consult **AND** vancomycin - pharmacy to dose    Review of patient's allergies indicates:   Allergen Reactions    Augmentin [amoxicillin-pot clavulanate] Swelling     Not allergic to amoxicillin just a derivative of augmentin    Lisinopril Other (See Comments)     Fluid around heart    Losartan Other (See Comments)     High potassium    Shellfish containing products Anaphylaxis     Pt.states she is allergic to SEAFOOD since the age of 13    Levaquin [levofloxacin] Other (See Comments)     Very bad joint pain    Clindamycin Palpitations     Chest pain       Past Medical History:   Diagnosis Date    Acute coronary syndrome     Acute exacerbation of chronic obstructive pulmonary disease (COPD) 3/23/2022    Allergy     Arthritis     Cardiomyopathy     CHF (congestive heart failure)     Coronary artery disease     Coronary artery disease of native artery of  native heart with stable angina pectoris 2021: [facility]: Cath: LAD: Proximal stent patent. LCX: Proximal 80%. LCX: Dominant. Moderate disease. LCX: HEVER 2.75 x 18 mm. Distal dissection. HEVER 2.75 x 22 mm.     Diverticulitis     Diverticulosis     Familial hypercholesterolemia 2022    Former smoker 2022    Heart attack     Heart disease     History of myocardial infarction 2022    Hyperlipidemia     Hypertension     Hypertension 2022    ICD (implantable cardioverter-defibrillator) in place     Non-ST elevation myocardial infarction (NSTEMI) 2019     Past Surgical History:   Procedure Laterality Date    APPENDECTOMY N/A 2022    Procedure: APPENDECTOMY;  Surgeon: Rafa Cardozo MD;  [facility];  Service: Colon and Rectal;  Laterality: N/A;    ATRIAL CARDIAC PACEMAKER INSERTION      CECECTOMY N/A 2022    Procedure: EXCISION, CECUM;  Surgeon: Rafa Cardozo MD;  Location: [facility] OR 2ND FLR;  Service: Colon and Rectal;  Laterality: N/A;     SECTION      CHOLECYSTECTOMY N/A 2022    Procedure: CHOLECYSTECTOMY;  Surgeon: Doug Epstein MD;  Location: [facility] OR 2ND FLR;  Service: General;  Laterality: N/A;    COLONOSCOPY N/A 2022    Procedure: COLONOSCOPY;  Surgeon: Rafa Cardozo MD;  Location: [facility] OR 2ND FLR;  Service: Colon and Rectal;  Laterality: N/A;    COLOSTOMY  2022    Procedure: CREATION, COLOSTOMY;  Surgeon: Rafa Cardozo MD;  Location: [facility] OR 2ND FLR;  Service: Colon and Rectal;;    CORONARY STENT PLACEMENT      LEFT HEART CATHETERIZATION Left 2022    Procedure: CATHETERIZATION, HEART, LEFT;  Surgeon: Yohannes Cordova MD;  Location: [facility] CATH LAB;  Service: Cardiology;  Laterality: Left;     Family History   Problem Relation Age of Onset    Heart disease Mother     Emphysema Father     Heart attack Brother      Social History     Tobacco Use    Smoking status: Former Smoker      Packs/day: 0.50     Start date: 1976     Quit date: 2012     Years since quittin.6    Smokeless tobacco: Never Used   Substance Use Topics    Alcohol use: No    Drug use: No        Review of Systems:  ROS not obtained    OBJECTIVE:     Vital Signs (Most Recent):  Temp: 98 °F (36.7 °C) (22 1136)  Pulse: 66 (22 1200)  Resp: 16 (22 1136)  BP: (!) 106/55 (22 1136)  SpO2: 100 % (22 113)    Physical Exam:  Lungs: No respiratory distress  Cardiac: regular rate and rhythm    Laboratory  CBC:   Lab Results   Component Value Date/Time    WBC 2.19 (L) 2022 04:29 AM    RBC 3.75 (L) 2022 04:29 AM    HGB 10.1 (L) 2022 04:29 AM    HCT 30.8 (L) 2022 04:29 AM    HCT 46 2022 09:55 PM     2022 04:29 AM    MCV 82 2022 04:29 AM    MCH 26.9 (L) 2022 04:29 AM    MCHC 32.8 2022 04:29 AM     Coagulation:   Lab Results   Component Value Date/Time    INR 1.2 2022 04:28 AM    APTT 39.7 (H) 2022 06:31 AM         ASSESSMENT/PLAN:     Suspected developing abdominal wall abscess.    Patient will undergo CT guided aspiration of abdominal wall fluid collection.    Sedation/Anesthesia Assessment:  ASA Classification: II = Mild systemic disease  Mallampati Score: II (hard and soft palate, upper portion of tonsils anduvula visible)    Sedation History: No problems    Sedation Plan: Lidocaine local, conscious sedation if needed.

## 2022-07-21 NOTE — SUBJECTIVE & OBJECTIVE
Interval History: unable to aspirate fluid from the abdominal wall with IR. Resuming diet.    Review of Systems   Constitutional:  Negative for chills, fatigue and fever.   HENT:  Negative for congestion and ear pain.    Respiratory:  Negative for shortness of breath.    Cardiovascular:  Negative for chest pain.   Musculoskeletal:  Negative for back pain.   Skin:  Positive for wound.   Objective:     Vital Signs (Most Recent):  Temp: 97.6 °F (36.4 °C) (07/21/22 1432)  Pulse: 77 (07/21/22 1432)  Resp: 18 (07/21/22 1432)  BP: 115/61 (07/21/22 1432)  SpO2: 99 % (07/21/22 1432)   Vital Signs (24h Range):  Temp:  [97.6 °F (36.4 °C)-98.3 °F (36.8 °C)] 97.6 °F (36.4 °C)  Pulse:  [62-91] 77  Resp:  [15-18] 18  SpO2:  [97 %-100 %] 99 %  BP: ()/(47-61) 115/61     Weight: 52.6 kg (115 lb 15.4 oz)  Body mass index is 20.54 kg/m².    Intake/Output Summary (Last 24 hours) at 7/21/2022 1454  Last data filed at 7/21/2022 1200  Gross per 24 hour   Intake --   Output 1420 ml   Net -1420 ml        Physical Exam  Vitals reviewed.   Constitutional:       General: She is not in acute distress.     Comments: Chronically ill appearing   HENT:      Head: Normocephalic.   Eyes:      Pupils: Pupils are equal, round, and reactive to light.   Cardiovascular:      Rate and Rhythm: Normal rate and regular rhythm.   Pulmonary:      Breath sounds: No wheezing or rales.   Abdominal:      Comments: Bowel sounds present. Chronic abdominal wound with fibrinous material adjacent to the wound.    Musculoskeletal:         General: No swelling. Normal range of motion.      Cervical back: Neck supple.   Skin:     General: Skin is warm.      Comments: Sacral wound through to fat, noted on admission.    Neurological:      General: No focal deficit present.      Mental Status: She is alert and oriented to person, place, and time.       Significant Labs: All pertinent labs within the past 24 hours have been reviewed.    Significant Imaging: I have  reviewed all pertinent imaging results/findings within the past 24 hours.

## 2022-07-21 NOTE — PROGRESS NOTES
Pharmacokinetic Assessment Follow Up: IV Vancomycin    Vancomycin serum concentration assessment(s):    The trough level was drawn correctly and can be used to guide therapy at this time. The measurement is within the desired definitive target range of 10 to 20 mcg/mL.    Vancomycin Regimen Plan:    Continue regimen to Vancomycin 750 mg IV every 12 hours with next serum trough concentration measured at 1630 prior to 4th dose on 7/22/22    Drug levels (last 3 results):  Recent Labs   Lab Result Units 07/21/22  0429   Vancomycin-Trough ug/mL 12.5       Pharmacy will continue to follow and monitor vancomycin.    Please contact pharmacy at extension [phone] for questions regarding this assessment.    Thank you for the consult,   Arnaldo Ham       Patient brief summary:  Odette Hart is a 66 y.o. female initiated on antimicrobial therapy with IV Vancomycin for treatment of skin & soft tissue infection    The patient's current regimen is 750 mg iv q 12h    Drug Allergies:   Review of patient's allergies indicates:   Allergen Reactions    Augmentin [amoxicillin-pot clavulanate] Swelling     Not allergic to amoxicillin just a derivative of augmentin    Lisinopril Other (See Comments)     Fluid around heart    Losartan Other (See Comments)     High potassium    Shellfish containing products Anaphylaxis     Pt.states she is allergic to SEAFOOD since the age of 13    Levaquin [levofloxacin] Other (See Comments)     Very bad joint pain    Clindamycin Palpitations     Chest pain       Actual Body Weight:   52.6kg    Renal Function:   Estimated Creatinine Clearance: 77.3 mL/min (based on SCr of 0.6 mg/dL).,     Dialysis Method (if applicable):  N/A    CBC (last 72 hours):  Recent Labs   Lab Result Units 07/18/22  1300 07/19/22  0419 07/20/22  0607 07/21/22  0429   WBC K/uL 3.65* 2.50* 1.85* 2.19*   Hemoglobin g/dL 10.2* 10.0* 10.5* 10.1*   Hematocrit % 31.4* 29.9* 32.2* 30.8*   Platelets K/uL 261 262 285 293   Gran % %  2.0* 4.0*  --   --    Lymph % % 72.3* 64.8*  --   --    Mono % % 24.9* 30.4*  --   --    Eosinophil % % 0.0 0.0  --   --    Basophil % % 0.8 0.8  --   --    Differential Method  Automated Automated  --   --        Metabolic Panel (last 72 hours):  Recent Labs   Lab Result Units 07/18/22  1300 07/18/22  1322 07/19/22  0419 07/20/22  0607 07/21/22  0428   Sodium mmol/L 128*  --  131* 132* 131*   Potassium mmol/L 4.1  --  3.5 3.5 3.3*   Chloride mmol/L 91*  --  96 96 96   CO2 mmol/L 27  --  26 27 25   Glucose mg/dL 102  --  85 123* 98   Glucose, UA   --  Negative  --   --   --    BUN mg/dL 12  --  12 8 9   Creatinine mg/dL 0.7  --  0.6 0.6 0.6   Albumin g/dL 1.8*  --  1.5*  --   --    Total Bilirubin mg/dL 1.5*  --  1.5*  --   --    Alkaline Phosphatase U/L 94  --  89  --   --    AST U/L 45*  --  25  --   --    ALT U/L 22  --  18  --   --    Magnesium mg/dL 1.7  --   --   --   --        Vancomycin Administrations:  vancomycin given in the last 96 hours                     vancomycin 750 mg in dextrose 5 % 250 mL IVPB (ready to mix system) (mg) 750 mg New Bag 07/20/22 1712     750 mg New Bag  0126    vancomycin 1.25 g in dextrose 5% 250 mL IVPB (ready to mix) (mg) 1,250 mg New Bag 07/19/22 1805                    Microbiologic Results:  Microbiology Results (last 7 days)       Procedure Component Value Units Date/Time    Blood culture x two cultures. Draw prior to antibiotics. [[id number]] Collected: 07/18/22 1223    Order Status: Completed Specimen: Blood Updated: 07/20/22 2012     Blood Culture, Routine No Growth to date      No Growth to date      No Growth to date    Narrative:      Aerobic and anaerobic    Blood culture x two cultures. Draw prior to antibiotics. [[id number]] Collected: 07/18/22 1223    Order Status: Completed Specimen: Blood Updated: 07/20/22 2012     Blood Culture, Routine No Growth to date      No Growth to date      No Growth to date    Narrative:      Aerobic and anaerobic

## 2022-07-21 NOTE — ASSESSMENT & PLAN NOTE
Abdominal wall fluid collection. Symptoms thought to be from abdominal wall infection  Discussed percutaneous drainage with IR; drainage cancelled    Continue IV abx  ID following; discuss final abx recs  Repeat inflammatory markers in AM

## 2022-07-21 NOTE — ASSESSMENT & PLAN NOTE
Poorly healing sacral and abdominal wound in setting of chronic illness and malnutrition.  Sacral and abdominal wounds present on admission

## 2022-07-21 NOTE — PLAN OF CARE
Patient AAOX4 , VSS  plan of care discuss with patient verbalizes understanding no c/o pain or discomfort noted . Midline to left arm patent and infusing ABT with no adverse reaction noted . Safety maintain , call bell in reach bed lock and lowest position . Will continue to monitor   Problem: Adult Inpatient Plan of Care  Goal: Plan of Care Review  Outcome: Ongoing, Progressing  Goal: Patient-Specific Goal (Individualized)  Outcome: Ongoing, Progressing  Goal: Absence of Hospital-Acquired Illness or Injury  Outcome: Ongoing, Progressing  Goal: Optimal Comfort and Wellbeing  Outcome: Ongoing, Progressing  Goal: Readiness for Transition of Care  Outcome: Ongoing, Progressing     Problem: Impaired Wound Healing  Goal: Optimal Wound Healing  Outcome: Ongoing, Progressing     Problem: Fall Injury Risk  Goal: Absence of Fall and Fall-Related Injury  Outcome: Ongoing, Progressing     Problem: Pain Acute  Goal: Acceptable Pain Control and Functional Ability  Outcome: Ongoing, Progressing     Problem: Nausea and Vomiting  Goal: Fluid and Electrolyte Balance  Outcome: Ongoing, Progressing     Problem: Infection  Goal: Absence of Infection Signs and Symptoms  Outcome: Ongoing, Progressing     Problem: Coping Ineffective  Goal: Effective Coping  Outcome: Ongoing, Progressing

## 2022-07-21 NOTE — HPI
"(From H&P) "Ms. Pino is a 66-year-old female with a past medical history that includes adiverticulitis s/p cystoscopy with BL ureteral catheters, open sigmoid colon resection, appendectomy, and creation of end colostomy on 5/9/22.  She also has coronary artery disease with stents and cardiomyopathy, heart failure and AICD placement.  She seems wound care outpatient for sacral wound and a right lower abdomen surgical wound.  Patient came to emergency department today due to pain to the area of skin around her colostomy and weakness.  She reports she has not been eating and drinking for the past week.  She has pain to her stomach with intake of solids or liquids.  Patient also reports that she had the appearance of blood clots come out of her rectum and some blood on her underwear indicating some red blood from her vagina.  Patient does ambulate.  She has a sacral wound that she sees wound care Tuesday through Friday outpatient.  She reports she is not taking medications since last week due to her nausea.  She lives at home by herself with help from her grandson.  General surgery has been consulted due to concern for a possible abscess.  Patient admitted to hospital medicine for further management."    Palliative medicine is consulted for advance care planning and support, given her multiple chronic medical conditions. Met with pt at bedside; for details of discussion, see advance care planning section of plan.   "

## 2022-07-21 NOTE — ASSESSMENT & PLAN NOTE
Abdominal wall fluid collection spontaneously resolved. Symptoms thought to be from abdominal wall infection  Discussed percutaneous drainage with IR; drainage cancelled.    Continue IV abx until tomorrow  discontinue antibiotics in a.m. after 5 days total therapy per discussion with ID

## 2022-07-21 NOTE — ASSESSMENT & PLAN NOTE
- BMI 20.54 at time of consult, though albumin 1.5 on recent labs. Likely contributes to difficulties with wound healing.      Discussed IOL options of Standard OU VS  Custom Lensx OU. Patient elects Standard OU to proceed OS first OD later.

## 2022-07-22 NOTE — ASSESSMENT & PLAN NOTE
Not in acute exacerbation.  Continue Coreg with holding parameters per Cardiology  Hold aldactone due to blood pressure  Hold losartan due to blood pressure  Lasix per Cardiology    Borderline hypotension likely due to underlying cardiomyopathy (EF 20%, G3DD)

## 2022-07-22 NOTE — ASSESSMENT & PLAN NOTE
Nutrition Problem:  Severe Protein-Calorie Malnutrition  Malnutrition in the context of Chronic Illness/Injury    Related to (etiology):  Altered GI function/ poor appetite / chronic wounds     Signs and Symptoms (as evidenced by):  Energy Intake: <75% of estimated energy requirement for 1 month  Body Fat Depletion: severe depletion of orbitals and thoracic and lumbar region   Muscle Mass Depletion: severe depletion of temples and scapular region     Interventions(treatment strategy):  Collaboration with other healthcare providers  Regular diet  ONS    Nutrition Diagnosis Status:  Continues

## 2022-07-22 NOTE — SUBJECTIVE & OBJECTIVE
Interval History: Feels okay. No fever or chills. Feet are swollen.    Review of Systems   Constitutional:  Negative for chills and fever.   Skin:  Positive for wound.   Objective:     Vital Signs (Most Recent):  Temp: 97.8 °F (36.6 °C) (07/22/22 0757)  Pulse: 81 (07/22/22 1000)  Resp: 18 (07/22/22 0757)  BP: (!) 108/57 (07/22/22 0757)  SpO2: 100 % (07/22/22 0757)   Vital Signs (24h Range):  Temp:  [97.6 °F (36.4 °C)-98.1 °F (36.7 °C)] 97.8 °F (36.6 °C)  Pulse:  [64-84] 81  Resp:  [16-18] 18  SpO2:  [99 %-100 %] 100 %  BP: ()/(50-61) 108/57     Weight: 52.6 kg (115 lb 15.4 oz)  Body mass index is 20.54 kg/m².    Estimated Creatinine Clearance: 66.3 mL/min (based on SCr of 0.7 mg/dL).    Physical Exam  Vitals and nursing note reviewed.   Constitutional:       General: She is not in acute distress.     Appearance: Normal appearance. She is not ill-appearing, toxic-appearing or diaphoretic.   HENT:      Head: Normocephalic and atraumatic.      Right Ear: External ear normal.      Left Ear: External ear normal.   Abdominal:      General: There is distension.      Comments: Wound clean but deep   Neurological:      General: No focal deficit present.      Mental Status: She is alert and oriented to person, place, and time. Mental status is at baseline.   Psychiatric:         Mood and Affect: Mood normal.         Behavior: Behavior normal.         Thought Content: Thought content normal.         Judgment: Judgment normal.       Significant Labs: Blood Culture:   Recent Labs   Lab 02/25/22  1710 03/15/22  1254 07/18/22  1223   LABBLOO No growth after 5 days.  No growth after 5 days. No growth after 5 days. No Growth to date  No Growth to date  No Growth to date  No Growth to date  No Growth to date  No Growth to date  No Growth to date  No Growth to date     CBC:   Recent Labs   Lab 07/21/22  0429 07/22/22  0432   WBC 2.19* 2.10*   HGB 10.1* 9.6*   HCT 30.8* 30.4*    301     CMP:   Recent Labs   Lab  07/21/22  0428 07/22/22  0432   * 133*   K 3.3* 3.1*   CL 96 98   CO2 25 26   GLU 98 110   BUN 9 8   CREATININE 0.6 0.7   CALCIUM 7.8* 7.7*   ANIONGAP 10 9   EGFRNONAA >60 >60     Microbiology Results (last 7 days)       Procedure Component Value Units Date/Time    Blood culture x two cultures. Draw prior to antibiotics. [[id number]] Collected: 07/18/22 1223    Order Status: Completed Specimen: Blood Updated: 07/21/22 2012     Blood Culture, Routine No Growth to date      No Growth to date      No Growth to date      No Growth to date    Narrative:      Aerobic and anaerobic    Blood culture x two cultures. Draw prior to antibiotics. [[id number]] Collected: 07/18/22 1223    Order Status: Completed Specimen: Blood Updated: 07/21/22 2012     Blood Culture, Routine No Growth to date      No Growth to date      No Growth to date      No Growth to date    Narrative:      Aerobic and anaerobic          Respiratory Culture: No results for input(s): GSRESP, RESPIRATORYC in the last 4320 hours.    Significant Imaging: I have reviewed all pertinent imaging results/findings within the past 24 hours.

## 2022-07-22 NOTE — ASSESSMENT & PLAN NOTE
- neutropenia has resolved  - no new culture data  - will stop vancomycin  - would she benefit from a vac?

## 2022-07-22 NOTE — PROGRESS NOTES
[facility] Medicine  Progress Note    Patient Name: Odette Hart  MRN: [id number]  Patient Class: IP- Inpatient   Admission Date: 7/18/2022  Length of Stay: 4 days  Attending Physician: Brandon Anderson MD  Primary Care Provider: Braxton Barragan MD        Subjective:     Principal Problem:Abdominal wall abscess        HPI:  Ms. Pino is a 66-year-old female with a past medical history that includes adiverticulitis s/p cystoscopy with BL ureteral catheters, open sigmoid colon resection, appendectomy, and creation of end colostomy on 5/9/22.  She also has coronary artery disease with stents and cardiomyopathy, heart failure and AICD placement.  She seems wound care outpatient for sacral wound and a right lower abdomen surgical wound.  Patient came to emergency department today due to pain to the area of skin around her colostomy and weakness.  She reports she has not been eating and drinking for the past week.  She has pain to her stomach with intake of solids or liquids.  Patient also reports that she had the appearance of blood clots come out of her rectum and some blood on her underwear indicating some red blood from her vagina.  Patient does ambulate.  She has a sacral wound that she sees wound care Tuesday through Friday outpatient.  She reports she is not taking medications since last week due to her nausea.  She lives at home by herself with help from her grandson.  General surgery has been consulted due to concern for a possible abscess.  Patient admitted to hospital medicine for further management.      Overview/Hospital Course:  Chronically ill patient with malnutrition and nonhealing open abdominal wound. Thought to have abdominal wall soft tissue infection. Fluid collection thought to be the midline of the abdominal wall was found to be resolved on repeat CT scan.  No fluid was aspirated.  Patient was treated for 5 days with IV antibiotics.  There was discussion about  placing a wound VAC, but the patient has retained foreign material (sutures) with rounded wound edges and is not a candidate for a wound VAC (this was discussed with Wound Care and General surgery).      Interval History:  Fluid collection spontaneously resolved.  No new complaints.  She declines repeat visit from palliative Care today.    Review of Systems   Constitutional:  Negative for chills, fatigue and fever.   HENT:  Negative for congestion and ear pain.    Respiratory:  Negative for shortness of breath.    Cardiovascular:  Negative for chest pain.   Musculoskeletal:  Negative for back pain.   Skin:  Positive for wound.   Objective:     Vital Signs (Most Recent):  Temp: 97.6 °F (36.4 °C) (07/22/22 1146)  Pulse: 86 (07/22/22 1400)  Resp: 18 (07/22/22 1357)  BP: (!) 102/59 (07/22/22 1347)  SpO2: (!) 94 % (07/22/22 1146)   Vital Signs (24h Range):  Temp:  [97.6 °F (36.4 °C)-98.1 °F (36.7 °C)] 97.6 °F (36.4 °C)  Pulse:  [64-86] 86  Resp:  [16-18] 18  SpO2:  [94 %-100 %] 94 %  BP: ()/(41-60) 102/59     Weight: 52.6 kg (115 lb 15.4 oz)  Body mass index is 20.54 kg/m².    Intake/Output Summary (Last 24 hours) at 7/22/2022 1509  Last data filed at 7/22/2022 0800  Gross per 24 hour   Intake 1809.78 ml   Output 1450 ml   Net 359.78 ml        Physical Exam  Vitals reviewed.   Constitutional:       General: She is not in acute distress.     Comments: Chronically ill appearing   HENT:      Head: Normocephalic.   Eyes:      Pupils: Pupils are equal, round, and reactive to light.   Cardiovascular:      Rate and Rhythm: Normal rate and regular rhythm.   Pulmonary:      Breath sounds: No wheezing or rales.   Abdominal:      Comments: Bowel sounds present. Chronic abdominal wound with fibrinous material adjacent to the wound.    Musculoskeletal:         General: No swelling. Normal range of motion.      Cervical back: Neck supple.   Skin:     General: Skin is warm.      Comments: Sacral wound through to fat, noted on  admission.    Neurological:      General: No focal deficit present.      Mental Status: She is alert and oriented to person, place, and time.       Significant Labs: All pertinent labs within the past 24 hours have been reviewed.    Significant Imaging: I have reviewed all pertinent imaging results/findings within the past 24 hours.      Assessment/Plan:      * Abdominal wall abscess  Abdominal wall fluid collection spontaneously resolved. Symptoms thought to be from abdominal wall infection  Discussed percutaneous drainage with IR; drainage cancelled.    Continue IV abx until tomorrow  discontinue antibiotics in a.m. after 5 days total therapy per discussion with ID          Advance care planning  Patient declining further palliative care visits      Wound infection after surgery        Severe protein-calorie malnutrition  Albumin 1.8. Pt with poor intake. Nutritionist consulted.       COPD (chronic obstructive pulmonary disease)  Not in exacerbation. Unable to locate PFTs. Pt is a former smoker. PRN duo-nebs ordered.      CAD (coronary artery disease)  Continue aspirin  Continue plavix  Continue ezetimibe      Hypotension  Relative hypotension in the setting of malnutrition and abdominal wall infection    Check AM cortisol      Weakness  Continue home health upon discharge      Hyponatremia  Improving. Pt is asymptomatic.      Alteration in skin integrity in adult  Poorly healing sacral and abdominal wound in setting of chronic illness and malnutrition. Sacral and abdominal wounds present on admission.  Patient is not a candidate for abdominal wound VAC as patient has retained foreign material (sutures) within the abdominal wound.  This was discussed with Wound Care and General surgery.      Mood disorder        Colovesical fistula  General surgery following      History of diverticulitis             Chronic congestive heart failure  Not in acute exacerbation.  Continue Coreg with holding parameters per  Cardiology  Hold aldactone due to blood pressure  Hold losartan due to blood pressure  Lasix per Cardiology    Borderline hypotension likely due to underlying cardiomyopathy (EF 20%, G3DD)        VTE Risk Mitigation (From admission, onward)         Ordered     IP VTE HIGH RISK PATIENT  Once         07/18/22 1652     Place sequential compression device  Until discontinued         07/18/22 1652                Discharge Planning   SIVAN:      Code Status: Full Code   Is the patient medically ready for discharge?:     Reason for patient still in hospital (select all that apply): Treatment and Pending disposition  Discharge Plan A: Home      Anticipate discharge home with home health tomorrow after completing antibiotics            Brandon Anderson MD  Department of Hospital Medicine   [facility] ([facility])

## 2022-07-22 NOTE — PROGRESS NOTES
House day 4.  Status post laparotomy with sigmoid colectomy, Landry's pouch, end colostomy, drainage of pelvic abscess and [facility] in April of this year  Upper midline wound dehiscence    Subjective  Feeling better  Tolerating regular diet  Colostomy functioning    PE  Abdomen-soft, nontender, stoma functioning, some expected erythema around wound edges, sutures in depths of wound    Impression/plan  Discussed with wound care, continue with daily local care, do not feel that VAC drainage system necessary  Would leave sutures at base of wound, fear removal would result in fascial dehiscence.

## 2022-07-22 NOTE — PROGRESS NOTES
"[facility]  Infectious Disease  Progress Note    Patient Name: Odette Hart  MRN: [id number]  Admission Date: 7/18/2022  Length of Stay: 4 days  Attending Physician: Brandon Anderson MD  Primary Care Provider: Braxton Barragan MD    Isolation Status: No active isolations     Assessment/Plan:      Wound infection after surgery  - neutropenia has resolved  - no new culture data  - will stop vancomycin  - would she benefit from a vac?      Lucio Perez MD  Infectious Disease  [facility])    Subjective:     Principal Problem:Abdominal wall abscess    HPI: 65 yo female with Hx of colovaginal/vesico fistula, abscess, secondary to diverticulitis and now cholecystitis s/p cholecystectomy, appendectomy, fistula repair, and sigmoid colectomy 5/9. Developed wound dehiscense and subjective fever. Was on abx x 1 month. Admitted yesterday. CT imaging reviewed. ID is consulted for "dehiscent abdominal wound."    Interval History: Feels okay. No fever or chills. Feet are swollen.    Review of Systems   Constitutional:  Negative for chills and fever.   Skin:  Positive for wound.   Objective:     Vital Signs (Most Recent):  Temp: 97.8 °F (36.6 °C) (07/22/22 0757)  Pulse: 81 (07/22/22 1000)  Resp: 18 (07/22/22 0757)  BP: (!) 108/57 (07/22/22 0757)  SpO2: 100 % (07/22/22 0757)   Vital Signs (24h Range):  Temp:  [97.6 °F (36.4 °C)-98.1 °F (36.7 °C)] 97.8 °F (36.6 °C)  Pulse:  [64-84] 81  Resp:  [16-18] 18  SpO2:  [99 %-100 %] 100 %  BP: ()/(50-61) 108/57     Weight: 52.6 kg (115 lb 15.4 oz)  Body mass index is 20.54 kg/m².    Estimated Creatinine Clearance: 66.3 mL/min (based on SCr of 0.7 mg/dL).    Physical Exam  Vitals and nursing note reviewed.   Constitutional:       General: She is not in acute distress.     Appearance: Normal appearance. She is not ill-appearing, toxic-appearing or diaphoretic.   HENT:      Head: Normocephalic and atraumatic.      Right Ear: External ear " normal.      Left Ear: External ear normal.   Abdominal:      General: There is distension.      Comments: Wound clean but deep   Neurological:      General: No focal deficit present.      Mental Status: She is alert and oriented to person, place, and time. Mental status is at baseline.   Psychiatric:         Mood and Affect: Mood normal.         Behavior: Behavior normal.         Thought Content: Thought content normal.         Judgment: Judgment normal.       Significant Labs: Blood Culture:   Recent Labs   Lab 02/25/22  1710 03/15/22  1254 07/18/22  1223   LABBLOO No growth after 5 days.  No growth after 5 days. No growth after 5 days. No Growth to date  No Growth to date  No Growth to date  No Growth to date  No Growth to date  No Growth to date  No Growth to date  No Growth to date     CBC:   Recent Labs   Lab 07/21/22  0429 07/22/22  0432   WBC 2.19* 2.10*   HGB 10.1* 9.6*   HCT 30.8* 30.4*    301     CMP:   Recent Labs   Lab 07/21/22  0428 07/22/22  0432   * 133*   K 3.3* 3.1*   CL 96 98   CO2 25 26   GLU 98 110   BUN 9 8   CREATININE 0.6 0.7   CALCIUM 7.8* 7.7*   ANIONGAP 10 9   EGFRNONAA >60 >60     Microbiology Results (last 7 days)       Procedure Component Value Units Date/Time    Blood culture x two cultures. Draw prior to antibiotics. [[id number]] Collected: 07/18/22 1223    Order Status: Completed Specimen: Blood Updated: 07/21/22 2012     Blood Culture, Routine No Growth to date      No Growth to date      No Growth to date      No Growth to date    Narrative:      Aerobic and anaerobic    Blood culture x two cultures. Draw prior to antibiotics. [[id number]] Collected: 07/18/22 1223    Order Status: Completed Specimen: Blood Updated: 07/21/22 2012     Blood Culture, Routine No Growth to date      No Growth to date      No Growth to date      No Growth to date    Narrative:      Aerobic and anaerobic          Respiratory Culture: No results for input(s): GSRESP, RESPIRATORYC in the  last 4320 hours.    Significant Imaging: I have reviewed all pertinent imaging results/findings within the past 24 hours.

## 2022-07-22 NOTE — PLAN OF CARE
Recommendations  1. Continue regular diet as tolerated.    - consider adding [employer] Peptide 1.5 TID if PO intake <50%.   2.Continue Ezra BID mix with 8 fl oz of fluid of choice to promote wound healing.   3.RD to monitor diet advancement, tolerance, wt changes, and labs.    Goals: Diet to advance by RD f/u.  Nutrition Goal Status: goal met  Communication of RD Recs:  (POC)

## 2022-07-22 NOTE — CONSULTS
[facility]    Date of admission:  7/18/2022     Reason for Consult:    Heart failure    Chief Complaint    Chief Complaint   Patient presents with    Vomiting     N/v x 1 week with leaking from colostomy and fatigue x 1 week.        HPI:    This 66-year-old female with advanced ischemic cardiomyopathy came to the emergency department because of concerns about an abdominal wound.  She offers no cardiac complaints today.  She reports that her lower extremity edema is better than usual.  She denies paroxysmal nocturnal dyspnea and orthopnea.  Has been compliant with medications as an outpatient.  No exertional chest discomfort or dyspnea.    Medications  Current Facility-Administered Medications   Medication Dose Route Frequency Provider Last Rate Last Admin    acetaminophen tablet 650 mg  650 mg Oral Q6H PRN Martha Khan DNP        albuterol-ipratropium 2.5 mg-0.5 mg/3 mL nebulizer solution 3 mL  3 mL Nebulization Q6H PRN Martha Khan DNP        aspirin EC tablet 81 mg  81 mg Oral Daily Martha Khan DNP   81 mg at 07/22/22 0945    cefepime in dextrose 5 % IVPB 2 g  2 g Intravenous Q8H Lucio Perez MD   Stopped at 07/22/22 1024    clopidogreL tablet 75 mg  75 mg Oral Daily Martha Khan DNP   75 mg at 07/22/22 0949    collagenase ointment   Topical (Top) Daily Brandon Anderson MD   Given at 07/22/22 0951    docusate sodium capsule 100 mg  100 mg Oral BID Martha Khan DNP   100 mg at 07/22/22 0945    fluticasone propionate 50 mcg/actuation nasal spray 100 mcg  2 spray Each Nostril Daily Martha Hameed PA-C   100 mcg at 07/22/22 0951    HYDROcodone-acetaminophen 5-325 mg per tablet 1 tablet  1 tablet Oral Q6H PRN Martha Khan DNP   1 tablet at 07/22/22 0401    melatonin tablet 6 mg  6 mg Oral Nightly PRN Lea Watkins MD        metronidazole IVPB 500 mg  500 mg Intravenous Q8H Brandon Anderson MD   Stopped at 07/22/22 5718     ondansetron injection 4 mg  4 mg Intravenous Q6H PRN Martha Khan DNP   4 mg at 07/21/22 2239    pantoprazole EC tablet 40 mg  40 mg Oral Daily Martha Khan DNP   40 mg at 07/22/22 0900    promethazine (PHENERGAN) 12.5 mg in dextrose 5 % 50 mL IVPB  12.5 mg Intravenous Q6H PRN Martha Khan DNP        simethicone chewable tablet 80 mg  80 mg Oral Q8H PRN Martha Khan DNP   80 mg at 07/21/22 2044    sodium chloride 0.9% flush 10 mL  10 mL Intravenous PRN Lea Watkins MD          Prior to Admission medications    Medication Sig Start Date End Date Taking? Authorizing Provider   acetaminophen (TYLENOL) 500 MG tablet Take 2 tablets (1,000 mg total) by mouth every 8 (eight) hours as needed for Pain. 1/26/22  Yes Nghia Hunt MD   albuterol (PROVENTIL/VENTOLIN HFA) 90 mcg/actuation inhaler Inhale 2 puffs into the lungs every 6 (six) hours as needed for Wheezing. Rescue 8/2/21 8/2/22 Yes Scout Smith MD   ALPRAZolam (XANAX) 0.5 MG tablet Take 1 tablet (0.5 mg total) by mouth nightly as needed for Anxiety. 6/30/22  Yes Braxton Barragan MD   aspirin (ECOTRIN) 81 MG EC tablet Take 1 tablet (81 mg total) by mouth once daily. 1/26/22  Yes Nghia Hunt MD   carvediloL (COREG) 3.125 MG tablet Take 1 tablet (3.125 mg total) by mouth 2 (two) times daily with meals. 6/24/22  Yes Sanjuanita Briones MD   clopidogreL (PLAVIX) 75 mg tablet Take 1 tablet (75 mg total) by mouth once daily. 6/24/22  Yes Sanjuanita Briones MD   collagenase (SANTYL) ointment Apply topically once daily. 6/24/22  Yes Sanjuanita Briones MD   docusate sodium (COLACE) 100 MG capsule Take 1 capsule (100 mg total) by mouth 2 (two) times daily. 6/24/22  Yes Sanjuanita Briones MD   esomeprazole (NEXIUM) 40 MG capsule Take 1 capsule (40 mg total) by mouth before breakfast. 6/30/22 6/30/23 Yes Braxton Barragan MD   ezetimibe (ZETIA) 10 mg tablet Take 1 tablet (10 mg total) by mouth every evening. 6/24/22 6/24/23 Yes Sanjuanita Briones MD    fluticasone propionate (FLONASE) 50 mcg/actuation nasal spray 1 spray (50 mcg total) by Each Nostril route once daily. 8/2/21  Yes Scout Smith MD   furosemide (LASIX) 20 MG tablet Take 1 tablet (20 mg total) by mouth once daily. 6/24/22  Yes Sanjuanita Briones MD   ondansetron (ZOFRAN-ODT) 8 MG TbDL Take 1 tablet (8 mg total) by mouth every 12 (twelve) hours as needed (nausea). 6/30/22  Yes Braxton Barragan MD   spironolactone (ALDACTONE) 25 MG tablet Take half a tablet (12.5 mg total) by mouth once daily. 6/24/22  Yes Sanjuanita Briones MD   traMADoL (ULTRAM) 50 mg tablet Take 1 tablet (50 mg total) by mouth every 8 (eight) hours as needed for Pain. 6/30/22  Yes Braxton Barragan MD   nitroGLYCERIN (NITROSTAT) 0.4 MG SL tablet Place 1 tablet (0.4 mg total) under the tongue every 5 (five) minutes as needed for Chest pain. 1/26/22 1/26/23  Nghai Hunt MD   polyethylene glycol (GLYCOLAX) 17 gram/dose powder Take 17 g by mouth once daily. 6/24/22   Sanjuanita Briones MD   simethicone (MYLICON) 80 MG chewable tablet Take 1 tablet (80 mg total) by mouth every 8 (eight) hours as needed for Flatulence. 6/30/22   Braxton Barragan MD   metoprolol succinate (TOPROL-XL) 25 MG 24 hr tablet Take 1 tablet (25 mg total) by mouth once daily. 5/20/22 5/31/22  Carola Buckley NP   pantoprazole (PROTONIX) 40 MG tablet Take 1 tablet (40 mg total) by mouth once daily. 6/7/22 6/23/22  Braxton Barragan MD   prasugreL (EFFIENT) 10 mg Tab Take 1 tablet (10 mg total) by mouth once daily. 1/27/22 5/31/22  Nghia Hunt MD       History  Past Medical History:   Diagnosis Date    Acute coronary syndrome     Acute exacerbation of chronic obstructive pulmonary disease (COPD) 3/23/2022    Allergy     Arthritis     Cardiomyopathy     CHF (congestive heart failure)     Coronary artery disease     Coronary artery disease of native artery of native heart with stable angina pectoris 4/19/2021 1/24/2022: [facility]: Cath: LAD: Proximal stent  patent. LCX: Proximal 80%. LCX: Dominant. Moderate disease. LCX: HEVER 2.75 x 18 mm. Distal dissection. HEVER 2.75 x 22 mm.     Diverticulitis     Diverticulosis     Familial hypercholesterolemia 2022    Former smoker 2022    Heart attack     Heart disease     History of myocardial infarction 2022    Hyperlipidemia     Hypertension     Hypertension 2022    ICD (implantable cardioverter-defibrillator) in place     Non-ST elevation myocardial infarction (NSTEMI) 2019     Past Surgical History:   Procedure Laterality Date    APPENDECTOMY N/A 2022    Procedure: APPENDECTOMY;  Surgeon: Rafa Cardozo MD;  Location: [facility] OR 2ND FLR;  Service: Colon and Rectal;  Laterality: N/A;    ATRIAL CARDIAC PACEMAKER INSERTION      CECECTOMY N/A 2022    Procedure: EXCISION, CECUM;  Surgeon: Rafa Cardozo MD;  Location: [facility] OR 2ND FLR;  Service: Colon and Rectal;  Laterality: N/A;     SECTION      CHOLECYSTECTOMY N/A 2022    Procedure: CHOLECYSTECTOMY;  Surgeon: Doug Epstein MD;  Location: [facility] OR 2ND FLR;  Service: General;  Laterality: N/A;    COLONOSCOPY N/A 2022    Procedure: COLONOSCOPY;  Surgeon: Rafa Cardozo MD;  Location: [facility] OR 2ND FLR;  Service: Colon and Rectal;  Laterality: N/A;    COLOSTOMY  2022    Procedure: CREATION, COLOSTOMY;  Surgeon: Rafa Cardozo MD;  Location: [facility] OR 2ND FLR;  Service: Colon and Rectal;;    CORONARY STENT PLACEMENT      LEFT HEART CATHETERIZATION Left 2022    Procedure: CATHETERIZATION, HEART, LEFT;  Surgeon: Yohannes Cordova MD;  Location: [facility] CATH LAB;  Service: Cardiology;  Laterality: Left;     Social History     Socioeconomic History    Marital status:    Tobacco Use    Smoking status: Former Smoker     Packs/day: 0.50     Start date: 1976     Quit date: 2012     Years since quittin.6    Smokeless tobacco: Never Used   Substance and Sexual Activity    Alcohol use:  No    Drug use: No     Social Determinants of Health     Financial Resource Strain: Low Risk     Difficulty of Paying Living Expenses: Not hard at all   Food Insecurity: No Food Insecurity    Worried About Running Out of Food in the Last Year: Never true    Ran Out of Food in the Last Year: Never true   Transportation Needs: No Transportation Needs    Lack of Transportation (Medical): No    Lack of Transportation (Non-Medical): No   Physical Activity: Inactive    Days of Exercise per Week: 0 days    Minutes of Exercise per Session: 0 min   Stress: No Stress Concern Present    Feeling of Stress : Not at all   Social Connections: Socially Isolated    Frequency of Communication with Friends and Family: More than three times a week    Frequency of Social Gatherings with Friends and Family: Once a week    Attends [employer] Services: Never    Active Member of Clubs or Organizations: No    Attends Club or Organization Meetings: Never    Marital Status:    Housing Stability: Low Risk     Unable to Pay for Housing in the Last Year: No    Number of Places Lived in the Last Year: 1    Unstable Housing in the Last Year: No     Family History   Problem Relation Age of Onset    Heart disease Mother     Emphysema Father     Heart attack Brother         Allergies  Review of patient's allergies indicates:   Allergen Reactions    Augmentin [amoxicillin-pot clavulanate] Swelling     Not allergic to amoxicillin just a derivative of augmentin    Lisinopril Other (See Comments)     Fluid around heart    Losartan Other (See Comments)     High potassium    Shellfish containing products Anaphylaxis     Pt.states she is allergic to SEAFOOD since the age of 13    Levaquin [levofloxacin] Other (See Comments)     Very bad joint pain    Clindamycin Palpitations     Chest pain       Review of Systems   Review of Systems   Constitutional: Negative for malaise/fatigue, weight gain and weight loss.   Eyes: Negative  for visual disturbance.   Cardiovascular: Positive for leg swelling. Negative for chest pain, claudication, cyanosis, dyspnea on exertion, irregular heartbeat, near-syncope, orthopnea, palpitations, paroxysmal nocturnal dyspnea and syncope.   Respiratory: Negative for cough, hemoptysis, shortness of breath, sleep disturbances due to breathing and wheezing.    Hematologic/Lymphatic: Negative for bleeding problem. Does not bruise/bleed easily.   Skin: Positive for poor wound healing.   Musculoskeletal: Negative for muscle cramps and myalgias.   Gastrointestinal: Negative for abdominal pain, diarrhea, heartburn, hematemesis, hematochezia, melena, nausea and vomiting.   Genitourinary: Negative for hematuria and nocturia.   Neurological: Negative for excessive daytime sleepiness, dizziness, focal weakness, light-headedness and weakness.       Physical Exam    Temp:  [97.6 °F (36.4 °C)-97.9 °F (36.6 °C)]   Pulse:  [64-81]   Resp:  [18]   BP: ()/(41-60)   SpO2:  [94 %-100 %]    Wt Readings from Last 1 Encounters:   07/18/22 52.6 kg (115 lb 15.4 oz)     Physical Exam  Vitals and nursing note reviewed.   Constitutional:       General: She is not in acute distress.     Appearance: She is not toxic-appearing or diaphoretic.   HENT:      Head: Normocephalic and atraumatic.      Mouth/Throat:      Lips: Pink.      Mouth: Mucous membranes are moist.   Eyes:      General: No scleral icterus.     Conjunctiva/sclera: Conjunctivae normal.   Neck:      Thyroid: No thyromegaly.      Vascular: No carotid bruit, hepatojugular reflux or JVD.      Trachea: Trachea normal.   Cardiovascular:      Rate and Rhythm: Normal rate and regular rhythm.  No extrasystoles are present.     Pulses:           Carotid pulses are 2+ on the right side and 2+ on the left side.       Radial pulses are 2+ on the right side and 2+ on the left side.      Heart sounds: S1 normal and S2 normal. No murmur heard.    No friction rub. Gallop present. S4 sounds  present. No S3 sounds.   Pulmonary:      Effort: Pulmonary effort is normal. No accessory muscle usage or respiratory distress.      Breath sounds: Normal breath sounds and air entry. No decreased breath sounds, wheezing, rhonchi or rales.   Chest:      Comments: Left subclavicular defibrillator site is well healed.  Abdominal:      General: Bowel sounds are normal. There is no distension or abdominal bruit.      Palpations: Abdomen is soft. There is no hepatomegaly, splenomegaly or pulsatile mass.   Musculoskeletal:         General: No tenderness or deformity.      Right lower leg: Edema present.      Left lower leg: Edema present.   Skin:     General: Skin is warm and dry.      Capillary Refill: Capillary refill takes less than 2 seconds.      Coloration: Skin is not cyanotic or pale.      Nails: There is no clubbing.   Neurological:      General: No focal deficit present.      Mental Status: She is alert and oriented to person, place, and time.   Psychiatric:         Attention and Perception: Attention normal.         Mood and Affect: Mood normal.         Speech: Speech normal.         Behavior: Behavior normal. Behavior is cooperative.         EKG  Sinus rhythm.  Left axis deviation.  Incomplete right bundle-branch block.  Anterolateral infarct, age undetermined.  No significant change compared to prior electrocardiograms.    Echocardiogram 5/24/2022  · The left ventricle is severely enlarged with eccentric hypertrophy and severely decreased systolic function.  · Severe left atrial enlargement.  · Grade III left ventricular diastolic dysfunction.  · Normal right ventricular size with normal right ventricular systolic function.  · Mild-to-moderate mitral regurgitation.  · Mild tricuspid regurgitation.  · There is mild pulmonary hypertension.    Labs  Recent Results (from the past 72 hour(s))   Basic Metabolic Panel    Collection Time: 07/20/22  6:07 AM   Result Value Ref Range    Sodium 132 (L) 136 - 145 mmol/L     Potassium 3.5 3.5 - 5.1 mmol/L    Chloride 96 95 - 110 mmol/L    CO2 27 23 - 29 mmol/L    Glucose 123 (H) 70 - 110 mg/dL    BUN 8 8 - 23 mg/dL    Creatinine 0.6 0.5 - 1.4 mg/dL    Calcium 8.0 (L) 8.7 - 10.5 mg/dL    Anion Gap 9 8 - 16 mmol/L    eGFR if African American >60 >60 mL/min/1.73 m^2    eGFR if non African American >60 >60 mL/min/1.73 m^2   CBC Without Differential    Collection Time: 07/20/22  6:07 AM   Result Value Ref Range    WBC 1.85 (LL) 3.90 - 12.70 K/uL    RBC 3.95 (L) 4.00 - 5.40 M/uL    Hemoglobin 10.5 (L) 12.0 - 16.0 g/dL    Hematocrit 32.2 (L) 37.0 - 48.5 %    MCV 82 82 - 98 fL    MCH 26.6 (L) 27.0 - 31.0 pg    MCHC 32.6 32.0 - 36.0 g/dL    RDW 18.2 (H) 11.5 - 14.5 %    Platelets 285 150 - 450 K/uL    MPV 10.4 9.2 - 12.9 fL   Basic Metabolic Panel    Collection Time: 07/21/22  4:28 AM   Result Value Ref Range    Sodium 131 (L) 136 - 145 mmol/L    Potassium 3.3 (L) 3.5 - 5.1 mmol/L    Chloride 96 95 - 110 mmol/L    CO2 25 23 - 29 mmol/L    Glucose 98 70 - 110 mg/dL    BUN 9 8 - 23 mg/dL    Creatinine 0.6 0.5 - 1.4 mg/dL    Calcium 7.8 (L) 8.7 - 10.5 mg/dL    Anion Gap 10 8 - 16 mmol/L    eGFR if African American >60 >60 mL/min/1.73 m^2    eGFR if non African American >60 >60 mL/min/1.73 m^2   Protime-INR    Collection Time: 07/21/22  4:28 AM   Result Value Ref Range    Prothrombin Time 12.5 9.0 - 12.5 sec    INR 1.2 0.8 - 1.2   CBC Without Differential    Collection Time: 07/21/22  4:29 AM   Result Value Ref Range    WBC 2.19 (L) 3.90 - 12.70 K/uL    RBC 3.75 (L) 4.00 - 5.40 M/uL    Hemoglobin 10.1 (L) 12.0 - 16.0 g/dL    Hematocrit 30.8 (L) 37.0 - 48.5 %    MCV 82 82 - 98 fL    MCH 26.9 (L) 27.0 - 31.0 pg    MCHC 32.8 32.0 - 36.0 g/dL    RDW 18.4 (H) 11.5 - 14.5 %    Platelets 293 150 - 450 K/uL    MPV 10.0 9.2 - 12.9 fL   VANCOMYCIN, TROUGH    Collection Time: 07/21/22  4:29 AM   Result Value Ref Range    Vancomycin-Trough 12.5 10.0 - 22.0 ug/mL   Basic Metabolic Panel    Collection Time:  07/22/22  4:32 AM   Result Value Ref Range    Sodium 133 (L) 136 - 145 mmol/L    Potassium 3.1 (L) 3.5 - 5.1 mmol/L    Chloride 98 95 - 110 mmol/L    CO2 26 23 - 29 mmol/L    Glucose 110 70 - 110 mg/dL    BUN 8 8 - 23 mg/dL    Creatinine 0.7 0.5 - 1.4 mg/dL    Calcium 7.7 (L) 8.7 - 10.5 mg/dL    Anion Gap 9 8 - 16 mmol/L    eGFR if African American >60 >60 mL/min/1.73 m^2    eGFR if non African American >60 >60 mL/min/1.73 m^2   CBC Without Differential    Collection Time: 07/22/22  4:32 AM   Result Value Ref Range    WBC 2.10 (L) 3.90 - 12.70 K/uL    RBC 3.66 (L) 4.00 - 5.40 M/uL    Hemoglobin 9.6 (L) 12.0 - 16.0 g/dL    Hematocrit 30.4 (L) 37.0 - 48.5 %    MCV 83 82 - 98 fL    MCH 26.2 (L) 27.0 - 31.0 pg    MCHC 31.6 (L) 32.0 - 36.0 g/dL    RDW 19.0 (H) 11.5 - 14.5 %    Platelets 301 150 - 450 K/uL    MPV 9.7 9.2 - 12.9 fL   C-reactive protein    Collection Time: 07/22/22  4:32 AM   Result Value Ref Range    CRP 50.2 (H) 0.0 - 8.2 mg/L   Procalcitonin    Collection Time: 07/22/22  4:32 AM   Result Value Ref Range    Procalcitonin 0.05 <0.25 ng/mL        Imaging  CT Abdomen Pelvis With Contrast    Result Date: 7/18/2022  EXAMINATION: CT ABDOMEN PELVIS WITH CONTRAST CLINICAL HISTORY: Abdominal abscess/infection suspected; TECHNIQUE: Low dose axial images, sagittal and coronal reformations were obtained from the lung bases to the pubic symphysis following the IV administration of 75 mL of Omnipaque 350. COMPARISON: 06/06/2022 FINDINGS: The lung bases show small pleural effusions, right greater than left, similar compared to prior.  Persistent reticular opacities at the lung bases which can be seen with mild edema or underlying interstitial lung disease. The liver is homogeneous.  Gallbladder is unremarkable.  Spleen and pancreas are unremarkable. Adrenal glands are normal.  Kidneys concentrate contrast appropriately.  The urinary bladder is unremarkable. Ectasia of the infrarenal abdominal aorta measuring 2 cm.   Atherosclerosis.  No retroperitoneal or mesenteric lymph node enlargement seen. Stomach and loops of bowel are normal caliber.  Duodenal diverticulum.  Left upper quadrant colostomy with postoperative changes at the level of the rectum.  Mild edema and trace free fluid in the pelvis, similar compared to prior. Regional skeleton shows degenerative change L5-S1.     Interval development of a collection of air and fluid along the midline incision anterior abdominal wall measuring 2.4 x 1 cm suspicious for a small developing abscess.  This is too small for percutaneous drain at this time. Prior partial colectomy with left upper quadrant colostomy.  Stable edema and trace fluid in the pelvis with no focal fluid collection seen. Small pleural effusions, right greater than left. Electronically signed by: Layla Verdugo Date:    07/18/2022 Time:    15:02    X-Ray Chest AP Portable    Result Date: 7/18/2022  EXAMINATION: XR CHEST AP PORTABLE CLINICAL HISTORY: Sepsis; TECHNIQUE: Single frontal view of the chest was performed. COMPARISON: 06/08/2022 FINDINGS: Lungs are well expanded.  No acute consolidation, pleural effusion, or pneumothorax.  Reticular opacities in both lungs unchanged. Cardiac silhouette is mildly enlarged, stable.  Left chest wall pacing device with leads in stable position     Stable chest with no acute consolidation.  Reticular opacities in both lungs unchanged.  These can be seen with edema in the acute setting or interstitial lung disease in the chronic setting. Electronically signed by: Layla Verdugo Date:    07/18/2022 Time:    13:29    Interventional Radiology    Result Date: 7/21/2022  Procedure performed in the Invasive Lab  - See Procedure Log link below for nursing documentation  - See OpNote on Surgeries Tab for physician findings  - See Imaging Tab for radiologist dictation    IR  CT Limited    Result Date: 7/21/2022  EXAMINATION: IR CT LIMITED CLINICAL HISTORY: Midline fluid collection  aspiration; TECHNIQUE: A limited abdominal ultrasound examination was performed for preprocedural planning purposes in this patient referred for aspiration of abdominal wall fluid collection.  Note that planned procedure was canceled. COMPARISON: CT abdomen and pelvis with contrast dated 07/18/2022. FINDINGS: Patient was consented for aspiration of midline anterior abdominal wall fluid collection noted on CT examination dated 07/18/2022.  On today's examination, there is gauze packing of the open midline anterior abdominal wall wound.  There is no longer fluid layering within this open anterior abdominal wall wound and there is no longer fluid identified within the previously identified collection of air and fluid within the subcutaneous tissues of the anterior abdominal wall noted on the prior examination.  The previously noted air and fluid collection likely communicates with the open midline anterior abdominal wall wound.  As there was no fluid identified to aspirate, procedure was canceled.     Scheduled aspiration of midline subcutaneous anterior abdominal wall fluid collection was canceled as fluid was no longer present within the collection.  Note that the collection likely communicates with the open anterior abdominal wall wound and the fluid has likely been absorbed by the gauze packing when compared to the prior exam. Electronically signed by: Abelardo De Leon MD Date:    07/21/2022 Time:    15:02      Assessment    Chronic systolic heart failure  Asymptomatic per probably mildly volume overloaded.    Coronary atherosclerosis  Stable and free of angina    Open abdominal wound    Plan and Discussion    Will adjust her diuretic dosing.  At hold parameters to her heart failure medications.  Will follow along.      Vince Lopez MD

## 2022-07-22 NOTE — PLAN OF CARE
Discussed patient with ID staff, Dr Perez; per his visit with patient, she is not receptive to further advance care planning visits or visits from palliative. Would suggest completing MPOA paperwork with patient. Please call if further assistance can be needed.     ELSA Tsang  Palliative Medicine Staff   [phone]

## 2022-07-22 NOTE — SUBJECTIVE & OBJECTIVE
Interval History:  Fluid collection spontaneously resolved.  No new complaints.  She declines repeat visit from palliative Care today.    Review of Systems   Constitutional:  Negative for chills, fatigue and fever.   HENT:  Negative for congestion and ear pain.    Respiratory:  Negative for shortness of breath.    Cardiovascular:  Negative for chest pain.   Musculoskeletal:  Negative for back pain.   Skin:  Positive for wound.   Objective:     Vital Signs (Most Recent):  Temp: 97.6 °F (36.4 °C) (07/22/22 1146)  Pulse: 86 (07/22/22 1400)  Resp: 18 (07/22/22 1357)  BP: (!) 102/59 (07/22/22 1347)  SpO2: (!) 94 % (07/22/22 1146)   Vital Signs (24h Range):  Temp:  [97.6 °F (36.4 °C)-98.1 °F (36.7 °C)] 97.6 °F (36.4 °C)  Pulse:  [64-86] 86  Resp:  [16-18] 18  SpO2:  [94 %-100 %] 94 %  BP: ()/(41-60) 102/59     Weight: 52.6 kg (115 lb 15.4 oz)  Body mass index is 20.54 kg/m².    Intake/Output Summary (Last 24 hours) at 7/22/2022 1509  Last data filed at 7/22/2022 0800  Gross per 24 hour   Intake 1809.78 ml   Output 1450 ml   Net 359.78 ml        Physical Exam  Vitals reviewed.   Constitutional:       General: She is not in acute distress.     Comments: Chronically ill appearing   HENT:      Head: Normocephalic.   Eyes:      Pupils: Pupils are equal, round, and reactive to light.   Cardiovascular:      Rate and Rhythm: Normal rate and regular rhythm.   Pulmonary:      Breath sounds: No wheezing or rales.   Abdominal:      Comments: Bowel sounds present. Chronic abdominal wound with fibrinous material adjacent to the wound.    Musculoskeletal:         General: No swelling. Normal range of motion.      Cervical back: Neck supple.   Skin:     General: Skin is warm.      Comments: Sacral wound through to fat, noted on admission.    Neurological:      General: No focal deficit present.      Mental Status: She is alert and oriented to person, place, and time.       Significant Labs: All pertinent labs within the past 24  hours have been reviewed.    Significant Imaging: I have reviewed all pertinent imaging results/findings within the past 24 hours.

## 2022-07-22 NOTE — PLAN OF CARE
During routine rounds and at nurse's request, assessed pt for spiritual distress, and assured pt aware  of spiritual care available.  While providing reflective listening and compassionate presence, pt concerns were explored.  Wishes for peace and healing were shared.  Of note regarding the pt as a person: she largely had questions about end of life decisions and the implications of using palliative care services .  Her questions asked were answered, and she was encouraged to have Palliative Care come back, at least to complete an MPOA.  She said she would have to think about who she would nominate and discuss this with that person before doing the p/w.  No other distress or particular spiritual care needs were reported nor presented at this time.  Pt reported and presented with relational, amna, and emotional support.  Pt reported gratitude for the spiritual wellness check.   Follow-up was offered upon request, and will also be offered at staff's discretion, should pt's conditions change, and/or if hospital admission continues significantly.

## 2022-07-22 NOTE — PROGRESS NOTES
[facility] ([facility])  Adult Nutrition  Progress Note    SUMMARY       Recommendations  1. Continue regular diet as tolerated.    - consider adding [employer] Peptide 1.5 TID if PO intake <50%.   2.Continue Ezra BID mix with 8 fl oz of fluid of choice to promote wound healing.   3.RD to monitor diet advancement, tolerance, wt changes, and labs.    Goals: Diet to advance by RD f/u.  Nutrition Goal Status: goal met  Communication of RD Recs:  (POC)    Assessment and Plan    Severe protein-calorie malnutrition  Nutrition Problem:  Severe Protein-Calorie Malnutrition  Malnutrition in the context of Chronic Illness/Injury    Related to (etiology):  Altered GI function/ poor appetite / chronic wounds     Signs and Symptoms (as evidenced by):  Energy Intake: <75% of estimated energy requirement for 1 month  Body Fat Depletion: severe depletion of orbitals and thoracic and lumbar region   Muscle Mass Depletion: severe depletion of temples and scapular region     Interventions(treatment strategy):  Collaboration with other healthcare providers  Regular diet  ONS    Nutrition Diagnosis Status:  Continues    Malnutrition Assessment  Malnutrition Type: chronic illness  Energy Intake: severe energy intake  Energy Intake (Malnutrition): less than or equal to 75% for greater than or equal to 1 month   Orbital Region (Subcutaneous Fat Loss): severe depletion  Upper Arm Region (Subcutaneous Fat Loss): mild depletion  Thoracic and Lumbar Region: severe depletion   [place]osque Region (Muscle Loss): severe depletion  Clavicle Bone Region (Muscle Loss): mild depletion  Clavicle and Acromion Bone Region (Muscle Loss): mild depletion  Scapular Bone Region (Muscle Loss): severe depletion  Dorsal Hand (Muscle Loss): mild depletion   Subcutaneous Fat Loss (Final Summary): severe protein-calorie malnutrition  Muscle Loss Evaluation (Final Summary): severe protein-calorie malnutrition      Reason for Assessment    Reason For Assessment:  "RD follow-up  Diagnosis:  (hx of diverticultiis)  Relevant Medical History:   Past Medical History:   Diagnosis Date    Acute coronary syndrome     Acute exacerbation of chronic obstructive pulmonary disease (COPD) 3/23/2022    Allergy     Arthritis     Cardiomyopathy     CHF (congestive heart failure)     Coronary artery disease     Coronary artery disease of native artery of native heart with stable angina pectoris 4/19/2021 1/24/2022: [facility]: Cath: LAD: Proximal stent patent. LCX: Proximal 80%. LCX: Dominant. Moderate disease. LCX: HEVER 2.75 x 18 mm. Distal dissection. HEVER 2.75 x 22 mm.     Diverticulitis     Diverticulosis     Familial hypercholesterolemia 1/22/2022    Former smoker 1/24/2022    Heart attack     Heart disease     History of myocardial infarction 1/24/2022    Hyperlipidemia     Hypertension     Hypertension 1/24/2022    ICD (implantable cardioverter-defibrillator) in place     Non-ST elevation myocardial infarction (NSTEMI) 2/4/2019       Interdisciplinary Rounds: did not attend  General Information Comments: 7/22- RD f/u. Now on regular diet. ONS Ezra BID ordered- hasn't been receieving. Notified kitchen. Has been tolerating diet. Ate most of omlete this a.m. Will attempt to try a small garden salad today. Still not intersted in ONS Boost. Complaints of godwin sauce/ pizza being salty- notified . No nausea. Will continue to monitor.Continues to meet criteria for malnutrtion.    Nutrition Discharge Planning: Pending medical couse- cardiac diet with ONS as tolerated    Nutrition Risk Screen    Nutrition Risk Screen: large or nonhealing wound, burn or pressure injury    Nutrition/Diet History    Factors Affecting Nutritional Intake: decreased appetite, altered gastrointestinal function, nausea/vomiting    Anthropometrics    Temp: 97.8 °F (36.6 °C)  Height Method: Stated  Height: 5' 3" (160 cm)  Height (inches): 63 in  Weight Method: Bed Scale  Weight: 52.6 kg (115 lb 15.4 " oz)  Weight (lb): 115.96 lb  Ideal Body Weight (IBW), Female: 115 lb  % Ideal Body Weight, Female (lb): 98.26 %  BMI (Calculated): 20.5  BMI Grade: 18.5-24.9 - normal    Lab/Procedures/Meds    Pertinent Labs Reviewed: reviewed  Pertinent Labs Comments: Na 133. K3.1, H/H 9.6/30.4  Pertinent Medications Reviewed: reviewed  Pertinent Medications Comments: cefeounem cllopidogrel, collagenase, docusate Na, metronidazole, pantoprazole, KCl, KCl SA CR    Estimated/Assessed Needs    Weight Used For Calorie Calculations: 52.6 kg (115 lb 15.4 oz)  Energy Calorie Requirements (kcal): 4506-5252 kcal day (23-35 kcal/kg)  Energy Need Method: Kcal/kg  Protein Requirements: 63-79 g day (1.2-1.5 g/kg wounds)  Weight Used For Protein Calculations: 52.6 kg (115 lb 15.4 oz)  Fluid Requirements (mL): per MD ordrs  Estimated Fluid Requirement Method: other (see comments) (CHF)  RDA Method (mL): 1300  CHO Requirement: 163 g day    Nutrition Prescription Ordered    Current Diet Order: regular  Oral Nutrition Supplement: Ezra BID    Evaluation of Received Nutrient/Fluid Intake    I/O: -2.05L since admit  Energy Calories Required: not meeting needs  Protein Required: not meeting needs  Fluid Required:  (per MD orders)  % Intake of Estimated Energy Needs: 50 - 75 %  % Meal Intake: 50 - 75 %    Nutrition Risk    Level of Risk/Frequency of Follow-up:  (2x weekly)     Monitor and Evaluation    Food and Nutrient Intake: energy intake, food and beverage intake  Food and Nutrient Adminstration: diet order  Knowledge/Beliefs/Attitudes: food and nutrition knowledge/skill  Physical Activity and Function: nutrition-related ADLs and IADLs  Anthropometric Measurements: weight, weight change, body mass index  Biochemical Data, Medical Tests and Procedures: glucose/endocrine profile, gastrointestinal profile, electrolyte and renal panel, inflammatory profile, lipid profile  Nutrition-Focused Physical Findings: overall appearance     Nutrition  Follow-Up    RD Follow-up?: Yes

## 2022-07-22 NOTE — PROGRESS NOTES
[facility])  Wound Care    Patient Name:  Odette Hart   MRN:  [id number]  Date: 2022  Diagnosis: Abdominal wall abscess    History:     Past Medical History:   Diagnosis Date    Acute coronary syndrome     Acute exacerbation of chronic obstructive pulmonary disease (COPD) 3/23/2022    Allergy     Arthritis     Cardiomyopathy     CHF (congestive heart failure)     Coronary artery disease     Coronary artery disease of native artery of native heart with stable angina pectoris 2021: [facility]: Cath: LAD: Proximal stent patent. LCX: Proximal 80%. LCX: Dominant. Moderate disease. LCX: HEVER 2.75 x 18 mm. Distal dissection. HEVER 2.75 x 22 mm.     Diverticulitis     Diverticulosis     Familial hypercholesterolemia 2022    Former smoker 2022    Heart attack     Heart disease     History of myocardial infarction 2022    Hyperlipidemia     Hypertension     Hypertension 2022    ICD (implantable cardioverter-defibrillator) in place     Non-ST elevation myocardial infarction (NSTEMI) 2019       Social History     Socioeconomic History    Marital status:    Tobacco Use    Smoking status: Former Smoker     Packs/day: 0.50     Start date: 1976     Quit date: 2012     Years since quittin.6    Smokeless tobacco: Never Used   Substance and Sexual Activity    Alcohol use: No    Drug use: No     Social Determinants of Health     Financial Resource Strain: Low Risk     Difficulty of Paying Living Expenses: Not hard at all   Food Insecurity: No Food Insecurity    Worried About Running Out of Food in the Last Year: Never true    Ran Out of Food in the Last Year: Never true   Transportation Needs: No Transportation Needs    Lack of Transportation (Medical): No    Lack of Transportation (Non-Medical): No   Physical Activity: Inactive    Days of Exercise per Week: 0 days    Minutes of Exercise per Session: 0 min   Stress: No Stress Concern Present    Feeling  of Stress : Not at all   Social Connections: Socially Isolated    Frequency of Communication with Friends and Family: More than three times a week    Frequency of Social Gatherings with Friends and Family: Once a week    Attends Alevism Services: Never    Active Member of Clubs or Organizations: No    Attends Club or Organization Meetings: Never    Marital Status:    Housing Stability: Low Risk     Unable to Pay for Housing in the Last Year: No    Number of Places Lived in the Last Year: 1    Unstable Housing in the Last Year: No       Precautions:     Allergies as of 07/18/2022 - Reviewed 07/18/2022   Allergen Reaction Noted    Augmentin [amoxicillin-pot clavulanate] Swelling 02/19/2018    Lisinopril Other (See Comments) 02/19/2018    Losartan Other (See Comments) 02/19/2018    Shellfish containing products Anaphylaxis 09/10/2018    Levaquin [levofloxacin] Other (See Comments) 09/18/2018    Clindamycin Palpitations 01/21/2019       WOC Assessment Details/Treatment     Follow up on abdominal wound and sacral pressure injury  Abdominal wound with yellow base and wound margin, tracts 4cm today when we could only track 2cm earlier in the week. Erythema is more extensive and feels indurated.  Sacral wound still slough filled up to 50%. Continue santyl packing.  Colostomy pouch is intact and was changed yesterday.     07/22/22 1045        Incision/Site 05/09/22 1135 Abdomen   Date First Assessed/Time First Assessed: 05/09/22 1135   Present Prior to Hospital Arrival?: Yes  Location: Abdomen   Wound Image    Incision WDL ex   Dressing Appearance Moist drainage;Intact   Drainage Amount Moderate   Drainage Characteristics/Odor Serous;No odor   Appearance Yellow;Slough;Moist  (loose clear suture in wound bed)   Periwound Area Redness;Indurated   Wound Edges Open   Wound Length (cm) 2.5 cm   Wound Width (cm) 2 cm   Wound Depth (cm) 1.5 cm   Wound Volume (cm^3) 7.5 cm^3   Wound Surface Area (cm^2) 5 cm^2   Tunneling  (depth (cm)/location) 4cm at 6 oclock   Undermining (depth (cm)/location) 1cm at 9 o'clock, 1.5cm at 3 oc'lock   Care Cleansed with:;Wound cleanser   Dressing Applied;Hydrofiber;Silicone;Gauze  (santyl)        Altered Skin Integrity 05/09/22 1630 Sacral spine Full thickness tissue loss. Base is covered by slough and/or eschar in the wound bed   Date First Assessed/Time First Assessed: 05/09/22 1630   Altered Skin Integrity Present on Admission: yes  Location: Sacral spine  Description of Altered Skin Integrity: Full thickness tissue loss. Base is covered by slough and/or eschar in the wound bed   Wound Image    Description of Altered Skin Integrity Full thickness tissue loss. Base is covered by slough and/or eschar in the wound bed   Dressing Appearance Moist drainage;Intact   Drainage Amount Moderate   Drainage Characteristics/Odor Serous   Appearance Pink;Yellow;Fibrin;Moist;Slough   Tissue loss description Full thickness   Red (%), Wound Tissue Color 50 %   Yellow (%), Wound Tissue Color 50 %   Periwound Area Redness   Wound Edges Open   Wound Length (cm) 1.2 cm   Wound Width (cm) 0.8 cm   Wound Depth (cm) 1.3 cm   Wound Volume (cm^3) 1.248 cm^3   Wound Surface Area (cm^2) 0.96 cm^2   Undermining (depth (cm)/location) 2cm   Care Cleansed with:;Wound cleanser   Dressing Hydrofiber;Silicone;Foam  (santyl)     07/22/2022

## 2022-07-22 NOTE — PLAN OF CARE
Problem: Adult Inpatient Plan of Care  Goal: Plan of Care Review  Outcome: Ongoing, Progressing     Problem: Impaired Wound Healing  Goal: Optimal Wound Healing  Outcome: Ongoing, Progressing     Problem: Fall Injury Risk  Goal: Absence of Fall and Fall-Related Injury  Outcome: Ongoing, Progressing     Problem: Pain Acute  Goal: Acceptable Pain Control and Functional Ability  Outcome: Ongoing, Progressing     Problem: Nausea and Vomiting  Goal: Fluid and Electrolyte Balance  Outcome: Ongoing, Progressing     Problem: Infection  Goal: Absence of Infection Signs and Symptoms  Outcome: Ongoing, Progressing     Problem: Coping Ineffective  Goal: Effective Coping  Outcome: Ongoing, Progressing     POC reviewed with pt who verbalized understanding. AAOx4. VSS on RA. Remains free of falls and injury. Left colostomy in place. ML abd wound C/D/I. Sacral dressing in place. IV ABX given per MAR. Up independently to bathroom. Tele monitored. Resting between care. Call light in reach and rounds made for safety. Will continue to monitor.

## 2022-07-22 NOTE — PROGRESS NOTES
SW meet with the patient to discuss her d/c. She stated that she wants to return home at d/c with outpatient wound care, as she had before. SW provided supportive  as the patient wanted to discuss all that she has been through during the last few months.

## 2022-07-23 PROBLEM — L03.311 ABDOMINAL WALL CELLULITIS: Status: ACTIVE | Noted: 2022-01-01

## 2022-07-23 NOTE — PROGRESS NOTES
[facility] Medicine  Progress Note    Patient Name: Odette Hart  MRN: [id number]  Patient Class: IP- Inpatient   Admission Date: 7/18/2022  Length of Stay: 5 days  Attending Physician: Brandon Anderson MD  Primary Care Provider: Braxton Barragan MD        Subjective:     Principal Problem:Abdominal wall cellulitis        HPI:  Ms. Pino is a 66-year-old female with a past medical history that includes adiverticulitis s/p cystoscopy with BL ureteral catheters, open sigmoid colon resection, appendectomy, and creation of end colostomy on 5/9/22.  She also has coronary artery disease with stents and cardiomyopathy, heart failure and AICD placement.  She seems wound care outpatient for sacral wound and a right lower abdomen surgical wound.  Patient came to emergency department today due to pain to the area of skin around her colostomy and weakness.  She reports she has not been eating and drinking for the past week.  She has pain to her stomach with intake of solids or liquids.  Patient also reports that she had the appearance of blood clots come out of her rectum and some blood on her underwear indicating some red blood from her vagina.  Patient does ambulate.  She has a sacral wound that she sees wound care Tuesday through Friday outpatient.  She reports she is not taking medications since last week due to her nausea.  She lives at home by herself with help from her grandson.  General surgery has been consulted due to concern for a possible abscess.  Patient admitted to hospital medicine for further management.      Overview/Hospital Course:  Chronically ill patient with malnutrition and nonhealing, open abdominal wound. She was thought to have abdominal wall soft tissue infection. A midline fluid collection seen on initial CT scan was found to have resolved on repeat CT scan. There was discussion about placing a wound VAC, but the patient has retained foreign material (sutures)  "with rounded wound edges and is not a candidate for a wound VAC (this was discussed with Wound Care and General surgery). Patient was treated with 5 days of antibiotics (skin and soft tissue infection) and stable for discharge. She was instructed to follow up with her primary general surgeon at [facility] and outpatient wound care.    She had borderline systolic blood pressure for which her beta blockers, ace inhibitor, and potassium sparing diuretic were held throughout her admission. This appears to be her baseline blood pressure. She will continue PO lasix upon discharge, but other medications that would induce hypotension at home (coreg and spironolactone) will be held pending re-evaluation in cardiology clinic.         Interval History:  She has fears about "fluid". She is worried about returning home because of this. She is refusing home health services.    Review of Systems   Constitutional:  Negative for chills, fatigue and fever.   HENT:  Negative for congestion and ear pain.    Respiratory:  Negative for shortness of breath.    Cardiovascular:  Negative for chest pain.   Musculoskeletal:  Negative for back pain.   Skin:  Positive for wound.   Objective:     Vital Signs (Most Recent):  Temp: 97.7 °F (36.5 °C) (07/23/22 1106)  Pulse: 68 (07/23/22 1200)  Resp: 18 (07/23/22 1106)  BP: (!) 98/48 (07/23/22 1106)  SpO2: 98 % (07/23/22 1106)   Vital Signs (24h Range):  Temp:  [97.7 °F (36.5 °C)-98.3 °F (36.8 °C)] 97.7 °F (36.5 °C)  Pulse:  [] 68  Resp:  [17-18] 18  SpO2:  [97 %-100 %] 98 %  BP: ()/(47-59) 98/48     Weight: 52.6 kg (115 lb 15.4 oz)  Body mass index is 20.54 kg/m².    Intake/Output Summary (Last 24 hours) at 7/23/2022 1332  Last data filed at 7/23/2022 0800  Gross per 24 hour   Intake 240 ml   Output 2600 ml   Net -2360 ml        Physical Exam  Vitals reviewed.   Constitutional:       General: She is not in acute distress.     Comments: Chronically ill appearing   HENT:      Head: " Normocephalic.   Eyes:      Pupils: Pupils are equal, round, and reactive to light.   Cardiovascular:      Rate and Rhythm: Normal rate and regular rhythm.   Pulmonary:      Breath sounds: No wheezing or rales.   Abdominal:      Comments: Bowel sounds present. Chronic abdominal wound with fibrinous material adjacent to the wound.    Musculoskeletal:         General: No swelling. Normal range of motion.      Cervical back: Neck supple.   Skin:     General: Skin is warm.      Comments: Sacral wound through to fat, noted on admission.    Neurological:      General: No focal deficit present.      Mental Status: She is alert and oriented to person, place, and time.       Significant Labs: All pertinent labs within the past 24 hours have been reviewed.    Significant Imaging: I have reviewed all pertinent imaging results/findings within the past 24 hours.      Assessment/Plan:      * Abdominal wall cellulitis  Abdominal wall fluid collection spontaneously resolved. Symptoms thought to be from abdominal wall infection  Discussed percutaneous drainage with IR; drainage cancelled.    Discontinue IV Abx  discontinue antibiotics in a.m. after 5 days total therapy per discussion with ID          Advance care planning  Patient declining further palliative care visits      Wound infection after surgery        Severe protein-calorie malnutrition  Albumin 1.8. Pt with poor intake. Nutritionist consulted.       COPD (chronic obstructive pulmonary disease)  Not in exacerbation. Unable to locate PFTs. Pt is a former smoker. PRN duo-nebs ordered.      CAD (coronary artery disease)  Continue aspirin  Continue plavix  Continue ezetimibe      Hypotension  Relative hypotension in the setting of malnutrition and abdominal wall infection    Check AM cortisol      Weakness  Continue home health upon discharge      Hyponatremia  Improving. Pt is asymptomatic.      Alteration in skin integrity in adult  Poorly healing sacral and abdominal wound  in setting of chronic illness and malnutrition. Sacral and abdominal wounds present on admission.  Patient is not a candidate for abdominal wound VAC as patient has retained foreign material (sutures) within the abdominal wound.  This was discussed with Wound Care and General surgery.      Mood disorder        Colovesical fistula  General surgery following.  She needs to see her primary surgeon at [facility]      History of diverticulitis             Chronic congestive heart failure  Not in acute exacerbation. Able to lay flat comfortably.   Continue Coreg with holding parameters per Cardiology  Hold aldactone due to blood pressure  Switch back to PO lasix in AM    Borderline hypotension likely due to underlying cardiomyopathy (EF 20%, G3DD)        VTE Risk Mitigation (From admission, onward)         Ordered     IP VTE HIGH RISK PATIENT  Once         07/18/22 1652     Place sequential compression device  Until discontinued         07/18/22 1652                Discharge Planning   SIVAN: 7/23/2022     Code Status: Full Code   Is the patient medically ready for discharge?:     Reason for patient still in hospital (select all that apply): Pending disposition  Discharge Plan A: Home                  Brandon Anderson MD  Department of Hospital Medicine   [facility])

## 2022-07-23 NOTE — ASSESSMENT & PLAN NOTE
Not in acute exacerbation. Able to lay flat comfortably.   Continue Coreg with holding parameters per Cardiology  Hold aldactone due to blood pressure  Switch back to PO lasix in AM    Borderline hypotension likely due to underlying cardiomyopathy (EF 20%, G3DD)

## 2022-07-23 NOTE — PLAN OF CARE
After speaking with MD patient has agreed to discharge - will need transportation home - house supv to set up LYFT once ready for discharge - RN at bedside completing wound care - ambulatory referral placed for outpatient PT/OT - will resume outpatient wound care       07/23/22 7111   Final Note   Assessment Type Final Discharge Note   Anticipated Discharge Disposition Home   What phone number can be called within the next 1-3 days to see how you are doing after discharge? [phone]   Hospital Resources/Appts/Education Provided Provided patient/caregiver with written discharge plan information;Appointments scheduled and added to AVS

## 2022-07-23 NOTE — ASSESSMENT & PLAN NOTE
Abdominal wall fluid collection spontaneously resolved. Symptoms thought to be from abdominal wall infection  Discussed percutaneous drainage with IR; drainage cancelled.    Discontinue IV Abx  discontinue antibiotics in a.m. after 5 days total therapy per discussion with ID

## 2022-07-23 NOTE — PLAN OF CARE
"Met with patient at bedside to discuss discharge dispo - patient declining home health stating "i'd prefer to continue outpatient wound care at [facility]" - questioned regarding PT/OT & she replied "I want to do that there too. My doctor was working to set it up" - patient requesting to see MD stating "I know what the heart doctor said but I don't think I should be discharged today. I want to be monitored a few days to make sure that fluid don't come back & I have to be readmitted for shortness of breath" - MD aware & will round soon   "

## 2022-07-23 NOTE — NURSING
Discharge instructions given to patient. Patient verbalizes understanding of instructions. IV site discontinued. Patient being transported for discharge to home.

## 2022-07-23 NOTE — PROGRESS NOTES
[facility]    Admission date:  7/18/2022     Assessment    Chronic systolic heart failure  Responded well to IV diuresis yesterday..     Coronary atherosclerosis  Stable and free of angina     Open abdominal wound    Plan and Discussion    Will resume oral diuretics tomorrow.  Cardiac status is stable.    Subjective    Reports brisk urine output after Lasix yesterday.  No orthostasis.    Medications  Current Facility-Administered Medications   Medication Dose Route Frequency Provider Last Rate Last Admin    acetaminophen tablet 650 mg  650 mg Oral Q6H PRN Martha Khan DNP        albuterol-ipratropium 2.5 mg-0.5 mg/3 mL nebulizer solution 3 mL  3 mL Nebulization Q6H PRN Martha Khan DNP        aspirin EC tablet 81 mg  81 mg Oral Daily Martha Khan DNP   81 mg at 07/23/22 0849    carvediloL tablet 3.125 mg  3.125 mg Oral BID Vince Lopez MD   3.125 mg at 07/23/22 0849    clopidogreL tablet 75 mg  75 mg Oral Daily Martha Khan DNP   75 mg at 07/23/22 0849    collagenase ointment   Topical (Top) Daily Brandon Anderson MD   Given at 07/23/22 0853    docusate sodium capsule 100 mg  100 mg Oral BID Martha Khan DNP   100 mg at 07/22/22 2008    fluticasone propionate 50 mcg/actuation nasal spray 100 mcg  2 spray Each Nostril Daily Martha Hameed PA-C   100 mcg at 07/23/22 0858    HYDROcodone-acetaminophen 5-325 mg per tablet 1 tablet  1 tablet Oral Q6H PRN Martha Khan DNP   1 tablet at 07/23/22 0251    magnesium sulfate 2g in water 50mL IVPB (premix)  2 g Intravenous Once Brandon Anderson MD 25 mL/hr at 07/23/22 0945 2 g at 07/23/22 0945    melatonin tablet 6 mg  6 mg Oral Nightly PRN Lea Watkins MD        ondansetron injection 4 mg  4 mg Intravenous Q6H PRN Martha Khan DNP   4 mg at 07/22/22 2301    pantoprazole EC tablet 40 mg  40 mg Oral Daily Martha Khan DNP   40 mg at 07/23/22 0850    potassium bicarbonate  disintegrating tablet 25 mEq  25 mEq Oral Daily Brandon Anderson MD   25 mEq at 22 0850    promethazine (PHENERGAN) 12.5 mg in dextrose 5 % 50 mL IVPB  12.5 mg Intravenous Q6H PRN Martha Khan DNP        simethicone chewable tablet 80 mg  80 mg Oral Q8H PRN Martha Khan DNP   80 mg at 22    sodium chloride 0.9% flush 10 mL  10 mL Intravenous PRN Lea Watkins MD        spironolactone tablet 25 mg  25 mg Oral Daily Vince Lopez MD   25 mg at 22 0849        Physical Exam    Temp:  [97.6 °F (36.4 °C)-98.3 °F (36.8 °C)]   Pulse:  []   Resp:  [17-18]   BP: ()/(41-60)   SpO2:  [94 %-100 %]    Wt Readings from Last 3 Encounters:   22 52.6 kg (115 lb 15.4 oz)   22 51.3 kg (113 lb)   22 51.3 kg (113 lb 1.5 oz)     Physical Exam  Constitutional:       General: She is not in acute distress.  Neck:      Vascular: No hepatojugular reflux or JVD.   Cardiovascular:      Rate and Rhythm: Normal rate and regular rhythm.      Heart sounds: S1 normal and S2 normal. No murmur heard.    Gallop present. S4 sounds present. No S3 sounds.   Pulmonary:      Effort: Pulmonary effort is normal.      Breath sounds: Normal breath sounds and air entry.   Abdominal:      General: Bowel sounds are normal.      Palpations: Abdomen is soft. There is no hepatomegaly.   Musculoskeletal:      Right lower le+ Edema present.      Left lower le+ Edema present.   Skin:     Coloration: Skin is not pale.   Neurological:      Mental Status: She is alert.   Psychiatric:         Behavior: Behavior is cooperative.         Telemetry  Sinus rhythm    Labs  Recent Results (from the past 24 hour(s))   Basic Metabolic Panel    Collection Time: 22  3:52 AM   Result Value Ref Range    Sodium 132 (L) 136 - 145 mmol/L    Potassium 3.5 3.5 - 5.1 mmol/L    Chloride 96 95 - 110 mmol/L    CO2 29 23 - 29 mmol/L    Glucose 122 (H) 70 - 110 mg/dL    BUN 17 8 - 23 mg/dL    Creatinine 0.7 0.5  - 1.4 mg/dL    Calcium 7.9 (L) 8.7 - 10.5 mg/dL    Anion Gap 7 (L) 8 - 16 mmol/L    eGFR if African American >60 >60 mL/min/1.73 m^2    eGFR if non African American >60 >60 mL/min/1.73 m^2   Magnesium    Collection Time: 07/23/22  3:52 AM   Result Value Ref Range    Magnesium 1.5 (L) 1.6 - 2.6 mg/dL   CBC auto differential    Collection Time: 07/23/22  3:52 AM   Result Value Ref Range    WBC 2.72 (L) 3.90 - 12.70 K/uL    RBC 3.86 (L) 4.00 - 5.40 M/uL    Hemoglobin 10.3 (L) 12.0 - 16.0 g/dL    Hematocrit 31.9 (L) 37.0 - 48.5 %    MCV 83 82 - 98 fL    MCH 26.7 (L) 27.0 - 31.0 pg    MCHC 32.3 32.0 - 36.0 g/dL    RDW 19.5 (H) 11.5 - 14.5 %    Platelets 313 150 - 450 K/uL    MPV 9.7 9.2 - 12.9 fL    Immature Granulocytes 0.4 0.0 - 0.5 %    Gran # (ANC) 0.5 (L) 1.8 - 7.7 K/uL    Immature Grans (Abs) 0.01 0.00 - 0.04 K/uL    Lymph # 1.6 1.0 - 4.8 K/uL    Mono # 0.6 0.3 - 1.0 K/uL    Eos # 0.0 0.0 - 0.5 K/uL    Baso # 0.03 0.00 - 0.20 K/uL    nRBC 0 0 /100 WBC    Gran % 19.4 (L) 38.0 - 73.0 %    Lymph % 57.4 (H) 18.0 - 48.0 %    Mono % 21.3 (H) 4.0 - 15.0 %    Eosinophil % 0.4 0.0 - 8.0 %    Basophil % 1.1 0.0 - 1.9 %    Platelet Estimate Appears normal     Aniso Slight     Poik Slight     Poly Occasional     Hypo Occasional     Ovalocytes Occasional     Toxic Granulation Present     Smudge Cells Present     Differential Method Automated              Vince Lopez MD

## 2022-07-23 NOTE — DISCHARGE SUMMARY
[facility]  Discharge Summary      Patient Name: Odette Hart  MRN: [id number]  Patient Class: IP- Inpatient  Admission Date: 7/18/2022  Hospital Length of Stay: 5 days  Discharge Date and Time:  07/23/2022 12:56 PM  Attending Physician: Brandon Anderson MD   Discharging Provider: Brandon Anderson MD  Primary Care Provider: Braxton Barragan MD      HPI:   Ms. Pino is a 66-year-old female with a past medical history that includes adiverticulitis s/p cystoscopy with BL ureteral catheters, open sigmoid colon resection, appendectomy, and creation of end colostomy on 5/9/22.  She also has coronary artery disease with stents and cardiomyopathy, heart failure and AICD placement.  She seems wound care outpatient for sacral wound and a right lower abdomen surgical wound.  Patient came to emergency department today due to pain to the area of skin around her colostomy and weakness.  She reports she has not been eating and drinking for the past week.  She has pain to her stomach with intake of solids or liquids.  Patient also reports that she had the appearance of blood clots come out of her rectum and some blood on her underwear indicating some red blood from her vagina.  Patient does ambulate.  She has a sacral wound that she sees wound care Tuesday through Friday outpatient.  She reports she is not taking medications since last week due to her nausea.  She lives at home by herself with help from her grandson.  General surgery has been consulted due to concern for a possible abscess.  Patient admitted to hospital medicine for further management.      Procedure(s) (LRB):  DRAINAGE (N/A)      Hospital Course:   Chronically ill patient with malnutrition and nonhealing, open abdominal wound. She was thought to have abdominal wall soft tissue infection. A midline fluid collection seen on initial CT scan was found to have resolved on repeat CT scan. There was discussion about placing  a wound VAC, but the patient has retained foreign material (sutures) with rounded wound edges and is not a candidate for a wound VAC (this was discussed with Wound Care and General surgery). Patient was treated with 5 days of antibiotics (skin and soft tissue infection) and stable for discharge. She was instructed to follow up with her primary general surgeon at [facility] and outpatient wound care.    She had borderline systolic blood pressure for which her beta blockers, ace inhibitor, and potassium sparing diuretic were held throughout her admission. This appears to be her baseline blood pressure. She will continue PO lasix and coreg upon discharge, but spironolactone will be held due to blood pressure (pending re-evaluation in cardiology clinic).     The patient will be discharged with home health and will follow up in wound care clinic.    Physical Exam:  Constitutional:       General: She is not in acute distress.Laying flat in bed comfortably.      Comments: Chronically ill appearing   HENT:      Head: Normocephalic.   Eyes:      Pupils: Pupils are equal, round, and reactive to light.   Cardiovascular:      Rate and Rhythm: Normal rate and regular rhythm.   Pulmonary:      Respiratory effort normal     Breath sounds: No wheezing or rales.   Abdominal:      Comments: Bowel sounds present. Chronic abdominal wound with fibrinous material adjacent to the wound.    Musculoskeletal:         General: No swelling. Normal range of motion.      Cervical back: Neck supple.   Skin:     General: Skin is warm.      Comments: Sacral wound through to fat, noted on admission.    Neurological:      General: No focal deficit present.      Mental Status: She is alert and oriented to person, place, and time.            Goals of Care Treatment Preferences:  Code Status: Full Code      Consults:   Consults (From admission, onward)        Status Ordering Provider     Inpatient consult to Cardiology  Once        Provider:   Tony Wade MD    Completed ELVI BAY     Inpatient consult to Spiritual Care  Once        Provider:  (Not yet assigned)    Completed JOANIE MASSEY     Inpatient consult to Palliative Care  Once        Provider:  (Not yet assigned)    Completed ELVI BAY     Inpatient consult to Interventional Radiology  Once        Provider:  (Not yet assigned)    Completed ELVI BAY     Inpatient consult to Midline team  Once        Provider:  (Not yet assigned)    Completed ELVI BAY     Inpatient consult to Infectious Diseases  Once        Provider:  Lucio Perez MD    Completed ELVI BAY     Inpatient consult to Registered Dietitian/Nutritionist  Once        Provider:  (Not yet assigned)    Completed ELVI BAY     Inpatient consult to Registered Dietitian/Nutritionist  Once        Provider:  (Not yet assigned)    Completed AYDEN ARAGON     Inpatient consult to General surgery  Once        Provider:  Braxton Krishnamurthy Jr., MD    Acknowledged FAVIAN CAMPOS          No new Assessment & Plan notes have been filed under this hospital service since the last note was generated.  Service: Hospital Medicine    Final Active Diagnoses:    Diagnosis Date Noted POA    PRINCIPAL PROBLEM:  Abdominal wall abscess [L02.211] 07/20/2022 Yes    Advance care planning [Z71.89] 07/21/2022 Not Applicable    Wound infection after surgery [T81.49XA] 07/20/2022 Yes    Severe protein-calorie malnutrition [E43] 07/18/2022 Yes    CAD (coronary artery disease) [I25.10] 05/24/2022 Yes    Hypotension [I95.9] 05/19/2022 Yes    Weakness [R53.1] 05/16/2022 Yes    Hyponatremia [E87.1] 05/15/2022 Yes    Alteration in skin integrity in adult [R23.9] 05/13/2022 Yes    Mood disorder [F39] 05/13/2022 Yes    COPD (chronic obstructive pulmonary disease) [J44.9] 03/23/2022 No    Colovesical fistula [N32.1] 02/26/2022 Yes    Chronic congestive heart failure [I50.9] 03/07/2019 Yes     Automatic implantable cardioverter-defibrillator in situ [Z95.810] 02/04/2019 Yes      Problems Resolved During this Admission:       Discharged Condition: fair    Disposition: Home-Health Care [facility]    Follow Up:   Follow-up Information     Yohannes Cordova MD. Schedule an appointment as soon as possible for a visit in 1 week(s).    Specialties: Cardiology, Interventional Cardiology  Contact information:  [address]  [address]  [facility] [address]  [phone]             Rafa Cardozo MD. Schedule an appointment as soon as possible for a visit in 1 week(s).    Specialty: Colon and Rectal Surgery  Contact information:  [address]  [facility] [address]  [phone]                       Patient Instructions:      Diet Adult Regular     Activity as tolerated       Significant Diagnostic Studies: Labs:   CMP   Recent Labs   Lab 07/22/22  0432 07/23/22  0352   * 132*   K 3.1* 3.5   CL 98 96   CO2 26 29    122*   BUN 8 17   CREATININE 0.7 0.7   CALCIUM 7.7* 7.9*   ANIONGAP 9 7*   ESTGFRAFRICA >60 >60   EGFRNONAA >60 >60    and CBC   Recent Labs   Lab 07/22/22  0432 07/23/22  0352   WBC 2.10* 2.72*   HGB 9.6* 10.3*   HCT 30.4* 31.9*    313       Pending Diagnostic Studies:     None         Medications:  Reconciled Home Medications:      Medication List      START taking these medications    HYDROcodone-acetaminophen 5-325 mg per tablet  Commonly known as: NORCO  Take 1 tablet by mouth every 12 (twelve) hours as needed for Pain.        CHANGE how you take these medications    carvediloL 3.125 MG tablet  Commonly known as: COREG  Take 1 tablet (3.125 mg total) by mouth 2 (two) times daily.  What changed: when to take this        CONTINUE taking these medications    acetaminophen 500 MG tablet  Commonly known as: TYLENOL  Take 2 tablets (1,000 mg total) by mouth every 8 (eight) hours as needed for Pain.     albuterol 90 mcg/actuation inhaler  Commonly known as: PROVENTIL/VENTOLIN  HFA  Inhale 2 puffs into the lungs every 6 (six) hours as needed for Wheezing. Rescue     ALPRAZolam 0.5 MG tablet  Commonly known as: XANAX  Take 1 tablet (0.5 mg total) by mouth nightly as needed for Anxiety.     aspirin 81 MG EC tablet  Commonly known as: ECOTRIN  Take 1 tablet (81 mg total) by mouth once daily.     clopidogreL 75 mg tablet  Commonly known as: PLAVIX  Take 1 tablet (75 mg total) by mouth once daily.     collagenase ointment  Commonly known as: SANTYL  Apply topically once daily.     docusate sodium 100 MG capsule  Commonly known as: COLACE  Take 1 capsule (100 mg total) by mouth 2 (two) times daily.     esomeprazole 40 MG capsule  Commonly known as: NEXIUM  Take 1 capsule (40 mg total) by mouth before breakfast.     ezetimibe 10 mg tablet  Commonly known as: ZETIA  Take 1 tablet (10 mg total) by mouth every evening.     fluticasone propionate 50 mcg/actuation nasal spray  Commonly known as: FLONASE  1 spray (50 mcg total) by Each Nostril route once daily.     furosemide 20 MG tablet  Commonly known as: LASIX  Take 1 tablet (20 mg total) by mouth once daily.     nitroGLYCERIN 0.4 MG SL tablet  Commonly known as: NITROSTAT  Place 1 tablet (0.4 mg total) under the tongue every 5 (five) minutes as needed for Chest pain.     ondansetron 8 MG Tbdl  Commonly known as: ZOFRAN-ODT  Take 1 tablet (8 mg total) by mouth every 12 (twelve) hours as needed (nausea).     polyethylene glycol 17 gram/dose powder  Commonly known as: GLYCOLAX  Take 17 g by mouth once daily.     simethicone 80 MG chewable tablet  Commonly known as: MYLICON  Take 1 tablet (80 mg total) by mouth every 8 (eight) hours as needed for Flatulence.        STOP taking these medications    metoprolol succinate 25 MG 24 hr tablet  Commonly known as: TOPROL-XL     pantoprazole 40 MG tablet  Commonly known as: PROTONIX     prasugreL 10 mg Tab  Commonly known as: EFFIENT     spironolactone 25 MG tablet  Commonly known as: ALDACTONE     traMADoL 50  mg tablet  Commonly known as: ULTRAM            Indwelling Lines/Drains at time of discharge:   Lines/Drains/Airways     Drain  Duration                Colostomy 05/09/22 1100 Descending/sigmoid LLQ 75 days                Time spent on the discharge of patient: 36 minutes         Brandon Anderson MD  Department of Hospital Medicine  [facility] ([facility]

## 2022-07-23 NOTE — PLAN OF CARE
[facility] S[facility])      HOME HEALTH ORDERS  FACE TO FACE ENCOUNTER    Patient Name: Odette Hart  YOB: 1955    PCP: Braxton Barragan MD   PCP Address: [address] / [place] [address]  PCP Phone Number: [phone]  PCP Fax: [phone]    Encounter Date: 7/18/22    Admit to Home Health    Diagnoses:  Active Hospital Problems    Diagnosis  POA    *Abdominal wall abscess [L02.211]  Yes    Advance care planning [Z71.89]  Not Applicable    Wound infection after surgery [T81.49XA]  Yes    Severe protein-calorie malnutrition [E43]  Yes    CAD (coronary artery disease) [I25.10]  Yes    Hypotension [I95.9]  Yes    Weakness [R53.1]  Yes    Hyponatremia [E87.1]  Yes    Alteration in skin integrity in adult [R23.9]  Yes    Mood disorder [F39]  Yes    COPD (chronic obstructive pulmonary disease) [J44.9]  No    Colovesical fistula [N32.1]  Yes    Chronic congestive heart failure [I50.9]  Yes    Automatic implantable cardioverter-defibrillator in situ [Z95.810]  Yes      Resolved Hospital Problems   No resolved problems to display.       Follow Up Appointments:  Future Appointments   Date Time Provider Department Center   7/26/2022 10:00 AM KALIE ÁLVAREZ DPT [facility]   7/27/2022 10:00 AM KALIE ÁLVAREZ DPT [facility]   7/28/2022 10:00 AM KALIE ÁLVAREZ DPT [facility]   7/29/2022 10:00 AM KALIE ÁLVAREZ DPT [facility]   8/3/2022 10:30 AM KALIE ÁLVAREZ DPT [facility]   8/3/2022 11:00 AM DEBBIE BAZAN DPT [facility]   8/10/2022 12:30 PM LAB, [facility] LAB [facility]   8/10/2022  1:30 PM Freddy Arenas MD [facility]   8/12/2022 10:20 AM Rafa Cardozo MD [facility]ON [address]   9/1/2022 11:00 AM Braxton Barragan MD [facility]       Allergies:  Review of patient's allergies indicates:   Allergen Reactions    Augmentin [amoxicillin-pot clavulanate]  Swelling     Not allergic to amoxicillin just a derivative of augmentin    Lisinopril Other (See Comments)     Fluid around heart    Losartan Other (See Comments)     High potassium    Shellfish containing products Anaphylaxis     Pt.states she is allergic to SEAFOOD since the age of 13    Levaquin [levofloxacin] Other (See Comments)     Very bad joint pain    Clindamycin Palpitations     Chest pain       Medications: Review discharge medications with patient and family and provide education.    Current Facility-Administered Medications   Medication Dose Route Frequency Provider Last Rate Last Admin    acetaminophen tablet 650 mg  650 mg Oral Q6H PRN Martha Khan DNP        albuterol-ipratropium 2.5 mg-0.5 mg/3 mL nebulizer solution 3 mL  3 mL Nebulization Q6H PRN Martha Khan DNP        aspirin EC tablet 81 mg  81 mg Oral Daily Martha Khan DNP   81 mg at 07/23/22 0849    carvediloL tablet 3.125 mg  3.125 mg Oral BID Vince Lopez MD   3.125 mg at 07/23/22 0849    clopidogreL tablet 75 mg  75 mg Oral Daily Martha Khan DNP   75 mg at 07/23/22 0849    collagenase ointment   Topical (Top) Daily Brandon Anderson MD   Given at 07/23/22 0853    docusate sodium capsule 100 mg  100 mg Oral BID Martha Khan DNP   100 mg at 07/22/22 2008    fluticasone propionate 50 mcg/actuation nasal spray 100 mcg  2 spray Each Nostril Daily Martha Hameed PA-C   100 mcg at 07/23/22 0858    [START ON 7/24/2022] furosemide tablet 20 mg  20 mg Oral Daily Vince Lopez MD        HYDROcodone-acetaminophen 5-325 mg per tablet 1 tablet  1 tablet Oral Q6H PRN Martha Khan DNP   1 tablet at 07/23/22 0251    melatonin tablet 6 mg  6 mg Oral Nightly PRN Lea Watkins MD        ondansetron injection 4 mg  4 mg Intravenous Q6H PRN Martha Khan DNP   4 mg at 07/22/22 2301    pantoprazole EC tablet 40 mg  40 mg Oral Daily Martha Khan DNP   40 mg at 07/23/22 0850     potassium bicarbonate disintegrating tablet 25 mEq  25 mEq Oral Daily Brandon Anderson MD   25 mEq at 07/23/22 0850    promethazine (PHENERGAN) 12.5 mg in dextrose 5 % 50 mL IVPB  12.5 mg Intravenous Q6H PRN Martha Khan DNP        simethicone chewable tablet 80 mg  80 mg Oral Q8H PRN Martha Khan DNP   80 mg at 07/22/22 2008    sodium chloride 0.9% flush 10 mL  10 mL Intravenous PRN Lea Watkins MD        spironolactone tablet 25 mg  25 mg Oral Daily Vince Lopez MD   25 mg at 07/23/22 0849     Current Discharge Medication List      CONTINUE these medications which have NOT CHANGED    Details   acetaminophen (TYLENOL) 500 MG tablet Take 2 tablets (1,000 mg total) by mouth every 8 (eight) hours as needed for Pain.  Refills: 0      albuterol (PROVENTIL/VENTOLIN HFA) 90 mcg/actuation inhaler Inhale 2 puffs into the lungs every 6 (six) hours as needed for Wheezing. Rescue  Qty: 18 g, Refills: 1    Associated Diagnoses: Bronchitis      ALPRAZolam (XANAX) 0.5 MG tablet Take 1 tablet (0.5 mg total) by mouth nightly as needed for Anxiety.  Qty: 30 tablet, Refills: 1    Associated Diagnoses: Anxiety      aspirin (ECOTRIN) 81 MG EC tablet Take 1 tablet (81 mg total) by mouth once daily.  Qty: 90 tablet, Refills: 0      clopidogreL (PLAVIX) 75 mg tablet Take 1 tablet (75 mg total) by mouth once daily.  Qty: 30 tablet, Refills: 1      collagenase (SANTYL) ointment Apply topically once daily.  Qty: 90 g, Refills: 1      docusate sodium (COLACE) 100 MG capsule Take 1 capsule (100 mg total) by mouth 2 (two) times daily.  Qty: 60 capsule, Refills: 1      esomeprazole (NEXIUM) 40 MG capsule Take 1 capsule (40 mg total) by mouth before breakfast.  Qty: 30 capsule, Refills: 11    Associated Diagnoses: Epigastric pain      ezetimibe (ZETIA) 10 mg tablet Take 1 tablet (10 mg total) by mouth every evening.  Qty: 90 tablet, Refills: 3      fluticasone propionate (FLONASE) 50 mcg/actuation nasal spray 1 spray (50  mcg total) by Each Nostril route once daily.  Qty: 9.9 mL, Refills: 1    Associated Diagnoses: Sinusitis, unspecified chronicity, unspecified location      furosemide (LASIX) 20 MG tablet Take 1 tablet (20 mg total) by mouth once daily.  Qty: 30 tablet, Refills: 1      ondansetron (ZOFRAN-ODT) 8 MG TbDL Take 1 tablet (8 mg total) by mouth every 12 (twelve) hours as needed (nausea).  Qty: 20 tablet, Refills: 2    Associated Diagnoses: Nausea      traMADoL (ULTRAM) 50 mg tablet Take 1 tablet (50 mg total) by mouth every 8 (eight) hours as needed for Pain.  Qty: 20 tablet, Refills: 0    Comments: Quantity prescribed more than 7 day supply? No  Associated Diagnoses: Epigastric pain      nitroGLYCERIN (NITROSTAT) 0.4 MG SL tablet Place 1 tablet (0.4 mg total) under the tongue every 5 (five) minutes as needed for Chest pain.  Qty: 25 tablet, Refills: 0      polyethylene glycol (GLYCOLAX) 17 gram/dose powder Take 17 g by mouth once daily.  Qty: 510 g, Refills: 1      simethicone (MYLICON) 80 MG chewable tablet Take 1 tablet (80 mg total) by mouth every 8 (eight) hours as needed for Flatulence.  Qty: 120 tablet, Refills: 5    Associated Diagnoses: S/P colostomy; Abdominal gas pain         STOP taking these medications       carvediloL (COREG) 3.125 MG tablet Comments:   Reason for Stopping:         spironolactone (ALDACTONE) 25 MG tablet Comments:   Reason for Stopping:         metoprolol succinate (TOPROL-XL) 25 MG 24 hr tablet Comments:   Reason for Stopping:         pantoprazole (PROTONIX) 40 MG tablet Comments:   Reason for Stopping:         prasugreL (EFFIENT) 10 mg Tab Comments:   Reason for Stopping:                 I have seen and examined this patient within the last 30 days. My clinical findings that support the need for the home health skilled services and home bound status are the following:no   Weakness/numbness causing balance and gait disturbance due to Heart Failure and Infection making it taxing to leave  home.     Diet:   regular diet    Referrals/ Consults  Physical Therapy to evaluate and treat. Evaluate for home safety and equipment needs; Establish/upgrade home exercise program. Perform / instruct on therapeutic exercises, gait training, transfer training, and Range of Motion.  Occupational Therapy to evaluate and treat. Evaluate home environment for safety and equipment needs. Perform/Instruct on transfers, ADL training, ROM, and therapeutic exercises.   to evaluate for community resources/long-range planning.  Aide to provide assistance with personal care, ADLs, and vital signs.    Activities:   activity as tolerated    Nursing:   Agency to admit patient within 24 hours of hospital discharge unless specified on physician order or at patient request    SN to complete comprehensive assessment including routine vital signs. Instruct on disease process and s/s of complications to report to MD. Review/verify medication list sent home with the patient at time of discharge  and instruct patient/caregiver as needed. Frequency may be adjusted depending on start of care date.     Skilled nurse to perform up to 3 visits PRN for symptoms related to diagnosis    Notify MD if SBP > 160 or < 90; DBP > 90 or < 50; HR > 120 or < 50; Temp > 101; O2 < 88%; Other:      Ok to schedule additional visits based on staff availability and patient request on consecutive days within the home health episode.    When multiple disciplines ordered:    Start of Care occurs on Sunday - Wednesday schedule remaining discipline evaluations as ordered on separate consecutive days following the start of care.    Thursday SOC -schedule subsequent evaluations Friday and Monday the following week.     Friday - Saturday SOC - schedule subsequent discipline evaluations on consecutive days starting Monday of the following week.    For all post-discharge communication and subsequent orders please contact patient's primary care physician. If  unable to reach primary care physician or do not receive response within 30 minutes, please contact  for clinical staff order clarification    Miscellaneous   Colostomy Care:  Instruct patient/caregiver to empty bag when full and PRN., Change and clean site every 48 hours and Monitor skin integrity.  Routine Skin for Bedridden Patients: Instruct patient/caregiver to apply moisture barrier cream to all skin folds and wet areas in perineal area daily and after baths and all bowel movements.  Heart Failure:      SN to instruct on the following:    Instruct on the definition of CHF.   Instruct on the signs/sympoms of CHF to be reported.   Instruct on and monitor daily weights.   Instruct on factors that cause exacerbation.   Instruct on action, dose, schedule, and side effects of medications.   Instruct on diet as prescribed.   Instruct on activity allowed.   Instruct on life-style modifications for life long management of CHF   SN to assess compliance with daily weights, diet, medications, fluid retention,    safety precautions, activities permitted and life-style modifications.   Additional 1-2 SN visits per week as needed for signs and symptoms of CHF exacerbation.    For Weight Gain > 2-3 lbs in 1 day or 4-6 lbs over 1 week notify PCP:     Skilled nurse visits daily to instruct and monitor medication adherence until target weight. Then resume prior order frequency.     If weight gain exceeds 5 lbs over target weight, call MD       Potassium supplementation   Scr > 1.5 mg/dl  Scr  1.5 mg/dl    K < 3.0 - NOTIFY MD and 40 mEq bid  40 mEq tid   K- 3.1-3.3  20 mEq bid  20 mEq tid    K 3.4-3.7  10 mEq bid  10 mEq tid      If target weight not reached after 5 days of increased oral diuretic, proceed to IV diuretic with daily patient contact to include face-to-face visit and telephone encounters.        Home Health Aide:  Nursing Weekly, Physical Therapy Three times weekly, Occupational Therapy Three times weekly, Medical  Social Work Weekly and Home Health Aide Three times weekly    Wound Care Orders  yes, patient follows with [facility] wound clinic.Please see clinic MD instructions.    I certify that this patient is confined to her home and needs intermittent skilled nursing care, physical therapy and occupational therapy.

## 2022-07-23 NOTE — SUBJECTIVE & OBJECTIVE
"Interval History:  She has fears about "fluid". She is worried about returning home because of this. She is refusing home health services.    Review of Systems   Constitutional:  Negative for chills, fatigue and fever.   HENT:  Negative for congestion and ear pain.    Respiratory:  Negative for shortness of breath.    Cardiovascular:  Negative for chest pain.   Musculoskeletal:  Negative for back pain.   Skin:  Positive for wound.   Objective:     Vital Signs (Most Recent):  Temp: 97.7 °F (36.5 °C) (07/23/22 1106)  Pulse: 68 (07/23/22 1200)  Resp: 18 (07/23/22 1106)  BP: (!) 98/48 (07/23/22 1106)  SpO2: 98 % (07/23/22 1106)   Vital Signs (24h Range):  Temp:  [97.7 °F (36.5 °C)-98.3 °F (36.8 °C)] 97.7 °F (36.5 °C)  Pulse:  [] 68  Resp:  [17-18] 18  SpO2:  [97 %-100 %] 98 %  BP: ()/(47-59) 98/48     Weight: 52.6 kg (115 lb 15.4 oz)  Body mass index is 20.54 kg/m².    Intake/Output Summary (Last 24 hours) at 7/23/2022 1332  Last data filed at 7/23/2022 0800  Gross per 24 hour   Intake 240 ml   Output 2600 ml   Net -2360 ml        Physical Exam  Vitals reviewed.   Constitutional:       General: She is not in acute distress.     Comments: Chronically ill appearing   HENT:      Head: Normocephalic.   Eyes:      Pupils: Pupils are equal, round, and reactive to light.   Cardiovascular:      Rate and Rhythm: Normal rate and regular rhythm.   Pulmonary:      Breath sounds: No wheezing or rales.   Abdominal:      Comments: Bowel sounds present. Chronic abdominal wound with fibrinous material adjacent to the wound.    Musculoskeletal:         General: No swelling. Normal range of motion.      Cervical back: Neck supple.   Skin:     General: Skin is warm.      Comments: Sacral wound through to fat, noted on admission.    Neurological:      General: No focal deficit present.      Mental Status: She is alert and oriented to person, place, and time.       Significant Labs: All pertinent labs within the past 24 hours " have been reviewed.    Significant Imaging: I have reviewed all pertinent imaging results/findings within the past 24 hours.

## 2022-07-23 NOTE — ASSESSMENT & PLAN NOTE
General surgery following.  She needs to see her primary surgeon at [facility]

## 2022-07-25 NOTE — PROGRESS NOTES
IP Liaison - Final Visit Note    Patient: Odette Hart  MRN:  [id number]  Date of Service:  7/25/2022  Completed by:  RADHA Madrigal    Reason for Visit   Patient presents with    IP Liaison Chart Review            Patient Summary     Discharge Date: 7/23/2022  Discharge telephone number/address: [phone] [place] [address]  Follow up provider: Scout Smith MD  Follow up appointments: 7/28/2022, 2:40pm  Home Health agency & telephone number: n/a  DME ordered &  name: n/a  Assigned OPCM RN/SW: n/a  Report sent to follow up team (PCP/OPCM) via in basket message: n/a  Community Resources arranged including agency name & contact info: No support & services have been documented.    RADHA Madrigal

## 2022-07-26 NOTE — TELEPHONE ENCOUNTER
From: Daphnie Kramer RN   Sent: 7/25/2022   9:43 AM CDT   To: Brooklyn Dos Santos MA   Subject: RE: Wound Care Appointment                       Good morning, I spoke to the patient and she is already an established wound care patient at [facility] wound clinic   ----- Message -----

## 2022-07-26 NOTE — PROGRESS NOTES
C3 nurse attempted to contact Odette Hart for a TCC post hospital discharge follow up call. The patient is unable to conduct the call @ this time. The patient requested a callback.    The patient has a scheduled [facility] appointment with Scout Smith MD on 07/28/22 @ 1695. Message sent to Physician staff.

## 2022-07-27 NOTE — PROGRESS NOTES
C3 nurse attempted to contact Odette Hart for a TCC post hospital discharge follow up call. No answer. Left voicemail with callback information. The patient does not have a scheduled HOSFU appointment. Message sent to PCP staff for assistance with scheduling visit with patient.

## 2022-07-28 PROBLEM — R60.0 PERIPHERAL EDEMA: Status: ACTIVE | Noted: 2022-01-01

## 2022-07-28 PROBLEM — Z93.3 COLOSTOMY IN PLACE: Status: ACTIVE | Noted: 2022-01-01

## 2022-07-28 PROBLEM — Z90.49 HISTORY OF PARTIAL COLECTOMY: Status: ACTIVE | Noted: 2022-01-01

## 2022-07-28 NOTE — PROGRESS NOTES
Subjective:       Patient ID: Odette Hart is a 66 y.o. female.    Chief Complaint: Hospital Follow Up    HPI:  66 year-old white female--in for hospital follow-up  Patient was admitted 07/18/2022 for cellulitis of the appt--patient had diverticulitis with abscess formation and fistula draining into pt's bladder --had to have a partial colectomy with colostomy--patient was on vancomycin.  Patient not on antibiotics told had no infection.  Patient was in the hospital--had congestive heart failure given a shot IV Lasix and urinated really well--patient now on Lasix but has some peripheral edema--feet hurt  Lab was reviewed showed white count 2.72--Hct 31.9 b--magnesium 1.5--glucose 122--sodium 132  Main problem patient having difficulty getting with the fluid and getting occasionally short of breath  Goes doubt patient wound care 4 days a week      ROS:  Skin: no psoriasis, eczema, skin cancer  HEENT: No headache, ocular pain, blurred vision, diplopia, epistaxis, hoarseness change in voice, thyroid trouble  Lung: No pneumonia, asthma, + hx Tb child txed ,no wheezing  has inhalers use occasionally,no SOB, used to smoke quit 3 years ago half pack per day for 30 years  Heart: No chest pain, ankle edema, palpitations, MI, herb murmur, hypertension, hyperlipidemia + CAD with stent LAD/old mi/defibrillator pacemaker/ischemic cardiomyopathy/hypertension right bundle branch block with left anterior fascicular block/hyperlipidemia  Abdomen: + nausea, no  vomiting, diarrhea, constipation, ulcers, hepatitis, gallbladder disease, melena, hematochezia, hematemesis  : no UTI, renal disease, stones  GYN needs hysterectomy --uterine prolapse--does not want to get mammogram due to defibrillator  MS: no fractures, O/A, lupus, rheumatoid, gout--history elevated rheumatoid factor--came out 500 did not go to rheumatologist is Dr. Yung mao  Neuro: No dizziness, LOC, seizures   No diabetes, no anemia, +anxiety, + depression     2 children lives alone Retired     Objective:   Physical Exam:  General: Well nourished, well developed, no acute distress  Skin:  Wound dehiscence mid incision line patient going to palpation wound care   HEENT: Eyes PERRLA, EOM intact, nose patent, throat non  erythematous ears TMs clear  NECK: Supple, no bruits, No JVD, no nodes  Lungs: Clear, no rales, rhonchi, wheezing   Heart: Regular rate and rhythm, no murmurs, gallops, or rubs  Abdomen: flat, bowel sounds positive, no tenderness, or organomegaly colostomy left lower quadrant  MS: Range of motion and muscle strength intact  Neuro: Alert, CN intact, oriented X 3  Extremities: No cyanosis, clubbing, + edema feet bilat         Assessment:       1. Chronic hypoxemic respiratory failure    2. Peripheral edema    3. History of partial colectomy    4. Colostomy in place    5. Mild episode of recurrent major depressive disorder    6. Ischemic cardiomyopathy    7. History of coronary artery stent placement    8. RBBB with left anterior fascicular block    9. History of myocardial infarction    10. Primary hypertension    11. Acute on chronic systolic heart failure    12. VRE (vancomycin-resistant Enterococci) infection    13. Leukopenia, unspecified type    14. Normocytic anemia        Plan:       Chronic hypoxemic respiratory failure    Peripheral edema  -     CBC Auto Differential; Future; Expected date: 07/28/2022  -     Comprehensive Metabolic Panel; Future; Expected date: 07/28/2022  -     BNP; Future; Expected date: 07/28/2022    History of partial colectomy    Colostomy in place    Mild episode of recurrent major depressive disorder    Ischemic cardiomyopathy    History of coronary artery stent placement    RBBB with left anterior fascicular block    History of myocardial infarction    Primary hypertension    Acute on chronic systolic heart failure    VRE (vancomycin-resistant Enterococci) infection    Leukopenia, unspecified type    Normocytic  anemia    Other orders  -     HYDROcodone-acetaminophen (NORCO) 5-325 mg per tablet; Take 1 tablet by mouth every 12 (twelve) hours as needed for Pain.  Dispense: 10 tablet; Refill: 0        Main Reason for Visit  Hospital follow-up--patient with diverticulitis with abscess formation with fistula into the bladder--subsequent partial colectomy with colostomy--wound dehiscence--patient presently getting wound care 4 days per week  Peripheral edema--on Lasix 40 mg per day told to increase to 40 morning 20 afternoon--limit fluids to a 1000 cc per day or 33 oz--elevate the feet 30 minutes in the morning and 30 knee afternoon above her heart  Lab--Fridays CBCs CMP BMP  Other medical issues  History TB skin test positive treated as a child  History tobacco abuse smokes half pack per day times 30 years quit 3 years ago  Multiple cardiac issues hyperlipidemia old MI CAD with stent in the LAD defibrillator pacemaker ischemic cardiomyopathy CHF--Coreg 12.5, zetia   History chronic bronchitis with bronchospasm has inhaler uses intermittently  History uterine prolapse discussed hysterectomy not sure of wants a due to age  History of elevated rheumatoid factor does not want to take any DMARDs is no on her see Rheumatology  Anxiety depression on Xanax and Celexa  History of GERD on omeprazole occasional Carafate  Refuses mammogram refuses to see rheumatologist despite history rheumatoid factor positive 500  Appointment see Dr. Barragan 2 weeks   OK refill [place]--told should not drive on this medication

## 2022-07-29 NOTE — TELEPHONE ENCOUNTER
----- Message from Pamela Kaiser sent at 7/29/2022  8:20 AM CDT -----  Contact: Patient, [phone]  Calling to speak with Emani. Please call her back. Thanks.

## 2022-07-29 NOTE — ASSESSMENT & PLAN NOTE
"- Consult for advance care planning and support in patient with complex surgical history resulting in chronic wounds, currently in hospital for nausea, vomiting, and increased drainage from wounds. Chart reviewed in depth; discussed pt in person with primary team and during MDT rounds.   - Met with pt at bedside; awake, sitting up in chair, agreeable to conversation. Introduced role of palliative medicine, and learned more about pt outside of hospital. She has been  since 2011. Her 's family is from the [place] initially, so she told me about the [employer] in [facility], which celebrates this heritage. She has two sons (Telly lives locally, whereas Abelardo lives in [place]). She was tearful while we discussed her strained relationship with her son Telly. For this reason, her preferred MPOA is her other son Abelardo Ortiz ([phone]); suggested completing MPOA paperwork prior to discharge to clarify this. She does get along well with her grandson, who often will help with appointments. She resides alone, and does majority of her ADLs independently.   - She has always been active and outgoing (loves dancing, for example), and says it's been tough sitting around the house "like an old lady." Validated that it seems like she's been through a lot the last few months to years.   - She does have insight into the possibility of her wounds never healing, though for now, remains hopeful. Her specific goals are to improve her nutritional and functional status as much as possible. While she prefers to avoid being in the hospital, she stated, "if I didn't go, that means I would die." We did discuss an alternative path (hospice care), and she was clear that her current goals are not in line with this philosophy of care, and that she would like to continue maximal medical therapy. Did encourage her to consider hospice if she has a worsening functional status, is more frequently in the hospital, or " is more symptomatic.   - Following discharge, she is open to having more support at home (ie, home PT/OT or HH), and does not want to go to a SNF. Told her we will respect this preference.   - Spiritual care consult for additional support; patient enjoys having visitors   - Did not discuss code status, as it was initial time meeting patient   - Our team will follow up with pt while admitted and during future hospital stays    There are 2 Wet Read(s) to document.

## 2022-08-01 PROBLEM — R82.71 ASYMPTOMATIC BACTERIURIA: Status: ACTIVE | Noted: 2022-01-01

## 2022-08-01 NOTE — ED TRIAGE NOTES
Pt c/o nausea and vomiting x 2 days after eating hot sausage, has been sick ever since. Has colostomy, had formed stool this am, with bright red stool. Tolerating fluids this am.

## 2022-08-01 NOTE — ED PROVIDER NOTES
Encounter Date: 8/1/2022       History     Chief Complaint   Patient presents with    Abdominal Pain     Abdominal pain X1 day after eating hot sausage. Pt. Also reporting reporting vaginal and rectal bleeding.      Ms. Pino is a 66-year-old female with a past medical history that includes adiverticulitis s/p cystoscopy with BL ureteral catheters, open sigmoid colon resection, appendectomy, creation of end colostomy on 5/9/22, heart failure and AICD placement.  Patient presents to the emergency department due to abdominal pain which she states has been going on for the past 2 days after she had some hot sausage. She describes the abdominal pain as aching all over her abdomen she also states that she has been having increased output into her colostomy bag. Additionally patient states that the wound next to her colostomy bag has been draining foul-smelling fluid.  Patient also states that a week ago she had noticed some rectal bleeding but it has since resolved.  She does states that today she had a bowel movement from her rectum which is not something that usually happens.         Review of patient's allergies indicates:   Allergen Reactions    Augmentin [amoxicillin-pot clavulanate] Swelling     Not allergic to amoxicillin just a derivative of augmentin    Lisinopril Other (See Comments)     Fluid around heart    Losartan Other (See Comments)     High potassium    Shellfish containing products Anaphylaxis     Pt.states she is allergic to SEAFOOD since the age of 13    Levaquin [levofloxacin] Other (See Comments)     Very bad joint pain    Clindamycin Palpitations     Chest pain     Past Medical History:   Diagnosis Date    Acute coronary syndrome     Acute exacerbation of chronic obstructive pulmonary disease (COPD) 3/23/2022    Allergy     Arthritis     Cardiomyopathy     CHF (congestive heart failure)     Coronary artery disease     Coronary artery disease of native artery of native heart with  stable angina pectoris 2021: [facility]: Cath: LAD: Proximal stent patent. LCX: Proximal 80%. LCX: Dominant. Moderate disease. LCX: HEVER 2.75 x 18 mm. Distal dissection. HEVER 2.75 x 22 mm.     Diverticulitis     Diverticulosis     Familial hypercholesterolemia 2022    Former smoker 2022    Heart attack     Heart disease     History of myocardial infarction 2022    Hyperlipidemia     Hypertension     Hypertension 2022    ICD (implantable cardioverter-defibrillator) in place     Non-ST elevation myocardial infarction (NSTEMI) 2019     Past Surgical History:   Procedure Laterality Date    APPENDECTOMY N/A 2022    Procedure: APPENDECTOMY;  Surgeon: Rafa Cardozo MD;  Location: [facility] OR 2ND FLR;  Service: Colon and Rectal;  Laterality: N/A;    ATRIAL CARDIAC PACEMAKER INSERTION      CECECTOMY N/A 2022    Procedure: EXCISION, CECUM;  Surgeon: Rafa Cardozo MD;  Location: [facility] OR 2ND FLR;  Service: Colon and Rectal;  Laterality: N/A;     SECTION      CHOLECYSTECTOMY N/A 2022    Procedure: CHOLECYSTECTOMY;  Surgeon: Doug Epstein MD;  Location: [facility] OR 2ND FLR;  Service: General;  Laterality: N/A;    COLONOSCOPY N/A 2022    Procedure: COLONOSCOPY;  Surgeon: Rafa Cardozo MD;  Location: [facility] OR 2ND FLR;  Service: Colon and Rectal;  Laterality: N/A;    COLOSTOMY  2022    Procedure: CREATION, COLOSTOMY;  Surgeon: Rafa Cardozo MD;  Location: [facility] OR 2ND FLR;  Service: Colon and Rectal;;    CORONARY STENT PLACEMENT      LEFT HEART CATHETERIZATION Left 2022    Procedure: CATHETERIZATION, HEART, LEFT;  Surgeon: Yohannes Cordova MD;  Location: [facility] CATH LAB;  Service: Cardiology;  Laterality: Left;     Family History   Problem Relation Age of Onset    Heart disease Mother     Emphysema Father     Heart attack Brother      Social History     Tobacco Use    Smoking status: Former Smoker     Packs/day: 0.50     Start  date: 1976     Quit date: 2012     Years since quittin.6    Smokeless tobacco: Never Used   Substance Use Topics    Alcohol use: No    Drug use: No     Review of Systems   Constitutional: Positive for fatigue. Negative for activity change, appetite change, chills, fever and unexpected weight change.   HENT: Negative for congestion, ear discharge, facial swelling, postnasal drip, rhinorrhea, sinus pressure, sinus pain, sore throat and trouble swallowing.    Eyes: Negative for photophobia, pain and visual disturbance.   Respiratory: Negative for cough, chest tightness, shortness of breath and wheezing.    Cardiovascular: Negative for chest pain, palpitations and leg swelling.   Gastrointestinal: Positive for abdominal pain, nausea and vomiting. Negative for constipation and diarrhea.   Genitourinary: Negative for decreased urine volume, difficulty urinating, dysuria, flank pain, menstrual problem and pelvic pain.   Musculoskeletal: Negative for arthralgias, back pain, gait problem and myalgias.   Skin: Positive for wound. Negative for color change.   Neurological: Negative for dizziness, seizures, speech difficulty, weakness, light-headedness, numbness and headaches.   Psychiatric/Behavioral: Negative for agitation and confusion.       Physical Exam     Initial Vitals [22 0717]   BP Pulse Resp Temp SpO2   128/82 (!) 116 18 98.1 °F (36.7 °C) 99 %      MAP       --         Physical Exam    Nursing note and vitals reviewed.  Constitutional: She appears well-developed and well-nourished. She is not diaphoretic. She appears distressed.   HENT:   Head: Normocephalic and atraumatic.   Mouth/Throat: Oropharynx is clear and moist. No oropharyngeal exudate.   Eyes: Conjunctivae and EOM are normal. Pupils are equal, round, and reactive to light. Right eye exhibits no discharge. Left eye exhibits no discharge.   Neck: No tracheal deviation present. No JVD present.   Normal range of motion.  Cardiovascular:  Normal rate, normal heart sounds and intact distal pulses. Exam reveals no gallop.    No murmur heard.  Pulmonary/Chest: Breath sounds normal. No respiratory distress. She has no wheezes. She has no rales. She exhibits no tenderness.   Abdominal: Abdomen is soft. Bowel sounds are normal. She exhibits no distension and no mass. There is abdominal tenderness.   Generalized abdominal tenderness  Presence of 3 cm circumferential deep wound adjacent to colostomy bag, presence of sutures poking out of wound.  There is no rebound and no guarding.   Musculoskeletal:         General: No tenderness or edema. Normal range of motion.      Cervical back: Normal range of motion.     Neurological: She is alert and oriented to person, place, and time. She has normal strength.   Skin: Skin is warm. Capillary refill takes less than 2 seconds.   Psychiatric: She has a normal mood and affect.         ED Course   Procedures  Labs Reviewed   CBC W/ AUTO DIFFERENTIAL - Abnormal; Notable for the following components:       Result Value    WBC 2.25 (*)     RBC 3.85 (*)     Hemoglobin 10.4 (*)     Hematocrit 33.3 (*)     MCHC 31.2 (*)     RDW 22.6 (*)     Gran # (ANC) 0.8 (*)     Gran % 35.6 (*)     Lymph % 49.3 (*)     All other components within normal limits   COMPREHENSIVE METABOLIC PANEL - Abnormal; Notable for the following components:    Potassium 5.3 (*)     Albumin 2.1 (*)     AST 97 (*)     All other components within normal limits   LACTIC ACID, PLASMA - Abnormal; Notable for the following components:    Lactate (Lactic Acid) 3.1 (*)     All other components within normal limits   PHOSPHORUS - Abnormal; Notable for the following components:    Phosphorus 4.6 (*)     All other components within normal limits   URINALYSIS, REFLEX TO URINE CULTURE - Abnormal; Notable for the following components:    Specific Gravity, UA >=1.030 (*)     Protein, UA 2+ (*)     Ketones, UA Trace (*)     Occult Blood UA 1+ (*)     All other components  within normal limits    Narrative:     Specimen Source->Urine   URINALYSIS MICROSCOPIC - Abnormal; Notable for the following components:    RBC, UA 38 (*)     WBC, UA 18 (*)     Bacteria Many (*)     Non-Squam Epith 3 (*)     All other components within normal limits    Narrative:     Specimen Source->Urine   CULTURE, URINE   MAGNESIUM   B-TYPE NATRIURETIC PEPTIDE   SARS-COV-2 RNA AMPLIFICATION, QUAL   TYPE & SCREEN   TYPE & SCREEN        ECG Results          EKG 12-lead (Final result)  Result time 08/01/22 10:40:05    Final result by Interface, Lab In [facility] (08/01/22 10:40:05)                 Narrative:    Test Reason : R89.9,    Vent. Rate : 108 BPM     Atrial Rate : 108 BPM     P-R Int : 122 ms          QRS Dur : 120 ms      QT Int : 366 ms       P-R-T Axes : 081 -33 078 degrees     QTc Int : 490 ms    Sinus tachycardia  Left axis deviation  Cannot exclude  Anterolateral infarct (cited on or before 16-JAN-2019)  Abnormal ECG  When compared with ECG of 18-JUL-2022 13:25,  QRS duration has decreased  Questionable change in initial forces of Lateral leads  T wave inversion no longer evident in Anterior leads  QT has shortened  Confirmed by Paulino FLORES MD ([id number]) on 8/1/2022 10:39:49 AM    Referred By: [email or web address]   SELF           Confirmed By:Paulino FLORES MD                            Imaging Results          X-Ray Chest AP Portable (Final result)  Result time 08/01/22 10:27:14    Final result by Felice Willingham MD (08/01/22 10:27:14)                 Impression:      No convincing evidence of acute detrimental change relative prior radiograph performed 07/18/2022.      Electronically signed by: Felice Willingham  Date:    08/01/2022  Time:    10:27             Narrative:    EXAMINATION:  XR CHEST AP PORTABLE    CLINICAL HISTORY:  Shortness of breath    TECHNIQUE:  Single frontal view of the chest was performed.    COMPARISON:  Chest radiograph 07/18/2022    FINDINGS:  Monitoring leads overlie the chest.  Left subclavian  approach AICD, as before.  Enlarged cardiac silhouette again noted.  Interstitial prominent opacities are noted, which may be on a chronic basis, with persistent pulmonary vascular congestion/edema not excluded.  Overall relatively similar appearance to 07/18/2022 study.    No definite pneumothorax or large volume pleural effusion.  No acute findings identified in the visualized abdomen.  Osseous and soft tissue structures appear without definite acute abnormality.                               CT Abdomen Pelvis With Contrast (Final result)  Result time 08/01/22 10:51:06    Final result by Kirsten Gunter MD (08/01/22 10:51:06)                 Impression:      Mild stranding near the proximal duodenum and gastric pylorus.  Recommend correlation for gastroduodenitis.    Postoperative changes of partial colectomy with left-sided colostomy.    Soft tissue thickening with minimal air in the soft tissues overlying the sacrococcygeal region, correlating with patient's known wound.    Small volume pleural fluid bilaterally increased compared to prior.    Continued reticular opacification of the visualized lung bases similar to prior which may represent interstitial lung disease, pulmonary edema, or other infectious/noninfectious inflammatory process.    Previously noted midline anterior abdominal fluid collection demonstrates decreased fluid component compared to prior, with minimal air and is similar in size.  No significant sized drainable abscess.    Electronically signed by resident: Braxton Aquino  Date:    08/01/2022  Time:    09:57    Electronically signed by: Kirsten Gunter MD  Date:    08/01/2022  Time:    10:51             Narrative:    EXAMINATION:  CT ABDOMEN PELVIS WITH CONTRAST    CLINICAL HISTORY:  Abdominal pain, acute, nonlocalized;    TECHNIQUE:  Routine axial CT images of the abdomen and pelvis were obtained after administration 75cc of IV Omnipaque 350.    Coronal and Sagittal reformatted images were  also obtained.    COMPARISON:  CT abdomen pelvis 07/18/2022, 06/06/2022    FINDINGS:  Continued reticular opacification of the visualized lung bases similar to prior.  Small volume bilateral pleural fluid increased from prior.  The visualized portions of the heart demonstrate no cardiomegaly or significant volume pericardial fluid.  Partially imaged right atrial and right ventricle leads.  Stable prominent cardiophrenic 0.8 cm lymph node (02:23).    The liver is normal in size and attenuation with no focal hepatic abnormality.  There is periportal edema.  Gallbladder is surgically absent.  There is no intra-or extrahepatic biliary ductal dilatation.    Small hiatal hernia.  Previously noted duodenal diverticulum is not well visualized this exam.  There is mild stranding about the proximal duodenum and gastric pylorus.  The spleen, pancreas, and adrenal glands are unremarkable.    Kidneys are normal in size and location.  There is symmetric enhancement.  No hydronephrosis.  No dilatation of the right ureter.  The visualized left ureter demonstrates no dilatation proximally.  Urinary bladder is partially distended and unremarkable.  The uterus demonstrates no significant abnormality.    The abdominal aorta is normal in course with extensive atherosclerotic disease.  Mild fusiform ectasia of the infrarenal aorta measuring up to 2.5 cm (2:77).  Multifocal mild narrowing of the infrarenal aorta.    Mild stable scattered mesenteric stranding or edema.  No significant dilatation of the small bowel loops.  There are postoperative changes of reported prior partial right hemicolectomy and appendectomy.  There is mild wall thickening near the anastomosis (2:157)..  There are postoperative changes of sigmoid colectomy with left sided colostomy.  Rectal stump or Landry's pouch is unremarkable.  There is mild diffuse stool burden of the residual colon.  No free intraperitoneal air.    Osseous structures are intact without  evidence of acute fracture.  Degenerative changes of spine most significant at L5-S1.    There are postoperative changes of the midline anterior abdominal wall.  At the anterior midline of the previously noted fluid and air collection is less conspicuous with less fluid attenuation however there is continued small foci of gas and soft tissue thickening measuring proximally 3.0 x 0.8 cm (2:101), with previous fluid air collection measuring 2.4 x 1.0  cm.    Generalized subcutaneous edema.  There is increased soft tissue density with minimal air in the soft tissues overlying the sacrococcygeal region, for instance series 2, image 155.  This is compatible with patient's known wound in this region.                                 Medications   piperacillin-tazobactam 4.5 g in sodium chloride 0.9% 100 mL IVPB (ready to mix system) (4.5 g Intravenous New Bag 8/1/22 1401)   vancomycin 1.5 g in dextrose 5 % 250 mL IVPB (ready to mix) (has no administration in time range)   sodium chloride 0.9% bolus 250 mL (0 mLs Intravenous Stopped 8/1/22 0950)   ondansetron injection 4 mg (4 mg Intravenous Given 8/1/22 0905)   iohexoL (OMNIPAQUE 350) injection 75 mL (75 mLs Intravenous Given 8/1/22 0942)   ondansetron injection 4 mg (4 mg Intravenous Given 8/1/22 1208)   morphine injection 4 mg (4 mg Intravenous Given 8/1/22 1208)     Medical Decision Making:   History:   Old Medical Records: I decided to obtain old medical records.  Old Records Summarized: records from previous admission(s).  Initial Assessment:   Ms. Pino is a 66-year-old female with a past medical history that includes adiverticulitis s/p cystoscopy with BL ureteral catheters, open sigmoid colon resection, appendectomy, ,creation of end colostomy on 5/9/22, heart failure and AICD placement.  Patient presents to the emergency department due to abdominal pain  Differential Diagnosis:   Cellulitis  Intra-abdominal abscess  UTI  Sepsis  Clinical Tests:   Lab Tests:  Ordered and Reviewed  Radiological Study: Ordered and Reviewed  ED Management:  Patient was examined,  Lab workup was significant for UTI as well as an elevated lactate.  I was concerned that patient was having an infection and so she was given antibiotics.  Discussion was had with colorectal surgery who evaluated the patient and stated that they will follow her after she is admitted but they would not admit the patient primarily to their service due to her multiple cardiac co-morbidities.  Discussion was had with medicine and patient was admitted             ED Course as of 08/01/22 1616   Mon Aug 01, 2022   1125 Discussion had with General surgery they will come see patient [OO]      ED Course User Index  [OO] Elli Young MD             Clinical Impression:   Final diagnoses:  [R06.02] Shortness of breath  [R89.9] Abnormal laboratory test  [N39.0] Urinary tract infection without hematuria, site unspecified (Primary)  [L03.90] Cellulitis, unspecified cellulitis site          ED Disposition Condition    Observation               Elli Young MD  Resident  08/01/22 1616

## 2022-08-01 NOTE — ED NOTES
Patient eating ice chips.  Brighter affect.  Continues to c/o mild cramping LLQ.  Zosyn 4.5gm IVPB infusing without difficulty.

## 2022-08-01 NOTE — ED NOTES
Pt assisted to bedpan for u/a.  Pt noted to have a full thickness wound to sacrum.  Dressing changed.  Pt reports that she has home health nurse wound care at home.

## 2022-08-01 NOTE — HPI
Ms. Pino is a 66-year-old female with a past medical history that includes adiverticulitis s/p cystoscopy with BL ureteral catheters, open sigmoid colon resection, appendectomy, creation of end colostomy on 5/9/22, heart failure and AICD placement.  Patient presents to the emergency department due to abdominal pain which she states has been going on for the past 2 days after she had some hot sausage. She describes the abdominal pain as aching all over her abdomen she also states that she has been having increased output into her colostomy bag. Additionally patient states that the wound next to her colostomy bag has been draining foul-smelling fluid.  Patient also states that a week ago she had noticed some rectal bleeding but it has since resolved.  She does states that today she had a bowel movement from her rectum which is not something that usually happens.   Patient is also endorsing increasing shortness of breath and lower extremity swelling. She took double her Lasix dose yesterday but her SOB is persistent. She is seen by wound care as an outpatient 4x/week.     Per chart review, she has been readmitted multiple times since her sigmoidectomy, for wound care and SOB/HF exacerbation, most recently to [facility] from 7/18/22 - 7/23/22. There she was treated for a midline wound with wound care and 5 days of abx, as well as for CHF with diuretics.      In the ED, Lab workup was significant for UTI as well as an elevated lactate. She was given antibiotics. She was evaluated by CRS who do not feel surgical intervention is warranted at this time.

## 2022-08-01 NOTE — ED NOTES
Patient resting in stretcher and is in NAD at this time. Pt is awake alert and oriented x4, VSS, respirations even and unlabored. Pt updated on plan of care. Bed low and locked with side rails up x2, call bell in pt reach. Pt voices no needs at this time.  Pillow provided.  Will continue to monitor.

## 2022-08-01 NOTE — CONSULTS
[facility] - Emergency Dept  Colorectal Surgery  Consult Note    Patient Name: Odette Hart  MRN: [id number]  Admission Date: 8/1/2022  Hospital Length of Stay: 0 days  Attending Physician: Trinh Salazar Jr., MD  Primary Care Provider: Braxton Barragan MD    Consults  Subjective:     Chief Complaint/Reason for Admission: Shortness of breath, abdominal pain    History of Present Illness:   66F PMH CAD and PCI (1/2022), CHF (EF 20%), HTN, ICD, Chronic hypoxemic respiratory failure, MDD, cholecystitis and diverticulitis s/p open sigmoidectomy, cholecystectomy, takedown of colovesical and colovaginal fistuli, and partial cecectomy with appendectomy (5/9/22), presenting with SOB and abdominal pain. Since her sigmoidectomy patient has been readmitted multiple times for wound care and SOB/HF exacerbation, most recently to [facility] from 7/18/22 - 7/23/22. There she was treated for a midline wound with wound care and 5 days of abx, as well as for CHF with diuretics.     Patient states that she had a hot sausage 2 days ago after which she began to develop increasing abdominal pain around her ostomy. She continues to have ostomy output. Pt also endorses continued drainage from her midline wound - she is seen by wound care as an outpatient 4x/week.     Patient is also endorsing increasing shortness of breath and lower extremity swelling. She took double her Lasix dose yesterday but her SOB is persistent.      Current Facility-Administered Medications   Medication    piperacillin-tazobactam 4.5 g in sodium chloride 0.9% 100 mL IVPB (ready to mix system)    vancomycin 1.5 g in dextrose 5 % 250 mL IVPB (ready to mix)     Current Outpatient Medications   Medication Sig    acetaminophen (TYLENOL) 500 MG tablet Take 2 tablets (1,000 mg total) by mouth every 8 (eight) hours as needed for Pain.    albuterol (PROVENTIL/VENTOLIN HFA) 90 mcg/actuation inhaler Inhale 2 puffs into the lungs every 6 (six) hours as needed for Wheezing.  Rescue    ALPRAZolam (XANAX) 0.5 MG tablet Take 1 tablet (0.5 mg total) by mouth nightly as needed for Anxiety.    aspirin (ECOTRIN) 81 MG EC tablet Take 1 tablet (81 mg total) by mouth once daily.    carvediloL (COREG) 3.125 MG tablet Take 1 tablet (3.125 mg total) by mouth 2 (two) times daily.    clopidogreL (PLAVIX) 75 mg tablet Take 1 tablet (75 mg total) by mouth once daily.    collagenase (SANTYL) ointment Apply topically once daily.    docusate sodium (COLACE) 100 MG capsule Take 1 capsule (100 mg total) by mouth 2 (two) times daily.    esomeprazole (NEXIUM) 40 MG capsule Take 1 capsule (40 mg total) by mouth before breakfast.    ezetimibe (ZETIA) 10 mg tablet Take 1 tablet (10 mg total) by mouth every evening.    fluticasone propionate (FLONASE) 50 mcg/actuation nasal spray 1 spray (50 mcg total) by Each Nostril route once daily.    furosemide (LASIX) 20 MG tablet Take 1 tablet (20 mg total) by mouth once daily.    HYDROcodone-acetaminophen (NORCO) 5-325 mg per tablet Take 1 tablet by mouth every 12 (twelve) hours as needed for Pain.    nitroGLYCERIN (NITROSTAT) 0.4 MG SL tablet Place 1 tablet (0.4 mg total) under the tongue every 5 (five) minutes as needed for Chest pain.    ondansetron (ZOFRAN-ODT) 8 MG TbDL Take 1 tablet (8 mg total) by mouth every 12 (twelve) hours as needed (nausea).    polyethylene glycol (GLYCOLAX) 17 gram/dose powder Take 17 g by mouth once daily.    simethicone (MYLICON) 80 MG chewable tablet Take 1 tablet (80 mg total) by mouth every 8 (eight) hours as needed for Flatulence.       Review of patient's allergies indicates:   Allergen Reactions    Augmentin [amoxicillin-pot clavulanate] Swelling     Not allergic to amoxicillin just a derivative of augmentin    Lisinopril Other (See Comments)     Fluid around heart    Losartan Other (See Comments)     High potassium    Shellfish containing products Anaphylaxis     Pt.states she is allergic to SEAFOOD since the age of  13    Levaquin [levofloxacin] Other (See Comments)     Very bad joint pain    Clindamycin Palpitations     Chest pain       Past Medical History:   Diagnosis Date    Acute coronary syndrome     Acute exacerbation of chronic obstructive pulmonary disease (COPD) 3/23/2022    Allergy     Arthritis     Cardiomyopathy     CHF (congestive heart failure)     Coronary artery disease     Coronary artery disease of native artery of native heart with stable angina pectoris 2021: [facility]: Cath: LAD: Proximal stent patent. LCX: Proximal 80%. LCX: Dominant. Moderate disease. LCX: HEVER 2.75 x 18 mm. Distal dissection. HEVER 2.75 x 22 mm.     Diverticulitis     Diverticulosis     Familial hypercholesterolemia 2022    Former smoker 2022    Heart attack     Heart disease     History of myocardial infarction 2022    Hyperlipidemia     Hypertension     Hypertension 2022    ICD (implantable cardioverter-defibrillator) in place     Non-ST elevation myocardial infarction (NSTEMI) 2019     Past Surgical History:   Procedure Laterality Date    APPENDECTOMY N/A 2022    Procedure: APPENDECTOMY;  Surgeon: Rafa Cardozo MD;  [facility];  Service: Colon and Rectal;  Laterality: N/A;    ATRIAL CARDIAC PACEMAKER INSERTION      CECECTOMY N/A 2022    Procedure: EXCISION, CECUM;  Surgeon: Rafa Cardozo MD;  [facility];  Service: Colon and Rectal;  Laterality: N/A;     SECTION      CHOLECYSTECTOMY N/A 2022    Procedure: CHOLECYSTECTOMY;  Surgeon: Doug Epstein MD;  [facility];  Service: General;  Laterality: N/A;    COLONOSCOPY N/A 2022    Procedure: COLONOSCOPY;  Surgeon: Rafa Cardozo MD;  [facility];  Service: Colon and Rectal;  Laterality: N/A;    COLOSTOMY  2022    Procedure: CREATION, COLOSTOMY;  Surgeon: Rafa Cardozo MD;  [facility];  Service: Colon  and Rectal;;    CORONARY STENT PLACEMENT      LEFT HEART CATHETERIZATION Left 2022    Procedure: CATHETERIZATION, HEART, LEFT;  Surgeon: Yohannes Cordova MD;  Location: [facility] CATH LAB;  Service: Cardiology;  Laterality: Left;     Family History     Problem Relation (Age of Onset)    Emphysema Father    Heart attack Brother    Heart disease Mother        Tobacco Use    Smoking status: Former Smoker     Packs/day: 0.50     Start date: 1976     Quit date: 2012     Years since quittin.6    Smokeless tobacco: Never Used   Substance and Sexual Activity    Alcohol use: No    Drug use: No    Sexual activity: Not on file     Review of Systems  Objective:     Vital Signs (Most Recent):  Temp: 98.1 °F (36.7 °C) (22 0717)  Pulse: 99 (22 1132)  Resp: 18 (22 1208)  BP: 120/62 (22 1132)  SpO2: 96 % (22 1132) Vital Signs (24h Range):  Temp:  [98.1 °F (36.7 °C)] 98.1 °F (36.7 °C)  Pulse:  [] 99  Resp:  [18-24] 18  SpO2:  [96 %-99 %] 96 %  BP: (105-128)/(55-82) 120/62     Weight: 53.7 kg (118 lb 6.2 oz)  Body mass index is 20.97 kg/m².    Physical Exam  Gen: Laying in bed, in NAD  Resp: Mild tachypnea, increased work of breathing  Abd: Soft, nondistended. Midline wound approximately 2x3 cm, with some serosanguinous drainage, some granulation in bed. Ostomy in place over left hemiabdomen, pink/viable, patent, stool in bag  Msk: No tenderness or edema      Significant Labs:  BMP:   Recent Labs   Lab 22  0809   GLU 99      K 5.3*      CO2 24   BUN 19   CREATININE 0.7   CALCIUM 8.8   MG 1.7     CBC:   Recent Labs   Lab 22  0809   WBC 2.25*   RBC 3.85*   HGB 10.4*   HCT 33.3*      MCV 87   MCH 27.0   MCHC 31.2*       Significant Diagnostics:    Imaging Results          X-Ray Chest AP Portable (Final result)  Result time 22 10:27:14    Final result by Felice Willingham MD (22 10:27:14)                 Impression:      No convincing  evidence of acute detrimental change relative prior radiograph performed 07/18/2022.      Electronically signed by: Felice Willingham  Date:    08/01/2022  Time:    10:27             Narrative:    EXAMINATION:  XR CHEST AP PORTABLE    CLINICAL HISTORY:  Shortness of breath    TECHNIQUE:  Single frontal view of the chest was performed.    COMPARISON:  Chest radiograph 07/18/2022    FINDINGS:  Monitoring leads overlie the chest.  Left subclavian approach AICD, as before.  Enlarged cardiac silhouette again noted.  Interstitial prominent opacities are noted, which may be on a chronic basis, with persistent pulmonary vascular congestion/edema not excluded.  Overall relatively similar appearance to 07/18/2022 study.    No definite pneumothorax or large volume pleural effusion.  No acute findings identified in the visualized abdomen.  Osseous and soft tissue structures appear without definite acute abnormality.                               CT Abdomen Pelvis With Contrast (Final result)  Result time 08/01/22 10:51:06    Final result by Kirsten Gunter MD (08/01/22 10:51:06)                 Impression:      Mild stranding near the proximal duodenum and gastric pylorus.  Recommend correlation for gastroduodenitis.    Postoperative changes of partial colectomy with left-sided colostomy.    Soft tissue thickening with minimal air in the soft tissues overlying the sacrococcygeal region, correlating with patient's known wound.    Small volume pleural fluid bilaterally increased compared to prior.    Continued reticular opacification of the visualized lung bases similar to prior which may represent interstitial lung disease, pulmonary edema, or other infectious/noninfectious inflammatory process.    Previously noted midline anterior abdominal fluid collection demonstrates decreased fluid component compared to prior, with minimal air and is similar in size.  No significant sized drainable abscess.    Electronically signed by  resident: Braxton Aquino  Date:    08/01/2022  Time:    09:57    Electronically signed by: Kirsten Gunter MD  Date:    08/01/2022  Time:    10:51             Narrative:    EXAMINATION:  CT ABDOMEN PELVIS WITH CONTRAST    CLINICAL HISTORY:  Abdominal pain, acute, nonlocalized;    TECHNIQUE:  Routine axial CT images of the abdomen and pelvis were obtained after administration 75cc of IV Omnipaque 350.    Coronal and Sagittal reformatted images were also obtained.    COMPARISON:  CT abdomen pelvis 07/18/2022, 06/06/2022    FINDINGS:  Continued reticular opacification of the visualized lung bases similar to prior.  Small volume bilateral pleural fluid increased from prior.  The visualized portions of the heart demonstrate no cardiomegaly or significant volume pericardial fluid.  Partially imaged right atrial and right ventricle leads.  Stable prominent cardiophrenic 0.8 cm lymph node (02:23).    The liver is normal in size and attenuation with no focal hepatic abnormality.  There is periportal edema.  Gallbladder is surgically absent.  There is no intra-or extrahepatic biliary ductal dilatation.    Small hiatal hernia.  Previously noted duodenal diverticulum is not well visualized this exam.  There is mild stranding about the proximal duodenum and gastric pylorus.  The spleen, pancreas, and adrenal glands are unremarkable.    Kidneys are normal in size and location.  There is symmetric enhancement.  No hydronephrosis.  No dilatation of the right ureter.  The visualized left ureter demonstrates no dilatation proximally.  Urinary bladder is partially distended and unremarkable.  The uterus demonstrates no significant abnormality.    The abdominal aorta is normal in course with extensive atherosclerotic disease.  Mild fusiform ectasia of the infrarenal aorta measuring up to 2.5 cm (2:77).  Multifocal mild narrowing of the infrarenal aorta.    Mild stable scattered mesenteric stranding or edema.  No significant dilatation  of the small bowel loops.  There are postoperative changes of reported prior partial right hemicolectomy and appendectomy.  There is mild wall thickening near the anastomosis (2:157)..  There are postoperative changes of sigmoid colectomy with left sided colostomy.  Rectal stump or Landry's pouch is unremarkable.  There is mild diffuse stool burden of the residual colon.  No free intraperitoneal air.    Osseous structures are intact without evidence of acute fracture.  Degenerative changes of spine most significant at L5-S1.    There are postoperative changes of the midline anterior abdominal wall.  At the anterior midline of the previously noted fluid and air collection is less conspicuous with less fluid attenuation however there is continued small foci of gas and soft tissue thickening measuring proximally 3.0 x 0.8 cm (2:101), with previous fluid air collection measuring 2.4 x 1.0  cm.    Generalized subcutaneous edema.  There is increased soft tissue density with minimal air in the soft tissues overlying the sacrococcygeal region, for instance series 2, image 155.  This is compatible with patient's known wound in this region.                                Assessment/Plan:     66F PMH CAD and PCI (1/2022), CHF (EF 20%), HTN, ICD, Chronic hypoxemic respiratory failure, MDD, cholecystitis and diverticulitis s/p open sigmoidectomy, cholecystectomy, takedown of colovesical and colovaginal fistuli, and partial cecectomy with appendectomy (5/9/22), presenting with SOB and abdominal discomfort    - Midline wound stable on physical exam and on imaging without drainable fluid collection. No indication for surgical intervention at this time. Continue wound care  - Ostomy care  - Medicine/cardiology management of HF and SOB  - Will follow    Discussed with attending Dr. Darci MOFFETT MD  Colorectal Surgery  [facility] - Emergency Dept

## 2022-08-01 NOTE — FIRST PROVIDER EVALUATION
Medical screening exam completed.  I have conducted a focused provider triage encounter, findings are as follows:    Brief history of present illness:  65 y/o F with history of HTN, ICD, hyperlipidemia, CAD, HFrEF (20%), COPD presents to the ED c/o abdominal pain and n/v.  She has an ostomy but states she had a BM from her rectum this morning.  She has been having n/v for the past 2 days, unable to tolerate anything by mouth.  She was able to tolerate a few small sips of water this morning. She reports chills, SOB, dysuria, headache, lightheadedness, pain to the stoma. She denies any melena or bright red stool.     Vitals:    08/01/22 0717   BP: 128/82   BP Location: Right arm   Patient Position: Sitting   Pulse: (!) 116   Resp: 18   Temp: 98.1 °F (36.7 °C)   TempSrc: Oral   SpO2: 99%   Weight: 53.7 kg (118 lb 6.2 oz)       Pertinent physical exam:  Chronically ill appearing. Tachycardic. Formed brown stool in ostomy. Generalized tenderness to palpation.     Brief workup plan:  Labs, CT    Preliminary workup initiated; this workup will be continued and followed by the physician or advanced practice provider that is assigned to the patient when roomed.

## 2022-08-01 NOTE — H&P
[facility] - Emergency Dept  [facility] Medicine  History & Physical    Patient Name: Odette Hart  MRN: [id number]  Patient Class: OP- Observation  Admission Date: 8/1/2022  Attending Physician: Abida Caban MD  Primary Care Provider: Braxton Barragan MD      Patient information was obtained from patient, past medical records and ER records.     Subjective:     Principal Problem:Asymptomatic bacteriuria    Chief Complaint:   Chief Complaint   Patient presents with    Abdominal Pain     Abdominal pain X1 day after eating hot sausage. Pt. Also reporting reporting vaginal and rectal bleeding.         HPI: Ms. Pino is a 66-year-old female with a past medical history that includes adiverticulitis s/p cystoscopy with BL ureteral catheters, open sigmoid colon resection, appendectomy, creation of end colostomy on 5/9/22, heart failure and AICD placement.  Patient presents to the emergency department due to abdominal pain which she states has been going on for the past 2 days after she had some hot sausage. She describes the abdominal pain as aching all over her abdomen she also states that she has been having increased output into her colostomy bag. Additionally patient states that the wound next to her colostomy bag has been draining foul-smelling fluid.  Patient also states that a week ago she had noticed some rectal bleeding but it has since resolved.  She does states that today she had a bowel movement from her rectum which is not something that usually happens.   Patient is also endorsing increasing shortness of breath and lower extremity swelling. She took double her Lasix dose yesterday but her SOB is persistent. She is seen by wound care as an outpatient 4x/week.     Per chart review, she has been readmitted multiple times since her sigmoidectomy, for wound care and SOB/HF exacerbation, most recently to [facility] from 7/18/22 - 7/23/22. There she was treated for a midline wound with wound care and 5 days of  abx, as well as for CHF with diuretics.      In the ED, Lab workup was significant for UTI as well as an elevated lactate. She was given antibiotics. She was evaluated by CRS who do not feel surgical intervention is warranted at this time.       Past Medical History:   Diagnosis Date    Acute coronary syndrome     Acute exacerbation of chronic obstructive pulmonary disease (COPD) 3/23/2022    Allergy     Arthritis     Cardiomyopathy     CHF (congestive heart failure)     Coronary artery disease     Coronary artery disease of native artery of native heart with stable angina pectoris 2021: [facility]: Cath: LAD: Proximal stent patent. LCX: Proximal 80%. LCX: Dominant. Moderate disease. LCX: HEVER 2.75 x 18 mm. Distal dissection. HEVER 2.75 x 22 mm.     Diverticulitis     Diverticulosis     Familial hypercholesterolemia 2022    Former smoker 2022    Heart attack     Heart disease     History of myocardial infarction 2022    Hyperlipidemia     Hypertension     Hypertension 2022    ICD (implantable cardioverter-defibrillator) in place     Non-ST elevation myocardial infarction (NSTEMI) 2019       Past Surgical History:   Procedure Laterality Date    APPENDECTOMY N/A 2022    Procedure: APPENDECTOMY;  Surgeon: Rafa Cardozo MD;  [facility];  Service: Colon and Rectal;  Laterality: N/A;    ATRIAL CARDIAC PACEMAKER INSERTION      CECECTOMY N/A 2022    Procedure: EXCISION, CECUM;  Surgeon: Rafa Cardozo MD;  [facility];  Service: Colon and Rectal;  Laterality: N/A;     SECTION      CHOLECYSTECTOMY N/A 2022    Procedure: CHOLECYSTECTOMY;  Surgeon: Doug Epstein MD;  [facility];  Service: General;  Laterality: N/A;    COLONOSCOPY N/A 2022    Procedure: COLONOSCOPY;  Surgeon: Rafa Cardozo MD;  [facility];  Service: Colon and Rectal;  Laterality: N/A;    COLOSTOMY   5/9/2022    Procedure: CREATION, COLOSTOMY;  Surgeon: Rafa Cardozo MD;  Location: [facility] OR [address] [place] [address];  Service: Colon and Rectal;;    CORONARY STENT PLACEMENT      LEFT HEART CATHETERIZATION Left 1/24/2022    Procedure: CATHETERIZATION, HEART, LEFT;  Surgeon: Yohannes Cordova MD;  Location: [facility] CATH LAB;  Service: Cardiology;  Laterality: Left;       Review of patient's allergies indicates:   Allergen Reactions    Augmentin [amoxicillin-pot clavulanate] Swelling     Not allergic to amoxicillin just a derivative of augmentin    Lisinopril Other (See Comments)     Fluid around heart    Losartan Other (See Comments)     High potassium    Shellfish containing products Anaphylaxis     Pt.states she is allergic to SEAFOOD since the age of 13    Levaquin [levofloxacin] Other (See Comments)     Very bad joint pain    Clindamycin Palpitations     Chest pain       No current facility-administered medications on file prior to encounter.     Current Outpatient Medications on File Prior to Encounter   Medication Sig    acetaminophen (TYLENOL) 500 MG tablet Take 2 tablets (1,000 mg total) by mouth every 8 (eight) hours as needed for Pain.    albuterol (PROVENTIL/VENTOLIN HFA) 90 mcg/actuation inhaler Inhale 2 puffs into the lungs every 6 (six) hours as needed for Wheezing. Rescue    ALPRAZolam (XANAX) 0.5 MG tablet Take 1 tablet (0.5 mg total) by mouth nightly as needed for Anxiety.    aspirin (ECOTRIN) 81 MG EC tablet Take 1 tablet (81 mg total) by mouth once daily.    carvediloL (COREG) 3.125 MG tablet Take 1 tablet (3.125 mg total) by mouth 2 (two) times daily.    clopidogreL (PLAVIX) 75 mg tablet Take 1 tablet (75 mg total) by mouth once daily.    collagenase (SANTYL) ointment Apply topically once daily.    docusate sodium (COLACE) 100 MG capsule Take 1 capsule (100 mg total) by mouth 2 (two) times daily.    esomeprazole (NEXIUM) 40 MG capsule Take 1 capsule (40 mg total) by mouth before breakfast.     ezetimibe (ZETIA) 10 mg tablet Take 1 tablet (10 mg total) by mouth every evening.    fluticasone propionate (FLONASE) 50 mcg/actuation nasal spray 1 spray (50 mcg total) by Each Nostril route once daily.    furosemide (LASIX) 20 MG tablet Take 1 tablet (20 mg total) by mouth once daily.    HYDROcodone-acetaminophen (NORCO) 5-325 mg per tablet Take 1 tablet by mouth every 12 (twelve) hours as needed for Pain.    nitroGLYCERIN (NITROSTAT) 0.4 MG SL tablet Place 1 tablet (0.4 mg total) under the tongue every 5 (five) minutes as needed for Chest pain.    ondansetron (ZOFRAN-ODT) 8 MG TbDL Take 1 tablet (8 mg total) by mouth every 12 (twelve) hours as needed (nausea).    polyethylene glycol (GLYCOLAX) 17 gram/dose powder Take 17 g by mouth once daily.    simethicone (MYLICON) 80 MG chewable tablet Take 1 tablet (80 mg total) by mouth every 8 (eight) hours as needed for Flatulence.    [DISCONTINUED] metoprolol succinate (TOPROL-XL) 25 MG 24 hr tablet Take 1 tablet (25 mg total) by mouth once daily.    [DISCONTINUED] pantoprazole (PROTONIX) 40 MG tablet Take 1 tablet (40 mg total) by mouth once daily.    [DISCONTINUED] prasugreL (EFFIENT) 10 mg Tab Take 1 tablet (10 mg total) by mouth once daily.    [DISCONTINUED] spironolactone (ALDACTONE) 25 MG tablet Take half a tablet (12.5 mg total) by mouth once daily.     Family History       Problem Relation (Age of Onset)    Emphysema Father    Heart attack Brother    Heart disease Mother          Tobacco Use    Smoking status: Former Smoker     Packs/day: 0.50     Start date: 1976     Quit date: 2012     Years since quittin.6    Smokeless tobacco: Never Used   Substance and Sexual Activity    Alcohol use: No    Drug use: No    Sexual activity: Not on file     Review of Systems   Constitutional:  Negative for activity change, appetite change, chills, diaphoresis, fatigue and fever.   HENT:  Negative for congestion, sinus pressure, sore throat and  tinnitus.    Eyes:  Negative for visual disturbance.   Respiratory:  Negative for cough, chest tightness, shortness of breath and wheezing.    Cardiovascular:  Negative for chest pain, palpitations and leg swelling.   Gastrointestinal:  Negative for abdominal distention, abdominal pain, nausea and vomiting.   Endocrine: Negative for polydipsia, polyphagia and polyuria.   Genitourinary:  Negative for dysuria.   Musculoskeletal:  Negative for arthralgias and back pain.   Skin:  Negative for rash and wound.   Neurological:  Negative for dizziness, syncope, light-headedness and headaches.   Psychiatric/Behavioral:  Negative for confusion.    Objective:     Vital Signs (Most Recent):  Temp: 98.1 °F (36.7 °C) (08/01/22 0717)  Pulse: 89 (08/01/22 1401)  Resp: 18 (08/01/22 1208)  BP: 110/63 (08/01/22 1401)  SpO2: 97 % (08/01/22 1401) Vital Signs (24h Range):  Temp:  [98.1 °F (36.7 °C)] 98.1 °F (36.7 °C)  Pulse:  [] 89  Resp:  [18-24] 18  SpO2:  [96 %-99 %] 97 %  BP: (105-128)/(55-82) 110/63     Weight: 53.7 kg (118 lb 6.2 oz)  Body mass index is 20.97 kg/m².    Physical Exam  Vitals and nursing note reviewed.   Constitutional:       General: She is not in acute distress.     Appearance: Normal appearance. She is well-developed. She is not ill-appearing or toxic-appearing.   HENT:      Head: Normocephalic and atraumatic.      Right Ear: External ear normal.      Left Ear: External ear normal.      Nose: Nose normal.      Mouth/Throat:      Pharynx: Uvula midline.   Eyes:      General: Lids are normal.      Extraocular Movements: EOM normal.      Conjunctiva/sclera: Conjunctivae normal.      Pupils: Pupils are equal, round, and reactive to light.   Neck:      Thyroid: No thyroid mass.      Vascular: No JVD.      Trachea: Trachea normal.   Cardiovascular:      Rate and Rhythm: Normal rate and regular rhythm.      Heart sounds: Normal heart sounds, S1 normal and S2 normal.   Pulmonary:      Effort: Pulmonary effort is  normal.      Breath sounds: Normal breath sounds.   Abdominal:      General: Bowel sounds are normal. There is no distension.      Palpations: Abdomen is soft.      Tenderness: There is no abdominal tenderness.   Musculoskeletal:      Cervical back: Full passive range of motion without pain, normal range of motion and neck supple.      Right lower leg: No edema.      Left lower leg: No edema.   Neurological:      Mental Status: She is alert.      Cranial Nerves: No cranial nerve deficit.      Sensory: No sensory deficit.   Psychiatric:         Speech: Speech normal.         Behavior: Behavior normal. Behavior is cooperative.         Thought Content: Thought content normal.         CRANIAL NERVES     CN III, IV, VI   Pupils are equal, round, and reactive to light.  Extraocular motions are normal.      Significant Labs: All pertinent labs within the past 24 hours have been reviewed.  Blood Culture: No results for input(s): LABBLOO in the last 48 hours.  CBC:   Recent Labs   Lab 08/01/22  0809   WBC 2.25*   HGB 10.4*   HCT 33.3*        CMP:   Recent Labs   Lab 08/01/22  0809      K 5.3*      CO2 24   GLU 99   BUN 19   CREATININE 0.7   CALCIUM 8.8   PROT 7.4   ALBUMIN 2.1*   BILITOT 1.0   ALKPHOS 128   AST 97*   ALT 30   ANIONGAP 11     Lactic Acid:   Recent Labs   Lab 08/01/22  0809   LACTATE 3.1*     Lipase: No results for input(s): LIPASE in the last 48 hours.  Magnesium:   Recent Labs   Lab 08/01/22  0809   MG 1.7     Urine Culture: No results for input(s): LABURIN in the last 48 hours.  Urine Studies:   Recent Labs   Lab 08/01/22  1052   COLORU Tammy   APPEARANCEUA Clear   PHUR 5.0   SPECGRAV >=1.030*   PROTEINUA 2+*   GLUCUA Negative   KETONESU Trace*   BILIRUBINUA Negative   OCCULTUA 1+*   NITRITE Negative   LEUKOCYTESUR Negative   RBCUA 38*   WBCUA 18*   BACTERIA Many*   SQUAMEPITHEL 4   HYALINECASTS 0       Significant Imaging: I have reviewed all pertinent imaging results/findings within the  past 24 hours.    Assessment/Plan:     Alteration in skin integrity in adult  takedown of colovesical and colovaginal fistuli, and partial cecectomy with appendectomy (5/9/22)  CRS recs:   - Midline wound stable on physical exam and on imaging without drainable fluid collection. No indication for surgical intervention at this time. Continue wound care  - Ostomy care  Per [facility] records d/c 7/23: Patient was treated for 5 days with IV antibiotics. Abdominal wall fluid collection spontaneously resolved. Symptoms thought to be from abdominal wall infection.      Asymptomatic bacteruria  - UA not suggestive of UTI. Would not give further abx. F/u Ucx. Trend lactate. Judicial IVFs 2/2 CHF.     Severe protein-calorie malnutrition   maximize oral intake     COPD (chronic obstructive pulmonary disease)  Chronic hypoxemic respiratory failure  -- on baseline 2L NC  - continue routine inhalers    CAD (coronary artery disease)  Continue aspirin/ plavix/ BB        Chronic congestive heart failure  Not in acute exacerbation.  EF 20%, G3DD  Continue GDMT      VTE Risk Mitigation (From admission, onward)    None          Abida Caban MD  Department of Hospital Medicine   [facility] - Emergency Dept

## 2022-08-01 NOTE — SUBJECTIVE & OBJECTIVE
Past Medical History:   Diagnosis Date    Acute coronary syndrome     Acute exacerbation of chronic obstructive pulmonary disease (COPD) 3/23/2022    Allergy     Arthritis     Cardiomyopathy     CHF (congestive heart failure)     Coronary artery disease     Coronary artery disease of native artery of native heart with stable angina pectoris 2021: [facility]: Cath: LAD: Proximal stent patent. LCX: Proximal 80%. LCX: Dominant. Moderate disease. LCX: HEVER 2.75 x 18 mm. Distal dissection. HEVER 2.75 x 22 mm.     Diverticulitis     Diverticulosis     Familial hypercholesterolemia 2022    Former smoker 2022    Heart attack     Heart disease     History of myocardial infarction 2022    Hyperlipidemia     Hypertension     Hypertension 2022    ICD (implantable cardioverter-defibrillator) in place     Non-ST elevation myocardial infarction (NSTEMI) 2019       Past Surgical History:   Procedure Laterality Date    APPENDECTOMY N/A 2022    Procedure: APPENDECTOMY;  Surgeon: Rafa Cardozo MD;  [facility]: [facility];  Service: Colon and Rectal;  Laterality: N/A;    ATRIAL CARDIAC PACEMAKER INSERTION      CECECTOMY N/A 2022    Procedure: EXCISION, CECUM;  Surgeon: Rafa Cardozo MD;  Location: [facility];  Service: Colon and Rectal;  Laterality: N/A;     SECTION      CHOLECYSTECTOMY N/A 2022    Procedure: CHOLECYSTECTOMY;  Surgeon: Doug Epstein MD;  Location: [facility];  Service: General;  Laterality: N/A;    COLONOSCOPY N/A 2022    Procedure: COLONOSCOPY;  Surgeon: Rafa Cardozo MD;  Location: [facility];  Service: Colon and Rectal;  Laterality: N/A;    COLOSTOMY  2022    Procedure: CREATION, COLOSTOMY;  Surgeon: Rafa Cardozo MD;  Location: [facility];  Service: Colon and Rectal;;    CORONARY STENT PLACEMENT      LEFT HEART CATHETERIZATION Left 2022    Procedure: CATHETERIZATION, HEART, LEFT;  Surgeon: Yohannes  MD Tasha;  [facility] CATH LAB;  Service: Cardiology;  Laterality: Left;       Review of patient's allergies indicates:   Allergen Reactions    Augmentin [amoxicillin-pot clavulanate] Swelling     Not allergic to amoxicillin just a derivative of augmentin    Lisinopril Other (See Comments)     Fluid around heart    Losartan Other (See Comments)     High potassium    Shellfish containing products Anaphylaxis     Pt.states she is allergic to SEAFOOD since the age of 13    Levaquin [levofloxacin] Other (See Comments)     Very bad joint pain    Clindamycin Palpitations     Chest pain       No current facility-administered medications on file prior to encounter.     Current Outpatient Medications on File Prior to Encounter   Medication Sig    acetaminophen (TYLENOL) 500 MG tablet Take 2 tablets (1,000 mg total) by mouth every 8 (eight) hours as needed for Pain.    albuterol (PROVENTIL/VENTOLIN HFA) 90 mcg/actuation inhaler Inhale 2 puffs into the lungs every 6 (six) hours as needed for Wheezing. Rescue    ALPRAZolam (XANAX) 0.5 MG tablet Take 1 tablet (0.5 mg total) by mouth nightly as needed for Anxiety.    aspirin (ECOTRIN) 81 MG EC tablet Take 1 tablet (81 mg total) by mouth once daily.    carvediloL (COREG) 3.125 MG tablet Take 1 tablet (3.125 mg total) by mouth 2 (two) times daily.    clopidogreL (PLAVIX) 75 mg tablet Take 1 tablet (75 mg total) by mouth once daily.    collagenase (SANTYL) ointment Apply topically once daily.    docusate sodium (COLACE) 100 MG capsule Take 1 capsule (100 mg total) by mouth 2 (two) times daily.    esomeprazole (NEXIUM) 40 MG capsule Take 1 capsule (40 mg total) by mouth before breakfast.    ezetimibe (ZETIA) 10 mg tablet Take 1 tablet (10 mg total) by mouth every evening.    fluticasone propionate (FLONASE) 50 mcg/actuation nasal spray 1 spray (50 mcg total) by Each Nostril route once daily.    furosemide (LASIX) 20 MG tablet Take 1 tablet (20 mg total) by mouth once daily.     HYDROcodone-acetaminophen (NORCO) 5-325 mg per tablet Take 1 tablet by mouth every 12 (twelve) hours as needed for Pain.    nitroGLYCERIN (NITROSTAT) 0.4 MG SL tablet Place 1 tablet (0.4 mg total) under the tongue every 5 (five) minutes as needed for Chest pain.    ondansetron (ZOFRAN-ODT) 8 MG TbDL Take 1 tablet (8 mg total) by mouth every 12 (twelve) hours as needed (nausea).    polyethylene glycol (GLYCOLAX) 17 gram/dose powder Take 17 g by mouth once daily.    simethicone (MYLICON) 80 MG chewable tablet Take 1 tablet (80 mg total) by mouth every 8 (eight) hours as needed for Flatulence.    [DISCONTINUED] metoprolol succinate (TOPROL-XL) 25 MG 24 hr tablet Take 1 tablet (25 mg total) by mouth once daily.    [DISCONTINUED] pantoprazole (PROTONIX) 40 MG tablet Take 1 tablet (40 mg total) by mouth once daily.    [DISCONTINUED] prasugreL (EFFIENT) 10 mg Tab Take 1 tablet (10 mg total) by mouth once daily.    [DISCONTINUED] spironolactone (ALDACTONE) 25 MG tablet Take half a tablet (12.5 mg total) by mouth once daily.     Family History       Problem Relation (Age of Onset)    Emphysema Father    Heart attack Brother    Heart disease Mother          Tobacco Use    Smoking status: Former Smoker     Packs/day: 0.50     Start date: 1976     Quit date: 2012     Years since quittin.6    Smokeless tobacco: Never Used   Substance and Sexual Activity    Alcohol use: No    Drug use: No    Sexual activity: Not on file     Review of Systems   Constitutional:  Negative for activity change, appetite change, chills, diaphoresis, fatigue and fever.   HENT:  Negative for congestion, sinus pressure, sore throat and tinnitus.    Eyes:  Negative for visual disturbance.   Respiratory:  Negative for cough, chest tightness, shortness of breath and wheezing.    Cardiovascular:  Negative for chest pain, palpitations and leg swelling.   Gastrointestinal:  Negative for abdominal distention, abdominal pain, nausea and vomiting.    Endocrine: Negative for polydipsia, polyphagia and polyuria.   Genitourinary:  Positive for dysuria (chronic/ intermittent).   Musculoskeletal:  Negative for arthralgias and back pain.   Skin:  Negative for rash and wound.   Neurological:  Negative for dizziness, syncope, light-headedness and headaches.   Psychiatric/Behavioral:  Negative for confusion.    Objective:     Vital Signs (Most Recent):  Temp: 98.1 °F (36.7 °C) (08/01/22 0717)  Pulse: 89 (08/01/22 1401)  Resp: 18 (08/01/22 1208)  BP: 110/63 (08/01/22 1401)  SpO2: 97 % (08/01/22 1401) Vital Signs (24h Range):  Temp:  [98.1 °F (36.7 °C)] 98.1 °F (36.7 °C)  Pulse:  [] 89  Resp:  [18-24] 18  SpO2:  [96 %-99 %] 97 %  BP: (105-128)/(55-82) 110/63     Weight: 53.7 kg (118 lb 6.2 oz)  Body mass index is 20.97 kg/m².    Physical Exam  Vitals and nursing note reviewed.   Constitutional:       General: She is not in acute distress.     Appearance: Normal appearance. She is well-developed. She is not ill-appearing or toxic-appearing.   HENT:      Head: Normocephalic and atraumatic.      Right Ear: External ear normal.      Left Ear: External ear normal.      Nose: Nose normal.      Mouth/Throat:      Pharynx: Uvula midline.   Eyes:      General: Lids are normal.      Extraocular Movements: EOM normal.      Conjunctiva/sclera: Conjunctivae normal.      Pupils: Pupils are equal, round, and reactive to light.   Neck:      Thyroid: No thyroid mass.      Vascular: No JVD.      Trachea: Trachea normal.   Cardiovascular:      Rate and Rhythm: Normal rate and regular rhythm.      Heart sounds: Normal heart sounds, S1 normal and S2 normal.   Pulmonary:      Effort: Pulmonary effort is normal.      Breath sounds: Normal breath sounds.   Abdominal:      General: Bowel sounds are normal. There is no distension.      Palpations: Abdomen is soft.      Tenderness: There is no abdominal tenderness.      Comments: Midline surgical scar with wound dehiscence around level of  ostomy. No sign of infection. No drainage.     Musculoskeletal:      Cervical back: Full passive range of motion without pain, normal range of motion and neck supple.      Right lower leg: No edema.      Left lower leg: No edema.   Neurological:      Mental Status: She is alert.      Cranial Nerves: No cranial nerve deficit.      Sensory: No sensory deficit.   Psychiatric:         Speech: Speech normal.         Behavior: Behavior normal. Behavior is cooperative.         Thought Content: Thought content normal.         CRANIAL NERVES     CN III, IV, VI   Pupils are equal, round, and reactive to light.  Extraocular motions are normal.      Significant Labs: All pertinent labs within the past 24 hours have been reviewed.  Blood Culture: No results for input(s): LABBLOO in the last 48 hours.  CBC:   Recent Labs   Lab 08/01/22  0809   WBC 2.25*   HGB 10.4*   HCT 33.3*        CMP:   Recent Labs   Lab 08/01/22  0809      K 5.3*      CO2 24   GLU 99   BUN 19   CREATININE 0.7   CALCIUM 8.8   PROT 7.4   ALBUMIN 2.1*   BILITOT 1.0   ALKPHOS 128   AST 97*   ALT 30   ANIONGAP 11     Lactic Acid:   Recent Labs   Lab 08/01/22  0809   LACTATE 3.1*     Lipase: No results for input(s): LIPASE in the last 48 hours.  Magnesium:   Recent Labs   Lab 08/01/22  0809   MG 1.7     Urine Culture: No results for input(s): LABURIN in the last 48 hours.  Urine Studies:   Recent Labs   Lab 08/01/22  1052   COLORU Tammy   APPEARANCEUA Clear   PHUR 5.0   SPECGRAV >=1.030*   PROTEINUA 2+*   GLUCUA Negative   KETONESU Trace*   BILIRUBINUA Negative   OCCULTUA 1+*   NITRITE Negative   LEUKOCYTESUR Negative   RBCUA 38*   WBCUA 18*   BACTERIA Many*   SQUAMEPITHEL 4   HYALINECASTS 0       Significant Imaging: I have reviewed all pertinent imaging results/findings within the past 24 hours.

## 2022-08-01 NOTE — ED NOTES
Patient c/o 8/10 LLQ stoma pain she describes as sharp and aching along with some periodic burning sensation.  Order received from MD to medicate her with Morphine 4mg IVP - will monitor effectiveness.  Zofran 4mg IVP administered for nausea.

## 2022-08-01 NOTE — PHARMACY MED REC
"Admission Medication History     The home medication history was taken by Melissa Jarrett.    You may go to "Admission" then "Reconcile Home Medications" tabs to review and/or act upon these items.      The home medication list has been updated by the Pharmacy department.    Please read ALL comments highlighted in yellow.    Please address this information as you see fit.     Feel free to contact us if you have any questions or require assistance.    Medications listed below were obtained from: Patient/family    Thank you,  Melissa Jarrett  [phone]    .   No current facility-administered medications on file prior to encounter.     Current Outpatient Medications on File Prior to Encounter   Medication Sig    aspirin (ECOTRIN) 81 MG EC tablet Take 1 tablet (81 mg total) by mouth once daily.    carvediloL (COREG) 3.125 MG tablet Take 1 tablet (3.125 mg total) by mouth 2 (two) times daily.    clopidogreL (PLAVIX) 75 mg tablet Take 1 tablet (75 mg total) by mouth once daily.    docusate sodium (COLACE) 100 MG capsule Take 1 capsule (100 mg total) by mouth 2 (two) times daily.    esomeprazole (NEXIUM) 40 MG capsule Take 1 capsule (40 mg total) by mouth before breakfast.    ezetimibe (ZETIA) 10 mg tablet Take 1 tablet (10 mg total) by mouth every evening.    furosemide (LASIX) 20 MG tablet Take 1 tablet (20 mg total) by mouth once daily.    spironolactone (ALDACTONE) 25 MG tablet Take 12.5 mg by mouth once daily.    acetaminophen (TYLENOL) 500 MG tablet Take 2 tablets (1,000 mg total) by mouth every 8 (eight) hours as needed for Pain.    albuterol (PROVENTIL/VENTOLIN HFA) 90 mcg/actuation inhaler Inhale 2 puffs into the lungs every 6 (six) hours as needed for Wheezing. Rescue    ALPRAZolam (XANAX) 0.5 MG tablet Take 1 tablet (0.5 mg total) by mouth nightly as needed for Anxiety.    collagenase (SANTYL) ointment Apply topically once daily.    fluticasone propionate (FLONASE) 50 mcg/actuation nasal spray " 1 spray (50 mcg total) by Each Nostril route once daily.    HYDROcodone-acetaminophen (NORCO) 5-325 mg per tablet Take 1 tablet by mouth every 12 (twelve) hours as needed for Pain.    nitroGLYCERIN (NITROSTAT) 0.4 MG SL tablet Place 1 tablet (0.4 mg total) under the tongue every 5 (five) minutes as needed for Chest pain.    ondansetron (ZOFRAN-ODT) 8 MG TbDL Take 1 tablet (8 mg total) by mouth every 12 (twelve) hours as needed (nausea).    polyethylene glycol (GLYCOLAX) 17 gram/dose powder Take 17 g by mouth once daily.    simethicone (MYLICON) 80 MG chewable tablet Take 1 tablet (80 mg total) by mouth every 8 (eight) hours as needed for Flatulence.    [DISCONTINUED] metoprolol succinate (TOPROL-XL) 25 MG 24 hr tablet Take 1 tablet (25 mg total) by mouth once daily.    [DISCONTINUED] pantoprazole (PROTONIX) 40 MG tablet Take 1 tablet (40 mg total) by mouth once daily.    [DISCONTINUED] prasugreL (EFFIENT) 10 mg Tab Take 1 tablet (10 mg total) by mouth once daily.

## 2022-08-02 NOTE — PROGRESS NOTES
[facility] Medicine  Progress Note    Patient Name: Odette Hart  MRN: [id number]  Patient Class: OP- Observation   Admission Date: 8/1/2022  Length of Stay: 0 days  Attending Physician: Abida Caban MD  Primary Care Provider: Braxton Barragan MD      Subjective:     Principal Problem:Asymptomatic bacteriuria      HPI:  Ms. Pino is a 66-year-old female with a past medical history that includes adiverticulitis s/p cystoscopy with BL ureteral catheters, open sigmoid colon resection, appendectomy, creation of end colostomy on 5/9/22, heart failure and AICD placement.  Patient presents to the emergency department due to abdominal pain which she states has been going on for the past 2 days after she had some hot sausage. She describes the abdominal pain as aching all over her abdomen she also states that she has been having increased output into her colostomy bag. Additionally patient states that the wound next to her colostomy bag has been draining foul-smelling fluid.  Patient also states that a week ago she had noticed some rectal bleeding but it has since resolved.  She does states that today she had a bowel movement from her rectum which is not something that usually happens.   Patient is also endorsing increasing shortness of breath and lower extremity swelling. She took double her Lasix dose yesterday but her SOB is persistent. She is seen by wound care as an outpatient 4x/week.     Per chart review, she has been readmitted multiple times since her sigmoidectomy, for wound care and SOB/HF exacerbation, most recently to [facility] from 7/18/22 - 7/23/22. There she was treated for a midline wound with wound care and 5 days of abx, as well as for CHF with diuretics.      In the ED, Lab workup was significant for UTI as well as an elevated lactate. She was given antibiotics. She was evaluated by CRS who do not feel surgical intervention is warranted at this time.       Interval History:  Patient verbalized understanding of discharge instructions and need to follow up with cardiology. No further questions upon discharge. "NAEON. Still feels SOB. "I feel like I still have fluid".       Review of Systems   Constitutional:  Positive for fatigue. Negative for chills and fever.   Respiratory:  Positive for shortness of breath.    Cardiovascular:  Negative for chest pain.   Gastrointestinal:  Negative for abdominal pain.   Genitourinary:  Negative for dysuria.   Neurological:  Positive for weakness. Negative for headaches.   Psychiatric/Behavioral:  Negative for confusion.      Objective:     Vital Signs (Most Recent):  Temp: 97.4 °F (36.3 °C) (08/02/22 1201)  Pulse: 76 (08/02/22 1201)  Resp: 18 (08/02/22 1201)  BP: 101/62 (08/02/22 1201)  SpO2: 100 % (08/02/22 1201) Vital Signs (24h Range):  Temp:  [97.4 °F (36.3 °C)-98.3 °F (36.8 °C)] 97.4 °F (36.3 °C)  Pulse:  [74-85] 76  Resp:  [16-18] 18  SpO2:  [97 %-100 %] 100 %  BP: ()/(52-68) 101/62     Weight: 53.7 kg (118 lb 6.2 oz)  Body mass index is 20.97 kg/m².    Intake/Output Summary (Last 24 hours) at 8/2/2022 1509  Last data filed at 8/1/2022 2353  Gross per 24 hour   Intake 377.37 ml   Output --   Net 377.37 ml      Physical Exam  Vitals and nursing note reviewed.   Constitutional:       General: She is not in acute distress.     Appearance: She is well-developed.      Interventions: Nasal cannula in place.   HENT:      Head: Normocephalic and atraumatic.   Eyes:      Conjunctiva/sclera: Conjunctivae normal.   Cardiovascular:      Rate and Rhythm: Normal rate and regular rhythm.      Heart sounds: Normal heart sounds.   Pulmonary:      Effort: Pulmonary effort is normal.      Breath sounds: Normal breath sounds.   Abdominal:      General: Bowel sounds are normal. There is no distension.      Palpations: Abdomen is soft.      Tenderness: There is no abdominal tenderness.   Musculoskeletal:      Cervical back: Neck supple.      Right lower leg: No edema.      Left lower leg: No edema.   Neurological:      Mental Status: She is alert.   Psychiatric:         Behavior: Behavior normal. "       Significant Labs: All pertinent labs within the past 24 hours have been reviewed.  CBC:   Recent Labs   Lab 08/01/22  0809   WBC 2.25*   HGB 10.4*   HCT 33.3*        CMP:   Recent Labs   Lab 08/01/22  0809 08/02/22  0359    133*   K 5.3* 5.1    98   CO2 24 25   GLU 99 76   BUN 19 26*   CREATININE 0.7 0.8   CALCIUM 8.8 8.5*   PROT 7.4  --    ALBUMIN 2.1*  --    BILITOT 1.0  --    ALKPHOS 128  --    AST 97*  --    ALT 30  --    ANIONGAP 11 10     Cardiac Markers:   Recent Labs   Lab 08/02/22  1243   BNP 2,596*     Lactic Acid:   Recent Labs   Lab 08/01/22  0809 08/02/22  1243   LACTATE 3.1* 2.1       Significant Imaging: I have reviewed all pertinent imaging results/findings within the past 24 hours.      Assessment/Plan:      COPD (chronic obstructive pulmonary disease)  Dyspnea  -- she denies that she is on home O2. Checked d dimer due to symptoms as she does not appear fluid overloaded. D dimer very elevated. Check CTA.   - continue routine inhalers    Alteration in skin integrity in adult  takedown of colovesical and colovaginal fistuli, and partial cecectomy with appendectomy (5/9/22)  CRS recs:   - Midline wound stable on physical exam and on imaging without drainable fluid collection. No indication for surgical intervention at this time. Continue wound care  - Ostomy care  [facility] d/c 7/23: Patient was treated for 5 days with IV antibiotics. Abdominal wall fluid collection spontaneously resolved. Symptoms thought to be from abdominal wall infection.     - will need to continue outpt wound care upon dc.      Asymptomatic bacteruria  - UA not suggestive of UTI. Would not give further abx. F/u Ucx. Has had 3 negative ucx in last 5 months.      Severe protein-calorie malnutrition   maximize oral intake     CAD (coronary artery disease)  Continue aspirin/ plavix/ BB     Chronic congestive heart failure  Not in acute exacerbation.  EF 20%, G3DD  Continue GDMT  Cards consulted at  patient request      VTE Risk Mitigation (From admission, onward)         Ordered     Place sequential compression device  Until discontinued         08/01/22 0339                Discharge Planning   SIVAN: 8/3/2022     Code Status: Full Code   Is the patient medically ready for discharge?: No    Reason for patient still in hospital (select all that apply): Patient trending condition, Laboratory test, Treatment and Consult recommendations. Will hold dc today pending further investigation of SOB.             Abida Caban MD  Department of Hospital Medicine   [facility] S[facility]

## 2022-08-02 NOTE — CONSULTS
[facility] [place]  Consultation  Cardiology      Odette Hart  YOB: 1955  Medical Record Number:  [id number]  Attending Physician:  Abida Caban MD   Date of Admission: 8/1/2022       Hospital Day:  0  Current Principal Problem:  Asymptomatic bacteriuria      History     Cc:  Heart fails extubation    HPI  [age] year female with a history ischemic cardiomyopathy status post PCI to the prox and mid left circ with 2.75 x 18 mm and 2.75 x 22 mm resolute marlee stent 01/24/2022, heart failure reduced EF-20% status post AICD, hypertension, hyperlipidemia, adiverticulitis s/p cystoscopy with BL ureteral catheters, open sigmoid colon resection, appendectomy, creation of end colostomy on 5/9/22 who presents to hospital secondary to worsening shortness of breath, generalized weakness, nausea/vomiting abdominal pain. Per chart review, she has been readmitted multiple times since her sigmoidectomy, for wound care and SOB/HF exacerbation, most recently to [facility] from 7/18/22 - 7/23/22. There she was treated for a midline wound with wound care and 5 days of abx.  Patient's dyspnea has been worsening and she requested Cardiology consultation regarding heart failure.  She is lying in bed, dyspneic appearing on oxygen nasal cannula.  CTA chest x-ray showing pulmonary edema with pulmonary vascular congestion.  She has JVD past to tragus of her ear on exam with 1+ edema in lower extremities bilaterally.      Medications - Outpatient  Prior to Admission medications    Medication Sig Start Date End Date Taking? Authorizing Provider   aspirin (ECOTRIN) 81 MG EC tablet Take 1 tablet (81 mg total) by mouth once daily. 1/26/22  Yes Nghia Hunt MD   carvediloL (COREG) 3.125 MG tablet Take 1 tablet (3.125 mg total) by mouth 2 (two) times daily. 7/23/22 7/23/23 Yes Brandon Anderson MD   clopidogreL (PLAVIX) 75 mg tablet Take 1 tablet (75 mg total) by mouth once daily. 6/24/22  Yes Sanjuanita  MD Anali   docusate sodium (COLACE) 100 MG capsule Take 1 capsule (100 mg total) by mouth 2 (two) times daily. 6/24/22  Yes Sanjuanita Briones MD   esomeprazole (NEXIUM) 40 MG capsule Take 1 capsule (40 mg total) by mouth before breakfast. 6/30/22 6/30/23 Yes Braxton Barragan MD   ezetimibe (ZETIA) 10 mg tablet Take 1 tablet (10 mg total) by mouth every evening. 6/24/22 6/24/23 Yes Sanjuanita Briones MD   furosemide (LASIX) 20 MG tablet Take 1 tablet (20 mg total) by mouth once daily. 6/24/22  Yes Sanjuanita Briones MD   spironolactone (ALDACTONE) 25 MG tablet Take 12.5 mg by mouth once daily.   Yes Historical Provider   acetaminophen (TYLENOL) 500 MG tablet Take 2 tablets (1,000 mg total) by mouth every 8 (eight) hours as needed for Pain. 1/26/22   Nghia Hunt MD   albuterol (PROVENTIL/VENTOLIN HFA) 90 mcg/actuation inhaler Inhale 2 puffs into the lungs every 6 (six) hours as needed for Wheezing. Rescue 8/2/21 8/2/22  Scout Smith MD   ALPRAZolam (XANAX) 0.5 MG tablet Take 1 tablet (0.5 mg total) by mouth nightly as needed for Anxiety. 6/30/22   Braxton Barragan MD   collagenase (SANTYL) ointment Apply topically once daily. 6/24/22   Sanjuanita Briones MD   fluticasone propionate (FLONASE) 50 mcg/actuation nasal spray 1 spray (50 mcg total) by Each Nostril route once daily. 8/2/21   Scout Smith MD   HYDROcodone-acetaminophen (NORCO) 5-325 mg per tablet Take 1 tablet by mouth every 12 (twelve) hours as needed for Pain. 7/28/22   Scout Smith MD   nitroGLYCERIN (NITROSTAT) 0.4 MG SL tablet Place 1 tablet (0.4 mg total) under the tongue every 5 (five) minutes as needed for Chest pain. 1/26/22 1/26/23  Nghia Hunt MD   ondansetron (ZOFRAN-ODT) 8 MG TbDL Take 1 tablet (8 mg total) by mouth every 12 (twelve) hours as needed (nausea). 6/30/22   Braxton Barragan MD   polyethylene glycol (GLYCOLAX) 17 gram/dose powder Take 17 g by mouth once daily. 6/24/22   Sanjuanita Briones MD   simethicone (MYLICON) 80 MG  chewable tablet Take 1 tablet (80 mg total) by mouth every 8 (eight) hours as needed for Flatulence. 6/30/22   Braxton Barragan MD   metoprolol succinate (TOPROL-XL) 25 MG 24 hr tablet Take 1 tablet (25 mg total) by mouth once daily. 5/20/22 5/31/22  Carola Buckley NP   pantoprazole (PROTONIX) 40 MG tablet Take 1 tablet (40 mg total) by mouth once daily. 6/7/22 6/23/22  Braxton Barragan MD   prasugreL (EFFIENT) 10 mg Tab Take 1 tablet (10 mg total) by mouth once daily. 1/27/22 5/31/22  Nghia Hunt MD         Medications - Current  Scheduled Meds:   aspirin  81 mg Oral Daily    carvediloL  3.125 mg Oral BID    clopidogreL  75 mg Oral Daily    collagenase   Topical (Top) Daily    docusate sodium  100 mg Oral BID    furosemide  20 mg Oral Daily    ondansetron  8 mg Oral Once    pantoprazole  40 mg Oral Daily    spironolactone  25 mg Oral Daily     Continuous Infusions:  PRN Meds:.acetaminophen, albuterol-ipratropium, HYDROcodone-acetaminophen, melatonin, ondansetron, promethazine (PHENERGAN) IVPB, simethicone, sodium chloride 0.9%      Allergies  Review of patient's allergies indicates:   Allergen Reactions    Augmentin [amoxicillin-pot clavulanate] Swelling     Not allergic to amoxicillin just a derivative of augmentin    Lisinopril Other (See Comments)     Fluid around heart    Losartan Other (See Comments)     High potassium    Shellfish containing products Anaphylaxis     Pt.states she is allergic to SEAFOOD since the age of 13    Levaquin [levofloxacin] Other (See Comments)     Very bad joint pain    Clindamycin Palpitations     Chest pain         Past Medical History  Past Medical History:   Diagnosis Date    Acute coronary syndrome     Acute exacerbation of chronic obstructive pulmonary disease (COPD) 3/23/2022    Allergy     Arthritis     Cardiomyopathy     CHF (congestive heart failure)     Coronary artery disease     Coronary artery disease of native artery of native heart with  stable angina pectoris 2021: [facility]: Cath: LAD: Proximal stent patent. LCX: Proximal 80%. LCX: Dominant. Moderate disease. LCX: HEVER 2.75 x 18 mm. Distal dissection. HEVER 2.75 x 22 mm.     Diverticulitis     Diverticulosis     Familial hypercholesterolemia 2022    Former smoker 2022    Heart attack     Heart disease     History of myocardial infarction 2022    Hyperlipidemia     Hypertension     Hypertension 2022    ICD (implantable cardioverter-defibrillator) in place     Non-ST elevation myocardial infarction (NSTEMI) 2019         Past Surgical History  Past Surgical History:   Procedure Laterality Date    APPENDECTOMY N/A 2022    Procedure: APPENDECTOMY;  Surgeon: Rafa Cardozo MD;  Location: [facility] OR 2ND FLR;  Service: Colon and Rectal;  Laterality: N/A;    ATRIAL CARDIAC PACEMAKER INSERTION      CECECTOMY N/A 2022    Procedure: EXCISION, CECUM;  Surgeon: Rafa Cardozo MD;  Location: [facility] OR 2ND FLR;  Service: Colon and Rectal;  Laterality: N/A;     SECTION      CHOLECYSTECTOMY N/A 2022    Procedure: CHOLECYSTECTOMY;  Surgeon: Doug Epstein MD;  Location: [facility] OR 2ND FLR;  Service: General;  Laterality: N/A;    COLONOSCOPY N/A 2022    Procedure: COLONOSCOPY;  Surgeon: Rafa Cardozo MD;  Location: [facility] OR 2ND FLR;  Service: Colon and Rectal;  Laterality: N/A;    COLOSTOMY  2022    Procedure: CREATION, COLOSTOMY;  Surgeon: Rafa Cardozo MD;  Location: [facility] OR 2ND FLR;  Service: Colon and Rectal;;    CORONARY STENT PLACEMENT      LEFT HEART CATHETERIZATION Left 2022    Procedure: CATHETERIZATION, HEART, LEFT;  Surgeon: Yohannes Cordova MD;  Location: [facility] [place] CATH LAB;  Service: Cardiology;  Laterality: Left;         Social History  Social History     Socioeconomic History    Marital status:    Tobacco Use    Smoking status: Former Smoker     Packs/day: 0.50     Start date: 1976     Quit  date: 2012     Years since quittin.6    Smokeless tobacco: Never Used   Substance and Sexual Activity    Alcohol use: No    Drug use: No     Social Determinants of Health     Financial Resource Strain: Low Risk     Difficulty of Paying Living Expenses: Not hard at all   Food Insecurity: No Food Insecurity    Worried About Running Out of Food in the Last Year: Never true    Ran Out of Food in the Last Year: Never true   Transportation Needs: No Transportation Needs    Lack of Transportation (Medical): No    Lack of Transportation (Non-Medical): No   Physical Activity: Inactive    Days of Exercise per Week: 0 days    Minutes of Exercise per Session: 0 min   Stress: No Stress Concern Present    Feeling of Stress : Not at all   Social Connections: Socially Isolated    Frequency of Communication with Friends and Family: More than three times a week    Frequency of Social Gatherings with Friends and Family: Once a week    Attends Spiritism Services: Never    Active Member of Clubs or Organizations: No    Attends Club or Organization Meetings: Never    Marital Status:    Housing Stability: Low Risk     Unable to Pay for Housing in the Last Year: No    Number of Places Lived in the Last Year: 1    Unstable Housing in the Last Year: No         ROS   Admits Denies   Constitutional  Chills, diaphoresis, malaise   Eyes  Visual changes   ENMT  Dysphagia, Epistaxis, nasal congestion, hearing loss   Cardiovascular  Chest pain, palpitations, edema   Respiratory Dyspnea Cough, wheezing   Gastrointestinal Abdominal pain Nausea, vomiting, constipation, diarrhea, anorexia.     Genitourinary  Dysuria, incontinence   Musculoskeletal  Myalgias, joint pain, joint swelling   Integumentary  Rash, inflammation, burning   Neruo-Psychiatric  Anxiety, insomnia.  Changes in speech, strength, sensation.     Endocrine     Hematologic  Abnormal bruising, bleeding   Immunologic  Inflammation, pain at IV sites.   Pruritis.         Physical Examination         Vital Signs  Vitals  Temp: 97 °F (36.1 °C)  Temp src: Oral  Pulse: 80  Heart Rate Source: Monitor  Resp: 20  SpO2: 98 %  Flow (L/min): 2  Oxygen Concentration (%): 97  O2 Device (Oxygen Therapy): nasal cannula  BP: 108/60  MAP (mmHg): 78  BP Location: Right arm  BP Method: Automatic  Patient Position: Lying          24 Hour VS Range    Temp:  [97 °F (36.1 °C)-98.3 °F (36.8 °C)]   Pulse:  [74-85]   Resp:  [16-20]   BP: ()/(52-68)   SpO2:  [97 %-100 %]     Intake/Output Summary (Last 24 hours) at 8/2/2022 1854  Last data filed at 8/2/2022 1756  Gross per 24 hour   Intake 377.37 ml   Output 1050 ml   Net -672.63 ml         General:  Lying in bed, complaining of shortness of breath and discomfort.  Head: NCAT  Eyes: conjunctivae and lids normal, no scleral icterus, EOMI.  ENMT:  no gingival bleeding, normal oral mucosa without pallor or cyanosis.   Neck:  JVP distended past tragus of the ear.  Trachea non-displaced.     Chest:  Inspiratory crackles noted inferiorly bilaterally.  No wheezing or rhonchi.  Heart:  Normal PMI, S1 S2 normal quality, splitting.  No murmurs rubs or gallops.  Peripheral pulses 2+.  Abdomen:  Non-distended, normal bowel sounds, non-tender.  Positive hepato-jugular reflux.  Extremities:  1+ bilateral lower extremity edema. Normal capillary refill.    Skin:  Warm and dry.  No cyanosis or pallor.  No ulcers, stasis.  IV sites without tenderness or inflammation.    Neurological / Psychiatric:  Oriented to person, time, and place.  No facial asymmetry, drift.  Fluent without dysarthria.  Mood euthymic, affect normal.         Data       Recent Labs   Lab 08/01/22  0809   WBC 2.25*   HGB 10.4*   HCT 33.3*           No results for input(s): PROTIME, INR in the last 168 hours.     Recent Labs   Lab 08/01/22  0809 08/02/22  0359    133*   K 5.3* 5.1    98   CO2 24 25   BUN 19 26*   CREATININE 0.7 0.8   ANIONGAP 11 10   CALCIUM 8.8  8.5*        Recent Labs   Lab 08/01/22  0809   PROT 7.4   ALBUMIN 2.1*   BILITOT 1.0   ALKPHOS 128   AST 97*   ALT 30        No results for input(s): TROPONINI in the last 168 hours.     BNP (pg/mL)   Date Value   08/02/2022 2,596 (H)   07/18/2022 4,567 (H)   06/08/2022 >4,900 (H)   05/23/2022 3,340 (H)   05/12/2022 1,922 (H)             Assessment & Plan     Acute on chronic systolic/diastolic heart failure:  EF 20%  -recommend diuresis with Lasix 80 mg IV b.i.d.  -continue aspirin, Plavix, and statin if tolerated  -may switch to metoprolol succinate 25 daily for better heart rate control.  Patient's blood pressure is in the low 100s to 90s systolic on low-dose Coreg.  -monitor urine output electrolytes.  Please diurese 1-2 L per day and maintain Net neg 1.5-2L daily  -maintain potassium greater than 4 and magnesium greater than 2  -repeat echo  -optimize guideline directed medical therapy prior to discharge.  If patient can tolerate Entresto, will recommend starting this while inpatient.  Otherwise will attempt to do low-dose losartan with outpatient initiation of Entresto.  -patient will need Jardiance at discharge as well.    Ischemic cardiomyopathy EF 30%  Left heart catheterization from January reviewed.  Her LAD was not intervened upon.  -will follow patient closely.  If she develops chest pain or decompensates she may need to be evaluated by Interventional Cardiology for possible left heart catheterization and intervention of her LAD.  -continue management as above  -cardiology will follow up with her in a.m.        Signed:  Lucio Yates M.D.  Page # [phone]  Cardiovascular Fellow  [facility]

## 2022-08-02 NOTE — SUBJECTIVE & OBJECTIVE
"Interval History: NAEON. Still feels SOB. "I feel like I still have fluid".       Review of Systems   Constitutional:  Positive for fatigue. Negative for chills and fever.   Respiratory:  Positive for shortness of breath.    Cardiovascular:  Negative for chest pain.   Gastrointestinal:  Negative for abdominal pain.   Genitourinary:  Negative for dysuria.   Neurological:  Positive for weakness. Negative for headaches.   Psychiatric/Behavioral:  Negative for confusion.      Objective:     Vital Signs (Most Recent):  Temp: 97.4 °F (36.3 °C) (08/02/22 1201)  Pulse: 76 (08/02/22 1201)  Resp: 18 (08/02/22 1201)  BP: 101/62 (08/02/22 1201)  SpO2: 100 % (08/02/22 1201) Vital Signs (24h Range):  Temp:  [97.4 °F (36.3 °C)-98.3 °F (36.8 °C)] 97.4 °F (36.3 °C)  Pulse:  [74-85] 76  Resp:  [16-18] 18  SpO2:  [97 %-100 %] 100 %  BP: ()/(52-68) 101/62     Weight: 53.7 kg (118 lb 6.2 oz)  Body mass index is 20.97 kg/m².    Intake/Output Summary (Last 24 hours) at 8/2/2022 1509  Last data filed at 8/1/2022 2353  Gross per 24 hour   Intake 377.37 ml   Output --   Net 377.37 ml      Physical Exam  Vitals and nursing note reviewed.   Constitutional:       General: She is not in acute distress.     Appearance: She is well-developed.      Interventions: Nasal cannula in place.   HENT:      Head: Normocephalic and atraumatic.   Eyes:      Conjunctiva/sclera: Conjunctivae normal.   Neck:      Vascular: No JVD.   Cardiovascular:      Rate and Rhythm: Normal rate and regular rhythm.      Heart sounds: Normal heart sounds.   Pulmonary:      Effort: Pulmonary effort is normal.      Breath sounds: Normal breath sounds.   Abdominal:      General: Bowel sounds are normal. There is no distension.      Palpations: Abdomen is soft.      Tenderness: There is no abdominal tenderness.   Musculoskeletal:      Cervical back: Neck supple.      Right lower leg: No edema.      Left lower leg: No edema.   Neurological:      Mental Status: She is alert. "   Psychiatric:         Behavior: Behavior normal.       Significant Labs: All pertinent labs within the past 24 hours have been reviewed.  CBC:   Recent Labs   Lab 08/01/22  0809   WBC 2.25*   HGB 10.4*   HCT 33.3*        CMP:   Recent Labs   Lab 08/01/22  0809 08/02/22  0359    133*   K 5.3* 5.1    98   CO2 24 25   GLU 99 76   BUN 19 26*   CREATININE 0.7 0.8   CALCIUM 8.8 8.5*   PROT 7.4  --    ALBUMIN 2.1*  --    BILITOT 1.0  --    ALKPHOS 128  --    AST 97*  --    ALT 30  --    ANIONGAP 11 10     Cardiac Markers:   Recent Labs   Lab 08/02/22  1243   BNP 2,596*     Lactic Acid:   Recent Labs   Lab 08/01/22  0809 08/02/22  1243   LACTATE 3.1* 2.1       Significant Imaging: I have reviewed all pertinent imaging results/findings within the past 24 hours.

## 2022-08-02 NOTE — ED NOTES
"Pt complaining of ostomy bag at this time. Pt states " I don't want to change it. It's the middle of the night." Encouraged pt to allow staff to assist in change of bag. Bag is full and malodorous with poor positioning over stoma. Ordered supplies.   "

## 2022-08-02 NOTE — PROGRESS NOTES
08/02/22 1320   WOCN Assessment   WOCN Total Time (mins) 15   Visit Date 08/02/22   Visit Time 1320   Consult Type New   WOCN Speciality Wound   Intervention assessed;changed;applied;chart review;coordination of care;consult other service;orders        Altered Skin Integrity 05/09/22 1630 Sacral spine Full thickness tissue loss. Base is covered by slough and/or eschar in the wound bed   Date First Assessed/Time First Assessed: 05/09/22 1630   Altered Skin Integrity Present on Admission: yes  Location: Sacral spine  Description of Altered Skin Integrity: Full thickness tissue loss. Base is covered by slough and/or eschar in the wound bed   Wound Image    Drainage Amount Small   Red (%), Wound Tissue Color 95 %   Yellow (%), Wound Tissue Color 5 %   Wound Length (cm) 1.5 cm   Wound Width (cm) 1.5 cm   Wound Depth (cm) 1.5 cm   Wound Volume (cm^3) 3.375 cm^3   Wound Surface Area (cm^2) 2.25 cm^2        Incision/Site 05/09/22 1135 Abdomen   Date First Assessed/Time First Assessed: 05/09/22 1135   Present Prior to Hospital Arrival?: Yes  Location: Abdomen   Wound Image    Drainage Amount Moderate   Red (%), Wound Tissue Color 90 %   Yellow (%), Wound Tissue Color 10 %   Wound Length (cm) 3.5 cm   Wound Width (cm) 3.5 cm   Wound Depth (cm) 1.5 cm   Wound Volume (cm^3) 18.375 cm^3   Wound Surface Area (cm^2) 12.25 cm^2   Undermining (depth (cm)/location) 3.5@ 6 o'clock   Patient consult for wound care to abdomen and sacral area. Abdoment site is cleanse with normal saline and loosely packed with hydrofera blue ready and covered with dry clean dressing. The sacral are is cleanse with normal saline and loosely packed with aquacel ag then covered with dry clean dressing daily and prn for saturation.

## 2022-08-02 NOTE — ED NOTES
Requested piperacillin-tazobactam 4.5 g in sodium chloride 0.9% 100 mL IVPB (ready to mix system)  4.5 g  :  Intravenous from pharmacy. Unable to be pulled from ED pyxis. Per pharmacy medication to be tubed to ED 21 tube station.

## 2022-08-02 NOTE — PLAN OF CARE
[facility] - Med Surg  Initial Discharge Assessment       Primary Care Provider: Braxton Barragan MD    Admission Diagnosis: Shortness of breath [R06.02]  Abnormal laboratory test [R89.9]  Urinary tract infection without hematuria, site unspecified [N39.0]  Cellulitis, unspecified cellulitis site [L03.90]    Admission Date: 8/1/2022  Expected Discharge Date: 8/3/2022    Discharge Barriers Identified: None    Payor: [employer] / Plan: Blip 65 / Product Type: Medicare Advantage /     Extended Emergency Contact Information  Primary Emergency Contact: Abelardo Ortiz  Mobile Phone: [phone]  Relation: Son  Secondary Emergency Contact: Suri Ortiz  Mobile Phone: [phone]  Relation: Daughter    Discharge Plan A: Home Health  Discharge Plan B: Home with family      [facility] - [place], [place] - [address]  [address]  [place] [address]  Phone: [phone] Fax: [phone]    [facility]  [address]  [facility] [address]  Phone: [phone] Fax: [phone]      Initial Assessment (most recent)     Adult Discharge Assessment - 08/02/22 1604        Discharge Assessment    Assessment Type Discharge Planning Assessment     Confirmed/corrected address, phone number and insurance Yes     Confirmed Demographics Correct on Facesheet     Source of Information patient     Does patient/caregiver understand observation status Yes     Communicated SIVAN with patient/caregiver Yes     Reason For Admission SOB     Lives With alone     Facility Arrived From: Home     Do you expect to return to your current living situation? Yes     Do you have help at home or someone to help you manage your care at home? Yes     Who are your caregiver(s) and their phone number(s)? Son Abelardo Ortiz     Prior to hospitilization cognitive status: Alert/Oriented;No Deficits     Current cognitive status: Alert/Oriented;No Deficits      Walking or Climbing Stairs Difficulty none     Dressing/Bathing Difficulty none     Equipment Currently Used at Home walker, rolling     Readmission within 30 days? No     Patient currently being followed by outpatient case management? No     Do you currently have service(s) that help you manage your care at home? No     Do you take prescription medications? Yes     Do you have prescription coverage? Yes     Do you have any problems affording any of your prescribed medications? No     Is the patient taking medications as prescribed? yes     Who is going to help you get home at discharge? May need ride     How do you get to doctors appointments? family or friend will provide;car, drives self     Are you on dialysis? No     Do you take coumadin? No     Discharge Plan A Home Health     Discharge Plan B Home with family     DME Needed Upon Discharge  none     Discharge Plan discussed with: Patient     Discharge Barriers Identified None

## 2022-08-02 NOTE — PLAN OF CARE
Problem: Adult Inpatient Plan of Care  Goal: Plan of Care Review  Outcome: Ongoing, Progressing  Flowsheets (Taken 8/2/2022 1040)  Plan of Care Reviewed With: patient  Goal: Optimal Comfort and Wellbeing  Outcome: Ongoing, Progressing  Intervention: Monitor Pain and Promote Comfort  Flowsheets (Taken 8/2/2022 1040)  Pain Management Interventions:   pillow support provided   medication offered  Goal: Readiness for Transition of Care  Outcome: Ongoing, Progressing     Problem: Adult Inpatient Plan of Care  Goal: Optimal Comfort and Wellbeing  Intervention: Monitor Pain and Promote Comfort  Flowsheets (Taken 8/2/2022 1040)  Pain Management Interventions:   pillow support provided   medication offered     Patient arrived to room, awake and alert, no signs of distress noted, breathing even and unlabored, oriented to room and call bell system, bed in low position, call bell within reach, patient complains of nausea, no Iv access, pt states she is a hard stick, awaiting IV team of new access, Dr. Caban notified for new orders for nausea.

## 2022-08-02 NOTE — ED NOTES
I have assumed care of the pt from JUAN Cam. Two pt identifiers verbally confirmed w/ pt; pt is Odette Hart,  1955.

## 2022-08-02 NOTE — NURSING
Patient arrived to room, awake and alert, no signs of distress noted, breathing even and unlabored, oriented to room and call bell system, bed in low position, call bell within reach, patient complains of nausea, no Iv access, pt states she is a hard stick, awaiting IV team of new access, Dr. Caban notified for new orders for nausea.

## 2022-08-03 NOTE — SUBJECTIVE & OBJECTIVE
Interval History: Patient continuing to diurese. No increase in SOB or Chestpain     Review of Systems   Constitutional: Positive for malaise/fatigue and weight gain. Negative for decreased appetite.   HENT:  Negative for congestion and ear pain.    Cardiovascular:  Positive for leg swelling and orthopnea.   Respiratory:  Positive for shortness of breath. Negative for cough.    Skin:  Negative for color change and nail changes.   Musculoskeletal:  Negative for arthritis and back pain.   Gastrointestinal:  Negative for bloating and abdominal pain.   Genitourinary:  Negative for bladder incontinence and dysuria.   Neurological:  Negative for aphonia and brief paralysis.   Psychiatric/Behavioral:  Negative for altered mental status and depression.    Objective:     Vital Signs (Most Recent):  Temp: 97.4 °F (36.3 °C) (08/03/22 0726)  Pulse: 67 (08/03/22 0726)  Resp: 16 (08/03/22 0726)  BP: (!) 100/53 (08/03/22 0726)  SpO2: 98 % (08/03/22 0726)   Vital Signs (24h Range):  Temp:  [97 °F (36.1 °C)-98 °F (36.7 °C)] 97.4 °F (36.3 °C)  Pulse:  [67-81] 67  Resp:  [16-20] 16  SpO2:  [98 %-100 %] 98 %  BP: ()/(53-62) 100/53     Weight: 53.7 kg (118 lb 6.2 oz)  Body mass index is 20.97 kg/m².     SpO2: 98 %  O2 Device (Oxygen Therapy): nasal cannula      Intake/Output Summary (Last 24 hours) at 8/3/2022 1010  Last data filed at 8/2/2022 1756  Gross per 24 hour   Intake --   Output 850 ml   Net -850 ml       Lines/Drains/Airways       Drain  Duration                  Colostomy 05/09/22 1100 Descending/sigmoid LLQ 85 days              Peripheral Intravenous Line  Duration                  Midline Catheter Insertion/Assessment  - Single Lumen 07/20/22 1600 Left basilic vein (medial side of arm)  13 days         Peripheral IV - Single Lumen 08/02/22 1145 20 G;1 3/4 in Left Upper Arm <1 day                    Physical Exam  Vitals reviewed.   Constitutional:       General: She is not in acute distress.     Appearance: Normal  appearance.   HENT:      Head: Normocephalic and atraumatic.      Right Ear: External ear normal.      Left Ear: External ear normal.      Nose: Nose normal.      Mouth/Throat:      Pharynx: Oropharynx is clear.   Eyes:      Conjunctiva/sclera: Conjunctivae normal.   Cardiovascular:      Rate and Rhythm: Normal rate.      Pulses: Normal pulses.   Pulmonary:      Effort: Pulmonary effort is normal.      Breath sounds: Rales present.   Abdominal:      General: Abdomen is flat. There is no distension.      Palpations: Abdomen is soft.   Musculoskeletal:      Right lower leg: Edema present.      Left lower leg: Edema present.   Neurological:      General: No focal deficit present.      Mental Status: She is oriented to person, place, and time.   Psychiatric:         Mood and Affect: Mood normal.         Behavior: Behavior normal.       Significant Labs: All pertinent lab results from the last 24 hours have been reviewed.    Significant Imaging: EKG: reviewed

## 2022-08-03 NOTE — NURSING
Verified with CT that patient is to remain NPO for CTA today. After CTA she can have a cardia diet ordered. Will continue to monitor patient.

## 2022-08-03 NOTE — ASSESSMENT & PLAN NOTE
1 L out yesterday  Echo in May  · The left ventricle is severely enlarged with eccentric hypertrophy and severely decreased systolic function.  · Severe left atrial enlargement.  · Grade III left ventricular diastolic dysfunction.  · Normal right ventricular size with normal right ventricular systolic function.  · Mild-to-moderate mitral regurgitation.  · Mild tricuspid regurgitation.  · There is mild pulmonary hypertension.      -Recommend increase from Lasix 80 mg IV b.i.d. to 120 IV BID  -continue aspirin, Plavix, and statin if tolerated  -may switch to metoprolol succinate 25 daily for better heart rate control.  Patient's blood pressure is in the low 100s to 90s systolic on low-dose Coreg.  -monitor urine output electrolytes.  Please diurese 1-2 L per day and maintain Net neg 1.5-2L daily  -maintain potassium greater than 4 and magnesium greater than 2  -repeat echo  -optimize guideline directed medical therapy prior to discharge.  If patient can tolerate Entresto, will recommend starting this while inpatient.  Otherwise will attempt to do low-dose losartan with outpatient initiation of Entresto.  -patient will need Jardiance at discharge as well.

## 2022-08-03 NOTE — PROGRESS NOTES
[facility] - Med S[facility]  Cardiology  Progress Note    Patient Name: Odette Hart  MRN: [id number]  Admission Date: 8/1/2022  Hospital Length of Stay: 0 days  Code Status: Full Code   Attending Physician: Abida Caban MD   Primary Care Physician: Braxton Barragan MD  Expected Discharge Date: 8/3/2022  Principal Problem:Asymptomatic bacteriuria    Subjective:     Hospital Course:   Started on Lasix 80 IV BID, diuresing well and clinically improving     Interval History: Patient continuing to diurese. No increase in SOB or Chestpain     Review of Systems   Constitutional: Positive for malaise/fatigue and weight gain. Negative for decreased appetite.   HENT:  Negative for congestion and ear pain.    Cardiovascular:  Positive for leg swelling and orthopnea.   Respiratory:  Positive for shortness of breath. Negative for cough.    Skin:  Negative for color change and nail changes.   Musculoskeletal:  Negative for arthritis and back pain.   Gastrointestinal:  Negative for bloating and abdominal pain.   Genitourinary:  Negative for bladder incontinence and dysuria.   Neurological:  Negative for aphonia and brief paralysis.   Psychiatric/Behavioral:  Negative for altered mental status and depression.    Objective:     Vital Signs (Most Recent):  Temp: 97.4 °F (36.3 °C) (08/03/22 0726)  Pulse: 67 (08/03/22 0726)  Resp: 16 (08/03/22 0726)  BP: (!) 100/53 (08/03/22 0726)  SpO2: 98 % (08/03/22 0726)   Vital Signs (24h Range):  Temp:  [97 °F (36.1 °C)-98 °F (36.7 °C)] 97.4 °F (36.3 °C)  Pulse:  [67-81] 67  Resp:  [16-20] 16  SpO2:  [98 %-100 %] 98 %  BP: ()/(53-62) 100/53     Weight: 53.7 kg (118 lb 6.2 oz)  Body mass index is 20.97 kg/m².     SpO2: 98 %  O2 Device (Oxygen Therapy): nasal cannula      Intake/Output Summary (Last 24 hours) at 8/3/2022 1010  Last data filed at 8/2/2022 1756  Gross per 24 hour   Intake --   Output 850 ml   Net -850 ml       Lines/Drains/Airways       Drain  Duration                   Colostomy 05/09/22 1100 Descending/sigmoid LLQ 85 days              Peripheral Intravenous Line  Duration                  Midline Catheter Insertion/Assessment  - Single Lumen 07/20/22 1600 Left basilic vein (medial side of arm)  13 days         Peripheral IV - Single Lumen 08/02/22 1145 20 G;1 3/4 in Left Upper Arm <1 day                    Physical Exam  Vitals reviewed.   Constitutional:       General: She is not in acute distress.     Appearance: Normal appearance.   HENT:      Head: Normocephalic and atraumatic.      Right Ear: External ear normal.      Left Ear: External ear normal.      Nose: Nose normal.      Mouth/Throat:      Pharynx: Oropharynx is clear.   JVD present  Eyes:      Conjunctiva/sclera: Conjunctivae normal.   Cardiovascular:      Rate and Rhythm: Normal rate.      Pulses: Normal pulses.   Pulmonary:      Effort: Pulmonary effort is normal.      Breath sounds: Rales present.   Abdominal:      General: Abdomen is flat. There is no distension.      Palpations: Abdomen is soft.   Musculoskeletal:      Right lower leg: Edema present.      Left lower leg: Edema present.   Neurological:      General: No focal deficit present.      Mental Status: She is oriented to person, place, and time.   Psychiatric:         Mood and Affect: Mood normal.         Behavior: Behavior normal.       Significant Labs: All pertinent lab results from the last 24 hours have been reviewed.    Significant Imaging: EKG: reviewed    Assessment and Plan:     Brief HPI:   [age] year female with a history ischemic cardiomyopathy status post PCI to the prox and mid left circ with 2.75 x 18 mm and 2.75 x 22 mm resolute marlee stent 01/24/2022, heart failure reduced EF-20% status post AICD, hypertension, hyperlipidemia, adiverticulitis s/p cystoscopy with BL ureteral catheters, open sigmoid colon resection, appendectomy, creation of end colostomy on 5/9/22 who presents to hospital secondary to worsening shortness of breath,  generalized weakness, nausea/vomiting abdominal pain. Per chart review, she has been readmitted multiple times since her sigmoidectomy, for wound care and SOB/HF exacerbation, most recently to [facility] from 7/18/22 - 7/23/22. There she was treated for a midline wound with wound care and 5 days of abx.  Patient's dyspnea has been worsening and she requested Cardiology consultation regarding heart failure.  She is lying in bed, dyspneic appearing on oxygen nasal cannula.  CTA chest x-ray showing pulmonary edema with pulmonary vascular congestion.  She has JVD past to tragus of her ear on exam with 1+ edema in lower extremities bilaterally.    Prolonged Q-T interval on ECG  EKG reviewed    Acute on chronic systolic heart failure  1 L out yesterday, Clinically improving    · The left ventricle is severely enlarged with eccentric hypertrophy and severely decreased systolic function.  · Severe left atrial enlargement.  · Grade III left ventricular diastolic dysfunction.  · Normal right ventricular size with normal right ventricular systolic function.  · Mild-to-moderate mitral regurgitation.  · Mild tricuspid regurgitation.  · There is mild pulmonary hypertension.      -Continue Lasix 80 mg IV b.i.d.  -continue aspirin, Plavix, and statin if tolerated  -Switch Coreg to metoprolol succinate 25 daily  -Please diurese 1-2 L per day and maintain Net neg 1.5-2L daily  -maintain potassium greater than 4 and magnesium greater than 2  -repeat echo  -optimize guideline directed medical therapy prior to discharge.  If patient can tolerate Entresto, will recommend starting this while inpatient.  Otherwise will attempt to do low-dose losartan with outpatient initiation of Entresto.  -patient will need Jardiance at discharge as well.    History of myocardial infarction  See CAD    Coronary artery disease of native artery of native heart with stable angina pectoris  1/24/2022: [facility]: Cath: LAD: Proximal stent patent. LCX: Proximal 80%.  LCX: Dominant. Moderate disease. LCX: HEVER 2.75 x 18 mm. Distal dissection. HEVER 2.75 x 22 mm.  She underwent PCI in January of this year and had a 70% mid LAD lesion not intervened upon. Chest pain may be due to volume overload.     -If still having chest pain when euvolemic, she may require revascularization.         RBBB with left anterior fascicular block  See prolonged QT    History of coronary artery stent placement      Automatic implantable cardioverter-defibrillator in situ  Noted hx    Ischemic cardiomyopathy  -Left heart catheterization from January reviewed.  Her LAD was not intervened upon.  -will follow patient closely.  If she develops chest pain or decompensates she may need to be evaluated by Interventional Cardiology for possible left heart catheterization and intervention of her LAD.          VTE Risk Mitigation (From admission, onward)         Ordered     Place sequential compression device  Until discontinued         08/01/22 0040                Balbir Zhong MD  Cardiology  [facility]

## 2022-08-03 NOTE — ASSESSMENT & PLAN NOTE
· The left ventricular end diastolic pressure was elevated.  · The pre-procedure left ventricular end diastolic pressure was 20.  · The Mid LAD lesion was 70% stenosed.  · The Prox Cx lesion was 80% stenosed with 0% stenosis post-intervention.  · The Mid Cx lesion was 90% stenosed with 0% stenosis post-intervention.  · The Prox RCA lesion was 50% stenosed.  · The RPDA lesion was 70% stenosed.  · The RPAV lesion was 60% stenosed.  · A STENT RESOLUTE AMBER [id number] stent was successfully placed at 16 PJ for 10 sec.  · A STENT RESOLUTE AMBER [id number] stent was successfully placed at 12 PJ for 15 sec.  · The estimated blood loss was <50 mL.  · There was three vessel coronary artery disease.  · The PCI was successful.

## 2022-08-03 NOTE — ASSESSMENT & PLAN NOTE
EKG 8/1    Vent. Rate : 108 BPM     Atrial Rate : 108 BPM      P-R Int : 122 ms          QRS Dur : 120 ms       QT Int : 366 ms       P-R-T Axes : 081 -33 078 degrees      QTc Int : 490 ms     Sinus tachycardia   Left axis deviation   Cannot exclude  Anterolateral infarct (cited on or before 16-JAN-2019)   Abnormal ECG   When compared with ECG of 18-JUL-2022 13:25,   QRS duration has decreased   Questionable change in initial forces of Lateral leads   T wave inversion no longer evident in Anterior leads   QT has shortened

## 2022-08-03 NOTE — HPI
[age] year female with a history ischemic cardiomyopathy status post PCI to the prox and mid left circ with 2.75 x 18 mm and 2.75 x 22 mm resolute marlee stent 01/24/2022, heart failure reduced EF-20% status post AICD, hypertension, hyperlipidemia, adiverticulitis s/p cystoscopy with BL ureteral catheters, open sigmoid colon resection, appendectomy, creation of end colostomy on 5/9/22 who presents to hospital secondary to worsening shortness of breath, generalized weakness, nausea/vomiting abdominal pain. Per chart review, she has been readmitted multiple times since her sigmoidectomy, for wound care and SOB/HF exacerbation, most recently to [facility] from 7/18/22 - 7/23/22. There she was treated for a midline wound with wound care and 5 days of abx.  Patient's dyspnea has been worsening and she requested Cardiology consultation regarding heart failure.  She is lying in bed, dyspneic appearing on oxygen nasal cannula.  CTA chest x-ray showing pulmonary edema with pulmonary vascular congestion.  She has JVD past to tragus of her ear on exam with 1+ edema in lower extremities bilaterally.

## 2022-08-03 NOTE — ASSESSMENT & PLAN NOTE
-Left heart catheterization from January reviewed.  Her LAD was not intervened upon.  -will follow patient closely.  If she develops chest pain or decompensates she may need to be evaluated by Interventional Cardiology for possible left heart catheterization and intervention of her LAD.

## 2022-08-03 NOTE — PROGRESS NOTES
[facility] Medicine  Progress Note    Patient Name: Odette Hart  MRN: [id number]  Patient Class: IP- Inpatient   Admission Date: 8/1/2022  Length of Stay: 0 days  Attending Physician: Abida Caban MD  Primary Care Provider: Braxton Barragan MD      Subjective:     Principal Problem:Asymptomatic bacteriuria      HPI:  Ms. Pino is a 66-year-old female with a past medical history that includes adiverticulitis s/p cystoscopy with BL ureteral catheters, open sigmoid colon resection, appendectomy, creation of end colostomy on 5/9/22, heart failure and AICD placement.  Patient presents to the emergency department due to abdominal pain which she states has been going on for the past 2 days after she had some hot sausage. She describes the abdominal pain as aching all over her abdomen she also states that she has been having increased output into her colostomy bag. Additionally patient states that the wound next to her colostomy bag has been draining foul-smelling fluid.  Patient also states that a week ago she had noticed some rectal bleeding but it has since resolved.  She does states that today she had a bowel movement from her rectum which is not something that usually happens.   Patient is also endorsing increasing shortness of breath and lower extremity swelling. She took double her Lasix dose yesterday but her SOB is persistent. She is seen by wound care as an outpatient 4x/week.     Per chart review, she has been readmitted multiple times since her sigmoidectomy, for wound care and SOB/HF exacerbation, most recently to [facility] from 7/18/22 - 7/23/22. There she was treated for a midline wound with wound care and 5 days of abx, as well as for CHF with diuretics.      In the ED, Lab workup was significant for UTI as well as an elevated lactate. She was given antibiotics. She was evaluated by CRS who do not feel surgical intervention is warranted at this time.       Interval History: RASHMI.  Still feels chest pressure, but SOB is improved. Has appetite now.        Review of Systems   Constitutional:  Negative for chills and fever.   Respiratory:  Positive for chest tightness (pressure) and shortness of breath.    Cardiovascular:  Negative for chest pain.   Gastrointestinal:  Positive for nausea (intermittent). Negative for abdominal pain.   Genitourinary:  Negative for dysuria.   Neurological:  Negative for headaches.   Psychiatric/Behavioral:  Negative for confusion.      Objective:     Vital Signs (Most Recent):  Temp: 97.9 °F (36.6 °C) (08/03/22 1548)  Pulse: 75 (08/03/22 1548)  Resp: 16 (08/03/22 1548)  BP: (!) 98/53 (08/03/22 1548)  SpO2: 99 % (08/03/22 1548) Vital Signs (24h Range):  Temp:  [97.4 °F (36.3 °C)-98 °F (36.7 °C)] 97.9 °F (36.6 °C)  Pulse:  [67-81] 75  Resp:  [16-20] 16  SpO2:  [98 %-100 %] 99 %  BP: ()/(53-58) 98/53     Weight: 53.7 kg (118 lb 6.2 oz)  Body mass index is 20.97 kg/m².    Intake/Output Summary (Last 24 hours) at 8/3/2022 1559  Last data filed at 8/2/2022 1756  Gross per 24 hour   Intake --   Output 200 ml   Net -200 ml        Physical Exam  Vitals and nursing note reviewed.   Constitutional:       General: She is not in acute distress.     Appearance: She is well-developed.      Interventions: Nasal cannula in place.   HENT:      Head: Normocephalic and atraumatic.   Eyes:      Conjunctiva/sclera: Conjunctivae normal.   Neck:      Vascular: No JVD.   Cardiovascular:      Rate and Rhythm: Normal rate and regular rhythm.      Heart sounds: Normal heart sounds.   Pulmonary:      Effort: Pulmonary effort is normal.      Breath sounds: Normal breath sounds.   Abdominal:      General: Bowel sounds are normal. There is no distension.      Palpations: Abdomen is soft.      Tenderness: There is no abdominal tenderness.   Musculoskeletal:      Cervical back: Neck supple.      Right lower leg: No edema.      Left lower leg: No edema.   Neurological:      Mental Status: She  is alert.   Psychiatric:         Behavior: Behavior normal.       Significant Labs: All pertinent labs within the past 24 hours have been reviewed.  CBC:   No results for input(s): WBC, HGB, HCT, PLT in the last 48 hours.    CMP:   Recent Labs   Lab 08/02/22  0359 08/03/22  0544   * 134*   K 5.1 3.8   CL 98 97   CO2 25 27   GLU 76 111*   BUN 26* 20   CREATININE 0.8 0.8   CALCIUM 8.5* 8.5*   ANIONGAP 10 10       Cardiac Markers:   Recent Labs   Lab 08/02/22  1243   BNP 2,596*       Lactic Acid:   Recent Labs   Lab 08/02/22  1243   LACTATE 2.1         Significant Imaging: I have reviewed all pertinent imaging results/findings within the past 24 hours.      Assessment/Plan:      Acute on chronic systolic and diastolic congestive heart failure  BNP elevated. Cards following. Improved on high dose IV Lasix.   EF 20%, G3DD  Recs: optimize guideline directed medical therapy prior to discharge.  If patient can tolerate Entresto, will recommend starting this while inpatient.  Otherwise will attempt to do low-dose losartan with outpatient initiation of Entresto.  -patient will need Jardiance at discharge as well.  -recommend diuresis with Lasix 80 mg IV b.i.d.  -continue aspirin, Plavix, and statin if tolerated  -may switch to metoprolol succinate 25 daily for better heart rate control.      - discussed with cards-- poss angiogram while IP, will reassess after diuresis.       COPD (chronic obstructive pulmonary disease)  Dyspnea  D dimer very elevated. CTA neg for PE, but fluid overload seen.   - continue routine inhalers, diuresis        Alteration in skin integrity in adult  takedown of colovesical and colovaginal fistuli, and partial cecectomy with appendectomy (5/9/22)  CRS recs:   - Midline wound stable on physical exam and on imaging without drainable fluid collection. No indication for surgical intervention at this time. Continue wound care  - Ostomy care  Per [facility] records d/c 7/23: Patient was treated for 5  days with IV antibiotics. Abdominal wall fluid collection spontaneously resolved. Symptoms thought to be from abdominal wall infection.     - will need to continue outpt wound care upon dc.      Asymptomatic bacteruria  - UA not suggestive of UTI. Would not give further abx. F/u Ucx. Has had 3 negative ucx in last 5 months.      Severe protein-calorie malnutrition   maximize oral intake     CAD (coronary artery disease)  Continue aspirin/ plavix/ BB       VTE Risk Mitigation (From admission, onward)         Ordered     Place sequential compression device  Until discontinued         08/01/22 7386                Discharge Planning   SIVAN: 8/3/2022     Code Status: Full Code   Is the patient medically ready for discharge?: No    Reason for patient still in hospital (select all that apply): Patient trending condition, Treatment and Consult recommendations  Discharge Plan A: Home Health          Abida Caban MD  Department of Hospital Medicine   [facility]

## 2022-08-03 NOTE — SUBJECTIVE & OBJECTIVE
Interval History: NAEON. Still feels chest pressure, but SOB is improved. Has appetite now.        Review of Systems   Constitutional:  Negative for chills and fever.   Respiratory:  Positive for chest tightness (pressure) and shortness of breath.    Cardiovascular:  Negative for chest pain.   Gastrointestinal:  Positive for nausea (intermittent). Negative for abdominal pain.   Genitourinary:  Negative for dysuria.   Neurological:  Negative for headaches.   Psychiatric/Behavioral:  Negative for confusion.      Objective:     Vital Signs (Most Recent):  Temp: 97.9 °F (36.6 °C) (08/03/22 1548)  Pulse: 75 (08/03/22 1548)  Resp: 16 (08/03/22 1548)  BP: (!) 98/53 (08/03/22 1548)  SpO2: 99 % (08/03/22 1548) Vital Signs (24h Range):  Temp:  [97.4 °F (36.3 °C)-98 °F (36.7 °C)] 97.9 °F (36.6 °C)  Pulse:  [67-81] 75  Resp:  [16-20] 16  SpO2:  [98 %-100 %] 99 %  BP: ()/(53-58) 98/53     Weight: 53.7 kg (118 lb 6.2 oz)  Body mass index is 20.97 kg/m².    Intake/Output Summary (Last 24 hours) at 8/3/2022 1559  Last data filed at 8/2/2022 1756  Gross per 24 hour   Intake --   Output 200 ml   Net -200 ml        Physical Exam  Vitals and nursing note reviewed.   Constitutional:       General: She is not in acute distress.     Appearance: She is well-developed.      Interventions: Nasal cannula in place.   HENT:      Head: Normocephalic and atraumatic.   Eyes:      Conjunctiva/sclera: Conjunctivae normal.   Neck:      Vascular: No JVD.   Cardiovascular:      Rate and Rhythm: Normal rate and regular rhythm.      Heart sounds: Normal heart sounds.   Pulmonary:      Effort: Pulmonary effort is normal.      Breath sounds: Normal breath sounds.   Abdominal:      General: Bowel sounds are normal. There is no distension.      Palpations: Abdomen is soft.      Tenderness: There is no abdominal tenderness.   Musculoskeletal:      Cervical back: Neck supple.      Right lower leg: No edema.      Left lower leg: No edema.   Neurological:       Mental Status: She is alert.   Psychiatric:         Behavior: Behavior normal.       Significant Labs: All pertinent labs within the past 24 hours have been reviewed.  CBC:   No results for input(s): WBC, HGB, HCT, PLT in the last 48 hours.    CMP:   Recent Labs   Lab 08/02/22  0359 08/03/22  0544   * 134*   K 5.1 3.8   CL 98 97   CO2 25 27   GLU 76 111*   BUN 26* 20   CREATININE 0.8 0.8   CALCIUM 8.5* 8.5*   ANIONGAP 10 10       Cardiac Markers:   Recent Labs   Lab 08/02/22  1243   BNP 2,596*       Lactic Acid:   Recent Labs   Lab 08/02/22  1243   LACTATE 2.1         Significant Imaging: I have reviewed all pertinent imaging results/findings within the past 24 hours.

## 2022-08-03 NOTE — ASSESSMENT & PLAN NOTE
1/24/2022: [facility]: Cath: LAD: Proximal stent patent. LCX: Proximal 80%. LCX: Dominant. Moderate disease. LCX: HEVER 2.75 x 18 mm. Distal dissection. HEVER 2.75 x 22 mm.    She underwent PCI in January of this year and had a 70% mid LAD lesion not intervened upon. Chest pain may be due to volume overload. If still having chest pain when euvolemic, she may require revascularization.

## 2022-08-04 NOTE — PROGRESS NOTES
[facility] Medicine  Progress Note    Patient Name: Odette Hart  MRN: [id number]  Patient Class: IP- Inpatient   Admission Date: 8/1/2022  Length of Stay: 1 days  Attending Physician: Abida Caban MD  Primary Care Provider: Braxton Barragan MD      Subjective:     Principal Problem:Asymptomatic bacteriuria      HPI:  Ms. Pino is a 66-year-old female with a past medical history that includes adiverticulitis s/p cystoscopy with BL ureteral catheters, open sigmoid colon resection, appendectomy, creation of end colostomy on 5/9/22, heart failure and AICD placement.  Patient presents to the emergency department due to abdominal pain which she states has been going on for the past 2 days after she had some hot sausage. She describes the abdominal pain as aching all over her abdomen she also states that she has been having increased output into her colostomy bag. Additionally patient states that the wound next to her colostomy bag has been draining foul-smelling fluid.  Patient also states that a week ago she had noticed some rectal bleeding but it has since resolved.  She does states that today she had a bowel movement from her rectum which is not something that usually happens.   Patient is also endorsing increasing shortness of breath and lower extremity swelling. She took double her Lasix dose yesterday but her SOB is persistent. She is seen by wound care as an outpatient 4x/week.     Per chart review, she has been readmitted multiple times since her sigmoidectomy, for wound care and SOB/HF exacerbation, most recently to [facility] from 7/18/22 - 7/23/22. There she was treated for a midline wound with wound care and 5 days of abx, as well as for CHF with diuretics.      In the ED, Lab workup was significant for UTI as well as an elevated lactate. She was given antibiotics. She was evaluated by CRS who do not feel surgical intervention is warranted at this time.       Interval History: RASHMI.  Still feels chest pressure/ SOB.       Review of Systems   Constitutional:  Negative for chills and fever.   Respiratory:  Positive for chest tightness (pressure) and shortness of breath.    Cardiovascular:  Negative for chest pain.   Gastrointestinal:  Positive for nausea (intermittent). Negative for abdominal pain.   Genitourinary:  Negative for dysuria.   Neurological:  Negative for headaches.   Psychiatric/Behavioral:  Negative for confusion.      Objective:     Vital Signs (Most Recent):  Temp: 97.5 °F (36.4 °C) (08/04/22 1149)  Pulse: 82 (08/04/22 1149)  Resp: 18 (08/04/22 1149)  BP: 112/75 (08/04/22 1149)  SpO2: 100 % (08/04/22 1149) Vital Signs (24h Range):  Temp:  [97.5 °F (36.4 °C)-98.5 °F (36.9 °C)] 97.5 °F (36.4 °C)  Pulse:  [18-89] 82  Resp:  [16-19] 18  SpO2:  [93 %-100 %] 100 %  BP: ()/(53-75) 112/75     Weight: 53.7 kg (118 lb 6.2 oz)  Body mass index is 20.97 kg/m².    Intake/Output Summary (Last 24 hours) at 8/4/2022 1430  Last data filed at 8/4/2022 0106  Gross per 24 hour   Intake --   Output 3500 ml   Net -3500 ml        Physical Exam  Vitals and nursing note reviewed.   Constitutional:       General: She is not in acute distress.     Appearance: She is well-developed.      Interventions: Nasal cannula in place.   HENT:      Head: Normocephalic and atraumatic.   Eyes:      Conjunctiva/sclera: Conjunctivae normal.   Neck:      Vascular: No JVD.   Cardiovascular:      Rate and Rhythm: Normal rate and regular rhythm.      Heart sounds: Normal heart sounds.   Pulmonary:      Effort: Pulmonary effort is normal.      Breath sounds: Normal breath sounds.   Abdominal:      General: Bowel sounds are normal. There is no distension.      Palpations: Abdomen is soft.      Tenderness: There is no abdominal tenderness.   Musculoskeletal:      Cervical back: Neck supple.      Right lower leg: No edema.      Left lower leg: No edema.   Neurological:      Mental Status: She is alert.   Psychiatric:          Behavior: Behavior normal.       Significant Labs: All pertinent labs within the past 24 hours have been reviewed.  CBC:   No results for input(s): WBC, HGB, HCT, PLT in the last 48 hours.    CMP:   Recent Labs   Lab 08/03/22  0544 08/04/22  0244   * 135*   K 3.8 3.0*   CL 97 92*   CO2 27 33*   * 108   BUN 20 11   CREATININE 0.8 0.7   CALCIUM 8.5* 8.2*   ANIONGAP 10 10       Cardiac Markers:   No results for input(s): CKMB, MYOGLOBIN, BNP, TROPISTAT in the last 48 hours.    Lactic Acid:   No results for input(s): LACTATE in the last 48 hours.      Significant Imaging: I have reviewed all pertinent imaging results/findings within the past 24 hours.      Assessment/Plan:      Acute on chronic systolic and diastolic congestive heart failure  BNP elevated. Cards following. Improved on high dose IV Lasix- continue. Replace K++.   EF 20%, G3DD  Recs: Transition to 80 po BID  -Strict Is and Os, is not recorded  -Order Echocardio  -continue ASA, Plavix  -Initiate Statin  -metoprolol succinate 25 daily  -Resume her low dose Entresto  -maintain potassium greater than 4 and magnesium greater than 2-Replete  -Jardiance at discharge        COPD (chronic obstructive pulmonary disease)  Dyspnea  D dimer very elevated. CTA neg for PE, but fluid overload seen.   - continue routine inhalers, diuresis         Alteration in skin integrity in adult  takedown of colovesical and colovaginal fistuli, and partial cecectomy with appendectomy (5/9/22)  CRS recs:   - Midline wound stable on physical exam and on imaging without drainable fluid collection. No indication for surgical intervention at this time. Continue wound care  - Ostomy care  Per [facility] records d/c 7/23: Patient was treated for 5 days with IV antibiotics. Abdominal wall fluid collection spontaneously resolved. Symptoms thought to be from abdominal wall infection.     - will need to continue outpt wound care upon dc.      Asymptomatic bacteruria  - UA not  suggestive of UTI. Would not give further abx. F/u Ucx. Has had 3 negative ucx in last 5 months.      Severe protein-calorie malnutrition   maximize oral intake     CAD (coronary artery disease)  Continue aspirin/ plavix/ BB  Possible IP angiogram if continued chest pressure      VTE Risk Mitigation (From admission, onward)         Ordered     Place sequential compression device  Until discontinued         08/01/22 4785                Discharge Planning   SIVAN: 8/8/2022     Code Status: Full Code   Is the patient medically ready for discharge?: No    Reason for patient still in hospital (select all that apply): Patient trending condition, Treatment and Consult recommendations  Discharge Plan A: Home Health           Abida Caban MD  Department of Hospital Medicine   [facility]

## 2022-08-04 NOTE — HOSPITAL COURSE
Diuresed using IV lasix. Echo repeated which showed The left ventricle is moderately enlarged with eccentric hypertrophy and severely decreased systolic function.The estimated ejection fraction is 10%.  Grade II left ventricular diastolic dysfunction.Moderate left atrial enlargement.Moderate mitral regurgitation.  No obvious endocarditisNormal right ventricular size with low normal right ventricular systolic function.  Mild to moderate tricuspid regurgitation.Mild aortic regurgitation.There are segmental left ventricular wall motion abnormalities. Akinetic apex. No thrombus.

## 2022-08-04 NOTE — ASSESSMENT & PLAN NOTE
Left heart catheterization from January reviewed.  Her LAD was not intervened upon.  will follow patient closely.  If she develops chest pain or decompensates she may need to be evaluated by Interventional Cardiology for possible left heart catheterization and intervention of her LAD    -Continue to monitor for any new chest pain

## 2022-08-04 NOTE — ASSESSMENT & PLAN NOTE
EKG 8/1 Vent. Rate : 108 BPM     Atrial Rate : 108 BPM  P-R Int : 122 ms     QRS Dur : 120 ms   QT Int : 366 ms    P-R-T Axes : 081 -33 078 degrees QTc Int : 490 ms   Sinus tachycardia Left axis deviation Cannot exclude  Anterolateral infarct (cited on or before 16-JAN-2019) Abnormal ECG When compared with ECG of 18-JUL-2022 13:25,   QRS duration has decreased Questionable change in initial forces of Lateral leads   T wave inversion no longer evident in Anterior leads QT has shortened     -Avoid QT prolonging medicines as able

## 2022-08-04 NOTE — SUBJECTIVE & OBJECTIVE
Interval History: NAEON. Still feels chest pressure/ SOB.       Review of Systems   Constitutional:  Negative for chills and fever.   Respiratory:  Positive for chest tightness (pressure) and shortness of breath.    Cardiovascular:  Negative for chest pain.   Gastrointestinal:  Positive for nausea (intermittent). Negative for abdominal pain.   Genitourinary:  Negative for dysuria.   Neurological:  Negative for headaches.   Psychiatric/Behavioral:  Negative for confusion.      Objective:     Vital Signs (Most Recent):  Temp: 97.5 °F (36.4 °C) (08/04/22 1149)  Pulse: 82 (08/04/22 1149)  Resp: 18 (08/04/22 1149)  BP: 112/75 (08/04/22 1149)  SpO2: 100 % (08/04/22 1149) Vital Signs (24h Range):  Temp:  [97.5 °F (36.4 °C)-98.5 °F (36.9 °C)] 97.5 °F (36.4 °C)  Pulse:  [18-89] 82  Resp:  [16-19] 18  SpO2:  [93 %-100 %] 100 %  BP: ()/(53-75) 112/75     Weight: 53.7 kg (118 lb 6.2 oz)  Body mass index is 20.97 kg/m².    Intake/Output Summary (Last 24 hours) at 8/4/2022 1430  Last data filed at 8/4/2022 0106  Gross per 24 hour   Intake --   Output 3500 ml   Net -3500 ml        Physical Exam  Vitals and nursing note reviewed.   Constitutional:       General: She is not in acute distress.     Appearance: She is well-developed.      Interventions: Nasal cannula in place.   HENT:      Head: Normocephalic and atraumatic.   Eyes:      Conjunctiva/sclera: Conjunctivae normal.   Neck:      Vascular: No JVD.   Cardiovascular:      Rate and Rhythm: Normal rate and regular rhythm.      Heart sounds: Normal heart sounds.   Pulmonary:      Effort: Pulmonary effort is normal.      Breath sounds: Normal breath sounds.   Abdominal:      General: Bowel sounds are normal. There is no distension.      Palpations: Abdomen is soft.      Tenderness: There is no abdominal tenderness.   Musculoskeletal:      Cervical back: Neck supple.      Right lower leg: No edema.      Left lower leg: No edema.   Neurological:      Mental Status: She is  alert.   Psychiatric:         Behavior: Behavior normal.       Significant Labs: All pertinent labs within the past 24 hours have been reviewed.  CBC:   No results for input(s): WBC, HGB, HCT, PLT in the last 48 hours.    CMP:   Recent Labs   Lab 08/03/22  0544 08/04/22  0244   * 135*   K 3.8 3.0*   CL 97 92*   CO2 27 33*   * 108   BUN 20 11   CREATININE 0.8 0.7   CALCIUM 8.5* 8.2*   ANIONGAP 10 10       Cardiac Markers:   No results for input(s): CKMB, MYOGLOBIN, BNP, TROPISTAT in the last 48 hours.    Lactic Acid:   No results for input(s): LACTATE in the last 48 hours.      Significant Imaging: I have reviewed all pertinent imaging results/findings within the past 24 hours.

## 2022-08-04 NOTE — PROGRESS NOTES
[facility] - [facility] S[facility]  Cardiology  Progress Note    Patient Name: Odette Hart  MRN: [id number]  Admission Date: 8/1/2022  Hospital Length of Stay: 1 days  Code Status: Full Code   Attending Physician: Abida Caban MD   Primary Care Physician: Braxton Barragan MD  Expected Discharge Date: 8/8/2022  Principal Problem:Asymptomatic bacteriuria    Subjective:     Hospital Course:   Continuing to diurese.      Interval History: Net negative 3.5, hypokalemic    Review of Systems   Constitutional: Positive for malaise/fatigue and weight gain. Negative for decreased appetite.   HENT:  Negative for congestion and ear pain.    Cardiovascular:  Positive for leg swelling and orthopnea.   Respiratory:  Positive for shortness of breath. Negative for cough.    Skin:  Negative for color change and nail changes.   Musculoskeletal:  Negative for arthritis and back pain.   Gastrointestinal:  Negative for bloating and abdominal pain.   Genitourinary:  Negative for bladder incontinence and dysuria.   Neurological:  Negative for aphonia and brief paralysis.   Psychiatric/Behavioral:  Negative for altered mental status and depression.    Objective:     Vital Signs (Most Recent):  Temp: 97.7 °F (36.5 °C) (08/04/22 0416)  Pulse: 89 (08/04/22 0416)  Resp: 16 (08/04/22 0416)  BP: 109/68 (08/04/22 0416)  SpO2: (!) 94 % (08/04/22 0416)   Vital Signs (24h Range):  Temp:  [97.7 °F (36.5 °C)-98 °F (36.7 °C)] 97.7 °F (36.5 °C)  Pulse:  [18-89] 89  Resp:  [16-18] 16  SpO2:  [94 %-99 %] 94 %  BP: ()/(53-68) 109/68     Weight: 53.7 kg (118 lb 6.2 oz)  Body mass index is 20.97 kg/m².     SpO2: (!) 94 %  O2 Device (Oxygen Therapy): nasal cannula      Intake/Output Summary (Last 24 hours) at 8/4/2022 0741  Last data filed at 8/4/2022 0106  Gross per 24 hour   Intake --   Output 3500 ml   Net -3500 ml       Lines/Drains/Airways       Drain  Duration                  Colostomy 05/09/22 1100 Descending/sigmoid LLQ 86 days              Peripheral  Intravenous Line  Duration                  Midline Catheter Insertion/Assessment  - Single Lumen 07/20/22 1600 Left basilic vein (medial side of arm)  14 days         Peripheral IV - Single Lumen 08/02/22 1145 20 G;1 3/4 in Left Upper Arm 1 day                    Physical Exam  Vitals reviewed.   Constitutional:       General: She is not in acute distress.     Appearance: Normal appearance.   HENT:      Head: Normocephalic and atraumatic.      Right Ear: External ear normal.      Left Ear: External ear normal.      Nose: Nose normal.      Mouth/Throat:      Pharynx: Oropharynx is clear.   Eyes:      Conjunctiva/sclera: Conjunctivae normal.   Cardiovascular:      Rate and Rhythm: Normal rate.      Pulses: Normal pulses.   Pulmonary:      Effort: Pulmonary effort is normal.      Breath sounds: Rales present.   Abdominal:      General: Abdomen is flat. There is no distension.      Palpations: Abdomen is soft.   Musculoskeletal:      Right lower leg: Edema present.      Left lower leg: Edema present.   Neurological:      General: No focal deficit present.      Mental Status: She is oriented to person, place, and time.   Psychiatric:         Mood and Affect: Mood normal.         Behavior: Behavior normal.       Significant Labs: All pertinent lab results from the last 24 hours have been reviewed.    Significant Imaging: EKG: reviewed    Assessment and Plan:     Brief HPI:     [age] year female with a history ischemic cardiomyopathy status post PCI to the prox and mid left circ with 2.75 x 18 mm and 2.75 x 22 mm resolute marlee stent 01/24/2022, heart failure reduced EF-20% status post AICD, hypertension, hyperlipidemia, adiverticulitis s/p cystoscopy with BL ureteral catheters, open sigmoid colon resection, appendectomy, creation of end colostomy on 5/9/22 who presents to hospital secondary to worsening shortness of breath, generalized weakness, nausea/vomiting abdominal pain. Per chart review, she has been  readmitted multiple times since her sigmoidectomy, for wound care and SOB/HF exacerbation, most recently to [facility] from 7/18/22 - 7/23/22. There she was treated for a midline wound with wound care and 5 days of abx.  Patient's dyspnea has been worsening and she requested Cardiology consultation regarding heart failure.  She is lying in bed, dyspneic appearing on oxygen nasal cannula.  CTA chest x-ray showing pulmonary edema with pulmonary vascular congestion.  She has JVD past to tragus of her ear on exam with 1+ edema in lower extremities bilaterally.    Prolonged Q-T interval on ECG  EKG 8/1 Vent. Rate : 108 BPM     Atrial Rate : 108 BPM  P-R Int : 122 ms     QRS Dur : 120 ms   QT Int : 366 ms    P-R-T Axes : 081 -33 078 degrees QTc Int : 490 ms   Sinus tachycardia Left axis deviation Cannot exclude  Anterolateral infarct (cited on or before 16-JAN-2019) Abnormal ECG When compared with ECG of 18-JUL-2022 13:25,   QRS duration has decreased Questionable change in initial forces of Lateral leads   T wave inversion no longer evident in Anterior leads QT has shortened     -Avoid QT prolonging medicines as able    Acute on chronic systolic heart failure  Echo in May.The left ventricle is severely enlarged with eccentric hypertrophy and severely decreased systolic function.Severe left atrial enlargement.Grade III left ventricular diastolic dysfunction.Normal right ventricular size with normal right ventricular systolic function.Mild-to-moderate mitral regurgitation.Mild tricuspid regurgitation.There is mild pulmonary hypertension.    The patient is heavily diuresing, net negative 3.5 L    -Lasix 80 mg IV b.i.d. ---> Transition to 80 po BID  -Strict Is and Os, is not recorded  -Order Echocardio  -continue ASA, Plavix  -Initiate Statin  -metoprolol succinate 25 daily  -Resume her low dose Entresto  -maintain potassium greater than 4 and magnesium greater than 2-Replete  -Jardiance at discharge    History of myocardial  infarction  See CAD    Coronary artery disease of native artery of native heart with stable angina pectoris  See CAD stent placement        RBBB with left anterior fascicular block  See prolonged QT    History of coronary artery stent placement  The left ventricular end diastolic pressure was elevated.The pre-procedure left ventricular end diastolic pressure was 20.The Mid LAD lesion was 70% stenosed.The Prox Cx lesion was 80% stenosed with 0% stenosis post-intervention.The Mid Cx lesion was 90% stenosed with 0% stenosis post-intervention.The Prox RCA lesion was 50% stenosed.The RPDA lesion was 70% stenosed.The RPAV lesion was 60% stenosed.A STENT RESOLUTE AMBER [id number] stent was successfully placed at 16 PJ for 10 sec.A STENT RESOLUTE AMBER [id number] stent was successfully placed at 12 PJ for 15 sec.The estimated blood loss was <50 mL.There was three vessel coronary artery disease.    -Continue to monitor for chest pain   -Continue ASA  -Continue Clopidogrel      Automatic implantable cardioverter-defibrillator in situ  Noted hx    Ischemic cardiomyopathy  Left heart catheterization from January reviewed.  Her LAD was not intervened upon.  will follow patient closely.  If she develops chest pain or decompensates she may need to be evaluated by Interventional Cardiology for possible left heart catheterization and intervention of her LAD    -Continue to monitor for any new chest pain           VTE Risk Mitigation (From admission, onward)         Ordered     Place sequential compression device  Until discontinued         08/01/22 2642                Balbir Zhong MD  Cardiology  [facility]

## 2022-08-04 NOTE — ASSESSMENT & PLAN NOTE
Echo in May.The left ventricle is severely enlarged with eccentric hypertrophy and severely decreased systolic function.Severe left atrial enlargement.Grade III left ventricular diastolic dysfunction.Normal right ventricular size with normal right ventricular systolic function.Mild-to-moderate mitral regurgitation.Mild tricuspid regurgitation.There is mild pulmonary hypertension.    The patient is heavily diuresing, net negative 3.5 L    -Lasix 80 mg IV b.i.d. ---> Transition to 80 po BID  -Strict Is and Os, is not recorded  -Order Echocardio  -continue ASA, Plavix  -Initiate Statin  -metoprolol succinate 25 daily  -Resume her low dose Entresto  -maintain potassium greater than 4 and magnesium greater than 2-Replete  -Jardiance at discharge

## 2022-08-04 NOTE — ASSESSMENT & PLAN NOTE
The left ventricular end diastolic pressure was elevated.The pre-procedure left ventricular end diastolic pressure was 20.The Mid LAD lesion was 70% stenosed.The Prox Cx lesion was 80% stenosed with 0% stenosis post-intervention.The Mid Cx lesion was 90% stenosed with 0% stenosis post-intervention.The Prox RCA lesion was 50% stenosed.The RPDA lesion was 70% stenosed.The RPAV lesion was 60% stenosed.A STENT RESOLUTE AMBER [id number] stent was successfully placed at 16 PJ for 10 sec.A STENT RESOLUTE AMBER 2.11T23FM stent was successfully placed at 12 PJ for 15 sec.The estimated blood loss was <50 mL.There was three vessel coronary artery disease.    -Continue to monitor for chest pain   -Continue ASA  -Continue Clopidogrel

## 2022-08-05 PROBLEM — E78.1 HYPERTRIGLYCERIDEMIA: Status: ACTIVE | Noted: 2022-01-01

## 2022-08-05 NOTE — ASSESSMENT & PLAN NOTE
Echo in May.The left ventricle is severely enlarged with eccentric hypertrophy and severely decreased systolic function.Severe left atrial enlargement.Grade III left ventricular diastolic dysfunction.Normal right ventricular size with normal right ventricular systolic function.Mild-to-moderate mitral regurgitation.Mild tricuspid regurgitation.There is mild pulmonary hypertension.    Echo was repeated yesterday showing  · The left ventricle is moderately enlarged with eccentric hypertrophy and severely decreased systolic function.  · The estimated ejection fraction is 10%.  · Grade II left ventricular diastolic dysfunction.  · Moderate left atrial enlargement.  · Moderate mitral regurgitation.  · No obvious endocarditis  · Normal right ventricular size with low normal right ventricular systolic function.  · Mild to moderate tricuspid regurgitation.  · Mild aortic regurgitation.  · There are segmental left ventricular wall motion abnormalities. Akinetic apex. No thrombus.     The patient is heavily diuresing, net negative 1.8 L with 2 outputs unrecorded    Plan was to initiate Entresto at a low dose but BPs remain low    -Decrease Lasix  80 po BID to 40 PO BID  -Repeat a BNP to evaluate diurese  -Strict Is and Os  -continue ASA, Plavix  -Initiate Statin  -Transition metoprolol succinate 25 daily to Coreg 3.25 BID  -maintain potassium greater than 4 and magnesium greater than 2  -Jardiance at discharge

## 2022-08-05 NOTE — PLAN OF CARE
Pt continues with Lasix, wound care provided  Problem: Adult Inpatient Plan of Care  Goal: Plan of Care Review  Outcome: Ongoing, Progressing  Goal: Patient-Specific Goal (Individualized)  Outcome: Ongoing, Progressing  Goal: Absence of Hospital-Acquired Illness or Injury  Outcome: Ongoing, Progressing  Goal: Optimal Comfort and Wellbeing  Outcome: Ongoing, Progressing  Goal: Readiness for Transition of Care  Outcome: Ongoing, Progressing     Problem: Infection  Goal: Absence of Infection Signs and Symptoms  Outcome: Ongoing, Progressing     Problem: Impaired Wound Healing  Goal: Optimal Wound Healing  Outcome: Ongoing, Progressing     Problem: Skin Injury Risk Increased  Goal: Skin Health and Integrity  Outcome: Ongoing, Progressing

## 2022-08-05 NOTE — SUBJECTIVE & OBJECTIVE
Interval History: Continuing to diurese well after transition to oral lasix. Subjectively feeling much better. Continuing to have low blood pressures. Underwent Echo    Review of Systems   Constitutional: Positive for malaise/fatigue. Negative for decreased appetite and weight gain.   HENT:  Negative for congestion and ear pain.    Cardiovascular:  Positive for orthopnea. Negative for chest pain and leg swelling.   Respiratory:  Positive for shortness of breath. Negative for cough.    Skin:  Negative for color change and nail changes.   Musculoskeletal:  Negative for arthritis and back pain.   Gastrointestinal:  Negative for bloating and abdominal pain.   Genitourinary:  Negative for bladder incontinence and dysuria.   Neurological:  Negative for aphonia and brief paralysis.   Psychiatric/Behavioral:  Negative for altered mental status and depression.    Objective:     Vital Signs (Most Recent):  Temp: 98 °F (36.7 °C) (08/05/22 0735)  Pulse: 66 (08/05/22 0735)  Resp: 18 (08/05/22 0735)  BP: (!) 106/52 (08/05/22 0735)  SpO2: 95 % (08/05/22 0735)   Vital Signs (24h Range):  Temp:  [97.5 °F (36.4 °C)-98.5 °F (36.9 °C)] 98 °F (36.7 °C)  Pulse:  [66-82] 66  Resp:  [18-20] 18  SpO2:  [93 %-100 %] 95 %  BP: ()/(52-75) 106/52     Weight: 53.5 kg (118 lb)  Body mass index is 20.9 kg/m².     SpO2: 95 %  O2 Device (Oxygen Therapy): room air      Intake/Output Summary (Last 24 hours) at 8/5/2022 0810  Last data filed at 8/5/2022 0406  Gross per 24 hour   Intake 200 ml   Output 2000 ml   Net -1800 ml       Lines/Drains/Airways       Drain  Duration                  Colostomy 05/09/22 1100 Descending/sigmoid LLQ 87 days              Peripheral Intravenous Line  Duration                  Midline Catheter Insertion/Assessment  - Single Lumen 07/20/22 1600 Left basilic vein (medial side of arm)  15 days         Peripheral IV - Single Lumen 08/02/22 1145 20 G;1 3/4 in Left Upper Arm 2 days                    Physical  Exam  Vitals reviewed.   Constitutional:       General: She is not in acute distress.     Appearance: Normal appearance.   HENT:      Head: Normocephalic and atraumatic.      Right Ear: External ear normal.      Left Ear: External ear normal.      Nose: Nose normal.      Mouth/Throat:      Pharynx: Oropharynx is clear.   Eyes:      Conjunctiva/sclera: Conjunctivae normal.   Cardiovascular:      Rate and Rhythm: Normal rate.      Pulses: Normal pulses.   Pulmonary:      Effort: Pulmonary effort is normal.      Breath sounds: Rales present.      Comments: Rales greatly improved and only at the bases  Abdominal:      General: Abdomen is flat. There is no distension.      Palpations: Abdomen is soft.   Musculoskeletal:      Right lower leg: No edema.      Left lower leg: No edema.   Skin:     General: Skin is warm and dry.   Neurological:      General: No focal deficit present.      Mental Status: She is oriented to person, place, and time.   Psychiatric:         Mood and Affect: Mood normal.         Behavior: Behavior normal.       Significant Labs: All pertinent lab results from the last 24 hours have been reviewed.    Significant Imaging: EKG: reviewed

## 2022-08-05 NOTE — PROGRESS NOTES
[facility]  Cardiology  Progress Note    Patient Name: Odette Hart  MRN: [id number]  Admission Date: 8/1/2022  Hospital Length of Stay: 2 days  Code Status: Full Code   Attending Physician: Abida Caban MD   Primary Care Physician: Braxton Barragan MD  Expected Discharge Date: 8/8/2022  Principal Problem:Asymptomatic bacteriuria    Subjective:     Hospital Course:   Diuresed using IV lasix. Echo repeated which showed The left ventricle is moderately enlarged with eccentric hypertrophy and severely decreased systolic function.The estimated ejection fraction is 10%.  Grade II left ventricular diastolic dysfunction.Moderate left atrial enlargement.Moderate mitral regurgitation.  No obvious endocarditisNormal right ventricular size with low normal right ventricular systolic function.  Mild to moderate tricuspid regurgitation.Mild aortic regurgitation.There are segmental left ventricular wall motion abnormalities. Akinetic apex. No thrombus.         Interval History: Continuing to diurese well after transition to oral lasix. Subjectively feeling much better. Continuing to have low blood pressures. Underwent Echo    Review of Systems   Constitutional: Positive for malaise/fatigue. Negative for decreased appetite and weight gain.   HENT:  Negative for congestion and ear pain.    Cardiovascular:  Positive for orthopnea. Negative for chest pain and leg swelling.   Respiratory:  Positive for shortness of breath. Negative for cough.    Skin:  Negative for color change and nail changes.   Musculoskeletal:  Negative for arthritis and back pain.   Gastrointestinal:  Negative for bloating and abdominal pain.   Genitourinary:  Negative for bladder incontinence and dysuria.   Neurological:  Negative for aphonia and brief paralysis.   Psychiatric/Behavioral:  Negative for altered mental status and depression.    Objective:     Vital Signs (Most Recent):  Temp: 98 °F (36.7 °C) (08/05/22 0735)  Pulse: 66 (08/05/22  0735)  Resp: 18 (08/05/22 0735)  BP: (!) 106/52 (08/05/22 0735)  SpO2: 95 % (08/05/22 0735)   Vital Signs (24h Range):  Temp:  [97.5 °F (36.4 °C)-98.5 °F (36.9 °C)] 98 °F (36.7 °C)  Pulse:  [66-82] 66  Resp:  [18-20] 18  SpO2:  [93 %-100 %] 95 %  BP: ()/(52-75) 106/52     Weight: 53.5 kg (118 lb)  Body mass index is 20.9 kg/m².     SpO2: 95 %  O2 Device (Oxygen Therapy): room air      Intake/Output Summary (Last 24 hours) at 8/5/2022 0810  Last data filed at 8/5/2022 0406  Gross per 24 hour   Intake 200 ml   Output 2000 ml   Net -1800 ml       Lines/Drains/Airways       Drain  Duration                  Colostomy 05/09/22 1100 Descending/sigmoid LLQ 87 days              Peripheral Intravenous Line  Duration                  Midline Catheter Insertion/Assessment  - Single Lumen 07/20/22 1600 Left basilic vein (medial side of arm)  15 days         Peripheral IV - Single Lumen 08/02/22 1145 20 G;1 3/4 in Left Upper Arm 2 days                    Physical Exam  Vitals reviewed.   Constitutional:       General: She is not in acute distress.     Appearance: Normal appearance.   HENT:      Head: Normocephalic and atraumatic.      Right Ear: External ear normal.      Left Ear: External ear normal.      Nose: Nose normal.      Mouth/Throat:      Pharynx: Oropharynx is clear.   Eyes:      Conjunctiva/sclera: Conjunctivae normal.   Cardiovascular:      Rate and Rhythm: Normal rate.      Pulses: Normal pulses.   Pulmonary:      Effort: Pulmonary effort is normal.      Breath sounds: Rales present.      Comments: Rales greatly improved and only at the bases  Abdominal:      General: Abdomen is flat. There is no distension.      Palpations: Abdomen is soft.   Musculoskeletal:      Right lower leg: No edema.      Left lower leg: No edema.   Skin:     General: Skin is warm and dry.   Neurological:      General: No focal deficit present.      Mental Status: She is oriented to person, place, and time.   Psychiatric:         Mood  and Affect: Mood normal.         Behavior: Behavior normal.       Significant Labs: All pertinent lab results from the last 24 hours have been reviewed.    Significant Imaging: EKG: reviewed    Assessment and Plan:     Brief HPI:   [age] year female with a history ischemic cardiomyopathy status post PCI to the prox and mid left circ with 2.75 x 18 mm and 2.75 x 22 mm resolute marlee stent 01/24/2022, heart failure reduced EF-20% status post AICD, hypertension, hyperlipidemia, adiverticulitis s/p cystoscopy with BL ureteral catheters, open sigmoid colon resection, appendectomy, creation of end colostomy on 5/9/22 who presents to hospital secondary to worsening shortness of breath, generalized weakness, nausea/vomiting abdominal pain. Per chart review, she has been readmitted multiple times since her sigmoidectomy, for wound care and SOB/HF exacerbation, most recently to [facility] from 7/18/22 - 7/23/22. There she was treated for a midline wound with wound care and 5 days of abx.  Patient's dyspnea has been worsening and she requested Cardiology consultation regarding heart failure.  She is lying in bed, dyspneic appearing on oxygen nasal cannula.  CTA chest x-ray showing pulmonary edema with pulmonary vascular congestion.  She has JVD past to tragus of her ear on exam with 1+ edema in lower extremities bilaterally.    Hypertriglyceridemia  On Zetia 10 nightly     Prolonged Q-T interval on ECG  EKG 8/1 Vent. Rate : 108 BPM     Atrial Rate : 108 BPM  P-R Int : 122 ms     QRS Dur : 120 ms   QT Int : 366 ms    P-R-T Axes : 081 -33 078 degrees QTc Int : 490 ms   Sinus tachycardia Left axis deviation Cannot exclude  Anterolateral infarct (cited on or before 16-JAN-2019) Abnormal ECG When compared with ECG of 18-JUL-2022 13:25,   QRS duration has decreased Questionable change in initial forces of Lateral leads   T wave inversion no longer evident in Anterior leads QT has shortened     -Avoid QT prolonging medicines as  able    Acute on chronic systolic heart failure  Echo in May.The left ventricle is severely enlarged with eccentric hypertrophy and severely decreased systolic function.Severe left atrial enlargement.Grade III left ventricular diastolic dysfunction.Normal right ventricular size with normal right ventricular systolic function.Mild-to-moderate mitral regurgitation.Mild tricuspid regurgitation.There is mild pulmonary hypertension.    Echo was repeated yesterday showing  · The left ventricle is moderately enlarged with eccentric hypertrophy and severely decreased systolic function.  · The estimated ejection fraction is 10%.  · Grade II left ventricular diastolic dysfunction.  · Moderate left atrial enlargement.  · Moderate mitral regurgitation.  · No obvious endocarditis  · Normal right ventricular size with low normal right ventricular systolic function.  · Mild to moderate tricuspid regurgitation.  · Mild aortic regurgitation.  · There are segmental left ventricular wall motion abnormalities. Akinetic apex. No thrombus.     The patient is heavily diuresing, net negative 1.8 L with 2 outputs unrecorded    Plan was to initiate Entresto at a low dose but BPs remain low    -Decrease Lasix  80 po BID to 40 PO BID  -Repeat a BNP to evaluate diurese  -Strict Is and Os  -continue ASA, Plavix  -Initiate Statin  -Transition metoprolol succinate 25 daily to Coreg 3.25 BID  -maintain potassium greater than 4 and magnesium greater than 2  -Jardiance at discharge    History of myocardial infarction  See CAD    Coronary artery disease of native artery of native heart with stable angina pectoris  See CAD stent placement        RBBB with left anterior fascicular block  See prolonged QT    History of coronary artery stent placement  The left ventricular end diastolic pressure was elevated.The pre-procedure left ventricular end diastolic pressure was 20.The Mid LAD lesion was 70% stenosed.The Prox Cx lesion was 80% stenosed with 0%  stenosis post-intervention.The Mid Cx lesion was 90% stenosed with 0% stenosis post-intervention.The Prox RCA lesion was 50% stenosed.The RPDA lesion was 70% stenosed.The RPAV lesion was 60% stenosed.A STENT RESOLUTE AMBER [id number] stent was successfully placed at 16 PJ for 10 sec.A STENT RESOLUTE AMBER 2.70T07PL stent was successfully placed at 12 PJ for 15 sec.The estimated blood loss was <50 mL.There was three vessel coronary artery disease.    -Continue to monitor for chest pain   -Continue ASA  -Continue Clopidogrel      Automatic implantable cardioverter-defibrillator in situ  Noted hx    Ischemic cardiomyopathy  Left heart catheterization from January reviewed.  Her LAD was not intervened upon.  will follow patient closely.  If she develops chest pain or decompensates she may need to be evaluated by Interventional Cardiology for possible left heart catheterization and intervention of her LAD    -Continue to monitor for any new chest pain           VTE Risk Mitigation (From admission, onward)         Ordered     Place sequential compression device  Until discontinued         08/01/22 0386                Balbir Zhong MD  Cardiology  [facility]

## 2022-08-05 NOTE — PROGRESS NOTES
[facility] Medicine  Progress Note    Patient Name: Odette Hart  MRN: [id number]  Patient Class: IP- Inpatient   Admission Date: 8/1/2022  Length of Stay: 2 days  Attending Physician: Abida Caban MD  Primary Care Provider: Braxton Barragan MD      Subjective:     Principal Problem:Asymptomatic bacteriuria      HPI:  Ms. Pino is a 66-year-old female with a past medical history that includes adiverticulitis s/p cystoscopy with BL ureteral catheters, open sigmoid colon resection, appendectomy, creation of end colostomy on 5/9/22, heart failure and AICD placement.  Patient presents to the emergency department due to abdominal pain which she states has been going on for the past 2 days after she had some hot sausage. She describes the abdominal pain as aching all over her abdomen she also states that she has been having increased output into her colostomy bag. Additionally patient states that the wound next to her colostomy bag has been draining foul-smelling fluid.  Patient also states that a week ago she had noticed some rectal bleeding but it has since resolved.  She does states that today she had a bowel movement from her rectum which is not something that usually happens.   Patient is also endorsing increasing shortness of breath and lower extremity swelling. She took double her Lasix dose yesterday but her SOB is persistent. She is seen by wound care as an outpatient 4x/week.     Per chart review, she has been readmitted multiple times since her sigmoidectomy, for wound care and SOB/HF exacerbation, most recently to [facility] from 7/18/22 - 7/23/22. There she was treated for a midline wound with wound care and 5 days of abx, as well as for CHF with diuretics.      In the ED, Lab workup was significant for UTI as well as an elevated lactate. She was given antibiotics. She was evaluated by CRS who do not feel surgical intervention is warranted at this time.       Interval History: RASHMI.  Still feels chest pressure/ SOB with deep breathing.       Review of Systems   Constitutional:  Negative for chills and fever.   Respiratory:  Positive for chest tightness (pressure) and shortness of breath.    Cardiovascular:  Negative for chest pain.   Gastrointestinal:  Positive for nausea (intermittent). Negative for abdominal pain.   Genitourinary:  Negative for dysuria.   Neurological:  Negative for headaches.   Psychiatric/Behavioral:  Negative for confusion.      Objective:     Vital Signs (Most Recent):  Temp: 98 °F (36.7 °C) (08/05/22 1134)  Pulse: 75 (08/05/22 1134)  Resp: 19 (08/05/22 1134)  BP: (!) 93/56 (08/05/22 1134)  SpO2: 98 % (08/05/22 1134) Vital Signs (24h Range):  Temp:  [97.8 °F (36.6 °C)-98.4 °F (36.9 °C)] 98 °F (36.7 °C)  Pulse:  [66-77] 75  Resp:  [18-20] 19  SpO2:  [94 %-99 %] 98 %  BP: ()/(52-75) 93/56     Weight: 53.5 kg (118 lb)  Body mass index is 20.9 kg/m².    Intake/Output Summary (Last 24 hours) at 8/5/2022 1405  Last data filed at 8/5/2022 1000  Gross per 24 hour   Intake 560 ml   Output 2000 ml   Net -1440 ml        Physical Exam  Vitals and nursing note reviewed.   Constitutional:       General: She is not in acute distress.     Appearance: She is well-developed.      Interventions: Nasal cannula in place.   HENT:      Head: Normocephalic and atraumatic.   Eyes:      Conjunctiva/sclera: Conjunctivae normal.   Neck:      Vascular: No JVD.   Cardiovascular:      Rate and Rhythm: Normal rate and regular rhythm.      Heart sounds: Normal heart sounds.   Pulmonary:      Effort: Pulmonary effort is normal.      Breath sounds: Normal breath sounds.   Abdominal:      General: Bowel sounds are normal. There is no distension.      Palpations: Abdomen is soft.      Tenderness: There is no abdominal tenderness.   Musculoskeletal:      Cervical back: Neck supple.      Right lower leg: No edema.      Left lower leg: No edema.   Neurological:      Mental Status: She is alert.    Psychiatric:         Mood and Affect: Mood is depressed. Affect is tearful.         Behavior: Behavior normal.      Comments: Feels she might not live much longer given her worsening EF       Significant Labs: All pertinent labs within the past 24 hours have been reviewed.  CBC:   No results for input(s): WBC, HGB, HCT, PLT in the last 48 hours.    CMP:   Recent Labs   Lab 08/04/22  0244 08/05/22  0536   * 131*   K 3.0* 3.5   CL 92* 87*   CO2 33* 34*    126*   BUN 11 11   CREATININE 0.7 0.8   CALCIUM 8.2* 8.7   ANIONGAP 10 10       Cardiac Markers:   No results for input(s): CKMB, MYOGLOBIN, BNP, TROPISTAT in the last 48 hours.    Lactic Acid:   No results for input(s): LACTATE in the last 48 hours.      Significant Imaging: I have reviewed all pertinent imaging results/findings within the past 24 hours.      Assessment/Plan:      Acute on chronic systolic and diastolic congestive heart failure  BNP elevated. Cards following. Improved on high dose IV Lasix. Replace K++.   Echo: The estimated ejection fraction is 10%.  Grade II left ventricular diastolic dysfunction.  · There are segmental left ventricular wall motion abnormalities. Akinetic apex. No thrombus.     Recs: -Decrease Lasix  80 po BID to 40 PO BID   -Strict Is and Os  -continue ASA, Plavix  -Initiate Statin  -Repeat a BNP to evaluate diurese   Coreg 3.25 BID   -maintain potassium greater than 4 and magnesium greater than 2  -Jardiance at discharge        COPD (chronic obstructive pulmonary disease)  Dyspnea  D dimer very elevated. CTA neg for PE, but fluid overload seen.   - continue routine inhalers, diuresis         Alteration in skin integrity in adult  takedown of colovesical and colovaginal fistuli, and partial cecectomy with appendectomy (5/9/22)  CRS recs:   - Midline wound stable on physical exam and on imaging without drainable fluid collection. No indication for surgical intervention at this time. Continue wound care  - Ostomy  care  Per [facility] records d/c 7/23: Patient was treated for 5 days with IV antibiotics. Abdominal wall fluid collection spontaneously resolved. Symptoms thought to be from abdominal wall infection.     - will need to continue outpt wound care upon dc.      Asymptomatic bacteruria  - UA not suggestive of UTI. Would not give further abx. F/u Ucx. Has had 3 negative ucx in last 5 months.      Severe protein-calorie malnutrition   maximize oral intake     CAD (coronary artery disease)  Continue aspirin/ plavix/ BB  Possible IP angiogram if continued chest pressure      VTE Risk Mitigation (From admission, onward)         Ordered     Place sequential compression device  Until discontinued         08/01/22 1297                Discharge Planning   SIVAN: 8/8/2022     Code Status: Full Code   Is the patient medically ready for discharge?: No    Reason for patient still in hospital (select all that apply): Patient trending condition, Treatment and Consult recommendations  Discharge Plan A: Home Health          Abida Caban MD  Department of Hospital Medicine   [facility]

## 2022-08-05 NOTE — ASSESSMENT & PLAN NOTE
The left ventricular end diastolic pressure was elevated.The pre-procedure left ventricular end diastolic pressure was 20.The Mid LAD lesion was 70% stenosed.The Prox Cx lesion was 80% stenosed with 0% stenosis post-intervention.The Mid Cx lesion was 90% stenosed with 0% stenosis post-intervention.The Prox RCA lesion was 50% stenosed.The RPDA lesion was 70% stenosed.The RPAV lesion was 60% stenosed.A STENT RESOLUTE AMBER [id number] stent was successfully placed at 16 PJ for 10 sec.A STENT RESOLUTE AMBER [id number] stent was successfully placed at 12 PJ for 15 sec.The estimated blood loss was <50 mL.There was three vessel coronary artery disease.    -Continue to monitor for chest pain   -Continue ASA  -Continue Clopidogrel

## 2022-08-05 NOTE — NURSING
0725 Pt in bed A&O x4 resp even and unlabored, no s/s distress/ discomfort, communication board noted updated by PCT, bed in low locked position, call light in reach    0910 Pt sitting up on side of bed eating breakfast,  resp even and unlabored, no s/s distress/ discomfort, reports concerns not having enough BM and when had it was hard little balls, took stool softener will take miralax later today

## 2022-08-05 NOTE — SUBJECTIVE & OBJECTIVE
Interval History: NAEON. Still feels chest pressure/ SOB with deep breathing.       Review of Systems   Constitutional:  Negative for chills and fever.   Respiratory:  Positive for chest tightness (pressure) and shortness of breath.    Cardiovascular:  Negative for chest pain.   Gastrointestinal:  Positive for nausea (intermittent). Negative for abdominal pain.   Genitourinary:  Negative for dysuria.   Neurological:  Negative for headaches.   Psychiatric/Behavioral:  Negative for confusion.      Objective:     Vital Signs (Most Recent):  Temp: 98 °F (36.7 °C) (08/05/22 1134)  Pulse: 75 (08/05/22 1134)  Resp: 19 (08/05/22 1134)  BP: (!) 93/56 (08/05/22 1134)  SpO2: 98 % (08/05/22 1134) Vital Signs (24h Range):  Temp:  [97.8 °F (36.6 °C)-98.4 °F (36.9 °C)] 98 °F (36.7 °C)  Pulse:  [66-77] 75  Resp:  [18-20] 19  SpO2:  [94 %-99 %] 98 %  BP: ()/(52-75) 93/56     Weight: 53.5 kg (118 lb)  Body mass index is 20.9 kg/m².    Intake/Output Summary (Last 24 hours) at 8/5/2022 1405  Last data filed at 8/5/2022 1000  Gross per 24 hour   Intake 560 ml   Output 2000 ml   Net -1440 ml        Physical Exam  Vitals and nursing note reviewed.   Constitutional:       General: She is not in acute distress.     Appearance: She is well-developed.      Interventions: Nasal cannula in place.   HENT:      Head: Normocephalic and atraumatic.   Eyes:      Conjunctiva/sclera: Conjunctivae normal.   Neck:      Vascular: No JVD.   Cardiovascular:      Rate and Rhythm: Normal rate and regular rhythm.      Heart sounds: Normal heart sounds.   Pulmonary:      Effort: Pulmonary effort is normal.      Breath sounds: Normal breath sounds.   Abdominal:      General: Bowel sounds are normal. There is no distension.      Palpations: Abdomen is soft.      Tenderness: There is no abdominal tenderness.   Musculoskeletal:      Cervical back: Neck supple.      Right lower leg: No edema.      Left lower leg: No edema.   Neurological:      Mental Status:  She is alert.   Psychiatric:         Mood and Affect: Mood is depressed. Affect is tearful.         Behavior: Behavior normal.      Comments: Feels she might not live much longer given her worsening EF       Significant Labs: All pertinent labs within the past 24 hours have been reviewed.  CBC:   No results for input(s): WBC, HGB, HCT, PLT in the last 48 hours.    CMP:   Recent Labs   Lab 08/04/22  0244 08/05/22  0536   * 131*   K 3.0* 3.5   CL 92* 87*   CO2 33* 34*    126*   BUN 11 11   CREATININE 0.7 0.8   CALCIUM 8.2* 8.7   ANIONGAP 10 10       Cardiac Markers:   No results for input(s): CKMB, MYOGLOBIN, BNP, TROPISTAT in the last 48 hours.    Lactic Acid:   No results for input(s): LACTATE in the last 48 hours.      Significant Imaging: I have reviewed all pertinent imaging results/findings within the past 24 hours.

## 2022-08-06 NOTE — NURSING
"0749 Pt in bed lying at the foot of the bed arouses to verbal stimuli, vitals done, communication board updated, pt states had a rough night not feeling well and nauseated, noted only drank half magnesium citrate , no BM yet even with miralax and colace    0933 Pt lying in bed back lying at HOB, states just hasn't felt like eating much food, refused to take K+ dissolving tabs says "just too much right now needs to have BM" took miralax, a sprite provided to try to get the rest of mag citrate drank will try to continue to encourage    1040 Still 1/4 mag citrate in bottle only drank part of other 1/4 in sprite and on Ice resting with eyes closed    1136 Pt never finished Mag citrate but had afull bag of soft watery BM in colostomy, bag changed at this time, c/o some nausea gave zofran, also gave the K+ ordered for earlier, pt sitting up in bed about to eat lunch starting to look better and says feels some better now  "

## 2022-08-06 NOTE — PROGRESS NOTES
[facility] Medicine  Progress Note    Patient Name: Odette Hart  MRN: [id number]  Patient Class: IP- Inpatient   Admission Date: 8/1/2022  Length of Stay: 3 days  Attending Physician: Abida Caban MD  Primary Care Provider: Braxton Barragan MD      Subjective:     Principal Problem:Asymptomatic bacteriuria      HPI:  Ms. Pino is a 66-year-old female with a past medical history that includes adiverticulitis s/p cystoscopy with BL ureteral catheters, open sigmoid colon resection, appendectomy, creation of end colostomy on 5/9/22, heart failure and AICD placement.  Patient presents to the emergency department due to abdominal pain which she states has been going on for the past 2 days after she had some hot sausage. She describes the abdominal pain as aching all over her abdomen she also states that she has been having increased output into her colostomy bag. Additionally patient states that the wound next to her colostomy bag has been draining foul-smelling fluid.  Patient also states that a week ago she had noticed some rectal bleeding but it has since resolved.  She does states that today she had a bowel movement from her rectum which is not something that usually happens.   Patient is also endorsing increasing shortness of breath and lower extremity swelling. She took double her Lasix dose yesterday but her SOB is persistent. She is seen by wound care as an outpatient 4x/week.     Per chart review, she has been readmitted multiple times since her sigmoidectomy, for wound care and SOB/HF exacerbation, most recently to [facility] from 7/18/22 - 7/23/22. There she was treated for a midline wound with wound care and 5 days of abx, as well as for CHF with diuretics.      In the ED, Lab workup was significant for UTI as well as an elevated lactate. She was given antibiotics. She was evaluated by CRS who do not feel surgical intervention is warranted at this time.       Interval History: RASHMI.  She does not feel that oral lasix works as well as the IV.      Review of Systems   Constitutional:  Negative for chills and fever.   Respiratory:  Negative for shortness of breath.    Cardiovascular:  Negative for chest pain.   Gastrointestinal:  Positive for nausea (intermittent). Negative for abdominal pain.   Genitourinary:  Negative for dysuria.   Neurological:  Negative for headaches.   Psychiatric/Behavioral:  Negative for confusion.      Objective:     Vital Signs (Most Recent):  Temp: 97.9 °F (36.6 °C) (08/06/22 1615)  Pulse: 71 (08/06/22 1615)  Resp: 17 (08/06/22 1553)  BP: (!) 96/55 (08/06/22 1133)  SpO2: 97 % (08/06/22 1615) Vital Signs (24h Range):  Temp:  [96.7 °F (35.9 °C)-99.1 °F (37.3 °C)] 97.9 °F (36.6 °C)  Pulse:  [67-81] 71  Resp:  [16-18] 17  SpO2:  [92 %-98 %] 97 %  BP: ()/(55-59) 96/55     Weight: 53.5 kg (118 lb)  Body mass index is 20.9 kg/m².    Intake/Output Summary (Last 24 hours) at 8/6/2022 1632  Last data filed at 8/6/2022 1200  Gross per 24 hour   Intake 600 ml   Output --   Net 600 ml        Physical Exam  Vitals and nursing note reviewed.   Constitutional:       General: She is not in acute distress.     Appearance: She is well-developed.      Interventions: Nasal cannula in place.   HENT:      Head: Normocephalic and atraumatic.   Eyes:      Conjunctiva/sclera: Conjunctivae normal.   Neck:      Vascular: No JVD.   Cardiovascular:      Rate and Rhythm: Normal rate and regular rhythm.      Heart sounds: Normal heart sounds.   Pulmonary:      Effort: Pulmonary effort is normal.      Breath sounds: Normal breath sounds.   Abdominal:      General: Bowel sounds are normal. There is no distension.      Palpations: Abdomen is soft.      Tenderness: There is no abdominal tenderness.   Musculoskeletal:      Cervical back: Neck supple.      Right lower leg: No edema.      Left lower leg: No edema.   Neurological:      Mental Status: She is alert.   Psychiatric:         Mood and Affect:  Mood is depressed. Affect is tearful.         Behavior: Behavior normal.      Comments: Feels she might not live much longer given her worsening EF       Significant Labs: All pertinent labs within the past 24 hours have been reviewed.  CBC:   No results for input(s): WBC, HGB, HCT, PLT in the last 48 hours.    CMP:   Recent Labs   Lab 08/05/22  0536 08/06/22  0307   * 131*   K 3.5 3.6   CL 87* 91*   CO2 34* 31*   * 134*   BUN 11 12   CREATININE 0.8 0.8   CALCIUM 8.7 8.9   ANIONGAP 10 9       Cardiac Markers:   Recent Labs   Lab 08/06/22  0307   BNP 2,699*       Lactic Acid:   No results for input(s): LACTATE in the last 48 hours.      Significant Imaging: I have reviewed all pertinent imaging results/findings within the past 24 hours.      Assessment/Plan:      Acute on chronic systolic and diastolic congestive heart failure  BNP elevated. Cards following. Improved on high dose IV Lasix. Replace K++.   Echo: The estimated ejection fraction is 10%.  Grade II left ventricular diastolic dysfunction.  · There are segmental left ventricular wall motion abnormalities. Akinetic apex. No thrombus.     Cards recs: - Lasix 40 PO BID   -Strict Is and Os  -continue ASA, Plavix  Coreg 3.25 BID   -maintain potassium greater than 4 and magnesium greater than 2  -Jardiance at discharge        COPD (chronic obstructive pulmonary disease)  Dyspnea  D dimer very elevated. CTA neg for PE, but fluid overload seen.   - continue routine inhalers, diuresis         Alteration in skin integrity in adult  takedown of colovesical and colovaginal fistuli, and partial cecectomy with appendectomy (5/9/22)  CRS recs:   - Midline wound stable on physical exam and on imaging without drainable fluid collection. No indication for surgical intervention at this time. Continue wound care  - Ostomy care  Per [facility] records d/c 7/23: Patient was treated for 5 days with IV antibiotics. Abdominal wall fluid collection spontaneously resolved.  Symptoms thought to be from abdominal wall infection.     - will need to continue outpt wound care upon dc.      Asymptomatic bacteruria  - UA not suggestive of UTI. Would not give further abx. F/u Ucx. Has had 3 negative ucx in last 5 months.      Severe protein-calorie malnutrition   maximize oral intake     CAD (coronary artery disease)  Continue aspirin/ plavix/ BB  Possible IP angiogram if continued chest pressure      VTE Risk Mitigation (From admission, onward)         Ordered     Place sequential compression device  Until discontinued         08/01/22 4570                Discharge Planning   SIVAN: 8/8/2022     Code Status: Full Code   Is the patient medically ready for discharge?: No    Reason for patient still in hospital (select all that apply): Patient trending condition, Treatment and PT / OT recommendations  Discharge Plan A: Home Health          Abida Caban MD  Department of Hospital Medicine   [facility] Surg

## 2022-08-06 NOTE — PLAN OF CARE
Cardiology - Cholesterol therapy    Patient with a history of CAD s/p PCI in 01/2022, not on a statin. She is taking ezitimibe as cholesterol monotherapy. Due to her cardiac history, patient should be on more intensive cholesterol-lowering medication regimen. Patient states that she has taken statins in the past and experienced myopathy and malaise while on them. She is not amenable to statin therapy.     -Patient needs an additional cholesterol-lowering agent. Recommend adding a PCSK9 inhibitor on discharge.    Joseluis Powers M.D.  Internal Medicine PGY-1, Cardiology

## 2022-08-06 NOTE — SUBJECTIVE & OBJECTIVE
Interval History: NAEON.        Review of Systems   Constitutional:  Negative for chills and fever.   Respiratory:  Negative for shortness of breath.    Cardiovascular:  Negative for chest pain.   Gastrointestinal:  Positive for nausea (intermittent). Negative for abdominal pain.   Genitourinary:  Negative for dysuria.   Neurological:  Negative for headaches.   Psychiatric/Behavioral:  Negative for confusion.      Objective:     Vital Signs (Most Recent):  Temp: 97.9 °F (36.6 °C) (08/06/22 1615)  Pulse: 71 (08/06/22 1615)  Resp: 17 (08/06/22 1553)  BP: (!) 96/55 (08/06/22 1133)  SpO2: 97 % (08/06/22 1615) Vital Signs (24h Range):  Temp:  [96.7 °F (35.9 °C)-99.1 °F (37.3 °C)] 97.9 °F (36.6 °C)  Pulse:  [67-81] 71  Resp:  [16-18] 17  SpO2:  [92 %-98 %] 97 %  BP: ()/(55-59) 96/55     Weight: 53.5 kg (118 lb)  Body mass index is 20.9 kg/m².    Intake/Output Summary (Last 24 hours) at 8/6/2022 1632  Last data filed at 8/6/2022 1200  Gross per 24 hour   Intake 600 ml   Output --   Net 600 ml        Physical Exam  Vitals and nursing note reviewed.   Constitutional:       General: She is not in acute distress.     Appearance: She is well-developed.      Interventions: Nasal cannula in place.   HENT:      Head: Normocephalic and atraumatic.   Eyes:      Conjunctiva/sclera: Conjunctivae normal.   Neck:      Vascular: No JVD.   Cardiovascular:      Rate and Rhythm: Normal rate and regular rhythm.      Heart sounds: Normal heart sounds.   Pulmonary:      Effort: Pulmonary effort is normal.      Breath sounds: Normal breath sounds.   Abdominal:      General: Bowel sounds are normal. There is no distension.      Palpations: Abdomen is soft.      Tenderness: There is no abdominal tenderness.   Musculoskeletal:      Cervical back: Neck supple.      Right lower leg: No edema.      Left lower leg: No edema.   Neurological:      Mental Status: She is alert.   Psychiatric:         Mood and Affect: Mood is depressed. Affect is  tearful.         Behavior: Behavior normal.      Comments: Feels she might not live much longer given her worsening EF       Significant Labs: All pertinent labs within the past 24 hours have been reviewed.  CBC:   No results for input(s): WBC, HGB, HCT, PLT in the last 48 hours.    CMP:   Recent Labs   Lab 08/05/22  0536 08/06/22  0307   * 131*   K 3.5 3.6   CL 87* 91*   CO2 34* 31*   * 134*   BUN 11 12   CREATININE 0.8 0.8   CALCIUM 8.7 8.9   ANIONGAP 10 9       Cardiac Markers:   Recent Labs   Lab 08/06/22  0307   BNP 2,699*       Lactic Acid:   No results for input(s): LACTATE in the last 48 hours.      Significant Imaging: I have reviewed all pertinent imaging results/findings within the past 24 hours.

## 2022-08-06 NOTE — PLAN OF CARE
Pt had large Bm and now having good output to colostomy bag, pt beginning to feel better will start therapy soon as orders placed today, gets up to BSC to void, continues Po lasix  Problem: Adult Inpatient Plan of Care  Goal: Plan of Care Review  Outcome: Ongoing, Progressing  Goal: Patient-Specific Goal (Individualized)  Outcome: Ongoing, Progressing  Goal: Absence of Hospital-Acquired Illness or Injury  Outcome: Ongoing, Progressing  Goal: Optimal Comfort and Wellbeing  Outcome: Ongoing, Progressing  Goal: Readiness for Transition of Care  Outcome: Ongoing, Progressing     Problem: Infection  Goal: Absence of Infection Signs and Symptoms  Outcome: Ongoing, Progressing     Problem: Impaired Wound Healing  Goal: Optimal Wound Healing  Outcome: Ongoing, Progressing     Problem: Skin Injury Risk Increased  Goal: Skin Health and Integrity  Outcome: Ongoing, Progressing

## 2022-08-07 NOTE — PROGRESS NOTES
[facility] Medicine  Progress Note    Patient Name: Odette Hart  MRN: [id number]  Patient Class: IP- Inpatient   Admission Date: 8/1/2022  Length of Stay: 4 days  Attending Physician: Abida Caban MD  Primary Care Provider: Braxton Barragan MD      Subjective:     Principal Problem:Asymptomatic bacteriuria      HPI:  Ms. Pino is a 66-year-old female with a past medical history that includes adiverticulitis s/p cystoscopy with BL ureteral catheters, open sigmoid colon resection, appendectomy, creation of end colostomy on 5/9/22, heart failure and AICD placement.  Patient presents to the emergency department due to abdominal pain which she states has been going on for the past 2 days after she had some hot sausage. She describes the abdominal pain as aching all over her abdomen she also states that she has been having increased output into her colostomy bag. Additionally patient states that the wound next to her colostomy bag has been draining foul-smelling fluid.  Patient also states that a week ago she had noticed some rectal bleeding but it has since resolved.  She does states that today she had a bowel movement from her rectum which is not something that usually happens.   Patient is also endorsing increasing shortness of breath and lower extremity swelling. She took double her Lasix dose yesterday but her SOB is persistent. She is seen by wound care as an outpatient 4x/week.     Per chart review, she has been readmitted multiple times since her sigmoidectomy, for wound care and SOB/HF exacerbation, most recently to [facility] from 7/18/22 - 7/23/22. There she was treated for a midline wound with wound care and 5 days of abx, as well as for CHF with diuretics.      In the ED, Lab workup was significant for UTI as well as an elevated lactate. She was given antibiotics. She was evaluated by CRS who do not feel surgical intervention is warranted at this time.       Interval History: RASHMI.         Review of Systems   Constitutional:  Negative for chills and fever.   Respiratory:  Negative for shortness of breath.    Cardiovascular:  Negative for chest pain.   Gastrointestinal:  Negative for abdominal pain.   Genitourinary:  Negative for dysuria.   Neurological:  Negative for headaches.   Psychiatric/Behavioral:  Negative for confusion.      Objective:     Vital Signs (Most Recent):  Temp: 96.1 °F (35.6 °C) (08/07/22 1115)  Pulse: 75 (08/07/22 1115)  Resp: 18 (08/07/22 1115)  BP: (!) 104/52 (08/07/22 1115)  SpO2: 98 % (08/07/22 1115) Vital Signs (24h Range):  Temp:  [96.1 °F (35.6 °C)-98.2 °F (36.8 °C)] 96.1 °F (35.6 °C)  Pulse:  [71-76] 75  Resp:  [16-18] 18  SpO2:  [95 %-98 %] 98 %  BP: ()/(40-57) 104/52     Weight: 53.5 kg (118 lb)  Body mass index is 20.9 kg/m².    Intake/Output Summary (Last 24 hours) at 8/7/2022 1341  Last data filed at 8/7/2022 1200  Gross per 24 hour   Intake 240 ml   Output 900 ml   Net -660 ml        Physical Exam  Vitals and nursing note reviewed.   Constitutional:       General: She is not in acute distress.     Appearance: She is well-developed.      Interventions: Nasal cannula in place.   HENT:      Head: Normocephalic and atraumatic.   Eyes:      Conjunctiva/sclera: Conjunctivae normal.   Neck:      Vascular: No JVD.   Cardiovascular:      Rate and Rhythm: Normal rate and regular rhythm.      Heart sounds: Normal heart sounds.   Pulmonary:      Effort: Pulmonary effort is normal.      Breath sounds: Normal breath sounds.   Abdominal:      General: Bowel sounds are normal. There is no distension.      Palpations: Abdomen is soft.      Tenderness: There is no abdominal tenderness.   Musculoskeletal:      Cervical back: Neck supple.      Right lower leg: No edema.      Left lower leg: No edema.   Neurological:      Mental Status: She is alert.   Psychiatric:         Mood and Affect: Mood normal.         Behavior: Behavior normal.       Significant Labs: All pertinent  labs within the past 24 hours have been reviewed.  CBC:   No results for input(s): WBC, HGB, HCT, PLT in the last 48 hours.    CMP:   Recent Labs   Lab 08/06/22  0307 08/07/22  0549   * 128*   K 3.6 4.0   CL 91* 86*   CO2 31* 35*   * 92   BUN 12 13   CREATININE 0.8 0.8   CALCIUM 8.9 8.7   ANIONGAP 9 7*       Cardiac Markers:   Recent Labs   Lab 08/06/22  0307   BNP 2,699*       Lactic Acid:   No results for input(s): LACTATE in the last 48 hours.      Significant Imaging: I have reviewed all pertinent imaging results/findings within the past 24 hours.      Assessment/Plan:      Acute on chronic systolic and diastolic congestive heart failure  BNP elevated. Cards following. Improved on high dose IV Lasix. Replace K++.   Echo: The estimated ejection fraction is 10%.  Grade II left ventricular diastolic dysfunction.  · There are segmental left ventricular wall motion abnormalities. Akinetic apex. No thrombus.   emotional support provided   Cards recs: - Lasix 40 PO BID   -Strict Is and Os  -continue ASA, Plavix  Coreg 3.25 BID   -maintain potassium greater than 4 and magnesium greater than 2  -Jardiance at discharge        COPD (chronic obstructive pulmonary disease)  Dyspnea- resolved   D dimer very elevated. CTA neg for PE, but fluid overload seen.   - continue routine inhalers, diuresis         Alteration in skin integrity in adult  takedown of colovesical and colovaginal fistuli, and partial cecectomy with appendectomy (5/9/22)  CRS recs:   - Midline wound stable on physical exam and on imaging without drainable fluid collection. No indication for surgical intervention at this time. Continue wound care  - Ostomy care  Per [facility] records d/c 7/23: Patient was treated for 5 days with IV antibiotics. Abdominal wall fluid collection spontaneously resolved. Symptoms thought to be from abdominal wall infection.     - will need to continue outpt wound care upon dc.      Asymptomatic bacteruria  - UA not  suggestive of UTI. Would not give further abx. F/u Ucx. Has had 3 negative ucx in last 5 months.      Severe protein-calorie malnutrition   maximize oral intake     CAD (coronary artery disease)  Continue aspirin/ plavix/ BB        VTE Risk Mitigation (From admission, onward)         Ordered     Place sequential compression device  Until discontinued         08/01/22 7396                Discharge Planning   SIVAN: 8/8/2022     Code Status: Full Code   Is the patient medically ready for discharge?: No    Reason for patient still in hospital (select all that apply): Patient trending condition and Treatment. Anticipate dc tomorrow.   Discharge Plan A: Home Health          Abida Caban MD  Department of Hospital Medicine   [facility] Surg

## 2022-08-07 NOTE — NURSING
"0732 Pt in bed resting with eyes closed, resp even and unlabored, arouses to verbal stimuli, vitals done, communication board updated, bed in low locked position, call light in reach    0857 Pt in Bed A&O x4 finished breakfast, scheduled meds given , states will drink K+ but has to drink slowly, set up at bedside will monitor and encourage it,    1142 Pt sitting up in bed looks a little better more cheerful today talking on phone    1244 Pt sitting up in bed states having gas and indigestion prn given    1821 Pt is complaining of nausea and not feeling well has repeatedly been saying she would "wish they would do an angiogram" B/P was low tonight 84/58 and then after having her sit up some went to 96/49 held Lasix tonight , Paged Dr. Caban and discussed with her will place on telemetry and monitor heart  "

## 2022-08-07 NOTE — PLAN OF CARE
Pt appeared to be doing better and feeling better although concerns for needing an angiogram, this evening B/P low and feeling worse again , prn nausea medicine given and team messaged  Problem: Adult Inpatient Plan of Care  Goal: Plan of Care Review  Outcome: Ongoing, Progressing  Goal: Patient-Specific Goal (Individualized)  Outcome: Ongoing, Progressing  Goal: Absence of Hospital-Acquired Illness or Injury  Outcome: Ongoing, Progressing  Goal: Optimal Comfort and Wellbeing  Outcome: Ongoing, Progressing  Goal: Readiness for Transition of Care  Outcome: Ongoing, Progressing     Problem: Infection  Goal: Absence of Infection Signs and Symptoms  Outcome: Ongoing, Progressing     Problem: Impaired Wound Healing  Goal: Optimal Wound Healing  Outcome: Ongoing, Progressing     Problem: Skin Injury Risk Increased  Goal: Skin Health and Integrity  Outcome: Ongoing, Progressing

## 2022-08-07 NOTE — SUBJECTIVE & OBJECTIVE
Interval History: NAEON.        Review of Systems   Constitutional:  Negative for chills and fever.   Respiratory:  Negative for shortness of breath.    Cardiovascular:  Negative for chest pain.   Gastrointestinal:  Positive for nausea (intermittent). Negative for abdominal pain.   Genitourinary:  Negative for dysuria.   Neurological:  Negative for headaches.   Psychiatric/Behavioral:  Negative for confusion.      Objective:     Vital Signs (Most Recent):  Temp: 96.1 °F (35.6 °C) (08/07/22 1115)  Pulse: 75 (08/07/22 1115)  Resp: 18 (08/07/22 1115)  BP: (!) 104/52 (08/07/22 1115)  SpO2: 98 % (08/07/22 1115) Vital Signs (24h Range):  Temp:  [96.1 °F (35.6 °C)-98.2 °F (36.8 °C)] 96.1 °F (35.6 °C)  Pulse:  [71-76] 75  Resp:  [16-18] 18  SpO2:  [95 %-98 %] 98 %  BP: ()/(40-57) 104/52     Weight: 53.5 kg (118 lb)  Body mass index is 20.9 kg/m².    Intake/Output Summary (Last 24 hours) at 8/7/2022 1341  Last data filed at 8/7/2022 1200  Gross per 24 hour   Intake 240 ml   Output 900 ml   Net -660 ml        Physical Exam  Vitals and nursing note reviewed.   Constitutional:       General: She is not in acute distress.     Appearance: She is well-developed.      Interventions: Nasal cannula in place.   HENT:      Head: Normocephalic and atraumatic.   Eyes:      Conjunctiva/sclera: Conjunctivae normal.   Neck:      Vascular: No JVD.   Cardiovascular:      Rate and Rhythm: Normal rate and regular rhythm.      Heart sounds: Normal heart sounds.   Pulmonary:      Effort: Pulmonary effort is normal.      Breath sounds: Normal breath sounds.   Abdominal:      General: Bowel sounds are normal. There is no distension.      Palpations: Abdomen is soft.      Tenderness: There is no abdominal tenderness.   Musculoskeletal:      Cervical back: Neck supple.      Right lower leg: No edema.      Left lower leg: No edema.   Neurological:      Mental Status: She is alert.   Psychiatric:         Mood and Affect: Mood normal.          Behavior: Behavior normal.       Significant Labs: All pertinent labs within the past 24 hours have been reviewed.  CBC:   No results for input(s): WBC, HGB, HCT, PLT in the last 48 hours.    CMP:   Recent Labs   Lab 08/06/22 0307 08/07/22  0549   * 128*   K 3.6 4.0   CL 91* 86*   CO2 31* 35*   * 92   BUN 12 13   CREATININE 0.8 0.8   CALCIUM 8.9 8.7   ANIONGAP 9 7*       Cardiac Markers:   Recent Labs   Lab 08/06/22 0307   BNP 2,699*       Lactic Acid:   No results for input(s): LACTATE in the last 48 hours.      Significant Imaging: I have reviewed all pertinent imaging results/findings within the past 24 hours.

## 2022-08-08 NOTE — NURSING
Patient discharged. Discharge instructions provided, verbalized understanding. IV removed, catheter tip intact. Cardiac monitor removed. Wound care done to buttocks and abdomen. Patient waiting on transportation. Will continue to monitor patient.

## 2022-08-08 NOTE — PT/OT/SLP EVAL
"Physical Therapy Evaluation, Tx and Discharge Note    Patient Name:  Odette Hart   MRN:  [id number]    Recommendations:     Discharge Recommendations:  home   Discharge Equipment Recommendations: none   Barriers to discharge: None    Assessment:     Odette Hart is a 66 y.o. female admitted with a medical diagnosis of Acute on chronic systolic heart failure. .  At this time, patient is functioning at their prior level of function and does not require further acute PT services.     Recent Surgery: * No surgery found *      Plan:     During this hospitalization, patient does not require further acute PT services.  Please re-consult if situation changes.      Subjective     Chief Complaint: "My heart just got weaker."  Patient/Family Comments/goals: return home  Pain/Comfort:  · Pain Rating 1: 0/10  · Pain Rating Post-Intervention 1: 0/10    Patients cultural, spiritual, Taoism conflicts given the current situation: no    Living Environment:  Pt lives alone in [facility] with 4STE and BHRs  Prior to admission, patients level of function was (I) to mod(I).  Equipment used at home: walker, rolling.   Upon discharge, patient will have assistance from granddaughter; limited.    Objective:     Communicated with RN prior to session.  Patient found up in chair with telemetry upon PT entry to room.    General Precautions: Standard, fall   Orthopedic Precautions:N/A   Braces: N/A   Respiratory Status: Room air    Exams:  · Cognitive Exam:  Patient is oriented to Person, Place, Time and Situation  · Sensation:    · -       Intact  · Skin Integrity/Edema:      · -       Skin integrity: Visible skin intact  · RLE ROM: WFL  · RLE Strength: WFL  · LLE ROM: WFL  · LLE Strength: WFL    Functional Mobility:  · Transfers:     · Sit to Stand:  contact guard assistance with hand-held assist  · Gait: Pt [id number] ~40 ft with SBA-CGA and no AD; Pt often cruising on walls or furniture  · Balance: good    AM-PAC 6 CLICK " MOBILITY  Total Score:20       Therapeutic Activities and Exercises:   -Pt education on PT role and POC.  -Importance of E/OOB activity with staff assistance, emphasis on daily participation  -Safety during functional transfer and mobility ensured  -Patient provided with education on importance of Bilateral UB/LB integration during functional tasks for improvement in functional performance.   -Education provided/reviewed, questions answered within PT scope of practice.   -Patient demonstrates understanding and learning this date.       AM-PAC 6 CLICK MOBILITY  Total Score:20     Patient left up in chair with all lines intact.    GOALS:   Multidisciplinary Problems     Physical Therapy Goals     Not on file                History:     Past Medical History:   Diagnosis Date    Acute coronary syndrome     Acute exacerbation of chronic obstructive pulmonary disease (COPD) 3/23/2022    Allergy     Arthritis     Cardiomyopathy     CHF (congestive heart failure)     Coronary artery disease     Coronary artery disease of native artery of native heart with stable angina pectoris 4/19/2021 1/24/2022: [facility]: Cath: LAD: Proximal stent patent. LCX: Proximal 80%. LCX: Dominant. Moderate disease. LCX: HEVER 2.75 x 18 mm. Distal dissection. HEVER 2.75 x 22 mm.     Diverticulitis     Diverticulosis     Familial hypercholesterolemia 1/22/2022    Former smoker 1/24/2022    Heart attack     Heart disease     History of myocardial infarction 1/24/2022    Hyperlipidemia     Hypertension     Hypertension 1/24/2022    ICD (implantable cardioverter-defibrillator) in place     Non-ST elevation myocardial infarction (NSTEMI) 2/4/2019       Past Surgical History:   Procedure Laterality Date    APPENDECTOMY N/A 5/9/2022    Procedure: APPENDECTOMY;  Surgeon: Rafa Cardozo MD;  [facility] OR [address] [place] [address];  Service: Colon and Rectal;  Laterality: N/A;    ATRIAL CARDIAC PACEMAKER INSERTION      CECECTOMY N/A 5/9/2022     Procedure: EXCISION, CECUM;  Surgeon: Rafa Cardozo MD;  Location: [facility] OR [facility] FLR;  Service: Colon and Rectal;  Laterality: N/A;     SECTION      CHOLECYSTECTOMY N/A 2022    Procedure: CHOLECYSTECTOMY;  Surgeon: Doug Epstein MD;  Location: [facility] OR 2ND FLR;  Service: General;  Laterality: N/A;    COLONOSCOPY N/A 2022    Procedure: COLONOSCOPY;  Surgeon: Rafa Cardozo MD;  Location: [facility] OR [facility] FLR;  Service: Colon and Rectal;  Laterality: N/A;    COLOSTOMY  2022    Procedure: CREATION, COLOSTOMY;  Surgeon: Rafa Cardozo MD;  [facility];  Service: Colon and Rectal;;    CORONARY STENT PLACEMENT      LEFT HEART CATHETERIZATION Left 2022    Procedure: CATHETERIZATION, HEART, LEFT;  Surgeon: Yohannes Cordova MD;  Location: [facility] CATH LAB;  Service: Cardiology;  Laterality: Left;       Time Tracking:     PT Received On: 22  PT Start Time: 1140     PT Stop Time: 1201  PT Total Time (min): 21 min     Billable Minutes: Evaluation 10 and Therapeutic Activity 11      2022

## 2022-08-08 NOTE — NURSING
Pt anxious, complained of SOB, nausea and chest pain. All VS stable. BP 96/57. Dr. Cook notified. EKG,  mL bolus, troponin, lactic, BNP and BMP ordered. Phenegran PRN administered as per MD order. Will continue to monitor.

## 2022-08-08 NOTE — PHYSICIAN QUERY
PT Name: Odette Hart  MR #: [id number]     DOCUMENTATION CLARIFICATION     CDS/: Carla Hebert RN CDI     Contact information: [email or web address]                                                                            This form is a permanent document in the medical record.     Query Date: August 8, 2022    By submitting this query, we are merely seeking further clarification of documentation.  Please utilize your independent clinical judgment when addressing the question(s) below.    The Medical Record contains the following:   Indicators   Supporting Clinical Findings Location in Medical Record    Non-blanchable erythema/redness      Ulcer/Injury/Skin Breakdown      Deep Tissue Injury     X Wound care consult Altered Skin Integrity  First assessed 05/09/22 1630 - POA  Sacral spine Full thickness tissue loss. Base is covered by slough and/or eschar in the wound bed  1.5 cm x 1.5 cm x 1.5 cm           Wound care 8/2 S Bell LPN   X Acute/Chronic Illness Acute on chronic systolic and diastolic congestive heart failure  COPD (chronic obstructive pulmonary disease)  Dyspnea- resolved   Alteration in skin integrity in adult    takedown of colovesical and colovaginal fistuli, and partial cecectomy with appendectomy (5/9/22)  CRS recs:   - Midline wound stable on physical exam and on imaging without drainable fluid collection. No indication for surgical intervention at this time. Continue wound care  - Ostomy care  Asymptomatic bacteruria  Severe protein-calorie malnutrition  CAD   H Med 8/7 MAKAYLA Caban MD   X Medication/Treatment The sacral are is cleanse with normal saline and loosely packed with aquacel ag then covered with dry clean dressing daily and prn for saturation   Wound care 8/2 S Vaishnavi LPN    Other       The clinical guidelines noted are only a system guideline. It does not replace the providers clinical judgment.    Per the National Pressure Injury Advisory Panel:   A pressure injury is  localized damage to the skin and underlying soft tissue usually over a bony prominence or related to a medical or other device. The injury can present as intact skin or an open ulcer and may be painful. The injury occurs as a result of intense and/or prolonged pressure or pressure in combination with shear. The tolerance of soft tissue for pressure and shear may also be affected by microclimate, nutrition, perfusion, co-morbidities and condition of the soft tissue.       Stage 1 Pressure Injury:  Intact skin with a localized area of non-blanchable erythema, which may appear differently in darkly pigmented skin. Color changes do not include purple or maroon discoloration; these may indicate deep tissue pressure injury.    Stage 2 Pressure Injury:  Partial-thickness loss of skin with exposed dermis. The wound bed is viable, pink or red, moist, and may also present as an intact or ruptured serum-filled blister.    Stage 3 Pressure Injury:  Full-thickness loss of skin, in which adipose (fat) is visible in the ulcer and granulation tissue and epibole (rolled wound edges) are often present. Slough and/or eschar may be visible. Undermining and tunneling may occur.    Stage 4 Pressure Injury:  Full-thickness skin and tissue loss with exposed or directly palpable fascia, muscle, tendon, ligament, cartilage or bone in the ulcer. Slough and/or eschar may be visible. Epibole (rolled edges), undermining and/or tunneling often occur.    Unstageable Pressure Injury:  Full-thickness skin and tissue loss in which the extent of tissue damage within the ulcer cannot be confirmed because it is obscured by slough or eschar. If slough or eschar is removed, a Stage 3 or Stage 4 pressure injury will be revealed.    Deep Tissue Pressure Injury:  Intact or non-intact skin with localized area of persistent non-blanchable deep red, maroon, purple discoloration or epidermal separation revealing a dark wound bed or blood filled blister. This  "injury results from intense and/or prolonged pressure and shear forces at the bone-muscle interface. The wound may evolve rapidly to reveal the actual extent of tissue injury, or may resolve without tissue loss. If necrotic tissue, subcutaneous tissue, granulation tissue, fascia, muscle or other underlying structures are visible, this indicates a full thickness pressure injury (Unstageable, Stage 3 or Stage 4). Do not use DTPI to describe vascular, traumatic, neuropathic, or dermatologic conditions.   Medical Device Related Pressure Injury: This describes an etiology. Medical device related pressure injuries result from the use of devices designed and applied for diagnostic or therapeutic purposes. The resultant pressure injury generally conforms to the pattern or shape of the device. The injury should be staged using the staging system.    Mucosal Membrane Pressure Injury: Mucosal membrane pressure injury is found on mucous membranes with a history of a medical device in use at the location of the injury. Due to the anatomy of the tissue these ulcers cannot be staged.       Provider, please provide the integumentary diagnosis related to the documentation of  " Sacral Spine":     [ x  ] Pressure Injury/Decubitus Ulcer, Unstageable   [   ] Pressure Injury/Decubitus Ulcer, Other Stage, specify________________   [   ] Other Integumentary Diagnosis (please specify):______________   [  ] Clinically Undetermined     Please document in your progress notes daily for the duration of treatment until resolved and include in your discharge summary.    Reference:    HARSH Garcia., GUANAKO Oliver., Goldberg, M., SHANNON Anne., SHANNON Brown., & SUN Schaeffer. (2016). Revised National Pressure Ulcer Advisory Panel Pressure Injury Staging System: Revised Pressure Injury Staging System. J Wound Ostomy Continence Nurs, 43(6), 585-597. doi:10.1097/[id number]    Form No.52013  "

## 2022-08-08 NOTE — DISCHARGE SUMMARY
[facility] Medicine  Discharge Summary      Patient Name: Odette Hart  MRN: [id number]  Patient Class: IP- Inpatient  Admission Date: 8/1/2022  Hospital Length of Stay: 5 days  Discharge Date and Time: 8/8/2022  1:39 PM  Discharging Provider: Abiad Caban MD  Primary Care Provider: Braxton Barragan MD  [facility] Medicine Team: [facility] MED B Abida Caban MD      HPI:   Ms. Pino is a 66-year-old female with a past medical history that includes adiverticulitis s/p cystoscopy with BL ureteral catheters, open sigmoid colon resection, appendectomy, creation of end colostomy on 5/9/22, heart failure and AICD placement.  Patient presents to the emergency department due to abdominal pain which she states has been going on for the past 2 days after she had some hot sausage. She describes the abdominal pain as aching all over her abdomen she also states that she has been having increased output into her colostomy bag. Additionally patient states that the wound next to her colostomy bag has been draining foul-smelling fluid.  Patient also states that a week ago she had noticed some rectal bleeding but it has since resolved.  She does states that today she had a bowel movement from her rectum which is not something that usually happens.   Patient is also endorsing increasing shortness of breath and lower extremity swelling. She took double her Lasix dose yesterday but her SOB is persistent. She is seen by wound care as an outpatient 4x/week.     Per chart review, she has been readmitted multiple times since her sigmoidectomy, for wound care and SOB/HF exacerbation, most recently to [facility] from 7/18/22 - 7/23/22. There she was treated for a midline wound with wound care and 5 days of abx, as well as for CHF with diuretics.      In the ED, Lab workup was significant for UTI as well as an elevated lactate. She was given antibiotics. She was evaluated by CRS who do not feel surgical intervention  is warranted at this time.       Hospital Course:   She was observed overnight. She continued to feel dyspneic. D dimer and BNP was elevated. CTA revealed she was fluid overloaded and in acute heart failure exacerbation. Cardiology managed her with IV lasix. Repeat echo with severely low EF. Her dyspnea eventually resolved. She continued to have low blood pressures, but tolerated Coreg and lasix. She was cleared for dc home.        Consults:   Consults (From admission, onward)        Status Ordering Provider     Inpatient consult to Cardiology  Once        Provider:  (Not yet assigned)    Completed GIANFRANCO BELTRAN     Inpatient consult to Midline team  Once        Provider:  (Not yet assigned)    Completed GIANFRANCO BELTRAN     Inpatient consult to Colorectal Surgery  Once        Provider:  (Not yet assigned)    Completed RHONDA HARVEY          Acute on chronic systolic and diastolic congestive heart failure  Echo: The estimated ejection fraction is 10%.  Grade II left ventricular diastolic dysfunction.  · There are segmental left ventricular wall motion abnormalities. Akinetic apex. No thrombus.  - Lasix 40 PO BID, continue ASA, Plavix and Coreg 3.25 BID   Cards rec:   -Jardiance at discharge- -patient refused. Will see her cardiologist in a couple of days.         COPD (chronic obstructive pulmonary disease)  Dyspnea- resolved   Stable.         Alteration in skin integrity in adult  takedown of colovesical and colovaginal fistuli, and partial cecectomy with appendectomy (5/9/22)  CRS recs:   - Midline wound stable on physical exam and on imaging without drainable fluid collection. No indication for surgical intervention at this time. Continue wound care  - continue outpt wound care upon dc.      Asymptomatic bacteruria  - ucx w low colony counts of organisms below. Repeat studies at PCP office.      Microbiology Results (last 7 days)     Procedure Component Value Units Date/Time    Urine culture  [[id number]]  (Abnormal)  (Susceptibility) Collected: 08/01/22 1052    Order Status: Completed Specimen: Urine Updated: 08/04/22 1313     Urine Culture, Routine ENTEROBACTER CLOACAE  10,000 - 49,999 cfu/ml        KLEBSIELLA OXYTOCA  10,000 - 49,999 cfu/ml  No other significant isolate      Narrative:      Specimen Source->Urine      MDRO/ VRE    Severe protein-calorie malnutrition   maximize oral intake     CAD (coronary artery disease)  Continue aspirin/ plavix/ BB      Final Active Diagnoses:    Diagnosis Date Noted POA    PRINCIPAL PROBLEM:  Acute on chronic systolic heart failure [I50.23] 05/10/2022 Yes    Hypertriglyceridemia [E78.1] 08/05/2022 Yes    Asymptomatic bacteriuria [R82.71] 08/01/2022 Yes    Prolonged Q-T interval on ECG [R94.31] 05/14/2022 Yes    History of myocardial infarction [I25.2] 01/24/2022 Not Applicable    Coronary artery disease of native artery of native heart with stable angina pectoris [I25.118] 04/19/2021 Yes    RBBB with left anterior fascicular block [I45.2] 04/15/2019 Yes    Chronic congestive heart failure [I50.9] 03/07/2019 Yes    Automatic implantable cardioverter-defibrillator in situ [Z95.810] 02/04/2019 Yes    History of coronary artery stent placement [Z95.5] 02/04/2019 Not Applicable    Ischemic cardiomyopathy [I25.5] 02/04/2019 Yes      Problems Resolved During this Admission:       Discharged Condition: stable    Disposition: Home or Self Care    Follow Up:   Follow-up Information     Braxton Barragan MD Follow up in 1 week(s).    Specialties: Internal Medicine, Pediatrics  Contact information:  [address] [place] [address] [phone]                       Patient Instructions:      Ambulatory referral/consult to Wound Clinic   Standing Status: Future   Referral Priority: Routine Referral Type: Consultation   Referral Reason: Specialty Services Required   Requested Specialty: Wound Care   Number of Visits Requested: 1     Ambulatory referral/consult  to Physical/Occupational Therapy   Standing Status: Future   Referral Priority: Routine Referral Type: Physical Medicine   Referral Reason: Specialty Services Required   Requested Specialty: Physical Therapy   Number of Visits Requested: 1     Diet Cardiac   Order Comments: 1L fluid restriction     Diet Cardiac   Order Comments: 1L fluid restriction     Activity as tolerated       Significant Diagnostic Studies: Cardiac Graphics: Echocardiogram:   Transthoracic echo (TTE) complete (Cupid Only):   Results for orders placed or performed during the hospital encounter of 08/01/22   Echo   Result Value Ref Range    Ascending aorta 2.82 cm    STJ 2.71 cm    AV mean gradient 4 mmHg    Ao peak thad 1.36 m/s    Ao VTI 21.57 cm    IVS 0.79 0.6 - 1.1 cm    LA size 5.19 cm    Left Atrium Major Axis 5.95 cm    Left Atrium Minor Axis 5.93 cm    LVIDd 6.26 (A) 3.5 - 6.0 cm    LVIDs 5.85 (A) 2.1 - 4.0 cm    LVOT diameter 1.76 cm    LVOT peak VTI 14.62 cm    Posterior Wall 0.66 0.6 - 1.1 cm    MV Peak A Thad 0.62 m/s    E wave deceleration time 114.74 msec    MV Peak E Tahd 0.82 m/s    RA Major Axis 4.63 cm    RA Width 3.11 cm    RVDD 3.51 cm    Sinus 2.60 cm    TAPSE 1.40 cm    TR Max Thad 3.20 m/s    TDI LATERAL 0.03 m/s    TDI SEPTAL 0.05 m/s    LA WIDTH 4.35 cm    MV stenosis pressure 1/2 time 33.28 ms    LV Diastolic Volume 198.02 mL    LV Systolic Volume 169.54 mL    RV S' 8.58 cm/s    LVOT peak thad 1.09 m/s    LA volume (mod) 71.41 cm3    LV LATERAL E/E' RATIO 27.33 m/s    LV SEPTAL E/E' RATIO 16.40 m/s    FS 7 %    LA volume 113.99 cm3    LV mass 177.82 g    Left Ventricle Relative Wall Thickness 0.21 cm    AV valve area 1.65 cm2    AV Velocity Ratio 0.80     AV index (prosthetic) 0.68     MV valve area p 1/2 method 6.61 cm2    E/A ratio 1.32     Mean e' 0.04 m/s    LVOT area 2.4 cm2    LVOT stroke volume 35.55 cm3    AV peak gradient 7 mmHg    E/E' ratio 20.50 m/s    Triscuspid Valve Regurgitation Peak Gradient 41 mmHg    BSA  1.54 m2    LV Systolic Volume Index 109.4 mL/m2    LV Diastolic Volume Index 127.75 mL/m2    LA Volume Index 73.5 mL/m2    LV Mass Index 115 g/m2    LA Volume Index (Mod) 46.1 mL/m2    EF 10 %    Narrative    · The left ventricle is moderately enlarged with eccentric hypertrophy and   severely decreased systolic function.  · The estimated ejection fraction is 10%.  · Grade II left ventricular diastolic dysfunction.  · Moderate left atrial enlargement.  · Moderate mitral regurgitation.  · No obvious endocarditis  · Normal right ventricular size with low normal right ventricular systolic   function.  · Mild to moderate tricuspid regurgitation.  · Mild aortic regurgitation.  · There are segmental left ventricular wall motion abnormalities. Akinetic   apex. No thrombus.          Pending Diagnostic Studies:     None         Medications:  Reconciled Home Medications:      Medication List      START taking these medications    JARDIANCE 10 mg tablet  Generic drug: empagliflozin  Take 1 tablet (10 mg total) by mouth once daily.        CHANGE how you take these medications    furosemide 40 MG tablet  Commonly known as: LASIX  Take 1 tablet (40 mg total) by mouth 2 (two) times daily.  What changed:   · medication strength  · how much to take  · when to take this        CONTINUE taking these medications    acetaminophen 500 MG tablet  Commonly known as: TYLENOL  Take 2 tablets (1,000 mg total) by mouth every 8 (eight) hours as needed for Pain.     albuterol 90 mcg/actuation inhaler  Commonly known as: PROVENTIL/VENTOLIN HFA  Inhale 2 puffs into the lungs every 6 (six) hours as needed for Wheezing. Rescue     ALPRAZolam 0.5 MG tablet  Commonly known as: XANAX  Take 1 tablet (0.5 mg total) by mouth nightly as needed for Anxiety.     aspirin 81 MG EC tablet  Commonly known as: ECOTRIN  Take 1 tablet (81 mg total) by mouth once daily.     carvediloL 3.125 MG tablet  Commonly known as: COREG  Take 1 tablet (3.125 mg total) by mouth  2 (two) times daily.     clopidogreL 75 mg tablet  Commonly known as: PLAVIX  Take 1 tablet (75 mg total) by mouth once daily.     collagenase ointment  Commonly known as: SANTYL  Apply topically once daily.     docusate sodium 100 MG capsule  Commonly known as: COLACE  Take 1 capsule (100 mg total) by mouth 2 (two) times daily.     esomeprazole 40 MG capsule  Commonly known as: NEXIUM  Take 1 capsule (40 mg total) by mouth before breakfast.     ezetimibe 10 mg tablet  Commonly known as: ZETIA  Take 1 tablet (10 mg total) by mouth every evening.     fluticasone propionate 50 mcg/actuation nasal spray  Commonly known as: FLONASE  1 spray (50 mcg total) by Each Nostril route once daily.     HYDROcodone-acetaminophen 5-325 mg per tablet  Commonly known as: NORCO  Take 1 tablet by mouth every 12 (twelve) hours as needed for Pain.     nitroGLYCERIN 0.4 MG SL tablet  Commonly known as: NITROSTAT  Place 1 tablet (0.4 mg total) under the tongue every 5 (five) minutes as needed for Chest pain.     ondansetron 8 MG Tbdl  Commonly known as: ZOFRAN-ODT  Take 1 tablet (8 mg total) by mouth every 12 (twelve) hours as needed (nausea).     polyethylene glycol 17 gram/dose powder  Commonly known as: GLYCOLAX  Take 17 g by mouth once daily.     simethicone 80 MG chewable tablet  Commonly known as: MYLICON  Take 1 tablet (80 mg total) by mouth every 8 (eight) hours as needed for Flatulence.        STOP taking these medications    metoprolol succinate 25 MG 24 hr tablet  Commonly known as: TOPROL-XL     pantoprazole 40 MG tablet  Commonly known as: PROTONIX     prasugreL 10 mg Tab  Commonly known as: EFFIENT     spironolactone 25 MG tablet  Commonly known as: ALDACTONE            Indwelling Lines/Drains at time of discharge:   Lines/Drains/Airways     Drain  Duration                Colostomy 05/09/22 1100 Descending/sigmoid LLQ 91 days                Time spent on the discharge of patient: 36 minutes  Patient examined at bedside on day  of discharge. Exam findings stable. She was deemed safe for discharge.       Abida Caban MD  Department of Hospital Medicine  [facility]

## 2022-08-08 NOTE — PLAN OF CARE
Problem: Adult Inpatient Plan of Care  Goal: Plan of Care Review  Outcome: Met  Goal: Patient-Specific Goal (Individualized)  Outcome: Met  Goal: Absence of Hospital-Acquired Illness or Injury  Outcome: Met  Goal: Optimal Comfort and Wellbeing  Outcome: Met  Goal: Readiness for Transition of Care  Outcome: Met     Problem: Infection  Goal: Absence of Infection Signs and Symptoms  Outcome: Met     Problem: Impaired Wound Healing  Goal: Optimal Wound Healing  Outcome: Met     Problem: Skin Injury Risk Increased  Goal: Skin Health and Integrity  Outcome: Met

## 2022-08-08 NOTE — HOSPITAL COURSE
She was observed overnight. She continued to feel dyspneic. D dimer and BNP was elevated. CTA revealed she was fluid overloaded and in acute heart failure exacerbation. Cardiology managed her with IV lasix. Repeat echo with severely low EF. Her dyspnea eventually resolved. She continued to have low blood pressures, but tolerated Coreg and lasix. She was cleared for dc home.

## 2022-08-09 NOTE — PROGRESS NOTES
C3 nurse attempted to contact Odette Hart for a TCC post hospital discharge follow up call. No answer. Left voicemail with callback information. Also [place] and left message.The patient has a scheduled appointment with Braxton Barragan MD on 8/11/2022 @ 2702. Message to PCP to change to hospfu from estab pt.

## 2022-08-10 PROBLEM — Z78.9 STATIN INTOLERANCE: Status: ACTIVE | Noted: 2022-01-01

## 2022-08-10 NOTE — PROGRESS NOTES
C3 nurse spoke with Odette Hart for a TCC post hospital discharge follow up call. The patient has a scheduled [facility] appointment with Braxton Barragan MD on 8/11/2022 @ 1230.

## 2022-08-10 NOTE — PATIENT INSTRUCTIONS
Fluid restriction  to 1000 ml.     Increase protein in diet.     Will reduce the furosemide to 40 mg daily prn swelling of legs     Will try low dose valsartan 40 mg daily     Discussed low potassium diet     RTC 2 weeks with labs

## 2022-08-10 NOTE — TELEPHONE ENCOUNTER
----- Message from Ruben Nicolas LPN sent at 8/10/2022 10:25 AM CDT -----  Regarding: Pt questioning Jardiance Rx  Pt was Rx'd Jardiance in hospital. Says she is not diabetic, nor was she told that she is. Please discuss with her @ 8/11/2022 HOSPFU visit.    Respectfully,  Ruben Nicolas LPN

## 2022-08-10 NOTE — PROGRESS NOTES
"  Subjective:      Patient ID: Odette Hart is a 66 y.o. female.    Chief Complaint: Hospital Follow Up (Recent admit on 08/01/2022), Nausea, and Shortness of Breath    HPI:  [place] [facility] [place]    Pt goes to wound care for sacral decubitus which is slowly improving.    Short of breath at times    Pt had some chest pains during recent hospitalization.    Review of Systems   Cardiovascular: Positive for chest pain, dyspnea on exertion and leg swelling (prior to recent hospitalization for CHF). Negative for claudication, irregular heartbeat, near-syncope, orthopnea, palpitations and syncope.      Abdominal wound drainage continues to improve but has not completely resolved    Note pt was intolerant to atorvastatin and pravastatin.    Pt has a hx of "fluid around the heart and high potassium"when on lisinopril and losartan in the past.        Past Medical History:   Diagnosis Date    Acute coronary syndrome     Acute exacerbation of chronic obstructive pulmonary disease (COPD) 3/23/2022    Allergy     Arthritis     Cardiomyopathy     CHF (congestive heart failure)     Coronary artery disease     Coronary artery disease of native artery of native heart with stable angina pectoris 4/19/2021 1/24/2022: CBC: Cath: LAD: Proximal stent patent. LCX: Proximal 80%. LCX: Dominant. Moderate disease. LCX: HEVER 2.75 x 18 mm. Distal dissection. HEVER 2.75 x 22 mm.     Diverticulitis     Diverticulosis     Familial hypercholesterolemia 1/22/2022    Former smoker 1/24/2022    Heart attack     Heart disease     History of myocardial infarction 1/24/2022    Hyperlipidemia     Hypertension     Hypertension 1/24/2022    ICD (implantable cardioverter-defibrillator) in place     Non-ST elevation myocardial infarction (NSTEMI) 2/4/2019        Past Surgical History:   Procedure Laterality Date    APPENDECTOMY N/A 5/9/2022    Procedure: APPENDECTOMY;  Surgeon: Rafa Cardozo MD;  " Location: [facility] 2ND FLR;  Service: Colon and Rectal;  Laterality: N/A;    ATRIAL CARDIAC PACEMAKER INSERTION      CECECTOMY N/A 2022    Procedure: EXCISION, CECUM;  Surgeon: Rafa Cardozo MD;  Location: [facility] FLR;  Service: Colon and Rectal;  Laterality: N/A;     SECTION      CHOLECYSTECTOMY N/A 2022    Procedure: CHOLECYSTECTOMY;  Surgeon: Doug Epstein MD;  Location: [facility] FLR;  Service: General;  Laterality: N/A;    COLONOSCOPY N/A 2022    Procedure: COLONOSCOPY;  Surgeon: Rafa Cardozo MD;  Location: [facility] FLR;  Service: Colon and Rectal;  Laterality: N/A;    COLOSTOMY  2022    Procedure: CREATION, COLOSTOMY;  Surgeon: Rafa Cardozo MD;  Location: [facility] OR [facility] FLR;  Service: Colon and Rectal;;    CORONARY STENT PLACEMENT      LEFT HEART CATHETERIZATION Left 2022    Procedure: CATHETERIZATION, HEART, LEFT;  Surgeon: Yohannes Cordova MD;  Location: [facility] CATH LAB;  Service: Cardiology;  Laterality: Left;       Family History   Problem Relation Age of Onset    Heart disease Mother     Emphysema Father     Heart attack Brother        Social History     Socioeconomic History    Marital status:    Tobacco Use    Smoking status: Former Smoker     Packs/day: 0.50     Start date: 1976     Quit date: 2012     Years since quittin.6    Smokeless tobacco: Never Used   Substance and Sexual Activity    Alcohol use: No    Drug use: No     Social Determinants of Health     Financial Resource Strain: Low Risk     Difficulty of Paying Living Expenses: Not hard at all   Food Insecurity: No Food Insecurity    Worried About Running Out of Food in the Last Year: Never true    Ran Out of Food in the Last Year: Never true   Transportation Needs: No Transportation Needs    Lack of Transportation (Medical): No    Lack of Transportation (Non-Medical): No   Physical Activity: Inactive    Days of Exercise per Week: 0 days    Minutes  of Exercise per Session: 0 min   Stress: No Stress Concern Present    Feeling of Stress : Not at all   Social Connections: Socially Isolated    Frequency of Communication with Friends and Family: More than three times a week    Frequency of Social Gatherings with Friends and Family: Once a week    Attends Mandaeism Services: Never    Active Member of Clubs or Organizations: No    Attends Club or Organization Meetings: Never    Marital Status:    Housing Stability: Low Risk     Unable to Pay for Housing in the Last Year: No    Number of Places Lived in the Last Year: 1    Unstable Housing in the Last Year: No       Current Outpatient Medications on File Prior to Visit   Medication Sig Dispense Refill    acetaminophen (TYLENOL) 500 MG tablet Take 2 tablets (1,000 mg total) by mouth every 8 (eight) hours as needed for Pain.  0    albuterol (PROVENTIL/VENTOLIN HFA) 90 mcg/actuation inhaler Inhale 2 puffs into the lungs every 6 (six) hours as needed for Wheezing. Rescue 18 g 1    ALPRAZolam (XANAX) 0.5 MG tablet Take 1 tablet (0.5 mg total) by mouth nightly as needed for Anxiety. 30 tablet 1    aspirin (ECOTRIN) 81 MG EC tablet Take 1 tablet (81 mg total) by mouth once daily. 90 tablet 0    carvediloL (COREG) 3.125 MG tablet Take 1 tablet (3.125 mg total) by mouth 2 (two) times daily. 60 tablet 11    clopidogreL (PLAVIX) 75 mg tablet Take 1 tablet (75 mg total) by mouth once daily. 30 tablet 1    collagenase (SANTYL) ointment Apply topically once daily. 90 g 1    docusate sodium (COLACE) 100 MG capsule Take 1 capsule (100 mg total) by mouth 2 (two) times daily. 60 capsule 1    esomeprazole (NEXIUM) 40 MG capsule Take 1 capsule (40 mg total) by mouth before breakfast. 30 capsule 11    ezetimibe (ZETIA) 10 mg tablet Take 1 tablet (10 mg total) by mouth every evening. 90 tablet 3    fluticasone propionate (FLONASE) 50 mcg/actuation nasal spray 1 spray (50 mcg total) by Each Nostril route once  daily. 9.9 mL 1    HYDROcodone-acetaminophen (NORCO) 5-325 mg per tablet Take 1 tablet by mouth every 12 (twelve) hours as needed for Pain. 10 tablet 0    nitroGLYCERIN (NITROSTAT) 0.4 MG SL tablet Place 1 tablet (0.4 mg total) under the tongue every 5 (five) minutes as needed for Chest pain. 25 tablet 0    ondansetron (ZOFRAN-ODT) 8 MG TbDL Take 1 tablet (8 mg total) by mouth every 12 (twelve) hours as needed (nausea). 20 tablet 2    polyethylene glycol (GLYCOLAX) 17 gram/dose powder Take 17 g by mouth once daily. 510 g 1    simethicone (MYLICON) 80 MG chewable tablet Take 1 tablet (80 mg total) by mouth every 8 (eight) hours as needed for Flatulence. 120 tablet 5    [DISCONTINUED] furosemide (LASIX) 40 MG tablet Take 1 tablet (40 mg total) by mouth 2 (two) times daily. 60 tablet 11    empagliflozin (JARDIANCE) 10 mg tablet Take 1 tablet (10 mg total) by mouth once daily. (Patient not taking: No sig reported) 30 tablet 2    [DISCONTINUED] metoprolol succinate (TOPROL-XL) 25 MG 24 hr tablet Take 1 tablet (25 mg total) by mouth once daily. 1 tablet 0    [DISCONTINUED] pantoprazole (PROTONIX) 40 MG tablet Take 1 tablet (40 mg total) by mouth once daily. 30 tablet 1    [DISCONTINUED] prasugreL (EFFIENT) 10 mg Tab Take 1 tablet (10 mg total) by mouth once daily. 90 tablet 0    [DISCONTINUED] spironolactone (ALDACTONE) 25 MG tablet Take 12.5 mg by mouth once daily.       No current facility-administered medications on file prior to visit.       Review of patient's allergies indicates:   Allergen Reactions    Augmentin [amoxicillin-pot clavulanate] Swelling     Not allergic to amoxicillin just a derivative of augmentin    Lisinopril Other (See Comments)     Fluid around heart    Losartan Other (See Comments)     High potassium    Shellfish containing products Anaphylaxis     Pt.states she is allergic to SEAFOOD since the age of 13    Levaquin [levofloxacin] Other (See Comments)     Very bad joint pain     "Clindamycin Palpitations     Chest pain     Objective:     Vitals:    08/10/22 1354   BP: (!) 108/59   BP Location: Right arm   Patient Position: Sitting   BP Method: Medium (Automatic)   Pulse: 85   SpO2: 98%   Height: 5' 3" (1.6 m)        Physical Exam  Vitals reviewed.   Constitutional:       Appearance: She is well-developed.   Eyes:      General: No scleral icterus.  Neck:      Vascular: No carotid bruit or JVD.   Cardiovascular:      Rate and Rhythm: Normal rate and regular rhythm.      Heart sounds: No murmur heard.    No gallop.   Pulmonary:      Breath sounds: Rales (few crackles right base) present.   Skin:     General: Skin is warm and dry.   Neurological:      Mental Status: She is alert and oriented to person, place, and time.   Psychiatric:         Behavior: Behavior normal.         Thought Content: Thought content normal.         Judgment: Judgment normal.              Accession #: [id number]    Transthoracic echo (TTE) complete with contrast  Order# [id number]  Reading physician: Hayder Alvarado MD Ordering physician: Abida Caban MD Study date: 8/4/22       Reason for Exam  Priority: Routine  Dx: CHF (congestive heart failure) [I50.9 (ICD-10-CM)]     View Images Vital Vitrea     Show images for Echo    Summary    · The left ventricle is moderately enlarged with eccentric hypertrophy and severely decreased systolic function.  · The estimated ejection fraction is 10%.  · Grade II left ventricular diastolic dysfunction.  · Moderate left atrial enlargement.  · Moderate mitral regurgitation.  · No obvious endocarditis  · Normal right ventricular size with low normal right ventricular systolic function.  · Mild to moderate tricuspid regurgitation.  · Mild aortic regurgitation.  · There are segmental left ventricular wall motion abnormalities. Akinetic apex. No thrombus.               MyChart Results Release    MyChart Status: Active  Results Release         Contains abnormal data CBC Auto " Differential  Order: [id number]  · Status: Final result   · Visible to patient: Yes (seen)    · Next appt: 08/11/2022 at 10:00 AM in Outpatient Rehab ([facility])    · Dx: Peripheral edema    · 0 Result Notes    Component Ref Range & Units 1 d ago   (8/9/22) 9 d ago   (8/1/22) 2 wk ago   (7/23/22) 2 wk ago   (7/22/22) 2 wk ago   (7/21/22) 3 wk ago   (7/20/22) 3 wk ago   (7/19/22)   WBC 3.90 - 12.70 K/uL 3.58 Low   2.25 Low   2.72 Low   2.10 Low   2.19 Low   1.85 Low Panic  CM  2.50 Low     RBC 4.00 - 5.40 M/uL 4.68  3.85 Low   3.86 Low   3.66 Low   3.75 Low   3.95 Low   3.73 Low     Hemoglobin 12.0 - 16.0 g/dL 12.3  10.4 Low   10.3 Low   9.6 Low   10.1 Low   10.5 Low   10.0 Low     Hematocrit 37.0 - 48.5 % 39.9  33.3 Low   31.9 Low   30.4 Low   30.8 Low   32.2 Low   29.9 Low     MCV 82 - 98 fL 85  87  83  83  82  82  80 Low     MCH 27.0 - 31.0 pg 26.3 Low   27.0  26.7 Low   26.2 Low   26.9 Low   26.6 Low   26.8 Low     MCHC 32.0 - 36.0 g/dL 30.8 Low   31.2 Low   32.3  31.6 Low   32.8  32.6  33.4    RDW 11.5 - 14.5 % 22.0 High   22.6 High   19.5 High   19.0 High   18.4 High   18.2 High   18.0 High     Platelets 150 - 450 K/uL 357  369  313  301  293  285  262    MPV 9.2 - 12.9 fL 10.0  9.6  9.7  9.7  10.0  10.4  10.6    Immature Granulocytes 0.0 - 0.5 % CANCELED  0.4  0.4     0.0    Comment: Result canceled by the ancillary.   Immature Grans (Abs) 0.00 - 0.04 K/uL CANCELED  0.01 CM  0.01 CM     0.00 CM    Comment: Mild elevation in immature granulocytes is non specific and   can be seen in a variety of conditions including stress response,   acute inflammation, trauma and pregnancy. Correlation with other   laboratory and clinical findings is essential.               MyChart Results Release    MyChart Status: Active  Results Release         Contains abnormal data BNP  Order: [id number]  · Status: Final result   · Visible to patient: Yes (seen)    · Next appt: 08/11/2022 at 10:00 AM in Outpatient Rehab ([facility])    · Dx: Peripheral edema    · 0 Result Notes    Component Ref Range & Units 1 d ago   (8/9/22) 3 d ago   (8/7/22) 4 d ago   (8/6/22) 8 d ago   (8/2/22) 3 wk ago   (7/18/22) 2 mo ago   (6/8/22) 2 mo ago   (5/23/22)   BNP 0 - 99 pg/mL 1,977 High   1,321 High  CM  2,699 High  CM  2,596 High  CM  4,567 High  CM  >4,900 High  CM  3,340 High  CM    Comment: Values of less than 100 pg/ml are consistent with non-CHF populations.   Resulting Agency  [facility]              Specimen Collected: 08/09/22 10:44 Last Resulted: 08/09/22 11:40      ·   Lab Flowsheet    ·  Order Details    ·  View Encounter    ·  Lab and Collection Details    ·  Routing   ·  Result History       CM=Additional comments          Result Care Coordination        Patient Communication     Add Comments   Seen Back to Top           All Reviewers List    Scout Smith MD on 8/9/2022 23:54     Order: [id number]  · Status: Final result   · Visible to patient: Yes (seen)    · Next appt: 08/11/2022 at 10:00 AM in Outpatient Rehab ([facility])    · Dx: Peripheral edema    · 0 Result Notes    Component Ref Range & Units 1 d ago   (8/9/22) 2 d ago   (8/8/22) 3 d ago   (8/7/22) 3 d ago   (8/7/22) 4 d ago   (8/6/22) 5 d ago   (8/5/22) 6 d ago   (8/4/22)   Sodium 136 - 145 mmol/L 128 Low   131 Low   126 Low   128 Low   131 Low   131 Low   135 Low     Potassium 3.5 - 5.1 mmol/L 4.0  4.1  4.2  4.0  3.6  3.5  3.0 Low     Chloride 95 - 110 mmol/L 90 Low   90 Low   88 Low   86 Low   91 Low   87 Low   92 Low     CO2 23 - 29 mmol/L 29  34 High   30 High   35 High   31 High   34 High   33 High     Glucose 70 - 110 mg/dL 105  89  103  92  134 High   126 High   108    BUN 8 - 23 mg/dL 13  10  13  13  12  11  11    Creatinine 0.5 - 1.4 mg/dL 0.8  0.8  0.8  0.8  0.8  0.8  0.7    Calcium 8.7 - 10.5 mg/dL 8.9  8.9  8.4 Low   8.7  8.9  8.7  8.2 Low     Total Protein 6.0 - 8.4 g/dL 8.6 High           Albumin 3.5 - 5.2 g/dL  2.4 Low           Total Bilirubin 0.1 - 1.0 mg/dL 1.1 High           Comment: For infants and newborns, interpretation of results should be based   on gestational age, weight and in agreement with clinical   observations.     Premature Infant recommended reference ranges:   Up to 24 hours.............<8.0 mg/dL   Up to 48 hours............<12.0 mg/dL   3-5 days..................<15.0 mg/dL   6-29 days.................<15.0 mg/dL    Alkaline Phosphatase 55 - 135 U/L 131          AST 10 - 40 U/L 36          ALT 10 - 44 U/L 120 High           Anion Gap 8 - 16 mmol/L 9  7 Low   8  7 Low   9  10  10    eGFR >60 mL/min/1.73 m^2 >60.0  >60.0  >60.0  >60.0  >60.0  >60.0  >60.0    Resulting Agency  [facility]              Specimen Collected: 08/09/22 10:44 Last Resulted: 08/09/22 12:29      ·   Lab Flowsheet    ·  Order Details    ·  View Encounter    ·  Lab and Collection Details    ·  Routing   ·  Result History            Result Care Coordination          Accession #: [id number]    Odette Hart  Cardiac catheterization  Order# [id number]  Reading physician: Yohannes Cordova MD Ordering physician: Yohannes Cordova MD Study date: 1/24/22       Patient Information    Name MRN Description   Odette Hart [id number] 66 y.o. female     Physicians    Panel Physicians Referring Physician Case Authorizing Physician   Yohannes Cordova MD (Primary)       Indications    NSTEMI (non-ST elevated myocardial infarction) [I21.4 (ICD-10-CM)]     Summary       · The left ventricular end diastolic pressure was elevated.  · The pre-procedure left ventricular end diastolic pressure was 20.  · The Mid LAD lesion was 70% stenosed.  · The Prox Cx lesion was 80% stenosed with 0% stenosis post-intervention.  · The Mid Cx lesion was 90% stenosed with 0% stenosis post-intervention.  · The Prox RCA lesion was 50% stenosed.  · The RPDA lesion was 70% stenosed.  · The RPAV lesion was 60% stenosed.  · A STENT RESOLUTE  AMBER [id number] stent was successfully placed at 16 PJ for 10 sec.  · A STENT RESOLUTE AMBER [id number] stent was successfully placed at 12 PJ for 15 sec.  · The estimated blood loss was <50 mL.  · There was three vessel coronary artery disease.  · The PCI was successful.         Assessment:     1. Chronic combined systolic and diastolic congestive heart failure    2. Coronary artery disease involving native coronary artery of native heart without angina pectoris    3. Automatic implantable cardioverter-defibrillator in situ    4. Familial hypercholesterolemia    5. History of coronary artery stent placement    6. History of myocardial infarction    7. Ischemic cardiomyopathy    8. Primary hypertension    9. RBBB with left anterior fascicular block    10. Severe protein-calorie malnutrition    11. Colostomy in place    12. History of partial colectomy    13. Alteration in skin integrity in adult    14. Statin intolerance    15. Hyponatremia      Plan:   Odette was seen today for hospital follow up, nausea and shortness of breath.    Diagnoses and all orders for this visit:    Chronic combined systolic and diastolic congestive heart failure  -     Comprehensive Metabolic Panel; Future    Coronary artery disease involving native coronary artery of native heart without angina pectoris  -     Comprehensive Metabolic Panel; Future    Automatic implantable cardioverter-defibrillator in situ  -     Comprehensive Metabolic Panel; Future    Familial hypercholesterolemia  -     Comprehensive Metabolic Panel; Future    History of coronary artery stent placement  -     Comprehensive Metabolic Panel; Future    History of myocardial infarction  -     Comprehensive Metabolic Panel; Future    Ischemic cardiomyopathy  -     Ambulatory referral/consult to Transplant, Heart; Future  -     Comprehensive Metabolic Panel; Future    Primary hypertension  -     Comprehensive Metabolic Panel; Future    RBBB with left anterior fascicular  block  -     Comprehensive Metabolic Panel; Future    Severe protein-calorie malnutrition  -     Comprehensive Metabolic Panel; Future    Colostomy in place  -     Comprehensive Metabolic Panel; Future    History of partial colectomy  -     Comprehensive Metabolic Panel; Future    Alteration in skin integrity in adult  -     Comprehensive Metabolic Panel; Future    Statin intolerance  -     Comprehensive Metabolic Panel; Future    Hyponatremia  -     Ambulatory referral/consult to Transplant, Heart; Future  -     Comprehensive Metabolic Panel; Future    Other orders  -     valsartan (DIOVAN) 40 MG tablet; Take 1 tablet (40 mg total) by mouth once daily.  -     furosemide (LASIX) 40 MG tablet; Take 1 tablet (40 mg total) by mouth once daily. Prn wt gain or shortness of breath     More intense fluid restriction discussed in detail    Strategies to increase protein in diet discussed    Will reduce the furosemide to 40 mg daily prn swelling of legs    Will try low dose valsartan 40 mg daily    Discussed low potassium diet    RTC 2 weeks with CMP    Will try to titrate carvedilol and valsartan (and hopefully Entresto) as tolerated    Consult advanced heart failure clinic    Follow up in about 2 weeks (around 8/24/2022).

## 2022-08-11 NOTE — PROGRESS NOTES
Subjective:       Patient ID: Odette Hart is a 66 y.o. female.    Chief Complaint: Hospital Follow Up (Heart failure), Rectal Pain (Pain on her butt - bed sore from the hospital), Nausea (Wants to try Phenergan ), and Shortness of Breath    HPI   Pt visit today for f/u from the hospital she was admitted again for CHF fluid overload sob her last EF 10% was 20% few months ago pt states her breathing is better after lost the flud by diuretic she is now on coreg and valsartan she can't tolerate Entresto today she c/o tired fatigue no appetde wt loss  And rt shoulder pain lower back pain and pain from sacral decubitus just had wound care pt prefer not open the wound cover 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   Constitutional:       General: She is not in acute distress.     Appearance: She is well-developed. She is ill-appearing.      Comments: Wt loss   HENT:      Head: Normocephalic and atraumatic.      Right Ear: External ear normal.      Left Ear: External ear normal.      Nose: Nose normal.   Eyes:      Extraocular Movements: Extraocular movements intact.      Conjunctiva/sclera: Conjunctivae normal.      Pupils: Pupils are equal, round, and reactive to light.   Neck:      Thyroid: No thyromegaly.   Cardiovascular:      Rate and Rhythm: Normal rate. Rhythm irregular.      Heart sounds: Normal heart sounds. No murmur heard.    No friction rub. No gallop.   Pulmonary:      Effort: Pulmonary effort is normal. No respiratory distress.      Breath sounds: Normal breath sounds. No wheezing.   Abdominal:      General: Bowel sounds are normal. There is no distension.      Palpations: Abdomen is soft.      Tenderness: There is no abdominal tenderness.   Musculoskeletal:         General: No tenderness or deformity. Normal range of motion.      Cervical back: Normal range of motion and neck supple.   Lymphadenopathy:       Cervical: No cervical adenopathy.   Skin:     General: Skin is warm and dry.      Findings: No erythema or rash.   Neurological:      Mental Status: She is alert and oriented to person, place, and time.   Psychiatric:         Thought Content: Thought content normal.         Judgment: Judgment normal.         Assessment:       1. Chronic combined systolic and diastolic congestive heart failure    2. Anxiety    3. Bronchitis    4. Sacral decubitus ulcer, stage III    5. Encounter for screening mammogram for breast cancer    6. Weight loss    7. FTT (failure to thrive) in adult    8. Chronic midline low back pain without sciatica    9. Depression, unspecified depression type    10. Atrial fibrillation, chronic        Plan:       Chronic combined systolic and diastolic congestive heart failure    Anxiety  -     ALPRAZolam (XANAX) 0.5 MG tablet; Take 1 tablet (0.5 mg total) by mouth nightly as needed for Anxiety.  Dispense: 30 tablet; Refill: 1    Bronchitis  -     albuterol (PROVENTIL/VENTOLIN HFA) 90 mcg/actuation inhaler; Inhale 2 puffs into the lungs every 6 (six) hours as needed for Wheezing. Rescue  Dispense: 18 g; Refill: 1    Sacral decubitus ulcer, stage III  -     HYDROcodone-acetaminophen (NORCO) 5-325 mg per tablet; Take 1 tablet by mouth every 12 (twelve) hours as needed for Pain.  Dispense: 14 tablet; Refill: 0    Encounter for screening mammogram for breast cancer  -     Mammo Digital Screening Jason [facility]/ [facility]; Future; Expected date: 08/11/2022    Weight loss  -     food supplemt, lactose-reduced (ENSURE HIGH PROTEIN) Liqd; Take 240 mLs by mouth 2 (two) times a day.  Dispense: 60 each; Refill: 3  -     mirtazapine (REMERON) 15 MG tablet; Take 1 tablet (15 mg total) by mouth every evening.  Dispense: 30 tablet; Refill: 11    FTT (failure to thrive) in adult  -     food supplemt, lactose-reduced (ENSURE HIGH PROTEIN) Liqd; Take 240 mLs by mouth 2 (two) times a day.  Dispense: 60 each; Refill: 3  -      mirtazapine (REMERON) 15 MG tablet; Take 1 tablet (15 mg total) by mouth every evening.  Dispense: 30 tablet; Refill: 11    Chronic midline low back pain without sciatica    Depression, unspecified depression type  -     mirtazapine (REMERON) 15 MG tablet; Take 1 tablet (15 mg total) by mouth every evening.  Dispense: 30 tablet; Refill: 11    Atrial fibrillation, chronic  Comments:  continue with tx and anticoagulant        Medication List with Changes/Refills   New Medications    FOOD SUPPLEMT, LACTOSE-REDUCED (ENSURE HIGH PROTEIN) LIQD    Take 240 mLs by mouth 2 (two) times a day.    MIRTAZAPINE (REMERON) 15 MG TABLET    Take 1 tablet (15 mg total) by mouth every evening.   Current Medications    ACETAMINOPHEN (TYLENOL) 500 MG TABLET    Take 2 tablets (1,000 mg total) by mouth every 8 (eight) hours as needed for Pain.    ASPIRIN (ECOTRIN) 81 MG EC TABLET    Take 1 tablet (81 mg total) by mouth once daily.    CARVEDILOL (COREG) 3.125 MG TABLET    Take 1 tablet (3.125 mg total) by mouth 2 (two) times daily.    CLOPIDOGREL (PLAVIX) 75 MG TABLET    Take 1 tablet (75 mg total) by mouth once daily.    COLLAGENASE (SANTYL) OINTMENT    Apply topically once daily.    DOCUSATE SODIUM (COLACE) 100 MG CAPSULE    Take 1 capsule (100 mg total) by mouth 2 (two) times daily.    ESOMEPRAZOLE (NEXIUM) 40 MG CAPSULE    Take 1 capsule (40 mg total) by mouth before breakfast.    EZETIMIBE (ZETIA) 10 MG TABLET    Take 1 tablet (10 mg total) by mouth every evening.    FLUTICASONE PROPIONATE (FLONASE) 50 MCG/ACTUATION NASAL SPRAY    1 spray (50 mcg total) by Each Nostril route once daily.    FUROSEMIDE (LASIX) 40 MG TABLET    Take 1 tablet (40 mg total) by mouth once daily. Prn wt gain or shortness of breath    NITROGLYCERIN (NITROSTAT) 0.4 MG SL TABLET    Place 1 tablet (0.4 mg total) under the tongue every 5 (five) minutes as needed for Chest pain.    ONDANSETRON (ZOFRAN-ODT) 8 MG TBDL    Take 1 tablet (8 mg total) by mouth every 12  (twelve) hours as needed (nausea).    POLYETHYLENE GLYCOL (GLYCOLAX) 17 GRAM/DOSE POWDER    Take 17 g by mouth once daily.    SIMETHICONE (MYLICON) 80 MG CHEWABLE TABLET    Take 1 tablet (80 mg total) by mouth every 8 (eight) hours as needed for Flatulence.    VALSARTAN (DIOVAN) 40 MG TABLET    Take 1 tablet (40 mg total) by mouth once daily.   Changed and/or Refilled Medications    Modified Medication Previous Medication    ALBUTEROL (PROVENTIL/VENTOLIN HFA) 90 MCG/ACTUATION INHALER albuterol (PROVENTIL/VENTOLIN HFA) 90 mcg/actuation inhaler       Inhale 2 puffs into the lungs every 6 (six) hours as needed for Wheezing. Rescue    Inhale 2 puffs into the lungs every 6 (six) hours as needed for Wheezing. Rescue    ALPRAZOLAM (XANAX) 0.5 MG TABLET ALPRAZolam (XANAX) 0.5 MG tablet       Take 1 tablet (0.5 mg total) by mouth nightly as needed for Anxiety.    Take 1 tablet (0.5 mg total) by mouth nightly as needed for Anxiety.    HYDROCODONE-ACETAMINOPHEN (NORCO) 5-325 MG PER TABLET HYDROcodone-acetaminophen (NORCO) 5-325 mg per tablet       Take 1 tablet by mouth every 12 (twelve) hours as needed for Pain.    Take 1 tablet by mouth every 12 (twelve) hours as needed for Pain.   Discontinued Medications    EMPAGLIFLOZIN (JARDIANCE) 10 MG TABLET    Take 1 tablet (10 mg total) by mouth once daily.

## 2022-08-12 NOTE — PROGRESS NOTES
[facility]  Colon and Rectal Surgery    [address]  [place], [place]  Tel: [phone]  Fax: [phone]  [email or web address]   MD Wesley Fink MD Brian Kann, MD W. Forrest Johnston, MD Matthew Giglia, MD Jennifer Paruch, MD William Kethman, MD Danielle Kay, MD     Patient name: Odette Hart   YOB: 1955   MRN: [id number]  Date of visit: 08/12/2022    Dear Dr. Barragan,    It was a pleasure seeing Ms. Hart in the Colon and Rectal Surgery clinic here at [facility].     Ms. 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 Epstein) on 5/9/2022, complicated by readmission for management of shortness of breath/HF exacerbation, severe malnutrition, and wound care. She has been discharged and is here for follow-up.    Pathology  A.   APPENDIX, APPENDECTOMY:          -Benign appendix, without diagnostic abnormality.          -Attached portion of benign colon, without diagnostic abnormality.          -Proximal margin, negative for tumor.          -Negative for malignancy.   B.   COLON, SIGMOID AND PORTION OF CECUM, SIGMOID COLECTOMY:          -Portions of benign colon showing diverticulitis with associated mural wall thickening, serosal hemorrhage and adhesions (clinical, colovesical/colovaginal fistula).          -Surgical margins, viable benign colon, without diagnostic abnormality.          -Negative for malignancy.   C.   GALLBLADDER, CHOLECYSTECTOMY:          -Mild chronic cholecystitis.     She is walking on her own more, still spirits are low. She is  developing more of an appetite - but not perfect. She is having only intermittent nausea, no vomiting. She gained 3 lbs. She has been seeing wound care and they are marking some progress on the sacral wound.     8/12/2022  Here today - she was recently readmitted for heart failure management which has been a recurrent issue for her post-operatively (dfificult to manage) - our service followed to assist in wound care. She is going to see our ostomy care nurses today as well. She seems to be doing better - wearing makeup today. She is having seldom nausea, no vomiting. Her wounds do seem to be making progress. She is still titrating cardiac medications.    The patient was informed of the availability of a certified  without charge. A certified  was not necessary for this visit.    Review of Systems  See pertinent review of systems above    Past Medical History:   Diagnosis Date    Acute coronary syndrome     Acute exacerbation of chronic obstructive pulmonary disease (COPD) 3/23/2022    Allergy     Arthritis     Cardiomyopathy     CHF (congestive heart failure)     Coronary artery disease     Coronary artery disease of native artery of native heart with stable angina pectoris 4/19/2021 1/24/2022: [facility]: Cath: LAD: Proximal stent patent. LCX: Proximal 80%. LCX: Dominant. Moderate disease. LCX: HEVER 2.75 x 18 mm. Distal dissection. HEVER 2.75 x 22 mm.     Diverticulitis     Diverticulosis     Familial hypercholesterolemia 1/22/2022    Former smoker 1/24/2022    Heart attack     Heart disease     History of myocardial infarction 1/24/2022    Hyperlipidemia     Hypertension     Hypertension 1/24/2022    ICD (implantable cardioverter-defibrillator) in place     Non-ST elevation myocardial infarction (NSTEMI) 2/4/2019     Past Surgical History:   Procedure Laterality Date    APPENDECTOMY N/A 5/9/2022    Procedure: APPENDECTOMY;  Surgeon: Rafa Cardozo MD;  Location:  [facility] FLR;  Service: Colon and Rectal;  Laterality: N/A;    ATRIAL CARDIAC PACEMAKER INSERTION      CECECTOMY N/A 2022    Procedure: EXCISION, CECUM;  Surgeon: Rafa Cardozo MD;  [facility] FLR;  Service: Colon and Rectal;  Laterality: N/A;     SECTION      CHOLECYSTECTOMY N/A 2022    Procedure: CHOLECYSTECTOMY;  Surgeon: Doug Epstein MD;  Location: [facility] FLR;  Service: General;  Laterality: N/A;    COLONOSCOPY N/A 2022    Procedure: COLONOSCOPY;  Surgeon: Rafa Cardozo MD;  [facility]: [facility] FLR;  Service: Colon and Rectal;  Laterality: N/A;    COLOSTOMY  2022    Procedure: CREATION, COLOSTOMY;  Surgeon: Rafa Cardozo MD;  [facility];  Service: Colon and Rectal;;    CORONARY STENT PLACEMENT      LEFT HEART CATHETERIZATION Left 2022    Procedure: CATHETERIZATION, HEART, LEFT;  Surgeon: Yohannes Cordova MD;  Location: [facility] CATH LAB;  Service: Cardiology;  Laterality: Left;     Family History   Problem Relation Age of Onset    Heart disease Mother     Emphysema Father     Heart attack Brother      Social History     Tobacco Use    Smoking status: Former Smoker     Packs/day: 0.50     Start date: 1976     Quit date: 2012     Years since quittin.6    Smokeless tobacco: Never Used   Substance Use Topics    Alcohol use: No    Drug use: No     Review of patient's allergies indicates:   Allergen Reactions    Augmentin [amoxicillin-pot clavulanate] Swelling     Not allergic to amoxicillin just a derivative of augmentin    Lisinopril Other (See Comments)     Fluid around heart    Losartan Other (See Comments)     High potassium    Shellfish containing products Anaphylaxis     Pt.states she is allergic to SEAFOOD since the age of 13    Levaquin [levofloxacin] Other (See Comments)     Very bad joint pain    Clindamycin Palpitations     Chest pain       Current Outpatient Medications on File Prior to  Visit   Medication Sig Dispense Refill    acetaminophen (TYLENOL) 500 MG tablet Take 2 tablets (1,000 mg total) by mouth every 8 (eight) hours as needed for Pain.  0    albuterol (PROVENTIL/VENTOLIN HFA) 90 mcg/actuation inhaler Inhale 2 puffs into the lungs every 6 (six) hours as needed for Wheezing. Rescue 18 g 1    ALPRAZolam (XANAX) 0.5 MG tablet Take 1 tablet (0.5 mg total) by mouth nightly as needed for Anxiety. 30 tablet 1    aspirin (ECOTRIN) 81 MG EC tablet Take 1 tablet (81 mg total) by mouth once daily. 90 tablet 0    carvediloL (COREG) 3.125 MG tablet Take 1 tablet (3.125 mg total) by mouth 2 (two) times daily. 60 tablet 11    clopidogreL (PLAVIX) 75 mg tablet Take 1 tablet (75 mg total) by mouth once daily. 30 tablet 1    collagenase (SANTYL) ointment Apply topically once daily. 90 g 1    docusate sodium (COLACE) 100 MG capsule Take 1 capsule (100 mg total) by mouth 2 (two) times daily. 60 capsule 1    esomeprazole (NEXIUM) 40 MG capsule Take 1 capsule (40 mg total) by mouth before breakfast. 30 capsule 11    ezetimibe (ZETIA) 10 mg tablet Take 1 tablet (10 mg total) by mouth every evening. 90 tablet 3    fluticasone propionate (FLONASE) 50 mcg/actuation nasal spray 1 spray (50 mcg total) by Each Nostril route once daily. 9.9 mL 1    food supplemt, lactose-reduced (ENSURE HIGH PROTEIN) Liqd Take 240 mLs by mouth 2 (two) times a day. 60 each 3    furosemide (LASIX) 40 MG tablet Take 1 tablet (40 mg total) by mouth once daily. Prn wt gain or shortness of breath 30 tablet 11    HYDROcodone-acetaminophen (NORCO) 5-325 mg per tablet Take 1 tablet by mouth every 12 (twelve) hours as needed for Pain. 14 tablet 0    nitroGLYCERIN (NITROSTAT) 0.4 MG SL tablet Place 1 tablet (0.4 mg total) under the tongue every 5 (five) minutes as needed for Chest pain. 25 tablet 0    ondansetron (ZOFRAN-ODT) 8 MG TbDL Take 1 tablet (8 mg total) by mouth every 12 (twelve) hours as needed (nausea). 20 tablet 2     "polyethylene glycol (GLYCOLAX) 17 gram/dose powder Take 17 g by mouth once daily. 510 g 1    simethicone (MYLICON) 80 MG chewable tablet Take 1 tablet (80 mg total) by mouth every 8 (eight) hours as needed for Flatulence. 120 tablet 5    valsartan (DIOVAN) 40 MG tablet Take 1 tablet (40 mg total) by mouth once daily. 90 tablet 3    [DISCONTINUED] metoprolol succinate (TOPROL-XL) 25 MG 24 hr tablet Take 1 tablet (25 mg total) by mouth once daily. 1 tablet 0    [DISCONTINUED] pantoprazole (PROTONIX) 40 MG tablet Take 1 tablet (40 mg total) by mouth once daily. 30 tablet 1    [DISCONTINUED] prasugreL (EFFIENT) 10 mg Tab Take 1 tablet (10 mg total) by mouth once daily. 90 tablet 0    [DISCONTINUED] spironolactone (ALDACTONE) 25 MG tablet Take 12.5 mg by mouth once daily.       No current facility-administered medications on file prior to visit.     Physical Examination  Ht 5' 3" (1.6 m)   Wt 50.6 kg (111 lb 8 oz)   LMP  (LMP Unknown)   BMI 19.75 kg/m²      A chaperone was present for the physical examination.    Constitutional: well developed, no cough, some SOB - at baseline - but not on oxygen  Head: Normocephalic, no lesions, without obvious abnormality  Eye: Normal external eye, conjunctiva, and lids  Cardiovascular: regular rate and regular rhythm  Respiratory: normal air entry  Gastrointestinal: soft, non-tender, non-distended, wound explored at midline, lower extremities not swollen            Musculoskeletal: full range of motion without pain  Neurologic: alert, oriented, normal speech, no focal findings or movement disorder noted  Psychiatric: appropriate, normal mood    Assessment and Plan of Care    Thank you again for referring Ms. Hart to my care. In summary, Ms. Hart is a 66 year old woman presenting for post-operative follow-up. We discussed treatment options and have provided the following recommendations:    1. Discussed wound care - will ultimately defer to [facility] wound care for management " - would not recommend wound vac placement.  2. Appreciate multi-discipline coordinated care regarding co-morbidities  3. Colonoscopy in 1 year (7/2023)   4. Follow-up in 1 month    Please do not hesitate to contact me if you have any questions.      Rafa Cardozo MD - Staff Surgeon  Department of Colon & Rectal Surgery  [facility]

## 2022-08-12 NOTE — PROGRESS NOTES
This patient is unknown to me and is here in clinic today for first post op evaluation related to colostomy. Surgery done few months  by Dr. Cardozo. The patient reports some problems with  new ostomy. The patient is not receiving home health care now.   Pain level today is reported as  0.    The colostomy is 30 mm gloria medium budded stoma.  The patient is currently  wearing a 1piece pouching system by  [employer].   Average wear time is 2 days.   Peristomal skin is clear    There is no  mucocutaneous separation.  Pt is coping well with the new ostomy.with help of a good friend   SUPPLIES/DME: will send to  N for approval and OHME     Pouching concerns include:    Suggested closed end for ease of use and disposal   Applied REINA and gave a few Rolly closed end to try and then order once PHN gives approval        Wound care being done at [facility] wound center   SPECIAL NEEDS:  Pt counseled on skin care, nutrition, hydration as well as  how to order ostomy supplies.  I spent greater than 50% of this 45 minute visit in face to face counseling.

## 2022-08-15 NOTE — TELEPHONE ENCOUNTER
----- Message from Manju Hassan sent at 8/15/2022  4:22 PM CDT -----  Contact: [phone]  [facility] stated that patient is enroll in case management now, but is not willing to start the medication mirtazapine (REMERON) 15 MG tablet, patient is having pain on both shoulder and wrist, and would like to be refer to ortho. Please call and advise. Thank you

## 2022-08-17 NOTE — TELEPHONE ENCOUNTER
I called pt and reiterated the info regarding getting ostomy supplies, I had explained in our clinic visit  That [facility] would have to be the supplier for [facility] pt which she is  I gave her written info too. She has been trying to get supplies from another place and wondering why there is a hold up ,   I have confirmed with HEATHER that supplies are on the way to her and she said she lost the paper I gave her so I gave her the info and she wrote it down.      Patient presents today for her annual exam.  She had her last pap on 02/21/2017 and it was negative.    Her last menses started 11/26/2019 and she reports her cycles to be regular.     Latex:  Patient denies allergy to latex.  Medications reviewed with patient.  Tobacco use verified.  Allergies verified.    PMD - Dell Prasad MD    Patient would like communication of their results via:      Cell Phone:   Telephone Information:   Mobile [phone]     Okay to leave a message containing results? Yes    Patient's current [facility] status: Inactive - Information given.     Would you like STD screening:  No    Chaperone needed:  No      Depo Provera injection given Patient aware to return to clinic between Feb 24th and March 10th for next injection..  Patient tolerated without incident.  See MAR for documentation.

## 2022-08-26 NOTE — PROGRESS NOTES
IP Liaison - Initial Visit Note    Patient: Odette Hart  MRN:  [id number]  Date of Service:  8/26/2022  Completed by:  RADHA Madrigal    Reason for Visit   Patient presents with    IP Liaison Initial Visit       RSW met with patient at bedside in order to complete SDOH questionnaire and liaison assessment.  Pt has identified no barriers to care.    The following were addressed during this visit:  - Review SDOH Questions   - Complete patient assessment   - Review and discuss options for social groups or events   - Review and discuss options to become more physically active   - Complete initial visit with patient        Patient Summary     IP Liaison Patient Assessment    General  Level of Caregiver support: Caregiver currently provides assistance  Have you had to make a decision between paying for any of the following in the last 2 months?: None  Transportation means: Family  Employment status: Disabled  Current symptoms: Sleep disturbances  Assessments  Was the PHQ Depression Screening completed this visit?: Yes  Was the DANIEL-7 Screening completed this visit?: No         RADHA Madrigal

## 2022-08-26 NOTE — PROGRESS NOTES
Patient seen and examined.  Breathing comfortably on room air, though she reports she is more short of breath than baseline.  +bibasilar rales.    * Acute on chronic systolic heart failure  BNP- 3698, pulmonary edema on CXR; EF 10% on 8/4  Appreciate cardiology consult  Continue 40mg IV Lasix BID  Continue Coreg     Primary hypertension  BP tends to run low  Continue Coreg with hold parameters while diuresing     Familial hypercholesterolemia  Continue Zetia    Hyponatremia  Chronic and sodium at recent baseline of 129.  Monitor    Weakness, debility  PT/OT
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
## 2022-08-26 NOTE — ASSESSMENT & PLAN NOTE
BNP- 3698, troponin-.029; pulmonary edema on CXR    Lasix BID  Continue Coreg  EF 10% on 8/4  Consult Cardiology

## 2022-08-26 NOTE — ED PROVIDER NOTES
"     Source of History:  Patient    Chief complaint:  Shortness of Breath (Pt c/o difficulty breathing with associated weakness and nausea.  Pmh of CHF)      HPI:  Odette Hart is a 66 y.o. female BIBA with PMHx MI, HLD, CAD, cardiomyopathy, HTN, and CHF presenting to the ED for evaluation of shortness of breath onset earlier today. She reports "I've been feeling bad," endorsing associated symptoms of nausea and weakness. She states she has been near vomiting but denies any vomiting episodes. She reports elevated temperature at 99 F last night. She states her current presentations feel similar to her previous hospitalizations for dyspnea. Pt is regularly followed by cardiology. She denies pain elsewhere and other somatic complaints.    This is the extent to the patients complaints today here in the emergency department.    ROS: As per HPI and below:  Constitutional: Notes elevated temperature.  No chills.  Eyes: No visual changes.  ENT: No sore throat. No ear pain    Cardiovascular: No chest pain.  Respiratory: Notes shortness of breath.  GI: No abdominal pain.  No nausea or vomiting.  Genitourinary: No abnormal urination.  Neurologic: No headache. No focal weakness.  No numbness.  MSK: no back pain.  Integument: No rashes or lesions.  Hematologic: No easy bruising.  Endocrine: No excessive thirst or urination.    Review of patient's allergies indicates:   Allergen Reactions    Augmentin [amoxicillin-pot clavulanate] Swelling     Not allergic to amoxicillin just a derivative of augmentin    Lisinopril Other (See Comments)     Fluid around heart    Losartan Other (See Comments)     High potassium    Shellfish containing products Anaphylaxis     Pt.states she is allergic to SEAFOOD since the age of 13    Levaquin [levofloxacin] Other (See Comments)     Very bad joint pain    Clindamycin Palpitations     Chest pain       PMH:  As per HPI and below:  Past Medical History:   Diagnosis Date    Acute coronary " syndrome     Acute exacerbation of chronic obstructive pulmonary disease (COPD) 3/23/2022    Allergy     Arthritis     Cardiomyopathy     CHF (congestive heart failure)     Coronary artery disease     Coronary artery disease of native artery of native heart with stable angina pectoris 2021: [facility]: Cath: LAD: Proximal stent patent. LCX: Proximal 80%. LCX: Dominant. Moderate disease. LCX: HEVER 2.75 x 18 mm. Distal dissection. HEVER 2.75 x 22 mm.     Diverticulitis     Diverticulosis     Familial hypercholesterolemia 2022    Former smoker 2022    Heart attack     Heart disease     History of myocardial infarction 2022    Hyperlipidemia     Hypertension     Hypertension 2022    ICD (implantable cardioverter-defibrillator) in place     Non-ST elevation myocardial infarction (NSTEMI) 2019     Past Surgical History:   Procedure Laterality Date    APPENDECTOMY N/A 2022    Procedure: APPENDECTOMY;  Surgeon: Rafa Cardozo MD;  Location: [facility] OR [facility] FLR;  Service: Colon and Rectal;  Laterality: N/A;    ATRIAL CARDIAC PACEMAKER INSERTION      CECECTOMY N/A 2022    Procedure: EXCISION, CECUM;  Surgeon: Rafa Cardozo MD;  Location: [facility] OR [facility] FLR;  Service: Colon and Rectal;  Laterality: N/A;     SECTION      CHOLECYSTECTOMY N/A 2022    Procedure: CHOLECYSTECTOMY;  Surgeon: Doug Epstein MD;  Location: [facility] OR 2ND FLR;  Service: General;  Laterality: N/A;    COLONOSCOPY N/A 2022    Procedure: COLONOSCOPY;  Surgeon: Rafa Cardozo MD;  Location: [facility] OR 2ND FLR;  Service: Colon and Rectal;  Laterality: N/A;    COLOSTOMY  2022    Procedure: CREATION, COLOSTOMY;  Surgeon: Rafa Cardozo MD;  Location: [facility] OR 2ND FLR;  Service: Colon and Rectal;;    CORONARY STENT PLACEMENT      LEFT HEART CATHETERIZATION Left 2022    Procedure: CATHETERIZATION, HEART, LEFT;  Surgeon: Yohannes Cordova MD;  Location: [facility] CATH  LAB;  Service: Cardiology;  Laterality: Left;       Social History     Tobacco Use    Smoking status: Former Smoker     Packs/day: 0.50     Start date: 1976     Quit date: 2012     Years since quittin.7    Smokeless tobacco: Never Used   Substance Use Topics    Alcohol use: No    Drug use: No       Physical Exam:    BP (!) 108/55   Pulse 75   Temp 98.1 °F (36.7 °C) (Oral)   Resp 20   LMP  (LMP Unknown)   SpO2 99%   Breastfeeding No   Nursing note and vital signs reviewed.  Constitutional: No acute distress.  Nontoxic  Eyes: No conjunctival injection.  Extraocular muscles are intact.  ENT: Oropharynx clear.  Cardiovascular:  Regular rate and rhythm no murmurs gallops or rubs  Chest/ Respiratory:  Bibasilar crackles. Tachypneic.   Abdomen: Colostomy bag in abdomen. Soft.  Not distended.  Nontender.  No guarding.  No rebound. Non-peritoneal.  Musculoskeletal: Good range of motion all joints.  No deformities.  Neck supple.  No meningismus.  Skin: No rashes seen.  Good turgor.  No abrasions.  No ecchymoses.  Neuro: alert and oriented x3,  no focal neurological deficits.  Psych: Appropriate, conversant    Labs that have been ordered have been independently reviewed and interpreted by myself.    I decided to obtain the patient's medical records.  Summary of Medical Records:  2022-2022 hospitalization at [facility].  D-dimer as well as BNP were elevated at that time.  CTA revealed fluid overloaded and acute heart failure.  Managed with IV Lasix.    Also previous hospitalizations in  as well as a May 2022 for dyspnea secondary to heart failure.    2022 echocardiogram shows ejection fraction of 10% with grade 2 left ventricular diastolic dysfunction and severely decreased systolic function.    MDM/ Differential Dx:  66 y.o. female with dyspnea.  Most likely secondary to heart failure.  Also considered but less likely is COVID, pneumonia.  Considered but do not suspect  pulmonary embolus or dissection.  Will get labs including chest x-ray give Lasix and observed.    ED Course as of 08/26/22 0253   Fri Aug 26, 2022   0207 EKG 12-lead  EKG independently interpreted by myself shows normal sinus rhythm at a rate of 70, normal intervals, widened QRS due to conduction block, no ST changes.  Nonspecific T-wave inversions in V2 and V3.  Overall impression no STEMI.  Compared to an EKG on August 18, 2020 shows no significant change. [SM]   0229 WBC(!): 3.54 [SM]   0229 Hemoglobin(!): 9.6 [SM]   0233 Sodium(!): 129  baseline [SM]   0236 X-Ray Chest AP Portable  Chest x-ray independently interpreted by myself shows cardiomegaly, increased pulmonary congestion with some mild cephalization.  No focal consolidation. [SM]   0245 Troponin I(!): 0.029  Baseline for the patient [SM]   0248 BNP(!): 3,698 [SM]   0252 Exam and workup is consistent with CHF.  Will admit to Hospital Medicine for diuresis and observation and management.  Will admit to Dr. Hunt. []      ED Course User Index  [] Roosevelt Saenz DO               Diagnostic Impression:    1. Acute combined systolic and diastolic congestive heart failure    2. Shortness of breath      I, Oneida Sims, scribed for, and in the presence of, Roosevelt Saenz DO. I performed the scribed service and the documentation accurately describes the services I performed. I attest to the accuracy of the note.     I, Dr Roosevelt Saenz, personally performed the services described in this documentation. All medical record entries made by the scribe were at my direction and in my presence. I have reviewed the chart and agree that the record reflects my personal performance and is accurate and complete.   ED Disposition Condition    Observation                 Roosevelt Saenz DO  08/26/22 0253

## 2022-08-26 NOTE — PLAN OF CARE
Initial Discharge Planning Assessment:  Patient admitted on: 8/26/22     Chart reviewed, Care plan discussed with treatment team,  attending Dr. Hunt      PCP updated in Epic: Dr. Kat   Pharmacy, updated in Epic: [facility]      DME at home: Rolling walker       Current dispo: Home with family vs Home health       Transportation: Family can provide      Power of  or Living Will: Family      Anticipated DC needs from  perspective:             08/26/22 1320   Discharge Assessment   Assessment Type Discharge Planning Assessment   Confirmed/corrected address, phone number and insurance Yes   Confirmed Demographics Correct on Facesheet   Source of Information patient;health record   Lives With alone   Do you expect to return to your current living situation? Yes   Do you have help at home or someone to help you manage your care at home? Yes   Who are your caregiver(s) and their phone number(s)? Family   Prior to hospitilization cognitive status: Alert/Oriented   Current cognitive status: Alert/Oriented   Walking or Climbing Stairs Difficulty none   Dressing/Bathing Difficulty none   Equipment Currently Used at Home none   Readmission within 30 days? Yes   Patient currently being followed by outpatient case management? No   Do you currently have service(s) that help you manage your care at home? No   Do you take prescription medications? Yes   Do you have prescription coverage? Yes   Do you have any problems affording any of your prescribed medications? No   Is the patient taking medications as prescribed? yes   How do you get to doctors appointments? family or friend will provide;health plan transportation   Are you on dialysis? No   Do you take coumadin? No   Discharge Plan A Home with family   Discharge Plan B Home Health   DME Needed Upon Discharge  none   Discharge Plan discussed with: Patient   Discharge Barriers Identified None

## 2022-08-26 NOTE — PLAN OF CARE
Problem: Impaired Wound Healing  Goal: Optimal Wound Healing  Outcome: Ongoing, Progressing     Problem: Skin Injury Risk Increased  Goal: Skin Health and Integrity  Outcome: Ongoing, Progressing     Problem: Fall Injury Risk  Goal: Absence of Fall and Fall-Related Injury  Outcome: Ongoing, Progressing     Problem: Adult Inpatient Plan of Care  Goal: Plan of Care Review  Outcome: Ongoing, Progressing  Goal: Patient-Specific Goal (Individualized)  Outcome: Ongoing, Progressing  Goal: Absence of Hospital-Acquired Illness or Injury  Outcome: Ongoing, Progressing  Goal: Optimal Comfort and Wellbeing  Outcome: Ongoing, Progressing  Goal: Readiness for Transition of Care  Outcome: Ongoing, Progressing

## 2022-08-26 NOTE — HPI
"Per Michael Long, WINNIE:  "The patient is a 66 y.o. female with a past medical history of MI, HLD, CAD, cardiomyopathy, HTN, and CHF who presents for evaluation of shortness of breath onset earlier today. She reports "I've been feeling bad," endorsing associated symptoms of nausea and weakness. She states she has been near vomiting but denies any vomiting episodes. She reports elevated temperature at 99 F last night. She states her current presentations feel similar to her previous hospitalizations for dyspnea. Pt is regularly followed by cardiology. She denies pain elsewhere and other somatic complaints.  BNP elevated to greater than 3000 with pulmonary edema noted on CXR.  She will be admitted for further management of her acute on chronic systolic congestive heart failure."  "

## 2022-08-26 NOTE — H&P
"[facility] Medicine  History & Physical    Patient Name: Odette Hart  MRN: [id number]  Patient Class: OP- Observation  Admission Date: 8/26/2022  Attending Physician: Nghia Hunt MD   Primary Care Provider: Braxton Barragan MD         Patient information was obtained from patient, past medical records and ER records.     Subjective:     Principal Problem:Acute on chronic systolic heart failure    Chief Complaint:   Chief Complaint   Patient presents with    Shortness of Breath     Pt c/o difficulty breathing with associated weakness and nausea.  Pmh of CHF        HPI: The patient is a 66 y.o. female with a past medical history of MI, HLD, CAD, cardiomyopathy, HTN, and CHF who presents for evaluation of shortness of breath onset earlier today. She reports "I've been feeling bad," endorsing associated symptoms of nausea and weakness. She states she has been near vomiting but denies any vomiting episodes. She reports elevated temperature at 99 F last night. She states her current presentations feel similar to her previous hospitalizations for dyspnea. Pt is regularly followed by cardiology. She denies pain elsewhere and other somatic complaints.  BNP elevated to greater than 3000 with pulmonary edema noted on CXR.  She will be admitted for further management of her acute on chronic systolic congestive heart failure.      Past Medical History:   Diagnosis Date    Acute coronary syndrome     Acute exacerbation of chronic obstructive pulmonary disease (COPD) 3/23/2022    Allergy     Arthritis     Cardiomyopathy     CHF (congestive heart failure)     Coronary artery disease     Coronary artery disease of native artery of native heart with stable angina pectoris 4/19/2021 1/24/2022: [facility]: Cath: LAD: Proximal stent patent. LCX: Proximal 80%. LCX: Dominant. Moderate disease. LCX: HEVER 2.75 x 18 mm. Distal dissection. HEVER 2.75 x 22 mm.     Diverticulitis     Diverticulosis     " Familial hypercholesterolemia 2022    Former smoker 2022    Heart attack     Heart disease     History of myocardial infarction 2022    Hyperlipidemia     Hypertension     Hypertension 2022    ICD (implantable cardioverter-defibrillator) in place     Non-ST elevation myocardial infarction (NSTEMI) 2019       Past Surgical History:   Procedure Laterality Date    APPENDECTOMY N/A 2022    Procedure: APPENDECTOMY;  Surgeon: Rafa Cardozo MD;  [facility] OR 2ND FLR;  Service: Colon and Rectal;  Laterality: N/A;    ATRIAL CARDIAC PACEMAKER INSERTION      CECECTOMY N/A 2022    Procedure: EXCISION, CECUM;  Surgeon: Rafa Cardozo MD;  Location: [facility] OR [facility] FLR;  Service: Colon and Rectal;  Laterality: N/A;     SECTION      CHOLECYSTECTOMY N/A 2022    Procedure: CHOLECYSTECTOMY;  Surgeon: Doug Epstein MD;  Location: [facility] OR [facility] FLR;  Service: General;  Laterality: N/A;    COLONOSCOPY N/A 2022    Procedure: COLONOSCOPY;  Surgeon: Rafa Cardozo MD;  Location: [facility] OR 2ND FLR;  Service: Colon and Rectal;  Laterality: N/A;    COLOSTOMY  2022    Procedure: CREATION, COLOSTOMY;  Surgeon: Rafa Cardozo MD;  Location: [facility] OR 2ND FLR;  Service: Colon and Rectal;;    CORONARY STENT PLACEMENT      LEFT HEART CATHETERIZATION Left 2022    Procedure: CATHETERIZATION, HEART, LEFT;  Surgeon: Yohannes Cordova MD;  Location: [facility] CATH LAB;  Service: Cardiology;  Laterality: Left;       Review of patient's allergies indicates:   Allergen Reactions    Augmentin [amoxicillin-pot clavulanate] Swelling     Not allergic to amoxicillin just a derivative of augmentin    Lisinopril Other (See Comments)     Fluid around heart    Losartan Other (See Comments)     High potassium    Shellfish containing products Anaphylaxis     Pt.states she is allergic to SEAFOOD since the age of 13    Levaquin [levofloxacin] Other (See Comments)     Very bad  joint pain    Clindamycin Palpitations     Chest pain       No current facility-administered medications on file prior to encounter.     Current Outpatient Medications on File Prior to Encounter   Medication Sig    acetaminophen (TYLENOL) 500 MG tablet Take 2 tablets (1,000 mg total) by mouth every 8 (eight) hours as needed for Pain.    albuterol (PROVENTIL/VENTOLIN HFA) 90 mcg/actuation inhaler Inhale 2 puffs into the lungs every 6 (six) hours as needed for Wheezing. Rescue    ALPRAZolam (XANAX) 0.5 MG tablet Take 1 tablet (0.5 mg total) by mouth nightly as needed for Anxiety.    aspirin (ECOTRIN) 81 MG EC tablet Take 1 tablet (81 mg total) by mouth once daily.    carvediloL (COREG) 3.125 MG tablet Take 1 tablet (3.125 mg total) by mouth 2 (two) times daily.    clopidogreL (PLAVIX) 75 mg tablet Take 1 tablet (75 mg total) by mouth once daily.    collagenase (SANTYL) ointment Apply topically once daily.    docusate sodium (COLACE) 100 MG capsule Take 1 capsule (100 mg total) by mouth 2 (two) times daily.    esomeprazole (NEXIUM) 40 MG capsule Take 1 capsule (40 mg total) by mouth before breakfast.    ezetimibe (ZETIA) 10 mg tablet Take 1 tablet (10 mg total) by mouth every evening.    fluticasone propionate (FLONASE) 50 mcg/actuation nasal spray 1 spray (50 mcg total) by Each Nostril route once daily.    food supplemt, lactose-reduced (ENSURE HIGH PROTEIN) Liqd Take 240 mLs by mouth 2 (two) times a day.    furosemide (LASIX) 40 MG tablet Take 1 tablet (40 mg total) by mouth once daily. Prn wt gain or shortness of breath    HYDROcodone-acetaminophen (NORCO) 5-325 mg per tablet Take 1 tablet by mouth every 12 (twelve) hours as needed for Pain.    mirtazapine (REMERON) 15 MG tablet Take 1 tablet (15 mg total) by mouth every evening.    nitroGLYCERIN (NITROSTAT) 0.4 MG SL tablet Place 1 tablet (0.4 mg total) under the tongue every 5 (five) minutes as needed for Chest pain.    ondansetron (ZOFRAN-ODT) 8  MG TbDL Take 1 tablet (8 mg total) by mouth every 12 (twelve) hours as needed (nausea).    polyethylene glycol (GLYCOLAX) 17 gram/dose powder Take 17 g by mouth once daily.    promethazine (PHENERGAN) 25 MG tablet Take 1 tablet (25 mg total) by mouth every 6 (six) hours as needed for Nausea.    simethicone (MYLICON) 80 MG chewable tablet Take 1 tablet (80 mg total) by mouth every 8 (eight) hours as needed for Flatulence.    [] sulfamethoxazole-trimethoprim 800-160mg (BACTRIM DS) 800-160 mg Tab Take 1 tablet by mouth 2 (two) times daily. for 7 days    traMADoL (ULTRAM) 50 mg tablet Take 1 tablet (50 mg total) by mouth every 6 (six) hours as needed for Pain.    valsartan (DIOVAN) 40 MG tablet Take 1 tablet (40 mg total) by mouth once daily.    [DISCONTINUED] metoprolol succinate (TOPROL-XL) 25 MG 24 hr tablet Take 1 tablet (25 mg total) by mouth once daily.    [DISCONTINUED] pantoprazole (PROTONIX) 40 MG tablet Take 1 tablet (40 mg total) by mouth once daily.    [DISCONTINUED] prasugreL (EFFIENT) 10 mg Tab Take 1 tablet (10 mg total) by mouth once daily.    [DISCONTINUED] spironolactone (ALDACTONE) 25 MG tablet Take 12.5 mg by mouth once daily.     Family History       Problem Relation (Age of Onset)    Emphysema Father    Heart attack Brother    Heart disease Mother          Tobacco Use    Smoking status: Former Smoker     Packs/day: 0.50     Start date: 1976     Quit date: 2012     Years since quittin.7    Smokeless tobacco: Never Used   Substance and Sexual Activity    Alcohol use: No    Drug use: No    Sexual activity: Not on file     Review of Systems   Constitutional:  Positive for activity change, appetite change and fatigue. Negative for fever.   HENT:  Negative for congestion, ear pain, rhinorrhea and sinus pressure.    Eyes:  Negative for pain and discharge.   Respiratory:  Positive for shortness of breath. Negative for cough, chest tightness and wheezing.     Cardiovascular:  Positive for leg swelling. Negative for chest pain.   Gastrointestinal:  Negative for abdominal distention, abdominal pain, diarrhea, nausea and vomiting.   Endocrine: Negative for cold intolerance and heat intolerance.   Genitourinary:  Negative for difficulty urinating, flank pain, frequency, hematuria and urgency.   Musculoskeletal:  Positive for myalgias. Negative for arthralgias and joint swelling.   Allergic/Immunologic: Negative for environmental allergies and food allergies.   Neurological:  Negative for dizziness, weakness, light-headedness and headaches.   Hematological:  Does not bruise/bleed easily.   Psychiatric/Behavioral:  Negative for agitation, behavioral problems and decreased concentration.    Objective:     Vital Signs (Most Recent):  Temp: 98.1 °F (36.7 °C) (08/26/22 0138)  Pulse: 69 (08/26/22 0330)  Resp: 17 (08/26/22 0330)  BP: (!) 100/59 (08/26/22 0330)  SpO2: 95 % (08/26/22 0330)   Vital Signs (24h Range):  Temp:  [98.1 °F (36.7 °C)] 98.1 °F (36.7 °C)  Pulse:  [69-75] 69  Resp:  [17-30] 17  SpO2:  [95 %-99 %] 95 %  BP: (100-111)/(53-59) 100/59        There is no height or weight on file to calculate BMI.    Physical Exam  Constitutional:       Appearance: She is well-developed. She is ill-appearing.   HENT:      Head: Normocephalic.   Eyes:      General:         Right eye: No discharge.         Left eye: No discharge.      Conjunctiva/sclera: Conjunctivae normal.   Cardiovascular:      Rate and Rhythm: Regular rhythm. Tachycardia present.      Pulses:           Radial pulses are 1+ on the right side and 1+ on the left side.      Heart sounds: Normal heart sounds.   Pulmonary:      Effort: Pulmonary effort is normal. Tachypnea present. No respiratory distress.      Breath sounds: Examination of the right-lower field reveals rales. Examination of the left-lower field reveals rales. Rales present.   Abdominal:      General: Bowel sounds are decreased. There is no distension.       Palpations: Abdomen is soft.      Tenderness: There is no abdominal tenderness.   Musculoskeletal:         General: Normal range of motion.      Cervical back: Normal range of motion and neck supple.   Skin:     General: Skin is warm and dry.      Coloration: Skin is pale.      Comments: Sacral decubitus noted   Neurological:      Mental Status: She is alert and oriented to person, place, and time.      GCS: GCS eye subscore is 4. GCS verbal subscore is 5. GCS motor subscore is 6.      Motor: Motor function is intact.   Psychiatric:         Mood and Affect: Mood is anxious and depressed.         Speech: Speech normal.         Behavior: Behavior normal.           Significant Labs: All pertinent labs within the past 24 hours have been reviewed.  CBC:   Recent Labs   Lab 08/26/22 0202   WBC 3.54*   HGB 9.6*   HCT 30.0*        CMP:   Recent Labs   Lab 08/26/22 0202   *   K 4.7   CL 98   CO2 21*      BUN 23   CREATININE 0.9   CALCIUM 8.2*   ANIONGAP 10       Significant Imaging: I have reviewed all pertinent imaging results/findings within the past 24 hours.    Assessment/Plan:     * Acute on chronic systolic heart failure  BNP- 3698, troponin-.029; pulmonary edema on CXR    Lasix BID  Continue Coreg  EF 10% on 8/4  Consult Cardiology      Primary hypertension  Normotensive currently    Continue Coreg, lasix      Familial hypercholesterolemia  Continue Zetia        VTE Risk Mitigation (From admission, onward)         Ordered     enoxaparin injection 40 mg  Daily         08/26/22 0325     IP VTE HIGH RISK PATIENT  Once         08/26/22 0325     Place sequential compression device  Until discontinued         08/26/22 0325                   Michael Long NP  Department of Hospital Medicine   [facility])

## 2022-08-26 NOTE — PROGRESS NOTES
[facility] ([place])  Wound Care    Patient Name:  Odette Hart   MRN:  [id number]  Date: 2022  Diagnosis: Acute on chronic systolic heart failure    History:     Past Medical History:   Diagnosis Date    Acute coronary syndrome     Acute exacerbation of chronic obstructive pulmonary disease (COPD) 3/23/2022    Allergy     Arthritis     Cardiomyopathy     CHF (congestive heart failure)     Coronary artery disease     Coronary artery disease of native artery of native heart with stable angina pectoris 2021: [facility]: Cath: LAD: Proximal stent patent. LCX: Proximal 80%. LCX: Dominant. Moderate disease. LCX: HEVER 2.75 x 18 mm. Distal dissection. HEVER 2.75 x 22 mm.     Diverticulitis     Diverticulosis     Familial hypercholesterolemia 2022    Former smoker 2022    Heart attack     Heart disease     History of myocardial infarction 2022    Hyperlipidemia     Hypertension     Hypertension 2022    ICD (implantable cardioverter-defibrillator) in place     Non-ST elevation myocardial infarction (NSTEMI) 2019       Social History     Socioeconomic History    Marital status:    Tobacco Use    Smoking status: Former Smoker     Packs/day: 0.50     Start date: 1976     Quit date: 2012     Years since quittin.7    Smokeless tobacco: Never Used   Substance and Sexual Activity    Alcohol use: No    Drug use: No     Social Determinants of Health     Financial Resource Strain: Low Risk     Difficulty of Paying Living Expenses: Not hard at all   Food Insecurity: No Food Insecurity    Worried About Running Out of Food in the Last Year: Never true    Ran Out of Food in the Last Year: Never true   Transportation Needs: No Transportation Needs    Lack of Transportation (Medical): No    Lack of Transportation (Non-Medical): No   Physical Activity: Inactive    Days of Exercise per Week: 0 days    Minutes of Exercise per Session: 0 min  "  Stress: No Stress Concern Present    Feeling of Stress : Not at all   Social Connections: Socially Isolated    Frequency of Communication with Friends and Family: More than three times a week    Frequency of Social Gatherings with Friends and Family: Once a week    Attends Adventism Services: Never    Active Member of Clubs or Organizations: No    Attends Club or Organization Meetings: Never    Marital Status:    Housing Stability: Low Risk     Unable to Pay for Housing in the Last Year: No    Number of Places Lived in the Last Year: 1    Unstable Housing in the Last Year: No       Precautions:     Allergies as of 08/26/2022 - Reviewed 08/26/2022   Allergen Reaction Noted    Augmentin [amoxicillin-pot clavulanate] Swelling 02/19/2018    Lisinopril Other (See Comments) 02/19/2018    Losartan Other (See Comments) 02/19/2018    Shellfish containing products Anaphylaxis 09/10/2018    Levaquin [levofloxacin] Other (See Comments) 09/18/2018    Clindamycin Palpitations 01/21/2019       [facility] Assessment Details/Treatment   66 y.o. female with a past medical history of MI, HLD, CAD, cardiomyopathy, HTN, and CHF who presents for evaluation of shortness of breath onset earlier today. She reports "I've been feeling bad," endorsing associated symptoms of nausea and weakness.   Wound care consulted.  Chronic Non healing midline abdominal wound , pink , clean and still undermines at 3 and 6 o'clock . Wound requires packing. Note clear suture present in wound base.  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   Base is clean red, and smaller than last seen pt aware of protein malnutrition and offloads and repositions in bed when appropriate. Complains of chronic pain associated with sacral wound when sitting. Will request a waffle cushion for chair seating.   Colostomy pouch is intact . Ostomy appliance is closed end and opaque. Unable to visualize te stoma.   Nutrition remains a concern with poor " wound healing. CAD and heart disease noted. Pt expressed her sadness and reports her heart disease is getting worse. States she just wants to see her youngest son in [place].     08/26/22 1030        Colostomy 05/09/22 1100 Descending/sigmoid LLQ   Placement Date/Time: 05/09/22 1100   Inserted by: MD  Colostomy Type: Descending/sigmoid  Location: LLQ   Stomal Appliance 1 piece;Clean;Dry;Intact;No Leakage   Stoma Appearance other (see comments)  (in opaque applience)   Site Assessment KEO        Incision/Site 05/09/22 1135 Abdomen   Date First Assessed/Time First Assessed: 05/09/22 1135   Present Prior to Hospital Arrival?: Yes  Location: Abdomen   Wound Image    Incision WDL ex   Dressing Appearance Moist drainage;Intact   Drainage Amount Small   Drainage Characteristics/Odor Tan;No odor   Appearance Red;Pink;Moist  (clear suture in wound base)   Red (%), Wound Tissue Color 100 %   Wound Edges Rolled/closed   Wound Length (cm) 1.5 cm   Wound Width (cm) 1.5 cm   Wound Depth (cm) 1.1 cm   Wound Volume (cm^3) 2.475 cm^3   Wound Surface Area (cm^2) 2.25 cm^2   Undermining (depth (cm)/location) 1.5cm at 3 o'clock and 2cm at 6 o'clock   Care Cleansed with:;Wound cleanser   Dressing Applied;Silver;Hydrofiber;Foam   Packing packed with;hydrofiber/alginate        Altered Skin Integrity 05/09/22 1630 Sacral spine Full thickness tissue loss. Base is covered by slough and/or eschar in the wound bed   Date First Assessed/Time First Assessed: 05/09/22 1630   Altered Skin Integrity Present on Admission: yes  Location: Sacral spine  Description of Altered Skin Integrity: Full thickness tissue loss. Base is covered by slough and/or eschar in the wound bed   Wound Image    Description of Altered Skin Integrity Full thickness tissue loss. Subcutaneous fat may be visible but bone, tendon or muscle are not exposed   Dressing Appearance Intact   Drainage Amount Small   Drainage Characteristics/Odor Serous;No odor   Appearance  Pink;Moist;Smooth   Tissue loss description Full thickness   Red (%), Wound Tissue Color 100 %  (pink)   Periwound Area Intact   Wound Edges Open   Wound Length (cm) 0.9 cm   Wound Width (cm) 0.9 cm   Wound Depth (cm) 0.9 cm   Wound Volume (cm^3) 0.729 cm^3   Wound Surface Area (cm^2) 0.81 cm^2   Undermining (depth (cm)/location) 0.8 @ 12 o'clock   Care Cleansed with:;Wound cleanser   Dressing Applied;Silver;Hydrofiber;Silicone;Foam   Packing packed with;hydrofiber/alginate     08/26/2022

## 2022-08-26 NOTE — PLAN OF CARE
POC reviewed. No significant events this shift. Cardiac monitor maintained. 2LPM O2 used per pt when she thinks she needs it. No complaints of pain. Complaints of nausea treated with PRN antiemetic per MAR. Up to C with assist but sometimes bladder incontinent.  Bed low and locked, side rails up x3, call light within reach.    Problem: Adult Inpatient Plan of Care  Goal: Plan of Care Review  Outcome: Ongoing, Progressing  Goal: Patient-Specific Goal (Individualized)  Outcome: Ongoing, Progressing  Goal: Absence of Hospital-Acquired Illness or Injury  Outcome: Ongoing, Progressing  Goal: Optimal Comfort and Wellbeing  Outcome: Ongoing, Progressing  Goal: Readiness for Transition of Care  Outcome: Ongoing, Progressing     Problem: Impaired Wound Healing  Goal: Optimal Wound Healing  Outcome: Ongoing, Progressing     Problem: Skin Injury Risk Increased  Goal: Skin Health and Integrity  Outcome: Ongoing, Progressing     Problem: Fall Injury Risk  Goal: Absence of Fall and Fall-Related Injury  Outcome: Ongoing, Progressing

## 2022-08-26 NOTE — NURSING
End of Shift note:    VSS. AAOx4. UA to be collected. Telemetry in place. Pt on 1.5 L fluid restriction. Wound noted to patient sacral area and abdomen from recent surgery in May.   No PRNS given with relief noted. Pt rested throughout the shift. Will continue to monitor until oncoming nurse arrives.    Vitals:    08/26/22 0600   BP:    Pulse: 73   Resp:    Temp:         Intake/Output Summary (Last 24 hours) at 8/26/2022 0645  Last data filed at 8/26/2022 0533  Gross per 24 hour   Intake --   Output 1150 ml   Net -1150 ml

## 2022-08-26 NOTE — PT/OT/SLP PROGRESS
"Physical Therapy      Patient Name:  Odette Hart   MRN:  [id number]    Patient not seen today secondary to Patient fatigue, Patient ill (reporting SOB). Pt encouraged to sit edge of bed, however, reporting "I came here to get the fluid out of my lungs, not take a walk." PT verbalized understanding.     Will follow-up as schedule allows and as pt is available / appropriate for therapy services.        "

## 2022-08-26 NOTE — SUBJECTIVE & OBJECTIVE
Past Medical History:   Diagnosis Date    Acute coronary syndrome     Acute exacerbation of chronic obstructive pulmonary disease (COPD) 3/23/2022    Allergy     Arthritis     Cardiomyopathy     CHF (congestive heart failure)     Coronary artery disease     Coronary artery disease of native artery of native heart with stable angina pectoris 2021: [facility]: Cath: LAD: Proximal stent patent. LCX: Proximal 80%. LCX: Dominant. Moderate disease. LCX: HEVER 2.75 x 18 mm. Distal dissection. HEVER 2.75 x 22 mm.     Diverticulitis     Diverticulosis     Familial hypercholesterolemia 2022    Former smoker 2022    Heart attack     Heart disease     History of myocardial infarction 2022    Hyperlipidemia     Hypertension     Hypertension 2022    ICD (implantable cardioverter-defibrillator) in place     Non-ST elevation myocardial infarction (NSTEMI) 2019       Past Surgical History:   Procedure Laterality Date    APPENDECTOMY N/A 2022    Procedure: APPENDECTOMY;  Surgeon: Rafa Cardozo MD;  Location: [facility];  Service: Colon and Rectal;  Laterality: N/A;    ATRIAL CARDIAC PACEMAKER INSERTION      CECECTOMY N/A 2022    Procedure: EXCISION, CECUM;  Surgeon: Rafa Cardozo MD;  Location: [facility];  Service: Colon and Rectal;  Laterality: N/A;     SECTION      CHOLECYSTECTOMY N/A 2022    Procedure: CHOLECYSTECTOMY;  Surgeon: Duog Epstein MD;  Location: [facility];  Service: General;  Laterality: N/A;    COLONOSCOPY N/A 2022    Procedure: COLONOSCOPY;  Surgeon: Rafa Cardozo MD;  Location: [facility];  Service: Colon and Rectal;  Laterality: N/A;    COLOSTOMY  2022    Procedure: CREATION, COLOSTOMY;  Surgeon: Rafa Cardozo MD;  Location: [facility];  Service: Colon and Rectal;;    CORONARY STENT PLACEMENT      LEFT HEART CATHETERIZATION Left 2022    Procedure: CATHETERIZATION, HEART, LEFT;  Surgeon: Yohannes  MD Tasha;  Location: [facility] CATH LAB;  Service: Cardiology;  Laterality: Left;       Review of patient's allergies indicates:   Allergen Reactions    Augmentin [amoxicillin-pot clavulanate] Swelling     Not allergic to amoxicillin just a derivative of augmentin    Lisinopril Other (See Comments)     Fluid around heart    Losartan Other (See Comments)     High potassium    Shellfish containing products Anaphylaxis     Pt.states she is allergic to SEAFOOD since the age of 13    Levaquin [levofloxacin] Other (See Comments)     Very bad joint pain    Clindamycin Palpitations     Chest pain       No current facility-administered medications on file prior to encounter.     Current Outpatient Medications on File Prior to Encounter   Medication Sig    acetaminophen (TYLENOL) 500 MG tablet Take 2 tablets (1,000 mg total) by mouth every 8 (eight) hours as needed for Pain.    albuterol (PROVENTIL/VENTOLIN HFA) 90 mcg/actuation inhaler Inhale 2 puffs into the lungs every 6 (six) hours as needed for Wheezing. Rescue    ALPRAZolam (XANAX) 0.5 MG tablet Take 1 tablet (0.5 mg total) by mouth nightly as needed for Anxiety.    aspirin (ECOTRIN) 81 MG EC tablet Take 1 tablet (81 mg total) by mouth once daily.    carvediloL (COREG) 3.125 MG tablet Take 1 tablet (3.125 mg total) by mouth 2 (two) times daily.    clopidogreL (PLAVIX) 75 mg tablet Take 1 tablet (75 mg total) by mouth once daily.    collagenase (SANTYL) ointment Apply topically once daily.    docusate sodium (COLACE) 100 MG capsule Take 1 capsule (100 mg total) by mouth 2 (two) times daily.    esomeprazole (NEXIUM) 40 MG capsule Take 1 capsule (40 mg total) by mouth before breakfast.    ezetimibe (ZETIA) 10 mg tablet Take 1 tablet (10 mg total) by mouth every evening.    fluticasone propionate (FLONASE) 50 mcg/actuation nasal spray 1 spray (50 mcg total) by Each Nostril route once daily.    food supplemt, lactose-reduced (ENSURE HIGH PROTEIN) Liqd Take 240 mLs by mouth 2  (two) times a day.    furosemide (LASIX) 40 MG tablet Take 1 tablet (40 mg total) by mouth once daily. Prn wt gain or shortness of breath    HYDROcodone-acetaminophen (NORCO) 5-325 mg per tablet Take 1 tablet by mouth every 12 (twelve) hours as needed for Pain.    mirtazapine (REMERON) 15 MG tablet Take 1 tablet (15 mg total) by mouth every evening.    nitroGLYCERIN (NITROSTAT) 0.4 MG SL tablet Place 1 tablet (0.4 mg total) under the tongue every 5 (five) minutes as needed for Chest pain.    ondansetron (ZOFRAN-ODT) 8 MG TbDL Take 1 tablet (8 mg total) by mouth every 12 (twelve) hours as needed (nausea).    polyethylene glycol (GLYCOLAX) 17 gram/dose powder Take 17 g by mouth once daily.    promethazine (PHENERGAN) 25 MG tablet Take 1 tablet (25 mg total) by mouth every 6 (six) hours as needed for Nausea.    simethicone (MYLICON) 80 MG chewable tablet Take 1 tablet (80 mg total) by mouth every 8 (eight) hours as needed for Flatulence.    [] sulfamethoxazole-trimethoprim 800-160mg (BACTRIM DS) 800-160 mg Tab Take 1 tablet by mouth 2 (two) times daily. for 7 days    traMADoL (ULTRAM) 50 mg tablet Take 1 tablet (50 mg total) by mouth every 6 (six) hours as needed for Pain.    valsartan (DIOVAN) 40 MG tablet Take 1 tablet (40 mg total) by mouth once daily.    [DISCONTINUED] metoprolol succinate (TOPROL-XL) 25 MG 24 hr tablet Take 1 tablet (25 mg total) by mouth once daily.    [DISCONTINUED] pantoprazole (PROTONIX) 40 MG tablet Take 1 tablet (40 mg total) by mouth once daily.    [DISCONTINUED] prasugreL (EFFIENT) 10 mg Tab Take 1 tablet (10 mg total) by mouth once daily.    [DISCONTINUED] spironolactone (ALDACTONE) 25 MG tablet Take 12.5 mg by mouth once daily.     Family History       Problem Relation (Age of Onset)    Emphysema Father    Heart attack Brother    Heart disease Mother          Tobacco Use    Smoking status: Former Smoker     Packs/day: 0.50     Start date: 1976     Quit date: 2012      Years since quittin.7    Smokeless tobacco: Never Used   Substance and Sexual Activity    Alcohol use: No    Drug use: No    Sexual activity: Not on file     Review of Systems   Constitutional:  Positive for activity change, appetite change and fatigue. Negative for fever.   HENT:  Negative for congestion, ear pain, rhinorrhea and sinus pressure.    Eyes:  Negative for pain and discharge.   Respiratory:  Positive for shortness of breath. Negative for cough, chest tightness and wheezing.    Cardiovascular:  Positive for leg swelling. Negative for chest pain.   Gastrointestinal:  Negative for abdominal distention, abdominal pain, diarrhea, nausea and vomiting.   Endocrine: Negative for cold intolerance and heat intolerance.   Genitourinary:  Negative for difficulty urinating, flank pain, frequency, hematuria and urgency.   Musculoskeletal:  Positive for myalgias. Negative for arthralgias and joint swelling.   Allergic/Immunologic: Negative for environmental allergies and food allergies.   Neurological:  Negative for dizziness, weakness, light-headedness and headaches.   Hematological:  Does not bruise/bleed easily.   Psychiatric/Behavioral:  Negative for agitation, behavioral problems and decreased concentration.    Objective:     Vital Signs (Most Recent):  Temp: 98.1 °F (36.7 °C) (22 0138)  Pulse: 69 (22 0330)  Resp: 17 (22 0330)  BP: (!) 100/59 (22 033)  SpO2: 95 % (22 033)   Vital Signs (24h Range):  Temp:  [98.1 °F (36.7 °C)] 98.1 °F (36.7 °C)  Pulse:  [69-75] 69  Resp:  [17-30] 17  SpO2:  [95 %-99 %] 95 %  BP: (100-111)/(53-59) 100/59        There is no height or weight on file to calculate BMI.    Physical Exam  Constitutional:       Appearance: She is well-developed. She is ill-appearing.   HENT:      Head: Normocephalic.   Eyes:      General:         Right eye: No discharge.         Left eye: No discharge.      Conjunctiva/sclera: Conjunctivae normal.   Cardiovascular:       Rate and Rhythm: Regular rhythm. Tachycardia present.      Pulses:           Radial pulses are 1+ on the right side and 1+ on the left side.      Heart sounds: Normal heart sounds.   Pulmonary:      Effort: Pulmonary effort is normal. Tachypnea present. No respiratory distress.      Breath sounds: Examination of the right-lower field reveals rales. Examination of the left-lower field reveals rales. Rales present.   Abdominal:      General: Bowel sounds are decreased. There is no distension.      Palpations: Abdomen is soft.      Tenderness: There is no abdominal tenderness.   Musculoskeletal:         General: Normal range of motion.      Cervical back: Normal range of motion and neck supple.   Skin:     General: Skin is warm and dry.      Coloration: Skin is pale.      Comments: Sacral decubitus noted   Neurological:      Mental Status: She is alert and oriented to person, place, and time.      GCS: GCS eye subscore is 4. GCS verbal subscore is 5. GCS motor subscore is 6.      Motor: Motor function is intact.   Psychiatric:         Mood and Affect: Mood is anxious and depressed.         Speech: Speech normal.         Behavior: Behavior normal.           Significant Labs: All pertinent labs within the past 24 hours have been reviewed.  CBC:   Recent Labs   Lab 08/26/22  0202   WBC 3.54*   HGB 9.6*   HCT 30.0*        CMP:   Recent Labs   Lab 08/26/22  0202   *   K 4.7   CL 98   CO2 21*      BUN 23   CREATININE 0.9   CALCIUM 8.2*   ANIONGAP 10       Significant Imaging: I have reviewed all pertinent imaging results/findings within the past 24 hours.

## 2022-08-27 NOTE — SUBJECTIVE & OBJECTIVE
Interval History:  Feeling slightly better today.  Breathing is improved but still feels short of breath.  Complains of feeling stiff from lying in bed, but refused physical therapy this morning.    Review of Systems   Constitutional: Negative.  Negative for appetite change, chills and fever.   Respiratory:  Positive for shortness of breath. Negative for cough and wheezing.    Cardiovascular:  Negative for chest pain, palpitations and leg swelling.   Gastrointestinal:  Negative for abdominal pain, constipation, diarrhea, nausea and vomiting.   Genitourinary:  Negative for difficulty urinating.   Objective:     Vital Signs (Most Recent):  Temp: 98.1 °F (36.7 °C) (08/27/22 1126)  Pulse: 67 (08/27/22 1126)  Resp: 16 (08/27/22 1126)  BP: (!) 91/45 (08/27/22 1126)  SpO2: 97 % (08/27/22 1126)   Vital Signs (24h Range):  Temp:  [98.1 °F (36.7 °C)-99 °F (37.2 °C)] 98.1 °F (36.7 °C)  Pulse:  [] 67  Resp:  [16-20] 16  SpO2:  [95 %-98 %] 97 %  BP: ()/(45-82) 91/45     Weight: 53.7 kg (118 lb 6.4 oz)  Body mass index is 20.97 kg/m².    Intake/Output Summary (Last 24 hours) at 8/27/2022 1217  Last data filed at 8/27/2022 0636  Gross per 24 hour   Intake 290 ml   Output 1350 ml   Net -1060 ml      Physical Exam  Vitals and nursing note reviewed.   Constitutional:       General: She is not in acute distress.     Appearance: Normal appearance.   Cardiovascular:      Rate and Rhythm: Normal rate and regular rhythm.      Pulses: Normal pulses.      Heart sounds: Normal heart sounds. No murmur heard.  Pulmonary:      Effort: Pulmonary effort is normal.      Breath sounds: Rales present. No wheezing.   Abdominal:      General: Bowel sounds are normal. There is no distension.      Palpations: Abdomen is soft.      Tenderness: There is no abdominal tenderness.      Comments: +colostomy   Skin:     General: Skin is warm and dry.   Neurological:      General: No focal deficit present.      Mental Status: She is alert and  oriented to person, place, and time. Mental status is at baseline.   Psychiatric:         Behavior: Behavior normal.         Thought Content: Thought content normal.       Significant Labs: All pertinent labs within the past 24 hours have been reviewed.  CBC:   Recent Labs   Lab 08/26/22  0202   WBC 3.54*   HGB 9.6*   HCT 30.0*        CMP:   Recent Labs   Lab 08/26/22  0202 08/27/22  0415   * 125*   K 4.7 4.5   CL 98 91*   CO2 21* 21*    103   BUN 23 24*   CREATININE 0.9 1.1   CALCIUM 8.2* 8.3*   ANIONGAP 10 13     TSH:   Recent Labs   Lab 08/09/22  1044   TSH 1.682       Significant Imaging: I have reviewed all pertinent imaging results/findings within the past 24 hours.

## 2022-08-27 NOTE — NURSING
End of Shift note:    VSS.  Pain, anxiety, nausea, and flatulence PRNS given with relief noted. Pt very anxious and frequently calling out to  be adjusted in the bed or for multiple PRNs. Purewick in place. Pt rested throughout the shift. Will continue to monitor until oncoming nurse arrives.    Vitals:    08/27/22 0600   BP:    Pulse: 73   Resp:    Temp:         Intake/Output Summary (Last 24 hours) at 8/27/2022 0650  Last data filed at 8/27/2022 0636  Gross per 24 hour   Intake 290 ml   Output 1350 ml   Net -1060 ml

## 2022-08-27 NOTE — CONSULTS
[facility] Consult Note    Date of Admit: 8/26/2022    Chief Complaint/Reason for Consult     Shortness of Breath (Pt c/o difficulty breathing with associated weakness and nausea.  Pmh of CHF)    Reason for consult: Hyponatremia    Subjective:      History of Present Illness:  Odette Hart is a 66 y.o. female who  has a past medical history of Acute coronary syndrome, Acute exacerbation of chronic obstructive pulmonary disease (COPD) (3/23/2022), Allergy, Arthritis, Cardiomyopathy, CHF (congestive heart failure), Coronary artery disease, Coronary artery disease of native artery of native heart with stable angina pectoris (4/19/2021), Diverticulitis, Diverticulosis, Familial hypercholesterolemia (1/22/2022), Former smoker (1/24/2022), Heart attack, Heart disease, History of myocardial infarction (1/24/2022), Hyperlipidemia, Hypertension, Hypertension (1/24/2022), ICD (implantable cardioverter-defibrillator) in place, and Non-ST elevation myocardial infarction (NSTEMI) (2/4/2019).. The patient presented to [facility] on 8/26/22 with worsening shortness of breath. Has history of CHFrEF and has had multiple hospitalizations for dyspnea. BNP > 3k on presentation and pulmonary edema noted on CXR. EF of 10% on last Echo. Patient diuresed with IV Lasix but noted to have worsening hyponatremia prompting nephrology eval.    Past Medical History:  Past Medical History:   Diagnosis Date    Acute coronary syndrome     Acute exacerbation of chronic obstructive pulmonary disease (COPD) 3/23/2022    Allergy     Arthritis     Cardiomyopathy     CHF (congestive heart failure)     Coronary artery disease     Coronary artery disease of native artery of native heart with stable angina pectoris 4/19/2021 1/24/2022: [facility]: Cath: LAD: Proximal stent patent. LCX: Proximal 80%. LCX: Dominant. Moderate disease. LCX: HEVER 2.75 x 18 mm. Distal dissection. HEVER 2.75 x 22 mm.     Diverticulitis     Diverticulosis      Familial hypercholesterolemia 2022    Former smoker 2022    Heart attack     Heart disease     History of myocardial infarction 2022    Hyperlipidemia     Hypertension     Hypertension 2022    ICD (implantable cardioverter-defibrillator) in place     Non-ST elevation myocardial infarction (NSTEMI) 2019       Past Surgical History:  Past Surgical History:   Procedure Laterality Date    APPENDECTOMY N/A 2022    Procedure: APPENDECTOMY;  Surgeon: Rafa Cardozo MD;  Location: [facility] OR 2ND FLR;  Service: Colon and Rectal;  Laterality: N/A;    ATRIAL CARDIAC PACEMAKER INSERTION      CECECTOMY N/A 2022    Procedure: EXCISION, CECUM;  Surgeon: Rafa Cardozo MD;  Location: [facility] OR [facility] FLR;  Service: Colon and Rectal;  Laterality: N/A;     SECTION      CHOLECYSTECTOMY N/A 2022    Procedure: CHOLECYSTECTOMY;  Surgeon: Doug Epstein MD;  Location: [facility] OR [facility] FLR;  Service: General;  Laterality: N/A;    COLONOSCOPY N/A 2022    Procedure: COLONOSCOPY;  Surgeon: Rafa Cardozo MD;  Location: [facility] OR 2ND FLR;  Service: Colon and Rectal;  Laterality: N/A;    COLOSTOMY  2022    Procedure: CREATION, COLOSTOMY;  Surgeon: Rafa Cardozo MD;  Location: [facility] OR 2ND FLR;  Service: Colon and Rectal;;    CORONARY STENT PLACEMENT      LEFT HEART CATHETERIZATION Left 2022    Procedure: CATHETERIZATION, HEART, LEFT;  Surgeon: Yohannes Cordova MD;  Location: [facility] CATH LAB;  Service: Cardiology;  Laterality: Left;       Allergies:  Review of patient's allergies indicates:   Allergen Reactions    Augmentin [amoxicillin-pot clavulanate] Swelling     Not allergic to amoxicillin just a derivative of augmentin    Lisinopril Other (See Comments)     Fluid around heart    Losartan Other (See Comments)     High potassium    Shellfish containing products Anaphylaxis     Pt.states she is allergic to SEAFOOD since the age of 13    Levaquin [levofloxacin] Other (See Comments)      Very bad joint pain    Clindamycin Palpitations     Chest pain       Home Medications:  Prior to Admission medications    Medication Sig Start Date End Date Taking? Authorizing Provider   acetaminophen (TYLENOL) 500 MG tablet Take 2 tablets (1,000 mg total) by mouth every 8 (eight) hours as needed for Pain. 1/26/22  Yes Nghia Hunt MD   albuterol (PROVENTIL/VENTOLIN HFA) 90 mcg/actuation inhaler Inhale 2 puffs into the lungs every 6 (six) hours as needed for Wheezing. Rescue 8/11/22 8/11/23 Yes Braxton Barragan MD   ALPRAZolam (XANAX) 0.5 MG tablet Take 1 tablet (0.5 mg total) by mouth nightly as needed for Anxiety. 8/11/22  Yes Braxton Barragan MD   aspirin (ECOTRIN) 81 MG EC tablet Take 1 tablet (81 mg total) by mouth once daily. 1/26/22  Yes Nghia Hunt MD   carvediloL (COREG) 3.125 MG tablet Take 1 tablet (3.125 mg total) by mouth 2 (two) times daily. 7/23/22 7/23/23 Yes Brandon Anderson MD   clopidogreL (PLAVIX) 75 mg tablet Take 1 tablet (75 mg total) by mouth once daily. 6/24/22  Yes Sanjuanita Briones MD   collagenase (SANTYL) ointment Apply topically once daily. 6/24/22  Yes Sanjuanita Briones MD   docusate sodium (COLACE) 100 MG capsule Take 1 capsule (100 mg total) by mouth 2 (two) times daily. 6/24/22  Yes Sanjuanita Briones MD   esomeprazole (NEXIUM) 40 MG capsule Take 1 capsule (40 mg total) by mouth before breakfast. 6/30/22 6/30/23 Yes Braxton Barragan MD   ezetimibe (ZETIA) 10 mg tablet Take 1 tablet (10 mg total) by mouth every evening. 6/24/22 6/24/23 Yes Sanjuanita Brionse MD   fluticasone propionate (FLONASE) 50 mcg/actuation nasal spray 1 spray (50 mcg total) by Each Nostril route once daily. 8/2/21  Yes Scout Smith MD   food supplemt, lactose-reduced (ENSURE HIGH PROTEIN) Liqd Take 240 mLs by mouth 2 (two) times a day. 8/11/22  Yes Braxton Barragan MD   HYDROcodone-acetaminophen (NORCO) 5-325 mg per tablet Take 1 tablet by mouth every 12 (twelve) hours as needed for Pain. 8/11/22  Yes Braxton LOCKE  MD Yung   nitroGLYCERIN (NITROSTAT) 0.4 MG SL tablet Place 1 tablet (0.4 mg total) under the tongue every 5 (five) minutes as needed for Chest pain. 1/26/22 1/26/23 Yes Nghia Hunt MD   ondansetron (ZOFRAN-ODT) 8 MG TbDL Take 1 tablet (8 mg total) by mouth every 12 (twelve) hours as needed (nausea). 6/30/22  Yes Braxton Barragan MD   polyethylene glycol (GLYCOLAX) 17 gram/dose powder Take 17 g by mouth once daily. 6/24/22  Yes Sanjuanita Briones MD   promethazine (PHENERGAN) 25 MG tablet Take 1 tablet (25 mg total) by mouth every 6 (six) hours as needed for Nausea. 8/18/22  Yes Braxton Barragan MD   simethicone (MYLICON) 80 MG chewable tablet Take 1 tablet (80 mg total) by mouth every 8 (eight) hours as needed for Flatulence. 6/30/22  Yes Braxton Barragan MD   traMADoL (ULTRAM) 50 mg tablet Take 1 tablet (50 mg total) by mouth every 6 (six) hours as needed for Pain. 8/18/22  Yes Mesfin Davis MD   furosemide (LASIX) 40 MG tablet Take 1 tablet (40 mg total) by mouth once daily. Prn wt gain or shortness of breath  Patient taking differently: Take 20 mg by mouth once daily. Prn wt gain or shortness of breath 8/10/22 8/10/23  Freddy Arenas MD   mirtazapine (REMERON) 15 MG tablet Take 1 tablet (15 mg total) by mouth every evening. 8/12/22 8/12/23  Braxton Barragan MD   metoprolol succinate (TOPROL-XL) 25 MG 24 hr tablet Take 1 tablet (25 mg total) by mouth once daily. 5/20/22 5/31/22  Carola Buckley NP   pantoprazole (PROTONIX) 40 MG tablet Take 1 tablet (40 mg total) by mouth once daily. 6/7/22 6/23/22  Braxton Barragan MD   prasugreL (EFFIENT) 10 mg Tab Take 1 tablet (10 mg total) by mouth once daily. 1/27/22 5/31/22  Nghia Hunt MD   spironolactone (ALDACTONE) 25 MG tablet Take 12.5 mg by mouth once daily.  8/8/22  Historical Provider       Family History:  Family History   Problem Relation Age of Onset    Heart disease Mother     Emphysema Father     Heart attack Brother        Social History:  Social  History     Tobacco Use    Smoking status: Former     Packs/day: 0.50     Types: Cigarettes     Start date: 1976     Quit date: 2012     Years since quittin.7    Smokeless tobacco: Never   Substance Use Topics    Alcohol use: No    Drug use: No       Review of Systems:  Pertinent positives and negatives are listed in HPI.  All other systems are reviewed and are negative.     Objective:   Last 24 Hour Vital Signs:  BP  Min: 91/45  Max: 119/74  Temp  Av.4 °F (36.9 °C)  Min: 98.1 °F (36.7 °C)  Max: 99 °F (37.2 °C)  Pulse  Av.1  Min: 65  Max: 104  Resp  Av  Min: 16  Max: 20  SpO2  Av.2 %  Min: 95 %  Max: 97 %  Weight  Av.7 kg (118 lb 6.4 oz)  Min: 53.7 kg (118 lb 6.4 oz)  Max: 53.7 kg (118 lb 6.4 oz)  Body mass index is 20.97 kg/m².  I/O last 3 completed shifts:  In: 290 [P.O.:240; IV Piggyback:50]  Out: 2500 [Urine:2500]    Physical Examination:  Gen: NAD, AAOx3  HEENT: NCAT, EOMI, PERRL, MM, pink conjunctiva  Neck: no thyroidmegaly, no LAD  Cardio: RRR, normal S1/S2, no MRG, JVP wnl  Lungs: +bibasilar crackles  Abd: soft non tender, non distended, normal bowel sounds, no hepatosplenomegaly  Ext: 2+ pulses B/L, no clubbing, cyanosis, edema  Skin: warm, dry, no rashes noted  Neuro: CN 2-12 grossly intact, UE/LE motor 5/5, sensation intact  Psych: conversational, responsive      Laboratory:  Most Recent Data:  CBC:   Lab Results   Component Value Date    WBC 3.54 (L) 2022    HGB 9.6 (L) 2022    HCT 30.0 (L) 2022     2022    MCV 84 2022    RDW 21.8 (H) 2022     BMP:   Lab Results   Component Value Date     (L) 2022    K 3.9 2022    CL 94 (L) 2022    CO2 26 2022    BUN 22 2022    CREATININE 0.9 2022     2022    CALCIUM 8.2 (L) 2022    MG 1.9 2022    PHOS 3.3 2022     Urinalysis:   Lab Results   Component Value Date    LABURIN ENTEROBACTER CLOACAE  10,000 - 49,999 cfu/ml    (A) 08/01/2022    LABURIN (A) 08/01/2022     KLEBSIELLA OXYTOCA  10,000 - 49,999 cfu/ml  No other significant isolate      COLORU Yellow 08/18/2022    CLARITYU Cloudy 12/16/2021    SPECGRAV 1.025 08/18/2022    NITRITE Positive (A) 08/18/2022    KETONESU Negative 08/18/2022    UROBILINOGEN 1.0 08/18/2022    WBCUA 0 08/18/2022       Radiology:  X-Ray Chest AP Portable   Final Result      Cardiomegaly and findings suggestive of pulmonary edema/CHF although correlation for infectious process advised.         Electronically signed by: Lucy Tovar MD   Date:    08/26/2022   Time:    03:21           Assessment and Plan:      Odette Hart is a 66 y.o.female    Hyponatremia  - does have history of chronic hyponatremia with baseline around 129  - On 8/27 noted to have worsening level to 125. Held Lasix and repeated NA level and was up to 131.   - Can give Lasix again today and repeat Na level this evening.    Acute on chronic systolic heart failure  - severely reduced EF of 10% earlier this month  - Cardio following; continue diuresis with Lasix    Hypertension  - Remains on CoReg  - monitor BP with diuresis    Thank you for allowing us to participate in taking care of your patient. Please call us if you have any questions regarding this consult.     Chung Bojorquez MD  [facility]

## 2022-08-27 NOTE — PROGRESS NOTES
"[facility] Medicine  Progress Note    Patient Name: Odette Hart  MRN: [id number]  Patient Class: IP- Inpatient   Admission Date: 8/26/2022  Length of Stay: 1 days  Attending Physician: Nghia Hunt MD  Primary Care Provider: Braxton Barragan MD        Subjective:     Principal Problem:Acute on chronic systolic heart failure        HPI:  Per Michael Long, NP:  "The patient is a 66 y.o. female with a past medical history of MI, HLD, CAD, cardiomyopathy, HTN, and CHF who presents for evaluation of shortness of breath onset earlier today. She reports "I've been feeling bad," endorsing associated symptoms of nausea and weakness. She states she has been near vomiting but denies any vomiting episodes. She reports elevated temperature at 99 F last night. She states her current presentations feel similar to her previous hospitalizations for dyspnea. Pt is regularly followed by cardiology. She denies pain elsewhere and other somatic complaints.  BNP elevated to greater than 3000 with pulmonary edema noted on CXR.  She will be admitted for further management of her acute on chronic systolic congestive heart failure."      Overview/Hospital Course:  No notes on file    Interval History:  Feeling slightly better today.  Breathing is improved but still feels short of breath.  Complains of feeling stiff from lying in bed, but refused physical therapy this morning.    Review of Systems   Constitutional: Negative.  Negative for appetite change, chills and fever.   Respiratory:  Positive for shortness of breath. Negative for cough and wheezing.    Cardiovascular:  Negative for chest pain, palpitations and leg swelling.   Gastrointestinal:  Negative for abdominal pain, constipation, diarrhea, nausea and vomiting.   Genitourinary:  Negative for difficulty urinating.   Objective:     Vital Signs (Most Recent):  Temp: 98.1 °F (36.7 °C) (08/27/22 1126)  Pulse: 67 (08/27/22 1126)  Resp: 16 (08/27/22 " 1126)  BP: (!) 91/45 (08/27/22 1126)  SpO2: 97 % (08/27/22 1126)   Vital Signs (24h Range):  Temp:  [98.1 °F (36.7 °C)-99 °F (37.2 °C)] 98.1 °F (36.7 °C)  Pulse:  [] 67  Resp:  [16-20] 16  SpO2:  [95 %-98 %] 97 %  BP: ()/(45-82) 91/45     Weight: 53.7 kg (118 lb 6.4 oz)  Body mass index is 20.97 kg/m².    Intake/Output Summary (Last 24 hours) at 8/27/2022 1217  Last data filed at 8/27/2022 0636  Gross per 24 hour   Intake 290 ml   Output 1350 ml   Net -1060 ml      Physical Exam  Vitals and nursing note reviewed.   Constitutional:       General: She is not in acute distress.     Appearance: Normal appearance.   Cardiovascular:      Rate and Rhythm: Normal rate and regular rhythm.      Pulses: Normal pulses.      Heart sounds: Normal heart sounds. No murmur heard.  Pulmonary:      Effort: Pulmonary effort is normal.      Breath sounds: Rales present. No wheezing.   Abdominal:      General: Bowel sounds are normal. There is no distension.      Palpations: Abdomen is soft.      Tenderness: There is no abdominal tenderness.      Comments: +colostomy   Skin:     General: Skin is warm and dry.   Neurological:      General: No focal deficit present.      Mental Status: She is alert and oriented to person, place, and time. Mental status is at baseline.   Psychiatric:         Behavior: Behavior normal.         Thought Content: Thought content normal.       Significant Labs: All pertinent labs within the past 24 hours have been reviewed.  CBC:   Recent Labs   Lab 08/26/22  0202   WBC 3.54*   HGB 9.6*   HCT 30.0*        CMP:   Recent Labs   Lab 08/26/22  0202 08/27/22  0415   * 125*   K 4.7 4.5   CL 98 91*   CO2 21* 21*    103   BUN 23 24*   CREATININE 0.9 1.1   CALCIUM 8.2* 8.3*   ANIONGAP 10 13     TSH:   Recent Labs   Lab 08/09/22  1044   TSH 1.682       Significant Imaging: I have reviewed all pertinent imaging results/findings within the past 24 hours.      Assessment/Plan:         Acute on  chronic systolic heart failure  - BNP- 3698, pulmonary edema on CXR; EF 10% on 8/4  - Appreciate cardiology consult  - receiving 40mg IV Lasix BID, net negative 2L thus far if I/O accurate  - hold lasix this morning given sodium (see below)     Hyponatremia  - Chronic with recent baseline of 129  - trended down to 125 today  - hold lasix and consult nephrology    Primary hypertension  - BP tends to run low  - Continue Coreg with hold parameters while diuresing     Familial hypercholesterolemia  - Continue Zetia    Weakness, debility  - PT/OT -- patient refusing        VTE Risk Mitigation (From admission, onward)           Ordered     enoxaparin injection 40 mg  Daily         08/26/22 0325     IP VTE HIGH RISK PATIENT  Once         08/26/22 0325     Place sequential compression device  Until discontinued         08/26/22 0325                    Discharge Planning   SIVAN:      Code Status: Full Code   Is the patient medically ready for discharge?:     Reason for patient still in hospital (select all that apply): Patient trending condition and Treatment  Discharge Plan A: Home with family                  Nikki Mckinley PA-C  Department of Hospital Medicine   [facility] ([place])

## 2022-08-27 NOTE — PT/OT/SLP EVAL
"Occupational Therapy   Evaluation and Treatment    Name: Odette Hart  MRN: [id number]  Admitting Diagnosis:  Acute on chronic systolic heart failure  Recent Surgery: * No surgery found *      Recommendations:     Discharge Recommendations:  (TBD as pt progresses.   Pt lives alone and is needing 24/7 assist at this time.)  Discharge Equipment Recommendations:   (TBD)  Barriers to discharge:  Inaccessible home environment, Decreased caregiver support (Current functional level.)    Assessment:     Odette Hart is a 66 y.o. female with a medical diagnosis of Acute on chronic systolic heart failure.  She presents alert and reporting, "my heart got weaker", when OT asked pt how she has been doing since the last time OT had seen her.  Pt expressing concerns about caring for herself and getting around since she has had such a decline in her functional level.  Performance deficits affecting function: weakness, impaired self care skills, impaired functional mobility, impaired endurance, gait instability, impaired balance, decreased coordination, impaired skin, pain, decreased lower extremity function, impaired fine motor, impaired cardiopulmonary response to activity.      Rehab Prognosis: Guarded; patient would benefit from acute skilled OT services to address these deficits and reach maximum level of function.       Plan:     Patient to be seen 4 x/week to address the above listed problems via self-care/home management, therapeutic activities  · Plan of Care Expires: 09/25/22  · Plan of Care Reviewed with: patient    Subjective     Chief Complaint: "My heart got weaker"  Patient/Family Comments/goals: To be able to breath easier, not get fatigued so quickly, and feel stronger so she can return home.    Occupational Profile:  Living Environment: Lives alone in mobile home with 4 SKYLER, Right HR, tub/shower  Previous level of function: Pt was Mod I and driving to outpatient wound care, using rollator; Pt would need " minimal assist occasionally with IADL.  Reports recently she had to start having assist from her friend daily and no longer could drive and unable to participate in Outpatient PT which she was supposed to start recently.  Pt reporting grandson in his 20's was living with her but has moved out of state.    Equipment Used at Home:  rollator  Assistance upon Discharge: Friend but unsure how much assist she will have from her long term.    Pain/Comfort:  Pain Rating 1:  (Pt reporting her backside hurt but lying on her side relieves the pain.  Pt did not rate pain.)  Pain Addressed 1: Reposition  Pain Rating Post-Intervention 1:  (Pt reporting being comfortable at rest in sidelying.)    Patients cultural, spiritual, Adventism conflicts given the current situation: no    Objective:     Communicated with: nurse prior to session.  Patient found in right sidelying with HOB elevated but scooted down in bed with peripheral IV, oxygen, PureWick (ostomy) upon OT entry to room.    General Precautions: Standard, fall (EF of 10%)   Orthopedic Precautions:N/A   Braces: N/A  Respiratory Status: Pt reporting she was told she no longer needs O2 but states she feels better and more relaxed wearing it.    Occupational Performance:    Bed Mobility:    · Scooting to HOB in supine using bed rails: Min A    Functional Mobility/Transfers:  · Functional Mobility: Pt declined getting OOB.    Activities of Daily Living:  · Grooming: Set up for oral care in bed  · Toileting: Using Pure Wick, OT set up BSC height for pt to use BSC with assist of nursing    Cognitive/Visual Perceptual:  Cognitive/Psychosocial Skills:     -       Oriented to: Person, Place, Time and Situation   -       Follows Commands/attention:Follows one-step commands  -       Communication: clear/fluent  -       Memory: No Deficits noted  -       Safety awareness/insight to disability: intact   -       Mood/Affect/Coping skills/emotional control: Cooperative and  Pleasant    Physical Exam:  UE AROM: WFL for self care  UE Strength: Grossly overall 4-/5  UE Coordination: WFL for self care  Sensation: Light touch intact  Edema: None noted in extremities    AMPAC 6 Click ADL:  AMPA Total Score: 17    Treatment & Education:  Role of OT, POC, use of BSC with assist of nursing staff, positioning in bed with chest and head elevated for cardiopulmonary function, use of call button    Education:    Patient left right sidelying with HOB elevated with all lines intact, call button in reach and nurse notified    GOALS:   Multidisciplinary Problems     Occupational Therapy Goals        Problem: Occupational Therapy    Goal Priority Disciplines Outcome Interventions   Occupational Therapy Goal     OT, PT/OT     Description: Goals to be met by: 9/3/22    Patient will increase functional independence with ADLs by performing:    BSC/toilet transfers at SBA.  ToIleting at SBA.  LB dressing at SBA.  UB dressing at SBA.  Grooming sitting EOB at SBA.                     History:     Past Medical History:   Diagnosis Date    Acute coronary syndrome     Acute exacerbation of chronic obstructive pulmonary disease (COPD) 3/23/2022    Allergy     Arthritis     Cardiomyopathy     CHF (congestive heart failure)     Coronary artery disease     Coronary artery disease of native artery of native heart with stable angina pectoris 4/19/2021 1/24/2022: [facility]: Cath: LAD: Proximal stent patent. LCX: Proximal 80%. LCX: Dominant. Moderate disease. LCX: HEVER 2.75 x 18 mm. Distal dissection. HEVER 2.75 x 22 mm.     Diverticulitis     Diverticulosis     Familial hypercholesterolemia 1/22/2022    Former smoker 1/24/2022    Heart attack     Heart disease     History of myocardial infarction 1/24/2022    Hyperlipidemia     Hypertension     Hypertension 1/24/2022    ICD (implantable cardioverter-defibrillator) in place     Non-ST elevation myocardial infarction (NSTEMI) 2/4/2019         Past  Surgical History:   Procedure Laterality Date    APPENDECTOMY N/A 2022    Procedure: APPENDECTOMY;  Surgeon: Rafa Cardozo MD;  Location: [facility] OR [facility] FLR;  Service: Colon and Rectal;  Laterality: N/A;    ATRIAL CARDIAC PACEMAKER INSERTION      CECECTOMY N/A 2022    Procedure: EXCISION, CECUM;  Surgeon: Rafa Cardozo MD;  [facility];  Service: Colon and Rectal;  Laterality: N/A;     SECTION      CHOLECYSTECTOMY N/A 2022    Procedure: CHOLECYSTECTOMY;  Surgeon: Doug Epstein MD;  [facility];  Service: General;  Laterality: N/A;    COLONOSCOPY N/A 2022    Procedure: COLONOSCOPY;  Surgeon: Rafa Cardozo MD;  Location: [facility] OR [facility] FLR;  Service: Colon and Rectal;  Laterality: N/A;    COLOSTOMY  2022    Procedure: CREATION, COLOSTOMY;  Surgeon: Rafa Cardozo MD;  [facility];  Service: Colon and Rectal;;    CORONARY STENT PLACEMENT      LEFT HEART CATHETERIZATION Left 2022    Procedure: CATHETERIZATION, HEART, LEFT;  Surgeon: Yohannes Cordova MD;  Location: [facility] CATH LAB;  Service: Cardiology;  Laterality: Left;       Time Tracking:     OT Date of Treatment: 22  OT Start Time: 1154  OT Stop Time: 1216  OT Total Time (min): 22 min    Billable Minutes:Evaluation 12  Self Care/Home Management 10    2022
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
## 2022-08-27 NOTE — CONSULTS
Food & Nutrition  Education    Diet Education: Low Na, Fluid Restriction  Time Spent: RD remote   Learners:Patient       Nutrition Education provided with handouts:   DASH diet   Heart Healthy   Cooking Tips  Dietary Fats     Comments: On site RD to follow up  All questions and concerns answered. Dietitian's contact information provided.       Follow-Up: Yes    Please Re-consult as needed          [facility] ([place]  Adult Nutrition  Consult Note    SUMMARY     Recommendations    Recommendation/Intervention:   1. Encourage po intake    2. Provide recommended diet education   3. Collaboration with medical providers    Goals: Patient to consume, tolerate, and understand the importance of compliance of recommended diet.    Nutrition Goal Status: new, progressing towards goal    Communication of RD Recs: other (comment) (poc, consults)    Assessment and Plan  Nutrition Problem  Increased nutritional needs    Related to (etiology):   Condition associated with diagnoses    Signs and Symptoms (as evidenced by):   Wounds, delayed healing and decreased po intake     Interventions/Recommendations (treatment strategy):  1. Encourage po intake    2. Provide recommended diet education   3. Collaboration with medical providers    Nutrition Diagnosis Status:   Continues      Malnutrition Assessment                                       Reason for Assessment    Reason For Assessment: consult, other (see comments) (low na and fluid restriction educations)    Diagnosis: cardiac disease, gastrointestinal disease  Patient Active Problem List   Diagnosis    Ischemic cardiomyopathy    Automatic implantable cardioverter-defibrillator in situ    History of coronary artery stent placement    Chronic congestive heart failure    Elevated rheumatoid factor    RBBB with left anterior fascicular block    History of diverticulitis    Coronary artery disease of native artery of native heart with stable angina pectoris    Familial  hypercholesterolemia    Normocytic anemia    History of myocardial infarction    Primary hypertension    Former smoker    Colovesical fistula    Urinary tract infection with pyuria    Leukopenia    At risk for surgical complication    Acute cholecystitis    Calculus of gallbladder with cholecystitis without biliary obstruction    Anemia    Acute on chronic systolic heart failure    Dizziness    Mood disorder    Alteration in skin integrity in adult    Prolonged Q-T interval on ECG    Hyponatremia    Weakness    Recurrent major depressive disorder    Hypotension    VRE (vancomycin-resistant Enterococci) infection    Hypomagnesemia    CAD (coronary artery disease)    Increased nutritional needs    COPD (chronic obstructive pulmonary disease)    Superficial dehiscence of operation wound    Cellulitis of abdominal wall    Hypoalbuminemia    Severe protein-calorie malnutrition    Wound infection after surgery    Abdominal wall cellulitis    Advance care planning    Peripheral edema    History of partial colectomy    Colostomy in place    Asymptomatic bacteriuria    Hypertriglyceridemia    Statin intolerance     Past Medical History:   Diagnosis Date    Acute coronary syndrome     Acute exacerbation of chronic obstructive pulmonary disease (COPD) 3/23/2022    Allergy     Arthritis     Cardiomyopathy     CHF (congestive heart failure)     Coronary artery disease     Coronary artery disease of native artery of native heart with stable angina pectoris 4/19/2021 1/24/2022: [facility]: Cath: LAD: Proximal stent patent. LCX: Proximal 80%. LCX: Dominant. Moderate disease. LCX: HEVER 2.75 x 18 mm. Distal dissection. HEVER 2.75 x 22 mm.     Diverticulitis     Diverticulosis     Familial hypercholesterolemia 1/22/2022    Former smoker 1/24/2022    Heart attack     Heart disease     History of myocardial infarction 1/24/2022    Hyperlipidemia     Hypertension     Hypertension 1/24/2022    ICD (implantable cardioverter-defibrillator) in  "place     Non-ST elevation myocardial infarction (NSTEMI) 2/4/2019       General Information Comments:   8/27/2022: Patient with a 2gm, 1500ml fluid restriction diet.  PO intake noted at 50% with reported decreased po intake and fair tolerance. Allergies: Shellfish. Patient with previous medical history of malnutrition related to muscle and fat wasting along with decreased po intake.  Patient's weight history shows weight gain from 52.6kg on 7/22 to current weight of 53.7kg.  On site RD to follow up with [facility].  Patient with colostomy and reduced bowel sounds.  Sacral wound noted with ezra ordered BID to promote wound healing. RD consulted for low na, fluid restriction educations.  Handouts provided with discharge paperwork.  RD to continue to monitor.  (RD remote)    Nutrition Discharge Planning: Patient to consume and tolerate adequate nutrition to support wound healing.    Nutrition Risk Screen    Nutrition Risk Screen: large or nonhealing wound, burn or pressure injury    Nutrition/Diet History    Spiritual, Cultural Beliefs, Muslim Practices, Values that Affect Care: no  Supplemental Drinks or Food Habits: Ezra  Food Allergies: NKFA  Factors Affecting Nutritional Intake: malabsorption    Anthropometrics    Temp: 98.8 °F (37.1 °C)  Height Method: Stated  Height: 5' 3" (160 cm)  Height (inches): 63 in  Weight Method: Bed Scale  Weight: 53.7 kg (118 lb 6.4 oz)  Weight (lb): 118.4 lb  Ideal Body Weight (IBW), Female: 115 lb  % Ideal Body Weight, Female (lb): 102.96 %  BMI (Calculated): 21  BMI Grade: 18.5-24.9 - normal     Wt Readings from Last 16 Encounters:   08/27/22 53.7 kg (118 lb 6.4 oz)   08/18/22 50.3 kg (111 lb)   08/12/22 50.6 kg (111 lb 8 oz)   08/11/22 50.4 kg (111 lb)   08/04/22 53.5 kg (118 lb)   07/18/22 52.6 kg (115 lb 15.4 oz)   07/12/22 51.3 kg (113 lb)   07/07/22 51.3 kg (113 lb 1.5 oz)   06/30/22 51 kg (112 lb 7 oz)   06/22/22 52.1 kg (114 lb 13.8 oz)   06/06/22 53.9 kg (118 lb 13.3 oz) "   05/25/22 53.9 kg (118 lb 13.3 oz)   05/16/22 59.4 kg (130 lb 15.3 oz)   05/05/22 60.7 kg (133 lb 13.1 oz)   04/20/22 53.1 kg (117 lb 1 oz)   04/20/22 53.1 kg (117 lb 1 oz)       Lab/Procedures/Meds    Pertinent Labs Reviewed: reviewed  Pertinent Medications Reviewed: reviewed  BMP  Lab Results   Component Value Date     (L) 08/27/2022    K 3.9 08/27/2022    CL 94 (L) 08/27/2022    CO2 26 08/27/2022    BUN 22 08/27/2022    CREATININE 0.9 08/27/2022    CALCIUM 8.2 (L) 08/27/2022    ANIONGAP 11 08/27/2022    ESTGFRAFRICA >60 07/23/2022    EGFRNONAA >60 07/23/2022     Lab Results   Component Value Date     (L) 08/27/2022    K 3.9 08/27/2022    CL 94 (L) 08/27/2022    CO2 26 08/27/2022     Lab Results   Component Value Date    CREATININE 0.9 08/27/2022    BUN 22 08/27/2022     (L) 08/27/2022    K 3.9 08/27/2022    CL 94 (L) 08/27/2022    CO2 26 08/27/2022     Lab Results   Component Value Date    CALCIUM 8.2 (L) 08/27/2022    PHOS 3.3 08/26/2022     Lab Results   Component Value Date    LABPROT 12.5 07/21/2022    ALBUMIN 2.4 (L) 08/18/2022     Lab Results   Component Value Date    HGBA1C 5.0 08/26/2022     Scheduled Meds:   aspirin  81 mg Oral Daily    carvediloL  3.125 mg Oral BID    clopidogreL  75 mg Oral Daily    docusate sodium  100 mg Oral BID    enoxaparin  40 mg Subcutaneous Daily    ezetimibe  10 mg Oral QHS    pantoprazole  40 mg Oral Daily     Continuous Infusions:  PRN Meds:.ALPRAZolam, ondansetron, simethicone, sodium chloride 0.9%, traMADoL    Physical Findings/Assessment         Estimated/Assessed Needs    Weight Used For Calorie Calculations: 53.7 kg (118 lb 6.2 oz)  Energy Calorie Requirements (kcal): 25-30kcals (1342-1611kcals per day)     Protein Requirements: 1.0-1.2g/kg  Weight Used For Protein Calculations: 53.7 kg (118 lb 6.2 oz)  Fluid Requirements (mL): 1500ml fluid restriction  Estimated Fluid Requirement Method: other (see comments) (fluid restriction)  RDA Method (mL):  25  CHO Requirement: 168-201      Nutrition Prescription Ordered    Current Diet Order: 2 gm na, 1500 fluid restriction  Oral Nutrition Supplement: yoana BID    Evaluation of Received Nutrient/Fluid Intake    % Kcal Needs: 50  % Protein Needs: 50  Energy Calories Required: meeting needs  Protein Required: meeting needs  Fluid Required: meeting needs  Tolerance: tolerating  % Intake of Estimated Energy Needs: 50 - 75 %  % Meal Intake: 50 - 75 %  Intake/Output - Last 3 Shifts         08/25 0700 08/26 0659 08/26 0700 08/27 0659 08/27 0700 08/28 0659    P.O.  240 600    IV Piggyback  50     Total Intake(mL/kg)  290 (5.4) 600 (11.2)    Urine (mL/kg/hr) 1150 1350 (1) 850 (1.6)    Total Output 1150 1350 850    Net -1150 -1060 -250                   Nutrition Risk    Level of Risk/Frequency of Follow-up: moderate       Monitor and Evaluation    Food and Nutrient Intake: energy intake, food and beverage intake  Food and Nutrient Adminstration: diet order  Knowledge/Beliefs/Attitudes: food and nutrition knowledge/skill, beliefs and attitudes  Physical Activity and Function: nutrition-related ADLs and IADLs, factors affecting access to physical activity  Anthropometric Measurements: height/length, weight, weight change, body mass index  Biochemical Data, Medical Tests and Procedures: glucose/endocrine profile, gastrointestinal profile, electrolyte and renal panel, lipid profile, inflammatory profile       Nutrition Follow-Up    RD Follow-up?: Yes  Suri Rivers, MS, RDN, LDN

## 2022-08-27 NOTE — PLAN OF CARE
Nutrition Plan of Care:    Recommendation/Intervention:   1. Encourage po intake    2. Provide recommended diet education   3. Collaboration with medical providers    Goals: Patient to consume, tolerate, and understand the importance of compliance of recommended diet.    Nutrition Goal Status: new, progressing towards goal    Communication of RD Recs: other (comment) (poc, consults)    Suri Rivers MS, RDN, LDN

## 2022-08-27 NOTE — PROGRESS NOTES
"Patient refuses to participate in physical therapy "I did not come here for physical therapy, I came here because I don't feel good and my heart". PT explained the purpose of PT and the benefits of PT related to her condition. Patient reports there is no chance she will participate.   "

## 2022-08-27 NOTE — PLAN OF CARE
Problem: Fall Injury Risk  Goal: Absence of Fall and Fall-Related Injury  Outcome: Ongoing, Progressing     Problem: Skin Injury Risk Increased  Goal: Skin Health and Integrity  Outcome: Ongoing, Progressing     Problem: Impaired Wound Healing  Goal: Optimal Wound Healing  Outcome: Ongoing, Progressing     Problem: Adult Inpatient Plan of Care  Goal: Plan of Care Review  Outcome: Ongoing, Progressing  Goal: Patient-Specific Goal (Individualized)  Outcome: Ongoing, Progressing  Goal: Absence of Hospital-Acquired Illness or Injury  Outcome: Ongoing, Progressing  Goal: Optimal Comfort and Wellbeing  Outcome: Ongoing, Progressing  Goal: Readiness for Transition of Care  Outcome: Ongoing, Progressing

## 2022-08-28 NOTE — PROGRESS NOTES
"[facility] Medicine  Progress Note    Patient Name: Odette Hart  MRN: [id number]  Patient Class: IP- Inpatient   Admission Date: 8/26/2022  Length of Stay: 2 days  Attending Physician: Nghia Hunt MD  Primary Care Provider: Braxton Barragan MD        Subjective:     Principal Problem:Acute on chronic systolic heart failure        HPI:  Per Michael Long, NP:  "The patient is a 66 y.o. female with a past medical history of MI, HLD, CAD, cardiomyopathy, HTN, and CHF who presents for evaluation of shortness of breath onset earlier today. She reports "I've been feeling bad," endorsing associated symptoms of nausea and weakness. She states she has been near vomiting but denies any vomiting episodes. She reports elevated temperature at 99 F last night. She states her current presentations feel similar to her previous hospitalizations for dyspnea. Pt is regularly followed by cardiology. She denies pain elsewhere and other somatic complaints.  BNP elevated to greater than 3000 with pulmonary edema noted on CXR.  She will be admitted for further management of her acute on chronic systolic congestive heart failure."      Overview/Hospital Course:  No notes on file    Interval History:  Breathing continues to improve but still not to baseline.      Review of Systems   Constitutional: Negative.  Negative for appetite change, chills and fever.   Respiratory:  Positive for shortness of breath. Negative for cough and wheezing.    Cardiovascular:  Negative for chest pain, palpitations and leg swelling.   Gastrointestinal:  Negative for abdominal pain, constipation, diarrhea, nausea and vomiting.   Genitourinary:  Negative for difficulty urinating.   Objective:     Vital Signs (Most Recent):  Temp: 98.3 °F (36.8 °C) (08/28/22 0825)  Pulse: 72 (08/28/22 1000)  Resp: 18 (08/28/22 0825)  BP: (!) 91/59 (08/28/22 0825)  SpO2: 96 % (08/28/22 0825)   Vital Signs (24h Range):  Temp:  [98.1 °F (36.7 " °C)-99.7 °F (37.6 °C)] 98.3 °F (36.8 °C)  Pulse:  [65-82] 72  Resp:  [15-18] 18  SpO2:  [95 %-100 %] 96 %  BP: (78-99)/(45-59) 91/59     Weight: 53.7 kg (118 lb 6.4 oz)  Body mass index is 20.97 kg/m².    Intake/Output Summary (Last 24 hours) at 8/28/2022 1118  Last data filed at 8/28/2022 0500  Gross per 24 hour   Intake 840 ml   Output 2100 ml   Net -1260 ml      Physical Exam  Vitals and nursing note reviewed.   Constitutional:       General: She is not in acute distress.     Appearance: Normal appearance.   Cardiovascular:      Rate and Rhythm: Normal rate and regular rhythm.      Pulses: Normal pulses.      Heart sounds: Normal heart sounds. No murmur heard.  Pulmonary:      Effort: Pulmonary effort is normal.      Breath sounds: Rales present. No wheezing.   Abdominal:      General: Bowel sounds are normal. There is no distension.      Palpations: Abdomen is soft.      Tenderness: There is no abdominal tenderness.      Comments: +colostomy   Skin:     General: Skin is warm and dry.   Neurological:      General: No focal deficit present.      Mental Status: She is alert and oriented to person, place, and time. Mental status is at baseline.   Psychiatric:         Behavior: Behavior normal.         Thought Content: Thought content normal.       Significant Labs: All pertinent labs within the past 24 hours have been reviewed.  CBC:   No results for input(s): WBC, HGB, HCT, PLT in the last 48 hours.    CMP:   Recent Labs   Lab 08/27/22  1337 08/27/22  1828 08/28/22  0402   * 127* 127*   K 3.9 3.6 3.3*   CL 94* 93* 91*   CO2 26 22* 24    123* 130*   BUN 22 19 18   CREATININE 0.9 0.8 0.8   CALCIUM 8.2* 8.1* 7.6*   ANIONGAP 11 12 12     TSH:   Recent Labs   Lab 08/09/22  1044   TSH 1.682       Significant Imaging: I have reviewed all pertinent imaging results/findings within the past 24 hours.      Assessment/Plan:         Acute on chronic systolic heart failure  - BNP- 3698, pulmonary edema on CXR; EF  10% on 8/4  - Appreciate cardiology consult  - received 40mg IV Lasix BID initially, transitioned to once daily given sodium (see below)  - breathing improving but still with rales     Hyponatremia  - Chronic with recent baseline of 129  - trended down to 125, but repeat 131 >>127>>127 so question lab accuracy.  - nephrology following  - continue lasix cautiously and trend BMP    Primary hypertension  - BP tends to run low  - Continue Coreg with hold parameters while diuresing     Familial hypercholesterolemia  - Continue Zetia    Weakness, debility  - PT/OT -- patient refusing        VTE Risk Mitigation (From admission, onward)           Ordered     enoxaparin injection 40 mg  Daily         08/26/22 0325     IP VTE HIGH RISK PATIENT  Once         08/26/22 0325     Place sequential compression device  Until discontinued         08/26/22 0325                    Discharge Planning   SIVAN:      Code Status: Full Code   Is the patient medically ready for discharge?:     Reason for patient still in hospital (select all that apply): Patient trending condition and Treatment  Discharge Plan A: Home with family                  Nikki Mckinley PA-C  Department of Hospital Medicine   [facility], [place], operated by [facility] ([place])

## 2022-08-28 NOTE — PROGRESS NOTES
[facility] Nephrology Progress Note    Date of Admit: 2022    Chief Complaint/Reason for Consult     Shortness of Breath (Pt c/o difficulty breathing with associated weakness and nausea.  Pmh of CHF)    Reason for consult: Hyponatremia    Subjective:      History of Present Illness:  Odette Hart is a 66 y.o. female who  presented to [facility] on 22 with worsening shortness of breath. Has history of CHFrEF and has had multiple hospitalizations for dyspnea. BNP > 3k on presentation and pulmonary edema noted on CXR. EF of 10% on last Echo. Patient diuresed with IV Lasix but noted to have worsening hyponatremia prompting nephrology eval.    Interval history: Patient's sodium level 127 overnight and again this morning. 1.75 L UOP. Lasix given again this morning.    Review of Systems:  Pertinent positives and negatives are listed in HPI.  All other systems are reviewed and are negative.     Objective:   Last 24 Hour Vital Signs:  BP  Min: 78/49  Max: 99/58  Temp  Av.9 °F (37.2 °C)  Min: 98.3 °F (36.8 °C)  Max: 99.7 °F (37.6 °C)  Pulse  Av.5  Min: 70  Max: 82  Resp  Av  Min: 15  Max: 18  SpO2  Av.6 %  Min: 95 %  Max: 99 %  Weight  Av.7 kg (118 lb 6.4 oz)  Min: 53.7 kg (118 lb 6.4 oz)  Max: 53.7 kg (118 lb 6.4 oz)  Body mass index is 20.97 kg/m².  I/O last 3 completed shifts:  In: 1370 [P.O.:1320; IV Piggyback:50]  Out: 2350 [Urine:2000; Stool:350]    Physical Examination:  Gen: NAD, AAOx3  HEENT: NCAT, EOMI, PERRL, MM, pink conjunctiva  Neck: no thyroidmegaly, no LAD  Cardio: RRR, normal S1/S2, no MRG, JVP wnl  Lungs: +bibasilar crackles  Abd: soft non tender, non distended, normal bowel sounds, no hepatosplenomegaly  Ext: 2+ pulses B/L, no clubbing, cyanosis, edema  Skin: warm, dry, no rashes noted  Neuro: CN 2-12 grossly intact, UE/LE motor 5/5, sensation intact  Psych: conversational, responsive      Laboratory:  Most Recent Data:  CBC:   Lab Results   Component Value  Date    WBC 3.54 (L) 08/26/2022    HGB 9.6 (L) 08/26/2022    HCT 30.0 (L) 08/26/2022     08/26/2022    MCV 84 08/26/2022    RDW 21.8 (H) 08/26/2022     BMP:   Lab Results   Component Value Date     (L) 08/28/2022    K 3.3 (L) 08/28/2022    CL 91 (L) 08/28/2022    CO2 24 08/28/2022    BUN 18 08/28/2022    CREATININE 0.8 08/28/2022     (H) 08/28/2022    CALCIUM 7.6 (L) 08/28/2022    MG 1.9 08/26/2022    PHOS 3.3 08/26/2022     Urinalysis:   Lab Results   Component Value Date    LABURIN ENTEROBACTER CLOACAE  10,000 - 49,999 cfu/ml   (A) 08/01/2022    LABURIN (A) 08/01/2022     KLEBSIELLA OXYTOCA  10,000 - 49,999 cfu/ml  No other significant isolate      COLORU Yellow 08/18/2022    CLARITYU Cloudy 12/16/2021    SPECGRAV 1.025 08/18/2022    NITRITE Positive (A) 08/18/2022    KETONESU Negative 08/18/2022    UROBILINOGEN 1.0 08/18/2022    WBCUA 0 08/18/2022       Radiology:  X-Ray Chest AP Portable   Final Result      Cardiomegaly and findings suggestive of pulmonary edema/CHF although correlation for infectious process advised.         Electronically signed by: Lucy Tovar MD   Date:    08/26/2022   Time:    03:21           Assessment and Plan:      Odette Hart is a 66 y.o.female    Hyponatremia  - does have history of chronic hyponatremia with baseline around 129  - On 8/27 noted to have worsening level to 125. Held Lasix and repeated NA level and was up to 131. Then 127 -> 127  - Given Lasix again this morning  - Repeat BMP just collected; will follow trend  - Discussed with patient importance of restricting free water    Acute on chronic systolic heart failure  - severely reduced EF of 10% earlier this month  - Cardio following; continue diuresis with Lasix    Hypertension  - Remains on CoReg  - monitor BP with diuresis    Thank you for allowing us to participate in taking care of your patient. Please call us if you have any questions regarding this consult.     Chung Bojorquez MD  [place]  Nephrology

## 2022-08-28 NOTE — PROGRESS NOTES
"Patient reported that she would attempt PT today, but she received an injection of "fluid medication" and does not want to risk having to use the restroom while doing Physical Therapy.  Pt also reported that has a RW , BSC and rollator.   She stated that she was able to ambulate independently at home and used a RW outside of her mobile home prior to hospital admission.  She has 4 steps with railings to enter the mobile home.    She stated that she was supposed to start outpatient PT at [facility] after her last hospital stay, but was evaluated by home health.  She reported that home health was unable to continue her treatments.    Physical Therapy will follow up with patient on Monday 8/30/2022.  She appears more willing to participate at this time.    Asael Richardson, PT    "
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
## 2022-08-28 NOTE — SUBJECTIVE & OBJECTIVE
Interval History:  Reports improvement in  shortness of breath, biggest complaint is lethargy. Discussed Na 127, holding lasix and tolvaptan x 1 per nephrology. Palliative care consulted    Review of Systems   Constitutional:  Negative for chills and fever.   Respiratory:  Positive for shortness of breath. Negative for cough and wheezing.    Cardiovascular:  Negative for chest pain, palpitations and leg swelling.   Gastrointestinal:  Negative for abdominal pain, constipation, diarrhea, nausea and vomiting.   Genitourinary:  Negative for difficulty urinating.   Musculoskeletal:  Negative for arthralgias and gait problem.   Neurological:  Positive for weakness.   Psychiatric/Behavioral:  Negative for agitation and confusion.    Objective:     Vital Signs (Most Recent):  Temp: 98.5 °F (36.9 °C) (08/29/22 0902)  Pulse: 80 (08/29/22 1000)  Resp: 18 (08/29/22 0902)  BP: (!) 100/55 (08/29/22 0904)  SpO2: 96 % (08/29/22 0902)   Vital Signs (24h Range):  Temp:  [98.3 °F (36.8 °C)-98.9 °F (37.2 °C)] 98.5 °F (36.9 °C)  Pulse:  [69-85] 80  Resp:  [15-20] 18  SpO2:  [96 %-100 %] 96 %  BP: ()/(50-55) 100/55     Weight: 53.7 kg (118 lb 6.2 oz)  Body mass index is 20.97 kg/m².    Intake/Output Summary (Last 24 hours) at 8/29/2022 1104  Last data filed at 8/29/2022 0500  Gross per 24 hour   Intake 480 ml   Output 2100 ml   Net -1620 ml      Physical Exam  Vitals and nursing note reviewed.   Constitutional:       General: She is not in acute distress.     Appearance: Normal appearance.   Cardiovascular:      Rate and Rhythm: Normal rate and regular rhythm.      Pulses: Normal pulses.      Heart sounds: Normal heart sounds. No murmur heard.  Pulmonary:      Effort: Pulmonary effort is normal. No respiratory distress.      Breath sounds: No wheezing or rales.   Abdominal:      General: Abdomen is flat. Bowel sounds are normal. There is no distension.      Palpations: Abdomen is soft.      Tenderness: There is no abdominal  tenderness.      Comments: +colostomy   Skin:     General: Skin is warm and dry.   Neurological:      General: No focal deficit present.      Mental Status: She is alert and oriented to person, place, and time. Mental status is at baseline.   Psychiatric:         Behavior: Behavior normal.         Thought Content: Thought content normal.       Significant Labs: All pertinent labs within the past 24 hours have been reviewed.  CBC:   No results for input(s): WBC, HGB, HCT, PLT in the last 48 hours.    CMP:   Recent Labs   Lab 08/27/22  1828 08/28/22  0402 08/29/22  0517   * 127* 127*   K 3.6 3.3* 3.5   CL 93* 91* 90*   CO2 22* 24 27   * 130* 95   BUN 19 18 13   CREATININE 0.8 0.8 0.7   CALCIUM 8.1* 7.6* 7.9*   PROT  --   --  6.7   ALBUMIN  --   --  1.9*   BILITOT  --   --  1.1*   ALKPHOS  --   --  108   AST  --   --  43*   ALT  --   --  34   ANIONGAP 12 12 10     TSH:   Recent Labs   Lab 08/09/22  1044   TSH 1.682       Significant Imaging: I have reviewed all pertinent imaging results/findings within the past 24 hours.

## 2022-08-29 NOTE — ASSESSMENT & PLAN NOTE
- BNP- 3698, pulmonary edema on CXR; EF 10% on 8/4  - Appreciate cardiology consult  - received 40mg IV Lasix BID initially, transitioned to once daily given sodium (see below)  - breathing improving but still with rales  - continue coreg, resume spironolactone today per cardiology

## 2022-08-29 NOTE — PLAN OF CARE
Problem: Physical Therapy  Goal: Physical Therapy Goal  Description: Goals to be met by: 2022    Patient will increase functional independence with mobility by performin. Sit<>stand with supervision with least restrictive AD.  2. Gait x 200 feet with SBA with least restrictive AD.  3. Ascend/descend 4 step(s) with least restrictive assistive device and CGA.      Outcome: Ongoing, Progressing     Patient evaluated by PT and goals established. Patient able to ambulate 100 feet with CGA using the RW. Notable BLE weakness. Patient discharge recommendation home with home health physical therapy. Patient reports she will have assistance every evening and intermittently throughout the day from friends. Discharge DME TBD pending patient progress.  PT will continue to follow and progress as tolerated. Please see progress note for detailed plan of care and recommendations.
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
## 2022-08-29 NOTE — PROGRESS NOTES
[facility] Nephrology Progress Note    Date of Admit: 2022    Chief Complaint/Reason for Consult     Shortness of Breath (Pt c/o difficulty breathing with associated weakness and nausea.  Pmh of CHF)    Reason for consult: Hyponatremia    Subjective:      History of Present Illness:  Odette Hart is a 66 y.o. female who  presented to [facility] on 22 with worsening shortness of breath. Has history of CHFrEF and has had multiple hospitalizations for dyspnea. BNP > 3k on presentation and pulmonary edema noted on CXR. EF of 10% on last Echo. Patient diuresed with IV Lasix but noted to have worsening hyponatremia prompting nephrology eval.    Interval history:   still with SOB and volume overload.  Na stuck at 127.  Discussed with team.     Review of Systems:  Pertinent positives and negatives are listed in HPI.  All other systems are reviewed and are negative.     Objective:   Last 24 Hour Vital Signs:  BP  Min: 91/53  Max: 108/50  Temp  Av.6 °F (37 °C)  Min: 98.3 °F (36.8 °C)  Max: 98.9 °F (37.2 °C)  Pulse  Av.8  Min: 69  Max: 85  Resp  Av.3  Min: 15  Max: 20  SpO2  Av %  Min: 96 %  Max: 100 %  Weight  Av.7 kg (118 lb 6.2 oz)  Min: 53.7 kg (118 lb 6.2 oz)  Max: 53.7 kg (118 lb 6.2 oz)  Body mass index is 20.97 kg/m².  I/O last 3 completed shifts:  In: 960 [P.O.:960]  Out: 3500 [Urine:3500]    Physical Examination:  Gen: NAD, AAOx3  HEENT: NCAT, EOMI, PERRL, MM, pink conjunctiva  Neck: no thyroidmegaly, no LAD  Cardio: RRR, normal S1/S2, no MRG, JVP wnl  Lungs: +bibasilar crackles  Abd: soft non tender, non distended, normal bowel sounds, no hepatosplenomegaly  Ext: 2+ pulses B/L, no clubbing, cyanosis, edema  Skin: warm, dry, no rashes noted  Neuro: CN 2-12 grossly intact, UE/LE motor 5/5, sensation intact  Psych: conversational, responsive      Laboratory:  Most Recent Data:  CBC:   Lab Results   Component Value Date    WBC 3.54 (L) 2022    HGB 9.6 (L)  08/26/2022    HCT 30.0 (L) 08/26/2022     08/26/2022    MCV 84 08/26/2022    RDW 21.8 (H) 08/26/2022     BMP:   Lab Results   Component Value Date     (L) 08/29/2022    K 3.5 08/29/2022    CL 90 (L) 08/29/2022    CO2 27 08/29/2022    BUN 13 08/29/2022    CREATININE 0.7 08/29/2022    GLU 95 08/29/2022    CALCIUM 7.9 (L) 08/29/2022    MG 1.9 08/26/2022    PHOS 3.3 08/26/2022     Urinalysis:   Lab Results   Component Value Date    LABURIN ENTEROBACTER CLOACAE  10,000 - 49,999 cfu/ml   (A) 08/01/2022    LABURIN (A) 08/01/2022     KLEBSIELLA OXYTOCA  10,000 - 49,999 cfu/ml  No other significant isolate      COLORU Yellow 08/28/2022    CLARITYU Cloudy 12/16/2021    SPECGRAV 1.010 08/28/2022    NITRITE Negative 08/28/2022    KETONESU Negative 08/28/2022    UROBILINOGEN Negative 08/28/2022    WBCUA 0 08/18/2022       Radiology:  X-Ray Chest AP Portable   Final Result      Cardiomegaly and findings suggestive of pulmonary edema/CHF although correlation for infectious process advised.         Electronically signed by: Lucy Tovar MD   Date:    08/26/2022   Time:    03:21           Assessment and Plan:      Odette Hart is a 66 y.o.female    Hyponatremia  - does have history of chronic hyponatremia with baseline around 129  - On 8/27 noted to have worsening level to 125. Held Lasix and repeated NA level and was up to 131 but suspect labs error and its stagnant at 127  - continue lasix.    -will dose samsca X1 for persistent <NA despite water restriction/lasix.  -trend    Acute on chronic systolic heart failure  - severely reduced EF of 10% earlier this month  - Cardio following; continue diuresis with Lasix  -BP too soft for ACE/ARB.    Hypertension  - Remains on CoReg  - as above  -may need midodrine or Milrinone.     Normocytic Anemia:  -Check iron/folate/b12.        Thank you for allowing us to participate in taking care of your patient. Please call us if you have any questions regarding this consult.

## 2022-08-29 NOTE — PROGRESS NOTES
"[facility] Medicine  Progress Note    Patient Name: Odette Hart  MRN: [id number]  Patient Class: IP- Inpatient   Admission Date: 8/26/2022  Length of Stay: 3 days  Attending Physician: Nghia Hunt MD  Primary Care Provider: Braxton Barragan MD        Subjective:     Principal Problem:Acute on chronic systolic heart failure        HPI:  Per Michael Long, NP:  "The patient is a 66 y.o. female with a past medical history of MI, HLD, CAD, cardiomyopathy, HTN, and CHF who presents for evaluation of shortness of breath onset earlier today. She reports "I've been feeling bad," endorsing associated symptoms of nausea and weakness. She states she has been near vomiting but denies any vomiting episodes. She reports elevated temperature at 99 F last night. She states her current presentations feel similar to her previous hospitalizations for dyspnea. Pt is regularly followed by cardiology. She denies pain elsewhere and other somatic complaints.  BNP elevated to greater than 3000 with pulmonary edema noted on CXR.  She will be admitted for further management of her acute on chronic systolic congestive heart failure."          Interval History:  Breathing continues to improve. Walked with therapy today and did not require O2.    Review of Systems   Constitutional: Negative.  Negative for appetite change, chills and fever.   Respiratory:  Positive for shortness of breath. Negative for cough and wheezing.    Cardiovascular:  Negative for chest pain, palpitations and leg swelling.   Gastrointestinal:  Negative for abdominal pain, constipation, diarrhea, nausea and vomiting.   Genitourinary:  Negative for difficulty urinating.   Objective:     Vital Signs (Most Recent):  Temp: 98.5 °F (36.9 °C) (08/29/22 0902)  Pulse: 80 (08/29/22 1000)  Resp: 18 (08/29/22 0902)  BP: (!) 100/55 (08/29/22 0904)  SpO2: 96 % (08/29/22 0902)   Vital Signs (24h Range):  Temp:  [98.3 °F (36.8 °C)-98.9 °F (37.2 °C)] " 98.5 °F (36.9 °C)  Pulse:  [69-85] 80  Resp:  [15-20] 18  SpO2:  [96 %-100 %] 96 %  BP: ()/(50-55) 100/55     Weight: 53.7 kg (118 lb 6.2 oz)  Body mass index is 20.97 kg/m².    Intake/Output Summary (Last 24 hours) at 8/29/2022 1104  Last data filed at 8/29/2022 0500  Gross per 24 hour   Intake 480 ml   Output 2100 ml   Net -1620 ml      Physical Exam  Vitals and nursing note reviewed.   Constitutional:       General: She is not in acute distress.     Appearance: Normal appearance.   Cardiovascular:      Rate and Rhythm: Normal rate and regular rhythm.      Pulses: Normal pulses.      Heart sounds: Normal heart sounds. No murmur heard.  Pulmonary:      Effort: Pulmonary effort is normal.      Breath sounds: Rales present. No wheezing.   Abdominal:      General: Bowel sounds are normal. There is no distension.      Palpations: Abdomen is soft.      Tenderness: There is no abdominal tenderness.      Comments: +colostomy   Skin:     General: Skin is warm and dry.   Neurological:      General: No focal deficit present.      Mental Status: She is alert and oriented to person, place, and time. Mental status is at baseline.   Psychiatric:         Behavior: Behavior normal.         Thought Content: Thought content normal.       Significant Labs: All pertinent labs within the past 24 hours have been reviewed.  CBC:   No results for input(s): WBC, HGB, HCT, PLT in the last 48 hours.    CMP:   Recent Labs   Lab 08/27/22  1828 08/28/22  0402 08/29/22  0517   * 127* 127*   K 3.6 3.3* 3.5   CL 93* 91* 90*   CO2 22* 24 27   * 130* 95   BUN 19 18 13   CREATININE 0.8 0.8 0.7   CALCIUM 8.1* 7.6* 7.9*   PROT  --   --  6.7   ALBUMIN  --   --  1.9*   BILITOT  --   --  1.1*   ALKPHOS  --   --  108   AST  --   --  43*   ALT  --   --  34   ANIONGAP 12 12 10     TSH:   Recent Labs   Lab 08/09/22  1044   TSH 1.682       Significant Imaging: I have reviewed all pertinent imaging results/findings within the past 24  hours.      Assessment/Plan:         Acute on chronic systolic heart failure  - BNP- 3698, pulmonary edema on CXR; EF 10% on 8/4  - Appreciate cardiology consult  - received 40mg IV Lasix BID initially, transitioned to once daily given sodium (see below)  - breathing improving but still with rales  - continue coreg, resume spironolactone today per cardiology     Hyponatremia  - Chronic with recent baseline of 129  - trended down to 125, but repeat 131 >>127>>127 so question lab accuracy.  - nephrology following  - continue lasix and trend BMP  - samsca x1 today for persistent hyponatremia despite water restriction/lasix per nephrology    Primary hypertension  - BP tends to run low  - Continue coreg with hold parameters     Familial hypercholesterolemia  - Continue Zetia    Weakness, debility  - PT/OT         VTE Risk Mitigation (From admission, onward)           Ordered     enoxaparin injection 40 mg  Daily         08/26/22 0325     IP VTE HIGH RISK PATIENT  Once         08/26/22 0325     Place sequential compression device  Until discontinued         08/26/22 0325                    Discharge Planning   SIVAN:      Code Status: Full Code   Is the patient medically ready for discharge?:     Reason for patient still in hospital (select all that apply): Patient trending condition and Treatment  Discharge Plan A: Home with family                  Nikki Mckinley PA-C  Department of Hospital Medicine   [facility] ([place])

## 2022-08-29 NOTE — ASSESSMENT & PLAN NOTE
- Chronic with recent baseline of 129  - trended down to 125, but repeat 131 >>127>>127 >>127 so question lab accuracy.  - nephrology following  - continue lasix and trend BMP  - samsca x1 today for persistent hyponatremia despite water restriction/lasix per nephrology, additional dose of tolvaptan 8/30

## 2022-08-29 NOTE — PT/OT/SLP EVAL
"Physical Therapy Evaluation    Patient Name:  Odette Hart   MRN:  [id number]    Recommendations:     Discharge Recommendations:  home health PT, home health OT   Discharge Equipment Recommendations: other (see comments) (TBD pending patient progress)   Barriers to discharge: Decreased caregiver support    Assessment:     Odette Hart is a 66 y.o. female admitted with a medical diagnosis of Acute on chronic systolic heart failure.  She presents with the following impairments/functional limitations:  weakness, impaired endurance, impaired self care skills, impaired functional mobility, gait instability, impaired balance, decreased lower extremity function, decreased safety awareness     Patient evaluated by PT and goals established. Patient able to ambulate 100 feet with CGA using the RW. Notable BLE weakness. Patient discharge recommendation home with home health physical therapy. Patient reports she will have assistance every evening and intermittently throughout the day from friends. Discharge DME TBD pending patient progress.  PT will continue to follow and progress as tolerated. Please see progress note for detailed plan of care and recommendations.    Rehab Prognosis: Good; patient would benefit from acute skilled PT services to address these deficits and reach maximum level of function.    Recent Surgery: * No surgery found *      Plan:     During this hospitalization, patient to be seen 4 x/week to address the identified rehab impairments via gait training, therapeutic activities, therapeutic exercises, neuromuscular re-education and progress toward the following goals:    Plan of Care Expires:  09/28/22    Subjective     Chief Complaint: "I am tired and really don't feel like getting up but I will"  Patient/Family Comments/goals: Patient agreeable to participate in physical therapy evaluation   Pain/Comfort:  Pain Rating 1: 0/10    Patients cultural, spiritual, Zoroastrian conflicts given the " current situation:      Living Environment:  Patient lives alone in one story home with 4 steps to enter. The patient reports being able to ambulate around home independently and will occasionally use either her RW or rollator. The patient reports her friends will come over every evening to assist with meals and cleaning.   The patient reports she is looking into getting her meals delivered to her.     Prior to admission, patients level of function was modified independent.  Equipment used at home: walker, rolling, rollator.   Upon discharge, patient will have assistance from female friend will assist in evening and male friend will assist intermittently throughout the day.    Objective:     Communicated with nursing prior to session.  Patient found sitting edge of bed reporting that she just walked back from the bathroom by herself without and assistive device with oxygen, colostomy, telemetry  upon PT entry to room.    General Precautions: Standard, fall   Orthopedic Precautions:N/A   Braces: N/A  Respiratory Status: Nasal cannula    Exams:  Cognition:   Patient is oriented to person, place, situation, time.  Pt follows approximately 80% of verbal commands.    Mood: cooperative.   Safety Awareness: fair, patient expresses that she does not understand why she needs to wait for staff to return to bed after using the restroom. Patient educated on fall risk and the importance of using the call button.   Musculoskeletal:  BMI: 20  Posture:  stooped shoulder and forward head posture   LE ROM/Strength:   R ROM: WNL  L ROM: WNL  R Strength:   Hip flexion: 3/5  Knee extension: 4/5  Dorsiflexion: 3/5   L Strength:   Hip flexion: 3/5  Knee extension: 4/5  Dorsiflexion: 3/5   Neuromuscular:  Sensation: Intact to light touch bilateral LEs.   Tone/Reflexes: No impairments identified with functional mobility. No formal testing performed.   Balance:   Static sitting: good  Static standing: good  Dynamic standing:  good  Visual-vestibular: No impairments identified with functional mobility. No formal testing performed.  Integument: Visible skin intact  Cardiopulmonary:  O2 Requirements: nasal canula   Edema: none noted      Functional Mobility:  Transfers:     Sit to Stand:  stand by assistance with rolling walker  Gait: Patient ambulated 100 feet using the rolling walker with CGA   Decreased speed, jan and velocity   Impaired BLE clearance due to decreased hip flexion strength and endurance   Balance: good    Therapeutic Activities and Exercises:   Patient educated on the purpose of physical therapy, discharge planning, and POC.   Patient reports she does not want to go to a skilled nursing facility and she only wants to go home as soon as possible.     AM-PAC 6 CLICK MOBILITY  Total Score:19     Patient left sitting edge of bed with all lines intact, call button in reach, bed alarm on, and nursing notified.    GOALS:   Multidisciplinary Problems       Physical Therapy Goals          Problem: Physical Therapy    Goal Priority Disciplines Outcome Goal Variances Interventions   Physical Therapy Goal     PT, PT/OT Ongoing, Progressing     Description: Goals to be met by: 2022    Patient will increase functional independence with mobility by performin. Sit<>stand with supervision with least restrictive AD.  2. Gait x 200 feet with SBA with least restrictive AD.  3. Ascend/descend 4 step(s) with least restrictive assistive device and CGA.                           History:     Past Medical History:   Diagnosis Date    Acute coronary syndrome     Acute exacerbation of chronic obstructive pulmonary disease (COPD) 3/23/2022    Allergy     Arthritis     Cardiomyopathy     CHF (congestive heart failure)     Coronary artery disease     Coronary artery disease of native artery of native heart with stable angina pectoris 2021: [facility]: Cath: LAD: Proximal stent patent. LCX: Proximal 80%. LCX: Dominant.  Moderate disease. LCX: HEVER 2.75 x 18 mm. Distal dissection. HEVER 2.75 x 22 mm.     Diverticulitis     Diverticulosis     Familial hypercholesterolemia 2022    Former smoker 2022    Heart attack     Heart disease     History of myocardial infarction 2022    Hyperlipidemia     Hypertension     Hypertension 2022    ICD (implantable cardioverter-defibrillator) in place     Non-ST elevation myocardial infarction (NSTEMI) 2019       Past Surgical History:   Procedure Laterality Date    APPENDECTOMY N/A 2022    Procedure: APPENDECTOMY;  Surgeon: Rafa Cardozo MD;  Location: [facility] OR 2ND FLR;  Service: Colon and Rectal;  Laterality: N/A;    ATRIAL CARDIAC PACEMAKER INSERTION      CECECTOMY N/A 2022    Procedure: EXCISION, CECUM;  Surgeon: Rafa Cardozo MD;  Location: [facility]ND FLR;  Service: Colon and Rectal;  Laterality: N/A;     SECTION      CHOLECYSTECTOMY N/A 2022    Procedure: CHOLECYSTECTOMY;  Surgeon: Doug Epstein MD;  Location: [facility] 2ND FLR;  Service: General;  Laterality: N/A;    COLONOSCOPY N/A 2022    Procedure: COLONOSCOPY;  Surgeon: Rafa Cardozo MD;  Location: [facility] FLR;  Service: Colon and Rectal;  Laterality: N/A;    COLOSTOMY  2022    Procedure: CREATION, COLOSTOMY;  Surgeon: Rafa Cardozo MD;  Location: [facility] 2ND FLR;  Service: Colon and Rectal;;    CORONARY STENT PLACEMENT      LEFT HEART CATHETERIZATION Left 2022    Procedure: CATHETERIZATION, HEART, LEFT;  Surgeon: Yohannes Cordova MD;  Location: [facility] CATH LAB;  Service: Cardiology;  Laterality: Left;       Time Tracking:     PT Received On: 22  PT Start Time: 934     PT Stop Time: 952  PT Total Time (min): 18 min     Billable Minutes: Evaluation 18      2022

## 2022-08-29 NOTE — PT/OT/SLP PROGRESS
Occupational Therapy   Treatment    Name: Odette Hart  MRN: [id number]  Admitting Diagnosis:  Acute on chronic systolic heart failure       Recommendations:     Discharge Recommendations: home health OT, home health PT (Home with assist from friends)  Discharge Equipment Recommendations:  bedside commode  Barriers to discharge:  Inaccessible home environment, Decreased caregiver support (Current level of function)    Assessment:     Odette Hart is a 66 y.o. female with a medical diagnosis of Acute on chronic systolic heart failure.  She presents alert and reporting that she is not able to get OOB for therapy session due to fatigue from extra fluid, participating in PT evaluation, and getting up to BSC several times already today due to taking Lasix.  Pt with limited activity tolerance for all functional tasks. Performance deficits affecting function are weakness, impaired endurance, impaired self care skills, impaired functional mobility, gait instability, impaired balance, decreased lower extremity function, decreased upper extremity function, decreased coordination, decreased safety awareness, impaired cardiopulmonary response to activity.     Rehab Prognosis:   Fair- ; patient would benefit from acute skilled OT services to address these deficits and reach maximum level of function.       Plan:     Patient to be seen 4 x/week to address the above listed problems via self-care/home management, therapeutic activities, therapeutic exercises  Plan of Care Expires: 09/25/22  Plan of Care Reviewed with: patient    Subjective     Pain/Comfort:  Pain Rating 1: 0/10 (Pt only having pain when rolling onto buttocks/sacral are)  Pain Rating Post-Intervention 1: 0/10    Objective:     Communicated with: nurseCruz, prior to session.  Patient found right sidelying with oxygen, colostomy, telemetry, peripheral IV upon OT entry to room.    General Precautions: Standard, fall, respiratory   Orthopedic  Precautions:N/A   Braces: N/A  Respiratory Status: Nasal cannula, flow 2 L/min     Occupational Performance:     Bed Mobility:    Scooting up in bed: SBA with OT operating controls on bed; cues for hand placement to assist with scooting to HOB, pt performed while in right sidelying due to sacral/buttock pain if lying supine.    Functional Mobility/Transfers:  Pt is using BSC but should have assist for safety.   Functional Mobility: Pt declined OOB during OT session.    Activities of Daily Living:  NT      AMPAC 6 Click ADL: 18    Treatment & Education:  Role of OT, POC, positioning when lying in bed with focus on having chest and head elevated and to have head at the top of the bed and not slumped down in bed, safety with BSC transfers and to call for assist using call button, discharge recs    Patient left right sidelying with all lines intact, call button in reach, bed alarm on, and nurseCruz notified    GOALS:   Multidisciplinary Problems       Occupational Therapy Goals          Problem: Occupational Therapy    Goal Priority Disciplines Outcome Interventions   Occupational Therapy Goal     OT, PT/OT     Description: Goals to be met by: 9/3/22    Patient will increase functional independence with ADLs by performing:    BSC/toilet transfers at SBA.  ToIleting at SBA.  LB dressing at SBA.  UB dressing at SBA.  Grooming sitting EOB at SBA.                         Time Tracking:     OT Date of Treatment: 08/29/22  OT Start Time: 1611  OT Stop Time: 1623  OT Total Time (min): 12 min    Billable Minutes:Self Care/Home Management 12    OT/FRIOLAN: OT          8/29/2022

## 2022-08-30 PROBLEM — I50.41 ACUTE COMBINED SYSTOLIC AND DIASTOLIC CONGESTIVE HEART FAILURE: Status: ACTIVE | Noted: 2022-01-01

## 2022-08-30 PROBLEM — Z51.5 ENCOUNTER FOR PALLIATIVE CARE: Status: ACTIVE | Noted: 2022-01-01

## 2022-08-30 PROBLEM — R06.02 SHORTNESS OF BREATH: Status: ACTIVE | Noted: 2022-01-01

## 2022-08-30 NOTE — PT/OT/SLP PROGRESS
Occupational Therapy      Patient Name:  Odette Hart   MRN:  [id number]    Patient not seen today secondary to Patient fatigue. Pt reporting she feels weak and fatigued.  Pt encouraged to participate in OT tx session but pt stating she couldn't due to how badly she feels.  Will follow-up 8/31/22.    8/30/2022

## 2022-08-30 NOTE — PT/OT/SLP PROGRESS
"Physical Therapy Treatment    Patient Name:  Odette Hart   MRN:  [id number]    Recommendations:     Discharge Recommendations:  home health PT, home health OT   Discharge Equipment Recommendations: other (see comments) (TBD pending patient progress)   Barriers to discharge: Decreased caregiver support    Assessment:     Odette Hart is a 66 y.o. female admitted with a medical diagnosis of Acute on chronic systolic heart failure.  She presents with the following impairments/functional limitations:  weakness, impaired endurance, impaired self care skills, impaired functional mobility, gait instability, impaired balance, decreased lower extremity function, decreased safety awareness .    Patient tolerated today's session very well. Patient ambulated 200 feet using the RW with CGA. Remainder of the session focused on seated LE strength exercises. The patient will benefit from continued physical therapy intervention to address functional mobility deficits.     Rehab Prognosis: Good; patient would benefit from acute skilled PT services to address these deficits and reach maximum level of function.    Recent Surgery: * No surgery found *      Plan:     During this hospitalization, patient to be seen 4 x/week to address the identified rehab impairments via gait training, therapeutic activities, therapeutic exercises, neuromuscular re-education and progress toward the following goals:    Plan of Care Expires:  09/28/22    Subjective     Chief Complaint: "I just feel so tired all the time, I never want to do anything"  Patient/Family Comments/goals: patient agreeable to participate in physical therapy intervention.   Pain/Comfort:  Pain Rating 1: 0/10      Objective:     Communicated with nursing prior to session.  Patient found supine with telemetry, colostomy upon PT entry to room.     General Precautions: Standard, fall   Orthopedic Precautions:N/A   Braces: N/A  Respiratory Status: Room air     Functional " Mobility:  Bed Mobility:     Rolling Left:  independence  Supine to Sit: stand by assistance  Transfers:     Sit to Stand:  stand by assistance with rolling walker  Gait: Patient ambulated 200 feet using the RW with CGA  Decreased jan, velocity and step length   Patient requires occasional standing rest breaks while ambulating  Verbal cues for use of the RW      AM-PAC 6 CLICK MOBILITY  Turning over in bed (including adjusting bedclothes, sheets and blankets)?: 4  Moving from lying on back to sitting on the side of the bed?: 4  Moving to and from a bed to a chair (including a wheelchair)?: 3  Need to walk in hospital room?: 3  Climbing 3-5 steps with a railing?: 3       Therapeutic Activities and Exercises:     Patient educated on the importance of performing LE strengthening exercises daily outside of therapy to improve functional mobility. The patient was educated on and performed 2 x 10 reps the following LE exercises:   Ankle pumps   Marches: decreased range of motion noted due to impaired strength   LAQ    Patient required multiple rest breaks throughout due to reports of significant fatigue     Patient left sitting edge of bed with all lines intact, call button in reach, and bed alarm on..    GOALS:   Multidisciplinary Problems       Physical Therapy Goals          Problem: Physical Therapy    Goal Priority Disciplines Outcome Goal Variances Interventions   Physical Therapy Goal     PT, PT/OT Ongoing, Progressing     Description: Goals to be met by: 2022    Patient will increase functional independence with mobility by performin. Sit<>stand with supervision with least restrictive AD.  2. Gait x 200 feet with SBA with least restrictive AD.  3. Ascend/descend 4 step(s) with least restrictive assistive device and CGA.                           Time Tracking:     PT Received On: 22  PT Start Time: 1152     PT Stop Time: 1220  PT Total Time (min): 28 min     Billable Minutes: Gait Training 19  and Therapeutic Exercise 9    Treatment Type: Treatment  PT/PTA: PT     PTA Visit Number: 0     08/30/2022

## 2022-08-30 NOTE — PLAN OF CARE
Problem: Fall Injury Risk  Goal: Absence of Fall and Fall-Related Injury  Outcome: Ongoing, Progressing     Problem: Skin Injury Risk Increased  Goal: Skin Health and Integrity  Outcome: Ongoing, Progressing     Problem: Impaired Wound Healing  Goal: Optimal Wound Healing  Outcome: Ongoing, Progressing     Problem: Adult Inpatient Plan of Care  Goal: Plan of Care Review  Outcome: Ongoing, Progressing  Goal: Patient-Specific Goal (Individualized)  Outcome: Ongoing, Progressing
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
## 2022-08-30 NOTE — CONSULTS
"[facility] ([facility])  Palliative Medicine  Consult Note    Patient Name: Odette Hart  MRN: [id number]  Admission Date: 8/26/2022  Hospital Length of Stay: 4 days  Code Status: Full Code   Attending Provider: Marco Antonio Alvarado MD  Consulting Provider: Layla Taylor DNP  Primary Care Physician: Braxton Barragan MD  Principal Problem:Acute on chronic systolic heart failure    Patient information was obtained from patient and primary team.      Inpatient consult to Palliative Care  Consult performed by: Layla Taylor DNP  Consult ordered by: Joseph Bean NP        Assessment/Plan:     * Acute on chronic systolic heart failure  8/4/22 Echo   The left ventricle is moderately enlarged with eccentric hypertrophy and severely decreased systolic function.   The estimated ejection fraction is 10%.   Grade II left ventricular diastolic dysfunction.   Moderate left atrial enlargement.   Moderate mitral regurgitation.   No obvious endocarditis   Normal right ventricular size with low normal right ventricular systolic function.   Mild to moderate tricuspid regurgitation.   Mild aortic regurgitation.   There are segmental left ventricular wall motion abnormalities. Akinetic apex. No thrombus.  Patient with end stage HF, contributes to overall prognosis of 6 months or less    Encounter for palliative care  - Consult for end of life including hospice discussion in patient with several chronic illnesses including CHF ( EF 10%) and COPD. Chart reviewed in depth and discussed with Joseph Bean who met with patient this AM and discussed home based pall care.  - Met patient at bedside, she was alert and awake, however reports feeling very fatigued. She is familiar with our service as Dr. Morales has seen her in the past. Upon entering the room she stated "my heart doctor said I am not ready for palliative care". We discussed at length the services she would receive with home based pall care " "including symptom management and improving quality of life. I also discussed with her that this would be in addition to following up with her doctors, including her heart doctor, and coming back to the hospital. She reports she is hopeful doctors are able to "get heart to improve with medication". She has two sons (Telly lives locally, whereas Abelardo lives in [place]).  Her preferred MPOA is her son Abelardo Ortiz ([phone]); can complete MPOA paperwork prior to discharge. She reports she has 4 grandchildren, 1 lives locally who she is close to. She reports that while her son is of assistance he works a lot. She has many friends who she relies on for help, including one who takes her to medical appointments. She reports that she was recently hospitalized and did well at home, ambulating well, however she has progressively become weaker and more tired. She reports she sleeps most of the day (falling asleep at times during our conversation). She reports she is indepdent of ADLs however this is becoming increasingly difficult. She was driving but lately has not felt up to driving due to low blood pressure and being so tired.   - She is very clear that her goals are to medically manage her HF and to maintain her current health.   - Patient reports she has outpt physical therapy but this was discontinued due to "it being too intense for my heart". Patient is open to home health.   - I did introduce code status, patient reports that while she understands this is a very important topic, she has never thought about this and wants to think and discuss this with her son. She wants to discuss her wishes so she does not put any decisions on him.  - Recommend home-based palliative medicine upon discharge.         Debility  - PT/OT evaluated patient, recommended 24/7 assist at home     Hyponatremia  - Nephrology consulted and managing         Thank you for your consult.     Subjective:     HPI:   Per H&P: "HPI: The patient is a 66 " "y.o. female with a past medical history of MI, HLD, CAD, cardiomyopathy, HTN, and CHF who presents for evaluation of shortness of breath onset earlier today. She reports "I've been feeling bad," endorsing associated symptoms of nausea and weakness. She states she has been near vomiting but denies any vomiting episodes. She reports elevated temperature at 99 F last night. She states her current presentations feel similar to her previous hospitalizations for dyspnea. Pt is regularly followed by cardiology. She denies pain elsewhere and other somatic complaints.  BNP elevated to greater than 3000 with pulmonary edema noted on CXR.  She will be admitted for further management of her acute on chronic systolic congestive heart failure."    At time of initial consult, patient lying in bed alert and awake. Palliative medicine consulted for end of life including hospice discussion.         Hospital Course:  No notes on file    Interval History: Patient lying in bed, reports fatigue and sleeping more.     Past Medical History:   Diagnosis Date    Acute coronary syndrome     Acute exacerbation of chronic obstructive pulmonary disease (COPD) 3/23/2022    Allergy     Arthritis     Cardiomyopathy     CHF (congestive heart failure)     Coronary artery disease     Coronary artery disease of native artery of native heart with stable angina pectoris 4/19/2021 1/24/2022: [facility]: Cath: LAD: Proximal stent patent. LCX: Proximal 80%. LCX: Dominant. Moderate disease. LCX: HEVER 2.75 x 18 mm. Distal dissection. HEVER 2.75 x 22 mm.     Diverticulitis     Diverticulosis     Familial hypercholesterolemia 1/22/2022    Former smoker 1/24/2022    Heart attack     Heart disease     History of myocardial infarction 1/24/2022    Hyperlipidemia     Hypertension     Hypertension 1/24/2022    ICD (implantable cardioverter-defibrillator) in place     Non-ST elevation myocardial infarction (NSTEMI) 2/4/2019       Past Surgical History: "   Procedure Laterality Date    APPENDECTOMY N/A 2022    Procedure: APPENDECTOMY;  Surgeon: Rafa Cardozo MD;  Location: [facility] OR 2ND FLR;  Service: Colon and Rectal;  Laterality: N/A;    ATRIAL CARDIAC PACEMAKER INSERTION      CECECTOMY N/A 2022    Procedure: EXCISION, CECUM;  Surgeon: Rafa Cardozo MD;  Location: [facility] OR [facility] FLR;  Service: Colon and Rectal;  Laterality: N/A;     SECTION      CHOLECYSTECTOMY N/A 2022    Procedure: CHOLECYSTECTOMY;  Surgeon: Doug Epstein MD;  Location: [facility] OR 2ND FLR;  Service: General;  Laterality: N/A;    COLONOSCOPY N/A 2022    Procedure: COLONOSCOPY;  Surgeon: Rafa Cardozo MD;  Location: [facility] OR [facility] FLR;  Service: Colon and Rectal;  Laterality: N/A;    COLOSTOMY  2022    Procedure: CREATION, COLOSTOMY;  Surgeon: Rafa Cardozo MD;  [facility];  Service: Colon and Rectal;;    CORONARY STENT PLACEMENT      LEFT HEART CATHETERIZATION Left 2022    Procedure: CATHETERIZATION, HEART, LEFT;  Surgeon: Yohannes Cordova MD;  Location: [facility] CATH LAB;  Service: Cardiology;  Laterality: Left;       Review of patient's allergies indicates:   Allergen Reactions    Augmentin [amoxicillin-pot clavulanate] Swelling     Not allergic to amoxicillin just a derivative of augmentin    Lisinopril Other (See Comments)     Fluid around heart    Losartan Other (See Comments)     High potassium    Shellfish containing products Anaphylaxis     Pt.states she is allergic to SEAFOOD since the age of 13    Levaquin [levofloxacin] Other (See Comments)     Very bad joint pain    Clindamycin Palpitations     Chest pain       Medications:  Continuous Infusions:  Scheduled Meds:   aspirin  81 mg Oral Daily    carvediloL  3.125 mg Oral BID    clopidogreL  75 mg Oral Daily    docusate sodium  100 mg Oral BID    enoxaparin  40 mg Subcutaneous Daily    ezetimibe  10 mg Oral QHS    midodrine  2.5 mg Oral BID WM     pantoprazole  40 mg Oral Daily     PRN Meds:ALPRAZolam, ondansetron, simethicone, sodium chloride 0.9%, traMADoL    Family History       Problem Relation (Age of Onset)    Emphysema Father    Heart attack Brother    Heart disease Mother          Tobacco Use    Smoking status: Former     Packs/day: 0.50     Types: Cigarettes     Start date: 1976     Quit date: 2012     Years since quittin.7    Smokeless tobacco: Never   Substance and Sexual Activity    Alcohol use: No    Drug use: No    Sexual activity: Not on file       Review of Systems   Constitutional:  Positive for activity change and fatigue.   Respiratory:  Positive for shortness of breath. Negative for cough.    Cardiovascular:  Negative for chest pain.   Objective:     Vital Signs (Most Recent):  Temp: 98.4 °F (36.9 °C) (22 1118)  Pulse: 70 (22 1400)  Resp: 18 (22 1442)  BP: (!) 95/54 (22 1118)  SpO2: 98 % (22 1118)   Vital Signs (24h Range):  Temp:  [98 °F (36.7 °C)-98.4 °F (36.9 °C)] 98.4 °F (36.9 °C)  Pulse:  [70-90] 70  Resp:  [18] 18  SpO2:  [95 %-100 %] 98 %  BP: ()/(49-78) 95/54     Weight: 53.7 kg (118 lb 6.2 oz)  Body mass index is 20.97 kg/m².    Physical Exam  Cardiovascular:      Rate and Rhythm: Normal rate.   Pulmonary:      Effort: No respiratory distress.   Neurological:      Mental Status: She is alert and oriented to person, place, and time.       Review of Symptoms      Symptom Assessment (ESAS 0-10 Scale)  Fatigue:  5         Living Arrangements:  Lives alone      Advance Care Planning   Advance Directives:     Decision Making:  Patient answered questions       Significant Labs: All pertinent labs within the past 24 hours have been reviewed.  CBC:   Recent Labs   Lab 22  0202   WBC 3.54*   HGB 9.6*   HCT 30.0*   MCV 84        BMP:  Recent Labs   Lab 22  0551 22  0937   *  --    *  127*  --    K 3.8  --    CL 91*  --    CO2 28  --    BUN 11  --     CREATININE 0.7  --    CALCIUM 8.1*  --    MG  --  1.8     LFT:  Lab Results   Component Value Date    AST 43 (H) 08/29/2022    ALKPHOS 108 08/29/2022    BILITOT 1.1 (H) 08/29/2022     Albumin:   Albumin   Date Value Ref Range Status   08/29/2022 1.9 (L) 3.5 - 5.2 g/dL Final     Protein:   Total Protein   Date Value Ref Range Status   08/29/2022 6.7 6.0 - 8.4 g/dL Final     Lactic acid:   Lab Results   Component Value Date    LACTATE 1.4 08/18/2022    LACTATE 2.3 (H) 08/18/2022       Significant Imaging: I have reviewed all pertinent imaging results/findings within the past 24 hours.        > 50% of 70 min visit spent in chart review, face to face discussion of goals of care,  symptom assessment, coordination of care and emotional support.    Layla Taylor, JUNG  Palliative Medicine  [facility])

## 2022-08-30 NOTE — HOSPITAL COURSE
Patient with CAD s/p MI with known CM with Chronic systolic and diastolic CHF who presented with CHF.  Followed by Freddy Arenas MD at [facility].  BNP was 3698 and CXR with pulmonary edema on admission.  Repeat TTE on 6/4/2022 with EF down to 10% and Grade II DD with low normal RVSF.  Started on Lasix for diuresis, but course complicated with hypotension and hyponatremia.  Patient with chronic hyponatremia with recent baseline of 129 and likely Cardiorenal in origin.  Followed by Cardiology and Nephrology.  For her Hyponatremia, she received Tolvaptan and now on twice a day and Na stable at 132.  Her blood pressure slowly improved (required intermittent Midodrine) and Metoprolol XL added, as well as Losartan with SBP in 90s and stable.  She as adequately diuresed and off Oxygen.  She has worked with PT/OT and recommended home PT/OT - patient is declining this.  She has 24 hour family support.  She is a high risk for falling and this was discussed in detail with patient - she continues to decline SNF and limited options for HH where she lives.  She will continue/resume previous wound care as outpatient.

## 2022-08-30 NOTE — SUBJECTIVE & OBJECTIVE
Interval History: Patient lying in bed, reports fatigue and sleeping more.     Past Medical History:   Diagnosis Date    Acute coronary syndrome     Acute exacerbation of chronic obstructive pulmonary disease (COPD) 3/23/2022    Allergy     Arthritis     Cardiomyopathy     CHF (congestive heart failure)     Coronary artery disease     Coronary artery disease of native artery of native heart with stable angina pectoris 2021: [facility]: Cath: LAD: Proximal stent patent. LCX: Proximal 80%. LCX: Dominant. Moderate disease. LCX: HEVER 2.75 x 18 mm. Distal dissection. HEVER 2.75 x 22 mm.     Diverticulitis     Diverticulosis     Familial hypercholesterolemia 2022    Former smoker 2022    Heart attack     Heart disease     History of myocardial infarction 2022    Hyperlipidemia     Hypertension     Hypertension 2022    ICD (implantable cardioverter-defibrillator) in place     Non-ST elevation myocardial infarction (NSTEMI) 2019       Past Surgical History:   Procedure Laterality Date    APPENDECTOMY N/A 2022    Procedure: APPENDECTOMY;  Surgeon: Rafa Cardozo MD;  [facility];  Service: Colon and Rectal;  Laterality: N/A;    ATRIAL CARDIAC PACEMAKER INSERTION      CECECTOMY N/A 2022    Procedure: EXCISION, CECUM;  Surgeon: Rafa Cardozo MD;  Location: [facility] OR 2ND FLR;  Service: Colon and Rectal;  Laterality: N/A;     SECTION      CHOLECYSTECTOMY N/A 2022    Procedure: CHOLECYSTECTOMY;  Surgeon: Doug Epstein MD;  [facility];  Service: General;  Laterality: N/A;    COLONOSCOPY N/A 2022    Procedure: COLONOSCOPY;  Surgeon: Rafa Cardozo MD;  Location: [facility] OR 2ND FLR;  Service: Colon and Rectal;  Laterality: N/A;    COLOSTOMY  2022    Procedure: CREATION, COLOSTOMY;  Surgeon: Rafa Cardozo MD;  [facility];  Service: Colon and Rectal;;    CORONARY STENT PLACEMENT      LEFT HEART CATHETERIZATION  Left 2022    Procedure: CATHETERIZATION, HEART, LEFT;  Surgeon: Yohannes Cordova MD;  Location: [facility] CATH LAB;  Service: Cardiology;  Laterality: Left;       Review of patient's allergies indicates:   Allergen Reactions    Augmentin [amoxicillin-pot clavulanate] Swelling     Not allergic to amoxicillin just a derivative of augmentin    Lisinopril Other (See Comments)     Fluid around heart    Losartan Other (See Comments)     High potassium    Shellfish containing products Anaphylaxis     Pt.states she is allergic to SEAFOOD since the age of 13    Levaquin [levofloxacin] Other (See Comments)     Very bad joint pain    Clindamycin Palpitations     Chest pain       Medications:  Continuous Infusions:  Scheduled Meds:   aspirin  81 mg Oral Daily    carvediloL  3.125 mg Oral BID    clopidogreL  75 mg Oral Daily    docusate sodium  100 mg Oral BID    enoxaparin  40 mg Subcutaneous Daily    ezetimibe  10 mg Oral QHS    midodrine  2.5 mg Oral BID WM    pantoprazole  40 mg Oral Daily     PRN Meds:ALPRAZolam, ondansetron, simethicone, sodium chloride 0.9%, traMADoL    Family History       Problem Relation (Age of Onset)    Emphysema Father    Heart attack Brother    Heart disease Mother          Tobacco Use    Smoking status: Former     Packs/day: 0.50     Types: Cigarettes     Start date: 1976     Quit date: 2012     Years since quittin.7    Smokeless tobacco: Never   Substance and Sexual Activity    Alcohol use: No    Drug use: No    Sexual activity: Not on file       Review of Systems   Constitutional:  Positive for activity change and fatigue.   Respiratory:  Positive for shortness of breath. Negative for cough.    Cardiovascular:  Negative for chest pain.   Objective:     Vital Signs (Most Recent):  Temp: 98.4 °F (36.9 °C) (22 1118)  Pulse: 70 (22 1400)  Resp: 18 (22 1442)  BP: (!) 95/54 (22 1118)  SpO2: 98 % (22 1118)   Vital Signs (24h Range):  Temp:  [98 °F (36.7  °C)-98.4 °F (36.9 °C)] 98.4 °F (36.9 °C)  Pulse:  [70-90] 70  Resp:  [18] 18  SpO2:  [95 %-100 %] 98 %  BP: ()/(49-78) 95/54     Weight: 53.7 kg (118 lb 6.2 oz)  Body mass index is 20.97 kg/m².    Physical Exam  Cardiovascular:      Rate and Rhythm: Normal rate.   Pulmonary:      Effort: No respiratory distress.   Neurological:      Mental Status: She is alert and oriented to person, place, and time.       Review of Symptoms      Symptom Assessment (ESAS 0-10 Scale)  Fatigue:  5         Living Arrangements:  Lives alone      Advance Care Planning   Advance Directives:     Decision Making:  Patient answered questions       Significant Labs: All pertinent labs within the past 24 hours have been reviewed.  CBC:   Recent Labs   Lab 08/26/22  0202   WBC 3.54*   HGB 9.6*   HCT 30.0*   MCV 84        BMP:  Recent Labs   Lab 08/30/22  0551 08/30/22  0937   *  --    *  127*  --    K 3.8  --    CL 91*  --    CO2 28  --    BUN 11  --    CREATININE 0.7  --    CALCIUM 8.1*  --    MG  --  1.8     LFT:  Lab Results   Component Value Date    AST 43 (H) 08/29/2022    ALKPHOS 108 08/29/2022    BILITOT 1.1 (H) 08/29/2022     Albumin:   Albumin   Date Value Ref Range Status   08/29/2022 1.9 (L) 3.5 - 5.2 g/dL Final     Protein:   Total Protein   Date Value Ref Range Status   08/29/2022 6.7 6.0 - 8.4 g/dL Final     Lactic acid:   Lab Results   Component Value Date    LACTATE 1.4 08/18/2022    LACTATE 2.3 (H) 08/18/2022       Significant Imaging: I have reviewed all pertinent imaging results/findings within the past 24 hours.

## 2022-08-30 NOTE — HPI
"Per H&P: "HPI: The patient is a 66 y.o. female with a past medical history of MI, HLD, CAD, cardiomyopathy, HTN, and CHF who presents for evaluation of shortness of breath onset earlier today. She reports "I've been feeling bad," endorsing associated symptoms of nausea and weakness. She states she has been near vomiting but denies any vomiting episodes. She reports elevated temperature at 99 F last night. She states her current presentations feel similar to her previous hospitalizations for dyspnea. Pt is regularly followed by cardiology. She denies pain elsewhere and other somatic complaints.  BNP elevated to greater than 3000 with pulmonary edema noted on CXR.  She will be admitted for further management of her acute on chronic systolic congestive heart failure."    At time of initial consult, patient lying in bed alert and awake. Palliative medicine consulted for end of life including hospice discussion.     "

## 2022-08-30 NOTE — ASSESSMENT & PLAN NOTE
8/4/22 Echo   The left ventricle is moderately enlarged with eccentric hypertrophy and severely decreased systolic function.   The estimated ejection fraction is 10%.   Grade II left ventricular diastolic dysfunction.   Moderate left atrial enlargement.   Moderate mitral regurgitation.   No obvious endocarditis   Normal right ventricular size with low normal right ventricular systolic function.   Mild to moderate tricuspid regurgitation.   Mild aortic regurgitation.   There are segmental left ventricular wall motion abnormalities. Akinetic apex. No thrombus.  Patient with end stage HF, contributes to overall prognosis of 6 months or less

## 2022-08-30 NOTE — PROGRESS NOTES
"[facility] Medicine  Progress Note    Patient Name: Odette Hart  MRN: [id number]  Patient Class: IP- Inpatient   Admission Date: 8/26/2022  Length of Stay: 4 days  Attending Physician: Marco Antonio Alvarado MD  Primary Care Provider: Braxton Barragan MD        Subjective:     Principal Problem:Acute on chronic systolic heart failure        HPI:  Per Michael Long, NP:  "The patient is a 66 y.o. female with a past medical history of MI, HLD, CAD, cardiomyopathy, HTN, and CHF who presents for evaluation of shortness of breath onset earlier today. She reports "I've been feeling bad," endorsing associated symptoms of nausea and weakness. She states she has been near vomiting but denies any vomiting episodes. She reports elevated temperature at 99 F last night. She states her current presentations feel similar to her previous hospitalizations for dyspnea. Pt is regularly followed by cardiology. She denies pain elsewhere and other somatic complaints.  BNP elevated to greater than 3000 with pulmonary edema noted on CXR.  She will be admitted for further management of her acute on chronic systolic congestive heart failure."      Overview/Hospital Course:  Odette Hart was admitted for CHF exacerbation. Diuresing with IVP lasix, cardiology following along. During stay her chronic hyponatremia worsened, nephrology consulted. Holding lasix, giving tolvaptan. Given her advanced heart failure, palliative care consulted      Interval History:  Reports improvement in  shortness of breath, biggest complaint is lethargy. Discussed Na 127, holding lasix and tolvaptan x 1 per nephrology. Palliative care consulted    Review of Systems   Constitutional:  Negative for chills and fever.   Respiratory:  Positive for shortness of breath. Negative for cough and wheezing.    Cardiovascular:  Negative for chest pain, palpitations and leg swelling.   Gastrointestinal:  Negative for abdominal pain, " constipation, diarrhea, nausea and vomiting.   Genitourinary:  Negative for difficulty urinating.   Musculoskeletal:  Negative for arthralgias and gait problem.   Neurological:  Positive for weakness.   Psychiatric/Behavioral:  Negative for agitation and confusion.    Objective:     Vital Signs (Most Recent):  Temp: 98.5 °F (36.9 °C) (08/29/22 0902)  Pulse: 80 (08/29/22 1000)  Resp: 18 (08/29/22 0902)  BP: (!) 100/55 (08/29/22 0904)  SpO2: 96 % (08/29/22 0902)   Vital Signs (24h Range):  Temp:  [98.3 °F (36.8 °C)-98.9 °F (37.2 °C)] 98.5 °F (36.9 °C)  Pulse:  [69-85] 80  Resp:  [15-20] 18  SpO2:  [96 %-100 %] 96 %  BP: ()/(50-55) 100/55     Weight: 53.7 kg (118 lb 6.2 oz)  Body mass index is 20.97 kg/m².    Intake/Output Summary (Last 24 hours) at 8/29/2022 1104  Last data filed at 8/29/2022 0500  Gross per 24 hour   Intake 480 ml   Output 2100 ml   Net -1620 ml      Physical Exam  Vitals and nursing note reviewed.   Constitutional:       General: She is not in acute distress.     Appearance: Normal appearance.   Cardiovascular:      Rate and Rhythm: Normal rate and regular rhythm.      Pulses: Normal pulses.      Heart sounds: Normal heart sounds. No murmur heard.  Pulmonary:      Effort: Pulmonary effort is normal. No respiratory distress.      Breath sounds: No wheezing or rales.   Abdominal:      General: Abdomen is flat. Bowel sounds are normal. There is no distension.      Palpations: Abdomen is soft.      Tenderness: There is no abdominal tenderness.      Comments: +colostomy   Skin:     General: Skin is warm and dry.   Neurological:      General: No focal deficit present.      Mental Status: She is alert and oriented to person, place, and time. Mental status is at baseline.   Psychiatric:         Behavior: Behavior normal.         Thought Content: Thought content normal.       Significant Labs: All pertinent labs within the past 24 hours have been reviewed.  CBC:   No results for input(s): WBC, HGB,  HCT, PLT in the last 48 hours.    CMP:   Recent Labs   Lab 08/27/22  1828 08/28/22  0402 08/29/22  0517   * 127* 127*   K 3.6 3.3* 3.5   CL 93* 91* 90*   CO2 22* 24 27   * 130* 95   BUN 19 18 13   CREATININE 0.8 0.8 0.7   CALCIUM 8.1* 7.6* 7.9*   PROT  --   --  6.7   ALBUMIN  --   --  1.9*   BILITOT  --   --  1.1*   ALKPHOS  --   --  108   AST  --   --  43*   ALT  --   --  34   ANIONGAP 12 12 10     TSH:   Recent Labs   Lab 08/09/22  1044   TSH 1.682       Significant Imaging: I have reviewed all pertinent imaging results/findings within the past 24 hours.      Assessment/Plan:      * Acute on chronic systolic heart failure  - BNP- 3698, pulmonary edema on CXR; EF 10% on 8/4  - Appreciate cardiology consult  - received 40mg IV Lasix BID initially, transitioned to once daily given sodium (see below)  - breathing improving but still with rales  - continue coreg, resume spironolactone today per cardiology      Debility  - PT/OT       Hyponatremia  - Chronic with recent baseline of 129  - trended down to 125, but repeat 131 >>127>>127 >>127 so question lab accuracy.  - nephrology following  - continue lasix and trend BMP  - samsca x1 today for persistent hyponatremia despite water restriction/lasix per nephrology, additional dose of tolvaptan 8/30      Primary hypertension  - BP tends to run low  - Continue coreg with hold parameters      Familial hypercholesterolemia  Continue Zetia        VTE Risk Mitigation (From admission, onward)         Ordered     enoxaparin injection 40 mg  Daily         08/26/22 0325     IP VTE HIGH RISK PATIENT  Once         08/26/22 0325     Place sequential compression device  Until discontinued         08/26/22 0325                Discharge Planning   SIVAN:      Code Status: Full Code   Is the patient medically ready for discharge?:     Reason for patient still in hospital (select all that apply): Patient trending condition, Treatment, Consult recommendations and Pending  disposition  Discharge Plan A: Home with family                  Chelsea Meza PA-C  Department of Hospital Medicine   [facility] ([facility])

## 2022-08-30 NOTE — PROGRESS NOTES
[facility] Nephrology Progress Note    Date of Admit: 2022    Chief Complaint/Reason for Consult     Shortness of Breath (Pt c/o difficulty breathing with associated weakness and nausea.  Pmh of CHF)    Reason for consult: Hyponatremia    Subjective:      History of Present Illness:  Odette Hart is a 66 y.o. female who  presented to [facility] on 22 with worsening shortness of breath. Has history of CHFrEF and has had multiple hospitalizations for dyspnea. BNP > 3k on presentation and pulmonary edema noted on CXR. EF of 10% on last Echo. Patient diuresed with IV Lasix but noted to have worsening hyponatremia prompting nephrology eval.    Interval history:   Sitting on bedside feeling a little better.  Open to home palliative care program now.  Labs noted with labile NA.  Soft BP's remain.       Review of Systems:  Pertinent positives and negatives are listed in HPI.  All other systems are reviewed and are negative.     Objective:   Last 24 Hour Vital Signs:  BP  Min: 91/54  Max: 103/56  Temp  Av.2 °F (36.8 °C)  Min: 98 °F (36.7 °C)  Max: 98.7 °F (37.1 °C)  Pulse  Av.5  Min: 71  Max: 90  Resp  Av  Min: 18  Max: 18  SpO2  Av.8 %  Min: 95 %  Max: 100 %  Body mass index is 20.97 kg/m².  I/O last 3 completed shifts:  In: 458 [P.O.:458]  Out: 3451 [Urine:3451]    Physical Examination:  Gen: NAD, AAOx3  HEENT: NCAT, EOMI, PERRL, MM, pink conjunctiva  Neck: no thyroidmegaly, no LAD  Cardio: RRR, normal S1/S2, no MRG, JVP wnl  Lungs: +bibasilar crackles improving.  Abd: soft non tender, non distended, normal bowel sounds, no hepatosplenomegaly  Ext: 2+ pulses B/L, no clubbing, cyanosis, edema  Skin: warm, dry, no rashes noted  Neuro: CN 2-12 grossly intact, UE/LE motor 5/5, sensation intact  Psych: conversational, responsive      Laboratory:  Most Recent Data:    Recent Labs   Lab 22  0202 22  0415 22  0551   *   < > 127*  127*   K 4.7   < > 3.8   CL 98    < > 91*   CO2 21*   < > 28   BUN 23   < > 11   CREATININE 0.9   < > 0.7   CALCIUM 8.2*   < > 8.1*   PHOS 3.3  --   --     < > = values in this interval not displayed.           Radiology:  X-Ray Chest AP Portable   Final Result      Cardiomegaly and findings suggestive of pulmonary edema/CHF although correlation for infectious process advised.         Electronically signed by: Lucy Tovar MD   Date:    08/26/2022   Time:    03:21           Assessment and Plan:      Odette Hart is a 66 y.o.female    Hyponatremia  - does have history of chronic hyponatremia with baseline around 129  - On 8/27 noted to have worsening level to 125. Held Lasix and repeated NA level and was up to 131 but suspect labs error and its stagnant at 127  - dosed samsca X1 for persistent <NA despite water restriction/lasix with initial improvement and now dropping again.  -hold lasix/danielito today.  Redose samsca.  -trend    Acute on chronic systolic heart failure  - severely reduced EF of 10% earlier this month  - Cardio following; continue diuresis with Lasix  -BP too soft for ACE/ARB.  -see below.    Hypertension  - Remains on CoReg  -as above  -remains low and will add low dose midodrine.     Normocytic Anemia:  -RIGO noted.  -dose venofer.    End stage HF as above:  -appropriate for home based palliative care and pt now open to it.  -consult placed.       See above

## 2022-08-30 NOTE — ASSESSMENT & PLAN NOTE
"- Consult for end of life including hospice discussion in patient with several chronic illnesses including CHF ( EF 10%) and COPD. Chart reviewed in depth and discussed with Joseph Bean who met with patient this AM and discussed home based pall care.  - Met patient at bedside, she was alert and awake, however reports feeling very fatigued. She is familiar with our service as Dr. Morales has seen her in the past. Upon entering the room she stated "my heart doctor said I am not ready for palliative care". We discussed at length the services she would receive with home based pall care including symptom management and improving quality of life. I also discussed with her that this would be in addition to following up with her doctors, including her heart doctor, and coming back to the hospital. She reports she is hopeful doctors are able to "get heart to improve with medication". She has two sons (Telly lives locally, whereas Abelardo lives in [place]).  Her preferred MPOA is her son Abelardo Ortiz ([phone]); can complete MPOA paperwork prior to discharge. She reports she has 4 grandchildren, 1 lives locally who she is close to. She reports that while her son is of assistance he works a lot. She has many friends who she relies on for help, including one who takes her to medical appointments. She reports that she was recently hospitalized and did well at home, ambulating well, however she has progressively become weaker and more tired. She reports she sleeps most of the day (falling asleep at times during our conversation). She reports she is indepdent of ADLs however this is becoming increasingly difficult. She was driving but lately has not felt up to driving due to low blood pressure and being so tired.   - She is very clear that her goals are to medically manage her HF and to maintain her current health.   - Patient reports she has outpt physical therapy but this was discontinued due to "it being too intense for my " "heart". Patient is open to home health.   - I did introduce code status, patient reports that while she understands this is a very important topic, she has never thought about this and wants to think and discuss this with her son. She wants to discuss her wishes so she does not put any decisions on him.  - Recommend home-based palliative medicine upon discharge.       "

## 2022-08-31 NOTE — ASSESSMENT & PLAN NOTE
- Chronic with recent baseline of 129  - trended down to 125, but repeat 131 >>127>>127 >>129 so question lab accuracy.  - nephrology following  -  trend BMP  - samsca x2 for persistent hyponatremia despite water restriction/lasix per nephrology:  does have history of chronic hyponatremia with baseline around 129  -difficult to gauge outpt treatment regimen.  -consider samsca twice weekly vs lasix/danielito and monitoring of BMP  - will follow up cardiology recommendations

## 2022-08-31 NOTE — PT/OT/SLP PROGRESS
Physical Therapy Treatment    Patient Name:  Odette Hart   MRN:  [id number]    Recommendations:     Discharge Recommendations:  home health PT, home health OT   Discharge Equipment Recommendations: other (see comments) (TBD pending patient progress)   Barriers to discharge:  Increased mob    Assessment:     Odette Hart is a 66 y.o. female admitted with a medical diagnosis of Acute on chronic systolic heart failure.  She presents with the following impairments/functional limitations:  weakness, impaired endurance, impaired functional mobility, gait instability . Upon arrival, pt was L sidelying on room air. Pt was CGA sup<>sit. Pt was SBA sit<>stand w/ RW. Pt amb 200ft CGA w/ RW, slow velocity. Pt ascend/descend 5 stairs w/ RW BHR w/ step to gait pattern.     Rehab Prognosis: Good; patient would benefit from acute skilled PT services to address these deficits and reach maximum level of function.    Recent Surgery: * No surgery found *      Plan:     During this hospitalization, patient to be seen 3 x/week to address the identified rehab impairments via gait training, therapeutic activities, therapeutic exercises, neuromuscular re-education and progress toward the following goals:    Plan of Care Expires:  09/28/22    Subjective     Chief Complaint: I have pain in my L Hip  Patient/Family Comments/goals: None stated  Pain/Comfort:  Pain Rating 1: 0/10      Objective:     Communicated with [place] RN prior to session.  Patient found left sidelying with telemetry, colostomy upon PT entry to room.     General Precautions: Standard, fall, respiratory   Orthopedic Precautions:N/A   Braces: N/A  Respiratory Status: Room air     Functional Mobility:  Bed Mobility:     Supine to Sit: contact guard assistance  Sit to Supine: contact guard assistance  Transfers:     Sit to Stand:  contact guard assistance with rolling walker  Gait: 200ft w/ RW CGA   Ascend/descend 5 stairs w/ RW CGA step to gait pattern      AM-PAC  6 CLICK MOBILITY          Therapeutic Activities and Exercises:   Importance of mob, safety awareness, gait training for increased functional mob    Patient left left sidelying with all lines intact, call button in reach, and RN notified..    GOALS:   Multidisciplinary Problems       Physical Therapy Goals          Problem: Physical Therapy    Goal Priority Disciplines Outcome Goal Variances Interventions   Physical Therapy Goal     PT, PT/OT Ongoing, Progressing     Description: Goals to be met by: 2022    Patient will increase functional independence with mobility by performin. Sit<>stand with supervision with least restrictive AD.   2. Gait x 200 feet with SBA with least restrictive AD.   3. Ascend/descend 4 step(s) with least restrictive assistive device and CGA.                           Time Tracking:     PT Received On: 22  PT Start Time: 1106     PT Stop Time: 1122  PT Total Time (min): 16 min     Billable Minutes: Gait Training 16    Treatment Type: Treatment  PT/PTA: PT     PTA Visit Number: 0     2022

## 2022-08-31 NOTE — SUBJECTIVE & OBJECTIVE
Interval History: Na improved this AM, significant UOP overnight. Still sleepy this morning, but improved shortness of breath. Per nephro- consider samsca 2x weekly vs lasix/spironolactone and BMP monitoring for outpatient. Will follow up cardiology recs    Review of Systems   Constitutional:  Negative for chills and fever.   Respiratory:  Positive for shortness of breath. Negative for cough and wheezing.    Cardiovascular:  Negative for chest pain, palpitations and leg swelling.   Gastrointestinal:  Negative for abdominal pain, constipation, diarrhea, nausea and vomiting.   Genitourinary:  Negative for difficulty urinating.   Musculoskeletal:  Negative for arthralgias and gait problem.   Neurological:  Positive for weakness.   Psychiatric/Behavioral:  Negative for agitation and confusion.    Objective:     Vital Signs (Most Recent):  Temp: 97.9 °F (36.6 °C) (08/31/22 1144)  Pulse: 75 (08/31/22 1400)  Resp: 16 (08/31/22 1144)  BP: (!) 99/58 (08/31/22 1144)  SpO2: 98 % (08/31/22 1144)   Vital Signs (24h Range):  Temp:  [97.9 °F (36.6 °C)-99.5 °F (37.5 °C)] 97.9 °F (36.6 °C)  Pulse:  [73-89] 75  Resp:  [16-18] 16  SpO2:  [93 %-98 %] 98 %  BP: ()/(39-72) 99/58     Weight: 53.7 kg (118 lb 6.2 oz)  Body mass index is 20.97 kg/m².    Intake/Output Summary (Last 24 hours) at 8/31/2022 1540  Last data filed at 8/31/2022 1100  Gross per 24 hour   Intake 590 ml   Output 3200 ml   Net -2610 ml      Physical Exam  Vitals and nursing note reviewed.   Constitutional:       General: She is not in acute distress.     Appearance: Normal appearance.   Cardiovascular:      Rate and Rhythm: Normal rate and regular rhythm.   Pulmonary:      Effort: Pulmonary effort is normal. No respiratory distress.      Breath sounds: No wheezing.   Abdominal:      General: Abdomen is flat. There is no distension.      Palpations: Abdomen is soft.      Tenderness: There is no abdominal tenderness.      Comments: +colostomy   Musculoskeletal:       Right lower leg: No edema.      Left lower leg: No edema.   Skin:     General: Skin is warm and dry.   Neurological:      General: No focal deficit present.      Mental Status: She is alert.   Psychiatric:         Behavior: Behavior normal.         Thought Content: Thought content normal.       Significant Labs: All pertinent labs within the past 24 hours have been reviewed.    Significant Imaging: I have reviewed all pertinent imaging results/findings within the past 24 hours.

## 2022-08-31 NOTE — ASSESSMENT & PLAN NOTE
- BNP- 3698, pulmonary edema on CXR; EF 10% on 8/4  - Appreciate cardiology consult  - received 40mg IV Lasix BID initially, transitioned to once daily the stopped all together given sodium (see below)  - breathing improving but still with rales  - continue coreg
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
## 2022-08-31 NOTE — PROGRESS NOTES
[facility] ([place])  Adult Nutrition  Progress Note    SUMMARY     Recommendations    Recommendation/Intervention:   1. Encourage po intake    2. Provide recommended diet education   3. Collaboration with medical providers    Goals: Patient to consume, tolerate, and understand the importance of compliance of recommended diet.    Nutrition Goal Status: progressing     Communication of RD Recs: other (comment) (poc, consults)    Assessment and Plan  Nutrition Problem  Increased nutritional needs    Related to (etiology):   Condition associated with diagnoses    Signs and Symptoms (as evidenced by):   Wounds, delayed healing and decreased po intake     Interventions/Recommendations (treatment strategy):  1. Encourage po intake    2. Provide recommended diet education   3. Collaboration with medical providers    Nutrition Diagnosis Status:   Continues      Malnutrition Assessment                                       Reason for Assessment    Reason For Assessment:follow up    Diagnosis: cardiac disease, gastrointestinal disease  Patient Active Problem List   Diagnosis    Ischemic cardiomyopathy    Automatic implantable cardioverter-defibrillator in situ    History of coronary artery stent placement    Chronic congestive heart failure    Elevated rheumatoid factor    RBBB with left anterior fascicular block    History of diverticulitis    Coronary artery disease of native artery of native heart with stable angina pectoris    Familial hypercholesterolemia    Normocytic anemia    History of myocardial infarction    Primary hypertension    Former smoker    Colovesical fistula    Urinary tract infection with pyuria    Leukopenia    At risk for surgical complication    Acute cholecystitis    Calculus of gallbladder with cholecystitis without biliary obstruction    Anemia    Acute on chronic systolic heart failure    Dizziness    Mood disorder    Alteration in skin integrity in adult    Prolonged Q-T interval on ECG     Hyponatremia    Debility    Recurrent major depressive disorder    Hypotension    VRE (vancomycin-resistant Enterococci) infection    Hypomagnesemia    CAD (coronary artery disease)    Increased nutritional needs    COPD (chronic obstructive pulmonary disease)    Superficial dehiscence of operation wound    Cellulitis of abdominal wall    Hypoalbuminemia    Severe protein-calorie malnutrition    Wound infection after surgery    Abdominal wall cellulitis    Advance care planning    Peripheral edema    History of partial colectomy    Colostomy in place    Asymptomatic bacteriuria    Hypertriglyceridemia    Statin intolerance    Encounter for palliative care    Acute combined systolic and diastolic congestive heart failure    Shortness of breath     Past Medical History:   Diagnosis Date    Acute coronary syndrome     Acute exacerbation of chronic obstructive pulmonary disease (COPD) 3/23/2022    Allergy     Arthritis     Cardiomyopathy     CHF (congestive heart failure)     Coronary artery disease     Coronary artery disease of native artery of native heart with stable angina pectoris 4/19/2021 1/24/2022: [facility]: Cath: LAD: Proximal stent patent. LCX: Proximal 80%. LCX: Dominant. Moderate disease. LCX: HEVER 2.75 x 18 mm. Distal dissection. HEVER 2.75 x 22 mm.     Diverticulitis     Diverticulosis     Familial hypercholesterolemia 1/22/2022    Former smoker 1/24/2022    Heart attack     Heart disease     History of myocardial infarction 1/24/2022    Hyperlipidemia     Hypertension     Hypertension 1/24/2022    ICD (implantable cardioverter-defibrillator) in place     Non-ST elevation myocardial infarction (NSTEMI) 2/4/2019       General Information Comments:   8/27/2022: Patient with a 2gm, 1500ml fluid restriction diet.  PO intake noted at 50% with reported decreased po intake and fair tolerance. Allergies: Shellfish. Patient with previous medical history of malnutrition related to muscle and fat wasting along with  "decreased po intake.  Patient's weight history shows weight gain from 52.6kg on 7/22 to current weight of 53.7kg.  On site RD to follow up with [facility].  Patient with colostomy and reduced bowel sounds.  Sacral wound noted with ezra ordered BID to promote wound healing. RD consulted for low na, fluid restriction educations.  Handouts provided with discharge paperwork.  RD to continue to monitor.  (RD remote)    8/31: Patient continues with oral intake and 50-75% po intake noted.  Allergies: shellfish.  Oral supplements ordered to promote wound healing.  Patient with palliative care consult but remains full code status at this time.  RD will continue to monitor.  (RD remote)    Nutrition Discharge Planning: Patient to consume and tolerate adequate nutrition to support wound healing.    Nutrition Risk Screen    Nutrition Risk Screen: large or nonhealing wound, burn or pressure injury    Nutrition/Diet History    Spiritual, Cultural Beliefs, Latter day Practices, Values that Affect Care: no  Supplemental Drinks or Food Habits: Ezra  Food Allergies: NKFA  Factors Affecting Nutritional Intake: malabsorption    Anthropometrics    Temp: 99 °F (37.2 °C)  Height Method: Stated  Height: 5' 3" (160 cm)  Height (inches): 63 in  Weight Method: Bed Scale  Weight: 53.7 kg (118 lb 6.2 oz)  Weight (lb): 118.39 lb  Ideal Body Weight (IBW), Female: 115 lb  % Ideal Body Weight, Female (lb): 102.96 %  BMI (Calculated): 21  BMI Grade: 18.5-24.9 - normal     Wt Readings from Last 16 Encounters:   08/29/22 53.7 kg (118 lb 6.2 oz)   08/18/22 50.3 kg (111 lb)   08/12/22 50.6 kg (111 lb 8 oz)   08/11/22 50.4 kg (111 lb)   08/04/22 53.5 kg (118 lb)   07/18/22 52.6 kg (115 lb 15.4 oz)   07/12/22 51.3 kg (113 lb)   07/07/22 51.3 kg (113 lb 1.5 oz)   06/30/22 51 kg (112 lb 7 oz)   06/22/22 52.1 kg (114 lb 13.8 oz)   06/06/22 53.9 kg (118 lb 13.3 oz)   05/25/22 53.9 kg (118 lb 13.3 oz)   05/16/22 59.4 kg (130 lb 15.3 oz)   05/05/22 60.7 kg (133 lb " 13.1 oz)   04/20/22 53.1 kg (117 lb 1 oz)   04/20/22 53.1 kg (117 lb 1 oz)       Lab/Procedures/Meds    Pertinent Labs Reviewed: reviewed  Pertinent Medications Reviewed: reviewed  BMP  Lab Results   Component Value Date     (L) 08/31/2022    K 4.3 08/31/2022    CL 93 (L) 08/31/2022    CO2 25 08/31/2022    BUN 10 08/31/2022    CREATININE 0.7 08/31/2022    CALCIUM 8.4 (L) 08/31/2022    ANIONGAP 11 08/31/2022    ESTGFRAFRICA >60 07/23/2022    EGFRNONAA >60 07/23/2022     Lab Results   Component Value Date     (L) 08/31/2022    K 4.3 08/31/2022    CL 93 (L) 08/31/2022    CO2 25 08/31/2022     Lab Results   Component Value Date    CREATININE 0.7 08/31/2022    BUN 10 08/31/2022     (L) 08/31/2022    K 4.3 08/31/2022    CL 93 (L) 08/31/2022    CO2 25 08/31/2022     Lab Results   Component Value Date    CALCIUM 8.4 (L) 08/31/2022    PHOS 3.3 08/26/2022     Lab Results   Component Value Date    LABPROT 12.5 07/21/2022    ALBUMIN 1.9 (L) 08/29/2022     Lab Results   Component Value Date    HGBA1C 5.0 08/26/2022     Scheduled Meds:   aspirin  81 mg Oral Daily    carvediloL  3.125 mg Oral BID    clopidogreL  75 mg Oral Daily    docusate sodium  100 mg Oral BID    enoxaparin  40 mg Subcutaneous Daily    ezetimibe  10 mg Oral QHS    [START ON 9/1/2022] furosemide  20 mg Oral Daily    [START ON 9/1/2022] midodrine  5 mg Oral Daily    pantoprazole  40 mg Oral Daily    spironolactone  12.5 mg Oral Daily     Continuous Infusions:  PRN Meds:.ALPRAZolam, ondansetron, simethicone, sodium chloride 0.9%, traMADoL    Physical Findings/Assessment         Estimated/Assessed Needs    Weight Used For Calorie Calculations: 53.7 kg (118 lb 6.2 oz)  Energy Calorie Requirements (kcal): 25-30kcals (1342-1611kcals per day)     Protein Requirements: 1.0-1.2g/kg  Weight Used For Protein Calculations: 53.7 kg (118 lb 6.2 oz)  Fluid Requirements (mL): 1500ml fluid restriction  Estimated Fluid Requirement Method: other (see comments)  (fluid restriction)  RDA Method (mL): 25  CHO Requirement: 168-201      Nutrition Prescription Ordered    Current Diet Order: 2 gm na, 1500 fluid restriction  Oral Nutrition Supplement: yoana BID    Evaluation of Received Nutrient/Fluid Intake    % Kcal Needs: 50  % Protein Needs: 50  Energy Calories Required: meeting needs  Protein Required: meeting needs  Fluid Required: meeting needs  Tolerance: tolerating  % Intake of Estimated Energy Needs: 50 - 75 %  % Meal Intake: 50 - 75 %  Intake/Output - Last 3 Shifts         08/29 0700 08/30 0659 08/30 0700 08/31 0659 08/31 0700 09/01 0659    P.O. 218 350     Total Intake(mL/kg) 218 (4.1) 350 (6.5)     Urine (mL/kg/hr) 2551 (2) 3200 (2.5)     Total Output 2551 3200     Net -2330 -2150                    Nutrition Risk    Level of Risk/Frequency of Follow-up: moderate       Monitor and Evaluation    Food and Nutrient Intake: energy intake, food and beverage intake  Food and Nutrient Adminstration: diet order  Knowledge/Beliefs/Attitudes: food and nutrition knowledge/skill, beliefs and attitudes  Physical Activity and Function: nutrition-related ADLs and IADLs, factors affecting access to physical activity  Anthropometric Measurements: height/length, weight, weight change, body mass index  Biochemical Data, Medical Tests and Procedures: glucose/endocrine profile, gastrointestinal profile, electrolyte and renal panel, lipid profile, inflammatory profile       Nutrition Follow-Up    RD Follow-up?: Yes  Suri Rivers, MS, RDN, LDN

## 2022-08-31 NOTE — PROGRESS NOTES
[facility] Nephrology Progress Note    Date of Admit: 2022    Chief Complaint/Reason for Consult     Shortness of Breath (Pt c/o difficulty breathing with associated weakness and nausea.  Pmh of CHF)    Reason for consult: Hyponatremia    Subjective:      History of Present Illness:  Odette Hart is a 66 y.o. female who  presented to [facility] on 22 with worsening shortness of breath. Has history of CHFrEF and has had multiple hospitalizations for dyspnea. BNP > 3k on presentation and pulmonary edema noted on CXR. EF of 10% on last Echo. Patient diuresed with IV Lasix but noted to have worsening hyponatremia prompting nephrology eval.    Interval history:   Diuresed well with samsca (3200 cc).  Labs stable and pt feels better.  Discussed with team.      Review of Systems:  Pertinent positives and negatives are listed in HPI.  All other systems are reviewed and are negative.     Objective:   Last 24 Hour Vital Signs:  BP  Min: 81/41  Max: 127/60  Temp  Av.8 °F (37.1 °C)  Min: 98 °F (36.7 °C)  Max: 99.5 °F (37.5 °C)  Pulse  Av.6  Min: 70  Max: 89  Resp  Av.8  Min: 16  Max: 18  SpO2  Av.4 %  Min: 93 %  Max: 98 %  Body mass index is 20.97 kg/m².  I/O last 3 completed shifts:  In: 568 [P.O.:568]  Out: 3950 [Urine:3950]    Physical Examination:  Gen: NAD, AAOx3  HEENT: NCAT, EOMI, PERRL, MM, pink conjunctiva  Neck: no thyroidmegaly, no LAD  Cardio: RRR, normal S1/S2, no MRG, JVP wnl  Lungs:  CTA today.   Abd: soft non tender, non distended, normal bowel sounds, no hepatosplenomegaly  Ext: 2+ pulses B/L, no clubbing, cyanosis, edema  Skin: warm, dry, no rashes noted  Neuro: CN 2-12 grossly intact, UE/LE motor 5/5, sensation intact  Psych: conversational, responsive      Laboratory:  Most Recent Data:    Recent Labs   Lab 22  0202 22  0415 22  0523   *   < > 129*   K 4.7   < > 4.3   CL 98   < > 93*   CO2 21*   < > 25   BUN 23   < > 10   CREATININE 0.9    < > 0.7   CALCIUM 8.2*   < > 8.4*   PHOS 3.3  --   --     < > = values in this interval not displayed.             Radiology:  X-Ray Chest AP Portable   Final Result      Cardiomegaly and findings suggestive of pulmonary edema/CHF although correlation for infectious process advised.         Electronically signed by: Lucy Tovar MD   Date:    08/26/2022   Time:    03:21           Assessment and Plan:      Odette Hart is a 66 y.o.female    Hyponatremia-hypervolemic  - does have history of chronic hyponatremia with baseline around 129  - On 8/27 noted to have worsening level to 125. Held Lasix and repeated NA level and was up to 131 but suspect labs error and was stagnant at 127  - dosed samsca X1 for persistent <NA despite water restriction/lasix with initial improvement and dropped again.  -redosed samsca with great response (3200 cc) and NA now 129  -difficult to gauge outpt treatment regimen.  -consider samsca twice weekly vs lasix/danielito and monitoring of BMP.      Acute on chronic systolic heart failure  - severely reduced EF of 10% earlier this month  - Cardio following  - BP too soft for ACE/ARB.  - see below.    Hypertension  - Remains on Coreg  -as above  -remains low and added low dose midodrine.     Normocytic Anemia:  -RIGO noted.  -dosed venofer.    End stage HF as above:  -appropriate for home based palliative care and pt now open to it.  -consult placed.       See above  Ok to DC from Renal  Discussed with Chelsea GANNON

## 2022-08-31 NOTE — PLAN OF CARE
"  Problem: Occupational Therapy  Goal: Occupational Therapy Goal  Description: Goals to be met by: 9/3/22    Patient will increase functional independence with ADLs by performing:    LB dressing at SBA.    BSC/toilet transfers at SBA. MET 8/31/2022   ToIleting at SBA.  MET 8/31/2022   UB dressing at SBA.  MET 8/31/2022   Grooming sitting EOB at SBA.  MET 8/31/2022 in standing    Outcome: Ongoing, Progressing   Pt has met 4 established goals today.  Pt stating she feels much better today than she has been since admission but still feels "fluid" pressing on her chest.  Pt reporting she prefers to hold onto furniture and walls for balance rather than using RW in room.  Pt educated on need for RW for balance and off loading weight of bilateral LE onto bilateral UE using RW for energy conservation and safety.  At end of tx session pt verbalizing understanding and receptive to continue using RW.  Pt needs reinforcement for using RW in the future for safety.  Pt and nurse educated on pt needing assist of another person during transfers for toileting, even when using BSC.    "

## 2022-08-31 NOTE — PROGRESS NOTES
"[facility] operated by [facility] Medicine  Progress Note    Patient Name: Odette Hart  MRN: [id number]  Patient Class: IP- Inpatient   Admission Date: 8/26/2022  Length of Stay: 5 days  Attending Physician: Marco Antonio Alvarado MD  Primary Care Provider: Braxton Barragan MD        Subjective:     Principal Problem:Acute on chronic systolic heart failure        HPI:  Per Michael Long, NP:  "The patient is a 66 y.o. female with a past medical history of MI, HLD, CAD, cardiomyopathy, HTN, and CHF who presents for evaluation of shortness of breath onset earlier today. She reports "I've been feeling bad," endorsing associated symptoms of nausea and weakness. She states she has been near vomiting but denies any vomiting episodes. She reports elevated temperature at 99 F last night. She states her current presentations feel similar to her previous hospitalizations for dyspnea. Pt is regularly followed by cardiology. She denies pain elsewhere and other somatic complaints.  BNP elevated to greater than 3000 with pulmonary edema noted on CXR.  She will be admitted for further management of her acute on chronic systolic congestive heart failure."      Overview/Hospital Course:  Odette Hart was admitted for CHF exacerbation. Diuresing with IVP lasix, cardiology following along. During stay her chronic hyponatremia worsened, nephrology consulted. Holding lasix, giving tolvaptan x 2 with improvement in sodium.       Given her advanced heart failure, palliative care consulted and recommending palliative based care at home (referral placed)      Interval History: Na improved this AM, significant UOP overnight. Still sleepy this morning, but improved shortness of breath. Per nephro- consider samsca 2x weekly vs lasix/spironolactone and BMP monitoring for outpatient. Will follow up cardiology recs    Review of Systems   Constitutional:  Negative for chills and fever.   Respiratory:  Positive for shortness of breath. " Negative for cough and wheezing.    Cardiovascular:  Negative for chest pain, palpitations and leg swelling.   Gastrointestinal:  Negative for abdominal pain, constipation, diarrhea, nausea and vomiting.   Genitourinary:  Negative for difficulty urinating.   Musculoskeletal:  Negative for arthralgias and gait problem.   Neurological:  Positive for weakness.   Psychiatric/Behavioral:  Negative for agitation and confusion.    Objective:     Vital Signs (Most Recent):  Temp: 97.9 °F (36.6 °C) (08/31/22 1144)  Pulse: 75 (08/31/22 1400)  Resp: 16 (08/31/22 1144)  BP: (!) 99/58 (08/31/22 1144)  SpO2: 98 % (08/31/22 1144)   Vital Signs (24h Range):  Temp:  [97.9 °F (36.6 °C)-99.5 °F (37.5 °C)] 97.9 °F (36.6 °C)  Pulse:  [73-89] 75  Resp:  [16-18] 16  SpO2:  [93 %-98 %] 98 %  BP: ()/(39-72) 99/58     Weight: 53.7 kg (118 lb 6.2 oz)  Body mass index is 20.97 kg/m².    Intake/Output Summary (Last 24 hours) at 8/31/2022 1540  Last data filed at 8/31/2022 1100  Gross per 24 hour   Intake 590 ml   Output 3200 ml   Net -2610 ml      Physical Exam  Vitals and nursing note reviewed.   Constitutional:       General: She is not in acute distress.     Appearance: Normal appearance.   Cardiovascular:      Rate and Rhythm: Normal rate and regular rhythm.   Pulmonary:      Effort: Pulmonary effort is normal. No respiratory distress.      Breath sounds: No wheezing.   Abdominal:      General: Abdomen is flat. There is no distension.      Palpations: Abdomen is soft.      Tenderness: There is no abdominal tenderness.      Comments: +colostomy   Musculoskeletal:      Right lower leg: No edema.      Left lower leg: No edema.   Skin:     General: Skin is warm and dry.   Neurological:      General: No focal deficit present.      Mental Status: She is alert.   Psychiatric:         Behavior: Behavior normal.         Thought Content: Thought content normal.       Significant Labs: All pertinent labs within the past 24 hours have been  reviewed.    Significant Imaging: I have reviewed all pertinent imaging results/findings within the past 24 hours.      Assessment/Plan:      * Acute on chronic systolic heart failure  - BNP- 3698, pulmonary edema on CXR; EF 10% on 8/4  - Appreciate cardiology consult  - received 40mg IV Lasix BID initially, transitioned to once daily the stopped all together given sodium (see below)  - breathing improving but still with rales  - continue coreg      Debility  - PT/OT       Hyponatremia  - Chronic with recent baseline of 129  - trended down to 125, but repeat 131 >>127>>127 >>129 so question lab accuracy.  - nephrology following  -  trend BMP  - samsca x2 for persistent hyponatremia despite water restriction/lasix per nephrology:  does have history of chronic hyponatremia with baseline around 129  -difficult to gauge outpt treatment regimen.  -consider samsca twice weekly vs lasix/danielito and monitoring of BMP  - will follow up cardiology recommendations         Primary hypertension  - BP tends to run low  - Continue coreg with hold parameters      Familial hypercholesterolemia  Continue Zetia        VTE Risk Mitigation (From admission, onward)         Ordered     enoxaparin injection 40 mg  Daily         08/26/22 0325     IP VTE HIGH RISK PATIENT  Once         08/26/22 0325     Place sequential compression device  Until discontinued         08/26/22 0325                Discharge Planning   SIVAN:      Code Status: Full Code   Is the patient medically ready for discharge?:     Reason for patient still in hospital (select all that apply): Patient trending condition and Consult recommendations  Discharge Plan A: Home with family                  Chelsea Meza PA-C  Department of Hospital Medicine   [facility] ([facility])

## 2022-08-31 NOTE — PLAN OF CARE
Nutrition Plan of Care:    Recommendations     Recommendation/Intervention:   1. Encourage po intake    2. Provide recommended diet education   3. Collaboration with medical providers     Goals: Patient to consume, tolerate, and understand the importance of compliance of recommended diet.     Nutrition Goal Status: progressing      Communication of RD Recs: other (comment) (poc, consults)    Suri Rivers MS, RDN, LDN

## 2022-08-31 NOTE — PT/OT/SLP PROGRESS
"Occupational Therapy   Treatment    Name: Odette Hart  MRN: [id number]  Admitting Diagnosis:  Acute on chronic systolic heart failure       Recommendations:     Discharge Recommendations: home health OT, home health PT (Assist from friends)  Discharge Equipment Recommendations:  bedside commode  Barriers to discharge:  Inaccessible home environment, Decreased caregiver support (Current level of function)    Assessment:     Odette Hart is a 66 y.o. female with a medical diagnosis of Acute on chronic systolic heart failure.  She presents alert, oriented and agreeable to participating in OT tx session.  Education and training on safety and energy conservation for ADL and ADL mobility and need for pt to use AD for ADL mobility.  Pt stating she doesn't have room in her mobile home for using her rollator.  OT reinforced need for pt to use AD device for ADL mobility and pt agreed that she can use her RW inside her mobile home for improved balance and increased safety.  Pt will need reinforcement of safety recommendations. Performance deficits affecting function are weakness, impaired endurance, impaired self care skills, impaired functional mobility, gait instability, impaired balance, decreased lower extremity function, decreased ROM, impaired cardiopulmonary response to activity, decreased safety awareness.     Rehab Prognosis:   Fair- ; patient would benefit from acute skilled OT services to address these deficits and reach maximum level of function.       Plan:     Patient to be seen 4 x/week to address the above listed problems via self-care/home management, therapeutic activities  Plan of Care Expires: 09/25/22  Plan of Care Reviewed with: patient    Subjective     Pain/Comfort:  Pain Rating 1:  (Pt reporting "pressure on chest from fluid".)    Objective:     Communicated with: Leonarda otero, prior to session.  Patient found HOB elevated with telemetry, colostomy upon OT entry to room.    General " Precautions: Standard, fall, respiratory   Orthopedic Precautions:N/A   Braces: N/A  Respiratory Status: Room air     Occupational Performance:     Bed Mobility:    Patient completed Supine to Sit with modified independence     Functional Mobility/Transfers:  Sit to stand: SBA with RW and verbal cues for hand placement  Toilet transfers: SBA with RW  Functional Mobility: Education on safety for ADL mobility and need for pt to use RW    Activities of Daily Living:  Feeding: Indep  Grooming: SBA standing for combing hair and washing hands at sink  UB Dressing: SBA for donning hospital gown as a robe  Toileting: SBA using Toilet; Pt reporting she has been getting OOB on her own to use the toilet or BSC.  Education provided to nurse and pt that pt needs assist for all OOB mobility and activities to decrease fall risk.    [facility] 6 Click ADL: 19    Treatment & Education:  Role of OT, POC, safety with ADL mobility and need to use RW, use of RW at home due to her rollator not fitting through walkways in her mobile home, energy conservation, standing balance for ADL    Patient left sitting edge of bed with all lines intact, call button in reach, and nurse, [place] notified    GOALS:   Multidisciplinary Problems       Occupational Therapy Goals          Problem: Occupational Therapy    Goal Priority Disciplines Outcome Interventions   Occupational Therapy Goal     OT, PT/OT Ongoing, Progressing    Description: Goals to be met by: 9/3/22    Patient will increase functional independence with ADLs by performing:    LB dressing at SBA.    BSC/toilet transfers at SBA. MET 8/31/2022   ToIleting at SBA.  MET 8/31/2022   UB dressing at SBA.  MET 8/31/2022   Grooming sitting EOB at SBA.  MET 8/31/2022 in standing                         Time Tracking:     OT Date of Treatment: 08/31/22  OT Start Time: 1131  OT Stop Time: 1211  OT Total Time (min): 40 min    Billable Minutes:Self Care/Home Management 40    OT/FROILAN: OT           8/31/2022

## 2022-09-01 PROBLEM — I50.23 ACUTE ON CHRONIC SYSTOLIC HEART FAILURE: Status: RESOLVED | Noted: 2022-05-10 | Resolved: 2022-01-01

## 2022-09-01 NOTE — ASSESSMENT & PLAN NOTE
Chronic with recent baseline of 129 and likely from Cardiorenal in origin.  Received Tolvaptan x 2 and now starting biw.  Followed by Nephrology.    Plan:  Follow up with Nephrology recommendations - Tolvaptan 30mg twice weekly  Follow BMP

## 2022-09-01 NOTE — ASSESSMENT & PLAN NOTE
S/p MI and s/p stents  Home Meds:  ASA 81mg/Plavix 75mg, Carvedilol 3.125mg bid.  Has Statin intolerance.  -Continue Home Meds

## 2022-09-01 NOTE — PLAN OF CARE
Problem: Adult Inpatient Plan of Care  Goal: Plan of Care Review  Outcome: Ongoing, Progressing  Goal: Patient-Specific Goal (Individualized)  Outcome: Ongoing, Progressing  Goal: Absence of Hospital-Acquired Illness or Injury  Outcome: Ongoing, Progressing  Goal: Optimal Comfort and Wellbeing  Outcome: Ongoing, Progressing  Goal: Readiness for Transition of Care  Outcome: Ongoing, Progressing     Problem: Impaired Wound Healing  Goal: Optimal Wound Healing  Outcome: Ongoing, Progressing     Problem: Skin Injury Risk Increased  Goal: Skin Health and Integrity  Outcome: Ongoing, Progressing     Problem: Fall Injury Risk  Goal: Absence of Fall and Fall-Related Injury  Outcome: Ongoing, Progressing     Problem: Coping Ineffective  Goal: Effective Coping  Outcome: Ongoing, Progressing

## 2022-09-01 NOTE — SUBJECTIVE & OBJECTIVE
Interval History: Patient is awake and alert this morning.  Feels her breathing is stable to overall improved, but still on O2 2L NC.      Review of Systems   Constitutional:  Negative for chills and fever.   HENT:  Negative for ear pain.    Eyes:  Negative for pain.   Respiratory:  Positive for shortness of breath. Negative for cough and wheezing.    Cardiovascular:  Negative for chest pain, palpitations and leg swelling.   Gastrointestinal:  Negative for abdominal pain, constipation, diarrhea, nausea and vomiting.   Genitourinary:  Negative for difficulty urinating and dysuria.   Musculoskeletal:  Negative for arthralgias and gait problem.   Neurological:  Positive for weakness. Negative for dizziness and headaches.   Psychiatric/Behavioral:  Negative for agitation and confusion.    Objective:     Vital Signs (Most Recent):  Temp: 98.6 °F (37 °C) (09/01/22 0730)  Pulse: 72 (09/01/22 1000)  Resp: 18 (09/01/22 0730)  BP: (!) 99/52 (09/01/22 0855)  SpO2: 98 % (09/01/22 0730)   Vital Signs (24h Range):  Temp:  [97.8 °F (36.6 °C)-98.7 °F (37.1 °C)] 98.6 °F (37 °C)  Pulse:  [71-91] 72  Resp:  [16-19] 18  SpO2:  [96 %-100 %] 98 %  BP: ()/(47-61) 99/52     Weight: 53.7 kg (118 lb 6.2 oz)  Body mass index is 20.97 kg/m².    Intake/Output Summary (Last 24 hours) at 9/1/2022 1036  Last data filed at 9/1/2022 0900  Gross per 24 hour   Intake 840 ml   Output 1150 ml   Net -310 ml        Physical Exam  Vitals and nursing note reviewed.   Constitutional:       General: She is not in acute distress.     Appearance: Normal appearance. She is ill-appearing (chronically).   HENT:      Head: Normocephalic and atraumatic.      Right Ear: External ear normal.      Left Ear: External ear normal.      Nose: Nose normal.      Mouth/Throat:      Mouth: Mucous membranes are moist.      Pharynx: Oropharynx is clear.   Eyes:      General: No scleral icterus.     Pupils: Pupils are equal, round, and reactive to light.   Cardiovascular:       Rate and Rhythm: Normal rate and regular rhythm.   Pulmonary:      Effort: Pulmonary effort is normal. No respiratory distress.      Breath sounds: Rales present. No wheezing.   Abdominal:      General: Abdomen is flat. There is no distension.      Palpations: Abdomen is soft.      Tenderness: There is no abdominal tenderness.      Comments: +colostomy   Musculoskeletal:      Cervical back: Normal range of motion and neck supple.      Right lower leg: No edema.      Left lower leg: No edema.   Skin:     General: Skin is warm and dry.   Neurological:      General: No focal deficit present.      Mental Status: She is alert and oriented to person, place, and time. Mental status is at baseline.   Psychiatric:         Mood and Affect: Mood normal.         Behavior: Behavior normal.         Thought Content: Thought content normal.       Significant Labs: All pertinent labs within the past 24 hours have been reviewed.    Significant Imaging: I have reviewed all pertinent imaging results/findings within the past 24 hours.

## 2022-09-01 NOTE — PLAN OF CARE
Problem: Occupational Therapy  Goal: Occupational Therapy Goal  Description: Goals to be met by: 9/3/22    Patient will increase functional independence with ADLs by performing:    LB dressing at SBA.    BSC/toilet transfers at SBA. MET 8/31/2022   ToIleting at SBA.  MET 8/31/2022   UB dressing at SBA.  MET 8/31/2022   Grooming sitting EOB at SBA.  MET 8/31/2022 in standing    Outcome: Ongoing, Progressing   Pt needing Min A with LB dressing.  Pt reporting that she will have assistance from friends when she returns home.  Pt stating she was glad to hear that her heart may function better taking new medications recommended.  Pt reporting that she doesn't feel unsteady and has been walking to the bathroom on her own without RW.  Educated pt on safety but pt insists she doesn't need RW at this time due to not feeling like she is going to fall.  OT had pt walk to bathroom without AD and back to bed without any LOB.  Pt adamant about not using RW at this time unless she feels weak.

## 2022-09-01 NOTE — ASSESSMENT & PLAN NOTE
Patient with CAD s/p MI with known CM with Chronic systolic and diastolic CHF who presented with CHF.  Followed by Freddy Arenas MD at [facility].  BNP was 3698 and CXR with pulmonary edema on admission.  Repeat TTE on 6/4/2022 with EF down to 10% and Grade II DD with low normal RVSF.  Started on Lasix for diuresis, but course complicated with hypotension and hyponatremia.  Followed by Cardiology and Nephrology.  Urine output decent at 1150ml.  Still with mild crackles and on 2L O2NC.    Home Meds:  Carvedilol 3.125mg bid and Lasix 40mg  Hospital Meds:  Carvedilol 3.125mg bid, Lasix 20mg qday    Plan:  Follow up with Cardiology and Nephrology recommendations - continue diuresis.  Consider SGLT2 (nonformulary inpatient).  Continue with current regimen  Continue with Low Na diet and Fluid restrictions  Monitor I&Os

## 2022-09-01 NOTE — PT/OT/SLP PROGRESS
Physical Therapy Treatment    Patient Name:  Odette Hart   MRN:  [id number]    Recommendations:     Discharge Recommendations:  home health PT, home health OT   Discharge Equipment Recommendations: other (see comments) (TBD pending patient progress)   Barriers to discharge:  functional mobility impairments    Assessment:     Odette Hart is a 66 y.o. female admitted with a medical diagnosis of Acute on chronic systolic heart failure.  She presents with the following impairments/functional limitations:  weakness, gait instability, impaired endurance, impaired functional mobility.    Supine>sit with SBA  Sit>stand SBA with RW  Amb 120' with RW and CGA, decreased gait speed, jan and B step length, no LoB  Pt c/o sacral wound, reports some relief with OOB, progressing toward goals  Rec HHPT    Rehab Prognosis: Good; patient would benefit from acute skilled PT services to address these deficits and reach maximum level of function.    Recent Surgery: * No surgery found *      Plan:     During this hospitalization, patient to be seen 3 x/week to address the identified rehab impairments via gait training, therapeutic activities, therapeutic exercises, neuromuscular re-education and progress toward the following goals:    Plan of Care Expires:  22    Subjective     Chief Complaint: sacral wound pain  Patient/Family Comments/goals: I was in really bad shape, totally malnourished, I almost   Pain/Comfort:  Pain Rating 1: 8/10  Location - Side 1: Right  Location - Orientation 1: posterior  Location 1: sacral spine (pressure injury)  Pain Addressed 1: Reposition, Distraction, Cessation of Activity, Nurse notified  Pain Rating Post-Intervention 1: 8/10 (relief while ambulating)      Objective:     Communicated with nurse Marcano prior to session.  Patient found HOB elevated with telemetry, colostomy, peripheral IV upon PT entry to room.     General Precautions: Standard, fall, respiratory   Orthopedic  Precautions:N/A   Braces:    Respiratory Status: Room air     Functional Mobility:  Bed Mobility:     Supine to Sit: stand by assistance  Transfers:     Sit to Stand:  stand by assistance with rolling walker  Gait: 120' with RW and CGA, no LoB, decreased gait speed, jan and B step length      AM-PAC 6 CLICK MOBILITY  Turning over in bed (including adjusting bedclothes, sheets and blankets)?: 4  Sitting down on and standing up from a chair with arms (e.g., wheelchair, bedside commode, etc.): 3  Moving from lying on back to sitting on the side of the bed?: 4  Moving to and from a bed to a chair (including a wheelchair)?: 3  Need to walk in hospital room?: 3  Climbing 3-5 steps with a railing?: 3  Basic Mobility Total Score: 20       Therapeutic Activities and Exercises:   Seated therex: heel raises, LAQ x 10  Gait training as noted    Patient left sitting edge of bed with all lines intact, call button in reach, and nurse Krys present..    GOALS:   Multidisciplinary Problems       Physical Therapy Goals          Problem: Physical Therapy    Goal Priority Disciplines Outcome Goal Variances Interventions   Physical Therapy Goal     PT, PT/OT Ongoing, Progressing     Description: Goals to be met by: 2022    Patient will increase functional independence with mobility by performin. Sit<>stand with supervision with least restrictive AD.   2. Gait x 200 feet with SBA with least restrictive AD.   3. Ascend/descend 4 step(s) with least restrictive assistive device and CGA.                           Time Tracking:     PT Received On: 22  PT Start Time: 08     PT Stop Time: 0856  PT Total Time (min): 23 min     Billable Minutes: Gait Training 15 and Therapeutic Exercise 8    Treatment Type: Treatment  PT/PTA: PTA     PTA Visit Number: 1     2022

## 2022-09-01 NOTE — ASSESSMENT & PLAN NOTE
Home Meds:  Carvedilol 3.125mg bid and Lasix 4omg  SBP runs low outpatient.  Stable at 93/47 this morning  -Continue Carvedilol and Lasix   -Monitor and adjust as needed

## 2022-09-01 NOTE — PT/OT/SLP PROGRESS
"Occupational Therapy   Treatment    Name: Odette Hart  MRN: [id number]  Admitting Diagnosis:  Acute combined systolic and diastolic congestive heart failure       Recommendations:     Discharge Recommendations: home health OT, home health PT (Assist from friends)  Discharge Equipment Recommendations:  bedside commode  Barriers to discharge:  Inaccessible home environment, Decreased caregiver support (Current functional level)    Assessment:     Odette Hart is a 66 y.o. female with a medical diagnosis of Acute combined systolic and diastolic congestive heart failure.  She continues to feel weak and fatigued Pt reporting she now feels a little more hopeful after speaking with Cardiologist that maybe her heart will function a little better with new medications.  Pt continues to say she "feels" the fluid pressing on her chest.  Performance deficits affecting function are weakness, impaired endurance, impaired self care skills, impaired functional mobility, gait instability, impaired balance, decreased safety awareness, decreased lower extremity function, impaired skin, pain, impaired cardiopulmonary response to activity.     Rehab Prognosis:   Guarded ; patient would benefit from acute skilled OT services to address these deficits and reach maximum level of function.       Plan:     Patient to be seen 4 x/week to address the above listed problems via self-care/home management, therapeutic activities  Plan of Care Expires: 09/25/22  Plan of Care Reviewed with: patient    Subjective     Pain/Comfort:  Pain Rating 1:  (Pt not rating pain.  Pt reporting the only pain she has is her sacral wound pain.)    Objective:     Communicated with: nurseKrys, prior to session.  Patient found HOB elevated with colostomy, telemetry, peripheral IV upon OT entry to room.    General Precautions: Standard, fall, respiratory   Orthopedic Precautions:N/A   Braces: N/A  Respiratory Status: Room air     Occupational Performance: " "    Bed Mobility:    Patient completed Supine to Sit with modified independence  Patient completed Sit to Supine with modified independence     Functional Mobility/Transfers:  Sit to stand: Mod I  Toilet transfer: Supervision  Functional Mobility: Pt performed walking from bed to bathroom at SBA level without RW due to pt insistent on not using RW.      Activities of Daily Living:  Feeding: Indep  Grooming: SBA standing at sink  LB Dressing: Min A  Toileting: SBA  Education on use of RW assisting with energy conservation and safety but pt insistent that she didn't need to use RW.  Pt reporting that she is walking to the bathroom throughout the day without calling for assist and "knows"she is not going to fall.      [facility] 6 Click ADL: 19    Treatment & Education:  Role of OT, POC, safety wtih ADL mobility to decrease fall risk, discharge recs and need for assist at home    Patient left HOB elevated with all lines intact, call button in reach, and nurse notified    GOALS:   Multidisciplinary Problems       Occupational Therapy Goals          Problem: Occupational Therapy    Goal Priority Disciplines Outcome Interventions   Occupational Therapy Goal     OT, PT/OT Ongoing, Progressing    Description: Goals to be met by: 9/3/22    Patient will increase functional independence with ADLs by performing:    LB dressing at SBA.    BSC/toilet transfers at SBA. MET 8/31/2022   ToIleting at SBA.  MET 8/31/2022   UB dressing at SBA.  MET 8/31/2022   Grooming sitting EOB at SBA.  MET 8/31/2022 in standing                         Time Tracking:     OT Date of Treatment: 09/01/22  OT Start Time: 1433  OT Stop Time: 1503  OT Total Time (min): 30 min    Billable Minutes:Self Care/Home Management 15  Therapeutic Activity 15    OT/FROILAN: OT          9/1/2022  "

## 2022-09-01 NOTE — NURSING
Pt removed oxygen said she did not think she needed it at this time, 02 sat noted to be 99% on room air will continue to monitor

## 2022-09-01 NOTE — PROGRESS NOTES
[facility] Nephrology Progress Note    Date of Admit: 2022    Chief Complaint/Reason for Consult     Shortness of Breath (Pt c/o difficulty breathing with associated weakness and nausea.  Pmh of CHF)    Reason for consult: Hyponatremia    Subjective:      History of Present Illness:  Odette Hart is a 66 y.o. female who  presented to [facility] on 22 with worsening shortness of breath. Has history of CHFrEF and has had multiple hospitalizations for dyspnea. BNP > 3k on presentation and pulmonary edema noted on CXR. EF of 10% on last Echo. Patient diuresed with IV Lasix but noted to have worsening hyponatremia prompting nephrology eval.    Interval history:       Had vague CP yesterday and EKG/Trop checked.  UOP 1150.  NA dropping again and BP remains soft.  Options discussed.  Discussed with Dr. Cordova in detail.  Hold midodrine and allow low BP.       Review of Systems:  Pertinent positives and negatives are listed in HPI.  All other systems are reviewed and are negative.     Objective:   Last 24 Hour Vital Signs:  BP  Min: 84/48  Max: 109/61  Temp  Av.2 °F (36.8 °C)  Min: 97.8 °F (36.6 °C)  Max: 98.7 °F (37.1 °C)  Pulse  Av  Min: 74  Max: 91  Resp  Av.3  Min: 16  Max: 19  SpO2  Av.3 %  Min: 96 %  Max: 100 %  Body mass index is 20.97 kg/m².  I/O last 3 completed shifts:  In: 710 [P.O.:710]  Out: 2150 [Urine:2150]    Physical Examination:  Gen: NAD, AAOx3  HEENT: NCAT, EOMI, PERRL, MM, pink conjunctiva  Neck: no thyroidmegaly, no LAD  Cardio: RRR, normal S1/S2, no MRG, JVP wnl  Lungs:  diminished.    Abd: soft non tender, non distended, normal bowel sounds, no hepatosplenomegaly  Ext: 2+ pulses B/L, no clubbing, cyanosis, edema  Skin: warm, dry, no rashes noted  Neuro: CN 2-12 grossly intact, UE/LE motor 5/5, sensation intact  Psych: conversational, responsive      Laboratory:  Most Recent Data:    Recent Labs   Lab 22  0202 22  0415 22  0515   NA  129*   < > 127*   K 4.7   < > 4.5   CL 98   < > 92*   CO2 21*   < > 25   BUN 23   < > 12   CREATININE 0.9   < > 0.8   CALCIUM 8.2*   < > 8.2*   PHOS 3.3  --   --     < > = values in this interval not displayed.             Radiology:  X-Ray Chest AP Portable   Final Result      Cardiomegaly and findings suggestive of pulmonary edema/CHF although correlation for infectious process advised.         Electronically signed by: Lucy Tovar MD   Date:    08/26/2022   Time:    03:21           Assessment and Plan:      Odette Hart is a 66 y.o.female    Hyponatremia-hypervolemic  - does have history of chronic hyponatremia with baseline around 129  - On 8/27 noted to have worsening level to 125. Held Lasix and repeated NA level and was up to 131 but suspect labs error and was stagnant at 127  - dosed samsca X1 for persistent <NA despite water restriction/lasix with initial improvement and dropped again.  -redosed samsca with great response (3200 cc) and   -difficult to gauge outpt treatment regimen.  -consider samsca twice weekly vs lasix/danielito and monitoring of BMP however dropped with later.  -Na down to 127 again so will start Samsca twice weekly, hold danielito, but continue lasix.       Acute on chronic systolic heart failure  - severely reduced EF of 10% earlier this month  - Cardio following  - BP likely too low to tolerate ACE/ARB but defer to Cards  - see below.    Hypotension  - Remains on Coreg  -as above  -remains low and added low dose midodrine.   Will hold and defer hypotension to Dr. Cordova.    Normocytic Anemia:  -RIGO noted.  -dosed venofer.    End stage HF as above:  -appropriate for home based palliative care and pt now open to it.  -consult placed.       See above

## 2022-09-01 NOTE — NURSING
Md notified pt c/o of pain requesting tramadol, BP still low approved to give med  , 1348 med given per orders will monitor pt.

## 2022-09-01 NOTE — PROGRESS NOTES
"[facility] Medicine  Progress Note    Patient Name: Odette Hart  MRN: [id number]  Patient Class: IP- Inpatient   Admission Date: 8/26/2022  Length of Stay: 6 days  Attending Physician: Marco Antonio Alvarado MD  Primary Care Provider: Braxton Barragan MD        Subjective:     Principal Problem:Acute combined systolic and diastolic congestive heart failure        HPI:  Per Michael Long, NP:  "The patient is a 66 y.o. female with a past medical history of MI, HLD, CAD, cardiomyopathy, HTN, and CHF who presents for evaluation of shortness of breath onset earlier today. She reports "I've been feeling bad," endorsing associated symptoms of nausea and weakness. She states she has been near vomiting but denies any vomiting episodes. She reports elevated temperature at 99 F last night. She states her current presentations feel similar to her previous hospitalizations for dyspnea. Pt is regularly followed by cardiology. She denies pain elsewhere and other somatic complaints.  BNP elevated to greater than 3000 with pulmonary edema noted on CXR.  She will be admitted for further management of her acute on chronic systolic congestive heart failure."      Overview/Hospital Course:  Odette Hart was admitted for CHF exacerbation. Diuresing with IVP lasix, cardiology following along. During stay her chronic hyponatremia worsened, nephrology consulted. Holding lasix, giving tolvaptan x 2 with improvement in sodium.       Given her advanced heart failure, palliative care consulted and recommending palliative based care at home (referral placed)      Interval History: Patient is awake and alert this morning.  Feels her breathing is stable to overall improved, but still on O2 2L NC.      Review of Systems   Constitutional:  Negative for chills and fever.   HENT:  Negative for ear pain.    Eyes:  Negative for pain.   Respiratory:  Positive for shortness of breath. Negative for cough and wheezing.  "   Cardiovascular:  Negative for chest pain, palpitations and leg swelling.   Gastrointestinal:  Negative for abdominal pain, constipation, diarrhea, nausea and vomiting.   Genitourinary:  Negative for difficulty urinating and dysuria.   Musculoskeletal:  Negative for arthralgias and gait problem.   Neurological:  Positive for weakness. Negative for dizziness and headaches.   Psychiatric/Behavioral:  Negative for agitation and confusion.    Objective:     Vital Signs (Most Recent):  Temp: 98.6 °F (37 °C) (09/01/22 0730)  Pulse: 72 (09/01/22 1000)  Resp: 18 (09/01/22 0730)  BP: (!) 99/52 (09/01/22 0855)  SpO2: 98 % (09/01/22 0730)   Vital Signs (24h Range):  Temp:  [97.8 °F (36.6 °C)-98.7 °F (37.1 °C)] 98.6 °F (37 °C)  Pulse:  [71-91] 72  Resp:  [16-19] 18  SpO2:  [96 %-100 %] 98 %  BP: ()/(47-61) 99/52     Weight: 53.7 kg (118 lb 6.2 oz)  Body mass index is 20.97 kg/m².    Intake/Output Summary (Last 24 hours) at 9/1/2022 1036  Last data filed at 9/1/2022 0900  Gross per 24 hour   Intake 840 ml   Output 1150 ml   Net -310 ml        Physical Exam  Vitals and nursing note reviewed.   Constitutional:       General: She is not in acute distress.     Appearance: Normal appearance. She is ill-appearing (chronically).   HENT:      Head: Normocephalic and atraumatic.      Right Ear: External ear normal.      Left Ear: External ear normal.      Nose: Nose normal.      Mouth/Throat:      Mouth: Mucous membranes are moist.      Pharynx: Oropharynx is clear.   Eyes:      General: No scleral icterus.     Pupils: Pupils are equal, round, and reactive to light.   Cardiovascular:      Rate and Rhythm: Normal rate and regular rhythm.   Pulmonary:      Effort: Pulmonary effort is normal. No respiratory distress.      Breath sounds: Rales present. No wheezing.   Abdominal:      General: Abdomen is flat. There is no distension.      Palpations: Abdomen is soft.      Tenderness: There is no abdominal tenderness.      Comments:  +colostomy   Musculoskeletal:      Cervical back: Normal range of motion and neck supple.      Right lower leg: No edema.      Left lower leg: No edema.   Skin:     General: Skin is warm and dry.   Neurological:      General: No focal deficit present.      Mental Status: She is alert and oriented to person, place, and time. Mental status is at baseline.   Psychiatric:         Mood and Affect: Mood normal.         Behavior: Behavior normal.         Thought Content: Thought content normal.       Significant Labs: All pertinent labs within the past 24 hours have been reviewed.    Significant Imaging: I have reviewed all pertinent imaging results/findings within the past 24 hours.      Assessment/Plan:      * Acute combined systolic and diastolic congestive heart failure  Patient with CAD s/p MI with known CM with Chronic systolic and diastolic CHF who presented with CHF.  Followed by Freddy Arenas MD at [facility].  BNP was 3698 and CXR with pulmonary edema on admission.  Repeat TTE on 6/4/2022 with EF down to 10% and Grade II DD with low normal RVSF.  Started on Lasix for diuresis, but course complicated with hypotension and hyponatremia.  Followed by Cardiology and Nephrology.  Urine output decent at 1150ml.  Still with mild crackles and on 2L O2NC.    Home Meds:  Carvedilol 3.125mg bid and Lasix 40mg  Hospital Meds:  Carvedilol 3.125mg bid, Lasix 20mg qday    Plan:  Follow up with Cardiology and Nephrology recommendations - continue diuresis.  Consider SGLT2 (nonformulary inpatient).  Continue with current regimen  Continue with Low Na diet and Fluid restrictions  Monitor I&Os      Hyponatremia  Chronic with recent baseline of 129 and likely from Cardiorenal in origin.  Received Tolvaptan x 2 and now starting biw.  Followed by Nephrology.    Plan:  Follow up with Nephrology recommendations - Tolvaptan 30mg twice weekly  Follow BMP           Encounter for palliative care  Followed by Palliative Care  Patient is Full  Code      Debility  PT/OT consulted -  PT/OT on discharge      Primary hypertension  Home Meds:  Carvedilol 3.125mg bid and Lasix 4omg  SBP runs low outpatient.  Stable at 93/47 this morning  -Continue Carvedilol and Lasix   -Monitor and adjust as needed      Familial hypercholesterolemia  Has previous Statin intolerance.  -Continue Zetia      Coronary artery disease of native artery of native heart with stable angina pectoris  S/p MI and s/p stents  Home Meds:  ASA 81mg/Plavix 75mg, Carvedilol 3.125mg bid.  Has Statin intolerance.  -Continue Home Meds        VTE Risk Mitigation (From admission, onward)         Ordered     enoxaparin injection 40 mg  Daily         08/26/22 0325     IP VTE HIGH RISK PATIENT  Once         08/26/22 0325     Place sequential compression device  Until discontinued         08/26/22 0325                Discharge Planning   SIVAN:      Code Status: Full Code   Is the patient medically ready for discharge?:     Reason for patient still in hospital (select all that apply): Patient trending condition, Treatment and Consult recommendations  Discharge Plan A: Home with family                  Marco Antonio Alvarado MD  Department of Hospital Medicine   [facility] ([facility])

## 2022-09-02 NOTE — ASSESSMENT & PLAN NOTE
Patient with CAD s/p MI with known CM with Chronic systolic and diastolic CHF who presented with CHF.  Followed by Freddy Arenas MD at [facility].  BNP was 3698 and CXR with pulmonary edema on admission.  Repeat TTE on 6/4/2022 with EF down to 10% and Grade II DD with low normal RVSF.  Started on Lasix for diuresis, but course complicated with hypotension and hyponatremia.  Followed by Cardiology and Nephrology.  Urine output decent at 1550ml.  Still with mild crackles and on 2L O2NC.    Home Meds:  Carvedilol 3.125mg bid and Lasix 40mg  Hospital Meds:  Carvedilol 3.125mg bid, Lasix 40mg qday    Plan:  Follow up with Cardiology and Nephrology recommendations - continue diuresis.  Consider SGLT2 (nonformulary inpatient).  Continue with current regimen  Continue with Fluid restrictions  Monitor I&Os

## 2022-09-02 NOTE — SUBJECTIVE & OBJECTIVE
Interval History: Patient is awake and alert this morning.  Feels her breathing is stable to overall improved, but still on O2 2L NC.  No adverse events reported overnight.    Review of Systems   Constitutional:  Negative for chills and fever.   HENT:  Negative for ear pain.    Eyes:  Negative for pain.   Respiratory:  Positive for shortness of breath. Negative for cough and wheezing.    Cardiovascular:  Negative for chest pain, palpitations and leg swelling.   Gastrointestinal:  Negative for abdominal pain, constipation, diarrhea, nausea and vomiting.   Genitourinary:  Negative for difficulty urinating and dysuria.   Musculoskeletal:  Negative for arthralgias and gait problem.   Neurological:  Positive for weakness. Negative for dizziness and headaches.   Psychiatric/Behavioral:  Negative for agitation and confusion.    Objective:     Vital Signs (Most Recent):  Temp: 98.2 °F (36.8 °C) (09/02/22 0728)  Pulse: 67 (09/02/22 1000)  Resp: 16 (09/02/22 0728)  BP: (!) 105/51 (09/02/22 0728)  SpO2: 99 % (09/02/22 1000)   Vital Signs (24h Range):  Temp:  [98.1 °F (36.7 °C)-98.5 °F (36.9 °C)] 98.2 °F (36.8 °C)  Pulse:  [] 67  Resp:  [16-20] 16  SpO2:  [93 %-99 %] 99 %  BP: ()/(42-51) 105/51     Weight: 53.7 kg (118 lb 6.2 oz)  Body mass index is 20.97 kg/m².    Intake/Output Summary (Last 24 hours) at 9/2/2022 1118  Last data filed at 9/2/2022 1100  Gross per 24 hour   Intake 820 ml   Output 2000 ml   Net -1180 ml        Physical Exam  Vitals and nursing note reviewed.   Constitutional:       General: She is not in acute distress.     Appearance: Normal appearance. She is ill-appearing (chronically).   HENT:      Head: Normocephalic and atraumatic.      Right Ear: External ear normal.      Left Ear: External ear normal.      Nose: Nose normal.      Mouth/Throat:      Mouth: Mucous membranes are moist.      Pharynx: Oropharynx is clear.   Eyes:      General: No scleral icterus.     Pupils: Pupils are equal,  round, and reactive to light.   Cardiovascular:      Rate and Rhythm: Normal rate and regular rhythm.   Pulmonary:      Effort: Pulmonary effort is normal. No respiratory distress.      Breath sounds: Rales present. No wheezing.   Abdominal:      General: Abdomen is flat. There is no distension.      Palpations: Abdomen is soft.      Tenderness: There is no abdominal tenderness.      Comments: +colostomy   Musculoskeletal:      Cervical back: Normal range of motion and neck supple.      Right lower leg: No edema.      Left lower leg: No edema.   Skin:     General: Skin is warm and dry.   Neurological:      General: No focal deficit present.      Mental Status: She is alert and oriented to person, place, and time. Mental status is at baseline.   Psychiatric:         Mood and Affect: Mood normal.         Behavior: Behavior normal.         Thought Content: Thought content normal.       Significant Labs: All pertinent labs within the past 24 hours have been reviewed.    Significant Imaging: I have reviewed all pertinent imaging results/findings within the past 24 hours.

## 2022-09-02 NOTE — PT/OT/SLP PROGRESS
Occupational Therapy   Treatment    Name: Odette Hart  MRN: [id number]  Admitting Diagnosis:  Acute combined systolic and diastolic congestive heart failure       Recommendations:     Discharge Recommendations: home health OT, home health PT (Assist from friends, Home Health Aide)  Discharge Equipment Recommendations:  bedside commode  Barriers to discharge:  Inaccessible home environment, Decreased caregiver support (Current functional level)    Assessment:     Odette Hart is a 66 y.o. female with a medical diagnosis of Acute combined systolic and diastolic congestive heart failure.  She reporting she is very frustrated with her limited fluid intake.  Performance deficits affecting function are weakness, impaired endurance, impaired self care skills, impaired functional mobility, gait instability, impaired balance, decreased lower extremity function, decreased ROM, edema, impaired skin, impaired cardiopulmonary response to activity, decreased safety awareness.     Rehab Prognosis:   Guarded due to medical status ; patient would benefit from acute skilled OT services to address these deficits and reach maximum level of function.       Plan:     Patient to be seen 4 x/week to address the above listed problems via self-care/home management, therapeutic activities  Plan of Care Expires: 09/25/22  Plan of Care Reviewed with: patient    Subjective     Pain/Comfort:  Pain Rating 1:  (Did not rate pain.)  Location 1: sacral spine  Pain Addressed 1: Reposition, Distraction, Cessation of Activity  Pain Rating Post-Intervention 1:  (Did not rate pain.)    Objective:     Communicated with:  Patient found sitting edge of bed with colostomy, telemetry, peripheral IV upon OT entry to room.    General Precautions: Standard, fall, respiratory   Orthopedic Precautions:N/A   Braces: N/A  Respiratory Status: Room air     Occupational Performance:     Bed Mobility:    NT     Functional Mobility/Transfers:  Sit to stand: Mod  I level  Toilet transfer: SBA   Functional Mobility: SBA without AD in room. No LOB.  Pt not receptive to using RW.    Activities of Daily Living:  Feeding: Indep; Pt reporting she is always thirsty and she doesn't feel like she gets enough to drink.  Recommended pt suck on hard candy and when she gets her meal, drink her drink at end of meal instead of throughout the meal.  Grooming: Supervision standing at sink  LB dressing: Donned right sock with SBA and left sock with Total A; pt attempted to don left sock several times but by the time she could reach her foot by propping it up on the bed or crossing her foot over her right knee, she would fatigue; After 4 attempts to don left sock, pt reported she was too fatigued to complete task.  Toileting: SBA      [facility] 6 Click ADL: 19    Treatment & Education:  Role of OT, POC, safety with ADL and ADL mobility, need for assist when returning home    Patient left sitting edge of bed with all lines intact, call button in reach, and FRANCIS Barrios present    GOALS:   Multidisciplinary Problems       Occupational Therapy Goals          Problem: Occupational Therapy    Goal Priority Disciplines Outcome Interventions   Occupational Therapy Goal     OT, PT/OT Ongoing, Progressing    Description: Goals to be met by: 9/3/22    Patient will increase functional independence with ADLs by performing:    LB dressing at A.    BSC/toilet transfers at [facility]. MET 8/31/2022   ToIleting at [facility].  MET 8/31/2022   UB dressing at A.  MET 8/31/2022   Grooming sitting EOB at A.  MET 8/31/2022 in standing                         Time Tracking:     OT Date of Treatment: 09/02/22  OT Start Time: 1233  OT Stop Time: 1256  OT Total Time (min): 23 min    Billable Minutes:Self Care/Home Management 23    OT/FROILAN: OT          9/2/2022

## 2022-09-02 NOTE — PLAN OF CARE
Problem: Physical Therapy  Goal: Physical Therapy Goal  Description: Goals to be met by: 2022    Patient will increase functional independence with mobility by performin. Sit<>stand with supervision with least restrictive AD. - MET 22  2. Gait x 200 feet with SBA with least restrictive AD. - MET 22  3. Ascend/descend 4 step(s) with least restrictive assistive device and CGA. - MET 22     Sit>stand with SPV and no AD, RW  Amb 200' with RW and SBA, no LoB  Ascended/descended 4 steps with B HR and SBA ascending, CGA descending (x 2 trials)  Pt demos good mobility and has met all goals this visit  Rec HHPT  Outcome: Met

## 2022-09-02 NOTE — NURSING
"Spoke with pt concerning her fluid restriction diet and her issues with the dietary department. I read the order to the pt about no plain water, but that she can have diet soda or juice pt upset that dietary will not send her drinks and food she is ordering I explained to pt that they have to go by her low sodium diet and her fluid restriction guidelines as well and that if it does not fit in her parameters they can not send it up.pt still refuses to understand the restrictions placed on her by her physicians. Pt stated"I just wanna go home and be left alone"  I advised pt to call the kitchen to order dinner so she relieves food , pt refuses at this time   "

## 2022-09-02 NOTE — PT/OT/SLP PROGRESS
Physical Therapy Treatment    Patient Name:  Odette Hart   MRN:  [id number]    Recommendations:     Discharge Recommendations:  home health PT, home health OT   Discharge Equipment Recommendations: other (see comments) (TBD pending patient progress)   Barriers to discharge:  functional mobility impairments    Assessment:     Odette Hart is a 66 y.o. female admitted with a medical diagnosis of Acute combined systolic and diastolic congestive heart failure.  She presents with the following impairments/functional limitations:  weakness, edema, gait instability, impaired balance, impaired cardiopulmonary response to activity, impaired endurance, impaired functional mobility, impaired self care skills, impaired skin, decreased lower extremity function, decreased ROM, decreased safety awareness.    Sit>stand with RW, no AD and SPV  Amb 200' with RW and SBA, no LoB  Ascended/descended 4 steps with B HR and SBA ascending, CGA descending (x 2 trials)  Pt demos good mobility, met all goals today    Rehab Prognosis: Good; patient would benefit from acute skilled PT services to address these deficits and reach maximum level of function.    Recent Surgery: * No surgery found *      Plan:     During this hospitalization, patient to be seen 3 x/week to address the identified rehab impairments via gait training, therapeutic activities, therapeutic exercises, neuromuscular re-education and progress toward the following goals:    Plan of Care Expires:  09/28/22    Subjective     Chief Complaint: fluid restriction  Patient/Family Comments/goals: pt agreeable to therapy  Pain/Comfort:  Pain Rating 1: 0/10  Pain Rating Post-Intervention 1: 0/10      Objective:     Communicated with nurse Marcano prior to session.  Patient found sitting edge of bed with colostomy, telemetry, peripheral IV upon PT entry to room.     General Precautions: Standard, fall, respiratory   Orthopedic Precautions:N/A   Braces:    Respiratory Status:  Room air     Functional Mobility:  Transfers:     Sit to Stand:  supervision with rolling walker  Gait: 200' with RW and SBA, no LoB  Stairs:  Pt ascended/descended 4 stair(s) with No Assistive Device with bilateral handrails with Stand-by Assistance and Contact Guard Assistance.       AM-PAC 6 CLICK MOBILITY  Turning over in bed (including adjusting bedclothes, sheets and blankets)?: 4  Sitting down on and standing up from a chair with arms (e.g., wheelchair, bedside commode, etc.): 3  Moving from lying on back to sitting on the side of the bed?: 4  Moving to and from a bed to a chair (including a wheelchair)?: 3  Need to walk in hospital room?: 3  Climbing 3-5 steps with a railing?: 3  Basic Mobility Total Score: 20       Therapeutic Activities and Exercises:   Seated therex: heel raises, LAQ x 10  Gait and stair training as noted    Patient left sitting edge of bed with all lines intact, call button in reach, and nurse Krys notified..    GOALS:   Multidisciplinary Problems       Physical Therapy Goals       Not on file              Multidisciplinary Problems (Resolved)          Problem: Physical Therapy    Goal Priority Disciplines Outcome Goal Variances Interventions   Physical Therapy Goal   (Resolved)     PT, PT/OT Met     Description: Goals to be met by: 2022    Patient will increase functional independence with mobility by performin. Sit<>stand with supervision with least restrictive AD. - MET 22  2. Gait x 200 feet with SBA with least restrictive AD. - MET 22  3. Ascend/descend 4 step(s) with least restrictive assistive device and CGA. - MET 22                          Time Tracking:     PT Received On: 22  PT Start Time: 1258     PT Stop Time: 1321  PT Total Time (min): 23 min     Billable Minutes: Gait Training 23    Treatment Type: Treatment  PT/PTA: PTA     PTA Visit Number: 2     2022

## 2022-09-02 NOTE — ASSESSMENT & PLAN NOTE
Home Meds:  Carvedilol 3.125mg bid and Lasix 40mg  SBP runs low outpatient.  Stable at 93/47 this morning  -Continue Carvedilol and Lasix   -Monitor and adjust as needed

## 2022-09-02 NOTE — PROGRESS NOTES
"[facility] Medicine  Progress Note    Patient Name: Odette Hart  MRN: [id number]  Patient Class: IP- Inpatient   Admission Date: 8/26/2022  Length of Stay: 7 days  Attending Physician: Marco Antonio Alvarado MD  Primary Care Provider: Braxton Barragan MD        Subjective:     Principal Problem:Acute combined systolic and diastolic congestive heart failure        HPI:  Per Michael Long, NP:  "The patient is a 66 y.o. female with a past medical history of MI, HLD, CAD, cardiomyopathy, HTN, and CHF who presents for evaluation of shortness of breath onset earlier today. She reports "I've been feeling bad," endorsing associated symptoms of nausea and weakness. She states she has been near vomiting but denies any vomiting episodes. She reports elevated temperature at 99 F last night. She states her current presentations feel similar to her previous hospitalizations for dyspnea. Pt is regularly followed by cardiology. She denies pain elsewhere and other somatic complaints.  BNP elevated to greater than 3000 with pulmonary edema noted on CXR.  She will be admitted for further management of her acute on chronic systolic congestive heart failure."      Overview/Hospital Course:  Odette Hart was admitted for CHF exacerbation. Diuresing with IVP lasix, cardiology following along. During stay her chronic hyponatremia worsened, nephrology consulted. Holding lasix, giving tolvaptan x 2 with improvement in sodium.       Given her advanced heart failure, palliative care consulted and recommending palliative based care at home (referral placed)      Interval History: Patient is awake and alert this morning.  Feels her breathing is stable to overall improved, but still on O2 2L NC.  No adverse events reported overnight.    Review of Systems   Constitutional:  Negative for chills and fever.   HENT:  Negative for ear pain.    Eyes:  Negative for pain.   Respiratory:  Positive for shortness of " breath. Negative for cough and wheezing.    Cardiovascular:  Negative for chest pain, palpitations and leg swelling.   Gastrointestinal:  Negative for abdominal pain, constipation, diarrhea, nausea and vomiting.   Genitourinary:  Negative for difficulty urinating and dysuria.   Musculoskeletal:  Negative for arthralgias and gait problem.   Neurological:  Positive for weakness. Negative for dizziness and headaches.   Psychiatric/Behavioral:  Negative for agitation and confusion.    Objective:     Vital Signs (Most Recent):  Temp: 98.2 °F (36.8 °C) (09/02/22 0728)  Pulse: 67 (09/02/22 1000)  Resp: 16 (09/02/22 0728)  BP: (!) 105/51 (09/02/22 0728)  SpO2: 99 % (09/02/22 1000)   Vital Signs (24h Range):  Temp:  [98.1 °F (36.7 °C)-98.5 °F (36.9 °C)] 98.2 °F (36.8 °C)  Pulse:  [] 67  Resp:  [16-20] 16  SpO2:  [93 %-99 %] 99 %  BP: ()/(42-51) 105/51     Weight: 53.7 kg (118 lb 6.2 oz)  Body mass index is 20.97 kg/m².    Intake/Output Summary (Last 24 hours) at 9/2/2022 1118  Last data filed at 9/2/2022 1100  Gross per 24 hour   Intake 820 ml   Output 2000 ml   Net -1180 ml        Physical Exam  Vitals and nursing note reviewed.   Constitutional:       General: She is not in acute distress.     Appearance: Normal appearance. She is ill-appearing (chronically).   HENT:      Head: Normocephalic and atraumatic.      Right Ear: External ear normal.      Left Ear: External ear normal.      Nose: Nose normal.      Mouth/Throat:      Mouth: Mucous membranes are moist.      Pharynx: Oropharynx is clear.   Eyes:      General: No scleral icterus.     Pupils: Pupils are equal, round, and reactive to light.   Cardiovascular:      Rate and Rhythm: Normal rate and regular rhythm.   Pulmonary:      Effort: Pulmonary effort is normal. No respiratory distress.      Breath sounds: Rales present. No wheezing.   Abdominal:      General: Abdomen is flat. There is no distension.      Palpations: Abdomen is soft.      Tenderness:  There is no abdominal tenderness.      Comments: +colostomy   Musculoskeletal:      Cervical back: Normal range of motion and neck supple.      Right lower leg: No edema.      Left lower leg: No edema.   Skin:     General: Skin is warm and dry.   Neurological:      General: No focal deficit present.      Mental Status: She is alert and oriented to person, place, and time. Mental status is at baseline.   Psychiatric:         Mood and Affect: Mood normal.         Behavior: Behavior normal.         Thought Content: Thought content normal.       Significant Labs: All pertinent labs within the past 24 hours have been reviewed.    Significant Imaging: I have reviewed all pertinent imaging results/findings within the past 24 hours.      Assessment/Plan:      * Acute combined systolic and diastolic congestive heart failure  Patient with CAD s/p MI with known CM with Chronic systolic and diastolic CHF who presented with CHF.  Followed by Freddy Arenas MD at [facility].  BNP was 3698 and CXR with pulmonary edema on admission.  Repeat TTE on 6/4/2022 with EF down to 10% and Grade II DD with low normal RVSF.  Started on Lasix for diuresis, but course complicated with hypotension and hyponatremia.  Followed by Cardiology and Nephrology.  Urine output decent at 1550ml.  Still with mild crackles and on 2L O2NC.    Home Meds:  Carvedilol 3.125mg bid and Lasix 40mg  Hospital Meds:  Carvedilol 3.125mg bid, Lasix 40mg qday    Plan:  Follow up with Cardiology and Nephrology recommendations - continue diuresis.  Consider SGLT2 (nonformulary inpatient).  Continue with current regimen  Continue with Fluid restrictions  Monitor I&Os      Hyponatremia  Chronic with recent baseline of 129 and likely from Cardiorenal in origin.  Received Tolvaptan x 2 and now starting biw.  Followed by Nephrology.  Na stable at 129 today from 127 yesterday    Plan:  Follow up with Nephrology recommendations - Tolvaptan increased to 30mg three times  weekly  Follow BMP           Encounter for palliative care  Followed by Palliative Care  Patient is Full Code      Debility  PT/OT consulted -  PT/OT on discharge      Primary hypertension  Home Meds:  Carvedilol 3.125mg bid and Lasix 40mg  SBP runs low outpatient.  Stable at 93/47 this morning  -Continue Carvedilol and Lasix   -Monitor and adjust as needed      Familial hypercholesterolemia  Has previous Statin intolerance.  -Continue Zetia      Coronary artery disease of native artery of native heart with stable angina pectoris  S/p MI and s/p stents  Home Meds:  ASA 81mg/Plavix 75mg, Carvedilol 3.125mg bid.  Has Statin intolerance.  -Continue Home Meds        VTE Risk Mitigation (From admission, onward)         Ordered     Place JESS hose  Until discontinued         09/02/22 0910     enoxaparin injection 40 mg  Daily         08/26/22 0325     IP VTE HIGH RISK PATIENT  Once         08/26/22 0325     Place sequential compression device  Until discontinued         08/26/22 0325                Discharge Planning   SIVAN:      Code Status: Full Code   Is the patient medically ready for discharge?:     Reason for patient still in hospital (select all that apply): Patient trending condition, Treatment and Consult recommendations  Discharge Plan A: Home with family                  Marco Antonio Alvarado MD  Department of Hospital Medicine   [facility] - Med Surg ([facility])

## 2022-09-02 NOTE — PROGRESS NOTES
"    Cardiology Progress Note    9/2/2022  10:45 AM    Subjective/Interim:      No complaints.     Objective:   24-hour Vitals:  Temp:  [98.1 °F (36.7 °C)-98.5 °F (36.9 °C)] 98.2 °F (36.8 °C)  Pulse:  [] 73  Resp:  [16-20] 16  SpO2:  [93 %-99 %] 99 %  BP: ()/(42-51) 105/51    Physical Examination:  Vitals: BP (!) 105/51 (BP Location: Right arm, Patient Position: Sitting)   Pulse 73   Temp 98.2 °F (36.8 °C) (Oral)   Resp 16   Ht 5' 3" (1.6 m)   Wt 53.7 kg (118 lb 6.2 oz)   LMP  (LMP Unknown)   SpO2 99%   Breastfeeding No   BMI 20.97 kg/m²     Physical Exam  Constitutional:       General: She is not in acute distress.     Appearance: Normal appearance. She is well-developed. She is not ill-appearing or toxic-appearing.   Eyes:      Conjunctiva/sclera:      Right eye: Right conjunctiva is not injected. No hemorrhage.     Left eye: Left conjunctiva is not injected. No hemorrhage.  Neck:      Vascular: No JVD.   Cardiovascular:      Rate and Rhythm: Normal rate and regular rhythm.      Heart sounds: S1 normal and S2 normal. Murmur heard.   Systolic murmur is present with a grade of 2/6 at the lower left sternal border.     Gallop present. S3 sounds present.      Comments: ICD left chest wall.  Pulmonary:      Effort: Pulmonary effort is normal.      Breath sounds: No rales.   Chest:      Chest wall: No swelling or tenderness.   Abdominal:      Tenderness: There is no abdominal tenderness.      Comments: Colostomy.   Musculoskeletal:      Right ankle: No swelling.      Left ankle: No swelling.   Skin:     General: Skin is warm and dry.      Findings: No rash.   Neurological:      General: No focal deficit present.      Mental Status: She is alert and oriented to person, place, and time. She is not disoriented.   Psychiatric:         Attention and Perception: Attention normal.         Mood and Affect: Mood normal.         Speech: Speech normal.         Behavior: Behavior normal.         Thought Content: " Thought content normal.         Cognition and Memory: Cognition and memory normal.         Judgment: Judgment normal.         Intake/Output Summary (Last 24 hours) at 9/2/2022 1045  Last data filed at 9/2/2022 0852  Gross per 24 hour   Intake 700 ml   Output 2000 ml   Net -1300 ml       Laboratory:  Trended Lab Data:  No results for input(s): WBC, HGB, HCT, PLT in the last 168 hours.    Recent Labs   Lab 08/30/22  0937 08/31/22  0523 09/01/22  0515 09/02/22  0611   NA  --  129* 127* 129*   K  --  4.3 4.5 4.7   CL  --  93* 92* 91*   CO2  --  25 25 27   BUN  --  10 12 13   GLU  --  107 131* 88   CALCIUM  --  8.4* 8.2* 8.3*   MG 1.8  --   --   --        Recent Labs   Lab 08/29/22  0517   PROT 6.7   ALBUMIN 1.9*   BILITOT 1.1*   AST 43*   ALT 34   ALKPHOS 108       No results for input(s): PROTIME, PTT, INR in the last 168 hours.    Cardiac:   Recent Labs   Lab 08/30/22  0551 09/01/22  0110   TROPONINI  --  0.017   BNP 3,420*  --        FLP:   Lab Results   Component Value Date    CHOL 106 (L) 08/30/2022    HDL 13 (L) 08/30/2022    LDLCALC 70.6 08/30/2022    TRIG 112 08/30/2022    CHOLHDL 12.3 (L) 08/30/2022     DM:   Lab Results   Component Value Date    HGBA1C 5.0 08/26/2022    HGBA1C 4.9 06/08/2022    HGBA1C 5.0 05/24/2022    LDLCALC 70.6 08/30/2022    CREATININE 0.7 09/02/2022     Thyroid:   Lab Results   Component Value Date    TSH 1.682 08/09/2022    FREET4 1.05 10/02/2019     Anemia:   Lab Results   Component Value Date    IRON 22 (L) 08/29/2022    TIBC 280 08/29/2022    FERRITIN 438 (H) 08/29/2022    KHJLAZUX83 1452 (H) 08/29/2022    FOLATE 12.1 08/29/2022     Urinalysis:   Lab Results   Component Value Date    LABURIN ENTEROBACTER CLOACAE  10,000 - 49,999 cfu/ml   (A) 08/01/2022    LABURIN (A) 08/01/2022     KLEBSIELLA OXYTOCA  10,000 - 49,999 cfu/ml  No other significant isolate      COLORU Yellow 08/28/2022    CLARITYU Cloudy 12/16/2021    SPECGRAV 1.010 08/28/2022    NITRITE Negative 08/28/2022    KETONESU  Negative 08/28/2022    UROBILINOGEN Negative 08/28/2022     @      Other Results:  EKG (my interpretation):    TELEMETRY:  Sinus rhythm rate 70-80    Echo:   Results for orders placed or performed during the hospital encounter of 08/01/22   Echo   Result Value Ref Range    Ascending aorta 2.82 cm    STJ 2.71 cm    AV mean gradient 4 mmHg    Ao peak thad 1.36 m/s    Ao VTI 21.57 cm    IVS 0.79 0.6 - 1.1 cm    LA size 5.19 cm    Left Atrium Major Axis 5.95 cm    Left Atrium Minor Axis 5.93 cm    LVIDd 6.26 (A) 3.5 - 6.0 cm    LVIDs 5.85 (A) 2.1 - 4.0 cm    LVOT diameter 1.76 cm    LVOT peak VTI 14.62 cm    Posterior Wall 0.66 0.6 - 1.1 cm    MV Peak A Thad 0.62 m/s    E wave deceleration time 114.74 msec    MV Peak E Thad 0.82 m/s    RA Major Axis 4.63 cm    RA Width 3.11 cm    RVDD 3.51 cm    Sinus 2.60 cm    TAPSE 1.40 cm    TR Max Thad 3.20 m/s    TDI LATERAL 0.03 m/s    TDI SEPTAL 0.05 m/s    LA WIDTH 4.35 cm    MV stenosis pressure 1/2 time 33.28 ms    LV Diastolic Volume 198.02 mL    LV Systolic Volume 169.54 mL    RV S' 8.58 cm/s    LVOT peak thad 1.09 m/s    LA volume (mod) 71.41 cm3    LV LATERAL E/E' RATIO 27.33 m/s    LV SEPTAL E/E' RATIO 16.40 m/s    FS 7 %    LA volume 113.99 cm3    LV mass 177.82 g    Left Ventricle Relative Wall Thickness 0.21 cm    AV valve area 1.65 cm2    AV Velocity Ratio 0.80     AV index (prosthetic) 0.68     MV valve area p 1/2 method 6.61 cm2    E/A ratio 1.32     Mean e' 0.04 m/s    LVOT area 2.4 cm2    LVOT stroke volume 35.55 cm3    AV peak gradient 7 mmHg    E/E' ratio 20.50 m/s    Triscuspid Valve Regurgitation Peak Gradient 41 mmHg    BSA 1.54 m2    LV Systolic Volume Index 109.4 mL/m2    LV Diastolic Volume Index 127.75 mL/m2    LA Volume Index 73.5 mL/m2    LV Mass Index 115 g/m2    LA Volume Index (Mod) 46.1 mL/m2    EF 10 %    Narrative    · The left ventricle is moderately enlarged with eccentric hypertrophy and   severely decreased systolic function.  · The estimated  ejection fraction is 10%.  · Grade II left ventricular diastolic dysfunction.  · Moderate left atrial enlargement.  · Moderate mitral regurgitation.  · No obvious endocarditis  · Normal right ventricular size with low normal right ventricular systolic   function.  · Mild to moderate tricuspid regurgitation.  · Mild aortic regurgitation.  · There are segmental left ventricular wall motion abnormalities. Akinetic   apex. No thrombus.          Radiology:  X-Ray Chest 1 View    Result Date: 9/1/2022  EXAMINATION: XR CHEST 1 VIEW CLINICAL HISTORY: CHF; TECHNIQUE: Single frontal view of the chest was performed. COMPARISON: Chest radiograph 08/26/2022 FINDINGS: Pacing device is noted in the left chest, with transvenous leads remaining in stable position. Unchanged enlargement of the cardiomediastinal silhouette.  Lungs appear symmetrically expanded, with slight improvement of interstitial and parenchymal attenuation, as well as patchy bibasilar opacities. Osseous structures appear intact.     Slight improvement of aeration of the lungs bilaterally Electronically signed by: Asael Ortiz Date:    09/01/2022 Time:    11:27    CTA Chest Non Coronary    Result Date: 8/3/2022  EXAMINATION: CTA CHEST NON CORONARY CLINICAL HISTORY: Pulmonary embolism (PE) suspected, positive D-dimer;SOB, elevated d dimer; TECHNIQUE: Low dose axial images, sagittal and coronal reformations were obtained from the thoracic inlet to the lung bases following the IV administration of 75 mL of Omnipaque 350.  Contrast timing was optimized to evaluate the pulmonary arteries.  Maximum intensity projection images were provided for review. COMPARISON: Chest radiograph 08/01/2022.  CT abdomen pelvis 08/01/2022.  Nuclear medicine lung ventilation scan 05/18/2022. FINDINGS: Pulmonary vasculature: Satisfactory opacification of the pulmonary arterial system with no filling defect to the segmental level. Aorta: Left-sided aortic arch.  No aneurysm.   Atherosclerosis of the aortic arch, coronary arteries, and descending aorta. Base of Neck: No significant abnormality. Thoracic soft tissues: Left-sided cardiac pacer device with cardiac leads are noted. Heart: Mild-moderate enlargement of the left atrium..  No pericardial effusion.  There is reflux of contrast into the hepatic veins consistent with right heart failure versus tricuspid valvular disease. Nafisa/Mediastinum: No pathologic adam enlargement. Airways: The large airways are patent. No foci of endobronchial filling. Lungs/Pleura: Scattered ground-glass and consolidative opacities in the bilateral lower lobes, right greater than left.  Mild, smooth interlobular septal thickening.  Small-moderate right and trace-small left pleural effusion.  Dependent atelectasis in the bilateral lungs. Esophagus: Unremarkable Upper Abdomen: No abnormality of the partially imaged upper abdomen. Bones: No acute fracture. No suspicious lytic or sclerotic lesions.     No evidence of pulmonary thromboembolism to the subsegmental branches. Pulmonary edema with small right greater than left pleural effusions. Findings consistent with right heart failure and/or tricuspid valvular disease. Electronically signed by resident: Marbella Monaco Date:    08/03/2022 Time:    10:56 Electronically signed by: Nikki Valencia Date:    08/03/2022 Time:    11:42    CT Abdomen Pelvis With Contrast    Result Date: 8/18/2022  EXAMINATION: CT ABDOMEN PELVIS WITH CONTRAST CLINICAL HISTORY: Sepsis;Abdominal pain, acute, nonlocalized; TECHNIQUE: Low dose axial images, sagittal and coronal reformations were obtained from the lung bases to the pubic symphysis following the IV administration of 75 mL of Omnipaque 350 .  Oral contrast was not given. COMPARISON: 08/01/2022 FINDINGS: Images of the lower thorax are remarkable for bilateral dependent atelectasis.  There is bilateral interlobular septal thickening and fibrotic change primarily involving the  periphery of the bilateral lower lobes.  There is a calcified granuloma within the right lower lobe.  There are a few scattered tree-in-bud type nodules within the periphery of the right lower lobe, possibly new since the previous exam although pleural effusion on previous exam limits direct comparison.  There is a trace right pleural effusion, no significant left pleural effusion pacer wires noted. The liver is hypoattenuating, may reflect contrast phase however correlation with LFTs is advised as findings can be seen with steatosis.  The spleen, pancreas and right adrenal gland are unremarkable.  There is nodular thickening of the left adrenal gland.  The gallbladder is surgically absent.  No significant biliary dilation status post cholecystectomy.  There is a small hiatal hernia.  The stomach is nondistended, no significant gastric wall thickening.  The portal vein, splenic vein, SMV, celiac axis and SMA all are patent.  No significant abdominal lymphadenopathy. The kidneys enhance symmetrically without hydronephrosis or nephrolithiasis.  There are subcentimeter low attenuating lesions within the interpolar region of the left kidney, too small for characterization.  The bilateral ureters are unable to be followed in their entirety to the urinary bladder, no definite calculi seen or secondary findings to suggest obstructive uropathy.  The urinary bladder is relatively decompressed.  Motion artifact limits evaluation of the deep pelvis.  There is indistinctness about the uterus.  There is a small amount of fluid in the pelvis. Surgical changes are noted of distal colectomy.  The rectal stump appears intact.  There is moderate to large amount of stool within the residual large bowel.  The appendix and terminal ileum are unremarkable.  The ostomy site within the left anterior abdominal wall appears intact.  The small bowel is grossly unremarkable.  No focal organized pelvic fluid collection.  There is prominent  atherosclerotic plaque and calcification of the aorta and its branches. Degenerative changes are noted of the bilateral sacroiliac joints, femoroacetabular joints, and spine.  There is osteopenia.  No significant inguinal lymphadenopathy. Surgical changes are noted of the anterior abdominal wall.  There is rim enhancing fluid within the anterior abdominal wall incision, just cranial to the umbilicus and appears new since the previous examination.  The collection is difficult to measure given its lobular nature however measures approximately 2.2 x 1.1 x 1.4 cm.  Caudal to the umbilicus, an additional fluid collection is identified, appears more organized than on the previous examination and measures approximately 2.4 x 1.2 x 3.2 cm.  Caudal to this collection, an additional collection is noted, possibly contiguous with the above measuring approximately 1.1 x 1.1 cm peer there is heterogeneous air at the level of the umbilicus, likely reflecting air related to packing material.  Please note, the transverse colon abuts this region however no convincing fistula, correlation however is advised.  There are scattered right gluteal calcifications suggesting injection granuloma.  In comparison to the previous examination, there has been possible interval incision and drainage involving the posterior body wall posterior to the sacrum.  Induration remains in the region without focal organized collection.     This report was flagged in Epic as abnormal. 1. Rim enhancing collections along the anterior abdominal incision, collections cranial to the umbilicus appear new since the previous exam.  Collections inferior to the umbilicus please note, the transverse colon closely abuts these regions, no direct fistulous communication is identified although correlation is recommended. 2. Moderate stool within the residual colon upstream of the colostomy.  No findings to suggest high-grade obstruction however correlation with ostomy  output advised. 3. Possible hepatic steatosis, correlation with LFTs recommended. 4. Improved pleural effusions bilaterally. 5. Scattered tree-in-bud type nodules within the right lower lobe, developing infectious or inflammatory process not excluded.  Follow-up is advised. 6. Please see above for several additional findings. Electronically signed by: Bret Kamara MD Date:    08/18/2022 Time:    12:26    X-Ray Chest AP Portable    Result Date: 8/26/2022  EXAMINATION: XR CHEST AP PORTABLE CLINICAL HISTORY: Shortness of breath TECHNIQUE: Single frontal view of the chest was performed. COMPARISON: 08/01/2021 FINDINGS: Stably positioned left-sided cardiac pacing device in place.  There is unchanged enlargement of the cardiomediastinal silhouette.  Mediastinal structures are midline.  The lungs are symmetrically expanded with diffuse increased interstitial and parenchymal attenuation and a few patchy bibasilar opacities.  Findings can be seen in the setting of pulmonary edema/CHF although correlation for infectious process advised.  No substantial volume of pleural fluid or pneumothorax identified.  Osseous structures are intact.     Cardiomegaly and findings suggestive of pulmonary edema/CHF although correlation for infectious process advised. Electronically signed by: Lucy Tovar MD Date:    08/26/2022 Time:    03:21    Echo    Result Date: 8/4/2022  · The left ventricle is moderately enlarged with eccentric hypertrophy and severely decreased systolic function. · The estimated ejection fraction is 10%. · Grade II left ventricular diastolic dysfunction. · Moderate left atrial enlargement. · Moderate mitral regurgitation. · No obvious endocarditis · Normal right ventricular size with low normal right ventricular systolic function. · Mild to moderate tricuspid regurgitation. · Mild aortic regurgitation. · There are segmental left ventricular wall motion abnormalities. Akinetic apex. No thrombus.          Current  Medications:     Infusions:       Scheduled:   aspirin  81 mg Oral Daily    carvediloL  3.125 mg Oral BID    clopidogreL  75 mg Oral Daily    docusate sodium  100 mg Oral BID    enoxaparin  40 mg Subcutaneous Daily    ezetimibe  10 mg Oral QHS    [START ON 9/3/2022] furosemide  40 mg Oral Daily    pantoprazole  40 mg Oral Daily    tolvaptan  30 mg Oral Every Mon, Wed, Fri        PRN:  ALPRAZolam, ondansetron, simethicone, sodium chloride 0.9%, traMADoL     Assessment and Plan:     Odette Hart is a 66 y.o.female with  Coronary Artery Disease, stable  Continue aspirin 81 mg and clopidogrel 75 mg daily    Heart Failure, Systolic & Diastolic, Acute on Chronic  Continue current carvedilol 3.125 mg twice daily and furosemide 40mg qd.  BP is too low for ARB at present    HLP  On zetia    ICD in place         Tony Wade MD        Disclaimer: This document was created using voice recognition software (M*Modal Fluency Direct). Although it may be edited, this document may contain errors related to incorrect recognition of the spoken word. Please call the physician if clarification is needed.

## 2022-09-02 NOTE — PROGRESS NOTES
[facility] Nephrology Progress Note    Date of Admit: 2022    Chief Complaint/Reason for Consult     Shortness of Breath (Pt c/o difficulty breathing with associated weakness and nausea.  Pmh of CHF)    Reason for consult: Hyponatremia    Subjective:      History of Present Illness:  Odette Hart is a 66 y.o. female who  presented to [facility] on 22 with worsening shortness of breath. Has history of CHFrEF and has had multiple hospitalizations for dyspnea. BNP > 3k on presentation and pulmonary edema noted on CXR. EF of 10% on last Echo. Patient diuresed with IV Lasix but noted to have worsening hyponatremia prompting nephrology eval.    Interval history:       Despite adequate diuresis pt remains volume overloaded.  +LI/feet edema/rales.  See below.       Review of Systems:  Pertinent positives and negatives are listed in HPI.  All other systems are reviewed and are negative.     Objective:   Last 24 Hour Vital Signs:  BP  Min: 87/49  Max: 114/51  Temp  Av.3 °F (36.8 °C)  Min: 98.1 °F (36.7 °C)  Max: 98.5 °F (36.9 °C)  Pulse  Av.5  Min: 66  Max: 163  Resp  Av.4  Min: 16  Max: 20  SpO2  Av %  Min: 93 %  Max: 99 %  Body mass index is 20.97 kg/m².  I/O last 3 completed shifts:  In: 700 [P.O.:700]  Out: 2750 [Urine:2500; Stool:250]    Physical Examination:  Gen: NAD, AAOx3  HEENT: NCAT, EOMI, PERRL, MM, pink conjunctiva  Neck: no thyroidmegaly, no LAD  Cardio: RRR, normal S1/S2, no MRG, JVP wnl  Lungs:  diminished.    Abd: soft non tender, non distended, normal bowel sounds, no hepatosplenomegaly, +colostomy.   Ext: 2+ pulses B/L, no clubbing, cyanosis, 1+ feet edema  Skin: warm, dry, no rashes noted  Neuro: CN 2-12 grossly intact, UE/LE motor 5/5, sensation intact  Psych: conversational, responsive      Laboratory:  Most Recent Data:    Recent Labs   Lab 09/02/22  0611   *   K 4.7   CL 91*   CO2 27   BUN 13   CREATININE 0.7   CALCIUM 8.3*              Radiology:  X-Ray Chest 1 View   Final Result      Slight improvement of aeration of the lungs bilaterally         Electronically signed by: Asael Ortiz   Date:    09/01/2022   Time:    11:27      X-Ray Chest AP Portable   Final Result      Cardiomegaly and findings suggestive of pulmonary edema/CHF although correlation for infectious process advised.         Electronically signed by: Lucy Tovar MD   Date:    08/26/2022   Time:    03:21           Assessment and Plan:      Odette Hart is a 66 y.o.female    Hyponatremia-hypervolemic  - does have history of chronic hyponatremia with baseline around 129  - On 8/27 noted to have worsening level to 125. Held Lasix and repeated NA level and was up to 131 but suspect labs error and was stagnant at 127  - dosed samsca X1 for persistent <NA despite water restriction/lasix with initial improvement and dropped again.  -redosed samsca with great response (3200 cc) and   -difficult to gauge outpt treatment regimen.  -consider samsca 2-3 weekly vs lasix/danielito and monitoring of BMP(dropped with later).  -Na down to 127 again so will start Samsca 3 weekly, hold danielito, but continue lasix and increase to 40.       Acute on chronic systolic heart failure  - severely reduced EF of 10% earlier this month  - Cardio following  - BP likely too low to tolerate ACE/ARB but defer to Cards  - see below.    Hypotension  - Remains on Coreg  -as above  -remains low and added low dose midodrine but will hold and defer hypotension to Dr. Cordova.  -place teds    Normocytic Anemia:  -RIGO noted.  -dosed venofer.    End stage HF as above:  -appropriate for home based palliative care and pt now open to it.  -consult placed.       See above

## 2022-09-02 NOTE — ASSESSMENT & PLAN NOTE
Chronic with recent baseline of 129 and likely from Cardiorenal in origin.  Received Tolvaptan x 2 and now starting biw.  Followed by Nephrology.  Na stable at 129 today from 127 yesterday    Plan:  Follow up with Nephrology recommendations - Tolvaptan increased to 30mg three times weekly  Follow BMP

## 2022-09-03 NOTE — ASSESSMENT & PLAN NOTE
Chronic with recent baseline of 129 and likely from Cardiorenal in origin.  Received Tolvaptan x 2 and now starting biw.  Followed by Nephrology.  Na stable at 132 today from 129 yesterday    Plan:  Follow up with Nephrology recommendations - Tolvaptan increased to 30mg three times weekly  Follow BMP

## 2022-09-03 NOTE — CARE UPDATE
"Patient had a fall this afternoon - she was using her walker to go to the bathroom (did not notify nursing).  She thinks the walker "stuck" on something and she fell.  She hit the back of her head.  No significant pain.  No LOC.  Alert and oriented.  No neurologic deficits.  Small posterior scalp induration without skin tear.    -[facility] wo now  -Discussed with patient about need to call for assistance with any further ambulation  "

## 2022-09-03 NOTE — PROGRESS NOTES
"    Cardiology Progress Note    9/3/2022  11:29 AM    Subjective/Interim:      Just came back from a walk.  Complains of low back pain. BP was low (82/44) overnight and 95/52 this AM.     Objective:   24-hour Vitals:  Temp:  [97.8 °F (36.6 °C)-99 °F (37.2 °C)] 98.5 °F (36.9 °C)  Pulse:  [69-88] 78  Resp:  [12-20] 20  SpO2:  [93 %-99 %] 97 %  BP: ()/(44-62) 95/52    Physical Examination:  Vitals: BP (!) 95/52 (BP Location: Right arm, Patient Position: Lying)   Pulse 78   Temp 98.5 °F (36.9 °C) (Oral)   Resp 20   Ht 5' 3" (1.6 m)   Wt 53.7 kg (118 lb 6.2 oz)   LMP  (LMP Unknown)   SpO2 97%   Breastfeeding No   BMI 20.97 kg/m²     Physical Exam  Constitutional:       General: She is not in acute distress.     Appearance: Normal appearance. She is well-developed. She is not ill-appearing or toxic-appearing.   Eyes:      Conjunctiva/sclera:      Right eye: Right conjunctiva is not injected. No hemorrhage.     Left eye: Left conjunctiva is not injected. No hemorrhage.  Neck:      Vascular: No JVD.   Cardiovascular:      Rate and Rhythm: Normal rate and regular rhythm.      Heart sounds: S1 normal and S2 normal. Murmur heard.   Systolic murmur is present with a grade of 2/6 at the lower left sternal border.     Gallop present. S3 sounds present.      Comments: ICD left chest wall.  Pulmonary:      Effort: Pulmonary effort is normal.      Breath sounds: No rales.   Chest:      Chest wall: No swelling or tenderness.   Abdominal:      Tenderness: There is no abdominal tenderness.      Comments: Colostomy.   Musculoskeletal:      Right ankle: No swelling.      Left ankle: No swelling.   Skin:     General: Skin is warm and dry.      Findings: No rash.   Neurological:      General: No focal deficit present.      Mental Status: She is alert and oriented to person, place, and time. She is not disoriented.   Psychiatric:         Attention and Perception: Attention normal.         Mood and Affect: Mood normal.         " Speech: Speech normal.         Behavior: Behavior normal.         Thought Content: Thought content normal.         Cognition and Memory: Cognition and memory normal.         Judgment: Judgment normal.         Intake/Output Summary (Last 24 hours) at 9/3/2022 1129  Last data filed at 9/3/2022 0700  Gross per 24 hour   Intake 600 ml   Output 1150 ml   Net -550 ml         Laboratory:  Trended Lab Data:  No results for input(s): WBC, HGB, HCT, PLT in the last 168 hours.    Recent Labs   Lab 08/30/22  0937 08/31/22  0523 09/01/22  0515 09/02/22  0611 09/03/22  0456   NA  --    < > 127* 129* 132*   K  --    < > 4.5 4.7 4.3   CL  --    < > 92* 91* 94*   CO2  --    < > 25 27 28   BUN  --    < > 12 13 12   GLU  --    < > 131* 88 120*   CALCIUM  --    < > 8.2* 8.3* 8.2*   MG 1.8  --   --   --   --     < > = values in this interval not displayed.         Recent Labs   Lab 08/29/22  0517   PROT 6.7   ALBUMIN 1.9*   BILITOT 1.1*   AST 43*   ALT 34   ALKPHOS 108         No results for input(s): PROTIME, PTT, INR in the last 168 hours.    Cardiac:   Recent Labs   Lab 08/30/22  0551 09/01/22  0110   TROPONINI  --  0.017   BNP 3,420*  --          FLP:   Lab Results   Component Value Date    CHOL 106 (L) 08/30/2022    HDL 13 (L) 08/30/2022    LDLCALC 70.6 08/30/2022    TRIG 112 08/30/2022    CHOLHDL 12.3 (L) 08/30/2022     DM:   Lab Results   Component Value Date    HGBA1C 5.0 08/26/2022    HGBA1C 4.9 06/08/2022    HGBA1C 5.0 05/24/2022    LDLCALC 70.6 08/30/2022    CREATININE 0.7 09/03/2022     Thyroid:   Lab Results   Component Value Date    TSH 1.682 08/09/2022    FREET4 1.05 10/02/2019     Anemia:   Lab Results   Component Value Date    IRON 22 (L) 08/29/2022    TIBC 280 08/29/2022    FERRITIN 438 (H) 08/29/2022    JILBCCAM23 1452 (H) 08/29/2022    FOLATE 12.1 08/29/2022     Urinalysis:   Lab Results   Component Value Date    LABURIN ENTEROBACTER CLOACAE  10,000 - 49,999 cfu/ml   (A) 08/01/2022    LABURIN (A) 08/01/2022      KLEBSIELLA OXYTOCA  10,000 - 49,999 cfu/ml  No other significant isolate      COLORU Yellow 08/28/2022    CLARITYU Cloudy 12/16/2021    SPECGRAV 1.010 08/28/2022    NITRITE Negative 08/28/2022    KETONESU Negative 08/28/2022    UROBILINOGEN Negative 08/28/2022     @      Other Results:  EKG (my interpretation):    TELEMETRY:  Sinus rhythm rate 70-80    Echo:   Results for orders placed or performed during the hospital encounter of 08/01/22   Echo   Result Value Ref Range    Ascending aorta 2.82 cm    STJ 2.71 cm    AV mean gradient 4 mmHg    Ao peak thad 1.36 m/s    Ao VTI 21.57 cm    IVS 0.79 0.6 - 1.1 cm    LA size 5.19 cm    Left Atrium Major Axis 5.95 cm    Left Atrium Minor Axis 5.93 cm    LVIDd 6.26 (A) 3.5 - 6.0 cm    LVIDs 5.85 (A) 2.1 - 4.0 cm    LVOT diameter 1.76 cm    LVOT peak VTI 14.62 cm    Posterior Wall 0.66 0.6 - 1.1 cm    MV Peak A Thad 0.62 m/s    E wave deceleration time 114.74 msec    MV Peak E Thad 0.82 m/s    RA Major Axis 4.63 cm    RA Width 3.11 cm    RVDD 3.51 cm    Sinus 2.60 cm    TAPSE 1.40 cm    TR Max Thad 3.20 m/s    TDI LATERAL 0.03 m/s    TDI SEPTAL 0.05 m/s    LA WIDTH 4.35 cm    MV stenosis pressure 1/2 time 33.28 ms    LV Diastolic Volume 198.02 mL    LV Systolic Volume 169.54 mL    RV S' 8.58 cm/s    LVOT peak thad 1.09 m/s    LA volume (mod) 71.41 cm3    LV LATERAL E/E' RATIO 27.33 m/s    LV SEPTAL E/E' RATIO 16.40 m/s    FS 7 %    LA volume 113.99 cm3    LV mass 177.82 g    Left Ventricle Relative Wall Thickness 0.21 cm    AV valve area 1.65 cm2    AV Velocity Ratio 0.80     AV index (prosthetic) 0.68     MV valve area p 1/2 method 6.61 cm2    E/A ratio 1.32     Mean e' 0.04 m/s    LVOT area 2.4 cm2    LVOT stroke volume 35.55 cm3    AV peak gradient 7 mmHg    E/E' ratio 20.50 m/s    Triscuspid Valve Regurgitation Peak Gradient 41 mmHg    BSA 1.54 m2    LV Systolic Volume Index 109.4 mL/m2    LV Diastolic Volume Index 127.75 mL/m2    LA Volume Index 73.5 mL/m2    LV Mass Index 115  g/m2    LA Volume Index (Mod) 46.1 mL/m2    EF 10 %    Narrative    · The left ventricle is moderately enlarged with eccentric hypertrophy and   severely decreased systolic function.  · The estimated ejection fraction is 10%.  · Grade II left ventricular diastolic dysfunction.  · Moderate left atrial enlargement.  · Moderate mitral regurgitation.  · No obvious endocarditis  · Normal right ventricular size with low normal right ventricular systolic   function.  · Mild to moderate tricuspid regurgitation.  · Mild aortic regurgitation.  · There are segmental left ventricular wall motion abnormalities. Akinetic   apex. No thrombus.          Radiology:  X-Ray Chest 1 View    Result Date: 9/1/2022  EXAMINATION: XR CHEST 1 VIEW CLINICAL HISTORY: CHF; TECHNIQUE: Single frontal view of the chest was performed. COMPARISON: Chest radiograph 08/26/2022 FINDINGS: Pacing device is noted in the left chest, with transvenous leads remaining in stable position. Unchanged enlargement of the cardiomediastinal silhouette.  Lungs appear symmetrically expanded, with slight improvement of interstitial and parenchymal attenuation, as well as patchy bibasilar opacities. Osseous structures appear intact.     Slight improvement of aeration of the lungs bilaterally Electronically signed by: Asael Ortiz Date:    09/01/2022 Time:    11:27    CTA Chest Non Coronary    Result Date: 8/3/2022  EXAMINATION: CTA CHEST NON CORONARY CLINICAL HISTORY: Pulmonary embolism (PE) suspected, positive D-dimer;SOB, elevated d dimer; TECHNIQUE: Low dose axial images, sagittal and coronal reformations were obtained from the thoracic inlet to the lung bases following the IV administration of 75 mL of Omnipaque 350.  Contrast timing was optimized to evaluate the pulmonary arteries.  Maximum intensity projection images were provided for review. COMPARISON: Chest radiograph 08/01/2022.  CT abdomen pelvis 08/01/2022.  Nuclear medicine lung ventilation scan  05/18/2022. FINDINGS: Pulmonary vasculature: Satisfactory opacification of the pulmonary arterial system with no filling defect to the segmental level. Aorta: Left-sided aortic arch.  No aneurysm.  Atherosclerosis of the aortic arch, coronary arteries, and descending aorta. Base of Neck: No significant abnormality. Thoracic soft tissues: Left-sided cardiac pacer device with cardiac leads are noted. Heart: Mild-moderate enlargement of the left atrium..  No pericardial effusion.  There is reflux of contrast into the hepatic veins consistent with right heart failure versus tricuspid valvular disease. Nafisa/Mediastinum: No pathologic adam enlargement. Airways: The large airways are patent. No foci of endobronchial filling. Lungs/Pleura: Scattered ground-glass and consolidative opacities in the bilateral lower lobes, right greater than left.  Mild, smooth interlobular septal thickening.  Small-moderate right and trace-small left pleural effusion.  Dependent atelectasis in the bilateral lungs. Esophagus: Unremarkable Upper Abdomen: No abnormality of the partially imaged upper abdomen. Bones: No acute fracture. No suspicious lytic or sclerotic lesions.     No evidence of pulmonary thromboembolism to the subsegmental branches. Pulmonary edema with small right greater than left pleural effusions. Findings consistent with right heart failure and/or tricuspid valvular disease. Electronically signed by resident: Marbella Monaco Date:    08/03/2022 Time:    10:56 Electronically signed by: Nikki Valencia Date:    08/03/2022 Time:    11:42    CT Abdomen Pelvis With Contrast    Result Date: 8/18/2022  EXAMINATION: CT ABDOMEN PELVIS WITH CONTRAST CLINICAL HISTORY: Sepsis;Abdominal pain, acute, nonlocalized; TECHNIQUE: Low dose axial images, sagittal and coronal reformations were obtained from the lung bases to the pubic symphysis following the IV administration of 75 mL of Omnipaque 350 .  Oral contrast was not given. COMPARISON:  08/01/2022 FINDINGS: Images of the lower thorax are remarkable for bilateral dependent atelectasis.  There is bilateral interlobular septal thickening and fibrotic change primarily involving the periphery of the bilateral lower lobes.  There is a calcified granuloma within the right lower lobe.  There are a few scattered tree-in-bud type nodules within the periphery of the right lower lobe, possibly new since the previous exam although pleural effusion on previous exam limits direct comparison.  There is a trace right pleural effusion, no significant left pleural effusion pacer wires noted. The liver is hypoattenuating, may reflect contrast phase however correlation with LFTs is advised as findings can be seen with steatosis.  The spleen, pancreas and right adrenal gland are unremarkable.  There is nodular thickening of the left adrenal gland.  The gallbladder is surgically absent.  No significant biliary dilation status post cholecystectomy.  There is a small hiatal hernia.  The stomach is nondistended, no significant gastric wall thickening.  The portal vein, splenic vein, SMV, celiac axis and SMA all are patent.  No significant abdominal lymphadenopathy. The kidneys enhance symmetrically without hydronephrosis or nephrolithiasis.  There are subcentimeter low attenuating lesions within the interpolar region of the left kidney, too small for characterization.  The bilateral ureters are unable to be followed in their entirety to the urinary bladder, no definite calculi seen or secondary findings to suggest obstructive uropathy.  The urinary bladder is relatively decompressed.  Motion artifact limits evaluation of the deep pelvis.  There is indistinctness about the uterus.  There is a small amount of fluid in the pelvis. Surgical changes are noted of distal colectomy.  The rectal stump appears intact.  There is moderate to large amount of stool within the residual large bowel.  The appendix and terminal ileum are  unremarkable.  The ostomy site within the left anterior abdominal wall appears intact.  The small bowel is grossly unremarkable.  No focal organized pelvic fluid collection.  There is prominent atherosclerotic plaque and calcification of the aorta and its branches. Degenerative changes are noted of the bilateral sacroiliac joints, femoroacetabular joints, and spine.  There is osteopenia.  No significant inguinal lymphadenopathy. Surgical changes are noted of the anterior abdominal wall.  There is rim enhancing fluid within the anterior abdominal wall incision, just cranial to the umbilicus and appears new since the previous examination.  The collection is difficult to measure given its lobular nature however measures approximately 2.2 x 1.1 x 1.4 cm.  Caudal to the umbilicus, an additional fluid collection is identified, appears more organized than on the previous examination and measures approximately 2.4 x 1.2 x 3.2 cm.  Caudal to this collection, an additional collection is noted, possibly contiguous with the above measuring approximately 1.1 x 1.1 cm peer there is heterogeneous air at the level of the umbilicus, likely reflecting air related to packing material.  Please note, the transverse colon abuts this region however no convincing fistula, correlation however is advised.  There are scattered right gluteal calcifications suggesting injection granuloma.  In comparison to the previous examination, there has been possible interval incision and drainage involving the posterior body wall posterior to the sacrum.  Induration remains in the region without focal organized collection.     This report was flagged in Epic as abnormal. 1. Rim enhancing collections along the anterior abdominal incision, collections cranial to the umbilicus appear new since the previous exam.  Collections inferior to the umbilicus please note, the transverse colon closely abuts these regions, no direct fistulous communication is  identified although correlation is recommended. 2. Moderate stool within the residual colon upstream of the colostomy.  No findings to suggest high-grade obstruction however correlation with ostomy output advised. 3. Possible hepatic steatosis, correlation with LFTs recommended. 4. Improved pleural effusions bilaterally. 5. Scattered tree-in-bud type nodules within the right lower lobe, developing infectious or inflammatory process not excluded.  Follow-up is advised. 6. Please see above for several additional findings. Electronically signed by: Bret Kamara MD Date:    08/18/2022 Time:    12:26    X-Ray Chest AP Portable    Result Date: 8/26/2022  EXAMINATION: XR CHEST AP PORTABLE CLINICAL HISTORY: Shortness of breath TECHNIQUE: Single frontal view of the chest was performed. COMPARISON: 08/01/2021 FINDINGS: Stably positioned left-sided cardiac pacing device in place.  There is unchanged enlargement of the cardiomediastinal silhouette.  Mediastinal structures are midline.  The lungs are symmetrically expanded with diffuse increased interstitial and parenchymal attenuation and a few patchy bibasilar opacities.  Findings can be seen in the setting of pulmonary edema/CHF although correlation for infectious process advised.  No substantial volume of pleural fluid or pneumothorax identified.  Osseous structures are intact.     Cardiomegaly and findings suggestive of pulmonary edema/CHF although correlation for infectious process advised. Electronically signed by: Lucy Tovar MD Date:    08/26/2022 Time:    03:21    Echo    Result Date: 8/4/2022  · The left ventricle is moderately enlarged with eccentric hypertrophy and severely decreased systolic function. · The estimated ejection fraction is 10%. · Grade II left ventricular diastolic dysfunction. · Moderate left atrial enlargement. · Moderate mitral regurgitation. · No obvious endocarditis · Normal right ventricular size with low normal right ventricular systolic  function. · Mild to moderate tricuspid regurgitation. · Mild aortic regurgitation. · There are segmental left ventricular wall motion abnormalities. Akinetic apex. No thrombus.          Current Medications:     Infusions:       Scheduled:   aspirin  81 mg Oral Daily    carvediloL  3.125 mg Oral BID    clopidogreL  75 mg Oral Daily    docusate sodium  100 mg Oral BID    enoxaparin  40 mg Subcutaneous Daily    ezetimibe  10 mg Oral QHS    furosemide  40 mg Oral Daily    pantoprazole  40 mg Oral Daily    tolvaptan  30 mg Oral Every Mon, Wed, Fri        PRN:  ALPRAZolam, ondansetron, simethicone, sodium chloride 0.9%, traMADoL     Assessment and Plan:     Odette Hart is a 66 y.o.female with  Coronary Artery Disease, stable  Continue aspirin 81 mg and clopidogrel 75 mg daily    Heart Failure, Systolic & Diastolic, Acute on Chronic  Continue furosemide 40mg qd.  BP is too low for ARB at present  Stop carvedilol 3.125mg due to hypotension.    HLP  On zetia    ICD in place         Tony Wade MD        Disclaimer: This document was created using voice recognition software ([employer] Fluency Direct). Although it may be edited, this document may contain errors related to incorrect recognition of the spoken word. Please call the physician if clarification is needed.

## 2022-09-03 NOTE — NURSING
Report gave to oncoming nurse, pt transferred to room 320 via WC. No distress or complaints voiced.

## 2022-09-03 NOTE — NURSING
pt found on floor by staff , pt stated I did not call for help to bathroom,  and she said walker fell and she fell trying to catch her walker, has a small skin tear to right elbow, I applied a foam dressing , pt stated she hit her head no injury noted no change in LOC noted ,MD notified . Pt assisted up x2 staff member, pt ambulated to bathroom with walker without any difficulty, pt voided and assisted back to bed. Pt reports no pain at this time.

## 2022-09-03 NOTE — NURSING
Pt back to room from Ct, no complaints voices no changes in LOC noted pt positioned in bed CB in reach, bed low instructed pt to call for assistance before getting up she voiced understanding.

## 2022-09-03 NOTE — SUBJECTIVE & OBJECTIVE
Interval History: Patient is awake and alert this morning.  Feels her breathing has overall and now sating well on room air.  No adverse events reported overnight.  Na improving.    Review of Systems   Constitutional:  Negative for chills and fever.   HENT:  Negative for ear pain.    Eyes:  Negative for pain.   Respiratory:  Positive for shortness of breath (improving). Negative for cough and wheezing.    Cardiovascular:  Negative for chest pain, palpitations and leg swelling.   Gastrointestinal:  Negative for abdominal pain, constipation, diarrhea, nausea and vomiting.   Genitourinary:  Negative for difficulty urinating and dysuria.   Musculoskeletal:  Negative for arthralgias and gait problem.   Neurological:  Positive for weakness. Negative for dizziness and headaches.   Psychiatric/Behavioral:  Negative for agitation and confusion.    Objective:     Vital Signs (Most Recent):  Temp: 98.5 °F (36.9 °C) (09/03/22 0747)  Pulse: 76 (09/03/22 0807)  Resp: 20 (09/03/22 0747)  BP: (!) 95/52 (09/03/22 0747)  SpO2: 97 % (09/03/22 0807)   Vital Signs (24h Range):  Temp:  [97.8 °F (36.6 °C)-99 °F (37.2 °C)] 98.5 °F (36.9 °C)  Pulse:  [67-88] 76  Resp:  [12-20] 20  SpO2:  [93 %-99 %] 97 %  BP: ()/(44-62) 95/52     Weight: 53.7 kg (118 lb 6.2 oz)  Body mass index is 20.97 kg/m².    Intake/Output Summary (Last 24 hours) at 9/3/2022 0927  Last data filed at 9/3/2022 0700  Gross per 24 hour   Intake 720 ml   Output 1450 ml   Net -730 ml        Physical Exam  Vitals and nursing note reviewed.   Constitutional:       General: She is not in acute distress.     Appearance: Normal appearance. She is ill-appearing (chronically).   HENT:      Head: Normocephalic and atraumatic.      Right Ear: External ear normal.      Left Ear: External ear normal.      Nose: Nose normal.      Mouth/Throat:      Mouth: Mucous membranes are moist.      Pharynx: Oropharynx is clear.   Eyes:      General: No scleral icterus.     Pupils: Pupils are  equal, round, and reactive to light.   Cardiovascular:      Rate and Rhythm: Normal rate and regular rhythm.   Pulmonary:      Effort: Pulmonary effort is normal. No respiratory distress.      Breath sounds: No wheezing or rales.   Abdominal:      General: Abdomen is flat. There is no distension.      Palpations: Abdomen is soft.      Tenderness: There is no abdominal tenderness.      Comments: +colostomy   Musculoskeletal:      Cervical back: Normal range of motion and neck supple.      Right lower leg: No edema.      Left lower leg: No edema.   Skin:     General: Skin is warm and dry.   Neurological:      General: No focal deficit present.      Mental Status: She is alert and oriented to person, place, and time. Mental status is at baseline.   Psychiatric:         Mood and Affect: Mood normal.         Behavior: Behavior normal.         Thought Content: Thought content normal.       Significant Labs: All pertinent labs within the past 24 hours have been reviewed.    Significant Imaging: I have reviewed all pertinent imaging results/findings within the past 24 hours.

## 2022-09-03 NOTE — PROGRESS NOTES
"[facility] Medicine  Progress Note    Patient Name: Odette Hart  MRN: [id number]  Patient Class: IP- Inpatient   Admission Date: 8/26/2022  Length of Stay: 8 days  Attending Physician: Marco Antonio Alvarado MD  Primary Care Provider: Braxton Barragan MD        Subjective:     Principal Problem:Acute combined systolic and diastolic congestive heart failure        HPI:  Per Michael Long, NP:  "The patient is a 66 y.o. female with a past medical history of MI, HLD, CAD, cardiomyopathy, HTN, and CHF who presents for evaluation of shortness of breath onset earlier today. She reports "I've been feeling bad," endorsing associated symptoms of nausea and weakness. She states she has been near vomiting but denies any vomiting episodes. She reports elevated temperature at 99 F last night. She states her current presentations feel similar to her previous hospitalizations for dyspnea. Pt is regularly followed by cardiology. She denies pain elsewhere and other somatic complaints.  BNP elevated to greater than 3000 with pulmonary edema noted on CXR.  She will be admitted for further management of her acute on chronic systolic congestive heart failure."      Overview/Hospital Course:  Odette Hart was admitted for CHF exacerbation. Diuresing with IVP lasix, cardiology following along. During stay her chronic hyponatremia worsened, nephrology consulted. Holding lasix, giving tolvaptan x 2 with improvement in sodium.       Given her advanced heart failure, palliative care consulted and recommending palliative based care at home (referral placed)      Interval History: Patient is awake and alert this morning.  Feels her breathing has overall and now sating well on room air.  No adverse events reported overnight.  Na improving.    Review of Systems   Constitutional:  Negative for chills and fever.   HENT:  Negative for ear pain.    Eyes:  Negative for pain.   Respiratory:  Positive for shortness of " breath (improving). Negative for cough and wheezing.    Cardiovascular:  Negative for chest pain, palpitations and leg swelling.   Gastrointestinal:  Negative for abdominal pain, constipation, diarrhea, nausea and vomiting.   Genitourinary:  Negative for difficulty urinating and dysuria.   Musculoskeletal:  Negative for arthralgias and gait problem.   Neurological:  Positive for weakness. Negative for dizziness and headaches.   Psychiatric/Behavioral:  Negative for agitation and confusion.    Objective:     Vital Signs (Most Recent):  Temp: 98.5 °F (36.9 °C) (09/03/22 0747)  Pulse: 76 (09/03/22 0807)  Resp: 20 (09/03/22 0747)  BP: (!) 95/52 (09/03/22 0747)  SpO2: 97 % (09/03/22 0807)   Vital Signs (24h Range):  Temp:  [97.8 °F (36.6 °C)-99 °F (37.2 °C)] 98.5 °F (36.9 °C)  Pulse:  [67-88] 76  Resp:  [12-20] 20  SpO2:  [93 %-99 %] 97 %  BP: ()/(44-62) 95/52     Weight: 53.7 kg (118 lb 6.2 oz)  Body mass index is 20.97 kg/m².    Intake/Output Summary (Last 24 hours) at 9/3/2022 0927  Last data filed at 9/3/2022 0700  Gross per 24 hour   Intake 720 ml   Output 1450 ml   Net -730 ml        Physical Exam  Vitals and nursing note reviewed.   Constitutional:       General: She is not in acute distress.     Appearance: Normal appearance. She is ill-appearing (chronically).   HENT:      Head: Normocephalic and atraumatic.      Right Ear: External ear normal.      Left Ear: External ear normal.      Nose: Nose normal.      Mouth/Throat:      Mouth: Mucous membranes are moist.      Pharynx: Oropharynx is clear.   Eyes:      General: No scleral icterus.     Pupils: Pupils are equal, round, and reactive to light.   Cardiovascular:      Rate and Rhythm: Normal rate and regular rhythm.   Pulmonary:      Effort: Pulmonary effort is normal. No respiratory distress.      Breath sounds: No wheezing or rales.   Abdominal:      General: Abdomen is flat. There is no distension.      Palpations: Abdomen is soft.      Tenderness:  There is no abdominal tenderness.      Comments: +colostomy   Musculoskeletal:      Cervical back: Normal range of motion and neck supple.      Right lower leg: No edema.      Left lower leg: No edema.   Skin:     General: Skin is warm and dry.   Neurological:      General: No focal deficit present.      Mental Status: She is alert and oriented to person, place, and time. Mental status is at baseline.   Psychiatric:         Mood and Affect: Mood normal.         Behavior: Behavior normal.         Thought Content: Thought content normal.       Significant Labs: All pertinent labs within the past 24 hours have been reviewed.    Significant Imaging: I have reviewed all pertinent imaging results/findings within the past 24 hours.      Assessment/Plan:      * Acute combined systolic and diastolic congestive heart failure  Patient with CAD s/p MI with known CM with Chronic systolic and diastolic CHF who presented with CHF.  Followed by Freddy Arenas MD at [facility].  BNP was 3698 and CXR with pulmonary edema on admission.  Repeat TTE on 6/4/2022 with EF down to 10% and Grade II DD with low normal RVSF.  Started on Lasix for diuresis, but course complicated with hypotension and hyponatremia.  Followed by Cardiology and Nephrology.  Urine output decent at 1350ml.  Sating well on RA this morning  Home Meds:  Carvedilol 3.125mg bid and Lasix 40mg  Hospital Meds:  Carvedilol 3.125mg bid, Lasix 40mg qday    Plan:  6 minute walk test  Follow up with Cardiology and Nephrology recommendations - continue diuresis.  Consider SGLT2 (nonformulary inpatient) on discharge.  Continue with current regimen  Continue with Fluid restrictions  Monitor I&Os        Hyponatremia  Chronic with recent baseline of 129 and likely from Cardiorenal in origin.  Received Tolvaptan x 2 and now starting biw.  Followed by Nephrology.  Na stable at 132 today from 129 yesterday    Plan:  Follow up with Nephrology recommendations - Tolvaptan increased to 30mg  three times weekly  Follow BMP           Encounter for palliative care  Followed by Palliative Care  Patient is Full Code      Debility  PT/OT consulted -  PT/OT on discharge      Primary hypertension  Home Meds:  Carvedilol 3.125mg bid and Lasix 40mg  SBP runs low outpatient.  Stable at 93/47 this morning  -Continue Carvedilol and Lasix   -Monitor and adjust as needed      Familial hypercholesterolemia  Has previous Statin intolerance.  -Continue Zetia      Coronary artery disease of native artery of native heart with stable angina pectoris  S/p MI and s/p stents  Home Meds:  ASA 81mg/Plavix 75mg, Carvedilol 3.125mg bid.  Has Statin intolerance.  -Continue Home Meds        VTE Risk Mitigation (From admission, onward)         Ordered     Place JESS hose  Until discontinued         09/02/22 0910     enoxaparin injection 40 mg  Daily         08/26/22 0325     IP VTE HIGH RISK PATIENT  Once         08/26/22 0325     Place sequential compression device  Until discontinued         08/26/22 0325                Discharge Planning   SIVAN:      Code Status: Full Code   Is the patient medically ready for discharge?:     Reason for patient still in hospital (select all that apply): Patient trending condition, Treatment and Consult recommendations  Discharge Plan A: Home with family                  Marco Antonio Alvarado MD  Department of Hospital Medicine   [facility] - Med Surg ([facility])

## 2022-09-03 NOTE — ASSESSMENT & PLAN NOTE
Patient with CAD s/p MI with known CM with Chronic systolic and diastolic CHF who presented with CHF.  Followed by Freddy Arenas MD at [facility].  BNP was 3698 and CXR with pulmonary edema on admission.  Repeat TTE on 6/4/2022 with EF down to 10% and Grade II DD with low normal RVSF.  Started on Lasix for diuresis, but course complicated with hypotension and hyponatremia.  Followed by Cardiology and Nephrology.  Urine output decent at 1350ml.  Sating well on RA this morning  Home Meds:  Carvedilol 3.125mg bid and Lasix 40mg  Hospital Meds:  Carvedilol 3.125mg bid, Lasix 40mg qday    Plan:  6 minute walk test  Follow up with Cardiology and Nephrology recommendations - continue diuresis.  Consider SGLT2 (nonformulary inpatient) on discharge.  Continue with current regimen  Continue with Fluid restrictions  Monitor I&Os

## 2022-09-03 NOTE — PROGRESS NOTES
[facility] Nephrology Progress Note    Date of Admit: 2022    Chief Complaint/Reason for Consult     Shortness of Breath (Pt c/o difficulty breathing with associated weakness and nausea.  Pmh of CHF)    Reason for consult: Hyponatremia    Subjective:      History of Present Illness:  Odette Hart is a 66 y.o. female who  presented to [facility] on 22 with worsening shortness of breath. Has history of CHFrEF and has had multiple hospitalizations for dyspnea. BNP > 3k on presentation and pulmonary edema noted on CXR. EF of 10% on last Echo. Patient diuresed with IV Lasix but noted to have worsening hyponatremia prompting nephrology eval.    Interval history:     GOOD UOP after [facility]      Review of Systems:  Pertinent positives and negatives are listed in HPI.  All other systems are reviewed and are negative.     Objective:   Last 24 Hour Vital Signs:  BP  Min: 82/44  Max: 110/61  Temp  Av.5 °F (36.9 °C)  Min: 97.8 °F (36.6 °C)  Max: 99 °F (37.2 °C)  Pulse  Av.4  Min: 69  Max: 88  Resp  Av.3  Min: 12  Max: 20  SpO2  Av.9 %  Min: 93 %  Max: 99 %  Body mass index is 20.97 kg/m².  I/O last 3 completed shifts:  In: 1080 [P.O.:1080]  Out: 2000 [Urine:2000]    Physical Examination:  Gen: NAD, AAOx3  HEENT: NCAT, EOMI, PERRL, MM, pink conjunctiva  Neck: no thyroidmegaly, no LAD  Cardio: RRR, normal S1/S2, no MRG, JVP wnl  Lungs:  diminished.    Abd: soft non tender, non distended, normal bowel sounds, no hepatosplenomegaly, +colostomy.   Ext: 2+ pulses B/L, no clubbing, cyanosis, 1+ feet edema  Skin: warm, dry, no rashes noted  Neuro: CN 2-12 grossly intact, UE/LE motor 5/5, sensation intact  Psych: conversational, responsive      Laboratory:  Most Recent Data:    Recent Labs   Lab 22  0456   *   K 4.3   CL 94*   CO2 28   BUN 12   CREATININE 0.7   CALCIUM 8.2*             Radiology:  X-Ray Chest 1 View   Final Result      Slight improvement of aeration of the lungs  bilaterally         Electronically signed by: Asael Ortiz   Date:    09/01/2022   Time:    11:27      X-Ray Chest AP Portable   Final Result      Cardiomegaly and findings suggestive of pulmonary edema/CHF although correlation for infectious process advised.         Electronically signed by: Lucy Tovar MD   Date:    08/26/2022   Time:    03:21           Assessment and Plan:      Odette Hart is a 66 y.o.female    Hyponatremia-hypervolemic  - does have history of chronic hyponatremia with baseline around 129  - On 8/27 noted to have worsening level to 125. Held Lasix and repeated NA level and was up to 131 but suspect labs error and was stagnant at 127  -failed trial of lasix/danielito  -Na improving on Samsca 3 times a week, hold danielito, but continue lasix and increase to 40.  Will need to get insurance approval of samsca for outpatient use prior to discharge.      Acute on chronic systolic heart failure  - severely reduced EF of 10% earlier this month  - Cardio following  - BP likely too low to tolerate ACE/ARB but defer to Cards  - see below.    Hypotension  - Remains on Coreg  -as above  -remains low and added low dose midodrine but will hold and defer hypotension to Dr. Cordova.  -place teds    Normocytic Anemia:  -RIGO noted.  -dosed venofer.    End stage HF as above:  -appropriate for home based palliative care and pt now open to it.  -consult placed.       See above

## 2022-09-04 NOTE — PROGRESS NOTES
"[facility] [place] Medicine  Progress Note    Patient Name: Odette Hart  MRN: [id number]  Patient Class: IP- Inpatient   Admission Date: 8/26/2022  Length of Stay: 9 days  Attending Physician: Marco Antonio Alvarado MD  Primary Care Provider: Braxton Barragan MD        Subjective:     Principal Problem:Acute combined systolic and diastolic congestive heart failure        HPI:  Per Michael Long, NP:  "The patient is a 66 y.o. female with a past medical history of MI, HLD, CAD, cardiomyopathy, HTN, and CHF who presents for evaluation of shortness of breath onset earlier today. She reports "I've been feeling bad," endorsing associated symptoms of nausea and weakness. She states she has been near vomiting but denies any vomiting episodes. She reports elevated temperature at 99 F last night. She states her current presentations feel similar to her previous hospitalizations for dyspnea. Pt is regularly followed by cardiology. She denies pain elsewhere and other somatic complaints.  BNP elevated to greater than 3000 with pulmonary edema noted on CXR.  She will be admitted for further management of her acute on chronic systolic congestive heart failure."      Overview/Hospital Course:  Odette Hart was admitted for CHF exacerbation. Diuresing with IVP lasix, cardiology following along. During stay her chronic hyponatremia worsened, nephrology consulted. Holding lasix, giving tolvaptan x 2 with improvement in sodium.       Given her advanced heart failure, palliative care consulted and recommending palliative based care at home (referral placed)      Interval History: Patient is awake and alert this morning.  No adverse events reported overnight.  Na improving/Stable.      Review of Systems   Constitutional:  Negative for chills and fever.   HENT:  Negative for ear pain.    Eyes:  Negative for pain.   Respiratory:  Positive for shortness of breath (improving). Negative for cough and wheezing.  "   Cardiovascular:  Negative for chest pain, palpitations and leg swelling.   Gastrointestinal:  Negative for abdominal pain, constipation, diarrhea, nausea and vomiting.   Genitourinary:  Negative for difficulty urinating and dysuria.   Musculoskeletal:  Negative for arthralgias and gait problem.   Neurological:  Positive for weakness. Negative for dizziness and headaches.   Psychiatric/Behavioral:  Negative for agitation and confusion.    Objective:     Vital Signs (Most Recent):  Temp: 98 °F (36.7 °C) (09/04/22 0948)  Pulse: 80 (09/04/22 1000)  Resp: 16 (09/04/22 0948)  BP: (!) 88/50 (09/04/22 0949)  SpO2: 97 % (09/04/22 0948)   Vital Signs (24h Range):  Temp:  [97.4 °F (36.3 °C)-98.9 °F (37.2 °C)] 98 °F (36.7 °C)  Pulse:  [70-99] 80  Resp:  [16-25] 16  SpO2:  [94 %-99 %] 97 %  BP: ()/(42-58) 88/50     Weight: 53.7 kg (118 lb 6.2 oz)  Body mass index is 20.97 kg/m².    Intake/Output Summary (Last 24 hours) at 9/4/2022 1101  Last data filed at 9/4/2022 0100  Gross per 24 hour   Intake 360 ml   Output 1675 ml   Net -1315 ml        Physical Exam  Vitals and nursing note reviewed.   Constitutional:       General: She is not in acute distress.     Appearance: Normal appearance. She is ill-appearing (chronically).   HENT:      Head: Normocephalic and atraumatic.      Right Ear: External ear normal.      Left Ear: External ear normal.      Nose: Nose normal.      Mouth/Throat:      Mouth: Mucous membranes are moist.      Pharynx: Oropharynx is clear.   Eyes:      General: No scleral icterus.     Pupils: Pupils are equal, round, and reactive to light.   Cardiovascular:      Rate and Rhythm: Normal rate and regular rhythm.   Pulmonary:      Effort: Pulmonary effort is normal. No respiratory distress.      Breath sounds: No wheezing or rales.   Abdominal:      General: Abdomen is flat. There is no distension.      Palpations: Abdomen is soft.      Tenderness: There is no abdominal tenderness.      Comments: +colostomy    Musculoskeletal:      Cervical back: Normal range of motion and neck supple.      Right lower leg: No edema.      Left lower leg: No edema.   Skin:     General: Skin is warm and dry.   Neurological:      General: No focal deficit present.      Mental Status: She is alert and oriented to person, place, and time. Mental status is at baseline.   Psychiatric:         Mood and Affect: Mood normal.         Behavior: Behavior normal.         Thought Content: Thought content normal.       Significant Labs: All pertinent labs within the past 24 hours have been reviewed.    Significant Imaging: I have reviewed all pertinent imaging results/findings within the past 24 hours.      Assessment/Plan:      * Acute combined systolic and diastolic congestive heart failure  Patient with CAD s/p MI with known CM with Chronic systolic and diastolic CHF who presented with CHF.  Followed by Freddy Arenas MD at [facility].  BNP was 3698 and CXR with pulmonary edema on admission.  Repeat TTE on 6/4/2022 with EF down to 10% and Grade II DD with low normal RVSF.  Started on Lasix for diuresis, but course complicated with hypotension and hyponatremia.  Followed by Cardiology and Nephrology.  Urine output decent at 1675ml.  Sating well on RA this morning - passed 6 minute walk test and will not require home O2 on discharge.    Home Meds:  Carvedilol 3.125mg bid and Lasix 40mg  Hospital Meds:  Carvedilol 3.125mg bid, Lasix 40mg qday    Plan:  Follow up with Cardiology and Nephrology recommendations - continue diuresis.  Consider SGLT2 (nonformulary inpatient) on discharge.  Carvedilol d/c on 9/3/2022 by Cardiology.  Continue with current regimen  Continue with Fluid restrictions  Monitor I&Os        Hyponatremia  Chronic with recent baseline of 129 and likely from Cardiorenal in origin.  Received Tolvaptan x 2 and now starting biw.  Followed by Nephrology.  Na stable at 130 today from 132 yesterday    Plan:  Follow up with Nephrology  recommendations - Tolvaptan increased to 30mg three times weekly  Follow BMP           Encounter for palliative care  Followed by Palliative Care  Patient is Full Code      Debility  Patient with fall on 9/3/2022 while using walking.  Hit the back of her head.  CTH negative.  PT/OT consulted -  PT/OT on discharge        Primary hypertension  Home Meds:  Carvedilol 3.125mg bid and Lasix 40mg  SBP runs low outpatient.  Stable at 93/47 this morning  -Continue Lasix as above  -Monitor and adjust as needed      Familial hypercholesterolemia  Has previous Statin intolerance.  -Continue Zetia      Coronary artery disease of native artery of native heart with stable angina pectoris  S/p MI and s/p stents  Home Meds:  ASA 81mg/Plavix 75mg, Carvedilol 3.125mg bid.  Has Statin intolerance.  Hospital Meds:  ASA 81mg/Plavix 75mg        VTE Risk Mitigation (From admission, onward)         Ordered     Place JESS hose  Until discontinued         09/02/22 0910     enoxaparin injection 40 mg  Daily         08/26/22 0325     IP VTE HIGH RISK PATIENT  Once         08/26/22 0325     Place sequential compression device  Until discontinued         08/26/22 0325                Discharge Planning   SIVAN:      Code Status: Full Code   Is the patient medically ready for discharge?:     Reason for patient still in hospital (select all that apply): Patient trending condition, Treatment and Consult recommendations  Discharge Plan A: Home with family                  Marco Antonio Alvarado MD  Department of Hospital Medicine   [facility] - Med Surg ([place])

## 2022-09-04 NOTE — PLAN OF CARE
POC reviewed. No significant events this shift. Cardiac monitor maintained. Stable on RA. Complaints of pain treated with PRN pain medication per MAR. Pt voids and shifts in bed with SBA. Bed low and locked, side rails up x3, call light within reach.    Problem: Adult Inpatient Plan of Care  Goal: Plan of Care Review  Outcome: Ongoing, Progressing  Goal: Patient-Specific Goal (Individualized)  Outcome: Ongoing, Progressing  Goal: Absence of Hospital-Acquired Illness or Injury  Outcome: Ongoing, Progressing  Goal: Optimal Comfort and Wellbeing  Outcome: Ongoing, Progressing  Goal: Readiness for Transition of Care  Outcome: Ongoing, Progressing     Problem: Impaired Wound Healing  Goal: Optimal Wound Healing  Outcome: Ongoing, Progressing     Problem: Skin Injury Risk Increased  Goal: Skin Health and Integrity  Outcome: Ongoing, Progressing     Problem: Fall Injury Risk  Goal: Absence of Fall and Fall-Related Injury  Outcome: Ongoing, Progressing     Problem: Coping Ineffective  Goal: Effective Coping  Outcome: Ongoing, Progressing

## 2022-09-04 NOTE — SUBJECTIVE & OBJECTIVE
Interval History: Patient is awake and alert this morning.  No adverse events reported overnight.  Na improving/Stable.      Review of Systems   Constitutional:  Negative for chills and fever.   HENT:  Negative for ear pain.    Eyes:  Negative for pain.   Respiratory:  Positive for shortness of breath (improving). Negative for cough and wheezing.    Cardiovascular:  Negative for chest pain, palpitations and leg swelling.   Gastrointestinal:  Negative for abdominal pain, constipation, diarrhea, nausea and vomiting.   Genitourinary:  Negative for difficulty urinating and dysuria.   Musculoskeletal:  Negative for arthralgias and gait problem.   Neurological:  Positive for weakness. Negative for dizziness and headaches.   Psychiatric/Behavioral:  Negative for agitation and confusion.    Objective:     Vital Signs (Most Recent):  Temp: 98 °F (36.7 °C) (09/04/22 0948)  Pulse: 80 (09/04/22 1000)  Resp: 16 (09/04/22 0948)  BP: (!) 88/50 (09/04/22 0949)  SpO2: 97 % (09/04/22 0948)   Vital Signs (24h Range):  Temp:  [97.4 °F (36.3 °C)-98.9 °F (37.2 °C)] 98 °F (36.7 °C)  Pulse:  [70-99] 80  Resp:  [16-25] 16  SpO2:  [94 %-99 %] 97 %  BP: ()/(42-58) 88/50     Weight: 53.7 kg (118 lb 6.2 oz)  Body mass index is 20.97 kg/m².    Intake/Output Summary (Last 24 hours) at 9/4/2022 1101  Last data filed at 9/4/2022 0100  Gross per 24 hour   Intake 360 ml   Output 1675 ml   Net -1315 ml        Physical Exam  Vitals and nursing note reviewed.   Constitutional:       General: She is not in acute distress.     Appearance: Normal appearance. She is ill-appearing (chronically).   HENT:      Head: Normocephalic and atraumatic.      Right Ear: External ear normal.      Left Ear: External ear normal.      Nose: Nose normal.      Mouth/Throat:      Mouth: Mucous membranes are moist.      Pharynx: Oropharynx is clear.   Eyes:      General: No scleral icterus.     Pupils: Pupils are equal, round, and reactive to light.   Cardiovascular:       Rate and Rhythm: Normal rate and regular rhythm.   Pulmonary:      Effort: Pulmonary effort is normal. No respiratory distress.      Breath sounds: No wheezing or rales.   Abdominal:      General: Abdomen is flat. There is no distension.      Palpations: Abdomen is soft.      Tenderness: There is no abdominal tenderness.      Comments: +colostomy   Musculoskeletal:      Cervical back: Normal range of motion and neck supple.      Right lower leg: No edema.      Left lower leg: No edema.   Skin:     General: Skin is warm and dry.   Neurological:      General: No focal deficit present.      Mental Status: She is alert and oriented to person, place, and time. Mental status is at baseline.   Psychiatric:         Mood and Affect: Mood normal.         Behavior: Behavior normal.         Thought Content: Thought content normal.       Significant Labs: All pertinent labs within the past 24 hours have been reviewed.    Significant Imaging: I have reviewed all pertinent imaging results/findings within the past 24 hours.

## 2022-09-04 NOTE — ASSESSMENT & PLAN NOTE
S/p MI and s/p stents  Home Meds:  ASA 81mg/Plavix 75mg, Carvedilol 3.125mg bid.  Has Statin intolerance.  Hospital Meds:  ASA 81mg/Plavix 75mg

## 2022-09-04 NOTE — ASSESSMENT & PLAN NOTE
Patient with CAD s/p MI with known CM with Chronic systolic and diastolic CHF who presented with CHF.  Followed by Freddy Arenas MD at [facility].  BNP was 3698 and CXR with pulmonary edema on admission.  Repeat TTE on 6/4/2022 with EF down to 10% and Grade II DD with low normal RVSF.  Started on Lasix for diuresis, but course complicated with hypotension and hyponatremia.  Followed by Cardiology and Nephrology.  Urine output decent at 1675ml.  Sating well on RA this morning - passed 6 minute walk test and will not require home O2 on discharge.    Home Meds:  Carvedilol 3.125mg bid and Lasix 40mg  Hospital Meds:  Carvedilol 3.125mg bid, Lasix 40mg qday    Plan:  Follow up with Cardiology and Nephrology recommendations - continue diuresis.  Consider SGLT2 (nonformulary inpatient) on discharge.  Carvedilol d/c on 9/3/2022 by Cardiology.  Continue with current regimen  Continue with Fluid restrictions  Monitor I&Os

## 2022-09-04 NOTE — ASSESSMENT & PLAN NOTE
Patient with fall on 9/3/2022 while using walking.  Hit the back of her head.  CTH negative.  PT/OT consulted -  PT/OT on discharge

## 2022-09-04 NOTE — PROGRESS NOTES
"[facility] Nephrology Progress Note    Date of Admit: 2022    Chief Complaint/Reason for Consult     Shortness of Breath (Pt c/o difficulty breathing with associated weakness and nausea.  Pmh of CHF)    Reason for consult: Hyponatremia    Subjective:      History of Present Illness:  Odette Hart is a 66 y.o. female who  presented to [facility] on 22 with worsening shortness of breath. Has history of CHFrEF and has had multiple hospitalizations for dyspnea. BNP > 3k on presentation and pulmonary edema noted on CXR. EF of 10% on last Echo. Patient diuresed with IV Lasix but noted to have worsening hyponatremia prompting nephrology eval.    Interval history:     GOOD UOP. Patient had a fall yesterday- she was using her walker to go to the bathroom (did not notify nursing).  She thinks the walker "stuck" on something and she fell.  She hit the back of her head. No current c/o.    Review of Systems:  Pertinent positives and negatives are listed in HPI.  All other systems are reviewed and are negative.     Objective:   Last 24 Hour Vital Signs:  BP  Min: 82/42  Max: 120/53  Temp  Av.1 °F (36.7 °C)  Min: 97.4 °F (36.3 °C)  Max: 98.9 °F (37.2 °C)  Pulse  Av.8  Min: 70  Max: 99  Resp  Av  Min: 16  Max: 25  SpO2  Av.1 %  Min: 94 %  Max: 99 %  Body mass index is 20.97 kg/m².  I/O last 3 completed shifts:  In: 720 [P.O.:720]  Out:  [Urine:]    Physical Examination:  Gen: NAD, AAOx3  HEENT: NCAT, EOMI, PERRL, MM, pink conjunctiva  Neck: no thyroidmegaly, no LAD  Cardio: RRR, normal S1/S2, no MRG, JVP wnl  Lungs:  diminished.    Abd: soft non tender, non distended, normal bowel sounds, no hepatosplenomegaly, +colostomy.   Ext: 2+ pulses B/L, no clubbing, cyanosis, 1+ feet edema  Skin: warm, dry, no rashes noted  Neuro: CN 2-12 grossly intact, UE/LE motor 5/5, sensation intact  Psych: conversational, responsive      Laboratory:  Most Recent Data:    Recent Labs   Lab " 09/04/22  0443   *   K 4.0   CL 92*   CO2 28   BUN 8   CREATININE 0.7   CALCIUM 8.1*             Radiology:  CT Head Without Contrast   Final Result      No acute intracranial abnormality.         Electronically signed by: Vicente Nichols   Date:    09/03/2022   Time:    16:06      X-Ray Chest 1 View   Final Result      Slight improvement of aeration of the lungs bilaterally         Electronically signed by: Asael Ortiz   Date:    09/01/2022   Time:    11:27      X-Ray Chest AP Portable   Final Result      Cardiomegaly and findings suggestive of pulmonary edema/CHF although correlation for infectious process advised.         Electronically signed by: Lucy Tovar MD   Date:    08/26/2022   Time:    03:21           Assessment and Plan:      Odette Hart is a 66 y.o.female    Hyponatremia-hypervolemic  - does have history of chronic hyponatremia with baseline around 129  - On 8/27 noted to have worsening level to 125. Held Lasix and repeated NA level and was up to 131 but suspect labs error and was stagnant at 127  -failed trial of lasix/danielito  -Na improving on Samsca 3 times a week, hold danielito, but continue lasix and increase to 40.  Will need to get insurance approval of samsca for outpatient use prior to discharge.      Acute on chronic systolic heart failure  - severely reduced EF of 10% earlier this month  - Cardio following  - BP likely too low to tolerate ACE/ARB but defer to Cards  - see below.    Hypotension  - Remains on Coreg  -as above  -remains low and added low dose midodrine but will hold and defer hypotension to Dr. Cordova.  -place teds    Normocytic Anemia:  -RIGO noted.  -dosed venofer.    End stage HF as above:  -appropriate for home based palliative care and pt now open to it.  -consult placed.

## 2022-09-04 NOTE — ASSESSMENT & PLAN NOTE
Chronic with recent baseline of 129 and likely from Cardiorenal in origin.  Received Tolvaptan x 2 and now starting biw.  Followed by Nephrology.  Na stable at 130 today from 132 yesterday    Plan:  Follow up with Nephrology recommendations - Tolvaptan increased to 30mg three times weekly  Follow BMP

## 2022-09-04 NOTE — ASSESSMENT & PLAN NOTE
Home Meds:  Carvedilol 3.125mg bid and Lasix 40mg  SBP runs low outpatient.  Stable at 93/47 this morning  -Continue Lasix as above  -Monitor and adjust as needed

## 2022-09-04 NOTE — PROGRESS NOTES
"    Cardiology Progress Note    9/4/2022  1:25 PM    Subjective/Interim:      No complaints.  BP has been low.     Objective:   24-hour Vitals:  Temp:  [96.4 °F (35.8 °C)-98.9 °F (37.2 °C)] 96.4 °F (35.8 °C)  Pulse:  [76-99] 79  Resp:  [16-25] 16  SpO2:  [94 %-99 %] 96 %  BP: ()/(42-58) 82/46    Physical Examination:  Vitals: BP (!) 82/46   Pulse 79   Temp 96.4 °F (35.8 °C) (Oral)   Resp 16   Ht 5' 3" (1.6 m)   Wt 53.7 kg (118 lb 6.2 oz)   LMP  (LMP Unknown)   SpO2 96%   Breastfeeding No   BMI 20.97 kg/m²     Physical Exam  Constitutional:       General: She is not in acute distress.     Appearance: Normal appearance. She is well-developed. She is not ill-appearing or toxic-appearing.   Eyes:      Conjunctiva/sclera:      Right eye: Right conjunctiva is not injected. No hemorrhage.     Left eye: Left conjunctiva is not injected. No hemorrhage.  Neck:      Vascular: No JVD.   Cardiovascular:      Rate and Rhythm: Normal rate and regular rhythm.      Heart sounds: S1 normal and S2 normal. Murmur heard.   Systolic murmur is present with a grade of 2/6 at the lower left sternal border.     Gallop present. S3 sounds present.      Comments: ICD left chest wall.  Pulmonary:      Effort: Pulmonary effort is normal.      Breath sounds: No rales.   Chest:      Chest wall: No swelling or tenderness.   Abdominal:      Tenderness: There is no abdominal tenderness.      Comments: Colostomy.   Musculoskeletal:      Right ankle: No swelling.      Left ankle: No swelling.   Skin:     General: Skin is warm and dry.      Findings: No rash.   Neurological:      General: No focal deficit present.      Mental Status: She is alert and oriented to person, place, and time. She is not disoriented.   Psychiatric:         Attention and Perception: Attention normal.         Mood and Affect: Mood normal.         Speech: Speech normal.         Behavior: Behavior normal.         Thought Content: Thought content normal.         " Cognition and Memory: Cognition and memory normal.         Judgment: Judgment normal.         Intake/Output Summary (Last 24 hours) at 9/4/2022 1325  Last data filed at 9/4/2022 0100  Gross per 24 hour   Intake 120 ml   Output 1375 ml   Net -1255 ml         Laboratory:  Trended Lab Data:  Recent Labs   Lab 09/04/22  0443   WBC 3.29*   HGB 9.5*   HCT 30.5*          Recent Labs   Lab 08/30/22  0937 08/31/22  0523 09/02/22  0611 09/03/22  0456 09/04/22  0443   NA  --    < > 129* 132* 130*   K  --    < > 4.7 4.3 4.0   CL  --    < > 91* 94* 92*   CO2  --    < > 27 28 28   BUN  --    < > 13 12 8   GLU  --    < > 88 120* 118*   CALCIUM  --    < > 8.3* 8.2* 8.1*   MG 1.8  --   --   --   --     < > = values in this interval not displayed.         Recent Labs   Lab 08/29/22  0517   PROT 6.7   ALBUMIN 1.9*   BILITOT 1.1*   AST 43*   ALT 34   ALKPHOS 108         No results for input(s): PROTIME, PTT, INR in the last 168 hours.    Cardiac:   Recent Labs   Lab 08/30/22  0551 09/01/22  0110   TROPONINI  --  0.017   BNP 3,420*  --          FLP:   Lab Results   Component Value Date    CHOL 106 (L) 08/30/2022    HDL 13 (L) 08/30/2022    LDLCALC 70.6 08/30/2022    TRIG 112 08/30/2022    CHOLHDL 12.3 (L) 08/30/2022     DM:   Lab Results   Component Value Date    HGBA1C 5.0 08/26/2022    HGBA1C 4.9 06/08/2022    HGBA1C 5.0 05/24/2022    LDLCALC 70.6 08/30/2022    CREATININE 0.7 09/04/2022     Thyroid:   Lab Results   Component Value Date    TSH 1.682 08/09/2022    FREET4 1.05 10/02/2019     Anemia:   Lab Results   Component Value Date    IRON 22 (L) 08/29/2022    TIBC 280 08/29/2022    FERRITIN 438 (H) 08/29/2022    NSSZJWFQ02 1452 (H) 08/29/2022    FOLATE 12.1 08/29/2022     Urinalysis:   Lab Results   Component Value Date    LABURIN ENTEROBACTER CLOACAE  10,000 - 49,999 cfu/ml   (A) 08/01/2022    LABURIN (A) 08/01/2022     KLEBSIELLA OXYTOCA  10,000 - 49,999 cfu/ml  No other significant isolate      COLORU Yellow 08/28/2022     CLARITYU Cloudy 12/16/2021    SPECGRAV 1.010 08/28/2022    NITRITE Negative 08/28/2022    KETONESU Negative 08/28/2022    UROBILINOGEN Negative 08/28/2022     @      Other Results:  EKG (my interpretation):    TELEMETRY:  Sinus rhythm rate 70-80    Echo:   Results for orders placed or performed during the hospital encounter of 08/01/22   Echo   Result Value Ref Range    Ascending aorta 2.82 cm    STJ 2.71 cm    AV mean gradient 4 mmHg    Ao peak thad 1.36 m/s    Ao VTI 21.57 cm    IVS 0.79 0.6 - 1.1 cm    LA size 5.19 cm    Left Atrium Major Axis 5.95 cm    Left Atrium Minor Axis 5.93 cm    LVIDd 6.26 (A) 3.5 - 6.0 cm    LVIDs 5.85 (A) 2.1 - 4.0 cm    LVOT diameter 1.76 cm    LVOT peak VTI 14.62 cm    Posterior Wall 0.66 0.6 - 1.1 cm    MV Peak A Thad 0.62 m/s    E wave deceleration time 114.74 msec    MV Peak E Thad 0.82 m/s    RA Major Axis 4.63 cm    RA Width 3.11 cm    RVDD 3.51 cm    Sinus 2.60 cm    TAPSE 1.40 cm    TR Max Thad 3.20 m/s    TDI LATERAL 0.03 m/s    TDI SEPTAL 0.05 m/s    LA WIDTH 4.35 cm    MV stenosis pressure 1/2 time 33.28 ms    LV Diastolic Volume 198.02 mL    LV Systolic Volume 169.54 mL    RV S' 8.58 cm/s    LVOT peak thad 1.09 m/s    LA volume (mod) 71.41 cm3    LV LATERAL E/E' RATIO 27.33 m/s    LV SEPTAL E/E' RATIO 16.40 m/s    FS 7 %    LA volume 113.99 cm3    LV mass 177.82 g    Left Ventricle Relative Wall Thickness 0.21 cm    AV valve area 1.65 cm2    AV Velocity Ratio 0.80     AV index (prosthetic) 0.68     MV valve area p 1/2 method 6.61 cm2    E/A ratio 1.32     Mean e' 0.04 m/s    LVOT area 2.4 cm2    LVOT stroke volume 35.55 cm3    AV peak gradient 7 mmHg    E/E' ratio 20.50 m/s    Triscuspid Valve Regurgitation Peak Gradient 41 mmHg    BSA 1.54 m2    LV Systolic Volume Index 109.4 mL/m2    LV Diastolic Volume Index 127.75 mL/m2    LA Volume Index 73.5 mL/m2    LV Mass Index 115 g/m2    LA Volume Index (Mod) 46.1 mL/m2    EF 10 %    Narrative    · The left ventricle is moderately  enlarged with eccentric hypertrophy and   severely decreased systolic function.  · The estimated ejection fraction is 10%.  · Grade II left ventricular diastolic dysfunction.  · Moderate left atrial enlargement.  · Moderate mitral regurgitation.  · No obvious endocarditis  · Normal right ventricular size with low normal right ventricular systolic   function.  · Mild to moderate tricuspid regurgitation.  · Mild aortic regurgitation.  · There are segmental left ventricular wall motion abnormalities. Akinetic   apex. No thrombus.          Radiology:  X-Ray Chest 1 View    Result Date: 9/1/2022  EXAMINATION: XR CHEST 1 VIEW CLINICAL HISTORY: CHF; TECHNIQUE: Single frontal view of the chest was performed. COMPARISON: Chest radiograph 08/26/2022 FINDINGS: Pacing device is noted in the left chest, with transvenous leads remaining in stable position. Unchanged enlargement of the cardiomediastinal silhouette.  Lungs appear symmetrically expanded, with slight improvement of interstitial and parenchymal attenuation, as well as patchy bibasilar opacities. Osseous structures appear intact.     Slight improvement of aeration of the lungs bilaterally Electronically signed by: Asael Ortiz Date:    09/01/2022 Time:    11:27    CTA Chest Non Coronary    Result Date: 8/3/2022  EXAMINATION: CTA CHEST NON CORONARY CLINICAL HISTORY: Pulmonary embolism (PE) suspected, positive D-dimer;SOB, elevated d dimer; TECHNIQUE: Low dose axial images, sagittal and coronal reformations were obtained from the thoracic inlet to the lung bases following the IV administration of 75 mL of Omnipaque 350.  Contrast timing was optimized to evaluate the pulmonary arteries.  Maximum intensity projection images were provided for review. COMPARISON: Chest radiograph 08/01/2022.  CT abdomen pelvis 08/01/2022.  Nuclear medicine lung ventilation scan 05/18/2022. FINDINGS: Pulmonary vasculature: Satisfactory opacification of the pulmonary arterial system with no  filling defect to the segmental level. Aorta: Left-sided aortic arch.  No aneurysm.  Atherosclerosis of the aortic arch, coronary arteries, and descending aorta. Base of Neck: No significant abnormality. Thoracic soft tissues: Left-sided cardiac pacer device with cardiac leads are noted. Heart: Mild-moderate enlargement of the left atrium..  No pericardial effusion.  There is reflux of contrast into the hepatic veins consistent with right heart failure versus tricuspid valvular disease. Nafisa/Mediastinum: No pathologic adam enlargement. Airways: The large airways are patent. No foci of endobronchial filling. Lungs/Pleura: Scattered ground-glass and consolidative opacities in the bilateral lower lobes, right greater than left.  Mild, smooth interlobular septal thickening.  Small-moderate right and trace-small left pleural effusion.  Dependent atelectasis in the bilateral lungs. Esophagus: Unremarkable Upper Abdomen: No abnormality of the partially imaged upper abdomen. Bones: No acute fracture. No suspicious lytic or sclerotic lesions.     No evidence of pulmonary thromboembolism to the subsegmental branches. Pulmonary edema with small right greater than left pleural effusions. Findings consistent with right heart failure and/or tricuspid valvular disease. Electronically signed by resident: Marbella Monaco Date:    08/03/2022 Time:    10:56 Electronically signed by: Nikki Valencia Date:    08/03/2022 Time:    11:42    CT Abdomen Pelvis With Contrast    Result Date: 8/18/2022  EXAMINATION: CT ABDOMEN PELVIS WITH CONTRAST CLINICAL HISTORY: Sepsis;Abdominal pain, acute, nonlocalized; TECHNIQUE: Low dose axial images, sagittal and coronal reformations were obtained from the lung bases to the pubic symphysis following the IV administration of 75 mL of Omnipaque 350 .  Oral contrast was not given. COMPARISON: 08/01/2022 FINDINGS: Images of the lower thorax are remarkable for bilateral dependent atelectasis.  There is  bilateral interlobular septal thickening and fibrotic change primarily involving the periphery of the bilateral lower lobes.  There is a calcified granuloma within the right lower lobe.  There are a few scattered tree-in-bud type nodules within the periphery of the right lower lobe, possibly new since the previous exam although pleural effusion on previous exam limits direct comparison.  There is a trace right pleural effusion, no significant left pleural effusion pacer wires noted. The liver is hypoattenuating, may reflect contrast phase however correlation with LFTs is advised as findings can be seen with steatosis.  The spleen, pancreas and right adrenal gland are unremarkable.  There is nodular thickening of the left adrenal gland.  The gallbladder is surgically absent.  No significant biliary dilation status post cholecystectomy.  There is a small hiatal hernia.  The stomach is nondistended, no significant gastric wall thickening.  The portal vein, splenic vein, SMV, celiac axis and SMA all are patent.  No significant abdominal lymphadenopathy. The kidneys enhance symmetrically without hydronephrosis or nephrolithiasis.  There are subcentimeter low attenuating lesions within the interpolar region of the left kidney, too small for characterization.  The bilateral ureters are unable to be followed in their entirety to the urinary bladder, no definite calculi seen or secondary findings to suggest obstructive uropathy.  The urinary bladder is relatively decompressed.  Motion artifact limits evaluation of the deep pelvis.  There is indistinctness about the uterus.  There is a small amount of fluid in the pelvis. Surgical changes are noted of distal colectomy.  The rectal stump appears intact.  There is moderate to large amount of stool within the residual large bowel.  The appendix and terminal ileum are unremarkable.  The ostomy site within the left anterior abdominal wall appears intact.  The small bowel is  grossly unremarkable.  No focal organized pelvic fluid collection.  There is prominent atherosclerotic plaque and calcification of the aorta and its branches. Degenerative changes are noted of the bilateral sacroiliac joints, femoroacetabular joints, and spine.  There is osteopenia.  No significant inguinal lymphadenopathy. Surgical changes are noted of the anterior abdominal wall.  There is rim enhancing fluid within the anterior abdominal wall incision, just cranial to the umbilicus and appears new since the previous examination.  The collection is difficult to measure given its lobular nature however measures approximately 2.2 x 1.1 x 1.4 cm.  Caudal to the umbilicus, an additional fluid collection is identified, appears more organized than on the previous examination and measures approximately 2.4 x 1.2 x 3.2 cm.  Caudal to this collection, an additional collection is noted, possibly contiguous with the above measuring approximately 1.1 x 1.1 cm peer there is heterogeneous air at the level of the umbilicus, likely reflecting air related to packing material.  Please note, the transverse colon abuts this region however no convincing fistula, correlation however is advised.  There are scattered right gluteal calcifications suggesting injection granuloma.  In comparison to the previous examination, there has been possible interval incision and drainage involving the posterior body wall posterior to the sacrum.  Induration remains in the region without focal organized collection.     This report was flagged in Epic as abnormal. 1. Rim enhancing collections along the anterior abdominal incision, collections cranial to the umbilicus appear new since the previous exam.  Collections inferior to the umbilicus please note, the transverse colon closely abuts these regions, no direct fistulous communication is identified although correlation is recommended. 2. Moderate stool within the residual colon upstream of the  colostomy.  No findings to suggest high-grade obstruction however correlation with ostomy output advised. 3. Possible hepatic steatosis, correlation with LFTs recommended. 4. Improved pleural effusions bilaterally. 5. Scattered tree-in-bud type nodules within the right lower lobe, developing infectious or inflammatory process not excluded.  Follow-up is advised. 6. Please see above for several additional findings. Electronically signed by: Bret Kamara MD Date:    08/18/2022 Time:    12:26    X-Ray Chest AP Portable    Result Date: 8/26/2022  EXAMINATION: XR CHEST AP PORTABLE CLINICAL HISTORY: Shortness of breath TECHNIQUE: Single frontal view of the chest was performed. COMPARISON: 08/01/2021 FINDINGS: Stably positioned left-sided cardiac pacing device in place.  There is unchanged enlargement of the cardiomediastinal silhouette.  Mediastinal structures are midline.  The lungs are symmetrically expanded with diffuse increased interstitial and parenchymal attenuation and a few patchy bibasilar opacities.  Findings can be seen in the setting of pulmonary edema/CHF although correlation for infectious process advised.  No substantial volume of pleural fluid or pneumothorax identified.  Osseous structures are intact.     Cardiomegaly and findings suggestive of pulmonary edema/CHF although correlation for infectious process advised. Electronically signed by: Lucy Tovar MD Date:    08/26/2022 Time:    03:21    Echo    Result Date: 8/4/2022  · The left ventricle is moderately enlarged with eccentric hypertrophy and severely decreased systolic function. · The estimated ejection fraction is 10%. · Grade II left ventricular diastolic dysfunction. · Moderate left atrial enlargement. · Moderate mitral regurgitation. · No obvious endocarditis · Normal right ventricular size with low normal right ventricular systolic function. · Mild to moderate tricuspid regurgitation. · Mild aortic regurgitation. · There are segmental left  ventricular wall motion abnormalities. Akinetic apex. No thrombus.          Current Medications:     Infusions:       Scheduled:   aspirin  81 mg Oral Daily    clopidogreL  75 mg Oral Daily    docusate sodium  100 mg Oral BID    enoxaparin  40 mg Subcutaneous Daily    ezetimibe  10 mg Oral QHS    furosemide  40 mg Oral Daily    midodrine  5 mg Oral BID WM    pantoprazole  40 mg Oral Daily    tolvaptan  30 mg Oral Every Mon, Wed, Fri        PRN:  ALPRAZolam, ondansetron, polyethylene glycol, simethicone, sodium chloride 0.9%, traMADoL     Assessment and Plan:     Odette Hart is a 66 y.o.female with  Coronary Artery Disease, stable  Continue aspirin 81 mg and clopidogrel 75 mg daily    Heart Failure, Systolic & Diastolic, Acute on Chronic  Continue furosemide 40mg qd.  BP is too low for ARB or BB at present  Agree with midodrine for BP.  Discussed with Dr Alvarado.      HLP  On zetia    ICD in place         Tony Wade MD        Disclaimer: This document was created using voice recognition software (M*Modal Fluency Direct). Although it may be edited, this document may contain errors related to incorrect recognition of the spoken word. Please call the physician if clarification is needed.

## 2022-09-05 NOTE — PROGRESS NOTES
"[facility]    Date of Admit: 2022    Chief Complaint/Reason for Consult     Shortness of Breath (Pt c/o difficulty breathing with associated weakness and nausea.  Pmh of CHF)    Reason for consult: Hyponatremia    Subjective:      History of Present Illness:  Odette Hart is a 66 y.o. female who  presented to [facility] on 22 with worsening shortness of breath. Has history of CHFrEF and has had multiple hospitalizations for dyspnea. BNP > 3k on presentation and pulmonary edema noted on CXR. EF of 10% on last Echo. Patient diuresed with IV Lasix but noted to have worsening hyponatremia prompting nephrology eval.    Interval history:     GOOD UOP. Patient had a fall Saturday, she was using her walker to go to the bathroom (did not notify nursing).  She thinks the walker "stuck" on something and she fell.  She hit the back of her head. No current c/o.    Review of Systems:  Pertinent positives and negatives are listed in HPI.  All other systems are reviewed and are negative.     Objective:   Last 24 Hour Vital Signs:  BP  Min: 81/42  Max: 108/64  Temp  Av.8 °F (36.6 °C)  Min: 96.4 °F (35.8 °C)  Max: 98.2 °F (36.8 °C)  Pulse  Av.1  Min: 75  Max: 102  Resp  Av  Min: 16  Max: 20  SpO2  Av %  Min: 93 %  Max: 100 %  Body mass index is 20.97 kg/m².  I/O last 3 completed shifts:  In: 400 [P.O.:400]  Out: 825 [Urine:825]    Physical Examination:  Gen: NAD, AAOx3  HEENT: NCAT, EOMI, PERRL, MM, pink conjunctiva  Neck: no thyroidmegaly, no LAD  Cardio: RRR, normal S1/S2, no MRG, JVP wnl  Lungs:  diminished.    Abd: soft non tender, non distended, normal bowel sounds, no hepatosplenomegaly, +colostomy.   Ext: 2+ pulses B/L, no clubbing, cyanosis, 1+ feet edema  Skin: warm, dry, no rashes noted  Neuro: CN 2-12 grossly intact, UE/LE motor 5/5, sensation intact  Psych: conversational, responsive      Laboratory:  Most Recent Data:    Recent Labs   Lab " 09/05/22  0546   *   K 4.4   CL 89*   CO2 30*   BUN 9   CREATININE 0.6   CALCIUM 8.1*             Radiology:  CT Head Without Contrast   Final Result      No acute intracranial abnormality.         Electronically signed by: Vicente Nichols   Date:    09/03/2022   Time:    16:06      X-Ray Chest 1 View   Final Result      Slight improvement of aeration of the lungs bilaterally         Electronically signed by: Asael Ortiz   Date:    09/01/2022   Time:    11:27      X-Ray Chest AP Portable   Final Result      Cardiomegaly and findings suggestive of pulmonary edema/CHF although correlation for infectious process advised.         Electronically signed by: Lucy Tovar MD   Date:    08/26/2022   Time:    03:21           Assessment and Plan:      Odette Hart is a 66 y.o.female    Hyponatremia-hypervolemic  - does have history of chronic hyponatremia with baseline around 129  - On 8/27 noted to have worsening level to 125. Held Lasix and repeated NA level and was up to 131 but suspect labs error and was stagnant at 127  -failed trial of lasix/danielito  -Na improving on Samsca 3 times a week, hold danielito, but continue lasix 40.  Will need to get insurance approval of samsca for outpatient use prior to discharge.  Plan for samsca today      Acute on chronic systolic heart failure  - severely reduced EF of 10% earlier this month  - Cardio following  - BP likely too low to tolerate ACE/ARB but defer to Cards  - see below.    Hypotension  - Remains on Coreg  -as above  -remains low and added low dose midodrine but will hold and defer hypotension to Dr. Cordova.  -place teds    Normocytic Anemia:  -RIGO noted.  -dosed venofer.    End stage HF as above:  -appropriate for home based palliative care and pt now open to it.  -consult placed.

## 2022-09-05 NOTE — PROGRESS NOTES
"[facility], [place], operated by [facility]  Progress Note    Patient Name: Odette Hart  MRN: [id number]  Patient Class: IP- Inpatient   Admission Date: 8/26/2022  Length of Stay: 10 days  Attending Physician: Marco Antonio Alvaraod MD  Primary Care Provider: Braxton Barragan MD        Subjective:     Principal Problem:Acute combined systolic and diastolic congestive heart failure        HPI:  Per Michael Long, NP:  "The patient is a 66 y.o. female with a past medical history of MI, HLD, CAD, cardiomyopathy, HTN, and CHF who presents for evaluation of shortness of breath onset earlier today. She reports "I've been feeling bad," endorsing associated symptoms of nausea and weakness. She states she has been near vomiting but denies any vomiting episodes. She reports elevated temperature at 99 F last night. She states her current presentations feel similar to her previous hospitalizations for dyspnea. Pt is regularly followed by cardiology. She denies pain elsewhere and other somatic complaints.  BNP elevated to greater than 3000 with pulmonary edema noted on CXR.  She will be admitted for further management of her acute on chronic systolic congestive heart failure."      Overview/Hospital Course:  Odette Hart was admitted for CHF exacerbation. Diuresing with IVP lasix, cardiology following along. During stay her chronic hyponatremia worsened, nephrology consulted. Holding lasix, giving tolvaptan x 2 with improvement in sodium.       Given her advanced heart failure, palliative care consulted and recommending palliative based care at home (referral placed)      Interval History: Patient is awake and alert this morning.  Feels tired this morning.  No adverse events reported overnight.      Review of Systems   Constitutional:  Negative for chills and fever.   HENT:  Negative for ear pain.    Eyes:  Negative for pain.   Respiratory:  Positive for shortness of breath (improving). Negative for cough and wheezing.  "   Cardiovascular:  Negative for chest pain, palpitations and leg swelling.   Gastrointestinal:  Negative for abdominal pain, constipation, diarrhea, nausea and vomiting.   Genitourinary:  Negative for difficulty urinating and dysuria.   Musculoskeletal:  Negative for arthralgias and gait problem.   Neurological:  Positive for weakness. Negative for dizziness and headaches.   Psychiatric/Behavioral:  Negative for agitation and confusion.    Objective:     Vital Signs (Most Recent):  Temp: 98.2 °F (36.8 °C) (09/05/22 1144)  Pulse: 88 (09/05/22 1200)  Resp: 20 (09/05/22 1144)  BP: (!) 98/59 (09/05/22 1144)  SpO2: (!) 94 % (09/05/22 1144)   Vital Signs (24h Range):  Temp:  [97.7 °F (36.5 °C)-98.2 °F (36.8 °C)] 98.2 °F (36.8 °C)  Pulse:  [] 88  Resp:  [16-20] 20  SpO2:  [93 %-100 %] 94 %  BP: ()/(42-65) 98/59     Weight: 53.7 kg (118 lb 6.2 oz)  Body mass index is 20.97 kg/m².    Intake/Output Summary (Last 24 hours) at 9/5/2022 1247  Last data filed at 9/5/2022 1200  Gross per 24 hour   Intake 400 ml   Output 750 ml   Net -350 ml        Physical Exam  Vitals and nursing note reviewed.   Constitutional:       General: She is not in acute distress.     Appearance: Normal appearance. She is ill-appearing (chronically).   HENT:      Head: Normocephalic and atraumatic.      Right Ear: External ear normal.      Left Ear: External ear normal.      Nose: Nose normal.      Mouth/Throat:      Mouth: Mucous membranes are moist.      Pharynx: Oropharynx is clear.   Eyes:      General: No scleral icterus.     Pupils: Pupils are equal, round, and reactive to light.   Cardiovascular:      Rate and Rhythm: Normal rate and regular rhythm.   Pulmonary:      Effort: Pulmonary effort is normal. No respiratory distress.      Breath sounds: No wheezing or rales.   Abdominal:      General: Abdomen is flat. There is no distension.      Palpations: Abdomen is soft.      Tenderness: There is no abdominal tenderness.      Comments:  +colostomy   Musculoskeletal:      Cervical back: Normal range of motion and neck supple.      Right lower leg: No edema.      Left lower leg: No edema.   Skin:     General: Skin is warm and dry.   Neurological:      General: No focal deficit present.      Mental Status: She is alert and oriented to person, place, and time. Mental status is at baseline.   Psychiatric:         Mood and Affect: Mood normal.         Behavior: Behavior normal.         Thought Content: Thought content normal.       Significant Labs: All pertinent labs within the past 24 hours have been reviewed.    Significant Imaging: I have reviewed all pertinent imaging results/findings within the past 24 hours.      Assessment/Plan:      * Acute combined systolic and diastolic congestive heart failure  Patient with CAD s/p MI with known CM with Chronic systolic and diastolic CHF who presented with CHF.  Followed by Freddy Arenas MD at [facility].  BNP was 3698 and CXR with pulmonary edema on admission.  Repeat TTE on 6/4/2022 with EF down to 10% and Grade II DD with low normal RVSF.  Started on Lasix for diuresis, but course complicated with hypotension and hyponatremia.  Carvedilol d/c on 9/3/2022 by Cardiology and Midodrine added back on 9/4/2022.  SBP a little improved today.  Urine output recorded only as 450ml overnight.  Sating well on RA this morning - passed 6 minute walk test.    Home Meds:  Carvedilol 3.125mg bid and Lasix 40mg  Hospital Meds:  Carvedilol 3.125mg bid, Lasix 40mg qday    Plan:  Follow up with Cardiology and Nephrology recommendations - continue diuresis.  Continue Midodrine.  Consider SGLT2 (nonformulary inpatient) on discharge.    Continue with Fluid restrictions  Monitor I&Os        Hyponatremia  Chronic with recent baseline of 129 and likely from Cardiorenal in origin.  Received Tolvaptan x 2 and now starting biw.  Followed by Nephrology.  Na stable at 128 today from 130 yesterday    Plan:  Follow up with Nephrology  recommendations - Tolvaptan increased to 30mg three times weekly  Follow BMP           Encounter for palliative care  Followed by Palliative Care  Patient is Full Code      Debility  Patient with fall on 9/3/2022 while using walking.  Hit the back of her head.  CTH negative.   PT/OT consulted -  PT/OT on discharge.          Primary hypertension  Home Meds:  Carvedilol 3.125mg bid and Lasix 40mg  SBP runs low outpatient.  SBP a little better today after addition of Midodrine.  -See above  -Continue Lasix and Midodrine as above  -Monitor and adjust as needed      Familial hypercholesterolemia  Has previous Statin intolerance.  -Continue Zetia      Coronary artery disease of native artery of native heart with stable angina pectoris  S/p MI and s/p stents  Home Meds:  ASA 81mg/Plavix 75mg, Carvedilol 3.125mg bid.  Has Statin intolerance.  Hospital Meds:  ASA 81mg/Plavix 75mg        VTE Risk Mitigation (From admission, onward)         Ordered     Place JESS hose  Until discontinued         09/02/22 0910     enoxaparin injection 40 mg  Daily         08/26/22 0325     IP VTE HIGH RISK PATIENT  Once         08/26/22 0325     Place sequential compression device  Until discontinued         08/26/22 0325                Discharge Planning   SIVAN:      Code Status: Full Code   Is the patient medically ready for discharge?:     Reason for patient still in hospital (select all that apply): Patient trending condition, Treatment and Consult recommendations  Discharge Plan A: Home with family                  Marco Antonio Alvarado MD  Department of Hospital Medicine   [facility] ([place])

## 2022-09-05 NOTE — ASSESSMENT & PLAN NOTE
Home Meds:  Carvedilol 3.125mg bid and Lasix 40mg  SBP runs low outpatient.  SBP a little better today after addition of Midodrine.  -See above  -Continue Lasix and Midodrine as above  -Monitor and adjust as needed

## 2022-09-05 NOTE — ASSESSMENT & PLAN NOTE
Patient with CAD s/p MI with known CM with Chronic systolic and diastolic CHF who presented with CHF.  Followed by Freddy Arenas MD at [facility].  BNP was 3698 and CXR with pulmonary edema on admission.  Repeat TTE on 6/4/2022 with EF down to 10% and Grade II DD with low normal RVSF.  Started on Lasix for diuresis, but course complicated with hypotension and hyponatremia.  Carvedilol d/c on 9/3/2022 by Cardiology and Midodrine added back on 9/4/2022.  SBP a little improved today.  Urine output recorded only as 450ml overnight.  Sating well on RA this morning - passed 6 minute walk test.    Home Meds:  Carvedilol 3.125mg bid and Lasix 40mg  Hospital Meds:  Carvedilol 3.125mg bid, Lasix 40mg qday    Plan:  Follow up with Cardiology and Nephrology recommendations - continue diuresis.  Continue Midodrine.  Consider SGLT2 (nonformulary inpatient) on discharge.    Continue with Fluid restrictions  Monitor I&Os

## 2022-09-05 NOTE — PLAN OF CARE
POC reviewed w/ pt and purposeful rounding complete. Meds administered per MAR. PRN pain meds administered for c/o hip pain w/ relief obtained. Pt AAOx4 and 1 assist to ambulate. Safety precautions intact; no injuries or falls this shift. Pt denies needs at this time; will continue to monitor.

## 2022-09-05 NOTE — ASSESSMENT & PLAN NOTE
Patient with fall on 9/3/2022 while using walking.  Hit the back of her head.  CTH negative.   PT/OT consulted -  PT/OT on discharge.

## 2022-09-05 NOTE — SUBJECTIVE & OBJECTIVE
Interval History: Patient is awake and alert this morning.  Feels tired this morning.  No adverse events reported overnight.      Review of Systems   Constitutional:  Negative for chills and fever.   HENT:  Negative for ear pain.    Eyes:  Negative for pain.   Respiratory:  Positive for shortness of breath (improving). Negative for cough and wheezing.    Cardiovascular:  Negative for chest pain, palpitations and leg swelling.   Gastrointestinal:  Negative for abdominal pain, constipation, diarrhea, nausea and vomiting.   Genitourinary:  Negative for difficulty urinating and dysuria.   Musculoskeletal:  Negative for arthralgias and gait problem.   Neurological:  Positive for weakness. Negative for dizziness and headaches.   Psychiatric/Behavioral:  Negative for agitation and confusion.    Objective:     Vital Signs (Most Recent):  Temp: 98.2 °F (36.8 °C) (09/05/22 1144)  Pulse: 88 (09/05/22 1200)  Resp: 20 (09/05/22 1144)  BP: (!) 98/59 (09/05/22 1144)  SpO2: (!) 94 % (09/05/22 1144)   Vital Signs (24h Range):  Temp:  [97.7 °F (36.5 °C)-98.2 °F (36.8 °C)] 98.2 °F (36.8 °C)  Pulse:  [] 88  Resp:  [16-20] 20  SpO2:  [93 %-100 %] 94 %  BP: ()/(42-65) 98/59     Weight: 53.7 kg (118 lb 6.2 oz)  Body mass index is 20.97 kg/m².    Intake/Output Summary (Last 24 hours) at 9/5/2022 1247  Last data filed at 9/5/2022 1200  Gross per 24 hour   Intake 400 ml   Output 750 ml   Net -350 ml        Physical Exam  Vitals and nursing note reviewed.   Constitutional:       General: She is not in acute distress.     Appearance: Normal appearance. She is ill-appearing (chronically).   HENT:      Head: Normocephalic and atraumatic.      Right Ear: External ear normal.      Left Ear: External ear normal.      Nose: Nose normal.      Mouth/Throat:      Mouth: Mucous membranes are moist.      Pharynx: Oropharynx is clear.   Eyes:      General: No scleral icterus.     Pupils: Pupils are equal, round, and reactive to light.    Cardiovascular:      Rate and Rhythm: Normal rate and regular rhythm.   Pulmonary:      Effort: Pulmonary effort is normal. No respiratory distress.      Breath sounds: No wheezing or rales.   Abdominal:      General: Abdomen is flat. There is no distension.      Palpations: Abdomen is soft.      Tenderness: There is no abdominal tenderness.      Comments: +colostomy   Musculoskeletal:      Cervical back: Normal range of motion and neck supple.      Right lower leg: No edema.      Left lower leg: No edema.   Skin:     General: Skin is warm and dry.   Neurological:      General: No focal deficit present.      Mental Status: She is alert and oriented to person, place, and time. Mental status is at baseline.   Psychiatric:         Mood and Affect: Mood normal.         Behavior: Behavior normal.         Thought Content: Thought content normal.       Significant Labs: All pertinent labs within the past 24 hours have been reviewed.    Significant Imaging: I have reviewed all pertinent imaging results/findings within the past 24 hours.

## 2022-09-05 NOTE — ASSESSMENT & PLAN NOTE
Chronic with recent baseline of 129 and likely from Cardiorenal in origin.  Received Tolvaptan x 2 and now starting biw.  Followed by Nephrology.  Na stable at 128 today from 130 yesterday    Plan:  Follow up with Nephrology recommendations - Tolvaptan increased to 30mg three times weekly  Follow BMP

## 2022-09-05 NOTE — PROGRESS NOTES
"    Cardiology Progress Note    9/5/2022  10:40 AM    Subjective/Interim:      Reported CP last night that she points to her epigastrum and stated that it's the same pain she has been having since January.  No pain this am.       Objective:   24-hour Vitals:  Temp:  [96.4 °F (35.8 °C)-98.2 °F (36.8 °C)] 98.2 °F (36.8 °C)  Pulse:  [] 96  Resp:  [16-20] 16  SpO2:  [93 %-100 %] 94 %  BP: ()/(42-65) 105/65    Physical Examination:  Vitals: /65   Pulse 96   Temp 98.2 °F (36.8 °C)   Resp 16   Ht 5' 3" (1.6 m)   Wt 53.7 kg (118 lb 6.2 oz)   LMP  (LMP Unknown)   SpO2 (!) 94%   Breastfeeding No   BMI 20.97 kg/m²     Physical Exam  Constitutional:       General: She is not in acute distress.     Appearance: Normal appearance. She is well-developed. She is not ill-appearing or toxic-appearing.   Eyes:      Conjunctiva/sclera:      Right eye: Right conjunctiva is not injected. No hemorrhage.     Left eye: Left conjunctiva is not injected. No hemorrhage.  Neck:      Vascular: No JVD.   Cardiovascular:      Rate and Rhythm: Normal rate and regular rhythm.      Heart sounds: S1 normal and S2 normal. Murmur heard.   Systolic murmur is present with a grade of 2/6 at the lower left sternal border.     Gallop present. S3 sounds present.      Comments: ICD left chest wall.  Pulmonary:      Effort: Pulmonary effort is normal.      Breath sounds: No rales.   Chest:      Chest wall: No swelling or tenderness.   Abdominal:      Tenderness: There is no abdominal tenderness.      Comments: Colostomy.   Musculoskeletal:      Right ankle: No swelling.      Left ankle: No swelling.   Skin:     General: Skin is warm and dry.      Findings: No rash.   Neurological:      General: No focal deficit present.      Mental Status: She is alert and oriented to person, place, and time. She is not disoriented.   Psychiatric:         Attention and Perception: Attention normal.         Mood and Affect: Mood normal.         Speech: " Speech normal.         Behavior: Behavior normal.         Thought Content: Thought content normal.         Cognition and Memory: Cognition and memory normal.         Judgment: Judgment normal.         Intake/Output Summary (Last 24 hours) at 9/5/2022 1040  Last data filed at 9/4/2022 2300  Gross per 24 hour   Intake 400 ml   Output 450 ml   Net -50 ml         Laboratory:  Trended Lab Data:  Recent Labs   Lab 09/04/22  0443   WBC 3.29*   HGB 9.5*   HCT 30.5*            Recent Labs   Lab 08/30/22  0937 08/31/22  0523 09/03/22  0456 09/04/22  0443 09/05/22  0546   NA  --    < > 132* 130* 128*   K  --    < > 4.3 4.0 4.4   CL  --    < > 94* 92* 89*   CO2  --    < > 28 28 30*   BUN  --    < > 12 8 9   GLU  --    < > 120* 118* 92   CALCIUM  --    < > 8.2* 8.1* 8.1*   MG 1.8  --   --   --   --     < > = values in this interval not displayed.         No results for input(s): PROT, ALBUMIN, BILITOT, AST, ALT, ALKPHOS in the last 168 hours.      No results for input(s): PROTIME, PTT, INR in the last 168 hours.    Cardiac:   Recent Labs   Lab 08/30/22  0551 09/01/22  0110   TROPONINI  --  0.017   BNP 3,420*  --          FLP:   Lab Results   Component Value Date    CHOL 106 (L) 08/30/2022    HDL 13 (L) 08/30/2022    LDLCALC 70.6 08/30/2022    TRIG 112 08/30/2022    CHOLHDL 12.3 (L) 08/30/2022     DM:   Lab Results   Component Value Date    HGBA1C 5.0 08/26/2022    HGBA1C 4.9 06/08/2022    HGBA1C 5.0 05/24/2022    LDLCALC 70.6 08/30/2022    CREATININE 0.6 09/05/2022     Thyroid:   Lab Results   Component Value Date    TSH 1.682 08/09/2022    FREET4 1.05 10/02/2019     Anemia:   Lab Results   Component Value Date    IRON 22 (L) 08/29/2022    TIBC 280 08/29/2022    FERRITIN 438 (H) 08/29/2022    NQFMQSFN17 1452 (H) 08/29/2022    FOLATE 12.1 08/29/2022     Urinalysis:   Lab Results   Component Value Date    LABURIN ENTEROBACTER CLOACAE  10,000 - 49,999 cfu/ml   (A) 08/01/2022    LABURIN (A) 08/01/2022     KLEBSIELLA  OXYTOCA  10,000 - 49,999 cfu/ml  No other significant isolate      COLORU Yellow 08/28/2022    CLARITYU Cloudy 12/16/2021    SPECGRAV 1.010 08/28/2022    NITRITE Negative 08/28/2022    KETONESU Negative 08/28/2022    UROBILINOGEN Negative 08/28/2022     @      Other Results:  EKG (my interpretation):    TELEMETRY:  Sinus rhythm rate 70-80    Echo:   Results for orders placed or performed during the hospital encounter of 08/01/22   Echo   Result Value Ref Range    Ascending aorta 2.82 cm    STJ 2.71 cm    AV mean gradient 4 mmHg    Ao peak thad 1.36 m/s    Ao VTI 21.57 cm    IVS 0.79 0.6 - 1.1 cm    LA size 5.19 cm    Left Atrium Major Axis 5.95 cm    Left Atrium Minor Axis 5.93 cm    LVIDd 6.26 (A) 3.5 - 6.0 cm    LVIDs 5.85 (A) 2.1 - 4.0 cm    LVOT diameter 1.76 cm    LVOT peak VTI 14.62 cm    Posterior Wall 0.66 0.6 - 1.1 cm    MV Peak A Thad 0.62 m/s    E wave deceleration time 114.74 msec    MV Peak E Thad 0.82 m/s    RA Major Axis 4.63 cm    RA Width 3.11 cm    RVDD 3.51 cm    Sinus 2.60 cm    TAPSE 1.40 cm    TR Max Thad 3.20 m/s    TDI LATERAL 0.03 m/s    TDI SEPTAL 0.05 m/s    LA WIDTH 4.35 cm    MV stenosis pressure 1/2 time 33.28 ms    LV Diastolic Volume 198.02 mL    LV Systolic Volume 169.54 mL    RV S' 8.58 cm/s    LVOT peak thad 1.09 m/s    LA volume (mod) 71.41 cm3    LV LATERAL E/E' RATIO 27.33 m/s    LV SEPTAL E/E' RATIO 16.40 m/s    FS 7 %    LA volume 113.99 cm3    LV mass 177.82 g    Left Ventricle Relative Wall Thickness 0.21 cm    AV valve area 1.65 cm2    AV Velocity Ratio 0.80     AV index (prosthetic) 0.68     MV valve area p 1/2 method 6.61 cm2    E/A ratio 1.32     Mean e' 0.04 m/s    LVOT area 2.4 cm2    LVOT stroke volume 35.55 cm3    AV peak gradient 7 mmHg    E/E' ratio 20.50 m/s    Triscuspid Valve Regurgitation Peak Gradient 41 mmHg    BSA 1.54 m2    LV Systolic Volume Index 109.4 mL/m2    LV Diastolic Volume Index 127.75 mL/m2    LA Volume Index 73.5 mL/m2    LV Mass Index 115 g/m2    LA  Volume Index (Mod) 46.1 mL/m2    EF 10 %    Narrative    · The left ventricle is moderately enlarged with eccentric hypertrophy and   severely decreased systolic function.  · The estimated ejection fraction is 10%.  · Grade II left ventricular diastolic dysfunction.  · Moderate left atrial enlargement.  · Moderate mitral regurgitation.  · No obvious endocarditis  · Normal right ventricular size with low normal right ventricular systolic   function.  · Mild to moderate tricuspid regurgitation.  · Mild aortic regurgitation.  · There are segmental left ventricular wall motion abnormalities. Akinetic   apex. No thrombus.          Radiology:  X-Ray Chest 1 View    Result Date: 9/1/2022  EXAMINATION: XR CHEST 1 VIEW CLINICAL HISTORY: CHF; TECHNIQUE: Single frontal view of the chest was performed. COMPARISON: Chest radiograph 08/26/2022 FINDINGS: Pacing device is noted in the left chest, with transvenous leads remaining in stable position. Unchanged enlargement of the cardiomediastinal silhouette.  Lungs appear symmetrically expanded, with slight improvement of interstitial and parenchymal attenuation, as well as patchy bibasilar opacities. Osseous structures appear intact.     Slight improvement of aeration of the lungs bilaterally Electronically signed by: Asael Ortiz Date:    09/01/2022 Time:    11:27    CTA Chest Non Coronary    Result Date: 8/3/2022  EXAMINATION: CTA CHEST NON CORONARY CLINICAL HISTORY: Pulmonary embolism (PE) suspected, positive D-dimer;SOB, elevated d dimer; TECHNIQUE: Low dose axial images, sagittal and coronal reformations were obtained from the thoracic inlet to the lung bases following the IV administration of 75 mL of Omnipaque 350.  Contrast timing was optimized to evaluate the pulmonary arteries.  Maximum intensity projection images were provided for review. COMPARISON: Chest radiograph 08/01/2022.  CT abdomen pelvis 08/01/2022.  Nuclear medicine lung ventilation scan 05/18/2022. FINDINGS:  Pulmonary vasculature: Satisfactory opacification of the pulmonary arterial system with no filling defect to the segmental level. Aorta: Left-sided aortic arch.  No aneurysm.  Atherosclerosis of the aortic arch, coronary arteries, and descending aorta. Base of Neck: No significant abnormality. Thoracic soft tissues: Left-sided cardiac pacer device with cardiac leads are noted. Heart: Mild-moderate enlargement of the left atrium..  No pericardial effusion.  There is reflux of contrast into the hepatic veins consistent with right heart failure versus tricuspid valvular disease. Nafisa/Mediastinum: No pathologic adam enlargement. Airways: The large airways are patent. No foci of endobronchial filling. Lungs/Pleura: Scattered ground-glass and consolidative opacities in the bilateral lower lobes, right greater than left.  Mild, smooth interlobular septal thickening.  Small-moderate right and trace-small left pleural effusion.  Dependent atelectasis in the bilateral lungs. Esophagus: Unremarkable Upper Abdomen: No abnormality of the partially imaged upper abdomen. Bones: No acute fracture. No suspicious lytic or sclerotic lesions.     No evidence of pulmonary thromboembolism to the subsegmental branches. Pulmonary edema with small right greater than left pleural effusions. Findings consistent with right heart failure and/or tricuspid valvular disease. Electronically signed by resident: Marbella Monaco Date:    08/03/2022 Time:    10:56 Electronically signed by: Nikki Valencia Date:    08/03/2022 Time:    11:42    CT Abdomen Pelvis With Contrast    Result Date: 8/18/2022  EXAMINATION: CT ABDOMEN PELVIS WITH CONTRAST CLINICAL HISTORY: Sepsis;Abdominal pain, acute, nonlocalized; TECHNIQUE: Low dose axial images, sagittal and coronal reformations were obtained from the lung bases to the pubic symphysis following the IV administration of 75 mL of Omnipaque 350 .  Oral contrast was not given. COMPARISON: 08/01/2022 FINDINGS:  Images of the lower thorax are remarkable for bilateral dependent atelectasis.  There is bilateral interlobular septal thickening and fibrotic change primarily involving the periphery of the bilateral lower lobes.  There is a calcified granuloma within the right lower lobe.  There are a few scattered tree-in-bud type nodules within the periphery of the right lower lobe, possibly new since the previous exam although pleural effusion on previous exam limits direct comparison.  There is a trace right pleural effusion, no significant left pleural effusion pacer wires noted. The liver is hypoattenuating, may reflect contrast phase however correlation with LFTs is advised as findings can be seen with steatosis.  The spleen, pancreas and right adrenal gland are unremarkable.  There is nodular thickening of the left adrenal gland.  The gallbladder is surgically absent.  No significant biliary dilation status post cholecystectomy.  There is a small hiatal hernia.  The stomach is nondistended, no significant gastric wall thickening.  The portal vein, splenic vein, SMV, celiac axis and SMA all are patent.  No significant abdominal lymphadenopathy. The kidneys enhance symmetrically without hydronephrosis or nephrolithiasis.  There are subcentimeter low attenuating lesions within the interpolar region of the left kidney, too small for characterization.  The bilateral ureters are unable to be followed in their entirety to the urinary bladder, no definite calculi seen or secondary findings to suggest obstructive uropathy.  The urinary bladder is relatively decompressed.  Motion artifact limits evaluation of the deep pelvis.  There is indistinctness about the uterus.  There is a small amount of fluid in the pelvis. Surgical changes are noted of distal colectomy.  The rectal stump appears intact.  There is moderate to large amount of stool within the residual large bowel.  The appendix and terminal ileum are unremarkable.  The  ostomy site within the left anterior abdominal wall appears intact.  The small bowel is grossly unremarkable.  No focal organized pelvic fluid collection.  There is prominent atherosclerotic plaque and calcification of the aorta and its branches. Degenerative changes are noted of the bilateral sacroiliac joints, femoroacetabular joints, and spine.  There is osteopenia.  No significant inguinal lymphadenopathy. Surgical changes are noted of the anterior abdominal wall.  There is rim enhancing fluid within the anterior abdominal wall incision, just cranial to the umbilicus and appears new since the previous examination.  The collection is difficult to measure given its lobular nature however measures approximately 2.2 x 1.1 x 1.4 cm.  Caudal to the umbilicus, an additional fluid collection is identified, appears more organized than on the previous examination and measures approximately 2.4 x 1.2 x 3.2 cm.  Caudal to this collection, an additional collection is noted, possibly contiguous with the above measuring approximately 1.1 x 1.1 cm peer there is heterogeneous air at the level of the umbilicus, likely reflecting air related to packing material.  Please note, the transverse colon abuts this region however no convincing fistula, correlation however is advised.  There are scattered right gluteal calcifications suggesting injection granuloma.  In comparison to the previous examination, there has been possible interval incision and drainage involving the posterior body wall posterior to the sacrum.  Induration remains in the region without focal organized collection.     This report was flagged in Epic as abnormal. 1. Rim enhancing collections along the anterior abdominal incision, collections cranial to the umbilicus appear new since the previous exam.  Collections inferior to the umbilicus please note, the transverse colon closely abuts these regions, no direct fistulous communication is identified although  correlation is recommended. 2. Moderate stool within the residual colon upstream of the colostomy.  No findings to suggest high-grade obstruction however correlation with ostomy output advised. 3. Possible hepatic steatosis, correlation with LFTs recommended. 4. Improved pleural effusions bilaterally. 5. Scattered tree-in-bud type nodules within the right lower lobe, developing infectious or inflammatory process not excluded.  Follow-up is advised. 6. Please see above for several additional findings. Electronically signed by: Bret Kamara MD Date:    08/18/2022 Time:    12:26    X-Ray Chest AP Portable    Result Date: 8/26/2022  EXAMINATION: XR CHEST AP PORTABLE CLINICAL HISTORY: Shortness of breath TECHNIQUE: Single frontal view of the chest was performed. COMPARISON: 08/01/2021 FINDINGS: Stably positioned left-sided cardiac pacing device in place.  There is unchanged enlargement of the cardiomediastinal silhouette.  Mediastinal structures are midline.  The lungs are symmetrically expanded with diffuse increased interstitial and parenchymal attenuation and a few patchy bibasilar opacities.  Findings can be seen in the setting of pulmonary edema/CHF although correlation for infectious process advised.  No substantial volume of pleural fluid or pneumothorax identified.  Osseous structures are intact.     Cardiomegaly and findings suggestive of pulmonary edema/CHF although correlation for infectious process advised. Electronically signed by: Lucy Tovar MD Date:    08/26/2022 Time:    03:21    Echo    Result Date: 8/4/2022  · The left ventricle is moderately enlarged with eccentric hypertrophy and severely decreased systolic function. · The estimated ejection fraction is 10%. · Grade II left ventricular diastolic dysfunction. · Moderate left atrial enlargement. · Moderate mitral regurgitation. · No obvious endocarditis · Normal right ventricular size with low normal right ventricular systolic function. · Mild to  moderate tricuspid regurgitation. · Mild aortic regurgitation. · There are segmental left ventricular wall motion abnormalities. Akinetic apex. No thrombus.          Current Medications:     Infusions:       Scheduled:   aspirin  81 mg Oral Daily    clopidogreL  75 mg Oral Daily    docusate sodium  100 mg Oral BID    enoxaparin  40 mg Subcutaneous Daily    ezetimibe  10 mg Oral QHS    furosemide  40 mg Oral Daily    midodrine  5 mg Oral BID WM    pantoprazole  40 mg Oral Daily    tolvaptan  30 mg Oral Every Mon, Wed, Fri        PRN:  ALPRAZolam, ondansetron, polyethylene glycol, simethicone, sodium chloride 0.9%, traMADoL     Assessment and Plan:     Odette Hart is a 66 y.o.female with  Coronary Artery Disease, stable  Continue aspirin 81 mg and clopidogrel 75 mg daily    Heart Failure, Systolic & Diastolic, Acute on Chronic  Continue furosemide 40mg qd.  BP is too low for ARB or BB at present  Continue with midodrine for BP.      HLP  On zetia    ICD in place         Tony Wade MD        Disclaimer: This document was created using voice recognition software ([employer] Fluency Direct). Although it may be edited, this document may contain errors related to incorrect recognition of the spoken word. Please call the physician if clarification is needed.

## 2022-09-06 NOTE — PLAN OF CARE
POC reviewed. No significant events this shift. Cardiac monitor maintained. Stable on RA. Complaints of pain treated with PRN pain medication per MAR. Wound care performed by nurse. Pt voids and shifts in bed with SBA. Bed low and locked, side rails up x3, call light within reach.    Problem: Adult Inpatient Plan of Care  Goal: Plan of Care Review  Outcome: Ongoing, Progressing  Goal: Patient-Specific Goal (Individualized)  Outcome: Ongoing, Progressing  Goal: Absence of Hospital-Acquired Illness or Injury  Outcome: Ongoing, Progressing  Goal: Optimal Comfort and Wellbeing  Outcome: Ongoing, Progressing  Goal: Readiness for Transition of Care  Outcome: Ongoing, Progressing     Problem: Impaired Wound Healing  Goal: Optimal Wound Healing  Outcome: Ongoing, Progressing     Problem: Skin Injury Risk Increased  Goal: Skin Health and Integrity  Outcome: Ongoing, Progressing     Problem: Fall Injury Risk  Goal: Absence of Fall and Fall-Related Injury  Outcome: Ongoing, Progressing     Problem: Coping Ineffective  Goal: Effective Coping  Outcome: Ongoing, Progressing

## 2022-09-06 NOTE — PT/OT/SLP PROGRESS
Occupational Therapy   Treatment    Name: Odette Hart  MRN: [id number]  Admitting Diagnosis:  Acute combined systolic and diastolic congestive heart failure       Recommendations:     Discharge Recommendations: home health PT, home health OT (friend assist, home health aides)  Discharge Equipment Recommendations:  bedside commode  Barriers to discharge:       Assessment:     Odette Hart is a 66 y.o. female with a medical diagnosis of Acute combined systolic and diastolic congestive heart failure.  She presents with weakness, impaired endurance, impaired self care skills, impaired functional mobility, decreased upper extremity function, impaired cardiopulmonary response to activity.     Rehab Prognosis:  Good; patient would benefit from acute skilled OT services to address these deficits and reach maximum level of function.       Plan:     Patient to be seen 4 x/week to address the above listed problems via self-care/home management, therapeutic exercises, therapeutic activities  Plan of Care Expires: 09/25/22  Plan of Care Reviewed with: patient    Subjective     Pain/Comfort:  Pain Rating 1: 0/10    Objective:     Communicated with: RN prior to session.  Patient found up in chair with colostomy, telemetry, peripheral IV upon OT entry to room.    General Precautions: Standard, fall, respiratory   Orthopedic Precautions:N/A   Braces: N/A  Respiratory Status: Room air     Occupational Performance:     Functional Mobility/Transfers:  Sit <> stand: CGA with RW  Functional Mobility: Pt required CGA and RW for functional ambulation approx 4 minutes, limited by LI. SpO2 98% throughout on RA. Pt would benefit from further endurance retraining.     Activities of Daily Living:  LBD: Mod A to don socks. Pt with difficulty assuming figure four position. Pt educated on figure four stretch to improve comfort and independence with LBD.   Grooming: CGA in standing for hair care, oral care and hand hygiene.       [facility]  6 Click ADL: 18    Treatment & Education:  OT role, plan of care, progression of goals, importance of continued OOB activity, endurance retraining, ADL/functional mobility/transfer retraining     Patient left up in chair with all lines intact, call button in reach, and RN notified    GOALS:   Multidisciplinary Problems       Occupational Therapy Goals          Problem: Occupational Therapy    Goal Priority Disciplines Outcome Interventions   Occupational Therapy Goal     OT, PT/OT Ongoing, Progressing    Description: Goals to be met by: 9/3/22    Patient will increase functional independence with ADLs by performing:    LB dressing at SBA.  BSC/toilet transfer at mod I  Toileting at Independent level  UBD at Independent level  Grooming in standing at Independent level     MET GOALS:   BSC/toilet transfers at SBA. MET 8/31/2022   ToIleting at SBA.  MET 8/31/2022   UB dressing at SBA.  MET 8/31/2022   Grooming sitting EOB at SBA.  MET 8/31/2022 in standing                         Time Tracking:     OT Date of Treatment: 09/06/22  OT Start Time: 0906  OT Stop Time: 0939  OT Total Time (min): 33 min    Billable Minutes:Self Care/Home Management 20 minutes  Therapeutic Activity 13 minutes    OT/FROILAN: OT          9/6/2022

## 2022-09-06 NOTE — PLAN OF CARE
Miguelina MENDIETA reports [facility] auth approved - will forward to accepting facility once determined

## 2022-09-06 NOTE — PT/OT/SLP PROGRESS
Physical Therapy      Patient Name:  Odette Hart   MRN:  [id number]    Patient not seen today secondary to Nurse/ SIMRAN hold, Other (Comment) (1st Attempt: pt requested PT to return after she finish eating lunch. (12:12pm) 2nd Attempt: Nurse hold d/t to low BP 87/37. (4:22pm)). Will follow-up 9/7/22.

## 2022-09-06 NOTE — PLAN OF CARE
"Met with patient at bedside to discuss discharge plan - patient agreeable to SNF but reports "i'm scared of nursing homes & would prefer [facility]" - informed patient i'd put [facility] as first choice but need alternatives in the event bed not available - patient agreed to send more referrals but will research accepting facilities before agreeing to transfer there - choice form signed - referrals forwarded via [facility]   "

## 2022-09-06 NOTE — PROGRESS NOTES
[facility] Nephrology Progress Note    Date of Admit: 2022    Chief Complaint/Reason for Consult     Shortness of Breath (Pt c/o difficulty breathing with associated weakness and nausea.  Pmh of CHF)    Reason for consult: Hyponatremia    Subjective:      History of Present Illness:  Odette Hart is a 66 y.o. female who  presented to [facility] on 22 with worsening shortness of breath. Has history of CHFrEF and has had multiple hospitalizations for dyspnea. BNP > 3k on presentation and pulmonary edema noted on CXR. EF of 10% on last Echo. Patient diuresed with IV Lasix but noted to have worsening hyponatremia prompting nephrology eval.    Interval history:       Seen walking halls with PT.  Got LI.  Still with edema and volume overload despite 16L UOP.  Discussed with Dr. Cordova in detail.  No CP.     Review of Systems:  Pertinent positives and negatives are listed in HPI.  All other systems are reviewed and are negative.     Objective:   Last 24 Hour Vital Signs:  BP  Min: 81/47  Max: 115/53  Temp  Av.7 °F (36.5 °C)  Min: 96.7 °F (35.9 °C)  Max: 98.2 °F (36.8 °C)  Pulse  Av.4  Min: 70  Max: 105  Resp  Av.4  Min: 16  Max: 20  SpO2  Av.7 %  Min: 94 %  Max: 98 %  Body mass index is 20.97 kg/m².  I/O last 3 completed shifts:  In: 400 [P.O.:400]  Out: 1200 [Urine:1200]      Physical Examination:  Gen: NAD, AAOx3  HEENT: NCAT, EOMI, PERRL, MM, pink conjunctiva  Neck: no thyroidmegaly, no LAD  Cardio: RRR, normal S1/S2, no MRG, JVP wnl  Lungs:  diminished.    Abd: soft non tender, non distended, normal bowel sounds, no hepatosplenomegaly, +colostomy.   Ext: 2+ pulses B/L, no clubbing, cyanosis, 1+ feet edema  Skin: warm, dry, no rashes noted  Neuro: CN 2-12 grossly intact, UE/LE motor 5/5, sensation intact  Psych: conversational, responsive      Laboratory:  Most Recent Data:    Recent Labs   Lab 22  0624   *   K 4.1   CL 89*   CO2 30*   BUN 12   CREATININE 0.7    CALCIUM 8.1*   PHOS 3.8             Radiology:  CT Head Without Contrast   Final Result      No acute intracranial abnormality.         Electronically signed by: Vicente Nichols   Date:    09/03/2022   Time:    16:06      X-Ray Chest 1 View   Final Result      Slight improvement of aeration of the lungs bilaterally         Electronically signed by: Asael Ortiz   Date:    09/01/2022   Time:    11:27      X-Ray Chest AP Portable   Final Result      Cardiomegaly and findings suggestive of pulmonary edema/CHF although correlation for infectious process advised.         Electronically signed by: Lucy Tovar MD   Date:    08/26/2022   Time:    03:21           Assessment and Plan:      Odette Hart is a 66 y.o.female    Hyponatremia-hypervolemic  - does have history of chronic hyponatremia with baseline around 129  - On 8/27 noted to have worsening level to 125. Held Lasix and repeated NA level and was up to 131 but suspect labs error and was stagnant at 127  -failed trial of lasix/danielito  -Na improving on Samsca 3 times a week, hold danielito, but continue lasix.    -Will need to get insurance approval of samsca for outpatient use prior to discharge.        Acute on chronic systolic heart failure  - severely reduced EF of 10% earlier this month  - Cardio following  - BP likely too low to tolerate ACE/ARB but defer to Cards  - see below.    Hypotension  -defer to cards.  -back on midodrine by primary team  -place teds    Normocytic Anemia:  -RIGO noted.  -dosed venofer.    End stage HF as above:  -appropriate for home based palliative care and pt now open to it.  -consult placed.

## 2022-09-06 NOTE — PLAN OF CARE
Problem: Occupational Therapy  Goal: Occupational Therapy Goal  Description: Goals to be met by: 9/3/22    Patient will increase functional independence with ADLs by performing:    LB dressing at SBA.  BSC/toilet transfer at mod I  Toileting at Independent level  UBD at Independent level  Grooming in standing at Independent level     MET GOALS:   BSC/toilet transfers at SBA. MET 8/31/2022   ToIleting at SBA.  MET 8/31/2022   UB dressing at SBA.  MET 8/31/2022   Grooming sitting EOB at SBA.  MET 8/31/2022 in standing    Outcome: Ongoing, Progressing    OT goals updated this date. Pt to continue POC. Progressing well. CGA for functional ambulation and grooming at the sink. Mod A for LBD.     DC: ROCÍO OT/PT

## 2022-09-06 NOTE — PLAN OF CARE
LOCET completed - PASSR faxed to obtain [phone] - Miguelina PHN reports request for SNF auth in medical review

## 2022-09-06 NOTE — PLAN OF CARE
Request forwarded to N to review for SNF auth - LVM for Miguelina - order placed for PPD - awaiting return call to complete LOCET

## 2022-09-06 NOTE — RESEARCH
Sponsor: Dr. Asael Culver M.D.    Study Title/IRB Number: A computer vision approach to prevention of injury from falling  IRB #: 2020.256    Principle Investigator: Asael Culver M.D.    Present for Discussion: Yes/Patient only     Is LAR Consenting for Subject: No    Prior to the Informed Consent (IC) being signed, or any study protocol required data collection, testing, procedure, or intervention being performed, the following was done and/or discussed:  Patient was given a copy of the IC for review   Purpose of the study and qualifications to participate   Study design, follow up schedule, and tests or procedures done at each visit  Confidentiality and HIPAA Authorization for Release of Medical Records for the research trial/ subject's rights/research related injury  Risk, Benefits, Alternative Treatments, Compensation and Costs  Participation in the research trial is voluntary and patient may withdraw at anytime  Contact information for study related questions    Patient verbalizes understanding of the above: Yes  Contact information for CRC and PI given to patient: Yes  Patient able to adequately summarize: the purpose of the study, the risks associated with the study, and all procedures, testing, and follow-ups associated with the study: Yes    Patient signed the informed consent form for the A Computer Vision Approach to Prevent Injury From Falling research study with an IRB approval date of 07/09/2020.  Each page of the consent form was reviewed with patient and all questions answered satisfactorily. Patient signed the consent form and received a copy of same. The original consent was scanned into electronic medical records (EPIC) and filed into the subject's research study chart.    Ms. Elias was able to complete the following upon entry of the study:    - Subject was outfitted with white and black checkered gown: Yes  - Subject understands that they must wear the white and black checkered  gown for the entirety of the 24 hour observation period: Yes   - Subject understands that visitors and staff will also be recorded while in the view of visual recording equipment: Yes  - Subject was able to read consent and understands that they'll only be visually recorded for 24 hours and not audio recorded: Yes  -Subject states that they understand that this is an investigational study and that the WOW (Workstation on Wheels) doesn't prevent or lower risks of falls or injuries, and that they should always follow their provider's orders and use their call bell to alert hospital staff before ambulating or becoming mobile: Yes    Dr. Asael Culver M.D. has reviewed the following inclusion/ exclusion criteria and confirms that subject meets all Inclusion and no Exclusion criteria at this time:      Inclusion-   - Subject is currently admitted as an inpatient with acute care needs to [facility] and is at risk for falling.  - Subject is awake, alert, oriented and can speak and understand English without        difficulty.  - Subject can stand and ambulate with or without assistance.  - Subject must be literate.  - Subject must have a BMI that allows for proper fit of white and black      checkered gown.  - Subject must be able to provide informed consent.  - Subject will be admitted for > 24 hours.    Exclusion-  - Subject cannot read.  - Subject is immobile e.g. (paralyzed)  - Subject has BMI that prevents them from properly fitting in white and black checkered      gown.  - Subject has COVID-19 or other airborne infectious diseases.  - Subject is on reverse-isolation for immunosuppressive diseases.  - Subject has altered mental status or diagnosis of dementia.  - Subject is expected to be discharged in < 24 hours.    Pt AAO x 4, sitting up in chair at bedside. Respirations even and unlabored without distress noted or complaints voiced. Pt is calm with a pleasant affect. Patient  safely placed in gown without incident. Pt reminded that WOW and video recording is not monitored and provides no emergency benefits or reduction or elimination from the risk of injury from falls; pt voiced understanding. Pt reminded to follow plan of care put forth by provider and to call RN for all assistance to reduce risk of problematic situations and potential injuries; pt voiced understanding. Recording started without incident. Advised pt that their participation is voluntary and if for any reasons they decide to cease the study that they only needed to inform their nurse and study session would be stopped; pt voiced understanding. Call bell within reach. Charge nurse notified of continuous video monitoring and of other study initiatives.

## 2022-09-06 NOTE — PROGRESS NOTES
MALKAW met with patient and patient has no additional needs. Patient will discharge to SNF once accepted.     RADHA Madrigal
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
## 2022-09-06 NOTE — PLAN OF CARE
POC reviewed with patient. AAOx4. VSS except bp running in 80/40s, asymptomatic. Sat patient up, bp pamela to wnl, stable on room air. Pt c/o pain at b hips, repositioned, rom exercises, and warm compress applied, mild to moderate relief. Pt transferred to [facility], voided per self. Multiple wound with clean, intact, dry dressings. No injuries, falls, or trauma occurred during shift. Purposeful rounding completed. Bed low and locked with side rails up x 3 and call light within reach. Will continue to monitor.
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
## 2022-09-07 NOTE — PROGRESS NOTES
"[facility] Medicine  Progress Note    Patient Name: Odette Hart  MRN: [id number]  Patient Class: IP- Inpatient   Admission Date: 8/26/2022  Length of Stay: 11 days  Attending Physician: Nghia Hunt MD  Primary Care Provider: Braxton Barragan MD        Subjective:     Principal Problem:Acute combined systolic and diastolic congestive heart failure        HPI:  Per Michael Long, NP:  "The patient is a 66 y.o. female with a past medical history of MI, HLD, CAD, cardiomyopathy, HTN, and CHF who presents for evaluation of shortness of breath onset earlier today. She reports "I've been feeling bad," endorsing associated symptoms of nausea and weakness. She states she has been near vomiting but denies any vomiting episodes. She reports elevated temperature at 99 F last night. She states her current presentations feel similar to her previous hospitalizations for dyspnea. Pt is regularly followed by cardiology. She denies pain elsewhere and other somatic complaints.  BNP elevated to greater than 3000 with pulmonary edema noted on CXR.  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  Started on Lasix for diuresis, but course complicated with hypotension and hyponatremia.  Carvedilol discontinued on 9/3/2022 by Cardiology service and midodrine added back on 9/4/2022.  SBP a little improved today.  Urine output recorded only as 450ml overnight.  Patient with " chronic hyponatremia with recent baseline of 129 and likely Cardiorenal in origin.  Received Tolvaptan x 2 and now increased to TIW.  Followed by Nephrology.  Na stable at 128 today. Patient open to skilled nursing placement.  Working on placement.      Interval History: No acute events. Shortness of breath improved.    Review of Systems   Constitutional:  Positive for fatigue. Negative for chills and fever.   Respiratory:  Positive for shortness of breath.    Cardiovascular:  Negative for chest pain.   Gastrointestinal:  Negative for abdominal pain, nausea and vomiting.   Genitourinary:  Negative for dysuria and frequency.   Objective:     Vital Signs (Most Recent):  Temp: 97.5 °F (36.4 °C) (09/06/22 1935)  Pulse: 85 (09/06/22 2000)  Resp: 19 (09/06/22 2035)  BP: (!) 95/52 (09/06/22 1943)  SpO2: 97 % (09/06/22 1943)   Vital Signs (24h Range):  Temp:  [97.4 °F (36.3 °C)-98.1 °F (36.7 °C)] 97.5 °F (36.4 °C)  Pulse:  [] 85  Resp:  [16-19] 19  SpO2:  [94 %-100 %] 97 %  BP: ()/(37-56) 95/52     Weight: 53.7 kg (118 lb 6.2 oz)  Body mass index is 20.97 kg/m².    Intake/Output Summary (Last 24 hours) at 9/6/2022 2143  Last data filed at 9/6/2022 2037  Gross per 24 hour   Intake --   Output 1950 ml   Net -1950 ml      Physical Exam  Constitutional:       General: She is not in acute distress.  HENT:      Head: Atraumatic.   Eyes:      Conjunctiva/sclera: Conjunctivae normal.   Cardiovascular:      Rate and Rhythm: Normal rate and regular rhythm.      Heart sounds: Murmur heard.   Pulmonary:      Effort: Pulmonary effort is normal.      Breath sounds: Normal breath sounds. No wheezing.   Abdominal:      General: Bowel sounds are normal. There is no distension.      Palpations: Abdomen is soft.      Tenderness: There is no abdominal tenderness.      Comments: Colostomy with stool in bag.   Musculoskeletal:         General: No deformity. Normal range of motion.      Cervical back: Neck supple.   Neurological:       Mental Status: She is alert and oriented to person, place, and time.       Significant Labs: All pertinent labs within the past 24 hours have been reviewed.    Significant Imaging: I have reviewed all pertinent imaging results/findings within the past 24 hours.        Assessment/Plan:      * Acute combined systolic and diastolic congestive heart failure  Spoke with cardiologist who reports history of noncompliance.  May improve with adherent to medical therapy.  Continue medical therapy.      Encounter for palliative care  Followed by Palliative Care  Patient is Full Code      Debility  Patient with fall on 9/3/2022 while using walking.  Hit the back of her head.  CTH negative.   PT/OT consulted -  PT/OT on discharge.          Hyponatremia  Chronic with recent baseline of 129 and likely from Cardiorenal in origin.  Received Tolvaptan x 2 and now starting biw.  Followed by Nephrology.  Na stable at 128 today from 130 yesterday    Plan:  Follow up with Nephrology recommendations - Tolvaptan increased to 30mg three times weekly  Follow BMP           Primary hypertension  Home Meds:  Carvedilol 3.125mg bid and Lasix 40mg  SBP runs low outpatient.  SBP a little better today after addition of Midodrine.  -See above  -Continue Lasix and Midodrine as above  -Monitor and adjust as needed      Familial hypercholesterolemia  Has previous Statin intolerance.  -Continue Zetia      Coronary artery disease of native artery of native heart with stable angina pectoris  Continue medical therapy as tolerated.        VTE Risk Mitigation (From admission, onward)         Ordered     Place JESS hose  Until discontinued         09/02/22 0910     enoxaparin injection 40 mg  Daily         08/26/22 0325     IP VTE HIGH RISK PATIENT  Once         08/26/22 0325     Place sequential compression device  Until discontinued         08/26/22 0325                Nghia Hunt MD  Department of Hospital Medicine   [facility] ([place])

## 2022-09-07 NOTE — PHYSICIAN QUERY
PT Name: Odette Hart  MR #: [id number]    DOCUMENTATION CLARIFICATION     CDS/: Angel Varela Jr, RN CCDS              Contact information: [email or web address]    This form is a permanent document in the medical record.     Query Date: September 7, 2022    By submitting this query, we are merely seeking further clarification of documentation.. Please utilize your independent clinical judgment when addressing the question(s) below.    The medical record contains the following:   Indicators  Supporting Clinical Findings Location in Medical Record   x Energy Intake PO intake noted at 50% with reported decreased po intake and fair tolerance 8/31 Nutrition Progress Note    Weight Loss      Fat Loss      Muscle Loss     x Edema/Fluid Accumulation   Right lower leg: Normal. No swelling. No edema.      Left lower leg: Normal. No swelling. No edema.  8/26 Cardiology Consult    Reduced  Strength (by dynamometer)     x Weight, BMI, Usual Body Weight BMI (Calculated): 21  Weight: 53.7 kg (118 lb 6.2 oz)   8/31 Nutrition Progress Note   x Delayed Wound Healing Chronic Non healing midline abdominal wound , pink , clean and still undermines at 3 and 6 o'clock . Wound requires packing. Note clear suture present in wound base.    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   Base is clean red, and smaller than last seen pt aware of protein malnutrition and offloads and repositions in bed when appropriate    Nutrition remains a concern with poor wound healing   8/26 Wound Care Consult Note        8/26 Wound Care Consult Note            8/26 Wound Care Consult Note   x Registered Dietician Diagnosis Severe protein-calorie malnutrition      Patient with previous medical history of malnutrition related to muscle and fat wasting along with decreased po intake 8/27 Nutrition Consult  (Patient Active Problem list)    8/27 Nutrition Consult   x Acute or Chronic Illness  Debility  - PT/OT evaluated patient,  recommended 24/7 assist at home     Acute combined systolic and diastolic congestive heart failure 8/30 Palliative Care Consult         9/6 Hospital Medicine Progress Note    Social or Environmental Circumstances     x Treatment   Patient with colostomy and reduced bowel sounds.  Sacral wound noted with yoana ordered BID to promote wound healing   8/31 Nutrition Progress Note   x Other Constitutional:  Positive for activity change, appetite change and fatigue     Patient's weight history shows weight gain from 52.6kg on 7/22 to current weight of 53.7kg 8/26 H&P      8/27 Nutrition Consult Note     Academy of Nutrition and Dietetics (Academy) and the American Society for Parenteral and Enteral Nutrition (A.S.P.E.N.) Clinical Characteristics to support Malnutrition   Malnutrition in the Context of Acute Illness or Injury Malnutrition in the Context of Chronic Illness or Injury Malnutrition in the Context of Social or Environmental Circumstances   Malnutrition Level Moderate Severe Moderate Severe   Moderate   Severe   Energy Intake <75%                   >7 days <50%                 >5 days <75%           >1 month <75%                      >1 month   <75% for >3 months   <50% for >1 month   Weight Loss   1-2% in 1 week >2% in 1 week 5% in 1 month >5% in 1 month 5% in 1 month >5% in 1 month    5% in 1 month >5% in 1 month 7.5% in 3 months >7.5% in 3 months 7.5% in 3 months >7.5% in 3 months    7.5% in 3 months >7.5% in 3 months 10% in 6 months >10% in 6 months 10% in 6 months >10% in 6 months        20% in 1 year                    >20% in 1 year                                                                  20% in 1 year                            >20% in 1 year                                                  Subcutaneous Fat Loss Mild  Moderate  Mild  Severe    Mild   Severe   Muscle Loss Mild depletion Moderate depletion  Mild depletion Severe Depletion   Mild   Severe   Edema/Fluid Accumulation Mild Moderate to  severe  Mild  Severe   Mild   Severe   Reduced  Strength         (based on standards supplied by  of dynamometer) N/A Measurably reduced N/A Measurably reduced N/A Measurably reduced     Criteria for mild malnutrition is defined as 1 characteristic outlined above within the established moderate or severe parameters.  A minimum of 2 out of the 6 characteristics noted above are recommended for a diagnosis of moderate or severe malnutrition.  Chronic illness/injury is a disease/condition lasting 3 months or longer.    The noted clinical guidelines are only system guidelines and do not replace the providers clinical judgment.    Provider, please specify diagnosis or diagnoses associated with above clinical findings.    [x  ] Mild to Moderate Malnutrition - 1 characteristic outlined above within the established moderate or severe parameters   [  ] Other Degree of Malnutrition (Please Specify)   [  ] Malnutrition Unspecified Degree   [  ] Other Nutritional Diagnosis (please specify):    [  ] Clinically Undetermined       Please document in your progress notes daily for the duration of treatment until resolved and  include in your discharge summary.      References:    DEVANTE Verma, & ARABELLA Kent (2022, April). Assessment and management of anorexia and cachexia in palliative care. Retrieved May 23, 2022, from https://www.[employer].Zoopla/contents/assessment-and-management-of-anorexia-and-cachexia-in-palliative-care?mxbbyBes=4531&source=see_link     GUANAKO Rocha, PhD, RD, Abrahan ROJO P., PhD, RN, POORNIMA Bob MD, PhD, Efren ZAMBRANO A., MS, RD, [facility], SRINI Capps, MS, RD, The Academy Malnutrition Work Group, The A.S.P.E.N. Board of Directors. (2012). Consensus Statement: Academy of Nutrition and Dietetics and American Society for Parenteral and Enteral Nutrition: Characteristics Recommended for the Identification and Documentation of Adult Malnutrition (Undernutrition). Journal of Parenteral and Enteral  Nutrition, 363, 275-025. doi:10.1177/9436990539526406     Form No. [id number]

## 2022-09-07 NOTE — PLAN OF CARE
"CM met with pt to discuss SNF, pt states she wants to go home rather than SNF, however, she has not ruled out SNF.    ALECIA spoke to Lo at [facility] to inquire about acceptance. Lo states her insurance shows she is on hospice and they are not able to get auth.    ALECIA spoke to pt, she states she has never been on hospice and is not currently on hospice.    ALECIA left a message for Magalis at [facility] to inquire.    ALECIA received a call fron Ruperto at [facility] stating they would like to accept pt. ALECIA spoke to pt, she is very reluctant to make a decision on either facillity. Ms Hart states she has to "talk to the people", stating she feels rushed.    Pt not medically ready for discharge today.  "

## 2022-09-07 NOTE — ASSESSMENT & PLAN NOTE
Spoke with cardiologist who reports history of noncompliance.  May improve with adherent to medical therapy.  Continue medical therapy.

## 2022-09-07 NOTE — SUBJECTIVE & OBJECTIVE
Interval History: No acute events. Shortness of breath improved.    Review of Systems   Constitutional:  Positive for fatigue. Negative for chills and fever.   Respiratory:  Positive for shortness of breath.    Cardiovascular:  Negative for chest pain.   Gastrointestinal:  Negative for abdominal pain, nausea and vomiting.   Genitourinary:  Negative for dysuria and frequency.   Objective:     Vital Signs (Most Recent):  Temp: 97.5 °F (36.4 °C) (09/06/22 1935)  Pulse: 85 (09/06/22 2000)  Resp: 19 (09/06/22 2035)  BP: (!) 95/52 (09/06/22 1943)  SpO2: 97 % (09/06/22 1943)   Vital Signs (24h Range):  Temp:  [97.4 °F (36.3 °C)-98.1 °F (36.7 °C)] 97.5 °F (36.4 °C)  Pulse:  [] 85  Resp:  [16-19] 19  SpO2:  [94 %-100 %] 97 %  BP: ()/(37-56) 95/52     Weight: 53.7 kg (118 lb 6.2 oz)  Body mass index is 20.97 kg/m².    Intake/Output Summary (Last 24 hours) at 9/6/2022 2143  Last data filed at 9/6/2022 2037  Gross per 24 hour   Intake --   Output 1950 ml   Net -1950 ml      Physical Exam  Constitutional:       General: She is not in acute distress.  HENT:      Head: Atraumatic.   Eyes:      Conjunctiva/sclera: Conjunctivae normal.   Cardiovascular:      Rate and Rhythm: Normal rate and regular rhythm.      Heart sounds: Murmur heard.   Pulmonary:      Effort: Pulmonary effort is normal.      Breath sounds: Normal breath sounds. No wheezing.   Abdominal:      General: Bowel sounds are normal. There is no distension.      Palpations: Abdomen is soft.      Tenderness: There is no abdominal tenderness.      Comments: Colostomy with stool in bag.   Musculoskeletal:         General: No deformity. Normal range of motion.      Cervical back: Neck supple.   Neurological:      Mental Status: She is alert and oriented to person, place, and time.       Significant Labs: All pertinent labs within the past 24 hours have been reviewed.    Significant Imaging: I have reviewed all pertinent imaging results/findings within the past  24 hours.

## 2022-09-07 NOTE — PHYSICIAN QUERY
PT Name: Odette Hart  MR #: [id number]     DOCUMENTATION CLARIFICATION     CDS/: Angel Varela Jr, RN CCDS              Contact information:[email or web address]  This form is a permanent document in the medical record.     Query Date: September 7, 2022    By submitting this query, we are merely seeking further clarification of documentation.  Please utilize your independent clinical judgment when addressing the question(s) below.    The Medical Record contains the following   Indicators Supporting Clinical Findings Location in Medical Record   x Heart Failure documented Principal Problem:Acute on chronic systolic heart failure    Principal Problem:Acute combined systolic and diastolic congestive heart failure   8/26 H&P      9/6 Hospital Progress Note   x BNP 3698-->3420   8/26-8/30 Labs   x EF/Echo Summary    ? The left ventricle is moderately enlarged with eccentric hypertrophy and severely decreased systolic function.  ? The estimated ejection fraction is 10%.  ? Grade II left ventricular diastolic dysfunction.  ? Moderate left atrial enlargement.  ? Moderate mitral regurgitation.  ? No obvious endocarditis  ? Normal right ventricular size with low normal right ventricular systolic function.  ? Mild to moderate tricuspid regurgitation.  ? Mild aortic regurgitation.  ? There are segmental left ventricular wall motion abnormalities. Akinetic apex. No thrombus.   8/4/2022 Echo   (Previous Encounter)   x Radiology findings BNP elevated to greater than 3000 with pulmonary edema noted on CXR   8/26 H&P   x Subjective/Objective Respiratory Conditions Chief Complaint:  Shortness of breath.     8/26 Cardiology Consult     Recent/Current MI      Heart Transplant, LVAD     x Edema, JVD Right lower leg: Normal. No swelling. No edema.      Left lower leg: Normal. No swelling. No edema.  8/26 Cardiology Consult     Ascites     x Diuretics/Meds furosemide injection 40 mg Every 12 Hours 8/26-8/27 MAR    Other  Treatment      Other       Heart failure is a clinical diagnosis which includes symptomatic fluid retention, elevated intracardiac pressures, and/or the inability of the heart to deliver adequate blood flow.    Heart Failure with reduced Ejection Fraction (HFrEF) or Systolic Heart Failure (loses ability to contract normally, EF is <40%)    Heart Failure with preserved Ejection Fraction (HFpEF) or Diastolic Heart Failure (stiff ventricles, does not relax properly, EF is >50%)     Heart Failure with Combined Systolic and Diastolic Failure (stiff ventricles, does not relax properly and EF is <50%)    Mid-range or mildly reduced ejection fraction (HFmrEF) is classified as systolic heart failure.  Congestive heart failure with a recovered EF is classified as Diastolic Heart Failure.  Common clues to acute exacerbation:  Rapidly progressive symptoms (w/in 2 weeks of presentation), using IV diuretics, using supplemental O2, pulmonary edema on Xray, new or worsening pleural effusion, +JVD or other signs of volume overload, MI w/in 4 weeks, and/or BNP >500  The clinical guidelines noted are only system guidelines, and do not replace the providers clinical judgment.    Provider, due to documentation conflict please clarify the    Type      and      Acuity   of      Heart Failure          [ x  ]  Acute on Chronic Systolic Heart Failure (HFrEF or HFmrEF) - worsening of CHF signs/symptoms in preexisting CHF   [   ]  Acute Combined Systolic and Diastolic Heart Failure - new diagnosis   [   ]  Acute on Chronic Combined Systolic and Diastolic Heart Failure - worsening of CHF signs/symptoms in preexisting CHF   [   ]  Other (please specify):    [  ]  Clinically Undetermined           Please document in your progress notes daily for the duration of treatment until resolved and include in your discharge summary.    References:  American Heart Association editorial staff. (2017, May). Ejection Fraction Heart Failure Measurement.  American Heart Association. https://www.heart.org/en/health-topics/heart-failure/diagnosing-heart-failure/ejection-fraction-heart-failure-measurement#:~:text=Ejection%20fraction%20(EF)%20is%20a,pushed%20out%20with%20each%20heartbeat  AMY Dominique (2020, December 15). Heart failure with preserved ejection fraction: Clinical manifestations and diagnosis. FLIP4NEW. https://www.[employer].Xtraice/contents/heart-failure-with-preserved-ejection-fraction-clinical-manifestations-and-diagnosis.  ICD-10-CM/PCS Coding Clinic Third Quarter ICD-10, Effective with discharges: September 8, 2020 [facility] § Heart failure with mid-range or mildly reduced ejection fraction (2020).  ICD-10-CM/PCS Coding Clinic Third Quarter ICD-10, Effective with discharges: September 8, 2020 [facility] § Heart failure with recovered ejection fraction (2020).  Form No. 46424

## 2022-09-07 NOTE — PLAN OF CARE
CM provided pt list of  SNF providers which have expressed willingness to accept pt, [facility] and [facility]. As of this time pt will not confirm her choice.    Pt continues to state she wants to go home, CM to follow for plans and arrangemetns.

## 2022-09-07 NOTE — SUBJECTIVE & OBJECTIVE
Interval History: No acute events. Shortness of breath improved.    Review of Systems   Constitutional:  Positive for fatigue. Negative for chills and fever.   Respiratory:  Positive for shortness of breath.    Cardiovascular:  Negative for chest pain.   Gastrointestinal:  Negative for abdominal pain, nausea and vomiting.   Genitourinary:  Negative for dysuria and frequency.     Objective:     Vital Signs (Most Recent):  Temp: 97.8 °F (36.6 °C) (09/07/22 1148)  Pulse: 104 (09/07/22 1200)  Resp: 18 (09/07/22 1148)  BP: (!) 95/55 (09/07/22 1148)  SpO2: (!) 93 % (09/07/22 1148)   Vital Signs (24h Range):  Temp:  [97.5 °F (36.4 °C)-99.7 °F (37.6 °C)] 97.8 °F (36.6 °C)  Pulse:  [] 104  Resp:  [16-19] 18  SpO2:  [93 %-97 %] 93 %  BP: (83-97)/(37-55) 95/55     Weight: 53.7 kg (118 lb 6.2 oz)  Body mass index is 20.97 kg/m².    Intake/Output Summary (Last 24 hours) at 9/7/2022 1233  Last data filed at 9/7/2022 0000  Gross per 24 hour   Intake --   Output 1900 ml   Net -1900 ml        Physical Exam  Constitutional:       General: She is not in acute distress.  HENT:      Head: Atraumatic.   Eyes:      Conjunctiva/sclera: Conjunctivae normal.   Cardiovascular:      Rate and Rhythm: Normal rate and regular rhythm.      Heart sounds: Murmur heard.   Pulmonary:      Effort: Pulmonary effort is normal.      Breath sounds: Normal breath sounds. No wheezing.   Abdominal:      General: Bowel sounds are normal. There is no distension.      Palpations: Abdomen is soft.      Tenderness: There is no abdominal tenderness.      Comments: Colostomy with stool in bag.   Musculoskeletal:         General: No deformity. Normal range of motion.      Cervical back: Neck supple.   Neurological:      Mental Status: She is alert and oriented to person, place, and time.       Significant Labs: All pertinent labs within the past 24 hours have been reviewed.    Significant Imaging: I have reviewed all pertinent imaging results/findings within  the past 24 hours.

## 2022-09-07 NOTE — ASSESSMENT & PLAN NOTE
Discussed with Dr. Yohannes Cordova (Cardiology).  Patient has difficulty tolerating goal-directed therapy for heart failure due to hypertension and also reports side effects to numerous medications in the past.  Patient counseled importance of adhering to medical therapy as much as possible to improve her chances of improving her cardiac function and decreasing her mortality.  Plan to hold midodrine today.  If blood pressure can tolerate will start low-dose of metoprolol tomorrow 12.5 mg twice daily.

## 2022-09-07 NOTE — PT/OT/SLP PROGRESS
Physical Therapy Treatment    Patient Name:  Odette Hart   MRN:  [id number]    Recommendations:     Discharge Recommendations:  home health OT, home health PT   Discharge Equipment Recommendations: other (see comments) (TBD pending patient progress)   Barriers to discharge: Decreased caregiver support and increased mob    Assessment:     Odette Hart is a 66 y.o. female admitted with a medical diagnosis of Acute combined systolic and diastolic congestive heart failure.  She presents with the following impairments/functional limitations:  weakness, impaired endurance, impaired self care skills, gait instability, impaired balance, pain, impaired cardiopulmonary response to activity . Upon arrival, pt was R sidelying in bed on room air. BP 80/46 in sitting position MAP 55, asymptomatic. Pt amb 70ft  and 50ft w/ standing rest break x2, increased fatigue.    Prognosis: Good; patient would benefit from acute skilled PT services to address these deficits and reach maximum level of function.    Recent Surgery: * No surgery found *      Plan:     During this hospitalization, patient to be seen 3 x/week to address the identified rehab impairments via gait training, therapeutic activities, therapeutic exercises, neuromuscular re-education and progress toward the following goals:    Plan of Care Expires:  09/28/22    Subjective     Chief Complaint: None stated  Patient/Family Comments/goals: None stated  Pain/Comfort:  Pain Rating 1: 0/10  Location - Side 1: Right  Location - Orientation 1: posterior  Location 1: sacral spine  Pain Addressed 1: Reposition      Objective:     Communicated with Nick MARTINEZ  prior to session.  Patient found right sidelying with colostomy, telemetry, peripheral IV upon PT entry to room.     General Precautions: Standard, fall   Orthopedic Precautions:N/A   Braces: N/A  Respiratory Status: Room air     Functional Mobility:  Gait: 70ft x50ft w/ RW standing rest break, slow velocity,  decreased step length  Balance: Fair+dynamic gait      AM-PAC 6 CLICK MOBILITY  Turning over in bed (including adjusting bedclothes, sheets and blankets)?: 4  Sitting down on and standing up from a chair with arms (e.g., wheelchair, bedside commode, etc.): 4  Moving from lying on back to sitting on the side of the bed?: 4  Moving to and from a bed to a chair (including a wheelchair)?: 3  Need to walk in hospital room?: 3  Climbing 3-5 steps with a railing?: 3  Basic Mobility Total Score: 21       Therapeutic Activities and Exercises:   Importance of mob, safety awareness, gait training for increase functional mob    Patient left right sidelying with all lines intact, call button in reach, and Case management present..    GOALS:   Multidisciplinary Problems       Physical Therapy Goals          Problem: Physical Therapy    Goal Priority Disciplines Outcome Goal Variances Interventions   Physical Therapy Goal     PT, PT/OT      Description: Goals to be met by: Discharge     Patient will increase functional independence with mobility by performin. Gait  x 300 feet with Modified [place] using LRAD, w/o LOB.                    Multidisciplinary Problems (Resolved)          Problem: Physical Therapy    Goal Priority Disciplines Outcome Goal Variances Interventions   Physical Therapy Goal   (Resolved)     PT, PT/OT Met     Description: Goals to be met by: 2022    Patient will increase functional independence with mobility by performin. Sit<>stand with supervision with least restrictive AD. - MET 22  2. Gait x 200 feet with SBA with least restrictive AD. - MET 22  3. Ascend/descend 4 step(s) with least restrictive assistive device and CGA. - MET 22                          Time Tracking:     PT Received On: 22  PT Start Time: 1515     PT Stop Time: 1546  PT Total Time (min): 31 min     Billable Minutes: Gait Training 31    Treatment Type: Treatment  PT/PTA: PT     PTA Visit Number:  0     09/07/2022

## 2022-09-07 NOTE — PT/OT/SLP PROGRESS
Occupational Therapy   Treatment    Name: Odette Hart  MRN: [id number]  Admitting Diagnosis:  Acute combined systolic and diastolic congestive heart failure       Recommendations:     Discharge Recommendations: home health PT, home health OT (friend assist, home health aids)  Discharge Equipment Recommendations:  bedside commode  Barriers to discharge:  Inaccessible home environment, Decreased caregiver support    Assessment:     Odette Hart is a 66 y.o. female with a medical diagnosis of Acute combined systolic and diastolic congestive heart failure.  She presents with weakness, impaired endurance, impaired self care skills, impaired functional mobility, impaired balance, decreased coordination, impaired cardiopulmonary response to activity.     Rehab Prognosis:  Good; patient would benefit from acute skilled OT services to address these deficits and reach maximum level of function.       Plan:     Patient to be seen 4 x/week to address the above listed problems via self-care/home management, therapeutic activities, therapeutic exercises  Plan of Care Expires: 09/25/22  Plan of Care Reviewed with: patient    Subjective     Pain/Comfort:  Pain Rating 1: 0/10    Objective:     Communicated with: RN prior to session.  Patient found up in chair with colostomy, telemetry, peripheral IV upon OT entry to room.    General Precautions: Standard, fall, respiratory   Orthopedic Precautions:N/A   Braces: N/A  Respiratory Status: Room air     Occupational Performance:     Bed Mobility:    Sit to supine: SBA     Functional Mobility/Transfers:  Sit <> stand: CGA  Functional Mobility: Pt required CGA and RW for functional ambulation in room. Unable to tolerate grooming in standing, pt requested to sit EOB for bathing.     Activities of Daily Living:  LBD: Max A to doff socks   Bathing: SBA and set up while seated EOB  Grooming: Mod A for hair care 2/2 shampoo cap  UBD: Min A to doff/don hospital gown       [facility] 6  Click ADL: 18    Treatment & Education:  OT role, plan of care, progression of goals, importance of continued OOB activity, fall prevention, safety precautions, call don't fall, ADL/functional transfer/mobility retraining, energy conservation/activity modification/work simplification    Patient left HOB elevated with all lines intact, call button in reach, and RN notified    GOALS:   Multidisciplinary Problems       Occupational Therapy Goals          Problem: Occupational Therapy    Goal Priority Disciplines Outcome Interventions   Occupational Therapy Goal     OT, PT/OT Ongoing, Progressing    Description: Goals to be met by: 9/3/22    Patient will increase functional independence with ADLs by performing:    LB dressing at SBA.  BSC/toilet transfer at mod I  Toileting at Independent level  UBD at Independent level  Grooming in standing at Independent level     MET GOALS:   BSC/toilet transfers at SBA. MET 8/31/2022   ToIleting at SBA.  MET 8/31/2022   UB dressing at SBA.  MET 8/31/2022   Grooming sitting EOB at SBA.  MET 8/31/2022 in standing                         Time Tracking:     OT Date of Treatment: 09/07/22  OT Start Time: 1131  OT Stop Time: 1208  OT Total Time (min): 37 min    Billable Minutes:Self Care/Home Management 37 minutes    OT/FROILAN: OT          9/7/2022

## 2022-09-07 NOTE — PLAN OF CARE
PHN auth obtained ; awaiting accepting facility  [facility] Phone: [phone]      * COVID-19: NOT able to accept COVID-19 positive patients,* COVID-19: Services not specified,* High-Risk Isolation Patients: Willing/Equipped to accept High-Risk Isolation Patients [address] [place] [address] 9/6/2022 9:24 (CT)  9/7/2022 9:47 (CT)  9/6/2022 11:17 (CT)  9/6/2022 11:17 (CT)  Response: Interested, but need more information  Comments: Will review with the DON [facility] Phone: [phone]    1 negative test within 72 hours prior to admission  * COVID-19: NOT able to accept COVID-19 positive patients [address] [place] [address] 9/6/2022 9:24 (CT)  9/6/2022 11:23 (CT)  9/6/2022 11:39 (CT)  9/6/2022 11:39 (CT)  Response: No, unable to accept patient  Reason: No Available Bed    [facility] Phone: [phone]    We are closed for Admission's until Thursday, October 28th, due to positive COVID cases in our building. Sincerely, Yaa Pink.  * COVID-19: NOT able to accept COVID-19 positive patients [address] [place] [address] 9/6/2022 9:24 (CT)  9/7/2022 9:47 (CT)  -  -  Awaiting referral review   [facility] Phone: [phone]    Only accepting our readmits back at this time - will update criteria once reopened for admissions  * Closed for New Admissions,* COVID-19: NOT able to accept COVID-19 positive patients,* COVID-19: Positive patients under care [address] [address] [place] [address] 9/6/2022 9:24 (CT)  9/7/2022 9:47 (CT)  -  -  - Awaiting referral review   [facility] Phone: [phone]    NEGATIVE TEST WITHIN 7 DAYS OF ADMIT  * COVID-19: NOT able to accept COVID-19 positive patients [address] [place] [address] 9/6/2022 9:24 (CT)  9/7/2022 9:47 (CT)  -  -  - Awaiting referral review

## 2022-09-07 NOTE — PHYSICIAN QUERY
PT Name: Odette Hart  MR #: [id number]     DOCUMENTATION CLARIFICATION     CDS/: Angel Varela Jr               Contact information:  Query withdrawn pending  input on stage 9/7 1130  This form is a permanent document in the medical record.     Query Date: September 7, 2022    By submitting this query, we are merely seeking further clarification of documentation.  Please utilize your independent clinical judgment when addressing the question(s) below.    The Medical Record contains the following:   Indicators   Supporting Clinical Findings Location in Medical Record    Non-blanchable erythema/redness     x Ulcer/Injury/Skin Breakdown 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 8/26 Wound Care Consult Note    Deep Tissue Injury     x Wound care consult Altered Skin Integrity 05/09/22 1630 Sacral spine Full thickness tissue loss. Base is covered by slough and/or eschar in the wound bed      Description of Altered Skin Integrity Full thickness tissue loss. Subcutaneous fat may be visible but bone, tendon or muscle are not exposed     8/26 Wound Care Consult Note    Acute/Chronic Illness      Medication/Treatment Complains of chronic pain associated with sacral wound when sitting. Will request a waffle cushion for chair seating.  8/26 Wound Care Consult Note    Other       The clinical guidelines noted are only a system guideline. It does not replace the providers clinical judgment.    Per the National Pressure Injury Advisory Panel:   A pressure injury is localized damage to the skin and underlying soft tissue usually over a bony prominence or related to a medical or other device. The injury can present as intact skin or an open ulcer and may be painful. The injury occurs as a result of intense and/or prolonged pressure or pressure in combination with shear. The tolerance of soft tissue for pressure and shear may also be affected by microclimate, nutrition, perfusion, co-morbidities and  condition of the soft tissue.       Stage 1 Pressure Injury:  Intact skin with a localized area of non-blanchable erythema, which may appear differently in darkly pigmented skin. Color changes do not include purple or maroon discoloration; these may indicate deep tissue pressure injury.    Stage 2 Pressure Injury:  Partial-thickness loss of skin with exposed dermis. The wound bed is viable, pink or red, moist, and may also present as an intact or ruptured serum-filled blister.    Stage 3 Pressure Injury:  Full-thickness loss of skin, in which adipose (fat) is visible in the ulcer and granulation tissue and epibole (rolled wound edges) are often present. Slough and/or eschar may be visible. Undermining and tunneling may occur.    Stage 4 Pressure Injury:  Full-thickness skin and tissue loss with exposed or directly palpable fascia, muscle, tendon, ligament, cartilage or bone in the ulcer. Slough and/or eschar may be visible. Epibole (rolled edges), undermining and/or tunneling often occur.    Unstageable Pressure Injury:  Full-thickness skin and tissue loss in which the extent of tissue damage within the ulcer cannot be confirmed because it is obscured by slough or eschar. If slough or eschar is removed, a Stage 3 or Stage 4 pressure injury will be revealed.    Deep Tissue Pressure Injury:  Intact or non-intact skin with localized area of persistent non-blanchable deep red, maroon, purple discoloration or epidermal separation revealing a dark wound bed or blood filled blister. This injury results from intense and/or prolonged pressure and shear forces at the bone-muscle interface. The wound may evolve rapidly to reveal the actual extent of tissue injury, or may resolve without tissue loss. If necrotic tissue, subcutaneous tissue, granulation tissue, fascia, muscle or other underlying structures are visible, this indicates a full thickness pressure injury (Unstageable, Stage 3 or Stage 4). Do not use DTPI to describe  vascular, traumatic, neuropathic, or dermatologic conditions.   Medical Device Related Pressure Injury: This describes an etiology. Medical device related pressure injuries result from the use of devices designed and applied for diagnostic or therapeutic purposes. The resultant pressure injury generally conforms to the pattern or shape of the device. The injury should be staged using the staging system.    Mucosal Membrane Pressure Injury: Mucosal membrane pressure injury is found on mucous membranes with a history of a medical device in use at the location of the injury. Due to the anatomy of the tissue these ulcers cannot be staged.       Provider, please provide the integumentary diagnosis related to the documentation of (CDI: IDENTIFY SITE):     [   ] Pressure Injury/Decubitus Ulcer, Stage 1   [   ] Pressure Injury/Decubitus Ulcer, Stage 2   [   ] Pressure Injury/Decubitus Ulcer, Stage 3   [   ] Pressure Injury/Decubitus Ulcer, Stage 4   [   ] Pressure Injury/Decubitus Ulcer, Unstageable   [   ] Pressure Injury/Decubitus Ulcer, Stage evolved from (please specify): _____ to _____ during hospital admission   [   ] Deep Tissue Pressure Injury   [   ] Deep Tissue Pressure Injury that evolved into a Pressure Injury/Decubitus Ulcer (please specify stage evolved to): _____   [   ] Medical Device Related Pressure Injury/Decubitus Ulcer, Stage 1   [   ] Medical Device Related Pressure Injury/Decubitus Ulcer, Stage 2   [   ] Medical Device Related Pressure Injury/Decubitus Ulcer, Stage 3   [   ] Medical Device Related Pressure Injury/Decubitus Ulcer, Stage 4   [   ] Medical Device Related Pressure Injury/Decubitus Ulcer, Unstageable   [   ] Medical Device Related Deep Tissue Pressure Injury   [   ] Mucosal Membrane Pressure Injury   [   ] Moisture associated dermatitis   [   ] Diabetic ulcer   [   ] Venous Stasis ulcer with varicose veins   [   ] Venous Stasis ulcer without varicose veins   [   ] Non-pressure ulcer, skin  breakdown only   [   ] Non-pressure ulcer, exposed fat layer   [   ] Non-pressure ulcer, muscle involvement without evidence of necrosis   [   ] Non-pressure ulcer, muscle necrosis   [   ] Non-pressure ulcer, bone involvement without evidence of necrosis   [   ] Non-pressure ulcer, bone necrosis   [   ] Non-pressure ulcer, other depth (please specify): ___________   [   ] Other Integumentary Diagnosis (please specify):______________   [  ] Clinically Undetermined       Present on admission (POA) status:    [   ] Yes (Y)   [   ] No (N)   [   ] Documentation insufficient to determine if condition is POA (U)   [  ] Clinically Undetermined (W)       Please document in your progress notes daily for the duration of treatment until resolved and include in your discharge summary.    Reference:    HARSH Garcia., GUANAKO Oliver., Goldberg, M., SHANNON Anne., SHANNON Brown., & SUN Schaeffer. (2016). Revised National Pressure Ulcer Advisory Panel Pressure Injury Staging System: Revised Pressure Injury Staging System. J Wound Ostomy Continence Nurs, 43(6), 585-597. doi:10.1097/won.[id number]    Form No.00002

## 2022-09-07 NOTE — ASSESSMENT & PLAN NOTE
Continue medical therapy as tolerated.  Patient adamant she cannot tolerate any statin therapy at any dose and declining a trial of statin therapy.

## 2022-09-07 NOTE — PROGRESS NOTES
"[facility] Medicine  Progress Note    Patient Name: Odette Hart  MRN: [id number]  Patient Class: IP- Inpatient   Admission Date: 8/26/2022  Length of Stay: 12 days  Attending Physician: Nghia Hunt MD  Primary Care Provider: Braxton Barragan MD        Subjective:     Principal Problem:Acute combined systolic and diastolic congestive heart failure        HPI:  Per Michael Long, NP:  "The patient is a 66 y.o. female with a past medical history of MI, HLD, CAD, cardiomyopathy, HTN, and CHF who presents for evaluation of shortness of breath onset earlier today. She reports "I've been feeling bad," endorsing associated symptoms of nausea and weakness. She states she has been near vomiting but denies any vomiting episodes. She reports elevated temperature at 99 F last night. She states her current presentations feel similar to her previous hospitalizations for dyspnea. Pt is regularly followed by cardiology. She denies pain elsewhere and other somatic complaints.  BNP elevated to greater than 3000 with pulmonary edema noted on CXR.  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    Started on Lasix for diuresis, but course complicated with hypotension and hyponatremia.  Carvedilol discontinued on 9/3/2022 by Cardiology service and midodrine added back on 9/4/2022.  SBP a little improved today.  Urine output recorded only as 450ml overnight.  Patient with " chronic hyponatremia with recent baseline of 129 and likely Cardiorenal in origin.  Received Tolvaptan x 2 and now increased to TIW.  Followed by Nephrology.  Na stable. Patient open to skilled nursing placement.  Working on placement.      Interval History: No acute events. Shortness of breath improved.    Review of Systems   Constitutional:  Positive for fatigue. Negative for chills and fever.   Respiratory:  Positive for shortness of breath.    Cardiovascular:  Negative for chest pain.   Gastrointestinal:  Negative for abdominal pain, nausea and vomiting.   Genitourinary:  Negative for dysuria and frequency.     Objective:     Vital Signs (Most Recent):  Temp: 97.8 °F (36.6 °C) (09/07/22 1148)  Pulse: 104 (09/07/22 1200)  Resp: 18 (09/07/22 1148)  BP: (!) 95/55 (09/07/22 1148)  SpO2: (!) 93 % (09/07/22 1148)   Vital Signs (24h Range):  Temp:  [97.5 °F (36.4 °C)-99.7 °F (37.6 °C)] 97.8 °F (36.6 °C)  Pulse:  [] 104  Resp:  [16-19] 18  SpO2:  [93 %-97 %] 93 %  BP: (83-97)/(37-55) 95/55     Weight: 53.7 kg (118 lb 6.2 oz)  Body mass index is 20.97 kg/m².    Intake/Output Summary (Last 24 hours) at 9/7/2022 1233  Last data filed at 9/7/2022 0000  Gross per 24 hour   Intake --   Output 1900 ml   Net -1900 ml        Physical Exam  Constitutional:       General: She is not in acute distress.  HENT:      Head: Atraumatic.   Eyes:      Conjunctiva/sclera: Conjunctivae normal.   Cardiovascular:      Rate and Rhythm: Normal rate and regular rhythm.      Heart sounds: Murmur heard.   Pulmonary:      Effort: Pulmonary effort is normal.      Breath sounds: Normal breath sounds. No wheezing.   Abdominal:      General: Bowel sounds are normal. There is no distension.      Palpations: Abdomen is soft.      Tenderness: There is no abdominal tenderness.      Comments: Colostomy with stool in bag.   Musculoskeletal:         General: No deformity. Normal range of motion.      Cervical back: Neck supple.   Neurological:       Mental Status: She is alert and oriented to person, place, and time.       Significant Labs: All pertinent labs within the past 24 hours have been reviewed.    Significant Imaging: I have reviewed all pertinent imaging results/findings within the past 24 hours.        Assessment/Plan:      * Acute combined systolic and diastolic congestive heart failure  Discussed with Dr. Yohannes Cordova (Cardiology).  Patient has difficulty tolerating goal-directed therapy for heart failure due to hypertension and also reports side effects to numerous medications in the past.  Patient counseled importance of adhering to medical therapy as much as possible to improve her chances of improving her cardiac function and decreasing her mortality.  Plan to hold midodrine today.  If blood pressure can tolerate will start low-dose of metoprolol tomorrow 12.5 mg twice daily.    Encounter for palliative care  Followed by Palliative Care  Patient is Full Code      Debility  Continue with therapy.    Hyponatremia  Chronic with recent baseline of 129 and likely from Cardiorenal in origin.  Received Tolvaptan x 2 and now starting biw.  Followed by Nephrology.  Na stable at 128 today from 130 yesterday    Plan:  Follow up with Nephrology recommendations - Tolvaptan increased to 30mg three times weekly  Follow BMP           Primary hypertension  Continue diuretic.  Hold midodrine.  Hope to introduce low-dose beta-blocker if she can tolerate.    Familial hypercholesterolemia  Has previous Statin intolerance.  -Continue Zetia      Coronary artery disease of native artery of native heart with stable angina pectoris  Continue medical therapy as tolerated.  Patient adamant she cannot tolerate any statin therapy at any dose and declining a trial of statin therapy.      VTE Risk Mitigation (From admission, onward)         Ordered     Place JESS hose  Until discontinued         09/02/22 0910     enoxaparin injection 40 mg  Daily         08/26/22 0325     IP  VTE HIGH RISK PATIENT  Once         08/26/22 0325     Place sequential compression device  Until discontinued         08/26/22 0325                Nghia Hunt MD  Department of Hospital Medicine   [facility], [place], operated by [facility] - Med Surg ([place])

## 2022-09-07 NOTE — PROGRESS NOTES
[facility] Nephrology Progress Note    Date of Admit: 2022    Chief Complaint/Reason for Consult     Shortness of Breath (Pt c/o difficulty breathing with associated weakness and nausea.  Pmh of CHF)    Reason for consult: Hyponatremia    Subjective:      History of Present Illness:  Odette Hart is a 66 y.o. female who  presented to [facility] on 22 with worsening shortness of breath. Has history of CHFrEF and has had multiple hospitalizations for dyspnea. BNP > 3k on presentation and pulmonary edema noted on CXR. EF of 10% on last Echo. Patient diuresed with IV Lasix but noted to have worsening hyponatremia prompting nephrology eval.    Interval history:       NA improved and diuresing well. Remains hypotensive.  Discussed with team.  Pt reluctant to med changes.  Still LI.     Review of Systems:  Pertinent positives and negatives are listed in HPI.  All other systems are reviewed and are negative.     Objective:   Last 24 Hour Vital Signs:  BP  Min: 83/48  Max: 97/51  Temp  Av.3 °F (36.8 °C)  Min: 97.4 °F (36.3 °C)  Max: 99.7 °F (37.6 °C)  Pulse  Av.5  Min: 80  Max: 103  Resp  Av.9  Min: 16  Max: 19  SpO2  Av.5 %  Min: 94 %  Max: 100 %  Body mass index is 20.97 kg/m².  I/O last 3 completed shifts:  In: -   Out: 2350 [Urine:2350]      Physical Examination:  Gen: NAD, AAOx3  HEENT: NCAT, EOMI, PERRL, MM, pink conjunctiva  Neck: no thyroidmegaly, no LAD  Cardio: RRR, normal S1/S2, no MRG, JVP wnl  Lungs:  diminished.    Abd: soft non tender, non distended, normal bowel sounds, no hepatosplenomegaly, +colostomy.   Ext: 2+ pulses B/L, no clubbing, cyanosis, 1+ feet edema  Skin: warm, dry, no rashes noted  Neuro: CN 2-12 grossly intact, UE/LE motor 5/5, sensation intact  Psych: conversational, responsive      Laboratory:  Most Recent Data:    Recent Labs   Lab 22  0459   *   K 3.6   CL 91*   CO2 34*   BUN 9   CREATININE 0.7   CALCIUM 8.2*   PHOS 3.3              Radiology:  CT Head Without Contrast   Final Result      No acute intracranial abnormality.         Electronically signed by: Vicente Nichols   Date:    09/03/2022   Time:    16:06      X-Ray Chest 1 View   Final Result      Slight improvement of aeration of the lungs bilaterally         Electronically signed by: Asael Ortiz   Date:    09/01/2022   Time:    11:27      X-Ray Chest AP Portable   Final Result      Cardiomegaly and findings suggestive of pulmonary edema/CHF although correlation for infectious process advised.         Electronically signed by: Lucy Tovar MD   Date:    08/26/2022   Time:    03:21           Assessment and Plan:      Odette Hart is a 66 y.o.female    Finally stabilized Hyponatremia-hypervolemic  - does have history of chronic hyponatremia with baseline around 129  - On 8/27 noted to have worsening level to 125. Held Lasix and repeated NA level and was up to 131 but suspect labs error and was stagnant at 127  -failed trial of lasix/danielito  -Na improving on Samsca 3 times a week and will change to twice weekly, hold danielito, but continue lasix.  Consider Kerendia.  -Will need to get insurance approval of samsca for outpatient use prior to discharge.      Acute on chronic systolic heart failure  - severely reduced EF of 10% earlier this month  - Cardio following  - BP likely too low to tolerate ACE/ARB but defer to Cards  - see below.  -SGLT2 or Kerendia as outpt.     Hypotension  -defer to cards.  -place teds    Normocytic Anemia:  -RIGO noted.  -dosed venofer.    End stage HF as above:  -appropriate for home based palliative care and pt now open to it.  -consult placed.         See above  Highly recommend palliative based approach based on End stage heart failure and pt reluctant to medical therapy.  Nothing much else to offer.

## 2022-09-08 NOTE — ASSESSMENT & PLAN NOTE
Discussed with Dr. Yohannes Cordova (Cardiology).  Patient has difficulty tolerating goal-directed therapy for heart failure due to hypertension and also reports side effects to numerous medications in the past.  Patient counseled importance of adhering to medical therapy as much as possible to improve her chances of improving her cardiac function and decreasing her mortality.  Discontinued midodrine yesterday.  Blood pressure okay.  Started low dose metoprolol this morning 12.5 mg twice daily with plan to slowly titrate.

## 2022-09-08 NOTE — PLAN OF CARE
Problem: Adult Inpatient Plan of Care  Goal: Plan of Care Review  Outcome: Ongoing, Progressing  Goal: Patient-Specific Goal (Individualized)  Outcome: Ongoing, Progressing  Goal: Absence of Hospital-Acquired Illness or Injury  Outcome: Ongoing, Progressing  Goal: Optimal Comfort and Wellbeing  Outcome: Ongoing, Progressing  Goal: Readiness for Transition of Care  Outcome: Ongoing, Progressing     Problem: Impaired Wound Healing  Goal: Optimal Wound Healing  Outcome: Ongoing, Progressing     Problem: Skin Injury Risk Increased  Goal: Skin Health and Integrity  Outcome: Ongoing, Progressing     Problem: Coping Ineffective  Goal: Effective Coping  Outcome: Ongoing, Progressing

## 2022-09-08 NOTE — PT/OT/SLP PROGRESS
Occupational Therapy   Treatment    Name: Odette Hart  MRN: [id number]  Admitting Diagnosis:  Acute combined systolic and diastolic congestive heart failure       Recommendations:     Discharge Recommendations: home health PT, home health OT  Discharge Equipment Recommendations:  none  Barriers to discharge:  None    Assessment:     Odette Hart is a 66 y.o. female with a medical diagnosis of Acute combined systolic and diastolic congestive heart failure.  She presents with weakness, impaired endurance, impaired functional mobility, impaired self care skills, impaired cardiopulmonary response to activity.     Discussed dc with patient, she would like to go home with HH OT/PT and wound care. Pt states she will have daytime assistance from friends.    Rehab Prognosis:  Good; patient would benefit from acute skilled OT services to address these deficits and reach maximum level of function.       Plan:     Patient to be seen 4 x/week to address the above listed problems via self-care/home management, therapeutic activities, therapeutic exercises  Plan of Care Expires: 09/25/22  Plan of Care Reviewed with: patient    Subjective     Pain/Comfort:  Pain Rating 1: other (see comments) (unrated pain to B shoulders during dressing)  Pain Addressed 1: Reposition    Objective:     Communicated with: RN prior to session.  Patient found  sitting on toilet  with colostomy, telemetry, peripheral IV upon OT entry to room.    General Precautions: Standard, fall   Orthopedic Precautions:N/A   Braces: N/A  Respiratory Status: Room air     Occupational Performance:      Functional Mobility/Transfers:  Sit <> Stand: SBA with RW  Toilet: min A to stabilize RW  Functional Mobility: Pt required SBA and RW for functional ambulation, approx 7 minutes, with 2 standing rest breaks 2/2 fatigue. Pt would benefit from further endurance retraining     Activities of Daily Living:  Grooming: SBA in standing at sink for oral care and hand  hygiene  Toileting: Independent in sitting   Feeding: Independent in sitting       AMPAC 6 Click ADL: 20    Treatment & Education:  OT role, plan of care, progression of goals, importance of continued OOB activity, ADL/functional mobility/transfer retraining, fall prevention, safety precautions    Patient left up in chair with all lines intact, call button in reach, and RN notified    GOALS:   Multidisciplinary Problems       Occupational Therapy Goals          Problem: Occupational Therapy    Goal Priority Disciplines Outcome Interventions   Occupational Therapy Goal     OT, PT/OT Ongoing, Progressing    Description: Goals to be met by: 9/3/22    Patient will increase functional independence with ADLs by performing:    LB dressing at SBA.  BSC/toilet transfer at mod I  UBD at Independent level  Grooming in standing at Independent level     MET GOALS:   BSC/toilet transfers at SBA. MET 8/31/2022   ToIleting at SBA.  MET 8/31/2022   UB dressing at SBA.  MET 8/31/2022   Grooming sitting EOB at SBA.  MET 8/31/2022 in standing  Toileting at Independent level. MET 9/8/22                       Time Tracking:     OT Date of Treatment: 09/08/22  OT Start Time: 1157  OT Stop Time: 1220  OT Total Time (min): 23 min    Billable Minutes:Self Care/Home Management 23 minutes    OT/FROILAN: OT          9/8/2022

## 2022-09-08 NOTE — PROGRESS NOTES
[facility] ([place])  Adult Nutrition  Progress Note    SUMMARY     Recommendations  1) Add Boost Plus daily for additional calories/protein  2) Continue Ezra BID   3) Continue Low Na diet with FR per MD - encourage PO intake  4) RD to complete education on follow up  5) RD to complete NFPE at bedside as able  6) Obtain new wt    Goals: Patient to consume, tolerate, and understand the importance of compliance of recommended diet.  Nutrition Goal Status: progressing towards goal  Communication of RD Recs:  (POC)    Assessment and Plan  Nutrition Problem  Increased nutritional needs     Related to (etiology):   Condition associated with diagnoses     Signs and Symptoms (as evidenced by):   Wounds, delayed healing and decreased po intake      Interventions    Collaboration with other providers  Mineral modified diet (low salt)  Commercial Beverage    Nutrition Diagnosis Status:   Continues    Malnutrition Assessment   KEO NFPE due remote assessment  UBW ~ 115 lbs per chart, fluid possibly masking wt loss    Reason for Assessment  Reason For Assessment: RD follow-up  Diagnosis: cardiac disease, gastrointestinal disease  Past Medical History:   Diagnosis Date    Acute coronary syndrome     Acute exacerbation of chronic obstructive pulmonary disease (COPD) 3/23/2022    Allergy     Arthritis     Cardiomyopathy     CHF (congestive heart failure)     Coronary artery disease     Coronary artery disease of native artery of native heart with stable angina pectoris 4/19/2021 1/24/2022: [facility]: Cath: LAD: Proximal stent patent. LCX: Proximal 80%. LCX: Dominant. Moderate disease. LCX: HEVER 2.75 x 18 mm. Distal dissection. HEVER 2.75 x 22 mm.     Diverticulitis     Diverticulosis     Familial hypercholesterolemia 1/22/2022    Former smoker 1/24/2022    Heart attack     Heart disease     History of myocardial infarction 1/24/2022    Hyperlipidemia     Hypertension     Hypertension 1/24/2022    ICD (implantable  "cardioverter-defibrillator) in place     Non-ST elevation myocardial infarction (NSTEMI) 2/4/2019       Interdisciplinary Rounds: did not attend  General Information Comments: Remote RD for coverage, called pt on room phone - no answer. Spoke with RN - no GI complaints, pt ate about 50% breakfast. 25% intake in chart for other meals recorded. -16.7 L since admit - new wt needed to confirm wt status. KEO NFPE due to remote assessment. LBM 9/7. RD to follow up.  Nutrition Discharge Planning: Patient to consume and tolerate adequate nutrition to support wound healing. Low sodium diet    Nutrition Risk Screen  Nutrition Risk Screen: large or nonhealing wound, burn or pressure injury    Nutrition/Diet History  Spiritual, Cultural Beliefs, [employer]al Practices, Values that Affect Care: no  Supplemental Drinks or Food Habits: Ezra  Food Allergies: NKFA  Factors Affecting Nutritional Intake: malabsorption    Anthropometrics  Temp: 97.4 °F (36.3 °C)  Height Method: Stated  Height: 5' 3" (160 cm)  Height (inches): 63 in  Weight Method: Bed Scale  Weight: 53.7 kg (118 lb 6.2 oz)  Weight (lb): 118.39 lb  Ideal Body Weight (IBW), Female: 115 lb  % Ideal Body Weight, Female (lb): 102.96 %  BMI (Calculated): 21  BMI Grade: 18.5-24.9 - normal       Lab/Procedures/Meds  Pertinent Labs Reviewed: reviewed  Pertinent Labs Comments: Na 133, Mg 1.4, Alb 1.7  Pertinent Medications Reviewed: reviewed  Pertinent Medications Comments: docusate sodium, furosemide, lopressor, pantoprazole, magnesium oxide    Physical Findings/Assessment  Skin tear elbow     Estimated/Assessed Needs  Weight Used For Calorie Calculations: 53.7 kg (118 lb 6.2 oz)  Energy Calorie Requirements (kcal): 25-30kcals (1342-1611kcals per day)     Protein Requirements: 1.0-1.2g/kg  Weight Used For Protein Calculations: 53.7 kg (118 lb 6.2 oz)  Fluid Requirements (mL): 1500ml fluid restriction  Estimated Fluid Requirement Method: other (see comments) (fluid " restriction)  RDA Method (mL): 25  CHO Requirement: 168-201    Nutrition Prescription Ordered  Current Diet Order: Low Na 1000 mL FR  Oral Nutrition Supplement: Ezra BID    Evaluation of Received Nutrient/Fluid Intake    Intake/Output Summary (Last 24 hours) at 9/8/2022 1148  Last data filed at 9/8/2022 0125  Gross per 24 hour   Intake 370 ml   Output 2000 ml   Net -1630 ml     % Kcal Needs: 50  % Protein Needs: 50  I/O: -16.7 L since admit  Energy Calories Required: not meeting needs  Protein Required: not meeting needs  Fluid Required: meeting needs  Comments: LBM 9/7  Tolerance: tolerating  % Intake of Estimated Energy Needs: 25 - 50 %  % Meal Intake: 25 - 50 %    Nutrition Risk  Level of Risk/Frequency of Follow-up:  (1-2x weekly)     Monitor and Evaluation  Food and Nutrient Intake: energy intake, food and beverage intake  Food and Nutrient Adminstration: diet order  Knowledge/Beliefs/Attitudes: food and nutrition knowledge/skill, beliefs and attitudes  Physical Activity and Function: nutrition-related ADLs and IADLs, factors affecting access to physical activity  Anthropometric Measurements: height/length, weight, weight change, body mass index  Biochemical Data, Medical Tests and Procedures: glucose/endocrine profile, gastrointestinal profile, electrolyte and renal panel, lipid profile, inflammatory profile     Nutrition Follow-Up    RD Follow-up?: Yes

## 2022-09-08 NOTE — PROGRESS NOTES
"[facility] Medicine  Progress Note    Patient Name: Odette Hart  MRN: [id number]  Patient Class: IP- Inpatient   Admission Date: 8/26/2022  Length of Stay: 13 days  Attending Physician: Nghia Hunt MD  Primary Care Provider: Braxton Barragan MD        Subjective:     Principal Problem:Acute combined systolic and diastolic congestive heart failure        HPI:  Per Michael Long, NP:  "The patient is a 66 y.o. female with a past medical history of MI, HLD, CAD, cardiomyopathy, HTN, and CHF who presents for evaluation of shortness of breath onset earlier today. She reports "I've been feeling bad," endorsing associated symptoms of nausea and weakness. She states she has been near vomiting but denies any vomiting episodes. She reports elevated temperature at 99 F last night. She states her current presentations feel similar to her previous hospitalizations for dyspnea. Pt is regularly followed by cardiology. She denies pain elsewhere and other somatic complaints.  BNP elevated to greater than 3000 with pulmonary edema noted on CXR.  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    Started on Lasix for diuresis, but course complicated with hypotension and hyponatremia.  Carvedilol discontinued on 9/3/2022 by Cardiology service and midodrine added back on 9/4/2022.  SBP a little improved today.  Urine output recorded only as 450ml overnight.  Patient with " chronic hyponatremia with recent baseline of 129 and likely Cardiorenal in origin.  Received Tolvaptan x 2 and now increased to TIW.  Followed by Nephrology.  Na stable. Patient open to skilled nursing placement.  Working on placement.      Interval History: No acute events. Shortness of breath improved.    Review of Systems   Constitutional:  Positive for fatigue. Negative for chills and fever.   Respiratory:  Positive for shortness of breath.    Cardiovascular:  Negative for chest pain.   Gastrointestinal:  Negative for abdominal pain, nausea and vomiting.   Genitourinary:  Negative for dysuria and frequency.     Objective:     Vital Signs (Most Recent):  Temp: 97.7 °F (36.5 °C) (09/08/22 1558)  Pulse: 90 (09/08/22 1600)  Resp: 16 (09/08/22 1558)  BP: (!) 93/50 (09/08/22 1558)  SpO2: 97 % (09/08/22 1558)   Vital Signs (24h Range):  Temp:  [97.4 °F (36.3 °C)-97.9 °F (36.6 °C)] 97.7 °F (36.5 °C)  Pulse:  [] 90  Resp:  [16-20] 16  SpO2:  [93 %-100 %] 97 %  BP: (76-98)/(50-80) 93/50     Weight: 53.7 kg (118 lb 6.2 oz)  Body mass index is 20.97 kg/m².    Intake/Output Summary (Last 24 hours) at 9/8/2022 1749  Last data filed at 9/8/2022 1700  Gross per 24 hour   Intake 370 ml   Output 2700 ml   Net -2330 ml        Physical Exam  Constitutional:       General: She is not in acute distress.  HENT:      Head: Atraumatic.   Eyes:      Conjunctiva/sclera: Conjunctivae normal.   Cardiovascular:      Rate and Rhythm: Normal rate and regular rhythm.      Heart sounds: Murmur heard.   Pulmonary:      Effort: Pulmonary effort is normal.      Breath sounds: Normal breath sounds. No wheezing.   Abdominal:      General: Bowel sounds are normal. There is no distension.      Palpations: Abdomen is soft.      Tenderness: There is no abdominal tenderness.      Comments: Colostomy with stool in bag.   Musculoskeletal:         General: No deformity. Normal range of motion.      Cervical back: Neck supple.   Neurological:       Mental Status: She is alert and oriented to person, place, and time.       Significant Labs: All pertinent labs within the past 24 hours have been reviewed.    Significant Imaging: I have reviewed all pertinent imaging results/findings within the past 24 hours.        Assessment/Plan:      * Acute combined systolic and diastolic congestive heart failure  Discussed with Dr. Yohannes Cordova (Cardiology).  Patient has difficulty tolerating goal-directed therapy for heart failure due to hypertension and also reports side effects to numerous medications in the past.  Patient counseled importance of adhering to medical therapy as much as possible to improve her chances of improving her cardiac function and decreasing her mortality.  Discontinued midodrine yesterday.  Blood pressure okay.  Started low dose metoprolol this morning 12.5 mg twice daily with plan to slowly titrate.    Encounter for palliative care  Followed by Palliative Care  Patient is Full Code      Debility  Continue with therapy.    Hyponatremia  Chronic with recent baseline of 129 and likely from Cardiorenal in origin.  Received Tolvaptan x 2 and now starting biw.  Followed by Nephrology.  Na stable at 128 today from 130 yesterday    Plan:  Follow up with Nephrology recommendations - Tolvaptan increased to 30mg three times weekly  Follow BMP           Primary hypertension  Continue diuretic.  Hold midodrine.  Hope to introduce low-dose beta-blocker if she can tolerate.    Familial hypercholesterolemia  Has previous Statin intolerance.  -Continue Zetia      Coronary artery disease of native artery of native heart with stable angina pectoris  Continue medical therapy as tolerated.  Patient adamant she cannot tolerate any statin therapy at any dose and declining a trial of statin therapy.      VTE Risk Mitigation (From admission, onward)         Ordered     Place JESS hose  Until discontinued         09/02/22 0910     enoxaparin injection 40 mg  Daily          08/26/22 0325     IP VTE HIGH RISK PATIENT  Once         08/26/22 0325     Place sequential compression device  Until discontinued         08/26/22 0325                Nghia Hunt MD  Department of Hospital Medicine   [facility]urg ([place])

## 2022-09-08 NOTE — PLAN OF CARE
Problem: Occupational Therapy  Goal: Occupational Therapy Goal  Description: Goals to be met by: 9/3/22    Patient will increase functional independence with ADLs by performing:    LB dressing at SBA.  BSC/toilet transfer at mod I  UBD at Independent level  Grooming in standing at Independent level     MET GOALS:   BSC/toilet transfers at SBA. MET 8/31/2022   ToIleting at SBA.  MET 8/31/2022   UB dressing at SBA.  MET 8/31/2022   Grooming sitting EOB at SBA.  MET 8/31/2022 in standing  Toileting at Independent level. MET 9/8/22  Outcome: Ongoing, Progressing     Goals updated. OT to continue POC. Pt progressing well. SBA for toileting, transfers, grooming.     DC: Home with  OT/PT. Pt states she will have assist from friends during daytime hours.  DME: none. Pt reports she has a RW and rollator in working order.
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
## 2022-09-08 NOTE — PROGRESS NOTES
[facility] Nephrology Progress Note    Date of Admit: 2022    Chief Complaint/Reason for Consult     Shortness of Breath (Pt c/o difficulty breathing with associated weakness and nausea.  Pmh of CHF)    Reason for consult: Hyponatremia    Subjective:      History of Present Illness:  Odette Hart is a 66 y.o. female who  presented to [facility] on 22 with worsening shortness of breath. Has history of CHFrEF and has had multiple hospitalizations for dyspnea. BNP > 3k on presentation and pulmonary edema noted on CXR. EF of 10% on last Echo. Patient diuresed with IV Lasix but noted to have worsening hyponatremia prompting nephrology eval.    Interval history:       Pt now asking to go home.  Labs stable.  Still SOB/LI/leg edema.  Discussed with team and fear she will be a revolving door based on trends/habits.     Review of Systems:  Pertinent positives and negatives are listed in HPI.  All other systems are reviewed and are negative.     Objective:   Last 24 Hour Vital Signs:  BP  Min: 76/53  Max: 98/55  Temp  Av.7 °F (36.5 °C)  Min: 97.4 °F (36.3 °C)  Max: 97.9 °F (36.6 °C)  Pulse  Av.3  Min: 88  Max: 120  Resp  Av.3  Min: 16  Max: 20  SpO2  Av.2 %  Min: 93 %  Max: 100 %  Body mass index is 20.97 kg/m².  I/O last 3 completed shifts:  In: 370 [P.O.:370]  Out: 3900 [Urine:3900]      Physical Examination:  Gen: NAD, AAOx3  HEENT: NCAT, EOMI, PERRL, MM, pink conjunctiva  Neck: no thyroidmegaly, no LAD  Cardio: RRR, normal S1/S2, no MRG, JVP wnl  Lungs:  diminished.    Abd: soft non tender, non distended, normal bowel sounds, no hepatosplenomegaly, +colostomy.   Ext: 2+ pulses B/L, no clubbing, cyanosis, 1+ feet edema  Skin: warm, dry, no rashes noted  Neuro: CN 2-12 grossly intact, UE/LE motor 5/5, sensation intact  Psych: conversational, responsive      Laboratory:  Most Recent Data:    Recent Labs   Lab 22  0428   *   K 3.5   CL 91*   CO2 32*   BUN 9    CREATININE 0.6   CALCIUM 8.1*   PHOS 2.9             Radiology:  CT Head Without Contrast   Final Result      No acute intracranial abnormality.         Electronically signed by: Vicente Nichols   Date:    09/03/2022   Time:    16:06      X-Ray Chest 1 View   Final Result      Slight improvement of aeration of the lungs bilaterally         Electronically signed by: Asael Ortiz   Date:    09/01/2022   Time:    11:27      X-Ray Chest AP Portable   Final Result      Cardiomegaly and findings suggestive of pulmonary edema/CHF although correlation for infectious process advised.         Electronically signed by: Lucy Tovar MD   Date:    08/26/2022   Time:    03:21           Assessment and Plan:      Odette Hart is a 66 y.o.female    Finally stabilized Hyponatremia-hypervolemic  - does have history of chronic hyponatremia with baseline around 129  - On 8/27 noted to have worsening level to 125. Held Lasix and repeated NA level and was up to 131 but suspect labs error and was stagnant at 127  -failed trial of lasix/danielito  -Na improving on Samsca 3 times a week and changed to twice weekly, hold danielito, but continue lasix.    -Will need to get insurance approval of samsca for outpatient use prior to discharge.      Acute on chronic systolic heart failure  - severely reduced EF of 10% earlier this month  - Cardio following  - BP likely too low to tolerate ACE/ARB but defer to Cards  - see below.  -SGLT2 or Eplerenone  -BB started today.     Hypotension  -defer to cards.  -place teds    Normocytic Anemia:  -RIGO noted.  -dosed venofer.    End stage HF as above:  -appropriate for home based palliative care and pt now open to it.  -consult placed.     Hypomag likely from diuretics/PPI:  -change to H2.        See above  Highly recommend palliative based approach based on End stage heart failure as pt reluctant to medical therapy.  She will be a revolving door.  Nothing much else to offer and will sign off.  Thanks

## 2022-09-08 NOTE — PT/OT/SLP PROGRESS
Physical Therapy      Patient Name:  Odette Hart   MRN:  [id number]    Patient not seen today secondary to Nursing care. Will follow-up as schedule allows and as pt is available / appropriate for therapy services.

## 2022-09-08 NOTE — SUBJECTIVE & OBJECTIVE
Interval History: No acute events. Shortness of breath improved.    Review of Systems   Constitutional:  Positive for fatigue. Negative for chills and fever.   Respiratory:  Positive for shortness of breath.    Cardiovascular:  Negative for chest pain.   Gastrointestinal:  Negative for abdominal pain, nausea and vomiting.   Genitourinary:  Negative for dysuria and frequency.     Objective:     Vital Signs (Most Recent):  Temp: 97.7 °F (36.5 °C) (09/08/22 1558)  Pulse: 90 (09/08/22 1600)  Resp: 16 (09/08/22 1558)  BP: (!) 93/50 (09/08/22 1558)  SpO2: 97 % (09/08/22 1558)   Vital Signs (24h Range):  Temp:  [97.4 °F (36.3 °C)-97.9 °F (36.6 °C)] 97.7 °F (36.5 °C)  Pulse:  [] 90  Resp:  [16-20] 16  SpO2:  [93 %-100 %] 97 %  BP: (76-98)/(50-80) 93/50     Weight: 53.7 kg (118 lb 6.2 oz)  Body mass index is 20.97 kg/m².    Intake/Output Summary (Last 24 hours) at 9/8/2022 1749  Last data filed at 9/8/2022 1700  Gross per 24 hour   Intake 370 ml   Output 2700 ml   Net -2330 ml        Physical Exam  Constitutional:       General: She is not in acute distress.  HENT:      Head: Atraumatic.   Eyes:      Conjunctiva/sclera: Conjunctivae normal.   Cardiovascular:      Rate and Rhythm: Normal rate and regular rhythm.      Heart sounds: Murmur heard.   Pulmonary:      Effort: Pulmonary effort is normal.      Breath sounds: Normal breath sounds. No wheezing.   Abdominal:      General: Bowel sounds are normal. There is no distension.      Palpations: Abdomen is soft.      Tenderness: There is no abdominal tenderness.      Comments: Colostomy with stool in bag.   Musculoskeletal:         General: No deformity. Normal range of motion.      Cervical back: Neck supple.   Neurological:      Mental Status: She is alert and oriented to person, place, and time.       Significant Labs: All pertinent labs within the past 24 hours have been reviewed.    Significant Imaging: I have reviewed all pertinent imaging results/findings within the  past 24 hours.

## 2022-09-08 NOTE — PLAN OF CARE
Recommendations  1) Add Boost Plus daily for additional calories/protein  2) Continue Ezra BID   3) Continue Low Na diet with FR per MD - encourage PO intake  4) RD to complete education on follow up  5) RD to complete NFPE at bedside as able  6) Obtain new wt    Goals: Patient to consume, tolerate, and understand the importance of compliance of recommended diet.  Nutrition Goal Status: progressing towards goal  Communication of RD Recs:  (POC)    Assessment and Plan  Nutrition Problem  Increased nutritional needs     Related to (etiology):   Condition associated with diagnoses     Signs and Symptoms (as evidenced by):   Wounds, delayed healing and decreased po intake      Interventions   Collaboration with other providers  Mineral modified diet (low salt)  Com[employer] Beverage    Nutrition Diagnosis Status:   Continues

## 2022-09-09 NOTE — PT/OT/SLP PROGRESS
Physical Therapy Treatment    Patient Name:  Odette Hart   MRN:  [id number]    Recommendations:     Discharge Recommendations:  home health OT, home health PT   Discharge Equipment Recommendations: other (see comments) (TBD pending patient progress)   Barriers to discharge: Decreased caregiver support    Assessment:     Odette Hart is a 66 y.o. female admitted with a medical diagnosis of Acute combined systolic and diastolic congestive heart failure.  She presents with the following impairments/functional limitations:  weakness, impaired endurance, impaired cardiopulmonary response to activity, impaired functional mobility, impaired self care skills.    Sit>stand from chair with RW and Danii 2/2 knee pain  Amb 300' with RW and mostly SBA, occ CGA, pt took 3 standing rest breaks with c/o SoB  Sit>supine with CGA  SpO2 during gait bout 78-86%, recovery to 96% on RA, left pt on 1L O2 for comfort, RN notified  Pt with good mobility, req'ing frequent rest breaks 2/2 SoB  Rec HHPT    Rehab Prognosis: Good; patient would benefit from acute skilled PT services to address these deficits and reach maximum level of function.    Recent Surgery: * No surgery found *      Plan:     During this hospitalization, patient to be seen 3 x/week to address the identified rehab impairments via gait training, therapeutic activities, therapeutic exercises, neuromuscular re-education and progress toward the following goals:    Plan of Care Expires:  09/28/22    Subjective     Chief Complaint: SoB  Patient/Family Comments/goals: pt agreeable to therapy, prepping to go home today  Pain/Comfort:  Pain Rating 1: other (see comments) (not rated)  Location - Side 1: Bilateral  Location 1: knee  Pain Addressed 1: Reposition, Distraction, Cessation of Activity, Nurse notified  Pain Rating Post-Intervention 1: other (see comments) (no change)      Objective:     Communicated with nurse Antoine prior to session.  Patient found up in chair  with colostomy, telemetry, peripheral IV upon PT entry to room.     General Precautions: Standard, fall   Orthopedic Precautions:N/A   Braces:    Respiratory Status: Room air     Functional Mobility:  Bed Mobility:     Sit to Supine: contact guard assistance  Transfers:     Sit to Stand:  minimum assistance with rolling walker  Gait: 300' with 3 standing rest breaks, RW and SBA, occ CGA, c/o SoB      AM-PAC 6 CLICK MOBILITY  Turning over in bed (including adjusting bedclothes, sheets and blankets)?: 4  Sitting down on and standing up from a chair with arms (e.g., wheelchair, bedside commode, etc.): 4  Moving from lying on back to sitting on the side of the bed?: 4  Moving to and from a bed to a chair (including a wheelchair)?: 3  Need to walk in hospital room?: 3  Climbing 3-5 steps with a railing?: 3  Basic Mobility Total Score: 21       Therapeutic Activities and Exercises:   SpO2 during gait bout 78-86%, recovery to 96% on RA, left pt on 1L O2 for comfort, RN notified  Gait training as noted  Will bring exercise booklet for pt to use at home    Patient left right sidelying with all lines intact, call button in reach, and nurse Fiorella notified..    GOALS:   Multidisciplinary Problems       Physical Therapy Goals          Problem: Physical Therapy    Goal Priority Disciplines Outcome Goal Variances Interventions   Physical Therapy Goal     PT, PT/OT      Description: Goals to be met by: Discharge     Patient will increase functional independence with mobility by performin. Gait  x 300 feet with Modified [place] using LRAD, w/o LOB.                    Multidisciplinary Problems (Resolved)          Problem: Physical Therapy    Goal Priority Disciplines Outcome Goal Variances Interventions   Physical Therapy Goal   (Resolved)     PT, PT/OT Met     Description: Goals to be met by: 2022    Patient will increase functional independence with mobility by performin. Sit<>stand with supervision with  least restrictive AD. - MET 9/2/22  2. Gait x 200 feet with SBA with least restrictive AD. - MET 9/2/22  3. Ascend/descend 4 step(s) with least restrictive assistive device and CGA. - MET 9/2/22                          Time Tracking:     PT Received On: 09/09/22  PT Start Time: 0856     PT Stop Time: 0920  PT Total Time (min): 24 min     Billable Minutes: Gait Training 24    Treatment Type: Treatment  PT/PTA: PTA     PTA Visit Number: 1     09/09/2022

## 2022-09-09 NOTE — PLAN OF CARE
CM met with pt to discuss discharge plan. Pt has two accepting facilities, however, she is not in agreement with either. [facility] does not have rails on the beds, and [facility] is not able to provide wd care at this time.    CM sent additional referrals to [facility], [facility], [facility], and [facility].     CM to follow for acceptance and for pt approval.   09/09/22 0806   Discharge Reassessment   Assessment Type Discharge Planning Reassessment   Did the patient's condition or plan change since previous assessment? No   Discharge Plan discussed with: Patient   Communicated SIVAN with patient/caregiver Date not available/Unable to determine   Discharge Plan A Skilled Nursing Facility   Discharge Plan B Home Health   DME Needed Upon Discharge  none   Discharge Barriers Identified Other (see comments)  (Pt not in agreement with accepting facilities)   Why the patient remains in the hospital Requires continued medical care   Post-Acute Status   Post-Acute Authorization Placement;Home Health   Post-Acute Placement Status Referrals Sent   Patient choice form signed by patient/caregiver List with quality metrics by geographic area provided;List from CMS Compare;List from System Post-Acute Care   Discharge Delays (!) Patient and Family Barriers  (Pt not in agreememt with accepting facilities)

## 2022-09-09 NOTE — PT/OT/SLP PROGRESS
Occupational Therapy   Treatment    Name: Odette Hart  MRN: [id number]  Admitting Diagnosis:  Acute combined systolic and diastolic congestive heart failure       Recommendations:     Discharge Recommendations: home with home health, home health PT, home health OT  Discharge Equipment Recommendations:  none  Barriers to discharge:  None    Assessment:     Odette Hart is a 66 y.o. female with a medical diagnosis of Acute combined systolic and diastolic congestive heart failure.  She presents with the following performance deficits affecting function: weakness, impaired endurance, impaired cardiopulmonary response to activity, impaired balance, decreased safety awareness, impaired self care skills, pain, impaired skin, impaired functional mobility, edema.     Rehab Prognosis:  Good; patient would benefit from acute skilled OT services to address these deficits and reach maximum level of function.       Plan:     Patient to be seen 4 x/week to address the above listed problems via self-care/home management, therapeutic activities, therapeutic exercises  Plan of Care Expires: 09/25/22  Plan of Care Reviewed with: patient    Subjective     Pain/Comfort:  Pain Rating 1: 10/10  Location - Side 1: Bilateral  Location - Orientation 1: generalized  Location 1: knee  Pain Addressed 1: Reposition, Distraction, Cessation of Activity, Nurse notified  Pain Rating Post-Intervention 1: 10/10    Objective:     Communicated with: Elina prior to session.  Patient found up in chair with peripheral IV, telemetry, colostomy upon OT entry to room.    General Precautions: Standard, anti-coagulation medicine, fall, other (see comments) (fluid restriction)   Orthopedic Precautions:N/A   Braces: N/A  Respiratory Status: Room air     Occupational Performance:     Bed Mobility:    Patient completed Rolling/Turning to Right with supervision  Patient completed Sit to Supine with minimum assistance     Functional  Mobility/Transfers:  Patient completed Sit <> Stand Transfer with contact guard assistance  with  rolling walker   Functional Mobility: CGA to SBA with RW household distance with RW    Activities of Daily Living:  Upper Body Dressing: contact guard assistance donning gown around back as robe  Lower Body Dressing: total assistance donning non skid socks from EOB      AMPAC 6 Click ADL: 19    Treatment & Education:  Educated on O2 sats during mobility, hand placement for safe transfer chair to RW.  Will reinforce as needed.    Patient left right sidelying with all lines intact, call button in reach, bed alarm on, and RN notified    GOALS:   Multidisciplinary Problems       Occupational Therapy Goals          Problem: Occupational Therapy    Goal Priority Disciplines Outcome Interventions   Occupational Therapy Goal     OT, PT/OT Ongoing, Progressing    Description: Goals to be met by: 9/3/22    Patient will increase functional independence with ADLs by performing:    LB dressing at SBA.  BSC/toilet transfer at mod I  UBD at Independent level  Grooming in standing at Independent level     MET GOALS:   BSC/toilet transfers at SBA. MET 8/31/2022   ToIleting at SBA.  MET 8/31/2022   UB dressing at SBA.  MET 8/31/2022   Grooming sitting EOB at SBA.  MET 8/31/2022 in standing  Toileting at Independent level. MET 9/8/22                       Time Tracking:     OT Date of Treatment: 09/09/22  OT Start Time: 1329  OT Stop Time: 1350  OT Total Time (min): 21 min    Billable Minutes:Self Care/Home Management 21    OT/FROILAN: OT          9/9/2022
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
## 2022-09-09 NOTE — ASSESSMENT & PLAN NOTE
Discussed with Dr. Yohannes Cordova (Cardiology).  Patient has difficulty tolerating goal-directed therapy for heart failure due to hypertension and also reports side effects to numerous medications in the past.  Patient counseled importance of adhering to medical therapy as much as possible to improve her chances of improving her cardiac function and decreasing her mortality.  Discontinued midodrine 9/8.  Blood pressure okay.  Started low dose metoprolol this morning 12.5 mg twice daily with plan to slowly titrate.

## 2022-09-09 NOTE — PROGRESS NOTES
"[facility] Medicine  Progress Note    Patient Name: Odette Hart  MRN: [id number]  Patient Class: IP- Inpatient   Admission Date: 8/26/2022  Length of Stay: 14 days  Attending Physician: Reddy Pham MD  Primary Care Provider: Braxton Barragan MD        Subjective:     Principal Problem:Acute combined systolic and diastolic congestive heart failure        HPI:  Per Michael Long, NP:  "The patient is a 66 y.o. female with a past medical history of MI, HLD, CAD, cardiomyopathy, HTN, and CHF who presents for evaluation of shortness of breath onset earlier today. She reports "I've been feeling bad," endorsing associated symptoms of nausea and weakness. She states she has been near vomiting but denies any vomiting episodes. She reports elevated temperature at 99 F last night. She states her current presentations feel similar to her previous hospitalizations for dyspnea. Pt is regularly followed by cardiology. She denies pain elsewhere and other somatic complaints.  BNP elevated to greater than 3000 with pulmonary edema noted on CXR.  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    Started on Lasix for diuresis, but course complicated with hypotension and hyponatremia.  Carvedilol discontinued on 9/3/2022 by Cardiology service and midodrine added back on 9/4/2022.  SBP a little improved today.  Urine output recorded only as 450ml overnight.  Patient with " chronic hyponatremia with recent baseline of 129 and likely Cardiorenal in origin.  Received Tolvaptan x 2 and now increased to TIW.  Followed by Nephrology.  Na stable. Patient open to skilled nursing placement.  Working on placement.      Interval History: No acute events. Shortness of breath improved.     Review of Systems   Constitutional:  Positive for fatigue. Negative for chills and fever.   Respiratory:  Positive for shortness of breath.    Cardiovascular:  Negative for chest pain.   Gastrointestinal:  Negative for abdominal pain, nausea and vomiting.   Genitourinary:  Negative for dysuria and frequency.     Objective:     Vital Signs (Most Recent):  Temp: 98 °F (36.7 °C) (09/09/22 1157)  Pulse: 82 (09/09/22 1200)  Resp: 20 (09/09/22 1157)  BP: (!) 109/55 (09/09/22 1157)  SpO2: 97 % (09/09/22 1157)   Vital Signs (24h Range):  Temp:  [97.4 °F (36.3 °C)-98.3 °F (36.8 °C)] 98 °F (36.7 °C)  Pulse:  [74-96] 82  Resp:  [16-20] 20  SpO2:  [93 %-97 %] 97 %  BP: ()/(41-57) 109/55     Weight: 53.7 kg (118 lb 6.2 oz)  Body mass index is 20.97 kg/m².    Intake/Output Summary (Last 24 hours) at 9/9/2022 1350  Last data filed at 9/8/2022 1700  Gross per 24 hour   Intake --   Output 500 ml   Net -500 ml        Physical Exam  Constitutional:       General: She is not in acute distress.  HENT:      Head: Atraumatic.   Eyes:      Conjunctiva/sclera: Conjunctivae normal.   Cardiovascular:      Rate and Rhythm: Normal rate and regular rhythm.      Heart sounds: Murmur heard.   Pulmonary:      Effort: Pulmonary effort is normal.      Breath sounds: Normal breath sounds. No wheezing.   Abdominal:      General: Bowel sounds are normal. There is no distension.      Palpations: Abdomen is soft.      Tenderness: There is no abdominal tenderness.      Comments: Colostomy with stool in bag.   Musculoskeletal:         General: No deformity. Normal range of motion.      Cervical back: Neck supple.   Neurological:      Mental  Status: She is alert and oriented to person, place, and time.       Significant Labs: All pertinent labs within the past 24 hours have been reviewed.    Significant Imaging: I have reviewed all pertinent imaging results/findings within the past 24 hours.        Assessment/Plan:      * Acute combined systolic and diastolic congestive heart failure  Discussed with Dr. Yohannes Cordova (Cardiology).  Patient has difficulty tolerating goal-directed therapy for heart failure due to hypertension and also reports side effects to numerous medications in the past.  Patient counseled importance of adhering to medical therapy as much as possible to improve her chances of improving her cardiac function and decreasing her mortality.  Discontinued midodrine 9/8.  Blood pressure okay.  Started low dose metoprolol this morning 12.5 mg twice daily with plan to slowly titrate.    Encounter for palliative care  Followed by Palliative Care  Patient is Full Code      Debility  Continue with therapy.    Hyponatremia  Chronic with recent baseline of 129 and likely from Cardiorenal in origin.  Received Tolvaptan x 2 and now starting biw.  Followed by Nephrology.  Na stable at 128 today from 130 yesterday    Plan:  Follow up with Nephrology recommendations - Tolvaptan increased to 30mg three times weekly  Follow BMP           Primary hypertension  Continue diuretic.  Hold midodrine.  Hope to introduce low-dose beta-blocker if she can tolerate.    Familial hypercholesterolemia  Has previous Statin intolerance.  -Continue Zetia      Coronary artery disease of native artery of native heart with stable angina pectoris  Continue medical therapy as tolerated.  Patient adamant she cannot tolerate any statin therapy at any dose and declining a trial of statin therapy.      VTE Risk Mitigation (From admission, onward)         Ordered     Place JESS hose  Until discontinued         09/02/22 0910     enoxaparin injection 40 mg  Daily         08/26/22  0325     IP VTE HIGH RISK PATIENT  Once         08/26/22 0325     Place sequential compression device  Until discontinued         08/26/22 0325                Discharge Planning   SIVAN:      Code Status: Full Code   Is the patient medically ready for discharge?:     Reason for patient still in hospital (select all that apply): Treatment and Pending disposition  Discharge Plan A: Skilled Nursing Facility   Discharge Delays: (!) Patient and Family Barriers (Pt not in agreememt with accepting facilities)              Reddy Pham MD  Department of Hospital Medicine   [facility] Surg ([employer])

## 2022-09-09 NOTE — SUBJECTIVE & OBJECTIVE
Interval History: No acute events. Shortness of breath improved.     Review of Systems   Constitutional:  Positive for fatigue. Negative for chills and fever.   Respiratory:  Positive for shortness of breath.    Cardiovascular:  Negative for chest pain.   Gastrointestinal:  Negative for abdominal pain, nausea and vomiting.   Genitourinary:  Negative for dysuria and frequency.     Objective:     Vital Signs (Most Recent):  Temp: 98 °F (36.7 °C) (09/09/22 1157)  Pulse: 82 (09/09/22 1200)  Resp: 20 (09/09/22 1157)  BP: (!) 109/55 (09/09/22 1157)  SpO2: 97 % (09/09/22 1157)   Vital Signs (24h Range):  Temp:  [97.4 °F (36.3 °C)-98.3 °F (36.8 °C)] 98 °F (36.7 °C)  Pulse:  [74-96] 82  Resp:  [16-20] 20  SpO2:  [93 %-97 %] 97 %  BP: ()/(41-57) 109/55     Weight: 53.7 kg (118 lb 6.2 oz)  Body mass index is 20.97 kg/m².    Intake/Output Summary (Last 24 hours) at 9/9/2022 1350  Last data filed at 9/8/2022 1700  Gross per 24 hour   Intake --   Output 500 ml   Net -500 ml        Physical Exam  Constitutional:       General: She is not in acute distress.  HENT:      Head: Atraumatic.   Eyes:      Conjunctiva/sclera: Conjunctivae normal.   Cardiovascular:      Rate and Rhythm: Normal rate and regular rhythm.      Heart sounds: Murmur heard.   Pulmonary:      Effort: Pulmonary effort is normal.      Breath sounds: Normal breath sounds. No wheezing.   Abdominal:      General: Bowel sounds are normal. There is no distension.      Palpations: Abdomen is soft.      Tenderness: There is no abdominal tenderness.      Comments: Colostomy with stool in bag.   Musculoskeletal:         General: No deformity. Normal range of motion.      Cervical back: Neck supple.   Neurological:      Mental Status: She is alert and oriented to person, place, and time.       Significant Labs: All pertinent labs within the past 24 hours have been reviewed.    Significant Imaging: I have reviewed all pertinent imaging results/findings within the past 24  hours.

## 2022-09-09 NOTE — PROGRESS NOTES
[facility] - Med Surg ([place])  Wound Care    Patient Name:  Odette Hart   MRN:  [id number]  Date: 2022  Diagnosis: Acute combined systolic and diastolic congestive heart failure    History:     Past Medical History:   Diagnosis Date    Acute coronary syndrome     Acute exacerbation of chronic obstructive pulmonary disease (COPD) 3/23/2022    Allergy     Arthritis     Cardiomyopathy     CHF (congestive heart failure)     Coronary artery disease     Coronary artery disease of native artery of native heart with stable angina pectoris 2021: [facility]: Cath: LAD: Proximal stent patent. LCX: Proximal 80%. LCX: Dominant. Moderate disease. LCX: HEVER 2.75 x 18 mm. Distal dissection. HEVER 2.75 x 22 mm.     Diverticulitis     Diverticulosis     Familial hypercholesterolemia 2022    Former smoker 2022    Heart attack     Heart disease     History of myocardial infarction 2022    Hyperlipidemia     Hypertension     Hypertension 2022    ICD (implantable cardioverter-defibrillator) in place     Non-ST elevation myocardial infarction (NSTEMI) 2019       Social History     Socioeconomic History    Marital status:    Tobacco Use    Smoking status: Former     Packs/day: 0.50     Types: Cigarettes     Start date: 1976     Quit date: 2012     Years since quittin.7    Smokeless tobacco: Never   Substance and Sexual Activity    Alcohol use: No    Drug use: No     Social Determinants of Health     Financial Resource Strain: Low Risk     Difficulty of Paying Living Expenses: Not hard at all   Food Insecurity: No Food Insecurity    Worried About Running Out of Food in the Last Year: Never true    Ran Out of Food in the Last Year: Never true   Transportation Needs: No Transportation Needs    Lack of Transportation (Medical): No    Lack of Transportation (Non-Medical): No   Physical Activity: Inactive    Days of Exercise per Week: 0 days    Minutes of Exercise per Session: 0 min    Stress: No Stress Concern Present    Feeling of Stress : Not at all   Social Connections: Socially Isolated    Frequency of Communication with Friends and Family: More than three times a week    Frequency of Social Gatherings with Friends and Family: Once a week    Attends Muslim Services: Never    Active Member of Clubs or Organizations: No    Attends Club or Organization Meetings: Never    Marital Status:    Housing Stability: Low Risk     Unable to Pay for Housing in the Last Year: No    Number of Places Lived in the Last Year: 1    Unstable Housing in the Last Year: No       Precautions:     Allergies as of 08/26/2022 - Reviewed 08/26/2022   Allergen Reaction Noted    Augmentin [amoxicillin-pot clavulanate] Swelling 02/19/2018    Lisinopril Other (See Comments) 02/19/2018    Losartan Other (See Comments) 02/19/2018    Shellfish containing products Anaphylaxis 09/10/2018    Levaquin [levofloxacin] Other (See Comments) 09/18/2018    Clindamycin Palpitations 01/21/2019       [facility] Assessment Details/Treatment     Wound care follow up:     Met with patient this morning. Notified by nursing that ostomy pouch leaked into abdominal wound. Patient was cleaned by nursing staff. Irrigated wounds to abdomen and sacrum with Vashe. Packed wounds with Aquacel Ag hydrofiber and covered with Mepilex foam dressing. Applied new ostomy pouch to colostomy site.Patient tolerated dressing changes well. Notified nursing. Nursing to maintain pressure injury prevention interventions. Low air loss bed in use. Wound care to continue to assist with pt prn.        09/09/22 1153        Altered Skin Integrity 05/09/22 1630 Sacral spine Full thickness tissue loss. Base is covered by slough and/or eschar in the wound bed   Date First Assessed/Time First Assessed: 05/09/22 1630   Altered Skin Integrity Present on Admission: yes  Location: Sacral spine  Description of Altered Skin Integrity: Full thickness tissue loss. Base is covered  by slough and/or eschar in the wound bed   Wound Image    Description of Altered Skin Integrity Full thickness tissue loss with exposed bone, tendon, or muscle. Often includes undermining and tunneling. May extend into muscle and/or supporting structures.   Dressing Appearance Clean;Intact;Moist drainage   Drainage Amount Small   Drainage Characteristics/Odor Serous;Creamy;Tan;No odor   Appearance Red;Pink;Moist   Tissue loss description Full thickness   Periwound Area Intact;Scar tissue;Pink   Wound Edges Open   Wound Length (cm) 0.9 cm   Wound Width (cm) 0.9 cm   Wound Depth (cm) 0.8 cm   Wound Volume (cm^3) 0.648 cm^3   Wound Surface Area (cm^2) 0.81 cm^2   Care Cleansed with:;Antimicrobial agent   Dressing Applied;Silicone;Foam   Packing packed with;hydrofiber/alginate  (Aquacel Ag)        Incision/Site 05/09/22 1135 Abdomen   Date First Assessed/Time First Assessed: 05/09/22 1135   Present Prior to Hospital Arrival?: Yes  Location: Abdomen   Wound Image    Incision WDL ex   Dressing Appearance Intact;Moist drainage   Drainage Amount Moderate   Drainage Characteristics/Odor Creamy;Serous;No odor   Appearance Pink;Red;Moist   Wound Edges Open   Wound Depth (cm) 1.5 cm   Undermining (depth (cm)/location) 1.5 cm @ 3 oclock and 2 cm @ 6 oclock   Care Cleansed with:;Antimicrobial agent   Dressing Applied;Silicone;Foam   Packing packed with;hydrofiber/alginate  (aquacel ag)   Periwound Care Dry periwound area maintained;Skin barrier film applied        Colostomy 05/09/22 1100 Descending/sigmoid LLQ   Placement Date/Time: 05/09/22 1100   Inserted by: MD  Colostomy Type: Descending/sigmoid  Location: LLQ   Stomal Appliance 1 piece   Stoma Appearance round;rosebud appearance;pink;protruding above skin level   Stoma Size (in) 30   Site Assessment Clean;Intact   Peristomal Assessment Clean;Intact   Accessories/Skin Care cleansed w/ water;barrier substance over peristomal skin;skin barrier ring   Stoma Function flatus;stool    Treatment Bag change   Tolerance no signs/symptoms of discomfort       09/09/2022

## 2022-09-09 NOTE — PLAN OF CARE
POC reviewed. No significant events this shift. Cardiac monitor maintained.  Complaints of pain treated with PRN pain medication according to MAR. Pt voids and shifts in bed independently. Bed low and locked, side rails up x3, call light within reach. Safety maintained throughout this shift.     Problem: Adult Inpatient Plan of Care  Goal: Plan of Care Review  Outcome: Ongoing, Progressing  Goal: Patient-Specific Goal (Individualized)  Outcome: Ongoing, Progressing  Goal: Absence of Hospital-Acquired Illness or Injury  Outcome: Ongoing, Progressing  Goal: Optimal Comfort and Wellbeing  Outcome: Ongoing, Progressing  Goal: Readiness for Transition of Care  Outcome: Ongoing, Progressing     Problem: Impaired Wound Healing  Goal: Optimal Wound Healing  Outcome: Ongoing, Progressing     Problem: Skin Injury Risk Increased  Goal: Skin Health and Integrity  Outcome: Ongoing, Progressing     Problem: Fall Injury Risk  Goal: Absence of Fall and Fall-Related Injury  Outcome: Ongoing, Progressing     Problem: Coping Ineffective  Goal: Effective Coping  Outcome: Ongoing, Progressing

## 2022-09-09 NOTE — PLAN OF CARE
Problem: Occupational Therapy  Goal: Occupational Therapy Goal  Description: Goals to be met by: 9/3/22    Patient will increase functional independence with ADLs by performing:    LB dressing at SBA.  BSC/toilet transfer at mod I  UBD at Independent level  Grooming in standing at Independent level     MET GOALS:   BSC/toilet transfers at SBA. MET 8/31/2022   ToIleting at SBA.  MET 8/31/2022   UB dressing at SBA.  MET 8/31/2022   Grooming sitting EOB at SBA.  MET 8/31/2022 in standing  Toileting at Independent level. MET 9/8/22  Outcome: Ongoing, Progressing  Patient requested to participate in mobility for household management in addition to ADL tasks.  Numerous standing rest breaks required during mobility, with O2 sats remaining in the upper 90%.  Tolerated light dressing task well.  Continue OT services to maximize patient functioning.

## 2022-09-09 NOTE — PLAN OF CARE
POC reviewed with patient. AAOx4. VS stable on room air. Tylenol and tramadol for generalized pain. Held 2100 metoprolol d/t very low bp. Notified on call PA. No c/o cp, sob, dizziness. No injuries, falls, or trauma occurred during shift, bed alarm set. Purposeful rounding completed. Bed low and locked with side rails up x 3 and call light within reach. Will continue to monitor.

## 2022-09-10 NOTE — PROGRESS NOTES
"[facility] Medicine  Progress Note    Patient Name: Odette Hart  MRN: [id number]  Patient Class: IP- Inpatient   Admission Date: 8/26/2022  Length of Stay: 15 days  Attending Physician: Reddy Pham MD  Primary Care Provider: Braxton Barragan MD        Subjective:     Principal Problem:Acute combined systolic and diastolic congestive heart failure        HPI:  Per Michael Long, NP:  "The patient is a 66 y.o. female with a past medical history of MI, HLD, CAD, cardiomyopathy, HTN, and CHF who presents for evaluation of shortness of breath onset earlier today. She reports "I've been feeling bad," endorsing associated symptoms of nausea and weakness. She states she has been near vomiting but denies any vomiting episodes. She reports elevated temperature at 99 F last night. She states her current presentations feel similar to her previous hospitalizations for dyspnea. Pt is regularly followed by cardiology. She denies pain elsewhere and other somatic complaints.  BNP elevated to greater than 3000 with pulmonary edema noted on CXR.  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    Started on Lasix for diuresis, but course complicated with hypotension and hyponatremia.  Carvedilol discontinued on 9/3/2022 by Cardiology service and midodrine added back on 9/4/2022.  SBP a little improved today.  Urine output recorded only as 450ml overnight.  Patient with " chronic hyponatremia with recent baseline of 129 and likely Cardiorenal in origin.  Received Tolvaptan x 2 and now increased to TIW.  Followed by Nephrology.  Na stable. Patient open to skilled nursing placement.  Working on placement.      Interval History: No acute events. Shortness of breath improved.     Review of Systems   Constitutional:  Positive for fatigue. Negative for chills and fever.   Cardiovascular:  Negative for chest pain.   Gastrointestinal:  Negative for abdominal pain, nausea and vomiting.   Genitourinary:  Negative for dysuria and frequency.     Objective:     Vital Signs (Most Recent):  Temp: 97.5 °F (36.4 °C) (09/10/22 1216)  Pulse: 82 (09/10/22 1216)  Resp: 16 (09/10/22 1216)  BP: (!) 87/47 (09/10/22 1216)  SpO2: 99 % (09/10/22 1216)   Vital Signs (24h Range):  Temp:  [97.4 °F (36.3 °C)-98.8 °F (37.1 °C)] 97.5 °F (36.4 °C)  Pulse:  [75-91] 82  Resp:  [16-20] 16  SpO2:  [96 %-100 %] 99 %  BP: (79-94)/(39-56) 87/47     Weight: 53.7 kg (118 lb 6.2 oz)  Body mass index is 20.97 kg/m².    Intake/Output Summary (Last 24 hours) at 9/10/2022 1252  Last data filed at 9/9/2022 1839  Gross per 24 hour   Intake 650 ml   Output 1200 ml   Net -550 ml        Physical Exam  Constitutional:       General: She is not in acute distress.  HENT:      Head: Atraumatic.   Eyes:      Conjunctiva/sclera: Conjunctivae normal.   Cardiovascular:      Rate and Rhythm: Normal rate and regular rhythm.      Heart sounds: Murmur heard.   Pulmonary:      Effort: Pulmonary effort is normal.      Breath sounds: Normal breath sounds. No wheezing.   Abdominal:      General: Bowel sounds are normal. There is no distension.      Palpations: Abdomen is soft.      Tenderness: There is no abdominal tenderness.      Comments: Colostomy with stool in bag.   Musculoskeletal:         General: No deformity. Normal range of motion.      Cervical back: Neck supple.   Neurological:      Mental Status: She is alert and oriented to person,  place, and time.       Significant Labs: All pertinent labs within the past 24 hours have been reviewed.    Significant Imaging: I have reviewed all pertinent imaging results/findings within the past 24 hours.        Assessment/Plan:      * Acute combined systolic and diastolic congestive heart failure  Discussed with Dr. Yohannes Cordova (Cardiology).  Patient has difficulty tolerating goal-directed therapy for heart failure due to hypertension and also reports side effects to numerous medications in the past.  Patient counseled importance of adhering to medical therapy as much as possible to improve her chances of improving her cardiac function and decreasing her mortality.  Discontinued midodrine 9/8.  Blood pressure okay.  Started low dose metoprolol this morning 12.5 mg twice daily with plan to slowly titrate.    Encounter for palliative care  Followed by Palliative Care  Patient is Full Code      Debility  Continue with therapy.    Hyponatremia  Chronic with recent baseline of 129 and likely from Cardiorenal in origin.  Received Tolvaptan x 2 and now starting biw.  Followed by Nephrology.  Na stable at 136    Plan:  Follow up with Nephrology recommendations - Tolvaptan increased to 30mg three times weekly  Follow BMP           Primary hypertension  Metoprolol resumed     Familial hypercholesterolemia  Has previous Statin intolerance.  -Continue Zetia      Coronary artery disease of native artery of native heart with stable angina pectoris  Continue medical therapy as tolerated.  Patient adamant she cannot tolerate any statin therapy at any dose and declining a trial of statin therapy.      VTE Risk Mitigation (From admission, onward)         Ordered     Place JESS hose  Until discontinued         09/02/22 0910     enoxaparin injection 40 mg  Daily         08/26/22 0325     IP VTE HIGH RISK PATIENT  Once         08/26/22 0325     Place sequential compression device  Until discontinued         08/26/22 0325                 Discharge Planning   SIVAN:      Code Status: Full Code   Is the patient medically ready for discharge?:     Reason for patient still in hospital (select all that apply): Pending disposition  Discharge Plan A: Skilled Nursing Facility   Discharge Delays: (!) Patient and Family Barriers (Pt not in agreememt with accepting facilities)              Reddy Pham MD  Department of Hospital Medicine   [facility] - Med Surg ([place])

## 2022-09-10 NOTE — ASSESSMENT & PLAN NOTE
Chronic with recent baseline of 129 and likely from Cardiorenal in origin.  Received Tolvaptan x 2 and now starting biw.  Followed by Nephrology.  Na stable at 136    Plan:  Follow up with Nephrology recommendations - Tolvaptan increased to 30mg three times weekly  Follow BMP

## 2022-09-10 NOTE — PLAN OF CARE
Problem: Adult Inpatient Plan of Care  Goal: Plan of Care Review  Outcome: Ongoing, Progressing  Goal: Patient-Specific Goal (Individualized)  Outcome: Ongoing, Progressing  Goal: Absence of Hospital-Acquired Illness or Injury  Outcome: Ongoing, Progressing  Goal: Optimal Comfort and Wellbeing  Outcome: Ongoing, Progressing  Goal: Readiness for Transition of Care  Outcome: Ongoing, Progressing     Problem: Impaired Wound Healing  Goal: Optimal Wound Healing  Outcome: Ongoing, Progressing     Problem: Skin Injury Risk Increased  Goal: Skin Health and Integrity  Outcome: Ongoing, Progressing     Problem: Fall Injury Risk  Goal: Absence of Fall and Fall-Related Injury  Outcome: Ongoing, Progressing     Problem: Coping Ineffective  Goal: Effective Coping  Outcome: Ongoing, Progressing   Patient compliant w/CP

## 2022-09-10 NOTE — SUBJECTIVE & OBJECTIVE
Interval History: No acute events. Shortness of breath improved.     Review of Systems   Constitutional:  Positive for fatigue. Negative for chills and fever.   Cardiovascular:  Negative for chest pain.   Gastrointestinal:  Negative for abdominal pain, nausea and vomiting.   Genitourinary:  Negative for dysuria and frequency.     Objective:     Vital Signs (Most Recent):  Temp: 97.5 °F (36.4 °C) (09/10/22 1216)  Pulse: 82 (09/10/22 1216)  Resp: 16 (09/10/22 1216)  BP: (!) 87/47 (09/10/22 1216)  SpO2: 99 % (09/10/22 1216)   Vital Signs (24h Range):  Temp:  [97.4 °F (36.3 °C)-98.8 °F (37.1 °C)] 97.5 °F (36.4 °C)  Pulse:  [75-91] 82  Resp:  [16-20] 16  SpO2:  [96 %-100 %] 99 %  BP: (79-94)/(39-56) 87/47     Weight: 53.7 kg (118 lb 6.2 oz)  Body mass index is 20.97 kg/m².    Intake/Output Summary (Last 24 hours) at 9/10/2022 1252  Last data filed at 9/9/2022 1839  Gross per 24 hour   Intake 650 ml   Output 1200 ml   Net -550 ml        Physical Exam  Constitutional:       General: She is not in acute distress.  HENT:      Head: Atraumatic.   Eyes:      Conjunctiva/sclera: Conjunctivae normal.   Cardiovascular:      Rate and Rhythm: Normal rate and regular rhythm.      Heart sounds: Murmur heard.   Pulmonary:      Effort: Pulmonary effort is normal.      Breath sounds: Normal breath sounds. No wheezing.   Abdominal:      General: Bowel sounds are normal. There is no distension.      Palpations: Abdomen is soft.      Tenderness: There is no abdominal tenderness.      Comments: Colostomy with stool in bag.   Musculoskeletal:         General: No deformity. Normal range of motion.      Cervical back: Neck supple.   Neurological:      Mental Status: She is alert and oriented to person, place, and time.       Significant Labs: All pertinent labs within the past 24 hours have been reviewed.    Significant Imaging: I have reviewed all pertinent imaging results/findings within the past 24 hours.

## 2022-09-10 NOTE — PLAN OF CARE
POC reviewed with patient. AAOx4. VS stable on room air.  No c/o cp, sob, dizziness. No injuries, falls, or trauma occurred during shift, bed alarm set. Purposeful rounding completed. Dsg at sacrum clean dry intact. Dsg at abdomen, clean, dry, intact. Ostomy intact. Bed low and locked with side rails up x 3 and call light within reach. Will continue to monitor.

## 2022-09-10 NOTE — PROGRESS NOTES
[facility]    Admission date:  8/26/2022     Assessment    Acute on chronic systolic heart failure   Volume status looks good on oral therapy    Coronary atherosclerosis   Stable and free of angina    Plan and Discussion    Cardiac status is stable on oral regimen.  Awaiting transition to next level of care.    Subjective    Eating poorly.  No acute issues.    Medications  Current Facility-Administered Medications   Medication Dose Route Frequency Provider Last Rate Last Admin    acetaminophen tablet 650 mg  650 mg Oral Q6H PRN Michael Long NP   650 mg at 09/08/22 2130    ALPRAZolam tablet 0.5 mg  0.5 mg Oral Nightly PRN Michael Long NP   0.5 mg at 09/07/22 2317    aspirin EC tablet 81 mg  81 mg Oral Daily Michael Long NP   81 mg at 09/09/22 0849    clopidogreL tablet 75 mg  75 mg Oral Daily Michael Long NP   75 mg at 09/09/22 0850    docusate sodium capsule 100 mg  100 mg Oral BID Michael Long NP   100 mg at 09/09/22 2101    enoxaparin injection 40 mg  40 mg Subcutaneous Daily Michael Long NP   40 mg at 09/09/22 1752    ezetimibe tablet 10 mg  10 mg Oral QHS Michael Long NP   10 mg at 09/09/22 2101    famotidine tablet 20 mg  20 mg Oral Daily Joseph Bean NP   20 mg at 09/09/22 0851    furosemide tablet 40 mg  40 mg Oral BID Nghia Hunt MD   40 mg at 09/09/22 1751    magnesium oxide tablet 400 mg  400 mg Oral Daily Joseph Bean NP   400 mg at 09/09/22 0851    metoprolol succinate (TOPROL-XL) 24 hr tablet 25 mg  25 mg Oral Daily Yohannes Cordova MD        ondansetron disintegrating tablet 4 mg  4 mg Oral Q8H PRN Michael Long NP   4 mg at 09/02/22 0013    polyethylene glycol packet 17 g  17 g Oral Daily PRN Marco Antonio Alvarado MD   17 g at 09/03/22 1355    simethicone chewable tablet 160 mg  2 tablet Oral Q8H PRN Chelsea Meza PA-C   160 mg at 09/07/22 2052    sodium chloride 0.9% flush 10 mL  10 mL Intravenous PRN Michael BELL  WINNIE Long        tolvaptan tablet 30 mg  30 mg Oral Every Tues, Thurs Joseph Bean, WINNIE   30 mg at 22 0938    traMADoL tablet 50 mg  50 mg Oral Q6H PRN Michael RANDOLPHLuciano Long, WINNIE   50 mg at 09/10/22 0659        Physical Exam    Temp:  [97.4 °F (36.3 °C)-98.8 °F (37.1 °C)]   Pulse:  [75-91]   Resp:  [18-20]   BP: ()/(39-56)   SpO2:  [96 %-100 %]    Wt Readings from Last 3 Encounters:   22 53.7 kg (118 lb 6.2 oz)   22 50.3 kg (111 lb)   22 50.6 kg (111 lb 8 oz)     Physical Exam  Constitutional:       General: She is not in acute distress.  Neck:      Vascular: No hepatojugular reflux or JVD.   Cardiovascular:      Rate and Rhythm: Normal rate and regular rhythm.      Heart sounds: S1 normal and S2 normal. No murmur heard.    Gallop present. S3 and S4 sounds present.   Pulmonary:      Effort: Pulmonary effort is normal.      Breath sounds: Normal breath sounds and air entry.   Abdominal:      General: Bowel sounds are normal.      Palpations: Abdomen is soft. There is no hepatomegaly.   Musculoskeletal:      Right lower le+ Edema present.      Left lower le+ Edema present.   Skin:     Coloration: Skin is not pale.   Neurological:      Mental Status: She is alert.   Psychiatric:         Behavior: Behavior is cooperative.       Telemetry  Sinus    Labs  Recent Results (from the past 24 hour(s))   CBC Auto Differential    Collection Time: 09/10/22  4:37 AM   Result Value Ref Range    WBC 4.26 3.90 - 12.70 K/uL    RBC 4.13 4.00 - 5.40 M/uL    Hemoglobin 10.9 (L) 12.0 - 16.0 g/dL    Hematocrit 35.4 (L) 37.0 - 48.5 %    MCV 86 82 - 98 fL    MCH 26.4 (L) 27.0 - 31.0 pg    MCHC 30.8 (L) 32.0 - 36.0 g/dL    RDW 21.8 (H) 11.5 - 14.5 %    Platelets 279 150 - 450 K/uL    MPV 10.6 9.2 - 12.9 fL    Immature Granulocytes 0.2 0.0 - 0.5 %    Gran # (ANC) 1.1 (L) 1.8 - 7.7 K/uL    Immature Grans (Abs) 0.01 0.00 - 0.04 K/uL    Lymph # 2.4 1.0 - 4.8 K/uL    Mono # 0.8 0.3 - 1.0 K/uL    Eos # 0.0 0.0  - 0.5 K/uL    Baso # 0.04 0.00 - 0.20 K/uL    nRBC 0 0 /100 WBC    Gran % 25.0 (L) 38.0 - 73.0 %    Lymph % 56.1 (H) 18.0 - 48.0 %    Mono % 17.8 (H) 4.0 - 15.0 %    Eosinophil % 0.0 0.0 - 8.0 %    Basophil % 0.9 0.0 - 1.9 %    Differential Method Automated    Comprehensive Metabolic Panel    Collection Time: 09/10/22  4:37 AM   Result Value Ref Range    Sodium 136 136 - 145 mmol/L    Potassium 3.7 3.5 - 5.1 mmol/L    Chloride 93 (L) 95 - 110 mmol/L    CO2 33 (H) 23 - 29 mmol/L    Glucose 121 (H) 70 - 110 mg/dL    BUN 10 8 - 23 mg/dL    Creatinine 0.7 0.5 - 1.4 mg/dL    Calcium 8.2 (L) 8.7 - 10.5 mg/dL    Total Protein 7.1 6.0 - 8.4 g/dL    Albumin 1.8 (L) 3.5 - 5.2 g/dL    Total Bilirubin 0.8 0.1 - 1.0 mg/dL    Alkaline Phosphatase 113 55 - 135 U/L    AST 20 10 - 40 U/L    ALT 11 10 - 44 U/L    Anion Gap 10 8 - 16 mmol/L    eGFR >60 >60 mL/min/1.73 m^2   Magnesium    Collection Time: 09/10/22  4:37 AM   Result Value Ref Range    Magnesium 1.6 1.6 - 2.6 mg/dL   Phosphorus    Collection Time: 09/10/22  4:37 AM   Result Value Ref Range    Phosphorus 2.7 2.7 - 4.5 mg/dL             Vince Lopez MD

## 2022-09-11 NOTE — PROGRESS NOTES
"[facility]  Progress Note    Patient Name: Odette Hart  MRN: [id number]  Patient Class: IP- Inpatient   Admission Date: 8/26/2022  Length of Stay: 16 days  Attending Physician: Reddy Pham MD  Primary Care Provider: Braxton Barragan MD        Subjective:     Principal Problem:Acute combined systolic and diastolic congestive heart failure        HPI:  Per Michael Long, NP:  "The patient is a 66 y.o. female with a past medical history of MI, HLD, CAD, cardiomyopathy, HTN, and CHF who presents for evaluation of shortness of breath onset earlier today. She reports "I've been feeling bad," endorsing associated symptoms of nausea and weakness. She states she has been near vomiting but denies any vomiting episodes. She reports elevated temperature at 99 F last night. She states her current presentations feel similar to her previous hospitalizations for dyspnea. Pt is regularly followed by cardiology. She denies pain elsewhere and other somatic complaints.  BNP elevated to greater than 3000 with pulmonary edema noted on CXR.  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    Started on Lasix for diuresis, but course complicated with hypotension and hyponatremia.  Carvedilol discontinued on 9/3/2022 by Cardiology service and midodrine added back on 9/4/2022.  SBP a little improved today.  Urine output recorded only as 450ml overnight.  Patient with " chronic hyponatremia with recent baseline of 129 and likely Cardiorenal in origin.  Received Tolvaptan x 2 and now increased to TIW.  Followed by Nephrology.  Na stable. Patient open to skilled nursing placement.  Working on placement.      Interval History: No acute events. Pending placement     Review of Systems   Constitutional:  Positive for fatigue. Negative for chills and fever.   Cardiovascular:  Negative for chest pain.   Gastrointestinal:  Negative for abdominal pain, nausea and vomiting.   Genitourinary:  Negative for dysuria and frequency.     Objective:     Vital Signs (Most Recent):  Temp: 97.8 °F (36.6 °C) (09/11/22 0921)  Pulse: 84 (09/11/22 1000)  Resp: 16 (09/11/22 0921)  BP: (!) 108/58 (09/11/22 0922)  SpO2: 96 % (09/11/22 0921)   Vital Signs (24h Range):  Temp:  [97.5 °F (36.4 °C)-98 °F (36.7 °C)] 97.8 °F (36.6 °C)  Pulse:  [] 84  Resp:  [16-20] 16  SpO2:  [96 %-99 %] 96 %  BP: ()/(47-58) 108/58     Weight: 53.7 kg (118 lb 6.2 oz)  Body mass index is 20.97 kg/m².    Intake/Output Summary (Last 24 hours) at 9/11/2022 1123  Last data filed at 9/11/2022 0800  Gross per 24 hour   Intake 255 ml   Output 500 ml   Net -245 ml        Physical Exam  Constitutional:       General: She is not in acute distress.  HENT:      Head: Atraumatic.   Eyes:      Conjunctiva/sclera: Conjunctivae normal.   Cardiovascular:      Rate and Rhythm: Normal rate and regular rhythm.      Heart sounds: Murmur heard.   Pulmonary:      Effort: Pulmonary effort is normal.      Breath sounds: Normal breath sounds. No wheezing.   Abdominal:      General: Bowel sounds are normal. There is no distension.      Palpations: Abdomen is soft.      Tenderness: There is no abdominal tenderness.      Comments: Colostomy with stool in bag.   Musculoskeletal:         General: No deformity. Normal range of motion.      Cervical back: Neck supple.   Neurological:      Mental Status: She is alert and oriented to person, place, and  time.       Significant Labs: All pertinent labs within the past 24 hours have been reviewed.    Significant Imaging: I have reviewed all pertinent imaging results/findings within the past 24 hours.        Assessment/Plan:      * Acute combined systolic and diastolic congestive heart failure  Discussed with Dr. Yohannes Cordova (Cardiology).  Patient has difficulty tolerating goal-directed therapy for heart failure due to hypertension and also reports side effects to numerous medications in the past.  Patient counseled importance of adhering to medical therapy as much as possible to improve her chances of improving her cardiac function and decreasing her mortality.  Discontinued midodrine 9/8.  Blood pressure okay.  Started low dose metoprolol this morning 12.5 mg twice daily with plan to slowly titrate.    Encounter for palliative care  Followed by Palliative Care  Patient is Full Code      Debility  Continue with therapy.    Hyponatremia  Chronic with recent baseline of 129 and likely from Cardiorenal in origin.  Received Tolvaptan x 2 and now starting biw.  Followed by Nephrology.  Na stable at 133    Plan:  Follow up with Nephrology recommendations - Tolvaptan increased to 30mg three times weekly  Follow BMP           Primary hypertension  Metoprolol resumed     Familial hypercholesterolemia  Has previous Statin intolerance.  -Continue Zetia      Coronary artery disease of native artery of native heart with stable angina pectoris  Continue medical therapy as tolerated.  Patient adamant she cannot tolerate any statin therapy at any dose and declining a trial of statin therapy.      VTE Risk Mitigation (From admission, onward)         Ordered     Place JESS hose  Until discontinued         09/02/22 0910     enoxaparin injection 40 mg  Daily         08/26/22 0325     IP VTE HIGH RISK PATIENT  Once         08/26/22 0325     Place sequential compression device  Until discontinued         08/26/22 0325                 Discharge Planning   SIVAN:      Code Status: Full Code   Is the patient medically ready for discharge?:     Reason for patient still in hospital (select all that apply): Pending disposition  Discharge Plan A: Skilled Nursing Facility   Discharge Delays: (!) Patient and Family Barriers (Pt not in agreememt with accepting facilities)              Reddy Pham MD  Department of Hospital Medicine   [facility] ([place])

## 2022-09-11 NOTE — PLAN OF CARE
Pt remained free of falls and injuries. AAOx4. Pt anxious intermittently throughout shift, PRN Xanax given. Pt calm and cooperative this AM. Purposeful hourly rounding performed. Pt swallows meds whole. IV flushed and saline locked. Managed c/o generalized hip pain with PRN tramadol. Patient ambulates with standby assist using walker. Strict I's and O's documented in flowsheet. Frequent weight shift encouraged. VSS on room air. Pt resting in bed, NADN, denies needs at this time. Bed low and locked, bed alarm on, call light in reach. Side rails up x2. Report given to JODIE Mullins.

## 2022-09-11 NOTE — ASSESSMENT & PLAN NOTE
Chronic with recent baseline of 129 and likely from Cardiorenal in origin.  Received Tolvaptan x 2 and now starting biw.  Followed by Nephrology.  Na stable at 133    Plan:  Follow up with Nephrology recommendations - Tolvaptan increased to 30mg three times weekly  Follow BMP

## 2022-09-11 NOTE — SUBJECTIVE & OBJECTIVE
Interval History: No acute events. Pending placement     Review of Systems   Constitutional:  Positive for fatigue. Negative for chills and fever.   Cardiovascular:  Negative for chest pain.   Gastrointestinal:  Negative for abdominal pain, nausea and vomiting.   Genitourinary:  Negative for dysuria and frequency.     Objective:     Vital Signs (Most Recent):  Temp: 97.8 °F (36.6 °C) (09/11/22 0921)  Pulse: 84 (09/11/22 1000)  Resp: 16 (09/11/22 0921)  BP: (!) 108/58 (09/11/22 0922)  SpO2: 96 % (09/11/22 0921)   Vital Signs (24h Range):  Temp:  [97.5 °F (36.4 °C)-98 °F (36.7 °C)] 97.8 °F (36.6 °C)  Pulse:  [] 84  Resp:  [16-20] 16  SpO2:  [96 %-99 %] 96 %  BP: ()/(47-58) 108/58     Weight: 53.7 kg (118 lb 6.2 oz)  Body mass index is 20.97 kg/m².    Intake/Output Summary (Last 24 hours) at 9/11/2022 1123  Last data filed at 9/11/2022 0800  Gross per 24 hour   Intake 255 ml   Output 500 ml   Net -245 ml        Physical Exam  Constitutional:       General: She is not in acute distress.  HENT:      Head: Atraumatic.   Eyes:      Conjunctiva/sclera: Conjunctivae normal.   Cardiovascular:      Rate and Rhythm: Normal rate and regular rhythm.      Heart sounds: Murmur heard.   Pulmonary:      Effort: Pulmonary effort is normal.      Breath sounds: Normal breath sounds. No wheezing.   Abdominal:      General: Bowel sounds are normal. There is no distension.      Palpations: Abdomen is soft.      Tenderness: There is no abdominal tenderness.      Comments: Colostomy with stool in bag.   Musculoskeletal:         General: No deformity. Normal range of motion.      Cervical back: Neck supple.   Neurological:      Mental Status: She is alert and oriented to person, place, and time.       Significant Labs: All pertinent labs within the past 24 hours have been reviewed.    Significant Imaging: I have reviewed all pertinent imaging results/findings within the past 24 hours.

## 2022-09-11 NOTE — PROGRESS NOTES
[facility]    Admission date:  8/26/2022     Assessment    Acute on chronic systolic heart failure   Remains compensated    Coronary atherosclerosis   Stable and free of angina    Plan and Discussion    No change to present management    Subjective    No complaints.  Supine in bed and breathing comfortably with no supplemental oxygen.    Medications  Current Facility-Administered Medications   Medication Dose Route Frequency Provider Last Rate Last Admin    acetaminophen tablet 650 mg  650 mg Oral Q6H PRN Michael Long NP   650 mg at 09/08/22 2130    ALPRAZolam tablet 0.5 mg  0.5 mg Oral Nightly PRN Michael Long NP   0.5 mg at 09/10/22 2158    aspirin EC tablet 81 mg  81 mg Oral Daily Michael Long NP   81 mg at 09/11/22 0922    clopidogreL tablet 75 mg  75 mg Oral Daily Michael Long NP   75 mg at 09/11/22 0922    docusate sodium capsule 100 mg  100 mg Oral BID Michael Long NP   100 mg at 09/11/22 0922    enoxaparin injection 40 mg  40 mg Subcutaneous Daily Michael Long NP   40 mg at 09/09/22 1752    ezetimibe tablet 10 mg  10 mg Oral QHS Michael Long NP   10 mg at 09/10/22 2158    famotidine tablet 20 mg  20 mg Oral Daily Joseph Bean NP   20 mg at 09/11/22 0922    furosemide tablet 40 mg  40 mg Oral BID Nghia Hunt MD   40 mg at 09/11/22 0922    magnesium oxide tablet 400 mg  400 mg Oral Daily Joseph Bean NP   400 mg at 09/11/22 0922    metoprolol succinate (TOPROL-XL) 24 hr tablet 25 mg  25 mg Oral Daily Vince Lopez MD   25 mg at 09/11/22 0922    ondansetron disintegrating tablet 4 mg  4 mg Oral Q8H PRN Michael Long NP   4 mg at 09/02/22 0013    polyethylene glycol packet 17 g  17 g Oral Daily PRN Marco Antonio Alvarado MD   17 g at 09/03/22 1355    simethicone chewable tablet 160 mg  2 tablet Oral Q8H PRN Chelsea Meza PA-C   160 mg at 09/10/22 1749    sodium chloride 0.9% flush 10 mL  10 mL Intravenous PRN Michael BELL  WINNIE Long        tolvaptan tablet 30 mg  30 mg Oral Every Tues, Thurs Joseph Bean, WINNIE   30 mg at 22 0938    traMADoL tablet 50 mg  50 mg Oral Q6H PRN Michael RANDOLPHLuciano Long, NP   50 mg at 22 0311        Physical Exam    Temp:  [97.5 °F (36.4 °C)-98 °F (36.7 °C)]   Pulse:  []   Resp:  [16-20]   BP: ()/(47-58)   SpO2:  [96 %-99 %]    Wt Readings from Last 3 Encounters:   22 53.7 kg (118 lb 6.2 oz)   22 50.3 kg (111 lb)   22 50.6 kg (111 lb 8 oz)     Physical Exam  Constitutional:       General: She is not in acute distress.  Neck:      Vascular: No hepatojugular reflux or JVD.   Cardiovascular:      Rate and Rhythm: Normal rate and regular rhythm.      Heart sounds: S1 normal and S2 normal. No murmur heard.    Gallop present. S3 and S4 sounds present.   Pulmonary:      Effort: Pulmonary effort is normal.      Breath sounds: Normal breath sounds and air entry.   Abdominal:      General: Bowel sounds are normal.      Palpations: Abdomen is soft. There is no hepatomegaly.   Musculoskeletal:      Right lower le+ Edema present.      Left lower le+ Edema present.   Skin:     Coloration: Skin is not pale.   Neurological:      Mental Status: She is alert.   Psychiatric:         Behavior: Behavior is cooperative.       Telemetry  Sinus rhythm    Labs  Recent Results (from the past 24 hour(s))   Comprehensive Metabolic Panel    Collection Time: 22  4:22 AM   Result Value Ref Range    Sodium 133 (L) 136 - 145 mmol/L    Potassium 3.8 3.5 - 5.1 mmol/L    Chloride 94 (L) 95 - 110 mmol/L    CO2 31 (H) 23 - 29 mmol/L    Glucose 79 70 - 110 mg/dL    BUN 10 8 - 23 mg/dL    Creatinine 0.6 0.5 - 1.4 mg/dL    Calcium 8.1 (L) 8.7 - 10.5 mg/dL    Total Protein 6.6 6.0 - 8.4 g/dL    Albumin 1.6 (L) 3.5 - 5.2 g/dL    Total Bilirubin 0.8 0.1 - 1.0 mg/dL    Alkaline Phosphatase 104 55 - 135 U/L    AST 20 10 - 40 U/L    ALT 12 10 - 44 U/L    Anion Gap 8 8 - 16 mmol/L    eGFR >60 >60  mL/min/1.73 m^2   Magnesium    Collection Time: 09/11/22  4:22 AM   Result Value Ref Range    Magnesium 1.6 1.6 - 2.6 mg/dL   Phosphorus    Collection Time: 09/11/22  4:22 AM   Result Value Ref Range    Phosphorus 2.5 (L) 2.7 - 4.5 mg/dL   CBC Auto Differential    Collection Time: 09/11/22  4:23 AM   Result Value Ref Range    WBC 3.48 (L) 3.90 - 12.70 K/uL    RBC 3.82 (L) 4.00 - 5.40 M/uL    Hemoglobin 10.0 (L) 12.0 - 16.0 g/dL    Hematocrit 32.3 (L) 37.0 - 48.5 %    MCV 85 82 - 98 fL    MCH 26.2 (L) 27.0 - 31.0 pg    MCHC 31.0 (L) 32.0 - 36.0 g/dL    RDW 21.5 (H) 11.5 - 14.5 %    Platelets 268 150 - 450 K/uL    MPV 10.5 9.2 - 12.9 fL    Immature Granulocytes 0.3 0.0 - 0.5 %    Gran # (ANC) 0.7 (L) 1.8 - 7.7 K/uL    Immature Grans (Abs) 0.01 0.00 - 0.04 K/uL    Lymph # 2.2 1.0 - 4.8 K/uL    Mono # 0.5 0.3 - 1.0 K/uL    Eos # 0.0 0.0 - 0.5 K/uL    Baso # 0.04 0.00 - 0.20 K/uL    nRBC 0 0 /100 WBC    Gran % 20.2 (L) 38.0 - 73.0 %    Lymph % 62.9 (H) 18.0 - 48.0 %    Mono % 15.5 (H) 4.0 - 15.0 %    Eosinophil % 0.0 0.0 - 8.0 %    Basophil % 1.1 0.0 - 1.9 %    Platelet Estimate Appears normal     Aniso Slight     Hypo Occasional     Toxic Granulation Present     Smudge Cells Present     Large/Giant Platelets Present     Differential Method Automated              Vince Lopez MD

## 2022-09-12 NOTE — SUBJECTIVE & OBJECTIVE
Interval History: No acute events. Pending placement     Review of Systems   Constitutional:  Positive for fatigue. Negative for chills and fever.   Cardiovascular:  Negative for chest pain.   Gastrointestinal:  Negative for abdominal pain, nausea and vomiting.   Genitourinary:  Negative for dysuria and frequency.     Objective:     Vital Signs (Most Recent):  Temp: 97.7 °F (36.5 °C) (09/12/22 0753)  Pulse: 74 (09/12/22 1000)  Resp: 16 (09/12/22 1029)  BP: (!) 93/50 (09/12/22 1029)  SpO2: 98 % (09/12/22 0753)   Vital Signs (24h Range):  Temp:  [97.6 °F (36.4 °C)-98.2 °F (36.8 °C)] 97.7 °F (36.5 °C)  Pulse:  [] 74  Resp:  [16] 16  SpO2:  [95 %-99 %] 98 %  BP: (88-97)/(49-55) 93/50     Weight: 49.6 kg (109 lb 5.6 oz)  Body mass index is 19.37 kg/m².    Intake/Output Summary (Last 24 hours) at 9/12/2022 1152  Last data filed at 9/12/2022 0521  Gross per 24 hour   Intake 400 ml   Output 1800 ml   Net -1400 ml        Physical Exam  Constitutional:       General: She is not in acute distress.  HENT:      Head: Atraumatic.   Eyes:      Conjunctiva/sclera: Conjunctivae normal.   Cardiovascular:      Rate and Rhythm: Normal rate and regular rhythm.      Heart sounds: Murmur heard.   Pulmonary:      Effort: Pulmonary effort is normal.      Breath sounds: Normal breath sounds. No wheezing.   Abdominal:      General: Bowel sounds are normal. There is no distension.      Palpations: Abdomen is soft.      Tenderness: There is no abdominal tenderness.      Comments: Colostomy with stool in bag.   Musculoskeletal:         General: No deformity. Normal range of motion.      Cervical back: Neck supple.   Neurological:      Mental Status: She is alert and oriented to person, place, and time.       Significant Labs: All pertinent labs within the past 24 hours have been reviewed.    Significant Imaging: I have reviewed all pertinent imaging results/findings within the past 24 hours.

## 2022-09-12 NOTE — PLAN OF CARE
Problem: Occupational Therapy  Goal: Occupational Therapy Goal  Description: Goals to be met by: 9/3/22    Patient will increase functional independence with ADLs by performing:    MET GOALS:  LB dressing at SBA. MET 9/12/22  BSC/toilet transfer at mod I. MET 9/12.22  UBD at Independent level. MET 9/12/22  Grooming in standing at Independent level. MET 9/12/22   BSC/toilet transfers at SBA. MET 8/31/2022   ToIleting at SBA.  MET 8/31/2022   UB dressing at SBA.  MET 8/31/2022   Grooming sitting EOB at SBA.  MET 8/31/2022 in standing  Toileting at Independent level. MET 9/8/22  Outcome: Met     Pt cleared OT. All goals met. No need for acute, skilled OT. Safe to dc home when medically ready.    DC: Home with HH OT/PT   DME: none

## 2022-09-12 NOTE — ASSESSMENT & PLAN NOTE
Dr. Yohannes Cordova (Cardiology) on board.  Patient has difficulty tolerating goal-directed therapy for heart failure due to hypertension and also reports side effects to numerous medications in the past.  Patient counseled importance of adhering to medical therapy as much as possible to improve her chances of improving her cardiac function and decreasing her mortality.  Discontinued midodrine 9/8.  Blood pressure okay.  Currently on losartan, metoprolol and lasix

## 2022-09-12 NOTE — CARE UPDATE
[facility] ([place])      HOME HEALTH ORDERS  FACE TO FACE ENCOUNTER    Patient Name: Odette Hart  YOB: 1955    PCP: Braxton Barragan MD   PCP Address: [address] / [place] [address]  PCP Phone Number: [phone]  PCP Fax: [phone]    Encounter Date: 8/26/22    Admit to Home Health    Diagnoses:  Active Hospital Problems    Diagnosis  POA    *Acute combined systolic and diastolic congestive heart failure [I50.41]  Yes    Encounter for palliative care [Z51.5]  Not Applicable    Debility [R53.81]  Yes    Hyponatremia [E87.1]  Yes    Primary hypertension [I10]  Yes    Familial hypercholesterolemia [E78.01]  Yes    Coronary artery disease of native artery of native heart with stable angina pectoris [I25.118]  Yes     1/24/2022: [facility]: Cath: LAD: Proximal stent patent. LCX: Proximal 80%. LCX: Dominant. Moderate disease. LCX: HEVER 2.75 x 18 mm. Distal dissection. HEVER 2.75 x 22 mm.          Resolved Hospital Problems    Diagnosis Date Resolved POA    Acute on chronic systolic heart failure [I50.23] 09/01/2022 Yes       Follow Up Appointments:  Future Appointments   Date Time Provider Department Center   9/13/2022  1:00 PM LAB, APPOINTMENT [facility] LAB V[facility]   9/13/2022  1:40 PM SIX, MINUTE WALK [facility] PUL WLK [facility]   9/13/2022  2:30 PM Pan Caba MD [facility] HEARTTX [facility]   9/13/2022  3:00 PM HEARTTRANSPLANT, LVADN [facility] HEARTTX [facility]   9/16/2022 10:00 AM Meghann Koroma NP [facility] COLON [facility]   9/16/2022 10:40 AM Rafa Cardozo MD [facility] COLON [facility]   10/11/2022  2:45 PM Braxton Barragan MD [facility]       Allergies:  Review of patient's allergies indicates:   Allergen Reactions    Augmentin [amoxicillin-pot clavulanate] Swelling     Not allergic to amoxicillin just a derivative of augmentin    Lisinopril Other (See Comments)     Fluid around heart    Losartan Other (See Comments)     High potassium    Shellfish containing  products Anaphylaxis     Pt.states she is allergic to SEAFOOD since the age of 13    Levaquin [levofloxacin] Other (See Comments)     Very bad joint pain    Clindamycin Palpitations     Chest pain       Medications: Review discharge medications with patient and family and provide education.    Current Facility-Administered Medications   Medication Dose Route Frequency Provider Last Rate Last Admin    acetaminophen tablet 650 mg  650 mg Oral Q6H PRN Michael Long NP   650 mg at 09/08/22 2130    ALPRAZolam tablet 0.5 mg  0.5 mg Oral Nightly PRN Michael Long NP   0.5 mg at 09/11/22 2244    aspirin EC tablet 81 mg  81 mg Oral Daily Michael Long NP   81 mg at 09/12/22 0934    clopidogreL tablet 75 mg  75 mg Oral Daily Michael Long NP   75 mg at 09/12/22 0934    docusate sodium capsule 100 mg  100 mg Oral BID Michael Long NP   100 mg at 09/12/22 0934    enoxaparin injection 40 mg  40 mg Subcutaneous Daily Michael Long NP   40 mg at 09/09/22 1752    ezetimibe tablet 10 mg  10 mg Oral QHS Michael Long NP   10 mg at 09/11/22 2009    famotidine tablet 20 mg  20 mg Oral Daily Joseph Bean NP   20 mg at 09/12/22 0934    furosemide tablet 40 mg  40 mg Oral BID Nghia Hunt MD   40 mg at 09/12/22 0934    losartan tablet 25 mg  25 mg Oral Daily Yohannes Cordova MD   25 mg at 09/12/22 1029    magnesium oxide tablet 400 mg  400 mg Oral Daily Joseph Bean NP   400 mg at 09/12/22 0934    metoprolol succinate (TOPROL-XL) 24 hr tablet 25 mg  25 mg Oral Daily Vince Lopez MD   25 mg at 09/12/22 0934    ondansetron disintegrating tablet 4 mg  4 mg Oral Q8H PRN Michael Long NP   4 mg at 09/11/22 2018    polyethylene glycol packet 17 g  17 g Oral Daily PRN Marco Antonio Alvarado MD   17 g at 09/03/22 1355    simethicone chewable tablet 160 mg  2 tablet Oral Q8H PRN Chelsea Meza PA-C   160 mg at 09/10/22 1749    sodium chloride 0.9% flush 10 mL  10 mL Intravenous PRN  Michael Long, WININE        tolvaptan tablet 30 mg  30 mg Oral Every Tues, Thurs Joseph Bean NP   30 mg at 09/08/22 0938    traMADoL tablet 50 mg  50 mg Oral Q6H PRN Michael Long NP   50 mg at 09/12/22 1029     Current Discharge Medication List        CONTINUE these medications which have NOT CHANGED    Details   acetaminophen (TYLENOL) 500 MG tablet Take 2 tablets (1,000 mg total) by mouth every 8 (eight) hours as needed for Pain.  Refills: 0      albuterol (PROVENTIL/VENTOLIN HFA) 90 mcg/actuation inhaler Inhale 2 puffs into the lungs every 6 (six) hours as needed for Wheezing. Rescue  Qty: 18 g, Refills: 1    Associated Diagnoses: Bronchitis      ALPRAZolam (XANAX) 0.5 MG tablet Take 1 tablet (0.5 mg total) by mouth nightly as needed for Anxiety.  Qty: 30 tablet, Refills: 1    Associated Diagnoses: Anxiety      aspirin (ECOTRIN) 81 MG EC tablet Take 1 tablet (81 mg total) by mouth once daily.  Qty: 90 tablet, Refills: 0      carvediloL (COREG) 3.125 MG tablet Take 1 tablet (3.125 mg total) by mouth 2 (two) times daily.  Qty: 60 tablet, Refills: 11    Comments: .      clopidogreL (PLAVIX) 75 mg tablet Take 1 tablet (75 mg total) by mouth once daily.  Qty: 30 tablet, Refills: 1      collagenase (SANTYL) ointment Apply topically once daily.  Qty: 90 g, Refills: 1      docusate sodium (COLACE) 100 MG capsule Take 1 capsule (100 mg total) by mouth 2 (two) times daily.  Qty: 60 capsule, Refills: 1      esomeprazole (NEXIUM) 40 MG capsule Take 1 capsule (40 mg total) by mouth before breakfast.  Qty: 30 capsule, Refills: 11    Associated Diagnoses: Epigastric pain      ezetimibe (ZETIA) 10 mg tablet Take 1 tablet (10 mg total) by mouth every evening.  Qty: 90 tablet, Refills: 3      fluticasone propionate (FLONASE) 50 mcg/actuation nasal spray 1 spray (50 mcg total) by Each Nostril route once daily.  Qty: 9.9 mL, Refills: 1    Associated Diagnoses: Sinusitis, unspecified chronicity, unspecified location       food supplemt, lactose-reduced (ENSURE HIGH PROTEIN) Liqd Take 240 mLs by mouth 2 (two) times a day.  Qty: 60 each, Refills: 3    Associated Diagnoses: Weight loss; FTT (failure to thrive) in adult      HYDROcodone-acetaminophen (NORCO) 5-325 mg per tablet Take 1 tablet by mouth every 12 (twelve) hours as needed for Pain.  Qty: 14 tablet, Refills: 0    Comments: Quantity prescribed more than 7 day supply? No  Associated Diagnoses: Sacral decubitus ulcer, stage III      nitroGLYCERIN (NITROSTAT) 0.4 MG SL tablet Place 1 tablet (0.4 mg total) under the tongue every 5 (five) minutes as needed for Chest pain.  Qty: 25 tablet, Refills: 0      ondansetron (ZOFRAN-ODT) 8 MG TbDL Take 1 tablet (8 mg total) by mouth every 12 (twelve) hours as needed (nausea).  Qty: 20 tablet, Refills: 2    Associated Diagnoses: Nausea      polyethylene glycol (GLYCOLAX) 17 gram/dose powder Take 17 g by mouth once daily.  Qty: 510 g, Refills: 1      promethazine (PHENERGAN) 25 MG tablet Take 1 tablet (25 mg total) by mouth every 6 (six) hours as needed for Nausea.  Qty: 15 tablet, Refills: 0    Associated Diagnoses: Acute cystitis without hematuria; Non-intractable vomiting with nausea, unspecified vomiting type      simethicone (MYLICON) 80 MG chewable tablet Take 1 tablet (80 mg total) by mouth every 8 (eight) hours as needed for Flatulence.  Qty: 120 tablet, Refills: 5    Associated Diagnoses: S/P colostomy; Abdominal gas pain      traMADoL (ULTRAM) 50 mg tablet Take 1 tablet (50 mg total) by mouth every 6 (six) hours as needed for Pain.  Qty: 12 tablet, Refills: 0    Associated Diagnoses: Acute cystitis without hematuria; Non-intractable vomiting with nausea, unspecified vomiting type      furosemide (LASIX) 40 MG tablet Take 1 tablet (40 mg total) by mouth once daily. Prn wt gain or shortness of breath  Qty: 30 tablet, Refills: 11      mirtazapine (REMERON) 15 MG tablet Take 1 tablet (15 mg total) by mouth every evening.  Qty: 30  tablet, Refills: 11    Associated Diagnoses: Weight loss; FTT (failure to thrive) in adult; Depression, unspecified depression type           STOP taking these medications       sulfamethoxazole-trimethoprim 800-160mg (BACTRIM DS) 800-160 mg Tab Comments:   Reason for Stopping:         metoprolol succinate (TOPROL-XL) 25 MG 24 hr tablet Comments:   Reason for Stopping:         pantoprazole (PROTONIX) 40 MG tablet Comments:   Reason for Stopping:         prasugreL (EFFIENT) 10 mg Tab Comments:   Reason for Stopping:         spironolactone (ALDACTONE) 25 MG tablet Comments:   Reason for Stopping:                 I have seen and examined this patient within the last 30 days. My clinical findings that support the need for the home health skilled services and home bound status are the following:no   Weakness/numbness causing balance and gait disturbance due to Heart Failure making it taxing to leave home.     Diet:   cardiac diet      Activities:   activity as tolerated      When multiple disciplines ordered:    Start of Care occurs on Sunday - Wednesday schedule remaining discipline evaluations as ordered on separate consecutive days following the start of care.    Thursday SOC -schedule subsequent evaluations Friday and Monday the following week.     Friday - Saturday SOC - schedule subsequent discipline evaluations on consecutive days starting Monday of the following week.    For all post-discharge communication and subsequent orders please contact patient's primary care physician. If unable to reach primary care physician or do not receive response within 30 minutes, please contact PCP for clinical staff order clarification        Wound Care Orders  yes:  Pressure Ulcer(s) Stage II :     Apply Miconzazole:  2% cream                       Frequency:  Daily                             If incontinent of stool or urine, apply thin layer Barrier cream                   twice daily and PRN to wound         Pressure relief  measure:  for pressure redistribution    Colostomy :     Chelsea Thurston RN   Wound Ostomy  Wound Care  Progress Notes     Signed  Creation Time:  2022 12:54 PM     Expand All Collapse All                                                                                                                                              [facility] ([place])  Wound Care     Patient Name:  Odette Hart   MRN:  [id number]  Date: 2022  Diagnosis: Acute combined systolic and diastolic congestive heart failure     History:           Past Medical History:   Diagnosis Date    Acute coronary syndrome      Acute exacerbation of chronic obstructive pulmonary disease (COPD) 3/23/2022    Allergy      Arthritis      Cardiomyopathy      CHF (congestive heart failure)      Coronary artery disease      Coronary artery disease of native artery of native heart with stable angina pectoris 2021: [facility]: Cath: LAD: Proximal stent patent. LCX: Proximal 80%. LCX: Dominant. Moderate disease. LCX: HEVER 2.75 x 18 mm. Distal dissection. HEVER 2.75 x 22 mm.     Diverticulitis      Diverticulosis      Familial hypercholesterolemia 2022    Former smoker 2022    Heart attack      Heart disease      History of myocardial infarction 2022    Hyperlipidemia      Hypertension      Hypertension 2022    ICD (implantable cardioverter-defibrillator) in place      Non-ST elevation myocardial infarction (NSTEMI) 2019         Social History               Socioeconomic History    Marital status:    Tobacco Use    Smoking status: Former       Packs/day: 0.50       Types: Cigarettes       Start date: 1976       Quit date: 2012       Years since quittin.7    Smokeless tobacco: Never   Substance and Sexual Activity    Alcohol use: No    Drug use: No      Social Determinants of Health          Financial Resource Strain: Low Risk     Difficulty of Paying Living Expenses: Not hard at all    Food Insecurity: No Food Insecurity    Worried About Running Out of Food in the Last Year: Never true    Ran Out of Food in the Last Year: Never true   Transportation Needs: No Transportation Needs    Lack of Transportation (Medical): No    Lack of Transportation (Non-Medical): No   Physical Activity: Inactive    Days of Exercise per Week: 0 days    Minutes of Exercise per Session: 0 min   Stress: No Stress Concern Present    Feeling of Stress : Not at all   Social Connections: Socially Isolated    Frequency of Communication with Friends and Family: More than three times a week    Frequency of Social Gatherings with Friends and Family: Once a week    Attends Orthodox Services: Never    Active Member of Clubs or Organizations: No    Attends Club or Organization Meetings: Never    Marital Status:    Housing Stability: Low Risk     Unable to Pay for Housing in the Last Year: No    Number of Places Lived in the Last Year: 1    Unstable Housing in the Last Year: No            Precautions:            Allergies as of 08/26/2022 - Reviewed 08/26/2022   Allergen Reaction Noted    Augmentin [amoxicillin-pot clavulanate] Swelling 02/19/2018    Lisinopril Other (See Comments) 02/19/2018    Losartan Other (See Comments) 02/19/2018    Shellfish containing products Anaphylaxis 09/10/2018    Levaquin [levofloxacin] Other (See Comments) 09/18/2018    Clindamycin Palpitations 01/21/2019         [facility] Assessment Details/Treatment      Wound care follow up:      Met with patient this morning. Notified by nursing that ostomy pouch leaked into abdominal wound. Patient was cleaned by nursing staff. Irrigated wounds to abdomen and sacrum with Vashe. Packed wounds with Aquacel Ag hydrofiber and covered with Mepilex foam dressing. Applied new ostomy pouch to colostomy site.Patient tolerated dressing changes well. Notified nursing. Nursing to maintain pressure injury prevention interventions. Low air loss bed in use. Wound care to  continue to assist with pt prn.           09/09/22 1153        Altered Skin Integrity 05/09/22 1630 Sacral spine Full thickness tissue loss. Base is covered by slough and/or eschar in the wound bed   Date First Assessed/Time First Assessed: 05/09/22 1630   Altered Skin Integrity Present on Admission: yes  Location: Sacral spine  Description of Altered Skin Integrity: Full thickness tissue loss. Base is covered by slough and/or eschar in the wound bed   Wound Image    Description of Altered Skin Integrity Full thickness tissue loss with exposed bone, tendon, or muscle. Often includes undermining and tunneling. May extend into muscle and/or supporting structures.   Dressing Appearance Clean;Intact;Moist drainage   Drainage Amount Small   Drainage Characteristics/Odor Serous;Creamy;Tan;No odor   Appearance Red;Pink;Moist   Tissue loss description Full thickness   Periwound Area Intact;Scar tissue;Pink   Wound Edges Open   Wound Length (cm) 0.9 cm   Wound Width (cm) 0.9 cm   Wound Depth (cm) 0.8 cm   Wound Volume (cm^3) 0.648 cm^3   Wound Surface Area (cm^2) 0.81 cm^2   Care Cleansed with:;Antimicrobial agent   Dressing Applied;Silicone;Foam   Packing packed with;hydrofiber/alginate  (Aquacel Ag)        Incision/Site 05/09/22 1135 Abdomen   Date First Assessed/Time First Assessed: 05/09/22 1135   Present Prior to Hospital Arrival?: Yes  Location: Abdomen   Wound Image    Incision WDL ex   Dressing Appearance Intact;Moist drainage   Drainage Amount Moderate   Drainage Characteristics/Odor Creamy;Serous;No odor   Appearance Pink;Red;Moist   Wound Edges Open   Wound Depth (cm) 1.5 cm   Undermining (depth (cm)/location) 1.5 cm @ 3 oclock and 2 cm @ 6 oclock   Care Cleansed with:;Antimicrobial agent   Dressing Applied;Silicone;Foam   Packing packed with;hydrofiber/alginate  (aquacel ag)   Periwound Care Dry periwound area maintained;Skin barrier film applied        Colostomy 05/09/22 1100 Descending/sigmoid LLQ   Placement  Date/Time: 05/09/22 1100   Inserted by: MD  Colostomy Type: Descending/sigmoid  Location: LLQ   Stomal Appliance 1 piece   Stoma Appearance round;rosebud appearance;pink;protruding above skin level   Stoma Size (in) 30   Site Assessment Clean;Intact   Peristomal Assessment Clean;Intact   Accessories/Skin Care cleansed w/ water;barrier substance over peristomal skin;skin barrier ring   Stoma Function flatus;stool   Treatment Bag change   Tolerance no signs/symptoms of discomfort         09/09/2022                  I certify that this patient is confined to her home and needs

## 2022-09-12 NOTE — PT/OT/SLP PROGRESS
Physical Therapy Treatment    Patient Name:  Odette Hart   MRN:  [id number]    Recommendations:     Discharge Recommendations:  home health OT, home health PT   Discharge Equipment Recommendations: other (see comments) (TBD pending patient progress)   Barriers to discharge: Decreased caregiver support    Assessment:     Odette Hart is a 66 y.o. female admitted with a medical diagnosis of Acute combined systolic and diastolic congestive heart failure.  She presents with the following impairments/functional limitations:  weakness, edema, impaired balance, impaired cardiopulmonary response to activity, impaired endurance, impaired functional mobility, impaired self care skills, impaired skin, pain, decreased safety awareness.    Sit>stand with RW and SBA  Amb 250' with RW and SPV + 250' with HHA, 3 standing rest breaks taken during bouts, decreased stability but no LoB  SpO2 Pre 99%, HR 72  SpO2 Post 89%, HR 78, 1 min recovery to 98%  Pt with good mobility, improved endurance, pt preparing for d/c to home  Rec HHPT    Rehab Prognosis: Good; patient would benefit from acute skilled PT services to address these deficits and reach maximum level of function.    Recent Surgery: * No surgery found *      Plan:     During this hospitalization, patient to be seen 3 x/week to address the identified rehab impairments via gait training, therapeutic activities, therapeutic exercises, neuromuscular re-education and progress toward the following goals:    Plan of Care Expires:  09/28/22    Subjective     Chief Complaint: muscle soreness  Patient/Family Comments/goals: pt agreeable to therapy  Pain/Comfort:  Pain Rating 1: 0/10  Pain Rating Post-Intervention 1: 0/10      Objective:     Communicated with nurse Mullins prior to session.  Patient found up in chair with telemetry, colostomy, peripheral IV upon PT entry to room.     General Precautions: Standard, fall   Orthopedic Precautions:N/A   Braces:    Respiratory  Status: Room air     Functional Mobility:  Transfers:     Sit to Stand:  stand by assistance with rolling walker  Gait: 250' with RW and SPV + 250' with HHA, 3 standing rest breaks taken during bouts, decreased stability but no LoB      AM-PAC 6 CLICK MOBILITY  Turning over in bed (including adjusting bedclothes, sheets and blankets)?: 4  Sitting down on and standing up from a chair with arms (e.g., wheelchair, bedside commode, etc.): 4  Moving from lying on back to sitting on the side of the bed?: 4  Moving to and from a bed to a chair (including a wheelchair)?: 3  Need to walk in hospital room?: 3  Climbing 3-5 steps with a railing?: 3  Basic Mobility Total Score: 21       Therapeutic Activities and Exercises:   Gait training as noted  SpO2 Pre 99%, HR 72  SpO2 Post 89%, HR 78, 1 min recovery to 98%    Patient left up in chair with all lines intact, call button in reach, and nurse Susanna notified..    GOALS:   Multidisciplinary Problems       Physical Therapy Goals          Problem: Physical Therapy    Goal Priority Disciplines Outcome Goal Variances Interventions   Physical Therapy Goal     PT, PT/OT      Description: Goals to be met by: Discharge     Patient will increase functional independence with mobility by performin. Gait  x 300 feet with Modified [place] using LRAD, w/o LOB.                    Multidisciplinary Problems (Resolved)          Problem: Physical Therapy    Goal Priority Disciplines Outcome Goal Variances Interventions   Physical Therapy Goal   (Resolved)     PT, PT/OT Met     Description: Goals to be met by: 2022    Patient will increase functional independence with mobility by performin. Sit<>stand with supervision with least restrictive AD. - MET 22  2. Gait x 200 feet with SBA with least restrictive AD. - MET 22  3. Ascend/descend 4 step(s) with least restrictive assistive device and CGA. - MET 22                          Time Tracking:     PT Received  On: 09/12/22  PT Start Time: 1357     PT Stop Time: 1425  PT Total Time (min): 28 min     Billable Minutes: Gait Training 28    Treatment Type: Treatment  PT/PTA: PTA     PTA Visit Number: 2     09/12/2022

## 2022-09-12 NOTE — PLAN OF CARE
POC reviewed. No significant events this shift. Cardiac monitor maintained. Pt stable on RA. Complaints of pain treated with PRN medication per MAR. Pt voids and shifts in bed with assist. Wound care performed by nurse. Bed low and locked, side rails up x3, call light within reach.    Problem: Adult Inpatient Plan of Care  Goal: Plan of Care Review  Outcome: Ongoing, Progressing  Goal: Patient-Specific Goal (Individualized)  Outcome: Ongoing, Progressing  Goal: Absence of Hospital-Acquired Illness or Injury  Outcome: Ongoing, Progressing  Goal: Optimal Comfort and Wellbeing  Outcome: Ongoing, Progressing  Goal: Readiness for Transition of Care  Outcome: Ongoing, Progressing     Problem: Impaired Wound Healing  Goal: Optimal Wound Healing  Outcome: Ongoing, Progressing     Problem: Skin Injury Risk Increased  Goal: Skin Health and Integrity  Outcome: Ongoing, Progressing     Problem: Fall Injury Risk  Goal: Absence of Fall and Fall-Related Injury  Outcome: Ongoing, Progressing     Problem: Coping Ineffective  Goal: Effective Coping  Outcome: Ongoing, Progressing
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
## 2022-09-12 NOTE — PLAN OF CARE
Pt with two accepting facilities, however, [facility] does not have rails on the beds, pt states she needs rails to get up. [facility] does not currently have wd care.     Additional referrals sent to the [place] [facility] and other SNF facilities.    Pt is in agreement to go to SNF if the beds have rails and wd care is provided.    CM to continue to follow for accepting facility that meets above requirements.   09/12/22 0919   Discharge Reassessment   Assessment Type Discharge Planning Reassessment   Did the patient's condition or plan change since previous assessment? Yes   Discharge Plan discussed with: Patient   Communicated SIVAN with patient/caregiver Date not available/Unable to determine   Discharge Plan A Skilled Nursing Facility   Discharge Plan B Home   DME Needed Upon Discharge  none   Discharge Barriers Identified   (No accepting facility with rails on beds and wd care at this time)   Why the patient remains in the hospital Placement issues   Post-Acute Status   Post-Acute Authorization Placement   Post-Acute Placement Status Referrals Sent   Patient choice form signed by patient/caregiver List with quality metrics by geographic area provided;List from CMS Compare;List from System Post-Acute Care   Discharge Delays (!) Other  (no accepting facility at this time)

## 2022-09-12 NOTE — PROGRESS NOTES
"[facility] Medicine  Progress Note    Patient Name: Odette Hatr  MRN: [id number]  Patient Class: IP- Inpatient   Admission Date: 8/26/2022  Length of Stay: 17 days  Attending Physician: Reddy Pham MD  Primary Care Provider: Braxton Barragan MD        Subjective:     Principal Problem:Acute combined systolic and diastolic congestive heart failure        HPI:  Per Michael Long, NP:  "The patient is a 66 y.o. female with a past medical history of MI, HLD, CAD, cardiomyopathy, HTN, and CHF who presents for evaluation of shortness of breath onset earlier today. She reports "I've been feeling bad," endorsing associated symptoms of nausea and weakness. She states she has been near vomiting but denies any vomiting episodes. She reports elevated temperature at 99 F last night. She states her current presentations feel similar to her previous hospitalizations for dyspnea. Pt is regularly followed by cardiology. She denies pain elsewhere and other somatic complaints.  BNP elevated to greater than 3000 with pulmonary edema noted on CXR.  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    Started on Lasix for diuresis, but course complicated with hypotension and hyponatremia.  Carvedilol discontinued on 9/3/2022 by Cardiology service and midodrine added back on 9/4/2022.  SBP a little improved today.  Urine output recorded only as 450ml overnight.  Patient with " chronic hyponatremia with recent baseline of 129 and likely Cardiorenal in origin.  Received Tolvaptan x 2 and now increased to TIW.  Followed by Nephrology.  Na stable. Patient open to skilled nursing placement.  Working on placement.      Interval History: No acute events. Pending placement     Review of Systems   Constitutional:  Positive for fatigue. Negative for chills and fever.   Cardiovascular:  Negative for chest pain.   Gastrointestinal:  Negative for abdominal pain, nausea and vomiting.   Genitourinary:  Negative for dysuria and frequency.     Objective:     Vital Signs (Most Recent):  Temp: 97.7 °F (36.5 °C) (09/12/22 0753)  Pulse: 74 (09/12/22 1000)  Resp: 16 (09/12/22 1029)  BP: (!) 93/50 (09/12/22 1029)  SpO2: 98 % (09/12/22 0753)   Vital Signs (24h Range):  Temp:  [97.6 °F (36.4 °C)-98.2 °F (36.8 °C)] 97.7 °F (36.5 °C)  Pulse:  [] 74  Resp:  [16] 16  SpO2:  [95 %-99 %] 98 %  BP: (88-97)/(49-55) 93/50     Weight: 49.6 kg (109 lb 5.6 oz)  Body mass index is 19.37 kg/m².    Intake/Output Summary (Last 24 hours) at 9/12/2022 1152  Last data filed at 9/12/2022 0521  Gross per 24 hour   Intake 400 ml   Output 1800 ml   Net -1400 ml        Physical Exam  Constitutional:       General: She is not in acute distress.  HENT:      Head: Atraumatic.   Eyes:      Conjunctiva/sclera: Conjunctivae normal.   Cardiovascular:      Rate and Rhythm: Normal rate and regular rhythm.      Heart sounds: Murmur heard.   Pulmonary:      Effort: Pulmonary effort is normal.      Breath sounds: Normal breath sounds. No wheezing.   Abdominal:      General: Bowel sounds are normal. There is no distension.      Palpations: Abdomen is soft.      Tenderness: There is no abdominal tenderness.      Comments: Colostomy with stool in bag.   Musculoskeletal:         General: No deformity. Normal range of motion.      Cervical back: Neck supple.   Neurological:      Mental Status: She is alert and oriented to person, place, and time.        Significant Labs: All pertinent labs within the past 24 hours have been reviewed.    Significant Imaging: I have reviewed all pertinent imaging results/findings within the past 24 hours.        Assessment/Plan:      * Acute combined systolic and diastolic congestive heart failure  Dr. Yohannes Cordova (Cardiology) on board.  Patient has difficulty tolerating goal-directed therapy for heart failure due to hypertension and also reports side effects to numerous medications in the past.  Patient counseled importance of adhering to medical therapy as much as possible to improve her chances of improving her cardiac function and decreasing her mortality.  Discontinued midodrine 9/8.  Blood pressure okay.  Currently on losartan, metoprolol and lasix     Encounter for palliative care  Followed by Palliative Care  Patient is Full Code      Debility  Continue with therapy.    Hyponatremia  Chronic with recent baseline of 129 and likely from Cardiorenal in origin.  Received Tolvaptan x 2 and now starting biw.  Followed by Nephrology.  Na stable at 132    Plan:  Follow up with Nephrology recommendations - Tolvaptan increased to 30mg three times weekly  Follow BMP           Primary hypertension  Metoprolol resumed , losartan added by cardio     Familial hypercholesterolemia  Has previous Statin intolerance.  -Continue Zetia      Coronary artery disease of native artery of native heart with stable angina pectoris  Continue medical therapy as tolerated.  Patient adamant she cannot tolerate any statin therapy at any dose and declining a trial of statin therapy.      VTE Risk Mitigation (From admission, onward)         Ordered     Place JESS hose  Until discontinued         09/02/22 0910     enoxaparin injection 40 mg  Daily         08/26/22 0325     IP VTE HIGH RISK PATIENT  Once         08/26/22 0325     Place sequential compression device  Until discontinued         08/26/22 0325                Discharge Planning   SIVAN:       Code Status: Full Code   Is the patient medically ready for discharge?:     Reason for patient still in hospital (select all that apply): Pending disposition  Discharge Plan A: Skilled Nursing Facility   Discharge Delays: (!) Other (no accepting facility at this time)              Reddy Pham MD  Department of Hospital Medicine   [facility] ([place])

## 2022-09-12 NOTE — PLAN OF CARE
Pt has an accepting facility, [facility], beds have rails at the head and wound care. As pt was speaking to facility rep, she changed her mind and now states she refuses SNF.    Pt will go home with HH vs outpt wound care.    CM messaged Dr Pham to put in HH orders, CM will send to N to see if wound care is available in pt's area. If HH not available, pt states she will go to outpt wd care at [facility] as she did in the past.    CM to follow for plans and arrangements.

## 2022-09-12 NOTE — ASSESSMENT & PLAN NOTE
Chronic with recent baseline of 129 and likely from Cardiorenal in origin.  Received Tolvaptan x 2 and now starting biw.  Followed by Nephrology.  Na stable at 132    Plan:  Follow up with Nephrology recommendations - Tolvaptan increased to 30mg three times weekly  Follow BMP

## 2022-09-12 NOTE — PT/OT/SLP PROGRESS
Occupational Therapy   Treatment and Discharge    Name: Odette Hart  MRN: [id number]  Admitting Diagnosis:  Acute combined systolic and diastolic congestive heart failure       Recommendations:     Discharge Recommendations: home health PT, home health OT  Discharge Equipment Recommendations:  none  Barriers to discharge:  None    Assessment:     Odette Hart is a 66 y.o. female with a medical diagnosis of Acute combined systolic and diastolic congestive heart failure.        Plan:     Pt CLEARED OT. No further acute, skilled OT needs at this time. Pt safe to continue OOB ADLs mod I with RW. OT signing off. Please reconsult if needs arise.     Subjective     Pain/Comfort:  Pain Rating 1: 0/10    Objective:     Communicated with: RN prior to session.  Patient found up in chair with telemetry, colostomy upon OT entry to room.    General Precautions: Standard, anti-coagulation medicine, fall   Orthopedic Precautions:N/A   Braces: N/A  Respiratory Status: Room air     Occupational Performance:     Bed Mobility:    Sit to supine: Independent      Functional Mobility/Transfers:  Sit <> stand: mod I with RW  Pt reports performing toilet transfers at MOD I level   Functional Mobility: Pt mod I with RW for functional ambulation in hallway and around room to sink for grooming     Activities of Daily Living:  Grooming: independent in standing for oral care and hair management       [facility] 6 Click ADL: 24    Treatment & Education:  OT role, plan of care, progression of goals, importance of continued OOB activity, ADL/functional mobility/transfer retraining, fall prevention, safety precautions    Patient left HOB elevated with all lines intact, call button in reach, and RN notified    GOALS:   Multidisciplinary Problems       Occupational Therapy Goals       Not on file              Multidisciplinary Problems (Resolved)          Problem: Occupational Therapy    Goal Priority Disciplines Outcome Interventions    Occupational Therapy Goal   (Resolved)     OT, PT/OT Met    Description: Goals to be met by: 9/3/22    Patient will increase functional independence with ADLs by performing:    MET GOALS:  LB dressing at SBA. MET 9/12/22  BSC/toilet transfer at mod I. MET 9/12.22  UBD at Independent level. MET 9/12/22  Grooming in standing at Independent level. MET 9/12/22   BSC/toilet transfers at SBA. MET 8/31/2022   ToIleting at SBA.  MET 8/31/2022   UB dressing at SBA.  MET 8/31/2022   Grooming sitting EOB at SBA.  MET 8/31/2022 in standing  Toileting at Independent level. MET 9/8/22                       Time Tracking:     OT Date of Treatment: 09/12/22  OT Start Time: 0934  OT Stop Time: 1001  OT Total Time (min): 27 min    Billable Minutes:Self Care/Home Management 27 minutes     OT/FROILAN: OT          9/12/2022

## 2022-09-13 NOTE — PLAN OF CARE
CM met with pt several times today. Ms Hart refuses SNF, states she will go home with outpt wound care.    [facility] not able to arrange HH with wd care in [facility] at this time, Home health agencies do not have all necessary staff for that area.    CM arranged outpt wound care at [facility], apt scheduled and entered into AVS.    Pt to discharge home today, transport arranged thru [employer] for  WC van, pt confirms she has a key and that she is able to get into her home.    Pt would like to follow-up with Dr Cordova, CM called to arrange and am informed that office will call pt to schedule.    Pt ready for discharge from CM perspective.    [facility] ([facility])  Discharge Final Note    Primary Care Provider: Braxton Barragan MD    Expected Discharge Date: 9/13/2022    Final Discharge Note (most recent)       Final Note - 09/13/22 1457          Final Note    Assessment Type Final Discharge Note (P)      Anticipated Discharge Disposition Home or Self Care (P)    out pt wound care arranged    What phone number can be called within the next 1-3 days to see how you are doing after discharge? [phone] (P)      Hospital Resources/Appts/Education Provided Provided patient/caregiver with written discharge plan information;Appointments scheduled by Navigator/Coordinator;Appointments scheduled and added to AVS;Provided education on problems/symptoms using teachback (P)         Post-Acute Status    Post-Acute Authorization Other (P)    out pt wound care arranged at [facility]    Discharge Delays None known at this time (P)                      Important Message from Medicare  Important Message from Medicare regarding Discharge Appeal Rights: Other (comments), Explained to patient/caregiver (Verbal consent)     Date IMM was signed: 09/08/22  Time IMM was signed: 1133    Contact Info       Braxton Barragan MD   Specialty: Internal Medicine, Pediatrics   Relationship: PCP - General    [address]  [place] [address]   Phone: [phone]       Next Steps: Schedule an appointment as soon as possible for a visit in 1 week(s)    Freddy Arenas MD   Specialty: Cardiology    [address]  [address]  [place] [address]   Phone: [phone]       Next Steps: Schedule an appointment as soon as possible for a visit in 1 week(s)    [facility]    [address] [place] [address]   Phone: [phone]       Next Steps: Go on 9/16/2022    Instructions: For wound care: appointment is for Friday Sept 16, 2022, for 1:30 PM

## 2022-09-13 NOTE — PT/OT/SLP PROGRESS
Physical Therapy Treatment    Patient Name:  Odette Hatr   MRN:  [id number]    Recommendations:     Discharge Recommendations:  home health OT, home health PT   Discharge Equipment Recommendations: other (see comments) (TBD pending patient progress)   Barriers to discharge: Decreased caregiver support    Assessment:     Odette Hart is a 66 y.o. female admitted with a medical diagnosis of Acute combined systolic and diastolic congestive heart failure.  She presents with the following impairments/functional limitations:  weakness, edema, pain, impaired balance, impaired endurance, impaired functional mobility, impaired cardiopulmonary response to activity, impaired self care skills, impaired skin, decreased safety awareness.    Supine>sit with Danii at trunk  Sit>stand with RW and SBA  Amb 500' with RW and SPV, 3 standing rest breaks taken, pt with decreased gait speed, jan and B step length  Pre:  SpO2 99%, HR 81 (standing)  Post: SpO2 81% and , 2 min recovery to 97%, HR 75  Pt demos good mobility and safety with RW  Rec HHPT    Rehab Prognosis: Good; patient would benefit from acute skilled PT services to address these deficits and reach maximum level of function.    Recent Surgery: * No surgery found *      Plan:     During this hospitalization, patient to be seen 3 x/week to address the identified rehab impairments via gait training, therapeutic activities, therapeutic exercises, neuromuscular re-education and progress toward the following goals:    Plan of Care Expires:  09/28/22    Subjective     Chief Complaint: pain in shoulders  Patient/Family Comments/goals: hope to go home today  Pain/Comfort:  Pain Rating 1: other (see comments) (pain reported, no rated)  Location - Side 1: Bilateral  Location - Orientation 1: generalized  Location 1: shoulder (tootie anterior L shoulder, site of pacemaker)  Pain Addressed 1: Reposition, Distraction, Cessation of Activity, Nurse notified  Pain Rating  Post-Intervention 1: other (see comments) (no change)      Objective:     Communicated with nurse Mullins prior to session.  Patient found HOB elevated with telemetry, colostomy, peripheral IV upon PT entry to room.     General Precautions: Standard, fall   Orthopedic Precautions:N/A   Braces:    Respiratory Status: Nasal cannula, flow 1 L/min     Functional Mobility:  Bed Mobility:     Supine to Sit: minimum assistance  Transfers:     Sit to Stand:  stand by assistance with rolling walker  Gait: 500' with RW and SPV, 3 standing rest breaks taken, pt with decreased gait speed, jan and B step length      AM-PAC 6 CLICK MOBILITY  Turning over in bed (including adjusting bedclothes, sheets and blankets)?: 4  Sitting down on and standing up from a chair with arms (e.g., wheelchair, bedside commode, etc.): 4  Moving from lying on back to sitting on the side of the bed?: 4  Moving to and from a bed to a chair (including a wheelchair)?: 3  Need to walk in hospital room?: 3  Climbing 3-5 steps with a railing?: 3  Basic Mobility Total Score: 21       Therapeutic Activities and Exercises:   Gait training as noted    Patient left up in chair with all lines intact, call button in reach, and nurse Mullins notified..    GOALS:   Multidisciplinary Problems       Physical Therapy Goals          Problem: Physical Therapy    Goal Priority Disciplines Outcome Goal Variances Interventions   Physical Therapy Goal     PT, PT/OT      Description: Goals to be met by: Discharge     Patient will increase functional independence with mobility by performin. Gait  x 300 feet with Modified [place] using LRAD, w/o LOB.                    Multidisciplinary Problems (Resolved)          Problem: Physical Therapy    Goal Priority Disciplines Outcome Goal Variances Interventions   Physical Therapy Goal   (Resolved)     PT, PT/OT Met     Description: Goals to be met by: 2022    Patient will increase functional independence with  mobility by performin. Sit<>stand with supervision with least restrictive AD. - MET 22  2. Gait x 200 feet with SBA with least restrictive AD. - MET 22  3. Ascend/descend 4 step(s) with least restrictive assistive device and CGA. - MET 22                          Time Tracking:     PT Received On: 22  PT Start Time: 841     PT Stop Time: 909  PT Total Time (min): 28 min     Billable Minutes: Gait Training 28    Treatment Type: Treatment  PT/PTA: PTA     PTA Visit Number: 3     2022

## 2022-09-13 NOTE — DISCHARGE SUMMARY
"[facility], [place], operated by [facility]  Discharge Summary      Patient Name: Odette Hart  MRN: [id number]  Patient Class: IP- Inpatient  Admission Date: 8/26/2022  Hospital Length of Stay: 18 days  Discharge Date and Time:  09/13/2022 1:41 PM  Attending Physician: Marco Antonio Alvarado MD   Discharging Provider: Marco Antonio Alvarado MD  Primary Care Provider: Braxton Barragan MD      HPI:   Per Michael Long NP:  "The patient is a 66 y.o. female with a past medical history of MI, HLD, CAD, cardiomyopathy, HTN, and CHF who presents for evaluation of shortness of breath onset earlier today. She reports "I've been feeling bad," endorsing associated symptoms of nausea and weakness. She states she has been near vomiting but denies any vomiting episodes. She reports elevated temperature at 99 F last night. She states her current presentations feel similar to her previous hospitalizations for dyspnea. Pt is regularly followed by cardiology. She denies pain elsewhere and other somatic complaints.  BNP elevated to greater than 3000 with pulmonary edema noted on CXR.  She will be admitted for further management of her acute on chronic systolic congestive heart failure."      Hospital Course:   Patient with CAD s/p MI with known CM with Chronic systolic and diastolic CHF who presented with CHF.  Followed by Freddy Arenas MD at [facility].  BNP was 3698 and CXR with pulmonary edema on admission.  Repeat TTE on 6/4/2022 with EF down to 10% and Grade II DD with low normal RVSF.  Started on Lasix for diuresis, but course complicated with hypotension and hyponatremia.  Patient with chronic hyponatremia with recent baseline of 129 and likely Cardiorenal in origin.  Followed by Cardiology and Nephrology.  For her Hyponatremia, she received Tolvaptan and now on twice a day and Na stable at 132.  Her blood pressure slowly improved (required intermittent Midodrine) and Metoprolol XL added, as well as Losartan with SBP in 90s and " stable.  She as adequately diuresed and off Oxygen.  She has worked with PT/OT and recommended home PT/OT - patient is declining this.  She has 24 hour family support.  She is a high risk for falling and this was discussed in detail with patient - she continues to decline SNF and limited options for HH where she lives.  She will continue/resume previous wound care as outpatient.         Goals of Care Treatment Preferences:  Code Status: Full Code      Consults:   Consults (From admission, onward)        Status Ordering Provider     Inpatient consult to Palliative Care  Once        Provider:  Layla Taylor DNP    Completed MATT CANADA     Inpatient consult to Nephrology-[facility]  Once        Provider:  Chung Bojorquez MD    Completed JOHNATHAN CAMPBELL     Inpatient consult to Cardiology  Once        Provider:  Yohannes Cordova MD    Completed GAVI ROMANO     Inpatient consult to Social Work/Case Management  Once        Provider:  (Not yet assigned)    Completed GAVI ROMANO     Inpatient consult to Registered Dietitian/Nutritionist  Once        Provider:  (Not yet assigned)    Completed GAVI ROMANO          No new Assessment & Plan notes have been filed under this hospital service since the last note was generated.  Service: Hospital Medicine    Final Active Diagnoses:    Diagnosis Date Noted POA    PRINCIPAL PROBLEM:  Acute combined systolic and diastolic congestive heart failure [I50.41] 08/30/2022 Yes    Hyponatremia [E87.1] 05/15/2022 Yes    Encounter for palliative care [Z51.5] 08/30/2022 Not Applicable    Debility [R53.81] 05/16/2022 Yes    Primary hypertension [I10] 01/24/2022 Yes    Familial hypercholesterolemia [E78.01] 01/22/2022 Yes    Coronary artery disease of native artery of native heart with stable angina pectoris [I25.118] 04/19/2021 Yes      Problems Resolved During this Admission:    Diagnosis Date Noted Date Resolved POA    Acute on chronic systolic  heart failure [I50.23] 05/10/2022 09/01/2022 Yes       Discharged Condition: stable    Disposition: Home-Health Care [facility]    Follow Up:   Follow-up Information     Braxton Barragan MD. Schedule an appointment as soon as possible for a visit in 1 week(s).    Specialties: Internal Medicine, Pediatrics  Contact information:  [address] [place] [address]  [phone]             Freddy Arenas MD. Schedule an appointment as soon as possible for a visit in 1 week(s).    Specialty: Cardiology  Contact information:  [address]  [address]  [place] [address]  [phone]                       Patient Instructions:      Ambulatory referral/consult to [facility]   Standing Status: Future   Referral Priority: Routine Referral Type: Consultation   Referral Reason: Specialty Services Required   Number of Visits Requested: 1     Diet Cardiac     Notify your health care provider if you experience any of the following:  temperature >100.4     Notify your health care provider if you experience any of the following:  difficulty breathing or increased cough     Notify your health care provider if you experience any of the following:  increased confusion or weakness     Activity as tolerated       Significant Diagnostic Studies: Labs:   BMP:   Recent Labs   Lab 09/12/22  0504   GLU 97   *   K 3.8   CL 93*   CO2 30*   BUN 11   CREATININE 0.6   CALCIUM 8.0*    and CBC No results for input(s): WBC, HGB, HCT, PLT in the last 48 hours.    Pending Diagnostic Studies:     None         Medications:  Reconciled Home Medications:      Medication List      START taking these medications    famotidine 20 MG tablet  Commonly known as: PEPCID  Take 1 tablet (20 mg total) by mouth once daily.  Start taking on: September 14, 2022     losartan 25 MG tablet  Commonly known as: COZAAR  Take 1 tablet (25 mg total) by mouth once daily.  Start taking on: September 14, 2022     magnesium  oxide 400 mg (241.3 mg magnesium) tablet  Commonly known as: MAG-OX  Take 1 tablet (400 mg total) by mouth once daily.  Start taking on: September 14, 2022     tolvaptan 30 mg Tab  Commonly known as: SAMSCA  Take 1 tablet (30 mg total) by mouth every Tues, Thurs.  Start taking on: September 15, 2022        CHANGE how you take these medications    furosemide 40 MG tablet  Commonly known as: LASIX  Take 1 tablet (40 mg total) by mouth 2 (two) times daily.  What changed:   · when to take this  · additional instructions        CONTINUE taking these medications    acetaminophen 500 MG tablet  Commonly known as: TYLENOL  Take 2 tablets (1,000 mg total) by mouth every 8 (eight) hours as needed for Pain.     albuterol 90 mcg/actuation inhaler  Commonly known as: PROVENTIL/VENTOLIN HFA  Inhale 2 puffs into the lungs every 6 (six) hours as needed for Wheezing. Rescue     ALPRAZolam 0.5 MG tablet  Commonly known as: XANAX  Take 1 tablet (0.5 mg total) by mouth nightly as needed for Anxiety.     aspirin 81 MG EC tablet  Commonly known as: ECOTRIN  Take 1 tablet (81 mg total) by mouth once daily.     clopidogreL 75 mg tablet  Commonly known as: PLAVIX  Take 1 tablet (75 mg total) by mouth once daily.     collagenase ointment  Commonly known as: SANTYL  Apply topically once daily.     docusate sodium 100 MG capsule  Commonly known as: COLACE  Take 1 capsule (100 mg total) by mouth 2 (two) times daily.     ENSURE HIGH PROTEIN Liqd  Generic drug: food supplemt, lactose-reduced  Take 240 mLs by mouth 2 (two) times a day.     ezetimibe 10 mg tablet  Commonly known as: ZETIA  Take 1 tablet (10 mg total) by mouth every evening.     fluticasone propionate 50 mcg/actuation nasal spray  Commonly known as: FLONASE  1 spray (50 mcg total) by Each Nostril route once daily.     HYDROcodone-acetaminophen 5-325 mg per tablet  Commonly known as: NORCO  Take 1 tablet by mouth every 12 (twelve) hours as needed for Pain.     metoprolol succinate 25  MG 24 hr tablet  Commonly known as: TOPROL-XL  Take 1 tablet (25 mg total) by mouth once daily.  Start taking on: September 14, 2022     mirtazapine 15 MG tablet  Commonly known as: REMERON  Take 1 tablet (15 mg total) by mouth every evening.     nitroGLYCERIN 0.4 MG SL tablet  Commonly known as: NITROSTAT  Place 1 tablet (0.4 mg total) under the tongue every 5 (five) minutes as needed for Chest pain.     ondansetron 8 MG Tbdl  Commonly known as: ZOFRAN-ODT  Take 1 tablet (8 mg total) by mouth every 12 (twelve) hours as needed (nausea).     polyethylene glycol 17 gram/dose powder  Commonly known as: GLYCOLAX  Take 17 g by mouth once daily.     promethazine 25 MG tablet  Commonly known as: PHENERGAN  Take 1 tablet (25 mg total) by mouth every 6 (six) hours as needed for Nausea.     simethicone 80 MG chewable tablet  Commonly known as: MYLICON  Take 1 tablet (80 mg total) by mouth every 8 (eight) hours as needed for Flatulence.     traMADoL 50 mg tablet  Commonly known as: ULTRAM  Take 1 tablet (50 mg total) by mouth every 6 (six) hours as needed for Pain.        STOP taking these medications    carvediloL 3.125 MG tablet  Commonly known as: COREG     esomeprazole 40 MG capsule  Commonly known as: NEXIUM     pantoprazole 40 MG tablet  Commonly known as: PROTONIX     prasugreL 10 mg Tab  Commonly known as: EFFIENT     spironolactone 25 MG tablet  Commonly known as: ALDACTONE     sulfamethoxazole-trimethoprim 800-160mg 800-160 mg Tab  Commonly known as: BACTRIM DS            Indwelling Lines/Drains at time of discharge:   Lines/Drains/Airways     Drain  Duration                Colostomy 05/09/22 1100 Descending/sigmoid  days                Time spent on the discharge of patient: 35 minutes         Marco Antonio Alvarado MD  Department of Hospital Medicine  [facility]
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
## 2022-09-13 NOTE — PLAN OF CARE
Pt remained free of falls and injuries. AAOx4. Pt anxious throughout shift, PRN Xanax given. Pt calm and cooperative this AM. Purposeful hourly rounding performed. Pt swallows meds whole. IV flushed and saline locked. Managed c/o generalized hip  and shoulder pain with PRN tramadol. Patient ambulates with standby assist using walker. Frequent weight shift encouraged. VSS on room air. Pt resting in bed, NADN, denies needs at this time. Bed low and locked, bed alarm on, call light in reach. Side rails up x2. Report given to JODIE Mullins.

## 2022-09-13 NOTE — PLAN OF CARE
PHN working to arrange HH with wound care for this pt.    CM to follow for plans and arrangement.s

## 2022-09-13 NOTE — DISCHARGE INSTRUCTIONS
For your heart disease (congestive heart failure, coronary artery disease), please continue the following medications:  Losartan 25mg once a day  Furosemide 40mg twice a day  Metoprolol XL 25mg once a day  Aspirin 81mg once a day  Clopidogrel 75mg once a day  Magnesium Oxide 400mg once a day  Ezetimibe 10mg once a day  Please continue to limit fluid intake to 1 liter per day - no plain water or tea.  Please follow up with your Cardiologist in 1 week    For your Low Sodium, continue with Tolvatan 30mg every Tuesday and Thursday.    Please resume your wound care outpatient as previous.  Follow up with your Primary Care Provider in 1 week.

## 2022-09-14 NOTE — PROGRESS NOTES
IP Liaison - Final Visit Note    Patient: Odette Hart  MRN:  [id number]  Date of Service:  9/14/2022  Completed by:  RADHA Madrigal    Reason for Visit   Patient presents with    IP Liaison Chart Review           Patient Summary     Discharge Date: 9/13/2022  Discharge telephone number/address: [phone]/[address] [place] [address]  Follow up provider: Braxton Barragan MD  Follow up appointments: 9/20/2022, 2:15pm  Home Health agency & telephone number: n/a  DME ordered &  name: n/a  Assigned OPCM RN/SW: n/a  Report sent to follow up team (PCP/OPCM) via in basket message: n/a  Community Resources arranged including agency name & contact info: No support & services have been documented.        RADHA Madrigal

## 2022-09-14 NOTE — TELEPHONE ENCOUNTER
----- Message from Wisam Hackett sent at 9/14/2022 10:11 AM CDT -----  Regarding: CAll BAck  Name of Who is Calling:JERE GARCIAS [[id number]]              What is the request in detail: Patient requesting a call back about medication tolvaptan (SAMSCA) 30 mg Tab states her insurance doesn't cover the medication and requesting for something else. Please assist               Can the clinic reply by [facility]: No              [facility] Back [facility][phone]

## 2022-09-15 NOTE — TELEPHONE ENCOUNTER
I spoke with  the patient and let her know that he did not prescribe that medication and he can discuss medication changes at her upcoming appointment. Patient has no ride to the appointment, so I edited it so that we can provide free transportation.

## 2022-09-15 NOTE — TELEPHONE ENCOUNTER
Medication was prescribed by Dr Marco Antonio Geller please ask pt contact him for change in medications

## 2022-09-15 NOTE — TELEPHONE ENCOUNTER
----- Message from Leta Vyas sent at 9/14/2022  6:01 PM CDT -----  Type:  Patient Returning Call    Who Called: [employer]  Who Left Message for Patient:pt  Does the patient know what this is regarding?  Med  TOLVAPTAN INS WILL  NOT COVER THIS MED  Would the patient rather a call back or a response via [facility]? call  Best Call Back Number: [phone]

## 2022-09-20 NOTE — PROGRESS NOTES
Verified pt ID using name and . NDKA. Administered B12 2,000mcg in Left Dorsalgluteal per physician order using aseptic technique. Aspirated and no blood return noted. Pt tolerated well with no adverse reactions noted.

## 2022-09-20 NOTE — TELEPHONE ENCOUNTER
Called pt to try and schedule and appt.  Left message. Will send a portal message and respond to pt in that manner.

## 2022-09-20 NOTE — PROGRESS NOTES
Subjective:       Patient ID: Odette Hart is a 66 y.o. female.    Chief Complaint: Hospital Follow Up    HPI  Pt with h/o ischemic cardiomyopathy EF 10% HTN CHF h/o AICD  anxiety depression chronic pain she is here today for f/u fromhospital she has recurrent admission due to CHF exacerbation requires IV diuretic . Pt states since cardiologychange her meds to cozaar and toprol she feels little better less sob she also c/o generalized weakness and diffused joint pain hands wrists knees   Review of Systems    Objective:      Physical Exam  Vitals and nursing note reviewed.   Constitutional:       General: She is not in acute distress.     Appearance: She is well-developed.   HENT:      Head: Normocephalic and atraumatic.      Right Ear: External ear normal.      Left Ear: External ear normal.      Nose: Nose normal.      Mouth/Throat:      Pharynx: No oropharyngeal exudate.   Eyes:      General:         Right eye: No discharge.         Left eye: No discharge.      Conjunctiva/sclera: Conjunctivae normal.      Pupils: Pupils are equal, round, and reactive to light.   Neck:      Thyroid: No thyromegaly.   Cardiovascular:      Rate and Rhythm: Normal rate and regular rhythm.      Heart sounds: Normal heart sounds. No murmur heard.    No friction rub. No gallop.   Pulmonary:      Effort: Pulmonary effort is normal. No respiratory distress.      Breath sounds: Normal breath sounds. No wheezing.   Abdominal:      General: Bowel sounds are normal. There is no distension.      Palpations: Abdomen is soft.      Tenderness: There is no abdominal tenderness.   Musculoskeletal:         General: Tenderness (subjectiev tenderness hands wrists and shoulder bilaterally) present. No deformity. Normal range of motion.      Cervical back: Normal range of motion and neck supple.   Lymphadenopathy:      Cervical: No cervical adenopathy.   Skin:     General: Skin is warm and dry.      Findings: No erythema or rash.      Comments:  Plus 1 edema lower ext   Neurological:      Mental Status: She is alert and oriented to person, place, and time.   Psychiatric:         Thought Content: Thought content normal.         Judgment: Judgment normal.      Comments: Anxious sad       Assessment:       1. Automatic implantable cardioverter-defibrillator in situ    2. Ischemic cardiomyopathy    3. Chronic combined systolic and diastolic congestive heart failure    4. Chronic fatigue    5. Hypoalbuminemia          Plan:       Automatic implantable cardioverter-defibrillator in situ    Ischemic cardiomyopathy  Comments:  continue with tx as per [facility] fluid restriction     Chronic combined systolic and diastolic congestive heart failure  Comments:  pt ha sappt with transplant clinic?    Chronic fatigue  -     cyanocobalamin injection 2,000 mcg    Hypoalbuminemia  -     Comprehensive Metabolic Panel; Future; Expected date: 09/20/2022  -     Magnesium; Future; Expected date: 09/20/2022  -     Urinalysis; Future; Expected date: 09/20/2022      Medication List with Changes/Refills   Current Medications    ACETAMINOPHEN (TYLENOL) 500 MG TABLET    Take 2 tablets (1,000 mg total) by mouth every 8 (eight) hours as needed for Pain.    ALBUTEROL (PROVENTIL/VENTOLIN HFA) 90 MCG/ACTUATION INHALER    Inhale 2 puffs into the lungs every 6 (six) hours as needed for Wheezing. Rescue    ALPRAZOLAM (XANAX) 0.5 MG TABLET    Take 1 tablet (0.5 mg total) by mouth nightly as needed for Anxiety.    ASPIRIN (ECOTRIN) 81 MG EC TABLET    Take 1 tablet (81 mg total) by mouth once daily.    CLOPIDOGREL (PLAVIX) 75 MG TABLET    Take 1 tablet (75 mg total) by mouth once daily.    COLLAGENASE (SANTYL) OINTMENT    Apply topically once daily.    DOCUSATE SODIUM (COLACE) 100 MG CAPSULE    Take 1 capsule (100 mg total) by mouth 2 (two) times daily.    EZETIMIBE (ZETIA) 10 MG TABLET    Take 1 tablet (10 mg total) by mouth every evening.    FAMOTIDINE (PEPCID) 20 MG TABLET    Take 1 tablet  (20 mg total) by mouth once daily.    FLUTICASONE PROPIONATE (FLONASE) 50 MCG/ACTUATION NASAL SPRAY    1 spray (50 mcg total) by Each Nostril route once daily.    FOOD SUPPLEMT, LACTOSE-REDUCED (ENSURE HIGH PROTEIN) LIQD    Take 240 mLs by mouth 2 (two) times a day.    FUROSEMIDE (LASIX) 40 MG TABLET    Take 1 tablet (40 mg total) by mouth 2 (two) times daily.    HYDROCODONE-ACETAMINOPHEN (NORCO) 5-325 MG PER TABLET    Take 1 tablet by mouth every 12 (twelve) hours as needed for Pain.    LOSARTAN (COZAAR) 25 MG TABLET    Take 1 tablet (25 mg total) by mouth once daily.    MAGNESIUM OXIDE (MAG-OX) 400 MG (241.3 MG MAGNESIUM) TABLET    Take 1 tablet (400 mg total) by mouth once daily.    METOPROLOL SUCCINATE (TOPROL-XL) 25 MG 24 HR TABLET    Take 1 tablet (25 mg total) by mouth once daily.    MIRTAZAPINE (REMERON) 15 MG TABLET    Take 1 tablet (15 mg total) by mouth every evening.    NITROGLYCERIN (NITROSTAT) 0.4 MG SL TABLET    Place 1 tablet (0.4 mg total) under the tongue every 5 (five) minutes as needed for Chest pain.    ONDANSETRON (ZOFRAN-ODT) 8 MG TBDL    Take 1 tablet (8 mg total) by mouth every 12 (twelve) hours as needed (nausea).    POLYETHYLENE GLYCOL (GLYCOLAX) 17 GRAM/DOSE POWDER    Take 17 g by mouth once daily.    PROMETHAZINE (PHENERGAN) 25 MG TABLET    Take 1 tablet (25 mg total) by mouth every 6 (six) hours as needed for Nausea.    SIMETHICONE (MYLICON) 80 MG CHEWABLE TABLET    Take 1 tablet (80 mg total) by mouth every 8 (eight) hours as needed for Flatulence.    TOLVAPTAN (SAMSCA) 30 MG TAB    Take 1 tablet (30 mg total) by mouth every Tues, Thurs.    TRAMADOL (ULTRAM) 50 MG TABLET    Take 1 tablet (50 mg total) by mouth every 6 (six) hours as needed for Pain.

## 2022-09-27 PROBLEM — I50.42 HEART FAILURE, SYSTOLIC AND DIASTOLIC, CHRONIC: Status: ACTIVE | Noted: 2022-01-01
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
## 2022-09-29 NOTE — PROGRESS NOTES
Subjective:      Patient ID: Odette Hart is a 67 y.o. female.    Chief Complaint: No chief complaint on file.    HPI:    [employer] AICD remote interrogation report downloaded and prepared by medical assistant and interpreted by me and full report scanned into Epic media.  BatteryOK with JOSE 4 years. Leads OK.  Minor nonsustained events noted. No therapy given.  Normal device function    Past Medical History:   Diagnosis Date    Acute coronary syndrome     Acute exacerbation of chronic obstructive pulmonary disease (COPD) 3/23/2022    Allergy     Arthritis     Cardiomyopathy     CHF (congestive heart failure)     Coronary artery disease     Coronary artery disease of native artery of native heart with stable angina pectoris 2021: [facility]: Cath: LAD: Proximal stent patent. LCX: Proximal 80%. LCX: Dominant. Moderate disease. LCX: HEVER 2.75 x 18 mm. Distal dissection. HEVER 2.75 x 22 mm.     Diverticulitis     Diverticulosis     Familial hypercholesterolemia 2022    Former smoker 2022    Heart attack     Heart disease     History of myocardial infarction 2022    Hyperlipidemia     Hypertension     Hypertension 2022    ICD (implantable cardioverter-defibrillator) in place     Non-ST elevation myocardial infarction (NSTEMI) 2019        Past Surgical History:   Procedure Laterality Date    APPENDECTOMY N/A 2022    Procedure: APPENDECTOMY;  Surgeon: Rafa Cardozo MD;  Location: [facility] OR [address] [place] [address];  Service: Colon and Rectal;  Laterality: N/A;    ATRIAL CARDIAC PACEMAKER INSERTION      CECECTOMY N/A 2022    Procedure: EXCISION, CECUM;  Surgeon: Rafa Cardozo MD;  [facility];  Service: Colon and Rectal;  Laterality: N/A;     SECTION      CHOLECYSTECTOMY N/A 2022    Procedure: CHOLECYSTECTOMY;  Surgeon: Doug Epstein MD;  [facility];  Service: General;  Laterality: N/A;    COLONOSCOPY N/A 2022     Procedure: COLONOSCOPY;  Surgeon: Rafa Cardozo MD;  Location: [facility] OR [address] [place] [address];  Service: Colon and Rectal;  Laterality: N/A;    COLOSTOMY  2022    Procedure: CREATION, COLOSTOMY;  Surgeon: Rafa Cardozo MD;  [facility];  Service: Colon and Rectal;;    CORONARY STENT PLACEMENT      LEFT HEART CATHETERIZATION Left 2022    Procedure: CATHETERIZATION, HEART, LEFT;  Surgeon: Yohannes Cordova MD;  Location: [facility] CATH LAB;  Service: Cardiology;  Laterality: Left;       Family History   Problem Relation Age of Onset    Heart disease Mother     Emphysema Father     Heart attack Brother        Social History     Socioeconomic History    Marital status:    Tobacco Use    Smoking status: Former     Packs/day: 0.50     Types: Cigarettes     Start date: 1976     Quit date: 2012     Years since quittin.8    Smokeless tobacco: Never   Substance and Sexual Activity    Alcohol use: No    Drug use: No     Social Determinants of Health     Financial Resource Strain: Low Risk     Difficulty of Paying Living Expenses: Not hard at all   Food Insecurity: No Food Insecurity    Worried About Running Out of Food in the Last Year: Never true    Ran Out of Food in the Last Year: Never true   Transportation Needs: No Transportation Needs    Lack of Transportation (Medical): No    Lack of Transportation (Non-Medical): No   Physical Activity: Inactive    Days of Exercise per Week: 0 days    Minutes of Exercise per Session: 0 min   Stress: No Stress Concern Present    Feeling of Stress : Not at all   Social Connections: Socially Isolated    Frequency of Communication with Friends and Family: More than three times a week    Frequency of Social Gatherings with Friends and Family: Once a week    Attends [employer] Services: Never    Active Member of Clubs or Organizations: No    Attends Club or Organization Meetings: Never    Marital Status:    Housing Stability: Low Risk     Unable to Pay  for Housing in the Last Year: No    Number of Places Lived in the Last Year: 1    Unstable Housing in the Last Year: No       Current Outpatient Medications on File Prior to Visit   Medication Sig Dispense Refill    acetaminophen (TYLENOL) 500 MG tablet Take 2 tablets (1,000 mg total) by mouth every 8 (eight) hours as needed for Pain.  0    albuterol (PROVENTIL/VENTOLIN HFA) 90 mcg/actuation inhaler Inhale 2 puffs into the lungs every 6 (six) hours as needed for Wheezing. Rescue 18 g 1    ALPRAZolam (XANAX) 0.5 MG tablet Take 1 tablet (0.5 mg total) by mouth nightly as needed for Anxiety. 30 tablet 1    aspirin (ECOTRIN) 81 MG EC tablet Take 1 tablet (81 mg total) by mouth once daily. 90 tablet 3    clopidogreL (PLAVIX) 75 mg tablet Take 1 tablet (75 mg total) by mouth once daily. 90 tablet 3    collagenase (SANTYL) ointment Apply topically once daily. 90 g 1    docusate sodium (COLACE) 100 MG capsule Take 1 capsule (100 mg total) by mouth 2 (two) times daily. 60 capsule 1    empagliflozin (JARDIANCE) 10 mg tablet Take 1 tablet (10 mg total) by mouth once daily. 30 tablet 11    ezetimibe (ZETIA) 10 mg tablet Take 1 tablet (10 mg total) by mouth every evening. 90 tablet 3    famotidine (PEPCID) 20 MG tablet Take 1 tablet (20 mg total) by mouth once daily. 30 tablet 0    fluticasone propionate (FLONASE) 50 mcg/actuation nasal spray 1 spray (50 mcg total) by Each Nostril route once daily. 9.9 mL 1    food supplemt, lactose-reduced (ENSURE HIGH PROTEIN) Liqd Take 240 mLs by mouth 2 (two) times a day. 60 each 3    furosemide (LASIX) 40 MG tablet Take 1 tablet (40 mg total) by mouth 2 (two) times daily. 220 tablet 3    HYDROcodone-acetaminophen (NORCO) 5-325 mg per tablet Take 1 tablet by mouth every 12 (twelve) hours as needed for Pain. 14 tablet 0    losartan (COZAAR) 25 MG tablet Take 1 tablet (25 mg total) by mouth once daily. 90 tablet 3    magnesium oxide (MAG-OX) 400 mg (241.3 mg magnesium) tablet Take 1 tablet  (400 mg total) by mouth once daily. 30 tablet 0    metoprolol succinate (TOPROL-XL) 25 MG 24 hr tablet Take 1 tablet (25 mg total) by mouth once daily. 90 tablet 3    mirtazapine (REMERON) 15 MG tablet Take 1 tablet (15 mg total) by mouth every evening. 30 tablet 11    nitroGLYCERIN (NITROSTAT) 0.4 MG SL tablet Place 1 tablet (0.4 mg total) under the tongue every 5 (five) minutes as needed for Chest pain. 25 tablet 3    ondansetron (ZOFRAN-ODT) 8 MG TbDL Take 1 tablet (8 mg total) by mouth every 12 (twelve) hours as needed (nausea). 20 tablet 2    polyethylene glycol (GLYCOLAX) 17 gram/dose powder Take 17 g by mouth once daily. 510 g 1    promethazine (PHENERGAN) 25 MG tablet Take 1 tablet (25 mg total) by mouth every 6 (six) hours as needed for Nausea. 15 tablet 0    simethicone (MYLICON) 80 MG chewable tablet Take 1 tablet (80 mg total) by mouth every 8 (eight) hours as needed for Flatulence. 120 tablet 5    spironolactone (ALDACTONE) 25 MG tablet Take 0.5 tablets (12.5 mg total) by mouth once daily. 45 tablet 3    traMADoL (ULTRAM) 50 mg tablet Take 1 tablet (50 mg total) by mouth every 6 (six) hours as needed for Pain. 12 tablet 0    [DISCONTINUED] pantoprazole (PROTONIX) 40 MG tablet Take 1 tablet (40 mg total) by mouth once daily. 30 tablet 1    [DISCONTINUED] prasugreL (EFFIENT) 10 mg Tab Take 1 tablet (10 mg total) by mouth once daily. 90 tablet 0     No current facility-administered medications on file prior to visit.       Review of patient's allergies indicates:   Allergen Reactions    Augmentin [amoxicillin-pot clavulanate] Swelling     Not allergic to amoxicillin just a derivative of augmentin    Lisinopril Other (See Comments)     Fluid around heart    Shellfish containing products Anaphylaxis     Pt.states she is allergic to SEAFOOD since the age of 13    Levaquin [levofloxacin] Other (See Comments)     Very bad joint pain    Clindamycin Palpitations     Chest pain     Objective:   There were no  vitals filed for this visit.     Physical Exam     Assessment:     1. Automatic implantable cardioverter-defibrillator in situ      Plan:   Diagnoses and all orders for this visit:    Automatic implantable cardioverter-defibrillator in situ       No follow-ups on file.

## 2022-10-03 NOTE — PROGRESS NOTES
Pt did not come to he scheduled appts today. Notified scheduling team to determine interest in rescheduling consult appts.

## 2022-10-11 NOTE — PROGRESS NOTES
[facility]  Colon and Rectal Surgery    [address]  [place], [place]  Tel: [phone]  Fax: [phone]  [email or web address]   MD Wesley Fink MD Brian Kann, MD W. Forrest Johnston, MD Matthew Giglia, MD Jennifer Paruch, MD William Kethman, MD Danielle Kay, MD     Patient name: Odette Hart   YOB: 1955   MRN: [id number]  Date of visit: 10/11/2022    Dear Dr. Barragan,    It was a pleasure seeing Ms. Hart in the Colon and Rectal Surgery clinic here at [facility].     Ms. 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 Epstein) on 5/9/2022, complicated by readmission for management of shortness of breath/HF exacerbation, severe malnutrition, and wound care. She has been discharged and is here for follow-up.    Pathology  A.   APPENDIX, APPENDECTOMY:          -Benign appendix, without diagnostic abnormality.          -Attached portion of benign colon, without diagnostic abnormality.          -Proximal margin, negative for tumor.          -Negative for malignancy.   B.   COLON, SIGMOID AND PORTION OF CECUM, SIGMOID COLECTOMY:          -Portions of benign colon showing diverticulitis with associated mural wall thickening, serosal hemorrhage and adhesions (clinical, colovesical/colovaginal fistula).          -Surgical margins, viable benign colon, without diagnostic abnormality.          -Negative for malignancy.   C.   GALLBLADDER, CHOLECYSTECTOMY:          -Mild chronic cholecystitis.     She is walking on her own more, still spirits are low. She is  developing more of an appetite - but not perfect. She is having only intermittent nausea, no vomiting. She gained 3 lbs. She has been seeing wound care and they are marking some progress on the sacral wound.     8/12/2022  Here today - she was recently readmitted for heart failure management which has been a recurrent issue for her post-operatively (dfificult to manage) - our service followed to assist in wound care. She is going to see our ostomy care nurses today as well. She seems to be doing better - wearing makeup today. She is having seldom nausea, no vomiting. Her wounds do seem to be making progress. She is still titrating cardiac medications.    10/11/2022  Here for follow-up. Continues to undergo aggressive wound care of abdominal and sacral wounds - slowed due to co-morbidities. She is understandable frustrated by the pace of healing of her wounds     Albumin 1.6-2.3  The patient was informed of the availability of a certified  without charge. A certified  was not necessary for this visit.    Review of Systems  See pertinent review of systems above    Past Medical History:   Diagnosis Date    Acute coronary syndrome     Acute exacerbation of chronic obstructive pulmonary disease (COPD) 3/23/2022    Allergy     Arthritis     Cardiomyopathy     CHF (congestive heart failure)     Coronary artery disease     Coronary artery disease of native artery of native heart with stable angina pectoris 4/19/2021 1/24/2022: [facility]: Cath: LAD: Proximal stent patent. LCX: Proximal 80%. LCX: Dominant. Moderate disease. LCX: HEVER 2.75 x 18 mm. Distal dissection. HEVER 2.75 x 22 mm.     Diverticulitis     Diverticulosis     Familial hypercholesterolemia 1/22/2022    Former smoker 1/24/2022    Heart attack     Heart disease     History of myocardial infarction 1/24/2022    Hyperlipidemia     Hypertension     Hypertension 1/24/2022    ICD (implantable cardioverter-defibrillator) in place     Non-ST  elevation myocardial infarction (NSTEMI) 2019     Past Surgical History:   Procedure Laterality Date    APPENDECTOMY N/A 2022    Procedure: APPENDECTOMY;  Surgeon: Rafa Cardozo MD;  Location: [facility] FLR;  Service: Colon and Rectal;  Laterality: N/A;    ATRIAL CARDIAC PACEMAKER INSERTION      CECECTOMY N/A 2022    Procedure: EXCISION, CECUM;  Surgeon: Rafa Cardozo MD;  Location: [facility] OR [facility] FLR;  Service: Colon and Rectal;  Laterality: N/A;     SECTION      CHOLECYSTECTOMY N/A 2022    Procedure: CHOLECYSTECTOMY;  Surgeon: Doug Epstein MD;  Location: [facility] OR [facility] FLR;  Service: General;  Laterality: N/A;    COLONOSCOPY N/A 2022    Procedure: COLONOSCOPY;  Surgeon: Rafa Cardozo MD;  Location: [facility] OR [facility] FLR;  Service: Colon and Rectal;  Laterality: N/A;    COLOSTOMY  2022    Procedure: CREATION, COLOSTOMY;  Surgeon: Rafa Cardozo MD;  [facility];  Service: Colon and Rectal;;    CORONARY STENT PLACEMENT      LEFT HEART CATHETERIZATION Left 2022    Procedure: CATHETERIZATION, HEART, LEFT;  Surgeon: Yohannes Cordova MD;  Location: [facility] CATH LAB;  Service: Cardiology;  Laterality: Left;     Family History   Problem Relation Age of Onset    Heart disease Mother     Emphysema Father     Heart attack Brother      Social History     Tobacco Use    Smoking status: Former     Packs/day: 0.50     Types: Cigarettes     Start date: 1976     Quit date: 2012     Years since quittin.8    Smokeless tobacco: Never   Substance Use Topics    Alcohol use: No    Drug use: No     Review of patient's allergies indicates:   Allergen Reactions    Augmentin [amoxicillin-pot clavulanate] Swelling     Not allergic to amoxicillin just a derivative of augmentin    Lisinopril Other (See Comments)     Fluid around heart    Shellfish containing products Anaphylaxis     Pt.states she is allergic to SEAFOOD since the age of 13    Levaquin [levofloxacin]  Other (See Comments)     Very bad joint pain    Clindamycin Palpitations     Chest pain       Current Outpatient Medications on File Prior to Visit   Medication Sig Dispense Refill    acetaminophen (TYLENOL) 500 MG tablet Take 2 tablets (1,000 mg total) by mouth every 8 (eight) hours as needed for Pain.  0    albuterol (PROVENTIL/VENTOLIN HFA) 90 mcg/actuation inhaler Inhale 2 puffs into the lungs every 6 (six) hours as needed for Wheezing. Rescue 18 g 1    ALPRAZolam (XANAX) 0.5 MG tablet Take 1 tablet (0.5 mg total) by mouth nightly as needed for Anxiety. 30 tablet 1    aspirin (ECOTRIN) 81 MG EC tablet Take 1 tablet (81 mg total) by mouth once daily. 90 tablet 3    clopidogreL (PLAVIX) 75 mg tablet Take 1 tablet (75 mg total) by mouth once daily. 90 tablet 3    collagenase (SANTYL) ointment Apply topically once daily. 90 g 1    docusate sodium (COLACE) 100 MG capsule Take 1 capsule (100 mg total) by mouth 2 (two) times daily. 60 capsule 1    empagliflozin (JARDIANCE) 10 mg tablet Take 1 tablet (10 mg total) by mouth once daily. 30 tablet 11    ezetimibe (ZETIA) 10 mg tablet Take 1 tablet (10 mg total) by mouth every evening. 90 tablet 3    famotidine (PEPCID) 20 MG tablet Take 1 tablet (20 mg total) by mouth once daily. 30 tablet 0    fluticasone propionate (FLONASE) 50 mcg/actuation nasal spray 1 spray (50 mcg total) by Each Nostril route once daily. 9.9 mL 1    food supplemt, lactose-reduced (ENSURE HIGH PROTEIN) Liqd Take 240 mLs by mouth 2 (two) times a day. 60 each 3    furosemide (LASIX) 40 MG tablet Take 1 tablet (40 mg total) by mouth 2 (two) times daily. 220 tablet 3    losartan (COZAAR) 25 MG tablet Take 1 tablet (25 mg total) by mouth once daily. 90 tablet 3    magnesium oxide (MAG-OX) 400 mg (241.3 mg magnesium) tablet Take 1 tablet (400 mg total) by mouth once daily. 30 tablet 0    metoprolol succinate (TOPROL-XL) 25 MG 24 hr tablet Take 1 tablet (25 mg total) by mouth once daily. 90 tablet 3     mirtazapine (REMERON) 15 MG tablet Take 1 tablet (15 mg total) by mouth every evening. 30 tablet 11    nitroGLYCERIN (NITROSTAT) 0.4 MG SL tablet Place 1 tablet (0.4 mg total) under the tongue every 5 (five) minutes as needed for Chest pain. 25 tablet 3    ondansetron (ZOFRAN-ODT) 8 MG TbDL Take 1 tablet (8 mg total) by mouth every 12 (twelve) hours as needed (nausea). 20 tablet 2    polyethylene glycol (GLYCOLAX) 17 gram/dose powder Take 17 g by mouth once daily. 510 g 1    promethazine (PHENERGAN) 25 MG tablet Take 1 tablet (25 mg total) by mouth every 6 (six) hours as needed for Nausea. 15 tablet 0    simethicone (MYLICON) 80 MG chewable tablet Take 1 tablet (80 mg total) by mouth every 8 (eight) hours as needed for Flatulence. 120 tablet 5    spironolactone (ALDACTONE) 25 MG tablet Take 0.5 tablets (12.5 mg total) by mouth once daily. 45 tablet 3    traMADoL (ULTRAM) 50 mg tablet Take 1 tablet (50 mg total) by mouth every 6 (six) hours as needed for Pain. 12 tablet 0    [DISCONTINUED] pantoprazole (PROTONIX) 40 MG tablet Take 1 tablet (40 mg total) by mouth once daily. 30 tablet 1    [DISCONTINUED] prasugreL (EFFIENT) 10 mg Tab Take 1 tablet (10 mg total) by mouth once daily. 90 tablet 0     No current facility-administered medications on file prior to visit.     Physical Examination  BP (!) 91/57 (BP Location: Left arm, Patient Position: Sitting)   Pulse 82   Wt 52.5 kg (115 lb 12.8 oz)   LMP  (LMP Unknown)   BMI 20.51 kg/m²      A chaperone was present for the physical examination.    Constitutional: well developed, no cough, some SOB - at baseline  Head: Normocephalic, no lesions, without obvious abnormality  Eye: Normal external eye, conjunctiva, and lids  Cardiovascular: regular rate and regular rhythm  Respiratory: normal air entry  Gastrointestinal: soft, non-tender, non-distended, lower extremities mildly swollen, hands cool    No fluctuance or abscess apreciated        Musculoskeletal: full range of  motion without pain  Neurologic: alert, oriented, normal speech, no focal findings or movement disorder noted  Psychiatric: appropriate, normal mood    Assessment and Plan of Care    Thank you again for referring Ms. Hart to my care. In summary, Ms. Hart is a 66 year old woman presenting for post-operative follow-up. We discussed treatment options and have provided the following recommendations:    Appreciate multi-discipline coordinated care regarding co-morbidities  We had a long discussion today about her wounds and my concern that she is not granulating well in her wound and is still having new sacral wound development - suspect this is indicative of systemic factors preventing expedious wound healing. We did have our wound and ostomy care nurse see her today but I suspect that we just need more time. We did discuss surgical intervention (attempt at wound debridement, etc.) but feel that this would only add additional stress to the process that might not be well tolerated due to ongoing tenuous cardiac status. For now, I feel that we need to be patient and will continue to re-evaluate.  Colonoscopy in 1 year (7/2023)   Follow-up in 2 months at [facility]    Please do not hesitate to contact me if you have any questions.      Rafa Cardozo MD - Staff Surgeon  Department of Colon & Rectal Surgery  [facility]

## 2022-10-11 NOTE — PROGRESS NOTES
This patient is known to me and is here in clinic today for  second post op evaluation related to colostomy. Surgery done  by Dr. Cardozo. The patient reports no more problems with colostomy. The patient igoing to [facility] for wound care.  She is getting bit stronger, but still debilitated, comes with son today    The colostomy is 28 mm gloria medium budded stoma.  The patient is currently  wearing a 1piece CLOSED END pouching system by  [employer]  Average wear time is 1-2 days.   Peristomal skin is clear    SUPPLIES/DME:[facility]     Pouching concerns include:         Aug       Oct 11,22    Wound care being done at [facility] packing with strip and guaze only , very minor progress  She is on fluid restriction due to heart failure and trying to take 2 AJIT a day but hard.  Have suggested trying WOUNDVITE     Also send son with alginate ag to suggest to wound team . To use at least 2 weeks every other month or so as this wound will take many months to heal   SPECIAL NEEDS:  Pt counseled on skin care, nutrition, hydration as well as  how to order ostomy supplies.  I spent greater than 50% of this 45 minute visit in face to face counseling.

## 2022-10-14 NOTE — TELEPHONE ENCOUNTER
[facility] faxed over authorization confirmation by referring provider in regards to wound care. authorization #[id number]    Good 9/16/22 - 11/16/11 - 12 authorized count - procedure code WC proc Desc outpatient wound care approved on [facility]out[facility]. Requested from Nirmala Ramos DPBUCKY on 10-6-22 for drainage 1.5 cm to 3 oclock, 5 cm to 6 oclock, 3 cm to 12 oclock, sacral wound measuring 0.5x0.32 cm with undermining to subcutaneous tissue approx 2.5 cm to 12 oclock    Uploaded to

## 2022-10-17 NOTE — TELEPHONE ENCOUNTER
Left a detailed message on voice mail.  Message sent to patient thru the portal.      ----- Message from Yohannes Cordova MD sent at 10/13/2022  5:07 PM CDT -----  How is she feeling?    Labs overall a little better.    Cliff with plan.    Yohannes Cordova M.D.

## 2022-10-17 NOTE — TELEPHONE ENCOUNTER
Spoke to patient, overall feels better, no swelling, BP fine, still gets tired.        ----- Message from Ashley Sanchez sent at 10/17/2022  1:51 PM CDT -----    ----- Message -----  From: Snehal Smith  Sent: 10/17/2022  11:30 AM CDT  To: Tasha Sheffield Staff    Name of Who is Calling: JERE GARCIAS [[id number]]              What is the request in detail: Pt returning Charu's call. Requesting a call back              Can the clinic reply by [facility]: No              What Number to Call Back if not in [facility]:  [phone]

## 2022-10-17 NOTE — TELEPHONE ENCOUNTER
Patient states overall feels better, no swelling, BP fine, gets tired.           ----- Message from Yohannes Cordova MD sent at 10/13/2022  5:07 PM CDT -----  How is she feeling?    Labs overall a little better.    Cliff with plan.    Yohannes Cordova M.D.

## 2022-10-20 NOTE — TELEPHONE ENCOUNTER
Odette Hart  1955 was referred to the [facility] at [facility]. Multiple attempts have been made to contact and/ or schedule the patient for a visit in our clinic. These attempts have been unsuccessful. We are closing the referral at this time;  however we welcome you to re-refer the patient as appropriate. Should you decide to place another referral, please fax a new referral request along with updated records to [phone]. A copy of this letter will also be sent to the patient at the address that we have on file. For questions, please don't hesitate to call us at [phone].
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
## 2022-10-27 NOTE — SUBJECTIVE & OBJECTIVE
Subjective:     Interval History: no acute events overnite, minimal abd pain, no n/v    Post-Op Info:  * No surgery found *          Medications:  Continuous Infusions:   dextrose 10 % in water (D10W)      dextrose 5 % and 0.9 % NaCl 75 mL/hr at 04/20/22 1917    Standard Custom Day One ADULT TPN for patient with NO electrolyte abnormality or NO renal dysfunction (PERIPHERAL)       Scheduled Meds:   enoxaparin  40 mg Subcutaneous Daily    fat emulsion 20%  250 mL Intravenous Daily    piperacillin-tazobactam (ZOSYN) IVPB  4.5 g Intravenous Q8H     PRN Meds:   acetaminophen    dextrose 10 % in water (D10W)    dextrose 10%    glucagon (human recombinant)    insulin aspart U-100    iohexol    naloxone    ondansetron    prochlorperazine    sodium chloride 0.9%        Objective:     Vital Signs (Most Recent):  Temp: 97.9 °F (36.6 °C) (04/21/22 0858)  Pulse: 69 (04/21/22 0858)  Resp: (!) 22 (04/21/22 0858)  BP: (!) 101/55 (04/21/22 0858)  SpO2: 96 % (04/21/22 0858)   Vital Signs (24h Range):  Temp:  [96.3 °F (35.7 °C)-97.9 °F (36.6 °C)] 97.9 °F (36.6 °C)  Pulse:  [65-82] 69  Resp:  [16-22] 22  SpO2:  [92 %-100 %] 96 %  BP: ()/(52-55) 101/55     Intake/Output - Last 3 Shifts         04/19 0700  04/20 0659 04/20 0700  04/21 0659 04/21 0700 04/22 0659    IV Piggyback  884.3     Total Intake(mL/kg)  884.3 (16.7)     Net  +884.3            Stool Occurrence  0 x             Physical Exam  Vitals and nursing note reviewed.   Constitutional:       General: She is not in acute distress.     Appearance: She is well-developed. She is ill-appearing.   HENT:      Head: Normocephalic.   Eyes:      Pupils: Pupils are equal, round, and reactive to light.   Cardiovascular:      Rate and Rhythm: Normal rate and regular rhythm.      Heart sounds: Normal heart sounds.   Pulmonary:      Effort: Pulmonary effort is normal. No respiratory distress.      Breath sounds: Normal breath sounds. No wheezing or rales.   Abdominal:       Anesthesia Post Evaluation    Patient: Eleni Jj    Procedure(s) Performed: Procedure(s) (LRB):  REMOVAL, IMPLANT, BREAST (CASH) (Bilateral)  INSERTION, BREAST IMPLANT (CASH // replacement with saline implants) (Bilateral)    Final Anesthesia Type: general      Patient location during evaluation: OPS  Patient participation: Yes- Able to Participate  Level of consciousness: awake and oriented  Post-procedure vital signs: reviewed and stable  Pain management: adequate  Airway patency: patent    PONV status at discharge: No PONV  Anesthetic complications: no      Cardiovascular status: stable and hemodynamically stable  Respiratory status: unassisted and spontaneous ventilation  Hydration status: euvolemic  Follow-up not needed.          Vitals Value Taken Time   /66 10/27/22 1013   Temp 36 °C (96.8 °F) 10/27/22 0830   Pulse 75 10/27/22 1013   Resp 18 10/27/22 0914   SpO2 97 % 10/27/22 1013         Event Time   Out of Recovery 09:14:00         Pain/Adria Score: Pain Rating Prior to Med Admin: 0 (10/27/2022  8:45 AM)  Adria Score: 9 (10/27/2022  9:20 AM)  Modified Adria Score: 20 (10/27/2022 10:28 AM)         Palpations: Abdomen is soft. There is no mass.      Tenderness: There is no guarding or rebound.   Skin:     General: Skin is warm and dry.   Neurological:      Mental Status: She is alert and oriented to person, place, and time.   Psychiatric:         Behavior: Behavior normal.         Thought Content: Thought content normal.         Judgment: Judgment normal.           Significant Labs:  BMP (Last 3 Results):   Recent Labs   Lab 04/18/22 2009 04/20/22 2134 04/21/22  0629   * 99 85   * 136 136   K 4.1 4.4 3.9   CL 98 100 102   CO2 24 28 25   BUN 10 8 7*   CREATININE 0.7 0.6 0.7   CALCIUM 8.6* 8.4* 8.3*     CBC (Last 3 Results):   Recent Labs   Lab 04/18/22 2009 04/20/22  1713 04/21/22  0629   WBC 2.90* 3.20* 3.08*   RBC 3.91* 4.16 4.01   HGB 10.9* 11.4* 10.6*   HCT 32.7* 34.9* 33.6*    205 265   MCV 84 84 84   MCH 27.9 27.4 26.4*   MCHC 33.3 32.7 31.5*     CRP: No results for input(s): CRP in the last 168 hours.  Prealbumin:   Recent Labs   Lab 04/20/22 2134   PREALBUMIN 7*       Significant Diagnostics:  CT a/p today

## 2022-11-03 NOTE — TELEPHONE ENCOUNTER
----- Message from Binta Morales sent at 11/3/2022  3:01 PM CDT -----  Contact: [phone]  Patient is returning a phone call.  Who left a message for the patient: ???  Does patient know what this is regarding:    Would you like a call back, or a response through your [facility] portal?:   call back  Comments:

## 2022-11-03 NOTE — TELEPHONE ENCOUNTER
The patient was making sure that she is able to see the Ortho here. I let her know she's on the wait list incase they can get her in sooner.

## 2022-11-09 NOTE — TELEPHONE ENCOUNTER
----- Message from Conchita Shelley sent at 11/9/2022 11:25 AM CST -----  Regarding: Appt  Contact: pt @ [phone]  Pt have appt in December, asking for sooner appt. Feel the need to be seen today. Asking for a call back

## 2022-11-09 NOTE — TELEPHONE ENCOUNTER
Spoke with patient, offered sooner appointment with Elaine and patient declined. Stated that outpatient wound care sent a message to Dr Cardozo. Patient ended call.

## 2022-11-16 NOTE — TELEPHONE ENCOUNTER
Patient notified and verbalized understanding.      ----- Message from Yohannes Cordova MD sent at 11/16/2022  1:28 PM CST -----  Sodium now normal which is a very favorable and consistent with that she is overall doing better.    Stay on current regimen.    Yohannes Cordova M.D.

## 2022-12-05 NOTE — TELEPHONE ENCOUNTER
Spoke with patient and told her to go to the ER to make sure there is no blood clot present. Patient stated understanding

## 2022-12-05 NOTE — TELEPHONE ENCOUNTER
----- Message from Martha Cruz sent at 12/5/2022  3:53 PM CST -----  Contact: Pt [phone]  [email or web address]     Patient is calling for Medical Advice regarding: veins popping out on legs/ankle/pain/swollen    How long has patient had these symptoms: 4 days    Pharmacy name and phone#:   [facility] [address] [place] [facility] [address]  [address] [facility]  [place] [address]  Phone: [phone] Fax: [phone]    Would like response via [employer]:  Call back    Comments:    Please call and advise.    Thank You

## 2022-12-05 NOTE — TELEPHONE ENCOUNTER
I spoke with the patient and she said she called the ED and they told her they cannot treat her in the ER. She said she's been having swelling and pain in the legs since Friday, but they are turning blue now. I let her know our medical recommendation is to go to the ED ASAP for immediate evaluation and treatment as that sounds very serious. Patient said she will try. I reiterated again that she really needs to go to the ED.

## 2022-12-05 NOTE — TELEPHONE ENCOUNTER
----- Message from Joy Aiken sent at 12/5/2022  4:44 PM CST -----  Contact: [phone]  Pt does not want to go to ER b/c she is having problems with the treatment the ER is giving there. She states ER told her they are not there to treat illness they are there to save lives. She called the ER and was told Dr Barragan can just order a test for her to have to check for a blood clot. She states her ankle is swollen, her veins in legs are painful and swollen, and her foot is swollen. Can you please call her to discuss? Thanks

## 2022-12-06 NOTE — TELEPHONE ENCOUNTER
The patient called in to say she's still in the ER. They did an X-Ray of the chest, but she's still waiting for her labs and any additional testing. Patient hasn't made it back to the room yet. She's still in pain and can barely walk. I let her know we can;t order anything from here, but she can ask the reception if they can check to see when she's going back to a room and I have her down to see us tomorrow.

## 2022-12-07 PROBLEM — M79.604 PAIN OF RIGHT LOWER EXTREMITY: Status: ACTIVE | Noted: 2022-01-01

## 2022-12-07 PROBLEM — R79.89 ELEVATED TROPONIN: Status: ACTIVE | Noted: 2022-01-01

## 2022-12-07 NOTE — TELEPHONE ENCOUNTER
Called pt back regarding her message.  Pt stated that she is currently admitted into the hospital and will not be able to come to her clinic visit today.  Pt would also like Dr. Cardozo to know that she has a wound vac in place that has been working very well for her.  RN will update Dr. Cardozo on pt's status and will call pt back soon to discuss getting her rescheduled in January.  Pt will need to decide if she would like to resume care with Dr. Cardozo at [facility] or not or stay at [facility] and establish care with Dr. Bejarano.  Pt verbalized understanding to all.

## 2022-12-08 PROBLEM — M79.604 PAIN OF RIGHT LOWER EXTREMITY: Status: RESOLVED | Noted: 2022-01-01 | Resolved: 2022-01-01

## 2022-12-09 NOTE — TELEPHONE ENCOUNTER
Reached out to patient. Scheduled appointment with Dr Cordova.    ----- Message from Ghada Randhawa sent at 12/9/2022 11:37 AM CST -----  Regarding: Patient call back  Who called: Anthony from [employer]     What is the request in detail: Patient is requesting a call back. She states pt was non compliant with the lasix which led to the hospital stay. She now needs a f/u in a week. Next available is 1/30. She would like  the pt to be called to schedule.   Please advise.    Can the clinic reply by [facility]? No    Best call back number: [phone]    Additional Information: N/A

## 2022-12-09 NOTE — TELEPHONE ENCOUNTER
Corresponded with pt a couple of times this afternoon.  Pt is concerned that her fistula will leak feces into her blood stream and cause her to become septic.  Pt also stated that she has the chills and she is cold.  Pt does not have access to get a thermometer at this time and does not have anyone to get her any extra blankets.  She also stated that she just wants to stay on the sofa and not move.  When asked if pt was eating and staying hydrated, pt reported that she had 3 chicken nuggets and some water.  Pt advised by Dr. Cardozo and RN to report to [facility] ED if possible for further evaluation over the weekend if pt gets concerned and her symptoms worsen.  Pt also made aware that wound care will not be in the hospital over the weekend and an appt has been made for her to see our wound care specialist on Monday at 1030am.  Pt asked if we had an uber contract and can provide her with transportation.  RN let pt know that her supervisor is not present for an answer at this time, but she would like for her to try and get her son to bring her on Monday if possible. Pt verbalized understanding to all.

## 2022-12-09 NOTE — TELEPHONE ENCOUNTER
----- Message from Katia Morales sent at 12/9/2022 11:29 AM CST -----  Contact: Ben maurice/[employer]'s [employer] [phone]  Ben is calling in regards to pt having a fistula between her abdominal wounds and an ostomy site. Ben is wanting to know if the pt needs to be seen today instead of on Monday. Pt is currently at home and on antibiotics. Please call pt back to confirm if she needs to be seen on today at [phone]          Thank you     
11

## 2022-12-10 NOTE — TELEPHONE ENCOUNTER
Spoke with Ms. Hart and wound care yesterday - saw her in person last Wednesday (wound vac was in place and no bilious output) - called yesterday by wound care after taking down wound vac revealed that she had a fistula. They placed a pouch over it and she was sent home.     Called her this morning - she is in better spirits. She is having stool from her ostomy (solid), less output from fistula (only about 100 mL described). She is taking a capful of Miralax daily, she is drinking more water today. We discussed plan of care - unfortunately, she will need time for us to better characterize her fistula and likely additional imaging.      Recommendations:  Continue monitoring output from fistula  Double the Miralax to twice daily  Continue hydration and eating  Plan to see her with wound care at [facility] early this coming week      Rafa Cardozo MD - Staff Surgeon  Department of Colon & Rectal Surgery  [facility]

## 2022-12-12 NOTE — PROGRESS NOTES
Spoke with and saw Ms. Hart and her son this morning - abdomen soft - wound had nearly closed before development of ECF just underneath skin bridge. She has made incredible progress nutritionally and from a heart failure perspective but she has had a long hard 8 months. We discussed that surgery would likely be necessary but that timing would be critical for her due to underlying co-morbidities.           Recommendations:  Continue monitoring output from fistula - will attempt better control today with wound  Continue hydration and eating - encourage Boost, will assess nutrition on Wednesday (CMP and Prealbumin ordered)  Plan to see her with wound care on Wednesday to reassess today's attempt at fistula control  CT AP with IV and oral contrast ordered  Echocardiogram to reassess cardiac function      Rafa Cardozo MD - Staff Surgeon  Department of Colon & Rectal Surgery  [facility]

## 2022-12-12 NOTE — PROGRESS NOTES
This patient is unknown to me and is here in clinic today for  third post op evaluation related to colostomy and newly developed EC fistula. Surgery done  by Dr. Cardozo. The patient reports no more problems with colostomy. The patient is going to [facility] for wound care, started on wound vac with significant improvements in abdominal wound, then last week started noticing fecal matter via wound..  She is getting bit stronger, comes with son today.     The colostomy is 28 mm gloria medium budded stoma.  The patient is currently  wearing a 1piece CLOSED END pouching system by Hailey  Average wear time is 1-2 days.   Peristomal skin is clear    SUPPLIES/DME:[facility]     Pouching concerns include:         Aug       Oct 11,22     12/12/22     Dr. Cardozo to bedside to assess. See his encounter notes. Labs, Echo, and CT to assess for possible surgical management.   Per Dr. Cardozo recs, paste to superior aspect of midline wound, Lynda pouch to inferior aspect and to smaller inferior lower opening.   Skin bridge between two opening making pouching difficult as fistula falls under skin bridge.   She is on fluid restriction due to heart failure and trying to take 2 AJIT a day but hard.  Have suggested trying WOUNDVITE        SPECIAL NEEDS:  Pt counseled on skin care, nutrition, hydration as well as  how to order ostomy supplies.  I spent greater than 50% of this 45 minute visit in face to face counseling.

## 2022-12-16 NOTE — PROGRESS NOTES
This patient is here in clinic today for another post op evaluation related to newly developed EC fistula. and colostomy Surgery done  by Dr. Cardozo.  She is getting bit stronger, comes with son today.does have leg pain and back pain today , walks slowly ,      The colostomy is 28 mm gloria medium budded stoma.  The patient is currently  wearing a 1piece CLOSED END pouching system by Uma  Average wear time is 1-2 days.   Peristomal skin is clear            12/16  soft stool from fistula is major stooling outlet, her colostomy has small pelet like pieces only and minimal.  Pouch leaked a few days ago she tried to pouch with closed end   TODAY I placed UMA NEW IMAGE 23/4 flange to this are, I plugged the lower hole with stoma paste and EBS  the cut flat wafer in a keyhole fashion and used stoma paste and powder on paste , showed them how to do this and advised they remove the pch to empty and not squeeze out the stool   Son will do this for her     12/12  She is doing better overall and gained some wt.  Taking WOUNDVITE   eating well  I will check her next week and see how this is going  Gave him 3 sets of supplies .

## 2022-12-19 NOTE — PROGRESS NOTES
Called and checked on her and the pouch placed on Friday is still intact, she says it smells and she wants her son to change the pouch ( only ) tonight, output is not much she states, and her last BM via stoma was yesterday and it was soft.   Sent script today to N to approve supplies for fistula

## 2022-12-22 PROBLEM — N39.0 URINARY TRACT INFECTION WITH PYURIA: Chronic | Status: RESOLVED | Noted: 2022-02-26 | Resolved: 2022-01-01

## 2022-12-22 PROBLEM — K63.2 COLOCUTANEOUS FISTULA: Status: ACTIVE | Noted: 2022-01-01

## 2022-12-22 NOTE — H&P
[facility]  Colon and Rectal Surgery    [address]  [place], [place]  Tel: [phone]  Fax: [phone]  [email or web address]   MD Wesley Fink MD Brian Kann, MD W. Forrest Johnston, MD Matthew Giglia, MD Jennifer Paruch, MD William Kethman, MD Danielle Kay, MD     Patient name: Odette Hart   YOB: 1955   MRN: [id number]  Date of visit: 12/22/2022    Ms. 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 Epstein) on 5/9/2022, complicated by readmission for management of shortness of breath/HF exacerbation, severe malnutrition, and wound care. She has been discharged and is here for follow-up.    Pathology  A.   APPENDIX, APPENDECTOMY:          -Benign appendix, without diagnostic abnormality.          -Attached portion of benign colon, without diagnostic abnormality.          -Proximal margin, negative for tumor.          -Negative for malignancy.   B.   COLON, SIGMOID AND PORTION OF CECUM, SIGMOID COLECTOMY:          -Portions of benign colon showing diverticulitis with associated mural wall thickening, serosal hemorrhage and adhesions (clinical, colovesical/colovaginal fistula).          -Surgical margins, viable benign colon, without diagnostic abnormality.          -Negative for malignancy.   C.   GALLBLADDER, CHOLECYSTECTOMY:          -Mild chronic cholecystitis.     She is walking on her own more, still spirits are low. She is developing more of an appetite - but not perfect. She is having only intermittent nausea, no vomiting. She gained 3 lbs. She has  been seeing wound care and they are marking some progress on the sacral wound.     8/12/2022  Here today - she was recently readmitted for heart failure management which has been a recurrent issue for her post-operatively (dfificult to manage) - our service followed to assist in wound care. She is going to see our ostomy care nurses today as well. She seems to be doing better - wearing makeup today. She is having seldom nausea, no vomiting. Her wounds do seem to be making progress. She is still titrating cardiac medications.    10/11/2022  Here for follow-up. Continues to undergo aggressive wound care of abdominal and sacral wounds - slowed due to co-morbidities. She is understandable frustrated by the pace of healing of her wounds     Albumin 1.6-2.3    12/22/2022  Despite white sponge on low pressure, unfortunately, 7 months after her index operation she developed a colocutaneous fistula - confirmed on CT. She did have mild prolapse causing issues with pouching and so we directly admitted her to work on control and nutritional optimization. She is not having and fevers, nausea, or vomiting. She is drinking Boosts at home and is much more active.     Albumin 2.2, Prealbumin 10-11    The patient was informed of the availability of a certified  without charge. A certified  was not necessary for this visit.    Review of Systems  See pertinent review of systems above    Past Medical History:   Diagnosis Date    Acute coronary syndrome     Acute exacerbation of chronic obstructive pulmonary disease (COPD) 3/23/2022    Allergy     Arthritis     Cardiomyopathy     CHF (congestive heart failure)     Coronary artery disease     Coronary artery disease of native artery of native heart with stable angina pectoris 4/19/2021 1/24/2022: [facility]: Cath: LAD: Proximal stent patent. LCX: Proximal 80%. LCX: Dominant. Moderate disease. LCX: HEVER 2.75 x 18 mm. Distal dissection. HEVER 2.75 x 22 mm.      Diverticulitis     Diverticulosis     Familial hypercholesterolemia 2022    Former smoker 2022    Heart attack     Heart disease     History of myocardial infarction 2022    Hyperlipidemia     Hypertension     Hypertension 2022    ICD (implantable cardioverter-defibrillator) in place     Non-ST elevation myocardial infarction (NSTEMI) 2019     Past Surgical History:   Procedure Laterality Date    APPENDECTOMY N/A 2022    Procedure: APPENDECTOMY;  Surgeon: Rafa Cardozo MD;  Location: [facility] OR 2ND FLR;  Service: Colon and Rectal;  Laterality: N/A;    ATRIAL CARDIAC PACEMAKER INSERTION      CECECTOMY N/A 2022    Procedure: EXCISION, CECUM;  Surgeon: Rafa Cardozo MD;  Location: [facility] OR [facility] FLR;  Service: Colon and Rectal;  Laterality: N/A;     SECTION      CHOLECYSTECTOMY N/A 2022    Procedure: CHOLECYSTECTOMY;  Surgeon: Doug Epstein MD;  Location: [facility] OR [facility] FLR;  Service: General;  Laterality: N/A;    COLONOSCOPY N/A 2022    Procedure: COLONOSCOPY;  Surgeon: Rafa Cardozo MD;  Location: [facility] OR 2ND FLR;  Service: Colon and Rectal;  Laterality: N/A;    COLOSTOMY  2022    Procedure: CREATION, COLOSTOMY;  Surgeon: Rafa Cardozo MD;  Location: [facility] OR 2ND FLR;  Service: Colon and Rectal;;    CORONARY STENT PLACEMENT      LEFT HEART CATHETERIZATION Left 2022    Procedure: CATHETERIZATION, HEART, LEFT;  Surgeon: Yohannes Cordova MD;  Location: [facility] CATH LAB;  Service: Cardiology;  Laterality: Left;     Family History   Problem Relation Age of Onset    Heart disease Mother     Emphysema Father     Heart attack Brother      Social History     Tobacco Use    Smoking status: Former     Packs/day: 0.50     Types: Cigarettes     Start date: 1976     Quit date: 2012     Years since quitting: 10.0    Smokeless tobacco: Never   Substance Use Topics    Alcohol use: No    Drug use: No     Review of patient's allergies indicates:   Allergen  Reactions    Augmentin [amoxicillin-pot clavulanate] Swelling     Not allergic to amoxicillin just a derivative of augmentin    Lisinopril Other (See Comments)     Fluid around heart    Shellfish containing products Anaphylaxis     Pt.states she is allergic to SEAFOOD since the age of 13    Levaquin [levofloxacin] Other (See Comments)     Very bad joint pain    Clindamycin Palpitations     Chest pain       No current facility-administered medications on file prior to encounter.     Current Outpatient Medications on File Prior to Encounter   Medication Sig Dispense Refill    acetaminophen (TYLENOL) 500 MG tablet Take 2 tablets (1,000 mg total) by mouth every 8 (eight) hours as needed for Pain.  0    albuterol (PROVENTIL/VENTOLIN HFA) 90 mcg/actuation inhaler Inhale 2 puffs into the lungs every 6 (six) hours as needed for Wheezing. Rescue 18 g 1    ALPRAZolam (XANAX) 0.5 MG tablet Take 1 tablet (0.5 mg total) by mouth nightly as needed for Anxiety. 30 tablet 1    aspirin (ECOTRIN) 81 MG EC tablet Take 1 tablet (81 mg total) by mouth once daily. 90 tablet 3    clopidogreL (PLAVIX) 75 mg tablet Take 1 tablet (75 mg total) by mouth once daily. 90 tablet 3    collagenase (SANTYL) ointment Apply topically once daily. 90 g 1    docusate sodium (COLACE) 100 MG capsule Take 1 capsule (100 mg total) by mouth 2 (two) times daily. 60 capsule 1    empagliflozin (JARDIANCE) 10 mg tablet Take 1 tablet (10 mg total) by mouth once daily. 30 tablet 11    ezetimibe (ZETIA) 10 mg tablet Take 1 tablet (10 mg total) by mouth every evening. 90 tablet 3    famotidine (PEPCID) 20 MG tablet Take 1 tablet (20 mg total) by mouth once daily. 30 tablet 0    fluticasone propionate (FLONASE) 50 mcg/actuation nasal spray 1 spray (50 mcg total) by Each Nostril route once daily. 9.9 mL 1    food supplemt, lactose-reduced (ENSURE HIGH PROTEIN) Liqd Take 240 mLs by mouth 2 (two) times a day. 60 each 3    furosemide (LASIX) 40 MG tablet Take 1 tablet (40  mg total) by mouth 2 (two) times daily. 220 tablet 3    losartan (COZAAR) 50 MG tablet Take 1 tablet (50 mg total) by mouth once daily. 30 tablet 11    metoprolol succinate (TOPROL-XL) 25 MG 24 hr tablet Take 1 tablet (25 mg total) by mouth once daily. 90 tablet 3    nitroGLYCERIN (NITROSTAT) 0.4 MG SL tablet Place 1 tablet (0.4 mg total) under the tongue every 5 (five) minutes as needed for Chest pain. 25 tablet 3    ondansetron (ZOFRAN-ODT) 8 MG TbDL Take 1 tablet (8 mg total) by mouth every 12 (twelve) hours as needed (nausea). 20 tablet 2    polyethylene glycol (GLYCOLAX) 17 gram/dose powder Take 17 g by mouth once daily. 510 g 1    promethazine (PHENERGAN) 25 MG tablet Take 1 tablet (25 mg total) by mouth every 6 (six) hours as needed for Nausea. 15 tablet 0    simethicone (MYLICON) 80 MG chewable tablet Take 1 tablet (80 mg total) by mouth every 8 (eight) hours as needed for Flatulence. 120 tablet 5    spironolactone (ALDACTONE) 25 MG tablet Take 1 tablet (25 mg total) by mouth once daily. 90 tablet 3    traMADoL (ULTRAM) 50 mg tablet Take 1 tablet (50 mg total) by mouth every 12 (twelve) hours as needed for Pain. 30 tablet 0    [DISCONTINUED] pantoprazole (PROTONIX) 40 MG tablet Take 1 tablet (40 mg total) by mouth once daily. 30 tablet 1    [DISCONTINUED] prasugreL (EFFIENT) 10 mg Tab Take 1 tablet (10 mg total) by mouth once daily. 90 tablet 0     Physical Examination  BP (!) 94/51 (Patient Position: Lying)   Pulse 92   Temp 98.2 °F (36.8 °C) (Oral)   Resp 18   LMP  (LMP Unknown)   SpO2 97%      A chaperone was present for the physical examination.    Constitutional: well developed, no cough, minimal SOB  Head: Normocephalic, no lesions, without obvious abnormality  Eye: Normal external eye, conjunctiva, and lids  Cardiovascular: regular rate and regular rhythm  Respiratory: normal air entry  Gastrointestinal: soft, non-tender, non-distended - wound manager placed by Ms. Koroma and then leaked, changed  by me and Dr. Ramirez - discussed with inpatient wound care as well for ideas      Prior      Musculoskeletal: full range of motion without pain  Neurologic: alert, oriented, normal speech, no focal findings or movement disorder noted  Psychiatric: appropriate, normal mood    Assessment and Plan of Care    Thank you again for referring Ms. Hart to my care. In summary, Ms. Hart is a 67 year old woman presenting for management of colocutaneous fistula.     Patient Active Problem List   Diagnosis    Ischemic cardiomyopathy    Automatic implantable cardioverter-defibrillator in situ    History of coronary artery stent placement    Chronic congestive heart failure    Elevated rheumatoid factor    RBBB with left anterior fascicular block    History of diverticulitis    Coronary artery disease    Familial hypercholesterolemia    Normocytic anemia    History of myocardial infarction    Hypertension    Former smoker    Leukopenia    At risk for surgical complication    Anemia    Dizziness    Mood disorder    Alteration in skin integrity in adult    Prolonged Q-T interval on ECG    Hyponatremia    Debility    Recurrent major depressive disorder    Hypotension    VRE (vancomycin-resistant Enterococci) infection    Hypomagnesemia    CAD (coronary artery disease)    Increased nutritional needs    COPD (chronic obstructive pulmonary disease)    Hypoalbuminemia    Severe protein-calorie malnutrition    Abdominal wall cellulitis    Advance care planning    Peripheral edema    History of partial colectomy    Colostomy in place    Asymptomatic bacteriuria    Hypertriglyceridemia    Statin intolerance    Encounter for palliative care    Acute combined systolic and diastolic congestive heart failure    Shortness of breath    Heart failure, systolic and diastolic, chronic    Elevated troponin    Colocutaneous fistula     Plan to admit - wound manager placed today, plan to evaluate output and obtain better control prior to  discharge  Medications reconciled with patient in management of her co-morbidities described above - currently ordered medications are what she is taking at home and has been stable on - will monitor prealbumin and hyponatremia  Discussed importance of nutritional optimization - suspect that she will need surgery to correct her fistula and we want to rehabilitate her best as possible prior  Diabetic diet, insulin for goal euglycemia, Boost at least 3 times daily  Encourage ambulation multiple times per day - OOB as much as possible  Echocardiogram  Colonoscopy in 1 year (7/2023)     Please do not hesitate to contact me if you have any questions.      Rafa Cardozo MD - Staff Surgeon  Department of Colon & Rectal Surgery  [facility]

## 2022-12-22 NOTE — PLAN OF CARE
POC reviewed with pt. Pt verbalized understanding. AAOX'4. VSS on RA. Pt tolerating small amounts of clear liquid diet. Pt did not want to eat her low fiber/low residue diet. C/o nausea treated with scheduled and PRN meds as per MD order. Pain managed with PRN meds as per MD order. Midline incision with island dressing, small amount of drainage marked. RLQ PRASAD drain intact. LLQ colostomy intact with  no output.  Bed in lowest position with call light within reach. TM   No

## 2022-12-22 NOTE — NURSING
Pt admitted to unit via wheelchair. Pt assessed. Safety interventions maintained. Call bell within reach. MD notified of pt transfer to [facility].

## 2022-12-23 NOTE — PROGRESS NOTES
Patient name: Odette Hart   YOB: 1955   MRN: [id number]  Date of visit: 12/23/2022    HPI: Ms. 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 Epstein) on 5/9/2022, complicated by readmission for management of shortness of breath/HF exacerbation, severe malnutrition, and wound care. She has been discharged and is here for follow-up.    Despite white sponge on low pressure, unfortunately, 7 months after her index operation she developed a colocutaneous fistula - confirmed on CT. She did have mild prolapse causing issues with pouching and so we directly admitted her to work on control and nutritional optimization. She is not having and fevers, nausea, or vomiting. She is drinking Boosts at home and is much more active.     Albumin 2.2, Prealbumin 10-11    Interval Events: Pouch holding overnight, has not gotten out of bed. Stoma productive, fistula with small hard stool output    Review of Systems  See pertinent review of systems above    No current facility-administered medications on file prior to encounter.     Current Outpatient Medications on File Prior to Encounter   Medication Sig Dispense Refill    acetaminophen (TYLENOL) 500 MG tablet Take 2 tablets (1,000 mg total) by mouth every 8 (eight) hours as needed for Pain.  0    albuterol (PROVENTIL/VENTOLIN HFA) 90 mcg/actuation inhaler Inhale 2 puffs into the lungs every 6 (six) hours as needed for Wheezing. Rescue 18 g 1    ALPRAZolam (XANAX) 0.5 MG tablet Take 1 tablet (0.5 mg total) by mouth nightly as needed for Anxiety. 30 tablet 1    aspirin (ECOTRIN) 81 MG EC  tablet Take 1 tablet (81 mg total) by mouth once daily. 90 tablet 3    clopidogreL (PLAVIX) 75 mg tablet Take 1 tablet (75 mg total) by mouth once daily. 90 tablet 3    collagenase (SANTYL) ointment Apply topically once daily. 90 g 1    docusate sodium (COLACE) 100 MG capsule Take 1 capsule (100 mg total) by mouth 2 (two) times daily. 60 capsule 1    empagliflozin (JARDIANCE) 10 mg tablet Take 1 tablet (10 mg total) by mouth once daily. 30 tablet 11    ezetimibe (ZETIA) 10 mg tablet Take 1 tablet (10 mg total) by mouth every evening. 90 tablet 3    famotidine (PEPCID) 20 MG tablet Take 1 tablet (20 mg total) by mouth once daily. 30 tablet 0    fluticasone propionate (FLONASE) 50 mcg/actuation nasal spray 1 spray (50 mcg total) by Each Nostril route once daily. 9.9 mL 1    food supplemt, lactose-reduced (ENSURE HIGH PROTEIN) Liqd Take 240 mLs by mouth 2 (two) times a day. 60 each 3    furosemide (LASIX) 40 MG tablet Take 1 tablet (40 mg total) by mouth 2 (two) times daily. 220 tablet 3    losartan (COZAAR) 50 MG tablet Take 1 tablet (50 mg total) by mouth once daily. 30 tablet 11    metoprolol succinate (TOPROL-XL) 25 MG 24 hr tablet Take 1 tablet (25 mg total) by mouth once daily. 90 tablet 3    nitroGLYCERIN (NITROSTAT) 0.4 MG SL tablet Place 1 tablet (0.4 mg total) under the tongue every 5 (five) minutes as needed for Chest pain. 25 tablet 3    ondansetron (ZOFRAN-ODT) 8 MG TbDL Take 1 tablet (8 mg total) by mouth every 12 (twelve) hours as needed (nausea). 20 tablet 2    polyethylene glycol (GLYCOLAX) 17 gram/dose powder Take 17 g by mouth once daily. 510 g 1    promethazine (PHENERGAN) 25 MG tablet Take 1 tablet (25 mg total) by mouth every 6 (six) hours as needed for Nausea. 15 tablet 0    simethicone (MYLICON) 80 MG chewable tablet Take 1 tablet (80 mg total) by mouth every 8 (eight) hours as needed for Flatulence. 120 tablet 5    spironolactone (ALDACTONE) 25 MG tablet Take 1 tablet (25 mg total) by mouth  "once daily. 90 tablet 3    traMADoL (ULTRAM) 50 mg tablet Take 1 tablet (50 mg total) by mouth every 12 (twelve) hours as needed for Pain. 30 tablet 0    [DISCONTINUED] pantoprazole (PROTONIX) 40 MG tablet Take 1 tablet (40 mg total) by mouth once daily. 30 tablet 1    [DISCONTINUED] prasugreL (EFFIENT) 10 mg Tab Take 1 tablet (10 mg total) by mouth once daily. 90 tablet 0         Physical Examination  BP (!) 104/54 (Patient Position: Lying)   Pulse 77   Temp 97.9 °F (36.6 °C) (Oral)   Resp 18   Ht 5' 3" (1.6 m)   LMP  (LMP Unknown)   SpO2 (!) 94%   BMI 21.05 kg/m²      A chaperone was present for the physical examination.    Constitutional: well developed, no cough, minimal SOB  Head: Normocephalic, no lesions, without obvious abnormality  Eye: Normal external eye, conjunctiva, and lids  Cardiovascular: regular rate and regular rhythm  Respiratory: normal air entry  Gastrointestinal: soft, non-tender, non-distended, appliances holding  Musculoskeletal: full range of motion without pain  Neurologic: alert, oriented, normal speech, no focal findings or movement disorder noted  Psychiatric: appropriate, normal mood    Assessment and Plan of Care    Ms. Hart is a 67 year old woman presenting for management of colocutaneous fistula.     Patient Active Problem List   Diagnosis    Ischemic cardiomyopathy    Automatic implantable cardioverter-defibrillator in situ    History of coronary artery stent placement    Chronic congestive heart failure    Elevated rheumatoid factor    RBBB with left anterior fascicular block    History of diverticulitis    Coronary artery disease    Familial hypercholesterolemia    Normocytic anemia    History of myocardial infarction    Hypertension    Former smoker    Leukopenia    At risk for surgical complication    Anemia    Dizziness    Mood disorder    Alteration in skin integrity in adult    Prolonged Q-T interval on ECG    Hyponatremia    Debility    Recurrent major depressive " disorder    Hypotension    VRE (vancomycin-resistant Enterococci) infection    Hypomagnesemia    CAD (coronary artery disease)    Increased nutritional needs    COPD (chronic obstructive pulmonary disease)    Hypoalbuminemia    Severe protein-calorie malnutrition    Abdominal wall cellulitis    Advance care planning    Peripheral edema    History of partial colectomy    Colostomy in place    Asymptomatic bacteriuria    Hypertriglyceridemia    Statin intolerance    Encounter for palliative care    Acute combined systolic and diastolic congestive heart failure    Shortness of breath    Heart failure, systolic and diastolic, chronic    Elevated troponin    Colocutaneous fistula     Wound manager placed yesterday, plan to evaluate output and obtain better control prior to discharge  Medications reconciled with patient in management of her co-morbidities described above - currently ordered medications are what she is taking at home and has been stable on - will monitor prealbumin and hyponatremia  Discussed importance of nutritional optimization - suspect that she will need surgery to correct her fistula and we want to rehabilitate her best as possible prior  Diabetic diet, insulin for goal euglycemia, Boost at least 3 times daily  Encourage ambulation multiple times per day - OOB as much as possible  Echocardiogram  Colonoscopy in 1 year (7/2023)       LITA DUBON MD - Staff Surgeon  Department of Colon & Rectal Surgery  [facility]

## 2022-12-23 NOTE — PLAN OF CARE
[facility] - Telemetry Stepdown  Initial Discharge Assessment       Primary Care Provider: Braxton Barragan MD    Admission Diagnosis: Fistula of intestine [K63.2]  Enterocutaneous fistula [K63.2]    Admission Date: 12/22/2022  Expected Discharge Date: 12/26/2022    Discharge Barriers Identified: None    Payor: [employer] / Plan: [employer] 65 / Product Type: Medicare Advantage /     Extended Emergency Contact Information  Primary Emergency Contact: Telly Ortiz Jr   [place]  Mobile Phone: [phone]  Relation: Son  Secondary Emergency Contact: Abelardo Ortiz  Mobile Phone: [phone]  Relation: Son    Discharge Plan A: Home  Discharge Plan B: Home Health      [facility] - [place], [place] - [address]  [address]  [place] [address]  Phone: [phone] Fax: [phone]    [facility]  [address]  [place] [address]  Phone: [phone] Fax: [phone]      Initial Assessment (most recent)       Adult Discharge Assessment - 12/23/22 1047          Discharge Assessment    Assessment Type Discharge Planning Assessment     Confirmed/corrected address, phone number and insurance Yes     Confirmed Demographics Correct on Facesheet     Source of Information patient     Communicated SIVAN with patient/caregiver Yes     Reason For Admission Enterocutaneous fistula     People in Home alone     Do you expect to return to your current living situation? Yes     Do you have help at home or someone to help you manage your care at home? Yes     Prior to hospitilization cognitive status: Alert/Oriented     Current cognitive status: Alert/Oriented     Equipment Currently Used at Home walker, rolling;bedside commode     Readmission within 30 days? No     Patient currently being followed by outpatient case management? No     Do you currently have service(s) that help you manage your care at home? No     Do you  take prescription medications? Yes     Do you have prescription coverage? Yes     Do you have any problems affording any of your prescribed medications? No     Is the patient taking medications as prescribed? yes     Who is going to help you get home at discharge? Patient's family will provide transportation home.     How do you get to doctors appointments? car, drives self;family or friend will provide     Are you on dialysis? No     Do you take coumadin? No     Discharge Plan A Home     Discharge Plan B Home Health     DME Needed Upon Discharge  none     Discharge Plan discussed with: Patient     Discharge Barriers Identified None                   Alejandra Crandall RN  Ext [phone]

## 2022-12-23 NOTE — CONSULTS
Wound care consult received for EC fistula. The wound manage on the abdomen is intact without leakage. Supplies at the bedside. Will follow-up.

## 2022-12-24 NOTE — PLAN OF CARE
Problem: Adult Inpatient Plan of Care  Goal: Absence of Hospital-Acquired Illness or Injury  Outcome: Ongoing, Progressing  Intervention: Identify and Manage Fall Risk  Flowsheets (Taken 12/24/2022 0080)  Safety Promotion/Fall Prevention:   assistive device/personal item within reach   side rails raised x 2   lighting adjusted   medications reviewed  Plan of care was reviewed with the pt. Paco. VSS. Pain management was reviewed with the pt. Wound care was done by the pt for her colostomy site. Intake and output was documented. No major changes throughout the day. Safety precautions were in placed.

## 2022-12-24 NOTE — PLAN OF CARE
Problem: Adult Inpatient Plan of Care  Goal: Plan of Care Review  Outcome: Ongoing, Progressing  Goal: Patient-Specific Goal (Individualized)  Outcome: Ongoing, Progressing  Goal: Absence of Hospital-Acquired Illness or Injury  Outcome: Ongoing, Progressing  Goal: Optimal Comfort and Wellbeing  Outcome: Ongoing, Progressing  Goal: Readiness for Transition of Care  Outcome: Ongoing, Progressing     Problem: Fall Injury Risk  Goal: Absence of Fall and Fall-Related Injury  Outcome: Ongoing, Progressing   Aox4. Walk test completed as asked by doctor. Safety interventions maintained. Call bell within reach. Care ongoing.

## 2022-12-24 NOTE — PROGRESS NOTES
Patient name: Odette Hart   YOB: 1955   MRN: [id number]  Date of visit: 12/24/2022    HPI: Ms. 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 Epstein) on 5/9/2022, complicated by readmission for management of shortness of breath/HF exacerbation, severe malnutrition, and wound care. She has been discharged and is here for follow-up.    Despite white sponge on low pressure, unfortunately, 7 months after her index operation she developed a colocutaneous fistula - confirmed on CT. She did have mild prolapse causing issues with pouching and so we directly admitted her to work on control and nutritional optimization. She is not having and fevers, nausea, or vomiting. She is drinking Boosts at home and is much more active.     Albumin 2.2, Prealbumin 10-11    Interval Events: Pouch with some leakage at  inferior portion. Stoma productive, fistula with small hard stool output    Review of Systems  See pertinent review of systems above    No current facility-administered medications on file prior to encounter.     Current Outpatient Medications on File Prior to Encounter   Medication Sig Dispense Refill    acetaminophen (TYLENOL) 500 MG tablet Take 2 tablets (1,000 mg total) by mouth every 8 (eight) hours as needed for Pain.  0    albuterol (PROVENTIL/VENTOLIN HFA) 90 mcg/actuation inhaler Inhale 2 puffs into the lungs every 6 (six) hours as needed for Wheezing. Rescue 18 g 1    ALPRAZolam (XANAX) 0.5 MG tablet Take 1 tablet (0.5 mg total) by mouth nightly as needed for Anxiety. 30 tablet 1    aspirin (ECOTRIN) 81 MG EC tablet  Take 1 tablet (81 mg total) by mouth once daily. 90 tablet 3    clopidogreL (PLAVIX) 75 mg tablet Take 1 tablet (75 mg total) by mouth once daily. 90 tablet 3    collagenase (SANTYL) ointment Apply topically once daily. 90 g 1    docusate sodium (COLACE) 100 MG capsule Take 1 capsule (100 mg total) by mouth 2 (two) times daily. 60 capsule 1    empagliflozin (JARDIANCE) 10 mg tablet Take 1 tablet (10 mg total) by mouth once daily. 30 tablet 11    ezetimibe (ZETIA) 10 mg tablet Take 1 tablet (10 mg total) by mouth every evening. 90 tablet 3    famotidine (PEPCID) 20 MG tablet Take 1 tablet (20 mg total) by mouth once daily. 30 tablet 0    fluticasone propionate (FLONASE) 50 mcg/actuation nasal spray 1 spray (50 mcg total) by Each Nostril route once daily. 9.9 mL 1    food supplemt, lactose-reduced (ENSURE HIGH PROTEIN) Liqd Take 240 mLs by mouth 2 (two) times a day. 60 each 3    furosemide (LASIX) 40 MG tablet Take 1 tablet (40 mg total) by mouth 2 (two) times daily. 220 tablet 3    losartan (COZAAR) 50 MG tablet Take 1 tablet (50 mg total) by mouth once daily. 30 tablet 11    metoprolol succinate (TOPROL-XL) 25 MG 24 hr tablet Take 1 tablet (25 mg total) by mouth once daily. 90 tablet 3    nitroGLYCERIN (NITROSTAT) 0.4 MG SL tablet Place 1 tablet (0.4 mg total) under the tongue every 5 (five) minutes as needed for Chest pain. 25 tablet 3    ondansetron (ZOFRAN-ODT) 8 MG TbDL Take 1 tablet (8 mg total) by mouth every 12 (twelve) hours as needed (nausea). 20 tablet 2    polyethylene glycol (GLYCOLAX) 17 gram/dose powder Take 17 g by mouth once daily. 510 g 1    promethazine (PHENERGAN) 25 MG tablet Take 1 tablet (25 mg total) by mouth every 6 (six) hours as needed for Nausea. 15 tablet 0    simethicone (MYLICON) 80 MG chewable tablet Take 1 tablet (80 mg total) by mouth every 8 (eight) hours as needed for Flatulence. 120 tablet 5    spironolactone (ALDACTONE) 25 MG tablet Take 1 tablet (25 mg total) by mouth once  "daily. 90 tablet 3    traMADoL (ULTRAM) 50 mg tablet Take 1 tablet (50 mg total) by mouth every 12 (twelve) hours as needed for Pain. 30 tablet 0    [DISCONTINUED] pantoprazole (PROTONIX) 40 MG tablet Take 1 tablet (40 mg total) by mouth once daily. 30 tablet 1    [DISCONTINUED] prasugreL (EFFIENT) 10 mg Tab Take 1 tablet (10 mg total) by mouth once daily. 90 tablet 0         Physical Examination  BP (!) 128/94   Pulse 76   Temp 97.5 °F (36.4 °C)   Resp 18   Ht 5' 3" (1.6 m)   LMP  (LMP Unknown)   SpO2 97%   BMI 21.05 kg/m²      A chaperone was present for the physical examination.    Constitutional: well developed, no cough, minimal SOB  Head: Normocephalic, no lesions, without obvious abnormality  Eye: Normal external eye, conjunctiva, and lids  Cardiovascular: regular rate and regular rhythm  Respiratory: normal air entry  Gastrointestinal: soft, non-tender, non-distended, appliances holding  Musculoskeletal: full range of motion without pain  Neurologic: alert, oriented, normal speech, no focal findings or movement disorder noted  Psychiatric: appropriate, normal mood    Assessment and Plan of Care    Ms. Hart is a 67 year old woman presenting for management of colocutaneous fistula.     Patient Active Problem List   Diagnosis    Ischemic cardiomyopathy    Automatic implantable cardioverter-defibrillator in situ    History of coronary artery stent placement    Chronic congestive heart failure    Elevated rheumatoid factor    RBBB with left anterior fascicular block    History of diverticulitis    Coronary artery disease    Familial hypercholesterolemia    Normocytic anemia    History of myocardial infarction    Hypertension    Former smoker    Leukopenia    At risk for surgical complication    Anemia    Dizziness    Mood disorder    Alteration in skin integrity in adult    Prolonged Q-T interval on ECG    Hyponatremia    Debility    Recurrent major depressive disorder    Hypotension    VRE " (vancomycin-resistant Enterococci) infection    Hypomagnesemia    CAD (coronary artery disease)    Increased nutritional needs    COPD (chronic obstructive pulmonary disease)    Hypoalbuminemia    Severe protein-calorie malnutrition    Abdominal wall cellulitis    Advance care planning    Peripheral edema    History of partial colectomy    Colostomy in place    Asymptomatic bacteriuria    Hypertriglyceridemia    Statin intolerance    Encounter for palliative care    Acute combined systolic and diastolic congestive heart failure    Shortness of breath    Heart failure, systolic and diastolic, chronic    Elevated troponin    Colocutaneous fistula     Wound manager placed yesterday, plan to evaluate output and obtain better control prior to discharge  Add fiber   Bag changed this morning  Medications reconciled with patient in management of her co-morbidities described above - currently ordered medications are what she is taking at home and has been stable on - will monitor prealbumin and hyponatremia  Discussed importance of nutritional optimization - suspect that she will need surgery to correct her fistula and we want to rehabilitate her best as possible prior  Diabetic diet, insulin for goal euglycemia, Boost at least 3 times daily  Encourage ambulation multiple times per day - OOB as much as possible  Echocardiogram  Colonoscopy in 1 year (7/2023)       Cecilia Alberto MD - Staff Surgeon  Department of Colon & Rectal Surgery  [facility]

## 2022-12-25 NOTE — PROGRESS NOTES
Patient name: Odette Hart   YOB: 1955   MRN: [id number]  Date of visit: 12/25/2022    HPI: Ms. 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 Epstein) on 5/9/2022, complicated by readmission for management of shortness of breath/HF exacerbation, severe malnutrition, and wound care. She has been discharged and is here for follow-up.    Despite white sponge on low pressure, unfortunately, 7 months after her index operation she developed a colocutaneous fistula - confirmed on CT. She did have mild prolapse causing issues with pouching and so we directly admitted her to work on control and nutritional optimization. She is not having and fevers, nausea, or vomiting. She is drinking Boosts at home and is much more active.     Albumin 2.2, Prealbumin 10-11    Interval Events: Pouch with some leakage at superior portion reinforced with Tegaderm with no further issues. No output from colostomy.     Review of Systems  See pertinent review of systems above    No current facility-administered medications on file prior to encounter.     Current Outpatient Medications on File Prior to Encounter   Medication Sig Dispense Refill    acetaminophen (TYLENOL) 500 MG tablet Take 2 tablets (1,000 mg total) by mouth every 8 (eight) hours as needed for Pain.  0    albuterol (PROVENTIL/VENTOLIN HFA) 90 mcg/actuation inhaler Inhale 2 puffs into the lungs every 6 (six) hours as needed for Wheezing. Rescue 18 g 1    ALPRAZolam (XANAX) 0.5 MG tablet Take 1 tablet (0.5 mg total) by mouth nightly as needed for Anxiety. 30 tablet 1    aspirin  (ECOTRIN) 81 MG EC tablet Take 1 tablet (81 mg total) by mouth once daily. 90 tablet 3    clopidogreL (PLAVIX) 75 mg tablet Take 1 tablet (75 mg total) by mouth once daily. 90 tablet 3    collagenase (SANTYL) ointment Apply topically once daily. 90 g 1    docusate sodium (COLACE) 100 MG capsule Take 1 capsule (100 mg total) by mouth 2 (two) times daily. 60 capsule 1    empagliflozin (JARDIANCE) 10 mg tablet Take 1 tablet (10 mg total) by mouth once daily. 30 tablet 11    ezetimibe (ZETIA) 10 mg tablet Take 1 tablet (10 mg total) by mouth every evening. 90 tablet 3    famotidine (PEPCID) 20 MG tablet Take 1 tablet (20 mg total) by mouth once daily. 30 tablet 0    fluticasone propionate (FLONASE) 50 mcg/actuation nasal spray 1 spray (50 mcg total) by Each Nostril route once daily. 9.9 mL 1    food supplemt, lactose-reduced (ENSURE HIGH PROTEIN) Liqd Take 240 mLs by mouth 2 (two) times a day. 60 each 3    furosemide (LASIX) 40 MG tablet Take 1 tablet (40 mg total) by mouth 2 (two) times daily. 220 tablet 3    losartan (COZAAR) 50 MG tablet Take 1 tablet (50 mg total) by mouth once daily. 30 tablet 11    metoprolol succinate (TOPROL-XL) 25 MG 24 hr tablet Take 1 tablet (25 mg total) by mouth once daily. 90 tablet 3    nitroGLYCERIN (NITROSTAT) 0.4 MG SL tablet Place 1 tablet (0.4 mg total) under the tongue every 5 (five) minutes as needed for Chest pain. 25 tablet 3    ondansetron (ZOFRAN-ODT) 8 MG TbDL Take 1 tablet (8 mg total) by mouth every 12 (twelve) hours as needed (nausea). 20 tablet 2    polyethylene glycol (GLYCOLAX) 17 gram/dose powder Take 17 g by mouth once daily. 510 g 1    promethazine (PHENERGAN) 25 MG tablet Take 1 tablet (25 mg total) by mouth every 6 (six) hours as needed for Nausea. 15 tablet 0    simethicone (MYLICON) 80 MG chewable tablet Take 1 tablet (80 mg total) by mouth every 8 (eight) hours as needed for Flatulence. 120 tablet 5    spironolactone (ALDACTONE) 25 MG tablet Take 1 tablet (25 mg  "total) by mouth once daily. 90 tablet 3    traMADoL (ULTRAM) 50 mg tablet Take 1 tablet (50 mg total) by mouth every 12 (twelve) hours as needed for Pain. 30 tablet 0    [DISCONTINUED] pantoprazole (PROTONIX) 40 MG tablet Take 1 tablet (40 mg total) by mouth once daily. 30 tablet 1    [DISCONTINUED] prasugreL (EFFIENT) 10 mg Tab Take 1 tablet (10 mg total) by mouth once daily. 90 tablet 0         Physical Examination  BP (!) 93/55 (BP Location: Left arm, Patient Position: Lying)   Pulse 70   Temp 98 °F (36.7 °C) (Oral)   Resp 18   Ht 5' 3" (1.6 m)   LMP  (LMP Unknown)   SpO2 99%   BMI 21.05 kg/m²      A chaperone was present for the physical examination.    Constitutional: well developed, no cough, minimal SOB  Head: Normocephalic, no lesions, without obvious abnormality  Eye: Normal external eye, conjunctiva, and lids  Cardiovascular: regular rate and regular rhythm  Respiratory: normal air entry  Gastrointestinal: soft, non-tender, non-distended, appliances holding  Musculoskeletal: full range of motion without pain  Neurologic: alert, oriented, normal speech, no focal findings or movement disorder noted  Psychiatric: appropriate, normal mood    Assessment and Plan of Care    Ms. Hart is a 67 year old woman presenting for management of colocutaneous fistula.     Patient Active Problem List   Diagnosis    Ischemic cardiomyopathy    Automatic implantable cardioverter-defibrillator in situ    History of coronary artery stent placement    Chronic congestive heart failure    Elevated rheumatoid factor    RBBB with left anterior fascicular block    History of diverticulitis    Coronary artery disease    Familial hypercholesterolemia    Normocytic anemia    History of myocardial infarction    Hypertension    Former smoker    Leukopenia    At risk for surgical complication    Anemia    Dizziness    Mood disorder    Alteration in skin integrity in adult    Prolonged Q-T interval on ECG    Hyponatremia    Debility "    Recurrent major depressive disorder    Hypotension    VRE (vancomycin-resistant Enterococci) infection    Hypomagnesemia    CAD (coronary artery disease)    Increased nutritional needs    COPD (chronic obstructive pulmonary disease)    Hypoalbuminemia    Severe protein-calorie malnutrition    Abdominal wall cellulitis    Advance care planning    Peripheral edema    History of partial colectomy    Colostomy in place    Asymptomatic bacteriuria    Hypertriglyceridemia    Statin intolerance    Encounter for palliative care    Acute combined systolic and diastolic congestive heart failure    Shortness of breath    Heart failure, systolic and diastolic, chronic    Elevated troponin    Colocutaneous fistula     Wound manager placed yesterday, plan to evaluate output and obtain better control prior to discharge  Add fiber   Medications reconciled with patient in management of her co-morbidities described above - currently ordered medications are what she is taking at home and has been stable on - will monitor prealbumin and hyponatremia  Discussed importance of nutritional optimization - suspect that she will need surgery to correct her fistula and we want to rehabilitate her best as possible prior  Diabetic diet, insulin for goal euglycemia, Boost at least 3 times daily  Encourage ambulation multiple times per day - OOB as much as possible  Echocardiogram  Colonoscopy in 1 year (7/2023)       Cecilia Alberto MD - Staff Surgeon  Department of Colon & Rectal Surgery  [facility]

## 2022-12-25 NOTE — PLAN OF CARE
Problem: Adult Inpatient Plan of Care  Goal: Absence of Hospital-Acquired Illness or Injury  Outcome: Ongoing, Progressing  Intervention: Prevent Skin Injury  Flowsheets (Taken 12/25/2022 4432)  Body Position: position changed independently  Skin Protection:   adhesive use limited   incontinence pads utilized  Plan of care was reviewed with the pt. AAOx4. VSS. Pain management was reviewed with the pt. Intake and output was documented. No major changes throughout the day. Safety precautions were in placed.

## 2022-12-26 NOTE — PROGRESS NOTES
Patient Name: Odette Hart  Date: 12/26/2022  Service: Colon and Rectal Surgery    Subjective:  No acute events overnight. Small leakage from superior aspect of wound manager reinforced with tegederm without issue, no flatus/stool from end colostomy x48hr. Denies abdominal pain. Tolerating regular diet without nausea/vomiting. Ambulating, voiding freely. No other concerns.    Medications:    Current Facility-Administered Medications:     albuterol inhaler 2 puff, 2 puff, Inhalation, Q6H PRN, Rafa Cardozo MD    ALPRAZolam tablet 0.5 mg, 0.5 mg, Oral, Nightly PRN, Rafa Cardozo MD, 0.5 mg at 12/25/22 2049    aspirin EC tablet 81 mg, 81 mg, Oral, Daily, Rafa Cardozo MD, 81 mg at 12/26/22 0926    clopidogreL tablet 75 mg, 75 mg, Oral, Daily, Rafa Cardozo MD, 75 mg at 12/26/22 0926    dextrose 10% bolus 125 mL 125 mL, 12.5 g, Intravenous, PRN, Rafa Cardozo MD    dextrose 10% bolus 250 mL 250 mL, 25 g, Intravenous, PRN, Rafa Cardozo MD    ezetimibe tablet 10 mg, 10 mg, Oral, QHS, Rafa Cardozo MD, 10 mg at 12/25/22 2049    famotidine tablet 20 mg, 20 mg, Oral, Daily, Rafa Cardozo MD, 20 mg at 12/26/22 0926    fluticasone propionate 50 mcg/actuation nasal spray 50 mcg, 1 spray, Each Nostril, Daily, Rafa Cardozo MD, 50 mcg at 12/26/22 0926    furosemide tablet 40 mg, 40 mg, Oral, Daily, Rafa Cardozo MD, 40 mg at 12/26/22 0926    glucagon (human recombinant) injection 1 mg, 1 mg, Intramuscular, PRN, Rafa Cardozo MD    glucose chewable tablet 16 g, 16 g, Oral, PRN, Rafa Cardozo MD    glucose chewable tablet 24 g, 24 g, Oral, PRN, Rafa Cardozo MD    HYDROmorphone injection 0.5 mg, 0.5 mg, Intravenous, Q6H PRN, Carola Buckley NP    insulin aspart U-100 pen 1-10 Units, 1-10 Units, Subcutaneous, QID (AC + HS) PRN, Rafa Cardozo MD    losartan tablet 25 mg, 25 mg, Oral, Daily, Rafa Cardozo MD, 25 mg at 12/26/22 0943     metoprolol succinate (TOPROL-XL) 24 hr tablet 25 mg, 25 mg, Oral, Daily, Rafa Cardozo MD, 25 mg at 12/26/22 0926    naloxone 0.4 mg/mL injection 0.02 mg, 0.02 mg, Intravenous, PRN, Carola Buckley NP    prochlorperazine injection Soln 5 mg, 5 mg, Intravenous, Q6H PRN, Carola Buckley NP, 5 mg at 12/25/22 2320    psyllium husk (aspartame) 3.4 gram packet 1 packet, 1 packet, Oral, BID, Pablo Ramirez MD, 1 packet at 12/25/22 2049    sodium chloride 0.9% flush 10 mL, 10 mL, Intravenous, PRN, Carola Buckley NP    traMADoL tablet 50 mg, 50 mg, Oral, Q4H PRN, Arabella Schuster MD, 50 mg at 12/25/22 2049    Vital Signs:  Vitals:    12/26/22 0809   BP: (!) 97/51   Pulse: 74   Resp: 18   Temp: 97.5 °F (36.4 °C)       In/Out:  Intake/Output - Last 3 Shifts         12/24 0700 12/25 0659 12/25 0700 12/26 0659 12/26 0700  12/27 0659    P.O. 720 720     Total Intake 720 720     Net +720 +720            Urine Occurrence 7 x 5 x     Stool Occurrence  3 x           Physical Exam:  General: Alert, oriented, in no apparent distress  HEENT: Sclera anicteric, trachea midline  Lungs: Normal respiratory rate and effort on room air  Abdomen: Soft, nontender, nondistended. Midline ECFistula with slight prolapse, wound manager in place reinforced superiorly without leakage, soft brown stool in bag. LUQ end colostomy pink and well perfused without gas or stool in bag.  Extremities: Warm, well perfused, no edema  Neuro: Grossly intact, moves all extremities  Psych: Appropriate affect    Laboratory Studies:  Complete Blood Count:  Lab Results   Component Value Date/Time    WBC 3.02 (L) 12/22/2022 01:33 PM    HGB 11.2 (L) 12/22/2022 01:33 PM    HCT 36.7 (L) 12/22/2022 01:33 PM    HCT 46 05/06/2022 09:55 PM    RBC 4.44 12/22/2022 01:33 PM     12/22/2022 01:33 PM       Basic Chemistry Panel:  Lab Results   Component Value Date/Time     (L) 12/22/2022 01:32 PM    K 4.1 12/22/2022 01:32 PM    CL 96 12/22/2022  01:32 PM    CO2 30 (H) 12/22/2022 01:32 PM    BUN 21 12/22/2022 01:32 PM    CREATININE 0.8 12/22/2022 01:32 PM    CALCIUM 8.8 12/22/2022 01:32 PM       Hepatic Panel:  Lab Results   Component Value Date/Time    AST 45 (H) 12/22/2022 01:32 PM    ALT 26 12/22/2022 01:32 PM    ALKPHOS 134 12/22/2022 01:32 PM    BILITOT 0.6 12/22/2022 01:32 PM    ALBUMIN 2.2 (L) 12/22/2022 01:32 PM       Coagulation Panel:  Lab Results   Component Value Date/Time    INR 1.2 07/21/2022 04:28 AM       Imaging Studies:  Echo (12/23/22):  The left ventricle is severely enlarged with eccentric hypertrophy and severely decreased systolic function. The estimated ejection fraction is 10%.  Normal right ventricular size with mildly reduced right ventricular systolic function.  Grade II left ventricular diastolic dysfunction.  Biatrial enlargement.  The estimated PA systolic pressure is 51 mmHg.  Normal central venous pressure (3 mmHg).    Assessment:  Odette Hart is a 67 y.o. year old female with history of CAD and PCI in 1/2022, HF (EF 10%), HTN, ICD, chronic hypoxemic respiratory failure, MDD, debility, severe malnutrition, recent acute cholecystitis who underwent an open takedown of colovesical and colovaginal fistuli (two separate areas) with sigmoid colectomy, partial cecectomy and appendectomy, drainage of intra-abdominal abscess and end colostomy (with cholecystectomy by Dr. Epstein) on 5/9/2022, complicated by readmission for management of shortness of breath/HF exacerbation, severe malnutrition, and wound care who is now admitted for transverse colocutaneous fistula.    Plan:  Wound manager to be changed by wound care team today - will plan to cover end colostomy with dry gauze without stoma appliance in order to provide better seal for wound manager given minimal output  Continue daily psyllium  Continue home medications. Medications reconciled with patient in management of her co-morbidities described above - currently ordered  medications are what she is taking at home and has been stable on - will monitor prealbumin and hyponatremia  Continue diabetic diet, insulin for goal euglycemia, Boost at least 3 times daily for nutritional optimization  Encourage ambulation multiple times per day - OOB as much as possible  Echocardiogram completed 12/23/22  Colonoscopy in 1 year (7/2023)     Patient discussed with attending, Dr. Cardozo.    Pablo Ramirez MD  Colon and Rectal Surgery Fellow

## 2022-12-27 NOTE — PROGRESS NOTES
Patient Name: Odette Hart  Date: 12/27/2022  Service: Colon and Rectal Surgery    Subjective:  No acute events overnight. Denies abdominal pain. Tolerating regular diet without nausea/vomiting. Ambulating, voiding freely. No other concerns.    Medications:    Current Facility-Administered Medications:     albuterol inhaler 2 puff, 2 puff, Inhalation, Q6H PRN, Rafa Cardozo MD    ALPRAZolam tablet 0.5 mg, 0.5 mg, Oral, Nightly PRN, Rafa Cardozo MD, 0.5 mg at 12/27/22 0054    aspirin EC tablet 81 mg, 81 mg, Oral, Daily, Rafa Cardozo MD, 81 mg at 12/27/22 0928    clopidogreL tablet 75 mg, 75 mg, Oral, Daily, Rafa Cardozo MD, 75 mg at 12/27/22 0927    dextrose 10% bolus 125 mL 125 mL, 12.5 g, Intravenous, PRN, Rafa Cardozo MD    dextrose 10% bolus 250 mL 250 mL, 25 g, Intravenous, PRN, Rafa Cardozo MD    ezetimibe tablet 10 mg, 10 mg, Oral, QHS, Rafa Cardozo MD, 10 mg at 12/26/22 2140    famotidine tablet 20 mg, 20 mg, Oral, Daily, aRfa Cardozo MD, 20 mg at 12/27/22 0928    fluticasone propionate 50 mcg/actuation nasal spray 50 mcg, 1 spray, Each Nostril, Daily, Rafa Cardozo MD, 50 mcg at 12/26/22 0926    furosemide tablet 40 mg, 40 mg, Oral, Daily, Rafa Cardozo MD, 40 mg at 12/27/22 0928    glucagon (human recombinant) injection 1 mg, 1 mg, Intramuscular, PRN, Rafa Cardozo MD    glucose chewable tablet 16 g, 16 g, Oral, PRN, Rafa Cardozo MD    glucose chewable tablet 24 g, 24 g, Oral, PRN, Rafa Cardozo MD    HYDROmorphone injection 0.5 mg, 0.5 mg, Intravenous, Q6H PRN, Carola Buckley NP    insulin aspart U-100 pen 1-10 Units, 1-10 Units, Subcutaneous, QID (AC + HS) PRN, Rafa Cardozo MD    losartan tablet 25 mg, 25 mg, Oral, Daily, Rafa Cardozo MD, 25 mg at 12/27/22 0928    metoprolol succinate (TOPROL-XL) 24 hr tablet 25 mg, 25 mg, Oral, Daily, Rafa Cardozo MD, 25 mg at 12/27/22 0928    naloxone  0.4 mg/mL injection 0.02 mg, 0.02 mg, Intravenous, PRN, Carola Buckley NP    prochlorperazine injection Soln 5 mg, 5 mg, Intravenous, Q6H PRN, Carola Buckley NP, 5 mg at 12/25/22 2320    psyllium husk (aspartame) 3.4 gram packet 1 packet, 1 packet, Oral, Daily, Pablo Ramirez MD    sodium chloride 0.9% flush 10 mL, 10 mL, Intravenous, PRN, Carola Buckley NP    traMADoL tablet 50 mg, 50 mg, Oral, Q4H PRN, Arabella Schuster MD, 50 mg at 12/26/22 2140    Vital Signs:  Vitals:    12/27/22 1117   BP: (!) 120/57   Pulse: 69   Resp: 18   Temp: 97.1 °F (36.2 °C)       In/Out:  Intake/Output - Last 3 Shifts         12/25 0700 12/26 0659 12/26 0700 12/27 0659 12/27 0700  12/28 0659    P.O. 720 650     Total Intake 720 650     Net +720 +650            Urine Occurrence 5 x 4 x     Stool Occurrence 3 x 0 x           Physical Exam:  General: Alert, oriented, in no apparent distress  HEENT: Sclera anicteric, trachea midline  Lungs: Normal respiratory rate and effort on room air  Abdomen: Soft, nontender, nondistended. Midline ECFistula with slight prolapse, wound manager in place reinforced superiorly without leakage, soft brown stool in bag. LUQ end colostomy pink and well perfused without gas or stool in bag.  Extremities: Warm, well perfused, no edema  Neuro: Grossly intact, moves all extremities  Psych: Appropriate affect    Laboratory Studies:  Complete Blood Count:  Lab Results   Component Value Date/Time    WBC 3.02 (L) 12/22/2022 01:33 PM    HGB 11.2 (L) 12/22/2022 01:33 PM    HCT 36.7 (L) 12/22/2022 01:33 PM    HCT 46 05/06/2022 09:55 PM    RBC 4.44 12/22/2022 01:33 PM     12/22/2022 01:33 PM       Basic Chemistry Panel:  Lab Results   Component Value Date/Time     (L) 12/22/2022 01:32 PM    K 4.1 12/22/2022 01:32 PM    CL 96 12/22/2022 01:32 PM    CO2 30 (H) 12/22/2022 01:32 PM    BUN 21 12/22/2022 01:32 PM    CREATININE 0.8 12/22/2022 01:32 PM    CALCIUM 8.8 12/22/2022 01:32 PM        Hepatic Panel:  Lab Results   Component Value Date/Time    AST 45 (H) 12/22/2022 01:32 PM    ALT 26 12/22/2022 01:32 PM    ALKPHOS 134 12/22/2022 01:32 PM    BILITOT 0.6 12/22/2022 01:32 PM    ALBUMIN 2.2 (L) 12/22/2022 01:32 PM       Coagulation Panel:  Lab Results   Component Value Date/Time    INR 1.2 07/21/2022 04:28 AM       Imaging Studies:  Echo (12/23/22):  The left ventricle is severely enlarged with eccentric hypertrophy and severely decreased systolic function. The estimated ejection fraction is 10%.  Normal right ventricular size with mildly reduced right ventricular systolic function.  Grade II left ventricular diastolic dysfunction.  Biatrial enlargement.  The estimated PA systolic pressure is 51 mmHg.  Normal central venous pressure (3 mmHg).    Assessment:  Odette Hart is a 67 y.o. year old female with history of CAD and PCI in 1/2022, HF (EF 10%), HTN, ICD, chronic hypoxemic respiratory failure, MDD, debility, severe malnutrition, recent acute cholecystitis who underwent an open takedown of colovesical and colovaginal fistuli (two separate areas) with sigmoid colectomy, partial cecectomy and appendectomy, drainage of intra-abdominal abscess and end colostomy (with cholecystectomy by Dr. Epstein) on 5/9/2022, complicated by readmission for management of shortness of breath/HF exacerbation, severe malnutrition, and wound care who is now admitted for transverse colocutaneous fistula.    Plan:  Wound manager to be changed by wound care team today - will plan to cover end colostomy with dry gauze without stoma appliance in order to provide better seal for wound manager given minimal output  Continue daily psyllium  Continue home medications. Medications reconciled with patient in management of her co-morbidities described above - currently ordered medications are what she is taking at home and has been stable on - will monitor prealbumin and hyponatremia  Continue diabetic diet, insulin for  goal euglycemia, Boost at least 3 times daily for nutritional optimization  Encourage ambulation multiple times per day - OOB as much as possible  Echocardiogram completed 12/23/22  Colonoscopy in 1 year (7/2023)     Patient discussed with attending, Dr. Cardozo.    Arabella Schuster MD  Colon and Rectal Surgery Fellow      I have personally obtained a history and performed a physical exam with and independent to my resident and discussed the findings and plan with the patient.  I agree with the above findings and plan with the following exceptions:  None    H, Wesley Lee MD, FACS, FASCRS  Staff Surgeon  Dept of Colon and Rectal Surgery  [facility] [place], [place]

## 2022-12-27 NOTE — PROGRESS NOTES
[facility] - Telemetry Stepdown  Wound Care    Patient Name:  Odette Hart   MRN:  [id number]  Date: 12/27/2022  Diagnosis: Colocutaneous fistula    History:     Past Medical History:   Diagnosis Date    Acute coronary syndrome     Acute exacerbation of chronic obstructive pulmonary disease (COPD) 3/23/2022    Allergy     Arthritis     Cardiomyopathy     CHF (congestive heart failure)     Coronary artery disease     Coronary artery disease of native artery of native heart with stable angina pectoris 4/19/2021 1/24/2022: [facility]: Cath: LAD: Proximal stent patent. LCX: Proximal 80%. LCX: Dominant. Moderate disease. LCX: HEVER 2.75 x 18 mm. Distal dissection. HEVER 2.75 x 22 mm.     Diverticulitis     Diverticulosis     Familial hypercholesterolemia 1/22/2022    Former smoker 1/24/2022    Heart attack     Heart disease     History of myocardial infarction 1/24/2022    Hyperlipidemia     Hypertension     Hypertension 1/24/2022    ICD (implantable cardioverter-defibrillator) in place     Non-ST elevation myocardial infarction (NSTEMI) 2/4/2019       Social History     Socioeconomic History    Marital status:    Tobacco Use    Smoking status: Former     Packs/day: 0.50     Types: Cigarettes     Start date: 2/4/1976     Quit date: 12/4/2012     Years since quitting: 10.0    Smokeless tobacco: Never   Substance and Sexual Activity    Alcohol use: No    Drug use: No     Social Determinants of Health     Financial Resource Strain: Low Risk     Difficulty of Paying Living Expenses: Not hard at all   Food Insecurity: No Food Insecurity    Worried About Running Out of Food in the Last Year: Never true    Ran Out of Food in the Last Year: Never true   Transportation Needs: No Transportation Needs    Lack of Transportation (Medical): No    Lack of Transportation (Non-Medical): No   Physical Activity: Inactive    Days of Exercise per Week: 0 days    Minutes of Exercise per Session: 0 min   Stress: No Stress Concern Present     Feeling of Stress : Not at all   Social Connections: Moderately Integrated    Frequency of Communication with Friends and Family: More than three times a week    Frequency of Social Gatherings with Friends and Family: More than three times a week    Attends S[employer] Services: 1 to 4 times per year    Active Member of Clubs or Organizations: Yes    Attends Club or Organization Meetings: 1 to 4 times per year    Marital Status:    Housing Stability: Low Risk     Unable to Pay for Housing in the Last Year: No    Number of Places Lived in the Last Year: 1    Unstable Housing in the Last Year: No       Precautions:     Allergies as of 12/22/2022 - Reviewed 12/22/2022   Allergen Reaction Noted    Augmentin [amoxicillin-pot clavulanate] Swelling 02/19/2018    Lisinopril Other (See Comments) 02/19/2018    Shellfish containing products Anaphylaxis 09/10/2018    Levaquin [levofloxacin] Other (See Comments) 09/18/2018    Clindamycin Palpitations 01/21/2019       [facility] Assessment Details/Treatment     Wound/ostomy follow up performed.  Wound pouch cut with guide.  Adhesive removal to area.  Removed old pouch, cleaned with warm water, patted dry.  Skin barrier applied and allowed to dry.  New pouch applied with tail cut to make emptying easier.  Clip applied.  Patient verbalized understanding. Supplies left at bedside.  Will have patient do application tomorrow to prepare for discharge.      12/27/2022

## 2022-12-27 NOTE — PLAN OF CARE
Problem: Adult Inpatient Plan of Care  Goal: Plan of Care Review  12/26/2022 1858 by Stephanie Rogers RN  Outcome: Ongoing, Progressing  Flowsheets (Taken 12/26/2022 1858)  Plan of Care Reviewed With: patient  12/26/2022 1858 by Stephanie Rogers RN  Outcome: Ongoing, Progressing  Goal: Patient-Specific Goal (Individualized)  12/26/2022 1858 by Stephanie Rogers RN  Outcome: Ongoing, Progressing  12/26/2022 1858 by Stephanie Rogers RN  Outcome: Ongoing, Progressing  Goal: Absence of Hospital-Acquired Illness or Injury  12/26/2022 1858 by Stephanie Rogers RN  Outcome: Ongoing, Progressing  12/26/2022 1858 by Stephanie Rogers RN  Outcome: Ongoing, Progressing  Intervention: Identify and Manage Fall Risk  Flowsheets (Taken 12/26/2022 1858)  Safety Promotion/Fall Prevention:   assistive device/personal item within reach   nonskid shoes/socks when out of bed   medications reviewed   lighting adjusted  Intervention: Prevent Skin Injury  Flowsheets (Taken 12/26/2022 1858)  Body Position: position changed independently  Skin Protection: adhesive use limited  Intervention: Prevent and Manage VTE (Venous Thromboembolism) Risk  Flowsheets (Taken 12/26/2022 1858)  Activity Management:   Ambulated to bathroom - L4   Ambulated in room - L4  VTE Prevention/Management:   ambulation promoted   fluids promoted  Intervention: Prevent Infection  Flowsheets (Taken 12/26/2022 1858)  Infection Prevention:   hand hygiene promoted   single patient room provided   environmental surveillance performed   personal protective equipment utilized   equipment surfaces disinfected   rest/sleep promoted  Goal: Optimal Comfort and Wellbeing  12/26/2022 1858 by Stephanie Rogers RN  Outcome: Ongoing, Progressing  12/26/2022 1858 by Stephanie Rogers RN  Outcome: Ongoing, Progressing  Intervention: Monitor Pain and Promote Comfort  Flowsheets (Taken 12/26/2022 1858)  Pain Management Interventions:   around-the-clock dosing utilized   care clustered   pillow  support provided   position adjusted   pain management plan reviewed with patient/caregiver   quiet environment facilitated  Intervention: Provide Person-Centered Care  Flowsheets (Taken 12/26/2022 1858)  Trust Relationship/Rapport:   care explained   questions answered   choices provided   questions encouraged   emotional support provided   reassurance provided   empathic listening provided   thoughts/feelings acknowledged  Goal: Readiness for Transition of Care  12/26/2022 1858 by Stephanie Rogers RN  Outcome: Ongoing, Progressing  12/26/2022 1858 by Stephanie Rogers RN  Outcome: Ongoing, Progressing  Intervention: Mutually Develop Transition Plan  Flowsheets (Taken 12/26/2022 1858)  Equipment Currently Used at Home: none     Problem: Fall Injury Risk  Goal: Absence of Fall and Fall-Related Injury  12/26/2022 1858 by Stephanie Rogers RN  Outcome: Ongoing, Progressing  12/26/2022 1858 by Stephanie Rogers RN  Outcome: Ongoing, Progressing  Intervention: Identify and Manage Contributors  Flowsheets (Taken 12/26/2022 1858)  Self-Care Promotion:   independence encouraged   meal set-up provided   BADL personal objects within reach   BADL personal routines maintained   safe use of adaptive equipment encouraged  Medication Review/Management: medications reviewed  Intervention: Promote Injury-Free Environment  Flowsheets (Taken 12/26/2022 1858)  Safety Promotion/Fall Prevention:   assistive device/personal item within reach   nonskid shoes/socks when out of bed   medications reviewed   lighting adjusted

## 2022-12-28 NOTE — SUBJECTIVE & OBJECTIVE
Subjective:     Interval History: no acute events overnite, wound appliance no leaking, no n/v, eating    Post-Op Info:  * No surgery found *          Medications:  Continuous Infusions:  Scheduled Meds:   aspirin  81 mg Oral Daily    clopidogreL  75 mg Oral Daily    ezetimibe  10 mg Oral QHS    famotidine  20 mg Oral Daily    fluticasone propionate  1 spray Each Nostril Daily    furosemide  40 mg Oral Daily    losartan  25 mg Oral Daily    metoprolol succinate  25 mg Oral Daily    psyllium husk (aspartame)  1 packet Oral Daily     PRN Meds:   albuterol    ALPRAZolam    dextrose 10%    dextrose 10%    glucagon (human recombinant)    glucose    glucose    HYDROmorphone    insulin aspart U-100    naloxone    prochlorperazine    sodium chloride 0.9%    traMADoL        Objective:     Vital Signs (Most Recent):  Temp: 98.6 °F (37 °C) (12/28/22 1156)  Pulse: 75 (12/28/22 1156)  Resp: 18 (12/28/22 1156)  BP: (!) 89/53 (12/28/22 1156)  SpO2: (!) 94 % (12/28/22 1156)   Vital Signs (24h Range):  Temp:  [97.7 °F (36.5 °C)-99.8 °F (37.7 °C)] 98.6 °F (37 °C)  Pulse:  [62-88] 75  Resp:  [18-20] 18  SpO2:  [93 %-100 %] 94 %  BP: ()/(51-58) 89/53     Intake/Output - Last 3 Shifts         12/26 0700  12/27 0659 12/27 0700 12/28 0659 12/28 0700  12/29 0659    P.O. 650 400     Total Intake 650 400     Net +650 +400            Urine Occurrence 4 x 7 x     Stool Occurrence 0 x 0 x             Physical Exam  Vitals and nursing note reviewed.   Constitutional:       Appearance: She is well-developed.   HENT:      Head: Normocephalic.   Eyes:      Pupils: Pupils are equal, round, and reactive to light.   Cardiovascular:      Rate and Rhythm: Normal rate and regular rhythm.      Heart sounds: Normal heart sounds.   Pulmonary:      Effort: Pulmonary effort is normal. No respiratory distress.      Breath sounds: Normal breath sounds. No wheezing or rales.   Abdominal:      Palpations: Abdomen is soft. There is no mass.       Tenderness: There is no guarding or rebound.      Comments: Wound appliance in place, no leaking   Skin:     General: Skin is warm and dry.   Neurological:      Mental Status: She is alert and oriented to person, place, and time.   Psychiatric:      Comments: Hx of short term memory loss, in nursing home  Mentality at baseline       Significant Labs:  BMP (Last 3 Results):   Recent Labs   Lab 12/22/22  1332   *   *   K 4.1   CL 96   CO2 30*   BUN 21   CREATININE 0.8   CALCIUM 8.8     CBC (Last 3 Results):   Recent Labs   Lab 12/22/22  1333   WBC 3.02*   RBC 4.44   HGB 11.2*   HCT 36.7*      MCV 83   MCH 25.2*   MCHC 30.5*       Significant Diagnostics:  None

## 2022-12-28 NOTE — ASSESSMENT & PLAN NOTE
Patient is chronically on statin.will continue for now. Monitor clinically. Last LDL was   Lab Results   Component Value Date    LDLCALC 70.6 08/30/2022

## 2022-12-28 NOTE — ASSESSMENT & PLAN NOTE
Appreciate help from wound care  Wound appliance changed today 12/28, no leakage but changes to plan due to insurance issues  Will keep one more day to make sure new appliance stays in place for 24 hours with no leakage

## 2022-12-28 NOTE — PLAN OF CARE
[facility] - Telemetry Stepdown  Discharge Reassessment    Primary Care Provider: Braxton Barragan MD    Expected Discharge Date: 12/30/2022    Reassessment (most recent)       Discharge Reassessment - 12/28/22 1425          Discharge Reassessment    Assessment Type Discharge Planning Reassessment     Did the patient's condition or plan change since previous assessment? No     Discharge Plan discussed with: Patient     Discharge Plan A Home     Discharge Plan B Home Health     DME Needed Upon Discharge  other (see comments)   TBD    Discharge Barriers Identified None     Why the patient remains in the hospital Requires continued medical care        Post-Acute Status    Post-Acute Authorization Other     Other Status No Post-Acute Service Needs                   Alejandra Crandall RN  Ext [phone]

## 2022-12-28 NOTE — ASSESSMENT & PLAN NOTE
Chronic, controlled.  Latest blood pressure and vitals reviewed-   Temp:  [97.7 °F (36.5 °C)-99.8 °F (37.7 °C)]   Pulse:  [62-88]   Resp:  [18-20]   BP: ()/(51-58)   SpO2:  [93 %-100 %] .   Home meds for hypertension were reviewed and noted below.   Hypertension Medications             furosemide (LASIX) 40 MG tablet Take 1 tablet (40 mg total) by mouth 2 (two) times daily.    losartan (COZAAR) 50 MG tablet Take 1 tablet (50 mg total) by mouth once daily.    metoprolol succinate (TOPROL-XL) 25 MG 24 hr tablet Take 1 tablet (25 mg total) by mouth once daily.    nitroGLYCERIN (NITROSTAT) 0.4 MG SL tablet Place 1 tablet (0.4 mg total) under the tongue every 5 (five) minutes as needed for Chest pain.    spironolactone (ALDACTONE) 25 MG tablet Take 1 tablet (25 mg total) by mouth once daily.          While in the hospital, will manage blood pressure as follows; Continue home antihypertensive regimen    Will utilize p.r.n. blood pressure medication only if patient's blood pressure greater than  180/110 and she develops symptoms such as worsening chest pain or shortness of breath.

## 2022-12-28 NOTE — PROGRESS NOTES
Follow up performed for appliance application by patient.  Had some trouble cutting flange with template due to weakness in hands, but did manage.  Will supply with precut appliances to go home to make application easier.  Performed all other steps fairly well.  Sprayed adhesive remover, removed appliance, cleaned with warm water, patted dry with gauze, needed assistance spraying skin barrier, applied appliance, molded with hands and hot pad. Does want to make sure that appliance will stay in place without leakage tonight.  Supplies left in nightstand in case appliance has to be changed over night and will follow up in am.  Will make every effort to provide patient with supplies that are easy for her to use like wipes versus sprays.

## 2022-12-28 NOTE — ASSESSMENT & PLAN NOTE
Patient is identified as having Combined Systolic and Diastolic heart failure that is Chronic. CHF is currently controlled. Latest ECHO performed and demonstrates- Results for orders placed during the hospital encounter of 12/22/22    Echo Saline Bubble? No    Interpretation Summary  · The left ventricle is severely enlarged with eccentric hypertrophy and severely decreased systolic function. The estimated ejection fraction is 10%.  · Normal right ventricular size with mildly reduced right ventricular systolic function.  · Grade II left ventricular diastolic dysfunction.  · Biatrial enlargement.  · The estimated PA systolic pressure is 51 mmHg.  · Normal central venous pressure (3 mmHg).  . Continue home meds

## 2022-12-28 NOTE — PROGRESS NOTES
[facility] - Telemetry Stepdown  Colorectal Surgery  Progress Note    Patient Name: Odette Hart  MRN: [id number]  Admission Date: 12/22/2022  Hospital Length of Stay: 6 days  Attending Physician: Rafa Cardozo MD    Subjective:     Interval History: no acute events overnite, wound appliance no leaking, no n/v, eating    Post-Op Info:  * No surgery found *          Medications:  Continuous Infusions:  Scheduled Meds:   aspirin  81 mg Oral Daily    clopidogreL  75 mg Oral Daily    ezetimibe  10 mg Oral QHS    famotidine  20 mg Oral Daily    fluticasone propionate  1 spray Each Nostril Daily    furosemide  40 mg Oral Daily    losartan  25 mg Oral Daily    metoprolol succinate  25 mg Oral Daily    psyllium husk (aspartame)  1 packet Oral Daily     PRN Meds:   albuterol    ALPRAZolam    dextrose 10%    dextrose 10%    glucagon (human recombinant)    glucose    glucose    HYDROmorphone    insulin aspart U-100    naloxone    prochlorperazine    sodium chloride 0.9%    traMADoL        Objective:     Vital Signs (Most Recent):  Temp: 98.6 °F (37 °C) (12/28/22 1156)  Pulse: 75 (12/28/22 1156)  Resp: 18 (12/28/22 1156)  BP: (!) 89/53 (12/28/22 1156)  SpO2: (!) 94 % (12/28/22 1156)   Vital Signs (24h Range):  Temp:  [97.7 °F (36.5 °C)-99.8 °F (37.7 °C)] 98.6 °F (37 °C)  Pulse:  [62-88] 75  Resp:  [18-20] 18  SpO2:  [93 %-100 %] 94 %  BP: ()/(51-58) 89/53     Intake/Output - Last 3 Shifts         12/26 0700 12/27 0659 12/27 0700 12/28 0659 12/28 0700 12/29 0659    P.O. 650 400     Total Intake 650 400     Net +650 +400            Urine Occurrence 4 x 7 x     Stool Occurrence 0 x 0 x             Physical Exam  Vitals and nursing note reviewed.   Constitutional:       Appearance: She is well-developed.   HENT:      Head: Normocephalic.   Eyes:      Pupils: Pupils are equal, round, and reactive to light.   Cardiovascular:      Rate and Rhythm: Normal rate and regular rhythm.      Heart  sounds: Normal heart sounds.   Pulmonary:      Effort: Pulmonary effort is normal. No respiratory distress.      Breath sounds: Normal breath sounds. No wheezing or rales.   Abdominal:      Palpations: Abdomen is soft. There is no mass.      Tenderness: There is no guarding or rebound.      Comments: Wound appliance in place, no leaking   Skin:     General: Skin is warm and dry.   Neurological:      Mental Status: She is alert and oriented to person, place, and time.   Psychiatric:      Comments: Hx of short term memory loss, in nursing home  Mentality at baseline       Significant Labs:  BMP (Last 3 Results):   Recent Labs   Lab 12/22/22  1332   *   *   K 4.1   CL 96   CO2 30*   BUN 21   CREATININE 0.8   CALCIUM 8.8     CBC (Last 3 Results):   Recent Labs   Lab 12/22/22  1333   WBC 3.02*   RBC 4.44   HGB 11.2*   HCT 36.7*      MCV 83   MCH 25.2*   MCHC 30.5*       Significant Diagnostics:  None    Assessment/Plan:     * Colocutaneous fistula  Appreciate help from wound care  Wound appliance changed today 12/28, no leakage but changes to plan due to insurance issues  Will keep one more day to make sure new appliance stays in place for 24 hours with no leakage    Hypertension  Chronic, controlled.  Latest blood pressure and vitals reviewed-   Temp:  [97.7 °F (36.5 °C)-99.8 °F (37.7 °C)]   Pulse:  [62-88]   Resp:  [18-20]   BP: ()/(51-58)   SpO2:  [93 %-100 %] .   Home meds for hypertension were reviewed and noted below.   Hypertension Medications             furosemide (LASIX) 40 MG tablet Take 1 tablet (40 mg total) by mouth 2 (two) times daily.    losartan (COZAAR) 50 MG tablet Take 1 tablet (50 mg total) by mouth once daily.    metoprolol succinate (TOPROL-XL) 25 MG 24 hr tablet Take 1 tablet (25 mg total) by mouth once daily.    nitroGLYCERIN (NITROSTAT) 0.4 MG SL tablet Place 1 tablet (0.4 mg total) under the tongue every 5 (five) minutes as needed for Chest pain.    spironolactone  (ALDACTONE) 25 MG tablet Take 1 tablet (25 mg total) by mouth once daily.          While in the hospital, will manage blood pressure as follows; Continue home antihypertensive regimen    Will utilize p.r.n. blood pressure medication only if patient's blood pressure greater than  180/110 and she develops symptoms such as worsening chest pain or shortness of breath.    History of myocardial infarction  Tele  Continue home meds    Normocytic anemia  monitior  Labs  Enc diet    Familial hypercholesterolemia   Patient is chronically on statin.will continue for now. Monitor clinically. Last LDL was   Lab Results   Component Value Date    LDLCALC 70.6 08/30/2022        RBBB with left anterior fascicular block  Tele  Monitor vs    Chronic congestive heart failure  Patient is identified as having Combined Systolic and Diastolic heart failure that is Chronic. CHF is currently controlled. Latest ECHO performed and demonstrates- Results for orders placed during the hospital encounter of 12/22/22    Echo Saline Bubble? No    Interpretation Summary  · The left ventricle is severely enlarged with eccentric hypertrophy and severely decreased systolic function. The estimated ejection fraction is 10%.  · Normal right ventricular size with mildly reduced right ventricular systolic function.  · Grade II left ventricular diastolic dysfunction.  · Biatrial enlargement.  · The estimated PA systolic pressure is 51 mmHg.  · Normal central venous pressure (3 mmHg).  . Continue home meds    History of coronary artery stent placement  Resume home meds  tele     Automatic implantable cardioverter-defibrillator in situ  tele    Ischemic cardiomyopathy  Continue home meds  Tele  acurate I&O          Carola Buckley NP  Colorectal Surgery  [facility] - Telemetry Stepdown

## 2022-12-29 NOTE — PLAN OF CARE
Problem: Adult Inpatient Plan of Care  Goal: Plan of Care Review  Outcome: Ongoing, Progressing  Goal: Optimal Comfort and Wellbeing  Outcome: Ongoing, Progressing  Goal: Readiness for Transition of Care  Outcome: Ongoing, Progressing     Problem: Fall Injury Risk  Goal: Absence of Fall and Fall-Related Injury  Outcome: Ongoing, Progressing

## 2022-12-29 NOTE — PLAN OF CARE
[facility] - Telemetry Stepdown      HOME HEALTH ORDERS  FACE TO FACE ENCOUNTER    Patient Name: Odette Hart  YOB: 1955    PCP: Braxton Barragan MD   PCP Address: [address] / [place] [address]  PCP Phone Number: [phone]  PCP Fax: [phone]    Encounter Date: 12/22/22    Admit to Home Health    Diagnoses:  Active Hospital Problems    Diagnosis  POA    *Colocutaneous fistula [K63.2]  Yes    Heart failure, systolic and diastolic, chronic [I50.42]  Yes     1/24/2022: Echo: Severely enlarged left ventricle with severe systolic dysfunction. EF 20%. Mid anteroseptal, anterior and apical akinesia. Smoke LV. Severe diastolic dysfunction. Severely enlarged LA. Mild to moderate MR. ICD.         Severe protein-calorie malnutrition [E43]  Yes    Hypoalbuminemia [E88.09]  Yes    Increased nutritional needs [R63.8]  Yes    Recurrent major depressive disorder [F33.9]  Yes    Hyponatremia [E87.1]  Yes    Mood disorder [F39]  Yes    COPD (chronic obstructive pulmonary disease) [J44.9]  Yes    Leukopenia [D72.819]  Yes    History of myocardial infarction [I25.2]  Not Applicable     2012: [place]: Anterior MI. LAD stent.  1/21/2022: NSTEMI. Troponin 9.        Hypertension [I10]  Yes     2012: Diagnosed.      Former smoker [Z87.891]  Not Applicable     1974: Began smoking. 0.5 ppd.  2012: Quit.      Normocytic anemia [D64.9]  Yes    Familial hypercholesterolemia [E78.01]  Yes    Coronary artery disease [I25.10]  Yes     2012: [place]: Anterior MI. LAD stent.  1/21/2022: NSTEMI. Troponin 9.  1/24/2022: [facility]: Cath: LAD: Proximal stent patent. LCX: Proximal 80%. LCX: Dominant. Moderate disease. LCX: HEVER 2.75 x 18 mm. Distal dissection. HEVER 2.75 x 22 mm.        History of diverticulitis [Z87.19]  Not Applicable     2- CT   1. Sigmoid colon extensive diverticulosis with wall thickening and surrounding fat stranding suggestive of recurrent diverticulitis.  No free air or intraperitoneal fluid  collection.  Given the chronicity of sigmoid colon wall thickening compared to the October 25, 2021 examination, consider sigmoidoscopy follow-up to exclude neoplasia.  2. No rectal contrast extravasation to suggest rectovaginal fistula, however there is persistent gas within the bladder, cul-de-sac, and left adnexae which could be seen in vesicovaginal fistula or colorectal fistula.  An inflamed diverticulum is contiguous with abnormal urinary bladder wall thickening superolaterally on the left.  CT findings are suspicious but not definitive or colovesical fistula.  Could be further evaluated with nonemergent fluoroscopic examination, as clinically indicated.      RBBB with left anterior fascicular block [I45.2]  Yes     2021: Noted.      Chronic congestive heart failure [I50.9]  Yes    Ischemic cardiomyopathy [I25.5]  Yes     1/24/2022: Echo: Severely enlarged left ventricle with severe systolic dysfunction. EF 20%.         Automatic implantable cardioverter-defibrillator in situ [Z95.810]  Yes     2014: [employer] ICD.         History of coronary artery stent placement [Z95.5]  Not Applicable     2012: [place]: Anterior MI. LAD stent.  1/24/2022: [facility]: Cath: LCX: HEVER 2.75 x 18 mm. HEVER 2.75 x 22 mm.          Resolved Hospital Problems   No resolved problems to display.       Follow Up Appointments:  Future Appointments   Date Time Provider Department Center   1/24/2023  9:45 AM Braxton Barragan MD [facility]   3/29/2023 11:45 AM Yohannes Cordova MD [id number]       Allergies:  Review of patient's allergies indicates:   Allergen Reactions    Augmentin [amoxicillin-pot clavulanate] Swelling     Not allergic to amoxicillin just a derivative of augmentin    Lisinopril Other (See Comments)     Fluid around heart    Shellfish containing products Anaphylaxis     Pt.states she is allergic to SEAFOOD since the age of 13    Levaquin [levofloxacin] Other (See Comments)     Very bad joint  pain    Clindamycin Palpitations     Chest pain       Medications: Review discharge medications with patient and family and provide education.    Current Facility-Administered Medications   Medication Dose Route Frequency Provider Last Rate Last Admin    albuterol inhaler 2 puff  2 puff Inhalation Q6H PRN Rafa Cardozo MD        ALPRAZolam tablet 0.5 mg  0.5 mg Oral Nightly PRN Rafa Cardozo MD   0.5 mg at 12/28/22 2112    aspirin EC tablet 81 mg  81 mg Oral Daily Rafa Cardozo MD   81 mg at 12/29/22 1009    clopidogreL tablet 75 mg  75 mg Oral Daily Rafa Cardozo MD   75 mg at 12/29/22 1009    dextrose 10% bolus 125 mL 125 mL  12.5 g Intravenous PRN Rafa Cardozo MD        dextrose 10% bolus 250 mL 250 mL  25 g Intravenous PRN Rafa Cardozo MD        ezetimibe tablet 10 mg  10 mg Oral QHS Rafa Cardozo MD   10 mg at 12/28/22 2112    famotidine tablet 20 mg  20 mg Oral Daily Rafa Cardozo MD   20 mg at 12/29/22 1009    fluticasone propionate 50 mcg/actuation nasal spray 50 mcg  1 spray Each Nostril Daily Rafa Cardozo MD   50 mcg at 12/28/22 0957    furosemide tablet 40 mg  40 mg Oral Daily Rafa Cardozo MD   40 mg at 12/29/22 1009    glucagon (human recombinant) injection 1 mg  1 mg Intramuscular PRN Rafa Cardozo MD        glucose chewable tablet 16 g  16 g Oral PRN Rafa Cardozo MD        glucose chewable tablet 24 g  24 g Oral PRN Rafa Cardozo MD        HYDROmorphone injection 0.5 mg  0.5 mg Intravenous Q6H PRN Carola Buckley NP        insulin aspart U-100 pen 1-10 Units  1-10 Units Subcutaneous QID (AC + HS) PRN Rafa Cardozo MD        losartan tablet 25 mg  25 mg Oral Daily Rafa Cardozo MD   25 mg at 12/29/22 1009    metoprolol succinate (TOPROL-XL) 24 hr tablet 25 mg  25 mg Oral Daily Rafa Cardozo MD   25 mg at 12/29/22 1009    naloxone 0.4 mg/mL injection 0.02 mg  0.02 mg Intravenous PRN Carola BENAVIDES.  WINNIE Buckley        prochlorperazine injection Soln 5 mg  5 mg Intravenous Q6H PRN Carola Buckley NP   5 mg at 12/25/22 2320    psyllium husk (aspartame) 3.4 gram packet 1 packet  1 packet Oral Daily Pablo Ramirez MD        sodium chloride 0.9% flush 10 mL  10 mL Intravenous PRN Carola Buckley NP        traMADoL tablet 50 mg  50 mg Oral Q4H PRN Arabella Schuster MD   50 mg at 12/28/22 2112     Current Discharge Medication List        CONTINUE these medications which have NOT CHANGED    Details   acetaminophen (TYLENOL) 500 MG tablet Take 2 tablets (1,000 mg total) by mouth every 8 (eight) hours as needed for Pain.  Refills: 0      albuterol (PROVENTIL/VENTOLIN HFA) 90 mcg/actuation inhaler Inhale 2 puffs into the lungs every 6 (six) hours as needed for Wheezing. Rescue  Qty: 18 g, Refills: 1    Associated Diagnoses: Bronchitis      ALPRAZolam (XANAX) 0.5 MG tablet Take 1 tablet (0.5 mg total) by mouth nightly as needed for Anxiety.  Qty: 30 tablet, Refills: 1    Associated Diagnoses: Anxiety      aspirin (ECOTRIN) 81 MG EC tablet Take 1 tablet (81 mg total) by mouth once daily.  Qty: 90 tablet, Refills: 3      clopidogreL (PLAVIX) 75 mg tablet Take 1 tablet (75 mg total) by mouth once daily.  Qty: 90 tablet, Refills: 3      collagenase (SANTYL) ointment Apply topically once daily.  Qty: 90 g, Refills: 1      docusate sodium (COLACE) 100 MG capsule Take 1 capsule (100 mg total) by mouth 2 (two) times daily.  Qty: 60 capsule, Refills: 1      empagliflozin (JARDIANCE) 10 mg tablet Take 1 tablet (10 mg total) by mouth once daily.  Qty: 30 tablet, Refills: 11      ezetimibe (ZETIA) 10 mg tablet Take 1 tablet (10 mg total) by mouth every evening.  Qty: 90 tablet, Refills: 3      famotidine (PEPCID) 20 MG tablet Take 1 tablet (20 mg total) by mouth once daily.  Qty: 30 tablet, Refills: 0      fluticasone propionate (FLONASE) 50 mcg/actuation nasal spray 1 spray (50 mcg total) by Each Nostril route once  daily.  Qty: 9.9 mL, Refills: 1    Associated Diagnoses: Sinusitis, unspecified chronicity, unspecified location      food supplemt, lactose-reduced (ENSURE HIGH PROTEIN) Liqd Take 240 mLs by mouth 2 (two) times a day.  Qty: 60 each, Refills: 3    Associated Diagnoses: Weight loss; FTT (failure to thrive) in adult      furosemide (LASIX) 40 MG tablet Take 1 tablet (40 mg total) by mouth 2 (two) times daily.  Qty: 220 tablet, Refills: 3      losartan (COZAAR) 50 MG tablet Take 1 tablet (50 mg total) by mouth once daily.  Qty: 30 tablet, Refills: 11    Associated Diagnoses: Heart failure, systolic and diastolic, chronic      metoprolol succinate (TOPROL-XL) 25 MG 24 hr tablet Take 1 tablet (25 mg total) by mouth once daily.  Qty: 90 tablet, Refills: 3      nitroGLYCERIN (NITROSTAT) 0.4 MG SL tablet Place 1 tablet (0.4 mg total) under the tongue every 5 (five) minutes as needed for Chest pain.  Qty: 25 tablet, Refills: 3      ondansetron (ZOFRAN-ODT) 8 MG TbDL Take 1 tablet (8 mg total) by mouth every 12 (twelve) hours as needed (nausea).  Qty: 20 tablet, Refills: 2    Associated Diagnoses: Nausea      polyethylene glycol (GLYCOLAX) 17 gram/dose powder Take 17 g by mouth once daily.  Qty: 510 g, Refills: 1      promethazine (PHENERGAN) 25 MG tablet Take 1 tablet (25 mg total) by mouth every 6 (six) hours as needed for Nausea.  Qty: 15 tablet, Refills: 0    Associated Diagnoses: Acute cystitis without hematuria; Non-intractable vomiting with nausea, unspecified vomiting type      simethicone (MYLICON) 80 MG chewable tablet Take 1 tablet (80 mg total) by mouth every 8 (eight) hours as needed for Flatulence.  Qty: 120 tablet, Refills: 5    Associated Diagnoses: S/P colostomy; Abdominal gas pain      spironolactone (ALDACTONE) 25 MG tablet Take 1 tablet (25 mg total) by mouth once daily.  Qty: 90 tablet, Refills: 3    Associated Diagnoses: Heart failure, systolic and diastolic, chronic      traMADoL (ULTRAM) 50 mg tablet  Take 1 tablet (50 mg total) by mouth every 12 (twelve) hours as needed for Pain.  Qty: 30 tablet, Refills: 0    Comments: Quantity prescribed more than 7 day supply? Yes, quantity medically necessary  Associated Diagnoses: Acute cystitis without hematuria; Non-intractable vomiting with nausea               I have seen and examined this patient within the last 30 days. My clinical findings that support the need for the home health skilled services and home bound status are the following:no   Weakness/numbness causing balance and gait disturbance due to Surgery making it taxing to leave home.     Diet:   regular diet    Labs:  na    Referrals/ Consults   to evaluate for community resources/long-range planning.    Activities:   activity as tolerated    Nursing:   Agency to admit patient within  of hospital discharge unless specified on physician order or at patient request    SN to complete comprehensive assessment including routine vital signs. Instruct on disease process and s/s of complications to report to MD. Review/verify medication list sent home with the patient at time of discharge  and instruct patient/caregiver as needed. Frequency may be adjusted depending on start of care date.     Skilled nurse to perform up to 3 visits PRN for symptoms related to diagnosis    Notify MD if SBP > 160 or < 90; DBP > 90 or < 50; HR > 120 or < 50; Temp > 101; O2 < 88%; Other:       Ok to schedule additional visits based on staff availability and patient request on consecutive days within the home health episode.    When multiple disciplines ordered:    Start of Care occurs on Sunday - Wednesday schedule remaining discipline evaluations as ordered on separate consecutive days following the start of care.    Thursday SOC -schedule subsequent evaluations Friday and Monday the following week.     Friday - Saturday SOC - schedule subsequent discipline evaluations on consecutive days starting Monday of the following  week.    For all post-discharge communication and subsequent orders please contact patient's primary care physician. If unable to reach primary care physician or do not receive response within 30 minutes, please contact Dr. Cardozo for clinical staff order clarification        Wound Care Orders  yes:  Surgical Wound:  Location: abd, change appliance over abd entercutaneous fistula weekly or more often as needed        I certify that this patient is confined to her home and needs intermittent skilled nursing care.

## 2022-12-29 NOTE — PROGRESS NOTES
[facility] - Telemetry Stepdown  Colorectal Surgery  Progress Note    Patient Name: Odette Hart  MRN: [id number]  Admission Date: 12/22/2022  Hospital Length of Stay: 7 days  Attending Physician: Rafa Cardozo MD    Subjective:     Interval History: no acute events overnite, wound appliance not leaking overnite, eating     Post-Op Info:  * No surgery found *          Medications:  Continuous Infusions:  Scheduled Meds:   aspirin  81 mg Oral Daily    clopidogreL  75 mg Oral Daily    ezetimibe  10 mg Oral QHS    famotidine  20 mg Oral Daily    fluticasone propionate  1 spray Each Nostril Daily    furosemide  40 mg Oral Daily    losartan  25 mg Oral Daily    metoprolol succinate  25 mg Oral Daily    psyllium husk (aspartame)  1 packet Oral Daily     PRN Meds:   albuterol    ALPRAZolam    dextrose 10%    dextrose 10%    glucagon (human recombinant)    glucose    glucose    HYDROmorphone    insulin aspart U-100    naloxone    prochlorperazine    sodium chloride 0.9%    traMADoL        Objective:     Vital Signs (Most Recent):  Temp: 97.8 °F (36.6 °C) (12/29/22 1117)  Pulse: 80 (12/29/22 1117)  Resp: 17 (12/29/22 1117)  BP: (!) 107/56 (12/29/22 1117)  SpO2: 98 % (12/29/22 1117)   Vital Signs (24h Range):  Temp:  [97.7 °F (36.5 °C)-98 °F (36.7 °C)] 97.8 °F (36.6 °C)  Pulse:  [51-85] 80  Resp:  [17-20] 17  SpO2:  [95 %-99 %] 98 %  BP: ()/(45-56) 107/56     Intake/Output - Last 3 Shifts         12/27 0700 12/28 0659 12/28 0700 12/29 0659 12/29 0700 12/30 0659    P.O. 400 250     Total Intake 400 250     Net +400 +250            Urine Occurrence 7 x 9 x     Stool Occurrence 0 x 0 x             Physical Exam  Vitals and nursing note reviewed.   Constitutional:       Appearance: She is well-developed.   HENT:      Head: Normocephalic.   Eyes:      Pupils: Pupils are equal, round, and reactive to light.   Cardiovascular:      Rate and Rhythm: Normal rate and regular rhythm.      Heart  sounds: Normal heart sounds.   Pulmonary:      Effort: Pulmonary effort is normal. No respiratory distress.      Breath sounds: Normal breath sounds. No wheezing or rales.   Abdominal:      Palpations: Abdomen is soft. There is no mass.      Tenderness: There is no guarding or rebound.      Comments: ECF appliance not leaking   Skin:     General: Skin is warm and dry.   Neurological:      Mental Status: She is alert and oriented to person, place, and time.       Significant Labs:  BMP (Last 3 Results):   Recent Labs   Lab 12/22/22  1332   *   *   K 4.1   CL 96   CO2 30*   BUN 21   CREATININE 0.8   CALCIUM 8.8     CBC (Last 3 Results):   Recent Labs   Lab 12/22/22  1333   WBC 3.02*   RBC 4.44   HGB 11.2*   HCT 36.7*      MCV 83   MCH 25.2*   MCHC 30.5*       Significant Diagnostics:  None    Assessment/Plan:     * Colocutaneous fistula  Appreciate help from wound care  Wound appliance changed today 12/28, no leakage but changes to plan due to insurance issues  Will keep one more day to make sure new appliance stays in place for 24 hours with no leakage    Hypertension  Chronic, controlled.  Latest blood pressure and vitals reviewed-   Temp:  [97.7 °F (36.5 °C)-98 °F (36.7 °C)]   Pulse:  [51-85]   Resp:  [17-20]   BP: ()/(45-56)   SpO2:  [95 %-99 %] .   Home meds for hypertension were reviewed and noted below.   Hypertension Medications             furosemide (LASIX) 40 MG tablet Take 1 tablet (40 mg total) by mouth 2 (two) times daily.    losartan (COZAAR) 50 MG tablet Take 1 tablet (50 mg total) by mouth once daily.    metoprolol succinate (TOPROL-XL) 25 MG 24 hr tablet Take 1 tablet (25 mg total) by mouth once daily.    nitroGLYCERIN (NITROSTAT) 0.4 MG SL tablet Place 1 tablet (0.4 mg total) under the tongue every 5 (five) minutes as needed for Chest pain.    spironolactone (ALDACTONE) 25 MG tablet Take 1 tablet (25 mg total) by mouth once daily.          While in the hospital, will manage  blood pressure as follows; Continue home antihypertensive regimen    Will utilize p.r.n. blood pressure medication only if patient's blood pressure greater than  180/110 and she develops symptoms such as worsening chest pain or shortness of breath.    History of myocardial infarction  Tele  Continue home meds    Normocytic anemia  monitior  Labs  Enc diet    Familial hypercholesterolemia   Patient is chronically on statin.will continue for now. Monitor clinically. Last LDL was   Lab Results   Component Value Date    LDLCALC 70.6 08/30/2022        RBBB with left anterior fascicular block  Tele  Monitor vs    Chronic congestive heart failure  Patient is identified as having Combined Systolic and Diastolic heart failure that is Chronic. CHF is currently controlled. Latest ECHO performed and demonstrates- Results for orders placed during the hospital encounter of 12/22/22    Echo Saline Bubble? No    Interpretation Summary  · The left ventricle is severely enlarged with eccentric hypertrophy and severely decreased systolic function. The estimated ejection fraction is 10%.  · Normal right ventricular size with mildly reduced right ventricular systolic function.  · Grade II left ventricular diastolic dysfunction.  · Biatrial enlargement.  · The estimated PA systolic pressure is 51 mmHg.  · Normal central venous pressure (3 mmHg).  . Continue home meds    History of coronary artery stent placement  Resume home meds  tele     Automatic implantable cardioverter-defibrillator in situ  tele    Ischemic cardiomyopathy  Continue home meds  Tele  acurate I&O          Carola Buckley NP  Colorectal Surgery  [facility] - Telemetry Stepdown

## 2022-12-29 NOTE — PHYSICIAN QUERY
PT Name: Odette Hart  MR #: [id number]     CDS: Demetrice CASTAÑEDA,RN        Contact information:[email or web address]    This form is a permanent document in the medical record.     Query Date: December 29, 2022    By submitting this query, we are merely seeking further clarification of documentation. Please utilize your independent clinical judgment when addressing the question(s) below.    The Medical Record reflects the following:    Supporting Clinical Findings Location in Medical Record   67 year old woman with a history of chronic hypoxemic respiratory failure, MDD, debility, severe malnutrition, recent acute cholecystitis who underwent an open takedown of colovesical and colovaginal fistuli (two separate areas) with sigmoid colectomy, partial cecectomy and appendectomy, drainage of intra-abdominal abscess and end colostomy (with cholecystectomy by Dr. Epstein) on 5/9/2022, complicated by readmission for management of shortness of breath/HF exacerbation, severe malnutrition.  She is drinking Boosts at home and is much more active. She is developing more of an appetite - but not perfect. She is having only intermittent nausea, no vomiting. She gained 3 lbs.  Patient Active Problem List  Colocutaneous fistula  Severe protein-calorie malnutrition  Encounter for Palliative care  Abdominal wall cellulitis  Debility    Vitals:   WT: 118 lb 13.3 oz  BMI: 21.05                   12/22 Colon and Rectal Surgery  H&P                                     12/23/22 Vitals    Colocutaneous fistula  Hypertension  History of myocardial infarction  Normocytic anemia  Enc diet  Familial hypercholesterolemia  RBBB with left anterior fascicular block  Chronic congestive heart failure  Patient is identified as having Combined Systolic and Diastolic heart failure that is Chronic  History of coronary artery stent placement  Automatic implantable cardioverter-defibrillator in situ  Ischemic cardiomyopathy          12/29 Colon  and Rectal Surgery PN      Academy of Nutrition and Dietetics (Academy) and the American Society for Parenteral and Enteral Nutrition (A.S.P.E.N.) Clinical Characteristics to support Malnutrition   Malnutrition in the Context of Acute Illness or Injury Malnutrition in the Context of Chronic Illness or Injury Malnutrition in the Context of Social or Environmental Circumstances   Malnutrition Level Moderate Severe Moderate Severe   Moderate   Severe   Energy Intake <75%                   >7 days <50%                 >5 days <75%           >1 month <75%                      >1 month   <75% for >3 months   <50% for >1 month   Weight Loss   1-2% in 1 week >2% in 1 week 5% in 1 month >5% in 1 month 5% in 1 month >5% in 1 month    5% in 1 month >5% in 1 month 7.5% in 3 months >7.5% in 3 months 7.5% in 3 months >7.5% in 3 months    7.5% in 3 months >7.5% in 3 months 10% in 6 months >10% in 6 months 10% in 6 months >10% in 6 months        20% in 1 year                    >20% in 1 year                                                                  20% in 1 year                            >20% in 1 year                                                  Subcutaneous Fat Loss Mild  Moderate  Mild  Severe    Mild   Severe   Muscle Loss Mild  Moderate  Mild  Severe    Mild   Severe   Edema/Fluid Accumulation Mild Moderate to severe  Mild  Severe   Mild   Severe   Reduced  Strength         (based on standards supplied by  of dynamometer) N/A Measurably reduced N/A Measurably reduced N/A Measurably reduced     Criteria for mild malnutrition is defined as 1 characteristic outlined above within the established moderate or severe parameters.  A minimum of 2 out of the 6 characteristics noted above are recommended for a diagnosis of moderate or severe malnutrition.  Chronic illness/injury is a disease/condition lasting 3 months or longer.    The noted clinical guidelines are only system guidelines and do not replace  "the provider's clinical judgment.                                                                         Provider, please clarify the above documentation regarding " severe protein calorie malnutrition".    [ X  ] Severe protein calorie malnutrition is present and pertinent during this admission   [   ] Severe protein calorie malnutrition is past history only and is not a current condition during this admission   [   ] Severe protein calorie malnutrition is not  a pertinent diagnosis during this admission   [   ] Other (please specify): ____________                                                                                                                       "

## 2022-12-29 NOTE — PROGRESS NOTES
Follow up performed for appliance placement.  Patient removed appliance and replaced with a new appliance with no complications.  She used adhesive remover wipe, towel with warm water, skin barrier wipe, and changed old ostomy gauze and tape.  Comfortable with placement.  Any questions answered.  Will give supplies for home use until she is able to order Convatec [id number].  Will recheck daily as long as she is admitted.

## 2022-12-29 NOTE — SUBJECTIVE & OBJECTIVE
Subjective:     Interval History: no acute events overnite, wound appliance not leaking overnite, eating     Post-Op Info:  * No surgery found *          Medications:  Continuous Infusions:  Scheduled Meds:   aspirin  81 mg Oral Daily    clopidogreL  75 mg Oral Daily    ezetimibe  10 mg Oral QHS    famotidine  20 mg Oral Daily    fluticasone propionate  1 spray Each Nostril Daily    furosemide  40 mg Oral Daily    losartan  25 mg Oral Daily    metoprolol succinate  25 mg Oral Daily    psyllium husk (aspartame)  1 packet Oral Daily     PRN Meds:   albuterol    ALPRAZolam    dextrose 10%    dextrose 10%    glucagon (human recombinant)    glucose    glucose    HYDROmorphone    insulin aspart U-100    naloxone    prochlorperazine    sodium chloride 0.9%    traMADoL        Objective:     Vital Signs (Most Recent):  Temp: 97.8 °F (36.6 °C) (12/29/22 1117)  Pulse: 80 (12/29/22 1117)  Resp: 17 (12/29/22 1117)  BP: (!) 107/56 (12/29/22 1117)  SpO2: 98 % (12/29/22 1117)   Vital Signs (24h Range):  Temp:  [97.7 °F (36.5 °C)-98 °F (36.7 °C)] 97.8 °F (36.6 °C)  Pulse:  [51-85] 80  Resp:  [17-20] 17  SpO2:  [95 %-99 %] 98 %  BP: ()/(45-56) 107/56     Intake/Output - Last 3 Shifts         12/27 0700  12/28 0659 12/28 0700  12/29 0659 12/29 0700  12/30 0659    P.O. 400 250     Total Intake 400 250     Net +400 +250            Urine Occurrence 7 x 9 x     Stool Occurrence 0 x 0 x             Physical Exam  Vitals and nursing note reviewed.   Constitutional:       Appearance: She is well-developed.   HENT:      Head: Normocephalic.   Eyes:      Pupils: Pupils are equal, round, and reactive to light.   Cardiovascular:      Rate and Rhythm: Normal rate and regular rhythm.      Heart sounds: Normal heart sounds.   Pulmonary:      Effort: Pulmonary effort is normal. No respiratory distress.      Breath sounds: Normal breath sounds. No wheezing or rales.   Abdominal:      Palpations: Abdomen is soft. There is no mass.       Tenderness: There is no guarding or rebound.      Comments: ECF appliance not leaking   Skin:     General: Skin is warm and dry.   Neurological:      Mental Status: She is alert and oriented to person, place, and time.       Significant Labs:  BMP (Last 3 Results):   Recent Labs   Lab 12/22/22  1332   *   *   K 4.1   CL 96   CO2 30*   BUN 21   CREATININE 0.8   CALCIUM 8.8     CBC (Last 3 Results):   Recent Labs   Lab 12/22/22  1333   WBC 3.02*   RBC 4.44   HGB 11.2*   HCT 36.7*      MCV 83   MCH 25.2*   MCHC 30.5*       Significant Diagnostics:  None

## 2022-12-29 NOTE — PLAN OF CARE
12/29/22 1311   Post-Acute Status   Post-Acute Authorization Home Health   Home Health Status Referrals Sent     Referral faxed to [facility] (f) [phone].    Alejandra Crandall RN  Ext [phone]

## 2022-12-29 NOTE — ASSESSMENT & PLAN NOTE
Chronic, controlled.  Latest blood pressure and vitals reviewed-   Temp:  [97.7 °F (36.5 °C)-98 °F (36.7 °C)]   Pulse:  [51-85]   Resp:  [17-20]   BP: ()/(45-56)   SpO2:  [95 %-99 %] .   Home meds for hypertension were reviewed and noted below.   Hypertension Medications             furosemide (LASIX) 40 MG tablet Take 1 tablet (40 mg total) by mouth 2 (two) times daily.    losartan (COZAAR) 50 MG tablet Take 1 tablet (50 mg total) by mouth once daily.    metoprolol succinate (TOPROL-XL) 25 MG 24 hr tablet Take 1 tablet (25 mg total) by mouth once daily.    nitroGLYCERIN (NITROSTAT) 0.4 MG SL tablet Place 1 tablet (0.4 mg total) under the tongue every 5 (five) minutes as needed for Chest pain.    spironolactone (ALDACTONE) 25 MG tablet Take 1 tablet (25 mg total) by mouth once daily.          While in the hospital, will manage blood pressure as follows; Continue home antihypertensive regimen    Will utilize p.r.n. blood pressure medication only if patient's blood pressure greater than  180/110 and she develops symptoms such as worsening chest pain or shortness of breath.

## 2022-12-30 NOTE — PLAN OF CARE
[facility] - Telemetry Stepdown  Discharge Final Note    Primary Care Provider: Braxton Barragan MD    Expected Discharge Date: 12/30/2022      Future Appointments   Date Time Provider Department Center   1/12/2023  2:15 PM Braxton Barragan MD [facility]   1/24/2023  9:45 AM Bratxon Barragan MD [facility]   3/29/2023 11:45 AM Yohannes Cordova MD [id number]        Final Discharge Note (most recent)       Final Note - 12/30/22 1520          Final Note    Assessment Type Final Discharge Note     Anticipated Discharge Disposition Home-Health Care [facility]     What phone number can be called within the next 1-3 days to see how you are doing after discharge? [phone]     Hospital Resources/Appts/Education Provided Appointments scheduled and added to AVS        Post-Acute Status    Post-Acute Authorization Home Health     Home Health Status --   PHN to assign HH agency.                    Important Message from Medicare  Important Message from Medicare regarding Discharge Appeal Rights: Given to patient/caregiver, Explained to patient/caregiver, Signed/date by patient/caregiver     Date IMM was signed: 12/30/22  Time IMM was signed: 1048    Contact Info       Rafa Cardozo MD   Specialty: Colon and Rectal Surgery    [address] [facility] [address]   Phone: [phone]       Next Steps: Follow up            Alejandra Crandall RN  Ext [phone]

## 2022-12-30 NOTE — ASSESSMENT & PLAN NOTE
Appreciate help from wound care  Wound appliance changed today 12/30, no leakage   Pt okay for dc today

## 2022-12-30 NOTE — PROGRESS NOTES
Patient seen this AM.  Wound care questions answered.  Verbalized understanding.  Supplies given for home use.  Ready for discharge from wound care standpoint.

## 2022-12-30 NOTE — DISCHARGE INSTRUCTIONS
Notify your health care provider if you experience any of the following:  increased confusion or weakness, sign of infection ( redness, swelling, foul odor), temp 100.5, severe pain

## 2022-12-30 NOTE — PLAN OF CARE
CM informed that patient will need transportation to her home at [address] [place] [id number]. Ambulatory van transportation arranged for 12:30 PM.    15:30 PM  Ambulatory van transportation rescheduled for 16:30PM.    Alejandra Crandall RN  Ext [phone]

## 2022-12-30 NOTE — PLAN OF CARE
Call placed to [facility] ([phone], OPT:4) to check on the status of patient's HH arrangements. This CM informed that Ms. Hart has not been assigned a HH agency yet but she will contact this CM when a HH agency has been assigned. Will continue to follow.    Alejandra Crandall RN  Ext [phone]

## 2022-12-30 NOTE — DISCHARGE SUMMARY
[facility] - Telemetry Stepdown  Colorectal Surgery  Discharge Summary      Patient Name: Odette Hart  MRN: [id number]  Admission Date: 12/22/2022  Hospital Length of Stay: 8 days  Discharge Date and Time:   Attending Physician: Rafa Cardozo MD   Discharging Provider: Nadira Hendrickson NP  Primary Care Provider: Braxton Barragan MD     HPI:  No notes on file    * No surgery found *     Hospital Course:  Ms. 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 Epstein) on 5/9/2022, complicated by readmission for management of shortness of breath/HF exacerbation, severe malnutrition, and wound care.  She was admitted 12/22/22 for management of colocutaneous fistula. Care included nutrition optimization as plan will likely be surgery in the future. Wound care nurse assisted in care of patient.    TTE 12/23/22 The left ventricle is severely enlarged with eccentric hypertrophy and severely decreased systolic function. The estimated ejection fraction is 10%.   Normal right ventricular size with mildly reduced right ventricular systolic function.   Grade II left ventricular diastolic dysfunction.   Biatrial enlargement.   The estimated PA systolic pressure is 51 mmHg.   Normal central venous pressure (3 mmHg).    On day of discharge pt is rosette regular diet, ambulating in johnson without difficulty, adequate pain control with oral medication, VS stable and afebrile. Wound ostomy nurse visited pt on day of discharge and fully discussed teaching and answered all pertinent questions          Goals of Care Treatment Preferences:  Code Status:  Full Code          Significant Diagnostic Studies: Labs: BMP: No results for input(s): GLU, NA, K, CL, CO2, BUN, CREATININE, CALCIUM, MG in the last 48 hours., CMP No results for input(s): NA, K, CL, CO2, GLU, BUN, CREATININE, CALCIUM, PROT, ALBUMIN, BILITOT, ALKPHOS, AST, ALT, ANIONGAP, ESTGFRAFRICA, EGFRNONAA in the last 48 hours. and CBC No results for input(s): WBC, HGB, HCT, PLT in the last 48 hours.    Pending Diagnostic Studies:     None        Final Active Diagnoses:    Diagnosis Date Noted POA    PRINCIPAL PROBLEM:  Colocutaneous fistula [K63.2] 12/22/2022 Yes    Heart failure, systolic and diastolic, chronic [I50.42] 09/27/2022 Yes    Severe protein-calorie malnutrition [E43] 07/18/2022 Yes    Hypoalbuminemia [E88.09] 07/07/2022 Yes    Increased nutritional needs [R63.8] 05/31/2022 Yes    Recurrent major depressive disorder [F33.9] 05/19/2022 Yes    Hyponatremia [E87.1] 05/15/2022 Yes    Mood disorder [F39] 05/13/2022 Yes    COPD (chronic obstructive pulmonary disease) [J44.9] 03/23/2022 Yes    Leukopenia [D72.819] 02/26/2022 Yes    History of myocardial infarction [I25.2] 01/24/2022 Not Applicable    Hypertension [I10] 01/24/2022 Yes    Former smoker [Z87.891] 01/24/2022 Not Applicable    Normocytic anemia [D64.9] 01/23/2022 Yes    Familial hypercholesterolemia [E78.01] 01/22/2022 Yes    Coronary artery disease [I25.10] 04/19/2021 Yes    History of diverticulitis [Z87.19] 01/12/2021 Not Applicable    RBBB with left anterior fascicular block [I45.2] 04/15/2019 Yes    Chronic congestive heart failure [I50.9] 03/07/2019 Yes    Ischemic cardiomyopathy [I25.5] 02/04/2019 Yes    Automatic implantable cardioverter-defibrillator in situ [Z95.810] 02/04/2019 Yes    History of coronary artery stent placement [Z95.5] 02/04/2019 Not Applicable      Problems Resolved During this Admission:      Discharged Condition: good    Disposition: Home or Self Care    Follow Up:   Follow-up Information      Rafa Cardozo MD Follow up.    Specialty: Colon and Rectal Surgery  Contact information:  [address]  [facility] [address]  [phone]                       Patient Instructions:      Diet diabetic     Lifting restrictions   Order Comments: Do not lift over 5 pounds for 6 weeks     Other restrictions (specify):   Order Comments: Showers okay     Notify your health care provider if you experience any of the following:  temperature >100.4     Notify your health care provider if you experience any of the following:  persistent nausea and vomiting or diarrhea     Notify your health care provider if you experience any of the following:  severe uncontrolled pain     Notify your health care provider if you experience any of the following:  redness, tenderness, or signs of infection (pain, swelling, redness, odor or green/yellow discharge around incision site)     Notify your health care provider if you experience any of the following:  difficulty breathing or increased cough     Notify your health care provider if you experience any of the following:  severe persistent headache     Notify your health care provider if you experience any of the following:  worsening rash     Notify your health care provider if you experience any of the following:  persistent dizziness, light-headedness, or visual disturbances     Notify your health care provider if you experience any of the following:  increased confusion or weakness     Notify your health care provider if you experience any of the following:     Medications:  Reconciled Home Medications:      Medication List      START taking these medications    psyllium husk (aspartame) 3.4 gram Pwpk packet  Commonly known as: METAMUCIL  Take 1 packet by mouth once daily.        CHANGE how you take these medications    furosemide 40 MG tablet  Commonly known as: LASIX  Take 1 tablet (40 mg total) by mouth once daily.  Start taking on: December 31, 2022  What changed: when to  take this     losartan 25 MG tablet  Commonly known as: COZAAR  Take 1 tablet (25 mg total) by mouth once daily.  Start taking on: December 31, 2022  What changed:   · medication strength  · how much to take     traMADoL 50 mg tablet  Commonly known as: ULTRAM  Take 1 tablet (50 mg total) by mouth every 4 (four) hours as needed for Pain.  What changed: when to take this        CONTINUE taking these medications    acetaminophen 500 MG tablet  Commonly known as: TYLENOL  Take 2 tablets (1,000 mg total) by mouth every 8 (eight) hours as needed for Pain.     albuterol 90 mcg/actuation inhaler  Commonly known as: PROVENTIL/VENTOLIN HFA  Inhale 2 puffs into the lungs every 6 (six) hours as needed for Wheezing. Rescue     ALPRAZolam 0.5 MG tablet  Commonly known as: XANAX  Take 1 tablet (0.5 mg total) by mouth nightly as needed for Anxiety.     aspirin 81 MG EC tablet  Commonly known as: ECOTRIN  Take 1 tablet (81 mg total) by mouth once daily.     clopidogreL 75 mg tablet  Commonly known as: PLAVIX  Take 1 tablet (75 mg total) by mouth once daily.     collagenase ointment  Commonly known as: SANTYL  Apply topically once daily.     docusate sodium 100 MG capsule  Commonly known as: COLACE  Take 1 capsule (100 mg total) by mouth 2 (two) times daily.     empagliflozin 10 mg tablet  Commonly known as: JARDIANCE  Take 1 tablet (10 mg total) by mouth once daily.     ENSURE HIGH PROTEIN Liqd  Generic drug: food supplemt, lactose-reduced  Take 240 mLs by mouth 2 (two) times a day.     ezetimibe 10 mg tablet  Commonly known as: ZETIA  Take 1 tablet (10 mg total) by mouth every evening.     famotidine 20 MG tablet  Commonly known as: PEPCID  Take 1 tablet (20 mg total) by mouth once daily.     fluticasone propionate 50 mcg/actuation nasal spray  Commonly known as: FLONASE  1 spray (50 mcg total) by Each Nostril route once daily.     metoprolol succinate 25 MG 24 hr tablet  Commonly known as: TOPROL-XL  Take 1 tablet (25 mg total)  by mouth once daily.     nitroGLYCERIN 0.4 MG SL tablet  Commonly known as: NITROSTAT  Place 1 tablet (0.4 mg total) under the tongue every 5 (five) minutes as needed for Chest pain.     ondansetron 8 MG Tbdl  Commonly known as: ZOFRAN-ODT  Take 1 tablet (8 mg total) by mouth every 12 (twelve) hours as needed (nausea).     promethazine 25 MG tablet  Commonly known as: PHENERGAN  Take 1 tablet (25 mg total) by mouth every 6 (six) hours as needed for Nausea.     simethicone 80 MG chewable tablet  Commonly known as: MYLICON  Take 1 tablet (80 mg total) by mouth every 8 (eight) hours as needed for Flatulence.     spironolactone 25 MG tablet  Commonly known as: ALDACTONE  Take 1 tablet (25 mg total) by mouth once daily.        STOP taking these medications    polyethylene glycol 17 gram/dose powder  Commonly known as: GLYCOLAX            Nadira Hendrickson NP  Colorectal Surgery  [facility] - Telemetry Stepdown

## 2022-12-30 NOTE — HOSPITAL COURSE
Ms. 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 Epstein) on 5/9/2022, complicated by readmission for management of shortness of breath/HF exacerbation, severe malnutrition, and wound care.  She was admitted 12/22/22 for management of colocutaneous fistula. Care included nutrition optimization as plan will likely be surgery in the future. Wound care nurse assisted in care of patient.   TTE 12/23/22 The left ventricle is severely enlarged with eccentric hypertrophy and severely decreased systolic function. The estimated ejection fraction is 10%.  Normal right ventricular size with mildly reduced right ventricular systolic function.  Grade II left ventricular diastolic dysfunction.  Biatrial enlargement.  The estimated PA systolic pressure is 51 mmHg.  Normal central venous pressure (3 mmHg).    On day of discharge pt is rosette regular diet, ambulating in johnson without difficulty, adequate pain control with oral medication, VS stable and afebrile. Wound ostomy nurse visited pt on day of discharge and fully discussed teaching and answered all pertinent questions

## 2022-12-31 NOTE — PLAN OF CARE
Problem: Adult Inpatient Plan of Care  Goal: Optimal Comfort and Wellbeing  Outcome: Ongoing, Progressing     Problem: Adult Inpatient Plan of Care  Goal: Readiness for Transition of Care  Outcome: Met

## 2023-01-01 ENCOUNTER — TELEPHONE (OUTPATIENT)
Dept: SURGERY | Facility: CLINIC | Age: 68
End: 2023-01-01
Payer: MEDICARE

## 2023-01-01 ENCOUNTER — TELEPHONE (OUTPATIENT)
Dept: CARDIOLOGY | Facility: CLINIC | Age: 68
End: 2023-01-01
Payer: MEDICARE

## 2023-01-01 ENCOUNTER — TELEPHONE (OUTPATIENT)
Dept: ADMINISTRATIVE | Facility: CLINIC | Age: 68
End: 2023-01-01
Payer: MEDICARE

## 2023-01-01 ENCOUNTER — TELEPHONE (OUTPATIENT)
Dept: PRIMARY CARE CLINIC | Facility: CLINIC | Age: 68
End: 2023-01-01

## 2023-01-01 ENCOUNTER — OFFICE VISIT (OUTPATIENT)
Dept: SURGERY | Facility: CLINIC | Age: 68
End: 2023-01-01
Payer: MEDICARE

## 2023-01-01 ENCOUNTER — HOSPITAL ENCOUNTER (INPATIENT)
Facility: OTHER | Age: 68
LOS: 1 days | DRG: 291 | End: 2023-05-12
Attending: EMERGENCY MEDICINE | Admitting: INTERNAL MEDICINE
Payer: MEDICARE

## 2023-01-01 ENCOUNTER — OFFICE VISIT (OUTPATIENT)
Dept: PRIMARY CARE CLINIC | Facility: CLINIC | Age: 68
End: 2023-01-01
Payer: MEDICARE

## 2023-01-01 ENCOUNTER — PES CALL (OUTPATIENT)
Dept: ADMINISTRATIVE | Facility: CLINIC | Age: 68
End: 2023-01-01
Payer: MEDICARE

## 2023-01-01 ENCOUNTER — OFFICE VISIT (OUTPATIENT)
Dept: PLASTIC SURGERY | Facility: CLINIC | Age: 68
End: 2023-01-01
Payer: MEDICARE

## 2023-01-01 ENCOUNTER — TELEPHONE (OUTPATIENT)
Dept: PRIMARY CARE CLINIC | Facility: CLINIC | Age: 68
End: 2023-01-01
Payer: MEDICARE

## 2023-01-01 ENCOUNTER — OFFICE VISIT (OUTPATIENT)
Dept: WOUND CARE | Facility: CLINIC | Age: 68
End: 2023-01-01
Payer: MEDICARE

## 2023-01-01 ENCOUNTER — TELEPHONE (OUTPATIENT)
Dept: UROLOGY | Facility: CLINIC | Age: 68
End: 2023-01-01
Payer: MEDICARE

## 2023-01-01 ENCOUNTER — HOSPITAL ENCOUNTER (INPATIENT)
Facility: HOSPITAL | Age: 68
LOS: 34 days | Discharge: NURSING, REHAB OR LONG-TERM CARE | DRG: 329 | End: 2023-03-06
Attending: STUDENT IN AN ORGANIZED HEALTH CARE EDUCATION/TRAINING PROGRAM | Admitting: STUDENT IN AN ORGANIZED HEALTH CARE EDUCATION/TRAINING PROGRAM
Payer: MEDICARE

## 2023-01-01 ENCOUNTER — OFFICE VISIT (OUTPATIENT)
Dept: CARDIOLOGY | Facility: CLINIC | Age: 68
End: 2023-01-01
Attending: INTERNAL MEDICINE
Payer: MEDICARE

## 2023-01-01 ENCOUNTER — ANESTHESIA EVENT (OUTPATIENT)
Dept: SURGERY | Facility: HOSPITAL | Age: 68
DRG: 329 | End: 2023-01-01
Payer: MEDICARE

## 2023-01-01 ENCOUNTER — PATIENT MESSAGE (OUTPATIENT)
Dept: ADMINISTRATIVE | Facility: CLINIC | Age: 68
End: 2023-01-01
Payer: MEDICARE

## 2023-01-01 ENCOUNTER — TELEPHONE (OUTPATIENT)
Dept: PLASTIC SURGERY | Facility: CLINIC | Age: 68
End: 2023-01-01

## 2023-01-01 ENCOUNTER — TELEPHONE (OUTPATIENT)
Dept: UROLOGY | Facility: CLINIC | Age: 68
End: 2023-01-01

## 2023-01-01 ENCOUNTER — HOSPITAL ENCOUNTER (OUTPATIENT)
Dept: PREADMISSION TESTING | Facility: HOSPITAL | Age: 68
Discharge: HOME | End: 2023-01-24
Attending: STUDENT IN AN ORGANIZED HEALTH CARE EDUCATION/TRAINING PROGRAM
Payer: MEDICARE

## 2023-01-01 ENCOUNTER — NURSE TRIAGE (OUTPATIENT)
Dept: ADMINISTRATIVE | Facility: CLINIC | Age: 68
End: 2023-01-01
Payer: MEDICARE

## 2023-01-01 ENCOUNTER — DOCUMENT SCAN (OUTPATIENT)
Dept: HOME HEALTH SERVICES | Facility: HOSPITAL | Age: 68
End: 2023-01-01
Payer: MEDICARE

## 2023-01-01 ENCOUNTER — TELEPHONE (OUTPATIENT)
Dept: PLASTIC SURGERY | Facility: CLINIC | Age: 68
End: 2023-01-01
Payer: MEDICARE

## 2023-01-01 ENCOUNTER — TELEPHONE (OUTPATIENT)
Dept: PREADMISSION TESTING | Facility: HOSPITAL | Age: 68
End: 2023-01-01
Payer: MEDICARE

## 2023-01-01 ENCOUNTER — PATIENT MESSAGE (OUTPATIENT)
Dept: CARDIOLOGY | Facility: CLINIC | Age: 68
End: 2023-01-01
Payer: MEDICARE

## 2023-01-01 ENCOUNTER — ANESTHESIA (OUTPATIENT)
Dept: SURGERY | Facility: HOSPITAL | Age: 68
DRG: 329 | End: 2023-01-01
Payer: MEDICARE

## 2023-01-01 ENCOUNTER — EXTERNAL HOME HEALTH (OUTPATIENT)
Dept: HOME HEALTH SERVICES | Facility: HOSPITAL | Age: 68
End: 2023-01-01
Payer: MEDICARE

## 2023-01-01 ENCOUNTER — PROCEDURE VISIT (OUTPATIENT)
Dept: UROLOGY | Facility: CLINIC | Age: 68
End: 2023-01-01
Payer: MEDICARE

## 2023-01-01 ENCOUNTER — CLINICAL SUPPORT (OUTPATIENT)
Dept: CARDIOLOGY | Facility: CLINIC | Age: 68
End: 2023-01-01
Payer: MEDICARE

## 2023-01-01 ENCOUNTER — OFFICE VISIT (OUTPATIENT)
Dept: UROLOGY | Facility: CLINIC | Age: 68
End: 2023-01-01
Payer: MEDICARE

## 2023-01-01 ENCOUNTER — LAB VISIT (OUTPATIENT)
Dept: LAB | Facility: HOSPITAL | Age: 68
End: 2023-01-01
Attending: STUDENT IN AN ORGANIZED HEALTH CARE EDUCATION/TRAINING PROGRAM
Payer: MEDICARE

## 2023-01-01 ENCOUNTER — DOCUMENTATION ONLY (OUTPATIENT)
Dept: CARDIOLOGY | Facility: HOSPITAL | Age: 68
End: 2023-01-01
Payer: MEDICARE

## 2023-01-01 VITALS
WEIGHT: 122.88 LBS | SYSTOLIC BLOOD PRESSURE: 108 MMHG | BODY MASS INDEX: 21.77 KG/M2 | TEMPERATURE: 98 F | DIASTOLIC BLOOD PRESSURE: 51 MMHG | OXYGEN SATURATION: 98 % | HEIGHT: 63 IN | BODY MASS INDEX: 22.02 KG/M2 | HEIGHT: 63 IN | DIASTOLIC BLOOD PRESSURE: 54 MMHG | WEIGHT: 124.25 LBS | HEART RATE: 85 BPM | SYSTOLIC BLOOD PRESSURE: 86 MMHG | OXYGEN SATURATION: 99 % | HEART RATE: 81 BPM

## 2023-01-01 VITALS
WEIGHT: 122.44 LBS | BODY MASS INDEX: 21.7 KG/M2 | DIASTOLIC BLOOD PRESSURE: 64 MMHG | OXYGEN SATURATION: 95 % | RESPIRATION RATE: 17 BRPM | HEART RATE: 81 BPM | SYSTOLIC BLOOD PRESSURE: 92 MMHG | TEMPERATURE: 98 F | HEIGHT: 63 IN

## 2023-01-01 VITALS
SYSTOLIC BLOOD PRESSURE: 112 MMHG | HEIGHT: 63 IN | HEIGHT: 63 IN | WEIGHT: 114.88 LBS | WEIGHT: 114.81 LBS | BODY MASS INDEX: 20.34 KG/M2 | DIASTOLIC BLOOD PRESSURE: 60 MMHG | BODY MASS INDEX: 20.36 KG/M2 | HEART RATE: 75 BPM

## 2023-01-01 VITALS
BODY MASS INDEX: 21.61 KG/M2 | OXYGEN SATURATION: 98 % | WEIGHT: 121.94 LBS | HEIGHT: 63 IN | DIASTOLIC BLOOD PRESSURE: 62 MMHG | SYSTOLIC BLOOD PRESSURE: 108 MMHG | RESPIRATION RATE: 16 BRPM | HEART RATE: 84 BPM

## 2023-01-01 VITALS
WEIGHT: 113 LBS | HEART RATE: 81 BPM | SYSTOLIC BLOOD PRESSURE: 90 MMHG | TEMPERATURE: 99 F | HEIGHT: 63 IN | OXYGEN SATURATION: 97 % | DIASTOLIC BLOOD PRESSURE: 49 MMHG | BODY MASS INDEX: 20.02 KG/M2 | RESPIRATION RATE: 20 BRPM

## 2023-01-01 VITALS
SYSTOLIC BLOOD PRESSURE: 95 MMHG | DIASTOLIC BLOOD PRESSURE: 54 MMHG | HEART RATE: 77 BPM | WEIGHT: 122 LBS | BODY MASS INDEX: 21.62 KG/M2 | HEIGHT: 63 IN

## 2023-01-01 VITALS
DIASTOLIC BLOOD PRESSURE: 59 MMHG | BODY MASS INDEX: 20.02 KG/M2 | WEIGHT: 113 LBS | HEART RATE: 81 BPM | SYSTOLIC BLOOD PRESSURE: 90 MMHG | HEIGHT: 63 IN

## 2023-01-01 VITALS
RESPIRATION RATE: 32 BRPM | DIASTOLIC BLOOD PRESSURE: 65 MMHG | BODY MASS INDEX: 21.21 KG/M2 | HEIGHT: 63 IN | TEMPERATURE: 99 F | WEIGHT: 119.69 LBS | SYSTOLIC BLOOD PRESSURE: 79 MMHG | OXYGEN SATURATION: 54 %

## 2023-01-01 VITALS
WEIGHT: 112.19 LBS | DIASTOLIC BLOOD PRESSURE: 51 MMHG | BODY MASS INDEX: 19.88 KG/M2 | TEMPERATURE: 98 F | SYSTOLIC BLOOD PRESSURE: 91 MMHG | HEART RATE: 73 BPM | RESPIRATION RATE: 18 BRPM

## 2023-01-01 DIAGNOSIS — G89.29 CHRONIC MIDLINE LOW BACK PAIN WITHOUT SCIATICA: ICD-10-CM

## 2023-01-01 DIAGNOSIS — R06.02 SOB (SHORTNESS OF BREATH): ICD-10-CM

## 2023-01-01 DIAGNOSIS — Z91.89 AT RISK FOR PROLONGED QT INTERVAL SYNDROME: ICD-10-CM

## 2023-01-01 DIAGNOSIS — I50.43 ACUTE ON CHRONIC COMBINED SYSTOLIC AND DIASTOLIC CONGESTIVE HEART FAILURE: Primary | ICD-10-CM

## 2023-01-01 DIAGNOSIS — R53.81 DEBILITY: ICD-10-CM

## 2023-01-01 DIAGNOSIS — N13.30 HYDRONEPHROSIS OF RIGHT KIDNEY: Primary | ICD-10-CM

## 2023-01-01 DIAGNOSIS — T81.30XD ABDOMINAL WOUND DEHISCENCE, SUBSEQUENT ENCOUNTER: Chronic | ICD-10-CM

## 2023-01-01 DIAGNOSIS — I47.20 VENTRICULAR TACHYCARDIA: ICD-10-CM

## 2023-01-01 DIAGNOSIS — I25.5 ISCHEMIC CARDIOMYOPATHY: ICD-10-CM

## 2023-01-01 DIAGNOSIS — R11.0 NAUSEA: ICD-10-CM

## 2023-01-01 DIAGNOSIS — R10.9 ABDOMINAL PAIN, UNSPECIFIED ABDOMINAL LOCATION: Primary | ICD-10-CM

## 2023-01-01 DIAGNOSIS — L02.211 ABDOMINAL WALL ABSCESS: Primary | ICD-10-CM

## 2023-01-01 DIAGNOSIS — J96.01 ACUTE RESPIRATORY FAILURE WITH HYPOXIA: ICD-10-CM

## 2023-01-01 DIAGNOSIS — F41.9 ANXIETY: ICD-10-CM

## 2023-01-01 DIAGNOSIS — K94.09 COLOSTOMY PROLAPSE: ICD-10-CM

## 2023-01-01 DIAGNOSIS — K63.2 ENTEROCUTANEOUS FISTULA: Primary | ICD-10-CM

## 2023-01-01 DIAGNOSIS — Z78.0 MENOPAUSE: ICD-10-CM

## 2023-01-01 DIAGNOSIS — Z51.5 ENCOUNTER FOR PALLIATIVE CARE: ICD-10-CM

## 2023-01-01 DIAGNOSIS — K94.09: Primary | ICD-10-CM

## 2023-01-01 DIAGNOSIS — Z43.3 ATTENTION TO COLOSTOMY: ICD-10-CM

## 2023-01-01 DIAGNOSIS — Z87.891 FORMER SMOKER: ICD-10-CM

## 2023-01-01 DIAGNOSIS — Z43.3 ATTENTION TO COLOSTOMY: Primary | ICD-10-CM

## 2023-01-01 DIAGNOSIS — R74.01 TRANSAMINITIS: ICD-10-CM

## 2023-01-01 DIAGNOSIS — L02.211 ABDOMINAL WALL ABSCESS: ICD-10-CM

## 2023-01-01 DIAGNOSIS — E87.1 HYPONATREMIA: ICD-10-CM

## 2023-01-01 DIAGNOSIS — E78.00 HYPERCHOLESTEROLEMIA: ICD-10-CM

## 2023-01-01 DIAGNOSIS — K63.2 COLOCUTANEOUS FISTULA: ICD-10-CM

## 2023-01-01 DIAGNOSIS — R10.9 POSTOPERATIVE ABDOMINAL PAIN: ICD-10-CM

## 2023-01-01 DIAGNOSIS — E43 SEVERE PROTEIN-CALORIE MALNUTRITION: ICD-10-CM

## 2023-01-01 DIAGNOSIS — I25.10 CORONARY ARTERY DISEASE INVOLVING NATIVE CORONARY ARTERY OF NATIVE HEART WITHOUT ANGINA PECTORIS: ICD-10-CM

## 2023-01-01 DIAGNOSIS — I45.2 RBBB WITH LEFT ANTERIOR FASCICULAR BLOCK: ICD-10-CM

## 2023-01-01 DIAGNOSIS — I50.42 CHRONIC COMBINED SYSTOLIC AND DIASTOLIC CONGESTIVE HEART FAILURE: ICD-10-CM

## 2023-01-01 DIAGNOSIS — Z95.810 AUTOMATIC IMPLANTABLE CARDIOVERTER-DEFIBRILLATOR IN SITU: Primary | ICD-10-CM

## 2023-01-01 DIAGNOSIS — R94.31 PROLONGED Q-T INTERVAL ON ECG: ICD-10-CM

## 2023-01-01 DIAGNOSIS — Z95.5 HISTORY OF CORONARY ARTERY STENT PLACEMENT: ICD-10-CM

## 2023-01-01 DIAGNOSIS — F32.A DEPRESSION, UNSPECIFIED DEPRESSION TYPE: ICD-10-CM

## 2023-01-01 DIAGNOSIS — M54.50 CHRONIC MIDLINE LOW BACK PAIN WITHOUT SCIATICA: ICD-10-CM

## 2023-01-01 DIAGNOSIS — F33.1 MODERATE EPISODE OF RECURRENT MAJOR DEPRESSIVE DISORDER: ICD-10-CM

## 2023-01-01 DIAGNOSIS — T14.8XXA NONHEALING NONSURGICAL WOUND: Primary | ICD-10-CM

## 2023-01-01 DIAGNOSIS — E87.20 METABOLIC ACIDOSIS: ICD-10-CM

## 2023-01-01 DIAGNOSIS — R11.2 NAUSEA AND VOMITING, UNSPECIFIED VOMITING TYPE: ICD-10-CM

## 2023-01-01 DIAGNOSIS — Z87.19 HISTORY OF DIVERTICULITIS: ICD-10-CM

## 2023-01-01 DIAGNOSIS — I10 PRIMARY HYPERTENSION: ICD-10-CM

## 2023-01-01 DIAGNOSIS — R07.9 CHEST PAIN: ICD-10-CM

## 2023-01-01 DIAGNOSIS — R31.0 GROSS HEMATURIA: ICD-10-CM

## 2023-01-01 DIAGNOSIS — I50.42 HEART FAILURE, SYSTOLIC AND DIASTOLIC, CHRONIC: ICD-10-CM

## 2023-01-01 DIAGNOSIS — T81.89XD DELAYED SURGICAL WOUND HEALING, SUBSEQUENT ENCOUNTER: ICD-10-CM

## 2023-01-01 DIAGNOSIS — G89.18 POSTOPERATIVE ABDOMINAL PAIN: ICD-10-CM

## 2023-01-01 DIAGNOSIS — D64.9 NORMOCYTIC ANEMIA: ICD-10-CM

## 2023-01-01 DIAGNOSIS — N17.9 AKI (ACUTE KIDNEY INJURY): ICD-10-CM

## 2023-01-01 DIAGNOSIS — R57.9 SHOCK: ICD-10-CM

## 2023-01-01 DIAGNOSIS — K63.2 ENTEROCUTANEOUS FISTULA: ICD-10-CM

## 2023-01-01 DIAGNOSIS — G93.41 ENCEPHALOPATHY, METABOLIC: ICD-10-CM

## 2023-01-01 DIAGNOSIS — N30.00 ACUTE CYSTITIS WITHOUT HEMATURIA: Primary | ICD-10-CM

## 2023-01-01 DIAGNOSIS — Z95.810 AUTOMATIC IMPLANTABLE CARDIOVERTER-DEFIBRILLATOR IN SITU: ICD-10-CM

## 2023-01-01 DIAGNOSIS — R94.31 PROLONGED QT INTERVAL: ICD-10-CM

## 2023-01-01 DIAGNOSIS — G47.00 INSOMNIA, UNSPECIFIED TYPE: ICD-10-CM

## 2023-01-01 DIAGNOSIS — R62.7 FTT (FAILURE TO THRIVE) IN ADULT: ICD-10-CM

## 2023-01-01 DIAGNOSIS — Z01.810 PRE-OPERATIVE CARDIOVASCULAR EXAMINATION: ICD-10-CM

## 2023-01-01 DIAGNOSIS — R94.31 T WAVE INVERSION IN EKG: ICD-10-CM

## 2023-01-01 DIAGNOSIS — Z01.818 PREOPERATIVE CLEARANCE: Primary | ICD-10-CM

## 2023-01-01 DIAGNOSIS — M25.519 SHOULDER PAIN, UNSPECIFIED CHRONICITY, UNSPECIFIED LATERALITY: ICD-10-CM

## 2023-01-01 DIAGNOSIS — T83.84XD PAIN DUE TO URETERAL STENT, SUBSEQUENT ENCOUNTER: ICD-10-CM

## 2023-01-01 DIAGNOSIS — L89.151 DECUBITUS ULCER OF SACRAL REGION, STAGE 1: ICD-10-CM

## 2023-01-01 DIAGNOSIS — I25.2 HISTORY OF MYOCARDIAL INFARCTION: ICD-10-CM

## 2023-01-01 DIAGNOSIS — Z93.3 COLOSTOMY IN PLACE: Primary | ICD-10-CM

## 2023-01-01 DIAGNOSIS — N13.30 HYDRONEPHROSIS OF RIGHT KIDNEY: ICD-10-CM

## 2023-01-01 LAB
ABO + RH BLD: NORMAL
ALBUMIN SERPL BCP-MCNC: 1.2 G/DL (ref 3.5–5.2)
ALBUMIN SERPL BCP-MCNC: 1.7 G/DL (ref 3.5–5.2)
ALBUMIN SERPL BCP-MCNC: 1.7 G/DL (ref 3.5–5.2)
ALBUMIN SERPL BCP-MCNC: 1.8 G/DL (ref 3.5–5.2)
ALBUMIN SERPL BCP-MCNC: 1.8 G/DL (ref 3.5–5.2)
ALBUMIN SERPL BCP-MCNC: 1.9 G/DL (ref 3.5–5.2)
ALBUMIN SERPL BCP-MCNC: 2 G/DL (ref 3.5–5.2)
ALBUMIN SERPL BCP-MCNC: 2.3 G/DL (ref 3.5–5.2)
ALLENS TEST: ABNORMAL
ALP SERPL-CCNC: 116 U/L (ref 55–135)
ALP SERPL-CCNC: 146 U/L (ref 55–135)
ALP SERPL-CCNC: 161 U/L (ref 55–135)
ALP SERPL-CCNC: 200 U/L (ref 55–135)
ALP SERPL-CCNC: 215 U/L (ref 55–135)
ALP SERPL-CCNC: 248 U/L (ref 55–135)
ALT SERPL W/O P-5'-P-CCNC: 143 U/L (ref 10–44)
ALT SERPL W/O P-5'-P-CCNC: 1973 U/L (ref 10–44)
ALT SERPL W/O P-5'-P-CCNC: 23 U/L (ref 10–44)
ALT SERPL W/O P-5'-P-CCNC: 368 U/L (ref 10–44)
ALT SERPL W/O P-5'-P-CCNC: 46 U/L (ref 10–44)
ALT SERPL W/O P-5'-P-CCNC: 4763 U/L (ref 10–44)
ANION GAP SERPL CALC-SCNC: 10 MMOL/L (ref 8–16)
ANION GAP SERPL CALC-SCNC: 10 MMOL/L (ref 8–16)
ANION GAP SERPL CALC-SCNC: 11 MMOL/L (ref 8–16)
ANION GAP SERPL CALC-SCNC: 12 MMOL/L (ref 8–16)
ANION GAP SERPL CALC-SCNC: 13 MMOL/L (ref 8–16)
ANION GAP SERPL CALC-SCNC: 14 MMOL/L (ref 8–16)
ANION GAP SERPL CALC-SCNC: 15 MMOL/L (ref 8–16)
ANION GAP SERPL CALC-SCNC: 15 MMOL/L (ref 8–16)
ANION GAP SERPL CALC-SCNC: 27 MMOL/L (ref 8–16)
ANION GAP SERPL CALC-SCNC: 3 MMOL/L (ref 8–16)
ANION GAP SERPL CALC-SCNC: 31 MMOL/L (ref 8–16)
ANION GAP SERPL CALC-SCNC: 32 MMOL/L (ref 8–16)
ANION GAP SERPL CALC-SCNC: 33 MMOL/L (ref 8–16)
ANION GAP SERPL CALC-SCNC: 5 MMOL/L (ref 8–16)
ANION GAP SERPL CALC-SCNC: 6 MMOL/L (ref 8–16)
ANION GAP SERPL CALC-SCNC: 7 MMOL/L (ref 8–16)
ANION GAP SERPL CALC-SCNC: 8 MMOL/L (ref 8–16)
ANION GAP SERPL CALC-SCNC: 9 MMOL/L (ref 8–16)
ANION GAP SERPL CALC-SCNC: ABNORMAL MMOL/L (ref 8–16)
ANISOCYTOSIS BLD QL SMEAR: SLIGHT
ANISOCYTOSIS BLD QL SMEAR: SLIGHT
AST SERPL-CCNC: 220 U/L (ref 10–40)
AST SERPL-CCNC: 36 U/L (ref 10–40)
AST SERPL-CCNC: 4934 U/L (ref 10–40)
AST SERPL-CCNC: 51 U/L (ref 10–40)
AST SERPL-CCNC: 734 U/L (ref 10–40)
AST SERPL-CCNC: ABNORMAL U/L (ref 10–40)
BACTERIA #/AREA URNS AUTO: ABNORMAL /HPF
BACTERIA UR CULT: NORMAL
BASOPHILS # BLD AUTO: 0.03 K/UL (ref 0–0.2)
BASOPHILS # BLD AUTO: 0.04 K/UL (ref 0–0.2)
BASOPHILS # BLD AUTO: 0.05 K/UL (ref 0–0.2)
BASOPHILS # BLD AUTO: 0.06 K/UL (ref 0–0.2)
BASOPHILS # BLD AUTO: ABNORMAL K/UL (ref 0–0.2)
BASOPHILS # BLD AUTO: ABNORMAL K/UL (ref 0–0.2)
BASOPHILS NFR BLD: 0 % (ref 0–1.9)
BASOPHILS NFR BLD: 0 % (ref 0–1.9)
BASOPHILS NFR BLD: 0.9 % (ref 0–1.9)
BASOPHILS NFR BLD: 1.1 % (ref 0–1.9)
BASOPHILS NFR BLD: 1.3 % (ref 0–1.9)
BASOPHILS NFR BLD: 1.6 % (ref 0–1.9)
BILIRUB DIRECT SERPL-MCNC: 1.5 MG/DL (ref 0.1–0.3)
BILIRUB SERPL-MCNC: 1.2 MG/DL (ref 0.1–1)
BILIRUB SERPL-MCNC: 1.4 MG/DL (ref 0.1–1)
BILIRUB SERPL-MCNC: 1.4 MG/DL (ref 0.1–1)
BILIRUB SERPL-MCNC: 2.1 MG/DL (ref 0.1–1)
BILIRUB SERPL-MCNC: 2.7 MG/DL (ref 0.1–1)
BILIRUB SERPL-MCNC: 2.7 MG/DL (ref 0.1–1)
BILIRUB UR QL STRIP: NEGATIVE
BLD GP AB SCN CELLS X3 SERPL QL: NORMAL
BLD PROD TYP BPU: NORMAL
BLD PROD TYP BPU: NORMAL
BLOOD UNIT EXPIRATION DATE: NORMAL
BLOOD UNIT EXPIRATION DATE: NORMAL
BLOOD UNIT TYPE CODE: 5100
BLOOD UNIT TYPE CODE: 5100
BLOOD UNIT TYPE: NORMAL
BLOOD UNIT TYPE: NORMAL
BNP SERPL-MCNC: 1438 PG/ML (ref 0–99)
BNP SERPL-MCNC: 1955 PG/ML (ref 0–99)
BNP SERPL-MCNC: 2604 PG/ML (ref 0–99)
BNP SERPL-MCNC: 4791 PG/ML (ref 0–99)
BNP SERPL-MCNC: 912 PG/ML (ref 0–99)
BUN SERPL-MCNC: 10 MG/DL (ref 8–23)
BUN SERPL-MCNC: 11 MG/DL (ref 8–23)
BUN SERPL-MCNC: 12 MG/DL (ref 8–23)
BUN SERPL-MCNC: 13 MG/DL (ref 8–23)
BUN SERPL-MCNC: 14 MG/DL (ref 8–23)
BUN SERPL-MCNC: 15 MG/DL (ref 8–23)
BUN SERPL-MCNC: 17 MG/DL (ref 8–23)
BUN SERPL-MCNC: 18 MG/DL (ref 8–23)
BUN SERPL-MCNC: 18 MG/DL (ref 8–23)
BUN SERPL-MCNC: 19 MG/DL (ref 8–23)
BUN SERPL-MCNC: 19 MG/DL (ref 8–23)
BUN SERPL-MCNC: 20 MG/DL (ref 8–23)
BUN SERPL-MCNC: 24 MG/DL (ref 8–23)
BUN SERPL-MCNC: 25 MG/DL (ref 8–23)
BUN SERPL-MCNC: 26 MG/DL (ref 8–23)
BUN SERPL-MCNC: 28 MG/DL (ref 8–23)
BUN SERPL-MCNC: 29 MG/DL (ref 8–23)
BUN SERPL-MCNC: 30 MG/DL (ref 8–23)
BUN SERPL-MCNC: 31 MG/DL (ref 8–23)
BUN SERPL-MCNC: 31 MG/DL (ref 8–23)
BUN SERPL-MCNC: 6 MG/DL (ref 8–23)
BUN SERPL-MCNC: 7 MG/DL (ref 8–23)
BUN SERPL-MCNC: 7 MG/DL (ref 8–23)
BUN SERPL-MCNC: 9 MG/DL (ref 8–23)
BUN SERPL-MCNC: 9 MG/DL (ref 8–23)
CA-I BLDV-SCNC: 1.09 MMOL/L (ref 1.06–1.42)
CALCIUM SERPL-MCNC: 6 MG/DL (ref 8.7–10.5)
CALCIUM SERPL-MCNC: 6.5 MG/DL (ref 8.7–10.5)
CALCIUM SERPL-MCNC: 6.8 MG/DL (ref 8.7–10.5)
CALCIUM SERPL-MCNC: 7.6 MG/DL (ref 8.7–10.5)
CALCIUM SERPL-MCNC: 7.9 MG/DL (ref 8.7–10.5)
CALCIUM SERPL-MCNC: 7.9 MG/DL (ref 8.7–10.5)
CALCIUM SERPL-MCNC: 8 MG/DL (ref 8.7–10.5)
CALCIUM SERPL-MCNC: 8 MG/DL (ref 8.7–10.5)
CALCIUM SERPL-MCNC: 8.1 MG/DL (ref 8.7–10.5)
CALCIUM SERPL-MCNC: 8.1 MG/DL (ref 8.7–10.5)
CALCIUM SERPL-MCNC: 8.2 MG/DL (ref 8.7–10.5)
CALCIUM SERPL-MCNC: 8.2 MG/DL (ref 8.7–10.5)
CALCIUM SERPL-MCNC: 8.3 MG/DL (ref 8.7–10.5)
CALCIUM SERPL-MCNC: 8.4 MG/DL (ref 8.7–10.5)
CALCIUM SERPL-MCNC: 8.5 MG/DL (ref 8.7–10.5)
CALCIUM SERPL-MCNC: 8.6 MG/DL (ref 8.7–10.5)
CALCIUM SERPL-MCNC: 8.7 MG/DL (ref 8.7–10.5)
CALCIUM SERPL-MCNC: 8.8 MG/DL (ref 8.7–10.5)
CALCIUM SERPL-MCNC: 8.9 MG/DL (ref 8.7–10.5)
CALCIUM SERPL-MCNC: 9 MG/DL (ref 8.7–10.5)
CALCIUM SERPL-MCNC: 9.2 MG/DL (ref 8.7–10.5)
CHLORIDE SERPL-SCNC: 81 MMOL/L (ref 95–110)
CHLORIDE SERPL-SCNC: 82 MMOL/L (ref 95–110)
CHLORIDE SERPL-SCNC: 82 MMOL/L (ref 95–110)
CHLORIDE SERPL-SCNC: 83 MMOL/L (ref 95–110)
CHLORIDE SERPL-SCNC: 84 MMOL/L (ref 95–110)
CHLORIDE SERPL-SCNC: 85 MMOL/L (ref 95–110)
CHLORIDE SERPL-SCNC: 86 MMOL/L (ref 95–110)
CHLORIDE SERPL-SCNC: 86 MMOL/L (ref 95–110)
CHLORIDE SERPL-SCNC: 87 MMOL/L (ref 95–110)
CHLORIDE SERPL-SCNC: 87 MMOL/L (ref 95–110)
CHLORIDE SERPL-SCNC: 88 MMOL/L (ref 95–110)
CHLORIDE SERPL-SCNC: 89 MMOL/L (ref 95–110)
CHLORIDE SERPL-SCNC: 90 MMOL/L (ref 95–110)
CHLORIDE SERPL-SCNC: 91 MMOL/L (ref 95–110)
CHLORIDE SERPL-SCNC: 92 MMOL/L (ref 95–110)
CHLORIDE SERPL-SCNC: 93 MMOL/L (ref 95–110)
CHLORIDE SERPL-SCNC: 94 MMOL/L (ref 95–110)
CHLORIDE SERPL-SCNC: 94 MMOL/L (ref 95–110)
CHLORIDE SERPL-SCNC: 95 MMOL/L (ref 95–110)
CHLORIDE SERPL-SCNC: 95 MMOL/L (ref 95–110)
CHLORIDE SERPL-SCNC: 96 MMOL/L (ref 95–110)
CHLORIDE SERPL-SCNC: 97 MMOL/L (ref 95–110)
CHLORIDE SERPL-SCNC: 97 MMOL/L (ref 95–110)
CHLORIDE SERPL-SCNC: 99 MMOL/L (ref 95–110)
CHLORIDE UR-SCNC: 68 MMOL/L (ref 25–200)
CHLORIDE UR-SCNC: <20 MMOL/L (ref 25–200)
CLARITY UR REFRACT.AUTO: ABNORMAL
CO2 SERPL-SCNC: 20 MMOL/L (ref 23–29)
CO2 SERPL-SCNC: 23 MMOL/L (ref 23–29)
CO2 SERPL-SCNC: 24 MMOL/L (ref 23–29)
CO2 SERPL-SCNC: 25 MMOL/L (ref 23–29)
CO2 SERPL-SCNC: 26 MMOL/L (ref 23–29)
CO2 SERPL-SCNC: 27 MMOL/L (ref 23–29)
CO2 SERPL-SCNC: 28 MMOL/L (ref 23–29)
CO2 SERPL-SCNC: 29 MMOL/L (ref 23–29)
CO2 SERPL-SCNC: 30 MMOL/L (ref 23–29)
CO2 SERPL-SCNC: 31 MMOL/L (ref 23–29)
CO2 SERPL-SCNC: 32 MMOL/L (ref 23–29)
CO2 SERPL-SCNC: 33 MMOL/L (ref 23–29)
CO2 SERPL-SCNC: 34 MMOL/L (ref 23–29)
CO2 SERPL-SCNC: 35 MMOL/L (ref 23–29)
CO2 SERPL-SCNC: 6 MMOL/L (ref 23–29)
CO2 SERPL-SCNC: 7 MMOL/L (ref 23–29)
CO2 SERPL-SCNC: <5 MMOL/L (ref 23–29)
CODING SYSTEM: NORMAL
CODING SYSTEM: NORMAL
COLOR UR AUTO: ABNORMAL
CREAT SERPL-MCNC: 0.5 MG/DL (ref 0.5–1.4)
CREAT SERPL-MCNC: 0.5 MG/DL (ref 0.5–1.4)
CREAT SERPL-MCNC: 0.6 MG/DL (ref 0.5–1.4)
CREAT SERPL-MCNC: 0.7 MG/DL (ref 0.5–1.4)
CREAT SERPL-MCNC: 0.8 MG/DL (ref 0.5–1.4)
CREAT SERPL-MCNC: 0.8 MG/DL (ref 0.5–1.4)
CREAT SERPL-MCNC: 0.9 MG/DL (ref 0.5–1.4)
CREAT SERPL-MCNC: 1 MG/DL (ref 0.5–1.4)
CREAT SERPL-MCNC: 1.1 MG/DL (ref 0.5–1.4)
CREAT SERPL-MCNC: 1.2 MG/DL (ref 0.5–1.4)
CREAT SERPL-MCNC: 1.3 MG/DL (ref 0.5–1.4)
CREAT SERPL-MCNC: 1.8 MG/DL (ref 0.5–1.4)
CREAT SERPL-MCNC: 2 MG/DL (ref 0.5–1.4)
CREAT SERPL-MCNC: 2.4 MG/DL (ref 0.5–1.4)
CREAT SERPL-MCNC: 2.5 MG/DL (ref 0.5–1.4)
CREAT SERPL-MCNC: 2.7 MG/DL (ref 0.5–1.4)
CREAT UR-MCNC: 6 MG/DL (ref 15–325)
CREAT UR-MCNC: 99 MG/DL (ref 15–325)
CROSSMATCH INTERPRETATION: NORMAL
CROSSMATCH INTERPRETATION: NORMAL
CRP SERPL-MCNC: 121.8 MG/L (ref 0–8.2)
CRP SERPL-MCNC: 129.8 MG/L (ref 0–8.2)
CRP SERPL-MCNC: 24.2 MG/L (ref 0–8.2)
CRP SERPL-MCNC: 26.3 MG/L (ref 0–8.2)
CRP SERPL-MCNC: 28.5 MG/L (ref 0–8.2)
CRP SERPL-MCNC: 36.85 MG/L (ref 0–3.19)
CRP SERPL-MCNC: 43.4 MG/L (ref 0–8.2)
CRP SERPL-MCNC: 50.5 MG/L (ref 0–8.2)
CRP SERPL-MCNC: 53.2 MG/L (ref 0–8.2)
CRP SERPL-MCNC: 57.1 MG/L (ref 0–8.2)
CRP SERPL-MCNC: 58.6 MG/L (ref 0–8.2)
CRP SERPL-MCNC: 58.7 MG/L (ref 0–8.2)
CRP SERPL-MCNC: 59.5 MG/L (ref 0–8.2)
CRP SERPL-MCNC: 62.4 MG/L (ref 0–8.2)
CRP SERPL-MCNC: 70.4 MG/L (ref 0–8.2)
CRP SERPL-MCNC: 86.2 MG/L (ref 0–8.2)
DELSYS: ABNORMAL
DIFFERENTIAL METHOD: ABNORMAL
DISPENSE STATUS: NORMAL
DISPENSE STATUS: NORMAL
EOSINOPHIL # BLD AUTO: 0 K/UL (ref 0–0.5)
EOSINOPHIL # BLD AUTO: ABNORMAL K/UL (ref 0–0.5)
EOSINOPHIL # BLD AUTO: ABNORMAL K/UL (ref 0–0.5)
EOSINOPHIL NFR BLD: 0 % (ref 0–8)
EOSINOPHIL NFR BLD: 0.2 % (ref 0–8)
ERYTHROCYTE [DISTWIDTH] IN BLOOD BY AUTOMATED COUNT: 20.6 % (ref 11.5–14.5)
ERYTHROCYTE [DISTWIDTH] IN BLOOD BY AUTOMATED COUNT: 20.6 % (ref 11.5–14.5)
ERYTHROCYTE [DISTWIDTH] IN BLOOD BY AUTOMATED COUNT: 20.7 % (ref 11.5–14.5)
ERYTHROCYTE [DISTWIDTH] IN BLOOD BY AUTOMATED COUNT: 20.7 % (ref 11.5–14.5)
ERYTHROCYTE [DISTWIDTH] IN BLOOD BY AUTOMATED COUNT: 20.9 % (ref 11.5–14.5)
ERYTHROCYTE [DISTWIDTH] IN BLOOD BY AUTOMATED COUNT: 21 % (ref 11.5–14.5)
ERYTHROCYTE [DISTWIDTH] IN BLOOD BY AUTOMATED COUNT: 21.7 % (ref 11.5–14.5)
ERYTHROCYTE [DISTWIDTH] IN BLOOD BY AUTOMATED COUNT: 21.7 % (ref 11.5–14.5)
ERYTHROCYTE [DISTWIDTH] IN BLOOD BY AUTOMATED COUNT: 21.9 % (ref 11.5–14.5)
ERYTHROCYTE [DISTWIDTH] IN BLOOD BY AUTOMATED COUNT: 22.4 % (ref 11.5–14.5)
ERYTHROCYTE [DISTWIDTH] IN BLOOD BY AUTOMATED COUNT: 22.8 % (ref 11.5–14.5)
ERYTHROCYTE [DISTWIDTH] IN BLOOD BY AUTOMATED COUNT: 23.1 % (ref 11.5–14.5)
ERYTHROCYTE [DISTWIDTH] IN BLOOD BY AUTOMATED COUNT: 23.3 % (ref 11.5–14.5)
ERYTHROCYTE [DISTWIDTH] IN BLOOD BY AUTOMATED COUNT: 23.4 % (ref 11.5–14.5)
ERYTHROCYTE [DISTWIDTH] IN BLOOD BY AUTOMATED COUNT: 23.7 % (ref 11.5–14.5)
ERYTHROCYTE [SEDIMENTATION RATE] IN BLOOD BY WESTERGREN METHOD: 28 MM/H
EST. GFR  (NO RACE VARIABLE): 19 ML/MIN/1.73 M^2
EST. GFR  (NO RACE VARIABLE): 21 ML/MIN/1.73 M^2
EST. GFR  (NO RACE VARIABLE): 22 ML/MIN/1.73 M^2
EST. GFR  (NO RACE VARIABLE): 27 ML/MIN/1.73 M^2
EST. GFR  (NO RACE VARIABLE): 31 ML/MIN/1.73 M^2
EST. GFR  (NO RACE VARIABLE): 45 ML/MIN/1.73 M^2
EST. GFR  (NO RACE VARIABLE): 50 ML/MIN/1.73 M^2
EST. GFR  (NO RACE VARIABLE): 55 ML/MIN/1.73 M^2
EST. GFR  (NO RACE VARIABLE): >60 ML/MIN/1.73 M^2
ETCO2: 28
FERRITIN SERPL-MCNC: 320 NG/ML (ref 20–300)
FINAL PATHOLOGIC DIAGNOSIS: NORMAL
FIO2: 80
GLUCOSE SERPL-MCNC: 100 MG/DL (ref 70–110)
GLUCOSE SERPL-MCNC: 101 MG/DL (ref 70–110)
GLUCOSE SERPL-MCNC: 101 MG/DL (ref 70–110)
GLUCOSE SERPL-MCNC: 102 MG/DL (ref 70–110)
GLUCOSE SERPL-MCNC: 103 MG/DL (ref 70–110)
GLUCOSE SERPL-MCNC: 103 MG/DL (ref 70–110)
GLUCOSE SERPL-MCNC: 104 MG/DL (ref 70–110)
GLUCOSE SERPL-MCNC: 109 MG/DL (ref 70–110)
GLUCOSE SERPL-MCNC: 112 MG/DL (ref 70–110)
GLUCOSE SERPL-MCNC: 115 MG/DL (ref 70–110)
GLUCOSE SERPL-MCNC: 121 MG/DL (ref 70–110)
GLUCOSE SERPL-MCNC: 123 MG/DL (ref 70–110)
GLUCOSE SERPL-MCNC: 123 MG/DL (ref 70–110)
GLUCOSE SERPL-MCNC: 129 MG/DL (ref 70–110)
GLUCOSE SERPL-MCNC: 130 MG/DL (ref 70–110)
GLUCOSE SERPL-MCNC: 131 MG/DL (ref 70–110)
GLUCOSE SERPL-MCNC: 132 MG/DL (ref 70–110)
GLUCOSE SERPL-MCNC: 134 MG/DL (ref 70–110)
GLUCOSE SERPL-MCNC: 134 MG/DL (ref 70–110)
GLUCOSE SERPL-MCNC: 137 MG/DL (ref 70–110)
GLUCOSE SERPL-MCNC: 141 MG/DL (ref 70–110)
GLUCOSE SERPL-MCNC: 144 MG/DL (ref 70–110)
GLUCOSE SERPL-MCNC: 158 MG/DL (ref 70–110)
GLUCOSE SERPL-MCNC: 179 MG/DL (ref 70–110)
GLUCOSE SERPL-MCNC: 206 MG/DL (ref 70–110)
GLUCOSE SERPL-MCNC: 211 MG/DL (ref 70–110)
GLUCOSE SERPL-MCNC: 215 MG/DL (ref 70–110)
GLUCOSE SERPL-MCNC: 321 MG/DL (ref 70–110)
GLUCOSE SERPL-MCNC: 367 MG/DL (ref 70–110)
GLUCOSE SERPL-MCNC: 73 MG/DL (ref 70–110)
GLUCOSE SERPL-MCNC: 78 MG/DL (ref 70–110)
GLUCOSE SERPL-MCNC: 81 MG/DL (ref 70–110)
GLUCOSE SERPL-MCNC: 84 MG/DL (ref 70–110)
GLUCOSE SERPL-MCNC: 85 MG/DL (ref 70–110)
GLUCOSE SERPL-MCNC: 89 MG/DL (ref 70–110)
GLUCOSE SERPL-MCNC: 90 MG/DL (ref 70–110)
GLUCOSE SERPL-MCNC: 91 MG/DL (ref 70–110)
GLUCOSE SERPL-MCNC: 91 MG/DL (ref 70–110)
GLUCOSE SERPL-MCNC: 92 MG/DL (ref 70–110)
GLUCOSE SERPL-MCNC: 94 MG/DL (ref 70–110)
GLUCOSE SERPL-MCNC: 94 MG/DL (ref 70–110)
GLUCOSE SERPL-MCNC: 95 MG/DL (ref 70–110)
GLUCOSE SERPL-MCNC: 96 MG/DL (ref 70–110)
GLUCOSE SERPL-MCNC: 98 MG/DL (ref 70–110)
GLUCOSE SERPL-MCNC: 98 MG/DL (ref 70–110)
GLUCOSE SERPL-MCNC: 99 MG/DL (ref 70–110)
GLUCOSE UR QL STRIP: NEGATIVE
HAV IGM SERPL QL IA: NORMAL
HBV CORE IGM SERPL QL IA: NORMAL
HBV SURFACE AG SERPL QL IA: NORMAL
HCO3 UR-SCNC: 5.9 MMOL/L (ref 24–28)
HCO3 UR-SCNC: 8 MMOL/L (ref 24–28)
HCO3 UR-SCNC: 9.6 MMOL/L (ref 24–28)
HCT VFR BLD AUTO: 30.7 % (ref 37–48.5)
HCT VFR BLD AUTO: 31.1 % (ref 37–48.5)
HCT VFR BLD AUTO: 31.3 % (ref 37–48.5)
HCT VFR BLD AUTO: 31.6 % (ref 37–48.5)
HCT VFR BLD AUTO: 32 % (ref 37–48.5)
HCT VFR BLD AUTO: 33.7 % (ref 37–48.5)
HCT VFR BLD AUTO: 35.1 % (ref 37–48.5)
HCT VFR BLD AUTO: 35.1 % (ref 37–48.5)
HCT VFR BLD AUTO: 35.4 % (ref 37–48.5)
HCT VFR BLD AUTO: 35.8 % (ref 37–48.5)
HCT VFR BLD AUTO: 35.9 % (ref 37–48.5)
HCT VFR BLD AUTO: 36 % (ref 37–48.5)
HCT VFR BLD AUTO: 36.1 % (ref 37–48.5)
HCT VFR BLD AUTO: 36.3 % (ref 37–48.5)
HCT VFR BLD AUTO: 36.4 % (ref 37–48.5)
HCT VFR BLD AUTO: 38.5 % (ref 37–48.5)
HCT VFR BLD AUTO: 39.7 % (ref 37–48.5)
HCT VFR BLD CALC: 37 %PCV (ref 36–54)
HCV AB SERPL QL IA: NORMAL
HGB BLD-MCNC: 10.5 G/DL (ref 12–16)
HGB BLD-MCNC: 10.5 G/DL (ref 12–16)
HGB BLD-MCNC: 10.7 G/DL (ref 12–16)
HGB BLD-MCNC: 10.7 G/DL (ref 12–16)
HGB BLD-MCNC: 11 G/DL (ref 12–16)
HGB BLD-MCNC: 11.1 G/DL (ref 12–16)
HGB BLD-MCNC: 11.2 G/DL (ref 12–16)
HGB BLD-MCNC: 11.3 G/DL (ref 12–16)
HGB BLD-MCNC: 11.3 G/DL (ref 12–16)
HGB BLD-MCNC: 11.7 G/DL (ref 12–16)
HGB BLD-MCNC: 11.8 G/DL (ref 12–16)
HGB BLD-MCNC: 13 G/DL
HGB BLD-MCNC: 9.2 G/DL (ref 12–16)
HGB BLD-MCNC: 9.3 G/DL (ref 12–16)
HGB BLD-MCNC: 9.6 G/DL (ref 12–16)
HGB BLD-MCNC: 9.6 G/DL (ref 12–16)
HGB BLD-MCNC: 9.9 G/DL (ref 12–16)
HGB BLD-MCNC: 9.9 G/DL (ref 12–16)
HGB UR QL STRIP: ABNORMAL
HYALINE CASTS UR QL AUTO: 0 /LPF
HYALINE CASTS UR QL AUTO: 0 /LPF
HYALINE CASTS UR QL AUTO: 3 /LPF
IMM GRANULOCYTES # BLD AUTO: 0 K/UL (ref 0–0.04)
IMM GRANULOCYTES # BLD AUTO: 0 K/UL (ref 0–0.04)
IMM GRANULOCYTES # BLD AUTO: 0.01 K/UL (ref 0–0.04)
IMM GRANULOCYTES # BLD AUTO: 0.01 K/UL (ref 0–0.04)
IMM GRANULOCYTES # BLD AUTO: ABNORMAL K/UL (ref 0–0.04)
IMM GRANULOCYTES # BLD AUTO: ABNORMAL K/UL (ref 0–0.04)
IMM GRANULOCYTES NFR BLD AUTO: 0 % (ref 0–0.5)
IMM GRANULOCYTES NFR BLD AUTO: 0 % (ref 0–0.5)
IMM GRANULOCYTES NFR BLD AUTO: 0.2 % (ref 0–0.5)
IMM GRANULOCYTES NFR BLD AUTO: 0.3 % (ref 0–0.5)
IMM GRANULOCYTES NFR BLD AUTO: ABNORMAL % (ref 0–0.5)
IMM GRANULOCYTES NFR BLD AUTO: ABNORMAL % (ref 0–0.5)
INR PPP: 1.4 (ref 0.8–1.2)
IRON SERPL-MCNC: 28 UG/DL (ref 30–160)
KETONES UR QL STRIP: ABNORMAL
KETONES UR QL STRIP: NEGATIVE
KETONES UR QL STRIP: NEGATIVE
LACTATE SERPL-SCNC: 1.8 MMOL/L (ref 0.5–2.2)
LACTATE SERPL-SCNC: >12 MMOL/L (ref 0.5–2.2)
LACTATE SERPL-SCNC: NORMAL MMOL/L (ref 0.5–2.2)
LEUKOCYTE ESTERASE UR QL STRIP: ABNORMAL
LEUKOCYTE ESTERASE UR QL STRIP: ABNORMAL
LEUKOCYTE ESTERASE UR QL STRIP: NEGATIVE
LYMPHOCYTES # BLD AUTO: 1.7 K/UL (ref 1–4.8)
LYMPHOCYTES # BLD AUTO: 1.9 K/UL (ref 1–4.8)
LYMPHOCYTES # BLD AUTO: 2 K/UL (ref 1–4.8)
LYMPHOCYTES # BLD AUTO: 2.7 K/UL (ref 1–4.8)
LYMPHOCYTES # BLD AUTO: ABNORMAL K/UL (ref 1–4.8)
LYMPHOCYTES # BLD AUTO: ABNORMAL K/UL (ref 1–4.8)
LYMPHOCYTES NFR BLD: 35 % (ref 18–48)
LYMPHOCYTES NFR BLD: 42 % (ref 18–48)
LYMPHOCYTES NFR BLD: 44.7 % (ref 18–48)
LYMPHOCYTES NFR BLD: 52.8 % (ref 18–48)
LYMPHOCYTES NFR BLD: 63.6 % (ref 18–48)
LYMPHOCYTES NFR BLD: 72 % (ref 18–48)
Lab: NORMAL
MAGNESIUM SERPL-MCNC: 1.3 MG/DL (ref 1.6–2.6)
MAGNESIUM SERPL-MCNC: 1.4 MG/DL (ref 1.6–2.6)
MAGNESIUM SERPL-MCNC: 1.4 MG/DL (ref 1.6–2.6)
MAGNESIUM SERPL-MCNC: 1.5 MG/DL (ref 1.6–2.6)
MAGNESIUM SERPL-MCNC: 1.6 MG/DL (ref 1.6–2.6)
MAGNESIUM SERPL-MCNC: 1.6 MG/DL (ref 1.6–2.6)
MAGNESIUM SERPL-MCNC: 1.7 MG/DL (ref 1.6–2.6)
MAGNESIUM SERPL-MCNC: 1.8 MG/DL (ref 1.6–2.6)
MAGNESIUM SERPL-MCNC: 1.9 MG/DL (ref 1.6–2.6)
MAGNESIUM SERPL-MCNC: 2 MG/DL (ref 1.6–2.6)
MAGNESIUM SERPL-MCNC: 2.2 MG/DL (ref 1.6–2.6)
MAGNESIUM SERPL-MCNC: 2.2 MG/DL (ref 1.6–2.6)
MAGNESIUM SERPL-MCNC: 2.6 MG/DL (ref 1.6–2.6)
MAGNESIUM SERPL-MCNC: 2.7 MG/DL (ref 1.6–2.6)
MCH RBC QN AUTO: 23.3 PG (ref 27–31)
MCH RBC QN AUTO: 23.4 PG (ref 27–31)
MCH RBC QN AUTO: 23.4 PG (ref 27–31)
MCH RBC QN AUTO: 23.5 PG (ref 27–31)
MCH RBC QN AUTO: 23.5 PG (ref 27–31)
MCH RBC QN AUTO: 23.6 PG (ref 27–31)
MCH RBC QN AUTO: 23.7 PG (ref 27–31)
MCH RBC QN AUTO: 23.7 PG (ref 27–31)
MCH RBC QN AUTO: 23.8 PG (ref 27–31)
MCH RBC QN AUTO: 23.9 PG (ref 27–31)
MCH RBC QN AUTO: 24 PG (ref 27–31)
MCH RBC QN AUTO: 24.1 PG (ref 27–31)
MCH RBC QN AUTO: 24.3 PG (ref 27–31)
MCHC RBC AUTO-ENTMCNC: 28.5 G/DL (ref 32–36)
MCHC RBC AUTO-ENTMCNC: 29.4 G/DL (ref 32–36)
MCHC RBC AUTO-ENTMCNC: 29.6 G/DL (ref 32–36)
MCHC RBC AUTO-ENTMCNC: 29.7 G/DL (ref 32–36)
MCHC RBC AUTO-ENTMCNC: 29.7 G/DL (ref 32–36)
MCHC RBC AUTO-ENTMCNC: 29.9 G/DL (ref 32–36)
MCHC RBC AUTO-ENTMCNC: 29.9 G/DL (ref 32–36)
MCHC RBC AUTO-ENTMCNC: 30 G/DL (ref 32–36)
MCHC RBC AUTO-ENTMCNC: 30 G/DL (ref 32–36)
MCHC RBC AUTO-ENTMCNC: 30.6 G/DL (ref 32–36)
MCHC RBC AUTO-ENTMCNC: 30.6 G/DL (ref 32–36)
MCHC RBC AUTO-ENTMCNC: 30.9 G/DL (ref 32–36)
MCHC RBC AUTO-ENTMCNC: 31.1 G/DL (ref 32–36)
MCHC RBC AUTO-ENTMCNC: 31.2 G/DL (ref 32–36)
MCHC RBC AUTO-ENTMCNC: 31.3 G/DL (ref 32–36)
MCHC RBC AUTO-ENTMCNC: 31.6 G/DL (ref 32–36)
MCHC RBC AUTO-ENTMCNC: 32.1 G/DL (ref 32–36)
MCV RBC AUTO: 75 FL (ref 82–98)
MCV RBC AUTO: 76 FL (ref 82–98)
MCV RBC AUTO: 78 FL (ref 82–98)
MCV RBC AUTO: 79 FL (ref 82–98)
MCV RBC AUTO: 79 FL (ref 82–98)
MCV RBC AUTO: 80 FL (ref 82–98)
MCV RBC AUTO: 84 FL (ref 82–98)
MICROSCOPIC COMMENT: ABNORMAL
MIN VOL: 11.9
MODE: ABNORMAL
MONOCYTES # BLD AUTO: 0.6 K/UL (ref 0.3–1)
MONOCYTES # BLD AUTO: 0.9 K/UL (ref 0.3–1)
MONOCYTES # BLD AUTO: 1 K/UL (ref 0.3–1)
MONOCYTES # BLD AUTO: 1.2 K/UL (ref 0.3–1)
MONOCYTES # BLD AUTO: ABNORMAL K/UL (ref 0.3–1)
MONOCYTES # BLD AUTO: ABNORMAL K/UL (ref 0.3–1)
MONOCYTES NFR BLD: 13.1 % (ref 4–15)
MONOCYTES NFR BLD: 15 % (ref 4–15)
MONOCYTES NFR BLD: 23 % (ref 4–15)
MONOCYTES NFR BLD: 25.9 % (ref 4–15)
MONOCYTES NFR BLD: 33.2 % (ref 4–15)
MONOCYTES NFR BLD: 33.7 % (ref 4–15)
NEUTROPHILS # BLD AUTO: 0 K/UL (ref 1.8–7.7)
NEUTROPHILS # BLD AUTO: 0 K/UL (ref 1.8–7.7)
NEUTROPHILS # BLD AUTO: 0.5 K/UL (ref 1.8–7.7)
NEUTROPHILS # BLD AUTO: 1.8 K/UL (ref 1.8–7.7)
NEUTROPHILS NFR BLD: 0.8 % (ref 38–73)
NEUTROPHILS NFR BLD: 1.6 % (ref 38–73)
NEUTROPHILS NFR BLD: 12.1 % (ref 38–73)
NEUTROPHILS NFR BLD: 36 % (ref 38–73)
NEUTROPHILS NFR BLD: 39 % (ref 38–73)
NEUTROPHILS NFR BLD: 40.9 % (ref 38–73)
NEUTS BAND NFR BLD MANUAL: 4 %
NEUTS BAND NFR BLD MANUAL: 5 %
NITRITE UR QL STRIP: NEGATIVE
NRBC BLD-RTO: 0 /100 WBC
NRBC BLD-RTO: 1 /100 WBC
NRBC BLD-RTO: 6 /100 WBC
OSMOLALITY SERPL: 267 MOSM/KG (ref 275–295)
OSMOLALITY SERPL: 274 MOSM/KG (ref 275–295)
OSMOLALITY SERPL: 276 MOSM/KG (ref 275–295)
OSMOLALITY UR: 239 MOSM/KG (ref 50–1200)
OSMOLALITY UR: 460 MOSM/KG (ref 50–1200)
OSMOLALITY UR: 543 MOSM/KG (ref 50–1200)
PCO2 BLDA: 20.7 MMHG (ref 35–45)
PCO2 BLDA: 21.1 MMHG (ref 35–45)
PCO2 BLDA: 23.6 MMHG (ref 35–45)
PEEP: 5
PH SMN: 7.01 [PH] (ref 7.35–7.45)
PH SMN: 7.2 [PH] (ref 7.35–7.45)
PH SMN: 7.26 [PH] (ref 7.35–7.45)
PH UR STRIP: 5 [PH] (ref 5–8)
PH UR STRIP: 6 [PH] (ref 5–8)
PH UR STRIP: 7 [PH] (ref 5–8)
PHOSPHATE SERPL-MCNC: 1.6 MG/DL (ref 2.7–4.5)
PHOSPHATE SERPL-MCNC: 1.9 MG/DL (ref 2.7–4.5)
PHOSPHATE SERPL-MCNC: 2.3 MG/DL (ref 2.7–4.5)
PHOSPHATE SERPL-MCNC: 2.4 MG/DL (ref 2.7–4.5)
PHOSPHATE SERPL-MCNC: 2.4 MG/DL (ref 2.7–4.5)
PHOSPHATE SERPL-MCNC: 2.6 MG/DL (ref 2.7–4.5)
PHOSPHATE SERPL-MCNC: 2.6 MG/DL (ref 2.7–4.5)
PHOSPHATE SERPL-MCNC: 2.8 MG/DL (ref 2.7–4.5)
PHOSPHATE SERPL-MCNC: 2.9 MG/DL (ref 2.7–4.5)
PHOSPHATE SERPL-MCNC: 3 MG/DL (ref 2.7–4.5)
PHOSPHATE SERPL-MCNC: 3 MG/DL (ref 2.7–4.5)
PHOSPHATE SERPL-MCNC: 3.1 MG/DL (ref 2.7–4.5)
PHOSPHATE SERPL-MCNC: 3.1 MG/DL (ref 2.7–4.5)
PHOSPHATE SERPL-MCNC: 3.2 MG/DL (ref 2.7–4.5)
PHOSPHATE SERPL-MCNC: 3.3 MG/DL (ref 2.7–4.5)
PHOSPHATE SERPL-MCNC: 3.3 MG/DL (ref 2.7–4.5)
PHOSPHATE SERPL-MCNC: 3.4 MG/DL (ref 2.7–4.5)
PHOSPHATE SERPL-MCNC: 3.4 MG/DL (ref 2.7–4.5)
PHOSPHATE SERPL-MCNC: 3.5 MG/DL (ref 2.7–4.5)
PHOSPHATE SERPL-MCNC: 3.6 MG/DL (ref 2.7–4.5)
PHOSPHATE SERPL-MCNC: 3.6 MG/DL (ref 2.7–4.5)
PHOSPHATE SERPL-MCNC: 3.7 MG/DL (ref 2.7–4.5)
PHOSPHATE SERPL-MCNC: 3.7 MG/DL (ref 2.7–4.5)
PHOSPHATE SERPL-MCNC: 3.8 MG/DL (ref 2.7–4.5)
PHOSPHATE SERPL-MCNC: 3.9 MG/DL (ref 2.7–4.5)
PHOSPHATE SERPL-MCNC: 4.2 MG/DL (ref 2.7–4.5)
PHOSPHATE SERPL-MCNC: 4.6 MG/DL (ref 2.7–4.5)
PHOSPHATE SERPL-MCNC: 9 MG/DL (ref 2.7–4.5)
PIP: 23
PLATELET # BLD AUTO: 178 K/UL (ref 150–450)
PLATELET # BLD AUTO: 222 K/UL (ref 150–450)
PLATELET # BLD AUTO: 230 K/UL (ref 150–450)
PLATELET # BLD AUTO: 237 K/UL (ref 150–450)
PLATELET # BLD AUTO: 246 K/UL (ref 150–450)
PLATELET # BLD AUTO: 264 K/UL (ref 150–450)
PLATELET # BLD AUTO: 275 K/UL (ref 150–450)
PLATELET # BLD AUTO: 313 K/UL (ref 150–450)
PLATELET # BLD AUTO: 328 K/UL (ref 150–450)
PLATELET # BLD AUTO: 334 K/UL (ref 150–450)
PLATELET # BLD AUTO: 335 K/UL (ref 150–450)
PLATELET # BLD AUTO: 337 K/UL (ref 150–450)
PLATELET # BLD AUTO: 338 K/UL (ref 150–450)
PLATELET # BLD AUTO: 341 K/UL (ref 150–450)
PLATELET # BLD AUTO: 382 K/UL (ref 150–450)
PLATELET # BLD AUTO: 403 K/UL (ref 150–450)
PLATELET # BLD AUTO: 405 K/UL (ref 150–450)
PLATELET BLD QL SMEAR: ABNORMAL
PMV BLD AUTO: 10.1 FL (ref 9.2–12.9)
PMV BLD AUTO: 10.3 FL (ref 9.2–12.9)
PMV BLD AUTO: 10.3 FL (ref 9.2–12.9)
PMV BLD AUTO: 10.4 FL (ref 9.2–12.9)
PMV BLD AUTO: 10.4 FL (ref 9.2–12.9)
PMV BLD AUTO: 10.5 FL (ref 9.2–12.9)
PMV BLD AUTO: 10.7 FL (ref 9.2–12.9)
PMV BLD AUTO: 11 FL (ref 9.2–12.9)
PMV BLD AUTO: 11.1 FL (ref 9.2–12.9)
PMV BLD AUTO: 9.4 FL (ref 9.2–12.9)
PMV BLD AUTO: 9.5 FL (ref 9.2–12.9)
PMV BLD AUTO: 9.6 FL (ref 9.2–12.9)
PMV BLD AUTO: 9.8 FL (ref 9.2–12.9)
PMV BLD AUTO: 9.9 FL (ref 9.2–12.9)
PO2 BLDA: 267 MMHG (ref 80–100)
PO2 BLDA: 304 MMHG (ref 80–100)
PO2 BLDA: 393 MMHG (ref 80–100)
POC BE: -17 MMOL/L
POC BE: -20 MMOL/L
POC BE: -25 MMOL/L
POC IONIZED CALCIUM: 1.02 MMOL/L (ref 1.06–1.42)
POC SATURATED O2: 100 % (ref 95–100)
POC TCO2: 10 MMOL/L (ref 23–27)
POC TCO2: 7 MMOL/L (ref 23–27)
POC TCO2: 9 MMOL/L (ref 23–27)
POCT GLUCOSE: 106 MG/DL (ref 70–110)
POCT GLUCOSE: 123 MG/DL (ref 70–110)
POCT GLUCOSE: 129 MG/DL (ref 70–110)
POCT GLUCOSE: 136 MG/DL (ref 70–110)
POCT GLUCOSE: 248 MG/DL (ref 70–110)
POCT GLUCOSE: 42 MG/DL (ref 70–110)
POCT GLUCOSE: 85 MG/DL (ref 70–110)
POTASSIUM BLD-SCNC: 6 MMOL/L (ref 3.5–5.1)
POTASSIUM SERPL-SCNC: 3.1 MMOL/L (ref 3.5–5.1)
POTASSIUM SERPL-SCNC: 3.1 MMOL/L (ref 3.5–5.1)
POTASSIUM SERPL-SCNC: 3.3 MMOL/L (ref 3.5–5.1)
POTASSIUM SERPL-SCNC: 3.4 MMOL/L (ref 3.5–5.1)
POTASSIUM SERPL-SCNC: 3.5 MMOL/L (ref 3.5–5.1)
POTASSIUM SERPL-SCNC: 3.6 MMOL/L (ref 3.5–5.1)
POTASSIUM SERPL-SCNC: 3.6 MMOL/L (ref 3.5–5.1)
POTASSIUM SERPL-SCNC: 3.7 MMOL/L (ref 3.5–5.1)
POTASSIUM SERPL-SCNC: 3.8 MMOL/L (ref 3.5–5.1)
POTASSIUM SERPL-SCNC: 3.9 MMOL/L (ref 3.5–5.1)
POTASSIUM SERPL-SCNC: 4 MMOL/L (ref 3.5–5.1)
POTASSIUM SERPL-SCNC: 4.1 MMOL/L (ref 3.5–5.1)
POTASSIUM SERPL-SCNC: 4.2 MMOL/L (ref 3.5–5.1)
POTASSIUM SERPL-SCNC: 4.3 MMOL/L (ref 3.5–5.1)
POTASSIUM SERPL-SCNC: 4.3 MMOL/L (ref 3.5–5.1)
POTASSIUM SERPL-SCNC: 4.4 MMOL/L (ref 3.5–5.1)
POTASSIUM SERPL-SCNC: 4.5 MMOL/L (ref 3.5–5.1)
POTASSIUM SERPL-SCNC: 4.7 MMOL/L (ref 3.5–5.1)
POTASSIUM SERPL-SCNC: 4.7 MMOL/L (ref 3.5–5.1)
POTASSIUM SERPL-SCNC: 5.1 MMOL/L (ref 3.5–5.1)
POTASSIUM SERPL-SCNC: 5.2 MMOL/L (ref 3.5–5.1)
POTASSIUM SERPL-SCNC: 5.7 MMOL/L (ref 3.5–5.1)
POTASSIUM SERPL-SCNC: 6.1 MMOL/L (ref 3.5–5.1)
POTASSIUM SERPL-SCNC: 6.6 MMOL/L (ref 3.5–5.1)
POTASSIUM UR-SCNC: 23 MMOL/L (ref 15–95)
POTASSIUM UR-SCNC: 58 MMOL/L (ref 15–95)
PREALB SERPL-MCNC: 7 MG/DL (ref 20–43)
PREALB SERPL-MCNC: 9 MG/DL (ref 20–43)
PROMYELOCYTES NFR BLD MANUAL: 1 %
PROT SERPL-MCNC: 4.9 G/DL (ref 6–8.4)
PROT SERPL-MCNC: 6.7 G/DL (ref 6–8.4)
PROT SERPL-MCNC: 7.2 G/DL (ref 6–8.4)
PROT SERPL-MCNC: 7.5 G/DL (ref 6–8.4)
PROT SERPL-MCNC: 8.3 G/DL (ref 6–8.4)
PROT SERPL-MCNC: 8.4 G/DL (ref 6–8.4)
PROT UR QL STRIP: ABNORMAL
PROT UR-MCNC: 503 MG/DL (ref 0–15)
PROT/CREAT UR: 5.08 MG/G{CREAT} (ref 0–0.2)
PROTHROMBIN TIME: 15.3 SEC (ref 9–12.5)
RBC # BLD AUTO: 3.86 M/UL (ref 4–5.4)
RBC # BLD AUTO: 3.93 M/UL (ref 4–5.4)
RBC # BLD AUTO: 3.99 M/UL (ref 4–5.4)
RBC # BLD AUTO: 4.08 M/UL (ref 4–5.4)
RBC # BLD AUTO: 4.17 M/UL (ref 4–5.4)
RBC # BLD AUTO: 4.24 M/UL (ref 4–5.4)
RBC # BLD AUTO: 4.46 M/UL (ref 4–5.4)
RBC # BLD AUTO: 4.48 M/UL (ref 4–5.4)
RBC # BLD AUTO: 4.6 M/UL (ref 4–5.4)
RBC # BLD AUTO: 4.6 M/UL (ref 4–5.4)
RBC # BLD AUTO: 4.65 M/UL (ref 4–5.4)
RBC # BLD AUTO: 4.7 M/UL (ref 4–5.4)
RBC # BLD AUTO: 4.74 M/UL (ref 4–5.4)
RBC # BLD AUTO: 4.82 M/UL (ref 4–5.4)
RBC # BLD AUTO: 4.91 M/UL (ref 4–5.4)
RBC #/AREA URNS AUTO: >100 /HPF (ref 0–4)
SAMPLE: ABNORMAL
SATURATED IRON: 12 % (ref 20–50)
SITE: ABNORMAL
SMUDGE CELLS BLD QL SMEAR: PRESENT
SMUDGE CELLS BLD QL SMEAR: PRESENT
SODIUM BLD-SCNC: 121 MMOL/L (ref 136–145)
SODIUM SERPL-SCNC: 121 MMOL/L (ref 136–145)
SODIUM SERPL-SCNC: 121 MMOL/L (ref 136–145)
SODIUM SERPL-SCNC: 122 MMOL/L (ref 136–145)
SODIUM SERPL-SCNC: 122 MMOL/L (ref 136–145)
SODIUM SERPL-SCNC: 123 MMOL/L (ref 136–145)
SODIUM SERPL-SCNC: 124 MMOL/L (ref 136–145)
SODIUM SERPL-SCNC: 125 MMOL/L (ref 136–145)
SODIUM SERPL-SCNC: 126 MMOL/L (ref 136–145)
SODIUM SERPL-SCNC: 126 MMOL/L (ref 136–145)
SODIUM SERPL-SCNC: 127 MMOL/L (ref 136–145)
SODIUM SERPL-SCNC: 128 MMOL/L (ref 136–145)
SODIUM SERPL-SCNC: 129 MMOL/L (ref 136–145)
SODIUM SERPL-SCNC: 129 MMOL/L (ref 136–145)
SODIUM SERPL-SCNC: 130 MMOL/L (ref 136–145)
SODIUM SERPL-SCNC: 131 MMOL/L (ref 136–145)
SODIUM SERPL-SCNC: 132 MMOL/L (ref 136–145)
SODIUM SERPL-SCNC: 133 MMOL/L (ref 136–145)
SODIUM SERPL-SCNC: 134 MMOL/L (ref 136–145)
SODIUM SERPL-SCNC: 135 MMOL/L (ref 136–145)
SODIUM SERPL-SCNC: 136 MMOL/L (ref 136–145)
SODIUM UR-SCNC: 16 MMOL/L (ref 20–250)
SODIUM UR-SCNC: 24 MMOL/L (ref 20–250)
SODIUM UR-SCNC: 81 MMOL/L (ref 20–250)
SP GR UR STRIP: 1.02 (ref 1–1.03)
SP02: 79
SQUAMOUS #/AREA URNS AUTO: 4 /HPF
TOTAL IRON BINDING CAPACITY: 229 UG/DL (ref 250–450)
TOXIC GRANULES BLD QL SMEAR: PRESENT
TRANS ERYTHROCYTES VOL PATIENT: NORMAL ML
TRANS ERYTHROCYTES VOL PATIENT: NORMAL ML
TRANSFERRIN SERPL-MCNC: 155 MG/DL (ref 200–375)
TROPONIN I SERPL DL<=0.01 NG/ML-MCNC: 0.02 NG/ML (ref 0–0.03)
TROPONIN I SERPL DL<=0.01 NG/ML-MCNC: 0.05 NG/ML (ref 0–0.03)
TROPONIN I SERPL DL<=0.01 NG/ML-MCNC: 0.06 NG/ML (ref 0–0.03)
TSH SERPL DL<=0.005 MIU/L-ACNC: 2.72 UIU/ML (ref 0.4–4)
TSH SERPL DL<=0.005 MIU/L-ACNC: 2.76 UIU/ML (ref 0.4–4)
UNSPECIFIED CRY UR QL COMP ASSIST: ABNORMAL
URATE SERPL-MCNC: 4.9 MG/DL (ref 2.4–5.7)
URATE UR-MCNC: 42.4 MG/DL
URN SPEC COLLECT METH UR: ABNORMAL
UUN UR-MCNC: 725 MG/DL (ref 140–1050)
UUN UR-MCNC: 86 MG/DL (ref 140–1050)
VT: 410
WBC # BLD AUTO: 1.74 K/UL (ref 3.9–12.7)
WBC # BLD AUTO: 2.32 K/UL (ref 3.9–12.7)
WBC # BLD AUTO: 2.33 K/UL (ref 3.9–12.7)
WBC # BLD AUTO: 2.54 K/UL (ref 3.9–12.7)
WBC # BLD AUTO: 2.59 K/UL (ref 3.9–12.7)
WBC # BLD AUTO: 2.64 K/UL (ref 3.9–12.7)
WBC # BLD AUTO: 2.95 K/UL (ref 3.9–12.7)
WBC # BLD AUTO: 3.21 K/UL (ref 3.9–12.7)
WBC # BLD AUTO: 3.31 K/UL (ref 3.9–12.7)
WBC # BLD AUTO: 3.43 K/UL (ref 3.9–12.7)
WBC # BLD AUTO: 3.71 K/UL (ref 3.9–12.7)
WBC # BLD AUTO: 3.78 K/UL (ref 3.9–12.7)
WBC # BLD AUTO: 4.34 K/UL (ref 3.9–12.7)
WBC # BLD AUTO: 4.68 K/UL (ref 3.9–12.7)
WBC # BLD AUTO: 4.7 K/UL (ref 3.9–12.7)
WBC # BLD AUTO: 4.99 K/UL (ref 3.9–12.7)
WBC # BLD AUTO: 5.99 K/UL (ref 3.9–12.7)
WBC #/AREA URNS AUTO: 0 /HPF (ref 0–5)
WBC #/AREA URNS AUTO: 4 /HPF (ref 0–5)
WBC #/AREA URNS AUTO: 62 /HPF (ref 0–5)
WBC CLUMPS UR QL AUTO: ABNORMAL
WBC TOXIC VACUOLES BLD QL SMEAR: PRESENT
YEAST UR QL AUTO: ABNORMAL

## 2023-01-01 PROCEDURE — 20600001 HC STEP DOWN PRIVATE ROOM

## 2023-01-01 PROCEDURE — 3288F FALL RISK ASSESSMENT DOCD: CPT | Mod: CPTII,S$GLB,, | Performed by: INTERNAL MEDICINE

## 2023-01-01 PROCEDURE — 96376 TX/PRO/DX INJ SAME DRUG ADON: CPT

## 2023-01-01 PROCEDURE — 63600175 PHARM REV CODE 636 W HCPCS: Performed by: INTERNAL MEDICINE

## 2023-01-01 PROCEDURE — 1157F ADVNC CARE PLAN IN RCRD: CPT | Mod: CPTII,S$GLB,, | Performed by: STUDENT IN AN ORGANIZED HEALTH CARE EDUCATION/TRAINING PROGRAM

## 2023-01-01 PROCEDURE — 84100 ASSAY OF PHOSPHORUS: CPT | Performed by: STUDENT IN AN ORGANIZED HEALTH CARE EDUCATION/TRAINING PROGRAM

## 2023-01-01 PROCEDURE — 63600175 PHARM REV CODE 636 W HCPCS: Performed by: HOSPITALIST

## 2023-01-01 PROCEDURE — 1125F AMNT PAIN NOTED PAIN PRSNT: CPT | Mod: CPTII,S$GLB,, | Performed by: INTERNAL MEDICINE

## 2023-01-01 PROCEDURE — 84443 ASSAY THYROID STIM HORMONE: CPT | Performed by: STUDENT IN AN ORGANIZED HEALTH CARE EDUCATION/TRAINING PROGRAM

## 2023-01-01 PROCEDURE — 51702 INSERT TEMP BLADDER CATH: CPT

## 2023-01-01 PROCEDURE — 99900026 HC AIRWAY MAINTENANCE (STAT)

## 2023-01-01 PROCEDURE — 25000003 PHARM REV CODE 250: Performed by: STUDENT IN AN ORGANIZED HEALTH CARE EDUCATION/TRAINING PROGRAM

## 2023-01-01 PROCEDURE — 1157F ADVNC CARE PLAN IN RCRD: CPT | Mod: CPTII,S$GLB,, | Performed by: NURSE PRACTITIONER

## 2023-01-01 PROCEDURE — 83880 ASSAY OF NATRIURETIC PEPTIDE: CPT | Performed by: STUDENT IN AN ORGANIZED HEALTH CARE EDUCATION/TRAINING PROGRAM

## 2023-01-01 PROCEDURE — 4010F ACE/ARB THERAPY RXD/TAKEN: CPT | Mod: CPTII,95,, | Performed by: INTERNAL MEDICINE

## 2023-01-01 PROCEDURE — 63600175 PHARM REV CODE 636 W HCPCS

## 2023-01-01 PROCEDURE — 4010F PR ACE/ARB THEARPY RXD/TAKEN: ICD-10-PCS | Mod: CPTII,S$GLB,, | Performed by: CLINICAL NURSE SPECIALIST

## 2023-01-01 PROCEDURE — 84443 ASSAY THYROID STIM HORMONE: CPT

## 2023-01-01 PROCEDURE — 52332 PR CYSTOSCOPY,INSERT URETERAL STENT: ICD-10-PCS | Mod: RT,,, | Performed by: UROLOGY

## 2023-01-01 PROCEDURE — 1101F PR PT FALLS ASSESS DOC 0-1 FALLS W/OUT INJ PAST YR: ICD-10-PCS | Mod: CPTII,S$GLB,, | Performed by: INTERNAL MEDICINE

## 2023-01-01 PROCEDURE — 99900035 HC TECH TIME PER 15 MIN (STAT)

## 2023-01-01 PROCEDURE — 99291 PR CRITICAL CARE, E/M 30-74 MINUTES: ICD-10-PCS | Mod: 25,GC,, | Performed by: INTERNAL MEDICINE

## 2023-01-01 PROCEDURE — 83735 ASSAY OF MAGNESIUM: CPT | Performed by: STUDENT IN AN ORGANIZED HEALTH CARE EDUCATION/TRAINING PROGRAM

## 2023-01-01 PROCEDURE — 97535 SELF CARE MNGMENT TRAINING: CPT

## 2023-01-01 PROCEDURE — D9220A PRA ANESTHESIA: ICD-10-PCS | Mod: ANES,,, | Performed by: ANESTHESIOLOGY

## 2023-01-01 PROCEDURE — 1159F PR MEDICATION LIST DOCUMENTED IN MEDICAL RECORD: ICD-10-PCS | Mod: CPTII,S$GLB,, | Performed by: INTERNAL MEDICINE

## 2023-01-01 PROCEDURE — 99024 POSTOP FOLLOW-UP VISIT: CPT | Mod: S$GLB,,, | Performed by: CLINICAL NURSE SPECIALIST

## 2023-01-01 PROCEDURE — 3288F PR FALLS RISK ASSESSMENT DOCUMENTED: ICD-10-PCS | Mod: CPTII,S$GLB,, | Performed by: INTERNAL MEDICINE

## 2023-01-01 PROCEDURE — 85007 BL SMEAR W/DIFF WBC COUNT: CPT | Performed by: INTERNAL MEDICINE

## 2023-01-01 PROCEDURE — 99443 PR PHYSICIAN TELEPHONE EVALUATION 21-30 MIN: ICD-10-PCS | Mod: 95,,, | Performed by: INTERNAL MEDICINE

## 2023-01-01 PROCEDURE — 3074F PR MOST RECENT SYSTOLIC BLOOD PRESSURE < 130 MM HG: ICD-10-PCS | Mod: CPTII,S$GLB,, | Performed by: UROLOGY

## 2023-01-01 PROCEDURE — 20000000 HC ICU ROOM

## 2023-01-01 PROCEDURE — 25000003 PHARM REV CODE 250: Performed by: HOSPITALIST

## 2023-01-01 PROCEDURE — 3008F PR BODY MASS INDEX (BMI) DOCUMENTED: ICD-10-PCS | Mod: CPTII,S$GLB,, | Performed by: STUDENT IN AN ORGANIZED HEALTH CARE EDUCATION/TRAINING PROGRAM

## 2023-01-01 PROCEDURE — D9220A PRA ANESTHESIA: ICD-10-PCS | Mod: CRNA,,, | Performed by: NURSE ANESTHETIST, CERTIFIED REGISTERED

## 2023-01-01 PROCEDURE — 36415 COLL VENOUS BLD VENIPUNCTURE: CPT | Performed by: STUDENT IN AN ORGANIZED HEALTH CARE EDUCATION/TRAINING PROGRAM

## 2023-01-01 PROCEDURE — 1101F PR PT FALLS ASSESS DOC 0-1 FALLS W/OUT INJ PAST YR: ICD-10-PCS | Mod: CPTII,S$GLB,, | Performed by: SURGERY

## 2023-01-01 PROCEDURE — 99233 PR SUBSEQUENT HOSPITAL CARE,LEVL III: ICD-10-PCS | Mod: GC,,, | Performed by: HOSPITALIST

## 2023-01-01 PROCEDURE — 25000003 PHARM REV CODE 250

## 2023-01-01 PROCEDURE — 99024 PR POST-OP FOLLOW-UP VISIT: ICD-10-PCS | Mod: S$GLB,,, | Performed by: STUDENT IN AN ORGANIZED HEALTH CARE EDUCATION/TRAINING PROGRAM

## 2023-01-01 PROCEDURE — 25000003 PHARM REV CODE 250: Performed by: INTERNAL MEDICINE

## 2023-01-01 PROCEDURE — 97530 THERAPEUTIC ACTIVITIES: CPT | Mod: CQ

## 2023-01-01 PROCEDURE — 80048 BASIC METABOLIC PNL TOTAL CA: CPT | Performed by: STUDENT IN AN ORGANIZED HEALTH CARE EDUCATION/TRAINING PROGRAM

## 2023-01-01 PROCEDURE — 94761 N-INVAS EAR/PLS OXIMETRY MLT: CPT

## 2023-01-01 PROCEDURE — 97110 THERAPEUTIC EXERCISES: CPT | Mod: CQ

## 2023-01-01 PROCEDURE — 99233 SBSQ HOSP IP/OBS HIGH 50: CPT | Mod: ,,, | Performed by: HOSPITALIST

## 2023-01-01 PROCEDURE — 1159F PR MEDICATION LIST DOCUMENTED IN MEDICAL RECORD: ICD-10-PCS | Mod: CPTII,95,, | Performed by: INTERNAL MEDICINE

## 2023-01-01 PROCEDURE — 27000221 HC OXYGEN, UP TO 24 HOURS

## 2023-01-01 PROCEDURE — 1159F MED LIST DOCD IN RCRD: CPT | Mod: CPTII,S$GLB,, | Performed by: STUDENT IN AN ORGANIZED HEALTH CARE EDUCATION/TRAINING PROGRAM

## 2023-01-01 PROCEDURE — 99497 PR ADVNCD CARE PLAN 30 MIN: ICD-10-PCS | Mod: 25,,, | Performed by: STUDENT IN AN ORGANIZED HEALTH CARE EDUCATION/TRAINING PROGRAM

## 2023-01-01 PROCEDURE — 80048 BASIC METABOLIC PNL TOTAL CA: CPT | Performed by: INTERNAL MEDICINE

## 2023-01-01 PROCEDURE — 63600175 PHARM REV CODE 636 W HCPCS: Performed by: STUDENT IN AN ORGANIZED HEALTH CARE EDUCATION/TRAINING PROGRAM

## 2023-01-01 PROCEDURE — 99232 SBSQ HOSP IP/OBS MODERATE 35: CPT | Mod: ,,, | Performed by: INTERNAL MEDICINE

## 2023-01-01 PROCEDURE — 1111F DSCHRG MED/CURRENT MED MERGE: CPT | Mod: CPTII,S$GLB,, | Performed by: INTERNAL MEDICINE

## 2023-01-01 PROCEDURE — 27000513 HC SENSOR FLOTRAC

## 2023-01-01 PROCEDURE — 99999 PR PBB SHADOW E&M-EST. PATIENT-LVL IV: ICD-10-PCS | Mod: PBBFAC,,,

## 2023-01-01 PROCEDURE — 86140 C-REACTIVE PROTEIN: CPT | Performed by: STUDENT IN AN ORGANIZED HEALTH CARE EDUCATION/TRAINING PROGRAM

## 2023-01-01 PROCEDURE — 83930 ASSAY OF BLOOD OSMOLALITY: CPT | Performed by: STUDENT IN AN ORGANIZED HEALTH CARE EDUCATION/TRAINING PROGRAM

## 2023-01-01 PROCEDURE — 11046 DBRDMT MUSC&/FSCA EA ADDL: CPT | Mod: ,,, | Performed by: SURGERY

## 2023-01-01 PROCEDURE — 96367 TX/PROPH/DG ADDL SEQ IV INF: CPT

## 2023-01-01 PROCEDURE — 3074F PR MOST RECENT SYSTOLIC BLOOD PRESSURE < 130 MM HG: ICD-10-PCS | Mod: CPTII,S$GLB,, | Performed by: NURSE PRACTITIONER

## 2023-01-01 PROCEDURE — 1125F PR PAIN SEVERITY QUANTIFIED, PAIN PRESENT: ICD-10-PCS | Mod: CPTII,S$GLB,, | Performed by: INTERNAL MEDICINE

## 2023-01-01 PROCEDURE — 25000003 PHARM REV CODE 250: Performed by: EMERGENCY MEDICINE

## 2023-01-01 PROCEDURE — 51798 US URINE CAPACITY MEASURE: CPT

## 2023-01-01 PROCEDURE — 97116 GAIT TRAINING THERAPY: CPT

## 2023-01-01 PROCEDURE — 31500 PR INSERT, EMERGENCY ENDOTRACH AIRWAY: ICD-10-PCS | Mod: GC,,, | Performed by: INTERNAL MEDICINE

## 2023-01-01 PROCEDURE — 85027 COMPLETE CBC AUTOMATED: CPT | Performed by: STUDENT IN AN ORGANIZED HEALTH CARE EDUCATION/TRAINING PROGRAM

## 2023-01-01 PROCEDURE — 1159F MED LIST DOCD IN RCRD: CPT | Mod: CPTII,S$GLB,, | Performed by: CLINICAL NURSE SPECIALIST

## 2023-01-01 PROCEDURE — 3078F PR MOST RECENT DIASTOLIC BLOOD PRESSURE < 80 MM HG: ICD-10-PCS | Mod: CPTII,S$GLB,, | Performed by: SURGERY

## 2023-01-01 PROCEDURE — 3074F PR MOST RECENT SYSTOLIC BLOOD PRESSURE < 130 MM HG: ICD-10-PCS | Mod: CPTII,S$GLB,, | Performed by: SURGERY

## 2023-01-01 PROCEDURE — 3074F PR MOST RECENT SYSTOLIC BLOOD PRESSURE < 130 MM HG: ICD-10-PCS | Mod: CPTII,S$GLB,, | Performed by: INTERNAL MEDICINE

## 2023-01-01 PROCEDURE — 1160F RVW MEDS BY RX/DR IN RCRD: CPT | Mod: CPTII,S$GLB,, | Performed by: INTERNAL MEDICINE

## 2023-01-01 PROCEDURE — 99233 PR SUBSEQUENT HOSPITAL CARE,LEVL III: ICD-10-PCS | Mod: 95,GC,, | Performed by: STUDENT IN AN ORGANIZED HEALTH CARE EDUCATION/TRAINING PROGRAM

## 2023-01-01 PROCEDURE — 1125F PR PAIN SEVERITY QUANTIFIED, PAIN PRESENT: ICD-10-PCS | Mod: CPTII,S$GLB,, | Performed by: SURGERY

## 2023-01-01 PROCEDURE — 99232 PR SUBSEQUENT HOSPITAL CARE,LEVL II: ICD-10-PCS | Mod: ,,, | Performed by: STUDENT IN AN ORGANIZED HEALTH CARE EDUCATION/TRAINING PROGRAM

## 2023-01-01 PROCEDURE — 1125F AMNT PAIN NOTED PAIN PRSNT: CPT | Mod: CPTII,S$GLB,, | Performed by: SURGERY

## 2023-01-01 PROCEDURE — 1125F PR PAIN SEVERITY QUANTIFIED, PAIN PRESENT: ICD-10-PCS | Mod: CPTII,S$GLB,, | Performed by: UROLOGY

## 2023-01-01 PROCEDURE — 36620 PR INSERT CATH,ART,PERCUT,SHORTTERM: ICD-10-PCS | Mod: 59,GC,, | Performed by: INTERNAL MEDICINE

## 2023-01-01 PROCEDURE — 99999 PR PBB SHADOW E&M-EST. PATIENT-LVL V: CPT | Mod: PBBFAC,,, | Performed by: INTERNAL MEDICINE

## 2023-01-01 PROCEDURE — 84540 ASSAY OF URINE/UREA-N: CPT

## 2023-01-01 PROCEDURE — 99999 PR PBB SHADOW E&M-EST. PATIENT-LVL II: ICD-10-PCS | Mod: PBBFAC,,, | Performed by: STUDENT IN AN ORGANIZED HEALTH CARE EDUCATION/TRAINING PROGRAM

## 2023-01-01 PROCEDURE — 99499 RISK ADDL DX/OHS AUDIT: ICD-10-PCS | Mod: S$GLB,,, | Performed by: SURGERY

## 2023-01-01 PROCEDURE — 25000242 PHARM REV CODE 250 ALT 637 W/ HCPCS: Performed by: STUDENT IN AN ORGANIZED HEALTH CARE EDUCATION/TRAINING PROGRAM

## 2023-01-01 PROCEDURE — G0378 HOSPITAL OBSERVATION PER HR: HCPCS

## 2023-01-01 PROCEDURE — 3008F BODY MASS INDEX DOCD: CPT | Mod: CPTII,S$GLB,, | Performed by: UROLOGY

## 2023-01-01 PROCEDURE — 27201423 OPTIME MED/SURG SUP & DEVICES STERILE SUPPLY: Performed by: STUDENT IN AN ORGANIZED HEALTH CARE EDUCATION/TRAINING PROGRAM

## 2023-01-01 PROCEDURE — 87040 BLOOD CULTURE FOR BACTERIA: CPT | Performed by: INTERNAL MEDICINE

## 2023-01-01 PROCEDURE — 83935 ASSAY OF URINE OSMOLALITY: CPT

## 2023-01-01 PROCEDURE — 93010 EKG 12-LEAD: ICD-10-PCS | Mod: ,,, | Performed by: INTERNAL MEDICINE

## 2023-01-01 PROCEDURE — 84300 ASSAY OF URINE SODIUM: CPT | Performed by: STUDENT IN AN ORGANIZED HEALTH CARE EDUCATION/TRAINING PROGRAM

## 2023-01-01 PROCEDURE — 11043 DBRDMT MUSC&/FSCA 1ST 20/<: CPT | Mod: ,,, | Performed by: SURGERY

## 2023-01-01 PROCEDURE — 80048 BASIC METABOLIC PNL TOTAL CA: CPT | Mod: 91 | Performed by: STUDENT IN AN ORGANIZED HEALTH CARE EDUCATION/TRAINING PROGRAM

## 2023-01-01 PROCEDURE — 3288F PR FALLS RISK ASSESSMENT DOCUMENTED: ICD-10-PCS | Mod: CPTII,S$GLB,, | Performed by: UROLOGY

## 2023-01-01 PROCEDURE — 84133 ASSAY OF URINE POTASSIUM: CPT | Performed by: STUDENT IN AN ORGANIZED HEALTH CARE EDUCATION/TRAINING PROGRAM

## 2023-01-01 PROCEDURE — 83735 ASSAY OF MAGNESIUM: CPT | Performed by: EMERGENCY MEDICINE

## 2023-01-01 PROCEDURE — 1157F PR ADVANCE CARE PLAN OR EQUIV PRESENT IN MEDICAL RECORD: ICD-10-PCS | Mod: CPTII,S$GLB,, | Performed by: CLINICAL NURSE SPECIALIST

## 2023-01-01 PROCEDURE — 99900031 HC PATIENT EDUCATION (STAT)

## 2023-01-01 PROCEDURE — 3008F PR BODY MASS INDEX (BMI) DOCUMENTED: ICD-10-PCS | Mod: CPTII,S$GLB,, | Performed by: INTERNAL MEDICINE

## 2023-01-01 PROCEDURE — 1159F PR MEDICATION LIST DOCUMENTED IN MEDICAL RECORD: ICD-10-PCS | Mod: CPTII,S$GLB,, | Performed by: CLINICAL NURSE SPECIALIST

## 2023-01-01 PROCEDURE — 1159F MED LIST DOCD IN RCRD: CPT | Mod: CPTII,S$GLB,, | Performed by: INTERNAL MEDICINE

## 2023-01-01 PROCEDURE — 99999 PR PBB SHADOW E&M-EST. PATIENT-LVL IV: CPT | Mod: PBBFAC,,, | Performed by: CLINICAL NURSE SPECIALIST

## 2023-01-01 PROCEDURE — 97530 THERAPEUTIC ACTIVITIES: CPT

## 2023-01-01 PROCEDURE — 99223 PR INITIAL HOSPITAL CARE,LEVL III: ICD-10-PCS | Mod: AI,,, | Performed by: STUDENT IN AN ORGANIZED HEALTH CARE EDUCATION/TRAINING PROGRAM

## 2023-01-01 PROCEDURE — 3008F BODY MASS INDEX DOCD: CPT | Mod: CPTII,S$GLB,, | Performed by: SURGERY

## 2023-01-01 PROCEDURE — 4010F ACE/ARB THERAPY RXD/TAKEN: CPT | Mod: CPTII,S$GLB,, | Performed by: STUDENT IN AN ORGANIZED HEALTH CARE EDUCATION/TRAINING PROGRAM

## 2023-01-01 PROCEDURE — 99232 PR SUBSEQUENT HOSPITAL CARE,LEVL II: ICD-10-PCS | Mod: ,,, | Performed by: INTERNAL MEDICINE

## 2023-01-01 PROCEDURE — 4010F PR ACE/ARB THEARPY RXD/TAKEN: ICD-10-PCS | Mod: CPTII,S$GLB,, | Performed by: INTERNAL MEDICINE

## 2023-01-01 PROCEDURE — 99024 POSTOP FOLLOW-UP VISIT: CPT | Mod: S$GLB,,, | Performed by: STUDENT IN AN ORGANIZED HEALTH CARE EDUCATION/TRAINING PROGRAM

## 2023-01-01 PROCEDURE — 80048 BASIC METABOLIC PNL TOTAL CA: CPT | Mod: XB

## 2023-01-01 PROCEDURE — 1160F PR REVIEW ALL MEDS BY PRESCRIBER/CLIN PHARMACIST DOCUMENTED: ICD-10-PCS | Mod: CPTII,S$GLB,, | Performed by: INTERNAL MEDICINE

## 2023-01-01 PROCEDURE — 3288F FALL RISK ASSESSMENT DOCD: CPT | Mod: CPTII,S$GLB,, | Performed by: UROLOGY

## 2023-01-01 PROCEDURE — 1101F PR PT FALLS ASSESS DOC 0-1 FALLS W/OUT INJ PAST YR: ICD-10-PCS | Mod: CPTII,S$GLB,, | Performed by: NURSE PRACTITIONER

## 2023-01-01 PROCEDURE — 99233 SBSQ HOSP IP/OBS HIGH 50: CPT | Mod: GC,,, | Performed by: HOSPITALIST

## 2023-01-01 PROCEDURE — 36415 COLL VENOUS BLD VENIPUNCTURE: CPT | Performed by: EMERGENCY MEDICINE

## 2023-01-01 PROCEDURE — 99233 SBSQ HOSP IP/OBS HIGH 50: CPT | Mod: ,,, | Performed by: INTERNAL MEDICINE

## 2023-01-01 PROCEDURE — 99223 1ST HOSP IP/OBS HIGH 75: CPT | Mod: AI,,, | Performed by: STUDENT IN AN ORGANIZED HEALTH CARE EDUCATION/TRAINING PROGRAM

## 2023-01-01 PROCEDURE — 99215 OFFICE O/P EST HI 40 MIN: CPT | Mod: S$GLB,,, | Performed by: INTERNAL MEDICINE

## 2023-01-01 PROCEDURE — 94003 VENT MGMT INPAT SUBQ DAY: CPT

## 2023-01-01 PROCEDURE — 80053 COMPREHEN METABOLIC PANEL: CPT | Performed by: INTERNAL MEDICINE

## 2023-01-01 PROCEDURE — 96366 THER/PROPH/DIAG IV INF ADDON: CPT

## 2023-01-01 PROCEDURE — 71000033 HC RECOVERY, INTIAL HOUR: Performed by: STUDENT IN AN ORGANIZED HEALTH CARE EDUCATION/TRAINING PROGRAM

## 2023-01-01 PROCEDURE — 3288F FALL RISK ASSESSMENT DOCD: CPT | Mod: CPTII,S$GLB,, | Performed by: SURGERY

## 2023-01-01 PROCEDURE — 3074F SYST BP LT 130 MM HG: CPT | Mod: CPTII,S$GLB,, | Performed by: UROLOGY

## 2023-01-01 PROCEDURE — 99205 PR OFFICE/OUTPT VISIT, NEW, LEVL V, 60-74 MIN: ICD-10-PCS | Mod: S$GLB,,, | Performed by: SURGERY

## 2023-01-01 PROCEDURE — 99232 SBSQ HOSP IP/OBS MODERATE 35: CPT | Mod: GC,,, | Performed by: HOSPITALIST

## 2023-01-01 PROCEDURE — 99999 PR PBB SHADOW E&M-EST. PATIENT-LVL III: CPT | Mod: PBBFAC,,, | Performed by: CLINICAL NURSE SPECIALIST

## 2023-01-01 PROCEDURE — 85027 COMPLETE CBC AUTOMATED: CPT | Performed by: INTERNAL MEDICINE

## 2023-01-01 PROCEDURE — 99232 PR SUBSEQUENT HOSPITAL CARE,LEVL II: ICD-10-PCS | Mod: GC,,, | Performed by: HOSPITALIST

## 2023-01-01 PROCEDURE — 1111F DSCHRG MED/CURRENT MED MERGE: CPT | Mod: CPTII,S$GLB,, | Performed by: UROLOGY

## 2023-01-01 PROCEDURE — 99223 1ST HOSP IP/OBS HIGH 75: CPT | Mod: GC,,, | Performed by: INTERNAL MEDICINE

## 2023-01-01 PROCEDURE — 82962 GLUCOSE BLOOD TEST: CPT

## 2023-01-01 PROCEDURE — 3008F BODY MASS INDEX DOCD: CPT | Mod: CPTII,S$GLB,, | Performed by: NURSE PRACTITIONER

## 2023-01-01 PROCEDURE — 99223 PR INITIAL HOSPITAL CARE,LEVL III: ICD-10-PCS | Mod: ,,, | Performed by: STUDENT IN AN ORGANIZED HEALTH CARE EDUCATION/TRAINING PROGRAM

## 2023-01-01 PROCEDURE — 93005 ELECTROCARDIOGRAM TRACING: CPT

## 2023-01-01 PROCEDURE — 86141 C-REACTIVE PROTEIN HS: CPT | Performed by: STUDENT IN AN ORGANIZED HEALTH CARE EDUCATION/TRAINING PROGRAM

## 2023-01-01 PROCEDURE — 3078F PR MOST RECENT DIASTOLIC BLOOD PRESSURE < 80 MM HG: ICD-10-PCS | Mod: CPTII,S$GLB,, | Performed by: INTERNAL MEDICINE

## 2023-01-01 PROCEDURE — 1160F RVW MEDS BY RX/DR IN RCRD: CPT | Mod: CPTII,95,, | Performed by: INTERNAL MEDICINE

## 2023-01-01 PROCEDURE — 36556 INSERT NON-TUNNEL CV CATH: CPT | Mod: 59,GC,, | Performed by: INTERNAL MEDICINE

## 2023-01-01 PROCEDURE — 80074 ACUTE HEPATITIS PANEL: CPT | Performed by: EMERGENCY MEDICINE

## 2023-01-01 PROCEDURE — 99497 ADVNCD CARE PLAN 30 MIN: CPT | Mod: 25,,, | Performed by: STUDENT IN AN ORGANIZED HEALTH CARE EDUCATION/TRAINING PROGRAM

## 2023-01-01 PROCEDURE — 99223 PR INITIAL HOSPITAL CARE,LEVL III: ICD-10-PCS | Mod: ,,,

## 2023-01-01 PROCEDURE — 4010F PR ACE/ARB THEARPY RXD/TAKEN: ICD-10-PCS | Mod: CPTII,S$GLB,, | Performed by: UROLOGY

## 2023-01-01 PROCEDURE — 36000708 HC OR TIME LEV III 1ST 15 MIN: Performed by: STUDENT IN AN ORGANIZED HEALTH CARE EDUCATION/TRAINING PROGRAM

## 2023-01-01 PROCEDURE — 3008F BODY MASS INDEX DOCD: CPT | Mod: CPTII,S$GLB,, | Performed by: STUDENT IN AN ORGANIZED HEALTH CARE EDUCATION/TRAINING PROGRAM

## 2023-01-01 PROCEDURE — 3078F DIAST BP <80 MM HG: CPT | Mod: CPTII,S$GLB,, | Performed by: SURGERY

## 2023-01-01 PROCEDURE — 83735 ASSAY OF MAGNESIUM: CPT | Performed by: INTERNAL MEDICINE

## 2023-01-01 PROCEDURE — 52310 CYSTOSCOPY AND TREATMENT: CPT | Mod: GC,S$GLB,, | Performed by: STUDENT IN AN ORGANIZED HEALTH CARE EDUCATION/TRAINING PROGRAM

## 2023-01-01 PROCEDURE — 99291 CRITICAL CARE FIRST HOUR: CPT | Mod: 25,GC,, | Performed by: INTERNAL MEDICINE

## 2023-01-01 PROCEDURE — 99214 OFFICE O/P EST MOD 30 MIN: CPT | Mod: S$GLB,,, | Performed by: INTERNAL MEDICINE

## 2023-01-01 PROCEDURE — 84100 ASSAY OF PHOSPHORUS: CPT | Performed by: INTERNAL MEDICINE

## 2023-01-01 PROCEDURE — 80053 COMPREHEN METABOLIC PANEL: CPT | Mod: 91 | Performed by: INTERNAL MEDICINE

## 2023-01-01 PROCEDURE — 52332 CYSTOSCOPY AND TREATMENT: CPT | Mod: RT,,, | Performed by: UROLOGY

## 2023-01-01 PROCEDURE — 93010 ELECTROCARDIOGRAM REPORT: CPT | Mod: ,,, | Performed by: INTERNAL MEDICINE

## 2023-01-01 PROCEDURE — 83605 ASSAY OF LACTIC ACID: CPT | Performed by: INTERNAL MEDICINE

## 2023-01-01 PROCEDURE — 80053 COMPREHEN METABOLIC PANEL: CPT | Performed by: STUDENT IN AN ORGANIZED HEALTH CARE EDUCATION/TRAINING PROGRAM

## 2023-01-01 PROCEDURE — 3078F DIAST BP <80 MM HG: CPT | Mod: CPTII,S$GLB,, | Performed by: INTERNAL MEDICINE

## 2023-01-01 PROCEDURE — 84295 ASSAY OF SERUM SODIUM: CPT | Performed by: STUDENT IN AN ORGANIZED HEALTH CARE EDUCATION/TRAINING PROGRAM

## 2023-01-01 PROCEDURE — 80048 BASIC METABOLIC PNL TOTAL CA: CPT | Mod: XB | Performed by: STUDENT IN AN ORGANIZED HEALTH CARE EDUCATION/TRAINING PROGRAM

## 2023-01-01 PROCEDURE — 93295 PR INTERROG EVAL, REMOTE, UP TO 90 DAYS,CARDVERT/DEFIB: ICD-10-PCS | Mod: S$GLB,,, | Performed by: INTERNAL MEDICINE

## 2023-01-01 PROCEDURE — 84156 ASSAY OF PROTEIN URINE: CPT | Performed by: STUDENT IN AN ORGANIZED HEALTH CARE EDUCATION/TRAINING PROGRAM

## 2023-01-01 PROCEDURE — 1160F PR REVIEW ALL MEDS BY PRESCRIBER/CLIN PHARMACIST DOCUMENTED: ICD-10-PCS | Mod: CPTII,S$GLB,, | Performed by: CLINICAL NURSE SPECIALIST

## 2023-01-01 PROCEDURE — 93010 ELECTROCARDIOGRAM REPORT: CPT | Mod: 76,,, | Performed by: INTERNAL MEDICINE

## 2023-01-01 PROCEDURE — 85025 COMPLETE CBC W/AUTO DIFF WBC: CPT | Performed by: STUDENT IN AN ORGANIZED HEALTH CARE EDUCATION/TRAINING PROGRAM

## 2023-01-01 PROCEDURE — 1101F PT FALLS ASSESS-DOCD LE1/YR: CPT | Mod: CPTII,S$GLB,, | Performed by: UROLOGY

## 2023-01-01 PROCEDURE — 86900 BLOOD TYPING SEROLOGIC ABO: CPT | Performed by: NURSE PRACTITIONER

## 2023-01-01 PROCEDURE — 1160F RVW MEDS BY RX/DR IN RCRD: CPT | Mod: CPTII,S$GLB,, | Performed by: NURSE PRACTITIONER

## 2023-01-01 PROCEDURE — 3078F PR MOST RECENT DIASTOLIC BLOOD PRESSURE < 80 MM HG: ICD-10-PCS | Mod: CPTII,S$GLB,, | Performed by: NURSE PRACTITIONER

## 2023-01-01 PROCEDURE — 52310 PR CYSTOSCOPY,REMV CALCULUS,SIMPLE: ICD-10-PCS | Mod: GC,S$GLB,, | Performed by: STUDENT IN AN ORGANIZED HEALTH CARE EDUCATION/TRAINING PROGRAM

## 2023-01-01 PROCEDURE — 80076 HEPATIC FUNCTION PANEL: CPT | Performed by: INTERNAL MEDICINE

## 2023-01-01 PROCEDURE — 3078F DIAST BP <80 MM HG: CPT | Mod: CPTII,S$GLB,, | Performed by: NURSE PRACTITIONER

## 2023-01-01 PROCEDURE — 99999 PR PBB SHADOW E&M-EST. PATIENT-LVL IV: CPT | Mod: PBBFAC,,,

## 2023-01-01 PROCEDURE — 37000008 HC ANESTHESIA 1ST 15 MINUTES: Performed by: STUDENT IN AN ORGANIZED HEALTH CARE EDUCATION/TRAINING PROGRAM

## 2023-01-01 PROCEDURE — 36620 INSERTION CATHETER ARTERY: CPT | Mod: ,,, | Performed by: ANESTHESIOLOGY

## 2023-01-01 PROCEDURE — 36415 COLL VENOUS BLD VENIPUNCTURE: CPT | Performed by: INTERNAL MEDICINE

## 2023-01-01 PROCEDURE — 1111F PR DISCHARGE MEDS RECONCILED W/ CURRENT OUTPATIENT MED LIST: ICD-10-PCS | Mod: CPTII,S$GLB,, | Performed by: INTERNAL MEDICINE

## 2023-01-01 PROCEDURE — 99223 1ST HOSP IP/OBS HIGH 75: CPT | Mod: ,,, | Performed by: INTERNAL MEDICINE

## 2023-01-01 PROCEDURE — 84134 ASSAY OF PREALBUMIN: CPT | Performed by: STUDENT IN AN ORGANIZED HEALTH CARE EDUCATION/TRAINING PROGRAM

## 2023-01-01 PROCEDURE — 84466 ASSAY OF TRANSFERRIN: CPT | Performed by: STUDENT IN AN ORGANIZED HEALTH CARE EDUCATION/TRAINING PROGRAM

## 2023-01-01 PROCEDURE — C1751 CATH, INF, PER/CENT/MIDLINE: HCPCS

## 2023-01-01 PROCEDURE — 1159F MED LIST DOCD IN RCRD: CPT | Mod: CPTII,S$GLB,, | Performed by: NURSE PRACTITIONER

## 2023-01-01 PROCEDURE — 4010F ACE/ARB THERAPY RXD/TAKEN: CPT | Mod: CPTII,S$GLB,, | Performed by: INTERNAL MEDICINE

## 2023-01-01 PROCEDURE — 25500020 PHARM REV CODE 255: Performed by: STUDENT IN AN ORGANIZED HEALTH CARE EDUCATION/TRAINING PROGRAM

## 2023-01-01 PROCEDURE — 99223 PR INITIAL HOSPITAL CARE,LEVL III: ICD-10-PCS | Mod: GC,,, | Performed by: INTERNAL MEDICINE

## 2023-01-01 PROCEDURE — 63600175 PHARM REV CODE 636 W HCPCS: Performed by: EMERGENCY MEDICINE

## 2023-01-01 PROCEDURE — 36000709 HC OR TIME LEV III EA ADD 15 MIN: Performed by: STUDENT IN AN ORGANIZED HEALTH CARE EDUCATION/TRAINING PROGRAM

## 2023-01-01 PROCEDURE — C2617 STENT, NON-COR, TEM W/O DEL: HCPCS | Performed by: STUDENT IN AN ORGANIZED HEALTH CARE EDUCATION/TRAINING PROGRAM

## 2023-01-01 PROCEDURE — 99214 PR OFFICE/OUTPT VISIT, EST, LEVL IV, 30-39 MIN: ICD-10-PCS | Mod: S$GLB,,, | Performed by: UROLOGY

## 2023-01-01 PROCEDURE — 99205 OFFICE O/P NEW HI 60 MIN: CPT | Mod: S$GLB,,, | Performed by: SURGERY

## 2023-01-01 PROCEDURE — 99999 PR PBB SHADOW E&M-EST. PATIENT-LVL II: CPT | Mod: PBBFAC,,, | Performed by: STUDENT IN AN ORGANIZED HEALTH CARE EDUCATION/TRAINING PROGRAM

## 2023-01-01 PROCEDURE — 99223 PR INITIAL HOSPITAL CARE,LEVL III: ICD-10-PCS | Mod: ,,, | Performed by: SURGERY

## 2023-01-01 PROCEDURE — 36620 PR INSERT CATH,ART,PERCUT,SHORTTERM: ICD-10-PCS | Mod: ,,, | Performed by: ANESTHESIOLOGY

## 2023-01-01 PROCEDURE — 3008F PR BODY MASS INDEX (BMI) DOCUMENTED: ICD-10-PCS | Mod: CPTII,S$GLB,, | Performed by: UROLOGY

## 2023-01-01 PROCEDURE — 11043 PR DEBRIDEMENT, SKIN, SUB-Q TISSUE,MUSCLE,=<20 SQ CM: ICD-10-PCS | Mod: ,,, | Performed by: SURGERY

## 2023-01-01 PROCEDURE — 3288F PR FALLS RISK ASSESSMENT DOCUMENTED: ICD-10-PCS | Mod: CPTII,S$GLB,, | Performed by: SURGERY

## 2023-01-01 PROCEDURE — 82570 ASSAY OF URINE CREATININE: CPT | Performed by: STUDENT IN AN ORGANIZED HEALTH CARE EDUCATION/TRAINING PROGRAM

## 2023-01-01 PROCEDURE — 74420 UROGRAPHY RTRGR +-KUB: CPT | Mod: 26,,, | Performed by: UROLOGY

## 2023-01-01 PROCEDURE — 84540 ASSAY OF URINE/UREA-N: CPT | Performed by: STUDENT IN AN ORGANIZED HEALTH CARE EDUCATION/TRAINING PROGRAM

## 2023-01-01 PROCEDURE — 36620 INSERTION CATHETER ARTERY: CPT | Mod: 59,GC,, | Performed by: INTERNAL MEDICINE

## 2023-01-01 PROCEDURE — 84484 ASSAY OF TROPONIN QUANT: CPT | Performed by: STUDENT IN AN ORGANIZED HEALTH CARE EDUCATION/TRAINING PROGRAM

## 2023-01-01 PROCEDURE — 96372 THER/PROPH/DIAG INJ SC/IM: CPT

## 2023-01-01 PROCEDURE — 36600 WITHDRAWAL OF ARTERIAL BLOOD: CPT

## 2023-01-01 PROCEDURE — 99999 PR PBB SHADOW E&M-EST. PATIENT-LVL III: CPT | Mod: PBBFAC,,, | Performed by: STUDENT IN AN ORGANIZED HEALTH CARE EDUCATION/TRAINING PROGRAM

## 2023-01-01 PROCEDURE — 25000003 PHARM REV CODE 250: Performed by: PHYSICIAN ASSISTANT

## 2023-01-01 PROCEDURE — 3078F PR MOST RECENT DIASTOLIC BLOOD PRESSURE < 80 MM HG: ICD-10-PCS | Mod: CPTII,S$GLB,, | Performed by: UROLOGY

## 2023-01-01 PROCEDURE — 4010F PR ACE/ARB THEARPY RXD/TAKEN: ICD-10-PCS | Mod: CPTII,S$GLB,, | Performed by: NURSE PRACTITIONER

## 2023-01-01 PROCEDURE — 4010F ACE/ARB THERAPY RXD/TAKEN: CPT | Mod: CPTII,S$GLB,, | Performed by: SURGERY

## 2023-01-01 PROCEDURE — 83935 ASSAY OF URINE OSMOLALITY: CPT | Performed by: STUDENT IN AN ORGANIZED HEALTH CARE EDUCATION/TRAINING PROGRAM

## 2023-01-01 PROCEDURE — 99285 EMERGENCY DEPT VISIT HI MDM: CPT | Mod: 25

## 2023-01-01 PROCEDURE — 99999 PR PBB SHADOW E&M-EST. PATIENT-LVL III: CPT | Mod: PBBFAC,,, | Performed by: NURSE PRACTITIONER

## 2023-01-01 PROCEDURE — 99999 PR PBB SHADOW E&M-EST. PATIENT-LVL V: ICD-10-PCS | Mod: PBBFAC,,, | Performed by: INTERNAL MEDICINE

## 2023-01-01 PROCEDURE — 84133 ASSAY OF URINE POTASSIUM: CPT

## 2023-01-01 PROCEDURE — 86920 COMPATIBILITY TEST SPIN: CPT | Performed by: STUDENT IN AN ORGANIZED HEALTH CARE EDUCATION/TRAINING PROGRAM

## 2023-01-01 PROCEDURE — 99024 PR POST-OP FOLLOW-UP VISIT: ICD-10-PCS | Mod: S$GLB,,, | Performed by: CLINICAL NURSE SPECIALIST

## 2023-01-01 PROCEDURE — 25000003 PHARM REV CODE 250: Performed by: NURSE ANESTHETIST, CERTIFIED REGISTERED

## 2023-01-01 PROCEDURE — 1160F PR REVIEW ALL MEDS BY PRESCRIBER/CLIN PHARMACIST DOCUMENTED: ICD-10-PCS | Mod: CPTII,S$GLB,, | Performed by: NURSE PRACTITIONER

## 2023-01-01 PROCEDURE — 97164 PT RE-EVAL EST PLAN CARE: CPT

## 2023-01-01 PROCEDURE — 4010F PR ACE/ARB THEARPY RXD/TAKEN: ICD-10-PCS | Mod: CPTII,S$GLB,, | Performed by: STUDENT IN AN ORGANIZED HEALTH CARE EDUCATION/TRAINING PROGRAM

## 2023-01-01 PROCEDURE — 93295 DEV INTERROG REMOTE 1/2/MLT: CPT | Mod: S$GLB,,, | Performed by: INTERNAL MEDICINE

## 2023-01-01 PROCEDURE — 99999 PR PBB SHADOW E&M-EST. PATIENT-LVL III: ICD-10-PCS | Mod: PBBFAC,,, | Performed by: STUDENT IN AN ORGANIZED HEALTH CARE EDUCATION/TRAINING PROGRAM

## 2023-01-01 PROCEDURE — 88307 TISSUE EXAM BY PATHOLOGIST: CPT | Performed by: PATHOLOGY

## 2023-01-01 PROCEDURE — 94799 UNLISTED PULMONARY SVC/PX: CPT

## 2023-01-01 PROCEDURE — 44640 PR REPAIR BOWEL-SKIN FISTULA: ICD-10-PCS | Mod: 22,,, | Performed by: STUDENT IN AN ORGANIZED HEALTH CARE EDUCATION/TRAINING PROGRAM

## 2023-01-01 PROCEDURE — 82436 ASSAY OF URINE CHLORIDE: CPT

## 2023-01-01 PROCEDURE — 82436 ASSAY OF URINE CHLORIDE: CPT | Performed by: STUDENT IN AN ORGANIZED HEALTH CARE EDUCATION/TRAINING PROGRAM

## 2023-01-01 PROCEDURE — 99222 PR INITIAL HOSPITAL CARE,LEVL II: ICD-10-PCS | Mod: ,,, | Performed by: STUDENT IN AN ORGANIZED HEALTH CARE EDUCATION/TRAINING PROGRAM

## 2023-01-01 PROCEDURE — 1157F ADVNC CARE PLAN IN RCRD: CPT | Mod: CPTII,S$GLB,, | Performed by: CLINICAL NURSE SPECIALIST

## 2023-01-01 PROCEDURE — 99214 OFFICE O/P EST MOD 30 MIN: CPT | Mod: S$GLB,,, | Performed by: UROLOGY

## 2023-01-01 PROCEDURE — 93296 REM INTERROG EVL PM/IDS: CPT | Mod: S$GLB,,, | Performed by: INTERNAL MEDICINE

## 2023-01-01 PROCEDURE — 1160F PR REVIEW ALL MEDS BY PRESCRIBER/CLIN PHARMACIST DOCUMENTED: ICD-10-PCS | Mod: CPTII,S$GLB,, | Performed by: STUDENT IN AN ORGANIZED HEALTH CARE EDUCATION/TRAINING PROGRAM

## 2023-01-01 PROCEDURE — 3288F PR FALLS RISK ASSESSMENT DOCUMENTED: ICD-10-PCS | Mod: CPTII,S$GLB,, | Performed by: NURSE PRACTITIONER

## 2023-01-01 PROCEDURE — 85025 COMPLETE CBC W/AUTO DIFF WBC: CPT | Performed by: EMERGENCY MEDICINE

## 2023-01-01 PROCEDURE — C1769 GUIDE WIRE: HCPCS | Performed by: STUDENT IN AN ORGANIZED HEALTH CARE EDUCATION/TRAINING PROGRAM

## 2023-01-01 PROCEDURE — 82728 ASSAY OF FERRITIN: CPT | Performed by: STUDENT IN AN ORGANIZED HEALTH CARE EDUCATION/TRAINING PROGRAM

## 2023-01-01 PROCEDURE — 87086 URINE CULTURE/COLONY COUNT: CPT

## 2023-01-01 PROCEDURE — 99999 PR PBB SHADOW E&M-EST. PATIENT-LVL IV: ICD-10-PCS | Mod: PBBFAC,,, | Performed by: CLINICAL NURSE SPECIALIST

## 2023-01-01 PROCEDURE — 99291 PR CRITICAL CARE, E/M 30-74 MINUTES: ICD-10-PCS | Mod: ,,, | Performed by: INTERNAL MEDICINE

## 2023-01-01 PROCEDURE — 99214 PR OFFICE/OUTPT VISIT, EST, LEVL IV, 30-39 MIN: ICD-10-PCS | Mod: S$GLB,,, | Performed by: INTERNAL MEDICINE

## 2023-01-01 PROCEDURE — 99232 PR SUBSEQUENT HOSPITAL CARE,LEVL II: ICD-10-PCS | Mod: ,,, | Performed by: HOSPITALIST

## 2023-01-01 PROCEDURE — 99232 SBSQ HOSP IP/OBS MODERATE 35: CPT | Mod: ,,, | Performed by: HOSPITALIST

## 2023-01-01 PROCEDURE — 80048 BASIC METABOLIC PNL TOTAL CA: CPT | Mod: 91

## 2023-01-01 PROCEDURE — S0030 INJECTION, METRONIDAZOLE: HCPCS | Performed by: NURSE PRACTITIONER

## 2023-01-01 PROCEDURE — 88307 PR  SURG PATH,LEVEL V: ICD-10-PCS | Mod: 26,,, | Performed by: PATHOLOGY

## 2023-01-01 PROCEDURE — 36415 COLL VENOUS BLD VENIPUNCTURE: CPT

## 2023-01-01 PROCEDURE — 44346 REVISION OF COLOSTOMY: CPT | Mod: 51,,, | Performed by: STUDENT IN AN ORGANIZED HEALTH CARE EDUCATION/TRAINING PROGRAM

## 2023-01-01 PROCEDURE — 96375 TX/PRO/DX INJ NEW DRUG ADDON: CPT

## 2023-01-01 PROCEDURE — 83735 ASSAY OF MAGNESIUM: CPT | Mod: 91

## 2023-01-01 PROCEDURE — 99233 PR SUBSEQUENT HOSPITAL CARE,LEVL III: ICD-10-PCS | Mod: ,,, | Performed by: INTERNAL MEDICINE

## 2023-01-01 PROCEDURE — 31500 INSERT EMERGENCY AIRWAY: CPT | Mod: GC,,, | Performed by: INTERNAL MEDICINE

## 2023-01-01 PROCEDURE — 1157F ADVNC CARE PLAN IN RCRD: CPT | Mod: CPTII,S$GLB,, | Performed by: UROLOGY

## 2023-01-01 PROCEDURE — 3074F SYST BP LT 130 MM HG: CPT | Mod: CPTII,S$GLB,, | Performed by: SURGERY

## 2023-01-01 PROCEDURE — 37000009 HC ANESTHESIA EA ADD 15 MINS: Performed by: STUDENT IN AN ORGANIZED HEALTH CARE EDUCATION/TRAINING PROGRAM

## 2023-01-01 PROCEDURE — 1159F MED LIST DOCD IN RCRD: CPT | Mod: CPTII,95,, | Performed by: INTERNAL MEDICINE

## 2023-01-01 PROCEDURE — 99233 SBSQ HOSP IP/OBS HIGH 50: CPT | Mod: 95,GC,, | Performed by: STUDENT IN AN ORGANIZED HEALTH CARE EDUCATION/TRAINING PROGRAM

## 2023-01-01 PROCEDURE — 3008F PR BODY MASS INDEX (BMI) DOCUMENTED: ICD-10-PCS | Mod: CPTII,S$GLB,, | Performed by: NURSE PRACTITIONER

## 2023-01-01 PROCEDURE — 97161 PT EVAL LOW COMPLEX 20 MIN: CPT

## 2023-01-01 PROCEDURE — 63600175 PHARM REV CODE 636 W HCPCS: Performed by: NURSE PRACTITIONER

## 2023-01-01 PROCEDURE — 99223 PR INITIAL HOSPITAL CARE,LEVL III: ICD-10-PCS | Mod: ,,, | Performed by: INTERNAL MEDICINE

## 2023-01-01 PROCEDURE — 1160F RVW MEDS BY RX/DR IN RCRD: CPT | Mod: CPTII,S$GLB,, | Performed by: UROLOGY

## 2023-01-01 PROCEDURE — 94002 VENT MGMT INPAT INIT DAY: CPT

## 2023-01-01 PROCEDURE — 93296 PR INTERROG EVAL, REMOTE, UP TO 90 DAYS, PACER/DEFIB,TECH REVIEW: ICD-10-PCS | Mod: S$GLB,,, | Performed by: INTERNAL MEDICINE

## 2023-01-01 PROCEDURE — 25000242 PHARM REV CODE 250 ALT 637 W/ HCPCS: Performed by: INTERNAL MEDICINE

## 2023-01-01 PROCEDURE — 27201037 HC PRESSURE MONITORING SET UP

## 2023-01-01 PROCEDURE — 99223 PR INITIAL HOSPITAL CARE,LEVL III: ICD-10-PCS | Mod: GC,,, | Performed by: HOSPITALIST

## 2023-01-01 PROCEDURE — 4010F ACE/ARB THERAPY RXD/TAKEN: CPT | Mod: CPTII,S$GLB,, | Performed by: CLINICAL NURSE SPECIALIST

## 2023-01-01 PROCEDURE — 1160F RVW MEDS BY RX/DR IN RCRD: CPT | Mod: CPTII,S$GLB,, | Performed by: CLINICAL NURSE SPECIALIST

## 2023-01-01 PROCEDURE — 3008F PR BODY MASS INDEX (BMI) DOCUMENTED: ICD-10-PCS | Mod: CPTII,S$GLB,, | Performed by: SURGERY

## 2023-01-01 PROCEDURE — 3078F DIAST BP <80 MM HG: CPT | Mod: CPTII,S$GLB,, | Performed by: UROLOGY

## 2023-01-01 PROCEDURE — 74420 PR  X-RAY RETROGRADE PYELOGRAM: ICD-10-PCS | Mod: 26,,, | Performed by: UROLOGY

## 2023-01-01 PROCEDURE — 1159F PR MEDICATION LIST DOCUMENTED IN MEDICAL RECORD: ICD-10-PCS | Mod: CPTII,S$GLB,, | Performed by: STUDENT IN AN ORGANIZED HEALTH CARE EDUCATION/TRAINING PROGRAM

## 2023-01-01 PROCEDURE — 25000003 PHARM REV CODE 250: Performed by: NURSE PRACTITIONER

## 2023-01-01 PROCEDURE — 81001 URINALYSIS AUTO W/SCOPE: CPT

## 2023-01-01 PROCEDURE — 99024 PR POST-OP FOLLOW-UP VISIT: ICD-10-PCS | Mod: S$GLB,,, | Performed by: NURSE PRACTITIONER

## 2023-01-01 PROCEDURE — 3074F SYST BP LT 130 MM HG: CPT | Mod: CPTII,S$GLB,, | Performed by: NURSE PRACTITIONER

## 2023-01-01 PROCEDURE — 84100 ASSAY OF PHOSPHORUS: CPT | Performed by: EMERGENCY MEDICINE

## 2023-01-01 PROCEDURE — 80053 COMPREHEN METABOLIC PANEL: CPT | Performed by: EMERGENCY MEDICINE

## 2023-01-01 PROCEDURE — 99223 1ST HOSP IP/OBS HIGH 75: CPT | Mod: GC,,, | Performed by: HOSPITALIST

## 2023-01-01 PROCEDURE — 1101F PT FALLS ASSESS-DOCD LE1/YR: CPT | Mod: CPTII,S$GLB,, | Performed by: INTERNAL MEDICINE

## 2023-01-01 PROCEDURE — 3074F SYST BP LT 130 MM HG: CPT | Mod: CPTII,S$GLB,, | Performed by: INTERNAL MEDICINE

## 2023-01-01 PROCEDURE — 81001 URINALYSIS AUTO W/SCOPE: CPT | Performed by: STUDENT IN AN ORGANIZED HEALTH CARE EDUCATION/TRAINING PROGRAM

## 2023-01-01 PROCEDURE — 99291 CRITICAL CARE FIRST HOUR: CPT | Mod: ,,, | Performed by: INTERNAL MEDICINE

## 2023-01-01 PROCEDURE — 99222 1ST HOSP IP/OBS MODERATE 55: CPT | Mod: ,,, | Performed by: STUDENT IN AN ORGANIZED HEALTH CARE EDUCATION/TRAINING PROGRAM

## 2023-01-01 PROCEDURE — 99999 PR PBB SHADOW E&M-EST. PATIENT-LVL III: ICD-10-PCS | Mod: PBBFAC,,, | Performed by: CLINICAL NURSE SPECIALIST

## 2023-01-01 PROCEDURE — 1125F AMNT PAIN NOTED PAIN PRSNT: CPT | Mod: CPTII,S$GLB,, | Performed by: UROLOGY

## 2023-01-01 PROCEDURE — 85610 PROTHROMBIN TIME: CPT

## 2023-01-01 PROCEDURE — 63600175 PHARM REV CODE 636 W HCPCS: Mod: TB,JG | Performed by: STUDENT IN AN ORGANIZED HEALTH CARE EDUCATION/TRAINING PROGRAM

## 2023-01-01 PROCEDURE — 99999 PR PBB SHADOW E&M-EST. PATIENT-LVL III: ICD-10-PCS | Mod: PBBFAC,,, | Performed by: NURSE PRACTITIONER

## 2023-01-01 PROCEDURE — 99999 PR PBB SHADOW E&M-EST. PATIENT-LVL III: ICD-10-PCS | Mod: PBBFAC,,, | Performed by: INTERNAL MEDICINE

## 2023-01-01 PROCEDURE — 1101F PT FALLS ASSESS-DOCD LE1/YR: CPT | Mod: CPTII,S$GLB,, | Performed by: SURGERY

## 2023-01-01 PROCEDURE — 1160F RVW MEDS BY RX/DR IN RCRD: CPT | Mod: CPTII,S$GLB,, | Performed by: STUDENT IN AN ORGANIZED HEALTH CARE EDUCATION/TRAINING PROGRAM

## 2023-01-01 PROCEDURE — 99999 PR PBB SHADOW E&M-EST. PATIENT-LVL III: CPT | Mod: PBBFAC,,, | Performed by: INTERNAL MEDICINE

## 2023-01-01 PROCEDURE — 3008F BODY MASS INDEX DOCD: CPT | Mod: CPTII,S$GLB,, | Performed by: INTERNAL MEDICINE

## 2023-01-01 PROCEDURE — 80069 RENAL FUNCTION PANEL: CPT | Performed by: STUDENT IN AN ORGANIZED HEALTH CARE EDUCATION/TRAINING PROGRAM

## 2023-01-01 PROCEDURE — 99223 1ST HOSP IP/OBS HIGH 75: CPT | Mod: ,,, | Performed by: SURGERY

## 2023-01-01 PROCEDURE — 99232 SBSQ HOSP IP/OBS MODERATE 35: CPT | Mod: ,,, | Performed by: STUDENT IN AN ORGANIZED HEALTH CARE EDUCATION/TRAINING PROGRAM

## 2023-01-01 PROCEDURE — 36556 INSERT NON-TUNNEL CV CATH: CPT

## 2023-01-01 PROCEDURE — 99233 PR SUBSEQUENT HOSPITAL CARE,LEVL III: ICD-10-PCS | Mod: ,,, | Performed by: HOSPITALIST

## 2023-01-01 PROCEDURE — 1101F PR PT FALLS ASSESS DOC 0-1 FALLS W/OUT INJ PAST YR: ICD-10-PCS | Mod: CPTII,S$GLB,, | Performed by: UROLOGY

## 2023-01-01 PROCEDURE — 83605 ASSAY OF LACTIC ACID: CPT

## 2023-01-01 PROCEDURE — 4010F ACE/ARB THERAPY RXD/TAKEN: CPT | Mod: CPTII,S$GLB,, | Performed by: NURSE PRACTITIONER

## 2023-01-01 PROCEDURE — 4010F PR ACE/ARB THEARPY RXD/TAKEN: ICD-10-PCS | Mod: CPTII,S$GLB,, | Performed by: SURGERY

## 2023-01-01 PROCEDURE — 44640 REPAIR BOWEL-SKIN FISTULA: CPT | Mod: 22,,, | Performed by: STUDENT IN AN ORGANIZED HEALTH CARE EDUCATION/TRAINING PROGRAM

## 2023-01-01 PROCEDURE — 99215 PR OFFICE/OUTPT VISIT, EST, LEVL V, 40-54 MIN: ICD-10-PCS | Mod: S$GLB,,, | Performed by: INTERNAL MEDICINE

## 2023-01-01 PROCEDURE — 4010F PR ACE/ARB THEARPY RXD/TAKEN: ICD-10-PCS | Mod: CPTII,95,, | Performed by: INTERNAL MEDICINE

## 2023-01-01 PROCEDURE — 83735 ASSAY OF MAGNESIUM: CPT | Mod: 91 | Performed by: STUDENT IN AN ORGANIZED HEALTH CARE EDUCATION/TRAINING PROGRAM

## 2023-01-01 PROCEDURE — 11046 PR DEB MUSC/FASCIA ADD-ON: ICD-10-PCS | Mod: ,,, | Performed by: SURGERY

## 2023-01-01 PROCEDURE — 82803 BLOOD GASES ANY COMBINATION: CPT

## 2023-01-01 PROCEDURE — 1157F PR ADVANCE CARE PLAN OR EQUIV PRESENT IN MEDICAL RECORD: ICD-10-PCS | Mod: CPTII,S$GLB,, | Performed by: NURSE PRACTITIONER

## 2023-01-01 PROCEDURE — 63600175 PHARM REV CODE 636 W HCPCS: Performed by: NURSE ANESTHETIST, CERTIFIED REGISTERED

## 2023-01-01 PROCEDURE — 1157F PR ADVANCE CARE PLAN OR EQUIV PRESENT IN MEDICAL RECORD: ICD-10-PCS | Mod: CPTII,95,, | Performed by: INTERNAL MEDICINE

## 2023-01-01 PROCEDURE — 84484 ASSAY OF TROPONIN QUANT: CPT

## 2023-01-01 PROCEDURE — 84560 ASSAY OF URINE/URIC ACID: CPT

## 2023-01-01 PROCEDURE — 37799 UNLISTED PX VASCULAR SURGERY: CPT

## 2023-01-01 PROCEDURE — 84295 ASSAY OF SERUM SODIUM: CPT | Mod: 91 | Performed by: STUDENT IN AN ORGANIZED HEALTH CARE EDUCATION/TRAINING PROGRAM

## 2023-01-01 PROCEDURE — 1159F MED LIST DOCD IN RCRD: CPT | Mod: CPTII,S$GLB,, | Performed by: UROLOGY

## 2023-01-01 PROCEDURE — C1765 ADHESION BARRIER: HCPCS | Performed by: STUDENT IN AN ORGANIZED HEALTH CARE EDUCATION/TRAINING PROGRAM

## 2023-01-01 PROCEDURE — 36620 INSERTION CATHETER ARTERY: CPT

## 2023-01-01 PROCEDURE — 99223 1ST HOSP IP/OBS HIGH 75: CPT | Mod: ,,, | Performed by: STUDENT IN AN ORGANIZED HEALTH CARE EDUCATION/TRAINING PROGRAM

## 2023-01-01 PROCEDURE — 82330 ASSAY OF CALCIUM: CPT | Performed by: STUDENT IN AN ORGANIZED HEALTH CARE EDUCATION/TRAINING PROGRAM

## 2023-01-01 PROCEDURE — 99233 PR SUBSEQUENT HOSPITAL CARE,LEVL III: ICD-10-PCS | Mod: 95,,, | Performed by: STUDENT IN AN ORGANIZED HEALTH CARE EDUCATION/TRAINING PROGRAM

## 2023-01-01 PROCEDURE — 96361 HYDRATE IV INFUSION ADD-ON: CPT

## 2023-01-01 PROCEDURE — 1160F PR REVIEW ALL MEDS BY PRESCRIBER/CLIN PHARMACIST DOCUMENTED: ICD-10-PCS | Mod: CPTII,95,, | Performed by: INTERNAL MEDICINE

## 2023-01-01 PROCEDURE — 63600175 PHARM REV CODE 636 W HCPCS: Performed by: PHYSICIAN ASSISTANT

## 2023-01-01 PROCEDURE — 1160F PR REVIEW ALL MEDS BY PRESCRIBER/CLIN PHARMACIST DOCUMENTED: ICD-10-PCS | Mod: CPTII,S$GLB,, | Performed by: UROLOGY

## 2023-01-01 PROCEDURE — 4010F ACE/ARB THERAPY RXD/TAKEN: CPT | Mod: CPTII,S$GLB,, | Performed by: UROLOGY

## 2023-01-01 PROCEDURE — 1159F PR MEDICATION LIST DOCUMENTED IN MEDICAL RECORD: ICD-10-PCS | Mod: CPTII,S$GLB,, | Performed by: UROLOGY

## 2023-01-01 PROCEDURE — 1157F ADVNC CARE PLAN IN RCRD: CPT | Mod: CPTII,95,, | Performed by: INTERNAL MEDICINE

## 2023-01-01 PROCEDURE — 44346: ICD-10-PCS | Mod: 51,,, | Performed by: STUDENT IN AN ORGANIZED HEALTH CARE EDUCATION/TRAINING PROGRAM

## 2023-01-01 PROCEDURE — 1157F PR ADVANCE CARE PLAN OR EQUIV PRESENT IN MEDICAL RECORD: ICD-10-PCS | Mod: CPTII,S$GLB,, | Performed by: STUDENT IN AN ORGANIZED HEALTH CARE EDUCATION/TRAINING PROGRAM

## 2023-01-01 PROCEDURE — 97116 GAIT TRAINING THERAPY: CPT | Mod: CQ

## 2023-01-01 PROCEDURE — 84550 ASSAY OF BLOOD/URIC ACID: CPT | Performed by: STUDENT IN AN ORGANIZED HEALTH CARE EDUCATION/TRAINING PROGRAM

## 2023-01-01 PROCEDURE — C1758 CATHETER, URETERAL: HCPCS | Performed by: STUDENT IN AN ORGANIZED HEALTH CARE EDUCATION/TRAINING PROGRAM

## 2023-01-01 PROCEDURE — 1159F PR MEDICATION LIST DOCUMENTED IN MEDICAL RECORD: ICD-10-PCS | Mod: CPTII,S$GLB,, | Performed by: NURSE PRACTITIONER

## 2023-01-01 PROCEDURE — D9220A PRA ANESTHESIA: Mod: CRNA,,, | Performed by: NURSE ANESTHETIST, CERTIFIED REGISTERED

## 2023-01-01 PROCEDURE — 1111F PR DISCHARGE MEDS RECONCILED W/ CURRENT OUTPATIENT MED LIST: ICD-10-PCS | Mod: CPTII,S$GLB,, | Performed by: UROLOGY

## 2023-01-01 PROCEDURE — 80048 BASIC METABOLIC PNL TOTAL CA: CPT | Mod: 91,XB | Performed by: INTERNAL MEDICINE

## 2023-01-01 PROCEDURE — 99223 1ST HOSP IP/OBS HIGH 75: CPT | Mod: ,,,

## 2023-01-01 PROCEDURE — 27000249 HC VAPOTHERM CIRCUIT

## 2023-01-01 PROCEDURE — 97165 OT EVAL LOW COMPLEX 30 MIN: CPT

## 2023-01-01 PROCEDURE — D9220A PRA ANESTHESIA: Mod: ANES,,, | Performed by: ANESTHESIOLOGY

## 2023-01-01 PROCEDURE — 99024 POSTOP FOLLOW-UP VISIT: CPT | Mod: S$GLB,,, | Performed by: NURSE PRACTITIONER

## 2023-01-01 PROCEDURE — 88307 TISSUE EXAM BY PATHOLOGIST: CPT | Mod: 26,,, | Performed by: PATHOLOGY

## 2023-01-01 PROCEDURE — 27100171 HC OXYGEN HIGH FLOW UP TO 24 HOURS

## 2023-01-01 PROCEDURE — 99233 SBSQ HOSP IP/OBS HIGH 50: CPT | Mod: 95,,, | Performed by: STUDENT IN AN ORGANIZED HEALTH CARE EDUCATION/TRAINING PROGRAM

## 2023-01-01 PROCEDURE — 99443 PR PHYSICIAN TELEPHONE EVALUATION 21-30 MIN: CPT | Mod: 95,,, | Performed by: INTERNAL MEDICINE

## 2023-01-01 PROCEDURE — 1157F PR ADVANCE CARE PLAN OR EQUIV PRESENT IN MEDICAL RECORD: ICD-10-PCS | Mod: CPTII,S$GLB,, | Performed by: UROLOGY

## 2023-01-01 PROCEDURE — 83880 ASSAY OF NATRIURETIC PEPTIDE: CPT | Performed by: EMERGENCY MEDICINE

## 2023-01-01 PROCEDURE — 94644 CONT INHLJ TX 1ST HOUR: CPT

## 2023-01-01 PROCEDURE — 1101F PT FALLS ASSESS-DOCD LE1/YR: CPT | Mod: CPTII,S$GLB,, | Performed by: NURSE PRACTITIONER

## 2023-01-01 PROCEDURE — 3288F FALL RISK ASSESSMENT DOCD: CPT | Mod: CPTII,S$GLB,, | Performed by: NURSE PRACTITIONER

## 2023-01-01 PROCEDURE — 84300 ASSAY OF URINE SODIUM: CPT

## 2023-01-01 PROCEDURE — 36556 PR INSERT NON-TUNNEL CV CATH 5+ YRS OLD: ICD-10-PCS | Mod: 59,GC,, | Performed by: INTERNAL MEDICINE

## 2023-01-01 PROCEDURE — 96365 THER/PROPH/DIAG IV INF INIT: CPT

## 2023-01-01 PROCEDURE — 99499 UNLISTED E&M SERVICE: CPT | Mod: S$GLB,,, | Performed by: SURGERY

## 2023-01-01 DEVICE — BARRIER SEPRAFILM ADHESION: Type: IMPLANTABLE DEVICE | Site: ABDOMEN | Status: FUNCTIONAL

## 2023-01-01 DEVICE — STENT URETERAL UNIV 6FR 24CM: Type: IMPLANTABLE DEVICE | Site: URETER | Status: FUNCTIONAL

## 2023-01-01 RX ORDER — ALPRAZOLAM 0.5 MG/1
0.5 TABLET ORAL NIGHTLY PRN
Status: DISCONTINUED | OUTPATIENT
Start: 2023-01-01 | End: 2023-01-01

## 2023-01-01 RX ORDER — SODIUM CHLORIDE 9 MG/ML
INJECTION, SOLUTION INTRAVENOUS
Status: DISCONTINUED | OUTPATIENT
Start: 2023-01-01 | End: 2023-01-01

## 2023-01-01 RX ORDER — TRIPROLIDINE/PSEUDOEPHEDRINE 2.5MG-60MG
600 TABLET ORAL
Status: DISCONTINUED | OUTPATIENT
Start: 2023-01-01 | End: 2023-01-01

## 2023-01-01 RX ORDER — ROCURONIUM BROMIDE 10 MG/ML
1 INJECTION, SOLUTION INTRAVENOUS ONCE
Status: COMPLETED | OUTPATIENT
Start: 2023-01-01 | End: 2023-01-01

## 2023-01-01 RX ORDER — DICLOFENAC SODIUM 10 MG/G
GEL TOPICAL 4 TIMES DAILY
COMMUNITY
Start: 2023-01-01

## 2023-01-01 RX ORDER — DOBUTAMINE HYDROCHLORIDE 400 MG/100ML
5 INJECTION INTRAVENOUS CONTINUOUS
Status: DISCONTINUED | OUTPATIENT
Start: 2023-01-01 | End: 2023-01-01

## 2023-01-01 RX ORDER — HYDROCODONE BITARTRATE AND ACETAMINOPHEN 5; 325 MG/1; MG/1
1 TABLET ORAL EVERY 6 HOURS PRN
Qty: 30 TABLET | Refills: 0 | Status: SHIPPED | OUTPATIENT
Start: 2023-01-01

## 2023-01-01 RX ORDER — SPIRONOLACTONE 25 MG/1
12.5 TABLET ORAL DAILY
Qty: 90 TABLET | Refills: 3 | Status: ON HOLD | OUTPATIENT
Start: 2023-01-01 | End: 2023-01-01 | Stop reason: HOSPADM

## 2023-01-01 RX ORDER — MIDAZOLAM HYDROCHLORIDE 1 MG/ML
INJECTION, SOLUTION INTRAMUSCULAR; INTRAVENOUS
Status: DISCONTINUED | OUTPATIENT
Start: 2023-01-01 | End: 2023-01-01

## 2023-01-01 RX ORDER — FUROSEMIDE 10 MG/ML
40 INJECTION INTRAMUSCULAR; INTRAVENOUS 2 TIMES DAILY
Status: DISCONTINUED | OUTPATIENT
Start: 2023-01-01 | End: 2023-01-01

## 2023-01-01 RX ORDER — GLUCAGON 1 MG
1 KIT INJECTION
Status: DISCONTINUED | OUTPATIENT
Start: 2023-01-01 | End: 2023-01-01

## 2023-01-01 RX ORDER — FUROSEMIDE 40 MG/1
80 TABLET ORAL 2 TIMES DAILY
Status: DISCONTINUED | OUTPATIENT
Start: 2023-01-01 | End: 2023-01-01

## 2023-01-01 RX ORDER — OXYCODONE HYDROCHLORIDE 5 MG/1
5 TABLET ORAL EVERY 6 HOURS PRN
Qty: 30 TABLET | Refills: 0 | Status: SHIPPED | OUTPATIENT
Start: 2023-01-01 | End: 2023-01-01 | Stop reason: SDUPTHER

## 2023-01-01 RX ORDER — INDOMETHACIN 25 MG/1
100 CAPSULE ORAL ONCE
Status: COMPLETED | OUTPATIENT
Start: 2023-01-01 | End: 2023-01-01

## 2023-01-01 RX ORDER — FAMOTIDINE 10 MG/ML
20 INJECTION INTRAVENOUS DAILY
Status: DISCONTINUED | OUTPATIENT
Start: 2023-01-01 | End: 2023-01-01

## 2023-01-01 RX ORDER — ENOXAPARIN SODIUM 100 MG/ML
30 INJECTION SUBCUTANEOUS EVERY 24 HOURS
Status: DISCONTINUED | OUTPATIENT
Start: 2023-01-01 | End: 2023-01-01 | Stop reason: SDUPTHER

## 2023-01-01 RX ORDER — PROPOFOL 10 MG/ML
INJECTION, EMULSION INTRAVENOUS
Status: DISCONTINUED
Start: 2023-01-01 | End: 2023-01-01 | Stop reason: WASHOUT

## 2023-01-01 RX ORDER — TRAMADOL HYDROCHLORIDE 50 MG/1
50 TABLET ORAL EVERY 6 HOURS PRN
Qty: 30 TABLET | Refills: 0 | Status: SHIPPED | OUTPATIENT
Start: 2023-01-01 | End: 2023-01-01

## 2023-01-01 RX ORDER — SODIUM CHLORIDE 0.9 % (FLUSH) 0.9 %
10 SYRINGE (ML) INJECTION
Status: DISCONTINUED | OUTPATIENT
Start: 2023-01-01 | End: 2023-01-01

## 2023-01-01 RX ORDER — FUROSEMIDE 20 MG/1
20 TABLET ORAL DAILY
Qty: 30 TABLET | Refills: 11 | Status: SHIPPED | OUTPATIENT
Start: 2023-01-01 | End: 2024-03-03

## 2023-01-01 RX ORDER — MUPIROCIN 20 MG/G
OINTMENT TOPICAL 2 TIMES DAILY
Status: DISPENSED | OUTPATIENT
Start: 2023-01-01 | End: 2023-01-01

## 2023-01-01 RX ORDER — POTASSIUM CHLORIDE 20 MEQ/1
40 TABLET, EXTENDED RELEASE ORAL ONCE
Status: DISCONTINUED | OUTPATIENT
Start: 2023-01-01 | End: 2023-01-01

## 2023-01-01 RX ORDER — SODIUM CHLORIDE 0.9 % (FLUSH) 0.9 %
10 SYRINGE (ML) INJECTION
Status: DISCONTINUED | OUTPATIENT
Start: 2023-01-01 | End: 2023-01-01 | Stop reason: HOSPADM

## 2023-01-01 RX ORDER — HYDROCODONE BITARTRATE AND ACETAMINOPHEN 5; 325 MG/1; MG/1
1 TABLET ORAL EVERY 12 HOURS PRN
Qty: 25 TABLET | Refills: 0 | Status: SHIPPED | OUTPATIENT
Start: 2023-01-01

## 2023-01-01 RX ORDER — MAGNESIUM SULFATE HEPTAHYDRATE 40 MG/ML
2 INJECTION, SOLUTION INTRAVENOUS
Status: COMPLETED | OUTPATIENT
Start: 2023-01-01 | End: 2023-01-01

## 2023-01-01 RX ORDER — INSULIN ASPART 100 [IU]/ML
0-5 INJECTION, SOLUTION INTRAVENOUS; SUBCUTANEOUS
Status: DISCONTINUED | OUTPATIENT
Start: 2023-01-01 | End: 2023-01-01

## 2023-01-01 RX ORDER — IPRATROPIUM BROMIDE AND ALBUTEROL SULFATE 2.5; .5 MG/3ML; MG/3ML
3 SOLUTION RESPIRATORY (INHALATION) EVERY 4 HOURS PRN
Status: DISCONTINUED | OUTPATIENT
Start: 2023-01-01 | End: 2023-01-01 | Stop reason: HOSPADM

## 2023-01-01 RX ORDER — PROCHLORPERAZINE EDISYLATE 5 MG/ML
5 INJECTION INTRAMUSCULAR; INTRAVENOUS EVERY 6 HOURS PRN
Status: DISCONTINUED | OUTPATIENT
Start: 2023-01-01 | End: 2023-01-01 | Stop reason: HOSPADM

## 2023-01-01 RX ORDER — ALPRAZOLAM 0.5 MG/1
0.5 TABLET ORAL NIGHTLY PRN
Qty: 30 TABLET | Refills: 0 | Status: SHIPPED | OUTPATIENT
Start: 2023-01-01

## 2023-01-01 RX ORDER — DEXTROSE 40 %
15 GEL (GRAM) ORAL
Status: DISCONTINUED | OUTPATIENT
Start: 2023-01-01 | End: 2023-01-01

## 2023-01-01 RX ORDER — ASPIRIN 81 MG/1
81 TABLET ORAL DAILY
Status: DISCONTINUED | OUTPATIENT
Start: 2023-01-01 | End: 2023-01-01 | Stop reason: HOSPADM

## 2023-01-01 RX ORDER — ASPIRIN 81 MG/1
81 TABLET ORAL DAILY
Qty: 90 TABLET | Refills: 3 | Status: SHIPPED | OUTPATIENT
Start: 2023-01-01

## 2023-01-01 RX ORDER — CHLORHEXIDINE GLUCONATE ORAL RINSE 1.2 MG/ML
15 SOLUTION DENTAL 2 TIMES DAILY
Status: DISCONTINUED | OUTPATIENT
Start: 2023-01-01 | End: 2023-01-01

## 2023-01-01 RX ORDER — IBUPROFEN 200 MG
24 TABLET ORAL
Status: DISCONTINUED | OUTPATIENT
Start: 2023-01-01 | End: 2023-01-01

## 2023-01-01 RX ORDER — MAGNESIUM SULFATE HEPTAHYDRATE 40 MG/ML
4 INJECTION, SOLUTION INTRAVENOUS ONCE
Status: COMPLETED | OUTPATIENT
Start: 2023-01-01 | End: 2023-01-01

## 2023-01-01 RX ORDER — HALOPERIDOL 5 MG/ML
0.5 INJECTION INTRAMUSCULAR EVERY 10 MIN PRN
Status: DISCONTINUED | OUTPATIENT
Start: 2023-01-01 | End: 2023-01-01 | Stop reason: HOSPADM

## 2023-01-01 RX ORDER — CEFPODOXIME PROXETIL 100 MG/1
100 TABLET, FILM COATED ORAL EVERY 12 HOURS
Status: COMPLETED | OUTPATIENT
Start: 2023-01-01 | End: 2023-01-01

## 2023-01-01 RX ORDER — MORPHINE SULFATE 2 MG/ML
2 INJECTION, SOLUTION INTRAMUSCULAR; INTRAVENOUS
Status: DISCONTINUED | OUTPATIENT
Start: 2023-01-01 | End: 2023-01-01

## 2023-01-01 RX ORDER — EZETIMIBE 10 MG/1
10 TABLET ORAL DAILY
Status: DISCONTINUED | OUTPATIENT
Start: 2023-01-01 | End: 2023-01-01 | Stop reason: HOSPADM

## 2023-01-01 RX ORDER — FENTANYL CITRATE 50 UG/ML
INJECTION, SOLUTION INTRAMUSCULAR; INTRAVENOUS
Status: DISCONTINUED | OUTPATIENT
Start: 2023-01-01 | End: 2023-01-01

## 2023-01-01 RX ORDER — OXYBUTYNIN CHLORIDE 5 MG/1
5 TABLET ORAL 3 TIMES DAILY PRN
Qty: 90 TABLET | Refills: 11 | Status: CANCELLED | OUTPATIENT
Start: 2023-01-01 | End: 2024-03-21

## 2023-01-01 RX ORDER — OXYCODONE HYDROCHLORIDE 5 MG/1
5 TABLET ORAL EVERY 4 HOURS PRN
Status: DISCONTINUED | OUTPATIENT
Start: 2023-01-01 | End: 2023-01-01 | Stop reason: HOSPADM

## 2023-01-01 RX ORDER — ONDANSETRON 8 MG/1
8 TABLET, ORALLY DISINTEGRATING ORAL EVERY 12 HOURS PRN
Qty: 20 TABLET | Refills: 2 | Status: ON HOLD | OUTPATIENT
Start: 2023-01-01 | End: 2023-01-01 | Stop reason: HOSPADM

## 2023-01-01 RX ORDER — HEPARIN SODIUM 5000 [USP'U]/ML
5000 INJECTION, SOLUTION INTRAVENOUS; SUBCUTANEOUS EVERY 8 HOURS
Status: COMPLETED | OUTPATIENT
Start: 2023-01-01 | End: 2023-01-01

## 2023-01-01 RX ORDER — SULFAMETHOXAZOLE AND TRIMETHOPRIM 800; 160 MG/1; MG/1
1 TABLET ORAL 2 TIMES DAILY
Qty: 20 TABLET | Refills: 0 | Status: SHIPPED | OUTPATIENT
Start: 2023-01-01 | End: 2023-01-01

## 2023-01-01 RX ORDER — EZETIMIBE 10 MG/1
10 TABLET ORAL DAILY
Qty: 90 TABLET | Refills: 3 | Status: SHIPPED | OUTPATIENT
Start: 2023-01-01

## 2023-01-01 RX ORDER — FLUDROCORTISONE ACETATE 0.1 MG/1
100 TABLET ORAL DAILY
Status: DISCONTINUED | OUTPATIENT
Start: 2023-01-01 | End: 2023-01-01

## 2023-01-01 RX ORDER — OXYCODONE HYDROCHLORIDE 10 MG/1
10 TABLET ORAL EVERY 4 HOURS PRN
Status: DISCONTINUED | OUTPATIENT
Start: 2023-01-01 | End: 2023-01-01 | Stop reason: HOSPADM

## 2023-01-01 RX ORDER — CIPROFLOXACIN 500 MG/1
500 TABLET ORAL EVERY 12 HOURS
Status: DISCONTINUED | OUTPATIENT
Start: 2023-01-01 | End: 2023-01-01

## 2023-01-01 RX ORDER — LIDOCAINE HYDROCHLORIDE 10 MG/ML
1 INJECTION, SOLUTION EPIDURAL; INFILTRATION; INTRACAUDAL; PERINEURAL
Status: DISCONTINUED | OUTPATIENT
Start: 2023-01-01 | End: 2023-01-01

## 2023-01-01 RX ORDER — KETAMINE HCL IN 0.9 % NACL 50 MG/5 ML
SYRINGE (ML) INTRAVENOUS
Status: DISCONTINUED | OUTPATIENT
Start: 2023-01-01 | End: 2023-01-01

## 2023-01-01 RX ORDER — POLYETHYLENE GLYCOL 3350 17 G/17G
17 POWDER, FOR SOLUTION ORAL 2 TIMES DAILY
Status: DISCONTINUED | OUTPATIENT
Start: 2023-01-01 | End: 2023-01-01

## 2023-01-01 RX ORDER — MUPIROCIN 20 MG/G
OINTMENT TOPICAL 2 TIMES DAILY
Status: DISCONTINUED | OUTPATIENT
Start: 2023-01-01 | End: 2023-01-01

## 2023-01-01 RX ORDER — METOPROLOL SUCCINATE 25 MG/1
25 TABLET, EXTENDED RELEASE ORAL DAILY
Status: DISCONTINUED | OUTPATIENT
Start: 2023-01-01 | End: 2023-01-01

## 2023-01-01 RX ORDER — FUROSEMIDE 10 MG/ML
60 INJECTION INTRAMUSCULAR; INTRAVENOUS 2 TIMES DAILY
Status: DISCONTINUED | OUTPATIENT
Start: 2023-01-01 | End: 2023-01-01

## 2023-01-01 RX ORDER — LIDOCAINE HYDROCHLORIDE 20 MG/ML
INJECTION INTRAVENOUS
Status: DISCONTINUED | OUTPATIENT
Start: 2023-01-01 | End: 2023-01-01

## 2023-01-01 RX ORDER — HYDROCODONE BITARTRATE AND ACETAMINOPHEN 5; 325 MG/1; MG/1
1 TABLET ORAL EVERY 8 HOURS PRN
Qty: 20 TABLET | Refills: 0 | Status: ON HOLD | OUTPATIENT
Start: 2023-01-01 | End: 2023-01-01 | Stop reason: HOSPADM

## 2023-01-01 RX ORDER — FLUTICASONE PROPIONATE 50 MCG
1 SPRAY, SUSPENSION (ML) NASAL DAILY PRN
Status: DISCONTINUED | OUTPATIENT
Start: 2023-01-01 | End: 2023-01-01 | Stop reason: HOSPADM

## 2023-01-01 RX ORDER — SUCCINYLCHOLINE CHLORIDE 20 MG/ML
INJECTION INTRAMUSCULAR; INTRAVENOUS
Status: DISCONTINUED
Start: 2023-01-01 | End: 2023-01-01 | Stop reason: WASHOUT

## 2023-01-01 RX ORDER — OXYCODONE HYDROCHLORIDE 5 MG/1
5 TABLET ORAL EVERY 4 HOURS PRN
Qty: 15 TABLET | Refills: 0 | Status: SHIPPED | OUTPATIENT
Start: 2023-01-01 | End: 2023-01-01 | Stop reason: SDUPTHER

## 2023-01-01 RX ORDER — NITROGLYCERIN 0.4 MG/1
0.4 TABLET SUBLINGUAL EVERY 5 MIN PRN
Status: DISCONTINUED | OUTPATIENT
Start: 2023-01-01 | End: 2023-01-01 | Stop reason: HOSPADM

## 2023-01-01 RX ORDER — FAMOTIDINE 20 MG/1
20 TABLET, FILM COATED ORAL DAILY
Status: DISCONTINUED | OUTPATIENT
Start: 2023-01-01 | End: 2023-01-01

## 2023-01-01 RX ORDER — ETOMIDATE 2 MG/ML
0.3 INJECTION INTRAVENOUS ONCE
Status: COMPLETED | OUTPATIENT
Start: 2023-01-01 | End: 2023-01-01

## 2023-01-01 RX ORDER — OXYCODONE HYDROCHLORIDE 5 MG/1
5 TABLET ORAL EVERY 4 HOURS PRN
Status: DISCONTINUED | OUTPATIENT
Start: 2023-01-01 | End: 2023-01-01

## 2023-01-01 RX ORDER — FUROSEMIDE 10 MG/ML
80 INJECTION INTRAMUSCULAR; INTRAVENOUS ONCE
Status: COMPLETED | OUTPATIENT
Start: 2023-01-01 | End: 2023-01-01

## 2023-01-01 RX ORDER — MAGNESIUM SULFATE HEPTAHYDRATE 40 MG/ML
2 INJECTION, SOLUTION INTRAVENOUS ONCE
Status: COMPLETED | OUTPATIENT
Start: 2023-01-01 | End: 2023-01-01

## 2023-01-01 RX ORDER — FUROSEMIDE 10 MG/ML
80 INJECTION INTRAMUSCULAR; INTRAVENOUS 2 TIMES DAILY
Status: DISCONTINUED | OUTPATIENT
Start: 2023-01-01 | End: 2023-01-01

## 2023-01-01 RX ORDER — LOSARTAN POTASSIUM 25 MG/1
25 TABLET ORAL DAILY
Qty: 90 TABLET | Refills: 3 | Status: ON HOLD | OUTPATIENT
Start: 2023-01-01 | End: 2023-01-01 | Stop reason: HOSPADM

## 2023-01-01 RX ORDER — IBUPROFEN 200 MG
16 TABLET ORAL
Status: DISCONTINUED | OUTPATIENT
Start: 2023-01-01 | End: 2023-01-01

## 2023-01-01 RX ORDER — DIGOXIN 125 MCG
0.12 TABLET ORAL DAILY
Status: DISCONTINUED | OUTPATIENT
Start: 2023-01-01 | End: 2023-01-01

## 2023-01-01 RX ORDER — AMOXICILLIN 250 MG
1 CAPSULE ORAL DAILY PRN
Status: DISCONTINUED | OUTPATIENT
Start: 2023-01-01 | End: 2023-01-01

## 2023-01-01 RX ORDER — ALPRAZOLAM 0.5 MG/1
0.5 TABLET ORAL NIGHTLY PRN
Qty: 30 TABLET | Refills: 1 | Status: ON HOLD | OUTPATIENT
Start: 2023-01-01 | End: 2023-01-01 | Stop reason: SDUPTHER

## 2023-01-01 RX ORDER — POTASSIUM CHLORIDE 20 MEQ/1
40 TABLET, EXTENDED RELEASE ORAL EVERY 4 HOURS
Status: DISCONTINUED | OUTPATIENT
Start: 2023-01-01 | End: 2023-01-01

## 2023-01-01 RX ORDER — DIPHENHYDRAMINE HYDROCHLORIDE 50 MG/ML
INJECTION INTRAMUSCULAR; INTRAVENOUS
Status: DISCONTINUED | OUTPATIENT
Start: 2023-01-01 | End: 2023-01-01

## 2023-01-01 RX ORDER — CLOPIDOGREL BISULFATE 75 MG/1
75 TABLET ORAL DAILY
Status: DISCONTINUED | OUTPATIENT
Start: 2023-01-01 | End: 2023-01-01

## 2023-01-01 RX ORDER — SCOLOPAMINE TRANSDERMAL SYSTEM 1 MG/1
1 PATCH, EXTENDED RELEASE TRANSDERMAL
Status: DISCONTINUED | OUTPATIENT
Start: 2023-01-01 | End: 2023-01-01

## 2023-01-01 RX ORDER — FUROSEMIDE 20 MG/1
20 TABLET ORAL DAILY
Status: DISCONTINUED | OUTPATIENT
Start: 2023-01-01 | End: 2023-01-01 | Stop reason: HOSPADM

## 2023-01-01 RX ORDER — POTASSIUM CHLORIDE 20 MEQ/1
40 TABLET, EXTENDED RELEASE ORAL ONCE
Status: COMPLETED | OUTPATIENT
Start: 2023-01-01 | End: 2023-01-01

## 2023-01-01 RX ORDER — MAGNESIUM SULFATE 1 G/100ML
1 INJECTION INTRAVENOUS ONCE
Status: COMPLETED | OUTPATIENT
Start: 2023-01-01 | End: 2023-01-01

## 2023-01-01 RX ORDER — OXYCODONE HYDROCHLORIDE 5 MG/1
5 TABLET ORAL EVERY 6 HOURS PRN
Status: DISCONTINUED | OUTPATIENT
Start: 2023-01-01 | End: 2023-01-01

## 2023-01-01 RX ORDER — POLYETHYLENE GLYCOL 3350 17 G/17G
17 POWDER, FOR SOLUTION ORAL DAILY
Status: DISCONTINUED | OUTPATIENT
Start: 2023-01-01 | End: 2023-01-01

## 2023-01-01 RX ORDER — OXYCODONE HYDROCHLORIDE 10 MG/1
10 TABLET ORAL EVERY 6 HOURS PRN
Status: DISCONTINUED | OUTPATIENT
Start: 2023-01-01 | End: 2023-01-01

## 2023-01-01 RX ORDER — INDOMETHACIN 25 MG/1
50 CAPSULE ORAL ONCE
Status: COMPLETED | OUTPATIENT
Start: 2023-01-01 | End: 2023-01-01

## 2023-01-01 RX ORDER — FUROSEMIDE 40 MG/1
40 TABLET ORAL DAILY
Status: DISCONTINUED | OUTPATIENT
Start: 2023-01-01 | End: 2023-01-01

## 2023-01-01 RX ORDER — ONDANSETRON 2 MG/ML
4 INJECTION INTRAMUSCULAR; INTRAVENOUS EVERY 6 HOURS PRN
Status: DISCONTINUED | OUTPATIENT
Start: 2023-01-01 | End: 2023-01-01 | Stop reason: HOSPADM

## 2023-01-01 RX ORDER — SODIUM,POTASSIUM PHOSPHATES 280-250MG
2 POWDER IN PACKET (EA) ORAL ONCE
Status: COMPLETED | OUTPATIENT
Start: 2023-01-01 | End: 2023-01-01

## 2023-01-01 RX ORDER — LORAZEPAM 2 MG/ML
1 INJECTION INTRAMUSCULAR
Status: DISCONTINUED | OUTPATIENT
Start: 2023-01-01 | End: 2023-01-01 | Stop reason: HOSPADM

## 2023-01-01 RX ORDER — ENOXAPARIN SODIUM 100 MG/ML
40 INJECTION SUBCUTANEOUS EVERY 24 HOURS
Status: DISCONTINUED | OUTPATIENT
Start: 2023-01-01 | End: 2023-01-01

## 2023-01-01 RX ORDER — FUROSEMIDE 10 MG/ML
40 INJECTION INTRAMUSCULAR; INTRAVENOUS ONCE
Status: COMPLETED | OUTPATIENT
Start: 2023-01-01 | End: 2023-01-01

## 2023-01-01 RX ORDER — GABAPENTIN 300 MG/1
300 CAPSULE ORAL 3 TIMES DAILY
Status: DISCONTINUED | OUTPATIENT
Start: 2023-01-01 | End: 2023-01-01

## 2023-01-01 RX ORDER — BALSAM PERU/CASTOR OIL
OINTMENT (GRAM) TOPICAL 2 TIMES DAILY
Status: DISCONTINUED | OUTPATIENT
Start: 2023-01-01 | End: 2023-01-01 | Stop reason: HOSPADM

## 2023-01-01 RX ORDER — ACETAMINOPHEN 500 MG
1000 TABLET ORAL EVERY 8 HOURS PRN
Status: DISCONTINUED | OUTPATIENT
Start: 2023-01-01 | End: 2023-01-01

## 2023-01-01 RX ORDER — SODIUM BICARBONATE IN D5W 150/1000ML
PLASTIC BAG, INJECTION (ML) INTRAVENOUS CONTINUOUS
Status: DISCONTINUED | OUTPATIENT
Start: 2023-01-01 | End: 2023-01-01

## 2023-01-01 RX ORDER — POTASSIUM CHLORIDE 20 MEQ/1
40 TABLET, EXTENDED RELEASE ORAL ONCE
Status: DISCONTINUED | OUTPATIENT
Start: 2023-01-01 | End: 2023-01-01 | Stop reason: SDUPTHER

## 2023-01-01 RX ORDER — DOPAMINE HYDROCHLORIDE 160 MG/100ML
0-20 INJECTION, SOLUTION INTRAVENOUS CONTINUOUS
Status: DISCONTINUED | OUTPATIENT
Start: 2023-01-01 | End: 2023-01-01

## 2023-01-01 RX ORDER — NOREPINEPHRINE BITARTRATE/D5W 4MG/250ML
PLASTIC BAG, INJECTION (ML) INTRAVENOUS
Status: DISPENSED
Start: 2023-01-01 | End: 2023-01-01

## 2023-01-01 RX ORDER — PROPOFOL 10 MG/ML
0-50 INJECTION, EMULSION INTRAVENOUS CONTINUOUS
Status: DISCONTINUED | OUTPATIENT
Start: 2023-01-01 | End: 2023-01-01 | Stop reason: HOSPADM

## 2023-01-01 RX ORDER — FUROSEMIDE 40 MG/1
80 TABLET ORAL ONCE
Status: COMPLETED | OUTPATIENT
Start: 2023-01-01 | End: 2023-01-01

## 2023-01-01 RX ORDER — FUROSEMIDE 10 MG/ML
40 INJECTION INTRAMUSCULAR; INTRAVENOUS
Status: DISCONTINUED | OUTPATIENT
Start: 2023-01-01 | End: 2023-01-01

## 2023-01-01 RX ORDER — ROCURONIUM BROMIDE 10 MG/ML
INJECTION, SOLUTION INTRAVENOUS
Status: DISCONTINUED | OUTPATIENT
Start: 2023-01-01 | End: 2023-01-01

## 2023-01-01 RX ORDER — POLYETHYLENE GLYCOL 3350 17 G/17G
17 POWDER, FOR SOLUTION ORAL DAILY
Status: DISCONTINUED | OUTPATIENT
Start: 2023-01-01 | End: 2023-01-01 | Stop reason: HOSPADM

## 2023-01-01 RX ORDER — ACETAMINOPHEN 500 MG
1000 TABLET ORAL EVERY 8 HOURS
Status: DISCONTINUED | OUTPATIENT
Start: 2023-01-01 | End: 2023-01-01

## 2023-01-01 RX ORDER — CIPROFLOXACIN 500 MG/1
500 TABLET ORAL 2 TIMES DAILY
Qty: 2 TABLET | Refills: 0 | OUTPATIENT
Start: 2023-01-01 | End: 2023-01-01

## 2023-01-01 RX ORDER — ONDANSETRON 2 MG/ML
4 INJECTION INTRAMUSCULAR; INTRAVENOUS EVERY 8 HOURS PRN
Status: DISCONTINUED | OUTPATIENT
Start: 2023-01-01 | End: 2023-01-01

## 2023-01-01 RX ORDER — METRONIDAZOLE 500 MG/1
500 TABLET ORAL 2 TIMES DAILY
Qty: 2 TABLET | Refills: 0 | OUTPATIENT
Start: 2023-01-01 | End: 2023-01-01

## 2023-01-01 RX ORDER — METRONIDAZOLE 500 MG/1
500 TABLET ORAL EVERY 8 HOURS
Status: DISCONTINUED | OUTPATIENT
Start: 2023-01-01 | End: 2023-01-01

## 2023-01-01 RX ORDER — MUPIROCIN 20 MG/G
1 OINTMENT TOPICAL
Status: COMPLETED | OUTPATIENT
Start: 2023-01-01 | End: 2023-01-01

## 2023-01-01 RX ORDER — OXYCODONE HYDROCHLORIDE 5 MG/1
5 TABLET ORAL EVERY 6 HOURS PRN
Qty: 30 TABLET | Refills: 0 | Status: SHIPPED | OUTPATIENT
Start: 2023-01-01 | End: 2023-01-01

## 2023-01-01 RX ORDER — LOSARTAN POTASSIUM 25 MG/1
25 TABLET ORAL DAILY
Status: DISCONTINUED | OUTPATIENT
Start: 2023-01-01 | End: 2023-01-01

## 2023-01-01 RX ORDER — SODIUM,POTASSIUM PHOSPHATES 280-250MG
1 POWDER IN PACKET (EA) ORAL ONCE
Status: COMPLETED | OUTPATIENT
Start: 2023-01-01 | End: 2023-01-01

## 2023-01-01 RX ORDER — METOPROLOL SUCCINATE 25 MG/1
25 TABLET, EXTENDED RELEASE ORAL DAILY
Status: DISCONTINUED | OUTPATIENT
Start: 2023-01-01 | End: 2023-01-01 | Stop reason: HOSPADM

## 2023-01-01 RX ORDER — CALCIUM GLUCONATE 20 MG/ML
1 INJECTION, SOLUTION INTRAVENOUS ONCE
Status: COMPLETED | OUTPATIENT
Start: 2023-01-01 | End: 2023-01-01

## 2023-01-01 RX ORDER — CLOPIDOGREL BISULFATE 75 MG/1
75 TABLET ORAL DAILY
Status: DISCONTINUED | OUTPATIENT
Start: 2023-01-01 | End: 2023-01-01 | Stop reason: HOSPADM

## 2023-01-01 RX ORDER — SIMETHICONE 80 MG
1 TABLET,CHEWABLE ORAL 3 TIMES DAILY PRN
Status: DISCONTINUED | OUTPATIENT
Start: 2023-01-01 | End: 2023-01-01 | Stop reason: HOSPADM

## 2023-01-01 RX ORDER — HYDROMORPHONE HYDROCHLORIDE 1 MG/ML
0.2 INJECTION, SOLUTION INTRAMUSCULAR; INTRAVENOUS; SUBCUTANEOUS EVERY 5 MIN PRN
Status: DISCONTINUED | OUTPATIENT
Start: 2023-01-01 | End: 2023-01-01 | Stop reason: HOSPADM

## 2023-01-01 RX ORDER — NALOXONE HCL 0.4 MG/ML
0.02 VIAL (ML) INJECTION
Status: DISCONTINUED | OUTPATIENT
Start: 2023-01-01 | End: 2023-01-01 | Stop reason: HOSPADM

## 2023-01-01 RX ORDER — POTASSIUM CHLORIDE 7.45 MG/ML
60 INJECTION INTRAVENOUS ONCE
Status: DISCONTINUED | OUTPATIENT
Start: 2023-01-01 | End: 2023-01-01

## 2023-01-01 RX ORDER — FAMOTIDINE 20 MG/1
20 TABLET, FILM COATED ORAL 2 TIMES DAILY
Status: DISCONTINUED | OUTPATIENT
Start: 2023-01-01 | End: 2023-01-01 | Stop reason: HOSPADM

## 2023-01-01 RX ORDER — PHENAZOPYRIDINE HYDROCHLORIDE 100 MG/1
100 TABLET, FILM COATED ORAL 3 TIMES DAILY PRN
Qty: 30 TABLET | Refills: 0 | Status: SHIPPED | OUTPATIENT
Start: 2023-01-01 | End: 2023-01-01

## 2023-01-01 RX ORDER — LIDOCAINE HYDROCHLORIDE 20 MG/ML
JELLY TOPICAL
Status: COMPLETED | OUTPATIENT
Start: 2023-01-01 | End: 2023-01-01

## 2023-01-01 RX ORDER — ACETAMINOPHEN 650 MG/20.3ML
975 LIQUID ORAL
Status: DISCONTINUED | OUTPATIENT
Start: 2023-01-01 | End: 2023-01-01

## 2023-01-01 RX ORDER — HYDROMORPHONE HCL IN 0.9% NACL 6 MG/30 ML
PATIENT CONTROLLED ANALGESIA SYRINGE INTRAVENOUS CONTINUOUS
Status: DISCONTINUED | OUTPATIENT
Start: 2023-01-01 | End: 2023-01-01

## 2023-01-01 RX ORDER — ALBUTEROL SULFATE 90 UG/1
2 AEROSOL, METERED RESPIRATORY (INHALATION) EVERY 6 HOURS PRN
Status: DISCONTINUED | OUTPATIENT
Start: 2023-01-01 | End: 2023-01-01 | Stop reason: HOSPADM

## 2023-01-01 RX ORDER — ONDANSETRON 2 MG/ML
INJECTION INTRAMUSCULAR; INTRAVENOUS
Status: DISCONTINUED | OUTPATIENT
Start: 2023-01-01 | End: 2023-01-01

## 2023-01-01 RX ORDER — FUROSEMIDE 10 MG/ML
60 INJECTION INTRAMUSCULAR; INTRAVENOUS
Status: COMPLETED | OUTPATIENT
Start: 2023-01-01 | End: 2023-01-01

## 2023-01-01 RX ORDER — ROCURONIUM BROMIDE 10 MG/ML
INJECTION, SOLUTION INTRAVENOUS
Status: COMPLETED
Start: 2023-01-01 | End: 2023-01-01

## 2023-01-01 RX ORDER — NOREPINEPHRINE BITARTRATE/D5W 4MG/250ML
0-3 PLASTIC BAG, INJECTION (ML) INTRAVENOUS CONTINUOUS
Status: DISCONTINUED | OUTPATIENT
Start: 2023-01-01 | End: 2023-01-01

## 2023-01-01 RX ORDER — SODIUM CHLORIDE 9 MG/ML
INJECTION, SOLUTION INTRAVENOUS CONTINUOUS
Status: DISCONTINUED | OUTPATIENT
Start: 2023-01-01 | End: 2023-01-01

## 2023-01-01 RX ORDER — METRONIDAZOLE 500 MG/1
1000 TABLET ORAL ONCE
Status: DISCONTINUED | OUTPATIENT
Start: 2023-01-01 | End: 2023-01-01

## 2023-01-01 RX ORDER — ETOMIDATE 2 MG/ML
INJECTION INTRAVENOUS
Status: DISPENSED
Start: 2023-01-01 | End: 2023-01-01

## 2023-01-01 RX ORDER — ALBUTEROL SULFATE 2.5 MG/.5ML
10 SOLUTION RESPIRATORY (INHALATION) ONCE
Status: COMPLETED | OUTPATIENT
Start: 2023-01-01 | End: 2023-01-01

## 2023-01-01 RX ORDER — POTASSIUM CHLORIDE 20 MEQ/1
20 TABLET, EXTENDED RELEASE ORAL ONCE
Status: DISCONTINUED | OUTPATIENT
Start: 2023-01-01 | End: 2023-01-01

## 2023-01-01 RX ORDER — FUROSEMIDE 40 MG/1
40 TABLET ORAL DAILY
Qty: 120 TABLET | Refills: 3 | Status: ON HOLD | OUTPATIENT
Start: 2023-01-01 | End: 2023-01-01 | Stop reason: HOSPADM

## 2023-01-01 RX ORDER — DEXTROSE 40 %
30 GEL (GRAM) ORAL
Status: DISCONTINUED | OUTPATIENT
Start: 2023-01-01 | End: 2023-01-01

## 2023-01-01 RX ORDER — ONDANSETRON 2 MG/ML
8 INJECTION INTRAMUSCULAR; INTRAVENOUS
Status: COMPLETED | OUTPATIENT
Start: 2023-01-01 | End: 2023-01-01

## 2023-01-01 RX ORDER — CALCIUM GLUCONATE 20 MG/ML
1 INJECTION, SOLUTION INTRAVENOUS EVERY 10 MIN PRN
Status: DISCONTINUED | OUTPATIENT
Start: 2023-01-01 | End: 2023-01-01

## 2023-01-01 RX ORDER — FUROSEMIDE 10 MG/ML
120 INJECTION INTRAMUSCULAR; INTRAVENOUS ONCE
Status: DISCONTINUED | OUTPATIENT
Start: 2023-01-01 | End: 2023-01-01

## 2023-01-01 RX ORDER — FUROSEMIDE 40 MG/1
80 TABLET ORAL DAILY
Status: CANCELLED | OUTPATIENT
Start: 2023-01-01

## 2023-01-01 RX ORDER — OXYCODONE HYDROCHLORIDE 10 MG/1
10 TABLET ORAL EVERY 4 HOURS PRN
Status: DISCONTINUED | OUTPATIENT
Start: 2023-01-01 | End: 2023-01-01

## 2023-01-01 RX ORDER — NOREPINEPHRINE BITARTRATE/D5W 8 MG/250ML
0-3 PLASTIC BAG, INJECTION (ML) INTRAVENOUS CONTINUOUS
Status: DISCONTINUED | OUTPATIENT
Start: 2023-01-01 | End: 2023-01-01

## 2023-01-01 RX ORDER — DIGOXIN 0.25 MG/ML
250 INJECTION INTRAMUSCULAR; INTRAVENOUS ONCE
Status: COMPLETED | OUTPATIENT
Start: 2023-01-01 | End: 2023-01-01

## 2023-01-01 RX ORDER — METRONIDAZOLE 500 MG/1
500 TABLET ORAL
Status: COMPLETED | OUTPATIENT
Start: 2023-01-01 | End: 2023-01-01

## 2023-01-01 RX ORDER — POLYETHYLENE GLYCOL 3350 17 G/17G
17 POWDER, FOR SOLUTION ORAL DAILY
Qty: 510 G | Refills: 0 | Status: SHIPPED | OUTPATIENT
Start: 2023-01-01 | End: 2023-01-01

## 2023-01-01 RX ORDER — NYSTATIN 100000 [USP'U]/ML
500000 SUSPENSION ORAL 4 TIMES DAILY
Status: DISCONTINUED | OUTPATIENT
Start: 2023-01-01 | End: 2023-01-01

## 2023-01-01 RX ORDER — FUROSEMIDE 20 MG/1
20 TABLET ORAL DAILY
Status: DISCONTINUED | OUTPATIENT
Start: 2023-01-01 | End: 2023-01-01

## 2023-01-01 RX ORDER — ACETAMINOPHEN 10 MG/ML
1000 INJECTION, SOLUTION INTRAVENOUS EVERY 8 HOURS
Status: COMPLETED | OUTPATIENT
Start: 2023-01-01 | End: 2023-01-01

## 2023-01-01 RX ORDER — EZETIMIBE 10 MG/1
10 TABLET ORAL DAILY
Status: DISCONTINUED | OUTPATIENT
Start: 2023-01-01 | End: 2023-01-01

## 2023-01-01 RX ORDER — METRONIDAZOLE 500 MG/1
1000 TABLET ORAL
Status: DISCONTINUED | OUTPATIENT
Start: 2023-01-01 | End: 2023-01-01

## 2023-01-01 RX ORDER — DOXYCYCLINE HYCLATE 100 MG
100 TABLET ORAL
Status: COMPLETED | OUTPATIENT
Start: 2023-01-01 | End: 2023-01-01

## 2023-01-01 RX ORDER — DEXTROMETHORPHAN/PSEUDOEPHED 2.5-7.5/.8
40 DROPS ORAL 4 TIMES DAILY PRN
Status: DISCONTINUED | OUTPATIENT
Start: 2023-01-01 | End: 2023-01-01

## 2023-01-01 RX ORDER — ALPRAZOLAM 0.5 MG/1
0.5 TABLET ORAL NIGHTLY PRN
Status: DISCONTINUED | OUTPATIENT
Start: 2023-01-01 | End: 2023-01-01 | Stop reason: HOSPADM

## 2023-01-01 RX ORDER — LORAZEPAM 2 MG/ML
0.5 INJECTION INTRAMUSCULAR
Status: DISCONTINUED | OUTPATIENT
Start: 2023-01-01 | End: 2023-01-01 | Stop reason: HOSPADM

## 2023-01-01 RX ORDER — GABAPENTIN 300 MG/1
300 CAPSULE ORAL
Status: DISCONTINUED | OUTPATIENT
Start: 2023-01-01 | End: 2023-01-01

## 2023-01-01 RX ORDER — MOXIFLOXACIN HYDROCHLORIDE 400 MG/1
400 TABLET ORAL DAILY
Qty: 14 TABLET | Refills: 0 | Status: SHIPPED | OUTPATIENT
Start: 2023-01-01 | End: 2023-01-01 | Stop reason: HOSPADM

## 2023-01-01 RX ORDER — CIPROFLOXACIN 500 MG/1
500 TABLET ORAL
Status: COMPLETED | OUTPATIENT
Start: 2023-01-01 | End: 2023-01-01

## 2023-01-01 RX ORDER — PROCHLORPERAZINE MALEATE 5 MG
10 TABLET ORAL
Status: COMPLETED | OUTPATIENT
Start: 2023-01-01 | End: 2023-01-01

## 2023-01-01 RX ORDER — CALCIUM GLUCONATE 20 MG/ML
1 INJECTION, SOLUTION INTRAVENOUS ONCE
Status: DISCONTINUED | OUTPATIENT
Start: 2023-01-01 | End: 2023-01-01

## 2023-01-01 RX ORDER — POTASSIUM CHLORIDE 750 MG/1
30 CAPSULE, EXTENDED RELEASE ORAL ONCE
Status: COMPLETED | OUTPATIENT
Start: 2023-01-01 | End: 2023-01-01

## 2023-01-01 RX ORDER — NEOMYCIN SULFATE 500 MG/1
1000 TABLET ORAL
Status: DISCONTINUED | OUTPATIENT
Start: 2023-01-01 | End: 2023-01-01

## 2023-01-01 RX ORDER — ASPIRIN 81 MG/1
81 TABLET ORAL DAILY
Status: DISCONTINUED | OUTPATIENT
Start: 2023-01-01 | End: 2023-01-01

## 2023-01-01 RX ORDER — INDOMETHACIN 25 MG/1
CAPSULE ORAL
Status: DISPENSED
Start: 2023-01-01 | End: 2023-01-01

## 2023-01-01 RX ORDER — ETOMIDATE 2 MG/ML
INJECTION INTRAVENOUS
Status: DISCONTINUED | OUTPATIENT
Start: 2023-01-01 | End: 2023-01-01

## 2023-01-01 RX ORDER — MOXIFLOXACIN HYDROCHLORIDE 400 MG/1
400 TABLET ORAL DAILY
Status: COMPLETED | OUTPATIENT
Start: 2023-01-01 | End: 2023-01-01

## 2023-01-01 RX ORDER — METRONIDAZOLE 500 MG/100ML
500 INJECTION, SOLUTION INTRAVENOUS
Status: COMPLETED | OUTPATIENT
Start: 2023-01-01 | End: 2023-01-01

## 2023-01-01 RX ORDER — POTASSIUM CHLORIDE 7.45 MG/ML
10 INJECTION INTRAVENOUS
Status: ACTIVE | OUTPATIENT
Start: 2023-01-01 | End: 2023-01-01

## 2023-01-01 RX ORDER — NEOMYCIN SULFATE 500 MG/1
1000 TABLET ORAL ONCE
Status: DISCONTINUED | OUTPATIENT
Start: 2023-01-01 | End: 2023-01-01

## 2023-01-01 RX ORDER — FUROSEMIDE 10 MG/ML
80 INJECTION INTRAMUSCULAR; INTRAVENOUS
Status: DISCONTINUED | OUTPATIENT
Start: 2023-01-01 | End: 2023-01-01

## 2023-01-01 RX ORDER — POLYETHYLENE GLYCOL 3350, SODIUM SULFATE ANHYDROUS, SODIUM BICARBONATE, SODIUM CHLORIDE, POTASSIUM CHLORIDE 236; 22.74; 6.74; 5.86; 2.97 G/4L; G/4L; G/4L; G/4L; G/4L
4000 POWDER, FOR SOLUTION ORAL ONCE
Status: COMPLETED | OUTPATIENT
Start: 2023-01-01 | End: 2023-01-01

## 2023-01-01 RX ORDER — POTASSIUM CHLORIDE 7.45 MG/ML
10 INJECTION INTRAVENOUS
Status: COMPLETED | OUTPATIENT
Start: 2023-01-01 | End: 2023-01-01

## 2023-01-01 RX ORDER — DEXAMETHASONE SODIUM PHOSPHATE 4 MG/ML
INJECTION, SOLUTION INTRA-ARTICULAR; INTRALESIONAL; INTRAMUSCULAR; INTRAVENOUS; SOFT TISSUE
Status: DISCONTINUED | OUTPATIENT
Start: 2023-01-01 | End: 2023-01-01

## 2023-01-01 RX ORDER — ACETAMINOPHEN 500 MG
1000 TABLET ORAL EVERY 8 HOURS PRN
Status: DISCONTINUED | OUTPATIENT
Start: 2023-01-01 | End: 2023-01-01 | Stop reason: HOSPADM

## 2023-01-01 RX ORDER — METOPROLOL SUCCINATE 25 MG/1
25 TABLET, EXTENDED RELEASE ORAL DAILY
Qty: 90 TABLET | Refills: 3 | Status: SHIPPED | OUTPATIENT
Start: 2023-01-01

## 2023-01-01 RX ORDER — SODIUM,POTASSIUM PHOSPHATES 280-250MG
1 POWDER IN PACKET (EA) ORAL
Status: DISPENSED | OUTPATIENT
Start: 2023-01-01 | End: 2023-01-01

## 2023-01-01 RX ORDER — DOPAMINE HYDROCHLORIDE 160 MG/100ML
INJECTION, SOLUTION INTRAVENOUS
Status: COMPLETED
Start: 2023-01-01 | End: 2023-01-01

## 2023-01-01 RX ORDER — PROPOFOL 10 MG/ML
INJECTION, EMULSION INTRAVENOUS
Status: DISPENSED
Start: 2023-01-01 | End: 2023-01-01

## 2023-01-01 RX ORDER — OXYBUTYNIN CHLORIDE 10 MG/1
10 TABLET, EXTENDED RELEASE ORAL DAILY
Qty: 30 TABLET | Refills: 1 | Status: SHIPPED | OUTPATIENT
Start: 2023-01-01 | End: 2023-01-01

## 2023-01-01 RX ADMIN — FAMOTIDINE 20 MG: 20 TABLET ORAL at 09:02

## 2023-01-01 RX ADMIN — EZETIMIBE 10 MG: 10 TABLET ORAL at 10:02

## 2023-01-01 RX ADMIN — METOPROLOL SUCCINATE 25 MG: 25 TABLET, EXTENDED RELEASE ORAL at 09:02

## 2023-01-01 RX ADMIN — Medication: at 10:02

## 2023-01-01 RX ADMIN — ENOXAPARIN SODIUM 40 MG: 40 INJECTION SUBCUTANEOUS at 04:02

## 2023-01-01 RX ADMIN — Medication: at 10:05

## 2023-01-01 RX ADMIN — ONDANSETRON 4 MG: 2 INJECTION INTRAMUSCULAR; INTRAVENOUS at 10:02

## 2023-01-01 RX ADMIN — FAMOTIDINE 20 MG: 20 TABLET, FILM COATED ORAL at 08:02

## 2023-01-01 RX ADMIN — CLOPIDOGREL BISULFATE 75 MG: 75 TABLET ORAL at 09:02

## 2023-01-01 RX ADMIN — Medication: at 09:02

## 2023-01-01 RX ADMIN — NYSTATIN 500000 UNITS: 500000 SUSPENSION ORAL at 01:02

## 2023-01-01 RX ADMIN — ALPRAZOLAM 0.5 MG: 0.5 TABLET ORAL at 06:03

## 2023-01-01 RX ADMIN — PHENYLEPHRINE HYDROCHLORIDE 5 MCG/KG/MIN: 10 INJECTION, SOLUTION INTRAVENOUS at 07:05

## 2023-01-01 RX ADMIN — Medication: at 09:03

## 2023-01-01 RX ADMIN — MAGNESIUM SULFATE 4 G: 2 INJECTION INTRAVENOUS at 09:02

## 2023-01-01 RX ADMIN — CIPROFLOXACIN HYDROCHLORIDE 500 MG: 500 TABLET, FILM COATED ORAL at 10:02

## 2023-01-01 RX ADMIN — CIPROFLOXACIN HYDROCHLORIDE 500 MG: 500 TABLET, FILM COATED ORAL at 09:02

## 2023-01-01 RX ADMIN — NYSTATIN 500000 UNITS: 500000 SUSPENSION ORAL at 04:02

## 2023-01-01 RX ADMIN — ALPRAZOLAM 0.5 MG: 0.5 TABLET ORAL at 11:02

## 2023-01-01 RX ADMIN — MORPHINE SULFATE 2 MG: 2 INJECTION, SOLUTION INTRAMUSCULAR; INTRAVENOUS at 11:05

## 2023-01-01 RX ADMIN — NOREPINEPHRINE BITARTRATE 3 MCG/KG/MIN: 1 INJECTION, SOLUTION, CONCENTRATE INTRAVENOUS at 06:05

## 2023-01-01 RX ADMIN — ONDANSETRON 4 MG: 2 INJECTION INTRAMUSCULAR; INTRAVENOUS at 05:02

## 2023-01-01 RX ADMIN — FUROSEMIDE 20 MG: 20 TABLET ORAL at 10:02

## 2023-01-01 RX ADMIN — SIMETHICONE 80 MG: 80 TABLET, CHEWABLE ORAL at 04:03

## 2023-01-01 RX ADMIN — OXYCODONE HYDROCHLORIDE 5 MG: 5 TABLET ORAL at 03:02

## 2023-01-01 RX ADMIN — ASPIRIN 81 MG: 81 TABLET, COATED ORAL at 09:02

## 2023-01-01 RX ADMIN — SODIUM BICARBONATE 100 MEQ: 84 INJECTION, SOLUTION INTRAVENOUS at 10:05

## 2023-01-01 RX ADMIN — METRONIDAZOLE 500 MG: 500 TABLET ORAL at 10:02

## 2023-01-01 RX ADMIN — NYSTATIN 500000 UNITS: 500000 SUSPENSION ORAL at 05:02

## 2023-01-01 RX ADMIN — PROCHLORPERAZINE EDISYLATE 5 MG: 5 INJECTION INTRAMUSCULAR; INTRAVENOUS at 04:03

## 2023-01-01 RX ADMIN — FAMOTIDINE 20 MG: 10 INJECTION, SOLUTION INTRAVENOUS at 07:05

## 2023-01-01 RX ADMIN — ALPRAZOLAM 0.5 MG: 0.5 TABLET ORAL at 09:03

## 2023-01-01 RX ADMIN — MAGNESIUM SULFATE 2 G: 2 INJECTION INTRAVENOUS at 08:02

## 2023-01-01 RX ADMIN — CALCIUM GLUCONATE 1 G: 20 INJECTION, SOLUTION INTRAVENOUS at 01:05

## 2023-01-01 RX ADMIN — Medication: at 08:02

## 2023-01-01 RX ADMIN — FUROSEMIDE 20 MG: 20 TABLET ORAL at 08:03

## 2023-01-01 RX ADMIN — IOHEXOL 15 ML: 350 INJECTION, SOLUTION INTRAVENOUS at 01:02

## 2023-01-01 RX ADMIN — METRONIDAZOLE 500 MG: 500 TABLET ORAL at 09:02

## 2023-01-01 RX ADMIN — CLOPIDOGREL BISULFATE 75 MG: 75 TABLET ORAL at 08:02

## 2023-01-01 RX ADMIN — METOPROLOL SUCCINATE 25 MG: 25 TABLET, EXTENDED RELEASE ORAL at 09:03

## 2023-01-01 RX ADMIN — METOPROLOL SUCCINATE 12.5 MG: 25 TABLET, EXTENDED RELEASE ORAL at 08:02

## 2023-01-01 RX ADMIN — ASPIRIN 81 MG: 81 TABLET, COATED ORAL at 10:02

## 2023-01-01 RX ADMIN — METOPROLOL SUCCINATE 25 MG: 25 TABLET, EXTENDED RELEASE ORAL at 08:03

## 2023-01-01 RX ADMIN — OXYCODONE HYDROCHLORIDE 10 MG: 10 TABLET ORAL at 11:03

## 2023-01-01 RX ADMIN — NYSTATIN 500000 UNITS: 500000 SUSPENSION ORAL at 10:02

## 2023-01-01 RX ADMIN — EZETIMIBE 10 MG: 10 TABLET ORAL at 08:02

## 2023-01-01 RX ADMIN — MOXIFLOXACIN 400 MG: 400 TABLET, FILM COATED ORAL at 09:03

## 2023-01-01 RX ADMIN — POLYETHYLENE GLYCOL 3350 17 G: 17 POWDER, FOR SOLUTION ORAL at 09:02

## 2023-01-01 RX ADMIN — ACETAMINOPHEN 1000 MG: 500 TABLET ORAL at 01:02

## 2023-01-01 RX ADMIN — NYSTATIN 500000 UNITS: 500000 SUSPENSION ORAL at 02:02

## 2023-01-01 RX ADMIN — ONDANSETRON 4 MG: 2 INJECTION INTRAMUSCULAR; INTRAVENOUS at 04:01

## 2023-01-01 RX ADMIN — ASPIRIN 81 MG: 81 TABLET, COATED ORAL at 08:02

## 2023-01-01 RX ADMIN — OXYCODONE HYDROCHLORIDE 5 MG: 5 TABLET ORAL at 10:02

## 2023-01-01 RX ADMIN — SCOLOPAMINE TRANSDERMAL SYSTEM 1 PATCH: 1 PATCH, EXTENDED RELEASE TRANSDERMAL at 08:05

## 2023-01-01 RX ADMIN — FAMOTIDINE 20 MG: 20 TABLET ORAL at 08:03

## 2023-01-01 RX ADMIN — OXYCODONE HYDROCHLORIDE 10 MG: 10 TABLET ORAL at 01:03

## 2023-01-01 RX ADMIN — ALPRAZOLAM 0.5 MG: 0.5 TABLET ORAL at 12:02

## 2023-01-01 RX ADMIN — ACETAMINOPHEN 1000 MG: 10 INJECTION INTRAVENOUS at 05:02

## 2023-01-01 RX ADMIN — METRONIDAZOLE 500 MG: 500 INJECTION, SOLUTION INTRAVENOUS at 08:02

## 2023-01-01 RX ADMIN — MAGNESIUM SULFATE 2 G: 2 INJECTION INTRAVENOUS at 10:02

## 2023-01-01 RX ADMIN — SIMETHICONE 80 MG: 80 TABLET, CHEWABLE ORAL at 09:02

## 2023-01-01 RX ADMIN — PROCHLORPERAZINE MALEATE 10 MG: 5 TABLET ORAL at 02:05

## 2023-01-01 RX ADMIN — PHENYLEPHRINE HYDROCHLORIDE 5 MCG/KG/MIN: 10 INJECTION, SOLUTION INTRAVENOUS at 04:05

## 2023-01-01 RX ADMIN — LIDOCAINE HYDROCHLORIDE: 20 JELLY TOPICAL at 02:04

## 2023-01-01 RX ADMIN — ENOXAPARIN SODIUM 40 MG: 40 INJECTION SUBCUTANEOUS at 05:02

## 2023-01-01 RX ADMIN — LOSARTAN POTASSIUM 12.5 MG: 25 TABLET, FILM COATED ORAL at 08:02

## 2023-01-01 RX ADMIN — POLYETHYLENE GLYCOL 3350 17 G: 17 POWDER, FOR SOLUTION ORAL at 08:02

## 2023-01-01 RX ADMIN — Medication: at 07:02

## 2023-01-01 RX ADMIN — CEFPODOXIME PROXETIL 100 MG: 100 TABLET, FILM COATED ORAL at 09:02

## 2023-01-01 RX ADMIN — ONDANSETRON 8 MG: 2 INJECTION INTRAMUSCULAR; INTRAVENOUS at 03:05

## 2023-01-01 RX ADMIN — CALCIUM GLUCONATE 1 G: 20 INJECTION, SOLUTION INTRAVENOUS at 11:05

## 2023-01-01 RX ADMIN — INSULIN HUMAN 5.43 UNITS: 100 INJECTION, SOLUTION PARENTERAL at 10:05

## 2023-01-01 RX ADMIN — ALPRAZOLAM 0.5 MG: 0.5 TABLET ORAL at 09:02

## 2023-01-01 RX ADMIN — METOPROLOL SUCCINATE 25 MG: 25 TABLET, EXTENDED RELEASE ORAL at 08:02

## 2023-01-01 RX ADMIN — OXYCODONE HYDROCHLORIDE 5 MG: 5 TABLET ORAL at 03:03

## 2023-01-01 RX ADMIN — SODIUM CHLORIDE, SODIUM GLUCONATE, SODIUM ACETATE, POTASSIUM CHLORIDE, MAGNESIUM CHLORIDE, SODIUM PHOSPHATE, DIBASIC, AND POTASSIUM PHOSPHATE: .53; .5; .37; .037; .03; .012; .00082 INJECTION, SOLUTION INTRAVENOUS at 08:02

## 2023-01-01 RX ADMIN — Medication: at 08:03

## 2023-01-01 RX ADMIN — PROCHLORPERAZINE EDISYLATE 5 MG: 5 INJECTION INTRAMUSCULAR; INTRAVENOUS at 11:02

## 2023-01-01 RX ADMIN — FUROSEMIDE 20 MG: 20 TABLET ORAL at 08:02

## 2023-01-01 RX ADMIN — ROCURONIUM BROMIDE 50 MG: 10 INJECTION INTRAVENOUS at 11:05

## 2023-01-01 RX ADMIN — GENTAMICIN SULFATE 269.6 MG: 40 INJECTION, SOLUTION INTRAMUSCULAR; INTRAVENOUS at 07:02

## 2023-01-01 RX ADMIN — OXYCODONE HYDROCHLORIDE 5 MG: 5 TABLET ORAL at 08:02

## 2023-01-01 RX ADMIN — SIMETHICONE 80 MG: 80 TABLET, CHEWABLE ORAL at 04:02

## 2023-01-01 RX ADMIN — Medication: at 12:05

## 2023-01-01 RX ADMIN — POTASSIUM CHLORIDE 10 MEQ: 7.46 INJECTION, SOLUTION INTRAVENOUS at 02:05

## 2023-01-01 RX ADMIN — POTASSIUM & SODIUM PHOSPHATES POWDER PACK 280-160-250 MG 1 PACKET: 280-160-250 PACK at 09:02

## 2023-01-01 RX ADMIN — PHENYLEPHRINE HYDROCHLORIDE 5 MCG/KG/MIN: 10 INJECTION, SOLUTION INTRAVENOUS at 12:05

## 2023-01-01 RX ADMIN — POLYETHYLENE GLYCOL 3350 17 G: 17 POWDER, FOR SOLUTION ORAL at 08:03

## 2023-01-01 RX ADMIN — EZETIMIBE 10 MG: 10 TABLET ORAL at 09:02

## 2023-01-01 RX ADMIN — FUROSEMIDE 80 MG: 10 INJECTION, SOLUTION INTRAMUSCULAR; INTRAVENOUS at 09:02

## 2023-01-01 RX ADMIN — FUROSEMIDE 40 MG: 10 INJECTION, SOLUTION INTRAMUSCULAR; INTRAVENOUS at 09:05

## 2023-01-01 RX ADMIN — OXYCODONE HYDROCHLORIDE 10 MG: 10 TABLET ORAL at 09:02

## 2023-01-01 RX ADMIN — OXYCODONE HYDROCHLORIDE 10 MG: 10 TABLET ORAL at 02:02

## 2023-01-01 RX ADMIN — FUROSEMIDE 60 MG: 10 INJECTION, SOLUTION INTRAMUSCULAR; INTRAVENOUS at 09:05

## 2023-01-01 RX ADMIN — MIDAZOLAM HYDROCHLORIDE 2 MG: 1 INJECTION, SOLUTION INTRAMUSCULAR; INTRAVENOUS at 07:02

## 2023-01-01 RX ADMIN — NYSTATIN 500000 UNITS: 500000 SUSPENSION ORAL at 09:02

## 2023-01-01 RX ADMIN — MUPIROCIN: 20 OINTMENT TOPICAL at 10:05

## 2023-01-01 RX ADMIN — LOSARTAN POTASSIUM 12.5 MG: 25 TABLET, FILM COATED ORAL at 09:02

## 2023-01-01 RX ADMIN — POTASSIUM CHLORIDE 10 MEQ: 7.46 INJECTION, SOLUTION INTRAVENOUS at 04:05

## 2023-01-01 RX ADMIN — DOPAMINE HYDROCHLORIDE 400 MG: 160 INJECTION, SOLUTION INTRAVENOUS at 06:05

## 2023-01-01 RX ADMIN — DIGOXIN 250 MCG: 250 INJECTION, SOLUTION INTRAMUSCULAR; INTRAVENOUS at 02:05

## 2023-01-01 RX ADMIN — MAGNESIUM SULFATE 2 G: 2 INJECTION INTRAVENOUS at 01:02

## 2023-01-01 RX ADMIN — OXYCODONE HYDROCHLORIDE 5 MG: 5 TABLET ORAL at 09:03

## 2023-01-01 RX ADMIN — VASOPRESSIN 0.04 UNITS/MIN: 20 INJECTION INTRAVENOUS at 10:05

## 2023-01-01 RX ADMIN — OXYCODONE HYDROCHLORIDE 5 MG: 5 TABLET ORAL at 05:03

## 2023-01-01 RX ADMIN — Medication 20 MG: at 08:02

## 2023-01-01 RX ADMIN — OXYCODONE HYDROCHLORIDE 10 MG: 10 TABLET ORAL at 12:02

## 2023-01-01 RX ADMIN — METOPROLOL SUCCINATE 12.5 MG: 25 TABLET, EXTENDED RELEASE ORAL at 09:02

## 2023-01-01 RX ADMIN — Medication: at 01:03

## 2023-01-01 RX ADMIN — ACETAMINOPHEN 1000 MG: 500 TABLET ORAL at 10:02

## 2023-01-01 RX ADMIN — CLOPIDOGREL BISULFATE 75 MG: 75 TABLET ORAL at 08:03

## 2023-01-01 RX ADMIN — FUROSEMIDE 20 MG: 20 TABLET ORAL at 09:02

## 2023-01-01 RX ADMIN — OXYCODONE HYDROCHLORIDE 5 MG: 5 TABLET ORAL at 11:02

## 2023-01-01 RX ADMIN — LOSARTAN POTASSIUM 25 MG: 25 TABLET, FILM COATED ORAL at 09:02

## 2023-01-01 RX ADMIN — FAMOTIDINE 20 MG: 20 TABLET, FILM COATED ORAL at 03:01

## 2023-01-01 RX ADMIN — PROMETHAZINE HYDROCHLORIDE 25 MG: 25 INJECTION INTRAMUSCULAR; INTRAVENOUS at 02:05

## 2023-01-01 RX ADMIN — ACETAMINOPHEN 1000 MG: 500 TABLET ORAL at 09:02

## 2023-01-01 RX ADMIN — FUROSEMIDE 80 MG: 10 INJECTION, SOLUTION INTRAMUSCULAR; INTRAVENOUS at 02:02

## 2023-01-01 RX ADMIN — POTASSIUM CHLORIDE 10 MEQ: 7.46 INJECTION, SOLUTION INTRAVENOUS at 01:02

## 2023-01-01 RX ADMIN — OXYCODONE HYDROCHLORIDE 10 MG: 10 TABLET ORAL at 05:02

## 2023-01-01 RX ADMIN — SODIUM BICARBONATE 50 MEQ: 84 INJECTION, SOLUTION INTRAVENOUS at 11:05

## 2023-01-01 RX ADMIN — CALCIUM GLUCONATE 1 G: 20 INJECTION, SOLUTION INTRAVENOUS at 12:05

## 2023-01-01 RX ADMIN — MUPIROCIN: 20 OINTMENT TOPICAL at 09:02

## 2023-01-01 RX ADMIN — OXYCODONE HYDROCHLORIDE 10 MG: 10 TABLET ORAL at 11:02

## 2023-01-01 RX ADMIN — POTASSIUM BICARBONATE 40 MEQ: 391 TABLET, EFFERVESCENT ORAL at 11:02

## 2023-01-01 RX ADMIN — ALPRAZOLAM 0.5 MG: 0.5 TABLET ORAL at 10:02

## 2023-01-01 RX ADMIN — Medication 100 MG: at 02:04

## 2023-01-01 RX ADMIN — ACETAMINOPHEN 1000 MG: 500 TABLET ORAL at 02:02

## 2023-01-01 RX ADMIN — OXYCODONE HYDROCHLORIDE 10 MG: 10 TABLET ORAL at 08:02

## 2023-01-01 RX ADMIN — SODIUM CHLORIDE: 9 INJECTION, SOLUTION INTRAVENOUS at 08:05

## 2023-01-01 RX ADMIN — ONDANSETRON 4 MG: 2 INJECTION INTRAMUSCULAR; INTRAVENOUS at 02:02

## 2023-01-01 RX ADMIN — SODIUM CHLORIDE 500 ML: 9 INJECTION, SOLUTION INTRAVENOUS at 03:05

## 2023-01-01 RX ADMIN — FUROSEMIDE 20 MG: 20 TABLET ORAL at 09:03

## 2023-01-01 RX ADMIN — ALPRAZOLAM 0.5 MG: 0.5 TABLET ORAL at 02:02

## 2023-01-01 RX ADMIN — FUROSEMIDE 20 MG: 20 TABLET ORAL at 01:03

## 2023-01-01 RX ADMIN — MOXIFLOXACIN 400 MG: 400 TABLET, FILM COATED ORAL at 10:02

## 2023-01-01 RX ADMIN — FENTANYL CITRATE 25 MCG: 50 INJECTION, SOLUTION INTRAMUSCULAR; INTRAVENOUS at 11:02

## 2023-01-01 RX ADMIN — FAMOTIDINE 20 MG: 20 TABLET ORAL at 10:02

## 2023-01-01 RX ADMIN — MOXIFLOXACIN 400 MG: 400 TABLET, FILM COATED ORAL at 11:02

## 2023-01-01 RX ADMIN — OXYCODONE 5 MG: 5 TABLET ORAL at 10:02

## 2023-01-01 RX ADMIN — ONDANSETRON 4 MG: 2 INJECTION INTRAMUSCULAR; INTRAVENOUS at 09:02

## 2023-01-01 RX ADMIN — PROPOFOL 5 MCG/KG/MIN: 10 INJECTION, EMULSION INTRAVENOUS at 02:05

## 2023-01-01 RX ADMIN — POTASSIUM BICARBONATE 40 MEQ: 391 TABLET, EFFERVESCENT ORAL at 01:02

## 2023-01-01 RX ADMIN — PHENYLEPHRINE HYDROCHLORIDE 5 MCG/KG/MIN: 10 INJECTION, SOLUTION INTRAVENOUS at 02:05

## 2023-01-01 RX ADMIN — DEXAMETHASONE SODIUM PHOSPHATE 4 MG: 4 INJECTION, SOLUTION INTRAMUSCULAR; INTRAVENOUS at 08:02

## 2023-01-01 RX ADMIN — CLOPIDOGREL BISULFATE 75 MG: 75 TABLET ORAL at 10:02

## 2023-01-01 RX ADMIN — ACETAMINOPHEN 1000 MG: 500 TABLET ORAL at 06:02

## 2023-01-01 RX ADMIN — HYDROCORTISONE SODIUM SUCCINATE 50 MG: 100 INJECTION, POWDER, FOR SOLUTION INTRAMUSCULAR; INTRAVENOUS at 05:05

## 2023-01-01 RX ADMIN — NYSTATIN 500000 UNITS: 500000 SUSPENSION ORAL at 08:02

## 2023-01-01 RX ADMIN — AMIODARONE HYDROCHLORIDE 1 MG/MIN: 1.8 INJECTION, SOLUTION INTRAVENOUS at 04:05

## 2023-01-01 RX ADMIN — ONDANSETRON 4 MG: 2 INJECTION INTRAMUSCULAR; INTRAVENOUS at 01:02

## 2023-01-01 RX ADMIN — ONDANSETRON 4 MG: 2 INJECTION INTRAMUSCULAR; INTRAVENOUS at 03:02

## 2023-01-01 RX ADMIN — EZETIMIBE 10 MG: 10 TABLET ORAL at 01:03

## 2023-01-01 RX ADMIN — POTASSIUM & SODIUM PHOSPHATES POWDER PACK 280-160-250 MG 2 PACKET: 280-160-250 PACK at 08:02

## 2023-01-01 RX ADMIN — MUPIROCIN 1 G: 20 OINTMENT TOPICAL at 07:02

## 2023-01-01 RX ADMIN — FUROSEMIDE 60 MG: 10 INJECTION, SOLUTION INTRAMUSCULAR; INTRAVENOUS at 03:05

## 2023-01-01 RX ADMIN — FAMOTIDINE 20 MG: 20 TABLET, FILM COATED ORAL at 09:02

## 2023-01-01 RX ADMIN — SIMETHICONE 80 MG: 80 TABLET, CHEWABLE ORAL at 08:02

## 2023-01-01 RX ADMIN — CLOPIDOGREL BISULFATE 75 MG: 75 TABLET ORAL at 09:03

## 2023-01-01 RX ADMIN — POTASSIUM CHLORIDE 40 MEQ: 1500 TABLET, EXTENDED RELEASE ORAL at 09:02

## 2023-01-01 RX ADMIN — OXYCODONE HYDROCHLORIDE 10 MG: 10 TABLET ORAL at 02:03

## 2023-01-01 RX ADMIN — VASOPRESSIN 0.04 UNITS/MIN: 20 INJECTION INTRAVENOUS at 06:05

## 2023-01-01 RX ADMIN — OXYCODONE 5 MG: 5 TABLET ORAL at 05:02

## 2023-01-01 RX ADMIN — OXYCODONE HYDROCHLORIDE 5 MG: 5 TABLET ORAL at 07:02

## 2023-01-01 RX ADMIN — HEPARIN SODIUM 5000 UNITS: 5000 INJECTION INTRAVENOUS; SUBCUTANEOUS at 07:02

## 2023-01-01 RX ADMIN — FUROSEMIDE 20 MG: 20 TABLET ORAL at 11:02

## 2023-01-01 RX ADMIN — INSULIN ASPART 2 UNITS: 100 INJECTION, SOLUTION INTRAVENOUS; SUBCUTANEOUS at 05:05

## 2023-01-01 RX ADMIN — POLYETHYLENE GLYCOL 3350, SODIUM SULFATE ANHYDROUS, SODIUM BICARBONATE, SODIUM CHLORIDE, POTASSIUM CHLORIDE 4000 ML: 236; 22.74; 6.74; 5.86; 2.97 POWDER, FOR SOLUTION ORAL at 09:02

## 2023-01-01 RX ADMIN — MORPHINE SULFATE 2 MG: 2 INJECTION, SOLUTION INTRAMUSCULAR; INTRAVENOUS at 12:05

## 2023-01-01 RX ADMIN — METOPROLOL SUCCINATE 25 MG: 25 TABLET, EXTENDED RELEASE ORAL at 10:02

## 2023-01-01 RX ADMIN — MAGNESIUM SULFATE 2 G: 2 INJECTION INTRAVENOUS at 11:02

## 2023-01-01 RX ADMIN — OXYCODONE HYDROCHLORIDE 10 MG: 10 TABLET ORAL at 06:02

## 2023-01-01 RX ADMIN — IOHEXOL 75 ML: 350 INJECTION, SOLUTION INTRAVENOUS at 04:02

## 2023-01-01 RX ADMIN — POTASSIUM BICARBONATE 40 MEQ: 391 TABLET, EFFERVESCENT ORAL at 09:02

## 2023-01-01 RX ADMIN — POTASSIUM CHLORIDE 10 MEQ: 7.46 INJECTION, SOLUTION INTRAVENOUS at 11:02

## 2023-01-01 RX ADMIN — ASPIRIN 81 MG: 81 TABLET, COATED ORAL at 01:03

## 2023-01-01 RX ADMIN — OXYCODONE HYDROCHLORIDE 10 MG: 10 TABLET ORAL at 03:02

## 2023-01-01 RX ADMIN — POLYETHYLENE GLYCOL 3350 17 G: 17 POWDER, FOR SOLUTION ORAL at 09:01

## 2023-01-01 RX ADMIN — OXYCODONE HYDROCHLORIDE 5 MG: 5 TABLET ORAL at 02:02

## 2023-01-01 RX ADMIN — POTASSIUM BICARBONATE 40 MEQ: 391 TABLET, EFFERVESCENT ORAL at 02:02

## 2023-01-01 RX ADMIN — PHENYLEPHRINE HYDROCHLORIDE 0.5 MCG/KG/MIN: 10 INJECTION, SOLUTION INTRAVENOUS at 04:05

## 2023-01-01 RX ADMIN — OXYCODONE HYDROCHLORIDE 5 MG: 5 TABLET ORAL at 08:03

## 2023-01-01 RX ADMIN — FAMOTIDINE 20 MG: 20 TABLET ORAL at 09:03

## 2023-01-01 RX ADMIN — PHENYLEPHRINE HYDROCHLORIDE 5 MCG/KG/MIN: 10 INJECTION, SOLUTION INTRAVENOUS at 08:05

## 2023-01-01 RX ADMIN — POTASSIUM BICARBONATE 50 MEQ: 978 TABLET, EFFERVESCENT ORAL at 08:02

## 2023-01-01 RX ADMIN — ONDANSETRON 4 MG: 2 INJECTION INTRAMUSCULAR; INTRAVENOUS at 08:02

## 2023-01-01 RX ADMIN — OXYCODONE HYDROCHLORIDE 5 MG: 5 TABLET ORAL at 05:02

## 2023-01-01 RX ADMIN — OXYCODONE HYDROCHLORIDE 5 MG: 5 TABLET ORAL at 06:02

## 2023-01-01 RX ADMIN — ROCURONIUM BROMIDE 20 MG: 10 INJECTION INTRAVENOUS at 09:02

## 2023-01-01 RX ADMIN — ALPRAZOLAM 0.5 MG: 0.5 TABLET ORAL at 03:03

## 2023-01-01 RX ADMIN — OXYCODONE HYDROCHLORIDE 5 MG: 5 TABLET ORAL at 02:03

## 2023-01-01 RX ADMIN — EZETIMIBE 10 MG: 10 TABLET ORAL at 08:03

## 2023-01-01 RX ADMIN — OXYCODONE HYDROCHLORIDE 5 MG: 5 TABLET ORAL at 09:02

## 2023-01-01 RX ADMIN — CEFPODOXIME PROXETIL 100 MG: 100 TABLET, FILM COATED ORAL at 10:02

## 2023-01-01 RX ADMIN — ONDANSETRON 4 MG: 2 INJECTION INTRAMUSCULAR; INTRAVENOUS at 12:02

## 2023-01-01 RX ADMIN — MUPIROCIN: 20 OINTMENT TOPICAL at 10:02

## 2023-01-01 RX ADMIN — DEXTROSE MONOHYDRATE 500 ML: 100 INJECTION, SOLUTION INTRAVENOUS at 10:05

## 2023-01-01 RX ADMIN — ONDANSETRON 4 MG: 2 INJECTION INTRAMUSCULAR; INTRAVENOUS at 11:02

## 2023-01-01 RX ADMIN — FUROSEMIDE 80 MG: 40 TABLET ORAL at 05:02

## 2023-01-01 RX ADMIN — ROCURONIUM BROMIDE 54 MG: 10 INJECTION INTRAVENOUS at 11:05

## 2023-01-01 RX ADMIN — NOREPINEPHRINE BITARTRATE 0.02 MCG/KG/MIN: 4 INJECTION, SOLUTION INTRAVENOUS at 09:05

## 2023-01-01 RX ADMIN — EZETIMIBE 10 MG: 10 TABLET ORAL at 09:03

## 2023-01-01 RX ADMIN — POTASSIUM BICARBONATE 25 MEQ: 978 TABLET, EFFERVESCENT ORAL at 09:02

## 2023-01-01 RX ADMIN — METOPROLOL SUCCINATE 12.5 MG: 25 TABLET, EXTENDED RELEASE ORAL at 02:02

## 2023-01-01 RX ADMIN — Medication: at 05:05

## 2023-01-01 RX ADMIN — POLYETHYLENE GLYCOL 3350 17 G: 17 POWDER, FOR SOLUTION ORAL at 10:02

## 2023-01-01 RX ADMIN — PROCHLORPERAZINE EDISYLATE 5 MG: 5 INJECTION INTRAMUSCULAR; INTRAVENOUS at 02:02

## 2023-01-01 RX ADMIN — OXYCODONE HYDROCHLORIDE 10 MG: 10 TABLET ORAL at 04:02

## 2023-01-01 RX ADMIN — DOBUTAMINE HYDROCHLORIDE 5 MCG/KG/MIN: 400 INJECTION INTRAVENOUS at 02:05

## 2023-01-01 RX ADMIN — SIMETHICONE 80 MG: 80 TABLET, CHEWABLE ORAL at 12:02

## 2023-01-01 RX ADMIN — FUROSEMIDE 40 MG: 40 TABLET ORAL at 03:02

## 2023-01-01 RX ADMIN — MOXIFLOXACIN 400 MG: 400 TABLET, FILM COATED ORAL at 09:02

## 2023-01-01 RX ADMIN — VASOPRESSIN 0.04 UNITS/MIN: 20 INJECTION INTRAVENOUS at 03:05

## 2023-01-01 RX ADMIN — POTASSIUM & SODIUM PHOSPHATES POWDER PACK 280-160-250 MG 1 PACKET: 280-160-250 PACK at 10:02

## 2023-01-01 RX ADMIN — POTASSIUM CHLORIDE 10 MEQ: 7.46 INJECTION, SOLUTION INTRAVENOUS at 10:02

## 2023-01-01 RX ADMIN — MEROPENEM 1 G: 1 INJECTION, POWDER, FOR SOLUTION INTRAVENOUS at 01:05

## 2023-01-01 RX ADMIN — ASPIRIN 81 MG: 81 TABLET, COATED ORAL at 09:03

## 2023-01-01 RX ADMIN — PHENYLEPHRINE HYDROCHLORIDE 5 MCG/KG/MIN: 10 INJECTION, SOLUTION INTRAVENOUS at 10:05

## 2023-01-01 RX ADMIN — MAGNESIUM SULFATE 2 G: 2 INJECTION INTRAVENOUS at 06:02

## 2023-01-01 RX ADMIN — OXYCODONE HYDROCHLORIDE 10 MG: 10 TABLET ORAL at 01:02

## 2023-01-01 RX ADMIN — ASPIRIN 81 MG: 81 TABLET, COATED ORAL at 08:03

## 2023-01-01 RX ADMIN — POTASSIUM CHLORIDE 30 MEQ: 10 CAPSULE, COATED, EXTENDED RELEASE ORAL at 12:02

## 2023-01-01 RX ADMIN — OXYCODONE HYDROCHLORIDE 10 MG: 10 TABLET ORAL at 08:03

## 2023-01-01 RX ADMIN — SODIUM CHLORIDE: 0.9 INJECTION, SOLUTION INTRAVENOUS at 07:02

## 2023-01-01 RX ADMIN — OXYCODONE HYDROCHLORIDE 10 MG: 10 TABLET ORAL at 06:03

## 2023-01-01 RX ADMIN — FAMOTIDINE 20 MG: 20 TABLET, FILM COATED ORAL at 10:02

## 2023-01-01 RX ADMIN — Medication: at 01:02

## 2023-01-01 RX ADMIN — ALPRAZOLAM 0.5 MG: 0.5 TABLET ORAL at 03:05

## 2023-01-01 RX ADMIN — NOREPINEPHRINE BITARTRATE 3 MCG/KG/MIN: 1 INJECTION, SOLUTION, CONCENTRATE INTRAVENOUS at 10:05

## 2023-01-01 RX ADMIN — OXYCODONE HYDROCHLORIDE 10 MG: 10 TABLET ORAL at 05:03

## 2023-01-01 RX ADMIN — Medication: at 11:02

## 2023-01-01 RX ADMIN — NOREPINEPHRINE BITARTRATE 3 MCG/KG/MIN: 1 INJECTION, SOLUTION, CONCENTRATE INTRAVENOUS at 11:05

## 2023-01-01 RX ADMIN — FUROSEMIDE 80 MG: 40 TABLET ORAL at 01:02

## 2023-01-01 RX ADMIN — MAGNESIUM SULFATE 2 G: 2 INJECTION INTRAVENOUS at 12:02

## 2023-01-01 RX ADMIN — Medication: at 10:03

## 2023-01-01 RX ADMIN — POTASSIUM BICARBONATE 40 MEQ: 391 TABLET, EFFERVESCENT ORAL at 10:02

## 2023-01-01 RX ADMIN — LIDOCAINE HYDROCHLORIDE 50 MG: 20 INJECTION INTRAVENOUS at 07:02

## 2023-01-01 RX ADMIN — MAGNESIUM SULFATE 2 G: 2 INJECTION INTRAVENOUS at 09:02

## 2023-01-01 RX ADMIN — ONDANSETRON 4 MG: 2 INJECTION INTRAMUSCULAR; INTRAVENOUS at 08:03

## 2023-01-01 RX ADMIN — CLOPIDOGREL BISULFATE 75 MG: 75 TABLET ORAL at 01:03

## 2023-01-01 RX ADMIN — METRONIDAZOLE 1000 MG: 500 TABLET ORAL at 12:02

## 2023-01-01 RX ADMIN — ONDANSETRON 4 MG: 2 INJECTION INTRAMUSCULAR; INTRAVENOUS at 04:02

## 2023-01-01 RX ADMIN — ACETAMINOPHEN 1000 MG: 10 INJECTION INTRAVENOUS at 09:02

## 2023-01-01 RX ADMIN — ALPRAZOLAM 0.5 MG: 0.5 TABLET ORAL at 01:02

## 2023-01-01 RX ADMIN — OXYCODONE HYDROCHLORIDE 5 MG: 5 TABLET ORAL at 01:03

## 2023-01-01 RX ADMIN — ETOMIDATE 20 MG: 2 INJECTION INTRAVENOUS at 07:02

## 2023-01-01 RX ADMIN — HYDROCORTISONE SODIUM SUCCINATE 50 MG: 100 INJECTION, POWDER, FOR SOLUTION INTRAMUSCULAR; INTRAVENOUS at 03:05

## 2023-01-01 RX ADMIN — FUROSEMIDE 20 MG: 20 TABLET ORAL at 02:02

## 2023-01-01 RX ADMIN — FAMOTIDINE 20 MG: 10 INJECTION, SOLUTION INTRAVENOUS at 09:05

## 2023-01-01 RX ADMIN — POTASSIUM CHLORIDE 10 MEQ: 7.46 INJECTION, SOLUTION INTRAVENOUS at 09:02

## 2023-01-01 RX ADMIN — NOREPINEPHRINE BITARTRATE 3 MCG/KG/MIN: 1 INJECTION, SOLUTION, CONCENTRATE INTRAVENOUS at 08:05

## 2023-01-01 RX ADMIN — HYDROMORPHONE HYDROCHLORIDE 0.2 MG: 1 INJECTION, SOLUTION INTRAMUSCULAR; INTRAVENOUS; SUBCUTANEOUS at 12:02

## 2023-01-01 RX ADMIN — POTASSIUM & SODIUM PHOSPHATES POWDER PACK 280-160-250 MG 1 PACKET: 280-160-250 PACK at 07:02

## 2023-01-01 RX ADMIN — ACETAMINOPHEN 1000 MG: 500 TABLET ORAL at 11:02

## 2023-01-01 RX ADMIN — FUROSEMIDE 40 MG: 40 TABLET ORAL at 08:02

## 2023-01-01 RX ADMIN — NOREPINEPHRINE BITARTRATE 3 MCG/KG/MIN: 8 INJECTION, SOLUTION INTRAVENOUS at 11:05

## 2023-01-01 RX ADMIN — ACETAMINOPHEN 1000 MG: 500 TABLET ORAL at 05:02

## 2023-01-01 RX ADMIN — HYDROCORTISONE SODIUM SUCCINATE 50 MG: 100 INJECTION, POWDER, FOR SOLUTION INTRAMUSCULAR; INTRAVENOUS at 09:05

## 2023-01-01 RX ADMIN — LORAZEPAM 1 MG: 2 INJECTION INTRAMUSCULAR; INTRAVENOUS at 11:05

## 2023-01-01 RX ADMIN — LORAZEPAM 1 MG: 2 INJECTION INTRAMUSCULAR; INTRAVENOUS at 12:05

## 2023-01-01 RX ADMIN — FENTANYL CITRATE 25 MCG: 50 INJECTION, SOLUTION INTRAMUSCULAR; INTRAVENOUS at 08:02

## 2023-01-01 RX ADMIN — Medication: at 05:02

## 2023-01-01 RX ADMIN — ROCURONIUM BROMIDE 10 MG: 10 INJECTION INTRAVENOUS at 10:02

## 2023-01-01 RX ADMIN — FUROSEMIDE 40 MG: 10 INJECTION, SOLUTION INTRAMUSCULAR; INTRAVENOUS at 11:02

## 2023-01-01 RX ADMIN — PHENYLEPHRINE HYDROCHLORIDE 5 MCG/KG/MIN: 10 INJECTION, SOLUTION INTRAVENOUS at 05:05

## 2023-01-01 RX ADMIN — ALBUTEROL SULFATE 10 MG: 2.5 SOLUTION RESPIRATORY (INHALATION) at 11:05

## 2023-01-01 RX ADMIN — POTASSIUM CHLORIDE 10 MEQ: 7.46 INJECTION, SOLUTION INTRAVENOUS at 06:02

## 2023-01-01 RX ADMIN — ETOMIDATE 16.2 MG: 2 INJECTION, SOLUTION INTRAVENOUS at 11:05

## 2023-01-01 RX ADMIN — OXYCODONE HYDROCHLORIDE 5 MG: 5 TABLET ORAL at 04:01

## 2023-01-01 RX ADMIN — FUROSEMIDE 40 MG: 10 INJECTION, SOLUTION INTRAMUSCULAR; INTRAVENOUS at 10:02

## 2023-01-01 RX ADMIN — CIPROFLOXACIN HYDROCHLORIDE 500 MG: 500 TABLET, FILM COATED ORAL at 08:02

## 2023-01-01 RX ADMIN — MOXIFLOXACIN 400 MG: 400 TABLET, FILM COATED ORAL at 01:03

## 2023-01-01 RX ADMIN — PROCHLORPERAZINE EDISYLATE 5 MG: 5 INJECTION INTRAMUSCULAR; INTRAVENOUS at 05:02

## 2023-01-01 RX ADMIN — POTASSIUM CHLORIDE 10 MEQ: 7.46 INJECTION, SOLUTION INTRAVENOUS at 06:05

## 2023-01-01 RX ADMIN — AMIODARONE HYDROCHLORIDE 150 MG: 1.5 INJECTION, SOLUTION INTRAVENOUS at 03:05

## 2023-01-01 RX ADMIN — POLYETHYLENE GLYCOL 3350 17 G: 17 POWDER, FOR SOLUTION ORAL at 09:03

## 2023-01-01 RX ADMIN — NYSTATIN 500000 UNITS: 500000 SUSPENSION ORAL at 06:02

## 2023-01-01 RX ADMIN — NOREPINEPHRINE BITARTRATE 2.5 MCG/KG/MIN: 1 INJECTION, SOLUTION, CONCENTRATE INTRAVENOUS at 02:05

## 2023-01-01 RX ADMIN — DOBUTAMINE HYDROCHLORIDE 5 MCG/KG/MIN: 400 INJECTION INTRAVENOUS at 10:05

## 2023-01-01 RX ADMIN — ENOXAPARIN SODIUM 40 MG: 40 INJECTION SUBCUTANEOUS at 05:05

## 2023-01-01 RX ADMIN — OXYCODONE HYDROCHLORIDE 10 MG: 10 TABLET ORAL at 07:02

## 2023-01-01 RX ADMIN — NYSTATIN 500000 UNITS: 500000 SUSPENSION ORAL at 12:02

## 2023-01-01 RX ADMIN — POTASSIUM CHLORIDE 10 MEQ: 7.46 INJECTION, SOLUTION INTRAVENOUS at 07:05

## 2023-01-01 RX ADMIN — MOXIFLOXACIN 400 MG: 400 TABLET, FILM COATED ORAL at 08:02

## 2023-01-01 RX ADMIN — POLYETHYLENE GLYCOL 3350 17 G: 17 POWDER, FOR SOLUTION ORAL at 01:03

## 2023-01-01 RX ADMIN — Medication: at 12:02

## 2023-01-01 RX ADMIN — METOPROLOL SUCCINATE 25 MG: 25 TABLET, EXTENDED RELEASE ORAL at 01:03

## 2023-01-01 RX ADMIN — DEXTROSE MONOHYDRATE 250 ML: 100 INJECTION, SOLUTION INTRAVENOUS at 01:05

## 2023-01-01 RX ADMIN — FAMOTIDINE 20 MG: 20 TABLET ORAL at 08:02

## 2023-01-01 RX ADMIN — ROCURONIUM BROMIDE 20 MG: 10 INJECTION INTRAVENOUS at 08:02

## 2023-01-01 RX ADMIN — PHENYLEPHRINE HYDROCHLORIDE 5 MCG/KG/MIN: 10 INJECTION, SOLUTION INTRAVENOUS at 01:05

## 2023-01-01 RX ADMIN — SIMETHICONE 80 MG: 80 TABLET, CHEWABLE ORAL at 03:02

## 2023-01-01 RX ADMIN — Medication: at 06:05

## 2023-01-01 RX ADMIN — NOREPINEPHRINE BITARTRATE 2 MCG/KG/MIN: 1 INJECTION, SOLUTION, CONCENTRATE INTRAVENOUS at 01:05

## 2023-01-01 RX ADMIN — CIPROFLOXACIN 500 MG: 500 TABLET, FILM COATED ORAL at 09:02

## 2023-01-01 RX ADMIN — OXYCODONE HYDROCHLORIDE 5 MG: 5 TABLET ORAL at 12:02

## 2023-01-01 RX ADMIN — MEROPENEM 1 G: 1 INJECTION, POWDER, FOR SOLUTION INTRAVENOUS at 04:05

## 2023-01-01 RX ADMIN — NOREPINEPHRINE BITARTRATE 3 MCG/KG/MIN: 1 INJECTION, SOLUTION, CONCENTRATE INTRAVENOUS at 04:05

## 2023-01-01 RX ADMIN — MUPIROCIN: 20 OINTMENT TOPICAL at 08:02

## 2023-01-01 RX ADMIN — CEFPODOXIME PROXETIL 100 MG: 100 TABLET, FILM COATED ORAL at 02:02

## 2023-01-01 RX ADMIN — ACETAMINOPHEN 1000 MG: 10 INJECTION INTRAVENOUS at 01:02

## 2023-01-01 RX ADMIN — POTASSIUM BICARBONATE 40 MEQ: 391 TABLET, EFFERVESCENT ORAL at 12:02

## 2023-01-01 RX ADMIN — OXYCODONE HYDROCHLORIDE 10 MG: 10 TABLET ORAL at 10:02

## 2023-01-01 RX ADMIN — OXYCODONE HYDROCHLORIDE 10 MG: 10 TABLET ORAL at 04:03

## 2023-01-01 RX ADMIN — ROCURONIUM BROMIDE 50 MG: 10 INJECTION INTRAVENOUS at 07:02

## 2023-01-01 RX ADMIN — CEFPODOXIME PROXETIL 100 MG: 100 TABLET, FILM COATED ORAL at 08:02

## 2023-01-01 RX ADMIN — MORPHINE SULFATE 2 MG: 2 INJECTION, SOLUTION INTRAMUSCULAR; INTRAVENOUS at 07:05

## 2023-01-01 RX ADMIN — ENOXAPARIN SODIUM 40 MG: 40 INJECTION SUBCUTANEOUS at 06:02

## 2023-01-01 RX ADMIN — OXYCODONE HYDROCHLORIDE 5 MG: 5 TABLET ORAL at 04:02

## 2023-01-01 RX ADMIN — FUROSEMIDE 40 MG: 10 INJECTION, SOLUTION INTRAMUSCULAR; INTRAVENOUS at 09:02

## 2023-01-01 RX ADMIN — Medication: at 02:02

## 2023-01-01 RX ADMIN — ONDANSETRON 4 MG: 2 INJECTION INTRAMUSCULAR; INTRAVENOUS at 07:02

## 2023-01-01 RX ADMIN — FENTANYL CITRATE 50 MCG: 50 INJECTION, SOLUTION INTRAMUSCULAR; INTRAVENOUS at 07:02

## 2023-01-01 RX ADMIN — MAGNESIUM SULFATE HEPTAHYDRATE 1 G: 500 INJECTION, SOLUTION INTRAMUSCULAR; INTRAVENOUS at 10:02

## 2023-01-01 RX ADMIN — OXYCODONE HYDROCHLORIDE 10 MG: 10 TABLET ORAL at 07:03

## 2023-01-01 RX ADMIN — POTASSIUM PHOSPHATE, MONOBASIC AND POTASSIUM PHOSPHATE, DIBASIC 30 MMOL: 224; 236 INJECTION, SOLUTION, CONCENTRATE INTRAVENOUS at 03:02

## 2023-01-01 RX ADMIN — FAMOTIDINE 20 MG: 20 TABLET ORAL at 01:03

## 2023-01-01 RX ADMIN — FUROSEMIDE 80 MG: 10 INJECTION, SOLUTION INTRAMUSCULAR; INTRAVENOUS at 11:02

## 2023-01-01 RX ADMIN — VANCOMYCIN HYDROCHLORIDE 1250 MG: 1.25 INJECTION, POWDER, LYOPHILIZED, FOR SOLUTION INTRAVENOUS at 04:05

## 2023-01-01 RX ADMIN — DIPHENHYDRAMINE HYDROCHLORIDE 12.5 MG: 50 INJECTION, SOLUTION INTRAMUSCULAR; INTRAVENOUS at 07:02

## 2023-01-01 RX ADMIN — SIMETHICONE 80 MG: 80 TABLET, CHEWABLE ORAL at 09:03

## 2023-01-06 NOTE — PROGRESS NOTES
Subjective:      Patient ID: Odette Hart is a 67 y.o. female.    Chief Complaint: No chief complaint on file.    HPI:    ROS [employer] AICD remote interrogation report downloaded and prepared by medical assistant and interpreted by me and full report scanned into Epic media.  Battery OK with JOSE 4 years.  Leads OK.  Episodic nonsustained VT treated with ATP on 5 occasions since 2022.  Normal device function.    Past Medical History:   Diagnosis Date    Acute coronary syndrome     Acute exacerbation of chronic obstructive pulmonary disease (COPD) 3/23/2022    Allergy     Arthritis     Cardiomyopathy     CHF (congestive heart failure)     Coronary artery disease     Coronary artery disease of native artery of native heart with stable angina pectoris 2021: [facility]: Cath: LAD: Proximal stent patent. LCX: Proximal 80%. LCX: Dominant. Moderate disease. LCX: HEVER 2.75 x 18 mm. Distal dissection. HEVER 2.75 x 22 mm.     Diverticulitis     Diverticulosis     Familial hypercholesterolemia 2022    Former smoker 2022    Heart attack     Heart disease     History of myocardial infarction 2022    Hyperlipidemia     Hypertension     Hypertension 2022    ICD (implantable cardioverter-defibrillator) in place     Non-ST elevation myocardial infarction (NSTEMI) 2019        Past Surgical History:   Procedure Laterality Date    APPENDECTOMY N/A 2022    Procedure: APPENDECTOMY;  Surgeon: Rafa Cardozo MD;  Location: [facility] OR [address] [place] [address];  Service: Colon and Rectal;  Laterality: N/A;    ATRIAL CARDIAC PACEMAKER INSERTION      CECECTOMY N/A 2022    Procedure: EXCISION, CECUM;  Surgeon: Rafa Cardozo MD;  [facility];  Service: Colon and Rectal;  Laterality: N/A;     SECTION      CHOLECYSTECTOMY N/A 2022    Procedure: CHOLECYSTECTOMY;  Surgeon: Doug Epstein MD;  Location: [facility] OR [address] [place] [address];  Service: General;  Laterality: N/A;     COLONOSCOPY N/A 5/9/2022    Procedure: COLONOSCOPY;  Surgeon: Rafa Cardozo MD;  Location: [facility] OR [facility] FLR;  Service: Colon and Rectal;  Laterality: N/A;    COLOSTOMY  5/9/2022    Procedure: CREATION, COLOSTOMY;  Surgeon: Rafa Cardozo MD;  Location: [facility] OR 2ND FLR;  Service: Colon and Rectal;;    CORONARY STENT PLACEMENT      LEFT HEART CATHETERIZATION Left 1/24/2022    Procedure: CATHETERIZATION, HEART, LEFT;  Surgeon: Yohannes Cordova MD;  Location: [facility];  Service: Cardiology;  Laterality: Left;       Family History   Problem Relation Age of Onset    Heart disease Mother     Emphysema Father     Heart attack Brother        Social History     Socioeconomic History    Marital status:    Tobacco Use    Smoking status: Former     Packs/day: 0.50     Types: Cigarettes     Start date: 2/4/1976     Quit date: 12/4/2012     Years since quitting: 10.0    Smokeless tobacco: Never   Substance and Sexual Activity    Alcohol use: No    Drug use: No     Social Determinants of Health     Financial Resource Strain: Low Risk     Difficulty of Paying Living Expenses: Not hard at all   Food Insecurity: No Food Insecurity    Worried About Running Out of Food in the Last Year: Never true    Ran Out of Food in the Last Year: Never true   Transportation Needs: No Transportation Needs    Lack of Transportation (Medical): No    Lack of Transportation (Non-Medical): No   Physical Activity: Inactive    Days of Exercise per Week: 0 days    Minutes of Exercise per Session: 0 min   Stress: No Stress Concern Present    Feeling of Stress : Not at all   Social Connections: Moderately Integrated    Frequency of Communication with Friends and Family: More than three times a week    Frequency of Social Gatherings with Friends and Family: More than three times a week    Attends [employer] Services: 1 to 4 times per year    Active Member of Clubs or Organizations: Yes    Attends Club or Organization Meetings: 1 to 4 times  per year    Marital Status:    Housing Stability: Low Risk     Unable to Pay for Housing in the Last Year: No    Number of Places Lived in the Last Year: 1    Unstable Housing in the Last Year: No       Current Outpatient Medications on File Prior to Visit   Medication Sig Dispense Refill    acetaminophen (TYLENOL) 500 MG tablet Take 2 tablets (1,000 mg total) by mouth every 8 (eight) hours as needed for Pain.  0    albuterol (PROVENTIL/VENTOLIN HFA) 90 mcg/actuation inhaler Inhale 2 puffs into the lungs every 6 (six) hours as needed for Wheezing. Rescue 18 g 1    ALPRAZolam (XANAX) 0.5 MG tablet Take 1 tablet (0.5 mg total) by mouth nightly as needed for Anxiety. 30 tablet 1    aspirin (ECOTRIN) 81 MG EC tablet Take 1 tablet (81 mg total) by mouth once daily. 90 tablet 3    clopidogreL (PLAVIX) 75 mg tablet Take 1 tablet (75 mg total) by mouth once daily. 90 tablet 3    collagenase (SANTYL) ointment Apply topically once daily. 90 g 1    docusate sodium (COLACE) 100 MG capsule Take 1 capsule (100 mg total) by mouth 2 (two) times daily. 60 capsule 1    empagliflozin (JARDIANCE) 10 mg tablet Take 1 tablet (10 mg total) by mouth once daily. 30 tablet 11    ezetimibe (ZETIA) 10 mg tablet Take 1 tablet (10 mg total) by mouth every evening. 90 tablet 3    famotidine (PEPCID) 20 MG tablet Take 1 tablet (20 mg total) by mouth once daily. 30 tablet 0    fluticasone propionate (FLONASE) 50 mcg/actuation nasal spray 1 spray (50 mcg total) by Each Nostril route once daily. 9.9 mL 1    food supplemt, lactose-reduced (ENSURE HIGH PROTEIN) Liqd Take 240 mLs by mouth 2 (two) times a day. 60 each 3    furosemide (LASIX) 40 MG tablet Take 1 tablet (40 mg total) by mouth once daily. 30 tablet 11    losartan (COZAAR) 25 MG tablet Take 1 tablet (25 mg total) by mouth once daily. 90 tablet 3    metoprolol succinate (TOPROL-XL) 25 MG 24 hr tablet Take 1 tablet (25 mg total) by mouth once daily. 90 tablet 3    nitroGLYCERIN  (NITROSTAT) 0.4 MG SL tablet Place 1 tablet (0.4 mg total) under the tongue every 5 (five) minutes as needed for Chest pain. 25 tablet 3    ondansetron (ZOFRAN-ODT) 8 MG TbDL Take 1 tablet (8 mg total) by mouth every 12 (twelve) hours as needed (nausea). 20 tablet 2    promethazine (PHENERGAN) 25 MG tablet Take 1 tablet (25 mg total) by mouth every 6 (six) hours as needed for Nausea. 15 tablet 0    psyllium husk, aspartame, (METAMUCIL) 3.4 gram PwPk packet Take 1 packet by mouth once daily. 60 packet 1    simethicone (MYLICON) 80 MG chewable tablet Take 1 tablet (80 mg total) by mouth every 8 (eight) hours as needed for Flatulence. 120 tablet 5    spironolactone (ALDACTONE) 25 MG tablet Take 1 tablet (25 mg total) by mouth once daily. 90 tablet 3    traMADoL (ULTRAM) 50 mg tablet Take 1 tablet (50 mg total) by mouth every 4 (four) hours as needed for Pain. 20 tablet 0    [DISCONTINUED] pantoprazole (PROTONIX) 40 MG tablet Take 1 tablet (40 mg total) by mouth once daily. 30 tablet 1    [DISCONTINUED] prasugreL (EFFIENT) 10 mg Tab Take 1 tablet (10 mg total) by mouth once daily. 90 tablet 0     No current facility-administered medications on file prior to visit.       Review of patient's allergies indicates:   Allergen Reactions    Augmentin [amoxicillin-pot clavulanate] Swelling     Not allergic to amoxicillin just a derivative of augmentin    Lisinopril Other (See Comments)     Fluid around heart    Shellfish containing products Anaphylaxis     Pt.states she is allergic to SEAFOOD since the age of 13    Levaquin [levofloxacin] Other (See Comments)     Very bad joint pain    Clindamycin Palpitations     Chest pain     Objective:   There were no vitals filed for this visit.     Physical Exam     Assessment:     1. Automatic implantable cardioverter-defibrillator in situ      Plan:   Diagnoses and all orders for this visit:    Automatic implantable cardioverter-defibrillator in situ         No follow-ups on file.

## 2023-01-14 NOTE — PROGRESS NOTES
Subjective:       Patient ID: Odette Hart is a 67 y.o. female.    Chief Complaint: Hospital Follow Up    HPI  Pt isit today f/u from hospital she had rt leg pain w/u negative in hospital but appear fluid overloaded admit for diureses pt felt better but she developes herniated colostomy with distal ileum ow exposed outside abd wall she c/o pain when standing up or any pulling on the stoma no exudate no fever chill no bleeding pt is more depressed with this new complictaion pt states rather die than stay like this she need revision surgery but high risk but pt willing to take any risk she try do better with diet to decrease post op complications she denies sob cp only when try to do any physical exertion  Review of Systems    Objective:      Physical Exam  Vitals and nursing note reviewed.   Constitutional:       General: She is not in acute distress.     Appearance: She is well-developed.   HENT:      Head: Normocephalic and atraumatic.      Right Ear: External ear normal.      Left Ear: External ear normal.      Nose: Nose normal.      Mouth/Throat:      Pharynx: No oropharyngeal exudate.   Eyes:      Extraocular Movements: Extraocular movements intact.      Conjunctiva/sclera: Conjunctivae normal.      Pupils: Pupils are equal, round, and reactive to light.   Neck:      Thyroid: No thyromegaly.   Cardiovascular:      Rate and Rhythm: Normal rate and regular rhythm.      Heart sounds: Normal heart sounds. No murmur heard.    No friction rub. No gallop.   Pulmonary:      Effort: Pulmonary effort is normal. No respiratory distress.      Breath sounds: Normal breath sounds. No wheezing.   Abdominal:      General: Bowel sounds are normal. There is no distension.      Tenderness: There is no abdominal tenderness.   Musculoskeletal:         General: No tenderness or deformity. Normal range of motion.      Cervical back: Normal range of motion and neck supple.   Lymphadenopathy:      Cervical: No cervical  adenopathy.   Skin:     General: Skin is warm and dry.      Capillary Refill: Capillary refill takes less than 2 seconds.      Findings: No erythema or rash.   Neurological:      Mental Status: She is alert and oriented to person, place, and time.   Psychiatric:         Thought Content: Thought content normal.         Judgment: Judgment normal.       Assessment:       1. Herniated colostomy    2. Moderate episode of recurrent major depressive disorder    3. Ischemic cardiomyopathy    4. Insomnia, unspecified type    5. Anxiety    6. FTT (failure to thrive) in adult          Plan:       Herniated colostomy  Comments:  pt need revisional surgery when meically stable    Moderate episode of recurrent major depressive disorder  Comments:  pt is having reactive depression from her medical conditions    Ischemic cardiomyopathy  Comments:  continue with tx     Insomnia, unspecified type    Anxiety  -     ALPRAZolam (XANAX) 0.5 MG tablet; Take 1 tablet (0.5 mg total) by mouth nightly as needed for Anxiety.  Dispense: 30 tablet; Refill: 1    FTT (failure to thrive) in adult  -     Comprehensive Metabolic Panel; Future; Expected date: 01/12/2023  -     CBC Auto Differential; Future; Expected date: 01/12/2023  -     PREALBUMIN; Future; Expected date: 01/12/2023        Medication List with Changes/Refills   Current Medications    ACETAMINOPHEN (TYLENOL) 500 MG TABLET    Take 2 tablets (1,000 mg total) by mouth every 8 (eight) hours as needed for Pain.    ALBUTEROL (PROVENTIL/VENTOLIN HFA) 90 MCG/ACTUATION INHALER    Inhale 2 puffs into the lungs every 6 (six) hours as needed for Wheezing. Rescue    ASPIRIN (ECOTRIN) 81 MG EC TABLET    Take 1 tablet (81 mg total) by mouth once daily.    CLOPIDOGREL (PLAVIX) 75 MG TABLET    Take 1 tablet (75 mg total) by mouth once daily.    COLLAGENASE (SANTYL) OINTMENT    Apply topically once daily.    DOCUSATE SODIUM (COLACE) 100 MG CAPSULE    Take 1 capsule (100 mg total) by mouth 2 (two)  times daily.    EMPAGLIFLOZIN (JARDIANCE) 10 MG TABLET    Take 1 tablet (10 mg total) by mouth once daily.    EZETIMIBE (ZETIA) 10 MG TABLET    Take 1 tablet (10 mg total) by mouth every evening.    FAMOTIDINE (PEPCID) 20 MG TABLET    Take 1 tablet (20 mg total) by mouth once daily.    FLUTICASONE PROPIONATE (FLONASE) 50 MCG/ACTUATION NASAL SPRAY    1 spray (50 mcg total) by Each Nostril route once daily.    FOOD SUPPLEMT, LACTOSE-REDUCED (ENSURE HIGH PROTEIN) LIQD    Take 240 mLs by mouth 2 (two) times a day.    FUROSEMIDE (LASIX) 40 MG TABLET    Take 1 tablet (40 mg total) by mouth once daily.    LOSARTAN (COZAAR) 25 MG TABLET    Take 1 tablet (25 mg total) by mouth once daily.    METOPROLOL SUCCINATE (TOPROL-XL) 25 MG 24 HR TABLET    Take 1 tablet (25 mg total) by mouth once daily.    NITROGLYCERIN (NITROSTAT) 0.4 MG SL TABLET    Place 1 tablet (0.4 mg total) under the tongue every 5 (five) minutes as needed for Chest pain.    ONDANSETRON (ZOFRAN-ODT) 8 MG TBDL    Take 1 tablet (8 mg total) by mouth every 12 (twelve) hours as needed (nausea).    PROMETHAZINE (PHENERGAN) 25 MG TABLET    Take 1 tablet (25 mg total) by mouth every 6 (six) hours as needed for Nausea.    PSYLLIUM HUSK, ASPARTAME, (METAMUCIL) 3.4 GRAM PWPK PACKET    Take 1 packet by mouth once daily.    SIMETHICONE (MYLICON) 80 MG CHEWABLE TABLET    Take 1 tablet (80 mg total) by mouth every 8 (eight) hours as needed for Flatulence.    TRAMADOL (ULTRAM) 50 MG TABLET    Take 1 tablet (50 mg total) by mouth every 4 (four) hours as needed for Pain.   Changed and/or Refilled Medications    Modified Medication Previous Medication    ALPRAZOLAM (XANAX) 0.5 MG TABLET ALPRAZolam (XANAX) 0.5 MG tablet       Take 1 tablet (0.5 mg total) by mouth nightly as needed for Anxiety.    Take 1 tablet (0.5 mg total) by mouth nightly as needed for Anxiety.   Discontinued Medications    SPIRONOLACTONE (ALDACTONE) 25 MG TABLET    Take 1 tablet (25 mg total) by mouth once  daily.

## 2023-01-18 NOTE — TELEPHONE ENCOUNTER
----- Message from Israel Negrete RN sent at 1/18/2023  2:14 PM CST -----  Dr Cardozo would like this patient to be evaluated by anesthesia prior to surgery on 2/6. Thanks!

## 2023-01-18 NOTE — PROGRESS NOTES
This patient is here in clinic today for  EC fistula. and colostomy Surgery done  by Dr. Cardozo. He is getting her prepared now for closure on Feb 6th   The colostomy is non functioning now that her ECF is so active,    1/18/23 ECF is prolapsed now and this is it when she lays down , it is much bigger when standing                 12/16  soft stool from fistula is major stooling outlet, her colostomy has small pelet like pieces only and minimal.  Pouch leaked a few days ago she tried to pouch with closed end   TODAY I placed UMA NEW IMAGE 23/4 flange to this are, I plugged the lower hole with stoma paste and EBS  the cut flat wafer in a keyhole fashion and used stoma paste and powder on paste , showed them how to do this and advised they remove the pch to empty and not squeeze out the stool   Son will do this for her     12/12  She is doing better overall and gained some wt.  Taking WOUNDVITE   eating well  I will check her next week and see how this is going  Gave him 3 sets of supplies .

## 2023-01-19 NOTE — TELEPHONE ENCOUNTER
Scheduled appt with patient for 1/25/23 at 1pm      ----- Message from Suri Simeon sent at 1/19/2023  4:04 PM CST -----  Regarding: missed call  Type:  Patient Returning Call    Who Called:Yanet   Who Left Message for Patient: Charu  Does the patient know what this is regarding?:yes   Would the patient rather a call back or a response via [facility]? Call   Best Call Back Number: [phone]  Additional Information: Pt is confirming that she can come on the 26th but she lyft services. She asked that you call her back.

## 2023-01-19 NOTE — TELEPHONE ENCOUNTER
Left message on voice mail.    ----- Message from Caitlin Echols sent at 1/19/2023  3:47 PM CST -----   Name of Who is Calling:     What is the request in detail:  patient request call back in reference to medical clearance Please contact to further discuss and advise      Can the clinic reply by [facility]:     What Number to Call Back if not in [facility]:  [phone]

## 2023-01-19 NOTE — TELEPHONE ENCOUNTER
Called pt to see if she would be willing to see Cardiology on 1/26 at 1pm.  Pt stated that she received a call from the cards office already to see her on 1/25 at 1pm instead. Pt will have her son bring her to the appt and understands how important this appt is to have before her sx with Dr. Cardozo on 2/6.  Pt verbalized understanding to all.

## 2023-01-24 NOTE — DISCHARGE INSTRUCTIONS
Your surgery has been scheduled for:_______2/6/2023___________________________________    You should report to:  ____[facility], located on the [place] side of the first floor of the           [facility] ([phone])  __X__[facility], located on the [facility] side of the            Second floor of the [facility] ([phone])  ____3rd Floor [facility] located on the [facility] side of the [facility] [phone]  ____[facility]: [facility] [address] [facility] [place] [address]. [address].     Please Note   Tell your doctor if you take Aspirin, products containing Aspirin, herbal medications  or blood thinners, such as Coumadin, Ticlid, or Plavix.  (Consult your provider regarding holding or stopping before surgery).  Arrange for someone to drive you home following surgery.  You will not be allowed to leave the surgical facility alone or drive yourself home following sedation and anesthesia.    Before Surgery  Stop taking all herbal medications, vitamins, and supplements 7 days prior to surgery  No Motrin/Advil (Ibuprofen) 7 days before surgery  No Aleve (Naproxen) 7 days before surgery  Stop Taking Asprin, products containing Asprin __7___days before surgery  Stop taking blood thinners___NA____days before surgery  No Goody's/BC  Powder 7 days before surgery  Refrain from drinking alcoholic beverages for 24hours before and after surgery  Stop or limit smoking ___7______days before surgery  You may take Tylenol for pain    Night before Surgery  Do not eat or drink after midnight  Take a shower or bath (shower is recommended).  Bathe with Hibiclens soap or an antibacterial soap from the neck down.  If not supplied by your surgeon, hibiclens soap will need to be purchased over the counter in pharmacy.  Rinse soap off thoroughly.  Shampoo your hair with your regular shampoo    The Day of  Surgery  Take another bath or shower with hibiclens or any antibacterial soap, to reduce the chance of infection.  Take heart and blood pressure medications with a small sip of water, as advised by the perioperative team.  Do not take fluid pills  You may brush your teeth and rinse your mouth, but do not swall any additional water.   Do not apply perfumes, powder, body lotions or deodorant on the day of surgery.  Nail polish should be removed.  Do not wear makeup or moisturizer  Wear comfortable clothes, such as a button front shirt and loose fitting pants.  Leave all jewelry, including body piercings, and valuables at home.    Bring any devices you will neeed after surgery such as crutches or canes.  If you have sleep apnea, please bring your CPAP machine  In the event that your physical condition changes including the onset of a cold or respiratory illness, or if you have to delay or cancel your surgery, please notify your surgeon.       Anesthesia: General Anesthesia     You are watched continuously during your procedure by your anesthesia provider.     Youre due to have surgery. During surgery, youll be given medicine called anesthesia or anesthetic. This will keep you comfortable and pain-free. Your anesthesia provider will use general anesthesia.  What is general anesthesia?  General anesthesia puts you into a state like deep sleep. It goes into the bloodstream (IV anesthetics), into the lungs (gas anesthetics), or both. You feel nothing during the procedure. You will not remember it. During the procedure, the anesthesia provider monitors you continuously. He or she checks your heart rate and rhythm, blood pressure, breathing, and blood oxygen.  IV anesthetics. IV anesthetics are given through an IV line in your arm. Theyre often given first. This is so you are asleep before a gas anesthetic is started. Some kinds of IV anesthetics relieve pain. Others relax you. Your doctor will decide which kind is best  in your case.  Gas anesthetics. Gas anesthetics are breathed into the lungs. They are often used to keep you asleep. They can be given through a facemask or a tube placed in your larynx or trachea (breathing tube).  If you have a facemask, your anesthesia provider will most likely place it over your nose and mouth while youre still awake. Youll breathe oxygen through the mask as your IV anesthetic is started. Gas anesthetic may be added through the mask.  If you have a tube in the larynx or trachea, it will be inserted into your throat after youre asleep.  Anesthesia tools and medicines  You will likely have:  IV anesthetics. These are put into an IV line into your bloodstream.  Gas anesthetics. You breathe these anesthetics into your lungs, where they pass into your bloodstream.  Pulse oximeter. This is a small clip that is attached to the end of your finger. This measures your blood oxygen level.  Electrocardiography leads (electrodes). These are small sticky pads that are placed on your chest. They record your heart rate and rhythm.  Blood pressure cuff. This reads your blood pressure.  Risks and possible complications  General anesthesia has some risks. These include:  Breathing problems  Nausea and vomiting  Sore throat or hoarseness (usually temporary)  Allergic reaction to the anesthetic  Irregular heartbeat (rare)  Cardiac arrest (rare)   Anesthesia safety  Follow all instructions you are given for how long not to eat or drink before your procedure.  Be sure your doctor knows what medicines and drugs you take. This includes over-the-counter medicines, herbs, supplements, alcohol or other drugs. You will be asked when those were last taken.  Have an adult family member or friend drive you home after the procedure.  For the first 24 hours after your surgery:  Do not drive or use heavy equipment.  Do not make important decisions or sign legal documents. If important decisions or signing legal documents is  necessary during the first 24 hours after surgery, have a trusted family member or spouse act on your behalf.  Avoid alcohol.  Have a responsible adult stay with you. He or she can watch for problems and help keep you safe.  Date Last Reviewed: 12/1/2016 © 2000-2017 [employer]. [address] [place] [address], [place] [address]. All rights reserved. This information is not intended as a substitute for professional medical care. Always follow your healthcare professional's instructions.

## 2023-01-24 NOTE — ANESTHESIA PAT ROS NOTE
01/24/2023  Odette Hart is a 67 y.o., female.      Pre-op Assessment          Review of Systems  Anesthesia Hx:    DIFFICULTY AROUSING    4/2014 [employer] AICD IMPLANTED     History of prior surgery of interest to airway management or planning:  Previous anesthesia: General 5/9/2022 COLECTOMY, SIGMOID (ERAS High, lithotomy) (Abdomen) with general anesthesia.       Airway issues documented on chart review include easy direct laryngoscopy     Denies Family Hx of Anesthesia complications.   Personal Hx of Anesthesia complications          Slow To Awaken/Delayed Emergence          Social:  Non-Smoker, No Alcohol Use QUIT SMOKING YEARS AGO      Hematology/Oncology:  Hematology Normal   Oncology Normal                Hematology Comments: LEUKOPENIA    NORMOCYTIC ANEMIA                    EENT/Dental:  EENT/Dental Normal           Cardiovascular:    Pacemaker Hypertension  Past MI CAD    Dysrhythmias   Denies Angina. CHF    hyperlipidemia LI   RIGHT BUNDLE BRANCH BLOCK    LEFT ARTERIAL FASCICULATED BLOCK Functional Capacity AMBULATES W SBA  Cardiovascular Symptoms:    Shortness of Breath, chronic Dyspnea On Extertion, chronic   Coronary Artery Disease:  hx of Percutaneous Coronary Intervention (PCI), hx of Coronary Revascularization.         Hx of Myocardial Infarction, MI was > 1 year ago, NSTEMI date of 1/24/2022 1/24/2022 CORONARY STENT IMPLANTED   Congestive Heart Failure (CHF)   , LV Diastolic HF                   Pulmonary:   COPD, mild Asthma asymptomatic Shortness of breath  Denies Recent URI.  Denies Sleep Apnea. SOB WHILE SITTING AND MINIMALLY TALKING. CHRONIC COUGH.               Renal/:     BILATERAL URETER STRICTURES             Hepatic/GI:     GERD, well controlled   ENTEROCUTANEOUS FISTULA; HERNIATED COLOSTOMY    5-9-22 COLECTOMY SIGMOID    5/9/22 CHOLECYSTECTOMY    FAILURE TO  THRIVE                Musculoskeletal:   Musculoskeletal General/Symptoms: muscle weakness, generalized.  MALNUTRITION INDUCED MUSCLE WASTING  Joint Disease:  Arthritis, Osteoarthritis      Cervical Spine Disorder, Cervical Disc Disease   Lumbar Spine Disorders, Lumbar Disc Disease   Neurological:  Denies TIA.  Denies CVA.    Denies Seizures.        Osteoarthritis                           Endocrine:     LOSING TEETH R/T MALNUTRITION      Metabolic Disorders, Malnutrition  Dermatological:  Skin Normal    Psych:  Psychiatric History anxiety             Physical Exam  General: Cooperative, Alert and Oriented  MALNUTRITION; TEETH LOSS  Airway:  Mouth Opening: Normal  Tongue: Normal  Neck ROM: Normal ROM    Dental:  Intact  MISSING BACK TEETH  Chest/Lungs:  Clear to auscultation, Normal Respiratory Rate    Heart:  Rate: Normal  Rhythm: Regular Rhythm  Sounds: Normal      1/24/2023 CARDIOLOGY CLEARANCE received from Dr. Cordova and scanned into MEDIA.      MEDICAL CLEARANCE FROM DR. KATE Barragan MD  Nurse Baldev Gottlieb, RN  Caller: Unspecified (Yesterday,  6:52 AM)  Pt already cleared by cardiology with high risk due to severe cardiomyopathy . I am also cleared her for surgery with high risk too and pt is willing to take this risk as I discuss with her during last visit look like other systems are doing well .     1/25/2023 Pre Operative Cardiovascular Evaluation               2/6/2023: Plan is surgery of colostomy. Very frail cardiac status. High risk. She needs to discontinue clopidogrel 7 days prior. She should stay on aspirin. Hold metoprolol day of surgery. Magnet over ICD.              Dr. Rafa Cardozo.     F/u 4 weeks.     Yohannes Cordova M.D.    1/27/2023 PATIENT CONFIRMS HOLDING METOPROLOL THE DAY OF SURGERY.  BALDEV GOTTLIEB, RN  ANESTHESIA CLINICIAN

## 2023-01-25 PROBLEM — Z01.810 PRE-OPERATIVE CARDIOVASCULAR EXAMINATION: Status: ACTIVE | Noted: 2023-01-01

## 2023-01-25 PROBLEM — E78.00 HYPERCHOLESTEROLEMIA: Status: ACTIVE | Noted: 2022-01-22

## 2023-01-25 NOTE — TELEPHONE ENCOUNTER
Patient was called and scheduled for her Medical Clearance on 1/27/2023 at 3:45 pm. Patient also requested a ride.

## 2023-01-25 NOTE — TELEPHONE ENCOUNTER
Called and spoke to pt to offer consult appt with Dr Sanchez on 1/31 or 02/02- Pt states will call me back once talks with her son who will be her ride to the appt.and confirm - No additional concerns at this time -

## 2023-01-25 NOTE — TELEPHONE ENCOUNTER
----- Message from Nurse Merced Byrne RN sent at 1/25/2023  6:52 AM CST -----  Regarding: MEDICAL CLEARANCE  Good Morning!    Above patient was seen in Preop Center 1/24/2023 Tuesday. Medical Clearance remains pending.  Surgery is 2/6/2023. Her Epic Schedule does not indicate a visit with Dr. Barragan to complete Medical Clearance. Can an earlier visit be set to meet her clearance?    Thank you,  Merced Byrne RN  Anesthesia Clinician  Desk
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
## 2023-01-25 NOTE — TELEPHONE ENCOUNTER
Received pts call back - pt confirmed preop /consult appt and post op appt with Dr Sanchez - Details of loactiona and times provided - Discussed with pt to hold Plavix x 7 days and to speak with Dr strong and Cardiology about holding Aspirin - Pt voiced understanding - No additional concerns at this time.

## 2023-01-30 NOTE — TELEPHONE ENCOUNTER
----- Message from Jeremy Mercado RN sent at 1/30/2023 11:07 AM CST -----  Regarding: TRAMADOL  Contact: [facility]    ----- Message -----  From: Concha Galdamez  Sent: 1/30/2023  10:25 AM CST  To: Juliane Craig Staff    Refill request.    Pain meds, pt is in a lot of pain would like something called in soon as possible   traMADoL (ULTRAM) 50 mg tablet was called in my pcp but she states that's not helping with pain      [place] [phone] - [place] - [address] [facility]  [address]  [place] [id number]  Phone: [phone] Fax: [phone]

## 2023-01-30 NOTE — PROGRESS NOTES
Subjective:       Patient ID: Odette Hart is a 67 y.o. female.    Chief Complaint: Pre-op Exam and Medication Refill (Asked for refill of Tramadol)    HPI  Pt visit today for f/u and preop for colostomy revision the intestinal hernia is getting bigger more pain and the colostomy bag can not cover it any more stool is leaking out she is scheduled for surgery in about 1 week she ha ssvere cardiomyopathy with EF 10% and s/p defibrillator placement she denies any h/o bleeding disorder she is on asa and plavix which will be stopped one 1 before surgery she had stent in RCA and already cleared for surgery by cardiology with high risk  she has LI on minimal to moderate exertion no edema no pulmonary ds no kidney ds her RA is stable she is very depressed from from the hernia cuasing stool leaking and she can't go any where. She also c/o chronic rt shoulder pain request refill on her medication  Review of Systems    Objective:      Physical Exam  Vitals and nursing note reviewed.   Constitutional:       General: She is not in acute distress.     Appearance: She is well-developed.   HENT:      Head: Normocephalic and atraumatic.      Right Ear: External ear normal.      Left Ear: External ear normal.      Nose: Nose normal.   Eyes:      Conjunctiva/sclera: Conjunctivae normal.      Pupils: Pupils are equal, round, and reactive to light.   Neck:      Thyroid: No thyromegaly.   Cardiovascular:      Rate and Rhythm: Normal rate and regular rhythm.      Heart sounds: Normal heart sounds. No murmur heard.    No friction rub. No gallop.   Pulmonary:      Effort: Pulmonary effort is normal. No respiratory distress.      Breath sounds: Normal breath sounds. No wheezing or rales.   Chest:      Chest wall: No tenderness.   Abdominal:      General: Bowel sounds are normal. There is no distension.      Palpations: Abdomen is soft.      Tenderness: There is abdominal tenderness.      Comments: Large protruding ileum outside the  abd wall tender and colostomy bag leaking    Musculoskeletal:         General: No tenderness or deformity. Normal range of motion.      Cervical back: Normal range of motion and neck supple.   Lymphadenopathy:      Cervical: No cervical adenopathy.   Skin:     General: Skin is warm and dry.      Capillary Refill: Capillary refill takes less than 2 seconds.      Findings: No erythema or rash.   Neurological:      Mental Status: She is alert and oriented to person, place, and time.   Psychiatric:         Thought Content: Thought content normal.         Judgment: Judgment normal.      Comments: Depressed        Assessment:       1. Preoperative clearance    2. Nausea    3. Shoulder pain, unspecified chronicity, unspecified laterality    4. FTT (failure to thrive) in adult    5. Chronic combined systolic and diastolic congestive heart failure    6. Depression, unspecified depression type          Plan:       Preoperative clearance  Comments:  pt is medically cleared for surgery under generalized anesthesia with high risk due to severe CHF try avoid fluid overload and need diuretic post op    Nausea  -     ondansetron (ZOFRAN-ODT) 8 MG TbDL; Take 1 tablet (8 mg total) by mouth every 12 (twelve) hours as needed (nausea).  Dispense: 20 tablet; Refill: 2    Shoulder pain, unspecified chronicity, unspecified laterality  -     traMADoL (ULTRAM) 50 mg tablet; Take 1 tablet (50 mg total) by mouth every 6 (six) hours as needed for Pain.  Dispense: 30 tablet; Refill: 0    FTT (failure to thrive) in adult  Comments:  still with low albumin but better    Chronic combined systolic and diastolic congestive heart failure  Comments:  continue with tx as per cardiology need watch for fluid overload    Depression, unspecified depression type  Comments:  reactive depression from medical illnesses will start on SSRI after surgery        Medication List with Changes/Refills   Current Medications    ACETAMINOPHEN (TYLENOL) 500 MG TABLET     Take 2 tablets (1,000 mg total) by mouth every 8 (eight) hours as needed for Pain.    ALBUTEROL (PROVENTIL/VENTOLIN HFA) 90 MCG/ACTUATION INHALER    Inhale 2 puffs into the lungs every 6 (six) hours as needed for Wheezing. Rescue    ALPRAZOLAM (XANAX) 0.5 MG TABLET    Take 1 tablet (0.5 mg total) by mouth nightly as needed for Anxiety.    ASPIRIN (ECOTRIN) 81 MG EC TABLET    Take 1 tablet (81 mg total) by mouth once daily.    CLOPIDOGREL (PLAVIX) 75 MG TABLET    Take 1 tablet (75 mg total) by mouth once daily.    DOCUSATE SODIUM (COLACE) 100 MG CAPSULE    Take 1 capsule (100 mg total) by mouth 2 (two) times daily.    EMPAGLIFLOZIN (JARDIANCE) 10 MG TABLET    Take 1 tablet (10 mg total) by mouth once daily.    EZETIMIBE (ZETIA) 10 MG TABLET    Take 1 tablet (10 mg total) by mouth once daily.    FAMOTIDINE (PEPCID) 20 MG TABLET    Take 1 tablet (20 mg total) by mouth once daily.    FLUTICASONE PROPIONATE (FLONASE) 50 MCG/ACTUATION NASAL SPRAY    1 spray (50 mcg total) by Each Nostril route once daily.    FOOD SUPPLEMT, LACTOSE-REDUCED (ENSURE HIGH PROTEIN) LIQD    Take 240 mLs by mouth 2 (two) times a day.    FUROSEMIDE (LASIX) 40 MG TABLET    Take 1 tablet (40 mg total) by mouth once daily.    LOSARTAN (COZAAR) 25 MG TABLET    Take 1 tablet (25 mg total) by mouth once daily.    METOPROLOL SUCCINATE (TOPROL-XL) 25 MG 24 HR TABLET    Take 1 tablet (25 mg total) by mouth once daily.    NITROGLYCERIN (NITROSTAT) 0.4 MG SL TABLET    Place 1 tablet (0.4 mg total) under the tongue every 5 (five) minutes as needed for Chest pain.    SIMETHICONE (MYLICON) 80 MG CHEWABLE TABLET    Take 1 tablet (80 mg total) by mouth every 8 (eight) hours as needed for Flatulence.    SPIRONOLACTONE (ALDACTONE) 25 MG TABLET    Take 0.5 tablets (12.5 mg total) by mouth once daily.   Changed and/or Refilled Medications    Modified Medication Previous Medication    ONDANSETRON (ZOFRAN-ODT) 8 MG TBDL ondansetron (ZOFRAN-ODT) 8 MG TbDL       Take 1  tablet (8 mg total) by mouth every 12 (twelve) hours as needed (nausea).    Take 1 tablet (8 mg total) by mouth every 12 (twelve) hours as needed (nausea).    TRAMADOL (ULTRAM) 50 MG TABLET traMADoL (ULTRAM) 50 mg tablet       Take 1 tablet (50 mg total) by mouth every 6 (six) hours as needed for Pain.    Take 1 tablet (50 mg total) by mouth every 4 (four) hours as needed for Pain.   Discontinued Medications    COLLAGENASE (SANTYL) OINTMENT    Apply topically once daily.    PROMETHAZINE (PHENERGAN) 25 MG TABLET    Take 1 tablet (25 mg total) by mouth every 6 (six) hours as needed for Nausea.    PSYLLIUM HUSK, ASPARTAME, (METAMUCIL) 3.4 GRAM PWPK PACKET    Take 1 packet by mouth once daily.

## 2023-01-31 NOTE — PROGRESS NOTES
"Brought by son who called with concerns about color of stoma,  seen in office and Dr Cardozo present     One limb reduced     Both limbs reduced.   Placed a 4 " pouch on and a firm ABD BINDER  to help prevent prolapse .  Pt to be direct admitted.     "

## 2023-01-31 NOTE — TELEPHONE ENCOUNTER
Spoke with pt states they are running late - son was in accident. Informed pt ok to be seen today in clinic.    ----- Message from Pamela Kaiser sent at 1/31/2023  8:21 AM CST -----  Contact: Patient, [phone]  Calling to speak with the nurse regarding her appointment this morning. Please call her. Thanks.

## 2023-01-31 NOTE — ASSESSMENT & PLAN NOTE
Previously discussed this patient with Dr. Cardozo.  She is a complicated history of diverticulitis.  She developed a colocutaneous fistula.  Being seen today she is prolapse loop of bowel through this.  She describes obstructive symptoms.  It is painful the bowel is edematous.  She saw Dr. Butler immediately after my visit, he was able to reduce his sleep of small bowel but will keep her in water until her scheduled surgery.      On Monday we were already planning a takedown of colocutaneous fistula with complex abdominal wall reconstruction.  I discussed the risk of this with the patient.  Her current fistulas a significant hindrance to her quality of life.  She understands the risk besides her poor cardiac function test.  She did previously tolerate her last large operation and  has been doing much better since then in terms of level in symptoms.    The patient was consented for a complex abdominal wall reconstruction. Will Likely perform component separation.  Discussed the possible use of mesh the risks of infection.  Also discussed the typical risks including bleeding infection mesh infection postoperative hernia formation need for further surgeries risk of bowel injury and fistula formation.  Specifically to her we discussed the cardiac risk and risk of anesthesia.  Patient voices her understanding.  Photographic and surgical consents were signed.  We also discussed that if she was instructed dilated that I would not perform abdominal wall reconstruction at the time of her operation.  This would be delayed until her small bowel dilation had resolved.

## 2023-01-31 NOTE — PROGRESS NOTES
Plastic Surgery History & Physical    SUBJECTIVE:   Chief complaint: colocutaneous fistula    History of Present Illness:  67 y.o. female with a history of complicated diverticulitis.  She has earlier undergone a takedown of colovaginal and colovesical fistula.  This was complicated by a postoperative fistula formation.  She also experienced heart failure exacerbation postoperatively.  She has a history of an MI with depressed systolic and diastolic function.  She is now been dealing with this colocutaneous fistula.  Significantly impacting her quality of life.  She intermittently has pain from this.  She also is having trouble healing the wound and has poor nutrition.  She has seen cardiology for preoperative risk stratification as well as recommendations for her management.  The patient understands her risks and has been struggling with her wound management    Past Medical History:   Diagnosis Date    Acute coronary syndrome     Acute exacerbation of chronic obstructive pulmonary disease (COPD) 3/23/2022    Allergy     Arthritis     Cardiomyopathy     CHF (congestive heart failure)     Coronary artery disease     Coronary artery disease of native artery of native heart with stable angina pectoris 4/19/2021 1/24/2022: [facility]: Cath: LAD: Proximal stent patent. LCX: Proximal 80%. LCX: Dominant. Moderate disease. LCX: HEVER 2.75 x 18 mm. Distal dissection. HEVER 2.75 x 22 mm.     Diverticulitis     Diverticulosis     Familial hypercholesterolemia 1/22/2022    Former smoker 1/24/2022    Heart attack     Heart disease     History of myocardial infarction 1/24/2022    Hyperlipidemia     Hypertension     Hypertension 1/24/2022    ICD (implantable cardioverter-defibrillator) in place     Non-ST elevation myocardial infarction (NSTEMI) 2/4/2019       Past Surgical History:   Procedure Laterality Date    APPENDECTOMY N/A 5/9/2022    Procedure: APPENDECTOMY;  Surgeon: Rafa Cardozo MD;  Location: [facility] OR [address] [place] [address];   Service: Colon and Rectal;  Laterality: N/A;    ATRIAL CARDIAC PACEMAKER INSERTION      CECECTOMY N/A 2022    Procedure: EXCISION, CECUM;  Surgeon: Rafa Cardozo MD;  Location: [facility] OR 2ND FLR;  Service: Colon and Rectal;  Laterality: N/A;     SECTION      CHOLECYSTECTOMY N/A 2022    Procedure: CHOLECYSTECTOMY;  Surgeon: Doug Epstein MD;  Location: [facility] OR [facility] FLR;  Service: General;  Laterality: N/A;    COLONOSCOPY N/A 2022    Procedure: COLONOSCOPY;  Surgeon: Rafa Cardozo MD;  Location: [facility] OR 2ND FLR;  Service: Colon and Rectal;  Laterality: N/A;    COLOSTOMY  2022    Procedure: CREATION, COLOSTOMY;  Surgeon: Rafa Cardozo MD;  Location: [facility] OR 2ND FLR;  Service: Colon and Rectal;;    CORONARY STENT PLACEMENT      LEFT HEART CATHETERIZATION Left 2022    Procedure: CATHETERIZATION, HEART, LEFT;  Surgeon: Yohannes Cordova MD;  Location: [facility] CATH LAB;  Service: Cardiology;  Laterality: Left;       Family History   Problem Relation Age of Onset    Heart disease Mother     Emphysema Father     Heart attack Brother        Social History     Socioeconomic History    Marital status:    Tobacco Use    Smoking status: Former     Packs/day: 0.50     Types: Cigarettes     Start date: 1976     Quit date: 2012     Years since quitting: 10.1    Smokeless tobacco: Never   Substance and Sexual Activity    Alcohol use: No    Drug use: No     Social Determinants of Health     Financial Resource Strain: Low Risk     Difficulty of Paying Living Expenses: Not hard at all   Food Insecurity: No Food Insecurity    Worried About Running Out of Food in the Last Year: Never true    Ran Out of Food in the Last Year: Never true   Transportation Needs: No Transportation Needs    Lack of Transportation (Medical): No    Lack of Transportation (Non-Medical): No   Physical Activity: Inactive    Days of Exercise per Week: 0 days    Minutes of Exercise per Session: 0 min    Stress: No Stress Concern Present    Feeling of Stress : Not at all   Social Connections: Moderately Integrated    Frequency of Communication with Friends and Family: More than three times a week    Frequency of Social Gatherings with Friends and Family: More than three times a week    Attends [employer] Services: 1 to 4 times per year    Active Member of Clubs or Organizations: Yes    Attends Club or Organization Meetings: 1 to 4 times per year    Marital Status:    Housing Stability: Low Risk     Unable to Pay for Housing in the Last Year: No    Number of Places Lived in the Last Year: 1    Unstable Housing in the Last Year: No       Current Outpatient Medications   Medication Sig Dispense Refill    acetaminophen (TYLENOL) 500 MG tablet Take 2 tablets (1,000 mg total) by mouth every 8 (eight) hours as needed for Pain.  0    albuterol (PROVENTIL/VENTOLIN HFA) 90 mcg/actuation inhaler Inhale 2 puffs into the lungs every 6 (six) hours as needed for Wheezing. Rescue (Patient not taking: Reported on 1/25/2023) 18 g 1    ALPRAZolam (XANAX) 0.5 MG tablet Take 1 tablet (0.5 mg total) by mouth nightly as needed for Anxiety. 30 tablet 1    aspirin (ECOTRIN) 81 MG EC tablet Take 1 tablet (81 mg total) by mouth once daily. 90 tablet 3    clopidogreL (PLAVIX) 75 mg tablet Take 1 tablet (75 mg total) by mouth once daily. 90 tablet 3    docusate sodium (COLACE) 100 MG capsule Take 1 capsule (100 mg total) by mouth 2 (two) times daily. 60 capsule 1    empagliflozin (JARDIANCE) 10 mg tablet Take 1 tablet (10 mg total) by mouth once daily. 90 tablet 3    ezetimibe (ZETIA) 10 mg tablet Take 1 tablet (10 mg total) by mouth once daily. 90 tablet 3    famotidine (PEPCID) 20 MG tablet Take 1 tablet (20 mg total) by mouth once daily. 30 tablet 0    fluticasone propionate (FLONASE) 50 mcg/actuation nasal spray 1 spray (50 mcg total) by Each Nostril route once daily. 9.9 mL 1    food supplemt, lactose-reduced (ENSURE HIGH PROTEIN)  Liqd Take 240 mLs by mouth 2 (two) times a day. 60 each 3    furosemide (LASIX) 40 MG tablet Take 1 tablet (40 mg total) by mouth once daily. 120 tablet 3    HYDROcodone-acetaminophen (NORCO) 5-325 mg per tablet Take 1 tablet by mouth every 8 (eight) hours as needed for Pain. 20 tablet 0    losartan (COZAAR) 25 MG tablet Take 1 tablet (25 mg total) by mouth once daily. 90 tablet 3    metoprolol succinate (TOPROL-XL) 25 MG 24 hr tablet Take 1 tablet (25 mg total) by mouth once daily. 90 tablet 3    nitroGLYCERIN (NITROSTAT) 0.4 MG SL tablet Place 1 tablet (0.4 mg total) under the tongue every 5 (five) minutes as needed for Chest pain. (Patient not taking: Reported on 1/27/2023) 25 tablet 3    ondansetron (ZOFRAN-ODT) 8 MG TbDL Take 1 tablet (8 mg total) by mouth every 12 (twelve) hours as needed (nausea). 20 tablet 2    simethicone (MYLICON) 80 MG chewable tablet Take 1 tablet (80 mg total) by mouth every 8 (eight) hours as needed for Flatulence. 120 tablet 5    spironolactone (ALDACTONE) 25 MG tablet Take 0.5 tablets (12.5 mg total) by mouth once daily. 90 tablet 3     No current facility-administered medications for this visit.     Facility-Administered Medications Ordered in Other Visits   Medication Dose Route Frequency Provider Last Rate Last Admin    acetaminophen tablet 1,000 mg  1,000 mg Oral Q8H PRN Pablo Ramirez MD        albuterol inhaler 2 puff  2 puff Inhalation Q6H PRN Pablo Ramirez MD        ALPRAZolam tablet 0.5 mg  0.5 mg Oral Nightly PRN Pablo Ramirez MD        [START ON 2/1/2023] aspirin EC tablet 81 mg  81 mg Oral Daily Pablo Ramirez MD        [START ON 2/1/2023] enoxaparin injection 40 mg  40 mg Subcutaneous Daily Pablo Ramirez MD        [START ON 2/1/2023] ezetimibe tablet 10 mg  10 mg Oral Daily Pablo Ramirez MD        famotidine tablet 20 mg  20 mg Oral Daily Pablo Ramirez MD        fluticasone propionate 50 mcg/actuation nasal spray 50 mcg  1 spray Each Nostril  "Daily PRN Pablo Ramirez MD        [START ON 2/1/2023] furosemide tablet 40 mg  40 mg Oral Daily Pablo Ramirez MD        [START ON 2/1/2023] losartan tablet 25 mg  25 mg Oral Daily Pablo Ramirez MD        [START ON 2/1/2023] metoprolol succinate (TOPROL-XL) 24 hr tablet 25 mg  25 mg Oral Daily Pablo Ramirez MD        naloxone 0.4 mg/mL injection 0.02 mg  0.02 mg Intravenous PRN Pablo Ramirez MD        nitroGLYCERIN SL tablet 0.4 mg  0.4 mg Sublingual Q5 Min PRN Pablo Ramirez MD        ondansetron injection 4 mg  4 mg Intravenous Q6H PRN Pablo Ramirez MD        oxyCODONE immediate release tablet 5 mg  5 mg Oral Q4H PRN Pablo Ramirez MD        polyethylene glycol packet 17 g  17 g Oral BID Pablo Ramirez MD        prochlorperazine injection Soln 5 mg  5 mg Intravenous Q6H PRN Pablo Ramirez MD        sodium chloride 0.9% flush 10 mL  10 mL Intravenous PRN Pablo Ramirez MD        [START ON 2/1/2023] spironolactone split tablet 12.5 mg  12.5 mg Oral Daily Pablo Ramirez MD           Review of patient's allergies indicates:   Allergen Reactions    Augmentin [amoxicillin-pot clavulanate] Swelling     Not allergic to amoxicillin just a derivative of augmentin    Lisinopril Other (See Comments)     Fluid around heart    Shellfish containing products Anaphylaxis     Pt.states she is allergic to SEAFOOD since the age of 13    Levaquin [levofloxacin] Other (See Comments)     Very bad joint pain    Clindamycin Palpitations     Chest pain         Review of Systems:  Review of Systems   Constitutional:  Positive for fatigue. Negative for fever.   Respiratory:  Positive for chest tightness and shortness of breath.    Gastrointestinal:  Positive for abdominal distention and abdominal pain.   Skin:  Positive for wound.         OBJECTIVE:     BP (!) 95/54 (BP Location: Left arm, Patient Position: Sitting, BP Method: Medium (Automatic))   Pulse 77   Ht 5' 3" (1.6 m)   Wt 55.3 kg (122 lb)  "  LMP  (LMP Unknown)   BMI 21.61 kg/m²       Physical Exam:  Gen: NAD, appears stated age  Neuro: normal without focal findings, mental status and speech normal  HEENT: NCAT, neck supple, PEERL  CV: RRR  Pulm: Breathing non-labored, chest wall movement equal bilaterally   Abdomen:  Abdomen tender with moderate distention.  She has a edematous, dilated loop of bowel herniated through her fistula defect.  There was some liquid stool in ostomy bag around this  Extremity:normal strength, no cyanosis or edema.  She is weak and needs help getting up from a chair  Skin:  Midline abdominal wound otherwise no rashes bruising  Psych: oriented to time, place and person, mood and affect are within normal limits      Pre-albumin 10  All of her other recent labs as well as a December CT scan were reviewed    ASSESSMENT/PLAN:     Colocutaneous fistula  Previously discussed this patient with Dr. Cardozo.  She is a complicated history of diverticulitis.  She developed a colocutaneous fistula.  Being seen today she is prolapse loop of bowel through this.  She describes obstructive symptoms.  It is painful the bowel is edematous.  She saw Dr. Butler immediately after my visit, he was able to reduce his sleep of small bowel but will keep her in water until her scheduled surgery.      On Monday we were already planning a takedown of colocutaneous fistula with complex abdominal wall reconstruction.  I discussed the risk of this with the patient.  Her current fistulas a significant hindrance to her quality of life.  She understands the risk besides her poor cardiac function test.  She did previously tolerate her last large operation and  has been doing much better since then in terms of level in symptoms.    The patient was consented for a complex abdominal wall reconstruction. Will Likely perform component separation.  Discussed the possible use of mesh the risks of infection.  Also discussed the typical risks including bleeding  infection mesh infection postoperative hernia formation need for further surgeries risk of bowel injury and fistula formation.  Specifically to her we discussed the cardiac risk and risk of anesthesia.  Patient voices her understanding.  Photographic and surgical consents were signed.  We also discussed that if she was instructed dilated that I would not perform abdominal wall reconstruction at the time of her operation.  This would be delayed until her small bowel dilation had resolved.    Also discussed the addition of TPN for the patient's malnutrition for while she is admitted before surgery.    Chilo Sanchez, DO  Plastic and Reconstructive Surgery    I spent a total of 60 minutes on the day of the visit.This includes face to face time and non-face to face time preparing to see the patient (eg, review of tests), obtaining and/or reviewing separately obtained history, documenting clinical information in the electronic or other health record, independently interpreting results and communicating results to the patient/family/caregiver, or care coordinator.

## 2023-02-01 NOTE — SUBJECTIVE & OBJECTIVE
Past Medical History:   Diagnosis Date    Acute coronary syndrome     Acute exacerbation of chronic obstructive pulmonary disease (COPD) 3/23/2022    Allergy     Arthritis     Cardiomyopathy     CHF (congestive heart failure)     Coronary artery disease     Coronary artery disease of native artery of native heart with stable angina pectoris 2021: [facility]: Cath: LAD: Proximal stent patent. LCX: Proximal 80%. LCX: Dominant. Moderate disease. LCX: HEVER 2.75 x 18 mm. Distal dissection. HEVER 2.75 x 22 mm.     Diverticulitis     Diverticulosis     Familial hypercholesterolemia 2022    Former smoker 2022    Heart attack     Heart disease     History of myocardial infarction 2022    Hyperlipidemia     Hypertension     Hypertension 2022    ICD (implantable cardioverter-defibrillator) in place     Non-ST elevation myocardial infarction (NSTEMI) 2019       Past Surgical History:   Procedure Laterality Date    APPENDECTOMY N/A 2022    Procedure: APPENDECTOMY;  Surgeon: Rafa Cardozo MD;  Location: [facility];  Service: Colon and Rectal;  Laterality: N/A;    ATRIAL CARDIAC PACEMAKER INSERTION      CECECTOMY N/A 2022    Procedure: EXCISION, CECUM;  Surgeon: Rafa Cardozo MD;  Location: [facility];  Service: Colon and Rectal;  Laterality: N/A;     SECTION      CHOLECYSTECTOMY N/A 2022    Procedure: CHOLECYSTECTOMY;  Surgeon: Doug Epstein MD;  [facility]: [facility];  Service: General;  Laterality: N/A;    COLONOSCOPY N/A 2022    Procedure: COLONOSCOPY;  Surgeon: Rafa Cardozo MD;  [facility]: [facility];  Service: Colon and Rectal;  Laterality: N/A;    COLOSTOMY  2022    Procedure: CREATION, COLOSTOMY;  Surgeon: Rafa Cardozo MD;  Location: [facility];  Service: Colon and Rectal;;    CORONARY STENT PLACEMENT      LEFT HEART CATHETERIZATION Left 2022    Procedure: CATHETERIZATION, HEART, LEFT;  Surgeon: Yohannes  MD Tasha;  Location: [facility] CATH LAB;  Service: Cardiology;  Laterality: Left;       Review of patient's allergies indicates:   Allergen Reactions    Augmentin [amoxicillin-pot clavulanate] Swelling     Not allergic to amoxicillin just a derivative of augmentin    Lisinopril Other (See Comments)     Fluid around heart    Shellfish containing products Anaphylaxis     Pt.states she is allergic to SEAFOOD since the age of 13    Levaquin [levofloxacin] Other (See Comments)     Very bad joint pain    Clindamycin Palpitations     Chest pain       No current facility-administered medications on file prior to encounter.     Current Outpatient Medications on File Prior to Encounter   Medication Sig    acetaminophen (TYLENOL) 500 MG tablet Take 2 tablets (1,000 mg total) by mouth every 8 (eight) hours as needed for Pain.    albuterol (PROVENTIL/VENTOLIN HFA) 90 mcg/actuation inhaler Inhale 2 puffs into the lungs every 6 (six) hours as needed for Wheezing. Rescue (Patient not taking: Reported on 1/25/2023)    ALPRAZolam (XANAX) 0.5 MG tablet Take 1 tablet (0.5 mg total) by mouth nightly as needed for Anxiety.    aspirin (ECOTRIN) 81 MG EC tablet Take 1 tablet (81 mg total) by mouth once daily.    clopidogreL (PLAVIX) 75 mg tablet Take 1 tablet (75 mg total) by mouth once daily.    docusate sodium (COLACE) 100 MG capsule Take 1 capsule (100 mg total) by mouth 2 (two) times daily.    empagliflozin (JARDIANCE) 10 mg tablet Take 1 tablet (10 mg total) by mouth once daily.    ezetimibe (ZETIA) 10 mg tablet Take 1 tablet (10 mg total) by mouth once daily.    famotidine (PEPCID) 20 MG tablet Take 1 tablet (20 mg total) by mouth once daily.    fluticasone propionate (FLONASE) 50 mcg/actuation nasal spray 1 spray (50 mcg total) by Each Nostril route once daily.    food supplemt, lactose-reduced (ENSURE HIGH PROTEIN) Liqd Take 240 mLs by mouth 2 (two) times a day.    furosemide (LASIX) 40 MG tablet Take 1 tablet (40 mg total) by  mouth once daily.    HYDROcodone-acetaminophen (NORCO) 5-325 mg per tablet Take 1 tablet by mouth every 8 (eight) hours as needed for Pain.    losartan (COZAAR) 25 MG tablet Take 1 tablet (25 mg total) by mouth once daily.    metoprolol succinate (TOPROL-XL) 25 MG 24 hr tablet Take 1 tablet (25 mg total) by mouth once daily.    nitroGLYCERIN (NITROSTAT) 0.4 MG SL tablet Place 1 tablet (0.4 mg total) under the tongue every 5 (five) minutes as needed for Chest pain. (Patient not taking: Reported on 1/27/2023)    ondansetron (ZOFRAN-ODT) 8 MG TbDL Take 1 tablet (8 mg total) by mouth every 12 (twelve) hours as needed (nausea).    simethicone (MYLICON) 80 MG chewable tablet Take 1 tablet (80 mg total) by mouth every 8 (eight) hours as needed for Flatulence.    spironolactone (ALDACTONE) 25 MG tablet Take 0.5 tablets (12.5 mg total) by mouth once daily.    [DISCONTINUED] pantoprazole (PROTONIX) 40 MG tablet Take 1 tablet (40 mg total) by mouth once daily.    [DISCONTINUED] prasugreL (EFFIENT) 10 mg Tab Take 1 tablet (10 mg total) by mouth once daily.     Family History       Problem Relation (Age of Onset)    Emphysema Father    Heart attack Brother    Heart disease Mother          Tobacco Use    Smoking status: Former     Packs/day: 0.50     Types: Cigarettes     Start date: 2/4/1976     Quit date: 12/4/2012     Years since quitting: 10.1    Smokeless tobacco: Never   Substance and Sexual Activity    Alcohol use: No    Drug use: No    Sexual activity: Not on file     Review of Systems   Constitutional: Negative.   HENT: Negative.     Eyes: Negative.    Cardiovascular: Negative.    Respiratory: Negative.     Endocrine: Negative.    Hematologic/Lymphatic: Negative.    Skin: Negative.    Musculoskeletal: Negative.    Gastrointestinal: Negative.    Genitourinary: Negative.    Neurological: Negative.    Objective:     Vital Signs (Most Recent):  Temp: 98.4 °F (36.9 °C) (02/01/23 0736)  Pulse: 67 (02/01/23 0736)  Resp: 18  (02/01/23 0736)  BP: (!) 93/52 (02/01/23 0736)  SpO2: 95 % (02/01/23 0736)   Vital Signs (24h Range):  Temp:  [97.1 °F (36.2 °C)-98.4 °F (36.9 °C)] 98.4 °F (36.9 °C)  Pulse:  [67-79] 67  Resp:  [16-20] 18  SpO2:  [92 %-99 %] 95 %  BP: ()/(52-62) 93/52     Weight: 56.2 kg (124 lb)  Body mass index is 21.97 kg/m².    SpO2: 95 %         Intake/Output Summary (Last 24 hours) at 2/1/2023 1002  Last data filed at 2/1/2023 0910  Gross per 24 hour   Intake --   Output 150 ml   Net -150 ml       Lines/Drains/Airways       Drain  Duration                  Colostomy 05/09/22 1100 Descending/sigmoid  days              Peripheral Intravenous Line  Duration                  Peripheral IV - Single Lumen 01/31/23 1530 20 G Left;Posterior Forearm <1 day                    Physical Exam  Constitutional:       Appearance: Normal appearance.   HENT:      Head: Normocephalic and atraumatic.   Eyes:      Pupils: Pupils are equal, round, and reactive to light.   Neck:      Comments: Elevated JVP up to 16 cm of water  Cardiovascular:      Rate and Rhythm: Normal rate and regular rhythm.      Comments: S3 present   Pulmonary:      Effort: Pulmonary effort is normal.      Breath sounds: Normal breath sounds.   Abdominal:      General: Abdomen is flat. Bowel sounds are normal.      Palpations: Abdomen is soft.   Musculoskeletal:         General: Normal range of motion.      Cervical back: Normal range of motion.   Skin:     General: Skin is warm.      Capillary Refill: Capillary refill takes less than 2 seconds.   Neurological:      General: No focal deficit present.      Mental Status: She is alert and oriented to person, place, and time.       Significant Labs: All pertinent lab results from the last 24 hours have been reviewed.

## 2023-02-01 NOTE — ASSESSMENT & PLAN NOTE
History of anterior wall MI with LAD stent in 2012 and circumflex stent in January 2022.  Continue aspirin   Lipitor,  Ezetimibe  Plavix on hold for surgery

## 2023-02-01 NOTE — PLAN OF CARE
Pt arrived as direct admit Aox4. VSS. No signs of respiratory distress on RA. Colostomy  in place Pt remained injury free through shift. Will continue POC.     yes...

## 2023-02-01 NOTE — H&P
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 female well known to the CRS service with history of HTN, CAD s/p PCI c/b HFrEF (EF 10%), MDD, debility, severe malnutrition, and open cholecystectomy and takedown of colovesical and colovaginal fistuli (two separate areas) with sigmoid colectomy, partial cecectomy and appendectomy, drainage of intra-abdominal abscess and end colostomy 5/9/2022 recently admitted 12/22/22 - 12/30/22 for new colocutaneous fistula who presents with incarcerated colonic prolapse from her fistula (see pictures below). Ms. Hart has had progressive prolapse of her colon (both proximal/distal) from the site of her colocutaneous fistula. She has been able to reduce the limbs but became unable to reduce the limbs 1/27/23; she developed associated nausea/vomiting, abdominal pain and increasingly dusky colonic mucosa since symptom onset prompting presentation to CRS clinic today. Her prolapse was able to be manually reduced in clinic with a stoma and abdominal binder placed to prevent recurrent prolapse. She is admitted for monitoring given the relative mucosal ischemia she had on exam.    PAST MEDICAL HISTORY:  Past Medical History:   Diagnosis Date    Acute coronary syndrome     Acute exacerbation of chronic obstructive pulmonary disease (COPD) 3/23/2022    Allergy     Arthritis     Cardiomyopathy     CHF (congestive heart failure)     Coronary artery disease     Coronary artery disease of native artery of native heart with stable angina pectoris 4/19/2021 1/24/2022: [facility]: Cath: LAD: Proximal stent patent. LCX: Proximal 80%. LCX: Dominant. Moderate disease. LCX: HEVER 2.75 x 18 mm. Distal dissection. HEVER 2.75 x 22 mm.      Diverticulitis     Diverticulosis     Familial hypercholesterolemia 2022    Former smoker 2022    Heart attack     Heart disease     History of myocardial infarction 2022    Hyperlipidemia     Hypertension     Hypertension 2022    ICD (implantable cardioverter-defibrillator) in place     Non-ST elevation myocardial infarction (NSTEMI) 2019       PAST SURGICAL HISTORY:  Past Surgical History:   Procedure Laterality Date    APPENDECTOMY N/A 2022    Procedure: APPENDECTOMY;  Surgeon: Rafa Cardozo MD;  Location: [facility];  Service: Colon and Rectal;  Laterality: N/A;    ATRIAL CARDIAC PACEMAKER INSERTION      CECECTOMY N/A 2022    Procedure: EXCISION, CECUM;  Surgeon: Rafa Cardozo MD;  [facility]: [facility];  Service: Colon and Rectal;  Laterality: N/A;     SECTION      CHOLECYSTECTOMY N/A 2022    Procedure: CHOLECYSTECTOMY;  Surgeon: Doug Epstein MD;  [facility]: [facility];  Service: General;  Laterality: N/A;    COLONOSCOPY N/A 2022    Procedure: COLONOSCOPY;  Surgeon: Rafa Cardozo MD;  [facility];  Service: Colon and Rectal;  Laterality: N/A;    COLOSTOMY  2022    Procedure: CREATION, COLOSTOMY;  Surgeon: Rafa Cardozo MD;  [facility]: [facility];  Service: Colon and Rectal;;    CORONARY STENT PLACEMENT      LEFT HEART CATHETERIZATION Left 2022    Procedure: CATHETERIZATION, HEART, LEFT;  Surgeon: Yohannes Cordova MD;  Location: [facility] CATH LAB;  Service: Cardiology;  Laterality: Left;       SOCIAL HISTORY:  Social History     Tobacco Use    Smoking status: Former     Packs/day: 0.50     Types: Cigarettes     Start date: 1976     Quit date: 2012     Years since quitting: 10.1    Smokeless tobacco: Never   Substance Use Topics    Alcohol use: No    Drug use: No     FAMILY HISTORY:  Family History   Problem Relation Age of Onset    Heart disease Mother     Emphysema Father     Heart attack  Brother        MEDICATIONS:  Home Meds:   Prior to Admission medications    Medication Sig Start Date End Date Taking? Authorizing Provider   acetaminophen (TYLENOL) 500 MG tablet Take 2 tablets (1,000 mg total) by mouth every 8 (eight) hours as needed for Pain. 1/26/22   Nghia Hunt MD   albuterol (PROVENTIL/VENTOLIN HFA) 90 mcg/actuation inhaler Inhale 2 puffs into the lungs every 6 (six) hours as needed for Wheezing. Rescue  Patient not taking: Reported on 1/25/2023 8/11/22 8/11/23  Braxton Barragan MD   ALPRAZolam (XANAX) 0.5 MG tablet Take 1 tablet (0.5 mg total) by mouth nightly as needed for Anxiety. 1/12/23   Braxton Barragan MD   aspirin (ECOTRIN) 81 MG EC tablet Take 1 tablet (81 mg total) by mouth once daily. 1/25/23   Yohannes Cordova MD   clopidogreL (PLAVIX) 75 mg tablet Take 1 tablet (75 mg total) by mouth once daily. 9/27/22   Yohannes Cordova MD   docusate sodium (COLACE) 100 MG capsule Take 1 capsule (100 mg total) by mouth 2 (two) times daily. 6/24/22   Sanjuanita Briones MD   empagliflozin (JARDIANCE) 10 mg tablet Take 1 tablet (10 mg total) by mouth once daily. 1/25/23   Yohannes Cordova MD   ezetimibe (ZETIA) 10 mg tablet Take 1 tablet (10 mg total) by mouth once daily. 1/25/23   Yohannes Cordova MD   famotidine (PEPCID) 20 MG tablet Take 1 tablet (20 mg total) by mouth once daily. 9/14/22 9/14/23  Marco Antonio Alvarado MD   fluticasone propionate (FLONASE) 50 mcg/actuation nasal spray 1 spray (50 mcg total) by Each Nostril route once daily. 8/2/21   Scout Smith MD   food supplemt, lactose-reduced (ENSURE HIGH PROTEIN) Liqd Take 240 mLs by mouth 2 (two) times a day. 8/11/22   Braxton Barragan MD   furosemide (LASIX) 40 MG tablet Take 1 tablet (40 mg total) by mouth once daily. 1/25/23   Yohannes Cordova MD   HYDROcodone-acetaminophen (NORCO) 5-325 mg per tablet Take 1 tablet by mouth every 8 (eight) hours as needed for Pain. 1/30/23   Braxton Barragan MD   losartan (COZAAR) 25 MG tablet Take 1 tablet  (25 mg total) by mouth once daily. 1/25/23   Yohannes Cordova MD   metoprolol succinate (TOPROL-XL) 25 MG 24 hr tablet Take 1 tablet (25 mg total) by mouth once daily. 1/25/23   Yohannes Cordova MD   nitroGLYCERIN (NITROSTAT) 0.4 MG SL tablet Place 1 tablet (0.4 mg total) under the tongue every 5 (five) minutes as needed for Chest pain.  Patient not taking: Reported on 1/27/2023 9/27/22   Yohannes Cordova MD   ondansetron (ZOFRAN-ODT) 8 MG TbDL Take 1 tablet (8 mg total) by mouth every 12 (twelve) hours as needed (nausea). 1/27/23   Braxton Barragan MD   simethicone (MYLICON) 80 MG chewable tablet Take 1 tablet (80 mg total) by mouth every 8 (eight) hours as needed for Flatulence. 6/30/22   Braxton Barragan MD   spironolactone (ALDACTONE) 25 MG tablet Take 0.5 tablets (12.5 mg total) by mouth once daily. 1/25/23   Yohannes Cordova MD   pantoprazole (PROTONIX) 40 MG tablet Take 1 tablet (40 mg total) by mouth once daily. 6/7/22 6/23/22  Braxton Barragan MD   prasugreL (EFFIENT) 10 mg Tab Take 1 tablet (10 mg total) by mouth once daily. 1/27/22 5/31/22  Nghia Hunt MD       Scheduled Meds:   [START ON 2/1/2023] aspirin  81 mg Oral Daily    [START ON 2/1/2023] enoxaparin  40 mg Subcutaneous Daily    [START ON 2/1/2023] ezetimibe  10 mg Oral Daily    famotidine  20 mg Oral Daily    [START ON 2/1/2023] furosemide  40 mg Oral Daily    [START ON 2/1/2023] losartan  25 mg Oral Daily    [START ON 2/1/2023] metoprolol succinate  25 mg Oral Daily    polyethylene glycol  17 g Oral BID    [START ON 2/1/2023] spironolactone  12.5 mg Oral Daily     Continuous Infusions:    PRN Meds:.acetaminophen, albuterol, ALPRAZolam, dextrose 10%, dextrose 10%, fluticasone propionate, naloxone, nitroGLYCERIN, ondansetron, oxyCODONE, prochlorperazine, sodium chloride 0.9%    ALLERGIES:  Augmentin [amoxicillin-pot clavulanate], Lisinopril, Shellfish containing products, Levaquin [levofloxacin], and Clindamycin    REVIEW OF SYSTEMS:  General: Festus  "fevers/chills/sweats, unexpected weight loss, or night sweats  Respiratory: Denies cough, shortness of breath, hemoptysis, or other respiratory problems  Cardiovascular: Denies chest pain, palpitations, or syncope. +stable LI  Gastrointestinal: Denies GI symptoms currently, aside from those detailed above  Genitourinary:  Denies dysuria, urinary incontinence, urinary frequency, hematuria, fecaluria or pneumaturia  Musculoskeletal: Denies new focal bone pain or musculoskeletal complaints  Neurologic: Denies headaches, focal numbness/weakness, or prior CVA or seizures   Hematologic/Lymphatic/Immunologic: Denies easy bruising or bleeding    PHYSICAL EXAM:  PHYSICAL EXAMINATION:  Thin/frail, well-developed, in no acute distress  Blood pressure (!) 101/55, pulse 75, temperature 98.4 °F (36.9 °C), temperature source Oral, resp. rate 16, height 5' 3" (1.6 m), weight 56.2 kg (124 lb), SpO2 (!) 93 %, not currently breastfeeding.  Body mass index is 21.97 kg/m².  HEENT: Sclerae anicteric, trachea midline  Pulm: Normal respiratory rate and effort on room air  Abd:   Soft, mild TTP around colocutaneous fistula, otherwise non-tender without rebound/guarding, non-distended. (See photos below pre/post prolapse reduction)  Ext:   Warm and well perfused. No upper or lower extremity edema bilaterally.  Neuro: Grossly intact with no focal neurological deficit.    Prolapsed colocutaneous fistula following proximal limb reduction:      Colocutaneous fistula following proximal and distal limb reduction:  Both limbs     LABORATORY RESULTS:  Complete Blood Count:  Lab Results   Component Value Date/Time    WBC 3.78 (L) 01/31/2023 04:42 PM    HGB 11.8 (L) 01/31/2023 04:42 PM    HCT 38.5 01/31/2023 04:42 PM    HCT 46 05/06/2022 09:55 PM    RBC 4.91 01/31/2023 04:42 PM     01/31/2023 04:42 PM       Basic Chemistry Panel:  Lab Results   Component Value Date/Time     (L) 01/31/2023 04:42 PM    K 4.4 01/31/2023 04:42 PM    CL 91 " (L) 01/31/2023 04:42 PM    CO2 24 01/31/2023 04:42 PM    BUN 25 (H) 01/31/2023 04:42 PM    CREATININE 0.9 01/31/2023 04:42 PM    CALCIUM 8.7 01/31/2023 04:42 PM       Hepatic Panel:  Lab Results   Component Value Date/Time    ALKPHOS 200 (H) 01/31/2023 04:42 PM    ALBUMIN 2.0 (L) 01/31/2023 04:42 PM       Nutrition Labs:  Lab Results   Component Value Date/Time    ALBUMIN 2.0 (L) 01/31/2023 04:42 PM    PREALBUMIN 10 (L) 01/12/2023 03:12 PM    TRANSFERRIN 189 (L) 08/29/2022 11:26 AM       Coagulation Panel:  Lab Results   Component Value Date/Time    INR 1.2 07/21/2022 04:28 AM       IMPRESSION:  Colocutaneous fistula prolapse s/p manual reduction at bedside    PLAN:  - Admit to CRS, attending Dr. Cardozo  - h/o CAD/MI and HFrEF, will discuss with cardiology in AM including management of hyponatremia  - Continue home HFrEF medications with 1L fluid restriction  - Continue regular diet, begin Miralax BID bowel regimen  - Continue local prolapse care with stoma bag and abdominal binder  - Serial abdominal exams in setting of mucosal ischemia prior to prolapse reduction  - Dispo: ongoing, GISSU    Patient evaluated with the attending surgeon, Dr. Cardozo.    Pablo Ramirez MD  Colon and Rectal Surgery Fellow

## 2023-02-01 NOTE — CONSULTS
[facility]  Cardiology  Consult Note    Patient Name: Odette Hart  MRN: [id number]  Admission Date: 1/31/2023  Hospital Length of Stay: 1 days  Code Status: Full Code   Attending Provider: Rafa Cardozo MD   Consulting Provider: Wesley Wilder MD  Primary Care Physician: Braxton Barragan MD  Principal Problem:Ischemic cardiomyopathy    Patient information was obtained from patient and ER records.     Inpatient consult to Cardiology  Consult performed by: Wesley Wilder MD  Consult ordered by: Pablo Ramirez MD        Subjective:     Chief Complaint:  Low sodium      HPI:   67-year-old female with past Medical history of ischemic cardiomyopathy with EF of 10%, coronary artery disease status post PCI to LAD presents for colocutaneous fistula repair with abdominal wall reconstruction surgery however shows found to have low sodium and cardiology was consulted for fluid optimization.  Patient is on oral Lasix at home and is on Toprol-XL, losartan for her GDM T.  Follows with cardiologist at [facility].  Denies having orthopnea PND but she was kept on oxygen last night.  Also complains of mild lower extremity edema.  Denies having any presyncopal or syncopal events.  She had history of complicated diverticulitis resulting in colovesical fistula which was repaired later developed colo cutaneous fistula      Past Medical History:   Diagnosis Date    Acute coronary syndrome     Acute exacerbation of chronic obstructive pulmonary disease (COPD) 3/23/2022    Allergy     Arthritis     Cardiomyopathy     CHF (congestive heart failure)     Coronary artery disease     Coronary artery disease of native artery of native heart with stable angina pectoris 4/19/2021 1/24/2022: [facility]: Cath: LAD: Proximal stent patent. LCX: Proximal 80%. LCX: Dominant. Moderate disease. LCX: HEVER 2.75 x 18 mm. Distal dissection. HEVER 2.75 x 22 mm.     Diverticulitis     Diverticulosis     Familial hypercholesterolemia  2022    Former smoker 2022    Heart attack     Heart disease     History of myocardial infarction 2022    Hyperlipidemia     Hypertension     Hypertension 2022    ICD (implantable cardioverter-defibrillator) in place     Non-ST elevation myocardial infarction (NSTEMI) 2019       Past Surgical History:   Procedure Laterality Date    APPENDECTOMY N/A 2022    Procedure: APPENDECTOMY;  Surgeon: Rafa Cardozo MD;  [facility] FLR;  Service: Colon and Rectal;  Laterality: N/A;    ATRIAL CARDIAC PACEMAKER INSERTION      CECECTOMY N/A 2022    Procedure: EXCISION, CECUM;  Surgeon: Rafa Cardozo MD;  Location: [facility] OR [facility] FLR;  Service: Colon and Rectal;  Laterality: N/A;     SECTION      CHOLECYSTECTOMY N/A 2022    Procedure: CHOLECYSTECTOMY;  Surgeon: Doug Epstein MD;  Location: [facility] OR [facility] FLR;  Service: General;  Laterality: N/A;    COLONOSCOPY N/A 2022    Procedure: COLONOSCOPY;  Surgeon: Rafa Cardozo MD;  Location: [facility] OR 2ND FLR;  Service: Colon and Rectal;  Laterality: N/A;    COLOSTOMY  2022    Procedure: CREATION, COLOSTOMY;  Surgeon: Rafa Cardozo MD;  [facility];  Service: Colon and Rectal;;    CORONARY STENT PLACEMENT      LEFT HEART CATHETERIZATION Left 2022    Procedure: CATHETERIZATION, HEART, LEFT;  Surgeon: Yohannes Cordova MD;  Location: [facility] CATH LAB;  Service: Cardiology;  Laterality: Left;       Review of patient's allergies indicates:   Allergen Reactions    Augmentin [amoxicillin-pot clavulanate] Swelling     Not allergic to amoxicillin just a derivative of augmentin    Lisinopril Other (See Comments)     Fluid around heart    Shellfish containing products Anaphylaxis     Pt.states she is allergic to SEAFOOD since the age of 13    Levaquin [levofloxacin] Other (See Comments)     Very bad joint pain    Clindamycin Palpitations     Chest pain       No current  facility-administered medications on file prior to encounter.     Current Outpatient Medications on File Prior to Encounter   Medication Sig    acetaminophen (TYLENOL) 500 MG tablet Take 2 tablets (1,000 mg total) by mouth every 8 (eight) hours as needed for Pain.    albuterol (PROVENTIL/VENTOLIN HFA) 90 mcg/actuation inhaler Inhale 2 puffs into the lungs every 6 (six) hours as needed for Wheezing. Rescue (Patient not taking: Reported on 1/25/2023)    ALPRAZolam (XANAX) 0.5 MG tablet Take 1 tablet (0.5 mg total) by mouth nightly as needed for Anxiety.    aspirin (ECOTRIN) 81 MG EC tablet Take 1 tablet (81 mg total) by mouth once daily.    clopidogreL (PLAVIX) 75 mg tablet Take 1 tablet (75 mg total) by mouth once daily.    docusate sodium (COLACE) 100 MG capsule Take 1 capsule (100 mg total) by mouth 2 (two) times daily.    empagliflozin (JARDIANCE) 10 mg tablet Take 1 tablet (10 mg total) by mouth once daily.    ezetimibe (ZETIA) 10 mg tablet Take 1 tablet (10 mg total) by mouth once daily.    famotidine (PEPCID) 20 MG tablet Take 1 tablet (20 mg total) by mouth once daily.    fluticasone propionate (FLONASE) 50 mcg/actuation nasal spray 1 spray (50 mcg total) by Each Nostril route once daily.    food supplemt, lactose-reduced (ENSURE HIGH PROTEIN) Liqd Take 240 mLs by mouth 2 (two) times a day.    furosemide (LASIX) 40 MG tablet Take 1 tablet (40 mg total) by mouth once daily.    HYDROcodone-acetaminophen (NORCO) 5-325 mg per tablet Take 1 tablet by mouth every 8 (eight) hours as needed for Pain.    losartan (COZAAR) 25 MG tablet Take 1 tablet (25 mg total) by mouth once daily.    metoprolol succinate (TOPROL-XL) 25 MG 24 hr tablet Take 1 tablet (25 mg total) by mouth once daily.    nitroGLYCERIN (NITROSTAT) 0.4 MG SL tablet Place 1 tablet (0.4 mg total) under the tongue every 5 (five) minutes as needed for Chest pain. (Patient not taking: Reported on 1/27/2023)    ondansetron (ZOFRAN-ODT) 8 MG  TbDL Take 1 tablet (8 mg total) by mouth every 12 (twelve) hours as needed (nausea).    simethicone (MYLICON) 80 MG chewable tablet Take 1 tablet (80 mg total) by mouth every 8 (eight) hours as needed for Flatulence.    spironolactone (ALDACTONE) 25 MG tablet Take 0.5 tablets (12.5 mg total) by mouth once daily.    [DISCONTINUED] pantoprazole (PROTONIX) 40 MG tablet Take 1 tablet (40 mg total) by mouth once daily.    [DISCONTINUED] prasugreL (EFFIENT) 10 mg Tab Take 1 tablet (10 mg total) by mouth once daily.     Family History       Problem Relation (Age of Onset)    Emphysema Father    Heart attack Brother    Heart disease Mother          Tobacco Use    Smoking status: Former     Packs/day: 0.50     Types: Cigarettes     Start date: 2/4/1976     Quit date: 12/4/2012     Years since quitting: 10.1    Smokeless tobacco: Never   Substance and Sexual Activity    Alcohol use: No    Drug use: No    Sexual activity: Not on file     Review of Systems   Constitutional: Negative.   HENT: Negative.     Eyes: Negative.    Cardiovascular: Negative.    Respiratory: Negative.     Endocrine: Negative.    Hematologic/Lymphatic: Negative.    Skin: Negative.    Musculoskeletal: Negative.    Gastrointestinal: Negative.    Genitourinary: Negative.    Neurological: Negative.    Objective:     Vital Signs (Most Recent):  Temp: 98.4 °F (36.9 °C) (02/01/23 0736)  Pulse: 67 (02/01/23 0736)  Resp: 18 (02/01/23 0736)  BP: (!) 93/52 (02/01/23 0736)  SpO2: 95 % (02/01/23 0736)   Vital Signs (24h Range):  Temp:  [97.1 °F (36.2 °C)-98.4 °F (36.9 °C)] 98.4 °F (36.9 °C)  Pulse:  [67-79] 67  Resp:  [16-20] 18  SpO2:  [92 %-99 %] 95 %  BP: ()/(52-62) 93/52     Weight: 56.2 kg (124 lb)  Body mass index is 21.97 kg/m².    SpO2: 95 %         Intake/Output Summary (Last 24 hours) at 2/1/2023 1002  Last data filed at 2/1/2023 0982  Gross per 24 hour   Intake --   Output 150 ml   Net -150 ml       Lines/Drains/Airways       Drain  Duration                   Colostomy 05/09/22 1100 Descending/sigmoid  days              Peripheral Intravenous Line  Duration                  Peripheral IV - Single Lumen 01/31/23 1530 20 G Left;Posterior Forearm <1 day                    Physical Exam  Constitutional:       Appearance: Normal appearance.   HENT:      Head: Normocephalic and atraumatic.   Eyes:      Pupils: Pupils are equal, round, and reactive to light.   Neck:      Comments: Elevated JVP up to 16 cm of water  Cardiovascular:      Rate and Rhythm: Normal rate and regular rhythm.      Comments: S3 present   Pulmonary:      Effort: Pulmonary effort is normal.      Breath sounds: Normal breath sounds.   Abdominal:      General: Abdomen is flat. Bowel sounds are normal.      Palpations: Abdomen is soft.   Musculoskeletal:         General: Normal range of motion.      Cervical back: Normal range of motion.   Skin:     General: Skin is warm.      Capillary Refill: Capillary refill takes less than 2 seconds.   Neurological:      General: No focal deficit present.      Mental Status: She is alert and oriented to person, place, and time.       Significant Labs: All pertinent lab results from the last 24 hours have been reviewed.         Assessment and Plan:     * Ischemic cardiomyopathy  Acute on chronic combined systolic and diastolic heart failure  Recent echo showed EF of 10%  Patient appears volume overloaded on examination  Currently on Toprol-XL 25, losartan 25 mg.    Plan  Recommend to start patient on IV Lasix 80 mg b.i.d..  Dropped the Toprol-XL to 12.5 mg daily  Will order BNP and  CXR     Hyponatremia  Patient has hypervolemic hyponatremia from volume overload  Recommend to monitor sodium with diuresis. check sodium every 8 hours.    Coronary artery disease  History of anterior wall MI with LAD stent in 2012 and circumflex stent in January 2022.  Continue aspirin   Lipitor,  Ezetimibe  Plavix on hold for surgery          VTE Risk Mitigation (From  admission, onward)         Ordered     enoxaparin injection 40 mg  Daily         01/31/23 1202     IP VTE HIGH RISK PATIENT  Once         01/31/23 1202     Place sequential compression device  Until discontinued         01/31/23 1202                Thank you for your consult. I will follow-up with patient. Please contact us if you have any additional questions.    Wesley Wilder MD  Cardiology   [facility]

## 2023-02-01 NOTE — PT/OT/SLP EVAL
Physical Therapy Evaluation    Patient Name:  Odette Hart   MRN:  [id number]    Recommendations:     Discharge Recommendations: home health PT   Discharge Equipment Recommendations: none   Barriers to discharge: Decreased caregiver support    Assessment:     Odette Hart is a 67 y.o. female admitted with a medical diagnosis of Ischemic cardiomyopathy.  She presents with the following impairments/functional limitations: impaired endurance, impaired functional mobility, gait instability, impaired balance. Pt was receptive to physical therapy evaluation. Activity limited due to deconditioning. Pt able to amb 100 feet with SBA without AD. Due to current limitations listed above pt is functioning below her baseline and would benefit from acute skilled physical therapy to improve functional mobility for return home with home health PT if needed.    Rehab Prognosis: Good; patient would benefit from acute skilled PT services to address these deficits and reach maximum level of function.    Recent Surgery: * No surgery found *      Plan:     During this hospitalization, patient to be seen 3 x/week to address the identified rehab impairments via gait training, therapeutic activities, therapeutic exercises and progress toward the following goals:    Plan of Care Expires:  03/03/23    Subjective     Chief Complaint: abdominal soreness  Patient/Family Comments/goals: feeling better since this morning  Pain/Comfort:  Pain Rating 1: other (see comments) (pt did not rate)  Location - Orientation 1: generalized  Location 1: abdomen  Pain Addressed 1: Distraction  Pain Rating Post-Intervention 1: 0/10    Patients cultural, spiritual, Confucianist conflicts given the current situation: no    Living Environment:  Pt lives alone in mobile home with 4 SKYLER with R HR.  Prior to admission, patients level of function was independent.  Equipment used at home: walker, rolling, rollator, bedside commode.  Upon discharge, patient will  have assistance from son.    Objective:     Communicated with nurse prior to session.  Patient found right sidelying with Other (comments) (no active lines)  upon PT entry to room.    General Precautions: Standard, fall  Orthopedic Precautions:N/A   Braces: N/A  Respiratory Status: Room air    Exams:  Sensation:    -       Impaired  light/touch numbness of B feet  RLE ROM: WFL  RLE Strength: WFL except Hip flex - 3/5  LLE ROM: WFL  LLE Strength: WFL    Functional Mobility:  Bed Mobility:     Scooting: supervision  Supine to Sit: supervision  Transfers:     Sit to Stand:  supervision with no AD  Gait: 100' with SBA with no AD. Pt amb with mild generalized unsteadiness with decreased jan and without LOB. Pt used hand rail with R hand on wall for stability. Pt reported mild SOB and took 1 standing rest break during gait trial.  Balance: Static/dynamic sitting balance: good  Dynamic standing balance: fair +, pt able to amb without AD but used wall hand rail for stability.      AM-PAC 6 CLICK MOBILITY  Total Score:23       Treatment & Education:  Discussed POC with pt who verbalized understanding. Educated pt on PT role in rehabilitation. Educated pt on safety with mobility and continued amb during hospital stay.    Patient left sitting edge of bed with all lines intact and call button in reach.    GOALS:   Multidisciplinary Problems       Physical Therapy Goals          Problem: Physical Therapy    Goal Priority Disciplines Outcome Goal Variances Interventions   Physical Therapy Goal     PT, PT/OT Ongoing, Progressing     Description: Goals to be met by: 2/15/2023     Patient will increase functional independence with mobility by performin. Gait  x 250 feet with Supervision using No Assistive Device.   2. Ascend/descend 4 stair with right Handrails Supervision using No Assistive Device.                          History:     Past Medical History:   Diagnosis Date    Acute coronary syndrome     Acute  exacerbation of chronic obstructive pulmonary disease (COPD) 3/23/2022    Allergy     Arthritis     Cardiomyopathy     CHF (congestive heart failure)     Coronary artery disease     Coronary artery disease of native artery of native heart with stable angina pectoris 2021: [facility]: Cath: LAD: Proximal stent patent. LCX: Proximal 80%. LCX: Dominant. Moderate disease. LCX: HEVER 2.75 x 18 mm. Distal dissection. HEVER 2.75 x 22 mm.     Diverticulitis     Diverticulosis     Familial hypercholesterolemia 2022    Former smoker 2022    Heart attack     Heart disease     History of myocardial infarction 2022    Hyperlipidemia     Hypertension     Hypertension 2022    ICD (implantable cardioverter-defibrillator) in place     Non-ST elevation myocardial infarction (NSTEMI) 2019       Past Surgical History:   Procedure Laterality Date    APPENDECTOMY N/A 2022    Procedure: APPENDECTOMY;  Surgeon: Rafa Cardozo MD;  Location: [facility] OR 2ND FLR;  Service: Colon and Rectal;  Laterality: N/A;    ATRIAL CARDIAC PACEMAKER INSERTION      CECECTOMY N/A 2022    Procedure: EXCISION, CECUM;  Surgeon: Rafa Cardozo MD;  Location: [facility] OR 2ND FLR;  Service: Colon and Rectal;  Laterality: N/A;     SECTION      CHOLECYSTECTOMY N/A 2022    Procedure: CHOLECYSTECTOMY;  Surgeon: Doug Epstein MD;  Location: [facility] OR 2ND FLR;  Service: General;  Laterality: N/A;    COLONOSCOPY N/A 2022    Procedure: COLONOSCOPY;  Surgeon: Rafa Cardozo MD;  Location: [facility] OR 2ND FLR;  Service: Colon and Rectal;  Laterality: N/A;    COLOSTOMY  2022    Procedure: CREATION, COLOSTOMY;  Surgeon: Rafa Cardozo MD;  Location: [facility] OR 2ND FLR;  Service: Colon and Rectal;;    CORONARY STENT PLACEMENT      LEFT HEART CATHETERIZATION Left 2022    Procedure: CATHETERIZATION, HEART, LEFT;  Surgeon: Yohannes Cordova MD;  Location: [facility] CATH LAB;  Service: Cardiology;  Laterality:  Left;       Time Tracking:     PT Received On: 02/01/23  PT Start Time: 1403     PT Stop Time: 1417  PT Total Time (min): 14 min     Billable Minutes: Evaluation 14      02/01/2023

## 2023-02-01 NOTE — ASSESSMENT & PLAN NOTE
Patient has hypervolemic hyponatremia from volume overload  Recommend to monitor sodium with diuresis. check sodium every 8 hours.

## 2023-02-01 NOTE — PLAN OF CARE
Sw attempted to assess pt multiple times, pt unavailable due to being in the restroom or in bed side procedures.  Left vm for family. SW to attempt assessment again at a later date.     Marbella López LCSW  Case Management/[facility]  [phone]

## 2023-02-01 NOTE — PLAN OF CARE
Pt a&o4, all safety precautions in place, pts BP runs low and MD's are aware (said it was okay to give 80 mg lasix dose today however pt refused full dose and only took 40 mg), pt is asymptomatic with lower BP's in 90's, pt understands ostomy cares and drains bag herself, pt had a new IV placed today, no other concerns at this time. Awaiting surgery Monday.  Problem: Adult Inpatient Plan of Care  Goal: Plan of Care Review  Outcome: Ongoing, Progressing  Goal: Patient-Specific Goal (Individualized)  Outcome: Ongoing, Progressing  Goal: Absence of Hospital-Acquired Illness or Injury  Outcome: Ongoing, Progressing  Goal: Optimal Comfort and Wellbeing  Outcome: Ongoing, Progressing  Goal: Readiness for Transition of Care  Outcome: Ongoing, Progressing     Problem: Fall Injury Risk  Goal: Absence of Fall and Fall-Related Injury  Outcome: Ongoing, Progressing

## 2023-02-01 NOTE — ASSESSMENT & PLAN NOTE
Acute on chronic combined systolic and diastolic heart failure  Recent echo showed EF of 10%  Patient appears volume overloaded on examination  Currently on Toprol-XL 25, losartan 25 mg.    Plan  Recommend to start patient on IV Lasix 80 mg b.i.d..  Dropped the Toprol-XL to 12.5 mg daily  Will order BNP and  CXR

## 2023-02-01 NOTE — HPI
67-year-old female with past Medical history of ischemic cardiomyopathy with EF of 10%, coronary artery disease status post PCI to LAD presents for colocutaneous fistula repair with abdominal wall reconstruction surgery however shows found to have low sodium and cardiology was consulted for fluid optimization.  Patient is on oral Lasix at home and is on Toprol-XL, losartan for her GDM T.  Follows with cardiologist at [facility].  Denies having orthopnea PND but she was kept on oxygen last night.  Also complains of mild lower extremity edema.  Denies having any presyncopal or syncopal events.  She had history of complicated diverticulitis resulting in colovesical fistula which was repaired later developed colo cutaneous fistula

## 2023-02-01 NOTE — PLAN OF CARE
Problem: Physical Therapy  Goal: Physical Therapy Goal  Description: Goals to be met by: 2/15/2023     Patient will increase functional independence with mobility by performin. Gait  x 250 feet with Supervision using No Assistive Device.   2. Ascend/descend 4 stair with right Handrails Supervision using No Assistive Device.     Outcome: Ongoing, Progressing

## 2023-02-02 NOTE — PLAN OF CARE
Pt a&o 4, all safety precautions in place. All wound cares and ostomy cares were completed. Bag was drained by the pt. All VS were stable throughout day, pt's bp runs on the lower side but has been asymptomatic. Pt ambulated independently. Pain manageable with medication provided. No other concerns at this time. Pt waiting for surgery Monday.   Problem: Adult Inpatient Plan of Care  Goal: Plan of Care Review  Outcome: Ongoing, Progressing  Goal: Patient-Specific Goal (Individualized)  Outcome: Ongoing, Progressing  Goal: Absence of Hospital-Acquired Illness or Injury  Outcome: Ongoing, Progressing  Goal: Optimal Comfort and Wellbeing  Outcome: Ongoing, Progressing  Goal: Readiness for Transition of Care  Outcome: Ongoing, Progressing     Problem: Fall Injury Risk  Goal: Absence of Fall and Fall-Related Injury  Outcome: Ongoing, Progressing

## 2023-02-02 NOTE — ASSESSMENT & PLAN NOTE
Patient has hypervolemic hyponatremia from volume overload. Improving with diuresis.  Recommend to monitor sodium with diuresis. check sodium every 8 hours.

## 2023-02-02 NOTE — PLAN OF CARE
POC reviewed with patient, verbalized understanding. AAOx4, VSS on RA ex. BP soft, MD aware.     -ABD binder in place  -Regular diet, tolerating well  -Up to toilet independently, Adequate UOP, independent with ostomy care  -No c/o nausea this shift  Bed in low and locked position, call light in reach.   Problem: Adult Inpatient Plan of Care  Goal: Plan of Care Review  Outcome: Ongoing, Progressing  Goal: Patient-Specific Goal (Individualized)  Outcome: Ongoing, Progressing  Goal: Absence of Hospital-Acquired Illness or Injury  Outcome: Ongoing, Progressing  Goal: Optimal Comfort and Wellbeing  Outcome: Ongoing, Progressing  Goal: Readiness for Transition of Care  Outcome: Ongoing, Progressing     Problem: Fall Injury Risk  Goal: Absence of Fall and Fall-Related Injury  Outcome: Ongoing, Progressing

## 2023-02-02 NOTE — ASSESSMENT & PLAN NOTE
Acute on chronic combined systolic and diastolic heart failure  Recent echo showed EF of 10%  Patient appears volume overloaded on examination  Currently on Toprol-XL 25, losartan 25 mg.  CXR without new detrimental changes since prior, BNP 2600     Plan  - Recommend transition IV lasix today, transition to oral 80mg bid tomorrow.   - Replete Mag>2, K>4  - Continue Toprol-XL 12.5    Cardiology will sign off. Please contact for any further questions.

## 2023-02-02 NOTE — PLAN OF CARE
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]    Admission Date: 1/31/2023  Expected Discharge Date: 2/7/2023    Discharge Barriers Identified: None    Payor: [employer]ICARE / Plan: [employer] CHOICES 65 / Product Type: Medicare Advantage /     Extended Emergency Contact Information  Primary Emergency Contact: Telly Ortiz Jr   [place]  Mobile Phone: [phone]  Relation: Son  Secondary Emergency Contact: Abelardo Ortiz  Mobile Phone: [phone]  Relation: Son    Discharge Plan A: Home Health         [facility] [address] [facility] [place] [facility] [address]  [address]  [place] [address]  Phone: [phone] Fax: [phone]    [facility]  [address]  [place] [address]  Phone: [phone] Fax: [phone]      Initial Assessment (most recent)       Adult Discharge Assessment - 02/02/23 1150          Discharge Assessment    Assessment Type Discharge Planning Brief Assessment     Confirmed/corrected address, phone number and insurance Yes     Confirmed Demographics Correct on Facesheet     Source of Information patient     When was your last doctors appointment? 01/25/23     Does patient/caregiver understand observation status Yes     Communicated SIVAN with patient/caregiver Yes     Reason For Admission Fistula of intestine     People in Home alone     Facility Arrived From: Home     Do you expect to return to your current living situation? Yes     Do you have help at home or someone to help you manage your care at home? Yes     Who are your caregiver(s) and their phone number(s)? Telly-[phone]     Prior to hospitilization cognitive status: Alert/Oriented     Current cognitive status: Alert/Oriented     Equipment Currently Used at Home none      Readmission within 30 days? No     Patient currently being followed by outpatient case management? No     Do you currently have service(s) that help you manage your care at home? Yes     How Many hours does patient receive services 4     Name and Contact number of agency [facility]     Is the pt/caregiver preference to resume services with current agency Yes     Do you take prescription medications? Yes     Do you have prescription coverage? Yes     Coverage PHN     Do you have any problems affording any of your prescribed medications? No     Is the patient taking medications as prescribed? yes     Who is going to help you get home at discharge? [phone]     How do you get to doctors appointments? family or friend will provide     Are you on dialysis? No     Do you take coumadin? No     Discharge Plan A Home Health     Discharge Plan discussed with: Patient     Discharge Barriers Identified None                          Spoke to pt. Pt lives at home with self. Post hospital  stay [phone] will be pt support person and pt. has transportation at d/c with son. There have been no hospitalizations within the last 30 days per pt. Verified pt PCP and preferred pharmacy. Pt stated not on Coumadin and is not receiving dialysis. All questions answered regarding case management/ discharge planning , pt verbalized understanding. Discharge booklet with SW contact information given to pt.       Marbella López LCSW  Case Management/[facility]  [phone]

## 2023-02-02 NOTE — PROGRESS NOTES
Plastic Surgery Progress note    Eating small amount of her breakfast, trying to drink boost shake  Denies obstructive symptoms    Vitals:    02/02/23 1128   BP: 110/60   Pulse: 67   Resp: 18   Temp: 98.9 °F (37.2 °C)     Temp:  [96.4 °F (35.8 °C)-99 °F (37.2 °C)]     NAD, Aox3  RRR, breathing nonlabored  Fistula reduced with ostomy pouch and abd binder in place    Albumin 2 (prealbumin 10 on 1/12)    Awaiting fistula takedown and complex abdominal wall reconstruction Monday  Nutrition consult placed for her malnutrition  Cardiology following - high risk    Chilo Sanchez, DO  Plastic and Reconstructive Surgery

## 2023-02-02 NOTE — PROGRESS NOTES
[facility]  Cardiology  Progress Note    Patient Name: Odette Hart  MRN: [id number]  Admission Date: 1/31/2023  Hospital Length of Stay: 2 days  Code Status: Full Code   Attending Physician: Rafa Cardozo MD   Primary Care Physician: Braxton Barragan MD  Expected Discharge Date: 2/7/2023  Principal Problem:Ischemic cardiomyopathy    Subjective:     Hospital Course:   No notes on file    Interval History: VSS, AF. Patient urinating frequently yesterday after IV lasix given, no I/O recorded int he chart. Her sodium is improving. She denies any palpitations or chest pain. Able to lie flat.     Review of Systems   Constitutional: Negative for chills and fever.   HENT:  Negative for congestion and sore throat.    Eyes:  Negative for blurred vision and double vision.   Cardiovascular:  Negative for chest pain and palpitations.   Respiratory:  Negative for cough and sputum production.    Endocrine: Negative for polydipsia and polyphagia.   Musculoskeletal:  Negative for back pain and myalgias.   Gastrointestinal:  Negative for bloating, diarrhea, nausea and vomiting.   Genitourinary:  Negative for flank pain, frequency and hematuria.   Neurological:  Negative for numbness and weakness.   Psychiatric/Behavioral:  Negative for altered mental status. The patient is not nervous/anxious.    Objective:     Vital Signs (Most Recent):  Temp: 98.9 °F (37.2 °C) (02/02/23 1128)  Pulse: 67 (02/02/23 1128)  Resp: 18 (02/02/23 1128)  BP: 110/60 (02/02/23 1128)  SpO2: 98 % (02/02/23 1128) Vital Signs (24h Range):  Temp:  [96.4 °F (35.8 °C)-99 °F (37.2 °C)] 98.9 °F (37.2 °C)  Pulse:  [59-83] 67  Resp:  [16-20] 18  SpO2:  [93 %-98 %] 98 %  BP: ()/(51-60) 110/60     Weight: 56.2 kg (124 lb)  Body mass index is 21.97 kg/m².     SpO2: 98 %         Intake/Output Summary (Last 24 hours) at 2/2/2023 1250  Last data filed at 2/1/2023 1758  Gross per 24 hour   Intake --   Output 300 ml   Net -300 ml       Lines/Drains/Airways        Drain  Duration                  Colostomy 05/09/22 1100 Descending/sigmoid  days              Peripheral Intravenous Line  Duration                  Peripheral IV - Single Lumen 02/01/23 1530 20 G;1 3/4 in Left Forearm <1 day                    Physical Exam  HENT:      Head: Normocephalic.      Mouth/Throat:      Mouth: Mucous membranes are moist.   Eyes:      Pupils: Pupils are equal, round, and reactive to light.   Cardiovascular:      Rate and Rhythm: Normal rate.      Comments: JVD around clavicle  Pulmonary:      Effort: Pulmonary effort is normal.   Abdominal:      Comments: Fistula present   Musculoskeletal:         General: Normal range of motion.      Cervical back: Normal range of motion.   Skin:     General: Skin is warm.      Capillary Refill: Capillary refill takes less than 2 seconds.   Neurological:      General: No focal deficit present.      Mental Status: She is alert and oriented to person, place, and time.   Psychiatric:         Mood and Affect: Mood normal.       Significant Labs: All pertinent lab results from the last 24 hours have been reviewed.    Significant Imaging: No new imaging    Assessment and Plan:     Brief HPI: 68 y/o with multiple prior abdominal surgeries here for fistula repair. Found to have sodium of 122, fluid overloaded. Currently diuresing with improvement in sodium. Hopefully can go to OR next week.     * Ischemic cardiomyopathy  Acute on chronic combined systolic and diastolic heart failure  Recent echo showed EF of 10%  Patient appears volume overloaded on examination  Currently on Toprol-XL 25, losartan 25 mg.  CXR without new detrimental changes since prior, BNP 2600     Plan  - Recommend transition IV lasix today, transition to oral 80mg bid tomorrow.   - Replete Mag>2, K>4  - Continue Toprol-XL 12.5    Cardiology will sign off. Please contact for any further questions.     Hyponatremia  Patient has hypervolemic hyponatremia from volume overload.  Improving with diuresis.  Recommend to monitor sodium with diuresis. check sodium every 8 hours.    Coronary artery disease  History of anterior wall MI with LAD stent in 2012 and circumflex stent in January 2022.  Continue ASA, Lipitor,  Ezetimibe  Plavix on hold for surgery          VTE Risk Mitigation (From admission, onward)         Ordered     enoxaparin injection 40 mg  Daily         01/31/23 1202     IP VTE HIGH RISK PATIENT  Once         01/31/23 1202     Place sequential compression device  Until discontinued         01/31/23 1202                Regi Tapia,   Cardiology  [facility]

## 2023-02-02 NOTE — PROGRESS NOTES
Patient Name: Odette Hart  Date: 2/1/2023  Service: Colon and Rectal Surgery    Subjective:  No acute events overnight. No recurrent prolapse of colocutaneous fistula with abdominal binder in place. Pain well controlled with intermittent oral analgesia. Tolerated regular diet without nausea/vomiting. +Stool and flatus in bag. Voiding freely without symptoms of retention. Denies fevers/chills/sweats, lightheadedness/dizziness, chest pain/shortness of breath, upper or lower extremity edema/pain.     Medications:    Current Facility-Administered Medications:     acetaminophen tablet 1,000 mg, 1,000 mg, Oral, Q8H PRN, Pablo Ramirez MD, 1,000 mg at 02/01/23 1124    albuterol inhaler 2 puff, 2 puff, Inhalation, Q6H PRN, Pablo Ramirez MD    ALPRAZolam tablet 0.5 mg, 0.5 mg, Oral, Nightly PRN, Pablo Ramirez MD    aspirin EC tablet 81 mg, 81 mg, Oral, Daily, Pablo Ramirez MD, 81 mg at 02/01/23 0818    dextrose 10% bolus 125 mL 125 mL, 12.5 g, Intravenous, PRN, Pablo Ramirez MD    dextrose 10% bolus 250 mL 250 mL, 25 g, Intravenous, PRN, Pablo Ramirez MD    enoxaparin injection 40 mg, 40 mg, Subcutaneous, Daily, Pablo Ramirez MD    ezetimibe tablet 10 mg, 10 mg, Oral, Daily, Pablo Ramirez MD, 10 mg at 02/01/23 0818    famotidine tablet 20 mg, 20 mg, Oral, Daily, Pablo Ramirez MD, 20 mg at 02/01/23 0818    fluticasone propionate 50 mcg/actuation nasal spray 50 mcg, 1 spray, Each Nostril, Daily PRN, Pablo Ramirez MD    furosemide injection 80 mg, 80 mg, Intravenous, Q12H, Wesley Wilder MD, 80 mg at 02/01/23 1113    losartan tablet 25 mg, 25 mg, Oral, Daily, Pablo Ramirez MD    metoprolol succinate (TOPROL-XL) 24 hr tablet 25 mg, 25 mg, Oral, Daily, Pablo Ramirez MD, 25 mg at 02/01/23 0900    naloxone 0.4 mg/mL injection 0.02 mg, 0.02 mg, Intravenous, PRN, aPblo Ramirez MD    nitroGLYCERIN SL tablet 0.4 mg, 0.4 mg, Sublingual, Q5 Min PRN, Pablo Ramirez MD     ondansetron injection 4 mg, 4 mg, Intravenous, Q6H PRN, Pablo Ramirez MD, 4 mg at 01/31/23 1602    oxyCODONE immediate release tablet 5 mg, 5 mg, Oral, Q4H PRN, Pablo Ramirez MD, 5 mg at 02/01/23 0559    polyethylene glycol packet 17 g, 17 g, Oral, BID, Pablo Ramirez MD, 17 g at 02/01/23 0818    prochlorperazine injection Soln 5 mg, 5 mg, Intravenous, Q6H PRN, Pablo Ramirez MD    sodium chloride 0.9% flush 10 mL, 10 mL, Intravenous, PRN, Pablo Ramirez MD    Vital Signs:  Vitals:    02/01/23 1929   BP: (!) 87/52   Pulse: (!) 59   Resp: 16   Temp: 97.9 °F (36.6 °C)       In/Out:  Intake/Output - Last 3 Shifts         01/30 0700 01/31 0659 01/31 0700 02/01 0659 02/01 0700  02/02 0659    Stool   450    Total Output   450    Net   -450           Stool Occurrence  0 x             Physical Exam:  General: Alert, oriented, in no apparent distress  HEENT: Sclera anicteric, trachea midline  Lungs: Normal respiratory rate and effort on room air  Abdomen: Soft, nondistended. Midline colocutaneous fistula without recurrent prolapse, bowel pink/well perfused with flatus/stool in ostomy bag; LUQ end colostomy pink/well perfused without output.  Extremities: Warm, well perfused, 1+ BL LE edema, SCDs in place  Neuro: Grossly intact, moves all extremities  Psych: Appropriate affect    Laboratory Studies:  Complete Blood Count:  Lab Results   Component Value Date/Time    WBC 2.32 (L) 02/01/2023 06:06 AM    HGB 11.1 (L) 02/01/2023 06:06 AM    HCT 36.3 (L) 02/01/2023 06:06 AM    HCT 46 05/06/2022 09:55 PM    RBC 4.65 02/01/2023 06:06 AM     02/01/2023 06:06 AM       Basic Chemistry Panel:  Lab Results   Component Value Date/Time     (L) 02/01/2023 01:59 PM    K 4.7 02/01/2023 06:06 AM    CL 91 (L) 02/01/2023 06:06 AM    CO2 20 (L) 02/01/2023 06:06 AM    BUN 24 (H) 02/01/2023 06:06 AM    CREATININE 1.0 02/01/2023 06:06 AM    CALCIUM 8.3 (L) 02/01/2023 06:06 AM       Hepatic Panel:  Lab Results    Component Value Date/Time    AST 51 (H) 01/31/2023 04:42 PM    ALT 46 (H) 01/31/2023 04:42 PM    ALKPHOS 200 (H) 01/31/2023 04:42 PM    BILITOT 1.4 (H) 01/31/2023 04:42 PM    ALBUMIN 2.0 (L) 01/31/2023 04:42 PM       Coagulation Panel:  Lab Results   Component Value Date/Time    INR 1.2 07/21/2022 04:28 AM     Imaging Studies:  XR Chest (2/1/23):  Continued demonstration of cardiomegaly and mild accentuation of pulmonary interstitial markings in both lower lung zones, but allowing for a slightly poorer inspiratory depth level on the current examination there has been no significant detrimental interval change in the appearance of the chest since 12/08/2022.    Assessment:  Odette Hart is a 67 y.o. year old female with history of HTN, CAD s/p PCI c/b HFrEF (EF 10%), MDD, debility, severe malnutrition, and open cholecystectomy and takedown of colovesical and colovaginal fistuli (two separate areas) with sigmoid colectomy, partial cecectomy and appendectomy, drainage of intra-abdominal abscess and end colostomy 5/9/2022 recently admitted 12/22/22 - 12/30/22 for new colocutaneous fistula who presents with incarcerated colonic prolapse from her fistula s/p manual reduction in clinic without recurrence.    Plan:  - Tylenol/Tramadol/Oxy prn pain control  - Regular diet, Miralax BID for known chronic constipation  - Appreciate cardiology recs for preoperative optimization and hyponatremia in setting of fluid overload/elevated BNP  - Asymptomatic hyponatremia (likely hypervolemic), cont serial Na with diuresis  - Cont home meds, hold Plavix in preoperative setting  - Dispo: ongoing, [facility]    Patient discussed with attending, Dr. Cardozo.    Pablo Ramirez MD  Colon and Rectal Surgery Fellow

## 2023-02-02 NOTE — ASSESSMENT & PLAN NOTE
History of anterior wall MI with LAD stent in 2012 and circumflex stent in January 2022.  Continue ASA, Lipitor,  Ezetimibe  Plavix on hold for surgery

## 2023-02-02 NOTE — SUBJECTIVE & OBJECTIVE
Interval History: VSS, AF. Patient urinating frequently yesterday after IV lasix given, no I/O recorded int he chart. Her sodium is improving. She denies any palpitations or chest pain. Able to lie flat.     Review of Systems   Constitutional: Negative for chills and fever.   HENT:  Negative for congestion and sore throat.    Eyes:  Negative for blurred vision and double vision.   Cardiovascular:  Negative for chest pain and palpitations.   Respiratory:  Negative for cough and sputum production.    Endocrine: Negative for polydipsia and polyphagia.   Musculoskeletal:  Negative for back pain and myalgias.   Gastrointestinal:  Negative for bloating, diarrhea, nausea and vomiting.   Genitourinary:  Negative for flank pain, frequency and hematuria.   Neurological:  Negative for numbness and weakness.   Psychiatric/Behavioral:  Negative for altered mental status. The patient is not nervous/anxious.    Objective:     Vital Signs (Most Recent):  Temp: 98.9 °F (37.2 °C) (02/02/23 1128)  Pulse: 67 (02/02/23 1128)  Resp: 18 (02/02/23 1128)  BP: 110/60 (02/02/23 1128)  SpO2: 98 % (02/02/23 1128) Vital Signs (24h Range):  Temp:  [96.4 °F (35.8 °C)-99 °F (37.2 °C)] 98.9 °F (37.2 °C)  Pulse:  [59-83] 67  Resp:  [16-20] 18  SpO2:  [93 %-98 %] 98 %  BP: ()/(51-60) 110/60     Weight: 56.2 kg (124 lb)  Body mass index is 21.97 kg/m².     SpO2: 98 %         Intake/Output Summary (Last 24 hours) at 2/2/2023 1250  Last data filed at 2/1/2023 1758  Gross per 24 hour   Intake --   Output 300 ml   Net -300 ml       Lines/Drains/Airways       Drain  Duration                  Colostomy 05/09/22 1100 Descending/sigmoid  days              Peripheral Intravenous Line  Duration                  Peripheral IV - Single Lumen 02/01/23 1530 20 G;1 3/4 in Left Forearm <1 day                    Physical Exam  HENT:      Head: Normocephalic.      Mouth/Throat:      Mouth: Mucous membranes are moist.   Eyes:      Pupils: Pupils are equal,  round, and reactive to light.   Cardiovascular:      Rate and Rhythm: Normal rate.      Comments: JVD around clavicle  Pulmonary:      Effort: Pulmonary effort is normal.   Abdominal:      Comments: Fistula present   Musculoskeletal:         General: Normal range of motion.      Cervical back: Normal range of motion.   Skin:     General: Skin is warm.      Capillary Refill: Capillary refill takes less than 2 seconds.   Neurological:      General: No focal deficit present.      Mental Status: She is alert and oriented to person, place, and time.   Psychiatric:         Mood and Affect: Mood normal.       Significant Labs: All pertinent lab results from the last 24 hours have been reviewed.    Significant Imaging: No new imaging

## 2023-02-03 PROBLEM — Z01.818 PREOP EXAMINATION: Status: ACTIVE | Noted: 2023-01-01

## 2023-02-03 NOTE — ASSESSMENT & PLAN NOTE
History of Cad with PCIx2 LAD in 2012 and LCX 1/2022  Home regimen: DAPT    -continue aspirin  -holding clopidogrel for pending procedure

## 2023-02-03 NOTE — PROGRESS NOTES
[facility] - Surgery ([facility])  Wound Care    Patient Name:  Odette Hart   MRN:  [id number]  Date: 2/3/2023  Diagnosis: Colocutaneous fistula    History:     Past Medical History:   Diagnosis Date    Acute coronary syndrome     Acute exacerbation of chronic obstructive pulmonary disease (COPD) 3/23/2022    Allergy     Arthritis     Cardiomyopathy     CHF (congestive heart failure)     Coronary artery disease     Coronary artery disease of native artery of native heart with stable angina pectoris 4/19/2021 1/24/2022: [facility]: Cath: LAD: Proximal stent patent. LCX: Proximal 80%. LCX: Dominant. Moderate disease. LCX: HEVER 2.75 x 18 mm. Distal dissection. HEVER 2.75 x 22 mm.     Diverticulitis     Diverticulosis     Familial hypercholesterolemia 1/22/2022    Former smoker 1/24/2022    Heart attack     Heart disease     History of myocardial infarction 1/24/2022    Hyperlipidemia     Hypertension     Hypertension 1/24/2022    ICD (implantable cardioverter-defibrillator) in place     Non-ST elevation myocardial infarction (NSTEMI) 2/4/2019       Social History     Socioeconomic History    Marital status:    Tobacco Use    Smoking status: Former     Packs/day: 0.50     Types: Cigarettes     Start date: 2/4/1976     Quit date: 12/4/2012     Years since quitting: 10.1    Smokeless tobacco: Never   Substance and Sexual Activity    Alcohol use: No    Drug use: No     Social Determinants of Health     Financial Resource Strain: Low Risk     Difficulty of Paying Living Expenses: Not hard at all   Food Insecurity: No Food Insecurity    Worried About Running Out of Food in the Last Year: Never true    Ran Out of Food in the Last Year: Never true   Transportation Needs: No Transportation Needs    Lack of Transportation (Medical): No    Lack of Transportation (Non-Medical): No   Physical Activity: Inactive    Days of Exercise per Week: 0 days    Minutes of Exercise per Session: 0 min   Stress: No Stress Concern Present     Feeling of Stress : Not at all   Social Connections: Moderately Integrated    Frequency of Communication with Friends and Family: More than three times a week    Frequency of Social Gatherings with Friends and Family: More than three times a week    Attends Anabaptism Services: 1 to 4 times per year    Active Member of Clubs or Organizations: Yes    Attends Club or Organization Meetings: 1 to 4 times per year    Marital Status:    Housing Stability: Low Risk     Unable to Pay for Housing in the Last Year: No    Number of Places Lived in the Last Year: 1    Unstable Housing in the Last Year: No       Precautions:     Allergies as of 01/31/2023 - Reviewed 01/31/2023   Allergen Reaction Noted    Augmentin [amoxicillin-pot clavulanate] Swelling 02/19/2018    Lisinopril Other (See Comments) 02/19/2018    Shellfish containing products Anaphylaxis 09/10/2018    Levaquin [levofloxacin] Other (See Comments) 09/18/2018    Clindamycin Palpitations 01/21/2019       [facility] Assessment Details/Treatment     Wound and ostomy consult received. Patient with large midline abdominal colocutaneous fistula. Subjective Assessment as Narrative)    *** (Insert flowsheet data here)    Recommendations made to primary team for *** . Orders placed.     02/06/2023          Pouch change with large convatech pouch to fistula  Foam dsg to stoma - no output  POA Pressure injury with small drg, unsure stage, at least full   Platinum with liana

## 2023-02-03 NOTE — PROGRESS NOTES
Patient is scheduled for REVISION, COLOSTOMY (N/A)  SIGMOIDOSCOPY, FLEXIBLE (N/A)  INSERTION, CATHETER, URETER, BILATERAL (N/A)  RECONSTRUCTION - abdominal wall (N/A)  REPAIR, HERNIA w/mesh, COMPONENT SEPARATION (N/A)   on 2/6/2023.      Patient has been identified as having an implanted cardiac rhythm device (CRD); the implanted device is a [employer] ICD.     No noted pacer dependency.       DEFIBRILLATORS/NON-DEPENDENT ANY LOCATION    Per protocol,a magnet should be applied directly over the implanted device during entire surgical procedure when electrocautery will be used.    If no electrocautery is planned, magnet application is not needed.    For additional questions, please contact the Arrhythmia Department at Ext [phone].

## 2023-02-03 NOTE — PROGRESS NOTES
Patient Name: Odette Hart  Date: 2/2/2023  Service: Colon and Rectal Surgery    Subjective:  No acute events overnight. Pain well controlled with intermittent oral analgesia. Tolerating regular diet without nausea/vomiting and passage of flatus/stool into bag. Voiding freely without symptoms of retention. Denies fevers/chills/sweats, lightheadedness/dizziness, or chest pain/shortness of breath.     Medications:    Current Facility-Administered Medications:     acetaminophen tablet 1,000 mg, 1,000 mg, Oral, Q8H PRN, Pablo Ramirez MD, 1,000 mg at 02/01/23 1124    albuterol inhaler 2 puff, 2 puff, Inhalation, Q6H PRN, Pablo Ramirez MD    ALPRAZolam tablet 0.5 mg, 0.5 mg, Oral, Nightly PRN, Pablo Ramirez MD    aspirin EC tablet 81 mg, 81 mg, Oral, Daily, Pablo Ramirez MD, 81 mg at 02/02/23 0821    dextrose 10% bolus 125 mL 125 mL, 12.5 g, Intravenous, PRN, Pablo Ramirez MD    dextrose 10% bolus 250 mL 250 mL, 25 g, Intravenous, PRN, Pablo Ramirez MD    enoxaparin injection 40 mg, 40 mg, Subcutaneous, Daily, Pablo Ramirez MD    ezetimibe tablet 10 mg, 10 mg, Oral, Daily, Pablo Ramirez MD, 10 mg at 02/02/23 0821    famotidine tablet 20 mg, 20 mg, Oral, Daily, Pablo Ramirez MD, 20 mg at 02/02/23 0821    fluticasone propionate 50 mcg/actuation nasal spray 50 mcg, 1 spray, Each Nostril, Daily PRN, Pablo Ramirez MD    furosemide injection 80 mg, 80 mg, Intravenous, Q12H, Wesley Wilder MD, 80 mg at 02/02/23 0937    losartan tablet 25 mg, 25 mg, Oral, Daily, Pablo Ramirez MD    metoprolol succinate (TOPROL-XL) 24 hr split tablet 12.5 mg, 12.5 mg, Oral, Daily, Pablo Ramirez MD, 12.5 mg at 02/02/23 0821    naloxone 0.4 mg/mL injection 0.02 mg, 0.02 mg, Intravenous, PRN, Pablo Ramirez MD    nitroGLYCERIN SL tablet 0.4 mg, 0.4 mg, Sublingual, Q5 Min PRN, Pablo Ramirez MD    ondansetron injection 4 mg, 4 mg, Intravenous, Q6H PRN, Pablo Ramirez MD, 4 mg at  01/31/23 1602    oxyCODONE immediate release tablet 5 mg, 5 mg, Oral, Q4H PRN, Pablo Ramirez MD, 5 mg at 02/02/23 2000    polyethylene glycol packet 17 g, 17 g, Oral, BID, Pablo Ramirez MD, 17 g at 02/02/23 2000    prochlorperazine injection Soln 5 mg, 5 mg, Intravenous, Q6H PRN, Pablo Ramirez MD    sodium chloride 0.9% flush 10 mL, 10 mL, Intravenous, PRN, Pablo Ramirez MD    Vital Signs:  Vitals:    02/02/23 2000   BP:    Pulse:    Resp: 18   Temp:        In/Out:  Intake/Output - Last 3 Shifts         01/31 0700  02/01 0659 02/01 0700 02/02 0659 02/02 0700 02/03 0659    Urine (mL/kg/hr)   1500 (1.9)    Stool  450     Total Output  450 1500    Net  -450 -1500           Stool Occurrence 0 x              Physical Exam:  General: Alert, oriented, in no apparent distress  HEENT: Sclera anicteric, trachea midline  Lungs: Normal respiratory rate and effort on room air  Abdomen: Soft, nondistended. Midline colocutaneous fistula with ~2-3cm prolapse easily reducible, bowel pink/well perfused with flatus and soft brown stool in ostomy bag without melena/hematochezia; LUQ end colostomy pink/well perfused without output.  Extremities: Warm, well perfused, 1+ BL LE edema, SCDs in place  Neuro: Grossly intact, moves all extremities  Psych: Appropriate affect    Laboratory Studies:  Complete Blood Count:  Lab Results   Component Value Date/Time    WBC 2.32 (L) 02/01/2023 06:06 AM    HGB 11.1 (L) 02/01/2023 06:06 AM    HCT 36.3 (L) 02/01/2023 06:06 AM    HCT 46 05/06/2022 09:55 PM    RBC 4.65 02/01/2023 06:06 AM     02/01/2023 06:06 AM       Basic Chemistry Panel:  Lab Results   Component Value Date/Time     (L) 02/02/2023 05:29 PM    K 3.5 02/02/2023 04:30 AM    CL 92 (L) 02/02/2023 04:30 AM    CO2 24 02/02/2023 04:30 AM    BUN 20 02/02/2023 04:30 AM    CREATININE 0.8 02/02/2023 04:30 AM    CALCIUM 8.1 (L) 02/02/2023 04:30 AM       Hepatic Panel:  Lab Results   Component Value Date/Time    AST 51  (H) 01/31/2023 04:42 PM    ALT 46 (H) 01/31/2023 04:42 PM    ALKPHOS 200 (H) 01/31/2023 04:42 PM    BILITOT 1.4 (H) 01/31/2023 04:42 PM    ALBUMIN 2.0 (L) 01/31/2023 04:42 PM       Coagulation Panel:  Lab Results   Component Value Date/Time    INR 1.2 07/21/2022 04:28 AM     Imaging Studies:  XR Chest (2/1/23):  Continued demonstration of cardiomegaly and mild accentuation of pulmonary interstitial markings in both lower lung zones, but allowing for a slightly poorer inspiratory depth level on the current examination there has been no significant detrimental interval change in the appearance of the chest since 12/08/2022.    Assessment:  Odette Hart is a 67 y.o. year old female with history of HTN, CAD s/p PCI c/b HFrEF (EF 10%), MDD, debility, severe malnutrition, and open cholecystectomy and takedown of colovesical and colovaginal fistuli (two separate areas) with sigmoid colectomy, partial cecectomy and appendectomy, drainage of intra-abdominal abscess and end colostomy 5/9/2022 recently admitted 12/22/22 - 12/30/22 for new colocutaneous fistula who presents with incarcerated colonic prolapse from her fistula s/p manual reduction in clinic with small persistent prolapse but without recurrent incarceration.    Plan:  - Tylenol/Tramadol/Oxy prn pain control  - Regular diet, Miralax BID for known chronic constipation  - Appreciate cardiology recs for preoperative optimization and hyponatremia in setting of fluid overload/elevated BNP  - Asymptomatic hyponatremia (likely hypervolemic), cont serial Na with diuresis  - Cont home meds, hold Plavix in preoperative setting  - Dispo: ongoing, GISSU    Patient discussed with attending, Dr. Cardozo.    Pablo Ramirez MD  Colon and Rectal Surgery Fellow

## 2023-02-03 NOTE — HPI
68 yo F with HTN, HLD (statin intolerance), DM, CAD s/p PCI to LAD (2012)/LCX (1/2022), HFrEF (EF10%), debility, severe malnutrition, and the following procedures performed 5/2022: cholecystectomy, takedown of colovesical and colovaginal fistula, sigmoid colectomy, partial cecectomy, end colostomy who presents for colocutaneous fistula repair with abdominal wall reconstruction. On admission she was found to be hyponatremic and in acute on chronic decompensated heart failure. Cardiology was consulted to manage her HF and have since signed off as her volume status has become more euvolemic following diuresis. Medicine was consulted for pr-eop evaluation and continued management of her hyponatremia. Per chart review patient is chronically, mildly hyponatremic at baseline. She takes Lasix, Toprol, spironolactone, and losartan for her HFrEF. She reports correct use of her home medications. She states her diet consists of peanut butter and crackers. Denies CP/SOB/nausea vomiting, cough fever chills, polydipsia. Her Na has been improving with diuresis. Patient has multiple risk factors that complicate surgery: acute on chronic decompensated HF, CAD with PCIx2, hypertension, hyperlipidemia, malnutrition. This being said she does not have reported/documented/observed dysfunction of liver, kidney, lung, clotting.    On examination, she is chronically ill appearing/in no acute distress/at mental baseline. AF, /58 (78), sat 98% on RA, no leukocytosis, hgb 11.8, Hct 38.5, Plts 237, moderate Hyponatremia Na 124 (initially 122 on admission) with labile Baseline, K 3.1, Cl 85, Bun 13, Cr 00.8, Ca 7.9, albumin 2-3. BNP 2600, UOP 2300 on lasix 80mg    CXR with mild pulmonary congestion

## 2023-02-03 NOTE — NURSING
Messaged wound care RN, requested to please assess colostomy. Leaking & unable to locate correct size on unit

## 2023-02-03 NOTE — PROGRESS NOTES
[facility]  Cardiology  Progress Note    Patient Name: Odette Hart  MRN: [id number]  Admission Date: 1/31/2023  Hospital Length of Stay: 3 days  Code Status: Full Code   Attending Physician: Rafa Cardozo MD   Primary Care Physician: Braxton Barragan MD  Expected Discharge Date: 2/7/2023  Principal Problem:Ischemic cardiomyopathy    Subjective:     Hospital Course:   No notes on file    Interval History: Patient without new complaints. HDS, AF. She remains chest pain free, dyspnea free. Has been able to walk up and down the halls without issues. Only complaint is urinating frequently at night due to lasix night time dose.     Review of Systems   Constitutional: Negative for chills and fever.   HENT:  Negative for congestion and sore throat.    Eyes:  Negative for blurred vision and double vision.   Cardiovascular:  Negative for chest pain and palpitations.   Respiratory:  Negative for cough and sputum production.    Endocrine: Negative for polydipsia and polyphagia.   Musculoskeletal:  Negative for back pain and myalgias.   Gastrointestinal:  Negative for bloating, diarrhea, nausea and vomiting.   Genitourinary:  Negative for flank pain, frequency and hematuria.   Neurological:  Negative for numbness and weakness.   Psychiatric/Behavioral:  Negative for altered mental status. The patient is not nervous/anxious.    Objective:     Vital Signs (Most Recent):  Temp: 97.9 °F (36.6 °C) (02/03/23 1101)  Pulse: 86 (02/03/23 1101)  Resp: 18 (02/03/23 1101)  BP: (!) 107/58 (02/03/23 1101)  SpO2: 98 % (02/03/23 1101)   Vital Signs (24h Range):  Temp:  [97.9 °F (36.6 °C)-99.6 °F (37.6 °C)] 97.9 °F (36.6 °C)  Pulse:  [82-93] 86  Resp:  [16-18] 18  SpO2:  [95 %-99 %] 98 %  BP: (102-125)/(55-60) 107/58     Weight: 56.2 kg (124 lb)  Body mass index is 21.97 kg/m².     SpO2: 98 %         Intake/Output Summary (Last 24 hours) at 2/3/2023 1302  Last data filed at 2/2/2023 2319  Gross per 24 hour   Intake --   Output 6342  ml   Net -2300 ml       Lines/Drains/Airways       Drain  Duration                  Colostomy 05/09/22 1100 Descending/sigmoid  days              Peripheral Intravenous Line  Duration                  Peripheral IV - Single Lumen 02/01/23 1530 20 G;1 3/4 in Left Forearm 1 day                    Physical Exam  HENT:      Head: Normocephalic.      Nose: Nose normal.      Mouth/Throat:      Mouth: Mucous membranes are moist.   Eyes:      Pupils: Pupils are equal, round, and reactive to light.   Cardiovascular:      Rate and Rhythm: Normal rate.      Comments: JVD to clavicle   Pulmonary:      Effort: Pulmonary effort is normal.   Abdominal:      General: Abdomen is flat.   Musculoskeletal:         General: Normal range of motion.      Cervical back: Normal range of motion.   Skin:     General: Skin is warm.      Capillary Refill: Capillary refill takes less than 2 seconds.   Neurological:      General: No focal deficit present.      Mental Status: She is alert and oriented to person, place, and time.   Psychiatric:         Mood and Affect: Mood normal.       Significant Labs: All pertinent lab results from the last 24 hours have been reviewed.    Significant Imaging:  No new imaging    Assessment and Plan:     Brief HPI: 66 y/o with HFrEF 10%, CAD, DM, HLD, HTN who presents for colocutaneous fistula repair. Found to be hyponatremic, volume overloaded. Currently being diuresed. Optimizing for surgery next week.     * Ischemic cardiomyopathy  Acute on chronic combined systolic and diastolic heart failure  Recent echo showed EF of 10%  Patient appears volume overloaded on examination  Currently on Toprol-XL 25, losartan 25 mg.  CXR without new detrimental changes since prior, BNP 2600     Plan  - Patient appears euvolemic on exam recommend transition to oral 80mg bid.    - Replete Mag>2, K>4  - Continue Toprol-XL 12.5      Hyponatremia  Patient has hypervolemic hyponatremia from volume overload. Improving with  diuresis.  Recommend to monitor sodium with diuresis. check sodium every 8 hours.  - recommend trial of tolvaptan 15mg 1 time dose    Coronary artery disease  History of anterior wall MI with LAD stent in 2012 and circumflex stent in January 2022.  Continue ASA, Lipitor,  Ezetimibe  Plavix on hold for surgery          VTE Risk Mitigation (From admission, onward)           Ordered     enoxaparin injection 40 mg  Daily         01/31/23 1202     IP VTE HIGH RISK PATIENT  Once         01/31/23 1202     Place sequential compression device  Until discontinued         01/31/23 1202                    Regi Tapia, DO  Cardiology  [facility]

## 2023-02-03 NOTE — ASSESSMENT & PLAN NOTE
Patient has multiple risk factors that complicate surgery: acute on chronic decompensated HF, CAD with PCIx2, hypertension, hyperlipidemia, malnutrition. This being said she does not have reported/documented/observed dysfunction of liver, kidney, lung, clotting. From medicine standpoint, patients need for procedure currently outweigh her risks. Will defer to cardiology for cardiac risk assessment

## 2023-02-03 NOTE — CONSULTS
RD consulted for preop low albumin, needs higher protein intake.    Albumin level indicates increased risk morbidity/mortality but is no longer used as a direct marker, determinant or characteristic of protein-calorie malnutrition or nutritional status (It more closely reflects overall inflammatory status related to the metabolic stress response). Serum albumin and pre-albumin are negative acute phase reactants and are known to decrease in the presence of inflammation/injury/chronic illness. Albumin half-life is about 3 weeks, and pre-albumin is 2-3 days. Increasing patients protein intake will not necessarily have any effect on albumin level.      Recommendations    1) Continue regular diet - add cardiac restrictions as need   2) Add Boost Plus TID to optimize protein/calories intake   3) RD following    Goals: meet % een/epn by next rd f/u  Nutrition Goal Status: new      Re-consult as needed    Thanks,     Justina Perez, Registration Eligible, Provisional LDN

## 2023-02-03 NOTE — SUBJECTIVE & OBJECTIVE
Past Medical History:   Diagnosis Date    Acute coronary syndrome     Acute exacerbation of chronic obstructive pulmonary disease (COPD) 3/23/2022    Allergy     Arthritis     Cardiomyopathy     CHF (congestive heart failure)     Coronary artery disease     Coronary artery disease of native artery of native heart with stable angina pectoris 2021: [facility]: Cath: LAD: Proximal stent patent. LCX: Proximal 80%. LCX: Dominant. Moderate disease. LCX: HEVER 2.75 x 18 mm. Distal dissection. HEVER 2.75 x 22 mm.     Diverticulitis     Diverticulosis     Familial hypercholesterolemia 2022    Former smoker 2022    Heart attack     Heart disease     History of myocardial infarction 2022    Hyperlipidemia     Hypertension     Hypertension 2022    ICD (implantable cardioverter-defibrillator) in place     Non-ST elevation myocardial infarction (NSTEMI) 2019       Past Surgical History:   Procedure Laterality Date    APPENDECTOMY N/A 2022    Procedure: APPENDECTOMY;  Surgeon: Rafa Cardozo MD;  Location: [facility] 2ND F[facility];  Service: Colon and Rectal;  Laterality: N/A;    ATRIAL CARDIAC PACEMAKER INSERTION      CECECTOMY N/A 2022    Procedure: EXCISION, CECUM;  Surgeon: Rafa Cardozo MD;  Location: [facility];  Service: Colon and Rectal;  Laterality: N/A;     SECTION      CHOLECYSTECTOMY N/A 2022    Procedure: CHOLECYSTECTOMY;  Surgeon: Doug Epstein MD;  Location: [facility]LR;  Service: General;  Laterality: N/A;    COLONOSCOPY N/A 2022    Procedure: COLONOSCOPY;  Surgeon: Rafa Cardozo MD;  Location: [facility];  Service: Colon and Rectal;  Laterality: N/A;    COLOSTOMY  2022    Procedure: CREATION, COLOSTOMY;  Surgeon: Rafa Cardozo MD;  Location: [facility]ND FLR;  Service: Colon and Rectal;;    CORONARY STENT PLACEMENT      LEFT HEART CATHETERIZATION Left 2022    Procedure: CATHETERIZATION, HEART, LEFT;  Surgeon: Yohannes  MD Tasha;  Location: [facility] CATH LAB;  Service: Cardiology;  Laterality: Left;       Review of patient's allergies indicates:   Allergen Reactions    Augmentin [amoxicillin-pot clavulanate] Swelling     Not allergic to amoxicillin just a derivative of augmentin    Lisinopril Other (See Comments)     Fluid around heart    Shellfish containing products Anaphylaxis     Pt.states she is allergic to SEAFOOD since the age of 13    Levaquin [levofloxacin] Other (See Comments)     Very bad joint pain    Clindamycin Palpitations     Chest pain       No current facility-administered medications on file prior to encounter.     Current Outpatient Medications on File Prior to Encounter   Medication Sig    acetaminophen (TYLENOL) 500 MG tablet Take 2 tablets (1,000 mg total) by mouth every 8 (eight) hours as needed for Pain.    albuterol (PROVENTIL/VENTOLIN HFA) 90 mcg/actuation inhaler Inhale 2 puffs into the lungs every 6 (six) hours as needed for Wheezing. Rescue (Patient not taking: Reported on 1/25/2023)    ALPRAZolam (XANAX) 0.5 MG tablet Take 1 tablet (0.5 mg total) by mouth nightly as needed for Anxiety.    aspirin (ECOTRIN) 81 MG EC tablet Take 1 tablet (81 mg total) by mouth once daily.    clopidogreL (PLAVIX) 75 mg tablet Take 1 tablet (75 mg total) by mouth once daily.    docusate sodium (COLACE) 100 MG capsule Take 1 capsule (100 mg total) by mouth 2 (two) times daily.    empagliflozin (JARDIANCE) 10 mg tablet Take 1 tablet (10 mg total) by mouth once daily.    ezetimibe (ZETIA) 10 mg tablet Take 1 tablet (10 mg total) by mouth once daily.    famotidine (PEPCID) 20 MG tablet Take 1 tablet (20 mg total) by mouth once daily.    fluticasone propionate (FLONASE) 50 mcg/actuation nasal spray 1 spray (50 mcg total) by Each Nostril route once daily.    food supplemt, lactose-reduced (ENSURE HIGH PROTEIN) Liqd Take 240 mLs by mouth 2 (two) times a day.    furosemide (LASIX) 40 MG tablet Take 1 tablet (40 mg total) by  mouth once daily.    HYDROcodone-acetaminophen (NORCO) 5-325 mg per tablet Take 1 tablet by mouth every 8 (eight) hours as needed for Pain.    losartan (COZAAR) 25 MG tablet Take 1 tablet (25 mg total) by mouth once daily.    metoprolol succinate (TOPROL-XL) 25 MG 24 hr tablet Take 1 tablet (25 mg total) by mouth once daily.    nitroGLYCERIN (NITROSTAT) 0.4 MG SL tablet Place 1 tablet (0.4 mg total) under the tongue every 5 (five) minutes as needed for Chest pain. (Patient not taking: Reported on 1/27/2023)    ondansetron (ZOFRAN-ODT) 8 MG TbDL Take 1 tablet (8 mg total) by mouth every 12 (twelve) hours as needed (nausea).    simethicone (MYLICON) 80 MG chewable tablet Take 1 tablet (80 mg total) by mouth every 8 (eight) hours as needed for Flatulence.    spironolactone (ALDACTONE) 25 MG tablet Take 0.5 tablets (12.5 mg total) by mouth once daily.    [DISCONTINUED] pantoprazole (PROTONIX) 40 MG tablet Take 1 tablet (40 mg total) by mouth once daily.    [DISCONTINUED] prasugreL (EFFIENT) 10 mg Tab Take 1 tablet (10 mg total) by mouth once daily.     Family History       Problem Relation (Age of Onset)    Emphysema Father    Heart attack Brother    Heart disease Mother          Tobacco Use    Smoking status: Former     Packs/day: 0.50     Types: Cigarettes     Start date: 2/4/1976     Quit date: 12/4/2012     Years since quitting: 10.1    Smokeless tobacco: Never   Substance and Sexual Activity    Alcohol use: No    Drug use: No    Sexual activity: Not on file     Review of Systems   Constitutional:  Negative for chills and fever.   HENT:  Negative for congestion, rhinorrhea and sore throat.    Respiratory:  Negative for cough, shortness of breath and wheezing.    Cardiovascular:  Positive for leg swelling. Negative for chest pain and palpitations.   Gastrointestinal:  Positive for nausea. Negative for abdominal pain and vomiting.   Endocrine: Negative for polydipsia.   Genitourinary:  Negative for difficulty  urinating, dysuria and hematuria.   Skin:  Positive for wound (colosotomy with colocutaneous fistula).   Neurological:  Negative for dizziness, syncope, weakness, light-headedness and headaches.   Psychiatric/Behavioral:  Negative for agitation and confusion.    Objective:     Vital Signs (Most Recent):  Temp: 97.9 °F (36.6 °C) (02/03/23 1101)  Pulse: 86 (02/03/23 1101)  Resp: 18 (02/03/23 1101)  BP: (!) 107/58 (02/03/23 1101)  SpO2: 98 % (02/03/23 1101)   Vital Signs (24h Range):  Temp:  [97.9 °F (36.6 °C)-99.6 °F (37.6 °C)] 97.9 °F (36.6 °C)  Pulse:  [82-93] 86  Resp:  [16-18] 18  SpO2:  [95 %-99 %] 98 %  BP: (102-125)/(55-60) 107/58     Weight: 56.2 kg (124 lb)  Body mass index is 21.97 kg/m².    Physical Exam  Vitals and nursing note reviewed.   Constitutional:       General: She is not in acute distress.     Appearance: She is ill-appearing.   HENT:      Head: Normocephalic and atraumatic.      Right Ear: External ear normal.      Left Ear: External ear normal.      Nose: No congestion or rhinorrhea.   Eyes:      General:         Right eye: No discharge.         Left eye: No discharge.      Extraocular Movements: Extraocular movements intact.      Pupils: Pupils are equal, round, and reactive to light.   Cardiovascular:      Rate and Rhythm: Normal rate and regular rhythm.      Heart sounds: Normal heart sounds. No murmur heard.  Pulmonary:      Effort: No respiratory distress.      Breath sounds: No wheezing.   Abdominal:      General: There is no distension.      Comments: Colostomy with colocutaneous fistula, colostomy bag in place with normal appearing liquid contents   Musculoskeletal:         General: No tenderness.      Cervical back: Normal range of motion. No rigidity.      Right lower leg: Edema present.      Left lower leg: Edema present.   Skin:     Findings: Bruising present. No erythema.   Neurological:      General: No focal deficit present.      Mental Status: She is alert and oriented to  person, place, and time.   Psychiatric:         Mood and Affect: Mood normal.         Behavior: Behavior normal.       Significant Labs: All pertinent labs within the past 24 hours have been reviewed.  BMP:   Recent Labs   Lab 02/03/23  0440   *   *   K 3.1*   CL 85*   CO2 26   BUN 13   CREATININE 0.8   CALCIUM 7.9*   MG 1.3*     CBC: No results for input(s): WBC, HGB, HCT, PLT in the last 48 hours.    Significant Imaging: I have reviewed all pertinent imaging results/findings within the past 24 hours.

## 2023-02-03 NOTE — ASSESSMENT & PLAN NOTE
Patient has hypervolemic hyponatremia from volume overload. Improving with diuresis.  Recommend to monitor sodium with diuresis. check sodium every 8 hours.  - recommend trial of tolvaptan 15mg 1 time dose

## 2023-02-03 NOTE — PT/OT/SLP PROGRESS
Physical Therapy Treatment    Patient Name:  Odette Hart   MRN:  [id number]    Recommendations:     Discharge Recommendations: home health PT  Discharge Equipment Recommendations: none  Barriers to discharge: None    Assessment:     Odette Hart is a 67 y.o. female admitted with a medical diagnosis of Ischemic cardiomyopathy.  She presents with the following impairments/functional limitations: weakness, impaired endurance, impaired self care skills, impaired functional mobility, gait instability, impaired balance Pt. cooperative and tolerated treatment well. Pt. progressing with mobility.    Rehab Prognosis: Good; patient would benefit from acute skilled PT services to address these deficits and reach maximum level of function.    Recent Surgery: * No surgery found *      Plan:     During this hospitalization, patient to be seen 3 x/week to address the identified rehab impairments via gait training, therapeutic activities, therapeutic exercises and progress toward the following goals:    Plan of Care Expires:  03/03/23    Subjective     Chief Complaint: abd. discomfort  Patient/Family Comments/goals: pt. Agreeable to PT  Pain/Comfort:  Pain Rating 1:  (pt. did not rate)      Objective:     Communicated with nursing prior to session.  Patient found supine with peripheral IV upon PT entry to room.     General Precautions: Standard, fall  Orthopedic Precautions: N/A  Braces: N/A  Respiratory Status: Room air     Functional Mobility:  Bed Mobility:     Rolling Right: stand by assistance  Scooting: stand by assistance  Supine to Sit: stand by assistance  Transfers:     Sit to Stand:  stand by assistance with no AD  Gait: ~400' with HHA-CGA with decreased step length/jan. Pt. also using hallway handrail  Balance: fair  Stairs:  Pt ascended/descended 3 stair(s) with No Assistive Device with right handrail with Contact Guard Assistance.       AM-PAC 6 CLICK MOBILITY  Turning over in bed (including adjusting  bedclothes, sheets and blankets)?: 4  Sitting down on and standing up from a chair with arms (e.g., wheelchair, bedside commode, etc.): 4  Moving from lying on back to sitting on the side of the bed?: 4  Moving to and from a bed to a chair (including a wheelchair)?: 4  Need to walk in hospital room?: 3  Climbing 3-5 steps with a railing?: 3  Basic Mobility Total Score: 22       Treatment & Education:  Discussed pt.'s progress, goals, and POC    Patient left up in chair with all lines intact and call button in reach..    GOALS:   Multidisciplinary Problems       Physical Therapy Goals          Problem: Physical Therapy    Goal Priority Disciplines Outcome Goal Variances Interventions   Physical Therapy Goal     PT, PT/OT Ongoing, Progressing     Description: Goals to be met by: 2/15/2023     Patient will increase functional independence with mobility by performin. Gait  x 250 feet with Supervision using No Assistive Device.   2. Ascend/descend 4 stair with right Handrails Supervision using No Assistive Device.                          Time Tracking:     PT Received On: 23  PT Start Time: 1441     PT Stop Time: 1457  PT Total Time (min): 16 min     Billable Minutes: Gait Training 16    Treatment Type: Treatment  PT/PTA: PT           2023

## 2023-02-03 NOTE — ANESTHESIA PREPROCEDURE EVALUATION
[facility]  Anesthesia Pre-Operative Evaluation         Patient Name: Odette Hart  YOB: 1955  MRN: [id number]    SUBJECTIVE:     Pre-operative evaluation for Procedure(s) (LRB):  REVISION, COLOSTOMY (N/A)  SIGMOIDOSCOPY, FLEXIBLE (N/A)  INSERTION, CATHETER, URETER, BILATERAL (N/A)  RECONSTRUCTION - abdominal wall (N/A)  REPAIR, HERNIA w/mesh, COMPONENT SEPARATION (N/A)     02/03/2023    Odette Hart is a 67 y.o. 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 She was recently admitted 12/22/22 - 12/30/22 for new colocutaneous fistula who presents with incarcerated colonic prolapse from her fistula who now presents for the above procedure.    Results for orders placed during the hospital encounter of 12/22/22    Echo Saline Bubble? No    Interpretation Summary  · The left ventricle is severely enlarged with eccentric hypertrophy and severely decreased systolic function. The estimated ejection fraction is 10%.  · Normal right ventricular size with mildly reduced right ventricular systolic function.  · Grade II left ventricular diastolic dysfunction.  · Biatrial enlargement.  · The estimated PA systolic pressure is 51 mmHg.  · Normal central venous pressure (3 mmHg).    BMP  Lab Results   Component Value Date     (L) 02/03/2023    K 3.1 (L) 02/03/2023    CL 85 (L) 02/03/2023    CO2 26 02/03/2023    BUN 13 02/03/2023    CREATININE 0.8 02/03/2023    CALCIUM 7.9 (L) 02/03/2023    ANIONGAP 13 02/03/2023    EGFRNORACEVR >60.0 02/03/2023     Lab Results   Component Value Date    WBC 2.32 (L) 02/01/2023    HGB 11.1 (L) 02/01/2023    HCT 36.3 (L) 02/01/2023    MCV 78 (L)  02/01/2023     02/01/2023             LDA:        Peripheral IV - Single Lumen 02/01/23 1530 20 G;1 3/4 in Left Forearm (Active)   Site Assessment Clean;Dry;Intact 02/03/23 0700   Extremity Assessment Distal to IV No abnormal discoloration 02/03/23 0700   Line Status Saline locked 02/03/23 0700   Dressing Status Clean;Dry;Intact 02/03/23 0700   Dressing Intervention Integrity maintained 02/03/23 0700   Site Change Due 02/06/23 02/01/23 1530   Reason Not Rotated Not due 02/03/23 0700   Number of days: 1            Colostomy 05/09/22 1100 Descending/sigmoid LLQ (Active)   Stomal Appliance 1 piece 02/03/23 0700   Stoma Appearance round;pink 02/03/23 0700   Site Assessment Clean;Intact 02/03/23 0700   Peristomal Assessment Clean;Intact 02/03/23 0700   Treatment Site care 02/03/23 0700   Output (mL) 300 mL 02/01/23 1758   Number of days: 270       Prev airway:     Attempted By:  CRNA    Blade:  Leonides 3    Laryngeal View Grade: Grade IIb - only the arytenoids and epiglottis seen      Difficult Airway Encountered?: No      Complications:  None    Airway Device:  Oral endotracheal tube    Airway Device Size:  7.5    Drips: None documented.      Patient Active Problem List   Diagnosis    Ischemic cardiomyopathy    Automatic implantable cardioverter-defibrillator in situ    History of coronary artery stent placement    Chronic congestive heart failure    Elevated rheumatoid factor    RBBB with left anterior fascicular block    History of diverticulitis    Coronary artery disease    Hypercholesterolemia    Normocytic anemia    History of myocardial infarction    Hypertension    Former smoker    Leukopenia    At risk for surgical complication    Anemia    Dizziness    Mood disorder    Alteration in skin integrity in adult    Prolonged Q-T interval on ECG    Hyponatremia    Debility    Recurrent major depressive disorder    Hypotension    VRE (vancomycin-resistant Enterococci) infection     Hypomagnesemia    CAD (coronary artery disease)    Increased nutritional needs    COPD (chronic obstructive pulmonary disease)    Hypoalbuminemia    Severe protein-calorie malnutrition    Abdominal wall cellulitis    Advance care planning    Peripheral edema    History of partial colectomy    Colostomy in place    Asymptomatic bacteriuria    Hypertriglyceridemia    Statin intolerance    Encounter for palliative care    Acute combined systolic and diastolic congestive heart failure    Shortness of breath    Heart failure, systolic and diastolic, chronic    Elevated troponin    Colocutaneous fistula    Pre-operative cardiovascular examination       Review of patient's allergies indicates:   Allergen Reactions    Augmentin [amoxicillin-pot clavulanate] Swelling     Not allergic to amoxicillin just a derivative of augmentin    Lisinopril Other (See Comments)     Fluid around heart    Shellfish containing products Anaphylaxis     Pt.states she is allergic to SEAFOOD since the age of 13    Levaquin [levofloxacin] Other (See Comments)     Very bad joint pain    Clindamycin Palpitations     Chest pain       Current Inpatient Medications:      No current facility-administered medications on file prior to encounter.     Current Outpatient Medications on File Prior to Encounter   Medication Sig Dispense Refill    acetaminophen (TYLENOL) 500 MG tablet Take 2 tablets (1,000 mg total) by mouth every 8 (eight) hours as needed for Pain.  0    albuterol (PROVENTIL/VENTOLIN HFA) 90 mcg/actuation inhaler Inhale 2 puffs into the lungs every 6 (six) hours as needed for Wheezing. Rescue (Patient not taking: Reported on 1/25/2023) 18 g 1    ALPRAZolam (XANAX) 0.5 MG tablet Take 1 tablet (0.5 mg total) by mouth nightly as needed for Anxiety. 30 tablet 1    clopidogreL (PLAVIX) 75 mg tablet Take 1 tablet (75 mg total) by mouth once daily. 90 tablet 3    docusate sodium (COLACE) 100 MG capsule Take 1 capsule  (100 mg total) by mouth 2 (two) times daily. 60 capsule 1    famotidine (PEPCID) 20 MG tablet Take 1 tablet (20 mg total) by mouth once daily. 30 tablet 0    fluticasone propionate (FLONASE) 50 mcg/actuation nasal spray 1 spray (50 mcg total) by Each Nostril route once daily. 9.9 mL 1    food supplemt, lactose-reduced (ENSURE HIGH PROTEIN) Liqd Take 240 mLs by mouth 2 (two) times a day. 60 each 3    nitroGLYCERIN (NITROSTAT) 0.4 MG SL tablet Place 1 tablet (0.4 mg total) under the tongue every 5 (five) minutes as needed for Chest pain. (Patient not taking: Reported on 2023) 25 tablet 3    simethicone (MYLICON) 80 MG chewable tablet Take 1 tablet (80 mg total) by mouth every 8 (eight) hours as needed for Flatulence. 120 tablet 5    [DISCONTINUED] pantoprazole (PROTONIX) 40 MG tablet Take 1 tablet (40 mg total) by mouth once daily. 30 tablet 1    [DISCONTINUED] prasugreL (EFFIENT) 10 mg Tab Take 1 tablet (10 mg total) by mouth once daily. 90 tablet 0       Past Surgical History:   Procedure Laterality Date    APPENDECTOMY N/A 2022    Procedure: APPENDECTOMY;  Surgeon: Rafa Cardozo MD;  [facility];  Service: Colon and Rectal;  Laterality: N/A;    ATRIAL CARDIAC PACEMAKER INSERTION      CECECTOMY N/A 2022    Procedure: EXCISION, CECUM;  Surgeon: Rafa Cardozo MD;  [facility];  Service: Colon and Rectal;  Laterality: N/A;     SECTION      CHOLECYSTECTOMY N/A 2022    Procedure: CHOLECYSTECTOMY;  Surgeon: Doug Epstein MD;  [facility];  Service: General;  Laterality: N/A;    COLONOSCOPY N/A 2022    Procedure: COLONOSCOPY;  Surgeon: Rafa Cardozo MD;  [facility];  Service: Colon and Rectal;  Laterality: N/A;    COLOSTOMY  2022    Procedure: CREATION, COLOSTOMY;  Surgeon: Rafa Cardozo MD;  Location: [facility] OR 2ND FLR;  Service: Colon and Rectal;;    CORONARY STENT PLACEMENT      LEFT HEART  CATHETERIZATION Left 1/24/2022    Procedure: CATHETERIZATION, HEART, LEFT;  Surgeon: Yohannes Cordova MD;  Location: [facility] CATH LAB;  Service: Cardiology;  Laterality: Left;       Social History     Socioeconomic History    Marital status:    Tobacco Use    Smoking status: Former     Packs/day: 0.50     Types: Cigarettes     Start date: 2/4/1976     Quit date: 12/4/2012     Years since quitting: 10.1    Smokeless tobacco: Never   Substance and Sexual Activity    Alcohol use: No    Drug use: No     Social Determinants of Health     Financial Resource Strain: Low Risk     Difficulty of Paying Living Expenses: Not hard at all   Food Insecurity: No Food Insecurity    Worried About Running Out of Food in the Last Year: Never true    Ran Out of Food in the Last Year: Never true   Transportation Needs: No Transportation Needs    Lack of Transportation (Medical): No    Lack of Transportation (Non-Medical): No   Physical Activity: Inactive    Days of Exercise per Week: 0 days    Minutes of Exercise per Session: 0 min   Stress: No Stress Concern Present    Feeling of Stress : Not at all   Social Connections: Moderately Integrated    Frequency of Communication with Friends and Family: More than three times a week    Frequency of Social Gatherings with Friends and Family: More than three times a week    Attends Scientologist Services: 1 to 4 times per year    Active Member of Clubs or Organizations: Yes    Attends Club or Organization Meetings: 1 to 4 times per year    Marital Status:    Housing Stability: Low Risk     Unable to Pay for Housing in the Last Year: No    Number of Places Lived in the Last Year: 1    Unstable Housing in the Last Year: No       OBJECTIVE:     Vital Signs Range:  Vitals - 1 value per visit 2/3/2023 2/3/2023 2/3/2023   SYSTOLIC 110 110 107   DIASTOLIC 57 57 58   Pulse 93 - 86   Temp 98.8 - 97.9   Resp 16 - 18   SPO2 95 - 98   Weight (lb) - - -   Weight (kg) - - -   Height  - - -   BMI (Calculated) - - -   VISIT REPORT - - -   Pain Score  - - -   Some recent data might be hidden         CBC:   Lab Results   Component Value Date    WBC 2.32 (L) 02/01/2023    HGB 11.1 (L) 02/01/2023    HCT 36.3 (L) 02/01/2023    MCV 78 (L) 02/01/2023     02/01/2023         CMP:   Sodium   Date Value Ref Range Status   02/03/2023 124 (L) 136 - 145 mmol/L Final     Potassium   Date Value Ref Range Status   02/03/2023 3.1 (L) 3.5 - 5.1 mmol/L Final     Chloride   Date Value Ref Range Status   02/03/2023 85 (L) 95 - 110 mmol/L Final     CO2   Date Value Ref Range Status   02/03/2023 26 23 - 29 mmol/L Final     Glucose   Date Value Ref Range Status   02/03/2023 158 (H) 70 - 110 mg/dL Final     BUN   Date Value Ref Range Status   02/03/2023 13 8 - 23 mg/dL Final     Creatinine   Date Value Ref Range Status   02/03/2023 0.8 0.5 - 1.4 mg/dL Final     Calcium   Date Value Ref Range Status   02/03/2023 7.9 (L) 8.7 - 10.5 mg/dL Final     Total Protein   Date Value Ref Range Status   01/31/2023 8.3 6.0 - 8.4 g/dL Final     Albumin   Date Value Ref Range Status   01/31/2023 2.0 (L) 3.5 - 5.2 g/dL Final     Total Bilirubin   Date Value Ref Range Status   01/31/2023 1.4 (H) 0.1 - 1.0 mg/dL Final     Comment:     For infants and newborns, interpretation of results should be based  on gestational age, weight and in agreement with clinical  observations.    Premature Infant recommended reference ranges:  Up to 24 hours.............<8.0 mg/dL  Up to 48 hours............<12.0 mg/dL  3-5 days..................<15.0 mg/dL  6-29 days.................<15.0 mg/dL       Alkaline Phosphatase   Date Value Ref Range Status   01/31/2023 200 (H) 55 - 135 U/L Final     AST   Date Value Ref Range Status   01/31/2023 51 (H) 10 - 40 U/L Final     ALT   Date Value Ref Range Status   01/31/2023 46 (H) 10 - 44 U/L Final     Anion Gap   Date Value Ref Range Status   02/03/2023 13 8 - 16 mmol/L Final     eGFR if    Date  Value Ref Range Status   07/23/2022 >60 >60 mL/min/1.73 m^2 Final     eGFR if non    Date Value Ref Range Status   07/23/2022 >60 >60 mL/min/1.73 m^2 Final     Comment:     Calculation used to obtain the estimated glomerular filtration  rate (eGFR) is the CKD-EPI equation.          INR:  Lab Results   Component Value Date    INR 1.2 07/21/2022    INR 1.1 02/26/2022    INR 1.0 01/22/2022       EKG:   Results for orders placed or performed during the hospital encounter of 12/06/22   EKG 12-lead    Collection Time: 12/06/22 11:25 PM    Narrative    Test Reason : M79.89,    Vent. Rate : 083 BPM     Atrial Rate : 083 BPM     P-R Int : 146 ms          QRS Dur : 140 ms      QT Int : 464 ms       P-R-T Axes : 069 -69 -46 degrees     QTc Int : 545 ms    Normal sinus rhythm  Right bundle branch block  Left anterior fascicular block   Bifascicular block   Septal infarct (cited on or before 16-JAN-2019)  Possible Lateral infarct (cited on or before 16-JAN-2019)  Nonspecific ST abnormality  Prolonged QT  Abnormal ECG  When compared with ECG of 05-SEP-2022 09:51,  Right bundle branch block has replaced Nonspecific intraventricular block  Confirmed by Joseluis Medrano MD ([id number]) on 12/10/2022 10:18:13 AM    Referred By: [email or web address]   SELF           Confirmed By:Joseluis Medrano MD        2D ECHO:   Results for orders placed during the hospital encounter of 12/22/22    Echo Saline Bubble? No    Interpretation Summary  · The left ventricle is severely enlarged with eccentric hypertrophy and severely decreased systolic function. The estimated ejection fraction is 10%.  · Normal right ventricular size with mildly reduced right ventricular systolic function.  · Grade II left ventricular diastolic dysfunction.  · Biatrial enlargement.  · The estimated PA systolic pressure is 51 mmHg.  · Normal central venous pressure (3 mmHg).         ASSESSMENT/PLAN:         Pre-op Assessment    I have reviewed the Patient Summary Reports.     I have  reviewed the Nursing Notes. I have reviewed the NPO Status.   I have reviewed the Medications.     Review of Systems  Anesthesia Hx:  No problems with previous Anesthesia  History of prior surgery of interest to airway management or planning:  Denies Personal Hx of Anesthesia complications.   Hematology/Oncology:         -- Anemia:   Cardiovascular:   Hypertension Past MI CAD  Dysrhythmias  Angina CHF    Pulmonary:   COPD    Hepatic/GI:   Multiple GI procedures including colectomy with recurrent fistulas   Psych:   depression          Physical Exam  General: Cooperative, Alert and Oriented  MALNUTRITION; TEETH LOSS  Airway:  Mallampati: II   Mouth Opening: Normal  Tongue: Normal  Neck ROM: Normal ROM    Dental:  Intact, Periodontal disease  MISSING BACK TEETH      Anesthesia Plan  Type of Anesthesia, risks & benefits discussed:    Anesthesia Type: Gen ETT  Intra-op Monitoring Plan: Standard ASA Monitors and Art Line  Post Op Pain Control Plan: multimodal analgesia and IV/PO Opioids PRN  Induction:  IV  Airway Plan: Direct and Video, Post-Induction  Informed Consent: Informed consent signed with the Patient and all parties understand the risks and agree with anesthesia plan.  All questions answered.   ASA Score: 4  Day of Surgery Review of History & Physical: H&P Update referred to the surgeon/provider.    Ready For Surgery From Anesthesia Perspective.     .

## 2023-02-03 NOTE — ASSESSMENT & PLAN NOTE
Chronic mild hyponatremia on chart review, now moderate on presentation  Found to be in ADHF on arrival  BNP 2600, volume overloaded, JVD present, bilateral LE edema  CXR with mild pulmonary congestion  Has since had adequate urine output on Lasix 80mg IV BID, with some improvement of Na  Reports poor diet at home (crackers/peanutbutter)  Reduced albumin likely 2/2 to malnutrition    Plan  --transition lasix Iv to PO 80mg BID  --strict I/Os  --1000cc fluid restriction  --f/u urine osm/serum osm/urine Na

## 2023-02-03 NOTE — NURSING
Pt is refusing ordered IV Lasix 80 mg today. Paged Dr. Cardozo's group x 2. Received return page from resident who stated message needed sent to Dr. Russell. Secured chatted:  That RN spoke with Dr. Borjas's resident, & he stated to contact you. Ms Hart states she was informed her lasix 80 mg IV would be changed to po today, & she has refused administration. Can you help? Call my extension if needed [phone]. Thank you!

## 2023-02-03 NOTE — CONSULTS
[facility] Medicine  Consult Note    Patient Name: Odette Hart  MRN: [id number]  Admission Date: 1/31/2023  Hospital Length of Stay: 3 days  Attending Physician: Rafa Cardozo MD   Primary Care Provider: Braxton Barragan MD           Patient information was obtained from patient and ER records.     Inpatient consult to [facility] Medicine-General  Consult performed by: Fahad Smith MD  Consult ordered by: Pablo Ramirez MD        Subjective:     Principal Problem: Ischemic cardiomyopathy    Chief Complaint: No chief complaint on file.       HPI: 68 yo F with HTN, HLD (statin intolerance), DM, CAD s/p PCI to LAD (2012)/LCX (1/2022), HFrEF (EF10%), debility, severe malnutrition, and the following procedures performed 5/2022: cholecystectomy, takedown of colovesical and colovaginal fistula, sigmoid colectomy, partial cecectomy, end colostomy who presents for colocutaneous fistula repair with abdominal wall reconstruction. On admission she was found to be hyponatremic and in acute on chronic decompensated heart failure. Cardiology was consulted to manage her HF and have since signed off as her volume status has become more euvolemic following diuresis. Medicine was consulted for pr-eop evaluation and continued management of her hyponatremia. Per chart review patient is chronically, mildly hyponatremic at baseline. She takes Lasix, Toprol, spironolactone, and losartan for her HFrEF. She reports correct use of her home medications. She states her diet consists of peanut butter and crackers. Denies CP/SOB/nausea vomiting, cough fever chills, polydipsia. Her Na has been improving with diuresis. Patient has multiple risk factors that complicate surgery: acute on chronic decompensated HF, CAD with PCIx2, hypertension, hyperlipidemia, malnutrition. This being said she does not have reported/documented/observed dysfunction of liver, kidney, lung, clotting.    On examination, she is chronically ill  appearing/in no acute distress/at mental baseline. AF, /58 (78), sat 98% on RA, no leukocytosis, hgb 11.8, Hct 38.5, Plts 237, moderate Hyponatremia Na 124 (initially 122 on admission) with labile Baseline, K 3.1, Cl 85, Bun 13, Cr 00.8, Ca 7.9, albumin 2-3. BNP 2600, UOP 2300 on lasix 80mg    CXR with mild pulmonary congestion      Past Medical History:   Diagnosis Date    Acute coronary syndrome     Acute exacerbation of chronic obstructive pulmonary disease (COPD) 3/23/2022    Allergy     Arthritis     Cardiomyopathy     CHF (congestive heart failure)     Coronary artery disease     Coronary artery disease of native artery of native heart with stable angina pectoris 2021: [facility]: Cath: LAD: Proximal stent patent. LCX: Proximal 80%. LCX: Dominant. Moderate disease. LCX: HEVER 2.75 x 18 mm. Distal dissection. HEVER 2.75 x 22 mm.     Diverticulitis     Diverticulosis     Familial hypercholesterolemia 2022    Former smoker 2022    Heart attack     Heart disease     History of myocardial infarction 2022    Hyperlipidemia     Hypertension     Hypertension 2022    ICD (implantable cardioverter-defibrillator) in place     Non-ST elevation myocardial infarction (NSTEMI) 2019       Past Surgical History:   Procedure Laterality Date    APPENDECTOMY N/A 2022    Procedure: APPENDECTOMY;  Surgeon: Rafa Cardozo MD;  Location: [facility] OR [address] [place] [address];  Service: Colon and Rectal;  Laterality: N/A;    ATRIAL CARDIAC PACEMAKER INSERTION      CECECTOMY N/A 2022    Procedure: EXCISION, CECUM;  Surgeon: Rafa Cardozo MD;  Location: [facility] OR [address] [place] [address];  Service: Colon and Rectal;  Laterality: N/A;     SECTION      CHOLECYSTECTOMY N/A 2022    Procedure: CHOLECYSTECTOMY;  Surgeon: Doug Epstein MD;  Location: [facility] OR [address] [place] [address];  Service: General;  Laterality: N/A;    COLONOSCOPY N/A 2022    Procedure: COLONOSCOPY;  Surgeon: Rafa KO  MD Juliane;  [facility];  Service: Colon and Rectal;  Laterality: N/A;    COLOSTOMY  5/9/2022    Procedure: CREATION, COLOSTOMY;  Surgeon: Rafa Cardozo MD;  [facility];  Service: Colon and Rectal;;    CORONARY STENT PLACEMENT      LEFT HEART CATHETERIZATION Left 1/24/2022    Procedure: CATHETERIZATION, HEART, LEFT;  Surgeon: Yohannes Cordova MD;  Location: [facility] CATH [facility];  Service: Cardiology;  Laterality: Left;       Review of patient's allergies indicates:   Allergen Reactions    Augmentin [amoxicillin-pot clavulanate] Swelling     Not allergic to amoxicillin just a derivative of augmentin    Lisinopril Other (See Comments)     Fluid around heart    Shellfish containing products Anaphylaxis     Pt.states she is allergic to SEAFOOD since the age of 13    Levaquin [levofloxacin] Other (See Comments)     Very bad joint pain    Clindamycin Palpitations     Chest pain       No current facility-administered medications on file prior to encounter.     Current Outpatient Medications on File Prior to Encounter   Medication Sig    acetaminophen (TYLENOL) 500 MG tablet Take 2 tablets (1,000 mg total) by mouth every 8 (eight) hours as needed for Pain.    albuterol (PROVENTIL/VENTOLIN HFA) 90 mcg/actuation inhaler Inhale 2 puffs into the lungs every 6 (six) hours as needed for Wheezing. Rescue (Patient not taking: Reported on 1/25/2023)    ALPRAZolam (XANAX) 0.5 MG tablet Take 1 tablet (0.5 mg total) by mouth nightly as needed for Anxiety.    aspirin (ECOTRIN) 81 MG EC tablet Take 1 tablet (81 mg total) by mouth once daily.    clopidogreL (PLAVIX) 75 mg tablet Take 1 tablet (75 mg total) by mouth once daily.    docusate sodium (COLACE) 100 MG capsule Take 1 capsule (100 mg total) by mouth 2 (two) times daily.    empagliflozin (JARDIANCE) 10 mg tablet Take 1 tablet (10 mg total) by mouth once daily.    ezetimibe (ZETIA) 10 mg tablet Take 1 tablet (10 mg total) by mouth once  daily.    famotidine (PEPCID) 20 MG tablet Take 1 tablet (20 mg total) by mouth once daily.    fluticasone propionate (FLONASE) 50 mcg/actuation nasal spray 1 spray (50 mcg total) by Each Nostril route once daily.    food supplemt, lactose-reduced (ENSURE HIGH PROTEIN) Liqd Take 240 mLs by mouth 2 (two) times a day.    furosemide (LASIX) 40 MG tablet Take 1 tablet (40 mg total) by mouth once daily.    HYDROcodone-acetaminophen (NORCO) 5-325 mg per tablet Take 1 tablet by mouth every 8 (eight) hours as needed for Pain.    losartan (COZAAR) 25 MG tablet Take 1 tablet (25 mg total) by mouth once daily.    metoprolol succinate (TOPROL-XL) 25 MG 24 hr tablet Take 1 tablet (25 mg total) by mouth once daily.    nitroGLYCERIN (NITROSTAT) 0.4 MG SL tablet Place 1 tablet (0.4 mg total) under the tongue every 5 (five) minutes as needed for Chest pain. (Patient not taking: Reported on 1/27/2023)    ondansetron (ZOFRAN-ODT) 8 MG TbDL Take 1 tablet (8 mg total) by mouth every 12 (twelve) hours as needed (nausea).    simethicone (MYLICON) 80 MG chewable tablet Take 1 tablet (80 mg total) by mouth every 8 (eight) hours as needed for Flatulence.    spironolactone (ALDACTONE) 25 MG tablet Take 0.5 tablets (12.5 mg total) by mouth once daily.    [DISCONTINUED] pantoprazole (PROTONIX) 40 MG tablet Take 1 tablet (40 mg total) by mouth once daily.    [DISCONTINUED] prasugreL (EFFIENT) 10 mg Tab Take 1 tablet (10 mg total) by mouth once daily.     Family History       Problem Relation (Age of Onset)    Emphysema Father    Heart attack Brother    Heart disease Mother          Tobacco Use    Smoking status: Former     Packs/day: 0.50     Types: Cigarettes     Start date: 2/4/1976     Quit date: 12/4/2012     Years since quitting: 10.1    Smokeless tobacco: Never   Substance and Sexual Activity    Alcohol use: No    Drug use: No    Sexual activity: Not on file     Review of Systems   Constitutional:  Negative for chills and  fever.   HENT:  Negative for congestion, rhinorrhea and sore throat.    Respiratory:  Negative for cough, shortness of breath and wheezing.    Cardiovascular:  Positive for leg swelling. Negative for chest pain and palpitations.   Gastrointestinal:  Positive for nausea. Negative for abdominal pain and vomiting.   Endocrine: Negative for polydipsia.   Genitourinary:  Negative for difficulty urinating, dysuria and hematuria.   Skin:  Positive for wound (colosotomy with colocutaneous fistula).   Neurological:  Negative for dizziness, syncope, weakness, light-headedness and headaches.   Psychiatric/Behavioral:  Negative for agitation and confusion.    Objective:     Vital Signs (Most Recent):  Temp: 97.9 °F (36.6 °C) (02/03/23 1101)  Pulse: 86 (02/03/23 1101)  Resp: 18 (02/03/23 1101)  BP: (!) 107/58 (02/03/23 1101)  SpO2: 98 % (02/03/23 1101)   Vital Signs (24h Range):  Temp:  [97.9 °F (36.6 °C)-99.6 °F (37.6 °C)] 97.9 °F (36.6 °C)  Pulse:  [82-93] 86  Resp:  [16-18] 18  SpO2:  [95 %-99 %] 98 %  BP: (102-125)/(55-60) 107/58     Weight: 56.2 kg (124 lb)  Body mass index is 21.97 kg/m².    Physical Exam  Vitals and nursing note reviewed.   Constitutional:       General: She is not in acute distress.     Appearance: She is ill-appearing.   HENT:      Head: Normocephalic and atraumatic.      Right Ear: External ear normal.      Left Ear: External ear normal.      Nose: No congestion or rhinorrhea.   Eyes:      General:         Right eye: No discharge.         Left eye: No discharge.      Extraocular Movements: Extraocular movements intact.      Pupils: Pupils are equal, round, and reactive to light.   Cardiovascular:      Rate and Rhythm: Normal rate and regular rhythm.      Heart sounds: Normal heart sounds. No murmur heard.  Pulmonary:      Effort: No respiratory distress.      Breath sounds: No wheezing.   Abdominal:      General: There is no distension.      Comments: Colostomy with colocutaneous fistula, colostomy bag  in place with normal appearing liquid contents   Musculoskeletal:         General: No tenderness.      Cervical back: Normal range of motion. No rigidity.      Right lower leg: Edema present.      Left lower leg: Edema present.   Skin:     Findings: Bruising present. No erythema.   Neurological:      General: No focal deficit present.      Mental Status: She is alert and oriented to person, place, and time.   Psychiatric:         Mood and Affect: Mood normal.         Behavior: Behavior normal.       Significant Labs: All pertinent labs within the past 24 hours have been reviewed.  BMP:   Recent Labs   Lab 02/03/23  0440   *   *   K 3.1*   CL 85*   CO2 26   BUN 13   CREATININE 0.8   CALCIUM 7.9*   MG 1.3*     CBC: No results for input(s): WBC, HGB, HCT, PLT in the last 48 hours.    Significant Imaging: I have reviewed all pertinent imaging results/findings within the past 24 hours.    Assessment/Plan:     * Ischemic cardiomyopathy  Cardiology consulted for management of ADHF, now signed off  Recent echo showed EF of 10%  Patient hypervolemic on presentation, appears more euvolemic today  Home GDMT: Toprol-XL 25, losartan 25 mg.  Initial CXR without new detrimental changes since prior, BNP 2600 on arrival     Started on IV lasix, now transitioning to PO lasix 80mg BID  Adequate urine output on IV lasix, 2300cc     Plan  --Agree with cardiology recommendation to transition IV lasix to oral 80mg bid.   --Replete Mag>2, K>4  --Continue Toprol-XL 12.5 per cardiology recommendations  --continue losartan 25mg daily    Preop examination  Patient has multiple risk factors that complicate surgery: acute on chronic decompensated HF, CAD with PCIx2, hypertension, hyperlipidemia, malnutrition. This being said she does not have reported/documented/observed dysfunction of liver, kidney, lung, clotting. From medicine standpoint, patients need for procedure currently outweigh her risks. Will defer to cardiology for  cardiac risk assessment    Colocutaneous fistula  Managed by CRS    Coronary artery disease  History of Cad with PCIx2 LAD in 2012 and LCX 1/2022  Home regimen: DAPT    -continue aspirin  -holding clopidogrel for pending procedure    Hypertension  Pressures are well maintained  Continue Losartan 25mg daily and Metoprolol 12.5 mg daily    Hypercholesterolemia  Lipids well controlled  Has documented statin intolerance  Continue ezetimibe    Hyponatremia  Chronic mild hyponatremia on chart review, now moderate on presentation  Found to be in ADHF on arrival  BNP 2600, volume overloaded, JVD present, bilateral LE edema  CXR with mild pulmonary congestion  Has since had adequate urine output on Lasix 80mg IV BID, with some improvement of Na  Reports poor diet at home (crackers/peanutbutter)  Reduced albumin likely 2/2 to malnutrition    Plan  --transition lasix Iv to PO 80mg BID  --strict I/Os  --1000cc fluid restriction  --f/u urine osm/serum osm/urine Na      VTE Risk Mitigation (From admission, onward)         Ordered     enoxaparin injection 40 mg  Daily         01/31/23 1202     IP VTE HIGH RISK PATIENT  Once         01/31/23 1202     Place sequential compression device  Until discontinued         01/31/23 1202                    Thank you for your consult. I will follow-up with patient. Please contact us if you have any additional questions.    Fahad Smith MD  Department of Hospital Medicine   [facility]

## 2023-02-03 NOTE — ASSESSMENT & PLAN NOTE
Acute on chronic combined systolic and diastolic heart failure  Recent echo showed EF of 10%  Patient appears volume overloaded on examination  Currently on Toprol-XL 25, losartan 25 mg.  CXR without new detrimental changes since prior, BNP 2600     Plan  - Patient appears euvolemic on exam recommend transition to oral 80mg bid.    - Replete Mag>2, K>4  - Continue Toprol-XL 12.5

## 2023-02-03 NOTE — ASSESSMENT & PLAN NOTE
Cardiology consulted for management of ADHF, now signed off  Recent echo showed EF of 10%  Patient hypervolemic on presentation, appears more euvolemic today  Home GDMT: Toprol-XL 25, losartan 25 mg.  Initial CXR without new detrimental changes since prior, BNP 2600 on arrival     Started on IV lasix, now transitioning to PO lasix 80mg BID  Adequate urine output on IV lasix, 2300cc     Plan  --Agree with cardiology recommendation to transition IV lasix to oral 80mg bid.   --Replete Mag>2, K>4  --Continue Toprol-XL 12.5 per cardiology recommendations  --continue losartan 25mg daily

## 2023-02-03 NOTE — SUBJECTIVE & OBJECTIVE
Interval History: Patient without new complaints. HDS, AF. She remains chest pain free, dyspnea free. Has been able to walk up and down the halls without issues. Only complaint is urinating frequently at night due to lasix night time dose.     Review of Systems   Constitutional: Negative for chills and fever.   HENT:  Negative for congestion and sore throat.    Eyes:  Negative for blurred vision and double vision.   Cardiovascular:  Negative for chest pain and palpitations.   Respiratory:  Negative for cough and sputum production.    Endocrine: Negative for polydipsia and polyphagia.   Musculoskeletal:  Negative for back pain and myalgias.   Gastrointestinal:  Negative for bloating, diarrhea, nausea and vomiting.   Genitourinary:  Negative for flank pain, frequency and hematuria.   Neurological:  Negative for numbness and weakness.   Psychiatric/Behavioral:  Negative for altered mental status. The patient is not nervous/anxious.    Objective:     Vital Signs (Most Recent):  Temp: 97.9 °F (36.6 °C) (02/03/23 1101)  Pulse: 86 (02/03/23 1101)  Resp: 18 (02/03/23 1101)  BP: (!) 107/58 (02/03/23 1101)  SpO2: 98 % (02/03/23 1101)   Vital Signs (24h Range):  Temp:  [97.9 °F (36.6 °C)-99.6 °F (37.6 °C)] 97.9 °F (36.6 °C)  Pulse:  [82-93] 86  Resp:  [16-18] 18  SpO2:  [95 %-99 %] 98 %  BP: (102-125)/(55-60) 107/58     Weight: 56.2 kg (124 lb)  Body mass index is 21.97 kg/m².     SpO2: 98 %         Intake/Output Summary (Last 24 hours) at 2/3/2023 1302  Last data filed at 2/2/2023 2319  Gross per 24 hour   Intake --   Output 2300 ml   Net -2300 ml       Lines/Drains/Airways       Drain  Duration                  Colostomy 05/09/22 1100 Descending/sigmoid  days              Peripheral Intravenous Line  Duration                  Peripheral IV - Single Lumen 02/01/23 1530 20 G;1 3/4 in Left Forearm 1 day                    Physical Exam  HENT:      Head: Normocephalic.      Nose: Nose normal.      Mouth/Throat:       Mouth: Mucous membranes are moist.   Eyes:      Pupils: Pupils are equal, round, and reactive to light.   Cardiovascular:      Rate and Rhythm: Normal rate.      Comments: JVD to clavicle   Pulmonary:      Effort: Pulmonary effort is normal.   Abdominal:      General: Abdomen is flat.   Musculoskeletal:         General: Normal range of motion.      Cervical back: Normal range of motion.   Skin:     General: Skin is warm.      Capillary Refill: Capillary refill takes less than 2 seconds.   Neurological:      General: No focal deficit present.      Mental Status: She is alert and oriented to person, place, and time.   Psychiatric:         Mood and Affect: Mood normal.       Significant Labs: All pertinent lab results from the last 24 hours have been reviewed.    Significant Imaging:  No new imaging

## 2023-02-04 NOTE — PROGRESS NOTES
[facility]  Vascular Surgery  Progress Note    Patient Name: Odette Hart  MRN: [id number]  Admission Date: 1/31/2023  Primary Care Provider: Braxton Barragan MD    Subjective:     Interval History: 2/4: Will order bowel prep for tomorrow in anticipation of OR Monday. Wound care came by yesterday. Patient is ambulating, endorses nausea, counseled on eating slowly. Repleted K+.     Post-Op Info:  * No surgery found *           Medications:  Continuous Infusions:  Scheduled Meds:   aspirin  81 mg Oral Daily    balsam peru-castor oiL   Topical (Top) BID    enoxaparin  40 mg Subcutaneous Daily    ezetimibe  10 mg Oral Daily    famotidine  20 mg Oral Daily    furosemide  80 mg Oral BID    losartan  25 mg Oral Daily    metoprolol succinate  12.5 mg Oral Daily    polyethylene glycol  17 g Oral BID    potassium bicarbonate  40 mEq Oral Q4H     PRN Meds:acetaminophen, albuterol, ALPRAZolam, dextrose 10%, dextrose 10%, fluticasone propionate, naloxone, nitroGLYCERIN, ondansetron, oxyCODONE, prochlorperazine, sodium chloride 0.9%     Objective:     Vital Signs (Most Recent):  Temp: 100 °F (37.8 °C) (02/04/23 0744)  Pulse: 103 (02/04/23 0744)  Resp: 16 (02/04/23 0818)  BP: (!) 98/54 (02/04/23 0744)  SpO2: (!) 94 % (02/04/23 0744)   Vital Signs (24h Range):  Temp:  [97.8 °F (36.6 °C)-100 °F (37.8 °C)] 100 °F (37.8 °C)  Pulse:  [] 103  Resp:  [16-20] 16  SpO2:  [94 %-98 %] 94 %  BP: ()/(51-66) 98/54         Physical Exam  HENT:      Head: Normocephalic.      Mouth/Throat:      Mouth: Mucous membranes are moist.   Eyes:      Pupils: Pupils are equal, round, and reactive to light.   Cardiovascular:      Rate and Rhythm: Normal rate.      Comments: JVD around clavicle  Pulmonary:      Effort: Pulmonary effort is normal.   Abdominal:      Comments: Soft, mild TTP around colocutaneous fistula,  clean, intact.   Musculoskeletal:         General: Normal range of motion.      Cervical back: Normal range of  motion.   Skin:     General: Skin is warm.      Capillary Refill: Capillary refill takes less than 2 seconds.   Neurological:      General: No focal deficit present.      Mental Status: She is alert and oriented to person, place, and time.   Psychiatric:         Mood and Affect: Mood normal  Significant Labs:  All pertinent labs from the last 24 hours have been reviewed.    Significant Diagnostics:  I have reviewed and interpreted all pertinent imaging results/findings within the past 24 hours.  No results found in the last 24 hours.      Assessment/Plan:     Colocutaneous fistula   67 y.o. female well known to the CRS service with history of HTN, CAD s/p PCI c/b HFrEF (EF 10%), MDD, debility, severe malnutrition, and open cholecystectomy and takedown of colovesical and colovaginal fistuli (two separate areas) with sigmoid colectomy, partial cecectomy and appendectomy, drainage of intra-abdominal abscess and end colostomy 5/9/2022 recently admitted 12/22/22 - 12/30/22 for new colocutaneous fistula who presents with incarcerated colonic prolapse from her fistula     - h/o CAD/MI and HFrEF, following cards recommendations   - Continue home HFrEF medications, 1L fluid restriction.   - Continue regular diet, begin Miralax BID bowel regimen  - Continue local prolapse care with stoma bag and abdominal binder  - Serial abdominal exams in setting of mucosal ischemia prior to prolapse reduction  - Tentatively planning for OR Monday, bowel prep tomorrow    - Dispo: ongoing, COURTNEY Gleason MD  Vascular Surgery  [facility]

## 2023-02-04 NOTE — SUBJECTIVE & OBJECTIVE
Medications:  Continuous Infusions:  Scheduled Meds:   aspirin  81 mg Oral Daily    balsam peru-castor oiL   Topical (Top) BID    enoxaparin  40 mg Subcutaneous Daily    ezetimibe  10 mg Oral Daily    famotidine  20 mg Oral Daily    furosemide  80 mg Oral BID    losartan  25 mg Oral Daily    metoprolol succinate  12.5 mg Oral Daily    polyethylene glycol  17 g Oral BID    potassium bicarbonate  40 mEq Oral Q4H     PRN Meds:acetaminophen, albuterol, ALPRAZolam, dextrose 10%, dextrose 10%, fluticasone propionate, naloxone, nitroGLYCERIN, ondansetron, oxyCODONE, prochlorperazine, sodium chloride 0.9%     Objective:     Vital Signs (Most Recent):  Temp: 100 °F (37.8 °C) (02/04/23 0744)  Pulse: 103 (02/04/23 0744)  Resp: 16 (02/04/23 0818)  BP: (!) 98/54 (02/04/23 0744)  SpO2: (!) 94 % (02/04/23 0744)   Vital Signs (24h Range):  Temp:  [97.8 °F (36.6 °C)-100 °F (37.8 °C)] 100 °F (37.8 °C)  Pulse:  [] 103  Resp:  [16-20] 16  SpO2:  [94 %-98 %] 94 %  BP: ()/(51-66) 98/54         Physical Exam  HENT:      Head: Normocephalic.      Mouth/Throat:      Mouth: Mucous membranes are moist.   Eyes:      Pupils: Pupils are equal, round, and reactive to light.   Cardiovascular:      Rate and Rhythm: Normal rate.      Comments: JVD around clavicle  Pulmonary:      Effort: Pulmonary effort is normal.   Abdominal:      Comments: Soft, mild TTP around colocutaneous fistula,  clean, intact.   Musculoskeletal:         General: Normal range of motion.      Cervical back: Normal range of motion.   Skin:     General: Skin is warm.      Capillary Refill: Capillary refill takes less than 2 seconds.   Neurological:      General: No focal deficit present.      Mental Status: She is alert and oriented to person, place, and time.   Psychiatric:         Mood and Affect: Mood normal  Significant Labs:  All pertinent labs from the last 24 hours have been reviewed.    Significant Diagnostics:  I have reviewed and interpreted all  pertinent imaging results/findings within the past 24 hours.  No results found in the last 24 hours.

## 2023-02-04 NOTE — ASSESSMENT & PLAN NOTE
67 y.o. female well known to the CRS service with history of HTN, CAD s/p PCI c/b HFrEF (EF 10%), MDD, debility, severe malnutrition, and open cholecystectomy and takedown of colovesical and colovaginal fistuli (two separate areas) with sigmoid colectomy, partial cecectomy and appendectomy, drainage of intra-abdominal abscess and end colostomy 5/9/2022 recently admitted 12/22/22 - 12/30/22 for new colocutaneous fistula who presents with incarcerated colonic prolapse from her fistula     - h/o CAD/MI and HFrEF, following cards recommendations   - Continue home HFrEF medications, 1L fluid restriction.   - Continue regular diet, begin Miralax BID bowel regimen  - Continue local prolapse care with stoma bag and abdominal binder  - Serial abdominal exams in setting of mucosal ischemia prior to prolapse reduction  - Tentatively planning for OR Monday, bowel prep tomorrow    - Dispo: ongoing, [facility]

## 2023-02-04 NOTE — ASSESSMENT & PLAN NOTE
History of anterior wall MI with LAD stent in 2012 and circumflex stent in January 2022.  Continue ASA, Lipitor  Ezetimibe  Plavix on hold for surgery  Restart ASA as soon as possible if held for surgery

## 2023-02-04 NOTE — SUBJECTIVE & OBJECTIVE
Interval History: NAEO, blood pressure remains soft, MAPs maintained >65, will continue toprol and losartan, Patient appears euvolemic on exam, 2.4 L UOP. Urine/serum studies consistent with hypervolemic hyponatremia, continue diuresis with PO lasix 80mg BID.    Review of Systems   Constitutional:  Negative for chills and fever.   HENT:  Negative for congestion, rhinorrhea and sore throat.    Respiratory:  Negative for cough, shortness of breath and wheezing.    Cardiovascular:  Positive for leg swelling. Negative for chest pain and palpitations.   Gastrointestinal:  Positive for nausea. Negative for abdominal pain and vomiting.   Endocrine: Negative for polydipsia.   Genitourinary:  Negative for difficulty urinating, dysuria and hematuria.   Skin:  Positive for wound (colosotomy with colocutaneous fistula).   Neurological:  Negative for dizziness, syncope, weakness, light-headedness and headaches.   Psychiatric/Behavioral:  Negative for agitation and confusion.    Objective:     Vital Signs (Most Recent):  Temp: 100 °F (37.8 °C) (02/04/23 0744)  Pulse: 103 (02/04/23 0744)  Resp: 16 (02/04/23 0818)  BP: (!) 98/54 (02/04/23 0744)  SpO2: (!) 94 % (02/04/23 0744)   Vital Signs (24h Range):  Temp:  [97.8 °F (36.6 °C)-100 °F (37.8 °C)] 100 °F (37.8 °C)  Pulse:  [] 103  Resp:  [16-20] 16  SpO2:  [94 %-98 %] 94 %  BP: ()/(51-66) 98/54     Weight: 56.2 kg (124 lb)  Body mass index is 21.97 kg/m².    Intake/Output Summary (Last 24 hours) at 2/4/2023 0916  Last data filed at 2/4/2023 0638  Gross per 24 hour   Intake 50 ml   Output 2400 ml   Net -2350 ml      Physical Exam  Vitals and nursing note reviewed.   Constitutional:       General: She is not in acute distress.     Appearance: She is ill-appearing.   HENT:      Head: Normocephalic and atraumatic.      Right Ear: External ear normal.      Left Ear: External ear normal.      Nose: No congestion or rhinorrhea.   Eyes:      General:         Right eye: No  discharge.         Left eye: No discharge.      Extraocular Movements: Extraocular movements intact.      Pupils: Pupils are equal, round, and reactive to light.   Cardiovascular:      Rate and Rhythm: Normal rate and regular rhythm.      Heart sounds: Normal heart sounds. No murmur heard.  Pulmonary:      Effort: No respiratory distress.      Breath sounds: No wheezing.   Abdominal:      General: There is no distension.      Comments: Colostomy with colocutaneous fistula, colostomy bag in place with normal appearing liquid contents   Musculoskeletal:         General: No tenderness.      Cervical back: Normal range of motion. No rigidity.      Right lower leg: No edema.      Left lower leg: No edema.   Skin:     Findings: Bruising present. No erythema.   Neurological:      General: No focal deficit present.      Mental Status: She is alert and oriented to person, place, and time.   Psychiatric:         Mood and Affect: Mood normal.         Behavior: Behavior normal.       Significant Labs: All pertinent labs within the past 24 hours have been reviewed.  BMP:   Recent Labs   Lab 02/04/23  0219   *   *   K 3.5   CL 83*   CO2 32*   BUN 14   CREATININE 0.7   CALCIUM 8.0*   MG 1.9       Significant Imaging: I have reviewed all pertinent imaging results/findings within the past 24 hours.

## 2023-02-04 NOTE — ASSESSMENT & PLAN NOTE
Chronic mild hyponatremia on chart review, now moderate on presentation  Found to be in ADHF on arrival  BNP 2600, volume overloaded, JVD present, bilateral LE edema  CXR with mild pulmonary congestion  Has since had adequate urine output on Lasix 80mg IV BID, with some improvement of Na  Reports poor diet at home (crackers/peanutbutter)  Reduced albumin likely 2/2 to malnutrition    Urine/serum studies consistent with hypervolemic hyponatremia    Plan  -- continue PO Lasix 80mg BID  --strict I/Os  --1000cc fluid restriction

## 2023-02-04 NOTE — PROGRESS NOTES
[facility]  Cardiology  Progress Note    Patient Name: Odette Hart  MRN: [id number]  Admission Date: 1/31/2023  Hospital Length of Stay: 4 days  Code Status: Full Code   Attending Physician: Rafa Cardozo MD   Primary Care Physician: Braxton Barragan MD  Expected Discharge Date: 2/7/2023  Principal Problem:Ischemic cardiomyopathy    Subjective:     Hospital Course:   No notes on file    Interval History: HDS, AF. Without acute events overnight. She has no complaints. Able to lie flat, no LE swelling, denies chest pain and palpitations. Denies increased output from ostomy.     Review of Systems   Constitutional: Negative for chills and fever.   HENT:  Negative for congestion and sore throat.    Eyes:  Negative for blurred vision and double vision.   Cardiovascular:  Negative for chest pain and palpitations.   Respiratory:  Negative for cough and sputum production.    Endocrine: Negative for polydipsia and polyphagia.   Musculoskeletal:  Negative for back pain and myalgias.   Gastrointestinal:  Negative for bloating, diarrhea, nausea and vomiting.   Genitourinary:  Negative for flank pain, frequency and hematuria.   Neurological:  Negative for numbness and weakness.   Psychiatric/Behavioral:  Negative for altered mental status. The patient is not nervous/anxious.    Objective:     Vital Signs (Most Recent):  Temp: 100 °F (37.8 °C) (02/04/23 0744)  Pulse: 103 (02/04/23 0744)  Resp: 16 (02/04/23 0818)  BP: (!) 98/54 (02/04/23 0744)  SpO2: (!) 94 % (02/04/23 0744)   Vital Signs (24h Range):  Temp:  [97.8 °F (36.6 °C)-100 °F (37.8 °C)] 100 °F (37.8 °C)  Pulse:  [] 103  Resp:  [16-20] 16  SpO2:  [94 %-98 %] 94 %  BP: ()/(51-66) 98/54     Weight: 56.2 kg (124 lb)  Body mass index is 21.97 kg/m².     SpO2: (!) 94 %         Intake/Output Summary (Last 24 hours) at 2/4/2023 0938  Last data filed at 2/4/2023 0638  Gross per 24 hour   Intake 50 ml   Output 2400 ml   Net -2350 ml        Lines/Drains/Airways       Drain  Duration                  Colostomy 05/09/22 1100 Descending/sigmoid  days              Peripheral Intravenous Line  Duration                  Peripheral IV - Single Lumen 02/03/23 1604 18 G;1 3/4 in Left;Anterior Upper Arm <1 day                    Physical Exam  HENT:      Head: Normocephalic.      Nose: Nose normal.      Mouth/Throat:      Mouth: Mucous membranes are moist.   Eyes:      Pupils: Pupils are equal, round, and reactive to light.   Neck:      Comments: Jvd around clavicle  Cardiovascular:      Rate and Rhythm: Normal rate.   Pulmonary:      Effort: Pulmonary effort is normal.   Abdominal:      General: Abdomen is flat. There is no distension.   Musculoskeletal:      Right lower leg: No edema.      Left lower leg: No edema.   Skin:     General: Skin is warm.   Neurological:      General: No focal deficit present.      Mental Status: She is alert and oriented to person, place, and time. Mental status is at baseline.   Psychiatric:         Mood and Affect: Mood normal.       Significant Labs: All pertinent lab results from the last 24 hours have been reviewed.    Significant Imaging:  No new imaging    Assessment and Plan:       * Ischemic cardiomyopathy  Acute on chronic combined systolic and diastolic heart failure  Recent echo showed EF of 10%  Patient appears volume overloaded on examination  Currently on Toprol-XL 25, losartan 25 mg.  CXR without new detrimental changes since prior, BNP 2600     Plan  - Patient appears euvolemic on exam recommend transition to oral 80mg bid.    - Replete Mag>2, K>4  - Continue Toprol-XL 12.5      Hyponatremia  Patient with chronic hyponatremia, poor prognostic indicator in the setting of heart failure. Initially improving with diuresis, however still 124 despite euvolemia on exam. On chart review, patient was previously given Tolvaptan last year with improvement and was supposed to be on the medication outpatient,  however cost prohibited from taking.   - recommend nephrology consult to consider addition of one time dose of tolvaptan 15mg  - if started, will need close monitoring of Na to avoid overcorrection    Coronary artery disease  History of anterior wall MI with LAD stent in 2012 and circumflex stent in January 2022.  Continue ASA, Lipitor  Ezetimibe  Plavix on hold for surgery  Restart ASA as soon as possible if held for surgery          VTE Risk Mitigation (From admission, onward)         Ordered     enoxaparin injection 40 mg  Daily         01/31/23 1202     IP VTE HIGH RISK PATIENT  Once         01/31/23 1202     Place sequential compression device  Until discontinued         01/31/23 1202                Regi Tapia,   Cardiology  [facility]

## 2023-02-04 NOTE — PROGRESS NOTES
[facility] Medicine  Progress Note    Patient Name: Odette Hart  MRN: [id number]  Patient Class: IP- Inpatient   Admission Date: 1/31/2023  Length of Stay: 4 days  Attending Physician: Rafa Cardozo MD  Primary Care Provider: Braxton Barragan MD        Subjective:     Principal Problem:Ischemic cardiomyopathy        HPI:  66 yo F with HTN, HLD (statin intolerance), DM, CAD s/p PCI to LAD (2012)/LCX (1/2022), HFrEF (EF10%), debility, severe malnutrition, and the following procedures performed 5/2022: cholecystectomy, takedown of colovesical and colovaginal fistula, sigmoid colectomy, partial cecectomy, end colostomy who presents for colocutaneous fistula repair with abdominal wall reconstruction. On admission she was found to be hyponatremic and in acute on chronic decompensated heart failure. Cardiology was consulted to manage her HF and have since signed off as her volume status has become more euvolemic following diuresis. Medicine was consulted for pr-eop evaluation and continued management of her hyponatremia. Per chart review patient is chronically, mildly hyponatremic at baseline. She takes Lasix, Toprol, spironolactone, and losartan for her HFrEF. She reports correct use of her home medications. She states her diet consists of peanut butter and crackers. Denies CP/SOB/nausea vomiting, cough fever chills, polydipsia. Her Na has been improving with diuresis. Patient has multiple risk factors that complicate surgery: acute on chronic decompensated HF, CAD with PCIx2, hypertension, hyperlipidemia, malnutrition. This being said she does not have reported/documented/observed dysfunction of liver, kidney, lung, clotting.    On examination, she is chronically ill appearing/in no acute distress/at mental baseline. AF, /58 (78), sat 98% on RA, no leukocytosis, hgb 11.8, Hct 38.5, Plts 237, moderate Hyponatremia Na 124 (initially 122 on admission) with labile Baseline, K 3.1, Cl 85, Bun 13,  Cr 00.8, Ca 7.9, albumin 2-3. BNP 2600, UOP 2300 on lasix 80mg    CXR with mild pulmonary congestion      Overview/Hospital Course:  Admitted for planned colocutaneous fistula repair (previously complicated by sever sepsis), found to be hyponatremic and in ADHF. Started on IV diuresis, >6 L UOP, returned to euvolemia. Urine/serum studies consistent with hypervolemic hyponatremia most likely in 2/2 to acute on chronic HF. She does have multiple factors that maker her high risk for surgery, primarily her severrely reduced LVEF 10%. Cardiology and medicine consulted for pre-operative evaluation. Agree she is high risk, but need of her procedure outweigh the risks.      Interval History: NAEO, blood pressure remains soft, MAPs maintained >65, will continue toprol and losartan, Patient appears euvolemic on exam, 2.4 L UOP. Urine/serum studies consistent with hypervolemic hyponatremia, continue diuresis with PO lasix 80mg BID.    Review of Systems   Constitutional:  Negative for chills and fever.   HENT:  Negative for congestion, rhinorrhea and sore throat.    Respiratory:  Negative for cough, shortness of breath and wheezing.    Cardiovascular:  Positive for leg swelling. Negative for chest pain and palpitations.   Gastrointestinal:  Positive for nausea. Negative for abdominal pain and vomiting.   Endocrine: Negative for polydipsia.   Genitourinary:  Negative for difficulty urinating, dysuria and hematuria.   Skin:  Positive for wound (colosotomy with colocutaneous fistula).   Neurological:  Negative for dizziness, syncope, weakness, light-headedness and headaches.   Psychiatric/Behavioral:  Negative for agitation and confusion.    Objective:     Vital Signs (Most Recent):  Temp: 100 °F (37.8 °C) (02/04/23 0744)  Pulse: 103 (02/04/23 0744)  Resp: 16 (02/04/23 0818)  BP: (!) 98/54 (02/04/23 0744)  SpO2: (!) 94 % (02/04/23 0744)   Vital Signs (24h Range):  Temp:  [97.8 °F (36.6 °C)-100 °F (37.8 °C)] 100 °F (37.8  °C)  Pulse:  [] 103  Resp:  [16-20] 16  SpO2:  [94 %-98 %] 94 %  BP: ()/(51-66) 98/54     Weight: 56.2 kg (124 lb)  Body mass index is 21.97 kg/m².    Intake/Output Summary (Last 24 hours) at 2/4/2023 0922  Last data filed at 2/4/2023 0638  Gross per 24 hour   Intake 50 ml   Output 2400 ml   Net -2350 ml      Physical Exam  Vitals and nursing note reviewed.   Constitutional:       General: She is not in acute distress.     Appearance: She is ill-appearing.   HENT:      Head: Normocephalic and atraumatic.      Right Ear: External ear normal.      Left Ear: External ear normal.      Nose: No congestion or rhinorrhea.   Eyes:      General:         Right eye: No discharge.         Left eye: No discharge.      Extraocular Movements: Extraocular movements intact.      Pupils: Pupils are equal, round, and reactive to light.   Cardiovascular:      Rate and Rhythm: Normal rate and regular rhythm.      Heart sounds: Normal heart sounds. No murmur heard.  Pulmonary:      Effort: No respiratory distress.      Breath sounds: No wheezing.   Abdominal:      General: There is no distension.      Comments: Colostomy with colocutaneous fistula, colostomy bag in place with normal appearing liquid contents   Musculoskeletal:         General: No tenderness.      Cervical back: Normal range of motion. No rigidity.      Right lower leg: No edema.      Left lower leg: No edema.   Skin:     Findings: Bruising present. No erythema.   Neurological:      General: No focal deficit present.      Mental Status: She is alert and oriented to person, place, and time.   Psychiatric:         Mood and Affect: Mood normal.         Behavior: Behavior normal.       Significant Labs: All pertinent labs within the past 24 hours have been reviewed.  BMP:   Recent Labs   Lab 02/04/23  0219   *   *   K 3.5   CL 83*   CO2 32*   BUN 14   CREATININE 0.7   CALCIUM 8.0*   MG 1.9       Significant Imaging: I have reviewed all pertinent  imaging results/findings within the past 24 hours.      Assessment/Plan:      * Ischemic cardiomyopathy  Cardiology consulted for management of ADHF, now signed off  Recent echo showed EF of 10%  Patient hypervolemic on presentation, appears more euvolemic today  Home GDMT: Toprol-XL 25, losartan 25 mg.  Initial CXR without new detrimental changes since prior, BNP 2600 on arrival     Adequate urine output yesterday, 2.4 L     Plan  --continue Po lasix 80 mg BID  --Replete Mag>2, K>4  --Continue Toprol-XL 12.5 per cardiology recommendations  --continue losartan 25mg daily    Preop examination  Patient has multiple risk factors that complicate surgery: acute on chronic decompensated HF, CAD with PCIx2, hypertension, hyperlipidemia, malnutrition. This being said she does not have reported/documented/observed dysfunction of liver, kidney, lung, clotting. From medicine standpoint, patients need for procedure currently outweigh her risks. Will defer to cardiology for cardiac risk assessment    Colocutaneous fistula  Managed by CRS    Coronary artery disease  History of Cad with PCIx2 LAD in 2012 and LCX 1/2022  Home regimen: DAPT    -continue aspirin  -holding clopidogrel for pending procedure    Hypertension  Pressures are well maintained  Continue Losartan 25mg daily and Metoprolol 12.5 mg daily    Hold losartan prior to sugery    Hypercholesterolemia  Lipids well controlled  Has documented statin intolerance  Continue ezetimibe    Hyponatremia  Chronic mild hyponatremia on chart review, now moderate on presentation  Found to be in ADHF on arrival  BNP 2600, volume overloaded, JVD present, bilateral LE edema  CXR with mild pulmonary congestion  Has since had adequate urine output on Lasix 80mg IV BID, with some improvement of Na  Reports poor diet at home (crackers/peanutbutter)  Reduced albumin likely 2/2 to malnutrition    Urine/serum studies consistent with hypervolemic hyponatremia    Plan  -- continue PO Lasix 80mg  BID  --strict I/Os  --1000cc fluid restriction    VTE Risk Mitigation (From admission, onward)         Ordered     enoxaparin injection 40 mg  Daily         01/31/23 1202     IP VTE HIGH RISK PATIENT  Once         01/31/23 1202     Place sequential compression device  Until discontinued         01/31/23 1202                Discharge Planning   SIVAN: 2/7/2023     Code Status: Full Code   Is the patient medically ready for discharge?:     Reason for patient still in hospital (select all that apply): Treatment  Discharge Plan A: Home Health          Fahad Smith MD  Department of Hospital Medicine   [facility]

## 2023-02-04 NOTE — ASSESSMENT & PLAN NOTE
Pressures are well maintained  Continue Losartan 25mg daily and Metoprolol 12.5 mg daily    Hold losartan prior to sugery

## 2023-02-04 NOTE — HOSPITAL COURSE
2/4: Will order bowel prep for tomorrow. Wound care came by yesterday. Patient is ambulating, endorses nausea, counseled on eating slowly. Repleted K+.

## 2023-02-04 NOTE — HOSPITAL COURSE
Admitted for planned colocutaneous fistula repair, found to be hyponatremic and in ADHF. Started on IV diuresis, >6 L UOP, returned to euvolemia. Urine/serum studies consistent with hypervolemic hyponatremia most likely in 2/2 to acute on chronic HF. She does have multiple factors that maker her high risk for surgery, primarily her severrely reduced LVEF 10%. Cardiology and medicine consulted for pre-operative evaluation. Agree she is high risk, but need of her procedure outweigh the risks. 2/6 Patient tolerated colocutaneous takedown/reconstruction well. Her Hyponatremia has resolved with Lasix 80mg BID. S/p colocutaneous fistula revision with intra-op R ureteral stent placement for hydronephrosis. Developed post-op ileus. Given lasix holiday for over diuresis. Plavix, ASA restarted post-op. Anticipate discharge on prior home PO lasix dosing.     Complaint of dysuria and dark cloudy urine noted by primary team, patient started on cipro then transitioned to cefpodoxime (broader coverage) given culture results of multiple organisms none in predominance (thus an unequivocal UCx).  Recently discontinued from PCA pump with PRN pain management of oxy 5 and 10 mg. Restarted lasix 20 mg on 2/14/23. CRS monitoring surgical site closely, some output of serous fluid, no signs of cellulitis. Continued symptomatic hypotension despite discontinuing GDMT. CTS and HTS both evaluated patient and deemed there are likely no advanced options given debility and age. Recommended palliative care consultation which was relayed to primary team.    Patient with new onset hematuria and reported dysuira. Call to lab concerning UA completed which demonstrated WBC clumps and no WBC was addend to show just hematuria. Urology was contacted given recent stent placement and plan to evaluate patient. Repeat CT ordered by CRS which demonstrated likely fluid collection for which CRS plans to manage with continued PO Moxifloxacin. Will monitor for  tolerance

## 2023-02-04 NOTE — ASSESSMENT & PLAN NOTE
Cardiology consulted for management of ADHF, now signed off  Recent echo showed EF of 10%  Patient hypervolemic on presentation, appears more euvolemic today  Home GDMT: Toprol-XL 25, losartan 25 mg.  Initial CXR without new detrimental changes since prior, BNP 2600 on arrival     Adequate urine output yesterday, 2.4 L     Plan  --continue Po lasix 80 mg BID  --Replete Mag>2, K>4  --Continue Toprol-XL 12.5 per cardiology recommendations  --continue losartan 25mg daily

## 2023-02-04 NOTE — ASSESSMENT & PLAN NOTE
Patient with chronic hyponatremia, poor prognostic indicator in the setting of heart failure. Initially improving with diuresis, however still 124 despite euvolemia on exam. On chart review, patient was previously given Tolvaptan last year with improvement and was supposed to be on the medication outpatient, however cost prohibited from taking.   - recommend nephrology consult to consider addition of one time dose of tolvaptan 15mg  - if started, will need close monitoring of Na to avoid overcorrection

## 2023-02-04 NOTE — SUBJECTIVE & OBJECTIVE
Interval History: HDS, AF. Without acute events overnight. She has no complaints. Able to lie flat, no LE swelling, denies chest pain and palpitations. Denies increased output from ostomy.     Review of Systems   Constitutional: Negative for chills and fever.   HENT:  Negative for congestion and sore throat.    Eyes:  Negative for blurred vision and double vision.   Cardiovascular:  Negative for chest pain and palpitations.   Respiratory:  Negative for cough and sputum production.    Endocrine: Negative for polydipsia and polyphagia.   Musculoskeletal:  Negative for back pain and myalgias.   Gastrointestinal:  Negative for bloating, diarrhea, nausea and vomiting.   Genitourinary:  Negative for flank pain, frequency and hematuria.   Neurological:  Negative for numbness and weakness.   Psychiatric/Behavioral:  Negative for altered mental status. The patient is not nervous/anxious.    Objective:     Vital Signs (Most Recent):  Temp: 100 °F (37.8 °C) (02/04/23 0744)  Pulse: 103 (02/04/23 0744)  Resp: 16 (02/04/23 0818)  BP: (!) 98/54 (02/04/23 0744)  SpO2: (!) 94 % (02/04/23 0744)   Vital Signs (24h Range):  Temp:  [97.8 °F (36.6 °C)-100 °F (37.8 °C)] 100 °F (37.8 °C)  Pulse:  [] 103  Resp:  [16-20] 16  SpO2:  [94 %-98 %] 94 %  BP: ()/(51-66) 98/54     Weight: 56.2 kg (124 lb)  Body mass index is 21.97 kg/m².     SpO2: (!) 94 %         Intake/Output Summary (Last 24 hours) at 2/4/2023 0938  Last data filed at 2/4/2023 0638  Gross per 24 hour   Intake 50 ml   Output 2400 ml   Net -2350 ml       Lines/Drains/Airways       Drain  Duration                  Colostomy 05/09/22 1100 Descending/sigmoid  days              Peripheral Intravenous Line  Duration                  Peripheral IV - Single Lumen 02/03/23 1604 18 G;1 3/4 in Left;Anterior Upper Arm <1 day                    Physical Exam  HENT:      Head: Normocephalic.      Nose: Nose normal.      Mouth/Throat:      Mouth: Mucous membranes are moist.    Eyes:      Pupils: Pupils are equal, round, and reactive to light.   Neck:      Comments: Jvd around clavicle  Cardiovascular:      Rate and Rhythm: Normal rate.   Pulmonary:      Effort: Pulmonary effort is normal.   Abdominal:      General: Abdomen is flat. There is no distension.   Musculoskeletal:      Right lower leg: No edema.      Left lower leg: No edema.   Skin:     General: Skin is warm.   Neurological:      General: No focal deficit present.      Mental Status: She is alert and oriented to person, place, and time. Mental status is at baseline.   Psychiatric:         Mood and Affect: Mood normal.       Significant Labs: All pertinent lab results from the last 24 hours have been reviewed.    Significant Imaging:  No new imaging

## 2023-02-05 NOTE — CONSULTS
"[facility]  Nephrology  Consult Note    Patient Name: Odette Hart  MRN: [id number]  Admission Date: 1/31/2023  Hospital Length of Stay: 4 days  Attending Provider: Rafa Cardozo MD   Primary Care Physician: Braxton Barragan MD  Principal Problem:Ischemic cardiomyopathy    Inpatient consult to Nephrology  Consult performed by: Nikki Rivers MD  Consult ordered by: Pablo Ramirez MD  Reason for consult: "Chronic hyponatremia in setting of HFrEF despite euvolemia, consult for possible Tolvaptan"  Assessment/Recommendations: Initial recs discussed with primary team at around 1930  Please see consult note and staff attestation for full recommendations. Thank you!         Subjective:     HPI:   Odette Hart is a 67 y.o. female w/ known HTN, Hyperlipidemia, HFrEF (10%), s/p ICD placement (2014), MI s/ stent to LAD (2012), [facility] HEVER x2 in LCx (1/2022), RBB, colovesicular fistula with chronic thickenned GB s/p choecystectomy, sigmoid colectomy and end colostomy (5/2022) complicated by new colocutaneous fistula and incarcerated colonic prolase (1/2023), and anemia admitted on 1/31/2023 for evaluation and management of worsening colocutaneous fistula that was no longer able to be reduced with dusky discoloration and pt experiencing nausea and vomiting.     Pt states that, she has been following with her cardiologist closely and that he had started her on lasix 80mg BID for her heart failure however she self-adjusted the dose to 40mg BID and then to only 40mg daily d/t feeling very thirsty and "dehydrated", for the past 3 months, she has been taking only 40mg of lasix daily. In addition, pt reports not having very good appetite and difficulty with eating d/t not having her posterior teeth, she can chew meat and was on Boost protein shakes, crackers, peanut butter and jelly sandwiches, , 1 Sprite soda per day. She recalls having hyponatremia and was started on tolvaptan in the hosptial however she didn't take " "tolvaptan when discharged d/t pt not being able to afford tolvaptan $5,000/month supply, her Na corrected only on diuretics.       In the ED, pt presented with the following VS:   Initial Vitals [01/31/23 1512]   BP Pulse Resp Temp SpO2   116/62 75 20 97.2 °F (36.2 °C) (!) 94 %      MAP       --         Initial labs were notable for: sNa 122, K 4.4, bicarb 24, BUN 25, sCr 0.9, alk phos 200, albumin 2.0, t. Tan 2, AST/ALT 51/46, BNP 2604. Hematology labs showed WBC 3.78, hgb 11.8, plts 237  A CXR showed stable pulmonary markings, no pleural effusions, and known cardiomegaly   On 2/1 she received metoprolol 25mg-->12.5mg BID since 2/2,   2/2 Phos 2.4-->1.6-->3 on 2/4 with replacement  Pt has also received Mg & K replacements     Nephrology was consulted for: "Chronic hyponatremia in setting of HFrEF despite euvolemia, consult for possible Tolvaptan"  Lasix 80mg IV Q12H (2/1, 2/2), 80mg PO x1 on 2/3, 80mg PO on 2/4  Losartan 25mg on 2/3  2/3 sOsm: 274  2/3 Urine labs: Osm 460, Na 16  In the last 24 hrs her I/Os are: 50ml/2.3L of urine and 100mls of stool   Sodium   Date Value Ref Range Status   02/04/2023 125 (L) 136 - 145 mmol/L Final   02/04/2023 124 (L) 136 - 145 mmol/L Final   02/03/2023 124 (L) 136 - 145 mmol/L Final   02/03/2023 124 (L) 136 - 145 mmol/L Final   02/02/2023 127 (L) 136 - 145 mmol/L Final   02/02/2023 125 (L) 136 - 145 mmol/L Final   02/02/2023 125 (L) 136 - 145 mmol/L Final   02/01/2023 129 (L) 136 - 145 mmol/L Final   02/01/2023 123 (L) 136 - 145 mmol/L Final   02/01/2023 123 (L) 136 - 145 mmol/L Final   01/31/2023 122 (L) 136 - 145 mmol/L Final   01/12/2023 130 (L) 136 - 145 mmol/L Final   12/22/2022 134 (L) 136 - 145 mmol/L Final   12/12/2022 136 136 - 145 mmol/L Final   12/08/2022 129 (L) 136 - 145 mmol/L Final   12/07/2022 132 (L) 136 - 145 mmol/L Final   12/06/2022 131 (L) 136 - 145 mmol/L Final   11/16/2022 139 136 - 145 mmol/L Final   10/13/2022 134 (L) 136 - 145 mmol/L Final   09/20/2022 " 132 (L) 136 - 145 mmol/L Final       Past Medical History:   Diagnosis Date    Acute coronary syndrome     Acute exacerbation of chronic obstructive pulmonary disease (COPD) 3/23/2022    Allergy     Arthritis     Cardiomyopathy     CHF (congestive heart failure)     Coronary artery disease     Coronary artery disease of native artery of native heart with stable angina pectoris 2021: [facility]: Cath: LAD: Proximal stent patent. LCX: Proximal 80%. LCX: Dominant. Moderate disease. LCX: HEVER 2.75 x 18 mm. Distal dissection. HEVER 2.75 x 22 mm.     Diverticulitis     Diverticulosis     Familial hypercholesterolemia 2022    Former smoker 2022    Heart attack     Heart disease     History of myocardial infarction 2022    Hyperlipidemia     Hypertension     Hypertension 2022    ICD (implantable cardioverter-defibrillator) in place     Non-ST elevation myocardial infarction (NSTEMI) 2019       Past Surgical History:   Procedure Laterality Date    APPENDECTOMY N/A 2022    Procedure: APPENDECTOMY;  Surgeon: Rafa Cardozo MD;  Location: [facility] OR 2ND FLR;  Service: Colon and Rectal;  Laterality: N/A;    ATRIAL CARDIAC PACEMAKER INSERTION      CECECTOMY N/A 2022    Procedure: EXCISION, CECUM;  Surgeon: Rafa Cardozo MD;  Location: [facility] OR 2ND FLR;  Service: Colon and Rectal;  Laterality: N/A;     SECTION      CHOLECYSTECTOMY N/A 2022    Procedure: CHOLECYSTECTOMY;  Surgeon: Doug Epstein MD;  Location: [facility] OR 2ND FLR;  Service: General;  Laterality: N/A;    COLONOSCOPY N/A 2022    Procedure: COLONOSCOPY;  Surgeon: Rafa Cardozo MD;  Location: [facility] OR 2ND FLR;  Service: Colon and Rectal;  Laterality: N/A;    COLOSTOMY  2022    Procedure: CREATION, COLOSTOMY;  Surgeon: Rafa Cardozo MD;  [facility] F[facility];  Service: Colon and Rectal;;    CORONARY STENT PLACEMENT      LEFT HEART CATHETERIZATION Left  1/24/2022    Procedure: CATHETERIZATION, HEART, LEFT;  Surgeon: Yohannes Cordova MD;  Location: [facility] CATH LAB;  Service: Cardiology;  Laterality: Left;       Review of patient's allergies indicates:   Allergen Reactions    Augmentin [amoxicillin-pot clavulanate] Swelling     Not allergic to amoxicillin just a derivative of augmentin    Lisinopril Other (See Comments)     Fluid around heart    Shellfish containing products Anaphylaxis     Pt.states she is allergic to SEAFOOD since the age of 13    Levaquin [levofloxacin] Other (See Comments)     Very bad joint pain    Clindamycin Palpitations     Chest pain     Current Facility-Administered Medications   Medication Frequency    acetaminophen tablet 1,000 mg Q8H PRN    albuterol inhaler 2 puff Q6H PRN    ALPRAZolam tablet 0.5 mg Nightly PRN    aspirin EC tablet 81 mg Daily    balsam peru-castor oiL Oint BID    dextrose 10% bolus 125 mL 125 mL PRN    dextrose 10% bolus 250 mL 250 mL PRN    enoxaparin injection 40 mg Daily    ezetimibe tablet 10 mg Daily    famotidine tablet 20 mg Daily    fluticasone propionate 50 mcg/actuation nasal spray 50 mcg Daily PRN    [START ON 2/5/2023] furosemide injection 120 mg Once    [START ON 2/5/2023] furosemide injection 80 mg BID    losartan tablet 25 mg Daily    metoprolol succinate (TOPROL-XL) 24 hr split tablet 12.5 mg Daily    naloxone 0.4 mg/mL injection 0.02 mg PRN    nitroGLYCERIN SL tablet 0.4 mg Q5 Min PRN    ondansetron injection 4 mg Q6H PRN    oxyCODONE immediate release tablet 5 mg Q4H PRN    polyethylene glycol packet 17 g BID    prochlorperazine injection Soln 5 mg Q6H PRN    sodium chloride 0.9% flush 10 mL PRN     Family History       Problem Relation (Age of Onset)    Emphysema Father    Heart attack Brother    Heart disease Mother          Tobacco Use    Smoking status: Former     Packs/day: 0.50     Types: Cigarettes     Start date: 2/4/1976     Quit date: 12/4/2012     Years since  quitting: 10.1    Smokeless tobacco: Never   Substance and Sexual Activity    Alcohol use: No    Drug use: No    Sexual activity: Not on file     Review of Systems  Objective:     Vital Signs (Most Recent):  Temp: 98 °F (36.7 °C) (02/04/23 1900)  Pulse: 77 (02/04/23 1900)  Resp: 16 (02/04/23 1900)  BP: (!) 102/58 (02/04/23 1900)  SpO2: 95 % (02/04/23 1900)   Vital Signs (24h Range):  Temp:  [97.9 °F (36.6 °C)-100 °F (37.8 °C)] 98 °F (36.7 °C)  Pulse:  [] 77  Resp:  [14-18] 16  SpO2:  [94 %-98 %] 95 %  BP: ()/(53-66) 102/58     Weight: 56.2 kg (124 lb) (01/31/23 1512)  Body mass index is 21.97 kg/m².  Body surface area is 1.58 meters squared.    I/O last 3 completed shifts:  In: 50 [P.O.:50]  Out: 2400 [Urine:2300; Stool:100]    Physical Exam  Vitals and nursing note reviewed.   Constitutional:       General: She is not in acute distress.     Appearance: She is ill-appearing. She is not toxic-appearing or diaphoretic.   Cardiovascular:      Rate and Rhythm: Normal rate and regular rhythm.      Pulses: Normal pulses.      Heart sounds: Murmur heard.   Pulmonary:      Effort: Pulmonary effort is normal. No respiratory distress.      Breath sounds: No wheezing, rhonchi or rales.   Abdominal:      General: Abdomen is flat. Bowel sounds are normal. There is no distension.      Palpations: Abdomen is soft. There is no mass.      Tenderness: There is no abdominal tenderness. There is no guarding or rebound.      Comments: Colostomy with visible stools (greenish, no blood), prolapsed pink stoma   Musculoskeletal:         General: No swelling, tenderness or deformity.      Right lower leg: No edema.      Left lower leg: No edema.   Skin:     General: Skin is warm.      Capillary Refill: Capillary refill takes 2 to 3 seconds.      Coloration: Skin is not jaundiced or pale.      Findings: No bruising, erythema, lesion or rash.   Neurological:      Mental Status: She is alert and oriented to person, place, and  time.       Significant Labs:  CBC:   Recent Labs   Lab 02/01/23  0606   WBC 2.32*   RBC 4.65   HGB 11.1*   HCT 36.3*      MCV 78*   MCH 23.9*   MCHC 30.6*     CMP:   Recent Labs   Lab 01/31/23  1642 02/01/23  0606 02/04/23  1921   *   < > 131*   CALCIUM 8.7   < > 8.1*   ALBUMIN 2.0*  --  1.8*   PROT 8.3  --   --    *   < > 125*   K 4.4   < > 3.4*   CO2 24   < > 34*   CL 91*   < > 82*   BUN 25*   < > 13   CREATININE 0.9   < > 0.7   ALKPHOS 200*  --   --    ALT 46*  --   --    AST 51*  --   --    BILITOT 1.4*  --   --     < > = values in this interval not displayed.     All labs within the past 24 hours have been reviewed.    Significant Imaging:  Labs: Reviewed    Assessment/Plan:     * Ischemic cardiomyopathy  Results for orders placed during the hospital encounter of 12/22/22    Echo Saline Bubble? No    Interpretation Summary  · The left ventricle is severely enlarged with eccentric hypertrophy and severely decreased systolic function. The estimated ejection fraction is 10%.  · Normal right ventricular size with mildly reduced right ventricular systolic function.  · Grade II left ventricular diastolic dysfunction.  · Biatrial enlargement.  · The estimated PA systolic pressure is 51 mmHg.  · Normal central venous pressure (3 mmHg).    -Plan per primary team       Colocutaneous fistula  ?surgery planned for Monday (2/6)   ?golytely starting tomorrow   -Plan per primary team       Hypotension  episodes since 2/3, at risk for TAMMY, review BP meds, rest per primary team     Hyponatremia  hypoosmolar hypervolemia hypoNa (and may also be d/t tea/toast hypoNa)   Pt has h/o of similar episodes in the past, latest 12/2022 she was discharged with lasix 80mg BID, however pt has NOT been taking this dose, she has been taking 40mg for the past 3 months which coincides with her hypoNa. Back in August 2022 pt was treated with tolvaptan while in the hospital however she was unable to afford it and she was able  to maintain her sNa in the low 130's with diuretic therapy.   Plan:  -increase lasix to 120mg x1 tonight, replace K to goal of 4, Mg goal of >2, phos goal of 3.0, will reassess in AM, not a candidate for tolvaptan at this time given her stability and only on 80mg PO of lasix BID yesterday and only 80mg today prior to consult   -avoid thiazides and any IVF that contain D5W  -I have ordered labs for hypoNa workup, will f/u in AM   -RFP and Mg level in AM  -strict I&Os, fluid restrict 1L, she's likely drinking more than 50mls/day  -daily weights   -nutrition consult for pt as she is unable to eat solutes to maintain a sNa level as well (d/t dental problems see HPI)         Patient case & plan was discussed with attending, Dr. Pham       Thank you for your consult. I will follow-up with patient. Please contact us if you have any additional questions.    Nikki Rivers MD  Nephrology  [facility]

## 2023-02-05 NOTE — SUBJECTIVE & OBJECTIVE
Past Medical History:   Diagnosis Date    Acute coronary syndrome     Acute exacerbation of chronic obstructive pulmonary disease (COPD) 3/23/2022    Allergy     Arthritis     Cardiomyopathy     CHF (congestive heart failure)     Coronary artery disease     Coronary artery disease of native artery of native heart with stable angina pectoris 2021: [facility]: Cath: LAD: Proximal stent patent. LCX: Proximal 80%. LCX: Dominant. Moderate disease. LCX: HEVER 2.75 x 18 mm. Distal dissection. HEVER 2.75 x 22 mm.     Diverticulitis     Diverticulosis     Familial hypercholesterolemia 2022    Former smoker 2022    Heart attack     Heart disease     History of myocardial infarction 2022    Hyperlipidemia     Hypertension     Hypertension 2022    ICD (implantable cardioverter-defibrillator) in place     Non-ST elevation myocardial infarction (NSTEMI) 2019       Past Surgical History:   Procedure Laterality Date    APPENDECTOMY N/A 2022    Procedure: APPENDECTOMY;  Surgeon: Rafa Cardozo MD;  Location: [facility] OR 2ND FLR;  Service: Colon and Rectal;  Laterality: N/A;    ATRIAL CARDIAC PACEMAKER INSERTION      CECECTOMY N/A 2022    Procedure: EXCISION, CECUM;  Surgeon: Rafa Cardozo MD;  Location: [facility] OR 2ND FLR;  Service: Colon and Rectal;  Laterality: N/A;     SECTION      CHOLECYSTECTOMY N/A 2022    Procedure: CHOLECYSTECTOMY;  Surgeon: Doug Epstein MD;  Location: [facility] 2ND FLR;  Service: General;  Laterality: N/A;    COLONOSCOPY N/A 2022    Procedure: COLONOSCOPY;  Surgeon: Rafa Cardozo MD;  Location: [facility] 2ND FLR;  Service: Colon and Rectal;  Laterality: N/A;    COLOSTOMY  2022    Procedure: CREATION, COLOSTOMY;  Surgeon: Rafa Cardozo MD;  Location: [facility] 2ND FLR;  Service: Colon and Rectal;;    CORONARY STENT PLACEMENT      LEFT HEART CATHETERIZATION Left 2022    Procedure: CATHETERIZATION, HEART, LEFT;  Surgeon: Yohannes  MD Tasha;  Location: [facility] CATH LAB;  Service: Cardiology;  Laterality: Left;       Review of patient's allergies indicates:   Allergen Reactions    Augmentin [amoxicillin-pot clavulanate] Swelling     Not allergic to amoxicillin just a derivative of augmentin    Lisinopril Other (See Comments)     Fluid around heart    Shellfish containing products Anaphylaxis     Pt.states she is allergic to SEAFOOD since the age of 13    Levaquin [levofloxacin] Other (See Comments)     Very bad joint pain    Clindamycin Palpitations     Chest pain     Current Facility-Administered Medications   Medication Frequency    acetaminophen tablet 1,000 mg Q8H PRN    albuterol inhaler 2 puff Q6H PRN    ALPRAZolam tablet 0.5 mg Nightly PRN    aspirin EC tablet 81 mg Daily    balsam peru-castor oiL Oint BID    dextrose 10% bolus 125 mL 125 mL PRN    dextrose 10% bolus 250 mL 250 mL PRN    enoxaparin injection 40 mg Daily    ezetimibe tablet 10 mg Daily    famotidine tablet 20 mg Daily    fluticasone propionate 50 mcg/actuation nasal spray 50 mcg Daily PRN    [START ON 2/5/2023] furosemide injection 120 mg Once    [START ON 2/5/2023] furosemide injection 80 mg BID    losartan tablet 25 mg Daily    metoprolol succinate (TOPROL-XL) 24 hr split tablet 12.5 mg Daily    naloxone 0.4 mg/mL injection 0.02 mg PRN    nitroGLYCERIN SL tablet 0.4 mg Q5 Min PRN    ondansetron injection 4 mg Q6H PRN    oxyCODONE immediate release tablet 5 mg Q4H PRN    polyethylene glycol packet 17 g BID    prochlorperazine injection Soln 5 mg Q6H PRN    sodium chloride 0.9% flush 10 mL PRN     Family History       Problem Relation (Age of Onset)    Emphysema Father    Heart attack Brother    Heart disease Mother          Tobacco Use    Smoking status: Former     Packs/day: 0.50     Types: Cigarettes     Start date: 2/4/1976     Quit date: 12/4/2012     Years since quitting: 10.1    Smokeless tobacco: Never   Substance and Sexual Activity    Alcohol use: No    Drug  use: No    Sexual activity: Not on file     Review of Systems  Objective:     Vital Signs (Most Recent):  Temp: 98 °F (36.7 °C) (02/04/23 1900)  Pulse: 77 (02/04/23 1900)  Resp: 16 (02/04/23 1900)  BP: (!) 102/58 (02/04/23 1900)  SpO2: 95 % (02/04/23 1900)   Vital Signs (24h Range):  Temp:  [97.9 °F (36.6 °C)-100 °F (37.8 °C)] 98 °F (36.7 °C)  Pulse:  [] 77  Resp:  [14-18] 16  SpO2:  [94 %-98 %] 95 %  BP: ()/(53-66) 102/58     Weight: 56.2 kg (124 lb) (01/31/23 1512)  Body mass index is 21.97 kg/m².  Body surface area is 1.58 meters squared.    I/O last 3 completed shifts:  In: 50 [P.O.:50]  Out: 2400 [Urine:2300; Stool:100]    Physical Exam  Vitals and nursing note reviewed.   Constitutional:       General: She is not in acute distress.     Appearance: She is ill-appearing. She is not toxic-appearing or diaphoretic.   Cardiovascular:      Rate and Rhythm: Normal rate and regular rhythm.      Pulses: Normal pulses.      Heart sounds: Murmur heard.   Pulmonary:      Effort: Pulmonary effort is normal. No respiratory distress.      Breath sounds: No wheezing, rhonchi or rales.   Abdominal:      General: Abdomen is flat. Bowel sounds are normal. There is no distension.      Palpations: Abdomen is soft. There is no mass.      Tenderness: There is no abdominal tenderness. There is no guarding or rebound.      Comments: Colostomy with visible stools (greenish, no blood), prolapsed pink stoma   Musculoskeletal:         General: No swelling, tenderness or deformity.      Right lower leg: No edema.      Left lower leg: No edema.   Skin:     General: Skin is warm.      Capillary Refill: Capillary refill takes 2 to 3 seconds.      Coloration: Skin is not jaundiced or pale.      Findings: No bruising, erythema, lesion or rash.   Neurological:      Mental Status: She is alert and oriented to person, place, and time.       Significant Labs:  CBC:   Recent Labs   Lab 02/01/23  0606   WBC 2.32*   RBC 4.65   HGB 11.1*    HCT 36.3*      MCV 78*   MCH 23.9*   MCHC 30.6*     CMP:   Recent Labs   Lab 01/31/23  1642 02/01/23  0606 02/04/23  1921   *   < > 131*   CALCIUM 8.7   < > 8.1*   ALBUMIN 2.0*  --  1.8*   PROT 8.3  --   --    *   < > 125*   K 4.4   < > 3.4*   CO2 24   < > 34*   CL 91*   < > 82*   BUN 25*   < > 13   CREATININE 0.9   < > 0.7   ALKPHOS 200*  --   --    ALT 46*  --   --    AST 51*  --   --    BILITOT 1.4*  --   --     < > = values in this interval not displayed.     All labs within the past 24 hours have been reviewed.    Significant Imaging:  Labs: Reviewed

## 2023-02-05 NOTE — ASSESSMENT & PLAN NOTE
Results for orders placed during the hospital encounter of 12/22/22    Echo Saline Bubble? No    Interpretation Summary  · The left ventricle is severely enlarged with eccentric hypertrophy and severely decreased systolic function. The estimated ejection fraction is 10%.  · Normal right ventricular size with mildly reduced right ventricular systolic function.  · Grade II left ventricular diastolic dysfunction.  · Biatrial enlargement.  · The estimated PA systolic pressure is 51 mmHg.  · Normal central venous pressure (3 mmHg).    -Plan per primary team

## 2023-02-05 NOTE — ASSESSMENT & PLAN NOTE
Patient with chronic hyponatremia, poor prognostic indicator in the setting of heart failure. Initially improving with diuresis, however still 124 despite euvolemia on exam. On chart review, patient was previously given Tolvaptan last year with improvement and was supposed to be on the medication outpatient, however cost prohibited from taking.   - nephrology consulted and following with recommendation of not starting tolvaptan at this time  - f/u repeat urine studies  - would consider tolvaptan if not improving however will defer to nephrology recs

## 2023-02-05 NOTE — SUBJECTIVE & OBJECTIVE
Medications:  Continuous Infusions:  Scheduled Meds:   aspirin  81 mg Oral Daily    balsam peru-castor oiL   Topical (Top) BID    enoxaparin  40 mg Subcutaneous Daily    ezetimibe  10 mg Oral Daily    famotidine  20 mg Oral Daily    furosemide (LASIX) injection  120 mg Intravenous Once    furosemide (LASIX) injection  80 mg Intravenous BID    losartan  12.5 mg Oral Daily    magnesium sulfate IVPB  2 g Intravenous Q2H    metoprolol succinate  12.5 mg Oral Daily    polyethylene glycol  17 g Oral BID    potassium bicarbonate  40 mEq Oral Q2H     PRN Meds:acetaminophen, albuterol, ALPRAZolam, dextrose 10%, dextrose 10%, fluticasone propionate, naloxone, nitroGLYCERIN, ondansetron, oxyCODONE, prochlorperazine, sodium chloride 0.9%     Objective:     Vital Signs (Most Recent):  Temp: 98 °F (36.7 °C) (02/05/23 0456)  Pulse: 85 (02/05/23 0720)  Resp: 16 (02/05/23 0859)  BP: (!) 109/58 (02/05/23 0720)  SpO2: 95 % (02/05/23 0720)   Vital Signs (24h Range):  Temp:  [97.9 °F (36.6 °C)-99 °F (37.2 °C)] 98 °F (36.7 °C)  Pulse:  [76-89] 85  Resp:  [14-18] 16  SpO2:  [94 %-98 %] 95 %  BP: ()/(42-59) 109/58         Physical Exam  HENT:      Head: Normocephalic.      Mouth/Throat:      Mouth: Mucous membranes are moist.   Eyes:      Pupils: Pupils are equal, round, and reactive to light.   Cardiovascular:      Rate and Rhythm: Normal rate.      Comments: JVD around clavicle  Pulmonary:      Effort: Pulmonary effort is normal.   Abdominal:      Comments: Soft, mild TTP around colocutaneous fistula,  clean, intact.   Musculoskeletal:         General: Normal range of motion.      Cervical back: Normal range of motion.   Skin:     General: Skin is warm.      Capillary Refill: Capillary refill takes less than 2 seconds.   Neurological:      General: No focal deficit present.      Mental Status: She is alert and oriented to person, place, and time.   Psychiatric:         Mood and Affect: Mood normal  Significant Labs:  All  pertinent labs from the last 24 hours have been reviewed  Significant Labs:  All pertinent labs from the last 24 hours have been reviewed.    Significant Diagnostics:  I have reviewed and interpreted all pertinent imaging results/findings within the past 24 hours.  No results found in the last 24 hours.

## 2023-02-05 NOTE — NURSING
BP rechecked per nephro to administer overnight x1 lasix 120mg, latest /58, notified surgery on-call regarding pt wanting to only take 80mg of lasix instead, order modified.

## 2023-02-05 NOTE — ASSESSMENT & PLAN NOTE
Acute on chronic combined systolic and diastolic heart failure  Recent echo showed EF of 10%  Patient appears volume overloaded on examination  Currently on Toprol-XL 12.5, losartan 25 mg.  CXR without new detrimental changes since prior, BNP 2600     Plan  - Patient appears euvolemic on exam recommend transition to PO Lasix 80mg bid   - Replete Mag>2, K>4  - Continue Toprol-XL 12.5

## 2023-02-05 NOTE — HPI
"Odette Hart is a 67 y.o. female w/ known HTN, Hyperlipidemia, HFrEF (10%), s/p ICD placement (2014), MI s/ stent to LAD (2012), [facility] HEVER x2 in LCx (1/2022), RBB, colovesicular fistula with chronic thickenned GB s/p choecystectomy, sigmoid colectomy and end colostomy (5/2022) complicated by new colocutaneous fistula and incarcerated colonic prolase (1/2023), and anemia admitted on 1/31/2023 for evaluation and management of worsening colocutaneous fistula that was no longer able to be reduced with dusky discoloration and pt experiencing nausea and vomiting.     Pt states that, she has been following with her cardiologist closely and that he had started her on lasix 80mg BID for her heart failure however she self-adjusted the dose to 40mg BID and then to only 40mg daily d/t feeling very thirsty and "dehydrated", for the past 3 months, she has been taking only 40mg of lasix daily. In addition, pt reports not having very good appetite and difficulty with eating d/t not having her posterior teeth, she can chew meat and was on Boost protein shakes, crackers, peanut butter and jelly sandwiches, , 1 Sprite soda per day. She recalls having hyponatremia and was started on tolvaptan in the hosptial however she didn't take tolvaptan when discharged d/t pt not being able to afford tolvaptan $5,000/month supply, her Na corrected only on diuretics.     Initial labs were notable for: sNa 122, K 4.4, bicarb 24, BUN 25, sCr 0.9, alk phos 200, albumin 2.0, t. Tan 2, AST/ALT 51/46, BNP 2604. Hematology labs showed WBC 3.78, hgb 11.8, plts 237  A CXR showed stable pulmonary markings, no pleural effusions, and known cardiomegaly   On 2/1 she received metoprolol 25mg-->12.5mg BID since 2/2,   2/2 Phos 2.4-->1.6-->3 on 2/4 with replacement  Pt has also received Mg & K replacements     Lasix 80mg IV Q12H (2/1, 2/2), 80mg PO x1 on 2/3, 80mg PO on 2/4  Losartan 25mg on 2/3  2/3 sOsm: 274  2/3 Urine labs: Osm 460, Na 16  In the last 24 " hrs her I/Os are: 50ml/2.3L of urine and 100mls of stool

## 2023-02-05 NOTE — SUBJECTIVE & OBJECTIVE
Interval History: Overnight, patient received IV Lasix 80mg x1 with good UOP on documentation.  Patient reports decrease UOP recently.  Patient anxious and concerned about Lasix dosing, wanting to reduce it as much as possible.  States feeling volume overloaded with chest pain/pressure that she always feels when volume overloaded.  Denies orthopnea, palpitations, lightheadedness, or dizziness.    Review of Systems   Constitutional: Negative.   Cardiovascular:  Positive for chest pain and dyspnea on exertion. Negative for leg swelling, near-syncope, orthopnea and palpitations.   Respiratory:  Negative for cough and shortness of breath (at rest).    Gastrointestinal: Negative.    Genitourinary: Negative.    Neurological: Negative.    Psychiatric/Behavioral:  The patient is nervous/anxious.    Objective:     Vital Signs (Most Recent):  Temp: 98 °F (36.7 °C) (02/05/23 0456)  Pulse: 85 (02/05/23 0720)  Resp: 16 (02/05/23 0859)  BP: (!) 109/58 (02/05/23 0720)  SpO2: 95 % (02/05/23 0720)   Vital Signs (24h Range):  Temp:  [97.9 °F (36.6 °C)-99 °F (37.2 °C)] 98 °F (36.7 °C)  Pulse:  [76-89] 85  Resp:  [14-18] 16  SpO2:  [94 %-98 %] 95 %  BP: ()/(42-59) 109/58     Weight: 56.2 kg (124 lb)  Body mass index is 21.97 kg/m².     SpO2: 95 %         Intake/Output Summary (Last 24 hours) at 2/5/2023 0952  Last data filed at 2/5/2023 0604  Gross per 24 hour   Intake 240 ml   Output 1650 ml   Net -1410 ml       Lines/Drains/Airways       Drain  Duration                  Colostomy 05/09/22 1100 Descending/sigmoid  days              Peripheral Intravenous Line  Duration                  Peripheral IV - Single Lumen 02/03/23 1604 18 G;1 3/4 in Left;Anterior Upper Arm 1 day                    Physical Exam  Vitals and nursing note reviewed.   Constitutional:       General: She is not in acute distress.  HENT:      Head: Normocephalic.      Nose: Nose normal.      Mouth/Throat:      Mouth: Mucous membranes are moist.   Eyes:       General: No scleral icterus.     Extraocular Movements: Extraocular movements intact.      Conjunctiva/sclera: Conjunctivae normal.   Neck:      Comments: Jvd around clavicle  Cardiovascular:      Rate and Rhythm: Normal rate and regular rhythm.   Pulmonary:      Effort: Pulmonary effort is normal. No respiratory distress.      Breath sounds: Rales (mild basilar) present. No wheezing.   Abdominal:      General: Abdomen is flat. There is no distension.      Palpations: Abdomen is soft.      Tenderness: There is no abdominal tenderness. There is no guarding.   Musculoskeletal:         General: No tenderness. Normal range of motion.      Cervical back: Normal range of motion and neck supple.      Right lower leg: No edema.      Left lower leg: No edema.   Skin:     General: Skin is warm.   Neurological:      General: No focal deficit present.      Mental Status: She is alert and oriented to person, place, and time. Mental status is at baseline.       Significant Labs: CMP   Recent Labs   Lab 02/04/23  0219 02/04/23  1921 02/05/23  0447   * 125* 123*   K 3.5 3.4* 3.3*   CL 83* 82* 81*   CO2 32* 34* 31*   * 131* 109   BUN 14 13 13   CREATININE 0.7 0.7 0.7   CALCIUM 8.0* 8.1* 8.3*   ALBUMIN  --  1.8*  --    ANIONGAP 9 9 11   , CBC No results for input(s): WBC, HGB, HCT, PLT in the last 48 hours., and All pertinent lab results from the last 24 hours have been reviewed.    Significant Imaging: Echocardiogram: 2D echo with color flow doppler: No results found for this or any previous visit. and Transthoracic echo (TTE) complete (Cupid Only):   Results for orders placed or performed during the hospital encounter of 12/22/22   Echo Saline Bubble? No   Result Value Ref Range    Ascending aorta 3.24 cm    STJ 2.95 cm    AV mean gradient 4 mmHg    Ao peak spencer 1.34 m/s    Ao VTI 22.75 cm    IVS 0.76 0.6 - 1.1 cm    LA size 4.50 cm    Left Atrium Major Axis 6.57 cm    Left Atrium Minor Axis 6.43 cm    LVIDd 6.95  (A) 3.5 - 6.0 cm    LVIDs 6.65 (A) 2.1 - 4.0 cm    LVOT diameter 2.09 cm    LVOT peak VTI 16.86 cm    Posterior Wall 0.82 0.6 - 1.1 cm    MV Peak A Thad 0.53 m/s    E wave deceleration time 131.31 msec    MV Peak E Thad 0.77 m/s    RA Major Axis 5.36 cm    RA Width 3.15 cm    RVDD 4.05 cm    Sinus 3.20 cm    TAPSE 1.30 cm    TR Max Thad 3.46 m/s    TDI LATERAL 0.03 m/s    TDI SEPTAL 0.04 m/s    LA WIDTH 4.56 cm    MV stenosis pressure 1/2 time 38.08 ms    LV Diastolic Volume 251.70 mL    LV Systolic Volume 227.60 mL    LVOT peak thad 1.09 m/s    Mr max thad 0.04 m/s    LA volume (mod) 85.48 cm3    LV LATERAL E/E' RATIO 25.67 m/s    LV SEPTAL E/E' RATIO 19.25 m/s    FS 4 %    LA volume 113.36 cm3    LV mass 237.68 g    Left Ventricle Relative Wall Thickness 0.24 cm    AV valve area 2.54 cm2    AV Velocity Ratio 0.81     AV index (prosthetic) 0.74     MV valve area p 1/2 method 5.78 cm2    E/A ratio 1.45     Mean e' 0.04 m/s    LVOT area 3.4 cm2    LVOT stroke volume 57.81 cm3    AV peak gradient 7 mmHg    E/E' ratio 22.00 m/s    Triscuspid Valve Regurgitation Peak Gradient 48 mmHg    Right Atrial Pressure (from IVC) 3 mmHg    EF 10 %    TV rest pulmonary artery pressure 51 mmHg    Narrative    · The left ventricle is severely enlarged with eccentric hypertrophy and   severely decreased systolic function. The estimated ejection fraction is   10%.  · Normal right ventricular size with mildly reduced right ventricular   systolic function.  · Grade II left ventricular diastolic dysfunction.  · Biatrial enlargement.  · The estimated PA systolic pressure is 51 mmHg.  · Normal central venous pressure (3 mmHg).

## 2023-02-05 NOTE — ASSESSMENT & PLAN NOTE
Pressures are well maintained  Continue Losartan 25mg daily and Metoprolol 12.5 mg daily    - hold losartan prior to sugery  - decreased losartan to 12.5 mg qd due to hypotension

## 2023-02-05 NOTE — PROGRESS NOTES
[facility]  Vascular Surgery  Progress Note    Patient Name: Odette Hart  MRN: [id number]  Admission Date: 1/31/2023  Primary Care Provider: Brxaton Barragan MD    Subjective:     Interval History: OR tomorrow. Bowel prep ordered. NPO @ midnight. Lytes repleted. Periop abx.     Post-Op Info:  * No surgery found *           Medications:  Continuous Infusions:  Scheduled Meds:   aspirin  81 mg Oral Daily    balsam peru-castor oiL   Topical (Top) BID    enoxaparin  40 mg Subcutaneous Daily    ezetimibe  10 mg Oral Daily    famotidine  20 mg Oral Daily    furosemide (LASIX) injection  120 mg Intravenous Once    furosemide (LASIX) injection  80 mg Intravenous BID    losartan  12.5 mg Oral Daily    magnesium sulfate IVPB  2 g Intravenous Q2H    metoprolol succinate  12.5 mg Oral Daily    polyethylene glycol  17 g Oral BID    potassium bicarbonate  40 mEq Oral Q2H     PRN Meds:acetaminophen, albuterol, ALPRAZolam, dextrose 10%, dextrose 10%, fluticasone propionate, naloxone, nitroGLYCERIN, ondansetron, oxyCODONE, prochlorperazine, sodium chloride 0.9%     Objective:     Vital Signs (Most Recent):  Temp: 98 °F (36.7 °C) (02/05/23 0456)  Pulse: 85 (02/05/23 0720)  Resp: 16 (02/05/23 0859)  BP: (!) 109/58 (02/05/23 0720)  SpO2: 95 % (02/05/23 0720)   Vital Signs (24h Range):  Temp:  [97.9 °F (36.6 °C)-99 °F (37.2 °C)] 98 °F (36.7 °C)  Pulse:  [76-89] 85  Resp:  [14-18] 16  SpO2:  [94 %-98 %] 95 %  BP: ()/(42-59) 109/58         Physical Exam  HENT:      Head: Normocephalic.      Mouth/Throat:      Mouth: Mucous membranes are moist.   Eyes:      Pupils: Pupils are equal, round, and reactive to light.   Cardiovascular:      Rate and Rhythm: Normal rate.      Comments: JVD around clavicle  Pulmonary:      Effort: Pulmonary effort is normal.   Abdominal:      Comments: Soft, mild TTP around colocutaneous fistula,  clean, intact.   Musculoskeletal:         General: Normal range of motion.      Cervical back: Normal  range of motion.   Skin:     General: Skin is warm.      Capillary Refill: Capillary refill takes less than 2 seconds.   Neurological:      General: No focal deficit present.      Mental Status: She is alert and oriented to person, place, and time.   Psychiatric:         Mood and Affect: Mood normal    Significant Labs:  All pertinent labs from the last 24 hours have been reviewed.    Significant Diagnostics:  I have reviewed and interpreted all pertinent imaging results/findings within the past 24 hours.  No results found in the last 24 hours.    Assessment/Plan:     Colocutaneous fistula   67 y.o. female well known to the CRS service with history of HTN, CAD s/p PCI c/b HFrEF (EF 10%), MDD, debility, severe malnutrition, and open cholecystectomy and takedown of colovesical and colovaginal fistuli (two separate areas) with sigmoid colectomy, partial cecectomy and appendectomy, drainage of intra-abdominal abscess and end colostomy 5/9/2022 recently admitted 12/22/22 - 12/30/22 for new colocutaneous fistula who presents with incarcerated colonic prolapse from her fistula     - h/o CAD/MI and HFrEF, following cards recommendations   - Continue home HFrEF medications, 1L fluid restriction.   - Continue regular diet, Miralax BID bowel regimen. NPO @ midnight  - Continue local prolapse care with stoma bag and abdominal binder  - Serial abdominal exams in setting of mucosal ischemia prior to prolapse reduction  - OR tomorrow, bowel prep today  - periop abx    - Dispo: ongoing, [facility]        Giovany Gleason MD  Vascular Surgery  [facility]

## 2023-02-05 NOTE — PROGRESS NOTES
[facility] Medicine  Progress Note    Patient Name: Odette Hart  MRN: [id number]  Patient Class: IP- Inpatient   Admission Date: 1/31/2023  Length of Stay: 5 days  Attending Physician: Rafa Cardozo MD  Primary Care Provider: Braxton Barragan MD        Subjective:     Principal Problem:Ischemic cardiomyopathy    HPI:  66 yo F with HTN, HLD (statin intolerance), DM, CAD s/p PCI to LAD (2012)/LCX (1/2022), HFrEF (EF10%), debility, severe malnutrition, and the following procedures performed 5/2022: cholecystectomy, takedown of colovesical and colovaginal fistula, sigmoid colectomy, partial cecectomy, end colostomy who presents for colocutaneous fistula repair with abdominal wall reconstruction. On admission she was found to be hyponatremic and in acute on chronic decompensated heart failure. Cardiology was consulted to manage her HF and have since signed off as her volume status has become more euvolemic following diuresis. Medicine was consulted for pr-eop evaluation and continued management of her hyponatremia. Per chart review patient is chronically, mildly hyponatremic at baseline. She takes Lasix, Toprol, spironolactone, and losartan for her HFrEF. She reports correct use of her home medications. She states her diet consists of peanut butter and crackers. Denies CP/SOB/nausea vomiting, cough fever chills, polydipsia. Her Na has been improving with diuresis. Patient has multiple risk factors that complicate surgery: acute on chronic decompensated HF, CAD with PCIx2, hypertension, hyperlipidemia, malnutrition. This being said she does not have reported/documented/observed dysfunction of liver, kidney, lung, clotting.    On examination, she is chronically ill appearing/in no acute distress/at mental baseline. AF, /58 (78), sat 98% on RA, no leukocytosis, hgb 11.8, Hct 38.5, Plts 237, moderate Hyponatremia Na 124 (initially 122 on admission) with labile Baseline, K 3.1, Cl 85, Bun 13, Cr  00.8, Ca 7.9, albumin 2-3. BNP 2600, UOP 2300 on lasix 80mg    CXR with mild pulmonary congestion      Overview/Hospital Course:  Admitted for planned colocutaneous fistula repair, found to be hyponatremic and in ADHF. Started on IV diuresis, >6 L UOP, returned to euvolemia. Urine/serum studies consistent with hypervolemic hyponatremia most likely in 2/2 to acute on chronic HF. She does have multiple factors that maker her high risk for surgery, primarily her severrely reduced LVEF 10%. Cardiology and medicine consulted for pre-operative evaluation. Agree she is high risk, but need of her procedure outweigh the risks.      Interval History:   BP 95/41->109/58 overnight/this AM, patient states she feels more fatigued today. On IV lasix 80 mg bid. Ordered lasix 120 mg x1 held due to hypotension. Losartan held this AM. Net -1.4L. Na 125->123.     Review of Systems   Constitutional:  Negative for chills and fever.   HENT:  Negative for congestion, rhinorrhea and sore throat.    Respiratory:  Negative for cough, shortness of breath and wheezing.    Cardiovascular:  Negative for chest pain and palpitations.   Gastrointestinal:  Positive for nausea. Negative for abdominal pain and vomiting.   Endocrine: Negative for polydipsia.   Genitourinary:  Negative for difficulty urinating, dysuria and hematuria.   Skin:  Positive for wound (colosotomy with colocutaneous fistula).   Neurological:  Negative for dizziness, syncope, weakness, light-headedness and headaches.   Psychiatric/Behavioral:  Negative for agitation and confusion.    Objective:     Vital Signs (Most Recent):  Temp: 98 °F (36.7 °C) (02/05/23 0456)  Pulse: 85 (02/05/23 0720)  Resp: 17 (02/05/23 0720)  BP: (!) 109/58 (02/05/23 0720)  SpO2: 95 % (02/05/23 0720)   Vital Signs (24h Range):  Temp:  [97.9 °F (36.6 °C)-99 °F (37.2 °C)] 98 °F (36.7 °C)  Pulse:  [76-89] 85  Resp:  [14-18] 17  SpO2:  [94 %-98 %] 95 %  BP: ()/(42-59) 109/58     Weight: 56.2 kg (124  lb)  Body mass index is 21.97 kg/m².    Intake/Output Summary (Last 24 hours) at 2/5/2023 0840  Last data filed at 2/5/2023 0604  Gross per 24 hour   Intake 240 ml   Output 1650 ml   Net -1410 ml        Physical Exam  Vitals and nursing note reviewed.   Constitutional:       General: She is not in acute distress.     Appearance: She is ill-appearing.   HENT:      Head: Normocephalic and atraumatic.      Right Ear: External ear normal.      Left Ear: External ear normal.      Nose: No congestion or rhinorrhea.   Eyes:      General:         Right eye: No discharge.         Left eye: No discharge.      Extraocular Movements: Extraocular movements intact.      Pupils: Pupils are equal, round, and reactive to light.   Cardiovascular:      Rate and Rhythm: Normal rate and regular rhythm.      Heart sounds: Normal heart sounds. No murmur heard.  Pulmonary:      Effort: No respiratory distress.      Breath sounds: Rales (bibasilar) present. No wheezing.   Abdominal:      General: There is no distension.      Comments: Colostomy with colocutaneous fistula, colostomy bag in place with normal appearing liquid contents   Musculoskeletal:         General: No tenderness.      Cervical back: Normal range of motion. No rigidity.      Right lower leg: No edema.      Left lower leg: No edema.   Skin:     Findings: Bruising present. No erythema.   Neurological:      General: No focal deficit present.      Mental Status: She is alert and oriented to person, place, and time.   Psychiatric:         Mood and Affect: Mood normal.         Behavior: Behavior normal.       Significant Labs: All pertinent labs within the past 24 hours have been reviewed.  BMP:   Recent Labs   Lab 02/05/23  0447      *   K 3.3*   CL 81*   CO2 31*   BUN 13   CREATININE 0.7   CALCIUM 8.3*   MG 1.4*         Significant Imaging: I have reviewed all pertinent imaging results/findings within the past 24 hours.      Assessment/Plan:      * Ischemic  cardiomyopathy  Cardiology consulted for management of ADHF, now signed off  Recent echo showed EF of 10%  Patient hypervolemic on presentation, appears more euvolemic today  Home GDMT: Toprol-XL 25, losartan 25 mg.  Initial CXR without new detrimental changes since prior, BNP 2600 on arrival    Recommendations:   - diuresis per cardiology/nephrology, currently IV lasix 80 mg BID  - replete Mag>2, K>4  - continue Toprol-XL 12.5 per cardiology recommendations  - losartan 25mg qd, decreased to 12.5 mg qd given hypotension, hold prior to surgery    Preop examination  Patient has multiple risk factors that complicate surgery: acute on chronic decompensated HF, CAD with PCIx2, hypertension, hyperlipidemia, malnutrition. This being said she does not have reported/documented/observed dysfunction of liver, kidney, lung, clotting. From medicine standpoint, patients need for procedure currently outweigh her risks. Will defer to cardiology for cardiac risk assessment    Colocutaneous fistula  - managed by CRS  - scheduled for colostomy revision 2/6    Hyponatremia  Chronic mild hyponatremia on chart review, now moderate on presentation  Found to be in ADHF on arrival  BNP 2600, volume overloaded, JVD present, bilateral LE edema  CXR with mild pulmonary congestion  Has since had adequate urine output on Lasix 80mg IV BID, with some improvement of Na  Reports poor diet at home (crackers/peanutbutter)  Reduced albumin likely 2/2 to malnutrition    Urine/serum studies consistent with hypervolemic hyponatremia  Multifactorial, hypervolemic + low solute intake    - continue diuresis  - strict I/Os  - 1000cc fluid restriction  - nephrology consulted, appreciate input: not tolvaptan candidate per nephro    Hypertension  Pressures are well maintained  Continue Losartan 25mg daily and Metoprolol 12.5 mg daily    - hold losartan prior to sugery  - decreased losartan to 12.5 mg qd due to hypotension    Hypercholesterolemia  Lipids well  controlled  Has documented statin intolerance    - continue ezetimibe    Coronary artery disease  History of Cad with PCIx2 LAD in 2012 and LCX 1/2022  Home regimen: DAPT    - continue aspirin  - holding clopidogrel for pending procedure      VTE Risk Mitigation (From admission, onward)         Ordered     enoxaparin injection 40 mg  Daily         01/31/23 1202     IP VTE HIGH RISK PATIENT  Once         01/31/23 1202     Place sequential compression device  Until discontinued         01/31/23 1202                Discharge Planning   SIVAN: 2/7/2023     Code Status: Full Code   Is the patient medically ready for discharge?:     Reason for patient still in hospital (select all that apply): Treatment  Discharge Plan A: Home Health          Emma Quintanilla MD  Department of Hospital Medicine   [facility]

## 2023-02-05 NOTE — ASSESSMENT & PLAN NOTE
History of Cad with PCIx2 LAD in 2012 and LCX 1/2022  Home regimen: DAPT    - continue aspirin  - holding clopidogrel for pending procedure

## 2023-02-05 NOTE — SUBJECTIVE & OBJECTIVE
Interval History:   BP 95/41->109/58 overnight/this AM, patient states she feels more fatigued today. On IV lasix 80 mg bid. Ordered lasix 120 mg x1 held due to hypotension. Losartan held this AM. Net -1.4L. Na 125->123.     Review of Systems   Constitutional:  Negative for chills and fever.   HENT:  Negative for congestion, rhinorrhea and sore throat.    Respiratory:  Negative for cough, shortness of breath and wheezing.    Cardiovascular:  Negative for chest pain and palpitations.   Gastrointestinal:  Positive for nausea. Negative for abdominal pain and vomiting.   Endocrine: Negative for polydipsia.   Genitourinary:  Negative for difficulty urinating, dysuria and hematuria.   Skin:  Positive for wound (colosotomy with colocutaneous fistula).   Neurological:  Negative for dizziness, syncope, weakness, light-headedness and headaches.   Psychiatric/Behavioral:  Negative for agitation and confusion.    Objective:     Vital Signs (Most Recent):  Temp: 98 °F (36.7 °C) (02/05/23 0456)  Pulse: 85 (02/05/23 0720)  Resp: 17 (02/05/23 0720)  BP: (!) 109/58 (02/05/23 0720)  SpO2: 95 % (02/05/23 0720)   Vital Signs (24h Range):  Temp:  [97.9 °F (36.6 °C)-99 °F (37.2 °C)] 98 °F (36.7 °C)  Pulse:  [76-89] 85  Resp:  [14-18] 17  SpO2:  [94 %-98 %] 95 %  BP: ()/(42-59) 109/58     Weight: 56.2 kg (124 lb)  Body mass index is 21.97 kg/m².    Intake/Output Summary (Last 24 hours) at 2/5/2023 0840  Last data filed at 2/5/2023 0604  Gross per 24 hour   Intake 240 ml   Output 1650 ml   Net -1410 ml        Physical Exam  Vitals and nursing note reviewed.   Constitutional:       General: She is not in acute distress.     Appearance: She is ill-appearing.   HENT:      Head: Normocephalic and atraumatic.      Right Ear: External ear normal.      Left Ear: External ear normal.      Nose: No congestion or rhinorrhea.   Eyes:      General:         Right eye: No discharge.         Left eye: No discharge.      Extraocular Movements:  Extraocular movements intact.      Pupils: Pupils are equal, round, and reactive to light.   Cardiovascular:      Rate and Rhythm: Normal rate and regular rhythm.      Heart sounds: Normal heart sounds. No murmur heard.  Pulmonary:      Effort: No respiratory distress.      Breath sounds: Rales (bibasilar) present. No wheezing.   Abdominal:      General: There is no distension.      Comments: Colostomy with colocutaneous fistula, colostomy bag in place with normal appearing liquid contents   Musculoskeletal:         General: No tenderness.      Cervical back: Normal range of motion. No rigidity.      Right lower leg: No edema.      Left lower leg: No edema.   Skin:     Findings: Bruising present. No erythema.   Neurological:      General: No focal deficit present.      Mental Status: She is alert and oriented to person, place, and time.   Psychiatric:         Mood and Affect: Mood normal.         Behavior: Behavior normal.       Significant Labs: All pertinent labs within the past 24 hours have been reviewed.  BMP:   Recent Labs   Lab 02/05/23  0447      *   K 3.3*   CL 81*   CO2 31*   BUN 13   CREATININE 0.7   CALCIUM 8.3*   MG 1.4*         Significant Imaging: I have reviewed all pertinent imaging results/findings within the past 24 hours.

## 2023-02-05 NOTE — PROGRESS NOTES
[facility]  Cardiology  Progress Note    Patient Name: Odette Hart  MRN: [id number]  Admission Date: 1/31/2023  Hospital Length of Stay: 5 days  Code Status: Full Code   Attending Physician: Rafa Cardozo MD   Primary Care Physician: Braxton Barragan MD  Expected Discharge Date: 2/7/2023  Principal Problem:Ischemic cardiomyopathy    Subjective:     Hospital Course:   No notes on file    Interval History: Overnight, patient received IV Lasix 80mg x1 with good UOP on documentation.  Patient reports decrease UOP recently.  Patient anxious and concerned about Lasix dosing, wanting to reduce it as much as possible.  States feeling volume overloaded with chest pain/pressure that she always feels when volume overloaded.  Denies orthopnea, palpitations, lightheadedness, or dizziness.    Review of Systems   Constitutional: Negative.   Cardiovascular:  Positive for chest pain and dyspnea on exertion. Negative for leg swelling, near-syncope, orthopnea and palpitations.   Respiratory:  Negative for cough and shortness of breath (at rest).    Gastrointestinal: Negative.    Genitourinary: Negative.    Neurological: Negative.    Psychiatric/Behavioral:  The patient is nervous/anxious.    Objective:     Vital Signs (Most Recent):  Temp: 98 °F (36.7 °C) (02/05/23 0456)  Pulse: 85 (02/05/23 0720)  Resp: 16 (02/05/23 0859)  BP: (!) 109/58 (02/05/23 0720)  SpO2: 95 % (02/05/23 0720)   Vital Signs (24h Range):  Temp:  [97.9 °F (36.6 °C)-99 °F (37.2 °C)] 98 °F (36.7 °C)  Pulse:  [76-89] 85  Resp:  [14-18] 16  SpO2:  [94 %-98 %] 95 %  BP: ()/(42-59) 109/58     Weight: 56.2 kg (124 lb)  Body mass index is 21.97 kg/m².     SpO2: 95 %         Intake/Output Summary (Last 24 hours) at 2/5/2023 0952  Last data filed at 2/5/2023 0604  Gross per 24 hour   Intake 240 ml   Output 1650 ml   Net -1410 ml       Lines/Drains/Airways       Drain  Duration                  Colostomy 05/09/22 1100 Descending/sigmoid  days               Peripheral Intravenous Line  Duration                  Peripheral IV - Single Lumen 02/03/23 1604 18 G;1 3/4 in Left;Anterior Upper Arm 1 day                    Physical Exam  Vitals and nursing note reviewed.   Constitutional:       General: She is not in acute distress.  HENT:      Head: Normocephalic.      Nose: Nose normal.      Mouth/Throat:      Mouth: Mucous membranes are moist.   Eyes:      General: No scleral icterus.     Extraocular Movements: Extraocular movements intact.      Conjunctiva/sclera: Conjunctivae normal.   Neck:      Comments: Jvd around clavicle  Cardiovascular:      Rate and Rhythm: Normal rate and regular rhythm.   Pulmonary:      Effort: Pulmonary effort is normal. No respiratory distress.      Breath sounds: Rales (mild basilar) present. No wheezing.   Abdominal:      General: Abdomen is flat. There is no distension.      Palpations: Abdomen is soft.      Tenderness: There is no abdominal tenderness. There is no guarding.   Musculoskeletal:         General: No tenderness. Normal range of motion.      Cervical back: Normal range of motion and neck supple.      Right lower leg: No edema.      Left lower leg: No edema.   Skin:     General: Skin is warm.   Neurological:      General: No focal deficit present.      Mental Status: She is alert and oriented to person, place, and time. Mental status is at baseline.       Significant Labs: CMP   Recent Labs   Lab 02/04/23  0219 02/04/23  1921 02/05/23  0447   * 125* 123*   K 3.5 3.4* 3.3*   CL 83* 82* 81*   CO2 32* 34* 31*   * 131* 109   BUN 14 13 13   CREATININE 0.7 0.7 0.7   CALCIUM 8.0* 8.1* 8.3*   ALBUMIN  --  1.8*  --    ANIONGAP 9 9 11   , CBC No results for input(s): WBC, HGB, HCT, PLT in the last 48 hours., and All pertinent lab results from the last 24 hours have been reviewed.    Significant Imaging: Echocardiogram: 2D echo with color flow doppler: No results found for this or any previous visit. and Transthoracic  echo (TTE) complete (Cupid Only):   Results for orders placed or performed during the hospital encounter of 12/22/22   Echo Saline Bubble? No   Result Value Ref Range    Ascending aorta 3.24 cm    STJ 2.95 cm    AV mean gradient 4 mmHg    Ao peak thad 1.34 m/s    Ao VTI 22.75 cm    IVS 0.76 0.6 - 1.1 cm    LA size 4.50 cm    Left Atrium Major Axis 6.57 cm    Left Atrium Minor Axis 6.43 cm    LVIDd 6.95 (A) 3.5 - 6.0 cm    LVIDs 6.65 (A) 2.1 - 4.0 cm    LVOT diameter 2.09 cm    LVOT peak VTI 16.86 cm    Posterior Wall 0.82 0.6 - 1.1 cm    MV Peak A Thad 0.53 m/s    E wave deceleration time 131.31 msec    MV Peak E Thad 0.77 m/s    RA Major Axis 5.36 cm    RA Width 3.15 cm    RVDD 4.05 cm    Sinus 3.20 cm    TAPSE 1.30 cm    TR Max Thad 3.46 m/s    TDI LATERAL 0.03 m/s    TDI SEPTAL 0.04 m/s    LA WIDTH 4.56 cm    MV stenosis pressure 1/2 time 38.08 ms    LV Diastolic Volume 251.70 mL    LV Systolic Volume 227.60 mL    LVOT peak thad 1.09 m/s    Mr max thad 0.04 m/s    LA volume (mod) 85.48 cm3    LV LATERAL E/E' RATIO 25.67 m/s    LV SEPTAL E/E' RATIO 19.25 m/s    FS 4 %    LA volume 113.36 cm3    LV mass 237.68 g    Left Ventricle Relative Wall Thickness 0.24 cm    AV valve area 2.54 cm2    AV Velocity Ratio 0.81     AV index (prosthetic) 0.74     MV valve area p 1/2 method 5.78 cm2    E/A ratio 1.45     Mean e' 0.04 m/s    LVOT area 3.4 cm2    LVOT stroke volume 57.81 cm3    AV peak gradient 7 mmHg    E/E' ratio 22.00 m/s    Triscuspid Valve Regurgitation Peak Gradient 48 mmHg    Right Atrial Pressure (from IVC) 3 mmHg    EF 10 %    TV rest pulmonary artery pressure 51 mmHg    Narrative    · The left ventricle is severely enlarged with eccentric hypertrophy and   severely decreased systolic function. The estimated ejection fraction is   10%.  · Normal right ventricular size with mildly reduced right ventricular   systolic function.  · Grade II left ventricular diastolic dysfunction.  · Biatrial enlargement.  · The  estimated PA systolic pressure is 51 mmHg.  · Normal central venous pressure (3 mmHg).        Assessment and Plan:       * Ischemic cardiomyopathy  Acute on chronic combined systolic and diastolic heart failure  Recent echo showed EF of 10%  Patient appears volume overloaded on examination  Currently on Toprol-XL 12.5, losartan 25 mg.  CXR without new detrimental changes since prior, BNP 2600     Plan  - Patient appears euvolemic on exam recommend transition to PO Lasix 80mg bid   - Replete Mag>2, K>4  - Continue Toprol-XL 12.5      Hyponatremia  Patient with chronic hyponatremia, poor prognostic indicator in the setting of heart failure. Initially improving with diuresis, however still 124 despite euvolemia on exam. On chart review, patient was previously given Tolvaptan last year with improvement and was supposed to be on the medication outpatient, however cost prohibited from taking.   - nephrology consulted and following with recommendation of not starting tolvaptan at this time  - f/u repeat urine studies  - would consider tolvaptan if not improving however will defer to nephrology recs    Coronary artery disease  History of anterior wall MI with LAD stent in 2012 and circumflex stent in January 2022.  Continue ASA, Lipitor  Ezetimibe  Plavix on hold for surgery  Restart ASA as soon as possible if held for surgery          VTE Risk Mitigation (From admission, onward)         Ordered     enoxaparin injection 40 mg  Daily         01/31/23 1202     IP VTE HIGH RISK PATIENT  Once         01/31/23 1202     Place sequential compression device  Until discontinued         01/31/23 1202                Atif Lopez MD  Cardiology  [facility]

## 2023-02-05 NOTE — ASSESSMENT & PLAN NOTE
Chronic mild hyponatremia on chart review, now moderate on presentation  Found to be in ADHF on arrival  BNP 2600, volume overloaded, JVD present, bilateral LE edema  CXR with mild pulmonary congestion  Has since had adequate urine output on Lasix 80mg IV BID, with some improvement of Na  Reports poor diet at home (crackers/peanutbutter)  Reduced albumin likely 2/2 to malnutrition    Urine/serum studies consistent with hypervolemic hyponatremia  Multifactorial, hypervolemic + low solute intake    - continue diuresis  - strict I/Os  - 1000cc fluid restriction  - nephrology consulted, appreciate input: not tolvaptan candidate per nephro

## 2023-02-05 NOTE — ASSESSMENT & PLAN NOTE
Cardiology consulted for management of ADHF, now signed off  Recent echo showed EF of 10%  Patient hypervolemic on presentation, appears more euvolemic today  Home GDMT: Toprol-XL 25, losartan 25 mg.  Initial CXR without new detrimental changes since prior, BNP 2600 on arrival    Recommendations:   - diuresis per cardiology/nephrology, currently IV lasix 80 mg BID  - replete Mag>2, K>4  - continue Toprol-XL 12.5 per cardiology recommendations  - losartan 25mg qd, decreased to 12.5 mg qd given hypotension, hold prior to surgery

## 2023-02-05 NOTE — ASSESSMENT & PLAN NOTE
hypoosmolar hypervolemia hypoNa (and may also be d/t tea/toast hypoNa)   Pt has h/o of similar episodes in the past, latest 12/2022 she was discharged with lasix 80mg BID, however pt has NOT been taking this dose, she has been taking 40mg for the past 3 months which coincides with her hypoNa. Back in August 2022 pt was treated with tolvaptan while in the hospital however she was unable to afford it and she was able to maintain her sNa in the low 130's with diuretic therapy.   Plan:  -increase lasix to 120mg x1 tonight, replace K to goal of 4, Mg goal of >2, phos goal of 3.0, will reassess in AM, not a candidate for tolvaptan at this time given her stability and only on 80mg PO of lasix BID yesterday and only 80mg today prior to consult   -avoid thiazides and any IVF that contain D5W  -I have ordered labs for hypoNa workup, will f/u in AM   -RFP and Mg level in AM  -strict I&Os, fluid restrict 1L, she's likely drinking more than 50mls/day  -daily weights   -nutrition consult for pt as she is unable to eat solutes to maintain a sNa level as well (d/t dental problems see HPI)

## 2023-02-06 PROBLEM — Z01.818 PREOP EXAMINATION: Status: RESOLVED | Noted: 2023-01-01 | Resolved: 2023-01-01

## 2023-02-06 PROBLEM — I95.9 HYPOTENSION: Status: RESOLVED | Noted: 2022-01-01 | Resolved: 2023-01-01

## 2023-02-06 NOTE — ANESTHESIA PROCEDURE NOTES
Intubation    Date/Time: 2/6/2023 7:45 AM  Performed by: Ladi Patterson CRNA  Authorized by: Scout Khan MD     Intubation:     Induction:  Intravenous    Intubated:  Postinduction    Mask Ventilation:  Easy with oral airway    Attempts:  2    Attempted By:  CRNA    Method of Intubation:  Direct    Blade:  Mustafa 2    Laryngeal View Grade: Grade IIb - only the arytenoids and epiglottis seen      Attempted By (2nd Attempt):  CRNA    Method of Intubation (2nd Attempt):  Video laryngoscopy    Blade (2nd Attempt):  Leonides 3    Laryngeal View Grade (2nd Attempt): Grade I - full view of cords      Difficult Airway Encountered?: No      Complications:  None    Airway Device:  Oral endotracheal tube    Airway Device Size:  7.0    Style/Cuff Inflation:  Cuffed    Inflation Amount (mL):  5    Tube secured:  21    Secured at:  The lips    Placement Verified By:  Capnometry    Complicating Factors:  None    Findings Post-Intubation:  BS equal bilateral and atraumatic/condition of teeth unchanged

## 2023-02-06 NOTE — NURSING
Called pt's son Abelardo and spoke to him about Pt's missing phone.  Abelardo said that her other son, Telly, had it and he will return it to her tonight after 6pm.

## 2023-02-06 NOTE — PLAN OF CARE
POC is reviewed and understood by patient. Standby assist x1 to toilet. Receives oral pain meds for pain control. No sign of distress noted. Pt already gone to surgery.   Problem: Adult Inpatient Plan of Care  Goal: Plan of Care Review  Outcome: Ongoing, Progressing  Goal: Patient-Specific Goal (Individualized)  Outcome: Ongoing, Progressing  Goal: Absence of Hospital-Acquired Illness or Injury  Outcome: Ongoing, Progressing  Goal: Optimal Comfort and Wellbeing  Outcome: Ongoing, Progressing  Goal: Readiness for Transition of Care  Outcome: Ongoing, Progressing     Problem: Fall Injury Risk  Goal: Absence of Fall and Fall-Related Injury  Outcome: Ongoing, Progressing     Problem: Impaired Wound Healing  Goal: Optimal Wound Healing  Outcome: Ongoing, Progressing

## 2023-02-06 NOTE — TRANSFER OF CARE
"Anesthesia Transfer of Care Note    Patient: Odette Hart    Procedure(s) Performed: Procedure(s) (LRB):  REVISION, COLOSTOMY (N/A)  INSERTION, CATHETER, URETER, BILATERAL (Left)  CYSTOSCOPY, WITH URETERAL STENT INSERTION (Right)  DEBRIDEMENT, ABDOMEN (N/A)  COLECTOMY, PARTIAL  LYSIS, ADHESIONS (N/A)    Patient location: PACU    Anesthesia Type: general    Transport from OR: Transported from OR on 6-10 L/min O2 by face mask with adequate spontaneous ventilation    Post pain: adequate analgesia    Post assessment: no apparent anesthetic complications and tolerated procedure well    Post vital signs: stable    Level of consciousness: awake and alert    Nausea/Vomiting: no nausea/vomiting    Complications: none    Transfer of care protocol was followed      Last vitals:   Visit Vitals  BP (!) 100/57 (BP Location: Right arm, Patient Position: Lying)   Pulse 78   Temp 36.6 °C (97.8 °F) (Oral)   Resp 20   Ht 5' 3" (1.6 m)   Wt 56.2 kg (124 lb)   LMP  (LMP Unknown)   SpO2 98%   Breastfeeding No   BMI 21.97 kg/m²     "

## 2023-02-06 NOTE — SUBJECTIVE & OBJECTIVE
"Interval History:   Overnight pt declined high dose lasix because she does NOT want to urinate too much d/t feeling very "dehydrated".   Sodium   Date Value Ref Range Status   02/05/2023 126 (L) 136 - 145 mmol/L Final   02/05/2023 125 (L) 136 - 145 mmol/L Final   02/05/2023 123 (L) 136 - 145 mmol/L Final   02/04/2023 125 (L) 136 - 145 mmol/L Final   02/04/2023 124 (L) 136 - 145 mmol/L Final     NPO after midnight, to OR in am for surgical tx of incarcerated colonic prolapse from her fistula (ostomy), has been trying to drink Golytely   24 hours I&Os are: 240mL/1650mLs, of urine output      Review of patient's allergies indicates:   Allergen Reactions    Augmentin [amoxicillin-pot clavulanate] Swelling     Not allergic to amoxicillin just a derivative of augmentin    Lisinopril Other (See Comments)     Fluid around heart    Shellfish containing products Anaphylaxis     Pt.states she is allergic to SEAFOOD since the age of 13    Levaquin [levofloxacin] Other (See Comments)     Very bad joint pain    Clindamycin Palpitations     Chest pain     Current Facility-Administered Medications   Medication Frequency    acetaminophen tablet 1,000 mg Q8H PRN    albuterol inhaler 2 puff Q6H PRN    ALPRAZolam tablet 0.5 mg Nightly PRN    aspirin EC tablet 81 mg Daily    balsam peru-castor oiL Oint BID    ciprofloxacin HCl tablet 500 mg Q2H    dextrose 10% bolus 125 mL 125 mL PRN    dextrose 10% bolus 250 mL 250 mL PRN    enoxaparin injection 40 mg Daily    ezetimibe tablet 10 mg Daily    famotidine tablet 20 mg Daily    fluticasone propionate 50 mcg/actuation nasal spray 50 mcg Daily PRN    furosemide injection 120 mg Once    furosemide injection 80 mg BID    metoprolol succinate (TOPROL-XL) 24 hr split tablet 12.5 mg Daily    metroNIDAZOLE tablet 500 mg Q2H    naloxone 0.4 mg/mL injection 0.02 mg PRN    nitroGLYCERIN SL tablet 0.4 mg Q5 Min PRN    ondansetron injection 4 mg Q6H PRN    oxyCODONE immediate release tablet 5 mg Q4H " PRN    polyethylene glycol packet 17 g BID    potassium chloride 10 mEq in 100 mL IVPB Q1H    prochlorperazine injection Soln 5 mg Q6H PRN    sodium chloride 0.9% flush 10 mL PRN       Objective:     Vital Signs (Most Recent):  Temp: 97.3 °F (36.3 °C) (02/05/23 1954)  Pulse: 78 (02/05/23 1954)  Resp: 18 (02/05/23 2255)  BP: (!) 102/57 (02/05/23 1954)  SpO2: 98 % (02/05/23 1954)   Vital Signs (24h Range):  Temp:  [97.3 °F (36.3 °C)-99 °F (37.2 °C)] 97.3 °F (36.3 °C)  Pulse:  [65-89] 78  Resp:  [16-18] 18  SpO2:  [95 %-98 %] 98 %  BP: ()/(42-58) 102/57     Weight: 56.2 kg (124 lb) (01/31/23 1512)  Body mass index is 21.97 kg/m².  Body surface area is 1.58 meters squared.    I/O last 3 completed shifts:  In: 240 [P.O.:240]  Out: 3152 [Urine:2950; Stool:202]    Physical Exam  Vitals and nursing note reviewed.   Constitutional:       General: She is not in acute distress.     Appearance: She is ill-appearing. She is not toxic-appearing or diaphoretic.   Neck:      Comments: +JVD  Cardiovascular:      Rate and Rhythm: Normal rate and regular rhythm.      Pulses: Normal pulses.      Heart sounds: Murmur heard.   Pulmonary:      Effort: Pulmonary effort is normal. No respiratory distress.      Breath sounds: No wheezing, rhonchi or rales.   Abdominal:      General: Abdomen is flat. Bowel sounds are normal. There is no distension.      Palpations: Abdomen is soft. There is no mass.      Tenderness: There is no abdominal tenderness. There is no guarding or rebound.      Comments: Colostomy with visible stools (greenish, no blood), prolapsed pink stoma   Musculoskeletal:         General: Swelling present. No tenderness or deformity.      Right lower leg: Edema present.      Left lower leg: Edema present.      Comments: 1+ LE edema    Skin:     General: Skin is warm.      Capillary Refill: Capillary refill takes 2 to 3 seconds.      Coloration: Skin is not jaundiced or pale.      Findings: No bruising, erythema, lesion or  rash.   Neurological:      Mental Status: She is alert and oriented to person, place, and time.       Significant Labs:  CBC:   Recent Labs   Lab 02/05/23  1401   WBC 3.21*   RBC 4.74   HGB 11.3*   HCT 35.8*      MCV 76*   MCH 23.8*   MCHC 31.6*     CMP:   Recent Labs   Lab 01/31/23  1642 02/01/23  0606 02/05/23  1245 02/05/23  1401   *   < > 101 91   CALCIUM 8.7   < > 8.5* 8.6*   ALBUMIN 2.0*   < > 1.8*  --    PROT 8.3  --   --   --    *   < > 125* 126*   K 4.4   < > 3.3* 3.3*   CO2 24   < > 31* 33*   CL 91*   < > 82* 81*   BUN 25*   < > 15 15   CREATININE 0.9   < > 0.7 0.7   ALKPHOS 200*  --   --   --    ALT 46*  --   --   --    AST 51*  --   --   --    BILITOT 1.4*  --   --   --     < > = values in this interval not displayed.     All labs within the past 24 hours have been reviewed.     Significant Imaging:  Labs: Reviewed

## 2023-02-06 NOTE — OP NOTE
[facility]  Colon and Rectal Surgery    [address]  [place], [place]  Tel: [phone]  Fax: [phone]  [email or web address]   MD Wesley Fink MD Brian Kann, MD W. Forrest Johnston, MD Matthew Giglia, MD Jennifer Paruch, MD William Kethman, MD Danielle Kay, MD     Patient name: Odette Hart   YOB: 1955   MRN: [id number]  Date of surgery: 02/06/2023    Operative Report    Pre-operative diagnosis: Colocutaneous fistula  Post-operative diagnosis: Same as above    Procedure:  Open transverse and descending colectomy, takedown of colocutaneous fistula and end colostomy  Parastomal hernia repair  Lysis of adhesions for >60 minutes    Findings:  Dr. Sanchez performed local debridement of fascia and skin in anticipation of more extensive abdominal wall reconstruction - her fascia approximated at midline without tension and so more formal reconstruction was deemed higher risk and not necessary  Mid-transverse colon did not reach her pelvis and would have required division of her middle colic artery - this was not performed, the colon was able to be brought through the same ostomy site without tension  If patient desires future colostomy takedown once more nutritionally and medically optimized, we will need to be prepared to do a middle colic artery ligation and derotation/window    Pre-op    Post-op      Surgeon: Rafa Cardozo MD  Assistant:  Pablo Ramirez MD    Indication: Ms. Hart is a 67 year old woman with a history of CAD and PCI in 1/2022, HF (EF 20%), HTN, ICD, chronic hypoxemic respiratory failure, MDD, debility, severe malnutrition who underwent an open takedown of colovesical and colovaginal fistuli (two separate areas) with sigmoid colectomy, partial cecectomy and appendectomy, drainage of intra-abdominal abscess and end colostomy (with cholecystectomy by Dr. Epstein) on 5/9/2022 who unfortunately developed a non-healing  midline wound that developed into a colocutaneous fistula that was prolapsing and incarcerating intermittently (requiring reduction). She was admitted for pre-operative optimization and now takedown of her fistula with possible abdominal wall reconstruction. The benefits, risks, and alternatives were discussed with the patient, they were given the opportunity to ask questions and they elected to proceed with operative intervention after signing written consent.    Procedure:  After pre-operative assessment and review of informed consent, the patient was taken to the operating room and received general anesthesia. Pre-operative antibiotics were administered and the patient was placed in lithotomy position with extra joint padding at joints due to history of pressure wound development. The abdomen and perineum was prepped and draped in the usual sterile fashion and a timeout was performed according to [facility] Quality and Safety guidelines.      Urology began by placing a left ureteral catheter and right ureteral stent - to be dictated separately.      We performed a midline laparotomy sharply and were able to enter the abdomen just above the colocutaneous fistula. We then performed an extensive lysis of adhesions of omentum, colon, and small bowel to he anterior abdominal wall. The colocutaneous fistula was then over sewn with 3-0 Vicryl to prevent significant contamination.     We were then able to perform a colostomy takedown by incising at the mucocutaneous junction and entering the hernia sac. Once we joined our dissection planes, we were able to bring the colostomy into the abdomen - this was similarly over sewn with 3-0 Vicryl to prevent significant contamination. A wound protect was then placed.     The distal colon was carefully dissected free from small bowel, the lateral abdominal wall, left retroperitoneum, and omentum/stomach superiorly. This allowed us to carefully identify the colic mesentery that  required division to perform our colectomy. The proximal colon was encircled and stapled with a DWIGHT blue load 75 mm stapler. The distal colon mesentery was divided with Ligasure and this bowel was sent for pathology review. The mid transverse colon (proximal bowel) reached the prior colostomy site without tension as described above.     The abdomen was washed out with 4 L of warm normal saline and hemostasis verified. The case was turned over to Plastic Surgery - once determined that a more extensive abdominal wall reconstruction was not necessary, we placed Seprafilm through the abdomen and pulled the proximal colon through our colostomy site. We then approximated the midline fascia with figure-of-eight 0-PDS suture. The subcutaneous tissue was irrigated and dermis approximated with 3-0 Vicryl. Finally, the skin was approximated with interrupted vertical mattress 3-0 Nylon sutures.     The colostomy was then matured with 3-0 Vicryl in standard fashion.    Prior to closure and after final closure instrument, needle, and sponge counts were correct.    The procedure was completed without complication and was well-tolerated. The patient was then brought to the post-anesthesia care unit in stable condition. I was present for the entire operation.    This procedure was not performed to treat colon cancer through resection     Complications: None  Estimated blood loss: 75 mL  Disposition: PACU      Rafa Cardozo MD, FACS - Staff Surgeon  Department of Colon & Rectal Surgery  [facility]

## 2023-02-06 NOTE — PROGRESS NOTES
Patient Name: Odette aHrt  Date: 2/6/2023  Service: Colon and Rectal Surgery    Subjective:  No acute events overnight. Tolerated GoLytely and reports thin liquid output without significant sediment. Pain well controlled. Has been NPO at midnight. No other concerns this morning.     Medications:    Current Facility-Administered Medications:     0.9%  NaCl infusion, , Intravenous, On Call Procedure, Nadira Hendrickson, NP    acetaminophen oral solution 976.601 mg, 976.601 mg, Oral, On Call Procedure, Nadira Hendrickson, NP    acetaminophen tablet 1,000 mg, 1,000 mg, Oral, Q8H PRN, Pablo Ramirez MD, 1,000 mg at 02/01/23 1124    albuterol inhaler 2 puff, 2 puff, Inhalation, Q6H PRN, Pablo Ramirez MD    ALPRAZolam tablet 0.5 mg, 0.5 mg, Oral, Nightly PRN, Pablo Ramirez MD    aspirin EC tablet 81 mg, 81 mg, Oral, Daily, Pablo Ramirez MD, 81 mg at 02/05/23 0859    balsam peru-castor oiL Oint, , Topical (Top), BID, Pablo Ramirez MD, Given at 02/05/23 2100    dextrose 10% bolus 125 mL 125 mL, 12.5 g, Intravenous, PRN, Pablo Ramirez MD    dextrose 10% bolus 250 mL 250 mL, 25 g, Intravenous, PRN, Pablo Ramirez MD    enoxaparin injection 40 mg, 40 mg, Subcutaneous, Daily, Pablo Ramirez MD    ezetimibe tablet 10 mg, 10 mg, Oral, Daily, Pablo Ramirez MD, 10 mg at 02/05/23 0859    famotidine tablet 20 mg, 20 mg, Oral, Daily, Pablo Ramirez MD, 20 mg at 02/05/23 0859    fluticasone propionate 50 mcg/actuation nasal spray 50 mcg, 1 spray, Each Nostril, Daily PRN, Pablo Ramirez MD    furosemide injection 120 mg, 120 mg, Intravenous, Once, Pablo Ramirez MD    furosemide injection 80 mg, 80 mg, Intravenous, BID, Pablo Ramirez MD, 80 mg at 02/05/23 2149    gabapentin capsule 300 mg, 300 mg, Oral, On Call Procedure, Nadira Hendrickson NP    metronidazole IVPB 500 mg, 500 mg, Intravenous, On Call Procedure **AND** gentamicin (GARAMYCIN) 269.6 mg in sodium chloride 0.9% 100  mL IVPB, 5 mg/kg (Adjusted), Intravenous, On Call Procedure, Nadira Hendrickson NP    heparin (porcine) injection 5,000 Units, 5,000 Units, Subcutaneous, Q8H, Nadira Hendrickson NP    ibuprofen 100 mg/5 mL suspension 600 mg, 600 mg, Oral, On Call Procedure, Nadira Hendrickson NP    LIDOcaine (PF) 10 mg/ml (1%) injection 10 mg, 1 mL, Intradermal, On Call Procedure, Nadira Hendrickson NP    metoprolol succinate (TOPROL-XL) 24 hr split tablet 12.5 mg, 12.5 mg, Oral, Daily, Pablo Ramirez MD, 12.5 mg at 02/05/23 0859    mupirocin 2 % ointment 1 g, 1 g, Nasal, On Call Procedure, Nadira Hendrickson NP    naloxone 0.4 mg/mL injection 0.02 mg, 0.02 mg, Intravenous, PRN, Pablo Ramirez MD    nitroGLYCERIN SL tablet 0.4 mg, 0.4 mg, Sublingual, Q5 Min PRN, Pablo Ramirez MD    ondansetron injection 4 mg, 4 mg, Intravenous, Q6H PRN, Pablo Ramirez MD, 4 mg at 02/05/23 2311    oxyCODONE immediate release tablet 5 mg, 5 mg, Oral, Q4H PRN, Pablo Ramirez MD, 5 mg at 02/05/23 2255    polyethylene glycol packet 17 g, 17 g, Oral, BID, Pablo Ramirez MD, 17 g at 02/05/23 2132    prochlorperazine injection Soln 5 mg, 5 mg, Intravenous, Q6H PRN, Pablo Ramirez MD, 5 mg at 02/05/23 1444    sodium chloride 0.9% flush 10 mL, 10 mL, Intravenous, PRN, Pablo Ramirez MD    Vital Signs:  Vitals:    02/06/23 0635   BP: (!) 100/57   Pulse: 78   Resp: 20   Temp: 97.8 °F (36.6 °C)       In/Out:  Intake/Output - Last 3 Shifts         02/04 0700  02/05 0659 02/05 0700  02/06 0659    P.O. 240     Total Intake(mL/kg) 240 (4.3)     Urine (mL/kg/hr) 1650 (1.2) 1300 (1)    Stool  202    Total Output 1650 1502    Net -1410 -1502          Stool Occurrence  1 x            Physical Exam:  General: Alert, oriented, in no apparent distress  HEENT: Sclera anicteric, trachea midline  Lungs: Normal respiratory rate and effort on room air  Abdomen: Soft, nondistended. Midline colocutaneous fistula with ~1-2cm prolapse easily  reducible, bowel pink/well perfused; LUQ end colostomy pink/well perfused without output.  Extremities: Warm, well perfused, no LE edema BL, SCDs in place  Neuro: Grossly intact, moves all extremities  Psych: Appropriate affect    Laboratory Studies:  Complete Blood Count:  Lab Results   Component Value Date/Time    WBC 3.21 (L) 02/05/2023 02:01 PM    HGB 11.3 (L) 02/05/2023 02:01 PM    HCT 35.8 (L) 02/05/2023 02:01 PM    HCT 46 05/06/2022 09:55 PM    RBC 4.74 02/05/2023 02:01 PM     02/05/2023 02:01 PM       Basic Chemistry Panel:  Lab Results   Component Value Date/Time     (L) 02/06/2023 02:31 AM    K 3.6 02/06/2023 02:31 AM    CL 81 (L) 02/06/2023 02:31 AM    CO2 34 (H) 02/06/2023 02:31 AM    BUN 13 02/06/2023 02:31 AM    CREATININE 0.7 02/06/2023 02:31 AM    CALCIUM 8.3 (L) 02/06/2023 02:31 AM       Hepatic Panel:  Lab Results   Component Value Date/Time    AST 51 (H) 01/31/2023 04:42 PM    ALT 46 (H) 01/31/2023 04:42 PM    ALKPHOS 200 (H) 01/31/2023 04:42 PM    BILITOT 1.4 (H) 01/31/2023 04:42 PM    ALBUMIN 1.8 (L) 02/05/2023 12:45 PM       Coagulation Panel:  Lab Results   Component Value Date/Time    INR 1.2 07/21/2022 04:28 AM     Imaging Studies:  XR Chest (2/1/23):  Continued demonstration of cardiomegaly and mild accentuation of pulmonary interstitial markings in both lower lung zones, but allowing for a slightly poorer inspiratory depth level on the current examination there has been no significant detrimental interval change in the appearance of the chest since 12/08/2022.    Assessment:  Odette Hart is a 67 y.o. year old female with history of HTN, CAD s/p PCI c/b HFrEF (EF 10%), MDD, debility, severe malnutrition, and open cholecystectomy and takedown of colovesical and colovaginal fistuli (two separate areas) with sigmoid colectomy, partial cecectomy and appendectomy, drainage of intra-abdominal abscess and end colostomy 5/9/2022 recently admitted 12/22/22 - 12/30/22 for new  colocutaneous fistula who presents with incarcerated colonic prolapse from her fistula s/p manual reduction in clinic with small persistent prolapse but without recurrent incarceration.    Plan:  - Plan for OR: fistula takedown with complex abdominal wall reconstruction with possible muscle flaps and mesh placement, possible wound vac, possible ostomy  - Consent previously signed in clinic, antibiotics on call to OR; completed preoperative mechanical bowel prep + antibiosis  - Will touch base with cardiology/nephrology postoperatively to continue guiding HFrEF and hyponatremia management  - Appreciate hospitalist medicine with ongoing management of other chronic comorbidities  - Dispo: pending intraoperative findings/stability    Patient discussed with attending, Dr. Cardozo.    Pablo Ramirez MD  Colon and Rectal Surgery Fellow

## 2023-02-06 NOTE — OP NOTE
[facility]  Operative Note    Date: 02/06/2023    Pre-Op Diagnosis:   History of Colovesicular fistula, right hydronephrosis, colocutaneous fistula presenting for colostomy revision    Patient Active Problem List    Diagnosis Date Noted    Preop examination 02/03/2023    Pre-operative cardiovascular examination 01/25/2023    Colocutaneous fistula 12/22/2022    Elevated troponin 12/07/2022    Heart failure, systolic and diastolic, chronic 09/27/2022    Encounter for palliative care 08/30/2022    Acute combined systolic and diastolic congestive heart failure 08/30/2022    Shortness of breath 08/30/2022    Statin intolerance 08/10/2022    Hypertriglyceridemia 08/05/2022    Asymptomatic bacteriuria 08/01/2022    Peripheral edema 07/28/2022    History of partial colectomy 07/28/2022    Colostomy in place 07/28/2022    Advance care planning 07/21/2022    Abdominal wall cellulitis 07/20/2022    Severe protein-calorie malnutrition 07/18/2022    Hypoalbuminemia 07/07/2022    Increased nutritional needs 05/31/2022    Hypomagnesemia 05/24/2022    CAD (coronary artery disease) 05/24/2022    Recurrent major depressive disorder 05/19/2022    Hypotension 05/19/2022    VRE (vancomycin-resistant Enterococci) infection 05/19/2022    Debility 05/16/2022    Hyponatremia 05/15/2022    Prolonged Q-T interval on ECG 05/14/2022    Mood disorder 05/13/2022    Alteration in skin integrity in adult 05/13/2022    Dizziness 05/12/2022    Anemia 05/07/2022    At risk for surgical complication 04/22/2022    COPD (chronic obstructive pulmonary disease) 03/23/2022    Leukopenia 02/26/2022    History of myocardial infarction 01/24/2022    Hypertension 01/24/2022    Former smoker 01/24/2022    Normocytic anemia 01/23/2022    Hypercholesterolemia 01/22/2022    Coronary artery disease 04/19/2021    History of diverticulitis 01/12/2021    RBBB with left anterior fascicular block 04/15/2019    Chronic congestive heart failure  03/07/2019    Elevated rheumatoid factor 03/07/2019    Ischemic cardiomyopathy 02/04/2019    Automatic implantable cardioverter-defibrillator in situ 02/04/2019    History of coronary artery stent placement 02/04/2019        Post-Op Diagnosis: Same    Procedure(s) Performed:   1.  Cystoscopy with left ureteral catheter placement  2. Right ureteral stent placement  3. Placement of Quinones catheter    Specimen(s): none    Staff Surgeon: Felice Laureano MD    Assistant Surgeon: Javan Lara MD    Anesthesia: General endotracheal anesthesia    Indications: Odette Hart is a 66 y.o. female with a history of diverticulitis with colovesical and colovaginal fistulas. She is undergoing sigmoid colectomy with colorectal surgery. In addition, she has acute cholecystitis and is undergoing concomitant cholecystectomy with general surgery. Dr. Cardozo has requested intra-operative ureteral catheters to allow for early intra-operative identification and repair of any injuries.  Pre-operative imaging review revealed new right hydronephrosis, patient consented for possible right ureteral stent and right retrograde pyelogram.    Findings: Right hydronephrosis seen on right RGP, did not drain adequately; tortuous ureter, no filling defects identified. Apparently narrowing of distal right ureter on delayed images  Right ureteral stent without strings  Left ureteral catheter secured to Quinones in standard fashion    Estimated Blood Loss: min    Drains:   1.  left 5 Fr ureteral catheters  2.  16 Fr quinones catheter  3. Right ureteral stent, 6 x 24 cm without strings    Procedure in Detail: Upon entering the room the patient was under general anesthesia.  The patient was then placed in the dorsal lithotomy position and prepped and draped in the usual sterile fashion. Preoperative antibiotics were administered per the primary surgeon preference.  Timeout was performed.      A 22 Fr cystoscope was inserted into the urethra and formal  cystourethroscopy was performed. The urethra was normal.  The right and left ureteral orifices were in the normal anatomic position.  There were no mucosal abnormalities. There was a small amount of sediment seen within the bladder lumen.    The right UO was identified and cannulated with a 5 Fr open-ended ureteral catheter. Using fluoroscopy, a RGP was performed which showed the above findings: persistent hydronephrosis that did not adequately drain with a tortuous ureter. No filling defects identified. Possible narrowing of distal right ureter seen on delayed phases. Please see the intraoperative fluoroscopic images for details. First image is initial RGP, second image is approximately 2 minutes later, 3 image is approximately 5 mins after contrast instilled.    The decision was made to place an indwelling ureteral stent on the right. The 5 Fr open-tipped catheter was removed from the sterile field. A motion wire was advanced up the right ureteral orifice to the level of the expected renal pelvis.  This was confirmed using fluoroscopy.     We then passed a 6 Fr x 24 cm JJ ureteral stent without strings over the wire to the level of the renal pelvis under direct vision as well as flouroscopy. The guide wire was removed.  A 180 degree coil was observed in the renal pelvis as well as the bladder using fluoroscopy.  A 180 degree coil was also seen using direct visualization in the bladder.     The cystoscope was then reinserted and a 5 Fr ureteral catheter was advanced into the left ureteral orifice and advanced to the level of the left renal pelvis. This was confirmed using fluoroscopy. The cystoscope was then removed, leaving the ureteral catheter in place.    A 16 Fr quinones catheter was inserted and the balloon was filled with 10mL of sterile water. The ureteral catheter was secured to the Quinones catheter in the standard fashion. There were no complications with the procedure and the patient tolerated our procedure  well.     The case was then turned over to the primary surgeon.     Javan Lara MD    [facility]  Operative Note

## 2023-02-06 NOTE — NURSING TRANSFER
Nursing Transfer Note      2/6/2023     Reason patient is being transferred: post op    Transfer To: 1060    Transfer via bed    Transfer with iv pump    Transported by pct    Medicines sent: ivf, pca dilaudid    Any special needs or follow-up needed: n/a    Chart send with patient: Yes    Notified: son    Patient reassessed at: 2/6/2023 @ 9283

## 2023-02-06 NOTE — ASSESSMENT & PLAN NOTE
Sodium   Date Value Ref Range Status   02/05/2023 126 (L) 136 - 145 mmol/L Final   02/05/2023 125 (L) 136 - 145 mmol/L Final   02/05/2023 123 (L) 136 - 145 mmol/L Final   02/04/2023 125 (L) 136 - 145 mmol/L Final   02/04/2023 124 (L) 136 - 145 mmol/L Final   02/03/2023 124 (L) 136 - 145 mmol/L Final   Hypoosmolar hypervolemia hypoNa (and may also be d/t tea/toast hypoNa)   Pt has h/o of similar episodes in the past, latest 12/2022 she was discharged with lasix 80mg BID, however pt has NOT been taking this dose, she has been taking 40mg for the past 3 months which coincides with her hypoNa. Back in August 2022 pt was treated with tolvaptan while in the hospital however she was unable to afford it and she was able to maintain her sNa in the low 130's with diuretic therapy.     Plan:  -pt remains fluid overloaded, BNP 2K, JVD, and LE edema. Discussed with primary team that tolvaptan will make pt urinate more than lasix and will increase her Na but pt is reluctant to have more diuresis, still recommend lasix to 120mg x1 tonight, pt also agrees after discussion this afternoon. Currently has orders for 80mg IV Q12H   · Replace K orally when possible   -avoid thiazides and any IVF that contain D5W or NS  -hold BP medicine prior to surgery, high risk for ATN from hypotension   -urine labs, sOsm, RFP and Mg level in AM  -strict I&Os, fluid restrict 1L, she's likely drinking more than 240mls/day (her bedside table had at least 1.5L of fluids this afternoon)   -daily weights   -nutrition consult for pt as she is unable to eat solutes to maintain a sNa level as well (d/t dental problems see HPI)

## 2023-02-06 NOTE — PLAN OF CARE
[facility] 18g RUE flushed dressing c/d/I. Colostomy noted to abdomen dry/intact. Safety checklist completed. Patient refused ERAS preop meds, Dr. Cardozo notified. Awaiting MD to bedside. Family @ bedside.

## 2023-02-06 NOTE — ASSESSMENT & PLAN NOTE
Serum sodium stable  Likely etiology low effective circulating volume due to EF of 10%, may have component of low solute  Serum osm 267 --> 276  Urine Osm 460 --> 239    Plan:  -treat heart failure (GDMT per cardiology)  -can resume lasix whenever stable enough and if patient agrees with.   -serum sodium at her normal and asymptomatic at this time  -recommend nutrition consult

## 2023-02-06 NOTE — PROGRESS NOTES
Concerning critical WBC lab result: notified Dr. Zahra jeffries, Nephrology team, and paged Medicine Team 6 three times.

## 2023-02-06 NOTE — PT/OT/SLP PROGRESS
Physical Therapy  Discharge      Patient Name:  Odette Hart   MRN:  [id number]    Patient not seen today secondary to Off the floor for procedure/surgery. Will follow-up when new orders received post-op.    Wesley Yates, PT  2/6/2023

## 2023-02-06 NOTE — OP NOTE
[facility] - Surgery ([facility])  Plastic Surgery  Operative Note    SUMMARY     Date of Procedure: 2/6/2023     Location:  [facility]    Procedure(s): Procedure(s) (LRB):  REVISION, COLOSTOMY (N/A)  INSERTION, CATHETER, URETER, BILATERAL (Left)  CYSTOSCOPY, WITH URETERAL STENT INSERTION (Right)  DEBRIDEMENT, ABDOMEN (N/A)     Debridement of skin , sub q fat, fascia and muscle    Surgeon(s) and Role:  Panel 1:     * Rafa Cardozo MD - Primary     * Pablo Ramirez MD - Fellow  Panel 2:     * Felice Laureano MD - Primary     * Javan Lara MD - Resident - Assisting  Panel 3:     * Lucio aSnchez DO - Primary    Assistant: TERESSA Mojica    Pre-Operative Diagnosis: Enterocutaneous fistula [K63.2]    Post-Operative Diagnosis: same    Anesthesia: General    Indications for Procedure:  67-year-old woman with a history of diverticular disease.  She would multiple fistulas.  She developed a postoperative wound dehiscence with colocutaneous fistula.  She also has a left upper quadrant end colostomy.  She is a patient of Dr. Cardozo.  He was planning on a takedown of her colocutaneous fistula with possible revision or relocation of her colostomy.  She was a several cm midline defect and I was consulted for possible complex abdominal wall closure.  She was initially seen in the office before being admitted for an obstructed prolapsed fistula.    Operative Findings (including complications, if any):  Skin subcutaneous fat old scar fascia and muscle was debrided.  The total area was 84 sq cm.  Her fascia was able to come to midline with minimal tension    Description of Procedures:  Patient was seen in the preoperative holding area.  Discussed the surgical plan with both of her sons.  I would previously met her in the office and we signed surgical and photographic consents.  The patient was taken to the operating room the procedure was commenced.  Please see Dr. Cardozo's note for his entire  portion of the procedure    When I entered the room he is lysis had been done as well as a resection of her fistula and left colon and preparation for an end transverse colon colostomy through the previous left upper quadrant ostomy site.  The abnormal scar skin around the incision was marked.  Was removed.  Wet blue towel was placed over the abdomen to help protect the bowel.  Using a knife the skin was incised sharply and then using combination of the knife with the Bovie a sharp excisional debridement was done of the skin subcutaneous fat fascia and some of the rectus abdominis muscle.  This was done on both sides of the incision.  The areas on both sides measured 21 x 2 cm, for a total area of 84 sq cm.  Coagulation was obtained using the Bovie.  At the end of the debridement there was healthy muscle and fascial edges with no significant scarring in any abnormal inflamed tissue.    This point Dr. Cardozo re-entered the room.  The anterior and posterior sheath were able to come to the midline with minimal tension.  We had a discussion on the utility of mesh placement in this patient.  I think given this patient's nutrition and surgical history she is high risk for a incisional hernia.  We weighed this with his plans for possible reoperation with colostomy reversal and also the patient's cardiac risks for prolonged operative time.  In the end he favored minimizing operative time and avoiding mesh in the chance that he will need to reoperate on her.  At this point I turned over the case back to them where they were planning figure-of-eight closure of the abdominal wall fascia and to mature the end transverse colostomy.    Significant Surgical Tasks Conducted by the Assistant(s), if Applicable: The indicated resident served as first assistant for this procedure.    Estimated Blood Loss (EBL): 20mL           Implants:   Implant Name Type Inv. Item Serial No.  Lot No. LRB No. Used Action   STENT URETERAL  UNIV 6FR 24CM - [id number]  STENT URETERAL UNIV 6FR 24CM  [employer]  Right 1 Implanted   BARRIER SEPRAFILM ADHESION - [id number]  BARRIER SEPRAFILM ADHESION  GENZYME ANNETTE   1 Implanted       Specimens:   Specimen (24h ago, onward)       Start     Ordered    02/06/23 1054  Specimen to Pathology, Surgery Gastrointestinal tract  Once        Comments: Pre-op Diagnosis: Enterocutaneous fistula [K63.2]Procedure(s):REVISION, COLOSTOMYSIGMOIDOSCOPY, FLEXIBLEINSERTION, CATHETER, URETER, BILATERALCYSTOSCOPY, WITH URETERAL STENT INSERTIONDEBRIDEMENT, ABDOMEN Number of specimens: 1Name of specimens: 1.) transverse and left colon- permanent     References:    Click here for ordering Quick Tip   Question Answer Comment   Procedure Type: Gastrointestinal tract    Specimen Class: Routine/Screening    Which provider would you like to cc? UDAY CARMONA    Release to patient Immediate        02/06/23 1053                            Condition: Good    Disposition: , per Dr Carmona    Attestation: I performed the procedure.

## 2023-02-06 NOTE — ANESTHESIA PROCEDURE NOTES
Arterial    Diagnosis: enterocutaneous fistula  Doctor requesting consult: Juliane    Patient location during procedure: done in OR  Procedure start time: 2/6/2023 8:00 AM  Timeout: 2/6/2023 7:58 AM  Procedure end time: 2/6/2023 8:03 AM    Staffing  Authorizing Provider: Scout Khan MD  Performing Provider: Ladi Patterson CRNA    Anesthesiologist was present at the time of the procedure.    Preanesthetic Checklist  Completed: patient identified, IV checked, site marked, risks and benefits discussed, surgical consent, monitors and equipment checked, pre-op evaluation, timeout performed and anesthesia consent givenArterial  Skin Prep: chlorhexidine gluconate  Local Infiltration: none  Orientation: right  Location: radial    Catheter Size: 20 G  Catheter placement by Anatomical landmarks. Heme positive aspiration all ports. Insertion Attempts: 1  Assessment  Dressing: secured with tape and tegaderm  Patient: Tolerated well

## 2023-02-06 NOTE — PROGRESS NOTES
"[facility]  Nephrology  Progress Note    Patient Name: Odette Hart  MRN: [id number]  Admission Date: 1/31/2023  Hospital Length of Stay: 5 days  Attending Provider: Rafa Cardozo MD   Primary Care Physician: Braxton Barragan MD  Principal Problem:Ischemic cardiomyopathy    Subjective:     HPI:   Odette Hart is a 67 y.o. female w/ known HTN, Hyperlipidemia, HFrEF (10%), s/p ICD placement (2014), MI s/ stent to LAD (2012), [facility] HEVER x2 in LCx (1/2022), RBB, colovesicular fistula with chronic thickenned GB s/p choecystectomy, sigmoid colectomy and end colostomy (5/2022) complicated by new colocutaneous fistula and incarcerated colonic prolase (1/2023), and anemia admitted on 1/31/2023 for evaluation and management of worsening colocutaneous fistula that was no longer able to be reduced with dusky discoloration and pt experiencing nausea and vomiting.     Pt states that, she has been following with her cardiologist closely and that he had started her on lasix 80mg BID for her heart failure however she self-adjusted the dose to 40mg BID and then to only 40mg daily d/t feeling very thirsty and "dehydrated", for the past 3 months, she has been taking only 40mg of lasix daily. In addition, pt reports not having very good appetite and difficulty with eating d/t not having her posterior teeth, she can chew meat and was on Boost protein shakes, crackers, peanut butter and jelly sandwiches, , 1 Sprite soda per day. She recalls having hyponatremia and was started on tolvaptan in the hosptial however she didn't take tolvaptan when discharged d/t pt not being able to afford tolvaptan $5,000/month supply, her Na corrected only on diuretics.       In the ED, pt presented with the following VS:   Initial Vitals [01/31/23 1512]   BP Pulse Resp Temp SpO2   116/62 75 20 97.2 °F (36.2 °C) (!) 94 %      MAP       --         Initial labs were notable for: sNa 122, K 4.4, bicarb 24, BUN 25, sCr 0.9, alk phos 200, albumin " "2.0, tLuciano Tan 2, AST/ALT 51/46, BNP 2604. Hematology labs showed WBC 3.78, hgb 11.8, plts 237  A CXR showed stable pulmonary markings, no pleural effusions, and known cardiomegaly   On 2/1 she received metoprolol 25mg-->12.5mg BID since 2/2,   2/2 Phos 2.4-->1.6-->3 on 2/4 with replacement  Pt has also received Mg & K replacements     Nephrology was consulted for: "Chronic hyponatremia in setting of HFrEF despite euvolemia, consult for possible Tolvaptan"  Lasix 80mg IV Q12H (2/1, 2/2), 80mg PO x1 on 2/3, 80mg PO on 2/4  Losartan 25mg on 2/3  2/3 sOsm: 274  2/3 Urine labs: Osm 460, Na 16  In the last 24 hrs her I/Os are: 50ml/2.3L of urine and 100mls of stool   Sodium   Date Value Ref Range Status   02/04/2023 125 (L) 136 - 145 mmol/L Final   02/04/2023 124 (L) 136 - 145 mmol/L Final   02/03/2023 124 (L) 136 - 145 mmol/L Final   02/03/2023 124 (L) 136 - 145 mmol/L Final   02/02/2023 127 (L) 136 - 145 mmol/L Final   02/02/2023 125 (L) 136 - 145 mmol/L Final   02/02/2023 125 (L) 136 - 145 mmol/L Final   02/01/2023 129 (L) 136 - 145 mmol/L Final   02/01/2023 123 (L) 136 - 145 mmol/L Final   02/01/2023 123 (L) 136 - 145 mmol/L Final   01/31/2023 122 (L) 136 - 145 mmol/L Final   01/12/2023 130 (L) 136 - 145 mmol/L Final   12/22/2022 134 (L) 136 - 145 mmol/L Final   12/12/2022 136 136 - 145 mmol/L Final   12/08/2022 129 (L) 136 - 145 mmol/L Final   12/07/2022 132 (L) 136 - 145 mmol/L Final   12/06/2022 131 (L) 136 - 145 mmol/L Final   11/16/2022 139 136 - 145 mmol/L Final   10/13/2022 134 (L) 136 - 145 mmol/L Final   09/20/2022 132 (L) 136 - 145 mmol/L Final       Interval History:   Overnight pt declined high dose lasix because she does NOT want to urinate too much d/t feeling very "dehydrated".   Sodium   Date Value Ref Range Status   02/05/2023 126 (L) 136 - 145 mmol/L Final   02/05/2023 125 (L) 136 - 145 mmol/L Final   02/05/2023 123 (L) 136 - 145 mmol/L Final   02/04/2023 125 (L) 136 - 145 mmol/L Final   02/04/2023 " 124 (L) 136 - 145 mmol/L Final     NPO after midnight, to OR in am for surgical tx of incarcerated colonic prolapse from her fistula (ostomy), has been trying to drink Golytely   24 hours I&Os are: 240mL/1650mLs, of urine output      Review of patient's allergies indicates:   Allergen Reactions    Augmentin [amoxicillin-pot clavulanate] Swelling     Not allergic to amoxicillin just a derivative of augmentin    Lisinopril Other (See Comments)     Fluid around heart    Shellfish containing products Anaphylaxis     Pt.states she is allergic to SEAFOOD since the age of 13    Levaquin [levofloxacin] Other (See Comments)     Very bad joint pain    Clindamycin Palpitations     Chest pain     Current Facility-Administered Medications   Medication Frequency    acetaminophen tablet 1,000 mg Q8H PRN    albuterol inhaler 2 puff Q6H PRN    ALPRAZolam tablet 0.5 mg Nightly PRN    aspirin EC tablet 81 mg Daily    balsam peru-castor oiL Oint BID    ciprofloxacin HCl tablet 500 mg Q2H    dextrose 10% bolus 125 mL 125 mL PRN    dextrose 10% bolus 250 mL 250 mL PRN    enoxaparin injection 40 mg Daily    ezetimibe tablet 10 mg Daily    famotidine tablet 20 mg Daily    fluticasone propionate 50 mcg/actuation nasal spray 50 mcg Daily PRN    furosemide injection 120 mg Once    furosemide injection 80 mg BID    metoprolol succinate (TOPROL-XL) 24 hr split tablet 12.5 mg Daily    metroNIDAZOLE tablet 500 mg Q2H    naloxone 0.4 mg/mL injection 0.02 mg PRN    nitroGLYCERIN SL tablet 0.4 mg Q5 Min PRN    ondansetron injection 4 mg Q6H PRN    oxyCODONE immediate release tablet 5 mg Q4H PRN    polyethylene glycol packet 17 g BID    potassium chloride 10 mEq in 100 mL IVPB Q1H    prochlorperazine injection Soln 5 mg Q6H PRN    sodium chloride 0.9% flush 10 mL PRN       Objective:     Vital Signs (Most Recent):  Temp: 97.3 °F (36.3 °C) (02/05/23 1954)  Pulse: 78 (02/05/23 1954)  Resp: 18 (02/05/23 2255)  BP: (!) 102/57  (02/05/23 1954)  SpO2: 98 % (02/05/23 1954)   Vital Signs (24h Range):  Temp:  [97.3 °F (36.3 °C)-99 °F (37.2 °C)] 97.3 °F (36.3 °C)  Pulse:  [65-89] 78  Resp:  [16-18] 18  SpO2:  [95 %-98 %] 98 %  BP: ()/(42-58) 102/57     Weight: 56.2 kg (124 lb) (01/31/23 1512)  Body mass index is 21.97 kg/m².  Body surface area is 1.58 meters squared.    I/O last 3 completed shifts:  In: 240 [P.O.:240]  Out: 3152 [Urine:2950; Stool:202]    Physical Exam  Vitals and nursing note reviewed.   Constitutional:       General: She is not in acute distress.     Appearance: She is ill-appearing. She is not toxic-appearing or diaphoretic.   Neck:      Comments: +JVD  Cardiovascular:      Rate and Rhythm: Normal rate and regular rhythm.      Pulses: Normal pulses.      Heart sounds: Murmur heard.   Pulmonary:      Effort: Pulmonary effort is normal. No respiratory distress.      Breath sounds: No wheezing, rhonchi or rales.   Abdominal:      General: Abdomen is flat. Bowel sounds are normal. There is no distension.      Palpations: Abdomen is soft. There is no mass.      Tenderness: There is no abdominal tenderness. There is no guarding or rebound.      Comments: Colostomy with visible stools (greenish, no blood), prolapsed pink stoma   Musculoskeletal:         General: Swelling present. No tenderness or deformity.      Right lower leg: Edema present.      Left lower leg: Edema present.      Comments: 1+ LE edema    Skin:     General: Skin is warm.      Capillary Refill: Capillary refill takes 2 to 3 seconds.      Coloration: Skin is not jaundiced or pale.      Findings: No bruising, erythema, lesion or rash.   Neurological:      Mental Status: She is alert and oriented to person, place, and time.       Significant Labs:  CBC:   Recent Labs   Lab 02/05/23  1401   WBC 3.21*   RBC 4.74   HGB 11.3*   HCT 35.8*      MCV 76*   MCH 23.8*   MCHC 31.6*     CMP:   Recent Labs   Lab 01/31/23  1642 02/01/23  0606 02/05/23  1245  02/05/23  1401   *   < > 101 91   CALCIUM 8.7   < > 8.5* 8.6*   ALBUMIN 2.0*   < > 1.8*  --    PROT 8.3  --   --   --    *   < > 125* 126*   K 4.4   < > 3.3* 3.3*   CO2 24   < > 31* 33*   CL 91*   < > 82* 81*   BUN 25*   < > 15 15   CREATININE 0.9   < > 0.7 0.7   ALKPHOS 200*  --   --   --    ALT 46*  --   --   --    AST 51*  --   --   --    BILITOT 1.4*  --   --   --     < > = values in this interval not displayed.     All labs within the past 24 hours have been reviewed.     Significant Imaging:  Labs: Reviewed    Assessment/Plan:     * Ischemic cardiomyopathy  Results for orders placed during the hospital encounter of 12/22/22    Echo Saline Bubble? No    Interpretation Summary  · The left ventricle is severely enlarged with eccentric hypertrophy and severely decreased systolic function. The estimated ejection fraction is 10%.  · Normal right ventricular size with mildly reduced right ventricular systolic function.  · Grade II left ventricular diastolic dysfunction.  · Biatrial enlargement.  · The estimated PA systolic pressure is 51 mmHg.  · Normal central venous pressure (3 mmHg).    -Plan per primary team       Colocutaneous fistula  surgery planned for Monday (2/6)   -Plan per primary team       Hypotension  episodes since 2/3, at risk for TAMMY, hold meds prior to surgery, rest per primary team     Hyponatremia  Sodium   Date Value Ref Range Status   02/05/2023 126 (L) 136 - 145 mmol/L Final   02/05/2023 125 (L) 136 - 145 mmol/L Final   02/05/2023 123 (L) 136 - 145 mmol/L Final   02/04/2023 125 (L) 136 - 145 mmol/L Final   02/04/2023 124 (L) 136 - 145 mmol/L Final   02/03/2023 124 (L) 136 - 145 mmol/L Final   Hypoosmolar hypervolemia hypoNa (and may also be d/t tea/toast hypoNa)   Pt has h/o of similar episodes in the past, latest 12/2022 she was discharged with lasix 80mg BID, however pt has NOT been taking this dose, she has been taking 40mg for the past 3 months which coincides with her hypoNa.  Back in August 2022 pt was treated with tolvaptan while in the hospital however she was unable to afford it and she was able to maintain her sNa in the low 130's with diuretic therapy.     Plan:  -pt remains fluid overloaded, BNP 2K, JVD, and LE edema. Discussed with primary team that tolvaptan will make pt urinate more than lasix and will increase her Na but pt is reluctant to have more diuresis, still recommend lasix to 120mg x1 tonight, pt also agrees after discussion this afternoon. Currently has orders for 80mg IV Q12H   · Replace K orally when possible   -avoid thiazides and any IVF that contain D5W or NS  -hold BP medicine prior to surgery, high risk for ATN from hypotension   -urine labs, sOsm, RFP and Mg level in AM  -strict I&Os, fluid restrict 1L, she's likely drinking more than 240mls/day (her bedside table had at least 1.5L of fluids this afternoon)   -daily weights   -nutrition consult for pt as she is unable to eat solutes to maintain a sNa level as well (d/t dental problems see HPI)         Thank you for your consult. I will follow-up with patient. Please contact us if you have any additional questions.    Nikki Rivers MD  Nephrology  [facility]

## 2023-02-06 NOTE — SUBJECTIVE & OBJECTIVE
Interval History: status post abdominal cavity revision    Review of patient's allergies indicates:   Allergen Reactions    Augmentin [amoxicillin-pot clavulanate] Swelling     Not allergic to amoxicillin just a derivative of augmentin    Lisinopril Other (See Comments)     Fluid around heart    Shellfish containing products Anaphylaxis     Pt.states she is allergic to SEAFOOD since the age of 13    Levaquin [levofloxacin] Other (See Comments)     Very bad joint pain    Clindamycin Palpitations     Chest pain     Current Facility-Administered Medications   Medication Frequency    acetaminophen 1,000 mg/100 mL (10 mg/mL) injection 1,000 mg Q8H    Followed by    [START ON 2/7/2023] acetaminophen tablet 1,000 mg Q8H    albuterol inhaler 2 puff Q6H PRN    ALPRAZolam tablet 0.5 mg Nightly PRN    aspirin EC tablet 81 mg Daily    balsam peru-castor oiL Oint BID    dextrose 10% bolus 125 mL 125 mL PRN    dextrose 10% bolus 250 mL 250 mL PRN    ezetimibe tablet 10 mg Daily    famotidine tablet 20 mg Daily    fluticasone propionate 50 mcg/actuation nasal spray 50 mcg Daily PRN    gabapentin capsule 300 mg TID    haloperidol lactate injection 0.5 mg Q10 Min PRN    HYDROmorphone injection 0.2 mg Q5 Min PRN    HYDROmorphone PCA syringe 6 mg/30 mL (0.2 mg/mL) NS Continuous    metoprolol succinate (TOPROL-XL) 24 hr split tablet 12.5 mg Daily    mupirocin 2 % ointment BID    naloxone 0.4 mg/mL injection 0.02 mg PRN    nitroGLYCERIN SL tablet 0.4 mg Q5 Min PRN    ondansetron injection 4 mg Q6H PRN    oxyCODONE immediate release tablet 5 mg Q4H PRN    prochlorperazine injection Soln 5 mg Q6H PRN    sodium chloride 0.9% flush 10 mL PRN    sodium chloride 0.9% flush 10 mL PRN       Objective:     Vital Signs (Most Recent):  Temp: 98.1 °F (36.7 °C) (02/06/23 1345)  Pulse: 86 (02/06/23 1400)  Resp: (!) 23 (02/06/23 1400)  BP: (!) 110/53 (02/06/23 1400)  SpO2: 99 % (02/06/23 1400)   Vital Signs (24h Range):  Temp:  [97.3 °F (36.3 °C)-98.1  °F (36.7 °C)] 98.1 °F (36.7 °C)  Pulse:  [65-98] 86  Resp:  [16-51] 23  SpO2:  [96 %-100 %] 99 %  BP: ()/(50-76) 110/53     Weight: 56.2 kg (124 lb) (01/31/23 1512)  Body mass index is 21.97 kg/m².  Body surface area is 1.58 meters squared.    I/O last 3 completed shifts:  In: 240 [P.O.:240]  Out: 3152 [Urine:2950; Stool:202]    Physical Exam  Constitutional:       General: She is not in acute distress.     Appearance: She is not ill-appearing or toxic-appearing.   HENT:      Head: Normocephalic.      Nose: Nose normal. No congestion.      Mouth/Throat:      Mouth: Mucous membranes are moist.      Pharynx: No oropharyngeal exudate or posterior oropharyngeal erythema.   Eyes:      General:         Right eye: No discharge.         Left eye: No discharge.      Pupils: Pupils are equal, round, and reactive to light.   Cardiovascular:      Rate and Rhythm: Normal rate.      Heart sounds: No murmur heard.  Pulmonary:      Effort: Pulmonary effort is normal. No respiratory distress.   Musculoskeletal:      Cervical back: Normal range of motion.      Right lower leg: No edema.      Left lower leg: No edema.   Skin:     General: Skin is warm.   Neurological:      Mental Status: She is alert.       Significant Labs:  All labs within the past 24 hours have been reviewed.     Significant Imaging:  Labs: Reviewed

## 2023-02-06 NOTE — PROGRESS NOTES
"[facility] - Surgery ([facility])  Nephrology  Progress Note    Patient Name: Odette Hart  MRN: [id number]  Admission Date: 1/31/2023  Hospital Length of Stay: 6 days  Attending Provider: Rafa Cardozo MD   Primary Care Physician: Braxton Barragan MD  Principal Problem:Colocutaneous fistula    Subjective:     HPI: Odette Hart is a 67 y.o. female w/ known HTN, Hyperlipidemia, HFrEF (10%), s/p ICD placement (2014), MI s/ stent to LAD (2012), [facility] HEVER x2 in LCx (1/2022), RBB, colovesicular fistula with chronic thickenned GB s/p choecystectomy, sigmoid colectomy and end colostomy (5/2022) complicated by new colocutaneous fistula and incarcerated colonic prolase (1/2023), and anemia admitted on 1/31/2023 for evaluation and management of worsening colocutaneous fistula that was no longer able to be reduced with dusky discoloration and pt experiencing nausea and vomiting.     Pt states that, she has been following with her cardiologist closely and that he had started her on lasix 80mg BID for her heart failure however she self-adjusted the dose to 40mg BID and then to only 40mg daily d/t feeling very thirsty and "dehydrated", for the past 3 months, she has been taking only 40mg of lasix daily. In addition, pt reports not having very good appetite and difficulty with eating d/t not having her posterior teeth, she can chew meat and was on Boost protein shakes, crackers, peanut butter and jelly sandwiches, , 1 Sprite soda per day. She recalls having hyponatremia and was started on tolvaptan in the hosptial however she didn't take tolvaptan when discharged d/t pt not being able to afford tolvaptan $5,000/month supply, her Na corrected only on diuretics.     Initial labs were notable for: sNa 122, K 4.4, bicarb 24, BUN 25, sCr 0.9, alk phos 200, albumin 2.0, t. Tan 2, AST/ALT 51/46, BNP 2604. Hematology labs showed WBC 3.78, hgb 11.8, plts 237  A CXR showed stable pulmonary markings, no pleural effusions, and known " cardiomegaly   On 2/1 she received metoprolol 25mg-->12.5mg BID since 2/2,   2/2 Phos 2.4-->1.6-->3 on 2/4 with replacement  Pt has also received Mg & K replacements     Lasix 80mg IV Q12H (2/1, 2/2), 80mg PO x1 on 2/3, 80mg PO on 2/4  Losartan 25mg on 2/3  2/3 sOsm: 274  2/3 Urine labs: Osm 460, Na 16  In the last 24 hrs her I/Os are: 50ml/2.3L of urine and 100mls of stool         Interval History: status post abdominal cavity revision    Review of patient's allergies indicates:   Allergen Reactions    Augmentin [amoxicillin-pot clavulanate] Swelling     Not allergic to amoxicillin just a derivative of augmentin    Lisinopril Other (See Comments)     Fluid around heart    Shellfish containing products Anaphylaxis     Pt.states she is allergic to SEAFOOD since the age of 13    Levaquin [levofloxacin] Other (See Comments)     Very bad joint pain    Clindamycin Palpitations     Chest pain     Current Facility-Administered Medications   Medication Frequency    acetaminophen 1,000 mg/100 mL (10 mg/mL) injection 1,000 mg Q8H    Followed by    [START ON 2/7/2023] acetaminophen tablet 1,000 mg Q8H    albuterol inhaler 2 puff Q6H PRN    ALPRAZolam tablet 0.5 mg Nightly PRN    aspirin EC tablet 81 mg Daily    balsam peru-castor oiL Oint BID    dextrose 10% bolus 125 mL 125 mL PRN    dextrose 10% bolus 250 mL 250 mL PRN    ezetimibe tablet 10 mg Daily    famotidine tablet 20 mg Daily    fluticasone propionate 50 mcg/actuation nasal spray 50 mcg Daily PRN    gabapentin capsule 300 mg TID    haloperidol lactate injection 0.5 mg Q10 Min PRN    HYDROmorphone injection 0.2 mg Q5 Min PRN    HYDROmorphone PCA syringe 6 mg/30 mL (0.2 mg/mL) NS Continuous    metoprolol succinate (TOPROL-XL) 24 hr split tablet 12.5 mg Daily    mupirocin 2 % ointment BID    naloxone 0.4 mg/mL injection 0.02 mg PRN    nitroGLYCERIN SL tablet 0.4 mg Q5 Min PRN    ondansetron injection 4 mg Q6H PRN    oxyCODONE immediate release  tablet 5 mg Q4H PRN    prochlorperazine injection Soln 5 mg Q6H PRN    sodium chloride 0.9% flush 10 mL PRN    sodium chloride 0.9% flush 10 mL PRN       Objective:     Vital Signs (Most Recent):  Temp: 98.1 °F (36.7 °C) (02/06/23 1345)  Pulse: 86 (02/06/23 1400)  Resp: (!) 23 (02/06/23 1400)  BP: (!) 110/53 (02/06/23 1400)  SpO2: 99 % (02/06/23 1400)   Vital Signs (24h Range):  Temp:  [97.3 °F (36.3 °C)-98.1 °F (36.7 °C)] 98.1 °F (36.7 °C)  Pulse:  [65-98] 86  Resp:  [16-51] 23  SpO2:  [96 %-100 %] 99 %  BP: ()/(50-76) 110/53     Weight: 56.2 kg (124 lb) (01/31/23 1512)  Body mass index is 21.97 kg/m².  Body surface area is 1.58 meters squared.    I/O last 3 completed shifts:  In: 240 [P.O.:240]  Out: 3152 [Urine:2950; Stool:202]    Physical Exam  Constitutional:       General: She is not in acute distress.     Appearance: She is not ill-appearing or toxic-appearing.   HENT:      Head: Normocephalic.      Nose: Nose normal. No congestion.      Mouth/Throat:      Mouth: Mucous membranes are moist.      Pharynx: No oropharyngeal exudate or posterior oropharyngeal erythema.   Eyes:      General:         Right eye: No discharge.         Left eye: No discharge.      Pupils: Pupils are equal, round, and reactive to light.   Cardiovascular:      Rate and Rhythm: Normal rate.      Heart sounds: No murmur heard.  Pulmonary:      Effort: Pulmonary effort is normal. No respiratory distress.   Musculoskeletal:      Cervical back: Normal range of motion.      Right lower leg: No edema.      Left lower leg: No edema.   Skin:     General: Skin is warm.   Neurological:      Mental Status: She is alert.       Significant Labs:  All labs within the past 24 hours have been reviewed.     Significant Imaging:  Labs: Reviewed    Assessment/Plan:     * Colocutaneous fistula  -Plan per primary team       Hyponatremia  Serum sodium stable  Likely etiology low effective circulating volume due to EF of 10%, may have component of low  solute  Serum osm 267 --> 276  Urine Osm 460 --> 239    Plan:  -treat heart failure (GDMT per cardiology)  -can resume lasix whenever stable enough and if patient agrees with.   -serum sodium at her normal and asymptomatic at this time  -recommend nutrition consult    Ischemic cardiomyopathy  -Plan per primary team           Thank you for your consult. I will follow-up with patient. Please contact us if you have any additional questions.    Wilber Dalal MD  Nephrology  [facility] - Surgery (2nd Fl)

## 2023-02-06 NOTE — TELEPHONE ENCOUNTER
Spoke with the pt's son and informed him that the pt's medication was sent to the pharmacy. Pt stated that his mother in in surgery at the moment.

## 2023-02-07 NOTE — ANESTHESIA POSTPROCEDURE EVALUATION
Anesthesia Post Evaluation    Patient: Odette Hart    Procedure(s) Performed: Procedure(s) (LRB):  REVISION, COLOSTOMY (N/A)  INSERTION, CATHETER, URETER, BILATERAL (Left)  CYSTOSCOPY, WITH URETERAL STENT INSERTION (Right)  DEBRIDEMENT, ABDOMEN (N/A)  COLECTOMY, PARTIAL  LYSIS, ADHESIONS (N/A)    Final Anesthesia Type: general      Patient location during evaluation: PACU  Patient participation: Yes- Able to Participate  Level of consciousness: awake and alert  Post-procedure vital signs: reviewed and stable  Pain management: adequate  Airway patency: patent    PONV status at discharge: No PONV  Anesthetic complications: no      Cardiovascular status: blood pressure returned to baseline  Respiratory status: unassisted  Hydration status: euvolemic  Follow-up not needed.          Vitals Value Taken Time   BP 99/48 02/07/23 1145   Temp 36.1 °C (97 °F) 02/07/23 1145   Pulse 65 02/07/23 1145   Resp 18 02/07/23 1145   SpO2 96 % 02/07/23 1145         Event Time   Out of Recovery 02/06/2023 15:50:37         Pain/Gal Score: Pain Rating Prior to Med Admin: 8 (2/7/2023  9:38 AM)  Pain Rating Post Med Admin: 4 (2/6/2023  9:59 PM)  Gal Score: 9 (2/6/2023  1:15 PM)

## 2023-02-07 NOTE — PROGRESS NOTES
Patient Name: Odette Hart  Date: 2/7/2023  Service: Colon and Rectal Surgery    Subjective:  No acute events overnight. Pain well controlled with dPCA. Tolerated CLD without nausea/vomiting, denies bloating/belching/hiccups/heartburn. Brendan Jennings'honey this AM, pending void trial. Denies fevers/chills/sweats, lightheadedness/dizziness, chest pain/shortness of breath, upper or lower extremity edema/pain.     Medications:    Current Facility-Administered Medications:     [COMPLETED] acetaminophen 1,000 mg/100 mL (10 mg/mL) injection 1,000 mg, 1,000 mg, Intravenous, Q8H, Stopped at 02/07/23 0527 **FOLLOWED BY** acetaminophen tablet 1,000 mg, 1,000 mg, Oral, Q8H, Pablo Ramirez MD    albuterol inhaler 2 puff, 2 puff, Inhalation, Q6H PRN, Pablo Ramirez MD    ALPRAZolam tablet 0.5 mg, 0.5 mg, Oral, Nightly PRN, Pablo Ramirez MD    aspirin EC tablet 81 mg, 81 mg, Oral, Daily, Pablo Ramirez MD, 81 mg at 02/07/23 0826    balsam peru-castor oiL Oint, , Topical (Top), BID, Pablo Ramirez MD, Given at 02/07/23 0900    dextrose 10% bolus 125 mL 125 mL, 12.5 g, Intravenous, PRN, Pablo Ramirez MD    dextrose 10% bolus 250 mL 250 mL, 25 g, Intravenous, PRN, Pablo Ramirez MD    ezetimibe tablet 10 mg, 10 mg, Oral, Daily, Pablo Ramirez MD, 10 mg at 02/07/23 0825    famotidine tablet 20 mg, 20 mg, Oral, Daily, Pablo Ramirez MD, 20 mg at 02/07/23 0825    fluticasone propionate 50 mcg/actuation nasal spray 50 mcg, 1 spray, Each Nostril, Daily PRN, Pablo Ramirez MD    furosemide tablet 80 mg, 80 mg, Oral, BID, Pablo Ramirez MD    gabapentin capsule 300 mg, 300 mg, Oral, TID, Pablo Ramirez MD    HYDROmorphone PCA syringe 6 mg/30 mL (0.2 mg/mL) NS, , Intravenous, Continuous, Pablo Ramirez MD, Rate Change at 02/07/23 0938    losartan tablet 25 mg, 25 mg, Oral, Daily, Pablo Ramirez MD    metoprolol succinate (TOPROL-XL) 24 hr split tablet 12.5 mg, 12.5 mg, Oral, Daily, Pablo Ramirez,  MD, 12.5 mg at 02/07/23 0824    mupirocin 2 % ointment, , Nasal, BID, Pablo Ramirez MD, Given at 02/07/23 0900    naloxone 0.4 mg/mL injection 0.02 mg, 0.02 mg, Intravenous, PRN, Pablo Ramirez MD    nitroGLYCERIN SL tablet 0.4 mg, 0.4 mg, Sublingual, Q5 Min PRN, Pablo Ramirez MD    ondansetron injection 4 mg, 4 mg, Intravenous, Q6H PRN, Pablo Ramirez MD, 4 mg at 02/05/23 2311    oxyCODONE immediate release tablet 5 mg, 5 mg, Oral, Q4H PRN, Pablo Ramirez MD    oxyCODONE immediate release tablet Tab 10 mg, 10 mg, Oral, Q4H PRN, Pablo Ramirez MD    polyethylene glycol packet 17 g, 17 g, Oral, Daily, Pablo Ramirez MD, 17 g at 02/07/23 0945    prochlorperazine injection Soln 5 mg, 5 mg, Intravenous, Q6H PRN, Pablo Ramirez MD, 5 mg at 02/05/23 1444    sodium chloride 0.9% flush 10 mL, 10 mL, Intravenous, PRN, Pablo Ramirez MD    sodium chloride 0.9% flush 10 mL, 10 mL, Intra-Catheter, PRN, Pablo Ramirez MD    Vital Signs:  Vitals:    02/07/23 0938   BP:    Pulse:    Resp: 18   Temp:        In/Out:  Intake/Output - Last 3 Shifts         02/05 0700 02/06 0659 02/06 0700 02/07 0659 02/07 0700  02/08 0659    P.O.       IV Piggyback  1600     Total Intake(mL/kg)  1600 (28.5)     Urine (mL/kg/hr) 1300 (1) 625 (0.5)     Stool 202 0     Total Output 1502 625     Net -1502 +975            Stool Occurrence 1 x              Physical Exam:  General: Alert, oriented, in no apparent distress  HEENT: Sclera anicteric, trachea midline  Lungs: Normal respiratory rate and effort on room air  Abdomen: Soft, appropriate renata-incisional TTP, nondistended. Incision with sterile dressing from OR in place with minimal strikethrough. LUQ end colostomy pink and well perfused without flatus, stool or sweat in bag.  Extremities: Warm, well perfused, no edema, SCDs in place  Neuro: Grossly intact, moves all extremities  Psych: Appropriate affect    Laboratory Studies:  Complete Blood Count:  Lab Results    Component Value Date/Time    WBC 1.74 (LL) 02/06/2023 01:15 PM    HGB 11.7 (L) 02/06/2023 01:15 PM    HCT 36.4 (L) 02/06/2023 01:15 PM    HCT 46 05/06/2022 09:55 PM    RBC 4.82 02/06/2023 01:15 PM     02/06/2023 01:15 PM       Basic Chemistry Panel:  Lab Results   Component Value Date/Time     (L) 02/07/2023 03:33 AM    K 5.1 02/07/2023 03:33 AM    CL 92 (L) 02/07/2023 03:33 AM    CO2 30 (H) 02/07/2023 03:33 AM    BUN 17 02/07/2023 03:33 AM    CREATININE 0.7 02/07/2023 03:33 AM    CALCIUM 9.0 02/07/2023 03:33 AM       Hepatic Panel:  Lab Results   Component Value Date/Time    AST 36 02/06/2023 01:15 PM    ALT 23 02/06/2023 01:15 PM    ALKPHOS 116 02/06/2023 01:15 PM    BILITOT 1.4 (H) 02/06/2023 01:15 PM    ALBUMIN 1.7 (L) 02/06/2023 01:15 PM       Coagulation Panel:  Lab Results   Component Value Date/Time    INR 1.2 07/21/2022 04:28 AM       Imaging Studies:  XR Chest (2/7/23):  The cardiomediastinal silhouette is prominent, similar to the previous exam noting calcification of the aorta.  Left chest wall pacer noted.  There is no pleural effusion.  The trachea is midline.  The lungs are symmetrically expanded bilaterally with coarse interstitial attenuation bilaterally..  No large focal consolidation seen.  There is no pneumothorax.  The osseous structures are remarkable for degenerative changes and osteopenia.  There is free air beneath the right hemidiaphragm, correlation with recent surgical procedure advised.    Assessment:  Odette Hart is a 67 y.o. year old female with history of HTN, CAD s/p PCI c/b HFrEF (EF 10%), MDD, debility, severe malnutrition, and open cholecystectomy and takedown of colovesical and colovaginal fistuli (two separate areas) with sigmoid colectomy, partial cecectomy and appendectomy, drainage of intra-abdominal abscess and end colostomy 5/9/2022 recently admitted 12/22/22 - 12/30/22 for new colocutaneous fistula admitted 1/32/23 with incarcerated colonic prolapse  from her fistula s/p manual reduction now s/p open transverse/descending colectomy with takedown of colocutaneous fistula and end colostomy + parastomal hernia repair + lysis of adhesions >60 minutes (Juliane) + debridement of skin/subcutaneos fat/fascia/muscle (Daniel) 2/7/23, doing well.    Plan:  - Advance to LRD, Entereg contraindicated in setting of recent narcotic use, cont HLIV  - Decr dPCA to 0.1 demand and resume oxy 5/10mg prn; cont multimodal oral analgesia with Tylenol/gabapentin  - Appreciate cardiology/neph/IM recs: resume Lasix 40 BID and losartan, cont Toprol XL and ASA81, cont to hold Plavix in immediate postoperative period  - LVX/SCD, ambulation/IS PPX  - PT/OT for postoperative mobilization/ambulation  - WOCN c/s for fresh end colostomy care and repeat teaching  - Dispo: ongoing, [facility]    Patient discussed with attending, Dr. Cardozo.    Pablo Ramirez MD  Colon and Rectal Surgery Fellow

## 2023-02-07 NOTE — ASSESSMENT & PLAN NOTE
Patient with chronic hyponatremia, poor prognostic indicator in the setting of heart failure. Initially improving with diuresis, however still 124 despite euvolemia on exam. On chart review, patient was previously given Tolvaptan last year with improvement and was supposed to be on the medication outpatient, however cost prohibited from taking.   - nephrology consulted and following with recommendation of not starting tolvaptan at this time  - sodium improving, now at baseline

## 2023-02-07 NOTE — SUBJECTIVE & OBJECTIVE
Interval History: NAEO, VSS toleraten procedure well.BP wel controlled. Sodium back at patient baseline. She is apprehensive will continuing current dose of lasix. No new or worsening symptoms today. Still appears euvolemic. Pain controlled with PCA pump    Review of Systems   Constitutional:  Negative for chills and fever.   HENT:  Negative for congestion, rhinorrhea and sore throat.    Respiratory:  Negative for cough, shortness of breath and wheezing.    Cardiovascular:  Negative for chest pain and palpitations.   Gastrointestinal:  Positive for nausea. Negative for abdominal pain and vomiting.   Endocrine: Negative for polydipsia.   Genitourinary:  Negative for difficulty urinating, dysuria and hematuria.   Skin:  Positive for wound (vertical abdominal wall excision, LQ colostomy).   Neurological:  Negative for dizziness, syncope, weakness, light-headedness and headaches.   Psychiatric/Behavioral:  Negative for agitation and confusion.    Objective:     Vital Signs (Most Recent):  Temp: 97.2 °F (36.2 °C) (02/07/23 0735)  Pulse: 74 (02/07/23 0735)  Resp: 18 (02/07/23 0735)  BP: (!) 106/57 (02/07/23 0735)  SpO2: 99 % (02/07/23 0735)   Vital Signs (24h Range):  Temp:  [97.2 °F (36.2 °C)-98.5 °F (36.9 °C)] 97.2 °F (36.2 °C)  Pulse:  [59-98] 74  Resp:  [17-51] 18  SpO2:  [93 %-100 %] 99 %  BP: ()/(49-76) 106/57     Weight: 56.2 kg (124 lb)  Body mass index is 21.97 kg/m².    Intake/Output Summary (Last 24 hours) at 2/7/2023 0808  Last data filed at 2/7/2023 0520  Gross per 24 hour   Intake 1500 ml   Output 625 ml   Net 875 ml      Physical Exam  Vitals and nursing note reviewed.   Constitutional:       General: She is not in acute distress.     Appearance: She is ill-appearing.   HENT:      Head: Normocephalic and atraumatic.      Right Ear: External ear normal.      Left Ear: External ear normal.      Nose: No congestion or rhinorrhea.   Eyes:      General:         Right eye: No discharge.         Left eye: No  discharge.      Extraocular Movements: Extraocular movements intact.      Pupils: Pupils are equal, round, and reactive to light.   Cardiovascular:      Rate and Rhythm: Normal rate and regular rhythm.      Heart sounds: Normal heart sounds. No murmur heard.  Pulmonary:      Effort: No respiratory distress.      Breath sounds: Rales (bibasilar) present. No wheezing.   Abdominal:      General: There is no distension.      Comments: Colostomy, vertical/midline abdominal excision, well approximated CDI.   Musculoskeletal:         General: No tenderness.      Cervical back: Normal range of motion. No rigidity.      Right lower leg: No edema.      Left lower leg: No edema.   Skin:     Findings: Bruising present. No erythema.   Neurological:      General: No focal deficit present.      Mental Status: She is alert and oriented to person, place, and time.   Psychiatric:         Mood and Affect: Mood normal.         Behavior: Behavior normal.       Significant Labs: All pertinent labs within the past 24 hours have been reviewed.    Significant Imaging: I have reviewed all pertinent imaging results/findings within the past 24 hours.   Detail Level: Detailed

## 2023-02-07 NOTE — RESPIRATORY THERAPY
RAPID RESPONSE RESPIRATORY THERAPY ETCO2 CHECK         Time of visit: 936     Code Status: Full Code   : 1955  Bed: 1060/1060 A:   MRN: [id number]  Time spent at the bedside: < 15 min    SITUATION    Evaluated patient for: ETCo2 compliance    BACKGROUND    Why is the patient in the hospital?: Colocutaneous fistula    Patient has a past medical history of Acute coronary syndrome, Acute exacerbation of chronic obstructive pulmonary disease (COPD), Allergy, Arthritis, Cardiomyopathy, CHF (congestive heart failure), Coronary artery disease, Coronary artery disease of native artery of native heart with stable angina pectoris, Diverticulitis, Diverticulosis, Familial hypercholesterolemia, Former smoker, Heart attack, Heart disease, History of myocardial infarction, Hyperlipidemia, Hypertension, Hypertension, ICD (implantable cardioverter-defibrillator) in place, and Non-ST elevation myocardial infarction (NSTEMI).    24 Hours Vitals Range:  Temp:  [97.2 °F (36.2 °C)-98.5 °F (36.9 °C)]   Pulse:  [59-98]   Resp:  [17-51]   BP: ()/(49-76)   SpO2:  [93 %-100 %]     Labs:    Recent Labs     23  0231 23  1315 23  0333   * 127* 131*   K 3.6 4.1 5.1   CL 81* 90* 92*   CO2 34* 25 30*   CREATININE 0.7 0.7 0.7   GLU 98 179* 123*   PHOS 2.9 3.7 4.6*   MG 2.2 1.9 1.9        No results for input(s): PH, PCO2, PO2, HCO3, POCSATURATED, BE in the last 72 hours.    ASSESSMENT/INTERVENTIONS      Last VS   Temp: 97.2 °F (36.2 °C) (735)  Pulse: 74 (735)  Resp: 18 (938)  BP: 106/57 (735)  SpO2: 99 % (735)    Level of Consciousness: Level of Consciousness (AVPU): alert  Respiratory Effort: Respiratory Effort: Normal, Unlabored Expansion/Accessory Muscle Usage: Expansion/Accessory Muscles/Retractions: no use of accessory muscles  All Lung Field Breath Sounds:    Is the ETCO2 monitor on? Yes  Is the patient wearing a cannula? Yes  Are ETCO2 orders placed? Yes  Is the patient  on a PCA pump? Yes  ETCO2 monitored: ETCO2 (mmHg): 33 mmHg  Ambu at bedside: Ambu bag with the patient?: Yes, Adult Ambu    Active Orders   Respiratory Care    END TIDAL CO2 MONITOR Q12H     Frequency: Q12H     Number of Occurrences: Until Specified    Incentive spirometry     Frequency: Q4H     Number of Occurrences: Until Specified     Order Comments: Q4 while awake until discharge.  Educate patient in use; Keep incentive spirometer within reach; and Document incentive spirometer volume every 4 hours while awake.    ((10 sets per hour (3-5 inspirations per set)) on original order)      Oxygen Continuous     Frequency: Continuous     Number of Occurrences: Until Specified     Order Comments: Discontinue when Sp02 is greater than or equal to 95% of equal to Preop Sp02       Order Questions:      Device type: Low flow      Device: Simple Face Mask      Titrate O2 per Oxygen Titration Protocol: Yes      Notify MD of: Inability to achieve desired SpO2    Oxygen Continuous     Frequency: Continuous     Number of Occurrences: Until Specified     Order Questions:      Device type: Low flow      Device: Nasal Cannula (1- 5 Liters)      LPM: 2      Titrate O2 per Oxygen Titration Protocol: Yes      To maintain SpO2 goal of: >= 90%      Notify MD of: Inability to achieve desired SpO2; Sudden change in patient status and requires 20% increase in FiO2; Patient requires >60% FiO2    Pulse Oximetry Q Shift     Frequency: Q Shift     Number of Occurrences: Until Specified       RECOMMENDATIONS    We recommend: RRT Recs: Continue POC per primary team.      FOLLOW-UP    Please call back the Rapid Response RT, Saray Sanchez RRT at x [phone] for any questions or concerns.

## 2023-02-07 NOTE — SUBJECTIVE & OBJECTIVE
Interval History: assessed standing in hallway. No acute events, is on PCA pump    Review of patient's allergies indicates:   Allergen Reactions    Augmentin [amoxicillin-pot clavulanate] Swelling     Not allergic to amoxicillin just a derivative of augmentin    Lisinopril Other (See Comments)     Fluid around heart    Shellfish containing products Anaphylaxis     Pt.states she is allergic to SEAFOOD since the age of 13    Levaquin [levofloxacin] Other (See Comments)     Very bad joint pain    Clindamycin Palpitations     Chest pain     Current Facility-Administered Medications   Medication Frequency    acetaminophen tablet 1,000 mg Q8H    albuterol inhaler 2 puff Q6H PRN    ALPRAZolam tablet 0.5 mg Nightly PRN    aspirin EC tablet 81 mg Daily    balsam peru-castor oiL Oint BID    dextrose 10% bolus 125 mL 125 mL PRN    dextrose 10% bolus 250 mL 250 mL PRN    ezetimibe tablet 10 mg Daily    famotidine tablet 20 mg Daily    fluticasone propionate 50 mcg/actuation nasal spray 50 mcg Daily PRN    furosemide tablet 80 mg BID    gabapentin capsule 300 mg TID    HYDROmorphone PCA syringe 6 mg/30 mL (0.2 mg/mL) NS Continuous    losartan tablet 25 mg Daily    metoprolol succinate (TOPROL-XL) 24 hr split tablet 12.5 mg Daily    mupirocin 2 % ointment BID    naloxone 0.4 mg/mL injection 0.02 mg PRN    nitroGLYCERIN SL tablet 0.4 mg Q5 Min PRN    ondansetron injection 4 mg Q6H PRN    oxyCODONE immediate release tablet 5 mg Q4H PRN    oxyCODONE immediate release tablet Tab 10 mg Q4H PRN    polyethylene glycol packet 17 g Daily    prochlorperazine injection Soln 5 mg Q6H PRN    sodium chloride 0.9% flush 10 mL PRN    sodium chloride 0.9% flush 10 mL PRN       Objective:     Vital Signs (Most Recent):  Temp: 97.2 °F (36.2 °C) (02/07/23 0735)  Pulse: 74 (02/07/23 0735)  Resp: 18 (02/07/23 0938)  BP: (!) 106/57 (02/07/23 0735)  SpO2: 99 % (02/07/23 0735)   Vital Signs (24h Range):  Temp:  [97.2 °F (36.2 °C)-98.5 °F (36.9 °C)] 97.2  °F (36.2 °C)  Pulse:  [59-98] 74  Resp:  [17-51] 18  SpO2:  [93 %-100 %] 99 %  BP: ()/(49-76) 106/57     Weight: 56.2 kg (124 lb) (01/31/23 1512)  Body mass index is 21.97 kg/m².  Body surface area is 1.58 meters squared.    I/O last 3 completed shifts:  In: 1600 [IV Piggyback:1600]  Out: 625 [Urine:625]    Physical Exam  Constitutional:       General: She is not in acute distress.     Appearance: She is not ill-appearing or toxic-appearing.   HENT:      Head: Normocephalic.      Nose: Nose normal. No congestion.      Mouth/Throat:      Mouth: Mucous membranes are moist.      Pharynx: No oropharyngeal exudate or posterior oropharyngeal erythema.   Eyes:      General:         Right eye: No discharge.         Left eye: No discharge.      Pupils: Pupils are equal, round, and reactive to light.   Cardiovascular:      Rate and Rhythm: Normal rate.      Heart sounds: No murmur heard.  Pulmonary:      Effort: Pulmonary effort is normal. No respiratory distress.   Musculoskeletal:      Cervical back: Normal range of motion.      Right lower leg: No edema.      Left lower leg: No edema.   Skin:     General: Skin is warm.   Neurological:      Mental Status: She is alert.       Significant Labs:  All labs within the past 24 hours have been reviewed.     Significant Imaging:  Labs: Reviewed

## 2023-02-07 NOTE — PT/OT/SLP RE-EVAL
"Physical Therapy Co Re-evaluation    Patient Name:  Odette Hart   MRN:  [id number]    Recommendations:     Discharge Recommendations: home health PT  Discharge Equipment Recommendations: none   Barriers to discharge: None    Assessment:     Odette Hart is a 67 y.o. female admitted with a medical diagnosis of Colocutaneous fistula.  She presents with the following impairments/functional limitations: weakness, impaired endurance, impaired functional mobility, gait instability, impaired balance. Pt was receptive to physical therapy re-evaluation. Pt was hesitant to amb due to thinking quinones was not attached. Nurse was notified and assured that quinones was properly inserted and she was clear to amb. Due to current limitations pt would continue to benefit from acute skilled physical therapy services to improve functional mobility before d/c home..    Rehab Prognosis:  good; patient would benefit from acute skilled PT services to address these deficits and reach maximum level of function.      Recent Surgery: Procedure(s) (LRB):  REVISION, COLOSTOMY (N/A)  INSERTION, CATHETER, URETER, BILATERAL (Left)  CYSTOSCOPY, WITH URETERAL STENT INSERTION (Right)  DEBRIDEMENT, ABDOMEN (N/A)  COLECTOMY, PARTIAL  LYSIS, ADHESIONS (N/A) 1 Day Post-Op    Plan:     During this hospitalization, patient to be seen 3 x/week to address the above listed problems via gait training, therapeutic activities, therapeutic exercises  Plan of Care Expires:  03/09/23  Plan of Care Reviewed with: patient    Subjective     Communicated with nurse prior to session.  Patient found right sidelying with peripheral IV, PCA, quinones catheter, arterial line upon PT entry to room, agreeable to evaluation.      Chief Complaint: Quinones not placed right  Patient comments/goals: "I don't want to walk if my quinones is not all the way in"  Pain/Comfort:  Pain Rating 1: 10/10 (pt did not appeat to be in acute distress)  Location - Orientation 1: " generalized  Location 1: abdomen  Pain Addressed 1: Reposition, Distraction  Pain Rating Post-Intervention 1: other (see comments) (pt did not rate)    Patients cultural, spiritual, Faith conflicts given the current situation: no      Objective:     Patient found with: peripheral IV, PCA, quinones catheter, arterial line     General Precautions: Standard, fall  Orthopedic Precautions: N/A  Braces: N/A  Respiratory Status: Room air    Exams:  RLE ROM: WFL  RLE Strength: WFL except Hip flexion 3+/5  LLE ROM: WFL  LLE Strength: WFL except Hip flexion 3+/5    Functional Mobility:  Bed Mobility:     Supine to Sit: stand by assistance  Sit to Supine: stand by assistance  Transfers:     Sit to Stand:  stand by assistance with rolling walker  Toilet Transfer: stand by assistance with  rolling walker  using  Stand Pivot  Gait: 10' to bathroom with CGA and no AD. 60' out in hallway with RW and CGA. Pt amb with decreased jan.  Balance: Static/dynamic sitting balance: good, pt able to sit EOB ~8 min with supervision for muscle testing and talking to doctors.  Static standing balance: good, pt able to stand ~ 5 mins with RW and supervision for conversing with doctors.  Dynamic standing balance: fair +    AM-PAC 6 CLICK MOBILITY  Total Score:22       Treatment and Education:   Discussed continued POC with pt who verbalized understanding. Educated pt on safety with mobility.    Patient left right sidelying with all lines intact and call button in reach.    GOALS:   Multidisciplinary Problems       Physical Therapy Goals          Problem: Physical Therapy    Goal Priority Disciplines Outcome Goal Variances Interventions   Physical Therapy Goal     PT, PT/OT Ongoing, Progressing     Description: Goals to be met by: 2/15/2023     Patient will increase functional independence with mobility by performin. Gait  x 250 feet with Supervision using LRAD.   2. Ascend/descend 4 stair with right Handrails Supervision using no  Assistive device.   3. Sit to Stand with Supervision using LRAD                       History:     Past Medical History:   Diagnosis Date    Acute coronary syndrome     Acute exacerbation of chronic obstructive pulmonary disease (COPD) 3/23/2022    Allergy     Arthritis     Cardiomyopathy     CHF (congestive heart failure)     Coronary artery disease     Coronary artery disease of native artery of native heart with stable angina pectoris 2021: [facility]: Cath: LAD: Proximal stent patent. LCX: Proximal 80%. LCX: Dominant. Moderate disease. LCX: HEVER 2.75 x 18 mm. Distal dissection. HEVER 2.75 x 22 mm.     Diverticulitis     Diverticulosis     Familial hypercholesterolemia 2022    Former smoker 2022    Heart attack     Heart disease     History of myocardial infarction 2022    Hyperlipidemia     Hypertension     Hypertension 2022    ICD (implantable cardioverter-defibrillator) in place     Non-ST elevation myocardial infarction (NSTEMI) 2019       Past Surgical History:   Procedure Laterality Date    APPENDECTOMY N/A 2022    Procedure: APPENDECTOMY;  Surgeon: Rafa Cardozo MD;  [facility];  Service: Colon and Rectal;  Laterality: N/A;    ATRIAL CARDIAC PACEMAKER INSERTION      CECECTOMY N/A 2022    Procedure: EXCISION, CECUM;  Surgeon: Rafa Cardozo MD;  [facility];  Service: Colon and Rectal;  Laterality: N/A;     SECTION      CHOLECYSTECTOMY N/A 2022    Procedure: CHOLECYSTECTOMY;  Surgeon: Doug Epstein MD;  [facility];  Service: General;  Laterality: N/A;    COLONOSCOPY N/A 2022    Procedure: COLONOSCOPY;  Surgeon: Rafa Cardozo MD;  Location: [facility] OR 2ND FLR;  Service: Colon and Rectal;  Laterality: N/A;    COLOSTOMY  2022    Procedure: CREATION, COLOSTOMY;  Surgeon: Rafa Cardozo MD;  [facility];  Service: Colon and Rectal;;    CORONARY STENT PLACEMENT       CYSTOSCOPY W/ URETERAL STENT PLACEMENT Right 2/6/2023    Procedure: CYSTOSCOPY, WITH URETERAL STENT INSERTION;  Surgeon: Felice Laureano MD;  [facility];  Service: Urology;  Laterality: Right;  6X24 stent    DEBRIDEMENT, ABDOMEN N/A 2/6/2023    Procedure: DEBRIDEMENT, ABDOMEN;  Surgeon: Lucio Sanchez DO;  [facility];  Service: Plastics;  Laterality: N/A;  abdominal wall, and fascia.    INSERTION OF URETERAL CATHETER Left 2/6/2023    Procedure: INSERTION, CATHETER, URETER, BILATERAL;  Surgeon: Felice Laureano MD;  [facility];  Service: Urology;  Laterality: Left;    LEFT HEART CATHETERIZATION Left 1/24/2022    Procedure: CATHETERIZATION, HEART, LEFT;  Surgeon: Yohannes Cordova MD;  Location: [facility] CATH LAB;  Service: Cardiology;  Laterality: Left;    LYSIS OF ADHESIONS N/A 2/6/2023    Procedure: LYSIS, ADHESIONS;  Surgeon: Rafa Cardozo MD;  [facility];  Service: Colon and Rectal;  Laterality: N/A;    REVISION COLOSTOMY N/A 2/6/2023    Procedure: REVISION, COLOSTOMY;  Surgeon: Rafa Cardozo MD;  [facility];  Service: Colon and Rectal;  Laterality: N/A;    SUBTOTAL COLECTOMY  2/6/2023    Procedure: COLECTOMY, PARTIAL;  Surgeon: Rafa Cardozo MD;  [facility];  Service: Colon and Rectal;;       Time Tracking:     PT Received On: 02/07/23  PT Start Time: 0932     PT Stop Time: 1007  PT Total Time (min): 35 min     Billable Minutes: Re-eval 15 and Gait Training 20 02/07/2023

## 2023-02-07 NOTE — NURSING
Patient AAOx4, VSS, pain controlled with PCA pump, midline surgical incision clean dry and intact, ileostomy site intyact without gas or elfego at this time, quinones maintained, tolerating diet, IV sites clean dry and intact, bed low and locked, side rails up x2, bed alarm activated, call light and personal belongings in reach.

## 2023-02-07 NOTE — PROGRESS NOTES
[facility] Medicine  Progress Note    Patient Name: Odette Hart  MRN: [id number]  Patient Class: IP- Inpatient   Admission Date: 1/31/2023  Length of Stay: 7 days  Attending Physician: Rafa Cardozo MD  Primary Care Provider: Braxton Barragan MD        Subjective:     Principal Problem:Colocutaneous fistula        HPI:  68 yo F with HTN, HLD (statin intolerance), DM, CAD s/p PCI to LAD (2012)/LCX (1/2022), HFrEF (EF10%), debility, severe malnutrition, and the following procedures performed 5/2022: cholecystectomy, takedown of colovesical and colovaginal fistula, sigmoid colectomy, partial cecectomy, end colostomy who presents for colocutaneous fistula repair with abdominal wall reconstruction. On admission she was found to be hyponatremic and in acute on chronic decompensated heart failure. Cardiology was consulted to manage her HF and have since signed off as her volume status has become more euvolemic following diuresis. Medicine was consulted for pr-eop evaluation and continued management of her hyponatremia. Per chart review patient is chronically, mildly hyponatremic at baseline. She takes Lasix, Toprol, spironolactone, and losartan for her HFrEF. She reports correct use of her home medications. She states her diet consists of peanut butter and crackers. Denies CP/SOB/nausea vomiting, cough fever chills, polydipsia. Her Na has been improving with diuresis. Patient has multiple risk factors that complicate surgery: acute on chronic decompensated HF, CAD with PCIx2, hypertension, hyperlipidemia, malnutrition. This being said she does not have reported/documented/observed dysfunction of liver, kidney, lung, clotting.    On examination, she is chronically ill appearing/in no acute distress/at mental baseline. AF, /58 (78), sat 98% on RA, no leukocytosis, hgb 11.8, Hct 38.5, Plts 237, moderate Hyponatremia Na 124 (initially 122 on admission) with labile Baseline, K 3.1, Cl 85, Bun 13,  Cr 00.8, Ca 7.9, albumin 2-3. BNP 2600, UOP 2300 on lasix 80mg    CXR with mild pulmonary congestion      Overview/Hospital Course:  Admitted for planned colocutaneous fistula repair, found to be hyponatremic and in ADHF. Started on IV diuresis, >6 L UOP, returned to euvolemia. Urine/serum studies consistent with hypervolemic hyponatremia most likely in 2/2 to acute on chronic HF. She does have multiple factors that maker her high risk for surgery, primarily her severrely reduced LVEF 10%. Cardiology and medicine consulted for pre-operative evaluation. Agree she is high risk, but need of her procedure outweigh the risks. 2/6 Patient tolerated colocutaneous takedown/reconstruction well. Her Hyponatremia has resolved with Lasix 80mg BID. Patient expressed apprehension with continuing this diuretic dosage.      Interval History: NAEO, VSS toleraten procedure well.BP wel controlled. Sodium back at patient baseline. She is apprehensive will continuing current dose of lasix. No new or worsening symptoms today. Still appears euvolemic. Pain controlled with PCA pump. Started home dose lasix 40mg daily.    Review of Systems   Constitutional:  Negative for chills and fever.   HENT:  Negative for congestion, rhinorrhea and sore throat.    Respiratory:  Negative for cough, shortness of breath and wheezing.    Cardiovascular:  Negative for chest pain and palpitations.   Gastrointestinal:  Positive for nausea. Negative for abdominal pain and vomiting.   Endocrine: Negative for polydipsia.   Genitourinary:  Negative for difficulty urinating, dysuria and hematuria.   Skin:  Positive for wound (vertical abdominal wall excision, LQ colostomy).   Neurological:  Negative for dizziness, syncope, weakness, light-headedness and headaches.   Psychiatric/Behavioral:  Negative for agitation and confusion.    Objective:     Vital Signs (Most Recent):  Temp: 97.2 °F (36.2 °C) (02/07/23 0735)  Pulse: 74 (02/07/23 0735)  Resp: 18 (02/07/23  0735)  BP: (!) 106/57 (02/07/23 0735)  SpO2: 99 % (02/07/23 0735)   Vital Signs (24h Range):  Temp:  [97.2 °F (36.2 °C)-98.5 °F (36.9 °C)] 97.2 °F (36.2 °C)  Pulse:  [59-98] 74  Resp:  [17-51] 18  SpO2:  [93 %-100 %] 99 %  BP: ()/(49-76) 106/57     Weight: 56.2 kg (124 lb)  Body mass index is 21.97 kg/m².    Intake/Output Summary (Last 24 hours) at 2/7/2023 0808  Last data filed at 2/7/2023 0520  Gross per 24 hour   Intake 1500 ml   Output 625 ml   Net 875 ml      Physical Exam  Vitals and nursing note reviewed.   Constitutional:       General: She is not in acute distress.     Appearance: She is ill-appearing.   HENT:      Head: Normocephalic and atraumatic.      Right Ear: External ear normal.      Left Ear: External ear normal.      Nose: No congestion or rhinorrhea.   Eyes:      General:         Right eye: No discharge.         Left eye: No discharge.      Extraocular Movements: Extraocular movements intact.      Pupils: Pupils are equal, round, and reactive to light.   Cardiovascular:      Rate and Rhythm: Normal rate and regular rhythm.      Heart sounds: Normal heart sounds. No murmur heard.  Pulmonary:      Effort: No respiratory distress.      Breath sounds: Rales (bibasilar) present. No wheezing.   Abdominal:      General: There is no distension.      Comments: Colostomy, vertical/midline abdominal excision, well approximated CDI.   Musculoskeletal:         General: No tenderness.      Cervical back: Normal range of motion. No rigidity.      Right lower leg: No edema.      Left lower leg: No edema.   Skin:     Findings: Bruising present. No erythema.   Neurological:      General: No focal deficit present.      Mental Status: She is alert and oriented to person, place, and time.   Psychiatric:         Mood and Affect: Mood normal.         Behavior: Behavior normal.       Significant Labs: All pertinent labs within the past 24 hours have been reviewed.    Significant Imaging: I have reviewed all  pertinent imaging results/findings within the past 24 hours.      Assessment/Plan:      * Colocutaneous fistula  - managed by CRS  S/p revision, tolerated well    Ischemic cardiomyopathy  Cardiology consulted for management of ADHF, now signed off  Recent echo showed EF of 10%  Patient hypervolemic on presentation, appears more euvolemic today  Home GDMT: Toprol-XL 25, losartan 25 mg.  Initial CXR without new detrimental changes since prior, BNP 2600 on arrival    Recommendations:   - diuresis per cardiology/nephrology  - replete Mag>2, K>4  - continue Toprol-XL 12.5 per cardiology recommendations  - losartan 25mg qd, decreased to 12.5 mg qd given hypotension  - continue losartan 12.5mg daily per cardiology req    Coronary artery disease  History of Cad with PCIx2 LAD in 2012 and LCX 1/2022  Home regimen: DAPT    - continue aspirin  - holding clopidogrel for pending procedure    Hypertension  Pressures are well maintained    -Continue Losartan 12.5 mg daily and Metoprolol 12.5 mg daily    Hypercholesterolemia  Lipids well controlled  Has documented statin intolerance    - continue ezetimibe    Hyponatremia  Chronic mild hyponatremia on chart review, now moderate on presentation  Found to be in ADHF on arrival  BNP 2600, volume overloaded, JVD present, bilateral LE edema  CXR with mild pulmonary congestion  Has since had adequate urine output on Lasix 80mg IV BID, with some improvement of Na  Reports poor diet at home (crackers/peanutbutter)  Reduced albumin likely 2/2 to malnutrition    Urine/serum studies consistent with hypervolemic hyponatremia  Multifactorial, hypervolemic + low solute intake    - strict I/Os  - 1000cc fluid restriction  - Na base at patient baseline      VTE Risk Mitigation (From admission, onward)         Ordered     Place sequential compression device  Until discontinued         02/06/23 1447     IP VTE HIGH RISK PATIENT  Once         02/06/23 1447                Discharge Planning   SIVAN:  2/7/2023     Code Status: Full Code   Is the patient medically ready for discharge?:     Reason for patient still in hospital (select all that apply): Treatment, PT / OT recommendations and Pending disposition  Discharge Plan A: Home Health            Fahad Smith MD  Department of Hospital Medicine   [facility]

## 2023-02-07 NOTE — PROGRESS NOTES
"[facility]  Nephrology  Progress Note    Patient Name: Odette Hart  MRN: [id number]  Admission Date: 1/31/2023  Hospital Length of Stay: 7 days  Attending Provider: Rafa Cardozo MD   Primary Care Physician: Braxton Barragan MD  Principal Problem:Colocutaneous fistula    Subjective:     HPI: Odette Hart is a 67 y.o. female w/ known HTN, Hyperlipidemia, HFrEF (10%), s/p ICD placement (2014), MI s/ stent to LAD (2012), C HEVER x2 in LCx (1/2022), RBB, colovesicular fistula with chronic thickenned GB s/p choecystectomy, sigmoid colectomy and end colostomy (5/2022) complicated by new colocutaneous fistula and incarcerated colonic prolase (1/2023), and anemia admitted on 1/31/2023 for evaluation and management of worsening colocutaneous fistula that was no longer able to be reduced with dusky discoloration and pt experiencing nausea and vomiting.     Pt states that, she has been following with her cardiologist closely and that he had started her on lasix 80mg BID for her heart failure however she self-adjusted the dose to 40mg BID and then to only 40mg daily d/t feeling very thirsty and "dehydrated", for the past 3 months, she has been taking only 40mg of lasix daily. In addition, pt reports not having very good appetite and difficulty with eating d/t not having her posterior teeth, she can chew meat and was on Boost protein shakes, crackers, peanut butter and jelly sandwiches, , 1 Sprite soda per day. She recalls having hyponatremia and was started on tolvaptan in the hosptial however she didn't take tolvaptan when discharged d/t pt not being able to afford tolvaptan $5,000/month supply, her Na corrected only on diuretics.     Initial labs were notable for: sNa 122, K 4.4, bicarb 24, BUN 25, sCr 0.9, alk phos 200, albumin 2.0, t. Tan 2, AST/ALT 51/46, BNP 2604. Hematology labs showed WBC 3.78, hgb 11.8, plts 237  A CXR showed stable pulmonary markings, no pleural effusions, and known cardiomegaly "   On 2/1 she received metoprolol 25mg-->12.5mg BID since 2/2,   2/2 Phos 2.4-->1.6-->3 on 2/4 with replacement  Pt has also received Mg & K replacements     Lasix 80mg IV Q12H (2/1, 2/2), 80mg PO x1 on 2/3, 80mg PO on 2/4  Losartan 25mg on 2/3  2/3 sOsm: 274  2/3 Urine labs: Osm 460, Na 16  In the last 24 hrs her I/Os are: 50ml/2.3L of urine and 100mls of stool         Interval History: assessed standing in hallway. No acute events, is on PCA pump    Review of patient's allergies indicates:   Allergen Reactions    Augmentin [amoxicillin-pot clavulanate] Swelling     Not allergic to amoxicillin just a derivative of augmentin    Lisinopril Other (See Comments)     Fluid around heart    Shellfish containing products Anaphylaxis     Pt.states she is allergic to SEAFOOD since the age of 13    Levaquin [levofloxacin] Other (See Comments)     Very bad joint pain    Clindamycin Palpitations     Chest pain     Current Facility-Administered Medications   Medication Frequency    acetaminophen tablet 1,000 mg Q8H    albuterol inhaler 2 puff Q6H PRN    ALPRAZolam tablet 0.5 mg Nightly PRN    aspirin EC tablet 81 mg Daily    balsam peru-castor oiL Oint BID    dextrose 10% bolus 125 mL 125 mL PRN    dextrose 10% bolus 250 mL 250 mL PRN    ezetimibe tablet 10 mg Daily    famotidine tablet 20 mg Daily    fluticasone propionate 50 mcg/actuation nasal spray 50 mcg Daily PRN    furosemide tablet 80 mg BID    gabapentin capsule 300 mg TID    HYDROmorphone PCA syringe 6 mg/30 mL (0.2 mg/mL) NS Continuous    losartan tablet 25 mg Daily    metoprolol succinate (TOPROL-XL) 24 hr split tablet 12.5 mg Daily    mupirocin 2 % ointment BID    naloxone 0.4 mg/mL injection 0.02 mg PRN    nitroGLYCERIN SL tablet 0.4 mg Q5 Min PRN    ondansetron injection 4 mg Q6H PRN    oxyCODONE immediate release tablet 5 mg Q4H PRN    oxyCODONE immediate release tablet Tab 10 mg Q4H PRN    polyethylene glycol packet 17 g Daily     prochlorperazine injection Soln 5 mg Q6H PRN    sodium chloride 0.9% flush 10 mL PRN    sodium chloride 0.9% flush 10 mL PRN       Objective:     Vital Signs (Most Recent):  Temp: 97.2 °F (36.2 °C) (02/07/23 0735)  Pulse: 74 (02/07/23 0735)  Resp: 18 (02/07/23 0938)  BP: (!) 106/57 (02/07/23 0735)  SpO2: 99 % (02/07/23 0735)   Vital Signs (24h Range):  Temp:  [97.2 °F (36.2 °C)-98.5 °F (36.9 °C)] 97.2 °F (36.2 °C)  Pulse:  [59-98] 74  Resp:  [17-51] 18  SpO2:  [93 %-100 %] 99 %  BP: ()/(49-76) 106/57     Weight: 56.2 kg (124 lb) (01/31/23 1512)  Body mass index is 21.97 kg/m².  Body surface area is 1.58 meters squared.    I/O last 3 completed shifts:  In: 1600 [IV Piggyback:1600]  Out: 625 [Urine:625]    Physical Exam  Constitutional:       General: She is not in acute distress.     Appearance: She is not ill-appearing or toxic-appearing.   HENT:      Head: Normocephalic.      Nose: Nose normal. No congestion.      Mouth/Throat:      Mouth: Mucous membranes are moist.      Pharynx: No oropharyngeal exudate or posterior oropharyngeal erythema.   Eyes:      General:         Right eye: No discharge.         Left eye: No discharge.      Pupils: Pupils are equal, round, and reactive to light.   Cardiovascular:      Rate and Rhythm: Normal rate.      Heart sounds: No murmur heard.  Pulmonary:      Effort: Pulmonary effort is normal. No respiratory distress.   Musculoskeletal:      Cervical back: Normal range of motion.      Right lower leg: No edema.      Left lower leg: No edema.   Skin:     General: Skin is warm.   Neurological:      Mental Status: She is alert.       Significant Labs:  All labs within the past 24 hours have been reviewed.     Significant Imaging:  Labs: Reviewed    Assessment/Plan:     * Colocutaneous fistula  -Plan per primary team       Hyponatremia  Serum sodium continues to improve  Likely etiology low effective circulating volume due to EF of 10%, may have component of low solute  Serum  osm 267 --> 276  Urine Osm 460 --> 239    Plan:  -treat heart failure (GDMT per cardiology)  -resume lasix 40 PO daily   -serum sodium at her normal and asymptomatic at this time  -recommend nutrition consult    Ischemic cardiomyopathy  -Plan per primary team           Thank you for your consult. I will follow-up with patient. Please contact us if you have any additional questions.    Wilber Dalal MD  Nephrology  [facility]

## 2023-02-07 NOTE — ASSESSMENT & PLAN NOTE
Acute on chronic combined systolic and diastolic heart failure  Recent echo showed EF of 10%  Patient appears volume overloaded on examination  Currently on Toprol-XL 12.5, losartan 25 mg.  CXR without new detrimental changes since prior, BNP 2600     Plan  - Recommend restarting lasix 80po bid   - Replete Mag>2, K>4  - Continue Toprol-XL 12.5  - Restart Losartan when cleared by surgical team  - Please schedule follow-up with cardiologist as outpatient

## 2023-02-07 NOTE — ASSESSMENT & PLAN NOTE
Chronic mild hyponatremia on chart review, now moderate on presentation  Found to be in ADHF on arrival  BNP 2600, volume overloaded, JVD present, bilateral LE edema  CXR with mild pulmonary congestion  Has since had adequate urine output on Lasix 80mg IV BID, with some improvement of Na  Reports poor diet at home (crackers/peanutbutter)  Reduced albumin likely 2/2 to malnutrition    Urine/serum studies consistent with hypervolemic hyponatremia  Multifactorial, hypervolemic + low solute intake    - strict I/Os  - 1000cc fluid restriction  - Na base at patient baseline

## 2023-02-07 NOTE — PLAN OF CARE
Problem: Physical Therapy  Goal: Physical Therapy Goal  Description: Goals to be met by: 2/15/2023     Patient will increase functional independence with mobility by performin. Gait  x 250 feet with Supervision using LRAD.   2. Ascend/descend 4 stair with right Handrails Supervision using no Assistive device.   3. Sit to Stand with Supervision using LRAD  Outcome: Ongoing, Progressing

## 2023-02-07 NOTE — RESPIRATORY THERAPY
RAPID RESPONSE RESPIRATORY THERAPY ETCO2 CHECK         Time of visit:      Code Status: Full Code   : 1955  Bed: 1060/1060 A:   MRN: [id number]  Time spent at the bedside: < 15 min    SITUATION    Evaluated patient for: ETCo2 compliance    BACKGROUND    Why is the patient in the hospital?: Colocutaneous fistula    Patient has a past medical history of Acute coronary syndrome, Acute exacerbation of chronic obstructive pulmonary disease (COPD), Allergy, Arthritis, Cardiomyopathy, CHF (congestive heart failure), Coronary artery disease, Coronary artery disease of native artery of native heart with stable angina pectoris, Diverticulitis, Diverticulosis, Familial hypercholesterolemia, Former smoker, Heart attack, Heart disease, History of myocardial infarction, Hyperlipidemia, Hypertension, Hypertension, ICD (implantable cardioverter-defibrillator) in place, and Non-ST elevation myocardial infarction (NSTEMI).    24 Hours Vitals Range:  Temp:  [97.3 °F (36.3 °C)-98.1 °F (36.7 °C)]   Pulse:  [66-98]   Resp:  [16-51]   BP: ()/(49-76)   SpO2:  [93 %-100 %]     Labs:    Recent Labs     23  0447 23  1245 23  1401 23  0231 23  1315   * 125* 126* 126* 127*   K 3.3* 3.3* 3.3* 3.6 4.1   CL 81* 82* 81* 81* 90*   CO2 31* 31* 33* 34* 25   CREATININE 0.7 0.7 0.7 0.7 0.7    101 91 98 179*   PHOS 2.6* 3.2  --  2.9 3.7   MG 1.4*  --   --  2.2 1.9        No results for input(s): PH, PCO2, PO2, HCO3, POCSATURATED, BE in the last 72 hours.    ASSESSMENT/INTERVENTIONS      Last VS   Temp: 97.3 °F (36.3 °C) (1606)  Pulse: 75 (1606)  Resp: 17 (1732)  BP: 93/52 (1606)  SpO2: 94 % (1732)    Level of Consciousness: Level of Consciousness (AVPU): alert  Respiratory Effort: Respiratory Effort: Normal, Unlabored Expansion/Accessory Muscle Usage:    All Lung Field Breath Sounds:    Is the ETCO2 monitor on? Yes  Is the patient wearing a cannula? Yes  Are ETCO2  orders placed? Yes  Is the patient on a PCA pump? Yes  ETCO2 monitored: ETCO2 (mmHg): 31 mmHg  Ambu at bedside: Ambu bag with the patient?: Yes, Adult Ambu    Active Orders   Respiratory Care    END TIDAL CO2 MONITOR Q12H     Frequency: Q12H     Number of Occurrences: Until Specified    Incentive spirometry     Frequency: Q4H     Number of Occurrences: Until Specified     Order Comments: Q4 while awake until discharge.  Educate patient in use; Keep incentive spirometer within reach; and Document incentive spirometer volume every 4 hours while awake.    ((10 sets per hour (3-5 inspirations per set)) on original order)      Oxygen Continuous     Frequency: Continuous     Number of Occurrences: Until Specified     Order Comments: Discontinue when Sp02 is greater than or equal to 95% of equal to Preop Sp02       Order Questions:      Device type: Low flow      Device: Simple Face Mask      Titrate O2 per Oxygen Titration Protocol: Yes      Notify MD of: Inability to achieve desired SpO2    Oxygen Continuous     Frequency: Continuous     Number of Occurrences: Until Specified     Order Questions:      Device type: Low flow      Device: Nasal Cannula (1- 5 Liters)      LPM: 2      Titrate O2 per Oxygen Titration Protocol: Yes      To maintain SpO2 goal of: >= 90%      Notify MD of: Inability to achieve desired SpO2; Sudden change in patient status and requires 20% increase in FiO2; Patient requires >60% FiO2    Pulse Oximetry Q Shift     Frequency: Q Shift     Number of Occurrences: Until Specified       RECOMMENDATIONS    We recommend: RRT Recs: Continue POC per primary team.      FOLLOW-UP    Please call back the Rapid Response RTR[facility]ys RRT at x [phone] for any questions or concerns.

## 2023-02-07 NOTE — PROGRESS NOTES
Losartan held due to bp perfect serve with smita manzo with cardiology said hold bp sys 100-110 before admin requested he add to medication order

## 2023-02-07 NOTE — SUBJECTIVE & OBJECTIVE
Interval History: POD1, had fistula revision done yesterday. Tolerated well, with PCA pump today. Sodium improving as well.     Review of Systems   Constitutional: Negative for chills and fever.   HENT:  Negative for congestion and sore throat.    Eyes:  Negative for blurred vision and double vision.   Cardiovascular:  Negative for chest pain and palpitations.   Respiratory:  Negative for cough and sputum production.    Endocrine: Negative for polydipsia and polyphagia.   Musculoskeletal:  Negative for back pain and myalgias.        Post surgical pain   Gastrointestinal:  Negative for bloating, diarrhea, nausea and vomiting.   Genitourinary:  Negative for flank pain, frequency and hematuria.   Neurological:  Negative for numbness and weakness.   Psychiatric/Behavioral:  Negative for altered mental status. The patient is not nervous/anxious.    Objective:     Vital Signs (Most Recent):  Temp: 97 °F (36.1 °C) (02/07/23 1145)  Pulse: 65 (02/07/23 1145)  Resp: 18 (02/07/23 1145)  BP: (!) 99/48 (02/07/23 1145)  SpO2: 96 % (02/07/23 1145)   Vital Signs (24h Range):  Temp:  [97 °F (36.1 °C)-98.5 °F (36.9 °C)] 97 °F (36.1 °C)  Pulse:  [59-98] 65  Resp:  [17-51] 18  SpO2:  [93 %-100 %] 96 %  BP: ()/(48-76) 99/48     Weight: 56.2 kg (124 lb)  Body mass index is 21.97 kg/m².     SpO2: 96 %         Intake/Output Summary (Last 24 hours) at 2/7/2023 1146  Last data filed at 2/7/2023 0520  Gross per 24 hour   Intake --   Output 600 ml   Net -600 ml       Lines/Drains/Airways       Drain  Duration                  Ileostomy 02/06/23 1118 Standard (Marion, jaelyn) LUQ 1 day         Ureteral Drain/Stent 02/06/23 0813 Left ureter 5 Fr. 1 day         Urethral Catheter 02/06/23 0815 Non-latex 16 Fr. 1 day              Arterial Line  Duration             Arterial Line 02/06/23 0800 Right Radial 1 day              Peripheral Intravenous Line  Duration                  Peripheral IV - Single Lumen 18 G Right Wrist -- days          Peripheral IV - Single Lumen 02/03/23 1604 18 G;1 3/4 in Left;Anterior Upper Arm 3 days                    Physical Exam  Constitutional:       Comments: Appears comfortable, sitting at bedside. PCA pump in place   HENT:      Head: Normocephalic.      Nose: Nose normal.      Mouth/Throat:      Mouth: Mucous membranes are moist.   Eyes:      Pupils: Pupils are equal, round, and reactive to light.   Cardiovascular:      Rate and Rhythm: Normal rate.      Comments: No jvd appreciated  Pulmonary:      Effort: Pulmonary effort is normal. No respiratory distress.   Abdominal:      General: Abdomen is flat.   Musculoskeletal:         General: Normal range of motion.      Cervical back: Normal range of motion.      Right lower leg: No edema.      Left lower leg: No edema.   Skin:     General: Skin is warm.      Capillary Refill: Capillary refill takes less than 2 seconds.   Neurological:      General: No focal deficit present.      Mental Status: She is oriented to person, place, and time.   Psychiatric:         Mood and Affect: Mood normal.       Significant Labs: All pertinent lab results from the last 24 hours have been reviewed.    Significant Imaging:  No new imaging

## 2023-02-07 NOTE — ASSESSMENT & PLAN NOTE
Serum sodium continues to improve  Likely etiology low effective circulating volume due to EF of 10%, may have component of low solute  Serum osm 267 --> 276  Urine Osm 460 --> 239    Plan:  -treat heart failure (GDMT per cardiology)  -resume lasix 40 PO daily   -serum sodium at her normal and asymptomatic at this time  -recommend nutrition consult

## 2023-02-07 NOTE — PROGRESS NOTES
[facility]  Cardiology  Progress Note    Patient Name: Odette Hart  MRN: [id number]  Admission Date: 1/31/2023  Hospital Length of Stay: 7 days  Code Status: Full Code   Attending Physician: Rafa Cardozo MD   Primary Care Physician: Braxton Barragan MD  Expected Discharge Date: 2/7/2023  Principal Problem:Colocutaneous fistula    Subjective:     Hospital Course:   No notes on file    Interval History: POD1, had fistula revision done yesterday. Tolerated well, with PCA pump today. Sodium improving as well.     Review of Systems   Constitutional: Negative for chills and fever.   HENT:  Negative for congestion and sore throat.    Eyes:  Negative for blurred vision and double vision.   Cardiovascular:  Negative for chest pain and palpitations.   Respiratory:  Negative for cough and sputum production.    Endocrine: Negative for polydipsia and polyphagia.   Musculoskeletal:  Negative for back pain and myalgias.        Post surgical pain   Gastrointestinal:  Negative for bloating, diarrhea, nausea and vomiting.   Genitourinary:  Negative for flank pain, frequency and hematuria.   Neurological:  Negative for numbness and weakness.   Psychiatric/Behavioral:  Negative for altered mental status. The patient is not nervous/anxious.    Objective:     Vital Signs (Most Recent):  Temp: 97 °F (36.1 °C) (02/07/23 1145)  Pulse: 65 (02/07/23 1145)  Resp: 18 (02/07/23 1145)  BP: (!) 99/48 (02/07/23 1145)  SpO2: 96 % (02/07/23 1145)   Vital Signs (24h Range):  Temp:  [97 °F (36.1 °C)-98.5 °F (36.9 °C)] 97 °F (36.1 °C)  Pulse:  [59-98] 65  Resp:  [17-51] 18  SpO2:  [93 %-100 %] 96 %  BP: ()/(48-76) 99/48     Weight: 56.2 kg (124 lb)  Body mass index is 21.97 kg/m².     SpO2: 96 %         Intake/Output Summary (Last 24 hours) at 2/7/2023 1146  Last data filed at 2/7/2023 0520  Gross per 24 hour   Intake --   Output 600 ml   Net -600 ml       Lines/Drains/Airways       Drain  Duration                  Ileostomy  02/06/23 1118 Standard (Marion, end) LUQ 1 day         Ureteral Drain/Stent 02/06/23 0813 Left ureter 5 Fr. 1 day         Urethral Catheter 02/06/23 0815 Non-latex 16 Fr. 1 day              Arterial Line  Duration             Arterial Line 02/06/23 0800 Right Radial 1 day              Peripheral Intravenous Line  Duration                  Peripheral IV - Single Lumen 18 G Right Wrist -- days         Peripheral IV - Single Lumen 02/03/23 1604 18 G;1 3/4 in Left;Anterior Upper Arm 3 days                    Physical Exam  Constitutional:       Comments: Appears comfortable, sitting at bedside. PCA pump in place   HENT:      Head: Normocephalic.      Nose: Nose normal.      Mouth/Throat:      Mouth: Mucous membranes are moist.   Eyes:      Pupils: Pupils are equal, round, and reactive to light.   Cardiovascular:      Rate and Rhythm: Normal rate.      Comments: No jvd appreciated  Pulmonary:      Effort: Pulmonary effort is normal. No respiratory distress.   Abdominal:      General: Abdomen is flat.   Musculoskeletal:         General: Normal range of motion.      Cervical back: Normal range of motion.      Right lower leg: No edema.      Left lower leg: No edema.   Skin:     General: Skin is warm.      Capillary Refill: Capillary refill takes less than 2 seconds.   Neurological:      General: No focal deficit present.      Mental Status: She is oriented to person, place, and time.   Psychiatric:         Mood and Affect: Mood normal.       Significant Labs: All pertinent lab results from the last 24 hours have been reviewed.    Significant Imaging:  No new imaging    Assessment and Plan:       Hyponatremia  Patient with chronic hyponatremia, poor prognostic indicator in the setting of heart failure. Initially improving with diuresis, however still 124 despite euvolemia on exam. On chart review, patient was previously given Tolvaptan last year with improvement and was supposed to be on the medication outpatient, however  cost prohibited from taking.   - nephrology consulted and following with recommendation of not starting tolvaptan at this time  - sodium improving, now at baseline    Coronary artery disease  History of anterior wall MI with LAD stent in 2012 and circumflex stent in January 2022.  Continue ASA, Lipitor  Ezetimibe  Plavix on hold for surgery   Restart ASA as soon as possible if held for surgery      Ischemic cardiomyopathy  Acute on chronic combined systolic and diastolic heart failure  Recent echo showed EF of 10%  Patient appears volume overloaded on examination  Currently on Toprol-XL 12.5, losartan 25 mg.  CXR without new detrimental changes since prior, BNP 2600     Plan  - Recommend restarting lasix 80po bid   - Replete Mag>2, K>4  - Continue Toprol-XL 12.5  - Restart Losartan when cleared by surgical team  - Please schedule follow-up with cardiologist as outpatient        VTE Risk Mitigation (From admission, onward)         Ordered     Place sequential compression device  Until discontinued         02/06/23 1447     IP VTE HIGH RISK PATIENT  Once         02/06/23 1447              Cardiology will sign off, please contact with further questions.     Regi Tapia, DO  Cardiology  [facility]

## 2023-02-07 NOTE — PLAN OF CARE
Problem: Occupational Therapy  Goal: Occupational Therapy Goal  Description: Goals to be met by: 2/28/2023    Patient will increase functional independence with ADLs by performing:    UE Dressing with Set-up Assistance.  LE Dressing with Setup.  Grooming while standing at sink with Modified [place].  Toileting from toilet with Modified [place] for hygiene and clothing management.   Supine to sit with Modified [place].  Stand pivot transfers with Modified [place].  Toilet transfer to toilet with Modified [place].    Outcome: Ongoing, Progressing   Patient's goals are set.

## 2023-02-07 NOTE — PT/OT/SLP EVAL
Occupational Therapy   Evaluation/Treatment    Name: Odette Hart  MRN: [id number]  Admitting Diagnosis: Colocutaneous fistula  Recent Surgery: Procedure(s) (LRB):  REVISION, COLOSTOMY (N/A)  INSERTION, CATHETER, URETER, BILATERAL (Left)  CYSTOSCOPY, WITH URETERAL STENT INSERTION (Right)  DEBRIDEMENT, ABDOMEN (N/A)  COLECTOMY, PARTIAL  LYSIS, ADHESIONS (N/A) 1 Day Post-Op    Recommendations:     Discharge Recommendations: home health OT  Discharge Equipment Recommendations:  none  Barriers to discharge:  Decreased caregiver support    Assessment:     Odette Hart is a 67 y.o. female with a medical diagnosis of Colocutaneous fistula. Performance deficits affecting function: weakness, impaired endurance, impaired self care skills, impaired functional mobility, gait instability, impaired balance, pain. Patient tolerated evaluation, but anxious and concerned quinones catheter came out, but Nurse also assessed it and reported it was in place with patient needing reassurance and the ok to get OOB to participate with therapy. Patient would benefit from continued skilled acute OT 3x/wk to improve functional mobility, increase independence with ADLs, and address established goals. Recommending HHOT once medically appropriate for discharge to increase maximal independence, reduce burden of care, and ensure safety.     Rehab Prognosis: Good; patient would benefit from acute skilled OT services to address these deficits and reach maximum level of function.       Plan:     Patient to be seen 3 x/week to address the above listed problems via self-care/home management, therapeutic activities, therapeutic exercises  Plan of Care Expires: 03/07/23  Plan of Care Reviewed with: patient    Subjective     Chief Complaint: pain and concerned quinones catheter was not in place  Patient/Family Comments/goals: to get better    Occupational Profile:  Living Environment: Patient lives alone in mobile home with 4 SKYLER with R HR.  Prior to  admission, patients level of function was independent.  Equipment used at home: walker, rolling, rollator, bedside commode.  Upon discharge, patient will have assistance from son.    Pain/Comfort:  Pain Rating 1: 10/10  Location - Side 1: Bilateral  Location - Orientation 1: generalized  Location 1: abdomen  Pain Addressed 1: Reposition, Distraction  Pain Rating Post-Intervention 1:  (patient did not rate)    Patients cultural, spiritual, Adventism conflicts given the current situation: no    Objective:     Communicated with: NSG prior to session.  Patient found right sidelying with peripheral IV, PCA, arterial line, quinones catheter upon OT entry to room.    General Precautions: Standard, fall  Orthopedic Precautions: N/A  Braces: N/A  Respiratory Status: Room air    Occupational Performance:    Bed Mobility:    Patient completed Supine to Sit with stand by assistance  Patient completed Sit to Supine with stand by assistance    Functional Mobility/Transfers:  Patient completed Sit <> Stand Transfer with CGA with  no assistive device and SBA<>CGA with rolling walker   Patient completed Toilet Transfer bed>toilet with functional ambulation technique (with CGA and no AD and then was given a RW for increased stability, but needed CGA for safety). Patient also ambulated in hallway prior to sitting on toilet. Toilet>bed with functional ambulation with RW and CGA.    Activities of Daily Living:  Upper Body Dressing: minimum assistance Donning back gown  Lower Body Dressing: moderate assistance [place] and donning socks   Toileting: total assistance due to quinones catheter     Cognitive/Visual Perceptual:  Cognitive/Psychosocial Skills:     -       Oriented to: Person, Place, Time, and Situation   -       Follows Commands/attention:Follows multistep  commands  -       Communication: clear/fluent  -       Memory: No Deficits noted  -       Safety awareness/insight to disability: intact   -       Mood/Affect/Coping  skills/emotional control: Appropriate to situation  Visual/Perceptual:      -Intact      Physical Exam:  Upper Extremity Range of Motion:     -       Right Upper Extremity: WFL  -       Left Upper Extremity: WFL  Upper Extremity Strength:    -       Right Upper Extremity: WFL  -       Left Upper Extremity: WFL   Strength:    -       Right Upper Extremity: WFL  -       Left Upper Extremity: WFL  Fine Motor Coordination:    -       Intact  Gross motor coordination:   WFL    AMPAC 6 Click ADL:  AMPAC Total Score: 19    Treatment & Education:  Role of OT and POC  ADL retraining  Functional mobility training  Safety  Discharge planning  Importance of EOB/OOB activity    This therapist obtained a BSC for patient to use while in room with education regarding calling for assistance for safety.     Co-treatment performed due to patient's multiple deficits requiring two skilled therapists to appropriately and safely assess patient's strength and endurance while facilitating functional tasks in addition to accommodating for patient's activity tolerance.     Patient left right sidelying with all lines intact, call button in reach, bed alarm on, nurse notified, and all needs met.    GOALS:   Multidisciplinary Problems       Occupational Therapy Goals          Problem: Occupational Therapy    Goal Priority Disciplines Outcome Interventions   Occupational Therapy Goal     OT, PT/OT Ongoing, Progressing    Description: Goals to be met by: 2/28/2023    Patient will increase functional independence with ADLs by performing:    UE Dressing with Set-up Assistance.  LE Dressing with Setup.  Grooming while standing at sink with Modified [place].  Toileting from toilet with Modified [place] for hygiene and clothing management.   Supine to sit with Modified [place].  Stand pivot transfers with Modified [place].  Toilet transfer to toilet with Modified [place].                         History:     Past Medical  History:   Diagnosis Date    Acute coronary syndrome     Acute exacerbation of chronic obstructive pulmonary disease (COPD) 3/23/2022    Allergy     Arthritis     Cardiomyopathy     CHF (congestive heart failure)     Coronary artery disease     Coronary artery disease of native artery of native heart with stable angina pectoris 2021: [facility]: Cath: LAD: Proximal stent patent. LCX: Proximal 80%. LCX: Dominant. Moderate disease. LCX: HEVER 2.75 x 18 mm. Distal dissection. HEVER 2.75 x 22 mm.     Diverticulitis     Diverticulosis     Familial hypercholesterolemia 2022    Former smoker 2022    Heart attack     Heart disease     History of myocardial infarction 2022    Hyperlipidemia     Hypertension     Hypertension 2022    ICD (implantable cardioverter-defibrillator) in place     Non-ST elevation myocardial infarction (NSTEMI) 2019         Past Surgical History:   Procedure Laterality Date    APPENDECTOMY N/A 2022    Procedure: APPENDECTOMY;  Surgeon: Rafa Cardozo MD;  [facility];  Service: Colon and Rectal;  Laterality: N/A;    ATRIAL CARDIAC PACEMAKER INSERTION      CECECTOMY N/A 2022    Procedure: EXCISION, CECUM;  Surgeon: Rafa Cardozo MD;  Location: [facility] OR 2ND FLR;  Service: Colon and Rectal;  Laterality: N/A;     SECTION      CHOLECYSTECTOMY N/A 2022    Procedure: CHOLECYSTECTOMY;  Surgeon: Doug Epstein MD;  [facility];  Service: General;  Laterality: N/A;    COLONOSCOPY N/A 2022    Procedure: COLONOSCOPY;  Surgeon: Rafa Cardozo MD;  Location: [facility] OR 2ND FLR;  Service: Colon and Rectal;  Laterality: N/A;    COLOSTOMY  2022    Procedure: CREATION, COLOSTOMY;  Surgeon: Rafa Cardozo MD;  Location: [facility] OR 2ND FLR;  Service: Colon and Rectal;;    CORONARY STENT PLACEMENT      CYSTOSCOPY W/ URETERAL STENT PLACEMENT Right 2023    Procedure: CYSTOSCOPY, WITH URETERAL STENT INSERTION;   Surgeon: Felice Laureano MD;  Location: [facility] OR [facility] FLR;  Service: Urology;  Laterality: Right;  6X24 stent    DEBRIDEMENT, ABDOMEN N/A 2/6/2023    Procedure: DEBRIDEMENT, ABDOMEN;  Surgeon: Lucio Sanchez DO;  [facility];  Service: Plastics;  Laterality: N/A;  abdominal wall, and fascia.    INSERTION OF URETERAL CATHETER Left 2/6/2023    Procedure: INSERTION, CATHETER, URETER, BILATERAL;  Surgeon: Felice Laureano MD;  [facility];  Service: Urology;  Laterality: Left;    LEFT HEART CATHETERIZATION Left 1/24/2022    Procedure: CATHETERIZATION, HEART, LEFT;  Surgeon: Yohannes Cordova MD;  Location: [facility] CATH LAB;  Service: Cardiology;  Laterality: Left;    LYSIS OF ADHESIONS N/A 2/6/2023    Procedure: LYSIS, ADHESIONS;  Surgeon: Rafa Cardozo MD;  Location: [facility] OR [facility] FLR;  Service: Colon and Rectal;  Laterality: N/A;    REVISION COLOSTOMY N/A 2/6/2023    Procedure: REVISION, COLOSTOMY;  Surgeon: Rafa Cardozo MD;  [facility];  Service: Colon and Rectal;  Laterality: N/A;    SUBTOTAL COLECTOMY  2/6/2023    Procedure: COLECTOMY, PARTIAL;  Surgeon: Rafa Cardozo MD;  [facility];  Service: Colon and Rectal;;       Time Tracking:     OT Date of Treatment: 02/07/23  OT Start Time: 0932  OT Stop Time: 1010  OT Total Time (min): 38 min    Billable Minutes:Evaluation 15  Self Care/Home Management 23 2/7/2023

## 2023-02-07 NOTE — ASSESSMENT & PLAN NOTE
Cardiology consulted for management of ADHF, now signed off  Recent echo showed EF of 10%  Patient hypervolemic on presentation, appears more euvolemic today  Home GDMT: Toprol-XL 25, losartan 25 mg.  Initial CXR without new detrimental changes since prior, BNP 2600 on arrival    Recommendations:   - diuresis per cardiology/nephrology  - replete Mag>2, K>4  - continue Toprol-XL 12.5 per cardiology recommendations  - losartan 25mg qd, decreased to 12.5 mg qd given hypotension  - continue losartan 12.5mg daily per cardiology req

## 2023-02-08 NOTE — ASSESSMENT & PLAN NOTE
Cardiology consulted for management of ADHF, now signed off  Recent echo showed EF of 10%  Patient hypervolemic on presentation, appears more euvolemic today  Home GDMT: Toprol-XL 25, losartan 25 mg.  Initial CXR without new detrimental changes since prior, BNP 2600 on arrival    Good UOP on lasix 40 mg PO. Transitioned to IV due to new ileus per surgery.     Recommendations:   - diuresis per cardiology/nephrology  - replete Mag>2, K>4  - continue Toprol-XL 12.5 per cardiology recommendations  - losartan 25mg qd, decreased to 12.5 mg qd given hypotension  - continue losartan 12.5mg daily per cardiology req  - would avoid IV anti-hypertensives

## 2023-02-08 NOTE — PLAN OF CARE
Problem: Adult Inpatient Plan of Care  Goal: Plan of Care Review  Outcome: Ongoing, Progressing  Goal: Patient-Specific Goal (Individualized)  Outcome: Ongoing, Progressing  Goal: Absence of Hospital-Acquired Illness or Injury  Outcome: Ongoing, Progressing  Goal: Optimal Comfort and Wellbeing  Outcome: Ongoing, Progressing  Goal: Readiness for Transition of Care  Outcome: Ongoing, Progressing     Problem: Fall Injury Risk  Goal: Absence of Fall and Fall-Related Injury  Outcome: Ongoing, Progressing     Problem: Impaired Wound Healing  Goal: Optimal Wound Healing  Outcome: Ongoing, Progressing     Problem: Infection  Goal: Absence of Infection Signs and Symptoms  Outcome: Ongoing, Progressing     Problem: Skin Injury Risk Increased  Goal: Skin Health and Integrity  Outcome: Ongoing, Progressing

## 2023-02-08 NOTE — ASSESSMENT & PLAN NOTE
History of Cad with PCIx2 LAD in 2012 and LCX 1/2022  Home regimen: DAPT    No further surgical intervention planned inpatient by CRS or urology.     - continue aspirin  - restart clopidogrel

## 2023-02-08 NOTE — ASSESSMENT & PLAN NOTE
Chronic mild hyponatremia on chart review, now moderate on presentation  Found to be in ADHF on arrival  BNP 2600, volume overloaded, JVD present, bilateral LE edema  CXR with mild pulmonary congestion  Reports poor diet at home (crackers/peanutbutter)  Reduced albumin likely 2/2 to malnutrition    Urine/serum studies consistent with hypervolemic hyponatremia  Multifactorial, hypervolemic + low solute intake    Na 131, stable    - strict I/Os  - 1000cc fluid restriction  - Na back at patient baseline

## 2023-02-08 NOTE — RESPIRATORY THERAPY
RAPID RESPONSE RESPIRATORY THERAPY ETCO2 CHECK         Time of visit: 924     Code Status: Full Code   : 1955  Bed: 1060/1060 A:   MRN: [id number]  Time spent at the bedside: < 15 min    SITUATION    Evaluated patient for: ETCo2 compliance    BACKGROUND    Why is the patient in the hospital?: Colocutaneous fistula    Patient has a past medical history of Acute coronary syndrome, Acute exacerbation of chronic obstructive pulmonary disease (COPD), Allergy, Arthritis, Cardiomyopathy, CHF (congestive heart failure), Coronary artery disease, Coronary artery disease of native artery of native heart with stable angina pectoris, Diverticulitis, Diverticulosis, Familial hypercholesterolemia, Former smoker, Heart attack, Heart disease, History of myocardial infarction, Hyperlipidemia, Hypertension, Hypertension, ICD (implantable cardioverter-defibrillator) in place, and Non-ST elevation myocardial infarction (NSTEMI).    24 Hours Vitals Range:  Temp:  [97 °F (36.1 °C)-98.5 °F (36.9 °C)]   Pulse:  []   Resp:  [18-22]   BP: ()/(48-60)   SpO2:  [91 %-98 %]     Labs:    Recent Labs     23  1315 23  0333 23  1434 23  0227   * 131* 132* 131*   K 4.1 5.1 4.4 3.8   CL 90* 92* 92* 90*   CO2 25 30* 30* 28   CREATININE 0.7 0.7 0.7 0.6   * 123* 121* 73   PHOS 3.7 4.6*  --  3.2   MG 1.9 1.9  --  1.5*        No results for input(s): PH, PCO2, PO2, HCO3, POCSATURATED, BE in the last 72 hours.    ASSESSMENT/INTERVENTIONS      Last VS   Temp: 97.8 °F (36.6 °C) (821)  Pulse: 93 (821)  Resp: 19 (821)  BP: 94/50 (821)  SpO2: 91 % (821)    Level of Consciousness: Level of Consciousness (AVPU): alert  Respiratory Effort: Respiratory Effort: Normal, Unlabored Expansion/Accessory Muscle Usage: Expansion/Accessory Muscles/Retractions: no use of accessory muscles  All Lung Field Breath Sounds: All Lung Fields Breath Sounds: diminished, equal bilaterally  Is the  ETCO2 monitor on? Yes  Is the patient wearing a cannula? Yes  Are ETCO2 orders placed? Yes  Is the patient on a PCA pump? Yes  ETCO2 monitored: ETCO2 (mmHg): 45 mmHg  Ambu at bedside: Ambu bag with the patient?: Yes, Adult Ambu    Active Orders   Respiratory Care    END TIDAL CO2 MONITOR Q12H     Frequency: Q12H     Number of Occurrences: Until Specified    Incentive spirometry     Frequency: Q4H     Number of Occurrences: Until Specified     Order Comments: Q4 while awake until discharge.  Educate patient in use; Keep incentive spirometer within reach; and Document incentive spirometer volume every 4 hours while awake.    ((10 sets per hour (3-5 inspirations per set)) on original order)      Oxygen Continuous     Frequency: Continuous     Number of Occurrences: Until Specified     Order Comments: Discontinue when Sp02 is greater than or equal to 95% of equal to Preop Sp02       Order Questions:      Device type: Low flow      Device: Simple Face Mask      Titrate O2 per Oxygen Titration Protocol: Yes      Notify MD of: Inability to achieve desired SpO2    Oxygen Continuous     Frequency: Continuous     Number of Occurrences: Until Specified     Order Questions:      Device type: Low flow      Device: Nasal Cannula (1- 5 Liters)      LPM: 2      Titrate O2 per Oxygen Titration Protocol: Yes      To maintain SpO2 goal of: >= 90%      Notify MD of: Inability to achieve desired SpO2; Sudden change in patient status and requires 20% increase in FiO2; Patient requires >60% FiO2    Pulse Oximetry Q Shift     Frequency: Q Shift     Number of Occurrences: Until Specified       RECOMMENDATIONS    We recommend: RRT Recs: Continue POC per primary team.      FOLLOW-UP    Please call back the Rapid Response RT, Donell Courtney, RRT at x [phone] for any questions or concerns.

## 2023-02-08 NOTE — NURSING
"    NEURO: Limited movement r/t pain, otherwise intact.  AO X4    CV: Pacemaker/ICD combo, "15 beat of v tac noted" tele monitor, BP 90s/50s map 69 today.    RESP: WDL, diminished clear lung sounds global    GI: LUQ stoma constructed that hasn't produced gas or stool.  Global bowel sounds are active., sips and chips permitted only    : continent, voids in bedpan or BSC, yellow U/O    SKIN/WOUND/DRESSING: midline inc. W/ island dressing CDI,  beefy red stoma noted    EQUIPMENT: PCA pump: Dil. 0.2 demand, 6 min. Lockout, 2mg max    LINES/GTTS: R Wr 20- NS 10cc/hr. SNEHA 18-SL           "

## 2023-02-08 NOTE — PROGRESS NOTES
"[facility]  Nephrology  Progress Note    Patient Name: Odette Hart  MRN: [id number]  Admission Date: 1/31/2023  Hospital Length of Stay: 8 days  Attending Provider: Rafa Cardozo MD   Primary Care Physician: Braxton Barragan MD  Principal Problem:Colocutaneous fistula    Subjective:     HPI: Odette Hart is a 67 y.o. female w/ known HTN, Hyperlipidemia, HFrEF (10%), s/p ICD placement (2014), MI s/ stent to LAD (2012), C HEVER x2 in LCx (1/2022), RBB, colovesicular fistula with chronic thickenned GB s/p choecystectomy, sigmoid colectomy and end colostomy (5/2022) complicated by new colocutaneous fistula and incarcerated colonic prolase (1/2023), and anemia admitted on 1/31/2023 for evaluation and management of worsening colocutaneous fistula that was no longer able to be reduced with dusky discoloration and pt experiencing nausea and vomiting.     Pt states that, she has been following with her cardiologist closely and that he had started her on lasix 80mg BID for her heart failure however she self-adjusted the dose to 40mg BID and then to only 40mg daily d/t feeling very thirsty and "dehydrated", for the past 3 months, she has been taking only 40mg of lasix daily. In addition, pt reports not having very good appetite and difficulty with eating d/t not having her posterior teeth, she can chew meat and was on Boost protein shakes, crackers, peanut butter and jelly sandwiches, , 1 Sprite soda per day. She recalls having hyponatremia and was started on tolvaptan in the hosptial however she didn't take tolvaptan when discharged d/t pt not being able to afford tolvaptan $5,000/month supply, her Na corrected only on diuretics.     Initial labs were notable for: sNa 122, K 4.4, bicarb 24, BUN 25, sCr 0.9, alk phos 200, albumin 2.0, t. Tan 2, AST/ALT 51/46, BNP 2604. Hematology labs showed WBC 3.78, hgb 11.8, plts 237  A CXR showed stable pulmonary markings, no pleural effusions, and known cardiomegaly "   On 2/1 she received metoprolol 25mg-->12.5mg BID since 2/2,   2/2 Phos 2.4-->1.6-->3 on 2/4 with replacement  Pt has also received Mg & K replacements     Lasix 80mg IV Q12H (2/1, 2/2), 80mg PO x1 on 2/3, 80mg PO on 2/4  Losartan 25mg on 2/3  2/3 sOsm: 274  2/3 Urine labs: Osm 460, Na 16  In the last 24 hrs her I/Os are: 50ml/2.3L of urine and 100mls of stool         Interval History: no acute events overnight, sodium levels stable    Review of patient's allergies indicates:   Allergen Reactions    Augmentin [amoxicillin-pot clavulanate] Swelling     Not allergic to amoxicillin just a derivative of augmentin    Lisinopril Other (See Comments)     Fluid around heart    Shellfish containing products Anaphylaxis     Pt.states she is allergic to SEAFOOD since the age of 13    Levaquin [levofloxacin] Other (See Comments)     Very bad joint pain    Clindamycin Palpitations     Chest pain     Current Facility-Administered Medications   Medication Frequency    acetaminophen tablet 1,000 mg Q8H    albuterol inhaler 2 puff Q6H PRN    ALPRAZolam tablet 0.5 mg Nightly PRN    aspirin EC tablet 81 mg Daily    balsam peru-castor oiL Oint BID    dextrose 10% bolus 125 mL 125 mL PRN    dextrose 10% bolus 250 mL 250 mL PRN    enoxaparin injection 40 mg Daily    ezetimibe tablet 10 mg Daily    famotidine tablet 20 mg Daily    fluticasone propionate 50 mcg/actuation nasal spray 50 mcg Daily PRN    furosemide injection 40 mg Q12H    HYDROmorphone PCA syringe 6 mg/30 mL (0.2 mg/mL) NS Continuous    losartan split tablet 12.5 mg Daily    magnesium sulfate 2g in water 50mL IVPB (premix) Q2H    metoprolol succinate (TOPROL-XL) 24 hr split tablet 12.5 mg Daily    mupirocin 2 % ointment BID    naloxone 0.4 mg/mL injection 0.02 mg PRN    nitroGLYCERIN SL tablet 0.4 mg Q5 Min PRN    ondansetron injection 4 mg Q6H PRN    prochlorperazine injection Soln 5 mg Q6H PRN    sodium chloride 0.9% flush 10 mL PRN    sodium  chloride 0.9% flush 10 mL PRN       Objective:     Vital Signs (Most Recent):  Temp: 97.8 °F (36.6 °C) (02/08/23 0821)  Pulse: 93 (02/08/23 0821)  Resp: 19 (02/08/23 0821)  BP: (!) 94/50 (02/08/23 0821)  SpO2: (!) 91 % (02/08/23 0821)   Vital Signs (24h Range):  Temp:  [97 °F (36.1 °C)-98.5 °F (36.9 °C)] 97.8 °F (36.6 °C)  Pulse:  [] 93  Resp:  [18-22] 19  SpO2:  [91 %-98 %] 91 %  BP: ()/(48-60) 94/50     Weight: 56.2 kg (124 lb) (01/31/23 1512)  Body mass index is 21.97 kg/m².  Body surface area is 1.58 meters squared.    I/O last 3 completed shifts:  In: -   Out: 2000 [Urine:2000]    Physical Exam  Constitutional:       General: She is not in acute distress.     Appearance: She is not ill-appearing or toxic-appearing.   HENT:      Head: Normocephalic.      Nose: Nose normal. No congestion.      Mouth/Throat:      Mouth: Mucous membranes are moist.      Pharynx: No oropharyngeal exudate or posterior oropharyngeal erythema.   Eyes:      General:         Right eye: No discharge.         Left eye: No discharge.      Pupils: Pupils are equal, round, and reactive to light.   Cardiovascular:      Rate and Rhythm: Normal rate.      Heart sounds: No murmur heard.  Pulmonary:      Effort: Pulmonary effort is normal. No respiratory distress.   Musculoskeletal:      Cervical back: Normal range of motion.      Right lower leg: No edema.      Left lower leg: No edema.   Skin:     General: Skin is warm.   Neurological:      Mental Status: She is alert.       Significant Labs:  All labs within the past 24 hours have been reviewed.     Significant Imaging:  Labs: Reviewed    Assessment/Plan:     * Colocutaneous fistula  -Plan per primary team       Hyponatremia  Serum sodium continues to improve  Likely etiology low effective circulating volume due to EF of 10%, may have component of low solute  Serum osm 267 --> 276  Urine Osm 460 --> 239    Plan:  -treat heart failure (GDMT per cardiology)  -continue lasix 40  PO  -serum sodium at her normal and asymptomatic at this time  -recommend nutrition consult    Ischemic cardiomyopathy  -Plan per primary team           Thank you for your consult. I will sign off. Please contact us if you have any additional questions.    Wilber Dalal MD  Nephrology  [facility]

## 2023-02-08 NOTE — PROGRESS NOTES
[facility] Medicine  Progress Note    Patient Name: Odette Hart  MRN: [id number]  Patient Class: IP- Inpatient   Admission Date: 1/31/2023  Length of Stay: 8 days  Attending Physician: Rafa Cardozo MD  Primary Care Provider: Braxton Barragan MD        Subjective:     Principal Problem:Colocutaneous fistula    HPI:  68 yo F with HTN, HLD (statin intolerance), DM, CAD s/p PCI to LAD (2012)/LCX (1/2022), HFrEF (EF10%), debility, severe malnutrition, and the following procedures performed 5/2022: cholecystectomy, takedown of colovesical and colovaginal fistula, sigmoid colectomy, partial cecectomy, end colostomy who presents for colocutaneous fistula repair with abdominal wall reconstruction. On admission she was found to be hyponatremic and in acute on chronic decompensated heart failure. Cardiology was consulted to manage her HF and have since signed off as her volume status has become more euvolemic following diuresis. Medicine was consulted for pr-eop evaluation and continued management of her hyponatremia. Per chart review patient is chronically, mildly hyponatremic at baseline. She takes Lasix, Toprol, spironolactone, and losartan for her HFrEF. She reports correct use of her home medications. She states her diet consists of peanut butter and crackers. Denies CP/SOB/nausea vomiting, cough fever chills, polydipsia. Her Na has been improving with diuresis. Patient has multiple risk factors that complicate surgery: acute on chronic decompensated HF, CAD with PCIx2, hypertension, hyperlipidemia, malnutrition. This being said she does not have reported/documented/observed dysfunction of liver, kidney, lung, clotting.    On examination, she is chronically ill appearing/in no acute distress/at mental baseline. AF, /58 (78), sat 98% on RA, no leukocytosis, hgb 11.8, Hct 38.5, Plts 237, moderate Hyponatremia Na 124 (initially 122 on admission) with labile Baseline, K 3.1, Cl 85, Bun 13, Cr  00.8, Ca 7.9, albumin 2-3. BNP 2600, UOP 2300 on lasix 80mg    CXR with mild pulmonary congestion      Overview/Hospital Course:  Admitted for planned colocutaneous fistula repair, found to be hyponatremic and in ADHF. Started on IV diuresis, >6 L UOP, returned to euvolemia. Urine/serum studies consistent with hypervolemic hyponatremia most likely in 2/2 to acute on chronic HF. She does have multiple factors that maker her high risk for surgery, primarily her severrely reduced LVEF 10%. Cardiology and medicine consulted for pre-operative evaluation. Agree she is high risk, but need of her procedure outweigh the risks. 2/6 Patient tolerated colocutaneous takedown/reconstruction well. Her Hyponatremia has resolved with Lasix 80mg BID. Patient expressed apprehension with continuing this diuretic dosage.      Interval History:   Increased abdominal pain after eating food, CRS concerned for ileus, now NPO. UOP 1.4L on lasix 40 mg PO. Na 131, stable. On PCA pump.     Review of Systems   Constitutional:  Negative for chills and fever.   HENT:  Negative for congestion, rhinorrhea and sore throat.    Respiratory:  Negative for cough, shortness of breath and wheezing.    Cardiovascular:  Negative for chest pain and palpitations.   Gastrointestinal:  Positive for abdominal pain and nausea. Negative for vomiting.   Endocrine: Negative for polydipsia.   Genitourinary:  Negative for difficulty urinating, dysuria and hematuria.   Neurological:  Negative for dizziness, syncope, weakness, light-headedness and headaches.   Psychiatric/Behavioral:  Negative for agitation and confusion.    Objective:     Vital Signs (Most Recent):  Temp: 97.8 °F (36.6 °C) (02/08/23 0821)  Pulse: 88 (02/08/23 1110)  Resp: 19 (02/08/23 0821)  BP: (!) 94/50 (02/08/23 0821)  SpO2: (!) 91 % (02/08/23 0821)   Vital Signs (24h Range):  Temp:  [97 °F (36.1 °C)-98.5 °F (36.9 °C)] 97.8 °F (36.6 °C)  Pulse:  [] 88  Resp:  [18-22] 19  SpO2:  [91 %-98 %] 91  %  BP: ()/(48-60) 94/50     Weight: 56.2 kg (124 lb)  Body mass index is 21.97 kg/m².    Intake/Output Summary (Last 24 hours) at 2/8/2023 1115  Last data filed at 2/8/2023 0800  Gross per 24 hour   Intake --   Output 1400 ml   Net -1400 ml        Physical Exam  Vitals and nursing note reviewed.   Constitutional:       General: She is not in acute distress.     Appearance: She is ill-appearing.   HENT:      Head: Normocephalic and atraumatic.      Right Ear: External ear normal.      Left Ear: External ear normal.      Nose: No congestion or rhinorrhea.   Eyes:      General:         Right eye: No discharge.         Left eye: No discharge.      Extraocular Movements: Extraocular movements intact.      Pupils: Pupils are equal, round, and reactive to light.   Cardiovascular:      Rate and Rhythm: Normal rate and regular rhythm.      Heart sounds: Normal heart sounds. No murmur heard.  Pulmonary:      Effort: No respiratory distress.      Breath sounds: Rales (bibasilar) present. No wheezing.   Abdominal:      General: There is no distension.      Comments: Colostomy, vertical/midline abdominal excision, well approximated CDI.   Musculoskeletal:         General: No tenderness.      Cervical back: Normal range of motion. No rigidity.      Right lower leg: No edema.      Left lower leg: No edema.   Skin:     Findings: Bruising present. No erythema.   Neurological:      General: No focal deficit present.      Mental Status: She is alert and oriented to person, place, and time.   Psychiatric:         Mood and Affect: Mood normal.         Behavior: Behavior normal.       Significant Labs: All pertinent labs within the past 24 hours have been reviewed.    Significant Imaging: I have reviewed all pertinent imaging results/findings within the past 24 hours.      Assessment/Plan:      * Colocutaneous fistula  S/p revision, tolerated well  - managed by CRS    Ischemic cardiomyopathy  Cardiology consulted for management of  ADHF, now signed off  Recent echo showed EF of 10%  Patient hypervolemic on presentation, appears more euvolemic today  Home GDMT: Toprol-XL 25, losartan 25 mg.  Initial CXR without new detrimental changes since prior, BNP 2600 on arrival    Good UOP on lasix 40 mg PO. Transitioned to IV due to new ileus per surgery.     Recommendations:   - diuresis per cardiology/nephrology  - replete Mag>2, K>4  - continue Toprol-XL 12.5 per cardiology recommendations  - losartan 25mg qd, decreased to 12.5 mg qd given hypotension  - continue losartan 12.5mg daily per cardiology req  - would avoid IV anti-hypertensives    Coronary artery disease  History of Cad with PCIx2 LAD in 2012 and LCX 1/2022  Home regimen: DAPT    No further surgical intervention planned inpatient by CRS or urology.     - continue aspirin  - restart clopidogrel    Hyponatremia  Chronic mild hyponatremia on chart review, now moderate on presentation  Found to be in ADHF on arrival  BNP 2600, volume overloaded, JVD present, bilateral LE edema  CXR with mild pulmonary congestion  Reports poor diet at home (crackers/peanutbutter)  Reduced albumin likely 2/2 to malnutrition    Urine/serum studies consistent with hypervolemic hyponatremia  Multifactorial, hypervolemic + low solute intake    Na 131, stable    - strict I/Os  - 1000cc fluid restriction  - Na back at patient baseline    Hypertension  Pressures are well maintained    - continue Losartan 12.5 mg daily and Metoprolol 12.5 mg daily    Hypercholesterolemia  Lipids well controlled  Has documented statin intolerance    - continue ezetimibe    VTE Risk Mitigation (From admission, onward)         Ordered     enoxaparin injection 40 mg  Daily         02/07/23 1659     Place sequential compression device  Until discontinued         02/06/23 1447     IP VTE HIGH RISK PATIENT  Once         02/06/23 1447                Discharge Planning   SIVAN: 2/12/2023     Code Status: Full Code   Is the patient medically ready  for discharge?:     Reason for patient still in hospital (select all that apply): Treatment  Discharge Plan A: Home Health          Emma Quintanilla MD  Department of Hospital Medicine   [facility]

## 2023-02-08 NOTE — SUBJECTIVE & OBJECTIVE
Interval History: no acute events overnight, sodium levels stable    Review of patient's allergies indicates:   Allergen Reactions    Augmentin [amoxicillin-pot clavulanate] Swelling     Not allergic to amoxicillin just a derivative of augmentin    Lisinopril Other (See Comments)     Fluid around heart    Shellfish containing products Anaphylaxis     Pt.states she is allergic to SEAFOOD since the age of 13    Levaquin [levofloxacin] Other (See Comments)     Very bad joint pain    Clindamycin Palpitations     Chest pain     Current Facility-Administered Medications   Medication Frequency    acetaminophen tablet 1,000 mg Q8H    albuterol inhaler 2 puff Q6H PRN    ALPRAZolam tablet 0.5 mg Nightly PRN    aspirin EC tablet 81 mg Daily    balsam peru-castor oiL Oint BID    dextrose 10% bolus 125 mL 125 mL PRN    dextrose 10% bolus 250 mL 250 mL PRN    enoxaparin injection 40 mg Daily    ezetimibe tablet 10 mg Daily    famotidine tablet 20 mg Daily    fluticasone propionate 50 mcg/actuation nasal spray 50 mcg Daily PRN    furosemide injection 40 mg Q12H    HYDROmorphone PCA syringe 6 mg/30 mL (0.2 mg/mL) NS Continuous    losartan split tablet 12.5 mg Daily    magnesium sulfate 2g in water 50mL IVPB (premix) Q2H    metoprolol succinate (TOPROL-XL) 24 hr split tablet 12.5 mg Daily    mupirocin 2 % ointment BID    naloxone 0.4 mg/mL injection 0.02 mg PRN    nitroGLYCERIN SL tablet 0.4 mg Q5 Min PRN    ondansetron injection 4 mg Q6H PRN    prochlorperazine injection Soln 5 mg Q6H PRN    sodium chloride 0.9% flush 10 mL PRN    sodium chloride 0.9% flush 10 mL PRN       Objective:     Vital Signs (Most Recent):  Temp: 97.8 °F (36.6 °C) (02/08/23 0821)  Pulse: 93 (02/08/23 0821)  Resp: 19 (02/08/23 0821)  BP: (!) 94/50 (02/08/23 0821)  SpO2: (!) 91 % (02/08/23 0821)   Vital Signs (24h Range):  Temp:  [97 °F (36.1 °C)-98.5 °F (36.9 °C)] 97.8 °F (36.6 °C)  Pulse:  [] 93  Resp:  [18-22] 19  SpO2:  [91 %-98 %] 91 %  BP:  ()/(48-60) 94/50     Weight: 56.2 kg (124 lb) (01/31/23 1512)  Body mass index is 21.97 kg/m².  Body surface area is 1.58 meters squared.    I/O last 3 completed shifts:  In: -   Out: 2000 [Urine:2000]    Physical Exam  Constitutional:       General: She is not in acute distress.     Appearance: She is not ill-appearing or toxic-appearing.   HENT:      Head: Normocephalic.      Nose: Nose normal. No congestion.      Mouth/Throat:      Mouth: Mucous membranes are moist.      Pharynx: No oropharyngeal exudate or posterior oropharyngeal erythema.   Eyes:      General:         Right eye: No discharge.         Left eye: No discharge.      Pupils: Pupils are equal, round, and reactive to light.   Cardiovascular:      Rate and Rhythm: Normal rate.      Heart sounds: No murmur heard.  Pulmonary:      Effort: Pulmonary effort is normal. No respiratory distress.   Musculoskeletal:      Cervical back: Normal range of motion.      Right lower leg: No edema.      Left lower leg: No edema.   Skin:     General: Skin is warm.   Neurological:      Mental Status: She is alert.       Significant Labs:  All labs within the past 24 hours have been reviewed.     Significant Imaging:  Labs: Reviewed

## 2023-02-08 NOTE — SUBJECTIVE & OBJECTIVE
Interval History:   Increased abdominal pain after eating food, CRS concerned for ileus, now NPO. UOP 1.4L on lasix 40 mg PO. Na 131, stable. On PCA pump.     Review of Systems   Constitutional:  Negative for chills and fever.   HENT:  Negative for congestion, rhinorrhea and sore throat.    Respiratory:  Negative for cough, shortness of breath and wheezing.    Cardiovascular:  Negative for chest pain and palpitations.   Gastrointestinal:  Positive for abdominal pain and nausea. Negative for vomiting.   Endocrine: Negative for polydipsia.   Genitourinary:  Negative for difficulty urinating, dysuria and hematuria.   Neurological:  Negative for dizziness, syncope, weakness, light-headedness and headaches.   Psychiatric/Behavioral:  Negative for agitation and confusion.    Objective:     Vital Signs (Most Recent):  Temp: 97.8 °F (36.6 °C) (02/08/23 0821)  Pulse: 88 (02/08/23 1110)  Resp: 19 (02/08/23 0821)  BP: (!) 94/50 (02/08/23 0821)  SpO2: (!) 91 % (02/08/23 0821)   Vital Signs (24h Range):  Temp:  [97 °F (36.1 °C)-98.5 °F (36.9 °C)] 97.8 °F (36.6 °C)  Pulse:  [] 88  Resp:  [18-22] 19  SpO2:  [91 %-98 %] 91 %  BP: ()/(48-60) 94/50     Weight: 56.2 kg (124 lb)  Body mass index is 21.97 kg/m².    Intake/Output Summary (Last 24 hours) at 2/8/2023 1115  Last data filed at 2/8/2023 0800  Gross per 24 hour   Intake --   Output 1400 ml   Net -1400 ml        Physical Exam  Vitals and nursing note reviewed.   Constitutional:       General: She is not in acute distress.     Appearance: She is ill-appearing.   HENT:      Head: Normocephalic and atraumatic.      Right Ear: External ear normal.      Left Ear: External ear normal.      Nose: No congestion or rhinorrhea.   Eyes:      General:         Right eye: No discharge.         Left eye: No discharge.      Extraocular Movements: Extraocular movements intact.      Pupils: Pupils are equal, round, and reactive to light.   Cardiovascular:      Rate and Rhythm: Normal  rate and regular rhythm.      Heart sounds: Normal heart sounds. No murmur heard.  Pulmonary:      Effort: No respiratory distress.      Breath sounds: Rales (bibasilar) present. No wheezing.   Abdominal:      General: There is no distension.      Comments: Colostomy, vertical/midline abdominal excision, well approximated CDI.   Musculoskeletal:         General: No tenderness.      Cervical back: Normal range of motion. No rigidity.      Right lower leg: No edema.      Left lower leg: No edema.   Skin:     Findings: Bruising present. No erythema.   Neurological:      General: No focal deficit present.      Mental Status: She is alert and oriented to person, place, and time.   Psychiatric:         Mood and Affect: Mood normal.         Behavior: Behavior normal.       Significant Labs: All pertinent labs within the past 24 hours have been reviewed.    Significant Imaging: I have reviewed all pertinent imaging results/findings within the past 24 hours.

## 2023-02-08 NOTE — NURSING
Patient AAOX4, VSS, pain controlled with PCA pump and PRN oral pain medication, midline surgical dressing incision clean dry and intact without gas or stool at this time, voiding per bedside commode, tolerating diet, IV sites clean dry and intact, bed low and locked with alarm activated, side rails upx2, call light and personal belongings in reach.

## 2023-02-08 NOTE — ASSESSMENT & PLAN NOTE
Serum sodium continues to improve  Likely etiology low effective circulating volume due to EF of 10%, may have component of low solute  Serum osm 267 --> 276  Urine Osm 460 --> 239    Plan:  -treat heart failure (GDMT per cardiology)  -continue lasix 40 PO  -serum sodium at her normal and asymptomatic at this time  -recommend nutrition consult

## 2023-02-09 NOTE — RESPIRATORY THERAPY
RAPID RESPONSE RESPIRATORY THERAPY ETCO2 CHECK         Time of visit: 908     Code Status: Full Code   : 1955  Bed: 1060/1060 A:   MRN: [id number]  Time spent at the bedside: < 15 min    SITUATION    Evaluated patient for: ETCo2 compliance    BACKGROUND    Why is the patient in the hospital?: Colocutaneous fistula    Patient has a past medical history of Acute coronary syndrome, Acute exacerbation of chronic obstructive pulmonary disease (COPD), Allergy, Arthritis, Cardiomyopathy, CHF (congestive heart failure), Coronary artery disease, Coronary artery disease of native artery of native heart with stable angina pectoris, Diverticulitis, Diverticulosis, Familial hypercholesterolemia, Former smoker, Heart attack, Heart disease, History of myocardial infarction, Hyperlipidemia, Hypertension, Hypertension, ICD (implantable cardioverter-defibrillator) in place, and Non-ST elevation myocardial infarction (NSTEMI).    24 Hours Vitals Range:  Temp:  [96.2 °F (35.7 °C)-98.5 °F (36.9 °C)]   Pulse:  []   Resp:  [14-18]   BP: ()/(51-59)   SpO2:  [89 %-100 %]     Labs:    Recent Labs     23  0333 23  1434 23  0227 23  0220   * 132* 131* 131*   K 5.1 4.4 3.8 3.7   CL 92* 92* 90* 89*   CO2 30* 30* 28 28   CREATININE 0.7 0.7 0.6 0.7   * 121* 73 89   PHOS 4.6*  --  3.2 4.2   MG 1.9  --  1.5* 1.9        No results for input(s): PH, PCO2, PO2, HCO3, POCSATURATED, BE in the last 72 hours.    ASSESSMENT/INTERVENTIONS      Last VS   Temp: 97.6 °F (36.4 °C) (1000)  Pulse: 72 ( 1123)  Resp: 14 (1000)  BP: 92/53 (1000)  SpO2: 98 % (1000)    Level of Consciousness: Level of Consciousness (AVPU): alert  Respiratory Effort: Respiratory Effort: Normal, Unlabored Expansion/Accessory Muscle Usage: Expansion/Accessory Muscles/Retractions: no use of accessory muscles  All Lung Field Breath Sounds: All Lung Fields Breath Sounds: diminished  Is the ETCO2 monitor on?  Yes  Is the patient wearing a cannula? Yes  Are ETCO2 orders placed? Yes  Is the patient on a PCA pump? Yes  ETCO2 monitored: ETCO2 (mmHg): 44 mmHg  Ambu at bedside: Ambu bag with the patient?: Yes, Adult Ambu    Active Orders   Respiratory Care    END TIDAL CO2 MONITOR Q12H     Frequency: Q12H     Number of Occurrences: Until Specified    Incentive spirometry     Frequency: Q4H     Number of Occurrences: Until Specified     Order Comments: Q4 while awake until discharge.  Educate patient in use; Keep incentive spirometer within reach; and Document incentive spirometer volume every 4 hours while awake.    ((10 sets per hour (3-5 inspirations per set)) on original order)      Oxygen Continuous     Frequency: Continuous     Number of Occurrences: Until Specified     Order Comments: Discontinue when Sp02 is greater than or equal to 95% of equal to Preop Sp02       Order Questions:      Device type: Low flow      Device: Simple Face Mask      Titrate O2 per Oxygen Titration Protocol: Yes      Notify MD of: Inability to achieve desired SpO2    Oxygen Continuous     Frequency: Continuous     Number of Occurrences: Until Specified     Order Questions:      Device type: Low flow      Device: Nasal Cannula (1- 5 Liters)      LPM: 2      Titrate O2 per Oxygen Titration Protocol: Yes      To maintain SpO2 goal of: >= 90%      Notify MD of: Inability to achieve desired SpO2; Sudden change in patient status and requires 20% increase in FiO2; Patient requires >60% FiO2    Pulse Oximetry Q Shift     Frequency: Q Shift     Number of Occurrences: Until Specified       RECOMMENDATIONS    We recommend: RRT Recs: Continue POC per primary team.      FOLLOW-UP    Please call back the Rapid Response RT, Saray Sanchez RRT at x [phone] for any questions or concerns.

## 2023-02-09 NOTE — ASSESSMENT & PLAN NOTE
Cardiology consulted for management of ADHF, now signed off  Recent echo showed EF of 10%  Patient hypervolemic on presentation, appears more euvolemic today  Home GDMT: Toprol-XL 25, losartan 25 mg.  Initial CXR without new detrimental changes since prior, BNP 2600 on arrival    Good UOP on lasix 40 mg PO. Transitioned to IV due to new ileus per surgery.     Recommendations:   - diuresis per cardiology/nephrology  - recommend starting lasix 40mg daily PO  - replete Mag>2, K>4  - continue Toprol-XL 12.5 per cardiology recommendations  - losartan 25mg qd, decreased to 12.5 mg qd given hypotension  - continue losartan 12.5mg daily per cardiology req  - would avoid IV anti-hypertensives

## 2023-02-09 NOTE — PROGRESS NOTES
Ostomy consult received. Patient is known to [facility] service due to previous colostomy and fistula. She is laying in bed with pouch intact and no leakage noted. She reports passing gas but is concerned regarding constipation. She states MD is aware and has her on mirilax to prevent. She said today is better than yesterday but she does not feel well enough for a  Pouch change. Will follow up with patient tomorrow. Nursing to continue care .

## 2023-02-09 NOTE — PROGRESS NOTES
Patient Name: Odette Hart  Date: 2/8/2023  Service: Colon and Rectal Surgery    Subjective:  NAEO. Increased left subcostal pain this morning with cramping abdominal pain as well as abdominal bloating; small volume PO intake yesterday, denies nausea/vomiting or belching/hiccups/heartburn. Voiding freely with good UOP on Lasix BID, denies symptoms of retention. Denies fevers/chills/sweats, lightheadedness/dizziness, chest pain/shortness of breath, upper or lower extremity edema/pain.     Medications:    Current Facility-Administered Medications:     [COMPLETED] acetaminophen 1,000 mg/100 mL (10 mg/mL) injection 1,000 mg, 1,000 mg, Intravenous, Q8H, Stopped at 02/07/23 0527 **FOLLOWED BY** acetaminophen tablet 1,000 mg, 1,000 mg, Oral, Q8H, Pablo Ramirez MD, 1,000 mg at 02/08/23 1355    albuterol inhaler 2 puff, 2 puff, Inhalation, Q6H PRN, Pablo Ramirez MD    ALPRAZolam tablet 0.5 mg, 0.5 mg, Oral, Nightly PRN, Pablo Ramirez MD    aspirin EC tablet 81 mg, 81 mg, Oral, Daily, Pablo Ramirez MD, 81 mg at 02/08/23 0928    balsam peru-castor oiL Oint, , Topical (Top), BID, Pablo Ramirez MD, Given at 02/08/23 0932    dextrose 10% bolus 125 mL 125 mL, 12.5 g, Intravenous, PRN, Pablo Ramirez MD    dextrose 10% bolus 250 mL 250 mL, 25 g, Intravenous, PRN, Pablo Ramirez MD    enoxaparin injection 40 mg, 40 mg, Subcutaneous, Daily, Pablo Ramirez MD, 40 mg at 02/08/23 1629    ezetimibe tablet 10 mg, 10 mg, Oral, Daily, Pablo Ramirez MD, 10 mg at 02/08/23 0931    famotidine tablet 20 mg, 20 mg, Oral, Daily, Pablo Ramirez MD, 20 mg at 02/08/23 0928    fluticasone propionate 50 mcg/actuation nasal spray 50 mcg, 1 spray, Each Nostril, Daily PRN, Pablo Ramirez MD    furosemide injection 40 mg, 40 mg, Intravenous, Q12H, Pablo Ramirez MD, 40 mg at 02/08/23 0929    HYDROmorphone PCA syringe 6 mg/30 mL (0.2 mg/mL) NS, , Intravenous, Continuous, Pablo Ramirez MD, Rate Change at  02/08/23 0813    losartan split tablet 12.5 mg, 12.5 mg, Oral, Daily, Pablo Ramirez MD    metoprolol succinate (TOPROL-XL) 24 hr split tablet 12.5 mg, 12.5 mg, Oral, Daily, Pablo Ramirez MD, 12.5 mg at 02/08/23 0928    mupirocin 2 % ointment, , Nasal, BID, Pablo Ramirez MD, Given at 02/08/23 0932    naloxone 0.4 mg/mL injection 0.02 mg, 0.02 mg, Intravenous, PRN, Pablo Ramirez MD    nitroGLYCERIN SL tablet 0.4 mg, 0.4 mg, Sublingual, Q5 Min PRN, Pablo Ramirez MD    ondansetron injection 4 mg, 4 mg, Intravenous, Q6H PRN, Pablo Ramirez MD, 4 mg at 02/08/23 1034    prochlorperazine injection Soln 5 mg, 5 mg, Intravenous, Q6H PRN, Pablo Ramirez MD, 5 mg at 02/05/23 1444    sodium chloride 0.9% flush 10 mL, 10 mL, Intravenous, PRN, Pablo Ramirez MD    sodium chloride 0.9% flush 10 mL, 10 mL, Intra-Catheter, PRN, Pablo Ramirez MD    Vital Signs:  Vitals:    02/08/23 1803   BP: (!) 124/56   Pulse: 104   Resp:    Temp:        In/Out:  Intake/Output - Last 3 Shifts         02/06 0700 02/07 0659 02/07 0700 02/08 0659 02/08 0700 02/09 0659    I.V. (mL/kg)   49 (0.9)    IV Piggyback 1600      Total Intake(mL/kg) 1600 (28.5)  49 (0.9)    Urine (mL/kg/hr) 625 (0.5) 1400 (1) 400 (0.6)    Stool 0 0 0    Total Output 625 1400 400    Net +975 -1400 -351           Stool Occurrence  0 x             Physical Exam:  General: Alert, oriented, in no apparent distress  HEENT: Sclera anicteric, trachea midline  Lungs: Normal respiratory rate and effort on room air  Abdomen: Soft, appropriate renata-incisional TTP, moderately distended. Incision with sterile dressing from OR in place with minimal strikethrough. LUQ end colostomy pink and well perfused with small volume flatus but no stool or sweat in bag.  Extremities: Warm, well perfused, no edema, SCDs in place  Neuro: Grossly intact, moves all extremities  Psych: Appropriate affect    Laboratory Studies:  Complete Blood Count:  Lab Results   Component  Value Date/Time    WBC 2.33 (L) 02/07/2023 02:34 PM    HGB 10.5 (L) 02/07/2023 02:34 PM    HCT 35.1 (L) 02/07/2023 02:34 PM    HCT 46 05/06/2022 09:55 PM    RBC 4.46 02/07/2023 02:34 PM     02/07/2023 02:34 PM       Basic Chemistry Panel:  Lab Results   Component Value Date/Time     (L) 02/08/2023 02:27 AM    K 3.8 02/08/2023 02:27 AM    CL 90 (L) 02/08/2023 02:27 AM    CO2 28 02/08/2023 02:27 AM    BUN 18 02/08/2023 02:27 AM    CREATININE 0.6 02/08/2023 02:27 AM    CALCIUM 8.7 02/08/2023 02:27 AM       Hepatic Panel:  Lab Results   Component Value Date/Time    AST 36 02/06/2023 01:15 PM    ALT 23 02/06/2023 01:15 PM    ALKPHOS 116 02/06/2023 01:15 PM    BILITOT 1.4 (H) 02/06/2023 01:15 PM    ALBUMIN 1.7 (L) 02/06/2023 01:15 PM       Coagulation Panel:  Lab Results   Component Value Date/Time    INR 1.2 07/21/2022 04:28 AM       Imaging Studies:  XR Abd (2/8/23):  There is a right-sided ureteral stent identified.  No significant intra-abdominal calcifications noted.  No significant localized bowel dilatation.  Rounded soft tissue density in the left paraspinal area probably related to the previous surgery.    Assessment:  Odette Hart is a 67 y.o. year old female with history of HTN, CAD s/p PCI c/b HFrEF (EF 10%), MDD, debility, severe malnutrition, and open cholecystectomy and takedown of colovesical and colovaginal fistuli (two separate areas) with sigmoid colectomy, partial cecectomy and appendectomy, drainage of intra-abdominal abscess and end colostomy 5/9/2022 recently admitted 12/22/22 - 12/30/22 for new colocutaneous fistula admitted 1/32/23 with incarcerated colonic prolapse from her fistula s/p manual reduction now s/p open transverse/descending colectomy with takedown of colocutaneous fistula and end colostomy + parastomal hernia repair + lysis of adhesions >60 minutes (Juliane) + debridement of skin/subcutaneos fat/fascia/muscle (Daniel) 2/7/23, now with developing postoperative  ileus.    Plan:  - Developing postoperative ileus with abdominal distention, LUQ pain, enlarging gastric bubble on XR and minimal ostomy output: return to NPO, cont HLIV in setting of HFrEF  - Return dPCA to 0.2 demand and hold oxy 5/10mg prn as well as gabapentin in setting of ileus; cont Lasix 40 BID (will transition to IV given ileus) and losartan, cont Toprol XL and ASA81, likely resume Plavix after 48 hours postop  - LVX/SCD, ambulation/IS PPX  - PT/OT for postoperative mobilization/ambulation  - WOCN c/s for fresh end colostomy care and repeat teaching  - Dispo: ongoing, GISSU    Patient discussed with attending, Dr. Cardozo.    Pablo Ramirez MD  Colon and Rectal Surgery Fellow

## 2023-02-09 NOTE — SUBJECTIVE & OBJECTIVE
Interval History: Patient states yesterday afternoon she had chest pain that resolved on its own. EKG at time with no acute changes concerning for ACS. Na remains at baseline. Stool/gas present in colostomy bag. Reports distention/abd pain resolving. Complained pain meds are causing her to hallucinate, felt confused last night. Recommend starting on Oral lasix 40mg daily as deemed appropriate by surgery team in light of illeus. If CP recurs, please obtain troponin/ repeat EKG    Review of Systems   Constitutional:  Negative for chills and fever.   HENT:  Negative for congestion, rhinorrhea and sore throat.    Respiratory:  Negative for cough, shortness of breath and wheezing.    Cardiovascular:  Negative for chest pain and palpitations.   Gastrointestinal:  Positive for abdominal pain and nausea. Negative for vomiting.   Endocrine: Negative for polydipsia.   Genitourinary:  Negative for difficulty urinating, dysuria and hematuria.   Neurological:  Negative for dizziness, syncope, weakness, light-headedness and headaches.   Psychiatric/Behavioral:  Negative for agitation and confusion.    Objective:     Vital Signs (Most Recent):  Temp: 97.6 °F (36.4 °C) (02/09/23 1000)  Pulse: 80 (02/09/23 1000)  Resp: 14 (02/09/23 1000)  BP: (!) 92/53 (02/09/23 1000)  SpO2: 98 % (02/09/23 1000)   Vital Signs (24h Range):  Temp:  [96.2 °F (35.7 °C)-98.5 °F (36.9 °C)] 97.6 °F (36.4 °C)  Pulse:  [] 80  Resp:  [14-18] 14  SpO2:  [89 %-100 %] 98 %  BP: ()/(51-59) 92/53     Weight: 56.2 kg (124 lb)  Body mass index is 21.97 kg/m².    Intake/Output Summary (Last 24 hours) at 2/9/2023 1011  Last data filed at 2/8/2023 1444  Gross per 24 hour   Intake 49 ml   Output 400 ml   Net -351 ml      Physical Exam  Vitals and nursing note reviewed.   Constitutional:       General: She is not in acute distress.     Appearance: She is ill-appearing.   HENT:      Head: Normocephalic and atraumatic.      Right Ear: External ear normal.       Left Ear: External ear normal.      Nose: No congestion or rhinorrhea.   Eyes:      General:         Right eye: No discharge.         Left eye: No discharge.      Extraocular Movements: Extraocular movements intact.      Pupils: Pupils are equal, round, and reactive to light.   Cardiovascular:      Rate and Rhythm: Normal rate and regular rhythm.      Heart sounds: Normal heart sounds. No murmur heard.  Pulmonary:      Effort: No respiratory distress.      Breath sounds: Rales (bibasilar) present. No wheezing.   Abdominal:      General: There is no distension.      Comments: Colostomy, vertical/midline abdominal excision, well approximated CDI.   Musculoskeletal:         General: No tenderness.      Cervical back: Normal range of motion. No rigidity.      Right lower leg: No edema.      Left lower leg: No edema.   Skin:     Findings: Bruising present. No erythema.   Neurological:      General: No focal deficit present.      Mental Status: She is alert and oriented to person, place, and time.   Psychiatric:         Mood and Affect: Mood normal.         Behavior: Behavior normal.       Significant Labs: All pertinent labs within the past 24 hours have been reviewed.    Significant Imaging: I have reviewed all pertinent imaging results/findings within the past 24 hours.

## 2023-02-09 NOTE — PROGRESS NOTES
"[facility]  Adult Nutrition  Progress Note    SUMMARY       Recommendations    1) Continue clear liquid -fluid restriction per MD  2) Continue Boost Plus & Boost Breeze    3) RD following    Goals: meets % een/epn by next rd f/u  Nutrition Goal Status: new  Communication of RD Recs: other (comment) (POC)    Assessment and Plan    Nutrition Problem  Increased nutritional needs     Related to (etiology):   Condition associated with diagnoses     Signs and Symptoms (as evidenced by):   Wounds, CLD     Interventions   Collaboration with other providers  [facility]     Nutrition Diagnosis Status:   New    Reason for Assessment    Reason For Assessment: RD follow-up  Diagnosis: gastrointestinal disease (Colocutaneous fistula)  Relevant Medical History: HTN, CAD , HFrEH, MDD, severe malnutrition, cholecystectomy  Interdisciplinary Rounds: did not attend  General Information Comments: Pt assessed for f/u, NPO w/ TPN infusing. Pt has colostomy. Per RN note passing gas, pt concerned w/ constipation. On miralax. (LBM 2/5 via colostomy) BMI 21.97. Wts stable. Noted w/ surgical wound to abdomen. NFPE not warrented at this time. RD following.    Nutrition Risk Screen    Nutrition Risk Screen: no indicators present    Nutrition/Diet History    Spiritual, Cultural Beliefs, Congregational Practices, Values that Affect Care: no  Food Allergies: shellfish    Anthropometrics    Temp: 97.6 °F (36.4 °C)  Height Method: Stated  Height: 5' 3" (160 cm)  Height (inches): 63 in  Weight Method: Bed Scale  Weight: 56.2 kg (124 lb)  Weight (lb): 124 lb  Ideal Body Weight (IBW), Female: 115 lb  % Ideal Body Weight, Female (lb): 107.83 %  BMI (Calculated): 22       Lab/Procedures/Meds    Pertinent Labs Reviewed: reviewed  Pertinent Labs Comments: H/H: 9.9/31.6, MCH: 23.7, MCHC: 31.3, Na: 131, [place]: 8.5  Pertinent Medications Reviewed: reviewed  Pertinent Medications Comments: enoxaparin, famotidine      Estimated/Assessed " Needs    Weight Used For Calorie Calculations: 56.2 kg (124 lb) (Actual BW)  Energy Calorie Requirements (kcal): 3653-6068 (20-25 Kcal/kg (actual BW))  Energy Need Method: Kcal/kg  Protein Requirements: 73-84 (1.2-1.4 g/kg (actual BW))  Weight Used For Protein Calculations: 56.2 kg (124 lb) (acutal BW)        RDA Method (mL): 1124         Nutrition Prescription Ordered    Current Diet Order: clear liquid -1000 ml fluid  Oral Nutrition Supplement: boost breeze, boost plus    Evaluation of Received Nutrient/Fluid Intake    I/O: -8.7 L since admit  Energy Calories Required: not meeting needs  Protein Required: not meeting needs  Comments: LBM 2/5 colostomy  Tolerance: tolerating  % Intake of Estimated Energy Needs: 0 - 25 %  % Meal Intake: 0 - 25 %    Nutrition Risk    Level of Risk/Frequency of Follow-up: low (1x/week)     Monitor and Evaluation    Food and Nutrient Intake: energy intake, food and beverage intake  Food and Nutrient Adminstration: diet order  Knowledge/Beliefs/Attitudes: food and nutrition knowledge/skill, beliefs and attitudes  Physical Activity and Function: nutrition-related ADLs and IADLs  Anthropometric Measurements: height/length, weight, weight change, body mass index  Biochemical Data, Medical Tests and Procedures: electrolyte and renal panel, gastrointestinal profile, glucose/endocrine profile, inflammatory profile, lipid profile  Nutrition-Focused Physical Findings: overall appearance     Nutrition Follow-Up    RD Follow-up?: Yes    Justina Perez, Registration Eligible, Provisional LDN

## 2023-02-09 NOTE — PROGRESS NOTES
Patient Name: Odette Hart  Date: 2/9/2023  Service: Colon and Rectal Surgery    Subjective:  NAEO. LUQ abdominal pain resolved after made NPO; denies nausea/vomiting or belching/hiccups/heartburn. Passing small volume gas into bag, no stool/sweat. Had confusion overnight attributed to dPCA, oriented/appropriate this morning. Voiding freely with good UOP on Lasix BID, denies symptoms of retention. Denies fevers/chills/sweats, lightheadedness/dizziness, chest pain/shortness of breath, upper or lower extremity edema/pain.     Medications:    Current Facility-Administered Medications:     [COMPLETED] acetaminophen 1,000 mg/100 mL (10 mg/mL) injection 1,000 mg, 1,000 mg, Intravenous, Q8H, Stopped at 02/07/23 0527 **FOLLOWED BY** acetaminophen tablet 1,000 mg, 1,000 mg, Oral, Q8H, Pablo Ramirez MD, 1,000 mg at 02/08/23 1355    albuterol inhaler 2 puff, 2 puff, Inhalation, Q6H PRN, Pablo Ramirez MD    ALPRAZolam tablet 0.5 mg, 0.5 mg, Oral, Nightly PRN, Pablo Ramirez MD, 0.5 mg at 02/09/23 0000    aspirin EC tablet 81 mg, 81 mg, Oral, Daily, Pablo Ramirez MD, 81 mg at 02/09/23 1002    balsam peru-castor oiL Oint, , Topical (Top), BID, Pablo Ramirez MD, Given at 02/09/23 1004    clopidogreL tablet 75 mg, 75 mg, Oral, Daily, Pablo Ramirez MD, 75 mg at 02/09/23 1002    dextrose 10% bolus 125 mL 125 mL, 12.5 g, Intravenous, PRN, Pablo Ramirez MD    dextrose 10% bolus 250 mL 250 mL, 25 g, Intravenous, PRN, Pablo Ramirez MD    enoxaparin injection 40 mg, 40 mg, Subcutaneous, Daily, Pablo Ramirez MD, 40 mg at 02/08/23 1629    ezetimibe tablet 10 mg, 10 mg, Oral, Daily, Pablo Ramirez MD, 10 mg at 02/09/23 1003    famotidine tablet 20 mg, 20 mg, Oral, Daily, Pablo Ramirez MD, 20 mg at 02/09/23 1002    fluticasone propionate 50 mcg/actuation nasal spray 50 mcg, 1 spray, Each Nostril, Daily PRN, Pablo Ramirez MD    [START ON 2/10/2023] furosemide tablet 40 mg, 40 mg, Oral, Daily,  Fahad Smith MD    HYDROmorphone PCA syringe 6 mg/30 mL (0.2 mg/mL) NS, , Intravenous, Continuous, Rafa Cardozo MD, Rate Change at 02/09/23 1255    losartan split tablet 12.5 mg, 12.5 mg, Oral, Daily, Pablo Ramirez MD    metoprolol succinate (TOPROL-XL) 24 hr split tablet 12.5 mg, 12.5 mg, Oral, Daily, Pablo Ramirez MD, 12.5 mg at 02/08/23 0928    mupirocin 2 % ointment, , Nasal, BID, Pablo Ramirez MD, Given at 02/09/23 1004    naloxone 0.4 mg/mL injection 0.02 mg, 0.02 mg, Intravenous, PRN, Pablo Ramirez MD    nitroGLYCERIN SL tablet 0.4 mg, 0.4 mg, Sublingual, Q5 Min PRN, Pablo Ramirez MD    nystatin 100,000 unit/mL suspension 500,000 Units, 500,000 Units, Oral, QID, Fahad Smith MD    ondansetron injection 4 mg, 4 mg, Intravenous, Q6H PRN, Pablo Ramirez MD, 4 mg at 02/08/23 1034    prochlorperazine injection Soln 5 mg, 5 mg, Intravenous, Q6H PRN, aPblo Ramirez MD, 5 mg at 02/05/23 1444    sodium chloride 0.9% flush 10 mL, 10 mL, Intravenous, PRN, Pablo Ramirez MD    sodium chloride 0.9% flush 10 mL, 10 mL, Intra-Catheter, PRN, Pablo Ramirez MD    Vital Signs:  Vitals:    02/09/23 1255   BP:    Pulse:    Resp: 14   Temp:        In/Out:  Intake/Output - Last 3 Shifts         02/07 0700 02/08 0659 02/08 0700  02/09 0659 02/09 0700  02/10 0659    P.O.   0    I.V. (mL/kg)  49 (0.9)     IV Piggyback       Total Intake(mL/kg)  49 (0.9) 0 (0)    Urine (mL/kg/hr) 1400 (1) 400 (0.3)     Stool 0 0     Total Output 1400 400     Net -1400 -351 0           Stool Occurrence 0 x 0 x             Physical Exam:  General: Alert, oriented, in no apparent distress  HEENT: Sclera anicteric, trachea midline  Lungs: Normal respiratory rate and effort on room air  Abdomen: Soft, appropriate renata-incisional TTP, moderately distended. Incision clean/dry/intact with vertical mattress nylon sutures in place, no surrounding erythema or drainage. LUQ end colostomy pink and well perfused with small  volume flatus but no stool or sweat in bag.  Extremities: Warm, well perfused, no edema, SCDs in place  Neuro: Grossly intact, moves all extremities  Psych: Appropriate affect    Laboratory Studies:  Complete Blood Count:  Lab Results   Component Value Date/Time    WBC 2.95 (L) 02/09/2023 02:20 AM    HGB 9.9 (L) 02/09/2023 02:20 AM    HCT 31.6 (L) 02/09/2023 02:20 AM    HCT 46 05/06/2022 09:55 PM    RBC 4.17 02/09/2023 02:20 AM     02/09/2023 02:20 AM       Basic Chemistry Panel:  Lab Results   Component Value Date/Time     (L) 02/09/2023 02:20 AM    K 3.7 02/09/2023 02:20 AM    CL 89 (L) 02/09/2023 02:20 AM    CO2 28 02/09/2023 02:20 AM    BUN 17 02/09/2023 02:20 AM    CREATININE 0.7 02/09/2023 02:20 AM    CALCIUM 8.5 (L) 02/09/2023 02:20 AM       Hepatic Panel:  Lab Results   Component Value Date/Time    AST 36 02/06/2023 01:15 PM    ALT 23 02/06/2023 01:15 PM    ALKPHOS 116 02/06/2023 01:15 PM    BILITOT 1.4 (H) 02/06/2023 01:15 PM    ALBUMIN 1.7 (L) 02/06/2023 01:15 PM       Coagulation Panel:  Lab Results   Component Value Date/Time    INR 1.2 07/21/2022 04:28 AM       Imaging Studies:  XR Abd (2/8/23):  There is a right-sided ureteral stent identified.  No significant intra-abdominal calcifications noted.  No significant localized bowel dilatation.  Rounded soft tissue density in the left paraspinal area probably related to the previous surgery.    Assessment:  Odette Hart is a 67 y.o. year old female with history of HTN, CAD s/p PCI c/b HFrEF (EF 10%), MDD, debility, severe malnutrition, and open cholecystectomy and takedown of colovesical and colovaginal fistuli (two separate areas) with sigmoid colectomy, partial cecectomy and appendectomy, drainage of intra-abdominal abscess and end colostomy 5/9/2022 recently admitted 12/22/22 - 12/30/22 for new colocutaneous fistula admitted 1/32/23 with incarcerated colonic prolapse from her fistula s/p manual reduction now s/p open  transverse/descending colectomy with takedown of colocutaneous fistula and end colostomy + parastomal hernia repair + lysis of adhesions >60 minutes (Juliane) + debridement of skin/subcutaneos fat/fascia/muscle (Daniel) 2/7/23, now with developing postoperative ileus.    Plan:  - Persistent postoperative ileus, awaiting return of bowel function: continue NPO and HLIV in setting of HFrEF, follow labs for monitoring of fluid status in setting of ongoing diuresis  - Decr dPCA to 0.1 demand in setting of delirium/confusion, hold oxy 5/10mg prn as well as gabapentin in setting of ileus;   - h/o HFrEF: cont home meds with Lasix 40 BID (cont IV in setting of ileus to ensure complete bioavailability), ASA continued throughout perioperative period, Plavix restarted 2/9/23  - [facility] medicine recs for management of multiple comorbidities  - LVX/SCD, ambulation/IS PPX  - PT/OT for postoperative mobilization/ambulation  - WOCN c/s for fresh end colostomy care and repeat teaching  - Dispo: ongoing, GISSU    Patient discussed with attending, Dr. Cardozo.    Pablo Ramirez MD  Colon and Rectal Surgery Fellow

## 2023-02-09 NOTE — PLAN OF CARE
Pt was very agitated and anxious at start of shift. Yelling and cursing at staff and refusing care. Pt eventually calmed down after admin of Xanax. Pt rested well after apologized to staff for behavior however continued to refuse ordered meds.LLQ colostomy intact with small amount of flatus noted to bag overnight. Dilaudid PCA in use manages pain effectively. Safety and comfort maintained overnight.

## 2023-02-09 NOTE — PLAN OF CARE
02/09/23 1533   Discharge Reassessment   Assessment Type Discharge Planning Reassessment   Discharge Plan discussed with: Patient   Discharge Plan A Home Health   Discharge Plan B Rehab   DME Needed Upon Discharge  other (see comments)  (TBD)   Discharge Barriers Identified None     Patient with persistent post op ileus.  Patient is on a CLD, monitoring I/O. WOCN for fresh end colostomy.  Plavix started 2/9/23.  PT recommends HHPT.    Medically stable for discharge: ba Hassan RN  Case Management  [facility]  [phone]

## 2023-02-09 NOTE — ASSESSMENT & PLAN NOTE
History of Cad with PCIx2 LAD in 2012 and LCX 1/2022  Home regimen: DAPT    No further surgical intervention planned inpatient by CRS or urology.     - continue aspirin  - continue clopidogrel

## 2023-02-09 NOTE — NURSING
Surgery on-call provider notified that pt is refusing all HS medication & care. Several attempts made and pt repeatedly refuses. Will re-attempt.

## 2023-02-09 NOTE — PLAN OF CARE
Recommendations    1) Continue clear liquid -fluid restriction per MD  2) Continue Boost Plus & Boost Breeze    3) RD following    Goals: meets % een/epn by next rd f/u  Nutrition Goal Status: new  Communication of RD Recs: other (comment) (POC)

## 2023-02-09 NOTE — PROGRESS NOTES
[facility] Medicine  Progress Note    Patient Name: Odette Hart  MRN: [id number]  Patient Class: IP- Inpatient   Admission Date: 1/31/2023  Length of Stay: 9 days  Attending Physician: Rafa Cardozo MD  Primary Care Provider: Braxton Barragan MD        Subjective:     Principal Problem:Colocutaneous fistula        HPI:  66 yo F with HTN, HLD (statin intolerance), DM, CAD s/p PCI to LAD (2012)/LCX (1/2022), HFrEF (EF10%), debility, severe malnutrition, and the following procedures performed 5/2022: cholecystectomy, takedown of colovesical and colovaginal fistula, sigmoid colectomy, partial cecectomy, end colostomy who presents for colocutaneous fistula repair with abdominal wall reconstruction. On admission she was found to be hyponatremic and in acute on chronic decompensated heart failure. Cardiology was consulted to manage her HF and have since signed off as her volume status has become more euvolemic following diuresis. Medicine was consulted for pr-eop evaluation and continued management of her hyponatremia. Per chart review patient is chronically, mildly hyponatremic at baseline. She takes Lasix, Toprol, spironolactone, and losartan for her HFrEF. She reports correct use of her home medications. She states her diet consists of peanut butter and crackers. Denies CP/SOB/nausea vomiting, cough fever chills, polydipsia. Her Na has been improving with diuresis. Patient has multiple risk factors that complicate surgery: acute on chronic decompensated HF, CAD with PCIx2, hypertension, hyperlipidemia, malnutrition. This being said she does not have reported/documented/observed dysfunction of liver, kidney, lung, clotting.    On examination, she is chronically ill appearing/in no acute distress/at mental baseline. AF, /58 (78), sat 98% on RA, no leukocytosis, hgb 11.8, Hct 38.5, Plts 237, moderate Hyponatremia Na 124 (initially 122 on admission) with labile Baseline, K 3.1, Cl 85, Bun 13,  Cr 00.8, Ca 7.9, albumin 2-3. BNP 2600, UOP 2300 on lasix 80mg    CXR with mild pulmonary congestion      Overview/Hospital Course:  Admitted for planned colocutaneous fistula repair, found to be hyponatremic and in ADHF. Started on IV diuresis, >6 L UOP, returned to euvolemia. Urine/serum studies consistent with hypervolemic hyponatremia most likely in 2/2 to acute on chronic HF. She does have multiple factors that maker her high risk for surgery, primarily her severrely reduced LVEF 10%. Cardiology and medicine consulted for pre-operative evaluation. Agree she is high risk, but need of her procedure outweigh the risks. 2/6 Patient tolerated colocutaneous takedown/reconstruction well. Her Hyponatremia has resolved with Lasix 80mg BID. Patient expressed apprehension with continuing this diuretic dosage. Concern for development of post op ileus, KUB with evolving gastric bubble, patient with ostomy output the following day.       Interval History: Patient states yesterday afternoon she had chest pain that resolved on its own. EKG at time with no acute changes concerning for ACS. Na remains at baseline. Stool/gas present in colostomy bag. Reports distention/abd pain resolving. Complained pain meds are causing her to hallucinate, felt confused last night. Recommend starting on Oral lasix 40mg daily as deemed appropriate by surgery team in light of illeus. If CP recurs, please obtain troponin/ repeat EKG    Review of Systems   Constitutional:  Negative for chills and fever.   HENT:  Negative for congestion, rhinorrhea and sore throat.    Respiratory:  Negative for cough, shortness of breath and wheezing.    Cardiovascular:  Negative for chest pain and palpitations.   Gastrointestinal:  Positive for abdominal pain and nausea. Negative for vomiting.   Endocrine: Negative for polydipsia.   Genitourinary:  Negative for difficulty urinating, dysuria and hematuria.   Neurological:  Negative for dizziness, syncope, weakness,  light-headedness and headaches.   Psychiatric/Behavioral:  Negative for agitation and confusion.    Objective:     Vital Signs (Most Recent):  Temp: 97.6 °F (36.4 °C) (02/09/23 1000)  Pulse: 80 (02/09/23 1000)  Resp: 14 (02/09/23 1000)  BP: (!) 92/53 (02/09/23 1000)  SpO2: 98 % (02/09/23 1000)   Vital Signs (24h Range):  Temp:  [96.2 °F (35.7 °C)-98.5 °F (36.9 °C)] 97.6 °F (36.4 °C)  Pulse:  [] 80  Resp:  [14-18] 14  SpO2:  [89 %-100 %] 98 %  BP: ()/(51-59) 92/53     Weight: 56.2 kg (124 lb)  Body mass index is 21.97 kg/m².    Intake/Output Summary (Last 24 hours) at 2/9/2023 1011  Last data filed at 2/8/2023 1444  Gross per 24 hour   Intake 49 ml   Output 400 ml   Net -351 ml      Physical Exam  Vitals and nursing note reviewed.   Constitutional:       General: She is not in acute distress.     Appearance: She is ill-appearing.   HENT:      Head: Normocephalic and atraumatic.      Right Ear: External ear normal.      Left Ear: External ear normal.      Nose: No congestion or rhinorrhea.   Eyes:      General:         Right eye: No discharge.         Left eye: No discharge.      Extraocular Movements: Extraocular movements intact.      Pupils: Pupils are equal, round, and reactive to light.   Cardiovascular:      Rate and Rhythm: Normal rate and regular rhythm.      Heart sounds: Normal heart sounds. No murmur heard.  Pulmonary:      Effort: No respiratory distress.      Breath sounds: Rales (bibasilar) present. No wheezing.   Abdominal:      General: There is no distension.      Comments: Colostomy, vertical/midline abdominal excision, well approximated CDI.   Musculoskeletal:         General: No tenderness.      Cervical back: Normal range of motion. No rigidity.      Right lower leg: No edema.      Left lower leg: No edema.   Skin:     Findings: Bruising present. No erythema.   Neurological:      General: No focal deficit present.      Mental Status: She is alert and oriented to person, place, and  time.   Psychiatric:         Mood and Affect: Mood normal.         Behavior: Behavior normal.       Significant Labs: All pertinent labs within the past 24 hours have been reviewed.    Significant Imaging: I have reviewed all pertinent imaging results/findings within the past 24 hours.      Assessment/Plan:      * Colocutaneous fistula  S/p revision, tolerated well  - managed by CRS    Ischemic cardiomyopathy  Cardiology consulted for management of ADHF, now signed off  Recent echo showed EF of 10%  Patient hypervolemic on presentation, appears more euvolemic today  Home GDMT: Toprol-XL 25, losartan 25 mg.  Initial CXR without new detrimental changes since prior, BNP 2600 on arrival    Good UOP on lasix 40 mg PO. Transitioned to IV due to new ileus per surgery.     Recommendations:   - diuresis per cardiology/nephrology  - recommend starting lasix 40mg daily PO  - replete Mag>2, K>4  - continue Toprol-XL 12.5 per cardiology recommendations  - losartan 25mg qd, decreased to 12.5 mg qd given hypotension  - continue losartan 12.5mg daily per cardiology req  - would avoid IV anti-hypertensives    Coronary artery disease  History of Cad with PCIx2 LAD in 2012 and LCX 1/2022  Home regimen: DAPT    No further surgical intervention planned inpatient by CRS or urology.     - continue aspirin  - continue clopidogrel    Hypertension  Pressures are well maintained    - continue Losartan 12.5 mg daily and Metoprolol 12.5 mg daily    Hypercholesterolemia  Lipids well controlled  Has documented statin intolerance    - continue ezetimibe    Hyponatremia  Chronic mild hyponatremia on chart review, now moderate on presentation  Found to be in ADHF on arrival  BNP 2600, volume overloaded, JVD present, bilateral LE edema  CXR with mild pulmonary congestion  Reports poor diet at home (crackers/peanutbutter)  Reduced albumin likely 2/2 to malnutrition    Urine/serum studies consistent with hypervolemic hyponatremia  Multifactorial,  hypervolemic + low solute intake    Na 131, stable    - strict I/Os  - 1000cc fluid restriction  - Na back at patient baseline      VTE Risk Mitigation (From admission, onward)         Ordered     enoxaparin injection 40 mg  Daily         02/07/23 1659     Place sequential compression device  Until discontinued         02/06/23 1447     IP VTE HIGH RISK PATIENT  Once         02/06/23 1447                Discharge Planning   SIVAN: 2/12/2023     Code Status: Full Code   Is the patient medically ready for discharge?:     Reason for patient still in hospital (select all that apply): Treatment and Pending disposition  Discharge Plan A: Home Health            Fahad Smith MD  Department of Hospital Medicine   [facility]

## 2023-02-09 NOTE — PT/OT/SLP PROGRESS
"Physical Therapy Treatment    Patient Name:  Odette Hart   MRN:  [id number]    Recommendations:     Discharge Recommendations: home health PT  Discharge Equipment Recommendations: none  Barriers to discharge: None    Assessment:     Odette Hart is a 67 y.o. female admitted with a medical diagnosis of Colocutaneous fistula.  She presents with the following impairments/functional limitations: weakness, impaired endurance, impaired functional mobility, gait instability, impaired balance. Pt limited with activity on this date 2/2 pain and fatigue. Pt required increased time with bed mobility, and tolerated sitting EOB ~15 minutes with SBA. Pt will continue to benefit from skilled PT services to improve all deficits noted above. Resume PT POC as indicated.     Rehab Prognosis: Good; patient would benefit from acute skilled PT services to address these deficits and reach maximum level of function.    Recent Surgery: Procedure(s) (LRB):  REVISION, COLOSTOMY (N/A)  INSERTION, CATHETER, URETER, BILATERAL (Left)  CYSTOSCOPY, WITH URETERAL STENT INSERTION (Right)  DEBRIDEMENT, ABDOMEN (N/A)  COLECTOMY, PARTIAL  LYSIS, ADHESIONS (N/A) 3 Days Post-Op    Plan:     During this hospitalization, patient to be seen 3 x/week to address the identified rehab impairments via gait training, therapeutic activities, therapeutic exercises and progress toward the following goals:    Plan of Care Expires:  03/09/23    Subjective     "The pain med has me feeling really sleepy."     Pain/Comfort:  Pain Rating 1:  (not rated)  Location 1: abdomen  Pain Addressed 1: Cessation of Activity, Pre-medicate for activity  Pain Rating Post-Intervention 1:  (not rated)      Objective:     Communicated with nursing prior to session.  Patient found HOB elevated with  (all lines intact) upon PT entry to room.     General Precautions: Standard, fall  Orthopedic Precautions: N/A  Braces: N/A  Respiratory Status: Nasal cannula, flow 2 L/min   "   Functional Mobility:  Bed Mobility:  Scooting: stand by assistance  Bridging: stand by assistance  Supine to Sit: stand by assistance  Sit to Supine: stand by assistance    Transfers:  Sit to Stand:  contact guard assistance with no AD  Gait: Pt took ~4 side steps to HOB CGA no AD. Distance limited 2/2 pt fatigue.   Balance: Pt sat EOB ~15 minutes with SBA      AM-PAC 6 CLICK MOBILITY          Treatment & Education:  -BLE therex x10 reps: AP,LAQ,GS  -All questions/concerns answered within PTA scope of practice.     Patient left HOB elevated with all lines intact, call button in reach, and nursing notified..    GOALS:   Multidisciplinary Problems       Physical Therapy Goals          Problem: Physical Therapy    Goal Priority Disciplines Outcome Goal Variances Interventions   Physical Therapy Goal     PT, PT/OT Ongoing, Progressing     Description: Goals to be met by: 2/15/2023     Patient will increase functional independence with mobility by performin. Gait  x 250 feet with Supervision using LRAD.   2. Ascend/descend 4 stair with right Handrails Supervision using no Assistive device.   3. Sit to Stand with Supervision using LRAD                       Time Tracking:     PT Received On: 23  PT Start Time: 1006     PT Stop Time: 1029  PT Total Time (min): 23 min     Billable Minutes: Therapeutic Activity 23    Treatment Type: Treatment  PT/PTA: PTA     PTA Visit Number: 1     2023

## 2023-02-10 PROBLEM — K56.7 ILEUS: Status: ACTIVE | Noted: 2023-01-01

## 2023-02-10 NOTE — RESPIRATORY THERAPY
RAPID RESPONSE RESPIRATORY THERAPY ETCO2 CHECK         Time of visit: 1352     Code Status: Full Code   : 1955  Bed: 1060/1060 A:   MRN: [id number]  Time spent at the bedside: < 15 min    SITUATION    Evaluated patient for: ETCo2 compliance    BACKGROUND    Why is the patient in the hospital?: Colocutaneous fistula    Patient has a past medical history of Acute coronary syndrome, Acute exacerbation of chronic obstructive pulmonary disease (COPD), Allergy, Arthritis, Cardiomyopathy, CHF (congestive heart failure), Coronary artery disease, Coronary artery disease of native artery of native heart with stable angina pectoris, Diverticulitis, Diverticulosis, Familial hypercholesterolemia, Former smoker, Heart attack, Heart disease, History of myocardial infarction, Hyperlipidemia, Hypertension, Hypertension, ICD (implantable cardioverter-defibrillator) in place, and Non-ST elevation myocardial infarction (NSTEMI).    24 Hours Vitals Range:  Temp:  [97.6 °F (36.4 °C)-98.4 °F (36.9 °C)]   Pulse:  [71-93]   Resp:  [16-20]   BP: ()/(52-58)   SpO2:  [96 %-100 %]     Labs:    Recent Labs     23  02223  0220 02/10/23  0332   * 131* 132*   K 3.8 3.7 3.7   CL 90* 89* 90*   CO2 28 28 31*   CREATININE 0.6 0.7 0.6   GLU 73 89 91   PHOS 3.2 4.2 2.6*   MG 1.5* 1.9 1.8        No results for input(s): PH, PCO2, PO2, HCO3, POCSATURATED, BE in the last 72 hours.    ASSESSMENT/INTERVENTIONS      Last VS   Temp: 97.9 °F (36.6 °C) (02/10 1143)  Pulse: 71 (02/10 1143)  Resp: 18 (02/10 1143)  BP: 89/54 (02/10 1143)  SpO2: 98 % (02/10 1143)    Level of Consciousness: Level of Consciousness (AVPU): alert  Respiratory Effort: Respiratory Effort: Unlabored, Normal Expansion/Accessory Muscle Usage: Expansion/Accessory Muscles/Retractions: expansion symmetric  All Lung Field Breath Sounds: All Lung Fields Breath Sounds: diminished  Is the ETCO2 monitor on? Yes  Is the patient wearing a cannula? Yes  Are ETCO2 orders  placed? Yes  Is the patient on a PCA pump? Yes  ETCO2 monitored: ETCO2 (mmHg): 43 mmHg  Ambu at bedside: Ambu bag with the patient?: Yes, Adult Ambu    Active Orders   Respiratory Care    END TIDAL CO2 MONITOR Q12H     Frequency: Q12H     Number of Occurrences: Until Specified    Incentive spirometry     Frequency: Q4H     Number of Occurrences: Until Specified     Order Comments: Q4 while awake until discharge.  Educate patient in use; Keep incentive spirometer within reach; and Document incentive spirometer volume every 4 hours while awake.    ((10 sets per hour (3-5 inspirations per set)) on original order)      Oxygen Continuous     Frequency: Continuous     Number of Occurrences: Until Specified     Order Questions:      Device type: Low flow      Device: Nasal Cannula (1- 5 Liters)      LPM: 2      Titrate O2 per Oxygen Titration Protocol: Yes      To maintain SpO2 goal of: >= 90%      Notify MD of: Inability to achieve desired SpO2; Sudden change in patient status and requires 20% increase in FiO2; Patient requires >60% FiO2    Pulse Oximetry Q Shift     Frequency: Q Shift     Number of Occurrences: Until Specified       RECOMMENDATIONS    We recommend: RRT Recs: Continue POC per primary team.      FOLLOW-UP    Please call back the Rapid Response [facility] RRT at x [phone] for any questions or concerns.

## 2023-02-10 NOTE — ASSESSMENT & PLAN NOTE
Chronic mild hyponatremia on chart review, now moderate on presentation  Found to be in ADHF on arrival  BNP 2600, volume overloaded, JVD present, bilateral LE edema  CXR with mild pulmonary congestion  Reports poor diet at home (crackers/peanutbutter)  Reduced albumin likely 2/2 to malnutrition    Urine/serum studies consistent with hypervolemic hyponatremia  Multifactorial, hypervolemic + low solute intake    Na stable    - strict I/Os  - 1000cc fluid restriction  - Na back at patient baseline

## 2023-02-10 NOTE — SUBJECTIVE & OBJECTIVE
Interval History: No further episodes of CP. NAEO. Urine output reduced today and darker appearing, labs consistent with volume contraction. Ok with lasix vacation if patient refuses. Defer to surgery for ileus management. Tolerated CLD this morning.    Review of Systems   Constitutional:  Negative for chills and fever.   HENT:  Negative for congestion, rhinorrhea and sore throat.    Respiratory:  Negative for cough, shortness of breath and wheezing.    Cardiovascular:  Negative for chest pain and palpitations.   Gastrointestinal:  Positive for abdominal pain (improving). Negative for nausea and vomiting.   Endocrine: Negative for polydipsia.   Genitourinary:  Negative for difficulty urinating, dysuria and hematuria.   Neurological:  Negative for dizziness, syncope, weakness, light-headedness and headaches.   Psychiatric/Behavioral:  Negative for agitation and confusion.    Objective:     Vital Signs (Most Recent):  Temp: 98 °F (36.7 °C) (02/10/23 0729)  Pulse:  (pt requesterd) (02/10/23 0854)  Resp: 20 (02/10/23 0729)  BP: (!) 105/55 (02/10/23 0729)  SpO2: 97 % (02/10/23 0729)   Vital Signs (24h Range):  Temp:  [97.6 °F (36.4 °C)-98.4 °F (36.9 °C)] 98 °F (36.7 °C)  Pulse:  [72-93] 85  Resp:  [14-20] 20  SpO2:  [96 %-100 %] 97 %  BP: ()/(52-58) 105/55     Weight: 56.2 kg (124 lb)  Body mass index is 21.97 kg/m².    Intake/Output Summary (Last 24 hours) at 2/10/2023 0943  Last data filed at 2/9/2023 1800  Gross per 24 hour   Intake 130 ml   Output 350 ml   Net -220 ml      Physical Exam  Vitals and nursing note reviewed.   Constitutional:       General: She is not in acute distress.     Appearance: She is ill-appearing.   HENT:      Head: Normocephalic and atraumatic.      Right Ear: External ear normal.      Left Ear: External ear normal.      Nose: No congestion or rhinorrhea.   Eyes:      General:         Right eye: No discharge.         Left eye: No discharge.      Extraocular Movements: Extraocular  movements intact.      Pupils: Pupils are equal, round, and reactive to light.   Cardiovascular:      Rate and Rhythm: Normal rate and regular rhythm.      Heart sounds: Normal heart sounds. No murmur heard.  Pulmonary:      Effort: No respiratory distress.      Breath sounds: Rales (bibasilar) present. No wheezing.   Abdominal:      General: There is no distension.      Comments: Colostomy, vertical/midline abdominal excision, bandage clean/dry   Musculoskeletal:         General: No tenderness.      Cervical back: Normal range of motion. No rigidity.      Right lower leg: No edema.      Left lower leg: No edema.   Skin:     Findings: Bruising present. No erythema.   Neurological:      General: No focal deficit present.      Mental Status: She is alert and oriented to person, place, and time.   Psychiatric:         Mood and Affect: Mood normal.         Behavior: Behavior normal.       Significant Labs: All pertinent labs within the past 24 hours have been reviewed.    Significant Imaging: I have reviewed all pertinent imaging results/findings within the past 24 hours.

## 2023-02-10 NOTE — PROGRESS NOTES
[facility]  Progress Note    Patient Name: Odette Hart  MRN: [id number]  Patient Class: IP- Inpatient   Admission Date: 1/31/2023  Length of Stay: 10 days  Attending Physician: Rafa Cardozo MD  Primary Care Provider: Braxton Barragan MD        Subjective:     Principal Problem:Colocutaneous fistula        HPI:  68 yo F with HTN, HLD (statin intolerance), DM, CAD s/p PCI to LAD (2012)/LCX (1/2022), HFrEF (EF10%), debility, severe malnutrition, and the following procedures performed 5/2022: cholecystectomy, takedown of colovesical and colovaginal fistula, sigmoid colectomy, partial cecectomy, end colostomy who presents for colocutaneous fistula repair with abdominal wall reconstruction. On admission she was found to be hyponatremic and in acute on chronic decompensated heart failure. Cardiology was consulted to manage her HF and have since signed off as her volume status has become more euvolemic following diuresis. Medicine was consulted for pr-eop evaluation and continued management of her hyponatremia. Per chart review patient is chronically, mildly hyponatremic at baseline. She takes Lasix, Toprol, spironolactone, and losartan for her HFrEF. She reports correct use of her home medications. She states her diet consists of peanut butter and crackers. Denies CP/SOB/nausea vomiting, cough fever chills, polydipsia. Her Na has been improving with diuresis. Patient has multiple risk factors that complicate surgery: acute on chronic decompensated HF, CAD with PCIx2, hypertension, hyperlipidemia, malnutrition. This being said she does not have reported/documented/observed dysfunction of liver, kidney, lung, clotting.    On examination, she is chronically ill appearing/in no acute distress/at mental baseline. AF, /58 (78), sat 98% on RA, no leukocytosis, hgb 11.8, Hct 38.5, Plts 237, moderate Hyponatremia Na 124 (initially 122 on admission) with labile Baseline, K 3.1, Cl 85, Bun 13,  Cr 00.8, Ca 7.9, albumin 2-3. BNP 2600, UOP 2300 on lasix 80mg    CXR with mild pulmonary congestion      Overview/Hospital Course:  Admitted for planned colocutaneous fistula repair, found to be hyponatremic and in ADHF. Started on IV diuresis, >6 L UOP, returned to euvolemia. Urine/serum studies consistent with hypervolemic hyponatremia most likely in 2/2 to acute on chronic HF. She does have multiple factors that maker her high risk for surgery, primarily her severrely reduced LVEF 10%. Cardiology and medicine consulted for pre-operative evaluation. Agree she is high risk, but need of her procedure outweigh the risks. 2/6 Patient tolerated colocutaneous takedown/reconstruction well. Her Hyponatremia has resolved with Lasix 80mg BID. Patient expressed apprehension with continuing this diuretic dosage.      Interval History: No further episodes of CP. NAEO. Urine output reduced today and darker appearing, labs consistent with volume contraction. Ok with lasix vacation if patient refuses. Defer to surgery for ileus management. Tolerated CLD this morning.    Review of Systems   Constitutional:  Negative for chills and fever.   HENT:  Negative for congestion, rhinorrhea and sore throat.    Respiratory:  Negative for cough, shortness of breath and wheezing.    Cardiovascular:  Negative for chest pain and palpitations.   Gastrointestinal:  Positive for abdominal pain (improving). Negative for nausea and vomiting.   Endocrine: Negative for polydipsia.   Genitourinary:  Negative for difficulty urinating, dysuria and hematuria.   Neurological:  Negative for dizziness, syncope, weakness, light-headedness and headaches.   Psychiatric/Behavioral:  Negative for agitation and confusion.    Objective:     Vital Signs (Most Recent):  Temp: 98 °F (36.7 °C) (02/10/23 0729)  Pulse:  (pt requesterd) (02/10/23 0854)  Resp: 20 (02/10/23 0729)  BP: (!) 105/55 (02/10/23 0729)  SpO2: 97 % (02/10/23 0729)   Vital Signs (24h Range):  Temp:   [97.6 °F (36.4 °C)-98.4 °F (36.9 °C)] 98 °F (36.7 °C)  Pulse:  [72-93] 85  Resp:  [14-20] 20  SpO2:  [96 %-100 %] 97 %  BP: ()/(52-58) 105/55     Weight: 56.2 kg (124 lb)  Body mass index is 21.97 kg/m².    Intake/Output Summary (Last 24 hours) at 2/10/2023 0943  Last data filed at 2/9/2023 1800  Gross per 24 hour   Intake 130 ml   Output 350 ml   Net -220 ml      Physical Exam  Vitals and nursing note reviewed.   Constitutional:       General: She is not in acute distress.     Appearance: She is ill-appearing.   HENT:      Head: Normocephalic and atraumatic.      Right Ear: External ear normal.      Left Ear: External ear normal.      Nose: No congestion or rhinorrhea.   Eyes:      General:         Right eye: No discharge.         Left eye: No discharge.      Extraocular Movements: Extraocular movements intact.      Pupils: Pupils are equal, round, and reactive to light.   Cardiovascular:      Rate and Rhythm: Normal rate and regular rhythm.      Heart sounds: Normal heart sounds. No murmur heard.  Pulmonary:      Effort: No respiratory distress.      Breath sounds: Rales (bibasilar) present. No wheezing.   Abdominal:      General: There is no distension.      Comments: Colostomy, vertical/midline abdominal excision, bandage clean/dry   Musculoskeletal:         General: No tenderness.      Cervical back: Normal range of motion. No rigidity.      Right lower leg: No edema.      Left lower leg: No edema.   Skin:     Findings: Bruising present. No erythema.   Neurological:      General: No focal deficit present.      Mental Status: She is alert and oriented to person, place, and time.   Psychiatric:         Mood and Affect: Mood normal.         Behavior: Behavior normal.       Significant Labs: All pertinent labs within the past 24 hours have been reviewed.    Significant Imaging: I have reviewed all pertinent imaging results/findings within the past 24 hours.      Assessment/Plan:      * Colocutaneous  fistula  S/p revision, tolerated well  - managed by CRS    Ischemic cardiomyopathy  Cardiology consulted for management of ADHF, now signed off  Recent echo showed EF of 10%  Patient hypervolemic on presentation, appears more euvolemic today  Home GDMT: Toprol-XL 25, losartan 25 mg.  Initial CXR without new detrimental changes since prior, BNP 2600 on arrival    Urine output less today, urine darker. Ok with lasix holding lasix today.    Recommendations:   - continue lasix 40mg daily PO, Ok if patient refuses.  - replete Mag>2, K>4  - continue Toprol-XL 12.5 per cardiology recommendations  - losartan 25mg qd, decreased to 12.5 mg qd given hypotension  - continue losartan 12.5mg daily per cardiology req  - would avoid IV anti-hypertensives    Coronary artery disease  History of Cad with PCIx2 LAD in 2012 and LCX 1/2022  Home regimen: DAPT    No further surgical intervention planned inpatient by CRS or urology.     - continue aspirin  - continue clopidogrel    Hypertension  Pressures are well maintained    - continue Losartan 12.5 mg daily and Metoprolol 12.5 mg daily    Hypercholesterolemia  Lipids well controlled  Has documented statin intolerance    - continue ezetimibe    Ileus  Patient has Post-operative ileus which is adynamic in etiology which is improving. This is inherent to her procedure/medications. Will treat conservatively with bowel rest, IV fluids, serial abdominal exams and avoidance of GI paralytics such as narcotics or anti-spasmodics. Monitor patient closely.     -continue management per primary      Hyponatremia  Chronic mild hyponatremia on chart review, now moderate on presentation  Found to be in ADHF on arrival  BNP 2600, volume overloaded, JVD present, bilateral LE edema  CXR with mild pulmonary congestion  Reports poor diet at home (crackers/peanutbutter)  Reduced albumin likely 2/2 to malnutrition    Urine/serum studies consistent with hypervolemic hyponatremia  Multifactorial, hypervolemic  + low solute intake    Na stable    - strict I/Os  - 1000cc fluid restriction  - Na back at patient baseline      VTE Risk Mitigation (From admission, onward)         Ordered     enoxaparin injection 40 mg  Daily         02/07/23 1659     Place sequential compression device  Until discontinued         02/06/23 1447     IP VTE HIGH RISK PATIENT  Once         02/06/23 1447                Discharge Planning   SIVAN: 2/12/2023     Code Status: Full Code   Is the patient medically ready for discharge?:     Reason for patient still in hospital (select all that apply): Treatment  Discharge Plan A: Home Health          Fahad Smith MD  Department of Hospital Medicine   [facility]

## 2023-02-10 NOTE — ASSESSMENT & PLAN NOTE
Cardiology consulted for management of ADHF, now signed off  Recent echo showed EF of 10%  Patient hypervolemic on presentation, appears more euvolemic today  Home GDMT: Toprol-XL 25, losartan 25 mg.  Initial CXR without new detrimental changes since prior, BNP 2600 on arrival    Urine output less today, urine darker. Ok with lasix holding lasix today.    Recommendations:   - continue lasix 40mg daily PO, Ok if patient refuses.  - replete Mag>2, K>4  - continue Toprol-XL 12.5 per cardiology recommendations  - losartan 25mg qd, decreased to 12.5 mg qd given hypotension  - continue losartan 12.5mg daily per cardiology req  - would avoid IV anti-hypertensives

## 2023-02-10 NOTE — PROGRESS NOTES
Patient Name: Odette Hart  Date: 2/10/2023  Service: Colon and Rectal Surgery    Subjective:  Pt remains NPO. Reports ambulating today which triggered a BM per rectum as well as small volume stool in ostomy bag. Brown liquid. Having mild abd discomfort. denies nausea/vomiting or belching/hiccups/heartburn. Oriented this am. Voiding freely with good UOP on Lasix BID, denies symptoms of retention. Denies fevers/chills/sweats, lightheadedness/dizziness, chest pain/shortness of breath, upper or lower extremity edema/pain.     Medications:    Current Facility-Administered Medications:     [COMPLETED] acetaminophen 1,000 mg/100 mL (10 mg/mL) injection 1,000 mg, 1,000 mg, Intravenous, Q8H, Stopped at 02/07/23 0527 **FOLLOWED BY** acetaminophen tablet 1,000 mg, 1,000 mg, Oral, Q8H, Pablo Ramirez MD, 1,000 mg at 02/09/23 2207    albuterol inhaler 2 puff, 2 puff, Inhalation, Q6H PRN, Pablo Ramirez MD    ALPRAZolam tablet 0.5 mg, 0.5 mg, Oral, Nightly PRN, Pablo Ramirez MD, 0.5 mg at 02/09/23 0000    aspirin EC tablet 81 mg, 81 mg, Oral, Daily, Pablo Ramirez MD, 81 mg at 02/10/23 0853    balsam peru-castor oiL Oint, , Topical (Top), BID, Pablo Ramirez MD, Given at 02/10/23 0900    clopidogreL tablet 75 mg, 75 mg, Oral, Daily, Pablo Ramirez MD, 75 mg at 02/10/23 0856    dextrose 10% bolus 125 mL 125 mL, 12.5 g, Intravenous, PRN, Pablo Ramirez MD    dextrose 10% bolus 250 mL 250 mL, 25 g, Intravenous, PRN, Pablo Ramirez MD    enoxaparin injection 40 mg, 40 mg, Subcutaneous, Daily, Pablo Ramirez MD, 40 mg at 02/09/23 1806    ezetimibe tablet 10 mg, 10 mg, Oral, Daily, Pablo Ramirez MD, 10 mg at 02/10/23 0854    famotidine tablet 20 mg, 20 mg, Oral, Daily, Pablo Ramirez MD, 20 mg at 02/10/23 0854    fluticasone propionate 50 mcg/actuation nasal spray 50 mcg, 1 spray, Each Nostril, Daily PRN, Pablo Ramirez MD    [START ON 2/11/2023] furosemide tablet 40 mg, 40 mg, Oral, Daily,  Fahad Smith MD    HYDROmorphone PCA syringe 6 mg/30 mL (0.2 mg/mL) NS, , Intravenous, Continuous, Rafa Cardozo MD, Rate Change at 02/09/23 1255    losartan split tablet 12.5 mg, 12.5 mg, Oral, Daily, Pablo Ramirez MD    metoprolol succinate (TOPROL-XL) 24 hr split tablet 12.5 mg, 12.5 mg, Oral, Daily, Pablo Ramirez MD, 12.5 mg at 02/10/23 0855    mupirocin 2 % ointment, , Nasal, BID, Pablo Ramirez MD, Given at 02/10/23 0900    naloxone 0.4 mg/mL injection 0.02 mg, 0.02 mg, Intravenous, PRN, Pablo Ramirez MD    nitroGLYCERIN SL tablet 0.4 mg, 0.4 mg, Sublingual, Q5 Min PRN, Pablo Ramirez MD    nystatin 100,000 unit/mL suspension 500,000 Units, 500,000 Units, Oral, QID, Fahad Smith MD, 500,000 Units at 02/10/23 0853    ondansetron injection 4 mg, 4 mg, Intravenous, Q6H PRN, Pablo Ramirez MD, 4 mg at 02/08/23 1034    polyethylene glycol packet 17 g, 17 g, Oral, Daily, Rafa Cardozo MD, 17 g at 02/10/23 0851    potassium chloride SA CR tablet 40 mEq, 40 mEq, Oral, Once, Fahad Smith MD    prochlorperazine injection Soln 5 mg, 5 mg, Intravenous, Q6H PRN, Pablo Ramirez MD, 5 mg at 02/05/23 1444    sodium chloride 0.9% flush 10 mL, 10 mL, Intravenous, PRN, Pablo Ramirez MD    sodium chloride 0.9% flush 10 mL, 10 mL, Intra-Catheter, PRN, Pablo Ramirez MD    Vital Signs:  Vitals:    02/10/23 0729   BP: (!) 105/55   Pulse: 85   Resp: 20   Temp: 98 °F (36.7 °C)       In/Out:  Intake/Output - Last 3 Shifts         02/08 0700  02/09 0659 02/09 0700  02/10 0659 02/10 0700  02/11 0659    P.O.  120     I.V. (mL/kg) 49 (0.9) 10 (0.2)     Total Intake(mL/kg) 49 (0.9) 130 (2.3)     Urine (mL/kg/hr) 400 (0.3) 350 (0.3)     Emesis/NG output  0     Other  0     Stool 0 0     Total Output 400 350     Net -351 -220            Urine Occurrence  1 x     Stool Occurrence 0 x 0 x     Emesis Occurrence  0 x             Physical Exam:  General: Alert, oriented, in no apparent distress  HEENT:  Sclera anicteric, trachea midline  Lungs: Normal respiratory rate and effort on room air  Abdomen: Soft, appropriate renata-incisional TTP, moderately distended. Incision clean/dry/intact with vertical mattress nylon sutures in place, no surrounding erythema or drainage. LUQ end colostomy pink and well perfused with small volume flatus but no stool or sweat in bag.  Extremities: Warm, well perfused, no edema, SCDs in place  Neuro: Grossly intact, moves all extremities  Psych: Appropriate affect    Laboratory Studies:  Complete Blood Count:  Lab Results   Component Value Date/Time    WBC 2.95 (L) 02/09/2023 02:20 AM    HGB 9.9 (L) 02/09/2023 02:20 AM    HCT 31.6 (L) 02/09/2023 02:20 AM    HCT 46 05/06/2022 09:55 PM    RBC 4.17 02/09/2023 02:20 AM     02/09/2023 02:20 AM       Basic Chemistry Panel:  Lab Results   Component Value Date/Time     (L) 02/10/2023 03:32 AM    K 3.7 02/10/2023 03:32 AM    CL 90 (L) 02/10/2023 03:32 AM    CO2 31 (H) 02/10/2023 03:32 AM    BUN 12 02/10/2023 03:32 AM    CREATININE 0.6 02/10/2023 03:32 AM    CALCIUM 8.4 (L) 02/10/2023 03:32 AM       Hepatic Panel:  Lab Results   Component Value Date/Time    AST 36 02/06/2023 01:15 PM    ALT 23 02/06/2023 01:15 PM    ALKPHOS 116 02/06/2023 01:15 PM    BILITOT 1.4 (H) 02/06/2023 01:15 PM    ALBUMIN 1.7 (L) 02/06/2023 01:15 PM       Coagulation Panel:  Lab Results   Component Value Date/Time    INR 1.2 07/21/2022 04:28 AM       Imaging Studies:  XR Abd (2/8/23):  There is a right-sided ureteral stent identified.  No significant intra-abdominal calcifications noted.  No significant localized bowel dilatation.  Rounded soft tissue density in the left paraspinal area probably related to the previous surgery.    Assessment:  Odette Hart is a 67 y.o. year old female with history of HTN, CAD s/p PCI c/b HFrEF (EF 10%), MDD, debility, severe malnutrition, and open cholecystectomy and takedown of colovesical and colovaginal fistuli (two  separate areas) with sigmoid colectomy, partial cecectomy and appendectomy, drainage of intra-abdominal abscess and end colostomy 5/9/2022 recently admitted 12/22/22 - 12/30/22 for new colocutaneous fistula admitted 1/32/23 with incarcerated colonic prolapse from her fistula s/p manual reduction now s/p open transverse/descending colectomy with takedown of colocutaneous fistula and end colostomy + parastomal hernia repair + lysis of adhesions >60 minutes (Juliane) + debridement of skin/subcutaneos fat/fascia/muscle (Daniel) 2/7/23, now with developing postoperative ileus.    Plan:  - Persistent postoperative ileus, awaiting return of bowel function: continue NPO and HLIV in setting of HFrEF, follow labs for monitoring of fluid status in setting of ongoing diuresis  - Decr dPCA to 0.1 demand in setting of delirium/confusion, hold oxy 5/10mg prn as well as gabapentin in setting of ileus;   - h/o HFrEF: cont home meds with Lasix 40 BID (cont IV in setting of ileus to ensure complete bioavailability), ASA continued throughout perioperative period, Plavix restarted 2/9/23  - [facility] medicine recs for management of multiple comorbidities  - LVX/SCD, ambulation/IS PPX  - PT/OT for postoperative mobilization/ambulation  - WOCN c/s for fresh end colostomy care and repeat teaching  - Dispo: ongoing, COURTNEY Hendrickson, MEGHNAP  Colon and Rectal Surgery

## 2023-02-10 NOTE — ASSESSMENT & PLAN NOTE
Patient has Post-operative ileus which is adynamic in etiology which is improving. This is inherent to her procedure/medications. Will treat conservatively with bowel rest, IV fluids, serial abdominal exams and avoidance of GI paralytics such as narcotics or anti-spasmodics. Monitor patient closely.     -continue management per primary

## 2023-02-10 NOTE — PT/OT/SLP PROGRESS
Occupational Therapy   Treatment    Name: Odette Hart  MRN: [id number]  Admitting Diagnosis:  Colocutaneous fistula  4 Days Post-Op    Recommendations:     Discharge Recommendations: home health OT  Discharge Equipment Recommendations:  none  Barriers to discharge:  Decreased caregiver support    Assessment:     Odette Hart is a 67 y.o. female with a medical diagnosis of Colocutaneous fistula. Performance deficits affecting function are weakness, impaired endurance, impaired self care skills, impaired functional mobility, gait instability, impaired balance. Patient tolerated treatment session, but had a BM (stool) during time of attempt on BSC. Nurse notified.  Patient would benefit from continued skilled acute OT 3x/wk to improve functional mobility, increase independence with ADLs, and address established goals. Recommending HHOT once medically appropriate for discharge to increase maximal independence, reduce burden of care, and ensure safety.     Rehab Prognosis:  Good; patient would benefit from acute skilled OT services to address these deficits and reach maximum level of function.       Plan:     Patient to be seen 3 x/week to address the above listed problems via self-care/home management, therapeutic activities, therapeutic exercises  Plan of Care Expires: 03/07/23  Plan of Care Reviewed with: patient    Subjective     Pain/Comfort:  Pain Rating 1:  (patient did not rate)  Location - Side 1: Bilateral  Location - Orientation 1: generalized  Location 1: abdomen  Pain Addressed 1: Pre-medicate for activity, Reposition, Distraction  Pain Rating Post-Intervention 1:  (patient did not rate)    Objective:     Communicated with: NSG prior to session.  Patient found HOB elevated with all lines intact upon OT entry to room.    General Precautions: Standard, fall    Orthopedic Precautions:N/A  Braces: N/A  Respiratory Status: Nasal cannula, flow 2 L/min     Occupational Performance:     Bed Mobility:     Patient completed Rolling/Turning to Left with  stand by assistance  Patient completed Rolling/Turning to Right with stand by assistance  Patient completed Scooting/Bridging with stand by assistance  Patient completed Supine to Sit with stand by assistance  Patient completed Sit to Supine with stand by assistance     Functional Mobility/Transfers:  Patient completed Sit <> Stand Transfer with contact guard assistance  with  rolling walker   Patient completed Toilet Transfer bed<>BSC functional ambulation technique with CGA with IV pole in tow with  rolling walker. Patient ambulated in hallway to further household mobility distance prior to sitting on BSC.     Activities of Daily Living:  Grooming: stand by assistance oral care and washing face sitting EOB  Upper Body Dressing: minimum assistance Donning and doffing back gown EOB  Lower Body Dressing: total assistance Patient unable to don and doff socks   Toileting: minimum assistance Patient did have a BM while on BSC (not out of ostomy bag). Nurse notified. New Pure[employer]ik reapplied at end of OT session.     [facility] 6 Click ADL: 19    Treatment & Education:    Patient left HOB elevated with all lines intact, call button in reach, bed alarm on, nurse notified, and all needs met.     GOALS:   Multidisciplinary Problems       Occupational Therapy Goals          Problem: Occupational Therapy    Goal Priority Disciplines Outcome Interventions   Occupational Therapy Goal     OT, PT/OT Ongoing, Progressing    Description: Goals to be met by: 2/28/2023    Patient will increase functional independence with ADLs by performing:    UE Dressing with Set-up Assistance.  LE Dressing with Setup.  Grooming while standing at sink with Modified [place].  Toileting from toilet with Modified [place] for hygiene and clothing management.   Supine to sit with Modified [place].  Stand pivot transfers with Modified [place].  Toilet transfer to toilet with Modified  [place].                         Time Tracking:     OT Date of Treatment: 02/10/23  OT Start Time: 0906  OT Stop Time: 1000  OT Total Time (min): 54 min    Billable Minutes:Self Care/Home Management 54               2/10/2023

## 2023-02-11 NOTE — PT/OT/SLP PROGRESS
Physical Therapy      Patient Name:  Odette Hart   MRN:  [id number]    Patient not seen today secondary to Patient unwilling to participate. Patient politely declined on first attempt secondary to pain. Upon second attempt patient found sleeping. Unable to follow up for further attempts this date. Will follow-up per established POC at next available date.

## 2023-02-11 NOTE — SUBJECTIVE & OBJECTIVE
Interval History: patient states she experienced CP last night, but did not inform any staff. It only lasted a few minutes and resolved following nightly alprazolam PRN. She states this occurs when she has anxiety, and it does not feel like her typical anginal pain. Patient was instructed to notify nursing staff of any further episode of CP. Lasix discontinued today.    Review of Systems   Constitutional:  Negative for chills and fever.   HENT:  Negative for congestion, rhinorrhea and sore throat.    Respiratory:  Negative for cough, shortness of breath and wheezing.    Cardiovascular:  Negative for chest pain and palpitations.   Gastrointestinal:  Positive for abdominal pain (improving). Negative for nausea and vomiting.   Endocrine: Negative for polydipsia.   Genitourinary:  Negative for difficulty urinating, dysuria and hematuria.   Neurological:  Negative for dizziness, syncope, weakness, light-headedness and headaches.   Psychiatric/Behavioral:  Negative for agitation and confusion.    Objective:     Vital Signs (Most Recent):  Temp: 98.6 °F (37 °C) (02/11/23 0721)  Pulse: 94 (02/11/23 0721)  Resp: 18 (02/11/23 0721)  BP: (!) 114/54 (02/11/23 0721)  SpO2: 96 % (02/11/23 0721)   Vital Signs (24h Range):  Temp:  [97.6 °F (36.4 °C)-98.6 °F (37 °C)] 98.6 °F (37 °C)  Pulse:  [71-94] 94  Resp:  [16-20] 18  SpO2:  [96 %-100 %] 96 %  BP: ()/(52-58) 114/54     Weight: 56.2 kg (124 lb)  Body mass index is 21.97 kg/m².    Intake/Output Summary (Last 24 hours) at 2/11/2023 0850  Last data filed at 2/11/2023 0632  Gross per 24 hour   Intake --   Output 850 ml   Net -850 ml      Physical Exam  Vitals and nursing note reviewed.   Constitutional:       General: She is not in acute distress.     Appearance: She is ill-appearing.   HENT:      Head: Normocephalic and atraumatic.      Right Ear: External ear normal.      Left Ear: External ear normal.      Nose: No congestion or rhinorrhea.   Eyes:      General:          Right eye: No discharge.         Left eye: No discharge.      Extraocular Movements: Extraocular movements intact.      Pupils: Pupils are equal, round, and reactive to light.   Cardiovascular:      Rate and Rhythm: Normal rate and regular rhythm.      Heart sounds: Normal heart sounds. No murmur heard.  Pulmonary:      Effort: No respiratory distress.      Breath sounds: No wheezing or rales (bibasilar).   Abdominal:      General: There is no distension.      Comments: Colostomy, vertical/midline abdominal excision, bandage clean/dry   Musculoskeletal:         General: No tenderness.      Cervical back: Normal range of motion. No rigidity.      Right lower leg: No edema.      Left lower leg: No edema.   Skin:     Findings: Bruising present. No erythema.   Neurological:      General: No focal deficit present.      Mental Status: She is alert and oriented to person, place, and time.   Psychiatric:         Mood and Affect: Mood normal.         Behavior: Behavior normal.       Significant Labs: All pertinent labs within the past 24 hours have been reviewed.    Significant Imaging: I have reviewed all pertinent imaging results/findings within the past 24 hours.

## 2023-02-11 NOTE — ASSESSMENT & PLAN NOTE
Cardiology consulted for management of ADHF, now signed off  Recent echo showed EF of 10%  Patient hypervolemic on presentation, appears more euvolemic today  Home GDMT: Toprol-XL 25, losartan 25 mg.  Initial CXR without new detrimental changes since prior, BNP 2600 on arrival    Will discontinue lasix today, continue to monitor output    Recommendations:   - discontinue lasix in effort to avoid over diuresis  - replete Mag>2, K>4  - continue Toprol-XL 12.5 per cardiology recommendations  - losartan 25mg qd, decreased to 12.5 mg qd given hypotension  - continue losartan 12.5mg daily per cardiology req  - would avoid IV anti-hypertensives

## 2023-02-11 NOTE — ASSESSMENT & PLAN NOTE
Chronic mild hyponatremia on chart review, now moderate on presentation  Found to be in ADHF on arrival  BNP 2600, volume overloaded, JVD present, bilateral LE edema  CXR with mild pulmonary congestion  Reports poor diet at home (crackers/peanutbutter)  Reduced albumin likely 2/2 to malnutrition    (resolved)    - strict I/Os  - 1000cc fluid restriction  - Na back at patient baseline

## 2023-02-11 NOTE — PROGRESS NOTES
[facility]  Colorectal Surgery  Progress Note    Patient Name: Odette Hart  MRN: [id number]  Admission Date: 1/31/2023  Hospital Length of Stay: 11 days  Attending Physician: Rafa Cardozo MD    Subjective:     Interval History: No acute events overnight. No additional BM per rectum. Tolerating CLD.     Post-Op Info:  Procedure(s) (LRB):  REVISION, COLOSTOMY (N/A)  INSERTION, CATHETER, URETER, BILATERAL (Left)  CYSTOSCOPY, WITH URETERAL STENT INSERTION (Right)  DEBRIDEMENT, ABDOMEN (N/A)  COLECTOMY, PARTIAL  LYSIS, ADHESIONS (N/A)   5 Days Post-Op      Medications:  Continuous Infusions:   hydromorphone in 0.9 % NaCl 6 mg/30 ml       Scheduled Meds:   acetaminophen  1,000 mg Oral Q8H    aspirin  81 mg Oral Daily    balsam peru-castor oiL   Topical (Top) BID    clopidogreL  75 mg Oral Daily    enoxaparin  40 mg Subcutaneous Daily    ezetimibe  10 mg Oral Daily    famotidine  20 mg Oral Daily    losartan  12.5 mg Oral Daily    metoprolol succinate  12.5 mg Oral Daily    mupirocin   Nasal BID    nystatin  500,000 Units Oral QID    polyethylene glycol  17 g Oral Daily    potassium chloride  40 mEq Oral Once     PRN Meds:   albuterol    ALPRAZolam    dextrose 10%    dextrose 10%    fluticasone propionate    naloxone    nitroGLYCERIN    ondansetron    prochlorperazine    sodium chloride 0.9%    sodium chloride 0.9%        Objective:     Vital Signs (Most Recent):  Temp: 98.6 °F (37 °C) (02/11/23 0721)  Pulse: 94 (02/11/23 0721)  Resp: 18 (02/11/23 0721)  BP: (!) 114/54 (02/11/23 0721)  SpO2: 96 % (02/11/23 0721)   Vital Signs (24h Range):  Temp:  [97.6 °F (36.4 °C)-98.6 °F (37 °C)] 98.6 °F (37 °C)  Pulse:  [71-94] 94  Resp:  [16-20] 18  SpO2:  [96 %-100 %] 96 %  BP: ()/(52-58) 114/54     Intake/Output - Last 3 Shifts         02/09 0700  02/10 0659 02/10 0700  02/11 0659 02/11 0700  02/12 0659    P.O. 120      I.V. (mL/kg) 10 (0.2)      Total Intake(mL/kg) 130 (2.3)      Urine (mL/kg/hr) 350 (0.3)  800 (0.6)     Emesis/NG output 0 0     Other 0      Stool 0 50     Total Output 350 850     Net -220 -850            Urine Occurrence 1 x      Stool Occurrence 0 x 2 x     Emesis Occurrence 0 x              Physical Exam  General: Alert, oriented, in no apparent distress  HEENT: Sclera anicteric, trachea midline  Lungs: Normal respiratory rate and effort on room air  Abdomen: Soft, appropriate renata-incisional TTP, moderately distended. Incision clean/dry/intact with vertical mattress nylon sutures in place, no surrounding erythema or drainage. LUQ end colostomy pink and well perfused, air and stool in bag  Extremities: Warm, well perfused, no edema, SCDs in place  Neuro: Grossly intact, moves all extremities  Psych: Appropriate affect    Significant Labs:  BMP:   Recent Labs   Lab 02/11/23 0223   GLU 78   *   K 3.5   CL 88*   CO2 30*   BUN 9   CREATININE 0.6   CALCIUM 8.4*   MG 1.8     CBC:   Recent Labs   Lab 02/09/23 0220   WBC 2.95*   RBC 4.17   HGB 9.9*   HCT 31.6*      MCV 76*   MCH 23.7*   MCHC 31.3*       Significant Diagnostics:  None    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 year old female with history of HTN, CAD s/p PCI c/b HFrEF (EF 10%), MDD, debility, severe malnutrition, and open cholecystectomy and takedown of colovesical and colovaginal fistuli (two separate areas) with sigmoid colectomy,  partial cecectomy and appendectomy, drainage of intra-abdominal abscess and end colostomy 5/9/2022 recently admitted 12/22/22 - 12/30/22 for new colocutaneous fistula admitted 1/32/23 with incarcerated colonic prolapse from her fistula s/p manual reduction now s/p open transverse/descending colectomy with takedown of colocutaneous fistula and end colostomy + parastomal hernia repair + lysis of adhesions >60 minutes (Juliane) + debridement of skin/subcutaneos fat/fascia/muscle (Daniel) 2/7/23, now with developing postoperative ileus.     Plan:  - Advance to regular diet  - DC PCA, transition to PO pain medication with IV push for breakthrough pain  - Encourage OOB  - PT/OT  - Appreciate medicine reccs    JAVIER MOFFETT MD  Colorectal Surgery  [facility]

## 2023-02-11 NOTE — PROGRESS NOTES
[facility]  Progress Note    Patient Name: Odette Hart  MRN: [id number]  Patient Class: IP- Inpatient   Admission Date: 1/31/2023  Length of Stay: 11 days  Attending Physician: Rafa Cardozo MD  Primary Care Provider: Braxton Barragan MD        Subjective:     Principal Problem:Colocutaneous fistula        HPI:  68 yo F with HTN, HLD (statin intolerance), DM, CAD s/p PCI to LAD (2012)/LCX (1/2022), HFrEF (EF10%), debility, severe malnutrition, and the following procedures performed 5/2022: cholecystectomy, takedown of colovesical and colovaginal fistula, sigmoid colectomy, partial cecectomy, end colostomy who presents for colocutaneous fistula repair with abdominal wall reconstruction. On admission she was found to be hyponatremic and in acute on chronic decompensated heart failure. Cardiology was consulted to manage her HF and have since signed off as her volume status has become more euvolemic following diuresis. Medicine was consulted for pr-eop evaluation and continued management of her hyponatremia. Per chart review patient is chronically, mildly hyponatremic at baseline. She takes Lasix, Toprol, spironolactone, and losartan for her HFrEF. She reports correct use of her home medications. She states her diet consists of peanut butter and crackers. Denies CP/SOB/nausea vomiting, cough fever chills, polydipsia. Her Na has been improving with diuresis. Patient has multiple risk factors that complicate surgery: acute on chronic decompensated HF, CAD with PCIx2, hypertension, hyperlipidemia, malnutrition. This being said she does not have reported/documented/observed dysfunction of liver, kidney, lung, clotting.    On examination, she is chronically ill appearing/in no acute distress/at mental baseline. AF, /58 (78), sat 98% on RA, no leukocytosis, hgb 11.8, Hct 38.5, Plts 237, moderate Hyponatremia Na 124 (initially 122 on admission) with labile Baseline, K 3.1, Cl 85, Bun 13,  Cr 00.8, Ca 7.9, albumin 2-3. BNP 2600, UOP 2300 on lasix 80mg    CXR with mild pulmonary congestion      Overview/Hospital Course:  Admitted for planned colocutaneous fistula repair, found to be hyponatremic and in ADHF. Started on IV diuresis, >6 L UOP, returned to euvolemia. Urine/serum studies consistent with hypervolemic hyponatremia most likely in 2/2 to acute on chronic HF. She does have multiple factors that maker her high risk for surgery, primarily her severrely reduced LVEF 10%. Cardiology and medicine consulted for pre-operative evaluation. Agree she is high risk, but need of her procedure outweigh the risks. 2/6 Patient tolerated colocutaneous takedown/reconstruction well. Her Hyponatremia has resolved with Lasix 80mg BID. Patient expressed apprehension with continuing this diuretic dosage.      Interval History: patient states she experienced CP last night, but did not inform any staff. It only lasted a few minutes and resolved following nightly alprazolam PRN. She states this occurs when she has anxiety, and it does not feel like her typical anginal pain. Patient was instructed to notify nursing staff of any further episode of CP. Lasix discontinued today.    Review of Systems   Constitutional:  Negative for chills and fever.   HENT:  Negative for congestion, rhinorrhea and sore throat.    Respiratory:  Negative for cough, shortness of breath and wheezing.    Cardiovascular:  Negative for chest pain and palpitations.   Gastrointestinal:  Positive for abdominal pain (improving). Negative for nausea and vomiting.   Endocrine: Negative for polydipsia.   Genitourinary:  Negative for difficulty urinating, dysuria and hematuria.   Neurological:  Negative for dizziness, syncope, weakness, light-headedness and headaches.   Psychiatric/Behavioral:  Negative for agitation and confusion.    Objective:     Vital Signs (Most Recent):  Temp: 98.6 °F (37 °C) (02/11/23 0721)  Pulse: 94 (02/11/23 0721)  Resp: 18  (02/11/23 0721)  BP: (!) 114/54 (02/11/23 0721)  SpO2: 96 % (02/11/23 0721)   Vital Signs (24h Range):  Temp:  [97.6 °F (36.4 °C)-98.6 °F (37 °C)] 98.6 °F (37 °C)  Pulse:  [71-94] 94  Resp:  [16-20] 18  SpO2:  [96 %-100 %] 96 %  BP: ()/(52-58) 114/54     Weight: 56.2 kg (124 lb)  Body mass index is 21.97 kg/m².    Intake/Output Summary (Last 24 hours) at 2/11/2023 0850  Last data filed at 2/11/2023 0632  Gross per 24 hour   Intake --   Output 850 ml   Net -850 ml      Physical Exam  Vitals and nursing note reviewed.   Constitutional:       General: She is not in acute distress.     Appearance: She is ill-appearing.   HENT:      Head: Normocephalic and atraumatic.      Right Ear: External ear normal.      Left Ear: External ear normal.      Nose: No congestion or rhinorrhea.   Eyes:      General:         Right eye: No discharge.         Left eye: No discharge.      Extraocular Movements: Extraocular movements intact.      Pupils: Pupils are equal, round, and reactive to light.   Cardiovascular:      Rate and Rhythm: Normal rate and regular rhythm.      Heart sounds: Normal heart sounds. No murmur heard.  Pulmonary:      Effort: No respiratory distress.      Breath sounds: No wheezing or rales (bibasilar).   Abdominal:      General: There is no distension.      Comments: Colostomy, vertical/midline abdominal excision, bandage clean/dry   Musculoskeletal:         General: No tenderness.      Cervical back: Normal range of motion. No rigidity.      Right lower leg: No edema.      Left lower leg: No edema.   Skin:     Findings: Bruising present. No erythema.   Neurological:      General: No focal deficit present.      Mental Status: She is alert and oriented to person, place, and time.   Psychiatric:         Mood and Affect: Mood normal.         Behavior: Behavior normal.       Significant Labs: All pertinent labs within the past 24 hours have been reviewed.    Significant Imaging: I have reviewed all pertinent  imaging results/findings within the past 24 hours.      Assessment/Plan:      * Colocutaneous fistula  S/p revision, tolerated well  - managed by CRS    Ischemic cardiomyopathy  Cardiology consulted for management of ADHF, now signed off  Recent echo showed EF of 10%  Patient hypervolemic on presentation, appears more euvolemic today  Home GDMT: Toprol-XL 25, losartan 25 mg.  Initial CXR without new detrimental changes since prior, BNP 2600 on arrival    Will discontinue lasix today, continue to monitor output    Recommendations:   - discontinue lasix in effort to avoid over diuresis  - replete Mag>2, K>4  - continue Toprol-XL 12.5 per cardiology recommendations  - losartan 25mg qd, decreased to 12.5 mg qd given hypotension  - continue losartan 12.5mg daily per cardiology req  - would avoid IV anti-hypertensives    Coronary artery disease  History of Cad with PCIx2 LAD in 2012 and LCX 1/2022  Home regimen: DAPT    No further surgical intervention planned inpatient by CRS or urology.     - continue aspirin  - continue clopidogrel    Hypertension  Pressures are well maintained    - continue Losartan 12.5 mg daily and Metoprolol 12.5 mg daily    Hypercholesterolemia  Lipids well controlled  Has documented statin intolerance    - continue ezetimibe    Ileus  Patient has Post-operative ileus which is adynamic in etiology which is improving. This is inherent to her procedure/medications. Will treat conservatively with bowel rest, IV fluids, serial abdominal exams and avoidance of GI paralytics such as narcotics or anti-spasmodics. Monitor patient closely.     -continue management per primary    Hyponatremia  Chronic mild hyponatremia on chart review, now moderate on presentation  Found to be in ADHF on arrival  BNP 2600, volume overloaded, JVD present, bilateral LE edema  CXR with mild pulmonary congestion  Reports poor diet at home (crackers/peanutbutter)  Reduced albumin likely 2/2 to malnutrition    (resolved)    -  strict I/Os  - 1000cc fluid restriction  - Na back at patient baseline      VTE Risk Mitigation (From admission, onward)         Ordered     enoxaparin injection 40 mg  Daily         02/07/23 1659     Place sequential compression device  Until discontinued         02/06/23 1447     IP VTE HIGH RISK PATIENT  Once         02/06/23 1447                Discharge Planning   SIVAN: 2/12/2023     Code Status: Full Code   Is the patient medically ready for discharge?:     Reason for patient still in hospital (select all that apply): Treatment  Discharge Plan A: Home Health            Fahad Smith MD  Department of Hospital Medicine   [facility]  [facility]

## 2023-02-12 NOTE — SUBJECTIVE & OBJECTIVE
Interval History:   NAEON. No chest pain or pressure. CRS planing to advance diet today. On lasix holiday, anticipate restarting PO lasix 40 mg qd tomorrow. Cr wnl. Na 133->130.     Review of Systems   Constitutional:  Negative for chills and fever.   HENT:  Negative for congestion, rhinorrhea and sore throat.    Respiratory:  Negative for cough, shortness of breath and wheezing.    Cardiovascular:  Negative for chest pain and palpitations.   Gastrointestinal:  Positive for abdominal pain (improving). Negative for nausea and vomiting.   Endocrine: Negative for polydipsia.   Genitourinary:  Negative for difficulty urinating, dysuria and hematuria.   Neurological:  Negative for dizziness, syncope, weakness, light-headedness and headaches.   Psychiatric/Behavioral:  Negative for agitation and confusion.    Objective:     Vital Signs (Most Recent):  Temp: 98.1 °F (36.7 °C) (02/12/23 0756)  Pulse: 82 (02/12/23 0756)  Resp: 16 (02/12/23 0756)  BP: (!) 103/50 (02/12/23 0756)  SpO2: 96 % (02/12/23 0756)   Vital Signs (24h Range):  Temp:  [96.1 °F (35.6 °C)-98.7 °F (37.1 °C)] 98.1 °F (36.7 °C)  Pulse:  [74-86] 82  Resp:  [16-19] 16  SpO2:  [96 %-98 %] 96 %  BP: ()/(50-54) 103/50     Weight: 56.2 kg (124 lb)  Body mass index is 21.97 kg/m².    Intake/Output Summary (Last 24 hours) at 2/12/2023 1024  Last data filed at 2/12/2023 0400  Gross per 24 hour   Intake --   Output 575 ml   Net -575 ml        Physical Exam  Vitals and nursing note reviewed.   Constitutional:       General: She is not in acute distress.     Appearance: She is ill-appearing.   HENT:      Head: Normocephalic and atraumatic.      Right Ear: External ear normal.      Left Ear: External ear normal.      Nose: No congestion or rhinorrhea.   Eyes:      General:         Right eye: No discharge.         Left eye: No discharge.      Extraocular Movements: Extraocular movements intact.      Pupils: Pupils are equal, round, and reactive to light.    Cardiovascular:      Rate and Rhythm: Normal rate and regular rhythm.      Heart sounds: Normal heart sounds. No murmur heard.  Pulmonary:      Effort: No respiratory distress.      Breath sounds: No wheezing or rales (bibasilar).   Abdominal:      General: There is no distension.      Comments: Colostomy, vertical/midline abdominal excision, bandage clean/dry   Musculoskeletal:         General: No tenderness.      Cervical back: Normal range of motion. No rigidity.      Right lower leg: No edema.      Left lower leg: No edema.   Skin:     Findings: Bruising present. No erythema.   Neurological:      General: No focal deficit present.      Mental Status: She is alert and oriented to person, place, and time.   Psychiatric:         Mood and Affect: Mood normal.         Behavior: Behavior normal.       Significant Labs: All pertinent labs within the past 24 hours have been reviewed.    Significant Imaging: I have reviewed all pertinent imaging results/findings within the past 24 hours.

## 2023-02-12 NOTE — ASSESSMENT & PLAN NOTE
Patient has Post-operative ileus which is adynamic in etiology which is improving. This is inherent to her procedure/medications. Will treat conservatively with bowel rest, IV fluids, serial abdominal exams and avoidance of GI paralytics such as narcotics or anti-spasmodics. Monitor patient closely.     Diet advanced 2/12    -continue management per primary CRS

## 2023-02-12 NOTE — PT/OT/SLP PROGRESS
"Physical Therapy Treatment    Patient Name:  Odette Hart   MRN:  [id number]    Recommendations:     Discharge Recommendations: home health OT  Discharge Equipment Recommendations: none  Barriers to discharge: None    Assessment:     Odette Hart is a 67 y.o. female admitted with a medical diagnosis of Colocutaneous fistula.  She presents with the following impairments/functional limitations: weakness, impaired endurance, impaired self care skills, impaired functional mobility, gait instability, impaired balance. Upon first attempt pt requested therapy after lunch, 2nd attempt pt agreed to therapy. Pt required mod A with supine to sit due to pain impairment to abdomen. Upon inspection pt's colostomy bag was half way fill and compact with air in which the nurse came to drain bag. Pt required prolong rest break after supine to sit bed mob 2/2 pain. Pt was able to perform STS with RW and slowly walked to bedside chair. Pt will cont to benefit from skilled acute PT to improve mobility, strength and endurance to return to PLOF.    Rehab Prognosis: Good; patient would benefit from acute skilled PT services to address these deficits and reach maximum level of function.    Recent Surgery: Procedure(s) (LRB):  REVISION, COLOSTOMY (N/A)  INSERTION, CATHETER, URETER, BILATERAL (Left)  CYSTOSCOPY, WITH URETERAL STENT INSERTION (Right)  DEBRIDEMENT, ABDOMEN (N/A)  COLECTOMY, PARTIAL  LYSIS, ADHESIONS (N/A) 6 Days Post-Op    Plan:     During this hospitalization, patient to be seen 3 x/week to address the identified rehab impairments via gait training, therapeutic activities, therapeutic exercises and progress toward the following goals:    Plan of Care Expires:  03/09/23    Subjective     Chief Complaint: "It hurts so much"  Patient/Family Comments/goals: "Thank you for being patient with me"  Pain/Comfort:  Pain Rating 1: 7/10  Location - Side 1: Bilateral  Location - Orientation 1: anterior  Location 1: abdomen  Pain " Addressed 1: Reposition, Distraction, Cessation of Activity, Nurse notified, Pre-medicate for activity      Objective:     Communicated with nurse prior to session.  Patient found HOB elevated with colostomy, oxygen, PureWick, peripheral IV upon PT entry to room.     General Precautions: Standard, fall  Orthopedic Precautions: N/A  Braces: N/A  Respiratory Status: Nasal cannula, flow 2 L/min     Functional Mobility:  Bed Mobility:     Rolling Right: moderate assistance  Scooting: contact guard assistance  Supine to Sit: moderate assistance  Transfers:     Sit to Stand:  minimum assistance with rolling walker  Bed to Chair: minimum assistance with  rolling walker  using  Step Transfer  Gait: 4 steps w/ RW @ min A decreased step length, narrow TACHO, and decrease foot clearance  Balance: Good standing balance with RW      AM-PAC 6 CLICK MOBILITY  Turning over in bed (including adjusting bedclothes, sheets and blankets)?: 2  Moving from lying on back to sitting on the side of the bed?: 2  Moving to and from a bed to a chair (including a wheelchair)?: 3  Need to walk in hospital room?: 3  Climbing 3-5 steps with a railing?:  (not done)       Treatment & Education:  Therapeutic exercises: Heel/toe raises and LAQ's x 10 reps   Pt was educated on progress towards PT goals and safe technique to get out of bed.    Patient left up in chair with all lines intact, call button in reach, and nurse notified..    GOALS:   Multidisciplinary Problems       Physical Therapy Goals          Problem: Physical Therapy    Goal Priority Disciplines Outcome Goal Variances Interventions   Physical Therapy Goal     PT, PT/OT Ongoing, Progressing     Description: Goals to be met by: 2/15/2023     Patient will increase functional independence with mobility by performin. Gait  x 250 feet with Supervision using LRAD.   2. Ascend/descend 4 stair with right Handrails Supervision using no Assistive device.   3. Sit to Stand with Supervision  using LRAD                       Time Tracking:     PT Received On: 02/12/23  PT Start Time: 1330     PT Stop Time: 1412  PT Total Time (min): 42 min     Billable Minutes: Therapeutic Activity 30 and Therapeutic Exercise 12    Treatment Type: Treatment  PT/PTA: PTA     PTA Visit Number: 2     02/12/2023

## 2023-02-12 NOTE — ASSESSMENT & PLAN NOTE
Chronic mild hyponatremia on chart review, now moderate on presentation  Found to be in ADHF on arrival  BNP 2600, volume overloaded, JVD present, bilateral LE edema  CXR with mild pulmonary congestion  Reports poor diet at home (crackers/peanutbutter)  Reduced albumin likely 2/2 to malnutrition    Stable    - strict I/Os  - 1000cc fluid restriction  - daily CMP  - anticipate restarting lasix 2/13

## 2023-02-12 NOTE — ASSESSMENT & PLAN NOTE
S/p colocutaneous fistula revision with intra-op R ureteral stent placement for hydronephrosis. Developed post-op ileus.   Required post-op PCA pump    - management including ostomy and pain managment by CRS

## 2023-02-12 NOTE — RESPIRATORY THERAPY
RAPID RESPONSE RESPIRATORY THERAPY ETCO2 CHECK         Time of visit: 830     Code Status: Full Code   : 1955  Bed: 1060/1060 A:   MRN: [id number]  Time spent at the bedside: < 15 min    SITUATION    Evaluated patient for: ETCo2 compliance    BACKGROUND    Why is the patient in the hospital?: Colocutaneous fistula    Patient has a past medical history of Acute coronary syndrome, Acute exacerbation of chronic obstructive pulmonary disease (COPD), Allergy, Arthritis, Cardiomyopathy, CHF (congestive heart failure), Coronary artery disease, Coronary artery disease of native artery of native heart with stable angina pectoris, Diverticulitis, Diverticulosis, Familial hypercholesterolemia, Former smoker, Heart attack, Heart disease, History of myocardial infarction, Hyperlipidemia, Hypertension, Hypertension, ICD (implantable cardioverter-defibrillator) in place, and Non-ST elevation myocardial infarction (NSTEMI).    24 Hours Vitals Range:  Temp:  [96.1 °F (35.6 °C)-98.5 °F (36.9 °C)]   Pulse:  [74-82]   Resp:  [16-19]   BP: ()/(50-54)   SpO2:  [94 %-98 %]     Labs:    Recent Labs     02/10/23  0332 23  0223 23  0441   * 133* 130*   K 3.7 3.5 3.3*   CL 90* 88* 91*   CO2 31* 30* 28   CREATININE 0.6 0.6 0.6   GLU 91 78 132*   PHOS 2.6* 3.1 2.3*   MG 1.8 1.8 1.7        No results for input(s): PH, PCO2, PO2, HCO3, POCSATURATED, BE in the last 72 hours.    ASSESSMENT/INTERVENTIONS      Last VS   Temp: 98 °F (36.7 °C) (1213)  Pulse: 80 (1213)  Resp: 16 (1213)  BP: 100/51 (1213)  SpO2: 94 % (1213)    Level of Consciousness: Level of Consciousness (AVPU): alert  Respiratory Effort: Respiratory Effort: Normal, Unlabored Expansion/Accessory Muscle Usage: Expansion/Accessory Muscles/Retractions: no use of accessory muscles  All Lung Field Breath Sounds: All Lung Fields Breath Sounds: diminished  Is the ETCO2 monitor on? Yes  Is the patient wearing a cannula? Yes  Are  ETCO2 orders placed? Yes  Is the patient on a PCA pump? Yes  ETCO2 monitored: ETCO2 (mmHg): 53 mmHg  Ambu at bedside: Ambu bag with the patient?: Yes, Adult Ambu    Active Orders   Respiratory Care    Incentive spirometry     Frequency: Q4H     Number of Occurrences: Until Specified     Order Comments: Q4 while awake until discharge.  Educate patient in use; Keep incentive spirometer within reach; and Document incentive spirometer volume every 4 hours while awake.    ((10 sets per hour (3-5 inspirations per set)) on original order)         RECOMMENDATIONS    We recommend: RRT Recs: Continue POC per primary team.      FOLLOW-UP    Please call back the Rapid Response RT, Wood Mai RRT at x [phone] for any questions or concerns.

## 2023-02-12 NOTE — PROGRESS NOTES
[facility] Medicine  Progress Note    Patient Name: Odette Hart  MRN: [id number]  Patient Class: IP- Inpatient   Admission Date: 1/31/2023  Length of Stay: 12 days  Attending Physician: Rafa Cardozo MD  Primary Care Provider: Braxton Barragan MD        Subjective:     Principal Problem:Colocutaneous fistula        HPI:  66 yo F with HTN, HLD (statin intolerance), DM, CAD s/p PCI to LAD (2012)/LCX (1/2022), HFrEF (EF10%), debility, severe malnutrition, and the following procedures performed 5/2022: cholecystectomy, takedown of colovesical and colovaginal fistula, sigmoid colectomy, partial cecectomy, end colostomy who presents for colocutaneous fistula repair with abdominal wall reconstruction. On admission she was found to be hyponatremic and in acute on chronic decompensated heart failure. Cardiology was consulted to manage her HF and have since signed off as her volume status has become more euvolemic following diuresis. Medicine was consulted for pr-eop evaluation and continued management of her hyponatremia. Per chart review patient is chronically, mildly hyponatremic at baseline. She takes Lasix, Toprol, spironolactone, and losartan for her HFrEF. She reports correct use of her home medications. She states her diet consists of peanut butter and crackers. Denies CP/SOB/nausea vomiting, cough fever chills, polydipsia. Her Na has been improving with diuresis. Patient has multiple risk factors that complicate surgery: acute on chronic decompensated HF, CAD with PCIx2, hypertension, hyperlipidemia, malnutrition. This being said she does not have reported/documented/observed dysfunction of liver, kidney, lung, clotting.    On examination, she is chronically ill appearing/in no acute distress/at mental baseline. AF, /58 (78), sat 98% on RA, no leukocytosis, hgb 11.8, Hct 38.5, Plts 237, moderate Hyponatremia Na 124 (initially 122 on admission) with labile Baseline, K 3.1, Cl 85, Bun 13,  Cr 00.8, Ca 7.9, albumin 2-3. BNP 2600, UOP 2300 on lasix 80mg    CXR with mild pulmonary congestion      Overview/Hospital Course:  Admitted for planned colocutaneous fistula repair, found to be hyponatremic and in ADHF. Started on IV diuresis, >6 L UOP, returned to euvolemia. Urine/serum studies consistent with hypervolemic hyponatremia most likely in 2/2 to acute on chronic HF. She does have multiple factors that maker her high risk for surgery, primarily her severrely reduced LVEF 10%. Cardiology and medicine consulted for pre-operative evaluation. Agree she is high risk, but need of her procedure outweigh the risks. 2/6 Patient tolerated colocutaneous takedown/reconstruction well. Her Hyponatremia has resolved with Lasix 80mg BID. S/p colocutaneous fistula revision with intra-op R ureteral stent placement for hydronephrosis. Developed post-op ileus. Given lasix holiday for over diuresis. Plavix, ASA restarted post-op. Anticipate discharge on prior home PO lasix dosing.       Interval History:   NAEON. No chest pain or pressure. CRS planing to advance diet today. On lasix holiday, anticipate restarting PO lasix 40 mg qd tomorrow. Cr wnl. Na 133->130.     Review of Systems   Constitutional:  Negative for chills and fever.   HENT:  Negative for congestion, rhinorrhea and sore throat.    Respiratory:  Negative for cough, shortness of breath and wheezing.    Cardiovascular:  Negative for chest pain and palpitations.   Gastrointestinal:  Positive for abdominal pain (improving). Negative for nausea and vomiting.   Endocrine: Negative for polydipsia.   Genitourinary:  Negative for difficulty urinating, dysuria and hematuria.   Neurological:  Negative for dizziness, syncope, weakness, light-headedness and headaches.   Psychiatric/Behavioral:  Negative for agitation and confusion.    Objective:     Vital Signs (Most Recent):  Temp: 98.1 °F (36.7 °C) (02/12/23 0756)  Pulse: 82 (02/12/23 0756)  Resp: 16 (02/12/23  0756)  BP: (!) 103/50 (02/12/23 0756)  SpO2: 96 % (02/12/23 0756)   Vital Signs (24h Range):  Temp:  [96.1 °F (35.6 °C)-98.7 °F (37.1 °C)] 98.1 °F (36.7 °C)  Pulse:  [74-86] 82  Resp:  [16-19] 16  SpO2:  [96 %-98 %] 96 %  BP: ()/(50-54) 103/50     Weight: 56.2 kg (124 lb)  Body mass index is 21.97 kg/m².    Intake/Output Summary (Last 24 hours) at 2/12/2023 1024  Last data filed at 2/12/2023 0400  Gross per 24 hour   Intake --   Output 575 ml   Net -575 ml        Physical Exam  Vitals and nursing note reviewed.   Constitutional:       General: She is not in acute distress.     Appearance: She is ill-appearing.   HENT:      Head: Normocephalic and atraumatic.      Right Ear: External ear normal.      Left Ear: External ear normal.      Nose: No congestion or rhinorrhea.   Eyes:      General:         Right eye: No discharge.         Left eye: No discharge.      Extraocular Movements: Extraocular movements intact.      Pupils: Pupils are equal, round, and reactive to light.   Cardiovascular:      Rate and Rhythm: Normal rate and regular rhythm.      Heart sounds: Normal heart sounds. No murmur heard.  Pulmonary:      Effort: No respiratory distress.      Breath sounds: No wheezing or rales (bibasilar).   Abdominal:      General: There is no distension.      Comments: Colostomy, vertical/midline abdominal excision, bandage clean/dry   Musculoskeletal:         General: No tenderness.      Cervical back: Normal range of motion. No rigidity.      Right lower leg: No edema.      Left lower leg: No edema.   Skin:     Findings: Bruising present. No erythema.   Neurological:      General: No focal deficit present.      Mental Status: She is alert and oriented to person, place, and time.   Psychiatric:         Mood and Affect: Mood normal.         Behavior: Behavior normal.       Significant Labs: All pertinent labs within the past 24 hours have been reviewed.    Significant Imaging: I have reviewed all pertinent imaging  results/findings within the past 24 hours.      Assessment/Plan:      * Colocutaneous fistula  S/p colocutaneous fistula revision with intra-op R ureteral stent placement for hydronephrosis. Developed post-op ileus.   Required post-op PCA pump    - management including ostomy and pain managment by CRS    Ischemic cardiomyopathy  Cardiology consulted for management of ADHF, now signed off  Recent echo showed EF of 10%  Patient hypervolemic on presentation, appears more euvolemic today  Home GDMT: Toprol-XL 25, losartan 25 mg.  Initial CXR without new detrimental changes since prior, BNP 2600 on arrival    Recommendations:   - on lasix holiday, anticipate restarting PO lasix 40 mg qd on 2/13  - anticipate discharge on pta PO lasix regimen  - replete Mag>2, K>4  - continue Toprol-XL 12.5 per cardiology recommendations  - losartan 25mg qd, decreased to 12.5 mg qd given hypotension  - continue losartan 12.5mg daily per cardiology req  - would avoid IV anti-hypertensive  - holding home spironolactone, unable to tolerate full HFrEF GDMT    Coronary artery disease  History of Cad with PCIx2 LAD in 2012 and LCX 1/2022  Home regimen: DAPT    No further surgical intervention planned inpatient by CRS or urology.     - continue aspirin  - continue clopidogrel    Ileus  Patient has Post-operative ileus which is adynamic in etiology which is improving. This is inherent to her procedure/medications. Will treat conservatively with bowel rest, IV fluids, serial abdominal exams and avoidance of GI paralytics such as narcotics or anti-spasmodics. Monitor patient closely.     Diet advanced 2/12    -continue management per primary CRS    Hyponatremia  Chronic mild hyponatremia on chart review, now moderate on presentation  Found to be in ADHF on arrival  BNP 2600, volume overloaded, JVD present, bilateral LE edema  CXR with mild pulmonary congestion  Reports poor diet at home (crackers/peanutbutter)  Reduced albumin likely 2/2 to  malnutrition    Stable    - strict I/Os  - 1000cc fluid restriction  - daily CMP  - anticipate restarting lasix 2/13    Hypertension  Intermittent difficulties with hypotension while inpatient. Unable to tolerate full HFrEF GDMT.   Stable.    - continue Losartan 12.5 mg daily and Metoprolol 12.5 mg daily    Hypercholesterolemia  Lipids well controlled  Has documented statin intolerance    - continue ezetimibe      VTE Risk Mitigation (From admission, onward)         Ordered     enoxaparin injection 40 mg  Daily         02/07/23 1659     Place sequential compression device  Until discontinued         02/06/23 1447     IP VTE HIGH RISK PATIENT  Once         02/06/23 1447                Discharge Planning   SIVAN: 2/12/2023     Code Status: Full Code   Is the patient medically ready for discharge?:     Reason for patient still in hospital (select all that apply): Treatment  Discharge Plan A: Home Health            Emma Quintanilla MD  Department of Hospital Medicine   [facility]

## 2023-02-12 NOTE — PLAN OF CARE
Problem: Adult Inpatient Plan of Care  Goal: Plan of Care Review  Outcome: Ongoing, Progressing     Problem: Adult Inpatient Plan of Care  Goal: Optimal Comfort and Wellbeing  Outcome: Ongoing, Progressing     Problem: Fall Injury Risk  Goal: Absence of Fall and Fall-Related Injury  Outcome: Ongoing, Progressing   Pt rested well overnight. No acute distress noted, colostomy intact to LLQ ABD. Soft stool output noted. Pt denies nausea/vomiting and only reports abd discomfort with movement. PCA pump in use pain managed effectively. Pt upset that Xanax was dced.

## 2023-02-12 NOTE — ASSESSMENT & PLAN NOTE
Cardiology consulted for management of ADHF, now signed off  Recent echo showed EF of 10%  Patient hypervolemic on presentation, appears more euvolemic today  Home GDMT: Toprol-XL 25, losartan 25 mg.  Initial CXR without new detrimental changes since prior, BNP 2600 on arrival    Recommendations:   - on lasix holiday, anticipate restarting PO lasix 40 mg qd on 2/13  - anticipate discharge on pta PO lasix regimen  - replete Mag>2, K>4  - continue Toprol-XL 12.5 per cardiology recommendations  - losartan 25mg qd, decreased to 12.5 mg qd given hypotension  - continue losartan 12.5mg daily per cardiology req  - would avoid IV anti-hypertensive  - holding home spironolactone, unable to tolerate full HFrEF GDMT

## 2023-02-12 NOTE — ASSESSMENT & PLAN NOTE
Intermittent difficulties with hypotension while inpatient. Unable to tolerate full HFrEF GDMT.   Stable.    - continue Losartan 12.5 mg daily and Metoprolol 12.5 mg daily

## 2023-02-12 NOTE — PROGRESS NOTES
[facility]  Colorectal Surgery  Progress Note    Patient Name: Odette Hart  MRN: [id number]  Admission Date: 1/31/2023  Hospital Length of Stay: 12 days  Attending Physician: Rafa Cardozo MD    Subjective:     Interval History: No acute events overnight. Urine output appears cloudy and dark. Doing well overall. Did not receive regular diet yesterday.      Post-Op Info:  Procedure(s) (LRB):  REVISION, COLOSTOMY (N/A)  INSERTION, CATHETER, URETER, BILATERAL (Left)  CYSTOSCOPY, WITH URETERAL STENT INSERTION (Right)  DEBRIDEMENT, ABDOMEN (N/A)  COLECTOMY, PARTIAL  LYSIS, ADHESIONS (N/A)   6 Days Post-Op      Medications:  Continuous Infusions:      Scheduled Meds:   acetaminophen  1,000 mg Oral Q8H    aspirin  81 mg Oral Daily    balsam peru-castor oiL   Topical (Top) BID    clopidogreL  75 mg Oral Daily    enoxaparin  40 mg Subcutaneous Daily    ezetimibe  10 mg Oral Daily    famotidine  20 mg Oral Daily    losartan  12.5 mg Oral Daily    magnesium sulfate IVPB  2 g Intravenous Once    metoprolol succinate  12.5 mg Oral Daily    nystatin  500,000 Units Oral QID    polyethylene glycol  17 g Oral Daily    potassium chloride  40 mEq Oral Once     PRN Meds:   albuterol    dextrose 10%    dextrose 10%    fluticasone propionate    naloxone    nitroGLYCERIN    ondansetron    oxyCODONE    oxyCODONE    prochlorperazine    sodium chloride 0.9%    sodium chloride 0.9%        Objective:     Vital Signs (Most Recent):  Temp: 98.1 °F (36.7 °C) (02/12/23 0756)  Pulse: 82 (02/12/23 0756)  Resp: 16 (02/12/23 0756)  BP: (!) 103/50 (02/12/23 0756)  SpO2: 96 % (02/12/23 0756)   Vital Signs (24h Range):  Temp:  [96.1 °F (35.6 °C)-98.7 °F (37.1 °C)] 98.1 °F (36.7 °C)  Pulse:  [74-86] 82  Resp:  [16-19] 16  SpO2:  [96 %-98 %] 96 %  BP: ()/(50-54) 103/50     Intake/Output - Last 3 Shifts         02/10 0700  02/11 0659 02/11 0700  02/12 0659 02/12 0700  02/13 0659    P.O.       I.V. (mL/kg)       Total Intake(mL/kg)        Urine (mL/kg/hr) 800 (0.6) 350 (0.3)     Emesis/NG output 0      Other       Stool 50 225     Total Output 850 575     Net -850 -575            Urine Occurrence  3 x     Stool Occurrence 2 x 1 x             Physical Exam  General: Alert, oriented, in no apparent distress  HEENT: Sclera anicteric, trachea midline  Lungs: Normal respiratory rate and effort on room air  Abdomen: Soft, appropriate renata-incisional TTP, moderately distended. Incision clean/dry/intact with vertical mattress nylon sutures in place, no surrounding erythema or drainage. LUQ end colostomy pink and well perfused, air and stool in bag  Extremities: Warm, well perfused, no edema, SCDs in place  Neuro: Grossly intact, moves all extremities  Psych: Appropriate affect    Significant Labs:  BMP:   Recent Labs   Lab 02/12/23  0441   *   *   K 3.3*   CL 91*   CO2 28   BUN 7*   CREATININE 0.6   CALCIUM 7.9*   MG 1.7       CBC:   Recent Labs   Lab 02/09/23  0220   WBC 2.95*   RBC 4.17   HGB 9.9*   HCT 31.6*      MCV 76*   MCH 23.7*   MCHC 31.3*         Significant Diagnostics:  None    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 year old female with history of HTN, CAD s/p PCI c/b HFrEF (EF 10%), MDD, debility, severe malnutrition, and open cholecystectomy and takedown of colovesical and colovaginal fistuli (two separate areas) with sigmoid  colectomy, partial cecectomy and appendectomy, drainage of intra-abdominal abscess and end colostomy 5/9/2022 recently admitted 12/22/22 - 12/30/22 for new colocutaneous fistula admitted 1/32/23 with incarcerated colonic prolapse from her fistula s/p manual reduction now s/p open transverse/descending colectomy with takedown of colocutaneous fistula and end colostomy + parastomal hernia repair + lysis of adhesions >60 minutes (Juliane) + debridement of skin/subcutaneos fat/fascia/muscle (Daniel) 2/7/23, now with developing postoperative ileus.     Plan:  - UA  - Advance to regular diet  - DC PCA, transition to PO pain medication  - Encourage OOB  - PT/OT  - Appreciate medicine reccs    Brandi Sweeney MD  Colorectal Surgery  [facility]

## 2023-02-13 PROBLEM — N30.01 ACUTE CYSTITIS WITH HEMATURIA: Status: ACTIVE | Noted: 2023-01-01

## 2023-02-13 NOTE — ASSESSMENT & PLAN NOTE
S/p colocutaneous fistula revision with intra-op R ureteral stent placement for hydronephrosis. Developed post-op ileus.   Required post-op PCA pump now discontinued and on PRN opioids    - management including ostomy, bowel regimen and pain managment by CRS

## 2023-02-13 NOTE — ASSESSMENT & PLAN NOTE
Patient with new onset dysuria and urine changes. Previous history of UTI with resistance on culture including VRE, enterobacter, klebsiella and pseudomonas.     Plan  - continue cipro and plan for 10-14 day course  - if hypotension worsens or patient febrile would recommend repeat blood cultures, lactic acid and would broaden to meropenem given previous resistance to cefepime and zosyn

## 2023-02-13 NOTE — PROGRESS NOTES
[facility] Medicine  Progress Note    Patient Name: Odette Hart  MRN: [id number]  Patient Class: IP- Inpatient   Admission Date: 1/31/2023  Length of Stay: 13 days  Attending Physician: Rafa Cardozo MD  Primary Care Provider: Braxton Barragan MD        Subjective:     Principal Problem:Colocutaneous fistula        HPI:  68 yo F with HTN, HLD (statin intolerance), DM, CAD s/p PCI to LAD (2012)/LCX (1/2022), HFrEF (EF10%), debility, severe malnutrition, and the following procedures performed 5/2022: cholecystectomy, takedown of colovesical and colovaginal fistula, sigmoid colectomy, partial cecectomy, end colostomy who presents for colocutaneous fistula repair with abdominal wall reconstruction. On admission she was found to be hyponatremic and in acute on chronic decompensated heart failure. Cardiology was consulted to manage her HF and have since signed off as her volume status has become more euvolemic following diuresis. Medicine was consulted for pr-eop evaluation and continued management of her hyponatremia. Per chart review patient is chronically, mildly hyponatremic at baseline. She takes Lasix, Toprol, spironolactone, and losartan for her HFrEF. She reports correct use of her home medications. She states her diet consists of peanut butter and crackers. Denies CP/SOB/nausea vomiting, cough fever chills, polydipsia. Her Na has been improving with diuresis. Patient has multiple risk factors that complicate surgery: acute on chronic decompensated HF, CAD with PCIx2, hypertension, hyperlipidemia, malnutrition. This being said she does not have reported/documented/observed dysfunction of liver, kidney, lung, clotting.    On examination, she is chronically ill appearing/in no acute distress/at mental baseline. AF, /58 (78), sat 98% on RA, no leukocytosis, hgb 11.8, Hct 38.5, Plts 237, moderate Hyponatremia Na 124 (initially 122 on admission) with labile Baseline, K 3.1, Cl 85, Bun 13,  Cr 00.8, Ca 7.9, albumin 2-3. BNP 2600, UOP 2300 on lasix 80mg    CXR with mild pulmonary congestion      Overview/Hospital Course:  Admitted for planned colocutaneous fistula repair, found to be hyponatremic and in ADHF. Started on IV diuresis, >6 L UOP, returned to euvolemia. Urine/serum studies consistent with hypervolemic hyponatremia most likely in 2/2 to acute on chronic HF. She does have multiple factors that maker her high risk for surgery, primarily her severrely reduced LVEF 10%. Cardiology and medicine consulted for pre-operative evaluation. Agree she is high risk, but need of her procedure outweigh the risks. 2/6 Patient tolerated colocutaneous takedown/reconstruction well. Her Hyponatremia has resolved with Lasix 80mg BID. S/p colocutaneous fistula revision with intra-op R ureteral stent placement for hydronephrosis. Developed post-op ileus. Given lasix holiday for over diuresis. Plavix, ASA restarted post-op. Anticipate discharge on prior home PO lasix dosing.     Complaint of dysuria and dark cloudy urine noted by primary team, patient started on cipro. Continued afebrile and low normal BP readings. Recently discontinued from PCA pump with PRN pain management.       Interval History:   NAEON. No chest pain or pressure. PCA recently discontinued. On lasix holiday, anticipate restarting PO lasix 40 mg qd tomorrow. Cr wnl. Na 131. K and Mag replaced today. On Cipro for UTI.      Review of Systems   Constitutional:  Negative for chills and fever.   HENT:  Negative for congestion, rhinorrhea and sore throat.    Respiratory:  Negative for cough, shortness of breath and wheezing.    Cardiovascular:  Negative for chest pain and palpitations.   Gastrointestinal:  Positive for abdominal pain (improving). Negative for nausea and vomiting.   Endocrine: Negative for polydipsia.   Genitourinary:  Negative for difficulty urinating, dysuria and hematuria.   Neurological:  Negative for dizziness, syncope, weakness,  light-headedness and headaches.   Psychiatric/Behavioral:  Negative for agitation and confusion.    Objective:     Vital Signs (Most Recent):  Temp: 98 °F (36.7 °C) (02/13/23 1248)  Pulse: 75 (02/13/23 1248)  Resp: 16 (02/13/23 1248)  BP: (!) 92/55 (02/13/23 1248)  SpO2: (!) 93 % (02/13/23 1248)   Vital Signs (24h Range):  Temp:  [97.6 °F (36.4 °C)-98.6 °F (37 °C)] 98 °F (36.7 °C)  Pulse:  [75-98] 75  Resp:  [16-20] 16  SpO2:  [93 %-98 %] 93 %  BP: ()/(50-55) 92/55     Weight: 56.2 kg (124 lb)  Body mass index is 21.97 kg/m².    Intake/Output Summary (Last 24 hours) at 2/13/2023 1311  Last data filed at 2/13/2023 0600  Gross per 24 hour   Intake --   Output 920 ml   Net -920 ml        Physical Exam  Vitals and nursing note reviewed.   Constitutional:       General: She is not in acute distress.     Appearance: She is ill-appearing.   HENT:      Head: Normocephalic and atraumatic.      Right Ear: External ear normal.      Left Ear: External ear normal.      Nose: No congestion or rhinorrhea.   Eyes:      General:         Right eye: No discharge.         Left eye: No discharge.      Extraocular Movements: Extraocular movements intact.      Pupils: Pupils are equal, round, and reactive to light.   Cardiovascular:      Rate and Rhythm: Normal rate and regular rhythm.      Heart sounds: Normal heart sounds. No murmur heard.  Pulmonary:      Effort: No respiratory distress.      Breath sounds: No wheezing or rales (bibasilar).   Abdominal:      General: There is no distension.      Comments: Colostomy, vertical/midline abdominal excision, bandage clean/dry   Musculoskeletal:         General: No tenderness.      Cervical back: Normal range of motion. No rigidity.      Right lower leg: No edema.      Left lower leg: No edema.   Skin:     Findings: Bruising present. No erythema.   Neurological:      General: No focal deficit present.      Mental Status: She is alert and oriented to person, place, and time.    Psychiatric:         Mood and Affect: Mood normal.         Behavior: Behavior normal.       Significant Labs: All pertinent labs within the past 24 hours have been reviewed.    Significant Imaging: I have reviewed all pertinent imaging results/findings within the past 24 hours.      Assessment/Plan:      * Colocutaneous fistula  S/p colocutaneous fistula revision with intra-op R ureteral stent placement for hydronephrosis. Developed post-op ileus.   Required post-op PCA pump now discontinued and on PRN opioids    - management including ostomy, bowel regimen and pain managment by CRS    Acute cystitis with hematuria  Patient with new onset dysuria and urine changes. Previous history of UTI with resistance on culture including VRE, enterobacter, klebsiella and pseudomonas.     Plan  - continue cipro and plan for 10-14 day course  - if hypotension worsens or patient febrile would recommend repeat blood cultures, lactic acid and would broaden to meropenem given previous resistance to cefepime and zosyn       Ileus  Patient has Post-operative ileus which is adynamic in etiology which is improving. This is inherent to her procedure/medications. Will treat conservatively with bowel rest, IV fluids, serial abdominal exams and avoidance of GI paralytics such as narcotics or anti-spasmodics. Monitor patient closely.     Diet advanced 2/12    -continue management per primary CRS    Hyponatremia  Chronic mild hyponatremia on chart review, now moderate on presentation  Found to be in ADHF on arrival  BNP 2600, volume overloaded, JVD present, bilateral LE edema on consultation  CXR with mild pulmonary congestion  Reports poor diet at home (crackers/peanutbutter)  Reduced albumin likely 2/2 to malnutrition    Stable and has improved. Asymptomatic     - strict I/Os  - 1000cc fluid restriction  - daily CMP  - replace electrolytes  - anticipate restarting lasix 2/14    Hypertension  Intermittent difficulties with hypotension while  inpatient. Unable to tolerate full HFrEF GDMT.   Stable.    - continue Losartan 12.5 mg daily and Metoprolol 12.5 mg daily  - Hold both if SBP<100    Hypercholesterolemia  Lipids well controlled  Has documented statin intolerance    - continue ezetimibe    Coronary artery disease  History of Cad with PCIx2 LAD in 2012 and LCX 1/2022  Home regimen: DAPT    No further surgical intervention planned inpatient by CRS or urology.     - continue aspirin  - continue clopidogrel    Ischemic cardiomyopathy  Cardiology consulted for management of ADHF, now signed off  Recent echo showed EF of 10%  Patient hypervolemic on presentation, appears more euvolemic today  Home GDMT: Toprol-XL 25, losartan 25 mg.  Initial CXR without new detrimental changes since prior, BNP 2600 on arrival    Recommendations:   - on lasix holiday, anticipate restarting PO lasix 40 mg qd on 2/14  - anticipate discharge on pta PO lasix regimen  - replete Mag>2, K>4  - continue Toprol-XL 12.5 per cardiology recommendations  - losartan 25mg qd, decreased to 12.5 mg qd given hypotension  - continue losartan 12.5mg daily per cardiology req  - will include hold parameters for hypotension  - would avoid IV anti-hypertensive  - holding home spironolactone, unable to tolerate full HFrEF GDMT      VTE Risk Mitigation (From admission, onward)         Ordered     enoxaparin injection 40 mg  Daily         02/07/23 1659     Place sequential compression device  Until discontinued         02/06/23 1447     IP VTE HIGH RISK PATIENT  Once         02/06/23 1447                Discharge Planning   SIVAN: 2/12/2023     Code Status: Full Code   Is the patient medically ready for discharge?:     Reason for patient still in hospital (select all that apply): Treatment  Discharge Plan A: Home Health                  Ml Lagos DO  Department of Hospital Medicine   [facility]

## 2023-02-13 NOTE — ASSESSMENT & PLAN NOTE
Cardiology consulted for management of ADHF, now signed off  Recent echo showed EF of 10%  Patient hypervolemic on presentation, appears more euvolemic today  Home GDMT: Toprol-XL 25, losartan 25 mg.  Initial CXR without new detrimental changes since prior, BNP 2600 on arrival    Recommendations:   - on lasix holiday, anticipate restarting PO lasix 40 mg qd on 2/14  - anticipate discharge on pta PO lasix regimen  - replete Mag>2, K>4  - continue Toprol-XL 12.5 per cardiology recommendations  - losartan 25mg qd, decreased to 12.5 mg qd given hypotension  - continue losartan 12.5mg daily per cardiology req  - will include hold parameters for hypotension  - would avoid IV anti-hypertensive  - holding home spironolactone, unable to tolerate full HFrEF GDMT

## 2023-02-13 NOTE — PLAN OF CARE
"   02/13/23 1556   Post-Acute Status   Post-Acute Authorization Home Health   Home Health Status Pending Payor Review   Discharge Plan   Discharge Plan A Home Health     CM manually faxed home health set up request to [facility].      4:19 PM  2/13/2023 3:56:28 PM Transmission Record   Sent to [phone] with remote ID "[email or web address]"   Result: (0/339;0/0) Success   Page record: 1 - 25   Elapsed time: 10:41 on channel 59    Will f/u with PHN for receipt of HH request-    1:40 PM  12/14/23  Notified team that HH orders are needed, but patient is not medically ready to be discharged.      Eneida Hassan RN  Case Management  [facility]  [phone]    "

## 2023-02-13 NOTE — PROGRESS NOTES
Patient Name: Odette Hart  Date: 2/13/2023  Service: Colon and Rectal Surgery    Subjective:  NAEO. Tolerating small vol low fiber diet without nausea/vomiting or belching/hiccups/heartburn. Passing soft/solid stool into bag with flatus, denies further episodes of rectal stool. Voiding freely with persistent dysuria. Denies fevers/chills/sweats, lightheadedness/dizziness, chest pain/shortness of breath, upper or lower extremity edema/pain.     Medications:    Current Facility-Administered Medications:     [COMPLETED] acetaminophen 1,000 mg/100 mL (10 mg/mL) injection 1,000 mg, 1,000 mg, Intravenous, Q8H, Stopped at 02/07/23 0527 **FOLLOWED BY** acetaminophen tablet 1,000 mg, 1,000 mg, Oral, Q8H, Pablo Ramirez MD, 1,000 mg at 02/13/23 1446    albuterol inhaler 2 puff, 2 puff, Inhalation, Q6H PRN, Pablo Ramirez MD    ALPRAZolam tablet 0.5 mg, 0.5 mg, Oral, Nightly PRN, Rafa Cardozo MD, 0.5 mg at 02/12/23 2107    aspirin EC tablet 81 mg, 81 mg, Oral, Daily, Pablo Ramirez MD, 81 mg at 02/13/23 0835    balsam peru-castor oiL Oint, , Topical (Top), BID, Pablo Ramirez MD, Given at 02/13/23 0838    ciprofloxacin HCl tablet 500 mg, 500 mg, Oral, Q12H, Ana Dalal MD, 500 mg at 02/13/23 0836    clopidogreL tablet 75 mg, 75 mg, Oral, Daily, Pablo Ramirez MD, 75 mg at 02/13/23 0835    dextrose 10% bolus 125 mL 125 mL, 12.5 g, Intravenous, PRN, Pablo Ramirez MD    dextrose 10% bolus 250 mL 250 mL, 25 g, Intravenous, PRN, Pablo Ramirez MD    enoxaparin injection 40 mg, 40 mg, Subcutaneous, Daily, Pablo Ramirez MD, 40 mg at 02/13/23 1638    ezetimibe tablet 10 mg, 10 mg, Oral, Daily, Pablo Ramirez MD, 10 mg at 02/13/23 0835    famotidine tablet 20 mg, 20 mg, Oral, Daily, Pablo Ramirez MD, 20 mg at 02/13/23 0835    fluticasone propionate 50 mcg/actuation nasal spray 50 mcg, 1 spray, Each Nostril, Daily PRN, Pablo Ramirez MD    losartan split tablet 12.5 mg, 12.5 mg, Oral,  Daily, Pablo Ramirez MD, 12.5 mg at 02/13/23 0835    metoprolol succinate (TOPROL-XL) 24 hr split tablet 12.5 mg, 12.5 mg, Oral, Daily, Pablo Ramirez MD, 12.5 mg at 02/13/23 0834    naloxone 0.4 mg/mL injection 0.02 mg, 0.02 mg, Intravenous, PRN, Pablo Ramirez MD    nitroGLYCERIN SL tablet 0.4 mg, 0.4 mg, Sublingual, Q5 Min PRN, Pablo Ramirez MD    nystatin 100,000 unit/mL suspension 500,000 Units, 500,000 Units, Oral, QID, Fahad Smith MD, 500,000 Units at 02/13/23 1638    ondansetron injection 4 mg, 4 mg, Intravenous, Q6H PRN, Pablo Ramirez MD, 4 mg at 02/10/23 1742    oxyCODONE immediate release tablet 5 mg, 5 mg, Oral, Q6H PRN, Brandi Sweeney MD    oxyCODONE immediate release tablet Tab 10 mg, 10 mg, Oral, Q6H PRN, Brandi Sweeney MD, 10 mg at 02/13/23 1447    polyethylene glycol packet 17 g, 17 g, Oral, Daily, Rafa Cardozo MD, 17 g at 02/13/23 0836    potassium bicarbonate disintegrating tablet 40 mEq, 40 mEq, Oral, Q4H, Kirstin Lima, DO, 40 mEq at 02/13/23 1446    prochlorperazine injection Soln 5 mg, 5 mg, Intravenous, Q6H PRN, Pablo Ramirez MD, 5 mg at 02/05/23 1444    sodium chloride 0.9% flush 10 mL, 10 mL, Intravenous, PRN, Pablo Ramirez MD    sodium chloride 0.9% flush 10 mL, 10 mL, Intra-Catheter, PRN, Pablo Ramirez MD    Vital Signs:  Vitals:    02/13/23 1533   BP: (!) 96/53   Pulse: 81   Resp: 18   Temp: 97.3 °F (36.3 °C)       In/Out:  Intake/Output - Last 3 Shifts         02/11 0700 02/12 0659 02/12 0700 02/13 0659 02/13 0700 02/14 0659    Urine (mL/kg/hr) 350 (0.3) 920 (0.7)     Emesis/NG output       Stool 225 250 75    Total Output 575 1170 75    Net -575 -1170 -75           Urine Occurrence 3 x 4 x     Stool Occurrence 1 x 0 x 0 x            Physical Exam:  General: Alert, oriented, in no apparent distress  HEENT: Sclera anicteric, trachea midline  Lungs: Normal respiratory rate and effort on room air  Abdomen: Soft, appropriate renata-incisional TTP,  moderately distended. Incision clean/dry/intact with vertical mattress nylon sutures in place, mild erythema along right upper aspect of incision (adjacent to colostomy) without tenderness or fluctuance. LUQ end colostomy pink and well perfused with soft stool and flatus in bag.  Extremities: Warm, well perfused, no edema, SCDs in place  Neuro: Grossly intact, moves all extremities  Psych: Appropriate affect    Laboratory Studies:  Complete Blood Count:  Lab Results   Component Value Date/Time    WBC 2.95 (L) 02/09/2023 02:20 AM    HGB 9.9 (L) 02/09/2023 02:20 AM    HCT 31.6 (L) 02/09/2023 02:20 AM    HCT 46 05/06/2022 09:55 PM    RBC 4.17 02/09/2023 02:20 AM     02/09/2023 02:20 AM       Basic Chemistry Panel:  Lab Results   Component Value Date/Time     (L) 02/13/2023 06:51 AM    K 3.1 (L) 02/13/2023 06:51 AM    CL 93 (L) 02/13/2023 06:51 AM    CO2 30 (H) 02/13/2023 06:51 AM    BUN 6 (L) 02/13/2023 06:51 AM    CREATININE 0.5 02/13/2023 06:51 AM    CALCIUM 8.0 (L) 02/13/2023 06:51 AM       Hepatic Panel:  Lab Results   Component Value Date/Time    AST 36 02/06/2023 01:15 PM    ALT 23 02/06/2023 01:15 PM    ALKPHOS 116 02/06/2023 01:15 PM    BILITOT 1.4 (H) 02/06/2023 01:15 PM    ALBUMIN 1.7 (L) 02/06/2023 01:15 PM       Coagulation Panel:  Lab Results   Component Value Date/Time    INR 1.2 07/21/2022 04:28 AM       Imaging Studies:  XR Abd (2/8/23):  There is a right-sided ureteral stent identified.  No significant intra-abdominal calcifications noted.  No significant localized bowel dilatation.  Rounded soft tissue density in the left paraspinal area probably related to the previous surgery.    Assessment:  Odette Hart is a 67 y.o. year old female with history of HTN, CAD s/p PCI c/b HFrEF (EF 10%), MDD, debility, severe malnutrition, and open cholecystectomy and takedown of colovesical and colovaginal fistuli (two separate areas) with sigmoid colectomy, partial cecectomy and appendectomy,  drainage of intra-abdominal abscess and end colostomy 5/9/2022 recently admitted 12/22/22 - 12/30/22 for new colocutaneous fistula admitted 1/32/23 with incarcerated colonic prolapse from her fistula s/p manual reduction now s/p open transverse/descending colectomy with takedown of colocutaneous fistula and end colostomy + parastomal hernia repair + lysis of adhesions >60 minutes (Juliane) + debridement of skin/subcutaneos fat/fascia/muscle (Daniel) 2/7/23, postoperative course complicated by initial ileus since resolved.     Plan:  - Cont LRD as tolerated, HLIV  - Cont multimodal oral analgesia for postoperative pain to minimize narcotics   - Cont cipro for dirty UA with dysuria  - Appreciate medicine recs for management of multiple comorbidities, cont Lasix holiday  - ASA/Plavix, LVX/SCD, ambulation/IS PPX  - PT/OT for postoperative mobilization/ambulation  - WOCN c/s for fresh end colostomy care and repeat teaching  - Dispo: ongoing, GISSU    Patient discussed with attending, Dr. Cardozo.    Pablo Ramirez MD  Colon and Rectal Surgery Fellow

## 2023-02-13 NOTE — ASSESSMENT & PLAN NOTE
Intermittent difficulties with hypotension while inpatient. Unable to tolerate full HFrEF GDMT.   Stable.    - continue Losartan 12.5 mg daily and Metoprolol 12.5 mg daily  - Hold both if SBP<100

## 2023-02-13 NOTE — ASSESSMENT & PLAN NOTE
Chronic mild hyponatremia on chart review, now moderate on presentation  Found to be in ADHF on arrival  BNP 2600, volume overloaded, JVD present, bilateral LE edema on consultation  CXR with mild pulmonary congestion  Reports poor diet at home (crackers/peanutbutter)  Reduced albumin likely 2/2 to malnutrition    Stable and has improved. Asymptomatic     - strict I/Os  - 1000cc fluid restriction  - daily CMP  - replace electrolytes  - anticipate restarting lasix 2/14

## 2023-02-13 NOTE — PT/OT/SLP PROGRESS
Occupational Therapy   Treatment    Name: Odette Hart  MRN: [id number]  Admitting Diagnosis:  Colocutaneous fistula  7 Days Post-Op    Recommendations:     Discharge Recommendations: home health OT  Discharge Equipment Recommendations:  none  Barriers to discharge:  Decreased caregiver support    Assessment:     Odette Hart is a 67 y.o. female with a medical diagnosis of Colocutaneous fistula. Performance deficits affecting function are weakness, impaired endurance, impaired self care skills, impaired functional mobility, gait instability, impaired balance. Patient would benefit from continued skilled acute OT 3x/wk to improve functional mobility, increase independence with ADLs, and address established goals. Recommending HHOT once medically appropriate for discharge to increase maximal independence, reduce burden of care, and ensure safety.     Rehab Prognosis:  Good; patient would benefit from acute skilled OT services to address these deficits and reach maximum level of function.       Plan:     Patient to be seen 3 x/week to address the above listed problems via self-care/home management, therapeutic activities, therapeutic exercises  Plan of Care Expires: 03/07/23  Plan of Care Reviewed with: patient    Subjective     Pain/Comfort:  Pain Rating 1:  (patient did not rate)  Location - Side 1: Bilateral  Location - Orientation 1: generalized  Location 1: abdomen  Pain Addressed 1: Reposition, Distraction, Pre-medicate for activity, Cessation of Activity  Pain Rating Post-Intervention 1:  (patient did not rate)    Objective:     Communicated with: NSG prior to session.  Patient found HOB elevated with PureWick, colostomy, peripheral IV upon OT entry to room.    General Precautions: Standard, fall    Orthopedic Precautions:N/A  Braces: N/A  Respiratory Status: Room air     Occupational Performance:     Functional Mobility/Transfers:  Patient completed Sit <> Stand Transfer with contact guard assistance   with  rolling walker   Patient completed Toilet Transfer bed<>toilet with functional ambulation technique with CGA<>SBA with  rolling walker (slow ambulation and patient reports of pain with mild dizziness during transfer)     [facility] 6 Click ADL: 19    Treatment & Education:  Role of OT and POC  ADL retraining  Functional mobility training  Safety  Importance of OOB activity    Patient left up in chair with all lines intact, call button in reach, and all needs met (PureWik reattached)    GOALS:   Multidisciplinary Problems       Occupational Therapy Goals          Problem: Occupational Therapy    Goal Priority Disciplines Outcome Interventions   Occupational Therapy Goal     OT, PT/OT Ongoing, Progressing    Description: Goals to be met by: 2/28/2023    Patient will increase functional independence with ADLs by performing:    UE Dressing with Set-up Assistance.  LE Dressing with Setup.  Grooming while standing at sink with Modified [place].  Toileting from toilet with Modified [place] for hygiene and clothing management.   Supine to sit with Modified [place].  Stand pivot transfers with Modified [place].  Toilet transfer to toilet with Modified [place].                         Time Tracking:     OT Date of Treatment: 02/13/23  OT Start Time: 1343  OT Stop Time: 1358  OT Total Time (min): 15 min    Billable Minutes:Therapeutic Activity 15               2/13/2023

## 2023-02-13 NOTE — SUBJECTIVE & OBJECTIVE
Interval History:   NAEON. No chest pain or pressure. PCA recently discontinued. On lasix holiday, anticipate restarting PO lasix 40 mg qd tomorrow. Cr wnl. Na 131. K and Mag replaced today. On Cipro for UTI.      Review of Systems   Constitutional:  Negative for chills and fever.   HENT:  Negative for congestion, rhinorrhea and sore throat.    Respiratory:  Negative for cough, shortness of breath and wheezing.    Cardiovascular:  Negative for chest pain and palpitations.   Gastrointestinal:  Positive for abdominal pain (improving). Negative for nausea and vomiting.   Endocrine: Negative for polydipsia.   Genitourinary:  Negative for difficulty urinating, dysuria and hematuria.   Neurological:  Negative for dizziness, syncope, weakness, light-headedness and headaches.   Psychiatric/Behavioral:  Negative for agitation and confusion.    Objective:     Vital Signs (Most Recent):  Temp: 98 °F (36.7 °C) (02/13/23 1248)  Pulse: 75 (02/13/23 1248)  Resp: 16 (02/13/23 1248)  BP: (!) 92/55 (02/13/23 1248)  SpO2: (!) 93 % (02/13/23 1248)   Vital Signs (24h Range):  Temp:  [97.6 °F (36.4 °C)-98.6 °F (37 °C)] 98 °F (36.7 °C)  Pulse:  [75-98] 75  Resp:  [16-20] 16  SpO2:  [93 %-98 %] 93 %  BP: ()/(50-55) 92/55     Weight: 56.2 kg (124 lb)  Body mass index is 21.97 kg/m².    Intake/Output Summary (Last 24 hours) at 2/13/2023 1311  Last data filed at 2/13/2023 0600  Gross per 24 hour   Intake --   Output 920 ml   Net -920 ml        Physical Exam  Vitals and nursing note reviewed.   Constitutional:       General: She is not in acute distress.     Appearance: She is ill-appearing.   HENT:      Head: Normocephalic and atraumatic.      Right Ear: External ear normal.      Left Ear: External ear normal.      Nose: No congestion or rhinorrhea.   Eyes:      General:         Right eye: No discharge.         Left eye: No discharge.      Extraocular Movements: Extraocular movements intact.      Pupils: Pupils are equal, round, and  reactive to light.   Cardiovascular:      Rate and Rhythm: Normal rate and regular rhythm.      Heart sounds: Normal heart sounds. No murmur heard.  Pulmonary:      Effort: No respiratory distress.      Breath sounds: No wheezing or rales (bibasilar).   Abdominal:      General: There is no distension.      Comments: Colostomy, vertical/midline abdominal excision, bandage clean/dry   Musculoskeletal:         General: No tenderness.      Cervical back: Normal range of motion. No rigidity.      Right lower leg: No edema.      Left lower leg: No edema.   Skin:     Findings: Bruising present. No erythema.   Neurological:      General: No focal deficit present.      Mental Status: She is alert and oriented to person, place, and time.   Psychiatric:         Mood and Affect: Mood normal.         Behavior: Behavior normal.       Significant Labs: All pertinent labs within the past 24 hours have been reviewed.    Significant Imaging: I have reviewed all pertinent imaging results/findings within the past 24 hours.

## 2023-02-14 NOTE — ASSESSMENT & PLAN NOTE
Patient with new onset dysuria and urine changes. Previous history of UTI with resistance on culture including VRE, enterobacter, klebsiella and pseudomonas.     Plan  - change cipro to cefpodoxime for 3 more days.   - if hypotension worsens or patient febrile would recommend repeat blood cultures, lactic acid and would broaden to meropenem given previous resistance to cefepime and zosyn

## 2023-02-14 NOTE — PLAN OF CARE
Problem: Adult Inpatient Plan of Care  Goal: Plan of Care Review  Outcome: Ongoing, Progressing  Goal: Patient-Specific Goal (Individualized)  Outcome: Ongoing, Progressing  Goal: Absence of Hospital-Acquired Illness or Injury  Outcome: Ongoing, Progressing  Goal: Optimal Comfort and Wellbeing  Outcome: Ongoing, Progressing  Goal: Readiness for Transition of Care  Outcome: Ongoing, Progressing     Problem: Fall Injury Risk  Goal: Absence of Fall and Fall-Related Injury  Outcome: Ongoing, Progressing     Problem: Impaired Wound Healing  Goal: Optimal Wound Healing  Outcome: Ongoing, Progressing     Problem: Infection  Goal: Absence of Infection Signs and Symptoms  Outcome: Ongoing, Progressing     Problem: Skin Injury Risk Increased  Goal: Skin Health and Integrity  Outcome: Ongoing, Progressing    Shift summary 19-07:  Pain medication given per patient request with effective relief, appears to sleep well between assessments and vitals

## 2023-02-14 NOTE — PT/OT/SLP PROGRESS
Occupational Therapy      Patient Name:  Odette Hart   MRN:  [id number]    Patient not seen today secondary to patient had recently returned to bed as patient was OOB with PT prior to this therapists attempt and patient was fatigued. Will follow-up for therapy as scheduled.    2/14/2023

## 2023-02-14 NOTE — PLAN OF CARE
- A/Ox4, RA, VSS. Call light within reach, safety precautions in place. PRN pain medications appropriate for pain control.   - Cont abx for KEO  - Small amount of drainage out of abd incision.   -Pain control     Problem: Adult Inpatient Plan of Care  Goal: Plan of Care Review  Outcome: Ongoing, Progressing     Problem: Fall Injury Risk  Goal: Absence of Fall and Fall-Related Injury  Outcome: Ongoing, Progressing     Problem: Impaired Wound Healing  Goal: Optimal Wound Healing  Outcome: Ongoing, Progressing

## 2023-02-14 NOTE — ASSESSMENT & PLAN NOTE
Intermittent difficulties with hypotension while inpatient. Unable to tolerate full HFrEF GDMT.   Stable.    - continue Losartan 12.5 mg daily and Metoprolol 12.5 mg daily  - Hold losartan if SBP<95

## 2023-02-14 NOTE — PROGRESS NOTES
[facility]  Progress Note    Patient Name: Odette Hart  MRN: [id number]  Patient Class: IP- Inpatient   Admission Date: 1/31/2023  Length of Stay: 14 days  Attending Physician: Rafa Cardozo MD  Primary Care Provider: Braxton Barragan MD        Subjective:     Principal Problem:Colocutaneous fistula        HPI:  68 yo F with HTN, HLD (statin intolerance), DM, CAD s/p PCI to LAD (2012)/LCX (1/2022), HFrEF (EF10%), debility, severe malnutrition, and the following procedures performed 5/2022: cholecystectomy, takedown of colovesical and colovaginal fistula, sigmoid colectomy, partial cecectomy, end colostomy who presents for colocutaneous fistula repair with abdominal wall reconstruction. On admission she was found to be hyponatremic and in acute on chronic decompensated heart failure. Cardiology was consulted to manage her HF and have since signed off as her volume status has become more euvolemic following diuresis. Medicine was consulted for pr-eop evaluation and continued management of her hyponatremia. Per chart review patient is chronically, mildly hyponatremic at baseline. She takes Lasix, Toprol, spironolactone, and losartan for her HFrEF. She reports correct use of her home medications. She states her diet consists of peanut butter and crackers. Denies CP/SOB/nausea vomiting, cough fever chills, polydipsia. Her Na has been improving with diuresis. Patient has multiple risk factors that complicate surgery: acute on chronic decompensated HF, CAD with PCIx2, hypertension, hyperlipidemia, malnutrition. This being said she does not have reported/documented/observed dysfunction of liver, kidney, lung, clotting.    On examination, she is chronically ill appearing/in no acute distress/at mental baseline. AF, /58 (78), sat 98% on RA, no leukocytosis, hgb 11.8, Hct 38.5, Plts 237, moderate Hyponatremia Na 124 (initially 122 on admission) with labile Baseline, K 3.1, Cl 85, Bun 13,  Cr 00.8, Ca 7.9, albumin 2-3. BNP 2600, UOP 2300 on lasix 80mg    CXR with mild pulmonary congestion      Overview/Hospital Course:  Admitted for planned colocutaneous fistula repair, found to be hyponatremic and in ADHF. Started on IV diuresis, >6 L UOP, returned to euvolemia. Urine/serum studies consistent with hypervolemic hyponatremia most likely in 2/2 to acute on chronic HF. She does have multiple factors that maker her high risk for surgery, primarily her severrely reduced LVEF 10%. Cardiology and medicine consulted for pre-operative evaluation. Agree she is high risk, but need of her procedure outweigh the risks. 2/6 Patient tolerated colocutaneous takedown/reconstruction well. Her Hyponatremia has resolved with Lasix 80mg BID. S/p colocutaneous fistula revision with intra-op R ureteral stent placement for hydronephrosis. Developed post-op ileus. Given lasix holiday for over diuresis. Plavix, ASA restarted post-op. Anticipate discharge on prior home PO lasix dosing.     Complaint of dysuria and dark cloudy urine noted by primary team, patient started on cipro then transitioned to cefpodoxime given culture results of multiple organisms. Recently discontinued from PCA pump with PRN pain management of oxy 5 and 10 mg. Restarted lasix 20 mg on 2/14/23.       Interval History: Restarting lasix 20 mg. Daily weights. Incision pain after patient was placed in chair. Discussed with colorectal. Minimal drainage noted. PRN pain control increased. Cipro changed to cefpodoxime for UTI with 3 more days course.     Review of Systems   Constitutional:  Negative for chills and fever.   HENT:  Negative for congestion, rhinorrhea and sore throat.    Respiratory:  Negative for cough, shortness of breath and wheezing.    Cardiovascular:  Negative for chest pain and palpitations.   Gastrointestinal:  Positive for abdominal pain (improving). Negative for nausea and vomiting.   Endocrine: Negative for polydipsia.   Genitourinary:   Negative for difficulty urinating, dysuria and hematuria.   Neurological:  Negative for dizziness, syncope, weakness, light-headedness and headaches.   Psychiatric/Behavioral:  Negative for agitation and confusion.    Objective:     Vital Signs (Most Recent):  Temp: 98.4 °F (36.9 °C) (02/14/23 1253)  Pulse: 84 (02/14/23 1253)  Resp: 16 (02/14/23 1107)  BP: (!) 103/53 (02/14/23 1253)  SpO2: 97 % (02/14/23 1253)   Vital Signs (24h Range):  Temp:  [96.3 °F (35.7 °C)-98.4 °F (36.9 °C)] 98.4 °F (36.9 °C)  Pulse:  [73-95] 84  Resp:  [16-20] 16  SpO2:  [92 %-100 %] 97 %  BP: ()/(47-53) 103/53     Weight: 54.9 kg (121 lb)  Body mass index is 21.43 kg/m².    Intake/Output Summary (Last 24 hours) at 2/14/2023 1429  Last data filed at 2/14/2023 0540  Gross per 24 hour   Intake --   Output 975 ml   Net -975 ml      Physical Exam  Vitals and nursing note reviewed.   Constitutional:       General: She is not in acute distress.     Appearance: She is ill-appearing.   HENT:      Head: Normocephalic and atraumatic.      Right Ear: External ear normal.      Left Ear: External ear normal.      Nose: No congestion or rhinorrhea.   Eyes:      General:         Right eye: No discharge.         Left eye: No discharge.      Extraocular Movements: Extraocular movements intact.      Pupils: Pupils are equal, round, and reactive to light.   Cardiovascular:      Rate and Rhythm: Normal rate and regular rhythm.      Heart sounds: Normal heart sounds. No murmur heard.  Pulmonary:      Effort: No respiratory distress.      Breath sounds: No wheezing or rales (bibasilar).   Abdominal:      General: There is no distension.      Comments: Colostomy, vertical/midline abdominal excision, bandage clean/dry   Musculoskeletal:         General: No tenderness.      Cervical back: Normal range of motion. No rigidity.      Right lower leg: No edema.      Left lower leg: No edema.   Skin:     Findings: Bruising present. No erythema.   Neurological:       General: No focal deficit present.      Mental Status: She is alert and oriented to person, place, and time.   Psychiatric:         Mood and Affect: Mood normal.         Behavior: Behavior normal.       Significant Labs: All pertinent labs within the past 24 hours have been reviewed.  CBC: No results for input(s): WBC, HGB, HCT, PLT in the last 48 hours.  CMP:   Recent Labs   Lab 02/13/23  0651 02/14/23  0215   * 132*   K 3.1* 4.3   CL 93* 92*   CO2 30* 32*   GLU 95 84   BUN 6* 9   CREATININE 0.5 0.6   CALCIUM 8.0* 8.4*   ANIONGAP 8 8     Urine Culture:   Recent Labs   Lab 02/12/23  1657   LABURIN Multiple organisms isolated. None in predominance.  Repeat if  clinically necessary.     Urine Studies:   Recent Labs   Lab 02/12/23  1657   COLORU Tammy   APPEARANCEUA Cloudy*   PHUR 5.0   SPECGRAV 1.025   PROTEINUA 1+*   GLUCUA Negative   KETONESU Negative   BILIRUBINUA Negative   OCCULTUA 3+*   NITRITE Negative   LEUKOCYTESUR Trace*   RBCUA >100*   WBCUA 62*   BACTERIA Few*   SQUAMEPITHEL 4   HYALINECASTS 0       Significant Imaging: I have reviewed all pertinent imaging results/findings within the past 24 hours.      Assessment/Plan:      * Colocutaneous fistula  S/p colocutaneous fistula revision with intra-op R ureteral stent placement for hydronephrosis. Developed post-op ileus.   Required post-op PCA pump now discontinued and on PRN opioids    - increase oxy frequency to Q4 PRN and continue bowel regimen    Acute cystitis with hematuria  Patient with new onset dysuria and urine changes. Previous history of UTI with resistance on culture including VRE, enterobacter, klebsiella and pseudomonas.     Plan  - change cipro to cefpodoxime for 3 more days.   - if hypotension worsens or patient febrile would recommend repeat blood cultures, lactic acid and would broaden to meropenem given previous resistance to cefepime and zosyn       Ileus  Patient has Post-operative ileus which is adynamic in etiology which is  improving. This is inherent to her procedure/medications. Will treat conservatively with bowel rest, IV fluids, serial abdominal exams and avoidance of GI paralytics such as narcotics or anti-spasmodics. Monitor patient closely.     Diet advanced 2/12    -continue management per primary CRS    Hyponatremia  Chronic mild hyponatremia on chart review, now moderate on presentation  Found to be in ADHF on arrival  BNP 2600, volume overloaded, JVD present, bilateral LE edema on consultation  CXR with mild pulmonary congestion  Reports poor diet at home (crackers/peanutbutter)  Reduced albumin likely 2/2 to malnutrition    Stable and has improved. Asymptomatic     - strict I/Os  - 1000cc fluid restriction  - daily CMP  - replace electrolytes      Hypertension  Intermittent difficulties with hypotension while inpatient. Unable to tolerate full HFrEF GDMT.   Stable.    - continue Losartan 12.5 mg daily and Metoprolol 12.5 mg daily  - Hold losartan if SBP<95    Hypercholesterolemia  Lipids well controlled  Has documented statin intolerance    - continue ezetimibe    Coronary artery disease  History of Cad with PCIx2 LAD in 2012 and LCX 1/2022  Home regimen: DAPT    No further surgical intervention planned inpatient by CRS or urology.     - continue aspirin  - continue clopidogrel    Ischemic cardiomyopathy  Cardiology consulted for management of ADHF, now signed off  Recent echo showed EF of 10%  Patient hypervolemic on presentation, appears more euvolemic today  Home GDMT: Toprol-XL 25, losartan 25 mg.  Initial CXR without new detrimental changes since prior, BNP 2600 on arrival    Recommendations:   - Restart lasix 40 mg daily PO. 1x dose of IV 40 mg on 2/14  - anticipate discharge on pta PO lasix regimen  - replete Mag>2, K>4  - continue Toprol-XL 12.5 per cardiology recommendations  - losartan 12.5 mg qd given hypotension with hold parameters  - continue losartan 12.5mg daily per cardiology req  - will include hold  parameters for hypotension  - would avoid IV anti-hypertensive  - holding home spironolactone, unable to tolerate full HFrEF GDMT      VTE Risk Mitigation (From admission, onward)         Ordered     enoxaparin injection 40 mg  Daily         02/07/23 1659     Place sequential compression device  Until discontinued         02/06/23 1447     IP VTE HIGH RISK PATIENT  Once         02/06/23 1447                Discharge Planning   SIVAN: 2/17/2023     Code Status: Full Code   Is the patient medically ready for discharge?:     Reason for patient still in hospital (select all that apply): Patient trending condition  Discharge Plan A: Home Health                  Ml Lagos DO  Department of Hospital Medicine   [facility]

## 2023-02-14 NOTE — PROGRESS NOTES
Patient Name: Odette Hart  Date: 2/14/2023  Service: Colon and Rectal Surgery    Subjective:  NAEO. Tolerating small vol low fiber diet without nausea/vomiting or belching/hiccups/heartburn. Passing soft/solid stool into bag with flatus, denies further episodes of rectal stool. Voiding freely with persistent dysuria. Denies fevers/chills/sweats, lightheadedness/dizziness, chest pain/shortness of breath, upper or lower extremity edema/pain.     Medications:    Current Facility-Administered Medications:     [COMPLETED] acetaminophen 1,000 mg/100 mL (10 mg/mL) injection 1,000 mg, 1,000 mg, Intravenous, Q8H, Stopped at 02/07/23 0527 **FOLLOWED BY** acetaminophen tablet 1,000 mg, 1,000 mg, Oral, Q8H, Pablo Ramirez MD, 1,000 mg at 02/14/23 0527    albuterol inhaler 2 puff, 2 puff, Inhalation, Q6H PRN, Pablo Ramirez MD    ALPRAZolam tablet 0.5 mg, 0.5 mg, Oral, Nightly PRN, Rafa Cardozo MD, 0.5 mg at 02/13/23 2101    aspirin EC tablet 81 mg, 81 mg, Oral, Daily, Pablo Ramirez MD, 81 mg at 02/14/23 0957    balsam peru-castor oiL Oint, , Topical (Top), BID, Pablo Ramirez MD, Given at 02/14/23 0957    cefpodoxime tablet 100 mg, 100 mg, Oral, Q12H, Keanu Lagos,     clopidogreL tablet 75 mg, 75 mg, Oral, Daily, Pablo Ramirez MD, 75 mg at 02/14/23 0957    dextrose 10% bolus 125 mL 125 mL, 12.5 g, Intravenous, PRN, Pablo Ramirez MD    dextrose 10% bolus 250 mL 250 mL, 25 g, Intravenous, PRN, Pablo Ramirez MD    enoxaparin injection 40 mg, 40 mg, Subcutaneous, Daily, Pablo Ramirez MD, 40 mg at 02/13/23 1638    ezetimibe tablet 10 mg, 10 mg, Oral, Daily, Pablo Ramirez MD, 10 mg at 02/14/23 0957    famotidine tablet 20 mg, 20 mg, Oral, Daily, Pablo Ramirez MD, 20 mg at 02/14/23 0957    fluticasone propionate 50 mcg/actuation nasal spray 50 mcg, 1 spray, Each Nostril, Daily PRN, Pablo Ramirez MD    furosemide tablet 20 mg, 20 mg, Oral, Daily, Kirstin Lima DO    losartan  split tablet 12.5 mg, 12.5 mg, Oral, Daily, Pablo Ramirez MD, 12.5 mg at 02/14/23 0957    metoprolol succinate (TOPROL-XL) 24 hr split tablet 12.5 mg, 12.5 mg, Oral, Daily, Pablo Ramirez MD, 12.5 mg at 02/14/23 0957    naloxone 0.4 mg/mL injection 0.02 mg, 0.02 mg, Intravenous, PRN, Pablo Ramirez MD    nitroGLYCERIN SL tablet 0.4 mg, 0.4 mg, Sublingual, Q5 Min PRN, Pablo Ramirez MD    nystatin 100,000 unit/mL suspension 500,000 Units, 500,000 Units, Oral, QID, Fahad Smith MD, 500,000 Units at 02/14/23 0957    ondansetron injection 4 mg, 4 mg, Intravenous, Q6H PRN, Pablo Ramirez MD, 4 mg at 02/10/23 1742    oxyCODONE immediate release tablet 5 mg, 5 mg, Oral, Q4H PRN, Keanu Lagos DO    oxyCODONE immediate release tablet Tab 10 mg, 10 mg, Oral, Q4H PRN, Keanu Lagos DO, 10 mg at 02/14/23 1107    polyethylene glycol packet 17 g, 17 g, Oral, Daily, Rafa Cardozo MD, 17 g at 02/13/23 0836    prochlorperazine injection Soln 5 mg, 5 mg, Intravenous, Q6H PRN, Pablo Ramirez MD, 5 mg at 02/05/23 1444    sodium chloride 0.9% flush 10 mL, 10 mL, Intravenous, PRN, Pablo Ramirez MD    sodium chloride 0.9% flush 10 mL, 10 mL, Intra-Catheter, PRN, Pablo Ramirez MD    Vital Signs:  Vitals:    02/14/23 1253   BP: (!) 103/53   Pulse: 84   Resp:    Temp: 98.4 °F (36.9 °C)       In/Out:  Intake/Output - Last 3 Shifts         02/12 0700 02/13 0659 02/13 0700 02/14 0659 02/14 0700  02/15 0659    Urine (mL/kg/hr) 920 (0.7) 175 (0.1)     Drains  700     Stool 250 550     Total Output 1170 1425     Net -1170 -1425            Urine Occurrence 4 x      Stool Occurrence 0 x 1 x 0 x            Physical Exam:  General: Alert, oriented, in no apparent distress  HEENT: Sclera anicteric, trachea midline  Lungs: Normal respiratory rate and effort on room air  Abdomen: Soft, minimal cat-incisional TTP, nondistended. Cat-incisional blanching nontender erythema along inferior aspect of wound; wound  sterilely probed with cotton tipped applicator with ~50cc serous drainage mixed with small volume liquified hematoma without purulence. LUQ end colostomy pink and well perfused with soft stool and flatus in bag.  Extremities: Warm, well perfused, no edema  Neuro: Grossly intact, moves all extremities  Psych: Appropriate affect    Laboratory Studies:  Complete Blood Count:  Lab Results   Component Value Date/Time    WBC 2.95 (L) 02/09/2023 02:20 AM    HGB 9.9 (L) 02/09/2023 02:20 AM    HCT 31.6 (L) 02/09/2023 02:20 AM    HCT 46 05/06/2022 09:55 PM    RBC 4.17 02/09/2023 02:20 AM     02/09/2023 02:20 AM       Basic Chemistry Panel:  Lab Results   Component Value Date/Time     (L) 02/14/2023 02:15 AM    K 4.3 02/14/2023 02:15 AM    CL 92 (L) 02/14/2023 02:15 AM    CO2 32 (H) 02/14/2023 02:15 AM    BUN 9 02/14/2023 02:15 AM    CREATININE 0.6 02/14/2023 02:15 AM    CALCIUM 8.4 (L) 02/14/2023 02:15 AM       Hepatic Panel:  Lab Results   Component Value Date/Time    AST 36 02/06/2023 01:15 PM    ALT 23 02/06/2023 01:15 PM    ALKPHOS 116 02/06/2023 01:15 PM    BILITOT 1.4 (H) 02/06/2023 01:15 PM    ALBUMIN 1.7 (L) 02/06/2023 01:15 PM       Coagulation Panel:  Lab Results   Component Value Date/Time    INR 1.2 07/21/2022 04:28 AM       Imaging Studies:  XR Abd (2/8/23):  There is a right-sided ureteral stent identified.  No significant intra-abdominal calcifications noted.  No significant localized bowel dilatation.  Rounded soft tissue density in the left paraspinal area probably related to the previous surgery.    Assessment:  Odette Hart is a 67 y.o. year old female with history of HTN, CAD s/p PCI c/b HFrEF (EF 10%), MDD, debility, severe malnutrition, and open cholecystectomy and takedown of colovesical and colovaginal fistuli (two separate areas) with sigmoid colectomy, partial cecectomy and appendectomy, drainage of intra-abdominal abscess and end colostomy 5/9/2022 recently admitted 12/22/22 -  12/30/22 for new colocutaneous fistula admitted 1/32/23 with incarcerated colonic prolapse from her fistula s/p manual reduction now s/p open transverse/descending colectomy with takedown of colocutaneous fistula and end colostomy + parastomal hernia repair + lysis of adhesions >60 minutes (Juliane) + debridement of skin/subcutaneos fat/fascia/muscle (Daniel) 2/7/23, postoperative course complicated by initial ileus since resolved.     Plan:  - Cont LRD as tolerated, HLIV  - Cont multimodal oral analgesia for postoperative pain to minimize narcotics   - Cont cipro for dirty UA with dysuria  - Appreciate medicine recs for management of multiple comorbidities  - ASA/Plavix, LVX/SCD, ambulation/IS PPX  - PT/OT for postoperative mobilization/ambulation  - WOCN c/s for fresh end colostomy care and repeat teaching  - Dispo: ongoing, [facility]    Patient discussed with attending, Dr. Cardozo.    Pablo Ramirez MD  Colon and Rectal Surgery Fellow

## 2023-02-14 NOTE — PT/OT/SLP PROGRESS
"Physical Therapy Treatment    Patient Name:  Odette Hart   MRN:  [id number]    Recommendations:     Discharge Recommendations: home health PT  Discharge Equipment Recommendations: none  Barriers to discharge: None    Assessment:     Odette Hart is a 67 y.o. female admitted with a medical diagnosis of Colocutaneous fistula.  She presents with the following impairments/functional limitations: impaired endurance, weakness, impaired functional mobility, gait instability, impaired balance, impaired self care skills. Pt was receptive and tolerated physical therapy treatment fairly well. Pt able to amb 120 feet with RW and CGA with adequate standing rest breaks. Pt would continue to progress with continued acute skilled physical therapy services to improve functional mobility.    Rehab Prognosis: Good; patient would benefit from acute skilled PT services to address these deficits and reach maximum level of function.    Recent Surgery: Procedure(s) (LRB):  REVISION, COLOSTOMY (N/A)  INSERTION, CATHETER, URETER, BILATERAL (Left)  CYSTOSCOPY, WITH URETERAL STENT INSERTION (Right)  DEBRIDEMENT, ABDOMEN (N/A)  COLECTOMY, PARTIAL  LYSIS, ADHESIONS (N/A) 8 Days Post-Op    Plan:     During this hospitalization, patient to be seen 3 x/week to address the identified rehab impairments via gait training, therapeutic activities, therapeutic exercises and progress toward the following goals:    Plan of Care Expires:  03/09/23    Subjective     Chief Complaint: Abdominal pain  Patient/Family Comments/goals: "Scared i'm gonna tear my incision again"  Pain/Comfort:  Pain Rating 1: 10/10 (pt did not appear to be in acute distress)  Location - Orientation 1: generalized  Location 1: abdomen  Pain Addressed 1: Reposition, Distraction  Pain Rating Post-Intervention 1: other (see comments) (pt did not rate)      Objective:     Communicated with nurse prior to session.  Patient found up in chair with colostomy, PureWick upon PT entry " to room.     General Precautions: Standard, fall  Orthopedic Precautions: N/A  Braces: N/A  Respiratory Status: Room air     Functional Mobility:  Bed Mobility:     Scooting: minimum assistance  Sit to Supine: minimum assistance  Transfers:     Sit to Stand:  contact guard assistance with rolling walker  Pt needed verbal cues to not valsalva during transfer  Gait: total of 120' with RW and CGA.   Pt took 4 standing rest breaks during gait trial.   Pt amb with decreased jna and decreased step length  Balance: static/dynamic standing balance: fair, pt amb with RW and CGA      AM-PAC 6 CLICK MOBILITY  Turning over in bed (including adjusting bedclothes, sheets and blankets)?: 3  Sitting down on and standing up from a chair with arms (e.g., wheelchair, bedside commode, etc.): 3  Moving from lying on back to sitting on the side of the bed?: 3  Moving to and from a bed to a chair (including a wheelchair)?: 3  Need to walk in hospital room?: 3  Climbing 3-5 steps with a railing?: 2  Basic Mobility Total Score: 17       Treatment & Education:  Discussed continued POC with pt who verbalized understanding. Pt educated on importance of continued mobility during hospital stay. Pt educated on safety with mobility.    Patient left HOB elevated with all lines intact and call button in reach..    GOALS:   Multidisciplinary Problems       Physical Therapy Goals          Problem: Physical Therapy    Goal Priority Disciplines Outcome Goal Variances Interventions   Physical Therapy Goal     PT, PT/OT Ongoing, Progressing     Description: Goals to be met by: 2/15/2023     Patient will increase functional independence with mobility by performin. Gait  x 250 feet with Supervision using LRAD.   2. Ascend/descend 4 stair with right Handrails Supervision using no Assistive device.   3. Sit to Stand with Supervision using LRAD                       Time Tracking:     PT Received On: 23  PT Start Time: 1021     PT Stop Time:  1045  PT Total Time (min): 24 min     Billable Minutes: Gait Training 14 and Therapeutic Activity 10    Treatment Type: Treatment  PT/PTA: PT     PTA Visit Number: 2 02/14/2023

## 2023-02-14 NOTE — SUBJECTIVE & OBJECTIVE
Interval History: Restarting lasix 20 mg. Daily weights. Incision pain after patient was placed in chair. Discussed with colorectal. Minimal drainage noted. PRN pain control increased. Cipro changed to cefpodoxime for UTI with 3 more days course.     Review of Systems   Constitutional:  Negative for chills and fever.   HENT:  Negative for congestion, rhinorrhea and sore throat.    Respiratory:  Negative for cough, shortness of breath and wheezing.    Cardiovascular:  Negative for chest pain and palpitations.   Gastrointestinal:  Positive for abdominal pain (improving). Negative for nausea and vomiting.   Endocrine: Negative for polydipsia.   Genitourinary:  Negative for difficulty urinating, dysuria and hematuria.   Neurological:  Negative for dizziness, syncope, weakness, light-headedness and headaches.   Psychiatric/Behavioral:  Negative for agitation and confusion.    Objective:     Vital Signs (Most Recent):  Temp: 98.4 °F (36.9 °C) (02/14/23 1253)  Pulse: 84 (02/14/23 1253)  Resp: 16 (02/14/23 1107)  BP: (!) 103/53 (02/14/23 1253)  SpO2: 97 % (02/14/23 1253)   Vital Signs (24h Range):  Temp:  [96.3 °F (35.7 °C)-98.4 °F (36.9 °C)] 98.4 °F (36.9 °C)  Pulse:  [73-95] 84  Resp:  [16-20] 16  SpO2:  [92 %-100 %] 97 %  BP: ()/(47-53) 103/53     Weight: 54.9 kg (121 lb)  Body mass index is 21.43 kg/m².    Intake/Output Summary (Last 24 hours) at 2/14/2023 1421  Last data filed at 2/14/2023 0540  Gross per 24 hour   Intake --   Output 975 ml   Net -975 ml      Physical Exam  Vitals and nursing note reviewed.   Constitutional:       General: She is not in acute distress.     Appearance: She is ill-appearing.   HENT:      Head: Normocephalic and atraumatic.      Right Ear: External ear normal.      Left Ear: External ear normal.      Nose: No congestion or rhinorrhea.   Eyes:      General:         Right eye: No discharge.         Left eye: No discharge.      Extraocular Movements: Extraocular movements intact.       Pupils: Pupils are equal, round, and reactive to light.   Cardiovascular:      Rate and Rhythm: Normal rate and regular rhythm.      Heart sounds: Normal heart sounds. No murmur heard.  Pulmonary:      Effort: No respiratory distress.      Breath sounds: No wheezing or rales (bibasilar).   Abdominal:      General: There is no distension.      Comments: Colostomy, vertical/midline abdominal excision, bandage clean/dry   Musculoskeletal:         General: No tenderness.      Cervical back: Normal range of motion. No rigidity.      Right lower leg: No edema.      Left lower leg: No edema.   Skin:     Findings: Bruising present. No erythema.   Neurological:      General: No focal deficit present.      Mental Status: She is alert and oriented to person, place, and time.   Psychiatric:         Mood and Affect: Mood normal.         Behavior: Behavior normal.       Significant Labs: All pertinent labs within the past 24 hours have been reviewed.  CBC: No results for input(s): WBC, HGB, HCT, PLT in the last 48 hours.  CMP:   Recent Labs   Lab 02/13/23  0651 02/14/23  0215   * 132*   K 3.1* 4.3   CL 93* 92*   CO2 30* 32*   GLU 95 84   BUN 6* 9   CREATININE 0.5 0.6   CALCIUM 8.0* 8.4*   ANIONGAP 8 8     Urine Culture:   Recent Labs   Lab 02/12/23  1657   LABURIN Multiple organisms isolated. None in predominance.  Repeat if  clinically necessary.     Urine Studies:   Recent Labs   Lab 02/12/23  1657   COLORU Tammy   APPEARANCEUA Cloudy*   PHUR 5.0   SPECGRAV 1.025   PROTEINUA 1+*   GLUCUA Negative   KETONESU Negative   BILIRUBINUA Negative   OCCULTUA 3+*   NITRITE Negative   LEUKOCYTESUR Trace*   RBCUA >100*   WBCUA 62*   BACTERIA Few*   SQUAMEPITHEL 4   HYALINECASTS 0       Significant Imaging: I have reviewed all pertinent imaging results/findings within the past 24 hours.

## 2023-02-14 NOTE — ASSESSMENT & PLAN NOTE
Cardiology consulted for management of ADHF, now signed off  Recent echo showed EF of 10%  Patient hypervolemic on presentation, appears more euvolemic today  Home GDMT: Toprol-XL 25, losartan 25 mg.  Initial CXR without new detrimental changes since prior, BNP 2600 on arrival    Recommendations:   - Restart lasix 40 mg daily PO. 1x dose of IV 40 mg on 2/14  - anticipate discharge on pta PO lasix regimen  - replete Mag>2, K>4  - continue Toprol-XL 12.5 per cardiology recommendations  - losartan 12.5 mg qd given hypotension with hold parameters  - continue losartan 12.5mg daily per cardiology req  - will include hold parameters for hypotension  - would avoid IV anti-hypertensive  - holding home spironolactone, unable to tolerate full HFrEF GDMT

## 2023-02-14 NOTE — ASSESSMENT & PLAN NOTE
S/p colocutaneous fistula revision with intra-op R ureteral stent placement for hydronephrosis. Developed post-op ileus.   Required post-op PCA pump now discontinued and on PRN opioids    - increase oxy frequency to Q4 PRN and continue bowel regimen

## 2023-02-14 NOTE — PLAN OF CARE
- A/Ox4, RA, VSS. Pain well controlled with PRN medications.   - Up to the chair today.    - Abd soft/nontender. Ostomy with good output.   Problem: Adult Inpatient Plan of Care  Goal: Plan of Care Review  Outcome: Ongoing, Progressing     Problem: Fall Injury Risk  Goal: Absence of Fall and Fall-Related Injury  Outcome: Ongoing, Progressing     Problem: Impaired Wound Healing  Goal: Optimal Wound Healing  Outcome: Ongoing, Progressing

## 2023-02-14 NOTE — ASSESSMENT & PLAN NOTE
Chronic mild hyponatremia on chart review, now moderate on presentation  Found to be in ADHF on arrival  BNP 2600, volume overloaded, JVD present, bilateral LE edema on consultation  CXR with mild pulmonary congestion  Reports poor diet at home (crackers/peanutbutter)  Reduced albumin likely 2/2 to malnutrition    Stable and has improved. Asymptomatic     - strict I/Os  - 1000cc fluid restriction  - daily CMP  - replace electrolytes

## 2023-02-15 NOTE — ASSESSMENT & PLAN NOTE
Chronic mild hyponatremia on chart review, now moderate on presentation  Found to be in ADHF on arrival  BNP 2600, volume overloaded, JVD present, bilateral LE edema on consultation  CXR with mild pulmonary congestion  Reports poor diet at home (crackers/peanutbutter)  Reduced albumin likely 2/2 to malnutrition    Stable and has improved. Asymptomatic     - strict I/Os  - daily CMP  - replace electrolytes

## 2023-02-15 NOTE — PROGRESS NOTES
Patient Name: Odette Hart  Date: 2/15/2023  Service: Colon and Rectal Surgery    Subjective:  NAEO. Tolerating small vol low fiber diet without nausea/vomiting or belching/hiccups/heartburn. Passing soft/solid stool into bag with flatus, denies further episodes of rectal stool. Voiding freely with persistent dysuria. Denies fevers/chills/sweats, lightheadedness/dizziness, chest pain/shortness of breath, upper or lower extremity edema/pain.     Medications:    Current Facility-Administered Medications:     [COMPLETED] acetaminophen 1,000 mg/100 mL (10 mg/mL) injection 1,000 mg, 1,000 mg, Intravenous, Q8H, Stopped at 02/07/23 0527 **FOLLOWED BY** acetaminophen tablet 1,000 mg, 1,000 mg, Oral, Q8H, Pablo Ramirez MD, 1,000 mg at 02/14/23 1416    albuterol inhaler 2 puff, 2 puff, Inhalation, Q6H PRN, Pablo Ramirez MD    ALPRAZolam tablet 0.5 mg, 0.5 mg, Oral, Nightly PRN, Rafa Cardozo MD, 0.5 mg at 02/14/23 2204    aspirin EC tablet 81 mg, 81 mg, Oral, Daily, Pablo Ramirez MD, 81 mg at 02/15/23 0849    balsam peru-castor oiL Oint, , Topical (Top), BID, Pablo Ramirez MD, Given at 02/15/23 0850    cefpodoxime tablet 100 mg, 100 mg, Oral, Q12H, Keanu Lagos DO, 100 mg at 02/15/23 0849    clopidogreL tablet 75 mg, 75 mg, Oral, Daily, Pablo Ramirez MD, 75 mg at 02/15/23 0847    dextrose 10% bolus 125 mL 125 mL, 12.5 g, Intravenous, PRN, Pablo Ramirez MD    dextrose 10% bolus 250 mL 250 mL, 25 g, Intravenous, PRN, Pablo Ramirez MD    enoxaparin injection 40 mg, 40 mg, Subcutaneous, Daily, Pablo Ramirez MD, 40 mg at 02/14/23 1712    ezetimibe tablet 10 mg, 10 mg, Oral, Daily, Pablo Ramirez MD, 10 mg at 02/15/23 0849    famotidine tablet 20 mg, 20 mg, Oral, Daily, Pablo Ramirez MD, 20 mg at 02/15/23 0847    fluticasone propionate 50 mcg/actuation nasal spray 50 mcg, 1 spray, Each Nostril, Daily PRN, Pablo Ramirez MD    furosemide tablet 40 mg, 40 mg, Oral, Daily,  Keanu Lagos DO, 40 mg at 02/15/23 0847    losartan split tablet 12.5 mg, 12.5 mg, Oral, Daily, Keanu Lagos DO, 12.5 mg at 02/15/23 0849    metoprolol succinate (TOPROL-XL) 24 hr split tablet 12.5 mg, 12.5 mg, Oral, Daily, Pablo Ramirez MD, 12.5 mg at 02/15/23 0847    naloxone 0.4 mg/mL injection 0.02 mg, 0.02 mg, Intravenous, PRN, Pablo Ramirez MD    nitroGLYCERIN SL tablet 0.4 mg, 0.4 mg, Sublingual, Q5 Min PRN, Pablo Ramirez MD    nystatin 100,000 unit/mL suspension 500,000 Units, 500,000 Units, Oral, QID, Fahad Smith MD, 500,000 Units at 02/15/23 0847    ondansetron injection 4 mg, 4 mg, Intravenous, Q6H PRN, Pablo Ramirez MD, 4 mg at 02/10/23 1742    oxyCODONE immediate release tablet 5 mg, 5 mg, Oral, Q4H PRN, Keanu Lagos DO    oxyCODONE immediate release tablet Tab 10 mg, 10 mg, Oral, Q4H PRN, Keanu Lagos DO, 10 mg at 02/15/23 0848    polyethylene glycol packet 17 g, 17 g, Oral, Daily, Rafa Cardozo MD, 17 g at 02/15/23 0847    prochlorperazine injection Soln 5 mg, 5 mg, Intravenous, Q6H PRN, Pablo Ramirez MD, 5 mg at 02/05/23 1444    sodium chloride 0.9% flush 10 mL, 10 mL, Intravenous, PRN, Pablo Ramirez MD    sodium chloride 0.9% flush 10 mL, 10 mL, Intra-Catheter, PRN, Pablo Ramirez MD    Vital Signs:  Vitals:    02/15/23 0847   BP:    Pulse:    Resp: 16   Temp:        In/Out:  Intake/Output - Last 3 Shifts         02/13 0700 02/14 0659 02/14 0700  02/15 0659 02/15 0700 02/16 0659    Urine (mL/kg/hr) 175 (0.1) 1325 (1)     Drains 700      Stool 550      Total Output 1425 1325     Net -1425 -1325            Stool Occurrence 1 x 0 x             Physical Exam:  General: Alert, oriented, in no apparent distress  HEENT: Sclera anicteric, trachea midline  Lungs: Normal respiratory rate and effort on room air  Abdomen: Soft, minimal renata-incisional TTP, nondistended. Stable/slightly improved inferior renata-incisional blanching erythema without tenderness,  persistent serous>serosanguinous drainage from incision following sterile probing yesterday. LUQ end colostomy pink and well perfused with soft stool and flatus in bag.  Extremities: Warm, well perfused, no edema  Neuro: Grossly intact, moves all extremities  Psych: Appropriate affect    Laboratory Studies:  Complete Blood Count:  Lab Results   Component Value Date/Time    WBC 5.99 02/15/2023 02:21 AM    HGB 10.7 (L) 02/15/2023 02:21 AM    HCT 36.1 (L) 02/15/2023 02:21 AM    HCT 46 05/06/2022 09:55 PM    RBC 4.60 02/15/2023 02:21 AM     02/15/2023 02:21 AM       Basic Chemistry Panel:  Lab Results   Component Value Date/Time     (L) 02/15/2023 02:21 AM    K 3.5 02/15/2023 02:21 AM    CL 90 (L) 02/15/2023 02:21 AM    CO2 34 (H) 02/15/2023 02:21 AM    BUN 7 (L) 02/15/2023 02:21 AM    CREATININE 0.6 02/15/2023 02:21 AM    CALCIUM 8.4 (L) 02/15/2023 02:21 AM       Hepatic Panel:  Lab Results   Component Value Date/Time    AST 36 02/06/2023 01:15 PM    ALT 23 02/06/2023 01:15 PM    ALKPHOS 116 02/06/2023 01:15 PM    BILITOT 1.4 (H) 02/06/2023 01:15 PM    ALBUMIN 1.7 (L) 02/06/2023 01:15 PM       Coagulation Panel:  Lab Results   Component Value Date/Time    INR 1.2 07/21/2022 04:28 AM       Imaging Studies:  XR Abd (2/8/23):  There is a right-sided ureteral stent identified.  No significant intra-abdominal calcifications noted.  No significant localized bowel dilatation.  Rounded soft tissue density in the left paraspinal area probably related to the previous surgery.    Assessment:  Odette Hart is a 67 y.o. year old female with history of HTN, CAD s/p PCI c/b HFrEF (EF 10%), MDD, debility, severe malnutrition, and open cholecystectomy and takedown of colovesical and colovaginal fistuli (two separate areas) with sigmoid colectomy, partial cecectomy and appendectomy, drainage of intra-abdominal abscess and end colostomy 5/9/2022 recently admitted 12/22/22 - 12/30/22 for new colocutaneous fistula  admitted 1/32/23 with incarcerated colonic prolapse from her fistula s/p manual reduction now s/p open transverse/descending colectomy with takedown of colocutaneous fistula and end colostomy + parastomal hernia repair + lysis of adhesions >60 minutes (Juliane) + debridement of skin/subcutaneos fat/fascia/muscle (Daniel) 2/7/23, postoperative course complicated by initial ileus since resolved.     Plan:  - Cont LRD as tolerated, HLIV  - Cont multimodal oral analgesia for postoperative pain to minimize narcotics   - Cont cefpodoxime for dirty UA with dysuria, Ucx with multiple isolated but no dominant organisms  - Appreciate medicine recs for management of multiple comorbidities, PO Lasix resumed 2/14/23  - ASA/Plavix, LVX/SCD, ambulation/IS PPX  - PT/OT for postoperative mobilization/ambulation  - WOCN c/s for fresh end colostomy care and repeat teaching  - Social work assisting in arranging home health upon discharge  - Dispo: ongoing, [facility]    Patient evaluated with attending, Dr. Cardozo.    Pablo Ramirez MD  Colon and Rectal Surgery Fellow

## 2023-02-15 NOTE — PLAN OF CARE
Problem: Adult Inpatient Plan of Care  Goal: Optimal Comfort and Wellbeing  Outcome: Ongoing, Progressing     Problem: Fall Injury Risk  Goal: Absence of Fall and Fall-Related Injury  Outcome: Ongoing, Progressing     Problem: Infection  Goal: Absence of Infection Signs and Symptoms  Outcome: Ongoing, Progressing     Problem: Skin Injury Risk Increased  Goal: Skin Health and Integrity  Outcome: Ongoing, Progressing     Problem: Adult Inpatient Plan of Care  Goal: Optimal Comfort and Wellbeing  Outcome: Ongoing, Progressing      No

## 2023-02-15 NOTE — PLAN OF CARE
Recommendations    1) Continue regular diet   -Monitor tolerance  - Honor food preferences   2) Continue Boost Plus TID  3) RD following    Goals: meets % een/epn by next rd f/u  Nutrition Goal Status: progressing towards goal  Communication of RD Recs: other (comment) (POC)

## 2023-02-15 NOTE — PROGRESS NOTES
[facility]  Progress Note    Patient Name: Odette Hart  MRN: [id number]  Patient Class: IP- Inpatient   Admission Date: 1/31/2023  Length of Stay: 15 days  Attending Physician: Rafa Cardozo MD  Primary Care Provider: Braxton Barragan MD        Subjective:     Principal Problem:Colocutaneous fistula        HPI:  66 yo F with HTN, HLD (statin intolerance), DM, CAD s/p PCI to LAD (2012)/LCX (1/2022), HFrEF (EF10%), debility, severe malnutrition, and the following procedures performed 5/2022: cholecystectomy, takedown of colovesical and colovaginal fistula, sigmoid colectomy, partial cecectomy, end colostomy who presents for colocutaneous fistula repair with abdominal wall reconstruction. On admission she was found to be hyponatremic and in acute on chronic decompensated heart failure. Cardiology was consulted to manage her HF and have since signed off as her volume status has become more euvolemic following diuresis. Medicine was consulted for pr-eop evaluation and continued management of her hyponatremia. Per chart review patient is chronically, mildly hyponatremic at baseline. She takes Lasix, Toprol, spironolactone, and losartan for her HFrEF. She reports correct use of her home medications. She states her diet consists of peanut butter and crackers. Denies CP/SOB/nausea vomiting, cough fever chills, polydipsia. Her Na has been improving with diuresis. Patient has multiple risk factors that complicate surgery: acute on chronic decompensated HF, CAD with PCIx2, hypertension, hyperlipidemia, malnutrition. This being said she does not have reported/documented/observed dysfunction of liver, kidney, lung, clotting.    On examination, she is chronically ill appearing/in no acute distress/at mental baseline. AF, /58 (78), sat 98% on RA, no leukocytosis, hgb 11.8, Hct 38.5, Plts 237, moderate Hyponatremia Na 124 (initially 122 on admission) with labile Baseline, K 3.1, Cl 85, Bun 13,  Cr 00.8, Ca 7.9, albumin 2-3. BNP 2600, UOP 2300 on lasix 80mg    CXR with mild pulmonary congestion      Overview/Hospital Course:  Admitted for planned colocutaneous fistula repair, found to be hyponatremic and in ADHF. Started on IV diuresis, >6 L UOP, returned to euvolemia. Urine/serum studies consistent with hypervolemic hyponatremia most likely in 2/2 to acute on chronic HF. She does have multiple factors that maker her high risk for surgery, primarily her severrely reduced LVEF 10%. Cardiology and medicine consulted for pre-operative evaluation. Agree she is high risk, but need of her procedure outweigh the risks. 2/6 Patient tolerated colocutaneous takedown/reconstruction well. Her Hyponatremia has resolved with Lasix 80mg BID. S/p colocutaneous fistula revision with intra-op R ureteral stent placement for hydronephrosis. Developed post-op ileus. Given lasix holiday for over diuresis. Plavix, ASA restarted post-op. Anticipate discharge on prior home PO lasix dosing.     Complaint of dysuria and dark cloudy urine noted by primary team, patient started on cipro then transitioned to cefpodoxime given culture results of multiple organisms. Recently discontinued from PCA pump with PRN pain management of oxy 5 and 10 mg. Restarted lasix 40 mg on 2/14/23.       Interval History: Continued abdominal pain with eating. Benign exam. Good UOP from lasix and restarting home PO dose. Repleting lytes. Stable BP. Therapy recs for HH.    Review of Systems   Constitutional:  Negative for chills and fever.   HENT:  Negative for congestion, rhinorrhea and sore throat.    Respiratory:  Negative for cough, shortness of breath and wheezing.    Cardiovascular:  Negative for chest pain and palpitations.   Gastrointestinal:  Positive for abdominal pain (improving). Negative for nausea and vomiting.   Endocrine: Negative for polydipsia.   Genitourinary:  Negative for difficulty urinating, dysuria and hematuria.   Neurological:   Negative for dizziness, syncope, weakness, light-headedness and headaches.   Psychiatric/Behavioral:  Negative for agitation and confusion.    Objective:     Vital Signs (Most Recent):  Temp: 98 °F (36.7 °C) (02/15/23 1152)  Pulse: 94 (02/15/23 1152)  Resp: 16 (02/15/23 1316)  BP: (!) 102/52 (02/15/23 1152)  SpO2: 95 % (02/15/23 1152)   Vital Signs (24h Range):  Temp:  [96.6 °F (35.9 °C)-98.5 °F (36.9 °C)] 98 °F (36.7 °C)  Pulse:  [] 94  Resp:  [16-20] 16  SpO2:  [92 %-97 %] 95 %  BP: ()/(51-55) 102/52     Weight: 54.9 kg (121 lb)  Body mass index is 21.43 kg/m².    Intake/Output Summary (Last 24 hours) at 2/15/2023 1345  Last data filed at 2/15/2023 0544  Gross per 24 hour   Intake --   Output 1325 ml   Net -1325 ml      Physical Exam  Vitals and nursing note reviewed.   Constitutional:       General: She is not in acute distress.     Appearance: She is ill-appearing.   HENT:      Head: Normocephalic and atraumatic.      Right Ear: External ear normal.      Left Ear: External ear normal.      Nose: No congestion or rhinorrhea.   Eyes:      General:         Right eye: No discharge.         Left eye: No discharge.      Extraocular Movements: Extraocular movements intact.      Pupils: Pupils are equal, round, and reactive to light.   Cardiovascular:      Rate and Rhythm: Normal rate and regular rhythm.      Heart sounds: Normal heart sounds. No murmur heard.  Pulmonary:      Effort: No respiratory distress.      Breath sounds: No wheezing or rales (bibasilar).   Abdominal:      General: There is no distension.      Comments: Colostomy, vertical/midline abdominal excision, bandage clean/dry   Musculoskeletal:         General: No tenderness.      Cervical back: Normal range of motion. No rigidity.      Right lower leg: No edema.      Left lower leg: No edema.   Skin:     Findings: Bruising present. No erythema.   Neurological:      General: No focal deficit present.      Mental Status: She is alert and  oriented to person, place, and time.   Psychiatric:         Mood and Affect: Mood normal.         Behavior: Behavior normal.       Significant Labs: All pertinent labs within the past 24 hours have been reviewed.  BMP:   Recent Labs   Lab 02/15/23  0221      *   K 3.5   CL 90*   CO2 34*   BUN 7*   CREATININE 0.6   CALCIUM 8.4*   MG 1.4*     CBC:   Recent Labs   Lab 02/15/23  0221   WBC 5.99   HGB 10.7*   HCT 36.1*        Magnesium:   Recent Labs   Lab 02/14/23  0215 02/15/23  0221   MG 1.9 1.4*     Urine Studies: No results for input(s): COLORU, APPEARANCEUA, PHUR, SPECGRAV, PROTEINUA, GLUCUA, KETONESU, BILIRUBINUA, OCCULTUA, NITRITE, UROBILINOGEN, LEUKOCYTESUR, RBCUA, WBCUA, BACTERIA, SQUAMEPITHEL, HYALINECASTS in the last 48 hours.    Invalid input(s): WRIGHTSUR    Significant Imaging: I have reviewed all pertinent imaging results/findings within the past 24 hours.      Assessment/Plan:      * Colocutaneous fistula  S/p colocutaneous fistula revision with intra-op R ureteral stent placement for hydronephrosis. Developed post-op ileus.   Required post-op PCA pump now discontinued and on PRN opioids    - increase oxy frequency to Q4 PRN and continue bowel regimen    Acute cystitis with hematuria  Patient with new onset dysuria and urine changes. Previous history of UTI with resistance on culture including VRE, enterobacter, klebsiella and pseudomonas.     Plan  - change cipro to cefpodoxime for 2 more days end date 2/17.   - if hypotension worsens or patient febrile would recommend repeat blood cultures, lactic acid and would broaden to meropenem given previous resistance to cefepime and zosyn       Ileus  Patient has Post-operative ileus which is adynamic in etiology which is improving. This is inherent to her procedure/medications. Will treat conservatively with bowel rest, IV fluids, serial abdominal exams and avoidance of GI paralytics such as narcotics or anti-spasmodics. Monitor patient  closely.     Diet advanced 2/12    -continue management per primary CRS    Hyponatremia  Chronic mild hyponatremia on chart review, now moderate on presentation  Found to be in ADHF on arrival  BNP 2600, volume overloaded, JVD present, bilateral LE edema on consultation  CXR with mild pulmonary congestion  Reports poor diet at home (crackers/peanutbutter)  Reduced albumin likely 2/2 to malnutrition    Stable and has improved. Asymptomatic     - strict I/Os  - daily CMP  - replace electrolytes      Hypertension  Intermittent difficulties with hypotension while inpatient. Unable to tolerate full HFrEF GDMT.   Stable.    - continue Losartan 12.5 mg daily and Metoprolol 12.5 mg daily  - Hold losartan if SBP<95    Hypercholesterolemia  Lipids well controlled  Has documented statin intolerance    - continue ezetimibe    Coronary artery disease  History of Cad with PCIx2 LAD in 2012 and LCX 1/2022  Home regimen: DAPT    No further surgical intervention planned inpatient by CRS or urology.     - continue aspirin  - continue clopidogrel    Ischemic cardiomyopathy  Cardiology consulted for management of ADHF, now signed off  Recent echo showed EF of 10%  Patient hypervolemic on presentation, appears more euvolemic today  Home GDMT: Toprol-XL 25, losartan 25 mg.  Initial CXR without new detrimental changes since prior, BNP 2600 on arrival    Recommendations:   - Restart lasix 40 mg daily PO. 1x dose of IV 40 mg on 2/14  - anticipate discharge on pta PO lasix regimen  - replete Mag>2, K>4  - continue Toprol-XL 12.5 per cardiology recommendations  - losartan 12.5 mg qd given hypotension with hold parameters  - continue losartan 12.5mg daily per cardiology req  - will include hold parameters for hypotension  - would avoid IV anti-hypertensive  - holding home spironolactone, unable to tolerate full HFrEF GDMT      VTE Risk Mitigation (From admission, onward)         Ordered     enoxaparin injection 40 mg  Daily         02/07/23  1659     Place sequential compression device  Until discontinued         02/06/23 1447     IP VTE HIGH RISK PATIENT  Once         02/06/23 1447                Discharge Planning   SIVAN: 2/17/2023     Code Status: Full Code   Is the patient medically ready for discharge?:     Reason for patient still in hospital (select all that apply): Patient trending condition  Discharge Plan A: Home Health                  Ml Lagos DO  Department of Hospital Medicine   [facility]

## 2023-02-15 NOTE — PT/OT/SLP PROGRESS
Occupational Therapy      Patient Name:  Odette Hart   MRN:  [id number]    Patient not seen today secondary to patient declining OT during time of attempt due to 9/10 pain and recently receiving pain medication. This therapist is unable to return later this date. Will follow-up for therapy as scheduled.    2/15/2023

## 2023-02-15 NOTE — ASSESSMENT & PLAN NOTE
History of Cad with PCIx2 LAD in 2012 and LCX 1/2022  Home regimen: DAPT    No further surgical intervention planned inpatient by CRS or urology.     - continue aspirin  - continue clopidogrel   done

## 2023-02-15 NOTE — PROGRESS NOTES
"[facility]  Adult Nutrition  Progress Note    SUMMARY       Recommendations    1) Continue regular diet   -Monitor tolerance  - Honor food preferences   2) Continue Boost Plus TID  3) RD following    Goals: meets % een/epn by next rd f/u  Nutrition Goal Status: progressing towards goal  Communication of RD Recs: other (comment) (POC)    Assessment and Plan    Nutrition Problem  Increased nutritional needs     Related to (etiology):   Condition associated with diagnoses     Signs and Symptoms (as evidenced by):   Wounds, CLD     Interventions   Collaboration with other providers  Commercial Beverage     Nutrition Diagnosis Status:   Continues        Reason for Assessment    Reason For Assessment: RD follow-up  Diagnosis: gastrointestinal disease (Colocutaneous fistula)  Relevant Medical History: HTN, CAD , HFrEH, MDD, severe malnutrition, cholecystectomy  Interdisciplinary Rounds: did not attend  General Information Comments: Pt seen for f/u, eating bites of meals. Preferences added to my dining. Endorses pain when eating regular diet. Endorses nausea, no vomiting. Showed RD limited output via ileostomy. Drinking vanilla boost during f/u. BMI 21.97. Wts stable. Noted w/ surgical wound to abdomen. NFPE not warranted at this time. RD following.    Nutrition Risk Screen    Nutrition Risk Screen: no indicators present    Nutrition/Diet History    Spiritual, Cultural Beliefs, Denominational Practices, Values that Affect Care: no  Food Allergies: shellfish    Anthropometrics    Temp: 98 °F (36.7 °C)  Height Method: Stated  Height: 5' 3" (160 cm)  Height (inches): 63 in  Weight Method: Standard Scale  Weight: 54.9 kg (121 lb)  Weight (lb): 121 lb  Ideal Body Weight (IBW), Female: 115 lb  % Ideal Body Weight, Female (lb): 107.83 %  BMI (Calculated): 21.4       Lab/Procedures/Meds    Pertinent Labs Reviewed: reviewed  Pertinent Labs Comments: H/H: 10.7/36.1, MCH: 23.3, MCHC: 29.6, Na: 132, BUN: 7, [place]: 8.4, mag: " 1.4  Pertinent Medications Reviewed: reviewed  Pertinent Medications Comments: enoxaparin, famotidine, furosemide, polyethylene glycol      Estimated/Assessed Needs    Weight Used For Calorie Calculations: 56.2 kg (124 lb) (Actual BW)  Energy Calorie Requirements (kcal): 0501-8105 (20-25 Kcal/kg (actual BW))  Energy Need Method: Kcal/kg  Protein Requirements: 73-84 (1.2-1.4 g/kg (actual BW))  Weight Used For Protein Calculations: 56.2 kg (124 lb) (acutal BW)        RDA Method (mL): 1124         Nutrition Prescription Ordered    Current Diet Order: regular diet  Oral Nutrition Supplement: Boost plus    Evaluation of Received Nutrient/Fluid Intake    I/O: -14.3 L since 2/1  Energy Calories Required: not meeting needs  Protein Required: not meeting needs  Comments: 2/14 ileostomy  Tolerance: not tolerating  % Intake of Estimated Energy Needs: 0 - 25 %  % Meal Intake: 25 - 50 %    Nutrition Risk    Level of Risk/Frequency of Follow-up: low (1x/week)     Monitor and Evaluation    Food and Nutrient Intake: energy intake, food and beverage intake  Food and Nutrient Adminstration: diet order  Knowledge/Beliefs/Attitudes: food and nutrition knowledge/skill, beliefs and attitudes  Physical Activity and Function: nutrition-related ADLs and IADLs  Anthropometric Measurements: height/length, weight, weight change, body mass index  Biochemical Data, Medical Tests and Procedures: electrolyte and renal panel, gastrointestinal profile, glucose/endocrine profile, inflammatory profile, lipid profile  Nutrition-Focused Physical Findings: overall appearance     Nutrition Follow-Up    RD Follow-up?: Yes    Justina Perez, Registration Eligible, Provisional LDN

## 2023-02-15 NOTE — PLAN OF CARE
POC reviewed with pt.  Pt A & O x 4, adequate urine output via external catheter. Vital signs stable on room air. Pain controlled with prn pain meds.  Pt not eating food well but is drinking boosts.

## 2023-02-15 NOTE — ASSESSMENT & PLAN NOTE
Patient with new onset dysuria and urine changes. Previous history of UTI with resistance on culture including VRE, enterobacter, klebsiella and pseudomonas.     Plan  - change cipro to cefpodoxime for 2 more days end date 2/17.   - if hypotension worsens or patient febrile would recommend repeat blood cultures, lactic acid and would broaden to meropenem given previous resistance to cefepime and zosyn

## 2023-02-15 NOTE — SUBJECTIVE & OBJECTIVE
[address]7 [facility] [address] 200 [facility] 
[phone] Patient: Sarah Mayen MRN: [id number] IHU:[id number] Visit Information Date & Time Provider Department Dept. Phone Encounter #  
 5/29/2018  9:00 AM Karolyn Jones [facility] [phone] [id number] Follow-up Instructions Return for Keep previously scheduled appointment. Your Appointments 6/1/2018  1:30 PM  
Follow Up with Caty Bowens DO [address] ([facility]) Appt Note: from 1/8/18  
 [address] [place] [address], [address] [address]  
[phone]  
  
   
 [address] [place] [address], [address] [facility],[address] [place] [address] 6/8/2018  4:00 PM  
Follow Up with Dash Alatorre MD  
[facility] 1 ([facility]) Appt Note: 1 year follow up; pt r/s provider out of office. mg  
 [place] [address] [address] [id number] [phone] [facility] 53 [phone]  
  
    
 7/6/2018  2:00 PM  
FOLLOW UP EXAM with Karolyn Jones MD  
[facility] ([facility]) Appt Note: routine f/u 3mo [address] [place] [address] [address] 200 [facility] [address]  
[address] Upcoming Health Maintenance Date Due Influenza Age 5 to Adult 8/1/2018 MEDICARE YEARLY EXAM 10/4/2018 GLAUCOMA SCREENING Q2Y 3/29/2019 BREAST CANCER SCRN MAMMOGRAM 10/3/2019 DTaP/Tdap/Td series (2 - Td) 8/1/2026 COLONOSCOPY 8/1/2026 Allergies as of 5/29/2018  Review Complete On: 5/29/2018 By: Rose Hanley No Known Allergies Current Immunizations  Reviewed on 3/20/2012 Name Date Influenza High Dose Vaccine PF 10/3/2017, 10/3/2016 Influenza Vaccine 1/24/2014 Influenza Vaccine (Quad) PF 10/8/2015 Interval History: Continued abdominal pain with eating. Benign exam. Good UOP from lasix and restarting home PO dose. Repleting lytes. Stable BP. Therapy recs for HH.    Review of Systems   Constitutional:  Negative for chills and fever.   HENT:  Negative for congestion, rhinorrhea and sore throat.    Respiratory:  Negative for cough, shortness of breath and wheezing.    Cardiovascular:  Negative for chest pain and palpitations.   Gastrointestinal:  Positive for abdominal pain (improving). Negative for nausea and vomiting.   Endocrine: Negative for polydipsia.   Genitourinary:  Negative for difficulty urinating, dysuria and hematuria.   Neurological:  Negative for dizziness, syncope, weakness, light-headedness and headaches.   Psychiatric/Behavioral:  Negative for agitation and confusion.    Objective:     Vital Signs (Most Recent):  Temp: 98 °F (36.7 °C) (02/15/23 1152)  Pulse: 94 (02/15/23 1152)  Resp: 16 (02/15/23 1316)  BP: (!) 102/52 (02/15/23 1152)  SpO2: 95 % (02/15/23 1152)   Vital Signs (24h Range):  Temp:  [96.6 °F (35.9 °C)-98.5 °F (36.9 °C)] 98 °F (36.7 °C)  Pulse:  [] 94  Resp:  [16-20] 16  SpO2:  [92 %-97 %] 95 %  BP: ()/(51-55) 102/52     Weight: 54.9 kg (121 lb)  Body mass index is 21.43 kg/m².    Intake/Output Summary (Last 24 hours) at 2/15/2023 1345  Last data filed at 2/15/2023 0544  Gross per 24 hour   Intake --   Output 1325 ml   Net -1325 ml      Physical Exam  Vitals and nursing note reviewed.   Constitutional:       General: She is not in acute distress.     Appearance: She is ill-appearing.   HENT:      Head: Normocephalic and atraumatic.      Right Ear: External ear normal.      Left Ear: External ear normal.      Nose: No congestion or rhinorrhea.   Eyes:      General:         Right eye: No discharge.         Left eye: No discharge.      Extraocular Movements: Extraocular movements intact.      Pupils: Pupils are equal, round, and reactive to light.   Cardiovascular:      Rate  Pneumococcal Polysaccharide (PPSV-23) 1/2/2013 12:00 AM  
 ZZZ-RETIRED (DO NOT USE) Pneumococcal Vaccine (Unspecified Type) 7/31/2012 Not reviewed this visit You Were Diagnosed With   
  
 Codes Comments Earache on left    -  Primary ICD-10-CM: H92.02 
ICD-9-CM: 388.70 Herpes zoster with other complication     HTO-83-FQ: B02.8 ICD-9-CM: 053.79 post herpatic neuralgia Vitals BP Pulse Temp Resp Height(growth percentile) SpO2  
 116/70 (BP 1 Location: Left arm, BP Patient Position: Sitting) 65 97.4 °F (36.3 °C) (Oral) 16 4' 10\" (1.473 m) 93% OB Status Smoking Status Postmenopausal Former Smoker Preferred Pharmacy Pharmacy Name Phone [address] [phone] Your Updated Medication List  
  
   
This list is accurate as of 5/29/18  9:24 AM.  Always use your most recent med list.  
  
  
  
  
 aspirin 81 mg chewable tablet Take 81 mg by mouth daily. carvedilol 3.125 mg tablet Commonly known as:  Rebeka Ni Take 1 Tab by mouth two (2) times a day. COLCHICINE PO Take  by mouth.  
  
 ergocalciferol 50,000 unit capsule Commonly known as:  DRISDOL Take 1 Cap by mouth every seven (7) days. furosemide 40 mg tablet Commonly known as:  LASIX Take 1.5 Tabs by mouth daily. lidocaine 5 % topical cream  
Apply  to affected area two (2) times daily as needed for Itching. lisinopril 5 mg tablet Commonly known as:  Dorathy Massed Take 1 Tab by mouth daily. OTHER Please replace CPAP mask with mask of choice Please replace/repair CPAP supplies DX- REJI [phone]  
  
 umeclidinium-vilanterol 62.5-25 mcg/actuation inhaler Commonly known as:  Amy Thomas Take 1 Puff by inhalation daily. warfarin 7.5 mg tablet Commonly known as:  COUMADIN Take 7.5 mg by mouth daily. Alt with 5mg Prescriptions Sent to Pharmacy Refills and Rhythm: Normal rate and regular rhythm.      Heart sounds: Normal heart sounds. No murmur heard.  Pulmonary:      Effort: No respiratory distress.      Breath sounds: No wheezing or rales (bibasilar).   Abdominal:      General: There is no distension.      Comments: Colostomy, vertical/midline abdominal excision, bandage clean/dry   Musculoskeletal:         General: No tenderness.      Cervical back: Normal range of motion. No rigidity.      Right lower leg: No edema.      Left lower leg: No edema.   Skin:     Findings: Bruising present. No erythema.   Neurological:      General: No focal deficit present.      Mental Status: She is alert and oriented to person, place, and time.   Psychiatric:         Mood and Affect: Mood normal.         Behavior: Behavior normal.       Significant Labs: All pertinent labs within the past 24 hours have been reviewed.  BMP:   Recent Labs   Lab 02/15/23  0221      *   K 3.5   CL 90*   CO2 34*   BUN 7*   CREATININE 0.6   CALCIUM 8.4*   MG 1.4*     CBC:   Recent Labs   Lab 02/15/23  0221   WBC 5.99   HGB 10.7*   HCT 36.1*        Magnesium:   Recent Labs   Lab 02/14/23  0215 02/15/23  0221   MG 1.9 1.4*     Urine Studies: No results for input(s): COLORU, APPEARANCEUA, PHUR, SPECGRAV, PROTEINUA, GLUCUA, KETONESU, BILIRUBINUA, OCCULTUA, NITRITE, UROBILINOGEN, LEUKOCYTESUR, RBCUA, WBCUA, BACTERIA, SQUAMEPITHEL, HYALINECASTS in the last 48 hours.    Invalid input(s): WRIGHTSUR    Significant Imaging: I have reviewed all pertinent imaging results/findings within the past 24 hours.   lidocaine 5 % topical cream 1 Sig: Apply  to affected area two (2) times daily as needed for Itching. Class: Normal  
 Pharmacy: [address], [address] [place] [address] #: [phone] Route: Topical  
  
We Performed the Following REFERRAL TO ENT-OTOLARYNGOLOGY [QDS71 Custom] Follow-up Instructions Return for Keep previously scheduled appointment. Referral Information Referral ID Referred By Referred To  
  
 [id number] [facility], [address] [place] [address], [place] [address] [place], [address] Phone: [phone] Fax: [phone] Visits Status Start Date End Date 1 New Request 5/29/18 5/29/19 If your referral has a status of pending review or denied, additional information will be sent to support the outcome of this decision. Patient Instructions Earache: Care Instructions Your Care Instructions Even though infection is a common cause of ear pain, not all ear pain means an infection. If you have ear pain and don't have an infection, it could be because of a jaw problem, such as temporomandibular joint (TMJ) pain. Or it could be because of a neck problem. When ear discomfort or pain is mild or comes and goes without other symptoms, home treatment may be all you need. Follow-up care is a key part of your treatment and safety. Be sure to make and go to all appointments, and call your doctor if you are having problems. It's also a good idea to know your test results and keep a list of the medicines you take. How can you care for yourself at home? · Apply heat on the ear to ease pain. To apply heat, put a warm water bottle, a heating pad set on low, or a warm cloth on your ear. Do not go to sleep with a heating pad on your skin. · Take an over-the-counter pain medicine, such as acetaminophen (Tylenol), ibuprofen (Advil, Motrin), or naproxen (Aleve). Be safe with medicines. Read and follow all instructions on the label. · Do not take two or more pain medicines at the same time unless the doctor told you to. Many pain medicines have acetaminophen, which is Tylenol. Too much acetaminophen (Tylenol) can be harmful. · Never insert anything, such as a cotton swab or a ava pin, into the ear. When should you call for help? Call your doctor now or seek immediate medical care if: 
? · You have new or worse symptoms of infection, such as: 
¨ Increased pain, swelling, warmth, or redness. ¨ Red streaks leading from the area. ¨ Pus draining from the area. ¨ A fever. ? Watch closely for changes in your health, and be sure to contact your doctor if: 
? · You have new or worse discharge coming from the ear. ? · You do not get better as expected. Where can you learn more? Go to http://vinicius-cherry.info/. Enter N659 in the search box to learn more about \"Earache: Care Instructions. \" Current as of: May 12, 2017 Content Version: 11.4 © 4491-9670 [employer]. Care instructions adapted under license by [employer] (which disclaims liability or warranty for this information). If you have questions about a medical condition or this instruction, always ask your healthcare professional. [address] any warranty or liability for your use of this information. Introducing [facility]! Willadean Saint introduces Lect[employer] patient portal. Now you can access parts of your medical record, email your doctor's office, and request medication refills online. 1. In your internet browser, go to [email or web address] 2. Click on the First Time User? Click Here link in the Sign In box. You will see the New Member Sign Up page. 3. Enter your Lecturio Access Code exactly as it appears below. You will not need to use this code after youve completed the sign-up process.  If you do not sign up before the expiration date, you must request a new code. · klinify Access Code: [email or web address] Expires: 6/9/2018  5:25 AM 
 
4. Enter the last four digits of your Social Security Number (xxxx) and Date of Birth (mm/dd/yyyy) as indicated and click Submit. You will be taken to the next sign-up page. 5. Create a klinify ID. This will be your klinify login ID and cannot be changed, so think of one that is secure and easy to remember. 6. Create a klinify password. You can change your password at any time. 7. Enter your Password Reset Question and Answer. This can be used at a later time if you forget your password. 8. Enter your e-mail address. You will receive e-mail notification when new information is available in [address] 9. Click Sign Up. You can now view and download portions of your medical record. 10. Click the Download Summary menu link to download a portable copy of your medical information. If you have questions, please visit the Frequently Asked Questions section of the klinify website. Remember, klinify is NOT to be used for urgent needs. For medical emergencies, dial 911. Now available from your iPhone and Android! Please provide this summary of care documentation to your next provider. Your primary care clinician is listed as Rahul Shah. If you have any questions after today's visit, please call [phone].

## 2023-02-16 NOTE — PLAN OF CARE
Received pt for care at 0700, pt in bed resting. MAR, labs, and chart reviewed. Pt A&Ox4, hypotensive, however other VS WNL. Pt able to maintain O2 sats >92% on room air. Pt c/o pain, PRN Oxy 5mg given x1 and Oxy 10mg given x1 with moderate effect. Pt c/o nausea, PRN Zofran given x1 with good effect. Pt abdominal incision well approximated with moderate drainage noted, MD aware and dressing changes performed by MD. Pt has little output noted from ostomy during shift. Pt using purewick to void. Pt worked with PT during shift, see note. Pt helped turn and reposition q2 hours. Pt remained free of falls, acute injury, and new skin breakdown. Pt bed in lowest position, wheels locked, and call bell within reach.      Problem: Adult Inpatient Plan of Care  Goal: Plan of Care Review  Outcome: Ongoing, Progressing  Goal: Patient-Specific Goal (Individualized)  Outcome: Ongoing, Progressing  Goal: Absence of Hospital-Acquired Illness or Injury  Outcome: Ongoing, Progressing  Goal: Optimal Comfort and Wellbeing  Outcome: Ongoing, Progressing  Goal: Readiness for Transition of Care  Outcome: Ongoing, Progressing     Problem: Fall Injury Risk  Goal: Absence of Fall and Fall-Related Injury  Outcome: Ongoing, Progressing     Problem: Impaired Wound Healing  Goal: Optimal Wound Healing  Outcome: Ongoing, Progressing     Problem: Infection  Goal: Absence of Infection Signs and Symptoms  Outcome: Ongoing, Progressing     Problem: Skin Injury Risk Increased  Goal: Skin Health and Integrity  Outcome: Ongoing, Progressing

## 2023-02-16 NOTE — ASSESSMENT & PLAN NOTE
Chronic mild hyponatremia on chart review, now moderate on presentation  Found to be in ADHF on arrival  BNP 2600, volume overloaded, JVD present, bilateral LE edema on consultation  CXR with mild pulmonary congestion  Reports poor diet at home (crackers/peanutbutter)  Reduced albumin likely 2/2 to malnutrition    Previously due to ADHF. Resolved but concern for secondary cause of hypovolemic hyponatremia as patient has had poor PO intake 2/2 abdominal pain    - consider trial of IVF if worsening on labs on 2/17  - strict I/Os  - daily CMP  - replace electrolytes

## 2023-02-16 NOTE — ASSESSMENT & PLAN NOTE
Patient with new onset dysuria and urine changes. Previous history of UTI with resistance on culture including VRE, enterobacter, klebsiella and pseudomonas.     Plan  - cefpodoxime end date 2/17.   - if hypotension worsens or patient febrile would recommend repeat blood cultures, lactic acid and would broaden to meropenem given previous resistance to cefepime and zosyn

## 2023-02-16 NOTE — PLAN OF CARE
02/16/23 1216   Discharge Reassessment   Assessment Type Discharge Planning Reassessment   Did the patient's condition or plan change since previous assessment? No   Discharge Plan discussed with: Patient   Discharge Plan A Home Health   DME Needed Upon Discharge  other (see comments)  (TBD)   Discharge Barriers Identified None   Post-Acute Status   Discharge Delays (!) Waiting for Provider to Speak to Patient     Patient is being monitored closely per medical team.  Will speak to team regarding an discharge date and appropriate placement.   CM will cont to monitor dc needs      Eneida Hassan RN  Case Management  [facility]  [phone]

## 2023-02-16 NOTE — PROGRESS NOTES
[facility]  Progress Note    Patient Name: Odette Hart  MRN: [id number]  Patient Class: IP- Inpatient   Admission Date: 1/31/2023  Length of Stay: 16 days  Attending Physician: Rafa Cardozo MD  Primary Care Provider: Braxton Barragan MD        Subjective:     Principal Problem:Colocutaneous fistula        HPI:  66 yo F with HTN, HLD (statin intolerance), DM, CAD s/p PCI to LAD (2012)/LCX (1/2022), HFrEF (EF10%), debility, severe malnutrition, and the following procedures performed 5/2022: cholecystectomy, takedown of colovesical and colovaginal fistula, sigmoid colectomy, partial cecectomy, end colostomy who presents for colocutaneous fistula repair with abdominal wall reconstruction. On admission she was found to be hyponatremic and in acute on chronic decompensated heart failure. Cardiology was consulted to manage her HF and have since signed off as her volume status has become more euvolemic following diuresis. Medicine was consulted for pr-eop evaluation and continued management of her hyponatremia. Per chart review patient is chronically, mildly hyponatremic at baseline. She takes Lasix, Toprol, spironolactone, and losartan for her HFrEF. She reports correct use of her home medications. She states her diet consists of peanut butter and crackers. Denies CP/SOB/nausea vomiting, cough fever chills, polydipsia. Her Na has been improving with diuresis. Patient has multiple risk factors that complicate surgery: acute on chronic decompensated HF, CAD with PCIx2, hypertension, hyperlipidemia, malnutrition. This being said she does not have reported/documented/observed dysfunction of liver, kidney, lung, clotting.    On examination, she is chronically ill appearing/in no acute distress/at mental baseline. AF, /58 (78), sat 98% on RA, no leukocytosis, hgb 11.8, Hct 38.5, Plts 237, moderate Hyponatremia Na 124 (initially 122 on admission) with labile Baseline, K 3.1, Cl 85, Bun 13,  Cr 00.8, Ca 7.9, albumin 2-3. BNP 2600, UOP 2300 on lasix 80mg    CXR with mild pulmonary congestion      Overview/Hospital Course:  Admitted for planned colocutaneous fistula repair, found to be hyponatremic and in ADHF. Started on IV diuresis, >6 L UOP, returned to euvolemia. Urine/serum studies consistent with hypervolemic hyponatremia most likely in 2/2 to acute on chronic HF. She does have multiple factors that maker her high risk for surgery, primarily her severrely reduced LVEF 10%. Cardiology and medicine consulted for pre-operative evaluation. Agree she is high risk, but need of her procedure outweigh the risks. 2/6 Patient tolerated colocutaneous takedown/reconstruction well. Her Hyponatremia has resolved with Lasix 80mg BID. S/p colocutaneous fistula revision with intra-op R ureteral stent placement for hydronephrosis. Developed post-op ileus. Given lasix holiday for over diuresis. Plavix, ASA restarted post-op. Anticipate discharge on prior home PO lasix dosing.     Complaint of dysuria and dark cloudy urine noted by primary team, patient started on cipro then transitioned to cefpodoxime given culture results of multiple organisms. Recently discontinued from PCA pump with PRN pain management of oxy 5 and 10 mg. Restarted lasix 20 mg on 2/14/23.       Interval History: Continued hypotension and morning GDMT held. Patient reported orthostasis and bleeding from incision site. Bedside intervention done by CRS per patient. CRP uptrending. Dysuria resolved. Encouraging PO intake and currently on CLD.     Review of Systems   Constitutional:  Negative for chills and fever.   HENT:  Negative for congestion, rhinorrhea and sore throat.    Eyes:  Negative for pain and visual disturbance.   Respiratory:  Negative for cough, shortness of breath and wheezing.    Cardiovascular:  Negative for chest pain and palpitations.   Gastrointestinal:  Positive for abdominal pain (improving). Negative for blood in stool,  diarrhea, nausea and vomiting.   Endocrine: Negative for polydipsia.   Genitourinary:  Negative for difficulty urinating, dysuria and hematuria.   Neurological:  Negative for dizziness, syncope, weakness, light-headedness and headaches.   Psychiatric/Behavioral:  Negative for agitation and confusion.    Objective:     Vital Signs (Most Recent):  Temp: 99.3 °F (37.4 °C) (02/16/23 1118)  Pulse: 78 (02/16/23 1118)  Resp: 16 (02/16/23 1118)  BP: (!) 101/59 (02/16/23 1118)  SpO2: (!) 94 % (02/16/23 1118)   Vital Signs (24h Range):  Temp:  [97.4 °F (36.3 °C)-99.3 °F (37.4 °C)] 99.3 °F (37.4 °C)  Pulse:  [76-94] 78  Resp:  [16-18] 16  SpO2:  [92 %-97 %] 94 %  BP: ()/(45-59) 101/59     Weight: 54.9 kg (121 lb)  Body mass index is 21.43 kg/m².    Intake/Output Summary (Last 24 hours) at 2/16/2023 1350  Last data filed at 2/16/2023 1318  Gross per 24 hour   Intake 600 ml   Output 200 ml   Net 400 ml      Physical Exam  Vitals and nursing note reviewed.   Constitutional:       General: She is not in acute distress.     Appearance: She is ill-appearing.   HENT:      Head: Normocephalic and atraumatic.      Right Ear: External ear normal.      Left Ear: External ear normal.      Nose: No congestion or rhinorrhea.   Eyes:      General:         Right eye: No discharge.         Left eye: No discharge.      Extraocular Movements: Extraocular movements intact.      Pupils: Pupils are equal, round, and reactive to light.   Cardiovascular:      Rate and Rhythm: Normal rate and regular rhythm.      Heart sounds: Normal heart sounds. No murmur heard.  Pulmonary:      Effort: No respiratory distress.      Breath sounds: No wheezing or rales (bibasilar).   Abdominal:      General: There is no distension.      Comments: Colostomy, vertical/midline abdominal excision, bandage clean/dry   Musculoskeletal:         General: No tenderness.      Cervical back: Normal range of motion. No rigidity.      Right lower leg: No edema.      Left  lower leg: No edema.   Skin:     Findings: Bruising present. No erythema.   Neurological:      General: No focal deficit present.      Mental Status: She is alert and oriented to person, place, and time.   Psychiatric:         Mood and Affect: Mood normal.         Behavior: Behavior normal.       Significant Labs: All pertinent labs within the past 24 hours have been reviewed.  CBC:   Recent Labs   Lab 02/15/23  0221   WBC 5.99   HGB 10.7*   HCT 36.1*        CMP:   Recent Labs   Lab 02/15/23  0221 02/16/23  0327   * 130*   K 3.5 4.3   CL 90* 87*   CO2 34* 34*    85   BUN 7* 14   CREATININE 0.6 0.6   CALCIUM 8.4* 8.7   ANIONGAP 8 9       Significant Imaging: I have reviewed all pertinent imaging results/findings within the past 24 hours.      Assessment/Plan:      * Colocutaneous fistula  S/p colocutaneous fistula revision with intra-op R ureteral stent placement for hydronephrosis. Developed post-op ileus.   Required post-op PCA pump now discontinued and on PRN opioids    - increase oxy frequency to Q4 PRN and continue bowel regimen  - reported bedside intervention with CRS. Wound care    Acute cystitis with hematuria  Patient with new onset dysuria and urine changes. Previous history of UTI with resistance on culture including VRE, enterobacter, klebsiella and pseudomonas.     Plan  - cefpodoxime end date 2/17.   - if hypotension worsens or patient febrile would recommend repeat blood cultures, lactic acid and would broaden to meropenem given previous resistance to cefepime and zosyn       Ileus  Patient has Post-operative ileus which is adynamic in etiology which is improving. This is inherent to her procedure/medications. Will treat conservatively with bowel rest, IV fluids, serial abdominal exams and avoidance of GI paralytics such as narcotics or anti-spasmodics. Monitor patient closely.     Diet advanced 2/12    -continue management per primary CRS    Hyponatremia  Chronic mild hyponatremia  on chart review, now moderate on presentation  Found to be in ADHF on arrival  BNP 2600, volume overloaded, JVD present, bilateral LE edema on consultation  CXR with mild pulmonary congestion  Reports poor diet at home (crackers/peanutbutter)  Reduced albumin likely 2/2 to malnutrition    Previously due to ADHF. Resolved but concern for secondary cause of hypovolemic hyponatremia as patient has had poor PO intake 2/2 abdominal pain    - consider trial of IVF if worsening on labs on 2/17  - strict I/Os  - daily CMP  - replace electrolytes      Hypertension  Intermittent difficulties with hypotension while inpatient. Unable to tolerate full HFrEF GDMT.   Continued hypotension while admitted and symptomatic with ambulation. Will continue to titrate.     - continue Losartan 12.5 mg daily and Metoprolol 12.5 mg daily  - Hold GDMT if SBP<95  - encourage PO intake     Hypercholesterolemia  Lipids well controlled  Has documented statin intolerance    - continue ezetimibe    Coronary artery disease  History of Cad with PCIx2 LAD in 2012 and LCX 1/2022  Home regimen: DAPT    No further surgical intervention planned inpatient by CRS or urology.     - continue aspirin  - continue clopidogrel    Ischemic cardiomyopathy  Cardiology consulted for management of ADHF, now signed off  Recent echo showed EF of 10%  Patient hypervolemic on presentation, appears more euvolemic today  Home GDMT: Toprol-XL 25, losartan 25 mg.  Initial CXR without new detrimental changes since prior, BNP 2600 on arrival  1x IV lasix on 2/14    Recommendations:   - Restart lasix 20 mg daily PO. Some concern for hypovolemia and if persistent will consider holding again and attempting QOD lasix. Consideration for IVF bolus depending on BP trends over next 24 hours  - anticipate discharge on pta PO lasix regimen  - replete Mag>2, K>4  - continue Toprol-XL 12.5 per cardiology recommendations with hold parameters  - losartan 12.5 mg qd given hypotension with  hold parameters  - would avoid IV anti-hypertensive  - holding home spironolactone, unable to tolerate full HFrEF GDMT      VTE Risk Mitigation (From admission, onward)         Ordered     enoxaparin injection 40 mg  Daily         02/07/23 1659     Place sequential compression device  Until discontinued         02/06/23 1447     IP VTE HIGH RISK PATIENT  Once         02/06/23 1447                Discharge Planning   SIVAN: 2/18/2023     Code Status: Full Code   Is the patient medically ready for discharge?:     Reason for patient still in hospital (select all that apply): Patient trending condition  Discharge Plan A: Home Health   Discharge Delays: (!) Waiting for Provider to Speak to Patient              Ml Lagos DO  Department of Hospital Medicine   [facility]

## 2023-02-16 NOTE — SUBJECTIVE & OBJECTIVE
Interval History: Continued hypotension and morning GDMT held. Patient reported orthostasis and bleeding from incision site. Bedside intervention done by CRS per patient. CRP uptrending. Dysuria resolved. Encouraging PO intake and currently on CLD.     Review of Systems   Constitutional:  Negative for chills and fever.   HENT:  Negative for congestion, rhinorrhea and sore throat.    Eyes:  Negative for pain and visual disturbance.   Respiratory:  Negative for cough, shortness of breath and wheezing.    Cardiovascular:  Negative for chest pain and palpitations.   Gastrointestinal:  Positive for abdominal pain (improving). Negative for blood in stool, diarrhea, nausea and vomiting.   Endocrine: Negative for polydipsia.   Genitourinary:  Negative for difficulty urinating, dysuria and hematuria.   Neurological:  Negative for dizziness, syncope, weakness, light-headedness and headaches.   Psychiatric/Behavioral:  Negative for agitation and confusion.    Objective:     Vital Signs (Most Recent):  Temp: 99.3 °F (37.4 °C) (02/16/23 1118)  Pulse: 78 (02/16/23 1118)  Resp: 16 (02/16/23 1118)  BP: (!) 101/59 (02/16/23 1118)  SpO2: (!) 94 % (02/16/23 1118)   Vital Signs (24h Range):  Temp:  [97.4 °F (36.3 °C)-99.3 °F (37.4 °C)] 99.3 °F (37.4 °C)  Pulse:  [76-94] 78  Resp:  [16-18] 16  SpO2:  [92 %-97 %] 94 %  BP: ()/(45-59) 101/59     Weight: 54.9 kg (121 lb)  Body mass index is 21.43 kg/m².    Intake/Output Summary (Last 24 hours) at 2/16/2023 1350  Last data filed at 2/16/2023 1318  Gross per 24 hour   Intake 600 ml   Output 200 ml   Net 400 ml      Physical Exam  Vitals and nursing note reviewed.   Constitutional:       General: She is not in acute distress.     Appearance: She is ill-appearing.   HENT:      Head: Normocephalic and atraumatic.      Right Ear: External ear normal.      Left Ear: External ear normal.      Nose: No congestion or rhinorrhea.   Eyes:      General:         Right eye: No discharge.          Left eye: No discharge.      Extraocular Movements: Extraocular movements intact.      Pupils: Pupils are equal, round, and reactive to light.   Cardiovascular:      Rate and Rhythm: Normal rate and regular rhythm.      Heart sounds: Normal heart sounds. No murmur heard.  Pulmonary:      Effort: No respiratory distress.      Breath sounds: No wheezing or rales (bibasilar).   Abdominal:      General: There is no distension.      Comments: Colostomy, vertical/midline abdominal excision, bandage clean/dry   Musculoskeletal:         General: No tenderness.      Cervical back: Normal range of motion. No rigidity.      Right lower leg: No edema.      Left lower leg: No edema.   Skin:     Findings: Bruising present. No erythema.   Neurological:      General: No focal deficit present.      Mental Status: She is alert and oriented to person, place, and time.   Psychiatric:         Mood and Affect: Mood normal.         Behavior: Behavior normal.       Significant Labs: All pertinent labs within the past 24 hours have been reviewed.  CBC:   Recent Labs   Lab 02/15/23  0221   WBC 5.99   HGB 10.7*   HCT 36.1*        CMP:   Recent Labs   Lab 02/15/23  0221 02/16/23  0327   * 130*   K 3.5 4.3   CL 90* 87*   CO2 34* 34*    85   BUN 7* 14   CREATININE 0.6 0.6   CALCIUM 8.4* 8.7   ANIONGAP 8 9       Significant Imaging: I have reviewed all pertinent imaging results/findings within the past 24 hours.

## 2023-02-16 NOTE — ASSESSMENT & PLAN NOTE
S/p colocutaneous fistula revision with intra-op R ureteral stent placement for hydronephrosis. Developed post-op ileus.   Required post-op PCA pump now discontinued and on PRN opioids    - increase oxy frequency to Q4 PRN and continue bowel regimen  - reported bedside intervention with CRS. Wound care

## 2023-02-16 NOTE — PT/OT/SLP PROGRESS
"Physical Therapy Treatment    Patient Name:  Odette Hart   MRN:  [id number]    Recommendations:     Discharge Recommendations: home health PT  Discharge Equipment Recommendations: none  Barriers to discharge: None    Assessment:     Odette Hart is a 67 y.o. female admitted with a medical diagnosis of Colocutaneous fistula.  She presents with the following impairments/functional limitations: weakness, impaired endurance, impaired self care skills, impaired functional mobility, gait instability, impaired balance. Pt required frequent encouragement to participate in physical therapy session. Pt expressed the want to try getting out on the L side of bed today instead of the R. Pt able to amb 25 feet with RW and CGA. Pt would continue to benefit from acute skilled physical therapy for continued amb and improve functional mobility.    Rehab Prognosis: Good; patient would benefit from acute skilled PT services to address these deficits and reach maximum level of function.    Recent Surgery: Procedure(s) (LRB):  REVISION, COLOSTOMY (N/A)  INSERTION, CATHETER, URETER, BILATERAL (Left)  CYSTOSCOPY, WITH URETERAL STENT INSERTION (Right)  DEBRIDEMENT, ABDOMEN (N/A)  COLECTOMY, PARTIAL  LYSIS, ADHESIONS (N/A) 10 Days Post-Op    Plan:     During this hospitalization, patient to be seen 3 x/week to address the identified rehab impairments via gait training, therapeutic activities, therapeutic exercises and progress toward the following goals:    Plan of Care Expires:  03/09/23    Subjective     Chief Complaint: Incision bleeding.  Patient/Family Comments/goals: "this thing keeps bleeding"  Pain/Comfort:  Pain Rating 1:  (pt did not rate)  Pain Addressed 1: Reposition, Distraction  Pain Rating Post-Intervention 1: other (see comments) (pt did not rate)      Objective:     Communicated with nurse prior to session.  Patient found HOB elevated with colostomy, PureWick upon PT entry to room.     General Precautions: " Standard, fall  Orthopedic Precautions: N/A  Braces: N/A  Respiratory Status: Room air     Functional Mobility:  Bed Mobility:  Rolling Left:  minimum assistance  Scooting: stand by assistance  Supine to Sit: minimum assistance  Transfers:     Sit to Stand:  contact guard assistance with rolling walker  Gait: 25' with RW and CGA.   Pt amb with decreased jan and decreased step length.   Balance: Static/dynamic standing balance: Fair +, pt able to amb with RW and CGA.      AM-PAC 6 CLICK MOBILITY  Turning over in bed (including adjusting bedclothes, sheets and blankets)?: 3  Sitting down on and standing up from a chair with arms (e.g., wheelchair, bedside commode, etc.): 3  Moving from lying on back to sitting on the side of the bed?: 3  Moving to and from a bed to a chair (including a wheelchair)?: 3  Need to walk in hospital room?: 3  Climbing 3-5 steps with a railing?: 2  Basic Mobility Total Score: 17       Treatment & Education:  Educated pt on importance on continued mobility during hospital stay. Educated pt on performing B LE exercises in bed.      Patient left right sidelying with all lines intact and call button in reach..    GOALS:   Multidisciplinary Problems       Physical Therapy Goals          Problem: Physical Therapy    Goal Priority Disciplines Outcome Goal Variances Interventions   Physical Therapy Goal     PT, PT/OT Ongoing, Progressing     Description: Goals to be met by: 2/15/2023     Patient will increase functional independence with mobility by performin. Gait  x 250 feet with Supervision using LRAD.   2. Ascend/descend 4 stair with right Handrails Supervision using no Assistive device.   3. Sit to Stand with Supervision using LRAD                       Time Tracking:     PT Received On: 23  PT Start Time: 1341     PT Stop Time: 1424  PT Total Time (min): 43 min     Billable Minutes: Gait Training 18 and Therapeutic Activity 25    Treatment Type: Treatment  PT/PTA: PT     PTA  Visit Number: 2     02/16/2023

## 2023-02-16 NOTE — ASSESSMENT & PLAN NOTE
Intermittent difficulties with hypotension while inpatient. Unable to tolerate full HFrEF GDMT.   Continued hypotension while admitted and symptomatic with ambulation. Will continue to titrate.     - continue Losartan 12.5 mg daily and Metoprolol 12.5 mg daily  - Hold GDMT if SBP<95  - encourage PO intake

## 2023-02-16 NOTE — ASSESSMENT & PLAN NOTE
Cardiology consulted for management of ADHF, now signed off  Recent echo showed EF of 10%  Patient hypervolemic on presentation, appears more euvolemic today  Home GDMT: Toprol-XL 25, losartan 25 mg.  Initial CXR without new detrimental changes since prior, BNP 2600 on arrival  1x IV lasix on 2/14    Recommendations:   - Restart lasix 20 mg daily PO. Some concern for hypovolemia and if persistent will consider holding again and attempting QOD lasix. Consideration for IVF bolus depending on BP trends over next 24 hours  - anticipate discharge on pta PO lasix regimen  - replete Mag>2, K>4  - continue Toprol-XL 12.5 per cardiology recommendations with hold parameters  - losartan 12.5 mg qd given hypotension with hold parameters  - would avoid IV anti-hypertensive  - holding home spironolactone, unable to tolerate full HFrEF GDMT

## 2023-02-17 PROBLEM — N30.01 ACUTE CYSTITIS WITH HEMATURIA: Status: RESOLVED | Noted: 2023-01-01 | Resolved: 2023-01-01

## 2023-02-17 NOTE — ASSESSMENT & PLAN NOTE
Cardiology consulted for management of ADHF, now signed off  Recent echo showed EF of 10%  Patient hypervolemic on presentation, appears more euvolemic today  Home GDMT: Toprol-XL 25, losartan 25 mg.  Initial CXR without new detrimental changes since prior, BNP 2600 on arrival  1x IV lasix on 2/14    Recommendations:   - Patient is mostly unable to tolerate GDMT. Was previously recommended to visit with HTS. Will consult HTS while admitted for additional considerations and even inotropic support given severely reduced EF and persistent hypotension limiting QOL  - Restart lasix 20 mg daily PO.   - anticipate discharge on pta PO lasix regimen  - replete Mag>2, K>4  - continue Toprol-XL 12.5 per cardiology recommendations with hold parameters  - losartan 12.5 mg qd given hypotension with hold parameters  - would avoid IV anti-hypertensive  - holding home spironolactone, unable to tolerate full HFrEF GDMT

## 2023-02-17 NOTE — SUBJECTIVE & OBJECTIVE
Interval History: NAEON. Limited output via ostomy. PRN oxy 10 used 2x overnight. Mag replaced. CRP uptrending but afebrile. Completed abx for UTI.    Review of Systems   Constitutional:  Negative for chills and fever.   HENT:  Negative for congestion, rhinorrhea and sore throat.    Eyes:  Negative for pain and visual disturbance.   Respiratory:  Negative for cough, shortness of breath and wheezing.    Cardiovascular:  Negative for chest pain, palpitations and leg swelling.   Gastrointestinal:  Positive for abdominal pain (improving) and constipation. Negative for blood in stool, diarrhea, nausea and vomiting.   Endocrine: Negative for polydipsia.   Genitourinary:  Negative for difficulty urinating, dysuria and hematuria.   Neurological:  Negative for dizziness, syncope, weakness, light-headedness and headaches.   Psychiatric/Behavioral:  Negative for agitation and confusion.    Objective:     Vital Signs (Most Recent):  Temp: 97.7 °F (36.5 °C) (02/17/23 0426)  Pulse: 89 (02/17/23 0426)  Resp: 20 (02/17/23 0620)  BP: (!) 105/54 (02/17/23 0426)  SpO2: (!) 94 % (02/17/23 0426)   Vital Signs (24h Range):  Temp:  [97.4 °F (36.3 °C)-99.3 °F (37.4 °C)] 97.7 °F (36.5 °C)  Pulse:  [] 89  Resp:  [16-20] 20  SpO2:  [92 %-94 %] 94 %  BP: ()/(46-59) 105/54     Weight: 54.9 kg (121 lb)  Body mass index is 21.43 kg/m².    Intake/Output Summary (Last 24 hours) at 2/17/2023 0712  Last data filed at 2/17/2023 0600  Gross per 24 hour   Intake 960 ml   Output 750 ml   Net 210 ml      Physical Exam  Vitals and nursing note reviewed.   Constitutional:       General: She is not in acute distress.     Appearance: She is ill-appearing.   HENT:      Head: Normocephalic and atraumatic.      Right Ear: External ear normal.      Left Ear: External ear normal.      Nose: No congestion or rhinorrhea.   Eyes:      General:         Right eye: No discharge.         Left eye: No discharge.      Extraocular Movements: Extraocular  movements intact.      Pupils: Pupils are equal, round, and reactive to light.   Cardiovascular:      Rate and Rhythm: Normal rate and regular rhythm.      Heart sounds: Normal heart sounds. No murmur heard.  Pulmonary:      Effort: No respiratory distress.      Breath sounds: No wheezing or rales (bibasilar).   Abdominal:      General: There is no distension.      Comments: Colostomy, vertical/midline abdominal excision, bandage clean/dry   Musculoskeletal:         General: No tenderness.      Cervical back: Normal range of motion. No rigidity.      Right lower leg: No edema.      Left lower leg: No edema.   Skin:     Findings: Bruising present. No erythema.   Neurological:      General: No focal deficit present.      Mental Status: She is alert and oriented to person, place, and time.   Psychiatric:         Mood and Affect: Mood normal.         Behavior: Behavior normal.       Significant Labs: All pertinent labs within the past 24 hours have been reviewed.  CBC: No results for input(s): WBC, HGB, HCT, PLT in the last 48 hours.  CMP:   Recent Labs   Lab 02/16/23  0327 02/17/23  0246   * 130*   K 4.3 4.4   CL 87* 87*   CO2 34* 34*   GLU 85 103   BUN 14 14   CREATININE 0.6 0.7   CALCIUM 8.7 8.7   ANIONGAP 9 9       Significant Imaging: I have reviewed all pertinent imaging results/findings within the past 24 hours.

## 2023-02-17 NOTE — PROGRESS NOTES
Patient Name: Odette Hart  Date: 2/17/2023  Service: Colon and Rectal Surgery    Subjective:  NAEO. Reports surgical incision less painful todayTolerating small volume, LFD w/o N/v or belching/hiccups/heartburn. Passing soft/solid stool into bag with flatus, denies further episodes of rectal stool. Voiding freely with persistent dysuria. Denies fevers/chills/sweats, lightheadedness/dizziness, chest pain/shortness of breath, upper or lower extremity edema/pain.     Medications:    Current Facility-Administered Medications:     [COMPLETED] acetaminophen 1,000 mg/100 mL (10 mg/mL) injection 1,000 mg, 1,000 mg, Intravenous, Q8H, Stopped at 02/07/23 0527 **FOLLOWED BY** acetaminophen tablet 1,000 mg, 1,000 mg, Oral, Q8H, Pablo Ramirez MD, 1,000 mg at 02/16/23 2116    albuterol inhaler 2 puff, 2 puff, Inhalation, Q6H PRN, Pablo Ramirez MD    ALPRAZolam tablet 0.5 mg, 0.5 mg, Oral, Nightly PRN, Rafa Cardozo MD, 0.5 mg at 02/16/23 2116    aspirin EC tablet 81 mg, 81 mg, Oral, Daily, Pablo Ramirez MD, 81 mg at 02/17/23 0844    balsam peru-castor oiL Oint, , Topical (Top), BID, Pablo Ramirez MD, Given at 02/17/23 0846    clopidogreL tablet 75 mg, 75 mg, Oral, Daily, Pablo Ramirez MD, 75 mg at 02/17/23 0845    dextrose 10% bolus 125 mL 125 mL, 12.5 g, Intravenous, PRN, Pablo Ramirez MD    dextrose 10% bolus 250 mL 250 mL, 25 g, Intravenous, PRN, Pablo Ramirez MD    enoxaparin injection 40 mg, 40 mg, Subcutaneous, Daily, Pablo Ramirez MD, 40 mg at 02/16/23 1704    ezetimibe tablet 10 mg, 10 mg, Oral, Daily, Pablo Ramirez MD, 10 mg at 02/17/23 0845    famotidine tablet 20 mg, 20 mg, Oral, BID, Rafa Cardozo MD    fluticasone propionate 50 mcg/actuation nasal spray 50 mcg, 1 spray, Each Nostril, Daily PRN, Pablo Ramirez MD    furosemide tablet 20 mg, 20 mg, Oral, Daily, Keanu Lagos DO, 20 mg at 02/17/23 0924    losartan split tablet 12.5 mg, 12.5 mg, Oral, Daily,  Keanu Lagos DO, 12.5 mg at 02/17/23 0924    metoprolol succinate (TOPROL-XL) 24 hr split tablet 12.5 mg, 12.5 mg, Oral, Daily, Pablo Ramirez MD, 12.5 mg at 02/17/23 0924    naloxone 0.4 mg/mL injection 0.02 mg, 0.02 mg, Intravenous, PRN, Pablo Ramirez MD    nitroGLYCERIN SL tablet 0.4 mg, 0.4 mg, Sublingual, Q5 Min PRN, Pablo Ramirez MD    nystatin 100,000 unit/mL suspension 500,000 Units, 500,000 Units, Oral, QID, Fahad Smith MD, 500,000 Units at 02/17/23 1400    ondansetron injection 4 mg, 4 mg, Intravenous, Q6H PRN, Pablo Ramirez MD, 4 mg at 02/16/23 1356    oxyCODONE immediate release tablet 5 mg, 5 mg, Oral, Q4H PRN, Keanu Lagos DO, 5 mg at 02/16/23 0846    oxyCODONE immediate release tablet Tab 10 mg, 10 mg, Oral, Q4H PRN, Keanu Lagos DO, 10 mg at 02/17/23 1417    polyethylene glycol packet 17 g, 17 g, Oral, Daily, Rafa Cardozo MD, 17 g at 02/17/23 0843    prochlorperazine injection Soln 5 mg, 5 mg, Intravenous, Q6H PRN, Pablo Ramirez MD, 5 mg at 02/05/23 1444    sodium chloride 0.9% flush 10 mL, 10 mL, Intravenous, PRN, Pablo Ramirez MD    sodium chloride 0.9% flush 10 mL, 10 mL, Intra-Catheter, PRN, Pablo Ramirez MD    Vital Signs:  Vitals:    02/17/23 1417   BP:    Pulse:    Resp: 16   Temp:        In/Out:  Intake/Output - Last 3 Shifts         02/15 0700  02/16 0659 02/16 0700  02/17 0659 02/17 0700  02/18 0659    P.O.  960     I.V. (mL/kg)   246.4 (4.5)    Total Intake(mL/kg)  960 (17.5) 246.4 (4.5)    Urine (mL/kg/hr) 200 (0.2) 450 (0.3)     Stool  300     Total Output 200 750     Net -200 +210 +246.4           Urine Occurrence  2 x     Stool Occurrence  0 x             Physical Exam:  General: Alert, oriented, in no apparent distress  HEENT: Sclera anicteric, trachea midline  Lungs: Normal respiratory rate and effort on room air  Abdomen: Soft, minimal renata-incisional TTP, nondistended. Stable/slightly improved inferior renata-incisional blanching  erythema without tenderness, persistent serous>serosanguinous drainage from incision following sterile probing yesterday. LUQ end colostomy pink and well perfused with soft stool and flatus in bag.  Extremities: Warm, well perfused, no edema  Neuro: Grossly intact, moves all extremities  Psych: Appropriate affect    Laboratory Studies:  Complete Blood Count:  Lab Results   Component Value Date/Time    WBC 5.99 02/15/2023 02:21 AM    HGB 10.7 (L) 02/15/2023 02:21 AM    HCT 36.1 (L) 02/15/2023 02:21 AM    HCT 46 05/06/2022 09:55 PM    RBC 4.60 02/15/2023 02:21 AM     02/15/2023 02:21 AM       Basic Chemistry Panel:  Lab Results   Component Value Date/Time     (L) 02/17/2023 02:46 AM    K 4.4 02/17/2023 02:46 AM    CL 87 (L) 02/17/2023 02:46 AM    CO2 34 (H) 02/17/2023 02:46 AM    BUN 14 02/17/2023 02:46 AM    CREATININE 0.7 02/17/2023 02:46 AM    CALCIUM 8.7 02/17/2023 02:46 AM       Hepatic Panel:  Lab Results   Component Value Date/Time    AST 36 02/06/2023 01:15 PM    ALT 23 02/06/2023 01:15 PM    ALKPHOS 116 02/06/2023 01:15 PM    BILITOT 1.4 (H) 02/06/2023 01:15 PM    ALBUMIN 1.7 (L) 02/06/2023 01:15 PM       Coagulation Panel:  Lab Results   Component Value Date/Time    INR 1.2 07/21/2022 04:28 AM       Imaging Studies:  XR Abd (2/8/23):  There is a right-sided ureteral stent identified.  No significant intra-abdominal calcifications noted.  No significant localized bowel dilatation.  Rounded soft tissue density in the left paraspinal area probably related to the previous surgery.    Assessment:  Odette Hart is a 67 y.o. year old female with history of HTN, CAD s/p PCI c/b HFrEF (EF 10%), MDD, debility, severe malnutrition, and open cholecystectomy and takedown of colovesical and colovaginal fistuli (two separate areas) with sigmoid colectomy, partial cecectomy and appendectomy, drainage of intra-abdominal abscess and end colostomy 5/9/2022 recently admitted 12/22/22 - 12/30/22 for new  colocutaneous fistula admitted 1/32/23 with incarcerated colonic prolapse from her fistula s/p manual reduction now s/p open transverse/descending colectomy with takedown of colocutaneous fistula and end colostomy + parastomal hernia repair + lysis of adhesions >60 minutes (Juliane) + debridement of skin/subcutaneos fat/fascia/muscle (Daniel) 2/7/23, postoperative course complicated by initial ileus since resolved.     Plan:  - Cont LRD as tolerated, HLIV  - Cont multimodal oral analgesia for postoperative pain to minimize narcotics   - cefpodoxime completed for dirty UA with dysuria, Ucx with multiple isolated but no dominant organisms  - [facility] medicine recs for management of multiple comorbidities, PO Lasix resumed 2/14/23  - ASA/Plavix, LVX/SCD, ambulation/IS PPX  - PT/OT for postoperative mobilization/ambulation  - WOCN c/s for fresh end colostomy care and repeat teaching  - Social work assisting in arranging home health upon discharge  - Dispo: ongoing, [facility]  - BOOST QID    EZEKIEL Johnston  Colon and Rectal Surgery

## 2023-02-17 NOTE — ASSESSMENT & PLAN NOTE
Patient with new onset dysuria and urine changes. Previous history of UTI with resistance on culture including VRE, enterobacter, klebsiella and pseudomonas.     Plan  - resolved  - cefpodoxime completed  - if hypotension worsens or patient febrile would recommend repeat blood cultures, lactic acid and would broaden to meropenem given previous resistance to cefepime and zosyn

## 2023-02-17 NOTE — PT/OT/SLP PROGRESS
Occupational Therapy      Patient Name:  Odette Hart   MRN:  [id number]    Patient not seen today secondary to patient declined due to pain and adamantly declined OOB activity, ADLs, or exercises even from bed level  . Will follow-up for therapy as scheduled.    2/17/2023

## 2023-02-17 NOTE — ASSESSMENT & PLAN NOTE
S/p colocutaneous fistula revision with intra-op R ureteral stent placement for hydronephrosis. Developed post-op ileus.   Required post-op PCA pump now discontinued and on PRN opioids    - oxy frequency to Q6 PRN and continue bowel regimen  - Continue Wound care

## 2023-02-17 NOTE — PROGRESS NOTES
[facility] Medicine  Progress Note    Patient Name: Odette Hart  MRN: [id number]  Patient Class: IP- Inpatient   Admission Date: 1/31/2023  Length of Stay: 17 days  Attending Physician: Rafa Cardozo MD  Primary Care Provider: Braxton Barragan MD        Subjective:     Principal Problem:Colocutaneous fistula        HPI:  66 yo F with HTN, HLD (statin intolerance), DM, CAD s/p PCI to LAD (2012)/LCX (1/2022), HFrEF (EF10%), debility, severe malnutrition, and the following procedures performed 5/2022: cholecystectomy, takedown of colovesical and colovaginal fistula, sigmoid colectomy, partial cecectomy, end colostomy who presents for colocutaneous fistula repair with abdominal wall reconstruction. On admission she was found to be hyponatremic and in acute on chronic decompensated heart failure. Cardiology was consulted to manage her HF and have since signed off as her volume status has become more euvolemic following diuresis. Medicine was consulted for pr-eop evaluation and continued management of her hyponatremia. Per chart review patient is chronically, mildly hyponatremic at baseline. She takes Lasix, Toprol, spironolactone, and losartan for her HFrEF. She reports correct use of her home medications. She states her diet consists of peanut butter and crackers. Denies CP/SOB/nausea vomiting, cough fever chills, polydipsia. Her Na has been improving with diuresis. Patient has multiple risk factors that complicate surgery: acute on chronic decompensated HF, CAD with PCIx2, hypertension, hyperlipidemia, malnutrition. This being said she does not have reported/documented/observed dysfunction of liver, kidney, lung, clotting.    On examination, she is chronically ill appearing/in no acute distress/at mental baseline. AF, /58 (78), sat 98% on RA, no leukocytosis, hgb 11.8, Hct 38.5, Plts 237, moderate Hyponatremia Na 124 (initially 122 on admission) with labile Baseline, K 3.1, Cl 85, Bun 13,  Cr 00.8, Ca 7.9, albumin 2-3. BNP 2600, UOP 2300 on lasix 80mg    CXR with mild pulmonary congestion      Overview/Hospital Course:  Admitted for planned colocutaneous fistula repair, found to be hyponatremic and in ADHF. Started on IV diuresis, >6 L UOP, returned to euvolemia. Urine/serum studies consistent with hypervolemic hyponatremia most likely in 2/2 to acute on chronic HF. She does have multiple factors that maker her high risk for surgery, primarily her severrely reduced LVEF 10%. Cardiology and medicine consulted for pre-operative evaluation. Agree she is high risk, but need of her procedure outweigh the risks. 2/6 Patient tolerated colocutaneous takedown/reconstruction well. Her Hyponatremia has resolved with Lasix 80mg BID. S/p colocutaneous fistula revision with intra-op R ureteral stent placement for hydronephrosis. Developed post-op ileus. Given lasix holiday for over diuresis. Plavix, ASA restarted post-op. Anticipate discharge on prior home PO lasix dosing.     Complaint of dysuria and dark cloudy urine noted by primary team, patient started on cipro then transitioned to cefpodoxime given culture results of multiple organisms. Recently discontinued from PCA pump with PRN pain management of oxy 5 and 10 mg. Restarted lasix 20 mg on 2/14/23.       Interval History: NAEON. Limited output via ostomy. PRN oxy 10 used 2x overnight. Mag replaced. CRP uptrending but afebrile. Completed abx for UTI.    Review of Systems   Constitutional:  Negative for chills and fever.   HENT:  Negative for congestion, rhinorrhea and sore throat.    Eyes:  Negative for pain and visual disturbance.   Respiratory:  Negative for cough, shortness of breath and wheezing.    Cardiovascular:  Negative for chest pain, palpitations and leg swelling.   Gastrointestinal:  Positive for abdominal pain (improving) and constipation. Negative for blood in stool, diarrhea, nausea and vomiting.   Endocrine: Negative for polydipsia.    Genitourinary:  Negative for difficulty urinating, dysuria and hematuria.   Neurological:  Negative for dizziness, syncope, weakness, light-headedness and headaches.   Psychiatric/Behavioral:  Negative for agitation and confusion.    Objective:     Vital Signs (Most Recent):  Temp: 97.7 °F (36.5 °C) (02/17/23 0426)  Pulse: 89 (02/17/23 0426)  Resp: 20 (02/17/23 0620)  BP: (!) 105/54 (02/17/23 0426)  SpO2: (!) 94 % (02/17/23 0426)   Vital Signs (24h Range):  Temp:  [97.4 °F (36.3 °C)-99.3 °F (37.4 °C)] 97.7 °F (36.5 °C)  Pulse:  [] 89  Resp:  [16-20] 20  SpO2:  [92 %-94 %] 94 %  BP: ()/(46-59) 105/54     Weight: 54.9 kg (121 lb)  Body mass index is 21.43 kg/m².    Intake/Output Summary (Last 24 hours) at 2/17/2023 0712  Last data filed at 2/17/2023 0600  Gross per 24 hour   Intake 960 ml   Output 750 ml   Net 210 ml      Physical Exam  Vitals and nursing note reviewed.   Constitutional:       General: She is not in acute distress.     Appearance: She is ill-appearing.   HENT:      Head: Normocephalic and atraumatic.      Right Ear: External ear normal.      Left Ear: External ear normal.      Nose: No congestion or rhinorrhea.   Eyes:      General:         Right eye: No discharge.         Left eye: No discharge.      Extraocular Movements: Extraocular movements intact.      Pupils: Pupils are equal, round, and reactive to light.   Cardiovascular:      Rate and Rhythm: Normal rate and regular rhythm.      Heart sounds: Normal heart sounds. No murmur heard.  Pulmonary:      Effort: No respiratory distress.      Breath sounds: No wheezing or rales (bibasilar).   Abdominal:      General: There is no distension.      Comments: Colostomy, vertical/midline abdominal excision, bandage clean/dry   Musculoskeletal:         General: No tenderness.      Cervical back: Normal range of motion. No rigidity.      Right lower leg: No edema.      Left lower leg: No edema.   Skin:     Findings: Bruising present. No  erythema.   Neurological:      General: No focal deficit present.      Mental Status: She is alert and oriented to person, place, and time.   Psychiatric:         Mood and Affect: Mood normal.         Behavior: Behavior normal.       Significant Labs: All pertinent labs within the past 24 hours have been reviewed.  CBC: No results for input(s): WBC, HGB, HCT, PLT in the last 48 hours.  CMP:   Recent Labs   Lab 02/16/23  0327 02/17/23  0246   * 130*   K 4.3 4.4   CL 87* 87*   CO2 34* 34*   GLU 85 103   BUN 14 14   CREATININE 0.6 0.7   CALCIUM 8.7 8.7   ANIONGAP 9 9       Significant Imaging: I have reviewed all pertinent imaging results/findings within the past 24 hours.      Assessment/Plan:      * Colocutaneous fistula  S/p colocutaneous fistula revision with intra-op R ureteral stent placement for hydronephrosis. Developed post-op ileus.   Required post-op PCA pump now discontinued and on PRN opioids    - oxy frequency to Q6 PRN and continue bowel regimen  - Continue Wound care    Ileus  Patient has Post-operative ileus which is adynamic in etiology which is improving. This is inherent to her procedure/medications. Will treat conservatively with bowel rest, IV fluids, serial abdominal exams and avoidance of GI paralytics such as narcotics or anti-spasmodics. Monitor patient closely.     Diet advanced 2/12    -continue management per primary CRS    Hyponatremia  Chronic mild hyponatremia on chart review, now moderate on presentation  Found to be in ADHF on arrival  BNP 2600, volume overloaded, JVD present, bilateral LE edema on consultation  CXR with mild pulmonary congestion  Reports poor diet at home (crackers/peanutbutter)  Reduced albumin likely 2/2 to malnutrition    Previously due to ADHF. Resolved but concern for secondary cause of hypovolemic hyponatremia as patient has had poor PO intake 2/2 abdominal pain    - consider trial of IVF if worsening on labs on 2/17  - strict I/Os  - daily CMP  - replace  electrolytes      Hypertension  Intermittent difficulties with hypotension while inpatient. Unable to tolerate full HFrEF GDMT.   Continued hypotension while admitted and symptomatic with ambulation. Will continue to titrate.     - continue Losartan 12.5 mg daily and Metoprolol 12.5 mg daily  - Hold GDMT if SBP<95  - encourage PO intake     Hypercholesterolemia  Lipids well controlled  Has documented statin intolerance    - continue ezetimibe    Coronary artery disease  History of Cad with PCIx2 LAD in 2012 and LCX 1/2022  Home regimen: DAPT    No further surgical intervention planned inpatient by CRS or urology.     - continue aspirin  - continue clopidogrel    Ischemic cardiomyopathy  Cardiology consulted for management of ADHF, now signed off  Recent echo showed EF of 10%  Patient hypervolemic on presentation, appears more euvolemic today  Home GDMT: Toprol-XL 25, losartan 25 mg.  Initial CXR without new detrimental changes since prior, BNP 2600 on arrival  1x IV lasix on 2/14    Recommendations:   - Patient is mostly unable to tolerate GDMT. Was previously recommended to visit with HTS. Will consult HTS while admitted for additional considerations and even inotropic support given severely reduced EF and persistent hypotension limiting QOL  - Restart lasix 20 mg daily PO.   - anticipate discharge on pta PO lasix regimen  - replete Mag>2, K>4  - continue Toprol-XL 12.5 per cardiology recommendations with hold parameters  - losartan 12.5 mg qd given hypotension with hold parameters  - would avoid IV anti-hypertensive  - holding home spironolactone, unable to tolerate full HFrEF GDMT      VTE Risk Mitigation (From admission, onward)           Ordered     enoxaparin injection 40 mg  Daily         02/07/23 1659     Place sequential compression device  Until discontinued         02/06/23 1447     IP VTE HIGH RISK PATIENT  Once         02/06/23 1447                    Discharge Planning   SIVAN: 2/18/2023     Code Status:  Full Code   Is the patient medically ready for discharge?:     Reason for patient still in hospital (select all that apply): Patient trending condition  Discharge Plan A: Home Health   Discharge Delays: (!) Waiting for Provider to Speak to Patient              Ml Lagos DO  Department of Hospital Medicine   [facility]

## 2023-02-17 NOTE — PLAN OF CARE
END OF SHIFT NOTE  Pt rested well throughout shift, no distress at this time, mild complaints of pain, prn meds given x1, ML incision remain with scant bleeding, changed dsg x1. VSS, bed in lowest position, side rails up x2. Call light within reach, personal items within reach.     GARRY Lopez RN         Problem: Adult Inpatient Plan of Care  Goal: Plan of Care Review  Outcome: Ongoing, Progressing  Goal: Patient-Specific Goal (Individualized)  Outcome: Ongoing, Progressing  Goal: Absence of Hospital-Acquired Illness or Injury  Outcome: Ongoing, Progressing  Goal: Optimal Comfort and Wellbeing  Outcome: Ongoing, Progressing  Goal: Readiness for Transition of Care  Outcome: Ongoing, Progressing     Problem: Fall Injury Risk  Goal: Absence of Fall and Fall-Related Injury  Outcome: Ongoing, Progressing     Problem: Impaired Wound Healing  Goal: Optimal Wound Healing  Outcome: Ongoing, Progressing     Problem: Infection  Goal: Absence of Infection Signs and Symptoms  Outcome: Ongoing, Progressing     Problem: Skin Injury Risk Increased  Goal: Skin Health and Integrity  Outcome: Ongoing, Progressing

## 2023-02-17 NOTE — PROGRESS NOTES
Pt declined participating in any functional mobility, reports she just received medication and refuses to get OOB. Will follow up per POC.

## 2023-02-18 NOTE — PLAN OF CARE
1948 pt hypotensive, asymptomatic, paged MD on call, awaiting call back  1955 Spoke to MD Alberto, she instructed to hold all pain meds and benzo. Will continue to monitor patient  2058 rechecked pt bp, pt remain hypotensive, bp 78/48. Paged on call MD, awaiting call back.   2101 spoke to on call MD, she stated that she will look in pt chart. Will monitor         END OF SHIFT NOTE  Pt rested well throughout shift, no distress at this time, no complaints, hypotensive at beginning of shift, other  VSS, all VS now stable. Pain meds given x1, ostomy bag in place. Changes dsg to abd x1, with small drainage. bed in lowest position, side rails up x2. Bed alarm set, personal items within reach.       GARRY Lopez RN       Problem: Adult Inpatient Plan of Care  Goal: Plan of Care Review  Outcome: Ongoing, Progressing  Goal: Patient-Specific Goal (Individualized)  Outcome: Ongoing, Progressing  Goal: Absence of Hospital-Acquired Illness or Injury  Outcome: Ongoing, Progressing  Goal: Optimal Comfort and Wellbeing  Outcome: Ongoing, Progressing  Goal: Readiness for Transition of Care  Outcome: Ongoing, Progressing     Problem: Fall Injury Risk  Goal: Absence of Fall and Fall-Related Injury  Outcome: Ongoing, Progressing     Problem: Impaired Wound Healing  Goal: Optimal Wound Healing  Outcome: Ongoing, Progressing     Problem: Infection  Goal: Absence of Infection Signs and Symptoms  Outcome: Ongoing, Progressing     Problem: Skin Injury Risk Increased  Goal: Skin Health and Integrity  Outcome: Ongoing, Progressing

## 2023-02-18 NOTE — PLAN OF CARE
POC reviewed with patient who verbalized understanding. VSS on RA. AAOX4. Remains free of falls and injury. Frequent weight shift encouraged. Patient refused to get up to side of bed or chair    - ML w/2 areas with sanguinous drainage, gauze dressing changed x 3 during shift, WINNIE Hendrickson and Dr. Ramirez assessed bedside  - sacral spine wound  - LUQ ileostomy w/flatus and stool  - poor tolerance of diet, little appetite, denies nausea    Pain controlled with PRN medications per MAR. Educated on IS use. Patient denies chest pain & SOB. Patient refused SCDs. No acute events. No distress noted. Bed in lowest position, call light within reach, frequent rounds made for safety.     Problem: Adult Inpatient Plan of Care  Goal: Plan of Care Review  Outcome: Ongoing, Progressing  Goal: Patient-Specific Goal (Individualized)  Outcome: Ongoing, Progressing  Goal: Absence of Hospital-Acquired Illness or Injury  Outcome: Ongoing, Progressing  Goal: Optimal Comfort and Wellbeing  Outcome: Ongoing, Progressing     Problem: Fall Injury Risk  Goal: Absence of Fall and Fall-Related Injury  Outcome: Ongoing, Progressing     Problem: Infection  Goal: Absence of Infection Signs and Symptoms  Outcome: Ongoing, Progressing

## 2023-02-18 NOTE — SUBJECTIVE & OBJECTIVE
Interval History: RASHMI, remains euvolemic on maintenance lasix 20 PO daily, SBP remains too low to resume GDMT. CRP decreased to 57 (from 70), and BNP significantly decreased to 900s (from 2K+ range) which is likely her baseline.  Planning to discuss with HTS for possible inpt consult.    Review of Systems   Constitutional:  Negative for chills and fever.   HENT:  Negative for congestion, rhinorrhea and sore throat.    Eyes:  Negative for pain and visual disturbance.   Respiratory:  Negative for cough, shortness of breath and wheezing.    Cardiovascular:  Negative for chest pain, palpitations and leg swelling.   Gastrointestinal:  Positive for abdominal pain (improving) and constipation. Negative for blood in stool, diarrhea, nausea and vomiting.   Endocrine: Negative for polydipsia.   Genitourinary:  Negative for difficulty urinating, dysuria and hematuria.   Neurological:  Negative for dizziness, syncope, weakness, light-headedness and headaches.   Psychiatric/Behavioral:  Negative for agitation and confusion.  :   Objective:     Vital Signs (Most Recent):  Temp: 98.2 °F (36.8 °C) (02/18/23 0742)  Pulse: 85 (02/18/23 0849)  Resp: 18 (02/18/23 0838)  BP: (!) 124/58 (02/18/23 0849)  SpO2: 99 % (02/18/23 0838)   Vital Signs (24h Range):  Temp:  [97.1 °F (36.2 °C)-99 °F (37.2 °C)] 98.2 °F (36.8 °C)  Pulse:  [75-88] 85  Resp:  [16-20] 18  SpO2:  [93 %-99 %] 99 %  BP: ()/(44-58) 124/58     Weight: 54.9 kg (121 lb)  Body mass index is 21.43 kg/m².    Intake/Output Summary (Last 24 hours) at 2/18/2023 0820  Last data filed at 2/18/2023 0522  Gross per 24 hour   Intake 930 ml   Output 1225 ml   Net -295 ml      Physical Exam  Vitals and nursing note reviewed.   Constitutional:       General: She is not in acute distress.     Appearance: She is ill-appearing.   HENT:      Head: Normocephalic and atraumatic.      Right Ear: External ear normal.      Left Ear: External ear normal.      Nose: No congestion or  rhinorrhea.   Eyes:      General:         Right eye: No discharge.         Left eye: No discharge.      Extraocular Movements: Extraocular movements intact.      Pupils: Pupils are equal, round, and reactive to light.   Cardiovascular:      Rate and Rhythm: Normal rate and regular rhythm.      Heart sounds: Normal heart sounds. No murmur heard.  Pulmonary:      Effort: No respiratory distress.      Breath sounds: No wheezing or rales (bibasilar).   Abdominal:      General: There is no distension.      Comments: Colostomy, vertical/midline abdominal excision, bandage clean/dry   Musculoskeletal:         General: No tenderness.      Cervical back: Normal range of motion. No rigidity.      Right lower leg: No edema.      Left lower leg: No edema.   Skin:     Findings: Bruising present. No erythema.   Neurological:      General: No focal deficit present.      Mental Status: She is alert and oriented to person, place, and time.   Psychiatric:         Mood and Affect: Mood normal.         Behavior: Behavior normal.       Significant Labs: All pertinent labs within the past 24 hours have been reviewed.    Significant Imaging: I have reviewed all pertinent imaging results/findings within the past 24 hours.   IV intact

## 2023-02-18 NOTE — PLAN OF CARE
POC reviewed with patient who verbalized understanding. AAOX4. Remains free of falls and injury. Frequent weight shift encouraged. Patient up to chair.     -VSS on RA except low BP, Dr. Gleason notified and some of AM medications held, see previous note  - ML w/2 areas with sanguinous drainage, gauze dressing changed x 1 during shift  - sacral spine wound  - LUQ ileostomy w/flatus and stool  - poor tolerance of diet, little appetite, nausea controlled with PRN medications    Pain controlled with PRN medications per MAR. Educated on IS use. Patient denies chest pain & SOB. Patient refused SCDs. No acute events. No distress noted. Bed in lowest position, call light within reach, frequent rounds made for safety.     Problem: Adult Inpatient Plan of Care  Goal: Plan of Care Review  Outcome: Ongoing, Progressing  Goal: Patient-Specific Goal (Individualized)  Outcome: Ongoing, Progressing  Goal: Absence of Hospital-Acquired Illness or Injury  Outcome: Ongoing, Progressing  Goal: Optimal Comfort and Wellbeing  Outcome: Ongoing, Progressing     Problem: Fall Injury Risk  Goal: Absence of Fall and Fall-Related Injury  Outcome: Ongoing, Progressing     Problem: Infection  Goal: Absence of Infection Signs and Symptoms  Outcome: Ongoing, Progressing

## 2023-02-18 NOTE — PROGRESS NOTES
Patient Name: Odette Hart  Date: 2/18/2023  Service: Colon and Rectal Surgery    Subjective:  NAEON. Tolerating small volume, LFD w/o  belching/hiccups/heartburn. Nauseous this AM, given zofran. Passing soft/solid stool into bag with flatus, denies further episodes of rectal stool. Voiding freely with persistent dysuria. Denies fevers/chills/sweats, lightheadedness/dizziness, chest pain/shortness of breath, upper or lower extremity edema/pain. Iodiform packing changed this AM, will change again in PM.     Medications:    Current Facility-Administered Medications:     [COMPLETED] acetaminophen 1,000 mg/100 mL (10 mg/mL) injection 1,000 mg, 1,000 mg, Intravenous, Q8H, Stopped at 02/07/23 0527 **FOLLOWED BY** acetaminophen tablet 1,000 mg, 1,000 mg, Oral, Q8H, Pablo Ramirez MD, 1,000 mg at 02/16/23 2116    albuterol inhaler 2 puff, 2 puff, Inhalation, Q6H PRN, Pablo Ramirez MD    ALPRAZolam tablet 0.5 mg, 0.5 mg, Oral, Nightly PRN, Rafa Cardozo MD, 0.5 mg at 02/16/23 2116    aspirin EC tablet 81 mg, 81 mg, Oral, Daily, Pablo Ramirez MD, 81 mg at 02/17/23 0844    balsam peru-castor oiL Oint, , Topical (Top), BID, Pablo Ramirez MD, Given at 02/17/23 2055    clopidogreL tablet 75 mg, 75 mg, Oral, Daily, Pablo Ramirez MD, 75 mg at 02/17/23 0845    dextrose 10% bolus 125 mL 125 mL, 12.5 g, Intravenous, PRN, Pablo Ramirez MD    dextrose 10% bolus 250 mL 250 mL, 25 g, Intravenous, PRN, Pablo Ramirez MD    enoxaparin injection 40 mg, 40 mg, Subcutaneous, Daily, Pablo Ramirez MD, 40 mg at 02/17/23 1753    ezetimibe tablet 10 mg, 10 mg, Oral, Daily, Pablo Ramirez MD, 10 mg at 02/17/23 0845    famotidine tablet 20 mg, 20 mg, Oral, BID, Rafa Cardozo MD, 20 mg at 02/17/23 2055    fluticasone propionate 50 mcg/actuation nasal spray 50 mcg, 1 spray, Each Nostril, Daily PRN, Pablo Ramirez MD    furosemide tablet 20 mg, 20 mg, Oral, Daily, Keanu Lagos DO, 20 mg at  02/17/23 0924    losartan split tablet 12.5 mg, 12.5 mg, Oral, Daily, Keanu Lagos DO, 12.5 mg at 02/17/23 0924    magnesium sulfate 2g in water 50mL IVPB (premix), 2 g, Intravenous, Once, Adia Poon MD    metoprolol succinate (TOPROL-XL) 24 hr split tablet 12.5 mg, 12.5 mg, Oral, Daily, Pablo Ramirez MD, 12.5 mg at 02/17/23 0924    naloxone 0.4 mg/mL injection 0.02 mg, 0.02 mg, Intravenous, PRN, Pablo Ramirez MD    nitroGLYCERIN SL tablet 0.4 mg, 0.4 mg, Sublingual, Q5 Min PRN, Pablo Ramirez MD    nystatin 100,000 unit/mL suspension 500,000 Units, 500,000 Units, Oral, QID, Fahad Smith MD, 500,000 Units at 02/17/23 2056    ondansetron injection 4 mg, 4 mg, Intravenous, Q6H PRN, Pablo Ramirez MD, 4 mg at 02/16/23 1356    oxyCODONE immediate release tablet 5 mg, 5 mg, Oral, Q4H PRN, Keanu Lagos DO, 5 mg at 02/18/23 0038    oxyCODONE immediate release tablet Tab 10 mg, 10 mg, Oral, Q4H PRN, Keanu Lagos DO, 10 mg at 02/17/23 1417    polyethylene glycol packet 17 g, 17 g, Oral, Daily, Rafa Cardozo MD, 17 g at 02/17/23 0843    prochlorperazine injection Soln 5 mg, 5 mg, Intravenous, Q6H PRN, Pablo Ramirez MD, 5 mg at 02/05/23 1444    sodium chloride 0.9% flush 10 mL, 10 mL, Intravenous, PRN, Pablo Ramirez MD    sodium chloride 0.9% flush 10 mL, 10 mL, Intra-Catheter, PRN, Pablo Ramirez MD    Vital Signs:  Vitals:    02/18/23 0852   BP: (!) 118/58   Pulse: 83   Resp:    Temp:        In/Out:  Intake/Output - Last 3 Shifts         02/16 0700 02/17 0659 02/17 0700 02/18 0659 02/18 0700 02/19 0659    P.O. 960 880     I.V. (mL/kg)  246.4 (4.5)     Total Intake(mL/kg) 960 (17.5) 1126.4 (20.5)     Urine (mL/kg/hr) 450 (0.3) 1150 (0.9)     Stool 300 75     Total Output 750 1225     Net +210 -98.6            Urine Occurrence 2 x      Stool Occurrence 0 x              Physical Exam:  General: Alert, oriented, in no apparent distress  HEENT: Sclera anicteric, trachea  midline  Lungs: Normal respiratory rate and effort on room air  Abdomen: Soft, minimal renata-incisional TTP, nondistended. Stable/slightly improved inferior renata-incisional blanching erythema without tenderness, persistent serous>serosanguinous drainage from incision following sterile probing yesterday. LUQ end colostomy pink and well perfused with soft stool and flatus in bag.  Extremities: Warm, well perfused, no edema  Neuro: Grossly intact, moves all extremities  Psych: Appropriate affect    Laboratory Studies:  Complete Blood Count:  Lab Results   Component Value Date/Time    WBC 4.99 02/18/2023 05:57 AM    HGB 10.5 (L) 02/18/2023 05:57 AM    HCT 35.1 (L) 02/18/2023 05:57 AM    HCT 46 05/06/2022 09:55 PM    RBC 4.48 02/18/2023 05:57 AM     02/18/2023 05:57 AM       Basic Chemistry Panel:  Lab Results   Component Value Date/Time     (L) 02/18/2023 05:57 AM    K 4.2 02/18/2023 05:57 AM    CL 91 (L) 02/18/2023 05:57 AM    CO2 32 (H) 02/18/2023 05:57 AM    BUN 11 02/18/2023 05:57 AM    CREATININE 0.5 02/18/2023 05:57 AM    CALCIUM 8.8 02/18/2023 05:57 AM       Hepatic Panel:  Lab Results   Component Value Date/Time    AST 36 02/06/2023 01:15 PM    ALT 23 02/06/2023 01:15 PM    ALKPHOS 116 02/06/2023 01:15 PM    BILITOT 1.4 (H) 02/06/2023 01:15 PM    ALBUMIN 1.7 (L) 02/06/2023 01:15 PM       Coagulation Panel:  Lab Results   Component Value Date/Time    INR 1.2 07/21/2022 04:28 AM       Imaging Studies:  XR Abd (2/8/23):  There is a right-sided ureteral stent identified.  No significant intra-abdominal calcifications noted.  No significant localized bowel dilatation.  Rounded soft tissue density in the left paraspinal area probably related to the previous surgery.    Assessment:  Odette Hart is a 67 y.o. year old female with history of HTN, CAD s/p PCI c/b HFrEF (EF 10%), MDD, debility, severe malnutrition, and open cholecystectomy and takedown of colovesical and colovaginal fistuli (two separate  areas) with sigmoid colectomy, partial cecectomy and appendectomy, drainage of intra-abdominal abscess and end colostomy 5/9/2022 recently admitted 12/22/22 - 12/30/22 for new colocutaneous fistula admitted 1/32/23 with incarcerated colonic prolapse from her fistula s/p manual reduction now s/p open transverse/descending colectomy with takedown of colocutaneous fistula and end colostomy + parastomal hernia repair + lysis of adhesions >60 minutes (Juliane) + debridement of skin/subcutaneos fat/fascia/muscle (Daniel) 2/7/23, postoperative course complicated by initial ileus since resolved.     Plan:  - Cont LRD as tolerated, HLIV  - Cont multimodal oral analgesia for postoperative pain to minimize narcotics   - cefpodoxime completed for dirty UA with dysuria, Ucx with multiple isolated but no dominant organisms  - Appreciate medicine recs for management of multiple comorbidities, PO Lasix resumed 2/14/23  - ASA/Plavix, LVX/SCD, ambulation/IS PPX  - PT/OT for postoperative mobilization/ambulation  - WOCN c/s for fresh end colostomy care and repeat teaching  - Social work assisting in arranging home health upon discharge  - Dispo: ongoing, [facility]  - BOOST QID

## 2023-02-18 NOTE — PROGRESS NOTES
First set of vitals for shift BP 93/44, HR 75. Repeat prior to medication admin with /55, HR 77. BP low overnight.   MD on-call notified. Per Dr. Girard held losartan and metoprolol and administered furosemide.   Will continue to monitor vitals per order.

## 2023-02-18 NOTE — ASSESSMENT & PLAN NOTE
Cardiology consulted for management of ADHF, now signed off  Recent echo showed EF of 10%  Patient hypervolemic on presentation, appears more euvolemic today  Home GDMT: Toprol-XL 25, losartan 25 mg.  Initial CXR without new detrimental changes since prior, BNP 2600 on arrival  1x IV lasix on 2/14    Recommendations:   - Patient is mostly unable to tolerate GDMT. Was previously recommended to visit with HTS. Will consult HTS while admitted for additional considerations and even inotropic support given severely reduced EF and persistent hypotension limiting QOL  - continue lasix 20 mg daily PO (half of home dose 40)  - anticipate discharge on pta PO lasix regimen  - replete Mag>2, K>4  - continue Toprol-XL 12.5 per cardiology recommendations with hold parameters  - losartan 12.5 mg qd given hypotension with hold parameters  - would avoid IV anti-hypertensive  - holding home spironolactone, unable to tolerate full HFrEF GDMT

## 2023-02-18 NOTE — PLAN OF CARE
Problem: Adult Inpatient Plan of Care  Goal: Plan of Care Review  2/18/2023 0221 by Thalia Winn RN  Outcome: Ongoing, Progressing  2/17/2023 1948 by Thalia Winn RN  Outcome: Ongoing, Progressing  Goal: Patient-Specific Goal (Individualized)  2/18/2023 0221 by Thalia Winn RN  Outcome: Ongoing, Progressing  2/17/2023 1948 by Thalia Winn RN  Outcome: Ongoing, Progressing  Goal: Absence of Hospital-Acquired Illness or Injury  2/18/2023 0221 by Thalia Winn RN  Outcome: Ongoing, Progressing  2/17/2023 1948 by Thalia Winn RN  Outcome: Ongoing, Progressing  Goal: Optimal Comfort and Wellbeing  2/18/2023 0221 by Thalia Winn RN  Outcome: Ongoing, Progressing  2/17/2023 1948 by Thalia Winn RN  Outcome: Ongoing, Progressing  Goal: Readiness for Transition of Care  2/18/2023 0221 by Thalia Winn RN  Outcome: Ongoing, Progressing  2/17/2023 1948 by Thalia Winn RN  Outcome: Ongoing, Progressing     Problem: Fall Injury Risk  Goal: Absence of Fall and Fall-Related Injury  2/18/2023 0221 by Thalia Winn RN  Outcome: Ongoing, Progressing  2/17/2023 1948 by Thalia Winn RN  Outcome: Ongoing, Progressing     Problem: Impaired Wound Healing  Goal: Optimal Wound Healing  2/18/2023 0221 by Thalia Winn RN  Outcome: Ongoing, Progressing  2/17/2023 1948 by Thalia Winn RN  Outcome: Ongoing, Progressing     Problem: Infection  Goal: Absence of Infection Signs and Symptoms  2/18/2023 0221 by Thalia Winn RN  Outcome: Ongoing, Progressing  2/17/2023 1948 by Thalia Winn RN  Outcome: Ongoing, Progressing     Problem: Skin Injury Risk Increased  Goal: Skin Health and Integrity  2/18/2023 0221 by Thalia Winn RN  Outcome: Ongoing, Progressing  2/17/2023 1948 by Thalia Winn RN  Outcome: Ongoing, Progressing

## 2023-02-19 NOTE — PROGRESS NOTES
Patient Name: Odette Hart  Date: 2/19/2023  Service: Colon and Rectal Surgery    Subjective:  NAEON. Tolerating increased amounts of diet w belching but no hiccups/heartburn. Appearing better today clinically. D/C'd losartan as pressures have been soft. Passing soft/solid stool into bag with flatus, denies further episodes of rectal stool. Voiding freely with persistent dysuria. Denies fevers/chills/sweats, lightheadedness/dizziness, chest pain/shortness of breath, upper or lower extremity edema/pain. Iodiform packing changed BID yesterday, will repeat today. Simethicone added. Excellent ostomy output    Medications:    Current Facility-Administered Medications:     [COMPLETED] acetaminophen 1,000 mg/100 mL (10 mg/mL) injection 1,000 mg, 1,000 mg, Intravenous, Q8H, Stopped at 02/07/23 0527 **FOLLOWED BY** acetaminophen tablet 1,000 mg, 1,000 mg, Oral, Q8H, Pablo Ramirez MD, 1,000 mg at 02/16/23 2116    albuterol inhaler 2 puff, 2 puff, Inhalation, Q6H PRN, Pablo Ramirez MD    ALPRAZolam tablet 0.5 mg, 0.5 mg, Oral, Nightly PRN, Rafa Cardozo MD, 0.5 mg at 02/19/23 0155    aspirin EC tablet 81 mg, 81 mg, Oral, Daily, Pablo Ramirez MD, 81 mg at 02/18/23 1000    balsam peru-castor oiL Oint, , Topical (Top), BID, Pablo Ramirez MD, Given at 02/18/23 1000    clopidogreL tablet 75 mg, 75 mg, Oral, Daily, Pablo Ramirez MD, 75 mg at 02/18/23 1000    dextrose 10% bolus 125 mL 125 mL, 12.5 g, Intravenous, PRN, Pablo Ramirez MD    dextrose 10% bolus 250 mL 250 mL, 25 g, Intravenous, PRN, Pablo Ramirez MD    enoxaparin injection 40 mg, 40 mg, Subcutaneous, Daily, Pablo Ramirez MD, 40 mg at 02/18/23 1758    ezetimibe tablet 10 mg, 10 mg, Oral, Daily, Pablo aRmirez MD, 10 mg at 02/18/23 1000    famotidine tablet 20 mg, 20 mg, Oral, BID, Rafa Cardozo MD, 20 mg at 02/18/23 1000    fluticasone propionate 50 mcg/actuation nasal spray 50 mcg, 1 spray, Each Nostril, Daily PRN,  Pablo Ramirez MD    furosemide tablet 20 mg, 20 mg, Oral, Daily, Keanu Lagos DO, 20 mg at 02/18/23 1036    metoprolol succinate (TOPROL-XL) 24 hr split tablet 12.5 mg, 12.5 mg, Oral, Daily, Pablo Ramirez MD, 12.5 mg at 02/17/23 0924    naloxone 0.4 mg/mL injection 0.02 mg, 0.02 mg, Intravenous, PRN, Pablo Ramirez MD    nitroGLYCERIN SL tablet 0.4 mg, 0.4 mg, Sublingual, Q5 Min PRN, Pablo Ramirez MD    nystatin 100,000 unit/mL suspension 500,000 Units, 500,000 Units, Oral, QID, Fahad Smith MD, 500,000 Units at 02/17/23 2056    ondansetron injection 4 mg, 4 mg, Intravenous, Q6H PRN, Pablo Ramirez MD, 4 mg at 02/18/23 1015    oxyCODONE immediate release tablet 5 mg, 5 mg, Oral, Q4H PRN, Keanu Lagos DO, 5 mg at 02/18/23 0038    oxyCODONE immediate release tablet Tab 10 mg, 10 mg, Oral, Q4H PRN, Keanu Lagos DO, 10 mg at 02/18/23 1759    polyethylene glycol packet 17 g, 17 g, Oral, Daily, Rafa Cardozo MD, 17 g at 02/18/23 1000    prochlorperazine injection Soln 5 mg, 5 mg, Intravenous, Q6H PRN, Pablo Ramirez MD, 5 mg at 02/18/23 1144    simethicone 40 mg/0.6 mL drops 40 mg, 40 mg, Oral, QID PRN, Rafa Cardozo MD    simethicone chewable tablet 80 mg, 1 tablet, Oral, TID PRN, Giovany Gleason MD    sodium chloride 0.9% flush 10 mL, 10 mL, Intravenous, PRN, Pablo Ramirez MD    sodium chloride 0.9% flush 10 mL, 10 mL, Intra-Catheter, PRN, Pablo Ramirez MD    Vital Signs:  Vitals:    02/19/23 0800   BP: (!) 107/53   Pulse: 91   Resp:    Temp:        In/Out:  Intake/Output - Last 3 Shifts         02/17 0700 02/18 0659 02/18 0700 02/19 0659 02/19 0700 02/20 0659    P.O. 880 840     I.V. (mL/kg) 246.4 (4.5) 50 (0.9)     Total Intake(mL/kg) 1126.4 (20.5) 890 (16.2)     Urine (mL/kg/hr) 1150 (0.9) 1225 (0.9)     Stool 75 150     Total Output 1225 1375     Net -98.6 -485            Stool Occurrence  0 x             Physical Exam:  General: Alert, oriented, in no apparent  distress  HEENT: Sclera anicteric, trachea midline  Lungs: Normal respiratory rate and effort on room air  Abdomen: Soft, minimal renata-incisional TTP, nondistended. Stable/slightly improved inferior renata-incisional blanching erythema without tenderness, persistent serous>serosanguinous drainage from incision. LUQ end colostomy pink and well perfused with soft stool and flatus in bag.  Extremities: Warm, well perfused, no edema  Neuro: Grossly intact, moves all extremities  Psych: Appropriate affect    Laboratory Studies:  Complete Blood Count:  Lab Results   Component Value Date/Time    WBC 4.99 02/18/2023 05:57 AM    HGB 10.5 (L) 02/18/2023 05:57 AM    HCT 35.1 (L) 02/18/2023 05:57 AM    HCT 46 05/06/2022 09:55 PM    RBC 4.48 02/18/2023 05:57 AM     02/18/2023 05:57 AM       Basic Chemistry Panel:  Lab Results   Component Value Date/Time     (L) 02/18/2023 05:57 AM    K 4.2 02/18/2023 05:57 AM    CL 91 (L) 02/18/2023 05:57 AM    CO2 32 (H) 02/18/2023 05:57 AM    BUN 11 02/18/2023 05:57 AM    CREATININE 0.5 02/18/2023 05:57 AM    CALCIUM 8.8 02/18/2023 05:57 AM       Hepatic Panel:  Lab Results   Component Value Date/Time    AST 36 02/06/2023 01:15 PM    ALT 23 02/06/2023 01:15 PM    ALKPHOS 116 02/06/2023 01:15 PM    BILITOT 1.4 (H) 02/06/2023 01:15 PM    ALBUMIN 1.7 (L) 02/06/2023 01:15 PM       Coagulation Panel:  Lab Results   Component Value Date/Time    INR 1.2 07/21/2022 04:28 AM       Imaging Studies:  XR Abd (2/8/23):  There is a right-sided ureteral stent identified.  No significant intra-abdominal calcifications noted.  No significant localized bowel dilatation.  Rounded soft tissue density in the left paraspinal area probably related to the previous surgery.    Assessment:  Odette Hart is a 67 y.o. year old female with history of HTN, CAD s/p PCI c/b HFrEF (EF 10%), MDD, debility, severe malnutrition, and open cholecystectomy and takedown of colovesical and colovaginal fistuli (two  separate areas) with sigmoid colectomy, partial cecectomy and appendectomy, drainage of intra-abdominal abscess and end colostomy 5/9/2022 recently admitted 12/22/22 - 12/30/22 for new colocutaneous fistula admitted 1/32/23 with incarcerated colonic prolapse from her fistula s/p manual reduction now s/p open transverse/descending colectomy with takedown of colocutaneous fistula and end colostomy + parastomal hernia repair + lysis of adhesions >60 minutes (Juliane) + debridement of skin/subcutaneos fat/fascia/muscle (Daniel) 2/7/23, postoperative course complicated by initial ileus since resolved.     Plan:  - Cont regular diet as tolerated  - Cont multimodal oral analgesia for postoperative pain to minimize narcotics   - cefpodoxime completed for dirty UA with dysuria, Ucx with multiple isolated but no dominant organisms  - Appreciate medicine recs for management of multiple comorbidities, PO Lasix resumed 2/14/23  - ASA/Plavix, LVX/SCD, ambulation/IS PPX  - PT/OT for postoperative mobilization/ambulation  - WOCN c/s for fresh end colostomy care and repeat teaching  - Social work assisting in arranging home health upon discharge  - Dispo: ongoing, [facility]  - BOOST QID

## 2023-02-19 NOTE — PROGRESS NOTES
[facility] Medicine  Progress Note    Patient Name: Odtete Hart  MRN: [id number]  Patient Class: IP- Inpatient   Admission Date: 1/31/2023  Length of Stay: 18 days  Attending Physician: Rafa Cardozo MD  Primary Care Provider: Braxton Barragan MD        Subjective:     Principal Problem:Colocutaneous fistula        HPI:  68 yo F with HTN, HLD (statin intolerance), DM, CAD s/p PCI to LAD (2012)/LCX (1/2022), HFrEF (EF10%), debility, severe malnutrition, and the following procedures performed 5/2022: cholecystectomy, takedown of colovesical and colovaginal fistula, sigmoid colectomy, partial cecectomy, end colostomy who presents for colocutaneous fistula repair with abdominal wall reconstruction. On admission she was found to be hyponatremic and in acute on chronic decompensated heart failure. Cardiology was consulted to manage her HF and have since signed off as her volume status has become more euvolemic following diuresis. Medicine was consulted for pr-eop evaluation and continued management of her hyponatremia. Per chart review patient is chronically, mildly hyponatremic at baseline. She takes Lasix, Toprol, spironolactone, and losartan for her HFrEF. She reports correct use of her home medications. She states her diet consists of peanut butter and crackers. Denies CP/SOB/nausea vomiting, cough fever chills, polydipsia. Her Na has been improving with diuresis. Patient has multiple risk factors that complicate surgery: acute on chronic decompensated HF, CAD with PCIx2, hypertension, hyperlipidemia, malnutrition. This being said she does not have reported/documented/observed dysfunction of liver, kidney, lung, clotting.    On examination, she is chronically ill appearing/in no acute distress/at mental baseline. AF, /58 (78), sat 98% on RA, no leukocytosis, hgb 11.8, Hct 38.5, Plts 237, moderate Hyponatremia Na 124 (initially 122 on admission) with labile Baseline, K 3.1, Cl 85, Bun 13,  Cr 00.8, Ca 7.9, albumin 2-3. BNP 2600, UOP 2300 on lasix 80mg    CXR with mild pulmonary congestion      Overview/Hospital Course:  Admitted for planned colocutaneous fistula repair, found to be hyponatremic and in ADHF. Started on IV diuresis, >6 L UOP, returned to euvolemia. Urine/serum studies consistent with hypervolemic hyponatremia most likely in 2/2 to acute on chronic HF. She does have multiple factors that maker her high risk for surgery, primarily her severrely reduced LVEF 10%. Cardiology and medicine consulted for pre-operative evaluation. Agree she is high risk, but need of her procedure outweigh the risks. 2/6 Patient tolerated colocutaneous takedown/reconstruction well. Her Hyponatremia has resolved with Lasix 80mg BID. S/p colocutaneous fistula revision with intra-op R ureteral stent placement for hydronephrosis. Developed post-op ileus. Given lasix holiday for over diuresis. Plavix, ASA restarted post-op. Anticipate discharge on prior home PO lasix dosing.     Complaint of dysuria and dark cloudy urine noted by primary team, patient started on cipro then transitioned to cefpodoxime (broader coverage) given culture results of multiple organisms none in predominance (thus an unequivocal UCx).  Recently discontinued from PCA pump with PRN pain management of oxy 5 and 10 mg. Restarted lasix 20 mg on 2/14/23. CRS monitoring surgical site closely, some output of serous fluid, no signs of cellulitis.        Interval History: NAEON, remains euvolemic on maintenance lasix 20 PO daily, SBP remains too low to resume GDMT. CRP decreased to 57 (from 70), and BNP significantly decreased to 900s (from 2K+ range) which is likely her baseline.  Planning to discuss with HTS for possible inpt consult.    Review of Systems   Constitutional:  Negative for chills and fever.   HENT:  Negative for congestion, rhinorrhea and sore throat.    Eyes:  Negative for pain and visual disturbance.   Respiratory:  Negative for  cough, shortness of breath and wheezing.    Cardiovascular:  Negative for chest pain, palpitations and leg swelling.   Gastrointestinal:  Positive for abdominal pain (improving) and constipation. Negative for blood in stool, diarrhea, nausea and vomiting.   Endocrine: Negative for polydipsia.   Genitourinary:  Negative for difficulty urinating, dysuria and hematuria.   Neurological:  Negative for dizziness, syncope, weakness, light-headedness and headaches.   Psychiatric/Behavioral:  Negative for agitation and confusion.  :   Objective:     Vital Signs (Most Recent):  Temp: 98.2 °F (36.8 °C) (02/18/23 0742)  Pulse: 85 (02/18/23 0849)  Resp: 18 (02/18/23 0838)  BP: (!) 124/58 (02/18/23 0849)  SpO2: 99 % (02/18/23 0838)   Vital Signs (24h Range):  Temp:  [97.1 °F (36.2 °C)-99 °F (37.2 °C)] 98.2 °F (36.8 °C)  Pulse:  [75-88] 85  Resp:  [16-20] 18  SpO2:  [93 %-99 %] 99 %  BP: ()/(44-58) 124/58     Weight: 54.9 kg (121 lb)  Body mass index is 21.43 kg/m².    Intake/Output Summary (Last 24 hours) at 2/18/2023 0851  Last data filed at 2/18/2023 0522  Gross per 24 hour   Intake 930 ml   Output 1225 ml   Net -295 ml      Physical Exam  Vitals and nursing note reviewed.   Constitutional:       General: She is not in acute distress.     Appearance: She is ill-appearing.   HENT:      Head: Normocephalic and atraumatic.      Right Ear: External ear normal.      Left Ear: External ear normal.      Nose: No congestion or rhinorrhea.   Eyes:      General:         Right eye: No discharge.         Left eye: No discharge.      Extraocular Movements: Extraocular movements intact.      Pupils: Pupils are equal, round, and reactive to light.   Cardiovascular:      Rate and Rhythm: Normal rate and regular rhythm.      Heart sounds: Normal heart sounds. No murmur heard.  Pulmonary:      Effort: No respiratory distress.      Breath sounds: No wheezing or rales (bibasilar).   Abdominal:      General: There is no distension.       Comments: Colostomy, vertical/midline abdominal excision, bandage clean/dry   Musculoskeletal:         General: No tenderness.      Cervical back: Normal range of motion. No rigidity.      Right lower leg: No edema.      Left lower leg: No edema.   Skin:     Findings: Bruising present. No erythema.   Neurological:      General: No focal deficit present.      Mental Status: She is alert and oriented to person, place, and time.   Psychiatric:         Mood and Affect: Mood normal.         Behavior: Behavior normal.       Significant Labs: All pertinent labs within the past 24 hours have been reviewed.    Significant Imaging: I have reviewed all pertinent imaging results/findings within the past 24 hours.      Assessment/Plan:      * Colocutaneous fistula  S/p colocutaneous fistula revision with intra-op R ureteral stent placement for hydronephrosis. Developed post-op ileus.   Required post-op PCA pump now discontinued and on PRN opioids    - oxy Q4 PRN and continue bowel regimen  - Continue Wound care    Ischemic cardiomyopathy  Cardiology consulted for management of ADHF, now signed off  Recent echo showed EF of 10%  Patient hypervolemic on presentation, appears more euvolemic today  Home GDMT: Toprol-XL 25, losartan 25 mg.  Initial CXR without new detrimental changes since prior, BNP 2600 on arrival  1x IV lasix on 2/14    Recommendations:   - Patient is mostly unable to tolerate GDMT. Was previously recommended to visit with HTS. Will consult HTS while admitted for additional considerations and even inotropic support given severely reduced EF and persistent hypotension limiting QOL  - continue lasix 20 mg daily PO (half of home dose 40)  - anticipate discharge on pta PO lasix regimen  - replete Mag>2, K>4  - continue Toprol-XL 12.5 per cardiology recommendations with hold parameters  - losartan 12.5 mg qd given hypotension with hold parameters  - would avoid IV anti-hypertensive  - holding home spironolactone,  unable to tolerate full HFrEF GDMT    Ileus  Patient has Post-operative ileus which is adynamic in etiology which is improving. This is inherent to her procedure/medications. Will treat conservatively with bowel rest, IV fluids, serial abdominal exams and avoidance of GI paralytics such as narcotics or anti-spasmodics. Monitor patient closely.     Diet advanced 2/12    -continue management per primary CRS    Hyponatremia  Chronic mild hyponatremia on chart review, now moderate on presentation  Found to be in ADHF on arrival  BNP 2600, volume overloaded, JVD present, bilateral LE edema on consultation  CXR with mild pulmonary congestion  Reports poor diet at home (crackers/peanutbutter)  Reduced albumin likely 2/2 to malnutrition    Previously due to ADHF. Resolved but concern for secondary cause of hypovolemic hyponatremia as patient has had poor PO intake 2/2 abdominal pain    - consider trial of IVF if worsening on labs on 2/17  - strict I/Os  - daily CMP  - replace electrolytes      Hypertension  Intermittent difficulties with hypotension while inpatient. Unable to tolerate full HFrEF GDMT.   Continued hypotension while admitted and symptomatic with ambulation. Will continue to titrate.     - continue Losartan 12.5 mg daily and Metoprolol 12.5 mg daily  - Hold GDMT if SBP<95  - encourage PO intake     Hypercholesterolemia  Lipids well controlled  Has documented statin intolerance    - continue ezetimibe    Coronary artery disease  History of Cad with PCIx2 LAD in 2012 and LCX 1/2022  Home regimen: DAPT    No further surgical intervention planned inpatient by CRS or urology.     - continue aspirin  - continue clopidogrel      VTE Risk Mitigation (From admission, onward)         Ordered     enoxaparin injection 40 mg  Daily         02/07/23 1659     Place sequential compression device  Until discontinued         02/06/23 1447     IP VTE HIGH RISK PATIENT  Once         02/06/23 1447                Discharge Planning    SIVAN: 2/19/2023     Code Status: Full Code   Is the patient medically ready for discharge?:     Reason for patient still in hospital (select all that apply): Patient trending condition and Pending disposition  Discharge Plan A: Home Health   Discharge Delays: (!) Diet Not Ready for Discharge              Adia Poon MD  Department of Hospital Medicine   [address] [facility] [place]

## 2023-02-19 NOTE — PLAN OF CARE
POC reviewed with patient who verbalized understanding. AAOX4. Remains free of falls and injury. Frequent weight shift encouraged. Patient refused to get up to chair or ambulate despite encouragement and education    -VSS on RA   - ML w/island dressing, changed by MD bedside  - sacral spine wound  - LUQ ileostomy w/flatus and stool  - mild tolerance of diet, little appetite, nausea controlled with PRN medications    Pain controlled with PRN medications per MAR. Educated on IS use. Patient denies chest pain & SOB. Patient refused SCDs. No acute events. No distress noted. Bed in lowest position, call light within reach, frequent rounds made for safety.     Problem: Adult Inpatient Plan of Care  Goal: Plan of Care Review  Outcome: Ongoing, Progressing  Goal: Patient-Specific Goal (Individualized)  Outcome: Ongoing, Progressing  Goal: Absence of Hospital-Acquired Illness or Injury  Outcome: Ongoing, Progressing  Goal: Optimal Comfort and Wellbeing  Outcome: Ongoing, Progressing     Problem: Fall Injury Risk  Goal: Absence of Fall and Fall-Related Injury  Outcome: Ongoing, Progressing     Problem: Infection  Goal: Absence of Infection Signs and Symptoms  Outcome: Ongoing, Progressing

## 2023-02-19 NOTE — NURSING
Pt refusing all medications at this time. SBP soft 86/50 with map of 64, spoke with on call provider Giovany. He stated that soft pressure OK as long as PT is asymptomatic and MAP >60. Will continue to monitor, and check pressures Q2

## 2023-02-19 NOTE — ASSESSMENT & PLAN NOTE
S/p colocutaneous fistula revision with intra-op R ureteral stent placement for hydronephrosis. Developed post-op ileus.   Required post-op PCA pump now discontinued and on PRN opioids    - oxy Q4 PRN and continue bowel regimen  - Continue Wound care

## 2023-02-20 NOTE — ASSESSMENT & PLAN NOTE
Cardiology consulted for management of ADHF, now signed off  Recent echo showed EF of 10%  Patient hypervolemic on presentation, appears more euvolemic today  Home GDMT: Toprol-XL 25, losartan 25 mg.  Initial CXR without new detrimental changes since prior, BNP 2600 on arrival  1x IV lasix on 2/14    Recommendations:   - Patient is mostly unable to tolerate GDMT. Was previously recommended to visit with HTS. Will consult HTS while admitted for additional considerations and even inotropic support given severely reduced EF and persistent hypotension limiting QOL  - continue lasix 20 mg daily PO (half of home dose 40)  - undecided if we will d/c on lasix 20 or 40 daily, will definitely need lasix sliding scale   - replete Mag>2, K>4  - Toprol-XL 12.5 per cardiology recommendations with hold parameters  - holding losartan 12.5 mg qd given hypotension with hold parameters  - avoid IV anti-hypertensive  - holding home spironolactone, unable to tolerate full HFrEF GDMT

## 2023-02-20 NOTE — PROGRESS NOTES
[facility] Medicine  Progress Note    Patient Name: Odette Hart  MRN: [id number]  Patient Class: IP- Inpatient   Admission Date: 1/31/2023  Length of Stay: 19 days  Attending Physician: Rafa Cardozo MD  Primary Care Provider: Braxton Barragan MD        Subjective:     Principal Problem:Colocutaneous fistula        HPI:  66 yo F with HTN, HLD (statin intolerance), DM, CAD s/p PCI to LAD (2012)/LCX (1/2022), HFrEF (EF10%), debility, severe malnutrition, and the following procedures performed 5/2022: cholecystectomy, takedown of colovesical and colovaginal fistula, sigmoid colectomy, partial cecectomy, end colostomy who presents for colocutaneous fistula repair with abdominal wall reconstruction. On admission she was found to be hyponatremic and in acute on chronic decompensated heart failure. Cardiology was consulted to manage her HF and have since signed off as her volume status has become more euvolemic following diuresis. Medicine was consulted for pr-eop evaluation and continued management of her hyponatremia. Per chart review patient is chronically, mildly hyponatremic at baseline. She takes Lasix, Toprol, spironolactone, and losartan for her HFrEF. She reports correct use of her home medications. She states her diet consists of peanut butter and crackers. Denies CP/SOB/nausea vomiting, cough fever chills, polydipsia. Her Na has been improving with diuresis. Patient has multiple risk factors that complicate surgery: acute on chronic decompensated HF, CAD with PCIx2, hypertension, hyperlipidemia, malnutrition. This being said she does not have reported/documented/observed dysfunction of liver, kidney, lung, clotting.    On examination, she is chronically ill appearing/in no acute distress/at mental baseline. AF, /58 (78), sat 98% on RA, no leukocytosis, hgb 11.8, Hct 38.5, Plts 237, moderate Hyponatremia Na 124 (initially 122 on admission) with labile Baseline, K 3.1, Cl 85, Bun 13,  Cr 00.8, Ca 7.9, albumin 2-3. BNP 2600, UOP 2300 on lasix 80mg    CXR with mild pulmonary congestion      Overview/Hospital Course:  Admitted for planned colocutaneous fistula repair, found to be hyponatremic and in ADHF. Started on IV diuresis, >6 L UOP, returned to euvolemia. Urine/serum studies consistent with hypervolemic hyponatremia most likely in 2/2 to acute on chronic HF. She does have multiple factors that maker her high risk for surgery, primarily her severrely reduced LVEF 10%. Cardiology and medicine consulted for pre-operative evaluation. Agree she is high risk, but need of her procedure outweigh the risks. 2/6 Patient tolerated colocutaneous takedown/reconstruction well. Her Hyponatremia has resolved with Lasix 80mg BID. S/p colocutaneous fistula revision with intra-op R ureteral stent placement for hydronephrosis. Developed post-op ileus. Given lasix holiday for over diuresis. Plavix, ASA restarted post-op. Anticipate discharge on prior home PO lasix dosing.     Complaint of dysuria and dark cloudy urine noted by primary team, patient started on cipro then transitioned to cefpodoxime (broader coverage) given culture results of multiple organisms none in predominance (thus an unequivocal UCx).  Recently discontinued from PCA pump with PRN pain management of oxy 5 and 10 mg. Restarted lasix 20 mg on 2/14/23. CRS monitoring surgical site closely, some output of serous fluid, no signs of cellulitis.        Interval History: RASHMI, CRS doing BID packing changes, still with output from wound but pt states it's improving.  remains euvolemic on maintenance lasix 20 PO daily, metop XL 12.5 qd; SBP remains too low for full GDMT. Planning to discuss with HTS for possible inpt consult    Review of Systems as above  Objective:     Vital Signs (Most Recent):  Temp: 98.1 °F (36.7 °C) (02/19/23 1558)  Pulse: 87 (02/19/23 1627)  Resp: 18 (02/19/23 1636)  BP: (!) 101/54 (02/19/23 1627)  SpO2: 98 % (02/19/23 1558)    Vital Signs (24h Range):  Temp:  [97.6 °F (36.4 °C)-98.1 °F (36.7 °C)] 98.1 °F (36.7 °C)  Pulse:  [75-91] 87  Resp:  [16-20] 18  SpO2:  [93 %-98 %] 98 %  BP: ()/(48-56) 101/54     Weight: 54.9 kg (121 lb)  Body mass index is 21.43 kg/m².    Intake/Output Summary (Last 24 hours) at 2/19/2023 1833  Last data filed at 2/19/2023 1357  Gross per 24 hour   Intake 580 ml   Output 775 ml   Net -195 ml      Physical Exam lungs clear, no JVD, no LE edema    Significant Labs: All pertinent labs within the past 24 hours have been reviewed.    Significant Imaging: I have reviewed all pertinent imaging results/findings within the past 24 hours.      Assessment/Plan:      * Colocutaneous fistula  S/p colocutaneous fistula revision with intra-op R ureteral stent placement for hydronephrosis. Developed post-op ileus.   Required post-op PCA pump now discontinued and on PRN opioids    - oxy Q4 PRN and continue bowel regimen  - Continue Wound care  - per primary     Ischemic cardiomyopathy  Cardiology consulted for management of ADHF, now signed off  Recent echo showed EF of 10%  Patient hypervolemic on presentation, appears more euvolemic today  Home GDMT: Toprol-XL 25, losartan 25 mg.  Initial CXR without new detrimental changes since prior, BNP 2600 on arrival  1x IV lasix on 2/14    Recommendations:   - Patient is mostly unable to tolerate GDMT. Was previously recommended to visit with HTS. Will consult HTS while admitted for additional considerations and even inotropic support given severely reduced EF and persistent hypotension limiting QOL  - continue lasix 20 mg daily PO (half of home dose 40)  - undecided if we will d/c on lasix 20 or 40 daily, will definitely need lasix sliding scale   - replete Mag>2, K>4  - Toprol-XL 12.5 per cardiology recommendations with hold parameters  - holding losartan 12.5 mg qd given hypotension with hold parameters  - avoid IV anti-hypertensive  - holding home spironolactone, unable to  tolerate full HFrEF GDMT    Ileus  Patient has Post-operative ileus which is adynamic in etiology which is improving. This is inherent to her procedure/medications. Will treat conservatively with bowel rest, IV fluids, serial abdominal exams and avoidance of GI paralytics such as narcotics or anti-spasmodics. Monitor patient closely.     Diet advanced 2/12; resolved    Hyponatremia  Chronic mild hyponatremia on chart review, now moderate on presentation  Found to be in ADHF on arrival  BNP 2600, volume overloaded, JVD present, bilateral LE edema on consultation  CXR with mild pulmonary congestion  Reports poor diet at home (crackers/peanutbutter)  Reduced albumin likely 2/2 to malnutrition    Previously due to ADHF. Resolved but concern for secondary cause of hypovolemic hyponatremia as patient has had poor PO intake 2/2 abdominal pain    - strict I/Os  - daily CMP  - replace electrolytes  - much improved today      Hypertension  Now Hypotension.  Intermittent difficulties with hypotension while inpatient. Unable to tolerate full HFrEF GDMT.   Continued hypotension while admitted and symptomatic with ambulation. Will continue to titrate.     - Hold GDMT if SBP<95  - encourage PO intake     Hypercholesterolemia  Lipids well controlled  Has documented statin intolerance    - continue ezetimibe    Coronary artery disease  History of Cad with PCIx2 LAD in 2012 and LCX 1/2022  Home regimen: DAPT    No further surgical intervention planned inpatient by CRS or urology.     -  aspirin  - clopidogrel  -  BB as tolerated  - no statin 2/2 intolerance      VTE Risk Mitigation (From admission, onward)         Ordered     enoxaparin injection 40 mg  Daily         02/07/23 1659     Place sequential compression device  Until discontinued         02/06/23 1447     IP VTE HIGH RISK PATIENT  Once         02/06/23 1447                Discharge Planning   SIVAN: 2/19/2023     Code Status: Full Code   Is the patient medically ready for  discharge?:     Reason for patient still in hospital (select all that apply): Patient trending condition  Discharge Plan A: Home Health   Discharge Delays: (!) Diet Not Ready for Discharge              Adia Poon MD  Department of Hospital Medicine   [address]

## 2023-02-20 NOTE — SUBJECTIVE & OBJECTIVE
Interval History: Reported chest pain that was reproducible and started after patient repositioned in bed. EKG and trop negative. CRS doing BID packing changes, still with output from wound but pt states it's improving. CRP down trending. remains euvolemic on maintenance lasix 20 PO daily, metop XL increased to 25 mg; SBP remains too low for full GDMT. HTS for possible inpt consult    Review of Systems   Respiratory:  Positive for shortness of breath.    Cardiovascular:  Positive for chest pain.   Neurological:  Positive for dizziness and light-headedness.  as above  Objective:     Vital Signs (Most Recent):  Temp: 98.6 °F (37 °C) (02/20/23 1542)  Pulse: 95 (02/20/23 1542)  Resp: 18 (02/20/23 1558)  BP: (!) 111/56 (02/20/23 1542)  SpO2: 95 % (02/20/23 1542)   Vital Signs (24h Range):  Temp:  [96.2 °F (35.7 °C)-98.6 °F (37 °C)] 98.6 °F (37 °C)  Pulse:  [86-98] 95  Resp:  [16-20] 18  SpO2:  [93 %-99 %] 95 %  BP: ()/(46-63) 111/56     Weight: 54.9 kg (121 lb)  Body mass index is 21.43 kg/m².    Intake/Output Summary (Last 24 hours) at 2/20/2023 1734  Last data filed at 2/20/2023 1200  Gross per 24 hour   Intake 510 ml   Output 1050 ml   Net -540 ml        Physical Exam  Vitals and nursing note reviewed.   Constitutional:       General: She is not in acute distress.     Appearance: She is ill-appearing.   HENT:      Head: Normocephalic and atraumatic.      Right Ear: External ear normal.      Left Ear: External ear normal.      Nose: No congestion or rhinorrhea.   Eyes:      General:         Right eye: No discharge.         Left eye: No discharge.      Extraocular Movements: Extraocular movements intact.      Pupils: Pupils are equal, round, and reactive to light.   Cardiovascular:      Rate and Rhythm: Normal rate and regular rhythm.      Heart sounds: Normal heart sounds. No murmur heard.  Pulmonary:      Effort: No respiratory distress.      Breath sounds: No wheezing or rales (bibasilar).   Abdominal:       General: There is no distension.      Comments: Colostomy, vertical/midline abdominal excision, bandage clean/dry   Musculoskeletal:         General: No tenderness.      Cervical back: Normal range of motion. No rigidity.      Right lower leg: No edema.      Left lower leg: No edema.   Skin:     Findings: Bruising present. No erythema.   Neurological:      General: No focal deficit present.      Mental Status: She is alert and oriented to person, place, and time.   Psychiatric:         Mood and Affect: Mood normal.         Behavior: Behavior normal.       Significant Labs: All pertinent labs within the past 24 hours have been reviewed.    Significant Imaging: I have reviewed all pertinent imaging results/findings within the past 24 hours.

## 2023-02-20 NOTE — ASSESSMENT & PLAN NOTE
Nutrition consulted. Most recent weight and BMI monitored-                         Measurements:  Wt Readings from Last 1 Encounters:   02/14/23 54.9 kg (121 lb)   Body mass index is 21.43 kg/m².    Recommendations: Recommendation/Intervention: 1) Continue clear liquid   2) Continue Boost Plus & Boost Breeze  3) RD following  Goals: meets % een/epn by next rd f/u    Patient has been screened and assessed by RD. RD will follow patient.

## 2023-02-20 NOTE — ASSESSMENT & PLAN NOTE
History of Cad with PCIx2 LAD in 2012 and LCX 1/2022  Home regimen: DAPT    No further surgical intervention planned inpatient by CRS or urology.   Reported episode of chest pain 2/19 that was reproducible with palpation on examination and no reported episodes while patient ambulating and working with PT though LH and dizziness did occur. EKG and trop reassuring.    -  aspirin  - clopidogrel  -  BB as tolerated  - no statin 2/2 intolerance   Anesthesia Volume In Cc: 2.5

## 2023-02-20 NOTE — ASSESSMENT & PLAN NOTE
Now Hypotension.  Intermittent difficulties with hypotension while inpatient. Unable to tolerate full HFrEF GDMT.   Continued hypotension while admitted and symptomatic with ambulation. Will continue to titrate.     - Hold GDMT if SBP<95  - encourage PO intake

## 2023-02-20 NOTE — ASSESSMENT & PLAN NOTE
Cardiology consulted for management of ADHF, now signed off  Recent echo showed EF of 10%  Patient hypervolemic on presentation, appears more euvolemic today  Home GDMT: Toprol-XL 25, losartan 25 mg.  Initial CXR without new detrimental changes since prior, BNP 2600 on arrival  1x IV lasix on 2/14    Recommendations:   - Patient is mostly unable to tolerate GDMT. Was previously recommended to visit with HTS. Will consult HTS while admitted for additional considerations and even inotropic support given severely reduced EF and persistent hypotension limiting QOL  - continue lasix 20 mg daily PO (half of home dose 40)  - undecided if we will d/c on lasix 20 or 40 daily, will definitely need lasix sliding scale   - replete Mag>2, K>4  - Toprol-XL 25 mg increased with hold parameters  - DC losartan 12.5 mg qd given hypotension   - avoid IV anti-hypertensive  - holding home spironolactone, unable to tolerate full HFrEF GDMT

## 2023-02-20 NOTE — PT/OT/SLP PROGRESS
"Occupational Therapy   Treatment    Name: Odette Hart  MRN: [id number]  Admitting Diagnosis:  Colocutaneous fistula  14 Days Post-Op    Recommendations:     Discharge Recommendations: home health OT  Discharge Equipment Recommendations:  none  Barriers to discharge:       Assessment:     Odette Hart is a 67 y.o. female with a medical diagnosis of Colocutaneous fistula. Performance deficits affecting function are weakness, impaired endurance, impaired self care skills, impaired functional mobility, gait instability, impaired balance, decreased coordination.     Rehab Prognosis:  Good; patient would benefit from acute skilled OT services to address these deficits and reach maximum level of function.       Plan:     Patient to be seen 3 x/week to address the above listed problems via self-care/home management, therapeutic activities, therapeutic exercises  Plan of Care Expires: 03/07/23  Plan of Care Reviewed with: patient    Subjective     "I'm weak."    Pain/Comfort:  Pain Rating 1: 0/10    Objective:     Communicated with: rn prior to session.  Patient found supine with   upon OT entry to room.    General Precautions: Standard, fall    Orthopedic Precautions:N/A  Braces: N/A  Respiratory Status: Room air     Occupational Performance:     Bed Mobility:    Patient completed Rolling/Turning to Right with stand by assistance  Patient completed Scooting/Bridging with stand by assistance  Patient completed Supine to Sit with minimum assistance     Functional Mobility/Transfers:  Patient completed Sit <> Stand Transfer with contact guard assistance  with  rolling walker   Patient completed Bed <> Chair Transfer using Step Transfer technique with contact guard assistance with rolling walker  Functional Mobility: Pt walked 130' SBA with a RW.    Activities of Daily Living:  Grooming: stand by assistance standing at the sink.    [facility] 6 Click ADL: 19    Treatment & Education:  Discussed OT POC and " progress.    Patient left up in chair with all lines intact and call button in reach    GOALS:   Multidisciplinary Problems       Occupational Therapy Goals          Problem: Occupational Therapy    Goal Priority Disciplines Outcome Interventions   Occupational Therapy Goal     OT, PT/OT Ongoing, Progressing    Description: Goals to be met by: 2/28/2023    Patient will increase functional independence with ADLs by performing:    UE Dressing with Set-up Assistance.  LE Dressing with Setup.  Grooming while standing at sink with Modified [place].  Toileting from toilet with Modified [place] for hygiene and clothing management.   Supine to sit with Modified [place].  Stand pivot transfers with Modified [place].  Toilet transfer to toilet with Modified [place].                         Time Tracking:     OT Date of Treatment: 02/20/23  OT Start Time: 1001  OT Stop Time: 1024  OT Total Time (min): 23 min    Billable Minutes:Self Care/Home Management 8  Therapeutic Activity 15    OT/FROILAN: OT          2/20/2023

## 2023-02-20 NOTE — ASSESSMENT & PLAN NOTE
History of Cad with PCIx2 LAD in 2012 and LCX 1/2022  Home regimen: DAPT    No further surgical intervention planned inpatient by CRS or urology.     -  aspirin  - clopidogrel  -  BB as tolerated  - no statin 2/2 intolerance

## 2023-02-20 NOTE — ASSESSMENT & PLAN NOTE
Chronic mild hyponatremia on chart review, now moderate on presentation  Found to be in ADHF on arrival  BNP 2600, volume overloaded, JVD present, bilateral LE edema on consultation  CXR with mild pulmonary congestion  Reports poor diet at home (crackers/peanutbutter)  Reduced albumin likely 2/2 to malnutrition    Previously due to ADHF. Resolved but concern for secondary cause of hypovolemic hyponatremia as patient has had poor PO intake 2/2 abdominal pain    - strict I/Os  - daily CMP  - replace electrolytes  - much improved today

## 2023-02-20 NOTE — ASSESSMENT & PLAN NOTE
Patient has Post-operative ileus which is adynamic in etiology which is improving. This is inherent to her procedure/medications. Will treat conservatively with bowel rest, IV fluids, serial abdominal exams and avoidance of GI paralytics such as narcotics or anti-spasmodics. Monitor patient closely.     Diet advanced 2/12; resolved

## 2023-02-20 NOTE — ASSESSMENT & PLAN NOTE
S/p colocutaneous fistula revision with intra-op R ureteral stent placement for hydronephrosis. Developed post-op ileus.   Required post-op PCA pump now discontinued and on PRN opioids    - oxy Q4 PRN and continue bowel regimen  - Continue Wound care  - per primary

## 2023-02-20 NOTE — PT/OT/SLP PROGRESS
"Physical Therapy Treatment    Patient Name:  Odette Hart   MRN:  [id number]    Recommendations:     Discharge Recommendations: home health PT  Discharge Equipment Recommendations: none  Barriers to discharge: None    Assessment:     Odette Hart is a 67 y.o. female admitted with a medical diagnosis of Colocutaneous fistula.  She presents with the following impairments/functional limitations: weakness, impaired endurance, gait instability, impaired functional mobility, impaired balance, impaired self care skills. Pt required extra time with all mobility due to abdominal pain on this date. Pt t/f'ed with CGA, and ambulated CGA using RW. Pt showed improvement with bed mobility and only required SBA for sit to supine. Pt will continue to benefit from skilled PT services to improve all deficits noted above. Resume PT POC as indicated.     Rehab Prognosis: Good; patient would benefit from acute skilled PT services to address these deficits and reach maximum level of function.    Recent Surgery: Procedure(s) (LRB):  REVISION, COLOSTOMY (N/A)  INSERTION, CATHETER, URETER, BILATERAL (Left)  CYSTOSCOPY, WITH URETERAL STENT INSERTION (Right)  DEBRIDEMENT, ABDOMEN (N/A)  COLECTOMY, PARTIAL  LYSIS, ADHESIONS (N/A) 14 Days Post-Op    Plan:     During this hospitalization, patient to be seen 3 x/week to address the identified rehab impairments via gait training, therapeutic activities, therapeutic exercises and progress toward the following goals:    Plan of Care Expires:  03/09/23    Subjective     "I would like to get back in bed please."    Pain/Comfort:  Pain Rating 1: 6/10  Location - Orientation 1: generalized  Location 1: abdomen  Pain Addressed 1: Reposition, Distraction  Pain Rating Post-Intervention 1: 6/10      Objective:     Communicated with nursing prior to session.  Patient found up in chair with  (all lines intact) upon PT entry to room.     General Precautions: Standard, fall  Orthopedic Precautions: " N/A  Braces: N/A  Respiratory Status: Nasal cannula, flow 2 L/min     Functional Mobility:  Bed Mobility:  Scooting: stand by assistance  Sit to Supine: stand by assistance  Transfers:  Sit to Stand:  contact guard assistance with rolling walker  Toilet Transfer: contact guard assistance with  rolling walker  using  Step Transfer  Gait: x2 trials of 14ft with RW and CGA for safety. Distance limited due to pain and fatigue.   Balance: static/dynamic  standing balance was fair.       AM-PAC 6 CLICK MOBILITY  Turning over in bed (including adjusting bedclothes, sheets and blankets)?: 3  Sitting down on and standing up from a chair with arms (e.g., wheelchair, bedside commode, etc.): 3  Moving from lying on back to sitting on the side of the bed?: 3  Moving to and from a bed to a chair (including a wheelchair)?: 3  Need to walk in hospital room?: 3  Climbing 3-5 steps with a railing?: 2  Basic Mobility Total Score: 17       Treatment & Education:  -BLE therex x10 reps: AP,LAQ,HF  -All questions/concerns answered within PTA scope of practice.     Patient left HOB elevated with all lines intact, call button in reach, and nursing notified..    GOALS:   Multidisciplinary Problems       Physical Therapy Goals          Problem: Physical Therapy    Goal Priority Disciplines Outcome Goal Variances Interventions   Physical Therapy Goal     PT, PT/OT Ongoing, Progressing     Description: Goals to be met by: 2/15/2023     Patient will increase functional independence with mobility by performin. Gait  x 250 feet with Supervision using LRAD.   2. Ascend/descend 4 stair with right Handrails Supervision using no Assistive device.   3. Sit to Stand with Supervision using LRAD                       Time Tracking:     PT Received On: 23  PT Start Time: 1240     PT Stop Time: 1303  PT Total Time (min): 23 min     Billable Minutes: Gait Training 8 and Therapeutic Activity 15    Treatment Type: Treatment  PT/PTA: PTA     PTA  Visit Number: 1 02/20/2023

## 2023-02-20 NOTE — NURSING
POC reviewed with patient. PT with with continued abdominal pain tx with meds. OOB with PT to chair, Complains of severe weakness. Left ostomy with small amt of thick stool, Abdominal incsion with small amt of serosanguinius drainage, dsg changed.PW to wall suction. WCTM

## 2023-02-20 NOTE — PLAN OF CARE
POC reviewed. AAOx4, Hypotensive with SBP's high 80's to low 100's, easily tires and becomes SOB with any activity, assisting in turning to change adult brief or pull self up in bed. Otherwise VSS on RA with no complaints of SOB, headaches, or dizziness. Urine output per PureWick to low continuous wall suction canister, 600 mL dark elissa urine output. Regular diet, no intake during evening shift. One complaint of nausea in the morning after assisting with adult brief change and resulting SOB and chest discomfort. No BM and no complaints of N/V. Pain controlled with prn pain medications. Resting with side rails up and call light within reach. Continuing to monitor patient status.

## 2023-02-20 NOTE — SUBJECTIVE & OBJECTIVE
Interval History: MAHENDRAON, CRS doing BID packing changes, still with output from wound but pt states it's improving.  remains euvolemic on maintenance lasix 20 PO daily, metop XL 12.5 qd; SBP remains too low for full GDMT. Planning to discuss with HTS for possible inpt consult    Review of Systems as above  Objective:     Vital Signs (Most Recent):  Temp: 98.1 °F (36.7 °C) (02/19/23 1558)  Pulse: 87 (02/19/23 1627)  Resp: 18 (02/19/23 1636)  BP: (!) 101/54 (02/19/23 1627)  SpO2: 98 % (02/19/23 1558)   Vital Signs (24h Range):  Temp:  [97.6 °F (36.4 °C)-98.1 °F (36.7 °C)] 98.1 °F (36.7 °C)  Pulse:  [75-91] 87  Resp:  [16-20] 18  SpO2:  [93 %-98 %] 98 %  BP: ()/(48-56) 101/54     Weight: 54.9 kg (121 lb)  Body mass index is 21.43 kg/m².    Intake/Output Summary (Last 24 hours) at 2/19/2023 1833  Last data filed at 2/19/2023 1357  Gross per 24 hour   Intake 580 ml   Output 775 ml   Net -195 ml      Physical Exam lungs clear, no JVD, no LE edema    Significant Labs: All pertinent labs within the past 24 hours have been reviewed.    Significant Imaging: I have reviewed all pertinent imaging results/findings within the past 24 hours.

## 2023-02-20 NOTE — PROGRESS NOTES
"Palravennt states he has had some problems with loose stools small amount of blood on stools at times. He has stopped drinking ETOH and coffee and symptoms are markedly improved. He wants to get established with a provider at  and have a complete physical. Feeling \"great\" today.  Carli Draper RN  [facility]    " [facility]  Progress Note    Patient Name: Odette Hart  MRN: [id number]  Patient Class: IP- Inpatient   Admission Date: 1/31/2023  Length of Stay: 20 days  Attending Physician: Rafa Cardozo MD  Primary Care Provider: Braxton Barragan MD        Subjective:     Principal Problem:Colocutaneous fistula        HPI:  68 yo F with HTN, HLD (statin intolerance), DM, CAD s/p PCI to LAD (2012)/LCX (1/2022), HFrEF (EF10%), debility, severe malnutrition, and the following procedures performed 5/2022: cholecystectomy, takedown of colovesical and colovaginal fistula, sigmoid colectomy, partial cecectomy, end colostomy who presents for colocutaneous fistula repair with abdominal wall reconstruction. On admission she was found to be hyponatremic and in acute on chronic decompensated heart failure. Cardiology was consulted to manage her HF and have since signed off as her volume status has become more euvolemic following diuresis. Medicine was consulted for pr-eop evaluation and continued management of her hyponatremia. Per chart review patient is chronically, mildly hyponatremic at baseline. She takes Lasix, Toprol, spironolactone, and losartan for her HFrEF. She reports correct use of her home medications. She states her diet consists of peanut butter and crackers. Denies CP/SOB/nausea vomiting, cough fever chills, polydipsia. Her Na has been improving with diuresis. Patient has multiple risk factors that complicate surgery: acute on chronic decompensated HF, CAD with PCIx2, hypertension, hyperlipidemia, malnutrition. This being said she does not have reported/documented/observed dysfunction of liver, kidney, lung, clotting.    On examination, she is chronically ill appearing/in no acute distress/at mental baseline. AF, /58 (78), sat 98% on RA, no leukocytosis, hgb 11.8, Hct 38.5, Plts 237, moderate Hyponatremia Na 124 (initially 122 on admission) with labile Baseline, K 3.1, Cl 85, Bun 13,  Cr 00.8, Ca 7.9, albumin 2-3. BNP 2600, UOP 2300 on lasix 80mg    CXR with mild pulmonary congestion      Overview/Hospital Course:  Admitted for planned colocutaneous fistula repair, found to be hyponatremic and in ADHF. Started on IV diuresis, >6 L UOP, returned to euvolemia. Urine/serum studies consistent with hypervolemic hyponatremia most likely in 2/2 to acute on chronic HF. She does have multiple factors that maker her high risk for surgery, primarily her severrely reduced LVEF 10%. Cardiology and medicine consulted for pre-operative evaluation. Agree she is high risk, but need of her procedure outweigh the risks. 2/6 Patient tolerated colocutaneous takedown/reconstruction well. Her Hyponatremia has resolved with Lasix 80mg BID. S/p colocutaneous fistula revision with intra-op R ureteral stent placement for hydronephrosis. Developed post-op ileus. Given lasix holiday for over diuresis. Plavix, ASA restarted post-op. Anticipate discharge on prior home PO lasix dosing.     Complaint of dysuria and dark cloudy urine noted by primary team, patient started on cipro then transitioned to cefpodoxime (broader coverage) given culture results of multiple organisms none in predominance (thus an unequivocal UCx).  Recently discontinued from PCA pump with PRN pain management of oxy 5 and 10 mg. Restarted lasix 20 mg on 2/14/23. CRS monitoring surgical site closely, some output of serous fluid, no signs of cellulitis. Continued symptomatic hypotension despite discontinuing GDMT. Consulting HTS for further evaluation and euvolemic on examination.       Interval History: Reported chest pain that was reproducible and started after patient repositioned in bed. EKG and trop negative. CRS doing BID packing changes, still with output from wound but pt states it's improving. CRP down trending. remains euvolemic on maintenance lasix 20 PO daily, metop XL increased to 25 mg; SBP remains too low for full GDMT. HTS for possible inpt  consult    Review of Systems   Respiratory:  Positive for shortness of breath.    Cardiovascular:  Positive for chest pain.   Neurological:  Positive for dizziness and light-headedness.  as above  Objective:     Vital Signs (Most Recent):  Temp: 98.6 °F (37 °C) (02/20/23 1542)  Pulse: 95 (02/20/23 1542)  Resp: 18 (02/20/23 1558)  BP: (!) 111/56 (02/20/23 1542)  SpO2: 95 % (02/20/23 1542)   Vital Signs (24h Range):  Temp:  [96.2 °F (35.7 °C)-98.6 °F (37 °C)] 98.6 °F (37 °C)  Pulse:  [86-98] 95  Resp:  [16-20] 18  SpO2:  [93 %-99 %] 95 %  BP: ()/(46-63) 111/56     Weight: 54.9 kg (121 lb)  Body mass index is 21.43 kg/m².    Intake/Output Summary (Last 24 hours) at 2/20/2023 1734  Last data filed at 2/20/2023 1200  Gross per 24 hour   Intake 510 ml   Output 1050 ml   Net -540 ml        Physical Exam  Vitals and nursing note reviewed.   Constitutional:       General: She is not in acute distress.     Appearance: She is ill-appearing.   HENT:      Head: Normocephalic and atraumatic.      Right Ear: External ear normal.      Left Ear: External ear normal.      Nose: No congestion or rhinorrhea.   Eyes:      General:         Right eye: No discharge.         Left eye: No discharge.      Extraocular Movements: Extraocular movements intact.      Pupils: Pupils are equal, round, and reactive to light.   Cardiovascular:      Rate and Rhythm: Normal rate and regular rhythm.      Heart sounds: Normal heart sounds. No murmur heard.  Pulmonary:      Effort: No respiratory distress.      Breath sounds: No wheezing or rales (bibasilar).   Abdominal:      General: There is no distension.      Comments: Colostomy, vertical/midline abdominal excision, bandage clean/dry   Musculoskeletal:         General: No tenderness.      Cervical back: Normal range of motion. No rigidity.      Right lower leg: No edema.      Left lower leg: No edema.   Skin:     Findings: Bruising present. No erythema.   Neurological:      General: No focal  deficit present.      Mental Status: She is alert and oriented to person, place, and time.   Psychiatric:         Mood and Affect: Mood normal.         Behavior: Behavior normal.       Significant Labs: All pertinent labs within the past 24 hours have been reviewed.    Significant Imaging: I have reviewed all pertinent imaging results/findings within the past 24 hours.      Assessment/Plan:      * Colocutaneous fistula  S/p colocutaneous fistula revision with intra-op R ureteral stent placement for hydronephrosis. Developed post-op ileus.   Required post-op PCA pump now discontinued and on PRN opioids    - oxy Q4 PRN and continue bowel regimen  - Continue Wound care  - per primary     Ileus  Patient has Post-operative ileus which is adynamic in etiology which is improving. This is inherent to her procedure/medications. Will treat conservatively with bowel rest, IV fluids, serial abdominal exams and avoidance of GI paralytics such as narcotics or anti-spasmodics. Monitor patient closely.     Diet advanced 2/12; resolved    Severe protein-calorie malnutrition  Nutrition consulted. Most recent weight and BMI monitored-                         Measurements:  Wt Readings from Last 1 Encounters:   02/14/23 54.9 kg (121 lb)   Body mass index is 21.43 kg/m².    Recommendations: Recommendation/Intervention: 1) Continue clear liquid   2) Continue Boost Plus & Boost Breeze  3) RD following  Goals: meets % een/epn by next rd f/u    Patient has been screened and assessed by RD. RD will follow patient.      Hyponatremia  Chronic mild hyponatremia on chart review, now moderate on presentation  Found to be in ADHF on arrival  BNP 2600, volume overloaded, JVD present, bilateral LE edema on consultation  CXR with mild pulmonary congestion  Reports poor diet at home (crackers/peanutbutter)  Reduced albumin likely 2/2 to malnutrition    Previously due to ADHF. Resolved but concern for secondary cause of hypovolemic hyponatremia  as patient has had poor PO intake 2/2 abdominal pain    - strict I/Os  - daily CMP  - replace electrolytes  - much improved today      Hypertension  Now Hypotension.  Intermittent difficulties with hypotension while inpatient. Unable to tolerate full HFrEF GDMT.   Continued hypotension while admitted and symptomatic with ambulation. Will continue to titrate.     - Hold GDMT if SBP<95  - encourage PO intake     Hypercholesterolemia  Lipids well controlled  Has documented statin intolerance    - continue ezetimibe    Coronary artery disease  History of Cad with PCIx2 LAD in 2012 and LCX 1/2022  Home regimen: DAPT    No further surgical intervention planned inpatient by CRS or urology.   Reported episode of chest pain 2/19 that was reproducible with palpation on examination and no reported episodes while patient ambulating and working with PT though LH and dizziness did occur. EKG and trop reassuring.    -  aspirin  - clopidogrel  -  BB as tolerated  - no statin 2/2 intolerance    Ischemic cardiomyopathy  Cardiology consulted for management of ADHF, now signed off  Recent echo showed EF of 10%  Patient hypervolemic on presentation, appears more euvolemic today  Home GDMT: Toprol-XL 25, losartan 25 mg.  Initial CXR without new detrimental changes since prior, BNP 2600 on arrival  1x IV lasix on 2/14    Recommendations:   - Patient is mostly unable to tolerate GDMT. Was previously recommended to visit with HTS. Will consult HTS while admitted for additional considerations and even inotropic support given severely reduced EF and persistent hypotension limiting QOL  - continue lasix 20 mg daily PO (half of home dose 40)  - undecided if we will d/c on lasix 20 or 40 daily, will definitely need lasix sliding scale   - replete Mag>2, K>4  - Toprol-XL 25 mg increased with hold parameters  - DC losartan 12.5 mg qd given hypotension   - avoid IV anti-hypertensive  - holding home spironolactone, unable to tolerate full HFrEF  GDMT      VTE Risk Mitigation (From admission, onward)         Ordered     enoxaparin injection 40 mg  Daily         02/07/23 1659     Place sequential compression device  Until discontinued         02/06/23 1447     IP VTE HIGH RISK PATIENT  Once         02/06/23 1447                Discharge Planning   SIVAN: 2/22/2023     Code Status: Full Code   Is the patient medically ready for discharge?:     Reason for patient still in hospital (select all that apply): Patient trending condition  Discharge Plan A: Home Health   Discharge Delays: (!) Diet Not Ready for Discharge              Ml Lagos DO  Department of Hospital Medicine   [address] [facility] [place]

## 2023-02-21 NOTE — ASSESSMENT & PLAN NOTE
History of Cad with PCIx2 LAD in 2012 and LCX 1/2022  Home regimen: DAPT    No further surgical intervention planned inpatient by CRS or urology.   Reported episode of chest pain 2/19 that was reproducible with palpation on examination and no reported episodes while patient ambulating and working with PT though LH and dizziness did occur. EKG and trop reassuring.    -  aspirin  - clopidogrel  -  BB as tolerated  - no statin 2/2 intolerance

## 2023-02-21 NOTE — PROGRESS NOTES
[facility]  Progress Note    Patient Name: Odette Hart  MRN: [id number]  Patient Class: IP- Inpatient   Admission Date: 1/31/2023  Length of Stay: 21 days  Attending Physician: Rafa Cardozo MD  Primary Care Provider: Braxton Barragan MD        Subjective:     Principal Problem:Colocutaneous fistula        HPI:  66 yo F with HTN, HLD (statin intolerance), DM, CAD s/p PCI to LAD (2012)/LCX (1/2022), HFrEF (EF10%), debility, severe malnutrition, and the following procedures performed 5/2022: cholecystectomy, takedown of colovesical and colovaginal fistula, sigmoid colectomy, partial cecectomy, end colostomy who presents for colocutaneous fistula repair with abdominal wall reconstruction. On admission she was found to be hyponatremic and in acute on chronic decompensated heart failure. Cardiology was consulted to manage her HF and have since signed off as her volume status has become more euvolemic following diuresis. Medicine was consulted for pr-eop evaluation and continued management of her hyponatremia. Per chart review patient is chronically, mildly hyponatremic at baseline. She takes Lasix, Toprol, spironolactone, and losartan for her HFrEF. She reports correct use of her home medications. She states her diet consists of peanut butter and crackers. Denies CP/SOB/nausea vomiting, cough fever chills, polydipsia. Her Na has been improving with diuresis. Patient has multiple risk factors that complicate surgery: acute on chronic decompensated HF, CAD with PCIx2, hypertension, hyperlipidemia, malnutrition. This being said she does not have reported/documented/observed dysfunction of liver, kidney, lung, clotting.    On examination, she is chronically ill appearing/in no acute distress/at mental baseline. AF, /58 (78), sat 98% on RA, no leukocytosis, hgb 11.8, Hct 38.5, Plts 237, moderate Hyponatremia Na 124 (initially 122 on admission) with labile Baseline, K 3.1, Cl 85, Bun 13,  Cr 00.8, Ca 7.9, albumin 2-3. BNP 2600, UOP 2300 on lasix 80mg    CXR with mild pulmonary congestion      Overview/Hospital Course:  Admitted for planned colocutaneous fistula repair, found to be hyponatremic and in ADHF. Started on IV diuresis, >6 L UOP, returned to euvolemia. Urine/serum studies consistent with hypervolemic hyponatremia most likely in 2/2 to acute on chronic HF. She does have multiple factors that maker her high risk for surgery, primarily her severrely reduced LVEF 10%. Cardiology and medicine consulted for pre-operative evaluation. Agree she is high risk, but need of her procedure outweigh the risks. 2/6 Patient tolerated colocutaneous takedown/reconstruction well. Her Hyponatremia has resolved with Lasix 80mg BID. S/p colocutaneous fistula revision with intra-op R ureteral stent placement for hydronephrosis. Developed post-op ileus. Given lasix holiday for over diuresis. Plavix, ASA restarted post-op. Anticipate discharge on prior home PO lasix dosing.     Complaint of dysuria and dark cloudy urine noted by primary team, patient started on cipro then transitioned to cefpodoxime (broader coverage) given culture results of multiple organisms none in predominance (thus an unequivocal UCx).  Recently discontinued from PCA pump with PRN pain management of oxy 5 and 10 mg. Restarted lasix 20 mg on 2/14/23. CRS monitoring surgical site closely, some output of serous fluid, no signs of cellulitis. Continued symptomatic hypotension despite discontinuing GDMT. Consulting [facility] for further evaluation and euvolemic on examination.       Interval History: No further episodes of chest pain but ongoing hypotension.     Review of Systems   Constitutional:  Negative for chills and fever.   HENT:  Negative for sinus pain.    Eyes:  Negative for pain and visual disturbance.   Respiratory:  Negative for cough and shortness of breath.    Cardiovascular:  Negative for chest pain, palpitations and leg swelling.  Inpatient Physical Exam   Gastrointestinal:  Negative for blood in stool, diarrhea and vomiting.   Genitourinary:  Negative for difficulty urinating, dysuria and hematuria.   Neurological:  Positive for dizziness, weakness and light-headedness.   Objective:     Vital Signs (Most Recent):  Temp: 97.9 °F (36.6 °C) (02/21/23 1156)  Pulse: 86 (02/21/23 1156)  Resp: 18 (02/21/23 1210)  BP: (!) 86/48 (02/21/23 1156)  SpO2: 96 % (02/21/23 1156)   Vital Signs (24h Range):  Temp:  [97.3 °F (36.3 °C)-98.6 °F (37 °C)] 97.9 °F (36.6 °C)  Pulse:  [79-95] 86  Resp:  [16-20] 18  SpO2:  [95 %-97 %] 96 %  BP: ()/(43-56) 86/48     Weight: 54.9 kg (121 lb)  Body mass index is 21.43 kg/m².    Intake/Output Summary (Last 24 hours) at 2/21/2023 1305  Last data filed at 2/21/2023 1140  Gross per 24 hour   Intake 1050 ml   Output 530 ml   Net 520 ml      Physical Exam  Vitals and nursing note reviewed.   Constitutional:       General: She is not in acute distress.     Appearance: She is ill-appearing.   HENT:      Head: Normocephalic and atraumatic.      Right Ear: External ear normal.      Left Ear: External ear normal.      Nose: No congestion or rhinorrhea.   Eyes:      General:         Right eye: No discharge.         Left eye: No discharge.      Extraocular Movements: Extraocular movements intact.      Pupils: Pupils are equal, round, and reactive to light.   Cardiovascular:      Rate and Rhythm: Normal rate and regular rhythm.      Heart sounds: Normal heart sounds. No murmur heard.  Pulmonary:      Effort: No respiratory distress.      Breath sounds: No wheezing or rales (bibasilar).   Abdominal:      General: There is no distension.      Comments: Colostomy, vertical/midline abdominal excision, bandage clean/dry   Musculoskeletal:         General: No tenderness.      Cervical back: Normal range of motion. No rigidity.      Right lower leg: No edema.      Left lower leg: No edema.   Skin:     Findings: Bruising present. No erythema.   Neurological:       General: No focal deficit present.      Mental Status: She is alert and oriented to person, place, and time.   Psychiatric:         Mood and Affect: Mood normal.         Behavior: Behavior normal.       Significant Labs: All pertinent labs within the past 24 hours have been reviewed.    Significant Imaging: I have reviewed all pertinent imaging results/findings within the past 24 hours.      Assessment/Plan:      * Colocutaneous fistula  S/p colocutaneous fistula revision with intra-op R ureteral stent placement for hydronephrosis. Developed post-op ileus.   Required post-op PCA pump now discontinued and on PRN opioids    - oxy Q4 PRN and continue bowel regimen  - Continue Wound care  - per primary     Ileus  Patient has Post-operative ileus which is adynamic in etiology which is improving. This is inherent to her procedure/medications. Will treat conservatively with bowel rest, IV fluids, serial abdominal exams and avoidance of GI paralytics such as narcotics or anti-spasmodics. Monitor patient closely.     Diet advanced 2/12; resolved    Severe protein-calorie malnutrition  Nutrition consulted. Most recent weight and BMI monitored-                         Measurements:  Wt Readings from Last 1 Encounters:   02/14/23 54.9 kg (121 lb)   Body mass index is 21.43 kg/m².    Recommendations: Recommendation/Intervention: 1) Continue clear liquid   2) Continue Boost Plus & Boost Breeze  3) RD following  Goals: meets % een/epn by next rd f/u    Patient has been screened and assessed by RD. RD will follow patient.      Hyponatremia  Chronic mild hyponatremia on chart review, now moderate on presentation  Found to be in ADHF on arrival  BNP 2600, volume overloaded, JVD present, bilateral LE edema on consultation  CXR with mild pulmonary congestion  Reports poor diet at home (crackers/peanutbutter)  Reduced albumin likely 2/2 to malnutrition    Previously due to ADHF. Resolved but concern for secondary cause of  hypovolemic hyponatremia as patient has had poor PO intake 2/2 abdominal pain    - strict I/Os  - daily CMP  - replace electrolytes        Hypertension  Now Hypotension.  Intermittent difficulties with hypotension while inpatient. Unable to tolerate full HFrEF GDMT.   Continued hypotension while admitted and symptomatic with ambulation. Will continue to titrate.     - Hold GDMT if SBP<95  - encourage PO intake     Hypercholesterolemia  Lipids well controlled  Has documented statin intolerance    - continue ezetimibe    Coronary artery disease  History of Cad with PCIx2 LAD in 2012 and LCX 1/2022  Home regimen: DAPT    No further surgical intervention planned inpatient by CRS or urology.   Reported episode of chest pain 2/19 that was reproducible with palpation on examination and no reported episodes while patient ambulating and working with PT though LH and dizziness did occur. EKG and trop reassuring.    -  aspirin  - clopidogrel  -  BB as tolerated  - no statin 2/2 intolerance    Ischemic cardiomyopathy  Cardiology consulted for management of ADHF, now signed off  Recent echo showed EF of 10%  Patient hypervolemic on presentation, appears more euvolemic today  Home GDMT: Toprol-XL 25, losartan 25 mg.  Initial CXR without new detrimental changes since prior, BNP 2600 on arrival  1x IV lasix on 2/14    Recommendations:   - Patient is mostly unable to tolerate GDMT. Was previously recommended to visit with HTS. Will consult HTS while admitted for additional considerations and even inotropic support given severely reduced EF and persistent hypotension limiting QOL  - continue lasix 20 mg daily PO (half of home dose 40)  - undecided if we will d/c on lasix 20 or 40 daily, will definitely need lasix sliding scale   - replete Mag>2, K>4  - Toprol-XL 25 mg increased with hold parameters  - DC losartan 12.5 mg qd given hypotension   - avoid IV anti-hypertensive  - holding home spironolactone, unable to tolerate full HFrEF  GDMT      VTE Risk Mitigation (From admission, onward)         Ordered     enoxaparin injection 40 mg  Daily         02/07/23 1659     Place sequential compression device  Until discontinued         02/06/23 1447     IP VTE HIGH RISK PATIENT  Once         02/06/23 1447                Discharge Planning   SIVAN: 2/22/2023     Code Status: Full Code   Is the patient medically ready for discharge?:     Reason for patient still in hospital (select all that apply): Patient trending condition  Discharge Plan A: Home Health   Discharge Delays: (!) Diet Not Ready for Discharge              Ml Lagos DO  Department of Hospital Medicine   [address] - [place]

## 2023-02-21 NOTE — PLAN OF CARE
POC reviewed. AAOx4, Hypotensive with SBP's high 90's to low 100's. Otherwise VSS on RA with no complaints of SOB, headaches, or dizziness. Up to shower near start of shift, MD requested, tolerated well. Urine output per PureWick to low continuous wall suction canister, 300 mL dark elissa urine output. Regular diet, no intake during evening shift. No complaint of N/V. Bowel output per ostomy. Ostomy changed after shower, MD prepared ostomy bag. Scant bowel output to ostomy bag since bag change, no N/V. Pain controlled with prn pain medications. Resting with side rails up and call light within reach. Continuing to monitor patient status.

## 2023-02-21 NOTE — PROGRESS NOTES
Patient Name: Odette Hart  Date: 2/21/2023  Service: Colon and Rectal Surgery    Subjective:  NAEON. Midline incision continues to have serosanguinous drainage, but no active infection. Pt is more alert and in better spirits today. Good colostomy output.     Medications:    Current Facility-Administered Medications:     [COMPLETED] acetaminophen 1,000 mg/100 mL (10 mg/mL) injection 1,000 mg, 1,000 mg, Intravenous, Q8H, Stopped at 02/07/23 0527 **FOLLOWED BY** acetaminophen tablet 1,000 mg, 1,000 mg, Oral, Q8H, Pablo Ramirez MD, 1,000 mg at 02/20/23 1408    albuterol inhaler 2 puff, 2 puff, Inhalation, Q6H PRN, Pablo Ramirez MD    ALPRAZolam tablet 0.5 mg, 0.5 mg, Oral, Nightly PRN, Rafa Cardozo MD, 0.5 mg at 02/21/23 0227    aspirin EC tablet 81 mg, 81 mg, Oral, Daily, Pablo Ramirez MD, 81 mg at 02/21/23 0815    balsam peru-castor oiL Oint, , Topical (Top), BID, Pablo Ramirez MD, Given at 02/21/23 0817    clopidogreL tablet 75 mg, 75 mg, Oral, Daily, Pablo Ramirez MD, 75 mg at 02/21/23 0816    dextrose 10% bolus 125 mL 125 mL, 12.5 g, Intravenous, PRN, Pablo Ramirez MD    dextrose 10% bolus 250 mL 250 mL, 25 g, Intravenous, PRN, Pablo Ramirez MD    enoxaparin injection 40 mg, 40 mg, Subcutaneous, Daily, Pablo Ramirez MD, 40 mg at 02/20/23 1655    ezetimibe tablet 10 mg, 10 mg, Oral, Daily, Pablo Ramirez MD, 10 mg at 02/21/23 0815    famotidine tablet 20 mg, 20 mg, Oral, BID, Rafa Cardozo MD, 20 mg at 02/21/23 0816    fluticasone propionate 50 mcg/actuation nasal spray 50 mcg, 1 spray, Each Nostril, Daily PRN, Pablo Ramirez MD    furosemide tablet 20 mg, 20 mg, Oral, Daily, Keanu Lagos DO, 20 mg at 02/21/23 0815    metoprolol succinate (TOPROL-XL) 24 hr tablet 25 mg, 25 mg, Oral, Daily, Adia Poon MD, 25 mg at 02/21/23 0815    naloxone 0.4 mg/mL injection 0.02 mg, 0.02 mg, Intravenous, PRN, Pablo Ramirez MD    nitroGLYCERIN SL tablet 0.4 mg,  0.4 mg, Sublingual, Q5 Min PRN, Pablo Ramirez MD    nystatin 100,000 unit/mL suspension 500,000 Units, 500,000 Units, Oral, QID, Fahad Smith MD, 500,000 Units at 02/20/23 0955    ondansetron injection 4 mg, 4 mg, Intravenous, Q6H PRN, Pablo Ramirez MD, 4 mg at 02/20/23 1148    oxyCODONE immediate release tablet 5 mg, 5 mg, Oral, Q4H PRN, Keanu Lagos DO, 5 mg at 02/19/23 2154    oxyCODONE immediate release tablet Tab 10 mg, 10 mg, Oral, Q4H PRN, Keanu Lagos DO, 10 mg at 02/21/23 1210    polyethylene glycol packet 17 g, 17 g, Oral, Daily, Rafa Cardozo MD, 17 g at 02/21/23 0816    prochlorperazine injection Soln 5 mg, 5 mg, Intravenous, Q6H PRN, Pablo Ramirez MD, 5 mg at 02/20/23 0542    simethicone chewable tablet 80 mg, 1 tablet, Oral, TID PRN, Giovany Gleason MD, 80 mg at 02/20/23 0023    sodium chloride 0.9% flush 10 mL, 10 mL, Intravenous, PRN, Pablo Ramirez MD    sodium chloride 0.9% flush 10 mL, 10 mL, Intra-Catheter, PRN, Pablo Ramirez MD    Vital Signs:  Vitals:    02/21/23 1210   BP:    Pulse:    Resp: 18   Temp:        In/Out:  Intake/Output - Last 3 Shifts         02/19 0700  02/20 0659 02/20 0700  02/21 0659 02/21 0700  02/22 0659    P.O. 1080 650 550    I.V. (mL/kg) 100 (1.8)      Total Intake(mL/kg) 1180 (21.5) 650 (11.8) 550 (10)    Urine (mL/kg/hr) 900 (0.7) 530 (0.4)     Other 0      Stool 200 0     Total Output 1100 530     Net +80 +120 +550           Stool Occurrence 10 x 2 x 1 x            Physical Exam:  General: Alert, oriented, in no apparent distress  HEENT: Sclera anicteric, trachea midline  Lungs: Normal respiratory rate and effort on room air  Abdomen: Soft, minimal renata-incisional TTP, nondistended. Stable/slightly improved inferior renata-incisional blanching erythema without tenderness, persistent serous>serosanguinous drainage from incision. LUQ end colostomy pink and well perfused with soft stool and flatus in bag.  Extremities: Warm, well perfused, no  edema  Neuro: Grossly intact, moves all extremities  Psych: Appropriate affect    Laboratory Studies:  Complete Blood Count:  Lab Results   Component Value Date/Time    WBC 4.70 02/20/2023 02:26 AM    HGB 9.6 (L) 02/20/2023 02:26 AM    HCT 32.0 (L) 02/20/2023 02:26 AM    HCT 46 05/06/2022 09:55 PM    RBC 4.08 02/20/2023 02:26 AM     02/20/2023 02:26 AM       Basic Chemistry Panel:  Lab Results   Component Value Date/Time     (L) 02/20/2023 02:26 AM    K 4.0 02/20/2023 02:26 AM    CL 93 (L) 02/20/2023 02:26 AM    CO2 31 (H) 02/20/2023 02:26 AM    BUN 11 02/20/2023 02:26 AM    CREATININE 0.6 02/20/2023 02:26 AM    CALCIUM 8.8 02/20/2023 02:26 AM       Hepatic Panel:  Lab Results   Component Value Date/Time    AST 36 02/06/2023 01:15 PM    ALT 23 02/06/2023 01:15 PM    ALKPHOS 116 02/06/2023 01:15 PM    BILITOT 1.4 (H) 02/06/2023 01:15 PM    ALBUMIN 1.7 (L) 02/06/2023 01:15 PM       Coagulation Panel:  Lab Results   Component Value Date/Time    INR 1.2 07/21/2022 04:28 AM       Imaging Studies:  XR Abd (2/8/23):  There is a right-sided ureteral stent identified.  No significant intra-abdominal calcifications noted.  No significant localized bowel dilatation.  Rounded soft tissue density in the left paraspinal area probably related to the previous surgery.    Assessment:  Odette Hart is a 67 y.o. year old female with history of HTN, CAD s/p PCI c/b HFrEF (EF 10%), MDD, debility, severe malnutrition, and open cholecystectomy and takedown of colovesical and colovaginal fistuli (two separate areas) with sigmoid colectomy, partial cecectomy and appendectomy, drainage of intra-abdominal abscess and end colostomy 5/9/2022 recently admitted 12/22/22 - 12/30/22 for new colocutaneous fistula admitted 1/32/23 with incarcerated colonic prolapse from her fistula s/p manual reduction now s/p open transverse/descending colectomy with takedown of colocutaneous fistula and end colostomy + parastomal hernia repair +  lysis of adhesions >60 minutes (Juliane) + debridement of skin/subcutaneos fat/fascia/muscle (Daniel) 2/7/23, postoperative course complicated by initial ileus since resolved.     Plan:  - Cont regular diet as tolerated  - Cont multimodal oral analgesia for postoperative pain to minimize narcotics   - cefpodoxime completed for dirty UA with dysuria, Ucx with multiple isolated but no dominant organisms  - [facility] medicine recs for management of multiple comorbidities, PO Lasix resumed 2/14/23  - ASA/Plavix, LVX/SCD, ambulation/IS PPX  - PT/OT for postoperative mobilization/ambulation  - WOCN c/s for fresh end colostomy care and repeat teaching  - Social work assisting in arranging home health upon discharge  - Dispo: ongoing, [facility]  - BOOST QID

## 2023-02-21 NOTE — ASSESSMENT & PLAN NOTE
Chronic mild hyponatremia on chart review, now moderate on presentation  Found to be in ADHF on arrival  BNP 2600, volume overloaded, JVD present, bilateral LE edema on consultation  CXR with mild pulmonary congestion  Reports poor diet at home (crackers/peanutbutter)  Reduced albumin likely 2/2 to malnutrition    Previously due to ADHF. Resolved but concern for secondary cause of hypovolemic hyponatremia as patient has had poor PO intake 2/2 abdominal pain    - strict I/Os  - daily CMP  - replace electrolytes

## 2023-02-21 NOTE — SUBJECTIVE & OBJECTIVE
Interval History: No further episodes of chest pain but ongoing hypotension.     Review of Systems   Constitutional:  Negative for chills and fever.   HENT:  Negative for sinus pain.    Eyes:  Negative for pain and visual disturbance.   Respiratory:  Negative for cough and shortness of breath.    Cardiovascular:  Negative for chest pain, palpitations and leg swelling.   Gastrointestinal:  Negative for blood in stool, diarrhea and vomiting.   Genitourinary:  Negative for difficulty urinating, dysuria and hematuria.   Neurological:  Positive for dizziness, weakness and light-headedness.   Objective:     Vital Signs (Most Recent):  Temp: 97.9 °F (36.6 °C) (02/21/23 1156)  Pulse: 86 (02/21/23 1156)  Resp: 18 (02/21/23 1210)  BP: (!) 86/48 (02/21/23 1156)  SpO2: 96 % (02/21/23 1156)   Vital Signs (24h Range):  Temp:  [97.3 °F (36.3 °C)-98.6 °F (37 °C)] 97.9 °F (36.6 °C)  Pulse:  [79-95] 86  Resp:  [16-20] 18  SpO2:  [95 %-97 %] 96 %  BP: ()/(43-56) 86/48     Weight: 54.9 kg (121 lb)  Body mass index is 21.43 kg/m².    Intake/Output Summary (Last 24 hours) at 2/21/2023 1305  Last data filed at 2/21/2023 1140  Gross per 24 hour   Intake 1050 ml   Output 530 ml   Net 520 ml      Physical Exam  Vitals and nursing note reviewed.   Constitutional:       General: She is not in acute distress.     Appearance: She is ill-appearing.   HENT:      Head: Normocephalic and atraumatic.      Right Ear: External ear normal.      Left Ear: External ear normal.      Nose: No congestion or rhinorrhea.   Eyes:      General:         Right eye: No discharge.         Left eye: No discharge.      Extraocular Movements: Extraocular movements intact.      Pupils: Pupils are equal, round, and reactive to light.   Cardiovascular:      Rate and Rhythm: Normal rate and regular rhythm.      Heart sounds: Normal heart sounds. No murmur heard.  Pulmonary:      Effort: No respiratory distress.      Breath sounds: No wheezing or rales (bibasilar).    Abdominal:      General: There is no distension.      Comments: Colostomy, vertical/midline abdominal excision, bandage clean/dry   Musculoskeletal:         General: No tenderness.      Cervical back: Normal range of motion. No rigidity.      Right lower leg: No edema.      Left lower leg: No edema.   Skin:     Findings: Bruising present. No erythema.   Neurological:      General: No focal deficit present.      Mental Status: She is alert and oriented to person, place, and time.   Psychiatric:         Mood and Affect: Mood normal.         Behavior: Behavior normal.       Significant Labs: All pertinent labs within the past 24 hours have been reviewed.    Significant Imaging: I have reviewed all pertinent imaging results/findings within the past 24 hours.

## 2023-02-22 PROBLEM — R31.0 GROSS HEMATURIA: Status: ACTIVE | Noted: 2023-01-01

## 2023-02-22 NOTE — SUBJECTIVE & OBJECTIVE
Past Medical History:   Diagnosis Date    Acute coronary syndrome     Acute exacerbation of chronic obstructive pulmonary disease (COPD) 3/23/2022    Allergy     Arthritis     Cardiomyopathy     CHF (congestive heart failure)     Coronary artery disease     Coronary artery disease of native artery of native heart with stable angina pectoris 2021: [facility]: Cath: LAD: Proximal stent patent. LCX: Proximal 80%. LCX: Dominant. Moderate disease. LCX: HEVER 2.75 x 18 mm. Distal dissection. HEVER 2.75 x 22 mm.     Diverticulitis     Diverticulosis     Familial hypercholesterolemia 2022    Former smoker 2022    Heart attack     Heart disease     History of myocardial infarction 2022    Hyperlipidemia     Hypertension     Hypertension 2022    ICD (implantable cardioverter-defibrillator) in place     Non-ST elevation myocardial infarction (NSTEMI) 2019       Past Surgical History:   Procedure Laterality Date    APPENDECTOMY N/A 2022    Procedure: APPENDECTOMY;  Surgeon: Rafa Cradozo MD;  [facility];  Service: Colon and Rectal;  Laterality: N/A;    ATRIAL CARDIAC PACEMAKER INSERTION      CECECTOMY N/A 2022    Procedure: EXCISION, CECUM;  Surgeon: Rafa Cardozo MD;  Location: [facility] OR 2ND FLR;  Service: Colon and Rectal;  Laterality: N/A;     SECTION      CHOLECYSTECTOMY N/A 2022    Procedure: CHOLECYSTECTOMY;  Surgeon: Doug Epstein MD;  Location: [facility] OR 2ND FLR;  Service: General;  Laterality: N/A;    COLONOSCOPY N/A 2022    Procedure: COLONOSCOPY;  Surgeon: Rafa Cardozo MD;  Location: [facility] OR 2ND FLR;  Service: Colon and Rectal;  Laterality: N/A;    COLOSTOMY  2022    Procedure: CREATION, COLOSTOMY;  Surgeon: Rafa Cardozo MD;  Location: [facility] OR 2ND FLR;  Service: Colon and Rectal;;    CORONARY STENT PLACEMENT      CYSTOSCOPY W/ URETERAL STENT PLACEMENT Right 2023    Procedure: CYSTOSCOPY, WITH URETERAL STENT  INSERTION;  Surgeon: Felice Laureano MD;  Location: [facility] OR [facility] FLR;  Service: Urology;  Laterality: Right;  6X24 stent    DEBRIDEMENT, ABDOMEN N/A 2/6/2023    Procedure: DEBRIDEMENT, ABDOMEN;  Surgeon: Lucio Sanchez DO;  Location: [facility] OR [facility] FLR;  Service: Plastics;  Laterality: N/A;  abdominal wall, and fascia.    INSERTION OF URETERAL CATHETER Left 2/6/2023    Procedure: INSERTION, CATHETER, URETER, BILATERAL;  Surgeon: Felice Laureano MD;  [facility];  Service: Urology;  Laterality: Left;    LEFT HEART CATHETERIZATION Left 1/24/2022    Procedure: CATHETERIZATION, HEART, LEFT;  Surgeon: Yohannes Cordova MD;  Location: [facility] CATH LAB;  Service: Cardiology;  Laterality: Left;    LYSIS OF ADHESIONS N/A 2/6/2023    Procedure: LYSIS, ADHESIONS;  Surgeon: Rafa Cardozo MD;  Location: [facility] OR [facility] FLR;  Service: Colon and Rectal;  Laterality: N/A;    REVISION COLOSTOMY N/A 2/6/2023    Procedure: REVISION, COLOSTOMY;  Surgeon: Rafa Cardozo MD;  [facility];  Service: Colon and Rectal;  Laterality: N/A;    SUBTOTAL COLECTOMY  2/6/2023    Procedure: COLECTOMY, PARTIAL;  Surgeon: Rafa Cardozo MD;  [facility];  Service: Colon and Rectal;;       Review of patient's allergies indicates:   Allergen Reactions    Augmentin [amoxicillin-pot clavulanate] Swelling     Not allergic to amoxicillin just a derivative of augmentin    Lisinopril Other (See Comments)     Fluid around heart    Shellfish containing products Anaphylaxis     Pt.states she is allergic to SEAFOOD since the age of 13    Levaquin [levofloxacin] Other (See Comments)     Very bad joint pain    Clindamycin Palpitations     Chest pain       Current Facility-Administered Medications   Medication    acetaminophen tablet 1,000 mg    albuterol inhaler 2 puff    ALPRAZolam tablet 0.5 mg    aspirin EC tablet 81 mg    balsam peru-castor oiL Oint    clopidogreL tablet 75 mg    dextrose 10% bolus 125  mL 125 mL    dextrose 10% bolus 250 mL 250 mL    ezetimibe tablet 10 mg    famotidine tablet 20 mg    fluticasone propionate 50 mcg/actuation nasal spray 50 mcg    furosemide tablet 20 mg    magnesium sulfate 2g in water 50mL IVPB (premix)    metoprolol succinate (TOPROL-XL) 24 hr tablet 25 mg    naloxone 0.4 mg/mL injection 0.02 mg    nitroGLYCERIN SL tablet 0.4 mg    nystatin 100,000 unit/mL suspension 500,000 Units    ondansetron injection 4 mg    oxyCODONE immediate release tablet 5 mg    oxyCODONE immediate release tablet Tab 10 mg    polyethylene glycol packet 17 g    prochlorperazine injection Soln 5 mg    simethicone chewable tablet 80 mg    sodium chloride 0.9% flush 10 mL    sodium chloride 0.9% flush 10 mL     Family History       Problem Relation (Age of Onset)    Emphysema Father    Heart attack Brother    Heart disease Mother          Tobacco Use    Smoking status: Former     Packs/day: 0.50     Types: Cigarettes     Start date: 2/4/1976     Quit date: 12/4/2012     Years since quitting: 10.2    Smokeless tobacco: Never   Substance and Sexual Activity    Alcohol use: No    Drug use: No    Sexual activity: Not on file     Review of Systems   Constitutional:  Positive for fatigue.   Gastrointestinal:  Positive for abdominal pain.   Objective:     Vital Signs (Most Recent):  Temp: 98.2 °F (36.8 °C) (02/22/23 1150)  Pulse: 98 (02/22/23 1150)  Resp: 20 (02/22/23 1150)  BP: (!) 106/55 (02/22/23 1150)  SpO2: 95 % (02/22/23 1150)   Vital Signs (24h Range):  Temp:  [98 °F (36.7 °C)-99 °F (37.2 °C)] 98.2 °F (36.8 °C)  Pulse:  [82-98] 98  Resp:  [15-20] 20  SpO2:  [93 %-98 %] 95 %  BP: ()/(49-56) 106/55     No data found.  Body mass index is 21.43 kg/m².      Intake/Output Summary (Last 24 hours) at 2/22/2023 1232  Last data filed at 2/22/2023 1154  Gross per 24 hour   Intake 640 ml   Output 550 ml   Net 90 ml       Physical Exam  Constitutional:       Appearance: She is ill-appearing.      Comments: Frail  appearing    HENT:      Head: Normocephalic and atraumatic.   Cardiovascular:      Rate and Rhythm: Normal rate.   Pulmonary:      Comments: Crackles noted in bases  Abdominal:      Tenderness: There is abdominal tenderness.      Comments: Multiple dressing on abd wall, colostomy bag   Musculoskeletal:      Right lower leg: No edema.      Left lower leg: No edema.   Neurological:      General: No focal deficit present.       Significant Labs:  CBC:  Recent Labs   Lab 02/18/23 0557 02/20/23 0226   WBC 4.99 4.70   RBC 4.48 4.08   HGB 10.5* 9.6*   HCT 35.1* 32.0*    405   MCV 78* 78*   MCH 23.4* 23.5*   MCHC 29.9* 30.0*     BNP:  Recent Labs   Lab 02/18/23  0557   *     CMP:  Recent Labs   Lab 02/20/23  0226 02/21/23  1318 02/22/23  0449    98 92   CALCIUM 8.8 8.8 8.9   * 132* 132*   K 4.0 4.4 4.5   CO2 31* 29 28   CL 93* 94* 95   BUN 11 13 13   CREATININE 0.6 0.7 0.7      Coagulation:   No results for input(s): PT, INR, APTT in the last 168 hours.  LDH:  No results for input(s): LDH in the last 72 hours.  Microbiology:  Microbiology Results (last 7 days)       Procedure Component Value Units Date/Time    Urine Culture High Risk [[id number]]     Order Status: Canceled Specimen: Urine             I have reviewed all pertinent labs within the past 24 hours.    Diagnostic Results:  I have reviewed all pertinent imaging results/findings within the past 24 hours.

## 2023-02-22 NOTE — ASSESSMENT & PLAN NOTE
Odette Hart is a 67 y.o. female with above comorbidities who underwent colovesical fistula repair on 02/06/23 as well as intraoperative placement of a right ureteral stent due to right hydronephrosis noted on pre-operative imaging.    - Hematuria likely secondary to stent  - PVR 12 cc, patient not in retention  - Will f/u CT scan today  - Stent in adequate position on CT from 02/10  - Outpatient f/u for further assessment of hydronephrosis  - Urology will arrange outpatient f/u  - No acute urologic intervention indicated  - Urology will sign off at this time

## 2023-02-22 NOTE — PLAN OF CARE
Met with patient to review discharge recommendation of home health and she is agreeable to plan - understands [facility] will assign agency - voiced preference for [facility] if possible - referral faxed to [facility] for agency assignment        02/22/23 9216   Post-Acute Status   Post-Acute Authorization Home Health   Home Health Status Pending Payor Review   Discharge Plan   Discharge Plan A [facility]

## 2023-02-22 NOTE — SUBJECTIVE & OBJECTIVE
Interval History: Patient developed hematuria overnight. Purewick canister noted to have thin red urine.  Patient reports intermittent sensation of incomplete emptying. Cr 0.7.       Objective:     Temp:  [98 °F (36.7 °C)-99 °F (37.2 °C)] 98.2 °F (36.8 °C)  Pulse:  [82-98] 98  Resp:  [15-20] 20  SpO2:  [93 %-98 %] 95 %  BP: ()/(49-56) 106/55     Body mass index is 21.43 kg/m².    Date 02/22/23 0700 - 02/23/23 0659   Shift 3851-7163 6213-4411 7429-0079 24 Hour Total   INTAKE   P.O. 240   240   Shift Total(mL/kg) 240(4.4)   240(4.4)   OUTPUT   Urine(mL/kg/hr) 100   100   Stool 50   50   Shift Total(mL/kg) 150(2.7)   150(2.7)   Weight (kg) 54.9 54.9 54.9 54.9     Bladder Scan Volume (mL): 12 mL (02/22/23 1042)  Post Void Cath Residual Output (mL): 100 mL (02/22/23 0935)    Drains       Drain  Duration                  Ileostomy 02/06/23 1118 Standard ([place], end) LUQ 16 days         Ureteral Drain/Stent 02/06/23 0813 Left ureter 5 Fr. 16 days                    Physical Exam  Constitutional:       General: She is not in acute distress.  HENT:      Mouth/Throat:      Mouth: Mucous membranes are moist.   Eyes:      Extraocular Movements: Extraocular movements intact.   Cardiovascular:      Rate and Rhythm: Normal rate.   Abdominal:      General: There is no distension.      Palpations: Abdomen is soft.      Comments: Midline incision dressing without strikethrough, colostomy with stool output.    Genitourinary:     Comments: Purewick draining thin red urine  Skin:     General: Skin is warm and dry.   Neurological:      Mental Status: She is alert and oriented to person, place, and time.   Psychiatric:         Mood and Affect: Mood normal.       Significant Labs:    BMP:  Recent Labs   Lab 02/20/23  0226 02/21/23  1318 02/22/23  0449   * 132* 132*   K 4.0 4.4 4.5   CL 93* 94* 95   CO2 31* 29 28   BUN 11 13 13   CREATININE 0.6 0.7 0.7   CALCIUM 8.8 8.8 8.9       CBC:   Recent Labs   Lab 02/18/23  0557  02/20/23  0226   WBC 4.99 4.70   HGB 10.5* 9.6*   HCT 35.1* 32.0*    405       Urine Studies:   Recent Labs   Lab 02/22/23  0134   COLORU Red*   APPEARANCEUA Ex.Turbid   PHUR 7.0   SPECGRAV 1.020   PROTEINUA 2+*   GLUCUA Negative   KETONESU Trace*   BILIRUBINUA Negative   OCCULTUA 3+*   NITRITE Negative   LEUKOCYTESUR 1+*   RBCUA >100*   WBCUA 0   BACTERIA None   HYALINECASTS 0       Significant Imaging:  All pertinent imaging results/findings from the past 24 hours have been reviewed.

## 2023-02-22 NOTE — PLAN OF CARE
Nutrition Plan of Care:    Recommendations     1) Continue regular diet   -Monitor tolerance  - Honor food preferences   2) Continue Boost Plus TID  3) RD following     Goals: meets % een/epn by next rd f/u  Nutrition Goal Status: progressing towards goal  Communication of RD Recs: other (comment) (POC)     Assessment and Plan     Nutrition Problem  Increased nutritional needs     Related to (etiology):   Condition associated with diagnoses     Signs and Symptoms (as evidenced by):   Wounds, GI intolerance     Interventions   Collaboration with other providers  [employer]     Nutrition Diagnosis Status:   Continues     Suri Rivers, MS, RDN, LDN

## 2023-02-22 NOTE — ASSESSMENT & PLAN NOTE
Cardiology consulted for management of ADHF, now signed off  Recent echo showed EF of 10%  Patient hypervolemic on presentation, appears more euvolemic today  Home GDMT: Toprol-XL 25, losartan 25 mg.  Initial CXR without new detrimental changes since prior, BNP 2600 on arrival  1x IV lasix on 2/14    Recommendations:   - HTS and CTS evaluated patient with recommendations against any advanced options given age and debility. Will continue to attempt GDMT titration as tolerated currently only on BB therapy  - continue lasix 20 mg daily PO (half of home dose 40)  - replete Mag>2, K>4  - Toprol-XL 25 mg increased with hold parameters  - DC losartan 12.5 mg qd given hypotension   - avoid IV anti-hypertensive  - holding home spironolactone, unable to tolerate full HFrEF GDMT

## 2023-02-22 NOTE — HPI
67 year old female with PMH of ICM/HFrEF (EF 10%) s/p PCI to LAD (2012)/LCX (1/2022), debility, malnutrition, DM, HTN, HLD, very complex abd surgical history who presented for olocutaneous fistula repair with abdominal wall reconstruction. On admit noted to be volume overload, diuresed well. Underwent surgery on 2/6. [facility] consulted for advanced options candidacy and GDMT management. She has had numerous admissions over the past year for abd issues during which she was noted to be volume overload and diuresed. During her current hospital course, she has been unable to tolerate low dose GDMT due to hypotension, currently on low dose toprol only.     She appears older than stated age. Reports she moslty uses walker at home, denies wheelchair use, prealbumin of 7.

## 2023-02-22 NOTE — CONSULTS
[facility] - [facility]  Cardiothoracic Surgery  Consult Note    Patient Name: Odette Hart  MRN: [id number]  Admission Date: 1/31/2023  Attending Physician: Rafa Cardozo MD  Referring Provider: Rafa Cardozo MD    Patient information was obtained from past medical records and ER records.     Consults  Subjective:     Principal Problem: Colocutaneous fistula    History of Present Illness: 68 yo F with HTN, HLD (statin intolerance), DM, CAD s/p PCI to LAD (2012)/LCX (1/2022), HFrEF (EF10%), debility, severe malnutrition, and the following procedures performed 5/2022: cholecystectomy, takedown of colovesical and colovaginal fistula, sigmoid colectomy, partial cecectomy, end colostomy who presents for colocutaneous fistula repair with abdominal wall reconstruction. On admission she was found to be hyponatremic and in acute on chronic decompensated heart failure. Cardiology was consulted to manage her HF and have since signed off as her volume status has become more euvolemic following diuresis. Medicine was consulted for pr-eop evaluation and continued management of her hyponatremia. Per chart review patient is chronically, mildly hyponatremic at baseline. She takes Lasix, Toprol, spironolactone, and losartan for her HFrEF. She reports correct use of her home medications. She states her diet consists of peanut butter and crackers. Denies CP/SOB/nausea vomiting, cough fever chills, polydipsia. Her Na has been improving with diuresis. Patient has multiple risk factors that complicate surgery: acute on chronic decompensated HF, CAD with PCIx2, hypertension, hyperlipidemia, malnutrition. This being said she does not have reported/documented/observed dysfunction of liver, kidney, lung, clotting.     On examination, she is chronically ill appearing/in no acute distress/at mental baseline. AF, /58 (78), sat 98% on RA, no leukocytosis, hgb 11.8, Hct 38.5, Plts 237, moderate Hyponatremia Na 124 (initially 122  on admission) with labile Baseline, K 3.1, Cl 85, Bun 13, Cr 00.8, Ca 7.9, albumin 2-3. BNP 2600, UOP 2300 on lasix 80mg     CXR with mild pulmonary congestion        Overview/Hospital Course:  Admitted for planned colocutaneous fistula repair, found to be hyponatremic and in ADHF. Started on IV diuresis, >6 L UOP, returned to euvolemia. Urine/serum studies consistent with hypervolemic hyponatremia most likely in 2/2 to acute on chronic HF. She does have multiple factors that maker her high risk for surgery, primarily her severrely reduced LVEF 10%. Cardiology and medicine consulted for pre-operative evaluation. Agree she is high risk, but need of her procedure outweigh the risks. 2/6 Patient tolerated colocutaneous takedown/reconstruction well. Her Hyponatremia has resolved with Lasix 80mg BID. S/p colocutaneous fistula revision with intra-op R ureteral stent placement for hydronephrosis. Developed post-op ileus. Given lasix holiday for over diuresis. Plavix, ASA restarted post-op. Anticipate discharge on prior home PO lasix dosing.      Complaint of dysuria and dark cloudy urine noted by primary team, patient started on cipro then transitioned to cefpodoxime (broader coverage) given culture results of multiple organisms none in predominance (thus an unequivocal UCx).  Recently discontinued from PCA pump with PRN pain management of oxy 5 and 10 mg. Restarted lasix 20 mg on 2/14/23. CRS monitoring surgical site closely, some output of serous fluid, no signs of cellulitis. Continued symptomatic hypotension despite discontinuing GDMT.     [facility] asked CTS team to review patient regarding advanced heart failure options.       Scheduled Meds:   aspirin  81 mg Oral Daily    balsam peru-castor oiL   Topical (Top) BID    clopidogreL  75 mg Oral Daily    ezetimibe  10 mg Oral Daily    famotidine  20 mg Oral BID    furosemide  20 mg Oral Daily    magnesium sulfate IVPB  2 g Intravenous Q2H    metoprolol succinate  25 mg  Oral Daily    nystatin  500,000 Units Oral QID    polyethylene glycol  17 g Oral Daily     Continuous Infusions:  PRN Meds:.[COMPLETED] acetaminophen **FOLLOWED BY** acetaminophen, albuterol, ALPRAZolam, dextrose 10%, dextrose 10%, fluticasone propionate, naloxone, nitroGLYCERIN, ondansetron, oxyCODONE, oxyCODONE, prochlorperazine, simethicone, sodium chloride 0.9%, sodium chloride 0.9%      Current Outpatient Medications on File Prior to Encounter   Medication Sig    aspirin (ECOTRIN) 81 MG EC tablet Take 1 tablet (81 mg total) by mouth once daily.    clopidogreL (PLAVIX) 75 mg tablet Take 1 tablet (75 mg total) by mouth once daily.    ezetimibe (ZETIA) 10 mg tablet Take 1 tablet (10 mg total) by mouth once daily.    acetaminophen (TYLENOL) 500 MG tablet Take 2 tablets (1,000 mg total) by mouth every 8 (eight) hours as needed for Pain.    albuterol (PROVENTIL/VENTOLIN HFA) 90 mcg/actuation inhaler Inhale 2 puffs into the lungs every 6 (six) hours as needed for Wheezing. Rescue (Patient not taking: Reported on 1/25/2023)    ALPRAZolam (XANAX) 0.5 MG tablet Take 1 tablet (0.5 mg total) by mouth nightly as needed for Anxiety.    docusate sodium (COLACE) 100 MG capsule Take 1 capsule (100 mg total) by mouth 2 (two) times daily.    empagliflozin (JARDIANCE) 10 mg tablet Take 1 tablet (10 mg total) by mouth once daily.    famotidine (PEPCID) 20 MG tablet Take 1 tablet (20 mg total) by mouth once daily.    fluticasone propionate (FLONASE) 50 mcg/actuation nasal spray 1 spray (50 mcg total) by Each Nostril route once daily.    food supplemt, lactose-reduced (ENSURE HIGH PROTEIN) Liqd Take 240 mLs by mouth 2 (two) times a day.    furosemide (LASIX) 40 MG tablet Take 1 tablet (40 mg total) by mouth once daily.    HYDROcodone-acetaminophen (NORCO) 5-325 mg per tablet Take 1 tablet by mouth every 8 (eight) hours as needed for Pain.    losartan (COZAAR) 25 MG tablet Take 1 tablet (25 mg total) by mouth once  daily.    metoprolol succinate (TOPROL-XL) 25 MG 24 hr tablet Take 1 tablet (25 mg total) by mouth once daily.    nitroGLYCERIN (NITROSTAT) 0.4 MG SL tablet Place 1 tablet (0.4 mg total) under the tongue every 5 (five) minutes as needed for Chest pain. (Patient not taking: Reported on 1/27/2023)    ondansetron (ZOFRAN-ODT) 8 MG TbDL Take 1 tablet (8 mg total) by mouth every 12 (twelve) hours as needed (nausea).    simethicone (MYLICON) 80 MG chewable tablet Take 1 tablet (80 mg total) by mouth every 8 (eight) hours as needed for Flatulence.    spironolactone (ALDACTONE) 25 MG tablet Take 0.5 tablets (12.5 mg total) by mouth once daily.    [DISCONTINUED] pantoprazole (PROTONIX) 40 MG tablet Take 1 tablet (40 mg total) by mouth once daily.    [DISCONTINUED] prasugreL (EFFIENT) 10 mg Tab Take 1 tablet (10 mg total) by mouth once daily.       Review of patient's allergies indicates:   Allergen Reactions    Augmentin [amoxicillin-pot clavulanate] Swelling     Not allergic to amoxicillin just a derivative of augmentin    Lisinopril Other (See Comments)     Fluid around heart    Shellfish containing products Anaphylaxis     Pt.states she is allergic to SEAFOOD since the age of 13    Levaquin [levofloxacin] Other (See Comments)     Very bad joint pain    Clindamycin Palpitations     Chest pain       Past Medical History:   Diagnosis Date    Acute coronary syndrome     Acute exacerbation of chronic obstructive pulmonary disease (COPD) 3/23/2022    Allergy     Arthritis     Cardiomyopathy     CHF (congestive heart failure)     Coronary artery disease     Coronary artery disease of native artery of native heart with stable angina pectoris 4/19/2021 1/24/2022: [facility]: Cath: LAD: Proximal stent patent. LCX: Proximal 80%. LCX: Dominant. Moderate disease. LCX: HEVER 2.75 x 18 mm. Distal dissection. HEVER 2.75 x 22 mm.     Diverticulitis     Diverticulosis     Familial hypercholesterolemia 1/22/2022    Former  smoker 2022    Heart attack     Heart disease     History of myocardial infarction 2022    Hyperlipidemia     Hypertension     Hypertension 2022    ICD (implantable cardioverter-defibrillator) in place     Non-ST elevation myocardial infarction (NSTEMI) 2019     Past Surgical History:   Procedure Laterality Date    APPENDECTOMY N/A 2022    Procedure: APPENDECTOMY;  Surgeon: Rafa Cardozo MD;  [facility];  Service: Colon and Rectal;  Laterality: N/A;    ATRIAL CARDIAC PACEMAKER INSERTION      CECECTOMY N/A 2022    Procedure: EXCISION, CECUM;  Surgeon: Rafa Cardozo MD;  Location: [facility] OR [facility] FLR;  Service: Colon and Rectal;  Laterality: N/A;     SECTION      CHOLECYSTECTOMY N/A 2022    Procedure: CHOLECYSTECTOMY;  Surgeon: Doug Epstein MD;  [facility];  Service: General;  Laterality: N/A;    COLONOSCOPY N/A 2022    Procedure: COLONOSCOPY;  Surgeon: Rafa Cardozo MD;  Location: [facility] OR [facility] FLR;  Service: Colon and Rectal;  Laterality: N/A;    COLOSTOMY  2022    Procedure: CREATION, COLOSTOMY;  Surgeon: Rafa Cardozo MD;  [facility];  Service: Colon and Rectal;;    CORONARY STENT PLACEMENT      CYSTOSCOPY W/ URETERAL STENT PLACEMENT Right 2023    Procedure: CYSTOSCOPY, WITH URETERAL STENT INSERTION;  Surgeon: Felice Laureano MD;  Location: [facility] OR [address] [place] [address];  Service: Urology;  Laterality: Right;  6X24 stent    DEBRIDEMENT, ABDOMEN N/A 2023    Procedure: DEBRIDEMENT, ABDOMEN;  Surgeon: Lucio Sanchez DO;  [facility];  Service: Plastics;  Laterality: N/A;  abdominal wall, and fascia.    INSERTION OF URETERAL CATHETER Left 2023    Procedure: INSERTION, CATHETER, URETER, BILATERAL;  Surgeon: Felice Laureano MD;  Location: [facility] OR [address] [place] [address];  Service: Urology;  Laterality: Left;    LEFT HEART CATHETERIZATION Left 2022    Procedure:  CATHETERIZATION, HEART, LEFT;  Surgeon: Yohannes Cordova MD;  [facility]: [facility] [place] C[facility] LAB;  Service: Cardiology;  Laterality: Left;    LYSIS OF ADHESIONS N/A 2/6/2023    Procedure: LYSIS, ADHESIONS;  Surgeon: Rafa Cardozo MD;  Location: [facility] OR 2ND FLR;  Service: Colon and Rectal;  Laterality: N/A;    REVISION COLOSTOMY N/A 2/6/2023    Procedure: REVISION, COLOSTOMY;  Surgeon: Rafa Cardozo MD;  Location: [facility] OR [facility] FLR;  Service: Colon and Rectal;  Laterality: N/A;    SUBTOTAL COLECTOMY  2/6/2023    Procedure: COLECTOMY, PARTIAL;  Surgeon: Rafa Cardozo MD;  Location: [facility] OR 2ND FLR;  Service: Colon and Rectal;;     Family History       Problem Relation (Age of Onset)    Emphysema Father    Heart attack Brother    Heart disease Mother          Tobacco Use    Smoking status: Former     Packs/day: 0.50     Types: Cigarettes     Start date: 2/4/1976     Quit date: 12/4/2012     Years since quitting: 10.2    Smokeless tobacco: Never   Substance and Sexual Activity    Alcohol use: No    Drug use: No    Sexual activity: Not on file     Review of Systems  Objective:     Vital Signs (Most Recent):  Temp: 98.2 °F (36.8 °C) (02/22/23 1150)  Pulse: 98 (02/22/23 1150)  Resp: 20 (02/22/23 1150)  BP: (!) 106/55 (02/22/23 1150)  SpO2: 95 % (02/22/23 1150)   Vital Signs (24h Range):  Temp:  [98 °F (36.7 °C)-99 °F (37.2 °C)] 98.2 °F (36.8 °C)  Pulse:  [82-98] 98  Resp:  [15-20] 20  SpO2:  [93 %-98 %] 95 %  BP: ()/(49-56) 106/55     Weight: 54.9 kg (121 lb)  Body mass index is 21.43 kg/m².    SpO2: 95 %        Intake/Output - Last 3 Shifts         02/20 0700  02/21 0659 02/21 0700  02/22 0659 02/22 0700 02/23 0659    P.O. 650 950 480    I.V. (mL/kg)       Total Intake(mL/kg) 650 (11.8) 950 (17.3) 480 (8.7)    Urine (mL/kg/hr) 530 (0.4) 400 (0.3) 100 (0.3)    Other       Stool 0 100 50    Total Output 530 500 150    Net +120 +450 +330           Urine Occurrence  300 x     Stool Occurrence 2 x 1 x 1 x              Lines/Drains/Airways       Drain  Duration                  Ileostomy 02/06/23 1118 Standard ([place], end) LUQ 16 days         Ureteral Drain/Stent 02/06/23 0813 Left ureter 5 Fr. 16 days              Peripheral Intravenous Line  Duration                  Peripheral IV - Single Lumen 02/17/23 1000 20 G Anterior;Right Forearm 5 days                     STS Risk Score: na    Physical Exam    Significant Labs:  ABGs: No results for input(s): PH, PCO2, PO2, HCO3, POCSATURATED, BE in the last 48 hours.  Amylase: No results for input(s): AMYLASE in the last 48 hours.  BMP:   Recent Labs   Lab 02/22/23  0449   GLU 92   *   K 4.5   CL 95   CO2 28   BUN 13   CREATININE 0.7   CALCIUM 8.9   MG 1.7     Cardiac markers: No results for input(s): CKMB, CPKMB, TROPONINT, TROPONINI, MYOGLOBIN in the last 48 hours.  CBC: No results for input(s): WBC, RBC, HGB, HCT, PLT, MCV, MCH, MCHC in the last 48 hours.  CMP:   Recent Labs   Lab 02/22/23  0449   GLU 92   CALCIUM 8.9   *   K 4.5   CO2 28   CL 95   BUN 13   CREATININE 0.7     Coagulation: No results for input(s): PT, INR, APTT in the last 48 hours.  Lactic Acid: No results for input(s): LACTATE in the last 48 hours.  LFTs: No results for input(s): ALT, AST, ALKPHOS, BILITOT, PROT, ALBUMIN in the last 48 hours.  Lipase: No results for input(s): LIPASE in the last 48 hours.    Significant Diagnostics: reviewed   TTE 12/23/22   The left ventricle is severely enlarged with eccentric hypertrophy and severely decreased systolic function. The estimated ejection fraction is 10%.   Normal right ventricular size with mildly reduced right ventricular systolic function.   Grade II left ventricular diastolic dysfunction.   Biatrial enlargement.   The estimated PA systolic pressure is 51 mmHg.   Normal central venous pressure (3 mmHg).   LV 6.95   TAPSE 1.3    CT A/P 2/10/23  1. Findings suggesting a small bowel to rectal fistula.  Recommend surgical consultation  and follow-up.  2. Mild complex fluid along the right anterolateral abdominal wall between the colon and abdominal wall with possible minimal contrast extravasation could represent large bowel perforation.  See above comments.  Recommend surgical follow-up.  3. 5.9 cm structure in the right pelvis which is indeterminate.  This could represent a complex collection or possibly the unopacified cecum.  See above comments.  Follow-up recommended.  4. Bibasilar small pleural effusions with associated consolidation and atelectasis.  Follow-up recommended.  5. Mild splenomegaly with findings suspicious for splenic ischemia/infarction in the superior portion.  See above comments.  Follow-up recommended.  6. Mild hydronephrosis on the right with right ureteral stent present.  7. Significant pneumoperitoneum.  This could be associated with recent surgery and or bowel perforation.  Surgical consultation and follow-up recommended.  8. Cardiomegaly.    Assessment/Plan:     NYHA Score: NYHA III: marked limitation of physical activity, comfortable at rest    Ischemic cardiomyopathy  66 yo F with HTN, HLD (statin intolerance), DM, CAD s/p PCI to LAD (2012)/LCX (1/2022), HFrEF (EF10%), debility, severe malnutrition, and the following procedures performed 5/2022: cholecystectomy, takedown of colovesical and colovaginal fistula, sigmoid colectomy, partial cecectomy, end colostomy who presents for colocutaneous fistula repair with abdominal wall reconstruction. Patient was supposed to follow up with HTS in the outpatient setting but they were consulted while here. CTS team asked to look into the patient's case regarding advanced heart failure options. Patient with an echo from December with EF 10%, LV 6.95, TAPSE 1.3. Patient is debilitated and ambulates with a walker. Also severely malnourished, with pre-albumin 7. Having issues with hypotension. Hematuria since last stent/colo-rectal procedure. Urology will follow up outpatient. Blood  group O+ Likely not a candidate for advanced options. Dr. Steward to review and staff with final recommendations.         Thank you for your consult. I will follow-up with patient. Please contact us if you have any additional questions.    Elisabeth Sandra PA-C  Cardiothoracic Surgery  [facility]

## 2023-02-22 NOTE — SUBJECTIVE & OBJECTIVE
Interval History: Reported dysuria for which patient was unsure about onset. Gross hematuria on examination and discussion with urology given recent stent placement on 2/6. Repeat CT AP per CRS. Discussion with [facility] for further evaluation. Patient expressed frustration over continued complications and prolonged stay.    Review of Systems   Constitutional:  Negative for chills and fever.   HENT:  Negative for sinus pain.    Eyes:  Negative for pain and visual disturbance.   Respiratory:  Negative for cough and shortness of breath.    Cardiovascular:  Negative for chest pain, palpitations and leg swelling.   Gastrointestinal:  Negative for blood in stool, diarrhea and vomiting.   Genitourinary:  Positive for dysuria and hematuria. Negative for difficulty urinating.   Neurological:  Positive for dizziness, weakness and light-headedness.   Objective:     Vital Signs (Most Recent):  Temp: 98.7 °F (37.1 °C) (02/22/23 0747)  Pulse: 97 (02/22/23 0747)  Resp: 15 (02/22/23 0925)  BP: (!) 102/56 (02/22/23 0747)  SpO2: 98 % (02/22/23 0747)   Vital Signs (24h Range):  Temp:  [97.9 °F (36.6 °C)-99 °F (37.2 °C)] 98.7 °F (37.1 °C)  Pulse:  [82-97] 97  Resp:  [15-20] 15  SpO2:  [93 %-98 %] 98 %  BP: ()/(48-56) 102/56     Weight: 54.9 kg (121 lb)  Body mass index is 21.43 kg/m².    Intake/Output Summary (Last 24 hours) at 2/22/2023 0954  Last data filed at 2/22/2023 0745  Gross per 24 hour   Intake 1040 ml   Output 500 ml   Net 540 ml      Physical Exam  Vitals and nursing note reviewed.   Constitutional:       General: She is not in acute distress.     Appearance: She is ill-appearing.   HENT:      Head: Normocephalic and atraumatic.      Right Ear: External ear normal.      Left Ear: External ear normal.      Nose: No congestion or rhinorrhea.   Eyes:      General:         Right eye: No discharge.         Left eye: No discharge.      Extraocular Movements: Extraocular movements intact.      Pupils: Pupils are equal, round, and  reactive to light.   Cardiovascular:      Rate and Rhythm: Normal rate and regular rhythm.      Heart sounds: Normal heart sounds. No murmur heard.  Pulmonary:      Effort: No respiratory distress.      Breath sounds: No wheezing or rales (bibasilar).   Abdominal:      General: There is no distension.      Comments: Colostomy, vertical/midline abdominal excision, bandage clean/dry   Musculoskeletal:         General: No tenderness.      Cervical back: Normal range of motion. No rigidity.      Right lower leg: No edema.      Left lower leg: No edema.   Skin:     Findings: Bruising present. No erythema.   Neurological:      General: No focal deficit present.      Mental Status: She is alert and oriented to person, place, and time.   Psychiatric:         Mood and Affect: Mood normal.         Behavior: Behavior normal.       Significant Labs: All pertinent labs within the past 24 hours have been reviewed.  CBC: No results for input(s): WBC, HGB, HCT, PLT in the last 48 hours.  CMP:   Recent Labs   Lab 02/21/23  1318 02/22/23  0449   * 132*   K 4.4 4.5   CL 94* 95   CO2 29 28   GLU 98 92   BUN 13 13   CREATININE 0.7 0.7   CALCIUM 8.8 8.9   ANIONGAP 9 9     Urine Culture: No results for input(s): LABURIN in the last 48 hours.  Urine Studies:   Recent Labs   Lab 02/22/23  0134   COLORU Red*   APPEARANCEUA Ex.Turbid   PHUR 7.0   SPECGRAV 1.020   PROTEINUA 2+*   GLUCUA Negative   KETONESU Trace*   BILIRUBINUA Negative   OCCULTUA 3+*   NITRITE Negative   LEUKOCYTESUR 1+*   RBCUA >100*   WBCUA 0   BACTERIA None   HYALINECASTS 0       Significant Imaging: I have reviewed all pertinent imaging results/findings within the past 24 hours.

## 2023-02-22 NOTE — PROGRESS NOTES
[facility] Medicine  Progress Note    Patient Name: Odette Hart  MRN: [id number]  Patient Class: IP- Inpatient   Admission Date: 1/31/2023  Length of Stay: 22 days  Attending Physician: Rafa Cardozo MD  Primary Care Provider: Braxton Barragan MD        Subjective:     Principal Problem:Colocutaneous fistula        HPI:  68 yo F with HTN, HLD (statin intolerance), DM, CAD s/p PCI to LAD (2012)/LCX (1/2022), HFrEF (EF10%), debility, severe malnutrition, and the following procedures performed 5/2022: cholecystectomy, takedown of colovesical and colovaginal fistula, sigmoid colectomy, partial cecectomy, end colostomy who presents for colocutaneous fistula repair with abdominal wall reconstruction. On admission she was found to be hyponatremic and in acute on chronic decompensated heart failure. Cardiology was consulted to manage her HF and have since signed off as her volume status has become more euvolemic following diuresis. Medicine was consulted for pr-eop evaluation and continued management of her hyponatremia. Per chart review patient is chronically, mildly hyponatremic at baseline. She takes Lasix, Toprol, spironolactone, and losartan for her HFrEF. She reports correct use of her home medications. She states her diet consists of peanut butter and crackers. Denies CP/SOB/nausea vomiting, cough fever chills, polydipsia. Her Na has been improving with diuresis. Patient has multiple risk factors that complicate surgery: acute on chronic decompensated HF, CAD with PCIx2, hypertension, hyperlipidemia, malnutrition. This being said she does not have reported/documented/observed dysfunction of liver, kidney, lung, clotting.    On examination, she is chronically ill appearing/in no acute distress/at mental baseline. AF, /58 (78), sat 98% on RA, no leukocytosis, hgb 11.8, Hct 38.5, Plts 237, moderate Hyponatremia Na 124 (initially 122 on admission) with labile Baseline, K 3.1, Cl 85, Bun 13,  Cr 00.8, Ca 7.9, albumin 2-3. BNP 2600, UOP 2300 on lasix 80mg    CXR with mild pulmonary congestion      Overview/Hospital Course:  Admitted for planned colocutaneous fistula repair, found to be hyponatremic and in ADHF. Started on IV diuresis, >6 L UOP, returned to euvolemia. Urine/serum studies consistent with hypervolemic hyponatremia most likely in 2/2 to acute on chronic HF. She does have multiple factors that maker her high risk for surgery, primarily her severrely reduced LVEF 10%. Cardiology and medicine consulted for pre-operative evaluation. Agree she is high risk, but need of her procedure outweigh the risks. 2/6 Patient tolerated colocutaneous takedown/reconstruction well. Her Hyponatremia has resolved with Lasix 80mg BID. S/p colocutaneous fistula revision with intra-op R ureteral stent placement for hydronephrosis. Developed post-op ileus. Given lasix holiday for over diuresis. Plavix, ASA restarted post-op. Anticipate discharge on prior home PO lasix dosing.     Complaint of dysuria and dark cloudy urine noted by primary team, patient started on cipro then transitioned to cefpodoxime (broader coverage) given culture results of multiple organisms none in predominance (thus an unequivocal UCx).  Recently discontinued from PCA pump with PRN pain management of oxy 5 and 10 mg. Restarted lasix 20 mg on 2/14/23. CRS monitoring surgical site closely, some output of serous fluid, no signs of cellulitis. Continued symptomatic hypotension despite discontinuing GDMT. [facility] [place] for further evaluation and euvolemic on examination.     Patient with new onset hematuria and reported dysuira. Call to lab concerning UA completed which demonstrated WBC clumps and no WBC was addend to show just hematuria. Urology was contacted given recent stent placement and plan to evaluate patient. Repeat CT ordered by CRS.       Interval History: Reported dysuria for which patient was unsure about onset. Gross hematuria on  examination and discussion with urology given recent stent placement on 2/6. Repeat CT AP per CRS. Discussion with [facility] for further evaluation. Patient expressed frustration over continued complications and prolonged stay.    Review of Systems   Constitutional:  Negative for chills and fever.   HENT:  Negative for sinus pain.    Eyes:  Negative for pain and visual disturbance.   Respiratory:  Negative for cough and shortness of breath.    Cardiovascular:  Negative for chest pain, palpitations and leg swelling.   Gastrointestinal:  Negative for blood in stool, diarrhea and vomiting.   Genitourinary:  Positive for dysuria and hematuria. Negative for difficulty urinating.   Neurological:  Positive for dizziness, weakness and light-headedness.   Objective:     Vital Signs (Most Recent):  Temp: 98.7 °F (37.1 °C) (02/22/23 0747)  Pulse: 97 (02/22/23 0747)  Resp: 15 (02/22/23 0925)  BP: (!) 102/56 (02/22/23 0747)  SpO2: 98 % (02/22/23 0747)   Vital Signs (24h Range):  Temp:  [97.9 °F (36.6 °C)-99 °F (37.2 °C)] 98.7 °F (37.1 °C)  Pulse:  [82-97] 97  Resp:  [15-20] 15  SpO2:  [93 %-98 %] 98 %  BP: ()/(48-56) 102/56     Weight: 54.9 kg (121 lb)  Body mass index is 21.43 kg/m².    Intake/Output Summary (Last 24 hours) at 2/22/2023 0954  Last data filed at 2/22/2023 0745  Gross per 24 hour   Intake 1040 ml   Output 500 ml   Net 540 ml      Physical Exam  Vitals and nursing note reviewed.   Constitutional:       General: She is not in acute distress.     Appearance: She is ill-appearing.   HENT:      Head: Normocephalic and atraumatic.      Right Ear: External ear normal.      Left Ear: External ear normal.      Nose: No congestion or rhinorrhea.   Eyes:      General:         Right eye: No discharge.         Left eye: No discharge.      Extraocular Movements: Extraocular movements intact.      Pupils: Pupils are equal, round, and reactive to light.   Cardiovascular:      Rate and Rhythm: Normal rate and regular rhythm.       Heart sounds: Normal heart sounds. No murmur heard.  Pulmonary:      Effort: No respiratory distress.      Breath sounds: No wheezing or rales (bibasilar).   Abdominal:      General: There is no distension.      Comments: Colostomy, vertical/midline abdominal excision, bandage clean/dry   Musculoskeletal:         General: No tenderness.      Cervical back: Normal range of motion. No rigidity.      Right lower leg: No edema.      Left lower leg: No edema.   Skin:     Findings: Bruising present. No erythema.   Neurological:      General: No focal deficit present.      Mental Status: She is alert and oriented to person, place, and time.   Psychiatric:         Mood and Affect: Mood normal.         Behavior: Behavior normal.       Significant Labs: All pertinent labs within the past 24 hours have been reviewed.  CBC: No results for input(s): WBC, HGB, HCT, PLT in the last 48 hours.  CMP:   Recent Labs   Lab 02/21/23  1318 02/22/23  0449   * 132*   K 4.4 4.5   CL 94* 95   CO2 29 28   GLU 98 92   BUN 13 13   CREATININE 0.7 0.7   CALCIUM 8.8 8.9   ANIONGAP 9 9     Urine Culture: No results for input(s): LABURIN in the last 48 hours.  Urine Studies:   Recent Labs   Lab 02/22/23  0134   COLORU Red*   APPEARANCEUA Ex.Turbid   PHUR 7.0   SPECGRAV 1.020   PROTEINUA 2+*   GLUCUA Negative   KETONESU Trace*   BILIRUBINUA Negative   OCCULTUA 3+*   NITRITE Negative   LEUKOCYTESUR 1+*   RBCUA >100*   WBCUA 0   BACTERIA None   HYALINECASTS 0       Significant Imaging: I have reviewed all pertinent imaging results/findings within the past 24 hours.      Assessment/Plan:      * Colocutaneous fistula  S/p colocutaneous fistula revision with intra-op R ureteral stent placement for hydronephrosis. Developed post-op ileus.   Required post-op PCA pump now discontinued and on PRN opioids    - oxy Q4 PRN and continue bowel regimen  - Continue Wound care  - per primary     Gross hematuria  Seen by urology and stated that hematuria 2/2  stent placement. No acute intervention but will await read from CT AP.     Continue to monitor and follow up UPCR for proteinuria on UA as well      Ileus  Patient has Post-operative ileus which is adynamic in etiology which is improving. This is inherent to her procedure/medications. Will treat conservatively with bowel rest, IV fluids, serial abdominal exams and avoidance of GI paralytics such as narcotics or anti-spasmodics. Monitor patient closely.     Diet advanced 2/12; resolved    Severe protein-calorie malnutrition  Nutrition consulted. Most recent weight and BMI monitored-                         Measurements:  Wt Readings from Last 1 Encounters:   02/14/23 54.9 kg (121 lb)   Body mass index is 21.43 kg/m².    Recommendations: Recommendation/Intervention: 1) Continue clear liquid   2) Continue Boost Plus & Boost Breeze  3) RD following  Goals: meets % een/epn by next rd f/u    Patient has been screened and assessed by RD. RD will follow patient.      Hyponatremia  Chronic mild hyponatremia on chart review, now moderate on presentation  Found to be in ADHF on arrival  BNP 2600, volume overloaded, JVD present, bilateral LE edema on consultation  CXR with mild pulmonary congestion  Reports poor diet at home (crackers/peanutbutter)  Reduced albumin likely 2/2 to malnutrition    Previously due to ADHF. Resolved but concern for secondary cause of hypovolemic hyponatremia as patient has had poor PO intake 2/2 abdominal pain    - strict I/Os  - daily CMP  - replace electrolytes        Hypertension  Now Hypotension.  Intermittent difficulties with hypotension while inpatient. Unable to tolerate full HFrEF GDMT.   Continued hypotension while admitted and symptomatic with ambulation. Will continue to titrate.     - Hold GDMT if SBP<95  - encourage PO intake     Hypercholesterolemia  Lipids well controlled  Has documented statin intolerance    - continue ezetimibe    Coronary artery disease  History of Cad with  PCIx2 LAD in 2012 and LCX 1/2022  Home regimen: DAPT    No further surgical intervention planned inpatient by CRS or urology.   Reported episode of chest pain 2/19 that was reproducible with palpation on examination and no reported episodes while patient ambulating and working with PT though LH and dizziness did occur. EKG and trop reassuring.    -  aspirin  - clopidogrel  -  BB as tolerated  - no statin 2/2 intolerance    Ischemic cardiomyopathy  Cardiology consulted for management of ADHF, now signed off  Recent echo showed EF of 10%  Patient hypervolemic on presentation, appears more euvolemic today  Home GDMT: Toprol-XL 25, losartan 25 mg.  Initial CXR without new detrimental changes since prior, BNP 2600 on arrival  1x IV lasix on 2/14    Recommendations:   - HTS and CTS evaluated patient with recommendations against any advanced options given age and debility. Will continue to attempt GDMT titration as tolerated currently only on BB therapy  - continue lasix 20 mg daily PO (half of home dose 40)  - replete Mag>2, K>4  - Toprol-XL 25 mg increased with hold parameters  - DC losartan 12.5 mg qd given hypotension   - avoid IV anti-hypertensive  - holding home spironolactone, unable to tolerate full HFrEF GDMT      VTE Risk Mitigation (From admission, onward)         Ordered     Place sequential compression device  Until discontinued         02/06/23 1447     IP VTE HIGH RISK PATIENT  Once         02/06/23 1447                Discharge Planning   SIVAN: 2/23/2023     Code Status: Full Code   Is the patient medically ready for discharge?:     Reason for patient still in hospital (select all that apply): Patient trending condition  Discharge Plan A: Home Health   Discharge Delays: (!) Diet Not Ready for Discharge              Ml Lagos DO  Department of Hospital Medicine   [facility]

## 2023-02-22 NOTE — PROGRESS NOTES
Patient Name: Odette Hart  Date: 2/22/2023  Service: Colon and Rectal Surgery    Subjective:  New gross hematuria overnight, UA with blood but without evidence of infection. Tolerating small vol low fiber diet without nausea/vomiting or belching/hiccups/heartburn; drinking ~3 Boost Plus daily. Passing soft/solid stool into bag with flatus, denies further episodes of rectal stool. Denies fevers/chills/sweats, lightheadedness/dizziness, chest pain/shortness of breath, upper or lower extremity edema/pain.     Medications:    Current Facility-Administered Medications:     [COMPLETED] acetaminophen 1,000 mg/100 mL (10 mg/mL) injection 1,000 mg, 1,000 mg, Intravenous, Q8H, Stopped at 02/07/23 0527 **FOLLOWED BY** acetaminophen tablet 1,000 mg, 1,000 mg, Oral, Q8H PRN, Keanu Lagos,     albuterol inhaler 2 puff, 2 puff, Inhalation, Q6H PRN, Pablo Ramirez MD    ALPRAZolam tablet 0.5 mg, 0.5 mg, Oral, Nightly PRN, Rafa Cardozo MD, 0.5 mg at 02/22/23 0203    aspirin EC tablet 81 mg, 81 mg, Oral, Daily, Pablo Ramirez MD, 81 mg at 02/22/23 0915    balsam peru-castor oiL Oint, , Topical (Top), BID, Pablo Ramirez MD, Given at 02/22/23 0921    clopidogreL tablet 75 mg, 75 mg, Oral, Daily, Pablo Ramirez MD, 75 mg at 02/22/23 0917    dextrose 10% bolus 125 mL 125 mL, 12.5 g, Intravenous, PRN, Pablo Ramirez MD    dextrose 10% bolus 250 mL 250 mL, 25 g, Intravenous, PRN, Pablo Ramirez MD    ezetimibe tablet 10 mg, 10 mg, Oral, Daily, Pablo Ramirez MD, 10 mg at 02/22/23 0915    famotidine tablet 20 mg, 20 mg, Oral, BID, Rafa Cardozo MD, 20 mg at 02/22/23 0917    fluticasone propionate 50 mcg/actuation nasal spray 50 mcg, 1 spray, Each Nostril, Daily PRN, Pablo Ramirez MD    furosemide tablet 20 mg, 20 mg, Oral, Daily, Keanu Lagos DO, 20 mg at 02/22/23 0915    magnesium sulfate 2g in water 50mL IVPB (premix), 2 g, Intravenous, Q2H, Pablo Ramirez MD, Last Rate: 25 mL/hr  at 02/22/23 1014, 2 g at 02/22/23 1014    metoprolol succinate (TOPROL-XL) 24 hr tablet 25 mg, 25 mg, Oral, Daily, Adia Poon MD, 25 mg at 02/22/23 0917    naloxone 0.4 mg/mL injection 0.02 mg, 0.02 mg, Intravenous, PRN, Pablo Ramirez MD    nitroGLYCERIN SL tablet 0.4 mg, 0.4 mg, Sublingual, Q5 Min PRN, Pablo Ramirez MD    nystatin 100,000 unit/mL suspension 500,000 Units, 500,000 Units, Oral, QID, Fahad Smith MD, 500,000 Units at 02/22/23 0915    ondansetron injection 4 mg, 4 mg, Intravenous, Q6H PRN, Pablo Ramirez MD, 4 mg at 02/20/23 1148    oxyCODONE immediate release tablet 5 mg, 5 mg, Oral, Q4H PRN, Keanu Lagos DO, 5 mg at 02/19/23 2154    oxyCODONE immediate release tablet Tab 10 mg, 10 mg, Oral, Q4H PRN, Keanu Lagos DO, 10 mg at 02/22/23 0925    polyethylene glycol packet 17 g, 17 g, Oral, Daily, Rafa Cardozo MD, 17 g at 02/22/23 0915    prochlorperazine injection Soln 5 mg, 5 mg, Intravenous, Q6H PRN, Pablo Ramirez MD, 5 mg at 02/20/23 0542    simethicone chewable tablet 80 mg, 1 tablet, Oral, TID PRN, Giovany Gleason MD, 80 mg at 02/21/23 2103    sodium chloride 0.9% flush 10 mL, 10 mL, Intravenous, PRN, Pablo Ramirez MD    sodium chloride 0.9% flush 10 mL, 10 mL, Intra-Catheter, PRN, Pablo Ramirez MD    Vital Signs:  Vitals:    02/22/23 1150   BP: (!) 106/55   Pulse: 98   Resp: 20   Temp: 98.2 °F (36.8 °C)       In/Out:  Intake/Output - Last 3 Shifts         02/20 0700 02/21 0659 02/21 0700 02/22 0659 02/22 0700 02/23 0659    P.O. 650 950 240    I.V. (mL/kg)       Total Intake(mL/kg) 650 (11.8) 950 (17.3) 240 (4.4)    Urine (mL/kg/hr) 530 (0.4) 400 (0.3)     Other       Stool 0 100 50    Total Output 530 500 50    Net +120 +450 +190           Urine Occurrence  300 x     Stool Occurrence 2 x 1 x 1 x            Physical Exam:  General: Alert, oriented, in no apparent distress  HEENT: Sclera anicteric, trachea midline  Lungs: Normal respiratory rate and effort on  room air  Abdomen: Soft, minimal renata-incisional TTP, nondistended. Midline incision packed with iodoform superiorly/inferiorly with improving superior renata-incisional erythema and resolved inferior erythema, persistent small volume old hematoma drainage with dressing changes. LUQ end colostomy pink and well perfused with soft stool and flatus in bag.  Extremities: Warm, well perfused, no edema  Neuro: Grossly intact, moves all extremities  Psych: Appropriate affect    Laboratory Studies:  Complete Blood Count:  Lab Results   Component Value Date/Time    WBC 4.70 02/20/2023 02:26 AM    HGB 9.6 (L) 02/20/2023 02:26 AM    HCT 32.0 (L) 02/20/2023 02:26 AM    HCT 46 05/06/2022 09:55 PM    RBC 4.08 02/20/2023 02:26 AM     02/20/2023 02:26 AM       Basic Chemistry Panel:  Lab Results   Component Value Date/Time     (L) 02/22/2023 04:49 AM    K 4.5 02/22/2023 04:49 AM    CL 95 02/22/2023 04:49 AM    CO2 28 02/22/2023 04:49 AM    BUN 13 02/22/2023 04:49 AM    CREATININE 0.7 02/22/2023 04:49 AM    CALCIUM 8.9 02/22/2023 04:49 AM       Hepatic Panel:  Lab Results   Component Value Date/Time    AST 36 02/06/2023 01:15 PM    ALT 23 02/06/2023 01:15 PM    ALKPHOS 116 02/06/2023 01:15 PM    BILITOT 1.4 (H) 02/06/2023 01:15 PM    ALBUMIN 1.7 (L) 02/06/2023 01:15 PM       Coagulation Panel:  Lab Results   Component Value Date/Time    INR 1.2 07/21/2022 04:28 AM       Imaging Studies:  XR Chest (2/20/23):  ICD remains on the left with electrodes to the right atrium and ventricle.  Heart size is mildly enlarged, looks little smaller than on recent exam.  The mediastinal structures are midline.  Lungs are expanded with normal pulmonary vascularity.  Interstitial markings toward the bases are slightly increased consistent with edema, improved.  No significant airspace consolidation or pleural fluid is detected.  Skeletal structures are intact.    Assessment:  Odette Hart is a 67 y.o. year old female with history of  HTN, CAD s/p PCI c/b HFrEF (EF 10%), MDD, debility, severe malnutrition, and open cholecystectomy and takedown of colovesical and colovaginal fistuli (two separate areas) with sigmoid colectomy, partial cecectomy and appendectomy, drainage of intra-abdominal abscess and end colostomy 5/9/2022 recently admitted 12/22/22 - 12/30/22 for new colocutaneous fistula admitted 1/32/23 with incarcerated colonic prolapse from her fistula s/p manual reduction now s/p open transverse/descending colectomy with takedown of colocutaneous fistula and end colostomy + parastomal hernia repair + lysis of adhesions >60 minutes (Juliane) + debridement of skin/subcutaneos fat/fascia/muscle (Daniel) 2/7/23, postoperative course complicated by initial ileus since resolved, midline wound hematoma s/p bedside evacuation with cotton-tipped applicator and subsequent iodoform packing, persistent malnutrition (prealbumin 7), and hematuria likely from JJ ureteral stent in setting of ASA/Plavix/LVX.     Plan:  - Plan to repeat CT Abd/Pelv w IV Contrast today to eval for intra-abdominal collection and image her abdominal wall closure given persistent hematoma/drainage from her midline incision  - Cont BID iodofom dressing changes, RN to change overlying 4x4 gauze dressing as needed for incisional drainage  - Cont LRD as tolerated, HLIV  - Cont multimodal oral analgesia for postoperative pain to minimize narcotics   - Hematuria likely 2/2 presence of JJ stent and anticoagulation; will hold LVX, cont ASA/Plavix, no e/o UTI. Monitor for urinary retention with PVR given potential need for quinones/bladder irrigation of large volume blood and clot  - Appreciate medicine recs for management of multiple comorbidities, PO Lasix resumed 2/14/23  - ASA/Plavix, LVX held in setting of hematuria, contSCD, ambulation/IS PPX  - PT/OT for postoperative mobilization/ambulation  - WOCN c/s for fresh end colostomy care and repeat teaching  - Social work assisting in  arranging home health and home PT/OT upon discharge  - Dispo: ongoing, GISSU    Patient evaluated with attending, Dr. Cardozo.    Pablo Ramirez MD  Colon and Rectal Surgery Fellow

## 2023-02-22 NOTE — HPI
66 yo F with HTN, HLD (statin intolerance), DM, CAD s/p PCI to LAD (2012)/LCX (1/2022), HFrEF (EF10%), debility, severe malnutrition, and the following procedures performed 5/2022: cholecystectomy, takedown of colovesical and colovaginal fistula, sigmoid colectomy, partial cecectomy, end colostomy who presents for colocutaneous fistula repair with abdominal wall reconstruction. On admission she was found to be hyponatremic and in acute on chronic decompensated heart failure. Cardiology was consulted to manage her HF and have since signed off as her volume status has become more euvolemic following diuresis. Medicine was consulted for pr-eop evaluation and continued management of her hyponatremia. Per chart review patient is chronically, mildly hyponatremic at baseline. She takes Lasix, Toprol, spironolactone, and losartan for her HFrEF. She reports correct use of her home medications. She states her diet consists of peanut butter and crackers. Denies CP/SOB/nausea vomiting, cough fever chills, polydipsia. Her Na has been improving with diuresis. Patient has multiple risk factors that complicate surgery: acute on chronic decompensated HF, CAD with PCIx2, hypertension, hyperlipidemia, malnutrition. This being said she does not have reported/documented/observed dysfunction of liver, kidney, lung, clotting.     On examination, she is chronically ill appearing/in no acute distress/at mental baseline. AF, /58 (78), sat 98% on RA, no leukocytosis, hgb 11.8, Hct 38.5, Plts 237, moderate Hyponatremia Na 124 (initially 122 on admission) with labile Baseline, K 3.1, Cl 85, Bun 13, Cr 00.8, Ca 7.9, albumin 2-3. BNP 2600, UOP 2300 on lasix 80mg     CXR with mild pulmonary congestion        Overview/Hospital Course:  Admitted for planned colocutaneous fistula repair, found to be hyponatremic and in ADHF. Started on IV diuresis, >6 L UOP, returned to euvolemia. Urine/serum studies consistent with hypervolemic hyponatremia  most likely in 2/2 to acute on chronic HF. She does have multiple factors that maker her high risk for surgery, primarily her severrely reduced LVEF 10%. Cardiology and medicine consulted for pre-operative evaluation. Agree she is high risk, but need of her procedure outweigh the risks. 2/6 Patient tolerated colocutaneous takedown/reconstruction well. Her Hyponatremia has resolved with Lasix 80mg BID. S/p colocutaneous fistula revision with intra-op R ureteral stent placement for hydronephrosis. Developed post-op ileus. Given lasix holiday for over diuresis. Plavix, ASA restarted post-op. Anticipate discharge on prior home PO lasix dosing.      Complaint of dysuria and dark cloudy urine noted by primary team, patient started on cipro then transitioned to cefpodoxime (broader coverage) given culture results of multiple organisms none in predominance (thus an unequivocal UCx).  Recently discontinued from PCA pump with PRN pain management of oxy 5 and 10 mg. Restarted lasix 20 mg on 2/14/23. CRS monitoring surgical site closely, some output of serous fluid, no signs of cellulitis. Continued symptomatic hypotension despite discontinuing GDMT.     hospitals asked CTS team to review patient regarding advanced heart failure options.

## 2023-02-22 NOTE — PLAN OF CARE
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      Hypercholesterolemia [E78.00]  Yes    Coronary artery disease [I25.10]  Yes     2012: [place]: Anterior MI. LAD stent.  1/21/2022: NSTEMI. Troponin 9.  1/24/2022: [facility]: Cath: LAD: Proximal stent patent. LCX: Proximal 80%. LCX: Dominant. Moderate disease. LCX: HEVER 2.75 x 18 mm. Distal dissection. HEVER 2.75 x 22 mm.        Ischemic cardiomyopathy [I25.5]  Yes     1/24/2022: Echo: Severely enlarged left ventricle with severe systolic dysfunction. EF 20%.         Automatic implantable cardioverter-defibrillator in situ [Z95.810]  Yes     2014: [place] ICD.           Resolved Hospital Problems    Diagnosis Date Resolved POA    Acute cystitis with hematuria [N30.01] 02/17/2023 Unknown    Preop examination [Z01.818] 02/06/2023 Not Applicable    Hypotension [I95.9] 02/06/2023 Yes       Follow Up Appointments:  Future Appointments   Date Time Provider Department Center   2/24/2023  3:40 PM [facility] UROLOGY PHYSICIAN CLINIC [facility] UROLOG [facility]   2/28/2023  2:00 PM Rafa Cardozo MD [facility] COLSURG [facility]   2/28/2023  3:15 PM DANIEL Ayala [facility] ENTERO [facility]   3/7/2023 11:15 AM Yohannes Cordova MD [id number]       Allergies:  Review of patient's allergies indicates:   Allergen Reactions    Augmentin [amoxicillin-pot clavulanate] Swelling     Not allergic to amoxicillin just a derivative of augmentin    Lisinopril Other (See Comments)      Fluid around heart    Shellfish containing products Anaphylaxis     Pt.states she is allergic to SEAFOOD since the age of 13    Levaquin [levofloxacin] Other (See Comments)     Very bad joint pain    Clindamycin Palpitations     Chest pain       Medications: Review discharge medications with patient and family and provide education.    Current Facility-Administered Medications   Medication Dose Route Frequency Provider Last Rate Last Admin    acetaminophen tablet 1,000 mg  1,000 mg Oral Q8H PRN Keanu Lagos DO        albuterol inhaler 2 puff  2 puff Inhalation Q6H PRN Pablo Ramirez MD        ALPRAZolam tablet 0.5 mg  0.5 mg Oral Nightly PRN Rafa Cardozo MD   0.5 mg at 02/22/23 0203    aspirin EC tablet 81 mg  81 mg Oral Daily Pablo Ramirez MD   81 mg at 02/22/23 0915    balsam peru-castor oiL Oint   Topical (Top) BID Pablo Ramirez MD   Given at 02/22/23 0921    clopidogreL tablet 75 mg  75 mg Oral Daily Pablo Ramirez MD   75 mg at 02/22/23 0917    dextrose 10% bolus 125 mL 125 mL  12.5 g Intravenous PRN Pablo Ramirez MD        dextrose 10% bolus 250 mL 250 mL  25 g Intravenous PRN Pablo Ramirez MD        ezetimibe tablet 10 mg  10 mg Oral Daily Pablo Ramirez MD   10 mg at 02/22/23 0915    famotidine tablet 20 mg  20 mg Oral BID Rafa Cardozo MD   20 mg at 02/22/23 0917    fluticasone propionate 50 mcg/actuation nasal spray 50 mcg  1 spray Each Nostril Daily PRN Pablo Ramirez MD        furosemide tablet 20 mg  20 mg Oral Daily Keanu Lagos DO   20 mg at 02/22/23 0915    magnesium sulfate 2g in water 50mL IVPB (premix)  2 g Intravenous Q2H Pablo Ramirez MD 25 mL/hr at 02/22/23 1014 2 g at 02/22/23 1014    metoprolol succinate (TOPROL-XL) 24 hr tablet 25 mg  25 mg Oral Daily Adia Poon MD   25 mg at 02/22/23 0917    naloxone 0.4 mg/mL injection 0.02 mg  0.02 mg Intravenous PRN Pablo Ramirez MD        nitroGLYCERIN SL tablet 0.4 mg  0.4 mg Sublingual Q5  Min PRN Pablo Ramirez MD        nystatin 100,000 unit/mL suspension 500,000 Units  500,000 Units Oral QID Fahad Smith MD   500,000 Units at 02/22/23 0915    ondansetron injection 4 mg  4 mg Intravenous Q6H PRN Pablo Ramirez MD   4 mg at 02/20/23 1148    oxyCODONE immediate release tablet 5 mg  5 mg Oral Q4H PRN Keanu Lagos, DO   5 mg at 02/19/23 2154    oxyCODONE immediate release tablet Tab 10 mg  10 mg Oral Q4H PRN Keanu Lagos, DO   10 mg at 02/22/23 0925    polyethylene glycol packet 17 g  17 g Oral Daily Rafa Cardozo MD   17 g at 02/22/23 0915    prochlorperazine injection Soln 5 mg  5 mg Intravenous Q6H PRN Pablo Ramirez MD   5 mg at 02/20/23 0542    simethicone chewable tablet 80 mg  1 tablet Oral TID PRN Giovany Gleason MD   80 mg at 02/21/23 2103    sodium chloride 0.9% flush 10 mL  10 mL Intravenous PRN Pablo Ramirez MD        sodium chloride 0.9% flush 10 mL  10 mL Intra-Catheter PRN Pablo Ramirez MD         Current Discharge Medication List        CONTINUE these medications which have NOT CHANGED    Details   aspirin (ECOTRIN) 81 MG EC tablet Take 1 tablet (81 mg total) by mouth once daily.  Qty: 90 tablet, Refills: 3    Associated Diagnoses: Coronary artery disease involving native coronary artery of native heart without angina pectoris      clopidogreL (PLAVIX) 75 mg tablet Take 1 tablet (75 mg total) by mouth once daily.  Qty: 90 tablet, Refills: 3      ezetimibe (ZETIA) 10 mg tablet Take 1 tablet (10 mg total) by mouth once daily.  Qty: 90 tablet, Refills: 3    Associated Diagnoses: Hypercholesterolemia      acetaminophen (TYLENOL) 500 MG tablet Take 2 tablets (1,000 mg total) by mouth every 8 (eight) hours as needed for Pain.  Refills: 0      albuterol (PROVENTIL/VENTOLIN HFA) 90 mcg/actuation inhaler Inhale 2 puffs into the lungs every 6 (six) hours as needed for Wheezing. Rescue  Qty: 18 g, Refills: 1    Associated Diagnoses: Bronchitis      ALPRAZolam (XANAX) 0.5 MG  tablet Take 1 tablet (0.5 mg total) by mouth nightly as needed for Anxiety.  Qty: 30 tablet, Refills: 1    Associated Diagnoses: Anxiety      docusate sodium (COLACE) 100 MG capsule Take 1 capsule (100 mg total) by mouth 2 (two) times daily.  Qty: 60 capsule, Refills: 1      empagliflozin (JARDIANCE) 10 mg tablet Take 1 tablet (10 mg total) by mouth once daily.  Qty: 90 tablet, Refills: 3    Associated Diagnoses: Heart failure, systolic and diastolic, chronic      famotidine (PEPCID) 20 MG tablet Take 1 tablet (20 mg total) by mouth once daily.  Qty: 30 tablet, Refills: 0      fluticasone propionate (FLONASE) 50 mcg/actuation nasal spray 1 spray (50 mcg total) by Each Nostril route once daily.  Qty: 9.9 mL, Refills: 1    Associated Diagnoses: Sinusitis, unspecified chronicity, unspecified location      food supplemt, lactose-reduced (ENSURE HIGH PROTEIN) Liqd Take 240 mLs by mouth 2 (two) times a day.  Qty: 60 each, Refills: 3    Associated Diagnoses: Weight loss; FTT (failure to thrive) in adult      furosemide (LASIX) 40 MG tablet Take 1 tablet (40 mg total) by mouth once daily.  Qty: 120 tablet, Refills: 3    Associated Diagnoses: Heart failure, systolic and diastolic, chronic      HYDROcodone-acetaminophen (NORCO) 5-325 mg per tablet Take 1 tablet by mouth every 8 (eight) hours as needed for Pain.  Qty: 20 tablet, Refills: 0    Comments: Quantity prescribed more than 7 day supply? Yes, quantity medically necessary  Associated Diagnoses: Abdominal pain, unspecified abdominal location      losartan (COZAAR) 25 MG tablet Take 1 tablet (25 mg total) by mouth once daily.  Qty: 90 tablet, Refills: 3    Associated Diagnoses: Heart failure, systolic and diastolic, chronic      metoprolol succinate (TOPROL-XL) 25 MG 24 hr tablet Take 1 tablet (25 mg total) by mouth once daily.  Qty: 90 tablet, Refills: 3    Associated Diagnoses: Heart failure, systolic and diastolic, chronic      nitroGLYCERIN (NITROSTAT) 0.4 MG SL  tablet Place 1 tablet (0.4 mg total) under the tongue every 5 (five) minutes as needed for Chest pain.  Qty: 25 tablet, Refills: 3      ondansetron (ZOFRAN-ODT) 8 MG TbDL Take 1 tablet (8 mg total) by mouth every 12 (twelve) hours as needed (nausea).  Qty: 20 tablet, Refills: 2    Associated Diagnoses: Nausea      simethicone (MYLICON) 80 MG chewable tablet Take 1 tablet (80 mg total) by mouth every 8 (eight) hours as needed for Flatulence.  Qty: 120 tablet, Refills: 5    Associated Diagnoses: S/P colostomy; Abdominal gas pain      spironolactone (ALDACTONE) 25 MG tablet Take 0.5 tablets (12.5 mg total) by mouth once daily.  Qty: 90 tablet, Refills: 3    Associated Diagnoses: Heart failure, systolic and diastolic, chronic               I have seen and examined this patient within the last 30 days. My clinical findings that support the need for the home health skilled services and home bound status are the following:no   Weakness/numbness causing balance and gait disturbance due to Weakness/Debility and Surgery making it taxing to leave home.  Requiring assistive device to leave home due to unsteady gait caused by  Heart Failure, Weakness/Debility, and Surgery.  Medical restrictions requiring assistance of another human to leave home due to  Unstable ambulation, Post surgery monitoring, Wound care needs, and Newly placed G-tube/ostomy.     Diet:   cardiac diet    Labs:  None    Referrals/ Consults  Physical Therapy to evaluate and treat. Evaluate for home safety and equipment needs; Establish/upgrade home exercise program. Perform / instruct on therapeutic exercises, gait training, transfer training, and Range of Motion.  Occupational Therapy to evaluate and treat. Evaluate home environment for safety and equipment needs. Perform/Instruct on transfers, ADL training, ROM, and therapeutic exercises.   to evaluate for community resources/long-range planning.  Aide to provide assistance with personal care,  ADLs, and vital signs.    Activities:   ambulate in house with assistance, no strenuous exercise for 6 weeks, and no heavy lifting for 6 weeks    Nursing:   Agency to admit patient within 24 hours of hospital discharge unless specified on physician order or at patient request    SN to complete comprehensive assessment including routine vital signs. Instruct on disease process and s/s of complications to report to MD. Review/verify medication list sent home with the patient at time of discharge  and instruct patient/caregiver as needed. Frequency may be adjusted depending on start of care date.     Skilled nurse to perform up to 3 visits PRN for symptoms related to diagnosis    Notify MD if SBP > 160 or < 90; DBP > 90 or < 50; HR > 120 or < 50; Temp > 101; O2 < 88%; Other:   N/A    Ok to schedule additional visits based on staff availability and patient request on consecutive days within the home health episode.    When multiple disciplines ordered:    Start of Care occurs on Sunday - Wednesday schedule remaining discipline evaluations as ordered on separate consecutive days following the start of care.    Thursday SOC -schedule subsequent evaluations Friday and Monday the following week.     Friday - Saturday SOC - schedule subsequent discipline evaluations on consecutive days starting Monday of the following week.    For all post-discharge communication and subsequent orders please contact patient's primary care physician. If unable to reach primary care physician or do not receive response within 30 minutes, please contact Dr. Cardozo's clinic for clinical staff order clarification    Miscellaneous   Colostomy Care:  Instruct patient/caregiver to empty bag when full and PRN., Change and clean site every 48 hours, and Monitor skin integrity.    Home Health Aide:  Nursing Three times weekly, Physical Therapy Three times weekly, Occupational Therapy Three times weekly, and Home Health Aide Three times weekly    Wound  Care Orders  yes:  Surgical Wound:  Location: Abdominal midline    Consult ET nurse        Apply the following to wound:   Other: Iodoform packing of superior/inferior aspects twice daily and covered by dry gauze to collect drainage (frequency)    I certify that this patient is confined to her home and needs intermittent skilled nursing care, physical therapy, and speech therapy.    Pablo Ramirez MD  Colon and Rectal Surgery Fellow

## 2023-02-22 NOTE — PROGRESS NOTES
[facility]  Urology  Progress Note    Patient Name: Odette Hart  MRN: [id number]  Admission Date: 1/31/2023  Hospital Length of Stay: 22 days  Code Status: Full Code   Attending Provider: Rafa Cardozo MD   Primary Care Physician: Braxton Barragan MD    Subjective:     HPI:  Odette Hart is a 67 y.o. female with history of HTN, CAD s/p PCI c/b HFrEF (EF 10%), MDD, debility, and severe malnutrition.  On 5/9/2022 she underwent open cholecystectomy and takedown of colovesical and colovaginal fistuli (two separate areas) with sigmoid colectomy, partial cecectomy and appendectomy, drainage of intra-abdominal abscess and end colostomy. 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 On 02/06/23 she underwent colocutaneous fistula revision with intraoperative placement of a right ureteral stent for new right hydroureteronephrosis noted on pre-operative imaging, as well as intraoperative retrograde pyelogram.      Interval History: Patient developed hematuria overnight. Purewick canister noted to have thin red urine.  Patient reports intermittent sensation of incomplete emptying. Cr 0.7.       Objective:     Temp:  [98 °F (36.7 °C)-99 °F (37.2 °C)] 98.2 °F (36.8 °C)  Pulse:  [82-98] 98  Resp:  [15-20] 20  SpO2:  [93 %-98 %] 95 %  BP: ()/(49-56) 106/55     Body mass index is 21.43 kg/m².    Date 02/22/23 0700 - 02/23/23 0659   Shift 1400-2889 6443-5267 [id number] 24 Hour Total   INTAKE   P.O. 240   240   Shift Total(mL/kg) 240(4.4)   240(4.4)   OUTPUT   Urine(mL/kg/hr) 100   100   Stool 50   50   Shift Total(mL/kg) 150(2.7)   150(2.7)   Weight (kg) 54.9 54.9 54.9 54.9     Bladder Scan Volume (mL): 12 mL (02/22/23 1042)  Post Void Cath Residual Output (mL): 100 mL (02/22/23 0935)    Drains       Drain  Duration                  Ileostomy 02/06/23 1118 Standard (Marion, jaelyn) LUQ 16 days         Ureteral Drain/Stent 02/06/23 0813 Left ureter 5 Fr. 16 days                     Physical Exam  Constitutional:       General: She is not in acute distress.  HENT:      Mouth/Throat:      Mouth: Mucous membranes are moist.   Eyes:      Extraocular Movements: Extraocular movements intact.   Cardiovascular:      Rate and Rhythm: Normal rate.   Abdominal:      General: There is no distension.      Palpations: Abdomen is soft.      Comments: Midline incision dressing without strikethrough, colostomy with stool output.    Genitourinary:     Comments: Purewick draining thin red urine  Skin:     General: Skin is warm and dry.   Neurological:      Mental Status: She is alert and oriented to person, place, and time.   Psychiatric:         Mood and Affect: Mood normal.       Significant Labs:    BMP:  Recent Labs   Lab 02/20/23  0226 02/21/23  1318 02/22/23  0449   * 132* 132*   K 4.0 4.4 4.5   CL 93* 94* 95   CO2 31* 29 28   BUN 11 13 13   CREATININE 0.6 0.7 0.7   CALCIUM 8.8 8.8 8.9       CBC:   Recent Labs   Lab 02/18/23  0557 02/20/23  0226   WBC 4.99 4.70   HGB 10.5* 9.6*   HCT 35.1* 32.0*    405       Urine Studies:   Recent Labs   Lab 02/22/23  0134   COLORU Red*   APPEARANCEUA Ex.Turbid   PHUR 7.0   SPECGRAV 1.020   PROTEINUA 2+*   GLUCUA Negative   KETONESU Trace*   BILIRUBINUA Negative   OCCULTUA 3+*   NITRITE Negative   LEUKOCYTESUR 1+*   RBCUA >100*   WBCUA 0   BACTERIA None   HYALINECASTS 0       Significant Imaging:  All pertinent imaging results/findings from the past 24 hours have been reviewed.      Assessment/Plan:     Gross hematuria  Odette Hart is a 67 y.o. female with above comorbidities who underwent colovesical fistula repair on 02/06/23 as well as intraoperative placement of a right ureteral stent due to right hydronephrosis noted on pre-operative imaging.    - Hematuria likely secondary to stent  - PVR 12 cc, patient not in retention  - Will f/u CT scan today  - Stent in adequate position on CT from 02/10  - Outpatient f/u for further  assessment of hydronephrosis  - Urology will arrange outpatient f/u  - No acute urologic intervention indicated  - Urology will sign off at this time        Anival Lama MD  Urology  [facility]

## 2023-02-22 NOTE — SUBJECTIVE & OBJECTIVE
Scheduled Meds:   aspirin  81 mg Oral Daily    balsam peru-castor oiL   Topical (Top) BID    clopidogreL  75 mg Oral Daily    ezetimibe  10 mg Oral Daily    famotidine  20 mg Oral BID    furosemide  20 mg Oral Daily    magnesium sulfate IVPB  2 g Intravenous Q2H    metoprolol succinate  25 mg Oral Daily    nystatin  500,000 Units Oral QID    polyethylene glycol  17 g Oral Daily     Continuous Infusions:  PRN Meds:.[COMPLETED] acetaminophen **FOLLOWED BY** acetaminophen, albuterol, ALPRAZolam, dextrose 10%, dextrose 10%, fluticasone propionate, naloxone, nitroGLYCERIN, ondansetron, oxyCODONE, oxyCODONE, prochlorperazine, simethicone, sodium chloride 0.9%, sodium chloride 0.9%      Current Outpatient Medications on File Prior to Encounter   Medication Sig    aspirin (ECOTRIN) 81 MG EC tablet Take 1 tablet (81 mg total) by mouth once daily.    clopidogreL (PLAVIX) 75 mg tablet Take 1 tablet (75 mg total) by mouth once daily.    ezetimibe (ZETIA) 10 mg tablet Take 1 tablet (10 mg total) by mouth once daily.    acetaminophen (TYLENOL) 500 MG tablet Take 2 tablets (1,000 mg total) by mouth every 8 (eight) hours as needed for Pain.    albuterol (PROVENTIL/VENTOLIN HFA) 90 mcg/actuation inhaler Inhale 2 puffs into the lungs every 6 (six) hours as needed for Wheezing. Rescue (Patient not taking: Reported on 1/25/2023)    ALPRAZolam (XANAX) 0.5 MG tablet Take 1 tablet (0.5 mg total) by mouth nightly as needed for Anxiety.    docusate sodium (COLACE) 100 MG capsule Take 1 capsule (100 mg total) by mouth 2 (two) times daily.    empagliflozin (JARDIANCE) 10 mg tablet Take 1 tablet (10 mg total) by mouth once daily.    famotidine (PEPCID) 20 MG tablet Take 1 tablet (20 mg total) by mouth once daily.    fluticasone propionate (FLONASE) 50 mcg/actuation nasal spray 1 spray (50 mcg total) by Each Nostril route once daily.    food supplemt, lactose-reduced (ENSURE HIGH PROTEIN) Liqd Take 240 mLs by mouth 2 (two) times a day.     furosemide (LASIX) 40 MG tablet Take 1 tablet (40 mg total) by mouth once daily.    HYDROcodone-acetaminophen (NORCO) 5-325 mg per tablet Take 1 tablet by mouth every 8 (eight) hours as needed for Pain.    losartan (COZAAR) 25 MG tablet Take 1 tablet (25 mg total) by mouth once daily.    metoprolol succinate (TOPROL-XL) 25 MG 24 hr tablet Take 1 tablet (25 mg total) by mouth once daily.    nitroGLYCERIN (NITROSTAT) 0.4 MG SL tablet Place 1 tablet (0.4 mg total) under the tongue every 5 (five) minutes as needed for Chest pain. (Patient not taking: Reported on 1/27/2023)    ondansetron (ZOFRAN-ODT) 8 MG TbDL Take 1 tablet (8 mg total) by mouth every 12 (twelve) hours as needed (nausea).    simethicone (MYLICON) 80 MG chewable tablet Take 1 tablet (80 mg total) by mouth every 8 (eight) hours as needed for Flatulence.    spironolactone (ALDACTONE) 25 MG tablet Take 0.5 tablets (12.5 mg total) by mouth once daily.    [DISCONTINUED] pantoprazole (PROTONIX) 40 MG tablet Take 1 tablet (40 mg total) by mouth once daily.    [DISCONTINUED] prasugreL (EFFIENT) 10 mg Tab Take 1 tablet (10 mg total) by mouth once daily.       Review of patient's allergies indicates:   Allergen Reactions    Augmentin [amoxicillin-pot clavulanate] Swelling     Not allergic to amoxicillin just a derivative of augmentin    Lisinopril Other (See Comments)     Fluid around heart    Shellfish containing products Anaphylaxis     Pt.states she is allergic to SEAFOOD since the age of 13    Levaquin [levofloxacin] Other (See Comments)     Very bad joint pain    Clindamycin Palpitations     Chest pain       Past Medical History:   Diagnosis Date    Acute coronary syndrome     Acute exacerbation of chronic obstructive pulmonary disease (COPD) 3/23/2022    Allergy     Arthritis     Cardiomyopathy     CHF (congestive heart failure)     Coronary artery disease     Coronary artery disease of native artery of native heart with stable angina pectoris 4/19/2021     2022: [facility]: Cath: LAD: Proximal stent patent. LCX: Proximal 80%. LCX: Dominant. Moderate disease. LCX: HEVER 2.75 x 18 mm. Distal dissection. HEVER 2.75 x 22 mm.     Diverticulitis     Diverticulosis     Familial hypercholesterolemia 2022    Former smoker 2022    Heart attack     Heart disease     History of myocardial infarction 2022    Hyperlipidemia     Hypertension     Hypertension 2022    ICD (implantable cardioverter-defibrillator) in place     Non-ST elevation myocardial infarction (NSTEMI) 2019     Past Surgical History:   Procedure Laterality Date    APPENDECTOMY N/A 2022    Procedure: APPENDECTOMY;  Surgeon: Rafa Cardozo MD;  Location: [facility] OR 2ND FLR;  Service: Colon and Rectal;  Laterality: N/A;    ATRIAL CARDIAC PACEMAKER INSERTION      CECECTOMY N/A 2022    Procedure: EXCISION, CECUM;  Surgeon: Rafa Cardozo MD;  Location: [facility] OR [facility] FLR;  Service: Colon and Rectal;  Laterality: N/A;     SECTION      CHOLECYSTECTOMY N/A 2022    Procedure: CHOLECYSTECTOMY;  Surgeon: Doug Epstein MD;  Location: [facility] OR [facility] FLR;  Service: General;  Laterality: N/A;    COLONOSCOPY N/A 2022    Procedure: COLONOSCOPY;  Surgeon: Rafa Cardozo MD;  Location: [facility] OR 2ND FLR;  Service: Colon and Rectal;  Laterality: N/A;    COLOSTOMY  2022    Procedure: CREATION, COLOSTOMY;  Surgeon: Rafa Cardozo MD;  Location: [facility] OR [facility] FLR;  Service: Colon and Rectal;;    CORONARY STENT PLACEMENT      CYSTOSCOPY W/ URETERAL STENT PLACEMENT Right 2023    Procedure: CYSTOSCOPY, WITH URETERAL STENT INSERTION;  Surgeon: Felice Laureano MD;  [facility];  Service: Urology;  Laterality: Right;  6X24 stent    DEBRIDEMENT, ABDOMEN N/A 2023    Procedure: DEBRIDEMENT, ABDOMEN;  Surgeon: Lucio Sanchez DO;  Location: [facility] OR 2ND FLR;  Service: Plastics;  Laterality: N/A;  abdominal wall, and fascia.    INSERTION OF URETERAL CATHETER  Left 2/6/2023    Procedure: INSERTION, CATHETER, URETER, BILATERAL;  Surgeon: Felice Laureano MD;  Location: [facility] OR 2ND FLR;  Service: Urology;  Laterality: Left;    LEFT HEART CATHETERIZATION Left 1/24/2022    Procedure: CATHETERIZATION, HEART, LEFT;  Surgeon: Yohannes Cordova MD;  Location: [facility] CATH LAB;  Service: Cardiology;  Laterality: Left;    LYSIS OF ADHESIONS N/A 2/6/2023    Procedure: LYSIS, ADHESIONS;  Surgeon: Rafa Cardozo MD;  Location: [facility] OR 2ND FLR;  Service: Colon and Rectal;  Laterality: N/A;    REVISION COLOSTOMY N/A 2/6/2023    Procedure: REVISION, COLOSTOMY;  Surgeon: Rafa Cardozo MD;  Location: [facility] OR 2ND FLR;  Service: Colon and Rectal;  Laterality: N/A;    SUBTOTAL COLECTOMY  2/6/2023    Procedure: COLECTOMY, PARTIAL;  Surgeon: Rafa Cardozo MD;  Location: [facility] OR [facility] FLR;  Service: Colon and Rectal;;     Family History       Problem Relation (Age of Onset)    Emphysema Father    Heart attack Brother    Heart disease Mother          Tobacco Use    Smoking status: Former     Packs/day: 0.50     Types: Cigarettes     Start date: 2/4/1976     Quit date: 12/4/2012     Years since quitting: 10.2    Smokeless tobacco: Never   Substance and Sexual Activity    Alcohol use: No    Drug use: No    Sexual activity: Not on file     Review of Systems  Objective:     Vital Signs (Most Recent):  Temp: 98.2 °F (36.8 °C) (02/22/23 1150)  Pulse: 98 (02/22/23 1150)  Resp: 20 (02/22/23 1150)  BP: (!) 106/55 (02/22/23 1150)  SpO2: 95 % (02/22/23 1150)   Vital Signs (24h Range):  Temp:  [98 °F (36.7 °C)-99 °F (37.2 °C)] 98.2 °F (36.8 °C)  Pulse:  [82-98] 98  Resp:  [15-20] 20  SpO2:  [93 %-98 %] 95 %  BP: ()/(49-56) 106/55     Weight: 54.9 kg (121 lb)  Body mass index is 21.43 kg/m².    SpO2: 95 %        Intake/Output - Last 3 Shifts         02/20 0700 02/21 0659 02/21 0700 02/22 0659 02/22 0700 02/23 0659    P.O. 650 950 480    I.V. (mL/kg)       Total Intake(mL/kg) 650 (11.8)  950 (17.3) 480 (8.7)    Urine (mL/kg/hr) 530 (0.4) 400 (0.3) 100 (0.3)    Other       Stool 0 100 50    Total Output 530 500 150    Net +120 +450 +330           Urine Occurrence  300 x     Stool Occurrence 2 x 1 x 1 x             Lines/Drains/Airways       Drain  Duration                  Ileostomy 02/06/23 1118 Standard ([place], end) LUQ 16 days         Ureteral Drain/Stent 02/06/23 0813 Left ureter 5 Fr. 16 days              Peripheral Intravenous Line  Duration                  Peripheral IV - Single Lumen 02/17/23 1000 20 G Anterior;Right Forearm 5 days                     STS Risk Score: na    Physical Exam    Significant Labs:  ABGs: No results for input(s): PH, PCO2, PO2, HCO3, POCSATURATED, BE in the last 48 hours.  Amylase: No results for input(s): AMYLASE in the last 48 hours.  BMP:   Recent Labs   Lab 02/22/23  0449   GLU 92   *   K 4.5   CL 95   CO2 28   BUN 13   CREATININE 0.7   CALCIUM 8.9   MG 1.7     Cardiac markers: No results for input(s): CKMB, CPKMB, TROPONINT, TROPONINI, MYOGLOBIN in the last 48 hours.  CBC: No results for input(s): WBC, RBC, HGB, HCT, PLT, MCV, MCH, MCHC in the last 48 hours.  CMP:   Recent Labs   Lab 02/22/23  0449   GLU 92   CALCIUM 8.9   *   K 4.5   CO2 28   CL 95   BUN 13   CREATININE 0.7     Coagulation: No results for input(s): PT, INR, APTT in the last 48 hours.  Lactic Acid: No results for input(s): LACTATE in the last 48 hours.  LFTs: No results for input(s): ALT, AST, ALKPHOS, BILITOT, PROT, ALBUMIN in the last 48 hours.  Lipase: No results for input(s): LIPASE in the last 48 hours.    Significant Diagnostics: reviewed   TTE 12/23/22  The left ventricle is severely enlarged with eccentric hypertrophy and severely decreased systolic function. The estimated ejection fraction is 10%.  Normal right ventricular size with mildly reduced right ventricular systolic function.  Grade II left ventricular diastolic dysfunction.  Biatrial enlargement.  The estimated PA  systolic pressure is 51 mmHg.  Normal central venous pressure (3 mmHg).  LV 6.95  TAPSE 1.3    CT A/P 2/10/23  1. Findings suggesting a small bowel to rectal fistula.  Recommend surgical consultation and follow-up.  2. Mild complex fluid along the right anterolateral abdominal wall between the colon and abdominal wall with possible minimal contrast extravasation could represent large bowel perforation.  See above comments.  Recommend surgical follow-up.  3. 5.9 cm structure in the right pelvis which is indeterminate.  This could represent a complex collection or possibly the unopacified cecum.  See above comments.  Follow-up recommended.  4. Bibasilar small pleural effusions with associated consolidation and atelectasis.  Follow-up recommended.  5. Mild splenomegaly with findings suspicious for splenic ischemia/infarction in the superior portion.  See above comments.  Follow-up recommended.  6. Mild hydronephrosis on the right with right ureteral stent present.  7. Significant pneumoperitoneum.  This could be associated with recent surgery and or bowel perforation.  Surgical consultation and follow-up recommended.  8. Cardiomegaly.

## 2023-02-22 NOTE — ASSESSMENT & PLAN NOTE
66 yo F with HTN, HLD (statin intolerance), DM, CAD s/p PCI to LAD (2012)/LCX (1/2022), HFrEF (EF10%), debility, severe malnutrition, and the following procedures performed 5/2022: cholecystectomy, takedown of colovesical and colovaginal fistula, sigmoid colectomy, partial cecectomy, end colostomy who presents for colocutaneous fistula repair with abdominal wall reconstruction. Patient was supposed to follow up with HTS in the outpatient setting but they were consulted while here. CTS team asked to look into the patient's case regarding advanced heart failure options. Patient with an echo from December with EF 10%, LV 6.95, TAPSE 1.3. Patient is debilitated and ambulated with a walker. Also severely malnourished, with pre-albumin 7. Having issues with hypotension. Hematuria since last stent/colo-rectal procedure. Urology will follow up outpatient. Blood group O+ Likely not a candidate for advanced options. Dr. Steward to review and staff with final recommendations.

## 2023-02-22 NOTE — ASSESSMENT & PLAN NOTE
Seen by urology and stated that hematuria 2/2 stent placement. No acute intervention but will await read from CT AP.     Continue to monitor and follow up UPCR for proteinuria on UA as well

## 2023-02-22 NOTE — HPI
Odette Hart is a 67 y.o. female with history of HTN, CAD s/p PCI c/b HFrEF (EF 10%), MDD, debility, and severe malnutrition.  On 5/9/2022 she underwent open cholecystectomy and takedown of colovesical and colovaginal fistuli (two separate areas) with sigmoid colectomy, partial cecectomy and appendectomy, drainage of intra-abdominal abscess and end colostomy. 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 On 02/06/23 she underwent colocutaneous fistula revision with intraoperative placement of a right ureteral stent for new right hydroureteronephrosis noted on pre-operative imaging, as well as intraoperative retrograde pyelogram.

## 2023-02-22 NOTE — PT/OT/SLP PROGRESS
Physical Therapy Treatment    Patient Name:  Odette Hart   MRN:  [id number]    Recommendations:     Discharge Recommendations: home health PT  Discharge Equipment Recommendations: none  Barriers to discharge: None    Assessment:     Odette Hart is a 67 y.o. female admitted with a medical diagnosis of Colocutaneous fistula.  She presents with the following impairments/functional limitations: weakness, impaired endurance, impaired self care skills, impaired functional mobility, gait instability, impaired balance Pt. cooperative and tolerated treatment well. Pt. progressing with mobility.    Rehab Prognosis: Good; patient would benefit from acute skilled PT services to address these deficits and reach maximum level of function.    Recent Surgery: Procedure(s) (LRB):  REVISION, COLOSTOMY (N/A)  INSERTION, CATHETER, URETER, BILATERAL (Left)  CYSTOSCOPY, WITH URETERAL STENT INSERTION (Right)  DEBRIDEMENT, ABDOMEN (N/A)  COLECTOMY, PARTIAL  LYSIS, ADHESIONS (N/A) 16 Days Post-Op    Plan:     During this hospitalization, patient to be seen 3 x/week to address the identified rehab impairments via gait training, therapeutic activities, therapeutic exercises and progress toward the following goals:    Plan of Care Expires:  03/09/23    Subjective     Chief Complaint: weakness  Patient/Family Comments/goals: pt. Agreeable to PT  Pain/Comfort:  Pain Rating 1:  (pt. did not rate)      Objective:     Communicated with nursing prior to session.  Patient found supine with peripheral IV, PureWick (ileostomy) upon PT entry to room.     General Precautions: Standard, fall  Orthopedic Precautions: N/A  Braces: N/A  Respiratory Status: Room air     Functional Mobility:  Bed Mobility:     Rolling Right: stand by assistance  Scooting: stand by assistance  Supine to Sit: contact guard assistance  Transfers:     Sit to Stand:  contact guard assistance with rolling walker  Gait: 80' x2 trials with RW and CGA with decreased step  length/jan. Pt. had seated rest break between gait trials.  Balance: fair      AM-PAC 6 CLICK MOBILITY  Turning over in bed (including adjusting bedclothes, sheets and blankets)?: 3  Sitting down on and standing up from a chair with arms (e.g., wheelchair, bedside commode, etc.): 3  Moving from lying on back to sitting on the side of the bed?: 3  Moving to and from a bed to a chair (including a wheelchair)?: 3  Need to walk in hospital room?: 3  Climbing 3-5 steps with a railing?: 2  Basic Mobility Total Score: 17       Treatment & Education:  Discussed pt.'s progress, goals, and POC.    Patient left up in chair with all lines intact and call button in reach..    GOALS:   Multidisciplinary Problems       Physical Therapy Goals          Problem: Physical Therapy    Goal Priority Disciplines Outcome Goal Variances Interventions   Physical Therapy Goal     PT, PT/OT Ongoing, Progressing     Description: Goals to be met by: 2/15/2023, extended to     Patient will increase functional independence with mobility by performin. Gait  x 250 feet with Supervision using LRAD.   2. Ascend/descend 4 stair with right Handrails Supervision using no Assistive device.   3. Sit to Stand with Supervision using LRAD                       Time Tracking:     PT Received On: 23  PT Start Time: 1304     PT Stop Time: 1334  PT Total Time (min): 30 min     Billable Minutes: Gait Training 30    Treatment Type: Treatment  PT/PTA: PT     PTA Visit Number: 0     2023

## 2023-02-22 NOTE — CONSULTS
[facility]  Heart Transplant  Consult Note    Patient Name: Odette Hart  MRN: [id number]  Admission Date: 1/31/2023  Hospital Length of Stay: 22 days  Attending Physician: Rafa Cardozo MD  Primary Care Provider: Braxton Barragan MD   Principal Problem:Colocutaneous fistula    Inpatient consult to Heart Transplant  Consult performed by: Evens Talley MD  Consult ordered by: Keanu Lagos DO        Subjective:     History of Present Illness:  67 year old female with PMH of ICM/HFrEF (EF 10%) s/p PCI to LAD (2012)/LCX (1/2022), debility, malnutrition, DM, HTN, HLD, very complex abd surgical history who presented for olocutaneous fistula repair with abdominal wall reconstruction. On admit noted to be volume overload, diuresed well. Underwent surgery on 2/6. [facility] consulted for advanced options candidacy and GDMT management. She has had numerous admissions over the past year for abd issues during which she was noted to be volume overload and diuresed. During her current hospital course, she has been unable to tolerate low dose GDMT due to hypotension, currently on low dose toprol only.     She appears older than stated age. Reports she moslty uses walker at home, denies wheelchair use, prealbumin of 7.         Past Medical History:   Diagnosis Date    Acute coronary syndrome     Acute exacerbation of chronic obstructive pulmonary disease (COPD) 3/23/2022    Allergy     Arthritis     Cardiomyopathy     CHF (congestive heart failure)     Coronary artery disease     Coronary artery disease of native artery of native heart with stable angina pectoris 4/19/2021 1/24/2022: [facility]: Cath: LAD: Proximal stent patent. LCX: Proximal 80%. LCX: Dominant. Moderate disease. LCX: HEVER 2.75 x 18 mm. Distal dissection. HEVER 2.75 x 22 mm.     Diverticulitis     Diverticulosis     Familial hypercholesterolemia 1/22/2022    Former smoker 1/24/2022    Heart attack     Heart disease     History of myocardial  infarction 2022    Hyperlipidemia     Hypertension     Hypertension 2022    ICD (implantable cardioverter-defibrillator) in place     Non-ST elevation myocardial infarction (NSTEMI) 2019       Past Surgical History:   Procedure Laterality Date    APPENDECTOMY N/A 2022    Procedure: APPENDECTOMY;  Surgeon: aRfa Cardozo MD;  [facility];  Service: Colon and Rectal;  Laterality: N/A;    ATRIAL CARDIAC PACEMAKER INSERTION      CECECTOMY N/A 2022    Procedure: EXCISION, CECUM;  Surgeon: Rafa Cardozo MD;  [facility];  Service: Colon and Rectal;  Laterality: N/A;     SECTION      CHOLECYSTECTOMY N/A 2022    Procedure: CHOLECYSTECTOMY;  Surgeon: Doug Epstein MD;  [facility];  Service: General;  Laterality: N/A;    COLONOSCOPY N/A 2022    Procedure: COLONOSCOPY;  Surgeon: Rafa Cardozo MD;  [facility];  Service: Colon and Rectal;  Laterality: N/A;    COLOSTOMY  2022    Procedure: CREATION, COLOSTOMY;  Surgeon: Rafa Cardozo MD;  [facility];  Service: Colon and Rectal;;    CORONARY STENT PLACEMENT      CYSTOSCOPY W/ URETERAL STENT PLACEMENT Right 2023    Procedure: CYSTOSCOPY, WITH URETERAL STENT INSERTION;  Surgeon: Felice Laureano MD;  Location: [facility] [address] [place] [address];  Service: Urology;  Laterality: Right;  6X24 stent    DEBRIDEMENT, ABDOMEN N/A 2023    Procedure: DEBRIDEMENT, ABDOMEN;  Surgeon: Lucio Sanchez DO;  Location: [facility] [address] [place] [address];  Service: Plastics;  Laterality: N/A;  abdominal wall, and fascia.    INSERTION OF URETERAL CATHETER Left 2023    Procedure: INSERTION, CATHETER, URETER, BILATERAL;  Surgeon: Felice Laureano MD;  Location: [facility] [address] [place] [address];  Service: Urology;  Laterality: Left;    LEFT HEART CATHETERIZATION Left 2022    Procedure: CATHETERIZATION, HEART, LEFT;  Surgeon: Yohannes Cordova MD;  [facility] C[facility] LAB;   Service: Cardiology;  Laterality: Left;    LYSIS OF ADHESIONS N/A 2/6/2023    Procedure: LYSIS, ADHESIONS;  Surgeon: Rafa Cardozo MD;  [facility];  Service: Colon and Rectal;  Laterality: N/A;    REVISION COLOSTOMY N/A 2/6/2023    Procedure: REVISION, COLOSTOMY;  Surgeon: Rafa Cardozo MD;  [facility];  Service: Colon and Rectal;  Laterality: N/A;    SUBTOTAL COLECTOMY  2/6/2023    Procedure: COLECTOMY, PARTIAL;  Surgeon: Rafa Cardozo MD;  [facility];  Service: Colon and Rectal;;       Review of patient's allergies indicates:   Allergen Reactions    Augmentin [amoxicillin-pot clavulanate] Swelling     Not allergic to amoxicillin just a derivative of augmentin    Lisinopril Other (See Comments)     Fluid around heart    Shellfish containing products Anaphylaxis     Pt.states she is allergic to SEAFOOD since the age of 13    Levaquin [levofloxacin] Other (See Comments)     Very bad joint pain    Clindamycin Palpitations     Chest pain       Current Facility-Administered Medications   Medication    acetaminophen tablet 1,000 mg    albuterol inhaler 2 puff    ALPRAZolam tablet 0.5 mg    aspirin EC tablet 81 mg    balsam peru-castor oiL Oint    clopidogreL tablet 75 mg    dextrose 10% bolus 125 mL 125 mL    dextrose 10% bolus 250 mL 250 mL    ezetimibe tablet 10 mg    famotidine tablet 20 mg    fluticasone propionate 50 mcg/actuation nasal spray 50 mcg    furosemide tablet 20 mg    magnesium sulfate 2g in water 50mL IVPB (premix)    metoprolol succinate (TOPROL-XL) 24 hr tablet 25 mg    naloxone 0.4 mg/mL injection 0.02 mg    nitroGLYCERIN SL tablet 0.4 mg    nystatin 100,000 unit/mL suspension 500,000 Units    ondansetron injection 4 mg    oxyCODONE immediate release tablet 5 mg    oxyCODONE immediate release tablet Tab 10 mg    polyethylene glycol packet 17 g    prochlorperazine injection Soln 5 mg    simethicone chewable  tablet 80 mg    sodium chloride 0.9% flush 10 mL    sodium chloride 0.9% flush 10 mL     Family History       Problem Relation (Age of Onset)    Emphysema Father    Heart attack Brother    Heart disease Mother          Tobacco Use    Smoking status: Former     Packs/day: 0.50     Types: Cigarettes     Start date: 2/4/1976     Quit date: 12/4/2012     Years since quitting: 10.2    Smokeless tobacco: Never   Substance and Sexual Activity    Alcohol use: No    Drug use: No    Sexual activity: Not on file     Review of Systems   Constitutional:  Positive for fatigue.   Gastrointestinal:  Positive for abdominal pain.   Objective:     Vital Signs (Most Recent):  Temp: 98.2 °F (36.8 °C) (02/22/23 1150)  Pulse: 98 (02/22/23 1150)  Resp: 20 (02/22/23 1150)  BP: (!) 106/55 (02/22/23 1150)  SpO2: 95 % (02/22/23 1150)   Vital Signs (24h Range):  Temp:  [98 °F (36.7 °C)-99 °F (37.2 °C)] 98.2 °F (36.8 °C)  Pulse:  [82-98] 98  Resp:  [15-20] 20  SpO2:  [93 %-98 %] 95 %  BP: ()/(49-56) 106/55     No data found.  Body mass index is 21.43 kg/m².      Intake/Output Summary (Last 24 hours) at 2/22/2023 1232  Last data filed at 2/22/2023 1154  Gross per 24 hour   Intake 640 ml   Output 550 ml   Net 90 ml       Physical Exam  Constitutional:       Appearance: She is ill-appearing.      Comments: Frail appearing    HENT:      Head: Normocephalic and atraumatic.   Cardiovascular:      Rate and Rhythm: Normal rate.   Pulmonary:      Comments: Crackles noted in bases  Abdominal:      Tenderness: There is abdominal tenderness.      Comments: Multiple dressing on abd wall, colostomy bag   Musculoskeletal:      Right lower leg: No edema.      Left lower leg: No edema.   Neurological:      General: No focal deficit present.       Significant Labs:  CBC:  Recent Labs   Lab 02/18/23  0557 02/20/23  0226   WBC 4.99 4.70   RBC 4.48 4.08   HGB 10.5* 9.6*   HCT 35.1* 32.0*    405   MCV 78* 78*   MCH 23.4* 23.5*   MCHC 29.9* 30.0*      BNP:  Recent Labs   Lab 02/18/23  0557   *     CMP:  Recent Labs   Lab 02/20/23  0226 02/21/23  1318 02/22/23  0449    98 92   CALCIUM 8.8 8.8 8.9   * 132* 132*   K 4.0 4.4 4.5   CO2 31* 29 28   CL 93* 94* 95   BUN 11 13 13   CREATININE 0.6 0.7 0.7      Coagulation:   No results for input(s): PT, INR, APTT in the last 168 hours.  LDH:  No results for input(s): LDH in the last 72 hours.  Microbiology:  Microbiology Results (last 7 days)       Procedure Component Value Units Date/Time    Urine Culture High Risk [[id number]]     Order Status: Canceled Specimen: Urine             I have reviewed all pertinent labs within the past 24 hours.    Diagnostic Results:  I have reviewed all pertinent imaging results/findings within the past 24 hours.    Assessment/Plan:     Ischemic cardiomyopathy  67 year old with ICM/HFrEF requiring inpatient diuresis 3-4x over the past year. Complex abd surgical history. [facility] consulted for advanced options candidacy and GDMT management     - given her frailty, malnutrition, debility and numerous abd surgeries, does not appear to be candidate for advanced options. spoke with CTS who will also assess patients candidacy - awaiting CTS staff attestation   - unfortunately unable to tolerate GDMT, can continue low dose toprol and attempt restarting further GDMT if BP improves outpatient or with rehab   - If CTS staff agrees that patient is not an advanced candidate, recommend palliative care consult      Results for orders placed during the hospital encounter of 12/22/22    Echo Saline Bubble? No    Interpretation Summary  · The left ventricle is severely enlarged with eccentric hypertrophy and severely decreased systolic function. The estimated ejection fraction is 10%. LVEDD 6.95  · Normal right ventricular size with mildly reduced right ventricular systolic function.  · Grade II left ventricular diastolic dysfunction.  · Biatrial enlargement.  · The estimated PA systolic  pressure is 51 mmHg.  · Normal central venous pressure (3 mmHg).          Evens Talley MD  Heart Transplant  [facility]

## 2023-02-22 NOTE — ASSESSMENT & PLAN NOTE
67 year old with ICM/HFrEF requiring inpatient diuresis 3-4x over the past year. Complex abd surgical history. [facility] consulted for advanced options candidacy and GDMT management     - given her frailty, malnutrition, debility and numerous abd surgeries, does not appear to be candidate for advanced options. spoke with CTS who will also assess patients candidacy - awaiting CTS staff attestation   - unfortunately unable to tolerate GDMT, can continue low dose toprol and attempt restarting further GDMT if BP improves outpatient or with rehab   - If CTS staff agrees that patient is not an advanced candidate, recommend palliative care consult      Results for orders placed during the hospital encounter of 12/22/22    Echo Saline Bubble? No    Interpretation Summary  · The left ventricle is severely enlarged with eccentric hypertrophy and severely decreased systolic function. The estimated ejection fraction is 10%. LVEDD 6.95  · Normal right ventricular size with mildly reduced right ventricular systolic function.  · Grade II left ventricular diastolic dysfunction.  · Biatrial enlargement.  · The estimated PA systolic pressure is 51 mmHg.  · Normal central venous pressure (3 mmHg).

## 2023-02-22 NOTE — PROGRESS NOTES
[facility]  Adult Nutrition  Progress Note    SUMMARY       Recommendations    1) Continue regular diet   -Monitor tolerance  - Honor food preferences   2) Continue Boost Plus TID  3) RD following    Goals: meets % een/epn by next rd f/u  Nutrition Goal Status: progressing towards goal  Communication of RD Recs: other (comment) (POC)    Assessment and Plan    Nutrition Problem  Increased nutritional needs     Related to (etiology):   Condition associated with diagnoses     Signs and Symptoms (as evidenced by):   Wounds, GI intolerance      Interventions   Collaboration with other providers  [employer]     Nutrition Diagnosis Status:   Continues        Reason for Assessment    Reason For Assessment: RD follow-up  Diagnosis: gastrointestinal disease (Colocutaneous fistula)  Relevant Medical History: HTN, CAD , HFrEH, MDD, severe malnutrition, cholecystectomy  Interdisciplinary Rounds: did not attend  General Information Comments: Pt seen for f/u, eating bites of meals. Preferences added to my dining. Endorses pain when eating regular diet. Endorses nausea, no vomiting. Showed RD limited output via ileostomy. Drinking vanilla boost during f/u. BMI 21.97. Wts stable. Noted w/ surgical wound to abdomen. NFPE not warranted at this time. RD following.    Follow up 2/22/2023:  Patient with extensive GI complications and procedure.  Patient previous deemed positive for malnutrition.  Patient continue on a regular diet with average meal consumption between %.  Oral supplements previous ordered.  Patient with some abdominal pain.  Labs reviewed.  Allergies: shellfish.  RD to continue to encourage po intake and monitor tolerance, labs and weights.            Nutrition Risk Screen    Nutrition Risk Screen: reduced oral intake over the last month, unintentional loss of 10 lbs or more in the past 2 months    Nutrition/Diet History    Spiritual, Cultural Beliefs, [employer]ism Practices, Values that Affect  "Care: no  Food Allergies: shellfish    Anthropometrics    Temp: 98.2 °F (36.8 °C)  Height Method: Stated  Height: 5' 3" (160 cm)  Height (inches): 63 in  Weight Method: Standard Scale  Weight: 54.9 kg (121 lb)  Weight (lb): 121 lb  Ideal Body Weight (IBW), Female: 115 lb  % Ideal Body Weight, Female (lb): 107.83 %  BMI (Calculated): 21.4       Lab/Procedures/Meds    Pertinent Labs Reviewed: reviewed  Pertinent Labs Comments:   BMP  Lab Results   Component Value Date     (L) 02/22/2023    K 4.5 02/22/2023    CL 95 02/22/2023    CO2 28 02/22/2023    BUN 13 02/22/2023    CREATININE 0.7 02/22/2023    CALCIUM 8.9 02/22/2023    ANIONGAP 9 02/22/2023    EGFRNORACEVR >60.0 02/22/2023     Lab Results   Component Value Date    WBC 4.70 02/20/2023    HGB 9.6 (L) 02/20/2023    HCT 32.0 (L) 02/20/2023    MCV 78 (L) 02/20/2023     02/20/2023       Lab Results   Component Value Date    HGBA1C 5.0 08/26/2022        Pertinent Medications Reviewed: reviewed  Pertinent Medications Comments:   Scheduled Meds:   aspirin  81 mg Oral Daily    balsam peru-castor oiL   Topical (Top) BID    clopidogreL  75 mg Oral Daily    ezetimibe  10 mg Oral Daily    famotidine  20 mg Oral BID    furosemide  20 mg Oral Daily    metoprolol succinate  25 mg Oral Daily    nystatin  500,000 Units Oral QID    polyethylene glycol  17 g Oral Daily     Continuous Infusions:          Estimated/Assessed Needs    Weight Used For Calorie Calculations: 56.2 kg (124 lb) (Actual BW)  Energy Calorie Requirements (kcal): 6318-2114 (20-25 Kcal/kg (actual BW))  Energy Need Method: Kcal/kg  Protein Requirements: 73-84 (1.2-1.4 g/kg (actual BW))  Weight Used For Protein Calculations: 56.2 kg (124 lb) (acutal BW)        RDA Method (mL): 1124         Nutrition Prescription Ordered    Current Diet Order: regular diet  Oral Nutrition Supplement: Boost plus    Evaluation of Received Nutrient/Fluid Intake    I/O: -13.9 L since 2/1  Energy Calories Required: meeting " needs  Protein Required: meeting needs  Comments: 2/22 ileostomy  Tolerance: tolerance improving   % Intake of Estimated Energy Needs: 50 - 75 %  % Meal Intake: 50 - 75 %    Nutrition Risk    Level of Risk/Frequency of Follow-up: low (1x/week)     Monitor and Evaluation    Food and Nutrient Intake: energy intake, food and beverage intake  Food and Nutrient Adminstration: diet order  Knowledge/Beliefs/Attitudes: food and nutrition knowledge/skill, beliefs and attitudes  Physical Activity and Function: nutrition-related ADLs and IADLs  Anthropometric Measurements: height/length, weight, weight change, body mass index  Biochemical Data, Medical Tests and Procedures: electrolyte and renal panel, gastrointestinal profile, glucose/endocrine profile, inflammatory profile, lipid profile  Nutrition-Focused Physical Findings: overall appearance     Nutrition Follow-Up    RD Follow-up?: Yes    Suri Rivers, MS, RDN, LDN

## 2023-02-23 NOTE — PROGRESS NOTES
[facility]  Progress Note    Patient Name: Odette Hart  MRN: [id number]  Patient Class: IP- Inpatient   Admission Date: 1/31/2023  Length of Stay: 23 days  Attending Physician: Rafa Cardozo MD  Primary Care Provider: Braxton Barragan MD        Subjective:     Principal Problem:Colocutaneous fistula        HPI:  66 yo F with HTN, HLD (statin intolerance), DM, CAD s/p PCI to LAD (2012)/LCX (1/2022), HFrEF (EF10%), debility, severe malnutrition, and the following procedures performed 5/2022: cholecystectomy, takedown of colovesical and colovaginal fistula, sigmoid colectomy, partial cecectomy, end colostomy who presents for colocutaneous fistula repair with abdominal wall reconstruction. On admission she was found to be hyponatremic and in acute on chronic decompensated heart failure. Cardiology was consulted to manage her HF and have since signed off as her volume status has become more euvolemic following diuresis. Medicine was consulted for pr-eop evaluation and continued management of her hyponatremia. Per chart review patient is chronically, mildly hyponatremic at baseline. She takes Lasix, Toprol, spironolactone, and losartan for her HFrEF. She reports correct use of her home medications. She states her diet consists of peanut butter and crackers. Denies CP/SOB/nausea vomiting, cough fever chills, polydipsia. Her Na has been improving with diuresis. Patient has multiple risk factors that complicate surgery: acute on chronic decompensated HF, CAD with PCIx2, hypertension, hyperlipidemia, malnutrition. This being said she does not have reported/documented/observed dysfunction of liver, kidney, lung, clotting.    On examination, she is chronically ill appearing/in no acute distress/at mental baseline. AF, /58 (78), sat 98% on RA, no leukocytosis, hgb 11.8, Hct 38.5, Plts 237, moderate Hyponatremia Na 124 (initially 122 on admission) with labile Baseline, K 3.1, Cl 85, Bun 13,  Cr 00.8, Ca 7.9, albumin 2-3. BNP 2600, UOP 2300 on lasix 80mg    CXR with mild pulmonary congestion      Overview/Hospital Course:  Admitted for planned colocutaneous fistula repair, found to be hyponatremic and in ADHF. Started on IV diuresis, >6 L UOP, returned to euvolemia. Urine/serum studies consistent with hypervolemic hyponatremia most likely in 2/2 to acute on chronic HF. She does have multiple factors that maker her high risk for surgery, primarily her severrely reduced LVEF 10%. Cardiology and medicine consulted for pre-operative evaluation. Agree she is high risk, but need of her procedure outweigh the risks. 2/6 Patient tolerated colocutaneous takedown/reconstruction well. Her Hyponatremia has resolved with Lasix 80mg BID. S/p colocutaneous fistula revision with intra-op R ureteral stent placement for hydronephrosis. Developed post-op ileus. Given lasix holiday for over diuresis. Plavix, ASA restarted post-op. Anticipate discharge on prior home PO lasix dosing.     Complaint of dysuria and dark cloudy urine noted by primary team, patient started on cipro then transitioned to cefpodoxime (broader coverage) given culture results of multiple organisms none in predominance (thus an unequivocal UCx).  Recently discontinued from PCA pump with PRN pain management of oxy 5 and 10 mg. Restarted lasix 20 mg on 2/14/23. CRS monitoring surgical site closely, some output of serous fluid, no signs of cellulitis. Continued symptomatic hypotension despite discontinuing GDMT. CTS and HTS both evaluated patient and deemed there are likely no advanced options given debility and age. Recommended palliative care consultation which was relayed to primary team.    Patient with new onset hematuria and reported dysuira. Call to lab concerning UA completed which demonstrated WBC clumps and no WBC was addend to show just hematuria. Urology was contacted given recent stent placement and plan to evaluate patient. Repeat CT ordered  by CRS which demonstrated likely fluid collection for which CRS plans to manage with continued PO Moxifloxacin. Will monitor for tolerance      Interval History: See above. Patient started on Moxifloxacin based on CT findings after CRS discussed with ID. Will check for tolerance. Ongoing symptomatic hypotension. Euvolemic on examination.     Review of Systems   Constitutional:  Negative for chills and fever.   HENT:  Negative for sinus pain.    Eyes:  Negative for pain and visual disturbance.   Respiratory:  Negative for cough and shortness of breath.    Cardiovascular:  Negative for chest pain, palpitations and leg swelling.   Gastrointestinal:  Negative for blood in stool, diarrhea and vomiting.   Genitourinary:  Positive for dysuria and hematuria. Negative for difficulty urinating.   Neurological:  Positive for dizziness, weakness and light-headedness.   Objective:     Vital Signs (Most Recent):  Temp: 97.2 °F (36.2 °C) (02/23/23 1157)  Pulse: 93 (02/23/23 1157)  Resp: 20 (02/23/23 1157)  BP: (!) 107/51 (02/23/23 1157)  SpO2: 99 % (02/23/23 1157)   Vital Signs (24h Range):  Temp:  [97.2 °F (36.2 °C)-99.7 °F (37.6 °C)] 97.2 °F (36.2 °C)  Pulse:  [88-99] 93  Resp:  [16-20] 20  SpO2:  [91 %-99 %] 99 %  BP: ()/(43-58) 107/51     Weight: 54.9 kg (121 lb)  Body mass index is 21.43 kg/m².    Intake/Output Summary (Last 24 hours) at 2/23/2023 1406  Last data filed at 2/23/2023 1255  Gross per 24 hour   Intake 120 ml   Output 865 ml   Net -745 ml        Physical Exam  Vitals and nursing note reviewed.   Constitutional:       General: She is not in acute distress.     Appearance: She is ill-appearing.   HENT:      Head: Normocephalic and atraumatic.      Right Ear: External ear normal.      Left Ear: External ear normal.      Nose: No congestion or rhinorrhea.   Eyes:      General:         Right eye: No discharge.         Left eye: No discharge.      Extraocular Movements: Extraocular movements intact.      Pupils:  Pupils are equal, round, and reactive to light.   Cardiovascular:      Rate and Rhythm: Normal rate and regular rhythm.      Heart sounds: Normal heart sounds. No murmur heard.  Pulmonary:      Effort: No respiratory distress.      Breath sounds: No wheezing or rales (bibasilar).   Abdominal:      General: There is no distension.      Comments: Colostomy, vertical/midline abdominal excision, bandage clean/dry   Musculoskeletal:         General: No tenderness.      Cervical back: Normal range of motion. No rigidity.      Right lower leg: No edema.      Left lower leg: No edema.   Skin:     Findings: Bruising present. No erythema.   Neurological:      General: No focal deficit present.      Mental Status: She is alert and oriented to person, place, and time.   Psychiatric:         Mood and Affect: Mood normal.         Behavior: Behavior normal.       Significant Labs: All pertinent labs within the past 24 hours have been reviewed.  CBC:   Recent Labs   Lab 02/23/23  0636   WBC 2.59*   HGB 9.6*   HCT 31.1*        CMP:   Recent Labs   Lab 02/22/23  0449 02/23/23  0636   * 130*   K 4.5 4.1   CL 95 92*   CO2 28 31*   GLU 92 104   BUN 13 12   CREATININE 0.7 0.7   CALCIUM 8.9 8.6*   ANIONGAP 9 7*       Urine Culture: No results for input(s): LABURIN in the last 48 hours.  Urine Studies:   Recent Labs   Lab 02/22/23  0134   COLORU Red*   APPEARANCEUA Ex.Turbid   PHUR 7.0   SPECGRAV 1.020   PROTEINUA 2+*   GLUCUA Negative   KETONESU Trace*   BILIRUBINUA Negative   OCCULTUA 3+*   NITRITE Negative   LEUKOCYTESUR 1+*   RBCUA >100*   WBCUA 0   BACTERIA None   HYALINECASTS 0         Significant Imaging: I have reviewed all pertinent imaging results/findings within the past 24 hours.      Assessment/Plan:      * Colocutaneous fistula  S/p colocutaneous fistula revision with intra-op R ureteral stent placement for hydronephrosis. Developed post-op ileus.   Required post-op PCA pump now discontinued and on PRN  opioids    Repeat CT AP demonstrated fluid collection and CRP increased as well. Discussion with CRS with plan for starting PO moxifloxacin and if worsening symptoms or fevers likely need escalation to IV abx and ID consultation given allergies and limited options.     - oxy Q4 PRN and continue bowel regimen  - Continue Wound care  - per primary     Gross hematuria  Seen by urology and stated that hematuria 2/2 stent placement. No acute intervention.     Continue to monitor and follow up UPCR for proteinuria on UA as well      Ileus  Patient has Post-operative ileus which is adynamic in etiology which is improving. This is inherent to her procedure/medications. Will treat conservatively with bowel rest, IV fluids, serial abdominal exams and avoidance of GI paralytics such as narcotics or anti-spasmodics. Monitor patient closely.     Diet advanced 2/12; resolved    Severe protein-calorie malnutrition  Nutrition consulted. Most recent weight and BMI monitored-                         Measurements:  Wt Readings from Last 1 Encounters:   02/14/23 54.9 kg (121 lb)   Body mass index is 21.43 kg/m².    Recommendations: Recommendation/Intervention: 1) Continue clear liquid   2) Continue Boost Plus & Boost Breeze  3) RD following  Goals: meets % een/epn by next rd f/u    Patient has been screened and assessed by RD. RD will follow patient.      Hyponatremia  Chronic mild hyponatremia on chart review, now moderate on presentation  Found to be in ADHF on arrival  BNP 2600, volume overloaded, JVD present, bilateral LE edema on consultation  CXR with mild pulmonary congestion  Reports poor diet at home (crackers/peanutbutter)  Reduced albumin likely 2/2 to malnutrition    Previously due to ADHF. Stable at 130-132 but concern for secondary cause of hypovolemic hyponatremia as patient has had poor PO intake 2/2 abdominal pain    - strict I/Os  - daily CMP  - replace electrolytes        Hypertension  Now Hypotension.   Intermittent difficulties with hypotension while inpatient. Unable to tolerate full HFrEF GDMT.   Continued hypotension while admitted and symptomatic with ambulation. Will continue to titrate.     - Hold GDMT if SBP<95  - encourage PO intake     Hypercholesterolemia  Lipids well controlled  Has documented statin intolerance    - continue ezetimibe    Coronary artery disease  History of Cad with PCIx2 LAD in 2012 and LCX 1/2022  Home regimen: DAPT    No further surgical intervention planned inpatient by CRS or urology.   Reported episode of chest pain 2/19 that was reproducible with palpation on examination and no reported episodes while patient ambulating and working with PT though LH and dizziness did occur. EKG and trop reassuring.    -  aspirin  - clopidogrel  -  BB as tolerated  - no statin 2/2 intolerance    Ischemic cardiomyopathy  Cardiology consulted for management of ADHF, now signed off  Recent echo showed EF of 10%  Patient hypervolemic on presentation, appears more euvolemic today  Home GDMT: Toprol-XL 25, losartan 25 mg.  Initial CXR without new detrimental changes since prior, BNP 2600 on arrival  1x IV lasix on 2/14. Resolved now on PO lasix 20 mg.    2/23-discussion with patient and with primary about GOC and recommendation for palliative consultation. Patient reports whats most important is extending life even if that means repeated admissions. Patient reports being told previously by cardiology about hospice/palliative and was not receptive at that time. Stated she would consider changing viewpoint if she cannot leave the hospital         Recommendations:   - HTS and CTS evaluated patient with recommendations against any advanced options given age and debility. Will continue to attempt GDMT titration as tolerated currently only on BB therapy  - continue lasix 20 mg daily PO (half of home dose 40)  - replete Mag>2, K>4  - Toprol-XL 25 mg increased with hold parameters  - DC losartan 12.5 mg qd  given hypotension   - avoid IV anti-hypertensive  - holding home spironolactone, unable to tolerate full HFrEF GDMT      VTE Risk Mitigation (From admission, onward)         Ordered     Place sequential compression device  Until discontinued         02/06/23 1447     IP VTE HIGH RISK PATIENT  Once         02/06/23 1447                Discharge Planning   SIVAN: 2/27/2023     Code Status: Full Code   Is the patient medically ready for discharge?:     Reason for patient still in hospital (select all that apply): Pending disposition  Discharge Plan A: Home Health   Discharge Delays: (!) Diet Not Ready for Discharge              Ml Lagos DO  Department of Hospital Medicine   [address] - [place]

## 2023-02-23 NOTE — ASSESSMENT & PLAN NOTE
S/p colocutaneous fistula revision with intra-op R ureteral stent placement for hydronephrosis. Developed post-op ileus.   Required post-op PCA pump now discontinued and on PRN opioids    Repeat CT AP demonstrated fluid collection and CRP increased as well. Discussion with CRS with plan for starting PO moxifloxacin and if worsening symptoms or fevers likely need escalation to IV abx and ID consultation given allergies and limited options.     - oxy Q4 PRN and continue bowel regimen  - Continue Wound care  - per primary

## 2023-02-23 NOTE — NURSING
Scheduled Metoprolol and  Lasix not given due to bp; communicated to Dr. Dalal whom is in agreement. Also will hold pain PRN admin due to bp. Will recheck bp within next hour or so. Will continue to monitor patient.

## 2023-02-23 NOTE — ASSESSMENT & PLAN NOTE
Chronic mild hyponatremia on chart review, now moderate on presentation  Found to be in ADHF on arrival  BNP 2600, volume overloaded, JVD present, bilateral LE edema on consultation  CXR with mild pulmonary congestion  Reports poor diet at home (crackers/peanutbutter)  Reduced albumin likely 2/2 to malnutrition    Previously due to ADHF. Stable at 130-132 but concern for secondary cause of hypovolemic hyponatremia as patient has had poor PO intake 2/2 abdominal pain    - strict I/Os  - daily CMP  - replace electrolytes

## 2023-02-23 NOTE — SUBJECTIVE & OBJECTIVE
Interval History: See above. Patient started on Moxifloxacin based on CT findings after CRS discussed with ID. Will check for tolerance. Ongoing symptomatic hypotension. Euvolemic on examination.     Review of Systems   Constitutional:  Negative for chills and fever.   HENT:  Negative for sinus pain.    Eyes:  Negative for pain and visual disturbance.   Respiratory:  Negative for cough and shortness of breath.    Cardiovascular:  Negative for chest pain, palpitations and leg swelling.   Gastrointestinal:  Negative for blood in stool, diarrhea and vomiting.   Genitourinary:  Positive for dysuria and hematuria. Negative for difficulty urinating.   Neurological:  Positive for dizziness, weakness and light-headedness.   Objective:     Vital Signs (Most Recent):  Temp: 97.2 °F (36.2 °C) (02/23/23 1157)  Pulse: 93 (02/23/23 1157)  Resp: 20 (02/23/23 1157)  BP: (!) 107/51 (02/23/23 1157)  SpO2: 99 % (02/23/23 1157)   Vital Signs (24h Range):  Temp:  [97.2 °F (36.2 °C)-99.7 °F (37.6 °C)] 97.2 °F (36.2 °C)  Pulse:  [88-99] 93  Resp:  [16-20] 20  SpO2:  [91 %-99 %] 99 %  BP: ()/(43-58) 107/51     Weight: 54.9 kg (121 lb)  Body mass index is 21.43 kg/m².    Intake/Output Summary (Last 24 hours) at 2/23/2023 1406  Last data filed at 2/23/2023 1255  Gross per 24 hour   Intake 120 ml   Output 865 ml   Net -745 ml        Physical Exam  Vitals and nursing note reviewed.   Constitutional:       General: She is not in acute distress.     Appearance: She is ill-appearing.   HENT:      Head: Normocephalic and atraumatic.      Right Ear: External ear normal.      Left Ear: External ear normal.      Nose: No congestion or rhinorrhea.   Eyes:      General:         Right eye: No discharge.         Left eye: No discharge.      Extraocular Movements: Extraocular movements intact.      Pupils: Pupils are equal, round, and reactive to light.   Cardiovascular:      Rate and Rhythm: Normal rate and regular rhythm.      Heart sounds: Normal  heart sounds. No murmur heard.  Pulmonary:      Effort: No respiratory distress.      Breath sounds: No wheezing or rales (bibasilar).   Abdominal:      General: There is no distension.      Comments: Colostomy, vertical/midline abdominal excision, bandage clean/dry   Musculoskeletal:         General: No tenderness.      Cervical back: Normal range of motion. No rigidity.      Right lower leg: No edema.      Left lower leg: No edema.   Skin:     Findings: Bruising present. No erythema.   Neurological:      General: No focal deficit present.      Mental Status: She is alert and oriented to person, place, and time.   Psychiatric:         Mood and Affect: Mood normal.         Behavior: Behavior normal.       Significant Labs: All pertinent labs within the past 24 hours have been reviewed.  CBC:   Recent Labs   Lab 02/23/23  0636   WBC 2.59*   HGB 9.6*   HCT 31.1*        CMP:   Recent Labs   Lab 02/22/23  0449 02/23/23  0636   * 130*   K 4.5 4.1   CL 95 92*   CO2 28 31*   GLU 92 104   BUN 13 12   CREATININE 0.7 0.7   CALCIUM 8.9 8.6*   ANIONGAP 9 7*       Urine Culture: No results for input(s): LABURIN in the last 48 hours.  Urine Studies:   Recent Labs   Lab 02/22/23  0134   COLORU Red*   APPEARANCEUA Ex.Turbid   PHUR 7.0   SPECGRAV 1.020   PROTEINUA 2+*   GLUCUA Negative   KETONESU Trace*   BILIRUBINUA Negative   OCCULTUA 3+*   NITRITE Negative   LEUKOCYTESUR 1+*   RBCUA >100*   WBCUA 0   BACTERIA None   HYALINECASTS 0         Significant Imaging: I have reviewed all pertinent imaging results/findings within the past 24 hours.

## 2023-02-23 NOTE — ASSESSMENT & PLAN NOTE
Cardiology consulted for management of ADHF, now signed off  Recent echo showed EF of 10%  Patient hypervolemic on presentation, appears more euvolemic today  Home GDMT: Toprol-XL 25, losartan 25 mg.  Initial CXR without new detrimental changes since prior, BNP 2600 on arrival  1x IV lasix on 2/14. Resolved now on PO lasix 20 mg.    2/23-discussion with patient and with primary about GOC and recommendation for palliative consultation. Patient reports whats most important is extending life even if that means repeated admissions. Patient reports being told previously by cardiology about hospice/palliative and was not receptive at that time. Stated she would consider changing viewpoint if she cannot leave the hospital         Recommendations:   - HTS and CTS evaluated patient with recommendations against any advanced options given age and debility. Will continue to attempt GDMT titration as tolerated currently only on BB therapy  - continue lasix 20 mg daily PO (half of home dose 40)  - replete Mag>2, K>4  - Toprol-XL 25 mg increased with hold parameters  - DC losartan 12.5 mg qd given hypotension   - avoid IV anti-hypertensive  - holding home spironolactone, unable to tolerate full HFrEF GDMT

## 2023-02-23 NOTE — ASSESSMENT & PLAN NOTE
Seen by urology and stated that hematuria 2/2 stent placement. No acute intervention.     Continue to monitor and follow up UPCR for proteinuria on UA as well

## 2023-02-23 NOTE — PLAN OF CARE
Problem: Adult Inpatient Plan of Care  Goal: Plan of Care Review  Outcome: Ongoing, Progressing  Flowsheets (Taken 2/23/2023 5548)  Plan of Care Reviewed With: patient  Goal: Optimal Comfort and Wellbeing  Outcome: Ongoing, Progressing  Intervention: Provide Person-Centered Care  Flowsheets (Taken 2/23/2023 7053)  Trust Relationship/Rapport:   care explained   reassurance provided   thoughts/feelings acknowledged

## 2023-02-23 NOTE — PROGRESS NOTES
[facility]  Colorectal Surgery  Progress Note    Patient Name: Odette Hart  MRN: [id number]  Admission Date: 1/31/2023  Hospital Length of Stay: 23 days  Attending Physician: Rafa Cardozo MD    Subjective:     Interval History:   No acute events overnight. This AM pt feels well overall. Tolerating diet without nausea/emesis.     Post-Op Info:  Procedure(s) (LRB):  REVISION, COLOSTOMY (N/A)  INSERTION, CATHETER, URETER, BILATERAL (Left)  CYSTOSCOPY, WITH URETERAL STENT INSERTION (Right)  DEBRIDEMENT, ABDOMEN (N/A)  COLECTOMY, PARTIAL  LYSIS, ADHESIONS (N/A)   17 Days Post-Op      Medications:  Continuous Infusions:  Scheduled Meds:   aspirin  81 mg Oral Daily    balsam peru-castor oiL   Topical (Top) BID    ciprofloxacin HCl  500 mg Oral Q12H    clopidogreL  75 mg Oral Daily    ezetimibe  10 mg Oral Daily    famotidine  20 mg Oral BID    furosemide  20 mg Oral Daily    metoprolol succinate  25 mg Oral Daily    metroNIDAZOLE  500 mg Oral Q8H    nystatin  500,000 Units Oral QID    polyethylene glycol  17 g Oral Daily     PRN Meds:   acetaminophen    albuterol    ALPRAZolam    dextrose 10%    dextrose 10%    fluticasone propionate    naloxone    nitroGLYCERIN    ondansetron    oxyCODONE    oxyCODONE    prochlorperazine    simethicone    sodium chloride 0.9%    sodium chloride 0.9%        Objective:     Vital Signs (Most Recent):  Temp: 98.3 °F (36.8 °C) (02/23/23 0806)  Pulse: 89 (02/23/23 0806)  Resp: 18 (02/23/23 0806)  BP: (!) 84/46 (02/23/23 0806)  SpO2: 96 % (02/23/23 0806)   Vital Signs (24h Range):  Temp:  [98.2 °F (36.8 °C)-99.7 °F (37.6 °C)] 98.3 °F (36.8 °C)  Pulse:  [89-99] 89  Resp:  [15-20] 18  SpO2:  [94 %-97 %] 96 %  BP: ()/(43-58) 84/46     Intake/Output - Last 3 Shifts         02/21 0700  02/22 0659 02/22 0700 02/23 0659 02/23 0700 02/24 0659    P.O. 950 600     Total Intake(mL/kg) 950 (17.3) 600 (10.9)     Urine (mL/kg/hr) 400 (0.3) 700 (0.5)     Stool 100 50 15    Total  Output 500 750 15    Net +450 -150 -15           Urine Occurrence 300 x      Stool Occurrence 1 x 1 x             Physical Exam  General: Alert, oriented, in no apparent distress  HEENT: Sclera anicteric, trachea midline  Lungs: Normal respiratory rate and effort on room air  Abdomen: Soft, minimal renata-incisional TTP, nondistended. Midline incision packed with iodoform superiorly/inferiorly with improving superior renata-incisional erythema and resolved inferior erythema, persistent small volume old hematoma drainage with dressing changes. LUQ end colostomy pink and well perfused with soft stool and flatus in bag.  Extremities: Warm, well perfused, no edema  Neuro: Grossly intact, moves all extremities  Psych: Appropriate affect       Significant Labs:  BMP:   Recent Labs   Lab 02/23/23  0636      *   K 4.1   CL 92*   CO2 31*   BUN 12   CREATININE 0.7   CALCIUM 8.6*   MG 2.0     CBC:   Recent Labs   Lab 02/23/23  0636   WBC 2.59*   RBC 3.99*   HGB 9.6*   HCT 31.1*      MCV 78*   MCH 24.1*   MCHC 30.9*       Significant Diagnostics:   CT Abd/Pelvis (2/22/2023):   1. Organized rim enhancing fluid collection along the right anterior abdominal wall with extension through the abdominal wall into the subcutaneous soft tissues.  The collection abuts the ascending and transverse colon.  2. Continued suspected small bowel to rectal fistula.  3. Stable splenic infarction.  4. Right-sided ureteral stent in place.  No hydronephrosis.  5. Cardiomegaly.  6. 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] 12/22/2022 Yes    Gross hematuria [R31.0] 02/22/2023 No    Ileus [K56.7] 02/10/2023 No    Severe protein-calorie malnutrition [E43] 07/18/2022 Yes    Hyponatremia [E87.1] 05/15/2022 Yes    Hypertension [I10] 01/24/2022  Yes    Hypercholesterolemia [E78.00] 01/22/2022 Yes    Coronary artery disease [I25.10] 04/19/2021 Yes    Ischemic cardiomyopathy [I25.5] 02/04/2019 Yes    Automatic implantable cardioverter-defibrillator in situ [Z95.810] 02/04/2019 Yes      Problems Resolved During this Admission:    Diagnosis Date Noted Date Resolved POA    Acute cystitis with hematuria [N30.01] 02/13/2023 02/17/2023 Unknown    Preop examination [Z01.818] 02/03/2023 02/06/2023 Not Applicable    Hypotension [I95.9] 05/19/2022 02/06/2023 Yes     Odette Hart is a 67 y.o. year old female with history of HTN, CAD s/p PCI c/b HFrEF (EF 10%), MDD, debility, severe malnutrition, and open cholecystectomy and takedown of colovesical and colovaginal fistuli (two separate areas) with sigmoid colectomy, partial cecectomy and appendectomy, drainage of intra-abdominal abscess and end colostomy 5/9/2022 recently admitted 12/22/22 - 12/30/22 for new colocutaneous fistula admitted 1/32/23 with incarcerated colonic prolapse from her fistula s/p manual reduction now s/p open transverse/descending colectomy with takedown of colocutaneous fistula and end colostomy + parastomal hernia repair + lysis of adhesions >60 minutes (Juliane) + debridement of skin/subcutaneos fat/fascia/muscle (Sanchez) 2/7/23, postoperative course complicated by initial ileus since resolved, midline wound hematoma s/p bedside evacuation with cotton-tipped applicator and subsequent iodoform packing, persistent malnutrition (prealbumin 7), and hematuria likely from JJ ureteral stent in setting of ASA/Plavix/LVX.     - CT abd/pelvis with abdominal wall fluid collection, which is likely source of drainage from midline wound  - BID iodoform dressing changes  - Started on Cipro/Flagyl, however pt states she cannot tolerate flagyl due to GI distress in the past. Allergic to Augmentin. Will discuss alternative options for abx today  - Holding LVX in setting of hematuria  - Encourage OOB/IS  -  Continue diet  - Discussed with attending Dr. Juliane MOFFETT MD  Colorectal Surgery  [facility]

## 2023-02-24 NOTE — PT/OT/SLP PROGRESS
Physical Therapy Treatment    Patient Name:  Odette Hart   MRN:  [id number]    Recommendations:     Discharge Recommendations: home health PT  Discharge Equipment Recommendations: none  Barriers to discharge: None    Assessment:     Odette Hart is a 67 y.o. female admitted with a medical diagnosis of Colocutaneous fistula.  She presents with the following impairments/functional limitations: weakness, impaired endurance, impaired self care skills, impaired functional mobility, gait instability, impaired balance. Pt. cooperative and tolerated treatment well. Pt. progressing with mobility.    Rehab Prognosis: Good; patient would benefit from acute skilled PT services to address these deficits and reach maximum level of function.    Recent Surgery: Procedure(s) (LRB):  REVISION, COLOSTOMY (N/A)  INSERTION, CATHETER, URETER, BILATERAL (Left)  CYSTOSCOPY, WITH URETERAL STENT INSERTION (Right)  DEBRIDEMENT, ABDOMEN (N/A)  COLECTOMY, PARTIAL  LYSIS, ADHESIONS (N/A) 18 Days Post-Op    Plan:     During this hospitalization, patient to be seen 3 x/week to address the identified rehab impairments via gait training, therapeutic activities, therapeutic exercises and progress toward the following goals:    Plan of Care Expires:  03/09/23    Subjective     Chief Complaint: weakness  Patient/Family Comments/goals: pt. Agreeable to PT  Pain/Comfort:  Pain Rating 1:  (pt. did not rate)      Objective:     Communicated with nursing prior to session.  Patient found supine with PureWick, peripheral IV (ileostomy) upon PT entry to room.     General Precautions: Standard, airborne  Orthopedic Precautions: N/A  Braces: N/A  Respiratory Status: Room air     Functional Mobility:  Bed Mobility:     Rolling Right: stand by assistance  Scooting: stand by assistance  Supine to Sit: minimum assistance  Sit to Supine: stand by assistance  Transfers:     Sit to Stand:  stand by assistance with rolling walker  Gait: 60' and 160' with RW  and SBA with slow, steady gait. Pt. Had seated rest break between gait trials.  Balance: fair+      AM-PAC 6 CLICK MOBILITY  Turning over in bed (including adjusting bedclothes, sheets and blankets)?: 3  Sitting down on and standing up from a chair with arms (e.g., wheelchair, bedside commode, etc.): 3  Moving from lying on back to sitting on the side of the bed?: 3  Moving to and from a bed to a chair (including a wheelchair)?: 3  Need to walk in hospital room?: 3  Climbing 3-5 steps with a railing?: 2  Basic Mobility Total Score: 17       Treatment & Education:  Discussed pt.'s progress, goals, and POC.    Patient left supine with all lines intact and call button in reach..    GOALS:   Multidisciplinary Problems       Physical Therapy Goals          Problem: Physical Therapy    Goal Priority Disciplines Outcome Goal Variances Interventions   Physical Therapy Goal     PT, PT/OT Ongoing, Progressing     Description: Goals to be met by: 2/15/2023, extended to     Patient will increase functional independence with mobility by performin. Gait  x 250 feet with Supervision using LRAD.   2. Ascend/descend 4 stair with right Handrails Supervision using no Assistive device.   3. Sit to Stand with Supervision using LRAD                       Time Tracking:     PT Received On: 23  PT Start Time: 1014     PT Stop Time: 1044  PT Total Time (min): 30 min     Billable Minutes: Gait Training 30    Treatment Type: Treatment  PT/PTA: PT     PTA Visit Number: 0     2023

## 2023-02-24 NOTE — PROGRESS NOTES
[facility]  Colorectal Surgery  Progress Note    Patient Name: Odette Hart  MRN: [id number]  Admission Date: 1/31/2023  Hospital Length of Stay: 24 days  Attending Physician: Rafa Cardozo MD    Subjective:     Interval History:   No acute events overnight. This AM pt feels well overall. Tolerating diet without nausea/emesis. Good pain control. Ambulating with PT assistance    Post-Op Info:  Procedure(s) (LRB):  REVISION, COLOSTOMY (N/A)  INSERTION, CATHETER, URETER, BILATERAL (Left)  CYSTOSCOPY, WITH URETERAL STENT INSERTION (Right)  DEBRIDEMENT, ABDOMEN (N/A)  COLECTOMY, PARTIAL  LYSIS, ADHESIONS (N/A)   18 Days Post-Op      Medications:  Continuous Infusions:  Scheduled Meds:   aspirin  81 mg Oral Daily    balsam peru-castor oiL   Topical (Top) BID    clopidogreL  75 mg Oral Daily    ezetimibe  10 mg Oral Daily    famotidine  20 mg Oral BID    furosemide  20 mg Oral Daily    metoprolol succinate  25 mg Oral Daily    moxifloxacin  400 mg Oral Daily    nystatin  500,000 Units Oral QID    polyethylene glycol  17 g Oral Daily     PRN Meds:   acetaminophen    albuterol    ALPRAZolam    dextrose 10%    dextrose 10%    fluticasone propionate    naloxone    nitroGLYCERIN    ondansetron    oxyCODONE    oxyCODONE    prochlorperazine    simethicone    sodium chloride 0.9%    sodium chloride 0.9%        Objective:     Vital Signs (Most Recent):  Temp: 99.3 °F (37.4 °C) (02/24/23 1118)  Pulse: 96 (02/24/23 1118)  Resp: 18 (02/24/23 1118)  BP: (!) 98/53 (02/24/23 1118)  SpO2: 100 % (02/24/23 1118)   Vital Signs (24h Range):  Temp:  [98.5 °F (36.9 °C)-99.3 °F (37.4 °C)] 99.3 °F (37.4 °C)  Pulse:  [] 96  Resp:  [16-20] 18  SpO2:  [95 %-100 %] 100 %  BP: ()/(46-63) 98/53     Intake/Output - Last 3 Shifts         02/22 0700  02/23 0659 02/23 0700  02/24 0659 02/24 0700  02/25 0659    P.O. 600      Total Intake(mL/kg) 600 (10.9)      Urine (mL/kg/hr) 700 (0.5)      Stool 50 265     Total Output 603 149      Net -150 -265            Stool Occurrence 1 x              Physical Exam  General: Alert, oriented, in no apparent distress  HEENT: Sclera anicteric, trachea midline  Lungs: Normal respiratory rate and effort on room air  Abdomen: Soft, minimal renata-incisional TTP, nondistended. Midline incision packed with iodoform superiorly/inferiorly with improving superior renata-incisional erythema and resolved inferior erythema, persistent small volume old hematoma drainage with dressing changes. LUQ end colostomy pink and well perfused with soft stool and flatus in bag.  Extremities: Warm, well perfused, no edema  Neuro: Grossly intact, moves all extremities  Psych: Appropriate affect       Significant Labs:  BMP:   Recent Labs   Lab 02/24/23  0321   GLU 95   *   K 4.1   CL 93*   CO2 30*   BUN 12   CREATININE 0.7   CALCIUM 8.8   MG 1.9     CBC:   Recent Labs   Lab 02/24/23  0613   WBC 2.64*   RBC 3.93*   HGB 9.3*   HCT 31.3*      MCV 80*   MCH 23.7*   MCHC 29.7*       Significant Diagnostics:   CT Abd/Pelvis (2/22/2023):   1. Organized rim enhancing fluid collection along the right anterior abdominal wall with extension through the abdominal wall into the subcutaneous soft tissues.  The collection abuts the ascending and transverse colon.  2. Continued suspected small bowel to rectal fistula.  3. Stable splenic infarction.  4. Right-sided ureteral stent in place.  No hydronephrosis.  5. Cardiomegaly.  6. 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] 12/22/2022 Yes    Gross hematuria [R31.0] 02/22/2023 No    Ileus [K56.7] 02/10/2023 No    Severe protein-calorie malnutrition [E43] 07/18/2022 Yes    Hyponatremia [E87.1] 05/15/2022 Yes    Hypertension [I10] 01/24/2022 Yes    Hypercholesterolemia [E78.00] 01/22/2022 Yes     Coronary artery disease [I25.10] 04/19/2021 Yes    Ischemic cardiomyopathy [I25.5] 02/04/2019 Yes    Automatic implantable cardioverter-defibrillator in situ [Z95.810] 02/04/2019 Yes      Problems Resolved During this Admission:    Diagnosis Date Noted Date Resolved POA    Acute cystitis with hematuria [N30.01] 02/13/2023 02/17/2023 Unknown    Preop examination [Z01.818] 02/03/2023 02/06/2023 Not Applicable    Hypotension [I95.9] 05/19/2022 02/06/2023 Yes     Odette Hart is a 67 y.o. year old female with history of HTN, CAD s/p PCI c/b HFrEF (EF 10%), MDD, debility, severe malnutrition, and open cholecystectomy and takedown of colovesical and colovaginal fistuli (two separate areas) with sigmoid colectomy, partial cecectomy and appendectomy, drainage of intra-abdominal abscess and end colostomy 5/9/2022 recently admitted 12/22/22 - 12/30/22 for new colocutaneous fistula admitted 1/32/23 with incarcerated colonic prolapse from her fistula s/p manual reduction now s/p open transverse/descending colectomy with takedown of colocutaneous fistula and end colostomy + parastomal hernia repair + lysis of adhesions >60 minutes (Juliane) + debridement of skin/subcutaneos fat/fascia/muscle (Daniel) 2/7/23, postoperative course complicated by initial ileus since resolved, midline wound hematoma s/p bedside evacuation with cotton-tipped applicator and subsequent iodoform packing, persistent malnutrition (prealbumin 7), and hematuria likely from JJ ureteral stent in setting of ASA/Plavix/LVX.     - CT abd/pelvis with abdominal wall fluid collection, which is likely source of drainage from midline wound  - BID iodoform dressing changes  - Started on Moxifloxacin due to multiple allergies to abx. Our team is working on finding out if this will be able to continue as an outpatient or will we have to order IV abx.   - Holding LVX in setting of hematuria  - Encourage OOB/IS  - Continue diet  - Home health orders are in place and  approved as of today 2/24/23. Will Dc home when appropriate per Dr. Juliane Hendrickson, NP  Colorectal Surgery  [facility]

## 2023-02-24 NOTE — PLAN OF CARE
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      Hypercholesterolemia [E78.00]  Yes    Coronary artery disease [I25.10]  Yes     2012: [place]: Anterior MI. LAD stent.  1/21/2022: NSTEMI. Troponin 9.  1/24/2022: [facility]: Cath: LAD: Proximal stent patent. LCX: Proximal 80%. LCX: Dominant. Moderate disease. LCX: HEVER 2.75 x 18 mm. Distal dissection. HEVER 2.75 x 22 mm.        Ischemic cardiomyopathy [I25.5]  Yes     1/24/2022: Echo: Severely enlarged left ventricle with severe systolic dysfunction. EF 20%.         Automatic implantable cardioverter-defibrillator in situ [Z95.810]  Yes     2014: "[employer] ICD.           Resolved Hospital Problems    Diagnosis Date Resolved POA    Acute cystitis with hematuria [N30.01] 02/17/2023 Unknown    Preop examination [Z01.818] 02/06/2023 Not Applicable    Hypotension [I95.9] 02/06/2023 Yes       Future Appointments   Date Time Provider Department Center   2/28/2023  2:00 PM Rafa Cardozo MD [facility] COLSURG [facility]   2/28/2023  3:15 PM DANIEL Ayala [facility] ENTERO [facility]   3/7/2023 11:15 AM Yohannes Cordova MD [id number]   3/10/2023  1:20 PM [facility] UROLOGY PHYSICIAN CLINIC [facility] UROLOG [facility]           I have seen and examined this patient face to face today. My clinical findings that support the need for the home health skilled  services and home bound status are the following:  Weakness/numbness causing balance and gait disturbance due to Surgery making it taxing to leave home.    Allergies:  Review of patient's allergies indicates:   Allergen Reactions    Augmentin [amoxicillin-pot clavulanate] Swelling     Not allergic to amoxicillin just a derivative of augmentin    Lisinopril Other (See Comments)     Fluid around heart    Shellfish containing products Anaphylaxis     Pt.states she is allergic to SEAFOOD since the age of 13    Levaquin [levofloxacin] Other (See Comments)     Very bad joint pain    Clindamycin Palpitations     Chest pain       Diet: regular diet    Activities: no heavy lifting for 6 weeks    Nursing:   SN to complete comprehensive assessment including routine vital signs. Instruct on disease process and s/s of complications to report to MD. Review/verify medication list sent home with the patient at time of discharge  and instruct patient/caregiver as needed. Frequency may be adjusted depending on start of care date.    Notify MD if SBP > 160 or < 90; DBP > 90 or < 50; HR > 120 or < 50; Temp > 101;       CONSULTS:    Physical Therapy to evaluate and treat. Evaluate for home safety and equipment needs; Establish/upgrade home exercise program. Perform / instruct on therapeutic exercises, gait training, transfer training, and Range of Motion.  Occupational Therapy to evaluate and treat. Evaluate home environment for safety and equipment needs. Perform/Instruct on transfers, ADL training, ROM, and therapeutic exercises.      MISCELLANEOUS CARE:  Colostomy Care:  Instruct patient/caregiver to empty bag when full and PRN., Change and clean site every 48 hours, and Monitor skin integrity.    WOUND CARE ORDERS  yes:  Surgical Wound:  Location: Midline abdomen    Consult ET nurse        Wound care BID, irrigate would with 10cc normal saline, use 10cc syringe, lighlty packing 3 openings with iodofoam and apply outer dsy    Home  Health 4 times a week    Continue colostomy care and education         Medications: Review discharge medications with patient and family and provide education.      Current Discharge Medication List        START taking these medications    Details   moxifloxacin (AVELOX) 400 mg tablet Take 1 tablet (400 mg total) by mouth once daily. for 14 days  Qty: 14 tablet, Refills: 0           CONTINUE these medications which have NOT CHANGED    Details   aspirin (ECOTRIN) 81 MG EC tablet Take 1 tablet (81 mg total) by mouth once daily.  Qty: 90 tablet, Refills: 3    Associated Diagnoses: Coronary artery disease involving native coronary artery of native heart without angina pectoris      clopidogreL (PLAVIX) 75 mg tablet Take 1 tablet (75 mg total) by mouth once daily.  Qty: 90 tablet, Refills: 3      ezetimibe (ZETIA) 10 mg tablet Take 1 tablet (10 mg total) by mouth once daily.  Qty: 90 tablet, Refills: 3    Associated Diagnoses: Hypercholesterolemia      acetaminophen (TYLENOL) 500 MG tablet Take 2 tablets (1,000 mg total) by mouth every 8 (eight) hours as needed for Pain.  Refills: 0      albuterol (PROVENTIL/VENTOLIN HFA) 90 mcg/actuation inhaler Inhale 2 puffs into the lungs every 6 (six) hours as needed for Wheezing. Rescue  Qty: 18 g, Refills: 1    Associated Diagnoses: Bronchitis      ALPRAZolam (XANAX) 0.5 MG tablet Take 1 tablet (0.5 mg total) by mouth nightly as needed for Anxiety.  Qty: 30 tablet, Refills: 1    Associated Diagnoses: Anxiety      docusate sodium (COLACE) 100 MG capsule Take 1 capsule (100 mg total) by mouth 2 (two) times daily.  Qty: 60 capsule, Refills: 1      empagliflozin (JARDIANCE) 10 mg tablet Take 1 tablet (10 mg total) by mouth once daily.  Qty: 90 tablet, Refills: 3    Associated Diagnoses: Heart failure, systolic and diastolic, chronic      famotidine (PEPCID) 20 MG tablet Take 1 tablet (20 mg total) by mouth once daily.  Qty: 30 tablet, Refills: 0      fluticasone propionate (FLONASE)  50 mcg/actuation nasal spray 1 spray (50 mcg total) by Each Nostril route once daily.  Qty: 9.9 mL, Refills: 1    Associated Diagnoses: Sinusitis, unspecified chronicity, unspecified location      food supplemt, lactose-reduced (ENSURE HIGH PROTEIN) Liqd Take 240 mLs by mouth 2 (two) times a day.  Qty: 60 each, Refills: 3    Associated Diagnoses: Weight loss; FTT (failure to thrive) in adult      furosemide (LASIX) 40 MG tablet Take 1 tablet (40 mg total) by mouth once daily.  Qty: 120 tablet, Refills: 3    Associated Diagnoses: Heart failure, systolic and diastolic, chronic      HYDROcodone-acetaminophen (NORCO) 5-325 mg per tablet Take 1 tablet by mouth every 8 (eight) hours as needed for Pain.  Qty: 20 tablet, Refills: 0    Comments: Quantity prescribed more than 7 day supply? Yes, quantity medically necessary  Associated Diagnoses: Abdominal pain, unspecified abdominal location      losartan (COZAAR) 25 MG tablet Take 1 tablet (25 mg total) by mouth once daily.  Qty: 90 tablet, Refills: 3    Associated Diagnoses: Heart failure, systolic and diastolic, chronic      metoprolol succinate (TOPROL-XL) 25 MG 24 hr tablet Take 1 tablet (25 mg total) by mouth once daily.  Qty: 90 tablet, Refills: 3    Associated Diagnoses: Heart failure, systolic and diastolic, chronic      nitroGLYCERIN (NITROSTAT) 0.4 MG SL tablet Place 1 tablet (0.4 mg total) under the tongue every 5 (five) minutes as needed for Chest pain.  Qty: 25 tablet, Refills: 3      ondansetron (ZOFRAN-ODT) 8 MG TbDL Take 1 tablet (8 mg total) by mouth every 12 (twelve) hours as needed (nausea).  Qty: 20 tablet, Refills: 2    Associated Diagnoses: Nausea      simethicone (MYLICON) 80 MG chewable tablet Take 1 tablet (80 mg total) by mouth every 8 (eight) hours as needed for Flatulence.  Qty: 120 tablet, Refills: 5    Associated Diagnoses: S/P colostomy; Abdominal gas pain      spironolactone (ALDACTONE) 25 MG tablet Take 0.5 tablets (12.5 mg total) by mouth  once daily.  Qty: 90 tablet, Refills: 3    Associated Diagnoses: Heart failure, systolic and diastolic, chronic             I certify that this patient is confined to her home and needs intermittent skilled nursing care, physical therapy, and occupational therapy.          Electronically Signed  _________________________________  Nadira Hendrickson NP  02/24/2023

## 2023-02-24 NOTE — PLAN OF CARE
02/24/23 0951   Post-Acute Status   Post-Acute Authorization Home Health   Home Health Status Referrals Sent   Coverage PHN   Discharge Delays None known at this time   Discharge Plan   Discharge Plan A Home Health       Home Health orders faxed to N.    Marbella López LCSW  Case Management/[facility]  [phone]     10:53 AM  Referrals sent to [facility] and [facility] to create a more cohesive care team for pt at home.     Marbella López LCSW  Case Management/[facility]  [phone]

## 2023-02-25 NOTE — ASSESSMENT & PLAN NOTE
Odette Hart is a 67 y.o. year old female with history of HTN, CAD s/p PCI c/b HFrEF (EF 10%), MDD, debility, severe malnutrition, and open cholecystectomy and takedown of colovesical and colovaginal fistuli (two separate areas) with sigmoid colectomy, partial cecectomy and appendectomy, drainage of intra-abdominal abscess and end colostomy 5/9/2022 recently admitted 12/22/22 - 12/30/22 for new colocutaneous fistula admitted 1/32/23 with incarcerated colonic prolapse from her fistula s/p manual reduction now s/p open transverse/descending colectomy with takedown of colocutaneous fistula and end colostomy + parastomal hernia repair + lysis of adhesions >60 minutes (Juliane) + debridement of skin/subcutaneos fat/fascia/muscle (aDniel) 2/7/23, postoperative course complicated by initial ileus since resolved, midline wound hematoma s/p bedside evacuation with cotton-tipped applicator and subsequent iodoform packing, persistent malnutrition (prealbumin 7), and hematuria likely from JJ ureteral stent in setting of ASA/Plavix/LVX.     - follow up UA   - CT abd/pelvis with abdominal wall fluid collection, which is likely source of drainage from midline wound  - BID iodoform dressing changes  - Started on Moxifloxacin due to multiple allergies to abx. Our team is working on finding out if this will be able to continue as an outpatient or will we have to order IV abx.   - Holding LVX in setting of hematuria  - Encourage OOB/IS  - Continue diet  - Home health orders are in place and approved as of today 2/24/23. Will Dc home when appropriate per Dr. Cardozo

## 2023-02-25 NOTE — PROGRESS NOTES
[facility]  General Surgery  Progress Note    Subjective:     History of Present Illness:  No notes on file    Post-Op Info:  Procedure(s) (LRB):  REVISION, COLOSTOMY (N/A)  INSERTION, CATHETER, URETER, BILATERAL (Left)  CYSTOSCOPY, WITH URETERAL STENT INSERTION (Right)  DEBRIDEMENT, ABDOMEN (N/A)  COLECTOMY, PARTIAL  LYSIS, ADHESIONS (N/A)   19 Days Post-Op     Interval History: No acute events. T max 101.2. Pain controlled. Tolerating diet. Having bowel function. Midline wound packing changed today. UA pending.     Medications:  Continuous Infusions:  Scheduled Meds:   aspirin  81 mg Oral Daily    balsam peru-castor oiL   Topical (Top) BID    clopidogreL  75 mg Oral Daily    ezetimibe  10 mg Oral Daily    famotidine  20 mg Oral BID    furosemide  20 mg Oral Daily    magnesium sulfate IVPB  2 g Intravenous Once    metoprolol succinate  25 mg Oral Daily    moxifloxacin  400 mg Oral Daily    nystatin  500,000 Units Oral QID    polyethylene glycol  17 g Oral Daily    potassium chloride  30 mEq Oral Once    potassium, sodium phosphates  1 packet Oral QID (AC & HS)     PRN Meds:[COMPLETED] acetaminophen **FOLLOWED BY** acetaminophen, albuterol, ALPRAZolam, dextrose 10%, dextrose 10%, fluticasone propionate, naloxone, nitroGLYCERIN, ondansetron, oxyCODONE, oxyCODONE, prochlorperazine, simethicone, sodium chloride 0.9%, sodium chloride 0.9%     Review of patient's allergies indicates:   Allergen Reactions    Augmentin [amoxicillin-pot clavulanate] Swelling     Not allergic to amoxicillin just a derivative of augmentin    Lisinopril Other (See Comments)     Fluid around heart    Shellfish containing products Anaphylaxis     Pt.states she is allergic to SEAFOOD since the age of 13    Levaquin [levofloxacin] Other (See Comments)     Very bad joint pain    Clindamycin Palpitations     Chest pain     Objective:     Vital Signs (Most Recent):  Temp: 98.6 °F (37 °C) (02/25/23 1133)  Pulse: 99 (02/25/23 1133)  Resp: 20  (02/25/23 1133)  BP: (!) 96/51 (02/25/23 1133)  SpO2: 97 % (02/25/23 1133)   Vital Signs (24h Range):  Temp:  [98.3 °F (36.8 °C)-101.2 °F (38.4 °C)] 98.6 °F (37 °C)  Pulse:  [] 99  Resp:  [14-22] 20  SpO2:  [92 %-100 %] 97 %  BP: ()/(50-59) 96/51     Weight: 54.9 kg (121 lb)  Body mass index is 21.43 kg/m².    Intake/Output - Last 3 Shifts         02/23 0700  02/24 0659 02/24 0700 02/25 0659 02/25 0700 02/26 0659    P.O.       Total Intake(mL/kg)       Urine (mL/kg/hr)       Stool 265      Total Output 265      Net -265             Stool Occurrence  0 x             Physical Exam  Constitutional:       General: She is not in acute distress.     Appearance: She is not ill-appearing.   Cardiovascular:      Rate and Rhythm: Normal rate.   Pulmonary:      Effort: Pulmonary effort is normal. No respiratory distress.   Abdominal:      General: There is no distension.      Palpations: Abdomen is soft.      Tenderness: There is no abdominal tenderness.      Comments: Midline incision with sutures, c/d/I  Small superior and inferior wound iodoform packing changed . No signs of infection        Significant Labs:  CBC:   Recent Labs   Lab 02/24/23  0613   WBC 2.64*   RBC 3.93*   HGB 9.3*   HCT 31.3*      MCV 80*   MCH 23.7*   MCHC 29.7*     CMP:   Recent Labs   Lab 02/25/23  0557   *   CALCIUM 8.5*   *   K 3.7   CO2 28   CL 92*   BUN 13   CREATININE 0.7       Significant Diagnostics:  I have reviewed all pertinent imaging results/findings within the past 24 hours.    Assessment/Plan:     * Colocutaneous fistula  Odette Hart is a 67 y.o. year old female with history of HTN, CAD s/p PCI c/b HFrEF (EF 10%), MDD, debility, severe malnutrition, and open cholecystectomy and takedown of colovesical and colovaginal fistuli (two separate areas) with sigmoid colectomy, partial cecectomy and appendectomy, drainage of intra-abdominal abscess and end colostomy 5/9/2022 recently admitted 12/22/22 -  12/30/22 for new colocutaneous fistula admitted 1/32/23 with incarcerated colonic prolapse from her fistula s/p manual reduction now s/p open transverse/descending colectomy with takedown of colocutaneous fistula and end colostomy + parastomal hernia repair + lysis of adhesions >60 minutes (Juliane) + debridement of skin/subcutaneos fat/fascia/muscle (Daniel) 2/7/23, postoperative course complicated by initial ileus since resolved, midline wound hematoma s/p bedside evacuation with cotton-tipped applicator and subsequent iodoform packing, persistent malnutrition (prealbumin 7), and hematuria likely from JJ ureteral stent in setting of ASA/Plavix/LVX.     - follow up UA   - CT abd/pelvis with abdominal wall fluid collection, which is likely source of drainage from midline wound  - BID iodoform dressing changes  - Started on Moxifloxacin due to multiple allergies to abx. Our team is working on finding out if this will be able to continue as an outpatient or will we have to order IV abx.   - Holding LVX in setting of hematuria  - Encourage OOB/IS  - Continue diet  - Home health orders are in place and approved as of today 2/24/23. Will Dc home when appropriate per Dr. Juliane Mack MD  General Surgery  [facility]

## 2023-02-25 NOTE — PLAN OF CARE
Problem: Adult Inpatient Plan of Care  Goal: Plan of Care Review  Outcome: Ongoing, Progressing     Problem: Adult Inpatient Plan of Care  Goal: Absence of Hospital-Acquired Illness or Injury  Outcome: Ongoing, Progressing     Problem: Adult Inpatient Plan of Care  Goal: Patient-Specific Goal (Individualized)  Outcome: Ongoing, Progressing     Problem: Fall Injury Risk  Goal: Absence of Fall and Fall-Related Injury  Outcome: Ongoing, Progressing     Problem: Infection  Goal: Absence of Infection Signs and Symptoms  Outcome: Ongoing, Progressing

## 2023-02-25 NOTE — ASSESSMENT & PLAN NOTE
S/p colocutaneous fistula revision with intra-op R ureteral stent placement for hydronephrosis. Developed post-op ileus.   Required post-op PCA pump now discontinued and on PRN opioids    Repeat CT AP demonstrated fluid collection and CRP increased as well. Discussion with CRS with plan for starting PO moxifloxacin and if worsening symptoms or fevers likely need escalation to IV abx and ID consultation given allergies and limited options.     -  PO moxifloxacin  - oxy Q4 PRN and continue bowel regimen  - Continue Wound care  - per primary

## 2023-02-25 NOTE — PT/OT/SLP PROGRESS
"Occupational Therapy   Treatment    Name: Odette Hart  MRN: [id number]  Admitting Diagnosis:  Colocutaneous fistula  19 Days Post-Op    Recommendations:     Discharge Recommendations: home health OT  Discharge Equipment Recommendations:  none  Barriers to discharge:       Assessment:     Odette Hart is a 67 y.o. female with a medical diagnosis of Colocutaneous fistula. Feeling "off" today so not wanting to walk as far as yesterday. Performance deficits affecting function are weakness, impaired endurance, impaired self care skills, impaired functional mobility, gait instability, impaired balance, decreased coordination.     Rehab Prognosis:  Good; patient would benefit from acute skilled OT services to address these deficits and reach maximum level of function.       Plan:     Patient to be seen 3 x/week to address the above listed problems via self-care/home management, therapeutic activities, therapeutic exercises  Plan of Care Expires: 03/07/23  Plan of Care Reviewed with: patient    Subjective     Pain/Comfort:  Pain Rating 1: 0/10    Objective:     Communicated with: rn prior to session.  Patient found supine with   upon OT entry to room.    General Precautions: Standard, fall    Orthopedic Precautions:N/A  Braces: N/A  Respiratory Status: Room air     Occupational Performance:     Bed Mobility:    Patient completed Scooting/Bridging with moderate assistance  Patient completed Supine to Sit with minimum assistance     Functional Mobility/Transfers:  Patient completed Sit <> Stand Transfer with contact guard assistance  with  rolling walker   Patient completed Bed <> Chair Transfer using Step Transfer technique with contact guard assistance with rolling walker  Functional Mobility: pt walked 60' CGA with a RW.    Activities of Daily Living:  Grooming: supervision seated.  Lower Body Dressing: maximal assistance     AMPAC 6 Click ADL: 19    Treatment & Education:  Discussed OT POC.  Encouraged activity " / OOB with staff over the weekend.    Patient left up in chair with all lines intact and call button in reach    GOALS:   Multidisciplinary Problems       Occupational Therapy Goals          Problem: Occupational Therapy    Goal Priority Disciplines Outcome Interventions   Occupational Therapy Goal     OT, PT/OT Ongoing, Progressing    Description: Goals to be met by: 2/28/2023    Patient will increase functional independence with ADLs by performing:    UE Dressing with Set-up Assistance.  LE Dressing with Setup.  Grooming while standing at sink with Modified Charles.  Toileting from toilet with Modified Charles for hygiene and clothing management.   Supine to sit with Modified Charles.  Stand pivot transfers with Modified Charles.  Toilet transfer to toilet with Modified Charles.                         Time Tracking:     OT Date of Treatment: 02/25/23  OT Start Time: 0903  OT Stop Time: 0935  OT Total Time (min): 32 min    Billable Minutes:Self Care/Home Management 10  Therapeutic Activity 22    OT/FROILAN: OT          2/25/2023

## 2023-02-25 NOTE — PROGRESS NOTES
[facility] Medicine  Progress Note    Patient Name: Odette Hart  MRN: [id number]  Patient Class: IP- Inpatient   Admission Date: 1/31/2023  Length of Stay: 25 days  Attending Physician: Rafa Cardozo MD  Primary Care Provider: Braxton Barragan MD        Subjective:     Principal Problem:Colocutaneous fistula        HPI:  68 yo F with HTN, HLD (statin intolerance), DM, CAD s/p PCI to LAD (2012)/LCX (1/2022), HFrEF (EF10%), debility, severe malnutrition, and the following procedures performed 5/2022: cholecystectomy, takedown of colovesical and colovaginal fistula, sigmoid colectomy, partial cecectomy, end colostomy who presents for colocutaneous fistula repair with abdominal wall reconstruction. On admission she was found to be hyponatremic and in acute on chronic decompensated heart failure. Cardiology was consulted to manage her HF and have since signed off as her volume status has become more euvolemic following diuresis. Medicine was consulted for pr-eop evaluation and continued management of her hyponatremia. Per chart review patient is chronically, mildly hyponatremic at baseline. She takes Lasix, Toprol, spironolactone, and losartan for her HFrEF. She reports correct use of her home medications. She states her diet consists of peanut butter and crackers. Denies CP/SOB/nausea vomiting, cough fever chills, polydipsia. Her Na has been improving with diuresis. Patient has multiple risk factors that complicate surgery: acute on chronic decompensated HF, CAD with PCIx2, hypertension, hyperlipidemia, malnutrition. This being said she does not have reported/documented/observed dysfunction of liver, kidney, lung, clotting.    On examination, she is chronically ill appearing/in no acute distress/at mental baseline. AF, /58 (78), sat 98% on RA, no leukocytosis, hgb 11.8, Hct 38.5, Plts 237, moderate Hyponatremia Na 124 (initially 122 on admission) with labile Baseline, K 3.1, Cl 85, Bun 13,  Cr 00.8, Ca 7.9, albumin 2-3. BNP 2600, UOP 2300 on lasix 80mg    CXR with mild pulmonary congestion      Overview/Hospital Course:  Admitted for planned colocutaneous fistula repair, found to be hyponatremic and in ADHF. Started on IV diuresis, >6 L UOP, returned to euvolemia. Urine/serum studies consistent with hypervolemic hyponatremia most likely in 2/2 to acute on chronic HF. She does have multiple factors that maker her high risk for surgery, primarily her severrely reduced LVEF 10%. Cardiology and medicine consulted for pre-operative evaluation. Agree she is high risk, but need of her procedure outweigh the risks. 2/6 Patient tolerated colocutaneous takedown/reconstruction well. Her Hyponatremia has resolved with Lasix 80mg BID. S/p colocutaneous fistula revision with intra-op R ureteral stent placement for hydronephrosis. Developed post-op ileus. Given lasix holiday for over diuresis. Plavix, ASA restarted post-op. Anticipate discharge on prior home PO lasix dosing.     Complaint of dysuria and dark cloudy urine noted by primary team, patient started on cipro then transitioned to cefpodoxime (broader coverage) given culture results of multiple organisms none in predominance (thus an unequivocal UCx).  Recently discontinued from PCA pump with PRN pain management of oxy 5 and 10 mg. Restarted lasix 20 mg on 2/14/23. CRS monitoring surgical site closely, some output of serous fluid, no signs of cellulitis. Continued symptomatic hypotension despite discontinuing GDMT. CTS and HTS both evaluated patient and deemed there are likely no advanced options given debility and age. Recommended palliative care consultation which was relayed to primary team.    Patient with new onset hematuria and reported dysuira. Call to lab concerning UA completed which demonstrated WBC clumps and no WBC was addend to show just hematuria. Urology was contacted given recent stent placement and plan to evaluate patient. Repeat CT ordered  by CRS which demonstrated likely fluid collection for which CRS plans to manage with continued PO Moxifloxacin. Will monitor for tolerance      Interval History: One fever 101.2, resolved afterwards. NAEON.     Review of Systems   Constitutional:  Negative for chills and fever.   HENT:  Negative for sinus pain.    Eyes:  Negative for pain and visual disturbance.   Respiratory:  Negative for cough and shortness of breath.    Cardiovascular:  Negative for chest pain, palpitations and leg swelling.   Gastrointestinal:  Negative for blood in stool, diarrhea and vomiting.   Genitourinary:  Positive for dysuria and hematuria. Negative for difficulty urinating.   Neurological:  Positive for dizziness, weakness and light-headedness.   Objective:     Vital Signs (Most Recent):  Temp: 98.6 °F (37 °C) (02/25/23 1133)  Pulse: 99 (02/25/23 1133)  Resp: 18 (02/25/23 1223)  BP: (!) 96/51 (02/25/23 1133)  SpO2: 97 % (02/25/23 1133)   Vital Signs (24h Range):  Temp:  [98.3 °F (36.8 °C)-101.2 °F (38.4 °C)] 98.6 °F (37 °C)  Pulse:  [] 99  Resp:  [17-22] 18  SpO2:  [92 %-100 %] 97 %  BP: ()/(50-59) 96/51     Weight: 54.9 kg (121 lb)  Body mass index is 21.43 kg/m².  No intake or output data in the 24 hours ending 02/25/23 1423   Physical Exam  Vitals and nursing note reviewed.   Constitutional:       General: She is not in acute distress.     Appearance: She is ill-appearing.   HENT:      Head: Normocephalic and atraumatic.      Right Ear: External ear normal.      Left Ear: External ear normal.      Nose: No congestion or rhinorrhea.   Eyes:      General:         Right eye: No discharge.         Left eye: No discharge.      Extraocular Movements: Extraocular movements intact.      Pupils: Pupils are equal, round, and reactive to light.   Cardiovascular:      Rate and Rhythm: Normal rate and regular rhythm.      Heart sounds: Normal heart sounds. No murmur heard.  Pulmonary:      Effort: No respiratory distress.      Breath  sounds: No wheezing or rales (bibasilar).   Abdominal:      General: There is no distension.      Comments: Colostomy, vertical/midline abdominal excision, bandage clean/dry   Musculoskeletal:         General: No tenderness.      Cervical back: Normal range of motion. No rigidity.      Right lower leg: No edema.      Left lower leg: No edema.   Skin:     Findings: Bruising present. No erythema.   Neurological:      General: No focal deficit present.      Mental Status: She is alert and oriented to person, place, and time.   Psychiatric:         Mood and Affect: Mood normal.         Behavior: Behavior normal.       Significant Labs: All pertinent labs within the past 24 hours have been reviewed.    Significant Imaging: I have reviewed all pertinent imaging results/findings within the past 24 hours.      Assessment/Plan:      * Colocutaneous fistula  S/p colocutaneous fistula revision with intra-op R ureteral stent placement for hydronephrosis. Developed post-op ileus.   Required post-op PCA pump now discontinued and on PRN opioids    Repeat CT AP demonstrated fluid collection and CRP increased as well. Discussion with CRS with plan for starting PO moxifloxacin and if worsening symptoms or fevers likely need escalation to IV abx and ID consultation given allergies and limited options.     -  PO moxifloxacin  - oxy Q4 PRN and continue bowel regimen  - Continue Wound care  - per primary     Hyponatremia  Chronic mild hyponatremia on chart review, now moderate on presentation  Found to be in ADHF on arrival  BNP 2600, volume overloaded, JVD present, bilateral LE edema on consultation  CXR with mild pulmonary congestion  Reports poor diet at home (crackers/peanutbutter)  Reduced albumin likely 2/2 to malnutrition    Previously due to ADHF. Stable at 130-132 but concern for secondary cause of hypovolemic hyponatremia as patient has had poor PO intake 2/2 abdominal pain    - strict I/Os  - daily CMP  - replace  electrolytes        Gross hematuria  Seen by urology and stated that hematuria 2/2 stent placement. No acute intervention.     Continue to monitor and follow up UPCR for proteinuria on UA as well      Hypertension  Now Hypotension.  Intermittent difficulties with hypotension while inpatient. Unable to tolerate full HFrEF GDMT.   Continued hypotension while admitted and symptomatic with ambulation. Will continue to titrate.     - Hold GDMT if SBP<95  - encourage PO intake     Coronary artery disease  History of Cad with PCIx2 LAD in 2012 and LCX 1/2022  Home regimen: DAPT    No further surgical intervention planned inpatient by CRS or urology.   Reported episode of chest pain 2/19 that was reproducible with palpation on examination and no reported episodes while patient ambulating and working with PT though LH and dizziness did occur. EKG and trop reassuring.    -  aspirin  - clopidogrel  -  BB as tolerated  - no statin 2/2 intolerance    Ischemic cardiomyopathy  Cardiology consulted for management of ADHF, now signed off  Recent echo showed EF of 10%  Patient hypervolemic on presentation, appears more euvolemic today  Home GDMT: Toprol-XL 25, losartan 25 mg.  Initial CXR without new detrimental changes since prior, BNP 2600 on arrival  1x IV lasix on 2/14. Resolved now on PO lasix 20 mg.    2/23-discussion with patient and with primary about GOC and recommendation for palliative consultation. Patient reports whats most important is extending life even if that means repeated admissions. Patient reports being told previously by cardiology about hospice/palliative and was not receptive at that time. Stated she would consider changing viewpoint if she cannot leave the hospital         Recommendations:   - HTS and CTS evaluated patient with recommendations against any advanced options given age and debility. Will continue to attempt GDMT titration as tolerated currently only on BB therapy  - continue lasix 20 mg daily PO  (half of home dose 40)  - replete Mag>2, K>4  - Toprol-XL 25 mg increased with hold parameters  - DC losartan 12.5 mg qd given hypotension   - avoid IV anti-hypertensive  - holding home spironolactone, unable to tolerate full HFrEF GDMT    Ileus  Patient has Post-operative ileus which is adynamic in etiology which is improving. This is inherent to her procedure/medications. Will treat conservatively with bowel rest, IV fluids, serial abdominal exams and avoidance of GI paralytics such as narcotics or anti-spasmodics. Monitor patient closely.     Diet advanced 2/12; resolved    Severe protein-calorie malnutrition  Nutrition consulted. Most recent weight and BMI monitored-                         Measurements:  Wt Readings from Last 1 Encounters:   02/14/23 54.9 kg (121 lb)   Body mass index is 21.43 kg/m².    Recommendations: Recommendation/Intervention: 1) Continue clear liquid   2) Continue Boost Plus & Boost Breeze  3) RD following  Goals: meets % een/epn by next rd f/u    Patient has been screened and assessed by RD. RD will follow patient.      Hypercholesterolemia  Lipids well controlled  Has documented statin intolerance    - continue ezetimibe    Automatic implantable cardioverter-defibrillator in situ          VTE Risk Mitigation (From admission, onward)         Ordered     Place sequential compression device  Until discontinued         02/06/23 1447     IP VTE HIGH RISK PATIENT  Once         02/06/23 1447                Discharge Planning   SIVAN: 2/27/2023     Code Status: Full Code   Is the patient medically ready for discharge?:     Reason for patient still in hospital (select all that apply): Patient trending condition  Discharge Plan A: Home Health   Discharge Delays: None known at this time              Kirstin Lima DO  Department of Hospital Medicine   [facility]

## 2023-02-25 NOTE — CARE UPDATE
"RAPID RESPONSE NURSE CHART REVIEW       Chart Reviewed: 02/25/2023, 12:18 PM    MRN: [id number]  Bed: 1060/1060 A    Dx: Colocutaneous fistula    Odette Hart has a past medical history of Acute coronary syndrome, Acute exacerbation of chronic obstructive pulmonary disease (COPD), Allergy, Arthritis, Cardiomyopathy, CHF (congestive heart failure), Coronary artery disease, Coronary artery disease of native artery of native heart with stable angina pectoris, Diverticulitis, Diverticulosis, Familial hypercholesterolemia, Former smoker, Heart attack, Heart disease, History of myocardial infarction, Hyperlipidemia, Hypertension, Hypertension, ICD (implantable cardioverter-defibrillator) in place, and Non-ST elevation myocardial infarction (NSTEMI).    Last VS: BP (!) 96/51 (Patient Position: Lying)   Pulse 99   Temp 98.6 °F (37 °C) (Oral)   Resp 20   Ht 5' 3" (1.6 m)   Wt 54.9 kg (121 lb)   LMP  (LMP Unknown)   SpO2 97%   Breastfeeding No   BMI 21.43 kg/m²     24H Vital Sign Range:  Temp:  [98.3 °F (36.8 °C)-101.2 °F (38.4 °C)]   Pulse:  []   Resp:  [14-22]   BP: ()/(50-59)   SpO2:  [92 %-100 %]     Level of Consciousness (AVPU): alert    Recent Labs     02/23/23  0636 02/24/23  0613   WBC 2.59* 2.64*   HGB 9.6* 9.3*   HCT 31.1* 31.3*    382       Recent Labs     02/23/23  0636 02/24/23  0321 02/25/23  0557   * 130* 127*   K 4.1 4.1 3.7   CL 92* 93* 92*   CO2 31* 30* 28   CREATININE 0.7 0.7 0.7    95 141*   PHOS 3.3 3.2 2.9   MG 2.0 1.9 1.6        No results for input(s): PH, PCO2, PO2, HCO3, POCSATURATED, BE in the last 72 hours.     OXYGEN:  Flow (L/min): 2  Oxygen Concentration (%): 28       MEWS score: 2    Charge RN, Jonas  contacted. No concerns verbalized at this time. Instructed to call [phone] for further concerns or assistance.    Cely Fan RN        "

## 2023-02-25 NOTE — SUBJECTIVE & OBJECTIVE
Interval History: One fever 101.2, resolved afterwards. NAEON.     Review of Systems   Constitutional:  Negative for chills and fever.   HENT:  Negative for sinus pain.    Eyes:  Negative for pain and visual disturbance.   Respiratory:  Negative for cough and shortness of breath.    Cardiovascular:  Negative for chest pain, palpitations and leg swelling.   Gastrointestinal:  Negative for blood in stool, diarrhea and vomiting.   Genitourinary:  Positive for dysuria and hematuria. Negative for difficulty urinating.   Neurological:  Positive for dizziness, weakness and light-headedness.   Objective:     Vital Signs (Most Recent):  Temp: 98.6 °F (37 °C) (02/25/23 1133)  Pulse: 99 (02/25/23 1133)  Resp: 18 (02/25/23 1223)  BP: (!) 96/51 (02/25/23 1133)  SpO2: 97 % (02/25/23 1133)   Vital Signs (24h Range):  Temp:  [98.3 °F (36.8 °C)-101.2 °F (38.4 °C)] 98.6 °F (37 °C)  Pulse:  [] 99  Resp:  [17-22] 18  SpO2:  [92 %-100 %] 97 %  BP: ()/(50-59) 96/51     Weight: 54.9 kg (121 lb)  Body mass index is 21.43 kg/m².  No intake or output data in the 24 hours ending 02/25/23 1423   Physical Exam  Vitals and nursing note reviewed.   Constitutional:       General: She is not in acute distress.     Appearance: She is ill-appearing.   HENT:      Head: Normocephalic and atraumatic.      Right Ear: External ear normal.      Left Ear: External ear normal.      Nose: No congestion or rhinorrhea.   Eyes:      General:         Right eye: No discharge.         Left eye: No discharge.      Extraocular Movements: Extraocular movements intact.      Pupils: Pupils are equal, round, and reactive to light.   Cardiovascular:      Rate and Rhythm: Normal rate and regular rhythm.      Heart sounds: Normal heart sounds. No murmur heard.  Pulmonary:      Effort: No respiratory distress.      Breath sounds: No wheezing or rales (bibasilar).   Abdominal:      General: There is no distension.      Comments: Colostomy, vertical/midline  abdominal excision, bandage clean/dry   Musculoskeletal:         General: No tenderness.      Cervical back: Normal range of motion. No rigidity.      Right lower leg: No edema.      Left lower leg: No edema.   Skin:     Findings: Bruising present. No erythema.   Neurological:      General: No focal deficit present.      Mental Status: She is alert and oriented to person, place, and time.   Psychiatric:         Mood and Affect: Mood normal.         Behavior: Behavior normal.       Significant Labs: All pertinent labs within the past 24 hours have been reviewed.    Significant Imaging: I have reviewed all pertinent imaging results/findings within the past 24 hours.

## 2023-02-25 NOTE — SUBJECTIVE & OBJECTIVE
Interval History: No acute events. Pain controlled. Tolerating diet. Having bowel function. Midline wound packing changed today.    Medications:  Continuous Infusions:  Scheduled Meds:   aspirin  81 mg Oral Daily    balsam peru-castor oiL   Topical (Top) BID    clopidogreL  75 mg Oral Daily    ezetimibe  10 mg Oral Daily    famotidine  20 mg Oral BID    furosemide  20 mg Oral Daily    magnesium sulfate IVPB  2 g Intravenous Once    metoprolol succinate  25 mg Oral Daily    moxifloxacin  400 mg Oral Daily    nystatin  500,000 Units Oral QID    polyethylene glycol  17 g Oral Daily    potassium chloride  30 mEq Oral Once    potassium, sodium phosphates  1 packet Oral QID (AC & HS)     PRN Meds:[COMPLETED] acetaminophen **FOLLOWED BY** acetaminophen, albuterol, ALPRAZolam, dextrose 10%, dextrose 10%, fluticasone propionate, naloxone, nitroGLYCERIN, ondansetron, oxyCODONE, oxyCODONE, prochlorperazine, simethicone, sodium chloride 0.9%, sodium chloride 0.9%     Review of patient's allergies indicates:   Allergen Reactions    Augmentin [amoxicillin-pot clavulanate] Swelling     Not allergic to amoxicillin just a derivative of augmentin    Lisinopril Other (See Comments)     Fluid around heart    Shellfish containing products Anaphylaxis     Pt.states she is allergic to SEAFOOD since the age of 13    Levaquin [levofloxacin] Other (See Comments)     Very bad joint pain    Clindamycin Palpitations     Chest pain     Objective:     Vital Signs (Most Recent):  Temp: 98.6 °F (37 °C) (02/25/23 1133)  Pulse: 99 (02/25/23 1133)  Resp: 20 (02/25/23 1133)  BP: (!) 96/51 (02/25/23 1133)  SpO2: 97 % (02/25/23 1133)   Vital Signs (24h Range):  Temp:  [98.3 °F (36.8 °C)-101.2 °F (38.4 °C)] 98.6 °F (37 °C)  Pulse:  [] 99  Resp:  [14-22] 20  SpO2:  [92 %-100 %] 97 %  BP: ()/(50-59) 96/51     Weight: 54.9 kg (121 lb)  Body mass index is 21.43 kg/m².    Intake/Output - Last 3 Shifts         02/23 0700  02/24 0659 02/24  0700  02/25 0659 02/25 0700  02/26 0659    P.O.       Total Intake(mL/kg)       Urine (mL/kg/hr)       Stool 265      Total Output 265      Net -265             Stool Occurrence  0 x             Physical Exam  Constitutional:       General: She is not in acute distress.     Appearance: She is not ill-appearing.   Cardiovascular:      Rate and Rhythm: Normal rate.   Pulmonary:      Effort: Pulmonary effort is normal. No respiratory distress.   Abdominal:      General: There is no distension.      Palpations: Abdomen is soft.      Tenderness: There is no abdominal tenderness.      Comments: Midline incision with sutures, c/d/I  Small superior and inferior wound iodoform packing changed . No signs of infection        Significant Labs:  CBC:   Recent Labs   Lab 02/24/23  0613   WBC 2.64*   RBC 3.93*   HGB 9.3*   HCT 31.3*      MCV 80*   MCH 23.7*   MCHC 29.7*     CMP:   Recent Labs   Lab 02/25/23  0557   *   CALCIUM 8.5*   *   K 3.7   CO2 28   CL 92*   BUN 13   CREATININE 0.7       Significant Diagnostics:  I have reviewed all pertinent imaging results/findings within the past 24 hours.

## 2023-02-26 NOTE — NURSING
Wound care to abdominal done,area cleansed with normal saline pat dry applied composite island dressing,Ostomy bag changed,moderate amount f green stool noted,tolerated well.

## 2023-02-26 NOTE — SUBJECTIVE & OBJECTIVE
Interval History: NAEON. Afebrile on PO moxifloxacin. Still reporting episodes of leaking from colostomy bag. Na downtrending to 125 but asymptomatic. Will receive IV lasix and consider increasing PO back to 40 mg.    Review of Systems   Constitutional:  Negative for chills and fever.   HENT:  Negative for sinus pain.    Eyes:  Negative for pain and visual disturbance.   Respiratory:  Negative for cough and shortness of breath.    Cardiovascular:  Negative for chest pain, palpitations and leg swelling.   Gastrointestinal:  Negative for blood in stool, diarrhea and vomiting.   Genitourinary:  Positive for hematuria. Negative for difficulty urinating and dysuria.   Neurological:  Positive for dizziness, weakness and light-headedness.   Objective:     Vital Signs (Most Recent):  Temp: 97.2 °F (36.2 °C) (02/26/23 0416)  Pulse: 92 (02/26/23 0416)  Resp: 16 (02/26/23 0416)  BP: (!) 137/53 (02/26/23 0416)  SpO2: 99 % (02/26/23 0416)   Vital Signs (24h Range):  Temp:  [97.2 °F (36.2 °C)-99.1 °F (37.3 °C)] 97.2 °F (36.2 °C)  Pulse:  [] 92  Resp:  [16-20] 16  SpO2:  [97 %-100 %] 99 %  BP: ()/(47-57) 137/53     Weight: 54.9 kg (121 lb)  Body mass index is 21.43 kg/m².    Intake/Output Summary (Last 24 hours) at 2/26/2023 0812  Last data filed at 2/25/2023 1531  Gross per 24 hour   Intake --   Output 675 ml   Net -675 ml      Physical Exam  Vitals and nursing note reviewed.   Constitutional:       General: She is not in acute distress.     Appearance: She is ill-appearing.   HENT:      Head: Normocephalic and atraumatic.      Right Ear: External ear normal.      Left Ear: External ear normal.      Nose: No congestion or rhinorrhea.   Eyes:      General:         Right eye: No discharge.         Left eye: No discharge.      Extraocular Movements: Extraocular movements intact.      Pupils: Pupils are equal, round, and reactive to light.   Cardiovascular:      Rate and Rhythm: Normal rate and regular rhythm.      Heart  sounds: Normal heart sounds. No murmur heard.  Pulmonary:      Effort: No respiratory distress.      Breath sounds: Rales (bibasilar) present. No wheezing.   Abdominal:      General: There is no distension.      Comments: Colostomy, vertical/midline abdominal excision, bandage clean/dry   Musculoskeletal:         General: No tenderness.      Cervical back: Normal range of motion. No rigidity.      Right lower leg: No edema.      Left lower leg: No edema.   Skin:     Findings: Bruising present. No erythema.   Neurological:      General: No focal deficit present.      Mental Status: She is alert and oriented to person, place, and time.   Psychiatric:         Mood and Affect: Mood normal.         Behavior: Behavior normal.       Significant Labs: All pertinent labs within the past 24 hours have been reviewed.  CBC: No results for input(s): WBC, HGB, HCT, PLT in the last 48 hours.  CMP:   Recent Labs   Lab 02/25/23  0557 02/26/23  0628   * 125*   K 3.7 4.7   CL 92* 92*   CO2 28 24   * 134*   BUN 13 17   CREATININE 0.7 0.7   CALCIUM 8.5* 8.6*   ANIONGAP 7* 9     Magnesium:   Recent Labs   Lab 02/25/23  0557 02/26/23  0628   MG 1.6 2.0       Significant Imaging: I have reviewed all pertinent imaging results/findings within the past 24 hours.

## 2023-02-26 NOTE — PROGRESS NOTES
[facility] Medicine  Progress Note    Patient Name: Odette Hart  MRN: [id number]  Patient Class: IP- Inpatient   Admission Date: 1/31/2023  Length of Stay: 26 days  Attending Physician: Rafa Cardozo MD  Primary Care Provider: Braxton Barragan MD        Subjective:     Principal Problem:Colocutaneous fistula        HPI:  66 yo F with HTN, HLD (statin intolerance), DM, CAD s/p PCI to LAD (2012)/LCX (1/2022), HFrEF (EF10%), debility, severe malnutrition, and the following procedures performed 5/2022: cholecystectomy, takedown of colovesical and colovaginal fistula, sigmoid colectomy, partial cecectomy, end colostomy who presents for colocutaneous fistula repair with abdominal wall reconstruction. On admission she was found to be hyponatremic and in acute on chronic decompensated heart failure. Cardiology was consulted to manage her HF and have since signed off as her volume status has become more euvolemic following diuresis. Medicine was consulted for pr-eop evaluation and continued management of her hyponatremia. Per chart review patient is chronically, mildly hyponatremic at baseline. She takes Lasix, Toprol, spironolactone, and losartan for her HFrEF. She reports correct use of her home medications. She states her diet consists of peanut butter and crackers. Denies CP/SOB/nausea vomiting, cough fever chills, polydipsia. Her Na has been improving with diuresis. Patient has multiple risk factors that complicate surgery: acute on chronic decompensated HF, CAD with PCIx2, hypertension, hyperlipidemia, malnutrition. This being said she does not have reported/documented/observed dysfunction of liver, kidney, lung, clotting.    On examination, she is chronically ill appearing/in no acute distress/at mental baseline. AF, /58 (78), sat 98% on RA, no leukocytosis, hgb 11.8, Hct 38.5, Plts 237, moderate Hyponatremia Na 124 (initially 122 on admission) with labile Baseline, K 3.1, Cl 85, Bun 13,  Cr 00.8, Ca 7.9, albumin 2-3. BNP 2600, UOP 2300 on lasix 80mg    CXR with mild pulmonary congestion      Overview/Hospital Course:  Admitted for planned colocutaneous fistula repair, found to be hyponatremic and in ADHF. Started on IV diuresis, >6 L UOP, returned to euvolemia. Urine/serum studies consistent with hypervolemic hyponatremia most likely in 2/2 to acute on chronic HF. She does have multiple factors that maker her high risk for surgery, primarily her severrely reduced LVEF 10%. Cardiology and medicine consulted for pre-operative evaluation. Agree she is high risk, but need of her procedure outweigh the risks. 2/6 Patient tolerated colocutaneous takedown/reconstruction well. Her Hyponatremia has resolved with Lasix 80mg BID. S/p colocutaneous fistula revision with intra-op R ureteral stent placement for hydronephrosis. Developed post-op ileus. Given lasix holiday for over diuresis. Plavix, ASA restarted post-op. Anticipate discharge on prior home PO lasix dosing.     Complaint of dysuria and dark cloudy urine noted by primary team, patient started on cipro then transitioned to cefpodoxime (broader coverage) given culture results of multiple organisms none in predominance (thus an unequivocal UCx).  Recently discontinued from PCA pump with PRN pain management of oxy 5 and 10 mg. Restarted lasix 20 mg on 2/14/23. CRS monitoring surgical site closely, some output of serous fluid, no signs of cellulitis. Continued symptomatic hypotension despite discontinuing GDMT. CTS and HTS both evaluated patient and deemed there are likely no advanced options given debility and age. Recommended palliative care consultation which was relayed to primary team.    Patient with new onset hematuria and reported dysuira. Call to lab concerning UA completed which demonstrated WBC clumps and no WBC was addend to show just hematuria. Urology was contacted given recent stent placement and plan to evaluate patient. Repeat CT ordered  by CRS which demonstrated likely fluid collection for which CRS plans to manage with continued PO Moxifloxacin. Will monitor for tolerance      Interval History: NAEON. Afebrile on PO moxifloxacin. Still reporting episodes of leaking from colostomy bag. Na downtrending to 125 but asymptomatic. Will receive IV lasix and consider increasing PO back to 40 mg.    Review of Systems   Constitutional:  Negative for chills and fever.   HENT:  Negative for sinus pain.    Eyes:  Negative for pain and visual disturbance.   Respiratory:  Negative for cough and shortness of breath.    Cardiovascular:  Negative for chest pain, palpitations and leg swelling.   Gastrointestinal:  Negative for blood in stool, diarrhea and vomiting.   Genitourinary:  Positive for hematuria. Negative for difficulty urinating and dysuria.   Neurological:  Positive for dizziness, weakness and light-headedness.   Objective:     Vital Signs (Most Recent):  Temp: 97.2 °F (36.2 °C) (02/26/23 0416)  Pulse: 92 (02/26/23 0416)  Resp: 16 (02/26/23 0416)  BP: (!) 137/53 (02/26/23 0416)  SpO2: 99 % (02/26/23 0416)   Vital Signs (24h Range):  Temp:  [97.2 °F (36.2 °C)-99.1 °F (37.3 °C)] 97.2 °F (36.2 °C)  Pulse:  [] 92  Resp:  [16-20] 16  SpO2:  [97 %-100 %] 99 %  BP: ()/(47-57) 137/53     Weight: 54.9 kg (121 lb)  Body mass index is 21.43 kg/m².    Intake/Output Summary (Last 24 hours) at 2/26/2023 0812  Last data filed at 2/25/2023 1531  Gross per 24 hour   Intake --   Output 675 ml   Net -675 ml      Physical Exam  Vitals and nursing note reviewed.   Constitutional:       General: She is not in acute distress.     Appearance: She is ill-appearing.   HENT:      Head: Normocephalic and atraumatic.      Right Ear: External ear normal.      Left Ear: External ear normal.      Nose: No congestion or rhinorrhea.   Eyes:      General:         Right eye: No discharge.         Left eye: No discharge.      Extraocular Movements: Extraocular movements intact.       Pupils: Pupils are equal, round, and reactive to light.   Cardiovascular:      Rate and Rhythm: Normal rate and regular rhythm.      Heart sounds: Normal heart sounds. No murmur heard.  Pulmonary:      Effort: No respiratory distress.      Breath sounds: Rales (bibasilar) present. No wheezing.   Abdominal:      General: There is no distension.      Comments: Colostomy, vertical/midline abdominal excision, bandage clean/dry   Musculoskeletal:         General: No tenderness.      Cervical back: Normal range of motion. No rigidity.      Right lower leg: No edema.      Left lower leg: No edema.   Skin:     Findings: Bruising present. No erythema.   Neurological:      General: No focal deficit present.      Mental Status: She is alert and oriented to person, place, and time.   Psychiatric:         Mood and Affect: Mood normal.         Behavior: Behavior normal.       Significant Labs: All pertinent labs within the past 24 hours have been reviewed.  CBC: No results for input(s): WBC, HGB, HCT, PLT in the last 48 hours.  CMP:   Recent Labs   Lab 02/25/23  0557 02/26/23  0628   * 125*   K 3.7 4.7   CL 92* 92*   CO2 28 24   * 134*   BUN 13 17   CREATININE 0.7 0.7   CALCIUM 8.5* 8.6*   ANIONGAP 7* 9     Magnesium:   Recent Labs   Lab 02/25/23  0557 02/26/23  0628   MG 1.6 2.0       Significant Imaging: I have reviewed all pertinent imaging results/findings within the past 24 hours.      Assessment/Plan:      * Colocutaneous fistula  S/p colocutaneous fistula revision with intra-op R ureteral stent placement for hydronephrosis. Developed post-op ileus.   Required post-op PCA pump now discontinued and on PRN opioids    Repeat CT AP demonstrated fluid collection and CRP increased as well. Discussion with CRS with plan for starting PO moxifloxacin and if worsening symptoms or fevers likely need escalation to IV abx and ID consultation given allergies and limited options.     -  PO moxifloxacin  - oxy Q4 PRN and  continue bowel regimen  - Continue Wound care  - per primary     Gross hematuria  Seen by urology and stated that hematuria 2/2 stent placement. No acute intervention.     Continue to monitor and follow up UPCR for proteinuria on UA as well      Ileus  Patient has Post-operative ileus which is adynamic in etiology which is improving. This is inherent to her procedure/medications. Will treat conservatively with bowel rest, IV fluids, serial abdominal exams and avoidance of GI paralytics such as narcotics or anti-spasmodics. Monitor patient closely.     Diet advanced 2/12; resolved    Severe protein-calorie malnutrition  Nutrition consulted. Most recent weight and BMI monitored-                         Measurements:  Wt Readings from Last 1 Encounters:   02/14/23 54.9 kg (121 lb)   Body mass index is 21.43 kg/m².    Recommendations: Recommendation/Intervention: 1) Continue clear liquid   2) Continue Boost Plus & Boost Breeze  3) RD following  Goals: meets % een/epn by next rd f/u    Patient has been screened and assessed by RD. RD will follow patient.      Hyponatremia  Chronic mild hyponatremia on chart review, now moderate on presentation  Found to be in ADHF on arrival  BNP 2600, volume overloaded, JVD present, bilateral LE edema on consultation  CXR with mild pulmonary congestion  Reports poor diet at home (crackers/peanutbutter)  Reduced albumin likely 2/2 to malnutrition    Previously due to ADHF. Worsening back to 125, asymptomatic. Likely due to ADHF again as patient with rhales on examination.     - Urine osm and Na to be collected  - IV lasix 40 mg 1x will consider repeat dosing in AM  - will trend weight and follow up repeat BNP  - strict I/Os  - daily CMP  - replace electrolytes        Hypertension  Now Hypotension.  Intermittent difficulties with hypotension while inpatient. Unable to tolerate full HFrEF GDMT.   Continued hypotension while admitted and symptomatic with ambulation. Will continue to  titrate.     - Hold GDMT if SBP<95  - encourage PO intake     Hypercholesterolemia  Lipids well controlled  Has documented statin intolerance    - continue ezetimibe    Coronary artery disease  History of Cad with PCIx2 LAD in 2012 and LCX 1/2022  Home regimen: DAPT    No further surgical intervention planned inpatient by CRS or urology.   Reported episode of chest pain 2/19 that was reproducible with palpation on examination and no reported episodes while patient ambulating and working with PT though LH and dizziness did occur. EKG and trop reassuring.    -  aspirin  - clopidogrel  -  BB as tolerated  - no statin 2/2 intolerance    Automatic implantable cardioverter-defibrillator in situ        Ischemic cardiomyopathy  Cardiology consulted for management of ADHF, now signed off  Recent echo showed EF of 10%  Patient hypervolemic on presentation, appears more euvolemic today  Home GDMT: Toprol-XL 25, losartan 25 mg.  Initial CXR without new detrimental changes since prior, BNP 2600 on arrival  1x IV lasix on 2/14. Resolved now on PO lasix 20 mg.    2/23-discussion with patient and with primary about GOC and recommendation for palliative consultation. Patient reports whats most important is extending life even if that means repeated admissions. Patient reports being told previously by cardiology about hospice/palliative and was not receptive at that time. Stated she would consider changing viewpoint if she cannot leave the hospital         Recommendations:   - IV lasix 40 mg and consider increasing PO back to 40 pending response  - HTS and CTS evaluated patient with recommendations against any advanced options given age and debility. Will continue to attempt GDMT titration as tolerated currently only on BB therapy  - replete Mag>2, K>4  - Toprol-XL 25 mg increased with hold parameters  - DC losartan 12.5 mg qd given hypotension   - avoid IV anti-hypertensive  - holding home spironolactone, unable to tolerate full  HFrEF GDMT      VTE Risk Mitigation (From admission, onward)         Ordered     Place sequential compression device  Until discontinued         02/06/23 1447     IP VTE HIGH RISK PATIENT  Once         02/06/23 1447                Discharge Planning   SIVAN: 2/27/2023     Code Status: Full Code   Is the patient medically ready for discharge?:     Reason for patient still in hospital (select all that apply): Patient new problem, Patient trending condition and Pending disposition  Discharge Plan A: Home Health   Discharge Delays: None known at this time              Ml Lagos DO  Department of Hospital Medicine   [facility] [facility]

## 2023-02-26 NOTE — PROGRESS NOTES
[facility]  Colorectal Surgery  Progress Note    Patient Name: Odette Hart  MRN: [id number]  Admission Date: 1/31/2023  Hospital Length of Stay: 26 days  Attending Physician: Rafa Cardozo MD    Subjective:     Interval History:   No acute events overnight. This AM pt feels well overall. Tolerating diet without nausea/emesis.     Post-Op Info:  Procedure(s) (LRB):  REVISION, COLOSTOMY (N/A)  INSERTION, CATHETER, URETER, BILATERAL (Left)  CYSTOSCOPY, WITH URETERAL STENT INSERTION (Right)  DEBRIDEMENT, ABDOMEN (N/A)  COLECTOMY, PARTIAL  LYSIS, ADHESIONS (N/A)   20 Days Post-Op      Medications:  Continuous Infusions:  Scheduled Meds:   aspirin  81 mg Oral Daily    balsam peru-castor oiL   Topical (Top) BID    clopidogreL  75 mg Oral Daily    ezetimibe  10 mg Oral Daily    famotidine  20 mg Oral BID    furosemide (LASIX) injection  40 mg Intravenous Once    [START ON 2/27/2023] furosemide  20 mg Oral Daily    metoprolol succinate  25 mg Oral Daily    moxifloxacin  400 mg Oral Daily    nystatin  500,000 Units Oral QID    polyethylene glycol  17 g Oral Daily     PRN Meds:   acetaminophen    albuterol    ALPRAZolam    dextrose 10%    dextrose 10%    fluticasone propionate    naloxone    nitroGLYCERIN    ondansetron    oxyCODONE    oxyCODONE    prochlorperazine    simethicone    sodium chloride 0.9%    sodium chloride 0.9%        Objective:     Vital Signs (Most Recent):  Temp: 98.8 °F (37.1 °C) (02/26/23 0821)  Pulse: 97 (02/26/23 0821)  Resp: 16 (02/26/23 0840)  BP: 107/61 (02/26/23 0821)  SpO2: 97 % (02/26/23 0821)   Vital Signs (24h Range):  Temp:  [97.2 °F (36.2 °C)-99.1 °F (37.3 °C)] 98.8 °F (37.1 °C)  Pulse:  [] 97  Resp:  [16-20] 16  SpO2:  [97 %-100 %] 97 %  BP: ()/(47-61) 107/61     Intake/Output - Last 3 Shifts         02/24 0700  02/25 0659 02/25 0700 02/26 0659 02/26 0700 02/27 0659    Urine (mL/kg/hr)  600 (0.5)     Stool  325     Total Output  925     Net  -925            Urine  Occurrence  100 x     Stool Occurrence 0 x 326 x             Physical Exam  General: Alert, oriented, in no apparent distress  HEENT: Sclera anicteric, trachea midline  Lungs: Normal respiratory rate and effort on room air  Abdomen: Soft, minimal renata-incisional TTP, nondistended. Midline incision packed with iodoform superiorly/inferiorly with improving superior renata-incisional erythema and resolved inferior erythema, persistent small volume old hematoma drainage with dressing changes. LUQ end colostomy pink and well perfused with soft stool and flatus in bag.  Extremities: Warm, well perfused, no edema  Neuro: Grossly intact, moves all extremities  Psych: Appropriate affect       Significant Labs:  BMP:   Recent Labs   Lab 02/26/23  0628   *   *   K 4.7   CL 92*   CO2 24   BUN 17   CREATININE 0.7   CALCIUM 8.6*   MG 2.0       CBC:   Recent Labs   Lab 02/24/23  0613   WBC 2.64*   RBC 3.93*   HGB 9.3*   HCT 31.3*      MCV 80*   MCH 23.7*   MCHC 29.7*         Significant Diagnostics:   CT Abd/Pelvis (2/22/2023):   1. Organized rim enhancing fluid collection along the right anterior abdominal wall with extension through the abdominal wall into the subcutaneous soft tissues.  The collection abuts the ascending and transverse colon.  2. Continued suspected small bowel to rectal fistula.  3. Stable splenic infarction.  4. Right-sided ureteral stent in place.  No hydronephrosis.  5. Cardiomegaly.  6. 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] 12/22/2022 Yes    Gross hematuria [R31.0] 02/22/2023 No    Ileus [K56.7] 02/10/2023 No    Severe protein-calorie malnutrition [E43] 07/18/2022 Yes    Hyponatremia [E87.1] 05/15/2022 Yes    Hypertension [I10] 01/24/2022 Yes    Hypercholesterolemia [E78.00] 01/22/2022  Yes    Coronary artery disease [I25.10] 04/19/2021 Yes    Ischemic cardiomyopathy [I25.5] 02/04/2019 Yes    Automatic implantable cardioverter-defibrillator in situ [Z95.810] 02/04/2019 Yes      Problems Resolved During this Admission:    Diagnosis Date Noted Date Resolved POA    Acute cystitis with hematuria [N30.01] 02/13/2023 02/17/2023 Unknown    Preop examination [Z01.818] 02/03/2023 02/06/2023 Not Applicable    Hypotension [I95.9] 05/19/2022 02/06/2023 Yes     Odette Hart is a 67 y.o. year old female with history of HTN, CAD s/p PCI c/b HFrEF (EF 10%), MDD, debility, severe malnutrition, and open cholecystectomy and takedown of colovesical and colovaginal fistuli (two separate areas) with sigmoid colectomy, partial cecectomy and appendectomy, drainage of intra-abdominal abscess and end colostomy 5/9/2022 recently admitted 12/22/22 - 12/30/22 for new colocutaneous fistula admitted 1/32/23 with incarcerated colonic prolapse from her fistula s/p manual reduction now s/p open transverse/descending colectomy with takedown of colocutaneous fistula and end colostomy + parastomal hernia repair + lysis of adhesions >60 minutes (Juliane) + debridement of skin/subcutaneos fat/fascia/muscle (Daniel) 2/7/23, postoperative course complicated by initial ileus since resolved, midline wound hematoma s/p bedside evacuation with cotton-tipped applicator and subsequent iodoform packing, persistent malnutrition (prealbumin 7), and hematuria likely from JJ ureteral stent in setting of ASA/Plavix/LVX.       - BID iodoform dressing changes  - Continue antibiotics  - Holding LVX in setting of hematuria  - Encourage OOB/IS  - Continue diet        LITA DUBON MD  Colorectal Surgery  [facility]

## 2023-02-26 NOTE — ASSESSMENT & PLAN NOTE
Chronic mild hyponatremia on chart review, now moderate on presentation  Found to be in ADHF on arrival  BNP 2600, volume overloaded, JVD present, bilateral LE edema on consultation  CXR with mild pulmonary congestion  Reports poor diet at home (crackers/peanutbutter)  Reduced albumin likely 2/2 to malnutrition    Previously due to ADHF. Worsening back to 125, asymptomatic. Likely due to ADHF again as patient with rhales on examination.     - Urine osm and Na to be collected  - IV lasix 40 mg 1x will consider repeat dosing in AM  - will trend weight and follow up repeat BNP  - strict I/Os  - daily CMP  - replace electrolytes

## 2023-02-26 NOTE — NURSING
B-received report on 67 years old female alert,orient with intermittent confusion,lying in bed on her back with head of bed slightly elevated,O2 @ 2l/min per nasal cannula,breath sounds clear bilateral,bowel sounds active,ileostomy to left side,stoma red/beefy,pure wick intact,urine dark color,coffee/coke color,midline incision,dressing with moderate amount of drainage noted,no signs of hypo/hypertension,will continue to monitor.  I-assess for safety/fall prevention,assess for signs of pain/discomfort,assess for signs of infection,assess for signs of hypo/hypertension,throughout shift.  R-no falls,adhere to taking medications as prescribed by doctor/CRNP,remain infection free/afebrile,pain will remain 0 on a pain scale of 0-10,blood pressure will remain within normal.  P-continue with current plan of care.

## 2023-02-26 NOTE — NURSING
Pt. Specifically requested 5 mg oxycodone IR because it was too early for evening dose of xanax, when I brought her the medication she insisted that she never requested that medication. I am unsure if this is a memory problem or something else.

## 2023-02-26 NOTE — PLAN OF CARE
Problem: Adult Inpatient Plan of Care  Goal: Plan of Care Review  Outcome: Ongoing, Progressing     Problem: Adult Inpatient Plan of Care  Goal: Patient-Specific Goal (Individualized)  Outcome: Ongoing, Progressing     Problem: Adult Inpatient Plan of Care  Goal: Absence of Hospital-Acquired Illness or Injury  Outcome: Ongoing, Progressing     Problem: Fall Injury Risk  Goal: Absence of Fall and Fall-Related Injury  Outcome: Ongoing, Progressing     Problem: Infection  Goal: Absence of Infection Signs and Symptoms  Outcome: Ongoing, Progressing     Problem: Skin Injury Risk Increased  Goal: Skin Health and Integrity  Outcome: Ongoing, Progressing

## 2023-02-26 NOTE — ASSESSMENT & PLAN NOTE
Cardiology consulted for management of ADHF, now signed off  Recent echo showed EF of 10%  Patient hypervolemic on presentation, appears more euvolemic today  Home GDMT: Toprol-XL 25, losartan 25 mg.  Initial CXR without new detrimental changes since prior, BNP 2600 on arrival  1x IV lasix on 2/14. Resolved now on PO lasix 20 mg.    2/23-discussion with patient and with primary about GOC and recommendation for palliative consultation. Patient reports whats most important is extending life even if that means repeated admissions. Patient reports being told previously by cardiology about hospice/palliative and was not receptive at that time. Stated she would consider changing viewpoint if she cannot leave the hospital         Recommendations:   - IV lasix 40 mg and consider increasing PO back to 40 pending response  - HTS and CTS evaluated patient with recommendations against any advanced options given age and debility. Will continue to attempt GDMT titration as tolerated currently only on BB therapy  - replete Mag>2, K>4  - Toprol-XL 25 mg increased with hold parameters  - DC losartan 12.5 mg qd given hypotension   - avoid IV anti-hypertensive  - holding home spironolactone, unable to tolerate full HFrEF GDMT

## 2023-02-26 NOTE — NURSING
Wound care done to sacral area,cleansed with normal saline,pat dry,applied ointment,covered with mepilex.

## 2023-02-27 NOTE — PT/OT/SLP PROGRESS
"Physical Therapy Treatment    Patient Name:  Odette Hart   MRN:  [id number]    Recommendations:     Discharge Recommendations: home health PT  Discharge Equipment Recommendations: none  Barriers to discharge: None    Assessment:     Odette Hart is a 67 y.o. female admitted with a medical diagnosis of Colocutaneous fistula.  She presents with the following impairments/functional limitations: weakness, impaired endurance, impaired self care skills, impaired functional mobility, gait instability, impaired balance, decreased coordination. Pt was receptive and tolerated physical therapy treatment fairly well. Pt did not have nasal cannula donned upon entry but due to pt reporting fatigue, PT assisted with donning of nasal cannula at end of gait trial. Pt able to amb ~70 feet with RW, CGA, and adequate standing rest breaks. Further amb limited due to fatigue. Pt would continue to benefit from acute skilled physical therapy to improve functional mobility and increase activity tolerance.    Rehab Prognosis: Good; patient would benefit from acute skilled PT services to address these deficits and reach maximum level of function.    Recent Surgery: Procedure(s) (LRB):  REVISION, COLOSTOMY (N/A)  INSERTION, CATHETER, URETER, BILATERAL (Left)  CYSTOSCOPY, WITH URETERAL STENT INSERTION (Right)  DEBRIDEMENT, ABDOMEN (N/A)  COLECTOMY, PARTIAL  LYSIS, ADHESIONS (N/A) 21 Days Post-Op    Plan:     During this hospitalization, patient to be seen 3 x/week to address the identified rehab impairments via gait training, therapeutic activities, therapeutic exercises and progress toward the following goals:    Plan of Care Expires:  03/09/23    Subjective     Chief Complaint: weakness and fatigue  Patient/Family Comments/goals: " I need my oxygen"  Pain/Comfort:  Pain Rating 1: other (see comments) (pt did not rate)  Location - Orientation 1: generalized  Location 1: abdomen  Pain Addressed 1: Distraction, Reposition  Pain Rating " Post-Intervention 1: 0/10      Objective:     Communicated with nurse prior to session.  Patient found HOB elevated with PureWick (ileostomy) upon PT entry to room.     General Precautions: Standard, fall  Orthopedic Precautions: N/A  Braces: N/A  Respiratory Status: Nasal cannula, flow 2 L/min     Functional Mobility:  Bed Mobility:     Scooting: stand by assistance  Supine to Sit: stand by assistance  Sit to Supine: stand by assistance  Transfers:     Sit to Stand:  stand by assistance with rolling walker  Gait: ~70' with RW and SBA.   Pt amb with decreased jan/ step length. Pt took 4 standing rest breaks during gait trial.   Amb limited to pt reporting fatigue and needing O2  Balance: Static/dynamic standing balance: fair, pt amb with RW and CGA, able to stand with SBA and no LOB      AM-PAC 6 CLICK MOBILITY  Turning over in bed (including adjusting bedclothes, sheets and blankets)?: 4  Sitting down on and standing up from a chair with arms (e.g., wheelchair, bedside commode, etc.): 3  Moving from lying on back to sitting on the side of the bed?: 3  Moving to and from a bed to a chair (including a wheelchair)?: 3  Need to walk in hospital room?: 3  Climbing 3-5 steps with a railing?: 2  Basic Mobility Total Score: 18       Treatment & Education:  Discussed continued POC with pt who verbalized understanding. Educated pt on importance of increased mobility and amb during hospital stay. Educated pt on safety with mobility.    Patient left HOB elevated with all lines intact and call button in reach..    GOALS:   Multidisciplinary Problems       Physical Therapy Goals          Problem: Physical Therapy    Goal Priority Disciplines Outcome Goal Variances Interventions   Physical Therapy Goal     PT, PT/OT Ongoing, Progressing     Description: Goals to be met by: 2/15/2023, extended to     Patient will increase functional independence with mobility by performin. Gait  x 250 feet with Supervision  using LRAD.   2. Ascend/descend 4 stair with right Handrails Supervision using no Assistive device.   3. Sit to Stand with Supervision using LRAD                       Time Tracking:     PT Received On: 02/27/23  PT Start Time: 0945     PT Stop Time: 1015  PT Total Time (min): 30 min     Billable Minutes: Gait Training 18 and Therapeutic Activity 12    Treatment Type: Treatment  PT/PTA: PT     PTA Visit Number: 0     02/27/2023

## 2023-02-27 NOTE — ASSESSMENT & PLAN NOTE
Cardiology consulted for management of ADHF, now signed off  Recent echo showed EF of 10%  Patient hypervolemic on presentation, appears more euvolemic today  Home GDMT: Toprol-XL 25, losartan 25 mg.  Initial CXR without new detrimental changes since prior, BNP 2600 on arrival  1x IV lasix on 2/14. Resolved now on PO lasix 20 mg.    2/23-discussion with patient and with primary about GOC and recommendation for palliative consultation. Patient reports whats most important is extending life even if that means repeated admissions. Patient reports being told previously by cardiology about hospice/palliative and was not receptive at that time. Stated she would consider changing viewpoint if she cannot leave the hospital         Recommendations:   - IV lasix 40 mg given 2/26 and on PO Lasiz 20, consider additional IV lasix if inadequate U/O with PO   - HTS and CTS evaluated patient with recommendations against any advanced options given age and debility. Will continue to attempt GDMT titration as tolerated currently only on BB therapy  - replete Mag>2, K>4  - Toprol-XL 25 mg increased with hold parameters  - DC losartan 12.5 mg qd given hypotension   - avoid IV anti-hypertensive  - holding home spironolactone, unable to tolerate full HFrEF GDMT

## 2023-02-27 NOTE — PROGRESS NOTES
[facility] Medicine  Progress Note    Patient Name: Odette Hart  MRN: [id number]  Patient Class: IP- Inpatient   Admission Date: 1/31/2023  Length of Stay: 27 days  Attending Physician: Rafa Cardozo MD  Primary Care Provider: Braxton Barragan MD        Subjective:     Principal Problem:Colocutaneous fistula        HPI:  68 yo F with HTN, HLD (statin intolerance), DM, CAD s/p PCI to LAD (2012)/LCX (1/2022), HFrEF (EF10%), debility, severe malnutrition, and the following procedures performed 5/2022: cholecystectomy, takedown of colovesical and colovaginal fistula, sigmoid colectomy, partial cecectomy, end colostomy who presents for colocutaneous fistula repair with abdominal wall reconstruction. On admission she was found to be hyponatremic and in acute on chronic decompensated heart failure. Cardiology was consulted to manage her HF and have since signed off as her volume status has become more euvolemic following diuresis. Medicine was consulted for pr-eop evaluation and continued management of her hyponatremia. Per chart review patient is chronically, mildly hyponatremic at baseline. She takes Lasix, Toprol, spironolactone, and losartan for her HFrEF. She reports correct use of her home medications. She states her diet consists of peanut butter and crackers. Denies CP/SOB/nausea vomiting, cough fever chills, polydipsia. Her Na has been improving with diuresis. Patient has multiple risk factors that complicate surgery: acute on chronic decompensated HF, CAD with PCIx2, hypertension, hyperlipidemia, malnutrition. This being said she does not have reported/documented/observed dysfunction of liver, kidney, lung, clotting.    On examination, she is chronically ill appearing/in no acute distress/at mental baseline. AF, /58 (78), sat 98% on RA, no leukocytosis, hgb 11.8, Hct 38.5, Plts 237, moderate Hyponatremia Na 124 (initially 122 on admission) with labile Baseline, K 3.1, Cl 85, Bun 13,  Cr 00.8, Ca 7.9, albumin 2-3. BNP 2600, UOP 2300 on lasix 80mg    CXR with mild pulmonary congestion      Overview/Hospital Course:  Admitted for planned colocutaneous fistula repair, found to be hyponatremic and in ADHF. Started on IV diuresis, >6 L UOP, returned to euvolemia. Urine/serum studies consistent with hypervolemic hyponatremia most likely in 2/2 to acute on chronic HF. She does have multiple factors that maker her high risk for surgery, primarily her severrely reduced LVEF 10%. Cardiology and medicine consulted for pre-operative evaluation. Agree she is high risk, but need of her procedure outweigh the risks. 2/6 Patient tolerated colocutaneous takedown/reconstruction well. Her Hyponatremia has resolved with Lasix 80mg BID. S/p colocutaneous fistula revision with intra-op R ureteral stent placement for hydronephrosis. Developed post-op ileus. Given lasix holiday for over diuresis. Plavix, ASA restarted post-op. Anticipate discharge on prior home PO lasix dosing.     Complaint of dysuria and dark cloudy urine noted by primary team, patient started on cipro then transitioned to cefpodoxime (broader coverage) given culture results of multiple organisms none in predominance (thus an unequivocal UCx).  Recently discontinued from PCA pump with PRN pain management of oxy 5 and 10 mg. Restarted lasix 20 mg on 2/14/23. CRS monitoring surgical site closely, some output of serous fluid, no signs of cellulitis. Continued symptomatic hypotension despite discontinuing GDMT. CTS and HTS both evaluated patient and deemed there are likely no advanced options given debility and age. Recommended palliative care consultation which was relayed to primary team.    Patient with new onset hematuria and reported dysuira. Call to lab concerning UA completed which demonstrated WBC clumps and no WBC was addend to show just hematuria. Urology was contacted given recent stent placement and plan to evaluate patient. Repeat CT ordered  by CRS which demonstrated likely fluid collection for which CRS plans to manage with continued PO Moxifloxacin. Will monitor for tolerance      Interval History: NAEON. Na uptrending to 128, remains asymptomatic. Receiving  PO lasix. Will continue to monitor U/O and IV lasix needs    Review of Systems   Constitutional:  Negative for chills and fever.   HENT:  Negative for sinus pain.    Eyes:  Negative for pain and visual disturbance.   Respiratory:  Negative for cough and shortness of breath.    Cardiovascular:  Negative for chest pain, palpitations and leg swelling.   Gastrointestinal:  Negative for blood in stool, diarrhea and vomiting.   Genitourinary:  Positive for hematuria. Negative for difficulty urinating and dysuria.   Neurological:  Positive for dizziness, weakness and light-headedness.   Objective:     Vital Signs (Most Recent):  Temp: 97.5 °F (36.4 °C) (02/27/23 0805)  Pulse: 81 (02/27/23 0805)  Resp: 18 (02/27/23 0805)  BP: (!) 100/51 (02/27/23 0805)  SpO2: 95 % (02/27/23 0805)   Vital Signs (24h Range):  Temp:  [97.5 °F (36.4 °C)-99.1 °F (37.3 °C)] 97.5 °F (36.4 °C)  Pulse:  [74-94] 81  Resp:  [16-18] 18  SpO2:  [94 %-97 %] 95 %  BP: ()/(45-66) 100/51     Weight: 51.3 kg (113 lb)  Body mass index is 20.02 kg/m².    Intake/Output Summary (Last 24 hours) at 2/27/2023 0988  Last data filed at 2/27/2023 0926  Gross per 24 hour   Intake 590 ml   Output 1535 ml   Net -945 ml        Physical Exam  Vitals and nursing note reviewed.   Constitutional:       General: She is not in acute distress.     Appearance: She is ill-appearing.   HENT:      Head: Normocephalic and atraumatic.      Right Ear: External ear normal.      Left Ear: External ear normal.      Nose: No congestion or rhinorrhea.   Eyes:      General:         Right eye: No discharge.         Left eye: No discharge.      Extraocular Movements: Extraocular movements intact.      Pupils: Pupils are equal, round, and reactive to light.    Cardiovascular:      Rate and Rhythm: Normal rate and regular rhythm.      Heart sounds: Normal heart sounds. No murmur heard.  Pulmonary:      Effort: No respiratory distress.      Breath sounds: Rales (bibasilar) present. No wheezing.   Abdominal:      General: There is no distension.      Comments: Colostomy, vertical/midline abdominal excision, bandage clean/dry   Musculoskeletal:         General: No tenderness.      Cervical back: Normal range of motion. No rigidity.      Right lower leg: No edema.      Left lower leg: No edema.   Skin:     Findings: Bruising present. No erythema.   Neurological:      General: No focal deficit present.      Mental Status: She is alert and oriented to person, place, and time.   Psychiatric:         Mood and Affect: Mood normal.         Behavior: Behavior normal.       Significant Labs: All pertinent labs within the past 24 hours have been reviewed.  CBC:   Recent Labs   Lab 02/27/23  0307   WBC 4.68   HGB 9.2*   HCT 30.7*        CMP:   Recent Labs   Lab 02/26/23  0628 02/27/23  0307   * 128*   K 4.7 3.6   CL 92* 91*   CO2 24 30*   * 123*   BUN 17 19   CREATININE 0.7 0.7   CALCIUM 8.6* 8.5*   ANIONGAP 9 7*       Magnesium:   Recent Labs   Lab 02/26/23 0628 02/27/23  0307   MG 2.0 1.8         Significant Imaging: I have reviewed all pertinent imaging results/findings within the past 24 hours.      Assessment/Plan:      * Colocutaneous fistula  S/p colocutaneous fistula revision with intra-op R ureteral stent placement for hydronephrosis. Developed post-op ileus.   Required post-op PCA pump now discontinued and on PRN opioids    Repeat CT AP demonstrated fluid collection and CRP increased as well. Discussion with CRS with plan for starting PO moxifloxacin and if worsening symptoms or fevers likely need escalation to IV abx and ID consultation given allergies and limited options.     -  PO moxifloxacin  - oxy Q4 PRN and continue bowel regimen  - Continue Wound  care  - per primary     Gross hematuria  Seen by urology and stated that hematuria 2/2 stent placement. No acute intervention.     Continue to monitor    Ileus  Patient has Post-operative ileus which is adynamic in etiology which is improving. This is inherent to her procedure/medications. Will treat conservatively with bowel rest, IV fluids, serial abdominal exams and avoidance of GI paralytics such as narcotics or anti-spasmodics. Monitor patient closely.     Diet advanced 2/12; resolved    Severe protein-calorie malnutrition  Nutrition consulted. Most recent weight and BMI monitored-                         Measurements:  Wt Readings from Last 1 Encounters:   02/26/23 51.3 kg (113 lb)   Body mass index is 20.02 kg/m².    Recommendations: Recommendation/Intervention: 1) Continue clear liquid   2) Continue Boost Plus & Boost Breeze  3) RD following  Goals: meets % een/epn by next rd f/u    Patient has been screened and assessed by RD. RD will follow patient.      Hyponatremia  Chronic mild hyponatremia on chart review, now moderate on presentation  Found to be in ADHF on arrival  BNP 2600, volume overloaded, JVD present, bilateral LE edema on consultation  CXR with mild pulmonary congestion  Reports poor diet at home (crackers/peanutbutter)  Reduced albumin likely 2/2 to malnutrition    Previously due to ADHF. Worsening back to 125, asymptomatic. Likely due to ADHF again as patient with rhales on examination.     - Urine 543, urine Na 24  - IV lasix 40 mg given on 2/27 with good improvement.   - will trend weight and follow up repeat BNP  - strict I/Os  - daily CMP  - replace electrolytes        Hypertension  Now Hypotension.  Intermittent difficulties with hypotension while inpatient. Unable to tolerate full HFrEF GDMT.   Continued hypotension while admitted and symptomatic with ambulation. Will continue to titrate.     - Hold GDMT if SBP<95  - encourage PO intake     Hypercholesterolemia  Lipids well  controlled  Has documented statin intolerance    - continue ezetimibe    Coronary artery disease  History of Cad with PCIx2 LAD in 2012 and LCX 1/2022  Home regimen: DAPT    No further surgical intervention planned inpatient by CRS or urology.   Reported episode of chest pain 2/19 that was reproducible with palpation on examination and no reported episodes while patient ambulating and working with PT though LH and dizziness did occur. EKG and trop reassuring.    -  aspirin  - clopidogrel  -  BB as tolerated  - no statin 2/2 intolerance    Automatic implantable cardioverter-defibrillator in situ        Ischemic cardiomyopathy  Cardiology consulted for management of ADHF, now signed off  Recent echo showed EF of 10%  Patient hypervolemic on presentation, appears more euvolemic today  Home GDMT: Toprol-XL 25, losartan 25 mg.  Initial CXR without new detrimental changes since prior, BNP 2600 on arrival  1x IV lasix on 2/14. Resolved now on PO lasix 20 mg.    2/23-discussion with patient and with primary about GOC and recommendation for palliative consultation. Patient reports whats most important is extending life even if that means repeated admissions. Patient reports being told previously by cardiology about hospice/palliative and was not receptive at that time. Stated she would consider changing viewpoint if she cannot leave the hospital         Recommendations:   - IV lasix 40 mg given 2/26 and on PO Lasiz 20, consider additional IV lasix if inadequate U/O with PO   - HTS and CTS evaluated patient with recommendations against any advanced options given age and debility. Will continue to attempt GDMT titration as tolerated currently only on BB therapy  - replete Mag>2, K>4  - Toprol-XL 25 mg increased with hold parameters  - DC losartan 12.5 mg qd given hypotension   - avoid IV anti-hypertensive  - holding home spironolactone, unable to tolerate full HFrEF GDMT      VTE Risk Mitigation (From admission, onward)          Ordered     Place sequential compression device  Until discontinued         02/06/23 1447     IP VTE HIGH RISK PATIENT  Once         02/06/23 1447                Discharge Planning   SIVAN: 2/28/2023     Code Status: Full Code   Is the patient medically ready for discharge?: No    Reason for patient still in hospital (select all that apply): Patient trending condition  Discharge Plan A: Home Health   Discharge Delays: None known at this time              Nancy Kaur DO  Department of Hospital Medicine   [facility]

## 2023-02-27 NOTE — NURSING
Pt's Ileostomy was leaking.  Nurse emptied and changed collection bag.  Nurse changed purewick and tried to clean Pt's bottom and add ointment.  Pt became angry and said that she did not want another mepilex foam dressing and that there was a wound on her sacrum. Sacrum area is red and there is no broken skin areas presently.  A healed area around coccyx area noted. Nurse tried to explain to Pt that mepilex dressing is sometimes added to sacrum area for just redness.  Pt became angry again and said to leave mepilex off.  Nurse cleaned area applied mary oil ointment to red area and left area open to air w/ new clean purewick in place.

## 2023-02-27 NOTE — ASSESSMENT & PLAN NOTE
OR 2/6/23 revision colostomy (see op note)    -await return of bowel function, colostomy funcitonal  -continue multi modality for pain control  -encourage ambulation and use of IS  -phani hose and SCD  -prophalactic lovenox and PPI  -cont local wound care

## 2023-02-27 NOTE — ASSESSMENT & PLAN NOTE
Cont home meds  appeciate medicine assistance with multiple medical problems  Monitor vs  monitior labs

## 2023-02-27 NOTE — NURSING
Paged Dr. Cardozo on call to inform of Pt's BP of 81/46 and holding metoprolol and lasix until ok by MD.  Pt also asking for PRN pain med and nurse wanted to inform about BP before giving pain med

## 2023-02-27 NOTE — ASSESSMENT & PLAN NOTE
Chronic, controlled.  Latest blood pressure and vitals reviewed-   Temp:  [97.5 °F (36.4 °C)-99.1 °F (37.3 °C)]   Pulse:  [74-94]   Resp:  [16-18]   BP: ()/(44-55)   SpO2:  [94 %-97 %] .   Home meds for hypertension were reviewed and noted below.   Hypertension Medications             furosemide (LASIX) 40 MG tablet Take 1 tablet (40 mg total) by mouth once daily.    losartan (COZAAR) 25 MG tablet Take 1 tablet (25 mg total) by mouth once daily.    metoprolol succinate (TOPROL-XL) 25 MG 24 hr tablet Take 1 tablet (25 mg total) by mouth once daily.    nitroGLYCERIN (NITROSTAT) 0.4 MG SL tablet Place 1 tablet (0.4 mg total) under the tongue every 5 (five) minutes as needed for Chest pain.    spironolactone (ALDACTONE) 25 MG tablet Take 0.5 tablets (12.5 mg total) by mouth once daily.          While in the hospital, will manage blood pressure as follows; Continue home antihypertensive regimen    Will utilize p.r.n. blood pressure medication only if patient's blood pressure greater than  180/110 and she develops symptoms such as worsening chest pain or shortness of breath.

## 2023-02-27 NOTE — PROGRESS NOTES
[facility]  Colorectal Surgery  Progress Note    Patient Name: Odette Hart  MRN: [id number]  Admission Date: 1/31/2023  Hospital Length of Stay: 27 days  Attending Physician: Rafa Cardozo MD    Subjective:     Interval History: no acute events overnite, not eatging much, colostomy functional, working with pt/ot, adequate pain control    Post-Op Info:  Procedure(s) (LRB):  REVISION, COLOSTOMY (N/A)  INSERTION, CATHETER, URETER, BILATERAL (Left)  CYSTOSCOPY, WITH URETERAL STENT INSERTION (Right)  DEBRIDEMENT, ABDOMEN (N/A)  COLECTOMY, PARTIAL  LYSIS, ADHESIONS (N/A)   21 Days Post-Op      Medications:  Continuous Infusions:  Scheduled Meds:   aspirin  81 mg Oral Daily    balsam peru-castor oiL   Topical (Top) BID    clopidogreL  75 mg Oral Daily    ezetimibe  10 mg Oral Daily    famotidine  20 mg Oral BID    furosemide  20 mg Oral Daily    metoprolol succinate  25 mg Oral Daily    moxifloxacin  400 mg Oral Daily    nystatin  500,000 Units Oral QID    polyethylene glycol  17 g Oral Daily     PRN Meds:   acetaminophen    albuterol    ALPRAZolam    dextrose 10%    dextrose 10%    fluticasone propionate    naloxone    nitroGLYCERIN    ondansetron    oxyCODONE    oxyCODONE    prochlorperazine    simethicone    sodium chloride 0.9%    sodium chloride 0.9%        Objective:     Vital Signs (Most Recent):  Temp: 97.8 °F (36.6 °C) (02/27/23 1114)  Pulse: 78 (02/27/23 1114)  Resp: 16 (02/27/23 1124)  BP: (!) 93/44 (02/27/23 1114)  SpO2: 97 % (02/27/23 1114)   Vital Signs (24h Range):  Temp:  [97.5 °F (36.4 °C)-99.1 °F (37.3 °C)] 97.8 °F (36.6 °C)  Pulse:  [74-94] 78  Resp:  [16-18] 16  SpO2:  [94 %-97 %] 97 %  BP: ()/(44-55) 93/44     Intake/Output - Last 3 Shifts         02/25 0700  02/26 0659 02/26 0700 02/27 0659 02/27 0700 02/28 0659    P.O.  590     Total Intake(mL/kg)  590 (11.5)     Urine (mL/kg/hr) 600 (0.5) 1400 (1.1)     Stool 325 75 60    Total Output 756 7138 60     Net -925 -885 -60           Urine Occurrence 100 x      Stool Occurrence 326 x 0 x             Physical Exam  Vitals and nursing note reviewed.   Constitutional:       Appearance: She is well-developed.   HENT:      Head: Normocephalic.   Eyes:      Pupils: Pupils are equal, round, and reactive to light.   Cardiovascular:      Rate and Rhythm: Normal rate and regular rhythm.      Heart sounds: Normal heart sounds.   Pulmonary:      Effort: Pulmonary effort is normal. No respiratory distress.      Breath sounds: Normal breath sounds. No wheezing or rales.   Abdominal:      Palpations: Abdomen is soft. There is no mass.      Tenderness: There is no guarding or rebound.      Comments: Abd inc line healing well  Colostomy functional   Skin:     General: Skin is warm and dry.   Neurological:      Mental Status: She is alert and oriented to person, place, and time.           Significant Labs:  BMP (Last 3 Results):   Recent Labs   Lab 02/25/23  0557 02/26/23  0628 02/27/23  0307   * 134* 123*   * 125* 128*   K 3.7 4.7 3.6   CL 92* 92* 91*   CO2 28 24 30*   BUN 13 17 19   CREATININE 0.7 0.7 0.7   CALCIUM 8.5* 8.6* 8.5*   MG 1.6 2.0 1.8     CBC (Last 3 Results):   Recent Labs   Lab 02/23/23  0636 02/24/23  0613 02/27/23  0307   WBC 2.59* 2.64* 4.68   RBC 3.99* 3.93* 3.86*   HGB 9.6* 9.3* 9.2*   HCT 31.1* 31.3* 30.7*    382 334   MCV 78* 80* 80*   MCH 24.1* 23.7* 23.8*   MCHC 30.9* 29.7* 30.0*       Significant Diagnostics:  None    Assessment/Plan:     * Colocutaneous fistula  OR 2/6/23 revision colostomy (see op note)    -await return of bowel function, colostomy funcitonal  -continue multi modality for pain control  -encourage ambulation and use of IS  -phani hose and SCD  -prophalactic lovenox and PPI  -cont local wound care    Ileus  Post op ileus not surgical complication but expected outcome  Ileus now resolved       Severe protein-calorie malnutrition  Nutrition consulted. Most recent weight and BMI  monitored-                         Measurements:  Wt Readings from Last 1 Encounters:   02/26/23 51.3 kg (113 lb)   Body mass index is 20.02 kg/m².    Recommendations: Recommendation/Intervention: 1) Continue clear liquid   2) Continue Boost Plus & Boost Breeze  3) RD following  Goals: meets % een/epn by next rd f/u    Patient has been screened and assessed by RD. RD will follow patient.      Hyponatremia  Monitor labs  Appreciate medicine input    Hypertension  Chronic, controlled.  Latest blood pressure and vitals reviewed-   Temp:  [97.5 °F (36.4 °C)-99.1 °F (37.3 °C)]   Pulse:  [74-94]   Resp:  [16-18]   BP: ()/(44-55)   SpO2:  [94 %-97 %] .   Home meds for hypertension were reviewed and noted below.   Hypertension Medications             furosemide (LASIX) 40 MG tablet Take 1 tablet (40 mg total) by mouth once daily.    losartan (COZAAR) 25 MG tablet Take 1 tablet (25 mg total) by mouth once daily.    metoprolol succinate (TOPROL-XL) 25 MG 24 hr tablet Take 1 tablet (25 mg total) by mouth once daily.    nitroGLYCERIN (NITROSTAT) 0.4 MG SL tablet Place 1 tablet (0.4 mg total) under the tongue every 5 (five) minutes as needed for Chest pain.    spironolactone (ALDACTONE) 25 MG tablet Take 0.5 tablets (12.5 mg total) by mouth once daily.          While in the hospital, will manage blood pressure as follows; Continue home antihypertensive regimen    Will utilize p.r.n. blood pressure medication only if patient's blood pressure greater than  180/110 and she develops symptoms such as worsening chest pain or shortness of breath.    Coronary artery disease  Cont home meds  appreciate medicine input    Automatic implantable cardioverter-defibrillator in situ  Cont tele    Ischemic cardiomyopathy  Cont home meds  appeciate medicine assistance with multiple medical problems  Monitor vs  monitior labs          Carola Buckley NP  Colorectal Surgery  [facility]

## 2023-02-27 NOTE — NURSING
Spoke to Magdalena Mack from [employer] [facility], and notified her of Pt's BP and whether or not to hold scheduled meds, metoprolol and lasix, and that Pt wants her PRN pain med also.  Frederick said she would contact fellow surgeon and inquire.

## 2023-02-27 NOTE — ASSESSMENT & PLAN NOTE
Seen by urology and stated that hematuria 2/2 stent placement. No acute intervention.     Continue to monitor

## 2023-02-27 NOTE — ASSESSMENT & PLAN NOTE
Nutrition consulted. Most recent weight and BMI monitored-                         Measurements:  Wt Readings from Last 1 Encounters:   02/26/23 51.3 kg (113 lb)   Body mass index is 20.02 kg/m².    Recommendations: Recommendation/Intervention: 1) Continue clear liquid   2) Continue Boost Plus & Boost Breeze  3) RD following  Goals: meets % een/epn by next rd f/u    Patient has been screened and assessed by RD. RD will follow patient.

## 2023-02-27 NOTE — PT/OT/SLP PROGRESS
Occupational Therapy      Patient Name:  Odette Hart   MRN:  [id number]    Patient not seen today secondary to declining participation due to fatigue. Will follow-up.    2/27/2023

## 2023-02-27 NOTE — PLAN OF CARE
- A/0x4, 2LNC, VSS, pain well controlled with PRN medications.   - Rosanna dial, benson.   - Very little appetite    Problem: Adult Inpatient Plan of Care  Goal: Plan of Care Review  Outcome: Ongoing, Progressing     Problem: Fall Injury Risk  Goal: Absence of Fall and Fall-Related Injury  Outcome: Ongoing, Progressing

## 2023-02-27 NOTE — SUBJECTIVE & OBJECTIVE
Subjective:     Interval History: no acute events overnite, not eatging much, colostomy functional, working with pt/ot, adequate pain control    Post-Op Info:  Procedure(s) (LRB):  REVISION, COLOSTOMY (N/A)  INSERTION, CATHETER, URETER, BILATERAL (Left)  CYSTOSCOPY, WITH URETERAL STENT INSERTION (Right)  DEBRIDEMENT, ABDOMEN (N/A)  COLECTOMY, PARTIAL  LYSIS, ADHESIONS (N/A)   21 Days Post-Op      Medications:  Continuous Infusions:  Scheduled Meds:   aspirin  81 mg Oral Daily    balsam peru-castor oiL   Topical (Top) BID    clopidogreL  75 mg Oral Daily    ezetimibe  10 mg Oral Daily    famotidine  20 mg Oral BID    furosemide  20 mg Oral Daily    metoprolol succinate  25 mg Oral Daily    moxifloxacin  400 mg Oral Daily    nystatin  500,000 Units Oral QID    polyethylene glycol  17 g Oral Daily     PRN Meds:   acetaminophen    albuterol    ALPRAZolam    dextrose 10%    dextrose 10%    fluticasone propionate    naloxone    nitroGLYCERIN    ondansetron    oxyCODONE    oxyCODONE    prochlorperazine    simethicone    sodium chloride 0.9%    sodium chloride 0.9%        Objective:     Vital Signs (Most Recent):  Temp: 97.8 °F (36.6 °C) (02/27/23 1114)  Pulse: 78 (02/27/23 1114)  Resp: 16 (02/27/23 1124)  BP: (!) 93/44 (02/27/23 1114)  SpO2: 97 % (02/27/23 1114)   Vital Signs (24h Range):  Temp:  [97.5 °F (36.4 °C)-99.1 °F (37.3 °C)] 97.8 °F (36.6 °C)  Pulse:  [74-94] 78  Resp:  [16-18] 16  SpO2:  [94 %-97 %] 97 %  BP: ()/(44-55) 93/44     Intake/Output - Last 3 Shifts         02/25 0700 02/26 0659 02/26 0700 02/27 0659 02/27 0700  02/28 0659    P.O.  590     Total Intake(mL/kg)  590 (11.5)     Urine (mL/kg/hr) 600 (0.5) 1400 (1.1)     Stool 325 75 60    Total Output 925 1475 60    Net -925 -885 -60           Urine Occurrence 100 x      Stool Occurrence 326 x 0 x             Physical Exam  Vitals and nursing note reviewed.   Constitutional:       Appearance: She is well-developed.   HENT:      Head:  Normocephalic.   Eyes:      Pupils: Pupils are equal, round, and reactive to light.   Cardiovascular:      Rate and Rhythm: Normal rate and regular rhythm.      Heart sounds: Normal heart sounds.   Pulmonary:      Effort: Pulmonary effort is normal. No respiratory distress.      Breath sounds: Normal breath sounds. No wheezing or rales.   Abdominal:      Palpations: Abdomen is soft. There is no mass.      Tenderness: There is no guarding or rebound.      Comments: Abd inc line healing well  Colostomy functional   Skin:     General: Skin is warm and dry.   Neurological:      Mental Status: She is alert and oriented to person, place, and time.           Significant Labs:  BMP (Last 3 Results):   Recent Labs   Lab 02/25/23  0557 02/26/23  0628 02/27/23  0307   * 134* 123*   * 125* 128*   K 3.7 4.7 3.6   CL 92* 92* 91*   CO2 28 24 30*   BUN 13 17 19   CREATININE 0.7 0.7 0.7   CALCIUM 8.5* 8.6* 8.5*   MG 1.6 2.0 1.8     CBC (Last 3 Results):   Recent Labs   Lab 02/23/23  0636 02/24/23  0613 02/27/23  0307   WBC 2.59* 2.64* 4.68   RBC 3.99* 3.93* 3.86*   HGB 9.6* 9.3* 9.2*   HCT 31.1* 31.3* 30.7*    382 334   MCV 78* 80* 80*   MCH 24.1* 23.7* 23.8*   MCHC 30.9* 29.7* 30.0*       Significant Diagnostics:  None

## 2023-02-27 NOTE — ASSESSMENT & PLAN NOTE
Chronic mild hyponatremia on chart review, now moderate on presentation  Found to be in ADHF on arrival  BNP 2600, volume overloaded, JVD present, bilateral LE edema on consultation  CXR with mild pulmonary congestion  Reports poor diet at home (crackers/peanutbutter)  Reduced albumin likely 2/2 to malnutrition    Previously due to ADHF. Worsening back to 125, asymptomatic. Likely due to ADHF again as patient with rhales on examination.     - Urine 543, urine Na 24  - IV lasix 40 mg given on 2/27 with good improvement.   - will trend weight and follow up repeat BNP  - strict I/Os  - daily CMP  - replace electrolytes

## 2023-02-27 NOTE — SUBJECTIVE & OBJECTIVE
Interval History: NAEON. Na uptrending to 128, remains asymptomatic. Receiving  PO lasix. Will continue to monitor U/O and IV lasix needs    Review of Systems   Constitutional:  Negative for chills and fever.   HENT:  Negative for sinus pain.    Eyes:  Negative for pain and visual disturbance.   Respiratory:  Negative for cough and shortness of breath.    Cardiovascular:  Negative for chest pain, palpitations and leg swelling.   Gastrointestinal:  Negative for blood in stool, diarrhea and vomiting.   Genitourinary:  Positive for hematuria. Negative for difficulty urinating and dysuria.   Neurological:  Positive for dizziness, weakness and light-headedness.   Objective:     Vital Signs (Most Recent):  Temp: 97.5 °F (36.4 °C) (02/27/23 0805)  Pulse: 81 (02/27/23 0805)  Resp: 18 (02/27/23 0805)  BP: (!) 100/51 (02/27/23 0805)  SpO2: 95 % (02/27/23 0805)   Vital Signs (24h Range):  Temp:  [97.5 °F (36.4 °C)-99.1 °F (37.3 °C)] 97.5 °F (36.4 °C)  Pulse:  [74-94] 81  Resp:  [16-18] 18  SpO2:  [94 %-97 %] 95 %  BP: ()/(45-66) 100/51     Weight: 51.3 kg (113 lb)  Body mass index is 20.02 kg/m².    Intake/Output Summary (Last 24 hours) at 2/27/2023 0954  Last data filed at 2/27/2023 0926  Gross per 24 hour   Intake 590 ml   Output 1535 ml   Net -945 ml        Physical Exam  Vitals and nursing note reviewed.   Constitutional:       General: She is not in acute distress.     Appearance: She is ill-appearing.   HENT:      Head: Normocephalic and atraumatic.      Right Ear: External ear normal.      Left Ear: External ear normal.      Nose: No congestion or rhinorrhea.   Eyes:      General:         Right eye: No discharge.         Left eye: No discharge.      Extraocular Movements: Extraocular movements intact.      Pupils: Pupils are equal, round, and reactive to light.   Cardiovascular:      Rate and Rhythm: Normal rate and regular rhythm.      Heart sounds: Normal heart sounds. No murmur heard.  Pulmonary:      Effort:  No respiratory distress.      Breath sounds: Rales (bibasilar) present. No wheezing.   Abdominal:      General: There is no distension.      Comments: Colostomy, vertical/midline abdominal excision, bandage clean/dry   Musculoskeletal:         General: No tenderness.      Cervical back: Normal range of motion. No rigidity.      Right lower leg: No edema.      Left lower leg: No edema.   Skin:     Findings: Bruising present. No erythema.   Neurological:      General: No focal deficit present.      Mental Status: She is alert and oriented to person, place, and time.   Psychiatric:         Mood and Affect: Mood normal.         Behavior: Behavior normal.       Significant Labs: All pertinent labs within the past 24 hours have been reviewed.  CBC:   Recent Labs   Lab 02/27/23 0307   WBC 4.68   HGB 9.2*   HCT 30.7*        CMP:   Recent Labs   Lab 02/26/23 0628 02/27/23  0307   * 128*   K 4.7 3.6   CL 92* 91*   CO2 24 30*   * 123*   BUN 17 19   CREATININE 0.7 0.7   CALCIUM 8.6* 8.5*   ANIONGAP 9 7*       Magnesium:   Recent Labs   Lab 02/26/23 0628 02/27/23  0307   MG 2.0 1.8         Significant Imaging: I have reviewed all pertinent imaging results/findings within the past 24 hours.

## 2023-02-28 PROBLEM — Z71.89 GOALS OF CARE, COUNSELING/DISCUSSION: Status: ACTIVE | Noted: 2023-01-01

## 2023-02-28 NOTE — ASSESSMENT & PLAN NOTE
Odette Hart is a 67-year-old woman with a history of CAD s/p PCI to LAD (2012) and LCx (1/2022), ischemic cardiomyopathy with reduced EF (10%), open cholecystectomy and takedown of colovesical and colovaginal fistuli (two separate areas) with sigmoid colectomy, partial cecectomy and appendectomy, drainage of intra-abdominal abscess and end colostomy 5/9/2022 recently admitted 12/22/22 - 12/30/22 for new colocutaneous fistula admitted 1/32/23 with incarcerated colonic prolapse from her fistula s/p manual reduction now s/p open transverse/descending colectomy with takedown of colocutaneous fistula and end colostomy + parastomal hernia repair + lysis of adhesions + debridement of skin/subcutaneos fat/fascia/muscle (Daniel) 2/7/23, postoperative course complicated by initial ileus since resolved, midline wound hematoma s/p bedside evacuation with cotton-tipped applicator and subsequent iodoform packing, severe protein calorie malnutrition, severe deconditioning and hematuria likely from JJ ureteral stent in setting of ASA/Plavix/LVX for whom Palliative and Supportive Care was consulted to explore goals of care.    Advance Care Planning   Goals of Care:  - Code status: Full code  - HCPOA: Abelardo Ortiz (son) [phone]   - Second HCPOA: son Telly Ortiz [phone]   - HCPOA signed on 2-28-23, will upload to EMR  - Patient has decision making capacity  - Prognosis: poor  - Patient's understanding of prognosis: limited  - Goals: restore independence, life prolongation even at expense of quality of life  - Recommendations: continue current level of care  - There is a lot of hesitancy and deferment during our encounter. Though she has consistently identified Abelardo as her desired MPOA in prior encounters (since July 2022) she responds that she would have to talk to her son about it first, though these conversations were initiated since July 2022. She expresses preference for when we are allowed to talk to her, and notes  the inconvenience of our visit during her lunch time (at 1PM) as well. These may be manifestations of her need for some sense of control in a situation and environment that has taken away a significant amount of her power and control. Her situation is further complicated by the lack of social support, as described in her initial palliative care consult note from July 2022 and again in August 2022. While she believes that her abdominal surgeries are what are affecting her health the most, she denies that her heart is the prominent issue. On discussion with the primary colorectal team, there is an expectation that she will heal from the successful abdominal interventions, but that truly her life-limiting diagnoses are her advanced heart failure and severe deconditioning. While her understanding of her health may be limited, I sense that there is a contributing factor of anticipatory grief and sadness of her loss of independence that is a barrier to her acceptance of the severity of her truly life-limiting illness, and not a delusional disorder. She has dismissed our services at this time. If she is open to exploring goals again in the future with additional counseling of the severity of her multimorbidity, please do not hesitate to reach back out to us.    Goals of Care Conversation:  - 2/28/2023: I met Odette Tanner in her hospital room along with palliative care  JAIRO Hernandez. She asks us initially to step behind a curtain so that her RN can help her readjust herself in the recliner. When she is done, she allows us to come in, tells us without prompting that her colon surgery probably didn't go well. She expresses worry that she won't heal the way she did from her heart attack. She emphasizes to us that she is not hospitalized for her heart, that there is nothing wrong with her heart currently. She reports feeling down/depressed because during her entire month hospitalized, she has had only two  "visitors. She shares that she felt like she wanted to give up, but her surgeon told her, "No, don't give up! You have to keep going. We're going to get you into our rehab." I told her that I read through her medical chart and saw that she had identified her son Abelardo as her desired MPOA. I showed her the blank HCPOA form and offered to fill it out with her. She responded, "The nurse told me that I should tell my son that I'm going to make him my medical power of  before I sign it." Agreed with her. Of note, there are two notes from palliative care in [facility] with excellent documentation of HCPOA/ACP discussions and her consistently identifying Abelardo as her desired HCPOA. Discussed that without the HCPOA form, both sons Telly and Abelardo would be her surrogate decision maker and they would be required to work together to make medical decisions on her behalf if she is unable to do so. She asked what the point of filling out the HCPOA form was if they were her default surrogate decision maker. Shared that if she desired for one person over the other, or even someone else different, then this form would reflect her wishes and the doctors/nurses would abide by her preference. She eventually did sign the HCPOA form, expressing that she feels that Abelardo will be more likely to want to keep her alive and give her a chance. She tells me without prompting that she is not ready to "give up and die" and that she looks forward to getting stronger with rehab.       "

## 2023-02-28 NOTE — SUBJECTIVE & OBJECTIVE
Subjective:     Interval History: no acute events overnite, appetite poor, working with PT/OT, colostomy functional, no n/v    Post-Op Info:  Procedure(s) (LRB):  REVISION, COLOSTOMY (N/A)  INSERTION, CATHETER, URETER, BILATERAL (Left)  CYSTOSCOPY, WITH URETERAL STENT INSERTION (Right)  DEBRIDEMENT, ABDOMEN (N/A)  COLECTOMY, PARTIAL  LYSIS, ADHESIONS (N/A)   22 Days Post-Op      Medications:  Continuous Infusions:  Scheduled Meds:   aspirin  81 mg Oral Daily    balsam peru-castor oiL   Topical (Top) BID    clopidogreL  75 mg Oral Daily    ezetimibe  10 mg Oral Daily    famotidine  20 mg Oral BID    furosemide  20 mg Oral Daily    metoprolol succinate  25 mg Oral Daily    moxifloxacin  400 mg Oral Daily    nystatin  500,000 Units Oral QID    polyethylene glycol  17 g Oral Daily     PRN Meds:   acetaminophen    albuterol    ALPRAZolam    dextrose 10%    dextrose 10%    fluticasone propionate    naloxone    nitroGLYCERIN    ondansetron    oxyCODONE    oxyCODONE    prochlorperazine    simethicone    sodium chloride 0.9%    sodium chloride 0.9%        Objective:     Vital Signs (Most Recent):  Temp: 97.3 °F (36.3 °C) (02/28/23 0817)  Pulse: 72 (02/28/23 1003)  Resp: 18 (02/28/23 1003)  BP: (!) 100/56 (02/28/23 0817)  SpO2: 95 % (02/28/23 0817)   Vital Signs (24h Range):  Temp:  [97.3 °F (36.3 °C)-98.5 °F (36.9 °C)] 97.3 °F (36.3 °C)  Pulse:  [70-79] 72  Resp:  [16-18] 18  SpO2:  [95 %-99 %] 95 %  BP: ()/(47-56) 100/56     Intake/Output - Last 3 Shifts         02/26 0700 02/27 0659 02/27 0700 02/28 0659 02/28 0700  03/01 0659    P.O. 590 880     Total Intake(mL/kg) 590 (11.5) 880 (17.2)     Urine (mL/kg/hr) 1400 (1.1) 800 (0.6)     Stool 75 140     Total Output 1475 940     Net -885 -60            Urine Occurrence  1 x     Stool Occurrence 0 x              Physical Exam  Vitals and nursing note reviewed.   Constitutional:       Appearance: She is well-developed.   HENT:      Head: Normocephalic.   Eyes:       Pupils: Pupils are equal, round, and reactive to light.   Cardiovascular:      Rate and Rhythm: Normal rate and regular rhythm.      Heart sounds: Normal heart sounds.   Pulmonary:      Effort: Pulmonary effort is normal. No respiratory distress.      Breath sounds: Normal breath sounds. No wheezing or rales.   Abdominal:      Palpations: Abdomen is soft. There is no mass.      Tenderness: There is no guarding or rebound.      Comments: Abd wound packing in place, erythema improved   Skin:     General: Skin is warm and dry.   Neurological:      Mental Status: She is alert and oriented to person, place, and time.           Significant Labs:  BMP (Last 3 Results):   Recent Labs   Lab 02/26/23  0628 02/27/23  0307 02/28/23  0405   * 123* 95   * 128* 131*   K 4.7 3.6 4.0   CL 92* 91* 94*   CO2 24 30* 30*   BUN 17 19 18   CREATININE 0.7 0.7 0.7   CALCIUM 8.6* 8.5* 8.6*   MG 2.0 1.8 1.8     CBC (Last 3 Results):   Recent Labs   Lab 02/23/23  0636 02/24/23  0613 02/27/23  0307   WBC 2.59* 2.64* 4.68   RBC 3.99* 3.93* 3.86*   HGB 9.6* 9.3* 9.2*   HCT 31.1* 31.3* 30.7*    382 334   MCV 78* 80* 80*   MCH 24.1* 23.7* 23.8*   MCHC 30.9* 29.7* 30.0*       Significant Diagnostics:  None

## 2023-02-28 NOTE — ASSESSMENT & PLAN NOTE
Chronic, controlled.  Latest blood pressure and vitals reviewed-   Temp:  [97.3 °F (36.3 °C)-98.5 °F (36.9 °C)]   Pulse:  [70-79]   Resp:  [16-18]   BP: ()/(47-56)   SpO2:  [95 %-99 %] .   Home meds for hypertension were reviewed and noted below.   Hypertension Medications             furosemide (LASIX) 40 MG tablet Take 1 tablet (40 mg total) by mouth once daily.    losartan (COZAAR) 25 MG tablet Take 1 tablet (25 mg total) by mouth once daily.    metoprolol succinate (TOPROL-XL) 25 MG 24 hr tablet Take 1 tablet (25 mg total) by mouth once daily.    nitroGLYCERIN (NITROSTAT) 0.4 MG SL tablet Place 1 tablet (0.4 mg total) under the tongue every 5 (five) minutes as needed for Chest pain.    spironolactone (ALDACTONE) 25 MG tablet Take 0.5 tablets (12.5 mg total) by mouth once daily.          While in the hospital, will manage blood pressure as follows; Continue home antihypertensive regimen    Will utilize p.r.n. blood pressure medication only if patient's blood pressure greater than  180/110 and she develops symptoms such as worsening chest pain or shortness of breath.

## 2023-02-28 NOTE — PLAN OF CARE
NURSING HOME ORDERS    02/28/2023  [facility]  [address]  [address]  [facility] [address]  Dept: [phone]  Loc: [phone]     Admit to Nursing Home:  SNF bed    Diagnoses:  Active Hospital Problems    Diagnosis  POA    *Colocutaneous fistula [K63.2]  Yes    Goals of care, counseling/discussion [Z71.89]  Not Applicable    Gross hematuria [R31.0]  No    Ileus [K56.7]  No    Severe protein-calorie malnutrition [E43]  Yes    Hyponatremia [E87.1]  Yes    Hypertension [I10]  Yes     2012: Diagnosed.      Hypercholesterolemia [E78.00]  Yes    Coronary artery disease [I25.10]  Yes     2012: [place]: Anterior MI. LAD stent.  1/21/2022: NSTEMI. Troponin 9.  1/24/2022: [facility]: Cath: LAD: Proximal stent patent. LCX: Proximal 80%. LCX: Dominant. Moderate disease. LCX: HEVER 2.75 x 18 mm. Distal dissection. HEVER 2.75 x 22 mm.        Ischemic cardiomyopathy [I25.5]  Yes     1/24/2022: Echo: Severely enlarged left ventricle with severe systolic dysfunction. EF 20%.         Automatic implantable cardioverter-defibrillator in situ [Z95.810]  Yes     2014: [employer] ICD.           Resolved Hospital Problems    Diagnosis Date Resolved POA    Acute cystitis with hematuria [N30.01] 02/17/2023 Unknown    Preop examination [Z01.818] 02/06/2023 Not Applicable    Hypotension [I95.9] 02/06/2023 Yes       Patient is homebound due to:  Colocutaneous fistula    Allergies:  Review of patient's allergies indicates:   Allergen Reactions    Augmentin [amoxicillin-pot clavulanate] Swelling     Not allergic to amoxicillin just a derivative of augmentin    Lisinopril Other (See Comments)     Fluid around heart    Shellfish containing products Anaphylaxis     Pt.states she is allergic to SEAFOOD since the age of 13    Levaquin [levofloxacin] Other (See Comments)     Very bad joint pain    Clindamycin Palpitations     Chest pain       Vitals:  Every shift    Diet: regular diet    Activities:   Up in a  chair each morning as tolerated, Ambulate with assistance to bathroom, and Activity as tolerated    Goals of Care Treatment Preferences:  Code Status: Full Code        What is most important right now is to focus on spending time at home, avoiding the hospital, remaining as independent as possible, symptom/pain control, improvement in condition but with limits to invasive therapies.  Accordingly, we have decided that the best plan to meet the patient's goals includes  discussions and palliative care consult. Patient is also interested in completing a living will and POA paperwork..      Labs:  weekly CBC, BMP, prealb    Nursing Precautions:  Pressure ulcer prevention    Consults:   PT to evaluate and treat- 3 times a week, OT to evaluate and treat- 3 times a week, Wound Care, and Nutrition to evaluate and recommend diet     Miscellaneous Care: Colostomy Care:  Empty bag every shift and prn  CHF Care: Daily Weight with notification of MD/NP of 2lb or > increase in 24 hours    v/s and O2 sat every shift    Oxygen as needed for sats <90%    Report abnormal breath sounds to MD/NP    Edema checks q shift- notify MD/NP of increased edema    Task segmentation by nursing for daily care to decrease exertion    CHF education to include diet ,medication, and CHF flags for MD notification                   Wound care:  Pack abd wound with iodofoam bid    Medications: Discontinue all previous medication orders, if any. See new list below.     Medication List        START taking these medications      moxifloxacin 400 mg tablet  Commonly known as: AVELOX  Take 1 tablet (400 mg total) by mouth once daily for 14 days            ASK your doctor about these medications      acetaminophen 500 MG tablet  Commonly known as: TYLENOL  Take 2 tablets (1,000 mg total) by mouth every 8 (eight) hours as needed for Pain.     albuterol 90 mcg/actuation inhaler  Commonly known as: PROVENTIL/VENTOLIN HFA  Inhale 2 puffs into the lungs  every 6 (six) hours as needed for Wheezing. Rescue     ALPRAZolam 0.5 MG tablet  Commonly known as: XANAX  Take 1 tablet (0.5 mg total) by mouth nightly as needed for Anxiety.     aspirin 81 MG EC tablet  Commonly known as: ECOTRIN  Take 1 tablet (81 mg total) by mouth once daily.     clopidogreL 75 mg tablet  Commonly known as: PLAVIX  Take 1 tablet (75 mg total) by mouth once daily.     docusate sodium 100 MG capsule  Commonly known as: COLACE  Take 1 capsule (100 mg total) by mouth 2 (two) times daily.     empagliflozin 10 mg tablet  Commonly known as: JARDIANCE  Take 1 tablet (10 mg total) by mouth once daily.     ENSURE HIGH PROTEIN Liqd  Generic drug: food supplemt, lactose-reduced  Take 240 mLs by mouth 2 (two) times a day.     ezetimibe 10 mg tablet  Commonly known as: ZETIA  Take 1 tablet (10 mg total) by mouth once daily.     famotidine 20 MG tablet  Commonly known as: PEPCID  Take 1 tablet (20 mg total) by mouth once daily.     fluticasone propionate 50 mcg/actuation nasal spray  Commonly known as: FLONASE  1 spray (50 mcg total) by Each Nostril route once daily.     furosemide 40 MG tablet  Commonly known as: LASIX  Take 1 tablet (40 mg total) by mouth once daily.     HYDROcodone-acetaminophen 5-325 mg per tablet  Commonly known as: NORCO  Take 1 tablet by mouth every 8 (eight) hours as needed for Pain.     losartan 25 MG tablet  Commonly known as: COZAAR  Take 1 tablet (25 mg total) by mouth once daily.     metoprolol succinate 25 MG 24 hr tablet  Commonly known as: TOPROL-XL  Take 1 tablet (25 mg total) by mouth once daily.     nitroGLYCERIN 0.4 MG SL tablet  Commonly known as: NITROSTAT  Place 1 tablet (0.4 mg total) under the tongue every 5 (five) minutes as needed for Chest pain.     ondansetron 8 MG Tbdl  Commonly known as: ZOFRAN-ODT  Take 1 tablet (8 mg total) by mouth every 12 (twelve) hours as needed (nausea).     simethicone 80 MG chewable tablet  Commonly known as: MYLICON  Take 1 tablet (80  mg total) by mouth every 8 (eight) hours as needed for Flatulence.     spironolactone 25 MG tablet  Commonly known as: ALDACTONE  Take 0.5 tablets (12.5 mg total) by mouth once daily.                Immunizations Administered as of 2/28/2023       Name Date Dose VIS Date Route Exp Date    COVID-19, MRNA, LN-S, FABIANO (Moderna) 8/10/2021 -- -- Intramuscular --    Site: Left arm     Lot: [id number]                    _________________________________  Carola Buckley NP  02/28/2023

## 2023-02-28 NOTE — ASSESSMENT & PLAN NOTE
"Advance Care Planning     [facility]  I engaged the patient in a conversation about advance care planning and we specifically addressed what the goals of care would be moving forward, in light of the patient's chronic illness and prolonged hospitalization.  We did not specifically address the patient's likely prognosis, which is fair .  We explored the patient's values and preferences for future care.  The patient endorses that what is most important right now is to focus on spending time at home, avoiding the hospital, remaining as independent as possible, symptom/pain control and improvement in condition but with limits to invasive therapies.    Accordingly, we have decided that the best plan to meet the patient's goals includes  discussions and palliative care consult. Patient is also interested in completing a living will and POA paperwork.    I spent a total of 30 minutes engaging the patient in this advance care planning discussion.      Serious Illness Conversation Guide:  Conversation was held with:   patient[AN1.1]     What is your understanding now of where you are with your illness?:   Properly estimates survival[AN1.2]     How much information about what is likely to be ahead with your illness would you like from me?:   wants to be fully informed[AN1.1]     I want to share with you my understanding of where things are with your illness.:   not discussed[AN1.3]     Patient emotions observed or reported:   bargaining[AN1.2]     What are your most important goals if your health situation worsens?:   being mentally aware, being at home, being comfortable[AN1.3]  Comments: "I want antibiotics and I just want to go home and if I die, I die."[AN1.3]     What are your biggest fears and worries about the future with your health?:   physical suffering, being a burden[AN1.3]     What gives you strength as you think about the future of your illness?:   Adventism amna[AN1.3]     What abilities are so critical to " "your life that you can't imagine living without them? Or, what makes life meaningful?:   being conscious of surroundings, communicating with others, being able to care for oneself, living independently[AN1.3]     If you become sicker, how much are you willing to go through for the possibility of gaining more time?:   being in the ICU, enduring physical discomfort, having artificial nutrition, being in the hospital[AN1.3]  Comments: "no nursing home, no ventilator"[AN1.3]     How much does your family know about your priorities and wishes?:   patient has had some incomplete discussions with family, patient wants clinician to discuss with family[AN1.3]  Comments: Patient would like to see palliative.[AN1.3]     I recommend that we do the following to make sure your treatment plans reflect what's important to you. How does this plan seem to you?:   additional conversation with physician, conversation with family, advance directive, referral to pastoral care, referral to palliative care    Of note, patient would now like palliative care consultation and the  to see her. She feels she needs more support from family and at this point is only able to rely on her amna for support and strength. She would like her son, Abelardo, to be her first contact and surrogate decision maker. She would like to complete power of  paperwork and list Abelardo as her power of . She would also like to complete a living will. In addition, she would like for a physician or healthcare provider to help her discuss her goals of care more in depth with her family, especially her son, Abelardo, and his wife who is a nurse.        "

## 2023-02-28 NOTE — ASSESSMENT & PLAN NOTE
"Advance Care Planning     [facility]  I engaged the patient in a conversation about advance care planning and we specifically addressed what the goals of care would be moving forward, in light of the patient's chronic illness and prolonged hospitalization.  We did not specifically address the patient's likely prognosis, which is fair .  We explored the patient's values and preferences for future care.  The patient endorses that what is most important right now is to focus on spending time at home, avoiding the hospital, remaining as independent as possible, symptom/pain control and improvement in condition but with limits to invasive therapies.    Accordingly, we have decided that the best plan to meet the patient's goals includes  discussions and palliative care consult. Patient is also interested in completing a living will and POA paperwork.    I spent a total of 30 minutes engaging the patient in this advance care planning discussion.      Serious Illness Conversation Guide:  Conversation was held with:   patient[AN1.1]     What is your understanding now of where you are with your illness?:   Properly estimates survival[AN1.2]     How much information about what is likely to be ahead with your illness would you like from me?:   wants to be fully informed[AN1.1]     I want to share with you my understanding of where things are with your illness.:   not discussed[AN1.3]     Patient emotions observed or reported:   bargaining[AN1.2]     What are your most important goals if your health situation worsens?:   being mentally aware, being at home, being comfortable[AN1.3]  Comments: "I want antibiotics and I just want to go home and if I die, I die."[AN1.3]     What are your biggest fears and worries about the future with your health?:   physical suffering, being a burden[AN1.3]     What gives you strength as you think about the future of your illness?:   Denominational amna[AN1.3]     What abilities are so critical to " "your life that you can't imagine living without them? Or, what makes life meaningful?:   being conscious of surroundings, communicating with others, being able to care for oneself, living independently[AN1.3]     If you become sicker, how much are you willing to go through for the possibility of gaining more time?:   being in the ICU, enduring physical discomfort, having artificial nutrition, being in the hospital[AN1.3]  Comments: "no nursing home, no ventilator"[AN1.3]     How much does your family know about your priorities and wishes?:   patient has had some incomplete discussions with family, patient wants clinician to discuss with family[AN1.3]  Comments: Patient would like to see palliative.[AN1.3]     I recommend that we do the following to make sure your treatment plans reflect what's important to you. How does this plan seem to you?:   additional conversation with physician, conversation with family, advance directive, referral to pastoral care, referral to palliative care    Of note, patient would like the  to see her. She feels she needs more support from family and at this point is only able to rely on her amna for support and strength. She would like her son, Abelardo, to be her first contact and surrogate decision maker. She would like to complete power of  paperwork and list Abelardo as her power of . She would also like to complete a living will. In addition, she would like for a physician or healthcare provider to help her discuss her goals of care more in depth with her family, especially her son, Abelardo, and his wife who is a nurse.    [facility] care consulted 2/28, patient requested cancellation of the consult because she is not ready to talk about "depressing things" at this time.             "

## 2023-02-28 NOTE — PLAN OF CARE
Palliative Provider and this  completed healthcare power of  form with patient. Patient designated her son Abelardo Ortiz [phone] as first contact and son Telly Spain [phone] as second contact.     Wesley Hernandez, [facility]  Palliative Medicine

## 2023-02-28 NOTE — PLAN OF CARE
"Pt in bed awake alert and oriented.  Pt verbalized sadness and tearfulness because she said "no one will come to see me."  She said her sons will not come.  Nurse sat w/ Pt and talked about her discharge plans.  She said she wanted to get some help while she is in her home  "

## 2023-02-28 NOTE — SUBJECTIVE & OBJECTIVE
Interval History: Met Ms. Hart in her hospital room with palliative  JAIRO Hernandez. Completed HCPOA form.    Past Medical History:   Diagnosis Date    Acute coronary syndrome     Acute exacerbation of chronic obstructive pulmonary disease (COPD) 3/23/2022    Allergy     Arthritis     Cardiomyopathy     CHF (congestive heart failure)     Coronary artery disease     Coronary artery disease of native artery of native heart with stable angina pectoris 2021: [facility]: Cath: LAD: Proximal stent patent. LCX: Proximal 80%. LCX: Dominant. Moderate disease. LCX: HEVER 2.75 x 18 mm. Distal dissection. HEVER 2.75 x 22 mm.     Diverticulitis     Diverticulosis     Familial hypercholesterolemia 2022    Former smoker 2022    Heart attack     Heart disease     History of myocardial infarction 2022    Hyperlipidemia     Hypertension     Hypertension 2022    ICD (implantable cardioverter-defibrillator) in place     Non-ST elevation myocardial infarction (NSTEMI) 2019       Past Surgical History:   Procedure Laterality Date    APPENDECTOMY N/A 2022    Procedure: APPENDECTOMY;  Surgeon: Rafa Cardozo MD;  Location: [facility] OR 2ND FLR;  Service: Colon and Rectal;  Laterality: N/A;    ATRIAL CARDIAC PACEMAKER INSERTION      CECECTOMY N/A 2022    Procedure: EXCISION, CECUM;  Surgeon: Rafa Cardozo MD;  [facility];  Service: Colon and Rectal;  Laterality: N/A;     SECTION      CHOLECYSTECTOMY N/A 2022    Procedure: CHOLECYSTECTOMY;  Surgeon: oDug Epstein MD;  [facility];  Service: General;  Laterality: N/A;    COLONOSCOPY N/A 2022    Procedure: COLONOSCOPY;  Surgeon: Rafa Cardozo MD;  Location: [facility] OR 2ND FLR;  Service: Colon and Rectal;  Laterality: N/A;    COLOSTOMY  2022    Procedure: CREATION, COLOSTOMY;  Surgeon: Rafa Cardozo MD;  [facility];  Service: Colon and Rectal;;    CORONARY STENT  PLACEMENT      CYSTOSCOPY W/ URETERAL STENT PLACEMENT Right 2/6/2023    Procedure: CYSTOSCOPY, WITH URETERAL STENT INSERTION;  Surgeon: Felice Laureano MD;  [facility];  Service: Urology;  Laterality: Right;  6X24 stent    DEBRIDEMENT, ABDOMEN N/A 2/6/2023    Procedure: DEBRIDEMENT, ABDOMEN;  Surgeon: Lucio Sanchez DO;  Location: [facility] OR [facility] FLR;  Service: Plastics;  Laterality: N/A;  abdominal wall, and fascia.    INSERTION OF URETERAL CATHETER Left 2/6/2023    Procedure: INSERTION, CATHETER, URETER, BILATERAL;  Surgeon: Felice Laureano MD;  [facility];  Service: Urology;  Laterality: Left;    LEFT HEART CATHETERIZATION Left 1/24/2022    Procedure: CATHETERIZATION, HEART, LEFT;  Surgeon: Yohannes Cordova MD;  Location: [facility] CATH LAB;  Service: Cardiology;  Laterality: Left;    LYSIS OF ADHESIONS N/A 2/6/2023    Procedure: LYSIS, ADHESIONS;  Surgeon: Rafa Cardozo MD;  [facility];  Service: Colon and Rectal;  Laterality: N/A;    REVISION COLOSTOMY N/A 2/6/2023    Procedure: REVISION, COLOSTOMY;  Surgeon: Rafa Cardozo MD;  [facility];  Service: Colon and Rectal;  Laterality: N/A;    SUBTOTAL COLECTOMY  2/6/2023    Procedure: COLECTOMY, PARTIAL;  Surgeon: Rafa Cardozo MD;  [facility];  Service: Colon and Rectal;;       Review of patient's allergies indicates:   Allergen Reactions    Augmentin [amoxicillin-pot clavulanate] Swelling     Not allergic to amoxicillin just a derivative of augmentin    Lisinopril Other (See Comments)     Fluid around heart    Shellfish containing products Anaphylaxis     Pt.states she is allergic to SEAFOOD since the age of 13    Levaquin [levofloxacin] Other (See Comments)     Very bad joint pain    Clindamycin Palpitations     Chest pain       Medications:  Continuous Infusions:  Scheduled Meds:   aspirin  81 mg Oral Daily    balsam peru-castor oiL   Topical (Top) BID    clopidogreL   75 mg Oral Daily    ezetimibe  10 mg Oral Daily    famotidine  20 mg Oral BID    furosemide  20 mg Oral Daily    metoprolol succinate  25 mg Oral Daily    moxifloxacin  400 mg Oral Daily    nystatin  500,000 Units Oral QID    polyethylene glycol  17 g Oral Daily     PRN Meds:[COMPLETED] acetaminophen **FOLLOWED BY** acetaminophen, albuterol, ALPRAZolam, dextrose 10%, dextrose 10%, fluticasone propionate, naloxone, nitroGLYCERIN, ondansetron, oxyCODONE, oxyCODONE, prochlorperazine, simethicone, sodium chloride 0.9%, sodium chloride 0.9%    Family History       Problem Relation (Age of Onset)    Emphysema Father    Heart attack Brother    Heart disease Mother          Tobacco Use    Smoking status: Former     Packs/day: 0.50     Types: Cigarettes     Start date: 2/4/1976     Quit date: 12/4/2012     Years since quitting: 10.2    Smokeless tobacco: Never   Substance and Sexual Activity    Alcohol use: No    Drug use: No    Sexual activity: Not on file       Review of Systems   Constitutional:  Positive for activity change.   HENT:  Negative for trouble swallowing.    Eyes:  Negative for pain.   Respiratory:  Negative for shortness of breath.    Cardiovascular:  Negative for chest pain.   Gastrointestinal:  Positive for abdominal pain.   Endocrine: Negative for polyuria.   Genitourinary:  Positive for hematuria.   Musculoskeletal:  Positive for gait problem.   Skin:  Positive for wound.   Neurological:  Positive for weakness.   Hematological:  Bruises/bleeds easily.   Psychiatric/Behavioral:  Positive for dysphoric mood.    Objective:     Vital Signs (Most Recent):  Temp: 97.3 °F (36.3 °C) (02/28/23 0817)  Pulse: 72 (02/28/23 1003)  Resp: 18 (02/28/23 1003)  BP: (!) 100/56 (02/28/23 0817)  SpO2: 95 % (02/28/23 0817)   Vital Signs (24h Range):  Temp:  [97.3 °F (36.3 °C)-98.5 °F (36.9 °C)] 97.3 °F (36.3 °C)  Pulse:  [70-79] 72  Resp:  [16-18] 18  SpO2:  [95 %-99 %] 95 %  BP: ()/(47-56) 100/56     Weight: 51.3 kg  (113 lb)  Body mass index is 20.02 kg/m².    Physical Exam  Constitutional:       General: She is not in acute distress.     Appearance: She is ill-appearing.   HENT:      Head: Normocephalic and atraumatic.      Right Ear: External ear normal.      Left Ear: External ear normal.      Nose: Nose normal.      Mouth/Throat:      Mouth: Mucous membranes are moist.   Eyes:      Extraocular Movements: Extraocular movements intact.      Conjunctiva/sclera: Conjunctivae normal.   Cardiovascular:      Rate and Rhythm: Normal rate.   Pulmonary:      Effort: Pulmonary effort is normal.      Breath sounds: Normal breath sounds.   Abdominal:      General: There is no distension.      Tenderness: There is abdominal tenderness.   Musculoskeletal:         General: Swelling present.   Lymphadenopathy:      Cervical: No cervical adenopathy.   Skin:     General: Skin is dry.      Coloration: Skin is pale.      Findings: Lesion present.   Neurological:      Mental Status: She is alert and oriented to person, place, and time. Mental status is at baseline.       Review of Symptoms      Symptom Assessment (ESAS 0-10 Scale)  Pain:  0  Dyspnea:  0  Anxiety:  0  Nausea:  0  Depression:  0  Anorexia:  0  Fatigue:  0  Insomnia:  0  Restlessness:  0  Agitation:  0         Living Arrangements:  Lives alone    Psychosocial/Cultural:   See Palliative Psychosocial Note: No   as of 2011. Has two sons, Telly lives locally and Abelardo lives in [place].  **Primary  to Follow**  Palliative Care  Consult: No      Advance Care Planning   Advance Directives:   Living Will: No    LaPOST: No    Do Not Resuscitate Status: No    Medical Power of : Yes    Agent's Name:  Abelardo Ortiz   Agent's Contact Number:  [phone]    Decision Making:  Patient answered questions  Goals of Care: The patient endorses that what is most important right now is to focus on remaining as independent as possible and extending life as long as  possible, even it it means sacrificing quality    Accordingly, we have decided that the best plan to meet the patient's goals includes continuing with treatment         Significant Labs: CBC:   Recent Labs   Lab 02/27/23 0307   WBC 4.68   HGB 9.2*   HCT 30.7*        CMP:   Recent Labs   Lab 02/27/23 0307 02/28/23  0405   * 131*   K 3.6 4.0   CL 91* 94*   CO2 30* 30*   * 95   BUN 19 18   CREATININE 0.7 0.7   CALCIUM 8.5* 8.6*   ANIONGAP 7* 7*     CBC:   Recent Labs   Lab 02/27/23 0307   WBC 4.68   HGB 9.2*   HCT 30.7*   MCV 80*        BMP:  Recent Labs   Lab 02/28/23  0405   GLU 95   *   K 4.0   CL 94*   CO2 30*   BUN 18   CREATININE 0.7   CALCIUM 8.6*   MG 1.8     LFT:  Lab Results   Component Value Date    AST 36 02/06/2023    ALKPHOS 116 02/06/2023    BILITOT 1.4 (H) 02/06/2023     Albumin:   Albumin   Date Value Ref Range Status   02/06/2023 1.7 (L) 3.5 - 5.2 g/dL Final     Protein:   Total Protein   Date Value Ref Range Status   02/06/2023 7.2 6.0 - 8.4 g/dL Final     Lactic acid:   Lab Results   Component Value Date    LACTATE 1.4 08/18/2022    LACTATE 2.3 (H) 08/18/2022       Significant Imaging: I have reviewed all pertinent imaging results/findings within the past 24 hours.

## 2023-02-28 NOTE — HPI
Odette Hart is a 67-year-old woman with a history of CAD s/p PCI to LAD (2012) and LCx (1/2022), ischemic cardiomyopathy with reduced EF (10%), open cholecystectomy and takedown of colovesical and colovaginal fistuli (two separate areas) with sigmoid colectomy, partial cecectomy and appendectomy, drainage of intra-abdominal abscess and end colostomy 5/9/2022 recently admitted 12/22/22 - 12/30/22 for new colocutaneous fistula admitted 1/32/23 with incarcerated colonic prolapse from her fistula s/p manual reduction now s/p open transverse/descending colectomy with takedown of colocutaneous fistula and end colostomy + parastomal hernia repair + lysis of adhesions + debridement of skin/subcutaneos fat/fascia/muscle (Daniel) 2/7/23, postoperative course complicated by initial ileus since resolved, midline wound hematoma s/p bedside evacuation with cotton-tipped applicator and subsequent iodoform packing, severe protein calorie malnutrition, severe deconditioning and hematuria likely from JJ ureteral stent in setting of ASA/Plavix/LVX for whom Palliative and Supportive Care was consulted to explore goals of care.

## 2023-02-28 NOTE — SUBJECTIVE & OBJECTIVE
Interval History: NAEON. Na uptrending to 131, remains asymptomatic. Receiving  PO lasix w/ adequate UOP. Ostomy functional. Discussed goals of care and completed the Serious Illness Conversation Guide with Ms. Hart. She is interested in meeting palliative care and further goals of care discussions. She also indicated that she would like her son, Abelardo (who lives in [place]) to be her POA and her first contact. She is willing to complete and POA form and living will.    Review of Systems   Constitutional:  Negative for chills and fever.   HENT:  Negative for sinus pain.    Eyes:  Negative for pain and visual disturbance.   Respiratory:  Negative for cough and shortness of breath.    Cardiovascular:  Negative for chest pain, palpitations and leg swelling.   Gastrointestinal:  Positive for abdominal pain. Negative for blood in stool, diarrhea and vomiting.   Genitourinary:  Positive for hematuria. Negative for difficulty urinating and dysuria.   Neurological:  Positive for weakness and light-headedness. Negative for dizziness.   Objective:     Vital Signs (Most Recent):  Temp: 97.3 °F (36.3 °C) (02/28/23 0817)  Pulse: 72 (02/28/23 1003)  Resp: 18 (02/28/23 1003)  BP: (!) 100/56 (02/28/23 0817)  SpO2: 95 % (02/28/23 0817)   Vital Signs (24h Range):  Temp:  [97.3 °F (36.3 °C)-98.5 °F (36.9 °C)] 97.3 °F (36.3 °C)  Pulse:  [70-79] 72  Resp:  [16-18] 18  SpO2:  [95 %-99 %] 95 %  BP: ()/(44-56) 100/56     Weight: 51.3 kg (113 lb)  Body mass index is 20.02 kg/m².    Intake/Output Summary (Last 24 hours) at 2/28/2023 1103  Last data filed at 2/28/2023 0348  Gross per 24 hour   Intake 880 ml   Output 880 ml   Net 0 ml        Physical Exam  Vitals and nursing note reviewed.   Constitutional:       General: She is not in acute distress.     Appearance: She is ill-appearing.   HENT:      Head: Normocephalic and atraumatic.      Right Ear: External ear normal.      Left Ear: External ear normal.      Nose: No congestion  or rhinorrhea.   Eyes:      General:         Right eye: No discharge.         Left eye: No discharge.      Extraocular Movements: Extraocular movements intact.      Pupils: Pupils are equal, round, and reactive to light.   Cardiovascular:      Rate and Rhythm: Normal rate and regular rhythm.      Heart sounds: Normal heart sounds. No murmur heard.  Pulmonary:      Effort: No respiratory distress.      Breath sounds: No wheezing or rales.   Abdominal:      General: There is no distension.      Comments: Colostomy, vertical/midline abdominal excision, bandage clean/dry   Musculoskeletal:         General: No tenderness.      Cervical back: Normal range of motion. No rigidity.      Right lower leg: No edema.      Left lower leg: No edema.   Skin:     Findings: Bruising present. No erythema.   Neurological:      General: No focal deficit present.      Mental Status: She is alert and oriented to person, place, and time.   Psychiatric:         Mood and Affect: Mood normal.         Behavior: Behavior normal.       Significant Labs: All pertinent labs within the past 24 hours have been reviewed.  CBC:   Recent Labs   Lab 02/27/23 0307   WBC 4.68   HGB 9.2*   HCT 30.7*        CMP:   Recent Labs   Lab 02/27/23 0307 02/28/23  0405   * 131*   K 3.6 4.0   CL 91* 94*   CO2 30* 30*   * 95   BUN 19 18   CREATININE 0.7 0.7   CALCIUM 8.5* 8.6*   ANIONGAP 7* 7*     Magnesium:   Recent Labs   Lab 02/27/23 0307 02/28/23  0405   MG 1.8 1.8         Significant Imaging: I have reviewed all pertinent imaging results/findings within the past 24 hours.

## 2023-02-28 NOTE — PROGRESS NOTES
[facility]  Colorectal Surgery  Progress Note    Patient Name: Odette Hart  MRN: [id number]  Admission Date: 1/31/2023  Hospital Length of Stay: 28 days  Attending Physician: Rafa Cardozo MD    Subjective:     Interval History: no acute events overnite, appetite poor, working with PT/OT, colostomy functional, no n/v    Post-Op Info:  Procedure(s) (LRB):  REVISION, COLOSTOMY (N/A)  INSERTION, CATHETER, URETER, BILATERAL (Left)  CYSTOSCOPY, WITH URETERAL STENT INSERTION (Right)  DEBRIDEMENT, ABDOMEN (N/A)  COLECTOMY, PARTIAL  LYSIS, ADHESIONS (N/A)   22 Days Post-Op      Medications:  Continuous Infusions:  Scheduled Meds:   aspirin  81 mg Oral Daily    balsam peru-castor oiL   Topical (Top) BID    clopidogreL  75 mg Oral Daily    ezetimibe  10 mg Oral Daily    famotidine  20 mg Oral BID    furosemide  20 mg Oral Daily    metoprolol succinate  25 mg Oral Daily    moxifloxacin  400 mg Oral Daily    nystatin  500,000 Units Oral QID    polyethylene glycol  17 g Oral Daily     PRN Meds:   acetaminophen    albuterol    ALPRAZolam    dextrose 10%    dextrose 10%    fluticasone propionate    naloxone    nitroGLYCERIN    ondansetron    oxyCODONE    oxyCODONE    prochlorperazine    simethicone    sodium chloride 0.9%    sodium chloride 0.9%        Objective:     Vital Signs (Most Recent):  Temp: 97.3 °F (36.3 °C) (02/28/23 0817)  Pulse: 72 (02/28/23 1003)  Resp: 18 (02/28/23 1003)  BP: (!) 100/56 (02/28/23 0817)  SpO2: 95 % (02/28/23 0817)   Vital Signs (24h Range):  Temp:  [97.3 °F (36.3 °C)-98.5 °F (36.9 °C)] 97.3 °F (36.3 °C)  Pulse:  [70-79] 72  Resp:  [16-18] 18  SpO2:  [95 %-99 %] 95 %  BP: ()/(47-56) 100/56     Intake/Output - Last 3 Shifts         02/26 0700  02/27 0659 02/27 0700 02/28 0659 02/28 0700  03/01 0659    P.O. 590 880     Total Intake(mL/kg) 590 (11.5) 880 (17.2)     Urine (mL/kg/hr) 1400 (1.1) 800 (0.6)     Stool 75 140     Total Output 1475 940     Net  -885 -60            Urine Occurrence  1 x     Stool Occurrence 0 x              Physical Exam  Vitals and nursing note reviewed.   Constitutional:       Appearance: She is well-developed.   HENT:      Head: Normocephalic.   Eyes:      Pupils: Pupils are equal, round, and reactive to light.   Cardiovascular:      Rate and Rhythm: Normal rate and regular rhythm.      Heart sounds: Normal heart sounds.   Pulmonary:      Effort: Pulmonary effort is normal. No respiratory distress.      Breath sounds: Normal breath sounds. No wheezing or rales.   Abdominal:      Palpations: Abdomen is soft. There is no mass.      Tenderness: There is no guarding or rebound.      Comments: Abd wound packing in place, erythema improved   Skin:     General: Skin is warm and dry.   Neurological:      Mental Status: She is alert and oriented to person, place, and time.           Significant Labs:  BMP (Last 3 Results):   Recent Labs   Lab 02/26/23  0628 02/27/23  0307 02/28/23  0405   * 123* 95   * 128* 131*   K 4.7 3.6 4.0   CL 92* 91* 94*   CO2 24 30* 30*   BUN 17 19 18   CREATININE 0.7 0.7 0.7   CALCIUM 8.6* 8.5* 8.6*   MG 2.0 1.8 1.8     CBC (Last 3 Results):   Recent Labs   Lab 02/23/23  0636 02/24/23  0613 02/27/23  0307   WBC 2.59* 2.64* 4.68   RBC 3.99* 3.93* 3.86*   HGB 9.6* 9.3* 9.2*   HCT 31.1* 31.3* 30.7*    382 334   MCV 78* 80* 80*   MCH 24.1* 23.7* 23.8*   MCHC 30.9* 29.7* 30.0*       Significant Diagnostics:  None    Assessment/Plan:     * Colocutaneous fistula  OR 2/6/23 revision colostomy (see op note)    -await return of bowel function, colostomy funcitonal  -continue multi modality for pain control  -encourage ambulation and use of IS  -phani hose and SCD  -prophalactic lovenox and PPI  -cont local wound care    Ileus  Post op ileus not surgical complication but expected outcome  Ileus now resolved       Severe protein-calorie malnutrition  Nutrition consulted. Most recent weight and BMI monitored-                          Measurements:  Wt Readings from Last 1 Encounters:   02/26/23 51.3 kg (113 lb)   Body mass index is 20.02 kg/m².    Recommendations: Recommendation/Intervention: 1) Continue clear liquid   2) Continue Boost Plus & Boost Breeze  3) RD following  Goals: meets % een/epn by next rd f/u    Patient has been screened and assessed by RD. RD will follow patient.      Hyponatremia  Monitor labs  Appreciate medicine input    Hypertension  Chronic, controlled.  Latest blood pressure and vitals reviewed-   Temp:  [97.3 °F (36.3 °C)-98.5 °F (36.9 °C)]   Pulse:  [70-79]   Resp:  [16-18]   BP: ()/(47-56)   SpO2:  [95 %-99 %] .   Home meds for hypertension were reviewed and noted below.   Hypertension Medications             furosemide (LASIX) 40 MG tablet Take 1 tablet (40 mg total) by mouth once daily.    losartan (COZAAR) 25 MG tablet Take 1 tablet (25 mg total) by mouth once daily.    metoprolol succinate (TOPROL-XL) 25 MG 24 hr tablet Take 1 tablet (25 mg total) by mouth once daily.    nitroGLYCERIN (NITROSTAT) 0.4 MG SL tablet Place 1 tablet (0.4 mg total) under the tongue every 5 (five) minutes as needed for Chest pain.    spironolactone (ALDACTONE) 25 MG tablet Take 0.5 tablets (12.5 mg total) by mouth once daily.          While in the hospital, will manage blood pressure as follows; Continue home antihypertensive regimen    Will utilize p.r.n. blood pressure medication only if patient's blood pressure greater than  180/110 and she develops symptoms such as worsening chest pain or shortness of breath.    Coronary artery disease  Cont home meds  appreciate medicine input    Automatic implantable cardioverter-defibrillator in situ  Cont tele    Ischemic cardiomyopathy  Cont home meds  appeciate medicine assistance with multiple medical problems  Monitor vs  monitior labs          Carola Buckley NP  Colorectal Surgery  [facility]

## 2023-02-28 NOTE — CONSULTS
[facility]  Palliative Medicine  Consult Note    Patient Name: Odette Hart  MRN: [id number]  Admission Date: 1/31/2023  Hospital Length of Stay: 28 days  Code Status: Full Code   Attending Provider: Rafa Cardozo MD  Consulting Provider: Meghann Gottlieb MD  Primary Care Physician: Braxton Barragan MD  Principal Problem:Colocutaneous fistula    Patient information was obtained from patient and primary team.      Inpatient consult to Palliative Care  Consult performed by: Meghann Gottlieb MD  Consult ordered by: Carola Buckley NP  Reason for consult: goals of care        Assessment/Plan:     Palliative Care  Encounter for palliative care   Odette Hart is a 67-year-old woman with a history of CAD s/p PCI to LAD (2012) and LCx (1/2022), ischemic cardiomyopathy with reduced EF (10%), open cholecystectomy and takedown of colovesical and colovaginal fistuli (two separate areas) with sigmoid colectomy, partial cecectomy and appendectomy, drainage of intra-abdominal abscess and end colostomy 5/9/2022 recently admitted 12/22/22 - 12/30/22 for new colocutaneous fistula admitted 1/32/23 with incarcerated colonic prolapse from her fistula s/p manual reduction now s/p open transverse/descending colectomy with takedown of colocutaneous fistula and end colostomy + parastomal hernia repair + lysis of adhesions + debridement of skin/subcutaneos fat/fascia/muscle (Daniel) 2/7/23, postoperative course complicated by initial ileus since resolved, midline wound hematoma s/p bedside evacuation with cotton-tipped applicator and subsequent iodoform packing, severe protein calorie malnutrition, severe deconditioning and hematuria likely from JJ ureteral stent in setting of ASA/Plavix/LVX for whom Palliative and Supportive Care was consulted to explore goals of care.    Advance Care Planning   Goals of Care:  - Code status: Full code  - HCPOA: Abelardo Ortiz (son) [phone]   - Second HCPOA: jacques Ortiz [phone]   -  STANOA signed on 2-28-23, will upload to EMR  - Patient has decision making capacity  - Prognosis: poor  - Patient's understanding of prognosis: limited  - Goals: restore independence, life prolongation even at expense of quality of life  - Recommendations: continue current level of care  - There is a lot of hesitancy and deferment during our encounter. Though she has consistently identified Abelardo as her desired MPOA in prior encounters (since July 2022) she responds that she would have to talk to her son about it first, though these conversations were initiated since July 2022. She expresses preference for when we are allowed to talk to her, and notes the inconvenience of our visit during her lunch time (at 1PM) as well. These may be manifestations of her need for some sense of control in a situation and environment that has taken away a significant amount of her power and control. Her situation is further complicated by the lack of social support, as described in her initial palliative care consult note from July 2022 and again in August 2022. While she believes that her abdominal surgeries are what are affecting her health the most, she denies that her heart is the prominent issue. On discussion with the primary colorectal team, there is an expectation that she will heal from the successful abdominal interventions, but that truly her life-limiting diagnoses are her advanced heart failure and severe deconditioning. While her understanding of her health may be limited, I sense that there is a contributing factor of anticipatory grief and sadness of her loss of independence that is a barrier to her acceptance of the severity of her truly life-limiting illness, and not a delusional disorder. She has dismissed our services at this time. If she is open to exploring goals again in the future with additional counseling of the severity of her multimorbidity, please do not hesitate to reach back out to us.    Goals of Care  "Conversation:  - 2/28/2023: I met Odette Hart in her hospital room along with palliative care  JAIRO Hernandez. She asks us initially to step behind a curtain so that her RN can help her readjust herself in the recliner. When she is done, she allows us to come in, tells us without prompting that her colon surgery probably didn't go well. She expresses worry that she won't heal the way she did from her heart attack. She emphasizes to us that she is not hospitalized for her heart, that there is nothing wrong with her heart currently. She reports feeling down/depressed because during her entire month hospitalized, she has had only two visitors. She shares that she felt like she wanted to give up, but her surgeon told her, "No, don't give up! You have to keep going. We're going to get you into our rehab." I told her that I read through her medical chart and saw that she had identified her son Abelardo as her desired MPOA. I showed her the blank HCPOA form and offered to fill it out with her. She responded, "The nurse told me that I should tell my son that I'm going to make him my medical power of  before I sign it." Agreed with her. Of note, there are two notes from palliative care in [facility] with excellent documentation of HCPOA/ACP discussions and her consistently identifying Abelardo as her desired HCPOA. Discussed that without the HCPOA form, both sons Telly and Abelardo would be her surrogate decision maker and they would be required to work together to make medical decisions on her behalf if she is unable to do so. She asked what the point of filling out the HCPOA form was if they were her default surrogate decision maker. Shared that if she desired for one person over the other, or even someone else different, then this form would reflect her wishes and the doctors/nurses would abide by her preference. She eventually did sign the HCPOA form, expressing that she feels that Abelardo will be more likely to " "want to keep her alive and give her a chance. She tells me without prompting that she is not ready to "give up and die" and that she looks forward to getting stronger with rehab.            Thank you for your consult. I will sign off. Please contact us if you have any additional questions.    Subjective:     HPI:   Odette Hart is a 67-year-old woman with a history of CAD s/p PCI to LAD (2012) and LCx (1/2022), ischemic cardiomyopathy with reduced EF (10%), open cholecystectomy and takedown of colovesical and colovaginal fistuli (two separate areas) with sigmoid colectomy, partial cecectomy and appendectomy, drainage of intra-abdominal abscess and end colostomy 5/9/2022 recently admitted 12/22/22 - 12/30/22 for new colocutaneous fistula admitted 1/32/23 with incarcerated colonic prolapse from her fistula s/p manual reduction now s/p open transverse/descending colectomy with takedown of colocutaneous fistula and end colostomy + parastomal hernia repair + lysis of adhesions + debridement of skin/subcutaneos fat/fascia/muscle (Sanchez) 2/7/23, postoperative course complicated by initial ileus since resolved, midline wound hematoma s/p bedside evacuation with cotton-tipped applicator and subsequent iodoform packing, severe protein calorie malnutrition, severe deconditioning and hematuria likely from JJ ureteral stent in setting of ASA/Plavix/LVX for whom Palliative and Supportive Care was consulted to explore goals of care.      Hospital Course:  No notes on file    Interval History: Met Ms. Hart in her hospital room with palliative  JAIRO Hernandez. Completed HCPOA form.    Past Medical History:   Diagnosis Date    Acute coronary syndrome     Acute exacerbation of chronic obstructive pulmonary disease (COPD) 3/23/2022    Allergy     Arthritis     Cardiomyopathy     CHF (congestive heart failure)     Coronary artery disease     Coronary artery disease of native artery of native heart with stable " angina pectoris 2021: [facility]: Cath: LAD: Proximal stent patent. LCX: Proximal 80%. LCX: Dominant. Moderate disease. LCX: HEVER 2.75 x 18 mm. Distal dissection. HEVER 2.75 x 22 mm.     Diverticulitis     Diverticulosis     Familial hypercholesterolemia 2022    Former smoker 2022    Heart attack     Heart disease     History of myocardial infarction 2022    Hyperlipidemia     Hypertension     Hypertension 2022    ICD (implantable cardioverter-defibrillator) in place     Non-ST elevation myocardial infarction (NSTEMI) 2019       Past Surgical History:   Procedure Laterality Date    APPENDECTOMY N/A 2022    Procedure: APPENDECTOMY;  Surgeon: Rafa Cardozo MD;  Location: [facility] OR 2ND FLR;  Service: Colon and Rectal;  Laterality: N/A;    ATRIAL CARDIAC PACEMAKER INSERTION      CECECTOMY N/A 2022    Procedure: EXCISION, CECUM;  Surgeon: Rafa Cardozo MD;  Location: [facility] OR 2ND FLR;  Service: Colon and Rectal;  Laterality: N/A;     SECTION      CHOLECYSTECTOMY N/A 2022    Procedure: CHOLECYSTECTOMY;  Surgeon: Doug Epstein MD;  Location: [facility] OR 2ND FLR;  Service: General;  Laterality: N/A;    COLONOSCOPY N/A 2022    Procedure: COLONOSCOPY;  Surgeon: Rafa Cardozo MD;  Location: [facility]ND FLR;  Service: Colon and Rectal;  Laterality: N/A;    COLOSTOMY  2022    Procedure: CREATION, COLOSTOMY;  Surgeon: Rafa Cardozo MD;  Location: [facility] FLR;  Service: Colon and Rectal;;    CORONARY STENT PLACEMENT      CYSTOSCOPY W/ URETERAL STENT PLACEMENT Right 2023    Procedure: CYSTOSCOPY, WITH URETERAL STENT INSERTION;  Surgeon: Felice Laureano MD;  Location: [facility] OR 2ND FLR;  Service: Urology;  Laterality: Right;  6X24 stent    DEBRIDEMENT, ABDOMEN N/A 2023    Procedure: DEBRIDEMENT, ABDOMEN;  Surgeon: Lucio Sanchez DO;  Location: [facility] OR 2ND FLR;  Service: Plastics;  Laterality: N/A;  abdominal  wall, and fascia.    INSERTION OF URETERAL CATHETER Left 2/6/2023    Procedure: INSERTION, CATHETER, URETER, BILATERAL;  Surgeon: Felice Laureano MD;  [facility];  Service: Urology;  Laterality: Left;    LEFT HEART CATHETERIZATION Left 1/24/2022    Procedure: CATHETERIZATION, HEART, LEFT;  Surgeon: Yohannes Cordova MD;  Location: [facility] CATH LAB;  Service: Cardiology;  Laterality: Left;    LYSIS OF ADHESIONS N/A 2/6/2023    Procedure: LYSIS, ADHESIONS;  Surgeon: Rafa Cardozo MD;  [facility];  Service: Colon and Rectal;  Laterality: N/A;    REVISION COLOSTOMY N/A 2/6/2023    Procedure: REVISION, COLOSTOMY;  Surgeon: Rafa Cardozo MD;  [facility];  Service: Colon and Rectal;  Laterality: N/A;    SUBTOTAL COLECTOMY  2/6/2023    Procedure: COLECTOMY, PARTIAL;  Surgeon: Rafa Cardozo MD;  [facility];  Service: Colon and Rectal;;       Review of patient's allergies indicates:   Allergen Reactions    Augmentin [amoxicillin-pot clavulanate] Swelling     Not allergic to amoxicillin just a derivative of augmentin    Lisinopril Other (See Comments)     Fluid around heart    Shellfish containing products Anaphylaxis     Pt.states she is allergic to SEAFOOD since the age of 13    Levaquin [levofloxacin] Other (See Comments)     Very bad joint pain    Clindamycin Palpitations     Chest pain       Medications:  Continuous Infusions:  Scheduled Meds:   aspirin  81 mg Oral Daily    balsam peru-castor oiL   Topical (Top) BID    clopidogreL  75 mg Oral Daily    ezetimibe  10 mg Oral Daily    famotidine  20 mg Oral BID    furosemide  20 mg Oral Daily    metoprolol succinate  25 mg Oral Daily    moxifloxacin  400 mg Oral Daily    nystatin  500,000 Units Oral QID    polyethylene glycol  17 g Oral Daily     PRN Meds:[COMPLETED] acetaminophen **FOLLOWED BY** acetaminophen, albuterol, ALPRAZolam, dextrose 10%, dextrose 10%, fluticasone  propionate, naloxone, nitroGLYCERIN, ondansetron, oxyCODONE, oxyCODONE, prochlorperazine, simethicone, sodium chloride 0.9%, sodium chloride 0.9%    Family History       Problem Relation (Age of Onset)    Emphysema Father    Heart attack Brother    Heart disease Mother          Tobacco Use    Smoking status: Former     Packs/day: 0.50     Types: Cigarettes     Start date: 2/4/1976     Quit date: 12/4/2012     Years since quitting: 10.2    Smokeless tobacco: Never   Substance and Sexual Activity    Alcohol use: No    Drug use: No    Sexual activity: Not on file       Review of Systems   Constitutional:  Positive for activity change.   HENT:  Negative for trouble swallowing.    Eyes:  Negative for pain.   Respiratory:  Negative for shortness of breath.    Cardiovascular:  Negative for chest pain.   Gastrointestinal:  Positive for abdominal pain.   Endocrine: Negative for polyuria.   Genitourinary:  Positive for hematuria.   Musculoskeletal:  Positive for gait problem.   Skin:  Positive for wound.   Neurological:  Positive for weakness.   Hematological:  Bruises/bleeds easily.   Psychiatric/Behavioral:  Positive for dysphoric mood.    Objective:     Vital Signs (Most Recent):  Temp: 97.3 °F (36.3 °C) (02/28/23 0817)  Pulse: 72 (02/28/23 1003)  Resp: 18 (02/28/23 1003)  BP: (!) 100/56 (02/28/23 0817)  SpO2: 95 % (02/28/23 0817)   Vital Signs (24h Range):  Temp:  [97.3 °F (36.3 °C)-98.5 °F (36.9 °C)] 97.3 °F (36.3 °C)  Pulse:  [70-79] 72  Resp:  [16-18] 18  SpO2:  [95 %-99 %] 95 %  BP: ()/(47-56) 100/56     Weight: 51.3 kg (113 lb)  Body mass index is 20.02 kg/m².    Physical Exam  Constitutional:       General: She is not in acute distress.     Appearance: She is ill-appearing.   HENT:      Head: Normocephalic and atraumatic.      Right Ear: External ear normal.      Left Ear: External ear normal.      Nose: Nose normal.      Mouth/Throat:      Mouth: Mucous membranes are moist.   Eyes:      Extraocular  Movements: Extraocular movements intact.      Conjunctiva/sclera: Conjunctivae normal.   Cardiovascular:      Rate and Rhythm: Normal rate.   Pulmonary:      Effort: Pulmonary effort is normal.      Breath sounds: Normal breath sounds.   Abdominal:      General: There is no distension.      Tenderness: There is abdominal tenderness.   Musculoskeletal:         General: Swelling present.   Lymphadenopathy:      Cervical: No cervical adenopathy.   Skin:     General: Skin is dry.      Coloration: Skin is pale.      Findings: Lesion present.   Neurological:      Mental Status: She is alert and oriented to person, place, and time. Mental status is at baseline.       Review of Symptoms      Symptom Assessment (ESAS 0-10 Scale)  Pain:  0  Dyspnea:  0  Anxiety:  0  Nausea:  0  Depression:  0  Anorexia:  0  Fatigue:  0  Insomnia:  0  Restlessness:  0  Agitation:  0         Living Arrangements:  Lives alone    Psychosocial/Cultural:   See Palliative Psychosocial Note: No   as of 2011. Has two sons, Telly lives locally and Abelardo lives in [place].  **Primary  to Follow**  Palliative Care  Consult: No      Advance Care Planning   Advance Directives:   Living Will: No    LaPOST: No    Do Not Resuscitate Status: No    Medical Power of : Yes    Agent's Name:  Abelardo Ortiz   Agent's Contact Number:  [phone]    Decision Making:  Patient answered questions  Goals of Care: The patient endorses that what is most important right now is to focus on remaining as independent as possible and extending life as long as possible, even it it means sacrificing quality    Accordingly, we have decided that the best plan to meet the patient's goals includes continuing with treatment         Significant Labs: CBC:   Recent Labs   Lab 02/27/23  0307   WBC 4.68   HGB 9.2*   HCT 30.7*        CMP:   Recent Labs   Lab 02/27/23  0307 02/28/23  0405   * 131*   K 3.6 4.0   CL 91* 94*   CO2 30* 30*   GLU  123* 95   BUN 19 18   CREATININE 0.7 0.7   CALCIUM 8.5* 8.6*   ANIONGAP 7* 7*     CBC:   Recent Labs   Lab 02/27/23  0307   WBC 4.68   HGB 9.2*   HCT 30.7*   MCV 80*        BMP:  Recent Labs   Lab 02/28/23  0405   GLU 95   *   K 4.0   CL 94*   CO2 30*   BUN 18   CREATININE 0.7   CALCIUM 8.6*   MG 1.8     LFT:  Lab Results   Component Value Date    AST 36 02/06/2023    ALKPHOS 116 02/06/2023    BILITOT 1.4 (H) 02/06/2023     Albumin:   Albumin   Date Value Ref Range Status   02/06/2023 1.7 (L) 3.5 - 5.2 g/dL Final     Protein:   Total Protein   Date Value Ref Range Status   02/06/2023 7.2 6.0 - 8.4 g/dL Final     Lactic acid:   Lab Results   Component Value Date    LACTATE 1.4 08/18/2022    LACTATE 2.3 (H) 08/18/2022       Significant Imaging: I have reviewed all pertinent imaging results/findings within the past 24 hours.      I spent a total of 75 minutes on the day of the visit. This includes face to face time in discussion of goals of care, symptom assessment, coordination of care and emotional support. This also includes non-face to face time preparing to see the patient (eg, review of tests/imaging), obtaining and/or reviewing separately obtained history, documenting clinical information in the electronic or other health record, independently interpreting results and communicating results to the patient/family/caregiver, or care coordinator. A total of 16 minutes was spent on advance care planning, goals of care discussion, emotional support, formulating and communicating prognosis and goals of care, exploring burden/benefit of various approaches of treatment. This discussion occurred on a fully voluntary basis with the verbal consent of the patient and/or family.        Meghann Gottlieb MD  Palliative Medicine  [facility]

## 2023-02-28 NOTE — ASSESSMENT & PLAN NOTE
Chronic mild hyponatremia on chart review, now moderate on presentation  Found to be in ADHF on arrival  BNP 2600, volume overloaded, JVD present, bilateral LE edema on consultation  CXR with mild pulmonary congestion  Reports poor diet at home (crackers/peanutbutter)  Reduced albumin likely 2/2 to malnutrition    Previously due to ADHF. Worsening back to 125, asymptomatic. Likely due to ADHF again as patient with rhales on examination.     - Urine 543, urine Na 24  - IV lasix 40 mg given on 2/27 with good improvement. Transitioned to PO lasix 20mg daily, will continue.  - will trend weight and follow up repeat BNP  - strict I/Os  - daily CMP  - replace electrolytes

## 2023-02-28 NOTE — PROGRESS NOTES
[facility]  Progress Note    Patient Name: Odette Hart  MRN: [id number]  Patient Class: IP- Inpatient   Admission Date: 1/31/2023  Length of Stay: 28 days  Attending Physician: Rafa Cardozo MD  Primary Care Provider: Braxton Barragan MD        Subjective:     Principal Problem:Colocutaneous fistula        HPI:  66 yo F with HTN, HLD (statin intolerance), DM, CAD s/p PCI to LAD (2012)/LCX (1/2022), HFrEF (EF10%), debility, severe malnutrition, and the following procedures performed 5/2022: cholecystectomy, takedown of colovesical and colovaginal fistula, sigmoid colectomy, partial cecectomy, end colostomy who presents for colocutaneous fistula repair with abdominal wall reconstruction. On admission she was found to be hyponatremic and in acute on chronic decompensated heart failure. Cardiology was consulted to manage her HF and have since signed off as her volume status has become more euvolemic following diuresis. Medicine was consulted for pr-eop evaluation and continued management of her hyponatremia. Per chart review patient is chronically, mildly hyponatremic at baseline. She takes Lasix, Toprol, spironolactone, and losartan for her HFrEF. She reports correct use of her home medications. She states her diet consists of peanut butter and crackers. Denies CP/SOB/nausea vomiting, cough fever chills, polydipsia. Her Na has been improving with diuresis. Patient has multiple risk factors that complicate surgery: acute on chronic decompensated HF, CAD with PCIx2, hypertension, hyperlipidemia, malnutrition. This being said she does not have reported/documented/observed dysfunction of liver, kidney, lung, clotting.    On examination, she is chronically ill appearing/in no acute distress/at mental baseline. AF, /58 (78), sat 98% on RA, no leukocytosis, hgb 11.8, Hct 38.5, Plts 237, moderate Hyponatremia Na 124 (initially 122 on admission) with labile Baseline, K 3.1, Cl 85, Bun 13,  Cr 00.8, Ca 7.9, albumin 2-3. BNP 2600, UOP 2300 on lasix 80mg    CXR with mild pulmonary congestion      Overview/Hospital Course:  Admitted for planned colocutaneous fistula repair, found to be hyponatremic and in ADHF. Started on IV diuresis, >6 L UOP, returned to euvolemia. Urine/serum studies consistent with hypervolemic hyponatremia most likely in 2/2 to acute on chronic HF. She does have multiple factors that maker her high risk for surgery, primarily her severrely reduced LVEF 10%. Cardiology and medicine consulted for pre-operative evaluation. Agree she is high risk, but need of her procedure outweigh the risks. 2/6 Patient tolerated colocutaneous takedown/reconstruction well. Her Hyponatremia has resolved with Lasix 80mg BID. S/p colocutaneous fistula revision with intra-op R ureteral stent placement for hydronephrosis. Developed post-op ileus. Given lasix holiday for over diuresis. Plavix, ASA restarted post-op. Anticipate discharge on prior home PO lasix dosing.     Complaint of dysuria and dark cloudy urine noted by primary team, patient started on cipro then transitioned to cefpodoxime (broader coverage) given culture results of multiple organisms none in predominance (thus an unequivocal UCx).  Recently discontinued from PCA pump with PRN pain management of oxy 5 and 10 mg. Restarted lasix 20 mg on 2/14/23. CRS monitoring surgical site closely, some output of serous fluid, no signs of cellulitis. Continued symptomatic hypotension despite discontinuing GDMT. CTS and HTS both evaluated patient and deemed there are likely no advanced options given debility and age. Recommended palliative care consultation which was relayed to primary team.    Patient with new onset hematuria and reported dysuira. Call to lab concerning UA completed which demonstrated WBC clumps and no WBC was addend to show just hematuria. Urology was contacted given recent stent placement and plan to evaluate patient. Repeat CT ordered  by CRS which demonstrated likely fluid collection for which CRS plans to manage with continued PO Moxifloxacin. Will monitor for tolerance      Interval History: NAEON. Na uptrending to 131, remains asymptomatic. Receiving  PO lasix w/ adequate UOP. Ostomy functional. Discussed goals of care and completed the Serious Illness Conversation Guide with Ms. Hart. She is interested in meeting palliative care and further goals of care discussions. She also indicated that she would like her son, Abelardo (who lives in [place]) to be her POA and her first contact. She is willing to complete and POA form and living will.    Review of Systems   Constitutional:  Negative for chills and fever.   HENT:  Negative for sinus pain.    Eyes:  Negative for pain and visual disturbance.   Respiratory:  Negative for cough and shortness of breath.    Cardiovascular:  Negative for chest pain, palpitations and leg swelling.   Gastrointestinal:  Positive for abdominal pain. Negative for blood in stool, diarrhea and vomiting.   Genitourinary:  Positive for hematuria. Negative for difficulty urinating and dysuria.   Neurological:  Positive for weakness and light-headedness. Negative for dizziness.   Objective:     Vital Signs (Most Recent):  Temp: 97.3 °F (36.3 °C) (02/28/23 0817)  Pulse: 72 (02/28/23 1003)  Resp: 18 (02/28/23 1003)  BP: (!) 100/56 (02/28/23 0817)  SpO2: 95 % (02/28/23 0817)   Vital Signs (24h Range):  Temp:  [97.3 °F (36.3 °C)-98.5 °F (36.9 °C)] 97.3 °F (36.3 °C)  Pulse:  [70-79] 72  Resp:  [16-18] 18  SpO2:  [95 %-99 %] 95 %  BP: ()/(44-56) 100/56     Weight: 51.3 kg (113 lb)  Body mass index is 20.02 kg/m².    Intake/Output Summary (Last 24 hours) at 2/28/2023 1103  Last data filed at 2/28/2023 0348  Gross per 24 hour   Intake 880 ml   Output 880 ml   Net 0 ml        Physical Exam  Vitals and nursing note reviewed.   Constitutional:       General: She is not in acute distress.     Appearance: She is ill-appearing.    HENT:      Head: Normocephalic and atraumatic.      Right Ear: External ear normal.      Left Ear: External ear normal.      Nose: No congestion or rhinorrhea.   Eyes:      General:         Right eye: No discharge.         Left eye: No discharge.      Extraocular Movements: Extraocular movements intact.      Pupils: Pupils are equal, round, and reactive to light.   Cardiovascular:      Rate and Rhythm: Normal rate and regular rhythm.      Heart sounds: Normal heart sounds. No murmur heard.  Pulmonary:      Effort: No respiratory distress.      Breath sounds: No wheezing or rales.   Abdominal:      General: There is no distension.      Comments: Colostomy, vertical/midline abdominal excision, bandage clean/dry   Musculoskeletal:         General: No tenderness.      Cervical back: Normal range of motion. No rigidity.      Right lower leg: No edema.      Left lower leg: No edema.   Skin:     Findings: Bruising present. No erythema.   Neurological:      General: No focal deficit present.      Mental Status: She is alert and oriented to person, place, and time.   Psychiatric:         Mood and Affect: Mood normal.         Behavior: Behavior normal.       Significant Labs: All pertinent labs within the past 24 hours have been reviewed.  CBC:   Recent Labs   Lab 02/27/23 0307   WBC 4.68   HGB 9.2*   HCT 30.7*        CMP:   Recent Labs   Lab 02/27/23 0307 02/28/23  0405   * 131*   K 3.6 4.0   CL 91* 94*   CO2 30* 30*   * 95   BUN 19 18   CREATININE 0.7 0.7   CALCIUM 8.5* 8.6*   ANIONGAP 7* 7*     Magnesium:   Recent Labs   Lab 02/27/23 0307 02/28/23  0405   MG 1.8 1.8         Significant Imaging: I have reviewed all pertinent imaging results/findings within the past 24 hours.      Assessment/Plan:      * Colocutaneous fistula  S/p colocutaneous fistula revision with intra-op R ureteral stent placement for hydronephrosis. Developed post-op ileus.   Required post-op PCA pump now discontinued and on PRN  opioids    Repeat CT AP demonstrated fluid collection and CRP increased as well. Discussion with CRS with plan for starting PO moxifloxacin and if worsening symptoms or fevers likely need escalation to IV abx and ID consultation given allergies and limited options.     -  PO moxifloxacin  - oxy Q4 PRN and continue bowel regimen  - Continue Wound care  - per primary     Goals of care, counseling/discussion  Advance Care Planning     [facility]  I engaged the patient in a conversation about advance care planning and we specifically addressed what the goals of care would be moving forward, in light of the patient's chronic illness and prolonged hospitalization.  We did not specifically address the patient's likely prognosis, which is fair .  We explored the patient's values and preferences for future care.  The patient endorses that what is most important right now is to focus on spending time at home, avoiding the hospital, remaining as independent as possible, symptom/pain control and improvement in condition but with limits to invasive therapies.    Accordingly, we have decided that the best plan to meet the patient's goals includes  discussions and palliative care consult. Patient is also interested in completing a living will and POA paperwork.    I spent a total of 30 minutes engaging the patient in this advance care planning discussion.      Serious Illness Conversation Guide:  Conversation was held with:   patient[AN1.1]     What is your understanding now of where you are with your illness?:   Properly estimates survival[AN1.2]     How much information about what is likely to be ahead with your illness would you like from me?:   wants to be fully informed[AN1.1]     I want to share with you my understanding of where things are with your illness.:   not discussed[AN1.3]     Patient emotions observed or reported:   bargaining[AN1.2]     What are your most important goals if your health situation worsens?:  "  being mentally aware, being at home, being comfortable[AN1.3]  Comments: "I want antibiotics and I just want to go home and if I die, I die."[AN1.3]     What are your biggest fears and worries about the future with your health?:   physical suffering, being a burden[AN1.3]     What gives you strength as you think about the future of your illness?:   [employer] amna[AN1.3]     What abilities are so critical to your life that you can't imagine living without them? Or, what makes life meaningful?:   being conscious of surroundings, communicating with others, being able to care for oneself, living independently[AN1.3]     If you become sicker, how much are you willing to go through for the possibility of gaining more time?:   being in the ICU, enduring physical discomfort, having artificial nutrition, being in the hospital[AN1.3]  Comments: "no nursing home, no ventilator"[AN1.3]     How much does your family know about your priorities and wishes?:   patient has had some incomplete discussions with family, patient wants clinician to discuss with family[AN1.3]  Comments: Patient would like to see palliative.[AN1.3]     I recommend that we do the following to make sure your treatment plans reflect what's important to you. How does this plan seem to you?:   additional conversation with physician, conversation with family, advance directive, referral to pastoral care, referral to palliative care    Of note, patient would now like palliative care consultation and the  to see her. She feels she needs more support from family and at this point is only able to rely on her amna for support and strength. She would like her son, Abelardo, to be her first contact and surrogate decision maker. She would like to complete power of  paperwork and list Abelardo as her power of . She would also like to complete a living will. In addition, she would like for a physician or healthcare provider to help her discuss her " goals of care more in depth with her family, especially her son, Abelardo, and his wife who is a nurse.         Gross hematuria  Seen by urology and stated that hematuria 2/2 stent placement. No acute intervention.     Continue to monitor    Ileus  Patient has Post-operative ileus which is adynamic in etiology which is improving. This is inherent to her procedure/medications. Will treat conservatively with bowel rest, IV fluids, serial abdominal exams and avoidance of GI paralytics such as narcotics or anti-spasmodics. Monitor patient closely.     Diet advanced 2/12; resolved    Severe protein-calorie malnutrition  Nutrition consulted. Most recent weight and BMI monitored-                         Measurements:  Wt Readings from Last 1 Encounters:   02/26/23 51.3 kg (113 lb)   Body mass index is 20.02 kg/m².    Recommendations: Recommendation/Intervention: 1) Continue clear liquid   2) Continue Boost Plus & Boost Breeze  3) RD following  Goals: meets % een/epn by next rd f/u    Patient has been screened and assessed by RD. RD will follow patient.      Hyponatremia  Chronic mild hyponatremia on chart review, now moderate on presentation  Found to be in ADHF on arrival  BNP 2600, volume overloaded, JVD present, bilateral LE edema on consultation  CXR with mild pulmonary congestion  Reports poor diet at home (crackers/peanutbutter)  Reduced albumin likely 2/2 to malnutrition    Previously due to ADHF. Worsening back to 125, asymptomatic. Likely due to ADHF again as patient with rhales on examination.     - Urine 543, urine Na 24  - IV lasix 40 mg given on 2/27 with good improvement. Transitioned to PO lasix 20mg daily, will continue.  - will trend weight and follow up repeat BNP  - strict I/Os  - daily CMP  - replace electrolytes        Hypertension  Now Hypotension.  Intermittent difficulties with hypotension while inpatient. Unable to tolerate full HFrEF GDMT.   Continued hypotension while admitted and symptomatic  with ambulation. Will continue to titrate.     - Hold GDMT if SBP<95  - encourage PO intake     Hypercholesterolemia  Lipids well controlled  Has documented statin intolerance    - continue ezetimibe    Coronary artery disease  History of Cad with PCIx2 LAD in 2012 and LCX 1/2022  Home regimen: DAPT    No further surgical intervention planned inpatient by CRS or urology.   Reported episode of chest pain 2/19 that was reproducible with palpation on examination and no reported episodes while patient ambulating and working with PT though LH and dizziness did occur. EKG and trop reassuring.    -  aspirin  - clopidogrel  -  BB as tolerated  - no statin 2/2 intolerance    Automatic implantable cardioverter-defibrillator in situ        Ischemic cardiomyopathy  Cardiology consulted for management of ADHF, now signed off  Recent echo showed EF of 10%  Patient hypervolemic on presentation, appears more euvolemic today  Home GDMT: Toprol-XL 25, losartan 25 mg.  Initial CXR without new detrimental changes since prior, BNP 2600 on arrival  1x IV lasix on 2/14. Resolved now on PO lasix 20 mg.    2/23-discussion with patient and with primary about GOC and recommendation for palliative consultation. Patient reports whats most important is extending life even if that means repeated admissions. Patient reports being told previously by cardiology about hospice/palliative and was not receptive at that time. Stated she would consider changing viewpoint if she cannot leave the hospital         Recommendations:   - IV lasix 40 mg given 2/26 and on PO Lasiz 20, consider additional IV lasix if inadequate U/O with PO   - HTS and CTS evaluated patient with recommendations against any advanced options given age and debility. Will continue to attempt GDMT titration as tolerated currently only on BB therapy  - replete Mag>2, K>4  - Toprol-XL 25 mg increased with hold parameters  - DC losartan 12.5 mg qd given hypotension   - avoid IV  "anti-hypertensive  - holding home spironolactone, unable to tolerate full HFrEF GDMT      VTE Risk Mitigation (From admission, onward)           Ordered     Place sequential compression device  Until discontinued         02/06/23 1447     IP VTE HIGH RISK PATIENT  Once         02/06/23 1447                    Discharge Planning   SIVAN: 2/28/2023     Code Status: Full Code   Is the patient medically ready for discharge?: No    Reason for patient still in hospital (select all that apply): Patient trending condition, Treatment, Imaging, Consult recommendations and Pending disposition  Discharge Plan A: Home Health   Discharge Delays: None known at this time        Please secure chat Hospital Medicine M ("Medicine Consult Only") or contact me directly for any additional questions or concerns.        Hina Mota MD  Department of Hospital Medicine   [facility]  [facility]    "

## 2023-03-01 NOTE — PLAN OF CARE
Recommendations     1.) Recommend continuing with regular diet as tolerated, encouraging PO intake and honoring food preferences.      2.) Recommend continuing Boost plus TID, chilled as per pt's preferences, to help meet EEN/EPN.      3.) RD to monitor.        Goals: meets % een/epn by next rd f/u  Nutrition Goal Status: progressing towards goal  Communication of RD Recs:  (POC)

## 2023-03-01 NOTE — SUBJECTIVE & OBJECTIVE
Subjective:     Interval History: no acute events overnite, trying to ear more,drinking supplements, colostomy functional, adequate pain control    Post-Op Info:  Procedure(s) (LRB):  REVISION, COLOSTOMY (N/A)  INSERTION, CATHETER, URETER, BILATERAL (Left)  CYSTOSCOPY, WITH URETERAL STENT INSERTION (Right)  DEBRIDEMENT, ABDOMEN (N/A)  COLECTOMY, PARTIAL  LYSIS, ADHESIONS (N/A)   23 Days Post-Op      Medications:  Continuous Infusions:  Scheduled Meds:   aspirin  81 mg Oral Daily    balsam peru-castor oiL   Topical (Top) BID    clopidogreL  75 mg Oral Daily    ezetimibe  10 mg Oral Daily    famotidine  20 mg Oral BID    furosemide  20 mg Oral Daily    metoprolol succinate  25 mg Oral Daily    moxifloxacin  400 mg Oral Daily    nystatin  500,000 Units Oral QID    polyethylene glycol  17 g Oral Daily     PRN Meds:   acetaminophen    albuterol    ALPRAZolam    dextrose 10%    dextrose 10%    fluticasone propionate    naloxone    nitroGLYCERIN    ondansetron    oxyCODONE    oxyCODONE    prochlorperazine    simethicone    sodium chloride 0.9%    sodium chloride 0.9%        Objective:     Vital Signs (Most Recent):  Temp: 96.9 °F (36.1 °C) (03/01/23 1054)  Pulse: 73 (03/01/23 1054)  Resp: 18 (03/01/23 1054)  BP: (!) 96/52 (03/01/23 1054)  SpO2: 97 % (03/01/23 1054)   Vital Signs (24h Range):  Temp:  [96.5 °F (35.8 °C)-98.7 °F (37.1 °C)] 96.9 °F (36.1 °C)  Pulse:  [72-91] 73  Resp:  [16-18] 18  SpO2:  [95 %-99 %] 97 %  BP: ()/(47-67) 96/52     Intake/Output - Last 3 Shifts         02/27 0700 02/28 0659 02/28 0700 03/01 0659 03/01 0700 03/02 0659    P.O. 880 810 200    Total Intake(mL/kg) 880 (17.2) 810 (15.8) 200 (3.9)    Urine (mL/kg/hr) 800 (0.6) 500 (0.4) 300 (1.1)    Stool 140 120     Total Output 940 620 300    Net -60 +190 -100           Urine Occurrence 1 x      Stool Occurrence  1 x             Physical Exam  Vitals and nursing note reviewed.   Constitutional:       Appearance: She is well-developed.    HENT:      Head: Normocephalic.   Eyes:      Pupils: Pupils are equal, round, and reactive to light.   Cardiovascular:      Rate and Rhythm: Normal rate and regular rhythm.      Heart sounds: Normal heart sounds.   Pulmonary:      Effort: Pulmonary effort is normal. No respiratory distress.      Breath sounds: Normal breath sounds. No wheezing or rales.   Abdominal:      Palpations: Abdomen is soft. There is no mass.      Tenderness: There is no guarding or rebound.      Comments: Abd wound with packing in place  Colostomy functional   Skin:     General: Skin is warm and dry.   Neurological:      Mental Status: She is alert and oriented to person, place, and time.           Significant Labs:  BMP (Last 3 Results):   Recent Labs   Lab 02/26/23  0628 02/27/23  0307 02/28/23  0405 03/01/23  0833   * 123* 95 130*   * 128* 131* 132*   K 4.7 3.6 4.0 4.0   CL 92* 91* 94* 95   CO2 24 30* 30* 31*   BUN 17 19 18 13   CREATININE 0.7 0.7 0.7 0.7   CALCIUM 8.6* 8.5* 8.6* 8.9   MG 2.0 1.8 1.8  --      CBC (Last 3 Results):   Recent Labs   Lab 02/24/23  0613 02/27/23  0307 03/01/23  0833   WBC 2.64* 4.68 4.34   RBC 3.93* 3.86* 4.60   HGB 9.3* 9.2* 10.7*   HCT 31.3* 30.7* 36.0*    334 338   MCV 80* 80* 78*   MCH 23.7* 23.8* 23.3*   MCHC 29.7* 30.0* 29.7*       Significant Diagnostics:  None

## 2023-03-01 NOTE — ASSESSMENT & PLAN NOTE
Chronic mild hyponatremia on chart review, now moderate on presentation  Found to be in ADHF on arrival  BNP 2600, volume overloaded, JVD present, bilateral LE edema on consultation  CXR with mild pulmonary congestion  Reports poor diet at home (crackers/peanutbutter)  Reduced albumin likely 2/2 to malnutrition    Previously due to ADHF. Worsening back to 125, asymptomatic. Likely due to ADHF again as patient with rhales on examination.     - Urine 543, urine Na 24  - IV lasix 40 mg given on 2/27 with good improvement. Transitioned to PO lasix 20mg daily, will continue.  - will trend weight and follow up repeat BNP  - strict I/Os  - daily CMP pending this AM, will follow up  - replace electrolytes

## 2023-03-01 NOTE — ASSESSMENT & PLAN NOTE
Pt hypotensive throughout admission. Baseline SBPs 90-100s.    -hold metop and lasix for SBP < 95  -If worsening or persistent, consider septic workup in pt with known recent complicated UTI and post-surgical abdominal fluid collection  -continuing moxifloxacin for now (duration per primary)

## 2023-03-01 NOTE — ASSESSMENT & PLAN NOTE
Chronic mild hyponatremia on chart review, now moderate on presentation  Found to be in ADHF on arrival  BNP 2600, volume overloaded, JVD present, bilateral LE edema on consultation  CXR with mild pulmonary congestion  Reports poor diet at home (crackers/peanutbutter)  Reduced albumin likely 2/2 to malnutrition    Previously due to ADHF. Worsening back to 125, asymptomatic. Likely due to ADHF again as patient with rhales on examination.     - Urine 543, urine Na 24  - IV lasix 40 mg given on 2/27 with good improvement. Transitioned to PO lasix 20mg daily, will continue.  - will trend weight and follow up repeat BNP  - strict I/Os  - daily CMP revealed stable/improving hyponatremia. Continue lasix 20mg PO daily.  - replace electrolytes

## 2023-03-01 NOTE — ASSESSMENT & PLAN NOTE
MAPs < 65 since 0400 3/1.    -hold metop and lasix for SBP < 95  -f/u CBC once able  -Consider septic workup in pt with known recent complicated UTI and post-surgical abdominal fluid collection  -low threshold to broaden ABx   -continuing moxifloxacin for now (duration per primary)

## 2023-03-01 NOTE — PROGRESS NOTES
[facility] Medicine  Progress Note    Patient Name: Odette Hart  MRN: [id number]  Patient Class: IP- Inpatient   Admission Date: 1/31/2023  Length of Stay: 29 days  Attending Physician: Rafa Cardozo MD  Primary Care Provider: Braxton Barragan MD        Subjective:     Principal Problem:Colocutaneous fistula        HPI:  68 yo F with HTN, HLD (statin intolerance), DM, CAD s/p PCI to LAD (2012)/LCX (1/2022), HFrEF (EF10%), debility, severe malnutrition, and the following procedures performed 5/2022: cholecystectomy, takedown of colovesical and colovaginal fistula, sigmoid colectomy, partial cecectomy, end colostomy who presents for colocutaneous fistula repair with abdominal wall reconstruction. On admission she was found to be hyponatremic and in acute on chronic decompensated heart failure. Cardiology was consulted to manage her HF and have since signed off as her volume status has become more euvolemic following diuresis. Medicine was consulted for pr-eop evaluation and continued management of her hyponatremia. Per chart review patient is chronically, mildly hyponatremic at baseline. She takes Lasix, Toprol, spironolactone, and losartan for her HFrEF. She reports correct use of her home medications. She states her diet consists of peanut butter and crackers. Denies CP/SOB/nausea vomiting, cough fever chills, polydipsia. Her Na has been improving with diuresis. Patient has multiple risk factors that complicate surgery: acute on chronic decompensated HF, CAD with PCIx2, hypertension, hyperlipidemia, malnutrition. This being said she does not have reported/documented/observed dysfunction of liver, kidney, lung, clotting.    On examination, she is chronically ill appearing/in no acute distress/at mental baseline. AF, /58 (78), sat 98% on RA, no leukocytosis, hgb 11.8, Hct 38.5, Plts 237, moderate Hyponatremia Na 124 (initially 122 on admission) with labile Baseline, K 3.1, Cl 85, Bun 13,  Cr 00.8, Ca 7.9, albumin 2-3. BNP 2600, UOP 2300 on lasix 80mg    CXR with mild pulmonary congestion      Overview/Hospital Course:  Admitted for planned colocutaneous fistula repair, found to be hyponatremic and in ADHF. Started on IV diuresis, >6 L UOP, returned to euvolemia. Urine/serum studies consistent with hypervolemic hyponatremia most likely in 2/2 to acute on chronic HF. She does have multiple factors that maker her high risk for surgery, primarily her severrely reduced LVEF 10%. Cardiology and medicine consulted for pre-operative evaluation. Agree she is high risk, but need of her procedure outweigh the risks. 2/6 Patient tolerated colocutaneous takedown/reconstruction well. Her Hyponatremia has resolved with Lasix 80mg BID. S/p colocutaneous fistula revision with intra-op R ureteral stent placement for hydronephrosis. Developed post-op ileus. Given lasix holiday for over diuresis. Plavix, ASA restarted post-op. Anticipate discharge on prior home PO lasix dosing.     Complaint of dysuria and dark cloudy urine noted by primary team, patient started on cipro then transitioned to cefpodoxime (broader coverage) given culture results of multiple organisms none in predominance (thus an unequivocal UCx).  Recently discontinued from PCA pump with PRN pain management of oxy 5 and 10 mg. Restarted lasix 20 mg on 2/14/23. CRS monitoring surgical site closely, some output of serous fluid, no signs of cellulitis. Continued symptomatic hypotension despite discontinuing GDMT. CTS and HTS both evaluated patient and deemed there are likely no advanced options given debility and age. Recommended palliative care consultation which was relayed to primary team.    Patient with new onset hematuria and reported dysuira. Call to lab concerning UA completed which demonstrated WBC clumps and no WBC was addend to show just hematuria. Urology was contacted given recent stent placement and plan to evaluate patient. Repeat CT ordered  by CRS which demonstrated likely fluid collection for which CRS plans to manage with continued PO Moxifloxacin. Will monitor for tolerance      Interval History: No new complaints overnight, although MAPs < 65 at 4 am and 7 am. Labs pending. Ostomy functional. Discussed goals of care and completed the Serious Illness Conversation Guide with Ms. Hart yesterday afternoon and palliative care was consulted. They completed POA paperwork, but pt ultimately decided she is not interested in meeting with palliative care at this time.     Review of Systems   Constitutional:  Negative for chills and fever.   HENT:  Negative for sinus pain.    Eyes:  Negative for pain and visual disturbance.   Respiratory:  Negative for cough and shortness of breath.    Cardiovascular:  Negative for chest pain, palpitations and leg swelling.   Gastrointestinal:  Positive for abdominal pain. Negative for blood in stool, diarrhea and vomiting.   Genitourinary:  Positive for hematuria. Negative for difficulty urinating and dysuria.   Neurological:  Positive for weakness and light-headedness. Negative for dizziness.   Objective:     Vital Signs (Most Recent):  Temp: 96.5 °F (35.8 °C) (03/01/23 0711)  Pulse: 72 (03/01/23 0711)  Resp: 18 (03/01/23 0711)  BP: (!) 89/52 (03/01/23 0711)  SpO2: 97 % (03/01/23 0711)   Vital Signs (24h Range):  Temp:  [96.5 °F (35.8 °C)-98.7 °F (37.1 °C)] 96.5 °F (35.8 °C)  Pulse:  [72-91] 72  Resp:  [16-18] 18  SpO2:  [95 %-99 %] 97 %  BP: ()/(47-67) 89/52     Weight: 51.3 kg (113 lb)  Body mass index is 20.02 kg/m².    Intake/Output Summary (Last 24 hours) at 3/1/2023 0892  Last data filed at 3/1/2023 0713  Gross per 24 hour   Intake 1010 ml   Output 920 ml   Net 90 ml        Physical Exam  Vitals and nursing note reviewed.   Constitutional:       General: She is not in acute distress.     Appearance: She is ill-appearing.   HENT:      Head: Normocephalic and atraumatic.      Right Ear: External ear normal.       Left Ear: External ear normal.      Nose: No congestion or rhinorrhea.   Eyes:      General:         Right eye: No discharge.         Left eye: No discharge.      Extraocular Movements: Extraocular movements intact.      Pupils: Pupils are equal, round, and reactive to light.   Cardiovascular:      Rate and Rhythm: Normal rate and regular rhythm.      Heart sounds: Normal heart sounds. No murmur heard.  Pulmonary:      Effort: No respiratory distress.      Breath sounds: No wheezing or rales.   Abdominal:      General: There is no distension.      Comments: Colostomy, vertical/midline abdominal excision, bandage clean/dry   Musculoskeletal:         General: No tenderness.      Cervical back: Normal range of motion. No rigidity.      Right lower leg: No edema.      Left lower leg: No edema.   Skin:     Findings: Bruising present. No erythema.   Neurological:      General: No focal deficit present.      Mental Status: She is alert and oriented to person, place, and time.   Psychiatric:         Mood and Affect: Mood normal.         Behavior: Behavior normal.       Significant Labs: All pertinent labs within the past 24 hours have been reviewed.  CBC:   No results for input(s): WBC, HGB, HCT, PLT in the last 48 hours.    CMP:   Recent Labs   Lab 02/28/23  0405   *   K 4.0   CL 94*   CO2 30*   GLU 95   BUN 18   CREATININE 0.7   CALCIUM 8.6*   ANIONGAP 7*     Magnesium:   Recent Labs   Lab 02/28/23  0405   MG 1.8         Significant Imaging: I have reviewed all pertinent imaging results/findings within the past 24 hours.      Assessment/Plan:      * Colocutaneous fistula  S/p colocutaneous fistula revision with intra-op R ureteral stent placement for hydronephrosis. Developed post-op ileus.   Required post-op PCA pump now discontinued and on PRN opioids    Repeat CT AP demonstrated fluid collection and CRP increased as well. Discussion with CRS with plan for starting PO moxifloxacin and if worsening symptoms or  "fevers likely need escalation to IV abx and ID consultation given allergies and limited options.     -  PO moxifloxacin  - oxy Q4 PRN and continue bowel regimen  - Continue Wound care  - per primary     Goals of care, counseling/discussion  Advance Care Planning     [facility]  I engaged the patient in a conversation about advance care planning and we specifically addressed what the goals of care would be moving forward, in light of the patient's chronic illness and prolonged hospitalization.  We did not specifically address the patient's likely prognosis, which is fair .  We explored the patient's values and preferences for future care.  The patient endorses that what is most important right now is to focus on spending time at home, avoiding the hospital, remaining as independent as possible, symptom/pain control and improvement in condition but with limits to invasive therapies.    Accordingly, we have decided that the best plan to meet the patient's goals includes  discussions and palliative care consult. Patient is also interested in completing a living will and POA paperwork.    I spent a total of 30 minutes engaging the patient in this advance care planning discussion.      Serious Illness Conversation Guide:  Conversation was held with:   patient[AN1.1]     What is your understanding now of where you are with your illness?:   Properly estimates survival[AN1.2]     How much information about what is likely to be ahead with your illness would you like from me?:   wants to be fully informed[AN1.1]     I want to share with you my understanding of where things are with your illness.:   not discussed[AN1.3]     Patient emotions observed or reported:   bargaining[AN1.2]     What are your most important goals if your health situation worsens?:   being mentally aware, being at home, being comfortable[AN1.3]  Comments: "I want antibiotics and I just want to go home and if I die, I die."[AN1.3]     What are your " "biggest fears and worries about the future with your health?:   physical suffering, being a burden[AN1.3]     What gives you strength as you think about the future of your illness?:   [employer] amna[AN1.3]     What abilities are so critical to your life that you can't imagine living without them? Or, what makes life meaningful?:   being conscious of surroundings, communicating with others, being able to care for oneself, living independently[AN1.3]     If you become sicker, how much are you willing to go through for the possibility of gaining more time?:   being in the ICU, enduring physical discomfort, having artificial nutrition, being in the hospital[AN1.3]  Comments: "no nursing home, no ventilator"[AN1.3]     How much does your family know about your priorities and wishes?:   patient has had some incomplete discussions with family, patient wants clinician to discuss with family[AN1.3]  Comments: Patient would like to see palliative.[AN1.3]     I recommend that we do the following to make sure your treatment plans reflect what's important to you. How does this plan seem to you?:   additional conversation with physician, conversation with family, advance directive, referral to pastoral care, referral to palliative care    Of note, patient would like the  to see her. She feels she needs more support from family and at this point is only able to rely on her amna for support and strength. She would like her son, Abelardo, to be her first contact and surrogate decision maker. She would like to complete power of  paperwork and list Abelardo as her power of . She would also like to complete a living will. In addition, she would like for a physician or healthcare provider to help her discuss her goals of care more in depth with her family, especially her son, Abelardo, and his wife who is a nurse.    Pall care consulted 2/28, patient requested cancellation of the consult because she is not ready to talk " "about "depressing things" at this time.              Gross hematuria  Seen by urology and stated that hematuria 2/2 stent placement. No acute intervention.     Continue to monitor    Ileus  Patient has Post-operative ileus which is adynamic in etiology which is improving. This is inherent to her procedure/medications. Will treat conservatively with bowel rest, IV fluids, serial abdominal exams and avoidance of GI paralytics such as narcotics or anti-spasmodics. Monitor patient closely.     Diet advanced 2/12; resolved    Severe protein-calorie malnutrition  Nutrition consulted. Most recent weight and BMI monitored-                         Measurements:  Wt Readings from Last 1 Encounters:   02/26/23 51.3 kg (113 lb)   Body mass index is 20.02 kg/m².    Recommendations: Recommendation/Intervention: 1) Continue clear liquid   2) Continue Boost Plus & Boost Breeze  3) RD following  Goals: meets % een/epn by next rd f/u    Patient has been screened and assessed by RD. RD will follow patient.      Hypotension  Pt hypotensive throughout admission. Baseline SBPs 90-100s.    -hold metop and lasix for SBP < 95  -If worsening or persistent, consider septic workup in pt with known recent complicated UTI and post-surgical abdominal fluid collection  -continuing moxifloxacin for now (duration per primary)      Hyponatremia  Chronic mild hyponatremia on chart review, now moderate on presentation  Found to be in ADHF on arrival  BNP 2600, volume overloaded, JVD present, bilateral LE edema on consultation  CXR with mild pulmonary congestion  Reports poor diet at home (crackers/peanutbutter)  Reduced albumin likely 2/2 to malnutrition    Previously due to ADHF. Worsening back to 125, asymptomatic. Likely due to ADHF again as patient with rhales on examination.     - Urine 543, urine Na 24  - IV lasix 40 mg given on 2/27 with good improvement. Transitioned to PO lasix 20mg daily, will continue.  - will trend weight and follow " up repeat BNP  - strict I/Os  - daily CMP revealed stable/improving hyponatremia. Continue lasix 20mg PO daily.  - replace electrolytes        Hypertension  Now Hypotension.  Intermittent difficulties with hypotension while inpatient. Unable to tolerate full HFrEF GDMT.   Continued hypotension while admitted and symptomatic with ambulation. Will continue to titrate.     - Hold GDMT if SBP<95  - encourage PO intake     Hypercholesterolemia  Lipids well controlled  Has documented statin intolerance    - continue ezetimibe    Coronary artery disease  History of Cad with PCIx2 LAD in 2012 and LCX 1/2022  Home regimen: DAPT    No further surgical intervention planned inpatient by CRS or urology.   Reported episode of chest pain 2/19 that was reproducible with palpation on examination and no reported episodes while patient ambulating and working with PT though LH and dizziness did occur. EKG and trop reassuring.    -  aspirin  - clopidogrel  -  BB as tolerated  - no statin 2/2 intolerance    Automatic implantable cardioverter-defibrillator in situ        Ischemic cardiomyopathy  Cardiology consulted for management of ADHF, now signed off  Recent echo showed EF of 10%  Patient hypervolemic on presentation, appears more euvolemic today  Home GDMT: Toprol-XL 25, losartan 25 mg.  Initial CXR without new detrimental changes since prior, BNP 2600 on arrival  1x IV lasix on 2/14. Resolved now on PO lasix 20 mg.    2/23-discussion with patient and with primary about GOC and recommendation for palliative consultation. Patient reports whats most important is extending life even if that means repeated admissions. Patient reports being told previously by cardiology about hospice/palliative and was not receptive at that time. Stated she would consider changing viewpoint if she cannot leave the hospital         Recommendations:   - IV lasix 40 mg given 2/26 and on PO Lasiz 20, consider additional IV lasix if inadequate U/O with PO   -  "HTS and CTS evaluated patient with recommendations against any advanced options given age and debility. Will continue to attempt GDMT titration as tolerated currently only on BB therapy  - replete Mag>2, K>4  - Toprol-XL 25 mg increased with hold parameters  - DC losartan 12.5 mg qd given hypotension   - avoid IV anti-hypertensive  - holding home spironolactone, unable to tolerate full HFrEF GDMT      VTE Risk Mitigation (From admission, onward)           Ordered     Place sequential compression device  Until discontinued         02/06/23 1447     IP VTE HIGH RISK PATIENT  Once         02/06/23 1447                    Discharge Planning   SIVAN: 3/3/2023     Code Status: Full Code   Is the patient medically ready for discharge?: No    Reason for patient still in hospital (select all that apply): Laboratory test, Treatment and Pending disposition  Discharge Plan A: SNF vs   Discharge Delays: None known at this time    Please secure chat Hospital Medicine M ("Medicine Consult Only") or contact me directly for any additional questions or concerns. We will sign off.          Hina Mota MD  Department of Hospital Medicine   [facility]  [facility]    "

## 2023-03-01 NOTE — PROGRESS NOTES
[facility]  Colorectal Surgery  Progress Note    Patient Name: Odette Hart  MRN: [id number]  Admission Date: 1/31/2023  Hospital Length of Stay: 29 days  Attending Physician: Rafa Cardozo MD    Subjective:     Interval History: no acute events overnite, trying to ear more,drinking supplements, colostomy functional, adequate pain control    Post-Op Info:  Procedure(s) (LRB):  REVISION, COLOSTOMY (N/A)  INSERTION, CATHETER, URETER, BILATERAL (Left)  CYSTOSCOPY, WITH URETERAL STENT INSERTION (Right)  DEBRIDEMENT, ABDOMEN (N/A)  COLECTOMY, PARTIAL  LYSIS, ADHESIONS (N/A)   23 Days Post-Op      Medications:  Continuous Infusions:  Scheduled Meds:   aspirin  81 mg Oral Daily    balsam peru-castor oiL   Topical (Top) BID    clopidogreL  75 mg Oral Daily    ezetimibe  10 mg Oral Daily    famotidine  20 mg Oral BID    furosemide  20 mg Oral Daily    metoprolol succinate  25 mg Oral Daily    moxifloxacin  400 mg Oral Daily    nystatin  500,000 Units Oral QID    polyethylene glycol  17 g Oral Daily     PRN Meds:   acetaminophen    albuterol    ALPRAZolam    dextrose 10%    dextrose 10%    fluticasone propionate    naloxone    nitroGLYCERIN    ondansetron    oxyCODONE    oxyCODONE    prochlorperazine    simethicone    sodium chloride 0.9%    sodium chloride 0.9%        Objective:     Vital Signs (Most Recent):  Temp: 96.9 °F (36.1 °C) (03/01/23 1054)  Pulse: 73 (03/01/23 1054)  Resp: 18 (03/01/23 1054)  BP: (!) 96/52 (03/01/23 1054)  SpO2: 97 % (03/01/23 1054)   Vital Signs (24h Range):  Temp:  [96.5 °F (35.8 °C)-98.7 °F (37.1 °C)] 96.9 °F (36.1 °C)  Pulse:  [72-91] 73  Resp:  [16-18] 18  SpO2:  [95 %-99 %] 97 %  BP: ()/(47-67) 96/52     Intake/Output - Last 3 Shifts         02/27 0700  02/28 0659 02/28 0700  03/01 0659 03/01 0700  03/02 0659    P.O. 880 810 200    Total Intake(mL/kg) 880 (17.2) 810 (15.8) 200 (3.9)    Urine (mL/kg/hr) 800 (0.6) 500 (0.4) 300 (1.1)    Stool 140  120     Total Output 940 620 300    Net -60 +190 -100           Urine Occurrence 1 x      Stool Occurrence  1 x             Physical Exam  Vitals and nursing note reviewed.   Constitutional:       Appearance: She is well-developed.   HENT:      Head: Normocephalic.   Eyes:      Pupils: Pupils are equal, round, and reactive to light.   Cardiovascular:      Rate and Rhythm: Normal rate and regular rhythm.      Heart sounds: Normal heart sounds.   Pulmonary:      Effort: Pulmonary effort is normal. No respiratory distress.      Breath sounds: Normal breath sounds. No wheezing or rales.   Abdominal:      Palpations: Abdomen is soft. There is no mass.      Tenderness: There is no guarding or rebound.      Comments: Abd wound with packing in place  Colostomy functional   Skin:     General: Skin is warm and dry.   Neurological:      Mental Status: She is alert and oriented to person, place, and time.           Significant Labs:  BMP (Last 3 Results):   Recent Labs   Lab 02/26/23  0628 02/27/23  0307 02/28/23  0405 03/01/23  0833   * 123* 95 130*   * 128* 131* 132*   K 4.7 3.6 4.0 4.0   CL 92* 91* 94* 95   CO2 24 30* 30* 31*   BUN 17 19 18 13   CREATININE 0.7 0.7 0.7 0.7   CALCIUM 8.6* 8.5* 8.6* 8.9   MG 2.0 1.8 1.8  --      CBC (Last 3 Results):   Recent Labs   Lab 02/24/23  0613 02/27/23  0307 03/01/23  0833   WBC 2.64* 4.68 4.34   RBC 3.93* 3.86* 4.60   HGB 9.3* 9.2* 10.7*   HCT 31.3* 30.7* 36.0*    334 338   MCV 80* 80* 78*   MCH 23.7* 23.8* 23.3*   MCHC 29.7* 30.0* 29.7*       Significant Diagnostics:  None    Assessment/Plan:     * Colocutaneous fistula  OR 2/6/23 revision colostomy (see op note)    -await return of bowel function, colostomy funcitonal  -continue multi modality for pain control  -encourage ambulation and use of IS  -phani hose and SCD  -prophalactic lovenox and PPI  -cont local wound care, abd wound with packing, due to location on lower abd and large abd pt can not perform wound  care herself    Ileus  Post op ileus not surgical complication but expected outcome  Ileus now resolved       Severe protein-calorie malnutrition  Nutrition consulted. Most recent weight and BMI monitored-                         Measurements:  Wt Readings from Last 1 Encounters:   02/26/23 51.3 kg (113 lb)   Body mass index is 20.02 kg/m².    Recommendations: Recommendation/Intervention: 1) Continue clear liquid   2) Continue Boost Plus & Boost Breeze  3) RD following  Goals: meets % een/epn by next rd f/u    Patient has been screened and assessed by RD. RD will follow patient.      Hyponatremia  Monitor labs  Appreciate medicine input    Hypertension  Chronic, controlled.  Latest blood pressure and vitals reviewed-   Temp:  [96.5 °F (35.8 °C)-98.7 °F (37.1 °C)]   Pulse:  [72-91]   Resp:  [16-18]   BP: ()/(47-67)   SpO2:  [95 %-99 %] .   Home meds for hypertension were reviewed and noted below.   Hypertension Medications             furosemide (LASIX) 40 MG tablet Take 1 tablet (40 mg total) by mouth once daily.    losartan (COZAAR) 25 MG tablet Take 1 tablet (25 mg total) by mouth once daily.    metoprolol succinate (TOPROL-XL) 25 MG 24 hr tablet Take 1 tablet (25 mg total) by mouth once daily.    nitroGLYCERIN (NITROSTAT) 0.4 MG SL tablet Place 1 tablet (0.4 mg total) under the tongue every 5 (five) minutes as needed for Chest pain.    spironolactone (ALDACTONE) 25 MG tablet Take 0.5 tablets (12.5 mg total) by mouth once daily.          While in the hospital, will manage blood pressure as follows; Continue home antihypertensive regimen    Will utilize p.r.n. blood pressure medication only if patient's blood pressure greater than  180/110 and she develops symptoms such as worsening chest pain or shortness of breath.    Coronary artery disease  Cont home meds  appreciate medicine input    Automatic implantable cardioverter-defibrillator in situ  Cont tele    Ischemic cardiomyopathy  Cont home  meds  appeciate medicine assistance with multiple medical problems  Monitor vs  monitior labs          Carola Buckley NP  Colorectal Surgery  [facility]

## 2023-03-01 NOTE — PROGRESS NOTES
[facility] Espinal [facility]  Adult Nutrition  Progress Note    SUMMARY       Recommendations    1.) Recommend continuing with regular diet as tolerated, encouraging PO intake and honoring food preferences.     2.) Recommend continuing Boost plus TID, chilled as per pt's preferences, to help meet EEN/EPN.     3.) RD to monitor.      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    Signs and Symptoms (as evidenced by):  Energy Intake: <50% of estimated energy requirement for greater than 2 weeks  Body Fat Depletion: moderate depletion of triceps   Muscle Mass Depletion: moderate depletion of temples and clavicle region   Weight Loss: -7%% x 29 days   Fluid Accumulation:  none noted    Interventions(treatment strategy):  Collaboration of nutritional care with other providers  ONS    Nutrition Diagnosis Status:  New   Malnutrition Assessment  Malnutrition Type: acute illness or injury          Weight Loss (Malnutrition): greater than 5% in 1 month  Energy Intake (Malnutrition): less than or equal to 50% for greater than or equal to 5 days   Upper Arm Region (Subcutaneous Fat Loss): moderate depletion   [place]ri[place] Region (Muscle Loss): moderate depletion       Subcutaneous Fat Loss (Final Summary): moderate protein-calorie malnutrition  Muscle Loss Evaluation (Final Summary): moderate protein-calorie malnutrition    Severe Weight Loss (Malnutrition): greater than 5% in 1 month    Reason for Assessment    Reason For Assessment: RD follow-up  Diagnosis: gastrointestinal disease (Colocutaneous fistula)  Relevant Medical History: HTN, CAD , HFrEH, MDD, severe malnutrition, cholecystectomy  Interdisciplinary Rounds: did not attend  General Information Comments: RD f/u:  "Pt seen by RD. Pt denies n/v/d/c. Pt endorses poor appetite, eating <50% of meals. Pt stated to RD that they try to drink the Boost Plus. Wt loss noted: -[id number]. NFPE performed on 3/1. Wasting in the temples and triceps noted. Pt meets criteria for malnutrition.  Nutrition Discharge Planning: as per medical course    Nutrition Risk Screen    Nutrition Risk Screen: reduced oral intake over the last month    Nutrition/Diet History    Spiritual, Cultural Beliefs, [employer] Practices, Values that Affect Care: no  Food Allergies: shellfish    Anthropometrics    Temp: 96.9 °F (36.1 °C)  Height Method: Stated  Height: 5' 3" (160 cm)  Height (inches): 63 in  Weight Method: Standard Scale  Weight: 51.3 kg (113 lb)  Weight (lb): 113 lb  Ideal Body Weight (IBW), Female: 115 lb  % Ideal Body Weight, Female (lb): 107.83 %  BMI (Calculated): 20  BMI Grade: 18.5-24.9 - normal     Lab/Procedures/Meds    Pertinent Labs Reviewed: reviewed  Pertinent Labs Comments: Na: 132, gluc: 130  Pertinent Medications Reviewed: reviewed  Pertinent Medications Comments: enoxaparin, famotidine, furosemide, polyethylene glycol    Estimated/Assessed Needs    Weight Used For Calorie Calculations: 51.3 kg (113 lb 1.5 oz)  Energy Calorie Requirements (kcal): 1283- 1539kcal (25-30kcal/kg)  Energy Need Method: Kcal/kg  Protein Requirements: 62- 72g (1.2-1.4g/kg)  Weight Used For Protein Calculations: 51.3 kg (113 lb 1.5 oz)        RDA Method (mL): 1283     Nutrition Prescription Ordered    Current Diet Order: regular diet  Oral Nutrition Supplement: Boost plus    Evaluation of Received Nutrient/Fluid Intake    I/O: -2050ml since 2/24  Energy Calories Required: not meeting needs  Protein Required: not meeting needs  Fluid Required:  (as per MD)  Total Fluid Intake (mL/kg): as per MD  Comments: LBM 2/28  Tolerance: not tolerating  % Intake of Estimated Energy Needs: 25 - 50 %  % Meal Intake: 25 - 50 %    Nutrition Risk    Level of Risk/Frequency of " Follow-up:  (RD to f/u x1/week)     Monitor and Evaluation    Food and Nutrient Intake: energy intake, food and beverage intake  Food and Nutrient Adminstration: diet order  Knowledge/Beliefs/Attitudes: food and nutrition knowledge/skill, beliefs and attitudes  Physical Activity and Function: nutrition-related ADLs and IADLs  Anthropometric Measurements: height/length, weight, weight change, body mass index  Biochemical Data, Medical Tests and Procedures: electrolyte and renal panel, gastrointestinal profile, glucose/endocrine profile, inflammatory profile, lipid profile  Nutrition-Focused Physical Findings: overall appearance, extremities, muscles and bones, skin     Nutrition Follow-Up    RD Follow-up?: Yes

## 2023-03-01 NOTE — PLAN OF CARE
Problem: Adult Inpatient Plan of Care  Goal: Plan of Care Review  Outcome: Ongoing, Progressing  Goal: Patient-Specific Goal (Individualized)  Outcome: Ongoing, Progressing  Goal: Absence of Hospital-Acquired Illness or Injury  Outcome: Ongoing, Progressing  Goal: Optimal Comfort and Wellbeing  Outcome: Ongoing, Progressing  Goal: Readiness for Transition of Care  Outcome: Ongoing, Progressing     Problem: Fall Injury Risk  Goal: Absence of Fall and Fall-Related Injury  Outcome: Ongoing, Progressing     Problem: Impaired Wound Healing  Goal: Optimal Wound Healing  Outcome: Ongoing, Progressing     Problem: Infection  Goal: Absence of Infection Signs and Symptoms  Outcome: Ongoing, Progressing     Problem: Skin Injury Risk Increased  Goal: Skin Health and Integrity  Outcome: Ongoing, Progressing     Problem: Coping Ineffective  Goal: Effective Coping  Outcome: Ongoing, Progressing     Problem: Pain Acute  Goal: Acceptable Pain Control and Functional Ability  Outcome: Ongoing, Progressing     Problem: Adjustment to Surgery (Ileostomy)  Goal: Optimal Coping  Outcome: Ongoing, Progressing     Problem: Bleeding (Ileostomy)  Goal: Absence of Bleeding  Outcome: Ongoing, Progressing     Problem: Fluid, Electrolyte and Nutrition Imbalance (Ileostomy)  Goal: Fluid, Electrolyte and Nutrition Balance  Outcome: Ongoing, Progressing     Problem: Infection (Ileostomy)  Goal: Absence of Infection Signs and Symptoms  Outcome: Ongoing, Progressing     Problem: Ongoing Anesthesia Effects (Ileostomy)  Goal: Anesthesia/Sedation Recovery  Outcome: Ongoing, Progressing     Problem: Pain (Ileostomy)  Goal: Acceptable Pain Control  Outcome: Ongoing, Progressing     Problem: Postoperative Nausea and Vomiting (Ileostomy)  Goal: Nausea and Vomiting Relief  Outcome: Ongoing, Progressing     Problem: Postoperative Stoma Care (Ileostomy)  Goal: Optimal Stoma Healing  Outcome: Ongoing, Progressing     Problem: Postoperative Urinary Retention  (Ileostomy)  Goal: Effective Urinary Elimination  Outcome: Ongoing, Progressing     Problem: Respiratory Compromise (Ileostomy)  Goal: Effective Oxygenation and Ventilation  Outcome: Ongoing, Progressing

## 2023-03-01 NOTE — PLAN OF CARE
Problem: Adult Inpatient Plan of Care  Goal: Optimal Comfort and Wellbeing  2/28/2023 2006 by Jacqui Oliva RN  Outcome: Ongoing, Not Progressing  2/28/2023 2005 by Jacqui Oliva RN  Outcome: Ongoing, Progressing     Problem: Impaired Wound Healing  Goal: Optimal Wound Healing  2/28/2023 2006 by Jacqui Oliva RN  Outcome: Ongoing, Not Progressing  2/28/2023 2005 by Jacqui Oliva RN  Outcome: Ongoing, Not Progressing     Problem: Coping Ineffective  Goal: Effective Coping  2/28/2023 2006 by Jacqui Oliva RN  Outcome: Ongoing, Not Progressing  2/28/2023 2005 by Jacqui Oliva RN  Outcome: Ongoing, Not Progressing     Problem: Adult Inpatient Plan of Care  Goal: Plan of Care Review  2/28/2023 2006 by Jacqui Oliva RN  Outcome: Ongoing, Progressing  2/28/2023 2005 by Jacqui Oliva RN  Outcome: Ongoing, Progressing  Goal: Patient-Specific Goal (Individualized)  2/28/2023 2006 by Jacqui Oliva RN  Outcome: Ongoing, Progressing  2/28/2023 2005 by Jacqui Oliva RN  Outcome: Ongoing, Progressing  Goal: Absence of Hospital-Acquired Illness or Injury  2/28/2023 2006 by Jacqui Oliva RN  Outcome: Ongoing, Progressing  2/28/2023 2005 by Jacqui Oliva RN  Outcome: Ongoing, Progressing  Goal: Readiness for Transition of Care  2/28/2023 2006 by Jacqui Oliva RN  Outcome: Ongoing, Progressing  2/28/2023 2005 by Jacqui Oliva RN  Outcome: Ongoing, Progressing     Problem: Fall Injury Risk  Goal: Absence of Fall and Fall-Related Injury  2/28/2023 2006 by Jacqui Oliva RN  Outcome: Ongoing, Progressing  2/28/2023 2005 by Jacqui Oliva RN  Outcome: Ongoing, Progressing     Problem: Infection  Goal: Absence of Infection Signs and Symptoms  2/28/2023 2006 by Jacqui Oliva RN  Outcome: Ongoing, Progressing  2/28/2023 2005 by Jacqui Oliva RN  Outcome: Ongoing, Progressing     Problem: Skin Injury Risk Increased  Goal: Skin Health and  Integrity  2/28/2023 2006 by Jacqui Oliva RN  Outcome: Ongoing, Progressing  2/28/2023 2005 by Jacqui Oliva RN  Outcome: Ongoing, Not Progressing     Problem: Pain Acute  Goal: Acceptable Pain Control and Functional Ability  Outcome: Ongoing, Progressing     Problem: Adjustment to Surgery (Ileostomy)  Goal: Optimal Coping  Outcome: Ongoing, Progressing     Problem: Bleeding (Ileostomy)  Goal: Absence of Bleeding  Outcome: Ongoing, Progressing     Problem: Fluid, Electrolyte and Nutrition Imbalance (Ileostomy)  Goal: Fluid, Electrolyte and Nutrition Balance  Outcome: Ongoing, Progressing     Problem: Infection (Ileostomy)  Goal: Absence of Infection Signs and Symptoms  Outcome: Ongoing, Progressing     Problem: Ongoing Anesthesia Effects (Ileostomy)  Goal: Anesthesia/Sedation Recovery  Outcome: Ongoing, Progressing     Problem: Pain (Ileostomy)  Goal: Acceptable Pain Control  Outcome: Ongoing, Progressing     Problem: Postoperative Nausea and Vomiting (Ileostomy)  Goal: Nausea and Vomiting Relief  Outcome: Ongoing, Progressing     Problem: Postoperative Stoma Care (Ileostomy)  Goal: Optimal Stoma Healing  Outcome: Ongoing, Progressing     Problem: Postoperative Urinary Retention (Ileostomy)  Goal: Effective Urinary Elimination  Outcome: Ongoing, Progressing     Problem: Respiratory Compromise (Ileostomy)  Goal: Effective Oxygenation and Ventilation  Outcome: Ongoing, Progressing

## 2023-03-01 NOTE — ASSESSMENT & PLAN NOTE
"Advance Care Planning     [facility]  I engaged the patient in a conversation about advance care planning and we specifically addressed what the goals of care would be moving forward, in light of the patient's chronic illness and prolonged hospitalization.  We did not specifically address the patient's likely prognosis, which is fair .  We explored the patient's values and preferences for future care.  The patient endorses that what is most important right now is to focus on spending time at home, avoiding the hospital, remaining as independent as possible, symptom/pain control and improvement in condition but with limits to invasive therapies.    Accordingly, we have decided that the best plan to meet the patient's goals includes  discussions and palliative care consult. Patient is also interested in completing a living will and POA paperwork.    I spent a total of 30 minutes engaging the patient in this advance care planning discussion.      Serious Illness Conversation Guide:  Conversation was held with:   patient[AN1.1]     What is your understanding now of where you are with your illness?:   Properly estimates survival[AN1.2]     How much information about what is likely to be ahead with your illness would you like from me?:   wants to be fully informed[AN1.1]     I want to share with you my understanding of where things are with your illness.:   not discussed[AN1.3]     Patient emotions observed or reported:   bargaining[AN1.2]     What are your most important goals if your health situation worsens?:   being mentally aware, being at home, being comfortable[AN1.3]  Comments: "I want antibiotics and I just want to go home and if I die, I die."[AN1.3]     What are your biggest fears and worries about the future with your health?:   physical suffering, being a burden[AN1.3]     What gives you strength as you think about the future of your illness?:   [employer] amna[AN1.3]     What abilities are so critical to " "your life that you can't imagine living without them? Or, what makes life meaningful?:   being conscious of surroundings, communicating with others, being able to care for oneself, living independently[AN1.3]     If you become sicker, how much are you willing to go through for the possibility of gaining more time?:   being in the ICU, enduring physical discomfort, having artificial nutrition, being in the hospital[AN1.3]  Comments: "no nursing home, no ventilator"[AN1.3]     How much does your family know about your priorities and wishes?:   patient has had some incomplete discussions with family, patient wants clinician to discuss with family[AN1.3]  Comments: Patient would like to see palliative.[AN1.3]     I recommend that we do the following to make sure your treatment plans reflect what's important to you. How does this plan seem to you?:   additional conversation with physician, conversation with family, advance directive, referral to pastoral care, referral to palliative care    Of note, patient would like the  to see her. She feels she needs more support from family and at this point is only able to rely on her amna for support and strength. She would like her son, Abelardo, to be her first contact and surrogate decision maker. She would like to complete power of  paperwork and list Abelardo as her power of . She would also like to complete a living will. In addition, she would like for a physician or healthcare provider to help her discuss her goals of care more in depth with her family, especially her son, Abelardo, and his wife who is a nurse.    [facility] care consulted 2/28, patient requested cancellation of the consult because she is not ready to talk about "depressing things" at this time.             "

## 2023-03-01 NOTE — ASSESSMENT & PLAN NOTE
Chronic, controlled.  Latest blood pressure and vitals reviewed-   Temp:  [96.5 °F (35.8 °C)-98.7 °F (37.1 °C)]   Pulse:  [72-91]   Resp:  [16-18]   BP: ()/(47-67)   SpO2:  [95 %-99 %] .   Home meds for hypertension were reviewed and noted below.   Hypertension Medications             furosemide (LASIX) 40 MG tablet Take 1 tablet (40 mg total) by mouth once daily.    losartan (COZAAR) 25 MG tablet Take 1 tablet (25 mg total) by mouth once daily.    metoprolol succinate (TOPROL-XL) 25 MG 24 hr tablet Take 1 tablet (25 mg total) by mouth once daily.    nitroGLYCERIN (NITROSTAT) 0.4 MG SL tablet Place 1 tablet (0.4 mg total) under the tongue every 5 (five) minutes as needed for Chest pain.    spironolactone (ALDACTONE) 25 MG tablet Take 0.5 tablets (12.5 mg total) by mouth once daily.          While in the hospital, will manage blood pressure as follows; Continue home antihypertensive regimen    Will utilize p.r.n. blood pressure medication only if patient's blood pressure greater than  180/110 and she develops symptoms such as worsening chest pain or shortness of breath.

## 2023-03-01 NOTE — ASSESSMENT & PLAN NOTE
Now Hypotension.  Intermittent difficulties with hypotension while inpatient. Unable to tolerate full HFrEF GDMT.   Continued hypotension while admitted and symptomatic with ambulation. Will continue to titrate.     - Hold GDMT if SBP<95  - encourage PO intake   - Pt hypotensive AM of 3/1

## 2023-03-01 NOTE — PT/OT/SLP PROGRESS
Occupational Therapy   Treatment    Name: Odette Hart  MRN: [id number]  Admitting Diagnosis:  Colocutaneous fistula  23 Days Post-Op    Recommendations:     Discharge Recommendations: nursing facility, skilled  Discharge Equipment Recommendations:  none  Barriers to discharge:  Decreased caregiver support    Assessment:     Odette Hart is a 67 y.o. female with a medical diagnosis of Colocutaneous fistula. Had c/o SOB and dizziness while walking but 02 sats at 92% on RA. D/C rec changed to SNF due to lack of any support at home and pt is not at her prior independent level of functioning. Performance deficits affecting function are weakness, impaired endurance, impaired self care skills, impaired functional mobility, gait instability, impaired balance, decreased coordination, decreased safety awareness.     Rehab Prognosis:  Good; patient would benefit from acute skilled OT services to address these deficits and reach maximum level of function.       Plan:     Patient to be seen 3 x/week to address the above listed problems via self-care/home management, therapeutic activities, therapeutic exercises  Plan of Care Expires: 03/07/23  Plan of Care Reviewed with: patient    Subjective     Pain/Comfort:  Pain Rating 1: 0/10    Objective:     Communicated with: rn prior to session.  Patient found up in chair with   upon OT entry to room.    General Precautions: Standard, fall    Orthopedic Precautions:N/A  Braces: N/A  Respiratory Status: Room air     Occupational Performance:     Bed Mobility:    Up in chair.    Functional Mobility/Transfers:  Patient completed Sit <> Stand Transfer with contact guard assistance  with  rolling walker   Patient completed Toilet Transfer Step Transfer technique with contact guard assistance with  rolling walker  Functional Mobility: Pt walked 70' SBA with a RW - had c/o SOB and dizziness while walking.     Activities of Daily Living:  Feeding:  independence  Grooming: stand by  assistance standing at the sink.  Toileting: stand by assistance for pericare.    [facility] 6 Click ADL: 19    Treatment & Education:  Discussed OT POC and progress.  Encouraged ROM while up in the chair.    Patient left up in chair with all lines intact and call button in reach    GOALS:   Multidisciplinary Problems       Occupational Therapy Goals          Problem: Occupational Therapy    Goal Priority Disciplines Outcome Interventions   Occupational Therapy Goal     OT, PT/OT Ongoing, Progressing    Description: Goals to be met by: 2/28/2023    Patient will increase functional independence with ADLs by performing:    UE Dressing with Set-up Assistance.  LE Dressing with Setup.  Grooming while standing at sink with Modified [place].  Toileting from toilet with Modified [place] for hygiene and clothing management.   Supine to sit with Modified [place].  Stand pivot transfers with Modified [place].  Toilet transfer to toilet with Modified [place].                         Time Tracking:     OT Date of Treatment: 03/01/23  OT Start Time: 0859  OT Stop Time: 0922  OT Total Time (min): 23 min    Billable Minutes:Self Care/Home Management 13  Therapeutic Activity 10    OT/FROILAN: OT          3/1/2023

## 2023-03-01 NOTE — PT/OT/SLP PROGRESS
"Physical Therapy Treatment    Patient Name:  Odette Hart   MRN:  [id number]    Recommendations:     Discharge Recommendations: Home health PT  Discharge Equipment Recommendations: none  Barriers to discharge: Decreased caregiver support    Assessment:     Odette Hart is a 67 y.o. female admitted with a medical diagnosis of Colocutaneous fistula.  She presents with the following impairments/functional limitations: weakness, impaired endurance, impaired self care skills, impaired functional mobility, gait instability, impaired balance, decreased coordination. Pt was receptive and tolerated physical therapy treatment well. Pt shows progression with increase in gait distance this visit. Pt able to amb ~200 feet with RW and SBA, utilizing 5 standing rest breaks throughout gait trial. Pt would continue to benefit from acute skilled physical therapy services to improve functional mobility and activity tolerance.    Rehab Prognosis: Good; patient would benefit from acute skilled PT services to address these deficits and reach maximum level of function.    Recent Surgery: Procedure(s) (LRB):  REVISION, COLOSTOMY (N/A)  INSERTION, CATHETER, URETER, BILATERAL (Left)  CYSTOSCOPY, WITH URETERAL STENT INSERTION (Right)  DEBRIDEMENT, ABDOMEN (N/A)  COLECTOMY, PARTIAL  LYSIS, ADHESIONS (N/A) 23 Days Post-Op    Plan:     During this hospitalization, patient to be seen 3 x/week to address the identified rehab impairments via gait training, therapeutic activities, therapeutic exercises and progress toward the following goals:    Plan of Care Expires:  04/09/23    Subjective     Chief Complaint: dizziness  Patient/Family Comments/goals: "cracking my neck made me dizzy"  Pain/Comfort:  Pain Rating 1: 0/10  Pain Rating Post-Intervention 1: 0/10      Objective:     Communicated with nurse prior to session.  Patient found sitting edge of bed with  (ileostomy) upon PT entry to room.     General Precautions: Standard, " fall  Orthopedic Precautions: N/A  Braces: N/A  Respiratory Status: Room air     Functional Mobility:  Transfers:     Sit to Stand:  stand by assistance with no AD  Toilet Transfer: stand by assistance with  rolling walker  using  Stand Pivot  Gait: ~200' with RW and SBA.   Pt amb at slow steady gait, utilizing 5 standing rest breaks throughout gait trial.   Pt reported mild dizziness throughout gait trial but able to successfully complete  Balance: Static/dynamic standing balance: fair +, pt amb with RW and SBA without LOB.      AM-PAC 6 CLICK MOBILITY  Turning over in bed (including adjusting bedclothes, sheets and blankets)?: 4  Sitting down on and standing up from a chair with arms (e.g., wheelchair, bedside commode, etc.): 4  Moving from lying on back to sitting on the side of the bed?: 3  Moving to and from a bed to a chair (including a wheelchair)?: 4  Need to walk in hospital room?: 3  Climbing 3-5 steps with a railing?: 2  Basic Mobility Total Score: 20       Treatment & Education:  Discussed continued POC with pt who verbalized understanding. Educated pt on safety with mobility.    Patient left HOB elevated with all lines intact, call button in reach, and nurse present..    GOALS:   Multidisciplinary Problems       Physical Therapy Goals          Problem: Physical Therapy    Goal Priority Disciplines Outcome Goal Variances Interventions   Physical Therapy Goal     PT, PT/OT Ongoing, Progressing     Description: Goals to be met by: 2/15/2023, extended to     Patient will increase functional independence with mobility by performin. Gait  x 250 feet with Supervision using LRAD.   2. Ascend/descend 4 stair with right Handrails Supervision using no Assistive device.   3. Sit to Stand with Supervision using LRAD                       Time Tracking:     PT Received On: 23  PT Start Time: 1335     PT Stop Time: 1358  PT Total Time (min): 23 min     Billable Minutes: Gait Training 13,  Therapeutic Activity 10,     Treatment Type: Treatment  PT/PTA: PT     PTA Visit Number: 0     03/01/2023

## 2023-03-01 NOTE — SUBJECTIVE & OBJECTIVE
Interval History: No new complaints overnight, although MAPs < 65 at 4 am and 7 am. Labs pending. Ostomy functional. Discussed goals of care and completed the Serious Illness Conversation Guide with Ms. Hart yesterday afternoon and palliative care was consulted. They completed POA paperwork, but pt ultimately decided she is not interested in meeting with palliative care at this time.     Review of Systems   Constitutional:  Negative for chills and fever.   HENT:  Negative for sinus pain.    Eyes:  Negative for pain and visual disturbance.   Respiratory:  Negative for cough and shortness of breath.    Cardiovascular:  Negative for chest pain, palpitations and leg swelling.   Gastrointestinal:  Positive for abdominal pain. Negative for blood in stool, diarrhea and vomiting.   Genitourinary:  Positive for hematuria. Negative for difficulty urinating and dysuria.   Neurological:  Positive for weakness and light-headedness. Negative for dizziness.   Objective:     Vital Signs (Most Recent):  Temp: 96.5 °F (35.8 °C) (03/01/23 0711)  Pulse: 72 (03/01/23 0711)  Resp: 18 (03/01/23 0711)  BP: (!) 89/52 (03/01/23 0711)  SpO2: 97 % (03/01/23 0711)   Vital Signs (24h Range):  Temp:  [96.5 °F (35.8 °C)-98.7 °F (37.1 °C)] 96.5 °F (35.8 °C)  Pulse:  [72-91] 72  Resp:  [16-18] 18  SpO2:  [95 %-99 %] 97 %  BP: ()/(47-67) 89/52     Weight: 51.3 kg (113 lb)  Body mass index is 20.02 kg/m².    Intake/Output Summary (Last 24 hours) at 3/1/2023 0824  Last data filed at 3/1/2023 0738  Gross per 24 hour   Intake 1010 ml   Output 920 ml   Net 90 ml        Physical Exam  Vitals and nursing note reviewed.   Constitutional:       General: She is not in acute distress.     Appearance: She is ill-appearing.   HENT:      Head: Normocephalic and atraumatic.      Right Ear: External ear normal.      Left Ear: External ear normal.      Nose: No congestion or rhinorrhea.   Eyes:      General:         Right eye: No discharge.         Left eye:  No discharge.      Extraocular Movements: Extraocular movements intact.      Pupils: Pupils are equal, round, and reactive to light.   Cardiovascular:      Rate and Rhythm: Normal rate and regular rhythm.      Heart sounds: Normal heart sounds. No murmur heard.  Pulmonary:      Effort: No respiratory distress.      Breath sounds: No wheezing or rales.   Abdominal:      General: There is no distension.      Comments: Colostomy, vertical/midline abdominal excision, bandage clean/dry   Musculoskeletal:         General: No tenderness.      Cervical back: Normal range of motion. No rigidity.      Right lower leg: No edema.      Left lower leg: No edema.   Skin:     Findings: Bruising present. No erythema.   Neurological:      General: No focal deficit present.      Mental Status: She is alert and oriented to person, place, and time.   Psychiatric:         Mood and Affect: Mood normal.         Behavior: Behavior normal.       Significant Labs: All pertinent labs within the past 24 hours have been reviewed.  CBC:   No results for input(s): WBC, HGB, HCT, PLT in the last 48 hours.    CMP:   Recent Labs   Lab 02/28/23  0405   *   K 4.0   CL 94*   CO2 30*   GLU 95   BUN 18   CREATININE 0.7   CALCIUM 8.6*   ANIONGAP 7*     Magnesium:   Recent Labs   Lab 02/28/23  0405   MG 1.8         Significant Imaging: I have reviewed all pertinent imaging results/findings within the past 24 hours.

## 2023-03-01 NOTE — ASSESSMENT & PLAN NOTE
OR 2/6/23 revision colostomy (see op note)    -await return of bowel function, colostomy funcitonal  -continue multi modality for pain control  -encourage ambulation and use of IS  -phani hose and SCD  -prophalactic lovenox and PPI  -cont local wound care, abd wound with packing, due to location on lower abd and large abd pt can not perform wound care herself

## 2023-03-02 NOTE — PROGRESS NOTES
[facility]  Colorectal Surgery  Progress Note    Patient Name: Odette Hart  MRN: [id number]  Admission Date: 1/31/2023  Hospital Length of Stay: 30 days  Attending Physician: Rafa Cardozo MD    Subjective:     Interval History: no acute events overntie, eating better, adequate pain control, colostomy functional    Post-Op Info:  Procedure(s) (LRB):  REVISION, COLOSTOMY (N/A)  INSERTION, CATHETER, URETER, BILATERAL (Left)  CYSTOSCOPY, WITH URETERAL STENT INSERTION (Right)  DEBRIDEMENT, ABDOMEN (N/A)  COLECTOMY, PARTIAL  LYSIS, ADHESIONS (N/A)   24 Days Post-Op      Medications:  Continuous Infusions:  Scheduled Meds:   aspirin  81 mg Oral Daily    balsam peru-castor oiL   Topical (Top) BID    clopidogreL  75 mg Oral Daily    ezetimibe  10 mg Oral Daily    famotidine  20 mg Oral BID    furosemide  20 mg Oral Daily    metoprolol succinate  25 mg Oral Daily    moxifloxacin  400 mg Oral Daily    nystatin  500,000 Units Oral QID    polyethylene glycol  17 g Oral Daily     PRN Meds:   acetaminophen    albuterol    ALPRAZolam    dextrose 10%    dextrose 10%    fluticasone propionate    naloxone    nitroGLYCERIN    ondansetron    oxyCODONE    oxyCODONE    prochlorperazine    simethicone    sodium chloride 0.9%    sodium chloride 0.9%        Objective:     Vital Signs (Most Recent):  Temp: 97.6 °F (36.4 °C) (03/02/23 1120)  Pulse: 75 (03/02/23 1120)  Resp: 19 (03/02/23 1120)  BP: (!) 92/52 (03/02/23 1120)  SpO2: 97 % (03/02/23 1120)   Vital Signs (24h Range):  Temp:  [97 °F (36.1 °C)-97.7 °F (36.5 °C)] 97.6 °F (36.4 °C)  Pulse:  [68-84] 75  Resp:  [16-19] 19  SpO2:  [94 %-97 %] 97 %  BP: ()/(45-52) 92/52     Intake/Output - Last 3 Shifts         02/28 0700  03/01 0659 03/01 0700  03/02 0659 03/02 0700  03/03 0659    P.O. 810 200     Total Intake(mL/kg) 810 (15.8) 200 (3.9)     Urine (mL/kg/hr) 500 (0.4) 300 (0.2)     Drains  700     Stool 120 180 50    Total Output 620 1180 50     Net +190 -980 -50           Stool Occurrence 1 x 1 x             Physical Exam  Vitals and nursing note reviewed.   Constitutional:       Appearance: She is well-developed.   HENT:      Head: Normocephalic.   Eyes:      Pupils: Pupils are equal, round, and reactive to light.   Cardiovascular:      Rate and Rhythm: Normal rate and regular rhythm.      Heart sounds: Normal heart sounds.   Pulmonary:      Effort: Pulmonary effort is normal. No respiratory distress.      Breath sounds: Normal breath sounds. No wheezing or rales.   Abdominal:      Palpations: Abdomen is soft. There is no mass.      Tenderness: There is no guarding or rebound.      Comments: Abd wound packing in place  Colostomy funtional   Skin:     General: Skin is warm and dry.   Neurological:      Mental Status: She is alert and oriented to person, place, and time.           Significant Labs:  BMP (Last 3 Results):   Recent Labs   Lab 02/26/23  0628 02/27/23  0307 02/28/23  0405 03/01/23  0833   * 123* 95 130*   * 128* 131* 132*   K 4.7 3.6 4.0 4.0   CL 92* 91* 94* 95   CO2 24 30* 30* 31*   BUN 17 19 18 13   CREATININE 0.7 0.7 0.7 0.7   CALCIUM 8.6* 8.5* 8.6* 8.9   MG 2.0 1.8 1.8  --      CBC (Last 3 Results):   Recent Labs   Lab 02/24/23  0613 02/27/23  0307 03/01/23  0833   WBC 2.64* 4.68 4.34   RBC 3.93* 3.86* 4.60   HGB 9.3* 9.2* 10.7*   HCT 31.3* 30.7* 36.0*    334 338   MCV 80* 80* 78*   MCH 23.7* 23.8* 23.3*   MCHC 29.7* 30.0* 29.7*       Significant Diagnostics:  None    Assessment/Plan:     * Colocutaneous fistula  OR 2/6/23 revision colostomy (see op note)    -await return of bowel function, colostomy funcitonal  -continue multi modality for pain control  -encourage ambulation and use of IS  -phani hose and SCD  -prophalactic lovenox and PPI  -cont local wound care, abd wound with packing, due to location on lower abd and large abd pt can not perform wound care herself  -plan to discharage home in am    Ileus  Post op  ileus not surgical complication but expected outcome  Ileus now resolved       Severe protein-calorie malnutrition  Nutrition consulted. Most recent weight and BMI monitored-     Malnutrition Type and Energy Intake  Malnutrition Type: acute illness or injury    Malnutrition (Moderate to Severe)  Weight Loss (Malnutrition): greater than 5% in 1 month  Energy Intake (Malnutrition): less than or equal to 50% for greater than or equal to 5 days    Final Summary  Subcutaneous Fat Loss (Final Summary): moderate protein-calorie malnutrition  Muscle Loss Evaluation (Final Summary): moderate protein-calorie malnutrition    Malnutrition Final Summary  Severe Weight Loss (Malnutrition): greater than 5% in 1 month    Measurements:  Wt Readings from Last 1 Encounters:   02/26/23 51.3 kg (113 lb)   Body mass index is 20.02 kg/m².    Recommendations: Recommendation/Intervention: 1.) Recommend continuing with regular diet as tolerated, encouraging PO intake and honoring food preferences. 2.) Recommend continuing Boost plus TID, chilled as per pt's preferences, to help meet EEN/EPN. 3.) RD to monitor.  Goals: meets % een/epn by next rd f/u    Patient has been screened and assessed by RD. RD will follow patient.      Hyponatremia  Monitor labs  Appreciate medicine input    Hypertension  Chronic, controlled.  Latest blood pressure and vitals reviewed-   Temp:  [97 °F (36.1 °C)-97.7 °F (36.5 °C)]   Pulse:  [68-84]   Resp:  [16-19]   BP: ()/(45-52)   SpO2:  [94 %-97 %] .   Home meds for hypertension were reviewed and noted below.   Hypertension Medications             furosemide (LASIX) 40 MG tablet Take 1 tablet (40 mg total) by mouth once daily.    losartan (COZAAR) 25 MG tablet Take 1 tablet (25 mg total) by mouth once daily.    metoprolol succinate (TOPROL-XL) 25 MG 24 hr tablet Take 1 tablet (25 mg total) by mouth once daily.    nitroGLYCERIN (NITROSTAT) 0.4 MG SL tablet Place 1 tablet (0.4 mg total) under the tongue  every 5 (five) minutes as needed for Chest pain.    spironolactone (ALDACTONE) 25 MG tablet Take 0.5 tablets (12.5 mg total) by mouth once daily.          While in the hospital, will manage blood pressure as follows; Continue home antihypertensive regimen    Will utilize p.r.n. blood pressure medication only if patient's blood pressure greater than  180/110 and she develops symptoms such as worsening chest pain or shortness of breath.    Coronary artery disease  Cont home meds  appreciate medicine input    Automatic implantable cardioverter-defibrillator in situ  Cont tele    Ischemic cardiomyopathy  Cont home meds  appeciate medicine assistance with multiple medical problems  Monitor vs  monitior labs          Carola Buckley NP  Colorectal Surgery  [facility]

## 2023-03-02 NOTE — PLAN OF CARE
Pt AAOx4, VSS on RA. Pain managed with scheduled and PRN meds. Midline incision, dressing changes by MD. Sacral wound with mepilex. Ostomy with 100 cc output. Regular diet, tolerating well. Up to BSC and purewick in place, adequate UOP. Call bell within reach. Frequent rounds for safety.     Problem: Adult Inpatient Plan of Care  Goal: Plan of Care Review  Outcome: Ongoing, Progressing  Goal: Patient-Specific Goal (Individualized)  Outcome: Ongoing, Progressing  Goal: Absence of Hospital-Acquired Illness or Injury  Outcome: Ongoing, Progressing  Goal: Optimal Comfort and Wellbeing  Outcome: Ongoing, Progressing  Goal: Readiness for Transition of Care  Outcome: Ongoing, Progressing     Problem: Fall Injury Risk  Goal: Absence of Fall and Fall-Related Injury  Outcome: Ongoing, Progressing

## 2023-03-02 NOTE — ASSESSMENT & PLAN NOTE
Chronic, controlled.  Latest blood pressure and vitals reviewed-   Temp:  [97 °F (36.1 °C)-97.7 °F (36.5 °C)]   Pulse:  [68-84]   Resp:  [16-19]   BP: ()/(45-52)   SpO2:  [94 %-97 %] .   Home meds for hypertension were reviewed and noted below.   Hypertension Medications             furosemide (LASIX) 40 MG tablet Take 1 tablet (40 mg total) by mouth once daily.    losartan (COZAAR) 25 MG tablet Take 1 tablet (25 mg total) by mouth once daily.    metoprolol succinate (TOPROL-XL) 25 MG 24 hr tablet Take 1 tablet (25 mg total) by mouth once daily.    nitroGLYCERIN (NITROSTAT) 0.4 MG SL tablet Place 1 tablet (0.4 mg total) under the tongue every 5 (five) minutes as needed for Chest pain.    spironolactone (ALDACTONE) 25 MG tablet Take 0.5 tablets (12.5 mg total) by mouth once daily.          While in the hospital, will manage blood pressure as follows; Continue home antihypertensive regimen    Will utilize p.r.n. blood pressure medication only if patient's blood pressure greater than  180/110 and she develops symptoms such as worsening chest pain or shortness of breath.

## 2023-03-02 NOTE — PLAN OF CARE
[facility]  Discharge Reassessment    Primary Care Provider: Braxton Barragan MD    Expected Discharge Date: 3/6/2023    Reassessment (most recent)       Discharge Reassessment - 03/02/23 1237          Discharge Reassessment    Assessment Type Discharge Planning Reassessment     Did the patient's condition or plan change since previous assessment? No     Discharge Plan discussed with: Patient     Communicated SIVAN with patient/caregiver Yes     Discharge Plan A Home Health     DME Needed Upon Discharge  none     Discharge Barriers Identified None        Post-Acute Status    Post-Acute Authorization Home Health     Home Health Status Set-up Complete/Auth obtained     Coverage [facility]     Hospital Resources/Appts/Education Provided Provided patient/caregiver with written discharge plan information     Patient choice form signed by patient/caregiver List with quality metrics by geographic area provided     Discharge Delays None known at this time                     Pt not ready for discharge due to: Not medically ready  SW will remain available for families in [facility].  Currently pt has d/c plans in progress at this time.    Pt to D/C home with Home health, ready responders,and Care at Home. Pt is agreeable to d/c plan at this time. [facility] to begin services on 3/4/23.    Marbella López LCSW  Case Management/[facility]  [phone]

## 2023-03-02 NOTE — ASSESSMENT & PLAN NOTE
OR 2/6/23 revision colostomy (see op note)    -await return of bowel function, colostomy funcitonal  -continue multi modality for pain control  -encourage ambulation and use of IS  -phani hose and SCD  -prophalactic lovenox and PPI  -cont local wound care, abd wound with packing, due to location on lower abd and large abd pt can not perform wound care herself  -plan to discharage home in am

## 2023-03-02 NOTE — CONSULTS
[facility]  Wound Care    Patient Name:  Odette Hart   MRN:  [id number]  Date: 3/1/2023  Diagnosis: Colocutaneous fistula    History:     Past Medical History:   Diagnosis Date    Acute coronary syndrome     Acute exacerbation of chronic obstructive pulmonary disease (COPD) 3/23/2022    Allergy     Arthritis     Cardiomyopathy     CHF (congestive heart failure)     Coronary artery disease     Coronary artery disease of native artery of native heart with stable angina pectoris 4/19/2021 1/24/2022: [facility]: Cath: LAD: Proximal stent patent. LCX: Proximal 80%. LCX: Dominant. Moderate disease. LCX: HEVER 2.75 x 18 mm. Distal dissection. HEVER 2.75 x 22 mm.     Diverticulitis     Diverticulosis     Familial hypercholesterolemia 1/22/2022    Former smoker 1/24/2022    Heart attack     Heart disease     History of myocardial infarction 1/24/2022    Hyperlipidemia     Hypertension     Hypertension 1/24/2022    ICD (implantable cardioverter-defibrillator) in place     Non-ST elevation myocardial infarction (NSTEMI) 2/4/2019       Social History     Socioeconomic History    Marital status:    Tobacco Use    Smoking status: Former     Packs/day: 0.50     Types: Cigarettes     Start date: 2/4/1976     Quit date: 12/4/2012     Years since quitting: 10.2    Smokeless tobacco: Never   Substance and Sexual Activity    Alcohol use: No    Drug use: No     Social Determinants of Health     Financial Resource Strain: Low Risk     Difficulty of Paying Living Expenses: Not hard at all   Food Insecurity: No Food Insecurity    Worried About Running Out of Food in the Last Year: Never true    Ran Out of Food in the Last Year: Never true   Transportation Needs: No Transportation Needs    Lack of Transportation (Medical): No    Lack of Transportation (Non-Medical): No   Physical Activity: Inactive    Days of Exercise per Week: 0 days    Minutes of Exercise per Session: 0 min   Stress: No Stress Concern Present    Feeling of  Stress : Not at all   Social Connections: Moderately Integrated    Frequency of Communication with Friends and Family: More than three times a week    Frequency of Social Gatherings with Friends and Family: More than three times a week    Attends [employer] Services: 1 to 4 times per year    Active Member of Clubs or Organizations: Yes    Attends Club or Organization Meetings: 1 to 4 times per year    Marital Status:    Housing Stability: Low Risk     Unable to Pay for Housing in the Last Year: No    Number of Places Lived in the Last Year: 1    Unstable Housing in the Last Year: No       Precautions:     Allergies as of 01/31/2023 - Reviewed 01/31/2023   Allergen Reaction Noted    Augmentin [amoxicillin-pot clavulanate] Swelling 02/19/2018    Lisinopril Other (See Comments) 02/19/2018    Shellfish containing products Anaphylaxis 09/10/2018    Levaquin [levofloxacin] Other (See Comments) 09/18/2018    Clindamycin Palpitations 01/21/2019       [facility] Assessment Details/Treatment     Patient seen for follow up. Patient has midline abdominal incision which MD has been managing. Patient reports MD has just changed the dressing and she does not want it changed it again due to pain. Her ostomy pouch has also recently been changed so no need to change at this time. Discussed care of colostomy with patient and she was able to care for her stoma previously. She does not feel well enough to do so at this time but feels she can when feeling better. Will follow up with patient tomorrow.   Nursing to continue care.     03/01/2023

## 2023-03-02 NOTE — ASSESSMENT & PLAN NOTE
Nutrition consulted. Most recent weight and BMI monitored-     Malnutrition Type and Energy Intake  Malnutrition Type: acute illness or injury    Malnutrition (Moderate to Severe)  Weight Loss (Malnutrition): greater than 5% in 1 month  Energy Intake (Malnutrition): less than or equal to 50% for greater than or equal to 5 days    Final Summary  Subcutaneous Fat Loss (Final Summary): moderate protein-calorie malnutrition  Muscle Loss Evaluation (Final Summary): moderate protein-calorie malnutrition    Malnutrition Final Summary  Severe Weight Loss (Malnutrition): greater than 5% in 1 month    Measurements:  Wt Readings from Last 1 Encounters:   02/26/23 51.3 kg (113 lb)   Body mass index is 20.02 kg/m².    Recommendations: Recommendation/Intervention: 1.) Recommend continuing with regular diet as tolerated, encouraging PO intake and honoring food preferences. 2.) Recommend continuing Boost plus TID, chilled as per pt's preferences, to help meet EEN/EPN. 3.) RD to monitor.  Goals: meets % een/epn by next rd f/u    Patient has been screened and assessed by RD. RD will follow patient.

## 2023-03-02 NOTE — SUBJECTIVE & OBJECTIVE
Subjective:     Interval History: no acute events overntie, eating better, adequate pain control, colostomy functional    Post-Op Info:  Procedure(s) (LRB):  REVISION, COLOSTOMY (N/A)  INSERTION, CATHETER, URETER, BILATERAL (Left)  CYSTOSCOPY, WITH URETERAL STENT INSERTION (Right)  DEBRIDEMENT, ABDOMEN (N/A)  COLECTOMY, PARTIAL  LYSIS, ADHESIONS (N/A)   24 Days Post-Op      Medications:  Continuous Infusions:  Scheduled Meds:   aspirin  81 mg Oral Daily    balsam peru-castor oiL   Topical (Top) BID    clopidogreL  75 mg Oral Daily    ezetimibe  10 mg Oral Daily    famotidine  20 mg Oral BID    furosemide  20 mg Oral Daily    metoprolol succinate  25 mg Oral Daily    moxifloxacin  400 mg Oral Daily    nystatin  500,000 Units Oral QID    polyethylene glycol  17 g Oral Daily     PRN Meds:   acetaminophen    albuterol    ALPRAZolam    dextrose 10%    dextrose 10%    fluticasone propionate    naloxone    nitroGLYCERIN    ondansetron    oxyCODONE    oxyCODONE    prochlorperazine    simethicone    sodium chloride 0.9%    sodium chloride 0.9%        Objective:     Vital Signs (Most Recent):  Temp: 97.6 °F (36.4 °C) (03/02/23 1120)  Pulse: 75 (03/02/23 1120)  Resp: 19 (03/02/23 1120)  BP: (!) 92/52 (03/02/23 1120)  SpO2: 97 % (03/02/23 1120)   Vital Signs (24h Range):  Temp:  [97 °F (36.1 °C)-97.7 °F (36.5 °C)] 97.6 °F (36.4 °C)  Pulse:  [68-84] 75  Resp:  [16-19] 19  SpO2:  [94 %-97 %] 97 %  BP: ()/(45-52) 92/52     Intake/Output - Last 3 Shifts         02/28 0700  03/01 0659 03/01 0700  03/02 0659 03/02 0700  03/03 0659    P.O. 810 200     Total Intake(mL/kg) 810 (15.8) 200 (3.9)     Urine (mL/kg/hr) 500 (0.4) 300 (0.2)     Drains  700     Stool 120 180 50    Total Output 620 1180 50    Net +190 -980 -50           Stool Occurrence 1 x 1 x             Physical Exam  Vitals and nursing note reviewed.   Constitutional:       Appearance: She is well-developed.   HENT:      Head: Normocephalic.   Eyes:      Pupils:  Pupils are equal, round, and reactive to light.   Cardiovascular:      Rate and Rhythm: Normal rate and regular rhythm.      Heart sounds: Normal heart sounds.   Pulmonary:      Effort: Pulmonary effort is normal. No respiratory distress.      Breath sounds: Normal breath sounds. No wheezing or rales.   Abdominal:      Palpations: Abdomen is soft. There is no mass.      Tenderness: There is no guarding or rebound.      Comments: Abd wound packing in place  Colostomy funtional   Skin:     General: Skin is warm and dry.   Neurological:      Mental Status: She is alert and oriented to person, place, and time.           Significant Labs:  BMP (Last 3 Results):   Recent Labs   Lab 02/26/23  0628 02/27/23  0307 02/28/23  0405 03/01/23  0833   * 123* 95 130*   * 128* 131* 132*   K 4.7 3.6 4.0 4.0   CL 92* 91* 94* 95   CO2 24 30* 30* 31*   BUN 17 19 18 13   CREATININE 0.7 0.7 0.7 0.7   CALCIUM 8.6* 8.5* 8.6* 8.9   MG 2.0 1.8 1.8  --      CBC (Last 3 Results):   Recent Labs   Lab 02/24/23  0613 02/27/23  0307 03/01/23  0833   WBC 2.64* 4.68 4.34   RBC 3.93* 3.86* 4.60   HGB 9.3* 9.2* 10.7*   HCT 31.3* 30.7* 36.0*    334 338   MCV 80* 80* 78*   MCH 23.7* 23.8* 23.3*   MCHC 29.7* 30.0* 29.7*       Significant Diagnostics:  None

## 2023-03-03 NOTE — SUBJECTIVE & OBJECTIVE
Subjective:     Interval History: no acute events overntie, eating better, adequate pain control, colostomy functional    Post-Op Info:  Procedure(s) (LRB):  REVISION, COLOSTOMY (N/A)  INSERTION, CATHETER, URETER, BILATERAL (Left)  CYSTOSCOPY, WITH URETERAL STENT INSERTION (Right)  DEBRIDEMENT, ABDOMEN (N/A)  COLECTOMY, PARTIAL  LYSIS, ADHESIONS (N/A)   25 Days Post-Op      Medications:  Continuous Infusions:  Scheduled Meds:   aspirin  81 mg Oral Daily    balsam peru-castor oiL   Topical (Top) BID    clopidogreL  75 mg Oral Daily    ezetimibe  10 mg Oral Daily    famotidine  20 mg Oral BID    furosemide  20 mg Oral Daily    metoprolol succinate  25 mg Oral Daily    moxifloxacin  400 mg Oral Daily    polyethylene glycol  17 g Oral Daily     PRN Meds:   acetaminophen    albuterol    ALPRAZolam    dextrose 10%    dextrose 10%    fluticasone propionate    naloxone    nitroGLYCERIN    ondansetron    oxyCODONE    oxyCODONE    prochlorperazine    simethicone    sodium chloride 0.9%    sodium chloride 0.9%        Objective:     Vital Signs (Most Recent):  Temp: 97.3 °F (36.3 °C) (03/03/23 0443)  Pulse: 86 (03/03/23 0443)  Resp: 18 (03/03/23 0536)  BP: (!) 110/58 (03/03/23 0443)  SpO2: 100 % (03/03/23 0443)   Vital Signs (24h Range):  Temp:  [97.3 °F (36.3 °C)-97.6 °F (36.4 °C)] 97.3 °F (36.3 °C)  Pulse:  [69-86] 86  Resp:  [14-20] 18  SpO2:  [96 %-100 %] 100 %  BP: ()/(51-58) 110/58     Intake/Output - Last 3 Shifts         03/01 0700  03/02 0659 03/02 0700  03/03 0659 03/03 0700  03/04 0659    P.O. 200      Total Intake(mL/kg) 200 (3.9)      Urine (mL/kg/hr) 300 (0.2) 1100 (0.9)     Drains 700      Stool 180 100     Total Output 1180 1200     Net -980 -1200            Stool Occurrence 1 x 0 x             Physical Exam  Vitals and nursing note reviewed.   Constitutional:       Appearance: She is well-developed.   HENT:      Head: Normocephalic.   Eyes:      Pupils: Pupils are equal, round, and reactive to light.    Cardiovascular:      Rate and Rhythm: Normal rate and regular rhythm.      Heart sounds: Normal heart sounds.   Pulmonary:      Effort: Pulmonary effort is normal. No respiratory distress.      Breath sounds: Normal breath sounds. No wheezing or rales.   Abdominal:      Palpations: Abdomen is soft. There is no mass.      Tenderness: There is no guarding or rebound.      Comments: Abd wound packing in place  Colostomy funtional   Skin:     General: Skin is warm and dry.   Neurological:      Mental Status: She is alert and oriented to person, place, and time.           Significant Labs:  BMP (Last 3 Results):   Recent Labs   Lab 02/27/23  0307 02/28/23  0405 03/01/23  0833 03/03/23  0300   * 95 130* 90   * 131* 132* 136   K 3.6 4.0 4.0 3.7   CL 91* 94* 95 99   CO2 30* 30* 31* 32*   BUN 19 18 13 13   CREATININE 0.7 0.7 0.7 0.6   CALCIUM 8.5* 8.6* 8.9 8.7   MG 1.8 1.8  --  1.9       CBC (Last 3 Results):   Recent Labs   Lab 02/27/23  0307 03/01/23  0833   WBC 4.68 4.34   RBC 3.86* 4.60   HGB 9.2* 10.7*   HCT 30.7* 36.0*    338   MCV 80* 78*   MCH 23.8* 23.3*   MCHC 30.0* 29.7*         Significant Diagnostics:  None

## 2023-03-03 NOTE — PLAN OF CARE
Problem: Occupational Therapy  Goal: Occupational Therapy Goal  Description: Goals to be met by: 3/10/2023    Patient will increase functional independence with ADLs by performing:    UE Dressing with Set-up Assistance.  LE Dressing with Setup.  Grooming while standing at sink with Modified [place]  Toileting from toilet with Modified [place] for hygiene and clothing management.   Supine to sit with Modified [place].  Stand pivot transfers with Modified [place].  Toilet transfer to toilet with Modified [place].    Outcome: Ongoing, Progressing   3/3/2023  ANN Gardner/SHANNON

## 2023-03-03 NOTE — PLAN OF CARE
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      Hypercholesterolemia [E78.00]  Yes    Coronary artery disease [I25.10]  Yes     2012: [place]: Anterior MI. LAD stent.  1/21/2022: NSTEMI. Troponin 9.  1/24/2022: [facility]: Cath: LAD: Proximal stent patent. LCX: Proximal 80%. LCX: Dominant. Moderate disease. LCX: HEVER 2.75 x 18 mm. Distal dissection. HEVER 2.75 x 22 mm.        Ischemic cardiomyopathy [I25.5]  Yes     1/24/2022: Echo: Severely enlarged left ventricle with severe systolic dysfunction. EF 20%.         Automatic implantable cardioverter-defibrillator in situ [Z95.810]  Yes     2014: [employer] ICD.           Resolved Hospital Problems    Diagnosis Date Resolved POA    Acute cystitis with hematuria [N30.01] 02/17/2023 Unknown    Preop examination [Z01.818] 02/06/2023 Not Applicable       Patient is homebound due to:  Colocutaneous fistula    Allergies:  Review of patient's allergies indicates:   Allergen Reactions    Augmentin [amoxicillin-pot clavulanate] Swelling     Not allergic to amoxicillin just a derivative of augmentin    Lisinopril Other (See Comments)     Fluid around heart    Shellfish containing products Anaphylaxis     Pt.states she is allergic to SEAFOOD since the age of 13    Levaquin [levofloxacin] Other (See Comments)     Very bad joint pain    Clindamycin Palpitations      Chest pain       Vitals:  Every shift    Diet: regular diet    Activities:   Up in a chair each morning as tolerated, Ambulate with assistance to bathroom, and Activity as tolerated    Goals of Care Treatment Preferences:  Code Status: Full Code        What is most important right now is to focus on spending time at home, avoiding the hospital, remaining as independent as possible, symptom/pain control, improvement in condition but with limits to invasive therapies.  Accordingly, we have decided that the best plan to meet the patient's goals includes  discussions and palliative care consult. Patient is also interested in completing a living will and POA paperwork..      Labs:  weekly CBC, BMP, prealb    Nursing Precautions:  Pressure ulcer prevention    Consults:   PT to evaluate and treat- 5 times a week, OT to evaluate and treat- 5 times a week, Wound Care, and Nutrition to evaluate and recommend diet     Miscellaneous Care: Colostomy Care:  Empty bag every shift and prn  CHF Care: Daily Weight with notification of MD/NP of 2lb or > increase in 24 hours    v/s and O2 sat every shift    Oxygen as needed for sats <90%    Report abnormal breath sounds to MD/NP    Edema checks q shift- notify MD/NP of increased edema    Task segmentation by nursing for daily care to decrease exertion    CHF education to include diet ,medication, and CHF flags for MD notification                   Wound care:  Irrigate wound with normal saline and Pack abd wound with iodofoam bid    Medications: Discontinue all previous medication orders, if any. See new list below.     Medication List        START taking these medications      albuterol 90 mcg/actuation inhaler  Commonly known as: PROVENTIL/VENTOLIN HFA  Inhale 2 puffs into the lungs every 6 (six) hours as needed for Wheezing. Rescue     moxifloxacin 400 mg tablet  Commonly known as: AVELOX  Take 1 tablet (400 mg total) by mouth once daily for 14 days     oxyCODONE 5 MG  immediate release tablet  Commonly known as: ROXICODONE  Take 1 tablet (5 mg total) by mouth every 4 (four) hours as needed for Pain.     polyethylene glycol 17 gram Pwpk  Commonly known as: GLYCOLAX  Take 17 g by mouth once daily.  Start taking on: March 4, 2023            CHANGE how you take these medications      furosemide 20 MG tablet  Commonly known as: LASIX  Take 1 tablet (20 mg total) by mouth once daily.  Start taking on: March 4, 2023  What changed:   medication strength  how much to take            CONTINUE taking these medications      acetaminophen 500 MG tablet  Commonly known as: TYLENOL  Take 2 tablets (1,000 mg total) by mouth every 8 (eight) hours as needed for Pain.     ALPRAZolam 0.5 MG tablet  Commonly known as: XANAX  Take 1 tablet (0.5 mg total) by mouth nightly as needed for Anxiety.     aspirin 81 MG EC tablet  Commonly known as: ECOTRIN  Take 1 tablet (81 mg total) by mouth once daily.     clopidogreL 75 mg tablet  Commonly known as: PLAVIX  Take 1 tablet (75 mg total) by mouth once daily.     empagliflozin 10 mg tablet  Commonly known as: JARDIANCE  Take 1 tablet (10 mg total) by mouth once daily.     ENSURE HIGH PROTEIN Liqd  Generic drug: food supplemt, lactose-reduced  Take 240 mLs by mouth 2 (two) times a day.     ezetimibe 10 mg tablet  Commonly known as: ZETIA  Take 1 tablet (10 mg total) by mouth once daily.     famotidine 20 MG tablet  Commonly known as: PEPCID  Take 1 tablet (20 mg total) by mouth once daily.     fluticasone propionate 50 mcg/actuation nasal spray  Commonly known as: FLONASE  1 spray (50 mcg total) by Each Nostril route once daily.     metoprolol succinate 25 MG 24 hr tablet  Commonly known as: TOPROL-XL  Take 1 tablet (25 mg total) by mouth once daily.     simethicone 80 MG chewable tablet  Commonly known as: MYLICON  Take 1 tablet (80 mg total) by mouth every 8 (eight) hours as needed for Flatulence.            STOP taking these medications      docusate sodium  100 MG capsule  Commonly known as: COLACE     HYDROcodone-acetaminophen 5-325 mg per tablet  Commonly known as: NORCO     losartan 25 MG tablet  Commonly known as: COZAAR     ondansetron 8 MG Tbdl  Commonly known as: ZOFRAN-ODT     spironolactone 25 MG tablet  Commonly known as: ALDACTONE            ASK your doctor about these medications      nitroGLYCERIN 0.4 MG SL tablet  Commonly known as: NITROSTAT  Place 1 tablet (0.4 mg total) under the tongue every 5 (five) minutes as needed for Chest pain.                Immunizations Administered as of 3/3/2023       Name Date Dose VIS Date Route Exp Date    COVID-19, MRNA, LN-S, PF (Moderna) 8/10/2021 -- -- Intramuscular --    Site: Left arm     Lot: [id number]                    _________________________________  LITA DUBON MD  03/03/2023

## 2023-03-03 NOTE — PLAN OF CARE
[facility] - [facility]  Discharge Reassessment    Primary Care Provider: Braxton Barragan MD    Expected Discharge Date: 3/3/2023    SW spoke with MD who stated she spoke with patient and patient's son Telly Hart who both are in agreement for SNF placement upon discharge.  SW spoke to Krys with [facility] who stated son stated he can visit facility Monday and sign documents if he is ok with facility.  Per Krys they can accept patient tomorrow if patient's son could come sign documents today and/or tomorrow.  Per MD, she is ok with patient discharging tomorrow and/or Monday if necessary.  Patient would require new auth date if discharge to facility Monday.  Locet called in to [facility] [phone] and faxed [facility] to [phone] for state approval.  Fax confirmation received.    Reassessment (most recent)       Discharge Reassessment - 03/03/23 1524          Discharge Reassessment    Assessment Type Discharge Planning Reassessment     Did the patient's condition or plan change since previous assessment? Yes     Discharge Plan discussed with: Patient     Communicated SIVAN with patient/caregiver Yes     Discharge Plan A Skilled Nursing Facility     Discharge Plan B Home Health     DME Needed Upon Discharge  none     Discharge Barriers Identified None     Why the patient remains in the hospital Placement issues        Post-Acute Status    Post-Acute Authorization Placement     Post-Acute Placement Status Set-up Complete/Auth obtained

## 2023-03-03 NOTE — PLAN OF CARE
ADAN spoke with [facility] regarding patient discharge tomorrow.  Per [facility], they can see patient Sun 3 times a week.  ADAN sent secure message to update Patient Care Team and requested wound care orders for clinic if patient requires daily wound care.         03/03/23 1003   Post-Acute Status   Post-Acute Authorization [facility];Other   Other Status Community Services   Discharge Delays Orders Needed

## 2023-03-03 NOTE — PT/OT/SLP PROGRESS
Physical Therapy Treatment    Patient Name:  Odette Hart   MRN:  [id number]    Recommendations:     Discharge Recommendations: home health PT  Discharge Equipment Recommendations: none  Barriers to discharge: None    Assessment:     Odette Hart is a 67 y.o. female admitted with a medical diagnosis of Colocutaneous fistula.  She presents with the following impairments/functional limitations: impaired endurance, impaired self care skills, impaired functional mobility, impaired skin Pt. cooperative and tolerated treatment well. Pt. progressing with mobility.    Rehab Prognosis: Good; patient would benefit from acute skilled PT services to address these deficits and reach maximum level of function.    Recent Surgery: Procedure(s) (LRB):  REVISION, COLOSTOMY (N/A)  INSERTION, CATHETER, URETER, BILATERAL (Left)  CYSTOSCOPY, WITH URETERAL STENT INSERTION (Right)  DEBRIDEMENT, ABDOMEN (N/A)  COLECTOMY, PARTIAL  LYSIS, ADHESIONS (N/A) 25 Days Post-Op    Plan:     During this hospitalization, patient to be seen 3 x/week to address the identified rehab impairments via gait training, therapeutic activities, therapeutic exercises and progress toward the following goals:    Plan of Care Expires:  04/09/23    Subjective     Chief Complaint: no new c/o  Patient/Family Comments/goals: pt. Agreeable to PT  Pain/Comfort:  Pain Rating 1:  (pt. did not rate)      Objective:     Communicated with nursing prior to session.  Patient found up in chair with peripheral IV (ileostomy) upon PT entry to room.     General Precautions: Standard, fall  Orthopedic Precautions: N/A  Braces: N/A  Respiratory Status: Room air     Functional Mobility:  Transfers:     Sit to Stand:  supervision with no AD  Bed to Chair: supervision with  no AD  using  Stand Pivot  Gait: 260' and 60' with RW and Supervision with x4 standing rest breaks and seated rest break between gait trials. Pt. amb. with slow, steady gait without LOB.  Balance:  fair+      AM-PAC 6 CLICK MOBILITY  Turning over in bed (including adjusting bedclothes, sheets and blankets)?: 4  Sitting down on and standing up from a chair with arms (e.g., wheelchair, bedside commode, etc.): 4  Moving from lying on back to sitting on the side of the bed?: 4  Moving to and from a bed to a chair (including a wheelchair)?: 4  Need to walk in hospital room?: 3  Climbing 3-5 steps with a railing?: 3  Basic Mobility Total Score: 22       Treatment & Education:  Discussed pt.'s progress, goals, and POC.    Patient left up in chair with all lines intact and call button in reach..    GOALS:   Multidisciplinary Problems       Physical Therapy Goals          Problem: Physical Therapy    Goal Priority Disciplines Outcome Goal Variances Interventions   Physical Therapy Goal     PT, PT/OT Ongoing, Progressing     Description: Goals to be met by: 2/15/2023, extended to 3/1/2023, extended to 3/15/2023    Patient will increase functional independence with mobility by performin. Gait  x 250 feet with Supervision using LRAD.   2. Ascend/descend 4 stair with right Handrails Supervision using no Assistive device.   3. Sit to Stand with Supervision using LRAD                       Time Tracking:     PT Received On: 23  PT Start Time: 1358     PT Stop Time: 1427  PT Total Time (min): 29 min     Billable Minutes: Gait Training 29    Treatment Type: Treatment  PT/PTA: PT     PTA Visit Number: 0     2023

## 2023-03-03 NOTE — PT/OT/SLP PROGRESS
"Occupational Therapy   Treatment    Name: Odette Hart  MRN: [id number]  Admitting Diagnosis:  Colocutaneous fistula  25 Days Post-Op    Recommendations:     Discharge Recommendations: home health OT  Discharge Equipment Recommendations:  none  Barriers to discharge:       Assessment:     Odette Hart is a 67 y.o. female with a medical diagnosis of Colocutaneous fistula.  Pt seated up in chair upon arrival with Pt agreeable to therapy. Pt c/o of abdominal 6/10 pain this date, with nurse notified. Pt previously worked with PT wanting to focus session on ADLs. Pt required Sup-SBA for  functional mobility and toilet t/fs. At  this time, Pt is performing below baseline but is progressing well towards goals limited this date 2/2 by pain.  She presents with deficits listed below. Performance deficits affecting function are impaired endurance, impaired functional mobility, gait instability, pain, impaired self care skills.     Rehab Prognosis:  Good; patient would benefit from acute skilled OT services to address these deficits and reach maximum level of function.       Plan:     Patient to be seen 3 x/week to address the above listed problems via self-care/home management, therapeutic activities, therapeutic exercises  Plan of Care Expires: 03/07/23  Plan of Care Reviewed with: patient    Subjective   " I already walked with Wesley (PT), but I do have to pee and I could freshen up"    Pain/Comfort:  Pain Rating 1: 6/10  Location - Orientation 1: generalized  Location 1: abdomen  Pain Addressed 1: Nurse notified, Distraction, Reposition  Pain Rating Post-Intervention 1: 5/10    Objective:     Communicated with: Nurse De Leon prior to session.  Patient found up in chair with Other (comments) (ileostomy & Avasys) upon OT entry to room.    General Precautions: Standard, fall    Orthopedic Precautions:N/A  Braces: N/A  Respiratory Status: Room air     Occupational Performance:     Bed Mobility:    No bed mobility " observed Pt up in chair and returned to chair.     Functional Mobility/Transfers:  Patient completed Sit <> Stand Transfer with contact guard assistance  with  rolling walker   Patient completed Toilet Transfer Step Transfer technique with stand by assistance with  rolling walker  Functional Mobility: Pt walked ~12' within room with SBA and RW and no noted LOB.     Activities of Daily Living:  Grooming: stand by assistance while standing at the sink  Toileting: supervision for clothes management and toilet hygiene      [facility] 6 Click ADL: 20    Treatment & Education:  Role of OT and OT POC  BUE Exercises to increase strength/ROM (5x shoulder flex, 10x bicep curls)  Educated on continued OOB activity and impact on increasing functional independence.      Patient left up in chair with all lines intact, call button in reach, and Avasys present and notified    GOALS:   Multidisciplinary Problems       Occupational Therapy Goals          Problem: Occupational Therapy    Goal Priority Disciplines Outcome Interventions   Occupational Therapy Goal     OT, PT/OT Ongoing, Progressing    Description: Goals to be met by: 3/10/2023    Patient will increase functional independence with ADLs by performing:    UE Dressing with Set-up Assistance.  LE Dressing with Setup.  Grooming while standing at sink with Modified [place]  Toileting from toilet with Modified [place] for hygiene and clothing management.   Supine to sit with Modified [place].  Stand pivot transfers with Modified [place].  Toilet transfer to toilet with Modified [place].                         Time Tracking:     OT Date of Treatment: 03/03/23  OT Start Time: 1435  OT Stop Time: 1459  OT Total Time (min): 24 min    Billable Minutes:Self Care/Home Management 12  Therapeutic Exercise 12    OT/FROILAN: OT          3/3/2023

## 2023-03-03 NOTE — PROGRESS NOTES
[facility]  Colorectal Surgery  Progress Note    Patient Name: Odette Hart  MRN: [id number]  Admission Date: 1/31/2023  Hospital Length of Stay: 31 days  Attending Physician: Rafa Cardozo MD    Subjective:     Interval History: no acute events overntie, eating better, adequate pain control, colostomy functional    Post-Op Info:  Procedure(s) (LRB):  REVISION, COLOSTOMY (N/A)  INSERTION, CATHETER, URETER, BILATERAL (Left)  CYSTOSCOPY, WITH URETERAL STENT INSERTION (Right)  DEBRIDEMENT, ABDOMEN (N/A)  COLECTOMY, PARTIAL  LYSIS, ADHESIONS (N/A)   25 Days Post-Op      Medications:  Continuous Infusions:  Scheduled Meds:   aspirin  81 mg Oral Daily    balsam peru-castor oiL   Topical (Top) BID    clopidogreL  75 mg Oral Daily    ezetimibe  10 mg Oral Daily    famotidine  20 mg Oral BID    furosemide  20 mg Oral Daily    metoprolol succinate  25 mg Oral Daily    moxifloxacin  400 mg Oral Daily    polyethylene glycol  17 g Oral Daily     PRN Meds:   acetaminophen    albuterol    ALPRAZolam    dextrose 10%    dextrose 10%    fluticasone propionate    naloxone    nitroGLYCERIN    ondansetron    oxyCODONE    oxyCODONE    prochlorperazine    simethicone    sodium chloride 0.9%    sodium chloride 0.9%        Objective:     Vital Signs (Most Recent):  Temp: 97.3 °F (36.3 °C) (03/03/23 0443)  Pulse: 86 (03/03/23 0443)  Resp: 18 (03/03/23 0536)  BP: (!) 110/58 (03/03/23 0443)  SpO2: 100 % (03/03/23 0443)   Vital Signs (24h Range):  Temp:  [97.3 °F (36.3 °C)-97.6 °F (36.4 °C)] 97.3 °F (36.3 °C)  Pulse:  [69-86] 86  Resp:  [14-20] 18  SpO2:  [96 %-100 %] 100 %  BP: ()/(51-58) 110/58     Intake/Output - Last 3 Shifts         03/01 0700  03/02 0659 03/02 0700  03/03 0659 03/03 0700  03/04 0659    P.O. 200      Total Intake(mL/kg) 200 (3.9)      Urine (mL/kg/hr) 300 (0.2) 1100 (0.9)     Drains 700      Stool 180 100     Total Output 1180 1200     Net -980 -1200            Stool Occurrence  1 x 0 x             Physical Exam  Vitals and nursing note reviewed.   Constitutional:       Appearance: She is well-developed.   HENT:      Head: Normocephalic.   Eyes:      Pupils: Pupils are equal, round, and reactive to light.   Cardiovascular:      Rate and Rhythm: Normal rate and regular rhythm.      Heart sounds: Normal heart sounds.   Pulmonary:      Effort: Pulmonary effort is normal. No respiratory distress.      Breath sounds: Normal breath sounds. No wheezing or rales.   Abdominal:      Palpations: Abdomen is soft. There is no mass.      Tenderness: There is no guarding or rebound.      Comments: Abd wound packing in place  Colostomy funtional   Skin:     General: Skin is warm and dry.   Neurological:      Mental Status: She is alert and oriented to person, place, and time.           Significant Labs:  BMP (Last 3 Results):   Recent Labs   Lab 02/27/23  0307 02/28/23  0405 03/01/23  0833 03/03/23  0300   * 95 130* 90   * 131* 132* 136   K 3.6 4.0 4.0 3.7   CL 91* 94* 95 99   CO2 30* 30* 31* 32*   BUN 19 18 13 13   CREATININE 0.7 0.7 0.7 0.6   CALCIUM 8.5* 8.6* 8.9 8.7   MG 1.8 1.8  --  1.9       CBC (Last 3 Results):   Recent Labs   Lab 02/27/23  0307 03/01/23  0833   WBC 4.68 4.34   RBC 3.86* 4.60   HGB 9.2* 10.7*   HCT 30.7* 36.0*    338   MCV 80* 78*   MCH 23.8* 23.3*   MCHC 30.0* 29.7*         Significant Diagnostics:  None    Assessment/Plan:     * Colocutaneous fistula  OR 2/6/23 revision colostomy (see op note)    -await return of bowel function, colostomy funcitonal  -continue multi modality for pain control  -encourage ambulation and use of IS  -phani hose and SCD  -prophalactic lovenox and PPI  -cont local wound care, abd wound with packing, due to location on lower abd and large abd pt can not perform wound care herself  -plan to discharage home in am    Ileus  Post op ileus not surgical complication but expected outcome  Ileus now resolved       Severe protein-calorie  malnutrition  Nutrition consulted. Most recent weight and BMI monitored-     Malnutrition Type and Energy Intake  Malnutrition Type: acute illness or injury    Malnutrition (Moderate to Severe)  Weight Loss (Malnutrition): greater than 5% in 1 month  Energy Intake (Malnutrition): less than or equal to 50% for greater than or equal to 5 days    Final Summary  Subcutaneous Fat Loss (Final Summary): moderate protein-calorie malnutrition  Muscle Loss Evaluation (Final Summary): moderate protein-calorie malnutrition    Malnutrition Final Summary  Severe Weight Loss (Malnutrition): greater than 5% in 1 month    Measurements:  Wt Readings from Last 1 Encounters:   02/26/23 51.3 kg (113 lb)   Body mass index is 20.02 kg/m².    Recommendations: Recommendation/Intervention: 1.) Recommend continuing with regular diet as tolerated, encouraging PO intake and honoring food preferences. 2.) Recommend continuing Boost plus TID, chilled as per pt's preferences, to help meet EEN/EPN. 3.) RD to monitor.  Goals: meets % een/epn by next rd f/u    Patient has been screened and assessed by RD. RD will follow patient.      Hyponatremia  Monitor labs  Appreciate medicine input    Hypertension  Chronic, controlled.  Latest blood pressure and vitals reviewed-   Temp:  [97 °F (36.1 °C)-97.7 °F (36.5 °C)]   Pulse:  [68-84]   Resp:  [16-19]   BP: ()/(45-52)   SpO2:  [94 %-97 %] .   Home meds for hypertension were reviewed and noted below.   Hypertension Medications             furosemide (LASIX) 40 MG tablet Take 1 tablet (40 mg total) by mouth once daily.    losartan (COZAAR) 25 MG tablet Take 1 tablet (25 mg total) by mouth once daily.    metoprolol succinate (TOPROL-XL) 25 MG 24 hr tablet Take 1 tablet (25 mg total) by mouth once daily.    nitroGLYCERIN (NITROSTAT) 0.4 MG SL tablet Place 1 tablet (0.4 mg total) under the tongue every 5 (five) minutes as needed for Chest pain.    spironolactone (ALDACTONE) 25 MG tablet Take 0.5  tablets (12.5 mg total) by mouth once daily.          While in the hospital, will manage blood pressure as follows; Continue home antihypertensive regimen    Will utilize p.r.n. blood pressure medication only if patient's blood pressure greater than  180/110 and she develops symptoms such as worsening chest pain or shortness of breath.    Coronary artery disease  Cont home meds  appreciate medicine input    Automatic implantable cardioverter-defibrillator in situ  Cont tele    Ischemic cardiomyopathy  Cont home meds  appeciate medicine assistance with multiple medical problems  Monitor vs  monitior labs          LITA DUBON MD  Colorectal Surgery  [facility]

## 2023-03-03 NOTE — PLAN OF CARE
SW sent patient information to [facility] via [employer] system.       03/03/23 1040   Post-Acute Status   Post-Acute Authorization Home Health   Home Health Status Referrals Sent   Patient choice form signed by patient/caregiver List with quality metrics by geographic area provided

## 2023-03-04 NOTE — ASSESSMENT & PLAN NOTE
OR 2/6/23 revision colostomy (see op note)    -await return of bowel function, colostomy funcitonal  -continue multi modality for pain control  -encourage ambulation and use of IS   -phani hose and SCD  -prophalactic lovenox and PPI  -cont local wound care, abd wound with packing, due to location on lower abd and large abd pt can not perform wound care herself  -SNF admission currently pending

## 2023-03-04 NOTE — PROGRESS NOTES
[facility]  Colorectal Surgery  Progress Note    Patient Name: Odette Hart  MRN: [id number]  Admission Date: 1/31/2023  Hospital Length of Stay: 32 days  Attending Physician: Rafa Cardozo MD    Subjective:     Interval History: Afebrile, NAEON.  BP's soft to 90's/40's overnight but asymptomatic; vits otherwise stable.  Pain has been reportedly well-controlled, and abdominal dressing was changed this morning.     Post-Op Info:  Procedure(s) (LRB):  REVISION, COLOSTOMY (N/A)  INSERTION, CATHETER, URETER, BILATERAL (Left)  CYSTOSCOPY, WITH URETERAL STENT INSERTION (Right)  DEBRIDEMENT, ABDOMEN (N/A)  COLECTOMY, PARTIAL  LYSIS, ADHESIONS (N/A)   26 Days Post-Op      Medications:  Continuous Infusions:  Scheduled Meds:   aspirin  81 mg Oral Daily    balsam peru-castor oiL   Topical (Top) BID    clopidogreL  75 mg Oral Daily    ezetimibe  10 mg Oral Daily    famotidine  20 mg Oral BID    furosemide  20 mg Oral Daily    metoprolol succinate  25 mg Oral Daily    polyethylene glycol  17 g Oral Daily     PRN Meds:   acetaminophen    albuterol    ALPRAZolam    dextrose 10%    dextrose 10%    fluticasone propionate    naloxone    nitroGLYCERIN    ondansetron    oxyCODONE    oxyCODONE    prochlorperazine    simethicone    sodium chloride 0.9%    sodium chloride 0.9%        Objective:     Vital Signs (Most Recent):  Temp: 98.3 °F (36.8 °C) (03/04/23 0700)  Pulse: 85 (03/04/23 0700)  Resp: 18 (03/04/23 0843)  BP: (!) 107/53 (03/04/23 0700)  SpO2: 99 % (03/04/23 0700)   Vital Signs (24h Range):  Temp:  [97.4 °F (36.3 °C)-98.6 °F (37 °C)] 98.3 °F (36.8 °C)  Pulse:  [71-85] 85  Resp:  [14-18] 18  SpO2:  [97 %-99 %] 99 %  BP: ()/(45-56) 107/53     Intake/Output - Last 3 Shifts         03/02 0700 03/03 0659 03/03 0700 03/04 0659 03/04 0700 03/05 0659    P.O.  120     Total Intake(mL/kg)  120 (2.3)     Urine (mL/kg/hr) 1100 (0.9)      Drains       Stool 100      Total Output 1200       Net -1200 +120            Stool Occurrence 0 x              Physical Exam  Vitals and nursing note reviewed.   Constitutional:       Appearance: She is well-developed.   HENT:      Head: Normocephalic.   Eyes:      Pupils: Pupils are equal, round, and reactive to light.   Cardiovascular:      Rate and Rhythm: Normal rate and regular rhythm.      Heart sounds: Normal heart sounds.   Pulmonary:      Effort: Pulmonary effort is normal. No respiratory distress.      Breath sounds: Normal breath sounds. No wheezing or rales.   Abdominal:      Palpations: Abdomen is soft. There is no mass.      Tenderness: There is no guarding or rebound.      Comments: Abd wound packing in place  Colostomy funtional   Skin:     General: Skin is warm and dry.   Neurological:      Mental Status: She is alert and oriented to person, place, and time.    Significant Labs:  All pertinent lab results within the last 24 hours have been reviewed.     Significant Diagnostics:  I have reviewed all pertinent imaging results/findings within the past 24 hours.    Assessment/Plan:     * Colocutaneous fistula  OR 2/6/23 revision colostomy (see op note)    -await return of bowel function, colostomy funcitonal  -continue multi modality for pain control  -encourage ambulation and use of IS   -phani hose and SCD  -prophalactic lovenox and PPI  -cont local wound care, abd wound with packing, due to location on lower abd and large abd pt can not perform wound care herself  -SNF admission currently pending     Ileus  Post op ileus not surgical complication but expected outcome      Severe protein-calorie malnutrition  Nutrition consulted. Most recent weight and BMI monitored-     Malnutrition Type and Energy Intake  Malnutrition Type: acute illness or injury    Malnutrition (Moderate to Severe)  Weight Loss (Malnutrition): greater than 5% in 1 month  Energy Intake (Malnutrition): less than or equal to 50% for greater than or equal to 5 days    Final  Summary  Subcutaneous Fat Loss (Final Summary): moderate protein-calorie malnutrition  Muscle Loss Evaluation (Final Summary): moderate protein-calorie malnutrition    Malnutrition Final Summary  Severe Weight Loss (Malnutrition): greater than 5% in 1 month    Measurements:  Wt Readings from Last 1 Encounters:   02/26/23 51.3 kg (113 lb)   Body mass index is 20.02 kg/m².    Recommendations: Recommendation/Intervention: 1.) Recommend continuing with regular diet as tolerated, encouraging PO intake and honoring food preferences. 2.) Recommend continuing Boost plus TID, chilled as per pt's preferences, to help meet EEN/EPN. 3.) RD to monitor.  Goals: meets % een/epn by next rd f/u    Patient has been screened and assessed by RD. RD will follow patient.      Hyponatremia  Monitor labs  Appreciate medicine input    Hypertension  Chronic, controlled.  Latest blood pressure and vitals reviewed-   Temp:  [97 °F (36.1 °C)-97.7 °F (36.5 °C)]   Pulse:  [68-84]   Resp:  [16-19]   BP: ()/(45-52)   SpO2:  [94 %-97 %] .   Home meds for hypertension were reviewed and noted below.   Hypertension Medications             furosemide (LASIX) 40 MG tablet Take 1 tablet (40 mg total) by mouth once daily.    losartan (COZAAR) 25 MG tablet Take 1 tablet (25 mg total) by mouth once daily.    metoprolol succinate (TOPROL-XL) 25 MG 24 hr tablet Take 1 tablet (25 mg total) by mouth once daily.    nitroGLYCERIN (NITROSTAT) 0.4 MG SL tablet Place 1 tablet (0.4 mg total) under the tongue every 5 (five) minutes as needed for Chest pain.    spironolactone (ALDACTONE) 25 MG tablet Take 0.5 tablets (12.5 mg total) by mouth once daily.          While in the hospital, will manage blood pressure as follows; Continue home antihypertensive regimen    Will utilize p.r.n. blood pressure medication only if patient's blood pressure greater than  180/110 and she develops symptoms such as worsening chest pain or shortness of breath.    Coronary  artery disease  Cont home meds  appreciate medicine input    Automatic implantable cardioverter-defibrillator in situ  Cont tele    Ischemic cardiomyopathy  Cont home meds  appeciate medicine assistance with multiple medical problems  Monitor vs  monitior labs          JADON Mary MD  Colorectal Surgery  [facility]

## 2023-03-04 NOTE — SUBJECTIVE & OBJECTIVE
Subjective:     Interval History: Afebrile, NAEON.  BP's soft to 90's/40's overnight but asymptomatic; vits otherwise stable.  Pain has been reportedly well-controlled, and abdominal dressing was changed this morning.     Post-Op Info:  Procedure(s) (LRB):  REVISION, COLOSTOMY (N/A)  INSERTION, CATHETER, URETER, BILATERAL (Left)  CYSTOSCOPY, WITH URETERAL STENT INSERTION (Right)  DEBRIDEMENT, ABDOMEN (N/A)  COLECTOMY, PARTIAL  LYSIS, ADHESIONS (N/A)   26 Days Post-Op      Medications:  Continuous Infusions:  Scheduled Meds:   aspirin  81 mg Oral Daily    balsam peru-castor oiL   Topical (Top) BID    clopidogreL  75 mg Oral Daily    ezetimibe  10 mg Oral Daily    famotidine  20 mg Oral BID    furosemide  20 mg Oral Daily    metoprolol succinate  25 mg Oral Daily    polyethylene glycol  17 g Oral Daily     PRN Meds:   acetaminophen    albuterol    ALPRAZolam    dextrose 10%    dextrose 10%    fluticasone propionate    naloxone    nitroGLYCERIN    ondansetron    oxyCODONE    oxyCODONE    prochlorperazine    simethicone    sodium chloride 0.9%    sodium chloride 0.9%        Objective:     Vital Signs (Most Recent):  Temp: 98.3 °F (36.8 °C) (03/04/23 0700)  Pulse: 85 (03/04/23 0700)  Resp: 18 (03/04/23 0843)  BP: (!) 107/53 (03/04/23 0700)  SpO2: 99 % (03/04/23 0700)   Vital Signs (24h Range):  Temp:  [97.4 °F (36.3 °C)-98.6 °F (37 °C)] 98.3 °F (36.8 °C)  Pulse:  [71-85] 85  Resp:  [14-18] 18  SpO2:  [97 %-99 %] 99 %  BP: ()/(45-56) 107/53     Intake/Output - Last 3 Shifts         03/02 0700 03/03 0659 03/03 0700 03/04 0659 03/04 0700 03/05 0659    P.O.  120     Total Intake(mL/kg)  120 (2.3)     Urine (mL/kg/hr) 1100 (0.9)      Drains       Stool 100      Total Output 1200      Net -1200 +120            Stool Occurrence 0 x              Physical Exam  Vitals and nursing note reviewed.   Constitutional:       Appearance: She is well-developed.   HENT:      Head: Normocephalic.   Eyes:      Pupils: Pupils are  equal, round, and reactive to light.   Cardiovascular:      Rate and Rhythm: Normal rate and regular rhythm.      Heart sounds: Normal heart sounds.   Pulmonary:      Effort: Pulmonary effort is normal. No respiratory distress.      Breath sounds: Normal breath sounds. No wheezing or rales.   Abdominal:      Palpations: Abdomen is soft. There is no mass.      Tenderness: There is no guarding or rebound.      Comments: Abd wound packing in place  Colostomy funtional   Skin:     General: Skin is warm and dry.   Neurological:      Mental Status: She is alert and oriented to person, place, and time.    Significant Labs:  All pertinent lab results within the last 24 hours have been reviewed.     Significant Diagnostics:  I have reviewed all pertinent imaging results/findings within the past 24 hours.

## 2023-03-04 NOTE — PLAN OF CARE
Patient AAO X 4 lying in bed with respiratory unlabored. BP runs soft, MD is aware. No SOB/N/V. Surgical incision to abdomen covered  with gauze and tape. Sacral wound, FROILAN. Patient gets up to BSC independently. Colostomy and purwick in place. Ostomy put out about 150 cc's with adequate urine output. Bed in lowest position with call light position. No distress noted.   Problem: Adult Inpatient Plan of Care  Goal: Plan of Care Review  Outcome: Ongoing, Progressing  Goal: Patient-Specific Goal (Individualized)  Outcome: Ongoing, Progressing  Goal: Absence of Hospital-Acquired Illness or Injury  Outcome: Ongoing, Progressing  Goal: Optimal Comfort and Wellbeing  Outcome: Ongoing, Progressing  Goal: Readiness for Transition of Care  Outcome: Ongoing, Progressing     Problem: Fall Injury Risk  Goal: Absence of Fall and Fall-Related Injury  Outcome: Ongoing, Progressing     Problem: Impaired Wound Healing  Goal: Optimal Wound Healing  Outcome: Ongoing, Progressing     Problem: Infection  Goal: Absence of Infection Signs and Symptoms  Outcome: Ongoing, Progressing     Problem: Skin Injury Risk Increased  Goal: Skin Health and Integrity  Outcome: Ongoing, Progressing     Problem: Coping Ineffective  Goal: Effective Coping  Outcome: Ongoing, Progressing     Problem: Pain Acute  Goal: Acceptable Pain Control and Functional Ability  Outcome: Ongoing, Progressing     Problem: Adjustment to Surgery (Ileostomy)  Goal: Optimal Coping  Outcome: Ongoing, Progressing     Problem: Bleeding (Ileostomy)  Goal: Absence of Bleeding  Outcome: Ongoing, Progressing     Problem: Fluid, Electrolyte and Nutrition Imbalance (Ileostomy)  Goal: Fluid, Electrolyte and Nutrition Balance  Outcome: Ongoing, Progressing     Problem: Infection (Ileostomy)  Goal: Absence of Infection Signs and Symptoms  Outcome: Ongoing, Progressing     Problem: Ongoing Anesthesia Effects (Ileostomy)  Goal: Anesthesia/Sedation Recovery  Outcome: Ongoing, Progressing      Problem: Pain (Ileostomy)  Goal: Acceptable Pain Control  Outcome: Ongoing, Progressing     Problem: Postoperative Nausea and Vomiting (Ileostomy)  Goal: Nausea and Vomiting Relief  Outcome: Ongoing, Progressing     Problem: Postoperative Stoma Care (Ileostomy)  Goal: Optimal Stoma Healing  Outcome: Ongoing, Progressing     Problem: Postoperative Urinary Retention (Ileostomy)  Goal: Effective Urinary Elimination  Outcome: Ongoing, Progressing     Problem: Respiratory Compromise (Ileostomy)  Goal: Effective Oxygenation and Ventilation  Outcome: Ongoing, Progressing

## 2023-03-05 NOTE — PLAN OF CARE
Patient AAO lying in bed with respiratory unlabored. No SOB/N/V. Patient had one bout of anxiety, given prn medication. Surgical incision to abdomen covered with gauze and tape. BSC at bedside. Adequate urine output and BM overnight with ostomy bag. Ointment applied to sacral wound. FROILAN. Bed in lowest position with call light at bedside. No distress noted.    Problem: Adult Inpatient Plan of Care  Goal: Plan of Care Review  3/5/2023 0634 by Nestor Red RN  Outcome: Ongoing, Progressing  3/4/2023 2336 by Nestor Red RN  Outcome: Ongoing, Progressing  Goal: Patient-Specific Goal (Individualized)  3/5/2023 0634 by Nestor Red RN  Outcome: Ongoing, Progressing  3/4/2023 2336 by Nestor Red RN  Outcome: Ongoing, Progressing  Goal: Absence of Hospital-Acquired Illness or Injury  3/5/2023 0634 by Nestor Red RN  Outcome: Ongoing, Progressing  3/4/2023 2336 by Nestor Red RN  Outcome: Ongoing, Progressing  Goal: Optimal Comfort and Wellbeing  3/5/2023 0634 by Nestor eRd RN  Outcome: Ongoing, Progressing  3/4/2023 2336 by Nestor Red RN  Outcome: Ongoing, Progressing  Goal: Readiness for Transition of Care  3/5/2023 0634 by Nestor Red RN  Outcome: Ongoing, Progressing  3/4/2023 2336 by Nestor Red RN  Outcome: Ongoing, Progressing     Problem: Fall Injury Risk  Goal: Absence of Fall and Fall-Related Injury  3/5/2023 0634 by Nestor Red RN  Outcome: Ongoing, Progressing  3/4/2023 2336 by Nestor Red RN  Outcome: Ongoing, Progressing     Problem: Impaired Wound Healing  Goal: Optimal Wound Healing  3/5/2023 0634 by Nestor Red RN  Outcome: Ongoing, Progressing  3/4/2023 2336 by Nestor Red RN  Outcome: Ongoing, Progressing     Problem: Infection  Goal: Absence of Infection Signs and Symptoms  3/5/2023 0634 by Nestor Red RN  Outcome: Ongoing, Progressing  3/4/2023 2336 by Nestor Red RN  Outcome: Ongoing, Progressing     Problem: Skin Injury Risk  Increased  Goal: Skin Health and Integrity  3/5/2023 0634 by Nestor Red RN  Outcome: Ongoing, Progressing  3/4/2023 2336 by Nestor Red RN  Outcome: Ongoing, Progressing     Problem: Coping Ineffective  Goal: Effective Coping  3/5/2023 0634 by Nestor Red RN  Outcome: Ongoing, Progressing  3/4/2023 2336 by Nestor Red RN  Outcome: Ongoing, Progressing     Problem: Pain Acute  Goal: Acceptable Pain Control and Functional Ability  3/5/2023 0634 by Nestor Red RN  Outcome: Ongoing, Progressing  3/4/2023 2336 by Nestor Red RN  Outcome: Ongoing, Progressing     Problem: Adjustment to Surgery (Ileostomy)  Goal: Optimal Coping  3/5/2023 0634 by Nestor Red RN  Outcome: Ongoing, Progressing  3/4/2023 2336 by Nestor Red RN  Outcome: Ongoing, Progressing     Problem: Bleeding (Ileostomy)  Goal: Absence of Bleeding  3/5/2023 0634 by Nestor Red RN  Outcome: Ongoing, Progressing  3/4/2023 2336 by Nestor Red RN  Outcome: Ongoing, Progressing     Problem: Fluid, Electrolyte and Nutrition Imbalance (Ileostomy)  Goal: Fluid, Electrolyte and Nutrition Balance  3/5/2023 0634 by Nestor Red RN  Outcome: Ongoing, Progressing  3/4/2023 2336 by Nestor Red RN  Outcome: Ongoing, Progressing     Problem: Infection (Ileostomy)  Goal: Absence of Infection Signs and Symptoms  3/5/2023 0634 by Nestor Red RN  Outcome: Ongoing, Progressing  3/4/2023 2336 by Nestor Red RN  Outcome: Ongoing, Progressing     Problem: Ongoing Anesthesia Effects (Ileostomy)  Goal: Anesthesia/Sedation Recovery  3/5/2023 0634 by Nestor Red RN  Outcome: Ongoing, Progressing  3/4/2023 2336 by Nestor Red RN  Outcome: Ongoing, Progressing     Problem: Pain (Ileostomy)  Goal: Acceptable Pain Control  3/5/2023 0634 by Nestor Red RN  Outcome: Ongoing, Progressing  3/4/2023 2336 by Nestor Red RN  Outcome: Ongoing, Progressing     Problem: Postoperative Nausea and Vomiting (Ileostomy)  Goal:  Nausea and Vomiting Relief  3/5/2023 0634 by Nestor Red RN  Outcome: Ongoing, Progressing  3/4/2023 2336 by Nestor Red RN  Outcome: Ongoing, Progressing     Problem: Postoperative Stoma Care (Ileostomy)  Goal: Optimal Stoma Healing  3/5/2023 0634 by Nestor Red RN  Outcome: Ongoing, Progressing  3/4/2023 2336 by Nestor Red RN  Outcome: Ongoing, Progressing     Problem: Postoperative Urinary Retention (Ileostomy)  Goal: Effective Urinary Elimination  3/5/2023 0634 by Nestor Red RN  Outcome: Ongoing, Progressing  3/4/2023 2336 by Nestor Red RN  Outcome: Ongoing, Progressing     Problem: Respiratory Compromise (Ileostomy)  Goal: Effective Oxygenation and Ventilation  3/5/2023 0634 by Nestor Red RN  Outcome: Ongoing, Progressing  3/4/2023 2336 by Nestor Red RN  Outcome: Ongoing, Progressing

## 2023-03-05 NOTE — PLAN OF CARE
Pt AAOx4, VSS on RA.  Pain managed with  PRN meds. Midline incision, dressing in place .  Ostomy with 100 cc output. Regular diet tolerating well.  pt voiding spontaneously. Pt been our of bed in chair. Call bell within reach. Frequent rounds for safety.                Problem: Adult Inpatient Plan of Care  Goal: Plan of Care Review  Outcome: Ongoing, Progressing  Goal: Patient-Specific Goal (Individualized)  Outcome: Ongoing, Progressing  Goal: Absence of Hospital-Acquired Illness or Injury  Outcome: Ongoing, Progressing  Goal: Optimal Comfort and Wellbeing  Outcome: Ongoing, Progressing  Goal: Readiness for Transition of Care  Outcome: Ongoing, Progressing     Problem: Fall Injury Risk  Goal: Absence of Fall and Fall-Related Injury  Outcome: Ongoing, Progressing     Problem: Impaired Wound Healing  Goal: Optimal Wound Healing  Outcome: Ongoing, Progressing     Problem: Infection  Goal: Absence of Infection Signs and Symptoms  Outcome: Ongoing, Progressing     Problem: Skin Injury Risk Increased  Goal: Skin Health and Integrity  Outcome: Ongoing, Progressing     Problem: Coping Ineffective  Goal: Effective Coping  Outcome: Ongoing, Progressing     Problem: Pain Acute  Goal: Acceptable Pain Control and Functional Ability  Outcome: Ongoing, Progressing     Problem: Adjustment to Surgery (Ileostomy)  Goal: Optimal Coping  Outcome: Ongoing, Progressing     Problem: Pain (Ileostomy)  Goal: Acceptable Pain Control  Outcome: Ongoing, Progressing

## 2023-03-05 NOTE — PROGRESS NOTES
[facility]  Colorectal Surgery  Progress Note    Patient Name: Odette Hart  MRN: [id number]  Admission Date: 1/31/2023  Hospital Length of Stay: 33 days  Attending Physician: Rafa Cardozo MD    Subjective:     Interval History: Afebrile, VSS, NAEON.  Pain has been reportedly well controlled.  Ambulated down hallway yesterday with nursing assistance.      Post-Op Info:  Procedure(s) (LRB):  REVISION, COLOSTOMY (N/A)  INSERTION, CATHETER, URETER, BILATERAL (Left)  CYSTOSCOPY, WITH URETERAL STENT INSERTION (Right)  DEBRIDEMENT, ABDOMEN (N/A)  COLECTOMY, PARTIAL  LYSIS, ADHESIONS (N/A)   27 Days Post-Op      Medications:  Continuous Infusions:  Scheduled Meds:   aspirin  81 mg Oral Daily    balsam peru-castor oiL   Topical (Top) BID    clopidogreL  75 mg Oral Daily    ezetimibe  10 mg Oral Daily    famotidine  20 mg Oral BID    furosemide  20 mg Oral Daily    metoprolol succinate  25 mg Oral Daily    polyethylene glycol  17 g Oral Daily     PRN Meds:   acetaminophen    albuterol    ALPRAZolam    dextrose 10%    dextrose 10%    fluticasone propionate    naloxone    nitroGLYCERIN    ondansetron    oxyCODONE    oxyCODONE    prochlorperazine    simethicone    sodium chloride 0.9%    sodium chloride 0.9%        Objective:     Vital Signs (Most Recent):  Temp: 97.4 °F (36.3 °C) (03/05/23 0719)  Pulse: 84 (03/05/23 0719)  Resp: 18 (03/05/23 0719)  BP: (!) 116/58 (03/05/23 0719)  SpO2: 98 % (03/05/23 0719)   Vital Signs (24h Range):  Temp:  [97.3 °F (36.3 °C)-98.2 °F (36.8 °C)] 97.4 °F (36.3 °C)  Pulse:  [67-84] 84  Resp:  [16-18] 18  SpO2:  [97 %-100 %] 98 %  BP: ()/(45-58) 116/58     Intake/Output - Last 3 Shifts         03/03 0700  03/04 0659 03/04 0700  03/05 0659 03/05 0700  03/06 0659    P.O. 120      Total Intake(mL/kg) 120 (2.3)      Urine (mL/kg/hr)  875 (0.7)     Stool  50     Total Output  925     Net +120 -925            Urine Occurrence  4 x     Stool Occurrence  1 x              Physical Exam  Vitals and nursing note reviewed.   Constitutional:       Appearance: She is well-developed.   HENT:      Head: Normocephalic.   Eyes:      Pupils: Pupils are equal, round, and reactive to light.   Cardiovascular:      Rate and Rhythm: Normal rate and regular rhythm.      Heart sounds: Normal heart sounds.   Pulmonary:      Effort: Pulmonary effort is normal. No respiratory distress.      Breath sounds: Normal breath sounds. No wheezing or rales.   Abdominal:      Palpations: Abdomen is soft. There is no mass.      Tenderness: There is no guarding or rebound.      Comments: Abd wound packing in place  Colostomy funtional   Skin:     General: Skin is warm and dry.   Neurological:      Mental Status: She is alert and oriented to person, place, and time.    Significant Labs:  All pertinent lab results within the last 24 hours have been reviewed.     Significant Diagnostics:  I have reviewed all pertinent imaging results/findings within the past 24 hours.    Assessment/Plan:     * Colocutaneous fistula  OR 2/6/23 revision colostomy (see op note)    -await return of bowel function, colostomy funcitonal  -continue multi modality for pain control  -encourage ambulation and use of IS   -phani hose and SCD  -prophalactic lovenox and PPI  -cont local wound care, abd wound with packing, due to location on lower abd and large abd pt can not perform wound care herself  -SNF admission currently pending; son to sign transfer papers on Monday per ADAN Mary MD  Colorectal Surgery  [facility]

## 2023-03-05 NOTE — ASSESSMENT & PLAN NOTE
OR 2/6/23 revision colostomy (see op note)    -await return of bowel function, colostomy funcitonal  -continue multi modality for pain control  -encourage ambulation and use of IS   -phani hose and SCD  -prophalactic lovenox and PPI  -cont local wound care, abd wound with packing, due to location on lower abd and large abd pt can not perform wound care herself  -SNF admission currently pending; son to sign transfer papers on Monday per ADAN

## 2023-03-05 NOTE — NURSING
Bladder scan patient  X 2 ---266 ml and 293 ml. MD on call notified and will continue to monitor patient for bladder scan over 400 ml. WCTM

## 2023-03-05 NOTE — SUBJECTIVE & OBJECTIVE
Subjective:     Interval History: Afebrile, VSS, NAEON.  Pain has been reportedly well controlled.  Ambulated down hallway yesterday with nursing assistance.      Post-Op Info:  Procedure(s) (LRB):  REVISION, COLOSTOMY (N/A)  INSERTION, CATHETER, URETER, BILATERAL (Left)  CYSTOSCOPY, WITH URETERAL STENT INSERTION (Right)  DEBRIDEMENT, ABDOMEN (N/A)  COLECTOMY, PARTIAL  LYSIS, ADHESIONS (N/A)   27 Days Post-Op      Medications:  Continuous Infusions:  Scheduled Meds:   aspirin  81 mg Oral Daily    balsam peru-castor oiL   Topical (Top) BID    clopidogreL  75 mg Oral Daily    ezetimibe  10 mg Oral Daily    famotidine  20 mg Oral BID    furosemide  20 mg Oral Daily    metoprolol succinate  25 mg Oral Daily    polyethylene glycol  17 g Oral Daily     PRN Meds:   acetaminophen    albuterol    ALPRAZolam    dextrose 10%    dextrose 10%    fluticasone propionate    naloxone    nitroGLYCERIN    ondansetron    oxyCODONE    oxyCODONE    prochlorperazine    simethicone    sodium chloride 0.9%    sodium chloride 0.9%        Objective:     Vital Signs (Most Recent):  Temp: 97.4 °F (36.3 °C) (03/05/23 0719)  Pulse: 84 (03/05/23 0719)  Resp: 18 (03/05/23 0719)  BP: (!) 116/58 (03/05/23 0719)  SpO2: 98 % (03/05/23 0719)   Vital Signs (24h Range):  Temp:  [97.3 °F (36.3 °C)-98.2 °F (36.8 °C)] 97.4 °F (36.3 °C)  Pulse:  [67-84] 84  Resp:  [16-18] 18  SpO2:  [97 %-100 %] 98 %  BP: ()/(45-58) 116/58     Intake/Output - Last 3 Shifts         03/03 0700 03/04 0659 03/04 0700 03/05 0659 03/05 0700  03/06 0659    P.O. 120      Total Intake(mL/kg) 120 (2.3)      Urine (mL/kg/hr)  875 (0.7)     Stool  50     Total Output  925     Net +120 -925            Urine Occurrence  4 x     Stool Occurrence  1 x             Physical Exam  Vitals and nursing note reviewed.   Constitutional:       Appearance: She is well-developed.   HENT:      Head: Normocephalic.   Eyes:      Pupils: Pupils are equal, round, and reactive to light.    Cardiovascular:      Rate and Rhythm: Normal rate and regular rhythm.      Heart sounds: Normal heart sounds.   Pulmonary:      Effort: Pulmonary effort is normal. No respiratory distress.      Breath sounds: Normal breath sounds. No wheezing or rales.   Abdominal:      Palpations: Abdomen is soft. There is no mass.      Tenderness: There is no guarding or rebound.      Comments: Abd wound packing in place  Colostomy funtional   Skin:     General: Skin is warm and dry.   Neurological:      Mental Status: She is alert and oriented to person, place, and time.    Significant Labs:  All pertinent lab results within the last 24 hours have been reviewed.     Significant Diagnostics:  I have reviewed all pertinent imaging results/findings within the past 24 hours.

## 2023-03-05 NOTE — PLAN OF CARE
Problem: Adult Inpatient Plan of Care  Goal: Plan of Care Review  Outcome: Ongoing, Progressing  Goal: Patient-Specific Goal (Individualized)  Outcome: Ongoing, Progressing  Goal: Absence of Hospital-Acquired Illness or Injury  Outcome: Ongoing, Progressing  Goal: Optimal Comfort and Wellbeing  Outcome: Ongoing, Progressing  Goal: Readiness for Transition of Care  Outcome: Ongoing, Progressing     Problem: Fall Injury Risk  Goal: Absence of Fall and Fall-Related Injury  Outcome: Ongoing, Progressing     Problem: Impaired Wound Healing  Goal: Optimal Wound Healing  Outcome: Ongoing, Progressing     Problem: Infection  Goal: Absence of Infection Signs and Symptoms  Outcome: Ongoing, Progressing     Problem: Skin Injury Risk Increased  Goal: Skin Health and Integrity  Outcome: Ongoing, Progressing     Problem: Coping Ineffective  Goal: Effective Coping  Outcome: Ongoing, Progressing     Problem: Pain Acute  Goal: Acceptable Pain Control and Functional Ability  Outcome: Ongoing, Progressing     Problem: Adjustment to Surgery (Ileostomy)  Goal: Optimal Coping  Outcome: Ongoing, Progressing     Problem: Bleeding (Ileostomy)  Goal: Absence of Bleeding  Outcome: Ongoing, Progressing     Problem: Infection (Ileostomy)  Goal: Absence of Infection Signs and Symptoms  Outcome: Ongoing, Progressing     Problem: Pain (Ileostomy)  Goal: Acceptable Pain Control  Outcome: Ongoing, Progressing

## 2023-03-05 NOTE — NURSING
Pt AAOx4, VSS on RA. Pain managed with scheduled and PRN meds. Midline incision, dressing changes by MD. Sacral wound with mepilex. Ostomy with good output. Regular diet, tolerating well. Up to BSC adequate UOP. Pt ambulated with staff in hallway and sat up in chair for 2 hours this shift. Call bell within reach. Frequent rounds for safety. Will continue ongoing treatment.

## 2023-03-06 NOTE — DISCHARGE SUMMARY
[facility]  Colorectal Surgery  Discharge Summary      Patient Name: Odette Hart  MRN: [id number]  Admission Date: 1/31/2023  Hospital Length of Stay: 34 days  Discharge Date and Time: No discharge date for patient encounter.  Attending Physician: Rafa Cardozo MD   Discharging Provider: Carola Buckley NP  Primary Care Provider: Braxton Barragan MD     HPI:  No notes on file    Procedure(s) (LRB):  REVISION, COLOSTOMY (N/A)  INSERTION, CATHETER, URETER, BILATERAL (Left)  CYSTOSCOPY, WITH URETERAL STENT INSERTION (Right)  DEBRIDEMENT, ABDOMEN (N/A)  COLECTOMY, PARTIAL  LYSIS, ADHESIONS (N/A)     Hospital Course:    Admitted thru ER with prolapsed stoma on 1/31.  After medical and nutritional optimizing she was brought to surgery on 2/6/23.    Ms. Hart is a 67 year old woman with a history of CAD and PCI in 1/2022, HF (EF 20%), HTN, ICD, chronic hypoxemic respiratory failure, MDD, debility, severe malnutrition who underwent an open takedown of colovesical and colovaginal fistuli (two separate areas) with sigmoid colectomy, partial cecectomy and appendectomy, drainage of intra-abdominal abscess and end colostomy (with cholecystectomy by Dr. 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 She was admitted for pre-operative optimization and now takedown of her fistula with possible abdominal wall reconstruction. The benefits, risks, and alternatives were discussed with the patient, they were given the opportunity to ask questions and they elected to proceed with operative intervention after signing written consent.  Nephrolgy consulted for hyponatremia who recommended close monitoring of laba and I&O, slowly resume home meds.  Cardiology consulted for hx of HF and low EF,  PT/OT for mobilization.  2/7 PCA cont, advance to low fiber, refusing PT?OT and  not getting out of bed, social service for discharge planning, appreciate help from nephrolgy, hospital med, cards for caring for multiple comorbidiies.  2/8 post op ileus with no output from colostomy, n/v, made NPO.  2/9 decrease pca due to some confusion, small amt of output from stoma.  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  PCA dced, advance to reg diet, not coooperative with care, refuisng meds and refusing to ambulate or care for self.  2/13 Posiitve for UTI, Cefpodoxime for 5 days.  2/14 ostomy functioanl, wound probed with Q tip and hematoma drained, wound packing begun.  2/18 wound packing continued with some purulent drainage.  2/22 CT:     CT Abd/Pelvis (2/22/2023):   1. Organized rim enhancing fluid collection along the right anterior abdominal wall with extension through the abdominal wall into the subcutaneous soft tissues.  The collection abuts the ascending and transverse colon.  2. Continued suspected small bowel to rectal fistula.  3. Stable splenic infarction.  4. Right-sided ureteral stent in place.  No hydronephrosis.  5. Cardiomegaly.  6. 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  Avelox begun and wound packing continued BID.    Discharge planning difficult due to pt's insistence she only wants to go to the [facility] snf, insurance will not apporve [facility], pt very difficult to deal with as her story changes almost every day.  After several long discussions with Dr. Cardozo pt agreeable to go the an outside snf.  Pt accepted to snf 3/6/23 and transferred to stable condition         Goals of Care Treatment Preferences:  Code Status: Full Code    Health care agent: Abelardo Ortiz  Health care agent number: [phone]          What is most important right now is to focus on spending time at home, avoiding the hospital,  remaining as independent as possible, symptom/pain control, improvement in condition but with limits to invasive therapies.  Accordingly, we have decided that the best plan to meet the patient's goals includes  discussions and palliative care consult. Patient is also interested in completing a living will and POA paperwork..      Consults (From admission, onward)        Status Ordering Provider     Inpatient consult to Social Work  Once        Provider:  (Not yet assigned)    Acknowledged LITA DUBON     Inpatient consult to Palliative Care  Once        Provider:  (Not yet assigned)    Completed DAVIS DESOUZA     Inpatient consult to Heart Transplant  Once        Provider:  (Not yet assigned)    Completed SOL BELL     Inpatient consult to Nephrology  Once        Provider:  (Not yet assigned)    Completed JEIMY GARNETT     Inpatient consult to Midline team  Once        Provider:  (Not yet assigned)    Completed UDAY CARMONA     Inpatient consult to [facility] Medicine-General  Once        Provider:  (Not yet assigned)    Completed JEIMY GARNETT     Inpatient consult to Registered Dietitian/Nutritionist  Once        Provider:  (Not yet assigned)    Completed NILE JACOBSEN     Inpatient consult to Midline team  Once        Provider:  (Not yet assigned)    Completed UDAY CARMONA     Inpatient consult to Cardiology  Once        Provider:  (Not yet assigned)    Completed JEIMY GARNETT          Significant Diagnostic Studies: Labs:   BMP:   Recent Labs   Lab 03/06/23 0447   GLU 81   *   K 3.4*   CL 97   CO2 30*   BUN 10   CREATININE 0.6   CALCIUM 8.5*   MG 1.8    and CBC   Recent Labs   Lab 03/06/23 0447   WBC 2.54*   HGB 9.9*   HCT 33.7*          Pending Diagnostic Studies:     Procedure Component Value Units Date/Time    Creatinine, Random Urine [[id number]] Collected: 02/05/23 0547    Order Status: Sent Lab Status: In process Updated:  02/05/23 0548    Specimen: Urine, Clean Catch     Protein/Creatinine Ratio, Urine [[id number]] Collected: 02/04/23 2244    Order Status: Sent Lab Status: In process Updated: 02/04/23 2244    Specimen: Urine, Clean Catch         Final Active Diagnoses:    Diagnosis Date Noted POA    PRINCIPAL PROBLEM:  Colocutaneous fistula [K63.2] 12/22/2022 Yes    Goals of care, counseling/discussion [Z71.89] 02/28/2023 Not Applicable    Gross hematuria [R31.0] 02/22/2023 No    Ileus [K56.7] 02/10/2023 No    Encounter for palliative care [Z51.5] 08/30/2022 Not Applicable    Severe protein-calorie malnutrition [E43] 07/18/2022 Yes    Hypotension [I95.9] 05/19/2022 Clinically Undetermined    Hyponatremia [E87.1] 05/15/2022 Yes    Hypertension [I10] 01/24/2022 Yes    Hypercholesterolemia [E78.00] 01/22/2022 Yes    Coronary artery disease [I25.10] 04/19/2021 Yes    Ischemic cardiomyopathy [I25.5] 02/04/2019 Yes    Automatic implantable cardioverter-defibrillator in situ [Z95.810] 02/04/2019 Yes      Problems Resolved During this Admission:    Diagnosis Date Noted Date Resolved POA    Acute cystitis with hematuria [N30.01] 02/13/2023 02/17/2023 Unknown    Preop examination [Z01.818] 02/03/2023 02/06/2023 Not Applicable      Discharged Condition: good    Disposition: Skilled Nursing Facility    Follow Up:   Follow-up Information     [facility] Follow up.    Specialties: Home Health Services, Home Therapy Services, Home Living Aide Services  Why: Home Health  Contact information:  [address]  [facility] [address]  [facility] [id number] [phone]                     Patient Instructions:      Ambulatory referral/consult to [facility]   Standing Status: Future   Referral Priority: Routine Referral Type: Consultation   Referral Reason: Specialty Services Required   Number of Visits Requested: 1     Diet Adult Regular     Notify your health care provider if you  experience any of the following:  increased confusion or weakness     Notify your health care provider if you experience any of the following:  persistent dizziness, light-headedness, or visual disturbances     Notify your health care provider if you experience any of the following:  worsening rash     Notify your health care provider if you experience any of the following:  severe persistent headache     Notify your health care provider if you experience any of the following:  difficulty breathing or increased cough     Notify your health care provider if you experience any of the following:  redness, tenderness, or signs of infection (pain, swelling, redness, odor or green/yellow discharge around incision site)     Notify your health care provider if you experience any of the following:  severe uncontrolled pain     Notify your health care provider if you experience any of the following:  persistent nausea and vomiting or diarrhea     Notify your health care provider if you experience any of the following:  temperature >100.4     Change dressing (specify)   Order Comments: Dressing change: Two times per day using saline flush with iodoform packing.     Activity as tolerated     Ambulatory Request for Ready Responder Services   Standing Status: Future Standing Exp. Date: 02/24/24     Order Specific Question Answer Comments   Program referred to: COVID-19 Vaccine      Medications:  Reconciled Home Medications:      Medication List      START taking these medications    albuterol 90 mcg/actuation inhaler  Commonly known as: PROVENTIL/VENTOLIN HFA  Inhale 2 puffs into the lungs every 6 (six) hours as needed for Wheezing. Rescue     oxyCODONE 5 MG immediate release tablet  Commonly known as: ROXICODONE  Take 1 tablet (5 mg total) by mouth every 4 (four) hours as needed for Pain.     polyethylene glycol 17 gram/dose powder  Commonly known as: GLYCOLAX  Use cap to measure 17 g, then mix in liquid and drink by mouth once  daily.        CHANGE how you take these medications    furosemide 20 MG tablet  Commonly known as: LASIX  Take 1 tablet (20 mg total) by mouth once daily.  What changed:   · medication strength  · how much to take        CONTINUE taking these medications    acetaminophen 500 MG tablet  Commonly known as: TYLENOL  Take 2 tablets (1,000 mg total) by mouth every 8 (eight) hours as needed for Pain.     ALPRAZolam 0.5 MG tablet  Commonly known as: XANAX  Take 1 tablet (0.5 mg total) by mouth nightly as needed for Anxiety.     aspirin 81 MG EC tablet  Commonly known as: ECOTRIN  Take 1 tablet (81 mg total) by mouth once daily.     clopidogreL 75 mg tablet  Commonly known as: PLAVIX  Take 1 tablet (75 mg total) by mouth once daily.     empagliflozin 10 mg tablet  Commonly known as: JARDIANCE  Take 1 tablet (10 mg total) by mouth once daily.     ENSURE HIGH PROTEIN Liqd  Generic drug: food supplemt, lactose-reduced  Take 240 mLs by mouth 2 (two) times a day.     ezetimibe 10 mg tablet  Commonly known as: ZETIA  Take 1 tablet (10 mg total) by mouth once daily.     famotidine 20 MG tablet  Commonly known as: PEPCID  Take 1 tablet (20 mg total) by mouth once daily.     fluticasone propionate 50 mcg/actuation nasal spray  Commonly known as: FLONASE  1 spray (50 mcg total) by Each Nostril route once daily.     metoprolol succinate 25 MG 24 hr tablet  Commonly known as: TOPROL-XL  Take 1 tablet (25 mg total) by mouth once daily.     simethicone 80 MG chewable tablet  Commonly known as: MYLICON  Take 1 tablet (80 mg total) by mouth every 8 (eight) hours as needed for Flatulence.        STOP taking these medications    docusate sodium 100 MG capsule  Commonly known as: COLACE     HYDROcodone-acetaminophen 5-325 mg per tablet  Commonly known as: NORCO     losartan 25 MG tablet  Commonly known as: COZAAR     ondansetron 8 MG Tbdl  Commonly known as: ZOFRAN-ODT     spironolactone 25 MG tablet  Commonly known as: ALDACTONE        ASK  your doctor about these medications    nitroGLYCERIN 0.4 MG SL tablet  Commonly known as: NITROSTAT  Place 1 tablet (0.4 mg total) under the tongue every 5 (five) minutes as needed for Chest pain.            Carola Buckley NP  Colorectal Surgery  [facility]

## 2023-03-06 NOTE — DISCHARGE INSTRUCTIONS
See above documented clinical references  Follow up with PCP   Take all medications as indicated

## 2023-03-06 NOTE — ASSESSMENT & PLAN NOTE
Chronic, controlled.  Latest blood pressure and vitals reviewed-   Temp:  [97.7 °F (36.5 °C)-99.4 °F (37.4 °C)]   Pulse:  [75-86]   Resp:  [16-18]   BP: ()/(52-56)   SpO2:  [96 %-99 %] .   Home meds for hypertension were reviewed and noted below.   Hypertension Medications             furosemide (LASIX) 40 MG tablet Take 1 tablet (40 mg total) by mouth once daily.    losartan (COZAAR) 25 MG tablet Take 1 tablet (25 mg total) by mouth once daily.    metoprolol succinate (TOPROL-XL) 25 MG 24 hr tablet Take 1 tablet (25 mg total) by mouth once daily.    nitroGLYCERIN (NITROSTAT) 0.4 MG SL tablet Place 1 tablet (0.4 mg total) under the tongue every 5 (five) minutes as needed for Chest pain.    spironolactone (ALDACTONE) 25 MG tablet Take 0.5 tablets (12.5 mg total) by mouth once daily.          While in the hospital, will manage blood pressure as follows; Continue home antihypertensive regimen    Will utilize p.r.n. blood pressure medication only if patient's blood pressure greater than  180/110 and she develops symptoms such as worsening chest pain or shortness of breath.

## 2023-03-06 NOTE — PLAN OF CARE
Patient AAO lying in bed with respiratory unlabored on RA. Vital signs stable. No SOB/V/N. Pain managed with PRN medications. Midline to abdomen with gauze and tape. Ostomy bag in place. Adequate urine output using BSC. Ointment to sacral wound. FROILAN Bed in lowest position, wheels locked with call light at bedside. No distress noted.     Problem: Adult Inpatient Plan of Care  Goal: Plan of Care Review  Outcome: Ongoing, Progressing  Goal: Patient-Specific Goal (Individualized)  Outcome: Ongoing, Progressing  Goal: Absence of Hospital-Acquired Illness or Injury  Outcome: Ongoing, Progressing  Goal: Optimal Comfort and Wellbeing  Outcome: Ongoing, Progressing  Goal: Readiness for Transition of Care  Outcome: Ongoing, Progressing     Problem: Fall Injury Risk  Goal: Absence of Fall and Fall-Related Injury  Outcome: Ongoing, Progressing     Problem: Impaired Wound Healing  Goal: Optimal Wound Healing  Outcome: Ongoing, Progressing     Problem: Infection  Goal: Absence of Infection Signs and Symptoms  Outcome: Ongoing, Progressing     Problem: Skin Injury Risk Increased  Goal: Skin Health and Integrity  Outcome: Ongoing, Progressing     Problem: Coping Ineffective  Goal: Effective Coping  Outcome: Ongoing, Progressing     Problem: Pain Acute  Goal: Acceptable Pain Control and Functional Ability  Outcome: Ongoing, Progressing     Problem: Adjustment to Surgery (Ileostomy)  Goal: Optimal Coping  Outcome: Ongoing, Progressing     Problem: Bleeding (Ileostomy)  Goal: Absence of Bleeding  Outcome: Ongoing, Progressing     Problem: Fluid, Electrolyte and Nutrition Imbalance (Ileostomy)  Goal: Fluid, Electrolyte and Nutrition Balance  Outcome: Ongoing, Progressing     Problem: Infection (Ileostomy)  Goal: Absence of Infection Signs and Symptoms  Outcome: Ongoing, Progressing     Problem: Ongoing Anesthesia Effects (Ileostomy)  Goal: Anesthesia/Sedation Recovery  Outcome: Ongoing, Progressing     Problem: Pain (Ileostomy)  Goal:  Acceptable Pain Control  Outcome: Ongoing, Progressing     Problem: Postoperative Nausea and Vomiting (Ileostomy)  Goal: Nausea and Vomiting Relief  Outcome: Ongoing, Progressing     Problem: Postoperative Stoma Care (Ileostomy)  Goal: Optimal Stoma Healing  Outcome: Ongoing, Progressing     Problem: Postoperative Urinary Retention (Ileostomy)  Goal: Effective Urinary Elimination  Outcome: Ongoing, Progressing     Problem: Respiratory Compromise (Ileostomy)  Goal: Effective Oxygenation and Ventilation  Outcome: Ongoing, Progressing

## 2023-03-06 NOTE — HOSPITAL COURSE
Admitted thru ER with prolapsed stoma on 1/31.  After medical and nutritional optimizing she was brought to surgery on 2/6/23.    Ms. Hart is a 67 year old woman with a history of CAD and PCI in 1/2022, HF (EF 20%), HTN, ICD, chronic hypoxemic respiratory failure, MDD, debility, severe malnutrition who underwent an open takedown of colovesical and colovaginal fistuli (two separate areas) with sigmoid colectomy, partial cecectomy and appendectomy, drainage of intra-abdominal abscess and end colostomy (with cholecystectomy by Dr. 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 She was admitted for pre-operative optimization and now takedown of her fistula with possible abdominal wall reconstruction. The benefits, risks, and alternatives were discussed with the patient, they were given the opportunity to ask questions and they elected to proceed with operative intervention after signing written consent.  Nephrolgy consulted for hyponatremia who recommended close monitoring of laba and I&O, slowly resume home meds.  Cardiology consulted for hx of HF and low EF,  PT/OT for mobilization.  2/7 PCA cont, advance to low fiber, refusing PT?OT and not getting out of bed, social service for discharge planning, appreciate help from nephrolgy, hospital med, cards for caring for multiple comorbidiies.  2/8 post op ileus with no output from colostomy, n/v, made NPO.  2/9 decrease pca due to some confusion, small amt of output from stoma.  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  PCA dced, advance to reg diet, not coooperative with care, refuisng meds and refusing to ambulate or care for self.  2/13 Posiitve for UTI, Cefpodoxime for 5 days.  2/14 ostomy functioanl, wound probed with Q  tip and hematoma drained, wound packing begun.  2/18 wound packing continued with some purulent drainage.  2/22 CT:     CT Abd/Pelvis (2/22/2023):   1. Organized rim enhancing fluid collection along the right anterior abdominal wall with extension through the abdominal wall into the subcutaneous soft tissues.  The collection abuts the ascending and transverse colon.  2. Continued suspected small bowel to rectal fistula.  3. Stable splenic infarction.  4. Right-sided ureteral stent in place.  No hydronephrosis.  5. Cardiomegaly.  6. 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  Avelox begun and wound packing continued BID.    Discharge planning difficult due to pt's insistence she only wants to go to the [facility], insurance will not apporve [facility], pt very difficult to deal with as her story changes almost every day.  After several long discussions with Dr. Cardozo pt agreeable to go the an outside snf.  Pt accepted to snf 3/6/23 and transferred to stable condition

## 2023-03-06 NOTE — NURSING
Pt AAOx4, VS  RA. Midline incision intact, dressing in place .  Ostomy CDI with 100 cc output. Regular diet tolerating well.  Pt and son educated on discharge plan of care to transfer to SNF for wound care and rehab. Re[port called to staff per MD instruction. Pt iv removed and pt left facility via stretcher free of any acute distress.

## 2023-03-06 NOTE — PLAN OF CARE
SSC met with patient/family at bedside. Patient experience rounding completed and reviewed the following.     Do you know your discharge plan? Yes or No,    If yes, what is the plan? (Home, Home Health, Rehab, SNF, LTAC, or Other)     SNF    If you are discharging home, do you have help at home? Yes or No         Help at SNF    Do you think you will need help at home at discharge? Yes or No         Not going home    Have you discussed your needs and preferences with your SW/CM? Yes or No        Yes    Assigned SW/CM notified of any patient/family needs or concerns.     Marbella Meza [facility]  Case Management  [phone]

## 2023-03-06 NOTE — PLAN OF CARE
Problem: Adult Inpatient Plan of Care  Goal: Plan of Care Review  Outcome: Ongoing, Progressing  Goal: Patient-Specific Goal (Individualized)  Outcome: Ongoing, Progressing  Goal: Absence of Hospital-Acquired Illness or Injury  Outcome: Ongoing, Progressing  Goal: Optimal Comfort and Wellbeing  Outcome: Ongoing, Progressing  Goal: Readiness for Transition of Care  Outcome: Ongoing, Progressing     Problem: Fall Injury Risk  Goal: Absence of Fall and Fall-Related Injury  Outcome: Ongoing, Progressing     Problem: Impaired Wound Healing  Goal: Optimal Wound Healing  Outcome: Ongoing, Progressing     Problem: Infection  Goal: Absence of Infection Signs and Symptoms  Outcome: Ongoing, Progressing     Problem: Skin Injury Risk Increased  Goal: Skin Health and Integrity  Outcome: Ongoing, Progressing     Problem: Coping Ineffective  Goal: Effective Coping  Outcome: Ongoing, Progressing     Problem: Pain Acute  Goal: Acceptable Pain Control and Functional Ability  Outcome: Ongoing, Progressing     Problem: Adjustment to Surgery (Ileostomy)  Goal: Optimal Coping  Outcome: Ongoing, Progressing     Problem: Bleeding (Ileostomy)  Goal: Absence of Bleeding  Outcome: Ongoing, Progressing     Problem: Fluid, Electrolyte and Nutrition Imbalance (Ileostomy)  Goal: Fluid, Electrolyte and Nutrition Balance  Outcome: Ongoing, Progressing     Problem: Infection (Ileostomy)  Goal: Absence of Infection Signs and Symptoms  Outcome: Ongoing, Progressing     Problem: Pain (Ileostomy)  Goal: Acceptable Pain Control  Outcome: Ongoing, Progressing     Problem: Postoperative Nausea and Vomiting (Ileostomy)  Goal: Nausea and Vomiting Relief  Outcome: Ongoing, Progressing     Problem: Postoperative Stoma Care (Ileostomy)  Goal: Optimal Stoma Healing  Outcome: Ongoing, Progressing     Problem: Postoperative Urinary Retention (Ileostomy)  Goal: Effective Urinary Elimination  Outcome: Ongoing, Progressing     Problem: Respiratory Compromise  (Ileostomy)  Goal: Effective Oxygenation and Ventilation  Outcome: Ongoing, Progressing

## 2023-03-06 NOTE — PT/OT/SLP PROGRESS
Physical Therapy Treatment    Patient Name:  Odette Hart   MRN:  [id number]    Recommendations:     Discharge Recommendations: home health PT  Discharge Equipment Recommendations: none  Barriers to discharge: None    Assessment:     Odette Hart is a 67 y.o. female admitted with a medical diagnosis of Colocutaneous fistula.  She presents with the following impairments/functional limitations: weakness, impaired endurance, impaired self care skills, impaired functional mobility, pain Pt. cooperative and tolerated treatment fairly well, but progress limited by c/o abd. pain.    Rehab Prognosis: Fair; patient would benefit from acute skilled PT services to address these deficits and reach maximum level of function.    Recent Surgery: Procedure(s) (LRB):  REVISION, COLOSTOMY (N/A)  INSERTION, CATHETER, URETER, BILATERAL (Left)  CYSTOSCOPY, WITH URETERAL STENT INSERTION (Right)  DEBRIDEMENT, ABDOMEN (N/A)  COLECTOMY, PARTIAL  LYSIS, ADHESIONS (N/A) 28 Days Post-Op    Plan:     During this hospitalization, patient to be seen 3 x/week to address the identified rehab impairments via gait training, therapeutic activities, therapeutic exercises and progress toward the following goals:    Plan of Care Expires:  04/09/23    Subjective     Chief Complaint: abd. Discomfort   Patient/Family Comments/goals: pt. Agreeable to PT  Pain/Comfort:  Pain Rating 1:  (pt. did not rate)  Location - Side 1: Left  Location - Orientation 1: generalized  Location 1: abdomen  Pain Addressed 1: Pre-medicate for activity, Reposition, Distraction, Cessation of Activity, Nurse notified      Objective:     Communicated with nursing prior to session.  Patient found supine with peripheral IV, colostomy upon PT entry to room.     General Precautions: Standard, fall  Orthopedic Precautions: N/A  Braces: N/A  Respiratory Status: Room air     Functional Mobility:  Bed Mobility:     Rolling Right: supervision  Scooting: supervision  Supine to Sit:  supervision  Transfers:     Sit to Stand:  stand by assistance with rolling walker  Gait: 60' x2 trials with RW and SBA with slow, steady gait. Pt. had seated rest break between gait trials.  Balance: fair+      AM-PAC 6 CLICK MOBILITY  Turning over in bed (including adjusting bedclothes, sheets and blankets)?: 4  Sitting down on and standing up from a chair with arms (e.g., wheelchair, bedside commode, etc.): 4  Moving from lying on back to sitting on the side of the bed?: 4  Moving to and from a bed to a chair (including a wheelchair)?: 4  Need to walk in hospital room?: 3  Climbing 3-5 steps with a railing?: 3  Basic Mobility Total Score: 22       Treatment & Education:  Discussed pt.'s progress, goals, and POC. [place] [facility] Pt. Performed renata-care on her own.    Patient left supine with all lines intact and call button in reach..    GOALS:   Multidisciplinary Problems       Physical Therapy Goals          Problem: Physical Therapy    Goal Priority Disciplines Outcome Goal Variances Interventions   Physical Therapy Goal     PT, PT/OT Ongoing, Progressing     Description: Goals to be met by: 2/15/2023, extended to 3/1/2023, extended to 3/15/2023    Patient will increase functional independence with mobility by performin. Gait  x 250 feet with Supervision using LRAD.   2. Ascend/descend 4 stair with right Handrails Supervision using no Assistive device.   3. Sit to Stand with Supervision using LRAD                       Time Tracking:     PT Received On: 23  PT Start Time: 941     PT Stop Time: 958  PT Total Time (min): 17 min     Billable Minutes: Gait Training 17    Treatment Type: Treatment  PT/PTA: PT     PTA Visit Number: 0     2023

## 2023-03-06 NOTE — PT/OT/SLP PROGRESS
Occupational Therapy      Patient Name:  Odette Hart   MRN:  [id number]    Patient not seen today secondary to medical team present during time of attempt. This therapist unable to return for another attempt  . Will follow-up for therapy as scheduled.    3/6/2023

## 2023-03-06 NOTE — PLAN OF CARE
NURSING HOME ORDERS    03/06/2023  [facility]  [address] [place]  [address]  [facility] [address]  Dept: [phone]  Loc: [phone]     Admit to Nursing Home:  Skilled Nursing Facility    Diagnoses:  Active Hospital Problems    Diagnosis  POA    *Colocutaneous fistula [K63.2]  Yes    Goals of care, counseling/discussion [Z71.89]  Not Applicable    Gross hematuria [R31.0]  No    Ileus [K56.7]  No    Encounter for palliative care [Z51.5]  Not Applicable    Severe protein-calorie malnutrition [E43]  Yes    Hypotension [I95.9]  Clinically Undetermined    Hyponatremia [E87.1]  Yes    Hypertension [I10]  Yes     2012: Diagnosed.      Hypercholesterolemia [E78.00]  Yes    Coronary artery disease [I25.10]  Yes     2012: [place]: Anterior MI. LAD stent.  1/21/2022: NSTEMI. Troponin 9.  1/24/2022: [facility]: Cath: LAD: Proximal stent patent. LCX: Proximal 80%. LCX: Dominant. Moderate disease. LCX: HEVER 2.75 x 18 mm. Distal dissection. HEVER 2.75 x 22 mm.        Ischemic cardiomyopathy [I25.5]  Yes     1/24/2022: Echo: Severely enlarged left ventricle with severe systolic dysfunction. EF 20%.         Automatic implantable cardioverter-defibrillator in situ [Z95.810]  Yes     2014: [employer] ICD.           Resolved Hospital Problems    Diagnosis Date Resolved POA    Acute cystitis with hematuria [N30.01] 02/17/2023 Unknown    Preop examination [Z01.818] 02/06/2023 Not Applicable       Patient is homebound due to:  Colocutaneous fistula    Allergies:  Review of patient's allergies indicates:   Allergen Reactions    Augmentin [amoxicillin-pot clavulanate] Swelling     Not allergic to amoxicillin just a derivative of augmentin    Lisinopril Other (See Comments)     Fluid around heart    Shellfish containing products Anaphylaxis     Pt.states she is allergic to SEAFOOD since the age of 13    Levaquin [levofloxacin] Other (See Comments)     Very bad joint pain    Clindamycin Palpitations      Chest pain       Vitals:  Every shift    Diet: regular diet    Activities:   Up in a chair each morning as tolerated, Ambulate with assistance to bathroom, and Activity as tolerated    Goals of Care Treatment Preferences:  Code Status: Full Code        What is most important right now is to focus on spending time at home, avoiding the hospital, remaining as independent as possible, symptom/pain control, improvement in condition but with limits to invasive therapies.  Accordingly, we have decided that the best plan to meet the patient's goals includes  discussions and palliative care consult. Patient is also interested in completing a living will and POA paperwork..      Labs:  weekly CBC, BMP, prealb    Nursing Precautions:  Pressure ulcer prevention    Consults:   PT to evaluate and treat- 5 times a week, OT to evaluate and treat- 5 times a week, Wound Care, and Nutrition to evaluate and recommend diet     Miscellaneous Care: Colostomy Care:  Empty bag every shift and prn  CHF Care: Daily Weight with notification of MD/NP of 2lb or > increase in 24 hours    v/s and O2 sat every shift    Oxygen as needed for sats <90%    Report abnormal breath sounds to MD/NP    Edema checks q shift- notify MD/NP of increased edema    Task segmentation by nursing for daily care to decrease exertion    CHF education to include diet ,medication, and CHF flags for MD notification                   Wound care:  Irrigate wound with normal saline and Pack abd wound with iodofoam bid    Medications: Discontinue all previous medication orders, if any. See new list below.     Medication List        START taking these medications      albuterol 90 mcg/actuation inhaler  Commonly known as: PROVENTIL/VENTOLIN HFA  Inhale 2 puffs into the lungs every 6 (six) hours as needed for Wheezing. Rescue     oxyCODONE 5 MG immediate release tablet  Commonly known as: ROXICODONE  Take 1 tablet (5 mg total) by mouth every 4 (four) hours as needed  for Pain.     polyethylene glycol 17 gram/dose powder  Commonly known as: GLYCOLAX  Use cap to measure 17 g, then mix in liquid and drink by mouth once daily.            CHANGE how you take these medications      furosemide 20 MG tablet  Commonly known as: LASIX  Take 1 tablet (20 mg total) by mouth once daily.  What changed:   medication strength  how much to take            CONTINUE taking these medications      acetaminophen 500 MG tablet  Commonly known as: TYLENOL  Take 2 tablets (1,000 mg total) by mouth every 8 (eight) hours as needed for Pain.     ALPRAZolam 0.5 MG tablet  Commonly known as: XANAX  Take 1 tablet (0.5 mg total) by mouth nightly as needed for Anxiety.     aspirin 81 MG EC tablet  Commonly known as: ECOTRIN  Take 1 tablet (81 mg total) by mouth once daily.     clopidogreL 75 mg tablet  Commonly known as: PLAVIX  Take 1 tablet (75 mg total) by mouth once daily.     empagliflozin 10 mg tablet  Commonly known as: JARDIANCE  Take 1 tablet (10 mg total) by mouth once daily.     ENSURE HIGH PROTEIN Liqd  Generic drug: food supplemt, lactose-reduced  Take 240 mLs by mouth 2 (two) times a day.     ezetimibe 10 mg tablet  Commonly known as: ZETIA  Take 1 tablet (10 mg total) by mouth once daily.     famotidine 20 MG tablet  Commonly known as: PEPCID  Take 1 tablet (20 mg total) by mouth once daily.     fluticasone propionate 50 mcg/actuation nasal spray  Commonly known as: FLONASE  1 spray (50 mcg total) by Each Nostril route once daily.     metoprolol succinate 25 MG 24 hr tablet  Commonly known as: TOPROL-XL  Take 1 tablet (25 mg total) by mouth once daily.     simethicone 80 MG chewable tablet  Commonly known as: MYLICON  Take 1 tablet (80 mg total) by mouth every 8 (eight) hours as needed for Flatulence.            STOP taking these medications      docusate sodium 100 MG capsule  Commonly known as: COLACE     HYDROcodone-acetaminophen 5-325 mg per tablet  Commonly known as: NORCO     losartan 25 MG  tablet  Commonly known as: COZAAR     ondansetron 8 MG Tbdl  Commonly known as: ZOFRAN-ODT     spironolactone 25 MG tablet  Commonly known as: ALDACTONE            ASK your doctor about these medications      nitroGLYCERIN 0.4 MG SL tablet  Commonly known as: NITROSTAT  Place 1 tablet (0.4 mg total) under the tongue every 5 (five) minutes as needed for Chest pain.                Immunizations Administered as of 3/6/2023       Name Date Dose VIS Date Route Exp Date    COVID-19, MRNA, LN-S, PF (Moderna) 8/10/2021 -- -- Intramuscular --    Site: Left arm     Lot: [id number]                    _________________________________  LITA DUBON MD  03/06/2023

## 2023-03-07 NOTE — PLAN OF CARE
[address] [place]  Discharge Final Note    Primary Care Provider: Braxton Barragan MD    Expected Discharge Date: 3/6/2023    Final Discharge Note (most recent)       Final Note - 03/07/23 1248          Final Note    Assessment Type Final Discharge Note     Anticipated Discharge Disposition Skilled Nursing Facility     Hospital Resources/Appts/Education Provided Provided patient/caregiver with written discharge plan information        Post-Acute Status    Post-Acute Authorization Placement     Post-Acute Placement Status Set-up Complete/Auth obtained     Coverage PHN     Patient choice form signed by patient/caregiver List with quality metrics by geographic area provided     Discharge Delays None known at this time                     Important Message from Medicare  Important Message from Medicare regarding Discharge Appeal Rights: Given to patient/caregiver, Explained to patient/caregiver, Signed/date by patient/caregiver     Date IMM was signed: 03/06/23  Time IMM was signed: 1017    Contact Info       [facility]   Specialty: Home Health Services, Home Therapy Services, Home Living Aide Services    [address] [address]   Phone: [phone]       Next Steps: Follow up    Instructions: Home Health          Pt d/c via EMS to [facility]. No additional needs at this time.     Marbella López LCSW  Case Management/[facility]  [phone]

## 2023-03-10 PROBLEM — N13.30 HYDRONEPHROSIS OF RIGHT KIDNEY: Status: ACTIVE | Noted: 2023-01-01

## 2023-03-10 NOTE — PROGRESS NOTES
"[facility] - Urology [facility]   Resident Clinic Note  Staff - Will Cho MD    SUBJECTIVE:     Chief Complaint: Right hydronephrosis    History of Present Illness:  Odette Hart is a 67 y.o. female who presents to clinic for right hydronephrosis. She is new to our clinic, previously seen while inpatient.    On 5/9/2022 she underwent open cholecystectomy and takedown of colovesical and colovaginal fistulae (two separate areas) with sigmoid colectomy, partial cecectomy and appendectomy, drainage of intra-abdominal abscess and end colostomy. 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 On 02/06/23 she underwent colocutaneous fistula takedown w/ transverse and descending colectomy with intraoperative placement of a right ureteral stent for new right hydroureteronephrosis to right distal ureter noted on pre-operative imaging, as well as intraoperative retrograde pyelogram which demonstrated narrowing of the distal right ureter. No filling defects noted. Subsequent CT AP w/o on 2/22/23 shows resolution of right hydronephrosis with good positioning of ureteral stent.    She has a history of HTN, CAD s/p PCI c/b HFrEF (EF 10%), MDD, debility, diverticulitis, and severe malnutrition.     Today she complains of right flank pain, which she states has been present and intermittent since her surgery on 02/06.  Endorses urgency with incontinence, intermittent dysuria. Denies frequency, fevers, chills, nausea and vomiting. She is currently at [facility].    Anticoagulation:  Yes ASA 81 mg, Plavix    OBJECTIVE:     Estimated body mass index is 20.02 kg/m² as calculated from the following:    Height as of this encounter: 5' 3" (1.6 m).    Weight as of this encounter: 51.3 kg (113 lb).    Vital Signs (Most Recent)  Pulse: 81 (03/10/23 1403)  BP: (!) 90/59 (03/10/23 1403)    Physical Exam  Constitutional:       General: She is not in acute distress.     Appearance: " She is ill-appearing.      Comments: Cachetic, malnourished appearing    HENT:      Mouth/Throat:      Mouth: Mucous membranes are dry.   Eyes:      Extraocular Movements: Extraocular movements intact.   Cardiovascular:      Rate and Rhythm: Normal rate.   Pulmonary:      Effort: Pulmonary effort is normal.   Abdominal:      General: There is no distension.      Palpations: Abdomen is soft.      Tenderness: There is abdominal tenderness (around colostomy).      Comments: Midline abdominal incision dressed with island dressing  Viable appearing LLQ colostomy    Skin:     General: Skin is warm and dry.   Neurological:      Mental Status: She is alert and oriented to person, place, and time.     Lab Results   Component Value Date    BUN 10 03/06/2023    CREATININE 0.6 03/06/2023    WBC 2.54 (L) 03/06/2023    HGB 9.9 (L) 03/06/2023    HCT 33.7 (L) 03/06/2023     03/06/2023    AST 36 02/06/2023    ALT 23 02/06/2023    ALKPHOS 116 02/06/2023    ALBUMIN 1.7 (L) 02/06/2023    HGBA1C 5.0 08/26/2022        Urine dip showed 250 blood, + leukocyte esterase, + nitrite, 1000 glucose, trace protein.    Imaging  See HPI. All images independently reviewed and interpreted.    ASSESSMENT     1. Hydronephrosis of right kidney      PLAN:   1. Hydronephrosis of right kidney    Extensive discussion with patient and her son Don about options for management of her right ureteral obstruction and hydronephrosis. Discussed stent exchanges vs. nephrostomy tube placement and exchanges. Discussed she would need clearance for anesthesia to undergo right ureteral stent exchange and right RPG. Also discussed the risk of failure of stent exchange and failure of stent due to extrinsic compression, in which case we would recommend nephrostomy tube. She is adamant about undergoing confirmatory imaging in regard to her right ureteral obstruction.  At this time, the patient and her son favor the possibility of stent exchanges pending clearance for  anesthesia.       Schedule cystoscopy with right RPG, ureteral stent removal vs exchange in 1 month  Send to Dr. Galeano for clearance  Urine culture      Anival Lama MD

## 2023-03-13 NOTE — TELEPHONE ENCOUNTER
----- Message from Concha Galdamez sent at 3/13/2023  3:43 PM CDT -----  Contact: pt  Pt requesting call back RE: would like to speak in regards to her appt on 3/10 she was seen in the physician clinic. Please call to discuss      Confirmed contact below:  Contact Name:Odette Hart  Phone Number: [phone]

## 2023-03-22 NOTE — PROGRESS NOTES
Called patient to answer her questions. Patient has been having gross hematuria, lower back pain, and dysuria since ureteral stent placement. She expressed desire her ureteral stent removed and not replaced. I explained that this would leave her right kidney obstructed, and that the potential complications of this could include urinary tract infections, renal failure requiring dialysis, sepsis, and death. I explained that gross hematuria is always possible with a stent present; as patient has no difficulty voiding, do not anticipate that this degree of bleeding is significant. For lower back pain and dysuria, Rx for pyridium and Ditropan XL were sent to patient's preferred pharmacy. Also advised patient to get urine culture at SNF to ensure no infection. Attempt was made to contact patient's caregivers in SNF, but unable to reach anyone; will attempt again this week, but patient also reports plan for discharge from facility. Patient wishes to proceed with cystoscopy with right retrograde pyelogram and right ureteral stent removal vs exchange.

## 2023-03-22 NOTE — TELEPHONE ENCOUNTER
I received a message from the nurse @ Dr. Covington's office stating the patient couldn't or wouldn't schedule her pre-op clearance appt because her ride is her son and he is sick right now. So the Patient is been put on hold for her surgery. Message was sent to the Resident Dr's Derrick Renee and madiha, as this is there case.

## 2023-03-24 NOTE — PROGRESS NOTES
All assessed by me and Dr Cardozo and irrigated wound and redressed with strip packing   Replaced colostomy pouch with a closed end per pt request.    She is going home from SNF tomorrow and very happy, she looks very good today .

## 2023-03-24 NOTE — PROGRESS NOTES
[facility]  Colon and Rectal Surgery    [address]  [place], [place]  Tel: [phone]  Fax: [phone]  [email or web address]/   MD Wseley Fink MD Brian Kann, MD W. Forrest Johnston, MD Matthew Giglia, MD Jennifer Paruch, MD William Kethman, MD Danielle Kay, MD     Patient name: Odette Hart   YOB: 1955   MRN: [id number]  Date of visit: 03/23/2023    It was a pleasure seeing Ms. Hart in the Colon and Rectal Surgery clinic here at [facility].     Ms. 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 Epstein) on 5/9/2022, complicated by readmission for management of shortness of breath/HF exacerbation, severe malnutrition, and wound care. She has been discharged and is here for follow-up.    Pathology  A.   APPENDIX, APPENDECTOMY:          -Benign appendix, without diagnostic abnormality.          -Attached portion of benign colon, without diagnostic abnormality.          -Proximal margin, negative for tumor.          -Negative for malignancy.   B.   COLON, SIGMOID AND PORTION OF CECUM, SIGMOID COLECTOMY:          -Portions of benign colon showing diverticulitis with associated mural wall thickening, serosal hemorrhage and adhesions (clinical, colovesical/colovaginal fistula).          -Surgical margins, viable benign colon, without diagnostic abnormality.          -Negative for malignancy.   C.   GALLBLADDER, CHOLECYSTECTOMY:          -Mild chronic cholecystitis.     She is walking on her own more, still spirits are low. She is developing more of an  appetite - but not perfect. She is having only intermittent nausea, no vomiting. She gained 3 lbs. She has been seeing wound care and they are marking some progress on the sacral wound.     8/12/2022  Here today - she was recently readmitted for heart failure management which has been a recurrent issue for her post-operatively (dfificult to manage) - our service followed to assist in wound care. She is going to see our ostomy care nurses today as well. She seems to be doing better - wearing makeup today. She is having seldom nausea, no vomiting. Her wounds do seem to be making progress. She is still titrating cardiac medications.    10/11/2022  Here for follow-up. Continues to undergo aggressive wound care of abdominal and sacral wounds - slowed due to co-morbidities. She is understandable frustrated by the pace of healing of her wounds     Albumin 1.6-2.3    3/24/2023  Here today for follow-up - she has been at SNF since discharge on 3/6/2023. 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 In review of notes, she has continued to have intermittent hematuria being managed by Dr. Cho after having right ureteral stent placed for pre-existing hydronephrosis. She is doing well - she is leaving SNF tomorrow, eating well, no nausea or vomiting, still undergoing wound care.     The patient was informed of the availability of a certified  without charge. A certified  was not necessary for this visit.    Review of Systems  See pertinent review of systems above    Past Medical History:   Diagnosis Date    Acute coronary syndrome     Acute exacerbation of chronic obstructive pulmonary disease (COPD) 3/23/2022    Allergy     Arthritis     Cardiomyopathy     CHF (congestive heart failure)     Coronary artery disease     Coronary artery disease of native artery of native heart with stable angina pectoris 4/19/2021     2022: [facility]: Cath: LAD: Proximal stent patent. LCX: Proximal 80%. LCX: Dominant. Moderate disease. LCX: HEVER 2.75 x 18 mm. Distal dissection. HEVER 2.75 x 22 mm.     Diverticulitis     Diverticulosis     Familial hypercholesterolemia 2022    Former smoker 2022    Heart attack     Heart disease     History of myocardial infarction 2022    Hyperlipidemia     Hypertension     Hypertension 2022    ICD (implantable cardioverter-defibrillator) in place     Non-ST elevation myocardial infarction (NSTEMI) 2019     Past Surgical History:   Procedure Laterality Date    APPENDECTOMY N/A 2022    Procedure: APPENDECTOMY;  Surgeon: Rafa Cardozo MD;  Location: [facility] OR 2ND FLR;  Service: Colon and Rectal;  Laterality: N/A;    ATRIAL CARDIAC PACEMAKER INSERTION      CECECTOMY N/A 2022    Procedure: EXCISION, CECUM;  Surgeon: Rafa Cardozo MD;  Location: [facility] OR [facility] FLR;  Service: Colon and Rectal;  Laterality: N/A;     SECTION      CHOLECYSTECTOMY N/A 2022    Procedure: CHOLECYSTECTOMY;  Surgeon: Doug Epstein MD;  Location: [facility] OR [facility] FLR;  Service: General;  Laterality: N/A;    COLONOSCOPY N/A 2022    Procedure: COLONOSCOPY;  Surgeon: Rafa Cardozo MD;  Location: [facility] OR 2ND FLR;  Service: Colon and Rectal;  Laterality: N/A;    COLOSTOMY  2022    Procedure: CREATION, COLOSTOMY;  Surgeon: Rafa Cardozo MD;  Location: [facility] OR [facility] FLR;  Service: Colon and Rectal;;    CORONARY STENT PLACEMENT      CYSTOSCOPY W/ URETERAL STENT PLACEMENT Right 2023    Procedure: CYSTOSCOPY, WITH URETERAL STENT INSERTION;  Surgeon: Felice Laureano MD;  [facility];  Service: Urology;  Laterality: Right;  6X24 stent    DEBRIDEMENT, ABDOMEN N/A 2023    Procedure: DEBRIDEMENT, ABDOMEN;  Surgeon: Lucio Sanchez DO;  Location: [facility] OR 2ND FLR;  Service: Plastics;  Laterality: N/A;  abdominal wall, and fascia.    INSERTION OF URETERAL CATHETER  Left 2/6/2023    Procedure: INSERTION, CATHETER, URETER, BILATERAL;  Surgeon: Felice Laureano MD;  [facility];  Service: Urology;  Laterality: Left;    LEFT HEART CATHETERIZATION Left 1/24/2022    Procedure: CATHETERIZATION, HEART, LEFT;  Surgeon: Yohannes Cordova MD;  Location: [facility] CATH LAB;  Service: Cardiology;  Laterality: Left;    LYSIS OF ADHESIONS N/A 2/6/2023    Procedure: LYSIS, ADHESIONS;  Surgeon: Rafa Cardozo MD;  Location: [facility] OR [facility] FLR;  Service: Colon and Rectal;  Laterality: N/A;    REVISION COLOSTOMY N/A 2/6/2023    Procedure: REVISION, COLOSTOMY;  Surgeon: Rafa Cardozo MD;  Location: [facility] OR [facility] FLR;  Service: Colon and Rectal;  Laterality: N/A;    SUBTOTAL COLECTOMY  2/6/2023    Procedure: COLECTOMY, PARTIAL;  Surgeon: Rafa Cardozo MD;  [facility];  Service: Colon and Rectal;;     Family History   Problem Relation Age of Onset    Heart disease Mother     Emphysema Father     Heart attack Brother      Social History     Tobacco Use    Smoking status: Former     Packs/day: 0.50     Types: Cigarettes     Start date: 2/4/1976     Quit date: 12/4/2012     Years since quitting: 10.3    Smokeless tobacco: Never   Substance Use Topics    Alcohol use: No    Drug use: No     Review of patient's allergies indicates:   Allergen Reactions    Augmentin [amoxicillin-pot clavulanate] Swelling     Not allergic to amoxicillin just a derivative of augmentin    Lisinopril Other (See Comments)     Fluid around heart    Shellfish containing products Anaphylaxis     Pt.states she is allergic to SEAFOOD since the age of 13    Levaquin [levofloxacin] Other (See Comments)     Very bad joint pain    Clindamycin Palpitations     Chest pain       Current Outpatient Medications on File Prior to Visit   Medication Sig Dispense Refill    acetaminophen (TYLENOL) 500 MG tablet Take 2 tablets (1,000 mg total) by mouth every 8 (eight) hours as needed for Pain.  0    albuterol  (PROVENTIL/VENTOLIN HFA) 90 mcg/actuation inhaler Inhale 2 puffs into the lungs every 6 (six) hours as needed for Wheezing. Rescue 18 g 1    ALPRAZolam (XANAX) 0.5 MG tablet Take 1 tablet (0.5 mg total) by mouth nightly as needed for Anxiety. 30 tablet 0    aspirin (ECOTRIN) 81 MG EC tablet Take 1 tablet (81 mg total) by mouth once daily. 90 tablet 3    clopidogreL (PLAVIX) 75 mg tablet Take 1 tablet (75 mg total) by mouth once daily. 90 tablet 3    diclofenac sodium (VOLTAREN) 1 % Gel Apply topically 4 (four) times daily.      empagliflozin (JARDIANCE) 10 mg tablet Take 1 tablet (10 mg total) by mouth once daily. 90 tablet 3    ezetimibe (ZETIA) 10 mg tablet Take 1 tablet (10 mg total) by mouth once daily. 90 tablet 3    famotidine (PEPCID) 20 MG tablet Take 1 tablet (20 mg total) by mouth once daily. 30 tablet 0    fluticasone propionate (FLONASE) 50 mcg/actuation nasal spray 1 spray (50 mcg total) by Each Nostril route once daily. 9.9 mL 1    food supplemt, lactose-reduced (ENSURE HIGH PROTEIN) Liqd Take 240 mLs by mouth 2 (two) times a day. 60 each 3    furosemide (LASIX) 20 MG tablet Take 1 tablet (20 mg total) by mouth once daily. 30 tablet 11    metoprolol succinate (TOPROL-XL) 25 MG 24 hr tablet Take 1 tablet (25 mg total) by mouth once daily. 90 tablet 3    nitroGLYCERIN (NITROSTAT) 0.4 MG SL tablet Place 1 tablet (0.4 mg total) under the tongue every 5 (five) minutes as needed for Chest pain. 25 tablet 3    oxybutynin (DITROPAN-XL) 10 MG 24 hr tablet Take 1 tablet (10 mg total) by mouth once daily. 30 tablet 1    oxyCODONE (ROXICODONE) 5 MG immediate release tablet Take 1 tablet (5 mg total) by mouth every 4 (four) hours as needed for Pain. 15 tablet 0    phenazopyridine (PYRIDIUM) 100 MG tablet Take 1 tablet (100 mg total) by mouth 3 (three) times daily as needed for Pain. 30 tablet 0    polyethylene glycol (GLYCOLAX) 17 gram/dose powder Use cap to measure 17 g, then mix in liquid and drink by mouth once  daily. 510 g 0    simethicone (MYLICON) 80 MG chewable tablet Take 1 tablet (80 mg total) by mouth every 8 (eight) hours as needed for Flatulence. 120 tablet 5    [DISCONTINUED] pantoprazole (PROTONIX) 40 MG tablet Take 1 tablet (40 mg total) by mouth once daily. 30 tablet 1    [DISCONTINUED] prasugreL (EFFIENT) 10 mg Tab Take 1 tablet (10 mg total) by mouth once daily. 90 tablet 0     No current facility-administered medications on file prior to visit.     Physical Examination  LMP  (LMP Unknown)      A chaperone was present for the physical examination.    Constitutional: well developed, no cough, some SOB - at baseline  Head: Normocephalic, no lesions, without obvious abnormality  Eye: Normal external eye, conjunctiva, and lids  Cardiovascular: regular rate and regular rhythm  Respiratory: normal air entry  Gastrointestinal: soft, non-tender, non-distended            Musculoskeletal: full range of motion without pain  Neurologic: alert, oriented, normal speech, no focal findings or movement disorder noted  Psychiatric: appropriate, normal mood    Assessment and Plan of Care    Thank you again for referring Ms. Hart to my care. In summary, Ms. Hart is a 67 year old woman presenting for post-operative follow-up. We discussed treatment options and have provided the following recommendations:    Appreciate multi-discipline coordinated care regarding co-morbidities  Appreciate Urology evaluation  Colonoscopy in 1 year (7/2023)  - will need to re-visit this in light of recent events  Repeat CT AP with IV and oral contrast for evaluation of abdominal fluid collection and to guide midline wound care  Follow-up in 2 weeks with [facility]OC[facility] and me    Please do not hesitate to contact me if you have any questions.      Rafa Cardozo MD - Staff Surgeon  Department of Colon & Rectal Surgery  [facility]

## 2023-04-03 NOTE — TELEPHONE ENCOUNTER
----- Message from Pamela Job sent at 9/11/2019 10:20 AM CDT -----  Contact: Patient  Type:  Patient Requesting Referral    Who Called:  Odette, patient  Does the patient already have the specialty appointment scheduled?:  No  If yes, what is the date of that appointment?:  N/A  Referral to What Specialty:  Hand and Wrist Specialist  Reason for Referral:  Right wrist fracture not healing  Does the patient want the referral with a specific physician?:  Dr Will Ross  Is the specialist an [facility]?:  No  Patient Requesting a Call Back?:  Yes  Best Call Back Number:  [phone]  Additional Information:   Please advise. Thanks.    
Called pt. No answer no vm available  
Patient is requesting a referral to Dr.Eric Ross hand specialist.  
Home

## 2023-04-03 NOTE — PROGRESS NOTES
Called patient to discuss setup with Dr. Galeano for clearance prior to cysto/RPG and stent exchange, currently scheduled for 04/19/23. Patient adamant she does not want her stent replaced, citing extreme discomfort since having the stent placed, despite taking pyridium and Ditropan. I discussed extensively the potential complications of unmanaged renal obstruction, including urinary tract infections, renal failure requiring dialysis, sepsis, and death.  The patient expressed clear understanding of these risks. Despite these risks, she would like her stent removed in clinic procedures. She will not need clearance with Dr. Galeano for this.

## 2023-04-03 NOTE — TELEPHONE ENCOUNTER
I sent a Message to Ms Beverley Lamas to see if she was able to get the patient scheduled for her Pre-Op Clearance Appt.

## 2023-04-03 NOTE — TELEPHONE ENCOUNTER
From: Guadalupe Chamberlain MA   Sent: 4/3/2023   2:45 PM CDT   To: Allison Talavera LPN     Good Evening, I was told to let you know to schedule this patient for a Resident clinic for this Friday for a stent removal. Per Thelma Kothari .      I spoke with patient who agreed to cysto/stent removal on 4-14-23.

## 2023-04-04 NOTE — PROGRESS NOTES
"Subjective     Patient ID: Odette Hart is a 67 y.o. female.    Chief Complaint: Colostomy, Post-op Evaluation, and Wound Check    This is a second post op visit since closure of  ECF and prolapse 23.  Her son brings her today, she is not as positive or upbeat today, actually seems very discouraged and this seems to be from her urology appt that she had recently regarding her stents.    She has been home 2 weeks now, son is taking her food but he said her appetite has fallen off in past few days .     Review of Systems   Constitutional:  Negative for appetite change and fever.   HENT: Negative.     Respiratory: Negative.     Cardiovascular:  Negative for chest pain and leg swelling.   Gastrointestinal:  Positive for constipation.        Colostomy , pt using miralax to help prevent constipation    Genitourinary:  Positive for hematuria.   Integumentary:  Positive for wound.        Objective     Physical Exam  Pulmonary:      Effort: Pulmonary effort is normal. No respiratory distress.   Abdominal:      General: Bowel sounds are normal. There is no distension.   Skin:     General: Skin is warm and dry.   Neurological:      Mental Status: She is alert.   Psychiatric:      Comments: Teary today and negative    Her son is with her    Her colostomy pch I removed is wrong size, she needs larger opening 13/8  she will have to cut her precuts at home  She is drinking BOOST 2-3 a day   WOUND is progressing  DR CARMONA HERE TO SEE HER WOUND AND DISCUSS HER CT RESULTS   Will continue with flush of wound but use SANARA RX instead and pack loosely with strip packing   The top hole has closed and can no longer express any drainage.    Sutures removed today     New  Orders sent   F wound care by nurse  Flush wound with SANARA solution diluted to 1/2 strength ( make few cc mixture per day due to 3 day   )  Pack each hole with very little 1/4 " PLAIN strip   Cover wound as desired    I sent pt with solution and " spoke with her HHN   Elaine Cardozo     Assessment and Plan     Problem List Items Addressed This Visit    None  Visit Diagnoses       Nonhealing nonsurgical wound    -  Primary    Attention to colostomy              Discussed importance of diet for her and protein intake,    Removed sutures  Continue wound care with [place]  spoke with her HHN  Iraj from [facility]  ( [phone])  Faxed new orders to same   FU in few weeks

## 2023-04-05 NOTE — TELEPHONE ENCOUNTER
Received message from Dr. Cardozo yesterday to reach out to patient regarding tentative plans for right ureteral stent. Patient stated that she has no questions at this time. She is currently scheduled to have her stent removed in clinic Friday 4/14/23.    Patient is aware that we will plan to check Cr and renal US shortly after stent removal. Depending on results, we may recommend re-insertion of stent or percutaneous nephrostomy tube.    Patient is adamant that she wants the stent removed and would not want PCNT. She understands the risk of infection, sepsis, kidney damage, dialysis, kidney loss and even death.    No further questions or concerns at this time.

## 2023-04-11 NOTE — TELEPHONE ENCOUNTER
----- Message from Martha Cruz sent at 4/11/2023  9:24 AM CDT -----  Contact: Pt [phone]  Caller is requesting an earlier appointment then we can schedule.  Caller is requesting a message be sent to the provider.  If this is for urgent care symptoms, did you offer other providers at this location, providers at other locations, or [facility]? (yes, no, n/a):  na  If this is for the patients physical, did you offer to schedule next available and put on wait list, or to see NP or PA for their physical?  (yes, no, n/a):  na  When is the next available appointment with their provider:  6/6/2023  Reason for the appointment:  surgery follow up/Colon  Patient preference of timeframe to be scheduled:  ASAP  Would the patient like a call back, or a response through their [facility] portal?:   Call back    Comments:    Wound care has been coming out to take care of the wound, but needs to be seen ASAP    Please call and advise.    Thank You

## 2023-04-12 NOTE — TELEPHONE ENCOUNTER
Spoke with patient regarding need for appointment. Pt wanted to discuss what urology told her. Pt agreed a virtual appointment would work. Appointment scheduled for this afternoon. Pt verbalized understanding

## 2023-04-12 NOTE — TELEPHONE ENCOUNTER
Spoke with patient states patient states she has a upcoming procedure on Friday to have stent removed.  Patient wants to know can she take pain medication prior to procedure.  Patient states she is still having blood in her urine which has not decreased.  She also states she has pain in her bladder region.  Patient is urinating a few drops and states her bladder feels full.  Advised patient to go to ED per protocol.  Patient verbalized understanding.   Reason for Disposition   [1] Unable to urinate (or only a few drops) > 4 hours AND [2] bladder feels very full (e.g., palpable bladder or strong urge to urinate)   [1] SEVERE pelvic pain AND [2] present > 1 hour    Additional Information   Negative: Shock suspected (e.g., cold/pale/clammy skin, too weak to stand, low BP, rapid pulse)   Negative: Sounds like a life-threatening emergency to the triager   Negative: Recent back or abdominal injury   Negative: Recent genital injury   Negative: Shock suspected (e.g., cold/pale/clammy skin, too weak to stand, low BP, rapid pulse)   Negative: Passed out (i.e., lost consciousness, collapsed and was not responding)   Negative: Sounds like a life-threatening emergency to the triager   Negative: [1] SEVERE pelvic pain (e.g., excruciating) AND [2] vomiting    Protocols used: Urine - Blood In-A-AH, Pelvic Pain - Female-A-AH

## 2023-04-13 NOTE — TELEPHONE ENCOUNTER
"I spoke with patient, who stated she is "scared" about her cysto/stent removal tomorrow, I explained the process to her and she stated she felt better. She stated she is ready for this to come out she is having a lot of pain from the stent , I advised her to ask  for pyridium tomorrow.  "

## 2023-04-14 NOTE — PROCEDURES
Odette Hart is a 67 y.o. female patient.    Temp: 97.7 °F (36.5 °C) (04/14/23 1403)  Pulse: 73 (04/14/23 1403)  Resp: 18 (04/14/23 1403)  BP: (!) 91/51 (04/14/23 1403)  Weight: 50.9 kg (112 lb 3.2 oz) (04/14/23 1403)       Cystoscopy w/ stent removal    Date/Time: 4/14/2023 2:54 PM  Performed by: Kraig Sheets MD  Authorized by: Anival Lama MD   Preparation: Patient was prepped and draped in the usual sterile fashion.  Local anesthesia used: yes    Anesthesia:  Local anesthesia used: yes    Sedation:  Patient sedated: no    Patient tolerance: patient tolerated the procedure well with no immediate complications  Comments: Prior to stent removal, I had a long discussion with the patient about the risks of taking her stent out without a full workup for her hydronephrosis. I discussed potential complications including repeat hydronephrosis, pyelonephritis, sepsis, increased risk of dialysis, loss of kidney, and possible death. She expressed understanding. If any problem arises in the near future, she understands that a nephrostomy tube is the best course of action given her intolerance of her stent.    Will see her in 1 month with a renal ultrasound and BMP.        4/14/2023    The patient was advised that it is against medical advice that this stent is removed without a better definition as to what is happening with the right ureter.  If she cannot tolerate the stent, and has persistent or worsening of the hydronephrosis we will recommend percutaneous nephrostomy.  She expressed understanding.

## 2023-04-14 NOTE — PATIENT INSTRUCTIONS
What to Expect After a Cystoscopy with Stent Removal  For the next 24-48 hours, you may feel a mild burning when you urinate. This burning is normal and expected. Drink plenty of water to dilute the urine to help relieve the burning sensation. You may also see a small amount of blood in your urine after the procedure.    Unless you are already taking antibiotics, you may be given an antibiotic after the test to prevent infection.    Signs and Symptoms to Report  Call the [facility] at [phone] if you develop any of the following:  Fever of 101 degrees or higher  Chills or persistent bleeding  Inability to urinate    After hours or on weekends, you may reach a urology resident on call at this number: [phone].

## 2023-04-16 NOTE — PROGRESS NOTES
Subjective:    The patient location is: home  The chief complaint leading to consultation is: abd pain dysuria lower back pain    Visit type: audiovisual    Face to Face time with patient: 25   minutes of total time spent on the encounter, which includes face to face time and non-face to face time preparing to see the patient (eg, review of tests), Obtaining and/or reviewing separately obtained history, Documenting clinical information in the electronic or other health record, Independently interpreting results (not separately reported) and communicating results to the patient/family/caregiver, or Care coordination (not separately reported).         Each patient to whom he or she provides medical services by telemedicine is:  (1) informed of the relationship between the physician and patient and the respective role of any other health care provider with respect to management of the patient; and (2) notified that he or she may decline to receive medical services by telemedicine and may withdraw from such care at any time.    Notes:     Patient ID: Odette Hart is a 67 y.o. female.    Chief Complaint: No chief complaint on file.    HPI  patient visit today for follow-up from hospital review records status post: Rectal fistula repair and new colostomy present of  the right ureter with severe pain and gross hematuria history of ischemic cardiomyopathy with CHF ejection fraction 20% COPD patient not happy with the care she received in the hospital she wants to change to new surgeon she deny fever nausea vomiting still have any abdominal pain lower back pain and dysuria and hematuria patient want the stent to be removed she states that before surgery she does not have any problem with her urine not sure was done is put in place patient complained of dysuria hematuria no fever chill no night sweats  Review of Systems    Objective:      Physical Exam  Constitutional:       General: She is not in acute  distress.     Appearance: Normal appearance.   Pulmonary:      Effort: Pulmonary effort is normal.   Abdominal:      Tenderness: There is abdominal tenderness.   Neurological:      Mental Status: She is alert and oriented to person, place, and time.   Psychiatric:         Thought Content: Thought content normal.         Judgment: Judgment normal.      Comments: Patient frustrated medical care she want to be transferred to a new group of surgeon current status post colon rectal fistula repair and repair of the colostomy dysuria hematuria probably UTI stand right ureter probably from hydronephrosis       Assessment:       1. Acute cystitis without hematuria    2. Postoperative abdominal pain    3. Chronic midline low back pain without sciatica    4. Pain due to ureteral stent, subsequent encounter    5. Hydronephrosis of right kidney        Plan:       Acute cystitis without hematuria  -     sulfamethoxazole-trimethoprim 800-160mg (BACTRIM DS) 800-160 mg Tab; Take 1 tablet by mouth 2 (two) times daily. for 10 days  Dispense: 20 tablet; Refill: 0  -     Urinalysis; Future; Expected date: 04/12/2023  -     CULTURE, URINE    Postoperative abdominal pain  Comments:  Patient considerable pain in the abdomen and via and rt lower back since surgery  Orders:  -     HYDROcodone-acetaminophen (NORCO) 5-325 mg per tablet; Take 1 tablet by mouth every 12 (twelve) hours as needed for Pain.  Dispense: 25 tablet; Refill: 0  -     CBC Auto Differential; Future; Expected date: 04/15/2023  -     Comprehensive Metabolic Panel; Future; Expected date: 04/15/2023  -     Sedimentation rate; Future; Expected date: 04/15/2023    Chronic midline low back pain without sciatica  -     HYDROcodone-acetaminophen (NORCO) 5-325 mg per tablet; Take 1 tablet by mouth every 12 (twelve) hours as needed for Pain.  Dispense: 25 tablet; Refill: 0    Pain due to ureteral stent, subsequent encounter  -     HYDROcodone-acetaminophen (NORCO) 5-325 mg per  tablet; Take 1 tablet by mouth every 12 (twelve) hours as needed for Pain.  Dispense: 25 tablet; Refill: 0    Hydronephrosis of right kidney  -     CBC Auto Differential; Future; Expected date: 04/15/2023  -     Comprehensive Metabolic Panel; Future; Expected date: 04/15/2023  -     Sedimentation rate; Future; Expected date: 04/15/2023        Medication List with Changes/Refills   New Medications    HYDROCODONE-ACETAMINOPHEN (NORCO) 5-325 MG PER TABLET    Take 1 tablet by mouth every 12 (twelve) hours as needed for Pain.    SULFAMETHOXAZOLE-TRIMETHOPRIM 800-160MG (BACTRIM DS) 800-160 MG TAB    Take 1 tablet by mouth 2 (two) times daily. for 10 days   Current Medications    ACETAMINOPHEN (TYLENOL) 500 MG TABLET    Take 2 tablets (1,000 mg total) by mouth every 8 (eight) hours as needed for Pain.    ALBUTEROL (PROVENTIL/VENTOLIN HFA) 90 MCG/ACTUATION INHALER    Inhale 2 puffs into the lungs every 6 (six) hours as needed for Wheezing. Rescue    ALPRAZOLAM (XANAX) 0.5 MG TABLET    Take 1 tablet (0.5 mg total) by mouth nightly as needed for Anxiety.    ASPIRIN (ECOTRIN) 81 MG EC TABLET    Take 1 tablet (81 mg total) by mouth once daily.    CLOPIDOGREL (PLAVIX) 75 MG TABLET    Take 1 tablet (75 mg total) by mouth once daily.    DICLOFENAC SODIUM (VOLTAREN) 1 % GEL    Apply topically 4 (four) times daily.    EMPAGLIFLOZIN (JARDIANCE) 10 MG TABLET    Take 1 tablet (10 mg total) by mouth once daily.    EZETIMIBE (ZETIA) 10 MG TABLET    Take 1 tablet (10 mg total) by mouth once daily.    FAMOTIDINE (PEPCID) 20 MG TABLET    Take 1 tablet (20 mg total) by mouth once daily.    FLUTICASONE PROPIONATE (FLONASE) 50 MCG/ACTUATION NASAL SPRAY    1 spray (50 mcg total) by Each Nostril route once daily.    FOOD SUPPLEMT, LACTOSE-REDUCED (ENSURE HIGH PROTEIN) LIQD    Take 240 mLs by mouth 2 (two) times a day.    FUROSEMIDE (LASIX) 20 MG TABLET    Take 1 tablet (20 mg total) by mouth once daily.    METOPROLOL SUCCINATE (TOPROL-XL) 25 MG  24 HR TABLET    Take 1 tablet (25 mg total) by mouth once daily.    NITROGLYCERIN (NITROSTAT) 0.4 MG SL TABLET    Place 1 tablet (0.4 mg total) under the tongue every 5 (five) minutes as needed for Chest pain.    OXYBUTYNIN (DITROPAN-XL) 10 MG 24 HR TABLET    Take 1 tablet (10 mg total) by mouth once daily.    PHENAZOPYRIDINE (PYRIDIUM) 100 MG TABLET    Take 1 tablet (100 mg total) by mouth 3 (three) times daily as needed for Pain.    SIMETHICONE (MYLICON) 80 MG CHEWABLE TABLET    Take 1 tablet (80 mg total) by mouth every 8 (eight) hours as needed for Flatulence.   Discontinued Medications    OXYCODONE (ROXICODONE) 5 MG IMMEDIATE RELEASE TABLET    Take 1 tablet (5 mg total) by mouth every 6 (six) hours as needed for Pain.

## 2023-04-17 NOTE — TELEPHONE ENCOUNTER
Patient requesting to change her appt on 5/19 to a virtual due to travel distance; please advise.  I spoke with patient and advised her that we don't see virtual patients in resident clinic.

## 2023-04-27 NOTE — TELEPHONE ENCOUNTER
Called pt; no answer; left message informing patient I was calling to confirm pt's virtual EAWV today at 3:30pm and to see if pt needed any help with setting up for virtual visit or e-pre check and to login 15 minutes prior to scheduled appt; left my name & number for pt to return my call if pt had any questions or concerns; sent message through portal

## 2023-05-01 NOTE — PROGRESS NOTES
[facility]  Colon and Rectal Surgery    [address]  [place], [place]  Tel: [phone]  Fax: [phone]  [email or web address]   MD Wesley Fink MD Brian Kann, MD W. Forrest Johnston, MD Matthew Giglia, MD Jennifer Paruch, MD William Kethman, MD Danielle Kay, MD     Patient name: Odette Hart   YOB: 1955   MRN: [id number]  Date of visit: 05/01/2023    It was a pleasure seeing Ms. Hart in the Colon and Rectal Surgery clinic here at [facility].     Ms. 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 Epstein) on 5/9/2022, complicated by readmission for management of shortness of breath/HF exacerbation, severe malnutrition, and wound care. She has been discharged and is here for follow-up.    Pathology  A.   APPENDIX, APPENDECTOMY:          -Benign appendix, without diagnostic abnormality.          -Attached portion of benign colon, without diagnostic abnormality.          -Proximal margin, negative for tumor.          -Negative for malignancy.   B.   COLON, SIGMOID AND PORTION OF CECUM, SIGMOID COLECTOMY:          -Portions of benign colon showing diverticulitis with associated mural wall thickening, serosal hemorrhage and adhesions (clinical, colovesical/colovaginal fistula).          -Surgical margins, viable benign colon, without diagnostic abnormality.          -Negative for malignancy.   C.   GALLBLADDER, CHOLECYSTECTOMY:          -Mild chronic cholecystitis.     She is walking on her own more, still spirits are low. She is developing more of an  appetite - but not perfect. She is having only intermittent nausea, no vomiting. She gained 3 lbs. She has been seeing wound care and they are marking some progress on the sacral wound.     8/12/2022  Here today - she was recently readmitted for heart failure management which has been a recurrent issue for her post-operatively (dfificult to manage) - our service followed to assist in wound care. She is going to see our ostomy care nurses today as well. She seems to be doing better - wearing makeup today. She is having seldom nausea, no vomiting. Her wounds do seem to be making progress. She is still titrating cardiac medications.    10/11/2022  Here for follow-up. Continues to undergo aggressive wound care of abdominal and sacral wounds - slowed due to co-morbidities. She is understandable frustrated by the pace of healing of her wounds     Albumin 1.6-2.3    3/24/2023  Here today for follow-up - she has been at SNF since discharge on 3/6/2023. 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 In review of notes, she has continued to have intermittent hematuria being managed by Dr. Cho after having right ureteral stent placed for pre-existing hydronephrosis. She is doing well - she is leaving SNF tomorrow, eating well, no nausea or vomiting, still undergoing wound care.     5/2/2023  Here for post-operative follow-up - takedown of colocutaneous fistula on 2/6/2023. Seen last on 4/4/2023 - still healing wounds at that time. She underwent a cystoscopy with stent removal with Dr. Torres on 4/14/2023. They plan to do a follow-up BMP and renal ultrasound. She is overall doing better but still having abdominal pain with dressing changes. She is eating and not having any nausea. She takes Miralax for bowel function.    The patient was informed of the availability of a certified  without charge. A certified  was not  necessary for this visit.    Review of Systems  See pertinent review of systems above    Past Medical History:   Diagnosis Date    Acute coronary syndrome     Acute exacerbation of chronic obstructive pulmonary disease (COPD) 3/23/2022    Allergy     Arthritis     Cardiomyopathy     CHF (congestive heart failure)     Coronary artery disease     Coronary artery disease of native artery of native heart with stable angina pectoris 2021: [facility]: Cath: LAD: Proximal stent patent. LCX: Proximal 80%. LCX: Dominant. Moderate disease. LCX: HEVER 2.75 x 18 mm. Distal dissection. HEVER 2.75 x 22 mm.     Diverticulitis     Diverticulosis     Familial hypercholesterolemia 2022    Former smoker 2022    Heart attack     Heart disease     History of myocardial infarction 2022    Hyperlipidemia     Hypertension     Hypertension 2022    ICD (implantable cardioverter-defibrillator) in place     Non-ST elevation myocardial infarction (NSTEMI) 2019     Past Surgical History:   Procedure Laterality Date    APPENDECTOMY N/A 2022    Procedure: APPENDECTOMY;  Surgeon: Rafa Cardozo MD;  [facility];  Service: Colon and Rectal;  Laterality: N/A;    ATRIAL CARDIAC PACEMAKER INSERTION      CECECTOMY N/A 2022    Procedure: EXCISION, CECUM;  Surgeon: Rafa Cardozo MD;  Location: [facility] OR 2ND FLR;  Service: Colon and Rectal;  Laterality: N/A;     SECTION      CHOLECYSTECTOMY N/A 2022    Procedure: CHOLECYSTECTOMY;  Surgeon: Doug Epstein MD;  [facility];  Service: General;  Laterality: N/A;    COLONOSCOPY N/A 2022    Procedure: COLONOSCOPY;  Surgeon: Rafa Cardozo MD;  Location: [facility] OR 2ND FLR;  Service: Colon and Rectal;  Laterality: N/A;    COLOSTOMY  2022    Procedure: CREATION, COLOSTOMY;  Surgeon: Rafa Cardozo MD;  Location: [facility] OR 2ND FLR;  Service: Colon and Rectal;;    CORONARY STENT PLACEMENT      CYSTOSCOPY W/  URETERAL STENT PLACEMENT Right 2/6/2023    Procedure: CYSTOSCOPY, WITH URETERAL STENT INSERTION;  Surgeon: Felice Laureano MD;  [facility];  Service: Urology;  Laterality: Right;  6X24 stent    DEBRIDEMENT, ABDOMEN N/A 2/6/2023    Procedure: DEBRIDEMENT, ABDOMEN;  Surgeon: Lucio Sanchez DO;  [facility];  Service: Plastics;  Laterality: N/A;  abdominal wall, and fascia.    INSERTION OF URETERAL CATHETER Left 2/6/2023    Procedure: INSERTION, CATHETER, URETER, BILATERAL;  Surgeon: Felice Laureano MD;  [facility];  Service: Urology;  Laterality: Left;    LEFT HEART CATHETERIZATION Left 1/24/2022    Procedure: CATHETERIZATION, HEART, LEFT;  Surgeon: Yohannes Cordova MD;  Location: [facility] C[facility] LAB;  Service: Cardiology;  Laterality: Left;    LYSIS OF ADHESIONS N/A 2/6/2023    Procedure: LYSIS, ADHESIONS;  Surgeon: Rafa Cardozo MD;  [facility];  Service: Colon and Rectal;  Laterality: N/A;    REVISION COLOSTOMY N/A 2/6/2023    Procedure: REVISION, COLOSTOMY;  Surgeon: Rafa Cardozo MD;  [facility];  Service: Colon and Rectal;  Laterality: N/A;    SUBTOTAL COLECTOMY  2/6/2023    Procedure: COLECTOMY, PARTIAL;  Surgeon: Rafa Cardozo MD;  [facility];  Service: Colon and Rectal;;     Family History   Problem Relation Age of Onset    Heart disease Mother     Emphysema Father     Heart attack Brother      Social History     Tobacco Use    Smoking status: Former     Packs/day: 0.50     Types: Cigarettes     Start date: 2/4/1976     Quit date: 12/4/2012     Years since quitting: 10.4    Smokeless tobacco: Never   Substance Use Topics    Alcohol use: No    Drug use: No     Review of patient's allergies indicates:   Allergen Reactions    Augmentin [amoxicillin-pot clavulanate] Swelling     Not allergic to amoxicillin just a derivative of augmentin    Lisinopril Other (See Comments)     Fluid around heart     Shellfish containing products Anaphylaxis     Pt.states she is allergic to SEAFOOD since the age of 13    Levaquin [levofloxacin] Other (See Comments)     Very bad joint pain    Clindamycin Palpitations     Chest pain       Current Outpatient Medications on File Prior to Visit   Medication Sig Dispense Refill    acetaminophen (TYLENOL) 500 MG tablet Take 2 tablets (1,000 mg total) by mouth every 8 (eight) hours as needed for Pain.  0    albuterol (PROVENTIL/VENTOLIN HFA) 90 mcg/actuation inhaler Inhale 2 puffs into the lungs every 6 (six) hours as needed for Wheezing. Rescue 18 g 1    ALPRAZolam (XANAX) 0.5 MG tablet Take 1 tablet (0.5 mg total) by mouth nightly as needed for Anxiety. 30 tablet 0    aspirin (ECOTRIN) 81 MG EC tablet Take 1 tablet (81 mg total) by mouth once daily. 90 tablet 3    clopidogreL (PLAVIX) 75 mg tablet Take 1 tablet (75 mg total) by mouth once daily. 90 tablet 3    diclofenac sodium (VOLTAREN) 1 % Gel Apply topically 4 (four) times daily.      empagliflozin (JARDIANCE) 10 mg tablet Take 1 tablet (10 mg total) by mouth once daily. 90 tablet 3    ezetimibe (ZETIA) 10 mg tablet Take 1 tablet (10 mg total) by mouth once daily. 90 tablet 3    famotidine (PEPCID) 20 MG tablet Take 1 tablet (20 mg total) by mouth once daily. 30 tablet 0    fluticasone propionate (FLONASE) 50 mcg/actuation nasal spray 1 spray (50 mcg total) by Each Nostril route once daily. 9.9 mL 1    food supplemt, lactose-reduced (ENSURE HIGH PROTEIN) Liqd Take 240 mLs by mouth 2 (two) times a day. 60 each 3    furosemide (LASIX) 20 MG tablet Take 1 tablet (20 mg total) by mouth once daily. 30 tablet 11    HYDROcodone-acetaminophen (NORCO) 5-325 mg per tablet Take 1 tablet by mouth every 12 (twelve) hours as needed for Pain. 25 tablet 0    metoprolol succinate (TOPROL-XL) 25 MG 24 hr tablet Take 1 tablet (25 mg total) by mouth once daily. 90 tablet 3    nitroGLYCERIN (NITROSTAT) 0.4 MG SL tablet Place 1 tablet (0.4 mg total)  under the tongue every 5 (five) minutes as needed for Chest pain. 25 tablet 3    oxybutynin (DITROPAN-XL) 10 MG 24 hr tablet Take 1 tablet (10 mg total) by mouth once daily. 30 tablet 1    phenazopyridine (PYRIDIUM) 100 MG tablet Take 1 tablet (100 mg total) by mouth 3 (three) times daily as needed for Pain. 30 tablet 0    simethicone (MYLICON) 80 MG chewable tablet Take 1 tablet (80 mg total) by mouth every 8 (eight) hours as needed for Flatulence. 120 tablet 5    [DISCONTINUED] pantoprazole (PROTONIX) 40 MG tablet Take 1 tablet (40 mg total) by mouth once daily. 30 tablet 1    [DISCONTINUED] prasugreL (EFFIENT) 10 mg Tab Take 1 tablet (10 mg total) by mouth once daily. 90 tablet 0     No current facility-administered medications on file prior to visit.     Physical Examination  LMP  (LMP Unknown)      A chaperone was present for the physical examination.    Constitutional: well developed, no cough, some SOB - at baseline  Head: Normocephalic, no lesions, without obvious abnormality  Eye: Normal external eye, conjunctiva, and lids  Cardiovascular: regular rate and regular rhythm  Respiratory: normal air entry  Gastrointestinal: soft, wound irrigated and changed, remainder of stitches removed         Musculoskeletal: full range of motion without pain  Neurologic: alert, oriented, normal speech, no focal findings or movement disorder noted  Psychiatric: appropriate, normal mood    Assessment and Plan of Care    Thank you again for referring Ms. Hart to my care. In summary, Ms. Hart is a 67 year old woman presenting for post-operative follow-up. We discussed treatment options and have provided the following recommendations:    Appreciate multi-discipline coordinated care regarding co-morbidities  Colonoscopy in 1 year (7/2023)  Follow-up in 1 month, plan to obtain CT AP in advance of her appointment  Going to see [facility] today, should also see them in 1 month  Norco prescribed for pain with dressing  changes    Please do not hesitate to contact me if you have any questions.      Rafa Cardozo MD - Staff Surgeon  Department of Colon & Rectal Surgery  [facility]

## 2023-05-02 NOTE — PROGRESS NOTES
The remote monitoring information downloaded from the implantable cardioverter defibrillator's company's web site was reviewed in detail and the information was assessed in the context of the patient's clinical status. The downlowded information will be scanned into this encounter.    Yohannes Cordova M.D.

## 2023-05-02 NOTE — PROGRESS NOTES
"Subjective     Patient ID: Odette Hart is a 67 y.o. female.    Chief Complaint: No chief complaint on file.    This is a third post op visit since closure of  ECF and prolapse 23.  Her son brings her today, she is not as positive or upbeat today.  She has been home , son is taking her food but he said her appetite has fallen off in past few days .     Review of Systems   Constitutional:  Negative for appetite change and fever.   HENT: Negative.     Respiratory: Negative.     Cardiovascular:  Negative for chest pain and leg swelling.   Gastrointestinal:  Positive for constipation.        Colostomy , pt using miralax to help prevent constipation    Integumentary:  Positive for wound.        Objective     Physical Exam  Pulmonary:      Effort: Pulmonary effort is normal. No respiratory distress.   Abdominal:      General: Bowel sounds are normal. There is no distension.   Skin:     General: Skin is warm and dry.   Neurological:      Mental Status: She is alert.   Psychiatric:      Comments: Teary today and negative    Her son is with her           23  No trouble with ostomy/pouch at this time.  She is drinking BOOST 2-3 a day   WOUND is progressing    Will continue with flush of wound but use SANARA RX instead and pack loosely with strip packing   The top hole has closed and can no longer express any drainage.    Sutures removed today     Continue  Orders:   MWF wound care by nurse  Flush wound with SANARA solution diluted to 1/2 strength ( make few cc mixture per day due to 3 day   )  Pack each hole with very little 1/4 " PLAIN strip   Cover wound as desired    Assessment and Plan     Problem List Items Addressed This Visit    None      Discussed importance of diet for her and protein intake,    Continue wound care with Sanara  spoke with her HHN  Iraj from [facility]  ( [phone])  FU in 1 month          "

## 2023-05-10 PROBLEM — N17.9 AKI (ACUTE KIDNEY INJURY): Status: ACTIVE | Noted: 2023-01-01

## 2023-05-10 PROBLEM — T81.30XA ABDOMINAL WOUND DEHISCENCE: Chronic | Status: ACTIVE | Noted: 2023-01-01

## 2023-05-10 PROBLEM — I48.91 ATRIAL FIBRILLATION: Status: ACTIVE | Noted: 2023-01-01

## 2023-05-10 PROBLEM — L89.151 DECUBITUS ULCER OF SACRAL REGION, STAGE 1: Status: ACTIVE | Noted: 2023-01-01

## 2023-05-10 PROBLEM — R94.31 PROLONGED Q-T INTERVAL ON ECG: Status: ACTIVE | Noted: 2023-01-01

## 2023-05-10 PROBLEM — I50.43 ACUTE ON CHRONIC COMBINED SYSTOLIC AND DIASTOLIC CONGESTIVE HEART FAILURE: Status: ACTIVE | Noted: 2022-01-01

## 2023-05-10 PROBLEM — R11.2 NAUSEA AND VOMITING: Status: ACTIVE | Noted: 2023-01-01

## 2023-05-10 NOTE — ASSESSMENT & PLAN NOTE
Nausea and vomiting x 2 days with acute SOB x 1 day requiring 2 L O2  New T wave inversion in lateral leads   No Trop I on arrival, no chest pain  Informed On call cardiologist Dr Wade     - Appreciate Dr Wade's input  - Trop I and EKG at 1900hrs

## 2023-05-10 NOTE — SUBJECTIVE & OBJECTIVE
Past Medical History:   Diagnosis Date    Acute coronary syndrome     Acute exacerbation of chronic obstructive pulmonary disease (COPD) 3/23/2022    Allergy     Arthritis     Cardiomyopathy     CHF (congestive heart failure)     Coronary artery disease     Coronary artery disease of native artery of native heart with stable angina pectoris 2021: [facility]: Cath: LAD: Proximal stent patent. LCX: Proximal 80%. LCX: Dominant. Moderate disease. LCX: HEVER 2.75 x 18 mm. Distal dissection. HEVER 2.75 x 22 mm.     Diverticulitis     Diverticulosis     Familial hypercholesterolemia 2022    Former smoker 2022    Heart attack     Heart disease     History of myocardial infarction 2022    Hyperlipidemia     Hypertension     Hypertension 2022    ICD (implantable cardioverter-defibrillator) in place     Non-ST elevation myocardial infarction (NSTEMI) 2019       Past Surgical History:   Procedure Laterality Date    APPENDECTOMY N/A 2022    Procedure: APPENDECTOMY;  Surgeon: Rafa Cardozo MD;  [facility];  Service: Colon and Rectal;  Laterality: N/A;    ATRIAL CARDIAC PACEMAKER INSERTION      CECECTOMY N/A 2022    Procedure: EXCISION, CECUM;  Surgeon: Rafa Cardozo MD;  Location: [facility] OR 2ND FLR;  Service: Colon and Rectal;  Laterality: N/A;     SECTION      CHOLECYSTECTOMY N/A 2022    Procedure: CHOLECYSTECTOMY;  Surgeon: Doug Epstein MD;  Location: [facility] OR 2ND FLR;  Service: General;  Laterality: N/A;    COLONOSCOPY N/A 2022    Procedure: COLONOSCOPY;  Surgeon: Rafa Cardozo MD;  Location: [facility] OR 2ND FLR;  Service: Colon and Rectal;  Laterality: N/A;    COLOSTOMY  2022    Procedure: CREATION, COLOSTOMY;  Surgeon: Rafa Cardozo MD;  Location: [facility] OR 2ND FLR;  Service: Colon and Rectal;;    CORONARY STENT PLACEMENT      CYSTOSCOPY W/ URETERAL STENT PLACEMENT Right 2023    Procedure: CYSTOSCOPY, WITH URETERAL STENT  INSERTION;  Surgeon: Felice Laureano MD;  Location: [facility] OR [facility] FLR;  Service: Urology;  Laterality: Right;  6X24 stent    DEBRIDEMENT, ABDOMEN N/A 2/6/2023    Procedure: DEBRIDEMENT, ABDOMEN;  Surgeon: Lucio Sanchez DO;  Location: [facility] OR [facility] FLR;  Service: Plastics;  Laterality: N/A;  abdominal wall, and fascia.    INSERTION OF URETERAL CATHETER Left 2/6/2023    Procedure: INSERTION, CATHETER, URETER, BILATERAL;  Surgeon: Felice Laureano MD;  [facility];  Service: Urology;  Laterality: Left;    LEFT HEART CATHETERIZATION Left 1/24/2022    Procedure: CATHETERIZATION, HEART, LEFT;  Surgeon: Yohannes Cordova MD;  Location: [facility] CATH LAB;  Service: Cardiology;  Laterality: Left;    LYSIS OF ADHESIONS N/A 2/6/2023    Procedure: LYSIS, ADHESIONS;  Surgeon: Rafa Cardozo MD;  Location: [facility] OR [facility] FLR;  Service: Colon and Rectal;  Laterality: N/A;    REVISION COLOSTOMY N/A 2/6/2023    Procedure: REVISION, COLOSTOMY;  Surgeon: Rafa Cardozo MD;  [facility];  Service: Colon and Rectal;  Laterality: N/A;    SUBTOTAL COLECTOMY  2/6/2023    Procedure: COLECTOMY, PARTIAL;  Surgeon: Rafa Cardozo MD;  [facility];  Service: Colon and Rectal;;       Review of patient's allergies indicates:   Allergen Reactions    Augmentin [amoxicillin-pot clavulanate] Swelling     Not allergic to amoxicillin just a derivative of augmentin    Lisinopril Other (See Comments)     Fluid around heart    Shellfish containing products Anaphylaxis     Pt.states she is allergic to SEAFOOD since the age of 13    Levaquin [levofloxacin] Other (See Comments)     Very bad joint pain    Clindamycin Palpitations     Chest pain       No current facility-administered medications on file prior to encounter.     Current Outpatient Medications on File Prior to Encounter   Medication Sig    acetaminophen (TYLENOL) 500 MG tablet Take 2 tablets (1,000 mg total) by mouth every 8 (eight) hours  as needed for Pain.    albuterol (PROVENTIL/VENTOLIN HFA) 90 mcg/actuation inhaler Inhale 2 puffs into the lungs every 6 (six) hours as needed for Wheezing. Rescue    ALPRAZolam (XANAX) 0.5 MG tablet Take 1 tablet (0.5 mg total) by mouth nightly as needed for Anxiety.    aspirin (ECOTRIN) 81 MG EC tablet Take 1 tablet (81 mg total) by mouth once daily.    clopidogreL (PLAVIX) 75 mg tablet Take 1 tablet (75 mg total) by mouth once daily.    diclofenac sodium (VOLTAREN) 1 % Gel Apply topically 4 (four) times daily.    empagliflozin (JARDIANCE) 10 mg tablet Take 1 tablet (10 mg total) by mouth once daily.    ezetimibe (ZETIA) 10 mg tablet Take 1 tablet (10 mg total) by mouth once daily.    famotidine (PEPCID) 20 MG tablet Take 1 tablet (20 mg total) by mouth once daily.    fluticasone propionate (FLONASE) 50 mcg/actuation nasal spray 1 spray (50 mcg total) by Each Nostril route once daily.    food supplemt, lactose-reduced (ENSURE HIGH PROTEIN) Liqd Take 240 mLs by mouth 2 (two) times a day.    furosemide (LASIX) 20 MG tablet Take 1 tablet (20 mg total) by mouth once daily.    HYDROcodone-acetaminophen (NORCO) 5-325 mg per tablet Take 1 tablet by mouth every 12 (twelve) hours as needed for Pain.    HYDROcodone-acetaminophen (NORCO) 5-325 mg per tablet Take 1 tablet by mouth every 6 (six) hours as needed for Pain.    metoprolol succinate (TOPROL-XL) 25 MG 24 hr tablet Take 1 tablet (25 mg total) by mouth once daily.    nitroGLYCERIN (NITROSTAT) 0.4 MG SL tablet Place 1 tablet (0.4 mg total) under the tongue every 5 (five) minutes as needed for Chest pain.    simethicone (MYLICON) 80 MG chewable tablet Take 1 tablet (80 mg total) by mouth every 8 (eight) hours as needed for Flatulence.    [DISCONTINUED] pantoprazole (PROTONIX) 40 MG tablet Take 1 tablet (40 mg total) by mouth once daily.    [DISCONTINUED] prasugreL (EFFIENT) 10 mg Tab Take 1 tablet (10 mg total) by mouth once daily.     Family History       Problem  Relation (Age of Onset)    Emphysema Father    Heart attack Brother    Heart disease Mother          Tobacco Use    Smoking status: Former     Packs/day: 0.50     Types: Cigarettes     Start date: 2/4/1976     Quit date: 12/4/2012     Years since quitting: 10.4    Smokeless tobacco: Never   Substance and Sexual Activity    Alcohol use: No    Drug use: No    Sexual activity: Not on file     Review of Systems   Constitutional:  Positive for appetite change and fatigue. Negative for chills and fever.   HENT:  Negative for congestion and sore throat.    Eyes:  Negative for pain and redness.   Respiratory:  Positive for shortness of breath. Negative for cough and wheezing.    Cardiovascular:  Negative for chest pain and leg swelling.   Gastrointestinal:  Positive for abdominal pain, constipation, nausea and vomiting. Negative for abdominal distention and diarrhea.   Genitourinary:  Positive for decreased urine volume. Negative for difficulty urinating and dysuria.   Musculoskeletal:  Positive for gait problem. Negative for back pain.   Skin:  Positive for wound (abdomen, ostomy, sacral). Negative for rash.   Neurological:  Negative for weakness, light-headedness and numbness.   Psychiatric/Behavioral:  Negative for behavioral problems.    Objective:     Vital Signs (Most Recent):  Temp: 98.6 °F (37 °C) (05/10/23 1335)  Pulse: 97 (05/10/23 1653)  Resp: (!) 26 (05/10/23 1653)  BP: (!) 92/45 (05/10/23 1653)  SpO2: 100 % (05/10/23 1653) Vital Signs (24h Range):  Temp:  [98.6 °F (37 °C)] 98.6 °F (37 °C)  Pulse:  [] 97  Resp:  [24-26] 26  SpO2:  [98 %-100 %] 100 %  BP: ()/(45-69) 92/45     Weight: 50.8 kg (112 lb)  Body mass index is 19.84 kg/m².     Physical Exam  Vitals and nursing note reviewed.   Constitutional:       General: She is not in acute distress.     Appearance: She is not ill-appearing.      Comments: Dry mucous membranes    HENT:      Head: Normocephalic and atraumatic.      Nose: No congestion or  rhinorrhea.   Eyes:      General: No scleral icterus.        Right eye: No discharge.         Left eye: No discharge.   Pulmonary:      Comments: On 2L O2, labored breathing  No wheezing, no overt creps heard on bilateral lungs  Abdominal:      General: There is no distension.      Tenderness: There is abdominal tenderness (LUQ).      Comments: Ostomy with opaque stoma bag, unable to visualize stoma.   Patient reports pain around stoma. No flatus in bag, no BM since today.   Last BM was yesterday, thicker than usual.   Abdominal wound incision noted, mod drainage on abd pad, malodorous, greenish    Musculoskeletal:      Right lower leg: No edema.      Left lower leg: No edema.   Skin:     General: Skin is warm and dry.   Neurological:      Mental Status: She is alert and oriented to person, place, and time. Mental status is at baseline.   Psychiatric:         Mood and Affect: Mood normal.         Behavior: Behavior normal.              Significant Labs: All pertinent labs within the past 24 hours have been reviewed.  Blood Culture: No results for input(s): LABBLOO in the last 48 hours.  BMP:   Recent Labs   Lab 05/10/23  1407   *   *   K 4.1   CL 92*   CO2 24   BUN 28*   CREATININE 1.2   CALCIUM 8.9     CBC:   Recent Labs   Lab 05/10/23  1407   WBC 3.71*   HGB 11.2*   HCT 35.9*        CMP:   Recent Labs   Lab 05/10/23  1407   *   K 4.1   CL 92*   CO2 24   *   BUN 28*   CREATININE 1.2   CALCIUM 8.9   PROT 8.4   ALBUMIN 2.3*   BILITOT 1.2*   ALKPHOS 146*   *   *   ANIONGAP 13     Coagulation: No results for input(s): PT, INR, APTT in the last 48 hours.  Lactic Acid: No results for input(s): LACTATE in the last 48 hours.  Magnesium: No results for input(s): MG in the last 48 hours.  POCT Glucose: No results for input(s): POCTGLUCOSE in the last 48 hours.  Troponin: No results for input(s): TROPONINI, TROPONINIHS in the last 48 hours.  Urine Studies: No results for  input(s): COLORU, APPEARANCEUA, PHUR, SPECGRAV, PROTEINUA, GLUCUA, KETONESU, BILIRUBINUA, OCCULTUA, NITRITE, UROBILINOGEN, LEUKOCYTESUR, RBCUA, WBCUA, BACTERIA, SQUAMEPITHEL, HYALINECASTS in the last 48 hours.    Invalid input(s): CELINA    Significant Imaging: I have reviewed and interpreted all pertinent imaging results/findings within the past 24 hours.

## 2023-05-10 NOTE — ASSESSMENT & PLAN NOTE
Patient would benefit from Palliative Care given advanced HF. Currently lives at home alone, multiple recent surgeries with debility, has wound care going to home but ?home health.     - Palliative care consult placed  - PT and OT consult

## 2023-05-10 NOTE — HPI
Odette Hart is a 67 year old female with PMHx of HTN, HLD, systolic and diastolic HF (EF 10%, Grade 2 DD, 12/23/22), CAD, MI, ICD in place, COPD, diverticulitis who presents with complaint of shortness of breath for one day. She also reports nausea and vomiting for two days. Patient reports compliance with all medications but has not taken them for 2 days since N/V. She notes that her Lasix dosage was recently lower to 20 mg since her hospitalization in Feb but she has been taking 40 mg recently if there is increase swelling of bilateral lower extremities. Patient reports decreased urine output and debility due to pain in abdominal wound. Patient denies chest pain, bilateral lower extremity swelling. Patient reports increased pain to RUQ where the stoma site is, and reports she did not have a BM or flatus today. Last BM was yesterday, where stool was thicker in consistency than usual. Patient reports chronic abdominal wound dehiscence following recent surgery and is followed with outpatient wound management. Patient denies any increased pain or increased drainage to abdominal wound. Patient also reports a sacral wound that is currently monitored by outpatient wound team for deterioration. Patient denies fever, chills, cough, sore throat, dysuria.     Afebrile, , /64, Pox 98% on 2L O2 via NC. H/H 11.2/35.9. Na 129. Cr 1.2, BUN 28. BNP 4791. CXR (1 view) with findings of no consolidation, pleural effusion or pneumothorax. Cardiac silhouette is enlarged. US abdomen with no acute process seen. Patient was given 500ml of NaCl 0.9% fluid bolus, IV anti-emetics IV ondansetron 8mg and PO prochlorperazine 10mg, and IV furosemide 60mg x 1. Ekg with T wave inversions on lateral leads. Qtc prolonged at 533. Will check Trop I and EKG again at 1900hrs post diuresis.     Patient is admitted to Obs service with Hospital Medicine for management of acute on chronic systolic and diastolic HF, evaluation of new T  wave inversion, nausea and vomiting, and management of abdominal wound, sacral wound.

## 2023-05-10 NOTE — ASSESSMENT & PLAN NOTE
"Hx of abdominal surgery (see under "abdominal wound dehiscence"), constipation   Nausea and vomiting for 2 days   No BM from ostomy today, no flatus noted in bag, unable to visualize stoma (no standby bag)  BM was thick yesterday.   DDx SBO vs constipation     Plan:  - CT Abdomen Pelvis without contrast stat  - Consult to colorectal surgery  - NPO   - Continue judicious maintenance with IVF 75ml/hr    "

## 2023-05-10 NOTE — ASSESSMENT & PLAN NOTE
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 Epstein) on 5/9/2022. Follows colorectal surgeon Dr Cardozo. Last seen outpatient 2 weeks ago. Patient reports drainage from wound has decreased. Follows outpatient wound care 3x a week, comes to home to change dressing, last changed 2 days ago 5/8/23. Abdominal incision with minimal drainage, greenish, malodorous. No pain or erythema to surrounding areas. Not currently on any antibiotics.     - Continue wound care, consult placed

## 2023-05-10 NOTE — ASSESSMENT & PLAN NOTE
Hypoalbuminemia 2.3 with wound dehiscence complicated with nausea and vomiting     - Consult to RD placed to optimize nutritional status

## 2023-05-10 NOTE — ED TRIAGE NOTES
68 yo female presents to ed with reports of nausea and vomiting x 2 days. Hx of pacemaker and chf. Denies cp. Reports sob. Aaox4.

## 2023-05-10 NOTE — PROGRESS NOTES
Pharmacist Renal Dose Adjustment Note    Odette Hart is a 67 y.o. female being treated with the medication enoxaparin    Patient Data:    Vital Signs (Most Recent):  Temp: 98.6 °F (37 °C) (05/10/23 1335)  Pulse: 101 (05/10/23 1335)  BP: 106/64 (05/10/23 1335)  SpO2: 98 % (05/10/23 1335) Vital Signs (72h Range):  Temp:  [98.6 °F (37 °C)]   Pulse:  [101]   BP: (106)/(64)   SpO2:  [98 %]      Recent Labs   Lab 05/10/23  1407   CREATININE 1.2     Serum creatinine: 1.2 mg/dL 05/10/23 1407  Estimated creatinine clearance: 37 mL/min    Medication: enoxaparin dose:  30 mg  frequency daily will be changed to medication: enoxaparin dose: 40 mg frequency: daily    Pharmacist's Name: Pallavi Pearl  Pharmacist's Extension: [phone]

## 2023-05-10 NOTE — ED PROVIDER NOTES
Encounter Date: 5/10/2023    SCRIBE #1 NOTE: IAlly, shay scribing for, and in the presence of,  Analy Stern MD.     History     Chief Complaint   Patient presents with    Shortness of Breath     X1 day and nausea x 2 days. Pt has cardiac hx-- pacemaker and CHF      Time seen by provider: 1:45 PM    This is a 67 y.o. female with PMHx of HTN, HLD, CHF, CAD, MI, who presents with complaint of shortness of breath for one day. She also reports nausea and vomiting for two days. Patient reports compliance with all medications.  She notes that her Lasix dosage was recently lower to 20 mg since her hospitalization but she has been taking 40 mg. This is the extent of the patient's complaints at this time.    The history is provided by the patient and medical records.   Review of patient's allergies indicates:   Allergen Reactions    Augmentin [amoxicillin-pot clavulanate] Swelling     Not allergic to amoxicillin just a derivative of augmentin    Lisinopril Other (See Comments)     Fluid around heart    Shellfish containing products Anaphylaxis     Pt.states she is allergic to SEAFOOD since the age of 13    Levaquin [levofloxacin] Other (See Comments)     Very bad joint pain    Clindamycin Palpitations     Chest pain     Past Medical History:   Diagnosis Date    Acute coronary syndrome     Acute exacerbation of chronic obstructive pulmonary disease (COPD) 3/23/2022    Allergy     Arthritis     Cardiomyopathy     CHF (congestive heart failure)     Coronary artery disease     Coronary artery disease of native artery of native heart with stable angina pectoris 4/19/2021 1/24/2022: [facility]: Cath: LAD: Proximal stent patent. LCX: Proximal 80%. LCX: Dominant. Moderate disease. LCX: HEVER 2.75 x 18 mm. Distal dissection. HEVER 2.75 x 22 mm.     Diverticulitis     Diverticulosis     Familial hypercholesterolemia 1/22/2022    Former smoker 1/24/2022    Heart attack     Heart disease     History of myocardial infarction  2022    Hyperlipidemia     Hypertension     Hypertension 2022    ICD (implantable cardioverter-defibrillator) in place     Non-ST elevation myocardial infarction (NSTEMI) 2019     Past Surgical History:   Procedure Laterality Date    APPENDECTOMY N/A 2022    Procedure: APPENDECTOMY;  Surgeon: Rafa Cardozo MD;  [facility];  Service: Colon and Rectal;  Laterality: N/A;    ATRIAL CARDIAC PACEMAKER INSERTION      CECECTOMY N/A 2022    Procedure: EXCISION, CECUM;  Surgeon: Rafa Cardozo MD;  [facility];  Service: Colon and Rectal;  Laterality: N/A;     SECTION      CHOLECYSTECTOMY N/A 2022    Procedure: CHOLECYSTECTOMY;  Surgeon: Doug Epstein MD;  [facility];  Service: General;  Laterality: N/A;    COLONOSCOPY N/A 2022    Procedure: COLONOSCOPY;  Surgeon: Rafa Cardozo MD;  Location: [facility] OR [facility] FLR;  Service: Colon and Rectal;  Laterality: N/A;    COLOSTOMY  2022    Procedure: CREATION, COLOSTOMY;  Surgeon: Rafa Cardozo MD;  [facility];  Service: Colon and Rectal;;    CORONARY STENT PLACEMENT      CYSTOSCOPY W/ URETERAL STENT PLACEMENT Right 2023    Procedure: CYSTOSCOPY, WITH URETERAL STENT INSERTION;  Surgeon: Felice Laureano MD;  Location: [facility] OR [address] [place] [address];  Service: Urology;  Laterality: Right;  6X24 stent    DEBRIDEMENT, ABDOMEN N/A 2023    Procedure: DEBRIDEMENT, ABDOMEN;  Surgeon: Lucio Sanchez DO;  [facility];  Service: Plastics;  Laterality: N/A;  abdominal wall, and fascia.    INSERTION OF URETERAL CATHETER Left 2023    Procedure: INSERTION, CATHETER, URETER, BILATERAL;  Surgeon: Felice Laureano MD;  Location: [facility] OR [address] [place] [address];  Service: Urology;  Laterality: Left;    LEFT HEART CATHETERIZATION Left 2022    Procedure: CATHETERIZATION, HEART, LEFT;  Surgeon: Yohannes Cordova MD;  Location: [facility] CATH LAB;  Service: Cardiology;  Laterality:  Left;    LYSIS OF ADHESIONS N/A 2/6/2023    Procedure: LYSIS, ADHESIONS;  Surgeon: Rafa Cardozo MD;  Location: [facility] OR 2ND FLR;  Service: Colon and Rectal;  Laterality: N/A;    REVISION COLOSTOMY N/A 2/6/2023    Procedure: REVISION, COLOSTOMY;  Surgeon: Rafa Cardozo MD;  Location: [facility] OR 2ND FLR;  Service: Colon and Rectal;  Laterality: N/A;    SUBTOTAL COLECTOMY  2/6/2023    Procedure: COLECTOMY, PARTIAL;  Surgeon: Rafa Cardozo MD;  Location: [facility] OR 2ND FLR;  Service: Colon and Rectal;;     Family History   Problem Relation Age of Onset    Heart disease Mother     Emphysema Father     Heart attack Brother      Social History     Tobacco Use    Smoking status: Former     Packs/day: 0.50     Types: Cigarettes     Start date: 2/4/1976     Quit date: 12/4/2012     Years since quitting: 10.4    Smokeless tobacco: Never   Substance Use Topics    Alcohol use: No    Drug use: No     Review of Systems   Constitutional:  Negative for chills and fever.   Respiratory:  Positive for shortness of breath.    Gastrointestinal:  Positive for nausea and vomiting.   Musculoskeletal:  Negative for back pain and neck pain.   Skin:  Negative for color change and rash.   Neurological:  Negative for dizziness and headaches.     Physical Exam     Initial Vitals [05/10/23 1335]   BP Pulse Resp Temp SpO2   106/64 101 -- 98.6 °F (37 °C) 98 %      MAP       --         Physical Exam    Nursing note and vitals reviewed.  Constitutional: She appears well-developed. She is cooperative.   HENT:   Head: Atraumatic.   Eyes: Conjunctivae and lids are normal.   Neck:   Normal range of motion.  Cardiovascular:    Tachycardia present.         Pulmonary/Chest: No respiratory distress.   Musculoskeletal:         General: No edema. Normal range of motion.      Cervical back: Normal range of motion.     Neurological: She is alert.   Skin: No rash noted.   Psychiatric: She has a normal mood and affect. Her speech is normal and behavior  is normal.       ED Course   Critical Care    Date/Time: 5/10/2023 3:16 PM  Performed by: Analy Stern MD  Authorized by: Analy Stern MD   Total critical care time (exclusive of procedural time) : 0 minutes  Critical care was necessary to treat or prevent imminent or life-threatening deterioration of the following conditions: cardiac failure.  Critical care was time spent personally by me on the following activities: evaluation of patient's response to treatment, examination of patient, obtaining history from patient or surrogate, re-evaluation of patient's condition and review of old charts.      Labs Reviewed   CBC W/ AUTO DIFFERENTIAL - Abnormal; Notable for the following components:       Result Value    WBC 3.71 (*)     Hemoglobin 11.2 (*)     Hematocrit 35.9 (*)     MCV 76 (*)     MCH 23.6 (*)     MCHC 31.2 (*)     RDW 21.9 (*)     Gran # (ANC) 0.5 (*)     Mono # 1.2 (*)     Gran % 12.1 (*)     Lymph % 52.8 (*)     Mono % 33.2 (*)     All other components within normal limits   B-TYPE NATRIURETIC PEPTIDE - Abnormal; Notable for the following components:    BNP 4,791 (*)     All other components within normal limits   COMPREHENSIVE METABOLIC PANEL - Abnormal; Notable for the following components:    Sodium 129 (*)     Chloride 92 (*)     Glucose 115 (*)     BUN 28 (*)     Albumin 2.3 (*)     Total Bilirubin 1.2 (*)     Alkaline Phosphatase 146 (*)      (*)      (*)     eGFR 50 (*)     All other components within normal limits   HEPATITIS PANEL, ACUTE     EKG Readings: (Independently Interpreted)   Normal sinus rhythm, rate of 97, diffuse T wave inversions, no STEMI.     Imaging Results              X-Ray Chest 1 View (Final result)  Result time 05/10/23 14:57:07      Final result by Keanu Watts MD (05/10/23 14:57:07)                   Impression:      As above.      Electronically signed by: Keanu Watts MD  Date:    05/10/2023  Time:    14:57               Narrative:     EXAMINATION:  XR CHEST 1 VIEW    CLINICAL HISTORY:  Shortness of breath    TECHNIQUE:  Single frontal view of the chest was performed.    COMPARISON:  02/20/2023    FINDINGS:  Cardiac defibrillator noted in the left chest.  No consolidation, pleural effusion or pneumothorax.  Cardiac silhouette is enlarged.                                    X-Rays:   Independently Interpreted Readings:   Chest X-Ray: No infiltrates.  No acute abnormalities. Cardiomegaly present. There is a pacemaker present in the left upper chest.   Medications   sodium chloride 0.9% bolus 500 mL 500 mL (has no administration in time range)   furosemide injection 60 mg (has no administration in time range)   ondansetron injection 8 mg (has no administration in time range)   prochlorperazine tablet 10 mg (10 mg Oral Given 5/10/23 1415)     Medical Decision Making:   Initial Assessment:   67-year-old woman with history of CAD, heart failure, last EF 20%- with ICD in place, hypertension, malnutrition, chronic hypoxic respiratory failure and colectomy with colostomy here with complaint of shortness of breath and nausea.  Patient was last seen by Colorectal surgery 8 days ago and reassuring, recently decreased dose of Lasix, though patient taking 40 mg daily.  Here patient endorses nausea, shortness of breath.  Will plan to evaluate for acute fluid overload, explore etiology for possible abdominal pain and reassess  Independently Interpreted Test(s):   I have ordered and independently interpreted X-rays - see prior notes.  Clinical Tests:   Lab Tests: Reviewed and Ordered  Radiological Study: Reviewed and Ordered  ED Management:  Labs notable for elevated BNP from baseline, mild hyponatremia, creatinine elevated from baseline, transaminitis present. Will place in observation for diuresis. No gb per prior imaging.         Scribe Attestation:   Scribe #1: I performed the above scribed service and the documentation accurately describes the services I  performed. I attest to the accuracy of the note.            Physician Attestation for Scribe: I, Leena, reviewed documentation as scribed in my presence, which is both accurate and complete.       Clinical Impression:   Final diagnoses:  [R06.02] SOB (shortness of breath)  [R11.0] Nausea  [I50.43] Acute on chronic combined systolic and diastolic congestive heart failure (Primary)  [R74.01] Transaminitis  [E87.1] Hyponatremia        ED Disposition Condition    Observation Stable                Analy Stern MD  05/10/23 1517       Analy Stern MD  05/10/23 152

## 2023-05-10 NOTE — ASSESSMENT & PLAN NOTE
Hx of systolic and diastolic HF with acute onset SOB, BNP 4971 requiring 2L O2, no O2 use at baseline. No bilateral peripheral extremity swelling. Also with 2 days of nausea and vomiting. Likely hyponatremia from low dietary intake vs fluid overload from acute on chronic systolic and diastolic HF. Working up nausea and vomiting currently. On IV diuresis.     - Continue IV furosemide 60mg BID  - Strict intake and output  - Trend BMP again at 1900hrs

## 2023-05-10 NOTE — ASSESSMENT & PLAN NOTE
Hx of HTN, HLD, systolic and diastolic HF (EF 10%, Grade 2 DD, 12/23/22), CAD, MI, ICD in place    Patient is identified as having Combined Systolic and Diastolic heart failure that is Acute on chronic. CHF is currently uncontrolled due to Dyspnea not returned to baseline after 1 doses of IV diuretic. IV furosemide 60mg given in ED, 200ccs in canister.  Continue Beta Blocker and Furosemide and monitor clinical status closely. Monitor on telemetry. Patient is on CHF pathway.      Recent Labs   Lab 05/10/23  1407   BNP 4,791*     Plan:  - O2 to keep Pox > 96%   - Duonebs Q4 wake for SOB  - IV furosemide 60mg BID  - Strict intake and output, daily weights  - Continue home meds aspirin 81mg daily, metoprolol 25mg daily, plavix 75mg daily, ezetimbe 10mg daily

## 2023-05-10 NOTE — ASSESSMENT & PLAN NOTE
Non-blanchable stage 1 decubitus ulcer at sacral area, on mepilex border. Outpatient wound care, last seen Monday 5/8/23. Re-positioned patient to right lateral to relieve pressure.     - Consult to wound care  - Turn 2 hourly

## 2023-05-10 NOTE — ASSESSMENT & PLAN NOTE
Nausea and vomiting requiring IV antiemetics but Qtc prolonged 533    - Hold off all IV anti-emetics  - Scopolamine patch  - Daily EKG

## 2023-05-10 NOTE — H&P
[facility]  [facility] Physical    Patient Name: Odette Hart  MRN: [id number]  Patient Class: OP- Observation  Admission Date: 5/10/2023  Attending Physician: JAIRO Coker MD   Primary Care Provider: Braxton Barragan MD         Patient information was obtained from patient, past medical records and ER records.     Subjective:     Principal Problem:Acute on chronic combined systolic and diastolic congestive heart failure    Chief Complaint:   Chief Complaint   Patient presents with    Shortness of Breath     X1 day and nausea x 2 days. Pt has cardiac hx-- pacemaker and CHF         HPI: Odette Hart is a 67 year old female with PMHx of HTN, HLD, systolic and diastolic HF (EF 10%, Grade 2 DD, 12/23/22), CAD, MI, ICD in place, COPD, diverticulitis who presents with complaint of shortness of breath for one day. She also reports nausea and vomiting for two days. Patient reports compliance with all medications. She notes that her Lasix dosage was recently lower to 20 mg since her hospitalization but she has been taking 40 mg.     Afebrile, , /64, Pox 98% on 2L O2 via NC. H/H 11.2/35.9. Na 129. Cr 1.2, BUN 28. BNP 4791. CXR (1 view) with findings of no consolidation, pleural effusion or pneumothorax. Cardiac silhouette is enlarged. US abdomen with no acute process seen. Patient was given 500ml of NaCl 0.9% fluid bolus, IV anti-emetics IV ondansetron 8mg and PO prochlorperazine 10mg, and IV furosemide 60mg x 1. Ekg with T wave inversions on lateral leads. Qtc prolonged at 533. Will check Trop I and EKG again at 1900hrs post diuresis.     Patient is admitted to Obs service with Hospital Medicine for management of acute on chronic systolic and diastolic HF.         Past Medical History:   Diagnosis Date    Acute coronary syndrome     Acute exacerbation of chronic obstructive pulmonary disease (COPD) 3/23/2022    Allergy     Arthritis     Cardiomyopathy     CHF (congestive  heart failure)     Coronary artery disease     Coronary artery disease of native artery of native heart with stable angina pectoris 2021: [facility]: Cath: LAD: Proximal stent patent. LCX: Proximal 80%. LCX: Dominant. Moderate disease. LCX: HEVER 2.75 x 18 mm. Distal dissection. HEVER 2.75 x 22 mm.     Diverticulitis     Diverticulosis     Familial hypercholesterolemia 2022    Former smoker 2022    Heart attack     Heart disease     History of myocardial infarction 2022    Hyperlipidemia     Hypertension     Hypertension 2022    ICD (implantable cardioverter-defibrillator) in place     Non-ST elevation myocardial infarction (NSTEMI) 2019       Past Surgical History:   Procedure Laterality Date    APPENDECTOMY N/A 2022    Procedure: APPENDECTOMY;  Surgeon: Rafa Cardozo MD;  [facility];  Service: Colon and Rectal;  Laterality: N/A;    ATRIAL CARDIAC PACEMAKER INSERTION      CECECTOMY N/A 2022    Procedure: EXCISION, CECUM;  Surgeon: Rafa Cardozo MD;  [facility];  Service: Colon and Rectal;  Laterality: N/A;     SECTION      CHOLECYSTECTOMY N/A 2022    Procedure: CHOLECYSTECTOMY;  Surgeon: Doug Epstein MD;  [facility];  Service: General;  Laterality: N/A;    COLONOSCOPY N/A 2022    Procedure: COLONOSCOPY;  Surgeon: Rafa Cardozo MD;  [facility];  Service: Colon and Rectal;  Laterality: N/A;    COLOSTOMY  2022    Procedure: CREATION, COLOSTOMY;  Surgeon: Rafa Cardozo MD;  [facility];  Service: Colon and Rectal;;    CORONARY STENT PLACEMENT      CYSTOSCOPY W/ URETERAL STENT PLACEMENT Right 2023    Procedure: CYSTOSCOPY, WITH URETERAL STENT INSERTION;  Surgeon: Felice Laureano MD;  [facility];  Service: Urology;  Laterality: Right;  6X24 stent    DEBRIDEMENT, ABDOMEN N/A 2023    Procedure: DEBRIDEMENT, ABDOMEN;  Surgeon:  Lucio Sanchez, ;  [address];  Service: Plastics;  Laterality: N/A;  abdominal wall, and fascia.    INSERTION OF URETERAL CATHETER Left 2/6/2023    Procedure: INSERTION, CATHETER, URETER, BILATERAL;  Surgeon: Felice Laureano MD;  [facility];  Service: Urology;  Laterality: Left;    LEFT HEART CATHETERIZATION Left 1/24/2022    Procedure: CATHETERIZATION, HEART, LEFT;  Surgeon: Yohannes Cordova MD;  Location: [facility] CATH LAB;  Service: Cardiology;  Laterality: Left;    LYSIS OF ADHESIONS N/A 2/6/2023    Procedure: LYSIS, ADHESIONS;  Surgeon: Rafa Cardozo MD;  [facility];  Service: Colon and Rectal;  Laterality: N/A;    REVISION COLOSTOMY N/A 2/6/2023    Procedure: REVISION, COLOSTOMY;  Surgeon: Rafa Cardozo MD;  [facility];  Service: Colon and Rectal;  Laterality: N/A;    SUBTOTAL COLECTOMY  2/6/2023    Procedure: COLECTOMY, PARTIAL;  Surgeon: Rafa Cardozo MD;  [facility];  Service: Colon and Rectal;;       Review of patient's allergies indicates:   Allergen Reactions    Augmentin [amoxicillin-pot clavulanate] Swelling     Not allergic to amoxicillin just a derivative of augmentin    Lisinopril Other (See Comments)     Fluid around heart    Shellfish containing products Anaphylaxis     Pt.states she is allergic to SEAFOOD since the age of 13    Levaquin [levofloxacin] Other (See Comments)     Very bad joint pain    Clindamycin Palpitations     Chest pain       No current facility-administered medications on file prior to encounter.     Current Outpatient Medications on File Prior to Encounter   Medication Sig    acetaminophen (TYLENOL) 500 MG tablet Take 2 tablets (1,000 mg total) by mouth every 8 (eight) hours as needed for Pain.    albuterol (PROVENTIL/VENTOLIN HFA) 90 mcg/actuation inhaler Inhale 2 puffs into the lungs every 6 (six) hours as needed for Wheezing. Rescue    ALPRAZolam (XANAX) 0.5 MG tablet Take 1  tablet (0.5 mg total) by mouth nightly as needed for Anxiety.    aspirin (ECOTRIN) 81 MG EC tablet Take 1 tablet (81 mg total) by mouth once daily.    clopidogreL (PLAVIX) 75 mg tablet Take 1 tablet (75 mg total) by mouth once daily.    diclofenac sodium (VOLTAREN) 1 % Gel Apply topically 4 (four) times daily.    empagliflozin (JARDIANCE) 10 mg tablet Take 1 tablet (10 mg total) by mouth once daily.    ezetimibe (ZETIA) 10 mg tablet Take 1 tablet (10 mg total) by mouth once daily.    famotidine (PEPCID) 20 MG tablet Take 1 tablet (20 mg total) by mouth once daily.    fluticasone propionate (FLONASE) 50 mcg/actuation nasal spray 1 spray (50 mcg total) by Each Nostril route once daily.    food supplemt, lactose-reduced (ENSURE HIGH PROTEIN) Liqd Take 240 mLs by mouth 2 (two) times a day.    furosemide (LASIX) 20 MG tablet Take 1 tablet (20 mg total) by mouth once daily.    HYDROcodone-acetaminophen (NORCO) 5-325 mg per tablet Take 1 tablet by mouth every 12 (twelve) hours as needed for Pain.    HYDROcodone-acetaminophen (NORCO) 5-325 mg per tablet Take 1 tablet by mouth every 6 (six) hours as needed for Pain.    metoprolol succinate (TOPROL-XL) 25 MG 24 hr tablet Take 1 tablet (25 mg total) by mouth once daily.    nitroGLYCERIN (NITROSTAT) 0.4 MG SL tablet Place 1 tablet (0.4 mg total) under the tongue every 5 (five) minutes as needed for Chest pain.    simethicone (MYLICON) 80 MG chewable tablet Take 1 tablet (80 mg total) by mouth every 8 (eight) hours as needed for Flatulence.    [DISCONTINUED] pantoprazole (PROTONIX) 40 MG tablet Take 1 tablet (40 mg total) by mouth once daily.    [DISCONTINUED] prasugreL (EFFIENT) 10 mg Tab Take 1 tablet (10 mg total) by mouth once daily.     Family History       Problem Relation (Age of Onset)    Emphysema Father    Heart attack Brother    Heart disease Mother          Tobacco Use    Smoking status: Former     Packs/day: 0.50     Types: Cigarettes     Start date: 2/4/1976      Quit date: 12/4/2012     Years since quitting: 10.4    Smokeless tobacco: Never   Substance and Sexual Activity    Alcohol use: No    Drug use: No    Sexual activity: Not on file     Review of Systems   Constitutional:  Positive for appetite change and fatigue. Negative for chills and fever.   HENT:  Negative for congestion and sore throat.    Eyes:  Negative for pain and redness.   Respiratory:  Positive for shortness of breath. Negative for cough and wheezing.    Cardiovascular:  Negative for chest pain and leg swelling.   Gastrointestinal:  Positive for abdominal pain, constipation, nausea and vomiting. Negative for abdominal distention and diarrhea.   Genitourinary:  Positive for decreased urine volume. Negative for difficulty urinating and dysuria.   Musculoskeletal:  Positive for gait problem. Negative for back pain.   Skin:  Positive for wound (abdomen, ostomy, sacral). Negative for rash.   Neurological:  Negative for weakness, light-headedness and numbness.   Psychiatric/Behavioral:  Negative for behavioral problems.    Objective:     Vital Signs (Most Recent):  Temp: 98.6 °F (37 °C) (05/10/23 1335)  Pulse: 97 (05/10/23 1653)  Resp: (!) 26 (05/10/23 1653)  BP: (!) 92/45 (05/10/23 1653)  SpO2: 100 % (05/10/23 1653) Vital Signs (24h Range):  Temp:  [98.6 °F (37 °C)] 98.6 °F (37 °C)  Pulse:  [] 97  Resp:  [24-26] 26  SpO2:  [98 %-100 %] 100 %  BP: ()/(45-69) 92/45     Weight: 50.8 kg (112 lb)  Body mass index is 19.84 kg/m².     Physical Exam  Vitals and nursing note reviewed.   Constitutional:       General: She is not in acute distress.     Appearance: She is not ill-appearing.      Comments: Dry mucous membranes    HENT:      Head: Normocephalic and atraumatic.      Nose: No congestion or rhinorrhea.   Eyes:      General: No scleral icterus.        Right eye: No discharge.         Left eye: No discharge.   Pulmonary:      Comments: On 2L O2, labored breathing  No wheezing, no overt creps heard on  bilateral lungs  Abdominal:      General: There is no distension.      Tenderness: There is abdominal tenderness (LUQ).      Comments: Ostomy with opaque stoma bag, unable to visualize stoma.   Patient reports pain around stoma. No flatus in bag, no BM since today.   Last BM was yesterday, thicker than usual.   Abdominal wound incision noted, mod drainage on abd pad, malodorous, greenish    Musculoskeletal:      Right lower leg: No edema.      Left lower leg: No edema.   Skin:     General: Skin is warm and dry.   Neurological:      Mental Status: She is alert and oriented to person, place, and time. Mental status is at baseline.   Psychiatric:         Mood and Affect: Mood normal.         Behavior: Behavior normal.              Significant Labs: All pertinent labs within the past 24 hours have been reviewed.  Blood Culture: No results for input(s): LABBLOO in the last 48 hours.  BMP:   Recent Labs   Lab 05/10/23  1407   *   *   K 4.1   CL 92*   CO2 24   BUN 28*   CREATININE 1.2   CALCIUM 8.9     CBC:   Recent Labs   Lab 05/10/23  1407   WBC 3.71*   HGB 11.2*   HCT 35.9*        CMP:   Recent Labs   Lab 05/10/23  1407   *   K 4.1   CL 92*   CO2 24   *   BUN 28*   CREATININE 1.2   CALCIUM 8.9   PROT 8.4   ALBUMIN 2.3*   BILITOT 1.2*   ALKPHOS 146*   *   *   ANIONGAP 13     Coagulation: No results for input(s): PT, INR, APTT in the last 48 hours.  Lactic Acid: No results for input(s): LACTATE in the last 48 hours.  Magnesium: No results for input(s): MG in the last 48 hours.  POCT Glucose: No results for input(s): POCTGLUCOSE in the last 48 hours.  Troponin: No results for input(s): TROPONINI, TROPONINIHS in the last 48 hours.  Urine Studies: No results for input(s): COLORU, APPEARANCEUA, PHUR, SPECGRAV, PROTEINUA, GLUCUA, KETONESU, BILIRUBINUA, OCCULTUA, NITRITE, UROBILINOGEN, LEUKOCYTESUR, RBCUA, WBCUA, BACTERIA, SQUAMEPITHEL, HYALINECASTS in the last 48 hours.    Invalid  input(s): CELINA    Significant Imaging: I have reviewed and interpreted all pertinent imaging results/findings within the past 24 hours.    Assessment/Plan:     * Acute on chronic combined systolic and diastolic congestive heart failure  Hx of HTN, HLD, systolic and diastolic HF (EF 10%, Grade 2 DD, 12/23/22), CAD, MI, ICD in place    Patient is identified as having Combined Systolic and Diastolic heart failure that is Acute on chronic. CHF is currently uncontrolled due to Dyspnea not returned to baseline after 1 doses of IV diuretic. IV furosemide 60mg given in ED, 200ccs in canister.  Continue Beta Blocker and Furosemide and monitor clinical status closely. Monitor on telemetry. Patient is on CHF pathway.      Recent Labs   Lab 05/10/23  1407   BNP 4,791*     Plan:  - O2 to keep Pox > 96%   - Duonebs Q4 wake for SOB  - IV furosemide 60mg BID  - Strict intake and output, daily weights  - Continue home meds aspirin 81mg daily, metoprolol 25mg daily, plavix 75mg daily, ezetimbe 10mg daily    TAMMY (acute kidney injury)  Cr 1.2 on arrival with baseline 0.7. 2 days of nausea vomiting, with acute on chronic heart failure. TAMMY likely due to cardiorenal and dehydration. 500ml of fluids given in ED.   Patient with acute kidney injury likely due to IVVD/dehydration and cardiorenal  TAMMY is currently under trending. Labs reviewed- Renal function/electrolytes with Estimated Creatinine Clearance: 37 mL/min (based on SCr of 1.2 mg/dL). according to latest data. Monitor urine output and serial BMP and adjust therapy as needed. Avoid nephrotoxins and renally dose meds for GFR listed above.     Plan:  - Judicious fluid replacement with acute on chronic HF, IVF NaCl 0.9% 75ml/hr  - Trend BMP  - Renally dose meds and avoid nephrotoxins    T wave inversion in EKG  Nausea and vomiting x 2 days with acute SOB x 1 day requiring 2 L O2  New T wave inversion in lateral leads   No Trop I on arrival, no chest pain  Informed On call  "cardiologist Dr Wade     - Appreciate Dr Wade's input  - Trop I and EKG at 1900hrs    Nausea and vomiting  Hx of abdominal surgery (see under "abdominal wound dehiscence"), constipation   Nausea and vomiting for 2 days   No BM from ostomy today, no flatus noted in bag, unable to visualize stoma (no standby bag)  BM was thick yesterday.   DDx SBO vs constipation   CT Abdomen Pelvis without contrast 5/10/23 with no small bowel obstruction    Plan:  - Consult to colorectal surgery  - NPO   - Continue judicious maintenance with IVF 75ml/hr    Prolonged Q-T interval on ECG  Nausea and vomiting requiring IV antiemetics but Qtc prolonged 533    - Hold off all IV anti-emetics  - Scopolamine patch  - Daily EKG    Hyponatremia  Hx of systolic and diastolic HF with acute onset SOB, BNP 4971 requiring 2L O2, no O2 use at baseline. No bilateral peripheral extremity swelling. Also with 2 days of nausea and vomiting. Likely hyponatremia from low dietary intake vs fluid overload from acute on chronic systolic and diastolic HF. Working up nausea and vomiting currently. On IV diuresis.     - Continue IV furosemide 60mg BID  - Strict intake and output  - Trend BMP again at 1900hrs    Atrial fibrillation  New onset Afib with hypotension in ED after CT Scan, ED nurse informed cardiologist Dr Wade  Started on IV Dopamine, transferred to ICU     - IV Dopamine gtt  - Keep MAP >65      Abdominal wound dehiscence  Hx of recent acute cholecystitis who underwent an open takedown of colovesical and colovaginal fistuli (two separate areas) with sigmoid colectomy, partial cecectomy and appendectomy, drainage of intra-abdominal abscess and end colostomy (with cholecystectomy by Dr. Epstein) on 5/9/2022. Follows colorectal surgeon Dr Cardozo. Last seen outpatient 2 weeks ago. Patient reports drainage from wound has decreased. Follows outpatient wound care 3x a week, comes to home to change dressing, last changed 2 days ago 5/8/23. Abdominal " "incision with minimal drainage, greenish, malodorous. No pain or erythema to surrounding areas. Not currently on any antibiotics.     - Continue wound care, consult placed     Decubitus ulcer of sacral region, stage 1  Non-blanchable stage 1 decubitus ulcer at sacral area, on mepilex border. Outpatient wound care, last seen Monday 5/8/23. Re-positioned patient to right lateral to relieve pressure.     - Consult to wound care  - Turn 2 hourly    Encounter for palliative care  Patient would benefit from Palliative Care given advanced HF. Currently lives at home alone, multiple recent surgeries with debility, has wound care going to home but ?home health.     - Palliative care consult placed  - PT and OT consult       Severe protein-calorie malnutrition  Hypoalbuminemia 2.3 with wound dehiscence complicated with nausea and vomiting     - Consult to RD placed to optimize nutritional status     CAD (coronary artery disease)  See management for "acute on chronic systolic and diastolic HF"      Debility  See management for "encounter for palliative care"        VTE Risk Mitigation (From admission, onward)           Ordered     enoxaparin injection 40 mg  Daily         05/10/23 1623     IP VTE HIGH RISK PATIENT  Once         05/10/23 1615     Place sequential compression device  Until discontinued         05/10/23 1615                    On 05/10/2023, patient should be placed in hospital observation services under my care in collaboration with Dr Coker.      Mk Pina NP  Department of Hospital Medicine  [facility] - Emergency Dept  "

## 2023-05-11 PROBLEM — R57.9 SHOCK: Status: ACTIVE | Noted: 2023-01-01

## 2023-05-11 PROBLEM — E87.20 METABOLIC ACIDOSIS: Status: ACTIVE | Noted: 2023-01-01

## 2023-05-11 PROBLEM — J96.01 ACUTE RESPIRATORY FAILURE WITH HYPOXIA: Status: ACTIVE | Noted: 2023-01-01

## 2023-05-11 PROBLEM — G93.41 ENCEPHALOPATHY, METABOLIC: Status: ACTIVE | Noted: 2023-01-01

## 2023-05-11 NOTE — CONSULTS
RD consulted for fluid and sodium restriction diet education. Unwarranted at this time d/t patient intubated/sedated. Partial Code status in place. RD recommendations provided. RD to continue to follow and monitor status.

## 2023-05-11 NOTE — PROGRESS NOTES
Pt received on RA Sat's 89-90% with complaints of pain. Placed  back on 2 LPM because pt kept removing O2.    Pt elective Intubated with 7.5 ETT secured at 22 at teeth , per [facility]RA Pt placed on ventilator with documented settings, alarms set and functional ambu bag and mask at [facility]. Mouth care preformed Q4. Pt in no apparent distress ,will continue to monitor.      Pt resting with no distress saturations 96-97% on 30% ,will continue to wean as tolerated

## 2023-05-11 NOTE — ED NOTES
On arrival back from CT, I entered the room to connect the pt back to the monitor. Pt's was hypotensive, systolic on 70s and diastolic in 50s. BP was retaken and reflected a BP in the 60s. Secure chatted Dr. Wade and reported the events occurring. I also communicated that at that time the cardiac monitor reflected AFIB. The pt reported the same symptoms she arrived to ED with including nausea and SOB, denied chest pain. No changes in symptoms. Cardiologist instructed me to begin a dopamine drip. Dopamine drip scanned and started, titrated according to protocol. Instructed to follow protocol provided in the order placed by Dr. Wade. Pt was alert and oriented throughout these events without changes in mental state. The pt was engaging with us throughout and while attempting to start an IV she was able to direct us on where we can and cannot put an IV. Pt educated on plan of care.

## 2023-05-11 NOTE — EICU
EICU BRIEF INITIAL EVALUATION:       HISTORY:      67-year-old woman admitted to ICU for acute on chronic combined heart failure .  Has order for dopamine drip to maintain SBP greater than 90 but it is not running.    History of coronary artery disease, NSTEMI, combined CHF, ischemic cardiomyopathy with AICD, right bundle-branch block, hyperlipidemia, anemia, hypertension, leukopenia, smoking, mood disorder, VRE infection, COPD, hypomagnesemia, diverticulitis, colostomy, colocutaneous fistula, hydronephrosis, hematuria statin intolerance    DVT prophylaxis Lovenox, SCDs  GI prophylaxis famotidine    /74, P 104, R 36, O2 sat 100   Resting comfortably on nasal cannula 3 L    Sodium 130, potassium 3.4, bicarb 27, creatinine 1.1, calcium 8.6, magnesium 2.0, phosphorus 3.4, albumin 2.3   BNP 47 91   Troponin mildly elevated 0.062    Chest x-ray with interstitial prominence, cardiomegaly, AICD    EKG sinus tachycardia with PACs-rate 102, nonspecific interventricular block, inverted T-waves inferiorly and laterally.      Echocardiogram done December 2022:   ? The left ventricle is severely enlarged with eccentric hypertrophy and severely decreased systolic function. The estimated ejection fraction is 10%.  ? Normal right ventricular size with mildly reduced right ventricular systolic function.  ? Grade II left ventricular diastolic dysfunction.  ? Biatrial enlargement.  ? The estimated PA systolic pressure is 51 mmHg.  ? Normal central venous pressure (3 mmHg).       CAMERA ASSESSMENT: Yes      TELEMETRY: Reviewed      NOTES: Reviewed     LABS: Reviewed      IMAGING: Personally reviewed.      DISCUSSED with bedside nurse        ASSESSMENT AND PLAN:       Acute on chronic congestive heart failure improved with treatment-continue ICU monitoring, diuresis.  Taper supplemental oxygen as tolerated      I have reviewed the plan of care for the patient and agree with the plan of care unless noted otherwise  above.      AMY Rocha MD  [facility] Attending  [phone]

## 2023-05-11 NOTE — CARE UPDATE
Colon and Rectal Surgery Update    Ms. Hart is well-known to me - called by Dr. Lawson after she was notified of her recent admission. She was recently admitted with shortness of breath and has unfortunately as rapidly developed progressive cardiac, renal, and liver failure with profound acidosis on maximum support. Discussed care with her son Telly and offered my support.     Am available if any questions arise - [phone]      Rafa Cardozo MD, FACS - Staff Surgeon  Department of Colon & Rectal Surgery  [facility]

## 2023-05-11 NOTE — PROGRESS NOTES
Advance Care Planning  Met at bedside with primary team and ICU. Dr. Coker spoke with family regarding code status.  The decision for partial code status was made, and the nursing staff working to deactivate the AICD ([employer]) Support given. Will continue to follow along.

## 2023-05-11 NOTE — CONSULTS
Surgery Consult Note        SUBJECTIVE:      Chief Complaint: shock      History of Present Illness:  Odette Hart is a 67 y.o. female with  CAD, CHF, and history of MI who presented to the ED with shortness of breath.  Overnight, she decompensated and required intubation.  She is now requiring significant pressor support and is multisystem organ failure.       Of note, the patient has a history of sigmoid colectomy and end colostomy for colovesical fistula takedown by Dr. Cardozo 5/2022.  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  She has been followed closely in clinic and was last seen 5/2/2023.             Review of patient's allergies indicates:   Allergen Reactions    Augmentin [amoxicillin-pot clavulanate] Swelling       Not allergic to amoxicillin just a derivative of augmentin    Lisinopril Other (See Comments)       Fluid around heart    Shellfish containing products Anaphylaxis       Pt.states she is allergic to SEAFOOD since the age of 13    Levaquin [levofloxacin] Other (See Comments)       Very bad joint pain    Clindamycin Palpitations       Chest pain              Past Medical History:   Diagnosis Date    Acute coronary syndrome      Acute exacerbation of chronic obstructive pulmonary disease (COPD) 3/23/2022    Allergy      Arthritis      Cardiomyopathy      CHF (congestive heart failure)      Coronary artery disease      Coronary artery disease of native artery of native heart with stable angina pectoris 4/19/2021 1/24/2022: [facility]: Cath: LAD: Proximal stent patent. LCX: Proximal 80%. LCX: Dominant. Moderate disease. LCX: HEVER 2.75 x 18 mm. Distal dissection. HEVER 2.75 x 22 mm.     Diverticulitis      Diverticulosis      Familial hypercholesterolemia 1/22/2022    Former smoker 1/24/2022    Heart attack      Heart disease      History of myocardial infarction 1/24/2022    Hyperlipidemia       Hypertension      Hypertension 2022    ICD (implantable cardioverter-defibrillator) in place      Non-ST elevation myocardial infarction (NSTEMI) 2019            Past Surgical History:   Procedure Laterality Date    APPENDECTOMY N/A 2022     Procedure: APPENDECTOMY;  Surgeon: Rafa Cardozo MD;  [facility];  Service: Colon and Rectal;  Laterality: N/A;    ATRIAL CARDIAC PACEMAKER INSERTION        CECECTOMY N/A 2022     Procedure: EXCISION, CECUM;  Surgeon: Rafa Cardozo MD;  Location: [facility] OR [facility];  Service: Colon and Rectal;  Laterality: N/A;     SECTION        CHOLECYSTECTOMY N/A 2022     Procedure: CHOLECYSTECTOMY;  Surgeon: Doug Epstein MD;  [facility];  Service: General;  Laterality: N/A;    COLONOSCOPY N/A 2022     Procedure: COLONOSCOPY;  Surgeon: Rafa Cardozo MD;  [facility];  Service: Colon and Rectal;  Laterality: N/A;    COLOSTOMY   2022     Procedure: CREATION, COLOSTOMY;  Surgeon: Rafa Cardozo MD;  [facility];  Service: Colon and Rectal;;    CORONARY STENT PLACEMENT        CYSTOSCOPY W/ URETERAL STENT PLACEMENT Right 2023     Procedure: CYSTOSCOPY, WITH URETERAL STENT INSERTION;  Surgeon: Felice Laureano MD;  [facility];  Service: Urology;  Laterality: Right;  6X24 stent    DEBRIDEMENT, ABDOMEN N/A 2023     Procedure: DEBRIDEMENT, ABDOMEN;  Surgeon: Lucio Sanchez DO;  [facility];  Service: Plastics;  Laterality: N/A;  abdominal wall, and fascia.    INSERTION OF URETERAL CATHETER Left 2023     Procedure: INSERTION, CATHETER, URETER, BILATERAL;  Surgeon: Felice Laureano MD;  [facility];  Service: Urology;  Laterality: Left;    LEFT HEART CATHETERIZATION Left 2022     Procedure: CATHETERIZATION, HEART, LEFT;  Surgeon: Yohannes Cordova MD;  Location: [facility] CATH LAB;  Service: Cardiology;  Laterality: Left;  "   LYSIS OF ADHESIONS N/A 2/6/2023     Procedure: LYSIS, ADHESIONS;  Surgeon: Rafa Cardozo MD;  Location: [facility] OR 2ND FLR;  Service: Colon and Rectal;  Laterality: N/A;    REVISION COLOSTOMY N/A 2/6/2023     Procedure: REVISION, COLOSTOMY;  Surgeon: Rafa Cardozo MD;  Location: [facility];  Service: Colon and Rectal;  Laterality: N/A;    SUBTOTAL COLECTOMY   2/6/2023     Procedure: COLECTOMY, PARTIAL;  Surgeon: Rafa Cardozo MD;  Location: [facility];  Service: Colon and Rectal;;            Family History   Problem Relation Age of Onset    Heart disease Mother      Emphysema Father      Heart attack Brother        Social History            Tobacco Use    Smoking status: Former       Packs/day: 0.50       Types: Cigarettes       Start date: 2/4/1976       Quit date: 12/4/2012       Years since quitting: 10.4    Smokeless tobacco: Never   Substance Use Topics    Alcohol use: No    Drug use: No         Review of Systems:  Review of Systems   Reason unable to perform ROS: patient intubated and sedated.      OBJECTIVE:      Vital Signs (Most Recent)  BP (!) 102/53   Pulse 83   Temp 98.3 °F (36.8 °C) (Oral)   Resp (!) 28   Ht 5' 3" (1.6 m)   Wt 54.3 kg (119 lb 11.4 oz)   LMP  (LMP Unknown)   SpO2 (!) 91%   Breastfeeding No   BMI 21.21 kg/m²      Physical Exam:  General: 67 y.o. female, critically ill appearing, intubated and sedated   Neuro: intubated and sedated   HEENT: normocephalic, atraumatic, ETT, OG in place   Respiratory: mechanically ventilated, symmetrical chest rise   Cardiac: regular rate, mottled extremities   Abdomen: soft, midline wound with multiple draining wounds, ostomy in place in LUQ   Extremities: mottled extremities   Skin: no rashes        Labs:         Recent Results (from the past 336 hour(s))   CBC auto differential     Collection Time: 05/11/23  3:03 AM   Result Value Ref Range     WBC 3.31 (L) 3.90 - 12.70 K/uL     Hemoglobin 11.0 (L) 12.0 - 16.0 g/dL     " Hematocrit 35.4 (L) 37.0 - 48.5 %     Platelets 337 150 - 450 K/uL   CBC auto differential     Collection Time: 05/10/23  2:07 PM   Result Value Ref Range     WBC 3.71 (L) 3.90 - 12.70 K/uL     Hemoglobin 11.2 (L) 12.0 - 16.0 g/dL     Hematocrit 35.9 (L) 37.0 - 48.5 %     Platelets 341 150 - 450 K/uL               Recent Results (from the past 336 hour(s))   Basic metabolic panel     Collection Time: 05/11/23  3:03 AM   Result Value Ref Range     Sodium 130 (L) 136 - 145 mmol/L     Potassium 3.7 3.5 - 5.1 mmol/L     Chloride 92 (L) 95 - 110 mmol/L     CO2 23 23 - 29 mmol/L     BUN 28 (H) 8 - 23 mg/dL     Creatinine 1.3 0.5 - 1.4 mg/dL     Calcium 8.7 8.7 - 10.5 mg/dL     Anion Gap 15 8 - 16 mmol/L   Basic metabolic panel     Collection Time: 05/10/23  9:52 PM   Result Value Ref Range     Sodium 131 (L) 136 - 145 mmol/L     Potassium 3.4 (L) 3.5 - 5.1 mmol/L     Chloride 92 (L) 95 - 110 mmol/L     CO2 27 23 - 29 mmol/L     BUN 26 (H) 8 - 23 mg/dL     Creatinine 1.1 0.5 - 1.4 mg/dL     Calcium 8.6 (L) 8.7 - 10.5 mg/dL     Anion Gap 12 8 - 16 mmol/L   Basic Metabolic Panel     Collection Time: 05/02/23  2:39 PM   Result Value Ref Range     Sodium 134 (L) 136 - 145 mmol/L     Potassium 3.4 (L) 3.5 - 5.1 mmol/L     Chloride 96 95 - 110 mmol/L     CO2 32 (H) 23 - 29 mmol/L     BUN 11 8 - 23 mg/dL     Creatinine 0.7 0.5 - 1.4 mg/dL     Calcium 8.7 8.7 - 10.5 mg/dL     Anion Gap 6 (L) 8 - 16 mmol/L      Component Ref Range & Units 12:09  (5/11/23) 10:22  (5/11/23) 09:48  (5/11/23) 1 yr ago  (5/6/22) 1 yr ago  (1/24/22) 1 yr ago  (1/24/22)   POC PH 7.35 - 7.45 7.007 Low Panic   7.195 Low Panic   7.264 Low Panic           POC PCO2 35 - 45 mmHg 23.6 Low Panic   20.7 Low Panic   21.1 Low Panic           POC PO2 80 - 100 mmHg 393 High   304 High   267 High           POC HCO3 24 - 28 mmol/L 5.9 Low   8.0 Low   9.6 Low           POC BE -2 to 2 mmol/L -25  -20  -17          POC SATURATED O2 95 - 100 % 100  100  100          POC  Sodium 136 - 145 mmol/L 121 Low       138        POC Potassium 3.5 - 5.1 mmol/L 6.0 High       3.7        POC TCO2 23 - 27 mmol/L 7 Low   9 Low   10 Low           POC Ionized Calcium 1.06 - 1.42 mmol/L 1.02 Low       1.19        POC Hematocrit 36 - 54 %PCV 37      46        POC HEMOGLOBIN g/dL 13              Rate   28              Sample   ARTERIAL  ARTERIAL  ARTERIAL  ROBERTA  unknown  unknown    Site   Hue/UAC  Hue/UAC  LR             Imaging:   Impression:     Hepatic steatosis.  Hepatomegaly.     Mild right subhepatic fluid.  Mild anasarca.  Mesenteric haziness or edema.     Small persistent fluid and air along the midline abdominal incision.     Enlarged left periaortic lymph node.     Left lower quadrant colostomy.  No small bowel obstruction.     Cardiomegaly.     Thickening of the interlobular septa.  Findings may be related interstitial disease and or pulmonary edema.        Electronically signed by: Beba Ramos  Date:                                            05/10/2023  Time:                                           19:18        ASSESSMENT/PLAN:         67 y.o. female with CHF (EF 10%) and recent colovaginal fistula takedown (2/2022) followed by colocutaneous fistula takedown (2/2023) who presented to the ED with shortness of breath, now in critical condition in the ICU      - Patient's labs and imaging reviewed.  Patient is in shock, suspect cardiac origin.    - Patient's abdominal exam is stable from clinic visit on 5/2 and CT without acute abdominal process. No acute surgical intervention indicated   - Cardiology and pulmonary critical care following for CHF exacerbation   - Patient discussed with Dr. Coker and Dr. Juliane Lawson MD  Staff Surgeon  Colon & Rectal Surgery

## 2023-05-11 NOTE — CONSULTS
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              4/19/2019: Chol 250. HDL 43. . .              On ezetimibe 10 mg Q24.              1/23/2022: Pravastatin 40 mg Q24 was begun.              At home was on pravastatin 40 mg Q24 and ezetimibe 10 mg Q24.              1/25/2022: Chol 148. HDL 25. LDL 98. .              On ezetimibe 10 mg Q24.              8/30/2022: Chol 106. HDL 13. LDL 71. .  On ezetimibe 10 mg Q24.  1/25/2023: Did not want to take statin.               Fair lipid panel.     8. Former Smoker              1974: Began smoking. 0.5 ppd.               2012: Quit.    9. Colostomy   2/6/2023: Underwent abdominal surgery.     10. Primary Care              Dr. Braxton Barragan.          VTE Risk Mitigation (From admission, onward)           Ordered     enoxaparin injection 40 mg  Daily         05/10/23 1623     IP VTE HIGH RISK PATIENT  Once         05/10/23 1615     Place sequential compression device  Until discontinued         05/10/23 1615                    Thank you for your consult.     I will follow-up with patient. Please contact us if you have any additional questions.    Yohannes  MD Tasha  Cardiology   [facility] ([place])

## 2023-05-11 NOTE — PLAN OF CARE
Problem: Adult Inpatient Plan of Care  Goal: Plan of Care Review  Outcome: Ongoing, Progressing  Flowsheets (Taken 5/11/2023 6422)  Plan of Care Reviewed With: patient  Goal: Patient-Specific Goal (Individualized)  Outcome: Ongoing, Progressing  Goal: Absence of Hospital-Acquired Illness or Injury  Outcome: Ongoing, Progressing  Goal: Optimal Comfort and Wellbeing  Outcome: Ongoing, Progressing  Goal: Readiness for Transition of Care  Outcome: Ongoing, Progressing     Problem: Coping Ineffective  Goal: Effective Coping  Outcome: Ongoing, Progressing

## 2023-05-11 NOTE — PT/OT/SLP PROGRESS
Occupational Therapy      Patient Name:  Odette Hart   MRN:  [id number]    Patient not seen today secondary to Therapist assessment due to medical status (intubation this morning) Will follow-up 5/11/23.    5/11/2023

## 2023-05-11 NOTE — HPI
"Per H&P: "Odette Hart is a 67 year old female with PMHx of HTN, HLD, systolic and diastolic HF (EF 10%, Grade 2 DD, 12/23/22), CAD, MI, ICD in place, COPD, diverticulitis who presents with complaint of shortness of breath for one day. She also reports nausea and vomiting for two days. Patient reports compliance with all medications. She notes that her Lasix dosage was recently lower to 20 mg since her hospitalization but she has been taking 40 mg.      Afebrile, , /64, Pox 98% on 2L O2 via NC. H/H 11.2/35.9. Na 129. Cr 1.2, BUN 28. BNP 4791. CXR (1 view) with findings of no consolidation, pleural effusion or pneumothorax. Cardiac silhouette is enlarged. US abdomen with no acute process seen. Patient was given 500ml of NaCl 0.9% fluid bolus, IV anti-emetics IV ondansetron 8mg and PO prochlorperazine 10mg, and IV furosemide 60mg x 1. Ekg with T wave inversions on lateral leads. Qtc prolonged at 533. Will check Trop I and EKG again at 1900hrs post diuresis.      Patient is admitted to Obs service with Hospital Medicine for management of acute on chronic systolic and diastolic HF."    Palliative care consulted for assistance with advanced care planning.   "

## 2023-05-11 NOTE — EICU
Intervention Initiated From:  Bedside    Cyrus intervened regarding:  Time-Out    Nurse Notified:  Yes    Doctor Notified:  Yes    Comments: Called into patients room remotely for a time out at 1006.  Dr. Yuanplaced a L radial art line.  Tolerated well. At 1058 Etomidate 15mg/Rocuronium 54 mg IVP given prior to intubation . At 11:04 Dr Yuan successfully intubated with a 7.5 ETT at a depth of 22cm at the teeth.  Dr. Goldberg in attendance. At 1130 Dr. Yuan placed a R IJ TLC.

## 2023-05-11 NOTE — CONSULTS
Surgery Consult Note      SUBJECTIVE:     Chief Complaint: shock     History of Present Illness:  Odette Hart is a 67 y.o. female with  CAD, CHF, and history of MI who presented to the ED with shortness of breath.  Overnight, she decompensated and required intubation.  She is now requiring significant pressor support and is multisystem organ failure.      Of note, the patient has a history of sigmoid colectomy and end colostomy for colovesical fistula takedown by Dr. Cardozo 5/2022.  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  She has been followed closely in clinic and was last seen 5/2/2023.      Review of patient's allergies indicates:   Allergen Reactions    Augmentin [amoxicillin-pot clavulanate] Swelling     Not allergic to amoxicillin just a derivative of augmentin    Lisinopril Other (See Comments)     Fluid around heart    Shellfish containing products Anaphylaxis     Pt.states she is allergic to SEAFOOD since the age of 13    Levaquin [levofloxacin] Other (See Comments)     Very bad joint pain    Clindamycin Palpitations     Chest pain       Past Medical History:   Diagnosis Date    Acute coronary syndrome     Acute exacerbation of chronic obstructive pulmonary disease (COPD) 3/23/2022    Allergy     Arthritis     Cardiomyopathy     CHF (congestive heart failure)     Coronary artery disease     Coronary artery disease of native artery of native heart with stable angina pectoris 4/19/2021 1/24/2022: [facility]: Cath: LAD: Proximal stent patent. LCX: Proximal 80%. LCX: Dominant. Moderate disease. LCX: HEVER 2.75 x 18 mm. Distal dissection. HEVER 2.75 x 22 mm.     Diverticulitis     Diverticulosis     Familial hypercholesterolemia 1/22/2022    Former smoker 1/24/2022    Heart attack     Heart disease     History of myocardial infarction 1/24/2022    Hyperlipidemia     Hypertension     Hypertension 1/24/2022     ICD (implantable cardioverter-defibrillator) in place     Non-ST elevation myocardial infarction (NSTEMI) 2019     Past Surgical History:   Procedure Laterality Date    APPENDECTOMY N/A 2022    Procedure: APPENDECTOMY;  Surgeon: Rafa Cardozo MD;  [facility];  Service: Colon and Rectal;  Laterality: N/A;    ATRIAL CARDIAC PACEMAKER INSERTION      CECECTOMY N/A 2022    Procedure: EXCISION, CECUM;  Surgeon: Rafa Cardozo MD;  [facility];  Service: Colon and Rectal;  Laterality: N/A;     SECTION      CHOLECYSTECTOMY N/A 2022    Procedure: CHOLECYSTECTOMY;  Surgeon: Doug Epstein MD;  [facility];  Service: General;  Laterality: N/A;    COLONOSCOPY N/A 2022    Procedure: COLONOSCOPY;  Surgeon: Rafa Cardozo MD;  [facility];  Service: Colon and Rectal;  Laterality: N/A;    COLOSTOMY  2022    Procedure: CREATION, COLOSTOMY;  Surgeon: Rafa Cardozo MD;  [facility];  Service: Colon and Rectal;;    CORONARY STENT PLACEMENT      CYSTOSCOPY W/ URETERAL STENT PLACEMENT Right 2023    Procedure: CYSTOSCOPY, WITH URETERAL STENT INSERTION;  Surgeon: Felice Laureano MD;  [facility];  Service: Urology;  Laterality: Right;  6X24 stent    DEBRIDEMENT, ABDOMEN N/A 2023    Procedure: DEBRIDEMENT, ABDOMEN;  Surgeon: Lucio Sanchez DO;  Location: [address];  Service: Plastics;  Laterality: N/A;  abdominal wall, and fascia.    INSERTION OF URETERAL CATHETER Left 2023    Procedure: INSERTION, CATHETER, URETER, BILATERAL;  Surgeon: Felice Laureano MD;  Location: [facility] OR [address] [place] [address];  Service: Urology;  Laterality: Left;    LEFT HEART CATHETERIZATION Left 2022    Procedure: CATHETERIZATION, HEART, LEFT;  Surgeon: Yohannes Cordova MD;  Location: [facility] CATH LAB;  Service: Cardiology;  Laterality: Left;    LYSIS OF ADHESIONS N/A 2023    Procedure: LYSIS, ADHESIONS;   "Surgeon: Rafa Cardozo MD;  [facility];  Service: Colon and Rectal;  Laterality: N/A;    REVISION COLOSTOMY N/A 2/6/2023    Procedure: REVISION, COLOSTOMY;  Surgeon: Rafa Cardozo MD;  Location: [facility] OR 2ND FLR;  Service: Colon and Rectal;  Laterality: N/A;    SUBTOTAL COLECTOMY  2/6/2023    Procedure: COLECTOMY, PARTIAL;  Surgeon: Rafa Cardozo MD;  [facility];  Service: Colon and Rectal;;     Family History   Problem Relation Age of Onset    Heart disease Mother     Emphysema Father     Heart attack Brother      Social History     Tobacco Use    Smoking status: Former     Packs/day: 0.50     Types: Cigarettes     Start date: 2/4/1976     Quit date: 12/4/2012     Years since quitting: 10.4    Smokeless tobacco: Never   Substance Use Topics    Alcohol use: No    Drug use: No        Review of Systems:  Review of Systems   Reason unable to perform ROS: patient intubated and sedated.     OBJECTIVE:     Vital Signs (Most Recent)  BP (!) 102/53   Pulse 83   Temp 98.3 °F (36.8 °C) (Oral)   Resp (!) 28   Ht 5' 3" (1.6 m)   Wt 54.3 kg (119 lb 11.4 oz)   LMP  (LMP Unknown)   SpO2 (!) 91%   Breastfeeding No   BMI 21.21 kg/m²     Physical Exam:  General: 67 y.o. female, critically ill appearing, intubated and sedated   Neuro: intubated and sedated   HEENT: normocephalic, atraumatic, ETT, OG in place   Respiratory: mechanically ventilated, symmetrical chest rise   Cardiac: regular rate, mottled extremities   Abdomen: soft, midline wound with multiple draining wounds, ostomy in place in LUQ   Extremities: mottled extremities   Skin: no rashes      Labs:   Recent Results (from the past 336 hour(s))   CBC auto differential    Collection Time: 05/11/23  3:03 AM   Result Value Ref Range    WBC 3.31 (L) 3.90 - 12.70 K/uL    Hemoglobin 11.0 (L) 12.0 - 16.0 g/dL    Hematocrit 35.4 (L) 37.0 - 48.5 %    Platelets 337 150 - 450 K/uL   CBC auto differential    Collection Time: " 05/10/23  2:07 PM   Result Value Ref Range    WBC 3.71 (L) 3.90 - 12.70 K/uL    Hemoglobin 11.2 (L) 12.0 - 16.0 g/dL    Hematocrit 35.9 (L) 37.0 - 48.5 %    Platelets 341 150 - 450 K/uL        Recent Results (from the past 336 hour(s))   Basic metabolic panel    Collection Time: 05/11/23  3:03 AM   Result Value Ref Range    Sodium 130 (L) 136 - 145 mmol/L    Potassium 3.7 3.5 - 5.1 mmol/L    Chloride 92 (L) 95 - 110 mmol/L    CO2 23 23 - 29 mmol/L    BUN 28 (H) 8 - 23 mg/dL    Creatinine 1.3 0.5 - 1.4 mg/dL    Calcium 8.7 8.7 - 10.5 mg/dL    Anion Gap 15 8 - 16 mmol/L   Basic metabolic panel    Collection Time: 05/10/23  9:52 PM   Result Value Ref Range    Sodium 131 (L) 136 - 145 mmol/L    Potassium 3.4 (L) 3.5 - 5.1 mmol/L    Chloride 92 (L) 95 - 110 mmol/L    CO2 27 23 - 29 mmol/L    BUN 26 (H) 8 - 23 mg/dL    Creatinine 1.1 0.5 - 1.4 mg/dL    Calcium 8.6 (L) 8.7 - 10.5 mg/dL    Anion Gap 12 8 - 16 mmol/L   Basic Metabolic Panel    Collection Time: 05/02/23  2:39 PM   Result Value Ref Range    Sodium 134 (L) 136 - 145 mmol/L    Potassium 3.4 (L) 3.5 - 5.1 mmol/L    Chloride 96 95 - 110 mmol/L    CO2 32 (H) 23 - 29 mmol/L    BUN 11 8 - 23 mg/dL    Creatinine 0.7 0.5 - 1.4 mg/dL    Calcium 8.7 8.7 - 10.5 mg/dL    Anion Gap 6 (L) 8 - 16 mmol/L      Component Ref Range & Units 12:09  (5/11/23) 10:22  (5/11/23) 09:48  (5/11/23) 1 yr ago  (5/6/22) 1 yr ago  (1/24/22) 1 yr ago  (1/24/22)   POC PH 7.35 - 7.45 7.007 Low Panic   7.195 Low Panic   7.264 Low Panic        POC PCO2 35 - 45 mmHg 23.6 Low Panic   20.7 Low Panic   21.1 Low Panic        POC PO2 80 - 100 mmHg 393 High   304 High   267 High        POC HCO3 24 - 28 mmol/L 5.9 Low   8.0 Low   9.6 Low        POC BE -2 to 2 mmol/L -25  -20  -17       POC SATURATED O2 95 - 100 % 100  100  100       POC Sodium 136 - 145 mmol/L 121 Low     138      POC Potassium 3.5 - 5.1 mmol/L 6.0 High     3.7      POC TCO2 23 - 27 mmol/L 7 Low   9 Low   10 Low        POC Ionized Calcium  1.06 - 1.42 mmol/L 1.02 Low     1.19      POC Hematocrit 36 - 54 %PCV 37    46      POC HEMOGLOBIN g/dL 13         Rate  28         Sample  ARTERIAL  ARTERIAL  ARTERIAL  ROBERTA  unknown  unknown    Site  Hue/UAC  Hue/UAC  LR         Imaging:   Impression:     Hepatic steatosis.  Hepatomegaly.     Mild right subhepatic fluid.  Mild anasarca.  Mesenteric haziness or edema.     Small persistent fluid and air along the midline abdominal incision.     Enlarged left periaortic lymph node.     Left lower quadrant colostomy.  No small bowel obstruction.     Cardiomegaly.     Thickening of the interlobular septa.  Findings may be related interstitial disease and or pulmonary edema.        Electronically signed by: Beba Ramos  Date:                                            05/10/2023  Time:                                           19:18      ASSESSMENT/PLAN:       67 y.o. female with CHF (EF 10%) and recent colovaginal fistula takedown (2/2022) followed by colocutaneous fistula takedown (2/2023) who presented to the ED with shortness of breath, now in critical condition in the ICU     - Patient's labs and imaging reviewed.  Patient is in shock, suspect cardiac origin.    - Patient's abdominal exam is stable from clinic visit on 5/2 and CT without acute abdominal process. No acute surgical intervention indicated   - Cardiology and pulmonary critical care following for CHF exacerbation   - Patient discussed with Dr. Coker and Dr. Juliane Lawson MD  Staff Surgeon  Colon & Rectal Surgery

## 2023-05-11 NOTE — PT/OT/SLP PROGRESS
Physical Therapy      Patient Name:  Odette Hart   MRN:  [id number]    Patient not seen today secondary to Nurse/ SIMRAN hold, Other (Comment) (intubation this morning). Will follow-up 5/12/2023.

## 2023-05-11 NOTE — CONSULTS
[facility] ([place])  Adult Nutrition  Consult Note    SUMMARY     Recommendations  1) If TF warranted: Initiate Isosource 1.5 @ 10 mL adv by 10 mL q4-6h until goal rate of 40 mL/hr is met,   Hold for residuals > 400 mL.   Per caution of fluid overload, additional fluids to be determined by MD  Provides 1440 kcal, 65 g P, 169 g C, 733 mL total H20        2) If PPN warranted: peripherally administer Clinimix E 4.25/5 @ 55 mL/hr with standard daily lipids to provide:  - 1320 mL total volume, 56 g protein, 66 g dex, 949 kcal. GIR 0.84.   - Meets 86% protein, 69% kcal.   - Monitor and replete electrolytes.   - Start at half rate and titrate up to goal in 48-72 hr time span.   - Draw triglycerides while on daily lipids        3) If TPN warranted: via central line, administer Clinimix E 5/15 @ 50 mL/hr with standard daily lipids to provide   - 1200 total volume, 60 g protein, 180 g dex, 1352 kcal. GIR 2.30.   - Meets 92% protein, 98% kcal.   - Monitor and replete electrolytes.   - Start at half rate and titrate up to goal in 48-72 hr time span.   - Draw triglycerides while on daily lipids.  Goals: Patient will have a means to nutrition by RD follow up  Nutrition Goal Status: new  Communication of RD Recs:  (POC)    Assessment and Plan    Endocrine  Severe protein-calorie malnutrition  Malnutrition Type:  Context: chronic illness  Level: severe    Related to (etiology):   Physiological state 2/2 CHF, colocutaneous fistula, wound dehiscence    Signs and Symptoms (as evidenced by):   NPO, intubated/sedated, < 50% meal intake, poor appetite reported to previous RD 3/1    Malnutrition Characteristic Summary:  Energy Intake (Malnutrition): less than or equal to 50% for greater than or equal to 1 month  Subcutaneous Fat (Malnutrition): severe depletion  Muscle Mass (Malnutrition): severe depletion  Fluid Accumulation (Malnutrition): other (see comments) (Mild anasarca)      Interventions:  Modify rate of  PPN/TPN  Modify rate of EN  Collaboration with other providers    Nutrition Diagnosis Status:   New         Malnutrition Assessment  Malnutrition Context: chronic illness  Malnutrition Level: severe          Energy Intake (Malnutrition): less than or equal to 50% for greater than or equal to 1 month  Subcutaneous Fat (Malnutrition): severe depletion  Muscle Mass (Malnutrition): severe depletion  Fluid Accumulation (Malnutrition): other (see comments) (Mild anasarca)   Upper Arm Region (Subcutaneous Fat Loss): severe depletion   [place] Region (Muscle Loss): severe depletion                 Reason for Assessment  Reason For Assessment: consult (Intubated)  Diagnosis:  (Acute on chronic combined systolic and diastolic congestive heart failure)  Relevant Medical History:   Patient Active Problem List   Diagnosis    Ischemic cardiomyopathy    Automatic implantable cardioverter-defibrillator in situ    History of coronary artery stent placement    Chronic congestive heart failure    Elevated rheumatoid factor    RBBB with left anterior fascicular block    History of diverticulitis    Coronary artery disease    Hypercholesterolemia    Normocytic anemia    History of myocardial infarction    Hypertension    Former smoker    Leukopenia    At risk for surgical complication    Anemia    Dizziness    Mood disorder    Alteration in skin integrity in adult    T wave inversion in EKG    Hyponatremia    Debility    Recurrent major depressive disorder    Hypotension    VRE (vancomycin-resistant Enterococci) infection    Hypomagnesemia    CAD (coronary artery disease)    Increased nutritional needs    COPD (chronic obstructive pulmonary disease)    Hypoalbuminemia    Severe protein-calorie malnutrition    Abdominal wall cellulitis    Advance care planning    Peripheral edema    History of partial colectomy    Colostomy in place    Asymptomatic bacteriuria    Hypertriglyceridemia    Statin intolerance    Encounter for palliative care  "   Acute on chronic combined systolic and diastolic congestive heart failure    Shortness of breath    Heart failure, systolic and diastolic, chronic    Elevated troponin    Colocutaneous fistula    Pre-operative cardiovascular examination    Ileus    Gross hematuria    Goals of care, counseling/discussion    Hydronephrosis of right kidney    Nausea and vomiting    Decubitus ulcer of sacral region, stage 1    Abdominal wound dehiscence    Prolonged Q-T interval on ECG    TAMMY (acute kidney injury)    Atrial fibrillation    Shock     Interdisciplinary Rounds: did not attend  General Information Comments: Patient in ICU- intubated/sedated at time of assessment. Wound care and RN in room. Per previous RD note 3/1, patient had been endorsing poor appetite, eating <50% of meals. PIV and central line in place. ileostomy. Paul 16- abdomen, vagina, sacral. NFPE pt meets criteria for malnutrition.  Nutrition Discharge Planning: dc needs TBD    Nutrition Risk Screen  Nutrition Risk Screen: reduced oral intake over the last month, large or nonhealing wound, burn or pressure injury, tube feeding or parenteral nutrition    Nutrition/Diet History  Factors Affecting Nutritional Intake: NPO, on mechanical ventilation, decreased appetite    Anthropometrics  Temp: 98.3 °F (36.8 °C)  Height Method: Stated  Height: 5' 3" (160 cm)  Height (inches): 63 in  Weight Method: Bed Scale  Weight: 54.3 kg (119 lb 11.4 oz)  Weight (lb): 119.71 lb  Ideal Body Weight (IBW), Female: 115 lb  % Ideal Body Weight, Female (lb): 104.1 %  BMI (Calculated): 21.2  BMI Grade: 18.5-24.9 - normal  Weight Loss: unintentional  Usual Body Weight (UBW), k.4 kg (2023)  % Usual Body Weight: 96.48  % Weight Change From Usual Weight: -3.72 %       Lab/Procedures/Meds  Pertinent Labs Reviewed: reviewed  CBC:  Recent Labs   Lab 23  0303 23  1209   WBC 3.31*  --    HGB 11.0*  --    HCT 35.4* 37     --      CMP:  Recent Labs   Lab 23  1054 "   CALCIUM 8.3*   ALBUMIN 1.9*   PROT 7.5   *   K 6.1*   CO2 7*   CL 90*   BUN 31*   CREATININE 1.8*   ALKPHOS 161*   *   *   BILITOT 2.1*     Pertinent Medications Reviewed: reviewed  Scheduled Meds:   aspirin  81 mg Oral Daily    chlorhexidine  15 mL Mouth/Throat BID    clopidogreL  75 mg Oral Daily    enoxaparin  40 mg Subcutaneous Daily    etomidate        ezetimibe  10 mg Oral Daily    famotidine (PF)  20 mg Intravenous Daily    fludrocortisone  100 mcg Oral Daily    furosemide (LASIX) injection  40 mg Intravenous BID    hydrocortisone sodium succinate  50 mg Intravenous Q8H    insulin regular  10 Units Intravenous Once    meropenem (MERREM) IVPB  1 g Intravenous Q12H    mupirocin   Nasal BID    propofoL        sodium bicarbonate        vancomycin (VANCOCIN) IVPB  1,250 mg Intravenous Once    [START ON 5/12/2023] vancomycin (VANCOCIN) IVPB  750 mg Intravenous Q24H     Continuous Infusions:   DOBUTamine IV infusion (non-titrating) 5 mcg/kg/min (05/11/23 1445)    NORepinephrine bitartrate-D5W 2.5 mcg/kg/min (05/11/23 1445)    phenylephrine      propofoL 5 mcg/kg/min (05/11/23 1434)    sodium bicarbonate in D5W 100 mL/hr at 05/11/23 1212    vasopressin 0.04 Units/min (05/11/23 1502)     PRN Meds:.albuterol-ipratropium, ALPRAZolam, [COMPLETED] calcium gluconate IVPB **AND** calcium gluconate IVPB, dextrose 10%, dextrose 10%, [COMPLETED] dextrose 10% **AND** dextrose 10% **AND** insulin regular, dextrose, dextrose, glucagon (human recombinant), insulin aspart U-100, senna-docusate 8.6-50 mg, sodium chloride 0.9%, Pharmacy to dose Vancomycin consult **AND** vancomycin - pharmacy to dose    Estimated/Assessed Needs  Weight Used For Calorie Calculations: 54.3 kg (119 lb 11.4 oz)  Energy Calorie Requirements (kcal): 1377  Energy Need Method: [facility]  Protein Requirements: 65-82 (1.2-1.5 g/kg)  Weight Used For Protein Calculations: 54.3 kg (119 lb 11.4 oz)  Fluid Requirements (mL): 1  mL/kcal  Estimated Fluid Requirement Method:  (or per MD)  RDA Method (mL): 1377  CHO Requirement: 172g    Nutrition Prescription Ordered  Current Diet Order: NPO    Evaluation of Received Nutrient/Fluid Intake  I/O: + 80 mL since admit  Energy Calories Required: not meeting needs  Protein Required: not meeting needs  Fluid Required: not meeting needs  Comments: LBM unknown  Tolerance:  (monitoring)  % Intake of Estimated Energy Needs: Other: N/A  % Meal Intake: NPO  Intake/Output - Last 3 Shifts         05/09 0700  05/10 0659 05/10 0700  05/11 0659 05/11 0700 05/12 0659    P.O.  100     I.V. (mL/kg)  678.8 (12.5) 74.1 (1.4)    IV Piggyback  127.5     Total Intake(mL/kg)  906.3 (16.7) 74.1 (1.4)    Urine (mL/kg/hr)  900     Emesis/NG output  0     Stool  0     Total Output  900     Net  +6.3 +74.1           Urine Occurrence  1 x     Stool Occurrence  0 x     Emesis Occurrence  0 x           Nutrition Risk  Level of Risk/Frequency of Follow-up:  (1-2 x/week)       Monitor and Evaluation  Food and Nutrient Intake: energy intake, enteral nutrition intake, parenteral nutrition intake  Food and Nutrient Adminstration: enteral and parenteral nutrition administration  Anthropometric Measurements: weight, weight change  Biochemical Data, Medical Tests and Procedures: electrolyte and renal panel, gastrointestinal profile, glucose/endocrine profile, inflammatory profile, lipid profile  Nutrition-Focused Physical Findings: overall appearance     Nutrition Follow-Up  RD Follow-up?: Yes

## 2023-05-11 NOTE — CONSULTS
"[facility] - Intensive Care ([place])  Palliative Medicine  Consult Note    Patient Name: Odette Hart  MRN: [id number]  Admission Date: 5/10/2023  Hospital Length of Stay: 0 days  Code Status: Full Code   Attending Provider: JAIRO Coker MD  Consulting Provider: Chris Willard MD  Primary Care Physician: Braxton Barragan MD  Principal Problem:Acute on chronic combined systolic and diastolic congestive heart failure    Patient information was obtained from relative(s), past medical records and primary team.      Inpatient consult to Palliative Care  Consult performed by: Chris Willard MD  Consult ordered by: Mk Pina NP  Reason for consult: advanced care planning        Assessment/Plan:     Cardiac/Vascular  * Acute on chronic combined systolic and diastolic congestive heart failure  - noted ef 10% with grade 2 diastolic dysfunction s/p ICD  - management per primary team/cardiology      Shock  - in ICU intubated, sedated and on pressors now due to concern for shock. Possibly cardiogenic  - management per primary team/pccm    Prolonged Q-T interval on ECG  - noted     Automatic implantable cardioverter-defibrillator in situ  - noted in setting of HF ef 10%    Renal/  TAMMY (acute kidney injury)  - management per primary team    Hyponatremia  - management per primary team    Endocrine  Severe protein-calorie malnutrition  - bmi 21  - albumin 2.3    GI  Abdominal wound dehiscence  - significant abdominal surgery history  "open cholecystectomy and takedown of colovesical and colovaginal fistuli (two separate areas) with sigmoid colectomy, partial cecectomy and appendectomy, drainage of intra-abdominal abscess and end colostomy 5/9/2022 recently admitted 12/22/22 - 12/30/22 for new colocutaneous fistula admitted 1/32/23 with incarcerated colonic prolapse from her fistula s/p manual reduction now s/p open transverse/descending colectomy with takedown of colocutaneous fistula and end colostomy + parastomal " "hernia repair + lysis of adhesions + debridement of skin/subcutaneos fat/fascia/muscle (Daniel) 2/7/23, postoperative course complicated by initial ileus"  - follows with colorectal surgery and wound care    Nausea and vomiting  - primary team consulted colorectal surgery given significant abdominal surgery hx    Orthopedic  Decubitus ulcer of sacral region, stage 1  - wound care on board    Palliative Care  Goals of care, counseling/discussion  5/11/2023:  - patient seen at bedside with palliative RN Delfina   - she is not doing well and is imminent for intubation thereby unable to participate in the conversation  - discussed with primary team at bedside and reviewed chart in depth given her significant past medical history  - called and talked with her HCPOA #1 Abelardo (son)   - we updated him regarding her medical on kash  - he was not aware she was in the hospital but was not surprised  - shared with him the severity of her condition and pending intubation shortly  - tried to call and involve his brother Telly as a conference call too but he did not answer.   - Abelardo is in [place] and Telly is local, but Abelardo stated he will update Telly regarding on kash  - Abelardo shared more insight into his mother and her general mind set regarding her health  - stated how difficult it was at times to have her do what is being recommended, with her often wanting to do things her way  - we shared more of what the process entails with intubation and made him aware of her prolonged Qtc and the fear of sudden cardiac arrest being very possible with that condition.   - he stated they had talked about her wishes in the past given the overall severity of her health and she had always made clear her wishes for having everything done including cpr  - encouraged him to discuss further with Telly regarding the parameters within which they feel she would be okay being kept in this state and reassured him that the teams are doing everything they " "possibly can to help improve her condition including pressor support and imminent intubation.   - Abelardo was appreciative of the conversation and update. He is going to discuss further with Telly regarding how long she would be okay staying in this state and think more about what parameters might be unacceptable for her.   - Encourage him to come down and visit her sooner rather than later given the severity of her condition and the possible risks.   - after this conversation returned to bedside and patient had been intuabted as anticipated. Discussed conversation had with Abelardo with primary team and updated them. Primary team had just finished talking with Telly too. Seems Abelardo and Telly will discuss further together.   [] continue with full medical management. Goals are clear as of now for everything to be done including CPR if needed. Abelardo and Telly are to think further regarding discussion as the teams continue to work on improving her condition in the coming days.   [] HCPOA on file and son Abelardo #1 and Telly #2.     Other  Debility  - noted  - pt/ot once able    Discussed with primary team. Chart reviewed extensively. Great note from 2/28/2023 Dr. Gottlieb (palliative) for further insight into past conversations. Goals at that time per her were "restore independence, life prolongation even at expense of quality of life"    Thank you for your consult. I will follow-up with patient. Please contact us if you have any additional questions.    Subjective:     HPI:   Per H&P: "Odette Hart is a 67 year old female with PMHx of HTN, HLD, systolic and diastolic HF (EF 10%, Grade 2 DD, 12/23/22), CAD, MI, ICD in place, COPD, diverticulitis who presents with complaint of shortness of breath for one day. She also reports nausea and vomiting for two days. Patient reports compliance with all medications. She notes that her Lasix dosage was recently lower to 20 mg since her hospitalization but she has been taking 40 mg.      Afebrile, HR " "101, /64, Pox 98% on 2L O2 via NC. H/H 11.2/35.9. Na 129. Cr 1.2, BUN 28. BNP 4791. CXR (1 view) with findings of no consolidation, pleural effusion or pneumothorax. Cardiac silhouette is enlarged. US abdomen with no acute process seen. Patient was given 500ml of NaCl 0.9% fluid bolus, IV anti-emetics IV ondansetron 8mg and PO prochlorperazine 10mg, and IV furosemide 60mg x 1. Ekg with T wave inversions on lateral leads. Qtc prolonged at 533. Will check Trop I and EKG again at 1900hrs post diuresis.      Patient is admitted to Obs service with Hospital Medicine for management of acute on chronic systolic and diastolic HF."    Palliative care consulted for assistance with advanced care planning.       Hospital Course:  No notes on file    Interval History/subjective:  - patient seen at bedside with palliative RN Delfina  - she was not looking well and was being evaluated by the pccm team for intubation  - discussed with primary team and proceeded to call her HCPOA #1 son Abelardo.   - later followed up with primary team at patients bedside and patient was now intubated lying in bed. Updated primary team    Past Medical History:   Diagnosis Date    Acute coronary syndrome     Acute exacerbation of chronic obstructive pulmonary disease (COPD) 3/23/2022    Allergy     Arthritis     Cardiomyopathy     CHF (congestive heart failure)     Coronary artery disease     Coronary artery disease of native artery of native heart with stable angina pectoris 4/19/2021 1/24/2022: [facility]: Cath: LAD: Proximal stent patent. LCX: Proximal 80%. LCX: Dominant. Moderate disease. LCX: HEVER 2.75 x 18 mm. Distal dissection. HEVER 2.75 x 22 mm.     Diverticulitis     Diverticulosis     Familial hypercholesterolemia 1/22/2022    Former smoker 1/24/2022    Heart attack     Heart disease     History of myocardial infarction 1/24/2022    Hyperlipidemia     Hypertension     Hypertension 1/24/2022    ICD (implantable cardioverter-defibrillator) in " place     Non-ST elevation myocardial infarction (NSTEMI) 2019       Past Surgical History:   Procedure Laterality Date    APPENDECTOMY N/A 2022    Procedure: APPENDECTOMY;  Surgeon: Rafa Cardozo MD;  [facility];  Service: Colon and Rectal;  Laterality: N/A;    ATRIAL CARDIAC PACEMAKER INSERTION      CECECTOMY N/A 2022    Procedure: EXCISION, CECUM;  Surgeon: Rafa Cardozo MD;  Location: [facility] OR [facility];  Service: Colon and Rectal;  Laterality: N/A;     SECTION      CHOLECYSTECTOMY N/A 2022    Procedure: CHOLECYSTECTOMY;  Surgeon: Doug Epstein MD;  [facility];  Service: General;  Laterality: N/A;    COLONOSCOPY N/A 2022    Procedure: COLONOSCOPY;  Surgeon: Rafa Cardozo MD;  [facility];  Service: Colon and Rectal;  Laterality: N/A;    COLOSTOMY  2022    Procedure: CREATION, COLOSTOMY;  Surgeon: Rafa Cardozo MD;  [facility];  Service: Colon and Rectal;;    CORONARY STENT PLACEMENT      CYSTOSCOPY W/ URETERAL STENT PLACEMENT Right 2023    Procedure: CYSTOSCOPY, WITH URETERAL STENT INSERTION;  Surgeon: Felice Laureano MD;  Location: [facility] OR [address] [place] [address];  Service: Urology;  Laterality: Right;  6X24 stent    DEBRIDEMENT, ABDOMEN N/A 2023    Procedure: DEBRIDEMENT, ABDOMEN;  Surgeon: Lucio Sanchez DO;  Location: [facility] OR [address] [place] [address];  Service: Plastics;  Laterality: N/A;  abdominal wall, and fascia.    INSERTION OF URETERAL CATHETER Left 2023    Procedure: INSERTION, CATHETER, URETER, BILATERAL;  Surgeon: Felice Laureano MD;  Location: [facility] OR [address] [place] [address];  Service: Urology;  Laterality: Left;    LEFT HEART CATHETERIZATION Left 2022    Procedure: CATHETERIZATION, HEART, LEFT;  Surgeon: Yohannes Cordova MD;  Location: [facility] CATH LAB;  Service: Cardiology;  Laterality: Left;    LYSIS OF ADHESIONS N/A 2023    Procedure: LYSIS, ADHESIONS;  Surgeon: Rafa Cardozo MD;  Location:  [facility] OR 2ND FLR;  Service: Colon and Rectal;  Laterality: N/A;    REVISION COLOSTOMY N/A 2/6/2023    Procedure: REVISION, COLOSTOMY;  Surgeon: Rafa Cardozo MD;  Location: [facility] OR [facility] FLR;  Service: Colon and Rectal;  Laterality: N/A;    SUBTOTAL COLECTOMY  2/6/2023    Procedure: COLECTOMY, PARTIAL;  Surgeon: Rafa Cardozo MD;  [facility];  Service: Colon and Rectal;;       Review of patient's allergies indicates:   Allergen Reactions    Augmentin [amoxicillin-pot clavulanate] Swelling     Not allergic to amoxicillin just a derivative of augmentin    Lisinopril Other (See Comments)     Fluid around heart    Shellfish containing products Anaphylaxis     Pt.states she is allergic to SEAFOOD since the age of 13    Levaquin [levofloxacin] Other (See Comments)     Very bad joint pain    Clindamycin Palpitations     Chest pain       Medications:  Continuous Infusions:   DOBUTamine IV infusion (non-titrating) 5 mcg/kg/min (05/11/23 1036)    NORepinephrine bitartrate-D5W 2 mcg/kg/min (05/11/23 1316)    NORepinephrine bitartrate-D5W 3 mcg/kg/min (05/11/23 1115)    propofoL      sodium bicarbonate in D5W 100 mL/hr at 05/11/23 1212    vasopressin 0.04 Units/min (05/11/23 1010)     Scheduled Meds:   aspirin  81 mg Oral Daily    calcium gluconate IVPB  1 g Intravenous Once    chlorhexidine  15 mL Mouth/Throat BID    clopidogreL  75 mg Oral Daily    dextrose 10%  25 g Intravenous Once    And    insulin regular  10 Units Intravenous Once    enoxaparin  40 mg Subcutaneous Daily    etomidate        etomidate  0.3 mg/kg Intravenous Once    ezetimibe  10 mg Oral Daily    famotidine (PF)  20 mg Intravenous Daily    fludrocortisone  100 mcg Oral Daily    furosemide (LASIX) injection  40 mg Intravenous BID    hydrocortisone sodium succinate  50 mg Intravenous Q8H    meropenem (MERREM) IVPB  1 g Intravenous Q12H    mupirocin   Nasal BID    propofoL        propofoL        rocuronium        rocuronium  1 mg/kg  Intravenous Once    sodium bicarbonate        sodium bicarbonate  50 mEq Intravenous Once    succinylcholine        vancomycin (VANCOCIN) IVPB  1,250 mg Intravenous Once    [START ON 5/12/2023] vancomycin (VANCOCIN) IVPB  750 mg Intravenous Q24H     PRN Meds:albuterol-ipratropium, ALPRAZolam, calcium gluconate IVPB **AND** calcium gluconate IVPB, dextrose 10%, dextrose 10%, dextrose 10% **AND** dextrose 10% **AND** insulin regular, dextrose, dextrose, glucagon (human recombinant), insulin aspart U-100, senna-docusate 8.6-50 mg, sodium chloride 0.9%, Pharmacy to dose Vancomycin consult **AND** vancomycin - pharmacy to dose    Family History       Problem Relation (Age of Onset)    Emphysema Father    Heart attack Brother    Heart disease Mother          Tobacco Use    Smoking status: Former     Packs/day: 0.50     Types: Cigarettes     Start date: 2/4/1976     Quit date: 12/4/2012     Years since quitting: 10.4    Smokeless tobacco: Never   Substance and Sexual Activity    Alcohol use: No    Drug use: No    Sexual activity: Not on file         Objective:     Vital Signs (Most Recent):  Temp: 98.3 °F (36.8 °C) (05/11/23 0400)  Pulse: 83 (05/11/23 1202)  Resp: (!) 28 (05/11/23 1202)  BP: (!) 102/53 (05/11/23 1104)  SpO2: (!) 91 % (05/11/23 0952) Vital Signs (24h Range):  Temp:  [97.3 °F (36.3 °C)-98.4 °F (36.9 °C)] 98.3 °F (36.8 °C)  Pulse:  [] 83  Resp:  [14-58] 28  SpO2:  [78 %-100 %] 91 %  BP: ()/(45-94) 102/53     Weight: 54.3 kg (119 lb 11.4 oz)  Body mass index is 21.21 kg/m².       Physical Exam  Constitutional:       General: She is in acute distress.      Appearance: She is ill-appearing and toxic-appearing.   HENT:      Head: Normocephalic and atraumatic.   Pulmonary:      Effort: Respiratory distress present.      Comments: Appeared to be having difficulty maintaining airway  Neurological:      Comments: Confused, lethargic appearing. Later seen and was intubated and sedated          Review of  Symptoms        Psychosocial/Cultural:   See Palliative Psychosocial Note: Yes  -  as of 2011. Has two sons, Telly lives locally and Abelardo lives in [place].  **Primary  to Follow**  Palliative Care  Consult: No      Advance Care Planning   Advance Directives:   Medical Power of : Yes      Decision Making:  Family answered questions and Patient unable to communicate due to disease severity/cognitive impairment  Goals of Care: The family endorses that what is most important right now is to focus on curative/life-prolongation (regardless of treatment burdens)    Accordingly, we have decided that the best plan to meet the patient's goals includes continuing with treatment         Significant Labs: reviewed  CBC:   Recent Labs   Lab 05/11/23  0303 05/11/23  1209   WBC 3.31*  --    HGB 11.0*  --    HCT 35.4* 37   MCV 75*  --      --      BMP:  Recent Labs   Lab 05/11/23  1054   *   *   K 6.1*   CL 90*   CO2 7*   BUN 31*   CREATININE 1.8*   CALCIUM 8.3*   MG 2.7*     LFT:  Lab Results   Component Value Date     (H) 05/11/2023    ALKPHOS 161 (H) 05/11/2023    BILITOT 2.1 (H) 05/11/2023     Albumin:   Albumin   Date Value Ref Range Status   05/11/2023 1.9 (L) 3.5 - 5.2 g/dL Final     Protein:   Total Protein   Date Value Ref Range Status   05/11/2023 7.5 6.0 - 8.4 g/dL Final     Lactic acid:   Lab Results   Component Value Date    LACTATE SEE COMMENT 05/11/2023    LACTATE 1.8 05/10/2023       Significant Imaging: reviewed        > 50% of 70 min visit spent in chart review, face to face discussion of goals of care,  symptom assessment, coordination of care and emotional support.    16 minutes ACP time spent: goals of care, emotional support, formulating and communication prognosis and goals of care, exploring burden/ benefit of various approaches of treatment.       Chris Willard MD  Palliative Medicine  [facility] - Intensive Care [facility]

## 2023-05-11 NOTE — PLAN OF CARE
Recommendations  1) If TF warranted: Initiate Isosource 1.5 @ 10 mL adv by 10 mL q4-6h until goal rate of 40 mL/hr is met,   Hold for residuals > 400 mL.   Per caution of fluid overload, additional fluids to be determined by MD  Provides 1440 kcal, 65 g P, 169 g C, 733 mL total H20        2) If PPN warranted: peripherally administer Clinimix E 4.25/5 @ 55 mL/hr with standard daily lipids to provide:  - 1320 mL total volume, 56 g protein, 66 g dex, 949 kcal. GIR 0.84.   - Meets 86% protein, 69% kcal.   - Monitor and replete electrolytes.   - Start at half rate and titrate up to goal in 48-72 hr time span.   - Draw triglycerides while on daily lipids        3) If TPN warranted: via central line, administer Clinimix E 5/15 @ 50 mL/hr with standard daily lipids to provide   - 1200 total volume, 60 g protein, 180 g dex, 1352 kcal. GIR 2.30.   - Meets 92% protein, 98% kcal.   - Monitor and replete electrolytes.   - Start at half rate and titrate up to goal in 48-72 hr time span.   - Draw triglycerides while on daily lipids.  Goals: Patient will have a means to nutrition by RD follow up  Nutrition Goal Status: new  Communication of RD Recs:  (POC)

## 2023-05-11 NOTE — NURSING
Nurses Note -- 4 Eyes      5/11/2023   12:29 AM      Skin assessed during: Admit      [] No Altered Skin Integrity Present    [x]Prevention Measures Documented      [x] Yes- Altered Skin Integrity Present or Discovered   [x] LDA Added if Not in Epic (Describe Wound)   [x] New Altered Skin Integrity was Present on Admit and Documented in LDA   [] Wound Image Taken    Wound Care Consulted? Yes    Attending Nurse:  Abida Lindsey RN     Second RN/Staff Member: JUAN Romero

## 2023-05-11 NOTE — PROGRESS NOTES
[facility] Intensive Care [facility] Medicine  Progress Note    Patient Name: Odette Hart  MRN: [id number]  Patient Class: IP- Inpatient   Admission Date: 5/10/2023  Length of Stay: 0 days  Attending Physician: JAIRO Coker MD  Primary Care Provider: Braxton Barragan MD        Subjective:     Principal Problem:Shock        HPI:  Odette Hart is a 67 year old female with PMHx of HTN, HLD, systolic and diastolic HF (EF 10%, Grade 2 DD, 12/23/22), CAD, MI, ICD in place, COPD, diverticulitis who presents with complaint of shortness of breath for one day. She also reports nausea and vomiting for two days. Patient reports compliance with all medications but has not taken them for 2 days since N/V. She notes that her Lasix dosage was recently lower to 20 mg since her hospitalization in Feb but she has been taking 40 mg recently if there is increase swelling of bilateral lower extremities. Patient reports decreased urine output and debility due to pain in abdominal wound. Patient denies chest pain, bilateral lower extremity swelling. Patient reports increased pain to RUQ where the stoma site is, and reports she did not have a BM or flatus today. Last BM was yesterday, where stool was thicker in consistency than usual. Patient reports chronic abdominal wound dehiscence following recent surgery and is followed with outpatient wound management. Patient denies any increased pain or increased drainage to abdominal wound. Patient also reports a sacral wound that is currently monitored by outpatient wound team for deterioration. Patient denies fever, chills, cough, sore throat, dysuria.     Afebrile, , /64, Pox 98% on 2L O2 via NC. H/H 11.2/35.9. Na 129. Cr 1.2, BUN 28. BNP 4791. CXR (1 view) with findings of no consolidation, pleural effusion or pneumothorax. Cardiac silhouette is enlarged. US abdomen with no acute process seen. Patient was given 500ml of NaCl 0.9% fluid bolus, IV anti-emetics IV  ondansetron 8mg and PO prochlorperazine 10mg, and IV furosemide 60mg x 1. Ekg with T wave inversions on lateral leads. Qtc prolonged at 533. Will check Trop I and EKG again at 1900hrs post diuresis.     Patient is admitted to Obs service with Hospital Medicine for management of acute on chronic systolic and diastolic HF, evaluation of new T wave inversion, nausea and vomiting, and management of abdominal wound, sacral wound.         Overview/Hospital Course:  No notes on file    Interval History: Worsening status this morning; initially with N/V, subsequently became hypotensive and increased WOB/acidosis requiring intubation. Multiple phone conversations with son Telly regarding prognosis and illness; discussed with surgery, palliative, pulm/crit, nephrology.    Review of Systems   Unable to perform ROS: Acuity of condition   Objective:     Vital Signs (Most Recent):  Temp: 98.6 °F (37 °C) (05/11/23 1913)  Pulse: 106 (05/11/23 1900)  Resp: (!) 32 (05/11/23 1900)  BP: (!) 44/16 (05/11/23 1900)  SpO2: (!) 93 % (05/11/23 1900) Vital Signs (24h Range):  Temp:  [97.3 °F (36.3 °C)-98.6 °F (37 °C)] 98.6 °F (37 °C)  Pulse:  [] 106  Resp:  [14-58] 32  SpO2:  [29 %-100 %] 93 %  BP: ()/() 44/16  Arterial Line BP: ()/(50-89) 65/50     Weight: 54.3 kg (119 lb 11.4 oz)  Body mass index is 21.21 kg/m².    Intake/Output Summary (Last 24 hours) at 5/11/2023 1953  Last data filed at 5/11/2023 1830  Gross per 24 hour   Intake 4031.23 ml   Output 1000 ml   Net 3031.23 ml         Physical Exam  Vitals and nursing note reviewed.   Constitutional:       Appearance: She is ill-appearing and diaphoretic.   HENT:      Head: Normocephalic and atraumatic.   Eyes:      General:         Right eye: No discharge.         Left eye: No discharge.   Cardiovascular:      Rate and Rhythm: Normal rate.      Pulses: Normal pulses.   Abdominal:      Tenderness: There is abdominal tenderness.      Comments: Midline dressing in place  "with ostomy bag in place.   Musculoskeletal:      Right lower leg: No edema.      Left lower leg: No edema.   Skin:     General: Skin is cool.   Neurological:      Comments: Oriented x 2 (self, "hospital")     Significant Labs:   CBC:  Recent Labs   Lab 05/10/23  1407 05/11/23  0303 05/11/23  1209 05/11/23  1627   WBC 3.71* 3.31*  --  3.43*   HGB 11.2* 11.0*  --  11.3*   HCT 35.9* 35.4* 37 39.7    337  --  178   GRAN 12.1*  0.5* 36.0*  --  39.0   LYMPH 52.8*  2.0 35.0  CANCELED  --  42.0  CANCELED   MONO 33.2*  1.2* 23.0*  CANCELED  --  15.0  CANCELED   EOS 0.0 CANCELED  --  CANCELED   BASO 0.06 CANCELED  --  CANCELED   CMP:  Recent Labs   Lab 05/10/23  1407 05/10/23  2152 05/11/23  0303 05/11/23  1054 05/11/23  1627   * 131* 130* 124* 123*   K 4.1 3.4* 3.7 6.1* 5.2*   CL 92* 92* 92* 90* 86*   CO2 24 27 23 7* 6*   BUN 28* 26* 28* 31* 31*   CREATININE 1.2 1.1 1.3 1.8* 2.0*   * 101 137* 211* 206*   CALCIUM 8.9 8.6* 8.7 8.3* 7.6*   MG 2.0 1.7 1.7 2.7*  --    PHOS 3.4  --   --  9.0*  --    ALKPHOS 146*  --   --  161* 215*   *  --   --  734* 4,934*   *  --   --  368* 1,973*   BILITOT 1.2*  --   --  2.1* 2.7*   PROT 8.4  --   --  7.5 6.7   ALBUMIN 2.3*  --   --  1.9* 1.7*   ANIONGAP 13 12 15 27* 31*     Recent Labs   Lab 05/10/23  2152 05/11/23  1005 05/11/23  1627   LACTATE 1.8 SEE COMMENT >12.0*     Significant Imaging:   Imaging Results              CT Abdomen Pelvis  Without Contrast (Final result)  Result time 05/10/23 19:18:21      Final result by Beba Ramos MD (05/10/23 19:18:21)                   Impression:      Hepatic steatosis.  Hepatomegaly.    Mild right subhepatic fluid.  Mild anasarca.  Mesenteric haziness or edema.    Small persistent fluid and air along the midline abdominal incision.    Enlarged left periaortic lymph node.    Left lower quadrant colostomy.  No small bowel obstruction.    Cardiomegaly.    Thickening of the interlobular septa.  Findings " may be related interstitial disease and or pulmonary edema.      Electronically signed by: Beba Ramos  Date:    05/10/2023  Time:    19:18               Narrative:    EXAMINATION:  CT ABDOMEN PELVIS WITHOUT    CLINICAL HISTORY:  Nausea vomiting bowel obstruction suspected.    TECHNIQUE:  5 mm unenhanced axial images from the lung bases through the greater trochanters were performed.  Coronal and sagittal reformatted images were provided.    COMPARISON:  03/27/2023    FINDINGS:  Within the limits of a noncontrast examination, there is fatty infiltration of the mildly enlarged liver.    Pancreas, kidneys, and adrenal glands are unremarkable.  The gallbladder is surgically absent.    There is remote splenic infarct.    There is small persistent fluid and air along the midline abdominal incision, which is not increased in size.    There is an enlarged left periaortic lymph node measuring 11 x 10 mm.  Mild right subhepatic fluid is present.  There is mesenteric haziness or edema.  Anasarca is noted.  Advanced vascular calcifications are present.    There is no small bowel obstruction.  There is a left lower quadrant colostomy.    There are no pelvic masses or adenopathy.  There is no free pelvic fluid.    At the lung bases, there is thickening of the interlobular septa.  There is honeycombing.  Calcified granulomas are seen..  The heart is enlarged.  There is a right cardiophrenic angle lymph node measuring 9 x 14 mm.                                       US Abdomen Limited (Gallbladder) (Final result)  Result time 05/10/23 16:02:24      Final result by Layla Verdugo MD (05/10/23 16:02:24)                   Impression:      No acute process seen.  Cholecystectomy.      Electronically signed by: Layla Verdugo  Date:    05/10/2023  Time:    16:02               Narrative:    EXAMINATION:  US ABDOMEN LIMITED    CLINICAL HISTORY:  Nausea    TECHNIQUE:  Limited ultrasound of the right upper quadrant of the abdomen  (including pancreas, liver, gallbladder, common bile duct, and spleen) was performed.    COMPARISON:  None.    FINDINGS:  Liver: Normal in size, measuring 15.2 cm. Homogeneous echotexture. No focal hepatic lesions.    Gallbladder: The gallbladder is surgically absent.    Biliary system: The common duct is not dilated, measuring 3 mm.  No intrahepatic ductal dilatation.    Spleen: Normal in size and echotexture, measuring 9.9 cm.    Miscellaneous: No upper abdominal ascites.                                       X-Ray Chest 1 View (Final result)  Result time 05/10/23 14:57:07      Final result by Keanu Watts MD (05/10/23 14:57:07)                   Impression:      As above.      Electronically signed by: Keanu Watts MD  Date:    05/10/2023  Time:    14:57               Narrative:    EXAMINATION:  XR CHEST 1 VIEW    CLINICAL HISTORY:  Shortness of breath    TECHNIQUE:  Single frontal view of the chest was performed.    COMPARISON:  02/20/2023    FINDINGS:  Cardiac defibrillator noted in the left chest.  No consolidation, pleural effusion or pneumothorax.  Cardiac silhouette is enlarged.                                      Assessment/Plan:      * Shock  Multifocal atrial tachycardia, CHF exacerbation, debility, encephalopathy, acidosis, resp failure, multiorgan failure  - Shock likely cardiogenic vs septic; CT Abd/Pelvis without clear evidence of infection. Appreciate surgery assistance. Given worsening start empiric antibiotics with cefepime, vancomycin.  - Ideally continue diuresis; ordered as furosemide 40mg IV BID, strict intake/output, daily weights.  - Mentation worsening and decreasing O2 sats/pressures. Started dobutamine gtt, norepinephrine gtt, vasopressin gtt.  - Intubated this AM for airway protection. Case discussed with cardiology, nephrology, pulm/crit, palliative and patient's son via phone.  - Repeat labs showing worsening organ function. Discussed goals with son in afternoon; in the event of  cardiac arrest would not wish to prolong her suffering in the setting of multiple organ dysfunction. Wishes to continue ventilatory and pressor support which seems reasonable. Partial code status ordered.  - Appreciate consultant assistance. Prognosis is grim with her baseline severe heart disease.    Atrial fibrillation  - As above.    TAMMY (acute kidney injury)  - As above.    Prolonged Q-T interval on ECG  - As above.    Abdominal wound dehiscence  - Wound care.    Decubitus ulcer of sacral region, stage 1  - As above.    Nausea and vomiting  - As above.    Acute on chronic combined systolic and diastolic congestive heart failure  - As above.    Encounter for palliative care  - As above.      Severe protein-calorie malnutrition  - As above.    CAD (coronary artery disease)  - As above.    Debility  - As above.    Hyponatremia  - As above.    T wave inversion in EKG  - As above.    VTE Risk Mitigation (From admission, onward)         Ordered     enoxaparin injection 40 mg  Daily         05/10/23 1623     IP VTE HIGH RISK PATIENT  Once         05/10/23 1615     Place sequential compression device  Until discontinued         05/10/23 1615                Discharge Planning   SIVAN:      Code Status: Partial Code   Is the patient medically ready for discharge?:     Reason for patient still in hospital (select all that apply): Treatment  Discharge Plan A: Home        Critical due to shock.    Critical care time spent on the evaluation and treatment of severe organ dysfunction, review of pertinent labs and imaging studies, discussions with consulting providers and discussions with patient/family: 105 minutes.    YADIRA Coker MD  Department of Hospital Medicine   [facility] Intensive Care [facility]

## 2023-05-11 NOTE — PROGRESS NOTES
Pt received on RA Sat's 89-90% with complaints of pain. Placed  back on 2 LPM because pt kept removing O2.    Pt elective Intubated with 7.5 ETT secured at 22 at teeth , per CNRA Pt placed on ventilator with documented settings, alarms set and functional ambu bag and mask at [facility]. Mouth care preformed Q4. Pt in no apparent distress ,will continue to monitor.      Pt resting with no distress saturations 96-97% on 30% ,will continue to wean as tolerated

## 2023-05-11 NOTE — CONSULTS
Consult Note  Nephrology    Consult Requested By: JAIRO Coker MD  Reason for Consult: TAMMY, Hyperkalemia    SUBJECTIVE:     History of Present Illness:  Patient is a 67 y.o. female with past medical history of combined systolic and diastolic heart failure (EF of 10%), CAD, HTN, COPD and colectomy for colovesical fistula presented to [facility] ER on 5/10/23 with worsening shortness of breath. Patient intubated at time of my exam thus history obtained from chart review. Per report, patient had presented with worsening shortness of breath with associated nausea and vomiting. BNP was 4791 on presentation. In the ER she was given fluids and Lasix and admitted to ICU. She then developed atrial tachycardia and was initially on Dopamine then transitioned to Amiodarone drip. This morning patient had acute decompensation with worsening respiratory failure and was emergently intubated. Now with worsening multiorgan failure and requiring multiple pressors. No family at bedside.    Past Medical History:   Diagnosis Date    Acute coronary syndrome     Acute exacerbation of chronic obstructive pulmonary disease (COPD) 3/23/2022    Allergy     Arthritis     Cardiomyopathy     CHF (congestive heart failure)     Coronary artery disease     Coronary artery disease of native artery of native heart with stable angina pectoris 4/19/2021 1/24/2022: [facility]: Cath: LAD: Proximal stent patent. LCX: Proximal 80%. LCX: Dominant. Moderate disease. LCX: HEVER 2.75 x 18 mm. Distal dissection. HEVER 2.75 x 22 mm.     Diverticulitis     Diverticulosis     Familial hypercholesterolemia 1/22/2022    Former smoker 1/24/2022    Heart attack     Heart disease     History of myocardial infarction 1/24/2022    Hyperlipidemia     Hypertension     Hypertension 1/24/2022    ICD (implantable cardioverter-defibrillator) in place     Non-ST elevation myocardial infarction (NSTEMI) 2/4/2019     Past Surgical History:   Procedure Laterality Date     APPENDECTOMY N/A 2022    Procedure: APPENDECTOMY;  Surgeon: Rafa Cardozo MD;  Location: [facility] OR 2ND FLR;  Service: Colon and Rectal;  Laterality: N/A;    ATRIAL CARDIAC PACEMAKER INSERTION      CECECTOMY N/A 2022    Procedure: EXCISION, CECUM;  Surgeon: Rafa Cardozo MD;  Location: [facility] OR 2ND FLR;  Service: Colon and Rectal;  Laterality: N/A;     SECTION      CHOLECYSTECTOMY N/A 2022    Procedure: CHOLECYSTECTOMY;  Surgeon: Doug Epstein MD;  Location: [facility] OR 2ND FLR;  Service: General;  Laterality: N/A;    COLONOSCOPY N/A 2022    Procedure: COLONOSCOPY;  Surgeon: Rafa Cardozo MD;  Location: [facility] OR [facility] FLR;  Service: Colon and Rectal;  Laterality: N/A;    COLOSTOMY  2022    Procedure: CREATION, COLOSTOMY;  Surgeon: Rafa Cardozo MD;  [facility];  Service: Colon and Rectal;;    CORONARY STENT PLACEMENT      CYSTOSCOPY W/ URETERAL STENT PLACEMENT Right 2023    Procedure: CYSTOSCOPY, WITH URETERAL STENT INSERTION;  Surgeon: Felice Laureano MD;  [facility];  Service: Urology;  Laterality: Right;  6X24 stent    DEBRIDEMENT, ABDOMEN N/A 2023    Procedure: DEBRIDEMENT, ABDOMEN;  Surgeon: Lucio Sanchez DO;  [facility];  Service: Plastics;  Laterality: N/A;  abdominal wall, and fascia.    INSERTION OF URETERAL CATHETER Left 2023    Procedure: INSERTION, CATHETER, URETER, BILATERAL;  Surgeon: Felice Laureano MD;  [facility];  Service: Urology;  Laterality: Left;    LEFT HEART CATHETERIZATION Left 2022    Procedure: CATHETERIZATION, HEART, LEFT;  Surgeon: Yohannes Cordova MD;  Location: [facility] CATH LAB;  Service: Cardiology;  Laterality: Left;    LYSIS OF ADHESIONS N/A 2023    Procedure: LYSIS, ADHESIONS;  Surgeon: Rafa Cardozo MD;  Location: [facility] OR [facility] FLR;  Service: Colon and Rectal;  Laterality: N/A;    REVISION COLOSTOMY N/A 2023    Procedure: REVISION,  COLOSTOMY;  Surgeon: Rafa Cardozo MD;  [facility];  Service: Colon and Rectal;  Laterality: N/A;    SUBTOTAL COLECTOMY  2/6/2023    Procedure: COLECTOMY, PARTIAL;  Surgeon: Rafa Cardozo MD;  [facility];  Service: Colon and Rectal;;     Family History   Problem Relation Age of Onset    Heart disease Mother     Emphysema Father     Heart attack Brother      Social History     Tobacco Use    Smoking status: Former     Packs/day: 0.50     Types: Cigarettes     Start date: 2/4/1976     Quit date: 12/4/2012     Years since quitting: 10.4    Smokeless tobacco: Never   Substance Use Topics    Alcohol use: No    Drug use: No       Review of patient's allergies indicates:   Allergen Reactions    Augmentin [amoxicillin-pot clavulanate] Swelling     Not allergic to amoxicillin just a derivative of augmentin    Lisinopril Other (See Comments)     Fluid around heart    Shellfish containing products Anaphylaxis     Pt.states she is allergic to SEAFOOD since the age of 13    Levaquin [levofloxacin] Other (See Comments)     Very bad joint pain    Clindamycin Palpitations     Chest pain        Review of Systems:  Unable to obtain    OBJECTIVE:     Vital Signs (Most Recent)  Temp: 98.3 °F (36.8 °C) (05/11/23 0400)  Pulse: 98 (05/11/23 1545)  Resp: (!) 32 (05/11/23 1545)  BP: (!) 94/44 (05/11/23 1530)  SpO2: 98 % (05/11/23 1545)    Vital Signs Range (Last 24H):  Temp:  [97.3 °F (36.3 °C)-98.4 °F (36.9 °C)]   Pulse:  []   Resp:  [14-58]   BP: ()/()   SpO2:  [29 %-100 %]   Arterial Line BP: ()/(51-89)       Intake/Output Summary (Last 24 hours) at 5/11/2023 1558  Last data filed at 5/11/2023 1004  Gross per 24 hour   Intake 980.41 ml   Output 900 ml   Net 80.41 ml       Physical Exam:  General appearance: intubated, sedated  Eyes:  Conjunctivae/corneas clear. PERRL.  Lungs: b/l rales  Heart: Regular rate and rhythm, S1, S2 normal, +systolic murmur  Abdomen: Soft,  non-tender non-distended; +colostomy  Extremities: +cyanotic LE's, no edema  Neurologic: unable to assess      Laboratory:  Recent Labs   Lab 05/11/23  0303 05/11/23  1209   WBC 3.31*  --    RBC 4.70  --    HGB 11.0*  --    HCT 35.4* 37     --    MCV 75*  --    MCH 23.4*  --    MCHC 31.1*  --      BMP:   Recent Labs   Lab 05/11/23  1054   *   *   K 6.1*   CL 90*   CO2 7*   BUN 31*   CREATININE 1.8*   CALCIUM 8.3*   MG 2.7*     Lab Results   Component Value Date    CALCIUM 8.3 (L) 05/11/2023    PHOS 9.0 (HH) 05/11/2023     BNP  Recent Labs   Lab 05/10/23  1407   BNP 4,791*     Lab Results   Component Value Date    URICACID 4.9 02/04/2023     Lab Results   Component Value Date    IRON 28 (L) 02/16/2023    TIBC 229 (L) 02/16/2023    FERRITIN 320 (H) 02/16/2023     Lab Results   Component Value Date    CALCIUM 8.3 (L) 05/11/2023    CAION 1.09 03/01/2023    PHOS 9.0 (HH) 05/11/2023       Diagnostic Results:  X-Ray Chest 1 View   Final Result      As above         Electronically signed by: Bret Kamara MD   Date:    05/11/2023   Time:    12:40      CT Abdomen Pelvis  Without Contrast   Final Result      Hepatic steatosis.  Hepatomegaly.      Mild right subhepatic fluid.  Mild anasarca.  Mesenteric haziness or edema.      Small persistent fluid and air along the midline abdominal incision.      Enlarged left periaortic lymph node.      Left lower quadrant colostomy.  No small bowel obstruction.      Cardiomegaly.      Thickening of the interlobular septa.  Findings may be related interstitial disease and or pulmonary edema.         Electronically signed by: Beba Ramos   Date:    05/10/2023   Time:    19:18      US Abdomen Limited (Gallbladder)   Final Result      No acute process seen.  Cholecystectomy.         Electronically signed by: Layla Verdugo   Date:    05/10/2023   Time:    16:02      X-Ray Chest 1 View   Final Result      As above.         Electronically signed by: Keanu Watts MD    Date:    05/10/2023   Time:    14:57          ASSESSMENT/PLAN:     Acute kidney injury  Cardiorenal syndrome  Anion gap metabolic acidosis  -Cr elevated from admission; 1.2 on presentation and was given fluids and Lasix in ER  -This morning elevated to 1.3 then 1.8  -Likely due to worsening cardiogenic shock -- patient now on dobutamine drip  -Bicarb of 7; have added bicarb drip  -She has been shifted for hyperkalemia and repeat labs are pending  -May need CRRT if no improvement with dobutamine and bicarb drip    Hyperkalemia  -Shifted by critical care team  -Repeat labs pending  -Correcting acidosis with bicarb drip    Acute on chronic systolic and diastolic heart failure exacerbation  Cardiogenic shock  -Cardio following closely  -Patient now on dobutamine drip  -Currently on IV Lasix 40 mg BID    Hyponatremia  -124 on repeat labs; was 131 earlier    Hyperphosphatemia  -9. Repeat labs pending    Multifocal atrial tachycardia  -Patient on amiodarone drip initially now held    Shock liver  -Trend transaminases    CAD  -h/o PCI in 2022  -Dr. Cordova following    H/o colectomy with colostomy placement  -Colorectal surgery following; no acute intervention    Patient remains critically ill. May need CRRT. Will repeat labs and re-assess.    Thank you for the consult; please call for any questions/concerns.    Total critical care time (exclusive of procedural time) : 35 minutes  Critical care was necessary to treat or prevent imminent or life-threatening deterioration of the following conditions: cardiogenic shock, acute renal failure.    Critical care was time spent personally by me on the following activities: development of treatment plan with patient or surrogate, discussions with consultants, interpretation of cardiac output measurements, examination of patient, ordering and performing treatments and interventions, evaluation of patient's response to treatment, obtaining history from patient or surrogate,  ordering and review of laboratory studies, ordering and review of radiographic studies, re-evaluation of patient's condition, pulse oximetry and blood draw for specimens

## 2023-05-11 NOTE — CONSULTS
[facility] - Intensive Care [facility])  Wound Care    Patient Name:  Odette Hart   MRN:  [id number]  Date: 5/11/2023  Diagnosis: Acute on chronic combined systolic and diastolic congestive heart failure    History:     Past Medical History:   Diagnosis Date    Acute coronary syndrome     Acute exacerbation of chronic obstructive pulmonary disease (COPD) 3/23/2022    Allergy     Arthritis     Cardiomyopathy     CHF (congestive heart failure)     Coronary artery disease     Coronary artery disease of native artery of native heart with stable angina pectoris 4/19/2021 1/24/2022: [facility]: Cath: LAD: Proximal stent patent. LCX: Proximal 80%. LCX: Dominant. Moderate disease. LCX: HEVER 2.75 x 18 mm. Distal dissection. HEVER 2.75 x 22 mm.     Diverticulitis     Diverticulosis     Familial hypercholesterolemia 1/22/2022    Former smoker 1/24/2022    Heart attack     Heart disease     History of myocardial infarction 1/24/2022    Hyperlipidemia     Hypertension     Hypertension 1/24/2022    ICD (implantable cardioverter-defibrillator) in place     Non-ST elevation myocardial infarction (NSTEMI) 2/4/2019       Social History     Socioeconomic History    Marital status:    Tobacco Use    Smoking status: Former     Packs/day: 0.50     Types: Cigarettes     Start date: 2/4/1976     Quit date: 12/4/2012     Years since quitting: 10.4    Smokeless tobacco: Never   Substance and Sexual Activity    Alcohol use: No    Drug use: No     Social Determinants of Health     Financial Resource Strain: Low Risk     Difficulty of Paying Living Expenses: Not hard at all   Food Insecurity: No Food Insecurity    Worried About Running Out of Food in the Last Year: Never true    Ran Out of Food in the Last Year: Never true   Transportation Needs: No Transportation Needs    Lack of Transportation (Medical): No    Lack of Transportation (Non-Medical): No   Physical Activity: Inactive    Days of Exercise per Week: 0 days    Minutes of Exercise  per Session: 0 min   Stress: No Stress Concern Present    Feeling of Stress : Not at all   Social Connections: Moderately Integrated    Frequency of Communication with Friends and Family: More than three times a week    Frequency of Social Gatherings with Friends and Family: More than three times a week    Attends Baptism Services: 1 to 4 times per year    Active Member of Clubs or Organizations: Yes    Attends Club or Organization Meetings: 1 to 4 times per year    Marital Status:    Housing Stability: Low Risk     Unable to Pay for Housing in the Last Year: No    Number of Places Lived in the Last Year: 1    Unstable Housing in the Last Year: No       Precautions:     Allergies as of 05/10/2023 - Reviewed 05/10/2023   Allergen Reaction Noted    Augmentin [amoxicillin-pot clavulanate] Swelling 02/19/2018    Lisinopril Other (See Comments) 02/19/2018    Shellfish containing products Anaphylaxis 09/10/2018    Levaquin [levofloxacin] Other (See Comments) 09/18/2018    Clindamycin Palpitations 01/21/2019       [facility] Assessment Details/Treatment     Wound care consult received for assessment of abdominal midline wounds, sacrum and colostomy. Patient is a 67 year old female known to wound care team from previous admissions. Past medical history of HTN, HLD, systolic and diastolic HF, CAD, MI, ICD in place, COPD, diverticulitis who presents with complaint of shortness of breath for one day. Patient intubated this morning.     Upon assessment to midline noted 4 openings. The two at the top communicate with each other and the bottom 2 communicate with each other. Irrigated the wounds with Vashe. Noted creamy tan drainage. Lightly packed wounds with Aquacel Ag Hydrofiber and covered with Mepore island border dressing.     Unable to assess sacrum. Nursing reports stage 1 pressure injury. Mepilex sacral foam dressing currently in place. Colostomy pouch lifting upon assessment. Change ostomy pouch to Coloplast drainable  pouch.     Nursing notified. Orders placed for midline and sacrum dressings. Nursing to maintain pressure injury prevention interventions. Support surface in use. Wound care to continue to assist with pt prn.        05/11/23 1417        Incision/Site 02/06/23 1147 Abdomen   Date First Assessed/Time First Assessed: 02/06/23 1147   Location: Abdomen   Wound Image     Incision WDL ex   Dressing Appearance Dry;Intact;Moist drainage   Drainage Amount Small   Drainage Characteristics/Odor Green;Yellow;Malodorous   Appearance Pink;Yellow;Red;Moist   Periwound Area Intact;Pink;Scar tissue   Wound Edges Open   Wound Length (cm) 11 cm   Wound Width (cm) 1 cm   Wound Depth (cm) 1 cm   Wound Volume (cm^3) 11 cm^3   Wound Surface Area (cm^2) 11 cm^2   Care Cleansed with:;Antimicrobial agent   Dressing Applied;Island/border   Packing packed with;hydrofiber/alginate   Dressing Change Due 05/12/23 05/11/2023

## 2023-05-11 NOTE — ASSESSMENT & PLAN NOTE
- noted ef 10% with grade 2 diastolic dysfunction s/p ICD  - management per primary team/cardiology

## 2023-05-11 NOTE — ASSESSMENT & PLAN NOTE
5/11/2023:  - patient seen at bedside with palliative RN Delfina   - she is not doing well and is imminent for intubation thereby unable to participate in the conversation  - discussed with primary team at bedside and reviewed chart in depth given her significant past medical history  - called and talked with her HCPOA #1 Abelardo (son)   - we updated him regarding her medical on kash  - he was not aware she was in the hospital but was not surprised  - shared with him the severity of her condition and pending intubation shortly  - tried to call and involve his brother Telly as a conference call too but he did not answer.   - Abelardo is in [place] and Telly is local, but Abelardo stated he will update Telly regarding on kash  - Abelardo shared more insight into his mother and her general mind set regarding her health  - stated how difficult it was at times to have her do what is being recommended, with her often wanting to do things her way  - we shared more of what the process entails with intubation and made him aware of her prolonged Qtc and the fear of sudden cardiac arrest being very possible with that condition.   - he stated they had talked about her wishes in the past given the overall severity of her health and she had always made clear her wishes for having everything done including cpr  - encouraged him to discuss further with Telly regarding the parameters within which they feel she would be okay being kept in this state and reassured him that the teams are doing everything they possibly can to help improve her condition including pressor support and imminent intubation.   - Abelardo was appreciative of the conversation and update. He is going to discuss further with Telly regarding how long she would be okay staying in this state and think more about what parameters might be unacceptable for her.   - Encourage him to come down and visit her sooner rather than later given the severity of her condition and the possible risks.   -  after this conversation returned to bedside and patient had been intuabted as anticipated. Discussed conversation had with Abelardo with primary team and updated them. Primary team had just finished talking with Telly too. Seems Abelardo and Telly will discuss further together.   [] continue with full medical management. Goals are clear as of now for everything to be done including CPR if needed. Abelardo and Telly are to think further regarding discussion as the teams continue to work on improving her condition in the coming days.   [] HCPOA on file and son Abelardo #1 and Telly #2.

## 2023-05-11 NOTE — PLAN OF CARE
Patient intubated. Assessment completed by phone with Son Abelardo Varner. Family denies having any home DME. PCP correct on facesheet.     Home address updated in system. Family will provide transportation home.   05/11/23 1436   Discharge Assessment   Assessment Type Discharge Planning Assessment   Confirmed/corrected address, phone number and insurance Yes   Confirmed Demographics Correct on Facesheet   Source of Information family   Communicated SIVAN with patient/caregiver Date not available/Unable to determine   People in Home alone   Do you expect to return to your current living situation? Yes   Do you have help at home or someone to help you manage your care at home? No   Prior to hospitilization cognitive status: Alert/Oriented   Current cognitive status: Coma/Sedated/Intubated   Equipment Currently Used at Home none   Readmission within 30 days? No   Do you take prescription medications? No   Do you have prescription coverage? Yes   Do you have any problems affording any of your prescribed medications? Yes   Is the patient taking medications as prescribed? yes   How do you get to doctors appointments? family or friend will provide   Are you on dialysis? No   Do you take coumadin? No   Discharge Plan A Home   DME Needed Upon Discharge  none   Discharge Barriers Identified None     [facility] - Intensive Care (Ct)  Initial Discharge Assessment       Primary Care Provider: Braxton Barragan MD    Admission Diagnosis: Encounter for palliative care [Z51.5]  Debility [R53.81]  Hyponatremia [E87.1]  Nausea [R11.0]  Severe protein-calorie malnutrition [E43]  SOB (shortness of breath) [R06.02]  Prolonged Q-T interval on ECG [R94.31]  Transaminitis [R74.01]  Acute on chronic combined systolic and diastolic congestive heart failure [I50.43]  Decubitus ulcer of sacral region, stage 1 [L89.151]  TAMMY (acute kidney injury) [N17.9]  T wave inversion in EKG [R94.31]  Abdominal wound dehiscence, subsequent encounter  [T81.30XD]  Coronary artery disease involving native coronary artery of native heart without angina pectoris [I25.10]  At risk for prolonged QT interval syndrome [Z91.89]  Nausea and vomiting, unspecified vomiting type [R11.2]    Admission Date: 5/10/2023  Expected Discharge Date:     Discharge Barriers Identified: (P) None    Payor: [employer] / Plan: friendfund CHOICES 65 / Product Type: Medicare Advantage /     Extended Emergency Contact Information  Primary Emergency Contact: Abelardo Ortiz  Mobile Phone: [phone]  Relation: Son  Secondary Emergency Contact: Telly Ortiz Jr   [place]  Mobile Phone: [phone]  Relation: Son    Discharge Plan A: (P) Home         [facility] [address]  [place], [place] - [address]  [address]  [place] [address]  Phone: [phone] Fax: [phone]    [facility] [place]  [address]  [place] [address]  Phone: [phone] Fax: [phone]      Initial Assessment (most recent)       Adult Discharge Assessment - 05/11/23 1436          Discharge Assessment    Assessment Type Discharge Planning Assessment (P)      Confirmed/corrected address, phone number and insurance Yes (P)      Confirmed Demographics Correct on Facesheet (P)      Source of Information family (P)      Communicated SIVAN with patient/caregiver Date not available/Unable to determine (P)      People in Home alone (P)      Do you expect to return to your current living situation? Yes (P)      Do you have help at home or someone to help you manage your care at home? No (P)      Prior to hospitilization cognitive status: Alert/Oriented (P)      Current cognitive status: Coma/Sedated/Intubated (P)      Equipment Currently Used at Home none (P)      Readmission within 30 days? No (P)      Do you take prescription medications? No (P)      Do you have prescription coverage? Yes (P)      Do you have any problems  affording any of your prescribed medications? Yes (P)      Is the patient taking medications as prescribed? yes (P)      How do you get to doctors appointments? family or friend will provide (P)      Are you on dialysis? No (P)      Do you take coumadin? No (P)      Discharge Plan A Home (P)      DME Needed Upon Discharge  none (P)      Discharge Barriers Identified None (P)

## 2023-05-11 NOTE — SUBJECTIVE & OBJECTIVE
Interval History/subjective:  - patient seen at bedside with palliative RN Delfina  - she was not looking well and was being evaluated by the [facility] team for intubation  - discussed with primary team and [facility].   - later followed up with primary team at patients bedside and patient was now intubated lying in bed. Updated primary team    Past Medical History:   Diagnosis Date    Acute coronary syndrome     Acute exacerbation of chronic obstructive pulmonary disease (COPD) 3/23/2022    Allergy     Arthritis     Cardiomyopathy     CHF (congestive heart failure)     Coronary artery disease     Coronary artery disease of native artery of native heart with stable angina pectoris 2021: [facility]: Cath: LAD: Proximal stent patent. LCX: Proximal 80%. LCX: Dominant. Moderate disease. LCX: HEVER 2.75 x 18 mm. Distal dissection. HEVER 2.75 x 22 mm.     Diverticulitis     Diverticulosis     Familial hypercholesterolemia 2022    Former smoker 2022    Heart attack     Heart disease     History of myocardial infarction 2022    Hyperlipidemia     Hypertension     Hypertension 2022    ICD (implantable cardioverter-defibrillator) in place     Non-ST elevation myocardial infarction (NSTEMI) 2019       Past Surgical History:   Procedure Laterality Date    APPENDECTOMY N/A 2022    Procedure: APPENDECTOMY;  Surgeon: Rafa Cardozo MD;  [facility];  Service: Colon and Rectal;  Laterality: N/A;    ATRIAL CARDIAC PACEMAKER INSERTION      CECECTOMY N/A 2022    Procedure: EXCISION, CECUM;  Surgeon: Rafa Cardozo MD;  [facility];  Service: Colon and Rectal;  Laterality: N/A;     SECTION      CHOLECYSTECTOMY N/A 2022    Procedure: CHOLECYSTECTOMY;  Surgeon: Doug Epstein MD;  [facility];  Service: General;  Laterality: N/A;    COLONOSCOPY N/A 2022    Procedure: COLONOSCOPY;  Surgeon: Rafa KO  MD Juliane;  [facility];  Service: Colon and Rectal;  Laterality: N/A;    COLOSTOMY  5/9/2022    Procedure: CREATION, COLOSTOMY;  Surgeon: Rafa Cardozo MD;  [facility];  Service: Colon and Rectal;;    CORONARY STENT PLACEMENT      CYSTOSCOPY W/ URETERAL STENT PLACEMENT Right 2/6/2023    Procedure: CYSTOSCOPY, WITH URETERAL STENT INSERTION;  Surgeon: Felice Laureano MD;  [facility];  Service: Urology;  Laterality: Right;  6X24 stent    DEBRIDEMENT, ABDOMEN N/A 2/6/2023    Procedure: DEBRIDEMENT, ABDOMEN;  Surgeon: Lucio Sanchez DO;  [facility];  Service: Plastics;  Laterality: N/A;  abdominal wall, and fascia.    INSERTION OF URETERAL CATHETER Left 2/6/2023    Procedure: INSERTION, CATHETER, URETER, BILATERAL;  Surgeon: Felice Laureano MD;  Location: [address];  Service: Urology;  Laterality: Left;    LEFT HEART CATHETERIZATION Left 1/24/2022    Procedure: CATHETERIZATION, HEART, LEFT;  Surgeon: Yohannes Cordova MD;  Location: [facility] CATH LAB;  Service: Cardiology;  Laterality: Left;    LYSIS OF ADHESIONS N/A 2/6/2023    Procedure: LYSIS, ADHESIONS;  Surgeon: Rafa Cardozo MD;  [address];  Service: Colon and Rectal;  Laterality: N/A;    REVISION COLOSTOMY N/A 2/6/2023    Procedure: REVISION, COLOSTOMY;  Surgeon: Rafa Cardozo MD;  [facility];  Service: Colon and Rectal;  Laterality: N/A;    SUBTOTAL COLECTOMY  2/6/2023    Procedure: COLECTOMY, PARTIAL;  Surgeon: Rafa Cardozo MD;  [facility];  Service: Colon and Rectal;;       Review of patient's allergies indicates:   Allergen Reactions    Augmentin [amoxicillin-pot clavulanate] Swelling     Not allergic to amoxicillin just a derivative of augmentin    Lisinopril Other (See Comments)     Fluid around heart    Shellfish containing products Anaphylaxis     Pt.states she is allergic to SEAFOOD since the age of 13     Levaquin [levofloxacin] Other (See Comments)     Very bad joint pain    Clindamycin Palpitations     Chest pain       Medications:  Continuous Infusions:   DOBUTamine IV infusion (non-titrating) 5 mcg/kg/min (05/11/23 1036)    NORepinephrine bitartrate-D5W 2 mcg/kg/min (05/11/23 1316)    NORepinephrine bitartrate-D5W 3 mcg/kg/min (05/11/23 1115)    propofoL      sodium bicarbonate in D5W 100 mL/hr at 05/11/23 1212    vasopressin 0.04 Units/min (05/11/23 1010)     Scheduled Meds:   aspirin  81 mg Oral Daily    calcium gluconate IVPB  1 g Intravenous Once    chlorhexidine  15 mL Mouth/Throat BID    clopidogreL  75 mg Oral Daily    dextrose 10%  25 g Intravenous Once    And    insulin regular  10 Units Intravenous Once    enoxaparin  40 mg Subcutaneous Daily    etomidate        etomidate  0.3 mg/kg Intravenous Once    ezetimibe  10 mg Oral Daily    famotidine (PF)  20 mg Intravenous Daily    fludrocortisone  100 mcg Oral Daily    furosemide (LASIX) injection  40 mg Intravenous BID    hydrocortisone sodium succinate  50 mg Intravenous Q8H    meropenem (MERREM) IVPB  1 g Intravenous Q12H    mupirocin   Nasal BID    propofoL        propofoL        rocuronium        rocuronium  1 mg/kg Intravenous Once    sodium bicarbonate        sodium bicarbonate  50 mEq Intravenous Once    succinylcholine        vancomycin (VANCOCIN) IVPB  1,250 mg Intravenous Once    [START ON 5/12/2023] vancomycin (VANCOCIN) IVPB  750 mg Intravenous Q24H     PRN Meds:albuterol-ipratropium, ALPRAZolam, calcium gluconate IVPB **AND** calcium gluconate IVPB, dextrose 10%, dextrose 10%, dextrose 10% **AND** dextrose 10% **AND** insulin regular, dextrose, dextrose, glucagon (human recombinant), insulin aspart U-100, senna-docusate 8.6-50 mg, sodium chloride 0.9%, Pharmacy to dose Vancomycin consult **AND** vancomycin - pharmacy to dose    Family History       Problem Relation (Age of Onset)    Emphysema Father    Heart attack Brother    Heart disease  Mother          Tobacco Use    Smoking status: Former     Packs/day: 0.50     Types: Cigarettes     Start date: 2/4/1976     Quit date: 12/4/2012     Years since quitting: 10.4    Smokeless tobacco: Never   Substance and Sexual Activity    Alcohol use: No    Drug use: No    Sexual activity: Not on file         Objective:     Vital Signs (Most Recent):  Temp: 98.3 °F (36.8 °C) (05/11/23 0400)  Pulse: 83 (05/11/23 1202)  Resp: (!) 28 (05/11/23 1202)  BP: (!) 102/53 (05/11/23 1104)  SpO2: (!) 91 % (05/11/23 0952) Vital Signs (24h Range):  Temp:  [97.3 °F (36.3 °C)-98.4 °F (36.9 °C)] 98.3 °F (36.8 °C)  Pulse:  [] 83  Resp:  [14-58] 28  SpO2:  [78 %-100 %] 91 %  BP: ()/(45-94) 102/53     Weight: 54.3 kg (119 lb 11.4 oz)  Body mass index is 21.21 kg/m².       Physical Exam  Constitutional:       General: She is in acute distress.      Appearance: She is ill-appearing and toxic-appearing.   HENT:      Head: Normocephalic and atraumatic.   Pulmonary:      Effort: Respiratory distress present.      Comments: Appeared to be having difficulty maintaining airway  Neurological:      Comments: Confused, lethargic appearing. Later seen and was intubated and sedated          Review of Symptoms        Psychosocial/Cultural:   See Palliative Psychosocial Note: Yes  -  as of 2011. Has two sons, Telly lives locally and Abelardo lives in [place].  **Primary  to Follow**  Palliative Care  Consult: No      Advance Care Planning   Advance Directives:   Medical Power of : Yes      Decision Making:  Family answered questions and Patient unable to communicate due to disease severity/cognitive impairment  Goals of Care: The family endorses that what is most important right now is to focus on curative/life-prolongation (regardless of treatment burdens)    Accordingly, we have decided that the best plan to meet the patient's goals includes continuing with treatment         Significant Labs:  reviewed  CBC:   Recent Labs   Lab 05/11/23  0303 05/11/23  1209   WBC 3.31*  --    HGB 11.0*  --    HCT 35.4* 37   MCV 75*  --      --      BMP:  Recent Labs   Lab 05/11/23  1054   *   *   K 6.1*   CL 90*   CO2 7*   BUN 31*   CREATININE 1.8*   CALCIUM 8.3*   MG 2.7*     LFT:  Lab Results   Component Value Date     (H) 05/11/2023    ALKPHOS 161 (H) 05/11/2023    BILITOT 2.1 (H) 05/11/2023     Albumin:   Albumin   Date Value Ref Range Status   05/11/2023 1.9 (L) 3.5 - 5.2 g/dL Final     Protein:   Total Protein   Date Value Ref Range Status   05/11/2023 7.5 6.0 - 8.4 g/dL Final     Lactic acid:   Lab Results   Component Value Date    LACTATE SEE COMMENT 05/11/2023    LACTATE 1.8 05/10/2023       Significant Imaging: reviewed

## 2023-05-11 NOTE — ASSESSMENT & PLAN NOTE
Malnutrition Type:  Context: chronic illness  Level: severe    Related to (etiology):   Physiological state 2/2 CHF, colocutaneous fistula, wound dehiscence    Signs and Symptoms (as evidenced by):   NPO, intubated/sedated, < 50% meal intake, poor appetite reported to previous RD 3/1    Malnutrition Characteristic Summary:  Energy Intake (Malnutrition): less than or equal to 50% for greater than or equal to 1 month  Subcutaneous Fat (Malnutrition): severe depletion  Muscle Mass (Malnutrition): severe depletion  Fluid Accumulation (Malnutrition): other (see comments) (Mild anasarca)      Interventions/Recommendations (treatment strategy):  1) If TF warranted: Initiate Isosource 1.5 @ 10 mL adv by 10 mL q4-6h until goal rate of 40 mL/hr is met,   Hold for residuals > 400 mL.   Per caution of fluid overload, additional fluids to be determined by MD  Provides 1440 kcal, 65 g P, 169 g C, 733 mL total H20    2) If PPN warranted: peripherally administer Clinimix E 4.25/5 @ 55 mL/hr with standard daily lipids to provide 1320 mL total volume, 56 g protein, 66 g dex, 949 kcal. GIR 0.84. Meets 86% protein, 69% kcal. Monitor and replete electrolytes. Start at half rate and titrate up to goal in 48-72 hr time span. Draw triglycerides while on daily lipids    3) If TPN warranted: via central line, administer Clinimix E 5/15 @ 50 mL/hr with standard daily lipids to provide 1200 total volume, 60 g protein, 180 g dex, 1352 kcal. GIR 2.30. Meets 92% protein, 98% kcal. Monitor and replete electrolytes. Start at half rate and titrate up to goal in 48-72 hr time span. Draw triglycerides while on daily lipids.    Nutrition Diagnosis Status:   New

## 2023-05-11 NOTE — PROGRESS NOTES
Pharmacokinetic Initial Assessment: IV Vancomycin    Assessment/Plan:    Initiate intravenous vancomycin with loading dose of 1250 mg once followed by a maintenance dose of vancomycin 750mg IV every 24 hours  Desired empiric serum trough concentration is 10 to 20 mcg/mL  Draw vancomycin trough level 60 min prior to third dose on 5/13 at approximately 1000  Pharmacy will continue to follow and monitor vancomycin.      Please contact pharmacy at extension [phone] with any questions regarding this assessment.     Thank you for the consult,   Araceli Sanabria       Patient brief summary:  Odette Hart is a 67 y.o. female initiated on antimicrobial therapy with IV Vancomycin for treatment of suspected sepsis    Drug Allergies:   Review of patient's allergies indicates:   Allergen Reactions    Augmentin [amoxicillin-pot clavulanate] Swelling     Not allergic to amoxicillin just a derivative of augmentin    Lisinopril Other (See Comments)     Fluid around heart    Shellfish containing products Anaphylaxis     Pt.states she is allergic to SEAFOOD since the age of 13    Levaquin [levofloxacin] Other (See Comments)     Very bad joint pain    Clindamycin Palpitations     Chest pain       Actual Body Weight:   54.3 kg    Renal Function:   Estimated Creatinine Clearance: 35.2 mL/min (based on SCr of 1.3 mg/dL).,     Dialysis Method (if applicable):  N/A    CBC (last 72 hours):  Recent Labs   Lab Result Units 05/10/23  1407 05/11/23  0303   WBC K/uL 3.71* 3.31*   Hemoglobin g/dL 11.2* 11.0*   Hematocrit % 35.9* 35.4*   Platelets K/uL 341 337   Gran % % 12.1* 36.0*   Lymph % % 52.8* 35.0   Mono % % 33.2* 23.0*   Eosinophil % % 0.0 0.0   Basophil % % 1.6 0.0   Differential Method  Automated Manual       Metabolic Panel (last 72 hours):  Recent Labs   Lab Result Units 05/10/23  1407 05/10/23  2152 05/11/23  0303   Sodium mmol/L 129* 131* 130*   Potassium mmol/L 4.1 3.4* 3.7   Chloride mmol/L 92* 92* 92*   CO2 mmol/L 24 27  23   Glucose mg/dL 115* 101 137*   BUN mg/dL 28* 26* 28*   Creatinine mg/dL 1.2 1.1 1.3   Albumin g/dL 2.3*  --   --    Total Bilirubin mg/dL 1.2*  --   --    Alkaline Phosphatase U/L 146*  --   --    AST U/L 220*  --   --    ALT U/L 143*  --   --    Magnesium mg/dL 2.0 1.7 1.7   Phosphorus mg/dL 3.4  --   --        Drug levels (last 3 results):  No results for input(s): VANCOMYCINRA, VANCORANDOM, VANCOMYCINPE, VANCOPEAK, VANCOMYCINTR, VANCOTROUGH in the last 72 hours.    Microbiologic Results:  Microbiology Results (last 7 days)       Procedure Component Value Units Date/Time    Blood culture [[id number]]     Order Status: Sent Specimen: Blood     Blood culture [[id number]]     Order Status: Sent Specimen: Blood

## 2023-05-11 NOTE — NURSING
Pt. Admitted to ICU around 2115 last night. Came up from ED tachy and on a dopamine drip at 2.5 mcg/kg/min to maintain SBP greater than 90, but was able to turn off at 2130. Patient maintained stable pressure and heart rate until about 2340 when EKG showed runs of SVT vs Afib vs sinus tach with PAC's-rate was reading up to 140's at times. Pressures began reading low when she was having these runs. Secure chatted with Dr. Wade to see if he wanted to start something for rate control or to turn the dopamine back on. Dr. Wade said to keep PRN dopamine on board with no new orders. Around 0200, patient still having runs of tachycardia and EKG now reading Afib rvr. Patient now symptomatic with SOB and nausea. Placed on higher amount of O2 and gave phenergan. Secure chatted with Dr. Wade again, who ordered 1 ml push of Digoxin. Gave the dig at 0214. Dr. Rocha also contacted by EICU nurse during this time with no new orders. At 0343 secure chatted Dr. Wade again to tell him despite the administration of dig, pt. still in Afib maintaining heart rate up to 170's. Amiodarone ordered at 0345; started loading dose at 0357. Heart rate now in the 90's at 0437. Amio infusing at 1mg/min- patient stable, continuing to monitor.   Richa Lindsey RN

## 2023-05-11 NOTE — ED NOTES
Acknowledge transfer of care of patient. Patient resting in bed with no acute distress noted. Alert and oriented x 3,  and follows commands. Respirations easy and unlabored. IV site clear and patent with drip infusing Able to make needs known, updated on plan of care. Cart in low position, side rails up x 2 and call bell in reach. Will continue to monitor

## 2023-05-11 NOTE — ASSESSMENT & PLAN NOTE
- in ICU intubated, sedated and on pressors now due to concern for shock. Possibly cardiogenic  - management per primary team/pccm

## 2023-05-11 NOTE — ASSESSMENT & PLAN NOTE
"- significant abdominal surgery history  "open cholecystectomy and takedown of colovesical and colovaginal fistuli (two separate areas) with sigmoid colectomy, partial cecectomy and appendectomy, drainage of intra-abdominal abscess and end colostomy 5/9/2022 recently admitted 12/22/22 - 12/30/22 for new colocutaneous fistula admitted 1/32/23 with incarcerated colonic prolapse from her fistula s/p manual reduction now s/p open transverse/descending colectomy with takedown of colocutaneous fistula and end colostomy + parastomal hernia repair + lysis of adhesions + debridement of skin/subcutaneos fat/fascia/muscle (Daniel) 2/7/23, postoperative course complicated by initial ileus"  - follows with colorectal surgery and wound care  "

## 2023-05-12 NOTE — CONSULTS
[facility] - Intensive Care ([place])  Pulmonology  Consult Note    Patient Name: Odette Hart  MRN: [id number]  Admission Date: 5/10/2023  Hospital Length of Stay: 0 days  Code Status: Partial Code  Attending Physician: JAIRO Coker MD  Primary Care Provider: Braxton Barragan MD   Principal Problem: Shock    Inpatient consult to Pulmonary Critical Care  Consult performed by: Marine Yuan MD  Consult ordered by: JAIRO Coekr MD        Subjective:     HPI:  67 year old woman with history of hypertension, heart failure (EF of 10%), CAD, ICD insitu, hyperlipidemia, diverticulitis, colovesical and colovaginal fistuli and acute cholecystitis status post surgeries and chronic abdominal wounds that presents on 05/10 for evaluation of shortness of breath, nausea and vomiting for 2 days. Patient was admitted for acute on chronic heart failure. Hospital course was complicated by hypotension, respiratory failure and AMS requiring admission to ICU.  Patient was started on broad spectrum antibiotics along with vasopressor. Patient was intubated for hypoxic respiratory failure (ABG showed PH . Patient was also found to be hypoglycemic that was treated with IV dextrose with improvement in blood glucose. Labs showed severe metabolic acidosis, TAMMY, shock liver, and leukopenia.          Past Medical History:   Diagnosis Date    Acute coronary syndrome     Acute exacerbation of chronic obstructive pulmonary disease (COPD) 3/23/2022    Allergy     Arthritis     Cardiomyopathy     CHF (congestive heart failure)     Coronary artery disease     Coronary artery disease of native artery of native heart with stable angina pectoris 4/19/2021 1/24/2022: [facility]: Cath: LAD: Proximal stent patent. LCX: Proximal 80%. LCX: Dominant. Moderate disease. LCX: HEVER 2.75 x 18 mm. Distal dissection. HEVER 2.75 x 22 mm.     Diverticulitis     Diverticulosis     Familial hypercholesterolemia 1/22/2022    Former smoker 1/24/2022     Heart attack     Heart disease     History of myocardial infarction 2022    Hyperlipidemia     Hypertension     Hypertension 2022    ICD (implantable cardioverter-defibrillator) in place     Non-ST elevation myocardial infarction (NSTEMI) 2019       Past Surgical History:   Procedure Laterality Date    APPENDECTOMY N/A 2022    Procedure: APPENDECTOMY;  Surgeon: Rafa Cardozo MD;  [facility];  Service: Colon and Rectal;  Laterality: N/A;    ATRIAL CARDIAC PACEMAKER INSERTION      CECECTOMY N/A 2022    Procedure: EXCISION, CECUM;  Surgeon: Rafa Cardozo MD;  [facility];  Service: Colon and Rectal;  Laterality: N/A;     SECTION      CHOLECYSTECTOMY N/A 2022    Procedure: CHOLECYSTECTOMY;  Surgeon: Doug Epstein MD;  Location: [facility] OR [facility] FLR;  Service: General;  Laterality: N/A;    COLONOSCOPY N/A 2022    Procedure: COLONOSCOPY;  Surgeon: Rafa Cardozo MD;  Location: [facility] OR [facility] FLR;  Service: Colon and Rectal;  Laterality: N/A;    COLOSTOMY  2022    Procedure: CREATION, COLOSTOMY;  Surgeon: Rafa Cardozo MD;  [facility];  Service: Colon and Rectal;;    CORONARY STENT PLACEMENT      CYSTOSCOPY W/ URETERAL STENT PLACEMENT Right 2023    Procedure: CYSTOSCOPY, WITH URETERAL STENT INSERTION;  Surgeon: Felice Laureano MD;  [facility];  Service: Urology;  Laterality: Right;  6X24 stent    DEBRIDEMENT, ABDOMEN N/A 2023    Procedure: DEBRIDEMENT, ABDOMEN;  Surgeon: Lucio Sanchez DO;  [facility];  Service: Plastics;  Laterality: N/A;  abdominal wall, and fascia.    INSERTION OF URETERAL CATHETER Left 2023    Procedure: INSERTION, CATHETER, URETER, BILATERAL;  Surgeon: Felice Laureano MD;  [facility];  Service: Urology;  Laterality: Left;    LEFT HEART CATHETERIZATION Left 2022    Procedure: CATHETERIZATION, HEART, LEFT;   Surgeon: Yohannes Cordova MD;  Location: [facility] CATH LAB;  Service: Cardiology;  Laterality: Left;    LYSIS OF ADHESIONS N/A 2/6/2023    Procedure: LYSIS, ADHESIONS;  Surgeon: Rafa Cardozo MD;  Location: [facility] OR 2ND FLR;  Service: Colon and Rectal;  Laterality: N/A;    REVISION COLOSTOMY N/A 2/6/2023    Procedure: REVISION, COLOSTOMY;  Surgeon: Rafa Cardozo MD;  Location: [facility] OR [facility] FLR;  Service: Colon and Rectal;  Laterality: N/A;    SUBTOTAL COLECTOMY  2/6/2023    Procedure: COLECTOMY, PARTIAL;  Surgeon: Rafa Cardozo MD;  Location: [facility] OR [facility];  Service: Colon and Rectal;;       Review of patient's allergies indicates:   Allergen Reactions    Augmentin [amoxicillin-pot clavulanate] Swelling     Not allergic to amoxicillin just a derivative of augmentin    Lisinopril Other (See Comments)     Fluid around heart    Shellfish containing products Anaphylaxis     Pt.states she is allergic to SEAFOOD since the age of 13    Levaquin [levofloxacin] Other (See Comments)     Very bad joint pain    Clindamycin Palpitations     Chest pain       Family History       Problem Relation (Age of Onset)    Emphysema Father    Heart attack Brother    Heart disease Mother          Tobacco Use    Smoking status: Former     Packs/day: 0.50     Types: Cigarettes     Start date: 2/4/1976     Quit date: 12/4/2012     Years since quitting: 10.4    Smokeless tobacco: Never   Substance and Sexual Activity    Alcohol use: No    Drug use: No    Sexual activity: Not on file         Review of Systems   Reason unable to perform ROS: Due to altered mental status.   Objective:     Vital Signs (Most Recent):  Temp: 97.8 °F (36.6 °C) (05/11/23 0900)  Pulse: 102 (05/11/23 1845)  Resp: (!) 32 (05/11/23 1845)  BP: (!) 56/24 (05/11/23 1830)  SpO2: 97 % (05/11/23 1845) Vital Signs (24h Range):  Temp:  [97.3 °F (36.3 °C)-98.4 °F (36.9 °C)] 97.8 °F (36.6 °C)  Pulse:  [] 102  Resp:  [14-58] 32  SpO2:  [29 %-100 %] 97  %  BP: ()/() 56/24  Arterial Line BP: ()/(51-89) 68/51     Weight: 54.3 kg (119 lb 11.4 oz)  Body mass index is 21.21 kg/m².      Intake/Output Summary (Last 24 hours) at 5/11/2023 1908  Last data filed at 5/11/2023 1501  Gross per 24 hour   Intake 980.41 ml   Output 950 ml   Net 30.41 ml        Physical Exam  Vitals reviewed.   Constitutional:       General: She is in acute distress.      Appearance: She is ill-appearing and toxic-appearing.   HENT:      Head: Normocephalic and atraumatic.   Eyes:      Conjunctiva/sclera: Conjunctivae normal.   Cardiovascular:      Rate and Rhythm: Normal rate and regular rhythm.      Heart sounds: Murmur heard.   Pulmonary:      Effort: Respiratory distress present.      Breath sounds: Rales present.   Abdominal:      General: There is no distension.      Palpations: Abdomen is soft.      Tenderness: There is abdominal tenderness.      Comments: Mid-line wounds noted with purulent drainage    Musculoskeletal:      Right lower leg: No edema.      Left lower leg: No edema.   Neurological:      Mental Status: She is disoriented.      Comments: Restless        Vents:  Vent Mode: A/C (05/11/23 1733)  Set Rate: 32 BPM (05/11/23 1733)  Vt Set: 410 mL (05/11/23 1733)  PEEP/CPAP: 5 cmH20 (05/11/23 1733)  Oxygen Concentration (%): 30 (05/11/23 1845)  Peak Airway Pressure: 24 cmH20 (05/11/23 1733)  Plateau Pressure: 0 cmH20 (05/11/23 1733)  Total Ve: 13.6 L/m (05/11/23 1733)  Negative Inspiratory Force (cm H2O): 0 (05/11/23 1733)  F/VT Ratio<105 (RSBI): (!) 75.47 (05/11/23 1733)    Lines/Drains/Airways       Central Venous Catheter Line  Duration             Percutaneous Central Line Insertion/Assessment - Triple Lumen  05/11/23 1130 Internal Jugular Right <1 day              Drain  Duration                  Ileostomy 02/06/23 1118 Standard (Marion, jaelyn) LUQ 94 days         Ureteral Drain/Stent 02/06/23 0813 Left ureter 5 Fr. 94 days         NG/OG Tube 05/11/23 1108 Gary  sump 16 Fr. Right mouth <1 day         Urethral Catheter 05/11/23 1230 Silicone 16 Fr. <1 day              Airway  Duration                  Airway - Non-Surgical 05/11/23 1104 Endotracheal Tube <1 day              Arterial Line  Duration             Arterial Line 05/11/23 1017 Left Radial <1 day              Peripheral Intravenous Line  Duration                  Peripheral IV - Single Lumen 05/10/23 1336 20 G Right Forearm 1 day                    Significant Labs:    CBC/Anemia Profile:  Recent Labs   Lab 05/10/23  1407 05/11/23  0303 05/11/23  1209 05/11/23  1627   WBC 3.71* 3.31*  --  3.43*   HGB 11.2* 11.0*  --  11.3*   HCT 35.9* 35.4* 37 39.7    337  --  178   MCV 76* 75*  --  84   RDW 21.9* 21.9*  --  21.7*        Chemistries:  Recent Labs   Lab 05/10/23  1407 05/10/23  2152 05/11/23  0303 05/11/23  1054 05/11/23  1627   * 131* 130* 124* 123*   K 4.1 3.4* 3.7 6.1* 5.2*   CL 92* 92* 92* 90* 86*   CO2 24 27 23 7* 6*   BUN 28* 26* 28* 31* 31*   CREATININE 1.2 1.1 1.3 1.8* 2.0*   CALCIUM 8.9 8.6* 8.7 8.3* 7.6*   ALBUMIN 2.3*  --   --  1.9* 1.7*   PROT 8.4  --   --  7.5 6.7   BILITOT 1.2*  --   --  2.1* 2.7*   ALKPHOS 146*  --   --  161* 215*   *  --   --  368* 1,973*   *  --   --  734* 4,934*   MG 2.0 1.7 1.7 2.7*  --    PHOS 3.4  --   --  9.0*  --        ABGs:   Recent Labs   Lab 05/11/23  1209   PH 7.007*   PCO2 23.6*   HCO3 5.9*   POCSATURATED 100   BE -25     CMP:   Recent Labs   Lab 05/10/23  1407 05/10/23  2152 05/11/23  0303 05/11/23  1054 05/11/23  1627   *   < > 130* 124* 123*   K 4.1   < > 3.7 6.1* 5.2*   CL 92*   < > 92* 90* 86*   CO2 24   < > 23 7* 6*   *   < > 137* 211* 206*   BUN 28*   < > 28* 31* 31*   CREATININE 1.2   < > 1.3 1.8* 2.0*   CALCIUM 8.9   < > 8.7 8.3* 7.6*   PROT 8.4  --   --  7.5 6.7   ALBUMIN 2.3*  --   --  1.9* 1.7*   BILITOT 1.2*  --   --  2.1* 2.7*   ALKPHOS 146*  --   --  161* 215*   *  --   --  734* 4,934*   *  --   --  368*  1,973*   ANIONGAP 13   < > 15 27* 31*    < > = values in this interval not displayed.     Lactic Acid:   Recent Labs   Lab 05/10/23  2152 05/11/23  1005 05/11/23  1627   LACTATE 1.8 SEE COMMENT >12.0*     Troponin:   Recent Labs   Lab 05/10/23  2152 05/11/23  0303   TROPONINI 0.062* 0.054*       Significant Imaging:   I have reviewed all pertinent imaging results/findings within the past 24 hours.  CT scan off 05/10/2023:  Hepatic steatosis.  Hepatomegaly.     Mild right subhepatic fluid.  Mild anasarca.  Mesenteric haziness or edema.     Small persistent fluid and air along the midline abdominal incision.     Enlarged left periaortic lymph node.     Left lower quadrant colostomy.  No small bowel obstruction.     Cardiomegaly.     Thickening of the interlobular septa.  Findings may be related interstitial disease and or pulmonary edema.      Assessment/Plan:     Neuro  Encephalopathy, metabolic  Due to septic shock  Continue management for shock    Pulmonary  Acute respiratory failure with hypoxia  Acute hypoxia respiratory failure secondary shock with severe metabolic acidosis  Continue ventilatory support with lung protective vent  Serial ABG  Wean down FiO2  Elevate bed head  GI prophylaxis  DVT prophylaxis    Cardiac/Vascular  * Shock  Likely combination of distributive from sepsis (suspected from abdominal wound) and cardiogenic shock  Blood culture, urine culture, wound culture  Continue vasopressors along with dobutamine  Continue broad spectrum antibiotics  Patient is on multiple pressors, will add stress dose steroid  Monitor blood pressure  Surgical consult to assess the wound  Follow up cardiology recommendations      Renal/  Metabolic acidosis  Severe metabolic acidosis due to shock  Continue management of shock  Continue sodium bicarbonate drip    TAMMY (acute kidney injury)  Place quinones  Monitor renal function  Renal dose medications    Hyperkalemia  Due to TAMMY and severe metabolic acidosis  Shift  potassium  Continue Sodium bicarbonate drip    Palliative Care  Advance care planning  Son updated at bedside, patient is severely ill, in multi-organ failure. Patient is on maximum support with no improvement in clinical course. Patient is high risk for arrhythmia given underlying heart disease and high pressors requirement. Son does not want chest compression or shocks. The prognosis is poor. Emotional support provided.         Hypoglycemia  Likely due to shock liver  IV dextrose as needed  Monitor blood glucose    Patient was seen with the attending physician Dr. Goldberg. We will continue to follow.     Marine Yuan MD  Pulmonology  [facility] - Intensive Care ([place])

## 2023-05-12 NOTE — PROCEDURES
"Odette Hart is a 67 y.o. female patient.    Temp: 98.6 °F (37 °C) (05/11/23 1913)  Pulse: 106 (05/11/23 1900)  Resp: (!) 32 (05/11/23 1900)  BP: (!) 44/16 (05/11/23 1900)  SpO2: (!) 93 % (05/11/23 1900)  Weight: 54.3 kg (119 lb 11.4 oz) (05/11/23 1514)  Height: 5' 3" (160 cm) (05/11/23 1514)       Intubation    Date/Time: 5/11/2023 7:34 PM  Location procedure was performed: [facility] PULMONARY MEDICINE  Performed by: Marine Yuan MD  Authorized by: Kevin Goldberg MD   Assisting provider: Kevin Goldberg MD  Pre-operative diagnosis: Respiratory failure  Post-operative diagnosis: Respiratory failure  Consent Done: Emergent Situation  Indications: respiratory failure, respiratory distress and airway protection  Intubation method: video-assisted  Patient status: paralyzed (RSI)  Preoxygenation: bag valve mask  Sedatives: etomidate  Paralytic: rocuronium  Laryngoscope size: Glide 4  Tube size: 7.5 mm  Tube type: cuffed  Number of attempts: 2  Ventilation between attempts: BVM  Cricoid pressure: yes  Cords visualized: yes  Post-procedure assessment: chest rise, CO2 detector and ETCO2 monitor  Breath sounds: clear  Cuff inflated: yes  ETT to lip: 22 cm  Tube secured with: adhesive tape, bite block, ETT tao, umbilical tape and ties  Chest x-ray interpreted by me.  Chest x-ray findings: endotracheal tube in appropriate position  Patient tolerance: Patient tolerated the procedure well with no immediate complications  Complications: No        5/11/2023    "

## 2023-05-12 NOTE — PLAN OF CARE
Problem: Adult Inpatient Plan of Care  Goal: Plan of Care Review  Outcome: Ongoing, Progressing  Goal: Absence of Hospital-Acquired Illness or Injury  Outcome: Ongoing, Progressing  Goal: Optimal Comfort and Wellbeing  Outcome: Ongoing, Progressing     Problem: Fall Injury Risk  Goal: Absence of Fall and Fall-Related Injury  Outcome: Ongoing, Progressing     Problem: Restraint, Nonbehavioral (Nonviolent)  Goal: Absence of Harm or Injury  Outcome: Ongoing, Progressing

## 2023-05-12 NOTE — PT/OT/SLP DISCHARGE
Physical Therapy Discharge Summary    Name: Odette Hart  MRN: [id number]   Principal Problem: Shock     Patient Discharged from acute Physical Therapy on 5/12/23. PT unable to perform evaluation due to pt's critically ill status and will sign off. Please re-consult if situation changes.    5/12/2023

## 2023-05-12 NOTE — DISCHARGE SUMMARY
[facility] Intensive [facility] Medicine  Discharge Summary      Patient Name: Odette Hart  MRN: [id number]  IRENA: [id number]  Patient Class: IP- Inpatient  Admission Date: 5/10/2023  Hospital Length of Stay: 1 days  Discharge Date and Time: 5/12/2023   Attending Physician: No att. providers found   Discharging Provider: YADIRA Coker MD  Primary Care Provider: Braxton Barragan MD    Primary Care Team: Networked reference to record PCT     HPI:   Odette Hart is a 67 year old female with PMHx of HTN, HLD, systolic and diastolic HF (EF 10%, Grade 2 DD, 12/23/22), CAD, MI, ICD in place, COPD, diverticulitis who presents with complaint of shortness of breath for one day. She also reports nausea and vomiting for two days. Patient reports compliance with all medications but has not taken them for 2 days since N/V. She notes that her Lasix dosage was recently lower to 20 mg since her hospitalization in Feb but she has been taking 40 mg recently if there is increase swelling of bilateral lower extremities. Patient reports decreased urine output and debility due to pain in abdominal wound. Patient denies chest pain, bilateral lower extremity swelling. Patient reports increased pain to RUQ where the stoma site is, and reports she did not have a BM or flatus today. Last BM was yesterday, where stool was thicker in consistency than usual. Patient reports chronic abdominal wound dehiscence following recent surgery and is followed with outpatient wound management. Patient denies any increased pain or increased drainage to abdominal wound. Patient also reports a sacral wound that is currently monitored by outpatient wound team for deterioration. Patient denies fever, chills, cough, sore throat, dysuria.     Afebrile, , /64, Pox 98% on 2L O2 via NC. H/H 11.2/35.9. Na 129. Cr 1.2, BUN 28. BNP 4791. CXR (1 view) with findings of no consolidation, pleural effusion or pneumothorax. Cardiac  silhouette is enlarged. US abdomen with no acute process seen. Patient was given 500ml of NaCl 0.9% fluid bolus, IV anti-emetics IV ondansetron 8mg and PO prochlorperazine 10mg, and IV furosemide 60mg x 1. Ekg with T wave inversions on lateral leads. Qtc prolonged at 533. Will check Trop I and EKG again at 1900hrs post diuresis.     Patient is admitted to Obs service with Hospital Medicine for management of acute on chronic systolic and diastolic HF, evaluation of new T wave inversion, nausea and vomiting, and management of abdominal wound, sacral wound.       * No surgery found *      Hospital Course:   Admitted with shock, multifocal atrial tachycardia, N/V to ICU. Cardiology, surgery, palliative consulted. Had worsening hypotension and SOB, pulm/crit consulted in addition and despite escalated oxygen support respiratory status and acidosis worsened necessitating intubation. Renal function worsened and nephrology consulted. Discussed worsening clinical status with family; maintained course to let family visit as able. Had progressively worsening multiorgan failure and required maximal dose triple pressor therapy as well as dobutamine to maintain pressures. Converted to full comfort focus. After family had been able to visit, decision made to proceed with withdrawal of interventions. After discontinuation of pressor support she ultimately had cardiac arrest and   at 1412.    Consults:   Consults (From admission, onward)          Status Ordering Provider     Inpatient consult to [facility]phrology-[facility]  Once        Provider:  Joseph Bean NP    Completed JAIRO UMANA     Inpatient consult to Registered Dietitian/Nutritionist  Once        Provider:  (Not yet assigned)    Completed JAIRO UMANA     Inpatient consult to Pulmonary Critical Care  Once        Provider:  (Not yet assigned)    Completed JAIRO UMANA     Inpatient consult to Registered Dietitian/Nutritionist  Once        Provider:  (Not  yet assigned)    Completed STEFANIE JIANG     Inpatient consult to Colorectal Surgery  Once        Provider:  (Not yet assigned)    Completed STEFANIE JIANG     Inpatient consult to Cardiology  Once        Provider:  Tony Wade MD    Completed STEFANIE JIANG     Inpatient consult to Palliative Care  Once        Provider:  (Not yet assigned)    Completed STEFANIE JIANG     Inpatient consult to Social Work/Case Management  Once        Provider:  (Not yet assigned)    Completed STEFANIE JIANG     Inpatient consult to Registered Dietitian/Nutritionist  Once        Provider:  (Not yet assigned)    Completed STEFANIE JIANG            No new Assessment & Plan notes have been filed under this hospital service since the last note was generated.  Service: Hospital Medicine    Final Active Diagnoses:    Diagnosis Date Noted POA    PRINCIPAL PROBLEM:  Shock [R57.9] 05/11/2023 Yes    Metabolic acidosis [E87.20] 05/11/2023 Yes    Encephalopathy, metabolic [G93.41] 05/11/2023 Yes    Acute respiratory failure with hypoxia [J96.01] 05/11/2023 Yes    Nausea and vomiting [R11.2] 05/10/2023 Yes    Decubitus ulcer of sacral region, stage 1 [L89.151] 05/10/2023 Yes    Abdominal wound dehiscence [T81.30XA] 05/10/2023 Yes     Chronic    Prolonged Q-T interval on ECG [R94.31] 05/10/2023 Yes    TAMMY (acute kidney injury) [N17.9] 05/10/2023 Yes    Atrial fibrillation [I48.91] 05/10/2023 Yes    Goals of care, counseling/discussion [Z71.89] 02/28/2023 Not Applicable    Acute on chronic combined systolic and diastolic congestive heart failure [I50.43] 08/30/2022 Yes    Encounter for palliative care [Z51.5] 08/30/2022 Not Applicable    Advance care planning [Z71.89] 07/21/2022 Not Applicable    Severe protein-calorie malnutrition [E43] 07/18/2022 Yes    CAD (coronary artery disease) [I25.10] 05/24/2022 Yes    Hyperkalemia [E87.5] 05/24/2022 Yes    Debility [R53.81] 05/16/2022 Yes    Hyponatremia [E87.1] 05/15/2022 Yes    T wave inversion in EKG  [R94.31] 2022 Yes    Automatic implantable cardioverter-defibrillator in situ [Z95.810] 2019 Yes      Problems Resolved During this Admission:       Discharged Condition:     Disposition:     Follow Up:    Patient Instructions:   No discharge procedures on file.    Significant Diagnostic Studies:   CBC:  Recent Labs   Lab 05/10/23  1407 23  0303 23  1209 23  1627   WBC 3.71* 3.31*  --  3.43*   HGB 11.2* 11.0*  --  11.3*   HCT 35.9* 35.4* 37 39.7    337  --  178   GRAN 12.1*  0.5* 36.0*  --  39.0   LYMPH 52.8*  2.0 35.0  CANCELED  --  42.0  CANCELED   MONO 33.2*  1.2* 23.0*  CANCELED  --  15.0  CANCELED   EOS 0.0 CANCELED  --  CANCELED   BASO 0.06 CANCELED  --  CANCELED     BMP:  Recent Labs   Lab 05/10/23  1407 05/10/23  2152 23  1054 23  1627 23  2128 23  0043 23  0518   *   < > 124*   < > 121* 121* 122*   K 4.1   < > 6.1*   < > 6.6* 5.7*  5.7* 5.7*  5.7*   CL 92*   < > 90*   < > 86* 83* 83*   CO2 24   < > 7*   < > <5* 6* 6*   BUN 28*   < > 31*   < > 30* 29* 28*   CREATININE 1.2   < > 1.8*   < > 2.4* 2.5* 2.7*   *   < > 211*   < > 215* 367* 321*   CALCIUM 8.9   < > 8.3*   < > 6.8* 6.5* 6.0*   MG 2.0   < > 2.7*  --  2.2  --  1.9   PHOS 3.4  --  9.0*  --   --   --   --     < > = values in this interval not displayed.     CMP:  Recent Labs   Lab 05/10/23  1407 05/10/23  2152 05/11/23  1054 23  1627 238 23  0043 23  0518   *   < > 124* 123* 121* 121* 122*   K 4.1   < > 6.1* 5.2* 6.6* 5.7*  5.7* 5.7*  5.7*   CL 92*   < > 90* 86* 86* 83* 83*   CO2 24   < > 7* 6* <5* 6* 6*   BUN 28*   < > 31* 31* 30* 29* 28*   CREATININE 1.2   < > 1.8* 2.0* 2.4* 2.5* 2.7*   *   < > 211* 206* 215* 367* 321*   CALCIUM 8.9   < > 8.3* 7.6* 6.8* 6.5* 6.0*   MG 2.0   < > 2.7*  --  2.2  --  1.9   PHOS 3.4  --  9.0*  --   --   --   --    ALKPHOS 146*  --  161* 215*  --   --  248*   *  --  734*  4,934*  --   --  17,659*   *  --  368* 1,973*  --   --  4,763*   BILITOT 1.2*  --  2.1* 2.7*  --   --  2.7*   PROT 8.4  --  7.5 6.7  --   --  4.9*   ALBUMIN 2.3*  --  1.9* 1.7*  --   --  1.2*   ANIONGAP 13   < > 27* 31* Unable to calculate 32* 33*    < > = values in this interval not displayed.   ABG:  Recent Labs   Lab 05/11/23  0948 05/11/23  1022 05/11/23  1209   PH 7.264* 7.195* 7.007*   PCO2 21.1* 20.7* 23.6*   PO2 267* 304* 393*   BE -17 -20 -25      Imaging Results              CT Abdomen Pelvis  Without Contrast (Final result)  Result time 05/10/23 19:18:21      Final result by Beba Ramos MD (05/10/23 19:18:21)                   Impression:      Hepatic steatosis.  Hepatomegaly.    Mild right subhepatic fluid.  Mild anasarca.  Mesenteric haziness or edema.    Small persistent fluid and air along the midline abdominal incision.    Enlarged left periaortic lymph node.    Left lower quadrant colostomy.  No small bowel obstruction.    Cardiomegaly.    Thickening of the interlobular septa.  Findings may be related interstitial disease and or pulmonary edema.      Electronically signed by: Beba Ramos  Date:    05/10/2023  Time:    19:18               Narrative:    EXAMINATION:  CT ABDOMEN PELVIS WITHOUT    CLINICAL HISTORY:  Nausea vomiting bowel obstruction suspected.    TECHNIQUE:  5 mm unenhanced axial images from the lung bases through the greater trochanters were performed.  Coronal and sagittal reformatted images were provided.    COMPARISON:  03/27/2023    FINDINGS:  Within the limits of a noncontrast examination, there is fatty infiltration of the mildly enlarged liver.    Pancreas, kidneys, and adrenal glands are unremarkable.  The gallbladder is surgically absent.    There is remote splenic infarct.    There is small persistent fluid and air along the midline abdominal incision, which is not increased in size.    There is an enlarged left periaortic lymph node measuring 11 x 10 mm.   Mild right subhepatic fluid is present.  There is mesenteric haziness or edema.  Anasarca is noted.  Advanced vascular calcifications are present.    There is no small bowel obstruction.  There is a left lower quadrant colostomy.    There are no pelvic masses or adenopathy.  There is no free pelvic fluid.    At the lung bases, there is thickening of the interlobular septa.  There is honeycombing.  Calcified granulomas are seen..  The heart is enlarged.  There is a right cardiophrenic angle lymph node measuring 9 x 14 mm.                                       US Abdomen Limited (Gallbladder) (Final result)  Result time 05/10/23 16:02:24      Final result by Layla Verdugo MD (05/10/23 16:02:24)                   Impression:      No acute process seen.  Cholecystectomy.      Electronically signed by: Layla Verdugo  Date:    05/10/2023  Time:    16:02               Narrative:    EXAMINATION:  US ABDOMEN LIMITED    CLINICAL HISTORY:  Nausea    TECHNIQUE:  Limited ultrasound of the right upper quadrant of the abdomen (including pancreas, liver, gallbladder, common bile duct, and spleen) was performed.    COMPARISON:  None.    FINDINGS:  Liver: Normal in size, measuring 15.2 cm. Homogeneous echotexture. No focal hepatic lesions.    Gallbladder: The gallbladder is surgically absent.    Biliary system: The common duct is not dilated, measuring 3 mm.  No intrahepatic ductal dilatation.    Spleen: Normal in size and echotexture, measuring 9.9 cm.    Miscellaneous: No upper abdominal ascites.                                       X-Ray Chest 1 View (Final result)  Result time 05/10/23 14:57:07      Final result by Keanu Watts MD (05/10/23 14:57:07)                   Impression:      As above.      Electronically signed by: Keanu Watts MD  Date:    05/10/2023  Time:    14:57               Narrative:    EXAMINATION:  XR CHEST 1 VIEW    CLINICAL HISTORY:  Shortness of breath    TECHNIQUE:  Single frontal view of the  chest was performed.    COMPARISON:  02/20/2023    FINDINGS:  Cardiac defibrillator noted in the left chest.  No consolidation, pleural effusion or pneumothorax.  Cardiac silhouette is enlarged.                                       Pending Diagnostic Studies:       None           Medications:  None    Indwelling Lines/Drains at time of discharge:   Lines/Drains/Airways       Central Venous Catheter Line  Duration             Percutaneous Central Line Insertion/Assessment - Triple Lumen  05/11/23 1130 Internal Jugular Right 1 day              Drain  Duration                  Ileostomy 02/06/23 1118 Standard ([place], end) LUQ 95 days         Ureteral Drain/Stent 02/06/23 0813 Left ureter 5 Fr. 95 days                    Time spent on the discharge of patient: 35 minutes         YADIRA Coker MD  Department of Hospital Medicine  [facility] - Intensive Care ([place])

## 2023-05-12 NOTE — PROCEDURES
"Odette Hart is a 67 y.o. female patient.    Temp: 98.6 °F (37 °C) (05/11/23 1913)  Pulse: 106 (05/11/23 1900)  Resp: (!) 32 (05/11/23 1900)  BP: (!) 44/16 (05/11/23 1900)  SpO2: (!) 93 % (05/11/23 1900)  Weight: 54.3 kg (119 lb 11.4 oz) (05/11/23 1514)  Height: 5' 3" (160 cm) (05/11/23 1514)       Arterial Line    Date/Time: 5/11/2023 7:32 PM  Location procedure was performed: [facility] PULMONARY MEDICINE  Performed by: Marine Yuan MD  Authorized by: Kevin Goldberg MD   Pre-op Diagnosis: Shock  Post-operative diagnosis: Shock  Consent Done: Emergent Situation  Preparation: Patient was prepped and draped in the usual sterile fashion.  Indications: multiple ABGs, respiratory failure and hemodynamic monitoring  Location: left radial  Anesthesia: see MAR for details  Number of attempts: 1  Complications: No  Estimated blood loss (mL): 0  Post-procedure: line sutured and dressing applied  Post-procedure CMS: normal  Patient tolerance: Patient tolerated the procedure well with no immediate complications        5/11/2023    "

## 2023-05-12 NOTE — PROCEDURES
"Odette Hart is a 67 y.o. female patient.    Temp: 98.6 °F (37 °C) (05/11/23 1913)  Pulse: 106 (05/11/23 1900)  Resp: (!) 32 (05/11/23 1900)  BP: (!) 44/16 (05/11/23 1900)  SpO2: (!) 93 % (05/11/23 1900)  Weight: 54.3 kg (119 lb 11.4 oz) (05/11/23 1514)  Height: 5' 3" (160 cm) (05/11/23 1514)       Central Line    Date/Time: 5/11/2023 7:37 PM  Performed by: Marine Yuan MD  Authorized by: Kevin Goldberg MD     Location procedure was performed:  [facility] PULMONARY MEDICINE  Pre-operative diagnosis:  Shock  Post-operative diagnosis:  Shock  Consent Done ?:  Yes  Time out complete?: Verified correct patient, procedure, equipment, staff, and site/side    Indications:  Med administration and vascular access  Anesthesia:  See MAR for details  Preparation:  Skin prepped with ChloraPrep  Skin prep agent dried: Skin prep agent completely dried prior to procedure    Sterile barriers: All five maximal sterile barriers used - gloves, gown, cap, mask and large sterile sheet    Hand hygiene: Hand hygiene performed immediately prior to central venous catheter insertion    Location:  Right internal jugular  Catheter type:  Triple lumen  Catheter size:  8 Fr  Inserted Catheter Length (cm):  16  Ultrasound guidance: Yes    Vessel Caliber:  Large  Comprressibility:  Normal  Needle advanced into vessel with real time ultrasound guidance.    Guidewire confirmed in vessel.    Steril sheath on probe.    Sterile gel used.  Manometry: No    Number of attempts:  1  Securement:  Line sutured, chlorhexidine patch, sterile dressing applied and blood return through all ports  Complications: No    Estimated blood loss (mL):  1  XRay:  Placement verified by x-ray, no pneumothorax on x-ray, successful placement and tip termination  Adverse Events:  NoneTermination Site: superior vena cava    5/11/2023    "

## 2023-05-12 NOTE — PROGRESS NOTES
"    Cardiology Progress Note    5/12/2023  9:13 AM    Subjective/Interim:      Intubated, hypotensive with SBP 70's despite on Levophed, Blair-Synephrine and vasopressin.  She is also on dobutamine.  Now DNR.  Son at bedside.       Objective:   24-hour Vitals:  Temp:  [98.2 °F (36.8 °C)-99 °F (37.2 °C)] 98.2 °F (36.8 °C)  Pulse:  [] 120  Resp:  [14-58] 32  SpO2:  [29 %-99 %] 64 %  BP: ()/() 83/52  Arterial Line BP: ()/(45-89) 79/64    Physical Examination:  Vitals: BP (!) 83/52   Pulse (!) 120   Temp 98.2 °F (36.8 °C) (Axillary)   Resp (!) 32   Ht 5' 3" (1.6 m)   Wt 54.3 kg (119 lb 11.4 oz)   LMP  (LMP Unknown)   SpO2 (!) 64%   Breastfeeding No   BMI 21.21 kg/m²     Physical Exam  Constitutional:       Appearance: She is toxic-appearing.      Interventions: She is intubated.      Comments: Cyanotic lips   Cardiovascular:      Rate and Rhythm: Normal rate and regular rhythm.      Heart sounds: S1 normal and S2 normal.   Pulmonary:      Effort: She is intubated.   Musculoskeletal:      Comments: All 4 extremities mottled appearing.         Intake/Output Summary (Last 24 hours) at 5/12/2023 0913  Last data filed at 5/12/2023 0616  Gross per 24 hour   Intake 9511.08 ml   Output 145 ml   Net 9366.08 ml       Laboratory:  Trended Lab Data:  Recent Labs   Lab 05/10/23  1407 05/11/23  0303 05/11/23  1209 05/11/23  1627   WBC 3.71* 3.31*  --  3.43*   HGB 11.2* 11.0*  --  11.3*   HCT 35.9* 35.4* 37 39.7    337  --  178       Recent Labs   Lab 05/10/23  1407 05/10/23  2152 05/11/23  1054 05/11/23  1627 05/11/23  2128 05/12/23  0043 05/12/23  0518   *   < > 124*   < > 121* 121* 122*   K 4.1   < > 6.1*   < > 6.6* 5.7*  5.7* 5.7*  5.7*   CL 92*   < > 90*   < > 86* 83* 83*   CO2 24   < > 7*   < > <5* 6* 6*   BUN 28*   < > 31*   < > 30* 29* 28*   *   < > 211*   < > 215* 367* 321*   CALCIUM 8.9   < > 8.3*   < > 6.8* 6.5* 6.0*   MG 2.0   < > 2.7*  --  2.2  --  1.9   PHOS 3.4  --  " 9.0*  --   --   --   --     < > = values in this interval not displayed.       Recent Labs   Lab 05/11/23  1054 05/11/23  1627 05/12/23  0518   PROT 7.5 6.7 4.9*   ALBUMIN 1.9* 1.7* 1.2*   BILITOT 2.1* 2.7* 2.7*   * 4,934* 17,659*   * 1,973* 4,763*   ALKPHOS 161* 215* 248*       Recent Labs   Lab 05/11/23  0303   INR 1.4*       Cardiac:   Recent Labs   Lab 05/10/23  1407 05/10/23  2152 05/11/23  0303   TROPONINI  --  0.062* 0.054*   BNP 4,791*  --   --        FLP:   Lab Results   Component Value Date    CHOL 106 (L) 08/30/2022    HDL 13 (L) 08/30/2022    LDLCALC 70.6 08/30/2022    TRIG 112 08/30/2022    CHOLHDL 12.3 (L) 08/30/2022     DM:   Lab Results   Component Value Date    HGBA1C 5.0 08/26/2022    HGBA1C 4.9 06/08/2022    HGBA1C 5.0 05/24/2022    LDLCALC 70.6 08/30/2022    CREATININE 2.7 (H) 05/12/2023     Thyroid:   Lab Results   Component Value Date    TSH 2.756 05/11/2023    FREET4 1.05 10/02/2019     Anemia:   Lab Results   Component Value Date    IRON 28 (L) 02/16/2023    TIBC 229 (L) 02/16/2023    FERRITIN 320 (H) 02/16/2023    MDMJDFDF84 1452 (H) 08/29/2022    FOLATE 12.1 08/29/2022     Urinalysis:   Lab Results   Component Value Date    LABURIN No significant growth 03/10/2023    COLORU Red (A) 02/22/2023    CLARITYU Cloudy 12/16/2021    SPECGRAV 1.020 02/22/2023    NITRITE Negative 02/22/2023    KETONESU Trace (A) 02/22/2023    UROBILINOGEN Negative 12/08/2022     @      Other Results:  EKG (my interpretation):    TELEMETRY:  sinus    Echo:   Results for orders placed or performed during the hospital encounter of 12/22/22   Echo Saline Bubble? No   Result Value Ref Range    Ascending aorta 3.24 cm    STJ 2.95 cm    AV mean gradient 4 mmHg    Ao peak spencer 1.34 m/s    Ao VTI 22.75 cm    IVS 0.76 0.6 - 1.1 cm    LA size 4.50 cm    Left Atrium Major Axis 6.57 cm    Left Atrium Minor Axis 6.43 cm    LVIDd 6.95 (A) 3.5 - 6.0 cm    LVIDs 6.65 (A) 2.1 - 4.0 cm    LVOT diameter 2.09 cm    LVOT peak  VTI 16.86 cm    Posterior Wall 0.82 0.6 - 1.1 cm    MV Peak A Thad 0.53 m/s    E wave deceleration time 131.31 msec    MV Peak E Thad 0.77 m/s    RA Major Axis 5.36 cm    RA Width 3.15 cm    RVDD 4.05 cm    Sinus 3.20 cm    TAPSE 1.30 cm    TR Max Thad 3.46 m/s    TDI LATERAL 0.03 m/s    TDI SEPTAL 0.04 m/s    LA WIDTH 4.56 cm    MV stenosis pressure 1/2 time 38.08 ms    LV Diastolic Volume 251.70 mL    LV Systolic Volume 227.60 mL    LVOT peak thad 1.09 m/s    Mr max thad 0.04 m/s    LA volume (mod) 85.48 cm3    LV LATERAL E/E' RATIO 25.67 m/s    LV SEPTAL E/E' RATIO 19.25 m/s    FS 4 %    LA volume 113.36 cm3    LV mass 237.68 g    Left Ventricle Relative Wall Thickness 0.24 cm    AV valve area 2.54 cm2    AV Velocity Ratio 0.81     AV index (prosthetic) 0.74     MV valve area p 1/2 method 5.78 cm2    E/A ratio 1.45     Mean e' 0.04 m/s    LVOT area 3.4 cm2    LVOT stroke volume 57.81 cm3    AV peak gradient 7 mmHg    E/E' ratio 22.00 m/s    Triscuspid Valve Regurgitation Peak Gradient 48 mmHg    Right Atrial Pressure (from IVC) 3 mmHg    EF 10 %    TV rest pulmonary artery pressure 51 mmHg    Narrative    · The left ventricle is severely enlarged with eccentric hypertrophy and   severely decreased systolic function. The estimated ejection fraction is   10%.  · Normal right ventricular size with mildly reduced right ventricular   systolic function.  · Grade II left ventricular diastolic dysfunction.  · Biatrial enlargement.  · The estimated PA systolic pressure is 51 mmHg.  · Normal central venous pressure (3 mmHg).          Radiology:  X-Ray Chest 1 View    Result Date: 5/11/2023  EXAMINATION: XR CHEST 1 VIEW CLINICAL HISTORY: R IJ TLC/ ETT; TECHNIQUE: Single frontal view of the chest was performed. COMPARISON: 05/10/2023 FINDINGS: Endotracheal tube tip lies approximately 5 cm above the ab.  Nasogastric tube tip and side hole projects over the expected location of the gastric lumen.  Right central venous catheter  tip projects over the distal SVC.  Left chest wall pacer noted.  The cardiomediastinal silhouette is prominent, similar to the previous exam noting calcification of the aorta and overall magnification by technique..  There is no pleural effusion.  The trachea is midline.  The lungs are symmetrically expanded bilaterally with coarse interstitial attenuation, may reflect edema noting shallow inspiratory effort..  No large focal consolidation seen.  There is no pneumothorax.  The osseous structures are remarkable for degenerative changes..     As above Electronically signed by: Bret Kamara MD Date:    05/11/2023 Time:    12:40    X-Ray Chest 1 View    Result Date: 5/10/2023  EXAMINATION: XR CHEST 1 VIEW CLINICAL HISTORY: Shortness of breath TECHNIQUE: Single frontal view of the chest was performed. COMPARISON: 02/20/2023 FINDINGS: Cardiac defibrillator noted in the left chest.  No consolidation, pleural effusion or pneumothorax.  Cardiac silhouette is enlarged.     As above. Electronically signed by: Keanu Watts MD Date:    05/10/2023 Time:    14:57    US Abdomen Limited (Gallbladder)    Result Date: 5/10/2023  EXAMINATION: US ABDOMEN LIMITED CLINICAL HISTORY: Nausea TECHNIQUE: Limited ultrasound of the right upper quadrant of the abdomen (including pancreas, liver, gallbladder, common bile duct, and spleen) was performed. COMPARISON: None. FINDINGS: Liver: Normal in size, measuring 15.2 cm. Homogeneous echotexture. No focal hepatic lesions. Gallbladder: The gallbladder is surgically absent. Biliary system: The common duct is not dilated, measuring 3 mm.  No intrahepatic ductal dilatation. Spleen: Normal in size and echotexture, measuring 9.9 cm. Miscellaneous: No upper abdominal ascites.     No acute process seen.  Cholecystectomy. Electronically signed by: Layla Verdugo Date:    05/10/2023 Time:    16:02    CT Abdomen Pelvis  Without Contrast    Result Date: 5/10/2023  EXAMINATION: CT ABDOMEN PELVIS WITHOUT  CLINICAL HISTORY: Nausea vomiting bowel obstruction suspected. TECHNIQUE: 5 mm unenhanced axial images from the lung bases through the greater trochanters were performed.  Coronal and sagittal reformatted images were provided. COMPARISON: 03/27/2023 FINDINGS: Within the limits of a noncontrast examination, there is fatty infiltration of the mildly enlarged liver. Pancreas, kidneys, and adrenal glands are unremarkable.  The gallbladder is surgically absent. There is remote splenic infarct. There is small persistent fluid and air along the midline abdominal incision, which is not increased in size. There is an enlarged left periaortic lymph node measuring 11 x 10 mm.  Mild right subhepatic fluid is present.  There is mesenteric haziness or edema.  Anasarca is noted.  Advanced vascular calcifications are present. There is no small bowel obstruction.  There is a left lower quadrant colostomy. There are no pelvic masses or adenopathy.  There is no free pelvic fluid. At the lung bases, there is thickening of the interlobular septa.  There is honeycombing.  Calcified granulomas are seen..  The heart is enlarged.  There is a right cardiophrenic angle lymph node measuring 9 x 14 mm.     Hepatic steatosis.  Hepatomegaly. Mild right subhepatic fluid.  Mild anasarca.  Mesenteric haziness or edema. Small persistent fluid and air along the midline abdominal incision. Enlarged left periaortic lymph node. Left lower quadrant colostomy.  No small bowel obstruction. Cardiomegaly. Thickening of the interlobular septa.  Findings may be related interstitial disease and or pulmonary edema. Electronically signed by: Beba Ramos Date:    05/10/2023 Time:    19:18        Current Medications:     Infusions:   DOBUTamine IV infusion (non-titrating) 5 mcg/kg/min (05/12/23 0616)    NORepinephrine bitartrate-D5W 3 mcg/kg/min (05/12/23 0616)    phenylephrine 5 mcg/kg/min (05/12/23 0713)    propofoL Stopped (05/11/23 3403)    sodium  bicarbonate in D5W 200 mL/hr at 05/12/23 0616    vasopressin 0.04 Units/min (05/12/23 0652)        Scheduled:   chlorhexidine  15 mL Mouth/Throat BID    enoxaparin  40 mg Subcutaneous Daily    famotidine (PF)  20 mg Intravenous Daily    fludrocortisone  100 mcg Per OG tube Daily    furosemide (LASIX) injection  40 mg Intravenous BID    hydrocortisone sodium succinate  50 mg Intravenous Q8H    insulin regular  10 Units Intravenous Once    meropenem (MERREM) IVPB  1 g Intravenous Q12H    mupirocin   Nasal BID    vancomycin (VANCOCIN) IVPB  750 mg Intravenous Q24H        PRN:  albuterol-ipratropium, [COMPLETED] calcium gluconate IVPB **AND** calcium gluconate IVPB, [COMPLETED] calcium gluconate IVPB **AND** calcium gluconate IVPB, dextrose 10%, dextrose 10%, [COMPLETED] dextrose 10% **AND** dextrose 10% **AND** insulin regular, dextrose 10%, dextrose, dextrose, glucagon (human recombinant), insulin aspart U-100, lorazepam, morphine, sodium chloride 0.9%, Pharmacy to dose Vancomycin consult **AND** vancomycin - pharmacy to dose     Assessment and Plan:     Odette Hart is a 67 y.o.female with  Sepsis  Shock  Multi organ failure  MAT now in sinus  HFrEF with EF <20%  -plan for comfort care per Dr Coker.  Discussed with Dr Coker and son, Telly at bedside and agree with plans.         Tony Wade MD        Disclaimer: This document was created using voice recognition software (M*Modal Fluency Direct). Although it may be edited, this document may contain errors related to incorrect recognition of the spoken word. Please call the physician if clarification is needed.

## 2023-05-12 NOTE — PLAN OF CARE
Death Note    Called to bedside by patient's nurse. Nursing supervisor notified. Family at bedside.  has been called and is coming at bedside.    Patient is not responding to verbal or tactile stimuli. Patient does not have a papillary or corneal reflex. Their pupils are fixed and dilated. No heart or breath sounds on auscultation. No respirations. No palpable pulses.     Time of death:  05/12/2023 1412    Cause of Death:  Cardiogenic shock    JAIRO Marie MD  [phone]

## 2023-05-12 NOTE — PROGRESS NOTES
[facility]  Pulmonology  Progress Note    Patient Name: Odette Hart  MRN: [id number]  Admission Date: 5/10/2023  Hospital Length of Stay: 1 days  Code Status: Partial Code  Attending Provider: JAIRO Coker MD  Primary Care Provider: Braxton Barragan MD   Principal Problem: Shock    Subjective:     Interval History:   Patient remains critically ill.   Patient is max on 3 pressors including dobutamine and remains in profound shock with multi-organ failure.  Patient is oliguria.       Objective:     Vital Signs (Most Recent):  Temp: 98.2 °F (36.8 °C) (05/12/23 0330)  Pulse: (!) 120 (05/12/23 0743)  Resp: (!) 32 (05/12/23 0743)  BP: (!) 83/52 (05/12/23 0600)  SpO2: (!) 64 % (05/12/23 0743) Vital Signs (24h Range):  Temp:  [98.2 °F (36.8 °C)-99 °F (37.2 °C)] 98.2 °F (36.8 °C)  Pulse:  [] 120  Resp:  [14-58] 32  SpO2:  [29 %-99 %] 64 %  BP: ()/() 83/52  Arterial Line BP: ()/(45-89) 79/64     Weight: 54.3 kg (119 lb 11.4 oz)  Body mass index is 21.21 kg/m².      Intake/Output Summary (Last 24 hours) at 5/12/2023 0913  Last data filed at 5/12/2023 0616  Gross per 24 hour   Intake 9511.08 ml   Output 145 ml   Net 9366.08 ml        Physical Exam  Vitals reviewed.    Constitutional:       Appearance: She is ill-appearing and toxic-appearing.   HENT:      Head: Normocephalic and atraumatic.   Eyes:      Conjunctiva/sclera: Conjunctivae normal.   Cardiovascular:      Rate and Rhythm: Tachycardia and regular rhythm.   Pulmonary:      Bilateral decrease breath sounds  Abdominal:      Abdomen is distended with subcutaneous edema      Tenderness: There is abdominal tenderness.      Comments: Mid-line wounds noted with purulent drainage    Musculoskeletal:      Right lower leg: Edema present     Left lower leg: Edema present  Neurological:      Intubated on vent   Skin:     General: Skin is warm and dry.      Comments: Mottled    Review of Systems: Unable to assess, patient is  intubated on vent    Vents:  Vent Mode: A/C (05/12/23 0743)  Set Rate: 32 BPM (05/12/23 0743)  Vt Set: 410 mL (05/12/23 0743)  PEEP/CPAP: 5 cmH20 (05/12/23 0743)  Oxygen Concentration (%): 30 (05/12/23 0743)  Peak Airway Pressure: 27 cmH20 (05/12/23 0743)  Plateau Pressure: 0 cmH20 (05/12/23 0743)  Total Ve: 13.5 L/m (05/12/23 0743)  Negative Inspiratory Force (cm H2O): 0 (05/12/23 0743)  F/VT Ratio<105 (RSBI): (!) 76.37 (05/12/23 0743)    Lines/Drains/Airways       Central Venous Catheter Line  Duration             Percutaneous Central Line Insertion/Assessment - Triple Lumen  05/11/23 1130 Internal Jugular Right <1 day              Drain  Duration                  Ureteral Drain/Stent 02/06/23 0813 Left ureter 5 Fr. 95 days         Ileostomy 02/06/23 1118 Standard ([place], end) LUQ 94 days         NG/OG Tube 05/11/23 1108 [place] sump 16 Fr. Right mouth <1 day         Urethral Catheter 05/11/23 1230 Silicone 16 Fr. <1 day              Airway  Duration                  Airway - Non-Surgical 05/11/23 1104 Endotracheal Tube <1 day              Arterial Line  Duration             Arterial Line 05/11/23 1017 Left Radial <1 day              Peripheral Intravenous Line  Duration                  Peripheral IV - Single Lumen 05/10/23 1336 20 G Right Forearm 1 day                    Significant Labs:    CBC/Anemia Profile:  Recent Labs   Lab 05/10/23  1407 05/11/23  0303 05/11/23  1209 05/11/23  1627   WBC 3.71* 3.31*  --  3.43*   HGB 11.2* 11.0*  --  11.3*   HCT 35.9* 35.4* 37 39.7    337  --  178   MCV 76* 75*  --  84   RDW 21.9* 21.9*  --  21.7*        Chemistries:  Recent Labs   Lab 05/10/23  1407 05/10/23  2152 05/11/23  1054 05/11/23  1627 05/11/23  2128 05/12/23  0043 05/12/23  0518   *   < > 124* 123* 121* 121* 122*   K 4.1   < > 6.1* 5.2* 6.6* 5.7*  5.7* 5.7*  5.7*   CL 92*   < > 90* 86* 86* 83* 83*   CO2 24   < > 7* 6* <5* 6* 6*   BUN 28*   < > 31* 31* 30* 29* 28*   CREATININE 1.2   < > 1.8* 2.0*  2.4* 2.5* 2.7*   CALCIUM 8.9   < > 8.3* 7.6* 6.8* 6.5* 6.0*   ALBUMIN 2.3*  --  1.9* 1.7*  --   --  1.2*   PROT 8.4  --  7.5 6.7  --   --  4.9*   BILITOT 1.2*  --  2.1* 2.7*  --   --  2.7*   ALKPHOS 146*  --  161* 215*  --   --  248*   *  --  368* 1,973*  --   --  4,763*   *  --  734* 4,934*  --   --  17,659*   MG 2.0   < > 2.7*  --  2.2  --  1.9   PHOS 3.4  --  9.0*  --   --   --   --     < > = values in this interval not displayed.       ABGs:   Recent Labs   Lab 05/11/23  1209   PH 7.007*   PCO2 23.6*   HCO3 5.9*   POCSATURATED 100   BE -25     Blood Culture:   Recent Labs   Lab 05/11/23  1005 05/11/23  1020   LABBLOO No Growth to date No Growth to date     CMP:   Recent Labs   Lab 05/11/23  1054 05/11/23  1627 05/11/23  2128 05/12/23  0043 05/12/23  0518   * 123* 121* 121* 122*   K 6.1* 5.2* 6.6* 5.7*  5.7* 5.7*  5.7*   CL 90* 86* 86* 83* 83*   CO2 7* 6* <5* 6* 6*   * 206* 215* 367* 321*   BUN 31* 31* 30* 29* 28*   CREATININE 1.8* 2.0* 2.4* 2.5* 2.7*   CALCIUM 8.3* 7.6* 6.8* 6.5* 6.0*   PROT 7.5 6.7  --   --  4.9*   ALBUMIN 1.9* 1.7*  --   --  1.2*   BILITOT 2.1* 2.7*  --   --  2.7*   ALKPHOS 161* 215*  --   --  248*   * 4,934*  --   --  17,659*   * 1,973*  --   --  4,763*   ANIONGAP 27* 31* Unable to calculate 32* 33*     Lactic Acid:   Recent Labs   Lab 05/10/23  2152 05/11/23  1005 05/11/23  1627   LACTATE 1.8 SEE COMMENT >12.0*     Troponin:   Recent Labs   Lab 05/10/23  2152 05/11/23  0303   TROPONINI 0.062* 0.054*       Significant Imaging:  I have reviewed all pertinent imaging results/findings within the past 24 hours.    Assessment/Plan:     Neuro  Encephalopathy, metabolic  Due to septic shock     Pulmonary  Acute respiratory failure with hypoxia  Acute hypoxia respiratory failure secondary shock with severe metabolic acidosis  Continue ventilatory support with lung protective vent  Serial ABG  Wean down FiO2  Elevate bed head  GI prophylaxis  DVT  prophylaxis     Cardiac/Vascular  * Shock  Likely combination of distributive from sepsis (suspected from abdominal wound) and cardiogenic shock  Blood culture, urine culture, wound culture  Patient on maximum vasopressors support with dobutamine and stress dose steroid  Continue broad spectrum antibiotics  Monitor blood pressure  Cardiology inputs appreciated        Renal/  Metabolic acidosis  Severe metabolic acidosis due to shock  Continue management of shock  On sodium bicarbonate drip     TAMMY (acute kidney injury)  Worsening renal function. Son discloses, mother does not want dialysis. Patient max out of vasopressor with poor cardiac function, will not be able to tolerate dialysis.     Hyperkalemia  Due to TAMMY and severe metabolic acidosis  Shift potassium  On Sodium bicarbonate drip     Shock liver  As above    Palliative Care  Advance care planning  Son updated at bedside, patient is severely ill, in multi-organ failure. Patient is on maximum support with no improvement in clinical course. Given overall poor prognosis, discussed with the son and primary team to consider shifting our care towards comfort. Emotional support provided.           Hypoglycemia  Likely due to shock liver  IV dextrose as needed  Monitor blood glucose     Hyperglycemia  Patient is on sliding scale insulin     Patient was seen with the attending physician Dr. Goldberg.        Marine Yuan MD  Pulmonology  [facility] ([place])

## 2023-05-12 NOTE — PLAN OF CARE
O2 Device/Concentration: Flow (L/min): 40, Oxygen Concentration (%): 30,  , Flow (L/min): 40    Vent settings:  Mode:Vent Mode: A/C  Respiratory Rate:Set Rate: 32 BPM  Vt:Vt Set: 410 mL  PEEP:PEEP/CPAP: 5 cmH20  PC:   PS:   IT:     Total Respiratory Rate:Resp Rate Total: 32 br/min  PIP:Peak Airway Pressure: 26 cmH20  Mean:Mean Airway Pressure: 13 cmH20  Exhaled Vt:Exhaled Vt: 421 mL        Plan of Care:

## 2023-05-12 NOTE — PROGRESS NOTES
[facility], [place], operated by [facility] ([facility]  Palliative Medicine  Progress Note    Patient Name: Odette Hart  MRN: [id number]  Admission Date: 5/10/2023  Hospital Length of Stay: 1 days  Code Status: Partial Code   Attending Provider: JAIRO Coker MD  Consulting Provider: Leticia Morales MD  Primary Care Physician: Braxton Barragan MD  Principal Problem:Shock    Assessment/Plan:     Cardiac/Vascular  Shock  - Unfortunately, patient is dying from her shock and multiorgan failure     Advance Care Planning       5/12/23  - Chart and interval history reviewed; pt now on three pressors, not a candidate for RRT, and is imminently dying. Family has been routinely updated overnight and this morning.   - Along with Delfina Yancey (palliative RN), met with pt at bedside; she actually opened up her eyes once or twice during visit, and appeared comfortable overall. Skin very mottled.   - Present at bedside was her very supportive son, Telly; reminded him of role of palliative medicine. He has tremendous insight into current situation, and said he knows she is approaching the end of her life.   - He mentioned that his brother Abelardo will likely not be able to visit, though he is calling other family members to come in. Encouraged this, and provided him with emotional support, as he was appropriately tearful.   - Discussed any need for spiritual support; he requested a visit from [facility], which we arranged with spiritual care.   - Based on our discussion, plan is to continue with end-of-life visitation (with no escalation of care in the interim), with eventual transition to comfort-focused care when family is ready. They are aware that her prognosis is as short as hours.   - Please let us know what assistance is needed    5/11/23  - See initial consult by Dr Willard     I will follow-up with patient. Please contact us if you have any additional questions.    Subjective:     Interval History: Further worsened overnight; family  aware she is dying.     Medications:  Continuous Infusions:   morphine      propofoL Stopped (05/11/23 1508)     Scheduled Meds:  PRN Meds:albuterol-ipratropium, glycopyrrolate (PF), glycopyrrolate (PF), lorazepam, lorazepam, sodium chloride 0.9%    Objective:     Vital Signs (Most Recent):  Temp: 98.5 °F (36.9 °C) (05/12/23 0701)  Pulse: 91 (05/12/23 1152)  Resp: (!) 32 (05/12/23 1226)  BP: (!) 79/65 (05/12/23 1145)  SpO2: (!) 57 % (05/12/23 1152) Vital Signs (24h Range):  Temp:  [98.2 °F (36.8 °C)-99 °F (37.2 °C)] 98.5 °F (36.9 °C)  Pulse:  [] 91  Resp:  [32-36] 32  SpO2:  [57 %-99 %] 57 %  BP: ()/(16-72) 79/65  Arterial Line BP: (61-95)/(45-71) 79/64     Weight: 54.3 kg (119 lb 11.4 oz)  Body mass index is 21.21 kg/m².       Physical Exam  Constitutional:       Comments: Ill-appearing, lying in bed, intubated, no nonverbal signs of distress   Skin:     Comments: Diffusely mottled skin      Significant Labs: All pertinent labs within the past 24 hours have been reviewed.  CBC:   Recent Labs   Lab 05/11/23  1627   WBC 3.43*   HGB 11.3*   HCT 39.7   MCV 84        BMP:  Recent Labs   Lab 05/12/23  0518   *   *   K 5.7*  5.7*   CL 83*   CO2 6*   BUN 28*   CREATININE 2.7*   CALCIUM 6.0*   MG 1.9     LFT:  Lab Results   Component Value Date    AST 17,659 (H) 05/12/2023    ALKPHOS 248 (H) 05/12/2023    BILITOT 2.7 (H) 05/12/2023     Albumin:   Albumin   Date Value Ref Range Status   05/12/2023 1.2 (L) 3.5 - 5.2 g/dL Final     Protein:   Total Protein   Date Value Ref Range Status   05/12/2023 4.9 (L) 6.0 - 8.4 g/dL Final     Lactic acid:   Lab Results   Component Value Date    LACTATE >12.0 (HH) 05/11/2023    LACTATE SEE COMMENT 05/11/2023       Significant Imaging: I have reviewed all pertinent imaging results/findings within the past 24 hours.

## 2023-05-12 NOTE — NURSING
13:20 : John Paul at bedside. Family decided to transition pt in to comfortable care interventions.

## 2023-05-12 NOTE — SUBJECTIVE & OBJECTIVE
"Interval History: Worsening status this morning; initially with N/V, subsequently became hypotensive and increased WOB/acidosis requiring intubation. Multiple phone conversations with son Telly regarding prognosis and illness; discussed with surgery, palliative, pulm/crit, nephrology.    Review of Systems   Unable to perform ROS: Acuity of condition   Objective:     Vital Signs (Most Recent):  Temp: 98.6 °F (37 °C) (05/11/23 1913)  Pulse: 106 (05/11/23 1900)  Resp: (!) 32 (05/11/23 1900)  BP: (!) 44/16 (05/11/23 1900)  SpO2: (!) 93 % (05/11/23 1900) Vital Signs (24h Range):  Temp:  [97.3 °F (36.3 °C)-98.6 °F (37 °C)] 98.6 °F (37 °C)  Pulse:  [] 106  Resp:  [14-58] 32  SpO2:  [29 %-100 %] 93 %  BP: ()/() 44/16  Arterial Line BP: ()/(50-89) 65/50     Weight: 54.3 kg (119 lb 11.4 oz)  Body mass index is 21.21 kg/m².    Intake/Output Summary (Last 24 hours) at 5/11/2023 1953  Last data filed at 5/11/2023 1830  Gross per 24 hour   Intake 4031.23 ml   Output 1000 ml   Net 3031.23 ml         Physical Exam  Vitals and nursing note reviewed.   Constitutional:       Appearance: She is ill-appearing and diaphoretic.   HENT:      Head: Normocephalic and atraumatic.   Eyes:      General:         Right eye: No discharge.         Left eye: No discharge.   Cardiovascular:      Rate and Rhythm: Normal rate.      Pulses: Normal pulses.   Abdominal:      Tenderness: There is abdominal tenderness.      Comments: Midline dressing in place with ostomy bag in place.   Musculoskeletal:      Right lower leg: No edema.      Left lower leg: No edema.   Skin:     General: Skin is cool.   Neurological:      Comments: Oriented x 2 (self, "hospital")     Significant Labs:   CBC:  Recent Labs   Lab 05/10/23  1407 05/11/23  0303 05/11/23  1209 05/11/23  1627   WBC 3.71* 3.31*  --  3.43*   HGB 11.2* 11.0*  --  11.3*   HCT 35.9* 35.4* 37 39.7    337  --  178   GRAN 12.1*  0.5* 36.0*  --  39.0   LYMPH 52.8*  2.0 35.0  " CANCELED  --  42.0  CANCELED   MONO 33.2*  1.2* 23.0*  CANCELED  --  15.0  CANCELED   EOS 0.0 CANCELED  --  CANCELED   BASO 0.06 CANCELED  --  CANCELED   CMP:  Recent Labs   Lab 05/10/23  1407 05/10/23  2152 05/11/23  0303 05/11/23  1054 05/11/23  1627   * 131* 130* 124* 123*   K 4.1 3.4* 3.7 6.1* 5.2*   CL 92* 92* 92* 90* 86*   CO2 24 27 23 7* 6*   BUN 28* 26* 28* 31* 31*   CREATININE 1.2 1.1 1.3 1.8* 2.0*   * 101 137* 211* 206*   CALCIUM 8.9 8.6* 8.7 8.3* 7.6*   MG 2.0 1.7 1.7 2.7*  --    PHOS 3.4  --   --  9.0*  --    ALKPHOS 146*  --   --  161* 215*   *  --   --  734* 4,934*   *  --   --  368* 1,973*   BILITOT 1.2*  --   --  2.1* 2.7*   PROT 8.4  --   --  7.5 6.7   ALBUMIN 2.3*  --   --  1.9* 1.7*   ANIONGAP 13 12 15 27* 31*     Recent Labs   Lab 05/10/23  2152 05/11/23  1005 05/11/23  1627   LACTATE 1.8 SEE COMMENT >12.0*     Significant Imaging:   Imaging Results              CT Abdomen Pelvis  Without Contrast (Final result)  Result time 05/10/23 19:18:21      Final result by Beba Ramos MD (05/10/23 19:18:21)                   Impression:      Hepatic steatosis.  Hepatomegaly.    Mild right subhepatic fluid.  Mild anasarca.  Mesenteric haziness or edema.    Small persistent fluid and air along the midline abdominal incision.    Enlarged left periaortic lymph node.    Left lower quadrant colostomy.  No small bowel obstruction.    Cardiomegaly.    Thickening of the interlobular septa.  Findings may be related interstitial disease and or pulmonary edema.      Electronically signed by: Beba Ramos  Date:    05/10/2023  Time:    19:18               Narrative:    EXAMINATION:  CT ABDOMEN PELVIS WITHOUT    CLINICAL HISTORY:  Nausea vomiting bowel obstruction suspected.    TECHNIQUE:  5 mm unenhanced axial images from the lung bases through the greater trochanters were performed.  Coronal and sagittal reformatted images were  provided.    COMPARISON:  03/27/2023    FINDINGS:  Within the limits of a noncontrast examination, there is fatty infiltration of the mildly enlarged liver.    Pancreas, kidneys, and adrenal glands are unremarkable.  The gallbladder is surgically absent.    There is remote splenic infarct.    There is small persistent fluid and air along the midline abdominal incision, which is not increased in size.    There is an enlarged left periaortic lymph node measuring 11 x 10 mm.  Mild right subhepatic fluid is present.  There is mesenteric haziness or edema.  Anasarca is noted.  Advanced vascular calcifications are present.    There is no small bowel obstruction.  There is a left lower quadrant colostomy.    There are no pelvic masses or adenopathy.  There is no free pelvic fluid.    At the lung bases, there is thickening of the interlobular septa.  There is honeycombing.  Calcified granulomas are seen..  The heart is enlarged.  There is a right cardiophrenic angle lymph node measuring 9 x 14 mm.                                       US Abdomen Limited (Gallbladder) (Final result)  Result time 05/10/23 16:02:24      Final result by Layla Verdugo MD (05/10/23 16:02:24)                   Impression:      No acute process seen.  Cholecystectomy.      Electronically signed by: Layla Verdugo  Date:    05/10/2023  Time:    16:02               Narrative:    EXAMINATION:  US ABDOMEN LIMITED    CLINICAL HISTORY:  Nausea    TECHNIQUE:  Limited ultrasound of the right upper quadrant of the abdomen (including pancreas, liver, gallbladder, common bile duct, and spleen) was performed.    COMPARISON:  None.    FINDINGS:  Liver: Normal in size, measuring 15.2 cm. Homogeneous echotexture. No focal hepatic lesions.    Gallbladder: The gallbladder is surgically absent.    Biliary system: The common duct is not dilated, measuring 3 mm.  No intrahepatic ductal dilatation.    Spleen: Normal in size and echotexture, measuring 9.9  cm.    Miscellaneous: No upper abdominal ascites.                                       X-Ray Chest 1 View (Final result)  Result time 05/10/23 14:57:07      Final result by Keanu Watts MD (05/10/23 14:57:07)                   Impression:      As above.      Electronically signed by: Keanu Watts MD  Date:    05/10/2023  Time:    14:57               Narrative:    EXAMINATION:  XR CHEST 1 VIEW    CLINICAL HISTORY:  Shortness of breath    TECHNIQUE:  Single frontal view of the chest was performed.    COMPARISON:  02/20/2023    FINDINGS:  Cardiac defibrillator noted in the left chest.  No consolidation, pleural effusion or pneumothorax.  Cardiac silhouette is enlarged.

## 2023-05-12 NOTE — HPI
67 year old woman with history of hypertension, heart failure (EF of 10%), CAD, ICD insitu, hyperlipidemia, diverticulitis, colovesical and colovaginal fistuli and acute cholecystitis status post surgeries and chronic abdominal wounds that presents on 05/10 for evaluation of shortness of breath, nausea and vomiting for 2 days. Patient was admitted for acute on chronic heart failure. Hospital course was complicated by hypotension, respiratory failure and AMS requiring admission to ICU.  Patient was started on broad spectrum antibiotics along with vasopressor. Patient was intubated for hypoxic respiratory failure (ABG showed PH . Patient was also found to be hypoglycemic that was treated with IV dextrose. Labs showed severe metabolic acidosis, TAMMY, shock liver, and leukopenia.

## 2023-05-12 NOTE — ASSESSMENT & PLAN NOTE
Multifocal atrial tachycardia, CHF exacerbation, debility, encephalopathy, acidosis, resp failure, multiorgan failure  - Shock likely cardiogenic vs septic; CT Abd/Pelvis without clear evidence of infection. Appreciate surgery assistance. Broadened to meropenem 1g IV q12hr, vancomycin IV with pharmacy dosing consult; continue for now.  - Continued dobutamine gtt, norepinephrine gtt, vasopressin gtt, phenylephrine gtt.  - Progressive multiorgan failure noted overnight.  - Discussed with son at bedside. Little to offer that would be likely to contribute to extending an acceptable quality of life. Focus on aggressive comfort predominantly; reasonable to continue support for now to allow in-town family to visit. Discussed withdrawal of interventions as next likely step afterward to minimize suffering.  - Start morphine 2mg IV q1hr PRN for pain, dyspnea and lorazepam 1mg IV q1hr PRN for anxiety. Monitor closely and adjust/add other symptomatic management medications as indicated.  - Appreciate consulting teams' assistance.

## 2023-05-12 NOTE — ASSESSMENT & PLAN NOTE
5/12/23  - Chart and interval history reviewed; pt now on three pressors, not a candidate for RRT, and is imminently dying. Family has been routinely updated overnight and this morning.   - Along with Delfina Yancey (palliative RN), met with pt at bedside; she actually opened up her eyes once or twice during visit, and appeared comfortable overall. Skin very mottled.   - Present at bedside was her very supportive son, Telly; reminded him of role of palliative medicine. He has tremendous insight into current situation, and said he knows she is approaching the end of her life.   - He mentioned that his brother Abelardo will likely not be able to visit, though he is calling other family members to come in. Encouraged this, and provided him with emotional support, as he was appropriately tearful.   - Discussed any need for spiritual support; he requested a visit from , which we arranged with spiritual care.   - Based on our discussion, plan is to continue with end-of-life visitation (with no escalation of care in the interim), with eventual transition to comfort-focused care when family is ready. They are aware that her prognosis is as short as hours to a day or two.   - Please let us know what assistance is needed    5/11/23  - See initial consult by Dr Willard

## 2023-05-12 NOTE — CHAPLAIN
05/12/23 0930   Clinical Encounter Type   Visit Type Initial Visit   Visit Category Palliative Care   Visited With Patient and family together;Health care provider  (Son and his wife was present for the Spiritual care  visit. Family requested a  to anoint the patient.)   Number of Family Visited 2  (Son and daughter-in-law)   Length of Visit 120 Minutes  (Coordinating the calls to the local churches for a  to come and anoint the patient.)   Continue Visiting Yes   Referral From Nurse   Referral To    Zoroastrian Encounters   Spiritual Resources Requested Prayer;Bible   Sacramental Encounters   Sacrament of Sick-Anointing Family requested anointing;Visiting  anointed patient;Anointed   Patient Spiritual Encounters   Care Provided Prayer support;Compassionate presence   Patient Coping Non-verbal   Family Spiritual Encounters   Care Provided Compassionate presence;Prayer support;Reflective listening;Life review/ reflection;Readings;Explored pt/family concerns;Decisions near end of life  ( provided empathetic lisitening, compassionate pressence and supportive prayer. Spiritual care contacted a local  to administer the last rites.)   Family Coping Accepting;Anxiety;Fearful;Open/discussion;Sadness;Active amna;Internal strength;Living with uncertainty;Using amna/ community resources;Family/ friends resources   Comments - Family Patient's family was grateful for the Spiritual Care  visit.  (Family was thankful for the fulfilling of their request for a  to anoint georgina mother.)

## 2023-05-12 NOTE — ASSESSMENT & PLAN NOTE
Acute hypoxia respiratory failure secondary shock with severe metabolic acidosis  Continue ventilatory support with lung protective vent  Serial ABG  Wean down FiO2  Elevate bed head  GI prophylaxis  DVT prophylaxis

## 2023-05-12 NOTE — EICU
e [facility] Vent rounds:  on Vent, Vt < 8 ml/ibw,  < 25   In synchrony. on lung protective ventilation.     On 3 pressors, bicarb gtt.      67 F  Shock. Cardiorenal, low EF 10%, CAD/AICD status,  elevated RSVP >50, severe AGMA. In multiorgan failure. On 3 pressors, bicarb and Vanc-meropenum. Recent colostomy, elevated LA. Nephrology on board.       Discussed with bed side RN.  As per bed side RN, Son is aware of poor prognosis, does not wish to go for CRRT- would not tolerate due to  MAP still low < 55.     Continue current care.   On Lovenox as VTE prophylaxis.  And on SUP.  CBG goals < 180.    20:14  RN notified about abnormal labs.    AGMA and hyperkalemia. TAMMY-Cardio renal  Shock liver.  Encephalopathy    In MOF.  MAP low.    - will probably  going to make it , overnight.   - Son is going to come back to see her again tonight.  - for now Hyperkalemia Rx, bicarb push.   - code status DNR. Already on vent and pressors/stress steroids.   - will discuss with Son, when he comes back for CRRT?. Risk of dying on CRRT with above condition is very high.     00:15 AM  Talked to her eldest Son, Telly Ortiz over phone.  Discussed Diagnosis, critical condition.  CRRT discussed-pro and cons. Risk of dying sooner while hooking up to CRRT higher than benefit. As per Telly, mother is following commands, going to ask her and his younger brother to make whether to try, take risk of starting CRRT, if so, will call Nephrology on call for CRRT, if he agrees. If so need trialysis cath.

## 2023-05-12 NOTE — PROGRESS NOTES
O2 Device/Concentration: Flow (L/min): 40, Oxygen Concentration (%): 30,  , Flow (L/min): 40    Vent settings:  Mode:Vent Mode: A/C  Respiratory Rate:Set Rate: 32 BPM  Vt:Vt Set: 410 mL  PEEP:PEEP/CPAP: 5 cmH20  PC:   PS:   IT:     Total Respiratory Rate:Resp Rate Total: 32 br/min  PIP:Peak Airway Pressure: 27 cmH20  Mean:Mean Airway Pressure: 13 cmH20  Exhaled Vt:Exhaled Vt: 419 mL        Plan of Care: Continue monitoring ventilator for support as directed by MD

## 2023-05-12 NOTE — SUBJECTIVE & OBJECTIVE
Interval History: Further worsened overnight; family aware she is dying.     Medications:  Continuous Infusions:   morphine      propofoL Stopped (05/11/23 1508)     Scheduled Meds:  PRN Meds:albuterol-ipratropium, glycopyrrolate (PF), glycopyrrolate (PF), lorazepam, lorazepam, sodium chloride 0.9%    Objective:     Vital Signs (Most Recent):  Temp: 98.5 °F (36.9 °C) (05/12/23 0701)  Pulse: 91 (05/12/23 1152)  Resp: (!) 32 (05/12/23 1226)  BP: (!) 79/65 (05/12/23 1145)  SpO2: (!) 57 % (05/12/23 1152) Vital Signs (24h Range):  Temp:  [98.2 °F (36.8 °C)-99 °F (37.2 °C)] 98.5 °F (36.9 °C)  Pulse:  [] 91  Resp:  [32-36] 32  SpO2:  [57 %-99 %] 57 %  BP: ()/(16-72) 79/65  Arterial Line BP: (61-95)/(45-71) 79/64     Weight: 54.3 kg (119 lb 11.4 oz)  Body mass index is 21.21 kg/m².       Physical Exam  Constitutional:       Comments: Ill-appearing, lying in bed, intubated, no nonverbal signs of distress   Skin:     Comments: Diffusely mottled skin      Significant Labs: All pertinent labs within the past 24 hours have been reviewed.  CBC:   Recent Labs   Lab 05/11/23  1627   WBC 3.43*   HGB 11.3*   HCT 39.7   MCV 84        BMP:  Recent Labs   Lab 05/12/23  0518   *   *   K 5.7*  5.7*   CL 83*   CO2 6*   BUN 28*   CREATININE 2.7*   CALCIUM 6.0*   MG 1.9     LFT:  Lab Results   Component Value Date    AST 17,659 (H) 05/12/2023    ALKPHOS 248 (H) 05/12/2023    BILITOT 2.7 (H) 05/12/2023     Albumin:   Albumin   Date Value Ref Range Status   05/12/2023 1.2 (L) 3.5 - 5.2 g/dL Final     Protein:   Total Protein   Date Value Ref Range Status   05/12/2023 4.9 (L) 6.0 - 8.4 g/dL Final     Lactic acid:   Lab Results   Component Value Date    LACTATE >12.0 (HH) 05/11/2023    LACTATE SEE COMMENT 05/11/2023       Significant Imaging: I have reviewed all pertinent imaging results/findings within the past 24 hours.

## 2023-05-12 NOTE — PROGRESS NOTES
[facility] Intensive Care [facility] Medicine  Progress Note    Patient Name: Odette Hart  MRN: [id number]  Patient Class: IP- Inpatient   Admission Date: 5/10/2023  Length of Stay: 1 days  Attending Physician: JAIRO Coker MD  Primary Care Provider: Braxton Barragan MD        Subjective:     Principal Problem:Shock        HPI:  Odette Hart is a 67 year old female with PMHx of HTN, HLD, systolic and diastolic HF (EF 10%, Grade 2 DD, 12/23/22), CAD, MI, ICD in place, COPD, diverticulitis who presents with complaint of shortness of breath for one day. She also reports nausea and vomiting for two days. Patient reports compliance with all medications but has not taken them for 2 days since N/V. She notes that her Lasix dosage was recently lower to 20 mg since her hospitalization in Feb but she has been taking 40 mg recently if there is increase swelling of bilateral lower extremities. Patient reports decreased urine output and debility due to pain in abdominal wound. Patient denies chest pain, bilateral lower extremity swelling. Patient reports increased pain to RUQ where the stoma site is, and reports she did not have a BM or flatus today. Last BM was yesterday, where stool was thicker in consistency than usual. Patient reports chronic abdominal wound dehiscence following recent surgery and is followed with outpatient wound management. Patient denies any increased pain or increased drainage to abdominal wound. Patient also reports a sacral wound that is currently monitored by outpatient wound team for deterioration. Patient denies fever, chills, cough, sore throat, dysuria.     Afebrile, , /64, Pox 98% on 2L O2 via NC. H/H 11.2/35.9. Na 129. Cr 1.2, BUN 28. BNP 4791. CXR (1 view) with findings of no consolidation, pleural effusion or pneumothorax. Cardiac silhouette is enlarged. US abdomen with no acute process seen. Patient was given 500ml of NaCl 0.9% fluid bolus, IV anti-emetics IV  ondansetron 8mg and PO prochlorperazine 10mg, and IV furosemide 60mg x 1. Ekg with T wave inversions on lateral leads. Qtc prolonged at 533. Will check Trop I and EKG again at 1900hrs post diuresis.     Patient is admitted to Obs service with Hospital Medicine for management of acute on chronic systolic and diastolic HF, evaluation of new T wave inversion, nausea and vomiting, and management of abdominal wound, sacral wound.         Overview/Hospital Course:  No notes on file    Interval History: Overnight events noted. Maximal support. Discussed with son at bedside.    Review of Systems   Unable to perform ROS: Intubated   Objective:     Vital Signs (Most Recent):  Temp: 98.2 °F (36.8 °C) (05/12/23 0330)  Pulse: (!) 120 (05/12/23 0743)  Resp: (!) 32 (05/12/23 0743)  BP: (!) 83/52 (05/12/23 0600)  SpO2: (!) 64 % (05/12/23 0743) Vital Signs (24h Range):  Temp:  [97.8 °F (36.6 °C)-99 °F (37.2 °C)] 98.2 °F (36.8 °C)  Pulse:  [] 120  Resp:  [14-58] 32  SpO2:  [29 %-100 %] 64 %  BP: ()/() 83/52  Arterial Line BP: ()/(45-89) 79/64     Weight: 54.3 kg (119 lb 11.4 oz)  Body mass index is 21.21 kg/m².    Intake/Output Summary (Last 24 hours) at 5/12/2023 0814  Last data filed at 5/12/2023 0616  Gross per 24 hour   Intake 9511.08 ml   Output 145 ml   Net 9366.08 ml         Physical Exam  Vitals and nursing note reviewed.   Constitutional:       Appearance: She is ill-appearing.   HENT:      Head: Normocephalic and atraumatic.   Eyes:      General:         Right eye: No discharge.         Left eye: No discharge.      Conjunctiva/sclera: Conjunctivae normal.   Cardiovascular:      Rate and Rhythm: Normal rate.      Pulses: Normal pulses.   Pulmonary:      Effort: No respiratory distress.      Comments: Intubated. Mechanical breath sounds.  Abdominal:      General: There is distension.      Tenderness: There is no abdominal tenderness.      Comments: Midline dressing in place with ostomy bag in place.     Skin:     General: Skin is warm and dry.      Comments: Mottled.   Neurological:      Comments: RASS -4, CAM/ICU N/A     Significant Labs:   CBC:  Recent Labs   Lab 05/10/23  1407 05/11/23  0303 05/11/23  1209 05/11/23  1627   WBC 3.71* 3.31*  --  3.43*   HGB 11.2* 11.0*  --  11.3*   HCT 35.9* 35.4* 37 39.7    337  --  178   GRAN 12.1*  0.5* 36.0*  --  39.0   LYMPH 52.8*  2.0 35.0  CANCELED  --  42.0  CANCELED   MONO 33.2*  1.2* 23.0*  CANCELED  --  15.0  CANCELED   EOS 0.0 CANCELED  --  CANCELED   BASO 0.06 CANCELED  --  CANCELED   CMP:  Recent Labs   Lab 05/10/23  1407 05/10/23  2152 05/11/23  1054 05/11/23  1627 05/11/23  2128 05/12/23  0043 05/12/23  0518   *   < > 124* 123* 121* 121* 122*   K 4.1   < > 6.1* 5.2* 6.6* 5.7*  5.7* 5.7*  5.7*   CL 92*   < > 90* 86* 86* 83* 83*   CO2 24   < > 7* 6* <5* 6* 6*   BUN 28*   < > 31* 31* 30* 29* 28*   CREATININE 1.2   < > 1.8* 2.0* 2.4* 2.5* 2.7*   *   < > 211* 206* 215* 367* 321*   CALCIUM 8.9   < > 8.3* 7.6* 6.8* 6.5* 6.0*   MG 2.0   < > 2.7*  --  2.2  --  1.9   PHOS 3.4  --  9.0*  --   --   --   --    ALKPHOS 146*  --  161* 215*  --   --  248*   *  --  734* 4,934*  --   --  17,659*   *  --  368* 1,973*  --   --  4,763*   BILITOT 1.2*  --  2.1* 2.7*  --   --  2.7*   PROT 8.4  --  7.5 6.7  --   --  4.9*   ALBUMIN 2.3*  --  1.9* 1.7*  --   --  1.2*   ANIONGAP 13   < > 27* 31* Unable to calculate 32* 33*    < > = values in this interval not displayed.      Significant Imaging:   No new imaging this morning.      Assessment/Plan:      * Shock  Multifocal atrial tachycardia, CHF exacerbation, debility, encephalopathy, acidosis, resp failure, multiorgan failure  - Shock likely cardiogenic vs septic; CT Abd/Pelvis without clear evidence of infection. Appreciate surgery assistance. Broadened to meropenem 1g IV q12hr, vancomycin IV with pharmacy dosing consult; continue for now.  - Continued dobutamine gtt, norepinephrine gtt,  vasopressin gtt, phenylephrine gtt.  - Progressive multiorgan failure noted overnight.  - Discussed with son at bedside. Little to offer that would be likely to contribute to extending an acceptable quality of life. Focus on aggressive comfort predominantly; reasonable to continue support for now to allow in-town family to visit. Discussed withdrawal of interventions as next likely step afterward to minimize suffering.  - Start morphine 2mg IV q1hr PRN for pain, dyspnea and lorazepam 1mg IV q1hr PRN for anxiety. Monitor closely and adjust/add other symptomatic management medications as indicated.  - Appreciate consulting teams' assistance.    Atrial fibrillation  - As above.    TAMMY (acute kidney injury)  - As above.    Prolonged Q-T interval on ECG  - As above.    Abdominal wound dehiscence  - Wound care.    Decubitus ulcer of sacral region, stage 1  - As above.    Nausea and vomiting  - As above.    Acute on chronic combined systolic and diastolic congestive heart failure  - As above.    Encounter for palliative care  - As above.      Severe protein-calorie malnutrition  - As above.    CAD (coronary artery disease)  - As above.    Debility  - As above.    Hyponatremia  - As above.    T wave inversion in EKG  - As above.    VTE Risk Mitigation (From admission, onward)         Ordered     enoxaparin injection 40 mg  Daily         05/10/23 1623     IP VTE HIGH RISK PATIENT  Once         05/10/23 1615     Place sequential compression device  Until discontinued         05/10/23 1615                Discharge Planning   SIVAN:      Code Status: Partial Code   Is the patient medically ready for discharge?:     Reason for patient still in hospital (select all that apply): Patient unstable  Discharge Plan A: Home        Critical due to shock.    Critical care time spent on the evaluation and treatment of severe organ dysfunction, review of pertinent labs and imaging studies, discussions with consulting providers and  discussions with patient/family: 35 minutes.    YADIRA Coker MD  Department of Hospital Medicine   [facility] - Intensive [facility] ([place])

## 2023-05-12 NOTE — SUBJECTIVE & OBJECTIVE
Interval History: Overnight events noted. Maximal support. Discussed with son at bedside.    Review of Systems   Unable to perform ROS: Intubated   Objective:     Vital Signs (Most Recent):  Temp: 98.2 °F (36.8 °C) (05/12/23 0330)  Pulse: (!) 120 (05/12/23 0743)  Resp: (!) 32 (05/12/23 0743)  BP: (!) 83/52 (05/12/23 0600)  SpO2: (!) 64 % (05/12/23 0743) Vital Signs (24h Range):  Temp:  [97.8 °F (36.6 °C)-99 °F (37.2 °C)] 98.2 °F (36.8 °C)  Pulse:  [] 120  Resp:  [14-58] 32  SpO2:  [29 %-100 %] 64 %  BP: ()/() 83/52  Arterial Line BP: ()/(45-89) 79/64     Weight: 54.3 kg (119 lb 11.4 oz)  Body mass index is 21.21 kg/m².    Intake/Output Summary (Last 24 hours) at 5/12/2023 0814  Last data filed at 5/12/2023 0616  Gross per 24 hour   Intake 9511.08 ml   Output 145 ml   Net 9366.08 ml         Physical Exam  Vitals and nursing note reviewed.   Constitutional:       Appearance: She is ill-appearing.   HENT:      Head: Normocephalic and atraumatic.   Eyes:      General:         Right eye: No discharge.         Left eye: No discharge.      Conjunctiva/sclera: Conjunctivae normal.   Cardiovascular:      Rate and Rhythm: Normal rate.      Pulses: Normal pulses.   Pulmonary:      Effort: No respiratory distress.      Comments: Intubated. Mechanical breath sounds.  Abdominal:      General: There is distension.      Tenderness: There is no abdominal tenderness.      Comments: Midline dressing in place with ostomy bag in place.    Skin:     General: Skin is warm and dry.      Comments: Mottled.   Neurological:      Comments: RASS -4, CAM/ICU N/A     Significant Labs:   CBC:  Recent Labs   Lab 05/10/23  1407 05/11/23  0303 05/11/23  1209 05/11/23  1627   WBC 3.71* 3.31*  --  3.43*   HGB 11.2* 11.0*  --  11.3*   HCT 35.9* 35.4* 37 39.7    337  --  178   GRAN 12.1*  0.5* 36.0*  --  39.0   LYMPH 52.8*  2.0 35.0  CANCELED  --  42.0  CANCELED   MONO 33.2*  1.2* 23.0*  CANCELED  --  15.0  CANCELED    EOS 0.0 CANCELED  --  CANCELED   BASO 0.06 CANCELED  --  CANCELED   CMP:  Recent Labs   Lab 05/10/23  1407 05/10/23  2152 05/11/23  1054 05/11/23  1627 05/11/23 2128 05/12/23  0043 05/12/23  0518   *   < > 124* 123* 121* 121* 122*   K 4.1   < > 6.1* 5.2* 6.6* 5.7*  5.7* 5.7*  5.7*   CL 92*   < > 90* 86* 86* 83* 83*   CO2 24   < > 7* 6* <5* 6* 6*   BUN 28*   < > 31* 31* 30* 29* 28*   CREATININE 1.2   < > 1.8* 2.0* 2.4* 2.5* 2.7*   *   < > 211* 206* 215* 367* 321*   CALCIUM 8.9   < > 8.3* 7.6* 6.8* 6.5* 6.0*   MG 2.0   < > 2.7*  --  2.2  --  1.9   PHOS 3.4  --  9.0*  --   --   --   --    ALKPHOS 146*  --  161* 215*  --   --  248*   *  --  734* 4,934*  --   --  17,659*   *  --  368* 1,973*  --   --  4,763*   BILITOT 1.2*  --  2.1* 2.7*  --   --  2.7*   PROT 8.4  --  7.5 6.7  --   --  4.9*   ALBUMIN 2.3*  --  1.9* 1.7*  --   --  1.2*   ANIONGAP 13   < > 27* 31* Unable to calculate 32* 33*    < > = values in this interval not displayed.      Significant Imaging:   No new imaging this morning.

## 2023-05-12 NOTE — PT/OT/SLP DISCHARGE
Occupational Therapy Discharge       Pt unable to participate in OT evaluation.  OT to sign off due to pt's critical medical status in ICU.      WILL Crowe

## 2023-05-12 NOTE — ASSESSMENT & PLAN NOTE
Son updated at bedside, patient is severely ill, in multi-organ failure. Patient is on maximum support with no improvement in clinical course. Patient is high risk for arrhythmia given underlying heart disease and high pressors requirement. Son does not want chest compression or shocks. The prognosis is poor. Emotional support provided.

## 2023-05-12 NOTE — PROGRESS NOTES
"Nephrology  Progress Note    Admit Date: 5/10/2023   LOS: 1 day     SUBJECTIVE:     Follow-up For:  Shock    History of Present Illness:  Patient is a 67 y.o. female with past medical history of combined systolic and diastolic heart failure (EF of 10%), CAD, HTN, COPD and colectomy for colovesical fistula presented to [facility] ER on 5/10/23 with worsening shortness of breath. Patient intubated at time of my exam thus history obtained from chart review. Per report, patient had presented with worsening shortness of breath with associated nausea and vomiting. BNP was 4791 on presentation. In the ER she was given fluids and Lasix and admitted to ICU. She then developed atrial tachycardia and was initially on Dopamine then transitioned to Amiodarone drip. This morning patient had acute decompensation with worsening respiratory failure and was emergently intubated. Now with worsening multiorgan failure and requiring multiple pressors. No family at bedside.    Interval History:     events noted.  Now maxed on full support (vent/pressors/steroids/etc).  Plans for comfort noted.  Discussed with son at bedside and team.  Pt with eyes open.  Labs noted and remains critically acidotic.  Deemed not a candidate for CRRT.     Review of Systems:    Unable.     OBJECTIVE:     Vital Signs Range (Last 24H):  BP (!) 83/52   Pulse (!) 120   Temp 98.2 °F (36.8 °C) (Axillary)   Resp (!) 32   Ht 5' 3" (1.6 m)   Wt 54.3 kg (119 lb 11.4 oz)   LMP  (LMP Unknown)   SpO2 (!) 64%   Breastfeeding No   BMI 21.21 kg/m²     Temp:  [97.8 °F (36.6 °C)-99 °F (37.2 °C)]   Pulse:  []   Resp:  [14-58]   BP: ()/()   SpO2:  [29 %-100 %]   Arterial Line BP: ()/(45-89)     I & O (Last 24H):  Intake/Output Summary (Last 24 hours) at 5/12/2023 0823  Last data filed at 5/12/2023 0616  Gross per 24 hour   Intake 9511.08 ml   Output 145 ml   Net 9366.08 ml       Physical Exam:  General appearance: intubated, critically " ill  Eyes:  Conjunctivae/corneas clear. PERRL.  Lungs: b/l rales.  Ecchymotic chest  Heart: Regular rate and rhythm, S1, S2 normal, +systolic murmur  Abdomen: Soft, +colostomy  Extremities: +cyanotic LE's, no edema  Neurologic: unable to assess    Laboratory Data:  Recent Labs   Lab 05/11/23  1627   WBC 3.43*   RBC 4.74   HGB 11.3*   HCT 39.7      MCV 84   MCH 23.8*   MCHC 28.5*       BMP:   Recent Labs   Lab 05/12/23  0518   *   *   K 5.7*  5.7*   CL 83*   CO2 6*   BUN 28*   CREATININE 2.7*   CALCIUM 6.0*   MG 1.9     Lab Results   Component Value Date    CALCIUM 6.0 (LL) 05/12/2023    PHOS 9.0 (HH) 05/11/2023       Lab Results   Component Value Date    CALCIUM 6.0 (LL) 05/12/2023    CAION 1.09 03/01/2023    PHOS 9.0 (HH) 05/11/2023       Lab Results   Component Value Date    URICACID 4.9 02/04/2023       BNP  Recent Labs   Lab 05/10/23  1407   BNP 4,791*       Medications:  Medication list was reviewed and changes noted under Assessment/Plan.    Diagnostic Results:    X-Ray Chest 1 View   Final Result      As above         Electronically signed by: Bret Kamara MD   Date:    05/11/2023   Time:    12:40      CT Abdomen Pelvis  Without Contrast   Final Result      Hepatic steatosis.  Hepatomegaly.      Mild right subhepatic fluid.  Mild anasarca.  Mesenteric haziness or edema.      Small persistent fluid and air along the midline abdominal incision.      Enlarged left periaortic lymph node.      Left lower quadrant colostomy.  No small bowel obstruction.      Cardiomegaly.      Thickening of the interlobular septa.  Findings may be related interstitial disease and or pulmonary edema.         Electronically signed by: Beba Ramos   Date:    05/10/2023   Time:    19:18      US Abdomen Limited (Gallbladder)   Final Result      No acute process seen.  Cholecystectomy.         Electronically signed by: Layla Verdugo   Date:    05/10/2023   Time:    16:02      X-Ray Chest 1 View   Final Result       As above.         Electronically signed by: Keanu Watts MD   Date:    05/10/2023   Time:    14:57          ASSESSMENT/PLAN:            Acute kidney injury  Cardiorenal syndrome  Anion gap metabolic acidosis  M[facility]  -Cr elevated from admission; 1.2 on presentation and was given fluids and Lasix in ER  -This morning elevated to 1.3 then 1.8  -Likely due to worsening cardiogenic shock -- patient now on dobutamine drip  -Bicarb of 7; have added bicarb drip  -She has been shifted for hyperkalemia and repeat labs are pending  -May need CRRT if no improvement with dobutamine and bicarb drip  -5/12:  events noted.  Now maxed on pressors/vent/steroids.  NOT A Candidate for CRRT.  Discussed with son at bedside and transition to comfort.   -Renally dose meds, avoid nephrotoxins, and monitor I/O's closely.       Hyperkalemia  -Shifted by critical care team  -Repeat labs pending  -Correcting acidosis with bicarb drip  -5/12:  could push more bicarb if needed or lokelma OGT.       Acute on chronic systolic and diastolic heart failure exacerbation  Cardiogenic shock  -Cardio following closely  -Patient now on dobutamine drip  -Currently on IV Lasix 40 mg BID     Multifocal atrial tachycardia  -Patient on amiodarone drip initially now held     Shock liver  -Trend transaminases     CAD  -h/o PCI in 2022  -Dr. Cordova following     H/o colectomy with colostomy placement  -Colorectal surgery following; no acute intervention       Dispo:    Now w/ MOSF and actively dying.  Transitioning to comfort which is appropriate.   Nothing much else to offer.  Will sign off  Call with questions/concerns.         Total critical care time (exclusive of procedural time) : 35 minutes  Critical care was necessary to treat or prevent imminent or life-threatening deterioration of the following conditions: cardiogenic shock, acute renal failure.     Critical care was time spent personally by me on the following activities: development of  treatment plan with patient or surrogate, discussions with consultants, interpretation of cardiac output measurements, examination of patient, ordering and performing treatments and interventions, evaluation of patient's response to treatment, obtaining history from patient or surrogate, ordering and review of laboratory studies, ordering and review of radiographic studies, re-evaluation of patient's condition, pulse oximetry and blood draw for specimens

## 2023-05-12 NOTE — ASSESSMENT & PLAN NOTE
Severe metabolic acidosis due to shock  Continue management of shock  Continue sodium bicarbonate drip

## 2023-05-12 NOTE — SUBJECTIVE & OBJECTIVE
Interval History:   Patient remains critically ill.   Patient is max on 3 pressors including dobutamine and remains in profound shock with multi-organ failure.  Patient is oliguria.       Objective:     Vital Signs (Most Recent):  Temp: 98.2 °F (36.8 °C) (05/12/23 0330)  Pulse: (!) 120 (05/12/23 0743)  Resp: (!) 32 (05/12/23 0743)  BP: (!) 83/52 (05/12/23 0600)  SpO2: (!) 64 % (05/12/23 0743) Vital Signs (24h Range):  Temp:  [98.2 °F (36.8 °C)-99 °F (37.2 °C)] 98.2 °F (36.8 °C)  Pulse:  [] 120  Resp:  [14-58] 32  SpO2:  [29 %-99 %] 64 %  BP: ()/() 83/52  Arterial Line BP: ()/(45-89) 79/64     Weight: 54.3 kg (119 lb 11.4 oz)  Body mass index is 21.21 kg/m².      Intake/Output Summary (Last 24 hours) at 5/12/2023 0913  Last data filed at 5/12/2023 0616  Gross per 24 hour   Intake 9511.08 ml   Output 145 ml   Net 9366.08 ml        Physical Exam  Vitals reviewed.    Constitutional:       Appearance: She is ill-appearing and toxic-appearing.   HENT:      Head: Normocephalic and atraumatic.   Eyes:      Conjunctiva/sclera: Conjunctivae normal.   Cardiovascular:      Rate and Rhythm: Tachycardia and regular rhythm.   Pulmonary:      Bilateral decrease breath sounds  Abdominal:      Abdomen is distended with subcutaneous edema      Tenderness: There is abdominal tenderness.      Comments: Mid-line wounds noted with purulent drainage    Musculoskeletal:      Right lower leg: Edema present     Left lower leg: Edema present  Neurological:      Intubated on vent   Skin:     General: Skin is warm and dry.      Comments: Mottled    Review of Systems: Unable to assess, patient is intubated on vent    Vents:  Vent Mode: A/C (05/12/23 0743)  Set Rate: 32 BPM (05/12/23 0743)  Vt Set: 410 mL (05/12/23 0743)  PEEP/CPAP: 5 cmH20 (05/12/23 0743)  Oxygen Concentration (%): 30 (05/12/23 0743)  Peak Airway Pressure: 27 cmH20 (05/12/23 0743)  Plateau Pressure: 0 cmH20 (05/12/23 0743)  Total Ve: 13.5 L/m (05/12/23  0743)  Negative Inspiratory Force (cm H2O): 0 (05/12/23 0743)  F/VT Ratio<105 (RSBI): (!) 76.37 (05/12/23 0743)    Lines/Drains/Airways       Central Venous Catheter Line  Duration             Percutaneous Central Line Insertion/Assessment - Triple Lumen  05/11/23 1130 Internal Jugular Right <1 day              Drain  Duration                  Ureteral Drain/Stent 02/06/23 0813 Left ureter 5 Fr. 95 days         Ileostomy 02/06/23 1118 Standard ([place], end) LUQ 94 days         NG/OG Tube 05/11/23 1108 Bodfish sump 16 Fr. Right mouth <1 day         Urethral Catheter 05/11/23 1230 Silicone 16 Fr. <1 day              Airway  Duration                  Airway - Non-Surgical 05/11/23 1104 Endotracheal Tube <1 day              Arterial Line  Duration             Arterial Line 05/11/23 1017 Left Radial <1 day              Peripheral Intravenous Line  Duration                  Peripheral IV - Single Lumen 05/10/23 1336 20 G Right Forearm 1 day                    Significant Labs:    CBC/Anemia Profile:  Recent Labs   Lab 05/10/23  1407 05/11/23  0303 05/11/23  1209 05/11/23  1627   WBC 3.71* 3.31*  --  3.43*   HGB 11.2* 11.0*  --  11.3*   HCT 35.9* 35.4* 37 39.7    337  --  178   MCV 76* 75*  --  84   RDW 21.9* 21.9*  --  21.7*        Chemistries:  Recent Labs   Lab 05/10/23  1407 05/10/23  2152 05/11/23  1054 05/11/23  1627 05/11/23  2128 05/12/23  0043 05/12/23  0518   *   < > 124* 123* 121* 121* 122*   K 4.1   < > 6.1* 5.2* 6.6* 5.7*  5.7* 5.7*  5.7*   CL 92*   < > 90* 86* 86* 83* 83*   CO2 24   < > 7* 6* <5* 6* 6*   BUN 28*   < > 31* 31* 30* 29* 28*   CREATININE 1.2   < > 1.8* 2.0* 2.4* 2.5* 2.7*   CALCIUM 8.9   < > 8.3* 7.6* 6.8* 6.5* 6.0*   ALBUMIN 2.3*  --  1.9* 1.7*  --   --  1.2*   PROT 8.4  --  7.5 6.7  --   --  4.9*   BILITOT 1.2*  --  2.1* 2.7*  --   --  2.7*   ALKPHOS 146*  --  161* 215*  --   --  248*   *  --  368* 1,973*  --   --  4,763*   *  --  734* 4,934*  --   --  17,659*   MG  2.0   < > 2.7*  --  2.2  --  1.9   PHOS 3.4  --  9.0*  --   --   --   --     < > = values in this interval not displayed.       ABGs:   Recent Labs   Lab 05/11/23  1209   PH 7.007*   PCO2 23.6*   HCO3 5.9*   POCSATURATED 100   BE -25     Blood Culture:   Recent Labs   Lab 05/11/23  1005 05/11/23  1020   LABBLOO No Growth to date No Growth to date     CMP:   Recent Labs   Lab 05/11/23  1054 05/11/23  1627 05/11/23  2128 05/12/23  0043 05/12/23  0518   * 123* 121* 121* 122*   K 6.1* 5.2* 6.6* 5.7*  5.7* 5.7*  5.7*   CL 90* 86* 86* 83* 83*   CO2 7* 6* <5* 6* 6*   * 206* 215* 367* 321*   BUN 31* 31* 30* 29* 28*   CREATININE 1.8* 2.0* 2.4* 2.5* 2.7*   CALCIUM 8.3* 7.6* 6.8* 6.5* 6.0*   PROT 7.5 6.7  --   --  4.9*   ALBUMIN 1.9* 1.7*  --   --  1.2*   BILITOT 2.1* 2.7*  --   --  2.7*   ALKPHOS 161* 215*  --   --  248*   * 4,934*  --   --  17,659*   * 1,973*  --   --  4,763*   ANIONGAP 27* 31* Unable to calculate 32* 33*     Lactic Acid:   Recent Labs   Lab 05/10/23  2152 05/11/23  1005 05/11/23  1627   LACTATE 1.8 SEE COMMENT >12.0*     Troponin:   Recent Labs   Lab 05/10/23  2152 05/11/23  0303   TROPONINI 0.062* 0.054*       Significant Imaging:  I have reviewed all pertinent imaging results/findings within the past 24 hours.

## 2023-05-12 NOTE — ASSESSMENT & PLAN NOTE
Multifocal atrial tachycardia, CHF exacerbation, debility, encephalopathy, acidosis, resp failure, multiorgan failure  - Shock likely cardiogenic vs septic; CT Abd/Pelvis without clear evidence of infection. Appreciate surgery assistance. Given worsening start empiric antibiotics with cefepime, vancomycin.  - Ideally continue diuresis; ordered as furosemide 40mg IV BID, strict intake/output, daily weights.  - Mentation worsening and decreasing O2 sats/pressures. Started dobutamine gtt, norepinephrine gtt, vasopressin gtt.  - Intubated this AM for airway protection. Case discussed with cardiology, nephrology, pulm/crit, palliative and patient's son via phone.  - Repeat labs showing worsening organ function. Discussed goals with son in afternoon; in the event of cardiac arrest would not wish to prolong her suffering in the setting of multiple organ dysfunction. Wishes to continue ventilatory and pressor support which seems reasonable. Partial code status ordered.  - Appreciate consultant assistance. Prognosis is grim with her baseline severe heart disease.

## 2023-05-12 NOTE — ASSESSMENT & PLAN NOTE
Likely combination of distributive from sepsis (suspected from abdominal wound) and cardiogenic shock  Blood culture, urine culture, wound culture  Continue vasopressors along with dobutamine  Continue broad spectrum antibiotics  Patient is on multiple pressors, will add stress dose steroid  Monitor blood pressure  Surgical consult to assess the wound  Follow up cardiology recommendations

## 2023-05-16 LAB
BACTERIA BLD CULT: NORMAL
BACTERIA BLD CULT: NORMAL

## 2023-11-01 NOTE — PROGRESS NOTES
[facility]  Colorectal Surgery  Progress Note    Patient Name: Odette Hart  MRN: [id number]  Admission Date: 4/20/2022  Hospital Length of Stay: 3 days  Attending Physician: Joseph Potter MD    Subjective:     Interval History: No acute events overnight. Hemodynamically stable. Says she is feeling better this morning and her work of breathing has improved. She is eating small amounts of food but she is not taking boosts because she doesn't like them warm.     Post-Op Info:  * No surgery found *          Medications:  Continuous Infusions:   dextrose 10 % in water (D10W)      TPN ADULT CENTRAL LINE CUSTOM 38 mL/hr at 04/22/22 2141     Scheduled Meds:   aspirin  81 mg Oral Daily    carvediloL  12.5 mg Oral BID WM    enoxaparin  40 mg Subcutaneous Daily    fat emulsion 20%  250 mL Intravenous Daily    furosemide (LASIX) injection  40 mg Intravenous Q12H    pantoprazole  40 mg Oral Daily    piperacillin-tazobactam (ZOSYN) IVPB  4.5 g Intravenous Q8H    prasugreL  10 mg Oral Daily    sodium chloride 0.9%  10 mL Intravenous Q6H     PRN Meds:   acetaminophen    albuterol    ALPRAZolam    dextrose 10 % in water (D10W)    dextrose 10%    dicyclomine    glucagon (human recombinant)    insulin aspart U-100    naloxone    ondansetron    prochlorperazine    sodium chloride 0.9%    sodium chloride 0.9%        Objective:     Vital Signs (Most Recent):  Temp: 98.2 °F (36.8 °C) (04/23/22 0459)  Pulse: 65 (04/23/22 0714)  Resp: 20 (04/23/22 0459)  BP: (!) 90/55 (04/23/22 0459)  SpO2: (!) 94 % (04/23/22 0459)   Vital Signs (24h Range):  Temp:  [97.6 °F (36.4 °C)-98.5 °F (36.9 °C)] 98.2 °F (36.8 °C)  Pulse:  [] 65  Resp:  [18-20] 20  SpO2:  [92 %-96 %] 94 %  BP: ()/(55-72) 90/55     Intake/Output - Last 3 Shifts         04/21 0700  04/22 0659 04/22 0700 04/23 0659 04/23 0700 04/24 0659    IV Piggyback       Total Intake(mL/kg)       Net                      Physical Exam  Vitals  and nursing note reviewed.   Constitutional:       General: She is not in acute distress.     Appearance: She is well-developed.   HENT:      Head: Normocephalic.   Eyes:      Pupils: Pupils are equal, round, and reactive to light.   Cardiovascular:      Rate and Rhythm: Normal rate and regular rhythm.      Heart sounds: Normal heart sounds.   Pulmonary:      Effort: Pulmonary effort is normal. No respiratory distress.      Breath sounds: Normal breath sounds. No wheezing or rales.   Abdominal:      Palpations: Abdomen is soft. There is no mass.      Tenderness: There is no guarding or rebound.   Skin:     General: Skin is warm and dry.   Neurological:      Mental Status: She is alert and oriented to person, place, and time.   Psychiatric:         Behavior: Behavior normal.         Thought Content: Thought content normal.         Judgment: Judgment normal.       Significant Labs:  BMP (Last 3 Results):   Recent Labs   Lab 04/21/22  0629 04/22/22  1026 04/23/22  0445   GLU 85 168* 113*    132* 137   K 3.9 3.9 3.3*    101 98   CO2 25 25 31*   BUN 7* 10 15   CREATININE 0.7 0.8 0.6   CALCIUM 8.3* 7.8* 7.6*   MG  --  2.0  --      CBC (Last 3 Results):   Recent Labs   Lab 04/21/22  0629 04/22/22  1025 04/23/22  0445   WBC 3.08* 3.17* 2.98*   RBC 4.01 4.17 4.00   HGB 10.6* 11.2* 11.0*   HCT 33.6* 34.5* 33.0*    239 209   MCV 84 83 83   MCH 26.4* 26.9* 27.5   MCHC 31.5* 32.5 33.3       Significant Diagnostics:  I have reviewed all pertinent imaging results/findings within the past 24 hours.    Assessment/Plan:     * Colovesical fistula  Admitted from home with known colovesical fistula    -cont IV Zosyn  -monitor I&O  -monitor vs  -surgical planning  - Renew TPN  - Continue regular diet, encouraged patient to try boost over ice    Severe malnutrition  Nutrition consulted. Most recent weight and BMI monitored-     Malnutrition Type and Energy Intake  Malnutrition Type: chronic illness    Malnutrition  (Moderate to Severe)  Weight Loss (Malnutrition):  (14% x 3 months per chart review)  Energy Intake (Malnutrition): less than 75% for greater than or equal to 1 month (susected <75% x 3 months given wt loss)       PICC-TPN        Measurements:  Wt Readings from Last 1 Encounters:   04/20/22 53.1 kg (117 lb 1 oz)   Body mass index is 20.74 kg/m².    Recommendations: Recommendation/Intervention: 1.  Goals: receive nutrition by RD follow-up    Patient has been screened and assessed by RD. RD will follow patient.    TPN/lipids concentrated and running at low rate given malnutrition/CHF      Chronic combined systolic and diastolic congestive heart failure  Patient is identified as having Combined Systolic and Diastolic heart failure that is Chronic. CHF is currently controlled. Latest ECHO performed and demonstrates- Results for orders placed during the hospital encounter of 01/22/22    Echo Saline Bubble? No    Interpretation Summary  · The left ventricle is severely enlarged with mild eccentric hypertrophy and severely decreased systolic function.  · There are segmental left ventricular wall motion abnormalities. The mid anteroseptal and anterior walls are akinetic. The apex is akinetic. The remainder of the left ventricular walls are severely hypokinetic.  · The estimated ejection fraction is 20%.  · Spontaneous echocardiographic contrast in the left ventricle.  · Grade III left ventricular diastolic dysfunction.  · Severe left atrial enlargement.  · Normal right ventricular size with normal right ventricular systolic function.  · Mild right atrial enlargement.  · Mild aortic regurgitation.  · Mild-to-moderate mitral regurgitation.  · Mild tricuspid regurgitation.  · The estimated PA systolic pressure is 28 mmHg.  · Intermediate central venous pressure (8 mmHg).  · Pacemaker/ICD lead.  Cont home meds  Monitor weight and vs  ..      Primary hypertension  Chronic, controlled.  Latest blood pressure and vitals reviewed-    Temp:  [96.3 °F (35.7 °C)-97.9 °F (36.6 °C)]   Pulse:  [65-82]   Resp:  [16-22]   BP: ()/(52-55)   SpO2:  [92 %-100 %] .   Home meds for hypertension were reviewed and noted below.   Hypertension Medications             carvediloL (COREG) 12.5 MG tablet Take 1 tablet (12.5 mg total) by mouth 2 (two) times daily with meals.    nitroGLYCERIN (NITROSTAT) 0.4 MG SL tablet Place 1 tablet (0.4 mg total) under the tongue every 5 (five) minutes as needed for Chest pain.    spironolactone (ALDACTONE) 25 MG tablet Take 1 tablet (25 mg total) by mouth once daily.          While in the hospital, will manage blood pressure as follows; Continue home antihypertensive regimen    Will utilize p.r.n. blood pressure medication only if patient's blood pressure greater than  180/110 and she develops symptoms such as worsening chest pain or shortness of breath.      Coronary artery disease of native artery of native heart with stable angina pectoris  Tele  Cont home meds  Monitor vs    Acute diverticulitis  CT imaging reviewed with IR, no benefit from aspiration/drainage at this time    Automatic implantable cardioverter-defibrillator in situ  Tele  Cont home meds  Monitor vs          Charlene Fairchild MD  Colorectal Surgery  [facility]       Never

## 2024-05-23 NOTE — PROGRESS NOTES
Patient Contact     S/w pt and relayed message from Dr. Mclean, see below. She stated understanding and will schedule the X-ray but would like a location closer to her. Sent to the top of the line to assist with scheduling the imaging at a location convenient for her.     Noemy MARTIN RN  [facility]    Subjective:       Patient ID: Odette Hart is a 62 y.o. female.    Chief Complaint: Pelvic Pain    HPI   Pt c/o pelvic LLQ pain had it before from diverticulitis feel similar pain last 2 days no N/V/D no fever chill no sob cp and a lot of stress shaking anxiety CHF with defibrilator ha snot had Echo or see cardiologist recently  Review of Systems    Objective:      Physical Exam   Constitutional: She is oriented to person, place, and time. She appears well-developed and well-nourished. No distress.   HENT:   Head: Normocephalic and atraumatic.   Right Ear: External ear normal.   Left Ear: External ear normal.   Nose: Nose normal.   Mouth/Throat: Oropharynx is clear and moist. No oropharyngeal exudate.   Eyes: Conjunctivae and EOM are normal. Pupils are equal, round, and reactive to light. Right eye exhibits no discharge. Left eye exhibits no discharge.   Neck: Normal range of motion. Neck supple. No thyromegaly present.   Cardiovascular: Normal rate, regular rhythm, normal heart sounds and intact distal pulses. Exam reveals no gallop and no friction rub.   No murmur heard.  Pulmonary/Chest: Effort normal and breath sounds normal. No respiratory distress. She has no wheezes. She has no rales. She exhibits no tenderness.   Abdominal: Soft. Bowel sounds are normal. She exhibits no distension. There is tenderness (tendernessLLQ with plapation no guarding or rebounced). There is no rebound and no guarding.   Musculoskeletal: Normal range of motion. She exhibits no edema, tenderness or deformity.   Lymphadenopathy:     She has no cervical adenopathy.   Neurological: She is alert and oriented to person, place, and time.   Skin: Skin is warm and dry. Capillary refill takes less than 2 seconds. No rash noted. No erythema.   Psychiatric: She has a normal mood and affect. Judgment and thought content normal.   Nursing note and vitals reviewed.      Assessment:       1. Diverticulitis    2. Anxiety    3. Congestive heart  failure    4. Bronchitis    5. Abdominal pain, LLQ (left lower quadrant)        Plan:       Diverticulitis  -     ondansetron (ZOFRAN-ODT) 8 MG TbDL; Take 1 tablet (8 mg total) by mouth every 12 (twelve) hours as needed.  Dispense: 10 tablet; Refill: 0  -     CBC auto differential; Future; Expected date: 09/07/2018  -     Comprehensive metabolic panel; Future; Expected date: 09/07/2018  -     Sedimentation rate; Future; Expected date: 09/07/2018  -     CT Abdomen Pelvis  Without Contrast; Future; Expected date: 09/07/2018  -     ciprofloxacin HCl (CIPRO) 500 MG tablet; Take 1 tablet (500 mg total) by mouth 2 (two) times daily. for 10 days  Dispense: 20 tablet; Refill: 1  -     metroNIDAZOLE (FLAGYL) 500 MG tablet; Take 1 tablet (500 mg total) by mouth every 8 (eight) hours.  Dispense: 30 tablet; Refill: 1  -     lincomycin injection 600 mg; Inject 2 mLs (600 mg total) into the muscle once.    Anxiety  -     ALPRAZolam (XANAX) 0.5 MG tablet; Take 1 tablet (0.5 mg total) by mouth nightly as needed.  Dispense: 30 tablet; Refill: 3  -     citalopram (CELEXA) 20 MG tablet; Take 1 tablet (20 mg total) by mouth once daily.  Dispense: 90 tablet; Refill: 3    Congestive heart failure  -     carvedilol (COREG) 12.5 MG tablet; Take 1 tablet (12.5 mg total) by mouth 2 (two) times daily.  Dispense: 180 tablet; Refill: 3    Bronchitis  -     albuterol 90 mcg/actuation inhaler; Inhale 2 puffs into the lungs every 6 (six) hours as needed for Wheezing. Rescue  Dispense: 18 g; Refill: 1    Abdominal pain, LLQ (left lower quadrant)

## 2024-10-30 NOTE — ASSESSMENT & PLAN NOTE
appr sx recs  Wound care  Had leaking ostomy at home, doing good here  Recent ct abdomen 6/6 showed post operative changes without a large focal fluid collection.     pleural effusion

## 2024-11-19 NOTE — PLAN OF CARE
Problem: Adult Inpatient Plan of Care  Goal: Plan of Care Review  Outcome: Ongoing, Progressing  Goal: Patient-Specific Goal (Individualized)  Outcome: Ongoing, Progressing  Goal: Absence of Hospital-Acquired Illness or Injury  Outcome: Ongoing, Progressing  Goal: Optimal Comfort and Wellbeing  Outcome: Ongoing, Progressing  Goal: Readiness for Transition of Care  Outcome: Ongoing, Progressing     Problem: Fall Injury Risk  Goal: Absence of Fall and Fall-Related Injury  Outcome: Ongoing, Progressing     Problem: Impaired Wound Healing  Goal: Optimal Wound Healing  Outcome: Ongoing, Progressing     Problem: Infection  Goal: Absence of Infection Signs and Symptoms  Outcome: Ongoing, Progressing     Problem: Skin Injury Risk Increased  Goal: Skin Health and Integrity  Outcome: Ongoing, Progressing      on the discharge service for the patient. I have reviewed and made amendments to the documentation where necessary.

## 2025-07-11 NOTE — ASSESSMENT & PLAN NOTE
Patient has Post-operative ileus which is adynamic in etiology which is improving. This is inherent to her procedure/medications. Will treat conservatively with bowel rest, IV fluids, serial abdominal exams and avoidance of GI paralytics such as narcotics or anti-spasmodics. Monitor patient closely.     Diet advanced 2/12; resolved   (0) No aphasia; normal

## (undated) DEVICE — SUT CTD VICRYL VIL BR CR/SH

## (undated) DEVICE — SEE L#95700

## (undated) DEVICE — DRAPE CORETEMP FLD WRM 56X62IN

## (undated) DEVICE — DRAPE LEGGINGS CUFF 33X51IN

## (undated) DEVICE — SEE MEDLINE ITEM 156902

## (undated) DEVICE — SOL IRR WATER STRL 3000 ML

## (undated) DEVICE — GUIDE RUNWAY 6FR CLS 3.5

## (undated) DEVICE — TUBING SUCTION STERILE

## (undated) DEVICE — SEALER LIGASURE IMPACT 18CM

## (undated) DEVICE — SEE MEDLINE ITEM 154981

## (undated) DEVICE — STAPLER CONTOUR CRV GRN 40MM

## (undated) DEVICE — Device

## (undated) DEVICE — TIP YANKAUERS BULB NO VENT

## (undated) DEVICE — DRAPE INCISE IOBAN 2 23X17IN

## (undated) DEVICE — LUBRICANT SURGILUBE 2 OZ

## (undated) DEVICE — SOL NS 1000CC

## (undated) DEVICE — STAPLER DST GREEN 45X4.8MM

## (undated) DEVICE — COVER LIGHT HANDLE 80/CA

## (undated) DEVICE — CONNECTOR Y 3/8X3/8X3/8

## (undated) DEVICE — WIRE GUIDE 0.038OLD

## (undated) DEVICE — SUT MONOCRYL 4-0 PS-1 UND

## (undated) DEVICE — DRESSING TRANS 4X4 TEGADERM

## (undated) DEVICE — KIT GLIDESHEATH SLEND 6FR 10CM

## (undated) DEVICE — BELLOW CANN HEMOBLAST 1.65GR

## (undated) DEVICE — GUIDEWIRE ANGIO 1.5MM .035X180

## (undated) DEVICE — CATH BLLN FG APEX MR 2.50X20MM

## (undated) DEVICE — TRAY CORONARY CUSTOM BAPTIST

## (undated) DEVICE — BOWL STERILE LARGE 32OZ

## (undated) DEVICE — POUCH SENSURA MIO 3/8X2 1/8IN

## (undated) DEVICE — CATH POLLACK OPEN-END FLEXI-TI

## (undated) DEVICE — TRAP SUCTION POLYP

## (undated) DEVICE — DRAPE ABDOMINAL TIBURON 14X11

## (undated) DEVICE — SYR 30CC LUER LOCK

## (undated) DEVICE — SUT CTD VICRYL 2-0 VIL BR

## (undated) DEVICE — DRESSING ADH ISLAND 3.6 X 14

## (undated) DEVICE — BAG DRAINAGE W/SPIKE

## (undated) DEVICE — DRAPE UINDERBUT GRAD PCH

## (undated) DEVICE — SEE MEDLINE ITEM 157181

## (undated) DEVICE — SYR 10ML LIDOCAINE

## (undated) DEVICE — SEE MEDLINE ITEM 157144

## (undated) DEVICE — GAUZE SPONGE PEANUT STRL

## (undated) DEVICE — SYR ONLY LUER LOCK 20CC

## (undated) DEVICE — TRAY CATH FOL SIL URIMTR 16FR

## (undated) DEVICE — HEMOSTAT VASC BAND REG 24CM

## (undated) DEVICE — SEE MEDLINE ITEM 157187

## (undated) DEVICE — STAPLER DST BLUE 45X3.5MM

## (undated) DEVICE — COVER MAYO STND XL 30X57IN

## (undated) DEVICE — SET IRR URLGY 2LINE UNIV SPIKE

## (undated) DEVICE — BOVIE SUCTION

## (undated) DEVICE — SPONGE DERMACEA 4X4IN 12PLY

## (undated) DEVICE — SALINE .45% 1000ML VITEK2

## (undated) DEVICE — TOWEL OR DISP STRL BLUE 4/PK

## (undated) DEVICE — BAG SNAP KOVER BAND 36 X 28 IN

## (undated) DEVICE — MANIFOLD PERCEPTOR MP 3P RH ON

## (undated) DEVICE — SUT 3-0 VICRYL SH CR/8 18

## (undated) DEVICE — CATH IV INTROCAN 14G X 2.

## (undated) DEVICE — GLIDESHEATH SLENDER SS 5FR10CM

## (undated) DEVICE — OMNIPAQUE 350MG 150ML VIAL

## (undated) DEVICE — KIT PROBE COVER WITH GEL

## (undated) DEVICE — KIT CUSTOM INFLATION DEV

## (undated) DEVICE — GOWN SURGICAL X-LARGE

## (undated) DEVICE — LEGGINGS 48X31 INCH

## (undated) DEVICE — RELOAD PROXIMATE CUT BLUE 75MM

## (undated) DEVICE — SUT 2-0 12-18IN SILK

## (undated) DEVICE — CATH OPTITORQUE RADIAL 5FR

## (undated) DEVICE — WIRE GUIDE HI TRQ BAL

## (undated) DEVICE — ELECTRODE REM PLYHSV RETURN 9

## (undated) DEVICE — NDL BOX COUNTER

## (undated) DEVICE — GUIDE WIRE MOTION .035 X 150CM

## (undated) DEVICE — COVER SANP CLR 36X54

## (undated) DEVICE — SOL IRR NACL .9% 3000ML

## (undated) DEVICE — SEE MEDLINE ITEM 157117

## (undated) DEVICE — CUTTER PROXIMATE BLUE 75MM

## (undated) DEVICE — ADHESIVE DERMABOND ADVANCED

## (undated) DEVICE — STAPLER CONTOUR 40MM GREEN

## (undated) DEVICE — PACK PERI/GYN OPTIMA

## (undated) DEVICE — CUP MEDICINE GRADUATED 1OZ

## (undated) DEVICE — SYR 10CC LUER LOCK

## (undated) DEVICE — TRAY FOLEY 16FR INFECTION CONT